# Patient Record
Sex: FEMALE | Race: BLACK OR AFRICAN AMERICAN | NOT HISPANIC OR LATINO | URBAN - METROPOLITAN AREA
[De-identification: names, ages, dates, MRNs, and addresses within clinical notes are randomized per-mention and may not be internally consistent; named-entity substitution may affect disease eponyms.]

---

## 2019-06-18 ENCOUNTER — EMERGENCY (EMERGENCY)
Facility: HOSPITAL | Age: 34
LOS: 1 days | Discharge: HOME | End: 2019-06-18
Attending: EMERGENCY MEDICINE | Admitting: EMERGENCY MEDICINE
Payer: MEDICARE

## 2019-06-18 VITALS
HEIGHT: 68 IN | HEART RATE: 84 BPM | WEIGHT: 171.74 LBS | DIASTOLIC BLOOD PRESSURE: 79 MMHG | SYSTOLIC BLOOD PRESSURE: 118 MMHG | OXYGEN SATURATION: 97 % | TEMPERATURE: 98 F | RESPIRATION RATE: 18 BRPM

## 2019-06-18 VITALS
HEART RATE: 72 BPM | DIASTOLIC BLOOD PRESSURE: 80 MMHG | SYSTOLIC BLOOD PRESSURE: 112 MMHG | RESPIRATION RATE: 16 BRPM | OXYGEN SATURATION: 97 % | TEMPERATURE: 98 F

## 2019-06-18 PROCEDURE — 99284 EMERGENCY DEPT VISIT MOD MDM: CPT

## 2019-06-18 PROCEDURE — 99284 EMERGENCY DEPT VISIT MOD MDM: CPT | Mod: 25

## 2019-06-18 PROCEDURE — 96375 TX/PRO/DX INJ NEW DRUG ADDON: CPT

## 2019-06-18 PROCEDURE — 96374 THER/PROPH/DIAG INJ IV PUSH: CPT

## 2019-06-18 RX ORDER — SODIUM CHLORIDE 9 MG/ML
1000 INJECTION INTRAMUSCULAR; INTRAVENOUS; SUBCUTANEOUS ONCE
Refills: 0 | Status: COMPLETED | OUTPATIENT
Start: 2019-06-18 | End: 2019-06-18

## 2019-06-18 RX ORDER — ACETAMINOPHEN 500 MG
1000 TABLET ORAL ONCE
Refills: 0 | Status: COMPLETED | OUTPATIENT
Start: 2019-06-18 | End: 2019-06-18

## 2019-06-18 RX ORDER — METOCLOPRAMIDE HCL 10 MG
10 TABLET ORAL ONCE
Refills: 0 | Status: COMPLETED | OUTPATIENT
Start: 2019-06-18 | End: 2019-06-18

## 2019-06-18 RX ADMIN — Medication 10 MILLIGRAM(S): at 04:40

## 2019-06-18 RX ADMIN — SODIUM CHLORIDE 1000 MILLILITER(S): 9 INJECTION INTRAMUSCULAR; INTRAVENOUS; SUBCUTANEOUS at 04:40

## 2019-06-18 RX ADMIN — Medication 400 MILLIGRAM(S): at 05:23

## 2019-06-18 NOTE — ED PROVIDER NOTE - NSFOLLOWUPINSTRUCTIONS_ED_ALL_ED_FT
Migraine Headache  Image   A migraine headache is an intense, throbbing pain on one side or both sides of the head. Migraines may also cause other symptoms, such as nausea, vomiting, and sensitivity to light and noise.    What are the causes?  Doing or taking certain things may also trigger migraines, such as:  Alcohol.  Smoking.  Medicines, such as:  Medicine used to treat chest pain (nitroglycerine).  Birth control pills.  Estrogen pills.  Certain blood pressure medicines.  Aged cheeses, chocolate, or caffeine.  Foods or drinks that contain nitrates, glutamate, aspartame, or tyramine.  Physical activity.  Other things that may trigger a migraine include:  Menstruation.  Pregnancy.  Hunger.  Stress, lack of sleep, too much sleep, or fatigue.  Weather changes.  What increases the risk?  The following factors may make you more likely to experience migraine headaches:  Age. Risk increases with age.  Family history of migraine headaches.  Being .  Depression and anxiety.  Obesity.  Being a woman.  Having a hole in the heart (patent foramen ovale) or other heart problems.  What are the signs or symptoms?  The main symptom of this condition is pulsating or throbbing pain. Pain may:  Happen in any area of the head, such as on one side or both sides.  Interfere with daily activities.  Get worse with physical activity.  Get worse with exposure to bright lights or loud noises.  Other symptoms may include:  Nausea.  Vomiting.  Dizziness.  General sensitivity to bright lights, loud noises, or smells.  Before you get a migraine, you may get warning signs that a migraine is developing (aura). An aura may include:  Seeing flashing lights or having blind spots.  Seeing bright spots, halos, or zigzag lines.  Having tunnel vision or blurred vision.  Having numbness or a tingling feeling.  Having trouble talking.  Having muscle weakness.  How is this diagnosed?  A migraine headache can be diagnosed based on:  Your symptoms.  A physical exam.  Tests, such as CT scan or MRI of the head. These imaging tests can help rule out other causes of headaches.  Taking fluid from the spine (lumbar puncture) and analyzing it (cerebrospinal fluid analysis, or CSF analysis).  How is this treated?  A migraine headache is usually treated with medicines that:  Relieve pain.  Relieve nausea.  Prevent migraines from coming back.  Treatment may also include:  Acupuncture.  Lifestyle changes like avoiding foods that trigger migraines.  Follow these instructions at home:  Medicines     Take over-the-counter and prescription medicines only as told by your health care provider.  Do not drive or use heavy machinery while taking prescription pain medicine.  To prevent or treat constipation while you are taking prescription pain medicine, your health care provider may recommend that you:  Drink enough fluid to keep your urine clear or pale yellow.  Take over-the-counter or prescription medicines.  Eat foods that are high in fiber, such as fresh fruits and vegetables, whole grains, and beans.  Limit foods that are high in fat and processed sugars, such as fried and sweet foods.  Lifestyle     Avoid alcohol use.  Do not use any products that contain nicotine or tobacco, such as cigarettes and e-cigarettes. If you need help quitting, ask your health care provider.  Get at least 8 hours of sleep every night.  Limit your stress.  General instructions     Image   Keep a journal to find out what may trigger your migraine headaches. For example, write down:  What you eat and drink.  How much sleep you get.  Any change to your diet or medicines.  If you have a migraine:  Avoid things that make your symptoms worse, such as bright lights.  It may help to lie down in a dark, quiet room.  Do not drive or use heavy machinery.  Ask your health care provider what activities are safe for you while you are experiencing symptoms.  Keep all follow-up visits as told by your health care provider. This is important.  Contact a health care provider if:  You develop symptoms that are different or more severe than your usual migraine symptoms.  Get help right away if:  Your migraine becomes severe.  You have a fever.  You have a stiff neck.  You have vision loss.  Your muscles feel weak or like you cannot control them.  You start to lose your balance often.  You develop trouble walking.  You faint.  This information is not intended to replace advice given to you by your health care provider. Make sure you discuss any questions you have with your health care provider.

## 2019-06-18 NOTE — ED ADULT NURSE NOTE - OBJECTIVE STATEMENT
34 y/o pt AOx3 arrived to the ED c/o having a headache, pt states she was [facility] where she was treated and discharged and came here because she still had a headache.

## 2019-06-18 NOTE — ED PROVIDER NOTE - ATTENDING CONTRIBUTION TO CARE
Attending Statement: I have personally performed a face to face diagnostic evaluation on this patient. I have reviewed the ACP note and agree with the history, exam and plan of care, except as noted.     Attending Contribution to Care:  33F with hx of chronic HA who p/w headache, typical of her previous HAs. do not suspect SAH, or mass, no imaging indicated at this time. Pain treated and improved. Labs including cbc/cmp, ua upreg negative with transvaginal us with all negative acute findings. Do not suspect UTI. Continue gyn recs for dysfunctional uterine bleeding. Stable for DC with outpt follow up. Advised follow-up at [facility]'s primary clinic.

## 2019-06-18 NOTE — ED PROVIDER NOTE - CLINICAL SUMMARY MEDICAL DECISION MAKING FREE TEXT BOX
32 y/o female well-appearing in NAD. Neuro intact. chronic HA without new symptoms, do not suspect SAH, therefore CT not required. Pain treated and improved. No fever, do not suspect meningitis. Pt with cbc/cmp, ua upreg negative with transvaginal us with all negative acute findings. Do not suspect UTI. Continue gyn recs for dysfunctional uterine bleeding. Pt HDS with VS nml. Advised follow-up at [facility] - pt's primary clinic.

## 2019-06-18 NOTE — ED ADULT NURSE NOTE - NSIMPLEMENTINTERV_GEN_ALL_ED
Implemented All Universal Safety Interventions:  [place] to call system. Call bell, personal items and telephone within reach. Instruct patient to call for assistance. Room bathroom lighting operational. Non-slip footwear when patient is off stretcher. Physically safe environment: no spills, clutter or unnecessary equipment. Stretcher in lowest position, wheels locked, appropriate side rails in place.

## 2019-06-22 DIAGNOSIS — R51 HEADACHE: ICD-10-CM

## 2019-06-22 DIAGNOSIS — N93.9 ABNORMAL UTERINE AND VAGINAL BLEEDING, UNSPECIFIED: ICD-10-CM

## 2019-06-22 DIAGNOSIS — R11.0 NAUSEA: ICD-10-CM

## 2019-10-24 ENCOUNTER — OUTPATIENT (OUTPATIENT)
Dept: OUTPATIENT SERVICES | Facility: HOSPITAL | Age: 34
LOS: 1 days | Discharge: HOME | End: 2019-10-24

## 2019-10-25 ENCOUNTER — OUTPATIENT (OUTPATIENT)
Dept: OUTPATIENT SERVICES | Facility: HOSPITAL | Age: 34
LOS: 1 days | Discharge: HOME | End: 2019-10-25

## 2019-10-25 DIAGNOSIS — E28.39 OTHER PRIMARY OVARIAN FAILURE: ICD-10-CM

## 2019-11-04 DIAGNOSIS — E27.0 OTHER ADRENOCORTICAL OVERACTIVITY: ICD-10-CM

## 2020-10-24 ENCOUNTER — EMERGENCY (EMERGENCY)
Facility: HOSPITAL | Age: 35
LOS: 1 days | Discharge: HOME | End: 2020-10-24
Attending: EMERGENCY MEDICINE | Admitting: EMERGENCY MEDICINE
Payer: MEDICARE

## 2020-10-24 VITALS
RESPIRATION RATE: 18 BRPM | HEART RATE: 96 BPM | SYSTOLIC BLOOD PRESSURE: 122 MMHG | TEMPERATURE: 98 F | OXYGEN SATURATION: 100 % | DIASTOLIC BLOOD PRESSURE: 84 MMHG

## 2020-10-24 VITALS
HEIGHT: 68 IN | DIASTOLIC BLOOD PRESSURE: 83 MMHG | SYSTOLIC BLOOD PRESSURE: 119 MMHG | HEART RATE: 111 BPM | WEIGHT: 210.1 LBS | OXYGEN SATURATION: 99 % | TEMPERATURE: 98 F | RESPIRATION RATE: 24 BRPM

## 2020-10-24 DIAGNOSIS — Z88.0 ALLERGY STATUS TO PENICILLIN: ICD-10-CM

## 2020-10-24 DIAGNOSIS — J45.901 UNSPECIFIED ASTHMA WITH (ACUTE) EXACERBATION: ICD-10-CM

## 2020-10-24 DIAGNOSIS — Z88.6 ALLERGY STATUS TO ANALGESIC AGENT: ICD-10-CM

## 2020-10-24 LAB
ALBUMIN SERPL ELPH-MCNC: 3.6 G/DL — SIGNIFICANT CHANGE UP (ref 3.4–5)
ALP SERPL-CCNC: 51 U/L — SIGNIFICANT CHANGE UP (ref 40–120)
ALT FLD-CCNC: 24 U/L — SIGNIFICANT CHANGE UP (ref 12–42)
ANION GAP SERPL CALC-SCNC: 11 MMOL/L — SIGNIFICANT CHANGE UP (ref 9–16)
AST SERPL-CCNC: 24 U/L — SIGNIFICANT CHANGE UP (ref 15–37)
BASOPHILS # BLD AUTO: 0.03 K/UL — SIGNIFICANT CHANGE UP (ref 0–0.2)
BASOPHILS NFR BLD AUTO: 0.5 % — SIGNIFICANT CHANGE UP (ref 0–2)
BILIRUB SERPL-MCNC: 0.2 MG/DL — SIGNIFICANT CHANGE UP (ref 0.2–1.2)
BUN SERPL-MCNC: 13 MG/DL — SIGNIFICANT CHANGE UP (ref 7–23)
CALCIUM SERPL-MCNC: 9.3 MG/DL — SIGNIFICANT CHANGE UP (ref 8.5–10.5)
CHLORIDE SERPL-SCNC: 106 MMOL/L — SIGNIFICANT CHANGE UP (ref 96–108)
CO2 SERPL-SCNC: 23 MMOL/L — SIGNIFICANT CHANGE UP (ref 22–31)
CREAT SERPL-MCNC: 0.83 MG/DL — SIGNIFICANT CHANGE UP (ref 0.5–1.3)
EOSINOPHIL # BLD AUTO: 0.01 K/UL — SIGNIFICANT CHANGE UP (ref 0–0.5)
EOSINOPHIL NFR BLD AUTO: 0.2 % — SIGNIFICANT CHANGE UP (ref 0–6)
GLUCOSE SERPL-MCNC: 106 MG/DL — HIGH (ref 70–99)
HCG SERPL-ACNC: <1 MIU/ML — SIGNIFICANT CHANGE UP
HCT VFR BLD CALC: 29.1 % — LOW (ref 34.5–45)
HGB BLD-MCNC: 9.3 G/DL — LOW (ref 11.5–15.5)
IMM GRANULOCYTES NFR BLD AUTO: 0.7 % — SIGNIFICANT CHANGE UP (ref 0–1.5)
LYMPHOCYTES # BLD AUTO: 1.66 K/UL — SIGNIFICANT CHANGE UP (ref 1–3.3)
LYMPHOCYTES # BLD AUTO: 29.5 % — SIGNIFICANT CHANGE UP (ref 13–44)
MCHC RBC-ENTMCNC: 30.2 PG — SIGNIFICANT CHANGE UP (ref 27–34)
MCHC RBC-ENTMCNC: 32 GM/DL — SIGNIFICANT CHANGE UP (ref 32–36)
MCV RBC AUTO: 94.5 FL — SIGNIFICANT CHANGE UP (ref 80–100)
MONOCYTES # BLD AUTO: 0.77 K/UL — SIGNIFICANT CHANGE UP (ref 0–0.9)
MONOCYTES NFR BLD AUTO: 13.7 % — SIGNIFICANT CHANGE UP (ref 2–14)
NEUTROPHILS # BLD AUTO: 3.11 K/UL — SIGNIFICANT CHANGE UP (ref 1.8–7.4)
NEUTROPHILS NFR BLD AUTO: 55.4 % — SIGNIFICANT CHANGE UP (ref 43–77)
NRBC # BLD: 0 /100 WBCS — SIGNIFICANT CHANGE UP (ref 0–0)
PCO2 BLDV: 40 MMHG — LOW (ref 41–51)
PH BLDV: 7.4 — SIGNIFICANT CHANGE UP (ref 7.32–7.43)
PLATELET # BLD AUTO: 121 K/UL — LOW (ref 150–400)
PO2 BLDV: 35 MMHG — SIGNIFICANT CHANGE UP (ref 35–40)
POTASSIUM SERPL-MCNC: 3.9 MMOL/L — SIGNIFICANT CHANGE UP (ref 3.5–5.3)
POTASSIUM SERPL-SCNC: 3.9 MMOL/L — SIGNIFICANT CHANGE UP (ref 3.5–5.3)
PROT SERPL-MCNC: 8.2 G/DL — SIGNIFICANT CHANGE UP (ref 6.4–8.2)
RBC # BLD: 3.08 M/UL — LOW (ref 3.8–5.2)
RBC # FLD: 16.4 % — HIGH (ref 10.3–14.5)
SAO2 % BLDV: 63 % — SIGNIFICANT CHANGE UP
SODIUM SERPL-SCNC: 140 MMOL/L — SIGNIFICANT CHANGE UP (ref 132–145)
WBC # BLD: 5.62 K/UL — SIGNIFICANT CHANGE UP (ref 3.8–10.5)
WBC # FLD AUTO: 5.62 K/UL — SIGNIFICANT CHANGE UP (ref 3.8–10.5)

## 2020-10-24 PROCEDURE — 99291 CRITICAL CARE FIRST HOUR: CPT

## 2020-10-24 PROCEDURE — 93010 ELECTROCARDIOGRAM REPORT: CPT

## 2020-10-24 RX ORDER — ALBUTEROL 90 UG/1
1 AEROSOL, METERED ORAL ONCE
Refills: 0 | Status: COMPLETED | OUTPATIENT
Start: 2020-10-24 | End: 2020-10-24

## 2020-10-24 RX ORDER — FAMOTIDINE 10 MG/ML
20 INJECTION INTRAVENOUS ONCE
Refills: 0 | Status: COMPLETED | OUTPATIENT
Start: 2020-10-24 | End: 2020-10-24

## 2020-10-24 RX ORDER — ALBUTEROL 90 UG/1
2.5 AEROSOL, METERED ORAL
Refills: 0 | Status: COMPLETED | OUTPATIENT
Start: 2020-10-24 | End: 2020-10-24

## 2020-10-24 RX ORDER — DIPHENHYDRAMINE HCL 50 MG
50 CAPSULE ORAL ONCE
Refills: 0 | Status: COMPLETED | OUTPATIENT
Start: 2020-10-24 | End: 2020-10-24

## 2020-10-24 RX ORDER — SODIUM CHLORIDE 9 MG/ML
1000 INJECTION INTRAMUSCULAR; INTRAVENOUS; SUBCUTANEOUS ONCE
Refills: 0 | Status: COMPLETED | OUTPATIENT
Start: 2020-10-24 | End: 2020-10-24

## 2020-10-24 RX ORDER — EPINEPHRINE 11.25MG/ML
0.5 SOLUTION, NON-ORAL INHALATION ONCE
Refills: 0 | Status: COMPLETED | OUTPATIENT
Start: 2020-10-24 | End: 2020-10-24

## 2020-10-24 RX ORDER — LEVETIRACETAM 250 MG/1
1 TABLET, FILM COATED ORAL
Qty: 7 | Refills: 0
Start: 2020-10-24 | End: 2020-10-30

## 2020-10-24 RX ORDER — EPINEPHRINE 0.3 MG/.3ML
0.3 INJECTION INTRAMUSCULAR; SUBCUTANEOUS ONCE
Refills: 0 | Status: COMPLETED | OUTPATIENT
Start: 2020-10-24 | End: 2020-10-24

## 2020-10-24 RX ORDER — LEVETIRACETAM 250 MG/1
500 TABLET, FILM COATED ORAL ONCE
Refills: 0 | Status: COMPLETED | OUTPATIENT
Start: 2020-10-24 | End: 2020-10-24

## 2020-10-24 RX ORDER — ACETAMINOPHEN 500 MG
1 TABLET ORAL
Qty: 28 | Refills: 0
Start: 2020-10-24 | End: 2020-10-30

## 2020-10-24 RX ADMIN — FAMOTIDINE 20 MILLIGRAM(S): 10 INJECTION INTRAVENOUS at 13:24

## 2020-10-24 RX ADMIN — Medication 125 MILLIGRAM(S): at 13:24

## 2020-10-24 RX ADMIN — Medication 50 MILLIGRAM(S): at 13:23

## 2020-10-24 RX ADMIN — ALBUTEROL 2.5 MILLIGRAM(S): 90 AEROSOL, METERED ORAL at 14:21

## 2020-10-24 RX ADMIN — ALBUTEROL 1 PUFF(S): 90 AEROSOL, METERED ORAL at 15:29

## 2020-10-24 RX ADMIN — SODIUM CHLORIDE 1000 MILLILITER(S): 9 INJECTION INTRAMUSCULAR; INTRAVENOUS; SUBCUTANEOUS at 15:29

## 2020-10-24 RX ADMIN — Medication 0.5 MILLILITER(S): at 13:23

## 2020-10-24 RX ADMIN — LEVETIRACETAM 500 MILLIGRAM(S): 250 TABLET, FILM COATED ORAL at 15:28

## 2020-10-24 RX ADMIN — SODIUM CHLORIDE 1000 MILLILITER(S): 9 INJECTION INTRAMUSCULAR; INTRAVENOUS; SUBCUTANEOUS at 13:24

## 2020-10-24 RX ADMIN — EPINEPHRINE 0.3 MILLIGRAM(S): 0.3 INJECTION INTRAMUSCULAR; SUBCUTANEOUS at 13:05

## 2020-10-24 RX ADMIN — ALBUTEROL 2.5 MILLIGRAM(S): 90 AEROSOL, METERED ORAL at 13:23

## 2020-10-24 RX ADMIN — ALBUTEROL 2.5 MILLIGRAM(S): 90 AEROSOL, METERED ORAL at 14:05

## 2020-10-24 NOTE — ED PROVIDER NOTE - PATIENT PORTAL LINK FT
You can access the FollowMyHealth Patient Portal offered by [facility] by registering at the following website: [email or web address]. By joining [facility]’s FollowMyHealth portal, you will also be able to view your health information using other applications (apps) compatible with our system.

## 2020-10-24 NOTE — ED PROVIDER NOTE - OBJECTIVE STATEMENT
36 y/o F with PMHx of seizures (on Depakote) and asthma presents to the ED for SOB and wheezing in the setting of an asthma exacerbation. Pt [place] the train when she began to have some SOB. Pt tried using her rescue inhaler which had some dust on it. She subsequently felt worse. Pt walked into the ED for evaluation. Hx is limited as Pt is visibly short of breath on arrival. Pt denies fevers, chills, CP, N/V and Abd pain.

## 2020-10-24 NOTE — ED ADULT NURSE NOTE - OBJECTIVE STATEMENT
Pt w/ PMH of asthma, seizure disorder and schizoaffective bipolar disorder w/ recent admission and DC from inpatient psych presents to ED today c/o dyspnea.  Pt is able to speak in complete sentences, but states "it feels like my throat is closing up."  Pt elicits she uses a rescue inhaler and states it might have had dust on it when she used it.  Pt on CCM, w/ duoneb in progress.  Will continue to monitor.

## 2020-10-24 NOTE — ED PROVIDER NOTE - CLINICAL SUMMARY MEDICAL DECISION MAKING FREE TEXT BOX
34 y/o F presenting with SOB and wheezing in the setting of an asthma exacerbation. Exam is remarkable for mild wheezing with some inspiratory stridor. Pt is speaking in short phrases but is able to provide some history. She endorses her current symptoms are consistent with prior episodes of asthma exacerbation. Pt was initially treated with Duoneb without significant improvement. Provided dose of IM Epi. Suspect symptoms are possibly from allergy / inflammatory reaction from the dust on the inhaler. Will order basic labs and EKG. Provided Steroids and Antihistamines. Will continue to closely monitor.

## 2020-10-24 NOTE — ED PROVIDER NOTE - CONSTITUTIONAL, MLM
normal... Speaking in short phrases but able to provide some Hx. Awake, alert, oriented to person, place, time/situation.

## 2020-10-24 NOTE — ED PROVIDER NOTE - CRITICAL CARE INDICATION, MLM
patient was critically ill... Patient was critically ill with a high probability of imminent or life threatening deterioration; Pt was initially treated with Duoneb without significant improvement. Provided dose of IM Epi. Suspect symptoms are possibly from allergy / inflammatory reaction from the dust on the inhaler

## 2020-10-24 NOTE — ED ADULT TRIAGE NOTE - CHIEF COMPLAINT QUOTE
c/o asthma exacerbation while on the train. as per pt, reports her rescue inhaler had dust and made it worse.

## 2020-10-25 ENCOUNTER — EMERGENCY (EMERGENCY)
Facility: HOSPITAL | Age: 35
LOS: 1 days | Discharge: HOME | End: 2020-10-25
Attending: EMERGENCY MEDICINE | Admitting: EMERGENCY MEDICINE
Payer: MEDICARE

## 2020-10-25 VITALS
HEART RATE: 91 BPM | SYSTOLIC BLOOD PRESSURE: 118 MMHG | OXYGEN SATURATION: 98 % | DIASTOLIC BLOOD PRESSURE: 84 MMHG | RESPIRATION RATE: 16 BRPM | WEIGHT: 210.1 LBS | TEMPERATURE: 99 F | HEIGHT: 68 IN

## 2020-10-25 DIAGNOSIS — J45.909 UNSPECIFIED ASTHMA, UNCOMPLICATED: ICD-10-CM

## 2020-10-25 DIAGNOSIS — Z79.899 OTHER LONG TERM (CURRENT) DRUG THERAPY: ICD-10-CM

## 2020-10-25 DIAGNOSIS — R43.8 OTHER DISTURBANCES OF SMELL AND TASTE: ICD-10-CM

## 2020-10-25 DIAGNOSIS — Z79.1 LONG TERM (CURRENT) USE OF NON-STEROIDAL ANTI-INFLAMMATORIES (NSAID): ICD-10-CM

## 2020-10-25 DIAGNOSIS — Z20.828 CONTACT WITH AND (SUSPECTED) EXPOSURE TO OTHER VIRAL COMMUNICABLE DISEASES: ICD-10-CM

## 2020-10-25 PROCEDURE — 99283 EMERGENCY DEPT VISIT LOW MDM: CPT

## 2020-10-25 RX ORDER — FAMOTIDINE 10 MG/ML
20 INJECTION INTRAVENOUS ONCE
Refills: 0 | Status: DISCONTINUED | OUTPATIENT
Start: 2020-10-25 | End: 2020-10-25

## 2020-10-25 NOTE — ED PROVIDER NOTE - RESPIRATORY, MLM
Breath sounds clear and equal bilaterally. No conversational dyspnea. Speaking in full sentences. Breath sounds deep, clear and equal bilaterally. No conversational dyspnea. Speaking in full sentences on the phone while standing and without interruptions, no wheezing on exam.

## 2020-10-25 NOTE — ED PROVIDER NOTE - NSFOLLOWUPINSTRUCTIONS_ED_ALL_ED_FT
COVID-19 (Coronavirus Disease 2019)    WHAT YOU NEED TO KNOW:    COVID-19 is the disease caused by the novel (new) coronavirus first discovered in December 2019. Coronaviruses generally cause upper respiratory (nose, throat, and lung) infections, such as a cold. The new virus can also cause serious lower respiratory conditions, such as pneumonia or acute respiratory distress syndrome (ARDS). Anyone can develop serious problems from the new virus, but your risk is higher if you are 65 or older. A weak immune system, diabetes, or a heart or lung condition can also increase your risk.    DISCHARGE INSTRUCTIONS:    If you think you or someone you know may be infected: Do the following to protect others:   •If emergency care is needed, tell the  about the possible infection, or call ahead and tell the emergency department.      •Call a healthcare provider for instructions if symptoms are mild. Anyone who may be infected should not arrive without calling first. The provider will need to protect staff members and other patients.      •The person who may be infected needs to wear a face covering while getting medical care. This will help lower the risk of infecting others. Coverings are not used for anyone who is younger than 2 years, has breathing problems, or cannot remove it. The provider can give you instructions for anyone who cannot wear a covering.      Call your local emergency number (911 in the [place]) or go to the emergency department if:   •You have trouble breathing or shortness of breath at rest.      •You have chest pain or pressure that lasts longer than 5 minutes.      •You become confused or hard to wake.      •Your lips or face are blue.      •You have a fever of 104°F (40°C) or higher.      Call your doctor if:   •You do not have symptoms of COVID-19 but had close physical contact within 14 days with someone who tested positive.      •You have questions or concerns about your condition or care.      Medicines: You may need any of the following:   •Decongestants help reduce nasal congestion and help you breathe more easily. If you take decongestant pills, they may make you feel restless or cause problems with your sleep. Do not use decongestant sprays for more than a few days.      •Cough suppressants help reduce coughing. Ask your healthcare provider which type of cough medicine is best for you.      •Acetaminophen decreases pain and fever. It is available without a doctor's order. Ask how much to take and how often to take it. Follow directions. Read the labels of all other medicines you are using to see if they also contain acetaminophen, or ask your doctor or pharmacist. Acetaminophen can cause liver damage if not taken correctly. Do not use more than 4 grams (4,000 milligrams) total of acetaminophen in one day.       •NSAIDs, such as ibuprofen, help decrease swelling, pain, and fever. NSAIDs can cause stomach bleeding or kidney problems in certain people. If you take blood thinner medicine, always ask your healthcare provider if NSAIDs are safe for you. Always read the medicine label and follow directions.      •Take your medicine as directed. Contact your healthcare provider if you think your medicine is not helping or if you have side effects. Tell him or her if you are allergic to any medicine. Keep a list of the medicines, vitamins, and herbs you take. Include the amounts, and when and why you take them. Bring the list or the pill bottles to follow-up visits. Carry your medicine list with you in case of an emergency.      How the 2019 coronavirus spreads: The virus spreads quickly and easily. You can become infected if you are in contact with a large amount of the virus, even for a short time. You can also become infected by being around a small amount of virus for a long time. The following are ways the virus is thought to spread, but more information may be coming:   •Droplets are the most common way all coronaviruses spread. The virus can travel in droplets that form when a person talks, coughs, or sneezes. Anyone who breathes in the droplets or gets them in his or her eyes can become infected with the virus. Close personal contact with an infected person is thought to be the main way the virus spreads. Close personal contact means you are within 6 feet (2 meters) of the person.      •Person-to-person contact can spread the virus. For example, a person with the virus on his or her hands can spread it by shaking hands with someone. At this time, it does not appear that the virus can be passed to a baby during pregnancy or delivery. The baby can be infected after he or she is born through person-to-person contact. The virus also does not appear to spread in breast milk. If you are pregnant or breastfeeding, talk to your healthcare provider or obstetrician about any concerns you have.      •The virus can stay on objects and surfaces. A person can get the virus on his or her hands by touching the object or surface. Infection happens if the person then touches his or her eyes or mouth with unwashed hands. It is not yet known how long the virus can stay on an object or surface. That is why it is important to clean all surfaces that are used regularly.      •An infected animal may be able to infect a person who touches it. This may happen at live markets or on a farm.      How everyone can lower the risk for COVID-19: The best way to prevent infection is to avoid anyone who is infected, but this can be hard to do. An infected person can spread the virus before signs or symptoms begin, or even if signs or symptoms never develop. The following can help lower the risk for infection:     Limit the Spread of Infectious Disease     •Wash your hands often throughout the day. Use soap and water. Rub your soapy hands together, lacing your fingers. Wash the front and back of each hand, and in between your fingers. Use the fingers of one hand to scrub under the fingernails of the other hand. Wash for at least 20 seconds. Rinse with warm, running water for several seconds. Then dry your hands with a clean towel or paper towel. Use hand  that contains alcohol if soap and water are not available. Do not touch your eyes, nose, or mouth without washing your hands first. Teach children how to wash their hands and use hand .  Handwashing           •Cover a sneeze or cough. This prevents droplets from traveling from you to others. Turn your face away and cover your mouth and nose with a tissue. Throw the tissue away. Use the bend of your arm if a tissue is not available. Then wash your hands well with soap and water or use hand . Turn and cover your face if you are around someone who is sneezing or coughing. Teach children how to cover a cough or sneeze.      •Follow worldwide, national, and local social distancing guidelines. Social distancing means people avoid close physical contact so the virus cannot spread from one person to another. Keep at least 6 feet (2 meters) between you and others. Also keep this distance from anyone who comes to your home, such as someone making a delivery.      •Make a habit of not touching your face. It is not known how long the virus can stay on objects and surfaces. If you get the virus on your hands, you can transfer it to your eyes, nose, or mouth and become infected. You can also transfer it to objects, surfaces, or people. Be aware of what you touch when you go out. Examples include handrails and elevator buttons. Try not to touch anything with bare hands unless it is necessary. Wash your hands before you leave your home and when you return.      •Clean and disinfect high-touch surfaces and objects often. Use a disinfecting solution or wipes. You can make a solution by diluting 4 teaspoons of bleach in 1 quart (4 cups) of water. Clean and disinfect even if you think no one living in or coming to your home is infected with the virus. You can wipe items with a disinfecting cloth before you bring them into your home. Wash your hands after you handle anything you bring into your home.      •Make your immune system as healthy as possible. A weakened immune system makes you more vulnerable to the new coronavirus. No COVID-19 vaccine is available yet. Vaccines such as the flu and pneumonia vaccines can help your immune system. Your healthcare provider can tell you which vaccines to get, and when to get them. Keep your immune system as strong as possible. Do not smoke. Eat healthy foods, exercise regularly, and try to manage stress. Go to bed and wake up at the same times each day.   Healthy Foods           Social distancing guidelines: National and local social distancing rules vary. Rules may change over time as restrictions are lifted. Restrictions may return if an outbreak happens where you live. It is important to know and follow all current social distancing rules in your area. The following are general guidelines:  •Limit trips out of your home. You may be able to have food, medicines, and other supplies delivered. If possible, have delivered items left at your door or other area. Try not to have someone hand you an item. You will be so close to the person that the virus can spread between you.      •Do not have close physical contact with anyone who does not live in your home. Do not shake hands with, hug, or kiss a person as a greeting. Stand or walk as far from others as possible. If you must use public transportation (such as a bus or subway), try to sit or stand away from others. You can stay safely connected with others through phone calls, e-mail messages, social media websites, and video chats. Check in on anyone who may be having a hard time socially distancing, or who lives alone. Ask administrators at nursing homes or long-term care facilities how you can safely communicate with someone living there.      •Wear a cloth face covering around others who do not live in your home. Face coverings help prevent the virus from spreading to others in droplets. You can use a clear face covering if someone needs to read your lips. This is a cloth covering that has plastic over the mouth area so your lips can be seen. Do not use coverings that have breathing valves or vents. The virus can travel out of the valve or vent and be spread to others. Do not take your covering off to talk, cough, or sneeze. Do not use coverings on children younger than 2 years or on anyone who has breathing problems or cannot remove it.      •Only allow medical or other necessary professionals into your home. Wear your face covering, and remind professionals to wear a face covering. Remind them to wash their hands when they arrive and before they leave. Do not let anyone who does not live in your home in, even if the person is not sick. A person can pass the virus to others before symptoms of COVID-19 begin. Some people never even develop symptoms. Children commonly have mild symptoms or no symptoms. It may be hard to tell a child not to hug or kiss you. Explain that this is how he or she can help you stay healthy.      •Do not go to someone else's home unless it is necessary. Do not go over to visit, even if the person is lonely. Only go if you need to help him or her. Make sure you both wear face coverings while you are there.      •Avoid large gatherings and crowds. Gatherings or crowds of 10 or more individuals can cause the virus to spread. Examples of gatherings include parties, sporting events, [employer] services, and conferences. Crowds may form at beaches, pantoja, and tourist attractions. Protect yourself by staying away from large gatherings and crowds.      •Ask your healthcare provider for other ways to have appointments. You may be able to have appointments without having to go into the provider's office. Some providers offer phone, video, or other types of appointments. You may also be able to get prescriptions for a few months of your medicines at a time.      •Stay safe if you must go out to work. You may have a job that can only be done outside your home. Keep physical distance between you and other workers as much as possible. Follow your employer's rules so everyone stays safe.      If you have COVID-19 and are recovering at home: Healthcare providers will give you specific instructions to follow. The following are general guidelines to remind you how to keep others safe until you are well:   •Wash your hands often. Use soap and water as much as possible. You can use hand  that contains alcohol if soap and water are not available. Do not share towels with anyone. If you use paper towels, throw them away in a lined trash can kept in your room or area. Use a covered trash can, if possible.      •Do not go out of your home unless it is necessary. You may have to go to your healthcare provider's office for check-ups or to get prescription refills. Do not arrive at the provider's office without an appointment. Providers have to make their offices safe for staff and other patients.      •Do not have close physical contact with anyone unless it is necessary. Only have close physical contact with a person giving direct care, or a baby or child you must care for. Family members and friends should not visit you. If possible, stay in a separate area or room of your home if you live with others. No one should go into the area or room except to give you care. You can visit with others by phone, video chat, e-mail, or similar systems. It is important to stay connected with others in your life while you recover.      •Wear a face covering while others are near you. This can help prevent droplets from spreading the virus when you talk, sneeze, or cough. Put the covering on before anyone comes into your room or area. Remind the person to cover his or her nose and mouth before going in to provide care for you.      •Do not share items. Do not share dishes, towels, or other items with anyone. Items need to be washed after you use them.      •Protect your baby. Wash your hands with soap and water often throughout the day. Wear a clean face covering while you breastfeed, or while you express or pump breast milk. If possible, ask someone who is well to care for your baby. You can put breast milk in bottles for the person to use, if needed. Talk to your healthcare provider if you have any questions or concerns about caring for or bonding with your baby. He or she will tell you when to bring your baby in for check-ups and vaccines. He or she will also tell you what to do if you think your baby was infected with the new virus.      •Do not handle live animals. Until more is known, it is best not to touch, play with, or handle live animals. Some animals, including pets, have been infected with the new coronavirus. Do not handle or care for animals until you are well. Care includes feeding, petting, and cuddling your pet. Do not let your pet lick you or share your food. Ask someone who is not infected to take care of your pet, if possible. If you must care for a pet, wear a face covering. Wash your hands before and after you give care.      •Follow directions from your healthcare provider for being around others after you recover. You will need to wait at least 10 days after symptoms first appeared. Then you will need to have no fever for 24 hours without fever medicine, and no other symptoms. A loss of taste or smell may continue for several months. It is considered okay to be around others if this is your only symptom. It is not known for sure if or for how long a recovered person can pass the virus to others. Your provider may give you instructions, such as continuing social distancing or wearing a face covering around others.      How to take care of someone who has COVID-19: If the person lives in another home, arrange for a time to give care. Remember to bring a few pairs of disposable gloves and a cloth face covering. The following are general guidelines to help you safely care for anyone who has COVID-19:  •Wash your hands often. Wash before and after you go into the person's home, area, or room. Throw paper towels away in a lined trash can that has a lid, if possible.      •Do not allow others to go near the person. No one should come into the person's home unless it is necessary. If possible, the person should be in a separate area or room if he or she lives with others. Keep the room's door shut unless you need to go in or out. Have others call, video chat, or e-mail the person if he or she is feeling well enough. The person may feel lonely if he or she is kept separate for a long period of time. Safe communication can help him or her stay connected to family and friends.      •Make sure the person's room has good air flow. You may be able to open the window if the weather allows. An air conditioner can also be turned on to help air move.      •Contact the person before you go in to give care. Make sure the person is wearing a face covering. Remind him or her to wash his or her hands with soap and water. He or she can use hand  that contains alcohol if soap and water are not available. Put on a face covering before you go in to give care.      •Wear gloves while you give care and clean. Clean items the person uses often. Clean countertops, cooking surfaces, and the fronts and insides of the microwave and refrigerator. Clean the shower, toilet, the area around the toilet, the sink, the area around the sink, and faucets. Gather used laundry or bedding. Wash and dry items on the warmest settings the fabric allows. Wash dishes and silverware in hot, soapy water or in a .      •Anything you throw away needs to go into a lined trash can. When you need to empty the trash, close the open end of the lining and tie it closed. This helps prevent items the virus is on from spilling out of the trash. Remove your gloves and throw them away. Wash your hands.      Follow up with your doctor as directed: Write down your questions so you remember to ask them during your visits.    For more information:   •[employer] for Disease Control and Prevention  [address] [place] [address]  Phone: 1-797.847.3486  Web Address: http://www.cdc.gov           © Copyright [employer] 2020           back to top                          © Copyright [employer] 2020

## 2020-10-25 NOTE — ED PROVIDER NOTE - PROGRESS NOTE DETAILS
No conversational dyspnea speaking on the phone with 311 for 10-15 minutes while standing the entire time; Objectively asymptomatic. After speaking on the phone, requesting D/C papers. Observed to be speaking on the phone with 311 for 10-15 minutes while standing the entire time, no conversational dyspnea, no SOB, full and clear sentences, objectively asymptomatic. After speaking on the phone with 311, patient ambulated out of her room to provider requesting her D/C papers.

## 2020-10-25 NOTE — ED PROVIDER NOTE - CLINICAL SUMMARY MEDICAL DECISION MAKING FREE TEXT BOX
34 y/o F presenting with c/o loss of taste x1 day. On exam, Pt appears well and in no apparent distress. No vital sign derangements. No conversational dyspnea. Explained to Pt it may be COVID-19 and advised for self-quarantine given that she is not hypoxic. Will swab for COVID-19 and D/C afterwards. Pt expressed concern over her living situation at the shelter given her symptoms. Pt was told to follow up tomorrow with social work. 34 y/o F with asthma and seizures presenting with c/o loss of smell and taste x1 day in the context of flu-like symptoms and medical clearance from the ER yesterday for asthma. On exam, Pt appears well and in no apparent distress. Full and clear breath sounds on auscultation and no vital sign derangements. No conversational dyspnea while speaking on the phone with 311 and with provider. Explained to Pt her symptoms may be secondary to COVID-19 and advised for self-quarantine given that she is not hypoxic. Educated on silent hypoxia and how to read a pulse oximeter; patient able to verbalize understanding of return precautions such as SOB, dyspnea on exertion, fever, difficulty speaking, pulse oximeter reading below 92%, and that she can follow up with her PCP for further care. Pulse oximeter also demonstrated with the patient on her finger: 98% on RA. Patient also swabbed for COVID-19.     Upon further interviewing, patient then expressed concern over her living situation with her roommate given her symptoms but had those concerns allayed after a 10-15 minute conversation with 311 on the phone in her room.  I feel that the patient has decision making capacity as the patient demonstrates ability to understand the current clinical situation and is able to communicate a choice for what they want to do. There is no evidence of intoxication of altered mentation which would preclude normal cognitive function.     The patient continually is able to express their understanding of the benefits, risks, and alternatives and continues to be able to make logical and rational choices.    Shared decision making made at bedside and after being informed of their clinical exam and results, patient elects to be discharged home with outpatient follow up, declining any further ED intervention or workup, and is able to verbalize understanding of strict return precautions to the ED.     Patient also advised that she can follow with [facility] social work on Tuesday, or at [facility] right now for 24/7 social work. Patient able to verbalize the address of [facility] of [address] and ambulated out of the ED in NAD with no SOB. 36 y/o F with asthma and seizures presenting with c/o loss of smell and taste x1 day in the context of flu-like symptoms and medical clearance from the ER yesterday for asthma. On exam, Pt appears well and in no apparent distress. Full and clear breath sounds on auscultation and no vital sign derangements. No conversational dyspnea while speaking on the phone with 311 and with provider. Explained to Pt her symptoms may be secondary to COVID-19 and advised for self-quarantine given that she is not hypoxic. Educated on silent hypoxia and how to read a pulse oximeter; patient able to verbalize understanding of return precautions such as SOB, dyspnea on exertion, fever, difficulty speaking, pulse oximeter reading below 92%, and that she can follow up with her PCP for further care. Pulse oximeter also demonstrated with the patient on her finger: 98% on RA. Patient also swabbed for COVID-19.     Upon further interviewing, patient then expressed that her primary concern for her ED visit was her living situation with her roommate given her symptoms but had those concerns allayed after she was recommended to speak with 311 (given no social work here over the weekend). After a 10-15 minute conversation with 311 on the phone in her room, patient reports feeling better and asked to be discharged.  I feel that the patient has decision making capacity as the patient demonstrates ability to understand the current clinical situation and is able to communicate a choice for what they want to do. There is no evidence of intoxication of altered mentation which would preclude normal cognitive function.     The patient continually is able to express their understanding of the benefits, risks, and alternatives and continues to be able to make logical and rational choices.    Shared decision making made at bedside and after being informed of their clinical exam and results, patient elects to be discharged home with outpatient follow up, declining any further ED intervention or workup, and is able to verbalize understanding of strict return precautions to the ED.     Patient also advised that she can follow with [facility] social work on Tuesday, or at [facility] right now for 24/7 social work. Patient able to verbalize the address of [facility] of [address] and ambulated out of the ED in NAD with no SOB.

## 2020-10-25 NOTE — ED ADULT TRIAGE NOTE - ISOLATION PROVIDED EDUCATION
Problem: Falls - Risk of  Goal: *Absence of Falls  Document Rina Fall Risk and appropriate interventions in the flowsheet.    Outcome: Progressing Towards Goal  Fall Risk Interventions:  Mobility Interventions: Bed/chair exit alarm, Patient to call before getting OOB         Medication Interventions: Bed/chair exit alarm, Patient to call before getting OOB    Elimination Interventions: Bed/chair exit alarm, Call light in reach Patient

## 2020-10-25 NOTE — ED PROVIDER NOTE - OBJECTIVE STATEMENT
36 y/o F with PMHx of asthma and seizures presents to the ED for flu-like symptoms. Pt was seen at the ED yesterday in the setting of an asthma exacerbation. She returned to the ED today for re-evaluation after she began to experience a loss of smell. Pt denies fevers, chills, CP, SOB, and any known contacts with any individuals tested positive for COVID-19. Of note, she is staying in a shelter at this time. 34 y/o F with PMHx of asthma and seizures, ED visit yesterday for flu-like symptoms and treated for mild asthma exacerbation and cleared to go home with recommendations for social work follow up on Monday, returning to the ED today for continued flu-like symptoms, mild loss of smell and taste, and questions about COVID-19. Pt denies fevers, chills, CP, SOB, dyspnea on exertion and any known contacts with any individuals tested positive for COVID-19. Upon further interviewing, patient admits she denies any new medical complaints since ED discharge yesterday currently and is primarily concerned how to tell the person she's living with that she has a real illness and that she should self-quarantine for COVID-19 precautions.

## 2020-10-25 NOTE — ED PROVIDER NOTE - PATIENT PORTAL LINK FT
You can access the FollowMyHealth Patient Portal offered by [facility] by registering at the following website: [email or web address]. By joining [employer]’s FollowMyHealth portal, you will also be able to view your health information using other applications (apps) compatible with our system.

## 2020-10-25 NOTE — ED ADULT NURSE NOTE - OBJECTIVE STATEMENT
Patient to ED with complaint of body aches and chills.  Seen yesterday and dicharged.  Requesting social work

## 2020-10-26 LAB — SARS-COV-2 RNA SPEC QL NAA+PROBE: SIGNIFICANT CHANGE UP

## 2020-10-27 ENCOUNTER — TRANSCRIPTION ENCOUNTER (OUTPATIENT)
Age: 35
End: 2020-10-27

## 2020-10-29 ENCOUNTER — TRANSCRIPTION ENCOUNTER (OUTPATIENT)
Age: 35
End: 2020-10-29

## 2021-10-28 ENCOUNTER — EMERGENCY (EMERGENCY)
Facility: HOSPITAL | Age: 36
LOS: 1 days | Discharge: HOME | End: 2021-10-28
Admitting: EMERGENCY MEDICINE
Payer: MEDICARE

## 2021-10-28 VITALS
DIASTOLIC BLOOD PRESSURE: 60 MMHG | HEART RATE: 110 BPM | OXYGEN SATURATION: 100 % | TEMPERATURE: 98 F | SYSTOLIC BLOOD PRESSURE: 126 MMHG | HEIGHT: 68 IN | RESPIRATION RATE: 16 BRPM

## 2021-10-28 LAB
ALBUMIN SERPL ELPH-MCNC: 4.7 G/DL — SIGNIFICANT CHANGE UP (ref 3.3–5)
ALP SERPL-CCNC: 54 U/L — SIGNIFICANT CHANGE UP (ref 40–120)
ALT FLD-CCNC: 14 U/L — SIGNIFICANT CHANGE UP (ref 4–33)
ANION GAP SERPL CALC-SCNC: 12 MMOL/L — SIGNIFICANT CHANGE UP (ref 7–14)
AST SERPL-CCNC: 11 U/L — SIGNIFICANT CHANGE UP (ref 4–32)
BASOPHILS # BLD AUTO: 0.02 K/UL — SIGNIFICANT CHANGE UP (ref 0–0.2)
BASOPHILS NFR BLD AUTO: 0.4 % — SIGNIFICANT CHANGE UP (ref 0–2)
BILIRUB SERPL-MCNC: <0.2 MG/DL — SIGNIFICANT CHANGE UP (ref 0.2–1.2)
BUN SERPL-MCNC: 12 MG/DL — SIGNIFICANT CHANGE UP (ref 7–23)
CALCIUM SERPL-MCNC: 9.7 MG/DL — SIGNIFICANT CHANGE UP (ref 8.4–10.5)
CHLORIDE SERPL-SCNC: 107 MMOL/L — SIGNIFICANT CHANGE UP (ref 98–107)
CO2 SERPL-SCNC: 22 MMOL/L — SIGNIFICANT CHANGE UP (ref 22–31)
CREAT SERPL-MCNC: 0.96 MG/DL — SIGNIFICANT CHANGE UP (ref 0.5–1.3)
EOSINOPHIL # BLD AUTO: 0.03 K/UL — SIGNIFICANT CHANGE UP (ref 0–0.5)
EOSINOPHIL NFR BLD AUTO: 0.5 % — SIGNIFICANT CHANGE UP (ref 0–6)
GLUCOSE SERPL-MCNC: 101 MG/DL — HIGH (ref 70–99)
HCG SERPL-ACNC: <5 MIU/ML — SIGNIFICANT CHANGE UP
HCT VFR BLD CALC: 34.3 % — LOW (ref 34.5–45)
HGB BLD-MCNC: 11.5 G/DL — SIGNIFICANT CHANGE UP (ref 11.5–15.5)
IANC: 2.82 K/UL — SIGNIFICANT CHANGE UP (ref 1.5–8.5)
IMM GRANULOCYTES NFR BLD AUTO: 0.4 % — SIGNIFICANT CHANGE UP (ref 0–1.5)
LIDOCAIN IGE QN: 33 U/L — SIGNIFICANT CHANGE UP (ref 7–60)
LYMPHOCYTES # BLD AUTO: 2.03 K/UL — SIGNIFICANT CHANGE UP (ref 1–3.3)
LYMPHOCYTES # BLD AUTO: 36.9 % — SIGNIFICANT CHANGE UP (ref 13–44)
MCHC RBC-ENTMCNC: 30.5 PG — SIGNIFICANT CHANGE UP (ref 27–34)
MCHC RBC-ENTMCNC: 33.5 GM/DL — SIGNIFICANT CHANGE UP (ref 32–36)
MCV RBC AUTO: 91 FL — SIGNIFICANT CHANGE UP (ref 80–100)
MONOCYTES # BLD AUTO: 0.58 K/UL — SIGNIFICANT CHANGE UP (ref 0–0.9)
MONOCYTES NFR BLD AUTO: 10.5 % — SIGNIFICANT CHANGE UP (ref 2–14)
NEUTROPHILS # BLD AUTO: 2.82 K/UL — SIGNIFICANT CHANGE UP (ref 1.8–7.4)
NEUTROPHILS NFR BLD AUTO: 51.3 % — SIGNIFICANT CHANGE UP (ref 43–77)
NRBC # BLD: 0 /100 WBCS — SIGNIFICANT CHANGE UP
NRBC # FLD: 0 K/UL — SIGNIFICANT CHANGE UP
PLATELET # BLD AUTO: 132 K/UL — LOW (ref 150–400)
POTASSIUM SERPL-MCNC: 4.2 MMOL/L — SIGNIFICANT CHANGE UP (ref 3.5–5.3)
POTASSIUM SERPL-SCNC: 4.2 MMOL/L — SIGNIFICANT CHANGE UP (ref 3.5–5.3)
PROT SERPL-MCNC: 7.4 G/DL — SIGNIFICANT CHANGE UP (ref 6–8.3)
RBC # BLD: 3.77 M/UL — LOW (ref 3.8–5.2)
RBC # FLD: 14.5 % — SIGNIFICANT CHANGE UP (ref 10.3–14.5)
SODIUM SERPL-SCNC: 141 MMOL/L — SIGNIFICANT CHANGE UP (ref 135–145)
WBC # BLD: 5.5 K/UL — SIGNIFICANT CHANGE UP (ref 3.8–10.5)
WBC # FLD AUTO: 5.5 K/UL — SIGNIFICANT CHANGE UP (ref 3.8–10.5)

## 2021-10-28 PROCEDURE — 99284 EMERGENCY DEPT VISIT MOD MDM: CPT

## 2021-10-28 RX ORDER — LANOLIN ALCOHOL/MO/W.PET/CERES
6 CREAM (GRAM) TOPICAL ONCE
Refills: 0 | Status: COMPLETED | OUTPATIENT
Start: 2021-10-28 | End: 2021-10-28

## 2021-10-28 RX ORDER — FAMOTIDINE 10 MG/ML
20 INJECTION INTRAVENOUS DAILY
Refills: 0 | Status: DISCONTINUED | OUTPATIENT
Start: 2021-10-28 | End: 2021-11-01

## 2021-10-28 RX ORDER — SODIUM CHLORIDE 9 MG/ML
1000 INJECTION, SOLUTION INTRAVENOUS
Refills: 0 | Status: DISCONTINUED | OUTPATIENT
Start: 2021-10-28 | End: 2021-10-28

## 2021-10-28 RX ORDER — FAMOTIDINE 10 MG/ML
20 INJECTION INTRAVENOUS ONCE
Refills: 0 | Status: DISCONTINUED | OUTPATIENT
Start: 2021-10-28 | End: 2021-10-28

## 2021-10-28 RX ORDER — SUCRALFATE 1 G
1 TABLET ORAL ONCE
Refills: 0 | Status: COMPLETED | OUTPATIENT
Start: 2021-10-28 | End: 2021-10-28

## 2021-10-28 RX ORDER — DEXAMETHASONE 0.5 MG/5ML
6 ELIXIR ORAL ONCE
Refills: 0 | Status: DISCONTINUED | OUTPATIENT
Start: 2021-10-28 | End: 2021-10-28

## 2021-10-28 RX ADMIN — FAMOTIDINE 20 MILLIGRAM(S): 10 INJECTION INTRAVENOUS at 22:23

## 2021-10-28 RX ADMIN — Medication 50 MILLIGRAM(S): at 22:22

## 2021-10-28 RX ADMIN — Medication 1 GRAM(S): at 22:22

## 2021-10-28 NOTE — ED ADULT NURSE NOTE - CHIEF COMPLAINT QUOTE
Pt coming from [facility]. pt c/o allergic reaction x 2 hrs. Pt has itching all over, burning to stomach and chest. pt states she had spaghetti and meatballs for dinner and the meatballs had pineapple in it. Pt took 50mg of benadryl prior to ems arrival. pt able to speak in full and complete sentences with no drooling noted. Respirations even and unlabored.  No complaints of chest pain, headache, nausea, vomiting  SOB, fever, chills verbalized..

## 2021-10-28 NOTE — ED ADULT TRIAGE NOTE - CHIEF COMPLAINT QUOTE
Pt coming from [facility]. pt c/o allergic reaction x 2 hrs. Pt has itching all over, burning to stomach and chest. pt states she had spaghetti and meatballs for dinner and the meatballs had pineapple in it. Pt took 50mg of benadryl prior to ems arrival. pt able to speak in full and complete sentences with no drooling noted. Respirations even and unlabored.  No complaints of chest pain, headache, nausea, vomiting  SOB, fever, chills verbalized..

## 2021-10-29 PROBLEM — J45.909 UNSPECIFIED ASTHMA, UNCOMPLICATED: Chronic | Status: ACTIVE | Noted: 2020-10-24

## 2021-10-29 PROBLEM — R56.9 UNSPECIFIED CONVULSIONS: Chronic | Status: ACTIVE | Noted: 2020-10-24

## 2021-10-29 RX ORDER — SUCRALFATE 1 G
1 TABLET ORAL
Qty: 30 | Refills: 0
Start: 2021-10-29 | End: 2021-11-07

## 2021-10-29 RX ADMIN — Medication 6 MILLIGRAM(S): at 00:16

## 2021-10-29 NOTE — ED PROVIDER NOTE - PROGRESS NOTE DETAILS
JORGE L Silverio; Pt states she feels better, EMS is bedside, discharge huddle completed, will transport pt to her residence.

## 2021-10-29 NOTE — ED PROVIDER NOTE - NSFOLLOWUPINSTRUCTIONS_ED_ALL_ED_FT
Prednisone and Carafate was sent to your pharmacy. Take this medication as prescribed. Return to the ER for fever, chills, nightsweats, difficulty speaking or breathing or for any other symptoms that concern you. Continue all previously prescribed medications as directed.  Follow up with your primary care physician in 48-72 hours- bring copies of your results.  Return to the ER for worsening or persistent symptoms, and/or ANY NEW OR CONCERNING SYMPTOMS. If you have issues obtaining follow up, please call: [phone] to obtain a doctor or specialist who takes your insurance in your area.  You may call [phone] to make an appointment with the internal medicine clinic.

## 2021-10-29 NOTE — ED PROVIDER NOTE - PATIENT PORTAL LINK FT
You can access the FollowMyHealth Patient Portal offered by [facility] by registering at the following website: [email or web address]. By joining [employer]’s FollowMyHealth portal, you will also be able to view your health information using other applications (apps) compatible with our system.

## 2021-10-29 NOTE — ED BEHAVIORAL HEALTH NOTE - BEHAVIORAL HEALTH NOTE
As per JORGE L Virk, pt is medically cleared for discharge. Pt from group home with  assistance requested for arrangement of d/c transportation. Writer provided with group home phone numbers. Writer contacted , Stephanie, at [phone]. Stephanie at this time reported that pt is not well known to the residence only there for a little over 30 days. Stephanie reported pt's history is not well known to her although pt said to have gone on own to ED 5x since living at residence. Stephanie reported pt has hx of drug seeking behaviors and will present to EDs requesting controlled substances, declining recommendations of providers. Stephanie also reports pt to have hx of MDD, possible intellectual disability and self injurious behaviors although not said to be recent. Writer requested preferred mode of transport for pt's discharge. Stephanie reports that currently pt only travels accompanied by group home staff and is not known to be independent in traveling in the community. Stephanie states that there is not current staff available to pick pt up till morning. Writer informed Stephanie that other transportation arrangements could be made via pt's insurance. [place] [employer] found to not be appropriate. Discussion of [employer] VS. AMBULANCE had to which Stephanie unable to identify which was appropriate. It was0 then agreed upon that higher level of AMBULANCE transportation would be best if unable to confirm if [employer] transport was safe for pt. Stephanie aware that pt will return this PM. Group home is [facility] at [address] ([address]) [facility] [address]. Group home with elevator access. Stephanie instructed writer to follow up with housing staff directly to provide ETA.     Writer arranged AMBULANCE transportation via [place] #[phone]. Writer spoke with Delonte who arranged transportation with [facility] ([phone]) with invoice #[id number] and ETA of 1:30am. Writer contacted pt's residence directly at [phone]. Writer spoke with Vidhi who was informed of ETA. No concerns reported. Non-emergent transportation form placed in chart. Verbal huddle occurred with interdisciplinary team.

## 2021-10-29 NOTE — ED PROVIDER NOTE - CLINICAL SUMMARY MEDICAL DECISION MAKING FREE TEXT BOX
35 Y/O F PMH Factor V Leiden, PE and DVT states that she accidentally ate food that contained Pineapple prior to arrival. States that she is having itching in her throat and skin since. Plan is labs to eval for anemia or electrolyte disturbance and will give allergic and GI medication.

## 2021-10-29 NOTE — ED PROVIDER NOTE - OBJECTIVE STATEMENT
37 Y/O F PMH Factor V Leiden, PE and DVT states that she accidentally ate food that contained Pineapple prior to arrival. States that she is having itching in her throat and skin since. States this occurred earlier this evening. Pt states that she also is having burning in her upper stomach. Pt denies fever, chills, nightsweats CP, SOB or any other sx or acute complaints.

## 2021-11-02 PROBLEM — D68.51 ACTIVATED PROTEIN C RESISTANCE: Chronic | Status: ACTIVE | Noted: 2021-10-29

## 2021-11-04 RX ORDER — HALOPERIDOL DECANOATE 100 MG/ML
1 INJECTION INTRAMUSCULAR
Qty: 0 | Refills: 0 | DISCHARGE
Start: 2021-11-04 | End: 2022-11-04

## 2021-11-05 ENCOUNTER — EMERGENCY (EMERGENCY)
Facility: HOSPITAL | Age: 36
LOS: 0 days | Discharge: HOME | End: 2021-11-05
Payer: MEDICARE

## 2021-11-05 VITALS
OXYGEN SATURATION: 99 % | HEART RATE: 70 BPM | TEMPERATURE: 98 F | HEIGHT: 68 IN | RESPIRATION RATE: 16 BRPM | DIASTOLIC BLOOD PRESSURE: 75 MMHG | WEIGHT: 278 LBS | SYSTOLIC BLOOD PRESSURE: 99 MMHG

## 2021-11-05 VITALS
OXYGEN SATURATION: 98 % | SYSTOLIC BLOOD PRESSURE: 131 MMHG | RESPIRATION RATE: 18 BRPM | DIASTOLIC BLOOD PRESSURE: 78 MMHG | TEMPERATURE: 98 F | HEART RATE: 67 BPM

## 2021-11-05 DIAGNOSIS — Z20.822 CONTACT WITH AND (SUSPECTED) EXPOSURE TO COVID-19: ICD-10-CM

## 2021-11-05 DIAGNOSIS — M79.662 PAIN IN LEFT LOWER LEG: ICD-10-CM

## 2021-11-05 DIAGNOSIS — Z88.8 ALLERGY STATUS TO OTHER DRUGS, MEDICAMENTS AND BIOLOGICAL SUBSTANCES STATUS: ICD-10-CM

## 2021-11-05 DIAGNOSIS — Z79.01 LONG TERM (CURRENT) USE OF ANTICOAGULANTS: ICD-10-CM

## 2021-11-05 DIAGNOSIS — Z91.040 LATEX ALLERGY STATUS: ICD-10-CM

## 2021-11-05 DIAGNOSIS — Z88.0 ALLERGY STATUS TO PENICILLIN: ICD-10-CM

## 2021-11-05 DIAGNOSIS — Z86.718 PERSONAL HISTORY OF OTHER VENOUS THROMBOSIS AND EMBOLISM: ICD-10-CM

## 2021-11-05 LAB
ALBUMIN SERPL ELPH-MCNC: 3.5 G/DL — SIGNIFICANT CHANGE UP (ref 3.3–5)
ALP SERPL-CCNC: 62 U/L — SIGNIFICANT CHANGE UP (ref 40–120)
ALT FLD-CCNC: 23 U/L — SIGNIFICANT CHANGE UP (ref 12–78)
ANION GAP SERPL CALC-SCNC: 7 MMOL/L — SIGNIFICANT CHANGE UP (ref 5–17)
AST SERPL-CCNC: 7 U/L — LOW (ref 15–37)
BILIRUB SERPL-MCNC: 0.2 MG/DL — SIGNIFICANT CHANGE UP (ref 0.2–1.2)
BUN SERPL-MCNC: 16 MG/DL — SIGNIFICANT CHANGE UP (ref 7–23)
CALCIUM SERPL-MCNC: 9 MG/DL — SIGNIFICANT CHANGE UP (ref 8.5–10.1)
CHLORIDE SERPL-SCNC: 109 MMOL/L — HIGH (ref 96–108)
CO2 SERPL-SCNC: 29 MMOL/L — SIGNIFICANT CHANGE UP (ref 22–31)
CREAT SERPL-MCNC: 0.91 MG/DL — SIGNIFICANT CHANGE UP (ref 0.5–1.3)
FLUAV AG NPH QL: SIGNIFICANT CHANGE UP
FLUBV AG NPH QL: SIGNIFICANT CHANGE UP
GLUCOSE SERPL-MCNC: 86 MG/DL — SIGNIFICANT CHANGE UP (ref 70–99)
HCT VFR BLD CALC: 37.9 % — SIGNIFICANT CHANGE UP (ref 34.5–45)
HGB BLD-MCNC: 12.1 G/DL — SIGNIFICANT CHANGE UP (ref 11.5–15.5)
MCHC RBC-ENTMCNC: 29.3 PG — SIGNIFICANT CHANGE UP (ref 27–34)
MCHC RBC-ENTMCNC: 31.9 GM/DL — LOW (ref 32–36)
MCV RBC AUTO: 91.8 FL — SIGNIFICANT CHANGE UP (ref 80–100)
NRBC # BLD: 0 /100 WBCS — SIGNIFICANT CHANGE UP (ref 0–0)
PLATELET # BLD AUTO: 150 K/UL — SIGNIFICANT CHANGE UP (ref 150–400)
POTASSIUM SERPL-MCNC: 4.1 MMOL/L — SIGNIFICANT CHANGE UP (ref 3.5–5.3)
POTASSIUM SERPL-SCNC: 4.1 MMOL/L — SIGNIFICANT CHANGE UP (ref 3.5–5.3)
PROT SERPL-MCNC: 7.4 GM/DL — SIGNIFICANT CHANGE UP (ref 6–8.3)
RBC # BLD: 4.13 M/UL — SIGNIFICANT CHANGE UP (ref 3.8–5.2)
RBC # FLD: 14.7 % — HIGH (ref 10.3–14.5)
SARS-COV-2 RNA SPEC QL NAA+PROBE: SIGNIFICANT CHANGE UP
SODIUM SERPL-SCNC: 145 MMOL/L — SIGNIFICANT CHANGE UP (ref 135–145)
VALPROATE SERPL-MCNC: 86 UG/ML — SIGNIFICANT CHANGE UP (ref 50–100)
WBC # BLD: 9.16 K/UL — SIGNIFICANT CHANGE UP (ref 3.8–10.5)
WBC # FLD AUTO: 9.16 K/UL — SIGNIFICANT CHANGE UP (ref 3.8–10.5)

## 2021-11-05 PROCEDURE — 99284 EMERGENCY DEPT VISIT MOD MDM: CPT

## 2021-11-05 PROCEDURE — 93971 EXTREMITY STUDY: CPT | Mod: 26

## 2021-11-05 RX ORDER — ACETAMINOPHEN 500 MG
1 TABLET ORAL
Qty: 28 | Refills: 0
Start: 2021-11-05 | End: 2021-11-11

## 2021-11-05 NOTE — ED PROVIDER NOTE - MUSCULOSKELETAL, MLM
Spine appears normal, range of motion is not limited, left lower leg TTP, no swelling or erythema normal gait

## 2021-11-05 NOTE — ED ADULT NURSE NOTE - NSFALLRSKOUTCOME_ED_ALL_ED
Drawn this morning at 8:30. I am assuming with overnight  that we still may not get results until Thursday. If you do see them tomorrow, can you send message to Dr Mcbride with EBV? Thanks. Janelle [employer]

## 2021-11-05 NOTE — ED ADULT NURSE NOTE - OBJECTIVE STATEMENT
c/wookpgcgzcyanpgcxl0zkc.deniesSB/ha/CP/DIZZINESS/N/V/DATTHISTIME.ambulateswithteadygait.swellingnoted.noopenskinnoted. c/o left lower front leg x1day. denies SB/ha/CP/DIZZINESS/N/V/DATTHISTIME. ambulates with steady gait. swelling noted. no open skin noted.

## 2021-11-05 NOTE — ED PROVIDER NOTE - PATIENT PORTAL LINK FT
You can access the FollowMyHealth Patient Portal offered by [facility] by registering at the following website: [email or web address]. By joining [employer]’s FollowMyHealth portal, you will also be able to view your health information using other applications (apps) compatible with our system.

## 2021-11-05 NOTE — ED PROVIDER NOTE - RESPIRATORY NEGATIVE STATEMENT, MLM
Patient/Caregiver provided printed discharge information. no chest pain, no cough, and no shortness of breath.

## 2021-11-05 NOTE — ED PROVIDER NOTE - CLINICAL SUMMARY MEDICAL DECISION MAKING FREE TEXT BOX
37 y/o F with left 37 y/o F with left lower leg pain, hx of DVT on xarelto, NAD, pain improved with meds, doppler negative dc home to f/u with PMD return to ED if worsening symptoms.

## 2021-11-05 NOTE — ED PROVIDER NOTE - OBJECTIVE STATEMENT
35 y/o F with PMHX of epilepsy, HTN, DVT on xarelto, thyroid disease presents to ED c/o left lower leg pain since yesterday, same leg she had the DVT last April 2021 no associated symptoms denies trauma, numbness, fever, N/V dizziness chest pain and SOB.

## 2021-12-17 ENCOUNTER — LABORATORY RESULT (OUTPATIENT)
Age: 36
End: 2021-12-17

## 2021-12-17 ENCOUNTER — OUTPATIENT (OUTPATIENT)
Dept: OUTPATIENT SERVICES | Facility: HOSPITAL | Age: 36
LOS: 1 days | End: 2021-12-17
Payer: COMMERCIAL

## 2021-12-17 ENCOUNTER — APPOINTMENT (OUTPATIENT)
Dept: PEDIATRIC ALLERGY IMMUNOLOGY | Facility: CLINIC | Age: 36
End: 2021-12-17
Payer: MEDICARE

## 2021-12-17 VITALS
SYSTOLIC BLOOD PRESSURE: 109 MMHG | BODY MASS INDEX: 40.62 KG/M2 | TEMPERATURE: 97.16 F | HEART RATE: 105 BPM | OXYGEN SATURATION: 95 % | HEIGHT: 68 IN | WEIGHT: 268 LBS | DIASTOLIC BLOOD PRESSURE: 74 MMHG

## 2021-12-17 DIAGNOSIS — R49.0 DYSPHONIA: ICD-10-CM

## 2021-12-17 DIAGNOSIS — R06.7 SNEEZING: ICD-10-CM

## 2021-12-17 DIAGNOSIS — T78.1XXA OTHER ADVERSE FOOD REACTIONS, NOT ELSEWHERE CLASSIFIED, INITIAL ENCOUNTER: ICD-10-CM

## 2021-12-17 DIAGNOSIS — J30.9 ALLERGIC RHINITIS, UNSPECIFIED: ICD-10-CM

## 2021-12-17 DIAGNOSIS — J30.89 OTHER ALLERGIC RHINITIS: ICD-10-CM

## 2021-12-17 DIAGNOSIS — L25.3 UNSPECIFIED CONTACT DERMATITIS DUE TO OTHER CHEMICAL PRODUCTS: ICD-10-CM

## 2021-12-17 DIAGNOSIS — Z88.9 ALLERGY STATUS TO UNSPECIFIED DRUGS, MEDICAMENTS AND BIOLOGICAL SUBSTANCES: ICD-10-CM

## 2021-12-17 PROCEDURE — 99204 OFFICE O/P NEW MOD 45 MIN: CPT

## 2021-12-17 PROCEDURE — G0463: CPT | Mod: 25

## 2021-12-17 PROCEDURE — 36415 COLL VENOUS BLD VENIPUNCTURE: CPT

## 2021-12-17 NOTE — REASON FOR VISIT
[Initial Consultation] : an initial consultation for [FreeTextEntry2] : food and drug allergy evaluation [Formal Caregiver] : formal caregiver

## 2021-12-17 NOTE — REVIEW OF SYSTEMS
[Rhinorrhea] : rhinorrhea [Nasal Congestion] : nasal congestion [Nasal Itching] : nasal itching [Hoarseness] : hoarseness [Sneezing] : sneezing [Nl] : Genitourinary [Sore Throat] : no sore throat [Post Nasal Drip] : no post nasal drip

## 2021-12-17 NOTE — SOCIAL HISTORY
[Apartment] : [unfilled] lives in an apartment  [Radiator/Baseboard] : heating provided by radiator(s)/baseboard(s) [Window Units] : air conditioning provided by window units [None] : none [Single] : single

## 2021-12-17 NOTE — PHYSICAL EXAM
[Alert] : alert [Well Nourished] : well nourished [Healthy Appearance] : healthy appearance [No Acute Distress] : no acute distress [Well Developed] : well developed [Normal Pupil & Iris Size/Symmetry] : normal pupil and iris size and symmetry [No Discharge] : no discharge [No Photophobia] : no photophobia [Sclera Not Icteric] : sclera not icteric [Normal TMs] : both tympanic membranes were normal [Normal Lips/Tongue] : the lips and tongue were normal [Normal Outer Ear/Nose] : the ears and nose were normal in appearance [Normal Tonsils] : normal tonsils [No Thrush] : no thrush [Clear Rhinorrhea] : clear rhinorrhea was seen [Supple] : the neck was supple [Normal Rate and Effort] : normal respiratory rhythm and effort [No Crackles] : no crackles [No Retractions] : no retractions [Bilateral Audible Breath Sounds] : bilateral audible breath sounds [Normal Rate] : heart rate was normal  [Normal S1, S2] : normal S1 and S2 [No murmur] : no murmur [Regular Rhythm] : with a regular rhythm [Soft] : abdomen soft [Not Tender] : non-tender [Not Distended] : not distended [No HSM] : no hepato-splenomegaly [Normal Cervical Lymph Nodes] : cervical [Skin Intact] : skin intact  [No Rash] : no rash [No Skin Lesions] : no skin lesions [No clubbing] : no clubbing [No Edema] : no edema [No Cyanosis] : no cyanosis [Normal Mood] : mood was normal [Normal Affect] : affect was normal [Alert, Awake, Oriented as Age-Appropriate] : alert, awake, oriented as age appropriate [Conjunctival Erythema] : no conjunctival erythema [Suborbital Bogginess] : no suborbital bogginess (allergic shiners) [Pale mucosa] : no pale mucosa [Boggy Nasal Turbinates] : no boggy and/or pale nasal turbinates [Pharyngeal erythema] : no pharyngeal erythema [Posterior Pharyngeal Cobblestoning] : no posterior pharyngeal cobblestoning [Exudate] : no exudate [Wheezing] : no wheezing was heard [Patches] : no patches [No Motor Deficits] : the motor exam was normal

## 2021-12-17 NOTE — CONSULT LETTER
[Dear  ___] : Dear  [unfilled], [Consult Letter:] : I had the pleasure of evaluating your patient, [unfilled]. [Please see my note below.] : Please see my note below. [Consult Closing:] : Thank you very much for allowing me to participate in the care of this patient.  If you have any questions, please do not hesitate to contact me. [Sincerely,] : Sincerely, [FreeTextEntry2] : MEAGAN MADRID [FreeTextEntry3] : Ryan Boucher MD\par Fellow, Division of Allergy & Immunology\par \par Joo Cuba III  MPH, MD, PhD, FACP, FACAAI, FAAAAI \par , Departments of Medicine and Pediatrics \par [employer] [facility] \par , [facility] [employer] [facility] [employer] [facility] \par Attending Physician, Division of Allergy & Immunology [facility]\par \par \par
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
## 2021-12-29 ENCOUNTER — EMERGENCY (EMERGENCY)
Facility: HOSPITAL | Age: 36
LOS: 1 days | Discharge: HOME | End: 2021-12-29
Attending: EMERGENCY MEDICINE | Admitting: EMERGENCY MEDICINE
Payer: MEDICARE

## 2021-12-29 VITALS
OXYGEN SATURATION: 100 % | DIASTOLIC BLOOD PRESSURE: 97 MMHG | RESPIRATION RATE: 18 BRPM | HEART RATE: 105 BPM | HEIGHT: 68 IN | TEMPERATURE: 97 F | SYSTOLIC BLOOD PRESSURE: 135 MMHG

## 2021-12-29 VITALS
RESPIRATION RATE: 13 BRPM | HEART RATE: 94 BPM | TEMPERATURE: 98 F | DIASTOLIC BLOOD PRESSURE: 95 MMHG | OXYGEN SATURATION: 100 % | SYSTOLIC BLOOD PRESSURE: 121 MMHG

## 2021-12-29 LAB
ALBUMIN SERPL ELPH-MCNC: 4.7 G/DL — SIGNIFICANT CHANGE UP (ref 3.3–5)
ALP SERPL-CCNC: 59 U/L — SIGNIFICANT CHANGE UP (ref 40–120)
ALT FLD-CCNC: 13 U/L — SIGNIFICANT CHANGE UP (ref 4–33)
ANION GAP SERPL CALC-SCNC: 11 MMOL/L — SIGNIFICANT CHANGE UP (ref 7–14)
APTT BLD: 34.2 SEC — SIGNIFICANT CHANGE UP (ref 27–36.3)
AST SERPL-CCNC: 10 U/L — SIGNIFICANT CHANGE UP (ref 4–32)
BASOPHILS # BLD AUTO: 0.02 K/UL — SIGNIFICANT CHANGE UP (ref 0–0.2)
BASOPHILS NFR BLD AUTO: 0.3 % — SIGNIFICANT CHANGE UP (ref 0–2)
BILIRUB SERPL-MCNC: 0.2 MG/DL — SIGNIFICANT CHANGE UP (ref 0.2–1.2)
BLD GP AB SCN SERPL QL: NEGATIVE — SIGNIFICANT CHANGE UP
BUN SERPL-MCNC: 12 MG/DL — SIGNIFICANT CHANGE UP (ref 7–23)
CALCIUM SERPL-MCNC: 9.6 MG/DL — SIGNIFICANT CHANGE UP (ref 8.4–10.5)
CHLORIDE SERPL-SCNC: 110 MMOL/L — HIGH (ref 98–107)
CO2 SERPL-SCNC: 21 MMOL/L — LOW (ref 22–31)
CREAT SERPL-MCNC: 0.86 MG/DL — SIGNIFICANT CHANGE UP (ref 0.5–1.3)
EOSINOPHIL # BLD AUTO: 0.03 K/UL — SIGNIFICANT CHANGE UP (ref 0–0.5)
EOSINOPHIL NFR BLD AUTO: 0.5 % — SIGNIFICANT CHANGE UP (ref 0–6)
GLUCOSE SERPL-MCNC: 93 MG/DL — SIGNIFICANT CHANGE UP (ref 70–99)
HCT VFR BLD CALC: 34.3 % — LOW (ref 34.5–45)
HGB BLD-MCNC: 11 G/DL — LOW (ref 11.5–15.5)
IANC: 3.67 K/UL — SIGNIFICANT CHANGE UP (ref 1.5–8.5)
IMM GRANULOCYTES NFR BLD AUTO: 0.3 % — SIGNIFICANT CHANGE UP (ref 0–1.5)
INR BLD: 1.62 RATIO — HIGH (ref 0.88–1.16)
LYMPHOCYTES # BLD AUTO: 1.95 K/UL — SIGNIFICANT CHANGE UP (ref 1–3.3)
LYMPHOCYTES # BLD AUTO: 31.5 % — SIGNIFICANT CHANGE UP (ref 13–44)
MCHC RBC-ENTMCNC: 30.1 PG — SIGNIFICANT CHANGE UP (ref 27–34)
MCHC RBC-ENTMCNC: 32.1 GM/DL — SIGNIFICANT CHANGE UP (ref 32–36)
MCV RBC AUTO: 94 FL — SIGNIFICANT CHANGE UP (ref 80–100)
MONOCYTES # BLD AUTO: 0.5 K/UL — SIGNIFICANT CHANGE UP (ref 0–0.9)
MONOCYTES NFR BLD AUTO: 8.1 % — SIGNIFICANT CHANGE UP (ref 2–14)
NEUTROPHILS # BLD AUTO: 3.67 K/UL — SIGNIFICANT CHANGE UP (ref 1.8–7.4)
NEUTROPHILS NFR BLD AUTO: 59.3 % — SIGNIFICANT CHANGE UP (ref 43–77)
NRBC # BLD: 0 /100 WBCS — SIGNIFICANT CHANGE UP
NRBC # FLD: 0 K/UL — SIGNIFICANT CHANGE UP
PLATELET # BLD AUTO: 191 K/UL — SIGNIFICANT CHANGE UP (ref 150–400)
POTASSIUM SERPL-MCNC: 4.1 MMOL/L — SIGNIFICANT CHANGE UP (ref 3.5–5.3)
POTASSIUM SERPL-SCNC: 4.1 MMOL/L — SIGNIFICANT CHANGE UP (ref 3.5–5.3)
PROT SERPL-MCNC: 7.7 G/DL — SIGNIFICANT CHANGE UP (ref 6–8.3)
PROTHROM AB SERPL-ACNC: 18.1 SEC — HIGH (ref 10.6–13.6)
RBC # BLD: 3.65 M/UL — LOW (ref 3.8–5.2)
RBC # FLD: 15.9 % — HIGH (ref 10.3–14.5)
RH IG SCN BLD-IMP: POSITIVE — SIGNIFICANT CHANGE UP
SARS-COV-2 RNA SPEC QL NAA+PROBE: SIGNIFICANT CHANGE UP
SODIUM SERPL-SCNC: 142 MMOL/L — SIGNIFICANT CHANGE UP (ref 135–145)
WBC # BLD: 6.19 K/UL — SIGNIFICANT CHANGE UP (ref 3.8–10.5)
WBC # FLD AUTO: 6.19 K/UL — SIGNIFICANT CHANGE UP (ref 3.8–10.5)

## 2021-12-29 PROCEDURE — 99285 EMERGENCY DEPT VISIT HI MDM: CPT | Mod: 25

## 2021-12-29 PROCEDURE — 93010 ELECTROCARDIOGRAM REPORT: CPT

## 2021-12-29 PROCEDURE — 71275 CT ANGIOGRAPHY CHEST: CPT | Mod: 26,MA

## 2021-12-29 RX ORDER — TOPIRAMATE 25 MG
75 TABLET ORAL ONCE
Refills: 0 | Status: COMPLETED | OUTPATIENT
Start: 2021-12-29 | End: 2021-12-29

## 2021-12-29 RX ORDER — IBUPROFEN 200 MG
600 TABLET ORAL ONCE
Refills: 0 | Status: COMPLETED | OUTPATIENT
Start: 2021-12-29 | End: 2021-12-29

## 2021-12-29 RX ORDER — HALOPERIDOL DECANOATE 100 MG/ML
5 INJECTION INTRAMUSCULAR ONCE
Refills: 0 | Status: COMPLETED | OUTPATIENT
Start: 2021-12-29 | End: 2021-12-29

## 2021-12-29 RX ORDER — ACETAMINOPHEN 500 MG
975 TABLET ORAL ONCE
Refills: 0 | Status: COMPLETED | OUTPATIENT
Start: 2021-12-29 | End: 2021-12-29

## 2021-12-29 RX ORDER — SODIUM CHLORIDE 9 MG/ML
1000 INJECTION INTRAMUSCULAR; INTRAVENOUS; SUBCUTANEOUS ONCE
Refills: 0 | Status: COMPLETED | OUTPATIENT
Start: 2021-12-29 | End: 2021-12-29

## 2021-12-29 RX ORDER — QUETIAPINE FUMARATE 200 MG/1
50 TABLET, FILM COATED ORAL ONCE
Refills: 0 | Status: COMPLETED | OUTPATIENT
Start: 2021-12-29 | End: 2021-12-29

## 2021-12-29 RX ADMIN — QUETIAPINE FUMARATE 50 MILLIGRAM(S): 200 TABLET, FILM COATED ORAL at 20:53

## 2021-12-29 RX ADMIN — Medication 75 MILLIGRAM(S): at 20:54

## 2021-12-29 RX ADMIN — Medication 600 MILLIGRAM(S): at 21:18

## 2021-12-29 RX ADMIN — SODIUM CHLORIDE 1000 MILLILITER(S): 9 INJECTION INTRAMUSCULAR; INTRAVENOUS; SUBCUTANEOUS at 17:47

## 2021-12-29 RX ADMIN — SODIUM CHLORIDE 1000 MILLILITER(S): 9 INJECTION INTRAMUSCULAR; INTRAVENOUS; SUBCUTANEOUS at 20:37

## 2021-12-29 RX ADMIN — HALOPERIDOL DECANOATE 5 MILLIGRAM(S): 100 INJECTION INTRAMUSCULAR at 20:53

## 2021-12-29 RX ADMIN — Medication 975 MILLIGRAM(S): at 22:05

## 2021-12-29 NOTE — ED PROVIDER NOTE - CLINICAL SUMMARY MEDICAL DECISION MAKING FREE TEXT BOX
37 y/o female pmh factor V leiden, DVT/PE on xarelto, endometrioses c/o vaginal bleeding, chest pain and sob- will check labs, CTA chest, ekg, reassess.

## 2021-12-29 NOTE — ED PROVIDER NOTE - PATIENT PORTAL LINK FT
You can access the FollowMyHealth Patient Portal offered by [facility] by registering at the following website: [email or web address]. By joining [employer]’s FollowMyHealth portal, you will also be able to view your health information using other applications (apps) compatible with our system.

## 2021-12-29 NOTE — ED ADULT NURSE NOTE - OBJECTIVE STATEMENT
Pt arrives to [facility] presents with chest pain described as constant. Pt a&ox3, ambulatory at baseline, skin intact, respirations even and unlabored, pt NSR on CM, 20G Iv placed in rt arm, labs drawn and sent, pt medicated as per ordered.

## 2021-12-29 NOTE — ED ADULT NURSE REASSESSMENT NOTE - NS ED NURSE REASSESS COMMENT FT1
pt A&ox4 complaining of 10 out of 10 , midsternal chest pain. JORGE L Rosario at bedside upon his examination pt states the pain moved to her right side of chest. pt HR 94 and NSR on telemetry. no acute distress noted. pt does not appear in distress and is relaxing comfortable in chair, watching movie on her phone. awaiting orders from MD.

## 2021-12-29 NOTE — ED PROVIDER NOTE - OBJECTIVE STATEMENT
35 y/o female pmh factor V leiden, DVT/PE on xarelto, endometriosis on OCP c/o vaginal bleeding x5 days. Pt admits to heavy vaginal bleeding, requiring the use of multiple pads per day(pt did not count). Pt also admits to pleuritic chest pain and sob x 4 days. Pt admits to generalized weakness. Pt denies diaphoresis, n/v/d, abd pain, dysuria, numbness, tingling, fever or chills.

## 2021-12-29 NOTE — ED ADULT NURSE NOTE - CHIEF COMPLAINT QUOTE
Pt c/o midsternal chest pain started on Sunday, radiating to her neck and back. c/o dizziness and weakness. c/o chest pain with deep inspiration. Pt states she has a history of blood clot, takes Xarelto. Also c/o vaginal bleeding since Saturday with clots.

## 2021-12-29 NOTE — ED PROVIDER NOTE - ATTENDING CONTRIBUTION TO CARE
Agree with above- pt well appearing, EKG non ischemic with sinus tach- plan for labs, CT to eval for PE. DDx including anemia from vaginal bleeding vs PE.

## 2021-12-29 NOTE — ED PROVIDER NOTE - NSFOLLOWUPINSTRUCTIONS_ED_ALL_ED_FT
1) Follow up with your provider or contact [phone] to assist with scheduling appointment with PCP/specialist for further care  2) Return to the ED immediately for new or worsening symptoms including fever, nausea/vomiting or dizziness  3) Please continue to take any home medications as prescribed. Take tylenol 325 mg every 4 hours for pain relief/fever control or ibuprofen 600 mg every 6 hours for pain relief/fever control  4) Your test results from your ED visit were discussed with you prior to discharge  5) You were provided with a copy of your test results

## 2021-12-29 NOTE — ED PROVIDER NOTE - NSFOLLOWUPCLINICS_GEN_ALL_ED_FT
[facility] Gynecology and Obstetrics  Gynceology/OB  [address] [place] [address]  Phone: [phone]  Fax:   Follow Up Time: Urgent

## 2021-12-29 NOTE — ED ADULT TRIAGE NOTE - CHIEF COMPLAINT QUOTE
Pt c/o midsternal chest pain started on Sunday, radiating to her neck and back. c/o dizziness and weakness. c/o chest pain with deep inspiration. Pt states she has a history of blood clot, takes Xarelto. Pt c/o midsternal chest pain started on Sunday, radiating to her neck and back. c/o dizziness and weakness. c/o chest pain with deep inspiration. Pt states she has a history of blood clot, takes Xarelto. Also c/o vaginal bleeding since Saturday with clots.

## 2021-12-30 PROCEDURE — 93971 EXTREMITY STUDY: CPT | Mod: 26,LT

## 2021-12-30 NOTE — PROVIDER CONTACT NOTE (OTHER) - ASSESSMENT
Per provider, patient is cleared and is able to return to their previous residence, [facility] at [address] [place] [address] ,  #[phone].  has spoken to GORDON Jolly, confirmed that patients mode of transportation is [place] and that patient travels independently. Clinical provider is in agreement with ambulette back to group home.  senior ride  confirmation # [id number] Verbal huddle regarding coordination of care completed with interdisciplinary team.

## 2022-01-02 NOTE — ED ADULT TRIAGE NOTE - BRAND OF COVID-19 VACCINATION
"Subjective:       Patient ID: Shanice Arechiga is a 42 y.o. female.    Vitals:  height is 5' 2" (1.575 m) and weight is 104.3 kg (230 lb). Her temperature is 97.3 °F (36.3 °C). Her blood pressure is 117/76 and her pulse is 85. Her respiration is 16 and oxygen saturation is 98%.     Chief Complaint: Headache and Sore Throat    Patient presents to  for evaluation of headache, itchy & sore throat along with minor cough x 2 days. Patient has taken as needed unknown amount of Advil x 2 days with no relief. Patient is COVID vaccinated. Patient reports exposure to COVID.  Afebrile. Denies chest pain, SOB, GI upset, or abdominal pain.       Headache   This is a new problem. The current episode started in the past 7 days. The problem occurs intermittently. The problem has been unchanged. The pain is located in the frontal region. The pain radiates to the right neck and left neck. The pain is at a severity of 7/10. The pain is severe. Associated symptoms include coughing and a sore throat. Pertinent negatives include no abdominal pain, back pain, fever, nausea or vomiting. She has tried acetaminophen for the symptoms. The treatment provided no relief.   Sore Throat   Associated symptoms include coughing and headaches. Pertinent negatives include no abdominal pain, diarrhea, shortness of breath or vomiting.       Constitution: Negative for chills and fever.   HENT: Positive for sore throat.    Cardiovascular: Negative for chest pain.   Respiratory: Positive for cough. Negative for shortness of breath.    Gastrointestinal: Negative for abdominal pain, nausea, vomiting, constipation and diarrhea.   Musculoskeletal: Negative for back pain and muscle ache.   Skin: Negative for rash.   Neurological: Positive for headaches.       Objective:      Physical Exam   Constitutional: She is oriented to person, place, and time. She appears well-developed and well-nourished. She is cooperative.  Non-toxic appearance. She does not have a " sickly appearance. She does not appear ill. No distress.   HENT:   Head: Normocephalic and atraumatic.   Ears:   Right Ear: Hearing, tympanic membrane, external ear and ear canal normal. Tympanic membrane is not injected, not erythematous and not bulging. No middle ear effusion. impacted cerumen  Left Ear: Hearing, tympanic membrane, external ear and ear canal normal. Tympanic membrane is not injected, not erythematous and not bulging.  No middle ear effusion. impacted cerumen  Nose: Nose normal. No mucosal edema, rhinorrhea, nasal deformity or congestion. No epistaxis. Right sinus exhibits no maxillary sinus tenderness and no frontal sinus tenderness. Left sinus exhibits no maxillary sinus tenderness and no frontal sinus tenderness.   Mouth/Throat: Uvula is midline and mucous membranes are normal. No trismus in the jaw. Normal dentition. No uvula swelling. Posterior oropharyngeal erythema (mild; PND noted) present. No oropharyngeal exudate, posterior oropharyngeal edema, tonsillar abscesses or cobblestoning.   Eyes: Conjunctivae and lids are normal. Right eye exhibits no discharge. Left eye exhibits no discharge. No scleral icterus.   Neck: Trachea normal and phonation normal. Neck supple. No edema present. No erythema present. No neck rigidity present.   Cardiovascular: Normal rate, regular rhythm, normal heart sounds, intact distal pulses and normal pulses.   No murmur heard.Exam reveals no gallop and no friction rub.   Pulmonary/Chest: Effort normal and breath sounds normal. No stridor. No respiratory distress. She has no decreased breath sounds. She has no wheezes. She has no rhonchi. She has no rales.   Abdominal: Normal appearance.   Musculoskeletal: Normal range of motion.         General: No deformity or edema. Normal range of motion.   Lymphadenopathy:        Head (right side): No submandibular and no tonsillar adenopathy present.        Head (left side): No submandibular and no tonsillar adenopathy  present.     She has no cervical adenopathy.        Right cervical: No superficial cervical and no posterior cervical adenopathy present.       Left cervical: No superficial cervical and no posterior cervical adenopathy present.   Neurological: She is alert and oriented to person, place, and time. She exhibits normal muscle tone. Coordination normal.   Skin: Skin is warm, dry, intact, not diaphoretic and not pale.   Psychiatric: She has a normal mood and affect. Her speech is normal and behavior is normal. Judgment and thought content normal. Cognition and memory  Nursing note and vitals reviewed.        Results for orders placed or performed in visit on 01/02/22   POCT COVID-19 Rapid Screening   Result Value Ref Range    POC Rapid COVID Negative Negative     Acceptable Yes        Assessment:       1. Upper respiratory tract infection, unspecified type    2. Acute nonintractable headache, unspecified headache type        Plan:       Patient was Covid-19 Rapid tested at time of visit and is negative at this time. Reviewed results with patient. Discussed with patient that their symptoms are consistent with a URI at this time. Flonase should be used twice daily and take a daily Zyrtec to resolve sinus inflammation and post-nasal drip. Discussed OTC options for symptomatic relief. Should symptoms persist or worsen you may return to clinic for evaluation or follow-up with your PCP. Patient verbalized understanding and all of their questions were answered.     Upper respiratory tract infection, unspecified type    Acute nonintractable headache, unspecified headache type  -     POCT COVID-19 Rapid Screening      Patient Instructions     Patient Education       Viral Upper Respiratory Infection Discharge Instructions, Adult   About this topic   You have an upper respiratory infection or URI. A URI can affect your nose, throat, ears, and sinuses. A virus is the cause of almost all URIs and antibiotics will not  help you feel better more quickly. The common cold is an example of a viral URI.  URIs are easy to spread from person to person, most often through coughing or sneezing. A URI will almost always get better in a week or two without any treatment.         What care is needed at home?   · Ask your doctor what you need to do when you go home. Make sure you ask questions if you do not understand what the doctor says.  · If you smoke, try to quit. Your doctor or nurse can help.  · Drink lots of fluids like water, juice, or broth. This will help replace any fluids lost if you have a runny nose or fever. Warm tea or soup can help soothe a sore throat.  · If the air in your home feels dry, use a cool mist humidifier. This can help a stuffy nose and make it easier to breathe.  · You can also use saline nose drops to relieve stuffiness.  · If you decide to take over-the-counter cough or cold medicines, follow the directions on the label carefully. Be sure you do not take more than 1 medicine that contains acetaminophen. Also, if you have a heart problem or high blood pressure, check with your doctor before you take any of these medicines.  · Wash your hands often. Cough or sneeze into a tissue or your elbow instead of your hands. This will help keep others healthy.  What follow-up care is needed?   Your doctor may ask you to make visits to the office to check on your progress. Be sure to keep these visits.  What drugs may be needed?   The doctor may order drugs to:  · Open up the tubes of your lungs  · Treat viral infection  · Relieve or stop coughing  · Help with pain from a sore throat  · Relieve runny and stuffy nose  · Provide oxygen  Will physical activity be limited?   You need to rest for a few days to let your body recover from the infection.  What changes to diet are needed?   Eat soft foods like soup if swallowing is too painful.  What problems could happen?   · Asthma attack  · Sinus infections  · Lung problems like  pneumonia and bronchitis  · Severe fluid loss. This is dehydration.  What can be done to prevent this health problem?   · Wash your hands often with soap and water for at least 20 seconds, especially after coughing or sneezing. Alcohol-based hand sanitizers also work to kill the virus.  · If you are sick, cover your mouth and nose with tissue when you cough or sneeze. You can also cough into your elbow. Throw away tissues in the trash and wash your hands after touching used tissues.  · Do not get too close (kissing, hugging) to people who are sick.  · Do not share towels or hankies with anyone who is sick. Clean commonly handled things like door handles, remotes, toys, and phones. Wipe them with a disinfectant.  · Stay away from crowded places.  · Cover your nose and mouth when you sneeze or cough.  · Take vitamin C to help build up your body's ability to fight disease.  · Get a flu shot each year.  When do I need to call the doctor?   · You have trouble breathing when talking or sitting still.  · You have a fever of 100.4°F (38°C) or higher for several days, chills, a very bad sore throat, or ear or sinus pain.  · You develop a new fever after several days of feeling the same or improving.  · You develop chest pain when you cough.  · You have a cough that lasts more than 10 days.  · You cough up blood, or the color of the mucus you cough up changes.  Teach Back: Helping You Understand   The Teach Back Method helps you understand the information we are giving you. After you talk with the staff, tell them in your own words what you learned. This helps to make sure the staff has described each thing clearly. It also helps to explain things that may have been confusing. Before going home, make sure you can do these:  · I can tell you about my condition.  · I can tell you what may help ease my signs.  · I can tell you what I will do if I have a fever, chills, breathing very fast, or trouble breathing.  Where can I learn  more?   American Lung Association  https://www.lung.org/blog/can-you-exercise-with-a-cold   American Lung Association  https://www.lung.org/lung-health-diseases/lung-disease-lookup/influenza/facts-about-the-common-cold   NHS Choices  https://www.nhs.uk/conditions/respiratory-tract-infection/   UpToDate  https://www.[employer]/contents/the-common-cold-in-adults-beyond-the-basics   Last Reviewed Date   2021-06-08  Consumer Information Use and Disclaimer   This information is not specific medical advice and does not replace information you receive from your health care provider. This is only a brief summary of general information. It does NOT include all information about conditions, illnesses, injuries, tests, procedures, treatments, therapies, discharge instructions or life-style choices that may apply to you. You must talk with your health care provider for complete information about your health and treatment options. This information should not be used to decide whether or not to accept your health care providers advice, instructions or recommendations. Only your health care provider has the knowledge and training to provide advice that is right for you.  Copyright   Copyright © 2021 UpToDate, Inc. and its affiliates and/or licensors. All rights reserved.    Patient Instructions   -Below are suggestions for symptomatic relief:              -Tylenol every 4 hours OR ibuprofen every 6 hours as needed for pain/fever.              -Salt water gargles to soothe throat pain.              -Chloroseptic spray also helps to numb throat pain.              -Nasal saline spray reduces inflammation and dryness.              -Warm face compresses to help with facial sinus pain/pressure.              -Vicks vapor rub at night.              -Flonase OTC or Nasacort OTC for nasal congestion.              -Simple foods like chicken noodle soup.              -Delsym helps with coughing at night              -Zyrtec/Claritin during  the day & Benadryl at night may help with allergies.                If you DO NOT have Hypertension or any history of palpitations, it is ok to take over the counter Sudafed or Mucinex D or Allegra-D or Claritin-D or Zyrtec-D.  If you do take one of the above, it is ok to combine that with plain over the counter Mucinex or Allegra or Claritin or Zyrtec. If, for example, you are taking Zyrtec -D, you can combine that with Mucinex, but not Mucinex-D.  If you are taking Mucinex-D, you can combine that with plain Allegra or Claritin or Zyrtec.   If you DO have Hypertension or palpitations, it is safe to take Coricidin HBP for relief of sinus symptoms.      Please follow up with your Primary care provider within 2-5 days if your signs and symptoms have not resolved or worsen.     If your condition worsens or fails to improve we recommend that you receive another evaluation at the emergency room immediately or contact your primary medical clinic to discuss your concerns.   You must understand that you have received an Urgent Care treatment only and that you may be released before all of your medical problems are known or treated. You, the patient, will arrange for follow up care as instructed.     RED FLAGS/WARNING SYMPTOMS DISCUSSED WITH PATIENT THAT WOULD WARRANT EMERGENT MEDICAL ATTENTION. PATIENT VERBALIZED UNDERSTANDING.                           Sanjiv

## 2022-01-03 LAB
A ALTERNATA IGE QN: <0.1 KUA/L
A FUMIGATUS IGE QN: <0.1 KUA/L
AMER BEECH IGE QN: 0
BOXELDER IGE QN: <0.1 KUA/L
C HERBARUM IGE QN: <0.1 KUA/L
C LUNATA IGE QN: <0.1 KUA/L
CAT DANDER IGE QN: <0.1 KUA/L
CMN PIGWEED IGE QN: <0.1 KUA/L
COCKLEBUR IGE QN: 0.58 KUA/L
COCKSFOOT IGE QN: <0.1 KUA/L
COMMON RAGWEED IGE QN: 2.92 KUA/L
COTTONWOOD IGE QN: <0.1 KUA/L
D FARINAE IGE QN: <0.1 KUA/L
D PTERONYSS IGE QN: <0.1 KUA/L
DEPRECATED A ALTERNATA IGE RAST QL: 0
DEPRECATED A FUMIGATUS IGE RAST QL: 0
DEPRECATED A PULLULANS IGE RAST QL: 0
DEPRECATED AMER BEECH IGE RAST QL: <0.1 KUA/L
DEPRECATED BOXELDER IGE RAST QL: 0
DEPRECATED C HERBARUM IGE RAST QL: 0
DEPRECATED C LUNATA IGE RAST QL: 0
DEPRECATED CAT DANDER IGE RAST QL: 0
DEPRECATED COCKLEBUR IGE RAST QL: 1
DEPRECATED COCKSFOOT IGE RAST QL: 0
DEPRECATED COMMON PIGWEED IGE RAST QL: 0
DEPRECATED COMMON RAGWEED IGE RAST QL: 2
DEPRECATED COTTONWOOD IGE RAST QL: 0
DEPRECATED D FARINAE IGE RAST QL: 0
DEPRECATED D PTERONYSS IGE RAST QL: 0
DEPRECATED DOG DANDER IGE RAST QL: 0
DEPRECATED ENGL PLANTAIN IGE RAST QL: 0
DEPRECATED F MONILIFORME IGE RAST QL: 0
DEPRECATED GIANT RAGWEED IGE RAST QL: 2
DEPRECATED GOOSE FEATHER IGE RAST QL: 0
DEPRECATED GOOSEFOOT IGE RAST QL: 0
DEPRECATED KENT BLUE GRASS IGE RAST QL: 0
DEPRECATED LONDON PLANE IGE RAST QL: 0
DEPRECATED LTX IGE RAST QL: 0
DEPRECATED M RACEMOSUS IGE RAST QL: 0
DEPRECATED MUGWORT IGE RAST QL: 1
DEPRECATED P NOTATUM IGE RAST QL: 0
DEPRECATED PINEAPPLE IGE RAST QL: 0
DEPRECATED R NIGRICANS IGE RAST QL: 0
DEPRECATED RED CEDAR IGE RAST QL: 0
DEPRECATED RED TOP GRASS IGE RAST QL: 0
DEPRECATED ROACH IGE RAST QL: 0
DEPRECATED RYE IGE RAST QL: 0
DEPRECATED SALTWORT IGE RAST QL: 0
DEPRECATED SILVER BIRCH IGE RAST QL: 0
DEPRECATED TIMOTHY IGE RAST QL: 0
DEPRECATED WHITE ASH IGE RAST QL: 0
DEPRECATED WHITE HICKORY IGE RAST QL: 0
DEPRECATED WHITE OAK IGE RAST QL: 0
DOG DANDER IGE QN: <0.1 KUA/L
ENGL PLANTAIN IGE QN: <0.1 KUA/L
F MONILIFORME IGE QN: <0.1 KUA/L
GIANT RAGWEED IGE QN: 0.9 KUA/L
GOOSE FEATHER IGE QN: <0.1 KUA/L
GOOSEFOOT IGE QN: <0.1 KUA/L
GRAY ALDER (T2) CLASS: 0
GRAY ALDER (T2) CONC: <0.1 KUA/L
KENT BLUE GRASS IGE QN: <0.1 KUA/L
LONDON PLANE IGE QN: <0.1 KUA/L
LTX IGE QN: <0.1 KUA/L
M RACEMOSUS IGE QN: <0.1 KUA/L
MOLD (AUREOBASIDIUM M12) CONC: <0.1 KUA/L
MUGWORT IGE QN: 0.58 KUA/L
MULBERRY (T70) CLASS: 0
MULBERRY (T70) CONC: <0.1 KUA/L
P NOTATUM IGE QN: <0.1 KUA/L
PINEAPPLE IGE QN: <0.1 KUA/L
R NIGRICANS IGE QN: <0.1 KUA/L
RED CEDAR IGE QN: <0.1 KUA/L
RED TOP GRASS IGE QN: <0.1 KUA/L
ROACH IGE QN: <0.1 KUA/L
RYE IGE QN: <0.1 KUA/L
SALTWORT IGE QN: <0.1 KUA/L
SILVER BIRCH IGE QN: <0.1 KUA/L
TIMOTHY IGE QN: <0.1 KUA/L
WHITE ASH IGE QN: <0.1 KUA/L
WHITE ELM IGE QN: 0
WHITE ELM IGE QN: <0.1 KUA/L
WHITE HICKORY IGE QN: <0.1 KUA/L
WHITE OAK IGE QN: <0.1 KUA/L

## 2022-01-18 ENCOUNTER — APPOINTMENT (OUTPATIENT)
Dept: NEUROLOGY | Facility: CLINIC | Age: 37
End: 2022-01-18

## 2022-02-08 ENCOUNTER — APPOINTMENT (OUTPATIENT)
Dept: NEUROLOGY | Facility: CLINIC | Age: 37
End: 2022-02-08
Payer: MEDICARE

## 2022-02-08 VITALS
OXYGEN SATURATION: 98 % | DIASTOLIC BLOOD PRESSURE: 73 MMHG | SYSTOLIC BLOOD PRESSURE: 103 MMHG | TEMPERATURE: 209.48 F | RESPIRATION RATE: 97 BRPM | HEIGHT: 68 IN | WEIGHT: 265 LBS | BODY MASS INDEX: 40.16 KG/M2

## 2022-02-08 PROCEDURE — 99204 OFFICE O/P NEW MOD 45 MIN: CPT

## 2022-02-11 ENCOUNTER — RX RENEWAL (OUTPATIENT)
Age: 37
End: 2022-02-11

## 2022-02-16 LAB
ALBUMIN SERPL ELPH-MCNC: 4.5 G/DL
ALP BLD-CCNC: 56 U/L
ALT SERPL-CCNC: 9 U/L
ANION GAP SERPL CALC-SCNC: 15 MMOL/L
AST SERPL-CCNC: 9 U/L
BASOPHILS # BLD AUTO: 0.01 K/UL
BASOPHILS NFR BLD AUTO: 0.2 %
BILIRUB SERPL-MCNC: 0.2 MG/DL
BUN SERPL-MCNC: 11 MG/DL
CALCIUM SERPL-MCNC: 9.3 MG/DL
CHLORIDE SERPL-SCNC: 108 MMOL/L
CO2 SERPL-SCNC: 20 MMOL/L
CREAT SERPL-MCNC: 0.75 MG/DL
EOSINOPHIL # BLD AUTO: 0.01 K/UL
EOSINOPHIL NFR BLD AUTO: 0.2 %
GLUCOSE SERPL-MCNC: 72 MG/DL
HCT VFR BLD CALC: 35.5 %
HGB BLD-MCNC: 11.8 G/DL
IMM GRANULOCYTES NFR BLD AUTO: 0.4 %
LYMPHOCYTES # BLD AUTO: 1.59 K/UL
LYMPHOCYTES NFR BLD AUTO: 31.9 %
MAN DIFF?: NORMAL
MCHC RBC-ENTMCNC: 31.8 PG
MCHC RBC-ENTMCNC: 33.2 GM/DL
MCV RBC AUTO: 95.7 FL
MONOCYTES # BLD AUTO: 0.46 K/UL
MONOCYTES NFR BLD AUTO: 9.2 %
NEUTROPHILS # BLD AUTO: 2.9 K/UL
NEUTROPHILS NFR BLD AUTO: 58.1 %
PLATELET # BLD AUTO: 163 K/UL
POTASSIUM SERPL-SCNC: 4.6 MMOL/L
PROT SERPL-MCNC: 6.7 G/DL
RBC # BLD: 3.71 M/UL
RBC # FLD: 15.9 %
SODIUM SERPL-SCNC: 142 MMOL/L
TOPIRAMATE SERPL-MCNC: 6 MCG/ML
VALPROATE SERPL-MCNC: 86 UG/ML
WBC # FLD AUTO: 4.99 K/UL
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
## 2022-02-16 NOTE — ASSESSMENT
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  More recently well controlled on Tpx and VPA.  Having ?auras vs anxiety characterized by abdominal fluttering.\par - cont present meds until I speak with psychiatrist- not clear whether VPA being used for mood disorder- may be contributing to weight issues\par - needs new EEG and MRI brain\par - RTC 6-8 weeks

## 2022-02-16 NOTE — PHYSICAL EXAM
[FreeTextEntry1] : Neuro alert and oriented x3\par attn conc lang nl\par NO AH\par STM 3/3\par Cn intact in detail \par Motor 5/5, mild postural tremor\par sens gr nl\par CSG F to n ok\par gait NB steady

## 2022-03-02 ENCOUNTER — APPOINTMENT (OUTPATIENT)
Dept: MRI IMAGING | Facility: CLINIC | Age: 37
End: 2022-03-02
Payer: MEDICARE

## 2022-03-02 ENCOUNTER — OUTPATIENT (OUTPATIENT)
Dept: OUTPATIENT SERVICES | Facility: HOSPITAL | Age: 37
LOS: 1 days | End: 2022-03-02
Payer: COMMERCIAL

## 2022-03-02 ENCOUNTER — RESULT REVIEW (OUTPATIENT)
Age: 37
End: 2022-03-02

## 2022-03-02 DIAGNOSIS — G40.109 LOCALIZATION-RELATED (FOCAL) (PARTIAL) SYMPTOMATIC EPILEPSY AND EPILEPTIC SYNDROMES WITH SIMPLE PARTIAL SEIZURES, NOT INTRACTABLE, WITHOUT STATUS EPILEPTICUS: ICD-10-CM

## 2022-03-02 PROCEDURE — 70551 MRI BRAIN STEM W/O DYE: CPT | Mod: 26

## 2022-03-02 PROCEDURE — 70551 MRI BRAIN STEM W/O DYE: CPT

## 2022-03-03 ENCOUNTER — EMERGENCY (EMERGENCY)
Facility: HOSPITAL | Age: 37
LOS: 0 days | Discharge: HOME | End: 2022-03-04
Attending: EMERGENCY MEDICINE
Payer: MEDICARE

## 2022-03-03 ENCOUNTER — APPOINTMENT (OUTPATIENT)
Dept: NEUROLOGY | Facility: CLINIC | Age: 37
End: 2022-03-03
Payer: MEDICARE

## 2022-03-03 VITALS
OXYGEN SATURATION: 100 % | TEMPERATURE: 98 F | HEART RATE: 94 BPM | WEIGHT: 261.03 LBS | RESPIRATION RATE: 18 BRPM | SYSTOLIC BLOOD PRESSURE: 106 MMHG | DIASTOLIC BLOOD PRESSURE: 74 MMHG | HEIGHT: 68 IN

## 2022-03-03 DIAGNOSIS — R11.2 NAUSEA WITH VOMITING, UNSPECIFIED: ICD-10-CM

## 2022-03-03 DIAGNOSIS — Z88.0 ALLERGY STATUS TO PENICILLIN: ICD-10-CM

## 2022-03-03 DIAGNOSIS — D68.51 ACTIVATED PROTEIN C RESISTANCE: ICD-10-CM

## 2022-03-03 DIAGNOSIS — Z88.5 ALLERGY STATUS TO NARCOTIC AGENT: ICD-10-CM

## 2022-03-03 DIAGNOSIS — J45.909 UNSPECIFIED ASTHMA, UNCOMPLICATED: ICD-10-CM

## 2022-03-03 DIAGNOSIS — R51.9 HEADACHE, UNSPECIFIED: ICD-10-CM

## 2022-03-03 DIAGNOSIS — Z91.040 LATEX ALLERGY STATUS: ICD-10-CM

## 2022-03-03 DIAGNOSIS — Z86.69 PERSONAL HISTORY OF OTHER DISEASES OF THE NERVOUS SYSTEM AND SENSE ORGANS: ICD-10-CM

## 2022-03-03 DIAGNOSIS — R42 DIZZINESS AND GIDDINESS: ICD-10-CM

## 2022-03-03 DIAGNOSIS — Z88.8 ALLERGY STATUS TO OTHER DRUGS, MEDICAMENTS AND BIOLOGICAL SUBSTANCES STATUS: ICD-10-CM

## 2022-03-03 DIAGNOSIS — R53.1 WEAKNESS: ICD-10-CM

## 2022-03-03 DIAGNOSIS — Z20.822 CONTACT WITH AND (SUSPECTED) EXPOSURE TO COVID-19: ICD-10-CM

## 2022-03-03 DIAGNOSIS — Z88.6 ALLERGY STATUS TO ANALGESIC AGENT: ICD-10-CM

## 2022-03-03 DIAGNOSIS — Z91.018 ALLERGY TO OTHER FOODS: ICD-10-CM

## 2022-03-03 DIAGNOSIS — R10.9 UNSPECIFIED ABDOMINAL PAIN: ICD-10-CM

## 2022-03-03 LAB
ALBUMIN SERPL ELPH-MCNC: 3.3 G/DL — SIGNIFICANT CHANGE UP (ref 3.3–5)
ALP SERPL-CCNC: 54 U/L — SIGNIFICANT CHANGE UP (ref 40–120)
ALT FLD-CCNC: 27 U/L — SIGNIFICANT CHANGE UP (ref 12–78)
ANION GAP SERPL CALC-SCNC: 4 MMOL/L — LOW (ref 5–17)
APPEARANCE UR: ABNORMAL
AST SERPL-CCNC: 16 U/L — SIGNIFICANT CHANGE UP (ref 15–37)
BACTERIA # UR AUTO: ABNORMAL
BASOPHILS # BLD AUTO: 0.01 K/UL — SIGNIFICANT CHANGE UP (ref 0–0.2)
BASOPHILS NFR BLD AUTO: 0.2 % — SIGNIFICANT CHANGE UP (ref 0–2)
BILIRUB SERPL-MCNC: 0.2 MG/DL — SIGNIFICANT CHANGE UP (ref 0.2–1.2)
BILIRUB UR-MCNC: NEGATIVE — SIGNIFICANT CHANGE UP
BUN SERPL-MCNC: 15 MG/DL — SIGNIFICANT CHANGE UP (ref 7–23)
CALCIUM SERPL-MCNC: 8.8 MG/DL — SIGNIFICANT CHANGE UP (ref 8.5–10.1)
CHLORIDE SERPL-SCNC: 113 MMOL/L — HIGH (ref 96–108)
CO2 SERPL-SCNC: 25 MMOL/L — SIGNIFICANT CHANGE UP (ref 22–31)
COLOR SPEC: ABNORMAL
CREAT SERPL-MCNC: 0.71 MG/DL — SIGNIFICANT CHANGE UP (ref 0.5–1.3)
DIFF PNL FLD: ABNORMAL
EGFR: 114 ML/MIN/1.73M2 — SIGNIFICANT CHANGE UP
EOSINOPHIL # BLD AUTO: 0.03 K/UL — SIGNIFICANT CHANGE UP (ref 0–0.5)
EOSINOPHIL NFR BLD AUTO: 0.5 % — SIGNIFICANT CHANGE UP (ref 0–6)
FLUAV AG NPH QL: SIGNIFICANT CHANGE UP
FLUBV AG NPH QL: SIGNIFICANT CHANGE UP
GLUCOSE SERPL-MCNC: 84 MG/DL — SIGNIFICANT CHANGE UP (ref 70–99)
GLUCOSE UR QL: NEGATIVE MG/DL — SIGNIFICANT CHANGE UP
HCG SERPL-ACNC: <1 MIU/ML — SIGNIFICANT CHANGE UP
HCT VFR BLD CALC: 35.6 % — SIGNIFICANT CHANGE UP (ref 34.5–45)
HGB BLD-MCNC: 11.4 G/DL — LOW (ref 11.5–15.5)
IMM GRANULOCYTES NFR BLD AUTO: 0.2 % — SIGNIFICANT CHANGE UP (ref 0–1.5)
KETONES UR-MCNC: ABNORMAL
LEUKOCYTE ESTERASE UR-ACNC: ABNORMAL
LIDOCAIN IGE QN: 97 U/L — SIGNIFICANT CHANGE UP (ref 73–393)
LYMPHOCYTES # BLD AUTO: 2.31 K/UL — SIGNIFICANT CHANGE UP (ref 1–3.3)
LYMPHOCYTES # BLD AUTO: 37.3 % — SIGNIFICANT CHANGE UP (ref 13–44)
MCHC RBC-ENTMCNC: 30.2 PG — SIGNIFICANT CHANGE UP (ref 27–34)
MCHC RBC-ENTMCNC: 32 G/DL — SIGNIFICANT CHANGE UP (ref 32–36)
MCV RBC AUTO: 94.4 FL — SIGNIFICANT CHANGE UP (ref 80–100)
MONOCYTES # BLD AUTO: 0.53 K/UL — SIGNIFICANT CHANGE UP (ref 0–0.9)
MONOCYTES NFR BLD AUTO: 8.5 % — SIGNIFICANT CHANGE UP (ref 2–14)
NEUTROPHILS # BLD AUTO: 3.31 K/UL — SIGNIFICANT CHANGE UP (ref 1.8–7.4)
NEUTROPHILS NFR BLD AUTO: 53.3 % — SIGNIFICANT CHANGE UP (ref 43–77)
NITRITE UR-MCNC: NEGATIVE — SIGNIFICANT CHANGE UP
NRBC # BLD: 0 /100 WBCS — SIGNIFICANT CHANGE UP (ref 0–0)
PH UR: 6 — SIGNIFICANT CHANGE UP (ref 5–8)
PLATELET # BLD AUTO: 124 K/UL — LOW (ref 150–400)
POTASSIUM SERPL-MCNC: 3.9 MMOL/L — SIGNIFICANT CHANGE UP (ref 3.5–5.3)
POTASSIUM SERPL-SCNC: 3.9 MMOL/L — SIGNIFICANT CHANGE UP (ref 3.5–5.3)
PROT SERPL-MCNC: 7.1 GM/DL — SIGNIFICANT CHANGE UP (ref 6–8.3)
PROT UR-MCNC: 100 MG/DL
RBC # BLD: 3.77 M/UL — LOW (ref 3.8–5.2)
RBC # FLD: 15.8 % — HIGH (ref 10.3–14.5)
RBC CASTS # UR COMP ASSIST: >50 /HPF (ref 0–4)
SARS-COV-2 RNA SPEC QL NAA+PROBE: SIGNIFICANT CHANGE UP
SODIUM SERPL-SCNC: 142 MMOL/L — SIGNIFICANT CHANGE UP (ref 135–145)
SP GR SPEC: 1.02 — SIGNIFICANT CHANGE UP (ref 1.01–1.02)
UROBILINOGEN FLD QL: 1 MG/DL
VALPROATE SERPL-MCNC: 68 UG/ML — SIGNIFICANT CHANGE UP (ref 50–100)
WBC # BLD: 6.2 K/UL — SIGNIFICANT CHANGE UP (ref 3.8–10.5)
WBC # FLD AUTO: 6.2 K/UL — SIGNIFICANT CHANGE UP (ref 3.8–10.5)

## 2022-03-03 PROCEDURE — 95816 EEG AWAKE AND DROWSY: CPT

## 2022-03-03 PROCEDURE — 71045 X-RAY EXAM CHEST 1 VIEW: CPT | Mod: 26

## 2022-03-03 PROCEDURE — 74177 CT ABD & PELVIS W/CONTRAST: CPT | Mod: 26,MA

## 2022-03-03 PROCEDURE — 70450 CT HEAD/BRAIN W/O DYE: CPT | Mod: 26,MA

## 2022-03-03 PROCEDURE — 99285 EMERGENCY DEPT VISIT HI MDM: CPT

## 2022-03-03 RX ORDER — METOCLOPRAMIDE HCL 10 MG
1 TABLET ORAL
Qty: 6 | Refills: 0
Start: 2022-03-03 | End: 2022-03-05

## 2022-03-03 RX ORDER — DIPHENHYDRAMINE HCL 50 MG
25 CAPSULE ORAL ONCE
Refills: 0 | Status: COMPLETED | OUTPATIENT
Start: 2022-03-03 | End: 2022-03-03

## 2022-03-03 RX ORDER — METOCLOPRAMIDE HCL 10 MG
10 TABLET ORAL ONCE
Refills: 0 | Status: COMPLETED | OUTPATIENT
Start: 2022-03-03 | End: 2022-03-03

## 2022-03-03 RX ORDER — FLUTICASONE PROPIONATE 220 MCG
1 AEROSOL WITH ADAPTER (GRAM) INHALATION ONCE
Refills: 0 | Status: DISCONTINUED | OUTPATIENT
Start: 2022-03-03 | End: 2022-03-03

## 2022-03-03 RX ORDER — TOPIRAMATE 25 MG
25 TABLET ORAL ONCE
Refills: 0 | Status: COMPLETED | OUTPATIENT
Start: 2022-03-03 | End: 2022-03-03

## 2022-03-03 RX ORDER — NITROFURANTOIN MACROCRYSTAL 50 MG
100 CAPSULE ORAL ONCE
Refills: 0 | Status: COMPLETED | OUTPATIENT
Start: 2022-03-03 | End: 2022-03-03

## 2022-03-03 RX ORDER — DIVALPROEX SODIUM 500 MG/1
500 TABLET, DELAYED RELEASE ORAL ONCE
Refills: 0 | Status: COMPLETED | OUTPATIENT
Start: 2022-03-03 | End: 2022-03-03

## 2022-03-03 RX ORDER — ACETAMINOPHEN 500 MG
975 TABLET ORAL ONCE
Refills: 0 | Status: COMPLETED | OUTPATIENT
Start: 2022-03-03 | End: 2022-03-03

## 2022-03-03 RX ORDER — HALOPERIDOL DECANOATE 100 MG/ML
5 INJECTION INTRAMUSCULAR ONCE
Refills: 0 | Status: COMPLETED | OUTPATIENT
Start: 2022-03-03 | End: 2022-03-03

## 2022-03-03 RX ORDER — QUETIAPINE FUMARATE 200 MG/1
50 TABLET, FILM COATED ORAL ONCE
Refills: 0 | Status: COMPLETED | OUTPATIENT
Start: 2022-03-03 | End: 2022-03-03

## 2022-03-03 RX ADMIN — Medication 25 MILLIGRAM(S): at 22:30

## 2022-03-03 RX ADMIN — Medication 10 MILLIGRAM(S): at 19:15

## 2022-03-03 RX ADMIN — QUETIAPINE FUMARATE 50 MILLIGRAM(S): 200 TABLET, FILM COATED ORAL at 20:58

## 2022-03-03 RX ADMIN — Medication 975 MILLIGRAM(S): at 19:13

## 2022-03-03 RX ADMIN — Medication 100 MILLIGRAM(S): at 23:43

## 2022-03-03 RX ADMIN — Medication 975 MILLIGRAM(S): at 22:30

## 2022-03-03 RX ADMIN — DIVALPROEX SODIUM 500 MILLIGRAM(S): 500 TABLET, DELAYED RELEASE ORAL at 20:58

## 2022-03-03 RX ADMIN — Medication 10 MILLIGRAM(S): at 23:43

## 2022-03-03 RX ADMIN — HALOPERIDOL DECANOATE 5 MILLIGRAM(S): 100 INJECTION INTRAMUSCULAR at 22:43

## 2022-03-03 RX ADMIN — Medication 25 MILLIGRAM(S): at 20:57

## 2022-03-03 NOTE — ED PROVIDER NOTE - NONTENDER LOCATION
left upper quadrant/right upper quadrant/left lower quadrant/right lower quadrant/periumbilical/umbilical/left costovertebral angle/right costovertebral angle

## 2022-03-03 NOTE — ED PROVIDER NOTE - OBJECTIVE STATEMENT
36 years old female by ems c/o generalized weakness abd pain hematuria for 4 days. Pt also c/o left side headache dizziness vomited with small right red blood clot x 1 for 3 days. Pt was seen, treated and discharged from [facility] yesterday. Pt has a hx of seizure currently takes Depakote 500 mg. Pt last seizure was last year. Pt denies recent hx of trauma, blurred visions, light sensitivities, focal/distal weakness or numbness, neck/back/calfs pain, difficulty of walking or keeping balance, cough, sob, chest pain, fever, chills, dysuria, vaginal spotting or discharge or abnormal stool color or irregular bowel movements. Pt also denies harmful thoughts to herself or others, or auditory/visual hallucinations.

## 2022-03-03 NOTE — ED PROVIDER NOTE - MUSCULOSKELETAL, MLM
Spine appears normal, range of motion is not limited, no muscle or joint tenderness Non tender to palp bilateral legs

## 2022-03-03 NOTE — ED ADULT NURSE NOTE - CHIEF COMPLAINT QUOTE
pt c/o weakness, dizziness, hematuria x 4 days. pt also c/o abdominal pain and headache. denies burning urination. pt seen at [facility] yesterday for same symptoms and discharged. hx: asthma, bipolar/ explosive disorder, seizure, substance abuse, endometriosis

## 2022-03-03 NOTE — ED PROVIDER NOTE - PATIENT PORTAL LINK FT
You can access the FollowMyHealth Patient Portal offered by [facility] by registering at the following website: [email or web address]. By joining [employer]’s FollowMyHealth portal, you will also be able to view your health information using other applications (apps) compatible with our system.

## 2022-03-03 NOTE — ED PROVIDER NOTE - CONSTITUTIONAL, MLM
normal... Well appearing, awake, alert, oriented to person, place, time/situation and in no apparent distress. Speaking in clear full sentences no nasal flaring no shoulders retractions no diaphoresis, not holding her head/chest/abdomen, appears very comfortable sitting up in the stretcher in a bright light hallway

## 2022-03-03 NOTE — ED ADULT NURSE NOTE - OBJECTIVE STATEMENT
Pt received in bed alert and oriented with the c/o weakness, dizziness, hematuria x 4 days Pt states that she was RX antibiotics but its not working. Pt stable and resting in bed. Nursing care ongoing and safety maintained.

## 2022-03-03 NOTE — ED ADULT TRIAGE NOTE - CHIEF COMPLAINT QUOTE
pt c/o weakness, dizziness, hematuria x 4 days. pt also c/o abdominal pain and headache. denies burning urination. pt seen at [facility] yesterday for same symptoms and discharged. hx: asthma, bipolar/ explosive disorder, seizure, substance abuse, endometriosis

## 2022-03-03 NOTE — ED PROVIDER NOTE - PROGRESS NOTE DETAILS
pt signed out to me from dr suh, pt presented c/o generalized weakness, headache, vomiting, dizziness and abdominal pain pt able to tolerate food, no vomiting noted pt c/o dry and itching skin, pt states she does take benadryl for her itching, moisturizing body cream also given to her pt given her home meds while in the ER

## 2022-03-05 LAB
CULTURE RESULTS: SIGNIFICANT CHANGE UP
SPECIMEN SOURCE: SIGNIFICANT CHANGE UP

## 2022-03-09 ENCOUNTER — INPATIENT (INPATIENT)
Facility: HOSPITAL | Age: 37
LOS: 1 days | Discharge: HOME | End: 2022-03-11
Attending: INTERNAL MEDICINE | Admitting: INTERNAL MEDICINE
Payer: MEDICARE

## 2022-03-09 ENCOUNTER — APPOINTMENT (OUTPATIENT)
Dept: NEUROLOGY | Facility: CLINIC | Age: 37
End: 2022-03-09

## 2022-03-09 VITALS
HEART RATE: 97 BPM | TEMPERATURE: 98 F | DIASTOLIC BLOOD PRESSURE: 76 MMHG | SYSTOLIC BLOOD PRESSURE: 109 MMHG | OXYGEN SATURATION: 100 % | WEIGHT: 259.04 LBS | RESPIRATION RATE: 16 BRPM | HEIGHT: 68 IN

## 2022-03-09 DIAGNOSIS — N83.202 UNSPECIFIED OVARIAN CYST, LEFT SIDE: ICD-10-CM

## 2022-03-09 DIAGNOSIS — Z86.718 PERSONAL HISTORY OF OTHER VENOUS THROMBOSIS AND EMBOLISM: ICD-10-CM

## 2022-03-09 DIAGNOSIS — K58.9 IRRITABLE BOWEL SYNDROME WITHOUT DIARRHEA: ICD-10-CM

## 2022-03-09 DIAGNOSIS — N80.9 ENDOMETRIOSIS, UNSPECIFIED: ICD-10-CM

## 2022-03-09 DIAGNOSIS — Z79.52 LONG TERM (CURRENT) USE OF SYSTEMIC STEROIDS: ICD-10-CM

## 2022-03-09 DIAGNOSIS — Z88.5 ALLERGY STATUS TO NARCOTIC AGENT: ICD-10-CM

## 2022-03-09 DIAGNOSIS — G40.101 LOCALIZATION-RELATED (FOCAL) (PARTIAL) SYMPTOMATIC EPILEPSY AND EPILEPTIC SYNDROMES WITH SIMPLE PARTIAL SEIZURES, NOT INTRACTABLE, WITH STATUS EPILEPTICUS: ICD-10-CM

## 2022-03-09 DIAGNOSIS — R10.9 UNSPECIFIED ABDOMINAL PAIN: ICD-10-CM

## 2022-03-09 DIAGNOSIS — D68.51 ACTIVATED PROTEIN C RESISTANCE: ICD-10-CM

## 2022-03-09 DIAGNOSIS — Z91.018 ALLERGY TO OTHER FOODS: ICD-10-CM

## 2022-03-09 DIAGNOSIS — Z86.711 PERSONAL HISTORY OF PULMONARY EMBOLISM: ICD-10-CM

## 2022-03-09 DIAGNOSIS — Z91.040 LATEX ALLERGY STATUS: ICD-10-CM

## 2022-03-09 DIAGNOSIS — F41.9 ANXIETY DISORDER, UNSPECIFIED: ICD-10-CM

## 2022-03-09 DIAGNOSIS — F79 UNSPECIFIED INTELLECTUAL DISABILITIES: ICD-10-CM

## 2022-03-09 DIAGNOSIS — F25.0 SCHIZOAFFECTIVE DISORDER, BIPOLAR TYPE: ICD-10-CM

## 2022-03-09 DIAGNOSIS — J45.909 UNSPECIFIED ASTHMA, UNCOMPLICATED: ICD-10-CM

## 2022-03-09 DIAGNOSIS — D25.1 INTRAMURAL LEIOMYOMA OF UTERUS: ICD-10-CM

## 2022-03-09 DIAGNOSIS — Z91.14 PATIENT'S OTHER NONCOMPLIANCE WITH MEDICATION REGIMEN: ICD-10-CM

## 2022-03-09 DIAGNOSIS — E27.8 OTHER SPECIFIED DISORDERS OF ADRENAL GLAND: ICD-10-CM

## 2022-03-09 DIAGNOSIS — F31.9 BIPOLAR DISORDER, UNSPECIFIED: ICD-10-CM

## 2022-03-09 DIAGNOSIS — E03.9 HYPOTHYROIDISM, UNSPECIFIED: ICD-10-CM

## 2022-03-09 DIAGNOSIS — Z88.0 ALLERGY STATUS TO PENICILLIN: ICD-10-CM

## 2022-03-09 LAB
APPEARANCE UR: ABNORMAL
BACTERIA # UR AUTO: ABNORMAL
BILIRUB UR-MCNC: NEGATIVE — SIGNIFICANT CHANGE UP
COLOR SPEC: YELLOW — SIGNIFICANT CHANGE UP
DIFF PNL FLD: ABNORMAL
EPI CELLS # UR: SIGNIFICANT CHANGE UP
GLUCOSE UR QL: NEGATIVE MG/DL — SIGNIFICANT CHANGE UP
HCG UR QL: NEGATIVE — SIGNIFICANT CHANGE UP
KETONES UR-MCNC: ABNORMAL
LEUKOCYTE ESTERASE UR-ACNC: ABNORMAL
NITRITE UR-MCNC: NEGATIVE — SIGNIFICANT CHANGE UP
PH UR: 6 — SIGNIFICANT CHANGE UP (ref 5–8)
PROT UR-MCNC: 100 MG/DL
RBC CASTS # UR COMP ASSIST: >50 /HPF (ref 0–4)
SP GR SPEC: 1.02 — SIGNIFICANT CHANGE UP (ref 1.01–1.02)
UROBILINOGEN FLD QL: 1 MG/DL
WBC UR QL: SIGNIFICANT CHANGE UP

## 2022-03-09 PROCEDURE — 99285 EMERGENCY DEPT VISIT HI MDM: CPT

## 2022-03-09 RX ORDER — MIDAZOLAM HYDROCHLORIDE 1 MG/ML
2 INJECTION, SOLUTION INTRAMUSCULAR; INTRAVENOUS ONCE
Refills: 0 | Status: DISCONTINUED | OUTPATIENT
Start: 2022-03-09 | End: 2022-03-09

## 2022-03-09 RX ORDER — MIDAZOLAM HYDROCHLORIDE 1 MG/ML
4 INJECTION, SOLUTION INTRAMUSCULAR; INTRAVENOUS ONCE
Refills: 0 | Status: DISCONTINUED | OUTPATIENT
Start: 2022-03-09 | End: 2022-03-09

## 2022-03-09 RX ORDER — QUETIAPINE FUMARATE 200 MG/1
25 TABLET, FILM COATED ORAL ONCE
Refills: 0 | Status: COMPLETED | OUTPATIENT
Start: 2022-03-09 | End: 2022-03-09

## 2022-03-09 RX ORDER — HALOPERIDOL DECANOATE 100 MG/ML
5 INJECTION INTRAMUSCULAR ONCE
Refills: 0 | Status: COMPLETED | OUTPATIENT
Start: 2022-03-09 | End: 2022-03-09

## 2022-03-09 RX ORDER — ACETAMINOPHEN 500 MG
650 TABLET ORAL ONCE
Refills: 0 | Status: COMPLETED | OUTPATIENT
Start: 2022-03-09 | End: 2022-03-09

## 2022-03-09 RX ORDER — ALPRAZOLAM 0.25 MG
0.5 TABLET ORAL ONCE
Refills: 0 | Status: DISCONTINUED | OUTPATIENT
Start: 2022-03-09 | End: 2022-03-09

## 2022-03-09 RX ORDER — OLANZAPINE 15 MG/1
10 TABLET, FILM COATED ORAL ONCE
Refills: 0 | Status: COMPLETED | OUTPATIENT
Start: 2022-03-09 | End: 2022-03-09

## 2022-03-09 RX ADMIN — HALOPERIDOL DECANOATE 5 MILLIGRAM(S): 100 INJECTION INTRAMUSCULAR at 22:24

## 2022-03-09 RX ADMIN — Medication 2 MILLIGRAM(S): at 19:05

## 2022-03-09 RX ADMIN — HALOPERIDOL DECANOATE 5 MILLIGRAM(S): 100 INJECTION INTRAMUSCULAR at 20:33

## 2022-03-09 RX ADMIN — MIDAZOLAM HYDROCHLORIDE 4 MILLIGRAM(S): 1 INJECTION, SOLUTION INTRAMUSCULAR; INTRAVENOUS at 22:24

## 2022-03-09 RX ADMIN — MIDAZOLAM HYDROCHLORIDE 2 MILLIGRAM(S): 1 INJECTION, SOLUTION INTRAMUSCULAR; INTRAVENOUS at 21:23

## 2022-03-09 NOTE — ED ADULT NURSE REASSESSMENT NOTE - NS ED NURSE REASSESS COMMENT FT1
Pt continues to be agitated. Demanding Advil pm, when medication was not given pt states " I am going to drug up on all these medications when I get home". Pt asked by writer if she has any thoughts of hurting herself and pt respond stating " I refuse to answer any of your questions". Writer, security team and 1:1 at bedside. MD Loza and nursing management notified.

## 2022-03-09 NOTE — ED ADULT NURSE REASSESSMENT NOTE - NS ED NURSE REASSESS COMMENT FT1
Pt denies any c/o at this time. SW at bedside with pt. RAVINDRA Lagos convey to pt that her issues were discussed with senior staff Yamileth at [facility] who is aware of pt's complaints and are currently working on interventions to improve and rectify safety concerns at [facility]. Pt will be  by Angelina george staff member at [facility]. Will continue to monitor. CO at bedside.

## 2022-03-09 NOTE — ED PROVIDER NOTE - PROGRESS NOTE DETAILS
Patient not in designated area of the ER.  Unable to find patient  aware  had arranged for Narda from the facility to come and  the patient  The crisis respit program staff are now stating that the current facility that she is in cannot manage her needs and believes she needs a psychiatric facility. Attempt made to call telepsych.  Psych team and attending Dr. Baldo Patino declining to take report and history on patient until a blood alcohol level is resulted.  I informed the team that my shift will soon be ending and it is best to get the details and information from the physician that has been taking care of the patient vs someone taking signout.  Team still refusing to hear the case at this time. Woo Redmond, DO: pt continuing to act up. Evaluated by telepsych (at least partially?) but is attempting to leave again. Code grey called. Will require repeat IM sedation doses to prevent elopement. patient with 2 aeizures in ED without return to baseline, 2nd seizure aborted with IM versed and iv ativan, keppra load given. CT head obtained, no acutr pathology. US discussed with gynecology, no acute intervention, can follow as outpatinet
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
## 2022-03-09 NOTE — ED PROVIDER NOTE - OBJECTIVE STATEMENT
This patient is a 36 year old woman hx of This patient is a 36 year old woman c/o continued left flank pain and hematuria.  Although triage note reports vaginal bleeding the patient denies vaginal bleeding.  She was seen in the ER 6 days ago for hematuria and had CT scan at that time with no acute findings.  Patient states that she has been to 3 other emergency rooms in the past few days and no one has been able to tell her why she has pain.  Patient states that she feels anxious and is demanding haldol and ativan.  She denies suicidal and homicidal ideations as well as hallucinations.  Patient reports that she does not want to go back to her group home because she does not like living there.

## 2022-03-09 NOTE — ED ADULT NURSE REASSESSMENT NOTE - NS ED NURSE REASSESS COMMENT FT1
Pt anxious and agitated, noted to be shouting and confrontational with ED,  . Attempting to leave ED multiple times. Pt refusing Tylenol for pain. Pt increasingly anxious and agitated, noted to be shouting and confrontational with ED staff. Pt continues to attempting to elope. Security team aware and is with pt, attempting to redirect pt back to bed. Pt increasingly anxious and agitated, noted to be shouting and confrontational with ED staff. Pt attempting to elope. Security team aware and is with pt, attempting to redirect pt back to bed.  Pt to continues to push back at staff, refusing to return to room. Pt also stating that she is on the cellphone with members from her crisis team. Pt in dialogue with members from her crisis team, pt requesting that she be picked up by one of the member of the team who verbalized that they were unable to pick he up. Will medicate pt as per order. Pt increasingly anxious and agitated, noted to be shouting and confrontational with ED staff. Pt attempting to elope. Security team aware and is with pt, attempting to redirect pt back to bed.  Pt to continues to push back at staff, refusing to return to room. Pt also stating that she is on the cellphone with members from her crisis team. Pt in dialogue with members from her crisis team continuously. Pt requesting that she be picked up by one of the members of the team who verbalized that they were unable to pick her up. Will medicate pt as per order.

## 2022-03-09 NOTE — ED PROVIDER NOTE - PATIENT PORTAL LINK FT
You can access the FollowMyHealth Patient Portal offered by [facility] by registering at the following website: [email or web address]. By joining [employer]’s FollowMyHealth portal, you will also be able to view your health information using other applications (apps) compatible with our system.

## 2022-03-09 NOTE — ED ADULT NURSE REASSESSMENT NOTE - NS ED NURSE REASSESS COMMENT FT1
Writer contacted by Sheba Muñoz (nurse) and Leslie Hernández (therapist) who identified themselves as the individuals on the phone with the pt for 2hr of interactions. Pt per Sheba they bother ar member of the Crisis and Respite team who have been involved with pt's care for 18months., Asper Sheba Muñoz, this is not the pt's demeanor this agitation is a change in baseline. Writer contacted by Sheba Muñoz (nurse) and Leslie Hernández (therapist) who identified themselves as the individuals on the phone with the pt over the phone that pt has been talking to overt the past 2hrs.  as per Sheba Muñoz, they both are members of the Crisis and Respite team who has been involved with pt's care for 18months., Asper Sheba,  this is not the pt's demeanor this level of constant agitation and the refusal to co-operate or deescalate is a change in baseline. Sheba also verbalized that pt has a significant hx of attempted SI. Sheba Muñoz (nurse) and Leslie Hernández (therapist) states that they are on call for 24hr and can be reached:  Sheba Muñoz (nurse):  [phone]   Leslie Hernández (therapist): [phone]  All information discussed with MD Loza and nursing management. 1:1 supervision continued. Writer contacted by Sheba Muñoz (nurse) and Leslie Hernández (therapist) who identified themselves as the individuals on the phone that pt has been talking to over the past 2hrs.  As per Sheba Muñoz, they both are members of the Crisis and Respite team who has been involved with pt's care for 18months., Asper Sheba,  this is not the pt's demeanor this level of constant agitation and the refusal to co-operate or deescalate is a change in baseline. Sheba also verbalized that pt has a significant hx of attempted SI. Sheba Muñoz (nurse) and Leslie Hernández (therapist) states that they are on call for 24hr and can be reached:  Sheba Muñoz (nurse):  [phone]   Leslie Hernández (therapist): [phone]  All information discussed with MD Loza and nursing management. 1:1 supervision continued. Writer contacted by Sheba Muñoz (nurse) and Leslie Hernández (therapist) who identified themselves as the individuals on the phone that pt has been talking to over the past 2hrs.  As per Sheba Muñoz, they both are members of the Crisis and Respite team who has been involved with pt's care for 18months., Asper Sheba,  this is not the pt's demeanor and  this level of constant agitation and the refusal to co-operate or deescalate is a change in baseline. Sheba also verbalized that pt has a significant hx of attempted suicide. Sheba Muñoz (nurse) and Leslie Hernández (therapist) states that they are on call for 24hr and can be reached:  Sheba Muñoz (nurse):  [phone]   Leslie Hernández (therapist): [phone]  All information discussed with MD Loza and nursing management. 1:1 supervision continued.

## 2022-03-09 NOTE — ED ADULT TRIAGE NOTE - CHIEF COMPLAINT QUOTE
as per patient c/o L sided flank pain and vaginal bleeding x 3 weeks. states LMP in 2020.  Hx: Factor 5 Leiden, Seizures, Asthma.

## 2022-03-09 NOTE — ED PROVIDER NOTE - CARE PLAN
1 Principal Discharge DX:	Agitation  Secondary Diagnosis:	Hematuria   Principal Discharge DX:	Status epilepticus  Secondary Diagnosis:	Hematuria
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
## 2022-03-09 NOTE — ED ADULT NURSE REASSESSMENT NOTE - NS ED NURSE REASSESS COMMENT FT1
Pt A&Ox4, attempting to walk out the ED, Pt refusing to be picked up by Jose Guadalupe staff member from [facility]. As per Jose Guadalupe Staff member drives with cellphone in hand while talking on the cellphone. Pt A&Ox4, attempting to walk out the ED, Pt refusing to be picked up by Jose Guadalupe staff member from [facility]. As per pt , Jose Guadalupe (group [facility] staff) video chats while driving. Writer contacted [facility] and discussed pt's concerns with senior staff member Lamar at [facility]. Writer request that another team member escort pt home.

## 2022-03-09 NOTE — ED PROVIDER NOTE - NSFOLLOWUPINSTRUCTIONS_ED_ALL_ED_FT
1) Follow-up with urology  2) Follow up with your primary care doctor  3) Return to the ER for worsening or concerning symptoms

## 2022-03-09 NOTE — ED PROVIDER NOTE - CLINICAL SUMMARY MEDICAL DECISION MAKING FREE TEXT BOX
Patient with reports of hematuria confusion about if patient also experiencing vaginal bleeding.  Patient refusing pelvic exam.  Patient recently seen and had CT with no acute pathology.  Will check UA r/o UTI.

## 2022-03-09 NOTE — ED PROVIDER NOTE - CARE PROVIDER_API CALL
Charly Tello)  Urology  [address] [place] [address]  Phone: [phone]  Fax: [phone]  Follow Up Time:

## 2022-03-09 NOTE — ED ADULT NURSE NOTE - OBJECTIVE STATEMENT
pt 37 y/o female c/c of vaginal bleed and L lower flank pain 2-3weeks. pt states went through 4 pads today due to bright red bleeding. pt from [facility]. AAOX3, pt appears drowsy and anxious, states "she is confused and is having a panic attack".  pt denies taking any pain medications. LMP 2020. pt has implant birth-control. . pt allergic to toradol, zofran, trazadone. PMH of epilepsy, asthma, Factor V Leiden.

## 2022-03-09 NOTE — ED ADULT NURSE REASSESSMENT NOTE - NS ED NURSE REASSESS COMMENT FT1
Pt A&Ox4, sitting in bed with ED tech David at bedside with pt for observation. Pt was observed crying during reports while discussing issues at group home with RAVINDRA Lagos.  Pt now, calm, changing into hospital gown. Meal given. Pt denies SI/HI to writer. CO continued. NAD noted at this time.

## 2022-03-10 DIAGNOSIS — R56.9 UNSPECIFIED CONVULSIONS: ICD-10-CM

## 2022-03-10 DIAGNOSIS — F60.9 PERSONALITY DISORDER, UNSPECIFIED: ICD-10-CM

## 2022-03-10 DIAGNOSIS — R10.31 RIGHT LOWER QUADRANT PAIN: ICD-10-CM

## 2022-03-10 DIAGNOSIS — Z86.711 PERSONAL HISTORY OF PULMONARY EMBOLISM: ICD-10-CM

## 2022-03-10 DIAGNOSIS — F31.9 BIPOLAR DISORDER, UNSPECIFIED: ICD-10-CM

## 2022-03-10 DIAGNOSIS — F39 UNSPECIFIED MOOD [AFFECTIVE] DISORDER: ICD-10-CM

## 2022-03-10 DIAGNOSIS — E03.9 HYPOTHYROIDISM, UNSPECIFIED: ICD-10-CM

## 2022-03-10 DIAGNOSIS — F79 UNSPECIFIED INTELLECTUAL DISABILITIES: ICD-10-CM

## 2022-03-10 DIAGNOSIS — D68.51 ACTIVATED PROTEIN C RESISTANCE: ICD-10-CM

## 2022-03-10 LAB
AMPHET UR-MCNC: NEGATIVE — SIGNIFICANT CHANGE UP
ANION GAP SERPL CALC-SCNC: 3 MMOL/L — LOW (ref 5–17)
APAP SERPL-MCNC: < 2 UG/ML (ref 10–30)
APPEARANCE UR: CLEAR — SIGNIFICANT CHANGE UP
BACTERIA # UR AUTO: ABNORMAL
BARBITURATES UR SCN-MCNC: NEGATIVE — SIGNIFICANT CHANGE UP
BASOPHILS # BLD AUTO: 0.01 K/UL — SIGNIFICANT CHANGE UP (ref 0–0.2)
BASOPHILS NFR BLD AUTO: 0.2 % — SIGNIFICANT CHANGE UP (ref 0–2)
BENZODIAZ UR-MCNC: POSITIVE — SIGNIFICANT CHANGE UP
BILIRUB UR-MCNC: ABNORMAL
BUN SERPL-MCNC: 14 MG/DL — SIGNIFICANT CHANGE UP (ref 7–23)
CALCIUM SERPL-MCNC: 9.1 MG/DL — SIGNIFICANT CHANGE UP (ref 8.5–10.1)
CHLORIDE SERPL-SCNC: 112 MMOL/L — HIGH (ref 96–108)
CO2 SERPL-SCNC: 29 MMOL/L — SIGNIFICANT CHANGE UP (ref 22–31)
COCAINE METAB.OTHER UR-MCNC: NEGATIVE — SIGNIFICANT CHANGE UP
COLOR SPEC: YELLOW — SIGNIFICANT CHANGE UP
COVID-19 SPIKE DOMAIN AB INTERP: POSITIVE
COVID-19 SPIKE DOMAIN ANTIBODY RESULT: >250 U/ML — HIGH
CREAT SERPL-MCNC: 1.02 MG/DL — SIGNIFICANT CHANGE UP (ref 0.5–1.3)
DIFF PNL FLD: ABNORMAL
EGFR: 74 ML/MIN/1.73M2 — SIGNIFICANT CHANGE UP
EOSINOPHIL # BLD AUTO: 0.04 K/UL — SIGNIFICANT CHANGE UP (ref 0–0.5)
EOSINOPHIL NFR BLD AUTO: 0.7 % — SIGNIFICANT CHANGE UP (ref 0–6)
EPI CELLS # UR: ABNORMAL
ETHANOL SERPL-MCNC: <10 MG/DL — SIGNIFICANT CHANGE UP (ref 0–10)
FLUAV AG NPH QL: SIGNIFICANT CHANGE UP
FLUBV AG NPH QL: SIGNIFICANT CHANGE UP
GLUCOSE BLDC GLUCOMTR-MCNC: 101 MG/DL — HIGH (ref 70–99)
GLUCOSE SERPL-MCNC: 108 MG/DL — HIGH (ref 70–99)
GLUCOSE UR QL: NEGATIVE MG/DL — SIGNIFICANT CHANGE UP
HCG SERPL-ACNC: <1 MIU/ML — SIGNIFICANT CHANGE UP
HCT VFR BLD CALC: 37.1 % — SIGNIFICANT CHANGE UP (ref 34.5–45)
HGB BLD-MCNC: 11.9 G/DL — SIGNIFICANT CHANGE UP (ref 11.5–15.5)
IMM GRANULOCYTES NFR BLD AUTO: 0.2 % — SIGNIFICANT CHANGE UP (ref 0–1.5)
KETONES UR-MCNC: ABNORMAL
LEUKOCYTE ESTERASE UR-ACNC: ABNORMAL
LYMPHOCYTES # BLD AUTO: 1.75 K/UL — SIGNIFICANT CHANGE UP (ref 1–3.3)
LYMPHOCYTES # BLD AUTO: 28.5 % — SIGNIFICANT CHANGE UP (ref 13–44)
MCHC RBC-ENTMCNC: 30.1 PG — SIGNIFICANT CHANGE UP (ref 27–34)
MCHC RBC-ENTMCNC: 32.1 G/DL — SIGNIFICANT CHANGE UP (ref 32–36)
MCV RBC AUTO: 93.9 FL — SIGNIFICANT CHANGE UP (ref 80–100)
METHADONE UR-MCNC: NEGATIVE — SIGNIFICANT CHANGE UP
MONOCYTES # BLD AUTO: 0.34 K/UL — SIGNIFICANT CHANGE UP (ref 0–0.9)
MONOCYTES NFR BLD AUTO: 5.5 % — SIGNIFICANT CHANGE UP (ref 2–14)
NEUTROPHILS # BLD AUTO: 4 K/UL — SIGNIFICANT CHANGE UP (ref 1.8–7.4)
NEUTROPHILS NFR BLD AUTO: 64.9 % — SIGNIFICANT CHANGE UP (ref 43–77)
NITRITE UR-MCNC: NEGATIVE — SIGNIFICANT CHANGE UP
NRBC # BLD: 0 /100 WBCS — SIGNIFICANT CHANGE UP (ref 0–0)
OPIATES UR-MCNC: NEGATIVE — SIGNIFICANT CHANGE UP
PCP SPEC-MCNC: SIGNIFICANT CHANGE UP
PCP UR-MCNC: NEGATIVE — SIGNIFICANT CHANGE UP
PH UR: 6 — SIGNIFICANT CHANGE UP (ref 5–8)
PLATELET # BLD AUTO: 145 K/UL — LOW (ref 150–400)
POTASSIUM SERPL-MCNC: 3.9 MMOL/L — SIGNIFICANT CHANGE UP (ref 3.5–5.3)
POTASSIUM SERPL-SCNC: 3.9 MMOL/L — SIGNIFICANT CHANGE UP (ref 3.5–5.3)
PROT UR-MCNC: 30 MG/DL
RBC # BLD: 3.95 M/UL — SIGNIFICANT CHANGE UP (ref 3.8–5.2)
RBC # FLD: 15.8 % — HIGH (ref 10.3–14.5)
RBC CASTS # UR COMP ASSIST: >50 /HPF (ref 0–4)
SALICYLATES SERPL-MCNC: <1.7 MG/DL — LOW (ref 2.8–20)
SARS-COV-2 IGG+IGM SERPL QL IA: >250 U/ML — HIGH
SARS-COV-2 IGG+IGM SERPL QL IA: POSITIVE
SARS-COV-2 RNA SPEC QL NAA+PROBE: SIGNIFICANT CHANGE UP
SODIUM SERPL-SCNC: 144 MMOL/L — SIGNIFICANT CHANGE UP (ref 135–145)
SP GR SPEC: 1.02 — SIGNIFICANT CHANGE UP (ref 1.01–1.02)
THC UR QL: NEGATIVE — SIGNIFICANT CHANGE UP
UROBILINOGEN FLD QL: 8 MG/DL
VALPROATE SERPL-MCNC: 25 UG/ML — LOW (ref 50–100)
WBC # BLD: 6.15 K/UL — SIGNIFICANT CHANGE UP (ref 3.8–10.5)
WBC # FLD AUTO: 6.15 K/UL — SIGNIFICANT CHANGE UP (ref 3.8–10.5)
WBC UR QL: SIGNIFICANT CHANGE UP

## 2022-03-10 PROCEDURE — 93010 ELECTROCARDIOGRAM REPORT: CPT | Mod: 76

## 2022-03-10 PROCEDURE — 76856 US EXAM PELVIC COMPLETE: CPT | Mod: 26

## 2022-03-10 PROCEDURE — 90792 PSYCH DIAG EVAL W/MED SRVCS: CPT | Mod: 95

## 2022-03-10 PROCEDURE — 74176 CT ABD & PELVIS W/O CONTRAST: CPT | Mod: 26,MA

## 2022-03-10 PROCEDURE — 70450 CT HEAD/BRAIN W/O DYE: CPT | Mod: 26,MG

## 2022-03-10 PROCEDURE — 99223 1ST HOSP IP/OBS HIGH 75: CPT

## 2022-03-10 PROCEDURE — G1004: CPT

## 2022-03-10 PROCEDURE — 99232 SBSQ HOSP IP/OBS MODERATE 35: CPT

## 2022-03-10 RX ORDER — PANTOPRAZOLE SODIUM 20 MG/1
40 TABLET, DELAYED RELEASE ORAL
Refills: 0 | Status: DISCONTINUED | OUTPATIENT
Start: 2022-03-10 | End: 2022-03-11

## 2022-03-10 RX ORDER — HALOPERIDOL DECANOATE 100 MG/ML
2.5 INJECTION INTRAMUSCULAR ONCE
Refills: 0 | Status: COMPLETED | OUTPATIENT
Start: 2022-03-10 | End: 2022-03-10

## 2022-03-10 RX ORDER — ACETAMINOPHEN 500 MG
650 TABLET ORAL EVERY 6 HOURS
Refills: 0 | Status: DISCONTINUED | OUTPATIENT
Start: 2022-03-10 | End: 2022-03-11

## 2022-03-10 RX ORDER — ARIPIPRAZOLE 15 MG/1
30 TABLET ORAL DAILY
Refills: 0 | Status: DISCONTINUED | OUTPATIENT
Start: 2022-03-10 | End: 2022-03-11

## 2022-03-10 RX ORDER — HALOPERIDOL DECANOATE 100 MG/ML
5 INJECTION INTRAMUSCULAR ONCE
Refills: 0 | Status: DISCONTINUED | OUTPATIENT
Start: 2022-03-10 | End: 2022-03-10

## 2022-03-10 RX ORDER — MIDAZOLAM HYDROCHLORIDE 1 MG/ML
4 INJECTION, SOLUTION INTRAMUSCULAR; INTRAVENOUS ONCE
Refills: 0 | Status: DISCONTINUED | OUTPATIENT
Start: 2022-03-10 | End: 2022-03-10

## 2022-03-10 RX ORDER — DIVALPROEX SODIUM 500 MG/1
500 TABLET, DELAYED RELEASE ORAL
Refills: 0 | Status: DISCONTINUED | OUTPATIENT
Start: 2022-03-11 | End: 2022-03-11

## 2022-03-10 RX ORDER — VALPROIC ACID (AS SODIUM SALT) 250 MG/5ML
1000 SOLUTION, ORAL ORAL ONCE
Refills: 0 | Status: COMPLETED | OUTPATIENT
Start: 2022-03-10 | End: 2022-03-10

## 2022-03-10 RX ORDER — PROCHLORPERAZINE MALEATE 5 MG
10 TABLET ORAL ONCE
Refills: 0 | Status: DISCONTINUED | OUTPATIENT
Start: 2022-03-10 | End: 2022-03-10

## 2022-03-10 RX ORDER — NALTREXONE HYDROCHLORIDE 50 MG/1
50 TABLET, FILM COATED ORAL DAILY
Refills: 0 | Status: DISCONTINUED | OUTPATIENT
Start: 2022-03-10 | End: 2022-03-11

## 2022-03-10 RX ORDER — LEVOTHYROXINE SODIUM 125 MCG
75 TABLET ORAL DAILY
Refills: 0 | Status: DISCONTINUED | OUTPATIENT
Start: 2022-03-10 | End: 2022-03-11

## 2022-03-10 RX ORDER — LEVETIRACETAM 250 MG/1
1000 TABLET, FILM COATED ORAL ONCE
Refills: 0 | Status: COMPLETED | OUTPATIENT
Start: 2022-03-10 | End: 2022-03-10

## 2022-03-10 RX ORDER — HALOPERIDOL DECANOATE 100 MG/ML
5 INJECTION INTRAMUSCULAR ONCE
Refills: 0 | Status: COMPLETED | OUTPATIENT
Start: 2022-03-10 | End: 2022-03-10

## 2022-03-10 RX ORDER — LURASIDONE HYDROCHLORIDE 40 MG/1
40 TABLET ORAL DAILY
Refills: 0 | Status: DISCONTINUED | OUTPATIENT
Start: 2022-03-10 | End: 2022-03-10

## 2022-03-10 RX ORDER — LURASIDONE HYDROCHLORIDE 40 MG/1
40 TABLET ORAL DAILY
Refills: 0 | Status: DISCONTINUED | OUTPATIENT
Start: 2022-03-10 | End: 2022-03-11

## 2022-03-10 RX ORDER — ALBUTEROL 90 UG/1
2 AEROSOL, METERED ORAL EVERY 6 HOURS
Refills: 0 | Status: DISCONTINUED | OUTPATIENT
Start: 2022-03-10 | End: 2022-03-11

## 2022-03-10 RX ORDER — ARIPIPRAZOLE 15 MG/1
30 TABLET ORAL DAILY
Refills: 0 | Status: DISCONTINUED | OUTPATIENT
Start: 2022-03-10 | End: 2022-03-10

## 2022-03-10 RX ORDER — TOPIRAMATE 25 MG
25 TABLET ORAL DAILY
Refills: 0 | Status: DISCONTINUED | OUTPATIENT
Start: 2022-03-10 | End: 2022-03-11

## 2022-03-10 RX ORDER — TRAZODONE HCL 50 MG
100 TABLET ORAL AT BEDTIME
Refills: 0 | Status: DISCONTINUED | OUTPATIENT
Start: 2022-03-10 | End: 2022-03-10

## 2022-03-10 RX ORDER — MIDAZOLAM HYDROCHLORIDE 1 MG/ML
5 INJECTION, SOLUTION INTRAMUSCULAR; INTRAVENOUS ONCE
Refills: 0 | Status: DISCONTINUED | OUTPATIENT
Start: 2022-03-10 | End: 2022-03-10

## 2022-03-10 RX ORDER — MOMETASONE FUROATE 220 UG/1
1 INHALANT RESPIRATORY (INHALATION) DAILY
Refills: 0 | Status: DISCONTINUED | OUTPATIENT
Start: 2022-03-10 | End: 2022-03-11

## 2022-03-10 RX ORDER — RIVAROXABAN 15 MG-20MG
20 KIT ORAL
Refills: 0 | Status: DISCONTINUED | OUTPATIENT
Start: 2022-03-10 | End: 2022-03-11

## 2022-03-10 RX ORDER — DIPHENHYDRAMINE HCL 50 MG
50 CAPSULE ORAL EVERY 4 HOURS
Refills: 0 | Status: DISCONTINUED | OUTPATIENT
Start: 2022-03-10 | End: 2022-03-11

## 2022-03-10 RX ORDER — QUETIAPINE FUMARATE 200 MG/1
50 TABLET, FILM COATED ORAL
Refills: 0 | Status: DISCONTINUED | OUTPATIENT
Start: 2022-03-10 | End: 2022-03-11

## 2022-03-10 RX ORDER — PHENOBARBITAL 60 MG
260 TABLET ORAL ONCE
Refills: 0 | Status: DISCONTINUED | OUTPATIENT
Start: 2022-03-10 | End: 2022-03-10

## 2022-03-10 RX ORDER — NALTREXONE HYDROCHLORIDE 50 MG/1
20 TABLET, FILM COATED ORAL DAILY
Refills: 0 | Status: DISCONTINUED | OUTPATIENT
Start: 2022-03-10 | End: 2022-03-10

## 2022-03-10 RX ORDER — DIPHENHYDRAMINE HCL 50 MG
25 CAPSULE ORAL ONCE
Refills: 0 | Status: COMPLETED | OUTPATIENT
Start: 2022-03-10 | End: 2022-03-10

## 2022-03-10 RX ORDER — LANOLIN ALCOHOL/MO/W.PET/CERES
3 CREAM (GRAM) TOPICAL AT BEDTIME
Refills: 0 | Status: DISCONTINUED | OUTPATIENT
Start: 2022-03-10 | End: 2022-03-11

## 2022-03-10 RX ORDER — HALOPERIDOL DECANOATE 100 MG/ML
5 INJECTION INTRAMUSCULAR
Refills: 0 | Status: DISCONTINUED | OUTPATIENT
Start: 2022-03-10 | End: 2022-03-10

## 2022-03-10 RX ORDER — PHENOBARBITAL 60 MG
130 TABLET ORAL ONCE
Refills: 0 | Status: DISCONTINUED | OUTPATIENT
Start: 2022-03-10 | End: 2022-03-10

## 2022-03-10 RX ADMIN — MIDAZOLAM HYDROCHLORIDE 4 MILLIGRAM(S): 1 INJECTION, SOLUTION INTRAMUSCULAR; INTRAVENOUS at 03:36

## 2022-03-10 RX ADMIN — HALOPERIDOL DECANOATE 2.5 MILLIGRAM(S): 100 INJECTION INTRAMUSCULAR at 19:45

## 2022-03-10 RX ADMIN — LEVETIRACETAM 400 MILLIGRAM(S): 250 TABLET, FILM COATED ORAL at 11:22

## 2022-03-10 RX ADMIN — RIVAROXABAN 20 MILLIGRAM(S): KIT at 18:23

## 2022-03-10 RX ADMIN — MOMETASONE FUROATE 1 PUFF(S): 220 INHALANT RESPIRATORY (INHALATION) at 18:24

## 2022-03-10 RX ADMIN — QUETIAPINE FUMARATE 50 MILLIGRAM(S): 200 TABLET, FILM COATED ORAL at 18:23

## 2022-03-10 RX ADMIN — Medication 1 MILLIGRAM(S): at 03:57

## 2022-03-10 RX ADMIN — Medication 260 MILLIGRAM(S): at 21:13

## 2022-03-10 RX ADMIN — ARIPIPRAZOLE 30 MILLIGRAM(S): 15 TABLET ORAL at 18:23

## 2022-03-10 RX ADMIN — HALOPERIDOL DECANOATE 5 MILLIGRAM(S): 100 INJECTION INTRAMUSCULAR at 03:57

## 2022-03-10 RX ADMIN — Medication 25 MILLIGRAM(S): at 04:53

## 2022-03-10 RX ADMIN — Medication 2 MILLIGRAM(S): at 19:44

## 2022-03-10 RX ADMIN — Medication 130 MILLIGRAM(S): at 20:55

## 2022-03-10 RX ADMIN — Medication 2 MILLIGRAM(S): at 11:12

## 2022-03-10 RX ADMIN — MIDAZOLAM HYDROCHLORIDE 5 MILLIGRAM(S): 1 INJECTION, SOLUTION INTRAMUSCULAR; INTRAVENOUS at 11:30

## 2022-03-10 RX ADMIN — Medication 30 MILLIGRAM(S): at 18:16

## 2022-03-10 RX ADMIN — Medication 2 MILLIGRAM(S): at 23:06

## 2022-03-10 RX ADMIN — Medication 50 MILLIGRAM(S): at 19:05

## 2022-03-10 NOTE — PATIENT PROFILE ADULT - FUNCTIONAL ASSESSMENT - DAILY ACTIVITY SCORE.
Gabapentin Counseling: I discussed with the patient the risks of gabapentin including but not limited to dizziness, somnolence, fatigue and ataxia. 19

## 2022-03-10 NOTE — ED BEHAVIORAL HEALTH ASSESSMENT NOTE - NSCURPASTPSYDX_PSY_ALL_CORE
Mood disorder/Psychotic disorder/PTSD/Alcohol/Substance Use disorders/Cluster B Personality disorder/traits/Conduct problems

## 2022-03-10 NOTE — PATIENT PROFILE ADULT - HOME ACCESSIBILITY CONCERNS
NOTIFICATION RETURN TO WORK / SCHOOL 
 
4/30/2018 3:16 PM 
 
Ms. Jose Allen [address] 
[address] To Whom It May Concern: 
 
Jose Ward is currently under the care of [address] [place] [address] Guy Gonzalez. Please allow her to return to work if there is sedentary work. If not, please allow her to remain out of work until further notice. If there are questions or concerns please have the patient contact our office.  
 
 
 
Sincerely, 
 
 
Rosa Ly MD 
 
                                
 
 none

## 2022-03-10 NOTE — ED BEHAVIORAL HEALTH ASSESSMENT NOTE - REASON
further safety evaluation when pt is better engaged is indicated further safety evaluation when pt is better engaged is indicated & detailed collateral is lacking at this time
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
## 2022-03-10 NOTE — PATIENT PROFILE ADULT - FALL HARM RISK - HARM RISK INTERVENTIONS

## 2022-03-10 NOTE — ED BEHAVIORAL HEALTH NOTE - BEHAVIORAL HEALTH NOTE
============    ED COURSE    ============    SOURCE: ALEYDA Prasad    ARRIVAL: Patient presented to the emergency room complaing of vaginal bleeding and pain on her side. While in the emergency room patient presented became increasingly agitated and uncooperative.     BEHAVIOR: Patient presents as uncooperative, disruptive, easily agitated, increasingly anxious. Patient require several IM medications including Haldol, Versed, Midazolam, and PO Xanax. Patient presented as an elopement risk as she attempted to leave multiple times. Patient had requested Advil PM, when that was rejected, patient stated "that's ok, I'll just down a bottle when I get home". After patient made that statement, patient was screened for suicidality, however refused to answer.     VISITORS: Leslie Hernández ([phone]) Therapist, and Sheba Muñoz, Nurse ([phone]) from a Crisis and Respite program called the nurses station regarding patient and provided their contact information. Also, Narda ([phone]) from patient's [facility] also came to the emergency room briefly to help deescalate patient.     ========================    FOR EACH COLLATERAL    ========================    NAME: Sigrid    NUMBER: [phone]    RELATIONSHIP: Group home staff    RELIABILITY: Fair    COMMENTS: Sigrid was unable to provide information on patient's emergency room visit, however provide contact information for , Melida ([phone]), Narda, Senior staff member ([phone])
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
## 2022-03-10 NOTE — H&P ADULT - ASSESSMENT
36 years old female with h/o epilepsy, Factor V Leiden mutation with h/o DVT/PE, IBS, hypothyroidism, endometriosis, bipolar disorder present to ED with complain of left flank pain and hematuria. Per note, patient was seen in multiple ED with no cause found. Patient was anxious, agitated and demanding haldol and ativan while  in ED. While in ED, patient had 2 episode of GTC seizure, received IM ativan and Versed in ED. Received multiple dose of haldol and benzo in ED for agitation  Hemodynamically stable, afebrile, sat well at RA. EKG with NSR, VR 86, QTc 459. No leukocytosis, plt 145, K 3.9, Cr 1.02, hCG negative. Valproic acid level 25. UA  with large blood, RBC > 50. CT abd/pelvis with no hydronephrosis or obstructing stone. 4cm left adnexal cyst. Pelvis ultrasound with simple left ovarian cyst measuring 3.8cm, Trace pelvis free fluid. Small anterior uterine fibroid. Refused pelvis exam in ED

## 2022-03-10 NOTE — ED ADULT NURSE REASSESSMENT NOTE - NS ED NURSE REASSESS COMMENT FT1
pt noted to be sleeping in bed, all pt belongings labeled, itemized and given to security. 1:1 SUPERVISION PRESENT. Pt on cardiac monitoring.

## 2022-03-10 NOTE — H&P ADULT - NSHPPHYSICALEXAM_GEN_ALL_CORE
CONSTITUTIONAL: Well developed, well nourished, sedated  EYES: PERRL, no scleral icterus  ENT: Mucosa moist, tongue normal.  NECK: Neck supple, trachea midline, non-tender  CARDIAC: Normal S1 and S2. Regular rate and rhythms. No murmurs. No Pedal edema. Peripheral pulses intact  LUNGS: Clear to auscultation, equal air entry both lungs. No rales, rhonchi, wheezing. Normal respiratory effort.   ABDOMEN: Mild lower abdominal tenderness noted. No guarding or rebound tenderness. No hepatomegaly or splenomegaly.   MUSCULOSKELETAL: Normocephalic, atraumatic.  No significant deformity or joint abnormality. No varicosities.  NEUROLOGICAL: Move all extremities  SKIN: no lesions or eruptions. Normal turgor  PSYCHIATRIC:  Sedated.

## 2022-03-10 NOTE — ED BEHAVIORAL HEALTH ASSESSMENT NOTE - OTHER
Adult / Group home for people with disabilities roommate Records Checked: [facility], [facility], [facility], [facility], [facility], [facility], [facility], [facility], [facility], [facility], [facility], [facility], [facility], [facility], [facility], [facility], [facility], [facility], [facility], [facility], [facility], Social Media (For example - Facebook, [employer], [facility]), Web search, [employer] sleepy sedate Unable to assess "tired"

## 2022-03-10 NOTE — ED BEHAVIORAL HEALTH ASSESSMENT NOTE - DIFFERENTIAL
Intellectual disability with poor impulse control causing agitation  vs Exacerbated Schizoaffective disorder  vs Affective dysregulation 2/2 disorder characterology  vs other

## 2022-03-10 NOTE — ED ADULT NURSE REASSESSMENT NOTE - NS ED NURSE REASSESS COMMENT FT1
Called to bedside for patient seizing: seizure lasted total of 10minutes. Patient placed on 100% non rebreather . IV placed to Right Foot.

## 2022-03-10 NOTE — H&P ADULT - PROBLEM SELECTOR PLAN 2
Reported bilateral lower abd pain  CT abd/pelvis with no hydronephrosis or obstructing stone. 4cm left adnexal cyst. Pelvis ultrasound with simple left ovarian cyst measuring 3.8cm, Trace pelvis free fluid. Small anterior uterine fibroid  Refused pelvis exam in ED  Patient reported vaginal bleed- but no bleeding since in ED  Reported hematuria- UA does not appear to be grossly bloody. UA appearance clear  ED discussed with GYN, can be follow up as outpatient
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
## 2022-03-10 NOTE — ED BEHAVIORAL HEALTH ASSESSMENT NOTE - RISK ASSESSMENT
Unable to determine Suicide Risk Pertinent known risk and protective factors are noted in documentation above; otherwise unable to assess
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
## 2022-03-10 NOTE — ED BEHAVIORAL HEALTH ASSESSMENT NOTE - INVOLUNTARY INTRAMUSCULAR MEDICATION DETAILS
Ativan 2 mg IM given at 19:05; Haldol 5 mg IM at 20:33; Versal 2 mg IM at 21:23; Haldol 5 mg and Versed 4 mg IM at 22:24

## 2022-03-10 NOTE — H&P ADULT - PROBLEM SELECTOR PLAN 1
had 2 episode of GTC seizure in ED  CT head with no acute pathology   Valproic acid level 25  Received 1 G Keppra in ED  Load with valproic acid 1G  Continue Depakote DR 500mg bid   Seizure/aspiration precaution  EEG  Check valproic acid in AM  Neurology consulted- Dr Clay

## 2022-03-10 NOTE — H&P ADULT - PROBLEM SELECTOR PLAN 5
Had episodes of agitation in ED required multiple dose of haldol/benzo.  Resume home medications ( called patient's pharmacy [phone], extension [phone])  Abilify 30 daily, Latuda 40mg daily, Seroquel 50bid, Haldol 5mg bid, Naltrexone 20 daily, Trazone 100 hs  Psych consulted, appreciate recommendation

## 2022-03-10 NOTE — ED BEHAVIORAL HEALTH ASSESSMENT NOTE - DETAILS
Reports 2 past SAs, last in 2017 stating "I took a lot of pills to try to harm myself" affirming intent to die. discussed with ED provider self presented initially as per HPI; otherwise Unable to assess
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
## 2022-03-11 ENCOUNTER — TRANSCRIPTION ENCOUNTER (OUTPATIENT)
Age: 37
End: 2022-03-11

## 2022-03-11 VITALS
OXYGEN SATURATION: 98 % | TEMPERATURE: 98 F | SYSTOLIC BLOOD PRESSURE: 98 MMHG | DIASTOLIC BLOOD PRESSURE: 62 MMHG | HEART RATE: 104 BPM | RESPIRATION RATE: 18 BRPM

## 2022-03-11 LAB
ALBUMIN SERPL ELPH-MCNC: 2.9 G/DL — LOW (ref 3.3–5)
ALP SERPL-CCNC: 44 U/L — SIGNIFICANT CHANGE UP (ref 40–120)
ALT FLD-CCNC: 25 U/L — SIGNIFICANT CHANGE UP (ref 12–78)
ANION GAP SERPL CALC-SCNC: 5 MMOL/L — SIGNIFICANT CHANGE UP (ref 5–17)
AST SERPL-CCNC: 19 U/L — SIGNIFICANT CHANGE UP (ref 15–37)
BILIRUB SERPL-MCNC: 0.3 MG/DL — SIGNIFICANT CHANGE UP (ref 0.2–1.2)
BUN SERPL-MCNC: 14 MG/DL — SIGNIFICANT CHANGE UP (ref 7–23)
CALCIUM SERPL-MCNC: 8.4 MG/DL — LOW (ref 8.5–10.1)
CHLORIDE SERPL-SCNC: 112 MMOL/L — HIGH (ref 96–108)
CO2 SERPL-SCNC: 24 MMOL/L — SIGNIFICANT CHANGE UP (ref 22–31)
CREAT SERPL-MCNC: 0.7 MG/DL — SIGNIFICANT CHANGE UP (ref 0.5–1.3)
CULTURE RESULTS: SIGNIFICANT CHANGE UP
EGFR: 116 ML/MIN/1.73M2 — SIGNIFICANT CHANGE UP
GLUCOSE SERPL-MCNC: 81 MG/DL — SIGNIFICANT CHANGE UP (ref 70–99)
HCT VFR BLD CALC: 33.3 % — LOW (ref 34.5–45)
HGB BLD-MCNC: 10.9 G/DL — LOW (ref 11.5–15.5)
MAGNESIUM SERPL-MCNC: 2.3 MG/DL — SIGNIFICANT CHANGE UP (ref 1.6–2.6)
MCHC RBC-ENTMCNC: 30.6 PG — SIGNIFICANT CHANGE UP (ref 27–34)
MCHC RBC-ENTMCNC: 32.7 G/DL — SIGNIFICANT CHANGE UP (ref 32–36)
MCV RBC AUTO: 93.5 FL — SIGNIFICANT CHANGE UP (ref 80–100)
NRBC # BLD: 0 /100 WBCS — SIGNIFICANT CHANGE UP (ref 0–0)
PHOSPHATE SERPL-MCNC: 3.2 MG/DL — SIGNIFICANT CHANGE UP (ref 2.5–4.5)
PLATELET # BLD AUTO: 128 K/UL — LOW (ref 150–400)
POTASSIUM SERPL-MCNC: 3.8 MMOL/L — SIGNIFICANT CHANGE UP (ref 3.5–5.3)
POTASSIUM SERPL-SCNC: 3.8 MMOL/L — SIGNIFICANT CHANGE UP (ref 3.5–5.3)
PROT SERPL-MCNC: 6.3 GM/DL — SIGNIFICANT CHANGE UP (ref 6–8.3)
RBC # BLD: 3.56 M/UL — LOW (ref 3.8–5.2)
RBC # FLD: 15.5 % — HIGH (ref 10.3–14.5)
SODIUM SERPL-SCNC: 141 MMOL/L — SIGNIFICANT CHANGE UP (ref 135–145)
SPECIMEN SOURCE: SIGNIFICANT CHANGE UP
VALPROATE SERPL-MCNC: 44 UG/ML — LOW (ref 50–100)
WBC # BLD: 5.95 K/UL — SIGNIFICANT CHANGE UP (ref 3.8–10.5)
WBC # FLD AUTO: 5.95 K/UL — SIGNIFICANT CHANGE UP (ref 3.8–10.5)

## 2022-03-11 PROCEDURE — 99222 1ST HOSP IP/OBS MODERATE 55: CPT

## 2022-03-11 PROCEDURE — 99239 HOSP IP/OBS DSCHRG MGMT >30: CPT

## 2022-03-11 PROCEDURE — 99231 SBSQ HOSP IP/OBS SF/LOW 25: CPT

## 2022-03-11 RX ORDER — DIPHENHYDRAMINE HCL 50 MG
50 CAPSULE ORAL ONCE
Refills: 0 | Status: COMPLETED | OUTPATIENT
Start: 2022-03-11 | End: 2022-03-11

## 2022-03-11 RX ORDER — MOMETASONE FUROATE 220 UG/1
0 INHALANT RESPIRATORY (INHALATION)
Qty: 0 | Refills: 0 | DISCHARGE
Start: 2022-03-11

## 2022-03-11 RX ORDER — FERROUS SULFATE 325(65) MG
1 TABLET ORAL
Qty: 30 | Refills: 0
Start: 2022-03-11

## 2022-03-11 RX ORDER — QUETIAPINE FUMARATE 200 MG/1
1 TABLET, FILM COATED ORAL
Qty: 0 | Refills: 0 | DISCHARGE
Start: 2022-03-11

## 2022-03-11 RX ORDER — LEVOTHYROXINE SODIUM 125 MCG
1 TABLET ORAL
Qty: 0 | Refills: 0 | DISCHARGE
Start: 2022-03-11

## 2022-03-11 RX ORDER — ARIPIPRAZOLE 15 MG/1
1 TABLET ORAL
Qty: 0 | Refills: 0 | DISCHARGE
Start: 2022-03-11

## 2022-03-11 RX ORDER — DIVALPROEX SODIUM 500 MG/1
1 TABLET, DELAYED RELEASE ORAL
Qty: 0 | Refills: 0 | DISCHARGE
Start: 2022-03-11

## 2022-03-11 RX ORDER — PANTOPRAZOLE SODIUM 20 MG/1
1 TABLET, DELAYED RELEASE ORAL
Qty: 0 | Refills: 0 | DISCHARGE
Start: 2022-03-11

## 2022-03-11 RX ORDER — NALTREXONE HYDROCHLORIDE 50 MG/1
1 TABLET, FILM COATED ORAL
Qty: 0 | Refills: 0 | DISCHARGE
Start: 2022-03-11

## 2022-03-11 RX ORDER — TOPIRAMATE 25 MG
1 TABLET ORAL
Qty: 0 | Refills: 0 | DISCHARGE
Start: 2022-03-11

## 2022-03-11 RX ORDER — MOMETASONE FUROATE 220 UG/1
1 INHALANT RESPIRATORY (INHALATION)
Qty: 0 | Refills: 0 | DISCHARGE
Start: 2022-03-11

## 2022-03-11 RX ORDER — RIVAROXABAN 15 MG-20MG
1 KIT ORAL
Qty: 0 | Refills: 0 | DISCHARGE
Start: 2022-03-11

## 2022-03-11 RX ORDER — LURASIDONE HYDROCHLORIDE 40 MG/1
1 TABLET ORAL
Qty: 0 | Refills: 0 | DISCHARGE
Start: 2022-03-11

## 2022-03-11 RX ORDER — ALBUTEROL 90 UG/1
2 AEROSOL, METERED ORAL
Qty: 0 | Refills: 0 | DISCHARGE
Start: 2022-03-11

## 2022-03-11 RX ORDER — HALOPERIDOL DECANOATE 100 MG/ML
5 INJECTION INTRAMUSCULAR ONCE
Refills: 0 | Status: COMPLETED | OUTPATIENT
Start: 2022-03-11 | End: 2022-03-11

## 2022-03-11 RX ADMIN — DIVALPROEX SODIUM 500 MILLIGRAM(S): 500 TABLET, DELAYED RELEASE ORAL at 06:21

## 2022-03-11 RX ADMIN — ARIPIPRAZOLE 30 MILLIGRAM(S): 15 TABLET ORAL at 11:43

## 2022-03-11 RX ADMIN — QUETIAPINE FUMARATE 50 MILLIGRAM(S): 200 TABLET, FILM COATED ORAL at 06:22

## 2022-03-11 RX ADMIN — HALOPERIDOL DECANOATE 5 MILLIGRAM(S): 100 INJECTION INTRAMUSCULAR at 15:17

## 2022-03-11 RX ADMIN — MOMETASONE FUROATE 1 PUFF(S): 220 INHALANT RESPIRATORY (INHALATION) at 11:43

## 2022-03-11 RX ADMIN — Medication 25 MILLIGRAM(S): at 11:43

## 2022-03-11 RX ADMIN — Medication 1 MILLIGRAM(S): at 13:37

## 2022-03-11 RX ADMIN — Medication 2 MILLIGRAM(S): at 12:08

## 2022-03-11 RX ADMIN — Medication 75 MICROGRAM(S): at 06:22

## 2022-03-11 RX ADMIN — NALTREXONE HYDROCHLORIDE 50 MILLIGRAM(S): 50 TABLET, FILM COATED ORAL at 11:43

## 2022-03-11 RX ADMIN — Medication 50 MILLIGRAM(S): at 15:16

## 2022-03-11 RX ADMIN — PANTOPRAZOLE SODIUM 40 MILLIGRAM(S): 20 TABLET, DELAYED RELEASE ORAL at 06:22

## 2022-03-11 RX ADMIN — Medication 50 MILLIGRAM(S): at 12:11

## 2022-03-11 NOTE — BH CONSULTATION LIAISON PROGRESS NOTE - NSBHCONSULTFOLLOWAFTERCARE_PSY_A_CORE FT
cleared top return to residence once medically cleared 
cleared top return to residence once medically cleared; does not need referrals or prescriptions as she filled all her medications for this month already and has established outpatient follow up for both psychiatric and medical services.

## 2022-03-11 NOTE — PROGRESS NOTE ADULT - SUBJECTIVE AND OBJECTIVE BOX
called by primary team to evaluate patient for gross hematuria.  pt is 36 years old female with h/o epilepsy, Factor V Leiden mutation with h/o DVT/PE, IBS, hypothyroidism, endometriosis, bipolar disorder that presented to ED with complaints of left flank pain and hematuria. Pt admitted 3/11 for seizures.  case discussed with psychiatry, Dr Gonzales.  Pt is from group home with has strict documentation regarding her menses.  pt currently on menses.  Would recommend a straight catheterization or markham placement to confirm gross hematuria.  Pt currently refusing catheter placement.  No acute  intervention at this time.  Pt can follow up as an outpatient called by primary team to evaluate patient for gross hematuria.  pt is 36 years old female with h/o epilepsy, Factor V Leiden mutation with h/o DVT/PE, IBS, hypothyroidism, endometriosis, bipolar disorder that presented to ED with complaints of left flank pain and hematuria. Pt admitted 3/11 after having two seizures.  case discussed with psychiatry, Dr Gonzales.  Pt is from group home with has strict documentation regarding her menses.  pt currently on menses.  Would recommend a straight catheterization or markham placement to confirm gross hematuria.  Pt currently refusing catheter placement.  No acute  intervention at this time.  Pt can follow up as an outpatient

## 2022-03-11 NOTE — DISCHARGE NOTE NURSING/CASE MANAGEMENT/SOCIAL WORK - PATIENT PORTAL LINK FT
You can access the FollowMyHealth Patient Portal offered by [facility] by registering at the following website: [email or web address]. By joining [facility] FollowMyHealth portal, you will also be able to view your health information using other applications (apps) compatible with our system.
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
## 2022-03-11 NOTE — BH CONSULTATION LIAISON PROGRESS NOTE - NSBHCHARTREVIEWVS_PSY_A_CORE FT
Vital Signs Last 24 Hrs  T(C): 37.1 (11 Mar 2022 11:13), Max: 37.1 (11 Mar 2022 11:13)  T(F): 98.8 (11 Mar 2022 11:13), Max: 98.8 (11 Mar 2022 11:13)  HR: 108 (11 Mar 2022 11:13) (68 - 108)  BP: 94/59 (11 Mar 2022 11:13) (92/63 - 104/77)  BP(mean): --  RR: 16 (11 Mar 2022 11:13) (16 - 18)  SpO2: 99% (11 Mar 2022 11:13) (97% - 100%)
Vital Signs Last 24 Hrs  T(C): 36.6 (10 Mar 2022 14:57), Max: 97.8 (10 Mar 2022 11:18)  T(F): 97.8 (10 Mar 2022 14:57), Max: 208 (10 Mar 2022 11:18)  HR: 75 (10 Mar 2022 14:57) (75 - 98)  BP: 104/77 (10 Mar 2022 14:57) (100/66 - 138/87)  BP(mean): --  RR: 18 (10 Mar 2022 14:57) (16 - 19)  SpO2: 100% (10 Mar 2022 14:57) (97% - 100%)

## 2022-03-11 NOTE — DISCHARGE NOTE PROVIDER - ATTENDING DISCHARGE PHYSICAL EXAMINATION:
vitals:  BP 92/63  pulse: 68  Temp: 97.8F  Pulse ox 98% on RA, RR 16/min    Gen: NAD, Alert/ awake  Heent: perrla, eomi  lungs: CTA b/l  CVS: s1, S2, regular  Abd: soft, non tender, BS +  exter: no LE edema  neuro: alert,awake, oriented, normal speech

## 2022-03-11 NOTE — DISCHARGE NOTE PROVIDER - CARE PROVIDER_API CALL
Charly Tello)  Urology  [address] [place] [address]  Phone: [phone]  Fax: [phone]  Follow Up Time: 1 week    PCP,   Fu with PCP within 1-3 days  Phone: (   )    -  Fax: (   )    -  Follow Up Time:     GYN,   Fu with GYN for fibroid uterus / simple ovarian cyst in 1-2 weeks  Phone: (   )    -  Fax: (   )    -  Follow Up Time:
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
## 2022-03-11 NOTE — DISCHARGE NOTE PROVIDER - NSDCMRMEDTOKEN_GEN_ALL_CORE_FT
acetaminophen 650 mg oral tablet, extended release: 1 tab(s) orally 4 times a day   acetaminophen 650 mg oral tablet, extended release: 1 tab(s) orally every 6 hours   Carafate 1 g oral tablet: 1 tab(s) orally every 8 hours   Keppra 500 mg oral tablet: 1 tab(s) orally once a day   predniSONE 50 mg oral tablet: 1 tab(s) orally once a day   Reglan 10 mg oral tablet: 1 tab(s) orally 2 times a day    albuterol 90 mcg/inh inhalation aerosol: 2 puff(s) inhaled every 6 hours, As needed, Shortness of Breath and/or Wheezing  ARIPiprazole 30 mg oral tablet: 1 tab(s) orally once a day  divalproex sodium 500 mg oral delayed release tablet: 1 tab(s) orally 2 times a day  FeroSul 325 mg (65 mg elemental iron) oral tablet: 1 tab(s) orally once a day   levothyroxine 75 mcg (0.075 mg) oral tablet: 1 tab(s) orally once a day  lurasidone 40 mg oral tablet: 1 tab(s) orally once a day  mometasone 220 mcg/inh inhalation aerosol powder: 1 puff(s) inhaled once a day  naltrexone 50 mg oral tablet: 1 tab(s) orally once a day  pantoprazole 40 mg oral delayed release tablet: 1 tab(s) orally once a day (before a meal)  QUEtiapine 50 mg oral tablet: 1 tab(s) orally 2 times a day  rivaroxaban 20 mg oral tablet: 1 tab(s) orally once a day (before a meal)  topiramate 25 mg oral tablet: 1 tab(s) orally once a day

## 2022-03-11 NOTE — DISCHARGE NOTE NURSING/CASE MANAGEMENT/SOCIAL WORK - NSDCPEFALRISK_GEN_ALL_CORE
For information on Fall & Injury Prevention, visit: [email or web address] OR  [email or web address] OR  https://www.cdc.gov/steadi/patient.html

## 2022-03-11 NOTE — BH CONSULTATION LIAISON PROGRESS NOTE - NSBHFUPINTERVALHXFT_PSY_A_CORE
Noted interval events since last seen involving aggressive, intentional behavior which is Pt's well known baseline and is NOT secondary to a primary psyhcotic or mood process but a manifestation of Patient's Axis II pathologies. No subsequent seizures; Pt is on her menses hence hematuria. COLLATERAL FROM PHARMACY: Writer called Pt's pharmacy yesterday, RX prescriber a Dr Weiner and RX last filled on 3/1/22; on multiple antipsychotics.   ISTOP Reference #: [id number] no record of patient 
Patient seen by Telepsychiatry in the ED; s/p several IM/IVPs for agitation secondary to long ED stay and baseline limited frustration tolerance. Pt was initially cleared to go home until she had two witnessed seizures and was subsequently admitted to medicine. EXAM: sleeping comfortably on the stretcher; no subsequent seizure or agitation as per CO 1;1 staff. COLLATERAL FROM PHARMACY: Writer called Pt's pharmacy, RX prescriber a Dr Weiner and RX last feilled on 3/1/22; on multiple antipsychotics.   ISTOP Reference #: [id number] no record of patient

## 2022-03-11 NOTE — BH CONSULTATION LIAISON PROGRESS NOTE - CURRENT MEDICATION
MEDICATIONS  (STANDING):  ARIPiprazole 30 milliGRAM(s) Oral daily  levothyroxine 75 MICROGram(s) Oral daily  lurasidone 40 milliGRAM(s) Oral daily  mometasone 220 MICROgram(s) Inhaler 1 Puff(s) Inhalation daily  naltrexone 50 milliGRAM(s) Oral daily  pantoprazole    Tablet 40 milliGRAM(s) Oral before breakfast  QUEtiapine 50 milliGRAM(s) Oral two times a day  rivaroxaban 20 milliGRAM(s) Oral with dinner  topiramate 25 milliGRAM(s) Oral daily  valproate sodium IVPB 1000 milliGRAM(s) IV Intermittent once    MEDICATIONS  (PRN):  acetaminophen     Tablet .. 650 milliGRAM(s) Oral every 6 hours PRN Mild Pain (1 - 3), Moderate Pain (4 - 6)  ALBUTerol    90 MICROgram(s) HFA Inhaler 2 Puff(s) Inhalation every 6 hours PRN Shortness of Breath and/or Wheezing  diphenhydrAMINE Injectable 50 milliGRAM(s) IntraMuscular every 4 hours PRN agitation  melatonin 3 milliGRAM(s) Oral at bedtime PRN Insomnia  
MEDICATIONS  (STANDING):  ARIPiprazole 30 milliGRAM(s) Oral daily  diVALproex  milliGRAM(s) Oral two times a day  levothyroxine 75 MICROGram(s) Oral daily  lurasidone 40 milliGRAM(s) Oral daily  mometasone 220 MICROgram(s) Inhaler 1 Puff(s) Inhalation daily  naltrexone 50 milliGRAM(s) Oral daily  pantoprazole    Tablet 40 milliGRAM(s) Oral before breakfast  QUEtiapine 50 milliGRAM(s) Oral two times a day  rivaroxaban 20 milliGRAM(s) Oral with dinner  topiramate 25 milliGRAM(s) Oral daily    MEDICATIONS  (PRN):  acetaminophen     Tablet .. 650 milliGRAM(s) Oral every 6 hours PRN Mild Pain (1 - 3), Moderate Pain (4 - 6)  ALBUTerol    90 MICROgram(s) HFA Inhaler 2 Puff(s) Inhalation every 6 hours PRN Shortness of Breath and/or Wheezing  diphenhydrAMINE Injectable 50 milliGRAM(s) IntraMuscular every 4 hours PRN agitation  LORazepam   Injectable 2 milliGRAM(s) IntraMuscular every 2 hours PRN severe agitation  melatonin 3 milliGRAM(s) Oral at bedtime PRN Insomnia

## 2022-03-11 NOTE — DISCHARGE NOTE PROVIDER - PROVIDER TOKENS
PROVIDER:[TOKEN:[[id number]],FOLLOWUP:[1 week]],FREE:[LAST:[PCP],PHONE:[(   )    -],FAX:[(   )    -],ADDRESS:[Fu with PCP within 1-3 days]],FREE:[LAST:[GYN],PHONE:[(   )    -],FAX:[(   )    -],ADDRESS:[Fu with GYN for fibroid uterus / simple ovarian cyst in 1-2 weeks]]

## 2022-03-11 NOTE — BH CONSULTATION LIAISON PROGRESS NOTE - NSICDXBHSECONDARYDX_PSY_ALL_CORE
Intellectual disability   F79  Personality disorder   F60.9  Mood disorder   F39  
Intellectual disability   F79  Personality disorder   F60.9  Mood disorder   F39
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
## 2022-03-11 NOTE — DISCHARGE NOTE PROVIDER - NSDCCPCAREPLAN_GEN_ALL_CORE_FT
PRINCIPAL DISCHARGE DIAGNOSIS  Diagnosis: Status epilepticus  Assessment and Plan of Treatment:       SECONDARY DISCHARGE DIAGNOSES  Diagnosis: Hematuria  Assessment and Plan of Treatment:

## 2022-03-12 ENCOUNTER — EMERGENCY (EMERGENCY)
Facility: HOSPITAL | Age: 37
LOS: 0 days | Discharge: HOME | End: 2022-03-12
Attending: STUDENT IN AN ORGANIZED HEALTH CARE EDUCATION/TRAINING PROGRAM
Payer: MEDICARE

## 2022-03-12 ENCOUNTER — EMERGENCY (EMERGENCY)
Facility: HOSPITAL | Age: 37
LOS: 0 days | Discharge: HOME | End: 2022-03-12
Attending: EMERGENCY MEDICINE
Payer: MEDICARE

## 2022-03-12 VITALS
RESPIRATION RATE: 16 BRPM | OXYGEN SATURATION: 98 % | TEMPERATURE: 98 F | SYSTOLIC BLOOD PRESSURE: 141 MMHG | HEART RATE: 111 BPM | WEIGHT: 176.37 LBS | DIASTOLIC BLOOD PRESSURE: 98 MMHG | HEIGHT: 68 IN

## 2022-03-12 VITALS
DIASTOLIC BLOOD PRESSURE: 90 MMHG | RESPIRATION RATE: 18 BRPM | HEIGHT: 68 IN | OXYGEN SATURATION: 99 % | HEART RATE: 88 BPM | TEMPERATURE: 97 F | SYSTOLIC BLOOD PRESSURE: 134 MMHG | WEIGHT: 220.02 LBS

## 2022-03-12 VITALS
TEMPERATURE: 99 F | RESPIRATION RATE: 16 BRPM | SYSTOLIC BLOOD PRESSURE: 119 MMHG | OXYGEN SATURATION: 100 % | HEART RATE: 97 BPM | DIASTOLIC BLOOD PRESSURE: 84 MMHG

## 2022-03-12 DIAGNOSIS — R55 SYNCOPE AND COLLAPSE: ICD-10-CM

## 2022-03-12 DIAGNOSIS — Z88.6 ALLERGY STATUS TO ANALGESIC AGENT: ICD-10-CM

## 2022-03-12 DIAGNOSIS — Z88.8 ALLERGY STATUS TO OTHER DRUGS, MEDICAMENTS AND BIOLOGICAL SUBSTANCES STATUS: ICD-10-CM

## 2022-03-12 DIAGNOSIS — Z91.040 LATEX ALLERGY STATUS: ICD-10-CM

## 2022-03-12 DIAGNOSIS — R42 DIZZINESS AND GIDDINESS: ICD-10-CM

## 2022-03-12 DIAGNOSIS — F31.9 BIPOLAR DISORDER, UNSPECIFIED: ICD-10-CM

## 2022-03-12 DIAGNOSIS — Z91.018 ALLERGY TO OTHER FOODS: ICD-10-CM

## 2022-03-12 DIAGNOSIS — R11.0 NAUSEA: ICD-10-CM

## 2022-03-12 DIAGNOSIS — R63.1 POLYDIPSIA: ICD-10-CM

## 2022-03-12 DIAGNOSIS — E86.0 DEHYDRATION: ICD-10-CM

## 2022-03-12 DIAGNOSIS — R31.9 HEMATURIA, UNSPECIFIED: ICD-10-CM

## 2022-03-12 DIAGNOSIS — Z88.0 ALLERGY STATUS TO PENICILLIN: ICD-10-CM

## 2022-03-12 DIAGNOSIS — N30.91 CYSTITIS, UNSPECIFIED WITH HEMATURIA: ICD-10-CM

## 2022-03-12 DIAGNOSIS — R68.2 DRY MOUTH, UNSPECIFIED: ICD-10-CM

## 2022-03-12 DIAGNOSIS — I82.409 ACUTE EMBOLISM AND THROMBOSIS OF UNSPECIFIED DEEP VEINS OF UNSPECIFIED LOWER EXTREMITY: ICD-10-CM

## 2022-03-12 DIAGNOSIS — Z88.5 ALLERGY STATUS TO NARCOTIC AGENT: ICD-10-CM

## 2022-03-12 PROCEDURE — 93010 ELECTROCARDIOGRAM REPORT: CPT

## 2022-03-12 PROCEDURE — 99284 EMERGENCY DEPT VISIT MOD MDM: CPT

## 2022-03-12 PROCEDURE — 99283 EMERGENCY DEPT VISIT LOW MDM: CPT

## 2022-03-12 RX ORDER — HALOPERIDOL DECANOATE 100 MG/ML
5 INJECTION INTRAMUSCULAR ONCE
Refills: 0 | Status: COMPLETED | OUTPATIENT
Start: 2022-03-12 | End: 2022-03-12

## 2022-03-12 RX ORDER — DIPHENHYDRAMINE HCL 50 MG
25 CAPSULE ORAL ONCE
Refills: 0 | Status: COMPLETED | OUTPATIENT
Start: 2022-03-12 | End: 2022-03-12

## 2022-03-12 RX ORDER — SODIUM CHLORIDE 9 MG/ML
2000 INJECTION, SOLUTION INTRAVENOUS ONCE
Refills: 0 | Status: DISCONTINUED | OUTPATIENT
Start: 2022-03-12 | End: 2022-03-12

## 2022-03-12 RX ORDER — AZTREONAM 2 G
1 VIAL (EA) INJECTION
Qty: 6 | Refills: 0
Start: 2022-03-12 | End: 2022-03-14

## 2022-03-12 RX ORDER — DIAZEPAM 5 MG
2 TABLET ORAL ONCE
Refills: 0 | Status: DISCONTINUED | OUTPATIENT
Start: 2022-03-12 | End: 2022-03-12

## 2022-03-12 RX ORDER — DIVALPROEX SODIUM 500 MG/1
500 TABLET, DELAYED RELEASE ORAL ONCE
Refills: 0 | Status: COMPLETED | OUTPATIENT
Start: 2022-03-12 | End: 2022-03-12

## 2022-03-12 RX ADMIN — DIVALPROEX SODIUM 500 MILLIGRAM(S): 500 TABLET, DELAYED RELEASE ORAL at 08:01

## 2022-03-12 RX ADMIN — Medication 25 MILLIGRAM(S): at 17:03

## 2022-03-12 RX ADMIN — Medication 2 MILLIGRAM(S): at 14:15

## 2022-03-12 RX ADMIN — HALOPERIDOL DECANOATE 5 MILLIGRAM(S): 100 INJECTION INTRAMUSCULAR at 17:03

## 2022-03-12 RX ADMIN — Medication 2 MILLIGRAM(S): at 08:50

## 2022-03-12 NOTE — ED ADULT TRIAGE NOTE - CHIEF COMPLAINT QUOTE
pt recently discharged from this ED, no one home when transport arrived. Pts aide was called and reporting out shopping and wouldn't be coming back. PT returned to ED.

## 2022-03-12 NOTE — ED PROVIDER NOTE - CLINICAL SUMMARY MEDICAL DECISION MAKING FREE TEXT BOX
37 y/o F with PMHx of Factor V Leiden, Seizure, DVT/PE (Xarelto), Endometriosis and Asthma to the ED who presents to the ED from group home after visit earlier today where patient was discharged EMS took patient to group home but attendants not there to receive her so patient returned here. Complains of thirst and mild orthostatic presyncope. Vitals unremarkable. Exam with dry mucous membranes. Basic labs yesterday were unremarkable. Patient with water at bedside and amenable to eating to help with mild dehydration on exam. Will try to arrange return home,  to reach out to social work and group home to coordinate. 37 y/o F with PMHx of Factor V Leiden, Seizure, DVT/PE (Xarelto), Endometriosis and Asthma to the ED who presents to the ED from group home after visit earlier today where patient was discharged EMS took patient to group home but attendants not there to receive her so patient returned here. Complains of thirst and mild orthostatic presyncope. Vitals unremarkable. Exam with dry mucous membranes. Basic labs yesterday were unremarkable. Patient with water at bedside and amenable to eating to help with mild dehydration on exam. Will try to arrange return home,  to reach out to social work and group home to coordinate.    Patient expresses 3 days dysuria and hematuria. Will treat empirically for UTI with bactrim at home. Patient should followup with PCP. 35 y/o F with PMHx of Factor V Leiden, Seizure, DVT/PE (Xarelto), Endometriosis and Asthma to the ED who presents to the ED from group home after visit earlier today where patient was discharged EMS took patient to group home but attendants not there to receive her so patient returned here. Complains of thirst and mild orthostatic presyncope. Vitals unremarkable. Exam with dry mucous membranes. Basic labs yesterday were unremarkable. Patient with water at bedside and amenable to eating to help with mild dehydration on exam. Will try to arrange return home,  to reach out to social work and group home to coordinate.    Patient expresses 3 days dysuria and hematuria. Will treat empirically for UTI with bactrim at home. Patient should followup with PCP.    Patient requesting 5mg IM haldol, 3mg ativan, 25mg benadryl for anxiety. Will give haldol and benadryl and have her go home with group home representative who is here.

## 2022-03-12 NOTE — ED PROVIDER NOTE - PATIENT PORTAL LINK FT
You can access the FollowMyHealth Patient Portal offered by [facility] by registering at the following website: [email or web address]. By joining [facility]’s FollowMyHealth portal, you will also be able to view your health information using other applications (apps) compatible with our system.

## 2022-03-12 NOTE — ED ADULT NURSE NOTE - NSIMPLEMENTINTERV_GEN_ALL_ED
Implemented All Universal Safety Interventions:  [place] to call system. Call bell, personal items and telephone within reach. Instruct patient to call for assistance. Room bathroom lighting operational. Non-slip footwear when patient is off stretcher. Physically safe environment: no spills, clutter or unnecessary equipment. Stretcher in lowest position, wheels locked, appropriate side rails in place.

## 2022-03-12 NOTE — ED PROVIDER NOTE - NSFOLLOWUPINSTRUCTIONS_ED_ALL_ED_FT
Followup with your primary doctor if symptoms not improved. Followup with your primary doctor if symptoms not improved. Take antibiotics we sent to your pharmacy for UTI.

## 2022-03-12 NOTE — ED PROVIDER NOTE - NSFOLLOWUPINSTRUCTIONS_ED_ALL_ED_FT
1) Follow up with your primary care doctor  2) Follow-up with GYN  3) Follow up with urology  4) Return to the ER for worsening or concerning symptoms

## 2022-03-12 NOTE — ED PROVIDER NOTE - OBJECTIVE STATEMENT
35 y/o F with PMHx of Factor V Leiden, Seizure, DVT/PE (Xarelto), Endometriosis and Asthma to the ED who presents to the ED after returned from group home (group home attendants not there to receive her) after discharge earlier today. Patient currently complains of dry mouth, intermittent dizziness worse with standing. Per chart review from visit earlier today:    Pt complaining of "hematuria. Pt is known to the ED with frequent visits to the ED in the past x1 week and was discharged yesterday s/p seizure. Pt comes to the ED multiple times reporting hematuria, however, yesterday, it was discovered that pt is on menses, with multiple CT scans showing no acute pathology except for left adnexal cyst. Pt states she came back today endorsing nausea, dizziness and feels that symptoms of hematuria came back"

## 2022-03-12 NOTE — ED PROVIDER NOTE - CARE PLAN
Principal Discharge DX:	Mild dehydration   1 Principal Discharge DX:	Mild dehydration  Secondary Diagnosis:	Acute cystitis

## 2022-03-12 NOTE — ED ADULT NURSE NOTE - OBJECTIVE STATEMENT
Patient received ambulatory c/o nausea today. Patient is currently aox3 v/s wnl, lung sounds clear jessika abdomen soft no tenderness on palpation, skin warm, dry and intact. Patient EKG NSR  ,[id number]. Patient had PO challenge  ,tolerated well. Patient is in stable condition at present time.

## 2022-03-12 NOTE — ED ADULT NURSE REASSESSMENT NOTE - NS ED NURSE REASSESS COMMENT FT1
Patient in waiting room demanding discharge paperwor, refusing to return to assigned stretcher.. Paperwork reviewed with patient and patient signed receipt of discharge instructions which were reviewed verbally with patient. Patient agreed to return to ED care area after some encouragement. Placed in Chair in E.

## 2022-03-12 NOTE — ED PROVIDER NOTE - CONSTITUTIONAL, MLM
*Medical Student Practice Note*     ID: Mr. Shaka Riley is a 57 y/o male with a past medical history of Crohn's, non-ischemic cardiomyopathy with EF of 25-30% that resolved, atrial fibrillation due to pulmonary embolism, NSTEMI, HTN, obesity, hyperlipidemia, sleep apnea, chronic alcohol use, and cholecystitis requiring a lap cholecystectomy 07/2020 who was admitted 20 days ago for acute pancreatitis.      Interval Events:  -Had altered mental status per night nurse   -Patient was found to be chewing on hemodialysis catheter last night but it is not damaged  -He pulled his jorgensen catheter out  -Oxygen increased to 4L from 3L yesterday     Subjective  Mr. Riley says he is feeling better this morning. He does not recall pulling out his jorgensen or chewing on his dialysis catheter. His version of events is that he got up to the bathroom and fell which damaged his dialysis catheter. He is awake and oriented to where he is, but patient does not seem as focused in today's interview. He forgets some of the questions I ask him during his answer, and I have to repeat multiple questions due to a vacant stare.     Review of Systems  *See HPI*  General: denies fevers, rigors, fatigue  HEENT: denies blurry vision or rhinorrhea   Cardiac: denies angina or palpitations, no dyspnea while laying bed  Respiratory: +shortness of breath when trying to move, no cough  : denies any blood at urethral meatus; denies dysuria   GI: denies nausea or vomiting, denies diarrhea and constipation, abdominal fullness has improved from yesterday   Extremities: legs feel less heavy compared to yesterday     Objective  Vitals  T: 97.4-98.8 BP: 103-125/52-77  P: 84-94  RR: 16-20  O2: 91-97% on 4L via nasal cannula     Physical Exam   General: altered mental status; obese, white male  Skin: Multiple bruises on abdomen with indurations at injection sites from heparin  HEENT: normocephalic, atraumatic, no conjunctival injection or scleral icterus, right  internal jugal vein catheter does not appear to be damaged despite overnight event  Cardiovascular: normal rate and rhythm, no murmurs appreciated  Pulmonary: chest symmetry with respirations, clear to auscultation bilaterally, on 4L via nasal cannula    Abdomen: scars from lap monico, multiple bruises and indurations at heparin injection sites, normoactive bowel sounds, decreased distension from yesterday, soft and non-tender to palpation in all 4 quadrants, no guarding, no shifting dullness  Extremities: bilateral lower extremity edema 1+ appear increased from yesterday; IV in posterior right forearm  Neurological: Sensation intact in all extremities, no cranial nerve deficits appreciated     Labs  CBC (pancyotpenic)   -lymphopenia WBC 4.2  (previously 3.2, 4.5, 5.6)  -continues to be anemically stable with Hgb 8.8 (previously 8.5, 8.5)  -continues to be thrombocytopenic for 3rd day in a row Plt 74 (previously 71, 80, 172)     CMP:   -continues to be hyperglycemic Glu 138 (previously 140)   -Kidney function labs improved slightly BUN 64was 65, 88, creatinine 4.95   -Continues to be hypocalcemic Ca 8.1 (previously 8.2)      Imaging  ECHO: 60% LVEF; mild concentric LVH, mildly dilated R. Ventricle, enlarged r. Atrium, mitral annular calcification, trace tricuspid regurgitation      Assessment/Plan   Mr. Riley is a 59 y/o male admitted 01/14/2021 for acute pancreatitis. His hospital stay has been complicated by an ileus/SBO, acute renal failure, acute hypercapnic respiratory failure requiring intubation for 2 days, and sepsis. He is no longer hypotensive, his electrolytes are improving, and kidney function appears to be improving. Mr. Riley had altered mental status overnight which was also evident this morning.       #Bacteremia likely secondary to contaminated PICC line  -Blood cultures grew staph aureus (methicillin sensitive) which was likely contracted from an infected PICC line that was removed  -There have  been 3 negative blood cultures in a row as of 2/2/2021  -He was receiving cefazolin 2g q24hrs scheduled through 2/13, however, labs suggest this is likely causing his thrombocytopenia  -ID has been consulted and switched patient to IV daptomycin 910 mg q 48hrs through 02/15/21      #Acute Renal Failure secondary to Acute kidney injury secondary to hypotension secondary to acute respiratory failure compounded with sepsis  -Nephrology consulted  -Patient has had 2 back-to-back hemodialysis sessions  -Too early to ascertain whether kidney function improvement on labs is from spontaneous recovery or a consequence of the therapeutic benefit of dialysis   -Discontinue midodrine 10mg as patient has been normotensive for 24hrs   -Refrain from using nephrotoxic medications     #Delirium  -Likely multifactorial due to extended hospital stay, illness, pain medications, and ropinirol  -Discontinue pain meds and ropinirole   -Open blinds in patient room, encourage sitting in chair for meals and avoiding daytime napping/sleeping, have patient ambulate as tolerated    #Thrombocytopenia   -Blood smear, Coagulation and HIT panels do not suggest a common hemostasis disorder which supports our highest differential of an adverse reaction to cefazolin as the culprit  -Cefazolin switched to daptomycin to treat ongoing bacteremia  -Continue heparin DVT prophylaxis       #Hypernatremia secondary to NG tube  -Resolved most likely due to NG tube switched off suction and patient returning to clear liquid diet     #Acute Pancreatitis secondary to chronic alcohol abuse  -Pt denies any abdominal pain or back pain today  -Have patient ambulating as tolerated and working with PT/OT  -Pain being managed with acetaminophen 650mg (use conservatively with ongoing ONOFRE)   -monitor for any changes in symptoms     #Ileus vs SBO likely secondary to a combination of medications, acute pancreatitis, ethanol abuse, and PMH of Crohns   -Order NG tube to be  removed today as patient tolerated it being off while also on a clear liquid diet  -Abdominal X-ray suggests similar amount of abdominal distension as previous reading, however pt had one soft bowel movement   -Advance diet as tolerated   -Encourage pt to ambulate and work with PT/OT  -Monitor for changes in symptoms      #Fluid Retention secondary to acute renal failure  -Patient feels symptomatic and physical exam suggest worsening abdominal distension and lower extremity edema despite 2 dialysis sessions  -Add diuretic (Furosemide)   -Encourage patient to ambulate as tolerated     #Hyperglycemia secondary to diabetes mellitus   -Pt placed on sliding insulin scale   -Continue to monitor and adjust insulin dosing upon returning to normal diet     #Back pain  -Continue to treat with lidocaine patch  -Have patient sit in chair for meals  -Encourage ambulation as tolerated      #Crohn disease  -Self reports it being in remission for years     #Obesity  -Alcohol cessation and weight loss counseling at discharge     Note written by: Denise Francisco MS3  Attending Physician: Dr. Girish Ching    normal... Well appearing, awake, alert, oriented to person, place, time/situation and in no apparent distress.

## 2022-03-12 NOTE — ED ADULT NURSE NOTE - OBJECTIVE STATEMENT
Patient received via stretcher after being discharged and retuned due to know one being home at the group home as per EMS. Patient is currently aox3 v/s wnl, lung sounds clear jessika ,abdomen soft no tender on palpation, skin warm, dry and intact. Patient is in stable condition at present time and is being monitored closely.

## 2022-03-12 NOTE — ED ADULT NURSE REASSESSMENT NOTE - NS ED NURSE REASSESS COMMENT FT1
Patient was picked up by staff from [facility] via car and was discharged in there care in stable condition.

## 2022-03-12 NOTE — ED PROVIDER NOTE - QRS
"It is NOT necessary to avoid eating ""greens\"" while taking warfarin. You just need to be consistent in approximately how much you eat each week. There are only 5 FOODS TO AVOID with warfarin:      Do NOT eat: Cooked spinach  (raw is okay though). Cranberries in any form (juice, muffins, craisens). Grapefruit. Mangoes      Pomegranate      Please call the [address] [place] [address] if any of your medications change. This means: if a med is discontinued, a new med is started, or a dose is changed. These meds may interact with your warfarin and we may need to adjust your dose. This is especially important if you start any type of ANTIBIOTIC.      "
84

## 2022-03-12 NOTE — ED PROVIDER NOTE - NS ED MD DISPO DISCHARGE
Patient: Debi Arellano Date: 2017   : 1945 Attending: Luc Valdez MD   71 year old female      [facility]    PROCEDURE ASSESSMENT   (LOCAL ANESTHESIA - Not for use with Conscious Sedation)    Preop Diagnosis / Indications for Procedure:  M50.320 Degeneration of intervertebral disc of mid-cervical region, unspecified spinal level  (primary encounter diagnosis)  M54.12 Cervical radiculopathy    Planned Procedure: Epicural Steroid Injection Cervical/Thoracic      Planned Anesthetic:  local      MEDICAL HISTORY / COMORBID CONDITIONS:  Medical / surgical history    Past Medical History:   Diagnosis Date   • Allergic rhinitis    • Blood clot associated with vein wall inflammation     left leg   • Bronchitis    • Cerebral palsy (CMS/HCC)    • Chronic daily headache    • Chronic pain    • DDD (degenerative disc disease), cervical    • Deafness congenital    • Depression    • Diabetes mellitus (CMS/HCC)     Onset 2012, type    • DVT (deep venous thrombosis) (CMS/[facility])    • Essential (primary) hypertension    • Fall 2015   • Gastroesophageal reflux disease    • High cholesterol    • Inflammatory bowel disease    • Kidney stones    • Osteoarthritis    • Osteopenia 2014    Bone Density 2014   • Pneumonia     2015   • RAD (reactive airway disease)    • Sleep apnea    • Spondylolisthesis     chronic LBP   • Urinary incontinence    • Urinary tract infection        Normal mental status:   yes    EXAMINATION PERTINENT TO PROCEDURE BEING PERFORMED  Evaluation of operative site     BACK:  Gait is normal.  The patient can walk on heels and toes.    The thoracolumbar spine has a normal alignment.  The pelvis is level.    The patient is moderately tender to palpation.    The SI joints are nontender.   The sciatica notches are nontender.   The patient has 90 degrees of forward flexion. Side bending and trunk rotation are normal.      Neurologic exam:  Deep tendon reflexes are two plus and symmetrical at the  patella tendon and the Achilles tendon.  Sensation to light touch is normal in a dermatomal distribution.  Motor strength is 5/5 in a dermatomal distribution.      Straight leg raising is negative bilateral , in both a sitting position and supine.    NECK: The cervical spine has a full range of motion. There are no radicular signs with extension and compression of the cervical spine.     OTHER FINDINGS  Reviewed current medications and allergies yes      PERTINENT LAB / DIAGNOSTIC TESTS  PROTIME (sec)   Date Value   04/24/2017 66.7 (H)      INR (no units)   Date Value   09/13/2017 1.0 (A)       INFORMED CONSENT  Consent obtained: yes    Risks / benefits, complications, and alternatives explained, questions answered and patient / personal representative agrees to procedure listed above and anesthetic listed above.   Home

## 2022-03-12 NOTE — ED ADULT TRIAGE NOTE - CHIEF COMPLAINT QUOTE
complain of blood in urine started sunday, complain of burning pain when urinating and nausea. recently discharged with the same symptoms.

## 2022-03-12 NOTE — ED PROVIDER NOTE - NSICDXPASTMEDICALHX_GEN_ALL_CORE_FT
PAST MEDICAL HISTORY:  Asthma     Factor V Leiden     Seizure       Bipolar disorder     Endometriosis     History of DVT in adulthood

## 2022-03-12 NOTE — ED PROVIDER NOTE - CARE PROVIDER_API CALL
Adolfo Ignacio)  OBSGYN  Human Reproduction  [phone] [place] [address]  Phone: [phone]  Fax: [phone]  Follow Up Time:     Charly Tello)  Urology  [address] [place] [address]  Phone: [phone]  Fax: [phone]  Follow Up Time:

## 2022-03-12 NOTE — ED PROVIDER NOTE - PATIENT PORTAL LINK FT
You can access the FollowMyHealth Patient Portal offered by [facility] by registering at the following website: [email or web address]. By joining [facility]’s FollowMyHealth portal, you will also be able to view your health information using other applications (apps) compatible with our system.

## 2022-03-14 ENCOUNTER — APPOINTMENT (OUTPATIENT)
Dept: PULMONOLOGY | Facility: CLINIC | Age: 37
End: 2022-03-14

## 2022-03-15 LAB — LEVETIRACETAM SERPL-MCNC: 17.5 UG/ML — SIGNIFICANT CHANGE UP (ref 10–40)

## 2022-03-16 ENCOUNTER — EMERGENCY (EMERGENCY)
Facility: HOSPITAL | Age: 37
LOS: 1 days | Discharge: HOME | End: 2022-03-16
Attending: STUDENT IN AN ORGANIZED HEALTH CARE EDUCATION/TRAINING PROGRAM | Admitting: STUDENT IN AN ORGANIZED HEALTH CARE EDUCATION/TRAINING PROGRAM
Payer: MEDICARE

## 2022-03-16 VITALS
OXYGEN SATURATION: 100 % | SYSTOLIC BLOOD PRESSURE: 109 MMHG | RESPIRATION RATE: 18 BRPM | TEMPERATURE: 98 F | DIASTOLIC BLOOD PRESSURE: 78 MMHG | HEIGHT: 68 IN | HEART RATE: 88 BPM

## 2022-03-16 VITALS
HEART RATE: 83 BPM | RESPIRATION RATE: 18 BRPM | DIASTOLIC BLOOD PRESSURE: 78 MMHG | OXYGEN SATURATION: 100 % | SYSTOLIC BLOOD PRESSURE: 111 MMHG

## 2022-03-16 LAB
ALBUMIN SERPL ELPH-MCNC: 4 G/DL — SIGNIFICANT CHANGE UP (ref 3.3–5)
ALP SERPL-CCNC: 48 U/L — SIGNIFICANT CHANGE UP (ref 40–120)
ALT FLD-CCNC: 18 U/L — SIGNIFICANT CHANGE UP (ref 4–33)
ANION GAP SERPL CALC-SCNC: 10 MMOL/L — SIGNIFICANT CHANGE UP (ref 7–14)
AST SERPL-CCNC: 22 U/L — SIGNIFICANT CHANGE UP (ref 4–32)
BASOPHILS # BLD AUTO: 0.02 K/UL — SIGNIFICANT CHANGE UP (ref 0–0.2)
BASOPHILS NFR BLD AUTO: 0.3 % — SIGNIFICANT CHANGE UP (ref 0–2)
BILIRUB SERPL-MCNC: 0.2 MG/DL — SIGNIFICANT CHANGE UP (ref 0.2–1.2)
BUN SERPL-MCNC: 8 MG/DL — SIGNIFICANT CHANGE UP (ref 7–23)
CALCIUM SERPL-MCNC: 9.2 MG/DL — SIGNIFICANT CHANGE UP (ref 8.4–10.5)
CHLORIDE SERPL-SCNC: 109 MMOL/L — HIGH (ref 98–107)
CO2 SERPL-SCNC: 24 MMOL/L — SIGNIFICANT CHANGE UP (ref 22–31)
CREAT SERPL-MCNC: 0.9 MG/DL — SIGNIFICANT CHANGE UP (ref 0.5–1.3)
EGFR: 86 ML/MIN/1.73M2 — SIGNIFICANT CHANGE UP
EOSINOPHIL # BLD AUTO: 0.04 K/UL — SIGNIFICANT CHANGE UP (ref 0–0.5)
EOSINOPHIL NFR BLD AUTO: 0.7 % — SIGNIFICANT CHANGE UP (ref 0–6)
GLUCOSE SERPL-MCNC: 83 MG/DL — SIGNIFICANT CHANGE UP (ref 70–99)
HCG SERPL-ACNC: <5 MIU/ML — SIGNIFICANT CHANGE UP
HCT VFR BLD CALC: 32.5 % — LOW (ref 34.5–45)
HGB BLD-MCNC: 10.2 G/DL — LOW (ref 11.5–15.5)
IANC: 2.82 K/UL — SIGNIFICANT CHANGE UP (ref 1.5–8.5)
IMM GRANULOCYTES NFR BLD AUTO: 0.3 % — SIGNIFICANT CHANGE UP (ref 0–1.5)
LYMPHOCYTES # BLD AUTO: 2.29 K/UL — SIGNIFICANT CHANGE UP (ref 1–3.3)
LYMPHOCYTES # BLD AUTO: 39.5 % — SIGNIFICANT CHANGE UP (ref 13–44)
MCHC RBC-ENTMCNC: 29.9 PG — SIGNIFICANT CHANGE UP (ref 27–34)
MCHC RBC-ENTMCNC: 31.4 GM/DL — LOW (ref 32–36)
MCV RBC AUTO: 95.3 FL — SIGNIFICANT CHANGE UP (ref 80–100)
MONOCYTES # BLD AUTO: 0.61 K/UL — SIGNIFICANT CHANGE UP (ref 0–0.9)
MONOCYTES NFR BLD AUTO: 10.5 % — SIGNIFICANT CHANGE UP (ref 2–14)
NEUTROPHILS # BLD AUTO: 2.82 K/UL — SIGNIFICANT CHANGE UP (ref 1.8–7.4)
NEUTROPHILS NFR BLD AUTO: 48.7 % — SIGNIFICANT CHANGE UP (ref 43–77)
NRBC # BLD: 0 /100 WBCS — SIGNIFICANT CHANGE UP
NRBC # FLD: 0.02 K/UL — HIGH
PLATELET # BLD AUTO: 129 K/UL — LOW (ref 150–400)
POTASSIUM SERPL-MCNC: 4.1 MMOL/L — SIGNIFICANT CHANGE UP (ref 3.5–5.3)
POTASSIUM SERPL-SCNC: 4.1 MMOL/L — SIGNIFICANT CHANGE UP (ref 3.5–5.3)
PROT SERPL-MCNC: 6.6 G/DL — SIGNIFICANT CHANGE UP (ref 6–8.3)
RBC # BLD: 3.41 M/UL — LOW (ref 3.8–5.2)
RBC # FLD: 15.2 % — HIGH (ref 10.3–14.5)
SODIUM SERPL-SCNC: 143 MMOL/L — SIGNIFICANT CHANGE UP (ref 135–145)
WBC # BLD: 5.8 K/UL — SIGNIFICANT CHANGE UP (ref 3.8–10.5)
WBC # FLD AUTO: 5.8 K/UL — SIGNIFICANT CHANGE UP (ref 3.8–10.5)

## 2022-03-16 PROCEDURE — 99285 EMERGENCY DEPT VISIT HI MDM: CPT

## 2022-03-16 PROCEDURE — 76937 US GUIDE VASCULAR ACCESS: CPT | Mod: 26

## 2022-03-16 PROCEDURE — 73590 X-RAY EXAM OF LOWER LEG: CPT | Mod: 26,RT

## 2022-03-16 PROCEDURE — 93971 EXTREMITY STUDY: CPT | Mod: 26,LT

## 2022-03-16 RX ORDER — HALOPERIDOL DECANOATE 100 MG/ML
5 INJECTION INTRAMUSCULAR ONCE
Refills: 0 | Status: COMPLETED | OUTPATIENT
Start: 2022-03-16 | End: 2022-03-16

## 2022-03-16 RX ORDER — CEPHALEXIN 500 MG
1 CAPSULE ORAL
Qty: 28 | Refills: 0
Start: 2022-03-16 | End: 2022-03-22

## 2022-03-16 RX ORDER — DIPHENHYDRAMINE HCL 50 MG
25 CAPSULE ORAL ONCE
Refills: 0 | Status: COMPLETED | OUTPATIENT
Start: 2022-03-16 | End: 2022-03-16

## 2022-03-16 RX ORDER — TOPIRAMATE 25 MG
25 TABLET ORAL ONCE
Refills: 0 | Status: COMPLETED | OUTPATIENT
Start: 2022-03-16 | End: 2022-03-16

## 2022-03-16 RX ORDER — CEFAZOLIN SODIUM 1 G
1000 VIAL (EA) INJECTION ONCE
Refills: 0 | Status: COMPLETED | OUTPATIENT
Start: 2022-03-16 | End: 2022-03-16

## 2022-03-16 RX ORDER — QUETIAPINE FUMARATE 200 MG/1
50 TABLET, FILM COATED ORAL ONCE
Refills: 0 | Status: COMPLETED | OUTPATIENT
Start: 2022-03-16 | End: 2022-03-16

## 2022-03-16 RX ORDER — METOCLOPRAMIDE HCL 10 MG
10 TABLET ORAL ONCE
Refills: 0 | Status: COMPLETED | OUTPATIENT
Start: 2022-03-16 | End: 2022-03-16

## 2022-03-16 RX ORDER — DIVALPROEX SODIUM 500 MG/1
500 TABLET, DELAYED RELEASE ORAL ONCE
Refills: 0 | Status: COMPLETED | OUTPATIENT
Start: 2022-03-16 | End: 2022-03-16

## 2022-03-16 RX ADMIN — Medication 25 MILLIGRAM(S): at 20:30

## 2022-03-16 RX ADMIN — Medication 25 MILLIGRAM(S): at 18:36

## 2022-03-16 RX ADMIN — DIVALPROEX SODIUM 500 MILLIGRAM(S): 500 TABLET, DELAYED RELEASE ORAL at 20:30

## 2022-03-16 RX ADMIN — Medication 100 MILLIGRAM(S): at 15:09

## 2022-03-16 RX ADMIN — Medication 1 MILLIGRAM(S): at 17:29

## 2022-03-16 RX ADMIN — QUETIAPINE FUMARATE 50 MILLIGRAM(S): 200 TABLET, FILM COATED ORAL at 20:30

## 2022-03-16 RX ADMIN — Medication 10 MILLIGRAM(S): at 17:29

## 2022-03-16 NOTE — ED PROVIDER NOTE - NSFOLLOWUPINSTRUCTIONS_ED_ALL_ED_FT
Rest, drink plenty of fluids.  Advance activity as tolerated.  Continue all previously prescribed medications as directed.  Follow up with your primary care physician in 48-72 hours- bring copies of your results.  Return to the ER for worsening or persistent symptoms, and/or ANY NEW OR CONCERNING SYMPTOMS. If you have issues obtaining follow up, please call: [phone] to obtain a doctor or specialist who takes your insurance in your area.  You may call [phone] to make an appointment with the internal medicine clinic.    Please follow up with the general surgery doctor, please return if symptoms worsen.

## 2022-03-16 NOTE — ED PROVIDER NOTE - NS ED ATTENDING STATEMENT MOD
This was a shared visit with the MISAEL. I reviewed and verified the documentation and independently performed the documented:

## 2022-03-16 NOTE — ED PROVIDER NOTE - SKIN COLOR
3x3 area of redness noted, no active purulent discharge noted, warmth and redness noted. no active bleeding or discharge. no obvious signs of infection./normal for race

## 2022-03-16 NOTE — ED ADULT TRIAGE NOTE - CHIEF COMPLAINT QUOTE
Patient brought to ER by EMS from [facility] for c/o right ankle swelling, pain and redness. Pt states she was at "[facility] and they attempted ot had placed an IV to ankle area and it became infected." Patient brought to ER by EMS from [facility] for c/o right ankle swelling, pain and redness. Skin is stretched and hard to the touch. Pt states she was at "[facility] and they attempted ot had placed an IV to ankle area and it became infected." Patient brought to ER by EMS from [facility] for c/o right ankle swelling, pain and redness. Skin is stretched and hard to the touch. Pt states she was at "[facility] and they attempted ot had placed an IV to ankle area and it became infected." Pt has factor 5 leiden.

## 2022-03-16 NOTE — PROVIDER CONTACT NOTE (OTHER) - ASSESSMENT
Per provider, JORGE L Bland, pt is is medically cleared and is able to return to their previous residence, [facility].   has spoke to Moy at [facility] (P#[phone]) and Director Nguyễn P#[phone].  SW confirmed that at this time time, pt's mode of transportation is Ambulance. Clinical provider in agreement with Ambulance back to [facility].  Verbal huddle regarding coordination of care completed with interdisciplinary team. Transportation arranged via [facility] for  at 7:30pm; Trip # [id number]. SW to remain available should needs rise.

## 2022-03-16 NOTE — ED PROVIDER NOTE - OBJECTIVE STATEMENT
this is a 36 y.o female with a PMhx of Factor 5 leiden, seizures, DVT/PE on xarelto, endometrisosis, asthma, from a group home, presented to the ED complaining of having RLE swelling and redness over the past several days, patient states that she had a IV placed in the leg and since then she started to have redness and swelling around ankle, states that she has had pain especially when ambulating, denies having any numbness or tingling in the leg. Patient denies having any fever or chills, denies having any a nausea, vomiting, diarrhea, constipation, CP, SOb, or JOSE. Patient states that she has had some oozing from the wound. Patient did have a seizure a few days prior where she at [facility] and received medications.

## 2022-03-16 NOTE — ED PROVIDER NOTE - ATTENDING CONTRIBUTION TO CARE
37 y/o F with PMHx of Factor V Leiden, Seizure, DVT/PE (Xarelto), Endometriosis and Asthma, bipolar do pw 3 days of right ankle pain, swelling and redness, pt had IV placed in her foot when she was at [facility] and then deveoped redness surrouidning area, no fevrs, chills,a ble to bear weight  able to flex ankle, noted that redness was spreading today so came to ed  on exam cellulitis of right tib/fib above ankle, able to range ankle, tracking noted up leg  likely cellulitsi, will ro dvt  abx, labs, reassess

## 2022-03-16 NOTE — ED PROVIDER NOTE - CLINICAL SUMMARY MEDICAL DECISION MAKING FREE TEXT BOX
this is a 36 y.o female with a PMhx of Factor 5 leiden, seizures, DVT/PE on xarelto, endometrisosis, asthma, from a group home, presented to the ED complaining of having RLE swelling and redness over the past several days. RLE swelling likely cellulitis vs thrombopheblitis-labs, meds, us, xray reassess

## 2022-03-16 NOTE — ED PROVIDER NOTE - PATIENT PORTAL LINK FT
You can access the FollowMyHealth Patient Portal offered by  by registering at the following website: [email or web address]. By joining [facility]’s FollowMyHealth portal, you will also be able to view your health information using other applications (apps) compatible with our system.

## 2022-03-16 NOTE — ED ADULT NURSE NOTE - CHIEF COMPLAINT QUOTE
Patient brought to ER by EMS from [facility] for c/o right ankle swelling, pain and redness. Skin is stretched and hard to the touch. Pt states she was at "[facility] and they attempted ot had placed an IV to ankle area and it became infected." Pt has factor 5 leiden.

## 2022-03-17 RX ORDER — CHLORPROMAZINE HCL 10 MG
50 TABLET ORAL ONCE
Refills: 0 | Status: COMPLETED | OUTPATIENT
Start: 2022-03-17 | End: 2022-03-17

## 2022-03-17 RX ADMIN — Medication 50 MILLIGRAM(S): at 00:18

## 2022-03-17 RX ADMIN — Medication 1 MILLIGRAM(S): at 00:19

## 2022-03-17 RX ADMIN — HALOPERIDOL DECANOATE 5 MILLIGRAM(S): 100 INJECTION INTRAMUSCULAR at 00:07

## 2022-03-24 ENCOUNTER — INPATIENT (INPATIENT)
Facility: HOSPITAL | Age: 37
LOS: 6 days | Discharge: HOME | End: 2022-03-31
Attending: INTERNAL MEDICINE | Admitting: INTERNAL MEDICINE
Payer: MEDICARE

## 2022-03-24 VITALS
SYSTOLIC BLOOD PRESSURE: 118 MMHG | OXYGEN SATURATION: 99 % | WEIGHT: 265 LBS | RESPIRATION RATE: 18 BRPM | HEART RATE: 100 BPM | HEIGHT: 68 IN | TEMPERATURE: 98 F | DIASTOLIC BLOOD PRESSURE: 82 MMHG

## 2022-03-24 PROCEDURE — 93010 ELECTROCARDIOGRAM REPORT: CPT

## 2022-03-24 PROCEDURE — 99285 EMERGENCY DEPT VISIT HI MDM: CPT

## 2022-03-24 RX ORDER — ALBUTEROL 90 UG/1
2 AEROSOL, METERED ORAL
Refills: 0 | Status: COMPLETED | OUTPATIENT
Start: 2022-03-24 | End: 2022-03-25

## 2022-03-24 RX ADMIN — Medication 125 MILLIGRAM(S): at 23:16

## 2022-03-24 NOTE — ED ADULT NURSE NOTE - DOES PATIENT HAVE ADVANCE DIRECTIVE
Report received from Britt LEMUS. No new changes. Patient is slightly anxious. Is currently on RA. No s/s of resp distress. Bed in low locked position, call bell within reach.   No

## 2022-03-24 NOTE — ED PROVIDER NOTE - OBJECTIVE STATEMENT
36 y.o female with a PMhx of Factor 5 leiden, seizures, DVT/PE on xarelto, endometrisosis, asthma presenting for bilateral lower extremity swelling. has been happening for 2 weeks, seen in this ED 2 weeks ago and Rx keflex for cellulitis at that time with minimal improvement. also has been having mild sob, which she has been using her albuterol pump for. compliant with her xarelto. no chest pain. feels mildly weak and like she is having chills. also with urinary frequency without dysuria or hematuria.

## 2022-03-24 NOTE — ED PROVIDER NOTE - CLINICAL SUMMARY MEDICAL DECISION MAKING FREE TEXT BOX
legs with mild swelling, do not clinically appear infected, will send cultures. pulses intact. will obtain DVT studies considering history of factor V leiden and prior dvt/pe. PE low risk despite risk factors, wheezing is more consistent with asthma will administer bronchodilators and steroids, evlauate for underlying infectious etiology. risk stratify for PE with d-dimer; however if DVT studies positive will obtain PE study. ekg nonconerning.

## 2022-03-24 NOTE — ED ADULT TRIAGE NOTE - WEIGHT METHOD
----- Message from Ahn Acosta MD sent at 11/7/2019  5:11 PM CST -----  Cas Aly,   Your Ultrasound showed small indeterminate mass.  I think we should obtain an MRI for better evaluation.  I placed an order for MRI please call this number to schedule [phone].      Anh Acosta MD.   stated

## 2022-03-24 NOTE — ED ADULT TRIAGE NOTE - CHIEF COMPLAINT QUOTE
Patient c/o cellulitis on right LE x 2 weeks and completed antibiotics and c/o bilateral LE edema x 1 week. PMH htn, epilepsy, hypothyroidism, asthma.

## 2022-03-24 NOTE — ED PROVIDER NOTE - PHYSICAL EXAMINATION
General: Awake, alert and oriented. No acute distress. Well developed, hydrated and nourished. Appears stated age.   Skin: Skin in warm, dry and intact without rashes or lesions. Appropriate color for ethnicity  HENMT: head normocephalic and atraumatic; bilateral external ears without swelling. no nasal discharge. moist oral mucosa. supple neck, trachea midline  EYES: Conjunctiva clear. nonicteric sclera. EOM intact, Eyelids are normal in appearance without swelling or lesions.  Cardiac: well perfused, s1, s2, rrr  Respiratory: breathing comfortably on room air. mild diffuse end expiratory wheezing  Abdominal: nondistended  MSK: Neck and back are without deformity, visible external skin changes, or signs of trauma. Curvature of the cervical, thoracic, and lumbar spine are within normal limits. no external signs of trauma. no apparent deficits in ROM of any extremity. mild swelling of bilateral lwoer extremities with mild diffuse ttp. marked off area from ED visit 2 weeks ago without signifcant erythema within or spreading beyond that area.   Neurological: The patient is awake, alert and oriented to person, place, and time with normal speech. CN 2-12 grossly intact. no apparent deficits. Memory is normal and thought process is intact. No gait abnormalities are appreciated.   Psychiatric: Appropriate mood and affect. Good judgement and insight. No visual or auditory hallucinations.

## 2022-03-25 DIAGNOSIS — I82.409 ACUTE EMBOLISM AND THROMBOSIS OF UNSPECIFIED DEEP VEINS OF UNSPECIFIED LOWER EXTREMITY: ICD-10-CM

## 2022-03-25 DIAGNOSIS — R09.89 OTHER SPECIFIED SYMPTOMS AND SIGNS INVOLVING THE CIRCULATORY AND RESPIRATORY SYSTEMS: ICD-10-CM

## 2022-03-25 PROBLEM — N80.9 ENDOMETRIOSIS, UNSPECIFIED: Chronic | Status: ACTIVE | Noted: 2022-03-16

## 2022-03-25 PROBLEM — F31.9 BIPOLAR DISORDER, UNSPECIFIED: Chronic | Status: ACTIVE | Noted: 2022-03-16

## 2022-03-25 LAB
ALBUMIN SERPL ELPH-MCNC: 3.6 G/DL — SIGNIFICANT CHANGE UP (ref 3.3–5)
ALP SERPL-CCNC: 49 U/L — SIGNIFICANT CHANGE UP (ref 40–120)
ALT FLD-CCNC: 21 U/L — SIGNIFICANT CHANGE UP (ref 12–78)
ANION GAP SERPL CALC-SCNC: 5 MMOL/L — SIGNIFICANT CHANGE UP (ref 5–17)
APPEARANCE UR: CLEAR — SIGNIFICANT CHANGE UP
AST SERPL-CCNC: 17 U/L — SIGNIFICANT CHANGE UP (ref 15–37)
BACTERIA # UR AUTO: ABNORMAL
BILIRUB SERPL-MCNC: 0.1 MG/DL — LOW (ref 0.2–1.2)
BILIRUB UR-MCNC: NEGATIVE — SIGNIFICANT CHANGE UP
BUN SERPL-MCNC: 14 MG/DL — SIGNIFICANT CHANGE UP (ref 7–23)
CALCIUM SERPL-MCNC: 9.1 MG/DL — SIGNIFICANT CHANGE UP (ref 8.5–10.1)
CHLORIDE SERPL-SCNC: 112 MMOL/L — HIGH (ref 96–108)
CK SERPL-CCNC: 80 U/L — SIGNIFICANT CHANGE UP (ref 26–192)
CO2 SERPL-SCNC: 25 MMOL/L — SIGNIFICANT CHANGE UP (ref 22–31)
COLOR SPEC: YELLOW — SIGNIFICANT CHANGE UP
CREAT SERPL-MCNC: 0.81 MG/DL — SIGNIFICANT CHANGE UP (ref 0.5–1.3)
D DIMER BLD IA.RAPID-MCNC: 435 NG/ML DDU — HIGH
DIFF PNL FLD: ABNORMAL
EGFR: 98 ML/MIN/1.73M2 — SIGNIFICANT CHANGE UP
EPI CELLS # UR: ABNORMAL
FLUAV AG NPH QL: SIGNIFICANT CHANGE UP
FLUBV AG NPH QL: SIGNIFICANT CHANGE UP
GLUCOSE SERPL-MCNC: 85 MG/DL — SIGNIFICANT CHANGE UP (ref 70–99)
GLUCOSE UR QL: NEGATIVE MG/DL — SIGNIFICANT CHANGE UP
HCG SERPL-ACNC: <1 MIU/ML — SIGNIFICANT CHANGE UP
HCT VFR BLD CALC: 36.1 % — SIGNIFICANT CHANGE UP (ref 34.5–45)
HGB BLD-MCNC: 11.2 G/DL — LOW (ref 11.5–15.5)
KETONES UR-MCNC: ABNORMAL
LEUKOCYTE ESTERASE UR-ACNC: ABNORMAL
MCHC RBC-ENTMCNC: 29.2 PG — SIGNIFICANT CHANGE UP (ref 27–34)
MCHC RBC-ENTMCNC: 31 G/DL — LOW (ref 32–36)
MCV RBC AUTO: 94 FL — SIGNIFICANT CHANGE UP (ref 80–100)
NITRITE UR-MCNC: NEGATIVE — SIGNIFICANT CHANGE UP
NRBC # BLD: 0 /100 WBCS — SIGNIFICANT CHANGE UP (ref 0–0)
NT-PROBNP SERPL-SCNC: 35 PG/ML — SIGNIFICANT CHANGE UP (ref 0–125)
PH UR: 7 — SIGNIFICANT CHANGE UP (ref 5–8)
PLATELET # BLD AUTO: 164 K/UL — SIGNIFICANT CHANGE UP (ref 150–400)
POTASSIUM SERPL-MCNC: 4.5 MMOL/L — SIGNIFICANT CHANGE UP (ref 3.5–5.3)
POTASSIUM SERPL-SCNC: 4.5 MMOL/L — SIGNIFICANT CHANGE UP (ref 3.5–5.3)
PROT SERPL-MCNC: 7.7 GM/DL — SIGNIFICANT CHANGE UP (ref 6–8.3)
PROT UR-MCNC: 15 MG/DL
RBC # BLD: 3.84 M/UL — SIGNIFICANT CHANGE UP (ref 3.8–5.2)
RBC # FLD: 14.9 % — HIGH (ref 10.3–14.5)
RBC CASTS # UR COMP ASSIST: ABNORMAL /HPF (ref 0–4)
SARS-COV-2 RNA SPEC QL NAA+PROBE: SIGNIFICANT CHANGE UP
SODIUM SERPL-SCNC: 142 MMOL/L — SIGNIFICANT CHANGE UP (ref 135–145)
SP GR SPEC: 1.01 — SIGNIFICANT CHANGE UP (ref 1.01–1.02)
TROPONIN I, HIGH SENSITIVITY RESULT: <3 NG/L — SIGNIFICANT CHANGE UP
UROBILINOGEN FLD QL: NEGATIVE MG/DL — SIGNIFICANT CHANGE UP
WBC # BLD: 4.83 K/UL — SIGNIFICANT CHANGE UP (ref 3.8–10.5)
WBC # FLD AUTO: 4.83 K/UL — SIGNIFICANT CHANGE UP (ref 3.8–10.5)
WBC UR QL: SIGNIFICANT CHANGE UP

## 2022-03-25 PROCEDURE — 99222 1ST HOSP IP/OBS MODERATE 55: CPT

## 2022-03-25 PROCEDURE — 93970 EXTREMITY STUDY: CPT | Mod: 26

## 2022-03-25 PROCEDURE — 71045 X-RAY EXAM CHEST 1 VIEW: CPT | Mod: 26

## 2022-03-25 PROCEDURE — 93010 ELECTROCARDIOGRAM REPORT: CPT | Mod: 76

## 2022-03-25 RX ORDER — CYCLOBENZAPRINE HYDROCHLORIDE 10 MG/1
5 TABLET, FILM COATED ORAL THREE TIMES A DAY
Refills: 0 | Status: DISCONTINUED | OUTPATIENT
Start: 2022-03-25 | End: 2022-03-31

## 2022-03-25 RX ORDER — LURASIDONE HYDROCHLORIDE 40 MG/1
40 TABLET ORAL DAILY
Refills: 0 | Status: DISCONTINUED | OUTPATIENT
Start: 2022-03-25 | End: 2022-03-31

## 2022-03-25 RX ORDER — MOMETASONE FUROATE 220 UG/1
1 INHALANT RESPIRATORY (INHALATION) DAILY
Refills: 0 | Status: DISCONTINUED | OUTPATIENT
Start: 2022-03-25 | End: 2022-03-31

## 2022-03-25 RX ORDER — TOPIRAMATE 25 MG
25 TABLET ORAL DAILY
Refills: 0 | Status: DISCONTINUED | OUTPATIENT
Start: 2022-03-25 | End: 2022-03-29

## 2022-03-25 RX ORDER — PANTOPRAZOLE SODIUM 20 MG/1
40 TABLET, DELAYED RELEASE ORAL
Refills: 0 | Status: DISCONTINUED | OUTPATIENT
Start: 2022-03-25 | End: 2022-03-29

## 2022-03-25 RX ORDER — ENOXAPARIN SODIUM 100 MG/ML
120 INJECTION SUBCUTANEOUS EVERY 12 HOURS
Refills: 0 | Status: DISCONTINUED | OUTPATIENT
Start: 2022-03-25 | End: 2022-03-26

## 2022-03-25 RX ORDER — ALBUTEROL 90 UG/1
2 AEROSOL, METERED ORAL EVERY 6 HOURS
Refills: 0 | Status: DISCONTINUED | OUTPATIENT
Start: 2022-03-25 | End: 2022-03-31

## 2022-03-25 RX ORDER — FERROUS SULFATE 325(65) MG
325 TABLET ORAL DAILY
Refills: 0 | Status: DISCONTINUED | OUTPATIENT
Start: 2022-03-25 | End: 2022-03-31

## 2022-03-25 RX ORDER — DIPHENHYDRAMINE HCL 50 MG
25 CAPSULE ORAL ONCE
Refills: 0 | Status: COMPLETED | OUTPATIENT
Start: 2022-03-25 | End: 2022-03-25

## 2022-03-25 RX ORDER — ENOXAPARIN SODIUM 100 MG/ML
120 INJECTION SUBCUTANEOUS ONCE
Refills: 0 | Status: COMPLETED | OUTPATIENT
Start: 2022-03-25 | End: 2022-03-25

## 2022-03-25 RX ORDER — QUETIAPINE FUMARATE 200 MG/1
50 TABLET, FILM COATED ORAL
Refills: 0 | Status: DISCONTINUED | OUTPATIENT
Start: 2022-03-25 | End: 2022-03-29

## 2022-03-25 RX ORDER — LEVOTHYROXINE SODIUM 125 MCG
75 TABLET ORAL DAILY
Refills: 0 | Status: DISCONTINUED | OUTPATIENT
Start: 2022-03-25 | End: 2022-03-29

## 2022-03-25 RX ORDER — MIDODRINE HYDROCHLORIDE 2.5 MG/1
10 TABLET ORAL ONCE
Refills: 0 | Status: COMPLETED | OUTPATIENT
Start: 2022-03-25 | End: 2022-03-25

## 2022-03-25 RX ORDER — ARIPIPRAZOLE 15 MG/1
30 TABLET ORAL DAILY
Refills: 0 | Status: DISCONTINUED | OUTPATIENT
Start: 2022-03-25 | End: 2022-03-27

## 2022-03-25 RX ORDER — DIVALPROEX SODIUM 500 MG/1
500 TABLET, DELAYED RELEASE ORAL
Refills: 0 | Status: DISCONTINUED | OUTPATIENT
Start: 2022-03-25 | End: 2022-03-27

## 2022-03-25 RX ORDER — INFLUENZA VIRUS VACCINE 15; 15; 15; 15 UG/.5ML; UG/.5ML; UG/.5ML; UG/.5ML
0.5 SUSPENSION INTRAMUSCULAR ONCE
Refills: 0 | Status: DISCONTINUED | OUTPATIENT
Start: 2022-03-25 | End: 2022-03-31

## 2022-03-25 RX ORDER — SODIUM CHLORIDE 9 MG/ML
500 INJECTION INTRAMUSCULAR; INTRAVENOUS; SUBCUTANEOUS ONCE
Refills: 0 | Status: COMPLETED | OUTPATIENT
Start: 2022-03-25 | End: 2022-03-25

## 2022-03-25 RX ORDER — METOCLOPRAMIDE HCL 10 MG
10 TABLET ORAL ONCE
Refills: 0 | Status: DISCONTINUED | OUTPATIENT
Start: 2022-03-25 | End: 2022-03-27

## 2022-03-25 RX ORDER — ACETAMINOPHEN 500 MG
650 TABLET ORAL EVERY 6 HOURS
Refills: 0 | Status: DISCONTINUED | OUTPATIENT
Start: 2022-03-25 | End: 2022-03-31

## 2022-03-25 RX ADMIN — CYCLOBENZAPRINE HYDROCHLORIDE 5 MILLIGRAM(S): 10 TABLET, FILM COATED ORAL at 11:15

## 2022-03-25 RX ADMIN — Medication 25 MILLIGRAM(S): at 21:12

## 2022-03-25 RX ADMIN — Medication 650 MILLIGRAM(S): at 09:41

## 2022-03-25 RX ADMIN — ALBUTEROL 2 PUFF(S): 90 AEROSOL, METERED ORAL at 02:21

## 2022-03-25 RX ADMIN — Medication 325 MILLIGRAM(S): at 11:16

## 2022-03-25 RX ADMIN — Medication 650 MILLIGRAM(S): at 08:48

## 2022-03-25 RX ADMIN — Medication 75 MICROGRAM(S): at 06:21

## 2022-03-25 RX ADMIN — ALBUTEROL 2 PUFF(S): 90 AEROSOL, METERED ORAL at 02:00

## 2022-03-25 RX ADMIN — MIDODRINE HYDROCHLORIDE 10 MILLIGRAM(S): 2.5 TABLET ORAL at 07:02

## 2022-03-25 RX ADMIN — DIVALPROEX SODIUM 500 MILLIGRAM(S): 500 TABLET, DELAYED RELEASE ORAL at 06:21

## 2022-03-25 RX ADMIN — MOMETASONE FUROATE 1 PUFF(S): 220 INHALANT RESPIRATORY (INHALATION) at 11:16

## 2022-03-25 RX ADMIN — QUETIAPINE FUMARATE 50 MILLIGRAM(S): 200 TABLET, FILM COATED ORAL at 06:21

## 2022-03-25 RX ADMIN — ENOXAPARIN SODIUM 120 MILLIGRAM(S): 100 INJECTION SUBCUTANEOUS at 03:59

## 2022-03-25 RX ADMIN — Medication 25 MILLIGRAM(S): at 11:15

## 2022-03-25 RX ADMIN — CYCLOBENZAPRINE HYDROCHLORIDE 5 MILLIGRAM(S): 10 TABLET, FILM COATED ORAL at 20:45

## 2022-03-25 RX ADMIN — ARIPIPRAZOLE 30 MILLIGRAM(S): 15 TABLET ORAL at 11:15

## 2022-03-25 RX ADMIN — ALBUTEROL 2 PUFF(S): 90 AEROSOL, METERED ORAL at 02:54

## 2022-03-25 RX ADMIN — ENOXAPARIN SODIUM 120 MILLIGRAM(S): 100 INJECTION SUBCUTANEOUS at 17:40

## 2022-03-25 RX ADMIN — DIVALPROEX SODIUM 500 MILLIGRAM(S): 500 TABLET, DELAYED RELEASE ORAL at 17:39

## 2022-03-25 RX ADMIN — QUETIAPINE FUMARATE 50 MILLIGRAM(S): 200 TABLET, FILM COATED ORAL at 18:05

## 2022-03-25 RX ADMIN — ALBUTEROL 2 PUFF(S): 90 AEROSOL, METERED ORAL at 01:29

## 2022-03-25 RX ADMIN — PANTOPRAZOLE SODIUM 40 MILLIGRAM(S): 20 TABLET, DELAYED RELEASE ORAL at 06:21

## 2022-03-25 RX ADMIN — SODIUM CHLORIDE 500 MILLILITER(S): 9 INJECTION INTRAMUSCULAR; INTRAVENOUS; SUBCUTANEOUS at 01:36

## 2022-03-25 NOTE — PATIENT PROFILE ADULT - FALL HARM RISK - UNIVERSAL INTERVENTIONS
Bed in lowest position, wheels locked, appropriate side rails in place/Call bell, personal items and telephone in reach/Instruct patient to call for assistance before getting out of bed or chair/Non-slip footwear when patient is out of bed/[place] to call system/Physically safe environment - no spills, clutter or unnecessary equipment/Purposeful Proactive Rounding/Room/bathroom lighting operational, light cord in reach

## 2022-03-25 NOTE — CONSULT NOTE ADULT - SUBJECTIVE AND OBJECTIVE BOX
Reason for Consultation: DVT    HPI: Patient is a 36y Female seen on consultatioin for the evaluation and management of DVT     37 y/o obese female with a PMHx of Factor 5 Leiden Deficiency, Seizures, DVT/PE on Xarelto Endometriosis, Asthma presents to the ED c/o B/L LE edema and SOB x 2 weeks. Pt reports she noted LE edema and redness around R ankle; as well as JOSE,; she was seen in this ED 2 weeks ago and Rx keflex for cellulitis at that time with minimal improvement. Pt also reports mild dry cough and chest pain w/ SOB for which she used her albuterol pump w/o improvement. Pt also reports feeling "hot and cold" at times.     Patient states does not take Xarelto with food as recommended, patient states complaint with Medications but not with food    PAST MEDICAL & SURGICAL HISTORY:  Asthma    Seizure    Factor V Leiden    Bipolar disorder    Endometriosis    History of DVT in adulthood    No significant past surgical history      MEDICATIONS  (STANDING):  ARIPiprazole 30 milliGRAM(s) Oral daily  diVALproex  milliGRAM(s) Oral two times a day  enoxaparin Injectable 120 milliGRAM(s) SubCutaneous every 12 hours  ferrous    sulfate 325 milliGRAM(s) Oral daily  influenza   Vaccine 0.5 milliLiter(s) IntraMuscular once  levothyroxine 75 MICROGram(s) Oral daily  lurasidone 40 milliGRAM(s) Oral daily  metoclopramide Injectable 10 milliGRAM(s) IV Push once  mometasone 220 MICROgram(s) Inhaler 1 Puff(s) Inhalation daily  pantoprazole    Tablet 40 milliGRAM(s) Oral before breakfast  QUEtiapine 50 milliGRAM(s) Oral two times a day  topiramate 25 milliGRAM(s) Oral daily    MEDICATIONS  (PRN):  acetaminophen     Tablet .. 650 milliGRAM(s) Oral every 6 hours PRN Temp greater or equal to 38C (100.4F), Mild Pain (1 - 3)  ALBUTerol    90 MICROgram(s) HFA Inhaler 2 Puff(s) Inhalation every 6 hours PRN Shortness of Breath and/or Wheezing  cyclobenzaprine 5 milliGRAM(s) Oral three times a day PRN Muscle Spasm      Allergies    latex (Blisters)  morphine (Unknown)  penicillin (Hives)  penicillin (Other)  pineapple (Unknown)  Toradol (Rash)  Toradol (Unknown)  Zofran (Hives)  Zofran (Unknown)    Intolerances        SOCIAL HISTORY:    Smoking Status: no  Alcohol: no  Occupation: no    FAMILY HISTORY:  Family history of DVT (Mother)      Vital Signs Last 24 Hrs  T(C): 36.8 (25 Mar 2022 07:53), Max: 36.9 (25 Mar 2022 04:14)  T(F): 98.2 (25 Mar 2022 07:53), Max: 98.4 (25 Mar 2022 04:14)  HR: 97 (25 Mar 2022 07:53) (82 - 100)  BP: 111/72 (25 Mar 2022 07:53) (90/54 - 118/82)  BP(mean): --  RR: 18 (25 Mar 2022 07:53) (18 - 22)  SpO2: 97% (25 Mar 2022 07:53) (96% - 99%)    PHYSICAL EXAM:    general - AAO x 3  HEENT - No Icterus  CVS - RRR  RS - AE B/L  Abd - soft, NT  Ext - Pulses +        LABS:                        11.2   4.83  )-----------( 164      ( 25 Mar 2022 00:31 )             36.1     03-25    142  |  112<H>  |  14  ----------------------------<  85  4.5   |  25  |  0.81    Ca    9.1      25 Mar 2022 00:31    TPro  7.7  /  Alb  3.6  /  TBili  0.1<L>  /  DBili  x   /  AST  17  /  ALT  21  /  AlkPhos  49  03-25      Urinalysis Basic - ( 25 Mar 2022 06:57 )    Color: Yellow / Appearance: Clear / S.010 / pH: x  Gluc: x / Ketone: Trace  / Bili: Negative / Urobili: Negative mg/dL   Blood: x / Protein: 15 mg/dL / Nitrite: Negative   Leuk Esterase: Trace / RBC: 3-5 /HPF / WBC 3-5   Sq Epi: x / Non Sq Epi: Many / Bacteria: Moderate

## 2022-03-25 NOTE — CHART NOTE - NSCHARTNOTEFT_GEN_A_CORE
seen and examined'  c/w lovenox  Heme consult placed  no clinical signs of PE, can hold off on CTA as management would be the same, pt needs lifelong AC     Vital Signs Last 24 Hrs  T(C): 36.8 (25 Mar 2022 07:53), Max: 36.9 (25 Mar 2022 04:14)  T(F): 98.2 (25 Mar 2022 07:53), Max: 98.4 (25 Mar 2022 04:14)  HR: 97 (25 Mar 2022 07:53) (82 - 100)  BP: 111/72 (25 Mar 2022 07:53) (90/54 - 118/82)  BP(mean): --  RR: 18 (25 Mar 2022 07:53) (18 - 22)  SpO2: 97% (25 Mar 2022 07:53) (96% - 99%)                            11.2   4.83  )-----------( 164      ( 25 Mar 2022 00:31 )             36.1     03-25    142  |  112<H>  |  14  ----------------------------<  85  4.5   |  25  |  0.81    Ca    9.1      25 Mar 2022 00:31    TPro  7.7  /  Alb  3.6  /  TBili  0.1<L>  /  DBili  x   /  AST  17  /  ALT  21  /  AlkPhos  49  03-25      Urinalysis Basic - ( 25 Mar 2022 06:57 )    Color: Yellow / Appearance: Clear / S.010 / pH: x  Gluc: x / Ketone: Trace  / Bili: Negative / Urobili: Negative mg/dL   Blood: x / Protein: 15 mg/dL / Nitrite: Negative   Leuk Esterase: Trace / RBC: 3-5 /HPF / WBC 3-5   Sq Epi: x / Non Sq Epi: Many / Bacteria: Moderate

## 2022-03-25 NOTE — H&P ADULT - NSHPPHYSICALEXAM_GEN_ALL_CORE
PHYSICAL EXAM:    Vital Signs Last 24 Hrs  T(C): 36.9 (25 Mar 2022 04:14), Max: 36.9 (25 Mar 2022 04:14)  T(F): 98.4 (25 Mar 2022 04:14), Max: 98.4 (25 Mar 2022 04:14)  HR: 83 (25 Mar 2022 04:14) (82 - 100)  BP: 98/50 (25 Mar 2022 04:14) (90/54 - 118/82)  BP(mean): --  RR: 22 (25 Mar 2022 04:14) (18 - 22)  SpO2: 96% (25 Mar 2022 04:14) (96% - 99%)    GENERAL: Pt lying in bed comfortably in NAD  HEENT:  Atraumatic, EOMI, PERRL, conjunctiva and sclera clear, MMM  NECK: Supple,  CHEST/LUNG: Clear to auscultation bilaterally  HEART: Regular rate and rhythm;  ABDOMEN: Bowel sounds present; Soft, Nontender, Nondistended.    EXTREMITIES:  2+ Peripheral Pulses, brisk capillary refill. Mild B/L edema, no erythema, small blister in RLE  NEUROLOGICAL:  Alert & Oriented X3, No deficits   MSK: FROM x 4 extremities   SKIN: No rashes or lesions

## 2022-03-25 NOTE — H&P ADULT - HISTORY OF PRESENT ILLNESS
37 y/o obese female with a PMHx of Factor 5 Leiden Deficiency, Seizures, DVT/PE on Xarelto Endometriosis, Asthma presents to the ED c/o B/L LE edema and SOB x 2 weeks. Pt reports she noted LE edema and redness around R ankle; as well as JOSE,; she was seen in this ED 2 weeks ago and Rx keflex for cellulitis at that time with minimal improvement. Pt also reports mild dry cough and chest pain w/ SOB for which she used her albuterol pump w/o improvement. Pt also reports feeling "hot and cold" at times. Of note pt reports she had been compliant with her Xarelto

## 2022-03-25 NOTE — H&P ADULT - NSHPLABSRESULTS_GEN_ALL_CORE
T(C): 36.9 (03-25-22 @ 04:14), Max: 36.9 (03-25-22 @ 04:14)  HR: 83 (03-25-22 @ 04:14) (82 - 100)  BP: 98/50 (03-25-22 @ 04:14) (90/54 - 118/82)  RR: 22 (03-25-22 @ 04:14) (18 - 22)  SpO2: 96% (03-25-22 @ 04:14) (96% - 99%)                        11.2   4.83  )-----------( 164      ( 25 Mar 2022 00:31 )             36.1     03-25    142  |  112<H>  |  14  ----------------------------<  85  4.5   |  25  |  0.81    Ca    9.1      25 Mar 2022 00:31    TPro  7.7  /  Alb  3.6  /  TBili  0.1<L>  /  DBili  x   /  AST  17  /  ALT  21  /  AlkPhos  49  03-25    LIVER FUNCTIONS - ( 25 Mar 2022 00:31 )  Alb: 3.6 g/dL / Pro: 7.7 gm/dL / ALK PHOS: 49 U/L / ALT: 21 U/L / AST: 17 U/L / GGT: x             < from: US Duplex Venous Lower Ext Complete, Bilateral (03.25.22 @ 03:00) >    IMPRESSION:    Deep venous thrombosis in the left popliteal vein, at the knee.  Findings discussed with Dr. Gold at 3:15 am on 3/25/22 with [facility].      < end of copied text >      ALBUTerol    90 MICROgram(s) HFA Inhaler 2 Puff(s) Inhalation every 6 hours PRN  ARIPiprazole 30 milliGRAM(s) Oral daily  diVALproex  milliGRAM(s) Oral two times a day  enoxaparin Injectable 120 milliGRAM(s) SubCutaneous every 12 hours  ferrous    sulfate 325 milliGRAM(s) Oral daily  levothyroxine 75 MICROGram(s) Oral daily  lurasidone 40 milliGRAM(s) Oral daily  mometasone 220 MICROgram(s) Inhaler 1 Puff(s) Inhalation daily  pantoprazole    Tablet 40 milliGRAM(s) Oral before breakfast  QUEtiapine 50 milliGRAM(s) Oral two times a day  topiramate 25 milliGRAM(s) Oral daily

## 2022-03-25 NOTE — H&P ADULT - NSICDXFAMILYHX_GEN_ALL_CORE_FT
FAMILY HISTORY:  Mother  Still living? Unknown  Family history of DVT, Age at diagnosis: Age Unknown

## 2022-03-25 NOTE — H&P ADULT - NSICDXPASTMEDICALHX_GEN_ALL_CORE_FT
PAST MEDICAL HISTORY:  Asthma     Bipolar disorder     Endometriosis     Factor V Leiden     History of DVT in adulthood     Seizure

## 2022-03-25 NOTE — CONSULT NOTE ADULT - PROBLEM SELECTOR RECOMMENDATION 9
- patient with History of Factor V leiden Mutation as per patients history, first she stated she follows with a hematologist in [place], and has been complaint with her mediciations but admitted to not taking with food  - patient started on Lovenox 120mg BID  - patient lives in [facility] and needs to be given her medication  - Psych F/u  - Patient does not think can be compliant with coumadin and considering Psych history may not be reliable to be on coumadin and complaint with INR checks  - can continue Lovenox 1mg/kg BID or consider changing to eliquis

## 2022-03-25 NOTE — H&P ADULT - ASSESSMENT
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    1) Acute DVT, likely PE  - pt reports compliance w/ Xarelto, no recent activity concerning for provoke clot and likely  - unable to obtain CT Angio due to lack of IV access even w/ US, midline ordered for the am, f/u CTA  - place on full dose Lovenox subq for now  - Hematology consult in am as pt likely failed Xarelto    2) Asthma  - Pt given IV Solumedrol in ED, clinically I do not appreciate any sns of an acute exacerbation  - SOB and mild cough possible URI or likely PE; CXR [facility]APD, CT Angio pending  - c/w home meds for now  - cont to monitor    3) Seizure Disorder  - c/w Depakote    4) Hypothyroidism  - c/w Synthroid    5) DVT ppx - FD Lovenox subq     37 y/o obese female with a PMHx of Factor 5 Leiden Deficiency, Seizures, DVT/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    1) Acute DVT, likely PE  - pt reports compliance w/ Xarelto, no recent activity concerning for provoke clot and likely  - unable to obtain CT Angio due to lack of IV access even w/ US, midline ordered for the am, f/u CTA  - place on full dose Lovenox subq for now  - Hematology consult in am as pt likely failed Xarelto; please call    2) Asthma  - Pt given IV Solumedrol in ED, clinically I do not appreciate any sns of an acute exacerbation  - SOB and mild cough possible URI or likely PE; CXR [facility], CT Angio pending  - c/w home meds for now  - cont to monitor    3) Seizure Disorder  - c/w Depakote    4) Hypothyroidism  - c/w Synthroid    5) DVT ppx - FD Lovenox subq

## 2022-03-26 ENCOUNTER — TRANSCRIPTION ENCOUNTER (OUTPATIENT)
Age: 37
End: 2022-03-26

## 2022-03-26 PROCEDURE — 99232 SBSQ HOSP IP/OBS MODERATE 35: CPT

## 2022-03-26 RX ORDER — HYDROCORTISONE 1 %
1 OINTMENT (GRAM) TOPICAL EVERY 6 HOURS
Refills: 0 | Status: DISCONTINUED | OUTPATIENT
Start: 2022-03-26 | End: 2022-03-26

## 2022-03-26 RX ORDER — FLUCONAZOLE 150 MG/1
150 TABLET ORAL ONCE
Refills: 0 | Status: COMPLETED | OUTPATIENT
Start: 2022-03-26 | End: 2022-03-26

## 2022-03-26 RX ORDER — APIXABAN 2.5 MG/1
2 TABLET, FILM COATED ORAL
Qty: 24 | Refills: 0
Start: 2022-03-26 | End: 2022-03-31

## 2022-03-26 RX ORDER — HYDROCORTISONE 1 %
1 OINTMENT (GRAM) TOPICAL EVERY 6 HOURS
Refills: 0 | Status: DISCONTINUED | OUTPATIENT
Start: 2022-03-26 | End: 2022-03-31

## 2022-03-26 RX ORDER — APIXABAN 2.5 MG/1
10 TABLET, FILM COATED ORAL EVERY 12 HOURS
Refills: 0 | Status: DISCONTINUED | OUTPATIENT
Start: 2022-03-26 | End: 2022-03-31

## 2022-03-26 RX ORDER — DIPHENHYDRAMINE HCL 50 MG
25 CAPSULE ORAL ONCE
Refills: 0 | Status: COMPLETED | OUTPATIENT
Start: 2022-03-26 | End: 2022-03-26

## 2022-03-26 RX ORDER — APIXABAN 2.5 MG/1
1 TABLET, FILM COATED ORAL
Qty: 60 | Refills: 0
Start: 2022-03-26 | End: 2022-04-24

## 2022-03-26 RX ADMIN — Medication 75 MICROGRAM(S): at 06:25

## 2022-03-26 RX ADMIN — Medication 1 MILLIGRAM(S): at 23:05

## 2022-03-26 RX ADMIN — QUETIAPINE FUMARATE 50 MILLIGRAM(S): 200 TABLET, FILM COATED ORAL at 18:01

## 2022-03-26 RX ADMIN — QUETIAPINE FUMARATE 50 MILLIGRAM(S): 200 TABLET, FILM COATED ORAL at 06:25

## 2022-03-26 RX ADMIN — Medication 25 MILLIGRAM(S): at 21:10

## 2022-03-26 RX ADMIN — MOMETASONE FUROATE 1 PUFF(S): 220 INHALANT RESPIRATORY (INHALATION) at 12:42

## 2022-03-26 RX ADMIN — ARIPIPRAZOLE 30 MILLIGRAM(S): 15 TABLET ORAL at 12:35

## 2022-03-26 RX ADMIN — ENOXAPARIN SODIUM 120 MILLIGRAM(S): 100 INJECTION SUBCUTANEOUS at 06:26

## 2022-03-26 RX ADMIN — Medication 325 MILLIGRAM(S): at 12:34

## 2022-03-26 RX ADMIN — Medication 25 MILLIGRAM(S): at 12:34

## 2022-03-26 RX ADMIN — CYCLOBENZAPRINE HYDROCHLORIDE 5 MILLIGRAM(S): 10 TABLET, FILM COATED ORAL at 12:34

## 2022-03-26 RX ADMIN — DIVALPROEX SODIUM 500 MILLIGRAM(S): 500 TABLET, DELAYED RELEASE ORAL at 18:02

## 2022-03-26 RX ADMIN — FLUCONAZOLE 150 MILLIGRAM(S): 150 TABLET ORAL at 08:57

## 2022-03-26 RX ADMIN — DIVALPROEX SODIUM 500 MILLIGRAM(S): 500 TABLET, DELAYED RELEASE ORAL at 06:25

## 2022-03-26 RX ADMIN — Medication 25 MILLIGRAM(S): at 08:37

## 2022-03-26 RX ADMIN — PANTOPRAZOLE SODIUM 40 MILLIGRAM(S): 20 TABLET, DELAYED RELEASE ORAL at 08:38

## 2022-03-26 RX ADMIN — APIXABAN 10 MILLIGRAM(S): 2.5 TABLET, FILM COATED ORAL at 18:01

## 2022-03-26 RX ADMIN — CYCLOBENZAPRINE HYDROCHLORIDE 5 MILLIGRAM(S): 10 TABLET, FILM COATED ORAL at 19:50

## 2022-03-26 NOTE — CHART NOTE - NSCHARTNOTEFT_GEN_A_CORE
Rapid response called by RN for unresposiveness.  37 y/o obese female with a PMHx of Factor 5 Leiden Deficiency, Seizures, DVT/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  Per RN pt stated that she was having an "aura" then became unresponsive.  When RRT arrived, pt was having seizure.    Vitals: /80, P 101, RR 22,   lethargic, eyes closed  heart; S1S2  lungs : clear  ext: twitching noted predominately upper extremities mostly RUE. No tonic clonic movement noted.    A/P 37 y/o obese female with a PMHx of Factor 5 Leiden Deficiency, Seizure hx on depakote, DVT/PE on Xarelto Endometriosis, Asthma presents to the ED c/o B/L LE edema and SOB x 2 weeks, pt being admitted for acute DVT and likely PE, now with breakthrough seizure.  -Ativan 1 mg IM given  -twitching resolved with above dose-d/w attending at bedside

## 2022-03-26 NOTE — DISCHARGE NOTE PROVIDER - NSDCCPCAREPLAN_GEN_ALL_CORE_FT
PRINCIPAL DISCHARGE DIAGNOSIS  Diagnosis: Acute deep vein thrombosis (DVT)  Assessment and Plan of Treatment: switch to eliquis  take as prescribed   follow up with your doctor       PRINCIPAL DISCHARGE DIAGNOSIS  Diagnosis: Acute deep vein thrombosis (DVT)  Assessment and Plan of Treatment: switch to eliquis  take as prescribed   follow up with your doctor      SECONDARY DISCHARGE DIAGNOSES  Diagnosis: Seizure  Assessment and Plan of Treatment:      PRINCIPAL DISCHARGE DIAGNOSIS  Diagnosis: Acute deep vein thrombosis (DVT)  Assessment and Plan of Treatment: switch to eliquis  take as prescribed   follow up with your doctor      SECONDARY DISCHARGE DIAGNOSES  Diagnosis: Seizure  Assessment and Plan of Treatment: Changed Depakote to 500 in AM, 1000 in PM  Continue with doses as prescribed with strict adherence to regimen  Follow up with your Neurologist     PRINCIPAL DISCHARGE DIAGNOSIS  Diagnosis: Acute deep vein thrombosis (DVT)  Assessment and Plan of Treatment: Take Eliquis orally daily  Continue with 5mg two tabs twice daily until 4/2/22  After 4/2/22, take 5mg PO  twice daily thereafter  Follow up with you doctor      SECONDARY DISCHARGE DIAGNOSES  Diagnosis: Seizure  Assessment and Plan of Treatment: Changed Depakote to 500 in AM, 1000 in PM  Continue with doses as prescribed with strict adherence to regimen  Follow up with your Neurologist     PRINCIPAL DISCHARGE DIAGNOSIS  Diagnosis: Acute deep vein thrombosis (DVT)  Assessment and Plan of Treatment: Take Eliquis orally daily  Continue with 5mg two tabs twice daily until 4/2/22  After 4/2/22, take 5mg PO  twice daily thereafter  Follow up with you doctor      SECONDARY DISCHARGE DIAGNOSES  Diagnosis: Seizure  Assessment and Plan of Treatment: Discontinue 500 and 1000mg doses of Depakote  Start Depakote 1500mg PO QHS  Continue with doses as prescribed with strict adherence to regimen  Follow up with your Neurologist

## 2022-03-26 NOTE — DISCHARGE NOTE PROVIDER - NSDCMRMEDTOKEN_GEN_ALL_CORE_FT
albuterol 90 mcg/inh inhalation aerosol: 2 puff(s) inhaled every 6 hours, As needed, Shortness of Breath and/or Wheezing  ARIPiprazole 30 mg oral tablet: 1 tab(s) orally once a day  divalproex sodium 500 mg oral delayed release tablet: 1 tab(s) orally 2 times a day  Eliquis 5 mg oral tablet: 2 tab(s) orally 2 times a day   Eliquis 5 mg oral tablet: 1 tab(s) orally 2 times a day   FeroSul 325 mg (65 mg elemental iron) oral tablet: 1 tab(s) orally once a day   levothyroxine 75 mcg (0.075 mg) oral tablet: 1 tab(s) orally once a day  lurasidone 40 mg oral tablet: 1 tab(s) orally once a day  mometasone 220 mcg/inh inhalation aerosol powder: 1 puff(s) inhaled once a day  pantoprazole 40 mg oral delayed release tablet: 1 tab(s) orally once a day (before a meal)  QUEtiapine 50 mg oral tablet: 1 tab(s) orally 2 times a day  topiramate 25 mg oral tablet: 1 tab(s) orally once a day   albuterol 90 mcg/inh inhalation aerosol: 2 puff(s) inhaled every 6 hours, As needed, Shortness of Breath and/or Wheezing  ARIPiprazole 30 mg oral tablet: 1 tab(s) orally once a day  brivaracetam 10 mg/mL intravenous solution: 10 milliliter(s) intravenous once, As needed, If another seizure occurs / No more Benzos  diphenhydrAMINE 25 mg oral capsule: 1 cap(s) orally every 6 hours, As needed, Rash and/or Itching  divalproex sodium 500 mg oral delayed release tablet: 1 tab(s) orally 2 times a day  Eliquis 5 mg oral tablet: 2 tab(s) orally 2 times a day   Eliquis 5 mg oral tablet: 1 tab(s) orally 2 times a day   FeroSul 325 mg (65 mg elemental iron) oral tablet: 1 tab(s) orally once a day   lacosamide 200 mg/20 mL intravenous solution: 10 milliliter(s) intravenous every 12 hours  levothyroxine 75 mcg (0.075 mg) oral tablet: 1 tab(s) orally once a day  lurasidone 40 mg oral tablet: 1 tab(s) orally once a day  mometasone 220 mcg/inh inhalation aerosol powder: 1 puff(s) inhaled once a day  pantoprazole 40 mg oral delayed release tablet: 1 tab(s) orally once a day (before a meal)  QUEtiapine 50 mg oral tablet: 1 tab(s) orally 2 times a day  topiramate 25 mg oral tablet: 1 tab(s) orally once a day  valproic acid (as valproate sodium) 100 mg/mL intravenous solution: 5 milliliter(s) intravenous 3 times a day   albuterol 90 mcg/inh inhalation aerosol: 2 puff(s) inhaled every 6 hours, As needed, Shortness of Breath and/or Wheezing  ARIPiprazole 30 mg oral tablet: 1 tab(s) orally once a day  diphenhydrAMINE 25 mg oral capsule: 1 cap(s) orally every 6 hours, As needed, Rash and/or Itching  divalproex sodium 500 mg oral tablet, extended release: 2 tab(s) orally once a day (at bedtime)  divalproex sodium 500 mg oral tablet, extended release: 1 tab(s) orally once a day  Eliquis 5 mg oral tablet: 2 tab(s) orally 2 times a day   Eliquis 5 mg oral tablet: 1 tab(s) orally 2 times a day   FeroSul 325 mg (65 mg elemental iron) oral tablet: 1 tab(s) orally once a day   lacosamide 200 mg/20 mL intravenous solution: 10 milliliter(s) intravenous every 12 hours  levothyroxine 75 mcg (0.075 mg) oral tablet: 1 tab(s) orally once a day  lurasidone 40 mg oral tablet: 1 tab(s) orally once a day  mometasone 220 mcg/inh inhalation aerosol powder: 1 puff(s) inhaled once a day  pantoprazole 40 mg oral delayed release tablet: 1 tab(s) orally once a day (before a meal)  QUEtiapine 50 mg oral tablet: 1 tab(s) orally 2 times a day  topiramate 25 mg oral tablet: 1 tab(s) orally once a day   albuterol 90 mcg/inh inhalation aerosol: 2 puff(s) inhaled every 6 hours, As needed, Shortness of Breath and/or Wheezing  ARIPiprazole 30 mg oral tablet: 1 tab(s) orally once a day  diphenhydrAMINE 25 mg oral capsule: 1 cap(s) orally every 6 hours, As needed, Rash and/or Itching  lacosamide 200 mg/20 mL intravenous solution: 10 milliliter(s) intravenous every 12 hours  levothyroxine 75 mcg (0.075 mg) oral tablet: 1 tab(s) orally once a day  lurasidone 40 mg oral tablet: 1 tab(s) orally once a day  mometasone 220 mcg/inh inhalation aerosol powder: 1 puff(s) inhaled once a day  pantoprazole 40 mg oral delayed release tablet: 1 tab(s) orally once a day (before a meal)  QUEtiapine 50 mg oral tablet: 1 tab(s) orally 2 times a day  topiramate 25 mg oral tablet: 1 tab(s) orally once a day   albuterol 90 mcg/inh inhalation aerosol: 2 puff(s) inhaled every 6 hours, As needed, Shortness of Breath and/or Wheezing  ARIPiprazole 30 mg oral tablet: 1 tab(s) orally once a day  Depakote  mg oral tablet, extended release: 3 tab(s) orally once a day (at bedtime)   diphenhydrAMINE 25 mg oral capsule: 1 cap(s) orally every 6 hours, As needed, Rash and/or Itching  lacosamide 200 mg/20 mL intravenous solution: 10 milliliter(s) intravenous every 12 hours  levothyroxine 75 mcg (0.075 mg) oral tablet: 1 tab(s) orally once a day  lurasidone 40 mg oral tablet: 1 tab(s) orally once a day  mometasone 220 mcg/inh inhalation aerosol powder: 1 puff(s) inhaled once a day  pantoprazole 40 mg oral delayed release tablet: 1 tab(s) orally once a day (before a meal)  QUEtiapine 50 mg oral tablet: 1 tab(s) orally 2 times a day  topiramate 25 mg oral tablet: 1 tab(s) orally once a day   albuterol 90 mcg/inh inhalation aerosol: 2 puff(s) inhaled every 6 hours, As needed, Shortness of Breath and/or Wheezing  apixaban 5 mg oral tablet: 2 tab(s) orally every 12 hours  ARIPiprazole 30 mg oral tablet: 1 tab(s) orally once a day  Depakote  mg oral tablet, extended release: 3 tab(s) orally once a day (at bedtime)   diphenhydrAMINE 25 mg oral capsule: 1 cap(s) orally every 6 hours, As needed, Rash and/or Itching  Eliquis 5 mg oral tablet: 1 tab(s) orally 2 times a day   ferrous sulfate 325 mg (65 mg elemental iron) oral tablet: 1 tab(s) orally once a day  lacosamide 200 mg/20 mL intravenous solution: 10 milliliter(s) intravenous every 12 hours  levothyroxine 75 mcg (0.075 mg) oral tablet: 1 tab(s) orally once a day  lurasidone 40 mg oral tablet: 1 tab(s) orally once a day  mometasone 220 mcg/inh inhalation aerosol powder: 1 puff(s) inhaled once a day  pantoprazole 40 mg intravenous injection: 40 milligram(s) intravenous once a day  QUEtiapine 50 mg oral tablet: 1 tab(s) orally 2 times a day

## 2022-03-26 NOTE — DISCHARGE NOTE PROVIDER - HOSPITAL COURSE
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    1) Acute DVT, no clinical signs of PE  - pt reports compliance w/ Xarelto, no recent activity concerning for provoke clot and likely  - change to eliquis   - Hematology consult appreciated    2) Asthma w acute exacerbation POA  - Pt given IV Solumedrol in ED, no wheezing at this time     Given diflucan for yeast infection    3) Seizure Disorder/hx of bipolar/schizoaffective  - c/w Depakote, home meds  -pt psychiatrically stable at this time, no si/hi, no hallucinations     4) Hypothyroidism  - c/w Synthroid        pt seen and examined 45 min spent on dc planning     Lab test review, Radiology Review, Vitals review, Consultant review and discussion, Physical examination, IDR, Assessment and plan; Plan discussion with patient and family   35 y/o obese female with a PMHx of Factor 5 Leiden Deficiency, Seizures, DVT/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  Patient with multiple breakthrough seizures during hospital course and being transferred to [facility] EMU for further monitoring/ treatment.  Patient was given multiple doses of ativan and loaded with vimpat and valproic acid.    1) Acute DVT, no clinical signs of PE  - pt reports compliance w/ Xarelto, no recent activity concerning for provoke clot and likely  - change to eliquis   - Hematology consult appreciated    2) Asthma w acute exacerbation POA  - Pt given IV Solumedrol in ED, no wheezing at this time     Given diflucan for yeast infection    3) Hypothyroidism  - c/w Synthroid        pt seen and examined 45 min spent on dc planning     Lab test review, Radiology Review, Vitals review, Consultant review and discussion, Physical examination, IDR, Assessment and plan; Plan discussion with patient and family   35 y/o obese female with a PMHx of Factor 5 Leiden Deficiency, Seizures, DVT/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  Patient with multiple breakthrough seizures during hospital course and being transferred to ICU for further monitoring/ treatment. ICU course has been uncomlicated. Patient was given multiple doses of ativan and loaded with vimpat and valproic acid and has not had any more seizure activity since initial episode. Patient seen and examined and is cleared for discharge back to [facility].     1) Acute DVT, no clinical signs of PE  - pt reports compliance w/ Xarelto, no recent activity concerning for provoke clot and likely  - change to eliquis. Tolerating stable dose of Eliquis at this time  - Hematology consult appreciated    2) Seizure Disorder  - Resolved now  - Continue with Valproic Acid as prescribed  - Neuro reccs appreciated    3) Asthma w acute exacerbation POA  - Pt given IV Solumedrol in ED, no wheezing at this time     Given diflucan for yeast infection  4) Hypothyroidism  - c/w Synthroid    pt seen and examined 45 min spent on dc planning   Lab test review, Radiology Review, Vitals review, Consultant review and discussion, Physical examination, IDR, Assessment and plan; Plan discussion with patient and family   37 y/o obese female with a PMHx of Factor 5 Leiden Deficiency, Seizures, DVT/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  Patient with multiple breakthrough seizures during hospital course and being transferred to ICU for further monitoring/ treatment. ICU course has been uncomlicated. Patient was given multiple doses of ativan and loaded with vimpat and valproic acid and has not had any more seizure activity since initial episode. Patient seen and examined and is cleared for discharge back to [facility].     1) Acute DVT, no clinical signs of PE  - pt reports compliance w/ Xarelto, no recent activity concerning for provoke clot and likely  - change to eliquis. Tolerating stable dose of Eliquis at this time  - Hematology consult appreciated    2) Seizure Disorder  - Resolved now  - Continue with Valproic Acid as prescribed  - Patient has been seen up and ambulating from bed to commode. No concerns for inability to ambulate at this time  - Neuro reccs appreciated    3) Asthma w acute exacerbation POA  - Pt given IV Solumedrol in ED, no wheezing at this time     Given diflucan for yeast infection  4) Hypothyroidism  - c/w Synthroid    pt seen and examined 45 min spent on dc planning   Lab test review, Radiology Review, Vitals review, Consultant review and discussion, Physical examination, IDR, Assessment and plan; Plan discussion with patient and family

## 2022-03-26 NOTE — PROGRESS NOTE ADULT - ASSESSMENT
37 y/o obese female with a PMHx of Factor 5 Leiden Deficiency, Seizures, DVT/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    1) Acute DVT, no clinical signs of PE  - pt reports compliance w/ Xarelto, no recent activity concerning for provoke clot and likely  - change to eliquis   - Hematology consult appreciated    2) Asthma w acute exacerbation POA  - Pt given IV Solumedrol in ED, no wheezing at this time     Given diflucan for yeast infection    3) Seizure Disorder/hx of bipolar/schizoaffective  - c/w Depakote, home meds  -pt psychiatrically stable at this time, no si/hi, no hallucinations     4) Hypothyroidism  - c/w Synthroid

## 2022-03-26 NOTE — PROGRESS NOTE ADULT - PROBLEM SELECTOR PLAN 1
- patient with History of Factor V leiden Mutation as per patients history, first she stated she follows with a hematologist in [place], and has been complaint with her mediciations but admitted to not taking with food  - patient started on Lovenox 120mg BID  - patient lives in [facility] and needs to be given her medication  - Psych F/u  - Patient does not think can be compliant with coumadin and considering Psych history may not be reliable to be on coumadin and complaint with INR checks  - can continue Lovenox 1mg/kg BID or consider changing to eliquis

## 2022-03-27 LAB
-  AMPICILLIN: SIGNIFICANT CHANGE UP
-  CIPROFLOXACIN: SIGNIFICANT CHANGE UP
-  LEVOFLOXACIN: SIGNIFICANT CHANGE UP
-  NITROFURANTOIN: SIGNIFICANT CHANGE UP
-  TETRACYCLINE: SIGNIFICANT CHANGE UP
-  VANCOMYCIN: SIGNIFICANT CHANGE UP
ALBUMIN SERPL ELPH-MCNC: 3.9 G/DL — SIGNIFICANT CHANGE UP (ref 3.3–5)
ALP SERPL-CCNC: 56 U/L — SIGNIFICANT CHANGE UP (ref 40–120)
ALT FLD-CCNC: 33 U/L — SIGNIFICANT CHANGE UP (ref 12–78)
ANION GAP SERPL CALC-SCNC: 6 MMOL/L — SIGNIFICANT CHANGE UP (ref 5–17)
AST SERPL-CCNC: 17 U/L — SIGNIFICANT CHANGE UP (ref 15–37)
BILIRUB SERPL-MCNC: 0.2 MG/DL — SIGNIFICANT CHANGE UP (ref 0.2–1.2)
BUN SERPL-MCNC: 19 MG/DL — SIGNIFICANT CHANGE UP (ref 7–23)
CALCIUM SERPL-MCNC: 9.3 MG/DL — SIGNIFICANT CHANGE UP (ref 8.5–10.1)
CHLORIDE SERPL-SCNC: 109 MMOL/L — HIGH (ref 96–108)
CO2 SERPL-SCNC: 26 MMOL/L — SIGNIFICANT CHANGE UP (ref 22–31)
CREAT SERPL-MCNC: 0.98 MG/DL — SIGNIFICANT CHANGE UP (ref 0.5–1.3)
CULTURE RESULTS: SIGNIFICANT CHANGE UP
EGFR: 78 ML/MIN/1.73M2 — SIGNIFICANT CHANGE UP
GLUCOSE SERPL-MCNC: 84 MG/DL — SIGNIFICANT CHANGE UP (ref 70–99)
HCT VFR BLD CALC: 39 % — SIGNIFICANT CHANGE UP (ref 34.5–45)
HGB BLD-MCNC: 12.5 G/DL — SIGNIFICANT CHANGE UP (ref 11.5–15.5)
MCHC RBC-ENTMCNC: 29.8 PG — SIGNIFICANT CHANGE UP (ref 27–34)
MCHC RBC-ENTMCNC: 32.1 G/DL — SIGNIFICANT CHANGE UP (ref 32–36)
MCV RBC AUTO: 92.9 FL — SIGNIFICANT CHANGE UP (ref 80–100)
METHOD TYPE: SIGNIFICANT CHANGE UP
NRBC # BLD: 0 /100 WBCS — SIGNIFICANT CHANGE UP (ref 0–0)
ORGANISM # SPEC MICROSCOPIC CNT: SIGNIFICANT CHANGE UP
ORGANISM # SPEC MICROSCOPIC CNT: SIGNIFICANT CHANGE UP
PLATELET # BLD AUTO: 191 K/UL — SIGNIFICANT CHANGE UP (ref 150–400)
POTASSIUM SERPL-MCNC: 3.7 MMOL/L — SIGNIFICANT CHANGE UP (ref 3.5–5.3)
POTASSIUM SERPL-SCNC: 3.7 MMOL/L — SIGNIFICANT CHANGE UP (ref 3.5–5.3)
PROT SERPL-MCNC: 7.9 GM/DL — SIGNIFICANT CHANGE UP (ref 6–8.3)
RBC # BLD: 4.2 M/UL — SIGNIFICANT CHANGE UP (ref 3.8–5.2)
RBC # FLD: 14.7 % — HIGH (ref 10.3–14.5)
SODIUM SERPL-SCNC: 141 MMOL/L — SIGNIFICANT CHANGE UP (ref 135–145)
SPECIMEN SOURCE: SIGNIFICANT CHANGE UP
VALPROATE SERPL-MCNC: 95 UG/ML — SIGNIFICANT CHANGE UP (ref 50–100)
WBC # BLD: 6.21 K/UL — SIGNIFICANT CHANGE UP (ref 3.8–10.5)
WBC # FLD AUTO: 6.21 K/UL — SIGNIFICANT CHANGE UP (ref 3.8–10.5)

## 2022-03-27 PROCEDURE — 99291 CRITICAL CARE FIRST HOUR: CPT

## 2022-03-27 PROCEDURE — 99233 SBSQ HOSP IP/OBS HIGH 50: CPT

## 2022-03-27 PROCEDURE — ZZZZZ: CPT

## 2022-03-27 RX ORDER — CHLORHEXIDINE GLUCONATE 213 G/1000ML
1 SOLUTION TOPICAL
Refills: 0 | Status: DISCONTINUED | OUTPATIENT
Start: 2022-03-27 | End: 2022-03-31

## 2022-03-27 RX ORDER — BRIVARACETAM 25 MG/1
10 TABLET, FILM COATED ORAL
Qty: 0 | Refills: 0 | DISCHARGE
Start: 2022-03-27

## 2022-03-27 RX ORDER — BRIVARACETAM 25 MG/1
100 TABLET, FILM COATED ORAL ONCE
Refills: 0 | Status: DISCONTINUED | OUTPATIENT
Start: 2022-03-27 | End: 2022-03-27

## 2022-03-27 RX ORDER — LACOSAMIDE 50 MG/1
100 TABLET ORAL
Refills: 0 | Status: DISCONTINUED | OUTPATIENT
Start: 2022-03-27 | End: 2022-03-31

## 2022-03-27 RX ORDER — LEVETIRACETAM 250 MG/1
1000 TABLET, FILM COATED ORAL ONCE
Refills: 0 | Status: DISCONTINUED | OUTPATIENT
Start: 2022-03-27 | End: 2022-03-27

## 2022-03-27 RX ORDER — DIPHENHYDRAMINE HCL 50 MG
25 CAPSULE ORAL EVERY 6 HOURS
Refills: 0 | Status: DISCONTINUED | OUTPATIENT
Start: 2022-03-27 | End: 2022-03-27

## 2022-03-27 RX ORDER — LACOSAMIDE 50 MG/1
10 TABLET ORAL
Qty: 0 | Refills: 0 | DISCHARGE
Start: 2022-03-27

## 2022-03-27 RX ORDER — DIPHENHYDRAMINE HCL 50 MG
1 CAPSULE ORAL
Qty: 0 | Refills: 0 | DISCHARGE
Start: 2022-03-27

## 2022-03-27 RX ORDER — VALPROIC ACID (AS SODIUM SALT) 250 MG/5ML
500 SOLUTION, ORAL ORAL ONCE
Refills: 0 | Status: COMPLETED | OUTPATIENT
Start: 2022-03-27 | End: 2022-03-27

## 2022-03-27 RX ORDER — VALPROIC ACID (AS SODIUM SALT) 250 MG/5ML
5 SOLUTION, ORAL ORAL
Qty: 0 | Refills: 0 | DISCHARGE
Start: 2022-03-27

## 2022-03-27 RX ORDER — LACOSAMIDE 50 MG/1
200 TABLET ORAL ONCE
Refills: 0 | Status: DISCONTINUED | OUTPATIENT
Start: 2022-03-27 | End: 2022-03-27

## 2022-03-27 RX ORDER — VALPROIC ACID (AS SODIUM SALT) 250 MG/5ML
500 SOLUTION, ORAL ORAL THREE TIMES A DAY
Refills: 0 | Status: DISCONTINUED | OUTPATIENT
Start: 2022-03-27 | End: 2022-03-30

## 2022-03-27 RX ORDER — ARIPIPRAZOLE 15 MG/1
30 TABLET ORAL DAILY
Refills: 0 | Status: DISCONTINUED | OUTPATIENT
Start: 2022-03-27 | End: 2022-03-31

## 2022-03-27 RX ORDER — DEXMEDETOMIDINE HYDROCHLORIDE IN 0.9% SODIUM CHLORIDE 4 UG/ML
0.2 INJECTION INTRAVENOUS
Qty: 200 | Refills: 0 | Status: DISCONTINUED | OUTPATIENT
Start: 2022-03-27 | End: 2022-03-28

## 2022-03-27 RX ADMIN — QUETIAPINE FUMARATE 50 MILLIGRAM(S): 200 TABLET, FILM COATED ORAL at 05:39

## 2022-03-27 RX ADMIN — CYCLOBENZAPRINE HYDROCHLORIDE 5 MILLIGRAM(S): 10 TABLET, FILM COATED ORAL at 08:34

## 2022-03-27 RX ADMIN — Medication 25 MILLIGRAM(S): at 13:35

## 2022-03-27 RX ADMIN — Medication 2 MILLIGRAM(S): at 15:05

## 2022-03-27 RX ADMIN — DIVALPROEX SODIUM 500 MILLIGRAM(S): 500 TABLET, DELAYED RELEASE ORAL at 05:40

## 2022-03-27 RX ADMIN — Medication 2 MILLIGRAM(S): at 13:00

## 2022-03-27 RX ADMIN — ARIPIPRAZOLE 30 MILLIGRAM(S): 15 TABLET ORAL at 13:34

## 2022-03-27 RX ADMIN — DEXMEDETOMIDINE HYDROCHLORIDE IN 0.9% SODIUM CHLORIDE 5.85 MICROGRAM(S)/KG/HR: 4 INJECTION INTRAVENOUS at 21:38

## 2022-03-27 RX ADMIN — Medication 2 MILLIGRAM(S): at 13:10

## 2022-03-27 RX ADMIN — Medication 75 MICROGRAM(S): at 05:39

## 2022-03-27 RX ADMIN — Medication 325 MILLIGRAM(S): at 13:56

## 2022-03-27 RX ADMIN — QUETIAPINE FUMARATE 50 MILLIGRAM(S): 200 TABLET, FILM COATED ORAL at 18:48

## 2022-03-27 RX ADMIN — APIXABAN 10 MILLIGRAM(S): 2.5 TABLET, FILM COATED ORAL at 05:40

## 2022-03-27 RX ADMIN — LURASIDONE HYDROCHLORIDE 40 MILLIGRAM(S): 40 TABLET ORAL at 13:34

## 2022-03-27 RX ADMIN — APIXABAN 10 MILLIGRAM(S): 2.5 TABLET, FILM COATED ORAL at 18:44

## 2022-03-27 RX ADMIN — LACOSAMIDE 120 MILLIGRAM(S): 50 TABLET ORAL at 23:56

## 2022-03-27 RX ADMIN — Medication 25 MILLIGRAM(S): at 11:17

## 2022-03-27 RX ADMIN — PANTOPRAZOLE SODIUM 40 MILLIGRAM(S): 20 TABLET, DELAYED RELEASE ORAL at 08:12

## 2022-03-27 RX ADMIN — LACOSAMIDE 200 MILLIGRAM(S): 50 TABLET ORAL at 18:50

## 2022-03-27 RX ADMIN — Medication 2 MILLIGRAM(S): at 15:00

## 2022-03-27 RX ADMIN — Medication 55 MILLIGRAM(S): at 17:41

## 2022-03-27 RX ADMIN — Medication 650 MILLIGRAM(S): at 13:33

## 2022-03-27 RX ADMIN — CHLORHEXIDINE GLUCONATE 1 APPLICATION(S): 213 SOLUTION TOPICAL at 21:34

## 2022-03-27 RX ADMIN — Medication 55 MILLIGRAM(S): at 14:30

## 2022-03-27 RX ADMIN — LEVETIRACETAM 1000 MILLIGRAM(S): 250 TABLET, FILM COATED ORAL at 20:03

## 2022-03-27 RX ADMIN — MOMETASONE FUROATE 1 PUFF(S): 220 INHALANT RESPIRATORY (INHALATION) at 13:33

## 2022-03-27 RX ADMIN — Medication 55 MILLIGRAM(S): at 22:01

## 2022-03-27 RX ADMIN — Medication 650 MILLIGRAM(S): at 14:33

## 2022-03-27 NOTE — PROVIDER CONTACT NOTE (EICU) - RECOMMENDATIONS
transfer to ICU for closer monitoring  plan to transfer to EMU when bed available for MRI and 24 hr eeg  PA spoke to Neuro to manage AEP  a/c as per heme onc

## 2022-03-27 NOTE — RAPID RESPONSE TEAM SUMMARY - NSADDTLFINDINGSRRT_GEN_ALL_CORE
BP 97/10    RR 18  T 37  O2 sat 98%   mg/dL  General:  unresponsive, eyes closed, noted to have jerking of b/l upper and lower extremities, R > L  Lungs: CTAB  Cardiac: Clear S1, S2  Abdomen: soft, NT  
/70    RR 18  O2 sat 100%  FS 90 mg/dL  General: unresponsive,  noted to have jerking of b/l upper and lower extremities, R > L  Lungs: CTAB  Cardiac: + S1, S2
/72    RR 22  O2 sat 100%    General: unresponsive,  noted to have jerking of b/l upper and lower extremities, R > L  Lungs: CTAB  Cardiac: + S1, S2

## 2022-03-27 NOTE — PROVIDER CONTACT NOTE (EICU) - BACKGROUND
36 year old female with factor V Leiden with dvt/pe  , h/o seizure d/o , Schizo-affective d/o vs bipolar, who p/w Seizure  Has had multiple RRT for seizure, and transferred to ICU for closer monitoring  Pending transfer to EMU in [facility] when bed is available.

## 2022-03-27 NOTE — PROVIDER CONTACT NOTE (EICU) - SITUATION
eICU consult  Provider Location: [facility] center @ [facility].   Patient Location: [facility] ICU

## 2022-03-27 NOTE — CONSULT NOTE ADULT - ASSESSMENT
Status epilepticus vs. non-epileptic spells  DVT/PE  Factor V Leiden deficiency  Depression    - agree with transfer to EMU  - will need head imaging (CT head or MRI brain wwo diane)  - continue current seizure medications  - will defer to EMU team for further management.     Thank you for the consult.

## 2022-03-27 NOTE — RAPID RESPONSE TEAM SUMMARY - NSMEDICATIONSRRT_GEN_ALL_CORE
Ativan 2 mg IVP x 1 given with resolution of symptoms  Will call for possible transfer to [facility] EMU  Seizure /fall / aspiration precaution  Dr Patel & ICU team present throughout course of RRT
Ativan 2 mg x 1 IM given with resolution of seizure like activity and patient appeared to be in post-ictal state  At ~13:20 patient again noted to become unresponsive with jerking movement of b/l upper & lower extremities; patient given an additional 2 mg IV ativan with resolution of activity; ~5 min later patient slowly able to open eyes and follow simple commands but remained lethargic
Ativan 2 mg IVP x 1 given with resolution of symptoms  Valproic acid 500 mg IVPB x 1  Seizure /fall / aspiration precaution  Neurology consult pending  Dr Patel present throughout course of RRT

## 2022-03-27 NOTE — PROGRESS NOTE ADULT - ASSESSMENT
35yo obese F with PMH of asthma, factor V Leiden Deficiency, DVT/PE (on Xarelto), seizure d/o, endometriosis, hypothyroid and extensive psych hx: (Schizoaffective disorder vs Bipolar disorder (vs other mood/psychotic disorders), Personality disorders, Intellectual disability, Cocaine use disorder) presented to the ER c/o b/l LE edema and SOB x 2 weeks. Patient was admitted to medicine on 3/25 for L popliteal DVT.   Hospital course c/b multiple RRTs for seizures. ICU consulted earlier today for breakthough seizures. Case discussed with epilepsy at [facility], pt accepted for transfer to EMU at [facility] for closer monitoring. RRT this evening for seizure while pt agitated and attempting to walk out of room. Given no bed availability at [facility], pt transferred to ICU for closer monitoring; plan for likely transfer to EMU in am.     -Neuro: Multiple breakthrough seizures (five RRTs today) despite multiple AEDs; psychogenic vs epileptic seizures? Neurology and epilepsy consulted; antiepileptic regimen modified per reccs. Will hold off on further benzodiazepines as pt appears to be benzo-resistant. Per epilepsy attending Dr. Miller, if actively seizing may give depakote 500mg IVP over 5 minutes. Last valproate level likely falsely elevated and not reflective of true level; will repeat with next blood draw. Per floor staff, pt agitated and is a flight risk. Precedex gtt for agitation if needed.   -Cardio: Hemodynamically stable. Maintain MAP>65.   -Resp: Protecting airway, saturating well on RA. Continue albuterol and mometasone inhalers.  -GI: No acute issues.   -Renal: No acute issues. Monitor UO, trend BUN/Cr.   -ID: No active s/s of infection; afebrile, normal WBC. Treated with fluconazole for vaginal candidiasis.  -Heme: L popliteal DVT, known factor V Leiden deficiency. Likely failed rivaroxaban (pt reports compliance), has now been transitioned from full-dose lovenox to apixaban. Trend H/H, monitor for signs of bleeding. Heme/onc following.   -Endocrine: Monitor blood glucose. Continue levothyroxine.  -Psych: Continue aripiprazole, lurasidone, quetiapine.   -Dispo: Transfer to ICU for closer monitoring    Discussed with eICU attending Dr. Dela Cruz, as well as [facility] epilepsy attending Dr. Miller and neurology Dr. Dominique.

## 2022-03-27 NOTE — CONSULT NOTE ADULT - ASSESSMENT
This is a 35 yo obese F with h/o asthma, actor 5 Leiden Deficiency, DVT/PE on Xarelto, Sz d/o, endometriosis and extensive psych hx: (Schizoaffective disorder vs Bipolar disorder (vs other mood/psychotic disorders), Personality disorders, Intellectual disability, Cocaine use disorder) presented to the ER c/o b/l LE edema and SOB x 2 weeks. Patient was admitted for DVT. Patient was consulted for multiple seizures with breakthrough.    Neuro: EMU transfer for [facility]. If patient has one more seizure, patient can be transferred to ICU. continue patient's antiepileptic medication. Neurology is onboard  Resp: patient is breathing comfortably. Sat 100% on 2L NC  [facility]: patient on apixiban for her DVT. 101/72 hr 94  GI: continue patient's diet  Renal: trend electrolytes. replete electrolytes f needed    This is a 35 yo obese F with h/o asthma, actor 5 Leiden Deficiency, DVT/PE on Xarelto, Sz d/o, endometriosis and extensive psych hx: (Schizoaffective disorder vs Bipolar disorder (vs other mood/psychotic disorders), Personality disorders, Intellectual disability, Cocaine use disorder) presented to the ER c/o b/l LE edema and SOB x 2 weeks. Patient was admitted for DVT. Patient was consulted for multiple seizures with breakthrough.    Neuro: EMU transfer for [facility]. Continue patient's antiepileptic medication. adjust antiepileptic per neuro recommendation if necessary. Neurology is onboard  Resp: patient is breathing comfortably. Sat 100% on 2L NC  [facility]: patient on apixiban for her DVT. 101/72 hr 94  GI: continue patient's diet. continue pantoprazole.   Renal: trend electrolytes. replete electrolytes if needed   DVT: on apixiban for DVT    Currently pt is hemodynamically stable and protecting her airway. s/p IV Ativan + cont pt's AEDs. F/u recommendations as per Neuro and consult for transfer to EMU at [facility]. If no bed available pt may be transferred here to the ICU. Please reconsult if patient's  status changed or worsen.

## 2022-03-27 NOTE — RAPID RESPONSE TEAM SUMMARY - NSOTHERINTERVENTIONSRRT_GEN_ALL_CORE
IV placed via ultrasound L AC  Neuro consult called already and will see patient today  Cont current AED pending neuro recommendations  Seizure / fall / aspiration precautions  Labs: CBC, CMP, valproic acid level  Dr Patel present throughout duration of RRT

## 2022-03-27 NOTE — CONSULT NOTE ADULT - SUBJECTIVE AND OBJECTIVE BOX
This is a 37 yo obese F with h/o asthma, actor 5 Leiden Deficiency, DVT/PE on Xarelto, Sz d/o, endometriosis and extensive psych hx: (Schizoaffective disorder vs Bipolar disorder (vs other mood/psychotic disorders), Personality disorders, Intellectual disability, Cocaine use disorder) presented to the ER c/o b/l LE edema and SOB x 2 weeks; CT of chest was negative for PE. Patient's ultrasound was positive for deep venous thrombosis in the left popliteal vein, at the knee. Pt admitted for acute DVT and likely PE. Patient last night had one seizure, which resolved with 1 mg ativan. Today, patient was noted to have three more additional seizures, which resolved with 2 mg ativan. Patient has a total of 7 mg ativan and 500 mg IVPB Valproate. ICU was consulted for patient with multiple break through seizure. ACP spoke with patient. Patient admits to having 7 seizure per day when stressed. Seeing patientCCM consulted today 2 to multiple breakthrough Sz; currently pt is hemodynamically stable and protecting her airway. s/p IV Ativan + cont pt's AEDs.  This is a 37 yo obese F with h/o asthma, actor 5 Leiden Deficiency, DVT/PE on Xarelto, Sz d/o, endometriosis and extensive psych hx: (Schizoaffective disorder vs Bipolar disorder (vs other mood/psychotic disorders), Personality disorders, Intellectual disability, Cocaine use disorder) presented to the ER c/o b/l LE edema and SOB x 2 weeks; CT of chest was negative for PE. Patient's ultrasound was positive for deep venous thrombosis in the left popliteal vein, at the knee. Pt admitted for acute DVT and likely PE. Patient last night had one seizure, which resolved with 1 mg ativan. Today, patient was noted to have three more additional seizures, which resolved with 2 mg ativan. Patient has a total of 7 mg ativan and 500 mg IVPB Valproate. ICU was consulted for patient with multiple break through seizure. ACP spoke with patient. Patient admits to having 7 seizure per day when stressed. Seeing patient's previous chart, patient was previously seen with a neurologist. Per neurologist, patient was previously noncompliant with her seizure med. Patient has also been known to withhold information with her past medical history. Given her valproic level are within therapeutic level. Patient may need increased in her seizure medicine.     T(C): 36.9 (03-27-22 @ 11:34), Max: 37.2 (03-27-22 @ 05:14)  HR: 94 (03-27-22 @ 11:34) (67 - 94)  BP: 101/72 (03-27-22 @ 11:34) (101/72 - 118/81)  RR: 16 (03-27-22 @ 11:34) (16 - 17)  SpO2: 97% (03-27-22 @ 11:34) (97% - 100%)    CONSTITUTIONAL: Well groomed, no apparent distress  EYES: PERRLA and symmetric, EOMI,   RESPIRATORY: No respiratory distress, no use of accessory muscles; CTA b/l, no wheezes, rales or rhonchi,   CARDIOVASCULAR: RRRR, +S1S2, no murmurs  GASTROINTESTINAL: Soft, non tender, non distended, no rebound, no guarding;  SKIN: No rashes or ulcers noted; no subcutaneous nodules or induration palpable

## 2022-03-27 NOTE — CONSULT NOTE ADULT - SUBJECTIVE AND OBJECTIVE BOX
HPI: 36 year old woman with hx of Factor V Leiden deficiency DVT/PE, and Depression admitted for DVT treatment. Patient post-ictal/post-ativan and able to give me brief history. She states she has had seizures since she was born. Cannot give specific antiepileptic medication history. As per chart, she was noted to be on Keppra. It also appears she is not compliant with other medications. She states she does not have a neurologist and gets all her medications from the hospital. As per chart, she had multiple witnessed convulsions and was given Ativan multiple times. Loaded with Depakote, Briviact, and Vimpat.     PMHx: ?seizure disorder since birth, DVT/PE, Factor V Leiden deficiency, Depression  PSHx: "throat surgery"  FHx: unable to obtain due to AMS  Social Hx: non-smoker, no etoh  Meds: See EMR  Allergies: latex, morphine, PCN, toradol, zofran  ROS: seizures    Vitals: Temp 98.5F    RR 16    HR 94    /72  General: NAD  Neuro Exam: Drowsy/lethargic. Opens eyes to voice. States her name, month, and year. Mild psychomotor slowing. No facial droop. BRIA. VFF. Moving all four extremities with generalized weakness. Reflexes symmetric and toes down. Gait exam deferred.     NIHSS- 5    CT head, MRI brain (3/2/22), and labs reviewed

## 2022-03-27 NOTE — CHART NOTE - NSCHARTNOTEFT_GEN_A_CORE
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 Pt reports she noted LE edema and redness around R ankle; as well as JOSE,; she was seen in this ED 2 weeks ago and Rx keflex for cellulitis at that time with minimal improvement. Pt also reports mild dry cough and chest pain w/ SOB for which she used her albuterol pump w/o improvement. Pt also reports feeling "hot and cold" at times. Of note pt reports she had been compliant with her Xarelto.            T(F): 98.2 (03-27-22 @ 17:01), Max: 98.9 (03-27-22 @ 05:14)     HR: 97 (03-27-22 @ 17:01) (67 - 97)     BP: 113/72 (03-27-22 @ 17:01) (101/72 - 118/81)     RR: 17 (03-27-22 @ 17:01) (16 - 17)     SpO2: 96% (03-27-22 @ 17:01) (96% - 100%)           PHYSICAL EXAM:     Patient sitting in chair in hallway     Neuro:  unresponsive, eyes closed, No myoclonic or jerking movements noted     CV: +S1+S2 regular rate and rhythm     Lung: clear to ausculation bilaterally, respirations nonlabored, good inspiratory effort     Abdomen: soft, NTND.          LABS:                       12.5    6.21  )-----------( 191      ( 27 Mar 2022 13:21 )              39.0          03-27       141  |  109<H>  |  19   ----------------------------<  84   3.7   |  26  |  0.98         Ca    9.3      27 Mar 2022 13:21         TPro  7.9  /  Alb  3.9  /  TBili  0.2  /  DBili  x   /  AST  17  /  ALT  33  /  AlkPhos  56  03-27          Assessment/ Plan:       35yo obese F with PMH of asthma, factor V Leiden Deficiency, DVT/PE (on Xarelto), seizure d/o, endometriosis, hypothyroid and extensive psych hx: (Schizoaffective disorder vs Bipolar disorder (vs other mood/psychotic disorders), Personality disorders, Intellectual disability, Cocaine use disorder) presented to [facility] ED on 3/25 with B/L LE edema and SOB x 2 weeks, edema and redness around R ankle, and JOSE. Patient was seen at [facility] ED 2 weeks prior to admission and was given Keflex for cellulitis, patient reports minimal improvement. Patient was found to have an acute DVT and was admitted to medicine. Hospital course complicated by multiple seizures requiring ativan. RRT called for another seizure. Arrive to RRT at 19:51 patient unresponsive w/out any jerking movements. Staff states that the patient did not fall but was placed in chair when her knees began to buckle BP: 119/86, HR:116, SpO2: 97% on 3L nc, Temp: 98.4, blood glucose: 81. After approximately 10 minutes the patient began to arouse, was able to speak but was lethargic and repeatedly asked the same question. 1,000mg Keppra IVP given. NO Ativan given as per recommendation from EMU at [facility]. EMU had suggested Brivaracetam but it is not available at this facility. Dr. Pearson and ICU PA present during RRT. Patient to be transferred to ICU for closer monitoring.

## 2022-03-27 NOTE — PROGRESS NOTE ADULT - ASSESSMENT
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        Patient with Multiple  Tonic Clonic Seizure episodes/RRT requiring IVP of Ativan  Discussed case w Dr Dominique  Discussed Case w [facility] who accepted patient for further monitoring    Give Stat dose of IV depakote 500mg followed by 200mg Vimpat  Start 500mg IV depakote tid  100mg Vimpat BID    DO NOT give anymore benzodiazepine as per epilepsy Attending Dr Miller  If patient seizes again give 100mg IVP of Brivaracetam , can repeat one more time      1) Acute DVT, no clinical signs of PE  - pt reports compliance w/ Xarelto, no recent activity concerning for provoke clot and likely  - change to eliquis   - Hematology consult appreciated    2) Asthma w acute exacerbation POA  - Pt given IV Solumedrol in ED, no wheezing at this time     Given diflucan for yeast infection    3) Seizure Disorder/hx of bipolar/schizoaffective  - c/w Depakote, home meds  -pt psychiatrically stable at this time, no si/hi, no hallucinations     4) Hypothyroidism  - c/w Synthroid

## 2022-03-27 NOTE — PROGRESS NOTE ADULT - CRITICAL CARE ATTENDING COMMENT
Lab test review, Radiology Review, Vitals review, Consultant review and discussion, Physical examination, IDR, Assessment and plan; Plan discussion with patient and family     Critical care provided during RRT

## 2022-03-27 NOTE — CONSULT NOTE ADULT - CRITICAL CARE ATTENDING COMMENT
Pt is a 35 yo obese F with h/o asthma, actor 5 Leiden Deficiency, DVT/PE on Xarelto, Sz d/o, endometriosis and extensive psych hx: (Schizoaffective disorder vs Bipolar disorder (vs other mood/psychotic disorders), Personality disorders, Intellectual disability, Cocaine use disorder) presented to the ER c/o b/l LE edema and SOB x 2 weeks; pt admitted for acute DVT and likely PE. [facility] consulted today 2 to multiple breakthrough Sz; currently pt is hemodynamically stable and protecting her airway. s/p IV Ativan + cont pt's AEDs. F/u recommendations as per Neuro and consult for transfer to EMU at [facility]. If no bed available pt may be transferred here to the ICU. Plan discussed with the bedside team

## 2022-03-27 NOTE — RAPID RESPONSE TEAM SUMMARY - NSSITUATIONBACKGROUNDRRT_GEN_ALL_CORE
RRT called for another episode of unresponsiveness with jerking movement of b/l upper and lower extremities
RRT called for another episode of unresponsiveness with jerking movement of b/l upper and lower extremities
Rapid response called by RN for unresponsiveness.  35 y/o obese female with a PMHx of Factor 5 Leiden Deficiency, Seizures, DVT/PE on Xarelto Endometriosis, Asthma presents to the ED c/o B/L LE edema and SOB x 2 weeks and admitted for acute DVT and likely PE.  RN states she came in room to medicate patient and noted patient to be unresponsive with jerking movement of b/l upper / lower extremities.

## 2022-03-28 LAB
ALBUMIN SERPL ELPH-MCNC: 3.1 G/DL — LOW (ref 3.3–5)
ALP SERPL-CCNC: 44 U/L — SIGNIFICANT CHANGE UP (ref 40–120)
ALT FLD-CCNC: 37 U/L — SIGNIFICANT CHANGE UP (ref 12–78)
ANION GAP SERPL CALC-SCNC: 7 MMOL/L — SIGNIFICANT CHANGE UP (ref 5–17)
AST SERPL-CCNC: 20 U/L — SIGNIFICANT CHANGE UP (ref 15–37)
BASOPHILS # BLD AUTO: 0.02 K/UL — SIGNIFICANT CHANGE UP (ref 0–0.2)
BASOPHILS NFR BLD AUTO: 0.3 % — SIGNIFICANT CHANGE UP (ref 0–2)
BILIRUB SERPL-MCNC: 0.2 MG/DL — SIGNIFICANT CHANGE UP (ref 0.2–1.2)
BUN SERPL-MCNC: 13 MG/DL — SIGNIFICANT CHANGE UP (ref 7–23)
CALCIUM SERPL-MCNC: 9.1 MG/DL — SIGNIFICANT CHANGE UP (ref 8.5–10.1)
CHLORIDE SERPL-SCNC: 110 MMOL/L — HIGH (ref 96–108)
CO2 SERPL-SCNC: 22 MMOL/L — SIGNIFICANT CHANGE UP (ref 22–31)
CREAT SERPL-MCNC: 0.88 MG/DL — SIGNIFICANT CHANGE UP (ref 0.5–1.3)
EGFR: 88 ML/MIN/1.73M2 — SIGNIFICANT CHANGE UP
EOSINOPHIL # BLD AUTO: 0.01 K/UL — SIGNIFICANT CHANGE UP (ref 0–0.5)
EOSINOPHIL NFR BLD AUTO: 0.2 % — SIGNIFICANT CHANGE UP (ref 0–6)
GLUCOSE BLDC GLUCOMTR-MCNC: 98 MG/DL — SIGNIFICANT CHANGE UP (ref 70–99)
GLUCOSE SERPL-MCNC: 90 MG/DL — SIGNIFICANT CHANGE UP (ref 70–99)
HCT VFR BLD CALC: 36.3 % — SIGNIFICANT CHANGE UP (ref 34.5–45)
HGB BLD-MCNC: 11.5 G/DL — SIGNIFICANT CHANGE UP (ref 11.5–15.5)
IMM GRANULOCYTES NFR BLD AUTO: 0.2 % — SIGNIFICANT CHANGE UP (ref 0–1.5)
LYMPHOCYTES # BLD AUTO: 2.59 K/UL — SIGNIFICANT CHANGE UP (ref 1–3.3)
LYMPHOCYTES # BLD AUTO: 43.2 % — SIGNIFICANT CHANGE UP (ref 13–44)
MAGNESIUM SERPL-MCNC: 2.3 MG/DL — SIGNIFICANT CHANGE UP (ref 1.6–2.6)
MCHC RBC-ENTMCNC: 29.5 PG — SIGNIFICANT CHANGE UP (ref 27–34)
MCHC RBC-ENTMCNC: 31.7 G/DL — LOW (ref 32–36)
MCV RBC AUTO: 93.1 FL — SIGNIFICANT CHANGE UP (ref 80–100)
MONOCYTES # BLD AUTO: 0.49 K/UL — SIGNIFICANT CHANGE UP (ref 0–0.9)
MONOCYTES NFR BLD AUTO: 8.2 % — SIGNIFICANT CHANGE UP (ref 2–14)
NEUTROPHILS # BLD AUTO: 2.87 K/UL — SIGNIFICANT CHANGE UP (ref 1.8–7.4)
NEUTROPHILS NFR BLD AUTO: 47.9 % — SIGNIFICANT CHANGE UP (ref 43–77)
NRBC # BLD: 0 /100 WBCS — SIGNIFICANT CHANGE UP (ref 0–0)
PHOSPHATE SERPL-MCNC: 3.3 MG/DL — SIGNIFICANT CHANGE UP (ref 2.5–4.5)
PLATELET # BLD AUTO: 167 K/UL — SIGNIFICANT CHANGE UP (ref 150–400)
POTASSIUM SERPL-MCNC: 4 MMOL/L — SIGNIFICANT CHANGE UP (ref 3.5–5.3)
POTASSIUM SERPL-SCNC: 4 MMOL/L — SIGNIFICANT CHANGE UP (ref 3.5–5.3)
PROT SERPL-MCNC: 6.7 GM/DL — SIGNIFICANT CHANGE UP (ref 6–8.3)
RBC # BLD: 3.9 M/UL — SIGNIFICANT CHANGE UP (ref 3.8–5.2)
RBC # FLD: 14.8 % — HIGH (ref 10.3–14.5)
SODIUM SERPL-SCNC: 139 MMOL/L — SIGNIFICANT CHANGE UP (ref 135–145)
VALPROATE SERPL-MCNC: 107 UG/ML — HIGH (ref 50–100)
WBC # BLD: 5.99 K/UL — SIGNIFICANT CHANGE UP (ref 3.8–10.5)
WBC # FLD AUTO: 5.99 K/UL — SIGNIFICANT CHANGE UP (ref 3.8–10.5)

## 2022-03-28 PROCEDURE — 99291 CRITICAL CARE FIRST HOUR: CPT

## 2022-03-28 RX ORDER — HALOPERIDOL DECANOATE 100 MG/ML
5 INJECTION INTRAMUSCULAR ONCE
Refills: 0 | Status: COMPLETED | OUTPATIENT
Start: 2022-03-28 | End: 2022-03-28

## 2022-03-28 RX ORDER — DEXMEDETOMIDINE HYDROCHLORIDE IN 0.9% SODIUM CHLORIDE 4 UG/ML
0.2 INJECTION INTRAVENOUS
Qty: 200 | Refills: 0 | Status: DISCONTINUED | OUTPATIENT
Start: 2022-03-28 | End: 2022-03-30

## 2022-03-28 RX ORDER — SODIUM CHLORIDE 9 MG/ML
1000 INJECTION, SOLUTION INTRAVENOUS
Refills: 0 | Status: DISCONTINUED | OUTPATIENT
Start: 2022-03-28 | End: 2022-03-30

## 2022-03-28 RX ORDER — DIPHENHYDRAMINE HCL 50 MG
25 CAPSULE ORAL EVERY 6 HOURS
Refills: 0 | Status: DISCONTINUED | OUTPATIENT
Start: 2022-03-28 | End: 2022-03-30

## 2022-03-28 RX ORDER — HALOPERIDOL DECANOATE 100 MG/ML
5 INJECTION INTRAMUSCULAR EVERY 6 HOURS
Refills: 0 | Status: DISCONTINUED | OUTPATIENT
Start: 2022-03-28 | End: 2022-03-29

## 2022-03-28 RX ORDER — MIDAZOLAM HYDROCHLORIDE 1 MG/ML
2 INJECTION, SOLUTION INTRAMUSCULAR; INTRAVENOUS ONCE
Refills: 0 | Status: DISCONTINUED | OUTPATIENT
Start: 2022-03-28 | End: 2022-03-28

## 2022-03-28 RX ORDER — DIPHENHYDRAMINE HCL 50 MG
50 CAPSULE ORAL ONCE
Refills: 0 | Status: COMPLETED | OUTPATIENT
Start: 2022-03-28 | End: 2022-03-28

## 2022-03-28 RX ADMIN — QUETIAPINE FUMARATE 50 MILLIGRAM(S): 200 TABLET, FILM COATED ORAL at 05:59

## 2022-03-28 RX ADMIN — Medication 55 MILLIGRAM(S): at 05:58

## 2022-03-28 RX ADMIN — ARIPIPRAZOLE 30 MILLIGRAM(S): 15 TABLET ORAL at 16:04

## 2022-03-28 RX ADMIN — MIDAZOLAM HYDROCHLORIDE 2 MILLIGRAM(S): 1 INJECTION, SOLUTION INTRAMUSCULAR; INTRAVENOUS at 10:04

## 2022-03-28 RX ADMIN — Medication 55 MILLIGRAM(S): at 21:07

## 2022-03-28 RX ADMIN — Medication 325 MILLIGRAM(S): at 16:05

## 2022-03-28 RX ADMIN — MIDAZOLAM HYDROCHLORIDE 2 MILLIGRAM(S): 1 INJECTION, SOLUTION INTRAMUSCULAR; INTRAVENOUS at 18:32

## 2022-03-28 RX ADMIN — QUETIAPINE FUMARATE 50 MILLIGRAM(S): 200 TABLET, FILM COATED ORAL at 20:06

## 2022-03-28 RX ADMIN — LACOSAMIDE 120 MILLIGRAM(S): 50 TABLET ORAL at 12:05

## 2022-03-28 RX ADMIN — DEXMEDETOMIDINE HYDROCHLORIDE IN 0.9% SODIUM CHLORIDE 5.85 MICROGRAM(S)/KG/HR: 4 INJECTION INTRAVENOUS at 11:30

## 2022-03-28 RX ADMIN — Medication 50 MILLIGRAM(S): at 23:15

## 2022-03-28 RX ADMIN — Medication 55 MILLIGRAM(S): at 14:42

## 2022-03-28 RX ADMIN — DEXMEDETOMIDINE HYDROCHLORIDE IN 0.9% SODIUM CHLORIDE 5.85 MICROGRAM(S)/KG/HR: 4 INJECTION INTRAVENOUS at 10:13

## 2022-03-28 RX ADMIN — Medication 25 MILLIGRAM(S): at 16:06

## 2022-03-28 RX ADMIN — PANTOPRAZOLE SODIUM 40 MILLIGRAM(S): 20 TABLET, DELAYED RELEASE ORAL at 06:51

## 2022-03-28 RX ADMIN — Medication 1 APPLICATION(S): at 06:10

## 2022-03-28 RX ADMIN — LACOSAMIDE 120 MILLIGRAM(S): 50 TABLET ORAL at 23:52

## 2022-03-28 RX ADMIN — Medication 25 MILLIGRAM(S): at 06:49

## 2022-03-28 RX ADMIN — DEXMEDETOMIDINE HYDROCHLORIDE IN 0.9% SODIUM CHLORIDE 5.85 MICROGRAM(S)/KG/HR: 4 INJECTION INTRAVENOUS at 23:13

## 2022-03-28 RX ADMIN — APIXABAN 10 MILLIGRAM(S): 2.5 TABLET, FILM COATED ORAL at 05:59

## 2022-03-28 RX ADMIN — HALOPERIDOL DECANOATE 5 MILLIGRAM(S): 100 INJECTION INTRAMUSCULAR at 17:40

## 2022-03-28 RX ADMIN — HALOPERIDOL DECANOATE 5 MILLIGRAM(S): 100 INJECTION INTRAMUSCULAR at 09:54

## 2022-03-28 RX ADMIN — MOMETASONE FUROATE 1 PUFF(S): 220 INHALANT RESPIRATORY (INHALATION) at 16:05

## 2022-03-28 RX ADMIN — LURASIDONE HYDROCHLORIDE 40 MILLIGRAM(S): 40 TABLET ORAL at 16:05

## 2022-03-28 RX ADMIN — Medication 2 MILLIGRAM(S): at 23:14

## 2022-03-28 RX ADMIN — CHLORHEXIDINE GLUCONATE 1 APPLICATION(S): 213 SOLUTION TOPICAL at 06:00

## 2022-03-28 RX ADMIN — Medication 75 MICROGRAM(S): at 06:00

## 2022-03-28 RX ADMIN — DEXMEDETOMIDINE HYDROCHLORIDE IN 0.9% SODIUM CHLORIDE 5.85 MICROGRAM(S)/KG/HR: 4 INJECTION INTRAVENOUS at 13:21

## 2022-03-28 RX ADMIN — MIDAZOLAM HYDROCHLORIDE 2 MILLIGRAM(S): 1 INJECTION, SOLUTION INTRAMUSCULAR; INTRAVENOUS at 20:39

## 2022-03-28 NOTE — PROGRESS NOTE ADULT - ASSESSMENT
36F with BMI 39 PMH Factor 5 Leiden Deficiency, DVT/PE on Xarelto, seizure disorder vs conversion disorder, asthma, endometriosis, extensive psychiatric history (schizoaffective disorder vs bipolar disorder vs other mood/psychotic disorders), personality disorder, intellectual disability, Cocaine use disorder with hx of aggression and psych admissions presents to ER with b/l LE edema and SOB x 2 weeks. Found to have deep venous thrombosis in the left popliteal vein. Admitted to medical service for acute DVT and likely PE. Course complicated by seizures x4 on 3/27. Was awaiting transfer to neuro unit at [facility] but no bed availability and was transferred to ICU for monitoring.  In ICU pt with agitation, combative behavior placed on precedex for sedation.     DX: recurrent seizures, L leg DVT, factor 5 Leiden Deficiency     NEURO  will place on 24 hr EEG  neuro follow up  cont with antiepileptics: vimpat, depacon  cont with abilify, latuda, seroquel for underlying psych history  on precedex for agitation, wean off as tolerates  psych eval    HEME  factor 5 leiden deficiency  hx of DVT/PE  had prior US without DVT but most recent US on admission with L knee DVT  cont with eliquis    PULM  respiratory status stable at present time    GEN  no beds available at tertiary center currently  monitor neuro status               36F with BMI 39 PMH Factor 5 Leiden Deficiency, DVT/PE on Xarelto, seizure disorder vs conversion disorder, asthma, endometriosis, extensive psychiatric history (schizoaffective disorder vs bipolar disorder vs other mood/psychotic disorders), personality disorder, intellectual disability, Cocaine use disorder with hx of aggression and psych admissions presents to ER with b/l LE edema and SOB x 2 weeks. Found to have deep venous thrombosis in the left popliteal vein. Admitted to medical service for acute DVT and likely PE. Course complicated by seizures x4 on 3/27. Was awaiting transfer to neuro unit at [facility] but no bed availability and was transferred to ICU for monitoring.  In ICU pt with agitation, combative behavior placed on precedex for sedation.     DX: recurrent seizures, L leg DVT, factor 5 Leiden Deficiency     NEURO  will place on 24 hr EEG  neuro follow up  cont with antiepileptics: vimpat, depacon  cont with abilify, latuda, seroquel for underlying psych history  on precedex for agitation, wean off as tolerates  psych eval    HEME  factor 5 leiden deficiency  hx of DVT/PE  had prior US without DVT but most recent US on admission with L knee DVT  cont with eliquis    PULM  respiratory status stable at present time    ID  asymptomatic bacteruria  no fevers  no leukocytosis  monitor off antibiotics for now     GEN  no beds available at tertiary center currently  monitor neuro status

## 2022-03-28 NOTE — CHART NOTE - NSCHARTNOTEFT_GEN_A_CORE
Carmine Sarabia called at 2300 for agitation, aggressive behavior.     At 23:00 patient assisted to commode then threw  urinal on floor and became agitated,   Patient received prn Haldol, and attempts at redirection were made ,   Patient remains agitated, attempting to hit staff,  code grey initiated   Again patient attempting to pull out IV, pulling away and attempting to hit  and kick staff/ security   Ativan and benadryl administered,   B/L wrist restraints placed on patient for her safety  Precedex ggt initiated     RN sitting at bedside ,     Vitals Signs: 122/78 P/ 72 99% on room air   -will continue close monitoring

## 2022-03-28 NOTE — PROGRESS NOTE ADULT - PROBLEM SELECTOR PLAN 1
- Events noted, patient with multiple seizures in ICU currently  - Neurology f/u  - for transfer to [facility] for multiple seizures  - patient with History of Factor V leiden Mutation as per patients history, first she stated she follows with a hematologist in [place], and has been complaint with her mediciations but admitted to not taking Xarelto with food  - patient lives in Group Home and needs to be given her medication  - Psych F/u  - Patient does not think can be compliant with coumadin and considering Psych history may not be reliable to be on coumadin and complaint with INR checks  - started on eliquis - Events noted, patient with multiple seizures in ICU currently  - Neurology f/u  - for transfer to [facility] for multiple seizures  - patient with History of Factor V leiden Mutation as per patients history, first she stated she follows with a hematologist in [place], and has been complaint with her mediciations but admitted to not taking Xarelto with food  - patient lives in Group Home and needs to be given her medication  - Psych F/u  - Patient does not think can be compliant with coumadin and considering Psych history may not be reliable to be on coumadin and complaint with INR checks  - started on eliquis  - will sign off, please re-consult with any questions
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
## 2022-03-29 ENCOUNTER — APPOINTMENT (OUTPATIENT)
Dept: NEUROLOGY | Facility: CLINIC | Age: 37
End: 2022-03-29
Payer: MEDICARE

## 2022-03-29 DIAGNOSIS — F60.3 BORDERLINE PERSONALITY DISORDER: ICD-10-CM

## 2022-03-29 DIAGNOSIS — F79 UNSPECIFIED INTELLECTUAL DISABILITIES: ICD-10-CM

## 2022-03-29 LAB
ALBUMIN SERPL ELPH-MCNC: 3.1 G/DL — LOW (ref 3.3–5)
ALP SERPL-CCNC: 49 U/L — SIGNIFICANT CHANGE UP (ref 40–120)
ALT FLD-CCNC: 49 U/L — SIGNIFICANT CHANGE UP (ref 12–78)
ANION GAP SERPL CALC-SCNC: 7 MMOL/L — SIGNIFICANT CHANGE UP (ref 5–17)
AST SERPL-CCNC: 21 U/L — SIGNIFICANT CHANGE UP (ref 15–37)
BILIRUB SERPL-MCNC: 0.2 MG/DL — SIGNIFICANT CHANGE UP (ref 0.2–1.2)
BUN SERPL-MCNC: 16 MG/DL — SIGNIFICANT CHANGE UP (ref 7–23)
CALCIUM SERPL-MCNC: 9.1 MG/DL — SIGNIFICANT CHANGE UP (ref 8.5–10.1)
CHLORIDE SERPL-SCNC: 113 MMOL/L — HIGH (ref 96–108)
CO2 SERPL-SCNC: 22 MMOL/L — SIGNIFICANT CHANGE UP (ref 22–31)
CREAT SERPL-MCNC: 0.69 MG/DL — SIGNIFICANT CHANGE UP (ref 0.5–1.3)
EGFR: 117 ML/MIN/1.73M2 — SIGNIFICANT CHANGE UP
GLUCOSE SERPL-MCNC: 132 MG/DL — HIGH (ref 70–99)
HCT VFR BLD CALC: 36.8 % — SIGNIFICANT CHANGE UP (ref 34.5–45)
HGB BLD-MCNC: 12.2 G/DL — SIGNIFICANT CHANGE UP (ref 11.5–15.5)
MAGNESIUM SERPL-MCNC: 2.4 MG/DL — SIGNIFICANT CHANGE UP (ref 1.6–2.6)
MCHC RBC-ENTMCNC: 30.3 PG — SIGNIFICANT CHANGE UP (ref 27–34)
MCHC RBC-ENTMCNC: 33.2 G/DL — SIGNIFICANT CHANGE UP (ref 32–36)
MCV RBC AUTO: 91.3 FL — SIGNIFICANT CHANGE UP (ref 80–100)
NRBC # BLD: 0 /100 WBCS — SIGNIFICANT CHANGE UP (ref 0–0)
PHOSPHATE SERPL-MCNC: 4 MG/DL — SIGNIFICANT CHANGE UP (ref 2.5–4.5)
PLATELET # BLD AUTO: 170 K/UL — SIGNIFICANT CHANGE UP (ref 150–400)
POTASSIUM SERPL-MCNC: 4.4 MMOL/L — SIGNIFICANT CHANGE UP (ref 3.5–5.3)
POTASSIUM SERPL-SCNC: 4.4 MMOL/L — SIGNIFICANT CHANGE UP (ref 3.5–5.3)
PROT SERPL-MCNC: 7 GM/DL — SIGNIFICANT CHANGE UP (ref 6–8.3)
RBC # BLD: 4.03 M/UL — SIGNIFICANT CHANGE UP (ref 3.8–5.2)
RBC # FLD: 14.3 % — SIGNIFICANT CHANGE UP (ref 10.3–14.5)
SODIUM SERPL-SCNC: 142 MMOL/L — SIGNIFICANT CHANGE UP (ref 135–145)
WBC # BLD: 5.88 K/UL — SIGNIFICANT CHANGE UP (ref 3.8–10.5)
WBC # FLD AUTO: 5.88 K/UL — SIGNIFICANT CHANGE UP (ref 3.8–10.5)

## 2022-03-29 PROCEDURE — 90792 PSYCH DIAG EVAL W/MED SRVCS: CPT

## 2022-03-29 PROCEDURE — 99291 CRITICAL CARE FIRST HOUR: CPT

## 2022-03-29 RX ORDER — LEVOTHYROXINE SODIUM 125 MCG
37.5 TABLET ORAL
Refills: 0 | Status: DISCONTINUED | OUTPATIENT
Start: 2022-03-29 | End: 2022-03-31

## 2022-03-29 RX ORDER — CHLORPROMAZINE HCL 10 MG
50 TABLET ORAL EVERY 6 HOURS
Refills: 0 | Status: DISCONTINUED | OUTPATIENT
Start: 2022-03-29 | End: 2022-03-31

## 2022-03-29 RX ORDER — SODIUM CHLORIDE 9 MG/ML
500 INJECTION, SOLUTION INTRAVENOUS ONCE
Refills: 0 | Status: COMPLETED | OUTPATIENT
Start: 2022-03-29 | End: 2022-03-29

## 2022-03-29 RX ORDER — HALOPERIDOL DECANOATE 100 MG/ML
5 INJECTION INTRAMUSCULAR ONCE
Refills: 0 | Status: COMPLETED | OUTPATIENT
Start: 2022-03-29 | End: 2022-03-29

## 2022-03-29 RX ORDER — MIDAZOLAM HYDROCHLORIDE 1 MG/ML
2 INJECTION, SOLUTION INTRAMUSCULAR; INTRAVENOUS ONCE
Refills: 0 | Status: DISCONTINUED | OUTPATIENT
Start: 2022-03-29 | End: 2022-03-29

## 2022-03-29 RX ORDER — DIPHENHYDRAMINE HCL 50 MG
25 CAPSULE ORAL ONCE
Refills: 0 | Status: COMPLETED | OUTPATIENT
Start: 2022-03-29 | End: 2022-03-29

## 2022-03-29 RX ORDER — PANTOPRAZOLE SODIUM 20 MG/1
40 TABLET, DELAYED RELEASE ORAL DAILY
Refills: 0 | Status: DISCONTINUED | OUTPATIENT
Start: 2022-03-29 | End: 2022-03-31

## 2022-03-29 RX ORDER — QUETIAPINE FUMARATE 200 MG/1
100 TABLET, FILM COATED ORAL
Refills: 0 | Status: DISCONTINUED | OUTPATIENT
Start: 2022-03-29 | End: 2022-03-31

## 2022-03-29 RX ORDER — DIPHENHYDRAMINE HCL 50 MG
50 CAPSULE ORAL ONCE
Refills: 0 | Status: COMPLETED | OUTPATIENT
Start: 2022-03-29 | End: 2022-03-29

## 2022-03-29 RX ADMIN — Medication 25 MILLIGRAM(S): at 19:36

## 2022-03-29 RX ADMIN — Medication 55 MILLIGRAM(S): at 05:52

## 2022-03-29 RX ADMIN — PANTOPRAZOLE SODIUM 40 MILLIGRAM(S): 20 TABLET, DELAYED RELEASE ORAL at 13:10

## 2022-03-29 RX ADMIN — Medication 55 MILLIGRAM(S): at 21:38

## 2022-03-29 RX ADMIN — DEXMEDETOMIDINE HYDROCHLORIDE IN 0.9% SODIUM CHLORIDE 5.85 MICROGRAM(S)/KG/HR: 4 INJECTION INTRAVENOUS at 03:50

## 2022-03-29 RX ADMIN — Medication 2 MILLIGRAM(S): at 17:38

## 2022-03-29 RX ADMIN — Medication 37.5 MICROGRAM(S): at 06:32

## 2022-03-29 RX ADMIN — Medication 55 MILLIGRAM(S): at 16:42

## 2022-03-29 RX ADMIN — DEXMEDETOMIDINE HYDROCHLORIDE IN 0.9% SODIUM CHLORIDE 5.85 MICROGRAM(S)/KG/HR: 4 INJECTION INTRAVENOUS at 05:52

## 2022-03-29 RX ADMIN — DEXMEDETOMIDINE HYDROCHLORIDE IN 0.9% SODIUM CHLORIDE 5.85 MICROGRAM(S)/KG/HR: 4 INJECTION INTRAVENOUS at 22:34

## 2022-03-29 RX ADMIN — DEXMEDETOMIDINE HYDROCHLORIDE IN 0.9% SODIUM CHLORIDE 5.85 MICROGRAM(S)/KG/HR: 4 INJECTION INTRAVENOUS at 00:41

## 2022-03-29 RX ADMIN — SODIUM CHLORIDE 100 MILLILITER(S): 9 INJECTION, SOLUTION INTRAVENOUS at 20:51

## 2022-03-29 RX ADMIN — DEXMEDETOMIDINE HYDROCHLORIDE IN 0.9% SODIUM CHLORIDE 5.85 MICROGRAM(S)/KG/HR: 4 INJECTION INTRAVENOUS at 20:19

## 2022-03-29 RX ADMIN — LACOSAMIDE 120 MILLIGRAM(S): 50 TABLET ORAL at 16:42

## 2022-03-29 RX ADMIN — CYCLOBENZAPRINE HYDROCHLORIDE 5 MILLIGRAM(S): 10 TABLET, FILM COATED ORAL at 18:13

## 2022-03-29 RX ADMIN — DEXMEDETOMIDINE HYDROCHLORIDE IN 0.9% SODIUM CHLORIDE 5.85 MICROGRAM(S)/KG/HR: 4 INJECTION INTRAVENOUS at 02:15

## 2022-03-29 RX ADMIN — ARIPIPRAZOLE 30 MILLIGRAM(S): 15 TABLET ORAL at 16:44

## 2022-03-29 RX ADMIN — DEXMEDETOMIDINE HYDROCHLORIDE IN 0.9% SODIUM CHLORIDE 5.85 MICROGRAM(S)/KG/HR: 4 INJECTION INTRAVENOUS at 07:26

## 2022-03-29 RX ADMIN — Medication 4 MILLIGRAM(S): at 23:22

## 2022-03-29 RX ADMIN — Medication 50 MILLIGRAM(S): at 18:33

## 2022-03-29 RX ADMIN — Medication 325 MILLIGRAM(S): at 16:47

## 2022-03-29 RX ADMIN — SODIUM CHLORIDE 500 MILLILITER(S): 9 INJECTION, SOLUTION INTRAVENOUS at 14:20

## 2022-03-29 RX ADMIN — Medication 2 MILLIGRAM(S): at 12:19

## 2022-03-29 RX ADMIN — CHLORHEXIDINE GLUCONATE 1 APPLICATION(S): 213 SOLUTION TOPICAL at 05:53

## 2022-03-29 RX ADMIN — APIXABAN 10 MILLIGRAM(S): 2.5 TABLET, FILM COATED ORAL at 18:05

## 2022-03-29 RX ADMIN — SODIUM CHLORIDE 100 MILLILITER(S): 9 INJECTION, SOLUTION INTRAVENOUS at 02:15

## 2022-03-29 RX ADMIN — HALOPERIDOL DECANOATE 5 MILLIGRAM(S): 100 INJECTION INTRAMUSCULAR at 11:51

## 2022-03-29 RX ADMIN — QUETIAPINE FUMARATE 100 MILLIGRAM(S): 200 TABLET, FILM COATED ORAL at 18:04

## 2022-03-29 RX ADMIN — Medication 50 MILLIGRAM(S): at 23:20

## 2022-03-29 RX ADMIN — Medication 2 MILLIGRAM(S): at 20:08

## 2022-03-29 RX ADMIN — LACOSAMIDE 120 MILLIGRAM(S): 50 TABLET ORAL at 23:33

## 2022-03-29 RX ADMIN — DEXMEDETOMIDINE HYDROCHLORIDE IN 0.9% SODIUM CHLORIDE 5.85 MICROGRAM(S)/KG/HR: 4 INJECTION INTRAVENOUS at 12:49

## 2022-03-29 RX ADMIN — MIDAZOLAM HYDROCHLORIDE 2 MILLIGRAM(S): 1 INJECTION, SOLUTION INTRAMUSCULAR; INTRAVENOUS at 12:07

## 2022-03-29 RX ADMIN — Medication 50 MILLIGRAM(S): at 23:23

## 2022-03-29 RX ADMIN — DEXMEDETOMIDINE HYDROCHLORIDE IN 0.9% SODIUM CHLORIDE 5.85 MICROGRAM(S)/KG/HR: 4 INJECTION INTRAVENOUS at 18:14

## 2022-03-29 NOTE — BH CONSULTATION LIAISON ASSESSMENT NOTE - DETAILS
Reports 2 past SAs, last in 2017 stating "I took a lot of pills to try to harm myself" affirming intent to die. as per HPI; otherwise Unable to assess

## 2022-03-29 NOTE — CHART NOTE - NSCHARTNOTEFT_GEN_A_CORE
pt continues to bang on bed rails and started to kick foot of bed. Asked pt to stop with concerns she will hurt her own extremities. Pt responded "I don't care if I get hurt".    Pt then swung and kicked her legs off the bed in attempt to kick nurse at bedside. Pt continued to attempt kicking staff. Carmine pierre called.     Asked pt to place her own legs back into the bed. Pt replied "no, I don't care if I fall". Pt continued to yell profanities and states she is an "independent person" and started calling bedside nurse the "N " word and states "I trust white people more than that black "n", I've got white blood in me".     Attempted to reorient patient and asked her to stop the name calling. Pt states she is "going to court on the 30th and my  will take care of you all"    Pt asked that she be unrestrained. Explained to pt if she attempts to injure herself or others the restraints cannot come off. Unrestrained one arm of pt and pt immediate took a swing at nurse. Pt pulled out all IV access and continues to scream and bang her arms and legs against bed.     Haldol 5mg IM x1 given.

## 2022-03-29 NOTE — BH CONSULTATION LIAISON ASSESSMENT NOTE - SUMMARY
Noted interval events since last seen involving aggressive, intentional behavior which is Pt's well known baseline and is NOT secondary to a primary psyhcotic or mood process but a manifestation of Patient's Axis II pathologies. No subsequent seizures;
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
## 2022-03-29 NOTE — BH CONSULTATION LIAISON ASSESSMENT NOTE - NSBHCHARTREVIEWLAB_PSY_A_CORE FT
03-29    142  |  113<H>  |  16  ----------------------------<  132<H>  4.4   |  22  |  0.69    Ca    9.1      29 Mar 2022 03:14  Phos  4.0     03-29  Mg     2.4     03-29    TPro  7.0  /  Alb  3.1<L>  /  TBili  0.2  /  DBili  x   /  AST  21  /  ALT  49  /  AlkPhos  49  03-29

## 2022-03-29 NOTE — BH CONSULTATION LIAISON ASSESSMENT NOTE - NS ED BHA HOMICIDALITY PRESENT CURRENT PLAN
МАРИЯ Santos reviewed discharge instructions with the patient. The patient verbalized understanding. Patient armband removed and given to patient to take home.   Patient was informed of the privacy risks if armband lost or stolen Yes

## 2022-03-29 NOTE — BH CONSULTATION LIAISON ASSESSMENT NOTE - CURRENT MEDICATION
MEDICATIONS  (STANDING):  apixaban 10 milliGRAM(s) Oral every 12 hours  ARIPiprazole 30 milliGRAM(s) Oral daily  chlorhexidine 2% Cloths 1 Application(s) Topical <User Schedule>  dexMEDEtomidine Infusion 0.2 MICROgram(s)/kG/Hr (5.85 mL/Hr) IV Continuous <Continuous>  ferrous    sulfate 325 milliGRAM(s) Oral daily  influenza   Vaccine 0.5 milliLiter(s) IntraMuscular once  lacosamide IVPB 100 milliGRAM(s) IV Intermittent <User Schedule>  lactated ringers. 1000 milliLiter(s) (100 mL/Hr) IV Continuous <Continuous>  levothyroxine Injectable 37.5 MICROGram(s) IV Push <User Schedule>  lurasidone 40 milliGRAM(s) Oral daily  mometasone 220 MICROgram(s) Inhaler 1 Puff(s) Inhalation daily  pantoprazole  Injectable 40 milliGRAM(s) IV Push daily  QUEtiapine 50 milliGRAM(s) Oral two times a day  topiramate 25 milliGRAM(s) Oral daily  valproate sodium IVPB 500 milliGRAM(s) IV Intermittent three times a day    MEDICATIONS  (PRN):  acetaminophen     Tablet .. 650 milliGRAM(s) Oral every 6 hours PRN Temp greater or equal to 38C (100.4F), Mild Pain (1 - 3)  ALBUTerol    90 MICROgram(s) HFA Inhaler 2 Puff(s) Inhalation every 6 hours PRN Shortness of Breath and/or Wheezing  cyclobenzaprine 5 milliGRAM(s) Oral three times a day PRN Muscle Spasm  diphenhydrAMINE 25 milliGRAM(s) Oral every 6 hours PRN Rash and/or Itching  haloperidol    Injectable 5 milliGRAM(s) IV Push every 6 hours PRN agitation  hydrocortisone 2.5% Cream 1 Application(s) Topical every 6 hours PRN Rash and/or Itching

## 2022-03-29 NOTE — BH CONSULTATION LIAISON ASSESSMENT NOTE - RISK ASSESSMENT
Patient is at chronic elevated risk for self-injury and aggression towards others / property given institutionalization & intellectual / cognitive limitations which lay the foundation of lifelong dysfunction across the areas of mood, behaviors and adaptive behaviors; hx of suicidal gestures; hx of aggressiom, hx of substance use. Protective factors include young age, female gender, lives in supervised residence where she has no access to substances or guns; access to care, stable domicile, no recent suicidality.

## 2022-03-29 NOTE — DIETITIAN INITIAL EVALUATION ADULT. - OTHER INFO
Pt from group home; independent for ADLs PTA; receives social support from group home staff & aunt.  Pt admitted for acute DVT and likely PE; transferred to ICU for monitoring recurrent seizures. PMH Factor 5 Leiden Deficiency, DVT/PE, seizure disorder vs conversion disorder, asthma, endometriosis, extensive psychiatric hx (schizoaffective disorder vs bipolar disorder vs other mood/psychotic disorders), personality disorder, intellectual disability, cocaine use disorder with h/o aggression & psych admissions. Pt has been aggressive, combative & agitated during hospitalization (placed on Precedex in ICU). Pt was supposed to be transferred to neuro unit at [facility] yesterday 3/28, however pt refused transfer.  Pt currently sleeping. Per nursing staff, pt with variable po intake during hospitalization, %; no chew/swallowing difficulty.

## 2022-03-29 NOTE — BH CONSULTATION LIAISON ASSESSMENT NOTE - NSBHCONSULTRECOMMENDOTHER_PSY_A_CORE FT
patient is on an ODD medication regimen with 3 sub-optimal doses of antipsychotics: Seroquel 50mg po bid, haldol 5mg po bid, depakote 500mg po bid, Latuda , Topamax 25mg po qd, Latuda 40mg po qd, Abilify 30mg po qd, no longer on trazodone so it was discontinued.   RECOMMENDING: currently on Depacon 500mg IVP TID which can be converted to depakote 500mg PO in am & 1000mg PO qhs, continue Latuda 40mg, Abilify 30mg, discontinue haldol 5mg PO bid ; increase Seroquel to 100mg Po bid; Topamax at 25mg PO qd is a minimal dose and of little clinical utility in this case (would discontinue and simplify Pt's regimen)  - thorazine 50mg IM q6hrs PRN for agitation instead of haldol  patient is on an ODD medication regimen with 3 sub-optimal doses of antipsychotics: Seroquel 50mg po bid, haldol 5mg po bid, depakote 500mg po bid, Latuda , Topamax 25mg po qd, Latuda 40mg po qd, Abilify 30mg po qd, no longer on trazodone so it was discontinued.   RECOMMENDING: currently on Depacon 500mg IVP TID which can be converted to depakote 500mg PO in am & 1000mg PO qhs, continue Latuda 40mg, Abilify 30mg, discontinue haldol 5mg PO bid ; increase Seroquel to 100mg Po bid; Topamax at 25mg PO qd is a minimal dose and of little clinical utility in this case (would discontinue and simplify Pt's regimen)  - thorazine 50mg IM q6hrs PRN for agitation instead of haldol with Ativan 2mg IM q6hrs PRN

## 2022-03-29 NOTE — BH CONSULTATION LIAISON ASSESSMENT NOTE - NSBHCHARTREVIEWVS_PSY_A_CORE FT
Vital Signs Last 24 Hrs  T(C): 34.3 (29 Mar 2022 08:00), Max: 34.8 (29 Mar 2022 05:00)  T(F): 93.7 (29 Mar 2022 08:00), Max: 94.7 (29 Mar 2022 05:00)  HR: 92 (29 Mar 2022 12:00) (41 - 92)  BP: 99/70 (29 Mar 2022 12:00) (78/49 - 129/90)  BP(mean): 78 (29 Mar 2022 12:00) (56 - 98)  RR: 22 (29 Mar 2022 12:00) (13 - 23)  SpO2: 97% (29 Mar 2022 12:00) (91% - 100%)

## 2022-03-29 NOTE — DIETITIAN INITIAL EVALUATION ADULT. - PERTINENT LABORATORY DATA
03-29 Na142 mmol/L Glu 132 mg/dL<H> K+ 4.4 mmol/L Cr  0.69 mg/dL BUN 16 mg/dL WBC 5.88 K/uL 03-29 Phos 4.0 mg/dL 03-29 Alb 3.1 g/dL<L>

## 2022-03-29 NOTE — DIETITIAN INITIAL EVALUATION ADULT. - BODY MASS INDEX
39.2
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
## 2022-03-29 NOTE — BH CONSULTATION LIAISON ASSESSMENT NOTE - NSBHCONSULTFOLLOWAFTERCARE_PSY_A_CORE FT
group home staff should administer Patient her medications; do a mouth check as well since seizures are due to her not taking her anti-convulsants. Given Patient's functioning level, she should not be managing her own medications. Has outpatient services set up already and does not need any referrals.

## 2022-03-30 ENCOUNTER — TRANSCRIPTION ENCOUNTER (OUTPATIENT)
Age: 37
End: 2022-03-30

## 2022-03-30 LAB
CULTURE RESULTS: SIGNIFICANT CHANGE UP
CULTURE RESULTS: SIGNIFICANT CHANGE UP
FLUAV AG NPH QL: SIGNIFICANT CHANGE UP
FLUBV AG NPH QL: SIGNIFICANT CHANGE UP
HCT VFR BLD CALC: 38.9 % — SIGNIFICANT CHANGE UP (ref 34.5–45)
HGB BLD-MCNC: 12.6 G/DL — SIGNIFICANT CHANGE UP (ref 11.5–15.5)
MCHC RBC-ENTMCNC: 29.6 PG — SIGNIFICANT CHANGE UP (ref 27–34)
MCHC RBC-ENTMCNC: 32.4 G/DL — SIGNIFICANT CHANGE UP (ref 32–36)
MCV RBC AUTO: 91.5 FL — SIGNIFICANT CHANGE UP (ref 80–100)
NRBC # BLD: 0 /100 WBCS — SIGNIFICANT CHANGE UP (ref 0–0)
PLATELET # BLD AUTO: 179 K/UL — SIGNIFICANT CHANGE UP (ref 150–400)
RBC # BLD: 4.25 M/UL — SIGNIFICANT CHANGE UP (ref 3.8–5.2)
RBC # FLD: 14.3 % — SIGNIFICANT CHANGE UP (ref 10.3–14.5)
SARS-COV-2 RNA SPEC QL NAA+PROBE: SIGNIFICANT CHANGE UP
SPECIMEN SOURCE: SIGNIFICANT CHANGE UP
SPECIMEN SOURCE: SIGNIFICANT CHANGE UP
WBC # BLD: 6.47 K/UL — SIGNIFICANT CHANGE UP (ref 3.8–10.5)
WBC # FLD AUTO: 6.47 K/UL — SIGNIFICANT CHANGE UP (ref 3.8–10.5)

## 2022-03-30 PROCEDURE — 99232 SBSQ HOSP IP/OBS MODERATE 35: CPT

## 2022-03-30 PROCEDURE — 99291 CRITICAL CARE FIRST HOUR: CPT

## 2022-03-30 PROCEDURE — 99231 SBSQ HOSP IP/OBS SF/LOW 25: CPT

## 2022-03-30 RX ORDER — MIDAZOLAM HYDROCHLORIDE 1 MG/ML
4 INJECTION, SOLUTION INTRAMUSCULAR; INTRAVENOUS ONCE
Refills: 0 | Status: DISCONTINUED | OUTPATIENT
Start: 2022-03-30 | End: 2022-03-30

## 2022-03-30 RX ORDER — QUETIAPINE FUMARATE 200 MG/1
100 TABLET, FILM COATED ORAL ONCE
Refills: 0 | Status: COMPLETED | OUTPATIENT
Start: 2022-03-30 | End: 2022-03-30

## 2022-03-30 RX ORDER — DIPHENHYDRAMINE HCL 50 MG
50 CAPSULE ORAL ONCE
Refills: 0 | Status: COMPLETED | OUTPATIENT
Start: 2022-03-30 | End: 2022-03-30

## 2022-03-30 RX ORDER — CYCLOBENZAPRINE HYDROCHLORIDE 10 MG/1
10 TABLET, FILM COATED ORAL ONCE
Refills: 0 | Status: COMPLETED | OUTPATIENT
Start: 2022-03-30 | End: 2022-03-30

## 2022-03-30 RX ORDER — CHLORPROMAZINE HCL 10 MG
50 TABLET ORAL ONCE
Refills: 0 | Status: COMPLETED | OUTPATIENT
Start: 2022-03-30 | End: 2022-03-30

## 2022-03-30 RX ORDER — DIVALPROEX SODIUM 500 MG/1
500 TABLET, DELAYED RELEASE ORAL DAILY
Refills: 0 | Status: DISCONTINUED | OUTPATIENT
Start: 2022-03-30 | End: 2022-03-30

## 2022-03-30 RX ORDER — DIVALPROEX SODIUM 500 MG/1
2 TABLET, DELAYED RELEASE ORAL
Qty: 60 | Refills: 0
Start: 2022-03-30 | End: 2022-04-28

## 2022-03-30 RX ORDER — MIDAZOLAM HYDROCHLORIDE 1 MG/ML
2 INJECTION, SOLUTION INTRAMUSCULAR; INTRAVENOUS ONCE
Refills: 0 | Status: DISCONTINUED | OUTPATIENT
Start: 2022-03-30 | End: 2022-03-30

## 2022-03-30 RX ORDER — MIDAZOLAM HYDROCHLORIDE 1 MG/ML
3 INJECTION, SOLUTION INTRAMUSCULAR; INTRAVENOUS ONCE
Refills: 0 | Status: DISCONTINUED | OUTPATIENT
Start: 2022-03-30 | End: 2022-03-30

## 2022-03-30 RX ORDER — VALPROIC ACID (AS SODIUM SALT) 250 MG/5ML
750 SOLUTION, ORAL ORAL ONCE
Refills: 0 | Status: COMPLETED | OUTPATIENT
Start: 2022-03-30 | End: 2022-03-30

## 2022-03-30 RX ORDER — DIVALPROEX SODIUM 500 MG/1
500 TABLET, DELAYED RELEASE ORAL
Refills: 0 | Status: DISCONTINUED | OUTPATIENT
Start: 2022-03-30 | End: 2022-03-31

## 2022-03-30 RX ORDER — APIXABAN 2.5 MG/1
2 TABLET, FILM COATED ORAL
Qty: 16 | Refills: 0
Start: 2022-03-30 | End: 2022-04-02

## 2022-03-30 RX ORDER — DIVALPROEX SODIUM 500 MG/1
3 TABLET, DELAYED RELEASE ORAL
Qty: 180 | Refills: 0
Start: 2022-03-30 | End: 2022-05-28

## 2022-03-30 RX ORDER — DIPHENHYDRAMINE HCL 50 MG
25 CAPSULE ORAL ONCE
Refills: 0 | Status: COMPLETED | OUTPATIENT
Start: 2022-03-30 | End: 2022-03-30

## 2022-03-30 RX ORDER — FERROUS SULFATE 325(65) MG
1 TABLET ORAL
Qty: 30 | Refills: 0
Start: 2022-03-30

## 2022-03-30 RX ORDER — DIVALPROEX SODIUM 500 MG/1
1 TABLET, DELAYED RELEASE ORAL
Qty: 30 | Refills: 0
Start: 2022-03-30 | End: 2022-04-28

## 2022-03-30 RX ORDER — DIVALPROEX SODIUM 500 MG/1
1000 TABLET, DELAYED RELEASE ORAL AT BEDTIME
Refills: 0 | Status: DISCONTINUED | OUTPATIENT
Start: 2022-03-30 | End: 2022-03-31

## 2022-03-30 RX ORDER — DIVALPROEX SODIUM 500 MG/1
1 TABLET, DELAYED RELEASE ORAL
Qty: 0 | Refills: 0 | DISCHARGE
Start: 2022-03-30

## 2022-03-30 RX ORDER — DIPHENHYDRAMINE HCL 50 MG
50 CAPSULE ORAL ONCE
Refills: 0 | Status: DISCONTINUED | OUTPATIENT
Start: 2022-03-30 | End: 2022-03-30

## 2022-03-30 RX ORDER — HALOPERIDOL DECANOATE 100 MG/ML
3 INJECTION INTRAMUSCULAR ONCE
Refills: 0 | Status: COMPLETED | OUTPATIENT
Start: 2022-03-30 | End: 2022-03-30

## 2022-03-30 RX ORDER — DIVALPROEX SODIUM 500 MG/1
2 TABLET, DELAYED RELEASE ORAL
Qty: 0 | Refills: 0 | DISCHARGE
Start: 2022-03-30

## 2022-03-30 RX ADMIN — MIDAZOLAM HYDROCHLORIDE 4 MILLIGRAM(S): 1 INJECTION, SOLUTION INTRAMUSCULAR; INTRAVENOUS at 21:52

## 2022-03-30 RX ADMIN — Medication 57.5 MILLIGRAM(S): at 14:24

## 2022-03-30 RX ADMIN — Medication 25 MILLIGRAM(S): at 04:05

## 2022-03-30 RX ADMIN — Medication 2 MILLIGRAM(S): at 12:59

## 2022-03-30 RX ADMIN — Medication 50 MILLIGRAM(S): at 22:30

## 2022-03-30 RX ADMIN — Medication 25 MILLIGRAM(S): at 19:39

## 2022-03-30 RX ADMIN — MIDAZOLAM HYDROCHLORIDE 3 MILLIGRAM(S): 1 INJECTION, SOLUTION INTRAMUSCULAR; INTRAVENOUS at 20:00

## 2022-03-30 RX ADMIN — MIDAZOLAM HYDROCHLORIDE 4 MILLIGRAM(S): 1 INJECTION, SOLUTION INTRAMUSCULAR; INTRAVENOUS at 16:40

## 2022-03-30 RX ADMIN — CYCLOBENZAPRINE HYDROCHLORIDE 10 MILLIGRAM(S): 10 TABLET, FILM COATED ORAL at 21:52

## 2022-03-30 RX ADMIN — Medication 2 MILLIGRAM(S): at 09:17

## 2022-03-30 RX ADMIN — DIVALPROEX SODIUM 500 MILLIGRAM(S): 500 TABLET, DELAYED RELEASE ORAL at 22:30

## 2022-03-30 RX ADMIN — Medication 50 MILLIGRAM(S): at 06:43

## 2022-03-30 RX ADMIN — ARIPIPRAZOLE 20 MILLIGRAM(S): 15 TABLET ORAL at 12:08

## 2022-03-30 RX ADMIN — Medication 325 MILLIGRAM(S): at 12:02

## 2022-03-30 RX ADMIN — PANTOPRAZOLE SODIUM 40 MILLIGRAM(S): 20 TABLET, DELAYED RELEASE ORAL at 11:41

## 2022-03-30 RX ADMIN — Medication 2 MILLIGRAM(S): at 06:55

## 2022-03-30 RX ADMIN — MIDAZOLAM HYDROCHLORIDE 2 MILLIGRAM(S): 1 INJECTION, SOLUTION INTRAMUSCULAR; INTRAVENOUS at 18:59

## 2022-03-30 RX ADMIN — LACOSAMIDE 120 MILLIGRAM(S): 50 TABLET ORAL at 13:40

## 2022-03-30 RX ADMIN — Medication 2 MILLIGRAM(S): at 04:02

## 2022-03-30 RX ADMIN — CYCLOBENZAPRINE HYDROCHLORIDE 5 MILLIGRAM(S): 10 TABLET, FILM COATED ORAL at 18:21

## 2022-03-30 RX ADMIN — Medication 2 MILLIGRAM(S): at 18:20

## 2022-03-30 RX ADMIN — Medication 50 MILLIGRAM(S): at 12:52

## 2022-03-30 RX ADMIN — QUETIAPINE FUMARATE 100 MILLIGRAM(S): 200 TABLET, FILM COATED ORAL at 05:05

## 2022-03-30 RX ADMIN — Medication 55 MILLIGRAM(S): at 05:06

## 2022-03-30 RX ADMIN — HALOPERIDOL DECANOATE 3 MILLIGRAM(S): 100 INJECTION INTRAMUSCULAR at 09:18

## 2022-03-30 RX ADMIN — CHLORHEXIDINE GLUCONATE 1 APPLICATION(S): 213 SOLUTION TOPICAL at 05:06

## 2022-03-30 RX ADMIN — LACOSAMIDE 120 MILLIGRAM(S): 50 TABLET ORAL at 23:32

## 2022-03-30 RX ADMIN — Medication 25 MILLIGRAM(S): at 11:39

## 2022-03-30 RX ADMIN — MIDAZOLAM HYDROCHLORIDE 4 MILLIGRAM(S): 1 INJECTION, SOLUTION INTRAMUSCULAR; INTRAVENOUS at 13:13

## 2022-03-30 RX ADMIN — Medication 2 MILLIGRAM(S): at 10:13

## 2022-03-30 RX ADMIN — Medication 50 MILLIGRAM(S): at 18:21

## 2022-03-30 RX ADMIN — Medication 37.5 MICROGRAM(S): at 05:06

## 2022-03-30 RX ADMIN — Medication 50 MILLIGRAM(S): at 17:00

## 2022-03-30 RX ADMIN — Medication 50 MILLIGRAM(S): at 19:01

## 2022-03-30 RX ADMIN — APIXABAN 10 MILLIGRAM(S): 2.5 TABLET, FILM COATED ORAL at 05:05

## 2022-03-30 RX ADMIN — LURASIDONE HYDROCHLORIDE 40 MILLIGRAM(S): 40 TABLET ORAL at 12:02

## 2022-03-30 RX ADMIN — Medication 50 MILLIGRAM(S): at 21:51

## 2022-03-30 RX ADMIN — SODIUM CHLORIDE 100 MILLILITER(S): 9 INJECTION, SOLUTION INTRAVENOUS at 06:44

## 2022-03-30 RX ADMIN — Medication 50 MILLIGRAM(S): at 13:25

## 2022-03-30 RX ADMIN — QUETIAPINE FUMARATE 100 MILLIGRAM(S): 200 TABLET, FILM COATED ORAL at 21:52

## 2022-03-30 RX ADMIN — Medication 50 MILLIGRAM(S): at 09:17

## 2022-03-30 RX ADMIN — APIXABAN 10 MILLIGRAM(S): 2.5 TABLET, FILM COATED ORAL at 17:51

## 2022-03-30 RX ADMIN — Medication 25 MILLIGRAM(S): at 11:18

## 2022-03-30 RX ADMIN — QUETIAPINE FUMARATE 100 MILLIGRAM(S): 200 TABLET, FILM COATED ORAL at 17:46

## 2022-03-30 NOTE — PROCEDURE NOTE - NSPROCDETAILS_GEN_ALL_CORE
under ultrasound guidance/location identified, draped/prepped, sterile technique used/blood seen on insertion/dressing applied/flushes easily/secured in place/sterile technique, catheter placed
under ultrasound guidance/location identified, draped/prepped, sterile technique used/blood seen on insertion/dressing applied/flushes easily/secured in place/sterile technique, catheter placed
location identified, draped/prepped, sterile technique used/sterile dressing applied/sterile technique, catheter placed/supine position/ultrasound guidance

## 2022-03-30 NOTE — DISCHARGE NOTE NURSING/CASE MANAGEMENT/SOCIAL WORK - PATIENT PORTAL LINK FT
You can access the FollowMyHealth Patient Portal offered by [facility] by registering at the following website: [email or web address]. By joining [employer]’s FollowMyHealth portal, you will also be able to view your health information using other applications (apps) compatible with our system.
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
## 2022-03-30 NOTE — CHART NOTE - NSCHARTNOTEFT_GEN_A_CORE
ICU Transfer Note    Transfer from:     Transfer to: (  ) Medicine     (X) Telemetry    (  ) RCU                               (  ) Palliative    (  ) Stroke Unit    (  ) MICU    (  ) CCU    Signout given to:     ----------------------------------------------------------------------------------------------------------  HPI / ICU COURSE: 37 y/o obese F w/Factor V Leiden deficiency, seizure disorder, and extensive psychiatric history admitted for seizures in setting of medication noncompliance w/hospital course c/b severe agitation and violence with patient being a danger to self and staff. Patient was transferred to the ICU for seizures on the floors and agitation. ICU course has been uncomplicated. Patient briefly on precedex gtt for agitation which has been weaned off, with patient now receiving PRN psych medications per Psychiatry Attending. No seizure activity noted during ICU admission and patient on AEDs per Neuro. Midline placed 3/30/22 for venous access. Patient seen and examined by ICU attending who agrees patient is stable for downgrade to Telemetry at this time for continued management.     --------------------------------------------------------------------------------------------------------  PMH/PSH:  PAST MEDICAL & SURGICAL HISTORY:  Asthma    Seizure    Factor V Leiden    Bipolar disorder    Endometriosis    History of DVT in adulthood    No significant past surgical history        -------------------------------------------------------------------------------------------------------  PHYSICAL EXAM:  General: NAD  HEENT: Atraumatic  Respiratory: CTAB  Cardiac: RRR  Abdomen: soft, non-tender, non-distended  Extremities: warm and well-perfused  Neuro: A+Ox4. No FND    Vital Signs Last 24 Hrs  T(C): 36.4 (30 Mar 2022 03:30), Max: 36.6 (29 Mar 2022 23:00)  T(F): 97.6 (30 Mar 2022 03:30), Max: 97.8 (29 Mar 2022 23:00)  HR: 123 (30 Mar 2022 10:00) (55 - 144)  BP: 101/67 (30 Mar 2022 10:00) (65/51 - 120/73)  BP(mean): 73 (30 Mar 2022 10:00) (49 - 84)  RR: 26 (30 Mar 2022 10:00) (14 - 26)  SpO2: 100% (30 Mar 2022 10:00) (80% - 100%)    I&O's Summary    29 Mar 2022 07:01  -  30 Mar 2022 07:00  --------------------------------------------------------  IN: 2784.6 mL / OUT: 1600 mL / NET: 1184.6 mL        --------------------------------------------------------------------------------------------------------  LABS:                               12.6   6.47  )-----------( 179      ( 30 Mar 2022 03:11 )             38.9       03-29    142  |  113<H>  |  16  ----------------------------<  132<H>  4.4   |  22  |  0.69    Ca    9.1      29 Mar 2022 03:14  Phos  4.0     03-29  Mg     2.4     03-29    TPro  7.0  /  Alb  3.1<L>  /  TBili  0.2  /  DBili  x   /  AST  21  /  ALT  49  /  AlkPhos  49  03-29              CULTURE RESULTS:                03-25-22 @ 11:18  Specimen Source: --  Method Type: --  Gram Stain - RRL: --  Gram Stain - Wound: --  Bacteria: --  Culture Results:   No growth to date.      Specimen Source:   Method Type: Method Type: PETE (03-25-22 @ 10:22)    Gram Stain:   Culture Results: Culture Results:   No growth to date. (03-25-22 @ 11:18)  Culture Results:   No growth to date. (03-25-22 @ 11:18)  Culture Results:   50,000 - 99,000 CFU/mL Enterococcus faecium (03-25-22 @ 10:22)    Bacteria: Bacteria: Moderate (03-25-22 @ 06:57)        -------------------------------------------------------------------------------------------------  RADIOLOGY:      ---------------------------------------------------------------------------------------------------  ASSESSMENT & PLAN: 37 y/o obese F w/Factor V Leiden deficiency, seizure disorder, and extensive psychiatric history admitted for seizures in setting of medication noncompliance w/hospital course c/b severe agitation and violence with patient being a danger to self and staff. Patient was transferred to the ICU for seizures on the floors and agitation. ICU course has been uncomplicated. Patient briefly on precedex gtt for agitation which has been weaned off, with patient now receiving PRN psych medications per Psychiatry Attending. No seizure activity noted during ICU admission and patient on AEDs per Neuro. Midline placed 3/30/22 for venous access. Patient seen and examined by ICU attending who agrees patient is stable for downgrade to Telemetry at this time for continued management.     FOR FOLLOW UP:  [ ] Continue with management as per Primary Team and Psych  [ ] Continue with AEDs, Neuro follow up. Monitor for signs of seizures  [ ] Social Work & Case Management follow up ICU Transfer Note    Transfer from: ICU    Transfer to: (  ) Medicine     (X) Telemetry    (  ) RCU                               (  ) Palliative    (  ) Stroke Unit    (  ) MICU    (  ) CCU    Signout given to: Dr. Welch    ----------------------------------------------------------------------------------------------------------  HPI / ICU COURSE: 37 y/o obese F w/Factor V Leiden deficiency, seizure disorder, and extensive psychiatric history admitted for seizures in setting of medication noncompliance w/hospital course c/b severe agitation and violence with patient being a danger to self and staff. Patient was transferred to the ICU for seizures on the floors and agitation. ICU course has been uncomplicated. Patient briefly on precedex gtt for agitation which has been weaned off, with patient now receiving PRN psych medications per Psychiatry Attending. No seizure activity noted during ICU admission and patient on AEDs per Neuro. Midline placed 3/30/22 for venous access. Patient seen and examined by ICU attending who agrees patient is stable for downgrade to Telemetry at this time for continued management.     --------------------------------------------------------------------------------------------------------  PMH/PSH:  PAST MEDICAL & SURGICAL HISTORY:  Asthma    Seizure    Factor V Leiden    Bipolar disorder    Endometriosis    History of DVT in adulthood    No significant past surgical history        -------------------------------------------------------------------------------------------------------  PHYSICAL EXAM:  General: NAD  HEENT: Atraumatic  Respiratory: CTAB  Cardiac: RRR  Abdomen: soft, non-tender, non-distended  Extremities: warm and well-perfused  Neuro: A+Ox4. No FND    Vital Signs Last 24 Hrs  T(C): 36.4 (30 Mar 2022 03:30), Max: 36.6 (29 Mar 2022 23:00)  T(F): 97.6 (30 Mar 2022 03:30), Max: 97.8 (29 Mar 2022 23:00)  HR: 123 (30 Mar 2022 10:00) (55 - 144)  BP: 101/67 (30 Mar 2022 10:00) (65/51 - 120/73)  BP(mean): 73 (30 Mar 2022 10:00) (49 - 84)  RR: 26 (30 Mar 2022 10:00) (14 - 26)  SpO2: 100% (30 Mar 2022 10:00) (80% - 100%)    I&O's Summary    29 Mar 2022 07:01  -  30 Mar 2022 07:00  --------------------------------------------------------  IN: 2784.6 mL / OUT: 1600 mL / NET: 1184.6 mL        --------------------------------------------------------------------------------------------------------  LABS:                               12.6   6.47  )-----------( 179      ( 30 Mar 2022 03:11 )             38.9       03-29    142  |  113<H>  |  16  ----------------------------<  132<H>  4.4   |  22  |  0.69    Ca    9.1      29 Mar 2022 03:14  Phos  4.0     03-29  Mg     2.4     03-29    TPro  7.0  /  Alb  3.1<L>  /  TBili  0.2  /  DBili  x   /  AST  21  /  ALT  49  /  AlkPhos  49  03-29              CULTURE RESULTS:                03-25-22 @ 11:18  Specimen Source: --  Method Type: --  Gram Stain - RRL: --  Gram Stain - Wound: --  Bacteria: --  Culture Results:   No growth to date.      Specimen Source:   Method Type: Method Type: PETE (03-25-22 @ 10:22)    Gram Stain:   Culture Results: Culture Results:   No growth to date. (03-25-22 @ 11:18)  Culture Results:   No growth to date. (03-25-22 @ 11:18)  Culture Results:   50,000 - 99,000 CFU/mL Enterococcus faecium (03-25-22 @ 10:22)    Bacteria: Bacteria: Moderate (03-25-22 @ 06:57)        -------------------------------------------------------------------------------------------------  RADIOLOGY:      ---------------------------------------------------------------------------------------------------  ASSESSMENT & PLAN: 37 y/o obese F w/Factor V Leiden deficiency, seizure disorder, and extensive psychiatric history admitted for seizures in setting of medication noncompliance w/hospital course c/b severe agitation and violence with patient being a danger to self and staff. Patient was transferred to the ICU for seizures on the floors and agitation. ICU course has been uncomplicated. Patient briefly on precedex gtt for agitation which has been weaned off, with patient now receiving PRN psych medications per Psychiatry Attending. No seizure activity noted during ICU admission and patient on AEDs per Neuro. Midline placed 3/30/22 for venous access. Patient seen and examined by ICU attending who agrees patient is stable for downgrade to Telemetry at this time for continued management.     FOR FOLLOW UP:  [ ] Continue with management as per Primary Team and Psych  [ ] Continue with AEDs, Neuro follow up. Monitor for signs of seizures  [ ] Social Work & Case Management follow up

## 2022-03-30 NOTE — PROGRESS NOTE ADULT - ASSESSMENT
35 y/o obese F w/Factor V Leiden deficiency, seizure disorder, and extensive psychiatric history admitted for seizures in setting of medication noncompliance w/hospital course c/b severe agitation and violence with patient being a danger to self and staff.    - No further precedex, behavior is no different on/off precedex  - Appreciate psych assistance, continue to adjust psych meds as needed  - Continue AEDs  - Continue therapeutic AC for LLE DVT  - Downgrade to medicine

## 2022-03-30 NOTE — DISCHARGE NOTE NURSING/CASE MANAGEMENT/SOCIAL WORK - NSDCPEFALRISK_GEN_ALL_CORE
For information on Fall & Injury Prevention, visit: [email or web address] OR  [email or web address] OR  https://www.cdc.gov/steadi/patient.html

## 2022-03-31 VITALS
TEMPERATURE: 98 F | RESPIRATION RATE: 21 BRPM | HEART RATE: 118 BPM | SYSTOLIC BLOOD PRESSURE: 112 MMHG | OXYGEN SATURATION: 98 % | DIASTOLIC BLOOD PRESSURE: 71 MMHG

## 2022-03-31 PROCEDURE — 99231 SBSQ HOSP IP/OBS SF/LOW 25: CPT

## 2022-03-31 PROCEDURE — 99233 SBSQ HOSP IP/OBS HIGH 50: CPT

## 2022-03-31 RX ORDER — PANTOPRAZOLE SODIUM 20 MG/1
40 TABLET, DELAYED RELEASE ORAL
Qty: 0 | Refills: 0 | DISCHARGE
Start: 2022-03-31

## 2022-03-31 RX ORDER — APIXABAN 2.5 MG/1
2 TABLET, FILM COATED ORAL
Qty: 0 | Refills: 0 | DISCHARGE
Start: 2022-03-31

## 2022-03-31 RX ORDER — DIPHENHYDRAMINE HCL 50 MG
25 CAPSULE ORAL ONCE
Refills: 0 | Status: COMPLETED | OUTPATIENT
Start: 2022-03-31 | End: 2022-03-31

## 2022-03-31 RX ORDER — FERROUS SULFATE 325(65) MG
1 TABLET ORAL
Qty: 0 | Refills: 0 | DISCHARGE
Start: 2022-03-31

## 2022-03-31 RX ORDER — MIDAZOLAM HYDROCHLORIDE 1 MG/ML
4 INJECTION, SOLUTION INTRAMUSCULAR; INTRAVENOUS ONCE
Refills: 0 | Status: DISCONTINUED | OUTPATIENT
Start: 2022-03-31 | End: 2022-03-31

## 2022-03-31 RX ORDER — MIDAZOLAM HYDROCHLORIDE 1 MG/ML
2 INJECTION, SOLUTION INTRAMUSCULAR; INTRAVENOUS ONCE
Refills: 0 | Status: DISCONTINUED | OUTPATIENT
Start: 2022-03-31 | End: 2022-03-31

## 2022-03-31 RX ADMIN — Medication 50 MILLIGRAM(S): at 05:35

## 2022-03-31 RX ADMIN — Medication 2 MILLIGRAM(S): at 00:12

## 2022-03-31 RX ADMIN — LACOSAMIDE 120 MILLIGRAM(S): 50 TABLET ORAL at 11:23

## 2022-03-31 RX ADMIN — QUETIAPINE FUMARATE 100 MILLIGRAM(S): 200 TABLET, FILM COATED ORAL at 05:16

## 2022-03-31 RX ADMIN — Medication 650 MILLIGRAM(S): at 05:39

## 2022-03-31 RX ADMIN — Medication 2 MILLIGRAM(S): at 05:35

## 2022-03-31 RX ADMIN — MOMETASONE FUROATE 1 PUFF(S): 220 INHALANT RESPIRATORY (INHALATION) at 11:24

## 2022-03-31 RX ADMIN — DIVALPROEX SODIUM 500 MILLIGRAM(S): 500 TABLET, DELAYED RELEASE ORAL at 08:00

## 2022-03-31 RX ADMIN — MIDAZOLAM HYDROCHLORIDE 2 MILLIGRAM(S): 1 INJECTION, SOLUTION INTRAMUSCULAR; INTRAVENOUS at 13:34

## 2022-03-31 RX ADMIN — Medication 37.5 MICROGRAM(S): at 05:19

## 2022-03-31 RX ADMIN — Medication 325 MILLIGRAM(S): at 11:24

## 2022-03-31 RX ADMIN — Medication 25 MILLIGRAM(S): at 15:32

## 2022-03-31 RX ADMIN — Medication 650 MILLIGRAM(S): at 05:18

## 2022-03-31 RX ADMIN — ARIPIPRAZOLE 30 MILLIGRAM(S): 15 TABLET ORAL at 11:25

## 2022-03-31 RX ADMIN — CYCLOBENZAPRINE HYDROCHLORIDE 5 MILLIGRAM(S): 10 TABLET, FILM COATED ORAL at 08:10

## 2022-03-31 RX ADMIN — Medication 50 MILLIGRAM(S): at 11:48

## 2022-03-31 RX ADMIN — Medication 2 MILLIGRAM(S): at 08:35

## 2022-03-31 RX ADMIN — APIXABAN 10 MILLIGRAM(S): 2.5 TABLET, FILM COATED ORAL at 05:17

## 2022-03-31 RX ADMIN — CYCLOBENZAPRINE HYDROCHLORIDE 5 MILLIGRAM(S): 10 TABLET, FILM COATED ORAL at 15:32

## 2022-03-31 RX ADMIN — Medication 2 MILLIGRAM(S): at 15:42

## 2022-03-31 RX ADMIN — MIDAZOLAM HYDROCHLORIDE 4 MILLIGRAM(S): 1 INJECTION, SOLUTION INTRAMUSCULAR; INTRAVENOUS at 11:25

## 2022-03-31 RX ADMIN — PANTOPRAZOLE SODIUM 40 MILLIGRAM(S): 20 TABLET, DELAYED RELEASE ORAL at 11:24

## 2022-03-31 RX ADMIN — CHLORHEXIDINE GLUCONATE 1 APPLICATION(S): 213 SOLUTION TOPICAL at 05:39

## 2022-03-31 NOTE — BH CONSULTATION LIAISON PROGRESS NOTE - NSBHCONSULTDISCUSS_PSY_A_CORE
GENERAL SURGERY PROGRESS NOTE     MARCELLUS WILSON  53y  Female  Hospital day :9d  POD:  Procedure: Lysis, adhesions, pelvis, laparoscopic  Cystoscopy, diagnostic  Laparoscopic total hysterectomy with salpingo-oophorectomy for uterus greater than 250 grams    OVERNIGHT EVENTS:  NGT removed. +flatus, BM. Ambulating and voiding. Tolerating CLD. Continues to have low grade temperature.     T(F): 99.7 (09-26-19 @ 08:03), Max: 99.7 (09-26-19 @ 08:03)  HR: 94 (09-26-19 @ 08:03) (92 - 96)  BP: 133/79 (09-26-19 @ 08:03) (133/79 - 149/65)  ABP: --  ABP(mean): --  RR: 18 (09-26-19 @ 08:03) (18 - 18)  SpO2: --    DIET/FLUIDS: dextrose 5% + sodium chloride 0.9% with potassium chloride 20 mEq/L 1000 milliLiter(s) IV Continuous <Continuous>  GI proph:  pantoprazole  Injectable 40 milliGRAM(s) IV Push daily    ABx: levoFLOXacin IVPB 750 milliGRAM(s) IV Intermittent every 24 hours  levoFLOXacin IVPB      metroNIDAZOLE  IVPB 500 milliGRAM(s) IV Intermittent every 8 hours  metroNIDAZOLE  IVPB          PHYSICAL EXAM:  GENERAL: NAD, well-appearing  CHEST/LUNG: Clear to auscultation bilaterally  HEART: Regular rate and rhythm  ABDOMEN: Soft, TTP in lower abdomen, Nondistended;   EXTREMITIES:  No clubbing, cyanosis, or edema    LABS  Labs:  CAPILLARY BLOOD GLUCOSE                     8.8    15.87 )-----------( 457      ( 26 Sep 2019 09:46 )             29.2       Auto Neutrophil %: 85.2 % (09-26-19 @ 09:46)    09-26    146  |  110  |  13  ----------------------------<  136<H>  3.5   |  24  |  0.7    Calcium, Total Serum: 7.6 mg/dL (09-26-19 @ 09:46)  Lactate, Blood: 1.2 mmol/L (09-23-19 @ 14:30)    ABG - ( 23 Sep 2019 02:00 )  pH: 7.55  /  pCO2: 44    /  pO2: 89    / HCO3: 38    / Base Excess: 14.0  /  SaO2: 97        ABG - ( 22 Sep 2019 13:31 )  pH: 7.55  /  pCO2: 40    /  pO2: 70    / HCO3: 35    / Base Excess: 11.5  /  SaO2: 95
yes
yes

## 2022-03-31 NOTE — PROVIDER CONTACT NOTE (EICU) - ASSESSMENT
Pt crying out in bed, asking for her medication. Upon discussion with patient, she states she needs her haldol and she takes 5mg at night and in the morning, also requesting her phone. RN readjusted pt in bed, gave her the phone with improvement to agitation.
1. Recurrent Seizures.  2. Factor V Leiden

## 2022-03-31 NOTE — BH CONSULTATION LIAISON PROGRESS NOTE - NSICDXBHSECONDARYDX_PSY_ALL_CORE
Intellectual disability   F79  Explosive personality disorder   F60.3  
Intellectual disability   F79  Explosive personality disorder   F60.3

## 2022-03-31 NOTE — CHART NOTE - NSCHARTNOTESELECT_GEN_ALL_CORE
CODE GREY/Event Note
Event Note
Rapid Response
Transfer Note
2nd touch
Event Note
Seizure/Rapid Response

## 2022-03-31 NOTE — PROGRESS NOTE ADULT - ASSESSMENT
36F with BMI 39 PMH Factor 5 Leiden Deficiency, DVT/PE on Xarelto, seizure disorder vs conversion disorder, asthma, endometriosis, extensive psychiatric history (schizoaffective disorder vs bipolar disorder vs other mood/psychotic disorders), personality disorder, intellectual disability, Cocaine use disorder with hx of aggression and psych admissions presents to ER with b/l LE edema and SOB x 2 weeks. Found to have deep venous thrombosis in the left popliteal vein. Admitted to medical service for acute DVT and likely PE. Course complicated by seizures x4 on 3/27. Was awaiting transfer to neuro unit at [facility] but no bed availability and was transferred to ICU for monitoring. In ICU pt with agitation, combative behavior placed on precedex for sedation.     DX: L leg DVT, factor 5 Leiden Deficiency, seizure disorder, impulse control disorder, behavioral agitation, explosive personality disorder, severe agitation and combativeness requiring sedation         NEURO  cont with antiepileptics: depacon  vimpat d/tone per neuro  pt has not had any clinical seizures since admission to ICU  there is known non compliance with meds (confirmed with her group home nurse)  no need for EEG at present time  pt awake and responsive  appreciate psych eval  cont psych meds: thorazine as needed, ativan as needed, Seroquel, latuda, abilify   pt with baseline aggressive behavior  she has been known to make verbal threats of harm to staff at hospital and staff at group home in attempt to obtain / coerce staff to getting things she desires.  she appears to have insight that it would be wrong to physically assault people and is aware that law enforcement would be called if she did such a thing  pt has been off restraints and off continuous IV drip sedation         HEME  factor 5 leiden deficiency  L knee DVT  cont with eliquis      PULM  respiratory status stable at present time      ID  asymptomatic bacteruria  no fevers  no leukocytosis  no urinary complaints  monitor off antibiotics    GEN  pt for discharge back to group home today  group home aware of discharge  pt is OPWDD and needs to return to her residence  pt now willing to return to her group home

## 2022-03-31 NOTE — PROGRESS NOTE ADULT - PROVIDER SPECIALTY LIST ADULT
Hospitalist
Neurology
Neurology
Critical Care
Neurology
Neurology
Hospitalist
Critical Care
Critical Care
Heme/Onc
Heme/Onc

## 2022-03-31 NOTE — CHART NOTE - NSCHARTNOTEFT_GEN_A_CORE
Pt continued to be agitated overnight, yelling, cursing, and screaming out loud, disrupting the unit and other patients.  Pt is asking for IV benadryl and stating that she has never received it and we are not helping her when she asks.  She states that the benadryl needs to be mixed with her other medications. She asks for haldol and ativan together.  Kicking and trying to grab at IV pole - restraints remain in place.   Pt stated that she is going to call the police since we are harassing her since we are not giving her the "right" medications.   Pt received IM thorazine and IM ativan.  Will continue to monitor, hold benadryl given drug seeking behavior.  Psych follow up.

## 2022-03-31 NOTE — BH CONSULTATION LIAISON PROGRESS NOTE - NSBHCONSULTFOLLOWAFTERCARE_PSY_A_CORE FT
group home staff should administer Patient her medications; do a mouth check as well since seizures are due to her not taking her anti-convulsants. Given Patient's functioning level, she should not be managing her own medications. Has outpatient services set up already and does not need any referrals. 
group home staff should administer Patient her medications; do a mouth check as well since seizures are due to her not taking her anti-convulsants. Given Patient's functioning level, she should not be managing her own medications. Has outpatient services set up already and does not need any referrals.

## 2022-03-31 NOTE — BH CONSULTATION LIAISON PROGRESS NOTE - NSBHCHARTREVIEWLAB_PSY_A_CORE FT
03-29    142  |  113<H>  |  16  ----------------------------<  132<H>  4.4   |  22  |  0.69    Ca    9.1      29 Mar 2022 03:14  Phos  4.0     03-29  Mg     2.4     03-29    TPro  7.0  /  Alb  3.1<L>  /  TBili  0.2  /  DBili  x   /  AST  21  /  ALT  49  /  AlkPhos  49  03-29  
no new labs today

## 2022-03-31 NOTE — BH CONSULTATION LIAISON PROGRESS NOTE - NSBHFUPINTERVALHXFT_PSY_A_CORE
The patient's Mother is calling because she is requesting  A prescription for 90 days for the amphetamine-dextroamphetamine (ADDERALL XR) 20 MG 24 hr capsule so that they may mail it into the [facility].   The patient will be out of his medication in about 2 weeks. Please call and advise.  
Patient downgraded to floors today. No subsequent seizures; long hx of medication noncompliance with her anti-convulsants leading to breakthrough seizures. Multiple Code Greys in ICU, combative behavior consistent with prior behavioral patterns in order to have her needs met immediately etc. Patient for example demanded a quesadilla with resulting less acting out behaviors once food of choice was delivered. 
Patient's transfer home last night did not happen due to her behavior and EMS refusing to take her. No subsequent seizures; long hx of medication noncompliance with her anti-convulsants leading to breakthrough seizures. Multiple Code Greys in ICU, combative behavior consistent with prior behavioral patterns in order to have her needs met immediately etc. Patient for example demanded a quesadilla, IV Benadryl, with resulting less acting out behaviors once food of choice was delivered. LONG history of making verbal threats towards self and others in order to get what she needs/have her needs met including hospital staff which includes trying to avoid returning to group home as she dislikes structure, having to follow rules, share with peers.

## 2022-03-31 NOTE — PROGRESS NOTE ADULT - SUBJECTIVE AND OBJECTIVE BOX
24 hr events:  no seizure activities     pt overnight with multiple code kevin due to agitation and outbursts and combative behavior    she refused to leave hospital yesterday night and stated she would return to her housing "in the morning"     she has been drug seeking asking for pushes of benadryl with pushes of benzo (ativan) or haldol to give in conjunction to the IV benadryl  pt still with periods of combativeness however this is her baseline  she initially refused to be discharged this morning stating she "can't walk and her legs are too weak" however throughout her hospital course she has been able to kick or attempt to kick her legs without difficulty and swing them off the bed and she was witnessed to stand and take steps to go to the commode by nursing staff and able to stand up from bed during her periods of agitation  however this afternoon after speaking to patient and reminding her that she refused to leave yesterday night but states she will go back to her home the following morning (which is today) - pt now amendable to going back to her group home only after she gets her benadryl prior to transport and that they take her to her room          ## Labs:  CBC:                        12.6   6.47  )-----------( 179      ( 30 Mar 2022 03:11 )             38.9     Chem:  Comprehensive Metabolic Panel in AM (03.29.22 @ 03:14)    Sodium, Serum: 142 mmol/L    Potassium, Serum: 4.4 mmol/L    Chloride, Serum: 113 mmol/L    Carbon Dioxide, Serum: 22 mmol/L    Anion Gap, Serum: 7 mmol/L    Blood Urea Nitrogen, Serum: 16 mg/dL    Creatinine, Serum: 0.69 mg/dL    Glucose, Serum: 132 mg/dL    Calcium, Total Serum: 9.1 mg/dL    Protein Total, Serum: 7.0 gm/dL    Albumin, Serum: 3.1 g/dL    Bilirubin Total, Serum: 0.2 mg/dL    Alkaline Phosphatase, Serum: 49 U/L    Aspartate Aminotransferase (AST/SGOT): 21 U/L    Alanine Aminotransferase (ALT/SGPT): 49 U/L        ## Medications:    ALBUTerol    90 MICROgram(s) HFA Inhaler 2 Puff(s) Inhalation every 6 hours PRN  mometasone 220 MICROgram(s) Inhaler 1 Puff(s) Inhalation daily    levothyroxine Injectable 37.5 MICROGram(s) IV Push <User Schedule>    apixaban 10 milliGRAM(s) Oral every 12 hours    pantoprazole  Injectable 40 milliGRAM(s) IV Push daily    acetaminophen     Tablet .. 650 milliGRAM(s) Oral every 6 hours PRN  ARIPiprazole 30 milliGRAM(s) Oral daily  chlorproMAZINE    Injectable 50 milliGRAM(s) IntraMuscular every 6 hours PRN  cyclobenzaprine 5 milliGRAM(s) Oral three times a day PRN  diVALproex ER 1000 milliGRAM(s) Oral at bedtime  diVALproex  milliGRAM(s) Oral <User Schedule>  lacosamide IVPB 100 milliGRAM(s) IV Intermittent <User Schedule>  LORazepam   Injectable 2 milliGRAM(s) IntraMuscular every 6 hours PRN  lurasidone 40 milliGRAM(s) Oral daily  QUEtiapine 100 milliGRAM(s) Oral two times a day      ## Vitals:  T(C): 36.9 (03-31-22 @ 16:10), Max: 36.9 (03-31-22 @ 16:10)  HR: 118 (03-31-22 @ 16:10) (97 - 145)  BP: 112/71 (03-31-22 @ 16:10) (90/48 - 124/93)  BP(mean): 91 (03-31-22 @ 12:00) (46 - 103)  RR: 21 (03-31-22 @ 16:10) (14 - 30)  SpO2: 98% (03-31-22 @ 16:10) (92% - 100%)        03-30 @ 07:01  -  03-31 @ 07:00  --------------------------------------------------------  IN: 50 mL / OUT: 600 mL / NET: -550 mL          ## P/E:  Gen: young female, irritable, bursts of crying and shouting with bursts of aggression in bed  HEENT: EOMI, sclera white  Resp: unable to (pt not cooperative with exam)  CVS: unable to (pt not cooperative with exam)  Abd: unable to (pt not cooperative with exam)  Ext: no c/c/e  Neuro: A&Ox3, irritable, intermittent aggressiveness, moves all extremities equally     CENTRAL LINE: [ ] YES [x ] NO  LOCATION:   DATE INSERTED:  REMOVE: [ ] YES [ ] NO      LOPEZ: [ ] YES [x ] NO    DATE INSERTED:  REMOVE:  [ ] YES [ ] NO      A-LINE:  [ ] YES [x ] NO  LOCATION:   DATE INSERTED:  REMOVE:  [ ] YES [ ] NO  EXPLAIN:      CODE STATUS: [x ] full code  [ ] DNR  [ ] DNI  [ ] MOLST  Goals of care discussion: [ ] yes 
HPI:        37 y/o obese female with a PMHx of Factor 5 Leiden Deficiency, Seizures, DVT/PE on Xarelto Endometriosis, Asthma presents to the ED c/o B/L LE edema and SOB x 2 weeks. Pt reports she noted LE edema and redness around R ankle; as well as JOSE,; she was seen in this ED 2 weeks ago and Rx keflex for cellulitis at that time with minimal improvement. Pt also reports mild dry cough and chest pain w/ SOB for which she used her albuterol pump w/o improvement. Pt also reports feeling "hot and cold" at times. Of note pt reports she had been compliant with her Xarelto     (25 Mar 2022 04:48)      24 hr events: Continues to be a threat to self and staff. Continues to have multiple code kevin with and without precedex gtt.     ## ROS:  Unable to obtain    ## Vitals  ICU Vital Signs Last 24 Hrs  T(C): 36.4 (30 Mar 2022 03:30), Max: 36.6 (29 Mar 2022 23:00)  T(F): 97.6 (30 Mar 2022 03:30), Max: 97.8 (29 Mar 2022 23:00)  HR: 87 (30 Mar 2022 07:00) (43 - 118)  BP: 85/55 (30 Mar 2022 07:00) (65/51 - 120/80)  BP(mean): 63 (30 Mar 2022 07:00) (49 - 92)  ABP: --  ABP(mean): --  RR: 22 (30 Mar 2022 07:00) (15 - 25)  SpO2: 99% (30 Mar 2022 07:00) (83% - 100%)      ## Physical Exam:  Gen: Obese Female lying in bed, agitated  HEENT: NC/AT sclerae anicteric  Neuro: Awake, alert, moves all extremities  Psych: Agitated, uncooperative  Patient uncooperative with remainder of exam    ## Vent Data      ## Labs:  Chem:  03-29    142  |  113<H>  |  16  ----------------------------<  132<H>  4.4   |  22  |  0.69    Ca    9.1      29 Mar 2022 03:14  Phos  4.0     03-29  Mg     2.4     03-29    TPro  7.0  /  Alb  3.1<L>  /  TBili  0.2  /  DBili  x   /  AST  21  /  ALT  49  /  AlkPhos  49  03-29    LIVER FUNCTIONS - ( 29 Mar 2022 03:14 )  Alb: 3.1 g/dL / Pro: 7.0 gm/dL / ALK PHOS: 49 U/L / ALT: 49 U/L / AST: 21 U/L / GGT: x           CBC:                        12.6   6.47  )-----------( 179      ( 30 Mar 2022 03:11 )             38.9     Coags:          ## Cardiac        ## Blood Gas      #I/Os  I&O's Detail    29 Mar 2022 07:01  -  30 Mar 2022 07:00  --------------------------------------------------------  IN:    Dexmedetomidine: 284.6 mL    IV PiggyBack: 250 mL    Lactated Ringers: 2250 mL  Total IN: 2784.6 mL    OUT:    Voided (mL): 1600 mL  Total OUT: 1600 mL    Total NET: 1184.6 mL          ## Imaging:    ## Medications:  MEDICATIONS  (STANDING):  apixaban 10 milliGRAM(s) Oral every 12 hours  ARIPiprazole 30 milliGRAM(s) Oral daily  chlorhexidine 2% Cloths 1 Application(s) Topical <User Schedule>  ferrous    sulfate 325 milliGRAM(s) Oral daily  influenza   Vaccine 0.5 milliLiter(s) IntraMuscular once  lacosamide IVPB 100 milliGRAM(s) IV Intermittent <User Schedule>  lactated ringers. 1000 milliLiter(s) (100 mL/Hr) IV Continuous <Continuous>  levothyroxine Injectable 37.5 MICROGram(s) IV Push <User Schedule>  lurasidone 40 milliGRAM(s) Oral daily  mometasone 220 MICROgram(s) Inhaler 1 Puff(s) Inhalation daily  pantoprazole  Injectable 40 milliGRAM(s) IV Push daily  QUEtiapine 100 milliGRAM(s) Oral two times a day  valproate sodium IVPB 500 milliGRAM(s) IV Intermittent three times a day    MEDICATIONS  (PRN):  acetaminophen     Tablet .. 650 milliGRAM(s) Oral every 6 hours PRN Temp greater or equal to 38C (100.4F), Mild Pain (1 - 3)  ALBUTerol    90 MICROgram(s) HFA Inhaler 2 Puff(s) Inhalation every 6 hours PRN Shortness of Breath and/or Wheezing  chlorproMAZINE    Injectable 50 milliGRAM(s) IntraMuscular every 6 hours PRN agitation / aggression  cyclobenzaprine 5 milliGRAM(s) Oral three times a day PRN Muscle Spasm  diphenhydrAMINE 25 milliGRAM(s) Oral every 6 hours PRN Rash and/or Itching  hydrocortisone 2.5% Cream 1 Application(s) Topical every 6 hours PRN Rash and/or Itching  LORazepam   Injectable 2 milliGRAM(s) IntraMuscular every 6 hours PRN severe anxiety/agitation      
HPI:        37 y/o obese female with a PMHx of Factor 5 Leiden Deficiency, Seizures, DVT/PE on Xarelto Endometriosis, Asthma presents to the ED c/o B/L LE edema and SOB x 2 weeks. Pt reports she noted LE edema and redness around R ankle; as well as JOSE,; she was seen in this ED 2 weeks ago and Rx keflex for cellulitis at that time with minimal improvement. Pt also reports mild dry cough and chest pain w/ SOB for which she used her albuterol pump w/o improvement. Pt also reports feeling "hot and cold" at times. Of note pt reports she had been compliant with her Xarelto   (25 Mar 2022 04:48)    Allergies  latex (Blisters)  morphine (Unknown)  penicillin (Hives)  pineapple (Unknown)  Toradol (Rash)  Zofran (Hives)    MEDICATIONS  NEURO  Meds: acetaminophen     Tablet .. 650 milliGRAM(s) Oral every 6 hours PRN Temp greater or equal to 38C (100.4F), Mild Pain (1 - 3)  ARIPiprazole 30 milliGRAM(s) Oral daily  cyclobenzaprine 5 milliGRAM(s) Oral three times a day PRN Muscle Spasm  dexMEDEtomidine Infusion 0.2 MICROgram(s)/kG/Hr (5.85 mL/Hr) IV Continuous <Continuous>  lacosamide IVPB 100 milliGRAM(s) IV Intermittent every 12 hours  LORazepam   Injectable 2 milliGRAM(s) IV Push daily  lurasidone 40 milliGRAM(s) Oral daily  QUEtiapine 50 milliGRAM(s) Oral two times a day  topiramate 25 milliGRAM(s) Oral daily  valproate sodium IVPB 500 milliGRAM(s) IV Intermittent three times a day    RESPIRATORY  Meds: ALBUTerol    90 MICROgram(s) HFA Inhaler 2 Puff(s) Inhalation every 6 hours PRN Shortness of Breath and/or Wheezing  mometasone 220 MICROgram(s) Inhaler 1 Puff(s) Inhalation daily    GI/NUTRITION  Meds: pantoprazole    Tablet 40 milliGRAM(s) Oral before breakfast    GENITOURINARY  Meds: ferrous    sulfate 325 milliGRAM(s) Oral daily    HEMATOLOGIC  Meds: apixaban 10 milliGRAM(s) Oral every 12 hours    INFECTIOUS DISEASES  Meds: influenza   Vaccine 0.5 milliLiter(s) IntraMuscular once    ENDOCRINE  CAPILLARY BLOOD GLUCOSE  POCT Blood Glucose.: 81 mg/dL (27 Mar 2022 19:36)  POCT Blood Glucose.: 104 mg/dL (27 Mar 2022 15:37)  POCT Blood Glucose.: 90 mg/dL (27 Mar 2022 14:24)  POCT Blood Glucose.: 109 mg/dL (27 Mar 2022 12:57)  POCT Blood Glucose.: 103 mg/dL (26 Mar 2022 22:59)    Meds:   OTHER MEDICATIONS:  chlorhexidine 2% Cloths 1 Application(s) Topical <User Schedule>  hydrocortisone 2.5% Cream 1 Application(s) Topical every 6 hours PRN    Drug Dosing Weight  Height (cm): 172.7 (24 Mar 2022 21:24)  Weight (kg): 117 (25 Mar 2022 16:00)  BMI (kg/m2): 39.2 (25 Mar 2022 16:00)  BSA (m2): 2.28 (25 Mar 2022 16:00)    PAST MEDICAL & SURGICAL HISTORY:  Asthma  Seizure  Factor V Leiden  Bipolar disorder  Endometriosis  History of DVT in adulthood  No significant past surgical history    FAMILY HISTORY:  Family history of DVT (Mother)    ADVANCE DIRECTIVES: Full Code    REVIEW OF SYSTEMS: Unable to obtain due to pt's cognitive status.     Vital Signs Last 24 Hrs  T(C): 36.8 (27 Mar 2022 17:01), Max: 37.2 (27 Mar 2022 05:14)  T(F): 98.2 (27 Mar 2022 17:01), Max: 98.9 (27 Mar 2022 05:14)  HR: 97 (27 Mar 2022 17:01) (67 - 97)  BP: 113/72 (27 Mar 2022 17:01) (101/72 - 118/81)  RR: 17 (27 Mar 2022 17:01) (16 - 17)  SpO2: 96% (27 Mar 2022 17:01) (96% - 100%)    I&O's Detail    26 Mar 2022 07:01  -  27 Mar 2022 07:00  --------------------------------------------------------  IN:    Oral Fluid: 480 mL  Total IN: 480 mL    OUT:  Total OUT: 0 mL    Total NET: 480 mL    PHYSICAL EXAM  GENERAL: Pt lying in stretcher in NAD, well-groomed, well-developed. Drowsy.   HEENT: NCAT, PERRL, EOMI, moist mucous membranes. Neck supple, FROM, no JVD.   NERVOUS SYSTEM: Follows commands. Moving all four extremities, no focal neuro deficits; strength & sensation intact in b/l upper & lower extremities.  CHEST/LUNG: CTABL, no w/r/r  HEART: +S1S2, RRR, no m/r/g  ABDOMEN: Soft, nontender, nondistended; +BS  EXTREMITIES:  2+ peripheral pulses; no c/c/e  SKIN: No rashes or lesions    LABS:                        12.5   6.21  )-----------( 191      ( 27 Mar 2022 13:21 )             39.0   03-27    141  |  109<H>  |  19  ----------------------------<  84  3.7   |  26  |  0.98    Ca    9.3      27 Mar 2022 13:21  TPro  7.9  /  Alb  3.9  /  TBili  0.2  /  DBili  x   /  AST  17  /  ALT  33  /  AlkPhos  56  03-27    CAPILLARY BLOOD GLUCOSE  POCT Blood Glucose.: 81 mg/dL (27 Mar 2022 19:36)    RADIOLOGY:  < from: US Duplex Venous Lower Ext Complete, Bilateral (03.25.22 @ 03:00) >    IMPRESSION:  Deep venous thrombosis in the left popliteal vein, at the knee.  < end of copied text >  
24 hr events:  transfer to neuro unit at [facility] cancelled due to pt refusing transfer and pt's combative behavior upon arrival of EMS and neurology at tertiary center felt EMU not appropriate setting for patient in her current agitated combative state.  pt with 2 code greys overnight due to agitation, combativeness and pt throwing urinal at staff- given haldol, benadryl, ativan, and resumed back on precedex, placed in restraints as pt attempting to hit and strike staff    pt this morning with multiple code kevin. Found pt screaming profanity. pt repetitively hitting her arms against bed rails and stomping and hitting her legs against foot of bed kicking. redirected pt that she will hard herself if she continues to do so with which pt replied "I don't care" Pt pulled out her IV access.     IM haldol and ativan given as pt pulled out her IV access      ## ROS:  [ x] unable to obtain due to extreme agitation      ## Labs:  CBC:                        12.2   5.88  )-----------( 170      ( 29 Mar 2022 03:14 )             36.8     Chem:  03-29    142  |  113<H>  |  16  ----------------------------<  132<H>  4.4   |  22  |  0.69    Ca    9.1      29 Mar 2022 03:14  Phos  4.0     03-29  Mg     2.4     03-29    TPro  7.0  /  Alb  3.1<L>  /  TBili  0.2  /  DBili  x   /  AST  21  /  ALT  49  /  AlkPhos  49  03-29        ## Medications:  ALBUTerol    90 MICROgram(s) HFA Inhaler 2 Puff(s) Inhalation every 6 hours PRN  mometasone 220 MICROgram(s) Inhaler 1 Puff(s) Inhalation daily    levothyroxine Injectable 37.5 MICROGram(s) IV Push <User Schedule>    apixaban 10 milliGRAM(s) Oral every 12 hours    pantoprazole  Injectable 40 milliGRAM(s) IV Push daily    acetaminophen     Tablet .. 650 milliGRAM(s) Oral every 6 hours PRN  ARIPiprazole 30 milliGRAM(s) Oral daily  chlorproMAZINE    Injectable 50 milliGRAM(s) IntraMuscular every 6 hours PRN  cyclobenzaprine 5 milliGRAM(s) Oral three times a day PRN  dexMEDEtomidine Infusion 0.2 MICROgram(s)/kG/Hr IV Continuous <Continuous>  diphenhydrAMINE 25 milliGRAM(s) Oral every 6 hours PRN  lacosamide IVPB 100 milliGRAM(s) IV Intermittent <User Schedule>  LORazepam   Injectable 2 milliGRAM(s) IntraMuscular every 6 hours PRN  lurasidone 40 milliGRAM(s) Oral daily  QUEtiapine 100 milliGRAM(s) Oral two times a day  valproate sodium IVPB 500 milliGRAM(s) IV Intermittent three times a day      ## Vitals:  T(C): 36.4 (03-29-22 @ 16:00), Max: 36.4 (03-29-22 @ 16:00)  HR: 71 (03-29-22 @ 16:00) (41 - 92)  BP: 92/66 (03-29-22 @ 16:00) (84/55 - 129/83)  BP(mean): 72 (03-29-22 @ 16:00) (60 - 93)  RR: 16 (03-29-22 @ 16:00) (14 - 22)  SpO2: 97% (03-29-22 @ 12:00) (91% - 100%)        03-28 @ 07:01  -  03-29 @ 07:00  --------------------------------------------------------  IN: 1706.9 mL / OUT: 200 mL / NET: 1506.9 mL    03-29 @ 07:01  -  03-29 @ 17:27  --------------------------------------------------------  IN: 329 mL / OUT: 0 mL / NET: 329 mL          ## P/E: (limited due to pt's combativeness and willingness to participate in exam)  Gen: obese young female, agitated and easily irritable  HEENT: sclera white  Resp: unable to do  CVS: unable to do  Abd: unable to do  Ext: no edema, moving all extremities, no cyanosis  Neuro: awake, agitated, combative    CENTRAL LINE: [ ] YES [x ] NO  LOCATION:   DATE INSERTED:  REMOVE: [ ] YES [ ] NO      LOPEZ: [ ] YES [x ] NO    DATE INSERTED:  REMOVE:  [ ] YES [ ] NO      A-LINE:  [ ] YES [x ] NO  LOCATION:   DATE INSERTED:  REMOVE:  [ ] YES [ ] NO  EXPLAIN:    GLOBAL ISSUE/BEST PRACTICE:  Analgesia: n/a  Sedation:   HOB elevation: yes  Stress ulcer prophylaxis: n/a  VTE prophylaxis: on Eliquis for DVT   Oral Care: n/a  Glycemic control: n/a  Nutrition: po diet    CODE STATUS: [x ] full code  [ ] DNR  [ ] DNI  [ ] MOLST  Goals of care discussion: [ ] yes 
Neurology Progress note    Events noted. Code Gray x 3.     Neuro Exam: Alert. Combative. Moving all four extremities without weakness.     A/P:  Seizure vs. non-epileptic spells  DVT/PE  Factor V Leiden deficiency    - transfer to [facility] EMU for further characterization of convulsions was cancelled as patient refused  - recommend head imaging (CT head or MRI brain wwo diane)  - probable drug seeking. No benzos  - Recommend continuous video EEG if able  - continue Depakote 500mg TID  - discontinue Vimpat and Topamax. Avoid Keppra.   - Psychiatry follow up
Neurology Progress note    Events noted. Multiple Code Gray called.    Neuro Exam: Alert. Combative. Moving all four extremities without weakness.     A/P:  Seizure vs. non-epileptic spells  DVT/PE  Factor V Leiden deficiency    - no seizures noted. Unclear if her episodes on 3/27/22 were seizure or pseudoseizure.  - continue Depakote 1500mg per day  - discontinue Vimpat and Topamax. Avoid Keppra.   - Psychiatry follow up  - neuro stable. 
Neurology Progress note    Events noted. Multiple Code Gray called.    Neuro Exam: Alert. Combative. Moving all four extremities without weakness.     A/P:  Seizure vs. non-epileptic spells  DVT/PE  Factor V Leiden deficiency    - no seizures noted. Unclear if her episodes on 3/27/22 were seizure or pseudoseizure  - continue Depakote 1500mg per day  - discontinue Vimpat and Topamax. Avoid Keppra.   - Psychiatry follow up
Neurology Progress note    Events noted. Multiple episodes of convulsions noted. Patient on Precedex gtt overnight for behavioral changes. No seizure noted this AM.     Neuro Exam: Lethargic/sleeping. Does not open eyes to voice. Does not withdraw to pain. Pupils 2mm and reactive to light. VOR intact. No withdrawal to pain.     A/P:  Status epilepticus vs. non-epileptic spells  DVT/PE  Factor V Leiden deficiency    - transfer to [facility] EMU for further characterization of convulsions  - recommend head imaging (CT head or MRI brain wwo diane)  - continue current seizure medications  - will defer to EMU team for further management. 
Patient is a 36y old  Female who presents with a chief complaint of Acute DVT (26 Mar 2022 09:39)    INTERVAL HPI/OVERNIGHT EVENTS: no events    MEDICATIONS  (STANDING):  apixaban 10 milliGRAM(s) Oral every 12 hours  ARIPiprazole 30 milliGRAM(s) Oral daily  diVALproex  milliGRAM(s) Oral two times a day  ferrous    sulfate 325 milliGRAM(s) Oral daily  influenza   Vaccine 0.5 milliLiter(s) IntraMuscular once  levothyroxine 75 MICROGram(s) Oral daily  lurasidone 40 milliGRAM(s) Oral daily  metoclopramide Injectable 10 milliGRAM(s) IV Push once  mometasone 220 MICROgram(s) Inhaler 1 Puff(s) Inhalation daily  pantoprazole    Tablet 40 milliGRAM(s) Oral before breakfast  QUEtiapine 50 milliGRAM(s) Oral two times a day  topiramate 25 milliGRAM(s) Oral daily    MEDICATIONS  (PRN):  acetaminophen     Tablet .. 650 milliGRAM(s) Oral every 6 hours PRN Temp greater or equal to 38C (100.4F), Mild Pain (1 - 3)  ALBUTerol    90 MICROgram(s) HFA Inhaler 2 Puff(s) Inhalation every 6 hours PRN Shortness of Breath and/or Wheezing  cyclobenzaprine 5 milliGRAM(s) Oral three times a day PRN Muscle Spasm  hydrocortisone 2.5% Cream 1 Application(s) Topical every 6 hours PRN Rash and/or Itching    Allergies    latex (Blisters)  morphine (Unknown)  penicillin (Hives)  penicillin (Other)  pineapple (Unknown)  Toradol (Rash)  Toradol (Unknown)  Zofran (Hives)  Zofran (Unknown)    Intolerances      REVIEW OF SYSTEMS:  All other systems reviewed and are negative    Vital Signs Last 24 Hrs  T(C): 36.9 (26 Mar 2022 05:06), Max: 36.9 (25 Mar 2022 16:00)  T(F): 98.4 (26 Mar 2022 05:06), Max: 98.4 (25 Mar 2022 16:00)  HR: 70 (26 Mar 2022 05:06) (65 - 82)  BP: 106/72 (26 Mar 2022 05:06) (102/68 - 114/74)  BP(mean): --  RR: 16 (26 Mar 2022 05:06) (16 - 18)  SpO2: 99% (26 Mar 2022 05:06) (96% - 100%)  Daily     Daily Weight in k.5 (26 Mar 2022 05:06)  I&O's Summary    25 Mar 2022 07:01  -  26 Mar 2022 07:00  --------------------------------------------------------  IN: 0 mL / OUT: 150 mL / NET: -150 mL      CAPILLARY BLOOD GLUCOSE        PHYSICAL EXAM:  GENERAL: NAD,    HEAD:  Atraumatic, Normocephalic  EYES: EOMI, PERRLA, conjunctiva and sclera clear  ENMT: No tonsillar erythema, exudates, or enlargement; Moist mucous membranes, Good dentition, No lesions  NECK: Supple, No JVD, Normal thyroid  NERVOUS SYSTEM:  Alert & Oriented X3, Good concentration; Motor Strength 5/5 B/L upper and lower extremities; DTRs 2+ intact and symmetric  CHEST/LUNG: Clear to percussion bilaterally; No rales, rhonchi, wheezing, or rubs  HEART: Regular rate and rhythm; No murmurs, rubs, or gallops  ABDOMEN: Soft, Nontender, Nondistended; Bowel sounds present  EXTREMITIES:  2+ Peripheral Pulses, No clubbing, cyanosis, or edema  LYMPH: No lymphadenopathy noted  SKIN: No rashes or lesions    Labs                          11.2   4.83  )-----------( 164      ( 25 Mar 2022 00:31 )             36.1     03-25    142  |  112<H>  |  14  ----------------------------<  85  4.5   |  25  |  0.81    Ca    9.1      25 Mar 2022 00:31    TPro  7.7  /  Alb  3.6  /  TBili  0.1<L>  /  DBili  x   /  AST  17  /  ALT  21  /  AlkPhos  49  03-25      CARDIAC MARKERS ( 25 Mar 2022 00:31 )  x     / x     / 80 U/L / x     / x          Urinalysis Basic - ( 25 Mar 2022 06:57 )    Color: Yellow / Appearance: Clear / S.010 / pH: x  Gluc: x / Ketone: Trace  / Bili: Negative / Urobili: Negative mg/dL   Blood: x / Protein: 15 mg/dL / Nitrite: Negative   Leuk Esterase: Trace / RBC: 3-5 /HPF / WBC 3-5   Sq Epi: x / Non Sq Epi: Many / Bacteria: Moderate        Culture - Urine (collected 25 Mar 2022 10:22)  Source: Clean Catch Clean Catch (Midstream)  Preliminary Report (26 Mar 2022 08:58):    50,000 - 99,000 CFU/mL Gram positive organisms                DVT prophylaxis: > Lovenox 40mg SQ daily  > Heparin   > SCD's
events noted, patient weak looking +    Vital Signs Last 24 Hrs  T(C): 36.2 (28 Mar 2022 07:30), Max: 36.9 (27 Mar 2022 11:34)  T(F): 97.2 (28 Mar 2022 07:30), Max: 98.5 (27 Mar 2022 11:34)  HR: 73 (28 Mar 2022 08:00) (63 - 97)  BP: 96/68 (28 Mar 2022 08:00) (91/62 - 113/72)  BP(mean): 73 (28 Mar 2022 08:00) (68 - 82)  RR: 20 (28 Mar 2022 08:00) (14 - 27)  SpO2: 96% (28 Mar 2022 08:00) (94% - 100%)    PHYSICAL EXAM:    general - weak looking +  HEENT - No Icterus  CVS - RRR  RS - AE B/L  Abd - soft, NT  Ext - Pulses +        LABS:                        11.5   5.99  )-----------( 167      ( 28 Mar 2022 08:04 )             36.3     03-28    139  |  110<H>  |  13  ----------------------------<  90  4.0   |  22  |  0.88    Ca    9.1      28 Mar 2022 08:04  Phos  3.3     03-28  Mg     2.3     03-28    TPro  6.7  /  Alb  3.1<L>  /  TBili  0.2  /  DBili  x   /  AST  20  /  ALT  37  /  AlkPhos  44  03-28          Culture - Blood (collected 25 Mar 2022 11:18)  Source: .Blood Blood-Peripheral  Preliminary Report (26 Mar 2022 12:01):    No growth to date.    Culture - Blood (collected 25 Mar 2022 11:18)  Source: .Blood Blood-Peripheral  Preliminary Report (26 Mar 2022 12:01):    No growth to date.    Culture - Urine (collected 25 Mar 2022 10:22)  Source: Clean Catch Clean Catch (Midstream)  Final Report (27 Mar 2022 13:54):    50,000 - 99,000 CFU/mL Enterococcus faecium  Organism: Enterococcus faecium (27 Mar 2022 13:54)  Organism: Enterococcus faecium (27 Mar 2022 13:54)        
24 hr events:  transferred to ICU overnight for recurrent seizures   was placed on precedex for agitation  this morning noted to be somnolent on precedex drip  precedex held this morning and pt subsequently became aggressive, combative, swinging at staff and attempting to bite staff, then attempted self harm by trying to bite own fingers and repetitively thrashing and throwing her head backwards onto the bed  sedated with haldol 5mg IVP and versed 2mg IVP      ## ROS:  [x ] unable to obtain due to agitation      ## Labs:  CBC:                        11.5   5.99  )-----------( 167      ( 28 Mar 2022 08:04 )             36.3     Chem:  03-28    139  |  110<H>  |  13  ----------------------------<  90  4.0   |  22  |  0.88    Ca    9.1      28 Mar 2022 08:04  Phos  3.3     03-28  Mg     2.3     03-28    TPro  6.7  /  Alb  3.1<L>  /  TBili  0.2  /  AST  20  /  ALT  37  /  AlkPhos  44  03-28    Culture - Blood (03.25.22 @ 11:18)    Specimen Source: .Blood Blood-Peripheral    Culture Results: No growth to date.      Culture - Urine (03.25.22 @ 10:22)    Specimen Source: Clean Catch Clean Catch (Midstream)    Culture Results: 50,000 - 99,000 CFU/mL Enterococcus faecium        ## Imaging:  LE duplex < from: US Duplex Venous Lower Ext Complete, Bilateral (03.25.22 @ 03:00) >  Deep venous thrombosis in the left popliteal vein, at the knee.      ## Medications:  ALBUTerol    90 MICROgram(s) HFA Inhaler 2 Puff(s) Inhalation every 6 hours PRN  mometasone 220 MICROgram(s) Inhaler 1 Puff(s) Inhalation daily    levothyroxine 75 MICROGram(s) Oral daily    apixaban 10 milliGRAM(s) Oral every 12 hours    pantoprazole    Tablet 40 milliGRAM(s) Oral before breakfast    acetaminophen     Tablet .. 650 milliGRAM(s) Oral every 6 hours PRN  ARIPiprazole 30 milliGRAM(s) Oral daily  cyclobenzaprine 5 milliGRAM(s) Oral three times a day PRN  dexMEDEtomidine Infusion 0.2 MICROgram(s)/kG/Hr IV Continuous <Continuous>  diphenhydrAMINE 25 milliGRAM(s) Oral every 6 hours PRN  lacosamide IVPB 100 milliGRAM(s) IV Intermittent <User Schedule>  lurasidone 40 milliGRAM(s) Oral daily  QUEtiapine 50 milliGRAM(s) Oral two times a day  topiramate 25 milliGRAM(s) Oral daily  valproate sodium IVPB 500 milliGRAM(s) IV Intermittent three times a day      ## Vitals:  T(C): 36.2 (03-28-22 @ 12:00), Max: 36.8 (03-27-22 @ 17:01)  HR: 54 (03-28-22 @ 13:00) (52 - 141)  BP: 117/89 (03-28-22 @ 13:00) (91/62 - 145/85)  BP(mean): 95 (03-28-22 @ 13:00) (68 - 99)  RR: 20 (03-28-22 @ 13:00) (14 - 27)  SpO2: 97% (03-28-22 @ 13:00) (94% - 100%)        03-27 @ 07:01  -  03-28 @ 07:00  --------------------------------------------------------  IN: 116.1 mL / OUT: 1000 mL / NET: -883.9 mL    03-28 @ 07:01  -  03-28 @ 13:44  --------------------------------------------------------  IN: 76 mL / OUT: 0 mL / NET: 76 mL          ## P/E:  Gen: young female, in bed currently sedated, agitated/combative this morning  HEENT: PERRL, sclera white  Resp: CTA B/L   CVS: RRR  Abd: soft NT/ND +BS  Ext: no c/c/e  Neuro: moves all extremities    CENTRAL LINE: [ ] YES [x ] NO  LOCATION:   DATE INSERTED:  REMOVE: [ ] YES [ ] NO      LOPEZ: [ ] YES [x ] NO    DATE INSERTED:  REMOVE:  [ ] YES [ ] NO      A-LINE:  [ ] YES [x ] NO  LOCATION:   DATE INSERTED:  REMOVE:  [ ] YES [ ] NO  EXPLAIN:    GLOBAL ISSUE/BEST PRACTICE:  Analgesia: n/a  Sedation: precedex  HOB elevation: yes  Stress ulcer prophylaxis: n/a  VTE prophylaxis: on apixaban   Oral Care: n/a  Glycemic control: n/a  Nutrition: po diet    CODE STATUS: [x ] full code  [ ] DNR  [ ] DNI  [ ] MOLST  Goals of care discussion: [ ] yes 
Patient is a 36y old  Female who presents with a chief complaint of Acute DVT (27 Mar 2022 15:54)    INTERVAL HPI/OVERNIGHT EVENTS:    MEDICATIONS  (STANDING):  apixaban 10 milliGRAM(s) Oral every 12 hours  ARIPiprazole 30 milliGRAM(s) Oral daily  ferrous    sulfate 325 milliGRAM(s) Oral daily  influenza   Vaccine 0.5 milliLiter(s) IntraMuscular once  lacosamide Injectable 200 milliGRAM(s) IV Push once  lacosamide IVPB 100 milliGRAM(s) IV Intermittent every 12 hours  levothyroxine 75 MICROGram(s) Oral daily  LORazepam   Injectable 2 milliGRAM(s) IV Push daily  LORazepam   Injectable 2 milliGRAM(s) IV Push once  lurasidone 40 milliGRAM(s) Oral daily  metoclopramide Injectable 10 milliGRAM(s) IV Push once  mometasone 220 MICROgram(s) Inhaler 1 Puff(s) Inhalation daily  pantoprazole    Tablet 40 milliGRAM(s) Oral before breakfast  QUEtiapine 50 milliGRAM(s) Oral two times a day  topiramate 25 milliGRAM(s) Oral daily  valproate sodium IVPB 500 milliGRAM(s) IV Intermittent three times a day  valproate sodium IVPB 500 milliGRAM(s) IV Intermittent once    MEDICATIONS  (PRN):  acetaminophen     Tablet .. 650 milliGRAM(s) Oral every 6 hours PRN Temp greater or equal to 38C (100.4F), Mild Pain (1 - 3)  ALBUTerol    90 MICROgram(s) HFA Inhaler 2 Puff(s) Inhalation every 6 hours PRN Shortness of Breath and/or Wheezing  brivaracetam  Injectable 100 milliGRAM(s) IV Push once PRN If another seizure occurs / No more Benzos  cyclobenzaprine 5 milliGRAM(s) Oral three times a day PRN Muscle Spasm  diphenhydrAMINE 25 milliGRAM(s) Oral every 6 hours PRN Rash and/or Itching  hydrocortisone 2.5% Cream 1 Application(s) Topical every 6 hours PRN Rash and/or Itching    Allergies    latex (Blisters)  morphine (Unknown)  penicillin (Hives)  penicillin (Other)  pineapple (Unknown)  Toradol (Rash)  Toradol (Unknown)  Zofran (Hives)  Zofran (Unknown)    Intolerances      REVIEW OF SYSTEMS:  All other systems reviewed and are negative    Vital Signs Last 24 Hrs  T(C): 36.9 (27 Mar 2022 11:34), Max: 37.2 (26 Mar 2022 17:03)  T(F): 98.5 (27 Mar 2022 11:34), Max: 98.9 (26 Mar 2022 17:03)  HR: 94 (27 Mar 2022 11:34) (67 - 94)  BP: 101/72 (27 Mar 2022 11:34) (101/70 - 118/81)  BP(mean): --  RR: 16 (27 Mar 2022 11:34) (16 - 17)  SpO2: 97% (27 Mar 2022 11:34) (97% - 100%)  Daily     Daily   I&O's Summary    26 Mar 2022 07:01  -  27 Mar 2022 07:00  --------------------------------------------------------  IN: 480 mL / OUT: 0 mL / NET: 480 mL      CAPILLARY BLOOD GLUCOSE      POCT Blood Glucose.: 104 mg/dL (27 Mar 2022 15:37)  POCT Blood Glucose.: 90 mg/dL (27 Mar 2022 14:24)  POCT Blood Glucose.: 109 mg/dL (27 Mar 2022 12:57)  POCT Blood Glucose.: 103 mg/dL (26 Mar 2022 22:59)    PHYSICAL EXAM:  GENERAL: NAD,    HEAD:  Atraumatic, Normocephalic  EYES: EOMI, PERRLA, conjunctiva and sclera clear  ENMT: No tonsillar erythema, exudates, or enlargement; Moist mucous membranes, Good dentition, No lesions  NECK: Supple, No JVD, Normal thyroid  NERVOUS SYSTEM:  Alert & Oriented X3, Good concentration; Motor Strength 5/5 B/L upper and lower extremities; DTRs 2+ intact and symmetric  CHEST/LUNG: Clear to percussion bilaterally; No rales, rhonchi, wheezing, or rubs  HEART: Regular rate and rhythm; No murmurs, rubs, or gallops  ABDOMEN: Soft, Nontender, Nondistended; Bowel sounds present  EXTREMITIES:  2+ Peripheral Pulses, No clubbing, cyanosis, or edema  LYMPH: No lymphadenopathy noted  SKIN: No rashes or lesions    Labs                          12.5   6.21  )-----------( 191      ( 27 Mar 2022 13:21 )             39.0     03-27    141  |  109<H>  |  19  ----------------------------<  84  3.7   |  26  |  0.98    Ca    9.3      27 Mar 2022 13:21    TPro  7.9  /  Alb  3.9  /  TBili  0.2  /  DBili  x   /  AST  17  /  ALT  33  /  AlkPhos  56  03-27              Culture - Blood (collected 25 Mar 2022 11:18)  Source: .Blood Blood-Peripheral  Preliminary Report (26 Mar 2022 12:01):    No growth to date.    Culture - Blood (collected 25 Mar 2022 11:18)  Source: .Blood Blood-Peripheral  Preliminary Report (26 Mar 2022 12:01):    No growth to date.    Culture - Urine (collected 25 Mar 2022 10:22)  Source: Clean Catch Clean Catch (Midstream)  Final Report (27 Mar 2022 13:54):    50,000 - 99,000 CFU/mL Enterococcus faecium  Organism: Enterococcus faecium (27 Mar 2022 13:54)  Organism: Enterococcus faecium (27 Mar 2022 13:54)                DVT prophylaxis: > Lovenox 40mg SQ daily  > Heparin   > SCD's
lying in bed	    Vital Signs Last 24 Hrs  T(C): 36.9 (26 Mar 2022 05:06), Max: 36.9 (25 Mar 2022 16:00)  T(F): 98.4 (26 Mar 2022 05:06), Max: 98.4 (25 Mar 2022 16:00)  HR: 70 (26 Mar 2022 05:06) (65 - 82)  BP: 106/72 (26 Mar 2022 05:06) (102/68 - 114/74)  BP(mean): --  RR: 16 (26 Mar 2022 05:06) (16 - 18)  SpO2: 99% (26 Mar 2022 05:06) (96% - 100%)    PHYSICAL EXAM:    general - AAO x 3  HEENT - No Icterus  CVS - RRR  RS - AE B/L  Abd - soft, NT  Ext - Pulses +        LABS:                        11.2   4.83  )-----------( 164      ( 25 Mar 2022 00:31 )             36.1     03-25    142  |  112<H>  |  14  ----------------------------<  85  4.5   |  25  |  0.81    Ca    9.1      25 Mar 2022 00:31    TPro  7.7  /  Alb  3.6  /  TBili  0.1<L>  /  DBili  x   /  AST  17  /  ALT  21  /  AlkPhos  49  03-25      Urinalysis Basic - ( 25 Mar 2022 06:57 )    Color: Yellow / Appearance: Clear / S.010 / pH: x  Gluc: x / Ketone: Trace  / Bili: Negative / Urobili: Negative mg/dL   Blood: x / Protein: 15 mg/dL / Nitrite: Negative   Leuk Esterase: Trace / RBC: 3-5 /HPF / WBC 3-5   Sq Epi: x / Non Sq Epi: Many / Bacteria: Moderate        Culture - Urine (collected 25 Mar 2022 10:22)  Source: Clean Catch Clean Catch (Midstream)  Preliminary Report (26 Mar 2022 08:58):    50,000 - 99,000 CFU/mL Gram positive organisms

## 2022-03-31 NOTE — BH CONSULTATION LIAISON PROGRESS NOTE - NSBHCONSULTRECOMMENDOTHER_PSY_A_CORE FT
patient is on an ODD medication regimen with 3 sub-optimal doses of antipsychotics: Seroquel 50mg po bid, haldol 5mg po bid, depakote 500mg po bid, Latuda , Topamax 25mg po qd, Latuda 40mg po qd, Abilify 30mg po qd, no longer on trazodone so it was discontinued.   3/29/22: RECOMMENDING: currently on Depacon 500mg IVP TID which can be converted to depakote 500mg PO in am & 1000mg PO qhs, continue Latuda 40mg, Abilify 30mg, discontinue haldol 5mg PO bid ; increase Seroquel to 100mg Po bid; Topamax at 25mg PO qd is a minimal dose and of little clinical utility in this case (would discontinue and simplify Pt's regimen)  - thorazine 50mg IM q6hrs PRN for agitation instead of haldol with Ativan 2mg IM q6hrs PRN  3/30/22: depakote 500mg PO in am & 1000mg PO qhs, continue Latuda 40mg, Abilify 30mg, Seroquel to 100mg Po bid + thorazine IM/Ativan IM combos  - low threshold for PRN use given hx of aggressive behavior  - FAST discharge back to residence once medically cleared 
patient is on an ODD medication regimen with 3 sub-optimal doses of antipsychotics: Seroquel 50mg po bid, haldol 5mg po bid, depakote 500mg po bid, Latuda , Topamax 25mg po qd, Latuda 40mg po qd, Abilify 30mg po qd, no longer on trazodone so it was discontinued.   3/29/22: RECOMMENDING: currently on Depacon 500mg IVP TID which can be converted to depakote 500mg PO in am & 1000mg PO qhs, continue Latuda 40mg, Abilify 30mg, discontinue haldol 5mg PO bid ; increase Seroquel to 100mg Po bid; Topamax at 25mg PO qd is a minimal dose and of little clinical utility in this case (would discontinue and simplify Pt's regimen)  - thorazine 50mg IM q6hrs PRN for agitation instead of haldol with Ativan 2mg IM q6hrs PRN  3/30/22 & 3/31/22: depakote 500mg PO in am & 1000mg PO qhs, continue Latuda 40mg, Abilify 30mg, Seroquel to 100mg Po bid + thorazine IM/Ativan IM combos  - low threshold for PRN use given hx of aggressive behavior  - FAST discharge back to residence once medically cleared   - Patient has a legal guardian; Patient CANNOT refuse transfer back to group home

## 2022-03-31 NOTE — BH CONSULTATION LIAISON PROGRESS NOTE - NSBHCHARTREVIEWVS_PSY_A_CORE FT
Vital Signs Last 24 Hrs  T(C): 36.4 (30 Mar 2022 03:30), Max: 36.6 (29 Mar 2022 23:00)  T(F): 97.6 (30 Mar 2022 03:30), Max: 97.8 (29 Mar 2022 23:00)  HR: 122 (30 Mar 2022 11:20) (57 - 144)  BP: 101/67 (30 Mar 2022 10:00) (65/51 - 120/73)  BP(mean): 73 (30 Mar 2022 10:00) (49 - 84)  RR: 23 (30 Mar 2022 11:20) (14 - 26)  SpO2: 100% (30 Mar 2022 10:00) (80% - 100%)
Vital Signs Last 24 Hrs  T(C): 36.4 (31 Mar 2022 11:42), Max: 36.8 (30 Mar 2022 20:00)  T(F): 97.6 (31 Mar 2022 11:42), Max: 98.2 (30 Mar 2022 20:00)  HR: 130 (31 Mar 2022 11:00) (97 - 147)  BP: 98/55 (31 Mar 2022 11:00) (90/48 - 124/93)  BP(mean): 63 (31 Mar 2022 11:00) (46 - 103)  RR: 14 (31 Mar 2022 11:00) (14 - 31)  SpO2: 96% (31 Mar 2022 11:00) (92% - 100%)

## 2022-03-31 NOTE — PROGRESS NOTE ADULT - REASON FOR ADMISSION
Acute DVT

## 2022-03-31 NOTE — BH CONSULTATION LIAISON PROGRESS NOTE - OTHER
deferred at this time  lower end of fair  concrete  annoyed, irritable superficially cooperative  baseline tenuous  baseline low end of fair  baseline low end of fair to limited  "I wanna get out of here!"

## 2022-03-31 NOTE — BH CONSULTATION LIAISON PROGRESS NOTE - NSBHASSESSMENTFT_PSY_ALL_CORE
Noted interval events since last seen involving aggressive, intentional behavior which is Pt's well known baseline and is NOT secondary to a primary psyhcotic or mood process but a manifestation of Patient's Axis II pathologies. No subsequent seizures. 
Noted interval events since last seen involving aggressive, intentional behavior which is Pt's well known baseline and is NOT secondary to a primary psyhcotic or mood process but a manifestation of Patient's Axis II pathologies. No subsequent seizures. Patient has a very well known and documented behavioral pattern which involved using her large frame to intimidate others, making threats of violence towards others irrespective of the setting she is in whenever she wants her needs met, if she dislikes something, wanting a certain food item or medication, avoid something she does not want to do and so on. Patient engages in learned behavior and has institutional socialization which propagates this. There is no medications that will change this or significantly alter Patient's behavioral pattern. Medications in this case are used to reduce impulsivity and stabilize affective dysregulation. If Patient makes threats towards her group home staff and they fear for their safety, calling 911 and pressing charges in this case would be suggested as Patient understands right from wrong, understands the concept of illegal behavior and resulting consequence in a Court of law. She would be found competent to stand trial. Sending Patient from ED to ED / hospital to hospital is not a remedy to this situation nor a reasonable solution. She lives in an [facility] group home which is sponsored by [facility]. Staff and case management for Patient should contact [facility] and seek assistance with this Patient.

## 2022-03-31 NOTE — BH CONSULTATION LIAISON PROGRESS NOTE - CURRENT MEDICATION
MEDICATIONS  (STANDING):  apixaban 10 milliGRAM(s) Oral every 12 hours  ARIPiprazole 30 milliGRAM(s) Oral daily  chlorhexidine 2% Cloths 1 Application(s) Topical <User Schedule>  diVALproex ER 1000 milliGRAM(s) Oral at bedtime  diVALproex  milliGRAM(s) Oral <User Schedule>  ferrous    sulfate 325 milliGRAM(s) Oral daily  influenza   Vaccine 0.5 milliLiter(s) IntraMuscular once  lacosamide IVPB 100 milliGRAM(s) IV Intermittent <User Schedule>  levothyroxine Injectable 37.5 MICROGram(s) IV Push <User Schedule>  lurasidone 40 milliGRAM(s) Oral daily  mometasone 220 MICROgram(s) Inhaler 1 Puff(s) Inhalation daily  pantoprazole  Injectable 40 milliGRAM(s) IV Push daily  QUEtiapine 100 milliGRAM(s) Oral two times a day    MEDICATIONS  (PRN):  acetaminophen     Tablet .. 650 milliGRAM(s) Oral every 6 hours PRN Temp greater or equal to 38C (100.4F), Mild Pain (1 - 3)  ALBUTerol    90 MICROgram(s) HFA Inhaler 2 Puff(s) Inhalation every 6 hours PRN Shortness of Breath and/or Wheezing  chlorproMAZINE    Injectable 50 milliGRAM(s) IntraMuscular every 6 hours PRN agitation / aggression  cyclobenzaprine 5 milliGRAM(s) Oral three times a day PRN Muscle Spasm  hydrocortisone 2.5% Cream 1 Application(s) Topical every 6 hours PRN Rash and/or Itching  LORazepam   Injectable 2 milliGRAM(s) IntraMuscular every 6 hours PRN severe anxiety/agitation  
MEDICATIONS  (STANDING):  apixaban 10 milliGRAM(s) Oral every 12 hours  ARIPiprazole 30 milliGRAM(s) Oral daily  chlorhexidine 2% Cloths 1 Application(s) Topical <User Schedule>  ferrous    sulfate 325 milliGRAM(s) Oral daily  influenza   Vaccine 0.5 milliLiter(s) IntraMuscular once  lacosamide IVPB 100 milliGRAM(s) IV Intermittent <User Schedule>  lactated ringers. 1000 milliLiter(s) (100 mL/Hr) IV Continuous <Continuous>  levothyroxine Injectable 37.5 MICROGram(s) IV Push <User Schedule>  lurasidone 40 milliGRAM(s) Oral daily  mometasone 220 MICROgram(s) Inhaler 1 Puff(s) Inhalation daily  pantoprazole  Injectable 40 milliGRAM(s) IV Push daily  QUEtiapine 100 milliGRAM(s) Oral two times a day  valproate sodium IVPB 500 milliGRAM(s) IV Intermittent three times a day    MEDICATIONS  (PRN):  acetaminophen     Tablet .. 650 milliGRAM(s) Oral every 6 hours PRN Temp greater or equal to 38C (100.4F), Mild Pain (1 - 3)  ALBUTerol    90 MICROgram(s) HFA Inhaler 2 Puff(s) Inhalation every 6 hours PRN Shortness of Breath and/or Wheezing  chlorproMAZINE    Injectable 50 milliGRAM(s) IntraMuscular every 6 hours PRN agitation / aggression  cyclobenzaprine 5 milliGRAM(s) Oral three times a day PRN Muscle Spasm  diphenhydrAMINE 25 milliGRAM(s) Oral every 6 hours PRN Rash and/or Itching  hydrocortisone 2.5% Cream 1 Application(s) Topical every 6 hours PRN Rash and/or Itching  LORazepam   Injectable 2 milliGRAM(s) IntraMuscular every 6 hours PRN severe anxiety/agitation

## 2022-04-01 ENCOUNTER — INPATIENT (INPATIENT)
Facility: HOSPITAL | Age: 37
LOS: 5 days | Discharge: HOME | End: 2022-04-07
Attending: INTERNAL MEDICINE | Admitting: INTERNAL MEDICINE
Payer: MEDICARE

## 2022-04-01 VITALS
RESPIRATION RATE: 16 BRPM | HEIGHT: 68 IN | HEART RATE: 105 BPM | DIASTOLIC BLOOD PRESSURE: 71 MMHG | OXYGEN SATURATION: 100 % | SYSTOLIC BLOOD PRESSURE: 121 MMHG | TEMPERATURE: 98 F

## 2022-04-01 DIAGNOSIS — R29.898 OTHER SYMPTOMS AND SIGNS INVOLVING THE MUSCULOSKELETAL SYSTEM: ICD-10-CM

## 2022-04-01 LAB
ALBUMIN SERPL ELPH-MCNC: 4.1 G/DL — SIGNIFICANT CHANGE UP (ref 3.3–5)
ALP SERPL-CCNC: 51 U/L — SIGNIFICANT CHANGE UP (ref 40–120)
ALT FLD-CCNC: 27 U/L — SIGNIFICANT CHANGE UP (ref 4–33)
ANION GAP SERPL CALC-SCNC: 14 MMOL/L — SIGNIFICANT CHANGE UP (ref 7–14)
APPEARANCE UR: ABNORMAL
AST SERPL-CCNC: 35 U/L — HIGH (ref 4–32)
BASOPHILS # BLD AUTO: 0.03 K/UL — SIGNIFICANT CHANGE UP (ref 0–0.2)
BASOPHILS NFR BLD AUTO: 0.4 % — SIGNIFICANT CHANGE UP (ref 0–2)
BILIRUB SERPL-MCNC: 0.3 MG/DL — SIGNIFICANT CHANGE UP (ref 0.2–1.2)
BILIRUB UR-MCNC: NEGATIVE — SIGNIFICANT CHANGE UP
BUN SERPL-MCNC: 8 MG/DL — SIGNIFICANT CHANGE UP (ref 7–23)
CALCIUM SERPL-MCNC: 9.4 MG/DL — SIGNIFICANT CHANGE UP (ref 8.4–10.5)
CHLORIDE SERPL-SCNC: 107 MMOL/L — SIGNIFICANT CHANGE UP (ref 98–107)
CO2 SERPL-SCNC: 19 MMOL/L — LOW (ref 22–31)
COLOR SPEC: YELLOW — SIGNIFICANT CHANGE UP
CREAT SERPL-MCNC: 0.84 MG/DL — SIGNIFICANT CHANGE UP (ref 0.5–1.3)
DIFF PNL FLD: NEGATIVE — SIGNIFICANT CHANGE UP
EGFR: 93 ML/MIN/1.73M2 — SIGNIFICANT CHANGE UP
EOSINOPHIL # BLD AUTO: 0.05 K/UL — SIGNIFICANT CHANGE UP (ref 0–0.5)
EOSINOPHIL NFR BLD AUTO: 0.6 % — SIGNIFICANT CHANGE UP (ref 0–6)
GLUCOSE SERPL-MCNC: 158 MG/DL — HIGH (ref 70–99)
GLUCOSE UR QL: NEGATIVE — SIGNIFICANT CHANGE UP
HCT VFR BLD CALC: 34.2 % — LOW (ref 34.5–45)
HGB BLD-MCNC: 11.4 G/DL — LOW (ref 11.5–15.5)
IANC: 5.13 K/UL — SIGNIFICANT CHANGE UP (ref 1.8–7.4)
IMM GRANULOCYTES NFR BLD AUTO: 0.4 % — SIGNIFICANT CHANGE UP (ref 0–1.5)
KETONES UR-MCNC: ABNORMAL
LEUKOCYTE ESTERASE UR-ACNC: NEGATIVE — SIGNIFICANT CHANGE UP
LYMPHOCYTES # BLD AUTO: 1.94 K/UL — SIGNIFICANT CHANGE UP (ref 1–3.3)
LYMPHOCYTES # BLD AUTO: 24 % — SIGNIFICANT CHANGE UP (ref 13–44)
MAGNESIUM SERPL-MCNC: 1.8 MG/DL — SIGNIFICANT CHANGE UP (ref 1.6–2.6)
MCHC RBC-ENTMCNC: 30.7 PG — SIGNIFICANT CHANGE UP (ref 27–34)
MCHC RBC-ENTMCNC: 33.3 GM/DL — SIGNIFICANT CHANGE UP (ref 32–36)
MCV RBC AUTO: 92.2 FL — SIGNIFICANT CHANGE UP (ref 80–100)
MONOCYTES # BLD AUTO: 0.89 K/UL — SIGNIFICANT CHANGE UP (ref 0–0.9)
MONOCYTES NFR BLD AUTO: 11 % — SIGNIFICANT CHANGE UP (ref 2–14)
NEUTROPHILS # BLD AUTO: 5.13 K/UL — SIGNIFICANT CHANGE UP (ref 1.8–7.4)
NEUTROPHILS NFR BLD AUTO: 63.6 % — SIGNIFICANT CHANGE UP (ref 43–77)
NITRITE UR-MCNC: NEGATIVE — SIGNIFICANT CHANGE UP
NRBC # BLD: 0 /100 WBCS — SIGNIFICANT CHANGE UP
NRBC # FLD: 0 K/UL — SIGNIFICANT CHANGE UP
PH UR: 6 — SIGNIFICANT CHANGE UP (ref 5–8)
PLATELET # BLD AUTO: 120 K/UL — LOW (ref 150–400)
POTASSIUM SERPL-MCNC: 5 MMOL/L — SIGNIFICANT CHANGE UP (ref 3.5–5.3)
POTASSIUM SERPL-SCNC: 5 MMOL/L — SIGNIFICANT CHANGE UP (ref 3.5–5.3)
PROT SERPL-MCNC: 7.5 G/DL — SIGNIFICANT CHANGE UP (ref 6–8.3)
PROT UR-MCNC: ABNORMAL
RBC # BLD: 3.71 M/UL — LOW (ref 3.8–5.2)
RBC # FLD: 14.9 % — HIGH (ref 10.3–14.5)
SARS-COV-2 RNA SPEC QL NAA+PROBE: SIGNIFICANT CHANGE UP
SODIUM SERPL-SCNC: 140 MMOL/L — SIGNIFICANT CHANGE UP (ref 135–145)
SP GR SPEC: 1.03 — SIGNIFICANT CHANGE UP (ref 1–1.05)
UROBILINOGEN FLD QL: ABNORMAL
WBC # BLD: 8.07 K/UL — SIGNIFICANT CHANGE UP (ref 3.8–10.5)
WBC # FLD AUTO: 8.07 K/UL — SIGNIFICANT CHANGE UP (ref 3.8–10.5)

## 2022-04-01 PROCEDURE — 70450 CT HEAD/BRAIN W/O DYE: CPT | Mod: 26,MB

## 2022-04-01 PROCEDURE — 99285 EMERGENCY DEPT VISIT HI MDM: CPT

## 2022-04-01 RX ORDER — SODIUM CHLORIDE 9 MG/ML
1000 INJECTION INTRAMUSCULAR; INTRAVENOUS; SUBCUTANEOUS ONCE
Refills: 0 | Status: COMPLETED | OUTPATIENT
Start: 2022-04-01 | End: 2022-04-01

## 2022-04-01 RX ORDER — DIPHENHYDRAMINE HCL 50 MG
50 CAPSULE ORAL ONCE
Refills: 0 | Status: COMPLETED | OUTPATIENT
Start: 2022-04-01 | End: 2022-04-01

## 2022-04-01 RX ADMIN — Medication 2 MILLIGRAM(S): at 16:29

## 2022-04-01 RX ADMIN — SODIUM CHLORIDE 1000 MILLILITER(S): 9 INJECTION INTRAMUSCULAR; INTRAVENOUS; SUBCUTANEOUS at 18:07

## 2022-04-01 RX ADMIN — Medication 50 MILLIGRAM(S): at 18:08

## 2022-04-01 NOTE — ED ADULT NURSE NOTE - OBJECTIVE STATEMENT
RN facilitator: 36 year old female with PHX: Factor 5 leiden, seizures, DVT/PE on xarelto, endometrisosis, asthma, from a group home, presented to the ED C/O B/L LE weakness/ pain. Pt denies trauma injury or falls. PT currently ambulating by self without difficulty: states she requested to be brought to hospital because its the weekend and she doesn't get along with the staff at group home over the weekend. Denies other complaints. MD evaluated, VS as noted. Medicated as per orders. Comfort measures provided. Report given to primary area RN.

## 2022-04-01 NOTE — ED PROVIDER NOTE - NS ED ROS FT
Constitutional:  See HPI  Eyes:  No visual changes  ENMT: No neck pain or stiffness  Cardiac:  says yes sometimes has chest pain; no active pain  Respiratory:  No cough or respiratory distress.   GI:  No nausea, vomiting, diarrhea or abdominal pain.   MS:  LE weakness  Neuro:  No headache ; claimed numbness in R leg and L arm to providers initially then R leg and R arm later to neurology resident  Skin:  bruising in multiple areas  Except as documented in the HPI,  all other systems are negative

## 2022-04-01 NOTE — CONSULT NOTE ADULT - SUBJECTIVE AND OBJECTIVE BOX
HPI:  Patient is a  35 y/o obese female with a PMHx of Factor 5 Leiden Deficiency, Seizures, DVT/PE on Xarelto Endometriosis, Asthma who presented to the ED and neurology consulted for complaint of LE weakness. Patient states that she felt "wobbly" since she woke up this AM. Patient was recently discharged from [facility]  for acute DVT. She was admitted to the ICU and treated for concern of seizure disorder. No seizures seen on EEG during admission and neurology evaluation unconvinced whether seizures or pseudoseizures. Patient was titrated off multiple medications and discharged home on VPA 1500 qd only. Currently patient endorses weakness of b/l LEs however on prompting unable to give details. She reports pain in her RLE. ROS otherwise negative     ROS: A 10-system ROS was performed and is negative except for those items noted above and/or in the HPI.    PAST MEDICAL & SURGICAL HISTORY:  Asthma    Seizure    Factor V Leiden    Bipolar disorder    Endometriosis    History of DVT in adulthood    No significant past surgical history      FAMILY HISTORY:  Family history of DVT (Mother)        SOCIAL HISTORY: SOCIAL HISTORY:     Marital Status: (  )   (  ) Single  (  )   (  )      Occupation:      Lives: (  ) alone  (  ) with children   (  ) with spouse  (  ) with parents  (  ) other     Illicit Drug Use: (  ) never used  (  ) other _____     Tobacco Use:  (  ) never smoked  (  ) former smoker  (  ) current smoker  (  ) pack year  (  ) last cigarette date     Alcohol Use:      Sexual History:        MEDICATIONS  Home Medications:  albuterol 90 mcg/inh inhalation aerosol: 2 puff(s) inhaled every 6 hours, As needed, Shortness of Breath and/or Wheezing (11 Mar 2022 12:19)  apixaban 5 mg oral tablet: 2 tab(s) orally every 12 hours (31 Mar 2022 10:15)  ARIPiprazole 30 mg oral tablet: 1 tab(s) orally once a day (11 Mar 2022 12:19)  diphenhydrAMINE 25 mg oral capsule: 1 cap(s) orally every 6 hours, As needed, Rash and/or Itching (27 Mar 2022 17:52)  ferrous sulfate 325 mg (65 mg elemental iron) oral tablet: 1 tab(s) orally once a day (31 Mar 2022 10:15)  lacosamide 200 mg/20 mL intravenous solution: 10 milliliter(s) intravenous every 12 hours (27 Mar 2022 17:52)  levothyroxine 75 mcg (0.075 mg) oral tablet: 1 tab(s) orally once a day (11 Mar 2022 12:19)  lurasidone 40 mg oral tablet: 1 tab(s) orally once a day (11 Mar 2022 12:19)  mometasone 220 mcg/inh inhalation aerosol powder: 1 puff(s) inhaled once a day (11 Mar 2022 13:43)  pantoprazole 40 mg intravenous injection: 40 milligram(s) intravenous once a day (31 Mar 2022 10:15)  QUEtiapine 50 mg oral tablet: 1 tab(s) orally 2 times a day (11 Mar 2022 12:19)      MEDICATIONS  (STANDING):    MEDICATIONS  (PRN):      ALLERGIES/INTOLERANCES:  Allergies  latex (Blisters)  morphine (Unknown)  penicillin (Hives)  penicillin (Other)  pineapple (Unknown)  Toradol (Rash)  Toradol (Unknown)  Zofran (Hives)  Zofran (Unknown)    Intolerances      OBJECTIVE:  VITALS   Vital Signs Last 24 Hrs  T(C): 36.7 (01 Apr 2022 16:33), Max: 36.8 (01 Apr 2022 14:23)  T(F): 98.1 (01 Apr 2022 16:33), Max: 98.2 (01 Apr 2022 14:23)  HR: 94 (01 Apr 2022 16:33) (94 - 105)  BP: 131/77 (01 Apr 2022 16:33) (121/71 - 131/77)  BP(mean): --  RR: 18 (01 Apr 2022 16:33) (16 - 18)  SpO2: 99% (01 Apr 2022 16:33) (99% - 100%)    PHYSICAL EXAM:  General: Well developed, intermittently crying, in pain   Head: atraumatic   Respiratory: non-laboured breathing, regular rate  GI: Nondistended   Integumentary: Warm and Dry   Psychiatric: Intermittently crying  Extremities: edema of b/l LEs, pain/tenderness of palpation occasionally     Neurological Exam:  Mental Status: Orientated to self, date and place.  Attention intact.  No dysarthria, or neglect. Poorly cooperative for full exam with naming  Cranial Nerves: PERRL, EOMI, +BTT b/l, no nystagmus.  CN V1-3 intact to light touch.  No facial asymmetry.  Hearing intact.  Tongue midline.  Sternocleidomastoid and Trapezius intact bilaterally.  Motor:   Tone: normal            Strength:     Upper extremity                      Delt       Bicep    Tricep                                               R         5/5 5/5        5/5       5/5                                            L          5/5 5/5        5/5       5/5  Lower extremity                       HF          KE          KF        DF         PF                                            R        5/5 5/5 5/5 5/5       5/5                                            L         5/5 5/5 5/5       5/5        5/5  Pronator drift: none                 Dysmetria: None to finger-nose-finger  Tremor: No resting, postural or action tremor.  No myoclonus.  Sensation: RUE decreased sensation to LT  Deep Tendon Reflexes: 2+ bilateral biceps, triceps, brachioradialis, unable to perform knee and ankle as patient complaing of pain.   Toes flexor bilaterally  Gait: Deferred as patient in distress        LABORATORY:  CBC   Chem       LFTs   Coagulopathy   Lipid Panel   A1c   Cardiac enzymes     U/A   CSF  Immunological  Other    STUDIES & IMAGING:  Studies (EKG, EEG, EMG, etc):       Radiology (XR, CT, MR, U/S, TTE/DONOVAN):

## 2022-04-01 NOTE — ED PROVIDER NOTE - CLINICAL SUMMARY MEDICAL DECISION MAKING FREE TEXT BOX
binh pgy1: 35yo F with extensive psych and medical hx presents with LE weakness, pt states she usually uses wheelchair but baseline unclear. Prior records reviewed, no recent CTA head. Labs, CTA head/neck, IVF. Will ask neuro to eval given complicated hx.

## 2022-04-01 NOTE — ED PROVIDER NOTE - PROGRESS NOTE DETAILS
Robert pgy1: per last VS discharge note pt was sent to [facility] located  at [address] [place] [address]. Tried to call nursing Supervisor Sahil ([phone]) - went to voice mail. JORGE L Capellan: Pt signed out to me pending CT scan, spoke with neuro CT head recommended. Called group home, RN states after discharge yesterday from [place] she went to 2 other ERs. Reports pt is frequently going to ERs. Had previously been walking independently but since DVT diagnosis has not been walking? On Eliquis for DVT. JORGE L Capellan: Spoke with Narda  states pt is ambulatory at baseline, no hx of using wheelchair or walker. Pt unable/refusing to ambulate. CT head without acute findings. Spoke with neuro, as pt unable to ambulate recommend PT evaluation. Spoke with hospitalist who accepts pt, text paged MAR. Hospitalist requesting EKG

## 2022-04-01 NOTE — ED PROVIDER NOTE - OBJECTIVE STATEMENT
37yo F with PMH of Factor V Leiden hx of DVT on AC, seizure, hx of non-compliance, bipolar, extensive psych hx coming from a  presents to ED for eval of LE weakness. Patient is a poor historian, discharged yesterday from [place] after having possible seizure with non-compliance and belligerance requiring ICU admit. Pt states that she uses wheelchair which her 'mother threw out', also says 'it's not fair that everyone else can walk'. When asked if she could walk when leaving VS, she said she could not. Non-specific changing complaints of weakness and pain on both sides. Inconsistent ROS - initially 'yes' to everything to now pain in ankles.

## 2022-04-01 NOTE — CONSULT NOTE ADULT - ASSESSMENT
Patient is a  35 y/o obese female with a PMHx of Factor 5 Leiden Deficiency, Seizures, DVT/PE on Xarelto Endometriosis, Asthma who presented to the ED and neurology consulted for complaint of LE weakness. Recently admitted to S for LE DVT. Treated for seizures however unclear if truly seizures or pseudoseizures per neuro evaluation. Discharged only on VPA. Evaluated by psych as well. Currently reports pain and weakness of LEs. RUE numbness. Neuro exam with no motor deficits, tenderness to palpation of LEs (both with edema) and RUE numbness of exam (no particular distribution). NIHSS 1    Impression   LE weakness possibly pain limited. No clear organic neurological etiology at this time. Unable to explain isolated RUE numbness at this time. Low suspicion for stroke at this time     Recommendations   - CTH   - LE Ultrasounds   - Continue home xarelto, no indication for ASA while on anticoagulation   - PT/OT evaluation  - Consider psych evaluation   - Continue home VPA 1500 qd  - Rest of care per primary team    Case to be discussed with neurology attending Dr. Corona

## 2022-04-01 NOTE — ED ADULT NURSE NOTE - INTERVENTIONS DEFINITIONS
[employer] to call system/Call bell, personal items and telephone within reach/Instruct patient to call for assistance/Non-slip footwear when patient is off stretcher
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
## 2022-04-01 NOTE — ED PROVIDER NOTE - ATTENDING CONTRIBUTION TO CARE
I (Mitchell) agree with above, I performed a history and physical. Counseled jean marie medical staff, physician assistant, and/or medical student on medical decision making as documented. Medical decisions and treatment interventions were made in real time during the patient encounter. Additionally and/or with the following exceptions: The patient presented to the ED with generalized weakness and inability to ambulate. 4/5 strength bilateral extremities, does not want to walk. Neuro consulted, ct imaging obtained and head ct was normal. complete blood count within normal limits, complete metabolic panel within normal limits, required admission for possible rehab placement. Currently no si/hi, but does have intermittent agitaiton. Anticipate admission. The patient's condition was not amenable to outpatient treatment due either the lack of feasibility of outpatient care coordination, possibility for further decompensation with adverse outcome if discharge, or treatments and diagnostic  modalities only available during an inpatient hospitalization.

## 2022-04-01 NOTE — ED ADULT TRIAGE NOTE - IDEAL BODY WEIGHT(KG)
64 Secondary Intention Text (Leave Blank If You Do Not Want): The defect will heal with secondary intention.

## 2022-04-01 NOTE — CONSULT NOTE ADULT - ATTENDING COMMENTS
The patient is c/o generalized weakness, no focal weakness.    She has been walking normally per nurse tech in the room with her.    Exam:  Able to stand from sitting and walk without any weakness.    She refused examination.    < from: CT Head No Cont (04.01.22 @ 19:21) >    IMPRESSION:  No evidence of acute transcortical infarct, acute intracranial   hemorrhage, or mass effect.    < end of copied text >      A/P  No focal neurological signs or symptoms.   She is already on Xarelto  I do not recommend any further neurological work up or treatment at this time.     Thank you  Please call us for any further questions.

## 2022-04-02 DIAGNOSIS — F79 UNSPECIFIED INTELLECTUAL DISABILITIES: ICD-10-CM

## 2022-04-02 DIAGNOSIS — F25.9 SCHIZOAFFECTIVE DISORDER, UNSPECIFIED: ICD-10-CM

## 2022-04-02 DIAGNOSIS — I82.409 ACUTE EMBOLISM AND THROMBOSIS OF UNSPECIFIED DEEP VEINS OF UNSPECIFIED LOWER EXTREMITY: ICD-10-CM

## 2022-04-02 DIAGNOSIS — N80.9 ENDOMETRIOSIS, UNSPECIFIED: ICD-10-CM

## 2022-04-02 DIAGNOSIS — J45.909 UNSPECIFIED ASTHMA, UNCOMPLICATED: ICD-10-CM

## 2022-04-02 DIAGNOSIS — R29.898 OTHER SYMPTOMS AND SIGNS INVOLVING THE MUSCULOSKELETAL SYSTEM: ICD-10-CM

## 2022-04-02 DIAGNOSIS — R56.9 UNSPECIFIED CONVULSIONS: ICD-10-CM

## 2022-04-02 DIAGNOSIS — I26.99 OTHER PULMONARY EMBOLISM WITHOUT ACUTE COR PULMONALE: ICD-10-CM

## 2022-04-02 DIAGNOSIS — Z86.2 PERSONAL HISTORY OF DISEASES OF THE BLOOD AND BLOOD-FORMING ORGANS AND CERTAIN DISORDERS INVOLVING THE IMMUNE MECHANISM: ICD-10-CM

## 2022-04-02 DIAGNOSIS — F60.3 BORDERLINE PERSONALITY DISORDER: ICD-10-CM

## 2022-04-02 DIAGNOSIS — Z29.9 ENCOUNTER FOR PROPHYLACTIC MEASURES, UNSPECIFIED: ICD-10-CM

## 2022-04-02 LAB
CULTURE RESULTS: SIGNIFICANT CHANGE UP
SPECIMEN SOURCE: SIGNIFICANT CHANGE UP

## 2022-04-02 PROCEDURE — 99221 1ST HOSP IP/OBS SF/LOW 40: CPT

## 2022-04-02 PROCEDURE — 12345: CPT | Mod: NC,GC

## 2022-04-02 PROCEDURE — 99223 1ST HOSP IP/OBS HIGH 75: CPT

## 2022-04-02 RX ORDER — ARIPIPRAZOLE 15 MG/1
30 TABLET ORAL DAILY
Refills: 0 | Status: DISCONTINUED | OUTPATIENT
Start: 2022-04-02 | End: 2022-04-07

## 2022-04-02 RX ORDER — DIVALPROEX SODIUM 500 MG/1
500 TABLET, DELAYED RELEASE ORAL DAILY
Refills: 0 | Status: DISCONTINUED | OUTPATIENT
Start: 2022-04-03 | End: 2022-04-07

## 2022-04-02 RX ORDER — FERROUS SULFATE 325(65) MG
325 TABLET ORAL DAILY
Refills: 0 | Status: DISCONTINUED | OUTPATIENT
Start: 2022-04-02 | End: 2022-04-07

## 2022-04-02 RX ORDER — ACETAMINOPHEN 500 MG
650 TABLET ORAL ONCE
Refills: 0 | Status: COMPLETED | OUTPATIENT
Start: 2022-04-02 | End: 2022-04-02

## 2022-04-02 RX ORDER — ACETAMINOPHEN 500 MG
650 TABLET ORAL EVERY 6 HOURS
Refills: 0 | Status: DISCONTINUED | OUTPATIENT
Start: 2022-04-02 | End: 2022-04-02

## 2022-04-02 RX ORDER — LEVOTHYROXINE SODIUM 125 MCG
75 TABLET ORAL DAILY
Refills: 0 | Status: DISCONTINUED | OUTPATIENT
Start: 2022-04-02 | End: 2022-04-07

## 2022-04-02 RX ORDER — ALBUTEROL 90 UG/1
2 AEROSOL, METERED ORAL EVERY 6 HOURS
Refills: 0 | Status: DISCONTINUED | OUTPATIENT
Start: 2022-04-02 | End: 2022-04-07

## 2022-04-02 RX ORDER — DIVALPROEX SODIUM 500 MG/1
1500 TABLET, DELAYED RELEASE ORAL AT BEDTIME
Refills: 0 | Status: DISCONTINUED | OUTPATIENT
Start: 2022-04-02 | End: 2022-04-02

## 2022-04-02 RX ORDER — APIXABAN 2.5 MG/1
5 TABLET, FILM COATED ORAL EVERY 12 HOURS
Refills: 0 | Status: DISCONTINUED | OUTPATIENT
Start: 2022-04-02 | End: 2022-04-07

## 2022-04-02 RX ORDER — DIPHENHYDRAMINE HCL 50 MG
25 CAPSULE ORAL EVERY 6 HOURS
Refills: 0 | Status: DISCONTINUED | OUTPATIENT
Start: 2022-04-02 | End: 2022-04-03

## 2022-04-02 RX ORDER — CHLORPROMAZINE HCL 10 MG
50 TABLET ORAL EVERY 6 HOURS
Refills: 0 | Status: DISCONTINUED | OUTPATIENT
Start: 2022-04-02 | End: 2022-04-06

## 2022-04-02 RX ORDER — DIPHENHYDRAMINE HCL 50 MG
25 CAPSULE ORAL EVERY 6 HOURS
Refills: 0 | Status: DISCONTINUED | OUTPATIENT
Start: 2022-04-02 | End: 2022-04-02

## 2022-04-02 RX ORDER — HALOPERIDOL DECANOATE 100 MG/ML
5 INJECTION INTRAMUSCULAR ONCE
Refills: 0 | Status: DISCONTINUED | OUTPATIENT
Start: 2022-04-02 | End: 2022-04-02

## 2022-04-02 RX ORDER — PANTOPRAZOLE SODIUM 20 MG/1
40 TABLET, DELAYED RELEASE ORAL
Refills: 0 | Status: DISCONTINUED | OUTPATIENT
Start: 2022-04-02 | End: 2022-04-07

## 2022-04-02 RX ORDER — LANOLIN ALCOHOL/MO/W.PET/CERES
3 CREAM (GRAM) TOPICAL AT BEDTIME
Refills: 0 | Status: DISCONTINUED | OUTPATIENT
Start: 2022-04-02 | End: 2022-04-02

## 2022-04-02 RX ORDER — CHLORPROMAZINE HCL 10 MG
50 TABLET ORAL EVERY 6 HOURS
Refills: 0 | Status: DISCONTINUED | OUTPATIENT
Start: 2022-04-02 | End: 2022-04-03

## 2022-04-02 RX ORDER — DIVALPROEX SODIUM 500 MG/1
500 TABLET, DELAYED RELEASE ORAL ONCE
Refills: 0 | Status: COMPLETED | OUTPATIENT
Start: 2022-04-02 | End: 2022-04-02

## 2022-04-02 RX ORDER — CHLORPROMAZINE HCL 10 MG
50 TABLET ORAL EVERY 6 HOURS
Refills: 0 | Status: DISCONTINUED | OUTPATIENT
Start: 2022-04-02 | End: 2022-04-02

## 2022-04-02 RX ORDER — QUETIAPINE FUMARATE 200 MG/1
100 TABLET, FILM COATED ORAL
Refills: 0 | Status: DISCONTINUED | OUTPATIENT
Start: 2022-04-02 | End: 2022-04-03

## 2022-04-02 RX ORDER — HALOPERIDOL DECANOATE 100 MG/ML
5 INJECTION INTRAMUSCULAR EVERY 6 HOURS
Refills: 0 | Status: DISCONTINUED | OUTPATIENT
Start: 2022-04-02 | End: 2022-04-02

## 2022-04-02 RX ORDER — DIPHENHYDRAMINE HCL 50 MG
25 CAPSULE ORAL ONCE
Refills: 0 | Status: COMPLETED | OUTPATIENT
Start: 2022-04-02 | End: 2022-04-02

## 2022-04-02 RX ORDER — QUETIAPINE FUMARATE 200 MG/1
50 TABLET, FILM COATED ORAL
Refills: 0 | Status: DISCONTINUED | OUTPATIENT
Start: 2022-04-02 | End: 2022-04-02

## 2022-04-02 RX ORDER — HALOPERIDOL DECANOATE 100 MG/ML
5 INJECTION INTRAMUSCULAR ONCE
Refills: 0 | Status: COMPLETED | OUTPATIENT
Start: 2022-04-02 | End: 2022-04-02

## 2022-04-02 RX ORDER — MOMETASONE FUROATE 220 UG/1
1 INHALANT RESPIRATORY (INHALATION) DAILY
Refills: 0 | Status: DISCONTINUED | OUTPATIENT
Start: 2022-04-02 | End: 2022-04-07

## 2022-04-02 RX ORDER — LURASIDONE HYDROCHLORIDE 40 MG/1
40 TABLET ORAL DAILY
Refills: 0 | Status: DISCONTINUED | OUTPATIENT
Start: 2022-04-02 | End: 2022-04-07

## 2022-04-02 RX ORDER — CYCLOBENZAPRINE HYDROCHLORIDE 10 MG/1
5 TABLET, FILM COATED ORAL THREE TIMES A DAY
Refills: 0 | Status: DISCONTINUED | OUTPATIENT
Start: 2022-04-02 | End: 2022-04-06

## 2022-04-02 RX ORDER — DIVALPROEX SODIUM 500 MG/1
1000 TABLET, DELAYED RELEASE ORAL AT BEDTIME
Refills: 0 | Status: DISCONTINUED | OUTPATIENT
Start: 2022-04-02 | End: 2022-04-07

## 2022-04-02 RX ADMIN — CYCLOBENZAPRINE HYDROCHLORIDE 5 MILLIGRAM(S): 10 TABLET, FILM COATED ORAL at 13:02

## 2022-04-02 RX ADMIN — HALOPERIDOL DECANOATE 5 MILLIGRAM(S): 100 INJECTION INTRAMUSCULAR at 12:25

## 2022-04-02 RX ADMIN — DIVALPROEX SODIUM 500 MILLIGRAM(S): 500 TABLET, DELAYED RELEASE ORAL at 13:02

## 2022-04-02 RX ADMIN — Medication 325 MILLIGRAM(S): at 12:58

## 2022-04-02 RX ADMIN — Medication 25 MILLIGRAM(S): at 22:51

## 2022-04-02 RX ADMIN — Medication 1 MILLIGRAM(S): at 09:36

## 2022-04-02 RX ADMIN — Medication 2 MILLIGRAM(S): at 12:24

## 2022-04-02 RX ADMIN — DIVALPROEX SODIUM 1000 MILLIGRAM(S): 500 TABLET, DELAYED RELEASE ORAL at 22:50

## 2022-04-02 RX ADMIN — Medication 25 MILLIGRAM(S): at 17:50

## 2022-04-02 RX ADMIN — Medication 50 MILLIGRAM(S): at 20:57

## 2022-04-02 RX ADMIN — Medication 2 MILLIGRAM(S): at 19:25

## 2022-04-02 RX ADMIN — QUETIAPINE FUMARATE 100 MILLIGRAM(S): 200 TABLET, FILM COATED ORAL at 19:25

## 2022-04-02 RX ADMIN — Medication 75 MICROGRAM(S): at 06:48

## 2022-04-02 RX ADMIN — APIXABAN 5 MILLIGRAM(S): 2.5 TABLET, FILM COATED ORAL at 17:51

## 2022-04-02 RX ADMIN — APIXABAN 5 MILLIGRAM(S): 2.5 TABLET, FILM COATED ORAL at 06:49

## 2022-04-02 RX ADMIN — QUETIAPINE FUMARATE 50 MILLIGRAM(S): 200 TABLET, FILM COATED ORAL at 06:48

## 2022-04-02 RX ADMIN — PANTOPRAZOLE SODIUM 40 MILLIGRAM(S): 20 TABLET, DELAYED RELEASE ORAL at 06:51

## 2022-04-02 RX ADMIN — Medication 2 MILLIGRAM(S): at 20:32

## 2022-04-02 NOTE — BH CONSULTATION LIAISON ASSESSMENT NOTE - RISK ASSESSMENT
Risk Factors: acute medical condition, chronic medical comorbidities, hx aggression, impulsivity, multiple inpatient psy hospitalizations  Protective Factors: residential stability, supportive family/friends, positive therapeutic relationships, denies suicidal ideation  Risk Factors: acute medical condition, chronic medical comorbidities, hx aggression, impulsivity, multiple inpatient psy hospitalizations, cluster B traits  Protective Factors: residential stability, supportive family/friends, positive therapeutic relationships, denies suicidal ideation

## 2022-04-02 NOTE — BH CONSULTATION LIAISON ASSESSMENT NOTE - NSBHCHARTREVIEWLAB_PSY_A_CORE FT
CBC Full  -  ( 01 Apr 2022 18:12 )  WBC Count : 8.07 K/uL  RBC Count : 3.71 M/uL  Hemoglobin : 11.4 g/dL  Hematocrit : 34.2 %  Platelet Count - Automated : 120 K/uL  Mean Cell Volume : 92.2 fL  Mean Cell Hemoglobin : 30.7 pg  Mean Cell Hemoglobin Concentration : 33.3 gm/dL  Auto Neutrophil # : 5.13 K/uL  Auto Lymphocyte # : 1.94 K/uL  Auto Monocyte # : 0.89 K/uL  Auto Eosinophil # : 0.05 K/uL  Auto Basophil # : 0.03 K/uL  Auto Neutrophil % : 63.6 %  Auto Lymphocyte % : 24.0 %  Auto Monocyte % : 11.0 %  Auto Eosinophil % : 0.6 %  Auto Basophil % : 0.4 %  04-01    140  |  107  |  8   ----------------------------<  158<H>  5.0   |  19<L>  |  0.84    Ca    9.4      01 Apr 2022 18:12  Mg     1.80     04-01    TPro  7.5  /  Alb  4.1  /  TBili  0.3  /  DBili  x   /  AST  35<H>  /  ALT  27  /  AlkPhos  51  04-01

## 2022-04-02 NOTE — H&P ADULT - NS ATTEND AMEND GEN_ALL_CORE FT
35 yo woman with history of Factor V Leiden deficiency, DVT/PE (previously on Xarelto, now on Eliquis), seizure d/o, asthma, endometriosis, intellectual disability, extensive psychiatric d/o (schizoaffective vs. bipolar, personality d/o), and recent admission at [facility] ICU for possible status epilepticus vs. pseudoseizures and severe agitation requiring Precedex gtt presents with subjective b/l LE weakness day after discharge from [facility]. Pt, however, with no noted motor weakness in any of her extremities on exam. CTH negative. Pt observed by staff at [facility] to be kicking and standing on her legs without any issues, though she reported b/l LE weakness at that time as well. Will obtain b/l LE venous dopplers to r/o DVT. Psychiatry consult pending.

## 2022-04-02 NOTE — BH CONSULTATION LIAISON ASSESSMENT NOTE - NSBHMSETHTPROC_PSY_A_CORE
Tangential/Perseverative/Flight of ideas/Illogical/Other Disorganized/Tangential/Perseverative/Flight of ideas/Illogical/Other

## 2022-04-02 NOTE — BH CONSULTATION LIAISON ASSESSMENT NOTE - NSBHCHARTREVIEWVS_PSY_A_CORE FT
Vital Signs Last 24 Hrs  T(C): 36.7 (02 Apr 2022 13:57), Max: 37.1 (01 Apr 2022 23:57)  T(F): 98 (02 Apr 2022 13:57), Max: 98.7 (01 Apr 2022 23:57)  HR: 95 (02 Apr 2022 13:57) (80 - 95)  BP: 107/76 (02 Apr 2022 13:57) (101/65 - 131/77)  BP(mean): --  RR: 18 (02 Apr 2022 13:57) (18 - 18)  SpO2: 100% (02 Apr 2022 13:57) (99% - 100%)

## 2022-04-02 NOTE — H&P ADULT - NSHPREVIEWOFSYSTEMS_GEN_ALL_CORE
Unable to obtain Review of Systems, as pt is currently sedated and would awake for a few seconds, mutter a few words, then fall back asleep without comprehending any questions directed to her.

## 2022-04-02 NOTE — H&P ADULT - RS GEN PE MLT RESP DETAILS PC
good air movement/respirations non-labored/no intercostal retractions good air movement/respirations non-labored/clear to auscultation bilaterally/no intercostal retractions/no rales/no rhonchi/no wheezes

## 2022-04-02 NOTE — H&P ADULT - NSHPSOCIALHISTORY_GEN_ALL_CORE
Unable to obtain Unable to obtain Social History, as pt is currently sedated and would awake for a few seconds, mutter a few words, then fall back asleep without comprehending any questions directed to her.  Per chart review, pt with history of cocaine/crack use.  Lives in a group home.

## 2022-04-02 NOTE — H&P ADULT - PROBLEM SELECTOR PLAN 8
VTE covered with Eliquis.  Fall, Aspiration, safety and seizure precautions.   PT, OT, neuro eval VTE ppx covered with Eliquis.  Fall, Aspiration, safety and seizure precautions.   PT, OT, neuro eval

## 2022-04-02 NOTE — H&P ADULT - PROBLEM SELECTOR PLAN 3
Seizure precautions.  Continue VPA 1500 mg po QHS.  F/U VPA level.   F/U TSH, A1C Pt with h/o seizure d/o, was in ICU at [facility] for possible status epilepticus vs. pseudoseizures.   - C/w Depakote 1500 mg qHS

## 2022-04-02 NOTE — PATIENT PROFILE ADULT - NSPRESCRALCFREQ_GEN_A_NUR
ED / Discharge Outreach Protocol    Admitted from 11/27 - 11/29 COVID, gastroenteritis, mild pneumonia   Discharged with tylenol, eliquis, zofran, CHANGE in Coreg dose   Advised to follow-up with pcp within one week with BMP (not yet scheduled)   Advised to f/u with nephrology in 2 weeks with BMP     Patient Contact    Attempt # 1    Was call answered?  No.  Left message on voicemail with information to call GALLITO TOUSSAINT back     Sultana Urbina Registered Nurse, PAL (Patient Advocate Liason)   [facility]   [phone]      Never

## 2022-04-02 NOTE — BH CONSULTATION LIAISON ASSESSMENT NOTE - CASE SUMMARY
Chart reviewed, patient seen/evaluated virtually with Sharyn Perrin NP via tele-video platform,  I agree with above assessment/plan. Patient alert,  angry, impulsive, irritable, making provocative threatening statements, overall uncooperative with interview. Interview rather limited at this time.  Plan as above. Psych C/L will follow up tomorrow.

## 2022-04-02 NOTE — CHART NOTE - NSCHARTNOTEFT_GEN_A_CORE
Called to bedside to assess agitation after patient attempted to flee.     A+P:    Patient was very upset, agitated, screaming uncontrollably, not re-directablem making threats against staff members and expresses suicidal ideation.    Patient was placed on restraints due to danger towards self and staff. 2mg ativan was ordered and scanned, however because patient recently received 2mg at 17:40, only 1mg ativan was pushed at this time. Additionally, per psych recommendations 50mg Thorazine was placed as a prn for agitation.     Mami Cartagena, PGY-1  Internal Medicine

## 2022-04-02 NOTE — H&P ADULT - HISTORY OF PRESENT ILLNESS
Pt currently sedated, arousable to tactile stim.  But falls back asleep after a second. History obtained from the chart. Current medication obtained from [facility] discharge note.     36F, obese, history of  Factor 5 Leiden Deficiency, Seizures, DVT/PE on Xarelto,  ndometriosis, Asthma complaint of B/L LE weakness. Patient stated she felt "wobbly" since she woke up 4/1 AM. Patient was recently discharged from [facility] for acute DVT. There the pt was admitted to the ICU and treated for concern of seizure disorder, given multiple doses of ativan and loaded with Vimpat and Valproic acid, pt did not have any further seizure activity.  No seizures seen on EEG during admission and neurology evaluation unconvinced whether seizures or pseudoseizures. Patient was titrated off multiple medications and discharged home on VPA 1500 qd only. Current the pt offers no complaints when asked.     In the Ed. The pt was seen by neurology for the b/l LE weakness. Their consult and recommendations are in the chart.     Vitals: Temp = 97.8*, HR = 88b/ min, BP = 104/ 75, RR= 18b/ min, SPO2= 100% ra  37 yo woman with history of Factor V Leiden deficiency, DVT/PE (previously on Xarelto, now on Eliquis), seizure d/o, asthma, endometriosis, intellectual disability, and extensive psychiatric d/o (schizoaffective vs. bipolar, personality d/o) presents with subjective b/l LE weakness.   Pt currently sedated, arousable to tactile stim.  But falls back asleep after a second. History obtained from the chart. Current medication obtained from [facility] discharge note.     36F, obese, history of  Factor 5 Leiden Deficiency, Seizures, DVT/PE on Xarelto,  ndometriosis, Asthma complaint of B/L LE weakness. Patient stated she felt "wobbly" since she woke up 4/1 AM. Patient was recently discharged from [facility] for acute DVT. There the pt was admitted to the ICU and treated for concern of seizure disorder, given multiple doses of ativan and loaded with Vimpat and Valproic acid, pt did not have any further seizure activity.  No seizures seen on EEG during admission and neurology evaluation unconvinced whether seizures or pseudoseizures. Patient was titrated off multiple medications and discharged home on VPA 1500 qd only. Current the pt offers no complaints when asked.     In the Ed. The pt was seen by neurology for the b/l LE weakness. Their consult and recommendations are in the chart.     Vitals: Temp = 97.8*, HR = 88b/ min, BP = 104/ 75, RR= 18b/ min, SPO2= 100% ra  35 yo woman with history of Factor V Leiden deficiency, DVT/PE (previously on Xarelto, now on Eliquis), seizure d/o, asthma, endometriosis, intellectual disability, and extensive psychiatric d/o (schizoaffective vs. bipolar, personality d/o) presents with subjective b/l LE weakness. Pt had been admitted at [facility] from 3/25-3/31/22 and required a stay in the ICU for possible status epilepticus and severe agitation requiring Precedex gtt. There was plan to transfer pt to EMU at [facility], but transfer was canceled after pt had multiple [employer] called on her   Pt currently sedated, arousable to tactile stim.  But falls back asleep after a second. History obtained from the chart. Current medication obtained from [facility] discharge note.     36F, obese, history of  Factor 5 Leiden Deficiency, Seizures, DVT/PE on Xarelto,  ndometriosis, Asthma complaint of B/L LE weakness. Patient stated she felt "wobbly" since she woke up 4/1 AM. Patient was recently discharged from [facility] for acute DVT. There the pt was admitted to the ICU and treated for concern of seizure disorder, given multiple doses of ativan and loaded with Vimpat and Valproic acid, pt did not have any further seizure activity.  No seizures seen on EEG during admission and neurology evaluation unconvinced whether seizures or pseudoseizures. Patient was titrated off multiple medications and discharged home on VPA 1500 qd only. Current the pt offers no complaints when asked.     In the Ed. The pt was seen by neurology for the b/l LE weakness. Their consult and recommendations are in the chart.     Vitals: Temp = 97.8*, HR = 88b/ min, BP = 104/ 75, RR= 18b/ min, SPO2= 100% ra  35 yo woman with history of Factor V Leiden deficiency, DVT/PE (previously on Xarelto, now on Eliquis), seizure d/o, asthma, endometriosis, intellectual disability, and extensive psychiatric d/o (schizoaffective vs. bipolar, personality d/o) presents with subjective b/l LE weakness. Pt had been admitted at [facility] from 3/25-3/31/22 and required a stay in the ICU for possible status epilepticus and severe agitation requiring Precedex gtt. There was plan to transfer pt to EMU at [facility], but transfer was canceled after pt had multiple [employer] called on her due to severe agitation, aggression, and outbursts. Pt was instead discharged back to her group home on Thursday after being deemed clinically stable. Per documentation from Critical Care attending at [facility] on 3/31/22:  "She initially refused to be discharged this morning stating she 'can't walk and her legs are too weak' however throughout her hospital course she has been able to kick or attempt to kick her legs without difficulty and swing them off the bed and she was witnessed to stand and take steps to go to the commode by nursing staff and able to stand up from bed during her periods of agitation."    During her hospitalization, pt was diagnosed with a L popliteal vein DVT while on Xarelto and was, therefore, switched to Eliquis. Pt states that she has been feeling "wobbly" since she woke up on Friday morning.              36F, obese, history of  Factor 5 Leiden Deficiency, Seizures, DVT/PE on Xarelto,  ndometriosis, Asthma complaint of B/L LE weakness. Patient stated she felt "wobbly" since she woke up 4/1 AM. Patient was recently discharged from [facility] for acute DVT. There the pt was admitted to the ICU and treated for concern of seizure disorder, given multiple doses of ativan and loaded with Vimpat and Valproic acid, pt did not have any further seizure activity.  No seizures seen on EEG during admission and neurology evaluation unconvinced whether seizures or pseudoseizures. Patient was titrated off multiple medications and discharged home on VPA 1500 qd only. Current the pt offers no complaints when asked.     In the Ed. The pt was seen by neurology for the b/l LE weakness. Their consult and recommendations are in the chart.     Vitals: Temp = 97.8*, HR = 88b/ min, BP = 104/ 75, RR= 18b/ min, SPO2= 100% ra  37 yo woman with history of Factor V Leiden deficiency, DVT/PE (previously on Xarelto, now on Eliquis), seizure d/o, asthma, endometriosis, intellectual disability, and extensive psychiatric d/o (schizoaffective vs. bipolar, personality d/o) presents with subjective b/l LE weakness. Pt had been admitted at [facility] from 3/25-3/31/22 and required a stay in the ICU for possible status epilepticus and severe agitation requiring Precedex gtt. There was plan to transfer pt to EMU at [facility], but transfer was canceled after pt had multiple [employer] called on her due to severe agitation, aggression, and outbursts. Pt was instead discharged back to her group home on Thursday after being deemed clinically stable. Per documentation from Critical Care attending at [facility] on 3/31/22:  "She initially refused to be discharged this morning stating she 'can't walk and her legs are too weak' however throughout her hospital course she has been able to kick or attempt to kick her legs without difficulty and swing them off the bed and she was witnessed to stand and take steps to go to the commode by nursing staff and able to stand up from bed during her periods of agitation."    During her hospitalization, pt was diagnosed with a L popliteal vein DVT while on Xarelto and was, therefore, switched to Eliquis. Pt states that she has been feeling "wobbly" since she woke up on Friday morning. Otherwise, unable to elicit more history from pt, as pt is a poor historian and currently sedated.     In the ED, pt was seen by neurology for the b/l LE weakness. Their consult and recommendations are in the chart.     Vitals: Temp = 97.8*, HR = 88b/ min, BP = 104/ 75, RR= 18b/ min, SPO2= 100% ra  35 yo woman with history of Factor V Leiden deficiency, DVT/PE (previously on Xarelto, now on Eliquis), seizure d/o, asthma, endometriosis, intellectual disability, and extensive psychiatric d/o (schizoaffective vs. bipolar, personality d/o) presents with subjective b/l LE weakness. Pt had been admitted at [facility] from 3/25-3/31/22 and required a stay in the ICU for possible status epilepticus vs. pseudoseizures and severe agitation requiring Precedex gtt. There was plan to transfer pt to EMU at [facility], but transfer was canceled after pt had multiple [employer] called on her due to severe agitation, aggression, and outbursts. Pt was instead discharged back to her group home on Thursday after being deemed clinically stable. Per documentation from Critical Care attending at [facility] on 3/31/22:  "She initially refused to be discharged this morning stating she 'can't walk and her legs are too weak' however throughout her hospital course she has been able to kick or attempt to kick her legs without difficulty and swing them off the bed and she was witnessed to stand and take steps to go to the commode by nursing staff and able to stand up from bed during her periods of agitation."    During her hospitalization, pt was diagnosed with a L popliteal vein DVT while on Xarelto and was, therefore, switched to Eliquis. Pt states that she has been feeling "wobbly" since she woke up on Friday morning. Otherwise, unable to elicit more history from pt, as pt is a poor historian and currently sedated.     In the ED, pt was seen by neurology for the b/l LE weakness. Their consult and recommendations are in the chart.     Vitals: Temp = 97.8*, HR = 88b/ min, BP = 104/ 75, RR= 18b/ min, SPO2= 100% ra

## 2022-04-02 NOTE — H&P ADULT - NSHPLABSRESULTS_GEN_ALL_CORE
3/25/22;  B/L LE US:  Deep venous thrombosis in the left popliteal vein, at the knee.    4/1/22  UCG Negative  CT HEAD  No evidence of acute transcortical infarct, acute intracranial hemorrhage, or mass effect.    EKG NSR @ 88b/ min, QT QTC= 398/ 481    UA clean   COVID Negative                          11.4   8.07  )-----------( 120      ( 01 Apr 2022 18:12 )             34.2   04-01    140  |  107  |  8   ----------------------------<  158<H>  5.0   |  19<L>  |  0.84    Ca    9.4      01 Apr 2022 18:12  Mg     1.80     04-01    TPro  7.5  /  Alb  4.1  /  TBili  0.3  /  DBili  x   /  AST  35<H>  /  ALT  27  /  AlkPhos  51  04-01 Imaging personally reviewed.  3/25/22  B/L LE US:  Deep venous thrombosis in the left popliteal vein, at the knee.    4/1/22  CT HEAD: No evidence of acute transcortical infarct, acute intracranial hemorrhage, or mass effect.    EKG personally reviewed.   NSR @ 88b/ min, QT QTC= 398/ 481 ms    Labs personally reviewed.   UA clean   COVID Negative                        11.4   8.07  )-----------( 120      ( 01 Apr 2022 18:12 )             34.2   04-01    140  |  107  |  8   ----------------------------<  158<H>  5.0   |  19<L>  |  0.84    Ca    9.4      01 Apr 2022 18:12  Mg     1.80     04-01    TPro  7.5  /  Alb  4.1  /  TBili  0.3  /  DBili  x   /  AST  35<H>  /  ALT  27  /  AlkPhos  51  04-01

## 2022-04-02 NOTE — BH CONSULTATION LIAISON ASSESSMENT NOTE - HPI (INCLUDE ILLNESS QUALITY, SEVERITY, DURATION, TIMING, CONTEXT, MODIFYING FACTORS, ASSOCIATED SIGNS AND SYMPTOMS)
37yo single, disabled, female domiciled in [facility], PMHx Factor V Leiden deficiency, DVT/PE, seizure d/o, asthma, endometriosis, intellectual disability, vitamine D deficiency, chronic ischemic heart disease, IBS, GERD, PID, headache syndrome, hypothyroidism, PPHx extensive psychiatric (schizoaffective vs. bipolar, personality d/o, mood d/o), outpatient care at the [facility], multiple inpatient psychiatric admissions most recent [place] 7/20-8/21, hx crack/cocaine use dos, prior legal issues (weapons possession, harassment charges), two prior SA (overdose 2017), hx SIB, presents to [facility] ED with subjective b/l LE weakness.    Chart reviewed. Patient seen accompanied by PCA, patient angry, irritable and making threats to "blow up the building," patient with poor attention span requiring frequent repeating and redirection, she has very concrete thinking with poor insight. Patient denies suicidal ideation, denies auditory hallucinations, endorses visual hallucination-seeing "grey shadows."  37yo single, disabled, female domiciled in [facility], PMHx Factor V Leiden deficiency, DVT/PE, seizure d/o, asthma, endometriosis, intellectual disability, vitamine D deficiency, chronic ischemic heart disease, IBS, GERD, PID, headache syndrome, hypothyroidism, as per chart: PPHx extensive psychiatric (schizoaffective vs. bipolar, personality d/o, mood d/o), outpatient care at the [facility], multiple inpatient psychiatric admissions most recent [place] 7/20-8/21, hx crack/cocaine use dos, prior legal issues (weapons possession, harassment charges), two prior SA (overdose 2017), hx SIB, presents to [facility] ED with subjective b/l LE weakness.    Chart reviewed. Patient seen accompanied by PCA, patient angry, irritable and making provocative statements/threats ,  patient with poor attention span requiring frequent repeating and redirection, she has very concrete thinking with poor insight. Patient denies suicidal ideation, denies auditory hallucinations, endorses visual hallucination-seeing "grey shadows."

## 2022-04-02 NOTE — BH CONSULTATION LIAISON ASSESSMENT NOTE - CURRENT MEDICATION
MEDICATIONS  (STANDING):  apixaban 5 milliGRAM(s) Oral every 12 hours  ARIPiprazole 30 milliGRAM(s) Oral daily  diVALproex DR 1000 milliGRAM(s) Oral at bedtime  ferrous    sulfate 325 milliGRAM(s) Oral daily  levothyroxine 75 MICROGram(s) Oral daily  lurasidone 40 milliGRAM(s) Oral daily  mometasone 220 MICROgram(s) Inhaler 1 Puff(s) Inhalation daily  pantoprazole    Tablet 40 milliGRAM(s) Oral before breakfast  QUEtiapine 100 milliGRAM(s) Oral two times a day    MEDICATIONS  (PRN):  acetaminophen     Tablet .. 650 milliGRAM(s) Oral every 6 hours PRN Temp greater or equal to 38C (100.4F), Mild Pain (1 - 3)  ALBUTerol    90 MICROgram(s) HFA Inhaler 2 Puff(s) Inhalation every 6 hours PRN Shortness of Breath and/or Wheezing  aluminum hydroxide/magnesium hydroxide/simethicone Suspension 30 milliLiter(s) Oral every 4 hours PRN Dyspepsia  cyclobenzaprine 5 milliGRAM(s) Oral three times a day PRN Muscle Spasm  diphenhydrAMINE 25 milliGRAM(s) Oral every 6 hours PRN Rash and/or Itching  LORazepam   Injectable 1 milliGRAM(s) IntraMuscular every 6 hours PRN Sever agitation  melatonin 3 milliGRAM(s) Oral at bedtime PRN Insomnia

## 2022-04-02 NOTE — PROGRESS NOTE ADULT - SUBJECTIVE AND OBJECTIVE BOX
Patient is a 36y old  Female who presents with a chief complaint of B/l LE weakness (02 Apr 2022 07:09)     INTERVAL HPI/OVERNIGHT EVENTS:  - No acute events overnight    SUBJECTIVE  - Patient seen and evaluated at bedside  - Patient reports presence of   - Patient reports absence of fevers, chills, HA, lightheadedness, dizziness, nausea, emesis, chest pain, dyspnea, palpitations, abd pain, diarrhea, urinary symptoms, skin color changes or rashes, or LE edema     MEDICATIONS  (STANDING):  apixaban 5 milliGRAM(s) Oral every 12 hours  ARIPiprazole 30 milliGRAM(s) Oral daily  diVALproex  milliGRAM(s) Oral once  diVALproex DR 1000 milliGRAM(s) Oral at bedtime  ferrous    sulfate 325 milliGRAM(s) Oral daily  levothyroxine 75 MICROGram(s) Oral daily  lurasidone 40 milliGRAM(s) Oral daily  mometasone 220 MICROgram(s) Inhaler 1 Puff(s) Inhalation daily  pantoprazole    Tablet 40 milliGRAM(s) Oral before breakfast  QUEtiapine 100 milliGRAM(s) Oral two times a day    MEDICATIONS  (PRN):  acetaminophen     Tablet .. 650 milliGRAM(s) Oral every 6 hours PRN Temp greater or equal to 38C (100.4F), Mild Pain (1 - 3)  ALBUTerol    90 MICROgram(s) HFA Inhaler 2 Puff(s) Inhalation every 6 hours PRN Shortness of Breath and/or Wheezing  aluminum hydroxide/magnesium hydroxide/simethicone Suspension 30 milliLiter(s) Oral every 4 hours PRN Dyspepsia  cyclobenzaprine 5 milliGRAM(s) Oral three times a day PRN Muscle Spasm  diphenhydrAMINE 25 milliGRAM(s) Oral every 6 hours PRN Rash and/or Itching  LORazepam   Injectable 1 milliGRAM(s) IntraMuscular every 6 hours PRN Sever agitation  melatonin 3 milliGRAM(s) Oral at bedtime PRN Insomnia    Allergies:  latex (Blisters)  morphine (Unknown)  penicillin (Hives)  penicillin (Other)  pineapple (Unknown)  Toradol (Rash)  Toradol (Unknown)  Zofran (Hives)  Zofran (Unknown)    Intolerances:      REVIEW OF SYSTEMS: As indicated above; otherwise, negative    VITAL SIGNS:  T(F): 98.7 (04-02-22 @ 06:45), Max: 98.7 (04-01-22 @ 23:57)  HR: 80 (04-02-22 @ 06:45) (80 - 105)  BP: 112/78 (04-02-22 @ 06:45) (101/65 - 131/77)  RR: 18 (04-02-22 @ 06:45) (16 - 18)  SpO2: 100% (04-02-22 @ 06:45) (99% - 100%)    PHYSICAL EXAM:  General: NAD, well-groomed, well-developed  Eyes: Conjunctiva and sclera clear  ENMT: Moist mucous membranes  Neck: No palpable pre-auricular, post-auricular, occipital, mandibular, submental, supra-clavicular, or infra-clavicular lymph nodes   Chest: Clear to auscultation bilaterally; no rales, rhonchi, or wheezing  Heart: Regular rate and rhythm; normal S1 and S2; no murmurs, rubs, or gallops  Abd: Soft, nontender, nondistended  Nervous System: AAOX3  Psych: Appropriate affect  Ext: no peripheral LE edema bilaterally    LABS:                        11.4   8.07  )-----------( 120      ( 01 Apr 2022 18:12 )             34.2     01 Apr 2022 18:12    140    |  107    |  8      ----------------------------<  158    5.0     |  19     |  0.84     Ca    9.4        01 Apr 2022 18:12  Mg     1.80      01 Apr 2022 18:12    TPro  7.5    /  Alb  4.1    /  TBili  0.3    /  DBili  x      /  AST  35     /  ALT  27     /  AlkPhos  51     01 Apr 2022 18:12    CAPILLARY BLOOD GLUCOSE        BLOOD CULTURE    RADIOLOGY & ADDITIONAL TESTS:    Imaging Personally Reviewed:  [X ] YES     Consultant(s) Notes Reviewed:  Yes    Care Discussed with Consultants/Other Providers: Yes Patient is a 36y old  Female who presents with a chief complaint of B/l LE weakness (02 Apr 2022 07:09)     INTERVAL HPI/OVERNIGHT EVENTS:  - No acute events overnight    SUBJECTIVE  - Patient seen and evaluated at bedside  - Patient reports presence of lower extremity weakness, LE pain bilaterally RUE pain, and headache; she is teary because she says nurse was mean to her; patient says she wants to leave to go home and does not want to be in this hospital; she also says she wants to go to [facility]  - Patient reports absence of fevers, chest pain, abd pain, diarrhea, urinary symptoms, skin color changes or rashes, or LE edema  - She reports having an instance of "pitch black stools" and cannot remember how long ago  - Of note, she reports having on and off pressure-like chest pain but is not currently having chest pain  - Has muscle spasms between shoulder blades    MEDICATIONS  (STANDING):  apixaban 5 milliGRAM(s) Oral every 12 hours  ARIPiprazole 30 milliGRAM(s) Oral daily  diVALproex  milliGRAM(s) Oral once  diVALproex DR 1000 milliGRAM(s) Oral at bedtime  ferrous    sulfate 325 milliGRAM(s) Oral daily  levothyroxine 75 MICROGram(s) Oral daily  lurasidone 40 milliGRAM(s) Oral daily  mometasone 220 MICROgram(s) Inhaler 1 Puff(s) Inhalation daily  pantoprazole    Tablet 40 milliGRAM(s) Oral before breakfast  QUEtiapine 100 milliGRAM(s) Oral two times a day    MEDICATIONS  (PRN):  acetaminophen     Tablet .. 650 milliGRAM(s) Oral every 6 hours PRN Temp greater or equal to 38C (100.4F), Mild Pain (1 - 3)  ALBUTerol    90 MICROgram(s) HFA Inhaler 2 Puff(s) Inhalation every 6 hours PRN Shortness of Breath and/or Wheezing  aluminum hydroxide/magnesium hydroxide/simethicone Suspension 30 milliLiter(s) Oral every 4 hours PRN Dyspepsia  cyclobenzaprine 5 milliGRAM(s) Oral three times a day PRN Muscle Spasm  diphenhydrAMINE 25 milliGRAM(s) Oral every 6 hours PRN Rash and/or Itching  LORazepam   Injectable 1 milliGRAM(s) IntraMuscular every 6 hours PRN Sever agitation  melatonin 3 milliGRAM(s) Oral at bedtime PRN Insomnia    Allergies:  latex (Blisters)  morphine (Unknown)  penicillin (Hives)  penicillin (Other)  pineapple (Unknown)  Toradol (Rash)  Toradol (Unknown)  Zofran (Hives)  Zofran (Unknown)    Intolerances:      REVIEW OF SYSTEMS: As indicated above; otherwise, negative    VITAL SIGNS:  T(F): 98.7 (04-02-22 @ 06:45), Max: 98.7 (04-01-22 @ 23:57)  HR: 80 (04-02-22 @ 06:45) (80 - 105)  BP: 112/78 (04-02-22 @ 06:45) (101/65 - 131/77)  RR: 18 (04-02-22 @ 06:45) (16 - 18)  SpO2: 100% (04-02-22 @ 06:45) (99% - 100%)    PHYSICAL EXAM:  General: NAD  Eyes: Sclera clear  ENMT: Moist mucous membranes  Chest: Clear to auscultation bilaterally; no rales, rhonchi, or wheezing  Heart: Regular rate and rhythm; normal S1 and S2; no murmurs, rubs, or gallops  Abd: Soft, nontender to light and deep palpation, nondistended  Nervous System: AAOX3  Psych: Patient displays child-like behavior that is at odd with her age group, teary with sad affect  Ext: no peripheral LE edema bilaterally; tenderness to palpation of LE extremity; RLE 4/5 strength and LLE 3/5 but assessment limited by pain; patient was able to walk     LABS:                        11.4   8.07  )-----------( 120      ( 01 Apr 2022 18:12 )             34.2     01 Apr 2022 18:12    140    |  107    |  8      ----------------------------<  158    5.0     |  19     |  0.84     Ca    9.4        01 Apr 2022 18:12  Mg     1.80      01 Apr 2022 18:12    TPro  7.5    /  Alb  4.1    /  TBili  0.3    /  DBili  x      /  AST  35     /  ALT  27     /  AlkPhos  51     01 Apr 2022 18:12    CAPILLARY BLOOD GLUCOSE        BLOOD CULTURE    RADIOLOGY & ADDITIONAL TESTS:    Imaging Personally Reviewed:  [X ] YES     Consultant(s) Notes Reviewed:  Yes    Care Discussed with Consultants/Other Providers: Yes

## 2022-04-02 NOTE — H&P ADULT - NSHPPHYSICALEXAM_GEN_ALL_CORE
Vital Signs Last 24 Hrs  T(C): 37.1 (02 Apr 2022 06:45), Max: 37.1 (01 Apr 2022 23:57)  T(F): 98.7 (02 Apr 2022 06:45), Max: 98.7 (01 Apr 2022 23:57)  HR: 80 (02 Apr 2022 06:45) (80 - 105)  BP: 112/78 (02 Apr 2022 06:45) (101/65 - 131/77)  BP(mean): --  RR: 18 (02 Apr 2022 06:45) (16 - 18)  SpO2: 100% (02 Apr 2022 06:45) (99% - 100%)    **** PHYSICAL EXAM LIMITED, AS PATIENT CURRENTLY SEDATED AND ONLY BRIEFLY AWAKES, THEN FALLS ASLEEP WITHOUT FOLLOWING INSTRUCTIONS TO PERFORM EXAM MANEUVERS. ****

## 2022-04-02 NOTE — PROGRESS NOTE ADULT - PROBLEM SELECTOR PLAN 2
3/25/22; B/L LE US:  Deep venous thrombosis in the left popliteal vein, at the knee.  Continue Eliquis 5 mg po BID.  Monitor CBC. 3/25/22; bilateral LE US: Deep venous thrombosis in the left popliteal vein, at the knee.  Continue Eliquis 5 mg po BID.  Monitor CBC

## 2022-04-02 NOTE — PATIENT PROFILE ADULT - FALL HARM RISK - HARM RISK INTERVENTIONS
Assistance with ambulation/Assistance OOB with selected safe patient handling equipment/Communicate Risk of Fall with Harm to all staff/Monitor for mental status changes/Monitor gait and stability/Reinforce activity limits and safety measures with patient and family/Reorient to person, place and time as needed/Review medications for side effects contributing to fall risk/Sit up slowly, dangle for a short time, stand at bedside before walking/Tailored Fall Risk Interventions/Use of alarms - bed, chair and/or voice tab/Visual Cue: Yellow wristband and red socks/Bed in lowest position, wheels locked, appropriate side rails in place/Call bell, personal items and telephone in reach/Instruct patient to call for assistance before getting out of bed or chair/Non-slip footwear when patient is out of bed/[place] to call system/Physically safe environment - no spills, clutter or unnecessary equipment/Purposeful Proactive Rounding/Room/bathroom lighting operational, light cord in reach

## 2022-04-02 NOTE — PROGRESS NOTE ADULT - ASSESSMENT
36F history of Factor 5 Leiden Deficiency, Seizures, DVT/ PE on Xarelto admitted for b/l LE weakness.  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

## 2022-04-02 NOTE — H&P ADULT - PROBLEM SELECTOR PLAN 1
B/L LE weakness   CT head: No evidence of acute transcortical infarct, acute intracranial hemorrhage, or mass effect.  Currently unable to assess strength due to pt not following request.   Seen by neurology.  F/U PT, OT.  CRISTINA pitts called.   Fall precautions. Pt with subjective b/l LE weakness, but noted to have full motor strength in b/l LE at [facility] and by Neurology here. CTH negative.   - Neurology consult obtained on admission, recs appreciated  - F/u b/l LE venous dopplers   - Neuro checks per routine  - PT/OT eval  - Psychiatry consult called, as pt can be feigning symptoms for secondary gain  - Fall risk protocol

## 2022-04-02 NOTE — H&P ADULT - GASTROINTESTINAL DETAILS
soft/nontender/bowel sounds normal/no guarding soft/nontender/no distention/bowel sounds normal/no rebound tenderness/no guarding

## 2022-04-02 NOTE — H&P ADULT - PROBLEM SELECTOR PLAN 6
VTE covered with Eliquis.  Fall, Aspiration, safety and seizure precautions.   PT, OT, neuro eval Continue Eliquis 5 mg po BID. - C/w Eliquis 5 mg bid

## 2022-04-02 NOTE — H&P ADULT - PROBLEM SELECTOR PLAN 2
3/25/22; B/L LE US:  Deep venous thrombosis in the left popliteal vein, at the knee.  Continue Eliquis 5 mg po BID.  Monitor CBC. Pt newly diagnosed with DVT in the left popliteal vein, now on Eliquis.  - C/w Eliquis 5 mg bid  - F/u b/l LE venous dopplers

## 2022-04-02 NOTE — BH CONSULTATION LIAISON ASSESSMENT NOTE - SUMMARY
35yo single, disabled, female domiciled in [facility], PMHx Factor V Leiden deficiency, DVT/PE, seizure d/o, asthma, endometriosis, intellectual disability, vitamine D deficiency, chronic ischemic heart disease, IBS, GERD, PID, headache syndrome, hypothyroidism, PPHx extensive psychiatric (schizoaffective vs. bipolar, personality d/o, mood d/o), outpatient care at the [facility], multiple inpatient psychiatric admissions most recent [place] 7/20-8/21, hx crack/cocaine use dos, prior legal issues (weapons possession, harassment charges), two prior SA (overdose 2017), hx SIB, presents to [facility] ED with subjective b/l LE weakness.    Patient seen, a&o, patient angry, irritable and making threats to "blow up the building," patient with poor attention span requiring frequent repeating and redirection, she has very concrete thinking with poor insight. Patient denies suicidal ideation, denies auditory hallucinations, endorses visual hallucination-seeing "grey shadows."    Discussed with primary team recommendations below:    PLAN:  -c/w 1:1 observation given aggression, impulsivity, risk for elopement  -c/w home medications: Latuda 40mg, Abilify 30mg, Seroquel 100mg bid  -AGITATION: Thorazine 50mg + Ativan 2mg po/iv/im q6h prn  -Monitor EKG qtc interval-hold psychotropics if qtc >500  - low threshold for PRN use given hx of aggressive behavior  - FAST discharge back to residence once medically cleared   - Patient has a legal guardian Maria Elena Brown [phone]; Patient CANNOT refuse transfer back to group home when medically stable     37yo single, disabled, female domiciled in [facility], PMHx Factor V Leiden deficiency, DVT/PE, seizure d/o, asthma, endometriosis, intellectual disability, vitamine D deficiency, chronic ischemic heart disease, IBS, GERD, PID, headache syndrome, hypothyroidism, PPHx extensive psychiatric (schizoaffective vs. bipolar, personality d/o, mood d/o), outpatient care at the [facility], multiple inpatient psychiatric admissions most recent [place] 7/20-8/21, hx crack/cocaine use dos, prior legal issues (weapons possession, harassment charges), two prior SA (overdose 2017), hx SIB, presents to [facility] ED with subjective b/l LE weakness.    Patient seen, a&o, patient angry, irritable and making threats to "blow up the building," patient with poor attention span requiring frequent repeating and redirection, she has very concrete thinking with poor insight. Patient denies suicidal ideation, denies auditory hallucinations, endorses visual hallucination-seeing "grey shadows."    Received call from primary team, Dr. Fabian patient endorsing suicidal ideation with no intent or plan, recommend continuation of 1:1 constant observation, CL psy will follow.    Discussed with primary team recommendations below:    PLAN:  -c/w 1:1 observation given aggression, impulsivity, risk for elopement-expressing suicidal ideation without plan or intent per primary team  -c/w home medications: Latuda 40mg, Abilify 30mg, Seroquel 100mg bid  -AGITATION: Thorazine 50mg + Ativan 2mg po/iv/im q6h prn  -Monitor EKG qtc interval-hold psychotropics if qtc >500  - low threshold for PRN use given hx of aggressive behavior  - FAST discharge back to residence once medically cleared   - Patient has a legal guardian Maria Elenadanilo Brown [phone]; Patient CANNOT refuse transfer back to group home when medically stable  -Patient CANNOT leave AMA-requires psychiatric clearance for discharge     35yo single, disabled, female domiciled in [facility], PMHx Factor V Leiden deficiency, DVT/PE, seizure d/o, asthma, endometriosis, intellectual disability, vitamine D deficiency, chronic ischemic heart disease, IBS, GERD, PID, headache syndrome, hypothyroidism, PPHx extensive psychiatric (schizoaffective vs. bipolar, personality d/o, mood d/o), outpatient care at the [facility], multiple inpatient psychiatric admissions most recent [place] 7/20-8/21, hx crack/cocaine use dos, prior legal issues (weapons possession, harassment charges), two prior SA (overdose 2017), hx SIB, presents to [facility] ED with subjective b/l LE weakness.    Patient seen, a&o, patient angry, irritable and making threats to "blow up the building," patient with poor attention span requiring frequent repeating and redirection, she has very concrete thinking with poor insight. Patient denies suicidal ideation, denies auditory hallucinations, endorses visual hallucination-seeing "grey shadows."    Received call from primary team, Dr. Fabian patient endorsing contextual suicidal ideation with no intent or plan, recommend continuation of 1:1 constant observation, CL psy will follow.    Discussed with primary team recommendations below:    PLAN:  -c/w 1:1 observation given aggression, impulsivity, risk for elopement-expressing suicidal ideation without plan or intent per primary team  -c/w home medications: Latuda 40mg, Abilify 30mg, Seroquel 100mg bid  -AGITATION: Thorazine 50mg + Ativan 2mg po/iv/im q6h prn  -Monitor EKG qtc interval-hold psychotropics if qtc >500  - low threshold for PRN use given hx of aggressive behavior  - FAST discharge back to residence once medically cleared   - Patient has a legal guardian Maria Elenadanilo Brown [phone]; Patient CANNOT refuse transfer back to group home when medically stable  -Patient CANNOT leave AMA-requires psychiatric clearance for discharge     37yo single, disabled, female domiciled in [facility], PMHx Factor V Leiden deficiency, DVT/PE, seizure d/o, asthma, endometriosis, intellectual disability, vitamine D deficiency, chronic ischemic heart disease, IBS, GERD, PID, headache syndrome, hypothyroidism, as per chart: PPHx extensive psychiatric (schizoaffective vs. bipolar, personality d/o, mood d/o), outpatient care at the [facility], multiple inpatient psychiatric admissions most recent [place] 7/20-8/21, hx crack/cocaine use dos, prior legal issues (weapons possession, harassment charges), two prior SA (overdose 2017), hx SIB, presents to [facility] ED with subjective b/l LE weakness.    Patient seen, a&o, patient angry, irritable and making provocative statements/ threats ,  patient with poor attention span requiring frequent repeating and redirection, she has very concrete thinking with poor insight. Patient denies suicidal ideation, denies auditory hallucinations, endorses visual hallucination-seeing "grey shadows."    Received call from primary team, Dr. Fabian patient endorsing contextual suicidal ideation with no intent or plan, recommend continuation of 1:1 constant observation, CL psy will follow.    Discussed with primary team recommendations below:    PLAN:  -c/w 1:1 observation given aggression, impulsivity, risk for elopement-expressing suicidal ideation without plan or intent per primary team  -c/w home medications: Latuda 40mg, Abilify 30mg, Seroquel 100mg bid  -AGITATION: PRN Thorazine 50mg PO/IM q6h + Ativan 2mg po/iv/im q4h PRN  - Monitor EKG qtc interval-hold psychotropics if qtc >500  - low threshold for PRN use/ restraints/calling security given hx of aggressive behavior  - NO LOOSE or SHARP  objects within patient's reach.   - NO METALS utensils.   -FAST discharge back to residence once medically/psychiatrically  cleared  - Patient has a legal guardian Maria Elena Brown [phone]; Patient CANNOT refuse transfer back to group home when medically stable  - Patient CANNOT leave AMA-requires psychiatric clearance for discharge

## 2022-04-02 NOTE — H&P ADULT - PROBLEM SELECTOR PLAN 4
Currently stable at this time.  Not on any hormones. Pt in ICU at [facility] for severe agitation requiring Pre  - Psychiatry consult called, as pt can be feigning symptoms for secondary gain  depakote 500mg PO in am & 1000mg PO qhs, continue Latuda 40mg, Abilify 30mg, Seroquel to 100mg Po bid Pt in ICU at [facility] for severe agitation requiring Precedex gtt. Currently sedated.   - Psychiatry consult called overnight, f/u recs  - Per Behavioral Health note from [facility], recommended Depakote 500 mg PO in AM & 1000 mg PO qHS, Latuda 40 mg daily, Abilify 30 mg daily, and Seroquel 100 mg bid  - Will start IV Ativan 1 mg q6hrs PRN for agitation  - Monitor QTc with serial EKGs  - Currently, no indication for constant observation

## 2022-04-02 NOTE — H&P ADULT - ASSESSMENT
36F history of Factor 5 Leiden Deficiency, Seizures, DVT/ PE on Xarelto Endometriosis, Asthma admitted for b/l LE weakness.     Neurology consult appreciated.   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 bipolar, personality d/o) presents with subjective b/l LE weakness.

## 2022-04-02 NOTE — PROGRESS NOTE ADULT - PROBLEM SELECTOR PLAN 1
B/L LE weakness   CT head: No evidence of acute transcortical infarct, acute intracranial hemorrhage, or mass effect.  Currently unable to assess strength due to pt not following request.   Seen by neurology.  F/U PT, OT.  CRISTINA pitts called.   Fall precautions. B/L LE weakness  CT head: No evidence of acute transcortical infarct, acute intracranial hemorrhage, or mass effect.  Currently unable to assess strength due to pt not following request.   Seen by neurology.  F/U PT, OT.  CRISTINA pitts called.   Fall precautions.

## 2022-04-02 NOTE — H&P ADULT - PROBLEM SELECTOR PLAN 5
Continue Eliquis 5 mg po BID. No S/S of exacerbation.  - C/w albuterol PRN and Asmanex  - Supplemental O2 PRN

## 2022-04-02 NOTE — H&P ADULT - PROBLEM SELECTOR PLAN 7
Currently stable at this time.  Not on any hormones. Currently stable at this time.  Not on any medications.

## 2022-04-03 PROCEDURE — 93010 ELECTROCARDIOGRAM REPORT: CPT

## 2022-04-03 PROCEDURE — 99232 SBSQ HOSP IP/OBS MODERATE 35: CPT | Mod: GC

## 2022-04-03 RX ORDER — APIXABAN 2.5 MG/1
1 TABLET, FILM COATED ORAL
Qty: 60 | Refills: 0
Start: 2022-04-03 | End: 2022-05-02

## 2022-04-03 RX ORDER — QUETIAPINE FUMARATE 200 MG/1
200 TABLET, FILM COATED ORAL
Refills: 0 | Status: DISCONTINUED | OUTPATIENT
Start: 2022-04-03 | End: 2022-04-05

## 2022-04-03 RX ORDER — DIPHENHYDRAMINE HCL 50 MG
25 CAPSULE ORAL EVERY 6 HOURS
Refills: 0 | Status: DISCONTINUED | OUTPATIENT
Start: 2022-04-03 | End: 2022-04-06

## 2022-04-03 RX ORDER — HYDROCORTISONE 1 %
1 OINTMENT (GRAM) TOPICAL ONCE
Refills: 0 | Status: DISCONTINUED | OUTPATIENT
Start: 2022-04-03 | End: 2022-04-07

## 2022-04-03 RX ADMIN — QUETIAPINE FUMARATE 100 MILLIGRAM(S): 200 TABLET, FILM COATED ORAL at 06:15

## 2022-04-03 RX ADMIN — Medication 75 MICROGRAM(S): at 06:15

## 2022-04-03 RX ADMIN — DIVALPROEX SODIUM 1000 MILLIGRAM(S): 500 TABLET, DELAYED RELEASE ORAL at 21:21

## 2022-04-03 RX ADMIN — QUETIAPINE FUMARATE 200 MILLIGRAM(S): 200 TABLET, FILM COATED ORAL at 16:20

## 2022-04-03 RX ADMIN — Medication 325 MILLIGRAM(S): at 15:56

## 2022-04-03 RX ADMIN — APIXABAN 5 MILLIGRAM(S): 2.5 TABLET, FILM COATED ORAL at 06:15

## 2022-04-03 RX ADMIN — Medication 2 MILLIGRAM(S): at 23:25

## 2022-04-03 RX ADMIN — DIVALPROEX SODIUM 500 MILLIGRAM(S): 500 TABLET, DELAYED RELEASE ORAL at 15:56

## 2022-04-03 RX ADMIN — Medication 25 MILLIGRAM(S): at 22:06

## 2022-04-03 RX ADMIN — PANTOPRAZOLE SODIUM 40 MILLIGRAM(S): 20 TABLET, DELAYED RELEASE ORAL at 06:15

## 2022-04-03 RX ADMIN — MOMETASONE FUROATE 1 PUFF(S): 220 INHALANT RESPIRATORY (INHALATION) at 15:56

## 2022-04-03 RX ADMIN — CYCLOBENZAPRINE HYDROCHLORIDE 5 MILLIGRAM(S): 10 TABLET, FILM COATED ORAL at 21:21

## 2022-04-03 RX ADMIN — ARIPIPRAZOLE 30 MILLIGRAM(S): 15 TABLET ORAL at 15:56

## 2022-04-03 RX ADMIN — APIXABAN 5 MILLIGRAM(S): 2.5 TABLET, FILM COATED ORAL at 19:28

## 2022-04-03 RX ADMIN — CYCLOBENZAPRINE HYDROCHLORIDE 5 MILLIGRAM(S): 10 TABLET, FILM COATED ORAL at 15:56

## 2022-04-03 NOTE — PROGRESS NOTE ADULT - ASSESSMENT
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

## 2022-04-03 NOTE — PROGRESS NOTE ADULT - PROBLEM SELECTOR PLAN 1
B/L LE weakness  CT head: No evidence of acute transcortical infarct, acute intracranial hemorrhage, or mass effect.  Currently unable to assess strength due to pt not following request.   Seen by neurology.  F/U PT, OT.  CRISTINA pitts called.   Fall precautions.

## 2022-04-03 NOTE — PROGRESS NOTE ADULT - SUBJECTIVE AND OBJECTIVE BOX
Patient is a 36y old  Female who presents with a chief complaint of B/l LE weakness (02 Apr 2022 07:09)     INTERVAL HPI/OVERNIGHT EVENTS:  - No acute events overnight    SUBJECTIVE  - Patient seen and evaluated at bedside  - Patient reports presence of   - Patient reports absence of fevers, chills, HA, lightheadedness, dizziness, nausea, emesis, chest pain, dyspnea, palpitations, abd pain, diarrhea, urinary symptoms, skin color changes or rashes, or LE edema     MEDICATIONS  (STANDING):  apixaban 5 milliGRAM(s) Oral every 12 hours  ARIPiprazole 30 milliGRAM(s) Oral daily  diVALproex  milliGRAM(s) Oral daily  diVALproex DR 1000 milliGRAM(s) Oral at bedtime  ferrous    sulfate 325 milliGRAM(s) Oral daily  levothyroxine 75 MICROGram(s) Oral daily  lurasidone 40 milliGRAM(s) Oral daily  mometasone 220 MICROgram(s) Inhaler 1 Puff(s) Inhalation daily  pantoprazole    Tablet 40 milliGRAM(s) Oral before breakfast  QUEtiapine 100 milliGRAM(s) Oral two times a day    MEDICATIONS  (PRN):  ALBUTerol    90 MICROgram(s) HFA Inhaler 2 Puff(s) Inhalation every 6 hours PRN Shortness of Breath and/or Wheezing  chlorproMAZINE    Injectable 50 milliGRAM(s) IntraMuscular every 6 hours PRN agitation  cyclobenzaprine 5 milliGRAM(s) Oral three times a day PRN Muscle Spasm  diphenhydrAMINE Injectable 25 milliGRAM(s) IV Push every 6 hours PRN Combative behavior  LORazepam   Injectable 2 milliGRAM(s) IV Push every 4 hours PRN Agitation    Allergies:  latex (Blisters)  morphine (Unknown)  penicillin (Hives)  penicillin (Other)  pineapple (Unknown)  Toradol (Rash)  Toradol (Unknown)  Zofran (Hives)  Zofran (Unknown)    Intolerances:      REVIEW OF SYSTEMS: As indicated above; otherwise, negative    VITAL SIGNS:  T(F): 99.1 (04-03-22 @ 07:15), Max: 99.1 (04-03-22 @ 07:15)  HR: 97 (04-03-22 @ 07:15) (85 - 109)  BP: 113/66 (04-03-22 @ 07:15) (101/63 - 124/73)  RR: 18 (04-03-22 @ 07:15) (17 - 19)  SpO2: 97% (04-03-22 @ 07:15) (95% - 100%)    PHYSICAL EXAM:  General: NAD, well-groomed, well-developed  Eyes: Conjunctiva and sclera clear  ENMT: Moist mucous membranes  Neck: No palpable pre-auricular, post-auricular, occipital, mandibular, submental, supra-clavicular, or infra-clavicular lymph nodes   Chest: Clear to auscultation bilaterally; no rales, rhonchi, or wheezing  Heart: Regular rate and rhythm; normal S1 and S2; no murmurs, rubs, or gallops  Abd: Soft, nontender, nondistended  Nervous System: AAOX3  Psych: Appropriate affect  Ext: no peripheral LE edema bilaterally    LABS:      Ca    9.4        01 Apr 2022 18:12      CAPILLARY BLOOD GLUCOSE        BLOOD CULTURE  04-01 @ 17:05   <10,000 CFU/mL Normal Urogenital Samira  --  --    RADIOLOGY & ADDITIONAL TESTS:    Imaging Personally Reviewed:  [X ] YES     Consultant(s) Notes Reviewed:  Yes    Care Discussed with Consultants/Other Providers: Yes Patient is a 36y old  Female who presents with a chief complaint of B/l LE weakness (02 Apr 2022 07:09)     INTERVAL HPI/OVERNIGHT EVENTS:  - No acute events overnight    SUBJECTIVE  - Patient seen and evaluated at bedside  - Patient was very drowsy from psychotropic medications  - Patient reports absence of fevers, chest pain, abd pain    MEDICATIONS  (STANDING):  apixaban 5 milliGRAM(s) Oral every 12 hours  ARIPiprazole 30 milliGRAM(s) Oral daily  diVALproex  milliGRAM(s) Oral daily  diVALproex DR 1000 milliGRAM(s) Oral at bedtime  ferrous    sulfate 325 milliGRAM(s) Oral daily  levothyroxine 75 MICROGram(s) Oral daily  lurasidone 40 milliGRAM(s) Oral daily  mometasone 220 MICROgram(s) Inhaler 1 Puff(s) Inhalation daily  pantoprazole    Tablet 40 milliGRAM(s) Oral before breakfast  QUEtiapine 100 milliGRAM(s) Oral two times a day    MEDICATIONS  (PRN):  ALBUTerol    90 MICROgram(s) HFA Inhaler 2 Puff(s) Inhalation every 6 hours PRN Shortness of Breath and/or Wheezing  chlorproMAZINE    Injectable 50 milliGRAM(s) IntraMuscular every 6 hours PRN agitation  cyclobenzaprine 5 milliGRAM(s) Oral three times a day PRN Muscle Spasm  diphenhydrAMINE Injectable 25 milliGRAM(s) IV Push every 6 hours PRN Combative behavior  LORazepam   Injectable 2 milliGRAM(s) IV Push every 4 hours PRN Agitation    Allergies:  latex (Blisters)  morphine (Unknown)  penicillin (Hives)  penicillin (Other)  pineapple (Unknown)  Toradol (Rash)  Toradol (Unknown)  Zofran (Hives)  Zofran (Unknown)    Intolerances:      REVIEW OF SYSTEMS: As indicated above    VITAL SIGNS:  T(F): 99.1 (04-03-22 @ 07:15), Max: 99.1 (04-03-22 @ 07:15)  HR: 97 (04-03-22 @ 07:15) (85 - 109)  BP: 113/66 (04-03-22 @ 07:15) (101/63 - 124/73)  RR: 18 (04-03-22 @ 07:15) (17 - 19)  SpO2: 97% (04-03-22 @ 07:15) (95% - 100%)    PHYSICAL EXAM:  General: NAD, well-groomed, well-developed  Chest: Clear to auscultation bilaterally; no rales, rhonchi, or wheezing  Heart: Regular rate and rhythm; normal S1 and S2; no murmurs, rubs, or gallops  Abd: Soft, nontender, nondistended  Nervous System: AAOX3  Psych: Appropriate affect  Ext: no peripheral LE edema bilaterally    LABS:      Ca    9.4        01 Apr 2022 18:12      CAPILLARY BLOOD GLUCOSE        BLOOD CULTURE  04-01 @ 17:05   <10,000 CFU/mL Normal Urogenital Samira  --  --    RADIOLOGY & ADDITIONAL TESTS:    Imaging Personally Reviewed:  [X ] YES     Consultant(s) Notes Reviewed:  Yes    Care Discussed with Consultants/Other Providers: Yes

## 2022-04-03 NOTE — BH CONSULTATION LIAISON PROGRESS NOTE - NSBHASSESSMENTFT_PSY_ALL_CORE
35yo single, disabled, female domiciled in [facility] group home, PMHx Factor V Leiden deficiency, DVT/PE, seizure d/o, asthma, endometriosis, intellectual disability, vitamine D deficiency, chronic ischemic heart disease, IBS, GERD, PID, headache syndrome, hypothyroidism, as per chart: PPHx extensive psychiatric (schizoaffective vs. bipolar, personality d/o, mood d/o), outpatient care at the [facility], multiple inpatient psychiatric admissions most recent [place] 7/20-8/21, hx crack/cocaine use dos, prior legal issues (weapons possession, harassment charges), two prior SA (overdose 2017), hx SIB, presents to [facility] ED with subjective b/l LE weakness.    Patient seen, a&o, patient angry, irritable and making provocative statements/ threats ,  patient with poor attention span requiring frequent repeating and redirection, she has very concrete thinking with poor insight. Patient denies suicidal ideation, denies auditory hallucinations, endorses visual hallucination-seeing "grey shadows."    4/3: pt asleep during exam this AM. Per RN staff, pt continues to have episodes of aggression and agitation, requiring PRNs. Pt has not been compliant with Abilify, but is taking Seroquel 100mg BID and Depakote 500mg QD and 1000mg QHS.     Discussed with primary team recommendations below:    PLAN:  -c/w 1:1 observation given aggression, impulsivity, risk for elopement-expressing suicidal ideation without plan or intent per primary team  -c/w home medications: Latuda 40mg, Abilify 30mg; Increase Seroquel to 200mg BID**  -AGITATION: PRN Thorazine 50mg PO/IM q6h + Ativan 2mg po/iv/im q4h PRN  - Monitor EKG qtc interval-hold psychotropics if qtc >500  - low threshold for PRN use/ restraints/calling security given hx of aggressive behavior  - NO LOOSE or SHARP  objects within patient's reach.   - NO METALS utensils.   -FAST discharge back to residence once medically/psychiatrically  cleared  - Patient has a legal guardian Maria Elena Brown [phone]; Patient CANNOT refuse transfer back to group home when medically stable  - Patient CANNOT leave AMA-requires psychiatric clearance for discharge 35yo single, disabled, female domiciled in [facility], PMHx Factor V Leiden deficiency, DVT/PE, seizure d/o, asthma, endometriosis, intellectual disability, vitamine D deficiency, chronic ischemic heart disease, IBS, GERD, PID, headache syndrome, hypothyroidism, as per chart: PPHx extensive psychiatric (schizoaffective vs. bipolar, personality d/o, mood d/o), outpatient care at the [facility], multiple inpatient psychiatric admissions most recent [place] 7/20-8/21, hx crack/cocaine use dos, prior legal issues (weapons possession, harassment charges), two prior SA (overdose 2017), hx SIB, presents to [facility] ED with subjective b/l LE weakness.    Patient seen, a&o, patient angry, irritable and making provocative statements/ threats ,  patient with poor attention span requiring frequent repeating and redirection, she has very concrete thinking with poor insight. Patient denies suicidal ideation, denies auditory hallucinations, endorses visual hallucination-seeing "grey shadows."    4/3: pt asleep during exam this AM. Per RN staff, pt continues to have episodes of aggression and agitation, requiring PRNs. Pt has not been compliant with Abilify, but is taking Seroquel 100mg BID and Depakote 500mg QD and 1000mg QHS.     Discussed with primary team recommendations below:    PLAN:  -c/w 1:1 observation given aggression, impulsivity, risk for elopement-expressing suicidal ideation without plan or intent per primary team  -c/w home medications: Latuda 40mg, Abilify 30mg; Increase Seroquel to 200mg BID** (HOLD for sedation and monitor pulse ox closely)  -AGITATION: PRN Thorazine 50mg PO/IM q6h + Ativan 2mg po/iv/im q4h PRN  - Monitor EKG qtc interval-hold psychotropics if qtc >500  - low threshold for PRN use/ restraints/calling security given hx of aggressive behavior  - NO LOOSE or SHARP  objects within patient's reach.   - NO METALS utensils.   -FAST discharge back to residence once medically/psychiatrically  cleared  - Patient has a legal guardian Maria Elena Brown [phone]; Patient CANNOT refuse transfer back to group home when medically stable  - Patient CANNOT leave AMA-requires psychiatric clearance for discharge

## 2022-04-03 NOTE — PROGRESS NOTE ADULT - PROBLEM SELECTOR PLAN 2
3/25/22; bilateral LE US: Deep venous thrombosis in the left popliteal vein, at the knee.  Continue Eliquis 5 mg po BID.  Monitor CBC

## 2022-04-03 NOTE — CHART NOTE - NSCHARTNOTEFT_GEN_A_CORE
Patient was on restraints for violence that had lapsed and needed to be renewed as planned by night float. During the day, I was unaware of the need for renewal of order at the specified time needed. However, I was aware that patient needed to be on restraints. Restraints were renewed for protection of patient and harm of potential patient to herself. However, after assessment of patient, she is non-violent as she has been taking his psych medications. Restraints subsequently removed.

## 2022-04-04 ENCOUNTER — TRANSCRIPTION ENCOUNTER (OUTPATIENT)
Age: 37
End: 2022-04-04

## 2022-04-04 DIAGNOSIS — R46.89 OTHER SYMPTOMS AND SIGNS INVOLVING APPEARANCE AND BEHAVIOR: ICD-10-CM

## 2022-04-04 DIAGNOSIS — F14.90 COCAINE USE, UNSPECIFIED, UNCOMPLICATED: ICD-10-CM

## 2022-04-04 DIAGNOSIS — E66.9 OBESITY, UNSPECIFIED: ICD-10-CM

## 2022-04-04 DIAGNOSIS — J45.901 UNSPECIFIED ASTHMA WITH (ACUTE) EXACERBATION: ICD-10-CM

## 2022-04-04 DIAGNOSIS — Z78.1 PHYSICAL RESTRAINT STATUS: ICD-10-CM

## 2022-04-04 DIAGNOSIS — I82.432 ACUTE EMBOLISM AND THROMBOSIS OF LEFT POPLITEAL VEIN: ICD-10-CM

## 2022-04-04 DIAGNOSIS — Z86.718 PERSONAL HISTORY OF OTHER VENOUS THROMBOSIS AND EMBOLISM: ICD-10-CM

## 2022-04-04 DIAGNOSIS — E03.9 HYPOTHYROIDISM, UNSPECIFIED: ICD-10-CM

## 2022-04-04 DIAGNOSIS — Z91.14 PATIENT'S OTHER NONCOMPLIANCE WITH MEDICATION REGIMEN: ICD-10-CM

## 2022-04-04 DIAGNOSIS — G40.909 EPILEPSY, UNSPECIFIED, NOT INTRACTABLE, WITHOUT STATUS EPILEPTICUS: ICD-10-CM

## 2022-04-04 DIAGNOSIS — D68.51 ACTIVATED PROTEIN C RESISTANCE: ICD-10-CM

## 2022-04-04 DIAGNOSIS — Z79.01 LONG TERM (CURRENT) USE OF ANTICOAGULANTS: ICD-10-CM

## 2022-04-04 DIAGNOSIS — F31.9 BIPOLAR DISORDER, UNSPECIFIED: ICD-10-CM

## 2022-04-04 LAB — GLUCOSE BLDC GLUCOMTR-MCNC: 81 MG/DL — SIGNIFICANT CHANGE UP (ref 70–99)

## 2022-04-04 PROCEDURE — 99232 SBSQ HOSP IP/OBS MODERATE 35: CPT

## 2022-04-04 PROCEDURE — 99233 SBSQ HOSP IP/OBS HIGH 50: CPT | Mod: GC

## 2022-04-04 RX ORDER — QUETIAPINE FUMARATE 200 MG/1
100 TABLET, FILM COATED ORAL ONCE
Refills: 0 | Status: COMPLETED | OUTPATIENT
Start: 2022-04-04 | End: 2022-04-04

## 2022-04-04 RX ORDER — QUETIAPINE FUMARATE 200 MG/1
50 TABLET, FILM COATED ORAL EVERY 8 HOURS
Refills: 0 | Status: DISCONTINUED | OUTPATIENT
Start: 2022-04-04 | End: 2022-04-05

## 2022-04-04 RX ORDER — HALOPERIDOL DECANOATE 100 MG/ML
1 INJECTION INTRAMUSCULAR ONCE
Refills: 0 | Status: COMPLETED | OUTPATIENT
Start: 2022-04-04 | End: 2022-04-04

## 2022-04-04 RX ORDER — DIPHENHYDRAMINE HCL 50 MG
25 CAPSULE ORAL ONCE
Refills: 0 | Status: COMPLETED | OUTPATIENT
Start: 2022-04-04 | End: 2022-04-04

## 2022-04-04 RX ADMIN — Medication 2 MILLIGRAM(S): at 10:30

## 2022-04-04 RX ADMIN — Medication 2 MILLIGRAM(S): at 14:51

## 2022-04-04 RX ADMIN — Medication 25 MILLIGRAM(S): at 09:21

## 2022-04-04 RX ADMIN — DIVALPROEX SODIUM 500 MILLIGRAM(S): 500 TABLET, DELAYED RELEASE ORAL at 13:01

## 2022-04-04 RX ADMIN — APIXABAN 5 MILLIGRAM(S): 2.5 TABLET, FILM COATED ORAL at 06:46

## 2022-04-04 RX ADMIN — Medication 25 MILLIGRAM(S): at 00:50

## 2022-04-04 RX ADMIN — QUETIAPINE FUMARATE 200 MILLIGRAM(S): 200 TABLET, FILM COATED ORAL at 06:47

## 2022-04-04 RX ADMIN — Medication 25 MILLIGRAM(S): at 15:44

## 2022-04-04 RX ADMIN — Medication 2 MILLIGRAM(S): at 03:37

## 2022-04-04 RX ADMIN — Medication 2 MILLIGRAM(S): at 01:13

## 2022-04-04 RX ADMIN — DIVALPROEX SODIUM 1000 MILLIGRAM(S): 500 TABLET, DELAYED RELEASE ORAL at 22:23

## 2022-04-04 RX ADMIN — HALOPERIDOL DECANOATE 1 MILLIGRAM(S): 100 INJECTION INTRAMUSCULAR at 03:44

## 2022-04-04 RX ADMIN — Medication 25 MILLIGRAM(S): at 22:24

## 2022-04-04 RX ADMIN — QUETIAPINE FUMARATE 100 MILLIGRAM(S): 200 TABLET, FILM COATED ORAL at 02:41

## 2022-04-04 RX ADMIN — ARIPIPRAZOLE 30 MILLIGRAM(S): 15 TABLET ORAL at 13:02

## 2022-04-04 RX ADMIN — QUETIAPINE FUMARATE 200 MILLIGRAM(S): 200 TABLET, FILM COATED ORAL at 17:12

## 2022-04-04 RX ADMIN — APIXABAN 5 MILLIGRAM(S): 2.5 TABLET, FILM COATED ORAL at 17:12

## 2022-04-04 RX ADMIN — QUETIAPINE FUMARATE 100 MILLIGRAM(S): 200 TABLET, FILM COATED ORAL at 13:02

## 2022-04-04 RX ADMIN — Medication 50 MILLIGRAM(S): at 03:13

## 2022-04-04 RX ADMIN — Medication 75 MICROGRAM(S): at 06:47

## 2022-04-04 RX ADMIN — Medication 2 MILLIGRAM(S): at 23:39

## 2022-04-04 RX ADMIN — Medication 325 MILLIGRAM(S): at 13:02

## 2022-04-04 RX ADMIN — QUETIAPINE FUMARATE 50 MILLIGRAM(S): 200 TABLET, FILM COATED ORAL at 22:23

## 2022-04-04 RX ADMIN — Medication 50 MILLIGRAM(S): at 11:53

## 2022-04-04 RX ADMIN — PANTOPRAZOLE SODIUM 40 MILLIGRAM(S): 20 TABLET, DELAYED RELEASE ORAL at 06:46

## 2022-04-04 NOTE — PROGRESS NOTE ADULT - PROBLEM SELECTOR PLAN 1
B/L LE weakness  CT head: No evidence of acute transcortical infarct, acute intracranial hemorrhage, or mass effect.  Currently unable to assess strength due to pt not following request.   Seen by neurology.  F/U PT, OT.  CRISTINA pitts called.   Fall precautions. Psychiatric hx: bipolar/schizoaffective disorder/explosive personality disorder. Multiple code kevin during hospital course requiring use of multiple security guards. Placed on violent restraints multiple times for aggression and violence against staff.  -c/w 1:1 observation given aggression, impulsivity, risk for elopement-expressing suicidal ideation without plan  - will clarify and c/w home medications: Latuda 40mg, Abilify 30mg  -Psych recs appreciated; they have cleared patient for discharge to patient's group home as per phone convo on 4/4/22  -Medication regimen:   -->Seroquel PO 200mg qd 8am  -->Seroquel PO 50mg qd 12pm  -->Seroquel PO 200mg qd 8pm  -->thorazine 50mg and ativan 2mg q4hrs PRN  - ECG on 4/3/22 without prolonged QTC

## 2022-04-04 NOTE — PROGRESS NOTE ADULT - PROBLEM SELECTOR PLAN 7
VTE covered with Eliquis.  Fall, Aspiration, safety and seizure precautions.   PT, OT, neuro eval Continue Eliquis 5 mg po BID.

## 2022-04-04 NOTE — PROGRESS NOTE ADULT - PROBLEM SELECTOR PLAN 2
3/25/22; bilateral LE US: Deep venous thrombosis in the left popliteal vein, at the knee.  Continue Eliquis 5 mg po BID.  Monitor CBC B/L LE weakness  CT head: No evidence of acute transcortical infarct, acute intracranial hemorrhage, or mass effect.  -patient ambulates without assistance  Seen by neurology.  F/U PT, OT.  CRISTINA horton appreciated   Fall precautions. Patient reported dysuria this morning without presence of hematuria  -Ordered UA and UCx  -Continue to monitor and will reassess

## 2022-04-04 NOTE — PROGRESS NOTE ADULT - SUBJECTIVE AND OBJECTIVE BOX
Patient is a 36y old  Female who presents with a chief complaint of B/l LE weakness (04 Apr 2022 11:57)     INTERVAL HPI/OVERNIGHT EVENTS:  - No acute events overnight    SUBJECTIVE  - Patient seen and evaluated at bedside  - Patient reports presence of   - Patient reports absence of fevers, chills, HA, lightheadedness, dizziness, nausea, emesis, chest pain, dyspnea, palpitations, abd pain, diarrhea, urinary symptoms, skin color changes or rashes, or LE edema     MEDICATIONS  (STANDING):  apixaban 5 milliGRAM(s) Oral every 12 hours  ARIPiprazole 30 milliGRAM(s) Oral daily  diVALproex  milliGRAM(s) Oral daily  diVALproex DR 1000 milliGRAM(s) Oral at bedtime  ferrous    sulfate 325 milliGRAM(s) Oral daily  hydrocortisone 1% Cream 1 Application(s) Topical once  levothyroxine 75 MICROGram(s) Oral daily  lurasidone 40 milliGRAM(s) Oral daily  mometasone 220 MICROgram(s) Inhaler 1 Puff(s) Inhalation daily  pantoprazole    Tablet 40 milliGRAM(s) Oral before breakfast  QUEtiapine 200 milliGRAM(s) Oral two times a day    MEDICATIONS  (PRN):  ALBUTerol    90 MICROgram(s) HFA Inhaler 2 Puff(s) Inhalation every 6 hours PRN Shortness of Breath and/or Wheezing  chlorproMAZINE    Injectable 50 milliGRAM(s) IntraMuscular every 6 hours PRN agitation  cyclobenzaprine 5 milliGRAM(s) Oral three times a day PRN Muscle Spasm  diphenhydrAMINE Injectable 25 milliGRAM(s) IV Push every 6 hours PRN Sleep or agitation  LORazepam   Injectable 2 milliGRAM(s) IV Push every 4 hours PRN Agitation    Allergies:  latex (Blisters)  morphine (Unknown)  penicillin (Hives)  penicillin (Other)  pineapple (Unknown)  Toradol (Rash)  Toradol (Unknown)  Zofran (Hives)  Zofran (Unknown)    Intolerances:      REVIEW OF SYSTEMS: As indicated above; otherwise, negative    VITAL SIGNS:  T(F): 98.1 (04-04-22 @ 07:28), Max: 98.6 (04-03-22 @ 14:40)  HR: 87 (04-04-22 @ 07:28) (87 - 97)  BP: 115/73 (04-04-22 @ 07:28) (101/63 - 115/73)  RR: 17 (04-04-22 @ 07:28) (17 - 18)  SpO2: 100% (04-04-22 @ 07:28) (98% - 100%)    PHYSICAL EXAM:  General: NAD, well-groomed, well-developed  Eyes: Conjunctiva and sclera clear  ENMT: Moist mucous membranes  Neck: No palpable pre-auricular, post-auricular, occipital, mandibular, submental, supra-clavicular, or infra-clavicular lymph nodes   Chest: Clear to auscultation bilaterally; no rales, rhonchi, or wheezing  Heart: Regular rate and rhythm; normal S1 and S2; no murmurs, rubs, or gallops  Abd: Soft, nontender, nondistended  Nervous System: AAOX3  Psych: Appropriate affect  Ext: no peripheral LE edema bilaterally    LABS:          CAPILLARY BLOOD GLUCOSE      POCT Blood Glucose.: 81 mg/dL (04 Apr 2022 01:21)    BLOOD CULTURE  04-01 @ 17:05   <10,000 CFU/mL Normal Urogenital Samira  --  --    RADIOLOGY & ADDITIONAL TESTS:    Imaging Personally Reviewed:  [X ] YES     Consultant(s) Notes Reviewed:  Yes    Care Discussed with Consultants/Other Providers: Yes Patient is a 36y old  Female who presents with a chief complaint of B/l LE weakness (04 Apr 2022 11:57)     INTERVAL HPI/OVERNIGHT EVENTS:  - Placed on restraints for violence requiring seroquel, haldol  - Had seizure like activity and given ativan 2mg  - Had suicidal ideation     SUBJECTIVE  - Patient seen and evaluated at bedside  - Patient reports presence of left femoral pain and intermittent palpitations; reported dysuria  - Patient reports absence of fevers, nausea, chest pain, abd pain, diarrhea, or LE edema     MEDICATIONS  (STANDING):  apixaban 5 milliGRAM(s) Oral every 12 hours  ARIPiprazole 30 milliGRAM(s) Oral daily  diVALproex  milliGRAM(s) Oral daily  diVALproex DR 1000 milliGRAM(s) Oral at bedtime  ferrous    sulfate 325 milliGRAM(s) Oral daily  hydrocortisone 1% Cream 1 Application(s) Topical once  levothyroxine 75 MICROGram(s) Oral daily  lurasidone 40 milliGRAM(s) Oral daily  mometasone 220 MICROgram(s) Inhaler 1 Puff(s) Inhalation daily  pantoprazole    Tablet 40 milliGRAM(s) Oral before breakfast  QUEtiapine 200 milliGRAM(s) Oral two times a day    MEDICATIONS  (PRN):  ALBUTerol    90 MICROgram(s) HFA Inhaler 2 Puff(s) Inhalation every 6 hours PRN Shortness of Breath and/or Wheezing  chlorproMAZINE    Injectable 50 milliGRAM(s) IntraMuscular every 6 hours PRN agitation  cyclobenzaprine 5 milliGRAM(s) Oral three times a day PRN Muscle Spasm  diphenhydrAMINE Injectable 25 milliGRAM(s) IV Push every 6 hours PRN Sleep or agitation  LORazepam   Injectable 2 milliGRAM(s) IV Push every 4 hours PRN Agitation    Allergies:  latex (Blisters)  morphine (Unknown)  penicillin (Hives)  penicillin (Other)  pineapple (Unknown)  Toradol (Rash)  Toradol (Unknown)  Zofran (Hives)  Zofran (Unknown)    Intolerances:      REVIEW OF SYSTEMS: As indicated above; otherwise, negative    VITAL SIGNS:  T(F): 98.1 (04-04-22 @ 07:28), Max: 98.6 (04-03-22 @ 14:40)  HR: 87 (04-04-22 @ 07:28) (87 - 97)  BP: 115/73 (04-04-22 @ 07:28) (101/63 - 115/73)  RR: 17 (04-04-22 @ 07:28) (17 - 18)  SpO2: 100% (04-04-22 @ 07:28) (98% - 100%)    PHYSICAL EXAM:  General: NAD, well-groomed, well-developed  Eyes: Sclera clear  Chest: Clear to auscultation bilaterally; no rales, rhonchi, or wheezing  Heart: Regular rate and rhythm; normal S1 and S2; no murmurs, rubs, or gallops  Abd: Soft, nontender, nondistended  Nervous System: AAOX3  Psych: Appropriate affect  Ext: no peripheral LE edema bilaterally    LABS:          CAPILLARY BLOOD GLUCOSE      POCT Blood Glucose.: 81 mg/dL (04 Apr 2022 01:21)    BLOOD CULTURE  04-01 @ 17:05   <10,000 CFU/mL Normal Urogenital Samira  --  --    RADIOLOGY & ADDITIONAL TESTS:    Imaging Personally Reviewed:  [X ] YES     Consultant(s) Notes Reviewed:  Yes    Care Discussed with Consultants/Other Providers: Yes yes

## 2022-04-04 NOTE — BH CONSULTATION LIAISON PROGRESS NOTE - NSBHASSESSMENTFT_PSY_ALL_CORE
35yo single, disabled, female domiciled in [facility] home, PMHx Factor V Leiden deficiency, DVT/PE, seizure d/o, asthma, endometriosis, intellectual disability, vitamine D deficiency, chronic ischemic heart disease, IBS, GERD, PID, headache syndrome, hypothyroidism, as per chart: PPHx extensive psychiatric (schizoaffective vs. bipolar, personality d/o, mood d/o), outpatient care at the [facility], multiple inpatient psychiatric admissions most recent [place] [employer] 7/20-8/21, hx crack/cocaine use dos, prior legal issues (weapons possession, harassment charges), two prior SA (overdose 2017), hx SIB, presents to [facility] ED with subjective b/l LE weakness.    Patient likely at baseline. She was calm and cooperative during this AMs interview. She denies SI/HI. She has an interpersonal conflict with a group home employee but does not seem to want to hurt her/him. Defer to group home with how to address this. Assuming group home is willing to take her back, there is no psychiatric contraindication to discharge. While she has a poor frustration tolerance, being back in her group home is likely best situation for her.      PLAN:  -c/w 1:1 observation given aggression, impulsivity, risk for elopement-expressing suicidal ideation without plan or intent per primary team  -clarify and c/w home medications: Latuda 40mg, Abilify 30mg; Increase Seroquel to 200mg BID** (HOLD for sedation and monitor pulse ox closely)  -AGITATION: PRN Thorazine 50mg PO/IM q6h + Ativan 2mg po/iv/im q4h PRN  - Monitor EKG qtc interval-hold psychotropics if qtc >500  - low threshold for PRN use/ restraints/calling security given hx of aggressive behavior  - NO LOOSE or SHARP  objects within patient's reach.   - NO METALS utensils.   -FAST discharge back to residence once medically/psychiatrically  cleared  - Patient has a legal guardian Maria Elena Brown [phone]; Patient CANNOT refuse transfer back to group home when medically stable  - Patient CANNOT leave AMA-requires psychiatric clearance for discharge 37yo single, disabled, female domiciled in [facility], PMHx Factor V Leiden deficiency, DVT/PE, seizure d/o, asthma, endometriosis, intellectual disability, vitamine D deficiency, chronic ischemic heart disease, IBS, GERD, PID, headache syndrome, hypothyroidism, as per chart: PPHx extensive psychiatric (schizoaffective vs. bipolar, personality d/o, mood d/o), outpatient care at the [facility], multiple inpatient psychiatric admissions most recent [place] 7/20-8/21, hx crack/cocaine use dos, prior legal issues (weapons possession, harassment charges), two prior SA (overdose 2017), hx SIB, presents to [facility] ED with subjective b/l LE weakness.    Patient likely at baseline. She was calm and cooperative during this AMs interview. She denies SI/HI. She has an interpersonal conflict with a group home employee but does not seem to want to hurt her/him. Defer to group home with how to address this. Assuming group home is willing to take her back, there is no psychiatric contraindication to discharge. While she has a poor frustration tolerance, being back in her group home is likely best situation for her.      PLAN:  -c/w 1:1 observation given aggression, impulsivity, risk for elopement-expressing suicidal ideation without plan or intent per primary team  -clarify and c/w home medications: Latuda 40mg, Abilify 30mg; Increase Seroquel to 200mg BID** (HOLD for sedation and monitor pulse ox closely)  -AGITATION:   	PRN- can first try PO seroquel 50mg q8h prn before using IMs  	PRN Thorazine 50mg PO/IM q6h + Ativan 2mg po/iv/im q4h PRN  - Monitor EKG qtc interval-hold psychotropics if qtc >500  - low threshold for PRN use/ restraints/calling security given hx of aggressive behavior  - NO LOOSE or SHARP  objects within patient's reach.   - NO METALS utensils.   -FAST discharge back to residence once medically/psychiatrically  cleared  - Patient has a legal guardian Maria Elena Brown [phone]; Patient CANNOT refuse transfer back to group home when medically stable  - Patient CANNOT leave AMA-requires psychiatric clearance for discharge 35yo single, disabled, female domiciled in [facility], PMHx Factor V Leiden deficiency, DVT/PE, seizure d/o, asthma, endometriosis, intellectual disability, vitamine D deficiency, chronic ischemic heart disease, IBS, GERD, PID, headache syndrome, hypothyroidism, as per chart: PPHx extensive psychiatric (schizoaffective vs. bipolar, personality d/o, mood d/o), outpatient care at the [facility], multiple inpatient psychiatric admissions most recent [place] 7/20-8/21, hx crack/cocaine use dos, prior legal issues (weapons possession, harassment charges), two prior SA (overdose 2017), hx SIB, presents to [facility] ED with subjective b/l LE weakness.    Patient likely at baseline. She was calm and cooperative during this AMs interview. She denies SI/HI. She has an interpersonal conflict with a group home employee but does not seem to want to hurt her/him. Defer to group home with how to address this. Assuming group home is willing to take her back, there is no psychiatric contraindication to discharge. While she has a poor frustration tolerance, being back in her group home is likely best situation for her.      PLAN:  -c/w 1:1 observation given aggression, impulsivity, risk for elopement-expressing suicidal ideation without plan or intent per primary team  -clarify and c/w home medications: Latuda 40mg, Abilify 30mg; VPA  - Addendum: after speaking with primary hospitalist. regarding seroquel- can increase to 200mg @ 8am, 50mg @ 12pm, 200mg @ 8pm (monitor and hold for hypotension, excessive sedation)  -AGITATION:   	PRN- can first try PO seroquel 50mg q8h prn before using IMs  	PRN Thorazine 50mg PO/IM q6h + Ativan 2mg po/iv/im q4h PRN  - Monitor EKG qtc interval-hold psychotropics if qtc >500  - low threshold for PRN use/ restraints/calling security given hx of aggressive behavior  - NO LOOSE or SHARP  objects within patient's reach.   - NO METALS utensils.   -FAST discharge back to residence once medically/psychiatrically  cleared  - Patient has a legal guardian Maria Elena Brown [phone]; Patient CANNOT refuse transfer back to group home when medically stable  - Patient CANNOT leave AMA-requires psychiatric clearance for discharge

## 2022-04-04 NOTE — PROGRESS NOTE ADULT - PROBLEM SELECTOR PLAN 3
Seizure precautions.  Continue VPA 1500 mg po QHS.  F/U VPA level.   F/U TSH, A1C 3/25/22; bilateral LE US: Deep venous thrombosis in the left popliteal vein, at the knee.  Continue Eliquis 5 mg po BID.  Monitor CBC  -patient declined US imaging of her extremities B/L LE weakness  CT head: No evidence of acute transcortical infarct, acute intracranial hemorrhage, or mass effect.  -patient ambulates without assistance  Seen by neurology.  F/U PT, OT.  CRISTINA horton appreciated   Fall precautions.

## 2022-04-04 NOTE — DISCHARGE NOTE PROVIDER - NSDCFUADDAPPT_GEN_ALL_CORE_FT
1930 Patient yelling out, upon arrival to patient room he is resting in bed. Patient yelling out talking to his mother. Patient reoriented, he denies hearing voices at first but then states his mother is in the room with him trying to inject into his arm. Patient points to left arm to area of previous lab draw. Patient follows commands and easily calmed. Patient was given Zyprexa and Atarax prior to shift start. Will continue to monitor with nursing rounds and q 15 minute checks. 2300 Patient yelling out \"help, help\". Upon entering room patient standing in doorway stating \"that PO was going to jump me and shoot me\". Patient reoriented to location but continues to be having hallucinations at this time of being in intermediate and \"being punished\" per patient statement. Nurse walked with patient to dayroom to provide open area to decrease anxiety. Patient medicated with Zyprex and Atarax. Patient easily calmed and reassured by nurse and BHT. Will continue to monitor. 0211 Patient screaming and yelling in room. Patient in and out of room yelling. Upon nurse arrival to room patient states \"they just tried to shoot me, my mama saw it too\" patient was pointing at wall. Reoriented patient to location, time and situation. 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 Patient medicated with Geodon per physician order of prn medication. 0636 Patient resting quietly, no disruptive outbursts since Geodon administered. Nursing judgement not to wake patient for HgbA1c lab draw after patient is finally resting, but will attempt if he wakes before end of shift.    0801 Patient has slept approximately 4-5 hours. SE[facility]UN[facility]  [address] [place] [id number] [phone]

## 2022-04-04 NOTE — DISCHARGE NOTE PROVIDER - NSFOLLOWUPCLINICS_GEN_ALL_ED_FT
[facility]  Psychiatry  [address] [place] [address]  Phone: [phone]  Fax:

## 2022-04-04 NOTE — PROGRESS NOTE ADULT - PROBLEM SELECTOR PLAN 5
Continue Eliquis 5 mg po BID. Currently stable at this time.  Not on any hormones. Seizure precautions.  Continue Depakote.  F/U TSH, A1C

## 2022-04-04 NOTE — PROGRESS NOTE ADULT - PROBLEM SELECTOR PLAN 6
VTE covered with Eliquis.  Fall, Aspiration, safety and seizure precautions.   PT, OT, neuro eval Continue Eliquis 5 mg po BID. Currently stable at this time.  Not on any hormones.

## 2022-04-04 NOTE — DISCHARGE NOTE PROVIDER - HOSPITAL COURSE
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 bipolar, personality d/o) presents with subjective b/l LE weakness. Pt had been admitted at [facility] from 3/25-3/31/22 and required a stay in the ICU for possible status epilepticus vs. pseudoseizures and severe agitation requiring Precedex gtt. There was plan to transfer pt to EMU at [facility], but transfer was canceled after pt had multiple Code Gray's called on her due to severe agitation, aggression, and outbursts. Pt was instead discharged back to her group home on Thursday after being deemed clinically stable. Per documentation from Critical Care attending at [facility] on 3/31/22:  "She initially refused to be discharged this morning stating she 'can't walk and her legs are too weak' however throughout her hospital course she has been able to kick or attempt to kick her legs without difficulty and swing them off the bed and she was witnessed to stand and take steps to go to the commode by nursing staff and able to stand up from bed during her periods of agitation."    During her hospitalization, pt was diagnosed with a L popliteal vein DVT while on Xarelto and was, therefore, switched to Eliquis. Pt states that she has been feeling "wobbly" since she woke up on Friday morning. Otherwise, unable to elicit more history from pt, as pt is a poor historian and currently sedated.     ED Course: Vitals: Temp = 97.8*, HR = 88b/ min, BP = 104/ 75, RR= 18b/ min, SPO2= 100% room air    Hospital Course: Patient was afebrile and hemodynamically stable. Neurology for the bilateral LE weakness. 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 bipolar, personality d/o) presents with subjective b/l LE weakness. Pt had been admitted at [facility] from 3/25-3/31/22 and required a stay in the ICU for possible status epilepticus vs. pseudoseizures and severe agitation requiring Precedex gtt. There was plan to transfer pt to EMU at [facility], but transfer was canceled after pt had multiple Code Gray's called on her due to severe agitation, aggression, and outbursts. Pt was instead discharged back to her group home on Thursday after being deemed clinically stable. Per documentation from Critical Care attending at [facility] on 3/31/22:  "She initially refused to be discharged this morning stating she 'can't walk and her legs are too weak' however throughout her hospital course she has been able to kick or attempt to kick her legs without difficulty and swing them off the bed and she was witnessed to stand and take steps to go to the commode by nursing staff and able to stand up from bed during her periods of agitation."    During her hospitalization, pt was diagnosed with a L popliteal vein DVT while on Xarelto and was, therefore, switched to Eliquis. Pt states that she has been feeling "wobbly" since she woke up on Friday morning. Otherwise, unable to elicit more history from pt, as pt is a poor historian and currently sedated.     ED Course: Vitals: Temp = 97.8*, HR = 88b/ min, BP = 104/ 75, RR= 18b/ min, SPO2= 100% room air    Hospital Course: Patient was afebrile and hemodynamically stable. Neurology for the bilateral LE weakness.    extensive psychiatric (schizoaffective vs. bipolar, personality d/o, mood d/o), outpatient care at the [facility], multiple inpatient psychiatric admissions most recent [place] 7/20-8/21, hx crack/cocaine use dos, prior legal issues (weapons possession, harassment charges), two prior SA (overdose 2017), hx SIB, presents to [facility] ED with subjective b/l LE weakness.   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 bipolar, personality d/o) presents with subjective b/l LE weakness. Pt had been admitted at [facility] from 3/25-3/31/22 and required a stay in the ICU for possible status epilepticus vs. pseudoseizures and severe agitation requiring Precedex gtt. There was plan to transfer pt to EMU at [facility], but transfer was canceled after pt had multiple Code Gray's called on her due to severe agitation, aggression, and outbursts. Pt was instead discharged back to her group home on Thursday after being deemed clinically stable. Per documentation from Critical Care attending at [facility] on 3/31/22:  "She initially refused to be discharged this morning stating she 'can't walk and her legs are too weak' however throughout her hospital course she has been able to kick or attempt to kick her legs without difficulty and swing them off the bed and she was witnessed to stand and take steps to go to the commode by nursing staff and able to stand up from bed during her periods of agitation."    During her hospitalization, pt was diagnosed with a L popliteal vein DVT while on Xarelto and was, therefore, switched to Eliquis. Pt states that she has been feeling "wobbly" since she woke up on Friday morning. Otherwise, unable to elicit more history from pt, as pt is a poor historian and currently sedated.     ED Course: Vitals: Temp = 97.8*, HR = 88b/ min, BP = 104/ 75, RR= 18b/ min, SPO2= 100% room air    Hospital Course: Patient was afebrile and hemodynamically stable. Neurology was consulted for the bilateral LE weakness. Based on neurology team's assessment, patient had no focal deficits     extensive psychiatric (schizoaffective vs. bipolar, personality d/o, mood d/o), outpatient care at the [facility], multiple inpatient psychiatric admissions most recent [place] 7/20-8/21, hx crack/cocaine use dos, prior legal issues (weapons possession, harassment charges), two prior SA (overdose 2017), hx SIB, presents to [facility] ED with subjective b/l LE weakness.   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 bipolar, personality d/o) presents with subjective b/l LE weakness. Pt had been admitted at [facility] from 3/25-3/31/22 and required a stay in the ICU for possible status epilepticus vs. pseudoseizures and severe agitation requiring Precedex gtt. There was plan to transfer pt to EMU at [facility], but transfer was canceled after pt had multiple Code Gray's called on her due to severe agitation, aggression, and outbursts. Pt was instead discharged back to her group home on Thursday after being deemed clinically stable. Per documentation from Critical Care attending at [facility] on 3/31/22:  "She initially refused to be discharged this morning stating she 'can't walk and her legs are too weak' however throughout her hospital course she has been able to kick or attempt to kick her legs without difficulty and swing them off the bed and she was witnessed to stand and take steps to go to the commode by nursing staff and able to stand up from bed during her periods of agitation."    During her hospitalization, pt was diagnosed with a L popliteal vein DVT while on Xarelto and was, therefore, switched to Eliquis. Pt states that she has been feeling "wobbly" since she woke up on Friday morning. Otherwise, unable to elicit more history from pt, as pt is a poor historian and currently sedated.     ED Course: Vitals: Temp = 97.8F, HR = 88b/min, BP = 104/ 75, RR= 18b/ min, SPO2= 100% room air    Hospital Course: Patient was afebrile and hemodynamically stable. Neurology was consulted for the bilateral LE weakness. Based on neurology team's assessment, LE weakness possibly pain limited. No clear organic neurological etiology at this time. Unable to explain isolated RUE numbness at this time. Low suspicion for stroke at this time. Course complicated by multiple code kevin for aggression and violence towards staff requiring 4-point restraints multiple times and sedation. Psychiatrists were consulted and recommendations appreciated for psychotropic medication optimization (Ativan, Thorazine, Seroquel, Abilify, Latuda). 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 bipolar, personality d/o) presents with subjective b/l LE weakness. Pt had been admitted at [facility] from 3/25-3/31/22 and required a stay in the ICU for possible status epilepticus vs. pseudoseizures and severe agitation requiring Precedex gtt. There was plan to transfer pt to EMU at [facility], but transfer was canceled after pt had multiple Code Gray's called on her due to severe agitation, aggression, and outbursts. Pt was instead discharged back to her group home on Thursday after being deemed clinically stable. Per documentation from Critical Care attending at [facility] on 3/31/22:  "She initially refused to be discharged this morning stating she 'can't walk and her legs are too weak' however throughout her hospital course she has been able to kick or attempt to kick her legs without difficulty and swing them off the bed and she was witnessed to stand and take steps to go to the commode by nursing staff and able to stand up from bed during her periods of agitation."    During her hospitalization, pt was diagnosed with a L popliteal vein DVT while on Xarelto and was, therefore, switched to Eliquis. Pt states that she has been feeling "wobbly" since she woke up on Friday morning. Otherwise, unable to elicit more history from pt, as pt is a poor historian and currently sedated.     ED Course: Vitals: Temp = 97.8F, HR = 88b/min, BP = 104/ 75, RR= 18b/ min, SPO2= 100% room air    Hospital Course: Patient was afebrile and hemodynamically stable. Neurology was consulted for the bilateral LE weakness. Based on neurology team's assessment, LE weakness possibly pain limited. No clear organic neurological etiology at this time. Unable to explain isolated RUE numbness at this time. Low suspicion for stroke at this time.   Patient already      Course complicated by multiple code kevin for aggression and violence towards staff requiring 4-point restraints multiple times and sedation. Psychiatrists were consulted and recommendations appreciated for psychotropic medication optimization (Ativan, Thorazine, Seroquel, Abilify, Latuda). 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 bipolar, personality d/o) presents with subjective b/l LE weakness. Pt had been admitted at [facility] from 3/25-3/31/22 and required a stay in the ICU for possible status epilepticus vs. pseudoseizures and severe agitation requiring Precedex gtt. There was plan to transfer pt to EMU at [facility], but transfer was canceled after pt had multiple Code Gray's called on her due to severe agitation, aggression, and outbursts. Pt was instead discharged back to her group home on Thursday after being deemed clinically stable. Per documentation from Critical Care attending at [facility] on 3/31/22:  "She initially refused to be discharged this morning stating she 'can't walk and her legs are too weak' however throughout her hospital course she has been able to kick or attempt to kick her legs without difficulty and swing them off the bed and she was witnessed to stand and take steps to go to the commode by nursing staff and able to stand up from bed during her periods of agitation."    During her hospitalization, pt was diagnosed with a L popliteal vein DVT while on Xarelto and was, therefore, switched to Eliquis. Pt states that she has been feeling "wobbly" since she woke up on Friday morning. Otherwise, unable to elicit more history from pt, as pt is a poor historian and currently sedated.     ED Course: Vitals: Temp = 97.8F, HR = 88b/min, BP = 104/ 75, RR= 18b/ min, SPO2= 100% room air    Hospital Course: Patient was afebrile and hemodynamically stable. Neurology was consulted for the bilateral LE weakness. Based on neurology team's assessment, LE weakness possibly pain limited. No clear organic neurological etiology at this time. Unable to explain isolated RUE numbness at this time. Low suspicion for stroke at this time.   Patient already on anticoagulation and refused DVT studies multiple times.     Course complicated by multiple code kevin for aggression and violence towards staff requiring 4-point restraints multiple times and sedation. Psychiatrists were consulted and recommendations appreciated for psychotropic medication optimization (Ativan, Thorazine, Seroquel, Abilify, Latuda).    Patient has been off restraints for over 24 hours. Has not required any IV psychotropic medications or PRNs.   Patient's discharged reconciliation: Lorazepam PO 0.5mg bid, thorazine 50mg q6h PRN, benadryl PO 25mg PO q6h, Tizanidine 2mg bid PRN, Quetiapine 200mg AM, 50mg afternoon, and 200 mg PM, Depakote 500mg AM, 1000mg bedtime, abilify 30mg daily, lurasidone 40 daily, Levothyroxine 75mg daily, Pantoprazole 40 mg AM     Psychiatry has cleared patient for discharge. 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 bipolar, personality d/o) presents with subjective b/l LE weakness. Pt had been admitted at [facility] from 3/25-3/31/22 and required a stay in the ICU for possible status epilepticus vs. pseudoseizures and severe agitation requiring Precedex gtt. There was plan to transfer pt to EMU at [facility], but transfer was canceled after pt had multiple Code Gray's called on her due to severe agitation, aggression, and outbursts. Pt was instead discharged back to her group home on Thursday after being deemed clinically stable. Per documentation from Critical Care attending at [facility] on 3/31/22:  "She initially refused to be discharged this morning stating she 'can't walk and her legs are too weak' however throughout her hospital course she has been able to kick or attempt to kick her legs without difficulty and swing them off the bed and she was witnessed to stand and take steps to go to the commode by nursing staff and able to stand up from bed during her periods of agitation."    During her hospitalization, pt was diagnosed with a L popliteal vein DVT while on Xarelto and was, therefore, switched to Eliquis. Pt states that she has been feeling "wobbly" since she woke up on Friday morning. Otherwise, unable to elicit more history from pt, as pt is a poor historian and currently sedated.     ED Course: Vitals: Temp = 97.8F, HR = 88b/min, BP = 104/ 75, RR= 18b/ min, SPO2= 100% room air    Hospital Course: Patient was afebrile and hemodynamically stable. Neurology was consulted for the bilateral LE weakness. Based on neurology team's assessment, LE weakness possibly pain limited. No clear organic neurological etiology at this time. Unable to explain isolated RUE numbness at this time. Low suspicion for stroke at this time.   Patient already on anticoagulation and refused DVT studies multiple times.     Course complicated by multiple code kevin for aggression and violence towards staff requiring 4-point restraints multiple times and sedation. Psychiatrists were consulted and recommendations appreciated for psychotropic medication optimization (Ativan, Thorazine, Seroquel, Abilify, Latuda).    Patient has been off restraints for over 24 hours. Has not required any IV psychotropic medications or PRNs.   Patient's discharged reconciliation: Lorazepam PO 0.5mg bid, thorazine 50mg q6h PRN, benadryl PO 25mg PO q6h, Tizanidine 2mg bid PRN, Quetiapine 200mg AM, 50mg afternoon, and 200 mg PM, Depakote 500mg AM, 1000mg bedtime, abilify 30mg daily, lurasidone 40 daily, Levothyroxine 75mg daily, Pantoprazole 40 mg AM     Psychiatry has cleared patient for discharge.       Attending addendum: Spoke with staff - Mr Landrum from [facility]. Reviewed plan and meds. Advised him that meds were e-sent to pharmacy. Confirmed that pt should continue on current discharge regimen as listed below. This regimen was reviewed in conjunction w/ Dr Loving psych during evaluation. There is no need to continue haldol. Refer to med list for detail.

## 2022-04-04 NOTE — CHART NOTE - NSCHARTNOTEFT_GEN_A_CORE
Patient was complaining of itchiness of the R leg. Stating that benadryl 25mg was not enough. Ordered hydrocortisone cream for pt but pt states that she did not want it. Continued to complain that she could not sleep and requested an additional dose of benadryl 25mg for sleep. Placed an extra 25 as pt states 50mg works for her. Patient also requested extra ativan, but did not offer because she was not due for another PRN dose.    Carmine walker was called as patient was agitated and throwing things, cursing at staff. Saw patient at bedside with security present. Patient was sitting at the bedside and states that she was having auras due to being agitated. She then began to slouch forward and was not responsive to verbal stimuli. Laid patient down on bed and she began to have seizure-like activity. Patient had small amplitude central body shaking, extremities were flaccid. Initially trialed pushing normal saline but seizure-like activity continued. Ordered 2mg ativan IVP which helped stop seizure-like activity. Patient woke up after a few minutes. Patient was complaining of itchiness of the R leg. Stating that benadryl 25mg was not enough. Ordered hydrocortisone cream for pt but pt states that she did not want it. Continued to complain that she could not sleep and requested an additional dose of benadryl 25mg for sleep. Placed an extra 25 as pt states 50mg works for her. Patient also requested extra ativan, but did not offer because she was not due for another PRN dose.    Carmine walker was called as patient was agitated and throwing things, cursing at staff. Saw patient at bedside with security present. Patient was sitting at the bedside and states that she was having auras due to being agitated. She then began to slouch forward and was not responsive to verbal stimuli. Laid patient down on bed and she began to have seizure-like activity. Patient had small amplitude central body shaking, extremities were flaccid. Initially trialed pushing normal saline but seizure-like activity continued. Ordered 2mg ativan IVP which helped stop seizure-like activity. Patient woke up after a few minutes.    Patient is also now endorsing SI. Continued on 1 to 1. Patient was complaining of itchiness of the R leg. Stating that benadryl 25mg was not enough. Ordered hydrocortisone cream for pt but pt states that she did not want it. Continued to complain that she could not sleep and requested an additional dose of benadryl 25mg for sleep. Placed an extra 25 as pt states 50mg works for her. Patient also requested extra ativan, but did not offer because she was not due for another PRN dose.    Carmine walker was called as patient was agitated and throwing things, cursing at staff. Saw patient at bedside with security present. Patient was sitting at the bedside and states that she was having auras due to being agitated. She then began to slouch forward and was not responsive to verbal stimuli. Laid patient down on bed and she began to have seizure-like activity. Patient had small amplitude central body shaking, extremities were flaccid. Initially trialed pushing normal saline but seizure-like activity continued. Ordered 2mg ativan IVP which helped stop seizure-like activity. Patient woke up after a few minutes. Fingerstick 81. Rectal temp 100.2    Patient is also now endorsing SI. Continued on 1 to 1. Patient was complaining of itchiness of the R leg. Stating that benadryl 25mg was not enough. Ordered hydrocortisone cream for pt but pt states that she did not want it. Continued to complain that she could not sleep and requested an additional dose of benadryl 25mg for sleep. Placed an extra 25 as pt states 50mg works for her. Patient also requested extra ativan, but did not offer because she was not due for another PRN dose.    Carmine walker was called as patient was agitated and throwing things, cursing at staff. Saw patient at bedside with security present. Patient was sitting at the bedside and states that she was having auras due to being agitated. She then began to slouch forward and was not responsive to verbal stimuli. Laid patient down on bed and she began to have seizure-like activity. Patient had small amplitude central body shaking, extremities were flaccid. Initially trialed pushing normal saline but seizure-like activity continued. Ordered 2mg ativan IVP which helped stop seizure-like activity. Patient woke up after a few minutes. Fingerstick 81. Rectal temp 100.2    Patient is also now endorsing SI. Continued on 1 to 1.    3AM contacted by nurse saying patient becoming aggressive, requiring 4 point restraints. Patient was complaining of itchiness of the R leg. Stating that benadryl 25mg was not enough. Ordered hydrocortisone cream for pt but pt states that she did not want it. Continued to complain that she could not sleep and requested an additional dose of benadryl 25mg for sleep. Placed an extra 25 as pt states 50mg works for her. Patient also requested extra ativan, but did not offer because she was not due for another PRN dose.    Carmine walker was called as patient was agitated and throwing things, cursing at staff. Saw patient at bedside with security present. Patient was sitting at the bedside and states that she was having auras due to being agitated. She then began to slouch forward and was not responsive to verbal stimuli. Laid patient down on bed and she began to have seizure-like activity. Patient had small amplitude central body shaking, extremities were flaccid. Initially trialed pushing normal saline but seizure-like activity continued. Ordered 2mg ativan IVP which helped stop seizure-like activity. Patient woke up after a few minutes. Fingerstick 81. Rectal temp 100.2    Patient is also now endorsing SI. Continued on 1 to 1.    3AM contacted by nurse saying patient becoming aggressive, requiring 4 point restraints.    3:30AM: Contacted by nurse manager stating that patient is threatening to kill staff, becoming violent. Ordered haldol 1mg IVP. Patient to receive ativan 2mg IVP PRN.

## 2022-04-04 NOTE — DISCHARGE NOTE PROVIDER - CARE PROVIDER_API CALL
DEVAN DOLAN  Internal Medicine  N  MAGDALENO HARTMAN, Phys,    Phone: [phone]  Fax: [phone]  Follow Up Time:

## 2022-04-04 NOTE — DISCHARGE NOTE PROVIDER - NSDCMRMEDTOKEN_GEN_ALL_CORE_FT
albuterol 90 mcg/inh inhalation aerosol: 2 puff(s) inhaled every 6 hours, As needed, Shortness of Breath and/or Wheezing  ARIPiprazole 30 mg oral tablet: 1 tab(s) orally once a day  Depakote  mg oral tablet, extended release: 3 tab(s) orally once a day (at bedtime)   diphenhydrAMINE 25 mg oral capsule: 1 cap(s) orally every 6 hours, As needed, Rash and/or Itching  Eliquis 5 mg oral tablet: 1 tab(s) orally 2 times a day   ferrous sulfate 325 mg (65 mg elemental iron) oral tablet: 1 tab(s) orally once a day  lacosamide 200 mg/20 mL intravenous solution: 10 milliliter(s) intravenous every 12 hours  levothyroxine 75 mcg (0.075 mg) oral tablet: 1 tab(s) orally once a day  lurasidone 40 mg oral tablet: 1 tab(s) orally once a day  mometasone 220 mcg/inh inhalation aerosol powder: 1 puff(s) inhaled once a day  pantoprazole 40 mg intravenous injection: 40 milligram(s) intravenous once a day  QUEtiapine 50 mg oral tablet: 1 tab(s) orally 2 times a day   albuterol 90 mcg/inh inhalation aerosol: 2 puff(s) inhaled every 6 hours, As needed, Shortness of Breath and/or Wheezing  ARIPiprazole 30 mg oral tablet: 1 tab(s) orally once a day  Claritin 10 mg oral tablet: 1 tab(s) orally once a day  Depakote  mg oral tablet, extended release: 3 tab(s) orally once a day (at bedtime)   diphenhydrAMINE 25 mg oral capsule: 1 cap(s) orally every 6 hours, As needed, Rash and/or Itching  Eliquis 5 mg oral tablet: 1 tab(s) orally 2 times a day   Flovent 110 mcg/inh inhalation aerosol with adapter:   fluticasone 50 mcg/inh nasal spray: 1 spray(s) nasal 2 times a day  Haldol 5 mg oral tablet: 1 tab(s) orally 2 times a day  levothyroxine 75 mcg (0.075 mg) oral tablet: 1 tab(s) orally once a day  mometasone 220 mcg/inh inhalation aerosol powder: 1 puff(s) inhaled once a day  Multiple Vitamins with Iron oral tablet: 1 tab(s) orally once a day  olopatadine 0.1% ophthalmic solution: 1 drop(s) to each affected eye every 6 hours, As Needed  QUEtiapine 50 mg oral tablet: 1 tab(s) orally 2 times a day  ramelteon 8 mg oral tablet: 1 tab(s) orally once a day (at bedtime)  Tylenol 325 mg oral tablet: 2 tab(s) orally every 6 hours, As Needed  Vicks 44 Cold, Flu and Cough: 30 milliliter(s) orally every 6 hours, As Needed  Vitamin D2 1.25 mg (50,000 intl units) oral capsule: 1 cap(s) orally once a week  ZyrTEC 10 mg oral tablet: 1 tab(s) orally once a day   Ala-Maximo 1% topical cream: 1 application topically once  albuterol 90 mcg/inh inhalation aerosol: 2 puff(s) inhaled every 6 hours, As needed, Shortness of Breath and/or Wheezing  ARIPiprazole 30 mg oral tablet: 1 tab(s) orally once a day  chlorproMAZINE 50 mg oral tablet: 1 tab(s) orally every 6 hours, As needed, agitation/aggression  Claritin 10 mg oral tablet: 1 tab(s) orally once a day  Depakote  mg oral tablet, extended release: 3 tab(s) orally once a day 500 mg in AM and 1000mg bedtime  diphenhydrAMINE 25 mg oral capsule: 1 cap(s) orally every 6 hours, As needed, Rash and/or Itching  Eliquis 5 mg oral tablet: 1 tab(s) orally 2 times a day   Flovent 110 mcg/inh inhalation aerosol with adapter:   fluticasone 50 mcg/inh nasal spray: 1 spray(s) nasal 2 times a day  levothyroxine 75 mcg (0.075 mg) oral tablet: 1 tab(s) orally once a day  lurasidone 40 mg oral tablet: 1 tab(s) orally once a day  melatonin 3 mg oral tablet: 2 tab(s) orally once a day (at bedtime)  mometasone 220 mcg/inh inhalation aerosol powder: 1 puff(s) inhaled once a day  Multiple Vitamins with Iron oral tablet: 1 tab(s) orally once a day  olopatadine 0.1% ophthalmic solution: 1 drop(s) to each affected eye every 6 hours, As Needed  pantoprazole 40 mg oral delayed release tablet: 1 tab(s) orally once a day (before a meal)  QUEtiapine 50 mg oral tablet: 1 tab(s) orally once a day at noon  SEROquel 200 mg oral tablet: 1 tab(s) orally 2 times a day   tiZANidine 2 mg oral tablet: 1 tab(s) orally 2 times a day, As Needed -Muscle Spasm    Tylenol 325 mg oral tablet: 2 tab(s) orally every 6 hours, As Needed  Vitamin D2 1.25 mg (50,000 intl units) oral capsule: 1 cap(s) orally once a week   Ala-Maximo 1% topical cream: 1 application topically once  albuterol 90 mcg/inh inhalation aerosol: 2 puff(s) inhaled every 6 hours, As needed, Shortness of Breath and/or Wheezing  ARIPiprazole 30 mg oral tablet: 1 tab(s) orally once a day  Ativan 0.5 mg oral tablet: 1 tab(s) orally 2 times a day  [10AM and 4PM] MDD:1mg  chlorproMAZINE 50 mg oral tablet: 1 tab(s) orally every 6 hours, As needed, agitation/aggression  Claritin 10 mg oral tablet: 1 tab(s) orally once a day  Depakote  mg oral tablet, extended release: 3 tab(s) orally once a day 500 mg in AM and 1000mg bedtime  diphenhydrAMINE 25 mg oral capsule: 1 cap(s) orally every 6 hours, As needed, Rash and/or Itching  Eliquis 5 mg oral tablet: 1 tab(s) orally 2 times a day   Flovent 110 mcg/inh inhalation aerosol with adapter:   fluticasone 50 mcg/inh nasal spray: 1 spray(s) nasal 2 times a day  levothyroxine 75 mcg (0.075 mg) oral tablet: 1 tab(s) orally once a day  lurasidone 40 mg oral tablet: 1 tab(s) orally once a day  melatonin 3 mg oral tablet: 2 tab(s) orally once a day (at bedtime)  mometasone 220 mcg/inh inhalation aerosol powder: 1 puff(s) inhaled once a day  Multiple Vitamins with Iron oral tablet: 1 tab(s) orally once a day  olopatadine 0.1% ophthalmic solution: 1 drop(s) to each affected eye every 6 hours, As Needed  pantoprazole 40 mg oral delayed release tablet: 1 tab(s) orally once a day (before a meal)  QUEtiapine 50 mg oral tablet: 1 tab(s) orally once a day at noon  SEROquel 200 mg oral tablet: 1 tab(s) orally 2 times a day [8AM and 8PM]  tiZANidine 2 mg oral tablet: 1 tab(s) orally 2 times a day, As Needed -Muscle Spasm    Tylenol 325 mg oral tablet: 2 tab(s) orally every 6 hours, As Needed  Vitamin D2 1.25 mg (50,000 intl units) oral capsule: 1 cap(s) orally once a week

## 2022-04-04 NOTE — PROGRESS NOTE ADULT - PROBLEM SELECTOR PLAN 4
Currently stable at this time.  Not on any hormones. Seizure precautions.  Continue Depakote.  F/U TSH, A1C 3/25/22; bilateral LE US: Deep venous thrombosis in the left popliteal vein, at the knee.  Continue Eliquis 5 mg po BID.  Monitor CBC  -patient declined US imaging of her extremities

## 2022-04-04 NOTE — DISCHARGE NOTE PROVIDER - NSDCCPCAREPLAN_GEN_ALL_CORE_FT
PRINCIPAL DISCHARGE DIAGNOSIS  Diagnosis: Agitation  Assessment and Plan of Treatment: During your hospital stay you experienced multiple bouts of agitation. psychiatry evaluated you for this and recommended: Lorazepam PO 0.5mg bid, thorazine 50mg q6h PRN, benadryl PO 25mg PO q6h, Tizanidine 2mg bid PRN, Quetiapine 200mg AM, 50mg afternoon, and 200 mg PM, Depakote 500mg AM, 1000mg bedtime, abilify 30mg daily, lurasidone 40 daily  Please continue to take these medication. If you experience bouts of agitation, PLEASE go to psychiatry emergency room NOT medicine ED        SECONDARY DISCHARGE DIAGNOSES  Diagnosis: Lower extremity weakness  Assessment and Plan of Treatment: You presented to ED with leg weakness which has since resolved. Please follw up with your primary care doctor regarding leg weakness if you experience it again    Diagnosis: Agitation  Assessment and Plan of Treatment:      PRINCIPAL DISCHARGE DIAGNOSIS  Diagnosis: Agitation  Assessment and Plan of Treatment: During your hospital stay you experienced multiple bouts of agitation. psychiatry evaluated you for this and recommended: Lorazepam PO 0.5mg bid, thorazine 50mg q6h PRN, benadryl PO 25mg PO q6h, Tizanidine 2mg bid PRN, Quetiapine 200mg AM, 50mg afternoon, and 200 mg PM, Depakote 500mg AM, 1000mg bedtime, abilify 30mg daily, lurasidone 40 daily  Please continue to take these medication. If you experience bouts of agitation, PLEASE go to psychiatry emergency room NOT medicine ED.        SECONDARY DISCHARGE DIAGNOSES  Diagnosis: Lower extremity weakness  Assessment and Plan of Treatment: You presented to ED with leg weakness which has since resolved. Please follw up with your primary care doctor regarding leg weakness if you experience it again    Diagnosis: Agitation  Assessment and Plan of Treatment:

## 2022-04-05 DIAGNOSIS — R30.0 DYSURIA: ICD-10-CM

## 2022-04-05 PROCEDURE — 99233 SBSQ HOSP IP/OBS HIGH 50: CPT

## 2022-04-05 PROCEDURE — 99232 SBSQ HOSP IP/OBS MODERATE 35: CPT | Mod: GC

## 2022-04-05 RX ORDER — QUETIAPINE FUMARATE 200 MG/1
50 TABLET, FILM COATED ORAL
Refills: 0 | Status: DISCONTINUED | OUTPATIENT
Start: 2022-04-05 | End: 2022-04-07

## 2022-04-05 RX ORDER — QUETIAPINE FUMARATE 200 MG/1
200 TABLET, FILM COATED ORAL
Refills: 0 | Status: DISCONTINUED | OUTPATIENT
Start: 2022-04-05 | End: 2022-04-07

## 2022-04-05 RX ORDER — HALOPERIDOL DECANOATE 100 MG/ML
1 INJECTION INTRAMUSCULAR ONCE
Refills: 0 | Status: COMPLETED | OUTPATIENT
Start: 2022-04-05 | End: 2022-04-05

## 2022-04-05 RX ORDER — QUETIAPINE FUMARATE 200 MG/1
200 TABLET, FILM COATED ORAL DAILY
Refills: 0 | Status: DISCONTINUED | OUTPATIENT
Start: 2022-04-05 | End: 2022-04-05

## 2022-04-05 RX ADMIN — QUETIAPINE FUMARATE 200 MILLIGRAM(S): 200 TABLET, FILM COATED ORAL at 19:39

## 2022-04-05 RX ADMIN — DIVALPROEX SODIUM 1000 MILLIGRAM(S): 500 TABLET, DELAYED RELEASE ORAL at 21:33

## 2022-04-05 RX ADMIN — Medication 50 MILLIGRAM(S): at 00:26

## 2022-04-05 RX ADMIN — QUETIAPINE FUMARATE 200 MILLIGRAM(S): 200 TABLET, FILM COATED ORAL at 11:04

## 2022-04-05 RX ADMIN — HALOPERIDOL DECANOATE 1 MILLIGRAM(S): 100 INJECTION INTRAMUSCULAR at 00:46

## 2022-04-05 RX ADMIN — Medication 25 MILLIGRAM(S): at 11:00

## 2022-04-05 RX ADMIN — APIXABAN 5 MILLIGRAM(S): 2.5 TABLET, FILM COATED ORAL at 21:33

## 2022-04-05 RX ADMIN — DIVALPROEX SODIUM 500 MILLIGRAM(S): 500 TABLET, DELAYED RELEASE ORAL at 11:04

## 2022-04-05 RX ADMIN — APIXABAN 5 MILLIGRAM(S): 2.5 TABLET, FILM COATED ORAL at 10:55

## 2022-04-05 RX ADMIN — ARIPIPRAZOLE 30 MILLIGRAM(S): 15 TABLET ORAL at 10:55

## 2022-04-05 RX ADMIN — HALOPERIDOL DECANOATE 1 MILLIGRAM(S): 100 INJECTION INTRAMUSCULAR at 01:45

## 2022-04-05 RX ADMIN — Medication 325 MILLIGRAM(S): at 11:04

## 2022-04-05 RX ADMIN — Medication 25 MILLIGRAM(S): at 18:00

## 2022-04-05 NOTE — PROVIDER CONTACT NOTE (OTHER) - RECOMMENDATIONS
MD speak to pt
Have doctor come to see pt
Obtain labs in the morning
Renew restraints
Endorse to day nurse
Renew restraints before change of shift, so there is no lapse in documentation

## 2022-04-05 NOTE — PROVIDER CONTACT NOTE (OTHER) - ASSESSMENT
Pt becoming agitate and aggressive when offered medication
Pt is sleeping
Pt is sleeping
Unable to obtain urine sample. Pt is incontinent and is very combative and aggressive
Pt am medications were rescheduled for 0900 per pt request
Pt doesn't want to be stuck anymore
Pt extremely aggressive throughout shift. Pt stil refusing labs at this time

## 2022-04-05 NOTE — PROVIDER CONTACT NOTE (OTHER) - BACKGROUND
Pt admitted for B/L weakness
Pt admitted for unspecified B/L weakness
Pt admitted for BLLE weakness
Pt admitted for unspecified b/l weakness
Pt was admitted for  RLE weakness
Pt was admitted for B/L LE weakness

## 2022-04-05 NOTE — PROGRESS NOTE ADULT - SUBJECTIVE AND OBJECTIVE BOX
Patient is a 36y old  Female who presents with a chief complaint of B/l LE weakness (05 Apr 2022 07:13)     INTERVAL HPI/OVERNIGHT EVENTS:  - No acute events overnight    SUBJECTIVE  - Patient seen and evaluated at bedside  - Patient reports presence of   - Patient reports absence of fevers, chills, HA, lightheadedness, dizziness, nausea, emesis, chest pain, dyspnea, palpitations, abd pain, diarrhea, urinary symptoms, skin color changes or rashes, or LE edema     MEDICATIONS  (STANDING):  apixaban 5 milliGRAM(s) Oral every 12 hours  ARIPiprazole 30 milliGRAM(s) Oral daily  diVALproex  milliGRAM(s) Oral daily  diVALproex DR 1000 milliGRAM(s) Oral at bedtime  ferrous    sulfate 325 milliGRAM(s) Oral daily  hydrocortisone 1% Cream 1 Application(s) Topical once  levothyroxine 75 MICROGram(s) Oral daily  lurasidone 40 milliGRAM(s) Oral daily  mometasone 220 MICROgram(s) Inhaler 1 Puff(s) Inhalation daily  pantoprazole    Tablet 40 milliGRAM(s) Oral before breakfast  QUEtiapine 200 milliGRAM(s) Oral <User Schedule>  QUEtiapine 200 milliGRAM(s) Oral <User Schedule>  QUEtiapine 50 milliGRAM(s) Oral <User Schedule>    MEDICATIONS  (PRN):  ALBUTerol    90 MICROgram(s) HFA Inhaler 2 Puff(s) Inhalation every 6 hours PRN Shortness of Breath and/or Wheezing  chlorproMAZINE    Injectable 50 milliGRAM(s) IntraMuscular every 6 hours PRN agitation  cyclobenzaprine 5 milliGRAM(s) Oral three times a day PRN Muscle Spasm  diphenhydrAMINE Injectable 25 milliGRAM(s) IV Push every 6 hours PRN Sleep or agitation  LORazepam   Injectable 2 milliGRAM(s) IV Push every 4 hours PRN Agitation    Allergies:  latex (Blisters)  morphine (Unknown)  penicillin (Hives)  penicillin (Other)  pineapple (Unknown)  Toradol (Rash)  Toradol (Unknown)  Zofran (Hives)  Zofran (Unknown)    Intolerances:      REVIEW OF SYSTEMS: As indicated above; otherwise, negative    VITAL SIGNS:  T(F): 98.7 (04-05-22 @ 11:16), Max: 99.4 (04-05-22 @ 10:44)  HR: 101 (04-05-22 @ 10:44) (92 - 105)  BP: 107/74 (04-05-22 @ 10:44) (103/74 - 116/72)  RR: 18 (04-05-22 @ 10:44) (17 - 18)  SpO2: 100% (04-05-22 @ 10:44) (98% - 100%)    PHYSICAL EXAM:  General: NAD, well-groomed, well-developed  Eyes: Sclera clear  Chest: Clear to auscultation bilaterally; no rales, rhonchi, or wheezing  Heart: Regular rate and rhythm; normal S1 and S2; no murmurs, rubs, or gallops  Abd: Soft, nontender, nondistended  Nervous System: AAOX3  Psych: Appropriate affect  Ext: no peripheral LE edema bilaterally    LABS:          CAPILLARY BLOOD GLUCOSE        BLOOD CULTURE  04-01 @ 17:05   <10,000 CFU/mL Normal Urogenital Samira  --  --    RADIOLOGY & ADDITIONAL TESTS:    Imaging Personally Reviewed:  [X ] YES     Consultant(s) Notes Reviewed:  Yes    Care Discussed with Consultants/Other Providers: Yes

## 2022-04-05 NOTE — PROVIDER CONTACT NOTE (OTHER) - SITUATION
Have contacted MD multiple times to renew restraints, restraints have still not been renewed, pt maintained on restraints due to safety pending order from MD  told was going to be put in
Pt refusing AM labs
Pt am medications were rescheduled for 0900 per pt request
Pt is hard stick, labs are not being drawn
Pt refusing latuda and abilify
Pt in 948A restraints need to be renewed
Unable to obtain urine sample. Pt is incontinent and is very combative and aggressive

## 2022-04-05 NOTE — BH CONSULTATION LIAISON PROGRESS NOTE - NSBHASSESSMENTFT_PSY_ALL_CORE
37yo single, disabled, female domiciled in [facility] group home, PMHx Factor V Leiden deficiency, DVT/PE, seizure d/o, asthma, endometriosis, intellectual disability, vitamine D deficiency, chronic ischemic heart disease, IBS, GERD, PID, headache syndrome, hypothyroidism, as per chart: PPHx extensive psychiatric (schizoaffective vs. bipolar, personality d/o, mood d/o), outpatient care at the [facility], multiple inpatient psychiatric admissions most recent [place] 7/20-8/21, hx crack/cocaine use dos, prior legal issues (weapons possession, harassment charges), two prior SA (overdose 2017), hx SIB, presents to [facility] ED with subjective b/l LE weakness. Patient likely at baseline. She lacks psychiatric contraindication to discharge when medically cleared      PLAN:  -c/w 1:1 observation given aggression, impulsivity, risk for elopement-expressing suicidal ideation without plan or intent per primary team  -c/w  medications: Latuda 40mg, Abilify 30mg; VPA; Seroquel as ordered  -AGITATION:   	PRN- can first try PO seroquel 50mg q8h prn before using IMs  	PRN Thorazine 50mg PO/IM q6h + Ativan 2mg po/iv/im q4h PRN  - Monitor EKG qtc interval-hold psychotropics if qtc >500  - low threshold for PRN use/ restraints/calling security given hx of aggressive behavior  - NO LOOSE or SHARP  objects within patient's reach.   - NO METALS utensils.   -FAST discharge back to residence once medically/psychiatrically  cleared  - Patient has a legal guardian Maria Elena Brown [phone]; Patient CANNOT refuse transfer back to group home when medically stable  - Patient CANNOT leave AMA-again, consult with guardian.   - Psych to sign off; please call us as needed

## 2022-04-05 NOTE — PROGRESS NOTE ADULT - PROBLEM SELECTOR PLAN 2
B/L LE weakness  CT head: No evidence of acute transcortical infarct, acute intracranial hemorrhage, or mass effect.  -patient ambulates without assistance  Seen by neurology.  F/U PT, OT.  CRISTINA horton appreciated   Fall precautions. B/L LE weakness  CT head: No evidence of acute transcortical infarct, acute intracranial hemorrhage, or mass effect.  -patient ambulates without assistance  -Seen by neurology but without further recs as eval of patient was unremarkable  F/U PT, OT.   eval recs appreciated   Fall precautions.

## 2022-04-05 NOTE — PROGRESS NOTE ADULT - PROBLEM SELECTOR PLAN 7
VTE covered with Eliquis.  Fall, Aspiration, safety and seizure precautions.   PT, OT, neuro eval VTE covered with Eliquis.  Fall, Aspiration, safety and seizure precautions.   PT, OT, neuro eval  Dispo: group home once cleared by psych

## 2022-04-05 NOTE — PROGRESS NOTE ADULT - PROBLEM SELECTOR PLAN 1
Psychiatric hx: bipolar/schizoaffective disorder/explosive personality disorder. Multiple code kevin during hospital course requiring use of multiple security guards. Placed on violent restraints multiple times for aggression and violence against staff.  -c/w 1:1 observation given aggression, impulsivity, risk for elopement-expressing suicidal ideation without plan  - will clarify and c/w home medications: Latuda 40mg, Abilify 30mg  -Psych recs appreciated; they have cleared patient for discharge to patient's group home as per phone convo on 4/4/22  -Medication regimen:   -->Seroquel PO 200mg qd 8am  -->Seroquel PO 50mg qd 12pm  -->Seroquel PO 200mg qd 8pm  -->thorazine 50mg and ativan 2mg q4hrs PRN  - ECG on 4/3/22 without prolonged QTC Psychiatric hx: bipolar/schizoaffective disorder/explosive personality disorder. Multiple code kevin during hospital course requiring use of multiple security guards. Placed on violent restraints multiple times for aggression and violence against staff.  -c/w 1:1 observation given aggression, impulsivity, risk for elopement-expressing suicidal ideation without plan  - will clarify and c/w home medications: Latuda 40mg, Abilify 30mg  -Psych recs appreciated; they have cleared patient for discharge to patient's group home as per phone convo on 4/4/22  -Medication regimen:   -->Seroquel PO 200mg qd 8am  -->Seroquel PO 50mg qd 12pm  -->Seroquel PO 200mg qd 8pm  -->Thorazine 50mg and ativan 2mg q4hrs PRN  - ECG on 4/3/22 without prolonged QTC

## 2022-04-05 NOTE — PROVIDER CONTACT NOTE (OTHER) - DATE AND TIME:
02-Apr-2022 16:48
02-Apr-2022 13:03
05-Apr-2022 00:30
03-Apr-2022 06:50
03-Apr-2022 07:34
03-Apr-2022 07:50
05-Apr-2022 08:20

## 2022-04-05 NOTE — PROVIDER CONTACT NOTE (OTHER) - REASON
Refused AM labs
Pt am medications were rescheduled for 0900 per pt request
Pt is hard stick, labs are not being drawn
Pt refusing abilify and latuda
Pt in 948A restraints need to be renewed
Pt restraints needs to be renewed
Unable to obtain urine sample. Pt is incontintent and is very combative and aggressive

## 2022-04-05 NOTE — CHART NOTE - NSCHARTNOTEFT_GEN_A_CORE
Contacted by nursing staff as patient is screaming, thrashing, banging head on railing of bed despite getting seroquel, thorazine, ativan, benadryl. Ordered for 1 mg haldol IVP Contacted by nursing staff as patient is screaming, thrashing, banging head on railing of bed despite getting seroquel, thorazine, ativan, benadryl. Ordered for 1 mg haldol IVP x2

## 2022-04-05 NOTE — PROVIDER CONTACT NOTE (OTHER) - ACTION/TREATMENT ORDERED:
Endorse to day nurse
Pt previous labs stable, continue to monitor for now
MD said to hold off with urine samples for now
Will renew restraints
NP Sharyn Perrin aware pt is refusing and will look into medications further.
MD Rui Sultana stated labs can be drawn tomorrow morning instead.

## 2022-04-05 NOTE — PROGRESS NOTE ADULT - PROBLEM SELECTOR PLAN 3
3/25/22; bilateral LE US: Deep venous thrombosis in the left popliteal vein, at the knee.  Continue Eliquis 5 mg po BID.  Monitor CBC  -patient declined US imaging of her extremities

## 2022-04-06 DIAGNOSIS — R07.9 CHEST PAIN, UNSPECIFIED: ICD-10-CM

## 2022-04-06 LAB
ALBUMIN SERPL ELPH-MCNC: 4.1 G/DL — SIGNIFICANT CHANGE UP (ref 3.3–5)
ALP SERPL-CCNC: 50 U/L — SIGNIFICANT CHANGE UP (ref 40–120)
ALT FLD-CCNC: 25 U/L — SIGNIFICANT CHANGE UP (ref 4–33)
ANION GAP SERPL CALC-SCNC: 14 MMOL/L — SIGNIFICANT CHANGE UP (ref 7–14)
APPEARANCE UR: CLEAR — SIGNIFICANT CHANGE UP
AST SERPL-CCNC: 25 U/L — SIGNIFICANT CHANGE UP (ref 4–32)
BACTERIA # UR AUTO: NEGATIVE — SIGNIFICANT CHANGE UP
BILIRUB SERPL-MCNC: <0.2 MG/DL — SIGNIFICANT CHANGE UP (ref 0.2–1.2)
BILIRUB UR-MCNC: NEGATIVE — SIGNIFICANT CHANGE UP
BUN SERPL-MCNC: 8 MG/DL — SIGNIFICANT CHANGE UP (ref 7–23)
CALCIUM SERPL-MCNC: 9.4 MG/DL — SIGNIFICANT CHANGE UP (ref 8.4–10.5)
CHLORIDE SERPL-SCNC: 103 MMOL/L — SIGNIFICANT CHANGE UP (ref 98–107)
CHOLEST SERPL-MCNC: 180 MG/DL — SIGNIFICANT CHANGE UP
CO2 SERPL-SCNC: 20 MMOL/L — LOW (ref 22–31)
COLOR SPEC: YELLOW — SIGNIFICANT CHANGE UP
CREAT SERPL-MCNC: 0.75 MG/DL — SIGNIFICANT CHANGE UP (ref 0.5–1.3)
DIFF PNL FLD: ABNORMAL
EGFR: 107 ML/MIN/1.73M2 — SIGNIFICANT CHANGE UP
EPI CELLS # UR: 4 /HPF — SIGNIFICANT CHANGE UP (ref 0–5)
GLUCOSE SERPL-MCNC: 120 MG/DL — HIGH (ref 70–99)
GLUCOSE UR QL: NEGATIVE — SIGNIFICANT CHANGE UP
HCT VFR BLD CALC: 36.5 % — SIGNIFICANT CHANGE UP (ref 34.5–45)
HDLC SERPL-MCNC: 54 MG/DL — SIGNIFICANT CHANGE UP
HGB BLD-MCNC: 11.5 G/DL — SIGNIFICANT CHANGE UP (ref 11.5–15.5)
HYALINE CASTS # UR AUTO: 3 /LPF — SIGNIFICANT CHANGE UP (ref 0–7)
KETONES UR-MCNC: NEGATIVE — SIGNIFICANT CHANGE UP
LEUKOCYTE ESTERASE UR-ACNC: NEGATIVE — SIGNIFICANT CHANGE UP
LIPID PNL WITH DIRECT LDL SERPL: 110 MG/DL — HIGH
MAGNESIUM SERPL-MCNC: 2 MG/DL — SIGNIFICANT CHANGE UP (ref 1.6–2.6)
MCHC RBC-ENTMCNC: 29.3 PG — SIGNIFICANT CHANGE UP (ref 27–34)
MCHC RBC-ENTMCNC: 31.5 GM/DL — LOW (ref 32–36)
MCV RBC AUTO: 93.1 FL — SIGNIFICANT CHANGE UP (ref 80–100)
NITRITE UR-MCNC: NEGATIVE — SIGNIFICANT CHANGE UP
NON HDL CHOLESTEROL: 126 MG/DL — SIGNIFICANT CHANGE UP
NRBC # BLD: 0 /100 WBCS — SIGNIFICANT CHANGE UP
NRBC # FLD: 0 K/UL — SIGNIFICANT CHANGE UP
PH UR: 6.5 — SIGNIFICANT CHANGE UP (ref 5–8)
PHOSPHATE SERPL-MCNC: 3.3 MG/DL — SIGNIFICANT CHANGE UP (ref 2.5–4.5)
PLATELET # BLD AUTO: 133 K/UL — LOW (ref 150–400)
POTASSIUM SERPL-MCNC: 5.2 MMOL/L — SIGNIFICANT CHANGE UP (ref 3.5–5.3)
POTASSIUM SERPL-SCNC: 5.2 MMOL/L — SIGNIFICANT CHANGE UP (ref 3.5–5.3)
PROT SERPL-MCNC: 7.5 G/DL — SIGNIFICANT CHANGE UP (ref 6–8.3)
PROT UR-MCNC: ABNORMAL
RBC # BLD: 3.92 M/UL — SIGNIFICANT CHANGE UP (ref 3.8–5.2)
RBC # FLD: 15 % — HIGH (ref 10.3–14.5)
RBC CASTS # UR COMP ASSIST: 0 /HPF — SIGNIFICANT CHANGE UP (ref 0–4)
SODIUM SERPL-SCNC: 137 MMOL/L — SIGNIFICANT CHANGE UP (ref 135–145)
SP GR SPEC: 1.02 — SIGNIFICANT CHANGE UP (ref 1–1.05)
TRIGL SERPL-MCNC: 81 MG/DL — SIGNIFICANT CHANGE UP
TSH SERPL-MCNC: 1.16 UIU/ML — SIGNIFICANT CHANGE UP (ref 0.27–4.2)
UROBILINOGEN FLD QL: SIGNIFICANT CHANGE UP
WBC # BLD: 6.36 K/UL — SIGNIFICANT CHANGE UP (ref 3.8–10.5)
WBC # FLD AUTO: 6.36 K/UL — SIGNIFICANT CHANGE UP (ref 3.8–10.5)
WBC UR QL: 2 /HPF — SIGNIFICANT CHANGE UP (ref 0–5)

## 2022-04-06 PROCEDURE — 99232 SBSQ HOSP IP/OBS MODERATE 35: CPT | Mod: GC

## 2022-04-06 PROCEDURE — 99232 SBSQ HOSP IP/OBS MODERATE 35: CPT

## 2022-04-06 PROCEDURE — 93010 ELECTROCARDIOGRAM REPORT: CPT

## 2022-04-06 RX ORDER — TIZANIDINE 4 MG/1
2 TABLET ORAL
Refills: 0 | Status: DISCONTINUED | OUTPATIENT
Start: 2022-04-06 | End: 2022-04-07

## 2022-04-06 RX ORDER — CHLORPROMAZINE HCL 10 MG
50 TABLET ORAL EVERY 6 HOURS
Refills: 0 | Status: DISCONTINUED | OUTPATIENT
Start: 2022-04-06 | End: 2022-04-07

## 2022-04-06 RX ORDER — DIPHENHYDRAMINE HCL 50 MG
50 CAPSULE ORAL ONCE
Refills: 0 | Status: COMPLETED | OUTPATIENT
Start: 2022-04-06 | End: 2022-04-06

## 2022-04-06 RX ORDER — DIPHENHYDRAMINE HCL 50 MG
25 CAPSULE ORAL ONCE
Refills: 0 | Status: COMPLETED | OUTPATIENT
Start: 2022-04-06 | End: 2022-04-06

## 2022-04-06 RX ORDER — LANOLIN ALCOHOL/MO/W.PET/CERES
6 CREAM (GRAM) TOPICAL AT BEDTIME
Refills: 0 | Status: DISCONTINUED | OUTPATIENT
Start: 2022-04-06 | End: 2022-04-07

## 2022-04-06 RX ORDER — DIPHENHYDRAMINE HCL 50 MG
25 CAPSULE ORAL EVERY 6 HOURS
Refills: 0 | Status: DISCONTINUED | OUTPATIENT
Start: 2022-04-06 | End: 2022-04-07

## 2022-04-06 RX ADMIN — APIXABAN 5 MILLIGRAM(S): 2.5 TABLET, FILM COATED ORAL at 19:41

## 2022-04-06 RX ADMIN — Medication 25 MILLIGRAM(S): at 03:09

## 2022-04-06 RX ADMIN — Medication 50 MILLIGRAM(S): at 20:03

## 2022-04-06 RX ADMIN — QUETIAPINE FUMARATE 200 MILLIGRAM(S): 200 TABLET, FILM COATED ORAL at 20:02

## 2022-04-06 RX ADMIN — Medication 25 MILLIGRAM(S): at 19:41

## 2022-04-06 RX ADMIN — APIXABAN 5 MILLIGRAM(S): 2.5 TABLET, FILM COATED ORAL at 05:42

## 2022-04-06 RX ADMIN — QUETIAPINE FUMARATE 200 MILLIGRAM(S): 200 TABLET, FILM COATED ORAL at 07:30

## 2022-04-06 RX ADMIN — ARIPIPRAZOLE 30 MILLIGRAM(S): 15 TABLET ORAL at 12:14

## 2022-04-06 RX ADMIN — Medication 50 MILLIGRAM(S): at 12:34

## 2022-04-06 RX ADMIN — CYCLOBENZAPRINE HYDROCHLORIDE 5 MILLIGRAM(S): 10 TABLET, FILM COATED ORAL at 09:24

## 2022-04-06 RX ADMIN — Medication 75 MICROGRAM(S): at 05:42

## 2022-04-06 RX ADMIN — Medication 50 MILLIGRAM(S): at 05:37

## 2022-04-06 RX ADMIN — Medication 325 MILLIGRAM(S): at 12:15

## 2022-04-06 RX ADMIN — PANTOPRAZOLE SODIUM 40 MILLIGRAM(S): 20 TABLET, DELAYED RELEASE ORAL at 05:41

## 2022-04-06 RX ADMIN — DIVALPROEX SODIUM 500 MILLIGRAM(S): 500 TABLET, DELAYED RELEASE ORAL at 12:15

## 2022-04-06 RX ADMIN — QUETIAPINE FUMARATE 50 MILLIGRAM(S): 200 TABLET, FILM COATED ORAL at 12:14

## 2022-04-06 RX ADMIN — LURASIDONE HYDROCHLORIDE 40 MILLIGRAM(S): 40 TABLET ORAL at 12:15

## 2022-04-06 RX ADMIN — Medication 25 MILLIGRAM(S): at 04:49

## 2022-04-06 RX ADMIN — Medication 2 MILLIGRAM(S): at 14:17

## 2022-04-06 RX ADMIN — Medication 2 MILLIGRAM(S): at 10:03

## 2022-04-06 RX ADMIN — CYCLOBENZAPRINE HYDROCHLORIDE 5 MILLIGRAM(S): 10 TABLET, FILM COATED ORAL at 05:37

## 2022-04-06 RX ADMIN — Medication 2 MILLIGRAM(S): at 04:48

## 2022-04-06 RX ADMIN — Medication 50 MILLIGRAM(S): at 10:02

## 2022-04-06 RX ADMIN — MOMETASONE FUROATE 1 PUFF(S): 220 INHALANT RESPIRATORY (INHALATION) at 09:24

## 2022-04-06 RX ADMIN — DIVALPROEX SODIUM 1000 MILLIGRAM(S): 500 TABLET, DELAYED RELEASE ORAL at 21:45

## 2022-04-06 NOTE — PROGRESS NOTE ADULT - PROBLEM SELECTOR PLAN 1
Psychiatric hx: bipolar/schizoaffective disorder/explosive personality disorder. Multiple code kevin during hospital course requiring use of multiple security guards. Placed on violent restraints multiple times for aggression and violence against staff.  -c/w 1:1 observation given aggression, impulsivity, risk for elopement-expressing suicidal ideation without plan  - will clarify and c/w home medications: Latuda 40mg, Abilify 30mg  -Psych recs appreciated; they have cleared patient for discharge to patient's group home as per phone convo on 4/4/22  -Medication regimen:   -->Seroquel PO 200mg qd 8am  -->Seroquel PO 50mg qd 12pm  -->Seroquel PO 200mg qd 8pm  -->Thorazine 50mg and ativan 2mg q4hrs PRN  - ECG on 4/3/22 without prolonged QTC

## 2022-04-06 NOTE — PROGRESS NOTE ADULT - PROBLEM SELECTOR PLAN 8
VTE covered with Eliquis.  Fall, Aspiration, safety and seizure precautions.   PT, OT, neuro eval  Dispo: group home once medically cleared

## 2022-04-06 NOTE — BH CONSULTATION LIAISON PROGRESS NOTE - NSBHASSESSMENTFT_PSY_ALL_CORE
35yo single, disabled, female domiciled in [facility] group [facility], PMHx Factor V Leiden deficiency, DVT/PE, seizure d/o, asthma, endometriosis, intellectual disability, vitamine D deficiency, chronic ischemic heart disease, IBS, GERD, PID, headache syndrome, hypothyroidism, as per chart: PPHx extensive psychiatric (schizoaffective vs. bipolar, personality d/o, mood d/o), outpatient care at the [facility], multiple inpatient psychiatric admissions most recent [place] 7/20-8/21, hx crack/cocaine use dos, prior legal issues (weapons possession, harassment charges), two prior SA (overdose 2017), hx SIB, presents to [facility] ED with subjective b/l LE weakness.    Patient likely at baseline. She was calm and cooperative during this AMs interview. She denies SI/HI. She has an interpersonal conflict with a group home employee but does not seem to want to hurt her/him. Defer to group home with how to address this. Assuming group home is willing to take her back, there is no psychiatric contraindication to discharge. While she has a poor frustration tolerance, being back in her group home is likely best situation for her. I do not believe that sending to inpatient psychiatry is justified and may only reward "acting out behavior."      PLAN:  -c/w 1:1 observation given aggression, impulsivity, risk for elopement-expressing suicidal ideation without plan or intent per primary team  -clarify and c/w home medications: Latuda 40mg, Abilify 30mg; VPA  - Addendum: after speaking with primary hospitalist. regarding seroquel- can increase to 200mg @ 8am, 50mg @ 12pm, 200mg @ 8pm (monitor and hold for hypotension, excessive sedation)  -AGITATION:   	PRN- can first try PO seroquel 50mg q8h prn before using IMs  	PRN Thorazine 50mg PO/IM q6h + Ativan 2mg po/iv/im q4h PRN  - Monitor EKG qtc interval-hold psychotropics if qtc >500  - low threshold for PRN use/ restraints/calling security given hx of aggressive behavior  - NO LOOSE or SHARP  objects within patient's reach.   - NO METALS utensils.   -FAST discharge back to residence once medically/psychiatrically  cleared  - Patient has a legal guardian Maria Elena Brown [phone]; Patient CANNOT refuse transfer back to group home when medically stable  - Patient CANNOT leave AMA-requires psychiatric clearance for discharge

## 2022-04-06 NOTE — PROGRESS NOTE ADULT - PROBLEM SELECTOR PLAN 5
Currently stable at this time.  Not on any hormones. B/L LE weakness  CT head: No evidence of acute transcortical infarct, acute intracranial hemorrhage, or mass effect.  -patient ambulates without assistance  -Seen by neurology but without further recs as eval of patient was unremarkable  F/U PT, OT.   eval recs appreciated   Fall precautions.

## 2022-04-06 NOTE — PROGRESS NOTE ADULT - SUBJECTIVE AND OBJECTIVE BOX
Patient is a 36y old  Female who presents with a chief complaint of B/l LE weakness (06 Apr 2022 07:17)     INTERVAL HPI/OVERNIGHT EVENTS:  - No acute events overnight    SUBJECTIVE  - Patient seen and evaluated at bedside  - Patient reports presence of left groin crease pain and swelling of legs; she says she had chest pain yesterday that was pressure -like for 15 mins; reports presence of skin lesion near R elbow and L elbow; she reports presence of burning sensation with urination; reports presence of black stool and had streka of blood in her stool once about a month ago  - Patient currently reports absence of fevers, chest pain, abd pain, or diarrhea  - She is requesting her phone back and no longer wants intramuscular medications and prefers oral medications    MEDICATIONS  (STANDING):  apixaban 5 milliGRAM(s) Oral every 12 hours  ARIPiprazole 30 milliGRAM(s) Oral daily  diVALproex  milliGRAM(s) Oral daily  diVALproex DR 1000 milliGRAM(s) Oral at bedtime  ferrous    sulfate 325 milliGRAM(s) Oral daily  hydrocortisone 1% Cream 1 Application(s) Topical once  levothyroxine 75 MICROGram(s) Oral daily  lurasidone 40 milliGRAM(s) Oral daily  mometasone 220 MICROgram(s) Inhaler 1 Puff(s) Inhalation daily  pantoprazole    Tablet 40 milliGRAM(s) Oral before breakfast  QUEtiapine 200 milliGRAM(s) Oral <User Schedule>  QUEtiapine 200 milliGRAM(s) Oral <User Schedule>  QUEtiapine 50 milliGRAM(s) Oral <User Schedule>    MEDICATIONS  (PRN):  ALBUTerol    90 MICROgram(s) HFA Inhaler 2 Puff(s) Inhalation every 6 hours PRN Shortness of Breath and/or Wheezing  chlorproMAZINE    Tablet 50 milliGRAM(s) Oral every 6 hours PRN agitation/aggression  cyclobenzaprine 5 milliGRAM(s) Oral three times a day PRN Muscle Spasm  diphenhydrAMINE 25 milliGRAM(s) Oral every 6 hours PRN Combative behavior  LORazepam     Tablet 2 milliGRAM(s) Oral every 4 hours PRN Agitation/aggression    Allergies:  latex (Blisters)  morphine (Unknown)  penicillin (Hives)  penicillin (Other)  pineapple (Unknown)  Toradol (Rash)  Toradol (Unknown)  Zofran (Hives)  Zofran (Unknown)    Intolerances:      REVIEW OF SYSTEMS: As indicated above; otherwise, negative    VITAL SIGNS:  T(F): 98.5 (04-06-22 @ 06:54), Max: 98.7 (04-05-22 @ 14:55)  HR: 106 (04-06-22 @ 06:54) (97 - 106)  BP: 116/76 (04-06-22 @ 06:54) (104/75 - 116/76)  RR: 18 (04-06-22 @ 06:54) (18 - 18)  SpO2: 100% (04-06-22 @ 06:54) (100% - 100%)    PHYSICAL EXAM:  General: NAD  Eyes: Sclera clear  Chest: Clear to auscultation bilaterally; no rales, rhonchi, or wheezing  Heart: Regular rate and rhythm; normal S1 and S2; no murmurs, rubs, or gallops  Abd: Soft, nontender, nondistended  Nervous System: AAOX3  Psych: Appropriate affect  Ext: No appreciable peripheral LE edema bilaterally noted  Skin: Had a cheloid appearing lesion near L elbow     LABS:                        11.5   6.36  )-----------( 133      ( 06 Apr 2022 11:09 )             36.5     06 Apr 2022 11:09    137    |  103    |  8      ----------------------------<  120    5.2     |  20     |  0.75     Ca    9.4        06 Apr 2022 11:09  Phos  3.3       06 Apr 2022 11:09  Mg     2.00      06 Apr 2022 11:09    TPro  7.5    /  Alb  4.1    /  TBili  <0.2   /  DBili  x      /  AST  25     /  ALT  25     /  AlkPhos  50     06 Apr 2022 11:09    CAPILLARY BLOOD GLUCOSE        BLOOD CULTURE    RADIOLOGY & ADDITIONAL TESTS:    Imaging Personally Reviewed:  [X ] YES     Consultant(s) Notes Reviewed:  Yes    Care Discussed with Consultants/Other Providers: Yes

## 2022-04-06 NOTE — PROGRESS NOTE ADULT - PROBLEM SELECTOR PLAN 4
Seizure precautions.  Continue Depakote.  F/U TSH, A1C 3/25/22; bilateral LE US: Deep venous thrombosis in the left popliteal vein, at the knee.  -Continue Eliquis 5 mg po BID  -Monitor CBC 3/25/22; bilateral LE US: Deep venous thrombosis in the left popliteal vein, at the knee.  -Hypercoagulable state due to Factor 5 Leiden Deficiency, now c/b b/l DVT requiring anticoagulation.  -Continue Eliquis 5 mg po BID  -Monitor CBC

## 2022-04-06 NOTE — PROGRESS NOTE ADULT - PROBLEM SELECTOR PLAN 7
VTE covered with Eliquis.  Fall, Aspiration, safety and seizure precautions.   PT, OT, neuro eval  Dispo: group home once cleared by psych Continue Eliquis 5 mg po BID.
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
## 2022-04-06 NOTE — PROGRESS NOTE ADULT - PROBLEM SELECTOR PLAN 3
3/25/22; bilateral LE US: Deep venous thrombosis in the left popliteal vein, at the knee.  Continue Eliquis 5 mg po BID.  Monitor CBC  -patient declined US imaging of her extremities Reports having chest pain that was pressure-like without nausea lasting 15 minutes. Low concern for MI or ACS given very low pretest probability for ACS given her age. Although patient has hx of Factor V Leiden and DVT but is currently treated with Eliquis.  -repeat ECG ordered  -AST/ALT wnl   -will continue to monitor and reassess

## 2022-04-06 NOTE — PROGRESS NOTE ADULT - PROBLEM SELECTOR PLAN 2
B/L LE weakness  CT head: No evidence of acute transcortical infarct, acute intracranial hemorrhage, or mass effect.  -patient ambulates without assistance  -Seen by neurology but without further recs as eval of patient was unremarkable  F/U PT, OT.   eval recs appreciated   Fall precautions. Reports dysuria but without hematuria  -Urinalysis re-ordered  -Urine culture re-ordered  -Holding off abx until urinalysis results; afebrile and hemodynamically stable

## 2022-04-07 ENCOUNTER — EMERGENCY (EMERGENCY)
Facility: HOSPITAL | Age: 37
LOS: 1 days | Discharge: HOME | End: 2022-04-07
Attending: EMERGENCY MEDICINE | Admitting: EMERGENCY MEDICINE
Payer: MEDICARE

## 2022-04-07 ENCOUNTER — TRANSCRIPTION ENCOUNTER (OUTPATIENT)
Age: 37
End: 2022-04-07

## 2022-04-07 VITALS
SYSTOLIC BLOOD PRESSURE: 109 MMHG | DIASTOLIC BLOOD PRESSURE: 72 MMHG | TEMPERATURE: 98 F | OXYGEN SATURATION: 100 % | RESPIRATION RATE: 18 BRPM | HEART RATE: 120 BPM | HEIGHT: 68 IN

## 2022-04-07 VITALS — WEIGHT: 252.21 LBS

## 2022-04-07 LAB
ALBUMIN SERPL ELPH-MCNC: 3.9 G/DL — SIGNIFICANT CHANGE UP (ref 3.3–5)
ALP SERPL-CCNC: 53 U/L — SIGNIFICANT CHANGE UP (ref 40–120)
ALT FLD-CCNC: 23 U/L — SIGNIFICANT CHANGE UP (ref 4–33)
ANION GAP SERPL CALC-SCNC: 17 MMOL/L — HIGH (ref 7–14)
AST SERPL-CCNC: 22 U/L — SIGNIFICANT CHANGE UP (ref 4–32)
BILIRUB SERPL-MCNC: <0.2 MG/DL — SIGNIFICANT CHANGE UP (ref 0.2–1.2)
BUN SERPL-MCNC: 10 MG/DL — SIGNIFICANT CHANGE UP (ref 7–23)
CALCIUM SERPL-MCNC: 9.2 MG/DL — SIGNIFICANT CHANGE UP (ref 8.4–10.5)
CHLORIDE SERPL-SCNC: 103 MMOL/L — SIGNIFICANT CHANGE UP (ref 98–107)
CO2 SERPL-SCNC: 16 MMOL/L — LOW (ref 22–31)
CREAT SERPL-MCNC: 0.82 MG/DL — SIGNIFICANT CHANGE UP (ref 0.5–1.3)
CULTURE RESULTS: SIGNIFICANT CHANGE UP
EGFR: 96 ML/MIN/1.73M2 — SIGNIFICANT CHANGE UP
GLUCOSE BLDC GLUCOMTR-MCNC: 94 MG/DL — SIGNIFICANT CHANGE UP (ref 70–99)
GLUCOSE SERPL-MCNC: 96 MG/DL — SIGNIFICANT CHANGE UP (ref 70–99)
HCT VFR BLD CALC: 41.6 % — SIGNIFICANT CHANGE UP (ref 34.5–45)
HGB BLD-MCNC: 13 G/DL — SIGNIFICANT CHANGE UP (ref 11.5–15.5)
MAGNESIUM SERPL-MCNC: 2 MG/DL — SIGNIFICANT CHANGE UP (ref 1.6–2.6)
MCHC RBC-ENTMCNC: 30.7 PG — SIGNIFICANT CHANGE UP (ref 27–34)
MCHC RBC-ENTMCNC: 31.3 GM/DL — LOW (ref 32–36)
MCV RBC AUTO: 98.1 FL — SIGNIFICANT CHANGE UP (ref 80–100)
NRBC # BLD: 0 /100 WBCS — SIGNIFICANT CHANGE UP
NRBC # FLD: 0 K/UL — SIGNIFICANT CHANGE UP
PHOSPHATE SERPL-MCNC: 3.9 MG/DL — SIGNIFICANT CHANGE UP (ref 2.5–4.5)
PLATELET # BLD AUTO: 141 K/UL — LOW (ref 150–400)
POTASSIUM SERPL-MCNC: 4.9 MMOL/L — SIGNIFICANT CHANGE UP (ref 3.5–5.3)
POTASSIUM SERPL-SCNC: 4.9 MMOL/L — SIGNIFICANT CHANGE UP (ref 3.5–5.3)
PROT SERPL-MCNC: 7.5 G/DL — SIGNIFICANT CHANGE UP (ref 6–8.3)
RBC # BLD: 4.24 M/UL — SIGNIFICANT CHANGE UP (ref 3.8–5.2)
RBC # FLD: 15.4 % — HIGH (ref 10.3–14.5)
SARS-COV-2 RNA SPEC QL NAA+PROBE: SIGNIFICANT CHANGE UP
SODIUM SERPL-SCNC: 136 MMOL/L — SIGNIFICANT CHANGE UP (ref 135–145)
SPECIMEN SOURCE: SIGNIFICANT CHANGE UP
WBC # BLD: 4.65 K/UL — SIGNIFICANT CHANGE UP (ref 3.8–10.5)
WBC # FLD AUTO: 4.65 K/UL — SIGNIFICANT CHANGE UP (ref 3.8–10.5)

## 2022-04-07 PROCEDURE — 93010 ELECTROCARDIOGRAM REPORT: CPT

## 2022-04-07 PROCEDURE — 99239 HOSP IP/OBS DSCHRG MGMT >30: CPT | Mod: GC

## 2022-04-07 PROCEDURE — 99285 EMERGENCY DEPT VISIT HI MDM: CPT | Mod: 25

## 2022-04-07 PROCEDURE — 93010 ELECTROCARDIOGRAM REPORT: CPT | Mod: 59

## 2022-04-07 PROCEDURE — 99232 SBSQ HOSP IP/OBS MODERATE 35: CPT

## 2022-04-07 RX ORDER — DIPHENHYDRAMINE HCL 50 MG
1 CAPSULE ORAL
Qty: 120 | Refills: 0
Start: 2022-04-07 | End: 2022-05-06

## 2022-04-07 RX ORDER — QUETIAPINE FUMARATE 200 MG/1
1 TABLET, FILM COATED ORAL
Qty: 30 | Refills: 0
Start: 2022-04-07 | End: 2022-05-06

## 2022-04-07 RX ORDER — LANOLIN ALCOHOL/MO/W.PET/CERES
2 CREAM (GRAM) TOPICAL
Qty: 60 | Refills: 0
Start: 2022-04-07 | End: 2022-05-06

## 2022-04-07 RX ORDER — APIXABAN 2.5 MG/1
1 TABLET, FILM COATED ORAL
Qty: 60 | Refills: 0
Start: 2022-04-07 | End: 2022-05-06

## 2022-04-07 RX ORDER — HALOPERIDOL DECANOATE 100 MG/ML
1 INJECTION INTRAMUSCULAR
Qty: 0 | Refills: 0 | DISCHARGE

## 2022-04-07 RX ORDER — PANTOPRAZOLE SODIUM 20 MG/1
1 TABLET, DELAYED RELEASE ORAL
Qty: 30 | Refills: 0
Start: 2022-04-07 | End: 2022-05-06

## 2022-04-07 RX ORDER — QUETIAPINE FUMARATE 200 MG/1
1 TABLET, FILM COATED ORAL
Qty: 60 | Refills: 0
Start: 2022-04-07 | End: 2022-05-06

## 2022-04-07 RX ORDER — DIVALPROEX SODIUM 500 MG/1
3 TABLET, DELAYED RELEASE ORAL
Qty: 180 | Refills: 0
Start: 2022-04-07 | End: 2022-06-05

## 2022-04-07 RX ORDER — ARIPIPRAZOLE 15 MG/1
1 TABLET ORAL
Qty: 30 | Refills: 0
Start: 2022-04-07 | End: 2022-05-06

## 2022-04-07 RX ORDER — HYDROCORTISONE 1 %
1 OINTMENT (GRAM) TOPICAL
Qty: 30 | Refills: 0
Start: 2022-04-07 | End: 2022-05-06

## 2022-04-07 RX ORDER — DIPHENHYDRAMINE HCL 50 MG
25 CAPSULE ORAL ONCE
Refills: 0 | Status: COMPLETED | OUTPATIENT
Start: 2022-04-07 | End: 2022-04-07

## 2022-04-07 RX ORDER — TIZANIDINE 4 MG/1
1 TABLET ORAL
Qty: 60 | Refills: 0
Start: 2022-04-07 | End: 2022-05-06

## 2022-04-07 RX ORDER — RAMELTEON 8 MG
1 TABLET ORAL
Qty: 0 | Refills: 0 | DISCHARGE

## 2022-04-07 RX ORDER — CHLORPROMAZINE HCL 10 MG
1 TABLET ORAL
Qty: 120 | Refills: 0
Start: 2022-04-07 | End: 2022-05-06

## 2022-04-07 RX ORDER — OXYMETAZOLINE HYDROCHLORIDE 0.5 MG/ML
SPRAY NASAL
Refills: 0 | Status: DISCONTINUED | OUTPATIENT
Start: 2022-04-07 | End: 2022-04-07

## 2022-04-07 RX ORDER — LURASIDONE HYDROCHLORIDE 40 MG/1
1 TABLET ORAL
Qty: 30 | Refills: 0
Start: 2022-04-07 | End: 2022-05-06

## 2022-04-07 RX ORDER — CETIRIZINE HYDROCHLORIDE 10 MG/1
1 TABLET ORAL
Qty: 0 | Refills: 0 | DISCHARGE

## 2022-04-07 RX ORDER — LEVOTHYROXINE SODIUM 125 MCG
1 TABLET ORAL
Qty: 30 | Refills: 0
Start: 2022-04-07 | End: 2022-05-06

## 2022-04-07 RX ORDER — LIDOCAINE HCL 20 MG/ML
5 VIAL (ML) INJECTION ONCE
Refills: 0 | Status: COMPLETED | OUTPATIENT
Start: 2022-04-07 | End: 2022-04-07

## 2022-04-07 RX ADMIN — Medication 0.5 MILLIGRAM(S): at 17:49

## 2022-04-07 RX ADMIN — QUETIAPINE FUMARATE 200 MILLIGRAM(S): 200 TABLET, FILM COATED ORAL at 07:49

## 2022-04-07 RX ADMIN — Medication 25 MILLIGRAM(S): at 17:00

## 2022-04-07 RX ADMIN — Medication 25 MILLIGRAM(S): at 10:55

## 2022-04-07 RX ADMIN — QUETIAPINE FUMARATE 50 MILLIGRAM(S): 200 TABLET, FILM COATED ORAL at 12:42

## 2022-04-07 RX ADMIN — Medication 50 MILLIGRAM(S): at 15:02

## 2022-04-07 RX ADMIN — APIXABAN 5 MILLIGRAM(S): 2.5 TABLET, FILM COATED ORAL at 06:08

## 2022-04-07 RX ADMIN — Medication 50 MILLIGRAM(S): at 07:49

## 2022-04-07 RX ADMIN — DIVALPROEX SODIUM 500 MILLIGRAM(S): 500 TABLET, DELAYED RELEASE ORAL at 12:40

## 2022-04-07 RX ADMIN — Medication 25 MILLIGRAM(S): at 06:42

## 2022-04-07 RX ADMIN — PANTOPRAZOLE SODIUM 40 MILLIGRAM(S): 20 TABLET, DELAYED RELEASE ORAL at 06:08

## 2022-04-07 RX ADMIN — Medication 2 MILLIGRAM(S): at 10:55

## 2022-04-07 RX ADMIN — ARIPIPRAZOLE 30 MILLIGRAM(S): 15 TABLET ORAL at 12:42

## 2022-04-07 RX ADMIN — MOMETASONE FUROATE 1 PUFF(S): 220 INHALANT RESPIRATORY (INHALATION) at 10:55

## 2022-04-07 RX ADMIN — Medication 2 MILLIGRAM(S): at 14:43

## 2022-04-07 RX ADMIN — Medication 75 MICROGRAM(S): at 06:08

## 2022-04-07 RX ADMIN — Medication 2 MILLIGRAM(S): at 06:42
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
## 2022-04-07 NOTE — BH CONSULTATION LIAISON PROGRESS NOTE - NSBHCHARTREVIEWVS_PSY_A_CORE FT
Vital Signs Last 24 Hrs  T(C): 37.1 (05 Apr 2022 11:16), Max: 37.4 (05 Apr 2022 10:44)  T(F): 98.7 (05 Apr 2022 11:16), Max: 99.4 (05 Apr 2022 10:44)  HR: 101 (05 Apr 2022 10:44) (92 - 105)  BP: 107/74 (05 Apr 2022 10:44) (103/74 - 116/72)  BP(mean): --  RR: 18 (05 Apr 2022 10:44) (17 - 18)  SpO2: 100% (05 Apr 2022 10:44) (98% - 100%)
Vital Signs Last 24 Hrs  T(C): 36.9 (06 Apr 2022 06:54), Max: 37.4 (05 Apr 2022 10:44)  T(F): 98.5 (06 Apr 2022 06:54), Max: 99.4 (05 Apr 2022 10:44)  HR: 106 (06 Apr 2022 06:54) (97 - 106)  BP: 116/76 (06 Apr 2022 06:54) (104/75 - 116/76)  BP(mean): --  RR: 18 (06 Apr 2022 06:54) (18 - 18)  SpO2: 100% (06 Apr 2022 06:54) (100% - 100%)
Vital Signs Last 24 Hrs  T(C): 36.4 (07 Apr 2022 06:11), Max: 37.2 (06 Apr 2022 15:53)  T(F): 97.6 (07 Apr 2022 06:11), Max: 99 (06 Apr 2022 15:53)  HR: 98 (07 Apr 2022 06:11) (95 - 103)  BP: 117/79 (07 Apr 2022 06:11) (98/75 - 117/79)  BP(mean): --  RR: 17 (07 Apr 2022 06:11) (17 - 17)  SpO2: 100% (07 Apr 2022 06:11) (99% - 100%)
Vital Signs Last 24 Hrs  T(C): 36.7 (04 Apr 2022 07:28), Max: 37 (03 Apr 2022 14:40)  T(F): 98.1 (04 Apr 2022 07:28), Max: 98.6 (03 Apr 2022 14:40)  HR: 87 (04 Apr 2022 07:28) (87 - 97)  BP: 115/73 (04 Apr 2022 07:28) (101/63 - 115/73)  BP(mean): --  RR: 17 (04 Apr 2022 07:28) (17 - 18)  SpO2: 100% (04 Apr 2022 07:28) (98% - 100%)
Vital Signs Last 24 Hrs  T(C): 37.3 (03 Apr 2022 07:15), Max: 37.3 (03 Apr 2022 07:15)  T(F): 99.1 (03 Apr 2022 07:15), Max: 99.1 (03 Apr 2022 07:15)  HR: 97 (03 Apr 2022 07:15) (85 - 109)  BP: 113/66 (03 Apr 2022 07:15) (101/63 - 124/73)  BP(mean): --  RR: 18 (03 Apr 2022 07:15) (17 - 19)  SpO2: 97% (03 Apr 2022 07:15) (95% - 100%)

## 2022-04-07 NOTE — BH CONSULTATION LIAISON PROGRESS NOTE - CURRENT MEDICATION
MEDICATIONS  (STANDING):  apixaban 5 milliGRAM(s) Oral every 12 hours  ARIPiprazole 30 milliGRAM(s) Oral daily  diVALproex  milliGRAM(s) Oral daily  diVALproex DR 1000 milliGRAM(s) Oral at bedtime  ferrous    sulfate 325 milliGRAM(s) Oral daily  hydrocortisone 1% Cream 1 Application(s) Topical once  levothyroxine 75 MICROGram(s) Oral daily  lurasidone 40 milliGRAM(s) Oral daily  mometasone 220 MICROgram(s) Inhaler 1 Puff(s) Inhalation daily  pantoprazole    Tablet 40 milliGRAM(s) Oral before breakfast  QUEtiapine 200 milliGRAM(s) Oral <User Schedule>  QUEtiapine 200 milliGRAM(s) Oral <User Schedule>  QUEtiapine 50 milliGRAM(s) Oral <User Schedule>    MEDICATIONS  (PRN):  ALBUTerol    90 MICROgram(s) HFA Inhaler 2 Puff(s) Inhalation every 6 hours PRN Shortness of Breath and/or Wheezing  chlorproMAZINE    Tablet 50 milliGRAM(s) Oral every 6 hours PRN agitation/aggression  cyclobenzaprine 5 milliGRAM(s) Oral three times a day PRN Muscle Spasm  diphenhydrAMINE 25 milliGRAM(s) Oral every 6 hours PRN Combative behavior  LORazepam     Tablet 2 milliGRAM(s) Oral every 4 hours PRN Agitation/aggression  
MEDICATIONS  (STANDING):  apixaban 5 milliGRAM(s) Oral every 12 hours  ARIPiprazole 30 milliGRAM(s) Oral daily  diVALproex  milliGRAM(s) Oral daily  diVALproex DR 1000 milliGRAM(s) Oral at bedtime  ferrous    sulfate 325 milliGRAM(s) Oral daily  hydrocortisone 1% Cream 1 Application(s) Topical once  levothyroxine 75 MICROGram(s) Oral daily  lurasidone 40 milliGRAM(s) Oral daily  mometasone 220 MICROgram(s) Inhaler 1 Puff(s) Inhalation daily  pantoprazole    Tablet 40 milliGRAM(s) Oral before breakfast  QUEtiapine 200 milliGRAM(s) Oral <User Schedule>  QUEtiapine 200 milliGRAM(s) Oral <User Schedule>  QUEtiapine 50 milliGRAM(s) Oral <User Schedule>    MEDICATIONS  (PRN):  ALBUTerol    90 MICROgram(s) HFA Inhaler 2 Puff(s) Inhalation every 6 hours PRN Shortness of Breath and/or Wheezing  chlorproMAZINE    Injectable 50 milliGRAM(s) IntraMuscular every 6 hours PRN agitation  cyclobenzaprine 5 milliGRAM(s) Oral three times a day PRN Muscle Spasm  diphenhydrAMINE Injectable 25 milliGRAM(s) IV Push every 6 hours PRN Sleep or agitation  LORazepam   Injectable 2 milliGRAM(s) IV Push every 4 hours PRN Agitation  
MEDICATIONS  (STANDING):  apixaban 5 milliGRAM(s) Oral every 12 hours  ARIPiprazole 30 milliGRAM(s) Oral daily  diVALproex  milliGRAM(s) Oral daily  diVALproex DR 1000 milliGRAM(s) Oral at bedtime  ferrous    sulfate 325 milliGRAM(s) Oral daily  hydrocortisone 1% Cream 1 Application(s) Topical once  levothyroxine 75 MICROGram(s) Oral daily  lurasidone 40 milliGRAM(s) Oral daily  mometasone 220 MICROgram(s) Inhaler 1 Puff(s) Inhalation daily  pantoprazole    Tablet 40 milliGRAM(s) Oral before breakfast  QUEtiapine 200 milliGRAM(s) Oral two times a day    MEDICATIONS  (PRN):  ALBUTerol    90 MICROgram(s) HFA Inhaler 2 Puff(s) Inhalation every 6 hours PRN Shortness of Breath and/or Wheezing  chlorproMAZINE    Injectable 50 milliGRAM(s) IntraMuscular every 6 hours PRN agitation  cyclobenzaprine 5 milliGRAM(s) Oral three times a day PRN Muscle Spasm  diphenhydrAMINE Injectable 25 milliGRAM(s) IV Push every 6 hours PRN Sleep or agitation  LORazepam   Injectable 2 milliGRAM(s) IV Push every 4 hours PRN Agitation  
MEDICATIONS  (STANDING):  apixaban 5 milliGRAM(s) Oral every 12 hours  ARIPiprazole 30 milliGRAM(s) Oral daily  diVALproex  milliGRAM(s) Oral daily  diVALproex DR 1000 milliGRAM(s) Oral at bedtime  ferrous    sulfate 325 milliGRAM(s) Oral daily  hydrocortisone 1% Cream 1 Application(s) Topical once  levothyroxine 75 MICROGram(s) Oral daily  lurasidone 40 milliGRAM(s) Oral daily  melatonin 6 milliGRAM(s) Oral at bedtime  mometasone 220 MICROgram(s) Inhaler 1 Puff(s) Inhalation daily  pantoprazole    Tablet 40 milliGRAM(s) Oral before breakfast  QUEtiapine 200 milliGRAM(s) Oral <User Schedule>  QUEtiapine 200 milliGRAM(s) Oral <User Schedule>  QUEtiapine 50 milliGRAM(s) Oral <User Schedule>    MEDICATIONS  (PRN):  ALBUTerol    90 MICROgram(s) HFA Inhaler 2 Puff(s) Inhalation every 6 hours PRN Shortness of Breath and/or Wheezing  chlorproMAZINE    Tablet 50 milliGRAM(s) Oral every 6 hours PRN agitation/aggression  diphenhydrAMINE 25 milliGRAM(s) Oral every 6 hours PRN Combative behavior  LORazepam     Tablet 2 milliGRAM(s) Oral every 4 hours PRN Agitation/aggression  tiZANidine 2 milliGRAM(s) Oral <User Schedule> PRN Muscle Spasm  
MEDICATIONS  (STANDING):  apixaban 5 milliGRAM(s) Oral every 12 hours  ARIPiprazole 30 milliGRAM(s) Oral daily  diVALproex  milliGRAM(s) Oral daily  diVALproex DR 1000 milliGRAM(s) Oral at bedtime  ferrous    sulfate 325 milliGRAM(s) Oral daily  levothyroxine 75 MICROGram(s) Oral daily  lurasidone 40 milliGRAM(s) Oral daily  mometasone 220 MICROgram(s) Inhaler 1 Puff(s) Inhalation daily  pantoprazole    Tablet 40 milliGRAM(s) Oral before breakfast  QUEtiapine 100 milliGRAM(s) Oral two times a day    MEDICATIONS  (PRN):  ALBUTerol    90 MICROgram(s) HFA Inhaler 2 Puff(s) Inhalation every 6 hours PRN Shortness of Breath and/or Wheezing  chlorproMAZINE    Injectable 50 milliGRAM(s) IntraMuscular every 6 hours PRN agitation  cyclobenzaprine 5 milliGRAM(s) Oral three times a day PRN Muscle Spasm  diphenhydrAMINE Injectable 25 milliGRAM(s) IV Push every 6 hours PRN Combative behavior  LORazepam   Injectable 2 milliGRAM(s) IV Push every 4 hours PRN Agitation

## 2022-04-07 NOTE — DISCHARGE NOTE NURSING/CASE MANAGEMENT/SOCIAL WORK - NSDCPEFALRISK_GEN_ALL_CORE
For information on Fall & Injury Prevention, visit: [email or web address] OR  [email or web address] OR  https://www.cdc.gov/steadi/patient.html

## 2022-04-07 NOTE — BH CONSULTATION LIAISON PROGRESS NOTE - NSBHCONSULTFOLLOW_PSY_ALL_CORE
No, psychiatric follow up needed - call with questions...
Yes...
Yes...

## 2022-04-07 NOTE — PROGRESS NOTE ADULT - PROBLEM SELECTOR PLAN 2
Reports dysuria but without hematuria  -Urinalysis re-ordered  -Urine culture re-ordered  -Holding off abx until urinalysis results; afebrile and hemodynamically stable Reports dysuria but without hematuria  -Urinalysis unremarkable  -Urine culture unremarkable  -afebrile and hemodynamically stable Reports dysuria but without hematuria. Isolated event, no subsequent complaints of this.   -Urinalysis unremarkable  -Urine culture unremarkable  -afebrile and hemodynamically stable

## 2022-04-07 NOTE — BH CONSULTATION LIAISON PROGRESS NOTE - NSBHCONSULTFOLLOWAFTERCARE_PSY_A_CORE FT
[facility]  [address] [facility] [address] [phone]    Her group home and outpatient psychiatric team
[facility]  [address] [facility] [address] [phone]    Her group home and outpatient psychiatric team
see above
[facility]  [address] [facility] [address] [phone]    Her group home and outpatient psychiatric team

## 2022-04-07 NOTE — PROGRESS NOTE ADULT - PROBLEM SELECTOR PLAN 8
VTE covered with Eliquis.  Fall, Aspiration, safety and seizure precautions.   PT, OT, neuro eval  Dispo: group home once medically cleared VTE covered with Eliquis.  Fall, Aspiration, safety and seizure precautions.   PT, OT, neuro eval  Dispo: group home

## 2022-04-07 NOTE — BH CONSULTATION LIAISON PROGRESS NOTE - NSBHCHARTREVIEWLAB_PSY_A_CORE FT
no new cbc or bmp
                      13.0   4.65  )-----------( 141      ( 07 Apr 2022 07:36 )             41.6   04-07    136  |  103  |  10  ----------------------------<  96  4.9   |  16<L>  |  0.82    Ca    9.2      07 Apr 2022 07:36  Phos  3.9     04-07  Mg     2.00     04-07    TPro  7.5  /  Alb  3.9  /  TBili  <0.2  /  DBili  x   /  AST  22  /  ALT  23  /  AlkPhos  53  04-07  
CBC Full  -  ( 01 Apr 2022 18:12 )  WBC Count : 8.07 K/uL  RBC Count : 3.71 M/uL  Hemoglobin : 11.4 g/dL  Hematocrit : 34.2 %  Platelet Count - Automated : 120 K/uL  Mean Cell Volume : 92.2 fL  Mean Cell Hemoglobin : 30.7 pg  Mean Cell Hemoglobin Concentration : 33.3 gm/dL  Auto Neutrophil # : 5.13 K/uL  Auto Lymphocyte # : 1.94 K/uL  Auto Monocyte # : 0.89 K/uL  Auto Eosinophil # : 0.05 K/uL  Auto Basophil # : 0.03 K/uL  Auto Neutrophil % : 63.6 %  Auto Lymphocyte % : 24.0 %  Auto Monocyte % : 11.0 %  Auto Eosinophil % : 0.6 %  Auto Basophil % : 0.4 %  04-01    140  |  107  |  8   ----------------------------<  158<H>  5.0   |  19<L>  |  0.84    Ca    9.4      01 Apr 2022 18:12  Mg     1.80     04-01    TPro  7.5  /  Alb  4.1  /  TBili  0.3  /  DBili  x   /  AST  35<H>  /  ALT  27  /  AlkPhos  51  04-01

## 2022-04-07 NOTE — ED ADULT TRIAGE NOTE - CHIEF COMPLAINT QUOTE
states "she has not been feeling well for a couple of days" pt c/o nausea and weakness. dc from [facility] today at noon. states "she has not been feeling well for a couple of days" pt c/o nausea and weakness. dc from [facility] today at noon. hx: seizures, intellectual disability, schizoaffective vs. bipolar, personality d/o.

## 2022-04-07 NOTE — BH CONSULTATION LIAISON PROGRESS NOTE - NSICDXBHSECONDARYDX_PSY_ALL_CORE
Intellectual disability   F79  Explosive personality disorder   F60.3  

## 2022-04-07 NOTE — BH CONSULTATION LIAISON PROGRESS NOTE - NSBHCONSFOLLOWNEEDS_PSY_ALL_CORE
No psychiatric contraindications to discharge
Needs further psych safety assessment prior to discharge

## 2022-04-07 NOTE — BH CONSULTATION LIAISON PROGRESS NOTE - NSBHFUPINTERVALHXFT_PSY_A_CORE
Chart reviewed. Patient seen this AM. She is calm and pleasant. She states that she has an issue with one of the group home employees- she feels like they do not like each other and that they start to curse at each other. With that said, she denies SI/HI intent or plan. She wants to get PT because she is worried that she is unsteady on her feet. She also agrees to ask for medication if she is feeling stressed. She is not sure if she took seroquel this AM- she does not think that she did, though RN believes she did. Of note, she says that her latuda had been discontinued at home.   Otherwise, she has no complaints.
Chart reviewed. Received PRNs overnight. Sleeping on exam, did not awaken as she gets agitated with engagement. Per 1:1 she did well today, ate, showered, etc. Does get suspicious when others are talking about her. 
Chart reviewed. Received PRN. Seen this AM, she is out of restraints sitting pleasantly in her bed. She states that she feels good. Denies feeling depressed. Said that breakfast was delicious. Denies SI/HI. Denies AH/VH. States that she does not physically feel ok. Asks if she can have her phone so she can listen to music.  Otherwise, no complaints.
Chart reviewed. Patient had RRT called. Per PCA, patient was complaining that she thought she was going to have a seizure. She then became unresponsive and RRT called. Decision by primary team was this was not a seizure.  I saw the patient afterwards. She is crying and states that she physically does not feel well. She cannot explain her symptoms. She then states that she is scared to go back to the group home and that she does not want to go back to the group home. She explains that she feels the group home does not like her. She denies SI/HI. Denies AH/VH. I attempted to offer support.  I also suggested we give her ativan for anxiety and she agreed.
VSS. CT head WNL. O/N PRNs: Thorazine 50mg IM x1 and benadryl 25mg IM x1.     Pt asleep during exam this AM. Per staff at bedside, pt continues to have episodes of agitation and emotional dysregulation. She refused to take Abilify but has been taking Depakote 1000mg QHS, 500mg QD and Seroquel 100mg BID.

## 2022-04-07 NOTE — ED ADULT TRIAGE NOTE - IDEAL BODY WEIGHT(KG)
Verbal order given by NEYMAR Hayes. verbal consent given by patient for medication administration. Patient tolerated without incident. See MAR for full documentation     66

## 2022-04-07 NOTE — PROGRESS NOTE ADULT - PROBLEM SELECTOR PLAN 1
Psychiatric hx: bipolar/schizoaffective disorder/explosive personality disorder. Multiple code kevin during hospital course requiring use of multiple security guards. Placed on violent restraints multiple times for aggression and violence against staff.  -c/w 1:1 observation given aggression, impulsivity, risk for elopement-expressing suicidal ideation without plan  - will clarify and c/w home medications: Latuda 40mg, Abilify 30mg  -Psych recs appreciated; they have cleared patient for discharge to patient's group home as per phone convo on 4/4/22  -Medication regimen:   -->Seroquel PO 200mg qd 8am  -->Seroquel PO 50mg qd 12pm  -->Seroquel PO 200mg qd 8pm  -->Thorazine 50mg and ativan 2mg q4hrs PRN  - ECG on 4/3/22 without prolonged QTC Psychiatric hx: bipolar/schizoaffective disorder/explosive personality disorder. Multiple code kevin during hospital course requiring use of multiple security guards. Placed on violent restraints multiple times for aggression and violence against staff.  -c/w 1:1 observation given aggression, impulsivity, risk for elopement-expressing suicidal ideation without plan  - will clarify and c/w home medications: Latuda 40mg, Abilify 30mg  -Psych recs appreciated; they have cleared patient for discharge to patient's group home as per phone convo on 4/4/22  -Medication regimen:   -->Seroquel PO 200mg qd 8am  -->Seroquel PO 50mg qd 12pm  -->Seroquel PO 200mg qd 8pm  -->Thorazine 50mg and ativan 2mg q4hrs PRN  - ECG on 4/3/22 without prolonged QTC  -Added ativan 05mg oral at 10am qd and ativan 0.5mg oral at 4pm qd Psychiatric hx: bipolar/schizoaffective disorder/explosive personality disorder. Multiple code kevin during hospital course requiring use of multiple security guards. Placed on violent restraints multiple times for aggression and violence against staff.  -c/w 1:1 observation given aggression, impulsivity, risk for elopement-expressing suicidal ideation without plan  - will clarify and c/w home medications: Latuda 40mg, Abilify 30mg  -Psych recs appreciated; they have cleared patient for discharge to patient's group home as per phone convo on 4/4/22  -Medication regimen:   -->Seroquel PO 200mg qd 8am  -->Seroquel PO 50mg qd 12pm  -->Seroquel PO 200mg qd 8pm  -->Thorazine 50mg and ativan 2mg q4hrs PRN  - ECG on 4/3/22 without prolonged QTC  -Added ativan 05mg oral at 10am qd and ativan 0.5mg oral at 4pm qd  -New regimen was reviewed w/ group home staff. New Rx sent to pharmacy via [employer].

## 2022-04-07 NOTE — BH CONSULTATION LIAISON PROGRESS NOTE - NSICDXBHPRIMARYDX_PSY_ALL_CORE
Impulse control disorder   F63.9  

## 2022-04-07 NOTE — DISCHARGE NOTE NURSING/CASE MANAGEMENT/SOCIAL WORK - NSDCFUADDAPPT_GEN_ALL_CORE_FT
[facility]  [address] [place] [id number] [phone]

## 2022-04-07 NOTE — BH CONSULTATION LIAISON PROGRESS NOTE - NSBHFUPREASONCONS_PSY_A_CORE
agitation
agitation/med management
anxiety/med management/other...
agitation
agitation/med management

## 2022-04-07 NOTE — RAPID RESPONSE TEAM SUMMARY - NSSITUATIONBACKGROUNDRRT_GEN_ALL_CORE
RRT called for AMS. Pt pending discharge but stated that she was going to have a seizure, walked to her bed and then became unresponsive. When assessed at bedside VSS FS wnl PEERLA and patient not responding to verbal stimuli. Demonstrated some shaking which self resolved. Also found to be protecting head when arm dropped on it. During RRT, alertness gradually improved. Primary team at bedside and would assess further.

## 2022-04-07 NOTE — BH CONSULTATION LIAISON PROGRESS NOTE - NSBHCONSULTWORKUPEKGFT_PSY_ALL_CORE
see above, Monitor platelets , check BHCG

## 2022-04-07 NOTE — PROGRESS NOTE ADULT - ATTENDING COMMENTS
Patient is a 36 year old woman w/ intellectual disability, psych disorder and seizure disorder coming in due to lower extremity weakness, lower extremity pain. All of which have resolved. Dopplers discontinued given clinical improvement. Neuro recommendations appreciated. Psych consulted for assistance. W/ intermittent episodes of agitation during this admission. Currently calm on my exam. No clear organic neurological etiology at this time for initial presenting symptoms. D'c pending psychiatric optimization, for return to group home.     - increased Seroquel to 200mg BID 4/3
Patient is a 36 year old woman w/ intellectual disability, psych disorder and seizure disorder coming in due to inability to ambulate, lower extremity pain. Patient pending dopplers. Neuro recommendations appreciated. Psychiatry consulted for assistance. No clear organic neurological etiology at this time. D'c pending workup.
36F history of Factor 5 Leiden Deficiency, Seizures, DVT/ PE on Eliquis sent by group home for behavioral disturbance. Psych on board. Remains hospitalized pending further optimization as pt remains on restraints, requiring IMs, and 1:1.    Pt seen at bedside this AM. Sleeping well. Ate breakfast. No behavioral outburst as per staff. No labs today.    -Continue psych regimen.  -Attempt to take off restraints as she can tolerate.  -Remains acute due to behavioral issues.  Closely follow up psych.  -Anticipate return to group home, no plan for inpatient psych at this time.
36F history of Factor 5 Leiden Deficiency, Seizures, DVT/ PE on Eliquis sent by group home for behavioral disturbance. Psych on board. Hospital course c/b code kevin and requiring restraints and IM medication. Medication titrated. Pt slowly improving. Pt has been off restraints. Dispo planning has started.    Pt seen at bedside this AM. Per sitter, pt was up and about. She mentioned feeling an aura of seizure coming on. She then returned to bed. When medical team arrived, she was found unresponsive/eyes closed at bedside. She had mild generalized shaking activites.  RRT activated. VS obtained, appeared stable. Blink reflex to threat intact. Pt able to avoided hand landing on her face when dropped from above. Pt also able to keep arm up against gravity when side rails down. No other red flag findings on exam. Pt eventually woke up. She was tearful. Stated that she did not want to go home. Asked specifically about her concerns -- she stated that she does not get along w/ staff at facility. Labs from this AM reviewed - no significant changes.     -Not a true seizure event based on hx and assessment. D/w RN and staff-- Pt later acknowledged that it was not a seizure event.  -D/w psych - continue current regimen - will add ativan 0.5 BID for anxiety. Still no contraindications for DC back to group home. Medically, pt remains stable - also med clear for DC back to group home.  -Pt is at high risk for rehospitalization or ED visits in the future. Team will reach out to group home to discuss hospital course and discharge recommendations. Patient would benefit from close follow up and supportive care upon DC.  -Further follow up to be pursued as OP.     Rest of plan as above. 40 minutes spent preparing DC, counseling pt, and coordination of care.
36F history of Factor 5 Leiden Deficiency, Seizures, DVT/ PE on Eliquis sent by group [facility] for behavioral disturbance. Psych on board. Hospital course c/b code kevin and requiring restraints and IM medication. Medication titrated. Pt slowly improving. Pt is now off restraints. Goal directed, cooperative.     Pt seen at bedside this AM. Reported pain/swelling at her bilateral deltoids, likely 2' IM injections. She also reports two firm rash at her bilateral elbows. Labs w/ improved plt and Hb.    -Continue psych regimen - this will be continued as OP. Stop all IM PRNs will transition to PO. Stable off restraints. Cautiously monitor at bedside for behavioral disturbances. Case also d/w psych attending. Appreciate f/u.  -Will f/u with group [facility] for dispo planning and review med list. Will also discuss contingency place in case of behavioral issue upon return. Pt will benefit close follow up.  -Warm compresses for deltoid pain. Area inflammed likely from IM injections. No active infection suspected.   -Trial muscle relaxants. Avoid narcotic use. This was discussed w/ pt.  -Derm lesion may be followed up as OP.    Anticipate DC in next 24h.  Rest of plan as above.
36F history of Factor 5 Leiden Deficiency, Seizures, DVT/ PE on Eliquis sent by group home for behavioral disturbance. Psych on board. Remains hospitalized pending further optimization as pt remains on restraints, requiring IMs, and 1:1.    Pt seen at bedside this AM. Code gray activated due to banging her head and safety concerns. Pt medicated and now calm and resting. She was later placed back on restraints. Psych evaluated pt, case d/w attending.    -Seroquel PRN added. Re-time standing order -> seroquel at 8AM/12PM/8PM as above.  -Remains acute due to behavioral issues.  Closely follow up psych.  -Anticipate return to group home, no plan for inpatient psych at this time.

## 2022-04-07 NOTE — PROGRESS NOTE ADULT - PROBLEM SELECTOR PLAN 5
B/L LE weakness  CT head: No evidence of acute transcortical infarct, acute intracranial hemorrhage, or mass effect.  -patient ambulates without assistance  -Seen by neurology but without further recs as eval of patient was unremarkable  F/U PT, OT.   eval recs appreciated   Fall precautions.

## 2022-04-07 NOTE — PROGRESS NOTE ADULT - ASSESSMENT
36F history of Factor 5 Leiden Deficiency, Seizures, DVT/ PE on Eliquis admitted for b/l LE weakness and pain c/b aggression and violence against staff requiring medications and violent restraints     36F history of Factor 5 Leiden Deficiency, Seizures, DVT/ PE on Eliquis admitted for b/l LE weakness and pain c/b aggression and violence against staff requiring medications and violent restraints.

## 2022-04-07 NOTE — PROGRESS NOTE ADULT - PROBLEM SELECTOR PLAN 3
Reports having chest pain that was pressure-like without nausea lasting 15 minutes. Low concern for MI or ACS given very low pretest probability for ACS given her age. Although patient has hx of Factor V Leiden and DVT but is currently treated with Eliquis.  -repeat ECG ordered  -AST/ALT wnl   -will continue to monitor and reassess

## 2022-04-07 NOTE — CHART NOTE - NSCHARTNOTEFT_GEN_A_CORE
Patient med stable for DC. DC summary/med rec is done.   DC sum faxed to [phone]  Attempted to call RN supervisor Sahil unable to reach around 405PM.  Message was left re: dispo plan along with callback number.  RAVINDRA already provided my callback number in case of any issues.

## 2022-04-07 NOTE — DISCHARGE NOTE NURSING/CASE MANAGEMENT/SOCIAL WORK - PATIENT PORTAL LINK FT
You can access the FollowMyHealth Patient Portal offered by [facility] by registering at the following website: [email or web address]. By joining [employer]’s FollowMyHealth portal, you will also be able to view your health information using other applications (apps) compatible with our system.

## 2022-04-07 NOTE — PROGRESS NOTE ADULT - PROBLEM SELECTOR PLAN 4
3/25/22; bilateral LE US: Deep venous thrombosis in the left popliteal vein, at the knee.  -Hypercoagulable state due to Factor 5 Leiden Deficiency, now c/b b/l DVT requiring anticoagulation.  -Continue Eliquis 5 mg po BID  -Monitor CBC

## 2022-04-07 NOTE — BH CONSULTATION LIAISON PROGRESS NOTE - MSE UNSTRUCTURED FT
Mental Status Exam:  Coleen: well groomed, fair hygiene     Behavior: calm, cooperative, no psychomotor retardation/agitation  Motor: no tremors, EPS, or rigidity  Gait: did not assess, pt in bed  Speech: normal rate, rhythm, prosody and volume   Mood: "okay"  Affect: euthymic, congruent  Thought process: clear, goal directed   Thought Content: denies paranoia, delusions, talks about employee at group home  Perception: denies AH/VH  SI: denies  HI: denies  Insight: fair  Judgment: fair    Cognitive Exam:  Orientation: AOx3  Attention: intact  
Sleeping on exam, did not awaken as she gets agitated with engagement. Per 1:1 she did well today, ate, showered, etc. Does get suspicious when others are talking about her. 
Mental Status Exam:  Coleen: well groomed, fair hygiene     Behavior: calm, cooperative, no psychomotor retardation/agitation  Motor: no tremors, EPS, or rigidity  Gait: did not assess, pt in bed  Speech: normal rate, rhythm, prosody and volume   Mood: "okay"  Affect: euthymic, congruent  Thought process: clear, goal directed   Thought Content: denies paranoia, delusions, talks about breakfast and asks for phone  Perception: denies AH/VH  SI: denies  HI: denies  Insight: fair  Judgment: fair    Cognitive Exam:  Orientation: AOx3  Attention: intact  
Mental Status Exam:  Coleen: well groomed, fair hygiene     Behavior: calm, cooperative, no psychomotor retardation/agitation  Motor: no tremors, EPS, or rigidity  Gait: did not assess, pt in bed  Speech: normal rate, rhythm, prosody and volume   Mood: "scared"  Affect: dysthymic, anxious  Thought process: clear, goal directed   Thought Content: denies paranoia, delusions, talks about being scared to go to group home  Perception: denies AH/VH  SI: denies  HI: denies  Insight: fair  Judgment: fair    Cognitive Exam:  Orientation: AOx3  Attention: intact

## 2022-04-07 NOTE — PROGRESS NOTE ADULT - PROBLEM SELECTOR PLAN 6
Seizure precautions.  -Continue Depakote.  -TSH wnl Seizure precautions.  -Continue Depakote.  -TSH wnl  -recent seizure-like activity unremarkable and reported feigning

## 2022-04-07 NOTE — PROGRESS NOTE ADULT - SUBJECTIVE AND OBJECTIVE BOX
Patient is a 36y old  Female who presents with a chief complaint of B/l LE weakness (07 Apr 2022 07:44)     INTERVAL HPI/OVERNIGHT EVENTS:  - No acute events overnight  - Had seizure-like activity for which a RTT was called; however per nurse patient admitted she caused a scene because she was told she was going to      SUBJECTIVE  - Patient seen and evaluated at bedside  - Patient reports presence of mild nausea, burning sensation with urination  - Patient reports absence of fevers, chills, HA, lightheadedness, dizziness, nausea, emesis, chest pain, dyspnea, palpitations, abd pain, diarrhea, urinary symptoms, skin color changes or rashes, or LE edema     MEDICATIONS  (STANDING):  apixaban 5 milliGRAM(s) Oral every 12 hours  ARIPiprazole 30 milliGRAM(s) Oral daily  diVALproex  milliGRAM(s) Oral daily  diVALproex DR 1000 milliGRAM(s) Oral at bedtime  hydrocortisone 1% Cream 1 Application(s) Topical once  levothyroxine 75 MICROGram(s) Oral daily  lurasidone 40 milliGRAM(s) Oral daily  melatonin 6 milliGRAM(s) Oral at bedtime  mometasone 220 MICROgram(s) Inhaler 1 Puff(s) Inhalation daily  pantoprazole    Tablet 40 milliGRAM(s) Oral before breakfast  QUEtiapine 200 milliGRAM(s) Oral <User Schedule>  QUEtiapine 200 milliGRAM(s) Oral <User Schedule>  QUEtiapine 50 milliGRAM(s) Oral <User Schedule>    MEDICATIONS  (PRN):  ALBUTerol    90 MICROgram(s) HFA Inhaler 2 Puff(s) Inhalation every 6 hours PRN Shortness of Breath and/or Wheezing  chlorproMAZINE    Tablet 50 milliGRAM(s) Oral every 6 hours PRN agitation/aggression  diphenhydrAMINE 25 milliGRAM(s) Oral every 6 hours PRN Combative behavior  LORazepam     Tablet 2 milliGRAM(s) Oral every 4 hours PRN Agitation/aggression  tiZANidine 2 milliGRAM(s) Oral <User Schedule> PRN Muscle Spasm    Allergies:  latex (Blisters)  morphine (Unknown)  penicillin (Hives)  penicillin (Other)  pineapple (Unknown)  Toradol (Rash)  Toradol (Unknown)  Zofran (Hives)  Zofran (Unknown)    Intolerances:      REVIEW OF SYSTEMS: As indicated above; otherwise, negative    VITAL SIGNS:  T(F): 99.1 (04-07-22 @ 14:51), Max: 99.1 (04-07-22 @ 14:51)  HR: 100 (04-07-22 @ 14:51) (98 - 103)  BP: 104/82 (04-07-22 @ 14:51) (98/75 - 117/79)  RR: 18 (04-07-22 @ 14:51) (17 - 18)  SpO2: 100% (04-07-22 @ 14:51) (100% - 100%)    PHYSICAL EXAM:  General: NAD, well-groomed, well-developed  Eyes: Conjunctiva and sclera clear  ENMT: Moist mucous membranes  Neck: No palpable pre-auricular, post-auricular, occipital, mandibular, submental, supra-clavicular, or infra-clavicular lymph nodes   Chest: Clear to auscultation bilaterally; no rales, rhonchi, or wheezing  Heart: Regular rate and rhythm; normal S1 and S2; no murmurs, rubs, or gallops  Abd: Soft, nontender, nondistended  Nervous System: AAOX3  Psych: Appropriate affect  Ext: no peripheral LE edema bilaterally    LABS:                        13.0   4.65  )-----------( 141      ( 07 Apr 2022 07:36 )             41.6     07 Apr 2022 07:36    136    |  103    |  10     ----------------------------<  96     4.9     |  16     |  0.82     Ca    9.2        07 Apr 2022 07:36  Phos  3.9       07 Apr 2022 07:36  Mg     2.00      07 Apr 2022 07:36    TPro  7.5    /  Alb  3.9    /  TBili  <0.2   /  DBili  x      /  AST  22     /  ALT  23     /  AlkPhos  53     07 Apr 2022 07:36    CAPILLARY BLOOD GLUCOSE      POCT Blood Glucose.: 94 mg/dL (07 Apr 2022 12:02)    BLOOD CULTURE  04-06 @ 12:41   <10,000 CFU/mL Normal Urogenital Samira  --  --    RADIOLOGY & ADDITIONAL TESTS:    Imaging Personally Reviewed:  [X ] YES     Consultant(s) Notes Reviewed:  Yes    Care Discussed with Consultants/Other Providers: Yes Patient is a 36y old  Female who presents with a chief complaint of B/l LE weakness (07 Apr 2022 07:44)     INTERVAL HPI/OVERNIGHT EVENTS:  - No acute events overnight  - Had seizure-like activity for which a RTT was called; however per nurse patient admitted she caused a scene because she was told she was going to      SUBJECTIVE  - Patient seen and evaluated at bedside  - Patient reports presence of mild nausea, burning sensation with urination  - Patient reports absence of fevers, chills, HA, lightheadedness, dizziness, nausea, emesis, chest pain, dyspnea, palpitations, abd pain, diarrhea, urinary symptoms, skin color changes or rashes, or LE edema     MEDICATIONS  (STANDING):  apixaban 5 milliGRAM(s) Oral every 12 hours  ARIPiprazole 30 milliGRAM(s) Oral daily  diVALproex  milliGRAM(s) Oral daily  diVALproex DR 1000 milliGRAM(s) Oral at bedtime  hydrocortisone 1% Cream 1 Application(s) Topical once  levothyroxine 75 MICROGram(s) Oral daily  lurasidone 40 milliGRAM(s) Oral daily  melatonin 6 milliGRAM(s) Oral at bedtime  mometasone 220 MICROgram(s) Inhaler 1 Puff(s) Inhalation daily  pantoprazole    Tablet 40 milliGRAM(s) Oral before breakfast  QUEtiapine 200 milliGRAM(s) Oral <User Schedule>  QUEtiapine 200 milliGRAM(s) Oral <User Schedule>  QUEtiapine 50 milliGRAM(s) Oral <User Schedule>    MEDICATIONS  (PRN):  ALBUTerol    90 MICROgram(s) HFA Inhaler 2 Puff(s) Inhalation every 6 hours PRN Shortness of Breath and/or Wheezing  chlorproMAZINE    Tablet 50 milliGRAM(s) Oral every 6 hours PRN agitation/aggression  diphenhydrAMINE 25 milliGRAM(s) Oral every 6 hours PRN Combative behavior  LORazepam     Tablet 2 milliGRAM(s) Oral every 4 hours PRN Agitation/aggression  tiZANidine 2 milliGRAM(s) Oral <User Schedule> PRN Muscle Spasm    Allergies:  latex (Blisters)  morphine (Unknown)  penicillin (Hives)  penicillin (Other)  pineapple (Unknown)  Toradol (Rash)  Toradol (Unknown)  Zofran (Hives)  Zofran (Unknown)    Intolerances:      REVIEW OF SYSTEMS: As indicated above; otherwise, negative    VITAL SIGNS:  T(F): 99.1 (04-07-22 @ 14:51), Max: 99.1 (04-07-22 @ 14:51)  HR: 100 (04-07-22 @ 14:51) (98 - 103)  BP: 104/82 (04-07-22 @ 14:51) (98/75 - 117/79)  RR: 18 (04-07-22 @ 14:51) (17 - 18)  SpO2: 100% (04-07-22 @ 14:51) (100% - 100%)    PHYSICAL EXAM:  General: NAD  Eyes: Sclera clear  Chest: Clear to auscultation bilaterally; no rales, rhonchi, or wheezing  Heart: Regular rate and rhythm; normal S1 and S2; no murmurs, rubs, or gallops  Abd: Soft, nontender, nondistended  Nervous System: AAOX3  Psych: Appropriate affect  Ext: No appreciable peripheral LE edema bilaterally noted  Skin: Had a cheloid appearing lesion near L elbow     LABS:                        13.0   4.65  )-----------( 141      ( 07 Apr 2022 07:36 )             41.6     07 Apr 2022 07:36    136    |  103    |  10     ----------------------------<  96     4.9     |  16     |  0.82     Ca    9.2        07 Apr 2022 07:36  Phos  3.9       07 Apr 2022 07:36  Mg     2.00      07 Apr 2022 07:36    TPro  7.5    /  Alb  3.9    /  TBili  <0.2   /  DBili  x      /  AST  22     /  ALT  23     /  AlkPhos  53     07 Apr 2022 07:36    CAPILLARY BLOOD GLUCOSE      POCT Blood Glucose.: 94 mg/dL (07 Apr 2022 12:02)    BLOOD CULTURE  04-06 @ 12:41   <10,000 CFU/mL Normal Urogenital Samira  --  --    RADIOLOGY & ADDITIONAL TESTS:    Imaging Personally Reviewed:  [X ] YES     Consultant(s) Notes Reviewed:  Yes    Care Discussed with Consultants/Other Providers: Yes

## 2022-04-07 NOTE — BH CONSULTATION LIAISON PROGRESS NOTE - NSBHINDICATION_PSY_ALL_CORE
aggression, impulsivity, elopement risk
impulsivity
aggression, impulsivity, elopement risk

## 2022-04-08 VITALS
SYSTOLIC BLOOD PRESSURE: 121 MMHG | DIASTOLIC BLOOD PRESSURE: 88 MMHG | OXYGEN SATURATION: 100 % | HEART RATE: 95 BPM | TEMPERATURE: 98 F | RESPIRATION RATE: 16 BRPM

## 2022-04-08 LAB
ALBUMIN SERPL ELPH-MCNC: 4 G/DL — SIGNIFICANT CHANGE UP (ref 3.3–5)
ALP SERPL-CCNC: 53 U/L — SIGNIFICANT CHANGE UP (ref 40–120)
ALT FLD-CCNC: 20 U/L — SIGNIFICANT CHANGE UP (ref 4–33)
ANION GAP SERPL CALC-SCNC: 15 MMOL/L — HIGH (ref 7–14)
APTT BLD: 21.4 SEC — LOW (ref 27–36.3)
AST SERPL-CCNC: 18 U/L — SIGNIFICANT CHANGE UP (ref 4–32)
B PERT DNA SPEC QL NAA+PROBE: SIGNIFICANT CHANGE UP
B PERT+PARAPERT DNA PNL SPEC NAA+PROBE: SIGNIFICANT CHANGE UP
BASOPHILS # BLD AUTO: 0.02 K/UL — SIGNIFICANT CHANGE UP (ref 0–0.2)
BASOPHILS NFR BLD AUTO: 0.3 % — SIGNIFICANT CHANGE UP (ref 0–2)
BILIRUB SERPL-MCNC: <0.2 MG/DL — SIGNIFICANT CHANGE UP (ref 0.2–1.2)
BORDETELLA PARAPERTUSSIS (RAPRVP): SIGNIFICANT CHANGE UP
BUN SERPL-MCNC: 10 MG/DL — SIGNIFICANT CHANGE UP (ref 7–23)
C PNEUM DNA SPEC QL NAA+PROBE: SIGNIFICANT CHANGE UP
CALCIUM SERPL-MCNC: 9.1 MG/DL — SIGNIFICANT CHANGE UP (ref 8.4–10.5)
CHLORIDE SERPL-SCNC: 104 MMOL/L — SIGNIFICANT CHANGE UP (ref 98–107)
CO2 SERPL-SCNC: 18 MMOL/L — LOW (ref 22–31)
CREAT SERPL-MCNC: 0.8 MG/DL — SIGNIFICANT CHANGE UP (ref 0.5–1.3)
EGFR: 99 ML/MIN/1.73M2 — SIGNIFICANT CHANGE UP
EOSINOPHIL # BLD AUTO: 0.04 K/UL — SIGNIFICANT CHANGE UP (ref 0–0.5)
EOSINOPHIL NFR BLD AUTO: 0.6 % — SIGNIFICANT CHANGE UP (ref 0–6)
FLUAV SUBTYP SPEC NAA+PROBE: SIGNIFICANT CHANGE UP
FLUBV RNA SPEC QL NAA+PROBE: SIGNIFICANT CHANGE UP
GLUCOSE BLDC GLUCOMTR-MCNC: 129 MG/DL — HIGH (ref 70–99)
GLUCOSE BLDC GLUCOMTR-MCNC: 85 MG/DL — SIGNIFICANT CHANGE UP (ref 70–99)
GLUCOSE BLDC GLUCOMTR-MCNC: 89 MG/DL — SIGNIFICANT CHANGE UP (ref 70–99)
GLUCOSE BLDC GLUCOMTR-MCNC: 93 MG/DL — SIGNIFICANT CHANGE UP (ref 70–99)
GLUCOSE BLDC GLUCOMTR-MCNC: 99 MG/DL — SIGNIFICANT CHANGE UP (ref 70–99)
GLUCOSE SERPL-MCNC: 106 MG/DL — HIGH (ref 70–99)
HADV DNA SPEC QL NAA+PROBE: SIGNIFICANT CHANGE UP
HCOV 229E RNA SPEC QL NAA+PROBE: SIGNIFICANT CHANGE UP
HCOV HKU1 RNA SPEC QL NAA+PROBE: SIGNIFICANT CHANGE UP
HCOV NL63 RNA SPEC QL NAA+PROBE: SIGNIFICANT CHANGE UP
HCOV OC43 RNA SPEC QL NAA+PROBE: SIGNIFICANT CHANGE UP
HCT VFR BLD CALC: 34.4 % — LOW (ref 34.5–45)
HGB BLD-MCNC: 11 G/DL — LOW (ref 11.5–15.5)
HMPV RNA SPEC QL NAA+PROBE: SIGNIFICANT CHANGE UP
HPIV1 RNA SPEC QL NAA+PROBE: SIGNIFICANT CHANGE UP
HPIV2 RNA SPEC QL NAA+PROBE: SIGNIFICANT CHANGE UP
HPIV3 RNA SPEC QL NAA+PROBE: SIGNIFICANT CHANGE UP
HPIV4 RNA SPEC QL NAA+PROBE: SIGNIFICANT CHANGE UP
IANC: 4.21 K/UL — SIGNIFICANT CHANGE UP (ref 1.8–7.4)
IMM GRANULOCYTES NFR BLD AUTO: 0.3 % — SIGNIFICANT CHANGE UP (ref 0–1.5)
INR BLD: 1.14 RATIO — SIGNIFICANT CHANGE UP (ref 0.88–1.16)
LIDOCAIN IGE QN: 26 U/L — SIGNIFICANT CHANGE UP (ref 7–60)
LYMPHOCYTES # BLD AUTO: 1.21 K/UL — SIGNIFICANT CHANGE UP (ref 1–3.3)
LYMPHOCYTES # BLD AUTO: 19.4 % — SIGNIFICANT CHANGE UP (ref 13–44)
M PNEUMO DNA SPEC QL NAA+PROBE: SIGNIFICANT CHANGE UP
MAGNESIUM SERPL-MCNC: 1.9 MG/DL — SIGNIFICANT CHANGE UP (ref 1.6–2.6)
MCHC RBC-ENTMCNC: 29.8 PG — SIGNIFICANT CHANGE UP (ref 27–34)
MCHC RBC-ENTMCNC: 32 GM/DL — SIGNIFICANT CHANGE UP (ref 32–36)
MCV RBC AUTO: 93.2 FL — SIGNIFICANT CHANGE UP (ref 80–100)
MONOCYTES # BLD AUTO: 0.75 K/UL — SIGNIFICANT CHANGE UP (ref 0–0.9)
MONOCYTES NFR BLD AUTO: 12 % — SIGNIFICANT CHANGE UP (ref 2–14)
NEUTROPHILS # BLD AUTO: 4.21 K/UL — SIGNIFICANT CHANGE UP (ref 1.8–7.4)
NEUTROPHILS NFR BLD AUTO: 67.4 % — SIGNIFICANT CHANGE UP (ref 43–77)
NRBC # BLD: 0 /100 WBCS — SIGNIFICANT CHANGE UP
NRBC # FLD: 0 K/UL — SIGNIFICANT CHANGE UP
PHOSPHATE SERPL-MCNC: 3.2 MG/DL — SIGNIFICANT CHANGE UP (ref 2.5–4.5)
PLATELET # BLD AUTO: 127 K/UL — LOW (ref 150–400)
POTASSIUM SERPL-MCNC: 4.5 MMOL/L — SIGNIFICANT CHANGE UP (ref 3.5–5.3)
POTASSIUM SERPL-SCNC: 4.5 MMOL/L — SIGNIFICANT CHANGE UP (ref 3.5–5.3)
PROT SERPL-MCNC: 7.1 G/DL — SIGNIFICANT CHANGE UP (ref 6–8.3)
PROTHROM AB SERPL-ACNC: 13.3 SEC — SIGNIFICANT CHANGE UP (ref 10.5–13.4)
RAPID RVP RESULT: SIGNIFICANT CHANGE UP
RBC # BLD: 3.69 M/UL — LOW (ref 3.8–5.2)
RBC # FLD: 15 % — HIGH (ref 10.3–14.5)
RSV RNA SPEC QL NAA+PROBE: SIGNIFICANT CHANGE UP
RV+EV RNA SPEC QL NAA+PROBE: SIGNIFICANT CHANGE UP
SARS-COV-2 RNA SPEC QL NAA+PROBE: SIGNIFICANT CHANGE UP
SODIUM SERPL-SCNC: 137 MMOL/L — SIGNIFICANT CHANGE UP (ref 135–145)
VALPROATE SERPL-MCNC: 77.5 UG/ML — SIGNIFICANT CHANGE UP (ref 50–100)
WBC # BLD: 6.25 K/UL — SIGNIFICANT CHANGE UP (ref 3.8–10.5)
WBC # FLD AUTO: 6.25 K/UL — SIGNIFICANT CHANGE UP (ref 3.8–10.5)

## 2022-04-08 RX ORDER — SODIUM CHLORIDE 9 MG/ML
1000 INJECTION, SOLUTION INTRAVENOUS ONCE
Refills: 0 | Status: DISCONTINUED | OUTPATIENT
Start: 2022-04-08 | End: 2022-04-08

## 2022-04-08 RX ADMIN — Medication 0.5 MILLIGRAM(S): at 00:43

## 2022-04-08 NOTE — ED ADULT NURSE NOTE - CHIEF COMPLAINT QUOTE
states "she has not been feeling well for a couple of days" pt c/o nausea and weakness. dc from [facility] today at noon. hx: seizures, intellectual disability, schizoaffective vs. bipolar, personality d/o.

## 2022-04-08 NOTE — ED POST DISCHARGE NOTE - RESULT SUMMARY
JORGE L Parekh: Received a call from Courtney from [employer] stating pt did not receive her dc papers. Pt from [facility]. DC papers faxed to facility at [phone]

## 2022-04-08 NOTE — ED PROVIDER NOTE - PATIENT PORTAL LINK FT
You can access the FollowMyHealth Patient Portal offered by [facility] by registering at the following website: [email or web address]. By joining [employer]’s FollowMyHealth portal, you will also be able to view your health information using other applications (apps) compatible with our system.

## 2022-04-08 NOTE — PROVIDER CONTACT NOTE (OTHER) - ASSESSMENT
Pt returned home from recent admission at around 7pm. Pt had d/c paperwork with her that was also faxed/reviewed prior to pt returning by nursing director. Pt however upon returning was requesting 2 Benadryl tabs and 1 Ativan which was not part of d.c medication regimen. Pt insisted she received this and became upset and became upset when told no by staff. Pt was then said to have went upstairs and called the police. Pt otherwise was said to be “okay”. No SI/HI intent or plan. No violence or aggression.     Pt is cleared for discharge by MD. Group home staff aware. Mode of transport for discharge reported to be INDEPENDENTLY via AMBULANCE for safety. Transportation arranged with [employer] due to pt having [employer] insurance at [phone]. Writer spoke with Nilda of [employer] who arranged trip #[id number]. Trip later confirmed to be set up with [facility] ([phone]) with trip #[id number] and p/up at 2am.

## 2022-04-08 NOTE — ED PROVIDER NOTE - OBJECTIVE STATEMENT
36 year old female with PMH Factor V Leiden deficiency, DVT/PE on Eliquis, seizure disorder, asthma, intellectual disability, extensive psychiatric history (schizoaffective vs. bipolar vs. personality disorder) presents with nausea and generalized weakness x "few days." Pt recently admitted to [facility], seen by neurology for lower extremity weakness without neurologic etiology. Pt discharged 8 hours ago to group [place], states she ate dinner and had "argument with a staff member." Pt reports persistent nausea and generalized weakness. Denies any chest pain, shortness of breath, fevers, vomiting, diarrhea, bloody stools, black tarry stools, or rash. Denies any recent injury or trauma. Denies any additional complaints.

## 2022-04-08 NOTE — PROVIDER CONTACT NOTE (OTHER) - BACKGROUND
Writer spoke with Sigrid who provided number for Supervisor, Devi (SENIOR STAFF), at [phone]. Writer reached Devi who reported the following:

## 2022-04-08 NOTE — ED PROVIDER NOTE - PROGRESS NOTE DETAILS
Tanner PINA: Per SW collateral from [facility], pt arrived around 7pm.  Tonight she was asking for benadryl and ativan, which were not listed on her med rec for PM/bedtime meds.  When told she would not get these medications, pt became upset and called 911 herself.  They have no acute medical or psychiatric concerns and will take the patient back.  Will have SW arrange for transportation.

## 2022-04-08 NOTE — PROVIDER CONTACT NOTE (OTHER) - SITUATION
Collateral and d/c planning requested by Dr. Hart. Pt arrives from group home. Writer contacted [facility] for collateral at [phone].

## 2022-04-08 NOTE — ED PROVIDER NOTE - CLINICAL SUMMARY MEDICAL DECISION MAKING FREE TEXT BOX
Brandi-PGY3: 36 year old female with PMH Factor V Leiden deficiency, DVT/PE on Eliquis, seizure disorder, asthma, intellectual disability, extensive psychiatric history (schizoaffective vs. bipolar vs. personality disorder) presents with nausea and generalized weakness x "few days." Pt recently admitted to [facility], seen by neurology for lower extremity weakness without neurologic etiology. Pt discharged 8 hours ago to group home, states she ate dinner and had "argument with a staff member." Pt reports persistent nausea and generalized weakness. Denies any chest pain, shortness of breath, fevers, vomiting, diarrhea, bloody stools, black tarry stools, or rash. Denies any recent injury or trauma. Pt well appearing, tachycardic on exam, no focal neuro deficits, abd nontender. Pt tolerating PO intake without emesis. Will obtain labs r/o anemia r/o electrolyte abnormality, symptomatic treatment with dispo pending workup.

## 2022-04-08 NOTE — ED PROVIDER NOTE - SECONDARY DIAGNOSIS.
[de-identified] : We will try a course of physical therapy. Should he fail to improve an MRI would be warranted. Malingering

## 2022-04-08 NOTE — ED ADULT NURSE NOTE - OBJECTIVE STATEMENT
pt presents to ED, A&04 discharged from [facility] earlier pt presents to ED, A&04 complaining of nausea and genralized weakness x1 week. Patient was recently admitted to [facility] for lower extremity weakness. Respirations even and unlabored. Lung sounds clear with equal chest rise bilaterally. ABD is soft, non tender, non distended with normal active bowel sound. No complaints of chest pain, headache, dizziness, vomiting  SOB, fever, chills verbalized at this time. Labs sent, medicated as ordered. awaiting further results g

## 2022-04-08 NOTE — ED PROVIDER NOTE - PHYSICAL EXAMINATION
CONSTITUTIONAL: non-toxic, well appearing  SKIN: no rash, no petechiae.  EYES: pink conjunctiva, anicteric  ENT: tongue and uvular midline, no exudates, moist mucous membranes  NECK: Supple; no meningismus, no JVD  CARD: Tachycardic, regular rhythm, no murmurs, equal radial pulses bilaterally 2+  RESP: CTAB, no respiratory distress  ABD: Soft, non-tender, non-distended, no peritoneal signs  EXT: Normal ROM x4. No edema. No calf tenderness.  NEURO: Alert, oriented. Neuro exam nonfocal, equal strength bilaterally.  PSYCH: Cooperative, appropriate.

## 2022-04-08 NOTE — PROVIDER CONTACT NOTE (OTHER) - RECOMMENDATIONS
Group home staff aware of pt’s return. Verbal huddle occurred with team. Non emergent transportation form placed in chart.

## 2022-04-08 NOTE — ED PROVIDER NOTE - ATTENDING CONTRIBUTION TO CARE
37 y/o F with h/o Factor V Leiden deficiency, DVT/PE (on Eliquis), seizures vs pseudoseizures on depakote, asthma, endometriosis, intellectual disability, and extensive psychiatric illness (schizoaffective vs. bipolar personality d/o) here with generalized weakness.  Pt with recent hospitalization for similar sxs and discharged earlier this evening.  Pt states she went back to her group home, but she still feels weak.  She is unable to describe sxs.  No specific pain, fever, cough, sob, n/v.  She states that she feels "dehydrated" although she reports eating a normal dinner prior to arrival.  Well appearing, obese, lying in stretcher, awake and alert, nontoxic.  AF/VSS.  Lungs cta bl.  Cards nl S1/S2, RRR, no MRG.  Abd soft ntnd.  No pedal edema or calf tenderness.  No focal neuro deficits.  Will contact group home for collateral, ekg, basic labs - results from recent hospitalization reviewed, including normal blood work the morning of discharge on 4/7.  While pt was noted to be tachycardic in triage, HR self normalized by the time she arrived in her room.  There is no focal findings on exam, sxs are similar to previous visit and pt with extensive negative medical work-up.  Will plan to dc if all is stable. 35 y/o F with h/o Factor V Leiden deficiency, DVT/PE (on Eliquis), seizures vs pseudoseizures on depakote, asthma, endometriosis, intellectual disability, and extensive psychiatric illness (schizoaffective vs. bipolar personality d/o) here with generalized weakness.  Pt with recent hospitalization for similar sxs and discharged earlier this evening.  Pt states she went back to her group home, but she still feels weak.  She is unable to describe sxs.  No specific pain, fever, cough, sob, n/v.  She states that she feels "dehydrated" although she reports eating a normal dinner prior to arrival.  Well appearing, obese, lying in stretcher, awake and alert, nontoxic.  AF/VSS.  Lungs cta bl.  Cards nl S1/S2, RRR, no MRG.  Abd soft ntnd.  No pedal edema or calf tenderness.  No focal neuro deficits.  Will contact group home for collateral, ekg, basic labs - results from recent hospitalization reviewed, including normal blood work the morning of discharge on 4/7.  While pt was noted to be tachycardic in triage, HR self normalized by the time she arrived in her room.  There is no focal findings on exam, sxs are similar to previous visit and pt with extensive negative medical work-up and also had multiple code greys for agitation and behavioral disturbances and several instances of attempting to refuse discharge 2/2 feeling weak despite being seen moving well during admission.  Do not suspect any new issues since discharge, will plan to dc if all is normal. 35 y/o F with h/o Factor V Leiden deficiency, DVT/PE (on Eliquis), seizures vs pseudoseizures on depakote, asthma, endometriosis, intellectual disability, and extensive psychiatric illness (schizoaffective vs. bipolar personality d/o) here with generalized weakness.  Pt with recent hospitalization for similar sxs and discharged earlier this evening.  Pt states she went back to her group home, but she still feels weak.  She is unable to describe sxs.  No specific pain, fever, cough, sob, n/v.  She states that she feels "dehydrated" although she reports eating a normal dinner prior to arrival.  Well appearing, obese, lying in stretcher, awake and alert, nontoxic.  AF/VSS.  Lungs cta bl.  Cards nl S1/S2, RRR, no MRG.  Abd soft ntnd.  No pedal edema or calf tenderness.  No focal neuro deficits.  Will contact group home for collateral, ekg, basic labs - results from recent hospitalization reviewed, including normal blood work the morning of discharge on 4/7.  While pt was noted to be tachycardic in triage, HR self normalized by the time she arrived in her room.  There is no focal findings on exam, sxs are similar to previous visit and pt with extensive negative medical work-up and also had multiple code greys for agitation and behavioral disturbances and several instances of attempting to refuse discharge 2/2 feeling weak despite being seen moving well during admission.  Labs from day of discharge (<24hrs prior to this visit) reviewed and there are no concerning findings and I do not suspect there to be any acute changes in the interim to require repeat.  Do not suspect any new issues since discharge, will plan to dc back to group home.

## 2022-04-09 ENCOUNTER — EMERGENCY (EMERGENCY)
Facility: HOSPITAL | Age: 37
LOS: 1 days | Discharge: HOME | End: 2022-04-09
Attending: EMERGENCY MEDICINE | Admitting: EMERGENCY MEDICINE
Payer: MEDICARE

## 2022-04-09 VITALS
RESPIRATION RATE: 16 BRPM | SYSTOLIC BLOOD PRESSURE: 128 MMHG | HEART RATE: 94 BPM | DIASTOLIC BLOOD PRESSURE: 64 MMHG | OXYGEN SATURATION: 100 % | TEMPERATURE: 98 F

## 2022-04-09 VITALS
OXYGEN SATURATION: 100 % | HEART RATE: 108 BPM | HEIGHT: 68 IN | SYSTOLIC BLOOD PRESSURE: 123 MMHG | TEMPERATURE: 98 F | RESPIRATION RATE: 18 BRPM | DIASTOLIC BLOOD PRESSURE: 59 MMHG

## 2022-04-09 PROCEDURE — 99284 EMERGENCY DEPT VISIT MOD MDM: CPT

## 2022-04-09 RX ORDER — CYCLOBENZAPRINE HYDROCHLORIDE 10 MG/1
10 TABLET, FILM COATED ORAL ONCE
Refills: 0 | Status: COMPLETED | OUTPATIENT
Start: 2022-04-09 | End: 2022-04-09

## 2022-04-09 RX ADMIN — Medication 2 MILLIGRAM(S): at 07:50

## 2022-04-09 RX ADMIN — CYCLOBENZAPRINE HYDROCHLORIDE 10 MILLIGRAM(S): 10 TABLET, FILM COATED ORAL at 10:01

## 2022-04-09 NOTE — PROVIDER CONTACT NOTE (OTHER) - RECOMMENDATIONS
[facility] contacted back reported [facility] accepted trip for next available time of 11am.

## 2022-04-09 NOTE — ED PROVIDER NOTE - NSFOLLOWUPINSTRUCTIONS_ED_ALL_ED_FT
You were seen in the Emergency Room for rectal bleeding after taking your temperature rectally.     Please apply petroleum jelly to your rectal area for several days and avoid straining with pooping.    Please return to the Emergency Room if your symptoms change or worsen, such as persistent bleeding that is not resolving at home, fainting episodes, fevers, severe weakness or abdominal pains.    Please follow up with a general medicine doctor (PMD) routinely    If you need help making an appointment with a doctor or do not have your own PMD, you can call our referral line at [phone] to make an appointment with a general medicine doctor (PMD).

## 2022-04-09 NOTE — PROVIDER CONTACT NOTE (OTHER) - ASSESSMENT
Writer contacted [facility] at [phone]. Writer spoke with staff member, Maranda Matos, who was informed of pt's discharge. Pt to travel INDEPENDENTLY via AMBULANCE for safety. Writer contacted [facility] #[phone] for arrangement of AMBULANCE. Writer spoke with Nataly who provided reservation #[id number]. Trip pending with [facility]. Verbal huddle completed with interdisciplinary team.

## 2022-04-09 NOTE — ED ADULT NURSE REASSESSMENT NOTE - NS ED NURSE REASSESS COMMENT FT1
received report from obi doran/[facility]  awaits transport to group [facility]/ approx 11 am via [facility]   pt calm and cooperative

## 2022-04-09 NOTE — ED PROVIDER NOTE - NS ED ROS FT
Constitutional: + weakness +fatigue, no fevers or chills  Skin: No rash or pruritis  HEENT: No sore throat  Cardiovascular: No chest pain, no shortness of breath  Respiratory: No shortness of breath, no cough  Gastrointestinal: No abdominal pain, no nausea, no vomiting, no diarrhea, no constipation  Musculoskeletal: No joint pain  Genitourinary: No dysuria or hematuria  Neurological: No focal weakness or tingling

## 2022-04-09 NOTE — ED PROVIDER NOTE - PHYSICAL EXAMINATION
General: Patient alert in no apparent distress  Skin: Dry and intact  HEENT: Head atraumatic. Oral mucosa moist.   Eyes: Conjunctiva normal  Cardiac: Regular rhythm and rate. No pretibial edema b/l  Respiratory: Lungs clear b/l and symmetric. No respiratory distress. Able to speak in complete sentences.  Gastrointestinal: Abdomen soft, nondistended, nontender  Rectal exam (ALEYDA Chiu): SMALL anal fissure nonbleeding at 12 o'clock position. no hemmorhoids noted externally  Musculoskeletal: Moves all extremities spontaneously  Neurological: alert and oriented to person, place, and time  Psychiatric: Calm and cooperative

## 2022-04-09 NOTE — ED PROVIDER NOTE - CLINICAL SUMMARY MEDICAL DECISION MAKING FREE TEXT BOX
35yo F from group home with PMHX PE on eliquis, endometriosis, intellectual disability, seizures, factor 5 leiden, asthma, bipolar, frequent ED visits, recently in our ED yesterday for generalized weakness, had comprehensive labs with negative RVP and COVID swab performed and discharged, presenting to our ED today for continued symptoms with new BRBPR ~20cc total after taking her rectal temp 2x with temp of 99.6F.  EXAM with small nonbleeding anal fissure. a/p- BRBPR likely from taking rectal temp which caused fissure. provided patient with petroleum jelly and instructed to apply to rectal area and avoid rectal temps or straining with pooping. po ativan 2mg per patient request. will dc and call  for transportation

## 2022-04-09 NOTE — ED ADULT NURSE NOTE - CHIEF COMPLAINT QUOTE
Pt arrives from Group Home. Pt st" I have abd pains and blood coming from vagina comes and goes since Jan....now I notice blood from rectum tonight  ..I have no appetite. Fevers, chills also....I feel lighthead...." Hx of blood clot on Eloquis, siezures on depakote, asthma, htn, Schizo/bipolar, Mentally delayed.

## 2022-04-09 NOTE — ED ADULT TRIAGE NOTE - BRAND OF FIRST COVID-19 BOOSTER
Discussed with the patient the importance of good control of their blood sugar, blood pressure, cholesterol, diet, exercise, weight, and medication usage under the guidance of their diabetic doctor to prevent/halt diabetic retinopathy. Pf[employer]

## 2022-04-09 NOTE — PROVIDER CONTACT NOTE (OTHER) - BACKGROUND
Writer informed by MD that pt is medically cleared for discharge at this time. SW assistance in arrangement for pt transport back to senior living was requested.

## 2022-04-09 NOTE — ED ADULT TRIAGE NOTE - CHIEF COMPLAINT QUOTE
Pt arrives from Group Home. Pt st" I have abd pains and blood coming from vagina comes and goes since Jan....now I notice blood from rectum tonight  ..I have no appetite. Fevers, chills also....I feel lighthead...." Hx of blood clot on Eloquis, siezures on depakote, asthma, htn Pt arrives from Group Home. Pt st" I have abd pains and blood coming from vagina comes and goes since Jan....now I notice blood from rectum tonight  ..I have no appetite. Fevers, chills also....I feel lighthead...." Hx of blood clot on Eloquis, siezures on depakote, asthma, htn, Schizo/bipolar, Mentally delayed.

## 2022-04-09 NOTE — ED PROVIDER NOTE - PATIENT PORTAL LINK FT
You can access the FollowMyHealth Patient Portal offered by [facility] by registering at the following website: [email or web address]. By joining [facility]’s FollowMyHealth portal, you will also be able to view your health information using other applications (apps) compatible with our system.

## 2022-04-09 NOTE — ED ADULT NURSE NOTE - OBJECTIVE STATEMENT
Pt received to room 5. A&Ox4. Ambulatory at baseline. Pmh of bipolar disorder, explosive behavior, asthma, schizophrenia, and factor 5 leiden. Pt coming from nursing home ([facility] Pt discharged yesterday from ED c/o generalized weakness and was told labwork was not abnormal. Pt coming in today for continuing of symptoms and endorsing about 20cc's of bright red blood. Pt denies any history of hemorrhoids. Pt endorses taking rectal temp 2x with result of 99.6. Pt also endorsing straining with defecation and thinks the bleeding may be related to that. Pt denies chest pain, SOB, nausea, vomiting, diarrhea, headache, and dizziness. Pt vitally stable. Pt resp even unlabored, breath sounds clear anterior and posterior, abd soft nontender, no urinary symptoms noted by pt, pedal pulses 2+ bilaterally. Awaiting further orders from MD.

## 2022-04-09 NOTE — ED PROVIDER NOTE - OBJECTIVE STATEMENT
37yo F from group home with PMHX PE on eliquis, endometriosis, intellectual disability, seizures, factor 5 leiden, asthma, bipolar, frequent ED visits, recently in our ED yesterday for generalized weakness, had comprehensive labs with RVP performed with no acute abnormalities and discharged, presenting to our ED today for continued symptoms with new BRBPR ~20cc total after taking her rectal temp 2x with temp of 99.6F.  Admits to straining with defecation. Denies sob, cough, abd or chest pains.  LMP in 2020.

## 2022-04-10 ENCOUNTER — INPATIENT (INPATIENT)
Facility: HOSPITAL | Age: 37
LOS: 3 days | Discharge: HOME | End: 2022-04-14
Attending: INTERNAL MEDICINE | Admitting: INTERNAL MEDICINE
Payer: MEDICARE

## 2022-04-10 VITALS
WEIGHT: 253.09 LBS | SYSTOLIC BLOOD PRESSURE: 108 MMHG | HEART RATE: 106 BPM | HEIGHT: 68 IN | TEMPERATURE: 99 F | DIASTOLIC BLOOD PRESSURE: 76 MMHG | OXYGEN SATURATION: 99 % | RESPIRATION RATE: 18 BRPM

## 2022-04-10 DIAGNOSIS — K62.5 HEMORRHAGE OF ANUS AND RECTUM: ICD-10-CM

## 2022-04-10 DIAGNOSIS — R56.9 UNSPECIFIED CONVULSIONS: ICD-10-CM

## 2022-04-10 DIAGNOSIS — Z86.2 PERSONAL HISTORY OF DISEASES OF THE BLOOD AND BLOOD-FORMING ORGANS AND CERTAIN DISORDERS INVOLVING THE IMMUNE MECHANISM: ICD-10-CM

## 2022-04-10 DIAGNOSIS — F31.9 BIPOLAR DISORDER, UNSPECIFIED: ICD-10-CM

## 2022-04-10 DIAGNOSIS — I80.229 PHLEBITIS AND THROMBOPHLEBITIS OF UNSPECIFIED POPLITEAL VEIN: ICD-10-CM

## 2022-04-10 LAB
ALBUMIN SERPL ELPH-MCNC: 3.7 G/DL — SIGNIFICANT CHANGE UP (ref 3.3–5)
ALP SERPL-CCNC: 59 U/L — SIGNIFICANT CHANGE UP (ref 40–120)
ALT FLD-CCNC: 28 U/L — SIGNIFICANT CHANGE UP (ref 12–78)
ANION GAP SERPL CALC-SCNC: 4 MMOL/L — LOW (ref 5–17)
APTT BLD: 19.7 SEC — LOW (ref 27.5–35.5)
AST SERPL-CCNC: 31 U/L — SIGNIFICANT CHANGE UP (ref 15–37)
BASOPHILS # BLD AUTO: 0.02 K/UL — SIGNIFICANT CHANGE UP (ref 0–0.2)
BASOPHILS NFR BLD AUTO: 0.4 % — SIGNIFICANT CHANGE UP (ref 0–2)
BILIRUB SERPL-MCNC: 0.3 MG/DL — SIGNIFICANT CHANGE UP (ref 0.2–1.2)
BLD GP AB SCN SERPL QL: SIGNIFICANT CHANGE UP
BUN SERPL-MCNC: 15 MG/DL — SIGNIFICANT CHANGE UP (ref 7–23)
CALCIUM SERPL-MCNC: 9.5 MG/DL — SIGNIFICANT CHANGE UP (ref 8.5–10.1)
CHLORIDE SERPL-SCNC: 112 MMOL/L — HIGH (ref 96–108)
CO2 SERPL-SCNC: 25 MMOL/L — SIGNIFICANT CHANGE UP (ref 22–31)
CREAT SERPL-MCNC: 0.89 MG/DL — SIGNIFICANT CHANGE UP (ref 0.5–1.3)
EGFR: 87 ML/MIN/1.73M2 — SIGNIFICANT CHANGE UP
EOSINOPHIL # BLD AUTO: 0.03 K/UL — SIGNIFICANT CHANGE UP (ref 0–0.5)
EOSINOPHIL NFR BLD AUTO: 0.6 % — SIGNIFICANT CHANGE UP (ref 0–6)
FLUAV AG NPH QL: SIGNIFICANT CHANGE UP
FLUBV AG NPH QL: SIGNIFICANT CHANGE UP
GLUCOSE SERPL-MCNC: 81 MG/DL — SIGNIFICANT CHANGE UP (ref 70–99)
HCG SERPL-ACNC: <1 MIU/ML — SIGNIFICANT CHANGE UP
HCT VFR BLD CALC: 40 % — SIGNIFICANT CHANGE UP (ref 34.5–45)
HGB BLD-MCNC: 12.6 G/DL — SIGNIFICANT CHANGE UP (ref 11.5–15.5)
IMM GRANULOCYTES NFR BLD AUTO: 0.6 % — SIGNIFICANT CHANGE UP (ref 0–1.5)
INR BLD: 1.22 RATIO — HIGH (ref 0.88–1.16)
LYMPHOCYTES # BLD AUTO: 1.46 K/UL — SIGNIFICANT CHANGE UP (ref 1–3.3)
LYMPHOCYTES # BLD AUTO: 27.1 % — SIGNIFICANT CHANGE UP (ref 13–44)
MCHC RBC-ENTMCNC: 29.4 PG — SIGNIFICANT CHANGE UP (ref 27–34)
MCHC RBC-ENTMCNC: 31.5 G/DL — LOW (ref 32–36)
MCV RBC AUTO: 93.2 FL — SIGNIFICANT CHANGE UP (ref 80–100)
MONOCYTES # BLD AUTO: 0.51 K/UL — SIGNIFICANT CHANGE UP (ref 0–0.9)
MONOCYTES NFR BLD AUTO: 9.5 % — SIGNIFICANT CHANGE UP (ref 2–14)
NEUTROPHILS # BLD AUTO: 3.33 K/UL — SIGNIFICANT CHANGE UP (ref 1.8–7.4)
NEUTROPHILS NFR BLD AUTO: 61.8 % — SIGNIFICANT CHANGE UP (ref 43–77)
NRBC # BLD: 0 /100 WBCS — SIGNIFICANT CHANGE UP (ref 0–0)
OB PNL STL: POSITIVE
PLATELET # BLD AUTO: 106 K/UL — LOW (ref 150–400)
POTASSIUM SERPL-MCNC: 5.6 MMOL/L — HIGH (ref 3.5–5.3)
POTASSIUM SERPL-SCNC: 5.6 MMOL/L — HIGH (ref 3.5–5.3)
PROT SERPL-MCNC: 8.1 GM/DL — SIGNIFICANT CHANGE UP (ref 6–8.3)
PROTHROM AB SERPL-ACNC: 14.6 SEC — HIGH (ref 10.5–13.4)
RBC # BLD: 4.29 M/UL — SIGNIFICANT CHANGE UP (ref 3.8–5.2)
RBC # FLD: 14.9 % — HIGH (ref 10.3–14.5)
SARS-COV-2 RNA SPEC QL NAA+PROBE: SIGNIFICANT CHANGE UP
SODIUM SERPL-SCNC: 141 MMOL/L — SIGNIFICANT CHANGE UP (ref 135–145)
WBC # BLD: 5.38 K/UL — SIGNIFICANT CHANGE UP (ref 3.8–10.5)
WBC # FLD AUTO: 5.38 K/UL — SIGNIFICANT CHANGE UP (ref 3.8–10.5)

## 2022-04-10 PROCEDURE — 93010 ELECTROCARDIOGRAM REPORT: CPT

## 2022-04-10 PROCEDURE — 99285 EMERGENCY DEPT VISIT HI MDM: CPT

## 2022-04-10 PROCEDURE — 71045 X-RAY EXAM CHEST 1 VIEW: CPT | Mod: 26

## 2022-04-10 RX ORDER — ALBUTEROL 90 UG/1
2 AEROSOL, METERED ORAL EVERY 6 HOURS
Refills: 0 | Status: DISCONTINUED | OUTPATIENT
Start: 2022-04-10 | End: 2022-04-14

## 2022-04-10 RX ORDER — ARIPIPRAZOLE 15 MG/1
30 TABLET ORAL DAILY
Refills: 0 | Status: DISCONTINUED | OUTPATIENT
Start: 2022-04-10 | End: 2022-04-14

## 2022-04-10 RX ORDER — LEVOTHYROXINE SODIUM 125 MCG
75 TABLET ORAL DAILY
Refills: 0 | Status: DISCONTINUED | OUTPATIENT
Start: 2022-04-10 | End: 2022-04-14

## 2022-04-10 RX ORDER — LANOLIN ALCOHOL/MO/W.PET/CERES
6 CREAM (GRAM) TOPICAL AT BEDTIME
Refills: 0 | Status: DISCONTINUED | OUTPATIENT
Start: 2022-04-10 | End: 2022-04-14

## 2022-04-10 RX ORDER — QUETIAPINE FUMARATE 200 MG/1
200 TABLET, FILM COATED ORAL
Refills: 0 | Status: DISCONTINUED | OUTPATIENT
Start: 2022-04-10 | End: 2022-04-14

## 2022-04-10 RX ORDER — LURASIDONE HYDROCHLORIDE 40 MG/1
40 TABLET ORAL DAILY
Refills: 0 | Status: DISCONTINUED | OUTPATIENT
Start: 2022-04-10 | End: 2022-04-14

## 2022-04-10 RX ORDER — DIPHENHYDRAMINE HCL 50 MG
25 CAPSULE ORAL ONCE
Refills: 0 | Status: COMPLETED | OUTPATIENT
Start: 2022-04-10 | End: 2022-04-10

## 2022-04-10 RX ORDER — PANTOPRAZOLE SODIUM 20 MG/1
40 TABLET, DELAYED RELEASE ORAL ONCE
Refills: 0 | Status: COMPLETED | OUTPATIENT
Start: 2022-04-10 | End: 2022-04-10

## 2022-04-10 RX ORDER — APIXABAN 2.5 MG/1
5 TABLET, FILM COATED ORAL EVERY 12 HOURS
Refills: 0 | Status: DISCONTINUED | OUTPATIENT
Start: 2022-04-10 | End: 2022-04-14

## 2022-04-10 RX ORDER — CHLORPROMAZINE HCL 10 MG
50 TABLET ORAL EVERY 6 HOURS
Refills: 0 | Status: DISCONTINUED | OUTPATIENT
Start: 2022-04-10 | End: 2022-04-14

## 2022-04-10 RX ORDER — SODIUM CHLORIDE 9 MG/ML
1000 INJECTION INTRAMUSCULAR; INTRAVENOUS; SUBCUTANEOUS ONCE
Refills: 0 | Status: COMPLETED | OUTPATIENT
Start: 2022-04-10 | End: 2022-04-10

## 2022-04-10 RX ORDER — QUETIAPINE FUMARATE 200 MG/1
50 TABLET, FILM COATED ORAL
Refills: 0 | Status: DISCONTINUED | OUTPATIENT
Start: 2022-04-10 | End: 2022-04-14

## 2022-04-10 RX ORDER — DIVALPROEX SODIUM 500 MG/1
500 TABLET, DELAYED RELEASE ORAL
Refills: 0 | Status: DISCONTINUED | OUTPATIENT
Start: 2022-04-10 | End: 2022-04-14

## 2022-04-10 RX ADMIN — Medication 2 MILLIGRAM(S): at 14:15

## 2022-04-10 RX ADMIN — ARIPIPRAZOLE 30 MILLIGRAM(S): 15 TABLET ORAL at 17:00

## 2022-04-10 RX ADMIN — QUETIAPINE FUMARATE 200 MILLIGRAM(S): 200 TABLET, FILM COATED ORAL at 21:21

## 2022-04-10 RX ADMIN — Medication 25 MILLIGRAM(S): at 22:09

## 2022-04-10 RX ADMIN — DIVALPROEX SODIUM 500 MILLIGRAM(S): 500 TABLET, DELAYED RELEASE ORAL at 21:21

## 2022-04-10 RX ADMIN — PANTOPRAZOLE SODIUM 40 MILLIGRAM(S): 20 TABLET, DELAYED RELEASE ORAL at 15:17

## 2022-04-10 RX ADMIN — Medication 0.5 MILLIGRAM(S): at 16:59

## 2022-04-10 RX ADMIN — SODIUM CHLORIDE 1000 MILLILITER(S): 9 INJECTION INTRAMUSCULAR; INTRAVENOUS; SUBCUTANEOUS at 13:00

## 2022-04-10 RX ADMIN — Medication 6 MILLIGRAM(S): at 21:21

## 2022-04-10 RX ADMIN — Medication 50 MILLIGRAM(S): at 17:00

## 2022-04-10 NOTE — ED PROVIDER NOTE - CLINICAL SUMMARY MEDICAL DECISION MAKING FREE TEXT BOX
hx, exam, labs, pt has normal h/h today and no other associated symptoms cta abd/pelvis is cancelled. Pt has a recent hx of dvt pt needs to be on Eliquis and pt will be admitted for obvs h/h and vascular sx for possible ivc filter and gastroenterologist consult.

## 2022-04-10 NOTE — ED ADULT TRIAGE NOTE - CHIEF COMPLAINT QUOTE
Pt presents to the ED with c/o rectal bleeding +Abdominal , HX of asthma, HTn and bipolar, +BRBPR hx of factor 5

## 2022-04-10 NOTE — ED ADULT NURSE NOTE - OBJECTIVE STATEMENT
Pt BIBEMS from group home c/o PRBPR, mild SOB and lightheadedness X3 days. Rectal exam completed by Dr. Mazariegos, awaiting stool OB results. Pt take eliquis daily for chronic RLE DVT. Pt denies CP, N/V/D, dizziness, hematemesis, hematuria, painful urination, palpitations, weakness, numbness/tingling, fever/chills, cough. Cardiac and spo2 monitoring in place. NSR rhythm noted, spo2 99% on room air. 12 lead EKG completed. Respirations even and unlabored. NAD noted at this time.

## 2022-04-10 NOTE — ED ADULT NURSE NOTE - ED STAT RN HANDOFF DETAILS
Patient endorsed to RN Preet for admission to floor, nurse made aware pt is actively bleeding from rectal with no symptoms.

## 2022-04-10 NOTE — ED ADULT NURSE REASSESSMENT NOTE - NS ED NURSE REASSESS COMMENT FT1
Unable to place #20G peripheral IV X6 attempts total made by 3 different RNs. Pt needs large bore IV for CT abd/pelvis with IV contrast. Dr. Mazariegos notified. Dr. Worthington to come to bedside place an ultrasound guided IV. #22G IV placed to left hand by Lalo RN for meds, has hx of seizure. IVF infusing as per emar.

## 2022-04-10 NOTE — ED PROVIDER NOTE - OBJECTIVE STATEMENT
36 years old female here by ems c/o three days of rectal bleed dark red and black., Pt sts she was seen and treated at [facility] two days ago and was discharged. Pt denies recent hx of trauma, headache, dizziness, blurred visions, light sensitivities, focal/distal weakness or numbness, neck/back/calfs pain, cough, sob, chest pain, nausea, vomiting, fever, chills, abd pain, dysuria hematuria or vaginal spotting. Pt also denies harmful thoughts to her self or others. 36 years old female here by ems from a group home c/o three days of rectal bleed dark red and black., Pt sts she was seen and treated at [facility] two days ago and was discharged. Pt denies recent hx of trauma, headache, dizziness, blurred visions, light sensitivities, focal/distal weakness or numbness, neck/back/calfs pain, cough, sob, chest pain, nausea, vomiting, fever, chills, abd pain, dysuria hematuria or vaginal spotting. Pt also denies harmful thoughts to her self or others. 36 years old female here by ems from a group home c/o three days of rectal bleed dark red and black., Pt sts she was seen and treated at [facility] two days ago and was discharged. Pt denies recent hx of trauma, headache, dizziness, blurred visions, light sensitivities, focal/distal weakness or numbness, neck/back/calfs pain, cough, sob, chest pain, nausea, vomiting, fever, chills, abd pain, dysuria hematuria or vaginal spotting. Pt also denies harmful thoughts to her self or others. Pt also has a hx of PE taking Eliquis daily.

## 2022-04-10 NOTE — ED ADULT NURSE NOTE - NS ED NURSE LEVEL OF CONSCIOUSNESS ORIENTATION
See SIRS (systemic inflammatory response syndrome)     Oriented - self; Oriented - place; Oriented - time

## 2022-04-10 NOTE — ED PROVIDER NOTE - EYES, MLM
Clear bilaterally, pupils equal, round and reactive to light. no sclera icterus no photophobia bilaterally

## 2022-04-10 NOTE — H&P ADULT - NSHPPHYSICALEXAM_GEN_ALL_CORE
General: A/ox 3, No acute Distress  skin : Normal  Head. Sheila. No lacerations  Neck: Supple, NO JVD  Cardiac: S1 S2, No M/R/G  Pulmonary: CTAP, Breathing unlabored, No Rhonchi/Rales/Wheezing  Abdomen: Soft, Non -tender, +BS x 4 quads  Neuro: A/o x 3, No focal deficits. Normal Motor and sensory exam  Vascular: Normal distal pulses.  Extremities: . No rashes. No edema  Decubiti ; None

## 2022-04-10 NOTE — ED PROVIDER NOTE - CONSULTING PHYSICIAN
Dr. Grider gastroenterologist is notified DR. Mar vascular sx is notified for possible ivc filter placement

## 2022-04-10 NOTE — ED ADULT NURSE NOTE - NSICDXPASTMEDICALHX_GEN_ALL_CORE_FT
PAST MEDICAL HISTORY:  Asthma     Bipolar disorder     Endometriosis     Factor V Leiden     GERD (gastroesophageal reflux disease)     History of DVT in adulthood RLE on eliquis    Hypothyroid     Insomnia     Seasonal allergies     Seizure

## 2022-04-10 NOTE — H&P ADULT - NSHPREVIEWOFSYSTEMS_GEN_ALL_CORE
REVIEW OF SYSTEMS:    CONSTITUTIONAL: No weakness, fevers or chills  EYES/ENT: No visual changes;  No vertigo or throat pain   NECK: No pain or stiffness  RESPIRATORY: No cough, wheezing, hemoptysis; No shortness of breath  CARDIOVASCULAR: No chest pain or palpitations [ ] CHF [ ] MI [ ] CABG [ ]  GASTROINTESTINAL: No abdominal or epigastric pain. No nausea, vomiting, or hematemesis; No diarrhea or constipation. No melena or hematochezia. [ ]abdominal surgery [ ] prostrate problems   GENITOURINARY: No dysuria, frequency or hematuria   NEUROLOGICAL: No numbness or weakness. HAS hx of seizures  SKIN: No itching, burning, rashes, or lesions   All other review of systems is negative unless indicated above.

## 2022-04-10 NOTE — PATIENT PROFILE ADULT - DO YOU FEEL UNSAFE AT HOME, WORK, OR SCHOOL?
NOTIFICATION RETURN TO WORK / SCHOOL 
 
2/18/2019 3:30 PM 
 
Ms. Collette King [facility] [id number] To Whom It May Concern: 
 
Collette King is currently under the care of [facility]. She will return to work/school on: 02/22/2019 If there are questions or concerns please have the patient contact our office. Sincerely, Darrin Torres MD 
 
                                
 
 no

## 2022-04-10 NOTE — H&P ADULT - NSHPLABSRESULTS_GEN_ALL_CORE
12.6                 141  | 25   | 15           5.38  >-----------< 106     ------------------------< 81                    40.0                 5.6  | 112  | 0.89                                         Ca 9.5   Mg x     Ph x          depakote level-4/8- 77    < from: CT Head No Cont (04.01.22 @ 19:21) >    CC: [id number] EXAM:  CT BRAIN                          PROCEDURE DATE:  04/01/2022          INTERPRETATION:  CLINICAL INDICATION: Unsteady gait    TECHNIQUE: Axial CT scanning of the brain was obtained from the skull   base to the vertex without the administration of intravenous contrast.   Coronal and sagittal reformatted images were subsequently obtained.    COMPARISON: Brain CT 3/10/2022    FINDINGS:  There is no CT evidence of acute transcortical infarct.    There is no hydrocephalus, mass effect, or acute intracranial hemorrhage.   No extra-axial collection. Basal cisterns are patent.    The visualized paranasal sinuses and mastoid air cells are clear.    The calvarium is intact.    IMPRESSION:  No evidence of acute transcortical infarct, acute intracranial   hemorrhage, or mass effect.    < end of copied text >

## 2022-04-10 NOTE — H&P ADULT - HISTORY OF PRESENT ILLNESS
36 years old female here by ems from a group home c/o three days of rectal bleed dark red and black., Pt sts she was seen and treated at [facility] two days ago and was discharged. Pt denies recent hx of trauma, headache, dizziness, blurred visions, light sensitivities, focal/distal weakness or numbness, neck/back/calfs pain, cough, sob, chest pain, nausea, vomiting, fever, chills, abd pain, dysuria hematuria or vaginal spotting. Pt also denies harmful thoughts to her self or others. Pt also has a hx of PE taking Eliquis daily.   In the Ed patient has no  blood per rectum, but stool  occult blood is positive. HB/HCT is stable. GI  and vascualr recommended admission   Depakot level 77  afew days ago.    recent admission to [facility]  for dvt rt leg

## 2022-04-10 NOTE — PATIENT PROFILE ADULT - FALL HARM RISK - HARM RISK INTERVENTIONS

## 2022-04-10 NOTE — ED PROVIDER NOTE - CONSTITUTIONAL, MLM
normal... Well appearing, awake, alert, oriented to person, place, time/situation and in no apparent distress. Speaking in clear full sentences no nasal flaring no shoulders retractions no diaphoresis smiling appears very comfortable

## 2022-04-10 NOTE — H&P ADULT - ASSESSMENT
rectal bleeding   Intellectual disability/ Bipolar/ Personality disorder   factor V leiden deficiency   DVT

## 2022-04-11 LAB
ANION GAP SERPL CALC-SCNC: 5 MMOL/L — SIGNIFICANT CHANGE UP (ref 5–17)
BUN SERPL-MCNC: 13 MG/DL — SIGNIFICANT CHANGE UP (ref 7–23)
CALCIUM SERPL-MCNC: 8.6 MG/DL — SIGNIFICANT CHANGE UP (ref 8.5–10.1)
CHLORIDE SERPL-SCNC: 111 MMOL/L — HIGH (ref 96–108)
CO2 SERPL-SCNC: 24 MMOL/L — SIGNIFICANT CHANGE UP (ref 22–31)
CREAT SERPL-MCNC: 0.7 MG/DL — SIGNIFICANT CHANGE UP (ref 0.5–1.3)
EGFR: 116 ML/MIN/1.73M2 — SIGNIFICANT CHANGE UP
GLUCOSE SERPL-MCNC: 89 MG/DL — SIGNIFICANT CHANGE UP (ref 70–99)
HCT VFR BLD CALC: 37 % — SIGNIFICANT CHANGE UP (ref 34.5–45)
HGB BLD-MCNC: 11.6 G/DL — SIGNIFICANT CHANGE UP (ref 11.5–15.5)
MCHC RBC-ENTMCNC: 29.8 PG — SIGNIFICANT CHANGE UP (ref 27–34)
MCHC RBC-ENTMCNC: 31.4 G/DL — LOW (ref 32–36)
MCV RBC AUTO: 95.1 FL — SIGNIFICANT CHANGE UP (ref 80–100)
NRBC # BLD: 0 /100 WBCS — SIGNIFICANT CHANGE UP (ref 0–0)
PLATELET # BLD AUTO: 110 K/UL — LOW (ref 150–400)
POTASSIUM SERPL-MCNC: 4.5 MMOL/L — SIGNIFICANT CHANGE UP (ref 3.5–5.3)
POTASSIUM SERPL-SCNC: 4.5 MMOL/L — SIGNIFICANT CHANGE UP (ref 3.5–5.3)
RBC # BLD: 3.89 M/UL — SIGNIFICANT CHANGE UP (ref 3.8–5.2)
RBC # FLD: 15 % — HIGH (ref 10.3–14.5)
SODIUM SERPL-SCNC: 140 MMOL/L — SIGNIFICANT CHANGE UP (ref 135–145)
WBC # BLD: 4.51 K/UL — SIGNIFICANT CHANGE UP (ref 3.8–10.5)
WBC # FLD AUTO: 4.51 K/UL — SIGNIFICANT CHANGE UP (ref 3.8–10.5)

## 2022-04-11 RX ORDER — HYDROCORTISONE 1 %
1 OINTMENT (GRAM) TOPICAL AT BEDTIME
Refills: 0 | Status: DISCONTINUED | OUTPATIENT
Start: 2022-04-11 | End: 2022-04-14

## 2022-04-11 RX ORDER — DIPHENHYDRAMINE HCL 50 MG
25 CAPSULE ORAL ONCE
Refills: 0 | Status: COMPLETED | OUTPATIENT
Start: 2022-04-11 | End: 2022-04-11

## 2022-04-11 RX ADMIN — Medication 0.5 MILLIGRAM(S): at 11:47

## 2022-04-11 RX ADMIN — ARIPIPRAZOLE 30 MILLIGRAM(S): 15 TABLET ORAL at 11:49

## 2022-04-11 RX ADMIN — Medication 75 MICROGRAM(S): at 06:12

## 2022-04-11 RX ADMIN — Medication 50 MILLIGRAM(S): at 06:14

## 2022-04-11 RX ADMIN — QUETIAPINE FUMARATE 200 MILLIGRAM(S): 200 TABLET, FILM COATED ORAL at 22:07

## 2022-04-11 RX ADMIN — Medication 0.5 MILLIGRAM(S): at 19:02

## 2022-04-11 RX ADMIN — Medication 6 MILLIGRAM(S): at 22:07

## 2022-04-11 RX ADMIN — APIXABAN 5 MILLIGRAM(S): 2.5 TABLET, FILM COATED ORAL at 19:03

## 2022-04-11 RX ADMIN — Medication 50 MILLIGRAM(S): at 15:44

## 2022-04-11 RX ADMIN — QUETIAPINE FUMARATE 50 MILLIGRAM(S): 200 TABLET, FILM COATED ORAL at 11:50

## 2022-04-11 RX ADMIN — DIVALPROEX SODIUM 500 MILLIGRAM(S): 500 TABLET, DELAYED RELEASE ORAL at 22:07

## 2022-04-11 RX ADMIN — DIVALPROEX SODIUM 500 MILLIGRAM(S): 500 TABLET, DELAYED RELEASE ORAL at 11:45

## 2022-04-11 RX ADMIN — Medication 1 APPLICATION(S): at 22:07

## 2022-04-11 RX ADMIN — Medication 25 MILLIGRAM(S): at 20:57

## 2022-04-11 RX ADMIN — LURASIDONE HYDROCHLORIDE 40 MILLIGRAM(S): 40 TABLET ORAL at 11:49

## 2022-04-11 RX ADMIN — Medication 50 MILLIGRAM(S): at 23:55

## 2022-04-11 RX ADMIN — APIXABAN 5 MILLIGRAM(S): 2.5 TABLET, FILM COATED ORAL at 06:12

## 2022-04-11 NOTE — PHYSICAL THERAPY INITIAL EVALUATION ADULT - GAIT DEVIATIONS NOTED, PT EVAL
unsteady gait, assist needed with RW/decreased brandon/decreased step length/decreased stride length/decreased weight-shifting ability

## 2022-04-11 NOTE — PHYSICAL THERAPY INITIAL EVALUATION ADULT - HEALTH SCREEN CRITERIA
Return to the ER for worsening redness, worsening swelling, fever, severe abdominal pain, vomiting blood, new or worsening symptoms.  Follow-up with your doctor in 2-3 days for wound check.   yes

## 2022-04-11 NOTE — CONSULT NOTE ADULT - SUBJECTIVE AND OBJECTIVE BOX
Reason for Consultation: h/o Factor V leiden Mutation    HPI: Patient is a 36y Female seen on consultatioin for the evaluation and management of  h/o Factor V leiden Mutation    36 years old female here by ems from a group home c/o three days of rectal bleed dark red and black., Pt sts she was seen and treated at [facility] two days ago and was discharged. Pt denies recent hx of trauma, headache, dizziness, blurred visions, light sensitivities, focal/distal weakness or numbness, neck/back/calfs pain, cough, sob, chest pain, nausea, vomiting, fever, chills, abd pain, dysuria hematuria or vaginal spotting. Pt also denies harmful thoughts to her self or others. Pt also has a hx of PE taking Eliquis daily.   In the Ed patient has no  blood per rectum, but stool  occult blood is positive.    As per patient ho of at least 10 VTE's was diagnosed with Factor V leidne mutation at [facility], but now follows with a hematologist a in [place], but patient states she cannot remeber his name    PAST MEDICAL & SURGICAL HISTORY:  Asthma    Seizure    Factor V Leiden    Bipolar disorder    Endometriosis    History of DVT in adulthood  RLE on eliquis    Hypothyroid    Seasonal allergies    Insomnia    GERD (gastroesophageal reflux disease)    No significant past surgical history        MEDICATIONS  (STANDING):  apixaban 5 milliGRAM(s) Oral every 12 hours  ARIPiprazole 30 milliGRAM(s) Oral daily  diVALproex  milliGRAM(s) Oral <User Schedule>  hydrocortisone 2.5% Rectal Cream 1 Application(s) Rectal at bedtime  levothyroxine 75 MICROGram(s) Oral daily  LORazepam     Tablet 0.5 milliGRAM(s) Oral <User Schedule>  lurasidone 40 milliGRAM(s) Oral daily  melatonin 6 milliGRAM(s) Oral at bedtime  QUEtiapine 50 milliGRAM(s) Oral <User Schedule>  QUEtiapine 200 milliGRAM(s) Oral <User Schedule>    MEDICATIONS  (PRN):  ALBUTerol    90 MICROgram(s) HFA Inhaler 2 Puff(s) Inhalation every 6 hours PRN Shortness of Breath and/or Wheezing  chlorproMAZINE    Tablet 50 milliGRAM(s) Oral every 6 hours PRN agitation/aggression      Allergies    latex (Blisters)  morphine (Unknown)  penicillin (Hives)  penicillin (Other)  pineapple (Unknown)  Toradol (Rash)  Toradol (Unknown)  Zofran (Hives)  Zofran (Unknown)    Intolerances        SOCIAL HISTORY:    Smoking Status: no  Alcohol: no  Marital Status: no  Occupation: no    FAMILY HISTORY:  Family history of DVT (Mother)      Vital Signs Last 24 Hrs  T(C): 36.6 (11 Apr 2022 05:10), Max: 37.1 (10 Apr 2022 11:25)  T(F): 97.9 (11 Apr 2022 05:10), Max: 98.7 (10 Apr 2022 11:25)  HR: 81 (11 Apr 2022 05:10) (74 - 106)  BP: 104/71 (11 Apr 2022 05:10) (96/60 - 113/74)  BP(mean): --  RR: 17 (11 Apr 2022 05:10) (17 - 20)  SpO2: 98% (11 Apr 2022 05:10) (96% - 99%)    PHYSICAL EXAM:    general - AAO x 3  HEENT - No Icterus  CVS - RRR  RS - AE B/L  Abd - soft, NT  Ext - Pulses +        LABS:                        11.6   4.51  )-----------( 110      ( 11 Apr 2022 06:45 )             37.0     04-11    140  |  111<H>  |  13  ----------------------------<  89  4.5   |  24  |  0.70    Ca    8.6      11 Apr 2022 06:45    TPro  8.1  /  Alb  3.7  /  TBili  0.3  /  DBili  x   /  AST  31  /  ALT  28  /  AlkPhos  59  04-10    PT/INR - ( 10 Apr 2022 17:48 )   PT: 14.6 sec;   INR: 1.22 ratio         PTT - ( 10 Apr 2022 17:48 )  PTT:19.7 sec      Culture - Urine (collected 06 Apr 2022 12:41)  Source: Clean Catch Clean Catch (Midstream)  Final Report (07 Apr 2022 11:55):    <10,000 CFU/mL Normal Urogenital Samira    Culture - Urine (collected 01 Apr 2022 17:05)  Source: Clean Catch Clean Catch (Midstream)  Final Report (02 Apr 2022 19:11):    <10,000 CFU/mL Normal Urogenital Samira

## 2022-04-11 NOTE — PHYSICAL THERAPY INITIAL EVALUATION ADULT - ADDITIONAL COMMENTS
Per pt: Pt lives in a group home with an elevator and ramp/stairs to enter bldg. Unable to quantify number of steps. Pt was independent with RW and Independent  with ADLS. Pt reports generalized weakness.

## 2022-04-11 NOTE — CONSULT NOTE ADULT - PROBLEM SELECTOR RECOMMENDATION 9
- as per patient states has h/o at least 10 VTE inclding DVT's and PE  - was diagnosed with factor V Leiden Muation in 2016, was told by er hematologist needs to be on it life long  - Continue Eliquis if possible - watch for bleeding  - GI f/u for any work up if necessary  - Watch blood counts

## 2022-04-12 LAB
BLD GP AB SCN SERPL QL: SIGNIFICANT CHANGE UP
GLUCOSE BLDC GLUCOMTR-MCNC: 99 MG/DL — SIGNIFICANT CHANGE UP (ref 70–99)
HCT VFR BLD CALC: 36.4 % — SIGNIFICANT CHANGE UP (ref 34.5–45)
HGB BLD-MCNC: 11.8 G/DL — SIGNIFICANT CHANGE UP (ref 11.5–15.5)
LIDOCAIN IGE QN: 161 U/L — SIGNIFICANT CHANGE UP (ref 73–393)
MCHC RBC-ENTMCNC: 29.9 PG — SIGNIFICANT CHANGE UP (ref 27–34)
MCHC RBC-ENTMCNC: 32.4 G/DL — SIGNIFICANT CHANGE UP (ref 32–36)
MCV RBC AUTO: 92.4 FL — SIGNIFICANT CHANGE UP (ref 80–100)
NRBC # BLD: 0 /100 WBCS — SIGNIFICANT CHANGE UP (ref 0–0)
PLATELET # BLD AUTO: 128 K/UL — LOW (ref 150–400)
RBC # BLD: 3.94 M/UL — SIGNIFICANT CHANGE UP (ref 3.8–5.2)
RBC # FLD: 14.9 % — HIGH (ref 10.3–14.5)
WBC # BLD: 4.3 K/UL — SIGNIFICANT CHANGE UP (ref 3.8–10.5)
WBC # FLD AUTO: 4.3 K/UL — SIGNIFICANT CHANGE UP (ref 3.8–10.5)

## 2022-04-12 PROCEDURE — 93010 ELECTROCARDIOGRAM REPORT: CPT

## 2022-04-12 PROCEDURE — 76937 US GUIDE VASCULAR ACCESS: CPT | Mod: 26

## 2022-04-12 PROCEDURE — 36000 PLACE NEEDLE IN VEIN: CPT

## 2022-04-12 PROCEDURE — 75635 CT ANGIO ABDOMINAL ARTERIES: CPT | Mod: 26

## 2022-04-12 RX ORDER — IOHEXOL 300 MG/ML
30 INJECTION, SOLUTION INTRAVENOUS ONCE
Refills: 0 | Status: DISCONTINUED | OUTPATIENT
Start: 2022-04-12 | End: 2022-04-14

## 2022-04-12 RX ORDER — PANTOPRAZOLE SODIUM 20 MG/1
40 TABLET, DELAYED RELEASE ORAL
Refills: 0 | Status: DISCONTINUED | OUTPATIENT
Start: 2022-04-12 | End: 2022-04-14

## 2022-04-12 RX ORDER — SODIUM CHLORIDE 9 MG/ML
1000 INJECTION INTRAMUSCULAR; INTRAVENOUS; SUBCUTANEOUS ONCE
Refills: 0 | Status: COMPLETED | OUTPATIENT
Start: 2022-04-12 | End: 2022-04-12

## 2022-04-12 RX ORDER — DIPHENHYDRAMINE HCL 50 MG
25 CAPSULE ORAL ONCE
Refills: 0 | Status: COMPLETED | OUTPATIENT
Start: 2022-04-12 | End: 2022-04-12

## 2022-04-12 RX ADMIN — APIXABAN 5 MILLIGRAM(S): 2.5 TABLET, FILM COATED ORAL at 18:14

## 2022-04-12 RX ADMIN — Medication 6 MILLIGRAM(S): at 21:50

## 2022-04-12 RX ADMIN — DIVALPROEX SODIUM 500 MILLIGRAM(S): 500 TABLET, DELAYED RELEASE ORAL at 11:07

## 2022-04-12 RX ADMIN — Medication 1 APPLICATION(S): at 21:53

## 2022-04-12 RX ADMIN — SODIUM CHLORIDE 1000 MILLILITER(S): 9 INJECTION INTRAMUSCULAR; INTRAVENOUS; SUBCUTANEOUS at 09:50

## 2022-04-12 RX ADMIN — Medication 2 MILLIGRAM(S): at 14:40

## 2022-04-12 RX ADMIN — Medication 0.5 MILLIGRAM(S): at 11:07

## 2022-04-12 RX ADMIN — Medication 25 MILLIGRAM(S): at 13:33

## 2022-04-12 RX ADMIN — DIVALPROEX SODIUM 500 MILLIGRAM(S): 500 TABLET, DELAYED RELEASE ORAL at 21:50

## 2022-04-12 RX ADMIN — QUETIAPINE FUMARATE 50 MILLIGRAM(S): 200 TABLET, FILM COATED ORAL at 13:31

## 2022-04-12 RX ADMIN — QUETIAPINE FUMARATE 200 MILLIGRAM(S): 200 TABLET, FILM COATED ORAL at 08:16

## 2022-04-12 RX ADMIN — Medication 75 MICROGRAM(S): at 06:45

## 2022-04-12 RX ADMIN — Medication 50 MILLIGRAM(S): at 08:16

## 2022-04-12 RX ADMIN — APIXABAN 5 MILLIGRAM(S): 2.5 TABLET, FILM COATED ORAL at 06:45

## 2022-04-12 RX ADMIN — PANTOPRAZOLE SODIUM 40 MILLIGRAM(S): 20 TABLET, DELAYED RELEASE ORAL at 18:14

## 2022-04-12 RX ADMIN — ARIPIPRAZOLE 30 MILLIGRAM(S): 15 TABLET ORAL at 13:00

## 2022-04-12 RX ADMIN — QUETIAPINE FUMARATE 200 MILLIGRAM(S): 200 TABLET, FILM COATED ORAL at 21:50

## 2022-04-12 NOTE — PROGRESS NOTE ADULT - PROBLEM SELECTOR PLAN 1
- as per patient states has h/o at least 10 VTE inclding DVT's and PE  - was diagnosed with factor V Leiden Muation in 2016, was told by er hematologist needs to be on it life long  - Continue Eliquis if possible - watch for bleeding  - GI f/u for any work up if necessary  - Watch blood counts - as per patient states has h/o at least 10 VTE inclding DVT's and PE  - was diagnosed with factor V Leiden Muation in 2016, was told by her hematologist needs to be on it life long  - Continue Eliquis if possible - watch for bleeding  - GI f/u for any work up if necessary  - Watch blood counts  - patient denies bleeding today

## 2022-04-12 NOTE — RAPID RESPONSE TEAM SUMMARY - NSSITUATIONBACKGROUNDRRT_GEN_ALL_CORE
36 YOF with PMHx of DVT/Factor 5 deficiency on Eliquis, bipolar p/w rectal bleeding. Seen by GI likely hemorrhoidal. Today was to be discharged however was c/o abd pain and was tachy. CT abd/pelv with contrast was ordered to work up gi bleeding. H/H was stable. Pt was now returning from CT scan into room and became unresponsive. Clenched fists and closed eyes, was not responding to physical or verbal cues. Appeared pseudoseizure since she has a history of pseudoseizure in last admission. 2mg Ativan IVP was given. Pt now sitting in bed with eyes open.

## 2022-04-13 ENCOUNTER — TRANSCRIPTION ENCOUNTER (OUTPATIENT)
Age: 37
End: 2022-04-13

## 2022-04-13 LAB
ANION GAP SERPL CALC-SCNC: 5 MMOL/L — SIGNIFICANT CHANGE UP (ref 5–17)
BUN SERPL-MCNC: 10 MG/DL — SIGNIFICANT CHANGE UP (ref 7–23)
CALCIUM SERPL-MCNC: 9.9 MG/DL — SIGNIFICANT CHANGE UP (ref 8.5–10.1)
CHLORIDE SERPL-SCNC: 108 MMOL/L — SIGNIFICANT CHANGE UP (ref 96–108)
CO2 SERPL-SCNC: 28 MMOL/L — SIGNIFICANT CHANGE UP (ref 22–31)
CREAT SERPL-MCNC: 0.88 MG/DL — SIGNIFICANT CHANGE UP (ref 0.5–1.3)
EGFR: 88 ML/MIN/1.73M2 — SIGNIFICANT CHANGE UP
GLUCOSE SERPL-MCNC: 117 MG/DL — HIGH (ref 70–99)
HCT VFR BLD CALC: 43 % — SIGNIFICANT CHANGE UP (ref 34.5–45)
HGB BLD-MCNC: 13.8 G/DL — SIGNIFICANT CHANGE UP (ref 11.5–15.5)
MCHC RBC-ENTMCNC: 29.8 PG — SIGNIFICANT CHANGE UP (ref 27–34)
MCHC RBC-ENTMCNC: 32.1 G/DL — SIGNIFICANT CHANGE UP (ref 32–36)
MCV RBC AUTO: 92.9 FL — SIGNIFICANT CHANGE UP (ref 80–100)
NRBC # BLD: 0 /100 WBCS — SIGNIFICANT CHANGE UP (ref 0–0)
PLATELET # BLD AUTO: 154 K/UL — SIGNIFICANT CHANGE UP (ref 150–400)
POTASSIUM SERPL-MCNC: 3.7 MMOL/L — SIGNIFICANT CHANGE UP (ref 3.5–5.3)
POTASSIUM SERPL-SCNC: 3.7 MMOL/L — SIGNIFICANT CHANGE UP (ref 3.5–5.3)
RBC # BLD: 4.63 M/UL — SIGNIFICANT CHANGE UP (ref 3.8–5.2)
RBC # FLD: 14.9 % — HIGH (ref 10.3–14.5)
SODIUM SERPL-SCNC: 141 MMOL/L — SIGNIFICANT CHANGE UP (ref 135–145)
WBC # BLD: 4.68 K/UL — SIGNIFICANT CHANGE UP (ref 3.8–10.5)
WBC # FLD AUTO: 4.68 K/UL — SIGNIFICANT CHANGE UP (ref 3.8–10.5)

## 2022-04-13 PROCEDURE — 90792 PSYCH DIAG EVAL W/MED SRVCS: CPT

## 2022-04-13 RX ORDER — DIPHENHYDRAMINE HCL 50 MG
25 CAPSULE ORAL ONCE
Refills: 0 | Status: DISCONTINUED | OUTPATIENT
Start: 2022-04-13 | End: 2022-04-13

## 2022-04-13 RX ORDER — DIPHENHYDRAMINE HCL 50 MG
50 CAPSULE ORAL ONCE
Refills: 0 | Status: COMPLETED | OUTPATIENT
Start: 2022-04-13 | End: 2022-04-13

## 2022-04-13 RX ADMIN — Medication 50 MILLIGRAM(S): at 02:13

## 2022-04-13 RX ADMIN — Medication 50 MILLIGRAM(S): at 15:01

## 2022-04-13 RX ADMIN — Medication 6 MILLIGRAM(S): at 21:07

## 2022-04-13 RX ADMIN — Medication 0.5 MILLIGRAM(S): at 10:05

## 2022-04-13 RX ADMIN — APIXABAN 5 MILLIGRAM(S): 2.5 TABLET, FILM COATED ORAL at 05:38

## 2022-04-13 RX ADMIN — DIVALPROEX SODIUM 500 MILLIGRAM(S): 500 TABLET, DELAYED RELEASE ORAL at 21:07

## 2022-04-13 RX ADMIN — DIVALPROEX SODIUM 500 MILLIGRAM(S): 500 TABLET, DELAYED RELEASE ORAL at 10:05

## 2022-04-13 RX ADMIN — QUETIAPINE FUMARATE 200 MILLIGRAM(S): 200 TABLET, FILM COATED ORAL at 21:07

## 2022-04-13 RX ADMIN — APIXABAN 5 MILLIGRAM(S): 2.5 TABLET, FILM COATED ORAL at 17:05

## 2022-04-13 RX ADMIN — PANTOPRAZOLE SODIUM 40 MILLIGRAM(S): 20 TABLET, DELAYED RELEASE ORAL at 17:06

## 2022-04-13 RX ADMIN — ARIPIPRAZOLE 30 MILLIGRAM(S): 15 TABLET ORAL at 11:10

## 2022-04-13 RX ADMIN — Medication 1 APPLICATION(S): at 21:08

## 2022-04-13 RX ADMIN — Medication 50 MILLIGRAM(S): at 20:08

## 2022-04-13 RX ADMIN — PANTOPRAZOLE SODIUM 40 MILLIGRAM(S): 20 TABLET, DELAYED RELEASE ORAL at 05:38

## 2022-04-13 RX ADMIN — Medication 75 MICROGRAM(S): at 05:37

## 2022-04-13 RX ADMIN — QUETIAPINE FUMARATE 50 MILLIGRAM(S): 200 TABLET, FILM COATED ORAL at 11:09

## 2022-04-13 RX ADMIN — Medication 0.5 MILLIGRAM(S): at 16:25

## 2022-04-13 RX ADMIN — LURASIDONE HYDROCHLORIDE 40 MILLIGRAM(S): 40 TABLET ORAL at 11:12

## 2022-04-13 RX ADMIN — QUETIAPINE FUMARATE 200 MILLIGRAM(S): 200 TABLET, FILM COATED ORAL at 08:10

## 2022-04-13 RX ADMIN — Medication 50 MILLIGRAM(S): at 11:58

## 2022-04-13 NOTE — BH CONSULTATION LIAISON ASSESSMENT NOTE - CURRENT MEDICATION
MEDICATIONS  (STANDING):  apixaban 5 milliGRAM(s) Oral every 12 hours  ARIPiprazole 30 milliGRAM(s) Oral daily  diVALproex  milliGRAM(s) Oral <User Schedule>  hydrocortisone 2.5% Rectal Cream 1 Application(s) Rectal at bedtime  iohexol 300 mG (iodine)/mL Oral Solution 30 milliLiter(s) Oral once  levothyroxine 75 MICROGram(s) Oral daily  LORazepam     Tablet 0.5 milliGRAM(s) Oral <User Schedule>  lurasidone 40 milliGRAM(s) Oral daily  melatonin 6 milliGRAM(s) Oral at bedtime  pantoprazole  Injectable 40 milliGRAM(s) IV Push two times a day  QUEtiapine 50 milliGRAM(s) Oral <User Schedule>  QUEtiapine 200 milliGRAM(s) Oral <User Schedule>    MEDICATIONS  (PRN):  ALBUTerol    90 MICROgram(s) HFA Inhaler 2 Puff(s) Inhalation every 6 hours PRN Shortness of Breath and/or Wheezing  chlorproMAZINE    Tablet 50 milliGRAM(s) Oral every 6 hours PRN agitation/aggression

## 2022-04-13 NOTE — DISCHARGE NOTE PROVIDER - PROVIDER TOKENS
FREE:[LAST:[Primary Care Doctor],PHONE:[(   )    -],FAX:[(   )    -]],FREE:[LAST:[Gastroenterologist],PHONE:[(   )    -],FAX:[(   )    -]],FREE:[LAST:[Hematologist],PHONE:[(   )    -],FAX:[(   )    -]],FREE:[LAST:[Psychiatrist],PHONE:[(   )    -],FAX:[(   )    -]]

## 2022-04-13 NOTE — DISCHARGE NOTE PROVIDER - NSDCMRMEDTOKEN_GEN_ALL_CORE_FT
Ala-Maximo 1% topical cream: 1 application topically once  albuterol 90 mcg/inh inhalation aerosol: 2 puff(s) inhaled every 6 hours, As needed, Shortness of Breath and/or Wheezing  ARIPiprazole 30 mg oral tablet: 1 tab(s) orally once a day  Ativan 0.5 mg oral tablet: 1 tab(s) orally 2 times a day  [10AM and 4PM] MDD:1mg  chlorproMAZINE 50 mg oral tablet: 1 tab(s) orally every 6 hours, As needed, agitation/aggression  Claritin 10 mg oral tablet: 1 tab(s) orally once a day  Depakote  mg oral tablet, extended release: 3 tab(s) orally once a day 500 mg in AM and 1000mg bedtime  diphenhydrAMINE 25 mg oral capsule: 1 cap(s) orally every 6 hours, As needed, Rash and/or Itching  Eliquis 5 mg oral tablet: 1 tab(s) orally 2 times a day   Flovent 110 mcg/inh inhalation aerosol with adapter:   fluticasone 50 mcg/inh nasal spray: 1 spray(s) nasal 2 times a day  levothyroxine 75 mcg (0.075 mg) oral tablet: 1 tab(s) orally once a day  lurasidone 40 mg oral tablet: 1 tab(s) orally once a day  melatonin 3 mg oral tablet: 2 tab(s) orally once a day (at bedtime)  mometasone 220 mcg/inh inhalation aerosol powder: 1 puff(s) inhaled once a day  Multiple Vitamins with Iron oral tablet: 1 tab(s) orally once a day  olopatadine 0.1% ophthalmic solution: 1 drop(s) to each affected eye every 6 hours, As Needed  pantoprazole 40 mg oral delayed release tablet: 1 tab(s) orally once a day (before a meal)  QUEtiapine 50 mg oral tablet: 1 tab(s) orally once a day at noon  SEROquel 200 mg oral tablet: 1 tab(s) orally 2 times a day [8AM and 8PM]  tiZANidine 2 mg oral tablet: 1 tab(s) orally 2 times a day, As Needed -Muscle Spasm    Tylenol 325 mg oral tablet: 2 tab(s) orally every 6 hours, As Needed  Vitamin D2 1.25 mg (50,000 intl units) oral capsule: 1 cap(s) orally once a week

## 2022-04-13 NOTE — BH CONSULTATION LIAISON ASSESSMENT NOTE - NSBHREFERDETAILS_PSY_A_CORE_FT
agitation Headache Monitoring: I recommended monitoring the headaches for now. There is no evidence of increased intracranial pressure. They were instructed to call if the headaches are worsening.

## 2022-04-13 NOTE — BH CONSULTATION LIAISON ASSESSMENT NOTE - SUMMARY
Noted interval events since last seen involving aggressive, intentional behavior which is Pt's well known baseline and is NOT secondary to a primary psyhcotic or mood process but a manifestation of Patient's Axis II pathologies. No subsequent seizures; Patient's current presentation is the best Writer has seen her in this year. History involving aggressive, intentional behavior which is Pt's well known baseline and is NOT secondary to a primary psychotic or mood process but a manifestation of Patient's Axis II pathologies is likely to nonetheless occur. No subsequent seizures or BRBPR.

## 2022-04-13 NOTE — BH CONSULTATION LIAISON ASSESSMENT NOTE - VIOLENCE RISK FACTORS:
Can you please let patient know that her blood work including zinc, vitamin B12, iron was within normal range  Her magnesium was also within normal range however it was low normal   The range of magnesium is 1 6-2 6 and she was 1 7  I feel she would benefit from increasing magnesium rich foods in her diet and taking an extra magnesium supplement if she would like as well  In addition, her vitamin a level was higher than normal   Can you ask her if she is taking a supplement that has vitamin a  If she does, I would like her to discontinue this  I would like to recheck her vitamin a level in 1 month and will print this out    Thank you Affective dysregulation/Impulsivity/Lack of insight into violence risk/need for treatment/Irritability

## 2022-04-13 NOTE — PROGRESS NOTE ADULT - PROBLEM SELECTOR PLAN 1
- as per patient states has h/o at least 10 VTE inclding DVT's and PE  - was diagnosed with factor V Leiden Muation in 2016, was told by her hematologist needs to be on it life long  - Continue Eliquis if possible - watch for bleeding  - GI f/u for any work up if necessary  - Watch blood counts  - patient denies bleeding today

## 2022-04-13 NOTE — BH CONSULTATION LIAISON ASSESSMENT NOTE - NSBHCONSULTRECOMMENDOTHER_PSY_A_CORE FT
continue Seroquel 200mg @ 8am, 50mg @ 12pm, 200mg @ 8pm; Abilify 30mg daily, lurasidone 40mg po daily  depakote 500mg po in am and 1000 PO qhs, Lorazepam PO 0.5mg bid, thorazine 50mg q6h PRN,  Tizanidine 2mg bid PRN, Levothyroxine 75mg daily, Pantoprazole 40 mg AM  - thorazine 50mg IM q6hrs PRN for agitation instead of haldol with Ativan 2mg IM q6hrs PRN  - Patient has a legal guardian Maria Elena Kevin [phone]; Patient CANNOT refuse transfer back to [facility] home when medically stable   continue Seroquel 200mg @ 8am, 50mg @ 12pm, 200mg @ 8pm; Abilify 30mg daily, lurasidone 40mg po daily  depakote 500mg po in am and 1000 PO qhs, Lorazepam PO 0.5mg bid, thorazine 50mg q6h PRN,  Tizanidine 2mg bid PRN, Levothyroxine 75mg daily, Pantoprazole 40 mg AM  - thorazine 50mg IM q4hrs PRN for agitation with Ativan 2mg IM q4hrs PRN  - Patient has a legal guardian Maria Elena Brown [phone]; Patient CANNOT refuse transfer back to [facility] home when medically stable

## 2022-04-13 NOTE — DISCHARGE NOTE PROVIDER - CARE PROVIDER_API CALL
Primary Care Doctor,   Phone: (   )    -  Fax: (   )    -  Follow Up Time:     Gastroenterologist,   Phone: (   )    -  Fax: (   )    -  Follow Up Time:     Hematologist,   Phone: (   )    -  Fax: (   )    -  Follow Up Time:     Psychiatrist,   Phone: (   )    -  Fax: (   )    -  Follow Up Time:

## 2022-04-13 NOTE — BH CONSULTATION LIAISON ASSESSMENT NOTE - NSBHCHARTREVIEWVS_PSY_A_CORE FT
Vital Signs Last 24 Hrs  T(C): 37 (13 Apr 2022 12:02), Max: 37 (13 Apr 2022 12:02)  T(F): 98.6 (13 Apr 2022 12:02), Max: 98.6 (13 Apr 2022 12:02)  HR: 139 (13 Apr 2022 12:02) (90 - 139)  BP: 119/81 (13 Apr 2022 12:02) (89/60 - 128/80)  BP(mean): --  RR: 18 (13 Apr 2022 12:02) (17 - 18)  SpO2: 99% (13 Apr 2022 12:02) (96% - 99%)
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
## 2022-04-13 NOTE — BH CONSULTATION LIAISON ASSESSMENT NOTE - NSBHCHARTREVIEWLAB_PSY_A_CORE FT
03-29    142  |  113<H>  |  16  ----------------------------<  132<H>  4.4   |  22  |  0.69    Ca    9.1      29 Mar 2022 03:14  Phos  4.0     03-29  Mg     2.4     03-29    TPro  7.0  /  Alb  3.1<L>  /  TBili  0.2  /  DBili  x   /  AST  21  /  ALT  49  /  AlkPhos  49  03-29   04-13    141  |  108  |  10  ----------------------------<  117<H>  3.7   |  28  |  0.88    Ca    9.9      13 Apr 2022 11:28

## 2022-04-13 NOTE — DISCHARGE NOTE PROVIDER - HOSPITAL COURSE
37 yo female with pmhx DVT on Eliquis ( Factor V Leiden deficiency), Seizures and bipolar disorder admitted to medicine on 4/10/2022 for rectal bleeding. Patient was seen by GI and they stated it is likely hemorrhoids. H/H stable upon admission. Patient had a CT done dt onset of abdominal pain which was negative. H/H remained stable. Patient was seen by heme/onc and it was recommended to stay on Eliquis dt hx of VTE/ DVT/ PE and to monitor for bleeding. vitals stable, Hb/HCt stable. RRT was called on 4/12/22  for concern for seizure.  Pt was returning from CT scan into room and became unresponsive. Clenched fists and closed eyes, was not responding to physical or verbal cues. Appeared pseudoseizure since she has a history of pseudoseizure in last admission. 2mg Ativan IVP was given. Pt now sitting in bed with eyes open. Patient was medically cleared for D/C. Patient 35 yo female with pmhx DVT on Eliquis ( Factor V Leiden deficiency), Seizures and bipolar disorder admitted to medicine on 4/10/2022 for rectal bleeding. Patient was seen by GI and they stated it is likely hemorrhoids. H/H stable upon admission. Patient had a CT done dt onset of abdominal pain which was negative. H/H remained stable. Patient was seen by heme/onc and it was recommended to stay on Eliquis dt hx of VTE/ DVT/ PE and to monitor for bleeding. vitals stable, Hb/HCt stable. RRT was called on 4/12/22  for concern for seizure, as Pt was returning from CT scan into room and became unresponsive. Clenched fists and closed eyes, was not responding to physical or verbal cues. Appeared pseudoseizure since she has a history of pseudoseizure in last admission. Prior to discharge Patient was afebrile, hemodynamically stable, saturating well on RA and without further episodes of rectal bleeding, and patient is medically cleared for D/C. Patient seen by Psychiatry, currently denies any SI/HI, denies and does not manifest any symptoms of hypomania/brit/psychosis/depression/anxiety. Patient with outpatient Psych follow up, with recommendations to maintain current medication regimen.

## 2022-04-13 NOTE — DISCHARGE NOTE PROVIDER - NSDCFUADDAPPT_GEN_ALL_CORE_FT
It is important to see your primary physician as well as the physicians noted below within the next week to perform a comprehensive medical review.  Call their offices for an appointment as soon as you leave the hospital.  You will also need to see them for renewal of your medications.  If you do not have a primary physician, contact the [facility] Physician Referral Service at [phone].  To obtain your results, you can access the [email or web address] Patient Portal at [email or web address].  Your medical issues appear to be stable at this time, but if your symptoms recur or worsen, contact your physicians and/or return to the hospital if necessary.  If you encounter any issues or questions with your medication, call your physicians before stopping the medication.

## 2022-04-14 ENCOUNTER — TRANSCRIPTION ENCOUNTER (OUTPATIENT)
Age: 37
End: 2022-04-14

## 2022-04-14 VITALS
OXYGEN SATURATION: 100 % | DIASTOLIC BLOOD PRESSURE: 80 MMHG | HEART RATE: 110 BPM | TEMPERATURE: 98 F | SYSTOLIC BLOOD PRESSURE: 130 MMHG | RESPIRATION RATE: 18 BRPM

## 2022-04-14 RX ORDER — QUETIAPINE FUMARATE 200 MG/1
50 TABLET, FILM COATED ORAL ONCE
Refills: 0 | Status: COMPLETED | OUTPATIENT
Start: 2022-04-14 | End: 2022-04-14

## 2022-04-14 RX ORDER — CHLORPROMAZINE HCL 10 MG
50 TABLET ORAL ONCE
Refills: 0 | Status: COMPLETED | OUTPATIENT
Start: 2022-04-14 | End: 2022-04-14

## 2022-04-14 RX ADMIN — Medication 50 MILLIGRAM(S): at 10:58

## 2022-04-14 RX ADMIN — LURASIDONE HYDROCHLORIDE 40 MILLIGRAM(S): 40 TABLET ORAL at 11:14

## 2022-04-14 RX ADMIN — APIXABAN 5 MILLIGRAM(S): 2.5 TABLET, FILM COATED ORAL at 05:35

## 2022-04-14 RX ADMIN — QUETIAPINE FUMARATE 50 MILLIGRAM(S): 200 TABLET, FILM COATED ORAL at 11:14

## 2022-04-14 RX ADMIN — Medication 0.5 MILLIGRAM(S): at 09:58

## 2022-04-14 RX ADMIN — QUETIAPINE FUMARATE 200 MILLIGRAM(S): 200 TABLET, FILM COATED ORAL at 08:05

## 2022-04-14 RX ADMIN — Medication 75 MICROGRAM(S): at 05:35

## 2022-04-14 RX ADMIN — Medication 50 MILLIGRAM(S): at 14:00

## 2022-04-14 RX ADMIN — ARIPIPRAZOLE 30 MILLIGRAM(S): 15 TABLET ORAL at 11:13

## 2022-04-14 RX ADMIN — DIVALPROEX SODIUM 500 MILLIGRAM(S): 500 TABLET, DELAYED RELEASE ORAL at 09:55

## 2022-04-14 RX ADMIN — PANTOPRAZOLE SODIUM 40 MILLIGRAM(S): 20 TABLET, DELAYED RELEASE ORAL at 05:35

## 2022-04-14 RX ADMIN — QUETIAPINE FUMARATE 50 MILLIGRAM(S): 200 TABLET, FILM COATED ORAL at 14:00

## 2022-04-14 NOTE — DISCHARGE NOTE NURSING/CASE MANAGEMENT/SOCIAL WORK - BRAND OF COVID-19 VACCINATION
[facility] Radiation Treatment Management Note 26-30    Patient:  Rei Conroy  Age:  81year old  Visit Diagnosis:    1. Prostate cancer [facility])      Primary Rad/Onc:  Dr. Shweta Valladares    Site Delivered Dose (cGy) Prescribed Dose (cGy) Fraction #   PROSTATE SIB [id number] 7000 26/28     First treatment date:   1/12/22  Concurrent chemotherapy:  N/A    Oncology Vitals 2/3/2022 2/10/2022 2/17/2022   Weight 201 lb 6.4 oz 200 lb 3.2 oz 203 lb   Weight 91.354 kg 90.81 kg 92.08 kg   BSA (m2) 1.99 m2 1.98 m2 1.99 m2   /71 116/60 127/71   Pulse 94 91 83   Resp 16 20 20   Temp 97.8 97.3 97.8   SpO2 96 96 96   Pain Score 0 0 0   Some recent data might be hidden        Toxicities:  Fatigue Grade 1= Fatigue relieved by rest  Constipation Grade 0= None  Diarrhea  Grade 0= None  Dysuria on urination Grade 0= None  Urgency on urination Grade 0= None  Frequency on urinationGrade 0= None  Urine stream strength Moderate  Urine Incontinence Grade 0= None  Nocturia Grade 1= Present    Nursing Note:  Pt to finish RT on 2/21, AVS to give and review. Feels well, appetite doing well. No c/o bowel or bladder issues. Cont to take Flomax daily. To f-up with Dr. Pato Noe early March. Carola Toledo, RN    Physician Note:  Subjective:  Doing well  1 flomax daily      Objective:  No changes      Treatment setup imaging have been reviewed:   Yes    Assessment/Plan:  After RT, f/u [facility]  F/u here in June      Dr. Shweta Valladares Sanjiv

## 2022-04-14 NOTE — DISCHARGE NOTE NURSING/CASE MANAGEMENT/SOCIAL WORK - NSDCFUADDAPPT_GEN_ALL_CORE_FT
It is important to see your primary physician as well as the physicians noted below within the next week to perform a comprehensive medical review.  Call their offices for an appointment as soon as you leave the hospital.  You will also need to see them for renewal of your medications.  If you do not have a primary physician, contact the [facility] Physician Referral Service at [phone].  To obtain your results, you can access the [facility] Patient Portal at [email or web address].  Your medical issues appear to be stable at this time, but if your symptoms recur or worsen, contact your physicians and/or return to the hospital if necessary.  If you encounter any issues or questions with your medication, call your physicians before stopping the medication.

## 2022-04-14 NOTE — DISCHARGE NOTE NURSING/CASE MANAGEMENT/SOCIAL WORK - NSDCPEFALRISK_GEN_ALL_CORE
For information on Fall & Injury Prevention, visit: [email or web address] OR  [email or web address] OR  https://www.cdc.gov/steadi/patient.html

## 2022-04-14 NOTE — PROGRESS NOTE ADULT - PROVIDER SPECIALTY LIST ADULT
Gastroenterology
Internal Medicine
Internal Medicine
Heme/Onc
Internal Medicine
Heme/Onc
Heme/Onc
Internal Medicine

## 2022-04-14 NOTE — PROGRESS NOTE ADULT - ASSESSMENT
Factor V leiden mutation
itching ? allergic reaction to what?   rectal bleeding on admission- resolved' abd pain- will get abd ct  bipolar disorder   thrombophilia- factor V Leiden deficiency   conservative management    
rectal bleed   bipolar disorder   thrombophilia- factor V Leiden deficiency   conservative management
Factor V Leiden Deficecny
Factor V leiden mutation
medically stable patient with behavioral health  issues.  bipolar disorder   thrombophilia- factor V Leiden deficiency   conservative management  
medically stable patient with behavioral health  issues.  bipolar disorder   thrombophilia- factor V Leiden deficiency   Left Ovarian cyst. patient made aware , needs Op GYN follow up. with gynecologist.   conservative management

## 2022-04-14 NOTE — PROGRESS NOTE ADULT - PROBLEM SELECTOR PLAN 1
- on Eliquis for h/o of multiple VTE  - patient can f/u primary hematologist as outpatient  - will sign off, thank you

## 2022-04-14 NOTE — DISCHARGE NOTE NURSING/CASE MANAGEMENT/SOCIAL WORK - PATIENT PORTAL LINK FT
You can access the FollowMy[facility] Patient Portal offered by Health system by registering at the following website: [email or web address]. By joining [facility]’s FollowMyHealth portal, you will also be able to view your health information using other applications (apps) compatible with our system.

## 2022-04-14 NOTE — PROGRESS NOTE ADULT - REASON FOR ADMISSION
rectal bleeding.   On eliquis for  DVT( Factor V leiden deficiency)/ seizures/ Bipolar.

## 2022-04-14 NOTE — PROGRESS NOTE ADULT - SUBJECTIVE AND OBJECTIVE BOX
Full consult dictated    Plan: Pt admitted with intermittent rectal bleeding.  Pt has multiple medical problems and has been on Eliquis for DVYT.  H/H are presently stable and pt had CT Abd which did not show any abnormalities in early 3/2022.  Bleeding might be from hemorrhoids and would consider proctocream HC 2.5%. As long as H/H are stable would recommend against intervention at this time.
Patient is a 36y old  Female who presents with a chief complaint of rectal bleeding.   On eliquis for  DVT( Factor V leiden deficiency)/ seizures/ Bipolar. (14 Apr 2022 10:14)  aleret. awake. no distress   Vitals stable   BH notes appreciated.   stable for discharge   no more rectal bleeding   Hb/HCT stable    INTERVAL HPI/OVERNIGHT EVENTS:  PAST MEDICAL & SURGICAL HISTORY:  Asthma    Seizure    Factor V Leiden    Bipolar disorder    Endometriosis    History of DVT in adulthood  RLE on eliquis    Hypothyroid    Seasonal allergies    Insomnia    GERD (gastroesophageal reflux disease)    No significant past surgical history        MEDICATIONS  (STANDING):  apixaban 5 milliGRAM(s) Oral every 12 hours  ARIPiprazole 30 milliGRAM(s) Oral daily  diVALproex  milliGRAM(s) Oral <User Schedule>  hydrocortisone 2.5% Rectal Cream 1 Application(s) Rectal at bedtime  iohexol 300 mG (iodine)/mL Oral Solution 30 milliLiter(s) Oral once  levothyroxine 75 MICROGram(s) Oral daily  LORazepam     Tablet 0.5 milliGRAM(s) Oral <User Schedule>  lurasidone 40 milliGRAM(s) Oral daily  melatonin 6 milliGRAM(s) Oral at bedtime  pantoprazole  Injectable 40 milliGRAM(s) IV Push two times a day  QUEtiapine 50 milliGRAM(s) Oral <User Schedule>  QUEtiapine 200 milliGRAM(s) Oral <User Schedule>    MEDICATIONS  (PRN):  ALBUTerol    90 MICROgram(s) HFA Inhaler 2 Puff(s) Inhalation every 6 hours PRN Shortness of Breath and/or Wheezing  chlorproMAZINE    Tablet 50 milliGRAM(s) Oral every 6 hours PRN agitation/aggression      Allergies    latex (Blisters)  morphine (Unknown)  penicillin (Hives)  penicillin (Other)  pineapple (Unknown)  Toradol (Rash)  Toradol (Unknown)  Zofran (Hives)  Zofran (Unknown)    Intolerances        REVIEW OF SYSTEMS:  CONSTITUTIONAL: No fever, weight loss, or fatigue  EYES: No eye pain, visual disturbances, or discharge  ENMT:  No difficulty hearing, tinnitus, vertigo; No sinus or throat pain  NECK: No pain or stiffness  BREASTS: No pain, masses, or nipple discharge  RESPIRATORY: No cough, wheezing, chills or hemoptysis; No shortness of breath  CARDIOVASCULAR: No chest pain, palpitations, dizziness, or leg swelling  GASTROINTESTINAL: No abdominal or epigastric pain. No nausea, vomiting, or hematemesis; No diarrhea or constipation. No melena or hematochezia.  GENITOURINARY: No dysuria, frequency, hematuria, or incontinence  NEUROLOGICAL: No headaches, memory loss, loss of strength, numbness, or tremors  SKIN: No itching, burning, rashes, or lesions   LYMPH NODES: No enlarged glands  ENDOCRINE: No heat or cold intolerance; No hair loss  MUSCULOSKELETAL: No joint pain or swelling; No muscle, back, or extremity pain  PSYCHIATRIC: No depression, anxiety, mood swings, or difficulty sleeping  HEME/LYMPH: No easy bruising, or bleeding gums  ALLERY AND IMMUNOLOGIC: No hives or eczema    Vital Signs Last 24 Hrs  T(C): 36.4 (14 Apr 2022 05:08), Max: 37 (13 Apr 2022 12:02)  T(F): 97.6 (14 Apr 2022 05:08), Max: 98.6 (13 Apr 2022 12:02)  HR: 77 (14 Apr 2022 05:08) (77 - 139)  BP: 101/69 (14 Apr 2022 05:08) (101/69 - 119/81)  BP(mean): --  RR: 17 (14 Apr 2022 05:08) (17 - 18)  SpO2: 99% (14 Apr 2022 05:08) (96% - 100%)    PHYSICAL EXAM:  GENERAL: NAD, well-groomed, well-developed  HEAD:  Atraumatic, Normocephalic  EYES: EOMI, PERRLA, conjunctiva and sclera clear  ENMT: No tonsillar erythema, exudates, or enlargement; Moist mucous membranes, Good dentition, No lesions  NECK: Supple, No JVD, Normal thyroid  NERVOUS SYSTEM:  Alert & Oriented X3, Good concentration; Motor Strength 5/5 B/L upper and lower extremities; DTRs 2+ intact and symmetric  CHEST/LUNG: Clear to percussion bilaterally; No rales, rhonchi, wheezing, or rubs  HEART: Regular rate and rhythm; No murmurs, rubs, or gallops  ABDOMEN: Soft, Nontender, Nondistended; Bowel sounds present  EXTREMITIES:  2+ Peripheral Pulses, No clubbing, cyanosis, or edema  LYMPH: No lymphadenopathy noted  SKIN: No rashes or lesions    LABS:                        13.8   4.68  )-----------( 154      ( 13 Apr 2022 11:28 )             43.0     04-13    141  |  108  |  10  ----------------------------<  117<H>  3.7   |  28  |  0.88    Ca    9.9      13 Apr 2022 11:28            CAPILLARY BLOOD GLUCOSE                    RADIOLOGY & ADDITIONAL TESTS:    Imaging Personally Reviewed:  [ ] YES  [ ] NO    Consultant(s) Notes Reviewed:  [ ] YES  [ ] NO    Care Discussed with Consultants/Other Providers [ ] YES  [ ] NO    Care discussed with family,         [  ]   yes  [  ]  No    imp:    stable[ x]    unstable[  ]     improving [   ]       unchanged  [  ]                Plans:  Continue present plans  [x  ]               New consult [  ]   specialty  .......               order test[  ]    test name.                  Discharge Planning  [  ]           
Pt c/o abd discomfort but presently eating diet  c/o lightheaded but recently took Lorazepam  CT Abd ordered  labs stable      MEDICATIONS  (STANDING):  apixaban 5 milliGRAM(s) Oral every 12 hours  ARIPiprazole 30 milliGRAM(s) Oral daily  diphenhydrAMINE 25 milliGRAM(s) Oral once  diVALproex  milliGRAM(s) Oral <User Schedule>  hydrocortisone 2.5% Rectal Cream 1 Application(s) Rectal at bedtime  iohexol 300 mG (iodine)/mL Oral Solution 30 milliLiter(s) Oral once  levothyroxine 75 MICROGram(s) Oral daily  LORazepam     Tablet 0.5 milliGRAM(s) Oral <User Schedule>  lurasidone 40 milliGRAM(s) Oral daily  melatonin 6 milliGRAM(s) Oral at bedtime  QUEtiapine 50 milliGRAM(s) Oral <User Schedule>  QUEtiapine 200 milliGRAM(s) Oral <User Schedule>    MEDICATIONS  (PRN):  ALBUTerol    90 MICROgram(s) HFA Inhaler 2 Puff(s) Inhalation every 6 hours PRN Shortness of Breath and/or Wheezing  chlorproMAZINE    Tablet 50 milliGRAM(s) Oral every 6 hours PRN agitation/aggression      Allergies    latex (Blisters)  morphine (Unknown)  penicillin (Hives)  penicillin (Other)  pineapple (Unknown)  Toradol (Rash)  Toradol (Unknown)  Zofran (Hives)  Zofran (Unknown)    Intolerances        Vital Signs Last 24 Hrs  T(C): 36.8 (12 Apr 2022 11:48), Max: 36.9 (12 Apr 2022 06:29)  T(F): 98.2 (12 Apr 2022 11:48), Max: 98.5 (12 Apr 2022 06:29)  HR: 111 (12 Apr 2022 11:48) (79 - 134)  BP: 105/74 (12 Apr 2022 11:48) (90/65 - 128/88)  BP(mean): --  RR: 17 (12 Apr 2022 11:48) (17 - 19)  SpO2: 97% (12 Apr 2022 11:48) (96% - 100%)    PHYSICAL EXAM:  General: NAD.  CVS: S1, S2  Chest: air entry bilaterally present  Abd: BS present, soft, non-tender      LABS:                        11.8   4.30  )-----------( 128      ( 12 Apr 2022 10:57 )             36.4     04-11    140  |  111<H>  |  13  ----------------------------<  89  4.5   |  24  |  0.70    Ca    8.6      11 Apr 2022 06:45    TPro  8.1  /  Alb  3.7  /  TBili  0.3  /  DBili  x   /  AST  31  /  ALT  28  /  AlkPhos  59  04-10    PT/INR - ( 10 Apr 2022 17:48 )   PT: 14.6 sec;   INR: 1.22 ratio         PTT - ( 10 Apr 2022 17:48 )  PTT:19.7 sec    await CT abd    
Pt stable   No Bleeding  Pt very sensitive to certain foods - ?IBS      MEDICATIONS  (STANDING):  apixaban 5 milliGRAM(s) Oral every 12 hours  ARIPiprazole 30 milliGRAM(s) Oral daily  diVALproex  milliGRAM(s) Oral <User Schedule>  hydrocortisone 2.5% Rectal Cream 1 Application(s) Rectal at bedtime  iohexol 300 mG (iodine)/mL Oral Solution 30 milliLiter(s) Oral once  levothyroxine 75 MICROGram(s) Oral daily  LORazepam     Tablet 0.5 milliGRAM(s) Oral <User Schedule>  lurasidone 40 milliGRAM(s) Oral daily  melatonin 6 milliGRAM(s) Oral at bedtime  pantoprazole  Injectable 40 milliGRAM(s) IV Push two times a day  QUEtiapine 50 milliGRAM(s) Oral <User Schedule>  QUEtiapine 200 milliGRAM(s) Oral <User Schedule>    MEDICATIONS  (PRN):  ALBUTerol    90 MICROgram(s) HFA Inhaler 2 Puff(s) Inhalation every 6 hours PRN Shortness of Breath and/or Wheezing  chlorproMAZINE    Tablet 50 milliGRAM(s) Oral every 6 hours PRN agitation/aggression      Allergies    latex (Blisters)  morphine (Unknown)  penicillin (Hives)  penicillin (Other)  pineapple (Unknown)  Toradol (Rash)  Toradol (Unknown)  Zofran (Hives)  Zofran (Unknown)    Intolerances        Vital Signs Last 24 Hrs  T(C): 36.9 (14 Apr 2022 11:46), Max: 36.9 (14 Apr 2022 11:46)  T(F): 98.4 (14 Apr 2022 11:46), Max: 98.4 (14 Apr 2022 11:46)  HR: 110 (14 Apr 2022 11:46) (77 - 123)  BP: 130/80 (14 Apr 2022 11:46) (101/69 - 130/80)  BP(mean): --  RR: 18 (14 Apr 2022 11:46) (17 - 18)  SpO2: 100% (14 Apr 2022 11:46) (96% - 100%)    PHYSICAL EXAM:  General: NAD.  CVS: S1, S2  Chest: air entry bilaterally present  Abd: BS present, soft, non-tender      LABS:                        13.8   4.68  )-----------( 154      ( 13 Apr 2022 11:28 )             43.0     04-13    141  |  108  |  10  ----------------------------<  117<H>  3.7   |  28  |  0.88    Ca    9.9      13 Apr 2022 11:28      d/c planning  suggest outpatient GI eval      
lying in bed    Vital Signs Last 24 Hrs  T(C): 36.4 (14 Apr 2022 05:08), Max: 37 (13 Apr 2022 12:02)  T(F): 97.6 (14 Apr 2022 05:08), Max: 98.6 (13 Apr 2022 12:02)  HR: 77 (14 Apr 2022 05:08) (77 - 139)  BP: 101/69 (14 Apr 2022 05:08) (101/69 - 119/81)  BP(mean): --  RR: 17 (14 Apr 2022 05:08) (17 - 18)  SpO2: 99% (14 Apr 2022 05:08) (96% - 100%)    PHYSICAL EXAM:    general - AAO x 3  HEENT - No Icterus  CVS - RRR  RS - AE B/L  Abd - soft, NT  Ext - Pulses +        LABS:                        13.8   4.68  )-----------( 154      ( 13 Apr 2022 11:28 )             43.0     04-13    141  |  108  |  10  ----------------------------<  117<H>  3.7   |  28  |  0.88    Ca    9.9      13 Apr 2022 11:28            Culture - Urine (collected 06 Apr 2022 12:41)  Source: Clean Catch Clean Catch (Midstream)  Final Report (07 Apr 2022 11:55):    <10,000 CFU/mL Normal Urogenital Samira        
No bleeding  H/H stable      MEDICATIONS  (STANDING):  apixaban 5 milliGRAM(s) Oral every 12 hours  ARIPiprazole 30 milliGRAM(s) Oral daily  diVALproex  milliGRAM(s) Oral <User Schedule>  hydrocortisone 2.5% Rectal Cream 1 Application(s) Rectal at bedtime  iohexol 300 mG (iodine)/mL Oral Solution 30 milliLiter(s) Oral once  levothyroxine 75 MICROGram(s) Oral daily  LORazepam     Tablet 0.5 milliGRAM(s) Oral <User Schedule>  lurasidone 40 milliGRAM(s) Oral daily  melatonin 6 milliGRAM(s) Oral at bedtime  pantoprazole  Injectable 40 milliGRAM(s) IV Push two times a day  QUEtiapine 50 milliGRAM(s) Oral <User Schedule>  QUEtiapine 200 milliGRAM(s) Oral <User Schedule>    MEDICATIONS  (PRN):  ALBUTerol    90 MICROgram(s) HFA Inhaler 2 Puff(s) Inhalation every 6 hours PRN Shortness of Breath and/or Wheezing  chlorproMAZINE    Tablet 50 milliGRAM(s) Oral every 6 hours PRN agitation/aggression      Allergies    latex (Blisters)  morphine (Unknown)  penicillin (Hives)  penicillin (Other)  pineapple (Unknown)  Toradol (Rash)  Toradol (Unknown)  Zofran (Hives)  Zofran (Unknown)    Intolerances        Vital Signs Last 24 Hrs  T(C): 36.8 (13 Apr 2022 15:51), Max: 37 (13 Apr 2022 12:02)  T(F): 98.2 (13 Apr 2022 15:51), Max: 98.6 (13 Apr 2022 12:02)  HR: 123 (13 Apr 2022 15:51) (90 - 139)  BP: 106/76 (13 Apr 2022 15:51) (89/60 - 128/80)  BP(mean): --  RR: 17 (13 Apr 2022 15:51) (17 - 18)  SpO2: 98% (13 Apr 2022 15:51) (96% - 99%)    PHYSICAL EXAM:  General: NAD.  CVS: S1, S2  Chest: air entry bilaterally present  Abd: BS present, soft, non-tender      LABS:                        13.8   4.68  )-----------( 154      ( 13 Apr 2022 11:28 )             43.0     04-13    141  |  108  |  10  ----------------------------<  117<H>  3.7   |  28  |  0.88    Ca    9.9      13 Apr 2022 11:28      CT Angio Abd Aorta w/run-off w/ IV Cont (04.12.22 @ 14:38) >  Impression:    No evidence of active GI bleeding    Moderate amount of stool seen throughout the colon    Stable left ovarian cyst.    Stable from GI standpoint
Pt stable - no active bleeding  on eliquis      MEDICATIONS  (STANDING):  apixaban 5 milliGRAM(s) Oral every 12 hours  ARIPiprazole 30 milliGRAM(s) Oral daily  diVALproex  milliGRAM(s) Oral <User Schedule>  levothyroxine 75 MICROGram(s) Oral daily  LORazepam     Tablet 0.5 milliGRAM(s) Oral <User Schedule>  lurasidone 40 milliGRAM(s) Oral daily  melatonin 6 milliGRAM(s) Oral at bedtime  QUEtiapine 50 milliGRAM(s) Oral <User Schedule>  QUEtiapine 200 milliGRAM(s) Oral <User Schedule>    MEDICATIONS  (PRN):  ALBUTerol    90 MICROgram(s) HFA Inhaler 2 Puff(s) Inhalation every 6 hours PRN Shortness of Breath and/or Wheezing  chlorproMAZINE    Tablet 50 milliGRAM(s) Oral every 6 hours PRN agitation/aggression      Allergies    latex (Blisters)  morphine (Unknown)  penicillin (Hives)  penicillin (Other)  pineapple (Unknown)  Toradol (Rash)  Toradol (Unknown)  Zofran (Hives)  Zofran (Unknown)    Intolerances        Vital Signs Last 24 Hrs  T(C): 36.6 (11 Apr 2022 05:10), Max: 37.1 (10 Apr 2022 11:25)  T(F): 97.9 (11 Apr 2022 05:10), Max: 98.7 (10 Apr 2022 11:25)  HR: 81 (11 Apr 2022 05:10) (74 - 106)  BP: 104/71 (11 Apr 2022 05:10) (96/60 - 113/74)  BP(mean): --  RR: 17 (11 Apr 2022 05:10) (17 - 20)  SpO2: 98% (11 Apr 2022 05:10) (96% - 99%)    PHYSICAL EXAM:  General: NAD.  CVS: S1, S2  Chest: air entry bilaterally present  Abd: BS present, soft, non-tender      LABS:                        11.6   4.51  )-----------( 110      ( 11 Apr 2022 06:45 )             37.0     04-11    140  |  111<H>  |  13  ----------------------------<  89  4.5   |  24  |  0.70    Ca    8.6      11 Apr 2022 06:45    TPro  8.1  /  Alb  3.7  /  TBili  0.3  /  DBili  x   /  AST  31  /  ALT  28  /  AlkPhos  59  04-10    PT/INR - ( 10 Apr 2022 17:48 )   PT: 14.6 sec;   INR: 1.22 ratio         PTT - ( 10 Apr 2022 17:48 )  PTT:19.7 sec    follow CBC  Hematology to see patient  Proctocream or anusol HC      
Patient is a 36y old  Female who presents with a chief complaint of rectal bleeding.   On eliquis for  DVT( Factor V leiden deficiency)/ seizures/ Bipolar. (11 Apr 2022 10:25)  vitals stable   Hb/HCt stable   labs reviewed   discussed case with vascular and Gi  will monitor CBC    INTERVAL HPI/OVERNIGHT EVENTS:  PAST MEDICAL & SURGICAL HISTORY:  Asthma    Seizure    Factor V Leiden    Bipolar disorder    Endometriosis    History of DVT in adulthood  RLE on eliquis    Hypothyroid    Seasonal allergies    Insomnia    GERD (gastroesophageal reflux disease)    No significant past surgical history        MEDICATIONS  (STANDING):  apixaban 5 milliGRAM(s) Oral every 12 hours  ARIPiprazole 30 milliGRAM(s) Oral daily  diVALproex  milliGRAM(s) Oral <User Schedule>  hydrocortisone 2.5% Rectal Cream 1 Application(s) Rectal at bedtime  levothyroxine 75 MICROGram(s) Oral daily  LORazepam     Tablet 0.5 milliGRAM(s) Oral <User Schedule>  lurasidone 40 milliGRAM(s) Oral daily  melatonin 6 milliGRAM(s) Oral at bedtime  QUEtiapine 50 milliGRAM(s) Oral <User Schedule>  QUEtiapine 200 milliGRAM(s) Oral <User Schedule>    MEDICATIONS  (PRN):  ALBUTerol    90 MICROgram(s) HFA Inhaler 2 Puff(s) Inhalation every 6 hours PRN Shortness of Breath and/or Wheezing  chlorproMAZINE    Tablet 50 milliGRAM(s) Oral every 6 hours PRN agitation/aggression      Allergies    latex (Blisters)  morphine (Unknown)  penicillin (Hives)  penicillin (Other)  pineapple (Unknown)  Toradol (Rash)  Toradol (Unknown)  Zofran (Hives)  Zofran (Unknown)    Intolerances        REVIEW OF SYSTEMS:  CONSTITUTIONAL: No fever, weight loss, or fatigue  EYES: No eye pain, visual disturbances, or discharge  ENMT:  No difficulty hearing, tinnitus, vertigo; No sinus or throat pain  NECK: No pain or stiffness  BREASTS: No pain, masses, or nipple discharge  RESPIRATORY: No cough, wheezing, chills or hemoptysis; No shortness of breath  CARDIOVASCULAR: No chest pain, palpitations, dizziness, or leg swelling  GASTROINTESTINAL: No abdominal or epigastric pain. No nausea, vomiting, or hematemesis; No diarrhea or constipation. No melena or hematochezia.  GENITOURINARY: No dysuria, frequency, hematuria, or incontinence  NEUROLOGICAL: No headaches, memory loss, loss of strength, numbness, or tremors  SKIN: No itching, burning, rashes, or lesions   LYMPH NODES: No enlarged glands  ENDOCRINE: No heat or cold intolerance; No hair loss  MUSCULOSKELETAL: No joint pain or swelling; No muscle, back, or extremity pain  PSYCHIATRIC: No depression, anxiety, mood swings, or difficulty sleeping  HEME/LYMPH: No easy bruising, or bleeding gums  ALLERY AND IMMUNOLOGIC: No hives or eczema    Vital Signs Last 24 Hrs  T(C): 36.6 (11 Apr 2022 05:10), Max: 37 (10 Apr 2022 13:24)  T(F): 97.9 (11 Apr 2022 05:10), Max: 98.6 (10 Apr 2022 13:24)  HR: 81 (11 Apr 2022 05:10) (74 - 101)  BP: 104/71 (11 Apr 2022 05:10) (96/60 - 113/74)  BP(mean): --  RR: 17 (11 Apr 2022 05:10) (17 - 20)  SpO2: 98% (11 Apr 2022 05:10) (96% - 99%)    PHYSICAL EXAM:  GENERAL: NAD, well-groomed, well-developed  HEAD:  Atraumatic, Normocephalic  EYES: EOMI, PERRLA, conjunctiva and sclera clear  ENMT: No tonsillar erythema, exudates, or enlargement; Moist mucous membranes, Good dentition, No lesions  NECK: Supple, No JVD, Normal thyroid  NERVOUS SYSTEM:  Alert & Oriented X3, Good concentration; Motor Strength 5/5 B/L upper and lower extremities; DTRs 2+ intact and symmetric  CHEST/LUNG: Clear to percussion bilaterally; No rales, rhonchi, wheezing, or rubs  HEART: Regular rate and rhythm; No murmurs, rubs, or gallops  ABDOMEN: Soft, Nontender, Nondistended; Bowel sounds present  EXTREMITIES:  2+ Peripheral Pulses, No clubbing, cyanosis, or edema  LYMPH: No lymphadenopathy noted  SKIN: No rashes or lesions    LABS:                        11.6   4.51  )-----------( 110      ( 11 Apr 2022 06:45 )             37.0     04-11    140  |  111<H>  |  13  ----------------------------<  89  4.5   |  24  |  0.70    Ca    8.6      11 Apr 2022 06:45    TPro  8.1  /  Alb  3.7  /  TBili  0.3  /  DBili  x   /  AST  31  /  ALT  28  /  AlkPhos  59  04-10      PT/INR - ( 10 Apr 2022 17:48 )   PT: 14.6 sec;   INR: 1.22 ratio         PTT - ( 10 Apr 2022 17:48 )  PTT:19.7 sec    CAPILLARY BLOOD GLUCOSE                Cholesterol, Serum: 180 mg/dL (04-06 @ 11:09)  HDL Cholesterol, Serum: 54 mg/dL (04-06 @ 11:09)  Triglycerides, Serum: 81 mg/dL (04-06 @ 11:09)      RADIOLOGY & ADDITIONAL TESTS:    Imaging Personally Reviewed:  [ ] YES  [ ] NO    Consultant(s) Notes Reviewed:  [ ] YES  [ ] NO    Care Discussed with Consultants/Other Providers [ ] YES  [ ] NO    Care discussed with family,         [  ]   yes  [  ]  No    imp:    stable[x ]    unstable[  ]     improving [   ]       unchanged  [  ]                Plans:  Continue present plans  [ x ]               New consult [  ]   specialty  Hematology consult.               order test[  ]    test name.  cbc                Discharge Planning  [  ]           
lying in bed    Vital Signs Last 24 Hrs  T(C): 36.8 (13 Apr 2022 02:13), Max: 36.8 (12 Apr 2022 11:48)  T(F): 98.2 (13 Apr 2022 02:13), Max: 98.3 (12 Apr 2022 20:50)  HR: 95 (13 Apr 2022 02:13) (90 - 120)  BP: 128/80 (13 Apr 2022 02:13) (89/60 - 128/80)  BP(mean): --  RR: 18 (13 Apr 2022 02:13) (17 - 18)  SpO2: 97% (13 Apr 2022 02:13) (96% - 97%)    PHYSICAL EXAM:    general - AAO x 3  HEENT - No Icterus  CVS - RRR  RS - AE B/L  Abd - soft, NT  Ext - Pulses +        LABS:                        11.8   4.30  )-----------( 128      ( 12 Apr 2022 10:57 )             36.4                 Culture - Urine (collected 06 Apr 2022 12:41)  Source: Clean Catch Clean Catch (Midstream)  Final Report (07 Apr 2022 11:55):    <10,000 CFU/mL Normal Urogenital Samira          
lying in bed    Vital Signs Last 24 Hrs  T(C): 36.9 (12 Apr 2022 06:29), Max: 36.9 (12 Apr 2022 06:29)  T(F): 98.5 (12 Apr 2022 06:29), Max: 98.5 (12 Apr 2022 06:29)  HR: 134 (12 Apr 2022 09:35) (79 - 134)  BP: 90/65 (12 Apr 2022 09:35) (90/65 - 128/88)  BP(mean): --  RR: 19 (12 Apr 2022 09:35) (17 - 19)  SpO2: 97% (12 Apr 2022 09:35) (96% - 100%)    PHYSICAL EXAM:    general - AAO x 3  HEENT - No Icterus  CVS - RRR  RS - AE B/L  Abd - soft, NT  Ext - Pulses +        LABS:                        11.6   4.51  )-----------( 110      ( 11 Apr 2022 06:45 )             37.0     04-11    140  |  111<H>  |  13  ----------------------------<  89  4.5   |  24  |  0.70    Ca    8.6      11 Apr 2022 06:45    TPro  8.1  /  Alb  3.7  /  TBili  0.3  /  DBili  x   /  AST  31  /  ALT  28  /  AlkPhos  59  04-10    PT/INR - ( 10 Apr 2022 17:48 )   PT: 14.6 sec;   INR: 1.22 ratio         PTT - ( 10 Apr 2022 17:48 )  PTT:19.7 sec      Culture - Urine (collected 06 Apr 2022 12:41)  Source: Clean Catch Clean Catch (Midstream)  Final Report (07 Apr 2022 11:55):    <10,000 CFU/mL Normal Urogenital Samira          
Patient is a 36y old  Female who presents with a chief complaint of rectal bleeding.   On eliquis for  DVT( Factor V leiden deficiency)/ seizures/ Bipolar. (12 Apr 2022 10:53)  patient complaining Bp shows tachycardia 9120) and orthostatic of itching and is orthostatic.   However she is in no distress. no active rectal bleeding. complained of  Luq pain, , no nausea or vomiting  repeat cbc - Hb stable at 10 am  INTERVAL HPI/OVERNIGHT EVENTS:  PAST MEDICAL & SURGICAL HISTORY:  Asthma    Seizure    Factor V Leiden    Bipolar disorder    Endometriosis    History of DVT in adulthood  RLE on eliquis    Hypothyroid    Seasonal allergies    Insomnia    GERD (gastroesophageal reflux disease)    No significant past surgical history        MEDICATIONS  (STANDING):  apixaban 5 milliGRAM(s) Oral every 12 hours  ARIPiprazole 30 milliGRAM(s) Oral daily  diVALproex  milliGRAM(s) Oral <User Schedule>  hydrocortisone 2.5% Rectal Cream 1 Application(s) Rectal at bedtime  iohexol 300 mG (iodine)/mL Oral Solution 30 milliLiter(s) Oral once  levothyroxine 75 MICROGram(s) Oral daily  LORazepam     Tablet 0.5 milliGRAM(s) Oral <User Schedule>  lurasidone 40 milliGRAM(s) Oral daily  melatonin 6 milliGRAM(s) Oral at bedtime  QUEtiapine 50 milliGRAM(s) Oral <User Schedule>  QUEtiapine 200 milliGRAM(s) Oral <User Schedule>    MEDICATIONS  (PRN):  ALBUTerol    90 MICROgram(s) HFA Inhaler 2 Puff(s) Inhalation every 6 hours PRN Shortness of Breath and/or Wheezing  chlorproMAZINE    Tablet 50 milliGRAM(s) Oral every 6 hours PRN agitation/aggression      Allergies    latex (Blisters)  morphine (Unknown)  penicillin (Hives)  penicillin (Other)  pineapple (Unknown)  Toradol (Rash)  Toradol (Unknown)  Zofran (Hives)  Zofran (Unknown)    Intolerances        REVIEW OF SYSTEMS:  CONSTITUTIONAL: No fever, weight loss, or fatigue  EYES: No eye pain, visual disturbances, or discharge  ENMT:  No difficulty hearing, tinnitus, vertigo; No sinus or throat pain  NECK: No pain or stiffness  BREASTS: No pain, masses, or nipple discharge  RESPIRATORY: No cough, wheezing, chills or hemoptysis; No shortness of breath  CARDIOVASCULAR: No chest pain, palpitations, dizziness, or leg swelling  GASTROINTESTINAL: No abdominal or epigastric pain. No nausea, vomiting, or hematemesis; No diarrhea or constipation. No melena or hematochezia.  GENITOURINARY: No dysuria, frequency, hematuria, or incontinence  NEUROLOGICAL: No headaches, memory loss, loss of strength, numbness, or tremors  SKIN: No itching, burning, rashes, or lesions   LYMPH NODES: No enlarged glands  ENDOCRINE: No heat or cold intolerance; No hair loss  MUSCULOSKELETAL: No joint pain or swelling; No muscle, back, or extremity pain  PSYCHIATRIC: No depression, anxiety, mood swings, or difficulty sleeping  HEME/LYMPH: No easy bruising, or bleeding gums  ALLERY AND IMMUNOLOGIC: No hives or eczema    Vital Signs Last 24 Hrs  T(C): 36.9 (12 Apr 2022 06:29), Max: 36.9 (12 Apr 2022 06:29)  T(F): 98.5 (12 Apr 2022 06:29), Max: 98.5 (12 Apr 2022 06:29)  HR: 134 (12 Apr 2022 09:35) (79 - 134)  BP: 90/65 (12 Apr 2022 09:35) (90/65 - 128/88)  BP(mean): --  RR: 19 (12 Apr 2022 09:35) (17 - 19)  SpO2: 97% (12 Apr 2022 09:35) (96% - 100%)    PHYSICAL EXAM:  GENERAL: NAD, well-groomed, well-developed  HEAD:  Atraumatic, Normocephalic  EYES: EOMI, PERRLA, conjunctiva and sclera clear  ENMT: No tonsillar erythema, exudates, or enlargement; Moist mucous membranes, Good dentition, No lesions  NECK: Supple, No JVD, Normal thyroid  NERVOUS SYSTEM:  Alert & Oriented X3, Good concentration; Motor Strength 5/5 B/L upper and lower extremities; DTRs 2+ intact and symmetric  CHEST/LUNG: Clear to percussion bilaterally; No rales, rhonchi, wheezing, or rubs  HEART: Regular rate and rhythm; No murmurs, rubs, or gallops  ABDOMEN: Soft, Nontender, Nondistended; Bowel sounds present  EXTREMITIES:  2+ Peripheral Pulses, No clubbing, cyanosis, or edema  LYMPH: No lymphadenopathy noted  SKIN: No rashes or lesions    LABS:                        11.8   4.30  )-----------( 128      ( 12 Apr 2022 10:57 )             36.4     04-11    140  |  111<H>  |  13  ----------------------------<  89  4.5   |  24  |  0.70    Ca    8.6      11 Apr 2022 06:45    TPro  8.1  /  Alb  3.7  /  TBili  0.3  /  DBili  x   /  AST  31  /  ALT  28  /  AlkPhos  59  04-10      PT/INR - ( 10 Apr 2022 17:48 )   PT: 14.6 sec;   INR: 1.22 ratio         PTT - ( 10 Apr 2022 17:48 )  PTT:19.7 sec    CAPILLARY BLOOD GLUCOSE                Cholesterol, Serum: 180 mg/dL (04-06 @ 11:09)  HDL Cholesterol, Serum: 54 mg/dL (04-06 @ 11:09)  Triglycerides, Serum: 81 mg/dL (04-06 @ 11:09)      RADIOLOGY & ADDITIONAL TESTS:    Imaging Personally Reviewed:  [ ] YES  [ ] NO    Consultant(s) Notes Reviewed:  [ ] YES  [ ] NO    Care Discussed with Consultants/Other Providers [ ] YES  [ ] NO    Care discussed with family,         [  ]   yes  [  ]  No    imp:    stable[ ]    unstable[  ]     improving [   ]       unchanged  [ x ]                Plans:  Continue present plans  [ x ] tachyacrdia - will give iv fluids, monitor cbc,  benadryl Po for itching, abd ct                 New consult [  ]   specialty  .......               order test[  ]    test name. labs in am , abd ct now, ekg now                Discharge Planning  [  ]           
Patient is a 36y old  Female who presents with a chief complaint of rectal bleeding.   On eliquis for  DVT( Factor V leiden deficiency)/ seizures/ Bipolar. (13 Apr 2022 10:41)  patient is alert. awake. no distress.    Has RRt yesterday for ? - patient had previous admission with similar  activity   no active Gi bleed. Hb/ HCt stable   cleared by GI/ hematology  Ct abd (CTA)  shoed no acute bleed or pathology, only old left ovarian    has behavioral issues now in spite of her reg  meds    INTERVAL HPI/OVERNIGHT EVENTS: events noted. -  PAST MEDICAL & SURGICAL HISTORY:  Asthma    Seizure    Factor V Leiden    Bipolar disorder    Endometriosis    History of DVT in adulthood  RLE on eliquis    Hypothyroid    Seasonal allergies    Insomnia    GERD (gastroesophageal reflux disease)    No significant past surgical history        MEDICATIONS  (STANDING):  apixaban 5 milliGRAM(s) Oral every 12 hours  ARIPiprazole 30 milliGRAM(s) Oral daily  diVALproex  milliGRAM(s) Oral <User Schedule>  hydrocortisone 2.5% Rectal Cream 1 Application(s) Rectal at bedtime  iohexol 300 mG (iodine)/mL Oral Solution 30 milliLiter(s) Oral once  levothyroxine 75 MICROGram(s) Oral daily  LORazepam     Tablet 0.5 milliGRAM(s) Oral <User Schedule>  lurasidone 40 milliGRAM(s) Oral daily  melatonin 6 milliGRAM(s) Oral at bedtime  pantoprazole  Injectable 40 milliGRAM(s) IV Push two times a day  QUEtiapine 50 milliGRAM(s) Oral <User Schedule>  QUEtiapine 200 milliGRAM(s) Oral <User Schedule>    MEDICATIONS  (PRN):  ALBUTerol    90 MICROgram(s) HFA Inhaler 2 Puff(s) Inhalation every 6 hours PRN Shortness of Breath and/or Wheezing  chlorproMAZINE    Tablet 50 milliGRAM(s) Oral every 6 hours PRN agitation/aggression      Allergies    latex (Blisters)  morphine (Unknown)  penicillin (Hives)  penicillin (Other)  pineapple (Unknown)  Toradol (Rash)  Toradol (Unknown)  Zofran (Hives)  Zofran (Unknown)    Intolerances        REVIEW OF SYSTEMS:  CONSTITUTIONAL: No fever, weight loss, or fatigue  EYES: No eye pain, visual disturbances, or discharge  ENMT:  No difficulty hearing, tinnitus, vertigo; No sinus or throat pain  NECK: No pain or stiffness  BREASTS: No pain, masses, or nipple discharge  RESPIRATORY: No cough, wheezing, chills or hemoptysis; No shortness of breath  CARDIOVASCULAR: No chest pain, palpitations, dizziness, or leg swelling  GASTROINTESTINAL: No abdominal or epigastric pain. No nausea, vomiting, or hematemesis; No diarrhea or constipation. No melena or hematochezia.  GENITOURINARY: No dysuria, frequency, hematuria, or incontinence  NEUROLOGICAL: No headaches, memory loss, loss of strength, numbness, or tremors  SKIN: No itching, burning, rashes, or lesions   LYMPH NODES: No enlarged glands  ENDOCRINE: No heat or cold intolerance; No hair loss  MUSCULOSKELETAL: No joint pain or swelling; No muscle, back, or extremity pain  PSYCHIATRIC: No depression, anxiety, mood swings, or difficulty sleeping  HEME/LYMPH: No easy bruising, or bleeding gums  ALLERY AND IMMUNOLOGIC: No hives or eczema    Vital Signs Last 24 Hrs  T(C): 36.8 (13 Apr 2022 02:13), Max: 36.8 (12 Apr 2022 11:48)  T(F): 98.2 (13 Apr 2022 02:13), Max: 98.3 (12 Apr 2022 20:50)  HR: 95 (13 Apr 2022 02:13) (90 - 120)  BP: 128/80 (13 Apr 2022 02:13) (89/60 - 128/80)  BP(mean): --  RR: 18 (13 Apr 2022 02:13) (17 - 18)  SpO2: 97% (13 Apr 2022 02:13) (96% - 97%)    PHYSICAL EXAM:  GENERAL: NAD, well-groomed, well-developed  HEAD:  Atraumatic, Normocephalic  EYES: EOMI, PERRLA, conjunctiva and sclera clear  ENMT: No tonsillar erythema, exudates, or enlargement; Moist mucous membranes, Good dentition, No lesions  NECK: Supple, No JVD, Normal thyroid  NERVOUS SYSTEM:  Alert & Oriented X3, Good concentration; Motor Strength 5/5 B/L upper and lower extremities; DTRs 2+ intact and symmetric  CHEST/LUNG: Clear to percussion bilaterally; No rales, rhonchi, wheezing, or rubs  HEART: Regular rate and rhythm; No murmurs, rubs, or gallops  ABDOMEN: Soft, Nontender, Nondistended; Bowel sounds present  EXTREMITIES:  2+ Peripheral Pulses, No clubbing, cyanosis, or edema  LYMPH: No lymphadenopathy noted  SKIN: No rashes or lesions    LABS:                        11.8   4.30  )-----------( 128      ( 12 Apr 2022 10:57 )             36.4                 CAPILLARY BLOOD GLUCOSE      POCT Blood Glucose.: 99 mg/dL (12 Apr 2022 14:42)                RADIOLOGY & ADDITIONAL TESTS:    Imaging Personally Reviewed:  [ ] YES  [ ] NO    Consultant(s) Notes Reviewed:  [ ] YES  [ ] NO    Care Discussed with Consultants/Other Providers [x ] YES  [ ] NO    Care discussed with family,         [  ]   yes  [  ]  No    imp:    x]    unstable[  ]     improving [   ]       unchanged  [  ]                Plans:  Continue present plans  x]               New consult [  ]   specialty  ...BH....               order test[  ]    test name.                  Discharge Planning  [  ]

## 2022-04-14 NOTE — CHART NOTE - NSCHARTNOTEFT_GEN_A_CORE
Medicine PA     This is a 35 yo female with pmhx DVT on Eliquis ( Factor V Leiden deficiency), Seizures and bipolar disorder admitted to medicine on 4/10/2022 for rectal bleeding. Patient states she has been having pain in her upper left arm since this morning at the site of her progesterone implant. Patient states contraceptive implant was placed at [facility] when she was admitted there. She reports it started hurting this morning which movement of her arm. Patient denies fever, chills, itching, warmth or redness to the area. Upon physical exam, implant was felt, in place about 4 cm in length, in tach within normal measures. Skin was dry, no over laying erythema, warmth or fluctuance to the area, no signs of infection or abscess. patient was advised to follow up with OBGYN clinic upon discharge.
Medicine Hospitalist PA    Requested by RN to place an IV heplock in patient. As per RN pt is a difficult stick. Placed IV heplock in left basilic and right antecub. #20g using ultrasound guidance. IV flushed and secured. RN aware.

## 2022-04-14 NOTE — PROGRESS NOTE ADULT - TIME BILLING
clinical eval, review labs, discuss with patient and team
, discuss with patient/ team
clinical eval, review labs, discuss with team./ GI/ patient
clinical eval, review labs, discuss with patient/ gi  and vascular consultants

## 2022-04-15 ENCOUNTER — EMERGENCY (EMERGENCY)
Facility: HOSPITAL | Age: 37
LOS: 1 days | Discharge: HOME | End: 2022-04-15
Attending: EMERGENCY MEDICINE | Admitting: EMERGENCY MEDICINE
Payer: MEDICARE

## 2022-04-15 VITALS
HEIGHT: 68 IN | RESPIRATION RATE: 18 BRPM | TEMPERATURE: 98 F | DIASTOLIC BLOOD PRESSURE: 76 MMHG | SYSTOLIC BLOOD PRESSURE: 122 MMHG | OXYGEN SATURATION: 100 % | HEART RATE: 90 BPM

## 2022-04-15 LAB
ANION GAP SERPL CALC-SCNC: 12 MMOL/L — SIGNIFICANT CHANGE UP (ref 7–14)
BASOPHILS # BLD AUTO: 0.03 K/UL — SIGNIFICANT CHANGE UP (ref 0–0.2)
BASOPHILS NFR BLD AUTO: 0.4 % — SIGNIFICANT CHANGE UP (ref 0–2)
BUN SERPL-MCNC: 12 MG/DL — SIGNIFICANT CHANGE UP (ref 7–23)
CALCIUM SERPL-MCNC: 9.3 MG/DL — SIGNIFICANT CHANGE UP (ref 8.4–10.5)
CHLORIDE SERPL-SCNC: 104 MMOL/L — SIGNIFICANT CHANGE UP (ref 98–107)
CO2 SERPL-SCNC: 21 MMOL/L — LOW (ref 22–31)
CREAT SERPL-MCNC: 0.7 MG/DL — SIGNIFICANT CHANGE UP (ref 0.5–1.3)
EGFR: 116 ML/MIN/1.73M2 — SIGNIFICANT CHANGE UP
EOSINOPHIL # BLD AUTO: 0.03 K/UL — SIGNIFICANT CHANGE UP (ref 0–0.5)
EOSINOPHIL NFR BLD AUTO: 0.4 % — SIGNIFICANT CHANGE UP (ref 0–6)
GLUCOSE SERPL-MCNC: 83 MG/DL — SIGNIFICANT CHANGE UP (ref 70–99)
HCT VFR BLD CALC: 36.7 % — SIGNIFICANT CHANGE UP (ref 34.5–45)
HGB BLD-MCNC: 11.6 G/DL — SIGNIFICANT CHANGE UP (ref 11.5–15.5)
IANC: 3.47 K/UL — SIGNIFICANT CHANGE UP (ref 1.8–7.4)
IMM GRANULOCYTES NFR BLD AUTO: 0.3 % — SIGNIFICANT CHANGE UP (ref 0–1.5)
LYMPHOCYTES # BLD AUTO: 2.55 K/UL — SIGNIFICANT CHANGE UP (ref 1–3.3)
LYMPHOCYTES # BLD AUTO: 38.2 % — SIGNIFICANT CHANGE UP (ref 13–44)
MCHC RBC-ENTMCNC: 30 PG — SIGNIFICANT CHANGE UP (ref 27–34)
MCHC RBC-ENTMCNC: 31.6 GM/DL — LOW (ref 32–36)
MCV RBC AUTO: 94.8 FL — SIGNIFICANT CHANGE UP (ref 80–100)
MONOCYTES # BLD AUTO: 0.58 K/UL — SIGNIFICANT CHANGE UP (ref 0–0.9)
MONOCYTES NFR BLD AUTO: 8.7 % — SIGNIFICANT CHANGE UP (ref 2–14)
NEUTROPHILS # BLD AUTO: 3.47 K/UL — SIGNIFICANT CHANGE UP (ref 1.8–7.4)
NEUTROPHILS NFR BLD AUTO: 52 % — SIGNIFICANT CHANGE UP (ref 43–77)
NRBC # BLD: 0 /100 WBCS — SIGNIFICANT CHANGE UP
NRBC # FLD: 0 K/UL — SIGNIFICANT CHANGE UP
PLATELET # BLD AUTO: 153 K/UL — SIGNIFICANT CHANGE UP (ref 150–400)
POTASSIUM SERPL-MCNC: SIGNIFICANT CHANGE UP MMOL/L (ref 3.5–5.3)
POTASSIUM SERPL-SCNC: SIGNIFICANT CHANGE UP MMOL/L (ref 3.5–5.3)
RBC # BLD: 3.87 M/UL — SIGNIFICANT CHANGE UP (ref 3.8–5.2)
RBC # FLD: 15.4 % — HIGH (ref 10.3–14.5)
SODIUM SERPL-SCNC: 137 MMOL/L — SIGNIFICANT CHANGE UP (ref 135–145)
WBC # BLD: 6.68 K/UL — SIGNIFICANT CHANGE UP (ref 3.8–10.5)
WBC # FLD AUTO: 6.68 K/UL — SIGNIFICANT CHANGE UP (ref 3.8–10.5)

## 2022-04-15 PROCEDURE — 99284 EMERGENCY DEPT VISIT MOD MDM: CPT

## 2022-04-15 RX ORDER — APIXABAN 2.5 MG/1
5 TABLET, FILM COATED ORAL ONCE
Refills: 0 | Status: COMPLETED | OUTPATIENT
Start: 2022-04-15 | End: 2022-04-15

## 2022-04-15 RX ORDER — SODIUM CHLORIDE 9 MG/ML
1000 INJECTION INTRAMUSCULAR; INTRAVENOUS; SUBCUTANEOUS ONCE
Refills: 0 | Status: COMPLETED | OUTPATIENT
Start: 2022-04-15 | End: 2022-04-15

## 2022-04-15 RX ORDER — CHLORPROMAZINE HCL 10 MG
50 TABLET ORAL ONCE
Refills: 0 | Status: COMPLETED | OUTPATIENT
Start: 2022-04-15 | End: 2022-04-15

## 2022-04-15 RX ORDER — DIVALPROEX SODIUM 500 MG/1
1000 TABLET, DELAYED RELEASE ORAL ONCE
Refills: 0 | Status: COMPLETED | OUTPATIENT
Start: 2022-04-15 | End: 2022-04-15

## 2022-04-15 RX ORDER — QUETIAPINE FUMARATE 200 MG/1
200 TABLET, FILM COATED ORAL ONCE
Refills: 0 | Status: COMPLETED | OUTPATIENT
Start: 2022-04-15 | End: 2022-04-15

## 2022-04-15 RX ADMIN — SODIUM CHLORIDE 1000 MILLILITER(S): 9 INJECTION INTRAMUSCULAR; INTRAVENOUS; SUBCUTANEOUS at 23:32

## 2022-04-15 RX ADMIN — Medication 50 MILLIGRAM(S): at 23:51

## 2022-04-15 RX ADMIN — QUETIAPINE FUMARATE 200 MILLIGRAM(S): 200 TABLET, FILM COATED ORAL at 23:51

## 2022-04-15 RX ADMIN — APIXABAN 5 MILLIGRAM(S): 2.5 TABLET, FILM COATED ORAL at 23:56

## 2022-04-15 RX ADMIN — DIVALPROEX SODIUM 1000 MILLIGRAM(S): 500 TABLET, DELAYED RELEASE ORAL at 23:51

## 2022-04-15 NOTE — ED PROVIDER NOTE - ATTENDING CONTRIBUTION TO CARE
sara: pt recently admitted for rectal bleeding, followed over 3 days with admission following hgb, consistently stable and was dc for outpt gi after seen by gi in the hospital.  pt with psych illness: apparently is hx of schizoaffective/ bipolar/ personality disorder/ intellectual disability lives in a group home with legal guardian gisell [phone].  pt reportedly came to ed for rectal bleeding.  pt states she has a sz disorder on depakote, when asked to move in bed for rectal exam said her legs were too weak to move.  pt states that her leg weakness followed a seizure and she is supposed to have a walker that was not delivered to her group home.  pt with decreased reflexes (difficult exam) quads both sides, but is able to move legs.  rectal exam, no blood, (witnessed by nurse laurence) pt with flat affect.  attempted to call guardian, left a message. need collateral    I performed a history and physical exam of the patient and discussed their management with the resident and /or advanced care provider. I reviewed the resident and /or ACP's note and agree with the documented findings and plan of care. My medical decison making and observations are found above.

## 2022-04-15 NOTE — ED PROVIDER NOTE - CLINICAL SUMMARY MEDICAL DECISION MAKING FREE TEXT BOX
sara: pt recently admitted for rectal bleeding, followed over 3 days with admission following hgb, consistently stable and was dc for outpt gi after seen by gi in the hospital.  pt with psych illness: apparently is hx of schizoaffective/ bipolar/ personality disorder/ intellectual disability lives in a group home with legal guardian gisell [phone].  pt reportedly came to ed for rectal bleeding.  pt states she has a sz disorder on depakote, when asked to move in bed for rectal exam said her legs were too weak to move.  pt states that her leg weakness followed a seizure and she is supposed to have a walker that was not delivered to her group home.  pt with decreased reflexes (difficult exam) quads both sides, but is able to move legs.  rectal exam, no blood, (witnessed by nurse laurence) pt with flat affect.  attempted to call guardian, left a message. sara: pt recently admitted for rectal bleeding, followed over 3 days with admission following hgb, consistently stable and was dc for outpt gi after seen by gi in the hospital.  pt with psych illness: apparently is hx of schizoaffective/ bipolar/ personality disorder/ intellectual disability lives in a group home with legal guardian gisell [phone].  pt reportedly came to ed for rectal bleeding.  pt states she has a sz disorder on depakote, when asked to move in bed for rectal exam said her legs were too weak to move.  pt states that her leg weakness followed a seizure and she is supposed to have a walker that was not delivered to her group home.  pt with decreased reflexes (difficult exam) quads both sides, but is able to move legs.  rectal exam, no blood, (witnessed by nurse laurence) pt with flat affect.  attempted to call guardian, left a message.    called  is her group home- nathaniel sara: pt recently admitted for rectal bleeding, followed over 3 days with admission following hgb, consistently stable and was dc for outpt gi after seen by gi in the hospital.  pt with psych illness: apparently is hx of schizoaffective/ bipolar/ personality disorder/ intellectual disability lives in a group home with legal guardian gisell [phone].  pt reportedly came to ed for rectal bleeding.  pt states she has a sz disorder on depakote, when asked to move in bed for rectal exam said her legs were too weak to move.  pt states that her leg weakness followed a seizure and she is supposed to have a walker that was not delivered to her group home.  pt with decreased reflexes (difficult exam) quads both sides, but is able to move legs.  rectal exam, no blood, good tone. (witnessed by nurse laurence) pt with flat affect.  attempted to call guardian, left a message.    called  is her group home- nathaniel: a week ago pt was able to walk normally.  seen by pt on 4/11, (75% patient effort as per PT) was using rolling walker.  as per nathaniel, pt with difficulty getting from living room to bed and needed staff support to get her there.  a marked change from before as per nathaniel

## 2022-04-15 NOTE — ED PROVIDER NOTE - NS ED ROS FT
Gen: Denies fever.   HEENT: Denies headache. Denies congestion.  CV: Denies chest pain. Denies lightheadedness.  Skin: Denies rash.   Resp: Denies SOB. Denies cough.  GI: +abd pain. Denies nausea. Denies vomiting. Denies diarrhea. Denies melena. +hematochezia.  Msk: Denies extremity swelling. Denies extremity pain.  : Denies dysuria. Denies hematuria.  Neuro: Denies LOC. Denies dizziness. Denies new numbness/tingling. Denies new focal weakness.  Psych: Denies SI

## 2022-04-15 NOTE — ED ADULT NURSE NOTE - OBJECTIVE STATEMENT
pt A&ox4, came to ED for rectal bleeding. pt states since last week shes been having blood in stool, pt states she is on eliqius for factor 5 clotting disorder. abdomen is soft, non tender, and round. pt denies N/V/D. pt denies Chest pain and SOB. pt states she feels tired and weak. NSR on telemetry. breathing is spontaneous and unlabored. sating 99% on RA. bilateral pedal and radial pulses palpable and strong.  Bed in lowest position, call bell within reach, all other safety and comfort measures provided. awaiting labs results and further orders.

## 2022-04-15 NOTE — ED ADULT NURSE NOTE - CHIEF COMPLAINT QUOTE
Patient brought to ER by EMS from home for c/o rectal bleeding since Sunday. Was seen at [facility] and given medication to slow bleeding. But now it is back after slowing down with abdominal pain and a headache. Pt takes Eliquis (Factor 5 Leiden deficiency)

## 2022-04-15 NOTE — ED PROVIDER NOTE - PHYSICAL EXAMINATION
Gen: A&Ox4   HEENT: Atraumatic. Mucous membranes moist, no scleral icterus.  CV: RRR. No significant lower extremity edema.   Resp: Respirations unlabored. CTAB, no rales, no wheezes.  GI: Abdomen non tender to palpation, soft and non-distended.   Skin/MSK: No open wounds. No ecchymosis appreciated.  Neuro: Following commands. EOMI. Pupils ERRL.  Psych: Appropriate mood, cooperative

## 2022-04-15 NOTE — ED PROVIDER NOTE - PATIENT PORTAL LINK FT
You can access the FollowMyHealth Patient Portal offered by [facility] by registering at the following website: [email or web address]. By joining [email or web address]’s FollowMyHealth portal, you will also be able to view your health information using other applications (apps) compatible with our system.

## 2022-04-15 NOTE — PROVIDER CONTACT NOTE (OTHER) - ASSESSMENT
Writer informed by MD Bass that pt is medically cleared for discharge at this time. SW assistance in arrangement for pt transport back to [facility] was requested.  Writer contacted [facility] at [phone]. Writer spoke with staff member, Teresa, who was informed of pt's discharge. Pt to travel INDEPENDENTLY via AMBULANCE for safety. Writer contacted [facility] #[phone] for arrangement of AMBULANCE. Writer spoke with Nataly who provided reservation #[id number]. Trip pending with [facility]. Verbal huddle completed with interdisciplinary team. Writer informed by MD Bass that pt is medically cleared for discharge at this time. SW assistance in arrangement for pt transport back to [facility] was requested.  Writer contacted [facility] at [phone]. Writer spoke with staff member, Teresa, who was informed of pt's discharge. Pt to travel INDEPENDENTLY via AMBULANCE for safety. Writer contacted [facility] #[phone] for arrangement of AMBULANCE. Writer spoke with Andrew who provided reservation #[id number]. Trip pending with [facility]. Verbal huddle completed with interdisciplinary team.

## 2022-04-15 NOTE — ED ADULT TRIAGE NOTE - CHIEF COMPLAINT QUOTE
Patient brought to ER by EMS from home for c/o rectal bleeding since Sunday. Was seen at [facility] and given medication to slow bleeding. But now it is back after slowing down with abdominal pain and a headache. Pt takes Eliquis (Factor 5 Leiden deficiency)

## 2022-04-15 NOTE — ED PROVIDER NOTE - NSFOLLOWUPINSTRUCTIONS_ED_ALL_ED_FT
discussed with nathaniel at group home.  will give depakote 1000, seraquel 200, thorazine 50, eliquis 5.    you came for rectal bleeding, rectal exam showed no acute cbleeding and your blood count is good as are your vital signs.  you need to follow up with the outpatient gastroenterologist as stated from your last discharge.    you need to follow up with physical therapy as directed.    You are being discharged from the Emergency Department after evaluation of your presenting problem.  You were found not to have an emergency that requires hospitalization or surgery, but this does not mean you do not have a health concern.  You should follow up with your primary care physician and any other physicians suggested at time of discharge.  Also, if your condition worsens or changes know that the emergency department is open and available 24 hours a day/ 7 days a week and you should return to us if you have concerns. Thank you for allowing us to participate in your care.

## 2022-04-15 NOTE — ED PROVIDER NOTE - OBJECTIVE STATEMENT
37 yo female with PMHx DVT on Eliquis (Factor V Leiden deficiency), seizures, and bipolar disorder, living in group home, recently admitted for rectal bleeding - suspected 2/2 to hemorrhoids per chart review - pt discharged home 2 days ago, presenting to ED for rectal bleeding. Also reports abd pain - similar to presentation w/ previous visit per pt. H&H stable throughout their stay - discharged to group home yesterday. reports red blood on stool and in bowel after BMs. No N/V. Denies fevers or abd pain.

## 2022-04-16 ENCOUNTER — EMERGENCY (EMERGENCY)
Facility: HOSPITAL | Age: 37
LOS: 0 days | Discharge: HOME | End: 2022-04-16
Attending: STUDENT IN AN ORGANIZED HEALTH CARE EDUCATION/TRAINING PROGRAM
Payer: MEDICARE

## 2022-04-16 VITALS
WEIGHT: 240.08 LBS | RESPIRATION RATE: 16 BRPM | SYSTOLIC BLOOD PRESSURE: 112 MMHG | OXYGEN SATURATION: 98 % | DIASTOLIC BLOOD PRESSURE: 73 MMHG | TEMPERATURE: 98 F | HEART RATE: 103 BPM | HEIGHT: 68 IN

## 2022-04-16 VITALS
TEMPERATURE: 98 F | SYSTOLIC BLOOD PRESSURE: 116 MMHG | HEART RATE: 93 BPM | DIASTOLIC BLOOD PRESSURE: 77 MMHG | RESPIRATION RATE: 16 BRPM | OXYGEN SATURATION: 100 %

## 2022-04-16 VITALS
DIASTOLIC BLOOD PRESSURE: 76 MMHG | OXYGEN SATURATION: 98 % | RESPIRATION RATE: 18 BRPM | SYSTOLIC BLOOD PRESSURE: 112 MMHG | HEART RATE: 98 BPM | TEMPERATURE: 99 F

## 2022-04-16 DIAGNOSIS — M54.9 DORSALGIA, UNSPECIFIED: ICD-10-CM

## 2022-04-16 DIAGNOSIS — R05.1 ACUTE COUGH: ICD-10-CM

## 2022-04-16 DIAGNOSIS — R51.9 HEADACHE, UNSPECIFIED: ICD-10-CM

## 2022-04-16 DIAGNOSIS — Z88.8 ALLERGY STATUS TO OTHER DRUGS, MEDICAMENTS AND BIOLOGICAL SUBSTANCES STATUS: ICD-10-CM

## 2022-04-16 DIAGNOSIS — F31.9 BIPOLAR DISORDER, UNSPECIFIED: ICD-10-CM

## 2022-04-16 DIAGNOSIS — K92.1 MELENA: ICD-10-CM

## 2022-04-16 DIAGNOSIS — Z88.5 ALLERGY STATUS TO NARCOTIC AGENT: ICD-10-CM

## 2022-04-16 DIAGNOSIS — J30.2 OTHER SEASONAL ALLERGIC RHINITIS: ICD-10-CM

## 2022-04-16 DIAGNOSIS — Z79.01 LONG TERM (CURRENT) USE OF ANTICOAGULANTS: ICD-10-CM

## 2022-04-16 DIAGNOSIS — Z91.018 ALLERGY TO OTHER FOODS: ICD-10-CM

## 2022-04-16 DIAGNOSIS — G47.00 INSOMNIA, UNSPECIFIED: ICD-10-CM

## 2022-04-16 DIAGNOSIS — Z86.718 PERSONAL HISTORY OF OTHER VENOUS THROMBOSIS AND EMBOLISM: ICD-10-CM

## 2022-04-16 DIAGNOSIS — Z88.0 ALLERGY STATUS TO PENICILLIN: ICD-10-CM

## 2022-04-16 DIAGNOSIS — D68.2 HEREDITARY DEFICIENCY OF OTHER CLOTTING FACTORS: ICD-10-CM

## 2022-04-16 DIAGNOSIS — N80.9 ENDOMETRIOSIS, UNSPECIFIED: ICD-10-CM

## 2022-04-16 DIAGNOSIS — E03.9 HYPOTHYROIDISM, UNSPECIFIED: ICD-10-CM

## 2022-04-16 DIAGNOSIS — Z20.822 CONTACT WITH AND (SUSPECTED) EXPOSURE TO COVID-19: ICD-10-CM

## 2022-04-16 DIAGNOSIS — K21.9 GASTRO-ESOPHAGEAL REFLUX DISEASE WITHOUT ESOPHAGITIS: ICD-10-CM

## 2022-04-16 DIAGNOSIS — R09.81 NASAL CONGESTION: ICD-10-CM

## 2022-04-16 LAB
RAPID RVP RESULT: SIGNIFICANT CHANGE UP
SARS-COV-2 RNA SPEC QL NAA+PROBE: SIGNIFICANT CHANGE UP

## 2022-04-16 PROCEDURE — 99284 EMERGENCY DEPT VISIT MOD MDM: CPT

## 2022-04-16 RX ORDER — ACETAMINOPHEN 500 MG
650 TABLET ORAL ONCE
Refills: 0 | Status: COMPLETED | OUTPATIENT
Start: 2022-04-16 | End: 2022-04-16

## 2022-04-16 RX ORDER — APIXABAN 2.5 MG/1
5 TABLET, FILM COATED ORAL ONCE
Refills: 0 | Status: COMPLETED | OUTPATIENT
Start: 2022-04-16 | End: 2022-04-16

## 2022-04-16 RX ORDER — IBUPROFEN 200 MG
400 TABLET ORAL ONCE
Refills: 0 | Status: COMPLETED | OUTPATIENT
Start: 2022-04-16 | End: 2022-04-16

## 2022-04-16 RX ORDER — CHLORPROMAZINE HCL 10 MG
50 TABLET ORAL ONCE
Refills: 0 | Status: COMPLETED | OUTPATIENT
Start: 2022-04-16 | End: 2022-04-16

## 2022-04-16 RX ORDER — DIVALPROEX SODIUM 500 MG/1
500 TABLET, DELAYED RELEASE ORAL ONCE
Refills: 0 | Status: COMPLETED | OUTPATIENT
Start: 2022-04-16 | End: 2022-04-16

## 2022-04-16 RX ORDER — QUETIAPINE FUMARATE 200 MG/1
50 TABLET, FILM COATED ORAL ONCE
Refills: 0 | Status: COMPLETED | OUTPATIENT
Start: 2022-04-16 | End: 2022-04-16

## 2022-04-16 RX ORDER — LORATADINE 10 MG/1
10 TABLET ORAL DAILY
Refills: 0 | Status: DISCONTINUED | OUTPATIENT
Start: 2022-04-16 | End: 2022-04-16

## 2022-04-16 RX ADMIN — Medication 650 MILLIGRAM(S): at 18:14

## 2022-04-16 RX ADMIN — LORATADINE 10 MILLIGRAM(S): 10 TABLET ORAL at 20:35

## 2022-04-16 RX ADMIN — APIXABAN 5 MILLIGRAM(S): 2.5 TABLET, FILM COATED ORAL at 21:10

## 2022-04-16 RX ADMIN — Medication 400 MILLIGRAM(S): at 20:35

## 2022-04-16 RX ADMIN — QUETIAPINE FUMARATE 50 MILLIGRAM(S): 200 TABLET, FILM COATED ORAL at 21:09

## 2022-04-16 RX ADMIN — Medication 50 MILLIGRAM(S): at 21:09

## 2022-04-16 RX ADMIN — DIVALPROEX SODIUM 500 MILLIGRAM(S): 500 TABLET, DELAYED RELEASE ORAL at 21:10

## 2022-04-16 NOTE — ED PROVIDER NOTE - PATIENT PORTAL LINK FT
You can access the FollowMyHealth Patient Portal offered by [facility] by registering at the following website: [email or web address]. By joining [employer]’s FollowMyHealth portal, you will also be able to view your health information using other applications (apps) compatible with our system.

## 2022-04-16 NOTE — ED PROVIDER NOTE - NS ED SCRIBE STATEMENT
Patient : Herminia Noel Age: 78 Nose: Nose normal.   Eyes: Conjunctivae, EOM and lids are normal. Pupils are equal, round, and reactive to light.   Neck: Normal range of motion. Neck supple. No tracheal deviation present.   Cardiovascular:   Pulses:       Radial pulses are 2+ on the right side, and 2+ on the left side.   Neurovascularly intact   Pulmonary/Chest: No respiratory distress.   Speaking in full sentences    Musculoskeletal:        Right wrist: She exhibits normal range of motion.        Right hand: She exhibits normal range of motion (wiggles all fingers, able to make a fist around my finger, distal sensation intact) and normal capillary refill.   Neurological: She is alert and oriented to person, place, and time. GCS eye subscore is 4. GCS verbal subscore is 5. GCS motor subscore is 6.   Skin: Skin is warm and dry. Bruising and laceration (superfical) noted.   See attached picture    Psychiatric: She has a normal mood and affect. Her speech is normal and behavior is normal. Judgment and thought content normal. Cognition and memory are normal.   Nursing note and vitals reviewed.          ED Course     Procedures    Lab Results     No results found for this visit on 09/23/18.      Radiology Results     Imaging Results          XR Hand 3+ View Right (Final result)  Result time 09/23/18 15:05:04    Final result                 Impression:    IMPRESSION:    Marked soft tissue swelling over the dorsum of the hand. No fracture is  identified.                    Narrative:    XR HAND 3+ VIEW RIGHT    CLINICAL INFORMATION:  crush injury.        COMPARISON:  None.    TECHNIQUE: 3 views.    FINDINGS:  Marked soft tissue swelling over the dorsum of the hand. No radiopaque  foreign body. No acute fracture. No significant joint space abnormality.  Chronic appearing sclerosis distal phalanx of the fourth digit, benign  appearance.                                      ED Medication Orders (From admission, onward)    None           ED Course as of Sep 23 1516   Sun Sep 23, 2018   1448 XR imaging ordered. Ice applied. Patient declined pain medication    1511 Patient updated on XR findings. Home supportive care discussed. Would like to contact Eliot, her neighbor, called to get a ride home. #[phone]        [facility]     Herminia Noel presented for evaluation of the right hand after sustaining a crush injury. X-ray imaging obtained and negative for any acute osseous findings. Discussed home supportive care including PRICE. Patient knowledge understanding is comfortable with this plan. At time of discharge patient is hemodynamically stable and in no acute distress.Patient was clinically stable and comfortable with being discharged home.  The ED clinical findings, diagnosis, treatment, continued plan of care-discharge instructions and follow up recommendations were discussed with the patient as well as provided in AVS.  Patient understands the diagnosis she was discharged with today is a preliminary diagnosis which can and often do change. Reasons to return to the ED were discussed. Patient was instructed to follow up with PCP in 1 day. The patient was also given pre-cautions related to their diagnosis. The patient verbalizes understanding of all instructions.  Questions regarding these above documented discussions were answered.    Clinical Impression     ED Diagnosis   1. Crushing injury of right hand, initial encounter         Disposition        Discharge 9/23/2018  3:13 PM  Herminia Noel discharge to home/self care.      Patient seen under the supervision of UZMA Zambrano PA-C  09/23/18 [id number]     Attending

## 2022-04-16 NOTE — ED ADULT NURSE NOTE - OBJECTIVE STATEMENT
36 year old female hx asthma and epilepsy c/o rectal bleeding since Saturday, weakness, headaches and feeling cold. Denies any sick contacts, no nausea or vomiting.

## 2022-04-16 NOTE — ED PROVIDER NOTE - OBJECTIVE STATEMENT
Pt is a 36 year old female w/PMH of asthma, seizure, on Depakote, factor 5 Leiden, lives in group home, who presents to the ED today for cold symptoms since Saturday. Pt c/o CP, coughs, nasal congestion, throat pain, back pain, constipation, blood in stool, headaches that started yesterday radiating to her neck. LMP last year because of endometriosis. Denies abdominal pain. Pt received first dose of COVID vaccination. States someone at group home is positive for COVID. Allergy to penicillin, Zofran, tramadol, Toradol. 36F w/ PMH asthma, seizure, Factor V Leiden deficiency, bipolar, endometriosis pw cold-symptoms x 1wk. Pt reports h/a, nasal congestion, throat discomfort, coughing, CP, body aches, constipation, + blood in stool. Denies fever, SOB, abd pain, N/V/D, UTI sx.   Chart reviews shows: Pt seen in  ED yesterday d/t rectal bleeding, s/p recent admission for hemorrhoids, Hgb WNL / stable on labs from 4/15.     PMH as above, PSH non contributory, allergies / meds as per EMR, LMP 2020. Pt received 1st dose COVID vaccine.

## 2022-04-16 NOTE — ED PROVIDER NOTE - CLINICAL SUMMARY MEDICAL DECISION MAKING FREE TEXT BOX
"Informant-    Grazyna is accompanied by mother    Reason for Visit-  Weight Managment     Vitals signs-  /75  Pulse 99  Ht 1.191 m (3' 10.89\")  Wt 34.9 kg (76 lb 15.1 oz)  BMI 24.6 kg/m2    There are concerns about the child's exposure to violence in the home: No    Face to Face time: 5 minutes  Jennifer Kumar MA      " 36 year old female w/PMH of asthma, seizure, factor 5 Leiden, endometriosis, presents to the ED today for cold symptoms x 1 week. Afebrile, vitals stable, well appearing NAD.  Plan: RVP, fluids. 36F w/ PMH of asthma, seizure, factor 5 leiden deficiency, endometriosis pw cold symptoms x 1 week. AFVSS, well appearing, in NAD. Plan: RVP, Tylenol. Re-eval.

## 2022-04-16 NOTE — ED PROVIDER NOTE - CROS ED ENMT ALL NEG
"  [facility]  Adult Nutrition  Consult Note    SUMMARY     Recommendations    Recommendation:   1. Increase TF as tolerated to goal rate of 35ml/hr to provide 1260 kcal, 57g protein, & 654ml free water.    Goals:   Pt will tolerate TF  Nutrition Goal Status: new  Communication of RD Recs: reviewed with RN(Jess)    Reason for Assessment  Reason For Assessment: consult(TF)  Diagnosis: (acute resp failure)  Relevant Medical History: HTN, CAD, CHF, CRF, bladder mass, breast lumpectomy  General Information Comments: Pt receiving Tf of Isosource 1.5 at 20ml/hr. Tolerating per RN. PEG tube. NFPE completed 11/20-mild wasting of temples, arms and legs and moderate wasting of hands and clavicles. No weight loss noted  Nutrition Discharge Planning: pt to d/c on TF    Nutrition Risk Screen  Nutrition Risk Screen: tube feeding or parenteral nutrition    Nutrition/Diet History  Food Preferences: no Zoroastrianism or cultural food prefs identifed  Spiritual, Cultural Beliefs, Mu-ism Practices, Values that Affect Care: no  Factors Affecting Nutritional Intake: NPO    Anthropometrics  Temp: 98.9 °F (37.2 °C)  Height Method: Estimated  Height: 5' 4" (162.6 cm)  Height (inches): 64 in  Weight Method: Bed Scale  Weight: 61.3 kg (135 lb 2.3 oz)  Weight (lb): 135.14 lb  Ideal Body Weight (IBW), Female: 120 lb  % Ideal Body Weight, Female (lb): 114.64 lb  BMI (Calculated): 23.2     Lab/Procedures/Meds  Pertinent Labs Reviewed: reviewed  Pertinent Labs Comments: BUN 54H, Glu 154H, Ca 8.5L, Alb 2.2L, A1C 6.2H  Pertinent Medications Reviewed: reviewed  Pertinent Medications Comments: Vit C, Lasix    Estimated/Assessed Needs  Weight Used For Calorie Calculations: 61.3 kg (135 lb 2.3 oz)  Energy Calorie Requirements (kcal): 1317  Energy Need Method: [place]-St Jeor(x1.3)  Protein Requirements: 61g (1.0g/kg)  Weight Used For Protein Calculations: 61.3 kg (135 lb 2.3 oz)  Estimated Fluid Requirement Method: RDA Method  RDA " - - - Method (mL): 1317     Nutrition Prescription Ordered  Current Diet Order: NPO  Current Nutrition Support Formula Ordered: Isosource 1.5  Current Nutrition Support Rate Ordered: 20 (ml)  Current Nutrition Support Frequency Ordered: ml/hr    Evaluation of Received Nutrient/Fluid Intake  Enteral Calories (kcal): 720  Enteral Protein (gm): 32  Enteral (Free Water) Fluid (mL): 367  % Kcal Needs: 55  % Protein Needs: 52  I/O: 530/1300  Energy Calories Required: not meeting needs  Protein Required: not meeting needs  Fluid Required: not meeting needs  Comments: LBM 11/19  % Intake of Estimated Energy Needs: 50 - 75 %  % Meal Intake: NPO    Nutrition Risk  Level of Risk/Frequency of Follow-up: (2xweekly)     Assessment and Plan  Oropharyngeal dysphagia  Contributing Nutrition Diagnosis  Dysphagia    Related to (etiology):   stroke    Signs and Symptoms (as evidenced by):   NPO, PEG tube    Interventions:  Enteral nutrition    Nutrition Diagnosis Status:   New         Monitor and Evaluation  Food and Nutrient Intake: enteral nutrition intake  Food and Nutrient Adminstration: enteral and parenteral nutrition administration  Physical Activity and Function: nutrition-related ADLs and IADLs  Anthropometric Measurements: weight  Biochemical Data, Medical Tests and Procedures: electrolyte and renal panel  Nutrition-Focused Physical Findings: overall appearance     Malnutrition Assessment  Upper Arm Region (Subcutaneous Fat Loss): mild depletion   [place] Region (Muscle Loss): mild depletion  Clavicle Bone Region (Muscle Loss): moderate depletion  Dorsal Hand (Muscle Loss): moderate depletion  Anterior Thigh Region (Muscle Loss): mild depletion  Posterior Calf Region (Muscle Loss): mild depletion   Subcutaneous Fat Loss (Final Summary): mild protein-calorie malnutrition  Muscle Loss Evaluation (Final Summary): mild protein-calorie malnutrition       Nutrition Follow-Up  RD Follow-up?: Yes

## 2022-04-16 NOTE — ED ADULT NURSE NOTE - NS ED NURSE DISCH DISPOSITION
Pharyngitis (Sore Throat), Report Pending       Pharyngitis (sore throat) is often due to a virus. It can also be caused by the “strep” streptococcus, or strep, bacteria, often called “strep throat” strep throat. Both viral and strep infections can cause throat pain that is worse when swallowing, aching all over with headache, and fever. Both types of infections are contagious. They may be spread by coughing, kissing, or touching others after touching your mouth or nose.  A test has been done to determine whether or not you (or your child, if your child is the patient) have strep throat. Call this facility as directed for the result. If the test is positive for strep infection, treament with antibiotic medications is needed. A prescription can be called in to your pharmacy at that time. If the test is negative, you probably have a viral pharyngitis. This does not require antibiotic treatment. Until you receive the results of the strep test, you should stay home from work. If your child is being tested, he or she should stay home from school.  Home care  · Rest at home. Drink plenty of fluids to avoid dehydration.  · If the test is positive for strep, no work or school for the first 2 days of taking the antibiotics. After this time, you will not be contagious. You can then return to work or school if you are feeling better.   · The antibiotic medication must be taken for the full 10 days, even if you feel better. This is very important to ensure the infection is treated. It is also important to prevent drug-resistent organisms from developing. If you were given an antibiotic shot, no more antibiotics are needed.  · For children: Use acetaminophen for fever, fussiness or discomfort. In infants over six months of age, you may use ibuprofen instead of acetaminophen. (NOTE: If your child has chronic liver or kidney disease or ever had a stomach ulcer or GI bleeding, talk with your doctor before using these medicines.)  (NOTE: Aspirin should never be used in anyone under 18 years of age who is ill with a fever. It may cause severe liver damage.)   · For adults: You may use acetaminophen or ibuprofen to control pain or fever, unless another medicine was prescribed for this. (NOTE: If you have chronic liver or kidney disease or ever had a stomach ulcer or GI bleeding, talk with your doctor before using these medicines.)  · Throat lozenges or numbing throat sprays can help reduce pain. Gargling with warm salt water will also help reduce throat pain. For this, dissolve 1/2 teaspoon of salt in 1 glass of warm water. To help soothe a sore throat, children can sip on juice or a popsicle. Children 5 years and older can also suck on a lollipop or hard candy.  · Avoid salty or spicy foods, which can irritate the throat.  Follow-up care  Follow up with your healthcare provider or our staff if you are not improving over the next week.  When to seek medical advice  Call your healthcare provider right away if any of these occur:  · Fever as directed by your doctor.  For children, seek care if:  ¨ Your child is of any age and has repeated fevers above 104°F (40°C).  ¨ Your child is younger than 2 years of age and has a fever of 100.4°F (38°C) that continues for more than 1 day.  ¨ Your child is 2 years old or older and has a fever of 100.4°F (38°C) that continues for more than 3 days.  · New or worsening ear pain, sinus pain, or headache  · Painful lumps in the back of neck  · Stiff neck  · Lymph nodes are getting larger  · Inability to swallow liquids, excessive drooling, or inability to open mouth wide due to throat pain  · Signs of dehydration (very dark urine or no urine, sunken eyes, dizziness)  · Trouble breathing or noisy breathing  · Muffled voice  · New rash  · Child appears to be getting sicker  © 6527-7047 [employer]. [address] [place] [address], [place] [address]. All rights reserved. This information is not intended as a  substitute for professional medical care. Always follow your healthcare professional's instructions.    Thank you for visiting the [facility] in [place].     It is difficult to recognize all elements of any illness or injury in a single visit. The examination, treatment, and x-rays received are on a preliminary basis only. A radiologist will also review your x-rays for final reading.     Call your primary care provider if you have questions or problems before your next appointment. If you are unable to reach your Primary Care Provider (PCP), please call or return to the Walk-In Clinic. If symptoms worsen or do not resolve please follow-up with your Primary Care Provider (PCP), Walk-In or the nearest Emergency Room for emergency symptoms. If you are unsure of whom to follow up with, call [phone] and ask to speak with either your PCP, the Urgent Care nurse or the on-call Provider if you are calling after hours.     If you are referred to a specialist or scheduled for a test, our Referrals department will call you with your appointment date and time within 3 business days. If you have not heard from them in this time frame, please call [phone] and ask for the Referrals department.     Test results: Unless otherwise instructed, you should be notified of test results within one week. Please call our office if you do not hear from us within this time frame at [phone].     Hours:   Monday through Friday: 7:00 am to 7:00 pm   Saturday: 8:00 am to 2:00 pm   Holiday hours may vary, please call [phone] on Holidays.   Walk-In is closed on Thanksgiving Day, Syd Day and New Year's Day.     Thank you again for visiting [facility] [place], [place].     Shonna Manuel PA-C    Help us to grow our quality of service! We want to improve - and you can help!You may receive a survey in the mail. This is your opportunity to tell us what excellent service you received,  and where we could use improvement. We value your input!     SCHEDULE ONLINE:  Interested in decreasing your wait time in the Walk-in/Urgent Care Clinic?  Same day appointments can now be made.  Go to [email or web address] and scroll down to the green area.  Click on Urgent Care Reservations and make an appointment at the location of your choice.  There you will find the current wait times at each [facility] site.  We will do our best to honor your scheduled appointment time but please understand that wait times are subject to change once you arrive at the clinic.     Discharged

## 2022-04-16 NOTE — ED PROVIDER NOTE - NSFOLLOWUPINSTRUCTIONS_ED_ALL_ED_FT
Allergic Rhinitis, Adult      Allergic rhinitis is a reaction to allergens. Allergens are things that can cause an allergic reaction. This condition affects the lining inside the nose (mucous membrane).    There are two types of allergic rhinitis:  •Seasonal. This type is also called hay fever. It happens only during some times of the year.      •Perennial. This type can happen at any time of the year.      This condition cannot be spread from person to person (is not contagious). It can be mild, worse, or very bad. It can develop at any age and may be outgrown.      What are the causes?     This condition may be caused by:  •Pollen from grasses, trees, and weeds.       •Dust mites.      •Smoke.       •Mold.       •Car fumes.       •The pee (urine), spit, or dander of pets. Dander is dead skin cells from a pet.        What increases the risk?    You are more likely to develop this condition if:  •You have allergies in your family.     •You have problems like allergies in your family. You may have:  •Swelling of parts of your eyes and eyelids.       •Asthma. This affects how you breathe.       •Long-term redness and swelling on your skin.      •Food allergies.          What are the signs or symptoms?    The main symptom of this condition is a runny or stuffy nose (nasal congestion). Other symptoms may include:  •Sneezing or coughing.      •Itching and tearing of your eyes.      •Mucus that drips down the back of your throat (postnasal drip).      •Trouble sleeping.      •Feeling tired.      •Headache.      •Sore throat.        How is this treated?    There is no cure for this condition. You should avoid things that you are allergic to. Treatment can help to relieve symptoms. This may include:  •Medicines that block allergy symptoms, such as corticosteroids or antihistamines. These may be given as a shot, nasal spray, or pill.      •Avoiding things you are allergic to.    •Medicines that give you bits of what you are allergic to over time. This is called immunotherapy. It is done if other treatments do not help. You may get:  •Shots.       •Medicine under your tongue.         •Stronger medicines, if other treatments do not help.        Follow these instructions at home:    Avoiding allergens     Find out what things you are allergic to and avoid them. To do this, try these things:•If you get allergies any time of year:   •Replace carpet with wood, tile, or vinyl andrzej. Carpet can trap pet dander and dust.      •Do not smoke. Do not allow smoking in your home.      •Change your heating and air conditioning filters at least once a month.      •If you get allergies only some times of the year:  •Keep windows closed when you can.       •Plan things to do outside when pollen counts are lowest. Check pollen counts before you plan things to do outside.      •When you come indoors, change your clothes and shower before you sit on furniture or bedding.         •If you are allergic to a pet:  •Keep the pet out of your bedroom.      •Vacuum, sweep, and dust often.        General instructions    •Take over-the-counter and prescription medicines only as told by your doctor.       •Drink enough fluid to keep your pee (urine) pale yellow.      •Keep all follow-up visits as told by your doctor. This is important.        Where to find more information    •American Academy of Allergy, Asthma & Immunology: www.aaaai.org        Contact a doctor if:    •You have a fever.      •You get a cough that does not go away.       •You make whistling sounds when you breathe (wheeze).       •Your symptoms slow you down.       •Your symptoms stop you from doing your normal things each day.        Get help right away if:    •You are short of breath.      This symptom may be an emergency. Do not wait to see if the symptom will go away. Get medical help right away. Call your local emergency services (911 in the [place] Do not drive yourself to the hospital.       Summary    •Allergic rhinitis may be treated by taking medicines and avoiding things you are allergic to.      •If you have allergies only some of the year, keep windows closed when you can at those times.      •Contact your doctor if you get a fever or a cough that does not go away.      This information is not intended to replace advice given to you by your health care provider. Make sure you discuss any questions you have with your health care provider.

## 2022-04-16 NOTE — ED ADULT TRIAGE NOTE - CHIEF COMPLAINT QUOTE
from group home and c/o feeling cold. States was seen at another hospital for rectal bleeding and disch because no active bleeding per pt. also some body aches

## 2022-04-16 NOTE — ED ADULT NURSE NOTE - NSIMPLEMENTINTERV_GEN_ALL_ED
Implemented All Universal Safety Interventions:  [place] to call system. Call bell, personal items and telephone within reach. Instruct patient to call for assistance. Room bathroom lighting operational. Non-slip footwear when patient is off stretcher. Physically safe environment: no spills, clutter or unnecessary equipment. Stretcher in lowest position, wheels locked, appropriate side rails in place.

## 2022-04-18 ENCOUNTER — INPATIENT (INPATIENT)
Facility: HOSPITAL | Age: 37
LOS: 3 days | Discharge: NURSING, REHAB OR LONG-TERM CARE | DRG: 313 | End: 2022-04-22
Attending: INTERNAL MEDICINE | Admitting: INTERNAL MEDICINE
Payer: COMMERCIAL

## 2022-04-18 PROCEDURE — 99285 EMERGENCY DEPT VISIT HI MDM: CPT

## 2022-04-19 VITALS
DIASTOLIC BLOOD PRESSURE: 90 MMHG | OXYGEN SATURATION: 100 % | SYSTOLIC BLOOD PRESSURE: 124 MMHG | HEIGHT: 69 IN | RESPIRATION RATE: 16 BRPM | HEART RATE: 100 BPM | TEMPERATURE: 99 F | WEIGHT: 240.08 LBS

## 2022-04-19 DIAGNOSIS — F25.9 SCHIZOAFFECTIVE DISORDER, UNSPECIFIED: ICD-10-CM

## 2022-04-19 DIAGNOSIS — R07.89 OTHER CHEST PAIN: ICD-10-CM

## 2022-04-19 DIAGNOSIS — E03.9 HYPOTHYROIDISM, UNSPECIFIED: ICD-10-CM

## 2022-04-19 DIAGNOSIS — Z29.9 ENCOUNTER FOR PROPHYLACTIC MEASURES, UNSPECIFIED: ICD-10-CM

## 2022-04-19 DIAGNOSIS — I82.409 ACUTE EMBOLISM AND THROMBOSIS OF UNSPECIFIED DEEP VEINS OF UNSPECIFIED LOWER EXTREMITY: ICD-10-CM

## 2022-04-19 DIAGNOSIS — J45.909 UNSPECIFIED ASTHMA, UNCOMPLICATED: ICD-10-CM

## 2022-04-19 DIAGNOSIS — R07.9 CHEST PAIN, UNSPECIFIED: ICD-10-CM

## 2022-04-19 DIAGNOSIS — R56.9 UNSPECIFIED CONVULSIONS: ICD-10-CM

## 2022-04-19 DIAGNOSIS — F31.9 BIPOLAR DISORDER, UNSPECIFIED: ICD-10-CM

## 2022-04-19 LAB
ALBUMIN SERPL ELPH-MCNC: 3.6 G/DL — SIGNIFICANT CHANGE UP (ref 3.5–5)
ALP SERPL-CCNC: 53 U/L — SIGNIFICANT CHANGE UP (ref 40–120)
ALT FLD-CCNC: 26 U/L DA — SIGNIFICANT CHANGE UP (ref 10–60)
ANION GAP SERPL CALC-SCNC: 6 MMOL/L — SIGNIFICANT CHANGE UP (ref 5–17)
AST SERPL-CCNC: 24 U/L — SIGNIFICANT CHANGE UP (ref 10–40)
BASOPHILS # BLD AUTO: 0.02 K/UL — SIGNIFICANT CHANGE UP (ref 0–0.2)
BASOPHILS NFR BLD AUTO: 0.3 % — SIGNIFICANT CHANGE UP (ref 0–2)
BILIRUB SERPL-MCNC: 0.3 MG/DL — SIGNIFICANT CHANGE UP (ref 0.2–1.2)
BUN SERPL-MCNC: 14 MG/DL — SIGNIFICANT CHANGE UP (ref 7–18)
CALCIUM SERPL-MCNC: 9.4 MG/DL — SIGNIFICANT CHANGE UP (ref 8.4–10.5)
CHLORIDE SERPL-SCNC: 108 MMOL/L — SIGNIFICANT CHANGE UP (ref 96–108)
CO2 SERPL-SCNC: 25 MMOL/L — SIGNIFICANT CHANGE UP (ref 22–31)
CREAT SERPL-MCNC: 0.78 MG/DL — SIGNIFICANT CHANGE UP (ref 0.5–1.3)
D DIMER BLD IA.RAPID-MCNC: 224 NG/ML DDU — SIGNIFICANT CHANGE UP
EGFR: 102 ML/MIN/1.73M2 — SIGNIFICANT CHANGE UP
EOSINOPHIL # BLD AUTO: 0.02 K/UL — SIGNIFICANT CHANGE UP (ref 0–0.5)
EOSINOPHIL NFR BLD AUTO: 0.3 % — SIGNIFICANT CHANGE UP (ref 0–6)
GLUCOSE SERPL-MCNC: 81 MG/DL — SIGNIFICANT CHANGE UP (ref 70–99)
HCG SERPL-ACNC: <1 MIU/ML — SIGNIFICANT CHANGE UP
HCT VFR BLD CALC: 36.3 % — SIGNIFICANT CHANGE UP (ref 34.5–45)
HGB BLD-MCNC: 11.7 G/DL — SIGNIFICANT CHANGE UP (ref 11.5–15.5)
IMM GRANULOCYTES NFR BLD AUTO: 0.4 % — SIGNIFICANT CHANGE UP (ref 0–1.5)
LYMPHOCYTES # BLD AUTO: 2.81 K/UL — SIGNIFICANT CHANGE UP (ref 1–3.3)
LYMPHOCYTES # BLD AUTO: 37.3 % — SIGNIFICANT CHANGE UP (ref 13–44)
MCHC RBC-ENTMCNC: 29.8 PG — SIGNIFICANT CHANGE UP (ref 27–34)
MCHC RBC-ENTMCNC: 32.2 GM/DL — SIGNIFICANT CHANGE UP (ref 32–36)
MCV RBC AUTO: 92.6 FL — SIGNIFICANT CHANGE UP (ref 80–100)
MONOCYTES # BLD AUTO: 0.53 K/UL — SIGNIFICANT CHANGE UP (ref 0–0.9)
MONOCYTES NFR BLD AUTO: 7 % — SIGNIFICANT CHANGE UP (ref 2–14)
NEUTROPHILS # BLD AUTO: 4.13 K/UL — SIGNIFICANT CHANGE UP (ref 1.8–7.4)
NEUTROPHILS NFR BLD AUTO: 54.7 % — SIGNIFICANT CHANGE UP (ref 43–77)
NRBC # BLD: 0 /100 WBCS — SIGNIFICANT CHANGE UP (ref 0–0)
PLATELET # BLD AUTO: 142 K/UL — LOW (ref 150–400)
POTASSIUM SERPL-MCNC: 5 MMOL/L — SIGNIFICANT CHANGE UP (ref 3.5–5.3)
POTASSIUM SERPL-SCNC: 5 MMOL/L — SIGNIFICANT CHANGE UP (ref 3.5–5.3)
PROT SERPL-MCNC: 8.2 G/DL — SIGNIFICANT CHANGE UP (ref 6–8.3)
RBC # BLD: 3.92 M/UL — SIGNIFICANT CHANGE UP (ref 3.8–5.2)
RBC # FLD: 14.6 % — HIGH (ref 10.3–14.5)
SARS-COV-2 RNA SPEC QL NAA+PROBE: DETECTED
SODIUM SERPL-SCNC: 139 MMOL/L — SIGNIFICANT CHANGE UP (ref 135–145)
TROPONIN I, HIGH SENSITIVITY RESULT: 3.9 NG/L — SIGNIFICANT CHANGE UP
WBC # BLD: 7.54 K/UL — SIGNIFICANT CHANGE UP (ref 3.8–10.5)
WBC # FLD AUTO: 7.54 K/UL — SIGNIFICANT CHANGE UP (ref 3.8–10.5)

## 2022-04-19 PROCEDURE — 71275 CT ANGIOGRAPHY CHEST: CPT | Mod: 26,MA

## 2022-04-19 PROCEDURE — 71046 X-RAY EXAM CHEST 2 VIEWS: CPT | Mod: 26

## 2022-04-19 RX ORDER — DIVALPROEX SODIUM 500 MG/1
1000 TABLET, DELAYED RELEASE ORAL ONCE
Refills: 0 | Status: COMPLETED | OUTPATIENT
Start: 2022-04-19 | End: 2022-04-19

## 2022-04-19 RX ORDER — PANTOPRAZOLE SODIUM 20 MG/1
40 TABLET, DELAYED RELEASE ORAL
Refills: 0 | Status: DISCONTINUED | OUTPATIENT
Start: 2022-04-19 | End: 2022-04-22

## 2022-04-19 RX ORDER — MORPHINE SULFATE 50 MG/1
4 CAPSULE, EXTENDED RELEASE ORAL ONCE
Refills: 0 | Status: DISCONTINUED | OUTPATIENT
Start: 2022-04-19 | End: 2022-04-19

## 2022-04-19 RX ORDER — ACETAMINOPHEN 500 MG
1000 TABLET ORAL EVERY 8 HOURS
Refills: 0 | Status: COMPLETED | OUTPATIENT
Start: 2022-04-19 | End: 2022-04-22

## 2022-04-19 RX ORDER — QUETIAPINE FUMARATE 200 MG/1
50 TABLET, FILM COATED ORAL ONCE
Refills: 0 | Status: COMPLETED | OUTPATIENT
Start: 2022-04-19 | End: 2022-04-19

## 2022-04-19 RX ORDER — QUETIAPINE FUMARATE 200 MG/1
50 TABLET, FILM COATED ORAL EVERY 12 HOURS
Refills: 0 | Status: DISCONTINUED | OUTPATIENT
Start: 2022-04-19 | End: 2022-04-22

## 2022-04-19 RX ORDER — DIVALPROEX SODIUM 500 MG/1
1500 TABLET, DELAYED RELEASE ORAL AT BEDTIME
Refills: 0 | Status: DISCONTINUED | OUTPATIENT
Start: 2022-04-19 | End: 2022-04-22

## 2022-04-19 RX ORDER — LANOLIN ALCOHOL/MO/W.PET/CERES
3 CREAM (GRAM) TOPICAL AT BEDTIME
Refills: 0 | Status: DISCONTINUED | OUTPATIENT
Start: 2022-04-19 | End: 2022-04-22

## 2022-04-19 RX ORDER — DIPHENHYDRAMINE HCL 50 MG
50 CAPSULE ORAL ONCE
Refills: 0 | Status: COMPLETED | OUTPATIENT
Start: 2022-04-19 | End: 2022-04-19

## 2022-04-19 RX ORDER — DIPHENHYDRAMINE HCL 50 MG
25 CAPSULE ORAL ONCE
Refills: 0 | Status: COMPLETED | OUTPATIENT
Start: 2022-04-19 | End: 2022-04-19

## 2022-04-19 RX ORDER — LEVOTHYROXINE SODIUM 125 MCG
75 TABLET ORAL DAILY
Refills: 0 | Status: DISCONTINUED | OUTPATIENT
Start: 2022-04-19 | End: 2022-04-22

## 2022-04-19 RX ORDER — APIXABAN 2.5 MG/1
5 TABLET, FILM COATED ORAL ONCE
Refills: 0 | Status: COMPLETED | OUTPATIENT
Start: 2022-04-19 | End: 2022-04-19

## 2022-04-19 RX ORDER — ALBUTEROL 90 UG/1
1 AEROSOL, METERED ORAL EVERY 6 HOURS
Refills: 0 | Status: DISCONTINUED | OUTPATIENT
Start: 2022-04-19 | End: 2022-04-22

## 2022-04-19 RX ORDER — APIXABAN 2.5 MG/1
5 TABLET, FILM COATED ORAL EVERY 12 HOURS
Refills: 0 | Status: DISCONTINUED | OUTPATIENT
Start: 2022-04-19 | End: 2022-04-19

## 2022-04-19 RX ORDER — DIVALPROEX SODIUM 500 MG/1
500 TABLET, DELAYED RELEASE ORAL
Refills: 0 | Status: DISCONTINUED | OUTPATIENT
Start: 2022-04-19 | End: 2022-04-22

## 2022-04-19 RX ORDER — HALOPERIDOL DECANOATE 100 MG/ML
5 INJECTION INTRAMUSCULAR EVERY 12 HOURS
Refills: 0 | Status: DISCONTINUED | OUTPATIENT
Start: 2022-04-19 | End: 2022-04-22

## 2022-04-19 RX ORDER — ENOXAPARIN SODIUM 100 MG/ML
100 INJECTION SUBCUTANEOUS EVERY 12 HOURS
Refills: 0 | Status: DISCONTINUED | OUTPATIENT
Start: 2022-04-19 | End: 2022-04-20

## 2022-04-19 RX ORDER — ARIPIPRAZOLE 15 MG/1
30 TABLET ORAL DAILY
Refills: 0 | Status: DISCONTINUED | OUTPATIENT
Start: 2022-04-19 | End: 2022-04-22

## 2022-04-19 RX ADMIN — MORPHINE SULFATE 4 MILLIGRAM(S): 50 CAPSULE, EXTENDED RELEASE ORAL at 14:20

## 2022-04-19 RX ADMIN — Medication 3 MILLIGRAM(S): at 21:14

## 2022-04-19 RX ADMIN — DIVALPROEX SODIUM 1000 MILLIGRAM(S): 500 TABLET, DELAYED RELEASE ORAL at 07:35

## 2022-04-19 RX ADMIN — APIXABAN 5 MILLIGRAM(S): 2.5 TABLET, FILM COATED ORAL at 07:37

## 2022-04-19 RX ADMIN — MORPHINE SULFATE 4 MILLIGRAM(S): 50 CAPSULE, EXTENDED RELEASE ORAL at 05:24

## 2022-04-19 RX ADMIN — Medication 50 MILLIGRAM(S): at 06:04

## 2022-04-19 RX ADMIN — MORPHINE SULFATE 4 MILLIGRAM(S): 50 CAPSULE, EXTENDED RELEASE ORAL at 03:01

## 2022-04-19 RX ADMIN — Medication 25 MILLIGRAM(S): at 14:20

## 2022-04-19 RX ADMIN — MORPHINE SULFATE 4 MILLIGRAM(S): 50 CAPSULE, EXTENDED RELEASE ORAL at 14:50

## 2022-04-19 RX ADMIN — MORPHINE SULFATE 4 MILLIGRAM(S): 50 CAPSULE, EXTENDED RELEASE ORAL at 02:36

## 2022-04-19 RX ADMIN — HALOPERIDOL DECANOATE 5 MILLIGRAM(S): 100 INJECTION INTRAMUSCULAR at 21:15

## 2022-04-19 RX ADMIN — Medication 1 MILLIGRAM(S): at 12:37

## 2022-04-19 RX ADMIN — QUETIAPINE FUMARATE 50 MILLIGRAM(S): 200 TABLET, FILM COATED ORAL at 21:12

## 2022-04-19 RX ADMIN — QUETIAPINE FUMARATE 50 MILLIGRAM(S): 200 TABLET, FILM COATED ORAL at 07:36

## 2022-04-19 RX ADMIN — Medication 25 MILLIGRAM(S): at 10:24

## 2022-04-19 NOTE — ED ADULT NURSE NOTE - ED STAT RN HANDOFF DETAILS
report given to ALEYDA Neri for cont. care , pt not in distress. IV access to left upper arm inserted by Dr Marianne angeles guided.

## 2022-04-19 NOTE — ED ADULT NURSE NOTE - ED STAT RN HANDOFF DETAILS 2
2/2 fluid overload  using cane prior to admission, now with use of walker  followed by PT, activity as sharon. Patient transferred to [facility] for further care by EMS stable in no acute distress , safety maintained.

## 2022-04-19 NOTE — ED PROVIDER NOTE - MUSCULOSKELETAL, MLM
1+ pitting edema in the bilateral lower extremities. Midline and paraspinal thoracic tenderness to palpation. No step offs.

## 2022-04-19 NOTE — H&P ADULT - PROBLEM SELECTOR PLAN 1
-c/w chest pain non radiating non exertional  -DDx: PE (due to failure ox Xarelto in the past) vs cardiac etiology (less likely) vs functional   -Trop neg , ekg NSR twice without any ischemic changes   -f/u trop x2   -D Dimer neg   -Timing of contrast was not appropriate on CTA chest to r/o PE  -Will do another CTA chest tomorrow   -Pt is  on Eliquis for her chronic DVT/ PE   -Reproducible pain on exam   -will obtain ECHO   -Admit to tele   -Will start patient on full dose Lovenox for now unless PE ruled out  -will start on full dose Lovenox unless r/o PE although suspicion is not high give normal D Dimer   -Can consider calling pain management once r/o PE -c/w chest pain non radiating non exertional  -DDx: PE (due to failure ox Xarelto in the past) vs cardiac etiology (less likely) vs functional   -Trop neg , ekg NSR twice without any ischemic changes   -f/u trop x2   -D Dimer neg   -Timing of contrast was not appropriate on CTA chest to r/o PE  -Will do another CTA chest tomorrow   -Pt is  on Eliquis for her chronic DVT/ PE   -Reproducible pain on exam   -will obtain ECHO   -Admit to tele   -Will start patient on full dose Lovenox for now unless PE ruled out  -will start on full dose Lovenox unless r/o PE although suspicion is not high give normal D Dimer   -Can consider calling pain management once r/o PE  -Pulm. Dr Cooney consulted -c/w chest pain non radiating non exertional  -DDx: PE (due to failure ox Xarelto in the past) vs cardiac etiology (less likely) vs functional   -Trop neg , ekg NSR twice without any ischemic changes   -f/u trop x2   -D Dimer neg   -IV is getting infiltrative with IV contrast (three times)   -Ordered urgent VQ scan    -Pt is on Eliquis for her chronic DVT/ PE   -Reproducible pain on exam   -will obtain ECHO   -Admit to tele   -Will start patient on full dose Lovenox for now unless PE ruled out  -will start on full dose Lovenox unless r/o PE although suspicion is not high give normal D Dimer   -Can consider calling pain management once r/o PE  -Pulm. Dr Cooney consulted

## 2022-04-19 NOTE — H&P ADULT - ASSESSMENT
35 yo F, from group home, PMHx of hypothyroidism, schizoaffective disorder, bipolar disorder, mild intellectual disabilities, asthma, endometriosis, DVT/Pe (factor V Leiden def), post pituitary gland mass, seizure( hx of pseudoseizure per records from [facility]) came with chief complain of chest pain, back pain and mild sob since yesterday. Admitted for further care and rule out PE

## 2022-04-19 NOTE — H&P ADULT - ATTENDING COMMENTS
History of Present Illness:   35 yo F, from group home, PMHx of hypothyroidism schizoaffective disorder, bipolar disorder, mild intellectual disabilities asthma, endometriosis, DVT/Pe (factor V Leiden def), post pituitary gland mass, seizure( hx of pseudoseizure per records from [facility]) came with chief complain of chest pain, back pain and mild sob since yesterday. Pt stated that yesterday all of sudden she started having pain in her chest, non radiating, 10/10 in intensity constant , associated with nausea and mils shortness of breadth. Pt said that this is not the first  time she is having this pain . She further stated that it hurts when she takes deep breadth. Pt denied vomiting , diarrhea, abdominal pain , cardiac stents in the past. Per patient she developed DVT/PE on Xarelto and was changed to Eliquis.   seen and examined in er   dw er md, resident    h/o DVt ( factor V Leiden factor)  wa son Xarelto  then changed to eliquis  as per her very much compliant  came with lt sided cp  non radiating  no cough.  no sob, no sweating  pt is in mod pain   awake not in any disstress  except mod lt sided cp  when breaths has lt sided CP     vs stble  pulse ox ok   lungs heart abd ok   labs noted   ct chest with contrast but extravasation happened  only menimal amount went inside blood  so inconclusive   waiting for VQ scan   sq lovenox    venous doppler     has tenderness in lt chest  pain managment

## 2022-04-19 NOTE — H&P ADULT - PROBLEM SELECTOR PLAN 6
On Eliquis  she developed DVT/PE on Xarelto and was changed to Eliquis  will start on full dose Lovenox unless r/o PE although suspicion is not high give normal D Dimer

## 2022-04-19 NOTE — ED ADULT NURSE NOTE - OBJECTIVE STATEMENT
presents to the ED with complaints of chest pain and back pain. Patient reports that she has been experiencing mid upper back pain over the past year, and states that in the last week she has been having intermittent back pain going up to her chest.

## 2022-04-19 NOTE — ED PROVIDER NOTE - CLINICAL SUMMARY MEDICAL DECISION MAKING FREE TEXT BOX
36 year old female with PMHx of hypertension, diabetes mellitus, Factor V Leiden, and a DVT (on Eliquis) presents to the ED with complaints of chest pain and back pain. Will obtain EKG, labs, and CTA chest, and give Morphine for pain. 36 year old female with PMHx of hypertension, diabetes mellitus, Factor V Leiden, and a DVT (on Eliquis) presents to the ED with complaints of chest pain and back pain. Will obtain EKG, labs, and CTA chest, and give Morphine for pain.  ekg nonischemic, trop wnl, ddimer wnl, CXR unremarkable  awaiting CTA chest.  patient signedout to Dr. Be at 730am.

## 2022-04-19 NOTE — H&P ADULT - NSHPPHYSICALEXAM_GEN_ALL_CORE
Vital Signs Last 24 Hrs  T(C): 36.9 (19 Apr 2022 07:00), Max: 37 (19 Apr 2022 00:08)  T(F): 98.4 (19 Apr 2022 07:00), Max: 98.6 (19 Apr 2022 00:08)  HR: 86 (19 Apr 2022 07:00) (86 - 100)  BP: 100/71 (19 Apr 2022 07:00) (100/71 - 124/90)  BP(mean): --  RR: 20 (19 Apr 2022 07:00) (16 - 20)  SpO2: 99% (19 Apr 2022 07:00) (99% - 100%)    GENERAL: NAD  EYES: EOMI, PERRLA,   NECK: Supple, No JVD  CHEST/LUNG: Clear to auscultation b/l  No rales, rhonchi, wheezing   HEART: Regular rate and rhythm; No murmurs, +ve S1 S2 , reproducible chest pain on palpation   ABDOMEN: Soft, Nontender, Nondistended; Bowel sounds present  NERVOUS SYSTEM:  Alert & Oriented X3, no focal neurologic deficit   EXTREMITIES:   No clubbing, cyanosis, or edema

## 2022-04-19 NOTE — ED PROVIDER NOTE - OBJECTIVE STATEMENT
36 year old female with PMHx of hypertension, diabetes mellitus, Factor V Leiden, and a DVT (on Eliquis) presents to the ED with complaints of chest pain and back pain. Patient reports that she has been experiencing mid upper back pain over the past year, and states that in the last week she has been having intermittent back pain going up to her chest. Patient endorses that she developed this pain at around 19:00 tonight which has persisted along with some shortness of breath, and thus she decided to visit the ED to seek evaluation. Patient denies any fevers, cough, or new leg swelling although she has an old DVT on her left leg for which she was started on Eliquis two weeks ago. Patient additionally reports being on antibiotics a few weeks ago for right lower leg cellulitis and developed a wound. Patient denies noting any discharge from the wound, although endorses that she still has some persistent pain to the area. NKDA.

## 2022-04-19 NOTE — ED PROVIDER NOTE - NSICDXPASTMEDICALHX_GEN_ALL_CORE_FT
PAST MEDICAL HISTORY:  Diabetes     DVT (deep venous thrombosis)     Factor V Leiden     HTN (hypertension)

## 2022-04-19 NOTE — ED ADULT NURSE REASSESSMENT NOTE - NS ED NURSE REASSESS COMMENT FT1
Received patient from night RN , went to CT scan, US IV access not working MD Lopes made aware awaiting further orders, all meds administered as ordered.

## 2022-04-19 NOTE — H&P ADULT - NSHPSOCIALHISTORY_GEN_ALL_CORE
denies drinking , smoking and drug abuse  quit drinking many years ago used to drink bottle of wine daily

## 2022-04-19 NOTE — H&P ADULT - PROBLEM SELECTOR PLAN 2
-On quetiapine, aripiprazole and haldol   -Resume home meds -On quetiapine, aripiprazole and haldol   -Pt is also on tizanidine and ramelteon PRN for insomnia > held for now   -Resume other home meds

## 2022-04-19 NOTE — H&P ADULT - HISTORY OF PRESENT ILLNESS
37 yo F, from group home, PMHx of hypothyroidism schizoaffective disorder, bipolar disorder, mild intellectual disabilities asthma, endometriosis, DVT/Pe (factor V Leiden def), post pituitary gland mass, seizure( hx of pseudoseizure per records from [facility]) came with chief complain of chest pain, back pain and mild sob since yesterday. Pt stated that yesterday all of sudden she started having pain in her chest, non radiating, 10/10 in intensity constant , associated with nausea and mils shortness of breadth. Pt said that this is not the first  time she is having this pain . She further stated that it hurts when she takes deep breadth. Pt denied vomiting , diarrhea, abdominal pain , cardiac stents in the past. Per patient she developed DVT/PE on Xarelto and was changed to Eliquis.        ED course: CTA chest was performed but due to infiltration of IV line during the test and contrast was not injected at the appropriate time. D Dimer 224. trop neg. EKG NSR twice. In ED pt kept asking for pain medications and denied taking Tylenol and NSAIDs    37 yo F, from group home, PMHx of hypothyroidism schizoaffective disorder, bipolar disorder, mild intellectual disabilities asthma, endometriosis, DVT/Pe (factor V Leiden def), post pituitary gland mass, seizure( hx of pseudoseizure per records from [facility]) came with chief complain of chest pain, back pain and mild sob since yesterday. Pt stated that yesterday all of sudden she started having pain in her chest, non radiating, 10/10 in intensity constant , associated with nausea and mils shortness of breadth. Pt said that this is not the first  time she is having this pain . She further stated that it hurts when she takes deep breadth. Pt denied vomiting , diarrhea, abdominal pain , cardiac stents in the past. Per patient she developed DVT/PE on Xarelto and was changed to Eliquis.        ED course: CTA chest was performed but every time contrast injected her IV line got infiltrative (happened thrice) . CT dpt recommended to get VQ scan . D Dimer 224. trop neg. EKG NSR twice. In ED pt kept asking for pain medications and denied taking Tylenol and NSAIDs.

## 2022-04-19 NOTE — ED ADULT NURSE REASSESSMENT NOTE - NS ED NURSE REASSESS COMMENT FT1
Pt is alert and oriented x3. Pt is noncooperative and verbally aggressive. Pt c/o pain, requests morphine and benadryl. Pt reports, she takes Depakote 500 mg at night and 1000 mg in the morning and Eliquis 5 mg. Pt refused tylenols, Lovenox, and Depakote. NP made aware.

## 2022-04-20 DIAGNOSIS — K21.9 GASTRO-ESOPHAGEAL REFLUX DISEASE WITHOUT ESOPHAGITIS: ICD-10-CM

## 2022-04-20 DIAGNOSIS — Z29.9 ENCOUNTER FOR PROPHYLACTIC MEASURES, UNSPECIFIED: ICD-10-CM

## 2022-04-20 DIAGNOSIS — K64.9 UNSPECIFIED HEMORRHOIDS: ICD-10-CM

## 2022-04-20 DIAGNOSIS — Z86.718 PERSONAL HISTORY OF OTHER VENOUS THROMBOSIS AND EMBOLISM: ICD-10-CM

## 2022-04-20 DIAGNOSIS — F60.9 PERSONALITY DISORDER, UNSPECIFIED: ICD-10-CM

## 2022-04-20 DIAGNOSIS — N83.202 UNSPECIFIED OVARIAN CYST, LEFT SIDE: ICD-10-CM

## 2022-04-20 DIAGNOSIS — G40.909 EPILEPSY, UNSPECIFIED, NOT INTRACTABLE, WITHOUT STATUS EPILEPTICUS: ICD-10-CM

## 2022-04-20 DIAGNOSIS — Z88.0 ALLERGY STATUS TO PENICILLIN: ICD-10-CM

## 2022-04-20 DIAGNOSIS — F63.9 IMPULSE DISORDER, UNSPECIFIED: ICD-10-CM

## 2022-04-20 DIAGNOSIS — F79 UNSPECIFIED INTELLECTUAL DISABILITIES: ICD-10-CM

## 2022-04-20 DIAGNOSIS — G47.00 INSOMNIA, UNSPECIFIED: ICD-10-CM

## 2022-04-20 DIAGNOSIS — Z79.01 LONG TERM (CURRENT) USE OF ANTICOAGULANTS: ICD-10-CM

## 2022-04-20 DIAGNOSIS — J45.909 UNSPECIFIED ASTHMA, UNCOMPLICATED: ICD-10-CM

## 2022-04-20 DIAGNOSIS — Z02.9 ENCOUNTER FOR ADMINISTRATIVE EXAMINATIONS, UNSPECIFIED: ICD-10-CM

## 2022-04-20 DIAGNOSIS — F31.9 BIPOLAR DISORDER, UNSPECIFIED: ICD-10-CM

## 2022-04-20 DIAGNOSIS — D68.59 OTHER PRIMARY THROMBOPHILIA: ICD-10-CM

## 2022-04-20 DIAGNOSIS — N80.9 ENDOMETRIOSIS, UNSPECIFIED: ICD-10-CM

## 2022-04-20 PROCEDURE — 78580 LUNG PERFUSION IMAGING: CPT | Mod: 26

## 2022-04-20 PROCEDURE — 99223 1ST HOSP IP/OBS HIGH 75: CPT

## 2022-04-20 PROCEDURE — 99232 SBSQ HOSP IP/OBS MODERATE 35: CPT

## 2022-04-20 RX ORDER — APIXABAN 2.5 MG/1
5 TABLET, FILM COATED ORAL EVERY 12 HOURS
Refills: 0 | Status: DISCONTINUED | OUTPATIENT
Start: 2022-04-20 | End: 2022-04-22

## 2022-04-20 RX ORDER — SODIUM CHLORIDE 9 MG/ML
500 INJECTION INTRAMUSCULAR; INTRAVENOUS; SUBCUTANEOUS ONCE
Refills: 0 | Status: COMPLETED | OUTPATIENT
Start: 2022-04-20 | End: 2022-04-20

## 2022-04-20 RX ORDER — KETOROLAC TROMETHAMINE 30 MG/ML
30 SYRINGE (ML) INJECTION ONCE
Refills: 0 | Status: DISCONTINUED | OUTPATIENT
Start: 2022-04-20 | End: 2022-04-20

## 2022-04-20 RX ORDER — CHLORPROMAZINE HCL 10 MG
50 TABLET ORAL ONCE
Refills: 0 | Status: DISCONTINUED | OUTPATIENT
Start: 2022-04-20 | End: 2022-04-20

## 2022-04-20 RX ORDER — OLANZAPINE 15 MG/1
5 TABLET, FILM COATED ORAL ONCE
Refills: 0 | Status: COMPLETED | OUTPATIENT
Start: 2022-04-20 | End: 2022-04-20

## 2022-04-20 RX ORDER — HALOPERIDOL DECANOATE 100 MG/ML
2 INJECTION INTRAMUSCULAR ONCE
Refills: 0 | Status: COMPLETED | OUTPATIENT
Start: 2022-04-20 | End: 2022-04-20

## 2022-04-20 RX ORDER — DIPHENHYDRAMINE HCL 50 MG
50 CAPSULE ORAL ONCE
Refills: 0 | Status: COMPLETED | OUTPATIENT
Start: 2022-04-20 | End: 2022-04-20

## 2022-04-20 RX ORDER — HALOPERIDOL DECANOATE 100 MG/ML
2 INJECTION INTRAMUSCULAR ONCE
Refills: 0 | Status: DISCONTINUED | OUTPATIENT
Start: 2022-04-20 | End: 2022-04-20

## 2022-04-20 RX ORDER — CHLORPROMAZINE HCL 10 MG
50 TABLET ORAL ONCE
Refills: 0 | Status: COMPLETED | OUTPATIENT
Start: 2022-04-20 | End: 2022-04-20

## 2022-04-20 RX ORDER — QUETIAPINE FUMARATE 200 MG/1
50 TABLET, FILM COATED ORAL ONCE
Refills: 0 | Status: COMPLETED | OUTPATIENT
Start: 2022-04-20 | End: 2022-04-20

## 2022-04-20 RX ADMIN — APIXABAN 5 MILLIGRAM(S): 2.5 TABLET, FILM COATED ORAL at 10:51

## 2022-04-20 RX ADMIN — Medication 50 MILLIGRAM(S): at 09:24

## 2022-04-20 RX ADMIN — ARIPIPRAZOLE 30 MILLIGRAM(S): 15 TABLET ORAL at 21:33

## 2022-04-20 RX ADMIN — HALOPERIDOL DECANOATE 2 MILLIGRAM(S): 100 INJECTION INTRAMUSCULAR at 07:48

## 2022-04-20 RX ADMIN — APIXABAN 5 MILLIGRAM(S): 2.5 TABLET, FILM COATED ORAL at 17:05

## 2022-04-20 RX ADMIN — DIVALPROEX SODIUM 500 MILLIGRAM(S): 500 TABLET, DELAYED RELEASE ORAL at 09:25

## 2022-04-20 RX ADMIN — Medication 50 MILLIGRAM(S): at 17:04

## 2022-04-20 RX ADMIN — Medication 2 MILLIGRAM(S): at 10:51

## 2022-04-20 RX ADMIN — HALOPERIDOL DECANOATE 2 MILLIGRAM(S): 100 INJECTION INTRAMUSCULAR at 19:10

## 2022-04-20 RX ADMIN — DIVALPROEX SODIUM 1500 MILLIGRAM(S): 500 TABLET, DELAYED RELEASE ORAL at 21:33

## 2022-04-20 RX ADMIN — HALOPERIDOL DECANOATE 5 MILLIGRAM(S): 100 INJECTION INTRAMUSCULAR at 17:05

## 2022-04-20 RX ADMIN — Medication 50 MILLIGRAM(S): at 20:39

## 2022-04-20 RX ADMIN — SODIUM CHLORIDE 500 MILLILITER(S): 9 INJECTION INTRAMUSCULAR; INTRAVENOUS; SUBCUTANEOUS at 15:46

## 2022-04-20 RX ADMIN — Medication 4 MILLIGRAM(S): at 19:26

## 2022-04-20 RX ADMIN — Medication 50 MILLIGRAM(S): at 19:00

## 2022-04-20 RX ADMIN — Medication 3 MILLIGRAM(S): at 21:33

## 2022-04-20 RX ADMIN — QUETIAPINE FUMARATE 50 MILLIGRAM(S): 200 TABLET, FILM COATED ORAL at 17:05

## 2022-04-20 RX ADMIN — QUETIAPINE FUMARATE 50 MILLIGRAM(S): 200 TABLET, FILM COATED ORAL at 09:25

## 2022-04-20 RX ADMIN — Medication 30 MILLIGRAM(S): at 21:09

## 2022-04-20 RX ADMIN — OLANZAPINE 5 MILLIGRAM(S): 15 TABLET, FILM COATED ORAL at 22:58

## 2022-04-20 RX ADMIN — Medication 1000 MILLIGRAM(S): at 17:03

## 2022-04-20 RX ADMIN — Medication 30 MILLIGRAM(S): at 20:39

## 2022-04-20 RX ADMIN — Medication 1000 MILLIGRAM(S): at 16:09

## 2022-04-20 NOTE — BH CONSULTATION LIAISON ASSESSMENT NOTE - HPI (INCLUDE ILLNESS QUALITY, SEVERITY, DURATION, TIMING, CONTEXT, MODIFYING FACTORS, ASSOCIATED SIGNS AND SYMPTOMS)
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 Psych consult was called for SI after pt was found this morning with telemetry wires wrapped around her neck, stating when questioned that she was looking for attention. Prior to MD arrival, pt received Haldol and Benadryl and was placed on CO 1:1 for safety. Pt seen in ED, found napping but awakening to voice and cooperating with interview. As she had told primary team, pt stated she had wound the cords around her neck "to get attention," because she had asked the hospital to give her medications which were not listed in her chart (Thorazine PRN), "but they wouldn't listen to me." Pt denies wanting to die at the time of her act, and denies having any such thoughts now. Pt says she has made similar gestures before, such as in 2017, when she took an OD of pills to get attention from her biological mother and was psych hospitalized. Pt reports she was dx with psychiatric issues "as a baby" and has spent most of her life in foster care, group homes or living with her adoptive mother, except for a period several years ago when she moved out to try to live independently. However, pt did not do well on her own, and is now re-enrolled in [employer] and has court-appointed legal guardian. Pt says her most important priority in the hospital is to get treated for COVID, after which she wants to return to her residence. She says that at home, her most important coping skills are shopping and listening to music, and the most important person in her life is her biological mother, with whom she has an on-again/off-again relationship. Pt agrees that while in the hospital, if she feels upset or needs attention, she will speak to a member of staff rather than engaging in self-injurious behavior.  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 Psych consult was called for SI after pt was found this morning with telemetry wires wrapped around her neck, stating when questioned that she was looking for attention. Prior to MD arrival, pt received Haldol and Benadryl and was placed on CO 1:1 for safety. Pt seen in ED, found napping but awakening to voice and cooperating with interview. As she had told primary team, pt stated she had wound the cords around her neck "to get attention," because she had asked the hospital to give her medications which were not listed in her chart (Thorazine PRN), "but they wouldn't listen to me." Pt denies wanting to die at the time of her act, and denies having any such thoughts now. Pt says she has made similar gestures before, such as in 2017, when she took an OD of pills to get attention from her biological mother and was psych hospitalized. Pt reports she was dx with psychiatric issues "as a baby" and has spent most of her life in foster care, group homes or living with her adoptive mother, except for a period several years ago when she moved out to try to live independently. However, pt did not do well on her own, and is now re-enrolled in [employer] and has court-appointed legal guardian. Pt says her most important priority in the hospital is to get treated for COVID, after which she wants to return to her residence. She says that at home, her most important coping skills are shopping and listening to music, and the most important person in her life is her biological mother, with whom she has an on-again/off-again relationship. Pt agrees that while in the hospital, if she feels upset or needs attention, she will speak to a member of staff rather than engaging in self-injurious behavior.

## 2022-04-20 NOTE — PROGRESS NOTE ADULT - PROBLEM SELECTOR PLAN 1
possible muscular chest pain  V/Q scan negative  Troponin negative  EKG NSR  D Dimer 224  f/u ECHO  transfer level of care to medicine  Pulmonolgy Dr Cooney consulted possible muscular chest pain  V/Q scan negative  Troponin negative  EKG NSR  D Dimer 224  f/u ECHO  continue telemetry  tachycardiac  NS 500ml IVx1  Pulmonolgy Dr Huertas

## 2022-04-20 NOTE — ED ADULT NURSE REASSESSMENT NOTE - NS ED NURSE REASSESS COMMENT FT1
1039am- Pt states she is having a "panic attack".  Upon assessment, pt resting in bed, making moaning noises, PCA at bedside, 1:1 maintained as per order. No acute distress noted. Lisa SAEZ notified. NP to place ativan order.

## 2022-04-20 NOTE — BH CONSULTATION LIAISON ASSESSMENT NOTE - GENERAL APPEARANCE
[facility]  [address] [facility] [address], [place] [address]  Appointment Phone [phone] Nurse Advisors [phone]    Priyanka Martinez      1970  Wound Dressing Change:Left Ischial Tuberosity  After cleansing with saline or wound cleanser, apply small amount of VASHE on gauze, lay into wound bed, let sit for 10 minutes, remove gauze (do not rinse) then apply dressing.  Lightly pack wound with dry AMD gauze in PM   Lightly pack with silvercel rope in AM  Cover wound with ABD pad and or Tegaderm Adhesive Foam Dressing  Change dressing daily.  Repositioning:   Bed: Reposition pt MINIMALLY every 1-2 hours in bed to relieve pressure and promote perfusion to tissue.   Chair: When up to chair pt should not sit for longer than one hour total before either standing or returning to bed for at least 10 minutes, again to relieve pressure and promote perfusion to tissue.  ?Pt should also sit on a chair cushion when up to the chair.       Za Carter PA-C. February 26, 2019    Call us at [phone] if you have any questions about your wounds, have redness or swelling around your wound, have a fever of 101 or greater or if you have any other problems or concerns. We answer the phone Monday through Friday 8 am to 4 pm, please leave a message as we check the voicemail frequently throughout the day.     Follow up with Provider - 4 weeks with sandra           
No deformities present

## 2022-04-20 NOTE — BH CONSULTATION LIAISON ASSESSMENT NOTE - PROFESSIONAL COLLATERAL NAME
Physical Exam  Mallampati: II  TM Distance: >3 FB  Neck ROM: Full  Cardio Rhythm: Regular  Cardio Rate: Normal  Breath sounds clear to auscultation:  Yes      Anesthesia Plan  ASA Status: 1  emergent  Anesthesia Type: General  Induction: Intravenous  Preferred Airway Type: ETT  Reviewed: NPO Status, Beta Blocker Status, Lab Results, Past Med History, Problem List and Allergies  Plan Comments: Last ate a granola bar at 0830  Premed with scopolamine patch and reglan      ROS/Med Hx  
Care manager Ronny Philippe

## 2022-04-20 NOTE — BH CONSULTATION LIAISON ASSESSMENT NOTE - SUMMARY
36F, single and domiciled in [facility] group home, with MHx of obesity, HTN, asthma, endometriosis, Factor V Leiden, hypothyroidism, pituitary mass, seizure disorder (suspected PNES) and insomnia, and PHx of intellectual disability and SAD vs bipolar DO with h/o multiple IP psych hospitalizations (most recently [facility] in 10/2020) and countless ED presentations at multiple hospitals for SAs, SIBs, attention-seeking behavior and medical complaints, BIBEMS from group home on 4/19 c/o CP back pain and mild SOB x1d, found to be COVID+ (asymptomatic). Psych consult was called for SI after pt was found this morning with telemetry wires wrapped around her neck, stating when questioned that she was looking for attention. On exam, pt presents calm, cooperative and in NAD, denying SI/HI/AVH and again attributing her act this morning to seeking attention. While clearly intellectually limited and concrete in her thought process, pt has sufficient insight into her own behavior to acknowledge that she frequently engages in similar attention-seeking behavior. Given her hx of SIB and limited coping skills, pt appears to be in need of continued CO 1:1 observation for safety during her hospital stay. She also appears likely to benefit from creating a behavior plan together with hospital staff, which would establish a system of incentives for calm, organized behavior and appropriate help seeking (eg snacks or privileges). Pt should also be restarted on all her home medications. Goal is to facilitate safe DC back to pt's residence as soon as she is medically optimized. Pt does not appear to present an acute risk of harm to self or others at the time of assessment, and does not appear to be in need of admission to IP psych at the time of assessment.

## 2022-04-20 NOTE — RAPID RESPONSE TEAM SUMMARY - NSSITUATIONBACKGROUNDRRT_GEN_ALL_CORE
37 yo F, from group home, PMHx of hypothyroidism schizoaffective disorder, bipolar disorder, mild intellectual disabilities asthma, endometriosis, DVT/Pe (factor V Leiden def), post pituitary gland mass, seizure( hx of pseudoseizure per records from [facility]) came with chief complain of chest pain, back pain and mild sob since yesterday. Pt stated that yesterday all of sudden she started having pain in her chest, non radiating, 10/10 in intensity constant , associated with nausea and mils shortness of breadth. Pt said that this is not the first  time she is having this pain . She further stated that it hurts when she takes deep breadth. Pt denied vomiting , diarrhea, abdominal pain , cardiac stents in the past. Per patient she developed DVT/PE on Xarelto and was changed to Eliquis.        ED course: CTA chest was performed but every time contrast injected her IV line got infiltrative (happened thrice) . CT dpt recommended to get VQ scan . D Dimer 224. trop neg. EKG NSR twice. In ED pt kept asking for pain medications and denied taking Tylenol and NSAIDs.     Code gray was called when pt was found with cord wrapped around her neck. ED staff tried to reach primary team but no one responded and hence rapid response was initiated. Pt was seen and examined. states that she has chest pain. Pt has flat effect and is minimally verbal. appears in no distress. Pt will get repeat cardiac enzymes, ekg, haldol 2 mg and will be placed on constant observation  35 yo F, from group home, PMHx of hypothyroidism schizoaffective disorder, bipolar disorder, mild intellectual disabilities asthma, endometriosis, DVT/Pe (factor V Leiden def), post pituitary gland mass, seizure( hx of pseudoseizure per records from [facility]) came with chief complain of chest pain, back pain and mild sob since yesterday. Pt stated that yesterday all of sudden she started having pain in her chest, non radiating, 10/10 in intensity constant , associated with nausea and mils shortness of breadth. Pt said that this is not the first  time she is having this pain . She further stated that it hurts when she takes deep breadth. Pt denied vomiting , diarrhea, abdominal pain , cardiac stents in the past. Per patient she developed DVT/PE on Xarelto and was changed to Eliquis.        ED course: CTA chest was performed but every time contrast injected her IV line got infiltrative (happened thrice) . CT dpt recommended to get VQ scan . D Dimer 224. trop neg. EKG NSR twice. In ED pt kept asking for pain medications and denied taking Tylenol and NSAIDs.     Code gray was called when pt was found with cord wrapped around her neck. shortly after code gray the rapid response was initiated so patient could be examined by primary team. Pt was seen and examined by RRT team. Pt states that she has chest pain. Pt has flat affect and is minimally verbal. appears in no distress. Pt will get repeat cardiac enzymes, ekg, haldol 2 mg and will be placed on constant observation

## 2022-04-20 NOTE — BH CONSULTATION LIAISON ASSESSMENT NOTE - NSBHCHARTREVIEWVS_PSY_A_CORE FT
Vital Signs Last 24 Hrs  T(C): 36.9 (20 Apr 2022 16:00), Max: 37.2 (20 Apr 2022 14:08)  T(F): 98.4 (20 Apr 2022 16:00), Max: 98.9 (20 Apr 2022 14:08)  HR: 87 (20 Apr 2022 16:00) (81 - 123)  BP: 119/79 (20 Apr 2022 16:00) (105/73 - 123/80)  BP(mean): --  RR: 17 (20 Apr 2022 16:00) (17 - 18)  SpO2: 100% (20 Apr 2022 16:00) (97% - 100%)

## 2022-04-20 NOTE — PATIENT PROFILE ADULT - FALL HARM RISK - HARM RISK INTERVENTIONS

## 2022-04-20 NOTE — BH CONSULTATION LIAISON ASSESSMENT NOTE - OTHER PAST PSYCHIATRIC HISTORY (INCLUDE DETAILS REGARDING ONSET, COURSE OF ILLNESS, INPATIENT/OUTPATIENT TREATMENT)
PHx: Intellectual disability, SAD vs bipolar disorder  IP: Multiple, most recent [facility] 2020  OP: Receives MH care through [facility] ([facility] agency), psychiatrist Carmen Weiner -[phone]  Rx (per pt): Haldol 5 mg BID, Seroquel 50 mg q8h, Abilify 30 mg qd, Depakote ER 1000 mg qam/500 mg qhs, Benadryl PO 25 mg q6h PRN, Ativan 0.5 mg PO q8h PRN anxiety/panic, Thorazine 50 mg PO PRN agitation

## 2022-04-20 NOTE — ED ADULT NURSE REASSESSMENT NOTE - NS ED NURSE REASSESS COMMENT FT1
Pt is alert and oriented x3. Pt is verbally aggressive, and agitated. Pt refused lab, meds, vital signs. Pt is requesting ativan. Code grey initiated. NP made aware.

## 2022-04-20 NOTE — PROGRESS NOTE ADULT - ASSESSMENT
seen and examined vsstable afebrile physical done ok  no complaints  no cp or sob event notd  pt had panic attack  later on pt calm down    chest pain much better    awake alert  not in nay distress     lt chest much better  labs noted  vq scan negative  covid pos  but no symptoms  Oxygen sat is ok   h/o PE  on eliquis  asthma stable   psych follow up

## 2022-04-20 NOTE — PATIENT PROFILE ADULT - OVER THE PAST TWO WEEKS HAVE YOU FELT DOWN, DEPRESSED OR HOPELESS?
Patient deines feeling depressed and any thoughts of harming herself or others. She is presently on constant observation as she was reportedlt found with cor/no

## 2022-04-20 NOTE — BH CONSULTATION LIAISON ASSESSMENT NOTE - RISK ASSESSMENT
Risk factors: Intellectual disability, impulse control disorder, SAD, multiple medical problems, lives in group home, h/o multiple episodes of SIB. Protective factors: Absent h/o active SI/SA/SIB, stable domicile in group home, supportive family, help seeking and cooperative with care. Acute risk level appears low at the time of assessment, but chronic risk may be mildly elevated due to above risk factors.

## 2022-04-20 NOTE — BH CONSULTATION LIAISON ASSESSMENT NOTE - PRIOR MEDICATION SIDE EFFECTS OR ADVERSE REACTIONS
You can access the FollowMyHealth Patient Portal offered by [facility] by registering at the following website: [email or web address]. By joining [facility]’s FollowMyHealth portal, you will also be able to view your health information using other applications (apps) compatible with our system.
Unable to assess

## 2022-04-20 NOTE — PROGRESS NOTE ADULT - ASSESSMENT
Patient is a 36 year old Female, from group home, PMHx of hypothyroidism, schizoaffective disorder, bipolar disorder, mild intellectual disabilities, asthma, endometriosis, DVT/Pe (factor V Leiden def), post pituitary gland mass, seizure( hx of pseudoseizure per records from [facility] Patient came with chief complain of chest pain, back pain and mild sob since yesterday. Patient admitted to telemetry for chest pain for possible PE. Patient A+OX3, chest pain palpable to right upper chest, denies trauma, shortness of breath, difficulty breathing, stridor. CTA chest inconclusive for PE due to timing of contrast. V/Q scan negative for PE. Patient on constant observation for risk for self harm. Patient attempted to wrap wires around neck. Patient is depressed because her mother is in the hospital in [place] for cancer and pneumonia. Patient affect flat, anxious and difficult to get history. Patient denies Suicidal ideation and attempt, and states she was seeking attention. Psychiatry consulted for self harm.   Patient is a 36 year old Female, from group home, PMHx of hypothyroidism, schizoaffective disorder, bipolar disorder, mild intellectual disabilities, asthma, endometriosis, DVT/Pe (factor V Leiden def), post pituitary gland mass, seizure( hx of pseudoseizure per records from [facility]). Patient came with chief complain of chest pain, back pain and mild sob since yesterday. Patient admitted to telemetry for chest pain for possible PE. COVID positive, Patient A+OX3, chest pain palpable to right upper chest, denies trauma, shortness of breath, difficulty breathing, stridor. CTA chest inconclusive for PE due to timing of contrast. V/Q scan negative for PE. Patient on constant observation for risk for self harm. Patient attempted to wrap wires around neck. Patient is depressed because her mother is in the hospital in [place] for cancer and pneumonia. Patient affect flat, anxious and difficult to get history. Patient denies Suicidal ideation and attempt, and states she was seeking attention. Psychiatry consulted for self harm.

## 2022-04-20 NOTE — BH CONSULTATION LIAISON ASSESSMENT NOTE - NSBHCONSULTRECOMMENDOTHER_PSY_A_CORE FT
1. Continue CO 1:1 for safety, given h/o multiple incidents of SIB  2. C/w home psych meds as reported by pt: Haldol 5 mg BID, Seroquel 50 mg q8h, Abilify 30 mg qd, Depakote ER 1000 mg qam/500 mg qhs, Benadryl PO 25 mg q6h PRN, Ativan 0.5 mg PO q8h PRN anxiety/panic, Thorazine 50 mg PO PRN agitation  --Please note, pt is an [facility] client with legal guardian, so her meds CANNOT be changed without the guardian's approval  --Guardian is Maria Elena Armijo, [phone]  3. Medical management as directed by primary team  4. Psychiatry will continue to follow  5. Call placed to pt's psychiatrist Dr. Weiner ([phone]), awaiting response  6. Case d/w NP Huma of primary team    Fela Martinez MD  Director, Consultation-Liaison Psychiatry Service  [id number]       1. Continue CO 1:1 for safety, given h/o multiple incidents of SIB  2. C/w home psych meds as reported by pt: Haldol 5 mg BID, Seroquel 50 mg q8h, Abilify 30 mg qd, Depakote ER 1000 mg qam/500 mg qhs, Benadryl PO 25 mg q6h PRN, Ativan 0.5 mg PO q8h PRN anxiety/panic, Thorazine 50 mg PO PRN agitation  --Please note, pt is an [facility] client with legal guardian, so her meds CANNOT be changed without the guardian's approval  --Guardian is Maria Elena Armijo, [phone]  3. Recommend hospital staff (eg 5N nurse manager) work with pt to create written behavioral plan which will establish incentives for calm behavior/help-seeking such as snacks and privileges  4. Medical management as directed by primary team  5. Psychiatry will continue to follow  6. Call placed to pt's psychiatrist Dr. Weiner ([phone]), awaiting response  7. Case d/w NP Huma of primary team    Fela Martinez MD  Director, Consultation-Liaison Psychiatry Service  [phone]

## 2022-04-20 NOTE — BH CONSULTATION LIAISON ASSESSMENT NOTE - ADDITIONAL DETAILS ALL
Wrapped telemetry leads around her neck in hospital on morning of 4/20/22, but states that this was not an SA but a bid for attention. Per pt and collateral, has h/o many SIBs without suicidal intent (taking pills, banging her head into the wall) which were also attention-seeking in nature. Pt denies any true SAs.

## 2022-04-20 NOTE — BH CONSULTATION LIAISON ASSESSMENT NOTE - CURRENT MEDICATION
MEDICATIONS  (STANDING):  acetaminophen     Tablet .. 1000 milliGRAM(s) Oral every 8 hours  ALBUTerol    90 MICROgram(s) HFA Inhaler 1 Puff(s) Inhalation every 6 hours  apixaban 5 milliGRAM(s) Oral every 12 hours  ARIPiprazole 30 milliGRAM(s) Oral daily  diphenhydrAMINE Injectable 50 milliGRAM(s) IV Push once  diVALproex DR 1500 milliGRAM(s) Oral at bedtime  diVALproex  milliGRAM(s) Oral <User Schedule>  haloperidol     Tablet 5 milliGRAM(s) Oral every 12 hours  levothyroxine 75 MICROGram(s) Oral daily  melatonin 3 milliGRAM(s) Oral at bedtime  pantoprazole    Tablet 40 milliGRAM(s) Oral before breakfast  QUEtiapine 50 milliGRAM(s) Oral every 12 hours    MEDICATIONS  (PRN):

## 2022-04-21 LAB
ANION GAP SERPL CALC-SCNC: 7 MMOL/L — SIGNIFICANT CHANGE UP (ref 5–17)
BUN SERPL-MCNC: 13 MG/DL — SIGNIFICANT CHANGE UP (ref 7–18)
CALCIUM SERPL-MCNC: 9.2 MG/DL — SIGNIFICANT CHANGE UP (ref 8.4–10.5)
CHLORIDE SERPL-SCNC: 110 MMOL/L — HIGH (ref 96–108)
CO2 SERPL-SCNC: 25 MMOL/L — SIGNIFICANT CHANGE UP (ref 22–31)
CREAT SERPL-MCNC: 0.76 MG/DL — SIGNIFICANT CHANGE UP (ref 0.5–1.3)
EGFR: 105 ML/MIN/1.73M2 — SIGNIFICANT CHANGE UP
GLUCOSE SERPL-MCNC: 95 MG/DL — SIGNIFICANT CHANGE UP (ref 70–99)
HCT VFR BLD CALC: 31.9 % — LOW (ref 34.5–45)
HGB BLD-MCNC: 10.3 G/DL — LOW (ref 11.5–15.5)
MCHC RBC-ENTMCNC: 30.2 PG — SIGNIFICANT CHANGE UP (ref 27–34)
MCHC RBC-ENTMCNC: 32.3 GM/DL — SIGNIFICANT CHANGE UP (ref 32–36)
MCV RBC AUTO: 93.5 FL — SIGNIFICANT CHANGE UP (ref 80–100)
NRBC # BLD: 0 /100 WBCS — SIGNIFICANT CHANGE UP (ref 0–0)
PLATELET # BLD AUTO: 135 K/UL — LOW (ref 150–400)
POTASSIUM SERPL-MCNC: 3.9 MMOL/L — SIGNIFICANT CHANGE UP (ref 3.5–5.3)
POTASSIUM SERPL-SCNC: 3.9 MMOL/L — SIGNIFICANT CHANGE UP (ref 3.5–5.3)
RBC # BLD: 3.41 M/UL — LOW (ref 3.8–5.2)
RBC # FLD: 14.6 % — HIGH (ref 10.3–14.5)
SODIUM SERPL-SCNC: 142 MMOL/L — SIGNIFICANT CHANGE UP (ref 135–145)
WBC # BLD: 4.84 K/UL — SIGNIFICANT CHANGE UP (ref 3.8–10.5)
WBC # FLD AUTO: 4.84 K/UL — SIGNIFICANT CHANGE UP (ref 3.8–10.5)

## 2022-04-21 PROCEDURE — 99233 SBSQ HOSP IP/OBS HIGH 50: CPT

## 2022-04-21 RX ORDER — CHLORPROMAZINE HCL 10 MG
50 TABLET ORAL EVERY 6 HOURS
Refills: 0 | Status: DISCONTINUED | OUTPATIENT
Start: 2022-04-21 | End: 2022-04-21

## 2022-04-21 RX ORDER — HALOPERIDOL DECANOATE 100 MG/ML
2 INJECTION INTRAMUSCULAR ONCE
Refills: 0 | Status: COMPLETED | OUTPATIENT
Start: 2022-04-21 | End: 2022-04-21

## 2022-04-21 RX ORDER — CHLORPROMAZINE HCL 10 MG
50 TABLET ORAL EVERY 8 HOURS
Refills: 0 | Status: DISCONTINUED | OUTPATIENT
Start: 2022-04-21 | End: 2022-04-21

## 2022-04-21 RX ORDER — DIPHENHYDRAMINE HCL 50 MG
50 CAPSULE ORAL EVERY 6 HOURS
Refills: 0 | Status: DISCONTINUED | OUTPATIENT
Start: 2022-04-21 | End: 2022-04-22

## 2022-04-21 RX ORDER — DIPHENHYDRAMINE HCL 50 MG
25 CAPSULE ORAL EVERY 6 HOURS
Refills: 0 | Status: DISCONTINUED | OUTPATIENT
Start: 2022-04-21 | End: 2022-04-21

## 2022-04-21 RX ORDER — DIPHENHYDRAMINE HCL 50 MG
25 CAPSULE ORAL ONCE
Refills: 0 | Status: COMPLETED | OUTPATIENT
Start: 2022-04-21 | End: 2022-04-21

## 2022-04-21 RX ORDER — CHLORPROMAZINE HCL 10 MG
50 TABLET ORAL EVERY 6 HOURS
Refills: 0 | Status: DISCONTINUED | OUTPATIENT
Start: 2022-04-21 | End: 2022-04-22

## 2022-04-21 RX ORDER — HALOPERIDOL DECANOATE 100 MG/ML
4 INJECTION INTRAMUSCULAR ONCE
Refills: 0 | Status: COMPLETED | OUTPATIENT
Start: 2022-04-21 | End: 2022-04-21

## 2022-04-21 RX ORDER — CHLORPROMAZINE HCL 10 MG
50 TABLET ORAL ONCE
Refills: 0 | Status: COMPLETED | OUTPATIENT
Start: 2022-04-21 | End: 2022-04-21

## 2022-04-21 RX ADMIN — Medication 50 MILLIGRAM(S): at 13:31

## 2022-04-21 RX ADMIN — Medication 1 MILLIGRAM(S): at 15:34

## 2022-04-21 RX ADMIN — APIXABAN 5 MILLIGRAM(S): 2.5 TABLET, FILM COATED ORAL at 06:45

## 2022-04-21 RX ADMIN — HALOPERIDOL DECANOATE 4 MILLIGRAM(S): 100 INJECTION INTRAMUSCULAR at 16:03

## 2022-04-21 RX ADMIN — HALOPERIDOL DECANOATE 5 MILLIGRAM(S): 100 INJECTION INTRAMUSCULAR at 06:46

## 2022-04-21 RX ADMIN — Medication 1 MILLIGRAM(S): at 16:02

## 2022-04-21 RX ADMIN — APIXABAN 5 MILLIGRAM(S): 2.5 TABLET, FILM COATED ORAL at 17:06

## 2022-04-21 RX ADMIN — Medication 50 MILLIGRAM(S): at 16:57

## 2022-04-21 RX ADMIN — Medication 1 MILLIGRAM(S): at 19:18

## 2022-04-21 RX ADMIN — HALOPERIDOL DECANOATE 2 MILLIGRAM(S): 100 INJECTION INTRAMUSCULAR at 15:34

## 2022-04-21 RX ADMIN — Medication 75 MICROGRAM(S): at 06:46

## 2022-04-21 RX ADMIN — ARIPIPRAZOLE 30 MILLIGRAM(S): 15 TABLET ORAL at 15:05

## 2022-04-21 RX ADMIN — PANTOPRAZOLE SODIUM 40 MILLIGRAM(S): 20 TABLET, DELAYED RELEASE ORAL at 06:45

## 2022-04-21 RX ADMIN — DIVALPROEX SODIUM 500 MILLIGRAM(S): 500 TABLET, DELAYED RELEASE ORAL at 08:48

## 2022-04-21 RX ADMIN — Medication 25 MILLIGRAM(S): at 13:31

## 2022-04-21 RX ADMIN — QUETIAPINE FUMARATE 50 MILLIGRAM(S): 200 TABLET, FILM COATED ORAL at 06:45

## 2022-04-21 RX ADMIN — QUETIAPINE FUMARATE 50 MILLIGRAM(S): 200 TABLET, FILM COATED ORAL at 17:06

## 2022-04-21 RX ADMIN — HALOPERIDOL DECANOATE 5 MILLIGRAM(S): 100 INJECTION INTRAMUSCULAR at 17:06

## 2022-04-21 NOTE — PROGRESS NOTE ADULT - PROBLEM SELECTOR PLAN 6
continue Eliquis- refusing Lovenox

## 2022-04-21 NOTE — ACUTE INTERFACILITY TRANSFER NOTE - HOSPITAL COURSE
HPI:  35 yo F, from group home, PMHx of hypothyroidism schizoaffective disorder, bipolar disorder, mild intellectual disabilities asthma, endometriosis, DVT/Pe (factor V Leiden def), post pituitary gland mass, seizure( hx of pseudoseizure per records from [facility]) came with chief complain of chest pain, back pain and mild sob since yesterday. Pt stated that yesterday all of sudden she started having pain in her chest, non radiating, 10/10 in intensity constant , associated with nausea and mils shortness of breadth. Pt said that this is not the first  time she is having this pain . She further stated that it hurts when she takes deep breadth. Pt denied vomiting , diarrhea, abdominal pain , cardiac stents in the past. Per patient she developed DVT/PE on Xarelto and was changed to Eliquis.        ED course: CTA chest was performed but every time contrast injected her IV line got infiltrative (happened thrice) . CT dpt recommended to get VQ scan . D Dimer 224. trop neg. EKG NSR twice. In ED pt kept asking for pain medications and denied taking Tylenol and NSAIDs.    Pt was montiored on the tele floor and did not have any events. Pt continued to be agitated and aggressive towards the staff. Pt needs to be admitted to in-unit psyh for further management.

## 2022-04-21 NOTE — PROGRESS NOTE ADULT - PROBLEM SELECTOR PLAN 1
possible muscular chest pain  V/Q scan negative  Troponin negative  EKG NSR  D Dimer 224  f/u ECHO  continue telemetry  tachycardiac  NS 500ml IVx1  Pulmonolgy Dr Huertas

## 2022-04-21 NOTE — ACUTE INTERFACILITY TRANSFER NOTE - NS MD INTERFACILITY TRANSFER INST FT
HPI:  35 yo F, from group home, PMHx of hypothyroidism schizoaffective disorder, bipolar disorder, mild intellectual disabilities asthma, endometriosis, DVT/Pe (factor V Leiden def), post pituitary gland mass, seizure( hx of pseudoseizure per records from [facility]) came with chief complain of chest pain, back pain and mild sob since yesterday. Pt stated that yesterday all of sudden she started having pain in her chest, non radiating, 10/10 in intensity constant , associated with nausea and mils shortness of breadth. Pt said that this is not the first  time she is having this pain . She further stated that it hurts when she takes deep breadth. Pt denied vomiting , diarrhea, abdominal pain , cardiac stents in the past. Per patient she developed DVT/PE on Xarelto and was changed to Eliquis.        ED course: CTA chest was performed but every time contrast injected her IV line got infiltrative (happened thrice) . CT dpt recommended to get VQ scan . D Dimer 224. trop neg. EKG NSR twice. In ED pt kept asking for pain medications and denied taking Tylenol and NSAIDs.    Pt was montiored on the tele floor and did not have any events. Pt continued to be agitated and aggressive towards the staff. Pt needs to be admitted to in-unit psyh for further management.

## 2022-04-21 NOTE — PROGRESS NOTE ADULT - ASSESSMENT
Patient is a 36 year old Female, from group home, PMHx of hypothyroidism, schizoaffective disorder, bipolar disorder, mild intellectual disabilities, asthma, endometriosis, DVT/Pe (factor V Leiden def), post pituitary gland mass, seizure( hx of pseudoseizure per records from [facility]). Patient came with chief complain of chest pain, back pain and mild sob since yesterday. Patient admitted to telemetry for chest pain for possible PE. COVID positive, Patient A+OX3, chest pain palpable to right upper chest, denies trauma, shortness of breath, difficulty breathing, stridor. CTA chest inconclusive for PE due to timing of contrast. V/Q scan negative for PE. Patient on constant observation for risk for self harm. Patient attempted to wrap wires around neck. Patient is depressed because her mother is in the hospital in [place] for cancer and pneumonia. Patient affect flat, anxious and difficult to get history. Patient denies Suicidal ideation and attempt, and states she was seeking attention. Psychiatry consulted for self harm.

## 2022-04-21 NOTE — PROGRESS NOTE ADULT - PROBLEM SELECTOR PROBLEM 6
Recurrent deep vein thrombosis (DVT)

## 2022-04-21 NOTE — PROGRESS NOTE ADULT - ASSESSMENT
Patient is a 36 year old Female, from group home, PMHx of hypothyroidism, schizoaffective disorder, bipolar disorder, mild intellectual disabilities, asthma, endometriosis, DVT/Pe (factor V Leiden def), post pituitary gland mass, seizure( hx of pseudoseizure per records from [facility]). Patient came with chief complain of chest pain, back pain and mild sob since yesterday. Patient admitted to telemetry for chest pain for possible PE. COVID positive, Patient A+OX3, chest pain palpable to right upper chest, denies trauma, shortness of breath, difficulty breathing, stridor. CTA chest inconclusive for PE due to timing of contrast. V/Q scan negative for PE. Patient on constant observation for risk for self harm. Patient attempted to wrap wires around neck. Patient is depressed because her mother is in the hospital in [place] for cancer and pneumonia. Patient affect flat, anxious and difficult to get history. Patient denies Suicidal ideation and attempt, and states she was seeking attention. Psychiatry consulted for self harm.

## 2022-04-21 NOTE — PROGRESS NOTE ADULT - ASSESSMENT
seen and examined  comeing out of room  very uncoopertaive   denies CP    vs stable  PO ok  labs noted   mild covid pos  doing ok   psych to f/u    d/c planning   eliquis

## 2022-04-21 NOTE — CHART NOTE - NSCHARTNOTEFT_GEN_A_CORE
Carmine gabby was called at around 7:20 pm. At the time of the assessment pt was standing on the window frame, trying to open the blinds. I talked to the pt and inquired about what was making her upset. She stated that she is upset because she was told she is being sent back to the group home.  I provided reassurance. Pt calmed down a bit.  Ordered 0.5 mg of Ativan IV to be given STAT.  Then ~ 10 min later after being given Ativan, pt tried to lock herself up in the restroom, ordered 25 mg IV of Benadryl to be given STAT.   Pt to continue on 1:1 observation.
Writer requested nurse ask patient for name and number of group home program patient is connected with. Patient stated it is called [facility] ([phone]).   Writer called the telephone number. There was no answer and no ability to leave a voice mail message.  Writer also attempted to reach emergency contact listed in [place] but telephone number is a non-working number.
Went to see pat for pulmonary consult .  Pat is agitated and security is walking with pat in hallway.   Defer exam for now

## 2022-04-21 NOTE — BH CONSULTATION LIAISON PROGRESS NOTE - NSBHCHARTREVIEWLAB_PSY_A_CORE FT
10.3   4.84  )-----------( 135      ( 21 Apr 2022 08:11 )             31.9   04-21    142  |  110<H>  |  13  ----------------------------<  95  3.9   |  25  |  0.76    Ca    9.2      21 Apr 2022 08:11

## 2022-04-21 NOTE — PROGRESS NOTE ADULT - PROBLEM SELECTOR PLAN 2
home tizanidine and ramelteon PRN for insomnia > held for now   continue Seroquel, Abilify and haldol  continue constant observation for self harm  Lorazepam 2mg PO x1 given for anxiety  Psychiatry Dr. Martinez
home tizanidine and ramelteon PRN for insomnia > held for now   continue Seroquel, Abilify and haldol  continue constant observation for self harm  Lorazepam 2mg PO x1 given for anxiety  Psychiatry Dr. Martinez
home tizanidine and ramelteon PRN for insomnia > held for now   continue Seroquel, Abilify and haldol  continue constant observation for self harm  Lorazepam 2mg PO x1 given for anxiety  Psychiatry Dr. Martinez    - guille added for agitation

## 2022-04-21 NOTE — PROGRESS NOTE ADULT - PROBLEM SELECTOR PLAN 3
continue Depakote   no active seizures

## 2022-04-21 NOTE — CONSULT NOTE ADULT - ASSESSMENT
37 yo F, from group home, PMHx of hypothyroidism schizoaffective disorder, bipolar disorder, mild intellectual disabilities asthma, endometriosis, DVT/Pe (factor V Leiden def), post pituitary gland mass, seizure( hx of pseudoseizure per records from [facility]) came with chief complain of chest pain, back pain and mild sob since yesterday,COVID +,sinus tach.  1.Atypical chest pain, no further cardiac work-up needed.  2.DVT-eliquis.  3.Schizoaffective-Psych f/u,cont current meds.  4.Hypothyroidism-synthroid.  5.PPI. 35 yo F, from group home, PMHx of hypothyroidism schizoaffective disorder, bipolar disorder, mild intellectual disabilities asthma, endometriosis, DVT/Pe (factor V Leiden def), post pituitary gland mass, seizure( hx of pseudoseizure per records from [facility]) came with chief complain of chest pain, back pain and mild sob since yesterday,COVID +,sinus tach.  1.Atypical chest pain, no further cardiac work-up needed. Reports has chronic tachycardia.  2.DVT-eliquis.  3.Schizoaffective-Psych f/u,cont current meds.  4.Hypothyroidism-synthroid.  5.PPI.  6.Echocardiogram can be done as outpatient.  7.COVID-symptomatic support.

## 2022-04-21 NOTE — PROGRESS NOTE ADULT - PROBLEM SELECTOR PLAN 8
pending ECHO  needs to be cleared by Psychiatry

## 2022-04-21 NOTE — PROVIDER CONTACT NOTE (OTHER) - ASSESSMENT
Patient has been combative and aggressive with ALEYDA Montes and multiple PCA's. Patient stood on window ledge attempting to open the window. Patient also causing self harm by hitting herself, banging her head against the wall, attempting to lock herself in the bathroom. Security and multiple staff member have been involved to help keep her calm

## 2022-04-21 NOTE — PROGRESS NOTE ADULT - PROBLEM SELECTOR PLAN 4
continue albuterol   not in exacerbation

## 2022-04-21 NOTE — ACUTE INTERFACILITY TRANSFER NOTE - PLAN OF CARE
home tizanidine and ramelteon PRN for insomnia > held for now   continue Seroquel, Abilify and haldol  continue constant observation for self harm  Lorazepam 2mg PO x1 given for anxiety  Psychiatry Dr. Martinez    - guille added for agitation. possible muscular chest pain  V/Q scan negative  Troponin negative  EKG NSR  D Dimer 224  f/u ECHO  continue telemetry  tachycardiac  NS 500ml IVx1  Pulmonolgy Dr Huertas. continue Depakote   no active seizures.

## 2022-04-21 NOTE — CONSULT NOTE ADULT - SUBJECTIVE AND OBJECTIVE BOX
Time of visit:    CHIEF COMPLAINT: Patient is a 36y old  Female who presents with a chief complaint of chest pain (21 Apr 2022 12:39)      HPI:  37 yo F, from group home, PMHx of hypothyroidism schizoaffective disorder, bipolar disorder, mild intellectual disabilities asthma, endometriosis, DVT/Pe (factor V Leiden def), post pituitary gland mass, seizure( hx of pseudoseizure per records from [facility]) came with chief complain of chest pain, back pain and mild sob since yesterday. Pt stated that yesterday all of sudden she started having pain in her chest, non radiating, 10/10 in intensity constant , associated with nausea and mils shortness of breadth. Pt said that this is not the first  time she is having this pain . She further stated that it hurts when she takes deep breadth. Pt denied vomiting , diarrhea, abdominal pain , cardiac stents in the past. Per patient she developed DVT/PE on Xarelto and was changed to Eliquis.        ED course: CTA chest was performed but every time contrast injected her IV line got infiltrative (happened thrice) . CT dpt recommended to get VQ scan . D Dimer 224. trop neg. EKG NSR twice. In ED pt kept asking for pain medications and denied taking Tylenol and NSAIDs.  (19 Apr 2022 12:52)   Patient seen and examined.     PAST MEDICAL & SURGICAL HISTORY:  HTN (hypertension)    Diabetes    DVT (deep venous thrombosis)    Factor V Leiden    No significant past surgical history        Allergies    penicillin (Other)  Toradol (Hives)  trazodone (Other)  Zofran (Rash)    Intolerances        MEDICATIONS  (STANDING):  acetaminophen     Tablet .. 1000 milliGRAM(s) Oral every 8 hours  ALBUTerol    90 MICROgram(s) HFA Inhaler 1 Puff(s) Inhalation every 6 hours  apixaban 5 milliGRAM(s) Oral every 12 hours  ARIPiprazole 30 milliGRAM(s) Oral daily  diVALproex DR 1500 milliGRAM(s) Oral at bedtime  diVALproex  milliGRAM(s) Oral <User Schedule>  haloperidol     Tablet 5 milliGRAM(s) Oral every 12 hours  levothyroxine 75 MICROGram(s) Oral daily  melatonin 3 milliGRAM(s) Oral at bedtime  pantoprazole    Tablet 40 milliGRAM(s) Oral before breakfast  QUEtiapine 50 milliGRAM(s) Oral every 12 hours      MEDICATIONS  (PRN):  chlorproMAZINE    Tablet 50 milliGRAM(s) Oral every 8 hours PRN agitation  diphenhydrAMINE Injectable 25 milliGRAM(s) IV Push every 6 hours PRN Rash and/or Itching  LORazepam     Tablet 0.5 milliGRAM(s) Oral every 8 hours PRN Anxiety/panic   Medications up to date at time of exam.    Medications up to date at time of exam.    FAMILY HISTORY:      SOCIAL HISTORY; From  Group Home. Limited due to uncooperative and poor historian .       REVIEW OF SYSTEMS:    CONSTITUTIONAL:  No fevers, chills.     HEENT:  No runny nose.    CARDIOVASCULAR:  No facial grimace of chest discomfort.     RESPIRATORY:  No cough, nasal congestion.     GASTROINTESTINAL:  No facial grimace of abdominal pain. No vomiting on exam .    GENITOURINARY: No hematuria.     NEUROLOGIC:  No seizures nor weakness.      PHYSICAL EXAMINATION:      Vital Signs Last 24 Hrs  T(C): 36.8 (21 Apr 2022 08:32), Max: 36.9 (20 Apr 2022 16:00)  T(F): 98.3 (21 Apr 2022 08:32), Max: 98.4 (20 Apr 2022 16:00)  HR: 96 (21 Apr 2022 08:32) (85 - 117)  BP: 102/66 (21 Apr 2022 08:32) (101/56 - 119/79)  BP(mean): --  RR: 16 (21 Apr 2022 08:32) (16 - 18)  SpO2: 98% (21 Apr 2022 08:32) (98% - 100%)   (if applicable)    General : Behavioral challenge due to Schizophrenia. Poor historian. No acute distress.     HEENT: Head is normocephalic and atraumatic. No nasal tenderness. Mucous membranes are moist.     NECK: Supple, no palpable adenopathy.    LUNGS: Clear to auscultation bilaterally with no wheezing, rales, or rhonchi. No use of accessory muscle.     HEART: S1 S2 Regular rate and no click/ rub.     ABDOMEN: Soft, nontender, and nondistended. Active bowel sounds.     EXTREMITIES: Can ambulate with no device.     SKIN: Warm and moist. Non diaphoretic .       LABS:                        10.3   4.84  )-----------( 135      ( 21 Apr 2022 08:11 )             31.9     04-21    142  |  110<H>  |  13  ----------------------------<  95  3.9   |  25  |  0.76    Ca    9.2      21 Apr 2022 08:11    MICROBIOLOGY: (if applicable)    RADIOLOGY & ADDITIONAL STUDIES:  EKG:   CXR:  ECHO:    IMPRESSION: 36y Female PAST MEDICAL & SURGICAL HISTORY:  HTN (hypertension)    Diabetes    DVT (deep venous thrombosis)    Factor V Leiden    No significant past surgical history     Impression; This is a 37 Y/O Female from group Home with Asthma but no exacerbation on exam. Patient came with complain of Chest Pain , Back  Pain and Mild SOB. CTA Chest Inconclusive for PE. VQ Scan Negative for PE . 04-19-22 Positive PCR for Covid 19.     Suggestion:   O2 saturation 98% room air. So far saturating good room air.  Isolation; Contact / Droplet.  Continue Albuterol 90 mcg 1 Puff Q 6 Hours.   Enhanced staff supervision .   On Apixaban 5 mg Q 12 Hours.  
CHIEF COMPLAINT:Patient is a 36y old  Female who presents with a chief complaint of chest pain.      HPI:  35 yo F, from group home, PMHx of hypothyroidism schizoaffective disorder, bipolar disorder, mild intellectual disabilities asthma, endometriosis, DVT/Pe (factor V Leiden def), post pituitary gland mass, seizure( hx of pseudoseizure per records from [facility]) came with chief complain of chest pain, back pain and mild sob since yesterday. Pt stated that yesterday all of sudden she started having pain in her chest, non radiating, 10/10 in intensity constant , associated with nausea and mils shortness of breadth. Pt said that this is not the first  time she is having this pain . She further stated that it hurts when she takes deep breadth. Pt denied vomiting , diarrhea, abdominal pain , cardiac stents in the past. Per patient she developed DVT/PE on Xarelto and was changed to Eliquis.        ED course: CTA chest was performed but every time contrast injected her IV line got infiltrative (happened thrice) . CT dpt recommended to get VQ scan . D Dimer 224. trop neg. EKG NSR twice. In ED pt kept asking for pain medications and denied taking Tylenol and NSAIDs.  (19 Apr 2022 12:52)      PAST MEDICAL & SURGICAL HISTORY:  HTN (hypertension)    Diabetes    DVT (deep venous thrombosis)    Factor V Leiden            MEDICATIONS  (STANDING):  acetaminophen     Tablet .. 1000 milliGRAM(s) Oral every 8 hours  ALBUTerol    90 MICROgram(s) HFA Inhaler 1 Puff(s) Inhalation every 6 hours  apixaban 5 milliGRAM(s) Oral every 12 hours  ARIPiprazole 30 milliGRAM(s) Oral daily  diVALproex DR 1500 milliGRAM(s) Oral at bedtime  diVALproex  milliGRAM(s) Oral <User Schedule>  haloperidol     Tablet 5 milliGRAM(s) Oral every 12 hours  levothyroxine 75 MICROGram(s) Oral daily  melatonin 3 milliGRAM(s) Oral at bedtime  pantoprazole    Tablet 40 milliGRAM(s) Oral before breakfast  QUEtiapine 50 milliGRAM(s) Oral every 12 hours    MEDICATIONS  (PRN):  chlorproMAZINE    Tablet 50 milliGRAM(s) Oral every 6 hours PRN agitation  diphenhydrAMINE 25 milliGRAM(s) Oral every 6 hours PRN Rash and/or Itching  LORazepam     Tablet 0.5 milliGRAM(s) Oral every 8 hours PRN Anxiety/panic      FAMILY HISTORY:  No hx of CAD    SOCIAL HISTORY:    [x ] Non-smoker    [x ] Alcohol-denies    Allergies    penicillin (Other)  Toradol (Hives)  trazodone (Other)  Zofran (Rash)    Intolerances    	    REVIEW OF SYSTEMS:  CONSTITUTIONAL: No fever, weight loss, or fatigue  EYES: No eye pain, visual disturbances, or discharge  ENT:  No difficulty hearing, tinnitus, vertigo; No sinus or throat pain  NECK: No pain or stiffness  RESPIRATORY: No cough, wheezing, chills or hemoptysis; No Shortness of Breath  CARDIOVASCULAR: +chest pain,NO palpitations, passing out, dizziness, or leg swelling  GASTROINTESTINAL: No abdominal or epigastric pain. No nausea, vomiting, or hematemesis; No diarrhea or constipation. No melena or hematochezia.  GENITOURINARY: No dysuria, frequency, hematuria, or incontinence  NEUROLOGICAL: No headaches, memory loss, loss of strength, numbness, or tremors  SKIN: No itching, burning, rashes, or lesions   LYMPH Nodes: No enlarged glands  ENDOCRINE: No heat or cold intolerance; No hair loss  MUSCULOSKELETAL: No joint pain or swelling; No muscle, back, or extremity pain  PSYCHIATRIC: No depression, anxiety, mood swings, or difficulty sleeping  HEME/LYMPH: No easy bruising, or bleeding gums  ALLERGY AND IMMUNOLOGIC: No hives or eczema	      PHYSICAL EXAM:  T(C): 36.8 (04-21-22 @ 08:32), Max: 37.2 (04-20-22 @ 14:08)  HR: 96 (04-21-22 @ 08:32) (85 - 123)  BP: 102/66 (04-21-22 @ 08:32) (101/56 - 123/80)  RR: 16 (04-21-22 @ 08:32) (16 - 18)  SpO2: 98% (04-21-22 @ 08:32) (97% - 100%)  Wt(kg): --  I&O's Summary    20 Apr 2022 07:01  -  21 Apr 2022 07:00  --------------------------------------------------------  IN: 0 mL / OUT: 600 mL / NET: -600 mL        Appearance: Normal	  HEENT:   Normal oral mucosa, PERRL, EOMI	  Lymphatic: No lymphadenopathy  Cardiovascular: Normal S1 S2, No JVD, No murmurs, No edema  Respiratory: Lungs clear to auscultation	  Psychiatry: A & O x 3, Mood & affect appropriate  Gastrointestinal:  Soft, Non-tender, + BS	  Skin: No rashes, No ecchymoses, No cyanosis	  Neurologic: Non-focal  Extremities: Normal range of motion, No clubbing, cyanosis or edema  Vascular: Peripheral pulses palpable 2+ bilaterally        ECG:  sinus tach with nl axis  	  	  LABS:	 	      Troponin I, High Sensitivity Result: 3.9 ng/L (04-19-22 @ 05:10)                          10.3   4.84  )-----------( 135      ( 21 Apr 2022 08:11 )             31.9     04-21    142  |  110<H>  |  13  ----------------------------<  95  3.9   |  25  |  0.76    Ca    9.2      21 Apr 2022 08:11      < from: NM Pulmonary Perfusion Scan (04.20.22 @ 10:26) >  ACC: [id number] EXAM:  NM PULM PERFUSION IMG                          PROCEDURE DATE:  04/20/2022          INTERPRETATION:  RADIOPHARMACEUTICAL: 99mTc-MAA       DOSE: 3.1 mCi IV    CLINICAL INFORMATION: 36 year old female with chest pain, Covid positive,   referred to evaluate for pulmonary embolus.    TECHNIQUE: Perfusion images of the lungs were obtained following   administration of Tc-99m-MAA. Images were obtained in the anterior,   posterior, both lateral, and all 4 oblique projections. Thisstudy was   interpreted in conjunction with a chest radiograph of 4/19/2022.    COMPARISON: No previous lung scan for comparison.    FINDINGS: The study demonstrates homogenous tracer distribution in both   lungs. There are no segmental perfusion defects.    IMPRESSION: Normal perfusion scan. No scan evidence of pulmonary embolus.      TRISTAN WHITEHEAD MD; Attending Nuclear Medicine  This document has been electronically signed. Apr 20 2022 10:26AM      ACC: [id number] EXAM:  CT ANGIO CHEST PULM ART [facility]                          PROCEDURE DATE:  04/19/2022          INTERPRETATION:  CTA CHEST    INDICATION: History of hypertension, diabetes, factor V Leiden deficiency   and venous thrombosis, currently on anticoagulation. Chest pain and back   pain with shortness of breath. Evaluate for pulmonary embolus.    TECHNIQUE: Enhanced helical images were obtained of the chest. Coronal   and sagittal images were reconstructed.  Images were obtained after the   uneventful administration of 60 cc of nonionic intravenous contrast   (Omnipaque 350). 40 cc of nonionic intravenous contrast (Omnipaque 350)   was discarded. Maximum intensity projection images were generated.    COMPARISON: Radiograph chest 4/19/2022.    FINDINGS:    Pulmonary Artery:  The exam is nondiagnostic for pulmonary embolus.    Tubes/Lines: None.    Lungs, airways and pleura: The lungs are clear. The airways and pleura   are normal.    Mediastinum: The visualized thyroid gland is normal. There are no   enlarged chest lymph nodes. The esophagus is normal.    The heart is normal in size. No pericardial effusion. The aorta is normal.    Upper Abdomen: The upper abdomen is unremarkable.    Bones And Soft Tissues: The bones are unremarkable.  The soft tissues are   unremarkable.      IMPRESSION:    1.  The exam is nondiagnostic for pulmonary embolus secondary to timing   of the bolus.  2.  The lungs are clear.    --- End of Report ---            DONNY BROCK MD; Attending Radiologist  This document has been electronically signed. Apr 19 2022  9:13AM

## 2022-04-22 ENCOUNTER — TRANSCRIPTION ENCOUNTER (OUTPATIENT)
Age: 37
End: 2022-04-22

## 2022-04-22 VITALS
TEMPERATURE: 98 F | OXYGEN SATURATION: 98 % | DIASTOLIC BLOOD PRESSURE: 73 MMHG | SYSTOLIC BLOOD PRESSURE: 105 MMHG | RESPIRATION RATE: 17 BRPM | HEART RATE: 110 BPM

## 2022-04-22 LAB — SARS-COV-2 RNA SPEC QL NAA+PROBE: SIGNIFICANT CHANGE UP

## 2022-04-22 PROCEDURE — 96374 THER/PROPH/DIAG INJ IV PUSH: CPT

## 2022-04-22 PROCEDURE — 96372 THER/PROPH/DIAG INJ SC/IM: CPT

## 2022-04-22 PROCEDURE — 84484 ASSAY OF TROPONIN QUANT: CPT

## 2022-04-22 PROCEDURE — 84702 CHORIONIC GONADOTROPIN TEST: CPT

## 2022-04-22 PROCEDURE — 85379 FIBRIN DEGRADATION QUANT: CPT

## 2022-04-22 PROCEDURE — A9540: CPT

## 2022-04-22 PROCEDURE — 80048 BASIC METABOLIC PNL TOTAL CA: CPT

## 2022-04-22 PROCEDURE — 96375 TX/PRO/DX INJ NEW DRUG ADDON: CPT

## 2022-04-22 PROCEDURE — 85025 COMPLETE CBC W/AUTO DIFF WBC: CPT

## 2022-04-22 PROCEDURE — 87635 SARS-COV-2 COVID-19 AMP PRB: CPT

## 2022-04-22 PROCEDURE — 71046 X-RAY EXAM CHEST 2 VIEWS: CPT

## 2022-04-22 PROCEDURE — 85027 COMPLETE CBC AUTOMATED: CPT

## 2022-04-22 PROCEDURE — 78580 LUNG PERFUSION IMAGING: CPT

## 2022-04-22 PROCEDURE — 93005 ELECTROCARDIOGRAM TRACING: CPT

## 2022-04-22 PROCEDURE — 80053 COMPREHEN METABOLIC PANEL: CPT

## 2022-04-22 PROCEDURE — 99232 SBSQ HOSP IP/OBS MODERATE 35: CPT

## 2022-04-22 PROCEDURE — 71275 CT ANGIOGRAPHY CHEST: CPT | Mod: MA

## 2022-04-22 PROCEDURE — 36415 COLL VENOUS BLD VENIPUNCTURE: CPT

## 2022-04-22 PROCEDURE — 99285 EMERGENCY DEPT VISIT HI MDM: CPT | Mod: 25

## 2022-04-22 RX ORDER — DIVALPROEX SODIUM 500 MG/1
2 TABLET, DELAYED RELEASE ORAL
Qty: 60 | Refills: 0
Start: 2022-04-22 | End: 2022-05-21

## 2022-04-22 RX ORDER — ALBUTEROL 90 UG/1
1 AEROSOL, METERED ORAL
Qty: 0 | Refills: 0 | DISCHARGE
Start: 2022-04-22

## 2022-04-22 RX ORDER — CHLORPROMAZINE HCL 10 MG
1 TABLET ORAL
Qty: 120 | Refills: 0
Start: 2022-04-22 | End: 2022-05-21

## 2022-04-22 RX ORDER — DIPHENHYDRAMINE HCL 50 MG
25 CAPSULE ORAL ONCE
Refills: 0 | Status: COMPLETED | OUTPATIENT
Start: 2022-04-22 | End: 2022-04-22

## 2022-04-22 RX ORDER — QUETIAPINE FUMARATE 200 MG/1
1 TABLET, FILM COATED ORAL
Qty: 30 | Refills: 0
Start: 2022-04-22 | End: 2022-05-21

## 2022-04-22 RX ORDER — APIXABAN 2.5 MG/1
5 TABLET, FILM COATED ORAL EVERY 12 HOURS
Refills: 0 | Status: DISCONTINUED | OUTPATIENT
Start: 2022-04-22 | End: 2022-04-22

## 2022-04-22 RX ORDER — ACETAMINOPHEN 500 MG
1000 TABLET ORAL ONCE
Refills: 0 | Status: COMPLETED | OUTPATIENT
Start: 2022-04-22 | End: 2022-04-22

## 2022-04-22 RX ORDER — ACETAMINOPHEN 500 MG
1000 TABLET ORAL ONCE
Refills: 0 | Status: DISCONTINUED | OUTPATIENT
Start: 2022-04-22 | End: 2022-04-22

## 2022-04-22 RX ORDER — ACETAMINOPHEN 500 MG
2 TABLET ORAL
Qty: 240 | Refills: 0
Start: 2022-04-22 | End: 2022-05-21

## 2022-04-22 RX ORDER — LIDOCAINE 4 G/100G
1 CREAM TOPICAL EVERY 24 HOURS
Refills: 0 | Status: DISCONTINUED | OUTPATIENT
Start: 2022-04-22 | End: 2022-04-22

## 2022-04-22 RX ORDER — MORPHINE SULFATE 50 MG/1
2.5 CAPSULE, EXTENDED RELEASE ORAL ONCE
Refills: 0 | Status: DISCONTINUED | OUTPATIENT
Start: 2022-04-22 | End: 2022-04-22

## 2022-04-22 RX ORDER — QUETIAPINE FUMARATE 200 MG/1
1 TABLET, FILM COATED ORAL
Qty: 60 | Refills: 0
Start: 2022-04-22 | End: 2022-05-21

## 2022-04-22 RX ORDER — CHLORPROMAZINE HCL 10 MG
1 TABLET ORAL
Qty: 90 | Refills: 0
Start: 2022-04-22 | End: 2022-05-21

## 2022-04-22 RX ORDER — MORPHINE SULFATE 50 MG/1
2 CAPSULE, EXTENDED RELEASE ORAL ONCE
Refills: 0 | Status: DISCONTINUED | OUTPATIENT
Start: 2022-04-22 | End: 2022-04-22

## 2022-04-22 RX ADMIN — Medication 1000 MILLIGRAM(S): at 18:28

## 2022-04-22 RX ADMIN — APIXABAN 5 MILLIGRAM(S): 2.5 TABLET, FILM COATED ORAL at 18:36

## 2022-04-22 RX ADMIN — ARIPIPRAZOLE 30 MILLIGRAM(S): 15 TABLET ORAL at 12:01

## 2022-04-22 RX ADMIN — Medication 1000 MILLIGRAM(S): at 14:02

## 2022-04-22 RX ADMIN — QUETIAPINE FUMARATE 50 MILLIGRAM(S): 200 TABLET, FILM COATED ORAL at 06:23

## 2022-04-22 RX ADMIN — Medication 75 MICROGRAM(S): at 06:23

## 2022-04-22 RX ADMIN — Medication 25 MILLIGRAM(S): at 18:29

## 2022-04-22 RX ADMIN — HALOPERIDOL DECANOATE 5 MILLIGRAM(S): 100 INJECTION INTRAMUSCULAR at 06:22

## 2022-04-22 RX ADMIN — APIXABAN 5 MILLIGRAM(S): 2.5 TABLET, FILM COATED ORAL at 06:21

## 2022-04-22 RX ADMIN — Medication 1000 MILLIGRAM(S): at 13:32

## 2022-04-22 RX ADMIN — MORPHINE SULFATE 4 MILLIGRAM(S): 50 CAPSULE, EXTENDED RELEASE ORAL at 06:17

## 2022-04-22 RX ADMIN — Medication 1 MILLIGRAM(S): at 08:36

## 2022-04-22 RX ADMIN — QUETIAPINE FUMARATE 50 MILLIGRAM(S): 200 TABLET, FILM COATED ORAL at 18:36

## 2022-04-22 RX ADMIN — Medication 50 MILLIGRAM(S): at 07:34

## 2022-04-22 RX ADMIN — HALOPERIDOL DECANOATE 5 MILLIGRAM(S): 100 INJECTION INTRAMUSCULAR at 18:36

## 2022-04-22 RX ADMIN — MORPHINE SULFATE 2.5 MILLIGRAM(S): 50 CAPSULE, EXTENDED RELEASE ORAL at 12:00

## 2022-04-22 RX ADMIN — Medication 50 MILLIGRAM(S): at 12:03

## 2022-04-22 RX ADMIN — Medication 50 MILLIGRAM(S): at 18:36

## 2022-04-22 RX ADMIN — MORPHINE SULFATE 2.5 MILLIGRAM(S): 50 CAPSULE, EXTENDED RELEASE ORAL at 12:30

## 2022-04-22 RX ADMIN — PANTOPRAZOLE SODIUM 40 MILLIGRAM(S): 20 TABLET, DELAYED RELEASE ORAL at 06:26

## 2022-04-22 RX ADMIN — Medication 50 MILLIGRAM(S): at 06:22

## 2022-04-22 RX ADMIN — Medication 25 MILLIGRAM(S): at 14:01

## 2022-04-22 RX ADMIN — DIVALPROEX SODIUM 500 MILLIGRAM(S): 500 TABLET, DELAYED RELEASE ORAL at 09:25

## 2022-04-22 RX ADMIN — Medication 1000 MILLIGRAM(S): at 06:24

## 2022-04-22 NOTE — DISCHARGE NOTE PROVIDER - CARE PROVIDER_API CALL
Ernesto Mukherjee  INTERNAL MEDICINE  [address], [address]  [place] [address]  Phone: [phone]  Fax: [phone]  Follow Up Time:

## 2022-04-22 NOTE — BH CONSULTATION LIAISON PROGRESS NOTE - NSBHCONSULTRECOMMENDOTHER_PSY_A_CORE FT
1. Continue CO 1:1 for safety until DC, given h/o multiple incidents of attention-seeking behavior with potential for self-injury  2. C/w home psych meds as reported by pt: Haldol 5 mg BID, Seroquel 50 mg q8h, Abilify 30 mg qd, Depakote ER 1000 mg qam/500 mg qhs, Thorazine 50 mg IM q6h standing, Benadryl IM 50 mg q6h PRN, Ativan 0.5 mg PO q8h PRN anxiety/panic  --Note that Thorazine IM (normally PRN) has been made standing for now at pt request   --Please note, pt is an [facility] client with legal guardian, so her meds CANNOT be changed without the guardian's approval  --Guardian is Maria Elena Armijo, [phone]  3. Recommend hospital staff (eg 5N nurse manager) work with pt to create written behavioral plan which will establish incentives for calm behavior/help-seeking such as snacks and privileges  4. Medical management as directed by primary team  5. Psychiatry is signing off in anticipation of DC. Reconsult if additional issues arise as inpatient  6. Call placed to pt's psychiatrist Dr. Weiner ([phone]), awaiting response  7. Case d/w MD Mcnulty of primary team    Fela Martinez MD  Director, Consultation-Liaison Psychiatry Service  [id number]      
1. Continue CO 1:1 for safety, given h/o multiple incidents of attention-seeking behavior with potential for self-injury  2. C/w home psych meds as reported by pt: Haldol 5 mg BID, Seroquel 50 mg q8h, Abilify 30 mg qd, Depakote ER 1000 mg qam/500 mg qhs, Thorazine 50 mg IM q6h standing, Benadryl IM 50 mg q6h PRN, Ativan 0.5 mg PO q8h PRN anxiety/panic  --Note that Thorazine IM (normally PRN) has been made standing for now at pt request   --Please note, pt is an [facility] client with legal guardian, so her meds CANNOT be changed without the guardian's approval  --Guardian is Maria Elena Armijo, [phone]  3. Recommend hospital staff (eg 5N nurse manager) work with pt to create written behavioral plan which will establish incentives for calm behavior/help-seeking such as snacks and privileges  4. Medical management as directed by primary team  5. Psychiatry will continue to follow  6. Call placed to pt's psychiatrist Dr. Weiner ([phone]), awaiting response  7. Case d/w MD Mcnulty of primary team    Fela Martinez MD  Director, Consultation-Liaison Psychiatry Service  [id number]

## 2022-04-22 NOTE — DISCHARGE NOTE PROVIDER - NSDCCPCAREPLAN_GEN_ALL_CORE_FT
PRINCIPAL DISCHARGE DIAGNOSIS  Diagnosis: Atypical chest pain  Assessment and Plan of Treatment: You came into the hospital with compalints of chest pain. You had troponin drawn which are cardiac enzymes that tell us if your heart has damage. Those levels were low. You were seen by cardiologist who doesnt think anything else is needed at this time. You were monitored on Telemetry and no events were seen on the monitor. Please follow up with your primary care doctor within 2 weeks of discharge. You are being sent back to the group home to better help you manage your disease.         SECONDARY DISCHARGE DIAGNOSES  Diagnosis: Schizoaffective disorder  Assessment and Plan of Treatment: You have a history of Scizoaffective. We continued your home medication. Please continue to take your medications as prescribed and follow up with your primary care doctor within 2 weeks of discharge. You are being sent back to the group home to better help you manage your disease.       Diagnosis: Asthma  Assessment and Plan of Treatment: You have a history of asthma. We continued your home medication. Please continue to take your medications as prescribed and follow up with your primary care doctor within 2 weeks of discharge. You are being sent back to the group home to better help you manage your disease.       Diagnosis: Seizures  Assessment and Plan of Treatment: You have a history of seizures. We continued your home medication. Please continue to take your medications as prescribed and follow up with your primary care doctor within 2 weeks of discharge. You are being sent back to the group home to better help you manage your disease.       Diagnosis: Hypothyroidism  Assessment and Plan of Treatment: You have a history of hypothyroid. We continued your home medication. Please continue to take your medications as prescribed and follow up with your primary care doctor within 2 weeks of discharge.       PRINCIPAL DISCHARGE DIAGNOSIS  Diagnosis: Atypical chest pain  Assessment and Plan of Treatment: You came into the hospital with compalints of chest pain. You had troponin drawn which are cardiac enzymes that tell us if your heart has damage. Those levels were low. You were seen by cardiologist who doesnt think anything else is needed at this time. You were monitored on Telemetry and no events were seen on the monitor. Please follow up with your primary care doctor within 2 weeks of discharge. You are being sent back to the group home to better help you manage your disease.         SECONDARY DISCHARGE DIAGNOSES  Diagnosis: Schizoaffective disorder  Assessment and Plan of Treatment: You have a history of Scizoaffective. We continued your home medication. Please continue to take your medications as prescribed and follow up with your primary care doctor/psychiatrist within 2 weeks of discharge. You are being sent back to the group home to better help you manage your disease.       Diagnosis: Seizures  Assessment and Plan of Treatment: You have a history of seizures. We continued your home medication. Please continue to take your medications as prescribed and follow up with your primary care doctor within 2 weeks of discharge. You are being sent back to the group home to better help you manage your disease.       Diagnosis: Asthma  Assessment and Plan of Treatment: You have a history of asthma. We continued your home medication. Please continue to take your medications as prescribed and follow up with your primary care doctor within 2 weeks of discharge. You are being sent back to the group home to better help you manage your disease.       Diagnosis: Hypothyroidism  Assessment and Plan of Treatment: You have a history of hypothyroid. We continued your home medication. Please continue to take your medications as prescribed and follow up with your primary care doctor within 2 weeks of discharge.      Diagnosis: 2019 novel coronavirus disease (COVID-19)  Assessment and Plan of Treatment: You tested positive for COVID incidentaly, and were asymptomatic.   Based on your current clinical status and stability, it has been determined that you no longer need hospitalization and can recover while remaining in self-quarantine at home. You should follow the prevention steps below until a healthcare provider or local or state health department says you can return to your normal activities.   1. You should restrict activities outside your home, except for getting medical care.   2. Do not go to work, school, or public areas.   3. Avoid using public transportation, ride-sharing, or taxis.   4. Separate yourself from other people and animals in your home as much as possible.  When you are around other people (e.g., sharing a room or vehicle) you should wear a facemask.  5. Wash your hands often with soap and water for at least 20 seconds, especially after blowing your nose, coughing, or sneezing; going to the bathroom; and before eating or preparing food.  6. Cover your mouth and nose with a tissue when you cough or sneeze. Throw used tissues in a lined trash can. Immediately wash your hands with soap and water for at least 20 seconds  7. High touch surfaces include counters, tabletops, doorknobs, bathroom fixtures, toilets, phones, keyboards, tablets, and bedside tables.  8. Avoid sharing dishes, drinking glasses, cups, eating utensils, towels, or bedding with other people or pets in your home. After using these items, they should be washed thoroughly with soap and water.  You are strongly advised to seek prompt medical attention if your illness worsens or you develop new symptoms like fever or difficulty breathing.     PRINCIPAL DISCHARGE DIAGNOSIS  Diagnosis: Atypical chest pain  Assessment and Plan of Treatment: You came into the hospital with compalints of chest pain. You had troponin drawn which are cardiac enzymes that tell us if your heart has damage. Those levels were low. You were seen by cardiologist who doesnt think anything else is needed at this time. You were monitored on Telemetry and no events were seen on the monitor. Please follow up with your primary care doctor within 2 weeks of discharge. You are being sent back to the group home to better help you manage your disease.         SECONDARY DISCHARGE DIAGNOSES  Diagnosis: Schizoaffective disorder  Assessment and Plan of Treatment: You have a history of Scizoaffective. We continued your home medication. Please continue to take your medications as prescribed and follow up with your primary care doctor/psychiatrist within 2 weeks of discharge. You are being sent back to the group home to better help you manage your disease.       Diagnosis: Asthma  Assessment and Plan of Treatment: You have a history of asthma. We continued your home medication. Please continue to take your medications as prescribed and follow up with your primary care doctor within 2 weeks of discharge. You are being sent back to the group home to better help you manage your disease.       Diagnosis: Seizures  Assessment and Plan of Treatment: You have a history of seizures. We continued your home medication. Please continue to take your medications as prescribed and follow up with your primary care doctor within 2 weeks of discharge. You are being sent back to the group home to better help you manage your disease.       Diagnosis: Hypothyroidism  Assessment and Plan of Treatment: You have a history of hypothyroid. We continued your home medication. Please continue to take your medications as prescribed and follow up with your primary care doctor within 2 weeks of discharge.      Diagnosis: 2019 novel coronavirus disease (COVID-19)  Assessment and Plan of Treatment: You tested positive for COVID incidentaly, and were asymptomatic.   Based on your current clinical status and stability, it has been determined that you no longer need hospitalization and can recover while remaining in self-quarantine at home. You should follow the prevention steps below until a healthcare provider or local or state health department says you can return to your normal activities.   1. You should restrict activities outside your home, except for getting medical care.   2. Do not go to work, school, or public areas.   3. Avoid using public transportation, ride-sharing, or taxis.   4. Separate yourself from other people and animals in your home as much as possible.  When you are around other people (e.g., sharing a room or vehicle) you should wear a facemask.  5. Wash your hands often with soap and water for at least 20 seconds, especially after blowing your nose, coughing, or sneezing; going to the bathroom; and before eating or preparing food.  6. Cover your mouth and nose with a tissue when you cough or sneeze. Throw used tissues in a lined trash can. Immediately wash your hands with soap and water for at least 20 seconds  7. High touch surfaces include counters, tabletops, doorknobs, bathroom fixtures, toilets, phones, keyboards, tablets, and bedside tables.  8. Avoid sharing dishes, drinking glasses, cups, eating utensils, towels, or bedding with other people or pets in your home. After using these items, they should be washed thoroughly with soap and water.  You are strongly advised to seek prompt medical attention if your illness worsens or you develop new symptoms like fever or difficulty breathing.     PRINCIPAL DISCHARGE DIAGNOSIS  Diagnosis: Atypical chest pain  Assessment and Plan of Treatment: You came into the hospital with compalints of chest pain. You had troponin drawn which are cardiac enzymes that tell us if your heart has damage. Those levels were low. You were seen by cardiologist who doesnt think anything else is needed at this time. You were monitored on Telemetry and no events were seen on the monitor. Please follow up with your primary care doctor within 2 weeks of discharge. You are being sent back to the group home to better help you manage your disease.         SECONDARY DISCHARGE DIAGNOSES  Diagnosis: Schizoaffective disorder  Assessment and Plan of Treatment: You have a history of Scizoaffective. We continued your home medication. Please continue to take your medications as prescribed and follow up with your primary care doctor/psychiatrist within 2 weeks of discharge. You are being sent back to the group home to better help you manage your disease.       Diagnosis: Seizures  Assessment and Plan of Treatment: You have a history of seizures. We continued your home medication. Please continue to take your medications as prescribed and follow up with your primary care doctor within 2 weeks of discharge. You are being sent back to the group home to better help you manage your disease.       Diagnosis: Asthma  Assessment and Plan of Treatment: You have a history of asthma. We continued your home medication. Please continue to take your medications as prescribed and follow up with your primary care doctor within 2 weeks of discharge. You are being sent back to the group home to better help you manage your disease.       Diagnosis: Hypothyroidism  Assessment and Plan of Treatment: You have a history of hypothyroid. We continued your home medication. Please continue to take your medications as prescribed and follow up with your primary care doctor within 2 weeks of discharge.      Diagnosis: 2019 novel coronavirus disease (COVID-19)  Assessment and Plan of Treatment: You tested positive for COVID incidentaly, and were asymptomatic.   Based on your current clinical status and stability, it has been determined that you no longer need hospitalization and can recover while remaining in self-quarantine at home. You should follow the prevention steps below until a healthcare provider or local or state health department says you can return to your normal activities.   1. You should restrict activities outside your home, except for getting medical care.   2. Do not go to work, school, or public areas.   3. Avoid using public transportation, ride-sharing, or taxis.   4. Separate yourself from other people and animals in your home as much as possible.  When you are around other people (e.g., sharing a room or vehicle) you should wear a facemask.  5. Wash your hands often with soap and water for at least 20 seconds, especially after blowing your nose, coughing, or sneezing; going to the bathroom; and before eating or preparing food.  6. Cover your mouth and nose with a tissue when you cough or sneeze. Throw used tissues in a lined trash can. Immediately wash your hands with soap and water for at least 20 seconds  7. High touch surfaces include counters, tabletops, doorknobs, bathroom fixtures, toilets, phones, keyboards, tablets, and bedside tables.  8. Avoid sharing dishes, drinking glasses, cups, eating utensils, towels, or bedding with other people or pets in your home. After using these items, they should be washed thoroughly with soap and water.  You are strongly advised to seek prompt medical attention if your illness worsens or you develop new symptoms like fever or difficulty breathing.    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 Continue to take your anticoagulation Eliquis as prescribed. You should follow up with your PCP within 1 week of discharge for monitoring and medication adjustment.

## 2022-04-22 NOTE — DISCHARGE NOTE NURSING/CASE MANAGEMENT/SOCIAL WORK - NSDCPEFALRISK_GEN_ALL_CORE
For information on Fall & Injury Prevention, visit: [email or web address] OR  [email or web address] OR  https://www.cdc.gov/steadi/patient.html

## 2022-04-22 NOTE — BH CONSULTATION LIAISON PROGRESS NOTE - NSBHCHARTREVIEWVS_PSY_A_CORE FT
Vital Signs Last 24 Hrs  T(C): 36.7 (22 Apr 2022 13:30), Max: 37 (22 Apr 2022 04:20)  T(F): 98 (22 Apr 2022 13:30), Max: 98.6 (22 Apr 2022 04:20)  HR: 113 (22 Apr 2022 13:30) (90 - 136)  BP: 121/84 (22 Apr 2022 13:30) (109/75 - 125/85)  BP(mean): --  RR: 18 (22 Apr 2022 13:30) (17 - 18)  SpO2: 100% (22 Apr 2022 13:30) (97% - 100%)
Vital Signs Last 24 Hrs  T(C): 36.8 (21 Apr 2022 08:32), Max: 36.8 (21 Apr 2022 08:32)  T(F): 98.3 (21 Apr 2022 08:32), Max: 98.3 (21 Apr 2022 08:32)  HR: 96 (21 Apr 2022 08:32) (85 - 117)  BP: 102/66 (21 Apr 2022 08:32) (101/56 - 108/72)  BP(mean): --  RR: 16 (21 Apr 2022 08:32) (16 - 18)  SpO2: 98% (21 Apr 2022 08:32) (98% - 100%)

## 2022-04-22 NOTE — PROGRESS NOTE ADULT - PROVIDER SPECIALTY LIST ADULT
Internal Medicine
Internal Medicine
Cardiology
Internal Medicine

## 2022-04-22 NOTE — DISCHARGE NOTE PROVIDER - HOSPITAL COURSE
35 yo F, from group home, PMHx of hypothyroidism schizoaffective disorder, bipolar disorder, mild intellectual disabilities asthma, endometriosis, DVT/Pe (factor V Leiden def), post pituitary gland mass, seizure( hx of pseudoseizure per records from [facility]) came with chief complain of chest pain, back pain and mild sob since yesterday. Pt stated that yesterday all of sudden she started having pain in her chest, non radiating, 10/10 in intensity constant , associated with nausea and mils shortness of breadth. Pt said that this is not the first  time she is having this pain . She further stated that it hurts when she takes deep breadth. Pt denied vomiting , diarrhea, abdominal pain , cardiac stents in the past. Per patient she developed DVT/PE on Xarelto and was changed to Eliquis.      ED course: CTA chest was performed but every time contrast injected her IV line got infiltrative (happened thrice) . CT dpt recommended to get VQ scan . D Dimer 224. trop neg. EKG NSR twice. In ED pt kept asking for pain medications and denied taking Tylenol and NSAIDs.     Pt was evaluated by Psych who recommended continuing her home regiment which was restarted. Pt still continued to be agitated and aggressive towards the staff. C/w home psych meds as reported by pt: Haldol 5 mg BID, Seroquel 50 mg q8h, Abilify 30 mg qd, Depakote ER 1000 mg qam/500 mg qhs, Benadryl PO 25 mg q6h PRN, Ativan 0.5 mg PO q8h PRN anxiety/panic, Thorazine 50 mg PO PRN agitation  --Please note, pt is an [facility] client with legal guardian, so her meds CANNOT be changed without the guardian's approval  --Guardian is Maria Elena Armijo, [phone]     Pt was monitored on tele and did not have any events but had to be discontinued as pt was using the box and wires for self harm. Pt was seen by cardiology for the chest pain, troponin were negative and no indication for echo at this point.   Pt was also found to be covid positive incidentaly and has been asymptomatic. Has not required any medication or oxygen while in the hospital and her Oxygen level has remained over 95.   Patient is stable for discharge. Patient has been advised to follow up as outpatient. Case has been discussed with the attending. This is just a summary of the case. For further information, please refer to patient chart document.   35 yo F, from group home, PMHx of hypothyroidism schizoaffective disorder, bipolar disorder, mild intellectual disabilities asthma, endometriosis, DVT/Pe (factor V Leiden def), post pituitary gland mass, seizure( hx of pseudoseizure per records from [facility]) came with chief complain of chest pain, back pain and mild sob since yesterday. Pt stated that yesterday all of sudden she started having pain in her chest, non radiating, 10/10 in intensity constant , associated with nausea and mils shortness of breadth. Pt said that this is not the first  time she is having this pain . She further stated that it hurts when she takes deep breadth. Pt denied vomiting , diarrhea, abdominal pain , cardiac stents in the past. Per patient she developed DVT/PE on Xarelto and was changed to Eliquis.      ED course: CTA chest was performed but every time contrast injected her IV line got infiltrative (happened thrice) . CT dpt recommended to get VQ scan . D Dimer 224. trop neg. EKG NSR twice. In ED pt kept asking for pain medications and denied taking Tylenol and NSAIDs.     Pt was evaluated by Psych who recommended continuing her home regiment which was restarted. Pt still continued to be agitated and aggressive towards the staff. C/w home psych meds as reported by pt: Haldol 5 mg BID, Seroquel 50 mg q8h, Abilify 30 mg qd, Depakote ER 1000 mg qam/500 mg qhs, Benadryl PO 25 mg q6h PRN, Ativan 0.5 mg PO q8h PRN anxiety/panic, Thorazine 50 mg PO PRN agitation  --Please note, pt is an [employer] client with legal guardian, so her meds CANNOT be changed without the guardian's approval  --Guardian is Maria Elena Armijo, [phone]     Pt was monitored on tele and did not have any events but had to be discontinued as pt was using the box and wires for self harm. Pt was seen by cardiology for the chest pain, troponin were negative and no indication for echo at this point.   Pt was also found to be covid positive incidentaly and has been asymptomatic. Has not required any medication or oxygen while in the hospital and her Oxygen level has remained over 95.   Patient is stable for discharge. Cleared by the medicine and psych team for discharge. Patient has been advised to follow up as outpatient. Case has been discussed with the attending. This is just a summary of the case. For further information, please refer to patient chart document.

## 2022-04-22 NOTE — BH CONSULTATION LIAISON PROGRESS NOTE - CURRENT MEDICATION
MEDICATIONS  (STANDING):  acetaminophen     Tablet .. 1000 milliGRAM(s) Oral every 8 hours  ALBUTerol    90 MICROgram(s) HFA Inhaler 1 Puff(s) Inhalation every 6 hours  apixaban 5 milliGRAM(s) Oral every 12 hours  ARIPiprazole 30 milliGRAM(s) Oral daily  chlorproMAZINE    Injectable 50 milliGRAM(s) IntraMuscular every 6 hours  diVALproex DR 1500 milliGRAM(s) Oral at bedtime  diVALproex  milliGRAM(s) Oral <User Schedule>  haloperidol     Tablet 5 milliGRAM(s) Oral every 12 hours  levothyroxine 75 MICROGram(s) Oral daily  melatonin 3 milliGRAM(s) Oral at bedtime  pantoprazole    Tablet 40 milliGRAM(s) Oral before breakfast  QUEtiapine 50 milliGRAM(s) Oral every 12 hours    MEDICATIONS  (PRN):  diphenhydrAMINE Injectable 50 milliGRAM(s) IntraMuscular every 6 hours PRN Rash and/or Itching  LORazepam     Tablet 1 milliGRAM(s) Oral every 6 hours PRN Agitation  
MEDICATIONS  (STANDING):  ALBUTerol    90 MICROgram(s) HFA Inhaler 1 Puff(s) Inhalation every 6 hours  apixaban 5 milliGRAM(s) Oral every 12 hours  ARIPiprazole 30 milliGRAM(s) Oral daily  chlorproMAZINE    Injectable 50 milliGRAM(s) IntraMuscular every 6 hours  diVALproex DR 1500 milliGRAM(s) Oral at bedtime  diVALproex  milliGRAM(s) Oral <User Schedule>  haloperidol     Tablet 5 milliGRAM(s) Oral every 12 hours  levothyroxine 75 MICROGram(s) Oral daily  lidocaine   4% Patch 1 Patch Transdermal every 24 hours  melatonin 3 milliGRAM(s) Oral at bedtime  pantoprazole    Tablet 40 milliGRAM(s) Oral before breakfast  QUEtiapine 50 milliGRAM(s) Oral every 12 hours    MEDICATIONS  (PRN):  diphenhydrAMINE Injectable 50 milliGRAM(s) IntraMuscular every 6 hours PRN Rash and/or Itching  LORazepam     Tablet 1 milliGRAM(s) Oral every 6 hours PRN Agitation

## 2022-04-22 NOTE — BH CONSULTATION LIAISON PROGRESS NOTE - NSBHFUPINTERVALHXFT_PSY_A_CORE
MD was notified several times today of problematic behavior by pt on 5N. The first time, in mid-morning, MD was informed by phone that pt was screaming and banging her head against the wall. MD recommended pt receive PRN of Thorazine 50 mg PO, which she had reported yesterday that she found helpful, but per staff pt refused to take the medication. MD then recommended the order be changed to IM, but pt refused that also. MD arrived at pt's room around 12 noon to find her sitting on the edge of her bed with PCA nearby. When asked why she had acted out earlier, pt said, "I had all my stuff with me downstairs [in med ED], but when I came up here, they took some of it away." Pt said the items which were removed were her earphones and her 2 chargers, and she very much wanted to have them back so she could listen to music and charge her devices as needed. After a period of discussion between pt, MD, the nurse manager and the assistant nurse manager, the following was agreed to: 1. Pt agreed to take Thorazine 50 mg PO, as long as she received Benadryl 25 mg IVP first. 2. Pt's CO 1:1 order would be modified to specify that it was for safety (ie behavioral management), not SI, so that she could regain access to devices such as headphones which are prohibited for pts at risk of SI. 3. Pt would then receive her earphones, and if she maintained calm behavior with those, consideration would be given to giving pt her chargers as well. While awaiting the medication, pt at one point used her armchair to stand on the window ledge, stating that she was "tired" of waiting for the medication, but MD and asst nurse manager successfully redirected her. MD stayed with pt until her medication was administered and she had received her earphones, then left. MD was notified shortly after 4 pm that pt was acting out again, this time by grabbing a thermometer probe which was on a cart outside her room and forcing it down her throat until she began to gag. When MD arrived at room, pt was sitting quietly on the bed, looking withdrawn and sad (thermometer and other clinical equipment had been removed from the area). MD asked pt what was wrong, and she said, "I want to see my [bio] mom." MD explained that the hospital cannot locate pt's mother or compel her to come to the hospital. Pt then demanded to be DC'd to a shelter. MD explained that this would not be safe or appropriate. Pt then said, "I want you to sedate me. I want all my medications in the IV." Another period of negotiation followed, and pt ultimately agreed to accept Thorazine as IM (IV administration would be impractical, as the medication must be infused at no faster than 1 mg/min). Pt accepted IM Thorazine. Informal meeting was then convened including MD, 5N nursing leadership and [facility] leader Dr. Montalvo (by phone) regarding management options for pt. After discussion, it was acknowledged by all parties that, due to pt's COVID+ status, transfer to [facility] will not be an option until 4/29, as all COVID+ psych units have been closed. Pt will have to be managed in-house until she can return to her group home. Per SW and RN staff who spoke with the group home today, efforts are underway to return pt to the group home tomorrow (pending COVID testing of group home staff and availability of sufficient staff to supervise pt). In the meantime, pt will be maintained on Thorazine 50 mg IM q6h standing to help prevent further behavioral outbursts.
En route to see pt in her room today, MD encountered pt in 5N elevator lobby accompanied by PCA, leaning against the closed elevator door. Per PCA, pt had demanded an IV, but became impatient while waiting for RN to arrive with ultrasound machine to assist in IV placement and pushed past PCA into the hallway. MD, PCA and 5N RN who was passing by all worked together to encourage pt to return to her room. On her return, pt began to cry in frustration, repeatedly crying out that she needed morphine IV for her back pain. MD suggested substituting morphine PO, which pt agreed to, but she persisted in demands for an IV "in case I get a seizure." MD told pt her risk of seizure is very low, at which pt reacted angrily: "You're just like my [adoptive] mother! I don't like you." Pt tried to push MD out of the way, screamed directly into MD's face and demanded MD leave the area. MD stayed nearby but out of sight until pt got her medication, then left. Per staff, pt is due to be transported back to her residence at 1 pm today.

## 2022-04-22 NOTE — BH CONSULTATION LIAISON PROGRESS NOTE - NSBHASSESSMENTFT_PSY_ALL_CORE
36F, single and domiciled in [facility] scattered-site group home, with MHx of obesity, HTN, asthma, endometriosis, Factor V Leiden, hypothyroidism, pituitary mass, seizure disorder (suspected PNES) and insomnia, and PHx of intellectual disability and SAD vs bipolar DO with h/o multiple IP psych hospitalizations (most recently [place] in 10/2020) and countless ED presentations at multiple hospitals for SAs, SIBs, attention-seeking behavior and medical complaints, BIBEMS from group home on 4/19 c/o CP back pain and mild SOB x1d, found to be COVID+ (asymptomatic). Psych consult was called for SI after pt was found on the morning of 4/20 with telemetry wires wrapped around her neck, stating when questioned that she was looking for attention. On initial exam, pt presents calm, cooperative and in NAD, denying SI/HI/AVH and again attributing her act this morning to seeking attention. While clearly intellectually limited and concrete in her thought process, pt has sufficient insight into her own behavior to acknowledge that she frequently engages in similar attention-seeking behavior. Given her hx of attention-seeking behavior and limited coping skills, pt appears to be in need of continued CO 1:1 observation (for general safety, NOT suicidality) during her hospital stay. She also appears likely to benefit from creating a behavior plan together with hospital staff, which would establish a system of incentives for calm, organized behavior and appropriate help seeking (eg snacks or privileges). Pt has also been restarted on all her home medications. On f/u today, pt has again engaged in combative and attention-seeking behavior (pushing past PCA to elevator lobby, attempting to push MD out of the entryway of her room) which again appear to have been triggered by impatience and poor distress tolerance. As was discussed at length in interdisciplinary meeting on 4/21, due to pt's COVID+ status, the only realistic dispo option is to facilitate safe DC back to pt's residence as soon as feasible, planned for later today (repeat COVID test obtained this morning resulted negative). Pt is psych cleared for D/C back to her residence. Pt does not appear to present an acute risk of harm to self or others at the time of assessment, and does not appear to be in need of admission to IP psych at the time of assessment.
36F, single and domiciled in [facility] group home, with MHx of obesity, HTN, asthma, endometriosis, Factor V Leiden, hypothyroidism, pituitary mass, seizure disorder (suspected PNES) and insomnia, and PHx of intellectual disability and SAD vs bipolar DO with h/o multiple IP psych hospitalizations (most recently [place] in 10/2020) and countless ED presentations at multiple hospitals for SAs, SIBs, attention-seeking behavior and medical complaints, BIBEMS from group home on 4/19 c/o CP back pain and mild SOB x1d, found to be COVID+ (asymptomatic). Psych consult was called for SI after pt was found on the morning of 4/20 with telemetry wires wrapped around her neck, stating when questioned that she was looking for attention. On initial exam, pt presents calm, cooperative and in NAD, denying SI/HI/AVH and again attributing her act this morning to seeking attention. While clearly intellectually limited and concrete in her thought process, pt has sufficient insight into her own behavior to acknowledge that she frequently engages in similar attention-seeking behavior. Given her hx of attention-seeking behavior and limited coping skills, pt appears to be in need of continued CO 1:1 observation (for general safety, NOT suicidality) during her hospital stay. She also appears likely to benefit from creating a behavior plan together with hospital staff, which would establish a system of incentives for calm, organized behavior and appropriate help seeking (eg snacks or privileges). Pt has also been restarted on all her home medications. On f/u today, pt has engaged in multiple episodes of attention-seeking behavior (screaming, head-banging, climbing onto window ledge, shoving thermometer down her throat) which appear to have been triggered by pt's difficulty coping with a variety of stressors, ranging from having her earphones withheld to wanting to see her bio mother. As was discussed at length in interdisciplinary meeting today, due to pt's COVID+ status, the only realistic dispo option is to facilitate safe DC back to pt's residence as soon as feasible (hopefully by tomorrow, 4/22/22). Pt does not appear to present an acute risk of harm to self or others at the time of assessment, and does not appear to be in need of admission to IP psych at the time of assessment.

## 2022-04-22 NOTE — PROGRESS NOTE ADULT - SUBJECTIVE AND OBJECTIVE BOX
HPI:  37 yo F, from group home, PMHx of hypothyroidism schizoaffective disorder, bipolar disorder, mild intellectual disabilities asthma, endometriosis, DVT/Pe (factor V Leiden def), post pituitary gland mass, seizure( hx of pseudoseizure per records from [facility]) came with chief complain of chest pain, back pain and mild sob since yesterday. Pt stated that yesterday all of sudden she started having pain in her chest, non radiating, 10/10 in intensity constant , associated with nausea and mils shortness of breadth. Pt said that this is not the first  time she is having this pain . She further stated that it hurts when she takes deep breadth. Pt denied vomiting , diarrhea, abdominal pain , cardiac stents in the past. Per patient she developed DVT/PE on Xarelto and was changed to Eliquis.        ED course: CTA chest was performed but every time contrast injected her IV line got infiltrative (happened thrice) . CT dpt recommended to get VQ scan . D Dimer 224. trop neg. EKG NSR twice. In ED pt kept asking for pain medications and denied taking Tylenol and NSAIDs.  (19 Apr 2022 12:52)      Patient is a 36y old  Female who presents with a chief complaint of chest pain (20 Apr 2022 11:05)      INTERVAL HPI/OVERNIGHT EVENTS:  T(C): 36.9 (04-20-22 @ 16:00), Max: 37.2 (04-20-22 @ 14:08)  HR: 87 (04-20-22 @ 16:00) (81 - 123)  BP: 119/79 (04-20-22 @ 16:00) (105/73 - 123/80)  RR: 17 (04-20-22 @ 16:00) (17 - 18)  SpO2: 100% (04-20-22 @ 16:00) (97% - 100%)  Wt(kg): --  I&O's Summary    20 Apr 2022 07:01  -  20 Apr 2022 17:40  --------------------------------------------------------  IN: 0 mL / OUT: 600 mL / NET: -600 mL        REVIEW OF SYSTEMS: denies fever, chills, SOB, palpitations, chest pain, abdominal pain, nausea, vomitting, diarrhea, constipation, dizziness    MEDICATIONS  (STANDING):  acetaminophen     Tablet .. 1000 milliGRAM(s) Oral every 8 hours  ALBUTerol    90 MICROgram(s) HFA Inhaler 1 Puff(s) Inhalation every 6 hours  apixaban 5 milliGRAM(s) Oral every 12 hours  ARIPiprazole 30 milliGRAM(s) Oral daily  diVALproex DR 1500 milliGRAM(s) Oral at bedtime  diVALproex  milliGRAM(s) Oral <User Schedule>  haloperidol     Tablet 5 milliGRAM(s) Oral every 12 hours  levothyroxine 75 MICROGram(s) Oral daily  melatonin 3 milliGRAM(s) Oral at bedtime  pantoprazole    Tablet 40 milliGRAM(s) Oral before breakfast  QUEtiapine 50 milliGRAM(s) Oral every 12 hours    MEDICATIONS  (PRN):      PHYSICAL EXAM:  GENERAL: NAD, well-groomed, well-developed  HEAD:  Atraumatic, Normocephalic  EYES: EOMI, PERRLA, conjunctiva and sclera clear  ENMT: No tonsillar erythema, exudates, or enlargement; Moist mucous membranes, Good dentition, No lesions  NECK: Supple, No JVD, Normal thyroid  NERVOUS SYSTEM:  Alert & Oriented X3, Good concentration; Motor Strength 5/5 B/L upper and lower extremities; DTRs 2+ intact and symmetric  CHEST/LUNG: Clear to percussion bilaterally; No rales, rhonchi, wheezing, or rubs  HEART: Regular rate and rhythm; No murmurs, rubs, or gallops  ABDOMEN: Soft, Nontender, Nondistended; Bowel sounds present  EXTREMITIES:  2+ Peripheral Pulses, No clubbing, cyanosis, or edema  LYMPH: No lymphadenopathy noted  SKIN: No rashes or lesions  LABS:                        11.7   7.54  )-----------( 142      ( 19 Apr 2022 05:10 )             36.3     04-19    139  |  108  |  14  ----------------------------<  81  5.0   |  25  |  0.78    Ca    9.4      19 Apr 2022 05:10    TPro  8.2  /  Alb  3.6  /  TBili  0.3  /  DBili  x   /  AST  24  /  ALT  26  /  AlkPhos  53  04-19        CAPILLARY BLOOD GLUCOSE            
PGY-1 Progress Note discussed with attending    PAGER #: [[phone]] TILL 5:00 PM  PLEASE CONTACT ON CALL TEAM:  - On Call Team (Please refer to Patti) FROM 5:00 PM - 8:30PM  - Nightfloat Team FROM 8:30 -7:30 AM    INTERVAL HPI/OVERNIGHT EVENTS:   - Patient is non-compliant with ROS. Patient banging head on wall later in the day. Was compliant when explained to stop.     REVIEW OF SYSTEMS:  as stated above    MEDICATIONS  (STANDING):  acetaminophen     Tablet .. 1000 milliGRAM(s) Oral every 8 hours  ALBUTerol    90 MICROgram(s) HFA Inhaler 1 Puff(s) Inhalation every 6 hours  apixaban 5 milliGRAM(s) Oral every 12 hours  ARIPiprazole 30 milliGRAM(s) Oral daily  diVALproex DR 1500 milliGRAM(s) Oral at bedtime  diVALproex  milliGRAM(s) Oral <User Schedule>  haloperidol     Tablet 5 milliGRAM(s) Oral every 12 hours  haloperidol    Injectable 2 milliGRAM(s) IV Push once  levothyroxine 75 MICROGram(s) Oral daily  LORazepam   Injectable 1 milliGRAM(s) IV Push once  melatonin 3 milliGRAM(s) Oral at bedtime  pantoprazole    Tablet 40 milliGRAM(s) Oral before breakfast  QUEtiapine 50 milliGRAM(s) Oral every 12 hours    MEDICATIONS  (PRN):  chlorproMAZINE    Injectable 50 milliGRAM(s) IntraMuscular every 6 hours PRN agitation  diphenhydrAMINE 25 milliGRAM(s) Oral every 6 hours PRN Rash and/or Itching  LORazepam     Tablet 0.5 milliGRAM(s) Oral every 8 hours PRN Anxiety/panic      Vital Signs Last 24 Hrs  T(C): 36.8 (21 Apr 2022 08:32), Max: 37.2 (20 Apr 2022 14:08)  T(F): 98.3 (21 Apr 2022 08:32), Max: 98.9 (20 Apr 2022 14:08)  HR: 96 (21 Apr 2022 08:32) (85 - 123)  BP: 102/66 (21 Apr 2022 08:32) (101/56 - 123/80)  BP(mean): --  RR: 16 (21 Apr 2022 08:32) (16 - 18)  SpO2: 98% (21 Apr 2022 08:32) (98% - 100%)    PHYSICAL EXAMINATION:  GENERAL: NAD, AAOx3  HEAD: AT/NC  EYES: conjunctiva and sclera clear  NECK: supple, No JVD noted, Normal thyroid  CHEST/LUNG: CTABL; no rales, rhonchi, wheezing, or rubs  HEART: regular rate and rhythm; no murmurs, rubs, or gallops  ABDOMEN: soft, nontender, nondistended; Bowel sounds present  EXTREMITIES:  2+ Peripheral Pulses, No clubbing, cyanosis, or edema  SKIN: warm dry                          10.3   4.84  )-----------( 135      ( 21 Apr 2022 08:11 )             31.9     04-21    142  |  110<H>  |  13  ----------------------------<  95  3.9   |  25  |  0.76    Ca    9.2      21 Apr 2022 08:11              COVID-19 PCR: Detected (19 Apr 2022 12:50)      CAPILLARY BLOOD GLUCOSE          RADIOLOGY & ADDITIONAL TESTS:                  
  CHIEF COMPLAINT:Patient is a 36y old  Female who presents with a chief complaint of chest pain .Pt reporting back pain.    	  REVIEW OF SYSTEMS:  CONSTITUTIONAL: No fever, weight loss, or fatigue  EYES: No eye pain, visual disturbances, or discharge  ENT:  No difficulty hearing, tinnitus, vertigo; No sinus or throat pain  NECK: No pain or stiffness  RESPIRATORY: No cough, wheezing, chills or hemoptysis; No Shortness of Breath  CARDIOVASCULAR: No chest pain, palpitations, passing out, dizziness, or leg swelling  GASTROINTESTINAL: No abdominal or epigastric pain. No nausea, vomiting, or hematemesis; No diarrhea or constipation. No melena or hematochezia.  GENITOURINARY: No dysuria, frequency, hematuria, or incontinence  NEUROLOGICAL: No headaches, memory loss, loss of strength, numbness, or tremors  SKIN: No itching, burning, rashes, or lesions   LYMPH Nodes: No enlarged glands  ENDOCRINE: No heat or cold intolerance; No hair loss  MUSCULOSKELETAL: No joint pain or swelling; No muscle, back, or extremity pain  PSYCHIATRIC: No depression, anxiety, mood swings, or difficulty sleeping  HEME/LYMPH: No easy bruising, or bleeding gums  ALLERGY AND IMMUNOLOGIC: No hives or eczema	      PHYSICAL EXAM:  T(C): 36.7 (04-22-22 @ 08:21), Max: 37 (04-22-22 @ 04:20)  HR: 136 (04-22-22 @ 08:21) (90 - 136)  BP: 125/85 (04-22-22 @ 08:21) (109/75 - 125/85)  RR: 18 (04-22-22 @ 08:21) (17 - 18)  SpO2: 97% (04-22-22 @ 08:21) (97% - 98%)  Wt(kg): --  I&O's Summary      Appearance: Normal	  HEENT:   Normal oral mucosa, PERRL, EOMI	  Lymphatic: No lymphadenopathy  Cardiovascular: Normal S1 S2, No JVD, No murmurs, No edema  Respiratory: Lungs clear to auscultation	  Psychiatry: A & O x 3, Mood & affect appropriate  Gastrointestinal:  Soft, Non-tender, + BS	  Skin: No rashes, No ecchymoses, No cyanosis	  Neurologic: Non-focal  Extremities: Normal range of motion, No clubbing, cyanosis or edema  Vascular: Peripheral pulses palpable 2+ bilaterally    MEDICATIONS  (STANDING):  acetaminophen     Tablet .. 1000 milliGRAM(s) Oral every 8 hours  ALBUTerol    90 MICROgram(s) HFA Inhaler 1 Puff(s) Inhalation every 6 hours  ARIPiprazole 30 milliGRAM(s) Oral daily  chlorproMAZINE    Injectable 50 milliGRAM(s) IntraMuscular every 6 hours  diVALproex DR 1500 milliGRAM(s) Oral at bedtime  diVALproex  milliGRAM(s) Oral <User Schedule>  haloperidol     Tablet 5 milliGRAM(s) Oral every 12 hours  levothyroxine 75 MICROGram(s) Oral daily  melatonin 3 milliGRAM(s) Oral at bedtime  pantoprazole    Tablet 40 milliGRAM(s) Oral before breakfast  QUEtiapine 50 milliGRAM(s) Oral every 12 hours    	  	  LABS:	 	                       10.3   4.84  )-----------( 135      ( 21 Apr 2022 08:11 )             31.9     04-21    142  |  110<H>  |  13  ----------------------------<  95  3.9   |  25  |  0.76    Ca    9.2      21 Apr 2022 08:11        	        
HPI:  35 yo F, from group home, PMHx of hypothyroidism schizoaffective disorder, bipolar disorder, mild intellectual disabilities asthma, endometriosis, DVT/Pe (factor V Leiden def), post pituitary gland mass, seizure( hx of pseudoseizure per records from [facility]) came with chief complain of chest pain, back pain and mild sob since yesterday. Pt stated that yesterday all of sudden she started having pain in her chest, non radiating, 10/10 in intensity constant , associated with nausea and mils shortness of breadth. Pt said that this is not the first  time she is having this pain . She further stated that it hurts when she takes deep breadth. Pt denied vomiting , diarrhea, abdominal pain , cardiac stents in the past. Per patient she developed DVT/PE on Xarelto and was changed to Eliquis.        ED course: CTA chest was performed but every time contrast injected her IV line got infiltrative (happened thrice) . CT dpt recommended to get VQ scan . D Dimer 224. trop neg. EKG NSR twice. In ED pt kept asking for pain medications and denied taking Tylenol and NSAIDs.  (19 Apr 2022 12:52)      Patient is a 36y old  Female who presents with a chief complaint of chest pain (21 Apr 2022 12:39)      INTERVAL HPI/OVERNIGHT EVENTS:  T(C): 36.8 (04-21-22 @ 08:32), Max: 36.9 (04-20-22 @ 16:00)  HR: 96 (04-21-22 @ 08:32) (85 - 117)  BP: 102/66 (04-21-22 @ 08:32) (101/56 - 119/79)  RR: 16 (04-21-22 @ 08:32) (16 - 18)  SpO2: 98% (04-21-22 @ 08:32) (98% - 100%)  Wt(kg): --  I&O's Summary    20 Apr 2022 07:01  -  21 Apr 2022 07:00  --------------------------------------------------------  IN: 0 mL / OUT: 600 mL / NET: -600 mL        REVIEW OF SYSTEMS: denies fever, chills, SOB, palpitations, chest pain, abdominal pain, nausea, vomitting, diarrhea, constipation, dizziness    MEDICATIONS  (STANDING):  acetaminophen     Tablet .. 1000 milliGRAM(s) Oral every 8 hours  ALBUTerol    90 MICROgram(s) HFA Inhaler 1 Puff(s) Inhalation every 6 hours  apixaban 5 milliGRAM(s) Oral every 12 hours  ARIPiprazole 30 milliGRAM(s) Oral daily  diVALproex DR 1500 milliGRAM(s) Oral at bedtime  diVALproex  milliGRAM(s) Oral <User Schedule>  haloperidol     Tablet 5 milliGRAM(s) Oral every 12 hours  levothyroxine 75 MICROGram(s) Oral daily  melatonin 3 milliGRAM(s) Oral at bedtime  pantoprazole    Tablet 40 milliGRAM(s) Oral before breakfast  QUEtiapine 50 milliGRAM(s) Oral every 12 hours    MEDICATIONS  (PRN):  chlorproMAZINE    Tablet 50 milliGRAM(s) Oral every 8 hours PRN agitation  diphenhydrAMINE Injectable 25 milliGRAM(s) IV Push every 6 hours PRN Rash and/or Itching  LORazepam     Tablet 0.5 milliGRAM(s) Oral every 8 hours PRN Anxiety/panic      PHYSICAL EXAM:  GENERAL: NAD, well-groomed, well-developed  HEAD:  Atraumatic, Normocephalic  EYES: EOMI, PERRLA, conjunctiva and sclera clear  ENMT: No tonsillar erythema, exudates, or enlargement; Moist mucous membranes, Good dentition, No lesions  NECK: Supple, No JVD, Normal thyroid  NERVOUS SYSTEM:  Alert & Oriented X3, Good concentration; Motor Strength 5/5 B/L upper and lower extremities; DTRs 2+ intact and symmetric  CHEST/LUNG: Clear to percussion bilaterally; No rales, rhonchi, wheezing, or rubs  HEART: Regular rate and rhythm; No murmurs, rubs, or gallops  ABDOMEN: Soft, Nontender, Nondistended; Bowel sounds present  EXTREMITIES:  2+ Peripheral Pulses, No clubbing, cyanosis, or edema  LYMPH: No lymphadenopathy noted  SKIN: No rashes or lesions  LABS:                        10.3   4.84  )-----------( 135      ( 21 Apr 2022 08:11 )             31.9     04-21    142  |  110<H>  |  13  ----------------------------<  95  3.9   |  25  |  0.76    Ca    9.2      21 Apr 2022 08:11          CAPILLARY BLOOD GLUCOSE            
HPI:  35 yo F, from group home, PMHx of hypothyroidism schizoaffective disorder, bipolar disorder, mild intellectual disabilities asthma, endometriosis, DVT/Pe (factor V Leiden def), post pituitary gland mass, seizure( hx of pseudoseizure per records from [facility]) came with chief complain of chest pain, back pain and mild sob since yesterday. Pt stated that yesterday all of sudden she started having pain in her chest, non radiating, 10/10 in intensity constant , associated with nausea and mils shortness of breadth. Pt said that this is not the first  time she is having this pain . She further stated that it hurts when she takes deep breadth. Pt denied vomiting , diarrhea, abdominal pain , cardiac stents in the past. Per patient she developed DVT/PE on Xarelto and was changed to Eliquis.        ED course: CTA chest was performed but every time contrast injected her IV line got infiltrative (happened thrice) . CT dpt recommended to get VQ scan . D Dimer 224. trop neg. EKG NSR twice. In ED pt kept asking for pain medications and denied taking Tylenol and NSAIDs.  (19 Apr 2022 12:52)      Patient is a 36y old  Female who presents with a chief complaint of chest pain (22 Apr 2022 10:53)      INTERVAL HPI/OVERNIGHT EVENTS:  T(C): 36.7 (04-22-22 @ 13:30), Max: 37 (04-22-22 @ 04:20)  HR: 113 (04-22-22 @ 13:30) (90 - 136)  BP: 121/84 (04-22-22 @ 13:30) (109/75 - 125/85)  RR: 18 (04-22-22 @ 13:30) (17 - 18)  SpO2: 100% (04-22-22 @ 13:30) (97% - 100%)  Wt(kg): --  I&O's Summary      REVIEW OF SYSTEMS: denies fever, chills, SOB, palpitations, chest pain, abdominal pain, nausea, vomitting, diarrhea, constipation, dizziness    MEDICATIONS  (STANDING):  ALBUTerol    90 MICROgram(s) HFA Inhaler 1 Puff(s) Inhalation every 6 hours  apixaban 5 milliGRAM(s) Oral every 12 hours  ARIPiprazole 30 milliGRAM(s) Oral daily  chlorproMAZINE    Injectable 50 milliGRAM(s) IntraMuscular every 6 hours  diVALproex DR 1500 milliGRAM(s) Oral at bedtime  diVALproex  milliGRAM(s) Oral <User Schedule>  haloperidol     Tablet 5 milliGRAM(s) Oral every 12 hours  levothyroxine 75 MICROGram(s) Oral daily  lidocaine   4% Patch 1 Patch Transdermal every 24 hours  melatonin 3 milliGRAM(s) Oral at bedtime  pantoprazole    Tablet 40 milliGRAM(s) Oral before breakfast  QUEtiapine 50 milliGRAM(s) Oral every 12 hours    MEDICATIONS  (PRN):  diphenhydrAMINE Injectable 50 milliGRAM(s) IntraMuscular every 6 hours PRN Rash and/or Itching  LORazepam     Tablet 1 milliGRAM(s) Oral every 6 hours PRN Agitation      PHYSICAL EXAM:  GENERAL: NAD, well-groomed, well-developed  HEAD:  Atraumatic, Normocephalic  EYES: EOMI, PERRLA, conjunctiva and sclera clear  ENMT: No tonsillar erythema, exudates, or enlargement; Moist mucous membranes, Good dentition, No lesions  NECK: Supple, No JVD, Normal thyroid  NERVOUS SYSTEM:  Alert & Oriented X3, Good concentration; Motor Strength 5/5 B/L upper and lower extremities; DTRs 2+ intact and symmetric  CHEST/LUNG: Clear to percussion bilaterally; No rales, rhonchi, wheezing, or rubs  HEART: Regular rate and rhythm; No murmurs, rubs, or gallops  ABDOMEN: Soft, Nontender, Nondistended; Bowel sounds present  EXTREMITIES:  2+ Peripheral Pulses, No clubbing, cyanosis, or edema  LYMPH: No lymphadenopathy noted  SKIN: No rashes or lesions  LABS:                        10.3   4.84  )-----------( 135      ( 21 Apr 2022 08:11 )             31.9     04-21    142  |  110<H>  |  13  ----------------------------<  95  3.9   |  25  |  0.76    Ca    9.2      21 Apr 2022 08:11          CAPILLARY BLOOD GLUCOSE            
NP Note discussed with  Primary Attending    Patient is a 36y old  Female who presents with a chief complaint of chest pain (19 Apr 2022 12:52)      INTERVAL HPI/OVERNIGHT EVENTS: Patient seen and examined at bedside.     MEDICATIONS  (STANDING):  acetaminophen     Tablet .. 1000 milliGRAM(s) Oral every 8 hours  ALBUTerol    90 MICROgram(s) HFA Inhaler 1 Puff(s) Inhalation every 6 hours  apixaban 5 milliGRAM(s) Oral every 12 hours  ARIPiprazole 30 milliGRAM(s) Oral daily  diVALproex DR 1500 milliGRAM(s) Oral at bedtime  diVALproex  milliGRAM(s) Oral <User Schedule>  haloperidol     Tablet 5 milliGRAM(s) Oral every 12 hours  levothyroxine 75 MICROGram(s) Oral daily  melatonin 3 milliGRAM(s) Oral at bedtime  pantoprazole    Tablet 40 milliGRAM(s) Oral before breakfast  QUEtiapine 50 milliGRAM(s) Oral every 12 hours    MEDICATIONS  (PRN):      __________________________________________________  REVIEW OF SYSTEMS:    CONSTITUTIONAL: No fever,   EYES: no acute visual disturbances  NECK: No pain or stiffness  RESPIRATORY: No cough; No shortness of breath  CARDIOVASCULAR: right upper chest pain, no palpitations  GASTROINTESTINAL: No pain. No nausea or vomiting; No diarrhea   NEUROLOGICAL: No headache or numbness, no tremors  MUSCULOSKELETAL: No joint pain, no muscle pain  GENITOURINARY: no dysuria, no frequency, no hesitancy  PSYCHIATRY: depression , no anxiety  ALL OTHER  ROS negative        Vital Signs Last 24 Hrs  T(C): 36.7 (20 Apr 2022 07:54), Max: 36.7 (19 Apr 2022 21:21)  T(F): 98 (20 Apr 2022 07:54), Max: 98.1 (19 Apr 2022 21:21)  HR: 111 (20 Apr 2022 07:54) (81 - 111)  BP: 115/80 (20 Apr 2022 07:54) (105/73 - 120/84)  BP(mean): --  RR: 17 (20 Apr 2022 07:54) (17 - 18)  SpO2: 98% (20 Apr 2022 07:54) (98% - 100%)    ________________________________________________  PHYSICAL EXAM:  GENERAL: flat affect, expressive aphasia, depressed, hopeless.  HEENT: Normocephalic;  conjunctivae and sclerae clear; moist mucous membranes;   NECK : supple  CHEST/LUNG: Clear to auscultation bilaterally with good air entry, produceable right chest pain on palpation  HEART: S1 S2  regular; no murmurs, gallops or rubs  ABDOMEN: Soft, Nontender, Nondistended; Bowel sounds present  EXTREMITIES: no cyanosis; no edema; no calf tenderness  SKIN: warm and dry; no rash  NERVOUS SYSTEM:  Awake and alert; Oriented  to place, person and time ; no new deficits    _________________________________________________  LABS:                        11.7   7.54  )-----------( 142      ( 19 Apr 2022 05:10 )             36.3     04-19    139  |  108  |  14  ----------------------------<  81  5.0   |  25  |  0.78    Ca    9.4      19 Apr 2022 05:10    TPro  8.2  /  Alb  3.6  /  TBili  0.3  /  DBili  x   /  AST  24  /  ALT  26  /  AlkPhos  53  04-19        CAPILLARY BLOOD GLUCOSE      RADIOLOGY & ADDITIONAL TESTS:  < from: NM Pulmonary Perfusion Scan (04.20.22 @ 10:26) >  FINDINGS: The study demonstrates homogenous tracer distribution in both   lungs. There are no segmental perfusion defects.    IMPRESSION: Normal perfusion scan. No scan evidence of pulmonary embolus.    --- End of Report ---    < end of copied text >  < from: CT Angio Chest PE Protocol w/ IV Cont (04.19.22 @ 09:01) >    FINDINGS:    Pulmonary Artery:  The exam is nondiagnostic for pulmonary embolus.    Tubes/Lines: None.    Lungs, airways and pleura: The lungs are clear. The airways and pleura   are normal.    Mediastinum: The visualized thyroid gland is normal. There are no   enlarged chest lymph nodes. The esophagus is normal.    The heart is normal in size. No pericardial effusion. The aorta is normal.    Upper Abdomen: The upper abdomen is unremarkable.    Bones And Soft Tissues: The bones are unremarkable.  The soft tissues are   unremarkable.      IMPRESSION:    1.  The exam is nondiagnostic for pulmonary embolus secondary to timing   of the bolus.  2.  The lungs are clear.    --- End of Report ---    < end of copied text >  < from: Xray Chest 2 Views PA/Lat (04.19.22 @ 04:48) >  Impression: No evidence for acute pulmonary infiltrate, pleural effusion,   or pneumothorax.    The cardiac silhouette is within normal limits.    The trachea is midline.    There is no pulmonary vascular congestion .    --- End of Report ---    < end of copied text >    Imaging  Reviewed:  YES    Consultant(s) Notes Reviewed:   YES      Plan of care was discussed with patient and /or primary care giver; all questions and concerns were addressed 
PGY-1 Progress Note discussed with attending    PAGER #: [[phone]] TILL 5:00 PM  PLEASE CONTACT ON CALL TEAM:  - On Call Team (Please refer to Patti) FROM 5:00 PM - 8:30PM  - Nightfloat Team FROM 8:30 -7:30 AM    INTERVAL HPI/OVERNIGHT EVENTS:   Patient frustrated, asking for IV medicaiton, no PO. Hitting head against the wall. Talked about why she wants IV and not po, says that it works better, she allowed me to examine her. there is area of dry wound on left lower leg from previous "cellulitis". she says that that is where the itchiness is. Recommended to try lotions/creams which she refuses saying "i don't want lotions, just cut my foot off, i dont' care". Expressed we're trying to help the patient, and dont want to hurt her, or for her to hurt herself. patient Kept refusing creams, and requesting IV medicaitons. She calmed down during questioning and was sitting in the bed, but after talking was completed proceded to stand up and bang her head against the wall. Later she grabbed a PCA arms for which a code gray was called. She was ordered Thorazine 50mg prn as per Psych. Dr. Martinez was called about the situation, pending recs.     REVIEW OF SYSTEMS:  CONSTITUTIONAL: No fever, weight loss, or fatigue  RESPIRATORY: No cough, wheezing, chills or hemoptysis; No shortness of breath  CARDIOVASCULAR: No chest pain, palpitations, dizziness, or leg swelling  GASTROINTESTINAL: No abdominal pain. No nausea, vomiting, or hematemesis; No diarrhea or constipation. No melena or hematochezia.  GENITOURINARY: No dysuria or hematuria, urinary frequency  MUSCULOSKELETAL: No pain, no Limited ROM  NEUROLOGICAL: No headaches, memory loss, loss of strength, numbness, or tremors  SKIN: + itchines    MEDICATIONS  (STANDING):  acetaminophen     Tablet .. 1000 milliGRAM(s) Oral every 8 hours  ALBUTerol    90 MICROgram(s) HFA Inhaler 1 Puff(s) Inhalation every 6 hours  apixaban 5 milliGRAM(s) Oral every 12 hours  ARIPiprazole 30 milliGRAM(s) Oral daily  diVALproex DR 1500 milliGRAM(s) Oral at bedtime  diVALproex  milliGRAM(s) Oral <User Schedule>  haloperidol     Tablet 5 milliGRAM(s) Oral every 12 hours  levothyroxine 75 MICROGram(s) Oral daily  melatonin 3 milliGRAM(s) Oral at bedtime  pantoprazole    Tablet 40 milliGRAM(s) Oral before breakfast  QUEtiapine 50 milliGRAM(s) Oral every 12 hours    MEDICATIONS  (PRN):  chlorproMAZINE    Injectable 50 milliGRAM(s) IntraMuscular every 6 hours PRN agitation  diphenhydrAMINE 25 milliGRAM(s) Oral every 6 hours PRN Rash and/or Itching  LORazepam     Tablet 0.5 milliGRAM(s) Oral every 8 hours PRN Anxiety/panic      Vital Signs Last 24 Hrs  T(C): 36.8 (21 Apr 2022 08:32), Max: 37.2 (20 Apr 2022 14:08)  T(F): 98.3 (21 Apr 2022 08:32), Max: 98.9 (20 Apr 2022 14:08)  HR: 96 (21 Apr 2022 08:32) (85 - 123)  BP: 102/66 (21 Apr 2022 08:32) (101/56 - 123/80)  BP(mean): --  RR: 16 (21 Apr 2022 08:32) (16 - 18)  SpO2: 98% (21 Apr 2022 08:32) (98% - 100%)    PHYSICAL EXAMINATION:  GENERAL: NAD, well built  HEAD:  Atraumatic, Normocephalic  EYES:  conjunctiva and sclera clear, no scleral icterus  NECK: Supple, No JVD, Normal thyroid  CHEST/LUNG: Clear to auscultation.  No rales, rhonchi, wheezing, or rubs  HEART: Regular rate and rhythm; No murmurs, rubs, or gallops  ABDOMEN: Soft, Nontender, Nondistended; Bowel sounds present  NERVOUS SYSTEM:  Alert & Oriented X3,  Strength 5/5 in upper and lower extremities, sensation intact  EXTREMITIES:  2+ Peripheral Pulses, No clubbing, cyanosis, or edema  SKIN: dry wound with black scab in the middle in anterior left lower leg                           10.3   4.84  )-----------( 135      ( 21 Apr 2022 08:11 )             31.9     04-21    142  |  110<H>  |  13  ----------------------------<  95  3.9   |  25  |  0.76    Ca    9.2      21 Apr 2022 08:11              I&O's Summary    20 Apr 2022 07:01  -  21 Apr 2022 07:00  --------------------------------------------------------  IN: 0 mL / OUT: 600 mL / NET: -600 mL          RADIOLOGY & ADDITIONAL TESTS:

## 2022-04-22 NOTE — DISCHARGE NOTE PROVIDER - NSDCMRMEDTOKEN_GEN_ALL_CORE_FT
albuterol 90 mcg/inh inhalation aerosol: 1 puff(s) inhaled every 6 hours  ARIPiprazole 30 mg oral tablet: 1 tab(s) orally once a day  Benadryl 25 mg oral capsule: 1 cap(s) orally every 6 hours, As Needed  chlorproMAZINE 50 mg oral tablet: 1 tab(s) orally 3 times a day  Depakote 500 mg oral delayed release tablet: 3 tab(s) orally once a day (at bedtime)  Depakote  mg oral tablet, extended release: 1 tab(s) orally once a day in morning   Eliquis 5 mg oral tablet: 1 tab(s) orally 2 times a day  EPINEPHrine:   Eucerin topical lotion: Apply topically to affected area 2 times a day  Flovent 110 mcg/inh inhalation aerosol with adapter: 1 puff(s) inhaled 2 times a day  fluticasone 50 mcg/inh inhalation powder: 1 puff(s) inhaled 2 times a day  haloperidol 5 mg oral tablet: 1 tab(s) orally 2 times a day  Hydrocort 1% topical cream: Apply topically to affected area once a day  levothyroxine 75 mcg (0.075 mg) oral tablet: 1 tab(s) orally once a day  loratadine 10 mg oral tablet: 1 tab(s) orally once a day  LORazepam 1 mg oral tablet: 1 tab(s) orally every 6 hours, As needed, Agitation  Melatonin 3 mg oral tablet: 1 tab(s) orally once a day (at bedtime)  Multiple Vitamins with Iron oral tablet: 1 tab(s) orally once a day  olopatadine 0.1% ophthalmic solution: 1 drop(s) to each affected eye once a day, As Needed for allergy   pantoprazole 40 mg oral delayed release tablet: 1 tab(s) orally once a day  QUEtiapine 50 mg oral tablet: 1 tab(s) orally 2 times a day  ramelteon 8 mg oral tablet: 1 tab(s) orally once a day (at bedtime), As Needed insomnia  tiZANidine 2 mg oral tablet: 2 tab(s) orally 2 times a day, As Needed insomnia   Vicks 44 Cold and Cough LiquiCaps oral capsule: orally every 6 hours, As Needed  Vitamin D2 1.25 mg (50,000 intl units) oral capsule: 1 cap(s) orally once a week sunday  ZyrTEC 10 mg oral tablet: 1 tab(s) orally once a day   ARIPiprazole 30 mg oral tablet: 1 tab(s) orally once a day  Benadryl 25 mg oral capsule: 1 cap(s) orally every 6 hours, As Needed  chlorproMAZINE 50 mg oral tablet: 1 tab(s) orally 3 times a day  Depakote 500 mg oral delayed release tablet: 3 tab(s) orally once a day (at bedtime)  Depakote  mg oral tablet, extended release: 1 tab(s) orally once a day in morning   Eliquis 5 mg oral tablet: 1 tab(s) orally 2 times a day  EPINEPHrine:   Eucerin topical lotion: Apply topically to affected area 2 times a day  Flovent 110 mcg/inh inhalation aerosol with adapter: 1 puff(s) inhaled 2 times a day  fluticasone 50 mcg/inh inhalation powder: 1 puff(s) inhaled 2 times a day  haloperidol 5 mg oral tablet: 1 tab(s) orally 2 times a day  Hydrocort 1% topical cream: Apply topically to affected area once a day  levothyroxine 75 mcg (0.075 mg) oral tablet: 1 tab(s) orally once a day  loratadine 10 mg oral tablet: 1 tab(s) orally once a day  LORazepam 1 mg oral tablet: 1 tab(s) orally every 6 hours, As needed, Agitation  Melatonin 3 mg oral tablet: 1 tab(s) orally once a day (at bedtime)  Multiple Vitamins with Iron oral tablet: 1 tab(s) orally once a day  olopatadine 0.1% ophthalmic solution: 1 drop(s) to each affected eye once a day, As Needed for allergy   pantoprazole 40 mg oral delayed release tablet: 1 tab(s) orally once a day  QUEtiapine 50 mg oral tablet: 1 tab(s) orally 2 times a day  ramelteon 8 mg oral tablet: 1 tab(s) orally once a day (at bedtime), As Needed insomnia  tiZANidine 2 mg oral tablet: 2 tab(s) orally 2 times a day, As Needed insomnia   Vicks 44 Cold and Cough LiquiCaps oral capsule: orally every 6 hours, As Needed  Vitamin D2 1.25 mg (50,000 intl units) oral capsule: 1 cap(s) orally once a week sunday  ZyrTEC 10 mg oral tablet: 1 tab(s) orally once a day   albuterol 90 mcg/inh inhalation aerosol: 1 puff(s) inhaled every 6 hours  ARIPiprazole 30 mg oral tablet: 1 tab(s) orally once a day  Ativan 0.5 mg oral tablet: 1 tab(s) orally every 8 hours MDD:3 tab  Benadryl 25 mg oral capsule: 1 cap(s) orally every 6 hours, As Needed  chlorproMAZINE 50 mg oral tablet: 1 tab(s) orally 3 times a day  Depakote  mg oral tablet, extended release: 1 tab(s) orally once a day in morning   Depakote  mg oral tablet, extended release: 2 tab(s) orally once a day (at bedtime)   Eliquis 5 mg oral tablet: 1 tab(s) orally 2 times a day  EPINEPHrine:   Eucerin topical lotion: Apply topically to affected area 2 times a day  Flovent 110 mcg/inh inhalation aerosol with adapter: 1 puff(s) inhaled 2 times a day  fluticasone 50 mcg/inh inhalation powder: 1 puff(s) inhaled 2 times a day  haloperidol 5 mg oral tablet: 1 tab(s) orally 2 times a day  Hydrocort 1% topical cream: Apply topically to affected area once a day  levothyroxine 75 mcg (0.075 mg) oral tablet: 1 tab(s) orally once a day  loratadine 10 mg oral tablet: 1 tab(s) orally once a day  LORazepam 1 mg oral tablet: 1 tab(s) orally every 6 hours, As needed, Agitation  lurasidone 40 mg oral tablet: 1 tab(s) orally once a day  Melatonin 3 mg oral tablet: 1 tab(s) orally once a day (at bedtime)  Multiple Vitamins with Iron oral tablet: 1 tab(s) orally once a day  olopatadine 0.1% ophthalmic solution: 1 drop(s) to each affected eye once a day, As Needed for allergy   pantoprazole 40 mg oral delayed release tablet: 1 tab(s) orally once a day  ramelteon 8 mg oral tablet: 1 tab(s) orally once a day (at bedtime), As Needed insomnia  SEROquel 200 mg oral tablet: 1 tab(s) orally 2 times a day MDD:500mg  SEROquel 50 mg oral tablet: 1 tab(s) orally once a day MDD:1 pill  tiZANidine 2 mg oral tablet: 2 tab(s) orally 2 times a day, As Needed insomnia   Tylenol 325 mg oral tablet: 2 tab(s) orally every 6 hours, As Needed -for mild pain   Vicks 44 Cold and Cough LiquiCaps oral capsule: orally every 6 hours, As Needed  Vitamin D2 1.25 mg (50,000 intl units) oral capsule: 1 cap(s) orally once a week sunday  ZyrTEC 10 mg oral tablet: 1 tab(s) orally once a day   albuterol 90 mcg/inh inhalation aerosol: 1 puff(s) inhaled every 6 hours, As Needed  ARIPiprazole 30 mg oral tablet: 1 tab(s) orally once a day  Ativan 0.5 mg oral tablet: 1 tab(s) orally 2 times a day 10 am and 4 pm MDD:3 tab  Benadryl 25 mg oral capsule: 1 cap(s) orally every 6 hours, As Needed  chlorproMAZINE 50 mg oral tablet: 1 tab(s) orally 3 times a day  Depakote  mg oral tablet, extended release: 1 tab(s) orally once a day in morning   Depakote  mg oral tablet, extended release: 2 tab(s) orally once a day (at bedtime)   Eliquis 5 mg oral tablet: 1 tab(s) orally 2 times a day  EPINEPHrine:   Eucerin topical lotion: Apply topically to affected area 2 times a day  Flovent 110 mcg/inh inhalation aerosol with adapter: 1 puff(s) inhaled 2 times a day  fluticasone 50 mcg/inh inhalation powder: 1 puff(s) inhaled 2 times a day  Hydrocort 1% topical cream: Apply topically to affected area once a day  levothyroxine 75 mcg (0.075 mg) oral tablet: 1 tab(s) orally once a day  loratadine 10 mg oral tablet: 1 tab(s) orally once a day  lurasidone 40 mg oral tablet: 1 tab(s) orally once a day  Melatonin 3 mg oral tablet: 2 tab(s) orally once a day (at bedtime)  Multiple Vitamins with Iron oral tablet: 1 tab(s) orally once a day  olopatadine 0.1% ophthalmic solution: 1 drop(s) to each affected eye once a day, As Needed for allergy   pantoprazole 40 mg oral delayed release tablet: 1 tab(s) orally once a day  SEROquel 200 mg oral tablet: 1 tab(s) orally 2 times a day MDD:500mg  SEROquel 50 mg oral tablet: 1 tab(s) orally once a day MDD:1 pill  Tylenol 325 mg oral tablet: 2 tab(s) orally every 6 hours, As Needed -for mild pain   Vitamin D2 1.25 mg (50,000 intl units) oral capsule: 1 cap(s) orally once a week sunday   albuterol 90 mcg/inh inhalation aerosol: 1 puff(s) inhaled every 6 hours, As Needed  ARIPiprazole 30 mg oral tablet: 1 tab(s) orally once a day  Ativan 0.5 mg oral tablet: 1 tab(s) orally 2 times a day 10 am and 4 pm MDD:3 tab  Benadryl 25 mg oral capsule: 1 cap(s) orally every 6 hours, As Needed  chlorproMAZINE 50 mg oral tablet: 1 tab(s) orally every 6 hours, As Needed -for agitation   Depakote  mg oral tablet, extended release: 1 tab(s) orally once a day in morning   Depakote  mg oral tablet, extended release: 2 tab(s) orally once a day (at bedtime)   Eliquis 5 mg oral tablet: 1 tab(s) orally 2 times a day  EPINEPHrine:   Eucerin topical lotion: Apply topically to affected area 2 times a day  Flovent 110 mcg/inh inhalation aerosol with adapter: 1 puff(s) inhaled 2 times a day  fluticasone 50 mcg/inh inhalation powder: 1 puff(s) inhaled 2 times a day  Hydrocort 1% topical cream: Apply topically to affected area once a day  levothyroxine 75 mcg (0.075 mg) oral tablet: 1 tab(s) orally once a day  loratadine 10 mg oral tablet: 1 tab(s) orally once a day  lurasidone 40 mg oral tablet: 1 tab(s) orally once a day  Melatonin 3 mg oral tablet: 2 tab(s) orally once a day (at bedtime)  Multiple Vitamins with Iron oral tablet: 1 tab(s) orally once a day  olopatadine 0.1% ophthalmic solution: 1 drop(s) to each affected eye once a day, As Needed for allergy   pantoprazole 40 mg oral delayed release tablet: 1 tab(s) orally once a day  SEROquel 200 mg oral tablet: 1 tab(s) orally 2 times a day MDD:500mg  SEROquel 50 mg oral tablet: 1 tab(s) orally once a day MDD:1 pill  Tylenol 325 mg oral tablet: 2 tab(s) orally every 6 hours, As Needed -for mild pain   Vitamin D2 1.25 mg (50,000 intl units) oral capsule: 1 cap(s) orally once a week sunday

## 2022-04-22 NOTE — BH CONSULTATION LIAISON PROGRESS NOTE - NSBHCHARTREVIEWLAB_PSY_A_CORE FT
10.3   4.84  )-----------( 135      ( 21 Apr 2022 08:11 )             31.9   04-21    142  |  110<H>  |  13  ----------------------------<  95  3.9   |  25  |  0.76    Ca    9.2      21 Apr 2022 08:11    COVID-19 PCR: NotDetec (22 Apr 2022 11:11)  COVID-19 PCR: Detected (19 Apr 2022 12:50)

## 2022-04-22 NOTE — PROGRESS NOTE ADULT - ASSESSMENT
seen and examiend  vs stable afebrile physical done ok  no complaints no cp or sob or pal  uncopertaive      labs noted    covid neg    d/c to special home  out pt pcp , psych    cont eliquis  watch for bleeding

## 2022-04-22 NOTE — BH CONSULTATION LIAISON PROGRESS NOTE - NSICDXBHSECONDARYDX_PSY_ALL_CORE
Impulse control disorder   F63.9  Schizoaffective disorder   F25.9  
Impulse control disorder   F63.9  Schizoaffective disorder   F25.9

## 2022-04-22 NOTE — BH CONSULTATION LIAISON PROGRESS NOTE - NSBHINDICATION_PSY_ALL_CORE
Safety (frequent episodes of attention-seeking/highly disruptive behavior)
Safety (frequent episodes of attention-seeking/highly disruptive behavior)

## 2022-04-22 NOTE — DISCHARGE NOTE PROVIDER - NSDCCAREPROVSEEN_GEN_ALL_CORE_FT
Hamlet Barbosa, Avila Martinez, Fela Beyer, Naun Belle, Bhavya Mukherjee, Ernesto
partial weight-bearing/LLE

## 2022-04-22 NOTE — PROGRESS NOTE ADULT - ASSESSMENT
37 yo F, from group home, PMHx of hypothyroidism schizoaffective disorder, bipolar disorder, mild intellectual disabilities asthma, endometriosis, DVT/Pe (factor V Leiden def), post pituitary gland mass, seizure( hx of pseudoseizure per records from [facility]) came with chief complain of chest pain, back pain and mild sob since yesterday,COVID +,sinus tach.  1.Atypical chest pain, no further cardiac work-up needed. Reports has chronic tachycardia.  2.DVT-eliquis.  3.Schizoaffective-Psych f/u,cont current meds.  4.Hypothyroidism-synthroid.  5.PPI.  6.Echocardiogram can be done as outpatient.  7.COVID-symptomatic support.  8.Back pain-lidocaine patch.

## 2022-05-22 ENCOUNTER — INPATIENT (INPATIENT)
Facility: HOSPITAL | Age: 37
LOS: 3 days | Discharge: HOME | End: 2022-05-26
Attending: INTERNAL MEDICINE | Admitting: INTERNAL MEDICINE
Payer: MEDICARE

## 2022-05-22 VITALS
HEART RATE: 111 BPM | DIASTOLIC BLOOD PRESSURE: 85 MMHG | WEIGHT: 248.02 LBS | SYSTOLIC BLOOD PRESSURE: 125 MMHG | OXYGEN SATURATION: 97 % | HEIGHT: 68 IN | RESPIRATION RATE: 16 BRPM | TEMPERATURE: 98 F

## 2022-05-22 DIAGNOSIS — Z86.718 PERSONAL HISTORY OF OTHER VENOUS THROMBOSIS AND EMBOLISM: ICD-10-CM

## 2022-05-22 DIAGNOSIS — F60.3 BORDERLINE PERSONALITY DISORDER: ICD-10-CM

## 2022-05-22 DIAGNOSIS — I10 ESSENTIAL (PRIMARY) HYPERTENSION: ICD-10-CM

## 2022-05-22 DIAGNOSIS — D68.51 ACTIVATED PROTEIN C RESISTANCE: ICD-10-CM

## 2022-05-22 DIAGNOSIS — Z79.01 LONG TERM (CURRENT) USE OF ANTICOAGULANTS: ICD-10-CM

## 2022-05-22 DIAGNOSIS — Z91.018 ALLERGY TO OTHER FOODS: ICD-10-CM

## 2022-05-22 DIAGNOSIS — Z79.899 OTHER LONG TERM (CURRENT) DRUG THERAPY: ICD-10-CM

## 2022-05-22 DIAGNOSIS — F31.9 BIPOLAR DISORDER, UNSPECIFIED: ICD-10-CM

## 2022-05-22 DIAGNOSIS — G40.901 EPILEPSY, UNSPECIFIED, NOT INTRACTABLE, WITH STATUS EPILEPTICUS: ICD-10-CM

## 2022-05-22 DIAGNOSIS — E66.01 MORBID (SEVERE) OBESITY DUE TO EXCESS CALORIES: ICD-10-CM

## 2022-05-22 DIAGNOSIS — Z91.51 PERSONAL HISTORY OF SUICIDAL BEHAVIOR: ICD-10-CM

## 2022-05-22 DIAGNOSIS — K21.9 GASTRO-ESOPHAGEAL REFLUX DISEASE WITHOUT ESOPHAGITIS: ICD-10-CM

## 2022-05-22 DIAGNOSIS — F63.9 IMPULSE DISORDER, UNSPECIFIED: ICD-10-CM

## 2022-05-22 DIAGNOSIS — Z88.5 ALLERGY STATUS TO NARCOTIC AGENT: ICD-10-CM

## 2022-05-22 DIAGNOSIS — G47.00 INSOMNIA, UNSPECIFIED: ICD-10-CM

## 2022-05-22 DIAGNOSIS — J45.909 UNSPECIFIED ASTHMA, UNCOMPLICATED: ICD-10-CM

## 2022-05-22 DIAGNOSIS — Z88.0 ALLERGY STATUS TO PENICILLIN: ICD-10-CM

## 2022-05-22 DIAGNOSIS — Z79.890 HORMONE REPLACEMENT THERAPY: ICD-10-CM

## 2022-05-22 DIAGNOSIS — N80.9 ENDOMETRIOSIS, UNSPECIFIED: ICD-10-CM

## 2022-05-22 DIAGNOSIS — F79 UNSPECIFIED INTELLECTUAL DISABILITIES: ICD-10-CM

## 2022-05-22 DIAGNOSIS — E03.9 HYPOTHYROIDISM, UNSPECIFIED: ICD-10-CM

## 2022-05-22 DIAGNOSIS — Z91.040 LATEX ALLERGY STATUS: ICD-10-CM

## 2022-05-22 LAB
ALBUMIN SERPL ELPH-MCNC: 3.4 G/DL — SIGNIFICANT CHANGE UP (ref 3.3–5)
ALP SERPL-CCNC: 56 U/L — SIGNIFICANT CHANGE UP (ref 40–120)
ALT FLD-CCNC: 52 U/L — SIGNIFICANT CHANGE UP (ref 12–78)
ANION GAP SERPL CALC-SCNC: 7 MMOL/L — SIGNIFICANT CHANGE UP (ref 5–17)
APPEARANCE UR: CLEAR — SIGNIFICANT CHANGE UP
AST SERPL-CCNC: 25 U/L — SIGNIFICANT CHANGE UP (ref 15–37)
BASOPHILS # BLD AUTO: 0.02 K/UL — SIGNIFICANT CHANGE UP (ref 0–0.2)
BASOPHILS NFR BLD AUTO: 0.3 % — SIGNIFICANT CHANGE UP (ref 0–2)
BILIRUB SERPL-MCNC: 0.2 MG/DL — SIGNIFICANT CHANGE UP (ref 0.2–1.2)
BILIRUB UR-MCNC: NEGATIVE — SIGNIFICANT CHANGE UP
BUN SERPL-MCNC: 10 MG/DL — SIGNIFICANT CHANGE UP (ref 7–23)
CALCIUM SERPL-MCNC: 8.9 MG/DL — SIGNIFICANT CHANGE UP (ref 8.5–10.1)
CHLORIDE SERPL-SCNC: 108 MMOL/L — SIGNIFICANT CHANGE UP (ref 96–108)
CO2 SERPL-SCNC: 26 MMOL/L — SIGNIFICANT CHANGE UP (ref 22–31)
COLOR SPEC: YELLOW — SIGNIFICANT CHANGE UP
CREAT SERPL-MCNC: 0.92 MG/DL — SIGNIFICANT CHANGE UP (ref 0.5–1.3)
DIFF PNL FLD: NEGATIVE — SIGNIFICANT CHANGE UP
EGFR: 84 ML/MIN/1.73M2 — SIGNIFICANT CHANGE UP
EOSINOPHIL # BLD AUTO: 0.04 K/UL — SIGNIFICANT CHANGE UP (ref 0–0.5)
EOSINOPHIL NFR BLD AUTO: 0.6 % — SIGNIFICANT CHANGE UP (ref 0–6)
EPI CELLS # UR: ABNORMAL
GLUCOSE BLDC GLUCOMTR-MCNC: 70 MG/DL — SIGNIFICANT CHANGE UP (ref 70–99)
GLUCOSE BLDC GLUCOMTR-MCNC: 90 MG/DL — SIGNIFICANT CHANGE UP (ref 70–99)
GLUCOSE SERPL-MCNC: 82 MG/DL — SIGNIFICANT CHANGE UP (ref 70–99)
GLUCOSE UR QL: NEGATIVE MG/DL — SIGNIFICANT CHANGE UP
HCG SERPL-ACNC: <1 MIU/ML — SIGNIFICANT CHANGE UP
HCT VFR BLD CALC: 37.1 % — SIGNIFICANT CHANGE UP (ref 34.5–45)
HGB BLD-MCNC: 11.7 G/DL — SIGNIFICANT CHANGE UP (ref 11.5–15.5)
IMM GRANULOCYTES NFR BLD AUTO: 0.6 % — SIGNIFICANT CHANGE UP (ref 0–1.5)
KETONES UR-MCNC: NEGATIVE — SIGNIFICANT CHANGE UP
LEUKOCYTE ESTERASE UR-ACNC: NEGATIVE — SIGNIFICANT CHANGE UP
LIDOCAIN IGE QN: 100 U/L — SIGNIFICANT CHANGE UP (ref 73–393)
LYMPHOCYTES # BLD AUTO: 2.16 K/UL — SIGNIFICANT CHANGE UP (ref 1–3.3)
LYMPHOCYTES # BLD AUTO: 30.7 % — SIGNIFICANT CHANGE UP (ref 13–44)
MCHC RBC-ENTMCNC: 29.8 PG — SIGNIFICANT CHANGE UP (ref 27–34)
MCHC RBC-ENTMCNC: 31.5 G/DL — LOW (ref 32–36)
MCV RBC AUTO: 94.6 FL — SIGNIFICANT CHANGE UP (ref 80–100)
MONOCYTES # BLD AUTO: 0.62 K/UL — SIGNIFICANT CHANGE UP (ref 0–0.9)
MONOCYTES NFR BLD AUTO: 8.8 % — SIGNIFICANT CHANGE UP (ref 2–14)
NEUTROPHILS # BLD AUTO: 4.15 K/UL — SIGNIFICANT CHANGE UP (ref 1.8–7.4)
NEUTROPHILS NFR BLD AUTO: 59 % — SIGNIFICANT CHANGE UP (ref 43–77)
NITRITE UR-MCNC: NEGATIVE — SIGNIFICANT CHANGE UP
NRBC # BLD: 0 /100 WBCS — SIGNIFICANT CHANGE UP (ref 0–0)
NT-PROBNP SERPL-SCNC: 130 PG/ML — HIGH (ref 0–125)
PH UR: 6.5 — SIGNIFICANT CHANGE UP (ref 5–8)
PLATELET # BLD AUTO: 107 K/UL — LOW (ref 150–400)
POTASSIUM SERPL-MCNC: 4.7 MMOL/L — SIGNIFICANT CHANGE UP (ref 3.5–5.3)
POTASSIUM SERPL-SCNC: 4.7 MMOL/L — SIGNIFICANT CHANGE UP (ref 3.5–5.3)
PROT SERPL-MCNC: 7.3 GM/DL — SIGNIFICANT CHANGE UP (ref 6–8.3)
PROT UR-MCNC: 15 MG/DL
RBC # BLD: 3.92 M/UL — SIGNIFICANT CHANGE UP (ref 3.8–5.2)
RBC # FLD: 15.9 % — HIGH (ref 10.3–14.5)
SODIUM SERPL-SCNC: 141 MMOL/L — SIGNIFICANT CHANGE UP (ref 135–145)
SP GR SPEC: 1.01 — SIGNIFICANT CHANGE UP (ref 1.01–1.02)
UROBILINOGEN FLD QL: NEGATIVE MG/DL — SIGNIFICANT CHANGE UP
WBC # BLD: 7.03 K/UL — SIGNIFICANT CHANGE UP (ref 3.8–10.5)
WBC # FLD AUTO: 7.03 K/UL — SIGNIFICANT CHANGE UP (ref 3.8–10.5)
WBC UR QL: SIGNIFICANT CHANGE UP

## 2022-05-22 PROCEDURE — 31500 INSERT EMERGENCY AIRWAY: CPT

## 2022-05-22 PROCEDURE — 74176 CT ABD & PELVIS W/O CONTRAST: CPT | Mod: 26,MC

## 2022-05-22 PROCEDURE — 99291 CRITICAL CARE FIRST HOUR: CPT | Mod: 25

## 2022-05-22 RX ORDER — POLYETHYLENE GLYCOL 3350 17 G/17G
17 POWDER, FOR SOLUTION ORAL
Qty: 1 | Refills: 0
Start: 2022-05-22 | End: 2022-05-28

## 2022-05-22 RX ORDER — ACETAMINOPHEN 500 MG
1000 TABLET ORAL ONCE
Refills: 0 | Status: DISCONTINUED | OUTPATIENT
Start: 2022-05-22 | End: 2022-05-22

## 2022-05-22 RX ORDER — CHLORPROMAZINE HCL 10 MG
50 TABLET ORAL ONCE
Refills: 0 | Status: COMPLETED | OUTPATIENT
Start: 2022-05-22 | End: 2022-05-22

## 2022-05-22 RX ORDER — DEXMEDETOMIDINE HYDROCHLORIDE IN 0.9% SODIUM CHLORIDE 4 UG/ML
0.2 INJECTION INTRAVENOUS
Qty: 400 | Refills: 0 | Status: DISCONTINUED | OUTPATIENT
Start: 2022-05-22 | End: 2022-05-22

## 2022-05-22 RX ORDER — METOCLOPRAMIDE HCL 10 MG
10 TABLET ORAL ONCE
Refills: 0 | Status: DISCONTINUED | OUTPATIENT
Start: 2022-05-22 | End: 2022-05-22

## 2022-05-22 RX ORDER — SODIUM CHLORIDE 9 MG/ML
1000 INJECTION INTRAMUSCULAR; INTRAVENOUS; SUBCUTANEOUS ONCE
Refills: 0 | Status: DISCONTINUED | OUTPATIENT
Start: 2022-05-22 | End: 2022-05-22

## 2022-05-22 RX ORDER — DIPHENHYDRAMINE HCL 50 MG
25 CAPSULE ORAL ONCE
Refills: 0 | Status: COMPLETED | OUTPATIENT
Start: 2022-05-22 | End: 2022-05-22

## 2022-05-22 RX ORDER — DEXMEDETOMIDINE HYDROCHLORIDE IN 0.9% SODIUM CHLORIDE 4 UG/ML
0.02 INJECTION INTRAVENOUS
Qty: 200 | Refills: 0 | Status: DISCONTINUED | OUTPATIENT
Start: 2022-05-22 | End: 2022-05-23

## 2022-05-22 RX ORDER — QUETIAPINE FUMARATE 200 MG/1
200 TABLET, FILM COATED ORAL ONCE
Refills: 0 | Status: COMPLETED | OUTPATIENT
Start: 2022-05-22 | End: 2022-05-22

## 2022-05-22 RX ORDER — DIVALPROEX SODIUM 500 MG/1
1000 TABLET, DELAYED RELEASE ORAL ONCE
Refills: 0 | Status: COMPLETED | OUTPATIENT
Start: 2022-05-22 | End: 2022-05-22

## 2022-05-22 RX ORDER — FAMOTIDINE 10 MG/ML
20 INJECTION INTRAVENOUS ONCE
Refills: 0 | Status: DISCONTINUED | OUTPATIENT
Start: 2022-05-22 | End: 2022-05-22

## 2022-05-22 RX ADMIN — Medication 25 MILLIGRAM(S): at 22:05

## 2022-05-22 NOTE — ED PROVIDER NOTE - OBJECTIVE STATEMENT
36F w/ PMH asthma, HTN, epilepsy, factor 5 Leiden deficiency, hypothyroid brought to ED from her group home for mid-abd pain radiating into back onset this AM a/w nausea. Pt endorses rapid breathing, and pain w/ BMs, constipation. Pt reports hx constipation, states she sometimes is prescribed medication, but does not have any currently. Denies fever, chills, h/a, CP, cough, vomiting, diarrhea, UTI sx. Pt reports painful BLE swelling since yesterday. Denies recent travel, sick contacts, new / spoiled foods. Pt reports hx similar pain in the past.     PMH as above, PSH spinal, meds / allergies as per EMR. LMP unknown / denies pregnancy.

## 2022-05-22 NOTE — ED ADULT NURSE REASSESSMENT NOTE - NS ED NURSE REASSESS COMMENT FT1
Pt dc'd, preparing to giv ept dc meds when pt on bed seizing. hx seizures Pt dc'd, preparing to giv epi dc meds when pt on bed seizing. hx seizures. Pt currently intubated but fighting vent. MD Bolaños notified. Stated to increase propofol from 40mcg to 80mcg. MD Bolaños aware. Report given to Odalys BURKETT RN 23:42

## 2022-05-22 NOTE — ED PROVIDER NOTE - CLINICAL SUMMARY MEDICAL DECISION MAKING FREE TEXT BOX
36F w/ PMH HTN, asthma, hypothyroid, factor 5 Leiden deficiency, epilepsy pw mid abd pain rad to back a/w nausea, rapid breathing, constipation, pain w/ BMs onset this AM. AF, VSS. Pt crying / inconsolable, but otherwise well appearing. Plan: CBC, CMP, lipase, HCG, UA/C, CT AP, BNP. Give Tylenol, Pepcid, Reglan, IVF. Re-eval. 36F w/ PMH HTN, asthma, hypothyroid, factor 5 Leiden deficiency, epilepsy pw mid abd pain rad to back a/w nausea, rapid breathing, constipation, pain w/ BMs onset this AM. AF, VSS. Pt crying / inconsolable, but otherwise well appearing. Plan: CBC, CMP, lipase, HCG, UA/C, CT AP, BNP. Give Tylenol, Pepcid, Reglan, Benadryl, IVF. Re-eval.

## 2022-05-22 NOTE — ED ADULT NURSE NOTE - NSIMPLEMENTINTERV_GEN_ALL_ED
Implemented All Fall Risk Interventions:  [place] to call system. Call bell, personal items and telephone within reach. Instruct patient to call for assistance. Room bathroom lighting operational. Non-slip footwear when patient is off stretcher. Physically safe environment: no spills, clutter or unnecessary equipment. Stretcher in lowest position, wheels locked, appropriate side rails in place. Provide visual cue, wrist band, yellow gown, etc. Monitor gait and stability. Monitor for mental status changes and reorient to person, place, and time. Review medications for side effects contributing to fall risk. Reinforce activity limits and safety measures with patient and family.

## 2022-05-22 NOTE — ED ADULT NURSE NOTE - OBJECTIVE STATEMENT
pt from [facility], staff left. pt c/o bilateral feet swelling since yesterday. pt also c/o nausea, lower abdominal pain, weakness, dizziness since this morning. pt denies vomiting.

## 2022-05-22 NOTE — ED PROVIDER NOTE - PROGRESS NOTE DETAILS
On d/c, pt slumped in hallway, seizing, given ativan 2mg IM, IV established, ativan 2mg IV, Keppra 1g IV, mag 2g, pt still w/ seizure, intubate, propofol, ICU consulted icu / rt states pt known to them, hx pseudo seizure, frequent intubation ICU and RT team members state pt is known to them, frequent history of pseudo-seizures / frequently intubated. Precedex ordered for additional sedation. W/u w/o significant abnormalities. On re-eval, resting comfortably, in NAD. Pt requesting night time home medications, ordered. Stable for d/c to [facility]. Given script for Miralax. Instructed for PCP f/u. On discharge, pt noted to be slumped in stretcher, + seizure-like activity, given ativan 2mg IM, IV established, given ativan 2mg IV, Keppra 1g IV, mag 2g. Pt still w/ seizure-like activity x 15min. Plan: intubate, propofol, ECG, CXR, ICU consult.

## 2022-05-22 NOTE — ED ADULT NURSE NOTE - ED STAT RN HANDOFF DETAILS
received report from Keshia MAYNARD. as per RN, pt intubated due to status epilepticus. received pt on ventilator with propofol infusing at 60Ml/hr. pt noted to be restless, precedex infusion to be initiated. pt to be admitted to ICU. VSS.

## 2022-05-22 NOTE — ED PROVIDER NOTE - PHYSICAL EXAMINATION
GEN: Awake, alert, interactive, crying / inconsolable.   HEAD AND NECK: NC/AT. Airway patent. Neck supple.   EYES:  Clear b/l. EOMI. PERRL.   ENT: Moist mucus membranes.   CARDIAC: Regular rate, regular rhythm. No evident pedal edema.    RESP/CHEST: Normal respiratory effort with no use of accessory muscles or retractions. Clear throughout on auscultation.  ABD: Soft, non-distended, non-tender. No rebound, no guarding.   BACK: No midline spinal TTP. No CVAT.   EXTREMITIES: Moving all extremities with no apparent deformities.   SKIN: Warm, dry, intact normal color. No rash.   NEURO: AOx3, CN II-XII grossly intact, no focal deficits.   PSYCH: Appropriate mood and affect.

## 2022-05-23 DIAGNOSIS — I26.99 OTHER PULMONARY EMBOLISM WITHOUT ACUTE COR PULMONALE: ICD-10-CM

## 2022-05-23 LAB
ALBUMIN SERPL ELPH-MCNC: 3 G/DL — LOW (ref 3.3–5)
ALP SERPL-CCNC: 45 U/L — SIGNIFICANT CHANGE UP (ref 40–120)
ALT FLD-CCNC: 51 U/L — SIGNIFICANT CHANGE UP (ref 12–78)
ANION GAP SERPL CALC-SCNC: 7 MMOL/L — SIGNIFICANT CHANGE UP (ref 5–17)
AST SERPL-CCNC: 27 U/L — SIGNIFICANT CHANGE UP (ref 15–37)
BASE EXCESS BLDA CALC-SCNC: 1.2 MMOL/L — SIGNIFICANT CHANGE UP (ref -2–3)
BILIRUB SERPL-MCNC: 0.3 MG/DL — SIGNIFICANT CHANGE UP (ref 0.2–1.2)
BLOOD GAS COMMENTS ARTERIAL: SIGNIFICANT CHANGE UP
BLOOD GAS COMMENTS ARTERIAL: SIGNIFICANT CHANGE UP
BUN SERPL-MCNC: 9 MG/DL — SIGNIFICANT CHANGE UP (ref 7–23)
CALCIUM SERPL-MCNC: 8.6 MG/DL — SIGNIFICANT CHANGE UP (ref 8.5–10.1)
CHLORIDE SERPL-SCNC: 110 MMOL/L — HIGH (ref 96–108)
CO2 BLDA-SCNC: 27 MMOL/L — HIGH (ref 19–24)
CO2 SERPL-SCNC: 25 MMOL/L — SIGNIFICANT CHANGE UP (ref 22–31)
CREAT SERPL-MCNC: 0.68 MG/DL — SIGNIFICANT CHANGE UP (ref 0.5–1.3)
EGFR: 117 ML/MIN/1.73M2 — SIGNIFICANT CHANGE UP
GAS PNL BLDA: SIGNIFICANT CHANGE UP
GLUCOSE SERPL-MCNC: 89 MG/DL — SIGNIFICANT CHANGE UP (ref 70–99)
HCO3 BLDA-SCNC: 26 MMOL/L — SIGNIFICANT CHANGE UP (ref 21–28)
HCT VFR BLD CALC: 33 % — LOW (ref 34.5–45)
HGB BLD-MCNC: 10.7 G/DL — LOW (ref 11.5–15.5)
HOROWITZ INDEX BLDA+IHG-RTO: 50 — SIGNIFICANT CHANGE UP
MAGNESIUM SERPL-MCNC: 2.2 MG/DL — SIGNIFICANT CHANGE UP (ref 1.6–2.6)
MCHC RBC-ENTMCNC: 30.1 PG — SIGNIFICANT CHANGE UP (ref 27–34)
MCHC RBC-ENTMCNC: 32.4 G/DL — SIGNIFICANT CHANGE UP (ref 32–36)
MCV RBC AUTO: 93 FL — SIGNIFICANT CHANGE UP (ref 80–100)
NRBC # BLD: 0 /100 WBCS — SIGNIFICANT CHANGE UP (ref 0–0)
PCO2 BLDA: 41 MMHG — SIGNIFICANT CHANGE UP (ref 32–46)
PH BLDA: 7.41 — SIGNIFICANT CHANGE UP (ref 7.35–7.45)
PHOSPHATE SERPL-MCNC: 3.7 MG/DL — SIGNIFICANT CHANGE UP (ref 2.5–4.5)
PLATELET # BLD AUTO: 137 K/UL — LOW (ref 150–400)
PO2 BLDA: 153 MMHG — HIGH (ref 83–108)
POTASSIUM SERPL-MCNC: 3.8 MMOL/L — SIGNIFICANT CHANGE UP (ref 3.5–5.3)
POTASSIUM SERPL-SCNC: 3.8 MMOL/L — SIGNIFICANT CHANGE UP (ref 3.5–5.3)
PROT SERPL-MCNC: 6.4 GM/DL — SIGNIFICANT CHANGE UP (ref 6–8.3)
RBC # BLD: 3.55 M/UL — LOW (ref 3.8–5.2)
RBC # FLD: 15.8 % — HIGH (ref 10.3–14.5)
SAO2 % BLDA: 99.9 % — HIGH (ref 94–98)
SODIUM SERPL-SCNC: 142 MMOL/L — SIGNIFICANT CHANGE UP (ref 135–145)
WBC # BLD: 5.98 K/UL — SIGNIFICANT CHANGE UP (ref 3.8–10.5)
WBC # FLD AUTO: 5.98 K/UL — SIGNIFICANT CHANGE UP (ref 3.8–10.5)

## 2022-05-23 PROCEDURE — 70450 CT HEAD/BRAIN W/O DYE: CPT | Mod: 26

## 2022-05-23 PROCEDURE — 99291 CRITICAL CARE FIRST HOUR: CPT

## 2022-05-23 PROCEDURE — 71045 X-RAY EXAM CHEST 1 VIEW: CPT | Mod: 26,77

## 2022-05-23 PROCEDURE — 93010 ELECTROCARDIOGRAM REPORT: CPT

## 2022-05-23 PROCEDURE — 95816 EEG AWAKE AND DROWSY: CPT | Mod: 26

## 2022-05-23 PROCEDURE — 71045 X-RAY EXAM CHEST 1 VIEW: CPT | Mod: 26

## 2022-05-23 RX ORDER — ENOXAPARIN SODIUM 100 MG/ML
40 INJECTION SUBCUTANEOUS EVERY 12 HOURS
Refills: 0 | Status: DISCONTINUED | OUTPATIENT
Start: 2022-05-23 | End: 2022-05-23

## 2022-05-23 RX ORDER — QUETIAPINE FUMARATE 200 MG/1
50 TABLET, FILM COATED ORAL ONCE
Refills: 0 | Status: COMPLETED | OUTPATIENT
Start: 2022-05-23 | End: 2022-05-23

## 2022-05-23 RX ORDER — LEVOTHYROXINE SODIUM 125 MCG
37.5 TABLET ORAL AT BEDTIME
Refills: 0 | Status: DISCONTINUED | OUTPATIENT
Start: 2022-05-23 | End: 2022-05-23

## 2022-05-23 RX ORDER — LORATADINE 10 MG/1
10 TABLET ORAL DAILY
Refills: 0 | Status: DISCONTINUED | OUTPATIENT
Start: 2022-05-23 | End: 2022-05-26

## 2022-05-23 RX ORDER — PROPOFOL 10 MG/ML
20 INJECTION, EMULSION INTRAVENOUS
Qty: 1000 | Refills: 0 | Status: DISCONTINUED | OUTPATIENT
Start: 2022-05-23 | End: 2022-05-23

## 2022-05-23 RX ORDER — DEXMEDETOMIDINE HYDROCHLORIDE IN 0.9% SODIUM CHLORIDE 4 UG/ML
0.02 INJECTION INTRAVENOUS
Qty: 200 | Refills: 0 | Status: DISCONTINUED | OUTPATIENT
Start: 2022-05-23 | End: 2022-05-24

## 2022-05-23 RX ORDER — LEVOTHYROXINE SODIUM 125 MCG
75 TABLET ORAL DAILY
Refills: 0 | Status: DISCONTINUED | OUTPATIENT
Start: 2022-05-23 | End: 2022-05-26

## 2022-05-23 RX ORDER — CHLORHEXIDINE GLUCONATE 213 G/1000ML
15 SOLUTION TOPICAL EVERY 12 HOURS
Refills: 0 | Status: DISCONTINUED | OUTPATIENT
Start: 2022-05-23 | End: 2022-05-23

## 2022-05-23 RX ORDER — PROPOFOL 10 MG/ML
18.38 INJECTION, EMULSION INTRAVENOUS
Qty: 1000 | Refills: 0 | Status: DISCONTINUED | OUTPATIENT
Start: 2022-05-23 | End: 2022-05-24

## 2022-05-23 RX ORDER — QUETIAPINE FUMARATE 200 MG/1
200 TABLET, FILM COATED ORAL
Refills: 0 | Status: DISCONTINUED | OUTPATIENT
Start: 2022-05-23 | End: 2022-05-26

## 2022-05-23 RX ORDER — APIXABAN 2.5 MG/1
5 TABLET, FILM COATED ORAL EVERY 12 HOURS
Refills: 0 | Status: DISCONTINUED | OUTPATIENT
Start: 2022-05-23 | End: 2022-05-26

## 2022-05-23 RX ORDER — ARIPIPRAZOLE 15 MG/1
30 TABLET ORAL DAILY
Refills: 0 | Status: DISCONTINUED | OUTPATIENT
Start: 2022-05-23 | End: 2022-05-26

## 2022-05-23 RX ORDER — CHLORHEXIDINE GLUCONATE 213 G/1000ML
1 SOLUTION TOPICAL
Refills: 0 | Status: DISCONTINUED | OUTPATIENT
Start: 2022-05-23 | End: 2022-05-26

## 2022-05-23 RX ORDER — VALPROIC ACID (AS SODIUM SALT) 250 MG/5ML
1000 SOLUTION, ORAL ORAL AT BEDTIME
Refills: 0 | Status: DISCONTINUED | OUTPATIENT
Start: 2022-05-23 | End: 2022-05-24

## 2022-05-23 RX ORDER — VALPROIC ACID (AS SODIUM SALT) 250 MG/5ML
500 SOLUTION, ORAL ORAL DAILY
Refills: 0 | Status: DISCONTINUED | OUTPATIENT
Start: 2022-05-23 | End: 2022-05-24

## 2022-05-23 RX ORDER — QUETIAPINE FUMARATE 200 MG/1
50 TABLET, FILM COATED ORAL
Refills: 0 | Status: DISCONTINUED | OUTPATIENT
Start: 2022-05-24 | End: 2022-05-26

## 2022-05-23 RX ORDER — PANTOPRAZOLE SODIUM 20 MG/1
40 TABLET, DELAYED RELEASE ORAL DAILY
Refills: 0 | Status: DISCONTINUED | OUTPATIENT
Start: 2022-05-23 | End: 2022-05-24

## 2022-05-23 RX ORDER — LURASIDONE HYDROCHLORIDE 40 MG/1
40 TABLET ORAL DAILY
Refills: 0 | Status: DISCONTINUED | OUTPATIENT
Start: 2022-05-23 | End: 2022-05-26

## 2022-05-23 RX ADMIN — PANTOPRAZOLE SODIUM 40 MILLIGRAM(S): 20 TABLET, DELAYED RELEASE ORAL at 13:05

## 2022-05-23 RX ADMIN — PROPOFOL 13.5 MICROGRAM(S)/KG/MIN: 10 INJECTION, EMULSION INTRAVENOUS at 13:06

## 2022-05-23 RX ADMIN — PROPOFOL 13.5 MICROGRAM(S)/KG/MIN: 10 INJECTION, EMULSION INTRAVENOUS at 01:36

## 2022-05-23 RX ADMIN — DEXMEDETOMIDINE HYDROCHLORIDE IN 0.9% SODIUM CHLORIDE 0.56 MICROGRAM(S)/KG/HR: 4 INJECTION INTRAVENOUS at 13:06

## 2022-05-23 RX ADMIN — LORATADINE 10 MILLIGRAM(S): 10 TABLET ORAL at 13:06

## 2022-05-23 RX ADMIN — PROPOFOL 13.5 MICROGRAM(S)/KG/MIN: 10 INJECTION, EMULSION INTRAVENOUS at 04:09

## 2022-05-23 RX ADMIN — LURASIDONE HYDROCHLORIDE 40 MILLIGRAM(S): 40 TABLET ORAL at 17:55

## 2022-05-23 RX ADMIN — CHLORHEXIDINE GLUCONATE 1 APPLICATION(S): 213 SOLUTION TOPICAL at 03:30

## 2022-05-23 RX ADMIN — Medication 75 MICROGRAM(S): at 17:43

## 2022-05-23 RX ADMIN — DEXMEDETOMIDINE HYDROCHLORIDE IN 0.9% SODIUM CHLORIDE 0.56 MICROGRAM(S)/KG/HR: 4 INJECTION INTRAVENOUS at 04:09

## 2022-05-23 RX ADMIN — DEXMEDETOMIDINE HYDROCHLORIDE IN 0.9% SODIUM CHLORIDE 0.56 MICROGRAM(S)/KG/HR: 4 INJECTION INTRAVENOUS at 15:23

## 2022-05-23 RX ADMIN — DEXMEDETOMIDINE HYDROCHLORIDE IN 0.9% SODIUM CHLORIDE 0.56 MICROGRAM(S)/KG/HR: 4 INJECTION INTRAVENOUS at 21:42

## 2022-05-23 RX ADMIN — APIXABAN 5 MILLIGRAM(S): 2.5 TABLET, FILM COATED ORAL at 17:43

## 2022-05-23 RX ADMIN — PROPOFOL 13.5 MICROGRAM(S)/KG/MIN: 10 INJECTION, EMULSION INTRAVENOUS at 11:44

## 2022-05-23 RX ADMIN — ARIPIPRAZOLE 30 MILLIGRAM(S): 15 TABLET ORAL at 17:43

## 2022-05-23 RX ADMIN — PROPOFOL 13.5 MICROGRAM(S)/KG/MIN: 10 INJECTION, EMULSION INTRAVENOUS at 06:53

## 2022-05-23 RX ADMIN — PROPOFOL 13.5 MICROGRAM(S)/KG/MIN: 10 INJECTION, EMULSION INTRAVENOUS at 17:58

## 2022-05-23 RX ADMIN — QUETIAPINE FUMARATE 200 MILLIGRAM(S): 200 TABLET, FILM COATED ORAL at 19:40

## 2022-05-23 RX ADMIN — PROPOFOL 13.5 MICROGRAM(S)/KG/MIN: 10 INJECTION, EMULSION INTRAVENOUS at 21:42

## 2022-05-23 RX ADMIN — Medication 0.5 MILLIGRAM(S): at 15:52

## 2022-05-23 RX ADMIN — CHLORHEXIDINE GLUCONATE 15 MILLILITER(S): 213 SOLUTION TOPICAL at 17:42

## 2022-05-23 RX ADMIN — Medication 55 MILLIGRAM(S): at 13:06

## 2022-05-23 RX ADMIN — DEXMEDETOMIDINE HYDROCHLORIDE IN 0.9% SODIUM CHLORIDE 0.56 MICROGRAM(S)/KG/HR: 4 INJECTION INTRAVENOUS at 00:09

## 2022-05-23 RX ADMIN — QUETIAPINE FUMARATE 50 MILLIGRAM(S): 200 TABLET, FILM COATED ORAL at 15:51

## 2022-05-23 RX ADMIN — Medication 60 MILLIGRAM(S): at 21:03

## 2022-05-23 RX ADMIN — PROPOFOL 13.5 MICROGRAM(S)/KG/MIN: 10 INJECTION, EMULSION INTRAVENOUS at 08:37

## 2022-05-23 RX ADMIN — CHLORHEXIDINE GLUCONATE 15 MILLILITER(S): 213 SOLUTION TOPICAL at 06:53

## 2022-05-23 NOTE — H&P ADULT - CRITICAL CARE ATTENDING COMMENT
Agree with above  36F with Factor V Leiden, asthma, schizoaffective disorder, seizure D/O, psych hospitalizations from group home p/w abdominal pain c/b possible seizure-like activity, given Ativan and intubated  Now on propofol / Precedex  c/w depakote, resume all psych meds  Minimal oxygen requirements, 16/450/35%/+5, decreased RR to 12  Keep intubated for now; titrate propofol off. If no seizure activity, consider extubation  Patient will remain in ICU while intubated

## 2022-05-23 NOTE — AIRWAY REMOVAL NOTE  ADULT & PEDS - NS REMOVAL  OF ARTIFICAL AIRWAY STRIDOR
Arrived ambulatory with walker for prolia injection. Indication and side effects reviewed. Denies recent dental work. Labs and medications verified.pt encouraged to take calcium and vit D supp per tx plan and to verify with prescribing MD.  Prolia administered in left  arm without incidence. Instructed to call prescribing MD for any concerns or questions and instructed on how to schedule future appts.  Pt vu and discharged ambulatory.     no

## 2022-05-23 NOTE — PROGRESS NOTE ADULT - SUBJECTIVE AND OBJECTIVE BOX
seen on night rounds with ICU team      24 hr events:  intubated in ED for seizure like activity  EEG negative for seizures  pt arouses, tries to pull at tubes  weaned and extubated tonight to nasal cannula    ## ROS:  [x ] unable to obtain due to sedation      ## Labs:  CBC:                        10.7   5.98  )-----------( 137      ( 23 May 2022 05:20 )             33.0     Chem:  05-23    142  |  110<H>  |  9   ----------------------------<  89  3.8   |  25  |  0.68    Ca    8.6      23 May 2022 05:20  Phos  3.7     05-23  Mg     2.2     05-23    TPro  6.4  /  Alb  3.0<L>  /  TBili  0.3  /  DBili  x   /  AST  27  /  ALT  51  /  AlkPhos  45  05-23      ## Imaging:  EEG Abnormal EEG study.    - Mild to Moderate nonspecific diffuse or multifocal cerebral dysfunction -- which may be due to sedation.  - Excess diffuse beta activity may be seen with benzodiazepines or sedative medications use.  - No epileptiform patterns or seizures recorded.      ## Medications:  loratadine 10 milliGRAM(s) Oral daily    levothyroxine 75 MICROGram(s) Oral daily    apixaban 5 milliGRAM(s) Oral every 12 hours    pantoprazole  Injectable 40 milliGRAM(s) IV Push daily    ARIPiprazole 30 milliGRAM(s) Oral daily  dexMEDEtomidine Infusion 0.018 MICROgram(s)/kG/Hr IV Continuous <Continuous>  LORazepam     Tablet 0.5 milliGRAM(s) Oral <User Schedule>  lurasidone 40 milliGRAM(s) Oral daily    QUEtiapine 200 milliGRAM(s) Oral <User Schedule>  valproate sodium  IVPB 500 milliGRAM(s) IV Intermittent daily  valproate sodium  IVPB 1000 milliGRAM(s) IV Intermittent at bedtime      ## Vitals:  T(C): 36.6 (05-23-22 @ 23:51), Max: 36.6 (05-23-22 @ 23:51)  HR: 80 (05-23-22 @ 23:00) (56 - 109)  BP: 120/82 (05-23-22 @ 23:00) (81/56 - 123/70)  BP(mean): 92 (05-23-22 @ 23:00) (58 - 92)  RR: 26 (05-23-22 @ 23:00) (11 - 27)  SpO2: 100% (05-23-22 @ 23:00) (99% - 100%)    Vent: Mode: CPAP with PS, TV (machine): 837, FiO2: 25, PEEP: 5, PS: 5    ABG: ABG - ( 23 May 2022 00:20 )  pH, Arterial: 7.41  pH, Blood: x     /  pCO2: 41    /  pO2: 153   / HCO3: 26    / Base Excess: 1.2   /  SaO2: 99.9            05-22 @ 07:01  -  05-23 @ 07:00  --------------------------------------------------------  IN: 212 mL / OUT: 0 mL / NET: 212 mL    05-23 @ 07:01  -  05-23 @ 23:59  --------------------------------------------------------  IN: 779 mL / OUT: 600 mL / NET: 179 mL          ## P/E:  Gen: young female, lying comfortably in bed in no apparent distress, arouses to name call  HEENT: PERRL, NGT in place  Resp: CTA B/L , no wheeze, no rales, no rhonchi  CVS: RRR  Abd: soft NT/ND +BS  Ext: no c/c/e  Neuro: arousable, moving all extremities, follows commands, nods to yes/no questions     CENTRAL LINE: [ ] YES [x ] NO  LOCATION:   DATE INSERTED:  REMOVE: [ ] YES [ ] NO      LOPEZ: [ ] YES [ ] NO    DATE INSERTED:  REMOVE:  [ ] YES [ ] NO      A-LINE:  [ ] YES [x ] NO  LOCATION:   DATE INSERTED:  REMOVE:  [ ] YES [ ] NO  EXPLAIN:    GLOBAL ISSUE/BEST PRACTICE:  Analgesia: n/a  Sedation: propofol d/tone, precedex  HOB elevation: yes  Stress ulcer prophylaxis: protonix  VTE prophylaxis: on apixaban for DVT  Oral Care: n/a  Glycemic control: n/a  Nutrition: tube feeds, PO diet when tolerates    CODE STATUS: [x ] full code  [ ] DNR  [ ] DNI  [ ] MOLST  Goals of care discussion: [ ] yes

## 2022-05-23 NOTE — H&P ADULT - ENMT
Problem: Pressure Injury, Risk for  Goal: # Skin remains intact  Outcome: Outcome Met, Continue evaluating goal progress toward completion     Problem: At Risk for Falls  Goal: # Patient does not fall  Outcome: Outcome Met, Continue evaluating goal progress toward completion     Problem: At Risk for Falls  Goal: # Takes action to control fall-related risks  Outcome: Outcome Met, Continue evaluating goal progress toward completion     Problem: Activity Intolerance  Goal: # Functional status is maintained or returned to baseline  Outcome: Outcome Not Met, Continue to Monitor     Problem: Activity Intolerance  Goal: # Tolerates activity for d/c setting with no clinical problems  Outcome: Outcome Not Met, Continue to Monitor     Problem: Impaired Physical Mobility  Goal: # Bed mobility, ambulation, and ADLs are maintained or returned to baseline during hospitalization  Outcome: Outcome Not Met, Continue to Monitor    Patient can be impulsive at times, bed alarm is on and call light within reach. Patient is independent in turning and positioning.    No oral lesions; no gross abnormalities

## 2022-05-23 NOTE — H&P ADULT - ASSESSMENT
35 YO female with PMH asthma, HTN, factor V Leiden deficiency, DVT/PE on xarelto, endometriosis, hypothyroid, seizure disorder, schizoaffective vs. bipolar disorder, personality disorder, intellectual disability, hx of cocaine use, prior suicide attempts, multiple past psych hospitalizations, aggressive behavior, presents from group home initially for abdominal pain, workup negative in ED, but then noted to have seizure not responsive to ativan, so was intubated.    NEURO: propofol and precedex for RASS 0 to -1 - pt with likely pseudoseizure at this time given the above history - will continue propofol for now, continue depakote - monitor for any further seizure activity   PSYCH: hold antipsychotics for now while intubated - restart as necessary - will need psych consult when extubated  CV: hemodynamically stable  RESP: intubated in ED, ABG acceptable - continue current vent settings - wean as tolerated  GI: NPO for now  RENAL: no acute issues  PPX: on eliquis  GOC: full  DISPO: ICU 35 YO female with PMH asthma, HTN, factor V Leiden deficiency, DVT/PE on xarelto, endometriosis, hypothyroid, seizure disorder, schizoaffective vs. bipolar disorder, personality disorder, intellectual disability, hx of cocaine use, prior suicide attempts, multiple past psych hospitalizations, aggressive behavior, presents from group home initially for abdominal pain, workup negative in ED, but then noted to have seizure not responsive to ativan, so was intubated.    NEURO: propofol and precedex for RASS 0 to -1 - pt with likely pseudoseizure at this time given the above history - will continue propofol for now, continue depakote - monitor for any further seizure activity   PSYCH: hold antipsychotics for now while intubated and sedated - restart as necessary - will need psych consult when extubated  CV: hemodynamically stable  RESP: intubated in ED, ABG acceptable - continue current vent settings - wean as tolerated  GI: NPO for now  RENAL: no acute issues  PPX: on eliquis  GOC: full  DISPO: ICU    DW EICU Sc[facility].

## 2022-05-23 NOTE — H&P ADULT - HISTORY OF PRESENT ILLNESS
37 YO female with PMH asthma, HTN, factor V Leiden deficiency, hypothyroid, seizure disorder, schizoaffective vs. bipolar disorder, personality disorder, intellectual disability, hx of cocaine use, prior suicide attempts, multiple past psych hospitalizations, aggressive behavior, presents from group home for abdominal pain and nausea.  In the ED, pt had blood work and CT A/P, all negative - she was to be discharged back to group home when she was found "slumped" in the hallway and shaking, presumably a seizure. She was given 2mg ativan and keppra, still seizing - given 2mg more of ativan and magnesium. Pt was noted to still be shaking so was intubated in ED.  Of note, it is documented that pt frequently  35 YO female with PMH asthma, HTN, factor V Leiden deficiency, DVT/PE on xarelto, endometriosis, hypothyroid, seizure disorder, schizoaffective vs. bipolar disorder, personality disorder, intellectual disability, hx of cocaine use, prior suicide attempts, multiple past psych hospitalizations, aggressive behavior, presents from group home for abdominal pain and nausea.  In the ED, pt had blood work and CT A/P, all negative - she was to be discharged back to group home when she was found "slumped" in the hallway and shaking, presumably a seizure. She was given 2mg ativan and keppra, still seizing - given 2mg more of ativan and magnesium. Pt was noted to still be shaking so was intubated in ED.  Of note, it is documented that pt frequently has intentional acting out and sometimes with pseudoseizures when she does not want to return to her group home.    Admit to ICU for respiratory failure requiring intubation, r/o seizure.

## 2022-05-23 NOTE — EEG REPORT - NS EEG TEXT BOX
*** [facility] ***   COMPREHENSIVE EPILEPSY CENTER   REPORT OF ROUTINE VIDEO EEG     NS: [address], [place] [address], Ph#: [phone]  [facility]: [address], [place] [address], Ph#: [phone]  [facility]: [address] [place] [address], Ph#: [phone]    Patient Name: YODIT MORROW  Age and : 36y ([birth year])  MRN #: [id number]  Location: [facility] ICU 7  Referring Physician: Mio Lowe    Study Date: 22    _____________________________________________________________  TECHNICAL INFORMATION    Placement and Labeling of Electrodes:  The EEG was performed utilizing 20 channels referential EEG connections (coronal over temporal over parasagittal montage) using all standard 10-20 electrode placements with EKG.  Recording was at a sampling rate of 256 samples per second per channel.  Time synchronized digital video recording was done simultaneously with EEG recording.  A low light infrared camera was used for low light recording.  Peter and seizure detection algorithms were utilized.    _____________________________________________________________  HISTORY    Patient is a 36y old  Female who presents with a chief complaint of seizure activity requiring intubation (23 May 2022 02:38)      PERTINENT MEDICATION:  propofol Infusion 20 MICROgram(s)/kG/Min (13.5 mL/Hr) IV Continuous <Continuous>  valproate sodium  IVPB 500 milliGRAM(s) IV Intermittent daily  valproate sodium  IVPB 1000 milliGRAM(s) IV Intermittent at bedtime    _____________________________________________________________  STUDY INTERPRETATION    Findings: The background was continuous and reactive. No PDR was seen.    Background Slowing:  Continuous diffuse irregular theta/delta activity.    Focal Slowing:   None were present.    Sleep Background:  Drowsiness and stage II sleep transients were not recorded.    Other Non-Epileptiform Findings:  Diffuse excess beta activity.    Interictal Epileptiform Activity:   None were present.    Events:  Clinical events: None recorded.  Seizures: None recorded.    Activation Procedures:   Hyperventilation was not performed.    Photic stimulation was performed and did not elicit any abnormality.     Artifacts:  Intermittent myogenic and movement artifacts were noted.    ECG:  The heart rate on single channel ECG was predominantly between 60-80 BPM.    _____________________________________________________________  **EEG SUMMARY/CLASSIFICATION**    Abnormal EEG in a sedated patient.     - Background slowing, generalized.  - Diffuse excess beta activity.  __________________________________________________________  **EEG IMPRESSION/CLINICAL CORRELATE**    Abnormal EEG study.    - Moderate nonspecific diffuse or multifocal cerebral dysfunction -- which may be due to sedation.  - Excess diffuse beta activity may be seen with benzodiazepines or sedative medications use.  - No epileptiform patterns or seizures recorded.  _____________________________________________________________    Harrison Yap MD, VJ  Fellow | [facility] [place] *** [facility] ***   COMPREHENSIVE EPILEPSY CENTER   REPORT OF ROUTINE VIDEO EEG     [facility]: [address], [place] [address], Ph#: [phone]  [facility]: [address], [place] [address], Ph#: [phone]  [facility]: [address] [place] [address], Ph#: [phone]    Patient Name: YODIT MORROW  Age and : 36y (89)  MRN #: [id number]  Location: [facility] ICU 7  Referring Physician: Mio Lowe    Study Date: 22    _____________________________________________________________  TECHNICAL INFORMATION    Placement and Labeling of Electrodes:  The EEG was performed utilizing 20 channels referential EEG connections (coronal over temporal over parasagittal montage) using all standard 10-20 electrode placements with EKG.  Recording was at a sampling rate of 256 samples per second per channel.  Time synchronized digital video recording was done simultaneously with EEG recording.  A low light infrared camera was used for low light recording.  Peter and seizure detection algorithms were utilized.    _____________________________________________________________  HISTORY    Patient is a 36y old  Female who presents with a chief complaint of seizure activity requiring intubation (23 May 2022 02:38)      PERTINENT MEDICATION:  propofol Infusion 20 MICROgram(s)/kG/Min (13.5 mL/Hr) IV Continuous <Continuous>  valproate sodium  IVPB 500 milliGRAM(s) IV Intermittent daily  valproate sodium  IVPB 1000 milliGRAM(s) IV Intermittent at bedtime    _____________________________________________________________  STUDY INTERPRETATION    Findings: The background was continuous and reactive. No PDR was seen.    Background Slowing:  Continuous diffuse irregular theta/delta activity.    Focal Slowing:   None were present.    Sleep Background:  Drowsiness and stage II sleep transients were not recorded.    Other Non-Epileptiform Findings:  Diffuse excess beta activity.    Interictal Epileptiform Activity:   None were present.    Events:  Clinical events: None recorded.  Seizures: None recorded.    Activation Procedures:   Hyperventilation was not performed.    Photic stimulation was performed and did not elicit any abnormality.     Artifacts:  Intermittent myogenic and movement artifacts were noted.    ECG:  The heart rate on single channel ECG was predominantly between 60-80 BPM.    _____________________________________________________________  **EEG SUMMARY/CLASSIFICATION**    Abnormal EEG in a sedated patient.  - Background slowing, generalized.  - Diffuse excess beta activity.  __________________________________________________________  **EEG IMPRESSION/CLINICAL CORRELATE**    Abnormal EEG study.    - Mild to Moderate nonspecific diffuse or multifocal cerebral dysfunction -- which may be due to sedation.  - Excess diffuse beta activity may be seen with benzodiazepines or sedative medications use.  - No epileptiform patterns or seizures recorded.  _____________________________________________________________    Harrison Yap MD, VJ  Fellow | [facility]    Dale Pabon MD PhD  Director, Epilepsy Division, [facility]

## 2022-05-23 NOTE — AIRWAY REMOVAL NOTE  ADULT & PEDS - ARTIFICAL AIRWAY REMOVAL COMMENTS
extubated as per order without any complications, syeda well, able to lift head up, speak,alert & awake,placed on oxygen as per order

## 2022-05-23 NOTE — PROVIDER CONTACT NOTE (EICU) - SITUATION
eLerted by bedside team. Request by bedside team for CXR for confirmation of NGT placement.  Orders placed and imaging/results to be followed up by primary team.

## 2022-05-24 LAB
ALBUMIN SERPL ELPH-MCNC: 2.8 G/DL — LOW (ref 3.3–5)
ALP SERPL-CCNC: 45 U/L — SIGNIFICANT CHANGE UP (ref 40–120)
ALT FLD-CCNC: 66 U/L — SIGNIFICANT CHANGE UP (ref 12–78)
ANION GAP SERPL CALC-SCNC: 8 MMOL/L — SIGNIFICANT CHANGE UP (ref 5–17)
AST SERPL-CCNC: 29 U/L — SIGNIFICANT CHANGE UP (ref 15–37)
BILIRUB SERPL-MCNC: 0.2 MG/DL — SIGNIFICANT CHANGE UP (ref 0.2–1.2)
BUN SERPL-MCNC: 13 MG/DL — SIGNIFICANT CHANGE UP (ref 7–23)
CALCIUM SERPL-MCNC: 8.5 MG/DL — SIGNIFICANT CHANGE UP (ref 8.5–10.1)
CHLORIDE SERPL-SCNC: 111 MMOL/L — HIGH (ref 96–108)
CO2 SERPL-SCNC: 23 MMOL/L — SIGNIFICANT CHANGE UP (ref 22–31)
CREAT SERPL-MCNC: 0.79 MG/DL — SIGNIFICANT CHANGE UP (ref 0.5–1.3)
CULTURE RESULTS: SIGNIFICANT CHANGE UP
EGFR: 101 ML/MIN/1.73M2 — SIGNIFICANT CHANGE UP
GLUCOSE SERPL-MCNC: 102 MG/DL — HIGH (ref 70–99)
HCT VFR BLD CALC: 34.2 % — LOW (ref 34.5–45)
HGB BLD-MCNC: 11.1 G/DL — LOW (ref 11.5–15.5)
MAGNESIUM SERPL-MCNC: 2.1 MG/DL — SIGNIFICANT CHANGE UP (ref 1.6–2.6)
MCHC RBC-ENTMCNC: 30.2 PG — SIGNIFICANT CHANGE UP (ref 27–34)
MCHC RBC-ENTMCNC: 32.5 G/DL — SIGNIFICANT CHANGE UP (ref 32–36)
MCV RBC AUTO: 92.9 FL — SIGNIFICANT CHANGE UP (ref 80–100)
NRBC # BLD: 0 /100 WBCS — SIGNIFICANT CHANGE UP (ref 0–0)
PHOSPHATE SERPL-MCNC: 2.9 MG/DL — SIGNIFICANT CHANGE UP (ref 2.5–4.5)
PLATELET # BLD AUTO: 136 K/UL — LOW (ref 150–400)
POTASSIUM SERPL-MCNC: 4.2 MMOL/L — SIGNIFICANT CHANGE UP (ref 3.5–5.3)
POTASSIUM SERPL-SCNC: 4.2 MMOL/L — SIGNIFICANT CHANGE UP (ref 3.5–5.3)
PROT SERPL-MCNC: 6.5 GM/DL — SIGNIFICANT CHANGE UP (ref 6–8.3)
RAPID RVP RESULT: SIGNIFICANT CHANGE UP
RBC # BLD: 3.68 M/UL — LOW (ref 3.8–5.2)
RBC # FLD: 15.7 % — HIGH (ref 10.3–14.5)
SARS-COV-2 RNA SPEC QL NAA+PROBE: SIGNIFICANT CHANGE UP
SODIUM SERPL-SCNC: 142 MMOL/L — SIGNIFICANT CHANGE UP (ref 135–145)
SPECIMEN SOURCE: SIGNIFICANT CHANGE UP
WBC # BLD: 8.72 K/UL — SIGNIFICANT CHANGE UP (ref 3.8–10.5)
WBC # FLD AUTO: 8.72 K/UL — SIGNIFICANT CHANGE UP (ref 3.8–10.5)

## 2022-05-24 PROCEDURE — 99232 SBSQ HOSP IP/OBS MODERATE 35: CPT

## 2022-05-24 RX ORDER — DIPHENHYDRAMINE HCL 50 MG
25 CAPSULE ORAL ONCE
Refills: 0 | Status: DISCONTINUED | OUTPATIENT
Start: 2022-05-24 | End: 2022-05-24

## 2022-05-24 RX ORDER — DIVALPROEX SODIUM 500 MG/1
1000 TABLET, DELAYED RELEASE ORAL AT BEDTIME
Refills: 0 | Status: DISCONTINUED | OUTPATIENT
Start: 2022-05-24 | End: 2022-05-26

## 2022-05-24 RX ORDER — DIPHENHYDRAMINE HCL 50 MG
25 CAPSULE ORAL EVERY 4 HOURS
Refills: 0 | Status: DISCONTINUED | OUTPATIENT
Start: 2022-05-24 | End: 2022-05-25

## 2022-05-24 RX ORDER — BENZOCAINE AND MENTHOL 5; 1 G/100ML; G/100ML
1 LIQUID ORAL
Refills: 0 | Status: ACTIVE | OUTPATIENT
Start: 2022-05-24 | End: 2023-04-22

## 2022-05-24 RX ORDER — ACETAMINOPHEN 500 MG
650 TABLET ORAL ONCE
Refills: 0 | Status: COMPLETED | OUTPATIENT
Start: 2022-05-24 | End: 2022-05-24

## 2022-05-24 RX ORDER — DIPHENHYDRAMINE HCL 50 MG
25 CAPSULE ORAL ONCE
Refills: 0 | Status: COMPLETED | OUTPATIENT
Start: 2022-05-24 | End: 2022-05-24

## 2022-05-24 RX ORDER — PANTOPRAZOLE SODIUM 20 MG/1
40 TABLET, DELAYED RELEASE ORAL
Refills: 0 | Status: DISCONTINUED | OUTPATIENT
Start: 2022-05-24 | End: 2022-05-26

## 2022-05-24 RX ORDER — OXYCODONE AND ACETAMINOPHEN 5; 325 MG/1; MG/1
1 TABLET ORAL ONCE
Refills: 0 | Status: DISCONTINUED | OUTPATIENT
Start: 2022-05-24 | End: 2022-05-24

## 2022-05-24 RX ORDER — DIVALPROEX SODIUM 500 MG/1
500 TABLET, DELAYED RELEASE ORAL DAILY
Refills: 0 | Status: DISCONTINUED | OUTPATIENT
Start: 2022-05-24 | End: 2022-05-26

## 2022-05-24 RX ADMIN — QUETIAPINE FUMARATE 200 MILLIGRAM(S): 200 TABLET, FILM COATED ORAL at 20:46

## 2022-05-24 RX ADMIN — DEXMEDETOMIDINE HYDROCHLORIDE IN 0.9% SODIUM CHLORIDE 0.56 MICROGRAM(S)/KG/HR: 4 INJECTION INTRAVENOUS at 01:36

## 2022-05-24 RX ADMIN — Medication 25 MILLIGRAM(S): at 23:37

## 2022-05-24 RX ADMIN — LURASIDONE HYDROCHLORIDE 40 MILLIGRAM(S): 40 TABLET ORAL at 13:05

## 2022-05-24 RX ADMIN — DIVALPROEX SODIUM 1000 MILLIGRAM(S): 500 TABLET, DELAYED RELEASE ORAL at 21:08

## 2022-05-24 RX ADMIN — Medication 0.5 MILLIGRAM(S): at 17:23

## 2022-05-24 RX ADMIN — Medication 0.5 MILLIGRAM(S): at 11:09

## 2022-05-24 RX ADMIN — Medication 650 MILLIGRAM(S): at 13:04

## 2022-05-24 RX ADMIN — DIVALPROEX SODIUM 500 MILLIGRAM(S): 500 TABLET, DELAYED RELEASE ORAL at 15:38

## 2022-05-24 RX ADMIN — Medication 25 MILLIGRAM(S): at 18:55

## 2022-05-24 RX ADMIN — QUETIAPINE FUMARATE 200 MILLIGRAM(S): 200 TABLET, FILM COATED ORAL at 09:41

## 2022-05-24 RX ADMIN — Medication 650 MILLIGRAM(S): at 14:01

## 2022-05-24 RX ADMIN — CHLORHEXIDINE GLUCONATE 1 APPLICATION(S): 213 SOLUTION TOPICAL at 13:13

## 2022-05-24 RX ADMIN — LORATADINE 10 MILLIGRAM(S): 10 TABLET ORAL at 13:05

## 2022-05-24 RX ADMIN — QUETIAPINE FUMARATE 50 MILLIGRAM(S): 200 TABLET, FILM COATED ORAL at 13:04

## 2022-05-24 RX ADMIN — APIXABAN 5 MILLIGRAM(S): 2.5 TABLET, FILM COATED ORAL at 17:53

## 2022-05-24 RX ADMIN — ARIPIPRAZOLE 30 MILLIGRAM(S): 15 TABLET ORAL at 13:05

## 2022-05-24 RX ADMIN — Medication 75 MICROGRAM(S): at 05:24

## 2022-05-24 RX ADMIN — OXYCODONE AND ACETAMINOPHEN 1 TABLET(S): 5; 325 TABLET ORAL at 21:42

## 2022-05-24 RX ADMIN — OXYCODONE AND ACETAMINOPHEN 1 TABLET(S): 5; 325 TABLET ORAL at 20:42

## 2022-05-24 RX ADMIN — APIXABAN 5 MILLIGRAM(S): 2.5 TABLET, FILM COATED ORAL at 05:24

## 2022-05-24 NOTE — PATIENT PROFILE ADULT - FALL HARM RISK - HARM RISK INTERVENTIONS

## 2022-05-24 NOTE — CHART NOTE - NSCHARTNOTEFT_GEN_A_CORE
ICU/CCU Transfer Note    Transfer from: ICU/ CCU  Transfer to:   Accepting physician:  Case discussed with: Dr Chamorro      MICU COURSE:  37 YO female with PMH asthma, HTN, factor V Leiden deficiency, DVT/PE on xarelto, endometriosis, hypothyroid, seizure disorder, schizoaffective vs. bipolar disorder, personality disorder, intellectual disability, hx of cocaine use, prior suicide attempts, multiple past psych hospitalizations, aggressive behavior, presents from group home for abdominal pain and nausea.  In the ED, pt had blood work and CT A/P, all negative - she was to be discharged back to group home when she was found "slumped" in the hallway and shaking, presumably a seizure. She was given 2mg ativan and keppra, still seizing - given 2mg more of ativan and magnesium. Pt was noted to still be shaking so was intubated in ED.  Of note, it is documented that pt frequently has intentional acting out and sometimes with pseudoseizures when she does not want to return to her group home.    Admit to ICU for respiratory failure requiring intubation, r/o seizure.    - EEG reviewed: no seizure activity noted  - pt arouses and attempts to pull at tubes and lines  - follows simple commands while on sedation  - weaned well  - extubated  - cont apixaban for underlying DVTs  - cont with home psych meds  - pt with multiple admissions with seizure like activity and hx of pseudoseizures, she has had hx of having seizure like activity upon discharge back to group home as she frequently does not want to return to her group home      Stable for transfer to medical floor                                   142    |  111    |  13                  Calcium: 8.5   / iCa: x      (05-24 @ 05:03)    ----------------------------<  102       Magnesium: 2.1                              4.2     |  23     |  0.79             Phosphorous: 2.9      TPro  6.5    /  Alb  2.8    /  TBili  0.2    /  DBili  x      /  AST  29     /  ALT  66     /  AlkPhos  45     24 May 2022 05:03 ICU/CCU Transfer Note    Transfer from: ICU/ CCU  Transfer to: Medicine   Accepting physician: Dr Lynch  Case discussed with: Dr Chamorro      MICU COURSE:  37 YO female with PMH asthma, HTN, factor V Leiden deficiency, DVT/PE on xarelto, endometriosis, hypothyroid, seizure disorder, schizoaffective vs. bipolar disorder, personality disorder, intellectual disability, hx of cocaine use, prior suicide attempts, multiple past psych hospitalizations, aggressive behavior, presents from group home for abdominal pain and nausea.  In the ED, pt had blood work and CT A/P, all negative - she was to be discharged back to group home when she was found "slumped" in the hallway and shaking, presumably a seizure. She was given 2mg ativan and keppra, still seizing - given 2mg more of ativan and magnesium. Pt was noted to still be shaking so was intubated in ED.  Of note, it is documented that pt frequently has intentional acting out and sometimes with pseudoseizures when she does not want to return to her group home.    Admit to ICU for respiratory failure requiring intubation, r/o seizure.    - EEG reviewed: no seizure activity noted  - pt arouses and attempts to pull at tubes and lines  - follows simple commands while on sedation  - weaned well  - extubated  - cont apixaban for underlying DVTs  - cont with home psych meds  - pt with multiple admissions with seizure like activity and hx of pseudoseizures, she has had hx of having seizure like activity upon discharge back to group home as she frequently does not want to return to her group home      Stable for transfer to medical floor                                   142    |  111    |  13                  Calcium: 8.5   / iCa: x      (05-24 @ 05:03)    ----------------------------<  102       Magnesium: 2.1                              4.2     |  23     |  0.79             Phosphorous: 2.9      TPro  6.5    /  Alb  2.8    /  TBili  0.2    /  DBili  x      /  AST  29     /  ALT  66     /  AlkPhos  45     24 May 2022 05:03

## 2022-05-24 NOTE — PROGRESS NOTE ADULT - SUBJECTIVE AND OBJECTIVE BOX
# CC: Patient is a 36y old  Female who presents with a chief complaint of seizure activity requiring intubation (23 May 2022 23:08)      ## HPI:  37 YO female with PMH asthma, HTN, factor V Leiden deficiency, DVT/PE on xarelto, endometriosis, hypothyroid, seizure disorder, schizoaffective vs. bipolar disorder, personality disorder, intellectual disability, hx of cocaine use, prior suicide attempts, multiple past psych hospitalizations, aggressive behavior, presents from group home for abdominal pain and nausea.  In the ED, pt had blood work and CT A/P, all negative - she was to be discharged back to group home when she was found "slumped" in the hallway and shaking, presumably a seizure. She was given 2mg ativan and keppra, still seizing - given 2mg more of ativan and magnesium. Pt was noted to still be shaking so was intubated in ED.  Of note, it is documented that pt frequently has intentional acting out and sometimes with pseudoseizures when she does not want to return to her group home.    Admit to ICU for respiratory failure requiring intubation, r/o seizure. (23 May 2022 02:38)      **O/N:**    ## ROS:    ## Labs:  ** CBC: **                        11.1   8.72  )-----------( 136      ( 24 May 2022 05:03 )             34.2     ** Chem:  **  05-24    142  |  111<H>  |  13  ----------------------------<  102<H>  4.2   |  23  |  0.79    Ca    8.5      24 May 2022 05:03  Phos  2.9     05-24  Mg     2.1     05-24    TPro  6.5  /  Alb  2.8<L>  /  TBili  0.2  /  DBili  x   /  AST  29  /  ALT  66  /  AlkPhos  45  05-24    ** Coags: **      CAPILLARY BLOOD GLUCOSE            Culture - Urine (collected 23 May 2022 09:11)  Source: Clean Catch Clean Catch (Midstream)  Final Report (24 May 2022 09:52):    >=3 organisms. Probable collection contamination.        ## Imaging:    ## Medications:      loratadine 10 milliGRAM(s) Oral daily    ARIPiprazole 30 milliGRAM(s) Oral daily  LORazepam     Tablet 0.5 milliGRAM(s) Oral <User Schedule>  lurasidone 40 milliGRAM(s) Oral daily  QUEtiapine 50 milliGRAM(s) Oral <User Schedule>  QUEtiapine 200 milliGRAM(s) Oral <User Schedule>  valproate sodium  IVPB 500 milliGRAM(s) IV Intermittent daily  valproate sodium  IVPB 1000 milliGRAM(s) IV Intermittent at bedtime      apixaban 5 milliGRAM(s) Oral every 12 hours    pantoprazole  Injectable 40 milliGRAM(s) IV Push daily      levothyroxine 75 MICROGram(s) Oral daily          ## O/E:  ICU Vital Signs Last 24 Hrs  T(C): 37.5 (24 May 2022 08:00), Max: 37.5 (24 May 2022 08:00)  T(F): 99.5 (24 May 2022 08:00), Max: 99.5 (24 May 2022 08:00)  HR: 107 (24 May 2022 11:00) (56 - 109)  BP: 103/59 (24 May 2022 11:00) (81/56 - 120/82)  BP(mean): 67 (24 May 2022 11:00) (62 - 92)  ABP: --  ABP(mean): --  RR: 23 (24 May 2022 11:00) (11 - 36)  SpO2: 99% (24 May 2022 11:00) (98% - 100%)    I&O's Summary    23 May 2022 07:01  -  24 May 2022 07:00  --------------------------------------------------------  IN: 971 mL / OUT: 1600 mL / NET: -629 mL    24 May 2022 07:01  -  24 May 2022 12:37  --------------------------------------------------------  IN: 20 mL / OUT: 0 mL / NET: 20 mL      Mode: CPAP with PS, TV (machine): 837, FiO2: 25, PEEP: 5, PS: 5, MAP: 8  Gen: lying comfortably in bed in no apparent distress  HEENT: PERRL, EOMI  Resp: CTA B/L no c/r/w  CVS: S1S2 no m/r/g  Abd: soft NT/ND +BS  Ext: no c/c/e  Neuro: A&Ox3    ## Code status:  Goals of care discussion: [x] yes [ ] no  [x] full code  [ ] DNR  [ ] DNI  [ ] MOLST # CC: Patient is a 36y old  Female who presents with a chief complaint of seizure activity requiring intubation (23 May 2022 23:08)      ## HPI:  35 YO female with PMH asthma, HTN, factor V Leiden deficiency, DVT/PE on xarelto, endometriosis, hypothyroid, seizure disorder, schizoaffective vs. bipolar disorder, personality disorder, intellectual disability, hx of cocaine use, prior suicide attempts, multiple past psych hospitalizations, aggressive behavior, presents from group home for abdominal pain and nausea.  In the ED, pt had blood work and CT A/P, all negative - she was to be discharged back to group home when she was found "slumped" in the hallway and shaking, presumably a seizure. She was given 2mg ativan and keppra, still seizing - given 2mg more of ativan and magnesium. Pt was noted to still be shaking so was intubated in ED.  Of note, it is documented that pt frequently has intentional acting out and sometimes with pseudoseizures when she does not want to return to her group home.    Admit to ICU for respiratory failure requiring intubation, r/o seizure. (23 May 2022 02:38)      **O/N:**  Extubated overnight    ## ROS:  Offers no complaints    ## Labs:  ** CBC: **                        11.1   8.72  )-----------( 136      ( 24 May 2022 05:03 )             34.2     ** Chem:  **  05-24    142  |  111<H>  |  13  ----------------------------<  102<H>  4.2   |  23  |  0.79    Ca    8.5      24 May 2022 05:03  Phos  2.9     05-24  Mg     2.1     05-24    TPro  6.5  /  Alb  2.8<L>  /  TBili  0.2  /  DBili  x   /  AST  29  /  ALT  66  /  AlkPhos  45  05-24    Culture - Urine (collected 23 May 2022 09:11)  Source: Clean Catch Clean Catch (Midstream)  Final Report (24 May 2022 09:52):    >=3 organisms. Probable collection contamination.    ## Medications:  loratadine 10 milliGRAM(s) Oral daily    ARIPiprazole 30 milliGRAM(s) Oral daily  LORazepam     Tablet 0.5 milliGRAM(s) Oral <User Schedule>  lurasidone 40 milliGRAM(s) Oral daily  QUEtiapine 50 milliGRAM(s) Oral <User Schedule>  QUEtiapine 200 milliGRAM(s) Oral <User Schedule>  valproate sodium  IVPB 500 milliGRAM(s) IV Intermittent daily  valproate sodium  IVPB 1000 milliGRAM(s) IV Intermittent at bedtime    apixaban 5 milliGRAM(s) Oral every 12 hours  pantoprazole  Injectable 40 milliGRAM(s) IV Push daily  levothyroxine 75 MICROGram(s) Oral daily    ## O/E:  T(C): 37.5 (24 May 2022 08:00), Max: 37.5 (24 May 2022 08:00)  T(F): 99.5 (24 May 2022 08:00), Max: 99.5 (24 May 2022 08:00)  HR: 107 (24 May 2022 11:00) (56 - 109)  BP: 103/59 (24 May 2022 11:00) (81/56 - 120/82)  BP(mean): 67 (24 May 2022 11:00) (62 - 92)  RR: 23 (24 May 2022 11:00) (11 - 36)  SpO2: 99% (24 May 2022 11:00) (98% - 100%)  IN: 971 mL / OUT: 1600 mL / NET: -629 mL    Gen: lying comfortably in bed in no apparent distress  HEENT: PERRL  Resp: CTA B/L no c/r/w  CVS: S1S2 no m/r/g  Abd: soft NT/ND +BS  Ext: no c/c/e  Neuro: A&Ox3    ## Code status:  Goals of care discussion: [x] yes [ ] no  [x] full code  [ ] DNR  [ ] DNI  [ ] MOLST

## 2022-05-24 NOTE — PROGRESS NOTE ADULT - ASSESSMENT
36F with elevated BMI and PMH Factor 5 Leiden Deficiency, DVT/PE on AC, seizure disorder vs conversion disorder, asthma, endometriosis, extensive psychiatric history (schizoaffective disorder vs bipolar disorder vs other mood/psychotic disorders), personality disorder, intellectual disability, cocaine use disorder with hx of aggression and psych admissions presents to ER from group home with complaints of abdominal pain, nausea, constipation. Denies fever, chills, emesis. Work up in ED negative and pt given notice for discharge plans back to group home. Pt was subsequently noted to be slumped over and started to have seizure like activity for approximately 15 min which did not resolved with ativan 2mg IVP x2 and keppra 1G. Intubated in ED for status epilepticus. Admitted to ICU intubated.    DX: seizure, respiratory failure due to sedation requiring intubation, DVTs, factor V leiden deficiency    - EEG reviewed: no seizure activity noted  - pt arouses and attempts to pull at tubes and lines  - follows simple commands while on sedation  - weaned well  - extubated  - d/c propofol  - wean down precedex as tolerates as pt with hx of aggression and is attempting to pull at lines  - cont apixaban for underlying DVTs  - cont with home psych meds  - pt with multiple admissions with seizure like activity and hx of pseudoseizures, she has had hx of having seizure like activity upon discharge back to group home as she frequently does not want to return to her group home  - will monitor airway in ICU post extubation   - if pt remains calm and off sedation and hemodynamically stable can consider downgrade to medical floor in next 24 hours        
36F asthma Factor V Leiden, DVT/PE on Xarelto schizoaffective vs bipolar D/O extensive psych history with abdominal pain / nausea  - Now extuabted, no evidence of seizure-like activity  - c/w psych medicines  - hemodynamically stable  - apixaban for DVT/PE    Transfer to general medicine

## 2022-05-25 DIAGNOSIS — E03.9 HYPOTHYROIDISM, UNSPECIFIED: ICD-10-CM

## 2022-05-25 DIAGNOSIS — F31.9 BIPOLAR DISORDER, UNSPECIFIED: ICD-10-CM

## 2022-05-25 DIAGNOSIS — D68.51 ACTIVATED PROTEIN C RESISTANCE: ICD-10-CM

## 2022-05-25 DIAGNOSIS — R56.9 UNSPECIFIED CONVULSIONS: ICD-10-CM

## 2022-05-25 DIAGNOSIS — F60.9 PERSONALITY DISORDER, UNSPECIFIED: ICD-10-CM

## 2022-05-25 DIAGNOSIS — J45.909 UNSPECIFIED ASTHMA, UNCOMPLICATED: ICD-10-CM

## 2022-05-25 LAB
ALBUMIN SERPL ELPH-MCNC: 3.3 G/DL — SIGNIFICANT CHANGE UP (ref 3.3–5)
ALP SERPL-CCNC: 60 U/L — SIGNIFICANT CHANGE UP (ref 40–120)
ALT FLD-CCNC: 51 U/L — SIGNIFICANT CHANGE UP (ref 12–78)
ANION GAP SERPL CALC-SCNC: 7 MMOL/L — SIGNIFICANT CHANGE UP (ref 5–17)
AST SERPL-CCNC: 21 U/L — SIGNIFICANT CHANGE UP (ref 15–37)
BILIRUB SERPL-MCNC: 0.3 MG/DL — SIGNIFICANT CHANGE UP (ref 0.2–1.2)
BUN SERPL-MCNC: 9 MG/DL — SIGNIFICANT CHANGE UP (ref 7–23)
CALCIUM SERPL-MCNC: 9.1 MG/DL — SIGNIFICANT CHANGE UP (ref 8.5–10.1)
CHLORIDE SERPL-SCNC: 109 MMOL/L — HIGH (ref 96–108)
CO2 SERPL-SCNC: 24 MMOL/L — SIGNIFICANT CHANGE UP (ref 22–31)
CREAT SERPL-MCNC: 0.83 MG/DL — SIGNIFICANT CHANGE UP (ref 0.5–1.3)
EGFR: 95 ML/MIN/1.73M2 — SIGNIFICANT CHANGE UP
GLUCOSE SERPL-MCNC: 90 MG/DL — SIGNIFICANT CHANGE UP (ref 70–99)
HCT VFR BLD CALC: 37.2 % — SIGNIFICANT CHANGE UP (ref 34.5–45)
HGB BLD-MCNC: 12.1 G/DL — SIGNIFICANT CHANGE UP (ref 11.5–15.5)
MAGNESIUM SERPL-MCNC: 2.2 MG/DL — SIGNIFICANT CHANGE UP (ref 1.6–2.6)
MCHC RBC-ENTMCNC: 30.1 PG — SIGNIFICANT CHANGE UP (ref 27–34)
MCHC RBC-ENTMCNC: 32.5 G/DL — SIGNIFICANT CHANGE UP (ref 32–36)
MCV RBC AUTO: 92.5 FL — SIGNIFICANT CHANGE UP (ref 80–100)
NRBC # BLD: 0 /100 WBCS — SIGNIFICANT CHANGE UP (ref 0–0)
PHOSPHATE SERPL-MCNC: 3.4 MG/DL — SIGNIFICANT CHANGE UP (ref 2.5–4.5)
PLATELET # BLD AUTO: 107 K/UL — LOW (ref 150–400)
POTASSIUM SERPL-MCNC: 3.6 MMOL/L — SIGNIFICANT CHANGE UP (ref 3.5–5.3)
POTASSIUM SERPL-SCNC: 3.6 MMOL/L — SIGNIFICANT CHANGE UP (ref 3.5–5.3)
PROT SERPL-MCNC: 7.8 GM/DL — SIGNIFICANT CHANGE UP (ref 6–8.3)
RBC # BLD: 4.02 M/UL — SIGNIFICANT CHANGE UP (ref 3.8–5.2)
RBC # FLD: 15.8 % — HIGH (ref 10.3–14.5)
SODIUM SERPL-SCNC: 140 MMOL/L — SIGNIFICANT CHANGE UP (ref 135–145)
WBC # BLD: 8.63 K/UL — SIGNIFICANT CHANGE UP (ref 3.8–10.5)
WBC # FLD AUTO: 8.63 K/UL — SIGNIFICANT CHANGE UP (ref 3.8–10.5)

## 2022-05-25 PROCEDURE — 93010 ELECTROCARDIOGRAM REPORT: CPT

## 2022-05-25 PROCEDURE — 99233 SBSQ HOSP IP/OBS HIGH 50: CPT

## 2022-05-25 PROCEDURE — 90792 PSYCH DIAG EVAL W/MED SRVCS: CPT

## 2022-05-25 RX ORDER — DIPHENHYDRAMINE HCL 50 MG
50 CAPSULE ORAL EVERY 6 HOURS
Refills: 0 | Status: DISCONTINUED | OUTPATIENT
Start: 2022-05-25 | End: 2022-05-26

## 2022-05-25 RX ORDER — OXYCODONE AND ACETAMINOPHEN 5; 325 MG/1; MG/1
1 TABLET ORAL EVERY 6 HOURS
Refills: 0 | Status: DISCONTINUED | OUTPATIENT
Start: 2022-05-25 | End: 2022-05-26

## 2022-05-25 RX ORDER — DIPHENHYDRAMINE HCL 50 MG
25 CAPSULE ORAL ONCE
Refills: 0 | Status: COMPLETED | OUTPATIENT
Start: 2022-05-25 | End: 2022-05-25

## 2022-05-25 RX ORDER — ALBUTEROL 90 UG/1
2 AEROSOL, METERED ORAL EVERY 6 HOURS
Refills: 0 | Status: DISCONTINUED | OUTPATIENT
Start: 2022-05-25 | End: 2022-05-26

## 2022-05-25 RX ORDER — HALOPERIDOL DECANOATE 100 MG/ML
5 INJECTION INTRAMUSCULAR ONCE
Refills: 0 | Status: COMPLETED | OUTPATIENT
Start: 2022-05-25 | End: 2022-05-25

## 2022-05-25 RX ORDER — OXYCODONE AND ACETAMINOPHEN 5; 325 MG/1; MG/1
2 TABLET ORAL ONCE
Refills: 0 | Status: DISCONTINUED | OUTPATIENT
Start: 2022-05-25 | End: 2022-05-25

## 2022-05-25 RX ORDER — CHLORPROMAZINE HCL 10 MG
25 TABLET ORAL ONCE
Refills: 0 | Status: COMPLETED | OUTPATIENT
Start: 2022-05-25 | End: 2022-05-25

## 2022-05-25 RX ORDER — DIPHENHYDRAMINE HCL 50 MG
50 CAPSULE ORAL ONCE
Refills: 0 | Status: COMPLETED | OUTPATIENT
Start: 2022-05-25 | End: 2022-05-25

## 2022-05-25 RX ORDER — CHLORPROMAZINE HCL 10 MG
50 TABLET ORAL EVERY 6 HOURS
Refills: 0 | Status: DISCONTINUED | OUTPATIENT
Start: 2022-05-25 | End: 2022-05-26

## 2022-05-25 RX ORDER — OXYCODONE AND ACETAMINOPHEN 5; 325 MG/1; MG/1
1 TABLET ORAL ONCE
Refills: 0 | Status: DISCONTINUED | OUTPATIENT
Start: 2022-05-25 | End: 2022-05-25

## 2022-05-25 RX ORDER — LANOLIN ALCOHOL/MO/W.PET/CERES
3 CREAM (GRAM) TOPICAL AT BEDTIME
Refills: 0 | Status: DISCONTINUED | OUTPATIENT
Start: 2022-05-25 | End: 2022-05-26

## 2022-05-25 RX ADMIN — Medication 25 MILLIGRAM(S): at 01:52

## 2022-05-25 RX ADMIN — Medication 2 MILLIGRAM(S): at 05:25

## 2022-05-25 RX ADMIN — OXYCODONE AND ACETAMINOPHEN 1 TABLET(S): 5; 325 TABLET ORAL at 19:48

## 2022-05-25 RX ADMIN — Medication 3 MILLIGRAM(S): at 01:02

## 2022-05-25 RX ADMIN — Medication 3 MILLIGRAM(S): at 21:17

## 2022-05-25 RX ADMIN — OXYCODONE AND ACETAMINOPHEN 1 TABLET(S): 5; 325 TABLET ORAL at 07:50

## 2022-05-25 RX ADMIN — PANTOPRAZOLE SODIUM 40 MILLIGRAM(S): 20 TABLET, DELAYED RELEASE ORAL at 07:50

## 2022-05-25 RX ADMIN — QUETIAPINE FUMARATE 200 MILLIGRAM(S): 200 TABLET, FILM COATED ORAL at 07:50

## 2022-05-25 RX ADMIN — QUETIAPINE FUMARATE 50 MILLIGRAM(S): 200 TABLET, FILM COATED ORAL at 12:14

## 2022-05-25 RX ADMIN — APIXABAN 5 MILLIGRAM(S): 2.5 TABLET, FILM COATED ORAL at 05:22

## 2022-05-25 RX ADMIN — Medication 75 MICROGRAM(S): at 05:22

## 2022-05-25 RX ADMIN — DIVALPROEX SODIUM 500 MILLIGRAM(S): 500 TABLET, DELAYED RELEASE ORAL at 12:15

## 2022-05-25 RX ADMIN — ALBUTEROL 2 PUFF(S): 90 AEROSOL, METERED ORAL at 15:08

## 2022-05-25 RX ADMIN — HALOPERIDOL DECANOATE 5 MILLIGRAM(S): 100 INJECTION INTRAMUSCULAR at 05:25

## 2022-05-25 RX ADMIN — Medication 0.5 MILLIGRAM(S): at 10:40

## 2022-05-25 RX ADMIN — Medication 1 MILLIGRAM(S): at 01:00

## 2022-05-25 RX ADMIN — LURASIDONE HYDROCHLORIDE 40 MILLIGRAM(S): 40 TABLET ORAL at 12:14

## 2022-05-25 RX ADMIN — Medication 50 MILLIGRAM(S): at 21:17

## 2022-05-25 RX ADMIN — ARIPIPRAZOLE 30 MILLIGRAM(S): 15 TABLET ORAL at 12:15

## 2022-05-25 RX ADMIN — BENZOCAINE AND MENTHOL 1 LOZENGE: 5; 1 LIQUID ORAL at 15:12

## 2022-05-25 RX ADMIN — OXYCODONE AND ACETAMINOPHEN 1 TABLET(S): 5; 325 TABLET ORAL at 08:34

## 2022-05-25 RX ADMIN — BENZOCAINE AND MENTHOL 1 LOZENGE: 5; 1 LIQUID ORAL at 08:40

## 2022-05-25 RX ADMIN — APIXABAN 5 MILLIGRAM(S): 2.5 TABLET, FILM COATED ORAL at 17:38

## 2022-05-25 RX ADMIN — Medication 2 MILLIGRAM(S): at 14:14

## 2022-05-25 RX ADMIN — Medication 50 MILLIGRAM(S): at 16:23

## 2022-05-25 RX ADMIN — OXYCODONE AND ACETAMINOPHEN 1 TABLET(S): 5; 325 TABLET ORAL at 17:38

## 2022-05-25 RX ADMIN — LORATADINE 10 MILLIGRAM(S): 10 TABLET ORAL at 15:00

## 2022-05-25 RX ADMIN — DIVALPROEX SODIUM 1000 MILLIGRAM(S): 500 TABLET, DELAYED RELEASE ORAL at 21:15

## 2022-05-25 RX ADMIN — Medication 25 MILLIGRAM(S): at 15:08

## 2022-05-25 RX ADMIN — HALOPERIDOL DECANOATE 5 MILLIGRAM(S): 100 INJECTION INTRAMUSCULAR at 04:06

## 2022-05-25 RX ADMIN — Medication 0.5 MILLIGRAM(S): at 16:20

## 2022-05-25 RX ADMIN — QUETIAPINE FUMARATE 200 MILLIGRAM(S): 200 TABLET, FILM COATED ORAL at 19:04

## 2022-05-25 RX ADMIN — Medication 1 APPLICATION(S): at 17:39

## 2022-05-25 RX ADMIN — OXYCODONE AND ACETAMINOPHEN 2 TABLET(S): 5; 325 TABLET ORAL at 14:48

## 2022-05-25 RX ADMIN — Medication 25 MILLIGRAM(S): at 16:20

## 2022-05-25 RX ADMIN — Medication 25 MILLIGRAM(S): at 09:09

## 2022-05-25 RX ADMIN — OXYCODONE AND ACETAMINOPHEN 1 TABLET(S): 5; 325 TABLET ORAL at 23:29

## 2022-05-25 RX ADMIN — OXYCODONE AND ACETAMINOPHEN 2 TABLET(S): 5; 325 TABLET ORAL at 14:13

## 2022-05-25 NOTE — PROGRESS NOTE ADULT - SUBJECTIVE AND OBJECTIVE BOX
# CC: Patient is a 36y old  Female who presents with a chief complaint of seizure activity requiring intubation (23 May 2022 23:08)      ## HPI:  37 YO female with PMH asthma, HTN, factor V Leiden deficiency, DVT/PE on xarelto, endometriosis, hypothyroid, seizure disorder, schizoaffective vs. bipolar disorder, personality disorder, intellectual disability, hx of cocaine use, prior suicide attempts, multiple past psych hospitalizations, aggressive behavior, presents from group home for abdominal pain and nausea.  In the ED, pt had blood work and CT A/P, all negative - she was to be discharged back to group home when she was found "slumped" in the hallway and shaking, presumably a seizure. She was given 2mg ativan and keppra, still seizing - given 2mg more of ativan and magnesium. Pt was noted to still be shaking so was intubated in ED.  Of note, it is documented that pt frequently has intentional acting out and sometimes with pseudoseizures when she does not want to return to her group home.    Admit to ICU for respiratory failure requiring intubation, r/o seizure. (23 May 2022 02:38)        OVERNIHGHT   code gray    caressive foul language physically tried to fight staff and verbally threatened others  MEDICATIONS  (STANDING):  apixaban 5 milliGRAM(s) Oral every 12 hours  ARIPiprazole 30 milliGRAM(s) Oral daily  chlorhexidine 2% Cloths 1 Application(s) Topical <User Schedule>  diVALproex  milliGRAM(s) Oral daily  diVALproex ER 1000 milliGRAM(s) Oral at bedtime  levothyroxine 75 MICROGram(s) Oral daily  loratadine 10 milliGRAM(s) Oral daily  LORazepam     Tablet 0.5 milliGRAM(s) Oral <User Schedule>  lurasidone 40 milliGRAM(s) Oral daily  melatonin 3 milliGRAM(s) Oral at bedtime  pantoprazole    Tablet 40 milliGRAM(s) Oral before breakfast  QUEtiapine 50 milliGRAM(s) Oral <User Schedule>  QUEtiapine 200 milliGRAM(s) Oral <User Schedule>    MEDICATIONS  (PRN):  benzocaine 15 mG/menthol 3.6 mG Lozenge 1 Lozenge Oral every 2 hours PRN Sore Throat  chlorproMAZINE    Tablet 50 milliGRAM(s) Oral every 6 hours PRN agitation  diphenhydrAMINE 25 milliGRAM(s) Oral every 4 hours PRN Rash and/or Itching    Allergies    latex (Blisters)  morphine (Unknown)  penicillin (Hives)  penicillin (Other)  pineapple (Unknown)  Toradol (Rash)  Toradol (Unknown)  Zofran (Hives)  Zofran (Unknown)    Intolerances        Vital Signs Last 24 Hrs  T(C): 36.4 (25 May 2022 11:02), Max: 36.9 (24 May 2022 15:19)  T(F): 97.6 (25 May 2022 11:02), Max: 98.4 (24 May 2022 15:19)  HR: 111 (25 May 2022 11:02) (99 - 115)  BP: 115/81 (25 May 2022 11:02) (86/65 - 140/82)  BP(mean): 69 (24 May 2022 17:00) (66 - 73)  RR: 18 (25 May 2022 11:02) (15 - 24)  SpO2: 100% (25 May 2022 11:02) (97% - 100%)    Gen: lying comfortably in bed in no apparent distress  HEENT: PERRL, EOMI  Resp: CTA B/L no c/r/w  CVS: S1S2 no m/r/g  Abd: soft NT/ND +BS  Ext: no c/c/e  Neuro: A&Ox3    ## Code status: (per ICU attending documentations)  Goals of care discussion: [x] yes [ ] no  [x] full code  [ ] DNR  [ ] DNI  [ ] MOLST # CC: Patient is a 36y old  Female who presents with a chief complaint of seizure activity requiring intubation (23 May 2022 23:08)      ## HPI:  37 YO female with PMH asthma, HTN, factor V Leiden deficiency, DVT/PE on xarelto, endometriosis, hypothyroid, seizure disorder, schizoaffective vs. bipolar disorder, personality disorder, intellectual disability, hx of cocaine use, prior suicide attempts, multiple past psych hospitalizations, aggressive behavior, presents from group home for abdominal pain and nausea.  In the ED, pt had blood work and CT A/P, all negative - she was to be discharged back to group home when she was found "slumped" in the hallway and shaking, presumably a seizure. She was given 2mg ativan and keppra, still seizing - given 2mg more of ativan and magnesium. Pt was noted to still be shaking so was intubated in ED.  Of note, it is documented that pt frequently has intentional acting out and sometimes with pseudoseizures when she does not want to return to her group home.    Admit to ICU for respiratory failure requiring intubation, r/o seizure. (23 May 2022 02:38)        [facility]   [facility]   agressive foul language physically tried to fight staff and verbally threatened others    Today  had code gray and code flight but now pleasant     MEDICATIONS  (STANDING):  apixaban 5 milliGRAM(s) Oral every 12 hours  ARIPiprazole 30 milliGRAM(s) Oral daily  chlorhexidine 2% Cloths 1 Application(s) Topical <User Schedule>  diVALproex  milliGRAM(s) Oral daily  diVALproex ER 1000 milliGRAM(s) Oral at bedtime  levothyroxine 75 MICROGram(s) Oral daily  loratadine 10 milliGRAM(s) Oral daily  LORazepam     Tablet 0.5 milliGRAM(s) Oral <User Schedule>  lurasidone 40 milliGRAM(s) Oral daily  melatonin 3 milliGRAM(s) Oral at bedtime  pantoprazole    Tablet 40 milliGRAM(s) Oral before breakfast  QUEtiapine 50 milliGRAM(s) Oral <User Schedule>  QUEtiapine 200 milliGRAM(s) Oral <User Schedule>    MEDICATIONS  (PRN):  benzocaine 15 mG/menthol 3.6 mG Lozenge 1 Lozenge Oral every 2 hours PRN Sore Throat  chlorproMAZINE    Tablet 50 milliGRAM(s) Oral every 6 hours PRN agitation  diphenhydrAMINE 25 milliGRAM(s) Oral every 4 hours PRN Rash and/or Itching    Allergies    latex (Blisters)  morphine (Unknown)  penicillin (Hives)  penicillin (Other)  pineapple (Unknown)  Toradol (Rash)  Toradol (Unknown)  Zofran (Hives)  Zofran (Unknown)    Intolerances      c/o pruritis on back and arms    Vital Signs Last 24 Hrs  T(C): 36.4 (25 May 2022 11:02), Max: 36.9 (24 May 2022 15:19)  T(F): 97.6 (25 May 2022 11:02), Max: 98.4 (24 May 2022 15:19)  HR: 111 (25 May 2022 11:02) (99 - 115)  BP: 115/81 (25 May 2022 11:02) (86/65 - 140/82)  BP(mean): 69 (24 May 2022 17:00) (66 - 73)  RR: 18 (25 May 2022 11:02) (15 - 24)  SpO2: 100% (25 May 2022 11:02) (97% - 100%)    Gen: lying comfortably in bed ic/o itching in nad   HEENT: PERRL, EOMI  Resp: CTA B/L no c/r/w  CVS: S1S2 no m/r/g  Abd: soft NT/ND +BS  Ext: no c/c/e  Neuro: A&Ox3   MOOD: pleasant at the moment    ## Code status: (per ICU attending documentations)  Goals of care discussion: [x] yes [ ] no  [x] full code  [ ] DNR  [ ] DNI  [ ] MOLST

## 2022-05-25 NOTE — CHART NOTE - NSCHARTNOTEFT_GEN_A_CORE
11-7 nite ( 5/24 - 5/25 )   Multiple code greys and code flights on this pt overnight . Pt with many requests for different medications overnight . Pt is on constant observation , however , gets up and leaves the room when agitated . Dr Lynch at bedside for events , pt recieved benadryl PO, Benadryl IV, ativan , Haldol and thorazine overnight . this am pt awake , alert in bed still agitated . constant observation in progress. VSS . No distress noted

## 2022-05-25 NOTE — BH CONSULTATION LIAISON ASSESSMENT NOTE - OTHER
chronically low end of fair to limited  peers  has been better than usual  concrete  "it hurts!" tearful with dramatic flair  + reactive  adequate during exam

## 2022-05-25 NOTE — BH CONSULTATION LIAISON ASSESSMENT NOTE - NSBHCONSULTRECOMMENDOTHER_PSY_A_CORE FT
continue Seroquel 200mg @ 8am, 50mg @ 12pm, 200mg @ 8pm; Abilify 30mg daily, lurasidone 40mg po daily  depakote 500mg po in am and 1000 PO qhs, Lorazepam PO 0.5mg bid, thorazine 50mg q6h PRN,  Tizanidine 2mg bid PRN, Levothyroxine 75mg daily, Pantoprazole 40 mg AM  - thorazine 50mg IM q4hrs PRN for agitation with Ativan 2mg IM q4hrs PRN  - Patient has a legal guardian Maria Elena Brown [phone]; Patient CANNOT refuse transfer back to [facility] home when medically stable   continue Seroquel 200mg @ 8am, 50mg @ 12pm, 200mg @ 8pm; Abilify 30mg daily, lurasidone 40mg po daily  depakote 500mg po in am and 1000 PO qhs, Lorazepam PO 0.5mg bid, thorazine 50mg q6h PRN,  Tizanidine 2mg bid PRN, Levothyroxine 75mg daily, Pantoprazole 40 mg AM  - thorazine 50mg IM q4hrs PRN for agitation with Ativan 2mg IM q4hrs PRN  - ordered Percocet for Patient; may very well have throat discomfort s/p intubation   - Patient has a legal guardian Maria Elena Kevin [phone]; Patient CANNOT refuse transfer back to group home when medically stable

## 2022-05-25 NOTE — PROGRESS NOTE ADULT - PROBLEM SELECTOR PLAN 5
continue with meds   will get psych evaluation especially in light of overnight events   physically trying to fight others, being aggressive and  using foul  language   leaving the unit   code gray

## 2022-05-25 NOTE — BH CONSULTATION LIAISON ASSESSMENT NOTE - CURRENT MEDICATION
MEDICATIONS  (STANDING):  apixaban 5 milliGRAM(s) Oral every 12 hours  ARIPiprazole 30 milliGRAM(s) Oral daily  chlorhexidine 2% Cloths 1 Application(s) Topical <User Schedule>  diVALproex  milliGRAM(s) Oral daily  diVALproex ER 1000 milliGRAM(s) Oral at bedtime  levothyroxine 75 MICROGram(s) Oral daily  loratadine 10 milliGRAM(s) Oral daily  LORazepam     Tablet 0.5 milliGRAM(s) Oral <User Schedule>  lurasidone 40 milliGRAM(s) Oral daily  melatonin 3 milliGRAM(s) Oral at bedtime  pantoprazole    Tablet 40 milliGRAM(s) Oral before breakfast  QUEtiapine 50 milliGRAM(s) Oral <User Schedule>  QUEtiapine 200 milliGRAM(s) Oral <User Schedule>    MEDICATIONS  (PRN):  ALBUTerol    90 MICROgram(s) HFA Inhaler 2 Puff(s) Inhalation every 6 hours PRN Shortness of Breath and/or Wheezing  benzocaine 15 mG/menthol 3.6 mG Lozenge 1 Lozenge Oral every 2 hours PRN Sore Throat  chlorproMAZINE    Tablet 50 milliGRAM(s) Oral every 6 hours PRN agitation  diphenhydrAMINE 25 milliGRAM(s) Oral every 4 hours PRN Rash and/or Itching

## 2022-05-25 NOTE — BH CONSULTATION LIAISON ASSESSMENT NOTE - SUMMARY
History involving aggressive, intentional behavior which is Pt's well known baseline and is NOT secondary to a primary psychotic or mood process but a manifestation of Patient's Axis II pathologies is likely to nonetheless occur. Patient has learned behavior of using her size to physically intimidate others and get what she wants, be it certain medications, food items or sabotage return to group home.
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
## 2022-05-25 NOTE — PROGRESS NOTE ADULT - PROBLEM SELECTOR PLAN 1
was admitted to icu and intubated extubated in less than 24 hours for seizures ? if pseudo seizures based off of prior  history as documented by other physicians    will check levels . and get neurology consult was admitted to icu and intubated extubated in less than 24 hours for seizures ? if pseudo seizures based off of prior  history as documented by other physicians    will check levels .
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
## 2022-05-25 NOTE — BH CONSULTATION LIAISON ASSESSMENT NOTE - DETAILS
as per HPI; otherwise Unable to assess Reports 2 past SAs, last in 2017 stating "I took a lot of pills to try to harm myself" affirming intent to die.

## 2022-05-25 NOTE — PROGRESS NOTE ADULT - REASON FOR ADMISSION
seizure activity requiring intubation

## 2022-05-25 NOTE — BH CONSULTATION LIAISON ASSESSMENT NOTE - NSBHCHARTREVIEWVS_PSY_A_CORE FT
Vital Signs Last 24 Hrs  T(C): 36.4 (25 May 2022 11:02), Max: 36.9 (24 May 2022 15:19)  T(F): 97.6 (25 May 2022 11:02), Max: 98.4 (24 May 2022 15:19)  HR: 111 (25 May 2022 11:02) (99 - 115)  BP: 115/81 (25 May 2022 11:02) (86/65 - 140/82)  BP(mean): 69 (24 May 2022 17:00) (66 - 73)  RR: 18 (25 May 2022 11:02) (15 - 22)  SpO2: 100% (25 May 2022 11:02) (97% - 100%)

## 2022-05-25 NOTE — BH CONSULTATION LIAISON ASSESSMENT NOTE - NSBHCHARTREVIEWLAB_PSY_A_CORE FT
04-13    141  |  108  |  10  ----------------------------<  117<H>  3.7   |  28  |  0.88    Ca    9.9      13 Apr 2022 11:28

## 2022-05-26 ENCOUNTER — TRANSCRIPTION ENCOUNTER (OUTPATIENT)
Age: 37
End: 2022-05-26

## 2022-05-26 VITALS
OXYGEN SATURATION: 100 % | SYSTOLIC BLOOD PRESSURE: 120 MMHG | TEMPERATURE: 98 F | RESPIRATION RATE: 18 BRPM | DIASTOLIC BLOOD PRESSURE: 81 MMHG | HEART RATE: 96 BPM

## 2022-05-26 LAB — VALPROATE SERPL-MCNC: 64 UG/ML — SIGNIFICANT CHANGE UP (ref 50–100)

## 2022-05-26 PROCEDURE — 99239 HOSP IP/OBS DSCHRG MGMT >30: CPT

## 2022-05-26 PROCEDURE — 99231 SBSQ HOSP IP/OBS SF/LOW 25: CPT

## 2022-05-26 RX ORDER — PANTOPRAZOLE SODIUM 20 MG/1
1 TABLET, DELAYED RELEASE ORAL
Qty: 0 | Refills: 0 | DISCHARGE
Start: 2022-05-26

## 2022-05-26 RX ORDER — FLUTICASONE PROPIONATE 220 MCG
0 AEROSOL WITH ADAPTER (GRAM) INHALATION
Qty: 0 | Refills: 0 | DISCHARGE

## 2022-05-26 RX ORDER — BENZOCAINE AND MENTHOL 5; 1 G/100ML; G/100ML
1 LIQUID ORAL ONCE
Refills: 0 | Status: COMPLETED | OUTPATIENT
Start: 2022-05-26 | End: 2022-05-26

## 2022-05-26 RX ORDER — LANOLIN ALCOHOL/MO/W.PET/CERES
1 CREAM (GRAM) TOPICAL
Qty: 0 | Refills: 0 | DISCHARGE
Start: 2022-05-26

## 2022-05-26 RX ORDER — OXYCODONE AND ACETAMINOPHEN 5; 325 MG/1; MG/1
1 TABLET ORAL ONCE
Refills: 0 | Status: DISCONTINUED | OUTPATIENT
Start: 2022-05-26 | End: 2022-05-26

## 2022-05-26 RX ORDER — BENZOCAINE AND MENTHOL 5; 1 G/100ML; G/100ML
1 LIQUID ORAL
Qty: 0 | Refills: 0 | DISCHARGE
Start: 2022-05-26

## 2022-05-26 RX ORDER — OXYCODONE AND ACETAMINOPHEN 5; 325 MG/1; MG/1
1 TABLET ORAL
Qty: 20 | Refills: 0
Start: 2022-05-26 | End: 2022-05-30

## 2022-05-26 RX ORDER — ALBUTEROL 90 UG/1
2 AEROSOL, METERED ORAL
Qty: 0 | Refills: 0 | DISCHARGE
Start: 2022-05-26

## 2022-05-26 RX ORDER — LEVOTHYROXINE SODIUM 125 MCG
1 TABLET ORAL
Qty: 0 | Refills: 0 | DISCHARGE
Start: 2022-05-26

## 2022-05-26 RX ADMIN — BENZOCAINE AND MENTHOL 1 LOZENGE: 5; 1 LIQUID ORAL at 07:43

## 2022-05-26 RX ADMIN — OXYCODONE AND ACETAMINOPHEN 1 TABLET(S): 5; 325 TABLET ORAL at 00:29

## 2022-05-26 RX ADMIN — QUETIAPINE FUMARATE 200 MILLIGRAM(S): 200 TABLET, FILM COATED ORAL at 21:14

## 2022-05-26 RX ADMIN — DIVALPROEX SODIUM 1000 MILLIGRAM(S): 500 TABLET, DELAYED RELEASE ORAL at 21:13

## 2022-05-26 RX ADMIN — Medication 50 MILLIGRAM(S): at 21:13

## 2022-05-26 RX ADMIN — OXYCODONE AND ACETAMINOPHEN 1 TABLET(S): 5; 325 TABLET ORAL at 13:28

## 2022-05-26 RX ADMIN — APIXABAN 5 MILLIGRAM(S): 2.5 TABLET, FILM COATED ORAL at 06:24

## 2022-05-26 RX ADMIN — Medication 0.5 MILLIGRAM(S): at 15:39

## 2022-05-26 RX ADMIN — Medication 3 MILLIGRAM(S): at 21:19

## 2022-05-26 RX ADMIN — OXYCODONE AND ACETAMINOPHEN 1 TABLET(S): 5; 325 TABLET ORAL at 18:27

## 2022-05-26 RX ADMIN — CHLORHEXIDINE GLUCONATE 1 APPLICATION(S): 213 SOLUTION TOPICAL at 06:34

## 2022-05-26 RX ADMIN — OXYCODONE AND ACETAMINOPHEN 1 TABLET(S): 5; 325 TABLET ORAL at 21:14

## 2022-05-26 RX ADMIN — Medication 1 APPLICATION(S): at 06:24

## 2022-05-26 RX ADMIN — Medication 0.5 MILLIGRAM(S): at 09:22

## 2022-05-26 RX ADMIN — OXYCODONE AND ACETAMINOPHEN 1 TABLET(S): 5; 325 TABLET ORAL at 12:07

## 2022-05-26 RX ADMIN — Medication 75 MICROGRAM(S): at 06:24

## 2022-05-26 RX ADMIN — ARIPIPRAZOLE 30 MILLIGRAM(S): 15 TABLET ORAL at 12:58

## 2022-05-26 RX ADMIN — BENZOCAINE AND MENTHOL 1 LOZENGE: 5; 1 LIQUID ORAL at 14:44

## 2022-05-26 RX ADMIN — OXYCODONE AND ACETAMINOPHEN 1 TABLET(S): 5; 325 TABLET ORAL at 19:58

## 2022-05-26 RX ADMIN — APIXABAN 5 MILLIGRAM(S): 2.5 TABLET, FILM COATED ORAL at 18:28

## 2022-05-26 RX ADMIN — PANTOPRAZOLE SODIUM 40 MILLIGRAM(S): 20 TABLET, DELAYED RELEASE ORAL at 07:43

## 2022-05-26 RX ADMIN — OXYCODONE AND ACETAMINOPHEN 1 TABLET(S): 5; 325 TABLET ORAL at 06:23

## 2022-05-26 RX ADMIN — Medication 50 MILLIGRAM(S): at 12:59

## 2022-05-26 RX ADMIN — QUETIAPINE FUMARATE 200 MILLIGRAM(S): 200 TABLET, FILM COATED ORAL at 07:43

## 2022-05-26 NOTE — DISCHARGE NOTE PROVIDER - NSDCFUSCHEDAPPT_GEN_ALL_CORE_FT
Jose G Byers Physician [facility]  NEUROLOGY [address] [facility]  Scheduled Appointment: 06/08/2022

## 2022-05-26 NOTE — DISCHARGE NOTE NURSING/CASE MANAGEMENT/SOCIAL WORK - PATIENT PORTAL LINK FT
You can access the FollowMyHealth Patient Portal offered by [facility] by registering at the following website: [email or web address]. By joining [email or web address]’s FollowMyHealth portal, you will also be able to view your health information using other applications (apps) compatible with our system.

## 2022-05-26 NOTE — BH CONSULTATION LIAISON PROGRESS NOTE - OTHER
adequate during exam  chronically low end of fair to limited  tearful with dramatic flair  + reactive  concrete  "it hurts!" limited  "my thrroat hurts"

## 2022-05-26 NOTE — DIETITIAN INITIAL EVALUATION ADULT - PERTINENT MEDS FT
MEDICATIONS  (STANDING):  apixaban 5 milliGRAM(s) Oral every 12 hours  ARIPiprazole 30 milliGRAM(s) Oral daily  chlorhexidine 2% Cloths 1 Application(s) Topical <User Schedule>  diVALproex  milliGRAM(s) Oral daily  diVALproex ER 1000 milliGRAM(s) Oral at bedtime  levothyroxine 75 MICROGram(s) Oral daily  loratadine 10 milliGRAM(s) Oral daily  LORazepam     Tablet 0.5 milliGRAM(s) Oral <User Schedule>  lurasidone 40 milliGRAM(s) Oral daily  melatonin 3 milliGRAM(s) Oral at bedtime  pantoprazole    Tablet 40 milliGRAM(s) Oral before breakfast  QUEtiapine 50 milliGRAM(s) Oral <User Schedule>  QUEtiapine 200 milliGRAM(s) Oral <User Schedule>  triamcinolone 0.1% Cream 1 Application(s) Topical two times a day    MEDICATIONS  (PRN):  ALBUTerol    90 MICROgram(s) HFA Inhaler 2 Puff(s) Inhalation every 6 hours PRN Shortness of Breath and/or Wheezing  benzocaine 15 mG/menthol 3.6 mG Lozenge 1 Lozenge Oral every 2 hours PRN Sore Throat  chlorproMAZINE    Tablet 50 milliGRAM(s) Oral every 6 hours PRN agitation  diphenhydrAMINE 50 milliGRAM(s) Oral every 6 hours PRN Rash and/or Itching  oxycodone    5 mG/acetaminophen 325 mG 1 Tablet(s) Oral every 6 hours PRN Severe Pain (7 - 10)

## 2022-05-26 NOTE — DIETITIAN INITIAL EVALUATION ADULT - ENTER FROM (CAL/KG)
Detail Level: Detailed Quality 131: Pain Assessment And Follow-Up: Pain assessment using a standardized tool is documented as negative, no follow-up plan required Quality 130: Documentation Of Current Medications In The Medical Record: Current Medications Documented Quality 402: Tobacco Use And Help With Quitting Among Adolescents: Patient screened for tobacco and never smoked Quality 431: Preventive Care And Screening: Unhealthy Alcohol Use - Screening: Patient screened for unhealthy alcohol use using a single question and scores less than 2 times per year Quality 110: Preventive Care And Screening: Influenza Immunization: Influenza immunization was not ordered or administered, reason not given 25

## 2022-05-26 NOTE — DISCHARGE NOTE NURSING/CASE MANAGEMENT/SOCIAL WORK - NSDCPEFALRISK_GEN_ALL_CORE
For information on Fall & Injury Prevention, visit: [email or web address] OR  [email or web address] OR  https://www.cdc.gov/steadi/patient.html

## 2022-05-26 NOTE — BH CONSULTATION LIAISON PROGRESS NOTE - NSBHCONSULTRECOMMENDOTHER_PSY_A_CORE FT
continue Seroquel 200mg @ 8am, 50mg @ 12pm, 200mg @ 8pm; Abilify 30mg daily, lurasidone 40mg po daily  depakote 500mg po in am and 1000 PO qhs, Lorazepam PO 0.5mg bid, thorazine 50mg q6h PRN,  Tizanidine 2mg bid PRN, Levothyroxine 75mg daily, Pantoprazole 40 mg AM  - thorazine 50mg IM q4hrs PRN for agitation with Ativan 2mg IM q4hrs PRN  - ordered Percocet for Patient; may very well have throat discomfort s/p intubation   - Patient has a legal guardian Maria Elena Kevin [phone]; Patient CANNOT refuse transfer back to group home when medically stable   5/25/22continue Seroquel 200mg @ 8am, 50mg @ 12pm, 200mg @ 8pm; Abilify 30mg daily, lurasidone 40mg po daily  depakote 500mg po in am and 1000 PO qhs, Lorazepam PO 0.5mg bid, thorazine 50mg q6h PRN,  Tizanidine 2mg bid PRN, Levothyroxine 75mg daily, Pantoprazole 40 mg AM  - thorazine 50mg IM q4hrs PRN for agitation with Ativan 2mg IM q4hrs PRN  - ordered Percocet for Patient; may very well have throat discomfort s/p intubation   - Patient has a legal guardian Maria Elena Brown [phone]; Patient CANNOT refuse transfer back to group home when medically stable  5/26/22: continue current medications as ordered  - Patient has a well known history of coming up with a variety of somatic complaints on the day of discharge in order to delay discharge/sabotage discharge back to group home

## 2022-05-26 NOTE — DISCHARGE NOTE PROVIDER - DETAILS OF MALNUTRITION DIAGNOSIS/DIAGNOSES
This patient has been assessed with a concern for Malnutrition and was treated during this hospitalization for the following Nutrition diagnosis/diagnoses:     -  05/26/2022: Morbid obesity (BMI > 40)

## 2022-05-26 NOTE — DIETITIAN INITIAL EVALUATION ADULT - OTHER INFO
Pt adm w/ seizure ; w/ hx asthma ; Factor V Leiden ; Hypothyroid ; Personality disorder. {+} Food allergy - pineapples  - rash on lips. 1:1 at bedside. Provided pt w/ written literature on tips for weight loss.

## 2022-05-26 NOTE — DIETITIAN INITIAL EVALUATION ADULT - PERTINENT LABORATORY DATA
05-25    140  |  109<H>  |  9   ----------------------------<  90  3.6   |  24  |  0.83    Ca    9.1      25 May 2022 09:56  Phos  3.4     05-25  Mg     2.2     05-25    TPro  7.8  /  Alb  3.3  /  TBili  0.3  /  DBili  x   /  AST  21  /  ALT  51  /  AlkPhos  60  05-25

## 2022-05-26 NOTE — BH CONSULTATION LIAISON PROGRESS NOTE - NSBHFUPINTERVALHXFT_PSY_A_CORE
Patient with less behavioral issues / less acting out behaviors since she has been receiving pain medications. Patient reports that the pain medication helps her thorat and back pain. Otherwise, no complaints. Patient denies constipation and reports regular BMs; not expecting her period any time soon. Denies cramps. Asking for pain medication 'the one I got before" as it helped. Otherwise, does not describe or manifest any symptoms of hypomania/brit/psychosis/major depression/ anxiety/panic. Denies any active or passive suicidal or homicidal ideation. Names protective factors (bentley; has interests, hope for future). Endorses medication compliance. Denies adverse medication side effects   Patient with less behavioral issues / less acting out behaviors since she has been receiving pain medications. However, volitional acting out behavioral episodes still continue (expected and baseline), such as throwing the TV remote around last evening. EXAM: Patient standing by the door of her room, looking out. Looks comfortable yet states "I still have pain" and asks for cough drops and syrup. (NOTE: Patient has a well known history of coming up with a variety of somatic complaints on the day of discharge in order to delay discharge/sabotage discharge back to group home). Patient reports that the pain medication helps her throat and back pain. Otherwise, no complaints. Patient denies constipation and reports regular BMs; not expecting her period any time soon. Denies cramps. Asking for pain medication 'the one I got before" as it helped. Otherwise, does not describe or manifest any symptoms of hypomania/brit/psychosis/major depression/ anxiety/panic. Denies any active or passive suicidal or homicidal ideation. Names protective factors (bentley; has interests, hope for future). Endorses medication compliance. Denies adverse medication side effects

## 2022-05-26 NOTE — DISCHARGE NOTE PROVIDER - CARE PROVIDER_API CALL
Jose G Byers)  Neurology  [address], [address]  [place] [address]  Phone: [phone]  Fax: [phone]  Follow Up Time:

## 2022-05-26 NOTE — DISCHARGE NOTE PROVIDER - NSDCCPCAREPLAN_GEN_ALL_CORE_FT
PRINCIPAL DISCHARGE DIAGNOSIS  Diagnosis: Status epilepticus  Assessment and Plan of Treatment:

## 2022-05-26 NOTE — DISCHARGE NOTE PROVIDER - NSDCMRMEDTOKEN_GEN_ALL_CORE_FT
Ala-Maximo 1% topical cream: 1 application topically once  albuterol 90 mcg/inh inhalation aerosol: 2 puff(s) inhaled every 6 hours, As needed, Shortness of Breath and/or Wheezing  ARIPiprazole 30 mg oral tablet: 1 tab(s) orally once a day  Ativan 0.5 mg oral tablet: 1 tab(s) orally 2 times a day  [10AM and 4PM] MDD:1mg  chlorproMAZINE 50 mg oral tablet: 1 tab(s) orally every 6 hours, As needed, agitation/aggression  Claritin 10 mg oral tablet: 1 tab(s) orally once a day  Depakote  mg oral tablet, extended release: 3 tab(s) orally once a day 500 mg in AM and 1000mg bedtime  diphenhydrAMINE 25 mg oral capsule: 1 cap(s) orally every 6 hours, As needed, Rash and/or Itching  Eliquis 5 mg oral tablet: 1 tab(s) orally 2 times a day   Flovent 110 mcg/inh inhalation aerosol with adapter: 1 cap(s) inhaled once a day  fluticasone 50 mcg/inh nasal spray: 1 spray(s) nasal 2 times a day  levothyroxine 75 mcg (0.075 mg) oral tablet: 1 tab(s) orally once a day  lurasidone 40 mg oral tablet: 1 tab(s) orally once a day  melatonin 3 mg oral tablet: 1 tab(s) orally once a day (at bedtime)  menthol-benzocaine 3.6 mg-15 mg mucous membrane lozenge: 1 lozenge mucous membrane every 4 hours as needed  mometasone 220 mcg/inh inhalation aerosol powder: 1 puff(s) inhaled once a day  Multiple Vitamins with Iron oral tablet: 1 tab(s) orally once a day  olopatadine 0.1% ophthalmic solution: 1 drop(s) to each affected eye every 6 hours, As Needed  pantoprazole 40 mg oral delayed release tablet: 1 tab(s) orally once a day (before a meal)  QUEtiapine 50 mg oral tablet: 1 tab(s) orally once a day at noon  SEROquel 200 mg oral tablet: 1 tab(s) orally 2 times a day [8AM and 8PM]  tiZANidine 2 mg oral tablet: 1 tab(s) orally 2 times a day, As Needed -Muscle Spasm    Tylenol 325 mg oral tablet: 2 tab(s) orally every 6 hours, As Needed  Vitamin D2 1.25 mg (50,000 intl units) oral capsule: 1 cap(s) orally once a week   Ala-Maximo 1% topical cream: 1 application topically once  albuterol 90 mcg/inh inhalation aerosol: 2 puff(s) inhaled every 6 hours, As needed, Shortness of Breath and/or Wheezing  ARIPiprazole 30 mg oral tablet: 1 tab(s) orally once a day  Ativan 0.5 mg oral tablet: 1 tab(s) orally 2 times a day  [10AM and 4PM] MDD:1mg  chlorproMAZINE 50 mg oral tablet: 1 tab(s) orally every 6 hours, As needed, agitation/aggression  Claritin 10 mg oral tablet: 1 tab(s) orally once a day  Depakote  mg oral tablet, extended release: 3 tab(s) orally once a day 500 mg in AM and 1000mg bedtime  diphenhydrAMINE 25 mg oral capsule: 1 cap(s) orally every 6 hours, As needed, Rash and/or Itching  Eliquis 5 mg oral tablet: 1 tab(s) orally 2 times a day   Flovent 110 mcg/inh inhalation aerosol with adapter: 1 cap(s) inhaled once a day  fluticasone 50 mcg/inh nasal spray: 1 spray(s) nasal 2 times a day  levothyroxine 75 mcg (0.075 mg) oral tablet: 1 tab(s) orally once a day  lurasidone 40 mg oral tablet: 1 tab(s) orally once a day  melatonin 3 mg oral tablet: 1 tab(s) orally once a day (at bedtime)  menthol-benzocaine 3.6 mg-15 mg mucous membrane lozenge: 1 lozenge mucous membrane every 4 hours as needed  mometasone 220 mcg/inh inhalation aerosol powder: 1 puff(s) inhaled once a day  Multiple Vitamins with Iron oral tablet: 1 tab(s) orally once a day  olopatadine 0.1% ophthalmic solution: 1 drop(s) to each affected eye every 6 hours, As Needed  pantoprazole 40 mg oral delayed release tablet: 1 tab(s) orally once a day (before a meal)  Percocet 5 mg-325 mg oral tablet: 1 tab(s) orally every 6 hours, As needed, Severe Pain (7 - 10) MDD:4  QUEtiapine 50 mg oral tablet: 1 tab(s) orally once a day at noon  SEROquel 200 mg oral tablet: 1 tab(s) orally 2 times a day [8AM and 8PM]  tiZANidine 2 mg oral tablet: 1 tab(s) orally 2 times a day, As Needed -Muscle Spasm    Tylenol 325 mg oral tablet: 2 tab(s) orally every 6 hours, As Needed  Vitamin D2 1.25 mg (50,000 intl units) oral capsule: 1 cap(s) orally once a week

## 2022-05-26 NOTE — DISCHARGE NOTE PROVIDER - HOSPITAL COURSE
37 YO female with PMH asthma, HTN, factor V Leiden deficiency, DVT/PE on xarelto, endometriosis, hypothyroid, seizure disorder, schizoaffective vs. bipolar disorder, personality disorder, intellectual disability, hx of cocaine use, prior suicide attempts, multiple past psych hospitalizations, aggressive behavior, presented from group home for abdominal pain and nausea.  In the ED, pt had blood work and CT A/P, all negative - she was to be discharged back to group home when she was found "slumped" in the hallway and shaking, presumably a seizure. She was given 2mg ativan and keppra, still seizing - given 2mg more of ativan and magnesium. Pt was noted to still be shaking so was intubated in ED.    Seizure:   Was admitted to icu and intubated extubated in less than 24 hours for seizures ? if pseudo seizures based off of prior history as documented by other physicians   Patient with multiple prior admissions, negative EEGs   will check levels .    Asthma:   Stable    Albuterol prn   Room air spO2 >95%    Factor V Leiden.   Continue with Eliquis.    Hypothyroid:  Continue with synthroid.    Personality disorder:   Continue with meds  Patient was seen by psychiatry on this admission  No psychiatric contraindications to discharge. Group home staff should administer patient her medications; do a mouth check, as well, since seizures are due to her not taking her anti-convulsants. Given Patient's functioning level, she should not be managing her own medications. Has outpatient services set up already and does not need any referrals.      On 5/26/22 this case was reviewed with Dr. Snow, the patient is medically stable and optimized for discharge. 35 YO female with PMH asthma, HTN, factor V Leiden deficiency, DVT/PE on xarelto, endometriosis, hypothyroid, seizure disorder, schizoaffective vs. bipolar disorder, personality disorder, intellectual disability, hx of cocaine use, prior suicide attempts, multiple past psych hospitalizations, aggressive behavior, presented from group home for abdominal pain and nausea.  In the ED, pt had blood work and CT A/P, all negative - she was to be discharged back to group home when she was found "slumped" in the hallway and shaking, presumably a seizure. She was given 2mg ativan and keppra, still seizing - given 2mg more of ativan and magnesium. Pt was noted to still be shaking so was intubated in ED.    Seizure:   Was admitted to icu and intubated extubated in less than 24 hours for seizures ? if pseudo seizures based off of prior history as documented by other physicians   Patient with multiple prior admissions, negative EEGs   will check levels .    Asthma:   Stable    Albuterol prn   Room air spO2 >95%    Factor V Leiden.   Continue with Eliquis.    Hypothyroid:  Continue with synthroid.    Personality disorder:   Continue with meds  Patient was seen by psychiatry on this admission  No psychiatric contraindications to discharge. Group home staff should administer patient her medications; do a mouth check, as well, since seizures are due to her not taking her anti-convulsants. Given Patient's functioning level, she should not be managing her own medications. Has outpatient services set up already and does not need any referrals.      On 5/26/22 this case was reviewed with Dr. Snow, the patient is medically stable and optimized for discharge.   iStop was performed on 5/26/22, reference #: [id number]

## 2022-05-26 NOTE — BH CONSULTATION LIAISON PROGRESS NOTE - NSBHCHARTREVIEWVS_PSY_A_CORE FT
Vital Signs Last 24 Hrs  T(C): 36.4 (26 May 2022 11:56), Max: 36.8 (25 May 2022 17:00)  T(F): 97.5 (26 May 2022 11:56), Max: 98.2 (25 May 2022 17:00)  HR: 114 (26 May 2022 11:56) (86 - 114)  BP: 115/81 (26 May 2022 11:56) (103/79 - 134/86)  BP(mean): 100 (26 May 2022 06:09) (100 - 100)  RR: 16 (26 May 2022 11:56) (16 - 18)  SpO2: 97% (26 May 2022 11:56) (97% - 99%)

## 2022-05-29 ENCOUNTER — EMERGENCY (EMERGENCY)
Facility: HOSPITAL | Age: 37
LOS: 0 days | Discharge: HOME | End: 2022-05-29
Attending: EMERGENCY MEDICINE
Payer: MEDICARE

## 2022-05-29 VITALS
OXYGEN SATURATION: 96 % | TEMPERATURE: 98 F | RESPIRATION RATE: 19 BRPM | HEIGHT: 68 IN | DIASTOLIC BLOOD PRESSURE: 88 MMHG | HEART RATE: 107 BPM | SYSTOLIC BLOOD PRESSURE: 123 MMHG | WEIGHT: 250 LBS

## 2022-05-29 VITALS
TEMPERATURE: 99 F | HEART RATE: 108 BPM | DIASTOLIC BLOOD PRESSURE: 91 MMHG | RESPIRATION RATE: 19 BRPM | SYSTOLIC BLOOD PRESSURE: 138 MMHG | OXYGEN SATURATION: 100 %

## 2022-05-29 DIAGNOSIS — Z88.8 ALLERGY STATUS TO OTHER DRUGS, MEDICAMENTS AND BIOLOGICAL SUBSTANCES: ICD-10-CM

## 2022-05-29 DIAGNOSIS — Z88.6 ALLERGY STATUS TO ANALGESIC AGENT: ICD-10-CM

## 2022-05-29 DIAGNOSIS — E03.9 HYPOTHYROIDISM, UNSPECIFIED: ICD-10-CM

## 2022-05-29 DIAGNOSIS — Z91.018 ALLERGY TO OTHER FOODS: ICD-10-CM

## 2022-05-29 DIAGNOSIS — Z86.718 PERSONAL HISTORY OF OTHER VENOUS THROMBOSIS AND EMBOLISM: ICD-10-CM

## 2022-05-29 DIAGNOSIS — Z79.01 LONG TERM (CURRENT) USE OF ANTICOAGULANTS: ICD-10-CM

## 2022-05-29 DIAGNOSIS — Z20.822 CONTACT WITH AND (SUSPECTED) EXPOSURE TO COVID-19: ICD-10-CM

## 2022-05-29 DIAGNOSIS — K21.9 GASTRO-ESOPHAGEAL REFLUX DISEASE WITHOUT ESOPHAGITIS: ICD-10-CM

## 2022-05-29 DIAGNOSIS — J02.9 ACUTE PHARYNGITIS, UNSPECIFIED: ICD-10-CM

## 2022-05-29 DIAGNOSIS — Z88.5 ALLERGY STATUS TO NARCOTIC AGENT: ICD-10-CM

## 2022-05-29 DIAGNOSIS — Z88.0 ALLERGY STATUS TO PENICILLIN: ICD-10-CM

## 2022-05-29 DIAGNOSIS — Z91.040 LATEX ALLERGY STATUS: ICD-10-CM

## 2022-05-29 DIAGNOSIS — F31.9 BIPOLAR DISORDER, UNSPECIFIED: ICD-10-CM

## 2022-05-29 DIAGNOSIS — R05.1 ACUTE COUGH: ICD-10-CM

## 2022-05-29 LAB
CULTURE RESULTS: SIGNIFICANT CHANGE UP
CULTURE RESULTS: SIGNIFICANT CHANGE UP
FLUAV AG NPH QL: SIGNIFICANT CHANGE UP
FLUBV AG NPH QL: SIGNIFICANT CHANGE UP
SARS-COV-2 RNA SPEC QL NAA+PROBE: SIGNIFICANT CHANGE UP
SPECIMEN SOURCE: SIGNIFICANT CHANGE UP
SPECIMEN SOURCE: SIGNIFICANT CHANGE UP

## 2022-05-29 PROCEDURE — 99284 EMERGENCY DEPT VISIT MOD MDM: CPT

## 2022-05-29 PROCEDURE — 71045 X-RAY EXAM CHEST 1 VIEW: CPT | Mod: 26

## 2022-05-29 RX ORDER — ACETAMINOPHEN 500 MG
2 TABLET ORAL
Qty: 30 | Refills: 0
Start: 2022-05-29 | End: 2022-06-02

## 2022-05-29 RX ORDER — BENZOCAINE AND MENTHOL 5; 1 G/100ML; G/100ML
1 LIQUID ORAL
Qty: 36 | Refills: 0
Start: 2022-05-29 | End: 2022-05-31

## 2022-05-29 RX ORDER — IBUPROFEN 200 MG
600 TABLET ORAL ONCE
Refills: 0 | Status: DISCONTINUED | OUTPATIENT
Start: 2022-05-29 | End: 2022-05-29

## 2022-05-29 RX ORDER — BENZOCAINE AND MENTHOL 5; 1 G/100ML; G/100ML
1 LIQUID ORAL ONCE
Refills: 0 | Status: COMPLETED | OUTPATIENT
Start: 2022-05-29 | End: 2022-05-29

## 2022-05-29 RX ORDER — OXYCODONE AND ACETAMINOPHEN 5; 325 MG/1; MG/1
1 TABLET ORAL ONCE
Refills: 0 | Status: DISCONTINUED | OUTPATIENT
Start: 2022-05-29 | End: 2022-05-29

## 2022-05-29 RX ADMIN — BENZOCAINE AND MENTHOL 1 LOZENGE: 5; 1 LIQUID ORAL at 10:43

## 2022-05-29 RX ADMIN — OXYCODONE AND ACETAMINOPHEN 1 TABLET(S): 5; 325 TABLET ORAL at 13:44

## 2022-05-29 RX ADMIN — Medication 100 MILLIGRAM(S): at 10:43

## 2022-05-29 RX ADMIN — OXYCODONE AND ACETAMINOPHEN 1 TABLET(S): 5; 325 TABLET ORAL at 16:27

## 2022-05-29 NOTE — ED PROVIDER NOTE - PATIENT PORTAL LINK FT
You can access the FollowMyHealth Patient Portal offered by [facility] by registering at the following website: [email or web address] By joining [facility]’s FollowMyHealth portal, you will also be able to view your health information using other applications (apps) compatible with our system.

## 2022-05-29 NOTE — ED PROVIDER NOTE - PROGRESS NOTE DETAILS
Pt is feeling better, viral swab negative, and cxr clear on my exam. Pt is comfortable and ready for d/c.

## 2022-05-29 NOTE — ED PROVIDER NOTE - OBJECTIVE STATEMENT
35 yo F wP of asthma, GERD, bipolar disorder, MR and insomnia presents to the ED for sore throat and productive cough. Pt describes clear to brown color sputum Pt was recently intubated in the ED. Pt is fully vaccinated against COVID. Pt has no other complaints in the ED.

## 2022-05-29 NOTE — ED ADULT TRIAGE NOTE - HEIGHT IN INCHES
Patient's mother called and left a voicemail regarding her child needing an appointment for a sore on her foot. RN scheduled appointment with MD this afternoon. No other signs or symptoms of upper respiratory infection at this time.    8

## 2022-05-29 NOTE — ED ADULT TRIAGE NOTE - CHIEF COMPLAINT QUOTE
Cough, congestion, weakness, lethargy, sore throat, body aches since 5/19 after DC from hospital  HX epilepsy, Asthma, HTN, IBS, gastritis  Last Tylenol 4am Cough, congestion, weakness, lethargy, sore throat, body aches since 5/26 after DC from hospital  HX epilepsy, Asthma, HTN, IBS, gastritis  Last Tylenol 4am

## 2022-05-29 NOTE — ED ADULT NURSE NOTE - NS ED NURSE LEVEL OF CONSCIOUSNESS ORIENTATION
Subjective:       Patient ID: Leslye Witt is a 42 y.o. female with multiple medical diagnoses as listed in the medical history and problem list that presents for itching on neck (Began Saturday, benadryl, zyrtec, hydrocortisone cream) and hives on face (yesterday, began medrol dose pack)  .    Chief Complaint: itching on neck (Began Saturday, benadryl, zyrtec, hydrocortisone cream) and hives on face (yesterday, began medrol dose pack)      Rash   This is a new (saturday night ) problem. The current episode started in the past 7 days. The problem has been gradually worsening since onset. The affected locations include thehead, face, neck, chest, torso, back, abdomen, groin, left ear, left shoulder, left axilla, left arm, left elbow, left hand, left wrist, right ear, right shoulder, right axilla, right arm, right elbow, right hand and right wrist. Location: neck first then chest arm and legs and face. The rash is characterized by burning, redness, itchiness and swelling. It is unknown if there was an exposure to a precipitant. Associated symptoms include facial edema and fatigue. Pertinent negatives include no anorexia, congestion, cough, diarrhea, eye pain, fever, joint pain, nail changes, rhinorrhea, shortness of breath, sore throat or vomiting. Past treatments include anti-itch cream, antihistamine, cold compress, topical steroids and oral steroids. The treatment provided mild relief. Her past medical history is significant for allergies and varicella. There is no history of asthma or eczema.      She took a trip this weekend: Saturday she had eggs, turkey/ham, sandwiches, ananya crab, salad, guacamole, chips. Sunday: eggs, turkey/peter,  Hash brown, fish tacos. Sunday: steak, tuna, brussels. Monday: chicken bites, lemonade, red beans, rice.   She did have almonds all weekend  She had watermelon and apple.     Review of Systems   Constitutional: Positive for fatigue. Negative for fever.   HENT: Negative for  congestion, rhinorrhea and sore throat.    Eyes: Negative for pain.   Respiratory: Negative for cough and shortness of breath.    Gastrointestinal: Negative for anorexia, diarrhea and vomiting.   Musculoskeletal: Negative for joint pain.   Skin: Positive for rash. Negative for nail changes.         PAST MEDICAL HISTORY:  Past Medical History:   Diagnosis Date    Abnormal Pap smear of vagina 1998    Allergy     GERD (gastroesophageal reflux disease)        SOCIAL HISTORY:  Social History     Socioeconomic History    Marital status:      Spouse name: Not on file    Number of children: Not on file    Years of education: Not on file    Highest education level: Not on file   Social Needs    Financial resource strain: Not on file    Food insecurity - worry: Not on file    Food insecurity - inability: Not on file    Transportation needs - medical: Not on file    Transportation needs - non-medical: Not on file   Occupational History    Not on file   Tobacco Use    Smoking status: Never Smoker    Smokeless tobacco: Never Used   Substance and Sexual Activity    Alcohol use: Yes     Alcohol/week: 1.5 oz     Types: 3 Standard drinks or equivalent per week    Drug use: No    Sexual activity: Yes     Partners: Male     Birth control/protection: Other-see comments     Comment:  had a vasectomy   Other Topics Concern    Not on file   Social History Narrative    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    Travel: [place] [employer]       ALLERGIES AND MEDICATIONS: updated and reviewed.  Review of patient's allergies indicates:  No Known Allergies  Current Outpatient Medications   Medication Sig Dispense Refill    CALCIUM CARBONATE (CALCIUM 500 ORAL) Take 500 mg by mouth once daily.       cetirizine (ZYRTEC) 10 MG tablet Take 10 mg by mouth once daily.        multivitamin capsule Take 1 capsule by mouth once daily.        vitamin D 1000 units Tab Take 1,000 Units by mouth once daily.       "EPINEPHrine (EPIPEN) 0.3 mg/0.3 mL AtIn Inject 0.3 mLs (0.3 mg total) into the muscle once. for 1 dose 2 each 0    hydrOXYzine HCl (ATARAX) 25 MG tablet Take 1 tablet (25 mg total) by mouth every evening. 30 tablet 0    predniSONE (DELTASONE) 20 MG tablet Take 1 tablet (20 mg total) by mouth once daily. Take 3 pills daily for 3 days, then 2 pills daily for 3 days, then 1 pill daily for 3 days, then 1/2 pill daily for 4 days. for 10 days 20 tablet 0    ranitidine (ZANTAC) 150 MG tablet Take 1 tablet (150 mg total) by mouth 2 (two) times daily. 60 tablet 11     No current facility-administered medications for this visit.          Objective:   /86 (BP Location: Right arm, Patient Position: Sitting, BP Method: Medium (Manual))   Pulse 86   Temp 98.8 °F (37.1 °C) (Oral)   Resp 16   Ht 5' 5" (1.651 m)   Wt 62.7 kg (138 lb 3.7 oz)   LMP 09/03/2015   SpO2 98%   BMI 23.00 kg/m²      Physical Exam   Constitutional: She is oriented to person, place, and time. No distress.   HENT:   Head: Normocephalic and atraumatic.   Eyes: Conjunctivae and EOM are normal.   Cardiovascular: Normal rate and regular rhythm.   Pulmonary/Chest: Effort normal and breath sounds normal. She has no wheezes.   Abdominal: Soft. Bowel sounds are normal.   Neurological: She is alert and oriented to person, place, and time.   Skin: Rash noted. Rash is urticarial (all over).           Assessment:       1. Allergic reaction, initial encounter        Plan:       Allergic reaction, initial encounter  -     predniSONE (DELTASONE) 20 MG tablet; Take 1 tablet (20 mg total) by mouth once daily. Take 3 pills daily for 3 days, then 2 pills daily for 3 days, then 1 pill daily for 3 days, then 1/2 pill daily for 4 days. for 10 days  Dispense: 20 tablet; Refill: 0  -     Ambulatory referral to Allergy  -     methylPREDNISolone acetate injection 80 mg; Inject 1 mL (80 mg total) into the muscle once.  -     hydrOXYzine HCl (ATARAX) 25 MG tablet; Take 1 " tablet (25 mg total) by mouth every evening.  Dispense: 30 tablet; Refill: 0  -     EPINEPHrine (EPIPEN) 0.3 mg/0.3 mL AtIn; Inject 0.3 mLs (0.3 mg total) into the muscle once. for 1 dose  Dispense: 2 each; Refill: 0  -     ranitidine (ZANTAC) 150 MG tablet; Take 1 tablet (150 mg total) by mouth 2 (two) times daily.  Dispense: 60 tablet; Refill: 11  -     ALLERGEN ALMONDS; Future; Expected date: 08/28/2018  -     ALLERGEN CRAB IGE; Future; Expected date: 08/28/2018  -     ALLERGEN EGG WHITE; Future; Expected date: 08/28/2018  -     ALLERGEN EGG YOLK; Future; Expected date: 08/28/2018    Food diary:  Eliminate almonds, eggs, and shellfish  Once resolved may add 1 item and give 3 days to react   [phone] Referrals         No Follow-up on file.   Oriented - self; Oriented - place; Oriented - time

## 2022-05-29 NOTE — ED ADULT NURSE NOTE - OBJECTIVE STATEMENT
A&OX4. patient states A&OX4. patient states throat pain, chills , flu-like symptoms at this time. patient states fevers afebrile  at this time. PMH Bipolar.

## 2022-05-31 ENCOUNTER — EMERGENCY (EMERGENCY)
Facility: HOSPITAL | Age: 37
LOS: 1 days | Discharge: HOME | End: 2022-05-31
Attending: EMERGENCY MEDICINE | Admitting: EMERGENCY MEDICINE
Payer: MEDICARE

## 2022-05-31 VITALS
SYSTOLIC BLOOD PRESSURE: 141 MMHG | TEMPERATURE: 99 F | HEART RATE: 112 BPM | OXYGEN SATURATION: 97 % | DIASTOLIC BLOOD PRESSURE: 89 MMHG | HEIGHT: 68 IN | RESPIRATION RATE: 18 BRPM

## 2022-05-31 PROCEDURE — 99285 EMERGENCY DEPT VISIT HI MDM: CPT

## 2022-05-31 NOTE — ED ADULT TRIAGE NOTE - CHIEF COMPLAINT QUOTE
Brought in EMS, c/o epigastric pain radiating to RUQ 1x day, w/ nausea and one episode of bright red stool. PMH asthma, HTN, seizure disorder, PE on Eliquis

## 2022-06-01 VITALS
TEMPERATURE: 98 F | OXYGEN SATURATION: 100 % | DIASTOLIC BLOOD PRESSURE: 73 MMHG | RESPIRATION RATE: 16 BRPM | HEART RATE: 98 BPM | SYSTOLIC BLOOD PRESSURE: 114 MMHG

## 2022-06-01 LAB
ALBUMIN SERPL ELPH-MCNC: 3.8 G/DL — SIGNIFICANT CHANGE UP (ref 3.3–5)
ALP SERPL-CCNC: 61 U/L — SIGNIFICANT CHANGE UP (ref 40–120)
ALT FLD-CCNC: 28 U/L — SIGNIFICANT CHANGE UP (ref 4–33)
ANION GAP SERPL CALC-SCNC: 13 MMOL/L — SIGNIFICANT CHANGE UP (ref 7–14)
APPEARANCE UR: ABNORMAL
APTT BLD: 31.3 SEC — SIGNIFICANT CHANGE UP (ref 27–36.3)
AST SERPL-CCNC: 21 U/L — SIGNIFICANT CHANGE UP (ref 4–32)
BACTERIA # UR AUTO: ABNORMAL
BASOPHILS # BLD AUTO: 0.02 K/UL — SIGNIFICANT CHANGE UP (ref 0–0.2)
BASOPHILS NFR BLD AUTO: 0.3 % — SIGNIFICANT CHANGE UP (ref 0–2)
BILIRUB SERPL-MCNC: <0.2 MG/DL — SIGNIFICANT CHANGE UP (ref 0.2–1.2)
BILIRUB UR-MCNC: NEGATIVE — SIGNIFICANT CHANGE UP
BLD GP AB SCN SERPL QL: NEGATIVE — SIGNIFICANT CHANGE UP
BUN SERPL-MCNC: 14 MG/DL — SIGNIFICANT CHANGE UP (ref 7–23)
CALCIUM SERPL-MCNC: 9 MG/DL — SIGNIFICANT CHANGE UP (ref 8.4–10.5)
CHLORIDE SERPL-SCNC: 104 MMOL/L — SIGNIFICANT CHANGE UP (ref 98–107)
CO2 SERPL-SCNC: 22 MMOL/L — SIGNIFICANT CHANGE UP (ref 22–31)
COLOR SPEC: YELLOW — SIGNIFICANT CHANGE UP
CREAT SERPL-MCNC: 0.78 MG/DL — SIGNIFICANT CHANGE UP (ref 0.5–1.3)
DIFF PNL FLD: ABNORMAL
EGFR: 102 ML/MIN/1.73M2 — SIGNIFICANT CHANGE UP
EOSINOPHIL # BLD AUTO: 0.04 K/UL — SIGNIFICANT CHANGE UP (ref 0–0.5)
EOSINOPHIL NFR BLD AUTO: 0.7 % — SIGNIFICANT CHANGE UP (ref 0–6)
EPI CELLS # UR: 12 /HPF — HIGH (ref 0–5)
GLUCOSE SERPL-MCNC: 95 MG/DL — SIGNIFICANT CHANGE UP (ref 70–99)
GLUCOSE UR QL: NEGATIVE — SIGNIFICANT CHANGE UP
HCG SERPL-ACNC: <5 MIU/ML — SIGNIFICANT CHANGE UP
HCT VFR BLD CALC: 33.8 % — LOW (ref 34.5–45)
HGB BLD-MCNC: 11 G/DL — LOW (ref 11.5–15.5)
HYALINE CASTS # UR AUTO: 3 /LPF — SIGNIFICANT CHANGE UP (ref 0–7)
IANC: 2.96 K/UL — SIGNIFICANT CHANGE UP (ref 1.8–7.4)
IMM GRANULOCYTES NFR BLD AUTO: 0.5 % — SIGNIFICANT CHANGE UP (ref 0–1.5)
INR BLD: 1.11 RATIO — SIGNIFICANT CHANGE UP (ref 0.88–1.16)
KETONES UR-MCNC: NEGATIVE — SIGNIFICANT CHANGE UP
LEUKOCYTE ESTERASE UR-ACNC: NEGATIVE — SIGNIFICANT CHANGE UP
LYMPHOCYTES # BLD AUTO: 2.31 K/UL — SIGNIFICANT CHANGE UP (ref 1–3.3)
LYMPHOCYTES # BLD AUTO: 38.9 % — SIGNIFICANT CHANGE UP (ref 13–44)
MCHC RBC-ENTMCNC: 30.3 PG — SIGNIFICANT CHANGE UP (ref 27–34)
MCHC RBC-ENTMCNC: 32.5 GM/DL — SIGNIFICANT CHANGE UP (ref 32–36)
MCV RBC AUTO: 93.1 FL — SIGNIFICANT CHANGE UP (ref 80–100)
MONOCYTES # BLD AUTO: 0.58 K/UL — SIGNIFICANT CHANGE UP (ref 0–0.9)
MONOCYTES NFR BLD AUTO: 9.8 % — SIGNIFICANT CHANGE UP (ref 2–14)
NEUTROPHILS # BLD AUTO: 2.96 K/UL — SIGNIFICANT CHANGE UP (ref 1.8–7.4)
NEUTROPHILS NFR BLD AUTO: 49.8 % — SIGNIFICANT CHANGE UP (ref 43–77)
NITRITE UR-MCNC: NEGATIVE — SIGNIFICANT CHANGE UP
NRBC # BLD: 0 /100 WBCS — SIGNIFICANT CHANGE UP
NRBC # FLD: 0 K/UL — SIGNIFICANT CHANGE UP
OB PNL STL: POSITIVE
PH UR: 7 — SIGNIFICANT CHANGE UP (ref 5–8)
PLATELET # BLD AUTO: 145 K/UL — LOW (ref 150–400)
POTASSIUM SERPL-MCNC: 4.3 MMOL/L — SIGNIFICANT CHANGE UP (ref 3.5–5.3)
POTASSIUM SERPL-SCNC: 4.3 MMOL/L — SIGNIFICANT CHANGE UP (ref 3.5–5.3)
PROT SERPL-MCNC: 6.9 G/DL — SIGNIFICANT CHANGE UP (ref 6–8.3)
PROT UR-MCNC: ABNORMAL
PROTHROM AB SERPL-ACNC: 12.9 SEC — SIGNIFICANT CHANGE UP (ref 10.5–13.4)
RBC # BLD: 3.63 M/UL — LOW (ref 3.8–5.2)
RBC # FLD: 15 % — HIGH (ref 10.3–14.5)
RBC CASTS # UR COMP ASSIST: 48 /HPF — HIGH (ref 0–4)
RH IG SCN BLD-IMP: POSITIVE — SIGNIFICANT CHANGE UP
SODIUM SERPL-SCNC: 139 MMOL/L — SIGNIFICANT CHANGE UP (ref 135–145)
SP GR SPEC: 1.03 — SIGNIFICANT CHANGE UP (ref 1–1.05)
UROBILINOGEN FLD QL: SIGNIFICANT CHANGE UP
VALPROATE SERPL-MCNC: 84.6 UG/ML — SIGNIFICANT CHANGE UP (ref 50–100)
WBC # BLD: 5.94 K/UL — SIGNIFICANT CHANGE UP (ref 3.8–10.5)
WBC # FLD AUTO: 5.94 K/UL — SIGNIFICANT CHANGE UP (ref 3.8–10.5)
WBC UR QL: 11 /HPF — HIGH (ref 0–5)

## 2022-06-01 PROCEDURE — 99283 EMERGENCY DEPT VISIT LOW MDM: CPT

## 2022-06-01 RX ORDER — ACETAMINOPHEN 500 MG
650 TABLET ORAL ONCE
Refills: 0 | Status: COMPLETED | OUTPATIENT
Start: 2022-06-01 | End: 2022-06-01

## 2022-06-01 RX ORDER — DIPHENHYDRAMINE HCL 50 MG
50 CAPSULE ORAL ONCE
Refills: 0 | Status: COMPLETED | OUTPATIENT
Start: 2022-06-01 | End: 2022-06-01

## 2022-06-01 RX ORDER — LEVETIRACETAM 250 MG/1
1000 TABLET, FILM COATED ORAL ONCE
Refills: 0 | Status: DISCONTINUED | OUTPATIENT
Start: 2022-06-01 | End: 2022-06-01

## 2022-06-01 RX ORDER — SODIUM CHLORIDE 9 MG/ML
1000 INJECTION INTRAMUSCULAR; INTRAVENOUS; SUBCUTANEOUS ONCE
Refills: 0 | Status: COMPLETED | OUTPATIENT
Start: 2022-06-01 | End: 2022-06-01

## 2022-06-01 RX ORDER — DIPHENHYDRAMINE HCL 50 MG
25 CAPSULE ORAL ONCE
Refills: 0 | Status: COMPLETED | OUTPATIENT
Start: 2022-06-01 | End: 2022-06-01

## 2022-06-01 RX ORDER — VALPROIC ACID (AS SODIUM SALT) 250 MG/5ML
1000 SOLUTION, ORAL ORAL ONCE
Refills: 0 | Status: COMPLETED | OUTPATIENT
Start: 2022-06-01 | End: 2022-06-01

## 2022-06-01 RX ORDER — CHLORPROMAZINE HCL 10 MG
100 TABLET ORAL ONCE
Refills: 0 | Status: COMPLETED | OUTPATIENT
Start: 2022-06-01 | End: 2022-06-01

## 2022-06-01 RX ORDER — ACETAMINOPHEN 500 MG
1000 TABLET ORAL ONCE
Refills: 0 | Status: COMPLETED | OUTPATIENT
Start: 2022-06-01 | End: 2022-06-01

## 2022-06-01 RX ORDER — HALOPERIDOL DECANOATE 100 MG/ML
2.5 INJECTION INTRAMUSCULAR ONCE
Refills: 0 | Status: COMPLETED | OUTPATIENT
Start: 2022-06-01 | End: 2022-06-01

## 2022-06-01 RX ORDER — METOCLOPRAMIDE HCL 10 MG
10 TABLET ORAL ONCE
Refills: 0 | Status: COMPLETED | OUTPATIENT
Start: 2022-06-01 | End: 2022-06-01

## 2022-06-01 RX ADMIN — Medication 100 MILLIGRAM(S): at 15:31

## 2022-06-01 RX ADMIN — SODIUM CHLORIDE 1000 MILLILITER(S): 9 INJECTION INTRAMUSCULAR; INTRAVENOUS; SUBCUTANEOUS at 01:47

## 2022-06-01 RX ADMIN — Medication 2 MILLIGRAM(S): at 04:40

## 2022-06-01 RX ADMIN — LEVETIRACETAM 1000 MILLIGRAM(S): 250 TABLET, FILM COATED ORAL at 04:56

## 2022-06-01 RX ADMIN — Medication 400 MILLIGRAM(S): at 02:58

## 2022-06-01 RX ADMIN — Medication 2 MILLIGRAM(S): at 04:55

## 2022-06-01 RX ADMIN — HALOPERIDOL DECANOATE 2.5 MILLIGRAM(S): 100 INJECTION INTRAMUSCULAR at 04:18

## 2022-06-01 RX ADMIN — Medication 20 MILLIGRAM(S): at 04:18

## 2022-06-01 RX ADMIN — SODIUM CHLORIDE 1000 MILLILITER(S): 9 INJECTION INTRAMUSCULAR; INTRAVENOUS; SUBCUTANEOUS at 08:00

## 2022-06-01 RX ADMIN — SODIUM CHLORIDE 1000 MILLILITER(S): 9 INJECTION INTRAMUSCULAR; INTRAVENOUS; SUBCUTANEOUS at 06:39

## 2022-06-01 RX ADMIN — Medication 25 MILLIGRAM(S): at 14:15

## 2022-06-01 RX ADMIN — Medication 50 MILLIGRAM(S): at 15:32

## 2022-06-01 RX ADMIN — Medication 60 MILLIGRAM(S): at 05:25

## 2022-06-01 RX ADMIN — Medication 2 MILLIGRAM(S): at 15:32

## 2022-06-01 RX ADMIN — Medication 1000 MILLIGRAM(S): at 06:25

## 2022-06-01 RX ADMIN — Medication 10 MILLIGRAM(S): at 01:48

## 2022-06-01 NOTE — CONSULT NOTE ADULT - SUBJECTIVE AND OBJECTIVE BOX
HPI:  36 y.o. F with PMH of obesity, Factor V Leiden Deficiency, seizure, pseudoseizure, DVT/PE on xarelto, endometriosis, asthma presented to the ED for abdominal pain and nausea. Neurology consulted after 4 min episode of seizure like activity observed to be involving b/l extremities. Pt does not remember what happened. No reported derangement of vital sign changes. Was given her 1 dose of home depakote 1500 mg qd. Loaded with Keppra and given ativan 2 mg x2. Of note, has recent visit from - for seizure like activity, intubated and sedated in ED. At that time, EEG obtained it showed diffuse excess beta activity with intermittent myogenic artifacts. Currently on VPA 1500 mg qd. Seen Dr. Byers in 2022 for seizure and recommended EEG and MRI brain. MR brain w/o 3/2/22 showed few scattered nonspecific foci of abnormal signal in the periventricular and subcortical white matter. Prior notes indicate pt gets abdominal pain as an aura and often followed by GTC. Often times, pt gets episodes when stressed, like when about to return to group home. Currently patient is somnolent, awakens upon noxious stimuli. Has lower abdominal pain but no burning with urination. Stated has fever and runny nose for unclear duration of time.         REVIEW OF SYSTEMS  A 10-system ROS was performed and is negative except for those items noted above and/or in the HPI.    PAST MEDICAL & SURGICAL HISTORY:  Asthma      Seizure      Factor V Leiden      Bipolar disorder      Endometriosis      History of DVT in adulthood  RLE on eliquis      Hypothyroid      Seasonal allergies      Insomnia      GERD (gastroesophageal reflux disease)      No significant past surgical history        FAMILY HISTORY:  Family history of DVT (Mother)      SOCIAL HISTORY: as noted in HPI    MEDICATIONS (HOME):  Home Medications:  albuterol 90 mcg/inh inhalation aerosol: 2 puff(s) inhaled every 6 hours, As needed, Shortness of Breath and/or Wheezing (29 May 2022 12:36)  Claritin 10 mg oral tablet: 1 tab(s) orally once a day (22 May 2022 17:56)  Flovent 110 mcg/inh inhalation aerosol with adapter: 1 cap(s) inhaled once a day (29 May 2022 12:36)  fluticasone 50 mcg/inh nasal spray: 1 spray(s) nasal 2 times a day (29 May 2022 12:36)  levothyroxine 75 mcg (0.075 mg) oral tablet: 1 tab(s) orally once a day (29 May 2022 12:36)  melatonin 3 mg oral tablet: 1 tab(s) orally once a day (at bedtime) (29 May 2022 12:36)  menthol-benzocaine 3.6 mg-15 mg mucous membrane lozenge: 1 lozenge mucous membrane every 4 hours as needed (29 May 2022 12:36)  mometasone 220 mcg/inh inhalation aerosol powder: 1 puff(s) inhaled once a day (29 May 2022 12:36)  Multiple Vitamins with Iron oral tablet: 1 tab(s) orally once a day (2022 17:46)  olopatadine 0.1% ophthalmic solution: 1 drop(s) to each affected eye every 6 hours, As Needed (2022 17:46)  pantoprazole 40 mg oral delayed release tablet: 1 tab(s) orally once a day (before a meal) (26 May 2022 11:19)  Tylenol 325 mg oral tablet: 2 tab(s) orally every 6 hours, As Needed (29 May 2022 12:36)  Vitamin D2 1.25 mg (50,000 intl units) oral capsule: 1 cap(s) orally once a week (29 May 2022 12:36)    MEDICATIONS  (STANDING):    MEDICATIONS  (PRN):    ALLERGIES/INTOLERANCES:  Allergies  latex (Blisters)  morphine (Unknown)  penicillin (Hives)  penicillin (Other)  pineapple (Unknown)  Toradol (Rash)  Toradol (Unknown)  Zofran (Hives)  Zofran (Unknown)    Intolerances    VITALS & EXAMINATION:  Vital Signs Last 24 Hrs  T(C): 36.9 (2022 02:31), Max: 37.1 (31 May 2022 21:23)  T(F): 98.4 (2022 02:31), Max: 98.7 (31 May 2022 21:23)  HR: 72 (2022 05:30) (72 - 112)  BP: 104/61 (2022 05:30) (104/61 - 141/89)  BP(mean): 74 (2022 05:30) (74 - 74)  RR: 20 (2022 05:30) (17 - 20)  SpO2: 100% (2022 05:30) (97% - 100%)    General:  Constitutional: Obese Female, appears stated age, in no apparent distress including pain  Head: Normocephalic & atraumatic.    Neurological (>12):  MS: Eyes closed. Opens upon repeated prompting but soon closes. Somnolent. Follows simple one step commands. Trouble continuing conversations or answering orientation questions.    Language: Speech is clear, fluent with good comprehension    CNs: PERRL (R = 3mm, L = 3mm). VFF. EOMI no nystagmus, no diplopia. V1-3 intact to LT, well developed masseter muscles b/l. No facial asymmetry b/l, full eye closure strength b/l. Hearing grossly normal (rubbing fingers) b/l. Head turning & shoulder shrug intact b/l. Tongue midline, normal movements, no atrophy.    Motor: Normal muscle bulk & tone. No noticeable tremor or seizure. No pronator drift. Limited neuro exam given sedated effects. However at least 4/5 strength on b/l hand .     Sensation: Intact to LT b/l throughout.     Cortical: Extinction on DSS (neglect): none    Reflexes:              Biceps(C5)       BR(C6)     Triceps(C7)               Patellar(L4)    Achilles(S1)    Plantar Resp  R	2	          2	             2		        2		    2		Down   L	2	          2	             2		        2		    2		Down     Coordination: Cannot participate    Gait: Deferred    LABORATORY:  CBC                       11.0   5.94  )-----------( 145      ( 2022 01:00 )             33.8     Chem 06-    139  |  104  |  14  ----------------------------<  95  4.3   |  22  |  0.78    Ca    9.0      2022 01:00    TPro  6.9  /  Alb  3.8  /  TBili  <0.2  /  DBili  x   /  AST  21  /  ALT  28  /  AlkPhos  61  06-01    LFTs LIVER FUNCTIONS - ( 2022 01:00 )  Alb: 3.8 g/dL / Pro: 6.9 g/dL / ALK PHOS: 61 U/L / ALT: 28 U/L / AST: 21 U/L / GGT: x           Coagulopathy PT/INR - ( 2022 01:00 )   PT: 12.9 sec;   INR: 1.11 ratio         PTT - ( 2022 01:00 )  PTT:31.3 sec  Lipid Panel   A1c   Cardiac enzymes     U/A Urinalysis Basic - ( 2022 00:50 )    Color: Yellow / Appearance: Slightly Turbid / S.028 / pH: x  Gluc: x / Ketone: Negative  / Bili: Negative / Urobili: <2 mg/dL   Blood: x / Protein: 30 mg/dL / Nitrite: Negative   Leuk Esterase: Negative / RBC: 48 /HPF / WBC 11 /HPF   Sq Epi: x / Non Sq Epi: 12 /HPF / Bacteria: Few      CSF  Immunological  Other    STUDIES & IMAGING:  Studies (EKG, EEG, EMG, etc):     Radiology (XR, CT, MR, U/S, TTE/DONOVAN):  **EEG SUMMARY/CLASSIFICATION**    Abnormal EEG in a sedated patient.  - Background slowing, generalized.  - Diffuse excess beta activity.  __________________________________________________________  **EEG IMPRESSION/CLINICAL CORRELATE**    Abnormal EEG study.    - Mild to Moderate nonspecific diffuse or multifocal cerebral dysfunction -- which may be due to sedation.  - Excess diffuse beta activity may be seen with benzodiazepines or sedative medications use.  - No epileptiform patterns or seizures recorded.

## 2022-06-01 NOTE — ED PROVIDER NOTE - PROGRESS NOTE DETAILS
Sign out follow-up: Patient developed tonic-clonic seizure with extensor posturing of hands. Episode lasted about 5 minutes and resolved after 4 mg IV ativan. Pt moved to Trauma A. 1g Keppra given. FALKOWSKA. Suresh Moss MD MBA (EM Fellow): Jt Marte is a 35yo F w/ PMHc of GERD, DVT, Factor V Leiden, Bipolar Disporder, SI w/ multiplt attempts arriving today with abdominal pain and nausea. This patient was signed out during routine sign out by my colleague, Dr. Lim; please refer to his note for further information regarding this patient's initial presentation and workup. In brief, patient arrived with diffuse abdominal pain. CBC/CMP reassuring, UA unremarkable. She was initially going to be discharged but had a documented 5 minute tonic clonic seizure. Ativan and Depakote was given; she has an extensive history of documented seizures, negative EEG most recently in 5/23. She was intubated at that time 2/2 multiple seizures. She was diagnosed with pharyngitis on 5/29. Tanner PINA: Pt signed out to me by previous team - initially evaluated in intake for abdominal pain with negative work-up.  She had witnessed GTC activity (bilaterally upper ext and body shaking) in intake - rec'd ativan and keppra and moved to trauma room.  Pt with stable VS during episodes, noted to have normal tone, resisting passive movement of arms and positive drop arm test.  Review of chart reveals pt taking depakote at home with h/o medication nonadherence.  Home evening dose of depakote ordered.  Pt also with extensive h/o sz vs pseudoseizure activity with mult neg EEGs and known h/o having seizure activity or agitation upon discharge, multiple code kevin and RRT during hospitalizations.  Episode of seizure activity here is consistent with previous reports and I suspect pseudoseizure activity.  Neuro consulted for AED/med recommendations.  If cleared by neuro, pt is stable for dc back to group home. Suresh Moss MD MBA (EM Fellow): Jt Marte is a 37yo F w/ PMHc of GERD, DVT, Factor V Leiden, Bipolar Disporder, SI w/ multiplt attempts arriving today with abdominal pain and nausea. This patient was signed out during routine sign out by my colleague, Dr. Lim; please refer to his note for further information regarding this patient's initial presentation and workup. In brief, patient arrived with diffuse abdominal pain. CBC/CMP reassuring, UA unremarkable. She was initially going to be discharged but had a documented 5 minute tonic clonic seizure. Ativan and Depakote was given; she has an extensive history of documented seizures, negative EEG most recently in 5/23. She was intubated at that time 2/2 multiple seizures. She was diagnosed with pharyngitis on 5/29. Neurology consulted, recommended continued Depakote and plan for follow up outpatient. Recent HCT on 5/23, 8 days prior, reassuring. Agree to defer HCT given reassuring nature of previous as well as grossly non-focal neurological examination. Tanner PINA: Pt signed out to me by previous team - initially evaluated in intake for abdominal pain with negative work-up.  She had witnessed GTC activity (bilaterally upper ext and body shaking) in intake - rec'd ativan and keppra and moved to trauma room.  Pt with stable VS during episodes, noted to have normal tone, resisting passive movement of arms and positive drop arm test.  Review of chart reveals pt taking depakote at home with h/o medication nonadherence.  Home evening dose of depakote ordered.  Pt also with extensive h/o sz vs pseudoseizure activity with mult neg EEGs and known h/o having seizure activity or agitation upon discharge, multiple code kevin and RRT during hospitalizations.  Episode of seizure activity here is consistent with previous reports and I suspect pseudoseizure activity.  Neuro consulted for AED/med recommendations.  If cleared by neuro, pt is stable for dc back to group home. Spoke at bedside with Neurology attending physician, seizures are of low suspicison. Suresh Moss MD MBA (EM Fellow): Jt Marte is a 37yo F w/ PMHc of GERD, DVT, Factor V Leiden, Bipolar Disporder, SI w/ multiplt attempts arriving today with abdominal pain and nausea. This patient was signed out during routine sign out by my colleague, Dr. Lim; please refer to his note for further information regarding this patient's initial presentation and workup. In brief, patient arrived with diffuse abdominal pain. CBC/CMP reassuring, UA unremarkable. She was initially going to be discharged but had a documented 5 minute tonic clonic seizure. Ativan and Depakote was given; she has an extensive history of documented seizures, negative EEG most recently in 5/23. She was intubated at that time 2/2 multiple seizures. She was diagnosed with pharyngitis on 5/29. Neurology consulted, recommended continued Depakote and plan for follow up outpatient. Recent HCT on 5/23, 8 days prior, reassuring. Agree to defer HCT given reassuring nature of previous as well as grossly non-focal neurological examination. Patient rested and then arose and was speaking and able to ambulate. She reported bleeding and was unsure of whether vaginal or rectal etiology. It appears to have slight clots present, no evidence of melena or hematochezia. Rectal exam performed in presence of bedside female chaperone, non-blood crossly. Appears to be anterior in etiology. HcG reassuring. Hgb at baseline. Discussed with patient the importance of prompt follow up and return to ED with any worsening bleeding, chest pain, or other worrisome symptoms. All questions answered and the patient was discharged in stable condition. Suresh Moss MD MBA (EM Fellow): Jt Marte is a 35yo F w/ PMHc of GERD, DVT, Factor V Leiden, Bipolar Disporder, SI w/ multiplt attempts arriving today with abdominal pain and nausea. This patient was signed out during routine sign out by my colleague, Dr. Lim; please refer to his note for further information regarding this patient's initial presentation and workup. In brief, patient arrived with diffuse abdominal pain. CBC/CMP reassuring, UA unremarkable. She was initially going to be discharged but had a documented 5 minute tonic clonic seizure. Ativan and Depakote was given; she has an extensive history of documented seizures, negative EEG most recently in 5/23. She was intubated at that time 2/2 multiple seizures. She was diagnosed with pharyngitis on 5/29. Neurology consulted, recommended continued Depakote and plan for follow up outpatient. Recent HCT on 5/23, 8 days prior, reassuring. Agree to defer HCT given reassuring nature of previous as well as grossly non-focal neurological examination. Patient rested and then arose and was speaking and able to ambulate. She reported bleeding and was unsure of whether vaginal or rectal etiology. It appears to have slight clots present, no evidence of melena or hematochezia. Rectal exam performed in presence of bedside female chaperone, non-blood crossly. Appears to be anterior in etiology. HcG reassuring. Hgb at baseline. Discussed with patient the importance of prompt follow up and return to ED with any worsening bleeding, chest pain, or other worrisome symptoms. Patient was able to eat and drink and requested benadryl for pruritis. There was no evidence of rash, facial swelling, wheezing. I discussed with patient and her mother (via Facetime) my concern of IV benadryl given patient was sleepy 2/2 medicine we previously gave her. All questions answered and the patient was discharged in stable condition. Tanner PINA: Pt signed out to me by previous team - initially evaluated in intake for abdominal pain with negative work-up.  She had witnessed GTC activity (bilaterally upper ext and body shaking) in intake - rec'd ativan and keppra and moved to trauma room.  Pt with stable VS during episodes, noted to have normal tone, resisting passive movement of arms and positive drop arm test.  Review of chart reveals pt taking depakote at home with h/o medication nonadherence.  Home evening dose of depakote ordered.  Pt also with extensive h/o sz vs pseudoseizure activity with mult neg EEGs and known h/o having seizure activity or agitation upon discharge, multiple code kevin and RRT during hospitalizations.  Episode of seizure activity here is consistent with previous reports and I suspect pseudoseizure activity.  Neuro consulted for AED/med recommendations.  If cleared by neuro, pt is stable for dc back to group home. Spoke at bedside with Neurology attending physician, seizures are of low suspicion. Suresh Moss MD MBA (EM Fellow): Jt Marte is a 35yo F w/ PMHc of GERD, DVT, Factor V Leiden, Bipolar Disporder, SI w/ multiplt attempts arriving today with abdominal pain and nausea. This patient was signed out during routine sign out by my colleague, Dr. Lim; please refer to his note for further information regarding this patient's initial presentation and workup. In brief, patient arrived with diffuse abdominal pain. CBC/CMP reassuring, UA unremarkable. She was initially going to be discharged but had a documented 5 minute tonic clonic seizure. Ativan and Depakote was given; she has an extensive history of documented seizures, negative EEG most recently in 5/23. She was intubated at that time 2/2 multiple seizures. She was diagnosed with pharyngitis on 5/29. Neurology consulted, recommended continued Depakote and plan for follow up outpatient. Recent HCT on 5/23, 8 days prior, reassuring. Agree to defer HCT given reassuring nature of previous as well as grossly non-focal neurological examination. Patient rested and then arose and was speaking and able to ambulate. She reported bleeding and was unsure of whether vaginal or rectal etiology. It appears to have slight clots present, no evidence of melena or hematochezia. Rectal exam performed in presence of bedside female chaperone, non-blood crossly. Appears to be anterior in etiology. HcG reassuring. Hgb at baseline. Discussed with patient the importance of prompt follow up and return to ED with any worsening bleeding, chest pain, or other worrisome symptoms. Patient was able to eat and drink and requested benadryl for pruritis. There was no evidence of rash, facial swelling, wheezing. I discussed with patient and her mother (via Facetime) my concern of IV benadryl given patient was sleepy 2/2 medicine we previously gave her. However, we did provide 1x dose of benadryl, IV, to facilitate treatment of her pruritis. Patient was reporting inability to All questions answered and the patient was discharged in stable condition. SE Carpenter- pt moved here from main area after her discharge. She became restless, acting out, unable to follow directions, exhibits signs of hurting others, medication ordered will reassess. NP Bereczky- restraint applied at 1500 and discontinued at 1530, pt calm cooperative, talking, feels better, no signs of aggression. Transport arranged by [place].

## 2022-06-01 NOTE — ED ADULT NURSE REASSESSMENT NOTE - NS ED NURSE REASSESS COMMENT FT1
Patient on capnography as per order, BP low but MD made aware and IV fluid infusing well. Patient is lethargic, but arousable, follows command, knows her name. Patient on cardiac monitor NSR O2 sat 100% on a nasal cannula non labored breathing. No active seizure noted. Side rails up and seizure precaution in place. Safety maintained.
Received patient from intake brought into [facility] for seizure. Patient was put on cardiac monitor w/non rebreather mask O2 [id number] NSR. Patient was medicated as per MD's order. Labs obtained. Side rails up and seizure precaution in place.
patient alert and started to become naled and crying out loud and hitting her head to the wall, hitting her head with her arms and states she does not want to leave. patient  got out of bed  and trying to run away. patient moved to  for safety while awaiting on transport. SW is aware.
pt in intake hallway began having seizure. Placed on side, put on monitor, NRB 15L placed on pt, Ativan pushed. MD March at bedside. Charge RN called. Transferred to [facility].
patient returned from [facility]. blood noted on underwear. denies any new complaints. MD aware. awaiting on dispo.

## 2022-06-01 NOTE — CONSULT NOTE ADULT - ASSESSMENT
36 y.o. F with PMH of obesity, Factor V Leiden Deficiency, seizure, pseudoseizure, DVT/PE on xarelto, endometriosis, asthma presented to the ED for abdominal pain and nausea. Neurology consulted after 4 min episode of seizure like activity observed to be involving b/l extremities. Was coming in with abdominal pain and was about to get discharged. Neuro exam showing somnolence but nonfocal. Given 20 mg bentyl x1, haldol 2.5 mg IV x1, keppra 1g x1, ativan 2 mg x2, VPA 1g x1. Lab significant with few bacteria, neg LE, neg nitrite, large blood.    Impression: Focal to generalized vs generalized seizure with impaired awareness of unclear etiology. Could be due to breakthrough seizure (prior report of noncompliance) vs pseudoseizures (gets before getting discharged, preceded by abdominal pain) vs provoked factors but less likely to be infectious vs toxic metabolic    Recommendations:  [] F/u VPA level  [] C/w home VPA 1500 mg qd  [] Neurocheck and vital per unit protocol  [] if patient has a convulsion, please document accurately the length of the episode, and specifically what the patient was doing paying attention to eye opening vs closure, gaze deviation, shaking of extremities, tongue bite, urinary incontinence, any derangement of vital signs  [] C/w follow up with Dr. Byers as outpatient    Case to be discussed with epilepsy fellow for further recs  Case to be seen and discussed with general neurology attending    36 y.o. F with PMH of obesity, Factor V Leiden Deficiency, seizure, pseudoseizure, DVT/PE on xarelto, endometriosis, asthma presented to the ED for abdominal pain and nausea. Neurology consulted after 4 min episode of seizure like activity observed to be involving b/l extremities. Was coming in with abdominal pain and was about to get discharged. Neuro exam showing somnolence but nonfocal. Given 20 mg bentyl x1, haldol 2.5 mg IV x1, keppra 1g x1, ativan 2 mg x2, VPA 1g x1. Lab significant with few bacteria, neg LE, neg nitrite, large blood. Recent routine eeg but done when intubated showing diffuse beta activity    Impression: Focal to generalized vs generalized seizure with impaired awareness of unclear etiology. Could be due to breakthrough seizure (prior report of noncompliance) vs pseudoseizures (gets before getting discharged, preceded by abdominal pain) vs provoked factors but less likely to be infectious vs toxic metabolic    Recommendations:  [] F/u VPA level  [] C/w home VPA 1500 mg qd  [] Neurocheck and vital per unit protocol  [] if patient has a convulsion, please document accurately the length of the episode, and specifically what the patient was doing paying attention to eye opening vs closure, gaze deviation, shaking of extremities, tongue bite, urinary incontinence, any derangement of vital signs  [] C/w follow up with Dr. Byers as outpatient    Case to be discussed with epilepsy fellow for further recs  Case to be seen and discussed with general neurology attending    36 y.o. F with PMH of obesity, Factor V Leiden Deficiency, seizure, pseudoseizure, DVT/PE on xarelto, endometriosis, asthma presented to the ED for abdominal pain and nausea. Neurology consulted after 4 min episode of seizure like activity observed to be involving b/l extremities. Was coming in with abdominal pain and was about to get discharged. Neuro exam showing somnolence but nonfocal. Given 20 mg bentyl x1, haldol 2.5 mg IV x1, keppra 1g x1, ativan 2 mg x2, VPA 1g x1. Lab significant with few bacteria, neg LE, neg nitrite, large blood. Recent routine eeg 5/23 but done when intubated showing diffuse beta activity. Recent outpatient routine eeg in 3/2022 showed normal study.    Impression: Focal to generalized vs generalized seizure with impaired awareness of unclear etiology. Could be due to breakthrough seizure (prior report of noncompliance) vs pseudoseizures (gets before getting discharged, preceded by abdominal pain) vs provoked factors but less likely to be infectious vs toxic metabolic    Recommendations:  [] F/u VPA level  [] C/w home VPA 1500 mg qd  [] Neurocheck and vital per unit protocol  [] if patient has a convulsion, please document accurately the length of the episode, and specifically what the patient was doing paying attention to eye opening vs closure, gaze deviation, shaking of extremities, tongue bite, urinary incontinence, any derangement of vital signs  [] C/w follow up with Dr. Byers as outpatient    Case to be discussed with epilepsy fellow for further recs  Case to be seen and discussed with general neurology attending    36 y.o. F with PMH of obesity, Factor V Leiden Deficiency, seizure, pseudoseizure, DVT/PE on xarelto, endometriosis, asthma presented to the ED for abdominal pain and nausea. Neurology consulted after 4 min episode of seizure like activity observed to be involving b/l extremities. Was coming in with abdominal pain and was about to get discharged. Neuro exam showing somnolence but nonfocal. Given 20 mg bentyl x1, haldol 2.5 mg IV x1, keppra 1g x1, ativan 2 mg x2, VPA 1g x1. Lab significant with few bacteria, neg LE, neg nitrite, large blood. Recent routine eeg 5/23 but done when intubated showing diffuse beta activity. Recent outpatient routine eeg in 3/2022 showed normal study.    Impression: Focal to generalized vs generalized seizure with impaired awareness of unclear etiology. Could be due to breakthrough seizure (prior report of noncompliance) vs pseudoseizures (gets before getting discharged, preceded by abdominal pain) vs provoked factors but less likely to be infectious vs toxic metabolic    Recommendations:  [] Await until return to baseline mental status  [] No further neurologic work up indicated at this time  [] Should continue home VPA 1500 mg qd   [] F/u VPA level  [] Neurocheck and vital per unit protocol  [] If patient has a convulsion, please document accurately the length of the episode, and specifically what the patient was doing paying attention to eye opening vs closure, gaze deviation, shaking of extremities, tongue bite, urinary incontinence, any derangement of vital signs  [] C/w follow up with Dr. Byers as outpatient    Case discussed with epilepsy fellow Dr. Yap  Case to be seen and discussed with general neurology attending    36 y.o. F with PMH of obesity, Factor V Leiden Deficiency, seizure, pseudoseizure, DVT/PE on xarelto, endometriosis, asthma presented to the ED for abdominal pain and nausea. Neurology consulted after 4 min episode of seizure like activity observed to be involving b/l extremities. Was coming in with abdominal pain and was about to get discharged. Neuro exam showing somnolence but nonfocal. Given 20 mg bentyl x1, haldol 2.5 mg IV x1, keppra 1g x1, ativan 2 mg x2, VPA 1g x1. Lab significant with few bacteria, neg LE, neg nitrite, large blood. Recent routine eeg 5/23 but done when intubated showing diffuse beta activity. Recent outpatient routine eeg in 3/2022 showed normal study.    Impression: Focal to generalized vs generalized seizure with impaired awareness of unclear etiology. Could be due to breakthrough seizure (prior report of noncompliance) vs pseudoseizures (gets before getting discharged, preceded by abdominal pain) vs provoked factors but less likely to be infectious vs toxic metabolic    Recommendations:  [] Await until return to baseline mental status  [] No further neurologic work up indicated at this time  [] Should continue home VPA 1500 mg qd   [] F/u VPA level  [] Neurocheck and vital per unit protocol  [] If patient has a convulsion, please document accurately the length of the episode, and specifically what the patient was doing paying attention to eye opening vs closure, gaze deviation, shaking of extremities, tongue bite, urinary incontinence, any derangement of vital signs  [] C/w follow up with Dr. Byers as outpatient    Case discussed with epilepsy fellow Dr. Yap  Case to be seen and discussed with general neurology attending       NEUROLOGY ATTENDING ADDENDUM    I personally interviewed, examined, and participated in the care of this patient, on rounds 6/1/22.  Reviewed findings and management plan with ED physicians and the neurology consult service team.  History and findings as reported above.  Still somewhat somnolent when seen in early afternoon.  I agree with the above findings, assessment, and recommendations.

## 2022-06-01 NOTE — PROVIDER CONTACT NOTE (OTHER) - ASSESSMENT
Per Carol, pt is able to return to her residence [facility] at time of DC (SW confirmed address: [address], [place] [address]). Pt travels via ambulance with EMS supervision. SW remains available.

## 2022-06-01 NOTE — ED PROVIDER NOTE - ATTENDING CONTRIBUTION TO CARE
History and physical above obtained and documented (or dictated) by me (attending physician).  Resident or ACP and/or medical student involved in case for assistance with disposition and may document involvement as necessary via progress note(s).   -Dr. Krishnamurthy

## 2022-06-01 NOTE — ED PROVIDER NOTE - CLINICAL SUMMARY MEDICAL DECISION MAKING FREE TEXT BOX
35yo F w/ pmh/PPH as above, p/w hematochezia and abd discomfort. No concern for surgical pathology at this time. Labs, meds, reassess for +/- imaging.

## 2022-06-01 NOTE — ED PROVIDER NOTE - NSFOLLOWUPINSTRUCTIONS_ED_ALL_ED_FT
As we discussed, please report back to the ED with any worsening bleeding, shortness of breath, or other worrisome symptoms.

## 2022-06-01 NOTE — ED PROVIDER NOTE - OBJECTIVE STATEMENT
Pertinent PMH/PSH/FHx/SHx and Review of Systems contained within:  35yo F w/ PPH of schizoaffective d/o, bipolar d/o, prior suicide attempts; pmh inclusive of htn, asthma, endometriosis, drug abuse, Factor V Leiden deficiency complicated by multiple dvts/PEs (states was on xarelto for years but changed to eliquis last month), presents c/o diffuse abd discomfort a/w nausea x 1 day (began after [place] all day in beach water) and 1 episode of blood streak on toilet paper when wiping earlier today while having bowel movement which pt describes as soft and brown.  Denies dark or watery stools.  Also reporting that she has been feeling burning sensation in anus when having BMs for months. No known hx of fissures or hemorrhoids.     No fever/chills, No photophobia/eye pain/changes in vision, No ear pain/sore throat/dysphagia, No chest pain/palpitations, no SOB/cough/wheeze/stridor, No V/D, no dysuria/frequency/discharge, No neck/back pain, no rash, no new focal neuro symptoms.

## 2022-06-01 NOTE — PROVIDER CONTACT NOTE (OTHER) - BACKGROUND
Pt in ED and from [facility]. SW contacted [facility] and spoke with coordinator Carol (p:[phone]).

## 2022-06-01 NOTE — ED PROVIDER NOTE - PHYSICAL EXAMINATION
Gen: Alert, NAD  Head: NC, AT,  EOMI, normal lids/conjunctiva  ENT:  normal hearing, patent oropharynx without erythema/exudate  Neck: +supple, no tenderness/meningismus/JVD, +Trachea midline  Chest: no chest wall tenderness, equal chest rise  Pulm: Bilateral BS, normal resp effort, no wheeze/stridor/retractions  CV: RRR, no M/R/G, +dist pulses  Abd: +BS, soft, ND, mild diffuse ttp, no rebound/guarding  Rectal: see resident progress note  Mskel: no edema/erythema/cyanosis  Skin: no rash  Neuro: AAOx3

## 2022-06-03 ENCOUNTER — EMERGENCY (EMERGENCY)
Facility: HOSPITAL | Age: 37
LOS: 0 days | Discharge: HOME | End: 2022-06-04
Attending: STUDENT IN AN ORGANIZED HEALTH CARE EDUCATION/TRAINING PROGRAM
Payer: MEDICARE

## 2022-06-03 VITALS
RESPIRATION RATE: 17 BRPM | HEART RATE: 107 BPM | DIASTOLIC BLOOD PRESSURE: 78 MMHG | OXYGEN SATURATION: 97 % | HEIGHT: 68 IN | TEMPERATURE: 98 F | WEIGHT: 259.93 LBS | SYSTOLIC BLOOD PRESSURE: 97 MMHG

## 2022-06-03 DIAGNOSIS — R10.9 UNSPECIFIED ABDOMINAL PAIN: ICD-10-CM

## 2022-06-03 DIAGNOSIS — G40.909 EPILEPSY, UNSPECIFIED, NOT INTRACTABLE, WITHOUT STATUS EPILEPTICUS: ICD-10-CM

## 2022-06-03 DIAGNOSIS — J45.909 UNSPECIFIED ASTHMA, UNCOMPLICATED: ICD-10-CM

## 2022-06-03 DIAGNOSIS — F25.9 SCHIZOAFFECTIVE DISORDER, UNSPECIFIED: ICD-10-CM

## 2022-06-03 DIAGNOSIS — Z88.6 ALLERGY STATUS TO ANALGESIC AGENT: ICD-10-CM

## 2022-06-03 DIAGNOSIS — Z86.718 PERSONAL HISTORY OF OTHER VENOUS THROMBOSIS AND EMBOLISM: ICD-10-CM

## 2022-06-03 DIAGNOSIS — M79.10 MYALGIA, UNSPECIFIED SITE: ICD-10-CM

## 2022-06-03 DIAGNOSIS — Z91.040 LATEX ALLERGY STATUS: ICD-10-CM

## 2022-06-03 DIAGNOSIS — F63.9 IMPULSE DISORDER, UNSPECIFIED: ICD-10-CM

## 2022-06-03 DIAGNOSIS — Z88.0 ALLERGY STATUS TO PENICILLIN: ICD-10-CM

## 2022-06-03 DIAGNOSIS — Z91.018 ALLERGY TO OTHER FOODS: ICD-10-CM

## 2022-06-03 DIAGNOSIS — F31.9 BIPOLAR DISORDER, UNSPECIFIED: ICD-10-CM

## 2022-06-03 DIAGNOSIS — E03.9 HYPOTHYROIDISM, UNSPECIFIED: ICD-10-CM

## 2022-06-03 DIAGNOSIS — R45.6 VIOLENT BEHAVIOR: ICD-10-CM

## 2022-06-03 DIAGNOSIS — R45.851 SUICIDAL IDEATIONS: ICD-10-CM

## 2022-06-03 DIAGNOSIS — Z88.8 ALLERGY STATUS TO OTHER DRUGS, MEDICAMENTS AND BIOLOGICAL SUBSTANCES STATUS: ICD-10-CM

## 2022-06-03 DIAGNOSIS — Z79.01 LONG TERM (CURRENT) USE OF ANTICOAGULANTS: ICD-10-CM

## 2022-06-03 DIAGNOSIS — F79 UNSPECIFIED INTELLECTUAL DISABILITIES: ICD-10-CM

## 2022-06-03 DIAGNOSIS — Z20.822 CONTACT WITH AND (SUSPECTED) EXPOSURE TO COVID-19: ICD-10-CM

## 2022-06-03 LAB
ALBUMIN SERPL ELPH-MCNC: 3.3 G/DL — SIGNIFICANT CHANGE UP (ref 3.3–5)
ALP SERPL-CCNC: 48 U/L — SIGNIFICANT CHANGE UP (ref 40–120)
ALT FLD-CCNC: 32 U/L — SIGNIFICANT CHANGE UP (ref 12–78)
ANION GAP SERPL CALC-SCNC: 7 MMOL/L — SIGNIFICANT CHANGE UP (ref 5–17)
APPEARANCE UR: ABNORMAL
AST SERPL-CCNC: 23 U/L — SIGNIFICANT CHANGE UP (ref 15–37)
BACTERIA # UR AUTO: ABNORMAL
BASOPHILS # BLD AUTO: 0.01 K/UL — SIGNIFICANT CHANGE UP (ref 0–0.2)
BASOPHILS NFR BLD AUTO: 0.1 % — SIGNIFICANT CHANGE UP (ref 0–2)
BILIRUB SERPL-MCNC: 0.3 MG/DL — SIGNIFICANT CHANGE UP (ref 0.2–1.2)
BILIRUB UR-MCNC: NEGATIVE — SIGNIFICANT CHANGE UP
BUN SERPL-MCNC: 9 MG/DL — SIGNIFICANT CHANGE UP (ref 7–23)
CALCIUM SERPL-MCNC: 9 MG/DL — SIGNIFICANT CHANGE UP (ref 8.5–10.1)
CHLORIDE SERPL-SCNC: 108 MMOL/L — SIGNIFICANT CHANGE UP (ref 96–108)
CO2 SERPL-SCNC: 26 MMOL/L — SIGNIFICANT CHANGE UP (ref 22–31)
COLOR SPEC: YELLOW — SIGNIFICANT CHANGE UP
CREAT SERPL-MCNC: 0.76 MG/DL — SIGNIFICANT CHANGE UP (ref 0.5–1.3)
DIFF PNL FLD: ABNORMAL
EGFR: 105 ML/MIN/1.73M2 — SIGNIFICANT CHANGE UP
EOSINOPHIL # BLD AUTO: 0.01 K/UL — SIGNIFICANT CHANGE UP (ref 0–0.5)
EOSINOPHIL NFR BLD AUTO: 0.1 % — SIGNIFICANT CHANGE UP (ref 0–6)
EPI CELLS # UR: SIGNIFICANT CHANGE UP
ETHANOL SERPL-MCNC: <10 MG/DL — SIGNIFICANT CHANGE UP (ref 0–10)
GLUCOSE SERPL-MCNC: 78 MG/DL — SIGNIFICANT CHANGE UP (ref 70–99)
GLUCOSE UR QL: NEGATIVE MG/DL — SIGNIFICANT CHANGE UP
HCG SERPL-ACNC: <1 MIU/ML — SIGNIFICANT CHANGE UP
HCT VFR BLD CALC: 34 % — LOW (ref 34.5–45)
HGB BLD-MCNC: 11.1 G/DL — LOW (ref 11.5–15.5)
IMM GRANULOCYTES NFR BLD AUTO: 0.2 % — SIGNIFICANT CHANGE UP (ref 0–1.5)
KETONES UR-MCNC: NEGATIVE — SIGNIFICANT CHANGE UP
LACTATE SERPL-SCNC: 0.9 MMOL/L — SIGNIFICANT CHANGE UP (ref 0.7–2)
LEUKOCYTE ESTERASE UR-ACNC: NEGATIVE — SIGNIFICANT CHANGE UP
LIDOCAIN IGE QN: 85 U/L — SIGNIFICANT CHANGE UP (ref 73–393)
LYMPHOCYTES # BLD AUTO: 1.58 K/UL — SIGNIFICANT CHANGE UP (ref 1–3.3)
LYMPHOCYTES # BLD AUTO: 18.6 % — SIGNIFICANT CHANGE UP (ref 13–44)
MCHC RBC-ENTMCNC: 30.1 PG — SIGNIFICANT CHANGE UP (ref 27–34)
MCHC RBC-ENTMCNC: 32.6 G/DL — SIGNIFICANT CHANGE UP (ref 32–36)
MCV RBC AUTO: 92.1 FL — SIGNIFICANT CHANGE UP (ref 80–100)
MONOCYTES # BLD AUTO: 0.82 K/UL — SIGNIFICANT CHANGE UP (ref 0–0.9)
MONOCYTES NFR BLD AUTO: 9.6 % — SIGNIFICANT CHANGE UP (ref 2–14)
NEUTROPHILS # BLD AUTO: 6.07 K/UL — SIGNIFICANT CHANGE UP (ref 1.8–7.4)
NEUTROPHILS NFR BLD AUTO: 71.4 % — SIGNIFICANT CHANGE UP (ref 43–77)
NITRITE UR-MCNC: NEGATIVE — SIGNIFICANT CHANGE UP
NRBC # BLD: 0 /100 WBCS — SIGNIFICANT CHANGE UP (ref 0–0)
PH UR: 6.5 — SIGNIFICANT CHANGE UP (ref 5–8)
PLATELET # BLD AUTO: 131 K/UL — LOW (ref 150–400)
POTASSIUM SERPL-MCNC: 4.5 MMOL/L — SIGNIFICANT CHANGE UP (ref 3.5–5.3)
POTASSIUM SERPL-SCNC: 4.5 MMOL/L — SIGNIFICANT CHANGE UP (ref 3.5–5.3)
PROT SERPL-MCNC: 7.2 GM/DL — SIGNIFICANT CHANGE UP (ref 6–8.3)
PROT UR-MCNC: 15 MG/DL
RAPID RVP RESULT: SIGNIFICANT CHANGE UP
RBC # BLD: 3.69 M/UL — LOW (ref 3.8–5.2)
RBC # FLD: 15.2 % — HIGH (ref 10.3–14.5)
RBC CASTS # UR COMP ASSIST: >50 /HPF (ref 0–4)
SARS-COV-2 RNA SPEC QL NAA+PROBE: SIGNIFICANT CHANGE UP
SODIUM SERPL-SCNC: 141 MMOL/L — SIGNIFICANT CHANGE UP (ref 135–145)
SP GR SPEC: 1 — LOW (ref 1.01–1.02)
UROBILINOGEN FLD QL: NEGATIVE MG/DL — SIGNIFICANT CHANGE UP
VALPROATE SERPL-MCNC: 99 UG/ML — SIGNIFICANT CHANGE UP (ref 50–100)
WBC # BLD: 8.51 K/UL — SIGNIFICANT CHANGE UP (ref 3.8–10.5)
WBC # FLD AUTO: 8.51 K/UL — SIGNIFICANT CHANGE UP (ref 3.8–10.5)
WBC UR QL: SIGNIFICANT CHANGE UP

## 2022-06-03 PROCEDURE — 99285 EMERGENCY DEPT VISIT HI MDM: CPT

## 2022-06-03 PROCEDURE — 74176 CT ABD & PELVIS W/O CONTRAST: CPT | Mod: 26,MA

## 2022-06-03 PROCEDURE — 90792 PSYCH DIAG EVAL W/MED SRVCS: CPT | Mod: 95

## 2022-06-03 RX ORDER — SODIUM CHLORIDE 9 MG/ML
1000 INJECTION INTRAMUSCULAR; INTRAVENOUS; SUBCUTANEOUS ONCE
Refills: 0 | Status: COMPLETED | OUTPATIENT
Start: 2022-06-03 | End: 2022-06-03

## 2022-06-03 RX ORDER — LIDOCAINE 4 G/100G
10 CREAM TOPICAL ONCE
Refills: 0 | Status: COMPLETED | OUTPATIENT
Start: 2022-06-03 | End: 2022-06-03

## 2022-06-03 RX ORDER — DIPHENHYDRAMINE HCL 50 MG
25 CAPSULE ORAL ONCE
Refills: 0 | Status: COMPLETED | OUTPATIENT
Start: 2022-06-03 | End: 2022-06-03

## 2022-06-03 RX ORDER — FAMOTIDINE 10 MG/ML
20 INJECTION INTRAVENOUS ONCE
Refills: 0 | Status: COMPLETED | OUTPATIENT
Start: 2022-06-03 | End: 2022-06-03

## 2022-06-03 RX ORDER — HALOPERIDOL DECANOATE 100 MG/ML
5 INJECTION INTRAMUSCULAR ONCE
Refills: 0 | Status: COMPLETED | OUTPATIENT
Start: 2022-06-03 | End: 2022-06-03

## 2022-06-03 RX ORDER — CYCLOBENZAPRINE HYDROCHLORIDE 10 MG/1
5 TABLET, FILM COATED ORAL ONCE
Refills: 0 | Status: COMPLETED | OUTPATIENT
Start: 2022-06-03 | End: 2022-06-03

## 2022-06-03 RX ADMIN — Medication 2 MILLIGRAM(S): at 21:52

## 2022-06-03 RX ADMIN — FAMOTIDINE 20 MILLIGRAM(S): 10 INJECTION INTRAVENOUS at 14:08

## 2022-06-03 RX ADMIN — HALOPERIDOL DECANOATE 5 MILLIGRAM(S): 100 INJECTION INTRAMUSCULAR at 21:52

## 2022-06-03 RX ADMIN — CYCLOBENZAPRINE HYDROCHLORIDE 5 MILLIGRAM(S): 10 TABLET, FILM COATED ORAL at 14:08

## 2022-06-03 RX ADMIN — SODIUM CHLORIDE 1000 MILLILITER(S): 9 INJECTION INTRAMUSCULAR; INTRAVENOUS; SUBCUTANEOUS at 14:08

## 2022-06-03 RX ADMIN — Medication 25 MILLIGRAM(S): at 18:45

## 2022-06-03 RX ADMIN — Medication 25 MILLIGRAM(S): at 16:18

## 2022-06-03 NOTE — ED BEHAVIORAL HEALTH ASSESSMENT NOTE - RISK ASSESSMENT
Risk Factors: hx of suicide attempt, chronically limited impulse control   Protective Factors; denies suicidal ideation, able to safety plan Low Acute Suicide Risk

## 2022-06-03 NOTE — ED ADULT NURSE NOTE - NSIMPLEMENTINTERV_GEN_ALL_ED
Implemented All Universal Safety Interventions:  [place] to call system. Call bell, personal items and telephone within reach. Instruct patient to call for assistance. Room bathroom lighting operational. Non-slip footwear when patient is off stretcher. Physically safe environment: no spills, clutter or unnecessary equipment. Stretcher in lowest position, wheels locked, appropriate side rails in place.

## 2022-06-03 NOTE — ED PROVIDER NOTE - PATIENT PORTAL LINK FT
You can access the FollowMyHealth Patient Portal offered by [facility] by registering at the following website: [email or web address]. By joining [employer]’s FollowMyHealth portal, you will also be able to view your health information using other applications (apps) compatible with our system.

## 2022-06-03 NOTE — ED ADULT NURSE NOTE - OBJECTIVE STATEMENT
pt biba received from [facility] home, with c/o flu-like symptoms x 1 week. h/o seizures

## 2022-06-03 NOTE — ED PROVIDER NOTE - PHYSICAL EXAMINATION
VITALS: reviewed  GEN: NAD, A & O x 4  HEAD/EYES: NCAT, EOMI, anicteric sclerae  ENT: mucus membranes moist, oropharynx WNL, trachea midline,  RESP: lungs CTA with equal breath sounds bilaterally, chest wall nontender and atraumatic  CV: heart with reg rhythm S1, S2, no murmur; distal pulses intact and symmetric bilaterally  ABDOMEN: normoactive bowel sounds, soft, nondistended, nontender, no palpable masses  : no CVAT  MSK: extremities atraumatic and nontender, no edema, no asymmetry.  SKIN: warm, dry, no rash, no bruising, no cyanosis. color appropriate for ethnicity  NEURO: alert, mentating appropriately, no facial asymmetry.   PSYCH: Affect appropriate
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
## 2022-06-03 NOTE — ED BEHAVIORAL HEALTH ASSESSMENT NOTE - CURRENT MEDICATION
Percocet 5 mg-325 mg oral tablet: 1 tab(s) orally every 6 hours, As needed, Severe Pain (7 - 10) MDD:4  · 	levothyroxine 75 mcg (0.075 mg) oral tablet: 1 tab(s) orally once a day  · 	pantoprazole 40 mg oral delayed release tablet: 1 tab(s) orally once a day (before a meal)  · 	menthol-benzocaine 3.6 mg-15 mg mucous membrane lozenge: 1 lozenge mucous membrane every 4 hours as needed  · 	albuterol 90 mcg/inh inhalation aerosol: 2 puff(s) inhaled every 6 hours, As needed, Shortness of Breath and/or Wheezing  · 	melatonin 3 mg oral tablet: 1 tab(s) orally once a day (at bedtime)  · 	Ativan 0.5 mg oral tablet: 1 tab(s) orally 2 times a day  [10AM and 4PM] MDD:1mg  · 	SEROquel 200 mg oral tablet: 1 tab(s) orally 2 times a day [8AM and 8PM]  · 	tiZANidine 2 mg oral tablet: 1 tab(s) orally 2 times a day, As Needed -Muscle Spasm    · 	Ala-Maximo 1% topical cream: 1 application topically once  · 	diphenhydrAMINE 25 mg oral capsule: 1 cap(s) orally every 6 hours, As needed, Rash and/or Itching  · 	QUEtiapine 50 mg oral tablet: 1 tab(s) orally once a day at noon  · 	chlorproMAZINE 50 mg oral tablet: 1 tab(s) orally every 6 hours, As needed, agitation/aggression  · 	lurasidone 40 mg oral tablet: 1 tab(s) orally once a day  · 	ARIPiprazole 30 mg oral tablet: 1 tab(s) orally once a day  · 	Depakote  mg oral tablet, extended release: 3 tab(s) orally once a day 500 mg in AM and 1000mg bedtime  · 	Eliquis 5 mg oral tablet: 1 tab(s) orally 2 times a day   · 	mometasone 220 mcg/inh inhalation aerosol powder: 1 puff(s) inhaled once a day  · 	Claritin 10 mg oral tablet: 1 tab(s) orally once a day  · 	Multiple Vitamins with Iron oral tablet: 1 tab(s) orally once a day  · 	fluticasone 50 mcg/inh nasal spray: 1 spray(s) nasal 2 times a day  · 	Tylenol 325 mg oral tablet: 2 tab(s) orally every 6 hours, As Needed  · 	olopatadine 0.1% ophthalmic solution: 1 drop(s) to each affected eye every 6 hours, As Needed  · 	Vitamin D2 1.25 mg (50,000 intl units) oral capsule: 1 cap(s) orally once a week  · 	Flovent 110 mcg/inh inhalation aerosol with adapter: 1 cap(s) inhaled once a day
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
## 2022-06-03 NOTE — ED PROVIDER NOTE - PROGRESS NOTE DETAILS
Geraldo: pt cleared by psych, but started banging her head and her fist against the walls and stating that she will hurt herself if she is discharged. Pt also noted to be aggressive towards ED staff-- does not appear safe for discharge from ED at this time, 5&2 and reassess Patient care endorsed by Dr. Shah, patient given medication for erratic behavior, now asleep, will reassess when awake if psych needs to see her, seen earlier and determined not to need admission for psych.  Labs, CXR, UA findings reviewed. Patient awake, alert, and eating this morning, speaking appropriately and calmly.  UA with bacteria, denies dysuria, she is having her menses.  Will dc back to group home, does not appear to need psych reeval, patient advised to follow up with her physicians. Discussed results and outcome of today's visit with the patient/family, copy of results given with discharge.  Patient advised to arrange vallecillo follow up with their PMD and/or any provided referral(s) within the next few days and are cautioned to return to the Emergency Department for any worsening symptoms.  Patient advised that their doctor may call  to follow up on the specific results of the tests performed today in the emergency department.   Patient appears well on discharge. Patient care endorsed by Dr. Shah, patient given medication for erratic behavior, now asleep, will reassess when awake if psych needs to see her, seen earlier and determined not to need admission for psych.  Labs, CXR, UA findings reviewed.  Recent admission course for seizure reviewed. Patient awake, alert, and eating this morning, speaking appropriately and calmly.  UA with bacteria, denies dysuria, she is having her menses.  Will dc back to group home, does not appear to need psych reeval, patient advised to follow up with her physicians. Looks comfortable, eating sandwich, no indication for US.  Discussed results and outcome of today's visit with the patient/family, copy of results given with discharge.  Patient advised to arrange vallecillo follow up with their PMD and/or any provided referral(s) within the next few days and are cautioned to return to the Emergency Department for any worsening symptoms.  Patient advised that their doctor may call  to follow up on the specific results of the tests performed today in the emergency department.   Patient appears well on discharge. Patient c/o itching to arms, no visible hives or rash, will give PO benadryl.
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
## 2022-06-03 NOTE — ED PROVIDER NOTE - OBJECTIVE STATEMENT
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 Pt reports having seizures earlier this week for which she was intubated.

## 2022-06-03 NOTE — ED ADULT NURSE REASSESSMENT NOTE - NS ED NURSE REASSESS COMMENT FT1
patient very agitated try to leave the ER, talking on the phone " with my friend " and  taking pictures around ED code pierre was call , patient back in the room dinner was served patient calm keep under 1:1 observation

## 2022-06-03 NOTE — ED PROVIDER NOTE - CLINICAL SUMMARY MEDICAL DECISION MAKING FREE TEXT BOX
37 yo F presenting with body aches, abdominal pain-- labs, meds, ct, reassess    Pt admits to SI at 5:30 placed on 1:1, bal sent and psych called
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
## 2022-06-04 VITALS
HEART RATE: 93 BPM | TEMPERATURE: 99 F | DIASTOLIC BLOOD PRESSURE: 80 MMHG | SYSTOLIC BLOOD PRESSURE: 113 MMHG | OXYGEN SATURATION: 99 % | RESPIRATION RATE: 16 BRPM

## 2022-06-04 RX ORDER — DIPHENHYDRAMINE HCL 50 MG
25 CAPSULE ORAL ONCE
Refills: 0 | Status: COMPLETED | OUTPATIENT
Start: 2022-06-04 | End: 2022-06-04

## 2022-06-04 RX ADMIN — Medication 25 MILLIGRAM(S): at 06:47

## 2022-06-05 LAB
CULTURE RESULTS: SIGNIFICANT CHANGE UP
SPECIMEN SOURCE: SIGNIFICANT CHANGE UP

## 2022-06-08 ENCOUNTER — APPOINTMENT (OUTPATIENT)
Dept: NEUROLOGY | Facility: CLINIC | Age: 37
End: 2022-06-08
Payer: MEDICARE

## 2022-06-08 VITALS
WEIGHT: 268 LBS | DIASTOLIC BLOOD PRESSURE: 76 MMHG | SYSTOLIC BLOOD PRESSURE: 108 MMHG | HEART RATE: 109 BPM | BODY MASS INDEX: 40.62 KG/M2 | HEIGHT: 68 IN

## 2022-06-08 PROCEDURE — 99214 OFFICE O/P EST MOD 30 MIN: CPT

## 2022-06-08 NOTE — HISTORY OF PRESENT ILLNESS
[FreeTextEntry1] : cc- seizures\par \par HPI- since last visit I could not reach her psychiatrist.\par Was recently admitted to [facility] twice with seizures, once intubated and a week later for abdominal pain and a ?4 minute seizures. The possibility of LUDY was raised. EEG with beta but no epileptiform per discharge. \par Quetiapine was increased for agitation.\par \par meds- \par albuterol prn\par arapiprazole 30mg/d\par valproate  -1000 bid\par Latuda 40mg/d\par levothyoxine 75mcg/d\par quetiapine -\par topiramate 75 bid\par Eliquis 50mg/d\par synthroid \par lorazeapam 0.5 mg bid\par \par psychiatrist and patient wants to to change her to Thorazine standing and lorazepam prn

## 2022-06-08 NOTE — ASSESSMENT
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 Has been on Tpx and VPA. Having ?auras vs anxiety characterized by abdominal fluttering. Not clear what the cause of he abdominal discomfort. To have endo and colonoscopy. MRI brain c/w likely migraine.\par  Has had two recent admissions to [facility] with seizures but ?of PNES raised. \par - cont present meds until admitted to [facility] for characterization and to taper off VPA\par - RTC after admission\par \par

## 2022-06-08 NOTE — PHYSICAL EXAM
[FreeTextEntry1] : \par Neuro alert and oriented x3\par attn conc lang nl\par NO AH\par STM 3/3\par Cn intact in detail \par Motor 5/5, mild postural tremor\par sens gr nl\par CSG F to n ok\par gait NB steady. \par

## 2022-06-09 ENCOUNTER — INPATIENT (INPATIENT)
Facility: HOSPITAL | Age: 37
LOS: 6 days | Discharge: HOME | End: 2022-06-16
Attending: INTERNAL MEDICINE | Admitting: INTERNAL MEDICINE
Payer: MEDICARE

## 2022-06-09 VITALS
RESPIRATION RATE: 18 BRPM | HEART RATE: 112 BPM | WEIGHT: 259.93 LBS | HEIGHT: 68 IN | DIASTOLIC BLOOD PRESSURE: 86 MMHG | TEMPERATURE: 98 F | SYSTOLIC BLOOD PRESSURE: 127 MMHG | OXYGEN SATURATION: 97 %

## 2022-06-09 PROCEDURE — 10160 PNXR ASPIR ABSC HMTMA BULLA: CPT

## 2022-06-09 PROCEDURE — 99285 EMERGENCY DEPT VISIT HI MDM: CPT | Mod: 25

## 2022-06-09 PROCEDURE — 93010 ELECTROCARDIOGRAM REPORT: CPT

## 2022-06-09 RX ORDER — ACETAMINOPHEN 500 MG
650 TABLET ORAL ONCE
Refills: 0 | Status: COMPLETED | OUTPATIENT
Start: 2022-06-09 | End: 2022-06-10

## 2022-06-09 RX ORDER — METOCLOPRAMIDE HCL 10 MG
10 TABLET ORAL ONCE
Refills: 0 | Status: COMPLETED | OUTPATIENT
Start: 2022-06-09 | End: 2022-06-10

## 2022-06-09 RX ORDER — DIPHENHYDRAMINE HCL 50 MG
50 CAPSULE ORAL ONCE
Refills: 0 | Status: COMPLETED | OUTPATIENT
Start: 2022-06-09 | End: 2022-06-10

## 2022-06-09 NOTE — ED ADULT TRIAGE NOTE - CHIEF COMPLAINT QUOTE
hx of  hypothyroid, seizure, DVT on Eliquis, Factor 5, migraines, GERD, bipolar . PW swelling to RUE 06/03 given abx by MD unknown name states swelling has worsen. PW migraine x 9 days a.w nausea and dizziness. RUE warm to touch , ecchymosis noted in triage. pt denies chest pain and SOB.

## 2022-06-10 DIAGNOSIS — Z86.718 PERSONAL HISTORY OF OTHER VENOUS THROMBOSIS AND EMBOLISM: ICD-10-CM

## 2022-06-10 DIAGNOSIS — L02.413 CUTANEOUS ABSCESS OF RIGHT UPPER LIMB: ICD-10-CM

## 2022-06-10 DIAGNOSIS — F31.9 BIPOLAR DISORDER, UNSPECIFIED: ICD-10-CM

## 2022-06-10 DIAGNOSIS — G40.909 EPILEPSY, UNSPECIFIED, NOT INTRACTABLE, WITHOUT STATUS EPILEPTICUS: ICD-10-CM

## 2022-06-10 DIAGNOSIS — E03.9 HYPOTHYROIDISM, UNSPECIFIED: ICD-10-CM

## 2022-06-10 DIAGNOSIS — D68.51 ACTIVATED PROTEIN C RESISTANCE: ICD-10-CM

## 2022-06-10 LAB
ALBUMIN SERPL ELPH-MCNC: 3.5 G/DL — SIGNIFICANT CHANGE UP (ref 3.3–5)
ALP SERPL-CCNC: 52 U/L — SIGNIFICANT CHANGE UP (ref 40–120)
ALT FLD-CCNC: 42 U/L — SIGNIFICANT CHANGE UP (ref 12–78)
ANION GAP SERPL CALC-SCNC: 6 MMOL/L — SIGNIFICANT CHANGE UP (ref 5–17)
APTT BLD: 30.2 SEC — SIGNIFICANT CHANGE UP (ref 27.5–35.5)
AST SERPL-CCNC: 17 U/L — SIGNIFICANT CHANGE UP (ref 15–37)
BILIRUB SERPL-MCNC: 0.2 MG/DL — SIGNIFICANT CHANGE UP (ref 0.2–1.2)
BUN SERPL-MCNC: 13 MG/DL — SIGNIFICANT CHANGE UP (ref 7–23)
CALCIUM SERPL-MCNC: 9.3 MG/DL — SIGNIFICANT CHANGE UP (ref 8.5–10.1)
CHLORIDE SERPL-SCNC: 107 MMOL/L — SIGNIFICANT CHANGE UP (ref 96–108)
CO2 SERPL-SCNC: 29 MMOL/L — SIGNIFICANT CHANGE UP (ref 22–31)
CREAT SERPL-MCNC: 0.94 MG/DL — SIGNIFICANT CHANGE UP (ref 0.5–1.3)
EGFR: 82 ML/MIN/1.73M2 — SIGNIFICANT CHANGE UP
FLUAV AG NPH QL: SIGNIFICANT CHANGE UP
FLUBV AG NPH QL: SIGNIFICANT CHANGE UP
GLUCOSE SERPL-MCNC: 87 MG/DL — SIGNIFICANT CHANGE UP (ref 70–99)
HCG SERPL-ACNC: <1 MIU/ML — SIGNIFICANT CHANGE UP
HCT VFR BLD CALC: 32.7 % — LOW (ref 34.5–45)
HGB BLD-MCNC: 10.4 G/DL — LOW (ref 11.5–15.5)
INR BLD: 1.12 RATIO — SIGNIFICANT CHANGE UP (ref 0.88–1.16)
LACTATE SERPL-SCNC: 1.1 MMOL/L — SIGNIFICANT CHANGE UP (ref 0.7–2)
MCHC RBC-ENTMCNC: 29.9 PG — SIGNIFICANT CHANGE UP (ref 27–34)
MCHC RBC-ENTMCNC: 31.8 G/DL — LOW (ref 32–36)
MCV RBC AUTO: 94 FL — SIGNIFICANT CHANGE UP (ref 80–100)
NRBC # BLD: 0 /100 WBCS — SIGNIFICANT CHANGE UP (ref 0–0)
PLATELET # BLD AUTO: 189 K/UL — SIGNIFICANT CHANGE UP (ref 150–400)
POTASSIUM SERPL-MCNC: 4.6 MMOL/L — SIGNIFICANT CHANGE UP (ref 3.5–5.3)
POTASSIUM SERPL-SCNC: 4.6 MMOL/L — SIGNIFICANT CHANGE UP (ref 3.5–5.3)
PROT SERPL-MCNC: 7.6 GM/DL — SIGNIFICANT CHANGE UP (ref 6–8.3)
PROTHROM AB SERPL-ACNC: 13.5 SEC — HIGH (ref 10.5–13.4)
RBC # BLD: 3.48 M/UL — LOW (ref 3.8–5.2)
RBC # FLD: 15.7 % — HIGH (ref 10.3–14.5)
SARS-COV-2 RNA SPEC QL NAA+PROBE: SIGNIFICANT CHANGE UP
SODIUM SERPL-SCNC: 142 MMOL/L — SIGNIFICANT CHANGE UP (ref 135–145)
WBC # BLD: 5.38 K/UL — SIGNIFICANT CHANGE UP (ref 3.8–10.5)
WBC # FLD AUTO: 5.38 K/UL — SIGNIFICANT CHANGE UP (ref 3.8–10.5)

## 2022-06-10 PROCEDURE — 76937 US GUIDE VASCULAR ACCESS: CPT | Mod: 26

## 2022-06-10 PROCEDURE — 73201 CT UPPER EXTREMITY W/DYE: CPT | Mod: 26,RT,MA

## 2022-06-10 PROCEDURE — 99222 1ST HOSP IP/OBS MODERATE 55: CPT

## 2022-06-10 PROCEDURE — 36000 PLACE NEEDLE IN VEIN: CPT

## 2022-06-10 PROCEDURE — 73030 X-RAY EXAM OF SHOULDER: CPT | Mod: 26,RT

## 2022-06-10 PROCEDURE — 70450 CT HEAD/BRAIN W/O DYE: CPT | Mod: 26,MA

## 2022-06-10 RX ORDER — APIXABAN 2.5 MG/1
5 TABLET, FILM COATED ORAL EVERY 12 HOURS
Refills: 0 | Status: DISCONTINUED | OUTPATIENT
Start: 2022-06-10 | End: 2022-06-12

## 2022-06-10 RX ORDER — INFLUENZA VIRUS VACCINE 15; 15; 15; 15 UG/.5ML; UG/.5ML; UG/.5ML; UG/.5ML
0.5 SUSPENSION INTRAMUSCULAR ONCE
Refills: 0 | Status: DISCONTINUED | OUTPATIENT
Start: 2022-06-10 | End: 2022-06-16

## 2022-06-10 RX ORDER — LEVOTHYROXINE SODIUM 125 MCG
75 TABLET ORAL DAILY
Refills: 0 | Status: DISCONTINUED | OUTPATIENT
Start: 2022-06-10 | End: 2022-06-16

## 2022-06-10 RX ORDER — CHLORPROMAZINE HCL 10 MG
50 TABLET ORAL ONCE
Refills: 0 | Status: COMPLETED | OUTPATIENT
Start: 2022-06-10 | End: 2022-06-10

## 2022-06-10 RX ORDER — PANTOPRAZOLE SODIUM 20 MG/1
40 TABLET, DELAYED RELEASE ORAL
Refills: 0 | Status: DISCONTINUED | OUTPATIENT
Start: 2022-06-10 | End: 2022-06-16

## 2022-06-10 RX ORDER — DIVALPROEX SODIUM 500 MG/1
1000 TABLET, DELAYED RELEASE ORAL
Refills: 0 | Status: DISCONTINUED | OUTPATIENT
Start: 2022-06-10 | End: 2022-06-16

## 2022-06-10 RX ORDER — ALBUTEROL 90 UG/1
2 AEROSOL, METERED ORAL EVERY 6 HOURS
Refills: 0 | Status: DISCONTINUED | OUTPATIENT
Start: 2022-06-10 | End: 2022-06-16

## 2022-06-10 RX ORDER — OXYCODONE HYDROCHLORIDE 5 MG/1
5 TABLET ORAL EVERY 8 HOURS
Refills: 0 | Status: DISCONTINUED | OUTPATIENT
Start: 2022-06-10 | End: 2022-06-11

## 2022-06-10 RX ORDER — MOMETASONE FUROATE 220 UG/1
1 INHALANT RESPIRATORY (INHALATION) DAILY
Refills: 0 | Status: DISCONTINUED | OUTPATIENT
Start: 2022-06-10 | End: 2022-06-16

## 2022-06-10 RX ORDER — MORPHINE SULFATE 50 MG/1
4 CAPSULE, EXTENDED RELEASE ORAL ONCE
Refills: 0 | Status: DISCONTINUED | OUTPATIENT
Start: 2022-06-10 | End: 2022-06-10

## 2022-06-10 RX ORDER — DIVALPROEX SODIUM 500 MG/1
500 TABLET, DELAYED RELEASE ORAL
Refills: 0 | Status: DISCONTINUED | OUTPATIENT
Start: 2022-06-10 | End: 2022-06-16

## 2022-06-10 RX ORDER — DIVALPROEX SODIUM 500 MG/1
1000 TABLET, DELAYED RELEASE ORAL AT BEDTIME
Refills: 0 | Status: DISCONTINUED | OUTPATIENT
Start: 2022-06-10 | End: 2022-06-10

## 2022-06-10 RX ORDER — DIPHENHYDRAMINE HCL 50 MG
25 CAPSULE ORAL ONCE
Refills: 0 | Status: COMPLETED | OUTPATIENT
Start: 2022-06-10 | End: 2022-06-10

## 2022-06-10 RX ORDER — MORPHINE SULFATE 50 MG/1
2 CAPSULE, EXTENDED RELEASE ORAL EVERY 6 HOURS
Refills: 0 | Status: DISCONTINUED | OUTPATIENT
Start: 2022-06-10 | End: 2022-06-12

## 2022-06-10 RX ORDER — ACETAMINOPHEN 500 MG
650 TABLET ORAL EVERY 6 HOURS
Refills: 0 | Status: DISCONTINUED | OUTPATIENT
Start: 2022-06-10 | End: 2022-06-16

## 2022-06-10 RX ORDER — DIPHENHYDRAMINE HCL 50 MG
25 CAPSULE ORAL EVERY 6 HOURS
Refills: 0 | Status: DISCONTINUED | OUTPATIENT
Start: 2022-06-10 | End: 2022-06-16

## 2022-06-10 RX ORDER — QUETIAPINE FUMARATE 200 MG/1
200 TABLET, FILM COATED ORAL
Refills: 0 | Status: DISCONTINUED | OUTPATIENT
Start: 2022-06-10 | End: 2022-06-16

## 2022-06-10 RX ORDER — DIPHENHYDRAMINE HCL 50 MG
25 CAPSULE ORAL ONCE
Refills: 0 | Status: DISCONTINUED | OUTPATIENT
Start: 2022-06-10 | End: 2022-06-10

## 2022-06-10 RX ORDER — VANCOMYCIN HCL 1 G
1000 VIAL (EA) INTRAVENOUS ONCE
Refills: 0 | Status: COMPLETED | OUTPATIENT
Start: 2022-06-10 | End: 2022-06-10

## 2022-06-10 RX ORDER — LANOLIN ALCOHOL/MO/W.PET/CERES
3 CREAM (GRAM) TOPICAL AT BEDTIME
Refills: 0 | Status: DISCONTINUED | OUTPATIENT
Start: 2022-06-10 | End: 2022-06-16

## 2022-06-10 RX ORDER — MIDAZOLAM HYDROCHLORIDE 1 MG/ML
2 INJECTION, SOLUTION INTRAMUSCULAR; INTRAVENOUS ONCE
Refills: 0 | Status: DISCONTINUED | OUTPATIENT
Start: 2022-06-10 | End: 2022-06-10

## 2022-06-10 RX ORDER — LURASIDONE HYDROCHLORIDE 40 MG/1
40 TABLET ORAL DAILY
Refills: 0 | Status: DISCONTINUED | OUTPATIENT
Start: 2022-06-10 | End: 2022-06-16

## 2022-06-10 RX ORDER — VANCOMYCIN HCL 1 G
1250 VIAL (EA) INTRAVENOUS EVERY 12 HOURS
Refills: 0 | Status: DISCONTINUED | OUTPATIENT
Start: 2022-06-10 | End: 2022-06-16

## 2022-06-10 RX ORDER — ARIPIPRAZOLE 15 MG/1
30 TABLET ORAL DAILY
Refills: 0 | Status: DISCONTINUED | OUTPATIENT
Start: 2022-06-10 | End: 2022-06-16

## 2022-06-10 RX ORDER — MORPHINE SULFATE 50 MG/1
2 CAPSULE, EXTENDED RELEASE ORAL ONCE
Refills: 0 | Status: DISCONTINUED | OUTPATIENT
Start: 2022-06-10 | End: 2022-06-10

## 2022-06-10 RX ORDER — QUETIAPINE FUMARATE 200 MG/1
50 TABLET, FILM COATED ORAL
Refills: 0 | Status: DISCONTINUED | OUTPATIENT
Start: 2022-06-10 | End: 2022-06-16

## 2022-06-10 RX ADMIN — OXYCODONE HYDROCHLORIDE 5 MILLIGRAM(S): 5 TABLET ORAL at 14:15

## 2022-06-10 RX ADMIN — QUETIAPINE FUMARATE 200 MILLIGRAM(S): 200 TABLET, FILM COATED ORAL at 21:20

## 2022-06-10 RX ADMIN — Medication 10 MILLIGRAM(S): at 01:50

## 2022-06-10 RX ADMIN — OXYCODONE HYDROCHLORIDE 5 MILLIGRAM(S): 5 TABLET ORAL at 13:28

## 2022-06-10 RX ADMIN — MIDAZOLAM HYDROCHLORIDE 2 MILLIGRAM(S): 1 INJECTION, SOLUTION INTRAMUSCULAR; INTRAVENOUS at 03:40

## 2022-06-10 RX ADMIN — Medication 25 MILLIGRAM(S): at 15:11

## 2022-06-10 RX ADMIN — MORPHINE SULFATE 2 MILLIGRAM(S): 50 CAPSULE, EXTENDED RELEASE ORAL at 21:36

## 2022-06-10 RX ADMIN — Medication 1 MILLIGRAM(S): at 01:50

## 2022-06-10 RX ADMIN — MORPHINE SULFATE 4 MILLIGRAM(S): 50 CAPSULE, EXTENDED RELEASE ORAL at 02:37

## 2022-06-10 RX ADMIN — MORPHINE SULFATE 2 MILLIGRAM(S): 50 CAPSULE, EXTENDED RELEASE ORAL at 15:09

## 2022-06-10 RX ADMIN — Medication 650 MILLIGRAM(S): at 01:50

## 2022-06-10 RX ADMIN — Medication 25 MILLIGRAM(S): at 06:01

## 2022-06-10 RX ADMIN — MOMETASONE FUROATE 1 PUFF(S): 220 INHALANT RESPIRATORY (INHALATION) at 13:23

## 2022-06-10 RX ADMIN — DIVALPROEX SODIUM 1000 MILLIGRAM(S): 500 TABLET, DELAYED RELEASE ORAL at 21:20

## 2022-06-10 RX ADMIN — Medication 250 MILLIGRAM(S): at 05:00

## 2022-06-10 RX ADMIN — MORPHINE SULFATE 4 MILLIGRAM(S): 50 CAPSULE, EXTENDED RELEASE ORAL at 03:07

## 2022-06-10 RX ADMIN — LURASIDONE HYDROCHLORIDE 40 MILLIGRAM(S): 40 TABLET ORAL at 13:25

## 2022-06-10 RX ADMIN — Medication 166.67 MILLIGRAM(S): at 17:58

## 2022-06-10 RX ADMIN — Medication 0.5 MILLIGRAM(S): at 18:59

## 2022-06-10 RX ADMIN — DIVALPROEX SODIUM 500 MILLIGRAM(S): 500 TABLET, DELAYED RELEASE ORAL at 13:24

## 2022-06-10 RX ADMIN — Medication 25 MILLIGRAM(S): at 21:20

## 2022-06-10 RX ADMIN — MORPHINE SULFATE 2 MILLIGRAM(S): 50 CAPSULE, EXTENDED RELEASE ORAL at 06:01

## 2022-06-10 RX ADMIN — Medication 3 MILLIGRAM(S): at 22:23

## 2022-06-10 RX ADMIN — QUETIAPINE FUMARATE 50 MILLIGRAM(S): 200 TABLET, FILM COATED ORAL at 13:28

## 2022-06-10 RX ADMIN — Medication 50 MILLIGRAM(S): at 23:17

## 2022-06-10 RX ADMIN — MORPHINE SULFATE 2 MILLIGRAM(S): 50 CAPSULE, EXTENDED RELEASE ORAL at 15:30

## 2022-06-10 RX ADMIN — ARIPIPRAZOLE 30 MILLIGRAM(S): 15 TABLET ORAL at 13:24

## 2022-06-10 RX ADMIN — Medication 50 MILLIGRAM(S): at 01:50

## 2022-06-10 RX ADMIN — MORPHINE SULFATE 2 MILLIGRAM(S): 50 CAPSULE, EXTENDED RELEASE ORAL at 21:20

## 2022-06-10 RX ADMIN — APIXABAN 5 MILLIGRAM(S): 2.5 TABLET, FILM COATED ORAL at 17:58

## 2022-06-10 NOTE — ED ADULT NURSE NOTE - OBJECTIVE STATEMENT
A&Ox4, c/o right upper extremity swelling. Pt states she was given abx by another provider, and states swelling has worsened. Pt states migraine, and nausea, and dizziness. RUE warm to touch. Pt denies: chest pain, blurry vision, weakness, sob.

## 2022-06-10 NOTE — PATIENT PROFILE ADULT - FALL HARM RISK - HARM RISK INTERVENTIONS

## 2022-06-10 NOTE — ED PROVIDER NOTE - CARE PLAN
70 y. o. female long-term care resident of [address] [place] [address] [facility] [facility] Edelmira Chavez MD at [address] N/A 10/4/2018    SIGMOIDOSCOPY FLEXIBLE with Polypectomy and Bx performed by Hiram Georges MD at [facility] Dami Claros MD at [facility] CATH LAB    ND INSJ TUNNELED CVC W/O SUBQ PORT/ AGE 5 YR/> N/A 1/9/2020    INSERTION TUNNELED CENTRAL VENOUS CATHETER performed by Adriana Quintero MD at [facility] CATH LAB    Chip Moreno, Infectious Disease Specialist  [phone]  Olga 10, 2022  4:21 PM Principal Discharge DX:	Headache  Secondary Diagnosis:	Abdominal pain   1

## 2022-06-10 NOTE — ED PROVIDER NOTE - PHYSICAL EXAMINATION
General: Awake, alert and oriented. No acute distress. Well developed, hydrated and nourished. Appears stated age.   Skin: Skin in warm, dry and intact without rashes or lesions. Appropriate color for ethnicity  HENMT: head normocephalic and atraumatic; bilateral external ears without swelling. no nasal discharge. moist oral mucosa. supple neck, trachea midline  EYES: Conjunctiva clear. nonicteric sclera. EOM intact, Eyelids are normal in appearance without swelling or lesions.  Cardiac: well perfused, milidly tachcyardic  Respiratory: breathing comfortably on room air. no audible wheezing or stridor  Abdominal: nondistended, soft, mild epigastric ttp without guarding  MSK: Neck and back are without deformity, visible external skin changes, or signs of trauma. Curvature of the cervical, thoracic, and lumbar spine are within normal limits. right deltoid area mildly swollen with visible ecchymosis, ttp over that area. mildly warm. rom of right shoudler intact though with discomfort. also with mild ttp in area medial to shoulder without skin changes or deformity. no other external signs of trauma. no apparent deficits in ROM of any extremity  Neurological: The patient is awake, alert and oriented to person, place, and time with normal speech. CN 2-12 intact. no apparent deficits. Memory is normal and thought process is intact. moving all extremities equally, following commands  Psychiatric: Appropriate mood and affect. Good judgement and insight. No visual or auditory hallucinations.

## 2022-06-10 NOTE — ED ADULT NURSE NOTE - NSIMPLEMENTINTERV_GEN_ALL_ED
Implemented All Universal Safety Interventions:  [place] to call system. Call bell, personal items and telephone within reach. Instruct patient to call for assistance. Room bathroom lighting operational. Non-slip footwear when patient is off stretcher. Physically safe environment: no spills, clutter or unnecessary equipment. Stretcher in lowest position, wheels locked, appropriate side rails in place.

## 2022-06-10 NOTE — PATIENT PROFILE ADULT - NSPROPASSIVESMOKEEXPOSURE_GEN_A_NUR
Call received from Monalisa, RN coordinator with Dr. Hargrove McBride Orthopedic No

## 2022-06-10 NOTE — ED ADULT NURSE NOTE - ED STAT RN HANDOFF DETAILS
Report endorsed to ED hold ALEYDA Anne. Safety checks completed this shift. Safety rounds completed hourly.  IV sites checked Q2+remains WDL. Medications administered as ordered with no signs/symptoms of adverse reactions. Fall & skin precautions in place. Any issues endorsed to ED hold ALEYDA Anne for follow up.

## 2022-06-10 NOTE — H&P ADULT - ASSESSMENT
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 Reported intermittent shooting 10/10 pain associated with chills. ? Had IM medication on right shoulder 1-2 week ago. Patient also complain of more frequent migraine recently.   Hemodynamically stable, afebrile, sat well at RA. EKG with sinus tachy, , QTc 455. No leukocytosis. Plt 189, K 4.6, Cr 0.94, lactate 1.1. CT shoulder with fluid collection 6.5x 4.2x4.4cm at lateral deltoid region. CT head with no acute pathology. Mildly enlarged heterogenous pituitary gland with internal 6mm nodular hyperattetnuating, unchange since 3/3/2022. 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 S/P I&D in ED with 40ml of purulent drainage.     Admitted with right shoulder abscess

## 2022-06-10 NOTE — H&P ADULT - PROBLEM SELECTOR PLAN 1
present with right shoulder pain. H/o ? IM med 1-2 week ago  CT shoulder with fluid collection 6.5x 4.2x4.4cm at lateral deltoid region  Afebrile, no leukocytosis  S/P I&D in ED with 40ml of purulent drainage  Continue IV vancomycin  Follow up wound gram stain and culture  Pain control

## 2022-06-10 NOTE — ED PROVIDER NOTE - PROGRESS NOTE DETAILS
ct of fluid collection appreciated; needle aspiration performed with 40cc  of purulent fluid drained. will start on vancomycin (as patient was put on unknown antibiotic outpatient, consider mrsa) and admit for further treatment of large abscess possible requiring IR drainage

## 2022-06-10 NOTE — CHART NOTE - NSCHARTNOTEFT_GEN_A_CORE
Medicine Hospitalist PA    Requested by RN to place an IV heplock in patient. As per RN pt is a difficult stick. Placed IV heplock in left antecub. #20g using ultrasound guidance.  IV flushed and secured. RN aware.

## 2022-06-10 NOTE — H&P ADULT - HISTORY OF PRESENT ILLNESS
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 Reported intermittent shooting 10/10 pain associated with chills. ? Had IM medication on right shoulder 1-2 week ago. Patient also complain of more frequent migraine recently.   Hemodynamically stable, afebrile, sat well at RA. EKG with sinus tachy, , QTc 455. No leukocytosis. Plt 189, K 4.6, Cr 0.94, lactate 1.1. CT shoulder with fluid collection 6.5x 4.2x4.4cm at lateral deltoid region. CT head with no acute pathology. Mildly enlarged heterogenous pituitary gland with internal 6mm nodular hyperattetnuating, unchanged since 3/3/2022. 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 S/P I&D in ED with 40ml of purulent drainage.     SH: no toxic habits

## 2022-06-10 NOTE — ED PROVIDER NOTE - OBJECTIVE STATEMENT
35 yo F hx schizoaffective disorder, bipolar disorder, prior suicide attempts, seizures disorder vs pseudoseizures, factor v leiden with prior dvt/pe's on eliquis presenting with right shoulder pain for 5-6 days. also feels like shes having chills. denies trauma to the shoulder. also states she has been having a migraine hedaache for ~9 days that causes her to vomit sometimes and feels dizzy. patient states she does have mild abdominal pain, however this is chronic pain and is unchanged. ct abdomen/pelvis performed 1 week ago was without acute pathology. 35 yo F hx schizoaffective disorder, bipolar disorder, prior suicide attempts, seizures disorder vs pseudoseizures, factor v leiden with prior dvt/pe's on eliquis presenting with right shoulder pain for 5-6 days. also feels like shes having chills. denies trauma to the shoulder. also states she has been having a migraine hedaache for ~9 days that causes her to vomit sometimes and feels dizzy. patient states she does have mild abdominal pain, however this is chronic pain and is unchanged. ct abdomen/pelvis performed 1 week ago was without acute pathology. patient was put on unknown antibiotics but they havent helped

## 2022-06-10 NOTE — H&P ADULT - NSHPPHYSICALEXAM_GEN_ALL_CORE
CONSTITUTIONAL: Well developed, well nourished, alert and cooperative, no acute distress  EYES: PERRL, no scleral icterus  ENT: Mucosa moist, tongue normal.  NECK: Neck supple, trachea midline, non-tender  CARDIAC: Normal S1 and S2. Regular rate and rhythms. No murmurs. No Pedal edema. Peripheral pulses intact  LUNGS: Clear to auscultation, equal air entry both lungs. No rales, rhonchi, wheezing. Normal respiratory effort.   ABDOMEN: Soft, nondistended, nontender. No guarding or rebound tenderness. No hepatomegaly or splenomegaly. Bowel sound normal.   MUSCULOSKELETAL: Normocephalic, atraumatic. No significant deformity or joint abnormality. S/P right shoulder I&D with dressing in place  NEUROLOGICAL: No gross motor or sensory deficits.   SKIN: no lesions or eruptions. Normal turgor  PSYCHIATRIC: A&O x 3, appropriate mood and affect

## 2022-06-10 NOTE — ED PROVIDER NOTE - CLINICAL SUMMARY MEDICAL DECISION MAKING FREE TEXT BOX
patinet neurologically intact. following commands and moving all extremities. though with prior dvt/pe, the area of concern is not consistent with upper extremity dvt as the swelling is confined to the deltoid area. bedside us with possible complex fluid collection of deltoid, no flow sen in the area, no cobblestoning of skin. per chart review patient received IM medications duing an ED visit 6 days ago - consider hematoma and/or abscess as the area of swelling is in a location commonly used for IM injections. patinet with migraine and nausea/vomiting, neurologically intact; will obtain CT head to screen for intracranial pathology, no clinicla signs of cva or central venous thrombus. mild abdominal ttp, patient states this is chronic, ct a/p 6 days ago without adnexa visualized however no acute pathology identified. tenderness on exam is not in adnexal area. tachycrdia more likely 2/2 pain, no chest pain or sob indicative of pe. consider infection as cause

## 2022-06-11 LAB
ALBUMIN SERPL ELPH-MCNC: 3.3 G/DL — SIGNIFICANT CHANGE UP (ref 3.3–5)
ALP SERPL-CCNC: 48 U/L — SIGNIFICANT CHANGE UP (ref 40–120)
ALT FLD-CCNC: 45 U/L — SIGNIFICANT CHANGE UP (ref 12–78)
ANION GAP SERPL CALC-SCNC: 8 MMOL/L — SIGNIFICANT CHANGE UP (ref 5–17)
AST SERPL-CCNC: 35 U/L — SIGNIFICANT CHANGE UP (ref 15–37)
BILIRUB SERPL-MCNC: 0.3 MG/DL — SIGNIFICANT CHANGE UP (ref 0.2–1.2)
BUN SERPL-MCNC: 8 MG/DL — SIGNIFICANT CHANGE UP (ref 7–23)
CALCIUM SERPL-MCNC: 9 MG/DL — SIGNIFICANT CHANGE UP (ref 8.5–10.1)
CHLORIDE SERPL-SCNC: 106 MMOL/L — SIGNIFICANT CHANGE UP (ref 96–108)
CO2 SERPL-SCNC: 26 MMOL/L — SIGNIFICANT CHANGE UP (ref 22–31)
CREAT SERPL-MCNC: 0.78 MG/DL — SIGNIFICANT CHANGE UP (ref 0.5–1.3)
EGFR: 102 ML/MIN/1.73M2 — SIGNIFICANT CHANGE UP
GLUCOSE SERPL-MCNC: 84 MG/DL — SIGNIFICANT CHANGE UP (ref 70–99)
HCT VFR BLD CALC: 35.6 % — SIGNIFICANT CHANGE UP (ref 34.5–45)
HGB BLD-MCNC: 11.4 G/DL — LOW (ref 11.5–15.5)
MAGNESIUM SERPL-MCNC: 2.1 MG/DL — SIGNIFICANT CHANGE UP (ref 1.6–2.6)
MCHC RBC-ENTMCNC: 29.9 PG — SIGNIFICANT CHANGE UP (ref 27–34)
MCHC RBC-ENTMCNC: 32 G/DL — SIGNIFICANT CHANGE UP (ref 32–36)
MCV RBC AUTO: 93.4 FL — SIGNIFICANT CHANGE UP (ref 80–100)
NRBC # BLD: 0 /100 WBCS — SIGNIFICANT CHANGE UP (ref 0–0)
PHOSPHATE SERPL-MCNC: 3.4 MG/DL — SIGNIFICANT CHANGE UP (ref 2.5–4.5)
PLATELET # BLD AUTO: 189 K/UL — SIGNIFICANT CHANGE UP (ref 150–400)
POTASSIUM SERPL-MCNC: 3.9 MMOL/L — SIGNIFICANT CHANGE UP (ref 3.5–5.3)
POTASSIUM SERPL-SCNC: 3.9 MMOL/L — SIGNIFICANT CHANGE UP (ref 3.5–5.3)
PROT SERPL-MCNC: 7.3 GM/DL — SIGNIFICANT CHANGE UP (ref 6–8.3)
RBC # BLD: 3.81 M/UL — SIGNIFICANT CHANGE UP (ref 3.8–5.2)
RBC # FLD: 15.5 % — HIGH (ref 10.3–14.5)
SODIUM SERPL-SCNC: 140 MMOL/L — SIGNIFICANT CHANGE UP (ref 135–145)
VANCOMYCIN TROUGH SERPL-MCNC: 8.7 UG/ML — LOW (ref 10–20)
WBC # BLD: 3.76 K/UL — LOW (ref 3.8–10.5)
WBC # FLD AUTO: 3.76 K/UL — LOW (ref 3.8–10.5)

## 2022-06-11 PROCEDURE — 99232 SBSQ HOSP IP/OBS MODERATE 35: CPT

## 2022-06-11 RX ORDER — OXYCODONE AND ACETAMINOPHEN 5; 325 MG/1; MG/1
2 TABLET ORAL EVERY 6 HOURS
Refills: 0 | Status: DISCONTINUED | OUTPATIENT
Start: 2022-06-11 | End: 2022-06-16

## 2022-06-11 RX ORDER — DIPHENHYDRAMINE HCL 50 MG
25 CAPSULE ORAL ONCE
Refills: 0 | Status: COMPLETED | OUTPATIENT
Start: 2022-06-11 | End: 2022-06-11

## 2022-06-11 RX ADMIN — Medication 0.5 MILLIGRAM(S): at 06:38

## 2022-06-11 RX ADMIN — MORPHINE SULFATE 2 MILLIGRAM(S): 50 CAPSULE, EXTENDED RELEASE ORAL at 05:23

## 2022-06-11 RX ADMIN — Medication 3 MILLIGRAM(S): at 22:08

## 2022-06-11 RX ADMIN — MORPHINE SULFATE 2 MILLIGRAM(S): 50 CAPSULE, EXTENDED RELEASE ORAL at 22:07

## 2022-06-11 RX ADMIN — QUETIAPINE FUMARATE 50 MILLIGRAM(S): 200 TABLET, FILM COATED ORAL at 12:22

## 2022-06-11 RX ADMIN — QUETIAPINE FUMARATE 200 MILLIGRAM(S): 200 TABLET, FILM COATED ORAL at 10:42

## 2022-06-11 RX ADMIN — OXYCODONE HYDROCHLORIDE 5 MILLIGRAM(S): 5 TABLET ORAL at 09:01

## 2022-06-11 RX ADMIN — Medication 25 MILLIGRAM(S): at 14:44

## 2022-06-11 RX ADMIN — APIXABAN 5 MILLIGRAM(S): 2.5 TABLET, FILM COATED ORAL at 06:38

## 2022-06-11 RX ADMIN — MOMETASONE FUROATE 1 PUFF(S): 220 INHALANT RESPIRATORY (INHALATION) at 12:27

## 2022-06-11 RX ADMIN — OXYCODONE HYDROCHLORIDE 5 MILLIGRAM(S): 5 TABLET ORAL at 08:39

## 2022-06-11 RX ADMIN — OXYCODONE AND ACETAMINOPHEN 2 TABLET(S): 5; 325 TABLET ORAL at 18:51

## 2022-06-11 RX ADMIN — APIXABAN 5 MILLIGRAM(S): 2.5 TABLET, FILM COATED ORAL at 17:14

## 2022-06-11 RX ADMIN — LURASIDONE HYDROCHLORIDE 40 MILLIGRAM(S): 40 TABLET ORAL at 14:43

## 2022-06-11 RX ADMIN — DIVALPROEX SODIUM 1000 MILLIGRAM(S): 500 TABLET, DELAYED RELEASE ORAL at 22:08

## 2022-06-11 RX ADMIN — MORPHINE SULFATE 2 MILLIGRAM(S): 50 CAPSULE, EXTENDED RELEASE ORAL at 04:41

## 2022-06-11 RX ADMIN — Medication 25 MILLIGRAM(S): at 16:17

## 2022-06-11 RX ADMIN — MORPHINE SULFATE 2 MILLIGRAM(S): 50 CAPSULE, EXTENDED RELEASE ORAL at 16:18

## 2022-06-11 RX ADMIN — PANTOPRAZOLE SODIUM 40 MILLIGRAM(S): 20 TABLET, DELAYED RELEASE ORAL at 08:19

## 2022-06-11 RX ADMIN — ARIPIPRAZOLE 30 MILLIGRAM(S): 15 TABLET ORAL at 14:44

## 2022-06-11 RX ADMIN — MORPHINE SULFATE 2 MILLIGRAM(S): 50 CAPSULE, EXTENDED RELEASE ORAL at 10:49

## 2022-06-11 RX ADMIN — OXYCODONE AND ACETAMINOPHEN 2 TABLET(S): 5; 325 TABLET ORAL at 18:20

## 2022-06-11 RX ADMIN — MORPHINE SULFATE 2 MILLIGRAM(S): 50 CAPSULE, EXTENDED RELEASE ORAL at 16:41

## 2022-06-11 RX ADMIN — Medication 166.67 MILLIGRAM(S): at 06:38

## 2022-06-11 RX ADMIN — Medication 75 MICROGRAM(S): at 06:38

## 2022-06-11 RX ADMIN — MORPHINE SULFATE 2 MILLIGRAM(S): 50 CAPSULE, EXTENDED RELEASE ORAL at 23:57

## 2022-06-11 RX ADMIN — Medication 0.5 MILLIGRAM(S): at 17:12

## 2022-06-11 RX ADMIN — OXYCODONE AND ACETAMINOPHEN 2 TABLET(S): 5; 325 TABLET ORAL at 13:00

## 2022-06-11 RX ADMIN — DIVALPROEX SODIUM 500 MILLIGRAM(S): 500 TABLET, DELAYED RELEASE ORAL at 12:22

## 2022-06-11 RX ADMIN — MORPHINE SULFATE 2 MILLIGRAM(S): 50 CAPSULE, EXTENDED RELEASE ORAL at 10:35

## 2022-06-11 RX ADMIN — Medication 25 MILLIGRAM(S): at 10:38

## 2022-06-11 RX ADMIN — QUETIAPINE FUMARATE 200 MILLIGRAM(S): 200 TABLET, FILM COATED ORAL at 22:08

## 2022-06-11 RX ADMIN — Medication 166.67 MILLIGRAM(S): at 18:41

## 2022-06-11 RX ADMIN — OXYCODONE AND ACETAMINOPHEN 2 TABLET(S): 5; 325 TABLET ORAL at 12:23

## 2022-06-11 RX ADMIN — Medication 25 MILLIGRAM(S): at 04:41

## 2022-06-11 NOTE — PROGRESS NOTE ADULT - ASSESSMENT
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 Reported intermittent shooting 10/10 pain associated with chills. ? Had IM medication on right shoulder 1-2 week ago. Patient also complain of more frequent migraine recently.   Hemodynamically stable, afebrile, sat well at [facility]. EKG with sinus tachy, , QTc 455. No leukocytosis. Plt 189, K 4.6, Cr 0.94, lactate 1.1. CT shoulder with fluid collection 6.5x 4.2x4.4cm at lateral deltoid region. CT head with no acute pathology. Mildly enlarged heterogenous pituitary gland with internal 6mm nodular hyperattetnuating, unchange since 3/3/2022. 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 S/P I&D in ED with 40ml of purulent drainage.     Admitted with right shoulder abscess    Problem/Plan - 1:  ·  Problem: Abscess of right shoulder.   ·  Plan: present with right shoulder pain. H/o ? IM med 1-2 week ago  CT shoulder with fluid collection 6.5x 4.2x4.4cm at lateral deltoid region  Afebrile, no leukocytosis  S/P I&D in ED with 40ml of purulent drainage  Continue IV vancomycin  Follow up wound gram stain and culture  Pain control.  - consider Ortho consult if worsening leucocytosis or pain  - switch pain meds to oral percocet      Problem/Plan - 2:  ·  Problem: Bipolar disorder.   ·  Plan: Continue aripiprazole, Depakote,  quetiapine.    Problem/Plan - 3:  ·  Problem: Seizure disorder.   ·  Plan: On Depakote 500mg AM, 1000mg PM.  - chart reviewed.  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  Would be cautious of "breakthrough" seizures    Problem/Plan - 4:  ·  Problem: Hypothyroidism, unspecified.   ·  Plan: On synthroid 75mcg.    Problem/Plan - 5:  ·  Problem: Factor V Leiden.   ·  Plan: h/o DVT  Continue apixaban 5mg bid.    Problem/Plan - 6:  ·  Problem: History of DVT (deep vein thrombosis).   ·  Plan: continue apixaban 5mg bid.

## 2022-06-11 NOTE — PROGRESS NOTE ADULT - SUBJECTIVE AND OBJECTIVE BOX
Patient is a 36y old  Female who presents with a chief complaint of right shoulder abscess (10 Júnior 2022 11:54)    HPI:  36 years old female with h/o bipolar disorder, hypothyroidism, factor V liden with h/o VTE on apixaban, seizure disorder present to ED with complain of right shoulder pain for 1 week, which progressively worsen. Reported intermittent shooting 10/10 pain associated with chills. ? Had IM medication on right shoulder 1-2 week ago. Patient also complain of more frequent migraine recently.   Hemodynamically stable, afebrile, sat well at RA. EKG with sinus tachy, , QTc 455. No leukocytosis. Plt 189, K 4.6, Cr 0.94, lactate 1.1. CT shoulder with fluid collection 6.5x 4.2x4.4cm at lateral deltoid region. CT head with no acute pathology. Mildly enlarged heterogenous pituitary gland with internal 6mm nodular hyperattetnuating, unchanged since 3/3/2022. 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 S/P I&D in ED with 40ml of purulent drainage.     SH: no toxic habits (10 Júnior 2022 11:54)    SUBJECTIVE & OBJECTIVE: Pt seen and examined at bedside.   PHYSICAL EXAM:  T(C): 36.4 (06-11-22 @ 16:52), Max: 36.7 (06-11-22 @ 00:45)  HR: 101 (06-11-22 @ 16:52) (98 - 105)  BP: 101/70 (06-11-22 @ 16:52) (101/70 - 119/79)  RR: 18 (06-11-22 @ 16:52) (18 - 18)  SpO2: 95% (06-11-22 @ 16:52) (95% - 97%) Daily     Daily I&O's Detail    10 Júnior 2022 07:01  -  11 Jun 2022 07:00  --------------------------------------------------------  IN:    Oral Fluid: 480 mL  Total IN: 480 mL    OUT:  Total OUT: 0 mL    Total NET: 480 mL        GENERAL: NAD, well-groomed, well-developed  HEAD:  Atraumatic, Normocephalic  EYES: EOMI, PERRLA, conjunctiva and sclera clear  ENMT: Moist mucous membranes  NECK: Supple, No JVD  NERVOUS SYSTEM:  Alert & Oriented X3, Motor Strength 5/5 B/L upper and lower extremities; DTRs 2+ intact and symmetric  CHEST/LUNG: Clear to auscultation bilaterally; No rales, rhonchi, wheezing, or rubs  HEART: Regular rate and rhythm; No murmurs, rubs, or gallops  ABDOMEN: Soft, Nontender, Nondistended; Bowel sounds present  EXTREMITIES:  2+ Peripheral Pulses, No clubbing, cyanosis, or edema  LABS:                        11.4   3.76  )-----------( 189      ( 11 Jun 2022 10:32 )             35.6   PT/INR - ( 10 Júnior 2022 00:52 )   PT: 13.5 sec;   INR: 1.12 ratio         PTT - ( 10 Júnior 2022 00:52 )  PTT:30.2 secCAPILLARY BLOOD GLUCOSE        RECENT CULTURES:   RADIOLOGY & ADDITIONAL TESTS:

## 2022-06-12 LAB
ANION GAP SERPL CALC-SCNC: 6 MMOL/L — SIGNIFICANT CHANGE UP (ref 5–17)
BUN SERPL-MCNC: 11 MG/DL — SIGNIFICANT CHANGE UP (ref 7–23)
CALCIUM SERPL-MCNC: 9.1 MG/DL — SIGNIFICANT CHANGE UP (ref 8.5–10.1)
CHLORIDE SERPL-SCNC: 106 MMOL/L — SIGNIFICANT CHANGE UP (ref 96–108)
CO2 SERPL-SCNC: 27 MMOL/L — SIGNIFICANT CHANGE UP (ref 22–31)
CREAT SERPL-MCNC: 0.8 MG/DL — SIGNIFICANT CHANGE UP (ref 0.5–1.3)
EGFR: 99 ML/MIN/1.73M2 — SIGNIFICANT CHANGE UP
GLUCOSE BLDC GLUCOMTR-MCNC: 129 MG/DL — HIGH (ref 70–99)
GLUCOSE SERPL-MCNC: 80 MG/DL — SIGNIFICANT CHANGE UP (ref 70–99)
HCT VFR BLD CALC: 34.5 % — SIGNIFICANT CHANGE UP (ref 34.5–45)
HGB BLD-MCNC: 11 G/DL — LOW (ref 11.5–15.5)
MCHC RBC-ENTMCNC: 30.1 PG — SIGNIFICANT CHANGE UP (ref 27–34)
MCHC RBC-ENTMCNC: 31.9 G/DL — LOW (ref 32–36)
MCV RBC AUTO: 94.5 FL — SIGNIFICANT CHANGE UP (ref 80–100)
NRBC # BLD: 0 /100 WBCS — SIGNIFICANT CHANGE UP (ref 0–0)
PLATELET # BLD AUTO: 198 K/UL — SIGNIFICANT CHANGE UP (ref 150–400)
POTASSIUM SERPL-MCNC: 4.7 MMOL/L — SIGNIFICANT CHANGE UP (ref 3.5–5.3)
POTASSIUM SERPL-SCNC: 4.7 MMOL/L — SIGNIFICANT CHANGE UP (ref 3.5–5.3)
RBC # BLD: 3.65 M/UL — LOW (ref 3.8–5.2)
RBC # FLD: 15.5 % — HIGH (ref 10.3–14.5)
SODIUM SERPL-SCNC: 139 MMOL/L — SIGNIFICANT CHANGE UP (ref 135–145)
VANCOMYCIN TROUGH SERPL-MCNC: 12.4 UG/ML — SIGNIFICANT CHANGE UP (ref 10–20)
WBC # BLD: 3.7 K/UL — LOW (ref 3.8–10.5)
WBC # FLD AUTO: 3.7 K/UL — LOW (ref 3.8–10.5)

## 2022-06-12 PROCEDURE — 99221 1ST HOSP IP/OBS SF/LOW 40: CPT

## 2022-06-12 PROCEDURE — 71045 X-RAY EXAM CHEST 1 VIEW: CPT | Mod: 26

## 2022-06-12 PROCEDURE — 99232 SBSQ HOSP IP/OBS MODERATE 35: CPT

## 2022-06-12 RX ADMIN — QUETIAPINE FUMARATE 200 MILLIGRAM(S): 200 TABLET, FILM COATED ORAL at 20:25

## 2022-06-12 RX ADMIN — MORPHINE SULFATE 2 MILLIGRAM(S): 50 CAPSULE, EXTENDED RELEASE ORAL at 14:49

## 2022-06-12 RX ADMIN — APIXABAN 5 MILLIGRAM(S): 2.5 TABLET, FILM COATED ORAL at 05:36

## 2022-06-12 RX ADMIN — OXYCODONE AND ACETAMINOPHEN 2 TABLET(S): 5; 325 TABLET ORAL at 05:36

## 2022-06-12 RX ADMIN — OXYCODONE AND ACETAMINOPHEN 2 TABLET(S): 5; 325 TABLET ORAL at 00:00

## 2022-06-12 RX ADMIN — DIVALPROEX SODIUM 500 MILLIGRAM(S): 500 TABLET, DELAYED RELEASE ORAL at 10:18

## 2022-06-12 RX ADMIN — MORPHINE SULFATE 2 MILLIGRAM(S): 50 CAPSULE, EXTENDED RELEASE ORAL at 07:45

## 2022-06-12 RX ADMIN — ALBUTEROL 2 PUFF(S): 90 AEROSOL, METERED ORAL at 06:39

## 2022-06-12 RX ADMIN — Medication 25 MILLIGRAM(S): at 05:38

## 2022-06-12 RX ADMIN — PANTOPRAZOLE SODIUM 40 MILLIGRAM(S): 20 TABLET, DELAYED RELEASE ORAL at 07:57

## 2022-06-12 RX ADMIN — APIXABAN 5 MILLIGRAM(S): 2.5 TABLET, FILM COATED ORAL at 17:49

## 2022-06-12 RX ADMIN — OXYCODONE AND ACETAMINOPHEN 2 TABLET(S): 5; 325 TABLET ORAL at 12:19

## 2022-06-12 RX ADMIN — MORPHINE SULFATE 2 MILLIGRAM(S): 50 CAPSULE, EXTENDED RELEASE ORAL at 15:57

## 2022-06-12 RX ADMIN — DIVALPROEX SODIUM 1000 MILLIGRAM(S): 500 TABLET, DELAYED RELEASE ORAL at 22:04

## 2022-06-12 RX ADMIN — Medication 0.5 MILLIGRAM(S): at 17:49

## 2022-06-12 RX ADMIN — OXYCODONE AND ACETAMINOPHEN 2 TABLET(S): 5; 325 TABLET ORAL at 17:49

## 2022-06-12 RX ADMIN — MOMETASONE FUROATE 1 PUFF(S): 220 INHALANT RESPIRATORY (INHALATION) at 12:03

## 2022-06-12 RX ADMIN — ARIPIPRAZOLE 30 MILLIGRAM(S): 15 TABLET ORAL at 11:51

## 2022-06-12 RX ADMIN — MORPHINE SULFATE 2 MILLIGRAM(S): 50 CAPSULE, EXTENDED RELEASE ORAL at 08:06

## 2022-06-12 RX ADMIN — QUETIAPINE FUMARATE 50 MILLIGRAM(S): 200 TABLET, FILM COATED ORAL at 11:55

## 2022-06-12 RX ADMIN — MORPHINE SULFATE 2 MILLIGRAM(S): 50 CAPSULE, EXTENDED RELEASE ORAL at 20:28

## 2022-06-12 RX ADMIN — OXYCODONE AND ACETAMINOPHEN 2 TABLET(S): 5; 325 TABLET ORAL at 11:51

## 2022-06-12 RX ADMIN — Medication 75 MICROGRAM(S): at 05:37

## 2022-06-12 RX ADMIN — OXYCODONE AND ACETAMINOPHEN 2 TABLET(S): 5; 325 TABLET ORAL at 18:33

## 2022-06-12 RX ADMIN — Medication 166.67 MILLIGRAM(S): at 05:33

## 2022-06-12 RX ADMIN — Medication 25 MILLIGRAM(S): at 17:49

## 2022-06-12 RX ADMIN — QUETIAPINE FUMARATE 200 MILLIGRAM(S): 200 TABLET, FILM COATED ORAL at 09:53

## 2022-06-12 RX ADMIN — Medication 0.5 MILLIGRAM(S): at 19:50

## 2022-06-12 RX ADMIN — LURASIDONE HYDROCHLORIDE 40 MILLIGRAM(S): 40 TABLET ORAL at 11:51

## 2022-06-12 RX ADMIN — ALBUTEROL 2 PUFF(S): 90 AEROSOL, METERED ORAL at 14:53

## 2022-06-12 RX ADMIN — Medication 166.67 MILLIGRAM(S): at 17:46

## 2022-06-12 NOTE — PROGRESS NOTE ADULT - ASSESSMENT
36 years old female with history of bipolar disorder, hypothyroidism, factor V Leiden with history of VTE on apixaban, seizure disorder presented w/ right shoulder pain for 1 week after being given IM medication on right shoulder 1-2 week ago & was admitted with right shoulder fluid collection.    Collection in right shoulder  - CT shoulder with fluid collection 6.5x 4.2x4.4cm at lateral deltoid region    - status post I&D in ED with 40ml of purulent drainage  - c/w IV vancomycin  - NGTD on aspirate culture - suspect hematoma  - as per surgery, will get IR left deltoid fluid aspiration pending repeat ultrasound   -c/w oral percocet    Seizure disorder  - c/w Depakote    - patient had a seizure episode today - but looked more like pseudoseizure (she has a history of pseudoseizures and aggressive behavior) - will consult neurology and psych   - check EEG     Bipolar disorder  - c/w Aripiprazole, Latuda, Depakote, quetiapine & ativan    Hypothyroidism  - c/w Synthroid      Factor V Leiden w/ DVT  - Eliquis on hold for aspiration    Asthma  - c/w Asthmanex and albuterol PRN    Prophylaxis:  DVT: Eliquis on hold for aspiration  GI: Protonix

## 2022-06-12 NOTE — PROGRESS NOTE ADULT - SUBJECTIVE AND OBJECTIVE BOX
36 years old female with history of bipolar disorder, hypothyroidism, factor V Leiden with history of VTE on apixaban, seizure disorder presented w/ right shoulder pain for 1 week after being given IM medication on right shoulder 1-2 week ago & was admitted with right shoulder fluid collection. She is lying in bed in NAD.    MEDICATIONS  (STANDING):  apixaban 5 milliGRAM(s) Oral every 12 hours  ARIPiprazole 30 milliGRAM(s) Oral daily  diVALproex  milliGRAM(s) Oral <User Schedule>  diVALproex ER 1000 milliGRAM(s) Oral <User Schedule>  influenza   Vaccine 0.5 milliLiter(s) IntraMuscular once  levothyroxine 75 MICROGram(s) Oral daily  LORazepam     Tablet 0.5 milliGRAM(s) Oral two times a day  lurasidone 40 milliGRAM(s) Oral daily  mometasone 220 MICROgram(s) Inhaler 1 Puff(s) Inhalation daily  pantoprazole    Tablet 40 milliGRAM(s) Oral before breakfast  QUEtiapine 50 milliGRAM(s) Oral <User Schedule>  QUEtiapine 200 milliGRAM(s) Oral <User Schedule>  vancomycin  IVPB 1250 milliGRAM(s) IV Intermittent every 12 hours    MEDICATIONS  (PRN):  acetaminophen     Tablet .. 650 milliGRAM(s) Oral every 6 hours PRN Temp greater or equal to 38C (100.4F), Mild Pain (1 - 3)  ALBUTerol    90 MICROgram(s) HFA Inhaler 2 Puff(s) Inhalation every 6 hours PRN Shortness of Breath and/or Wheezing  diphenhydrAMINE 25 milliGRAM(s) Oral every 6 hours PRN Rash and/or Itching  melatonin 3 milliGRAM(s) Oral at bedtime PRN Insomnia  morphine  - Injectable 2 milliGRAM(s) IV Push every 6 hours PRN Severe Pain (7 - 10)  oxycodone    5 mG/acetaminophen 325 mG 2 Tablet(s) Oral every 6 hours PRN Moderate Pain (4 - 6)      Allergies    latex (Blisters)  penicillin (Hives)  penicillin (Other)  pineapple (Unknown)  Toradol (Rash)  Toradol (Unknown)  Zofran (Hives)  Zofran (Unknown)    Intolerances        Vital Signs Last 24 Hrs  T(C): 36.7 (12 Jun 2022 19:52), Max: 36.9 (12 Jun 2022 05:12)  T(F): 98.1 (12 Jun 2022 19:52), Max: 98.4 (12 Jun 2022 05:12)  HR: 121 (12 Jun 2022 19:52) (82 - 121)  BP: 109/59 (12 Jun 2022 19:52) (99/70 - 111/77)   RR: 18 (12 Jun 2022 19:52) (18 - 19)  SpO2: 95% (12 Jun 2022 19:52) (95% - 100%)    PHYSICAL EXAM:  GENERAL: NAD, well-groomed, well-developed  HEAD:  Atraumatic, Normocephalic  EYES: EOMI, PERRLA, conjunctiva and sclera clear  ENMT: No tonsillar erythema, exudates, or enlargement; Moist mucous membranes, Good dentition, No lesions  NECK: Supple, No JVD, Normal thyroid  NERVOUS SYSTEM:  Alert & Oriented X3, Good concentration; Motor Strength 5/5 B/L upper and lower extremities; DTRs 2+ intact and symmetric  CHEST/LUNG: Clear to auscultation bilaterally; No rales, rhonchi, wheezing, or rubs  HEART: Regular rate and rhythm; No murmurs, rubs, or gallops  ABDOMEN: Soft, Nontender, Nondistended; Bowel sounds present  EXTREMITIES: right shoulder is tender w/ fluctuance       LABS:                        11.0   3.70  )-----------( 198      ( 12 Jun 2022 08:01 )             34.5     06-12    139  |  106  |  11  ----------------------------<  80  4.7   |  27  |  0.80    Ca    9.1      12 Jun 2022 08:01  Phos  3.4     06-11  Mg     2.1     06-11    TPro  7.3  /  Alb  3.3  /  TBili  0.3  /  DBili  x   /  AST  35  /  ALT  45  /  AlkPhos  48  06-11        CAPILLARY BLOOD GLUCOSE      POCT Blood Glucose.: 129 mg/dL (12 Jun 2022 19:40)      Culture - Abscess with Gram Stain (collected 10 Júnior 2022 10:39)  Source: .Abscess Arm - Right  Preliminary Report (11 Jun 2022 14:23):    No growth    Culture - Blood (collected 10 Júnior 2022 10:35)  Source: .Blood Blood-Peripheral  Preliminary Report (11 Jun 2022 11:01):    No growth to date.    Culture - Blood (collected 10 Júnior 2022 10:35)  Source: .Blood Blood-Peripheral  Preliminary Report (11 Jun 2022 11:01):    No growth to date.      RADIOLOGY & ADDITIONAL TESTS:    06-11-22 @ 07:01  -  06-12-22 @ 07:00  --------------------------------------------------------  IN:  Total IN: 0 mL    OUT:    Voided (mL): 750 mL  Total OUT: 750 mL    Total NET: -750 mL      06-12-22 @ 07:01  -  06-12-22 @ 20:53  --------------------------------------------------------  IN:    IV PiggyBack: 250 mL  Total IN: 250 mL    OUT:  Total OUT: 0 mL    Total NET: 250 mL

## 2022-06-12 NOTE — CONSULT NOTE ADULT - ASSESSMENT
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 Reported intermittent shooting 10/10 pain associated with chills. ? Had IM medication on right shoulder 1-2 week ago.     Concern for right deltoid abscess, with CT showing 6.5 x 4.2 x 4.4 cm collection. S/p I&D in the ED, with 40ml purulent drainage obtained. She has no fevers but has some leukopenia, WBC stable at 3.7. Wound and Blood cultures currently no growth.    -continue vancomycin  -follow cultures to completion  -suggest CXR    Thank you for courtesy of this consult.     Will follow.  Discussed with the primary team.     Mahsa Marquez MD  Division of Infectious Diseases   Cell [phone] between 8am and 6pm   After 6pm and weekends please call ID service at [phone].  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 Reported intermittent shooting 10/10 pain associated with chills. ? Had IM medication on right shoulder 1-2 week ago.     Found to have right deltoid fluid collection, with CT showing 6.5 x 4.2 x 4.4 cm collection. Unclear if infectious vs hematoma, plan for IR aspiration. S/p I&D in the ED, with 40ml purulent drainage obtained per procedure note. However fluid cultures currently no growth. Blood cultures also currently no growth. She has no fevers but has some leukopenia, WBC stable at 3.7.     -continue vancomycin pending finalization of wound cultures  -follow cultures to completion  -follow up RUE US  -suggest CXR     Thank you for courtesy of this consult.     Will follow.  Discussed with the primary team.     Mahsa Marquez MD  Division of Infectious Diseases   Cell [phone] between 8am and 6pm   After 6pm and weekends please call ID service at [phone].

## 2022-06-13 LAB
ANION GAP SERPL CALC-SCNC: 7 MMOL/L — SIGNIFICANT CHANGE UP (ref 5–17)
APPEARANCE UR: CLEAR — SIGNIFICANT CHANGE UP
APTT BLD: 30.5 SEC — SIGNIFICANT CHANGE UP (ref 27.5–35.5)
BACTERIA # UR AUTO: ABNORMAL
BILIRUB UR-MCNC: NEGATIVE — SIGNIFICANT CHANGE UP
BLD GP AB SCN SERPL QL: SIGNIFICANT CHANGE UP
BUN SERPL-MCNC: 12 MG/DL — SIGNIFICANT CHANGE UP (ref 7–23)
CALCIUM SERPL-MCNC: 8.9 MG/DL — SIGNIFICANT CHANGE UP (ref 8.5–10.1)
CHLORIDE SERPL-SCNC: 108 MMOL/L — SIGNIFICANT CHANGE UP (ref 96–108)
CO2 SERPL-SCNC: 25 MMOL/L — SIGNIFICANT CHANGE UP (ref 22–31)
COLOR SPEC: YELLOW — SIGNIFICANT CHANGE UP
CREAT SERPL-MCNC: 0.7 MG/DL — SIGNIFICANT CHANGE UP (ref 0.5–1.3)
DIFF PNL FLD: ABNORMAL
EGFR: 116 ML/MIN/1.73M2 — SIGNIFICANT CHANGE UP
EPI CELLS # UR: SIGNIFICANT CHANGE UP
GLUCOSE SERPL-MCNC: 89 MG/DL — SIGNIFICANT CHANGE UP (ref 70–99)
GLUCOSE UR QL: NEGATIVE MG/DL — SIGNIFICANT CHANGE UP
GRAM STN FLD: SIGNIFICANT CHANGE UP
HCT VFR BLD CALC: 32.6 % — LOW (ref 34.5–45)
HGB BLD-MCNC: 10.3 G/DL — LOW (ref 11.5–15.5)
INR BLD: 1.11 RATIO — SIGNIFICANT CHANGE UP (ref 0.88–1.16)
KETONES UR-MCNC: ABNORMAL
LEUKOCYTE ESTERASE UR-ACNC: NEGATIVE — SIGNIFICANT CHANGE UP
MAGNESIUM SERPL-MCNC: 2.2 MG/DL — SIGNIFICANT CHANGE UP (ref 1.6–2.6)
MCHC RBC-ENTMCNC: 29.7 PG — SIGNIFICANT CHANGE UP (ref 27–34)
MCHC RBC-ENTMCNC: 31.6 G/DL — LOW (ref 32–36)
MCV RBC AUTO: 93.9 FL — SIGNIFICANT CHANGE UP (ref 80–100)
NITRITE UR-MCNC: NEGATIVE — SIGNIFICANT CHANGE UP
NRBC # BLD: 0 /100 WBCS — SIGNIFICANT CHANGE UP (ref 0–0)
PH UR: 6.5 — SIGNIFICANT CHANGE UP (ref 5–8)
PHOSPHATE SERPL-MCNC: 4 MG/DL — SIGNIFICANT CHANGE UP (ref 2.5–4.5)
PLATELET # BLD AUTO: 184 K/UL — SIGNIFICANT CHANGE UP (ref 150–400)
POTASSIUM SERPL-MCNC: 4.4 MMOL/L — SIGNIFICANT CHANGE UP (ref 3.5–5.3)
POTASSIUM SERPL-SCNC: 4.4 MMOL/L — SIGNIFICANT CHANGE UP (ref 3.5–5.3)
PROT UR-MCNC: 15 MG/DL
PROTHROM AB SERPL-ACNC: 13.2 SEC — SIGNIFICANT CHANGE UP (ref 10.5–13.4)
RBC # BLD: 3.47 M/UL — LOW (ref 3.8–5.2)
RBC # FLD: 15.5 % — HIGH (ref 10.3–14.5)
RBC CASTS # UR COMP ASSIST: ABNORMAL /HPF (ref 0–4)
SODIUM SERPL-SCNC: 140 MMOL/L — SIGNIFICANT CHANGE UP (ref 135–145)
SP GR SPEC: 1.01 — SIGNIFICANT CHANGE UP (ref 1.01–1.02)
SPECIMEN SOURCE: SIGNIFICANT CHANGE UP
UROBILINOGEN FLD QL: 1 MG/DL
WBC # BLD: 4.28 K/UL — SIGNIFICANT CHANGE UP (ref 3.8–10.5)
WBC # FLD AUTO: 4.28 K/UL — SIGNIFICANT CHANGE UP (ref 3.8–10.5)
WBC UR QL: SIGNIFICANT CHANGE UP

## 2022-06-13 PROCEDURE — 71045 X-RAY EXAM CHEST 1 VIEW: CPT | Mod: 26

## 2022-06-13 PROCEDURE — 99232 SBSQ HOSP IP/OBS MODERATE 35: CPT

## 2022-06-13 PROCEDURE — 20611 DRAIN/INJ JOINT/BURSA W/US: CPT | Mod: RT

## 2022-06-13 PROCEDURE — 90792 PSYCH DIAG EVAL W/MED SRVCS: CPT

## 2022-06-13 PROCEDURE — 76882 US LMTD JT/FCL EVL NVASC XTR: CPT | Mod: 26,RT

## 2022-06-13 RX ORDER — MECLIZINE HCL 12.5 MG
25 TABLET ORAL ONCE
Refills: 0 | Status: COMPLETED | OUTPATIENT
Start: 2022-06-13 | End: 2022-06-13

## 2022-06-13 RX ORDER — DIAZEPAM 5 MG
5 TABLET ORAL ONCE
Refills: 0 | Status: DISCONTINUED | OUTPATIENT
Start: 2022-06-13 | End: 2022-06-13

## 2022-06-13 RX ORDER — APIXABAN 2.5 MG/1
5 TABLET, FILM COATED ORAL EVERY 12 HOURS
Refills: 0 | Status: DISCONTINUED | OUTPATIENT
Start: 2022-06-13 | End: 2022-06-16

## 2022-06-13 RX ORDER — OXYCODONE HYDROCHLORIDE 5 MG/1
5 TABLET ORAL ONCE
Refills: 0 | Status: DISCONTINUED | OUTPATIENT
Start: 2022-06-13 | End: 2022-06-13

## 2022-06-13 RX ADMIN — QUETIAPINE FUMARATE 200 MILLIGRAM(S): 200 TABLET, FILM COATED ORAL at 21:11

## 2022-06-13 RX ADMIN — DIVALPROEX SODIUM 500 MILLIGRAM(S): 500 TABLET, DELAYED RELEASE ORAL at 11:35

## 2022-06-13 RX ADMIN — ARIPIPRAZOLE 30 MILLIGRAM(S): 15 TABLET ORAL at 11:45

## 2022-06-13 RX ADMIN — LURASIDONE HYDROCHLORIDE 40 MILLIGRAM(S): 40 TABLET ORAL at 11:37

## 2022-06-13 RX ADMIN — APIXABAN 5 MILLIGRAM(S): 2.5 TABLET, FILM COATED ORAL at 19:34

## 2022-06-13 RX ADMIN — OXYCODONE AND ACETAMINOPHEN 2 TABLET(S): 5; 325 TABLET ORAL at 19:15

## 2022-06-13 RX ADMIN — Medication 25 MILLIGRAM(S): at 17:35

## 2022-06-13 RX ADMIN — Medication 25 MILLIGRAM(S): at 06:22

## 2022-06-13 RX ADMIN — Medication 25 MILLIGRAM(S): at 12:49

## 2022-06-13 RX ADMIN — OXYCODONE AND ACETAMINOPHEN 2 TABLET(S): 5; 325 TABLET ORAL at 12:49

## 2022-06-13 RX ADMIN — DIVALPROEX SODIUM 1000 MILLIGRAM(S): 500 TABLET, DELAYED RELEASE ORAL at 21:13

## 2022-06-13 RX ADMIN — Medication 25 MILLIGRAM(S): at 00:17

## 2022-06-13 RX ADMIN — OXYCODONE AND ACETAMINOPHEN 2 TABLET(S): 5; 325 TABLET ORAL at 01:06

## 2022-06-13 RX ADMIN — OXYCODONE AND ACETAMINOPHEN 2 TABLET(S): 5; 325 TABLET ORAL at 06:23

## 2022-06-13 RX ADMIN — Medication 166.67 MILLIGRAM(S): at 17:36

## 2022-06-13 RX ADMIN — Medication 5 MILLIGRAM(S): at 09:55

## 2022-06-13 RX ADMIN — OXYCODONE AND ACETAMINOPHEN 2 TABLET(S): 5; 325 TABLET ORAL at 20:10

## 2022-06-13 RX ADMIN — Medication 0.5 MILLIGRAM(S): at 06:22

## 2022-06-13 RX ADMIN — Medication 75 MICROGRAM(S): at 06:41

## 2022-06-13 RX ADMIN — MOMETASONE FUROATE 1 PUFF(S): 220 INHALANT RESPIRATORY (INHALATION) at 11:38

## 2022-06-13 RX ADMIN — OXYCODONE AND ACETAMINOPHEN 2 TABLET(S): 5; 325 TABLET ORAL at 13:40

## 2022-06-13 RX ADMIN — QUETIAPINE FUMARATE 200 MILLIGRAM(S): 200 TABLET, FILM COATED ORAL at 11:36

## 2022-06-13 RX ADMIN — OXYCODONE AND ACETAMINOPHEN 2 TABLET(S): 5; 325 TABLET ORAL at 00:17

## 2022-06-13 RX ADMIN — OXYCODONE HYDROCHLORIDE 5 MILLIGRAM(S): 5 TABLET ORAL at 16:47

## 2022-06-13 RX ADMIN — Medication 0.5 MILLIGRAM(S): at 17:32

## 2022-06-13 RX ADMIN — ALBUTEROL 2 PUFF(S): 90 AEROSOL, METERED ORAL at 06:53

## 2022-06-13 RX ADMIN — OXYCODONE HYDROCHLORIDE 5 MILLIGRAM(S): 5 TABLET ORAL at 15:50

## 2022-06-13 RX ADMIN — Medication 166.67 MILLIGRAM(S): at 06:22

## 2022-06-13 RX ADMIN — Medication 25 MILLIGRAM(S): at 19:34

## 2022-06-13 NOTE — BH CONSULTATION LIAISON ASSESSMENT NOTE - NSBHCHARTREVIEWVS_PSY_A_CORE FT
Vital Signs Last 24 Hrs  T(C): 36.6 (13 Jun 2022 11:27), Max: 36.8 (13 Jun 2022 05:18)  T(F): 97.9 (13 Jun 2022 11:27), Max: 98.3 (13 Jun 2022 05:18)  HR: 99 (13 Jun 2022 11:27) (90 - 121)  BP: 121/89 (13 Jun 2022 11:27) (100/60 - 121/89)  BP(mean): --  RR: 18 (13 Jun 2022 11:27) (18 - 18)  SpO2: 100% (13 Jun 2022 11:27) (95% - 100%)

## 2022-06-13 NOTE — PROCEDURE NOTE - PROCEDURE FINDINGS AND DETAILS
area prepped with chlorhexidine, draped in sterile fashion and administered lidocaine. Using ultrasound guidance aspiration of 15 cc of mildly cloudy serosanguinous drainage. Gauze and sterile dressing placed. area prepped with chlorhexidine, draped in sterile fashion and administered lidocaine. Using ultrasound guidance aspiration of 15 cc of mildly cloudy serosanguinous drainage from a right shoulder subq collection. Gauze and sterile dressing placed.

## 2022-06-13 NOTE — BH CONSULTATION LIAISON ASSESSMENT NOTE - CURRENT MEDICATION
MEDICATIONS  (STANDING):  ARIPiprazole 30 milliGRAM(s) Oral daily  diVALproex  milliGRAM(s) Oral <User Schedule>  diVALproex ER 1000 milliGRAM(s) Oral <User Schedule>  influenza   Vaccine 0.5 milliLiter(s) IntraMuscular once  levothyroxine 75 MICROGram(s) Oral daily  LORazepam     Tablet 0.5 milliGRAM(s) Oral two times a day  lurasidone 40 milliGRAM(s) Oral daily  mometasone 220 MICROgram(s) Inhaler 1 Puff(s) Inhalation daily  pantoprazole    Tablet 40 milliGRAM(s) Oral before breakfast  QUEtiapine 50 milliGRAM(s) Oral <User Schedule>  QUEtiapine 200 milliGRAM(s) Oral <User Schedule>  vancomycin  IVPB 1250 milliGRAM(s) IV Intermittent every 12 hours    MEDICATIONS  (PRN):  acetaminophen     Tablet .. 650 milliGRAM(s) Oral every 6 hours PRN Temp greater or equal to 38C (100.4F), Mild Pain (1 - 3)  ALBUTerol    90 MICROgram(s) HFA Inhaler 2 Puff(s) Inhalation every 6 hours PRN Shortness of Breath and/or Wheezing  diphenhydrAMINE 25 milliGRAM(s) Oral every 6 hours PRN Rash and/or Itching  melatonin 3 milliGRAM(s) Oral at bedtime PRN Insomnia  oxycodone    5 mG/acetaminophen 325 mG 2 Tablet(s) Oral every 6 hours PRN Moderate Pain (4 - 6)

## 2022-06-13 NOTE — BH CONSULTATION LIAISON ASSESSMENT NOTE - OTHER
chronically low end of fair to limited  concrete  + reactive  adequate during exam  tearful with dramatic flair  peers  "it hurts!"

## 2022-06-13 NOTE — PROGRESS NOTE ADULT - SUBJECTIVE AND OBJECTIVE BOX
36 years old female with history of bipolar disorder, hypothyroidism, factor V Leiden with history of VTE on apixaban, seizure disorder presented w/ right shoulder pain for 1 week after being given IM medication on right shoulder 1-2 week ago & was admitted with right shoulder fluid collection. She is lying in bed in NAD.      MEDICATIONS  (STANDING):  ARIPiprazole 30 milliGRAM(s) Oral daily  diVALproex  milliGRAM(s) Oral <User Schedule>  diVALproex ER 1000 milliGRAM(s) Oral <User Schedule>  influenza   Vaccine 0.5 milliLiter(s) IntraMuscular once  levothyroxine 75 MICROGram(s) Oral daily  LORazepam     Tablet 0.5 milliGRAM(s) Oral two times a day  lurasidone 40 milliGRAM(s) Oral daily  meclizine 25 milliGRAM(s) Oral once  mometasone 220 MICROgram(s) Inhaler 1 Puff(s) Inhalation daily  pantoprazole    Tablet 40 milliGRAM(s) Oral before breakfast  QUEtiapine 50 milliGRAM(s) Oral <User Schedule>  QUEtiapine 200 milliGRAM(s) Oral <User Schedule>  vancomycin  IVPB 1250 milliGRAM(s) IV Intermittent every 12 hours    MEDICATIONS  (PRN):  acetaminophen     Tablet .. 650 milliGRAM(s) Oral every 6 hours PRN Temp greater or equal to 38C (100.4F), Mild Pain (1 - 3)  ALBUTerol    90 MICROgram(s) HFA Inhaler 2 Puff(s) Inhalation every 6 hours PRN Shortness of Breath and/or Wheezing  diphenhydrAMINE 25 milliGRAM(s) Oral every 6 hours PRN Rash and/or Itching  melatonin 3 milliGRAM(s) Oral at bedtime PRN Insomnia  oxycodone    5 mG/acetaminophen 325 mG 2 Tablet(s) Oral every 6 hours PRN Moderate Pain (4 - 6)      Allergies    latex (Blisters)  penicillin (Hives)  penicillin (Other)  pineapple (Unknown)  Toradol (Rash)  Toradol (Unknown)  Zofran (Hives)  Zofran (Unknown)    Intolerances        Vital Signs Last 24 Hrs  T(C): 36.7 (13 Jun 2022 17:15), Max: 36.8 (13 Jun 2022 05:18)  T(F): 98 (13 Jun 2022 17:15), Max: 98.3 (13 Jun 2022 05:18)  HR: 108 (13 Jun 2022 17:15) (90 - 121)  BP: 95/61 (13 Jun 2022 17:15) (95/61 - 121/89)  BP(mean): --  RR: 18 (13 Jun 2022 17:15) (18 - 18)  SpO2: 93% (13 Jun 2022 17:15) (93% - 100%)    PHYSICAL EXAM:  GENERAL: NAD, well-groomed, well-developed  HEAD:  Atraumatic, Normocephalic  EYES: EOMI, PERRLA, conjunctiva and sclera clear  ENMT: No tonsillar erythema, exudates, or enlargement; Moist mucous membranes, Good dentition, No lesions  NECK: Supple, No JVD, Normal thyroid  NERVOUS SYSTEM:  Alert & Oriented X3, Good concentration; Motor Strength 5/5 B/L upper and lower extremities; DTRs 2+ intact and symmetric  CHEST/LUNG: Clear to auscultation bilaterally; No rales, rhonchi, wheezing, or rubs  HEART: Regular rate and rhythm; No murmurs, rubs, or gallops  ABDOMEN: Soft, Nontender, Nondistended; Bowel sounds present  EXTREMITIES: right shoulder is tender w/ fluctuance      LABS:                        10.3   4.28  )-----------( 184      ( 13 Jun 2022 05:34 )             32.6     06-13    140  |  108  |  12  ----------------------------<  89  4.4   |  25  |  0.70    Ca    8.9      13 Jun 2022 05:34  Phos  4.0     06-13  Mg     2.2     06-13      PT/INR - ( 13 Jun 2022 05:34 )   PT: 13.2 sec;   INR: 1.11 ratio         PTT - ( 13 Jun 2022 05:34 )  PTT:30.5 sec    CAPILLARY BLOOD GLUCOSE      POCT Blood Glucose.: 129 mg/dL (12 Jun 2022 19:40)      Culture - Abscess with Gram Stain (collected 10 Júnior 2022 10:39)  Source: .Abscess Arm - Right  Preliminary Report (11 Jun 2022 14:23):    No growth    Culture - Blood (collected 10 Júnior 2022 10:35)  Source: .Blood Blood-Peripheral  Preliminary Report (11 Jun 2022 11:01):    No growth to date.    Culture - Blood (collected 10 Júnior 2022 10:35)  Source: .Blood Blood-Peripheral  Preliminary Report (11 Jun 2022 11:01):    No growth to date.      RADIOLOGY & ADDITIONAL TESTS:    06-12-22 @ 07:01  -  06-13-22 @ 07:00  --------------------------------------------------------  IN:    IV PiggyBack: 250 mL  Total IN: 250 mL    OUT:  Total OUT: 0 mL    Total NET: 250 mL       36 years old female with history of bipolar disorder, hypothyroidism, factor V Leiden with history of VTE on apixaban, seizure disorder presented w/ right shoulder pain for 1 week after being given IM medication on right shoulder 1-2 week ago & was admitted with right shoulder fluid collection. She is lying in bed in NAD.      MEDICATIONS  (STANDING):  ARIPiprazole 30 milliGRAM(s) Oral daily  diVALproex  milliGRAM(s) Oral <User Schedule>  diVALproex ER 1000 milliGRAM(s) Oral <User Schedule>  influenza   Vaccine 0.5 milliLiter(s) IntraMuscular once  levothyroxine 75 MICROGram(s) Oral daily  LORazepam     Tablet 0.5 milliGRAM(s) Oral two times a day  lurasidone 40 milliGRAM(s) Oral daily  meclizine 25 milliGRAM(s) Oral once  mometasone 220 MICROgram(s) Inhaler 1 Puff(s) Inhalation daily  pantoprazole    Tablet 40 milliGRAM(s) Oral before breakfast  QUEtiapine 50 milliGRAM(s) Oral <User Schedule>  QUEtiapine 200 milliGRAM(s) Oral <User Schedule>  vancomycin  IVPB 1250 milliGRAM(s) IV Intermittent every 12 hours    MEDICATIONS  (PRN):  acetaminophen     Tablet .. 650 milliGRAM(s) Oral every 6 hours PRN Temp greater or equal to 38C (100.4F), Mild Pain (1 - 3)  ALBUTerol    90 MICROgram(s) HFA Inhaler 2 Puff(s) Inhalation every 6 hours PRN Shortness of Breath and/or Wheezing  diphenhydrAMINE 25 milliGRAM(s) Oral every 6 hours PRN Rash and/or Itching  melatonin 3 milliGRAM(s) Oral at bedtime PRN Insomnia  oxycodone    5 mG/acetaminophen 325 mG 2 Tablet(s) Oral every 6 hours PRN Moderate Pain (4 - 6)      Allergies    latex (Blisters)  penicillin (Hives)  penicillin (Other)  pineapple (Unknown)  Toradol (Rash)  Toradol (Unknown)  Zofran (Hives)  Zofran (Unknown)    Intolerances        Vital Signs Last 24 Hrs  T(C): 36.7 (13 Jun 2022 17:15), Max: 36.8 (13 Jun 2022 05:18)  T(F): 98 (13 Jun 2022 17:15), Max: 98.3 (13 Jun 2022 05:18)  HR: 108 (13 Jun 2022 17:15) (90 - 121)  BP: 95/61 (13 Jun 2022 17:15) (95/61 - 121/89)   RR: 18 (13 Jun 2022 17:15) (18 - 18)  SpO2: 93% (13 Jun 2022 17:15) (93% - 100%)    PHYSICAL EXAM:  GENERAL: NAD, well-groomed, well-developed  HEAD:  Atraumatic, Normocephalic  EYES: EOMI, PERRLA, conjunctiva and sclera clear  ENMT: No tonsillar erythema, exudates, or enlargement; Moist mucous membranes, Good dentition, No lesions  NECK: Supple, No JVD, Normal thyroid  NERVOUS SYSTEM:  Alert & Oriented X3, Good concentration; Motor Strength 5/5 B/L upper and lower extremities; DTRs 2+ intact and symmetric  CHEST/LUNG: Clear to auscultation bilaterally; No rales, rhonchi, wheezing, or rubs  HEART: Regular rate and rhythm; No murmurs, rubs, or gallops  ABDOMEN: Soft, Nontender, Nondistended; Bowel sounds present  EXTREMITIES: right shoulder is tender       LABS:                        10.3   4.28  )-----------( 184      ( 13 Jun 2022 05:34 )             32.6     06-13    140  |  108  |  12  ----------------------------<  89  4.4   |  25  |  0.70    Ca    8.9      13 Jun 2022 05:34  Phos  4.0     06-13  Mg     2.2     06-13      PT/INR - ( 13 Jun 2022 05:34 )   PT: 13.2 sec;   INR: 1.11 ratio         PTT - ( 13 Jun 2022 05:34 )  PTT:30.5 sec    CAPILLARY BLOOD GLUCOSE      POCT Blood Glucose.: 129 mg/dL (12 Jun 2022 19:40)      Culture - Abscess with Gram Stain (collected 10 Júnior 2022 10:39)  Source: .Abscess Arm - Right  Preliminary Report (11 Jun 2022 14:23):    No growth    Culture - Blood (collected 10 Júnior 2022 10:35)  Source: .Blood Blood-Peripheral  Preliminary Report (11 Jun 2022 11:01):    No growth to date.    Culture - Blood (collected 10 Júnior 2022 10:35)  Source: .Blood Blood-Peripheral  Preliminary Report (11 Jun 2022 11:01):    No growth to date.      RADIOLOGY & ADDITIONAL TESTS:    06-12-22 @ 07:01  -  06-13-22 @ 07:00  --------------------------------------------------------  IN:    IV PiggyBack: 250 mL  Total IN: 250 mL    OUT:  Total OUT: 0 mL    Total NET: 250 mL

## 2022-06-13 NOTE — PROGRESS NOTE ADULT - ASSESSMENT
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 Reported intermittent shooting 10/10 pain associated with chills. ? Had IM medication on right shoulder 1-2 week ago.     Found to have right deltoid fluid collection, with CT showing 6.5 x 4.2 x 4.4 cm collection. Unclear if infectious vs hematoma, plan for IR aspiration. S/p I&D in the ED, with 40ml purulent drainage obtained per procedure note. However fluid cultures currently no growth. Blood cultures also currently no growth. She has no fevers but has some leukopenia, WBC stable at 3.7.     6/13: s/p RRT yesterday evening - pseudoseizure, no fevers, tachycardic, on RA, no WBC, BCS with no growth to date, abscess cx - no growth thus far, Cr ok, s/p IR drainage today, cultures were sent, Vancomycin IV continued. Left LE is tender on exam - US venous doppler study ordered; complained of mild dysuria, UA ordered.     -continue vancomycin for now  - awaiting for cultures s/p IR procedure performed today 6/13  - US venous doppler of b/l LEs to r/o DVT  - obtain UA  - follow cultures to completion  - CXR - no acute findings      Msg sent to Dr. Baker

## 2022-06-13 NOTE — PROCEDURE NOTE - NSINFORMCONSENT_GEN_A_CORE
108
Benefits, risks, and possible complications of procedure explained to patient/caregiver who verbalized understanding and gave written consent.
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
## 2022-06-13 NOTE — BH CONSULTATION LIAISON ASSESSMENT NOTE - NSBHPSYCHMEDSHX_PSY_A_CORE
Detail Level: Simple Has The Patient Been Vaccinated For Covid-19?: Yes Which Covid 19 Vaccine Did Patient Receive (Optional)?: Moderna Has The Patient Been Infected With Covid-19 In The Past?: No Previous Infection Text: The patient has recovered from a previous COVID-19 infection.\\n2/21 yes...

## 2022-06-13 NOTE — BH CONSULTATION LIAISON ASSESSMENT NOTE - NSBHCHARTREVIEWINVESTIGATE_PSY_A_CORE FT
CT Shoulder w/ IV Cont, Right (06.10.22 @ 01:32) Fluid collection of the lateral deltoid with lobulated components   extending towards the cutaneous surface and measures approximately 6.5 x 4.2 x 4.4 cm.

## 2022-06-13 NOTE — CHART NOTE - NSCHARTNOTEFT_GEN_A_CORE
requested to see patient for abnormal movements. Patient currently at IR for a procedures. on VPA /1000  Patient with history of seizures and pseudoseizures. PER rapid team movements most consistent with pseudoseizure.    Will reattempt to examine tomorrow  Consider VPA level in am  Consider REEG

## 2022-06-13 NOTE — PROGRESS NOTE ADULT - ASSESSMENT
36 years old female with history of bipolar disorder, hypothyroidism, factor V Leiden with history of VTE on apixaban, seizure disorder presented w/ right shoulder pain for 1 week after being given IM medication on right shoulder 1-2 week ago & was admitted with right shoulder fluid collection.    Collection in right shoulder  - CT shoulder with fluid collection 6.5x 4.2x4.4cm at lateral deltoid region    - status post I&D in ED with 40ml of purulent drainage  - c/w IV vancomycin  - NGTD on aspirate culture - suspect hematoma  - as per surgery, will get IR left deltoid fluid aspiration pending repeat ultrasound   -c/w oral percocet    Seizure disorder  - c/w Depakote    - patient had a seizure episode today - but looked more like pseudoseizure (she has a history of pseudoseizures and aggressive behavior) - will consult neurology and psych   - check EEG     Bipolar disorder  - c/w Aripiprazole, Latuda, Depakote, quetiapine & ativan    Hypothyroidism  - c/w Synthroid      Factor V Leiden w/ DVT  - Eliquis on hold for aspiration    Asthma  - c/w Asthmanex and albuterol PRN    Prophylaxis:  DVT: Eliquis on hold for aspiration  GI: Protonix   36 years old female with history of bipolar disorder, hypothyroidism, factor V Leiden with history of VTE on apixaban, seizure disorder presented w/ right shoulder pain for 1 week after being given IM medication on right shoulder 1-2 week ago & was admitted with right shoulder fluid collection.    Collection in right shoulder  - CT shoulder with fluid collection 6.5x 4.2x4.4cm at lateral deltoid region    - status post I&D in ED with 40ml of purulent drainage  - IR drained 15 cc of mildly cloudy serosanguinous drainage on 6/13  - c/w IV vancomycin  - NGTD on aspirate culture - suspect hematoma  - as per surgery, will get IR left deltoid fluid aspiration pending repeat ultrasound   - c/w oral percocet    Seizure disorder  - c/w Depakote    - patient had a seizure episode today - but looked more like pseudoseizure (she has a history of pseudoseizures and aggressive behavior)    - check EEG   - neurology consulted  - psych note read and appreciated     Bipolar disorder  - c/w Aripiprazole, Latuda, Depakote, quetiapine & ativan  - psych note read and appreciated     Hypothyroidism  - c/w Synthroid      Factor V Leiden w/ DVT  - c/w Eliquis     Asthma  - c/w Asthmanex and albuterol PRN    Prophylaxis:  DVT: Eliquis   GI: Protonix    Called and updated Carol the  from [facility] ([phone]).

## 2022-06-13 NOTE — BH CONSULTATION LIAISON ASSESSMENT NOTE - NSBHCONSULTRECOMMENDOTHER_PSY_A_CORE FT
continue Seroquel 200mg @ 8am, 50mg @ 12pm, 200mg @ 8pm; Abilify 30mg daily, lurasidone 40mg po daily  depakote 500mg po in am and 1000 PO qhs, Lorazepam PO 0.5mg bid, thorazine 50mg q6h PRN,  Tizanidine 2mg bid PRN, Levothyroxine 75mg daily, Pantoprazole 40 mg AM  - thorazine 50mg IM q4hrs PRN for agitation with Ativan 2mg IM q4hrs PRN  - ordered Percocet for Patient; may very well have throat discomfort s/p intubation   - Patient has a legal guardian Maria Elena Kevin [phone]; Patient CANNOT refuse transfer back to group home when medically stable

## 2022-06-13 NOTE — BH CONSULTATION LIAISON ASSESSMENT NOTE - NSBHCHARTREVIEWLAB_PSY_A_CORE FT
06-13    140  |  108  |  12  ----------------------------<  89  4.4   |  25  |  0.70    Ca    8.9      13 Jun 2022 05:34  Phos  4.0     06-13  Mg     2.2     06-13

## 2022-06-13 NOTE — BH CONSULTATION LIAISON ASSESSMENT NOTE - SUMMARY
History involving aggressive, intentional behavior which is Pt's well known baseline and is NOT secondary to a primary psychotic or mood process but a manifestation of Patient's Axis II pathologies is likely to nonetheless occur. Patient has learned behavior of using her size to physically intimidate others and get what she wants, be it certain medications, food items or sabotage return to group home.  This admission - clinical presentation much better than usual. + History involving aggressive, intentional behavior which is Pt's well known baseline and is NOT secondary to a primary psychotic or mood process but a manifestation of Patient's Axis II pathologies is likely to nonetheless occur. Patient has learned behavior of using her size to physically intimidate others and get what she wants, be it certain medications, food items or sabotage return to group home.

## 2022-06-13 NOTE — PROGRESS NOTE ADULT - SUBJECTIVE AND OBJECTIVE BOX
YODIT MORROW  MRN-[id number]    Follow Up:  right deltoid abscess vs hematoma    Interval History: The pt was seen and examined earlier, no distress, the pt is s/p IR procedure - aspirated 15 cc of mildly cloudy serosanguinous drainage. The pt complains of pain in her right upper arm, RN will give pain medication. The pt is also complaining of pain in her left LE and mild discomfort with urination. The pt is afebrile, tachycardic, on RA, no WBC.      PAST MEDICAL & SURGICAL HISTORY:  Asthma      Seizure      Factor V Leiden      Bipolar disorder      Endometriosis      History of DVT in adulthood  RLE on eliquis      Hypothyroid      Seasonal allergies      Insomnia      GERD (gastroesophageal reflux disease)      No significant past surgical history          ROS:    [ ] Unobtainable because:  [x ] All other systems negative    Constitutional: no fever, no chills  Head: no trauma  Eyes: no vision changes, no eye pain  ENT:  no sore throat, no rhinorrhea  Cardiovascular:  no chest pain, no palpitation  Respiratory:  no SOB, no cough  GI:  no abd pain, no vomiting, no diarrhea  urinary: mild dysuria, no hematuria, no flank pain  musculoskeletal:  right upper arm pain, Left LE pain   skin:  no rash  neurology:  no headache, no seizure, no change in mental status  psych: no anxiety, no depression         Allergies  latex (Blisters)  penicillin (Hives)  penicillin (Other)  pineapple (Unknown)  Toradol (Rash)  Toradol (Unknown)  Zofran (Hives)  Zofran (Unknown)        ANTIMICROBIALS:  vancomycin  IVPB 1250 every 12 hours      OTHER MEDS:  acetaminophen     Tablet .. 650 milliGRAM(s) Oral every 6 hours PRN  ALBUTerol    90 MICROgram(s) HFA Inhaler 2 Puff(s) Inhalation every 6 hours PRN  ARIPiprazole 30 milliGRAM(s) Oral daily  diphenhydrAMINE 25 milliGRAM(s) Oral every 6 hours PRN  diVALproex  milliGRAM(s) Oral <User Schedule>  diVALproex ER 1000 milliGRAM(s) Oral <User Schedule>  influenza   Vaccine 0.5 milliLiter(s) IntraMuscular once  levothyroxine 75 MICROGram(s) Oral daily  LORazepam     Tablet 0.5 milliGRAM(s) Oral two times a day  lurasidone 40 milliGRAM(s) Oral daily  melatonin 3 milliGRAM(s) Oral at bedtime PRN  mometasone 220 MICROgram(s) Inhaler 1 Puff(s) Inhalation daily  oxycodone    5 mG/acetaminophen 325 mG 2 Tablet(s) Oral every 6 hours PRN  pantoprazole    Tablet 40 milliGRAM(s) Oral before breakfast  QUEtiapine 50 milliGRAM(s) Oral <User Schedule>  QUEtiapine 200 milliGRAM(s) Oral <User Schedule>      Vital Signs Last 24 Hrs  T(C): 36.6 (13 Jun 2022 11:27), Max: 36.8 (13 Jun 2022 05:18)  T(F): 97.9 (13 Jun 2022 11:27), Max: 98.3 (13 Jun 2022 05:18)  HR: 99 (13 Jun 2022 11:27) (90 - 121)  BP: 121/89 (13 Jun 2022 11:27) (100/60 - 121/89)  BP(mean): --  RR: 18 (13 Jun 2022 11:27) (18 - 18)  SpO2: 100% (13 Jun 2022 11:27) (95% - 100%)    Physical Exam:  General:    NAD,  non toxic, on RA  Head: atraumatic, normocephalic  Eye: normal sclera and conjunctiva  ENT:    no oral lesions, neck supple  Cardio:     regular S1, S2,  no murmur  Respiratory:    clear b/l,    no wheezing, no rhonchi, no rales  abd:     soft,   BS +,   no tenderness, not distended, no guarding, no rebound   :   no CVAT,  no suprapubic tenderness,   no  markham  Musculoskeletal:   no joint swelling,   trace edema of b/l LEs  vascular: no central lines, +PIV   Skin:    no rash, + tender lump on right deltoid, s/p IR drainage, dressing c/d/i  Neurologic:     no focal deficit, answers questions and follows commands  psych: flat affect during my exam, was anxious later when talking to her nurse        WBC Count: 4.28 K/uL (06-13 @ 05:34)  WBC Count: 3.70 K/uL (06-12 @ 08:01)  WBC Count: 3.76 K/uL (06-11 @ 10:32)  WBC Count: 5.38 K/uL (06-10 @ 00:52)                            10.3   4.28  )-----------( 184      ( 13 Jun 2022 05:34 )             32.6       06-13    140  |  108  |  12  ----------------------------<  89  4.4   |  25  |  0.70    Ca    8.9      13 Jun 2022 05:34  Phos  4.0     06-13  Mg     2.2     06-13            Creatinine Trend: 0.70<--, 0.80<--, 0.78<--, 0.94<--, 0.76<--, 0.78<--      MICROBIOLOGY:  v  .Abscess Arm - Right  06-10-22   No growth  --  --      .Blood Blood-Peripheral  06-10-22   No growth to date.  --  --      Clean Catch Clean Catch (Midstream)  06-04-22   >=3 organisms. Probable collection contamination.  --  --      .Blood Blood  05-24-22   No Growth Final  --  --      Clean Catch Clean Catch (Midstream)  05-23-22   >=3 organisms. Probable collection contamination.  --  --      SARS-CoV-2 Result: NotDetec (06-10-22 @ 08:29)    SARS-CoV-2: NotDetec (03 Jun 2022 14:31)  SARS-CoV-2: NotDetec (24 May 2022 16:40)  SARS-CoV-2: NotDetec (16 Apr 2022 18:56)  SARS-CoV-2: NotDetec (08 Apr 2022 00:53)  COVID-19 PCR: NotDetec (07 Apr 2022 11:25)  COVID-19 PCR: NotDetec (01 Apr 2022 19:37)  COVID-19 PCR: NotDetec (29 Dec 2021 18:23)    RADIOLOGY:

## 2022-06-14 LAB
ANION GAP SERPL CALC-SCNC: 6 MMOL/L — SIGNIFICANT CHANGE UP (ref 5–17)
BUN SERPL-MCNC: 9 MG/DL — SIGNIFICANT CHANGE UP (ref 7–23)
CALCIUM SERPL-MCNC: 9 MG/DL — SIGNIFICANT CHANGE UP (ref 8.5–10.1)
CHLORIDE SERPL-SCNC: 107 MMOL/L — SIGNIFICANT CHANGE UP (ref 96–108)
CO2 SERPL-SCNC: 26 MMOL/L — SIGNIFICANT CHANGE UP (ref 22–31)
CREAT SERPL-MCNC: 0.72 MG/DL — SIGNIFICANT CHANGE UP (ref 0.5–1.3)
EGFR: 112 ML/MIN/1.73M2 — SIGNIFICANT CHANGE UP
GLUCOSE SERPL-MCNC: 83 MG/DL — SIGNIFICANT CHANGE UP (ref 70–99)
HCT VFR BLD CALC: 33.3 % — LOW (ref 34.5–45)
HGB BLD-MCNC: 10.6 G/DL — LOW (ref 11.5–15.5)
MAGNESIUM SERPL-MCNC: 2 MG/DL — SIGNIFICANT CHANGE UP (ref 1.6–2.6)
MCHC RBC-ENTMCNC: 30 PG — SIGNIFICANT CHANGE UP (ref 27–34)
MCHC RBC-ENTMCNC: 31.8 G/DL — LOW (ref 32–36)
MCV RBC AUTO: 94.3 FL — SIGNIFICANT CHANGE UP (ref 80–100)
NRBC # BLD: 0 /100 WBCS — SIGNIFICANT CHANGE UP (ref 0–0)
PHOSPHATE SERPL-MCNC: 3.8 MG/DL — SIGNIFICANT CHANGE UP (ref 2.5–4.5)
PLATELET # BLD AUTO: 169 K/UL — SIGNIFICANT CHANGE UP (ref 150–400)
POTASSIUM SERPL-MCNC: 4.7 MMOL/L — SIGNIFICANT CHANGE UP (ref 3.5–5.3)
POTASSIUM SERPL-SCNC: 4.7 MMOL/L — SIGNIFICANT CHANGE UP (ref 3.5–5.3)
RAPID RVP RESULT: SIGNIFICANT CHANGE UP
RBC # BLD: 3.53 M/UL — LOW (ref 3.8–5.2)
RBC # FLD: 15.6 % — HIGH (ref 10.3–14.5)
SARS-COV-2 RNA SPEC QL NAA+PROBE: SIGNIFICANT CHANGE UP
SODIUM SERPL-SCNC: 139 MMOL/L — SIGNIFICANT CHANGE UP (ref 135–145)
VALPROATE SERPL-MCNC: 70 UG/ML — SIGNIFICANT CHANGE UP (ref 50–100)
VANCOMYCIN TROUGH SERPL-MCNC: 9.1 UG/ML — LOW (ref 10–20)
WBC # BLD: 3.9 K/UL — SIGNIFICANT CHANGE UP (ref 3.8–10.5)
WBC # FLD AUTO: 3.9 K/UL — SIGNIFICANT CHANGE UP (ref 3.8–10.5)

## 2022-06-14 PROCEDURE — 99232 SBSQ HOSP IP/OBS MODERATE 35: CPT

## 2022-06-14 PROCEDURE — 93970 EXTREMITY STUDY: CPT | Mod: 26

## 2022-06-14 PROCEDURE — 99231 SBSQ HOSP IP/OBS SF/LOW 25: CPT

## 2022-06-14 RX ORDER — METOCLOPRAMIDE HCL 10 MG
10 TABLET ORAL ONCE
Refills: 0 | Status: COMPLETED | OUTPATIENT
Start: 2022-06-14 | End: 2022-06-14

## 2022-06-14 RX ORDER — MECLIZINE HCL 12.5 MG
25 TABLET ORAL EVERY 6 HOURS
Refills: 0 | Status: DISCONTINUED | OUTPATIENT
Start: 2022-06-14 | End: 2022-06-16

## 2022-06-14 RX ORDER — DIPHENHYDRAMINE HCL 50 MG
50 CAPSULE ORAL ONCE
Refills: 0 | Status: COMPLETED | OUTPATIENT
Start: 2022-06-14 | End: 2022-06-14

## 2022-06-14 RX ADMIN — Medication 25 MILLIGRAM(S): at 07:59

## 2022-06-14 RX ADMIN — ALBUTEROL 2 PUFF(S): 90 AEROSOL, METERED ORAL at 02:52

## 2022-06-14 RX ADMIN — OXYCODONE AND ACETAMINOPHEN 2 TABLET(S): 5; 325 TABLET ORAL at 14:35

## 2022-06-14 RX ADMIN — Medication 0.5 MILLIGRAM(S): at 17:25

## 2022-06-14 RX ADMIN — Medication 0.5 MILLIGRAM(S): at 06:29

## 2022-06-14 RX ADMIN — OXYCODONE AND ACETAMINOPHEN 2 TABLET(S): 5; 325 TABLET ORAL at 07:17

## 2022-06-14 RX ADMIN — QUETIAPINE FUMARATE 50 MILLIGRAM(S): 200 TABLET, FILM COATED ORAL at 12:59

## 2022-06-14 RX ADMIN — Medication 25 MILLIGRAM(S): at 01:34

## 2022-06-14 RX ADMIN — Medication 166.67 MILLIGRAM(S): at 19:11

## 2022-06-14 RX ADMIN — APIXABAN 5 MILLIGRAM(S): 2.5 TABLET, FILM COATED ORAL at 06:29

## 2022-06-14 RX ADMIN — DIVALPROEX SODIUM 500 MILLIGRAM(S): 500 TABLET, DELAYED RELEASE ORAL at 10:54

## 2022-06-14 RX ADMIN — Medication 50 MILLIGRAM(S): at 13:34

## 2022-06-14 RX ADMIN — OXYCODONE AND ACETAMINOPHEN 2 TABLET(S): 5; 325 TABLET ORAL at 01:16

## 2022-06-14 RX ADMIN — Medication 25 MILLIGRAM(S): at 18:00

## 2022-06-14 RX ADMIN — QUETIAPINE FUMARATE 200 MILLIGRAM(S): 200 TABLET, FILM COATED ORAL at 20:07

## 2022-06-14 RX ADMIN — LURASIDONE HYDROCHLORIDE 40 MILLIGRAM(S): 40 TABLET ORAL at 13:01

## 2022-06-14 RX ADMIN — APIXABAN 5 MILLIGRAM(S): 2.5 TABLET, FILM COATED ORAL at 17:25

## 2022-06-14 RX ADMIN — OXYCODONE AND ACETAMINOPHEN 2 TABLET(S): 5; 325 TABLET ORAL at 13:35

## 2022-06-14 RX ADMIN — ARIPIPRAZOLE 30 MILLIGRAM(S): 15 TABLET ORAL at 13:01

## 2022-06-14 RX ADMIN — PANTOPRAZOLE SODIUM 40 MILLIGRAM(S): 20 TABLET, DELAYED RELEASE ORAL at 06:29

## 2022-06-14 RX ADMIN — DIVALPROEX SODIUM 1000 MILLIGRAM(S): 500 TABLET, DELAYED RELEASE ORAL at 21:16

## 2022-06-14 RX ADMIN — OXYCODONE AND ACETAMINOPHEN 2 TABLET(S): 5; 325 TABLET ORAL at 02:16

## 2022-06-14 RX ADMIN — Medication 75 MICROGRAM(S): at 06:29

## 2022-06-14 RX ADMIN — QUETIAPINE FUMARATE 200 MILLIGRAM(S): 200 TABLET, FILM COATED ORAL at 08:56

## 2022-06-14 RX ADMIN — OXYCODONE AND ACETAMINOPHEN 2 TABLET(S): 5; 325 TABLET ORAL at 19:37

## 2022-06-14 RX ADMIN — OXYCODONE AND ACETAMINOPHEN 2 TABLET(S): 5; 325 TABLET ORAL at 20:37

## 2022-06-14 RX ADMIN — OXYCODONE AND ACETAMINOPHEN 2 TABLET(S): 5; 325 TABLET ORAL at 08:17

## 2022-06-14 RX ADMIN — Medication 10 MILLIGRAM(S): at 09:39

## 2022-06-14 RX ADMIN — MOMETASONE FUROATE 1 PUFF(S): 220 INHALANT RESPIRATORY (INHALATION) at 13:00

## 2022-06-14 RX ADMIN — Medication 166.67 MILLIGRAM(S): at 06:29

## 2022-06-14 NOTE — BH CONSULTATION LIAISON PROGRESS NOTE - OTHER
adequate during exam  chronically low end of fair to limited  + reactive  tearful with dramatic flair  concrete  "it hurts!"

## 2022-06-14 NOTE — PROGRESS NOTE ADULT - SUBJECTIVE AND OBJECTIVE BOX
36 years old female with history of bipolar disorder, hypothyroidism, factor V Leiden with history of VTE on apixaban, seizure disorder presented w/ right shoulder pain for 1 week after being given IM medication on right shoulder 1-2 week ago & was admitted with right shoulder fluid collection. She is lying in bed in NAD.      MEDICATIONS  (STANDING):  apixaban 5 milliGRAM(s) Oral every 12 hours  ARIPiprazole 30 milliGRAM(s) Oral daily  diVALproex  milliGRAM(s) Oral <User Schedule>  diVALproex ER 1000 milliGRAM(s) Oral <User Schedule>  influenza   Vaccine 0.5 milliLiter(s) IntraMuscular once  levothyroxine 75 MICROGram(s) Oral daily  LORazepam     Tablet 0.5 milliGRAM(s) Oral two times a day  lurasidone 40 milliGRAM(s) Oral daily  mometasone 220 MICROgram(s) Inhaler 1 Puff(s) Inhalation daily  pantoprazole    Tablet 40 milliGRAM(s) Oral before breakfast  QUEtiapine 50 milliGRAM(s) Oral <User Schedule>  QUEtiapine 200 milliGRAM(s) Oral <User Schedule>  vancomycin  IVPB 1250 milliGRAM(s) IV Intermittent every 12 hours    MEDICATIONS  (PRN):  acetaminophen     Tablet .. 650 milliGRAM(s) Oral every 6 hours PRN Temp greater or equal to 38C (100.4F), Mild Pain (1 - 3)  ALBUTerol    90 MICROgram(s) HFA Inhaler 2 Puff(s) Inhalation every 6 hours PRN Shortness of Breath and/or Wheezing  diphenhydrAMINE 25 milliGRAM(s) Oral every 6 hours PRN Rash and/or Itching  melatonin 3 milliGRAM(s) Oral at bedtime PRN Insomnia  oxycodone    5 mG/acetaminophen 325 mG 2 Tablet(s) Oral every 6 hours PRN Moderate Pain (4 - 6)      Allergies    latex (Blisters)  penicillin (Hives)  penicillin (Other)  pineapple (Unknown)  Toradol (Rash)  Toradol (Unknown)  Zofran (Hives)  Zofran (Unknown)    Intolerances        Vital Signs Last 24 Hrs  T(C): 36.8 (2022 11:54), Max: 37.1 (2022 23:55)  T(F): 98.2 (2022 11:54), Max: 98.7 (2022 23:55)  HR: 91 (2022 11:54) (87 - 96)  BP: 126/78 (2022 11:54) (96/66 - 126/78)   RR: 18 (2022 11:54) (18 - 18)  SpO2: 97% (2022 11:54) (95% - 97%)    PHYSICAL EXAM:  GENERAL: NAD, well-groomed, well-developed  HEAD:  Atraumatic, Normocephalic  EYES: EOMI, PERRLA, conjunctiva and sclera clear  ENMT: No tonsillar erythema, exudates, or enlargement; Moist mucous membranes, Good dentition, No lesions  NECK: Supple, No JVD, Normal thyroid  NERVOUS SYSTEM:  Alert & Oriented X3, Good concentration; Motor Strength 5/5 B/L upper and lower extremities; DTRs 2+ intact and symmetric  CHEST/LUNG: Clear to auscultation bilaterally; No rales, rhonchi, wheezing, or rubs  HEART: Regular rate and rhythm; No murmurs, rubs, or gallops  ABDOMEN: Soft, Nontender, Nondistended; Bowel sounds present  EXTREMITIES: right shoulder is tender     LABS:                        10.6   3.90  )-----------( 169      ( 2022 07:02 )             33.3     06-14    139  |  107  |  9   ----------------------------<  83  4.7   |  26  |  0.72    Ca    9.0      2022 07:02  Phos  3.8     06-14  Mg     2.0     06-14      PT/INR - ( 2022 05:34 )   PT: 13.2 sec;   INR: 1.11 ratio         PTT - ( 2022 05:34 )  PTT:30.5 sec  Urinalysis Basic - ( 2022 20:28 )    Color: Yellow / Appearance: Clear / S.015 / pH: x  Gluc: x / Ketone: Trace  / Bili: Negative / Urobili: 1 mg/dL   Blood: x / Protein: 15 mg/dL / Nitrite: Negative   Leuk Esterase: Negative / RBC: 11-25 /HPF / WBC 0-2   Sq Epi: x / Non Sq Epi: Occasional / Bacteria: Moderate      CAPILLARY BLOOD GLUCOSE          Culture - Fungal, Body Fluid (collected 2022 19:00)  Source: .Body Fluid righ arm fluid collection  Preliminary Report (2022 07:35):    Testing in progress    Culture - Body Fluid with Gram Stain (collected 2022 19:00)  Source: .Body Fluid righ arm fluid collection  Gram Stain (prelim) (2022 17:14):    No polymorphonuclear cells seen per low power field    No organisms seen per oil power field  Preliminary Report (2022 17:14):    No growth    Culture - Abscess with Gram Stain (collected 10 Júnior 2022 10:39)  Source: .Abscess Arm - Right  Preliminary Report (2022 14:23):    No growth    Culture - Blood (collected 10 Júnior 2022 10:35)  Source: .Blood Blood-Peripheral  Preliminary Report (2022 11:01):    No growth to date.    Culture - Blood (collected 10 Júnior 2022 10:35)  Source: .Blood Blood-Peripheral  Preliminary Report (2022 11:01):    No growth to date.      RADIOLOGY & ADDITIONAL TESTS:    22 @ 07:01  -  22 @ 07:00  --------------------------------------------------------  IN:    IV PiggyBack: 250 mL    Oral Fluid: 200 mL  Total IN: 450 mL    OUT:    Voided (mL): 800 mL  Total OUT: 800 mL    Total NET: -350 mL

## 2022-06-14 NOTE — CONSULT NOTE ADULT - SUBJECTIVE AND OBJECTIVE BOX
Patient is a 36y old  Female who presents with a chief complaint of right shoulder abscess on 6/10/22. S/P aspiration in ER 40cc of reportedly drained from aspirate with NGTD. Patient complains of recurrent pain and swelling to region.       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 Reported intermittent shooting 10/10 pain associated with chills. ? Had IM medication on right shoulder 1-2 week ago. Patient also complain of more frequent migraine recently.   Hemodynamically stable, afebrile, sat well at RA. EKG with sinus tachy, , QTc 455. No leukocytosis. Plt 189, K 4.6, Cr 0.94, lactate 1.1. CT shoulder with fluid collection 6.5x 4.2x4.4cm at lateral deltoid region. CT head with no acute pathology. Mildly enlarged heterogenous pituitary gland with internal 6mm nodular hyperattetnuating, unchanged since 3/3/2022. 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 S/P I&D in ED with 40ml of purulent drainage.     SH: no toxic habits, lives in a group home.       PAST MEDICAL & SURGICAL HISTORY:  Asthma    Seizure    Factor V Leiden    Bipolar disorder    Endometriosis    History of PE/DVT in adulthood RLE on eliquis    Hypothyroid    Seasonal allergies    Insomnia    GERD (gastroesophageal reflux disease)    No significant past surgical history      Review of Systems:  I have reviewed 9 systems with the patient and the only positive findings were right shoulder pain x1 week with decreased ROM.     MEDICATIONS  (STANDING):  apixaban 5 milliGRAM(s) Oral every 12 hours  ARIPiprazole 30 milliGRAM(s) Oral daily  diVALproex  milliGRAM(s) Oral <User Schedule>  diVALproex ER 1000 milliGRAM(s) Oral <User Schedule>  influenza   Vaccine 0.5 milliLiter(s) IntraMuscular once  levothyroxine 75 MICROGram(s) Oral daily  LORazepam     Tablet 0.5 milliGRAM(s) Oral two times a day  lurasidone 40 milliGRAM(s) Oral daily  mometasone 220 MICROgram(s) Inhaler 1 Puff(s) Inhalation daily  pantoprazole    Tablet 40 milliGRAM(s) Oral before breakfast  QUEtiapine 50 milliGRAM(s) Oral <User Schedule>  QUEtiapine 200 milliGRAM(s) Oral <User Schedule>  vancomycin  IVPB 1250 milliGRAM(s) IV Intermittent every 12 hours    MEDICATIONS  (PRN):  acetaminophen     Tablet .. 650 milliGRAM(s) Oral every 6 hours PRN Temp greater or equal to 38C (100.4F), Mild Pain (1 - 3)  ALBUTerol    90 MICROgram(s) HFA Inhaler 2 Puff(s) Inhalation every 6 hours PRN Shortness of Breath and/or Wheezing  diphenhydrAMINE 25 milliGRAM(s) Oral every 6 hours PRN Rash and/or Itching  melatonin 3 milliGRAM(s) Oral at bedtime PRN Insomnia  morphine  - Injectable 2 milliGRAM(s) IV Push every 6 hours PRN Severe Pain (7 - 10)  oxycodone    5 mG/acetaminophen 325 mG 2 Tablet(s) Oral every 6 hours PRN Moderate Pain (4 - 6)      Allergies    latex (Blisters)  penicillin (Hives)  penicillin (Other)  pineapple (Unknown)  Toradol (Rash)  Toradol (Unknown)  Zofran (Hives)  Zofran (Unknown)          FAMILY HISTORY:  Family history of DVT (Mother)        Vital Signs Last 24 Hrs  T(C): 36.7 (12 Jun 2022 17:07), Max: 36.9 (12 Jun 2022 05:12)  T(F): 98.1 (12 Jun 2022 17:07), Max: 98.4 (12 Jun 2022 05:12)  HR: 102 (12 Jun 2022 17:07) (82 - 108)  BP: 111/77 (12 Jun 2022 17:07) (99/70 - 111/77)  BP(mean): --  RR: 18 (12 Jun 2022 17:07) (18 - 19)  SpO2: 95% (12 Jun 2022 17:07) (95% - 100%)    Physical Exam:  General:  Appears stated age, well-groomed, well-nourished, no distress  Eyes: EOMI, conjunctiva clear  HENT:  WNL, no JVD  Chest:  clear breath sounds  Cardiovascular:  Regular rate & rhythm  Abdomen:  soft nt nd +bsx4  Extremities:  + Right shoulder with palpable tenderness to lateral deltoid. +area of fluctuance noted. no pedal edema or calf tenderness noted  Skin:  hyperpigmentation to right deltoid.   Musculoskeletal:  normal strength  Neuro/Psych:  Alert, oriented to time, place and person     LABS:                        11.0   3.70  )-----------( 198      ( 12 Jun 2022 08:01 )             34.5     06-12    139  |  106  |  11  ----------------------------<  80  4.7   |  27  |  0.80    Ca    9.1      12 Jun 2022 08:01  Phos  3.4     06-11  Mg     2.1     06-11    TPro  7.3  /  Alb  3.3  /  TBili  0.3  /  DBili  x   /  AST  35  /  ALT  45  /  AlkPhos  48  06-11        Culture Results:   No growth (06-10 @ 10:39)  Culture Results:   No growth to date. (06-10 @ 10:35)  Culture Results:   No growth to date. (06-10 @ 10:35)      RADIOLOGY & ADDITIONAL STUDIES:  < from: CT Shoulder w/ IV Cont, Right (06.10.22 @ 01:32) >  IMPRESSION:  1. Fluid collection of the lateral deltoid with lobulated components extending towards the cutaneous surface and measures approximately 6.5 x   4.2 x 4.4 cm.  2. Hematoma and abscess may be considered in the appropriate setting.   Consider aspiration as warranted.    --- End of Report ---      LUCI HACKETT MD; Attending Radiologist    < end of copied text >      Impression/Plan: 36 year old female with fluid collection in right lateral deltoid region.     patient is afebrile and without leukocytosis.- collection likely hematoma given eliquis  recommend IR evaluation for possible aspiration.   no acute surgical intervention at this time as it is likely sterile- cx NGTD, risk of infection to perform open I&D recommend IR aspiration.   discussed with Dr. Rodríguez    
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 Reported intermittent shooting 10/10 pain associated with chills. ? Had IM medication on right shoulder 1-2 week ago. Patient also complain of more frequent migraine recently.   Hemodynamically stable, afebrile, sat well at RA. EKG with sinus tachy, , QTc 455. No leukocytosis. Plt 189, K 4.6, Cr 0.94, lactate 1.1. CT shoulder with fluid collection 6.5x 4.2x4.4cm at lateral deltoid region. CT head with no acute pathology. Mildly enlarged heterogenous pituitary gland with internal 6mm nodular hyperattetnuating, unchanged since 3/3/2022. 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 S/P I&D in ED with 40ml of purulent drainage.     SH: no toxic habits (10 Júnior 2022 11:54)    seizure like event 6/ evening. Patient c/o vertigo with moving head right ear fullness  MEDICATIONS    acetaminophen     Tablet .. 650 milliGRAM(s) Oral every 6 hours PRN  ALBUTerol    90 MICROgram(s) HFA Inhaler 2 Puff(s) Inhalation every 6 hours PRN  apixaban 5 milliGRAM(s) Oral every 12 hours  ARIPiprazole 30 milliGRAM(s) Oral daily  diphenhydrAMINE 25 milliGRAM(s) Oral every 6 hours PRN  diVALproex  milliGRAM(s) Oral <User Schedule>  diVALproex ER 1000 milliGRAM(s) Oral <User Schedule>  influenza   Vaccine 0.5 milliLiter(s) IntraMuscular once  levothyroxine 75 MICROGram(s) Oral daily  LORazepam     Tablet 0.5 milliGRAM(s) Oral two times a day  lurasidone 40 milliGRAM(s) Oral daily  melatonin 3 milliGRAM(s) Oral at bedtime PRN  mometasone 220 MICROgram(s) Inhaler 1 Puff(s) Inhalation daily  oxycodone    5 mG/acetaminophen 325 mG 2 Tablet(s) Oral every 6 hours PRN  pantoprazole    Tablet 40 milliGRAM(s) Oral before breakfast  QUEtiapine 50 milliGRAM(s) Oral <User Schedule>  QUEtiapine 200 milliGRAM(s) Oral <User Schedule>  vancomycin  IVPB 1250 milliGRAM(s) IV Intermittent every 12 hours      PMH: Asthma    Seizure    Factor V Leiden    Bipolar disorder    Endometriosis    History of DVT in adulthood    Seizures    Hypothyroid    Seasonal allergies    Insomnia    GERD (gastroesophageal reflux disease)         PSH: No significant past surgical history        Family history: No history of dementia, strokes, or seizures   FAMILY HISTORY:  Family history of DVT (Mother)        SOCIAL HISTORY:  No history of tobacco or alcohol use     Allergies    latex (Blisters)  penicillin (Hives)  penicillin (Other)  pineapple (Unknown)  Toradol (Rash)  Toradol (Unknown)  Zofran (Hives)  Zofran (Unknown)    Intolerances            Vital Signs Last 24 Hrs  T(C): 36.2 (2022 05:13), Max: 37.1 (2022 23:55)  T(F): 97.1 (2022 05:13), Max: 98.7 (2022 23:55)  HR: 96 (2022 05:13) (87 - 109)  BP: 103/73 (2022 05:13) (95/61 - 121/89)  BP(mean): --  RR: 18 (2022 05:13) (18 - 18)  SpO2: 95% (2022 05:13) (93% - 100%)      On Neurological Examination:    Mental Status - Patient is alert, awake, oriented X3. fluent, names, no dysarthria no aphasia Follows commands well and able to answer questions appropriately. Mood and affect  normal    Cranial Nerves - PERRL, EOMI, VFF, V1-V3 intact, no gross facial asymmetry, tongue/uvula midline    Motor Exam -   no drift     nml bulk/tone    Sensory    Intact to light touch and pinprick bilaterally    Coord: FTN intact bilaterally     Gait - deferred         LABS:  CBC Full  -  ( 2022 07:02 )  WBC Count : 3.90 K/uL  RBC Count : 3.53 M/uL  Hemoglobin : 10.6 g/dL  Hematocrit : 33.3 %  Platelet Count - Automated : 169 K/uL  Mean Cell Volume : 94.3 fl  Mean Cell Hemoglobin : 30.0 pg  Mean Cell Hemoglobin Concentration : 31.8 g/dL  Auto Neutrophil # : x  Auto Lymphocyte # : x  Auto Monocyte # : x  Auto Eosinophil # : x  Auto Basophil # : x  Auto Neutrophil % : x  Auto Lymphocyte % : x  Auto Monocyte % : x  Auto Eosinophil % : x  Auto Basophil % : x    Urinalysis Basic - ( 2022 20:28 )    Color: Yellow / Appearance: Clear / S.015 / pH: x  Gluc: x / Ketone: Trace  / Bili: Negative / Urobili: 1 mg/dL   Blood: x / Protein: 15 mg/dL / Nitrite: Negative   Leuk Esterase: Negative / RBC: 11-25 /HPF / WBC 0-2   Sq Epi: x / Non Sq Epi: Occasional / Bacteria: Moderate      -    139  |  107  |  9   ----------------------------<  83  4.7   |  26  |  0.72    Ca    9.0      2022 07:02  Phos  3.8     06-14  Mg     2.0     06-14        Hemoglobin A1C:       PT/INR - ( 2022 05:34 )   PT: 13.2 sec;   INR: 1.11 ratio         PTT - ( 2022 05:34 )  PTT:30.5 sec      RADIOLOGY  < from: CT Head No Cont (06.10.22 @ 01:05) >  IMPRESSION:  No CT evidence of acute intracranial pathology.    A mildly enlarged, heterogeneous pituitary gland with internal 6 mm   nodular hyperattenuating focus appears unchanged since CT of 2022;   based on MRI of 2022 this probably represents a   complex/proteinaceous Rathke cleft cyst.  A dedicated contrast-enhanced   pituitary MRImay be obtained as an outpatient to confirm the diagnosis   and to exclude any suspicious enhancement.    --- End of Report ---    < end of copied text >              
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 Reported intermittent shooting 10/10 pain associated with chills. ? Had IM medication on right shoulder 1-2 week ago. Patient with multiple other complaints, including SOB, right sided abdominal pain, chest pain. Also complain of more frequent migraine recently, although has had these headaches for months. Thinks she felt something "drain" from her right buttock recently.    Patient reports hives and "redness" with penicillin. She was recently admitted last month, with seizure vs pseudoseizure and respiratory failure requiring intubation      PAST MEDICAL & SURGICAL HISTORY:  Asthma      Seizure      Factor V Leiden      Bipolar disorder      Endometriosis      History of DVT in adulthood  RLE on eliquis      Hypothyroid      Seasonal allergies      Insomnia      GERD (gastroesophageal reflux disease)      No significant past surgical history          Social Hx:     FAMILY HISTORY:  Family history of DVT (Mother)        Allergies    latex (Blisters)  penicillin (Hives)  penicillin (Other)  pineapple (Unknown)  Toradol (Rash)  Toradol (Unknown)  Zofran (Hives)  Zofran (Unknown)    Intolerances        Antibiotics:  MEDICATIONS  (STANDING):  apixaban 5 milliGRAM(s) Oral every 12 hours  ARIPiprazole 30 milliGRAM(s) Oral daily  diVALproex  milliGRAM(s) Oral <User Schedule>  diVALproex ER 1000 milliGRAM(s) Oral <User Schedule>  influenza   Vaccine 0.5 milliLiter(s) IntraMuscular once  levothyroxine 75 MICROGram(s) Oral daily  LORazepam     Tablet 0.5 milliGRAM(s) Oral two times a day  lurasidone 40 milliGRAM(s) Oral daily  mometasone 220 MICROgram(s) Inhaler 1 Puff(s) Inhalation daily  pantoprazole    Tablet 40 milliGRAM(s) Oral before breakfast  QUEtiapine 50 milliGRAM(s) Oral <User Schedule>  QUEtiapine 200 milliGRAM(s) Oral <User Schedule>  vancomycin  IVPB 1250 milliGRAM(s) IV Intermittent every 12 hours    MEDICATIONS  (PRN):  acetaminophen     Tablet .. 650 milliGRAM(s) Oral every 6 hours PRN Temp greater or equal to 38C (100.4F), Mild Pain (1 - 3)  ALBUTerol    90 MICROgram(s) HFA Inhaler 2 Puff(s) Inhalation every 6 hours PRN Shortness of Breath and/or Wheezing  diphenhydrAMINE 25 milliGRAM(s) Oral every 6 hours PRN Rash and/or Itching  melatonin 3 milliGRAM(s) Oral at bedtime PRN Insomnia  morphine  - Injectable 2 milliGRAM(s) IV Push every 6 hours PRN Severe Pain (7 - 10)  oxycodone    5 mG/acetaminophen 325 mG 2 Tablet(s) Oral every 6 hours PRN Moderate Pain (4 - 6)       REVIEW OF SYSTEMS:  CONSTITUTIONAL:  No Fevers  HEENT:  No sore throat or runny nose.  CARDIOVASCULAR:  (+) chest pain  RESPIRATORY:  see history  GASTROINTESTINAL:  No nausea, vomiting or diarrhea.  GENITOURINARY:  No dysuria  SKIN:  see history  NEUROLOGIC:  (+) headaches      Physical Exam:  Vital Signs Last 24 Hrs  T(C): 36.6 (12 Jun 2022 11:30), Max: 36.9 (12 Jun 2022 05:12)  T(F): 97.8 (12 Jun 2022 11:30), Max: 98.4 (12 Jun 2022 05:12)  HR: 96 (12 Jun 2022 11:30) (82 - 108)  BP: 110/80 (12 Jun 2022 11:30) (99/70 - 110/80)  BP(mean): --  RR: 18 (12 Jun 2022 11:30) (18 - 19)  SpO2: 97% (12 Jun 2022 11:30) (95% - 100%)    GEN: NAD  HEENT: normocephalic and atraumatic.  NECK: Supple.    LUNGS: Clear to auscultation.  HEART: Regular rate and rhythm   ABDOMEN: Soft, nontender, and nondistended.   EXTREMITIES: No leg edema.  NEUROLOGIC: grossly intact.  PSYCHIATRIC: flat affect .  SKIN: (+) tender lump on right deltoid, no active drainage noted  no wounds or drainage noted on right gluteal area    Labs:  06-12    139  |  106  |  11  ----------------------------<  80  4.7   |  27  |  0.80    Ca    9.1      12 Jun 2022 08:01  Phos  3.4     06-11  Mg     2.1     06-11    TPro  7.3  /  Alb  3.3  /  TBili  0.3  /  DBili  x   /  AST  35  /  ALT  45  /  AlkPhos  48  06-11                          11.0   3.70  )-----------( 198      ( 12 Jun 2022 08:01 )             34.5         LIVER FUNCTIONS - ( 11 Jun 2022 10:32 )  Alb: 3.3 g/dL / Pro: 7.3 gm/dL / ALK PHOS: 48 U/L / ALT: 45 U/L / AST: 35 U/L / GGT: x                       SARS-CoV-2 Result: NotDetec (06-10-22 @ 08:29)  SARS-CoV-2: NotDetec (06-03-22 @ 14:31)  SARS-CoV-2 Result: NotDetec (05-29-22 @ 11:05)  SARS-CoV-2: NotDetec (05-24-22 @ 16:40)      RECENT CULTURES:  06-10 @ 10:39 .Abscess Arm - Right     No growth        06-10 @ 10:35 .Blood Blood-Peripheral     No growth to date.        06-04 @ 01:05 Clean Catch Clean Catch (Midstream)     >=3 organisms. Probable collection contamination.        06-03 @ 14:31          NotDetec        All imaging and other data have been reviewed.    CT R shoulder:  FINDINGS:    OSSEOUS STRUCTURES    Fractures:  None.    ROTATOR CUFF TENDONS    Rotator Cuff Tendons:  Limited, no definite tearing.    Muscle Atrophy:  None.    CORACOACROMIAL ARCH & AC JOINT    Coracohumeral Space:  Widely patent.    Acromiohumeral Space:  Widely patent.    Acromioclavicular Joint:  Preserved.    GLENOHUMERAL JOINT    Joint Space:  Preserved.    Intra-articular Bodies:  None.    Effusion:  None.    SOFT TISSUES  Fluid collection with lobulated components extending towards the   cutaneous surface is present within the lateral deltoid at the level of   the proximal humeral diaphysis measuring approximately 6.5 cm   craniocaudally by 4.2 x 4.4 cm axially. Thin peripheral rim of   enhancement is present.    IMAGED LUNGS  Clear.    IMPRESSION:  1. Fluid collection of the lateral deltoid with lobulated components   extending towards the cutaneous surface and measures approximately 6.5 x   4.2 x 4.4 cm.  2. Hematoma and abscess may be considered in the appropriate setting.   Consider aspiration as warranted.    
Interventional Radiology    Evaluate for Procedure:     HPI: 36y Female with left deltoid fluid collection s/p aspiration in ER with recurrent fluid collection.     Allergies: latex (Blisters)  penicillin (Hives)  penicillin (Other)  pineapple (Unknown)  Toradol (Rash)  Toradol (Unknown)  Zofran (Hives)  Zofran (Unknown)    Medications (Abx/Cardiac/Anticoagulation/Blood Products)    apixaban: 5 milliGRAM(s) Oral (06-12 @ 17:49)  vancomycin  IVPB: 166.67 mL/Hr IV Intermittent (06-12 @ 17:46)    Data:    T(C): 36.7  HR: 102  BP: 111/77  RR: 18  SpO2: 95%    -WBC 3.70 / HgB 11.0 / Hct 34.5 / Plt 198  -Na 139 / Cl 106 / BUN 11 / Glucose 80  -K 4.7 / CO2 27 / Cr 0.80  -ALT -- / Alk Phos -- / T.Bili --  -INR 1.12 / PTT 30.2    Radiology:     Assessment/Plan:   -36y Female with left deltoid fluid collection s/p aspiration      - case reviewed and approved for left deltoid fluid aspiration pending repeat ultrasound of left shoulder/ue/  - STAT labs in AM (cbc,coags, bmp, T&S)  - hold AC in am  - NPO on at 11pm  - COVID PCR results within 5 days of planned procedure  - d/w primary team

## 2022-06-14 NOTE — PROGRESS NOTE ADULT - SUBJECTIVE AND OBJECTIVE BOX
YODIT MORROW  MRN-[id number]    Follow Up:  right deltoid abscess vs hematoma    Interval History: The pt was seen and examined earlier, no distress, states that she is feeling better today, still complains of right arm being tender, no longer complains of lower extremities pain, states that the pain has improved. The pt is afebrile, on RA, no WBC, Cr ok.     PAST MEDICAL & SURGICAL HISTORY:  Asthma      Seizure      Factor V Leiden      Bipolar disorder      Endometriosis      History of DVT in adulthood  RLE on eliquis      Hypothyroid      Seasonal allergies      Insomnia      GERD (gastroesophageal reflux disease)      No significant past surgical history          ROS:    [ ] Unobtainable because:  [x ] All other systems negative    Constitutional: no fever, no chills  Head: no trauma  Eyes: no vision changes, no eye pain  ENT:  no sore throat, no rhinorrhea  Cardiovascular:  no chest pain, no palpitation  Respiratory:  no SOB, no cough  GI:  no abd pain, no vomiting, no diarrhea  urinary: no dysuria, no hematuria, no flank pain  musculoskeletal:  right upper arm swelling and pain are improving  skin:  no rash  neurology:  no headache, no seizure, no change in mental status  psych: no anxiety, no depression         Allergies  latex (Blisters)  penicillin (Hives)  penicillin (Other)  pineapple (Unknown)  Toradol (Rash)  Toradol (Unknown)  Zofran (Hives)  Zofran (Unknown)        ANTIMICROBIALS:  vancomycin  IVPB 1250 every 12 hours      OTHER MEDS:  acetaminophen     Tablet .. 650 milliGRAM(s) Oral every 6 hours PRN  ALBUTerol    90 MICROgram(s) HFA Inhaler 2 Puff(s) Inhalation every 6 hours PRN  apixaban 5 milliGRAM(s) Oral every 12 hours  ARIPiprazole 30 milliGRAM(s) Oral daily  diphenhydrAMINE 25 milliGRAM(s) Oral every 6 hours PRN  diVALproex  milliGRAM(s) Oral <User Schedule>  diVALproex ER 1000 milliGRAM(s) Oral <User Schedule>  influenza   Vaccine 0.5 milliLiter(s) IntraMuscular once  levothyroxine 75 MICROGram(s) Oral daily  LORazepam     Tablet 0.5 milliGRAM(s) Oral two times a day  lurasidone 40 milliGRAM(s) Oral daily  melatonin 3 milliGRAM(s) Oral at bedtime PRN  mometasone 220 MICROgram(s) Inhaler 1 Puff(s) Inhalation daily  oxycodone    5 mG/acetaminophen 325 mG 2 Tablet(s) Oral every 6 hours PRN  pantoprazole    Tablet 40 milliGRAM(s) Oral before breakfast  QUEtiapine 50 milliGRAM(s) Oral <User Schedule>  QUEtiapine 200 milliGRAM(s) Oral <User Schedule>      Vital Signs Last 24 Hrs  T(C): 36.8 (2022 11:54), Max: 37.1 (2022 23:55)  T(F): 98.2 (2022 11:54), Max: 98.7 (2022 23:55)  HR: 91 (2022 11:54) (87 - 109)  BP: 126/78 (2022 11:54) (95/61 - 126/78)  BP(mean): --  RR: 18 (2022 11:54) (18 - 18)  SpO2: 97% (2022 11:54) (93% - 97%)    Physical Exam:  General:    NAD,  non toxic, on RA  Head: atraumatic, normocephalic  Eye: normal sclera and conjunctiva  ENT:    no oral lesions, neck supple  Cardio:     regular S1, S2,  no murmur  Respiratory:    clear b/l,    no wheezing, no rhonchi, no rales  abd:     soft,   BS +,   no tenderness, not distended, no guarding, no rebound   :   no CVAT,  no suprapubic tenderness,   no  markham  Musculoskeletal:   no joint swelling,   trace edema of b/l LEs  vascular: no central lines, +PIV   Skin:    no rash, right deltoid pain is improving, less of swelling  Neurologic:     no focal deficit, answers questions and follows commands  psych: appropriate, in a good mood    WBC Count: 3.90 K/uL ( @ 07:02)  WBC Count: 4.28 K/uL ( @ 05:34)  WBC Count: 3.70 K/uL ( @ 08:01)  WBC Count: 3.76 K/uL ( @ 10:32)  WBC Count: 5.38 K/uL (06-10 @ 00:52)                            10.6   3.90  )-----------( 169      ( 2022 07:02 )             33.3           139  |  107  |  9   ----------------------------<  83  4.7   |  26  |  0.72    Ca    9.0      2022 07:02  Phos  3.8       Mg     2.0             Urinalysis Basic - ( 2022 20:28 )    Color: Yellow / Appearance: Clear / S.015 / pH: x  Gluc: x / Ketone: Trace  / Bili: Negative / Urobili: 1 mg/dL   Blood: x / Protein: 15 mg/dL / Nitrite: Negative   Leuk Esterase: Negative / RBC: 11-25 /HPF / WBC 0-2   Sq Epi: x / Non Sq Epi: Occasional / Bacteria: Moderate        Creatinine Trend: 0.72<--, 0.70<--, 0.80<--, 0.78<--, 0.94<--, 0.76<--      MICROBIOLOGY:  v  .Body Fluid [facility] arm fluid collection  22   Testing in progress  --    No polymorphonuclear cells seen per low power field  No organisms seen per oil power field      .Abscess Arm - Right  06-10-22   No growth  --  --      .Blood Blood-Peripheral  06-10-22   No growth to date.  --  --      Clean Catch Clean Catch (Midstream)  22   >=3 organisms. Probable collection contamination.  --  --      .Blood Blood  22   No Growth Final  --  --      Clean Catch Clean Catch (Midstream)  22   >=3 organisms. Probable collection contamination.  --  --          Rapid RVP Result: NotDetec ( @ 07:28)          SARS-CoV-2: NotDetec (22 @ 07:28)  Rapid RVP Result: NotDetec (22 @ 07:28)  SARS-CoV-2 Result: NotDetec (06-10-22 @ 08:29)    SARS-CoV-2: NotDetec (2022 07:28)  SARS-CoV-2: NotDetec (2022 14:31)  SARS-CoV-2: NotDetec (24 May 2022 16:40)  SARS-CoV-2: NotDetec (2022 18:56)  SARS-CoV-2: NotDetec (2022 00:53)  COVID-19 PCR: NotDetec (2022 11:25)  COVID-19 PCR: NotDetec (2022 19:37)  COVID-19 PCR: NotDetec (29 Dec 2021 18:23)    RADIOLOGY:

## 2022-06-14 NOTE — PROGRESS NOTE ADULT - ASSESSMENT
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 Reported intermittent shooting 10/10 pain associated with chills. ? Had IM medication on right shoulder 1-2 week ago.     Found to have right deltoid fluid collection, with CT showing 6.5 x 4.2 x 4.4 cm collection. Unclear if infectious vs hematoma, plan for IR aspiration. S/p I&D in the ED, with 40ml purulent drainage obtained per procedure note. However fluid cultures currently no growth. Blood cultures also currently no growth. She has no fevers but has some leukopenia, WBC stable at 3.7.     6/13: s/p RRT yesterday evening - pseudoseizure, no fevers, tachycardic, on RA, no WBC, BCS with no growth to date, abscess cx - no growth thus far, Cr ok, s/p IR drainage today, cultures were sent, Vancomycin IV continued. Left LE is tender on exam - US venous doppler study ordered; complained of mild dysuria, UA ordered.   6/14: right arm with less swelling and less tender on today's exam, yesterday's fluid culture is testing, smear - no organisms seen, the pt is s/p US venous doppler study of b/l LEs - pending reading, no longer has pain in her legs, no fevers, no WBC, UA negative, BCs with no growth to date, Vanco continued for now, Vanco trough today at 16:30, discussed with RN.     -continue vancomycin for now  - awaiting for cultures s/p IR procedure performed on 6/13, smear - no organisms seen  - US venous doppler of b/l LEs to r/o DVT - performed and pending reading   - UA bland  - follow cultures to completion  - CXR - no acute findings      Msg sent to Dr. Baker

## 2022-06-14 NOTE — CONSULT NOTE ADULT - ASSESSMENT
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 Neurology consulted for abnormal movements. Per RRT team seemed conssitent with pseudoseizures. Patient confirms it was similar to her pseudoseizures. NOw C/O of vertigo and right ear fullness. Pe non-focal other than HINTs and [place]-Hallpike suggesting a right ear peripherl vertigo    Seizure  -Most likely pseudoseizure  -REEG pending, does noit need to delay DC as low suspicion   -consider VPA level    Peripheral vertigo  -likely from right ear, can evaluate right ear  -Meclizine 25 mg PRN

## 2022-06-14 NOTE — PROGRESS NOTE ADULT - ASSESSMENT
36 years old female with history of bipolar disorder, hypothyroidism, factor V Leiden with history of VTE on apixaban, seizure disorder presented w/ right shoulder pain for 1 week after being given IM medication on right shoulder 1-2 week ago & was admitted with right shoulder fluid collection.    Collection in right shoulder  - CT shoulder with fluid collection 6.5x 4.2x4.4cm at lateral deltoid region    - status post I&D in ED with 40ml of purulent drainage  - IR drained 15 cc of mildly cloudy serosanguinous drainage on 6/13  - c/w IV vancomycin  - NGTD on aspirate culture - suspect hematoma  - as per surgery, will get IR left deltoid fluid aspiration pending repeat ultrasound   - c/w oral percocet    Seizure disorder  - c/w Depakote    - patient had a seizure episode today - but looked more like pseudoseizure (she has a history of pseudoseizures and aggressive behavior)    - EEG pending   - neurology note read and appreciated - will check Depakote level  - psych note read and appreciated     Peripheral vertigo  - likely from right ear as per neuro, can follow up w/ ENT as outpt  - c/w Meclizine 25 mg PRN     Bipolar disorder  - c/w Aripiprazole, Latuda, Depakote, quetiapine & ativan  - psych note read and appreciated     Hypothyroidism  - c/w Synthroid      Factor V Leiden w/ DVT  - c/w Eliquis     Asthma  - c/w Asthmanex and albuterol PRN    Prophylaxis:  DVT: Eliquis   GI: Protonix    Called and updated Carol, the  from [facility] ([phone]) yesterday.    Will discharge back to group home when cleared by ID.

## 2022-06-15 LAB
ANION GAP SERPL CALC-SCNC: 6 MMOL/L — SIGNIFICANT CHANGE UP (ref 5–17)
BUN SERPL-MCNC: 11 MG/DL — SIGNIFICANT CHANGE UP (ref 7–23)
CALCIUM SERPL-MCNC: 8.9 MG/DL — SIGNIFICANT CHANGE UP (ref 8.5–10.1)
CHLORIDE SERPL-SCNC: 105 MMOL/L — SIGNIFICANT CHANGE UP (ref 96–108)
CO2 SERPL-SCNC: 28 MMOL/L — SIGNIFICANT CHANGE UP (ref 22–31)
CREAT SERPL-MCNC: 0.7 MG/DL — SIGNIFICANT CHANGE UP (ref 0.5–1.3)
CULTURE RESULTS: SIGNIFICANT CHANGE UP
EGFR: 116 ML/MIN/1.73M2 — SIGNIFICANT CHANGE UP
GLUCOSE BLDC GLUCOMTR-MCNC: 108 MG/DL — HIGH (ref 70–99)
GLUCOSE SERPL-MCNC: 92 MG/DL — SIGNIFICANT CHANGE UP (ref 70–99)
MAGNESIUM SERPL-MCNC: 2 MG/DL — SIGNIFICANT CHANGE UP (ref 1.6–2.6)
PHOSPHATE SERPL-MCNC: 3.4 MG/DL — SIGNIFICANT CHANGE UP (ref 2.5–4.5)
POTASSIUM SERPL-MCNC: 3.8 MMOL/L — SIGNIFICANT CHANGE UP (ref 3.5–5.3)
POTASSIUM SERPL-SCNC: 3.8 MMOL/L — SIGNIFICANT CHANGE UP (ref 3.5–5.3)
SODIUM SERPL-SCNC: 139 MMOL/L — SIGNIFICANT CHANGE UP (ref 135–145)
SPECIMEN SOURCE: SIGNIFICANT CHANGE UP

## 2022-06-15 PROCEDURE — 95816 EEG AWAKE AND DROWSY: CPT | Mod: 26

## 2022-06-15 PROCEDURE — 99291 CRITICAL CARE FIRST HOUR: CPT

## 2022-06-15 PROCEDURE — 99231 SBSQ HOSP IP/OBS SF/LOW 25: CPT

## 2022-06-15 PROCEDURE — 99233 SBSQ HOSP IP/OBS HIGH 50: CPT

## 2022-06-15 RX ORDER — DIPHENHYDRAMINE HCL 50 MG
50 CAPSULE ORAL THREE TIMES A DAY
Refills: 0 | Status: DISCONTINUED | OUTPATIENT
Start: 2022-06-15 | End: 2022-06-15

## 2022-06-15 RX ORDER — DIPHENHYDRAMINE HCL 50 MG
25 CAPSULE ORAL ONCE
Refills: 0 | Status: COMPLETED | OUTPATIENT
Start: 2022-06-15 | End: 2022-06-15

## 2022-06-15 RX ORDER — DIPHENHYDRAMINE HCL 50 MG
50 CAPSULE ORAL ONCE
Refills: 0 | Status: COMPLETED | OUTPATIENT
Start: 2022-06-15 | End: 2022-06-15

## 2022-06-15 RX ORDER — CHLORPROMAZINE HCL 10 MG
50 TABLET ORAL EVERY 6 HOURS
Refills: 0 | Status: DISCONTINUED | OUTPATIENT
Start: 2022-06-15 | End: 2022-06-15

## 2022-06-15 RX ADMIN — PANTOPRAZOLE SODIUM 40 MILLIGRAM(S): 20 TABLET, DELAYED RELEASE ORAL at 07:49

## 2022-06-15 RX ADMIN — DIVALPROEX SODIUM 500 MILLIGRAM(S): 500 TABLET, DELAYED RELEASE ORAL at 13:29

## 2022-06-15 RX ADMIN — Medication 25 MILLIGRAM(S): at 08:12

## 2022-06-15 RX ADMIN — OXYCODONE AND ACETAMINOPHEN 2 TABLET(S): 5; 325 TABLET ORAL at 07:54

## 2022-06-15 RX ADMIN — OXYCODONE AND ACETAMINOPHEN 2 TABLET(S): 5; 325 TABLET ORAL at 01:37

## 2022-06-15 RX ADMIN — OXYCODONE AND ACETAMINOPHEN 2 TABLET(S): 5; 325 TABLET ORAL at 08:54

## 2022-06-15 RX ADMIN — Medication 2 MILLIGRAM(S): at 21:12

## 2022-06-15 RX ADMIN — OXYCODONE AND ACETAMINOPHEN 2 TABLET(S): 5; 325 TABLET ORAL at 15:09

## 2022-06-15 RX ADMIN — MOMETASONE FUROATE 1 PUFF(S): 220 INHALANT RESPIRATORY (INHALATION) at 13:34

## 2022-06-15 RX ADMIN — Medication 50 MILLIGRAM(S): at 06:55

## 2022-06-15 RX ADMIN — OXYCODONE AND ACETAMINOPHEN 2 TABLET(S): 5; 325 TABLET ORAL at 22:13

## 2022-06-15 RX ADMIN — Medication 2 MILLIGRAM(S): at 02:30

## 2022-06-15 RX ADMIN — Medication 25 MILLIGRAM(S): at 00:00

## 2022-06-15 RX ADMIN — Medication 75 MICROGRAM(S): at 05:32

## 2022-06-15 RX ADMIN — OXYCODONE AND ACETAMINOPHEN 2 TABLET(S): 5; 325 TABLET ORAL at 14:09

## 2022-06-15 RX ADMIN — APIXABAN 5 MILLIGRAM(S): 2.5 TABLET, FILM COATED ORAL at 05:32

## 2022-06-15 RX ADMIN — Medication 2 MILLIGRAM(S): at 15:40

## 2022-06-15 RX ADMIN — DIVALPROEX SODIUM 1000 MILLIGRAM(S): 500 TABLET, DELAYED RELEASE ORAL at 22:18

## 2022-06-15 RX ADMIN — QUETIAPINE FUMARATE 50 MILLIGRAM(S): 200 TABLET, FILM COATED ORAL at 13:32

## 2022-06-15 RX ADMIN — ARIPIPRAZOLE 30 MILLIGRAM(S): 15 TABLET ORAL at 13:29

## 2022-06-15 RX ADMIN — OXYCODONE AND ACETAMINOPHEN 2 TABLET(S): 5; 325 TABLET ORAL at 23:02

## 2022-06-15 RX ADMIN — Medication 25 MILLIGRAM(S): at 02:30

## 2022-06-15 RX ADMIN — OXYCODONE AND ACETAMINOPHEN 2 TABLET(S): 5; 325 TABLET ORAL at 02:37

## 2022-06-15 RX ADMIN — LURASIDONE HYDROCHLORIDE 40 MILLIGRAM(S): 40 TABLET ORAL at 13:30

## 2022-06-15 RX ADMIN — QUETIAPINE FUMARATE 200 MILLIGRAM(S): 200 TABLET, FILM COATED ORAL at 07:48

## 2022-06-15 RX ADMIN — Medication 50 MILLIGRAM(S): at 14:09

## 2022-06-15 RX ADMIN — Medication 166.67 MILLIGRAM(S): at 06:54

## 2022-06-15 RX ADMIN — QUETIAPINE FUMARATE 200 MILLIGRAM(S): 200 TABLET, FILM COATED ORAL at 19:55

## 2022-06-15 RX ADMIN — Medication 2 MILLIGRAM(S): at 06:55

## 2022-06-15 NOTE — BH CONSULTATION LIAISON PROGRESS NOTE - NSBHFUPINTERVALHXFT_PSY_A_CORE
No
Patient has remained thus far calm, cooperative and medication compliant though was upset since last night as she did not get her IV benadryl which she likes. Patient is still amicable to going home tomorrow. No aggressive acting out behavior. Spends the day eating snacks and watching cartoons.  Otherwise, does not describe or manifest any symptoms of hypomania/brit/psychosis/major depression/ anxiety/panic. Denies any active or passive suicidal or homicidal ideation. Names protective factors (bentley; has interests, hope for future). Endorses medication compliance. Denies adverse medication side effects.
Patient has remained thus far calm, cooperative and medication compliant. No aggressive acting out behavior. Spends the day eating snacks and watching cartoons.  Otherwise, does not describe or manifest any symptoms of hypomania/brit/psychosis/major depression/ anxiety/panic. Denies any active or passive suicidal or homicidal ideation. Names protective factors (bentley; has interests, hope for future). Endorses medication compliance. Denies adverse medication side effects.

## 2022-06-15 NOTE — BH CONSULTATION LIAISON PROGRESS NOTE - CURRENT MEDICATION
MEDICATIONS  (STANDING):  apixaban 5 milliGRAM(s) Oral every 12 hours  ARIPiprazole 30 milliGRAM(s) Oral daily  diVALproex  milliGRAM(s) Oral <User Schedule>  diVALproex ER 1000 milliGRAM(s) Oral <User Schedule>  influenza   Vaccine 0.5 milliLiter(s) IntraMuscular once  levothyroxine 75 MICROGram(s) Oral daily  LORazepam     Tablet 0.5 milliGRAM(s) Oral two times a day  lurasidone 40 milliGRAM(s) Oral daily  mometasone 220 MICROgram(s) Inhaler 1 Puff(s) Inhalation daily  pantoprazole    Tablet 40 milliGRAM(s) Oral before breakfast  QUEtiapine 50 milliGRAM(s) Oral <User Schedule>  QUEtiapine 200 milliGRAM(s) Oral <User Schedule>  vancomycin  IVPB 1250 milliGRAM(s) IV Intermittent every 12 hours    MEDICATIONS  (PRN):  acetaminophen     Tablet .. 650 milliGRAM(s) Oral every 6 hours PRN Temp greater or equal to 38C (100.4F), Mild Pain (1 - 3)  ALBUTerol    90 MICROgram(s) HFA Inhaler 2 Puff(s) Inhalation every 6 hours PRN Shortness of Breath and/or Wheezing  diphenhydrAMINE 25 milliGRAM(s) Oral every 6 hours PRN Rash and/or Itching  melatonin 3 milliGRAM(s) Oral at bedtime PRN Insomnia  oxycodone    5 mG/acetaminophen 325 mG 2 Tablet(s) Oral every 6 hours PRN Moderate Pain (4 - 6)  
MEDICATIONS  (STANDING):  apixaban 5 milliGRAM(s) Oral every 12 hours  ARIPiprazole 30 milliGRAM(s) Oral daily  diVALproex  milliGRAM(s) Oral <User Schedule>  diVALproex ER 1000 milliGRAM(s) Oral <User Schedule>  influenza   Vaccine 0.5 milliLiter(s) IntraMuscular once  levothyroxine 75 MICROGram(s) Oral daily  LORazepam     Tablet 0.5 milliGRAM(s) Oral two times a day  lurasidone 40 milliGRAM(s) Oral daily  mometasone 220 MICROgram(s) Inhaler 1 Puff(s) Inhalation daily  pantoprazole    Tablet 40 milliGRAM(s) Oral before breakfast  QUEtiapine 50 milliGRAM(s) Oral <User Schedule>  QUEtiapine 200 milliGRAM(s) Oral <User Schedule>  vancomycin  IVPB 1250 milliGRAM(s) IV Intermittent every 12 hours    MEDICATIONS  (PRN):  acetaminophen     Tablet .. 650 milliGRAM(s) Oral every 6 hours PRN Temp greater or equal to 38C (100.4F), Mild Pain (1 - 3)  ALBUTerol    90 MICROgram(s) HFA Inhaler 2 Puff(s) Inhalation every 6 hours PRN Shortness of Breath and/or Wheezing  diphenhydrAMINE 25 milliGRAM(s) Oral every 6 hours PRN Rash and/or Itching  LORazepam   Injectable 2 milliGRAM(s) IntraMuscular every 6 hours PRN Agitation  meclizine 25 milliGRAM(s) Oral every 6 hours PRN vertigo  melatonin 3 milliGRAM(s) Oral at bedtime PRN Insomnia  oxycodone    5 mG/acetaminophen 325 mG 2 Tablet(s) Oral every 6 hours PRN Moderate Pain (4 - 6)

## 2022-06-15 NOTE — BH CONSULTATION LIAISON PROGRESS NOTE - NSBHCONSULTFOLLOW_PSY_ALL_CORE
No, psychiatric follow up needed - call with questions...
No, psychiatric follow up needed - call with questions...

## 2022-06-15 NOTE — BH CONSULTATION LIAISON PROGRESS NOTE - NSBHCONSULTRECOMMENDOTHER_PSY_A_CORE FT
continue Seroquel 200mg @ 8am, 50mg @ 12pm, 200mg @ 8pm; Abilify 30mg daily, lurasidone 40mg po daily  depakote 500mg po in am and 1000 PO qhs, Lorazepam PO 0.5mg bid, thorazine 50mg q6h PRN,  Tizanidine 2mg bid PRN, Levothyroxine 75mg daily, Pantoprazole 40 mg AM  - thorazine 50mg IM q4hrs PRN for agitation with Ativan 2mg IM q4hrs PRN  - Patient has a legal guardian Maria Elena Brown [phone]; Patient CANNOT refuse transfer back to [facility] home when medically stable  
continue Seroquel 200mg @ 8am, 50mg @ 12pm, 200mg @ 8pm; Abilify 30mg daily, lurasidone 40mg po daily  depakote 500mg po in am and 1000 PO qhs, Lorazepam PO 0.5mg bid, thorazine 50mg q6h PRN,  Tizanidine 2mg bid PRN, Levothyroxine 75mg daily, Pantoprazole 40 mg AM  - thorazine 50mg IM q4hrs PRN for agitation with Ativan 2mg IM q4hrs PRN; Benadryl 50mg IVP TID PRN   - Patient has a legal guardian Maria Elena Kevin [phone]; Patient CANNOT refuse transfer back to [facility] home when medically stable

## 2022-06-15 NOTE — BH CONSULTATION LIAISON PROGRESS NOTE - GENERAL APPEARANCE
Pharyngitis   AMBULATORY CARE:   Pharyngitis , or sore throat, is inflammation of the tissues and structures in your pharynx (throat)  Pharyngitis is most often caused by bacteria  It may also be caused by a cold or flu virus  Other causes include smoking, allergies, or acid reflux  Signs and symptoms that may occur with pharyngitis:   · Sore throat or pain when you swallow    · Fever, chills, and body aches    · Hoarse or raspy voice    · Cough, runny or stuffy nose, itchy or watery eyes    · Headache    · Upset stomach and loss of appetite    · Mild neck stiffness    · Swollen glands that feel like hard lumps when you touch your neck    · White and yellow pus-filled blisters in the back of your throat  Call 911 for any of the following:   · You have trouble breathing or swallowing because your throat is swollen or sore  Seek care immediately if:   · You are drooling because it hurts too much to swallow  · Your fever is higher than 102? F (39?C) or lasts longer than 3 days  · You are confused  · You taste blood in your throat  Contact your healthcare provider if:   · Your throat pain gets worse  · You have a painful lump in your throat that does not go away after 5 days  · Your symptoms do not improve after 5 days  · You have questions or concerns about your condition or care  Treatment for pharyngitis:  Viral pharyngitis will go away on its own without treatment  Your sore throat should start to feel better in 3 to 5 days for both viral and bacterial infections  You may need any of the following:  · Antibiotics  treat a bacterial infection  · NSAIDs , such as ibuprofen, help decrease swelling, pain, and fever  NSAIDs can cause stomach bleeding or kidney problems in certain people  If you take blood thinner medicine, always ask your healthcare provider if NSAIDs are safe for you  Always read the medicine label and follow directions  · Acetaminophen  decreases pain and fever   It is
available without a doctor's order  Ask how much to take and how often to take it  Follow directions  Acetaminophen can cause liver damage if not taken correctly  Manage your symptoms:   · Gargle salt water  Mix ¼ teaspoon salt in an 8 ounce glass of warm water and gargle  This may help decrease swelling in your throat  · Drink liquids as directed  You may need to drink more liquids than usual  Liquids may help soothe your throat and prevent dehydration  Ask how much liquid to drink each day and which liquids are best for you  · Use a cool-steam humidifier  to help moisten the air in your room and calm your cough  · Soothe your throat  with cough drops, ice, soft foods, or popsicles  Prevent the spread of pharyngitis:  Cover your mouth and nose when you cough or sneeze  Do not share food or drinks  Wash your hands often  Use soap and water  If soap and water are unavailable, use an alcohol based hand   Follow up with your healthcare provider as directed:  Write down your questions so you remember to ask them during your visits  © 2017 [facility] Information is for End User's use only and may not be sold, redistributed or otherwise used for commercial purposes  All illustrations and images included in CareNotes® are the copyrighted property of A D A M , Inc  or Flo Clayton  The above information is an  only  It is not intended as medical advice for individual conditions or treatments  Talk to your doctor, nurse or pharmacist before following any medical regimen to see if it is safe and effective for you  Wellness Visit for Adults   WHAT YOU NEED TO KNOW:   What is a wellness visit? A wellness visit is when you see your healthcare provider to get screened for health problems  You can also get advice on how to stay healthy  Write down your questions so you remember to ask them   Ask your healthcare provider how often you should have a wellness
visit    What happens at a wellness visit? Your healthcare provider will ask about your health, and your family history of health problems  This includes high blood pressure, heart disease, and cancer  He or she will ask if you have symptoms that concern you, if you smoke, and about your mood  You may also be asked about your intake of medicines, supplements, food, and alcohol  Any of the following may be done:  · Your weight  will be checked  Your height may also be checked so your body mass index (BMI) can be calculated  Your BMI shows if you are at a healthy weight  · Your blood pressure  and heart rate will be checked  Your temperature may also be checked  · Blood and urine tests  may be done  Blood tests may be done to check your cholesterol levels  Abnormal cholesterol levels increase your risk for heart disease and stroke  You may also need a blood or urine test to check for diabetes if you are at increased risk  Urine tests may be done to look for signs of an infection or kidney disease  · A physical exam  includes checking your heartbeat and lungs with a stethoscope  Your healthcare provider may also check your skin to look for sun damage  · Screening tests  may be recommended  A screening test is done to check for diseases that may not cause symptoms  The screening tests you may need depend on your age, gender, family history, and lifestyle habits  For example, colorectal screening may be recommended if you are 48years old or older  What screening tests do I need if I am a woman? · A Pap smear  is used to screen for cervical cancer  Pap smears are usually done every 3 to 5 years depending on your age  You may need them more often if you have had abnormal Pap smear test results in the past  Ask your healthcare provider how often you should have a Pap smear  · A mammogram  is an x-ray of your breasts to screen for breast cancer   Experts recommend mammograms every 2 years starting at
age 48 years  You may need a mammogram at age 52 years or younger if you have an increased risk for breast cancer  Talk to your healthcare provider about when you should start having mammograms and how often you need them  What vaccines might I need? · Get an influenza vaccine  every year  The influenza vaccine protects you from the flu  Several types of viruses cause the flu  The viruses change over time, so new vaccines are made each year  · Get a tetanus-diphtheria (Td) booster vaccine  every 10 years  This vaccine protects you against tetanus and diphtheria  Tetanus is a severe infection that may cause painful muscle spasms and lockjaw  Diphtheria is a severe bacterial infection that causes a thick covering in the back of your mouth and throat  · Get a human papillomavirus (HPV) vaccine  if you are female and aged 23 to 32 or male 23 to 24 and never received it  This vaccine protects you from HPV infection  HPV is the most common infection spread by sexual contact  HPV may also cause vaginal, penile, and anal cancers  · Get a pneumococcal vaccine  if you are aged 72 years or older  The pneumococcal vaccine is an injection given to protect you from pneumococcal disease  Pneumococcal disease is an infection caused by pneumococcal bacteria  The infection may cause pneumonia, meningitis, or an ear infection  · Get a shingles vaccine  if you are aged 61 or older, even if you have had shingles before  The shingles vaccine is an injection to protect you from the varicella-zoster virus  This is the same virus that causes chickenpox  Shingles is a painful rash that develops in people who had chickenpox or have been exposed to the virus  How can I eat healthy? My Plate is a model for planning healthy meals  It shows the types and amounts of foods that should go on your plate  Fruits and vegetables make up about half of your plate, and grains and protein make up the other half   A serving of dairy is
included on the side of your plate  The amount of calories and serving sizes you need depends on your age, gender, weight, and height  Examples of healthy foods are listed below:  · Eat a variety of vegetables  such as dark green, red, and orange vegetables  You can also include canned vegetables low in sodium (salt) and frozen vegetables without added butter or sauces  · Eat a variety of fresh fruits , canned fruit in 100% juice, frozen fruit, and dried fruit  · Include whole grains  At least half of the grains you eat should be whole grains  Examples include whole-wheat bread, wheat pasta, brown rice, and whole-grain cereals such as oatmeal     · Eat a variety of protein foods such as seafood (fish and shellfish), lean meat, and poultry without skin (turkey and chicken)  Examples of lean meats include pork leg, shoulder, or tenderloin, and beef round, sirloin, tenderloin, and extra lean ground beef  Other protein foods include eggs and egg substitutes, beans, peas, soy products, nuts, and seeds  · Choose low-fat dairy products such as skim or 1% milk or low-fat yogurt, cheese, and cottage cheese  · Limit unhealthy fats  such as butter, hard margarine, and shortening  How much exercise do I need? Exercise at least 30 minutes per day on most days of the week  Some examples of exercise include walking, biking, dancing, and swimming  You can also fit in more physical activity by taking the stairs instead of the elevator or parking farther away from stores  Include muscle strengthening activities 2 days each week  Regular exercise provides many health benefits  It helps you manage your weight, and decreases your risk for type 2 diabetes, heart disease, stroke, and high blood pressure  Exercise can also help improve your mood  Ask your healthcare provider about the best exercise plan for you  What are some general health and safety guidelines I should follow? · Do not smoke    Nicotine and
other chemicals in cigarettes and cigars can cause lung damage  Ask your healthcare provider for information if you currently smoke and need help to quit  E-cigarettes or smokeless tobacco still contain nicotine  Talk to your healthcare provider before you use these products  · Limit alcohol  A drink of alcohol is 12 ounces of beer, 5 ounces of wine, or 1½ ounces of liquor  · Lose weight, if needed  Being overweight increases your risk of certain health conditions  These include heart disease, high blood pressure, type 2 diabetes, and certain types of cancer  · Protect your skin  Do not sunbathe or use tanning beds  Use sunscreen with a SPF 15 or higher  Apply sunscreen at least 15 minutes before you go outside  Reapply sunscreen every 2 hours  Wear protective clothing, hats, and sunglasses when you are outside  · Drive safely  Always wear your seatbelt  Make sure everyone in your car wears a seatbelt  A seatbelt can save your life if you are in an accident  Do not use your cell phone when you are driving  This could distract you and cause an accident  Pull over if you need to make a call or send a text message  · Practice safe sex  Use latex condoms if are sexually active and have more than one partner  Your healthcare provider may recommend screening tests for sexually transmitted infections (STIs)  · Wear helmets, lifejackets, and protective gear  Always wear a helmet when you ride a bike or motorcycle, go skiing, or play sports that could cause a head injury  Wear protective equipment when you play sports  Wear a lifejacket when you are on a boat or doing water sports  CARE AGREEMENT:   You have the right to help plan your care  Learn about your health condition and how it may be treated  Discuss treatment options with your caregivers to decide what care you want to receive  You always have the right to refuse treatment  The above information is an  only   It is not
intended as medical advice for individual conditions or treatments  Talk to your doctor, nurse or pharmacist before following any medical regimen to see if it is safe and effective for you  © 2017 [address] [employer] Information is for End User's use only and may not be sold, redistributed or otherwise used for commercial purposes  All illustrations and images included in CareNotes® are the copyrighted property of A D A M , Inc  or Flo Clayton 
No deformities present
No deformities present

## 2022-06-15 NOTE — PROGRESS NOTE ADULT - ASSESSMENT
36 years old female with history of bipolar disorder, hypothyroidism, factor V Leiden with history of VTE on apixaban, seizure disorder presented w/ right shoulder pain for 1 week after being given IM medication on right shoulder 1-2 week ago & was admitted with right shoulder fluid collection.    Collection in right shoulder abscess?  - CT shoulder with fluid collection 6.5x 4.2x4.4cm at lateral deltoid region    - status post I&D in ED with 40ml of purulent drainage  - IR drained 15 cc of mildly cloudy serosanguinous drainage on 6/13  - Vancomycin trough 9.1, Discussed with ID>>cont IV vancomycin till all fluid cultures back.  - c/w oral percocet    Seizure disorder  - c/w Depakote    - she has a history of pseudoseizures and aggressive behavior)    - EEG pending   - neurology note read and appreciated  - psych note read and appreciated     Peripheral vertigo  - likely from right ear as per neuro, can follow up w/ ENT as outpt  - c/w Meclizine 25 mg PRN     Bipolar disorder  - c/w Aripiprazole, Latuda, Depakote, quetiapine & ativan  - psych note read and appreciated     Hypothyroidism  - c/w Synthroid      Factor V Leiden w/ DVT  - c/w Eliquis     Asthma  - c/w Asthmanex and albuterol PRN    Prophylaxis:  DVT: Eliquis   GI: Protonix    Carol, the  from [facility] ([phone]) was updated yesterday.    Will discharge back to group home when cleared by ID.

## 2022-06-15 NOTE — PROGRESS NOTE ADULT - SUBJECTIVE AND OBJECTIVE BOX
CHIEF COMPLAINT: Agitated last night and received ativan.   now calm and sleepy  no fever  no vomiting  on 1:1  no sob reported       PHYSICAL EXAM:    GENERAL: Obese, no acute distress   CHEST/LUNG: Clear to ausculation bilaterally, no wheezing, no crackles   HEART: Regular rate and rhythm; No murmurs, rubs  ABDOMEN: Soft, Nontender, Nondistended; Bowel sounds present  EXTREMITIES:  Moving all four extremities spontaneously, No clubbing, cyanosis, or edema. Dressing right shoulder with some local tenderness.   NERVOUS SYSTEM:  Grossly non focal but limited exam due to poor participation. .  Psychiatry: Drowsy but arousable.       OBJECTIVE DATA:   Vital Signs Last 24 Hrs  T(C): 36.6 (15 Júnior 2022 10:58), Max: 36.7 (15 Júnior 2022 00:11)  T(F): 97.8 (15 Júnior 2022 10:58), Max: 98.1 (15 Júnior 2022 05:32)  HR: 104 (15 Júnior 2022 10:58) (93 - 106)  BP: 115/84 (15 Júnior 2022 10:58) (105/71 - 131/90)  BP(mean): --  RR: 17 (15 Júnior 2022 10:58) (17 - 18)  SpO2: 97% (15 Júnior 2022 10:58) (97% - 100%)           Daily                           10.6   3.90  )-----------( 169      ( 2022 07:02 )             33.3             06-15    139  |  105  |  11  ----------------------------<  92  3.8   |  28  |  0.70    Ca    8.9      15 Júnior 2022 09:26  Phos  3.4     06-15  Mg     2.0     06-15                  Urinalysis Basic - ( 2022 20:28 )    Color: Yellow / Appearance: Clear / S.015 / pH: x  Gluc: x / Ketone: Trace  / Bili: Negative / Urobili: 1 mg/dL   Blood: x / Protein: 15 mg/dL / Nitrite: Negative   Leuk Esterase: Negative / RBC: 11-25 /HPF / WBC 0-2   Sq Epi: x / Non Sq Epi: Occasional / Bacteria: Moderate           Culture - Body Fluid with Gram Stain  Source: .Body Fluid righ arm fluid collection  Gram Stain (prelim) ():    No polymorphonuclear cells seen per low power field    No organisms seen per oil power field  Preliminary Report ():    No growth    MEDICATIONS  (STANDING):  apixaban 5 milliGRAM(s) Oral every 12 hours  ARIPiprazole 30 milliGRAM(s) Oral daily  diVALproex  milliGRAM(s) Oral <User Schedule>  diVALproex ER 1000 milliGRAM(s) Oral <User Schedule>  influenza   Vaccine 0.5 milliLiter(s) IntraMuscular once  levothyroxine 75 MICROGram(s) Oral daily  LORazepam     Tablet 0.5 milliGRAM(s) Oral two times a day  lurasidone 40 milliGRAM(s) Oral daily  mometasone 220 MICROgram(s) Inhaler 1 Puff(s) Inhalation daily  pantoprazole    Tablet 40 milliGRAM(s) Oral before breakfast  QUEtiapine 50 milliGRAM(s) Oral <User Schedule>  QUEtiapine 200 milliGRAM(s) Oral <User Schedule>  vancomycin  IVPB 1250 milliGRAM(s) IV Intermittent every 12 hours    MEDICATIONS  (PRN):  acetaminophen     Tablet .. 650 milliGRAM(s) Oral every 6 hours PRN Temp greater or equal to 38C (100.4F), Mild Pain (1 - 3)  ALBUTerol    90 MICROgram(s) HFA Inhaler 2 Puff(s) Inhalation every 6 hours PRN Shortness of Breath and/or Wheezing  diphenhydrAMINE 25 milliGRAM(s) Oral every 6 hours PRN Rash and/or Itching  LORazepam   Injectable 2 milliGRAM(s) IntraMuscular every 6 hours PRN Agitation  meclizine 25 milliGRAM(s) Oral every 6 hours PRN vertigo  melatonin 3 milliGRAM(s) Oral at bedtime PRN Insomnia  oxycodone    5 mG/acetaminophen 325 mG 2 Tablet(s) Oral every 6 hours PRN Moderate Pain (4 - 6)

## 2022-06-15 NOTE — PROGRESS NOTE ADULT - ASSESSMENT
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 Neurology consulted for abnormal movements. Per RRT team seemed conssitent with pseudoseizures. Patient confirms it was similar to her pseudoseizures. Pe today non-focal    Seizure  -Most likely pseudoseizure  -REEG normal  -No further inpatient Neurologic workup at this time  Can follow up with Neurology, Dr. Byers

## 2022-06-15 NOTE — BH CONSULTATION LIAISON PROGRESS NOTE - NSBHCHARTREVIEWVS_PSY_A_CORE FT
Vital Signs Last 24 Hrs  T(C): 36.2 (14 Jun 2022 05:13), Max: 37.1 (13 Jun 2022 23:55)  T(F): 97.1 (14 Jun 2022 05:13), Max: 98.7 (13 Jun 2022 23:55)  HR: 96 (14 Jun 2022 05:13) (87 - 109)  BP: 103/73 (14 Jun 2022 05:13) (95/61 - 121/89)  BP(mean): --  RR: 18 (14 Jun 2022 05:13) (18 - 18)  SpO2: 95% (14 Jun 2022 05:13) (93% - 100%)
Vital Signs Last 24 Hrs  T(C): 36.6 (15 Júnior 2022 10:58), Max: 36.7 (15 Júnior 2022 00:11)  T(F): 97.8 (15 Júnior 2022 10:58), Max: 98.1 (15 Júnior 2022 05:32)  HR: 104 (15 Júnior 2022 10:58) (93 - 106)  BP: 115/84 (15 Júnior 2022 10:58) (105/71 - 131/90)  BP(mean): --  RR: 17 (15 Júnior 2022 10:58) (17 - 18)  SpO2: 97% (15 Júnior 2022 10:58) (97% - 100%)

## 2022-06-15 NOTE — RAPID RESPONSE TEAM SUMMARY - NSADDTLFINDINGSRRT_GEN_ALL_CORE
patient noted to have stable vitals: bp at 124 systolic Heart rate at 117 02 sat at 99 percent  patient slowly opened eyes , starting crying tearful reporting she sees her family in her head  asked patient if she wanted a psychiatric followup patient refused    subsequenty code grey called 30 min later, patient agigated walking up and down halls asking for pain medication and IV benedryl
Patient would not respond to her name being called, responded to sternal rubs, grimaced with pain, Pupils reactive to light, blinks to finger, after some jerking movements patient woke up screaming for pain medication. She then jumped OOB and started to run off unit. When intercepted she became aggressive and attempted to hit staff with a clinched fists.
RRT called for possible seizure episode but more so pseudoseizures as pt has a hx of pseudoseizures and psychiatric behaviors. VS as follow: /59, , SpO2 98% on RA and temp 98.1 (axillary). On exam, pt lying in bed with mild twitching of upper extremities, no twitching of face noted. STAT 0.5mg IV ativan given. Couple of minutes later, patient awake, alert, crying and requesting pain meds.  Dr. Baker was present during RRT

## 2022-06-15 NOTE — BH CONSULTATION LIAISON PROGRESS NOTE - NSBHCHARTREVIEWLAB_PSY_A_CORE FT
06-14    139  |  107  |  9   ----------------------------<  83  4.7   |  26  |  0.72    Ca    9.0      14 Jun 2022 07:02  Phos  3.8     06-14  Mg     2.0     06-14    
06-14    139  |  107  |  9   ----------------------------<  83  4.7   |  26  |  0.72    Ca    9.0      14 Jun 2022 07:02  Phos  3.8     06-14  Mg     2.0     06-14

## 2022-06-15 NOTE — BH CONSULTATION LIAISON PROGRESS NOTE - NSBHCHARTREVIEWINVESTIGATE_PSY_A_CORE FT
CT Shoulder w/ IV Cont, Right (06.10.22 @ 01:32) Fluid collection of the lateral deltoid with lobulated components   extending towards the cutaneous surface and measures approximately 6.5 x 4.2 x 4.4 cm.        
CT Shoulder w/ IV Cont, Right (06.10.22 @ 01:32) Fluid collection of the lateral deltoid with lobulated components   extending towards the cutaneous surface and measures approximately 6.5 x 4.2 x 4.4 cm.

## 2022-06-15 NOTE — RAPID RESPONSE TEAM SUMMARY - NSSITUATIONBACKGROUNDRRT_GEN_ALL_CORE
Have You Had Laser Hair Removal Before?: has had previous treatment
When Was Your Last Laser Treatment?: 08/19/21
Number Of Treatments: 2
RRT  36 years old female with history of bipolar disorder, hypothyroidism, factor V Leiden with history of VTE on apixaban, seizure disorder presented admitted to med /surg with right shoulder fluid collection.   patient has been on ativan prn for acute agitation  RRT called due to unresponsiveness of patient   patient was given ativan prior  Neuro and psych on board  eeg performed today negative  on exam: patient arousable, movingextremities on sternal rub
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 Reported intermittent shooting 10/10 pain associated with chills. ? Had IM medication on right shoulder 1-2 week ago. Patient also complaining of more frequent migraine recently.   Hemodynamically stable, afebrile, sat well at RA. EKG with sinus tachy, , QTc 455. No leukocytosis. Plt 189, K 4.6, Cr 0.94, lactate 1.1. CT shoulder with fluid collection 6.5x 4.2x4.4cm at lateral deltoid region. CT head with no acute pathology. Mildly enlarged heterogenous pituitary gland with internal 6mm nodular hyperattetnuating, unchanged since 3/3/2022. 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 S/P I&D in ED with 40ml of purulent drainage. 
36 years old female with history of bipolar disorder, hypothyroidism, factor V Leiden with history of VTE on apixaban, seizure disorder presented w/ right shoulder pain for 1 week after being given IM medication on right shoulder 1-2 week ago & was admitted with right shoulder fluid collection.    Called by RN that patient was not answering to her name being called.

## 2022-06-15 NOTE — BH CONSULTATION LIAISON PROGRESS NOTE - OTHER
tearful with dramatic flair  "it hurts!" + reactive  adequate during exam  chronically low end of fair to limited  concrete

## 2022-06-15 NOTE — RAPID RESPONSE TEAM SUMMARY - NSOTHERINTERVENTIONSRRT_GEN_ALL_CORE
Pt remains on unit
no other interventions performed
Security and PCA at bedside. Frequent safety checks.

## 2022-06-15 NOTE — EEG REPORT - NS EEG TEXT BOX
[facility]   COMPREHENSIVE EPILEPSY CENTER   REPORT OF ROUTINE VIDEO EEG     [facility]: [address], [place] [address], Ph#: [phone]  [facility]: [address], [place] [address], Ph#: [phone]  [facility]: [address] [place] [address], Ph#: [phone]    Patient Name: YODIT MORROW  Age and : 36y ([birth year])  MRN #: [id number]  Location: [facility] [address]  Referring Physician: Jefry Hatch    Study Date: 06-15-22    _____________________________________________________________  TECHNICAL INFORMATION    Placement and Labeling of Electrodes:  The EEG was performed utilizing 20 channels referential EEG connections (coronal over temporal over parasagittal montage) using all standard 10-20 electrode placements with EKG.  Recording was at a sampling rate of 256 samples per second per channel.  Time synchronized digital video recording was done simultaneously with EEG recording.  A low light infrared camera was used for low light recording.  Peter and seizure detection algorithms were utilized.    _____________________________________________________________  HISTORY    Patient is a 36y old  Female who presents with a chief complaint of right shoulder abscess (2022 22:22)      PERTINENT MEDICATION:  diVALproex  milliGRAM(s) Oral <User Schedule>  diVALproex ER 1000 milliGRAM(s) Oral <User Schedule>  _____________________________________________________________  STUDY INTERPRETATION    Findings: The background was continuous, spontaneously variable and reactive. Entire recording captured drowsy and asleep states only; no posterior dominant rhythm seen.    Background Slowing:  No generalized background slowing was present.    Focal Slowing:   None were present.    Sleep Background:  Drowsiness was characterized by fragmentation, attenuation, and slowing of the background activity.    Sleep was characterized by the presence of vertex waves, symmetric sleep spindles and K-complexes.    Other Non-Epileptiform Findings:  None were present.    Interictal Epileptiform Activity:   None were present.    Events:  Clinical events: None recorded.  Seizures: None recorded.    Activation Procedures:   Hyperventilation was not performed.    Photic stimulation was performed and did not elicit any abnormality.     Artifacts:  Intermittent myogenic and movement artifacts were noted.    ECG:  The heart rate on single channel ECG was predominantly between  BPM.    _____________________________________________________________  EEG SUMMARY/CLASSIFICATION    Normal EEG in the drowsy and asleep states.  _____________________________________________________________  EEG IMPRESSION/CLINICAL CORRELATE    Normal EEG study.  No epileptiform pattern or seizure seen.  Study technically limited by lack of observation of awake state    Aubrey Pickering MD PGY-5  Epilepsy Fellow    This Preliminary report is based on fellow review. Final report pending attending review.    Reading Room: [phone]  On Call Service After Hours: [phone] [facility]   COMPREHENSIVE EPILEPSY CENTER   REPORT OF ROUTINE VIDEO EEG     [facility]: [address], [place] [address], Ph#: [phone]  [facility]: [address], [place] [address], Ph#: [phone]  [facility]: [address] [place] [address], Ph#: [phone]    Patient Name: YODIT MORROW  Age and : 36y ([birth year])  MRN #: [id number]  Location: [facility] [address]  Referring Physician: Jefry Hatch    Study Date: 06-15-22    _____________________________________________________________  TECHNICAL INFORMATION    Placement and Labeling of Electrodes:  The EEG was performed utilizing 20 channels referential EEG connections (coronal over temporal over parasagittal montage) using all standard 10-20 electrode placements with EKG.  Recording was at a sampling rate of 256 samples per second per channel.  Time synchronized digital video recording was done simultaneously with EEG recording.  A low light infrared camera was used for low light recording.  Peter and seizure detection algorithms were utilized.    _____________________________________________________________  HISTORY    Patient is a 36y old  Female who presents with a chief complaint of right shoulder abscess (2022 22:22)      PERTINENT MEDICATION:  diVALproex  milliGRAM(s) Oral <User Schedule>  diVALproex ER 1000 milliGRAM(s) Oral <User Schedule>  _____________________________________________________________  STUDY INTERPRETATION    Findings: The background was continuous, spontaneously variable and reactive. Entire recording captured drowsy and asleep states only; no posterior dominant rhythm seen.    Background Slowing:  No generalized background slowing was present.    Focal Slowing:   None were present.    Sleep Background:  Drowsiness was characterized by fragmentation, attenuation, and slowing of the background activity.    Sleep was characterized by the presence of vertex waves, symmetric sleep spindles and K-complexes.    Other Non-Epileptiform Findings:  None were present.    Interictal Epileptiform Activity:   None were present.    Events:  Clinical events: None recorded.  Seizures: None recorded.    Activation Procedures:   Hyperventilation was not performed.    Photic stimulation was performed and did not elicit any abnormality.     Artifacts:  Intermittent myogenic and movement artifacts were noted.    ECG:  The heart rate on single channel ECG was predominantly between  BPM.    _____________________________________________________________  EEG SUMMARY/CLASSIFICATION    Normal EEG in the drowsy and asleep states.  _____________________________________________________________  EEG IMPRESSION/CLINICAL CORRELATE    Normal EEG study in the drowsy/sleep states.  No epileptiform pattern or seizure seen.  Study technically limited by lack of observation of awake state    Aubrey Pickering MD PGY-5  Epilepsy Fellow    Reading Room: [phone]  On Call Service After Hours: [phone]    Ulises Clay MD  Neurology Attending Physician

## 2022-06-15 NOTE — BH CONSULTATION LIAISON PROGRESS NOTE - NSBHASSESSMENTFT_PSY_ALL_CORE
This admission - clinical presentation much better than usual. + History involving aggressive, intentional behavior which is Pt's well known baseline and is NOT secondary to a primary psychotic or mood process but a manifestation of Patient's Axis II pathologies is likely to nonetheless occur. Patient has learned behavior of using her size to physically intimidate others and get what she wants, be it certain medications, food items or sabotage return to group home.  
This admission - clinical presentation much better than usual. + History involving aggressive, intentional behavior which is Pt's well known baseline and is NOT secondary to a primary psychotic or mood process but a manifestation of Patient's Axis II pathologies is likely to nonetheless occur. Patient has learned behavior of using her size to physically intimidate others and get what she wants, be it certain medications, food items or sabotage return to group home.

## 2022-06-15 NOTE — CHART NOTE - NSCHARTNOTEFT_GEN_A_CORE
Called by RN for pt Code Flight from 1b.  Pt was found in lobby and returned to room.  security standing by outside room.  Pt sitting up on side of bed.  She voices she is very frustrated that she took a nap during the day and is now unable to sleep and no one has offered her anything more than melatonin for sleep aide.  Pt states she is feeling "very agitated" and "I keep asking for something to calm me down and no one is giving me anything."    Pt known from prior admissions, has h/o acting out to get what she wants.  Review of chart shows recs for ativan prn so will give ativan 2mg ivp x 1 and diphen 25iv x 1 for agitation and restlessness.  avoiding im route given current infection.   pt agrees to stay in room and call staff for concerns  will continue to monitor.

## 2022-06-15 NOTE — PROGRESS NOTE ADULT - SUBJECTIVE AND OBJECTIVE BOX
Patient seen and examined this am. No new events    MEDICATIONS:    acetaminophen     Tablet .. 650 milliGRAM(s) Oral every 6 hours PRN  ALBUTerol    90 MICROgram(s) HFA Inhaler 2 Puff(s) Inhalation every 6 hours PRN  apixaban 5 milliGRAM(s) Oral every 12 hours  ARIPiprazole 30 milliGRAM(s) Oral daily  diphenhydrAMINE 25 milliGRAM(s) Oral every 6 hours PRN  diVALproex  milliGRAM(s) Oral <User Schedule>  diVALproex ER 1000 milliGRAM(s) Oral <User Schedule>  influenza   Vaccine 0.5 milliLiter(s) IntraMuscular once  levothyroxine 75 MICROGram(s) Oral daily  LORazepam     Tablet 0.5 milliGRAM(s) Oral two times a day  LORazepam   Injectable 2 milliGRAM(s) IntraMuscular every 6 hours PRN  lurasidone 40 milliGRAM(s) Oral daily  meclizine 25 milliGRAM(s) Oral every 6 hours PRN  melatonin 3 milliGRAM(s) Oral at bedtime PRN  mometasone 220 MICROgram(s) Inhaler 1 Puff(s) Inhalation daily  oxycodone    5 mG/acetaminophen 325 mG 2 Tablet(s) Oral every 6 hours PRN  pantoprazole    Tablet 40 milliGRAM(s) Oral before breakfast  QUEtiapine 50 milliGRAM(s) Oral <User Schedule>  QUEtiapine 200 milliGRAM(s) Oral <User Schedule>  vancomycin  IVPB 1250 milliGRAM(s) IV Intermittent every 12 hours      LABS:                          10.6   3.90  )-----------( 169      ( 2022 07:02 )             33.3     06-15    139  |  105  |  11  ----------------------------<  92  3.8   |  28  |  0.70    Ca    8.9      15 Júnior 2022 09:26  Phos  3.4     06-15  Mg     2.0     06-15      CAPILLARY BLOOD GLUCOSE          Urinalysis Basic - ( 2022 20:28 )    Color: Yellow / Appearance: Clear / S.015 / pH: x  Gluc: x / Ketone: Trace  / Bili: Negative / Urobili: 1 mg/dL   Blood: x / Protein: 15 mg/dL / Nitrite: Negative   Leuk Esterase: Negative / RBC: 11-25 /HPF / WBC 0-2   Sq Epi: x / Non Sq Epi: Occasional / Bacteria: Moderate      I&O's Summary    2022 07:01  -  15 Júnior 2022 07:00  --------------------------------------------------------  IN: 200 mL / OUT: 0 mL / NET: 200 mL    15 Júnior 2022 07:01  -  15 Júnior 2022 14:18  --------------------------------------------------------  IN: 240 mL / OUT: 600 mL / NET: -360 mL      Vital Signs Last 24 Hrs  T(C): 36.6 (15 Júnior 2022 10:58), Max: 36.7 (15 Júnior 2022 00:11)  T(F): 97.8 (15 Júnior 2022 10:58), Max: 98.1 (15 Júnior 2022 05:32)  HR: 104 (15 Júnior 2022 10:58) (93 - 106)  BP: 115/84 (15 Júnior 2022 10:58) (105/71 - 131/90)  BP(mean): --  RR: 17 (15 Júnior 2022 10:58) (17 - 18)  SpO2: 97% (15 Júnior 2022 10:58) (97% - 100%)      On Neurological Examination:    Mental Status - Patient is alert, awake, oriented X3. fluent, names, no dysarthria no aphasia Follows commands well and able to answer questions appropriately. Mood and affect  normal    Cranial Nerves - PERRL, EOMI, VFF, V1-V3 intact, no gross facial asymmetry, tongue/uvula midline    Motor Exam -   no drift     nml bulk/tone    Sensory    Intact to light touch and pinprick bilaterally    Coord: FTN intact bilaterally     Gait - deferred         LABS:  CBC Full  -  ( 2022 07:02 )  WBC Count : 3.90 K/uL  RBC Count : 3.53 M/uL  Hemoglobin : 10.6 g/dL  Hematocrit : 33.3 %  Platelet Count - Automated : 169 K/uL  Mean Cell Volume : 94.3 fl  Mean Cell Hemoglobin : 30.0 pg  Mean Cell Hemoglobin Concentration : 31.8 g/dL  Auto Neutrophil # : x  Auto Lymphocyte # : x  Auto Monocyte # : x  Auto Eosinophil # : x  Auto Basophil # : x  Auto Neutrophil % : x  Auto Lymphocyte % : x  Auto Monocyte % : x  Auto Eosinophil % : x  Auto Basophil % : x    Urinalysis Basic - ( 2022 20:28 )    Color: Yellow / Appearance: Clear / S.015 / pH: x  Gluc: x / Ketone: Trace  / Bili: Negative / Urobili: 1 mg/dL   Blood: x / Protein: 15 mg/dL / Nitrite: Negative   Leuk Esterase: Negative / RBC: 11-25 /HPF / WBC 0-2   Sq Epi: x / Non Sq Epi: Occasional / Bacteria: Moderate      06-14    139  |  107  |  9   ----------------------------<  83  4.7   |  26  |  0.72    Ca    9.0      2022 07:02  Phos  3.8     06-14  Mg     2.0     06-14        Hemoglobin A1C:       PT/INR - ( 2022 05:34 )   PT: 13.2 sec;   INR: 1.11 ratio         PTT - ( 2022 05:34 )  PTT:30.5 sec      RADIOLOGY  < from: CT Head No Cont (06.10.22 @ 01:05) >  IMPRESSION:  No CT evidence of acute intracranial pathology.    A mildly enlarged, heterogeneous pituitary gland with internal 6 mm   nodular hyperattenuating focus appears unchanged since CT of 2022;   based on MRI of 2022 this probably represents a   complex/proteinaceous Rathke cleft cyst.  A dedicated contrast-enhanced   pituitary MRImay be obtained as an outpatient to confirm the diagnosis   and to exclude any suspicious enhancement.    --- End of Report ---    < end of copied text >

## 2022-06-16 ENCOUNTER — TRANSCRIPTION ENCOUNTER (OUTPATIENT)
Age: 37
End: 2022-06-16

## 2022-06-16 VITALS
OXYGEN SATURATION: 100 % | DIASTOLIC BLOOD PRESSURE: 84 MMHG | RESPIRATION RATE: 18 BRPM | HEART RATE: 97 BPM | TEMPERATURE: 97 F | SYSTOLIC BLOOD PRESSURE: 122 MMHG

## 2022-06-16 LAB
NIGHT BLUE STAIN TISS: SIGNIFICANT CHANGE UP
SPECIMEN SOURCE: SIGNIFICANT CHANGE UP

## 2022-06-16 PROCEDURE — 99239 HOSP IP/OBS DSCHRG MGMT >30: CPT

## 2022-06-16 RX ORDER — OXYCODONE AND ACETAMINOPHEN 5; 325 MG/1; MG/1
1 TABLET ORAL ONCE
Refills: 0 | Status: DISCONTINUED | OUTPATIENT
Start: 2022-06-16 | End: 2022-06-16

## 2022-06-16 RX ORDER — FLUTICASONE PROPIONATE 220 MCG
1 AEROSOL WITH ADAPTER (GRAM) INHALATION
Qty: 0 | Refills: 0 | DISCHARGE

## 2022-06-16 RX ORDER — CHLORPROMAZINE HCL 10 MG
50 TABLET ORAL ONCE
Refills: 0 | Status: COMPLETED | OUTPATIENT
Start: 2022-06-16 | End: 2022-06-16

## 2022-06-16 RX ORDER — CHLORPROMAZINE HCL 10 MG
25 TABLET ORAL ONCE
Refills: 0 | Status: COMPLETED | OUTPATIENT
Start: 2022-06-16 | End: 2022-06-16

## 2022-06-16 RX ADMIN — MOMETASONE FUROATE 1 PUFF(S): 220 INHALANT RESPIRATORY (INHALATION) at 12:38

## 2022-06-16 RX ADMIN — Medication 75 MICROGRAM(S): at 06:25

## 2022-06-16 RX ADMIN — OXYCODONE AND ACETAMINOPHEN 2 TABLET(S): 5; 325 TABLET ORAL at 12:34

## 2022-06-16 RX ADMIN — LURASIDONE HYDROCHLORIDE 40 MILLIGRAM(S): 40 TABLET ORAL at 12:37

## 2022-06-16 RX ADMIN — Medication 25 MILLIGRAM(S): at 11:48

## 2022-06-16 RX ADMIN — OXYCODONE AND ACETAMINOPHEN 2 TABLET(S): 5; 325 TABLET ORAL at 06:24

## 2022-06-16 RX ADMIN — Medication 2 MILLIGRAM(S): at 09:18

## 2022-06-16 RX ADMIN — Medication 50 MILLIGRAM(S): at 11:31

## 2022-06-16 RX ADMIN — QUETIAPINE FUMARATE 50 MILLIGRAM(S): 200 TABLET, FILM COATED ORAL at 12:37

## 2022-06-16 RX ADMIN — OXYCODONE AND ACETAMINOPHEN 2 TABLET(S): 5; 325 TABLET ORAL at 16:07

## 2022-06-16 RX ADMIN — Medication 166.67 MILLIGRAM(S): at 06:29

## 2022-06-16 RX ADMIN — QUETIAPINE FUMARATE 200 MILLIGRAM(S): 200 TABLET, FILM COATED ORAL at 07:57

## 2022-06-16 RX ADMIN — OXYCODONE AND ACETAMINOPHEN 2 TABLET(S): 5; 325 TABLET ORAL at 07:24

## 2022-06-16 RX ADMIN — ARIPIPRAZOLE 30 MILLIGRAM(S): 15 TABLET ORAL at 12:37

## 2022-06-16 RX ADMIN — DIVALPROEX SODIUM 500 MILLIGRAM(S): 500 TABLET, DELAYED RELEASE ORAL at 11:51

## 2022-06-16 RX ADMIN — OXYCODONE AND ACETAMINOPHEN 2 TABLET(S): 5; 325 TABLET ORAL at 11:52

## 2022-06-16 RX ADMIN — Medication 25 MILLIGRAM(S): at 08:34

## 2022-06-16 RX ADMIN — PANTOPRAZOLE SODIUM 40 MILLIGRAM(S): 20 TABLET, DELAYED RELEASE ORAL at 08:18

## 2022-06-16 RX ADMIN — APIXABAN 5 MILLIGRAM(S): 2.5 TABLET, FILM COATED ORAL at 06:25

## 2022-06-16 NOTE — DISCHARGE NOTE NURSING/CASE MANAGEMENT/SOCIAL WORK - PATIENT PORTAL LINK FT
You can access the FollowMyHealth Patient Portal offered by [facility] by registering at the following website: [email or web address]. By joining [employer]’s FollowMyHealth portal, you will also be able to view your health information using other applications (apps) compatible with our system.

## 2022-06-16 NOTE — PROGRESS NOTE ADULT - PROVIDER SPECIALTY LIST ADULT
Hospitalist
Infectious Disease
Hospitalist
Neurology
Infectious Disease
Infectious Disease

## 2022-06-16 NOTE — DISCHARGE NOTE PROVIDER - HOSPITAL COURSE
36 years old female with history of bipolar disorder, hypothyroidism, factor V Leiden with history of VTE on apixaban, seizure disorder presented w/ right shoulder pain for 1 week after being given IM medication on right shoulder 1-2 week ago & was admitted with right shoulder fluid collection.     right shoulder abscess   - CT shoulder with fluid collection 6.5x 4.2x4.4cm at lateral deltoid region    - status post I&D in ED with 40ml of purulent drainage  - IR drained 15 cc of mildly cloudy serosanguinous drainage on 6/13  - Discussed with ID>>> plan is doxycycline till 6/23/22. ID will follow AFB fluid.       Seizure disorder  - c/w Depakote    - she has a history of pseudoseizures and aggressive behavior)    - EEG no epileptiform activity.   - neurology note read and appreciated  - psych note read and appreciated     Peripheral vertigo  - likely from right ear as per neuro, can follow up w/ ENT as outpt  - c/w Meclizine 25 mg PRN     Bipolar disorder  - c/w Aripiprazole, Latuda, Depakote, quetiapine & ativan  - psych note read and appreciated     Hypothyroidism  - c/w Synthroid      Factor V Leiden w/ DVT  - c/w Eliquis     Asthma  - c/w Asthmanex and albuterol PRN      Seen and examined by me today. Vitals stable.   I have discussed all the inpatient indings with the patient and stressed that patient follows with the PCP for further outpatient care. I have educated patient to use [facility] patient portal (as instructed on the discharge paperwork) to access medical records outside the hospital.   All questions welcomed and answered appropriately. Patient verbalized understanding of post discharge physician's follows up and discharge instructions.   DC time spent by me excluding billable procedures 38 mins

## 2022-06-16 NOTE — PROGRESS NOTE ADULT - ASSESSMENT
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 Reported intermittent shooting 10/10 pain associated with chills. ? Had IM medication on right shoulder 1-2 week ago.     Found to have right deltoid fluid collection, with CT showing 6.5 x 4.2 x 4.4 cm collection. Unclear if infectious vs hematoma, plan for IR aspiration. S/p I&D in the ED, with 40ml purulent drainage obtained per procedure note. However fluid cultures currently no growth. Blood cultures also currently no growth. She has no fevers but has some leukopenia, WBC stable at 3.7.     6/13: s/p RRT yesterday evening - pseudoseizure, no fevers, tachycardic, on RA, no WBC, BCS with no growth to date, abscess cx - no growth thus far, Cr ok, s/p IR drainage today, cultures were sent, Vancomycin IV continued. Left LE is tender on exam - US venous doppler study ordered; complained of mild dysuria, UA ordered.   6/14: right arm with less swelling and less tender on today's exam, yesterday's fluid culture is testing, smear - no organisms seen, the pt is s/p US venous doppler study of b/l LEs - pending reading, no longer has pain in her legs, no fevers, no WBC, UA negative, BCs with no growth to date, Vanco continued for now, Vanco trough today at 16:30, discussed with RN.   6/16: afebrile, no new lab work, ED aspirated fluid culture and IR aspirated fluid cultures with no growth to date, fluid collected in ED looked like pus, pt's arm swelling has improved significantly, there is no erythema or warmth on today's exam, still mildly tender with palpation. Called the lab and asked to add on fluid AFB testing, in progress now. Vancomycin has been continued while in the hospital, the pt has reported allergy to PCN (hives), will discharge on po doxycycline to complete 10 days course from last aspiration.     - continue vancomycin while in the hospital  - Ok to discharge on po doxycycline to complete 10 days course from the last aspiration  - follow up with PCP as op  - follow up on AFB fluid culture (ordered and spoke with lab today)    discussed with the pt  discussed with Dr. Hatch  discussed with Dr. Houston

## 2022-06-16 NOTE — DISCHARGE NOTE PROVIDER - NSDCFUADDINST_GEN_ALL_CORE_FT
1) It is important to see your primary physician as well as other necessary consultants within next 7-10 days to perform a comprehensive medical review.  Call their offices for an appointment as soon as you leave the hospital.  If you do not have a primary physician or unable to reach your PCP, contact the [facility] Physician Referral Service at [phone].  Your medical issues appear to be stable at this time, but if your symptoms recur or worsen, contact your physicians and/or return to the hospital if necessary.  If you encounter any issues or questions with your medication, call your physicians before stopping the medication.    2) Please access [facility] Patient portal (as instructed on the discharge paperwork) to access your medical records at any time after discharge.

## 2022-06-16 NOTE — PROGRESS NOTE ADULT - REASON FOR ADMISSION
right shoulder abscess

## 2022-06-16 NOTE — DISCHARGE NOTE PROVIDER - NSDCMRMEDTOKEN_GEN_ALL_CORE_FT
Ala-Maximo 1% topical cream: 1 application topically once  albuterol 90 mcg/inh inhalation aerosol: 2 puff(s) inhaled every 6 hours, As needed, Shortness of Breath and/or Wheezing  ARIPiprazole 30 mg oral tablet: 1 tab(s) orally once a day  Ativan 0.5 mg oral tablet: 1 tab(s) orally 2 times a day  [10AM and 4PM] MDD:1mg  Cepacol Sore Throat Cherry Sugar Free 15 mg-3.6 mg mucous membrane lozenge: 1 lozenge orally every 2 hours   chlorproMAZINE 50 mg oral tablet: 1 tab(s) orally every 6 hours, As needed, agitation/aggression  Claritin 10 mg oral tablet: 1 tab(s) orally once a day  Depakote  mg oral tablet, extended release: 3 tab(s) orally once a day 500 mg in AM and 1000mg bedtime  diphenhydrAMINE 25 mg oral capsule: 1 cap(s) orally every 6 hours, As needed, Rash and/or Itching  doxycycline hyclate 100 mg oral capsule: 1 cap(s) orally 2 times a day   Eliquis 5 mg oral tablet: 1 tab(s) orally 2 times a day   fluticasone 50 mcg/inh nasal spray: 1 spray(s) nasal 2 times a day  levothyroxine 75 mcg (0.075 mg) oral tablet: 1 tab(s) orally once a day  lurasidone 40 mg oral tablet: 1 tab(s) orally once a day  melatonin 3 mg oral tablet: 1 tab(s) orally once a day (at bedtime)  menthol-benzocaine 3.6 mg-15 mg mucous membrane lozenge: 1 lozenge mucous membrane every 4 hours as needed  mometasone 220 mcg/inh inhalation aerosol powder: 1 puff(s) inhaled once a day  Multiple Vitamins with Iron oral tablet: 1 tab(s) orally once a day  olopatadine 0.1% ophthalmic solution: 1 drop(s) to each affected eye every 6 hours, As Needed  oxyCODONE-acetaminophen 5 mg-325 mg oral tablet: 1 tab(s) orally 3 times a day MDD:3  pantoprazole 40 mg oral delayed release tablet: 1 tab(s) orally once a day (before a meal)  Percocet 5 mg-325 mg oral tablet: 1 tab(s) orally every 6 hours, As needed, Severe Pain (7 - 10) MDD:4  QUEtiapine 50 mg oral tablet: 1 tab(s) orally once a day at noon  SEROquel 200 mg oral tablet: 1 tab(s) orally 2 times a day [8AM and 8PM]  tiZANidine 2 mg oral tablet: 1 tab(s) orally 2 times a day, As Needed -Muscle Spasm    Tylenol 325 mg oral tablet: 2 tab(s) orally every 6 hours, As Needed  Tylenol Extra Strength 500 mg oral tablet: 2 tab(s) orally every 8 hours   Vitamin D2 1.25 mg (50,000 intl units) oral capsule: 1 cap(s) orally once a week

## 2022-06-16 NOTE — DISCHARGE NOTE NURSING/CASE MANAGEMENT/SOCIAL WORK - NSDCPEFALRISK_GEN_ALL_CORE
For information on Fall & Injury Prevention, visit: [email or web address] OR  [email or web address] OR  https://www.cdc.gov/steadi/patient.html

## 2022-06-16 NOTE — PROGRESS NOTE ADULT - SUBJECTIVE AND OBJECTIVE BOX
YODIT MORROW  MRN-[id number]    Follow Up:  right arm abscess    Interval History: Chart reviewed, the pt was seen and examined earlier, sitting on her bed, no distress, afebrile, on RA, no new lab work.     PAST MEDICAL & SURGICAL HISTORY:  Asthma      Seizure      Factor V Leiden      Bipolar disorder      Endometriosis      History of DVT in adulthood  RLE on eliquis      Hypothyroid      Seasonal allergies      Insomnia      GERD (gastroesophageal reflux disease)      No significant past surgical history          ROS:    [ ] Unobtainable because:  [x ] All other systems negative    Constitutional: no fever, no chills  Head: no trauma  Eyes: no vision changes, no eye pain  ENT:  no sore throat, no rhinorrhea  Cardiovascular:  no chest pain, no palpitation  Respiratory:  no SOB, no cough  GI:  no abd pain, no vomiting, no diarrhea  urinary: no dysuria, no hematuria, no flank pain  musculoskeletal:  right arm pain is better  skin:  no rash  neurology:  no headache, no seizure, no change in mental status  psych: no anxiety, no depression         Allergies  latex (Blisters)  penicillin (Hives)  penicillin (Other)  pineapple (Unknown)  Toradol (Rash)  Toradol (Unknown)  Zofran (Hives)  Zofran (Unknown)        ANTIMICROBIALS:  vancomycin  IVPB 1250 every 12 hours      OTHER MEDS:  acetaminophen     Tablet .. 650 milliGRAM(s) Oral every 6 hours PRN  ALBUTerol    90 MICROgram(s) HFA Inhaler 2 Puff(s) Inhalation every 6 hours PRN  apixaban 5 milliGRAM(s) Oral every 12 hours  ARIPiprazole 30 milliGRAM(s) Oral daily  diphenhydrAMINE 25 milliGRAM(s) Oral every 6 hours PRN  diVALproex  milliGRAM(s) Oral <User Schedule>  diVALproex ER 1000 milliGRAM(s) Oral <User Schedule>  influenza   Vaccine 0.5 milliLiter(s) IntraMuscular once  levothyroxine 75 MICROGram(s) Oral daily  LORazepam     Tablet 0.5 milliGRAM(s) Oral two times a day  LORazepam   Injectable 2 milliGRAM(s) IntraMuscular every 6 hours PRN  lurasidone 40 milliGRAM(s) Oral daily  meclizine 25 milliGRAM(s) Oral every 6 hours PRN  melatonin 3 milliGRAM(s) Oral at bedtime PRN  mometasone 220 MICROgram(s) Inhaler 1 Puff(s) Inhalation daily  oxycodone    5 mG/acetaminophen 325 mG 2 Tablet(s) Oral every 6 hours PRN  pantoprazole    Tablet 40 milliGRAM(s) Oral before breakfast  QUEtiapine 50 milliGRAM(s) Oral <User Schedule>  QUEtiapine 200 milliGRAM(s) Oral <User Schedule>      Vital Signs Last 24 Hrs  T(C): 36.6 (16 Jun 2022 06:00), Max: 36.8 (16 Jun 2022 00:35)  T(F): 97.9 (16 Jun 2022 06:00), Max: 98.2 (16 Jun 2022 00:35)  HR: 95 (16 Jun 2022 06:00) (90 - 120)  BP: 111/80 (16 Jun 2022 06:00) (103/79 - 111/80)  BP(mean): --  RR: 17 (16 Jun 2022 06:00) (17 - 17)  SpO2: 100% (16 Jun 2022 06:00) (96% - 100%)    Physical Exam:  General:    NAD,  non toxic, on RA, obese  Head: atraumatic, normocephalic  Eye: normal sclera and conjunctiva  ENT:    no oral lesions, neck supple  Cardio:     regular S1, S2,  no murmur  Respiratory:    clear b/l,    no wheezing, no rhonchi, no rales  abd:     soft,   BS +,   no tenderness, not distended, no guarding, no rebound   :   no CVAT,  no suprapubic tenderness,   no  markham  Musculoskeletal:   no joint swelling,   no edema  vascular: no central lines, no PIV  Skin:    no rash, right deltoid with less swelling, no erythema, significantly less tender, no warmth, no active drainage, small amount of drainage on the dressing   Neurologic:     no focal deficit, answers questions and follows commands  psych: appropriate    WBC Count: 3.90 K/uL (06-14 @ 07:02)  WBC Count: 4.28 K/uL (06-13 @ 05:34)  WBC Count: 3.70 K/uL (06-12 @ 08:01)  WBC Count: 3.76 K/uL (06-11 @ 10:32)  WBC Count: 5.38 K/uL (06-10 @ 00:52)          06-15    139  |  105  |  11  ----------------------------<  92  3.8   |  28  |  0.70    Ca    8.9      15 Júnior 2022 09:26  Phos  3.4     06-15  Mg     2.0     06-15            Creatinine Trend: 0.70<--, 0.72<--, 0.70<--, 0.80<--, 0.78<--, 0.94<--      MICROBIOLOGY:  v  .Body Fluid righ arm fluid collection  06-13-22   No growth  --    No polymorphonuclear cells seen per low power field  No organisms seen per oil power field      .Abscess Arm - Right  06-10-22   No growth at 5 days  --  --      .Blood Blood-Peripheral  06-10-22   No Growth Final  --  --      Clean Catch Clean Catch (Midstream)  06-04-22   >=3 organisms. Probable collection contamination.  --  --      .Blood Blood  05-24-22   No Growth Final  --  --      Clean Catch Clean Catch (Midstream)  05-23-22   >=3 organisms. Probable collection contamination.  --  --      Rapid RVP Result: NotDetec (06-14 @ 07:28)        SARS-CoV-2: NotDetec (06-14-22 @ 07:28)  Rapid RVP Result: NotDetec (06-14-22 @ 07:28)    SARS-CoV-2: NotDetec (14 Jun 2022 07:28)  SARS-CoV-2: NotDetec (03 Jun 2022 14:31)  SARS-CoV-2: NotDetec (24 May 2022 16:40)  SARS-CoV-2: NotDetec (16 Apr 2022 18:56)  SARS-CoV-2: NotDetec (08 Apr 2022 00:53)  COVID-19 PCR: NotDetec (07 Apr 2022 11:25)  COVID-19 PCR: NotDetec (01 Apr 2022 19:37)  COVID-19 PCR: NotDetec (29 Dec 2021 18:23)    RADIOLOGY:

## 2022-06-16 NOTE — DISCHARGE NOTE PROVIDER - NSDCCPCAREPLAN_GEN_ALL_CORE_FT
PRINCIPAL DISCHARGE DIAGNOSIS  Diagnosis: Abscess of right shoulder  Assessment and Plan of Treatment:

## 2022-06-16 NOTE — DISCHARGE NOTE PROVIDER - NSDCFUSCHEDAPPT_GEN_ALL_CORE_FT
Raoul Weller  [facility]  CARDIOLOGY [address]  Scheduled Appointment: 07/11/2022    [address]  Scheduled Appointment: 07/22/2022    Omar Hernandez  [address]  Scheduled Appointment: 07/22/2022

## 2022-06-17 ENCOUNTER — INPATIENT (INPATIENT)
Facility: HOSPITAL | Age: 37
LOS: 14 days | Discharge: HOME | End: 2022-07-02
Attending: STUDENT IN AN ORGANIZED HEALTH CARE EDUCATION/TRAINING PROGRAM | Admitting: STUDENT IN AN ORGANIZED HEALTH CARE EDUCATION/TRAINING PROGRAM
Payer: MEDICARE

## 2022-06-17 VITALS
HEART RATE: 118 BPM | OXYGEN SATURATION: 100 % | TEMPERATURE: 98 F | DIASTOLIC BLOOD PRESSURE: 86 MMHG | HEIGHT: 68 IN | SYSTOLIC BLOOD PRESSURE: 132 MMHG | RESPIRATION RATE: 16 BRPM

## 2022-06-17 LAB
ALBUMIN SERPL ELPH-MCNC: 3.8 G/DL — SIGNIFICANT CHANGE UP (ref 3.3–5)
ALP SERPL-CCNC: 44 U/L — SIGNIFICANT CHANGE UP (ref 40–120)
ALT FLD-CCNC: 19 U/L — SIGNIFICANT CHANGE UP (ref 4–33)
ANION GAP SERPL CALC-SCNC: 12 MMOL/L — SIGNIFICANT CHANGE UP (ref 7–14)
AST SERPL-CCNC: 15 U/L — SIGNIFICANT CHANGE UP (ref 4–32)
BASE EXCESS BLDV CALC-SCNC: 2 MMOL/L — SIGNIFICANT CHANGE UP (ref -2–3)
BASOPHILS # BLD AUTO: 0.02 K/UL — SIGNIFICANT CHANGE UP (ref 0–0.2)
BASOPHILS NFR BLD AUTO: 0.3 % — SIGNIFICANT CHANGE UP (ref 0–2)
BILIRUB SERPL-MCNC: 0.2 MG/DL — SIGNIFICANT CHANGE UP (ref 0.2–1.2)
BLOOD GAS VENOUS COMPREHENSIVE RESULT: SIGNIFICANT CHANGE UP
BUN SERPL-MCNC: 10 MG/DL — SIGNIFICANT CHANGE UP (ref 7–23)
CALCIUM SERPL-MCNC: 9.3 MG/DL — SIGNIFICANT CHANGE UP (ref 8.4–10.5)
CHLORIDE BLDV-SCNC: 107 MMOL/L — SIGNIFICANT CHANGE UP (ref 96–108)
CHLORIDE SERPL-SCNC: 106 MMOL/L — SIGNIFICANT CHANGE UP (ref 98–107)
CO2 BLDV-SCNC: 30.6 MMOL/L — HIGH (ref 22–26)
CO2 SERPL-SCNC: 23 MMOL/L — SIGNIFICANT CHANGE UP (ref 22–31)
CREAT SERPL-MCNC: 0.89 MG/DL — SIGNIFICANT CHANGE UP (ref 0.5–1.3)
EGFR: 87 ML/MIN/1.73M2 — SIGNIFICANT CHANGE UP
EOSINOPHIL # BLD AUTO: 0.03 K/UL — SIGNIFICANT CHANGE UP (ref 0–0.5)
EOSINOPHIL NFR BLD AUTO: 0.4 % — SIGNIFICANT CHANGE UP (ref 0–6)
GAS PNL BLDV: 137 MMOL/L — SIGNIFICANT CHANGE UP (ref 136–145)
GLUCOSE BLDV-MCNC: 80 MG/DL — SIGNIFICANT CHANGE UP (ref 70–99)
GLUCOSE SERPL-MCNC: 82 MG/DL — SIGNIFICANT CHANGE UP (ref 70–99)
HCO3 BLDV-SCNC: 29 MMOL/L — SIGNIFICANT CHANGE UP (ref 22–29)
HCT VFR BLD CALC: 33.8 % — LOW (ref 34.5–45)
HCT VFR BLDA CALC: 31 % — LOW (ref 34.5–46.5)
HGB BLD CALC-MCNC: 10.4 G/DL — LOW (ref 11.5–15.5)
HGB BLD-MCNC: 10.6 G/DL — LOW (ref 11.5–15.5)
IANC: 4.6 K/UL — SIGNIFICANT CHANGE UP (ref 1.8–7.4)
IMM GRANULOCYTES NFR BLD AUTO: 0.3 % — SIGNIFICANT CHANGE UP (ref 0–1.5)
LACTATE BLDV-MCNC: 1.6 MMOL/L — SIGNIFICANT CHANGE UP (ref 0.5–2)
LYMPHOCYTES # BLD AUTO: 2.1 K/UL — SIGNIFICANT CHANGE UP (ref 1–3.3)
LYMPHOCYTES # BLD AUTO: 27.9 % — SIGNIFICANT CHANGE UP (ref 13–44)
MCHC RBC-ENTMCNC: 30 PG — SIGNIFICANT CHANGE UP (ref 27–34)
MCHC RBC-ENTMCNC: 31.4 GM/DL — LOW (ref 32–36)
MCV RBC AUTO: 95.8 FL — SIGNIFICANT CHANGE UP (ref 80–100)
MONOCYTES # BLD AUTO: 0.76 K/UL — SIGNIFICANT CHANGE UP (ref 0–0.9)
MONOCYTES NFR BLD AUTO: 10.1 % — SIGNIFICANT CHANGE UP (ref 2–14)
NEUTROPHILS # BLD AUTO: 4.6 K/UL — SIGNIFICANT CHANGE UP (ref 1.8–7.4)
NEUTROPHILS NFR BLD AUTO: 61 % — SIGNIFICANT CHANGE UP (ref 43–77)
NRBC # BLD: 0 /100 WBCS — SIGNIFICANT CHANGE UP
NRBC # FLD: 0 K/UL — SIGNIFICANT CHANGE UP
PCO2 BLDV: 56 MMHG — HIGH (ref 39–42)
PH BLDV: 7.32 — SIGNIFICANT CHANGE UP (ref 7.32–7.43)
PLATELET # BLD AUTO: 136 K/UL — LOW (ref 150–400)
PO2 BLDV: 29 MMHG — SIGNIFICANT CHANGE UP
POTASSIUM BLDV-SCNC: 4.5 MMOL/L — SIGNIFICANT CHANGE UP (ref 3.5–5.1)
POTASSIUM SERPL-MCNC: 4.7 MMOL/L — SIGNIFICANT CHANGE UP (ref 3.5–5.3)
POTASSIUM SERPL-SCNC: 4.7 MMOL/L — SIGNIFICANT CHANGE UP (ref 3.5–5.3)
PROT SERPL-MCNC: 6.8 G/DL — SIGNIFICANT CHANGE UP (ref 6–8.3)
RBC # BLD: 3.53 M/UL — LOW (ref 3.8–5.2)
RBC # FLD: 15.3 % — HIGH (ref 10.3–14.5)
SAO2 % BLDV: 39.8 % — SIGNIFICANT CHANGE UP
SODIUM SERPL-SCNC: 141 MMOL/L — SIGNIFICANT CHANGE UP (ref 135–145)
WBC # BLD: 7.53 K/UL — SIGNIFICANT CHANGE UP (ref 3.8–10.5)
WBC # FLD AUTO: 7.53 K/UL — SIGNIFICANT CHANGE UP (ref 3.8–10.5)

## 2022-06-17 PROCEDURE — 99285 EMERGENCY DEPT VISIT HI MDM: CPT

## 2022-06-17 PROCEDURE — 73201 CT UPPER EXTREMITY W/DYE: CPT | Mod: 26,LT,MA

## 2022-06-17 RX ORDER — MORPHINE SULFATE 50 MG/1
4 CAPSULE, EXTENDED RELEASE ORAL ONCE
Refills: 0 | Status: DISCONTINUED | OUTPATIENT
Start: 2022-06-17 | End: 2022-06-17

## 2022-06-17 RX ORDER — OXYCODONE HYDROCHLORIDE 5 MG/1
5 TABLET ORAL ONCE
Refills: 0 | Status: DISCONTINUED | OUTPATIENT
Start: 2022-06-17 | End: 2022-06-17

## 2022-06-17 RX ORDER — PIPERACILLIN AND TAZOBACTAM 4; .5 G/20ML; G/20ML
3.38 INJECTION, POWDER, LYOPHILIZED, FOR SOLUTION INTRAVENOUS ONCE
Refills: 0 | Status: DISCONTINUED | OUTPATIENT
Start: 2022-06-17 | End: 2022-06-17

## 2022-06-17 RX ORDER — CEFEPIME 1 G/1
2000 INJECTION, POWDER, FOR SOLUTION INTRAMUSCULAR; INTRAVENOUS ONCE
Refills: 0 | Status: COMPLETED | OUTPATIENT
Start: 2022-06-17 | End: 2022-06-17

## 2022-06-17 RX ORDER — VANCOMYCIN HCL 1 G
1000 VIAL (EA) INTRAVENOUS ONCE
Refills: 0 | Status: COMPLETED | OUTPATIENT
Start: 2022-06-17 | End: 2022-06-17

## 2022-06-17 RX ORDER — DIPHENHYDRAMINE HCL 50 MG
50 CAPSULE ORAL ONCE
Refills: 0 | Status: COMPLETED | OUTPATIENT
Start: 2022-06-17 | End: 2022-06-17

## 2022-06-17 RX ORDER — SODIUM CHLORIDE 9 MG/ML
1000 INJECTION INTRAMUSCULAR; INTRAVENOUS; SUBCUTANEOUS ONCE
Refills: 0 | Status: COMPLETED | OUTPATIENT
Start: 2022-06-17 | End: 2022-06-17

## 2022-06-17 RX ADMIN — MORPHINE SULFATE 4 MILLIGRAM(S): 50 CAPSULE, EXTENDED RELEASE ORAL at 23:44

## 2022-06-17 RX ADMIN — Medication 50 MILLIGRAM(S): at 23:44

## 2022-06-17 RX ADMIN — CEFEPIME 100 MILLIGRAM(S): 1 INJECTION, POWDER, FOR SOLUTION INTRAMUSCULAR; INTRAVENOUS at 23:39

## 2022-06-17 RX ADMIN — OXYCODONE HYDROCHLORIDE 5 MILLIGRAM(S): 5 TABLET ORAL at 22:00

## 2022-06-17 NOTE — ED ADULT NURSE NOTE - CHIEF COMPLAINT QUOTE
Pt BIBEMS from home, c/o pain to Lt arm. Pt went to [facility] 2 days ago and received treatment for abscess to Rt arm. Pt endorses that she received 2 IM injections to Lt arm and endorses pain to Lt arm ever since hospital visit. Pt has limited ROM to Lt arm, denies heavy lifting or further injury, afebrile.

## 2022-06-17 NOTE — ED PROVIDER NOTE - CLINICAL SUMMARY MEDICAL DECISION MAKING FREE TEXT BOX
35yo F w/ hx bipolar disorder, hypothyroidism, factor V Leiden with history of VTE on apixaban, seizure disorder presenting with L arm erythema, ttp, firmness with increasing pain since 2d ago after getting IM injections. likely abscess. no fever or other complaints. will get CT, labs.

## 2022-06-17 NOTE — ED ADULT NURSE NOTE - OBJECTIVE STATEMENT
Pt A&Ox4  ambulatory, PMH Endometriosis, DVT, GERD, Bipolar Disorder, seizure, presenting to the ED (Intake 5) c/o left arm pain and swelling x 2 days. Pt states has a history of DVT, states is on apixaban. Pt states left upper arm pain x 2 days, endorses increased pain upon movement and taking deep breaths. Pt states cp and sob associated to left upper pain. Endorses occasional nausea and chills. Denies palpitations, dizziness, lightheadedness, v/d, fever, cough, HA, blurry vision. MD at bedside [facility] for USIV, ucg tested, respirations are even and unlabored, partial ROM noted on left arm, erythema, swelling and firmness noted on left upper arm. Safety precautions implemented as per protocol, awaiting further MD orders, will continue to monitor.

## 2022-06-17 NOTE — ED PROVIDER NOTE - ATTENDING CONTRIBUTION TO CARE
I (Mitchell) agree with above, I performed a history and physical. Counseled jean marie medical staff, physician assistant, and/or medical student on medical decision making as documented. Medical decisions and treatment interventions were made in real time during the patient encounter. Additionally and/or with the following exceptions: The patient presented to the ED with pain in left upper extremity. As per chart review, was recently admitted for abscess of rue likely secondary to contaminated IM injection of psychotropic medications. Suspected same due to induration of LUE. Covered empirically with abx, The patient was signed out to the oncoming attending pending the following: ct imaging of lue and consultation as appropriate.

## 2022-06-17 NOTE — ED ADULT TRIAGE NOTE - CHIEF COMPLAINT QUOTE
Pt BIBEMS from home, c/o pain to Lt arm. Pt went to [facility] 2 days ago and received treatment for abscess to Rt arm. Pt endorses that she received 2 IM injections to Lt arm and endorses pain to Lt arm ever since hospital visit. Pt has limited ROM to Lt arm, denies heavy lifting or further injury, afebrile.

## 2022-06-17 NOTE — ED PROVIDER NOTE - PHYSICAL EXAMINATION
General appearance: NAD, conversant, afebrile    Eyes: anicteric sclerae, LIAM, EOMI   HENT: Atraumatic; oropharynx clear, MMM and no ulcerations, no pharyngeal erythema or exudate   Neck: Trachea midline; Full range of motion, supple   Pulm: CTA bl, normal respiratory effort and no intercostal retractions, normal work of breathing   CV: RRR, No murmurs, rubs, or gallops.    Abdomen: Soft, non-tender, non-distended; no guarding or rebound   Extremities: L upper arm erythema, warmth, severe ttp with firmness. 5/5 strength in all four extremities.   Skin: Dry, normal temperature, turgor and texture; no rash, ulcers or subcutaneous nodules   Psych: Appropriate affect, cooperative; alert and oriented to person, place and time

## 2022-06-18 DIAGNOSIS — A41.9 SEPSIS, UNSPECIFIED ORGANISM: ICD-10-CM

## 2022-06-18 DIAGNOSIS — D68.51 ACTIVATED PROTEIN C RESISTANCE: ICD-10-CM

## 2022-06-18 DIAGNOSIS — F79 UNSPECIFIED INTELLECTUAL DISABILITIES: ICD-10-CM

## 2022-06-18 DIAGNOSIS — F63.9 IMPULSE DISORDER, UNSPECIFIED: ICD-10-CM

## 2022-06-18 DIAGNOSIS — E03.9 HYPOTHYROIDISM, UNSPECIFIED: ICD-10-CM

## 2022-06-18 DIAGNOSIS — L02.414 CUTANEOUS ABSCESS OF LEFT UPPER LIMB: ICD-10-CM

## 2022-06-18 DIAGNOSIS — R56.9 UNSPECIFIED CONVULSIONS: ICD-10-CM

## 2022-06-18 DIAGNOSIS — M79.89 OTHER SPECIFIED SOFT TISSUE DISORDERS: ICD-10-CM

## 2022-06-18 DIAGNOSIS — Z29.9 ENCOUNTER FOR PROPHYLACTIC MEASURES, UNSPECIFIED: ICD-10-CM

## 2022-06-18 DIAGNOSIS — L02.419 CUTANEOUS ABSCESS OF LIMB, UNSPECIFIED: ICD-10-CM

## 2022-06-18 DIAGNOSIS — F31.9 BIPOLAR DISORDER, UNSPECIFIED: ICD-10-CM

## 2022-06-18 LAB
A1C WITH ESTIMATED AVERAGE GLUCOSE RESULT: 4.9 % — SIGNIFICANT CHANGE UP (ref 4–5.6)
CK SERPL-CCNC: 55 U/L — SIGNIFICANT CHANGE UP (ref 25–170)
CULTURE RESULTS: NO GROWTH — SIGNIFICANT CHANGE UP
ESTIMATED AVERAGE GLUCOSE: 94 — SIGNIFICANT CHANGE UP
FLUAV AG NPH QL: SIGNIFICANT CHANGE UP
FLUBV AG NPH QL: SIGNIFICANT CHANGE UP
GRAM STN FLD: SIGNIFICANT CHANGE UP
MAGNESIUM SERPL-MCNC: 1.7 MG/DL — SIGNIFICANT CHANGE UP (ref 1.6–2.6)
PHOSPHATE SERPL-MCNC: 4.6 MG/DL — HIGH (ref 2.5–4.5)
PROCALCITONIN SERPL-MCNC: 0.04 NG/ML — SIGNIFICANT CHANGE UP (ref 0.02–0.1)
RSV RNA NPH QL NAA+NON-PROBE: SIGNIFICANT CHANGE UP
SARS-COV-2 RNA SPEC QL NAA+PROBE: SIGNIFICANT CHANGE UP
SPECIMEN SOURCE: SIGNIFICANT CHANGE UP
TSH SERPL-MCNC: 0.37 UIU/ML — SIGNIFICANT CHANGE UP (ref 0.27–4.2)

## 2022-06-18 PROCEDURE — 99223 1ST HOSP IP/OBS HIGH 75: CPT

## 2022-06-18 PROCEDURE — 93970 EXTREMITY STUDY: CPT | Mod: 26

## 2022-06-18 PROCEDURE — 12345: CPT | Mod: NC,GC

## 2022-06-18 RX ORDER — OXYCODONE HYDROCHLORIDE 5 MG/1
5 TABLET ORAL EVERY 4 HOURS
Refills: 0 | Status: DISCONTINUED | OUTPATIENT
Start: 2022-06-18 | End: 2022-06-25

## 2022-06-18 RX ORDER — CHLORPROMAZINE HCL 10 MG
50 TABLET ORAL EVERY 4 HOURS
Refills: 0 | Status: DISCONTINUED | OUTPATIENT
Start: 2022-06-18 | End: 2022-06-28

## 2022-06-18 RX ORDER — LURASIDONE HYDROCHLORIDE 40 MG/1
40 TABLET ORAL DAILY
Refills: 0 | Status: DISCONTINUED | OUTPATIENT
Start: 2022-06-18 | End: 2022-07-02

## 2022-06-18 RX ORDER — DIPHENHYDRAMINE HCL 50 MG
50 CAPSULE ORAL EVERY 8 HOURS
Refills: 0 | Status: DISCONTINUED | OUTPATIENT
Start: 2022-06-18 | End: 2022-06-22

## 2022-06-18 RX ORDER — LANOLIN ALCOHOL/MO/W.PET/CERES
3 CREAM (GRAM) TOPICAL AT BEDTIME
Refills: 0 | Status: DISCONTINUED | OUTPATIENT
Start: 2022-06-18 | End: 2022-07-02

## 2022-06-18 RX ORDER — QUETIAPINE FUMARATE 200 MG/1
50 TABLET, FILM COATED ORAL
Refills: 0 | Status: DISCONTINUED | OUTPATIENT
Start: 2022-06-18 | End: 2022-07-02

## 2022-06-18 RX ORDER — POLYETHYLENE GLYCOL 3350 17 G/17G
17 POWDER, FOR SOLUTION ORAL DAILY
Refills: 0 | Status: DISCONTINUED | OUTPATIENT
Start: 2022-06-18 | End: 2022-06-27

## 2022-06-18 RX ORDER — LANOLIN ALCOHOL/MO/W.PET/CERES
6 CREAM (GRAM) TOPICAL AT BEDTIME
Refills: 0 | Status: DISCONTINUED | OUTPATIENT
Start: 2022-06-18 | End: 2022-06-18

## 2022-06-18 RX ORDER — LANOLIN ALCOHOL/MO/W.PET/CERES
3 CREAM (GRAM) TOPICAL AT BEDTIME
Refills: 0 | Status: DISCONTINUED | OUTPATIENT
Start: 2022-06-18 | End: 2022-06-18

## 2022-06-18 RX ORDER — LORATADINE 10 MG/1
1 TABLET ORAL
Qty: 0 | Refills: 0 | DISCHARGE

## 2022-06-18 RX ORDER — DIVALPROEX SODIUM 500 MG/1
500 TABLET, DELAYED RELEASE ORAL DAILY
Refills: 0 | Status: DISCONTINUED | OUTPATIENT
Start: 2022-06-18 | End: 2022-07-02

## 2022-06-18 RX ORDER — DIVALPROEX SODIUM 500 MG/1
1000 TABLET, DELAYED RELEASE ORAL AT BEDTIME
Refills: 0 | Status: DISCONTINUED | OUTPATIENT
Start: 2022-06-18 | End: 2022-07-02

## 2022-06-18 RX ORDER — PANTOPRAZOLE SODIUM 20 MG/1
40 TABLET, DELAYED RELEASE ORAL
Refills: 0 | Status: DISCONTINUED | OUTPATIENT
Start: 2022-06-18 | End: 2022-06-22

## 2022-06-18 RX ORDER — OXYCODONE AND ACETAMINOPHEN 5; 325 MG/1; MG/1
1 TABLET ORAL ONCE
Refills: 0 | Status: DISCONTINUED | OUTPATIENT
Start: 2022-06-18 | End: 2022-06-18

## 2022-06-18 RX ORDER — QUETIAPINE FUMARATE 200 MG/1
200 TABLET, FILM COATED ORAL
Refills: 0 | Status: DISCONTINUED | OUTPATIENT
Start: 2022-06-18 | End: 2022-07-02

## 2022-06-18 RX ORDER — ALBUTEROL 90 UG/1
2 AEROSOL, METERED ORAL EVERY 6 HOURS
Refills: 0 | Status: DISCONTINUED | OUTPATIENT
Start: 2022-06-18 | End: 2022-07-02

## 2022-06-18 RX ORDER — MORPHINE SULFATE 50 MG/1
2 CAPSULE, EXTENDED RELEASE ORAL EVERY 6 HOURS
Refills: 0 | Status: DISCONTINUED | OUTPATIENT
Start: 2022-06-18 | End: 2022-06-18

## 2022-06-18 RX ORDER — CHLORPROMAZINE HCL 10 MG
50 TABLET ORAL ONCE
Refills: 0 | Status: COMPLETED | OUTPATIENT
Start: 2022-06-18 | End: 2022-06-18

## 2022-06-18 RX ORDER — OXYCODONE HYDROCHLORIDE 5 MG/1
10 TABLET ORAL EVERY 4 HOURS
Refills: 0 | Status: DISCONTINUED | OUTPATIENT
Start: 2022-06-18 | End: 2022-06-25

## 2022-06-18 RX ORDER — DIPHENHYDRAMINE HCL 50 MG
25 CAPSULE ORAL ONCE
Refills: 0 | Status: COMPLETED | OUTPATIENT
Start: 2022-06-18 | End: 2022-06-18

## 2022-06-18 RX ORDER — ACETAMINOPHEN 500 MG
650 TABLET ORAL EVERY 6 HOURS
Refills: 0 | Status: DISCONTINUED | OUTPATIENT
Start: 2022-06-18 | End: 2022-07-02

## 2022-06-18 RX ORDER — CHLORPROMAZINE HCL 10 MG
50 TABLET ORAL
Refills: 0 | Status: DISCONTINUED | OUTPATIENT
Start: 2022-06-18 | End: 2022-06-18

## 2022-06-18 RX ORDER — ARIPIPRAZOLE 15 MG/1
30 TABLET ORAL DAILY
Refills: 0 | Status: DISCONTINUED | OUTPATIENT
Start: 2022-06-18 | End: 2022-07-02

## 2022-06-18 RX ORDER — HEPARIN SODIUM 5000 [USP'U]/ML
5000 INJECTION INTRAVENOUS; SUBCUTANEOUS EVERY 12 HOURS
Refills: 0 | Status: DISCONTINUED | OUTPATIENT
Start: 2022-06-18 | End: 2022-06-18

## 2022-06-18 RX ORDER — SODIUM CHLORIDE 9 MG/ML
1000 INJECTION, SOLUTION INTRAVENOUS
Refills: 0 | Status: DISCONTINUED | OUTPATIENT
Start: 2022-06-18 | End: 2022-06-18

## 2022-06-18 RX ORDER — VANCOMYCIN HCL 1 G
1250 VIAL (EA) INTRAVENOUS EVERY 12 HOURS
Refills: 0 | Status: DISCONTINUED | OUTPATIENT
Start: 2022-06-18 | End: 2022-06-18

## 2022-06-18 RX ORDER — ENOXAPARIN SODIUM 100 MG/ML
120 INJECTION SUBCUTANEOUS EVERY 12 HOURS
Refills: 0 | Status: COMPLETED | OUTPATIENT
Start: 2022-06-18 | End: 2022-06-19

## 2022-06-18 RX ORDER — LEVOTHYROXINE SODIUM 125 MCG
75 TABLET ORAL DAILY
Refills: 0 | Status: DISCONTINUED | OUTPATIENT
Start: 2022-06-18 | End: 2022-07-02

## 2022-06-18 RX ORDER — CHLORPROMAZINE HCL 10 MG
50 TABLET ORAL
Refills: 0 | Status: DISCONTINUED | OUTPATIENT
Start: 2022-06-18 | End: 2022-06-20

## 2022-06-18 RX ADMIN — SODIUM CHLORIDE 1000 MILLILITER(S): 9 INJECTION INTRAMUSCULAR; INTRAVENOUS; SUBCUTANEOUS at 00:00

## 2022-06-18 RX ADMIN — ARIPIPRAZOLE 30 MILLIGRAM(S): 15 TABLET ORAL at 14:16

## 2022-06-18 RX ADMIN — Medication 50 MILLIGRAM(S): at 23:01

## 2022-06-18 RX ADMIN — MORPHINE SULFATE 2 MILLIGRAM(S): 50 CAPSULE, EXTENDED RELEASE ORAL at 03:34

## 2022-06-18 RX ADMIN — Medication 50 MILLIGRAM(S): at 20:51

## 2022-06-18 RX ADMIN — SODIUM CHLORIDE 150 MILLILITER(S): 9 INJECTION, SOLUTION INTRAVENOUS at 03:35

## 2022-06-18 RX ADMIN — QUETIAPINE FUMARATE 200 MILLIGRAM(S): 200 TABLET, FILM COATED ORAL at 17:36

## 2022-06-18 RX ADMIN — POLYETHYLENE GLYCOL 3350 17 GRAM(S): 17 POWDER, FOR SOLUTION ORAL at 14:17

## 2022-06-18 RX ADMIN — DIVALPROEX SODIUM 500 MILLIGRAM(S): 500 TABLET, DELAYED RELEASE ORAL at 14:17

## 2022-06-18 RX ADMIN — OXYCODONE HYDROCHLORIDE 10 MILLIGRAM(S): 5 TABLET ORAL at 23:01

## 2022-06-18 RX ADMIN — MORPHINE SULFATE 2 MILLIGRAM(S): 50 CAPSULE, EXTENDED RELEASE ORAL at 14:16

## 2022-06-18 RX ADMIN — MORPHINE SULFATE 2 MILLIGRAM(S): 50 CAPSULE, EXTENDED RELEASE ORAL at 09:38

## 2022-06-18 RX ADMIN — Medication 100 MILLIGRAM(S): at 09:37

## 2022-06-18 RX ADMIN — Medication 100 MILLIGRAM(S): at 02:15

## 2022-06-18 RX ADMIN — ENOXAPARIN SODIUM 120 MILLIGRAM(S): 100 INJECTION SUBCUTANEOUS at 17:37

## 2022-06-18 RX ADMIN — MORPHINE SULFATE 2 MILLIGRAM(S): 50 CAPSULE, EXTENDED RELEASE ORAL at 14:31

## 2022-06-18 RX ADMIN — OXYCODONE HYDROCHLORIDE 10 MILLIGRAM(S): 5 TABLET ORAL at 18:04

## 2022-06-18 RX ADMIN — Medication 75 MICROGRAM(S): at 05:38

## 2022-06-18 RX ADMIN — OXYCODONE AND ACETAMINOPHEN 1 TABLET(S): 5; 325 TABLET ORAL at 09:38

## 2022-06-18 RX ADMIN — Medication 0.5 MILLIGRAM(S): at 05:38

## 2022-06-18 RX ADMIN — Medication 100 MILLIGRAM(S): at 14:25

## 2022-06-18 RX ADMIN — Medication 100 MILLIGRAM(S): at 22:29

## 2022-06-18 RX ADMIN — Medication 25 MILLIGRAM(S): at 04:37

## 2022-06-18 RX ADMIN — OXYCODONE HYDROCHLORIDE 10 MILLIGRAM(S): 5 TABLET ORAL at 17:04

## 2022-06-18 RX ADMIN — QUETIAPINE FUMARATE 200 MILLIGRAM(S): 200 TABLET, FILM COATED ORAL at 05:38

## 2022-06-18 RX ADMIN — DIVALPROEX SODIUM 1000 MILLIGRAM(S): 500 TABLET, DELAYED RELEASE ORAL at 23:00

## 2022-06-18 RX ADMIN — PANTOPRAZOLE SODIUM 40 MILLIGRAM(S): 20 TABLET, DELAYED RELEASE ORAL at 09:38

## 2022-06-18 RX ADMIN — Medication 50 MILLIGRAM(S): at 16:50

## 2022-06-18 RX ADMIN — Medication 0.5 MILLIGRAM(S): at 16:19

## 2022-06-18 RX ADMIN — Medication 1 TABLET(S): at 14:16

## 2022-06-18 RX ADMIN — Medication 3 MILLIGRAM(S): at 22:29

## 2022-06-18 RX ADMIN — Medication 250 MILLIGRAM(S): at 00:30

## 2022-06-18 RX ADMIN — Medication 50 MILLIGRAM(S): at 14:50

## 2022-06-18 RX ADMIN — Medication 25 MILLIGRAM(S): at 03:55

## 2022-06-18 NOTE — H&P ADULT - NSHPREVIEWOFSYSTEMS_GEN_ALL_CORE
REVIEW OF SYSTEMS:    CONSTITUTIONAL: No weakness, fevers, + chills  EYES/ENT: No visual changes;  No dysphagia; No sore throat; No rhinorrhea; No sinus pain/pressure  NECK: No pain or stiffness  RESPIRATORY: No cough, wheezing, hemoptysis; No shortness of breath  CARDIOVASCULAR: No chest pain or palpitations; No lower extremity edema  GASTROINTESTINAL: No abdominal or epigastric pain. No nausea, vomiting, or hematemesis; No diarrhea or constipation. No melena or hematochezia.  GENITOURINARY: No dysuria, frequency or hematuria  NEUROLOGICAL: No numbness or weakness  MSK: ambulates without assistance, + L shoulder pain  SKIN: + erythema on L shoulder  All other review of systems is negative unless indicated above.

## 2022-06-18 NOTE — H&P ADULT - NSHPPHYSICALEXAM_GEN_ALL_CORE
PHYSICAL EXAM:  VITALS: Vital Signs Last 24 Hrs  T(C): 37.1 (17 Jun 2022 23:30), Max: 37.1 (17 Jun 2022 23:30)  T(F): 98.8 (17 Jun 2022 23:30), Max: 98.8 (17 Jun 2022 23:30)  HR: 108 (17 Jun 2022 23:30) (108 - 118)  BP: 110/70 (17 Jun 2022 23:30) (110/70 - 132/86)  BP(mean): --  RR: 18 (17 Jun 2022 23:30) (16 - 18)  SpO2: 100% (17 Jun 2022 23:30) (100% - 100%)  GENERAL: NAD, well-developed, well-nourished, + lethargic appearing  HEAD:  Atraumatic, Normocephalic  EYES: EOMI, PERRL, conjunctiva and sclera clear  ENT: Moist Mucus Membranes present, no ulcers appreciated  NECK: Supple, No JVD  CHEST/LUNG: Clear to auscultation bilaterally; No wheezes, rales or rhonchi, no accessory muscle use  HEART: Regular rate and rhythm; No murmurs, rubs, or gallops, (+)S1, S2  ABDOMEN: Soft, Nontender, Nondistended; Normal Bowel sounds   EXTREMITIES:  2+ Peripheral Pulses, No clubbing, cyanosis, or edema, + L shoulder swelling, erythema, pain on palpation, pain with ROM, fluctuance on deltoid area, pain extending down to elbow  PSYCH: normal mood and affect  NEUROLOGY: AAOx3, non-focal  SKIN: No rashes or lesions
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
## 2022-06-18 NOTE — CONSULT NOTE ADULT - SUBJECTIVE AND OBJECTIVE BOX
General Surgery Consult  Consulting surgical team: B TEAM SURGERY  Consulting attending: Dr. Hong    HPI:  36F hx of bipolar disorder and factor V leiden on eliquis (last dose 6/17 AM) recently admitted with RUE intramuscular abscess s/p IR drainage after an IM injection, received L deltoid IM injection at that time now here with 2 days of worsening L upper arm pain found to have 5cm intramuscular abscess with tiny droplets of air  surgery consulted to r/o NSTI    no crepitus on exam, L lateral upper arm induration/erythema/tenderness and L medial distal upper arm tender soft  L radial 2+, decreased sensation LUE distal to area of induration, intact motor grossly aside from L upper arm abduction limited 2/2 pain  LUE edema    PAST MEDICAL HISTORY:  Asthma    Seizure    Factor V Leiden    Bipolar disorder    Endometriosis    History of DVT in adulthood    Seizures    Hypothyroid    Seasonal allergies    Insomnia    GERD (gastroesophageal reflux disease)        PAST SURGICAL HISTORY:  No significant past surgical history        MEDICATIONS:  acetaminophen     Tablet .. 650 milliGRAM(s) Oral every 6 hours PRN  ALBUTerol    90 MICROgram(s) HFA Inhaler 2 Puff(s) Inhalation every 6 hours PRN  ARIPiprazole 30 milliGRAM(s) Oral daily  clindamycin IVPB 600 milliGRAM(s) IV Intermittent every 8 hours  diVALproex  milliGRAM(s) Oral daily  diVALproex ER 1000 milliGRAM(s) Oral at bedtime  heparin   Injectable 5000 Unit(s) SubCutaneous every 12 hours  lactated ringers. 1000 milliLiter(s) IV Continuous <Continuous>  levothyroxine 75 MICROGram(s) Oral daily  LORazepam     Tablet 0.5 milliGRAM(s) Oral two times a day  melatonin 3 milliGRAM(s) Oral at bedtime PRN  morphine  - Injectable 2 milliGRAM(s) IV Push every 6 hours PRN  multivitamin 1 Tablet(s) Oral daily  pantoprazole    Tablet 40 milliGRAM(s) Oral before breakfast  polyethylene glycol 3350 Oral Powder - Peds 17 Gram(s) Oral daily  QUEtiapine 200 milliGRAM(s) Oral two times a day  vancomycin  IVPB 1250 milliGRAM(s) IV Intermittent every 12 hours      ALLERGIES:  latex (Blisters)  penicillin (Hives)  penicillin (Other)  pineapple (Unknown)  Toradol (Rash)  Toradol (Unknown)  Zofran (Hives)  Zofran (Unknown)      VITALS & I/Os:  Vital Signs Last 24 Hrs  T(C): 37.1 (17 Jun 2022 23:30), Max: 37.1 (17 Jun 2022 23:30)  T(F): 98.8 (17 Jun 2022 23:30), Max: 98.8 (17 Jun 2022 23:30)  HR: 108 (17 Jun 2022 23:30) (108 - 118)  BP: 110/70 (17 Jun 2022 23:30) (110/70 - 132/86)  BP(mean): --  RR: 18 (17 Jun 2022 23:30) (16 - 18)  SpO2: 100% (17 Jun 2022 23:30) (100% - 100%)    I&O's Summary      PHYSICAL EXAM:  General: No acute distress  Respiratory: Nonlabored  Cardiovascular: RRR  Abdominal: Soft, nondistended, nontender  Extremities: Warm, LUE edema  decreased sensation L forearm and L hand compared to RUE  motor grossly intact in BUE except unable to abduct L upper arm 2/2 pain  L lateral upper arm 6cm area of induration warmth tenderness and erythema at area of prior IM injections, also tender L medial distal upper arm  RUE upper lateral arm prior IM injection site c/d/i with no signs of active infection  L radial 2+    LABS:                        10.6   7.53  )-----------( 136      ( 17 Jun 2022 21:52 )             33.8     06-17    141  |  106  |  10  ----------------------------<  82  4.7   |  23  |  0.89    Ca    9.3      17 Jun 2022 21:52    TPro  6.8  /  Alb  3.8  /  TBili  0.2  /  DBili  x   /  AST  15  /  ALT  19  /  AlkPhos  44  06-17    Lactate:  06-17 @ 21:52  1.6        CARDIAC MARKERS ( 17 Jun 2022 21:52 )  x     / x     / 55 U/L / x     / x                IMAGING:    ACC: [id number] EXAM:  CT UPR EXT IC LT                          PROCEDURE DATE:  06/17/2022          INTERPRETATION:  INDICATION: Increasing left upper extremity pain status   post intramuscular injection, concern for abscess.    TECHNIQUE: Contrast-enhanced CT of the left shoulder and humerus was   performed. Axial images were acquired with coronal and sagittal   reformats. 100 mL of Omnipaque 350 were administered. 5 mL discarded.    COMPARISON: None.    FINDINGS:  Closely associated with the lateral aspect of the deltoid musculature,   there is a 2.3 x 1.6 x 5.1 cm peripherally enhancing, walled off   collection consistent with an abscess. Extensive phlegmon and   inflammatory changes surround the collection with overlying subcutaneous   edema and mild skin thickening.    A couple punctate foci of gas are seen in the subcutaneous tissues along   the inferior margin of the collection.    A few prominent left axillary nodes are noted, for instance a 1.5 x 1.0   cm left axillary lymph node (3:56). These are likely reactive to the   above infectious process.    There is no cortical erosion or periosteal reaction to suggest   osteomyelitis.    The humerus and other visualized unremarkable. Acromioclavicular and   glenohumeral joint spaces are maintained. Joint space of the elbow are   maintained.    IMPRESSION:  Left upper extremity abscess closely associated with the deltoid   musculature.    A couple foci of subcutaneous gas along the inferior margin of the   collection are most likely related to recent upper extremity injection.   Close clinical follow-up is recommended to exclude a necrotizing soft   tissue infection.    --- End of Report ---    BRYAN NERI MD; Resident Radiologist  This document has been electronically signed.  ISAÍAS GREGORY MD; Attending Radiologist  This document has been electronically signed. Jun 18 2022 12:01AM

## 2022-06-18 NOTE — PROGRESS NOTE ADULT - PROBLEM SELECTOR PLAN 1
Assessment:  - patient presented with abscess of the L shoulder secondary to IM injection  - no leukocytosis however is tachycardic and mildly low BP  - previously had R shoulder abscess in the past  - CT L shoulder show Left upper extremity abscess closely associated with the deltoid musculature. A couple foci of subcutaneous gas along the inferior margin of the collection are most likely related to recent upper extremity injection.     Plan:  - surgery consult for evaluation of nec fascitis vs. compartment syndrome and evaluation if surgical debridement is indicated  - IR consult if surgery recommends it  - start vancomycin and clinda for abscess  - f/u blood cultures  - will obtain CK level Assessment:  - patient presented with abscess of the L shoulder secondary to IM injection  - no leukocytosis however is tachycardic and mildly low BP  - previously had R shoulder abscess in the past  - CT L shoulder show Left upper extremity abscess closely associated with the deltoid musculature. A couple foci of subcutaneous gas along the inferior margin of the collection are most likely related to recent upper extremity injection.   - surgery consulted, no indication for intervention, negative for nec fasc   - IR consulted for I/D, will obtain cultures   - D/c vancomycin, continue on clindamycin 600 TID IV

## 2022-06-18 NOTE — H&P ADULT - ASSESSMENT
35 y/o F with pmhx of bipolar disorder, hypothyroidism, factor V Leiden on eliquis, seizures, presented to the ED for new onset L shoulder pain. The patient found to have a L deltoid abscess likely secondary to IM injections. Admit for abscess.

## 2022-06-18 NOTE — PROGRESS NOTE ADULT - PROBLEM SELECTOR PLAN 4
Assessment:  - patient has hx of bipolar disorder and is currently on multiple antipsychotics: High dose seroquel, abilify, chlorpromazine ,lurasidone, tizanidine  - QTc 464 ms, already prolonged  - previous psych consult recommended continuation of all antipsychotics    Plan:  - will hold lurasidone and chlorpromazine, tizanidine for now, concern of multiple antipsychotics causing prolonged QTc  - tele monitoring  - recommend psych consult in the AM to review medications, and restart the other medications as needed  - will get a repeat EKG  - patient does not appear agitated at this time  - monitor for NMS in the setting of high dose antipsychotic use  - c/w abilify, seroquel (BID dosing only) Assessment:  - patient has hx of bipolar disorder and is currently on multiple antipsychotics: High dose seroquel, abilify, chlorpromazine ,lurasidone, tizanidine  - QTc 464 ms, already prolonged  - previous psych consult recommended continuation of all antipsychotics    Plan:  - will hold lurasidone and chlorpromazine, tizanidine for now, concern of multiple antipsychotics causing prolonged QTc  - c/w abilify, seroquel (BID dosing only)  - Psych consulted for medication management/ polypharmacy

## 2022-06-18 NOTE — ED ADULT NURSE REASSESSMENT NOTE - NS ED NURSE REASSESS COMMENT FT1
Patient c/o having panic attacks. Also states she normally take 50mg pf benadryl with morphine. Also requesting her home meds.  ACP team made aware  . Given 25mg of Benadryl  and reassured the patient.  Await MD to review the patient.

## 2022-06-18 NOTE — BH CONSULTATION LIAISON ASSESSMENT NOTE - DETAILS
patient reports one month ago she was at [facility] s/p , took Tylenol and Ativan-no inpatient admission (not verified)

## 2022-06-18 NOTE — PROGRESS NOTE ADULT - PROBLEM SELECTOR PLAN 7
- hold eliquis for now pending IR vs. surgical intervention, would resume once abscess is treated - Hx of RLE DVT, on Eliquis 5mg BID outpatient   - Will start lovenox pending IR I/D

## 2022-06-18 NOTE — BH CONSULTATION LIAISON ASSESSMENT NOTE - RISK ASSESSMENT
Risk Factors: acute medical condition, intellectual disability, hx bipolar d/o, schizoaffective d/o, hx impulsivity, hx multiple psy inpatient admissions, hx sa, aggression towards others  Protective Factors: residential stability, supportive family, positive therapeutic relationship, denies si/sa/sib/hi, denies ah/vh, medication compliant, denies access to firearms

## 2022-06-18 NOTE — BH CONSULTATION LIAISON ASSESSMENT NOTE - CASE SUMMARY
Pt seen virtually via NP. Appears to be somnolent, though not agitated/combative. Understands she has infections from various IM medications. See above recs/assessment, with which I agree. Dispo planning pending, though its unlikely she will need inpatient psych. Will continue to follow.

## 2022-06-18 NOTE — H&P ADULT - PROBLEM SELECTOR PLAN 1
Assessment:  - patient presented with abscess of the L shoulder secondary to IM injection  - no leukocytosis however is tachycardic and mildly low BP  - previously had R shoulder abscess in the past  - CT L shoulder show Left upper extremity abscess closely associated with the deltoid musculature. A couple foci of subcutaneous gas along the inferior margin of the collection are most likely related to recent upper extremity injection.     Plan:  - surgery consult for evaluation of nec fascitis vs. compartment syndrome and evaluation if surgical debridement is indicated  - IR consult if surgery recommends it  - start vancomycin and clinda for abscess  - f/u blood cultures  - will obtain CK level

## 2022-06-18 NOTE — PROGRESS NOTE ADULT - SUBJECTIVE AND OBJECTIVE BOX
Authored By:  Rony Walton MD  PGY-1, Internal Medicine    INTERVAL HPI/OVERNIGHT EVENTS:     SUBJECTIVE: Patient seen and examined at bedside this morning.     OBJECTIVE:      CAPILLARY BLOOD GLUCOSE          ICU Vital Signs Last 24 Hrs  T(C): 36.7 (18 Jun 2022 06:21), Max: 37.1 (17 Jun 2022 23:30)  T(F): 98.1 (18 Jun 2022 06:21), Max: 98.8 (17 Jun 2022 23:30)  HR: 70 (18 Jun 2022 06:21) (70 - 118)  BP: 130/91 (18 Jun 2022 06:21) (110/70 - 132/86)  BP(mean): --  ABP: --  ABP(mean): --  RR: 16 (18 Jun 2022 06:21) (16 - 18)  SpO2: 100% (18 Jun 2022 06:21) (100% - 100%)    PHYSICAL EXAM:      MEDICATIONS:  MEDICATIONS  (STANDING):  ARIPiprazole 30 milliGRAM(s) Oral daily  clindamycin IVPB 600 milliGRAM(s) IV Intermittent every 8 hours  diVALproex  milliGRAM(s) Oral daily  diVALproex ER 1000 milliGRAM(s) Oral at bedtime  heparin   Injectable 5000 Unit(s) SubCutaneous every 12 hours  lactated ringers. 1000 milliLiter(s) (150 mL/Hr) IV Continuous <Continuous>  levothyroxine 75 MICROGram(s) Oral daily  LORazepam     Tablet 0.5 milliGRAM(s) Oral two times a day  multivitamin 1 Tablet(s) Oral daily  pantoprazole    Tablet 40 milliGRAM(s) Oral before breakfast  polyethylene glycol 3350 Oral Powder - Peds 17 Gram(s) Oral daily  QUEtiapine 200 milliGRAM(s) Oral two times a day  vancomycin  IVPB 1250 milliGRAM(s) IV Intermittent every 12 hours    MEDICATIONS  (PRN):  acetaminophen     Tablet .. 650 milliGRAM(s) Oral every 6 hours PRN Temp greater or equal to 38C (100.4F), Mild Pain (1 - 3)  ALBUTerol    90 MICROgram(s) HFA Inhaler 2 Puff(s) Inhalation every 6 hours PRN Shortness of Breath and/or Wheezing  melatonin 3 milliGRAM(s) Oral at bedtime PRN Insomnia  morphine  - Injectable 2 milliGRAM(s) IV Push every 6 hours PRN Severe Pain (7 - 10)      ALLERGIES:  Allergies    latex (Blisters)  penicillin (Hives)  penicillin (Other)  pineapple (Unknown)  Toradol (Rash)  Toradol (Unknown)  Zofran (Hives)  Zofran (Unknown)    Intolerances        LABS:                        10.6   7.53  )-----------( 136      ( 17 Jun 2022 21:52 )             33.8     Hemoglobin: 10.6 g/dL (06-17 @ 21:52)  Hemoglobin: 10.6 g/dL (06-14 @ 07:02)    CBC Full  -  ( 17 Jun 2022 21:52 )  WBC Count : 7.53 K/uL  RBC Count : 3.53 M/uL  Hemoglobin : 10.6 g/dL  Hematocrit : 33.8 %  Platelet Count - Automated : 136 K/uL  Mean Cell Volume : 95.8 fL  Mean Cell Hemoglobin : 30.0 pg  Mean Cell Hemoglobin Concentration : 31.4 gm/dL  Auto Neutrophil # : 4.60 K/uL  Auto Lymphocyte # : 2.10 K/uL  Auto Monocyte # : 0.76 K/uL  Auto Eosinophil # : 0.03 K/uL  Auto Basophil # : 0.02 K/uL  Auto Neutrophil % : 61.0 %  Auto Lymphocyte % : 27.9 %  Auto Monocyte % : 10.1 %  Auto Eosinophil % : 0.4 %  Auto Basophil % : 0.3 %    06-17    141  |  106  |  10  ----------------------------<  82  4.7   |  23  |  0.89    Ca    9.3      17 Jun 2022 21:52    TPro  6.8  /  Alb  3.8  /  TBili  0.2  /  DBili  x   /  AST  15  /  ALT  19  /  AlkPhos  44  06-17    Creatinine Trend: 0.89<--, 0.70<--, 0.72<--, 0.70<--, 0.80<--, 0.78<--  LIVER FUNCTIONS - ( 17 Jun 2022 21:52 )  Alb: 3.8 g/dL / Pro: 6.8 g/dL / ALK PHOS: 44 U/L / ALT: 19 U/L / AST: 15 U/L / GGT: x               hs Troponin:        21:52 - VBG - pH: 7.32  | pCO2: 56    | pO2: 29    | Lactate: 1.6          CSF:                      EKG:   MICROBIOLOGY:    Culture - Acid Fast - Body Fluid w/Smear (collected 16 Jun 2022 12:53)  Source: .Body Fluid Interstitial Fluid      IMAGING:      Labs, imaging, EKG personally reviewed    RADIOLOGY & ADDITIONAL TESTS: Reviewed. Authored By:  Rony Walton MD  PGY-1, Internal Medicine    INTERVAL HPI/OVERNIGHT EVENTS:     SUBJECTIVE: Patient seen and examined at bedside this morning. Patient endorsing L arm pain. ROS negative otherwise.     OBJECTIVE:        Vital Signs Last 24 Hrs  T(C): 36.7 (18 Jun 2022 06:21), Max: 37.1 (17 Jun 2022 23:30)  T(F): 98.1 (18 Jun 2022 06:21), Max: 98.8 (17 Jun 2022 23:30)  HR: 70 (18 Jun 2022 06:21) (70 - 118)  BP: 130/91 (18 Jun 2022 06:21) (110/70 - 132/86)  BP(mean): --  ABP: --  ABP(mean): --  RR: 16 (18 Jun 2022 06:21) (16 - 18)  SpO2: 100% (18 Jun 2022 06:21) (100% - 100%)    PHYSICAL EXAM:  Gen: AAOx3, non-toxic  Head: NCAT  HEENT: EOMI, oral mucosa moist, normal conjunctiva  Lung: CTAB, no respiratory distress, no wheezes/rhonchi/rales B/L, speaking in full sentences  CV: RRR, no murmurs, rubs or gallops  Abd: soft, NTND, no guarding, no CVA tenderness  MSK: decreased ROM on L shoulder, fluctuance/erythema on L deltoid   Neuro: No focal sensory or motor deficits, normal CN exam   Skin: Warm, well perfused, no rash, erythema on L deltoid   Psych: normal affect.      MEDICATIONS:  MEDICATIONS  (STANDING):  ARIPiprazole 30 milliGRAM(s) Oral daily  clindamycin IVPB 600 milliGRAM(s) IV Intermittent every 8 hours  diVALproex  milliGRAM(s) Oral daily  diVALproex ER 1000 milliGRAM(s) Oral at bedtime  heparin   Injectable 5000 Unit(s) SubCutaneous every 12 hours  lactated ringers. 1000 milliLiter(s) (150 mL/Hr) IV Continuous <Continuous>  levothyroxine 75 MICROGram(s) Oral daily  LORazepam     Tablet 0.5 milliGRAM(s) Oral two times a day  multivitamin 1 Tablet(s) Oral daily  pantoprazole    Tablet 40 milliGRAM(s) Oral before breakfast  polyethylene glycol 3350 Oral Powder - Peds 17 Gram(s) Oral daily  QUEtiapine 200 milliGRAM(s) Oral two times a day  vancomycin  IVPB 1250 milliGRAM(s) IV Intermittent every 12 hours    MEDICATIONS  (PRN):  acetaminophen     Tablet .. 650 milliGRAM(s) Oral every 6 hours PRN Temp greater or equal to 38C (100.4F), Mild Pain (1 - 3)  ALBUTerol    90 MICROgram(s) HFA Inhaler 2 Puff(s) Inhalation every 6 hours PRN Shortness of Breath and/or Wheezing  melatonin 3 milliGRAM(s) Oral at bedtime PRN Insomnia  morphine  - Injectable 2 milliGRAM(s) IV Push every 6 hours PRN Severe Pain (7 - 10)      ALLERGIES:  Allergies    latex (Blisters)  penicillin (Hives)  penicillin (Other)  pineapple (Unknown)  Toradol (Rash)  Toradol (Unknown)  Zofran (Hives)  Zofran (Unknown)    Intolerances        LABS:                        10.6   7.53  )-----------( 136      ( 17 Jun 2022 21:52 )             33.8     Hemoglobin: 10.6 g/dL (06-17 @ 21:52)  Hemoglobin: 10.6 g/dL (06-14 @ 07:02)    CBC Full  -  ( 17 Jun 2022 21:52 )  WBC Count : 7.53 K/uL  RBC Count : 3.53 M/uL  Hemoglobin : 10.6 g/dL  Hematocrit : 33.8 %  Platelet Count - Automated : 136 K/uL  Mean Cell Volume : 95.8 fL  Mean Cell Hemoglobin : 30.0 pg  Mean Cell Hemoglobin Concentration : 31.4 gm/dL  Auto Neutrophil # : 4.60 K/uL  Auto Lymphocyte # : 2.10 K/uL  Auto Monocyte # : 0.76 K/uL  Auto Eosinophil # : 0.03 K/uL  Auto Basophil # : 0.02 K/uL  Auto Neutrophil % : 61.0 %  Auto Lymphocyte % : 27.9 %  Auto Monocyte % : 10.1 %  Auto Eosinophil % : 0.4 %  Auto Basophil % : 0.3 %    06-17    141  |  106  |  10  ----------------------------<  82  4.7   |  23  |  0.89    Ca    9.3      17 Jun 2022 21:52    TPro  6.8  /  Alb  3.8  /  TBili  0.2  /  DBili  x   /  AST  15  /  ALT  19  /  AlkPhos  44  06-17    Creatinine Trend: 0.89<--, 0.70<--, 0.72<--, 0.70<--, 0.80<--, 0.78<--  LIVER FUNCTIONS - ( 17 Jun 2022 21:52 )  Alb: 3.8 g/dL / Pro: 6.8 g/dL / ALK PHOS: 44 U/L / ALT: 19 U/L / AST: 15 U/L / GGT: x             21:52 - VBG - pH: 7.32  | pCO2: 56    | pO2: 29    | Lactate: 1.6          EKG:   MICROBIOLOGY:    Culture - Acid Fast - Body Fluid w/Smear (collected 16 Jun 2022 12:53)  Source: .Body Fluid Interstitial Fluid      IMAGING:      Labs, imaging, EKG personally reviewed    RADIOLOGY & ADDITIONAL TESTS: Reviewed.

## 2022-06-18 NOTE — H&P ADULT - NSHPLABSRESULTS_GEN_ALL_CORE
10.6   7.53  )-----------( 136      ( 17 Jun 2022 21:52 )             33.8       06-17    141  |  106  |  10  ----------------------------<  82  4.7   |  23  |  0.89    Ca    9.3      17 Jun 2022 21:52    TPro  6.8  /  Alb  3.8  /  TBili  0.2  /  DBili  x   /  AST  15  /  ALT  19  /  AlkPhos  44  06-17      CARDIAC MARKERS ( 17 Jun 2022 21:52 )  x     / x     / 55 U/L / x     / x                21:52 - VBG - pH: 7.32  | pCO2: 56    | pO2: 29    | Lactate: 1.6              Culture - Acid Fast - Body Fluid w/Smear (collected 16 Jun 2022 12:53)  Source: .Body Fluid Interstitial Fluid
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
## 2022-06-18 NOTE — BH CONSULTATION LIAISON ASSESSMENT NOTE - NSBHCHARTREVIEWVS_PSY_A_CORE FT
Vital Signs Last 24 Hrs  T(C): 36.6 (18 Jun 2022 14:15), Max: 37.1 (17 Jun 2022 23:30)  T(F): 97.9 (18 Jun 2022 14:15), Max: 98.8 (17 Jun 2022 23:30)  HR: 84 (18 Jun 2022 14:15) (70 - 118)  BP: 111/77 (18 Jun 2022 14:15) (108/76 - 132/86)  BP(mean): --  RR: 16 (18 Jun 2022 14:15) (16 - 18)  SpO2: 100% (18 Jun 2022 14:15) (100% - 100%)

## 2022-06-18 NOTE — BH CONSULTATION LIAISON ASSESSMENT NOTE - CURRENT MEDICATION
MEDICATIONS  (STANDING):  ARIPiprazole 30 milliGRAM(s) Oral daily  clindamycin IVPB 600 milliGRAM(s) IV Intermittent every 8 hours  diVALproex  milliGRAM(s) Oral daily  diVALproex ER 1000 milliGRAM(s) Oral at bedtime  heparin   Injectable 5000 Unit(s) SubCutaneous every 12 hours  levothyroxine 75 MICROGram(s) Oral daily  LORazepam     Tablet 0.5 milliGRAM(s) Oral two times a day  multivitamin 1 Tablet(s) Oral daily  pantoprazole    Tablet 40 milliGRAM(s) Oral before breakfast  polyethylene glycol 3350 Oral Powder - Peds 17 Gram(s) Oral daily  QUEtiapine 200 milliGRAM(s) Oral two times a day    MEDICATIONS  (PRN):  acetaminophen     Tablet .. 650 milliGRAM(s) Oral every 6 hours PRN Temp greater or equal to 38C (100.4F), Mild Pain (1 - 3)  ALBUTerol    90 MICROgram(s) HFA Inhaler 2 Puff(s) Inhalation every 6 hours PRN Shortness of Breath and/or Wheezing  diphenhydrAMINE 50 milliGRAM(s) Oral every 8 hours PRN Rash and/or Itching  melatonin 3 milliGRAM(s) Oral at bedtime PRN Insomnia  morphine  - Injectable 2 milliGRAM(s) IV Push every 6 hours PRN Severe Pain (7 - 10)  oxyCODONE    IR 10 milliGRAM(s) Oral every 4 hours PRN Severe Pain (7 - 10)  oxyCODONE    IR 5 milliGRAM(s) Oral every 4 hours PRN Moderate Pain (4 - 6)

## 2022-06-18 NOTE — H&P ADULT - PROBLEM SELECTOR PLAN 3
Assessment:  - patient unknown timeframe of LE swelling b/l    Plan:  - will get dopplers of LE to evaluate for DVT in the setting of factor V leiden

## 2022-06-18 NOTE — BH CONSULTATION LIAISON ASSESSMENT NOTE - NSBHCHARTREVIEWLAB_PSY_A_CORE FT
CBC Full  -  ( 17 Jun 2022 21:52 )  WBC Count : 7.53 K/uL  RBC Count : 3.53 M/uL  Hemoglobin : 10.6 g/dL  Hematocrit : 33.8 %  Platelet Count - Automated : 136 K/uL  Mean Cell Volume : 95.8 fL  Mean Cell Hemoglobin : 30.0 pg  Mean Cell Hemoglobin Concentration : 31.4 gm/dL  Auto Neutrophil # : 4.60 K/uL  Auto Lymphocyte # : 2.10 K/uL  Auto Monocyte # : 0.76 K/uL  Auto Eosinophil # : 0.03 K/uL  Auto Basophil # : 0.02 K/uL  Auto Neutrophil % : 61.0 %  Auto Lymphocyte % : 27.9 %  Auto Monocyte % : 10.1 %  Auto Eosinophil % : 0.4 %  Auto Basophil % : 0.3 %  06-17    141  |  106  |  10  ----------------------------<  82  4.7   |  23  |  0.89    Ca    9.3      17 Jun 2022 21:52  Phos  4.6     06-17  Mg     1.70     06-17    TPro  6.8  /  Alb  3.8  /  TBili  0.2  /  DBili  x   /  AST  15  /  ALT  19  /  AlkPhos  44  06-17

## 2022-06-18 NOTE — ED ADULT NURSE REASSESSMENT NOTE - NS ED NURSE REASSESS COMMENT FT1
Patient had morphine for the pain and soon after the injection patient c/o itching and redness at the cannula site. Spoke to MD Hendrix. Gave 25mg Benadryl.

## 2022-06-18 NOTE — CONSULT NOTE ADULT - SUBJECTIVE AND OBJECTIVE BOX
Vascular & Interventional Radiology    HPI: 36y Female with pmhx of bipolar disorder, hypothyroidism, factor V Leiden on eliquis, seizures, presented to the ED for new onset L shoulder pain. The patient was previously admitted at [facility] for R shoulder pain after an IM injection. Work up showed a R sided abscess. She had I and D and IR drainage of the abscess collection. 2 days ago while she was still admitted, she also had received an IM injection on the L arm during that admission for anxiety. She was then discharged with doxycycline. The patient now returns with new onset L sided shoulder pain and swelling started today. The patient also appears lethargic appearing and having chills. The patient endorsed pain on the L deltoid area where she got her IM injection with mild erythema. Pain also tracks down medially down to the elbow in the medial epicondyle with mild swelling. Denies chest pain, SOB, abdominal pain, dysuria    CT w/ Left upper extremity abscess closely associated with the deltoid musculature.    Allergies: latex (Blisters)  penicillin (Hives)  penicillin (Other)  pineapple (Unknown)  Toradol (Rash)  Toradol (Unknown)  Zofran (Hives)  Zofran (Unknown)    Data:  T(C): 36.8  HR: 87  BP: 108/76  RR: 16  SpO2: 100%    -WBC 7.53 / HgB 10.6 / Hct 33.8 / Plt 136  -Na 141 / Cl 106 / BUN 10 / Glucose 82  -K 4.7 / CO2 23 / Cr 0.89  -ALT 19 / Alk Phos 44 / T.Bili 0.2    Imaging: reviewed and as in HPI.    Assessment:   36y Female w/ L deltoid collection.    Plan:   -  Vascular & Interventional Radiology    HPI: 36y Female with pmhx of bipolar disorder, hypothyroidism, factor V Leiden on eliquis, seizures, presented to the ED for new onset L shoulder pain. The patient was previously admitted at [facility] for R shoulder pain after an IM injection. Work up showed a R sided abscess. She had I and D and IR drainage of the abscess collection. 2 days ago while she was still admitted, she also had received an IM injection on the L arm during that admission for anxiety. She was then discharged with doxycycline. The patient now returns with new onset L sided shoulder pain and swelling started today. The patient also appears lethargic appearing and having chills. The patient endorsed pain on the L deltoid area where she got her IM injection with mild erythema. Pain also tracks down medially down to the elbow in the medial epicondyle with mild swelling. Denies chest pain, SOB, abdominal pain, dysuria    CT w/ Left upper extremity abscess closely associated with the deltoid musculature.    Allergies: latex (Blisters)  penicillin (Hives)  penicillin (Other)  pineapple (Unknown)  Toradol (Rash)  Toradol (Unknown)  Zofran (Hives)  Zofran (Unknown)    Data:  T(C): 36.8  HR: 87  BP: 108/76  RR: 16  SpO2: 100%    -WBC 7.53 / HgB 10.6 / Hct 33.8 / Plt 136  -Na 141 / Cl 106 / BUN 10 / Glucose 82  -K 4.7 / CO2 23 / Cr 0.89  -ALT 19 / Alk Phos 44 / T.Bili 0.2    Imaging: reviewed and as in HPI.    Assessment:   36y Female w/ L deltoid collection.    Plan:   - F/u final culture from right deltoid aspiration at OSH.  - Blood cultures pending.  - If these are negative, can perform aspiration Monday or Tuesday depending on schedule availability.  - Local only, does not have to be NPO.

## 2022-06-18 NOTE — PATIENT PROFILE ADULT - FALL HARM RISK - HARM RISK INTERVENTIONS
Assistance OOB with selected safe patient handling equipment/Communicate Risk of Fall with Harm to all staff/Reinforce activity limits and safety measures with patient and family/Review medications for side effects contributing to fall risk/Sit up slowly, dangle for a short time, stand at bedside before walking/Tailored Fall Risk Interventions/Toileting schedule using arm’s reach rule for commode and bathroom/Visual Cue: Yellow wristband and red socks/Bed in lowest position, wheels locked, appropriate side rails in place/Call bell, personal items and telephone in reach/Instruct patient to call for assistance before getting out of bed or chair/Non-slip footwear when patient is out of bed/[place] to call system/Physically safe environment - no spills, clutter or unnecessary equipment/Purposeful Proactive Rounding/Room/bathroom lighting operational, light cord in reach

## 2022-06-18 NOTE — H&P ADULT - PROBLEM SELECTOR PLAN 4
Assessment:  - patient has hx of bipolar disorder and is currently on multiple antipsychotics: High dose seroquel, abilify, chlorpromazine ,lurasidone, tizanidine  - QTc 464 ms, already prolonged  - previous psych consult recommended continuation of all antipsychotics    Plan:  - will hold lurasidone and chlorpromazine, tizanidine for now, concern of multiple antipsychotics causing prolonged QTc  - tele monitoring  - recommend psych consult in the AM to review medications, and restart the other medications as needed  - will get a repeat EKG  - patient does not appear agitated at this time  - monitor for NMS in the setting of high dose antipsychotic use  - c/w abilify, seroquel (BID dosing only)
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
## 2022-06-19 LAB
ANION GAP SERPL CALC-SCNC: 10 MMOL/L — SIGNIFICANT CHANGE UP (ref 7–14)
ANISOCYTOSIS BLD QL: SLIGHT — SIGNIFICANT CHANGE UP
APPEARANCE UR: ABNORMAL
BACTERIA # UR AUTO: ABNORMAL
BASE EXCESS BLDV CALC-SCNC: -6.7 MMOL/L — LOW (ref -2–3)
BASOPHILS # BLD AUTO: 0.03 K/UL — SIGNIFICANT CHANGE UP (ref 0–0.2)
BASOPHILS NFR BLD AUTO: 0.9 % — SIGNIFICANT CHANGE UP (ref 0–2)
BILIRUB UR-MCNC: NEGATIVE — SIGNIFICANT CHANGE UP
BLOOD GAS VENOUS COMPREHENSIVE RESULT: SIGNIFICANT CHANGE UP
BUN SERPL-MCNC: 8 MG/DL — SIGNIFICANT CHANGE UP (ref 7–23)
CALCIUM SERPL-MCNC: 9.1 MG/DL — SIGNIFICANT CHANGE UP (ref 8.4–10.5)
CHLORIDE BLDV-SCNC: 91 MMOL/L — LOW (ref 96–108)
CHLORIDE SERPL-SCNC: 105 MMOL/L — SIGNIFICANT CHANGE UP (ref 98–107)
CO2 BLDV-SCNC: 21.6 MMOL/L — LOW (ref 22–26)
CO2 SERPL-SCNC: 24 MMOL/L — SIGNIFICANT CHANGE UP (ref 22–31)
COLOR SPEC: SIGNIFICANT CHANGE UP
CREAT SERPL-MCNC: 0.76 MG/DL — SIGNIFICANT CHANGE UP (ref 0.5–1.3)
DIFF PNL FLD: ABNORMAL
EGFR: 105 ML/MIN/1.73M2 — SIGNIFICANT CHANGE UP
EOSINOPHIL # BLD AUTO: 0 K/UL — SIGNIFICANT CHANGE UP (ref 0–0.5)
EOSINOPHIL NFR BLD AUTO: 0 % — SIGNIFICANT CHANGE UP (ref 0–6)
EPI CELLS # UR: 1 /HPF — SIGNIFICANT CHANGE UP (ref 0–5)
GAS PNL BLDV: 120 MMOL/L — CRITICAL LOW (ref 136–145)
GAS PNL BLDV: SIGNIFICANT CHANGE UP
GLUCOSE BLDV-MCNC: > 685 MG/DL — SIGNIFICANT CHANGE UP (ref 70–99)
GLUCOSE SERPL-MCNC: 105 MG/DL — HIGH (ref 70–99)
GLUCOSE UR QL: NEGATIVE — SIGNIFICANT CHANGE UP
HCO3 BLDV-SCNC: 20 MMOL/L — LOW (ref 22–29)
HCT VFR BLD CALC: 26.2 % — LOW (ref 34.5–45)
HCT VFR BLD CALC: 34.1 % — LOW (ref 34.5–45)
HCT VFR BLDA CALC: 27 % — LOW (ref 34.5–46.5)
HGB BLD CALC-MCNC: 9 G/DL — LOW (ref 11.5–15.5)
HGB BLD-MCNC: 10.8 G/DL — LOW (ref 11.5–15.5)
HGB BLD-MCNC: 8.1 G/DL — LOW (ref 11.5–15.5)
HYALINE CASTS # UR AUTO: 0 /LPF — SIGNIFICANT CHANGE UP (ref 0–7)
IANC: 1.77 K/UL — LOW (ref 1.8–7.4)
KETONES UR-MCNC: NEGATIVE — SIGNIFICANT CHANGE UP
LACTATE BLDV-MCNC: 0.7 MMOL/L — SIGNIFICANT CHANGE UP (ref 0.5–2)
LEUKOCYTE ESTERASE UR-ACNC: NEGATIVE — SIGNIFICANT CHANGE UP
LYMPHOCYTES # BLD AUTO: 1.4 K/UL — SIGNIFICANT CHANGE UP (ref 1–3.3)
LYMPHOCYTES # BLD AUTO: 38 % — SIGNIFICANT CHANGE UP (ref 13–44)
MAGNESIUM SERPL-MCNC: 1.4 MG/DL — LOW (ref 1.6–2.6)
MAGNESIUM SERPL-MCNC: 1.8 MG/DL — SIGNIFICANT CHANGE UP (ref 1.6–2.6)
MANUAL SMEAR VERIFICATION: SIGNIFICANT CHANGE UP
MCHC RBC-ENTMCNC: 29.8 PG — SIGNIFICANT CHANGE UP (ref 27–34)
MCHC RBC-ENTMCNC: 30.1 PG — SIGNIFICANT CHANGE UP (ref 27–34)
MCHC RBC-ENTMCNC: 30.9 GM/DL — LOW (ref 32–36)
MCHC RBC-ENTMCNC: 31.7 GM/DL — LOW (ref 32–36)
MCV RBC AUTO: 95 FL — SIGNIFICANT CHANGE UP (ref 80–100)
MCV RBC AUTO: 96.3 FL — SIGNIFICANT CHANGE UP (ref 80–100)
MONOCYTES # BLD AUTO: 0.3 K/UL — SIGNIFICANT CHANGE UP (ref 0–0.9)
MONOCYTES NFR BLD AUTO: 8 % — SIGNIFICANT CHANGE UP (ref 2–14)
MYELOCYTES NFR BLD: 0.9 % — HIGH (ref 0–0)
NEUTROPHILS # BLD AUTO: 1.89 K/UL — SIGNIFICANT CHANGE UP (ref 1.8–7.4)
NEUTROPHILS NFR BLD AUTO: 51.3 % — SIGNIFICANT CHANGE UP (ref 43–77)
NITRITE UR-MCNC: NEGATIVE — SIGNIFICANT CHANGE UP
NRBC # BLD: 0 /100 WBCS — SIGNIFICANT CHANGE UP
NRBC # BLD: 5 /100 — HIGH (ref 0–0)
NRBC # FLD: 0 K/UL — SIGNIFICANT CHANGE UP
NT-PROBNP SERPL-SCNC: 41 PG/ML — SIGNIFICANT CHANGE UP
PCO2 BLDV: 46 MMHG — HIGH (ref 39–42)
PH BLDV: 7.25 — LOW (ref 7.32–7.43)
PH UR: 7 — SIGNIFICANT CHANGE UP (ref 5–8)
PHOSPHATE SERPL-MCNC: 3.6 MG/DL — SIGNIFICANT CHANGE UP (ref 2.5–4.5)
PLAT MORPH BLD: NORMAL — SIGNIFICANT CHANGE UP
PLATELET # BLD AUTO: 129 K/UL — LOW (ref 150–400)
PLATELET # BLD AUTO: 92 K/UL — LOW (ref 150–400)
PLATELET COUNT - ESTIMATE: ABNORMAL
PO2 BLDV: 158 MMHG — SIGNIFICANT CHANGE UP
POIKILOCYTOSIS BLD QL AUTO: SLIGHT — SIGNIFICANT CHANGE UP
POTASSIUM BLDV-SCNC: 3.5 MMOL/L — SIGNIFICANT CHANGE UP (ref 3.5–5.1)
POTASSIUM SERPL-MCNC: 4.4 MMOL/L — SIGNIFICANT CHANGE UP (ref 3.5–5.3)
POTASSIUM SERPL-SCNC: 4.4 MMOL/L — SIGNIFICANT CHANGE UP (ref 3.5–5.3)
PROT UR-MCNC: ABNORMAL
RBC # BLD: 2.72 M/UL — LOW (ref 3.8–5.2)
RBC # BLD: 3.59 M/UL — LOW (ref 3.8–5.2)
RBC # FLD: 14.7 % — HIGH (ref 10.3–14.5)
RBC # FLD: 15.4 % — HIGH (ref 10.3–14.5)
RBC BLD AUTO: ABNORMAL
RBC CASTS # UR COMP ASSIST: >720 /HPF — HIGH (ref 0–4)
SAO2 % BLDV: 100 % — SIGNIFICANT CHANGE UP
SODIUM SERPL-SCNC: 139 MMOL/L — SIGNIFICANT CHANGE UP (ref 135–145)
SP GR SPEC: 1.01 — SIGNIFICANT CHANGE UP (ref 1–1.05)
TSH SERPL-MCNC: 0.62 UIU/ML — SIGNIFICANT CHANGE UP (ref 0.27–4.2)
UROBILINOGEN FLD QL: SIGNIFICANT CHANGE UP
VARIANT LYMPHS # BLD: 0.9 % — SIGNIFICANT CHANGE UP (ref 0–6)
WBC # BLD: 3.69 K/UL — LOW (ref 3.8–10.5)
WBC # BLD: 4.7 K/UL — SIGNIFICANT CHANGE UP (ref 3.8–10.5)
WBC # FLD AUTO: 3.69 K/UL — LOW (ref 3.8–10.5)
WBC # FLD AUTO: 4.7 K/UL — SIGNIFICANT CHANGE UP (ref 3.8–10.5)
WBC UR QL: 1 /HPF — SIGNIFICANT CHANGE UP (ref 0–5)

## 2022-06-19 PROCEDURE — 99233 SBSQ HOSP IP/OBS HIGH 50: CPT

## 2022-06-19 RX ADMIN — OXYCODONE HYDROCHLORIDE 10 MILLIGRAM(S): 5 TABLET ORAL at 10:12

## 2022-06-19 RX ADMIN — ENOXAPARIN SODIUM 120 MILLIGRAM(S): 100 INJECTION SUBCUTANEOUS at 06:27

## 2022-06-19 RX ADMIN — Medication 50 MILLIGRAM(S): at 23:26

## 2022-06-19 RX ADMIN — OXYCODONE HYDROCHLORIDE 10 MILLIGRAM(S): 5 TABLET ORAL at 00:01

## 2022-06-19 RX ADMIN — DIVALPROEX SODIUM 500 MILLIGRAM(S): 500 TABLET, DELAYED RELEASE ORAL at 13:27

## 2022-06-19 RX ADMIN — QUETIAPINE FUMARATE 200 MILLIGRAM(S): 200 TABLET, FILM COATED ORAL at 17:24

## 2022-06-19 RX ADMIN — Medication 50 MILLIGRAM(S): at 17:24

## 2022-06-19 RX ADMIN — Medication 100 MILLIGRAM(S): at 13:29

## 2022-06-19 RX ADMIN — Medication 50 MILLIGRAM(S): at 20:48

## 2022-06-19 RX ADMIN — Medication 0.5 MILLIGRAM(S): at 16:47

## 2022-06-19 RX ADMIN — OXYCODONE HYDROCHLORIDE 10 MILLIGRAM(S): 5 TABLET ORAL at 23:37

## 2022-06-19 RX ADMIN — Medication 50 MILLIGRAM(S): at 06:46

## 2022-06-19 RX ADMIN — Medication 50 MILLIGRAM(S): at 00:03

## 2022-06-19 RX ADMIN — Medication 100 MILLIGRAM(S): at 21:44

## 2022-06-19 RX ADMIN — QUETIAPINE FUMARATE 50 MILLIGRAM(S): 200 TABLET, FILM COATED ORAL at 13:27

## 2022-06-19 RX ADMIN — ARIPIPRAZOLE 30 MILLIGRAM(S): 15 TABLET ORAL at 13:28

## 2022-06-19 RX ADMIN — DIVALPROEX SODIUM 1000 MILLIGRAM(S): 500 TABLET, DELAYED RELEASE ORAL at 21:44

## 2022-06-19 RX ADMIN — OXYCODONE HYDROCHLORIDE 10 MILLIGRAM(S): 5 TABLET ORAL at 14:29

## 2022-06-19 RX ADMIN — Medication 100 MILLIGRAM(S): at 06:27

## 2022-06-19 RX ADMIN — Medication 50 MILLIGRAM(S): at 16:48

## 2022-06-19 RX ADMIN — Medication 50 MILLIGRAM(S): at 09:13

## 2022-06-19 RX ADMIN — ENOXAPARIN SODIUM 120 MILLIGRAM(S): 100 INJECTION SUBCUTANEOUS at 17:24

## 2022-06-19 RX ADMIN — Medication 3 MILLIGRAM(S): at 21:44

## 2022-06-19 RX ADMIN — OXYCODONE HYDROCHLORIDE 10 MILLIGRAM(S): 5 TABLET ORAL at 09:12

## 2022-06-19 RX ADMIN — Medication 75 MICROGRAM(S): at 06:27

## 2022-06-19 RX ADMIN — OXYCODONE HYDROCHLORIDE 10 MILLIGRAM(S): 5 TABLET ORAL at 18:26

## 2022-06-19 RX ADMIN — LURASIDONE HYDROCHLORIDE 40 MILLIGRAM(S): 40 TABLET ORAL at 13:29

## 2022-06-19 RX ADMIN — PANTOPRAZOLE SODIUM 40 MILLIGRAM(S): 20 TABLET, DELAYED RELEASE ORAL at 06:27

## 2022-06-19 RX ADMIN — Medication 0.5 MILLIGRAM(S): at 06:45

## 2022-06-19 RX ADMIN — QUETIAPINE FUMARATE 200 MILLIGRAM(S): 200 TABLET, FILM COATED ORAL at 06:27

## 2022-06-19 RX ADMIN — Medication 1 TABLET(S): at 13:27

## 2022-06-19 RX ADMIN — OXYCODONE HYDROCHLORIDE 10 MILLIGRAM(S): 5 TABLET ORAL at 13:29

## 2022-06-19 NOTE — PROGRESS NOTE ADULT - PROBLEM SELECTOR PLAN 7
- Hx of RLE DVT, on Eliquis 5mg BID outpatient   - Will start lovenox pending IR I/D - Hx of RLE DVT, on Eliquis 5mg BID outpatient   - Will start lovenox, d/c day of I/D

## 2022-06-19 NOTE — PROGRESS NOTE ADULT - PROBLEM SELECTOR PLAN 3
Assessment:  - patient unknown timeframe of LE swelling b/l    Plan:  - will get dopplers of LE to evaluate for DVT in the setting of factor V leiden Assessment:  - patient unknown timeframe of LE swelling b/l    Plan:  - will get dopplers of LE to evaluate for DVT in the setting of factor V leiden  - Pro BNP w/i nl

## 2022-06-19 NOTE — PROGRESS NOTE ADULT - PROBLEM SELECTOR PLAN 1
Assessment:  - patient presented with abscess of the L shoulder secondary to IM injection  - no leukocytosis however is tachycardic and mildly low BP  - previously had R shoulder abscess in the past  - CT L shoulder show Left upper extremity abscess closely associated with the deltoid musculature. A couple foci of subcutaneous gas along the inferior margin of the collection are most likely related to recent upper extremity injection.   - surgery consulted, no indication for intervention, negative for nec fasc   - IR consulted for I/D, will obtain cultures   - D/c vancomycin, continue on clindamycin 600 TID IV Assessment:  - patient presented with abscess of the L shoulder secondary to IM injection  - no leukocytosis however is tachycardic and mildly low BP  - previously had R shoulder abscess in the past  - CT L shoulder show Left upper extremity abscess closely associated with the deltoid musculature. A couple foci of subcutaneous gas along the inferior margin of the collection are most likely related to recent upper extremity injection.   - surgery consulted, no indication for intervention, negative for nec fasc   - IR consulted for I/D, pending drainage on Mon/Tues per IR  - D/c vancomycin, continue on clindamycin 600 TID IV

## 2022-06-19 NOTE — PROGRESS NOTE ADULT - SUBJECTIVE AND OBJECTIVE BOX
Authored By:  Rony Walton MD  PGY-1, Internal Medicine    INTERVAL HPI/OVERNIGHT EVENTS:     SUBJECTIVE: Patient seen and examined at bedside this morning.     OBJECTIVE:      CAPILLARY BLOOD GLUCOSE          ICU Vital Signs Last 24 Hrs  T(C): 37.2 (19 Jun 2022 05:42), Max: 37.2 (19 Jun 2022 05:42)  T(F): 98.9 (19 Jun 2022 05:42), Max: 98.9 (19 Jun 2022 05:42)  HR: 88 (19 Jun 2022 05:42) (84 - 94)  BP: 108/66 (19 Jun 2022 05:42) (105/66 - 111/77)  BP(mean): --  ABP: --  ABP(mean): --  RR: 18 (19 Jun 2022 05:42) (16 - 18)  SpO2: 98% (19 Jun 2022 05:42) (98% - 100%)    PHYSICAL EXAM:      MEDICATIONS:  MEDICATIONS  (STANDING):  ARIPiprazole 30 milliGRAM(s) Oral daily  chlorproMAZINE    Injectable 50 milliGRAM(s) IntraMuscular <User Schedule>  clindamycin IVPB 600 milliGRAM(s) IV Intermittent every 8 hours  diVALproex  milliGRAM(s) Oral daily  diVALproex ER 1000 milliGRAM(s) Oral at bedtime  enoxaparin Injectable 120 milliGRAM(s) SubCutaneous every 12 hours  levothyroxine 75 MICROGram(s) Oral daily  LORazepam     Tablet 0.5 milliGRAM(s) Oral <User Schedule>  lurasidone 40 milliGRAM(s) Oral daily  melatonin 3 milliGRAM(s) Oral at bedtime  multivitamin 1 Tablet(s) Oral daily  pantoprazole    Tablet 40 milliGRAM(s) Oral before breakfast  polyethylene glycol 3350 Oral Powder - Peds 17 Gram(s) Oral daily  QUEtiapine 200 milliGRAM(s) Oral two times a day  QUEtiapine 50 milliGRAM(s) Oral <User Schedule>    MEDICATIONS  (PRN):  acetaminophen     Tablet .. 650 milliGRAM(s) Oral every 6 hours PRN Temp greater or equal to 38C (100.4F), Mild Pain (1 - 3)  ALBUTerol    90 MICROgram(s) HFA Inhaler 2 Puff(s) Inhalation every 6 hours PRN Shortness of Breath and/or Wheezing  chlorproMAZINE    Injectable 50 milliGRAM(s) IntraMuscular every 4 hours PRN Agitation  diphenhydrAMINE 50 milliGRAM(s) Oral every 8 hours PRN Rash and/or Itching  LORazepam   Injectable 2 milliGRAM(s) IntraMuscular every 4 hours PRN Severe Agitation  oxyCODONE    IR 10 milliGRAM(s) Oral every 4 hours PRN Severe Pain (7 - 10)  oxyCODONE    IR 5 milliGRAM(s) Oral every 4 hours PRN Moderate Pain (4 - 6)      ALLERGIES:  Allergies    latex (Blisters)  penicillin (Hives)  penicillin (Other)  pineapple (Unknown)  Toradol (Rash)  Toradol (Unknown)  Zofran (Hives)  Zofran (Unknown)    Intolerances        LABS:                        8.1    x     )-----------( x        ( 19 Jun 2022 06:42 )             26.2     Hemoglobin: 8.1 g/dL (06-19 @ 06:42)  Hemoglobin: 10.6 g/dL (06-17 @ 21:52)    CBC Full  -  ( 19 Jun 2022 06:42 )  WBC Count : x  RBC Count : 2.72 M/uL  Hemoglobin : 8.1 g/dL  Hematocrit : 26.2 %  Platelet Count - Automated : x  Mean Cell Volume : 96.3 fL  Mean Cell Hemoglobin : 29.8 pg  Mean Cell Hemoglobin Concentration : 30.9 gm/dL  Auto Neutrophil # : x  Auto Lymphocyte # : x  Auto Monocyte # : x  Auto Eosinophil # : x  Auto Basophil # : x  Auto Neutrophil % : x  Auto Lymphocyte % : x  Auto Monocyte % : x  Auto Eosinophil % : x  Auto Basophil % : x    06-17    141  |  106  |  10  ----------------------------<  82  4.7   |  23  |  0.89    Ca    9.3      17 Jun 2022 21:52  Phos  4.6     06-17  Mg     1.70     06-17    TPro  6.8  /  Alb  3.8  /  TBili  0.2  /  DBili  x   /  AST  15  /  ALT  19  /  AlkPhos  44  06-17    Creatinine Trend: 0.89<--, 0.70<--, 0.72<--, 0.70<--, 0.80<--, 0.78<--  LIVER FUNCTIONS - ( 17 Jun 2022 21:52 )  Alb: 3.8 g/dL / Pro: 6.8 g/dL / ALK PHOS: 44 U/L / ALT: 19 U/L / AST: 15 U/L / GGT: x               hs Troponin:        06:54 - VBG - pH: 7.25  | pCO2: 46    | pO2: 158   | Lactate: 0.7          CSF:                      EKG:   MICROBIOLOGY:    Culture - Blood (collected 17 Jun 2022 23:40)  Source: .Blood Blood-Venous  Preliminary Report (19 Jun 2022 03:01):    No growth to date.    Culture - Blood (collected 17 Jun 2022 23:30)  Source: .Blood Blood-Peripheral  Preliminary Report (19 Jun 2022 03:01):    No growth to date.    Culture - Acid Fast - Body Fluid w/Smear (collected 16 Jun 2022 12:53)  Source: .Body Fluid Interstitial Fluid  Preliminary Report (18 Jun 2022 15:05):    Culture is being performed.      IMAGING:      Labs, imaging, EKG personally reviewed    RADIOLOGY & ADDITIONAL TESTS: Reviewed. Authored By:  Rony Walton MD  PGY-1, Internal Medicine    INTERVAL HPI/OVERNIGHT EVENTS:     SUBJECTIVE: Patient seen and examined at bedside this morning. No acute events overnight. ROS negative.     OBJECTIVE:    Vital Signs Last 24 Hrs  T(C): 37.2 (19 Jun 2022 05:42), Max: 37.2 (19 Jun 2022 05:42)  T(F): 98.9 (19 Jun 2022 05:42), Max: 98.9 (19 Jun 2022 05:42)  HR: 88 (19 Jun 2022 05:42) (84 - 94)  BP: 108/66 (19 Jun 2022 05:42) (105/66 - 111/77)  BP(mean): --  ABP: --  ABP(mean): --  RR: 18 (19 Jun 2022 05:42) (16 - 18)  SpO2: 98% (19 Jun 2022 05:42) (98% - 100%)    PHYSICAL EXAM:  Gen: AAOx3, non-toxic  Head: NCAT  HEENT: EOMI, oral mucosa moist, normal conjunctiva  Lung: CTAB, no respiratory distress, no wheezes/rhonchi/rales B/L, speaking in full sentences  CV: RRR, no murmurs, rubs or gallops, trace LE edema bilaterally  Abd: soft, NTND, no guarding, no CVA tenderness, L CVA tenderness   MSK: no visible deformities  Neuro: No focal sensory or motor deficits, normal CN exam   Skin: Warm, well perfused, no rash  Psych: normal affect.      MEDICATIONS:  MEDICATIONS  (STANDING):  ARIPiprazole 30 milliGRAM(s) Oral daily  chlorproMAZINE    Injectable 50 milliGRAM(s) IntraMuscular <User Schedule>  clindamycin IVPB 600 milliGRAM(s) IV Intermittent every 8 hours  diVALproex  milliGRAM(s) Oral daily  diVALproex ER 1000 milliGRAM(s) Oral at bedtime  enoxaparin Injectable 120 milliGRAM(s) SubCutaneous every 12 hours  levothyroxine 75 MICROGram(s) Oral daily  LORazepam     Tablet 0.5 milliGRAM(s) Oral <User Schedule>  lurasidone 40 milliGRAM(s) Oral daily  melatonin 3 milliGRAM(s) Oral at bedtime  multivitamin 1 Tablet(s) Oral daily  pantoprazole    Tablet 40 milliGRAM(s) Oral before breakfast  polyethylene glycol 3350 Oral Powder - Peds 17 Gram(s) Oral daily  QUEtiapine 200 milliGRAM(s) Oral two times a day  QUEtiapine 50 milliGRAM(s) Oral <User Schedule>    MEDICATIONS  (PRN):  acetaminophen     Tablet .. 650 milliGRAM(s) Oral every 6 hours PRN Temp greater or equal to 38C (100.4F), Mild Pain (1 - 3)  ALBUTerol    90 MICROgram(s) HFA Inhaler 2 Puff(s) Inhalation every 6 hours PRN Shortness of Breath and/or Wheezing  chlorproMAZINE    Injectable 50 milliGRAM(s) IntraMuscular every 4 hours PRN Agitation  diphenhydrAMINE 50 milliGRAM(s) Oral every 8 hours PRN Rash and/or Itching  LORazepam   Injectable 2 milliGRAM(s) IntraMuscular every 4 hours PRN Severe Agitation  oxyCODONE    IR 10 milliGRAM(s) Oral every 4 hours PRN Severe Pain (7 - 10)  oxyCODONE    IR 5 milliGRAM(s) Oral every 4 hours PRN Moderate Pain (4 - 6)      ALLERGIES:  Allergies    latex (Blisters)  penicillin (Hives)  penicillin (Other)  pineapple (Unknown)  Toradol (Rash)  Toradol (Unknown)  Zofran (Hives)  Zofran (Unknown)    Intolerances        LABS:                        8.1    x     )-----------( x        ( 19 Jun 2022 06:42 )             26.2     Hemoglobin: 8.1 g/dL (06-19 @ 06:42)  Hemoglobin: 10.6 g/dL (06-17 @ 21:52)    CBC Full  -  ( 19 Jun 2022 06:42 )  WBC Count : x  RBC Count : 2.72 M/uL  Hemoglobin : 8.1 g/dL  Hematocrit : 26.2 %  Platelet Count - Automated : x  Mean Cell Volume : 96.3 fL  Mean Cell Hemoglobin : 29.8 pg  Mean Cell Hemoglobin Concentration : 30.9 gm/dL  Auto Neutrophil # : x  Auto Lymphocyte # : x  Auto Monocyte # : x  Auto Eosinophil # : x  Auto Basophil # : x  Auto Neutrophil % : x  Auto Lymphocyte % : x  Auto Monocyte % : x  Auto Eosinophil % : x  Auto Basophil % : x    06-17    141  |  106  |  10  ----------------------------<  82  4.7   |  23  |  0.89    Ca    9.3      17 Jun 2022 21:52  Phos  4.6     06-17  Mg     1.70     06-17    TPro  6.8  /  Alb  3.8  /  TBili  0.2  /  DBili  x   /  AST  15  /  ALT  19  /  AlkPhos  44  06-17    Creatinine Trend: 0.89<--, 0.70<--, 0.72<--, 0.70<--, 0.80<--, 0.78<--  LIVER FUNCTIONS - ( 17 Jun 2022 21:52 )  Alb: 3.8 g/dL / Pro: 6.8 g/dL / ALK PHOS: 44 U/L / ALT: 19 U/L / AST: 15 U/L / GGT: x               06:54 - VBG - pH: 7.25  | pCO2: 46    | pO2: 158   | Lactate: 0.7        EKG:   MICROBIOLOGY:    Culture - Blood (collected 17 Jun 2022 23:40)  Source: .Blood Blood-Venous  Preliminary Report (19 Jun 2022 03:01):    No growth to date.    Culture - Blood (collected 17 Jun 2022 23:30)  Source: .Blood Blood-Peripheral  Preliminary Report (19 Jun 2022 03:01):    No growth to date.    Culture - Acid Fast - Body Fluid w/Smear (collected 16 Jun 2022 12:53)  Source: .Body Fluid Interstitial Fluid  Preliminary Report (18 Jun 2022 15:05):    Culture is being performed.      IMAGING:      Labs, imaging, EKG personally reviewed    RADIOLOGY & ADDITIONAL TESTS: Reviewed.

## 2022-06-19 NOTE — PROGRESS NOTE ADULT - PROBLEM SELECTOR PLAN 4
Assessment:  - patient has hx of bipolar disorder and is currently on multiple antipsychotics: High dose seroquel, abilify, chlorpromazine ,lurasidone, tizanidine  - QTc 464 ms, already prolonged  - previous psych consult recommended continuation of all antipsychotics    Plan:  - will hold lurasidone and chlorpromazine, tizanidine for now, concern of multiple antipsychotics causing prolonged QTc  - c/w abilify, seroquel (BID dosing only)  - Psych consulted for medication management/ polypharmacy Assessment:  - patient has hx of bipolar disorder and is currently on multiple antipsychotics: High dose seroquel, abilify, chlorpromazine ,lurasidone, tizanidine  - Qtc 460   - previous psych consult recommended continuation of all antipsychotics    Plan:  - Abilfy 30mg daily, Latuda 40mg at noon  - Depakote  daily and 1000 night   - Seroquel 200mg at 8am and 8pm and at 12PM   - Chlorpromazine 50mg @ 7a, 4p, 8p, 11p  - If Agitated: Chlorpromazine 50mg IM q4h prn with Ativan 2mg IM q4h prn but avoid arms   - Psych following, recs appreciated

## 2022-06-20 DIAGNOSIS — D69.6 THROMBOCYTOPENIA, UNSPECIFIED: ICD-10-CM

## 2022-06-20 DIAGNOSIS — F79 UNSPECIFIED INTELLECTUAL DISABILITIES: ICD-10-CM

## 2022-06-20 DIAGNOSIS — F63.81 INTERMITTENT EXPLOSIVE DISORDER: ICD-10-CM

## 2022-06-20 LAB
ALBUMIN SERPL ELPH-MCNC: 4.2 G/DL — SIGNIFICANT CHANGE UP (ref 3.3–5)
ALP SERPL-CCNC: 50 U/L — SIGNIFICANT CHANGE UP (ref 40–120)
ALT FLD-CCNC: 24 U/L — SIGNIFICANT CHANGE UP (ref 4–33)
ANION GAP SERPL CALC-SCNC: 17 MMOL/L — HIGH (ref 7–14)
APPEARANCE UR: CLEAR — SIGNIFICANT CHANGE UP
AST SERPL-CCNC: 22 U/L — SIGNIFICANT CHANGE UP (ref 4–32)
BACTERIA # UR AUTO: NEGATIVE — SIGNIFICANT CHANGE UP
BASOPHILS # BLD AUTO: 0.02 K/UL — SIGNIFICANT CHANGE UP (ref 0–0.2)
BASOPHILS NFR BLD AUTO: 0.5 % — SIGNIFICANT CHANGE UP (ref 0–2)
BILIRUB SERPL-MCNC: 0.2 MG/DL — SIGNIFICANT CHANGE UP (ref 0.2–1.2)
BILIRUB UR-MCNC: NEGATIVE — SIGNIFICANT CHANGE UP
BUN SERPL-MCNC: 8 MG/DL — SIGNIFICANT CHANGE UP (ref 7–23)
CALCIUM SERPL-MCNC: 9.3 MG/DL — SIGNIFICANT CHANGE UP (ref 8.4–10.5)
CHLORIDE SERPL-SCNC: 101 MMOL/L — SIGNIFICANT CHANGE UP (ref 98–107)
CO2 SERPL-SCNC: 17 MMOL/L — LOW (ref 22–31)
COLOR SPEC: SIGNIFICANT CHANGE UP
CREAT SERPL-MCNC: 0.71 MG/DL — SIGNIFICANT CHANGE UP (ref 0.5–1.3)
DIFF PNL FLD: ABNORMAL
EGFR: 114 ML/MIN/1.73M2 — SIGNIFICANT CHANGE UP
EOSINOPHIL # BLD AUTO: 0.05 K/UL — SIGNIFICANT CHANGE UP (ref 0–0.5)
EOSINOPHIL NFR BLD AUTO: 1.2 % — SIGNIFICANT CHANGE UP (ref 0–6)
EPI CELLS # UR: 2 /HPF — SIGNIFICANT CHANGE UP (ref 0–5)
GLUCOSE BLDC GLUCOMTR-MCNC: 90 MG/DL — SIGNIFICANT CHANGE UP (ref 70–99)
GLUCOSE SERPL-MCNC: 93 MG/DL — SIGNIFICANT CHANGE UP (ref 70–99)
GLUCOSE UR QL: NEGATIVE — SIGNIFICANT CHANGE UP
HCT VFR BLD CALC: 32.5 % — LOW (ref 34.5–45)
HGB BLD-MCNC: 10.2 G/DL — LOW (ref 11.5–15.5)
HYALINE CASTS # UR AUTO: 0 /LPF — SIGNIFICANT CHANGE UP (ref 0–7)
IANC: 1.7 K/UL — LOW (ref 1.8–7.4)
IMM GRANULOCYTES NFR BLD AUTO: 0.2 % — SIGNIFICANT CHANGE UP (ref 0–1.5)
KETONES UR-MCNC: NEGATIVE — SIGNIFICANT CHANGE UP
LEUKOCYTE ESTERASE UR-ACNC: NEGATIVE — SIGNIFICANT CHANGE UP
LYMPHOCYTES # BLD AUTO: 2.11 K/UL — SIGNIFICANT CHANGE UP (ref 1–3.3)
LYMPHOCYTES # BLD AUTO: 48.8 % — HIGH (ref 13–44)
MCHC RBC-ENTMCNC: 29.9 PG — SIGNIFICANT CHANGE UP (ref 27–34)
MCHC RBC-ENTMCNC: 31.4 GM/DL — LOW (ref 32–36)
MCV RBC AUTO: 95.3 FL — SIGNIFICANT CHANGE UP (ref 80–100)
MONOCYTES # BLD AUTO: 0.43 K/UL — SIGNIFICANT CHANGE UP (ref 0–0.9)
MONOCYTES NFR BLD AUTO: 10 % — SIGNIFICANT CHANGE UP (ref 2–14)
NEUTROPHILS # BLD AUTO: 1.7 K/UL — LOW (ref 1.8–7.4)
NEUTROPHILS NFR BLD AUTO: 39.3 % — LOW (ref 43–77)
NITRITE UR-MCNC: NEGATIVE — SIGNIFICANT CHANGE UP
NRBC # BLD: 0 /100 WBCS — SIGNIFICANT CHANGE UP
NRBC # FLD: 0 K/UL — SIGNIFICANT CHANGE UP
PH UR: 7.5 — SIGNIFICANT CHANGE UP (ref 5–8)
PLATELET # BLD AUTO: 50 K/UL — LOW (ref 150–400)
POTASSIUM SERPL-MCNC: 4.8 MMOL/L — SIGNIFICANT CHANGE UP (ref 3.5–5.3)
POTASSIUM SERPL-SCNC: 4.8 MMOL/L — SIGNIFICANT CHANGE UP (ref 3.5–5.3)
PROT SERPL-MCNC: 7.6 G/DL — SIGNIFICANT CHANGE UP (ref 6–8.3)
PROT UR-MCNC: ABNORMAL
RBC # BLD: 3.41 M/UL — LOW (ref 3.8–5.2)
RBC # FLD: 15.2 % — HIGH (ref 10.3–14.5)
RBC CASTS # UR COMP ASSIST: >50 /HPF — SIGNIFICANT CHANGE UP (ref 0–4)
SODIUM SERPL-SCNC: 135 MMOL/L — SIGNIFICANT CHANGE UP (ref 135–145)
SP GR SPEC: 1.01 — SIGNIFICANT CHANGE UP (ref 1–1.05)
UROBILINOGEN FLD QL: SIGNIFICANT CHANGE UP
WBC # BLD: 4.32 K/UL — SIGNIFICANT CHANGE UP (ref 3.8–10.5)
WBC # FLD AUTO: 4.32 K/UL — SIGNIFICANT CHANGE UP (ref 3.8–10.5)
WBC UR QL: 0 /HPF — SIGNIFICANT CHANGE UP (ref 0–5)

## 2022-06-20 PROCEDURE — 99232 SBSQ HOSP IP/OBS MODERATE 35: CPT

## 2022-06-20 PROCEDURE — 99233 SBSQ HOSP IP/OBS HIGH 50: CPT

## 2022-06-20 PROCEDURE — 93010 ELECTROCARDIOGRAM REPORT: CPT

## 2022-06-20 RX ORDER — CHLORPROMAZINE HCL 10 MG
50 TABLET ORAL
Refills: 0 | Status: DISCONTINUED | OUTPATIENT
Start: 2022-06-20 | End: 2022-06-22

## 2022-06-20 RX ORDER — CHLORPROMAZINE HCL 10 MG
50 TABLET ORAL
Refills: 0 | Status: DISCONTINUED | OUTPATIENT
Start: 2022-06-20 | End: 2022-06-20

## 2022-06-20 RX ORDER — CHLORHEXIDINE GLUCONATE 213 G/1000ML
1 SOLUTION TOPICAL DAILY
Refills: 0 | Status: DISCONTINUED | OUTPATIENT
Start: 2022-06-20 | End: 2022-07-02

## 2022-06-20 RX ADMIN — Medication 100 MILLIGRAM(S): at 13:55

## 2022-06-20 RX ADMIN — LURASIDONE HYDROCHLORIDE 40 MILLIGRAM(S): 40 TABLET ORAL at 11:14

## 2022-06-20 RX ADMIN — Medication 0.5 MILLIGRAM(S): at 17:14

## 2022-06-20 RX ADMIN — Medication 100 MILLIGRAM(S): at 05:08

## 2022-06-20 RX ADMIN — Medication 75 MICROGRAM(S): at 05:05

## 2022-06-20 RX ADMIN — OXYCODONE HYDROCHLORIDE 10 MILLIGRAM(S): 5 TABLET ORAL at 08:37

## 2022-06-20 RX ADMIN — OXYCODONE HYDROCHLORIDE 10 MILLIGRAM(S): 5 TABLET ORAL at 21:12

## 2022-06-20 RX ADMIN — OXYCODONE HYDROCHLORIDE 10 MILLIGRAM(S): 5 TABLET ORAL at 00:37

## 2022-06-20 RX ADMIN — OXYCODONE HYDROCHLORIDE 10 MILLIGRAM(S): 5 TABLET ORAL at 22:00

## 2022-06-20 RX ADMIN — Medication 3 MILLIGRAM(S): at 21:12

## 2022-06-20 RX ADMIN — Medication 50 MILLIGRAM(S): at 07:01

## 2022-06-20 RX ADMIN — Medication 104 MILLIGRAM(S): at 20:27

## 2022-06-20 RX ADMIN — OXYCODONE HYDROCHLORIDE 10 MILLIGRAM(S): 5 TABLET ORAL at 13:38

## 2022-06-20 RX ADMIN — Medication 50 MILLIGRAM(S): at 18:21

## 2022-06-20 RX ADMIN — OXYCODONE HYDROCHLORIDE 10 MILLIGRAM(S): 5 TABLET ORAL at 09:37

## 2022-06-20 RX ADMIN — Medication 50 MILLIGRAM(S): at 09:31

## 2022-06-20 RX ADMIN — ARIPIPRAZOLE 30 MILLIGRAM(S): 15 TABLET ORAL at 11:13

## 2022-06-20 RX ADMIN — Medication 100 MILLIGRAM(S): at 21:12

## 2022-06-20 RX ADMIN — OXYCODONE HYDROCHLORIDE 10 MILLIGRAM(S): 5 TABLET ORAL at 14:38

## 2022-06-20 RX ADMIN — QUETIAPINE FUMARATE 50 MILLIGRAM(S): 200 TABLET, FILM COATED ORAL at 11:18

## 2022-06-20 RX ADMIN — DIVALPROEX SODIUM 500 MILLIGRAM(S): 500 TABLET, DELAYED RELEASE ORAL at 11:13

## 2022-06-20 RX ADMIN — Medication 104 MILLIGRAM(S): at 23:10

## 2022-06-20 RX ADMIN — Medication 50 MILLIGRAM(S): at 01:49

## 2022-06-20 RX ADMIN — Medication 0.5 MILLIGRAM(S): at 07:03

## 2022-06-20 RX ADMIN — Medication 104 MILLIGRAM(S): at 12:57

## 2022-06-20 RX ADMIN — PANTOPRAZOLE SODIUM 40 MILLIGRAM(S): 20 TABLET, DELAYED RELEASE ORAL at 05:05

## 2022-06-20 RX ADMIN — DIVALPROEX SODIUM 1000 MILLIGRAM(S): 500 TABLET, DELAYED RELEASE ORAL at 21:13

## 2022-06-20 RX ADMIN — QUETIAPINE FUMARATE 200 MILLIGRAM(S): 200 TABLET, FILM COATED ORAL at 05:07

## 2022-06-20 RX ADMIN — Medication 1 TABLET(S): at 11:16

## 2022-06-20 RX ADMIN — QUETIAPINE FUMARATE 200 MILLIGRAM(S): 200 TABLET, FILM COATED ORAL at 17:15

## 2022-06-20 NOTE — PROGRESS NOTE ADULT - PROBLEM SELECTOR PLAN 8
Lovenox Full A/C dosing; d/c day of I/D - Hx of RLE DVT, on Eliquis 5mg BID outpatient   - Started on lovenox, d/c today pending procedure - Hx of RLE DVT, on Eliquis 5mg BID outpatient   - s/p lovenox pending I/D procedure, now d/c today pending procedure

## 2022-06-20 NOTE — PROGRESS NOTE ADULT - SUBJECTIVE AND OBJECTIVE BOX
Authored By:  Rony Walton MD  PGY-1, Internal Medicine    INTERVAL HPI/OVERNIGHT EVENTS:     SUBJECTIVE: Patient seen and examined at bedside this morning.     OBJECTIVE:      CAPILLARY BLOOD GLUCOSE          ICU Vital Signs Last 24 Hrs  T(C): 36.9 (2022 05:06), Max: 37.2 (2022 21:08)  T(F): 98.4 (2022 05:06), Max: 98.9 (2022 21:08)  HR: 88 (2022 05:06) (81 - 88)  BP: 115/84 (2022 05:06) (104/68 - 115/84)  BP(mean): --  ABP: --  ABP(mean): --  RR: 18 (2022 05:06) (17 - 18)  SpO2: 96% (2022 05:06) (95% - 99%)    PHYSICAL EXAM:      MEDICATIONS:  MEDICATIONS  (STANDING):  ARIPiprazole 30 milliGRAM(s) Oral daily  chlorproMAZINE    Injectable 50 milliGRAM(s) IntraMuscular <User Schedule>  clindamycin IVPB 600 milliGRAM(s) IV Intermittent every 8 hours  diVALproex  milliGRAM(s) Oral daily  diVALproex ER 1000 milliGRAM(s) Oral at bedtime  levothyroxine 75 MICROGram(s) Oral daily  LORazepam     Tablet 0.5 milliGRAM(s) Oral <User Schedule>  lurasidone 40 milliGRAM(s) Oral daily  melatonin 3 milliGRAM(s) Oral at bedtime  multivitamin 1 Tablet(s) Oral daily  pantoprazole    Tablet 40 milliGRAM(s) Oral before breakfast  polyethylene glycol 3350 Oral Powder - Peds 17 Gram(s) Oral daily  QUEtiapine 200 milliGRAM(s) Oral two times a day  QUEtiapine 50 milliGRAM(s) Oral <User Schedule>    MEDICATIONS  (PRN):  acetaminophen     Tablet .. 650 milliGRAM(s) Oral every 6 hours PRN Temp greater or equal to 38C (100.4F), Mild Pain (1 - 3)  ALBUTerol    90 MICROgram(s) HFA Inhaler 2 Puff(s) Inhalation every 6 hours PRN Shortness of Breath and/or Wheezing  chlorproMAZINE    Injectable 50 milliGRAM(s) IntraMuscular every 4 hours PRN Agitation  diphenhydrAMINE 50 milliGRAM(s) Oral every 8 hours PRN Rash and/or Itching  LORazepam   Injectable 2 milliGRAM(s) IntraMuscular every 4 hours PRN Severe Agitation  oxyCODONE    IR 10 milliGRAM(s) Oral every 4 hours PRN Severe Pain (7 - 10)  oxyCODONE    IR 5 milliGRAM(s) Oral every 4 hours PRN Moderate Pain (4 - 6)      ALLERGIES:  Allergies    latex (Blisters)  penicillin (Hives)  penicillin (Other)  pineapple (Unknown)  Toradol (Rash)  Toradol (Unknown)  Zofran (Hives)  Zofran (Unknown)    Intolerances        LABS:                        10.8   4.70  )-----------( 129      ( 2022 12:20 )             34.1     Hemoglobin: 10.8 g/dL ( @ 12:20)  Hemoglobin: 8.1 g/dL ( @ 06:42)  Hemoglobin: 10.6 g/dL ( @ 21:52)    CBC Full  -  ( 2022 12:20 )  WBC Count : 4.70 K/uL  RBC Count : 3.59 M/uL  Hemoglobin : 10.8 g/dL  Hematocrit : 34.1 %  Platelet Count - Automated : 129 K/uL  Mean Cell Volume : 95.0 fL  Mean Cell Hemoglobin : 30.1 pg  Mean Cell Hemoglobin Concentration : 31.7 gm/dL  Auto Neutrophil # : x  Auto Lymphocyte # : x  Auto Monocyte # : x  Auto Eosinophil # : x  Auto Basophil # : x  Auto Neutrophil % : x  Auto Lymphocyte % : x  Auto Monocyte % : x  Auto Eosinophil % : x  Auto Basophil % : x        139  |  105  |  8   ----------------------------<  105<H>  4.4   |  24  |  0.76    Ca    9.1      2022 12:20  Phos  3.6       Mg     1.80           Creatinine Trend: 0.76<--, 0.89<--, 0.70<--, 0.72<--, 0.70<--, 0.80<--        hs Troponin:            Urinalysis Basic - ( 2022 12:38 )    Color: Pink / Appearance: Slightly Turbid / S.009 / pH: x  Gluc: x / Ketone: Negative  / Bili: Negative / Urobili: <2 mg/dL   Blood: x / Protein: Trace / Nitrite: Negative   Leuk Esterase: Negative / RBC: >720 /HPF / WBC 1 /HPF   Sq Epi: x / Non Sq Epi: 1 /HPF / Bacteria: Few      CSF:                      EKG:   MICROBIOLOGY:    Culture - Blood (collected 2022 23:40)  Source: .Blood Blood-Venous  Preliminary Report (2022 03:01):    No growth to date.    Culture - Blood (collected 2022 23:30)  Source: .Blood Blood-Peripheral  Preliminary Report (2022 03:01):    No growth to date.      IMAGING:      Labs, imaging, EKG personally reviewed    RADIOLOGY & ADDITIONAL TESTS: Reviewed. Authored By:  Rony Walton MD  PGY-1, Internal Medicine    INTERVAL HPI/OVERNIGHT EVENTS:     SUBJECTIVE: Patient seen and examined at bedside this morning. No acute events overnight. ROS negative aside from shoulder pain which PRN pan medications helps with.     OBJECTIVE:      Vital Signs Last 24 Hrs  T(C): 36.9 (2022 05:06), Max: 37.2 (2022 21:08)  T(F): 98.4 (2022 05:06), Max: 98.9 (2022 21:08)  HR: 88 (2022 05:06) (81 - 88)  BP: 115/84 (2022 05:06) (104/68 - 115/84)  BP(mean): --  ABP: --  ABP(mean): --  RR: 18 (2022 05:06) (17 - 18)  SpO2: 96% (2022 05:06) (95% - 99%)    PHYSICAL EXAM:  Gen: AAOx3, non-toxic  Head: NCAT  HEENT: EOMI, oral mucosa moist, normal conjunctiva  Lung: CTAB, no respiratory distress, no wheezes/rhonchi/rales B/L, speaking in full sentences  CV: RRR, no murmurs, rubs or gallops, trace lower extremity edema   Abd: soft, NTND, no guarding, no CVA tenderness      MEDICATIONS:  MEDICATIONS  (STANDING):  ARIPiprazole 30 milliGRAM(s) Oral daily  chlorproMAZINE    Injectable 50 milliGRAM(s) IntraMuscular <User Schedule>  clindamycin IVPB 600 milliGRAM(s) IV Intermittent every 8 hours  diVALproex  milliGRAM(s) Oral daily  diVALproex ER 1000 milliGRAM(s) Oral at bedtime  levothyroxine 75 MICROGram(s) Oral daily  LORazepam     Tablet 0.5 milliGRAM(s) Oral <User Schedule>  lurasidone 40 milliGRAM(s) Oral daily  melatonin 3 milliGRAM(s) Oral at bedtime  multivitamin 1 Tablet(s) Oral daily  pantoprazole    Tablet 40 milliGRAM(s) Oral before breakfast  polyethylene glycol 3350 Oral Powder - Peds 17 Gram(s) Oral daily  QUEtiapine 200 milliGRAM(s) Oral two times a day  QUEtiapine 50 milliGRAM(s) Oral <User Schedule>    MEDICATIONS  (PRN):  acetaminophen     Tablet .. 650 milliGRAM(s) Oral every 6 hours PRN Temp greater or equal to 38C (100.4F), Mild Pain (1 - 3)  ALBUTerol    90 MICROgram(s) HFA Inhaler 2 Puff(s) Inhalation every 6 hours PRN Shortness of Breath and/or Wheezing  chlorproMAZINE    Injectable 50 milliGRAM(s) IntraMuscular every 4 hours PRN Agitation  diphenhydrAMINE 50 milliGRAM(s) Oral every 8 hours PRN Rash and/or Itching  LORazepam   Injectable 2 milliGRAM(s) IntraMuscular every 4 hours PRN Severe Agitation  oxyCODONE    IR 10 milliGRAM(s) Oral every 4 hours PRN Severe Pain (7 - 10)  oxyCODONE    IR 5 milliGRAM(s) Oral every 4 hours PRN Moderate Pain (4 - 6)      ALLERGIES:  Allergies    latex (Blisters)  penicillin (Hives)  penicillin (Other)  pineapple (Unknown)  Toradol (Rash)  Toradol (Unknown)  Zofran (Hives)  Zofran (Unknown)    Intolerances        LABS:                        10.8   4.70  )-----------( 129      ( 2022 12:20 )             34.1     Hemoglobin: 10.8 g/dL ( @ 12:20)  Hemoglobin: 8.1 g/dL ( @ 06:42)  Hemoglobin: 10.6 g/dL ( @ 21:52)    CBC Full  -  ( 2022 12:20 )  WBC Count : 4.70 K/uL  RBC Count : 3.59 M/uL  Hemoglobin : 10.8 g/dL  Hematocrit : 34.1 %  Platelet Count - Automated : 129 K/uL  Mean Cell Volume : 95.0 fL  Mean Cell Hemoglobin : 30.1 pg  Mean Cell Hemoglobin Concentration : 31.7 gm/dL  Auto Neutrophil # : x  Auto Lymphocyte # : x  Auto Monocyte # : x  Auto Eosinophil # : x  Auto Basophil # : x  Auto Neutrophil % : x  Auto Lymphocyte % : x  Auto Monocyte % : x  Auto Eosinophil % : x  Auto Basophil % : x        139  |  105  |  8   ----------------------------<  105<H>  4.4   |  24  |  0.76    Ca    9.1      2022 12:20  Phos  3.6     06-19  Mg     1.80     06-19      Creatinine Trend: 0.76<--, 0.89<--, 0.70<--, 0.72<--, 0.70<--, 0.80<--      Urinalysis Basic - ( 2022 12:38 )    Color: Pink / Appearance: Slightly Turbid / S.009 / pH: x  Gluc: x / Ketone: Negative  / Bili: Negative / Urobili: <2 mg/dL   Blood: x / Protein: Trace / Nitrite: Negative   Leuk Esterase: Negative / RBC: >720 /HPF / WBC 1 /HPF   Sq Epi: x / Non Sq Epi: 1 /HPF / Bacteria: Few        EKG:   MICROBIOLOGY:    Culture - Blood (collected 2022 23:40)  Source: .Blood Blood-Venous  Preliminary Report (2022 03:01):    No growth to date.    Culture - Blood (collected 2022 23:30)  Source: .Blood Blood-Peripheral  Preliminary Report (2022 03:01):    No growth to date.      IMAGING:      Labs, imaging, EKG personally reviewed    RADIOLOGY & ADDITIONAL TESTS: Reviewed.

## 2022-06-20 NOTE — PROGRESS NOTE ADULT - ASSESSMENT
37 y/o F with pmhx of bipolar disorder, hypothyroidism, factor V Leiden on eliquis, seizures, presented to the ED for new onset L shoulder pain. The patient found to have a L deltoid abscess likely secondary to IM injections. Admit for abscess.

## 2022-06-20 NOTE — PROGRESS NOTE ADULT - PROBLEM SELECTOR PLAN 7
- Hx of RLE DVT, on Eliquis 5mg BID outpatient   - Will start lovenox, d/c day of I/D Plt downtrending since admission; Plt 50 today   - likely 2/2 to Plt downtrending since admission; Plt 50 today   - differential includes sepsis vs hep products (low clinical suspicion) vs Depakote induced (low suspicion)   - d/c Lovenox and continue to monitor at this time   - F/U PTT/PT/INR

## 2022-06-20 NOTE — PROGRESS NOTE ADULT - PROBLEM SELECTOR PLAN 4
Assessment:  - patient has hx of bipolar disorder and is currently on multiple antipsychotics: High dose seroquel, abilify, chlorpromazine ,lurasidone, tizanidine  - Qtc 460   - previous psych consult recommended continuation of all antipsychotics    Plan:  - Abilfy 30mg daily, Latuda 40mg at noon  - Depakote  daily and 1000 night   - Seroquel 200mg at 8am and 8pm and at 12PM   - Chlorpromazine 50mg @ 7a, 4p, 8p, 11p  - If Agitated: Chlorpromazine 50mg IM q4h prn with Ativan 2mg IM q4h prn but avoid arms   - Psych following, recs appreciated Assessment:  - patient has hx of bipolar disorder and is currently on multiple antipsychotics: High dose seroquel, abilify, chlorpromazine ,lurasidone, tizanidine  - Qtc 460   - previous psych consult recommended continuation of all antipsychotics    Plan:  - Abilfy 30mg daily, Latuda 40mg at noon  - Depakote  daily and 1000 night   - Seroquel 200mg at 8am and 8pm and at 12PM   - Chlorpromazine 50mg @ 7a, 11AM, 8p, 11p (6/20)   - If Agitated: Chlorpromazine 50mg IM q4h prn with Ativan 2mg IM q4h prn but avoid arms   - Psych following, recs appreciated

## 2022-06-20 NOTE — PROGRESS NOTE ADULT - PROBLEM SELECTOR PLAN 3
Assessment:  - patient unknown timeframe of LE swelling b/l    Plan:  - will get dopplers of LE to evaluate for DVT in the setting of factor V leiden  - Pro BNP w/i nl Assessment:  - patient unknown timeframe of LE swelling b/l; believes that it started in March     Plan:  - will get dopplers of LE to evaluate for DVT in the setting of factor V leiden  - Pro BNP w/i nl

## 2022-06-20 NOTE — PROGRESS NOTE ADULT - PROBLEM SELECTOR PLAN 1
Assessment:  - patient presented with abscess of the L shoulder secondary to IM injection  - no leukocytosis however is tachycardic and mildly low BP  - previously had R shoulder abscess in the past  - CT L shoulder show Left upper extremity abscess closely associated with the deltoid musculature. A couple foci of subcutaneous gas along the inferior margin of the collection are most likely related to recent upper extremity injection.   - surgery consulted, no indication for intervention, negative for nec fasc   - IR consulted for I/D, pending drainage on Mon/Tues per IR  - D/c vancomycin, continue on clindamycin 600 TID IV Assessment:  - patient presented with abscess of the L shoulder secondary to IM injection  - no leukocytosis however is tachycardic and mildly low BP  - previously had R shoulder abscess in the past  - CT L shoulder show Left upper extremity abscess closely associated with the deltoid musculature. A couple foci of subcutaneous gas along the inferior margin of the collection are most likely related to recent upper extremity injection.   - surgery consulted, no indication for intervention, negative for nec fasc   - D/c vancomycin, continue on clindamycin 600 TID IV  - On IR schedule for 6/21, no need to be NPO Assessment:  - patient presented with abscess of the L shoulder secondary to IM injection  - no leukocytosis however is tachycardic and mildly low BP  - previously had R shoulder abscess in the past  - CT L shoulder show Left upper extremity abscess closely associated with the deltoid musculature. A couple foci of subcutaneous gas along the inferior margin of the collection are most likely related to recent upper extremity injection.   - surgery consulted, no indication for intervention, negative for nec fasc   - D/c vancomycin, continue on clindamycin 600 TID IV  - On IR schedule for Tues 6/21, no need to be NPO

## 2022-06-21 DIAGNOSIS — Z86.69 PERSONAL HISTORY OF OTHER DISEASES OF THE NERVOUS SYSTEM AND SENSE ORGANS: ICD-10-CM

## 2022-06-21 LAB
ANION GAP SERPL CALC-SCNC: 11 MMOL/L — SIGNIFICANT CHANGE UP (ref 7–14)
APTT BLD: 28.9 SEC — SIGNIFICANT CHANGE UP (ref 27–36.3)
BASE EXCESS BLDV CALC-SCNC: 1.8 MMOL/L — SIGNIFICANT CHANGE UP (ref -2–3)
BUN SERPL-MCNC: 8 MG/DL — SIGNIFICANT CHANGE UP (ref 7–23)
CA-I SERPL-SCNC: 1.25 MMOL/L — SIGNIFICANT CHANGE UP (ref 1.15–1.33)
CALCIUM SERPL-MCNC: 9.4 MG/DL — SIGNIFICANT CHANGE UP (ref 8.4–10.5)
CHLORIDE BLDV-SCNC: 105 MMOL/L — SIGNIFICANT CHANGE UP (ref 96–108)
CHLORIDE SERPL-SCNC: 104 MMOL/L — SIGNIFICANT CHANGE UP (ref 98–107)
CO2 BLDV-SCNC: 28.6 MMOL/L — HIGH (ref 22–26)
CO2 SERPL-SCNC: 22 MMOL/L — SIGNIFICANT CHANGE UP (ref 22–31)
CREAT SERPL-MCNC: 0.79 MG/DL — SIGNIFICANT CHANGE UP (ref 0.5–1.3)
EGFR: 100 ML/MIN/1.73M2 — SIGNIFICANT CHANGE UP
GAS PNL BLDV: 136 MMOL/L — SIGNIFICANT CHANGE UP (ref 136–145)
GAS PNL BLDV: SIGNIFICANT CHANGE UP
GLUCOSE BLDV-MCNC: 92 MG/DL — SIGNIFICANT CHANGE UP (ref 70–99)
GLUCOSE SERPL-MCNC: 94 MG/DL — SIGNIFICANT CHANGE UP (ref 70–99)
HCO3 BLDV-SCNC: 27 MMOL/L — SIGNIFICANT CHANGE UP (ref 22–29)
HCT VFR BLD CALC: 34.3 % — LOW (ref 34.5–45)
HCT VFR BLDA CALC: 34 % — LOW (ref 34.5–46.5)
HGB BLD CALC-MCNC: 11.4 G/DL — LOW (ref 11.5–15.5)
HGB BLD-MCNC: 10.9 G/DL — LOW (ref 11.5–15.5)
INR BLD: 1.07 RATIO — SIGNIFICANT CHANGE UP (ref 0.88–1.16)
LACTATE BLDV-MCNC: 1.5 MMOL/L — SIGNIFICANT CHANGE UP (ref 0.5–2)
MAGNESIUM SERPL-MCNC: 1.8 MG/DL — SIGNIFICANT CHANGE UP (ref 1.6–2.6)
MCHC RBC-ENTMCNC: 29.4 PG — SIGNIFICANT CHANGE UP (ref 27–34)
MCHC RBC-ENTMCNC: 31.8 GM/DL — LOW (ref 32–36)
MCV RBC AUTO: 92.5 FL — SIGNIFICANT CHANGE UP (ref 80–100)
NRBC # BLD: 0 /100 WBCS — SIGNIFICANT CHANGE UP
NRBC # FLD: 0 K/UL — SIGNIFICANT CHANGE UP
PCO2 BLDV: 45 MMHG — HIGH (ref 39–42)
PH BLDV: 7.39 — SIGNIFICANT CHANGE UP (ref 7.32–7.43)
PHOSPHATE SERPL-MCNC: 4.2 MG/DL — SIGNIFICANT CHANGE UP (ref 2.5–4.5)
PLATELET # BLD AUTO: 141 K/UL — LOW (ref 150–400)
PO2 BLDV: 51 MMHG — SIGNIFICANT CHANGE UP
POTASSIUM BLDV-SCNC: 4.6 MMOL/L — SIGNIFICANT CHANGE UP (ref 3.5–5.1)
POTASSIUM SERPL-MCNC: 4.5 MMOL/L — SIGNIFICANT CHANGE UP (ref 3.5–5.3)
POTASSIUM SERPL-SCNC: 4.5 MMOL/L — SIGNIFICANT CHANGE UP (ref 3.5–5.3)
PROLACTIN SERPL-MCNC: 3 NG/ML — LOW (ref 3.4–24.1)
PROTHROM AB SERPL-ACNC: 12.4 SEC — SIGNIFICANT CHANGE UP (ref 10.5–13.4)
RBC # BLD: 3.71 M/UL — LOW (ref 3.8–5.2)
RBC # FLD: 15.2 % — HIGH (ref 10.3–14.5)
SAO2 % BLDV: 81.3 % — SIGNIFICANT CHANGE UP
SARS-COV-2 RNA SPEC QL NAA+PROBE: SIGNIFICANT CHANGE UP
SODIUM SERPL-SCNC: 137 MMOL/L — SIGNIFICANT CHANGE UP (ref 135–145)
WBC # BLD: 4.38 K/UL — SIGNIFICANT CHANGE UP (ref 3.8–10.5)
WBC # FLD AUTO: 4.38 K/UL — SIGNIFICANT CHANGE UP (ref 3.8–10.5)

## 2022-06-21 PROCEDURE — 99232 SBSQ HOSP IP/OBS MODERATE 35: CPT

## 2022-06-21 PROCEDURE — 99233 SBSQ HOSP IP/OBS HIGH 50: CPT

## 2022-06-21 RX ORDER — OXYCODONE HYDROCHLORIDE 5 MG/1
5 TABLET ORAL ONCE
Refills: 0 | Status: DISCONTINUED | OUTPATIENT
Start: 2022-06-21 | End: 2022-06-21

## 2022-06-21 RX ADMIN — Medication 75 MICROGRAM(S): at 06:35

## 2022-06-21 RX ADMIN — QUETIAPINE FUMARATE 200 MILLIGRAM(S): 200 TABLET, FILM COATED ORAL at 06:35

## 2022-06-21 RX ADMIN — DIVALPROEX SODIUM 1000 MILLIGRAM(S): 500 TABLET, DELAYED RELEASE ORAL at 22:24

## 2022-06-21 RX ADMIN — Medication 3 MILLIGRAM(S): at 22:23

## 2022-06-21 RX ADMIN — OXYCODONE HYDROCHLORIDE 5 MILLIGRAM(S): 5 TABLET ORAL at 16:02

## 2022-06-21 RX ADMIN — Medication 100 MILLIGRAM(S): at 06:36

## 2022-06-21 RX ADMIN — OXYCODONE HYDROCHLORIDE 10 MILLIGRAM(S): 5 TABLET ORAL at 02:00

## 2022-06-21 RX ADMIN — PANTOPRAZOLE SODIUM 40 MILLIGRAM(S): 20 TABLET, DELAYED RELEASE ORAL at 06:35

## 2022-06-21 RX ADMIN — QUETIAPINE FUMARATE 50 MILLIGRAM(S): 200 TABLET, FILM COATED ORAL at 11:30

## 2022-06-21 RX ADMIN — Medication 50 MILLIGRAM(S): at 11:25

## 2022-06-21 RX ADMIN — OXYCODONE HYDROCHLORIDE 5 MILLIGRAM(S): 5 TABLET ORAL at 15:35

## 2022-06-21 RX ADMIN — OXYCODONE HYDROCHLORIDE 10 MILLIGRAM(S): 5 TABLET ORAL at 06:33

## 2022-06-21 RX ADMIN — QUETIAPINE FUMARATE 200 MILLIGRAM(S): 200 TABLET, FILM COATED ORAL at 18:11

## 2022-06-21 RX ADMIN — OXYCODONE HYDROCHLORIDE 10 MILLIGRAM(S): 5 TABLET ORAL at 01:10

## 2022-06-21 RX ADMIN — Medication 1 TABLET(S): at 11:26

## 2022-06-21 RX ADMIN — OXYCODONE HYDROCHLORIDE 10 MILLIGRAM(S): 5 TABLET ORAL at 11:25

## 2022-06-21 RX ADMIN — Medication 0.5 MILLIGRAM(S): at 06:33

## 2022-06-21 RX ADMIN — LURASIDONE HYDROCHLORIDE 40 MILLIGRAM(S): 40 TABLET ORAL at 11:26

## 2022-06-21 RX ADMIN — CHLORHEXIDINE GLUCONATE 1 APPLICATION(S): 213 SOLUTION TOPICAL at 14:00

## 2022-06-21 RX ADMIN — DIVALPROEX SODIUM 500 MILLIGRAM(S): 500 TABLET, DELAYED RELEASE ORAL at 11:27

## 2022-06-21 RX ADMIN — OXYCODONE HYDROCHLORIDE 10 MILLIGRAM(S): 5 TABLET ORAL at 22:15

## 2022-06-21 RX ADMIN — Medication 104 MILLIGRAM(S): at 11:25

## 2022-06-21 RX ADMIN — OXYCODONE HYDROCHLORIDE 5 MILLIGRAM(S): 5 TABLET ORAL at 14:35

## 2022-06-21 RX ADMIN — OXYCODONE HYDROCHLORIDE 5 MILLIGRAM(S): 5 TABLET ORAL at 15:02

## 2022-06-21 RX ADMIN — ARIPIPRAZOLE 30 MILLIGRAM(S): 15 TABLET ORAL at 13:58

## 2022-06-21 RX ADMIN — OXYCODONE HYDROCHLORIDE 10 MILLIGRAM(S): 5 TABLET ORAL at 12:25

## 2022-06-21 RX ADMIN — Medication 1 MILLIGRAM(S): at 14:53

## 2022-06-21 RX ADMIN — Medication 50 MILLIGRAM(S): at 22:10

## 2022-06-21 RX ADMIN — Medication 104 MILLIGRAM(S): at 06:43

## 2022-06-21 RX ADMIN — OXYCODONE HYDROCHLORIDE 10 MILLIGRAM(S): 5 TABLET ORAL at 23:10

## 2022-06-21 RX ADMIN — Medication 100 MILLIGRAM(S): at 13:58

## 2022-06-21 RX ADMIN — OXYCODONE HYDROCHLORIDE 10 MILLIGRAM(S): 5 TABLET ORAL at 07:33

## 2022-06-21 RX ADMIN — Medication 50 MILLIGRAM(S): at 02:10

## 2022-06-21 NOTE — PROGRESS NOTE ADULT - PROBLEM SELECTOR PLAN 4
Assessment:  - patient has hx of bipolar disorder and is currently on multiple antipsychotics: High dose seroquel, abilify, chlorpromazine ,lurasidone, tizanidine  - Qtc 460   - previous psych consult recommended continuation of all antipsychotics    Plan:  - Abilfy 30mg daily, Latuda 40mg at noon  - Depakote  daily and 1000 night   - Seroquel 200mg at 8am and 8pm and at 12PM   - Chlorpromazine 50mg @ 7a, 11AM, 8p, 11p (6/20)   - If Agitated: Chlorpromazine 50mg IM q4h prn with Ativan 2mg IM q4h prn but avoid arms   - Psych following, recs appreciated

## 2022-06-21 NOTE — PROGRESS NOTE ADULT - PROBLEM SELECTOR PLAN 8
- Hx of RLE DVT, on Eliquis 5mg BID outpatient   - s/p lovenox pending I/D procedure, now d/c today pending procedure Plt downtrending since admission; Plt 50 today   - differential includes sepsis vs hep products (low clinical suspicion) vs Depakote induced (low suspicion)   - Restart Eliquis 5mg BID after I/D

## 2022-06-21 NOTE — PROGRESS NOTE ADULT - PROBLEM SELECTOR PLAN 9
SCD now - Hx of RLE DVT, on Eliquis 5mg BID outpatient   - s/p lovenox pending I/D procedure, restart Eliquis 5mg BID

## 2022-06-21 NOTE — DISCHARGE NOTE PROVIDER - NPI NUMBER (FOR SYSADMIN USE ONLY) :
assisted to put baby to left breast in cross cradle hold position and left breast in football hold position with deep latch and baby noted to suck with stimulation/initiate/review safe skin-to-skin/initiate/review hand expression/initiate/review techniques for position and latch/initiate/review breast massage/compression/reviewed components of an effective feeding and at least 8 effective feedings per day required/reviewed importance of monitoring infant diapers, the breastfeeding log, and minimum output each day/reviewed risks of unnecessary formula supplementation/reviewed risks of artificial nipples/reviewed benefits and recommendations for rooming in/reviewed feeding on demand/by cue at least 8 times a day/reviewed indications of inadequate milk transfer that would require supplementation
[UNKNOWN]

## 2022-06-21 NOTE — PROGRESS NOTE ADULT - SUBJECTIVE AND OBJECTIVE BOX
Authored By:  Rony Walton MD  PGY-1, Internal Medicine    INTERVAL HPI/OVERNIGHT EVENTS:     SUBJECTIVE: Patient seen and examined at bedside this morning.     OBJECTIVE:      CAPILLARY BLOOD GLUCOSE      POCT Blood Glucose.: 90 mg/dL (2022 23:20)      ICU Vital Signs Last 24 Hrs  T(C): 36.5 (2022 06:00), Max: 36.9 (2022 08:34)  T(F): 97.7 (2022 06:00), Max: 98.4 (2022 08:34)  HR: 84 (2022 06:00) (77 - 134)  BP: 100/69 (2022 06:00) (100/60 - 119/94)  BP(mean): --  ABP: --  ABP(mean): --  RR: 16 (2022 06:00) (16 - 18)  SpO2: 100% (2022 06:00) (97% - 100%)    PHYSICAL EXAM:      MEDICATIONS:  MEDICATIONS  (STANDING):  ARIPiprazole 30 milliGRAM(s) Oral daily  chlorhexidine 2% Cloths 1 Application(s) Topical daily  chlorproMAZINE    IVPB 50 milliGRAM(s) IV Intermittent <User Schedule>  clindamycin IVPB 600 milliGRAM(s) IV Intermittent every 8 hours  diVALproex  milliGRAM(s) Oral daily  diVALproex ER 1000 milliGRAM(s) Oral at bedtime  levothyroxine 75 MICROGram(s) Oral daily  LORazepam     Tablet 0.5 milliGRAM(s) Oral <User Schedule>  lurasidone 40 milliGRAM(s) Oral daily  melatonin 3 milliGRAM(s) Oral at bedtime  multivitamin 1 Tablet(s) Oral daily  pantoprazole    Tablet 40 milliGRAM(s) Oral before breakfast  polyethylene glycol 3350 Oral Powder - Peds 17 Gram(s) Oral daily  QUEtiapine 200 milliGRAM(s) Oral two times a day  QUEtiapine 50 milliGRAM(s) Oral <User Schedule>    MEDICATIONS  (PRN):  acetaminophen     Tablet .. 650 milliGRAM(s) Oral every 6 hours PRN Temp greater or equal to 38C (100.4F), Mild Pain (1 - 3)  ALBUTerol    90 MICROgram(s) HFA Inhaler 2 Puff(s) Inhalation every 6 hours PRN Shortness of Breath and/or Wheezing  chlorproMAZINE    Injectable 50 milliGRAM(s) IntraMuscular every 4 hours PRN Agitation  diphenhydrAMINE 50 milliGRAM(s) Oral every 8 hours PRN Rash and/or Itching  LORazepam   Injectable 2 milliGRAM(s) IntraMuscular every 4 hours PRN Severe Agitation  oxyCODONE    IR 10 milliGRAM(s) Oral every 4 hours PRN Severe Pain (7 - 10)  oxyCODONE    IR 5 milliGRAM(s) Oral every 4 hours PRN Moderate Pain (4 - 6)      ALLERGIES:  Allergies    latex (Blisters)  penicillin (Hives)  penicillin (Other)  pineapple (Unknown)  Toradol (Rash)  Toradol (Unknown)  Zofran (Hives)  Zofran (Unknown)    Intolerances        LABS:                        10.9   4.38  )-----------( 141      ( 2022 23:42 )             34.3     Hemoglobin: 10.9 g/dL ( @ 23:42)  Hemoglobin: 10.2 g/dL ( @ 06:57)  Hemoglobin: 10.8 g/dL ( @ 12:20)  Hemoglobin: 8.1 g/dL ( @ 06:42)  Hemoglobin: 10.6 g/dL ( @ 21:52)    CBC Full  -  ( 2022 23:42 )  WBC Count : 4.38 K/uL  RBC Count : 3.71 M/uL  Hemoglobin : 10.9 g/dL  Hematocrit : 34.3 %  Platelet Count - Automated : 141 K/uL  Mean Cell Volume : 92.5 fL  Mean Cell Hemoglobin : 29.4 pg  Mean Cell Hemoglobin Concentration : 31.8 gm/dL  Auto Neutrophil # : x  Auto Lymphocyte # : x  Auto Monocyte # : x  Auto Eosinophil # : x  Auto Basophil # : x  Auto Neutrophil % : x  Auto Lymphocyte % : x  Auto Monocyte % : x  Auto Eosinophil % : x  Auto Basophil % : x        137  |  104  |  8   ----------------------------<  94  4.5   |  22  |  0.79    Ca    9.4      2022 23:42  Phos  4.2     -20  Mg     1.80     06-20    TPro  7.6  /  Alb  4.2  /  TBili  0.2  /  DBili  x   /  AST  22  /  ALT  24  /  AlkPhos  50      Creatinine Trend: 0.79<--, 0.71<--, 0.76<--, 0.89<--, 0.70<--, 0.72<--  LIVER FUNCTIONS - ( 2022 10:15 )  Alb: 4.2 g/dL / Pro: 7.6 g/dL / ALK PHOS: 50 U/L / ALT: 24 U/L / AST: 22 U/L / GGT: x           PT/INR - ( 2022 23:42 )   PT: 12.4 sec;   INR: 1.07 ratio         PTT - ( 2022 23:42 )  PTT:28.9 sec    hs Troponin:        23:42 - VBG - pH: 7.39  | pCO2: 45    | pO2: 51    | Lactate: 1.5        Urinalysis Basic - ( 2022 14:50 )    Color: Light Yellow / Appearance: Clear / S.014 / pH: x  Gluc: x / Ketone: Negative  / Bili: Negative / Urobili: <2 mg/dL   Blood: x / Protein: Trace / Nitrite: Negative   Leuk Esterase: Negative / RBC: >50 /HPF / WBC 0 /HPF   Sq Epi: x / Non Sq Epi: 2 /HPF / Bacteria: Negative      CSF:                      EKG:   MICROBIOLOGY:    IMAGING:      Labs, imaging, EKG personally reviewed    RADIOLOGY & ADDITIONAL TESTS: Reviewed. Authored By:  Rony Walton MD  PGY-1, Internal Medicine    INTERVAL HPI/OVERNIGHT EVENTS:     SUBJECTIVE: Patient seen and examined at bedside this morning. RRT called overnight for seizure like activity; see RRT note for details Patient is AOx3, does not recall event. States tat she feels anxious and would like ativan.     OBJECTIVE:      POCT Blood Glucose.: 90 mg/dL (2022 23:20)    Vital Signs Last 24 Hrs  T(C): 36.5 (2022 06:00), Max: 36.9 (2022 08:34)  T(F): 97.7 (2022 06:00), Max: 98.4 (2022 08:34)  HR: 84 (2022 06:00) (77 - 134)  BP: 100/69 (2022 06:00) (100/60 - 119/94)  BP(mean): --  ABP: --  ABP(mean): --  RR: 16 (2022 06:00) (16 - 18)  SpO2: 100% (2022 06:00) (97% - 100%)    PHYSICAL EXAM:  Gen: AAOx3, non-toxic  Head: NCAT  HEENT: EOMI, oral mucosa moist, normal conjunctiva  Lung: CTAB, no respiratory distress, no wheezes/rhonchi/rales B/L, speaking in full sentences  CV: RRR, no murmurs, rubs or gallops, trace le edema   Abd: soft, NTND, no guarding, no CVA tenderness      MEDICATIONS:  MEDICATIONS  (STANDING):  ARIPiprazole 30 milliGRAM(s) Oral daily  chlorhexidine 2% Cloths 1 Application(s) Topical daily  chlorproMAZINE    IVPB 50 milliGRAM(s) IV Intermittent <User Schedule>  clindamycin IVPB 600 milliGRAM(s) IV Intermittent every 8 hours  diVALproex  milliGRAM(s) Oral daily  diVALproex ER 1000 milliGRAM(s) Oral at bedtime  levothyroxine 75 MICROGram(s) Oral daily  LORazepam     Tablet 0.5 milliGRAM(s) Oral <User Schedule>  lurasidone 40 milliGRAM(s) Oral daily  melatonin 3 milliGRAM(s) Oral at bedtime  multivitamin 1 Tablet(s) Oral daily  pantoprazole    Tablet 40 milliGRAM(s) Oral before breakfast  polyethylene glycol 3350 Oral Powder - Peds 17 Gram(s) Oral daily  QUEtiapine 200 milliGRAM(s) Oral two times a day  QUEtiapine 50 milliGRAM(s) Oral <User Schedule>    MEDICATIONS  (PRN):  acetaminophen     Tablet .. 650 milliGRAM(s) Oral every 6 hours PRN Temp greater or equal to 38C (100.4F), Mild Pain (1 - 3)  ALBUTerol    90 MICROgram(s) HFA Inhaler 2 Puff(s) Inhalation every 6 hours PRN Shortness of Breath and/or Wheezing  chlorproMAZINE    Injectable 50 milliGRAM(s) IntraMuscular every 4 hours PRN Agitation  diphenhydrAMINE 50 milliGRAM(s) Oral every 8 hours PRN Rash and/or Itching  LORazepam   Injectable 2 milliGRAM(s) IntraMuscular every 4 hours PRN Severe Agitation  oxyCODONE    IR 10 milliGRAM(s) Oral every 4 hours PRN Severe Pain (7 - 10)  oxyCODONE    IR 5 milliGRAM(s) Oral every 4 hours PRN Moderate Pain (4 - 6)      ALLERGIES:  Allergies    latex (Blisters)  penicillin (Hives)  penicillin (Other)  pineapple (Unknown)  Toradol (Rash)  Toradol (Unknown)  Zofran (Hives)  Zofran (Unknown)    Intolerances        LABS:                        10.9   4.38  )-----------( 141      ( 2022 23:42 )             34.3     Hemoglobin: 10.9 g/dL ( @ 23:42)  Hemoglobin: 10.2 g/dL ( @ 06:57)  Hemoglobin: 10.8 g/dL ( @ 12:20)  Hemoglobin: 8.1 g/dL ( @ 06:42)  Hemoglobin: 10.6 g/dL ( @ 21:52)    CBC Full  -  ( 2022 23:42 )  WBC Count : 4.38 K/uL  RBC Count : 3.71 M/uL  Hemoglobin : 10.9 g/dL  Hematocrit : 34.3 %  Platelet Count - Automated : 141 K/uL  Mean Cell Volume : 92.5 fL  Mean Cell Hemoglobin : 29.4 pg  Mean Cell Hemoglobin Concentration : 31.8 gm/dL  Auto Neutrophil # : x  Auto Lymphocyte # : x  Auto Monocyte # : x  Auto Eosinophil # : x  Auto Basophil # : x  Auto Neutrophil % : x  Auto Lymphocyte % : x  Auto Monocyte % : x  Auto Eosinophil % : x  Auto Basophil % : x        137  |  104  |  8   ----------------------------<  94  4.5   |  22  |  0.79    Ca    9.4      2022 23:42  Phos  4.2       Mg     1.80         TPro  7.6  /  Alb  4.2  /  TBili  0.2  /  DBili  x   /  AST  22  /  ALT  24  /  AlkPhos  50      Creatinine Trend: 0.79<--, 0.71<--, 0.76<--, 0.89<--, 0.70<--, 0.72<--  LIVER FUNCTIONS - ( 2022 10:15 )  Alb: 4.2 g/dL / Pro: 7.6 g/dL / ALK PHOS: 50 U/L / ALT: 24 U/L / AST: 22 U/L / GGT: x           PT/INR - ( 2022 23:42 )   PT: 12.4 sec;   INR: 1.07 ratio         PTT - ( 2022 23:42 )  PTT:28.9 sec    hs Troponin:        23:42 - VBG - pH: 7.39  | pCO2: 45    | pO2: 51    | Lactate: 1.5        Urinalysis Basic - ( 2022 14:50 )    Color: Light Yellow / Appearance: Clear / S.014 / pH: x  Gluc: x / Ketone: Negative  / Bili: Negative / Urobili: <2 mg/dL   Blood: x / Protein: Trace / Nitrite: Negative   Leuk Esterase: Negative / RBC: >50 /HPF / WBC 0 /HPF   Sq Epi: x / Non Sq Epi: 2 /HPF / Bacteria: Negative        EKG:   MICROBIOLOGY:    IMAGING:      Labs, imaging, EKG personally reviewed    RADIOLOGY & ADDITIONAL TESTS: Reviewed.

## 2022-06-21 NOTE — PROGRESS NOTE ADULT - PROBLEM SELECTOR PLAN 7
Plt downtrending since admission; Plt 50 today   - differential includes sepsis vs hep products (low clinical suspicion) vs Depakote induced (low suspicion)   - d/c Lovenox and continue to monitor at this time   - F/U PTT/PT/INR Patient has Hx of recurrent seizure like activity; Has been evaluated by neurology in the past with negative EEG: Activity consistent with pseudoseizure   - S/p RRT for jerking movement in upper extremities (6/21); resolve without medications being given   - Low concern for seizure given presentation and prior history, continue to monitor at this time

## 2022-06-21 NOTE — RAPID RESPONSE TEAM SUMMARY - NSSITUATIONBACKGROUNDRRT_GEN_ALL_CORE
35 y/o F with pmhx of bipolar disorder, hypothyroidism, factor V Leiden on eliquis, seizures, presented to the ED for new onset L shoulder pain. The patient found to have a L deltoid abscess likely secondary to IM injections. Admit for abscess. RRT called due to nursing concern for seizure like activity. On exam, findings not consistent with true seizures. Ativan not given. Pt was maintaining airway and saturating 100% on RA. Chart reviewed and noted that patient was evaluated in past by neurology for similar pseudoseizure activity. EEG previously negative. Consider consult in am.

## 2022-06-21 NOTE — PROGRESS NOTE ADULT - PROBLEM SELECTOR PLAN 3
Assessment:  - patient unknown timeframe of LE swelling b/l; believes that it started in March     Plan:  - will get dopplers of LE to evaluate for DVT in the setting of factor V leiden  - Pro BNP w/i nl

## 2022-06-21 NOTE — PROGRESS NOTE ADULT - ASSESSMENT
37 y/o F with pmhx of bipolar disorder, hypothyroidism, factor V Leiden on eliquis, seizures, presented to the ED for new onset L shoulder pain. The patient found to have a L deltoid abscess likely secondary to IM injections. Admit for abscess. 37 y/o F with pmhx of bipolar disorder, hypothyroidism, factor V Leiden on eliquis, seizures, presented to the ED for new onset L shoulder pain. The patient found to have a L deltoid abscess likely secondary to IM injections. Admission for I/D and observation.

## 2022-06-21 NOTE — PROGRESS NOTE ADULT - PROBLEM SELECTOR PLAN 1
Assessment:  - patient presented with abscess of the L shoulder secondary to IM injection  - no leukocytosis however is tachycardic and mildly low BP  - previously had R shoulder abscess in the past  - CT L shoulder show Left upper extremity abscess closely associated with the deltoid musculature. A couple foci of subcutaneous gas along the inferior margin of the collection are most likely related to recent upper extremity injection.   - surgery consulted, no indication for intervention, negative for nec fasc   - D/c vancomycin, continue on clindamycin 600 TID IV  - On IR schedule for Tues 6/21, no need to be NPO Assessment:  - patient presented with abscess of the L shoulder secondary to IM injection  - no leukocytosis however is tachycardic and mildly low BP  - previously had R shoulder abscess in the past  - CT L shoulder show Left upper extremity abscess closely associated with the deltoid musculature. A couple foci of subcutaneous gas along the inferior margin of the collection are most likely related to recent upper extremity injection.   - surgery consulted, no indication for intervention, negative for nec fasc   - D/c vancomycin, continue on clindamycin 600 TID IV  - Pending IR procedure today

## 2022-06-22 DIAGNOSIS — E03.9 HYPOTHYROIDISM, UNSPECIFIED: ICD-10-CM

## 2022-06-22 DIAGNOSIS — H81.391 OTHER PERIPHERAL VERTIGO, RIGHT EAR: ICD-10-CM

## 2022-06-22 DIAGNOSIS — G40.909 EPILEPSY, UNSPECIFIED, NOT INTRACTABLE, WITHOUT STATUS EPILEPTICUS: ICD-10-CM

## 2022-06-22 DIAGNOSIS — F25.0 SCHIZOAFFECTIVE DISORDER, BIPOLAR TYPE: ICD-10-CM

## 2022-06-22 DIAGNOSIS — L02.413 CUTANEOUS ABSCESS OF RIGHT UPPER LIMB: ICD-10-CM

## 2022-06-22 DIAGNOSIS — D68.51 ACTIVATED PROTEIN C RESISTANCE: ICD-10-CM

## 2022-06-22 DIAGNOSIS — Z79.01 LONG TERM (CURRENT) USE OF ANTICOAGULANTS: ICD-10-CM

## 2022-06-22 DIAGNOSIS — M25.511 PAIN IN RIGHT SHOULDER: ICD-10-CM

## 2022-06-22 DIAGNOSIS — R45.1 RESTLESSNESS AND AGITATION: ICD-10-CM

## 2022-06-22 DIAGNOSIS — J45.909 UNSPECIFIED ASTHMA, UNCOMPLICATED: ICD-10-CM

## 2022-06-22 DIAGNOSIS — Z88.6 ALLERGY STATUS TO ANALGESIC AGENT: ICD-10-CM

## 2022-06-22 DIAGNOSIS — Z86.718 PERSONAL HISTORY OF OTHER VENOUS THROMBOSIS AND EMBOLISM: ICD-10-CM

## 2022-06-22 DIAGNOSIS — Z91.51 PERSONAL HISTORY OF SUICIDAL BEHAVIOR: ICD-10-CM

## 2022-06-22 DIAGNOSIS — Z88.0 ALLERGY STATUS TO PENICILLIN: ICD-10-CM

## 2022-06-22 DIAGNOSIS — Z91.040 LATEX ALLERGY STATUS: ICD-10-CM

## 2022-06-22 LAB
ALBUMIN SERPL ELPH-MCNC: 4 G/DL — SIGNIFICANT CHANGE UP (ref 3.3–5)
ALP SERPL-CCNC: 49 U/L — SIGNIFICANT CHANGE UP (ref 40–120)
ALT FLD-CCNC: 24 U/L — SIGNIFICANT CHANGE UP (ref 4–33)
ANION GAP SERPL CALC-SCNC: 12 MMOL/L — SIGNIFICANT CHANGE UP (ref 7–14)
AST SERPL-CCNC: 18 U/L — SIGNIFICANT CHANGE UP (ref 4–32)
BASOPHILS # BLD AUTO: 0.02 K/UL — SIGNIFICANT CHANGE UP (ref 0–0.2)
BASOPHILS NFR BLD AUTO: 0.4 % — SIGNIFICANT CHANGE UP (ref 0–2)
BILIRUB SERPL-MCNC: <0.2 MG/DL — SIGNIFICANT CHANGE UP (ref 0.2–1.2)
BUN SERPL-MCNC: 10 MG/DL — SIGNIFICANT CHANGE UP (ref 7–23)
CALCIUM SERPL-MCNC: 9.3 MG/DL — SIGNIFICANT CHANGE UP (ref 8.4–10.5)
CHLORIDE SERPL-SCNC: 104 MMOL/L — SIGNIFICANT CHANGE UP (ref 98–107)
CO2 SERPL-SCNC: 25 MMOL/L — SIGNIFICANT CHANGE UP (ref 22–31)
CREAT SERPL-MCNC: 0.8 MG/DL — SIGNIFICANT CHANGE UP (ref 0.5–1.3)
EGFR: 98 ML/MIN/1.73M2 — SIGNIFICANT CHANGE UP
EOSINOPHIL # BLD AUTO: 0.07 K/UL — SIGNIFICANT CHANGE UP (ref 0–0.5)
EOSINOPHIL NFR BLD AUTO: 1.5 % — SIGNIFICANT CHANGE UP (ref 0–6)
GLUCOSE SERPL-MCNC: 100 MG/DL — HIGH (ref 70–99)
HCT VFR BLD CALC: 32.6 % — LOW (ref 34.5–45)
HGB BLD-MCNC: 10.5 G/DL — LOW (ref 11.5–15.5)
IANC: 2.36 K/UL — SIGNIFICANT CHANGE UP (ref 1.8–7.4)
IMM GRANULOCYTES NFR BLD AUTO: 0.2 % — SIGNIFICANT CHANGE UP (ref 0–1.5)
LYMPHOCYTES # BLD AUTO: 1.86 K/UL — SIGNIFICANT CHANGE UP (ref 1–3.3)
LYMPHOCYTES # BLD AUTO: 39 % — SIGNIFICANT CHANGE UP (ref 13–44)
MAGNESIUM SERPL-MCNC: 1.9 MG/DL — SIGNIFICANT CHANGE UP (ref 1.6–2.6)
MCHC RBC-ENTMCNC: 30.1 PG — SIGNIFICANT CHANGE UP (ref 27–34)
MCHC RBC-ENTMCNC: 32.2 GM/DL — SIGNIFICANT CHANGE UP (ref 32–36)
MCV RBC AUTO: 93.4 FL — SIGNIFICANT CHANGE UP (ref 80–100)
MONOCYTES # BLD AUTO: 0.45 K/UL — SIGNIFICANT CHANGE UP (ref 0–0.9)
MONOCYTES NFR BLD AUTO: 9.4 % — SIGNIFICANT CHANGE UP (ref 2–14)
NEUTROPHILS # BLD AUTO: 2.36 K/UL — SIGNIFICANT CHANGE UP (ref 1.8–7.4)
NEUTROPHILS NFR BLD AUTO: 49.5 % — SIGNIFICANT CHANGE UP (ref 43–77)
NRBC # BLD: 0 /100 WBCS — SIGNIFICANT CHANGE UP
NRBC # FLD: 0 K/UL — SIGNIFICANT CHANGE UP
PHOSPHATE SERPL-MCNC: 4.1 MG/DL — SIGNIFICANT CHANGE UP (ref 2.5–4.5)
PLATELET # BLD AUTO: 146 K/UL — LOW (ref 150–400)
POTASSIUM SERPL-MCNC: 4.4 MMOL/L — SIGNIFICANT CHANGE UP (ref 3.5–5.3)
POTASSIUM SERPL-SCNC: 4.4 MMOL/L — SIGNIFICANT CHANGE UP (ref 3.5–5.3)
PROT SERPL-MCNC: 7.5 G/DL — SIGNIFICANT CHANGE UP (ref 6–8.3)
RBC # BLD: 3.49 M/UL — LOW (ref 3.8–5.2)
RBC # FLD: 15.3 % — HIGH (ref 10.3–14.5)
SODIUM SERPL-SCNC: 141 MMOL/L — SIGNIFICANT CHANGE UP (ref 135–145)
WBC # BLD: 4.77 K/UL — SIGNIFICANT CHANGE UP (ref 3.8–10.5)
WBC # FLD AUTO: 4.77 K/UL — SIGNIFICANT CHANGE UP (ref 3.8–10.5)

## 2022-06-22 PROCEDURE — 99233 SBSQ HOSP IP/OBS HIGH 50: CPT

## 2022-06-22 RX ORDER — OXYCODONE HYDROCHLORIDE 5 MG/1
5 TABLET ORAL ONCE
Refills: 0 | Status: DISCONTINUED | OUTPATIENT
Start: 2022-06-22 | End: 2022-06-22

## 2022-06-22 RX ORDER — CHLORPROMAZINE HCL 10 MG
50 TABLET ORAL
Refills: 0 | Status: DISCONTINUED | OUTPATIENT
Start: 2022-06-22 | End: 2022-07-02

## 2022-06-22 RX ORDER — MULTIVIT-MIN/FERROUS GLUCONATE 9 MG/15 ML
15 LIQUID (ML) ORAL DAILY
Refills: 0 | Status: DISCONTINUED | OUTPATIENT
Start: 2022-06-23 | End: 2022-06-30

## 2022-06-22 RX ORDER — DIPHENHYDRAMINE HCL 50 MG
25 CAPSULE ORAL EVERY 8 HOURS
Refills: 0 | Status: DISCONTINUED | OUTPATIENT
Start: 2022-06-22 | End: 2022-07-02

## 2022-06-22 RX ORDER — PANTOPRAZOLE SODIUM 20 MG/1
40 TABLET, DELAYED RELEASE ORAL DAILY
Refills: 0 | Status: DISCONTINUED | OUTPATIENT
Start: 2022-06-23 | End: 2022-06-30

## 2022-06-22 RX ADMIN — Medication 104 MILLIGRAM(S): at 06:34

## 2022-06-22 RX ADMIN — Medication 0.5 MILLIGRAM(S): at 06:35

## 2022-06-22 RX ADMIN — QUETIAPINE FUMARATE 200 MILLIGRAM(S): 200 TABLET, FILM COATED ORAL at 18:24

## 2022-06-22 RX ADMIN — OXYCODONE HYDROCHLORIDE 10 MILLIGRAM(S): 5 TABLET ORAL at 19:10

## 2022-06-22 RX ADMIN — POLYETHYLENE GLYCOL 3350 17 GRAM(S): 17 POWDER, FOR SOLUTION ORAL at 13:48

## 2022-06-22 RX ADMIN — Medication 50 MILLIGRAM(S): at 21:17

## 2022-06-22 RX ADMIN — PANTOPRAZOLE SODIUM 40 MILLIGRAM(S): 20 TABLET, DELAYED RELEASE ORAL at 06:34

## 2022-06-22 RX ADMIN — Medication 3 MILLIGRAM(S): at 21:23

## 2022-06-22 RX ADMIN — ARIPIPRAZOLE 30 MILLIGRAM(S): 15 TABLET ORAL at 13:46

## 2022-06-22 RX ADMIN — Medication 0.5 MILLIGRAM(S): at 18:23

## 2022-06-22 RX ADMIN — CHLORHEXIDINE GLUCONATE 1 APPLICATION(S): 213 SOLUTION TOPICAL at 13:49

## 2022-06-22 RX ADMIN — OXYCODONE HYDROCHLORIDE 10 MILLIGRAM(S): 5 TABLET ORAL at 07:23

## 2022-06-22 RX ADMIN — DIVALPROEX SODIUM 1000 MILLIGRAM(S): 500 TABLET, DELAYED RELEASE ORAL at 21:23

## 2022-06-22 RX ADMIN — Medication 75 MICROGRAM(S): at 06:34

## 2022-06-22 RX ADMIN — LURASIDONE HYDROCHLORIDE 40 MILLIGRAM(S): 40 TABLET ORAL at 13:46

## 2022-06-22 RX ADMIN — OXYCODONE HYDROCHLORIDE 5 MILLIGRAM(S): 5 TABLET ORAL at 00:20

## 2022-06-22 RX ADMIN — OXYCODONE HYDROCHLORIDE 10 MILLIGRAM(S): 5 TABLET ORAL at 14:21

## 2022-06-22 RX ADMIN — QUETIAPINE FUMARATE 50 MILLIGRAM(S): 200 TABLET, FILM COATED ORAL at 13:47

## 2022-06-22 RX ADMIN — OXYCODONE HYDROCHLORIDE 10 MILLIGRAM(S): 5 TABLET ORAL at 03:10

## 2022-06-22 RX ADMIN — OXYCODONE HYDROCHLORIDE 5 MILLIGRAM(S): 5 TABLET ORAL at 01:15

## 2022-06-22 RX ADMIN — OXYCODONE HYDROCHLORIDE 10 MILLIGRAM(S): 5 TABLET ORAL at 02:15

## 2022-06-22 RX ADMIN — Medication 100 MILLIGRAM(S): at 06:03

## 2022-06-22 RX ADMIN — Medication 1 TABLET(S): at 13:46

## 2022-06-22 RX ADMIN — OXYCODONE HYDROCHLORIDE 10 MILLIGRAM(S): 5 TABLET ORAL at 11:20

## 2022-06-22 RX ADMIN — DIVALPROEX SODIUM 500 MILLIGRAM(S): 500 TABLET, DELAYED RELEASE ORAL at 13:47

## 2022-06-22 RX ADMIN — QUETIAPINE FUMARATE 200 MILLIGRAM(S): 200 TABLET, FILM COATED ORAL at 08:01

## 2022-06-22 RX ADMIN — OXYCODONE HYDROCHLORIDE 10 MILLIGRAM(S): 5 TABLET ORAL at 18:23

## 2022-06-22 RX ADMIN — OXYCODONE HYDROCHLORIDE 10 MILLIGRAM(S): 5 TABLET ORAL at 10:23

## 2022-06-22 RX ADMIN — OXYCODONE HYDROCHLORIDE 10 MILLIGRAM(S): 5 TABLET ORAL at 06:34

## 2022-06-22 RX ADMIN — Medication 50 MILLIGRAM(S): at 08:00

## 2022-06-22 RX ADMIN — OXYCODONE HYDROCHLORIDE 10 MILLIGRAM(S): 5 TABLET ORAL at 15:19

## 2022-06-22 RX ADMIN — Medication 25 MILLIGRAM(S): at 15:43

## 2022-06-22 NOTE — PROGRESS NOTE ADULT - PROBLEM SELECTOR PLAN 4
Assessment:  - patient has hx of bipolar disorder and is currently on multiple antipsychotics: High dose seroquel, abilify, chlorpromazine ,lurasidone, tizanidine  - Qtc 460   - previous psych consult recommended continuation of all antipsychotics    Plan:  - Abilfy 30mg daily, Latuda 40mg at noon  - Depakote  daily and 1000 night   - Seroquel 200mg at 8am and 8pm and at 12PM   - Chlorpromazine 50mg @ 7a, 11AM, 8p, 11p (6/20)   - If Agitated: Chlorpromazine 50mg IM q4h prn with Ativan 2mg IM q4h prn but avoid arms   - Psych following, recs appreciated Assessment:  - patient has hx of bipolar disorder and is currently on multiple antipsychotics: High dose seroquel, abilify, chlorpromazine ,lurasidone, tizanidine  - Qtc 460   - previous psych consult recommended continuation of all antipsychotics    Plan:  - Abilfy 30mg daily, Latuda 40mg at noon  - Depakote  daily and 1000 night   - Seroquel 200mg at 8am and 8pm and at 12PM   - Chlorpromazine 50mg 4x daily   - If Agitated: Chlorpromazine 50mg IM q4h prn with Ativan 2mg IM q4h prn but avoid arms   - Psych following, recs appreciated

## 2022-06-22 NOTE — PROGRESS NOTE ADULT - SUBJECTIVE AND OBJECTIVE BOX
Authored By:  Rony Walton MD  PGY-1, Internal Medicine    INTERVAL HPI/OVERNIGHT EVENTS:     SUBJECTIVE: Patient seen and examined at bedside this morning.     OBJECTIVE:      CAPILLARY BLOOD GLUCOSE      POCT Blood Glucose.: 90 mg/dL (2022 23:20)      ICU Vital Signs Last 24 Hrs  T(C): 36.3 (2022 06:22), Max: 36.9 (2022 12:24)  T(F): 97.4 (2022 06:22), Max: 98.4 (2022 12:24)  HR: 73 (2022 06:22) (73 - 104)  BP: 119/80 (2022 06:22) (100/67 - 119/80)  BP(mean): --  ABP: --  ABP(mean): --  RR: 17 (2022 06:22) (17 - 18)  SpO2: 99% (2022 06:22) (98% - 100%)    PHYSICAL EXAM:      MEDICATIONS:  MEDICATIONS  (STANDING):  ARIPiprazole 30 milliGRAM(s) Oral daily  chlorhexidine 2% Cloths 1 Application(s) Topical daily  chlorproMAZINE    IVPB 50 milliGRAM(s) IV Intermittent <User Schedule>  clindamycin IVPB 600 milliGRAM(s) IV Intermittent every 8 hours  diVALproex  milliGRAM(s) Oral daily  diVALproex ER 1000 milliGRAM(s) Oral at bedtime  levothyroxine 75 MICROGram(s) Oral daily  LORazepam     Tablet 0.5 milliGRAM(s) Oral <User Schedule>  lurasidone 40 milliGRAM(s) Oral daily  melatonin 3 milliGRAM(s) Oral at bedtime  multivitamin 1 Tablet(s) Oral daily  pantoprazole    Tablet 40 milliGRAM(s) Oral before breakfast  polyethylene glycol 3350 Oral Powder - Peds 17 Gram(s) Oral daily  QUEtiapine 200 milliGRAM(s) Oral two times a day  QUEtiapine 50 milliGRAM(s) Oral <User Schedule>    MEDICATIONS  (PRN):  acetaminophen     Tablet .. 650 milliGRAM(s) Oral every 6 hours PRN Temp greater or equal to 38C (100.4F), Mild Pain (1 - 3)  ALBUTerol    90 MICROgram(s) HFA Inhaler 2 Puff(s) Inhalation every 6 hours PRN Shortness of Breath and/or Wheezing  chlorproMAZINE    Injectable 50 milliGRAM(s) IntraMuscular every 4 hours PRN Agitation  diphenhydrAMINE 50 milliGRAM(s) Oral every 8 hours PRN Rash and/or Itching  LORazepam   Injectable 2 milliGRAM(s) IntraMuscular every 4 hours PRN Severe Agitation  oxyCODONE    IR 10 milliGRAM(s) Oral every 4 hours PRN Severe Pain (7 - 10)  oxyCODONE    IR 5 milliGRAM(s) Oral every 4 hours PRN Moderate Pain (4 - 6)      ALLERGIES:  Allergies    latex (Blisters)  penicillin (Hives)  penicillin (Other)  pineapple (Unknown)  Toradol (Rash)  Toradol (Unknown)  Zofran (Hives)  Zofran (Unknown)    Intolerances        LABS:                        10.5   4.77  )-----------( 146      ( 2022 02:00 )             32.6     Hemoglobin: 10.5 g/dL ( @ 02:00)  Hemoglobin: 10.9 g/dL ( @ 23:42)  Hemoglobin: 10.2 g/dL ( @ 06:57)  Hemoglobin: 10.8 g/dL ( @ 12:20)  Hemoglobin: 8.1 g/dL ( @ 06:42)    CBC Full  -  ( 2022 02:00 )  WBC Count : 4.77 K/uL  RBC Count : 3.49 M/uL  Hemoglobin : 10.5 g/dL  Hematocrit : 32.6 %  Platelet Count - Automated : 146 K/uL  Mean Cell Volume : 93.4 fL  Mean Cell Hemoglobin : 30.1 pg  Mean Cell Hemoglobin Concentration : 32.2 gm/dL  Auto Neutrophil # : 2.36 K/uL  Auto Lymphocyte # : 1.86 K/uL  Auto Monocyte # : 0.45 K/uL  Auto Eosinophil # : 0.07 K/uL  Auto Basophil # : 0.02 K/uL  Auto Neutrophil % : 49.5 %  Auto Lymphocyte % : 39.0 %  Auto Monocyte % : 9.4 %  Auto Eosinophil % : 1.5 %  Auto Basophil % : 0.4 %        141  |  104  |  10  ----------------------------<  100<H>  4.4   |  25  |  0.80    Ca    9.3      2022 02:00  Phos  4.1     06-  Mg     1.90     06-    TPro  7.5  /  Alb  4.0  /  TBili  <0.2  /  DBili  x   /  AST  18  /  ALT  24  /  AlkPhos  49  06-    Creatinine Trend: 0.80<--, 0.79<--, 0.71<--, 0.76<--, 0.89<--, 0.70<--  LIVER FUNCTIONS - ( 2022 02:00 )  Alb: 4.0 g/dL / Pro: 7.5 g/dL / ALK PHOS: 49 U/L / ALT: 24 U/L / AST: 18 U/L / GGT: x           PT/INR - ( 2022 23:42 )   PT: 12.4 sec;   INR: 1.07 ratio         PTT - ( 2022 23:42 )  PTT:28.9 sec    hs Troponin:            Urinalysis Basic - ( 2022 14:50 )    Color: Light Yellow / Appearance: Clear / S.014 / pH: x  Gluc: x / Ketone: Negative  / Bili: Negative / Urobili: <2 mg/dL   Blood: x / Protein: Trace / Nitrite: Negative   Leuk Esterase: Negative / RBC: >50 /HPF / WBC 0 /HPF   Sq Epi: x / Non Sq Epi: 2 /HPF / Bacteria: Negative      CSF:                      EKG:   MICROBIOLOGY:    IMAGING:      Labs, imaging, EKG personally reviewed    RADIOLOGY & ADDITIONAL TESTS: Reviewed. Authored By:  Rony Walton MD  PGY-1, Internal Medicine    INTERVAL HPI/OVERNIGHT EVENTS:     SUBJECTIVE: Patient seen and examined at bedside this morning. No acute events overnight. Endorsing R shoulder pain.. ROS negative otherwise.     OBJECTIVE:    POCT Blood Glucose.: 90 mg/dL (2022 23:20)      Vital Signs Last 24 Hrs  T(C): 36.3 (2022 06:22), Max: 36.9 (2022 12:24)  T(F): 97.4 (2022 06:22), Max: 98.4 (2022 12:24)  HR: 73 (2022 06:22) (73 - 104)  BP: 119/80 (2022 06:22) (100/67 - 119/80)  BP(mean): --  ABP: --  ABP(mean): --  RR: 17 (2022 06:22) (17 - 18)  SpO2: 99% (2022 06:22) (98% - 100%)    PHYSICAL EXAM:  Gen: AAOx3, non-toxic  Head: NCAT  HEENT: EOMI, oral mucosa moist, normal conjunctiva  Lung: CTAB, no respiratory distress, no wheezes/rhonchi/rales B/L, speaking in full sentences  CV: RRR, no murmurs, rubs or gallops, trace edema bilaterally   Abd: soft, NTND, no guarding, no CVA tenderness  MSK: R shoulder erythema/fluctuance unchanged       MEDICATIONS:  MEDICATIONS  (STANDING):  ARIPiprazole 30 milliGRAM(s) Oral daily  chlorhexidine 2% Cloths 1 Application(s) Topical daily  chlorproMAZINE    IVPB 50 milliGRAM(s) IV Intermittent <User Schedule>  clindamycin IVPB 600 milliGRAM(s) IV Intermittent every 8 hours  diVALproex  milliGRAM(s) Oral daily  diVALproex ER 1000 milliGRAM(s) Oral at bedtime  levothyroxine 75 MICROGram(s) Oral daily  LORazepam     Tablet 0.5 milliGRAM(s) Oral <User Schedule>  lurasidone 40 milliGRAM(s) Oral daily  melatonin 3 milliGRAM(s) Oral at bedtime  multivitamin 1 Tablet(s) Oral daily  pantoprazole    Tablet 40 milliGRAM(s) Oral before breakfast  polyethylene glycol 3350 Oral Powder - Peds 17 Gram(s) Oral daily  QUEtiapine 200 milliGRAM(s) Oral two times a day  QUEtiapine 50 milliGRAM(s) Oral <User Schedule>    MEDICATIONS  (PRN):  acetaminophen     Tablet .. 650 milliGRAM(s) Oral every 6 hours PRN Temp greater or equal to 38C (100.4F), Mild Pain (1 - 3)  ALBUTerol    90 MICROgram(s) HFA Inhaler 2 Puff(s) Inhalation every 6 hours PRN Shortness of Breath and/or Wheezing  chlorproMAZINE    Injectable 50 milliGRAM(s) IntraMuscular every 4 hours PRN Agitation  diphenhydrAMINE 50 milliGRAM(s) Oral every 8 hours PRN Rash and/or Itching  LORazepam   Injectable 2 milliGRAM(s) IntraMuscular every 4 hours PRN Severe Agitation  oxyCODONE    IR 10 milliGRAM(s) Oral every 4 hours PRN Severe Pain (7 - 10)  oxyCODONE    IR 5 milliGRAM(s) Oral every 4 hours PRN Moderate Pain (4 - 6)      ALLERGIES:  Allergies    latex (Blisters)  penicillin (Hives)  penicillin (Other)  pineapple (Unknown)  Toradol (Rash)  Toradol (Unknown)  Zofran (Hives)  Zofran (Unknown)    Intolerances        LABS:                        10.5   4.77  )-----------( 146      ( 2022 02:00 )             32.6     Hemoglobin: 10.5 g/dL ( @ 02:00)  Hemoglobin: 10.9 g/dL ( @ 23:42)  Hemoglobin: 10.2 g/dL ( @ 06:57)  Hemoglobin: 10.8 g/dL ( @ 12:20)  Hemoglobin: 8.1 g/dL ( @ 06:42)    CBC Full  -  ( 2022 02:00 )  WBC Count : 4.77 K/uL  RBC Count : 3.49 M/uL  Hemoglobin : 10.5 g/dL  Hematocrit : 32.6 %  Platelet Count - Automated : 146 K/uL  Mean Cell Volume : 93.4 fL  Mean Cell Hemoglobin : 30.1 pg  Mean Cell Hemoglobin Concentration : 32.2 gm/dL  Auto Neutrophil # : 2.36 K/uL  Auto Lymphocyte # : 1.86 K/uL  Auto Monocyte # : 0.45 K/uL  Auto Eosinophil # : 0.07 K/uL  Auto Basophil # : 0.02 K/uL  Auto Neutrophil % : 49.5 %  Auto Lymphocyte % : 39.0 %  Auto Monocyte % : 9.4 %  Auto Eosinophil % : 1.5 %  Auto Basophil % : 0.4 %        141  |  104  |  10  ----------------------------<  100<H>  4.4   |  25  |  0.80    Ca    9.3      2022 02:00  Phos  4.1       Mg     1.90         TPro  7.5  /  Alb  4.0  /  TBili  <0.2  /  DBili  x   /  AST  18  /  ALT  24  /  AlkPhos  49      Creatinine Trend: 0.80<--, 0.79<--, 0.71<--, 0.76<--, 0.89<--, 0.70<--  LIVER FUNCTIONS - ( 2022 02:00 )  Alb: 4.0 g/dL / Pro: 7.5 g/dL / ALK PHOS: 49 U/L / ALT: 24 U/L / AST: 18 U/L / GGT: x           PT/INR - ( 2022 23:42 )   PT: 12.4 sec;   INR: 1.07 ratio         PTT - ( 2022 23:42 )  PTT:28.9 sec      Urinalysis Basic - ( 2022 14:50 )    Color: Light Yellow / Appearance: Clear / S.014 / pH: x  Gluc: x / Ketone: Negative  / Bili: Negative / Urobili: <2 mg/dL   Blood: x / Protein: Trace / Nitrite: Negative   Leuk Esterase: Negative / RBC: >50 /HPF / WBC 0 /HPF   Sq Epi: x / Non Sq Epi: 2 /HPF / Bacteria: Negative        EKG:   MICROBIOLOGY:    IMAGING:      Labs, imaging, EKG personally reviewed    RADIOLOGY & ADDITIONAL TESTS: Reviewed.

## 2022-06-22 NOTE — PROGRESS NOTE ADULT - PROBLEM SELECTOR PLAN 8
Plt downtrending since admission; Plt 50 today   - differential includes sepsis vs hep products (low clinical suspicion) vs Depakote induced (low suspicion)   - Restart Eliquis 5mg BID after I/D RESOLVED  Plt downtrending since admission; plt now stable 2/2 lab error   - differential includes sepsis vs hep products (low clinical suspicion) vs Depakote induced (low suspicion)   - Restart Eliquis 5mg BID after I/D

## 2022-06-22 NOTE — PROGRESS NOTE ADULT - ASSESSMENT
37 y/o F with pmhx of bipolar disorder, hypothyroidism, factor V Leiden on eliquis, seizures, presented to the ED for new onset L shoulder pain. The patient found to have a L deltoid abscess likely secondary to IM injections. Admission for I/D and observation.

## 2022-06-22 NOTE — PROGRESS NOTE ADULT - PROBLEM SELECTOR PLAN 9
- Hx of RLE DVT, on Eliquis 5mg BID outpatient   - s/p lovenox pending I/D procedure, restart Eliquis 5mg BID

## 2022-06-22 NOTE — PROGRESS NOTE ADULT - PROBLEM SELECTOR PLAN 1
Good Assessment:  - patient presented with abscess of the L shoulder secondary to IM injection  - no leukocytosis however is tachycardic and mildly low BP  - previously had R shoulder abscess in the past  - CT L shoulder show Left upper extremity abscess closely associated with the deltoid musculature. A couple foci of subcutaneous gas along the inferior margin of the collection are most likely related to recent upper extremity injection.   - surgery consulted, no indication for intervention, negative for nec fasc   - D/c vancomycin, continue on clindamycin 600 TID IV  - Pending IR procedure today Assessment:  - patient presented with abscess of the L shoulder secondary to IM injection  - no leukocytosis however is tachycardic and mildly low BP  - previously had R shoulder abscess in the past  - CT L shoulder show Left upper extremity abscess closely associated with the deltoid musculature. A couple foci of subcutaneous gas along the inferior margin of the collection are most likely related to recent upper extremity injection.   - surgery consulted, no indication for intervention, negative for nec fasc   - D/c vancomycin, continue on clindamycin 600 TID IV  - Pending IR procedure today; was canceled on 6/21 due to inability to obtain consent from legal guardian (called without answer per IR); If unable to contact legal guardian , sister is next of kin t consent for patient **

## 2022-06-22 NOTE — PROGRESS NOTE ADULT - PROBLEM SELECTOR PLAN 7
Patient has Hx of recurrent seizure like activity; Has been evaluated by neurology in the past with negative EEG: Activity consistent with pseudoseizure   - S/p RRT for jerking movement in upper extremities (6/21); resolve without medications being given   - Low concern for seizure given presentation and prior history, continue to monitor at this time

## 2022-06-23 LAB
CULTURE RESULTS: SIGNIFICANT CHANGE UP
CULTURE RESULTS: SIGNIFICANT CHANGE UP
HCG SERPL-ACNC: <5 MIU/ML — SIGNIFICANT CHANGE UP
HCG UR QL: NEGATIVE — SIGNIFICANT CHANGE UP
SPECIMEN SOURCE: SIGNIFICANT CHANGE UP
SPECIMEN SOURCE: SIGNIFICANT CHANGE UP

## 2022-06-23 PROCEDURE — 76882 US LMTD JT/FCL EVL NVASC XTR: CPT | Mod: 26,LT

## 2022-06-23 PROCEDURE — 99233 SBSQ HOSP IP/OBS HIGH 50: CPT

## 2022-06-23 RX ORDER — ACETAMINOPHEN 500 MG
1000 TABLET ORAL ONCE
Refills: 0 | Status: COMPLETED | OUTPATIENT
Start: 2022-06-23 | End: 2022-06-23

## 2022-06-23 RX ORDER — DIPHENHYDRAMINE HCL 50 MG
25 CAPSULE ORAL ONCE
Refills: 0 | Status: COMPLETED | OUTPATIENT
Start: 2022-06-23 | End: 2022-06-23

## 2022-06-23 RX ORDER — SODIUM CHLORIDE 9 MG/ML
1000 INJECTION, SOLUTION INTRAVENOUS ONCE
Refills: 0 | Status: DISCONTINUED | OUTPATIENT
Start: 2022-06-23 | End: 2022-06-23

## 2022-06-23 RX ADMIN — Medication 3 MILLIGRAM(S): at 23:36

## 2022-06-23 RX ADMIN — QUETIAPINE FUMARATE 200 MILLIGRAM(S): 200 TABLET, FILM COATED ORAL at 18:50

## 2022-06-23 RX ADMIN — Medication 25 MILLIGRAM(S): at 12:03

## 2022-06-23 RX ADMIN — OXYCODONE HYDROCHLORIDE 10 MILLIGRAM(S): 5 TABLET ORAL at 07:40

## 2022-06-23 RX ADMIN — CHLORHEXIDINE GLUCONATE 1 APPLICATION(S): 213 SOLUTION TOPICAL at 12:07

## 2022-06-23 RX ADMIN — OXYCODONE HYDROCHLORIDE 10 MILLIGRAM(S): 5 TABLET ORAL at 10:55

## 2022-06-23 RX ADMIN — DIVALPROEX SODIUM 500 MILLIGRAM(S): 500 TABLET, DELAYED RELEASE ORAL at 12:04

## 2022-06-23 RX ADMIN — ARIPIPRAZOLE 30 MILLIGRAM(S): 15 TABLET ORAL at 12:04

## 2022-06-23 RX ADMIN — Medication 0.5 MILLIGRAM(S): at 06:05

## 2022-06-23 RX ADMIN — Medication 50 MILLIGRAM(S): at 01:56

## 2022-06-23 RX ADMIN — Medication 50 MILLIGRAM(S): at 23:36

## 2022-06-23 RX ADMIN — OXYCODONE HYDROCHLORIDE 10 MILLIGRAM(S): 5 TABLET ORAL at 22:01

## 2022-06-23 RX ADMIN — OXYCODONE HYDROCHLORIDE 10 MILLIGRAM(S): 5 TABLET ORAL at 06:40

## 2022-06-23 RX ADMIN — DIVALPROEX SODIUM 1000 MILLIGRAM(S): 500 TABLET, DELAYED RELEASE ORAL at 21:54

## 2022-06-23 RX ADMIN — Medication 50 MILLIGRAM(S): at 12:03

## 2022-06-23 RX ADMIN — Medication 100 MILLIGRAM(S): at 13:19

## 2022-06-23 RX ADMIN — OXYCODONE HYDROCHLORIDE 10 MILLIGRAM(S): 5 TABLET ORAL at 15:43

## 2022-06-23 RX ADMIN — Medication 25 MILLIGRAM(S): at 01:56

## 2022-06-23 RX ADMIN — OXYCODONE HYDROCHLORIDE 10 MILLIGRAM(S): 5 TABLET ORAL at 21:01

## 2022-06-23 RX ADMIN — POLYETHYLENE GLYCOL 3350 17 GRAM(S): 17 POWDER, FOR SOLUTION ORAL at 12:05

## 2022-06-23 RX ADMIN — QUETIAPINE FUMARATE 50 MILLIGRAM(S): 200 TABLET, FILM COATED ORAL at 12:06

## 2022-06-23 RX ADMIN — Medication 100 MILLIGRAM(S): at 01:56

## 2022-06-23 RX ADMIN — OXYCODONE HYDROCHLORIDE 10 MILLIGRAM(S): 5 TABLET ORAL at 11:30

## 2022-06-23 RX ADMIN — OXYCODONE HYDROCHLORIDE 10 MILLIGRAM(S): 5 TABLET ORAL at 01:21

## 2022-06-23 RX ADMIN — OXYCODONE HYDROCHLORIDE 10 MILLIGRAM(S): 5 TABLET ORAL at 00:21

## 2022-06-23 RX ADMIN — Medication 25 MILLIGRAM(S): at 21:54

## 2022-06-23 RX ADMIN — Medication 50 MILLIGRAM(S): at 21:53

## 2022-06-23 RX ADMIN — Medication 15 MILLILITER(S): at 12:06

## 2022-06-23 RX ADMIN — Medication 0.5 MILLIGRAM(S): at 15:43

## 2022-06-23 RX ADMIN — Medication 75 MICROGRAM(S): at 06:05

## 2022-06-23 RX ADMIN — Medication 100 MILLIGRAM(S): at 08:54

## 2022-06-23 RX ADMIN — LURASIDONE HYDROCHLORIDE 40 MILLIGRAM(S): 40 TABLET ORAL at 12:05

## 2022-06-23 RX ADMIN — QUETIAPINE FUMARATE 200 MILLIGRAM(S): 200 TABLET, FILM COATED ORAL at 06:40

## 2022-06-23 RX ADMIN — Medication 400 MILLIGRAM(S): at 17:43

## 2022-06-23 RX ADMIN — Medication 50 MILLIGRAM(S): at 06:05

## 2022-06-23 RX ADMIN — Medication 100 MILLIGRAM(S): at 21:54

## 2022-06-23 NOTE — PROGRESS NOTE ADULT - SUBJECTIVE AND OBJECTIVE BOX
Authored By:  Rony Walton MD  PGY-1, Internal Medicine    INTERVAL HPI/OVERNIGHT EVENTS:     SUBJECTIVE: Patient seen and examined at bedside this morning.     OBJECTIVE:      CAPILLARY BLOOD GLUCOSE          ICU Vital Signs Last 24 Hrs  T(C): 36.8 (23 Jun 2022 05:55), Max: 36.8 (23 Jun 2022 00:03)  T(F): 98.3 (23 Jun 2022 05:55), Max: 98.3 (23 Jun 2022 00:03)  HR: 91 (23 Jun 2022 06:04) (61 - 100)  BP: 101/69 (23 Jun 2022 06:04) (96/67 - 118/75)  BP(mean): --  ABP: --  ABP(mean): --  RR: 16 (23 Jun 2022 05:55) (16 - 18)  SpO2: 97% (23 Jun 2022 05:55) (97% - 100%)    PHYSICAL EXAM:      MEDICATIONS:  MEDICATIONS  (STANDING):  ARIPiprazole 30 milliGRAM(s) Oral daily  chlorhexidine 2% Cloths 1 Application(s) Topical daily  chlorproMAZINE    Tablet 50 milliGRAM(s) Oral <User Schedule>  clindamycin IVPB 600 milliGRAM(s) IV Intermittent every 8 hours  diVALproex  milliGRAM(s) Oral daily  diVALproex ER 1000 milliGRAM(s) Oral at bedtime  levothyroxine 75 MICROGram(s) Oral daily  LORazepam     Tablet 0.5 milliGRAM(s) Oral <User Schedule>  lurasidone 40 milliGRAM(s) Oral daily  melatonin 3 milliGRAM(s) Oral at bedtime  multivitamin/minerals/iron Oral Solution (CENTRUM) 15 milliLiter(s) Oral daily  pantoprazole   Suspension 40 milliGRAM(s) Oral daily  polyethylene glycol 3350 Oral Powder - Peds 17 Gram(s) Oral daily  QUEtiapine 200 milliGRAM(s) Oral two times a day  QUEtiapine 50 milliGRAM(s) Oral <User Schedule>    MEDICATIONS  (PRN):  acetaminophen     Tablet .. 650 milliGRAM(s) Oral every 6 hours PRN Temp greater or equal to 38C (100.4F), Mild Pain (1 - 3)  ALBUTerol    90 MICROgram(s) HFA Inhaler 2 Puff(s) Inhalation every 6 hours PRN Shortness of Breath and/or Wheezing  chlorproMAZINE    Injectable 50 milliGRAM(s) IntraMuscular every 4 hours PRN Agitation  diphenhydrAMINE Elixir 25 milliGRAM(s) Oral every 8 hours PRN Itching  LORazepam   Injectable 2 milliGRAM(s) IntraMuscular every 4 hours PRN Severe Agitation  oxyCODONE    IR 10 milliGRAM(s) Oral every 4 hours PRN Severe Pain (7 - 10)  oxyCODONE    IR 5 milliGRAM(s) Oral every 4 hours PRN Moderate Pain (4 - 6)      ALLERGIES:  Allergies    latex (Blisters)  penicillin (Hives)  penicillin (Other)  pineapple (Unknown)  Toradol (Rash)  Toradol (Unknown)  Zofran (Hives)  Zofran (Unknown)    Intolerances        LABS:                        10.5   4.77  )-----------( 146      ( 22 Jun 2022 02:00 )             32.6     Hemoglobin: 10.5 g/dL (06-22 @ 02:00)  Hemoglobin: 10.9 g/dL (06-20 @ 23:42)  Hemoglobin: 10.2 g/dL (06-20 @ 06:57)  Hemoglobin: 10.8 g/dL (06-19 @ 12:20)  Hemoglobin: 8.1 g/dL (06-19 @ 06:42)    CBC Full  -  ( 22 Jun 2022 02:00 )  WBC Count : 4.77 K/uL  RBC Count : 3.49 M/uL  Hemoglobin : 10.5 g/dL  Hematocrit : 32.6 %  Platelet Count - Automated : 146 K/uL  Mean Cell Volume : 93.4 fL  Mean Cell Hemoglobin : 30.1 pg  Mean Cell Hemoglobin Concentration : 32.2 gm/dL  Auto Neutrophil # : 2.36 K/uL  Auto Lymphocyte # : 1.86 K/uL  Auto Monocyte # : 0.45 K/uL  Auto Eosinophil # : 0.07 K/uL  Auto Basophil # : 0.02 K/uL  Auto Neutrophil % : 49.5 %  Auto Lymphocyte % : 39.0 %  Auto Monocyte % : 9.4 %  Auto Eosinophil % : 1.5 %  Auto Basophil % : 0.4 %    06-22    141  |  104  |  10  ----------------------------<  100<H>  4.4   |  25  |  0.80    Ca    9.3      22 Jun 2022 02:00  Phos  4.1     06-22  Mg     1.90     06-22    TPro  7.5  /  Alb  4.0  /  TBili  <0.2  /  DBili  x   /  AST  18  /  ALT  24  /  AlkPhos  49  06-22    Creatinine Trend: 0.80<--, 0.79<--, 0.71<--, 0.76<--, 0.89<--, 0.70<--  LIVER FUNCTIONS - ( 22 Jun 2022 02:00 )  Alb: 4.0 g/dL / Pro: 7.5 g/dL / ALK PHOS: 49 U/L / ALT: 24 U/L / AST: 18 U/L / GGT: x               hs Troponin:              CSF:                      EKG:   MICROBIOLOGY:    IMAGING:      Labs, imaging, EKG personally reviewed    RADIOLOGY & ADDITIONAL TESTS: Reviewed. Authored By:  Rony Walton MD  PGY-1, Internal Medicine    INTERVAL HPI/OVERNIGHT EVENTS:     SUBJECTIVE: Patient seen and examined at bedside this morning. No acute events overnight. ROS negative.     OBJECTIVE:      Vital Signs Last 24 Hrs  T(C): 36.8 (23 Jun 2022 05:55), Max: 36.8 (23 Jun 2022 00:03)  T(F): 98.3 (23 Jun 2022 05:55), Max: 98.3 (23 Jun 2022 00:03)  HR: 91 (23 Jun 2022 06:04) (61 - 100)  BP: 101/69 (23 Jun 2022 06:04) (96/67 - 118/75)  BP(mean): --  ABP: --  ABP(mean): --  RR: 16 (23 Jun 2022 05:55) (16 - 18)  SpO2: 97% (23 Jun 2022 05:55) (97% - 100%)    PHYSICAL EXAM:  Gen: AAOx3, non-toxic  Head: NCAT  HEENT: EOMI, oral mucosa moist, normal conjunctiva  Lung: CTAB, no respiratory distress, no wheezes/rhonchi/rales B/L, speaking in full sentences  CV: RRR, no murmurs, rubs or gallops  Abd: soft, NTND, no guarding, no CVA tenderness  MSK: R arm induration, size unchanged       MEDICATIONS:  MEDICATIONS  (STANDING):  ARIPiprazole 30 milliGRAM(s) Oral daily  chlorhexidine 2% Cloths 1 Application(s) Topical daily  chlorproMAZINE    Tablet 50 milliGRAM(s) Oral <User Schedule>  clindamycin IVPB 600 milliGRAM(s) IV Intermittent every 8 hours  diVALproex  milliGRAM(s) Oral daily  diVALproex ER 1000 milliGRAM(s) Oral at bedtime  levothyroxine 75 MICROGram(s) Oral daily  LORazepam     Tablet 0.5 milliGRAM(s) Oral <User Schedule>  lurasidone 40 milliGRAM(s) Oral daily  melatonin 3 milliGRAM(s) Oral at bedtime  multivitamin/minerals/iron Oral Solution (CENTRUM) 15 milliLiter(s) Oral daily  pantoprazole   Suspension 40 milliGRAM(s) Oral daily  polyethylene glycol 3350 Oral Powder - Peds 17 Gram(s) Oral daily  QUEtiapine 200 milliGRAM(s) Oral two times a day  QUEtiapine 50 milliGRAM(s) Oral <User Schedule>    MEDICATIONS  (PRN):  acetaminophen     Tablet .. 650 milliGRAM(s) Oral every 6 hours PRN Temp greater or equal to 38C (100.4F), Mild Pain (1 - 3)  ALBUTerol    90 MICROgram(s) HFA Inhaler 2 Puff(s) Inhalation every 6 hours PRN Shortness of Breath and/or Wheezing  chlorproMAZINE    Injectable 50 milliGRAM(s) IntraMuscular every 4 hours PRN Agitation  diphenhydrAMINE Elixir 25 milliGRAM(s) Oral every 8 hours PRN Itching  LORazepam   Injectable 2 milliGRAM(s) IntraMuscular every 4 hours PRN Severe Agitation  oxyCODONE    IR 10 milliGRAM(s) Oral every 4 hours PRN Severe Pain (7 - 10)  oxyCODONE    IR 5 milliGRAM(s) Oral every 4 hours PRN Moderate Pain (4 - 6)      ALLERGIES:  Allergies    latex (Blisters)  penicillin (Hives)  penicillin (Other)  pineapple (Unknown)  Toradol (Rash)  Toradol (Unknown)  Zofran (Hives)  Zofran (Unknown)    Intolerances        LABS:                        10.5   4.77  )-----------( 146      ( 22 Jun 2022 02:00 )             32.6     Hemoglobin: 10.5 g/dL (06-22 @ 02:00)  Hemoglobin: 10.9 g/dL (06-20 @ 23:42)  Hemoglobin: 10.2 g/dL (06-20 @ 06:57)  Hemoglobin: 10.8 g/dL (06-19 @ 12:20)  Hemoglobin: 8.1 g/dL (06-19 @ 06:42)    CBC Full  -  ( 22 Jun 2022 02:00 )  WBC Count : 4.77 K/uL  RBC Count : 3.49 M/uL  Hemoglobin : 10.5 g/dL  Hematocrit : 32.6 %  Platelet Count - Automated : 146 K/uL  Mean Cell Volume : 93.4 fL  Mean Cell Hemoglobin : 30.1 pg  Mean Cell Hemoglobin Concentration : 32.2 gm/dL  Auto Neutrophil # : 2.36 K/uL  Auto Lymphocyte # : 1.86 K/uL  Auto Monocyte # : 0.45 K/uL  Auto Eosinophil # : 0.07 K/uL  Auto Basophil # : 0.02 K/uL  Auto Neutrophil % : 49.5 %  Auto Lymphocyte % : 39.0 %  Auto Monocyte % : 9.4 %  Auto Eosinophil % : 1.5 %  Auto Basophil % : 0.4 %    06-22    141  |  104  |  10  ----------------------------<  100<H>  4.4   |  25  |  0.80    Ca    9.3      22 Jun 2022 02:00  Phos  4.1     06-22  Mg     1.90     06-22    TPro  7.5  /  Alb  4.0  /  TBili  <0.2  /  DBili  x   /  AST  18  /  ALT  24  /  AlkPhos  49  06-22    Creatinine Trend: 0.80<--, 0.79<--, 0.71<--, 0.76<--, 0.89<--, 0.70<--  LIVER FUNCTIONS - ( 22 Jun 2022 02:00 )  Alb: 4.0 g/dL / Pro: 7.5 g/dL / ALK PHOS: 49 U/L / ALT: 24 U/L / AST: 18 U/L / GGT: x               EKG:   MICROBIOLOGY:    IMAGING:      Labs, imaging, EKG personally reviewed    RADIOLOGY & ADDITIONAL TESTS: Reviewed.

## 2022-06-23 NOTE — PROGRESS NOTE ADULT - PROBLEM SELECTOR PLAN 4
Assessment:  - patient has hx of bipolar disorder and is currently on multiple antipsychotics: High dose seroquel, abilify, chlorpromazine ,lurasidone, tizanidine  - Qtc 460   - previous psych consult recommended continuation of all antipsychotics    Plan:  - Abilfy 30mg daily, Latuda 40mg at noon  - Depakote  daily and 1000 night   - Seroquel 200mg at 8am and 8pm and at 12PM   - Chlorpromazine 50mg 4x daily   - If Agitated: Chlorpromazine 50mg IM q4h prn with Ativan 2mg IM q4h prn but avoid arms   - Psych following, recs appreciated

## 2022-06-23 NOTE — PROGRESS NOTE ADULT - PROBLEM SELECTOR PLAN 1
Assessment:  - patient presented with abscess of the L shoulder secondary to IM injection  - no leukocytosis however is tachycardic and mildly low BP  - previously had R shoulder abscess in the past  - CT L shoulder show Left upper extremity abscess closely associated with the deltoid musculature. A couple foci of subcutaneous gas along the inferior margin of the collection are most likely related to recent upper extremity injection.   - surgery consulted, no indication for intervention, negative for nec fasc   - D/c vancomycin, continue on clindamycin 600 TID IV  - Pending IR procedure today; was canceled on 6/21 due to inability to obtain consent from legal guardian (called without answer per IR); If unable to contact legal guardian , sister is next of kin t consent for patient ** Assessment:  - patient presented with abscess of the L shoulder secondary to IM injection  - no leukocytosis however is tachycardic and mildly low BP  - previously had R shoulder abscess in the past  - CT L shoulder show Left upper extremity abscess closely associated with the deltoid musculature. A couple foci of subcutaneous gas along the inferior margin of the collection are most likely related to recent upper extremity injection.   - surgery consulted, no indication for intervention, negative for nec fasc   - D/c vancomycin, continue on clindamycin 600 TID IV  - Ultrasound demonstrating R arm abscess   - Legal consent to be obtained from brother (Bill); number in provider Handoff  - To have I/D under sedation in the AM per IR, NPO at midnight

## 2022-06-23 NOTE — PROGRESS NOTE ADULT - PROBLEM SELECTOR PLAN 8
RESOLVED  Plt downtrending since admission; plt now stable 2/2 lab error   - differential includes sepsis vs hep products (low clinical suspicion) vs Depakote induced (low suspicion)   - Restart Eliquis 5mg BID after I/D

## 2022-06-24 PROCEDURE — 99233 SBSQ HOSP IP/OBS HIGH 50: CPT

## 2022-06-24 PROCEDURE — 99222 1ST HOSP IP/OBS MODERATE 55: CPT

## 2022-06-24 RX ORDER — ACETAMINOPHEN 500 MG
750 TABLET ORAL ONCE
Refills: 0 | Status: COMPLETED | OUTPATIENT
Start: 2022-06-24 | End: 2022-06-24

## 2022-06-24 RX ORDER — ENOXAPARIN SODIUM 100 MG/ML
120 INJECTION SUBCUTANEOUS EVERY 12 HOURS
Refills: 0 | Status: COMPLETED | OUTPATIENT
Start: 2022-06-24 | End: 2022-06-28

## 2022-06-24 RX ORDER — VANCOMYCIN HCL 1 G
VIAL (EA) INTRAVENOUS
Refills: 0 | Status: DISCONTINUED | OUTPATIENT
Start: 2022-06-24 | End: 2022-06-25

## 2022-06-24 RX ORDER — VANCOMYCIN HCL 1 G
1250 VIAL (EA) INTRAVENOUS EVERY 12 HOURS
Refills: 0 | Status: DISCONTINUED | OUTPATIENT
Start: 2022-06-25 | End: 2022-06-25

## 2022-06-24 RX ORDER — VANCOMYCIN HCL 1 G
1250 VIAL (EA) INTRAVENOUS ONCE
Refills: 0 | Status: COMPLETED | OUTPATIENT
Start: 2022-06-24 | End: 2022-06-24

## 2022-06-24 RX ORDER — APIXABAN 2.5 MG/1
5 TABLET, FILM COATED ORAL EVERY 12 HOURS
Refills: 0 | Status: DISCONTINUED | OUTPATIENT
Start: 2022-06-24 | End: 2022-06-24

## 2022-06-24 RX ADMIN — OXYCODONE HYDROCHLORIDE 10 MILLIGRAM(S): 5 TABLET ORAL at 17:30

## 2022-06-24 RX ADMIN — OXYCODONE HYDROCHLORIDE 10 MILLIGRAM(S): 5 TABLET ORAL at 09:00

## 2022-06-24 RX ADMIN — Medication 50 MILLIGRAM(S): at 07:00

## 2022-06-24 RX ADMIN — DIVALPROEX SODIUM 500 MILLIGRAM(S): 500 TABLET, DELAYED RELEASE ORAL at 12:52

## 2022-06-24 RX ADMIN — Medication 50 MILLIGRAM(S): at 21:17

## 2022-06-24 RX ADMIN — LURASIDONE HYDROCHLORIDE 40 MILLIGRAM(S): 40 TABLET ORAL at 12:52

## 2022-06-24 RX ADMIN — Medication 750 MILLIGRAM(S): at 19:45

## 2022-06-24 RX ADMIN — OXYCODONE HYDROCHLORIDE 10 MILLIGRAM(S): 5 TABLET ORAL at 08:31

## 2022-06-24 RX ADMIN — Medication 166.67 MILLIGRAM(S): at 17:01

## 2022-06-24 RX ADMIN — Medication 3 MILLIGRAM(S): at 21:21

## 2022-06-24 RX ADMIN — Medication 300 MILLIGRAM(S): at 19:15

## 2022-06-24 RX ADMIN — PANTOPRAZOLE SODIUM 40 MILLIGRAM(S): 20 TABLET, DELAYED RELEASE ORAL at 12:52

## 2022-06-24 RX ADMIN — OXYCODONE HYDROCHLORIDE 10 MILLIGRAM(S): 5 TABLET ORAL at 13:13

## 2022-06-24 RX ADMIN — QUETIAPINE FUMARATE 50 MILLIGRAM(S): 200 TABLET, FILM COATED ORAL at 12:52

## 2022-06-24 RX ADMIN — Medication 25 MILLIGRAM(S): at 19:20

## 2022-06-24 RX ADMIN — Medication 0.5 MILLIGRAM(S): at 07:10

## 2022-06-24 RX ADMIN — OXYCODONE HYDROCHLORIDE 10 MILLIGRAM(S): 5 TABLET ORAL at 12:43

## 2022-06-24 RX ADMIN — QUETIAPINE FUMARATE 200 MILLIGRAM(S): 200 TABLET, FILM COATED ORAL at 07:01

## 2022-06-24 RX ADMIN — ENOXAPARIN SODIUM 120 MILLIGRAM(S): 100 INJECTION SUBCUTANEOUS at 17:37

## 2022-06-24 RX ADMIN — Medication 0.5 MILLIGRAM(S): at 17:01

## 2022-06-24 RX ADMIN — OXYCODONE HYDROCHLORIDE 10 MILLIGRAM(S): 5 TABLET ORAL at 17:01

## 2022-06-24 RX ADMIN — POLYETHYLENE GLYCOL 3350 17 GRAM(S): 17 POWDER, FOR SOLUTION ORAL at 12:53

## 2022-06-24 RX ADMIN — ARIPIPRAZOLE 30 MILLIGRAM(S): 15 TABLET ORAL at 12:52

## 2022-06-24 RX ADMIN — Medication 50 MILLIGRAM(S): at 23:11

## 2022-06-24 RX ADMIN — DIVALPROEX SODIUM 1000 MILLIGRAM(S): 500 TABLET, DELAYED RELEASE ORAL at 21:19

## 2022-06-24 RX ADMIN — Medication 100 MILLIGRAM(S): at 07:00

## 2022-06-24 RX ADMIN — Medication 15 MILLILITER(S): at 12:53

## 2022-06-24 RX ADMIN — QUETIAPINE FUMARATE 200 MILLIGRAM(S): 200 TABLET, FILM COATED ORAL at 17:02

## 2022-06-24 RX ADMIN — Medication 75 MICROGRAM(S): at 07:00

## 2022-06-24 RX ADMIN — Medication 50 MILLIGRAM(S): at 12:01

## 2022-06-24 NOTE — PROGRESS NOTE ADULT - SUBJECTIVE AND OBJECTIVE BOX
Authored By:  Rony Walton MD  PGY-1, Internal Medicine    INTERVAL HPI/OVERNIGHT EVENTS:     SUBJECTIVE: Patient seen and examined at bedside this morning.     OBJECTIVE:      CAPILLARY BLOOD GLUCOSE          ICU Vital Signs Last 24 Hrs  T(C): 36.4 (24 Jun 2022 06:03), Max: 36.4 (24 Jun 2022 06:03)  T(F): 97.6 (24 Jun 2022 06:03), Max: 97.6 (24 Jun 2022 06:03)  HR: 93 (24 Jun 2022 06:03) (93 - 123)  BP: 107/63 (24 Jun 2022 06:03) (99/59 - 107/63)  BP(mean): --  ABP: --  ABP(mean): --  RR: 17 (24 Jun 2022 06:03) (17 - 17)  SpO2: 96% (24 Jun 2022 06:03) (96% - 98%)    PHYSICAL EXAM:      MEDICATIONS:  MEDICATIONS  (STANDING):  ARIPiprazole 30 milliGRAM(s) Oral daily  chlorhexidine 2% Cloths 1 Application(s) Topical daily  chlorproMAZINE    Tablet 50 milliGRAM(s) Oral <User Schedule>  clindamycin IVPB 600 milliGRAM(s) IV Intermittent every 8 hours  diVALproex  milliGRAM(s) Oral daily  diVALproex ER 1000 milliGRAM(s) Oral at bedtime  levothyroxine 75 MICROGram(s) Oral daily  LORazepam     Tablet 0.5 milliGRAM(s) Oral <User Schedule>  lurasidone 40 milliGRAM(s) Oral daily  melatonin 3 milliGRAM(s) Oral at bedtime  multivitamin/minerals/iron Oral Solution (CENTRUM) 15 milliLiter(s) Oral daily  pantoprazole   Suspension 40 milliGRAM(s) Oral daily  polyethylene glycol 3350 Oral Powder - Peds 17 Gram(s) Oral daily  QUEtiapine 200 milliGRAM(s) Oral two times a day  QUEtiapine 50 milliGRAM(s) Oral <User Schedule>    MEDICATIONS  (PRN):  acetaminophen     Tablet .. 650 milliGRAM(s) Oral every 6 hours PRN Temp greater or equal to 38C (100.4F), Mild Pain (1 - 3)  ALBUTerol    90 MICROgram(s) HFA Inhaler 2 Puff(s) Inhalation every 6 hours PRN Shortness of Breath and/or Wheezing  chlorproMAZINE    Injectable 50 milliGRAM(s) IntraMuscular every 4 hours PRN Agitation  diphenhydrAMINE Elixir 25 milliGRAM(s) Oral every 8 hours PRN Itching  LORazepam   Injectable 2 milliGRAM(s) IntraMuscular every 4 hours PRN Severe Agitation  oxyCODONE    IR 10 milliGRAM(s) Oral every 4 hours PRN Severe Pain (7 - 10)  oxyCODONE    IR 5 milliGRAM(s) Oral every 4 hours PRN Moderate Pain (4 - 6)      ALLERGIES:  Allergies    latex (Blisters)  penicillin (Hives)  penicillin (Other)  pineapple (Unknown)  Toradol (Rash)  Toradol (Unknown)  Zofran (Hives)  Zofran (Unknown)    Intolerances        LABS:    Hemoglobin: 10.5 g/dL (06-22 @ 02:00)  Hemoglobin: 10.9 g/dL (06-20 @ 23:42)  Hemoglobin: 10.2 g/dL (06-20 @ 06:57)  Hemoglobin: 10.8 g/dL (06-19 @ 12:20)            Creatinine Trend: 0.80<--, 0.79<--, 0.71<--, 0.76<--, 0.89<--, 0.70<--        hs Troponin:              CSF:                      EKG:   MICROBIOLOGY:    IMAGING:      Labs, imaging, EKG personally reviewed    RADIOLOGY & ADDITIONAL TESTS: Reviewed. Authored By:  Rony Walton MD  PGY-1, Internal Medicine    INTERVAL HPI/OVERNIGHT EVENTS:     SUBJECTIVE: Patient seen and examined at bedside this morning. No acute events overnight. Still endorsing L shoulder pain. ROS negative otherwise.     OBJECTIVE:    Vital Signs Last 24 Hrs  T(C): 36.4 (24 Jun 2022 06:03), Max: 36.4 (24 Jun 2022 06:03)  T(F): 97.6 (24 Jun 2022 06:03), Max: 97.6 (24 Jun 2022 06:03)  HR: 93 (24 Jun 2022 06:03) (93 - 123)  BP: 107/63 (24 Jun 2022 06:03) (99/59 - 107/63)  BP(mean): --  ABP: --  ABP(mean): --  RR: 17 (24 Jun 2022 06:03) (17 - 17)  SpO2: 96% (24 Jun 2022 06:03) (96% - 98%)    PHYSICAL EXAM:  Gen: AAOx3, non-toxic  Head: NCAT  HEENT: EOMI, oral mucosa moist, normal conjunctiva  Lung: CTAB, no respiratory distress, no wheezes/rhonchi/rales B/L, speaking in full sentences  CV: RRR, no murmurs, rubs or gallops  Abd: soft, NTND, no guarding, no CVA tenderness  MSK: L shoulder fluctuance      MEDICATIONS:  MEDICATIONS  (STANDING):  ARIPiprazole 30 milliGRAM(s) Oral daily  chlorhexidine 2% Cloths 1 Application(s) Topical daily  chlorproMAZINE    Tablet 50 milliGRAM(s) Oral <User Schedule>  clindamycin IVPB 600 milliGRAM(s) IV Intermittent every 8 hours  diVALproex  milliGRAM(s) Oral daily  diVALproex ER 1000 milliGRAM(s) Oral at bedtime  levothyroxine 75 MICROGram(s) Oral daily  LORazepam     Tablet 0.5 milliGRAM(s) Oral <User Schedule>  lurasidone 40 milliGRAM(s) Oral daily  melatonin 3 milliGRAM(s) Oral at bedtime  multivitamin/minerals/iron Oral Solution (CENTRUM) 15 milliLiter(s) Oral daily  pantoprazole   Suspension 40 milliGRAM(s) Oral daily  polyethylene glycol 3350 Oral Powder - Peds 17 Gram(s) Oral daily  QUEtiapine 200 milliGRAM(s) Oral two times a day  QUEtiapine 50 milliGRAM(s) Oral <User Schedule>    MEDICATIONS  (PRN):  acetaminophen     Tablet .. 650 milliGRAM(s) Oral every 6 hours PRN Temp greater or equal to 38C (100.4F), Mild Pain (1 - 3)  ALBUTerol    90 MICROgram(s) HFA Inhaler 2 Puff(s) Inhalation every 6 hours PRN Shortness of Breath and/or Wheezing  chlorproMAZINE    Injectable 50 milliGRAM(s) IntraMuscular every 4 hours PRN Agitation  diphenhydrAMINE Elixir 25 milliGRAM(s) Oral every 8 hours PRN Itching  LORazepam   Injectable 2 milliGRAM(s) IntraMuscular every 4 hours PRN Severe Agitation  oxyCODONE    IR 10 milliGRAM(s) Oral every 4 hours PRN Severe Pain (7 - 10)  oxyCODONE    IR 5 milliGRAM(s) Oral every 4 hours PRN Moderate Pain (4 - 6)      ALLERGIES:  Allergies    latex (Blisters)  penicillin (Hives)  penicillin (Other)  pineapple (Unknown)  Toradol (Rash)  Toradol (Unknown)  Zofran (Hives)  Zofran (Unknown)    Intolerances        LABS:    Hemoglobin: 10.5 g/dL (06-22 @ 02:00)  Hemoglobin: 10.9 g/dL (06-20 @ 23:42)  Hemoglobin: 10.2 g/dL (06-20 @ 06:57)  Hemoglobin: 10.8 g/dL (06-19 @ 12:20)        Creatinine Trend: 0.80<--, 0.79<--, 0.71<--, 0.76<--, 0.89<--, 0.70<--    EKG:   MICROBIOLOGY:    IMAGING:      Labs, imaging, EKG personally reviewed    RADIOLOGY & ADDITIONAL TESTS: Reviewed.

## 2022-06-24 NOTE — PROGRESS NOTE ADULT - PROBLEM SELECTOR PLAN 1
Assessment:  - patient presented with abscess of the L shoulder secondary to IM injection  - no leukocytosis however is tachycardic and mildly low BP  - previously had R shoulder abscess in the past  - CT L shoulder show Left upper extremity abscess closely associated with the deltoid musculature. A couple foci of subcutaneous gas along the inferior margin of the collection are most likely related to recent upper extremity injection.   - surgery consulted, no indication for intervention, negative for nec fasc   - D/c vancomycin, continue on clindamycin 600 TID IV  - Ultrasound demonstrating R arm abscess   - Legal consent to be obtained from brother (Bill); number in provider Handoff  - To have I/D under sedation in the AM per IR, NPO at midnight Assessment:  - patient presented with abscess of the L shoulder secondary to IM injection  - no leukocytosis however is tachycardic and mildly low BP  - previously had R shoulder abscess in the past  - CT L shoulder show Left upper extremity abscess closely associated with the deltoid musculature. A couple foci of subcutaneous gas along the inferior margin of the collection are most likely related to recent upper extremity injection.   - surgery consulted, no indication for intervention, negative for nec fasc   - D/c vancomycin, s/p clindamycin 600 TID IV  - Ultrasound demonstrating R arm abscess   - (6/24) - Decision was made today by IR team to hold off on I/D procedure; Ethics consulted to help assist with procedural clearance; will need to have both legal guardian and state board clearance per ethics curbside consultation. Pending official consult  - ID consulted, rec switching clindamycin to vanc

## 2022-06-24 NOTE — PROGRESS NOTE ADULT - PROBLEM SELECTOR PLAN 3
Use drops as directed. Assessment:  - patient unknown timeframe of LE swelling b/l; believes that it started in March     Plan:  - will get dopplers of LE to evaluate for DVT in the setting of factor V leiden  - Pro BNP w/i nl

## 2022-06-24 NOTE — CONSULT NOTE ADULT - PROBLEM SELECTOR RECOMMENDATION 9
Pt p/w left upper shoulder pain, induration with recent IM injection. Recently admitted for RUE abscess s/p IM injection was was drained in ED, aspirated by IR, negative cultures, AFB culture being performed, d/tone on PO doxy  -CT left extremity showing abscess secondary to IM injection, patient remains afebrile, no white count. Blood cs negative. USG left extremity showing complex fluid collection  -Recommend to switch from IV Clinda to IV Vancomycin. Surgery and IR following, patient planned for further intervention given significant restriction in movement.  -Await cultures s/p intervention

## 2022-06-24 NOTE — CONSULT NOTE ADULT - SUBJECTIVE AND OBJECTIVE BOX
Patient is a 37y old  Female who presents with a chief complaint of L shoulder pain (24 Jun 2022 14:10)    HPI:  This is a 35 y/o F with pmhx of bipolar disorder, hypothyroidism, factor V Leiden on eliquis, seizures, presented to the ED for new onset L shoulder pain. The patient was previously admitted at [place] for R shoulder pain after an IM injection. Work up showed a R sided abscess. She had I and D and IR drainage of the abscess collection. 2 days ago while she was still admitted, she also had received an IM injection on the L arm during that admission for anxiety. She was then discharged with doxycycline. The patient now returns with new onset L sided shoulder pain and swelling started today. The patient also appears lethargic appearing and having chills. The patient endorsed pain on the L deltoid area where she got her IM injection with mild erythema. Pain also tracks down medially down to the elbow in the medial epicondyle with mild swelling. Denies chest pain, SOB, abdominal pain, dysuria. (18 Jun 2022 02:41)    ID history: Patient is a 35 yo F, from [place], hx of Factor V Leiden on Eliquis, Bipolar disease, recent Right shoulder abscess after an IM injection s/p I and D and aspiration procedure at [facility] with negative cultures from 6/10 to 6/16, discharged with PO doxy, returning back for left shoulder pain associated with chills s/p IM injection. Pt was on IV Vancomycin during last hospitalization, d/c'ed on PO Doxy. Ultrasound left extremity this admission showed complex fluid collection measuring 3.1 x 1.6 x 4.0 cm associated with significant decreased ROM on left shoulder abduction. Pt is Penicillin allergy> says she's turned red prior whenever she received it as child. Surgery and IR following the patient, surgery recommended IR drainage. Blood cultures no growth this admission. Admits to chills prior to admission, no fevers. Normal WBC and remains afebrile during present admission. Never had an event like this prior throughout her life.      REVIEW OF SYSTEMS  [  ] ROS unobtainable because:    [ x ] All other systems negative except as noted below    Constitutional:  [ ] fever [ ] chills  [ ] weight loss  [ ]night sweat  [ ]poor appetite/PO intake [ ]fatigue   Skin:  [ ] rash [ ] phlebitis	  Eyes: [ ] icterus [ ] pain  [ ] discharge	  ENMT: [ ] sore throat  [ ] thrush [ ] ulcers [ ] exudates [ ]anosmia  Respiratory: [ ] dyspnea [ ] hemoptysis [ ] cough [ ] sputum	  Cardiovascular:  [ ] chest pain [ ] palpitations [ ] edema	  Gastrointestinal:  [ ] nausea [ ] vomiting [ ] diarrhea [ ] constipation [ ] pain	  Genitourinary:  [ ] dysuria [ ] frequency [ ] hematuria [ ] discharge [ ] flank pain  [ ] incontinence  Musculoskeletal:  [ ] myalgias [ ] arthralgias [ ] arthritis  [ ] back pain  Neurological:  [ ] headache [ ] weakness [ ] seizures  [ ] confusion/altered mental status    prior hospital charts reviewed [V]  primary team notes reviewed [V]  other consultant notes reviewed [V]    PAST MEDICAL & SURGICAL HISTORY:  Asthma      Seizure      Factor V Leiden      Bipolar disorder      Endometriosis      History of DVT in adulthood  RLE on eliquis      Hypothyroid      Seasonal allergies      Insomnia      GERD (gastroesophageal reflux disease)      No significant past surgical history          SOCIAL HISTORY:  - Denied smoking/vaping/alcohol/recreational drug use    FAMILY HISTORY:  Family history of DVT (Mother)        Allergies  latex (Blisters)  penicillin (Hives)  penicillin (Other)  pineapple (Unknown)  Toradol (Rash)  Toradol (Unknown)  Zofran (Hives)  Zofran (Unknown)        ANTIMICROBIALS:  clindamycin IVPB 600 every 8 hours      ANTIMICROBIALS (past 90 days):  MEDICATIONS  (STANDING):  cefepime   IVPB   100 mL/Hr IV Intermittent (06-17-22 @ 23:39)    clindamycin IVPB   100 mL/Hr IV Intermittent (06-24-22 @ 07:00)   100 mL/Hr IV Intermittent (06-23-22 @ 21:54)   100 mL/Hr IV Intermittent (06-23-22 @ 13:19)   100 mL/Hr IV Intermittent (06-23-22 @ 08:54)   100 mL/Hr IV Intermittent (06-23-22 @ 01:56)   100 mL/Hr IV Intermittent (06-22-22 @ 06:03)   100 mL/Hr IV Intermittent (06-21-22 @ 13:58)   100 mL/Hr IV Intermittent (06-21-22 @ 06:36)   100 mL/Hr IV Intermittent (06-20-22 @ 21:12)   100 mL/Hr IV Intermittent (06-20-22 @ 13:55)   100 mL/Hr IV Intermittent (06-20-22 @ 05:08)   100 mL/Hr IV Intermittent (06-19-22 @ 21:44)   100 mL/Hr IV Intermittent (06-19-22 @ 13:29)   100 mL/Hr IV Intermittent (06-19-22 @ 06:27)   100 mL/Hr IV Intermittent (06-18-22 @ 22:29)   100 mL/Hr IV Intermittent (06-18-22 @ 14:25)   100 mL/Hr IV Intermittent (06-18-22 @ 09:37)    clindamycin IVPB   100 mL/Hr IV Intermittent (06-18-22 @ 02:15)    vancomycin  IVPB.   250 mL/Hr IV Intermittent (06-18-22 @ 00:30)        OTHER MEDS:   MEDICATIONS  (STANDING):  acetaminophen     Tablet .. 650 every 6 hours PRN  ALBUTerol    90 MICROgram(s) HFA Inhaler 2 every 6 hours PRN  ARIPiprazole 30 daily  chlorproMAZINE    Injectable 50 every 4 hours PRN  chlorproMAZINE    Tablet 50 <User Schedule>  diphenhydrAMINE Elixir 25 every 8 hours PRN  diVALproex  daily  diVALproex ER 1000 at bedtime  enoxaparin Injectable 120 every 12 hours  levothyroxine 75 daily  LORazepam     Tablet 0.5 <User Schedule>  LORazepam   Injectable 2 every 4 hours PRN  lurasidone 40 daily  melatonin 3 at bedtime  oxyCODONE    IR 10 every 4 hours PRN  oxyCODONE    IR 5 every 4 hours PRN  pantoprazole   Suspension 40 daily  polyethylene glycol 3350 Oral Powder - Peds 17 daily  QUEtiapine 200 two times a day  QUEtiapine 50 <User Schedule>      VITALS:  Vital Signs Last 24 Hrs  T(F): 97.7 (06-24-22 @ 11:29), Max: 98.9 (06-19-22 @ 05:42)    Vital Signs Last 24 Hrs  HR: 99 (06-24-22 @ 11:29) (93 - 123)  BP: 106/64 (06-24-22 @ 11:29) (99/59 - 107/63)  RR: 17 (06-24-22 @ 11:29)  SpO2: 99% (06-24-22 @ 11:29) (96% - 99%)  Wt(kg): --    EXAM:    GA: NAD, AOx3  HEENT: oral cavity no lesion  CV: nl S1/S2, no RMG  Lungs: CTAB, No distress  Abd: BS+, soft, nontender, no rebounding pain  Ext: no edema  Neuro: No focal deficits  Skin: 10 x 4 cm left shoulder induration, associated   IV: no phlebitis    Labs:            WBC Trend:  WBC Count: 4.77 (06-22-22 @ 02:00)  WBC Count: 4.38 (06-20-22 @ 23:42)  WBC Count: 4.32 (06-20-22 @ 06:57)      Auto Neutrophil #: 2.36 K/uL (06-22-22 @ 02:00)  Auto Neutrophil #: 1.70 K/uL (06-20-22 @ 06:57)  Auto Neutrophil #: 1.89 K/uL (06-19-22 @ 06:42)  Auto Neutrophil #: 4.60 K/uL (06-17-22 @ 21:52)  Auto Neutrophil #: 6.07 K/uL (06-03-22 @ 14:31)      Creatine Trend:  Creatinine, Serum: 0.80 (06-22)  Creatinine, Serum: 0.79 (06-20)  Creatinine, Serum: 0.71 (06-20)  Creatinine, Serum: 0.76 (06-19)      Liver Biochemical Testing Trend:  Alanine Aminotransferase (ALT/SGPT): 24 (06-22)  Alanine Aminotransferase (ALT/SGPT): 24 (06-20)  Alanine Aminotransferase (ALT/SGPT): 19 (06-17)  Alanine Aminotransferase (ALT/SGPT): 45 (06-11)  Alanine Aminotransferase (ALT/SGPT): 42 (06-10)  Aspartate Aminotransferase (AST/SGOT): 18 (06-22-22 @ 02:00)  Aspartate Aminotransferase (AST/SGOT): 22 (06-20-22 @ 10:15)  Aspartate Aminotransferase (AST/SGOT): 15 (06-17-22 @ 21:52)  Aspartate Aminotransferase (AST/SGOT): 35 (06-11-22 @ 10:32)  Aspartate Aminotransferase (AST/SGOT): 17 (06-10-22 @ 00:52)  Bilirubin Total, Serum: <0.2 (06-22)  Bilirubin Total, Serum: 0.2 (06-20)  Bilirubin Total, Serum: 0.2 (06-17)  Bilirubin Total, Serum: 0.3 (06-11)  Bilirubin Total, Serum: 0.2 (06-10)      Trend LDH      Auto Eosinophil %: 1.5 % (06-22-22 @ 02:00)          MICROBIOLOGY:        Culture - Blood (collected 17 Jun 2022 23:40)  Source: .Blood Blood-Venous  Final Report:    No Growth Final    Culture - Blood (collected 17 Jun 2022 23:30)  Source: .Blood Blood-Peripheral  Final Report:    No Growth Final    Culture - Acid Fast - Body Fluid w/Smear (collected 16 Jun 2022 12:53)  Source: .Body Fluid Interstitial Fluid  Preliminary Report:    Culture is being performed.    Culture - Fungal, Body Fluid (collected 13 Jun 2022 19:00)  Source: .Body Fluid righ arm fluid collection  Preliminary Report:    No growth    Culture - Body Fluid with Gram Stain (collected 13 Jun 2022 19:00)  Source: .Body Fluid righ arm fluid collection  Final Report:    No growth    Culture - Abscess with Gram Stain (collected 10 Júnior 2022 10:39)  Source: .Abscess Arm - Right  Final Report:    No growth at 5 days    Culture - Blood (collected 10 Júnior 2022 10:35)  Source: .Blood Blood-Peripheral  Final Report:    No Growth Final    Culture - Blood (collected 10 Júnior 2022 10:35)  Source: .Blood Blood-Peripheral  Final Report:    No Growth Final    Culture - Urine (collected 04 Jun 2022 01:05)  Source: Clean Catch Clean Catch (Midstream)  Final Report:    >=3 organisms. Probable collection contamination.    Culture - Blood (collected 24 May 2022 01:10)  Source: .Blood Blood  Final Report:    No Growth Final                          COVID-19 PCR: NotDetec (06-21-22 @ 18:55)    COVID-19 Peter Domain AB Interp: Positive (03-10-22 @ 09:09)      Procalcitonin, Serum: 0.04 (06-17)    C-Reactive Protein, Serum: 13.4 (06-17)          Serum Pro-Brain Natriuretic Peptide: 41 (06-19)        A1C with Estimated Average Glucose Result: 4.9 % (06-17-22 @ 21:52)    Sedimentation Rate, Erythrocyte: 23 mm/hr (06-17-22 @ 21:52)    CSF:                  RADIOLOGY:  imaging below personally reviewed     Patient is a 37y old  Female who presents with a chief complaint of L shoulder pain (24 Jun 2022 14:10)    HPI:  This is a 37 y/o F with pmhx of bipolar disorder, hypothyroidism, factor V Leiden on eliquis, seizures, presented to the ED for new onset L shoulder pain. The patient was previously admitted at [place] for R shoulder pain after an IM injection. Work up showed a R sided abscess. She had I and D and IR drainage of the abscess collection. 2 days ago while she was still admitted, she also had received an IM injection on the L arm during that admission for anxiety. She was then discharged with doxycycline. The patient now returns with new onset L sided shoulder pain and swelling started today. The patient also appears lethargic appearing and having chills. The patient endorsed pain on the L deltoid area where she got her IM injection with mild erythema. Pain also tracks down medially down to the elbow in the medial epicondyle with mild swelling. Denies chest pain, SOB, abdominal pain, dysuria. (18 Jun 2022 02:41)    ID history: Patient is a 37 yo F, from [place], hx of Factor V Leiden on Eliquis, Bipolar disease, recent Right shoulder abscess after an IM injection s/p I and D and aspiration procedure at [facility] with negative cultures from 6/10 to 6/16, discharged with PO doxy, returning back for left shoulder pain associated with chills s/p IM injection. Pt was on IV Vancomycin during last hospitalization, d/c'ed on PO Doxy. CT left upper extremity showed left upper extremity abscess in the deltoid musculature, related to the recent injection. Ultrasound left extremity this admission showed complex fluid collection measuring 3.1 x 1.6 x 4.0 cm associated with significant decreased ROM on left shoulder abduction. Pt is Penicillin allergic> says she's turned red prior whenever she received it as child. Surgery and IR following the patient, surgery recommended IR drainage. Blood cultures no growth this admission. Admits to chills prior to admission, no fevers. Normal WBC and remains afebrile during present admission. Never had an event like this prior throughout her life.      REVIEW OF SYSTEMS  [  ] ROS unobtainable because:    [ x ] All other systems negative except as noted below    Constitutional:  [ ] fever [ ] chills  [ ] weight loss  [ ]night sweat  [ ]poor appetite/PO intake [ ]fatigue   Skin:  [ ] rash [ ] phlebitis	  Eyes: [ ] icterus [ ] pain  [ ] discharge	  ENMT: [ ] sore throat  [ ] thrush [ ] ulcers [ ] exudates [ ]anosmia  Respiratory: [ ] dyspnea [ ] hemoptysis [ ] cough [ ] sputum	  Cardiovascular:  [ ] chest pain [ ] palpitations [ ] edema	  Gastrointestinal:  [ ] nausea [ ] vomiting [ ] diarrhea [ ] constipation [ ] pain	  Genitourinary:  [ ] dysuria [ ] frequency [ ] hematuria [ ] discharge [ ] flank pain  [ ] incontinence  Musculoskeletal:  [ ] myalgias [ ] arthralgias [ ] arthritis  [ ] back pain  Neurological:  [ ] headache [ ] weakness [ ] seizures  [ ] confusion/altered mental status    prior hospital charts reviewed [V]  primary team notes reviewed [V]  other consultant notes reviewed [V]    PAST MEDICAL & SURGICAL HISTORY:  Asthma      Seizure      Factor V Leiden      Bipolar disorder      Endometriosis      History of DVT in adulthood  RLE on eliquis      Hypothyroid      Seasonal allergies      Insomnia      GERD (gastroesophageal reflux disease)      No significant past surgical history          SOCIAL HISTORY:  - Denied smoking/vaping/alcohol/recreational drug use    FAMILY HISTORY:  Family history of DVT (Mother)        Allergies  latex (Blisters)  penicillin (Hives)  penicillin (Other)  pineapple (Unknown)  Toradol (Rash)  Toradol (Unknown)  Zofran (Hives)  Zofran (Unknown)        ANTIMICROBIALS:  clindamycin IVPB 600 every 8 hours      ANTIMICROBIALS (past 90 days):  MEDICATIONS  (STANDING):  cefepime   IVPB   100 mL/Hr IV Intermittent (06-17-22 @ 23:39)    clindamycin IVPB   100 mL/Hr IV Intermittent (06-24-22 @ 07:00)   100 mL/Hr IV Intermittent (06-23-22 @ 21:54)   100 mL/Hr IV Intermittent (06-23-22 @ 13:19)   100 mL/Hr IV Intermittent (06-23-22 @ 08:54)   100 mL/Hr IV Intermittent (06-23-22 @ 01:56)   100 mL/Hr IV Intermittent (06-22-22 @ 06:03)   100 mL/Hr IV Intermittent (06-21-22 @ 13:58)   100 mL/Hr IV Intermittent (06-21-22 @ 06:36)   100 mL/Hr IV Intermittent (06-20-22 @ 21:12)   100 mL/Hr IV Intermittent (06-20-22 @ 13:55)   100 mL/Hr IV Intermittent (06-20-22 @ 05:08)   100 mL/Hr IV Intermittent (06-19-22 @ 21:44)   100 mL/Hr IV Intermittent (06-19-22 @ 13:29)   100 mL/Hr IV Intermittent (06-19-22 @ 06:27)   100 mL/Hr IV Intermittent (06-18-22 @ 22:29)   100 mL/Hr IV Intermittent (06-18-22 @ 14:25)   100 mL/Hr IV Intermittent (06-18-22 @ 09:37)    clindamycin IVPB   100 mL/Hr IV Intermittent (06-18-22 @ 02:15)    vancomycin  IVPB.   250 mL/Hr IV Intermittent (06-18-22 @ 00:30)        OTHER MEDS:   MEDICATIONS  (STANDING):  acetaminophen     Tablet .. 650 every 6 hours PRN  ALBUTerol    90 MICROgram(s) HFA Inhaler 2 every 6 hours PRN  ARIPiprazole 30 daily  chlorproMAZINE    Injectable 50 every 4 hours PRN  chlorproMAZINE    Tablet 50 <User Schedule>  diphenhydrAMINE Elixir 25 every 8 hours PRN  diVALproex  daily  diVALproex ER 1000 at bedtime  enoxaparin Injectable 120 every 12 hours  levothyroxine 75 daily  LORazepam     Tablet 0.5 <User Schedule>  LORazepam   Injectable 2 every 4 hours PRN  lurasidone 40 daily  melatonin 3 at bedtime  oxyCODONE    IR 10 every 4 hours PRN  oxyCODONE    IR 5 every 4 hours PRN  pantoprazole   Suspension 40 daily  polyethylene glycol 3350 Oral Powder - Peds 17 daily  QUEtiapine 200 two times a day  QUEtiapine 50 <User Schedule>      VITALS:  Vital Signs Last 24 Hrs  T(F): 97.7 (06-24-22 @ 11:29), Max: 98.9 (06-19-22 @ 05:42)    Vital Signs Last 24 Hrs  HR: 99 (06-24-22 @ 11:29) (93 - 123)  BP: 106/64 (06-24-22 @ 11:29) (99/59 - 107/63)  RR: 17 (06-24-22 @ 11:29)  SpO2: 99% (06-24-22 @ 11:29) (96% - 99%)  Wt(kg): --    EXAM:    GA: NAD, AOx3  HEENT: oral cavity no lesion  CV: nl S1/S2, no RMG  Lungs: CTAB, No distress  Abd: BS+, soft, nontender, no rebounding pain  Ext: no edema  Musculoskeletal: Decreased ROM left arm  Neuro: No focal deficits  Skin: 10 x 4 cm left shoulder induration, associated tenderness. Right upper arm induration resolving  IV: no phlebitis    Labs:            WBC Trend:  WBC Count: 4.77 (06-22-22 @ 02:00)  WBC Count: 4.38 (06-20-22 @ 23:42)  WBC Count: 4.32 (06-20-22 @ 06:57)      Auto Neutrophil #: 2.36 K/uL (06-22-22 @ 02:00)  Auto Neutrophil #: 1.70 K/uL (06-20-22 @ 06:57)  Auto Neutrophil #: 1.89 K/uL (06-19-22 @ 06:42)  Auto Neutrophil #: 4.60 K/uL (06-17-22 @ 21:52)  Auto Neutrophil #: 6.07 K/uL (06-03-22 @ 14:31)      Creatine Trend:  Creatinine, Serum: 0.80 (06-22)  Creatinine, Serum: 0.79 (06-20)  Creatinine, Serum: 0.71 (06-20)  Creatinine, Serum: 0.76 (06-19)      Liver Biochemical Testing Trend:  Alanine Aminotransferase (ALT/SGPT): 24 (06-22)  Alanine Aminotransferase (ALT/SGPT): 24 (06-20)  Alanine Aminotransferase (ALT/SGPT): 19 (06-17)  Alanine Aminotransferase (ALT/SGPT): 45 (06-11)  Alanine Aminotransferase (ALT/SGPT): 42 (06-10)  Aspartate Aminotransferase (AST/SGOT): 18 (06-22-22 @ 02:00)  Aspartate Aminotransferase (AST/SGOT): 22 (06-20-22 @ 10:15)  Aspartate Aminotransferase (AST/SGOT): 15 (06-17-22 @ 21:52)  Aspartate Aminotransferase (AST/SGOT): 35 (06-11-22 @ 10:32)  Aspartate Aminotransferase (AST/SGOT): 17 (06-10-22 @ 00:52)  Bilirubin Total, Serum: <0.2 (06-22)  Bilirubin Total, Serum: 0.2 (06-20)  Bilirubin Total, Serum: 0.2 (06-17)  Bilirubin Total, Serum: 0.3 (06-11)  Bilirubin Total, Serum: 0.2 (06-10)      Trend LDH      Auto Eosinophil %: 1.5 % (06-22-22 @ 02:00)          MICROBIOLOGY:        Culture - Blood (collected 17 Jun 2022 23:40)  Source: .Blood Blood-Venous  Final Report:    No Growth Final    Culture - Blood (collected 17 Jun 2022 23:30)  Source: .Blood Blood-Peripheral  Final Report:    No Growth Final    Culture - Acid Fast - Body Fluid w/Smear (collected 16 Jun 2022 12:53)  Source: .Body Fluid Interstitial Fluid  Preliminary Report:    Culture is being performed.    Culture - Fungal, Body Fluid (collected 13 Jun 2022 19:00)  Source: .Body Fluid righ arm fluid collection  Preliminary Report:    No growth    Culture - Body Fluid with Gram Stain (collected 13 Jun 2022 19:00)  Source: .Body Fluid righ arm fluid collection  Final Report:    No growth    Culture - Abscess with Gram Stain (collected 10 Júnior 2022 10:39)  Source: .Abscess Arm - Right  Final Report:    No growth at 5 days    Culture - Blood (collected 10 Júnior 2022 10:35)  Source: .Blood Blood-Peripheral  Final Report:    No Growth Final    Culture - Blood (collected 10 Júnior 2022 10:35)  Source: .Blood Blood-Peripheral  Final Report:    No Growth Final    Culture - Urine (collected 04 Jun 2022 01:05)  Source: Clean Catch Clean Catch (Midstream)  Final Report:    >=3 organisms. Probable collection contamination.    Culture - Blood (collected 24 May 2022 01:10)  Source: .Blood Blood  Final Report:    No Growth Final                          COVID-19 PCR: NotDetec (06-21-22 @ 18:55)    COVID-19 Peter Domain AB Interp: Positive (03-10-22 @ 09:09)      Procalcitonin, Serum: 0.04 (06-17)    C-Reactive Protein, Serum: 13.4 (06-17)          Serum Pro-Brain Natriuretic Peptide: 41 (06-19)        A1C with Estimated Average Glucose Result: 4.9 % (06-17-22 @ 21:52)    Sedimentation Rate, Erythrocyte: 23 mm/hr (06-17-22 @ 21:52)    CSF:                  RADIOLOGY:  imaging below personally reviewed     Patient is a 37y old  Female who presents with a chief complaint of L shoulder pain (24 Jun 2022 14:10)    HPI:  This is a 35 y/o F with pmhx of bipolar disorder, hypothyroidism, factor V Leiden on eliquis, seizures, presented to the ED for new onset L shoulder pain. The patient was previously admitted at [place] for R shoulder pain after an IM injection. Work up showed a R sided abscess. She had I and D and IR drainage of the abscess collection. 2 days ago while she was still admitted, she also had received an IM injection on the L arm during that admission for anxiety. She was then discharged with doxycycline. The patient now returns with new onset L sided shoulder pain and swelling started today. The patient also appears lethargic appearing and having chills. The patient endorsed pain on the L deltoid area where she got her IM injection with mild erythema. Pain also tracks down medially down to the elbow in the medial epicondyle with mild swelling. Denies chest pain, SOB, abdominal pain, dysuria. (18 Jun 2022 02:41)    ID history: Patient is a 35 yo F, from shelter, hx of Factor V Leiden on Eliquis, Bipolar disease, recent Right shoulder abscess after an IM injection s/p I and D and aspiration procedure at [facility] with negative cultures from 6/10 to 6/16, discharged with PO doxy, returning back for left shoulder pain associated with chills s/p IM injection. Pt was on IV Vancomycin during last hospitalization, d/c'ed on PO Doxy. CT left upper extremity showed left upper extremity abscess in the deltoid musculature, related to the recent injection. Ultrasound left extremity this admission showed complex fluid collection measuring 3.1 x 1.6 x 4.0 cm associated with significant decreased ROM on left shoulder abduction. Pt is Penicillin allergic> says she's turned red prior whenever she received it as child. Surgery and IR following the patient, surgery recommended IR drainage. Blood cultures no growth this admission. Admits to chills prior to admission, no fevers. Normal WBC and remains afebrile during present admission. Never had an event like this prior throughout her life.      REVIEW OF SYSTEMS  [  ] ROS unobtainable because:    [ x ] All other systems negative except as noted below    Constitutional:  [ ] fever [ ] chills  [ ] weight loss  [ ]night sweat  [ ]poor appetite/PO intake [ ]fatigue   Skin:  [ ] rash [ ] phlebitis	  Eyes: [ ] icterus [ ] pain  [ ] discharge	  ENMT: [ ] sore throat  [ ] thrush [ ] ulcers [ ] exudates [ ]anosmia  Respiratory: [ ] dyspnea [ ] hemoptysis [ ] cough [ ] sputum	  Cardiovascular:  [ ] chest pain [ ] palpitations [ ] edema	  Gastrointestinal:  [ ] nausea [ ] vomiting [ ] diarrhea [ ] constipation [ ] pain	  Genitourinary:  [ ] dysuria [ ] frequency [ ] hematuria [ ] discharge [ ] flank pain  [ ] incontinence  Musculoskeletal:  [ ] myalgias [ ] arthralgias [ ] arthritis  [ ] back pain  Neurological:  [ ] headache [ ] weakness [ ] seizures  [ ] confusion/altered mental status    prior hospital charts reviewed [V]  primary team notes reviewed [V]  other consultant notes reviewed [V]    PAST MEDICAL & SURGICAL HISTORY:  Asthma      Seizure      Factor V Leiden      Bipolar disorder      Endometriosis      History of DVT in adulthood  RLE on eliquis      Hypothyroid      Seasonal allergies      Insomnia      GERD (gastroesophageal reflux disease)      No significant past surgical history          SOCIAL HISTORY:  - Denied smoking/vaping/alcohol/recreational drug use    FAMILY HISTORY:  Family history of DVT (Mother)        Allergies  latex (Blisters)  penicillin (Hives)  penicillin (Other)  pineapple (Unknown)  Toradol (Rash)  Toradol (Unknown)  Zofran (Hives)  Zofran (Unknown)        ANTIMICROBIALS:  clindamycin IVPB 600 every 8 hours      ANTIMICROBIALS (past 90 days):  MEDICATIONS  (STANDING):  cefepime   IVPB   100 mL/Hr IV Intermittent (06-17-22 @ 23:39)    clindamycin IVPB   100 mL/Hr IV Intermittent (06-24-22 @ 07:00)   100 mL/Hr IV Intermittent (06-23-22 @ 21:54)   100 mL/Hr IV Intermittent (06-23-22 @ 13:19)   100 mL/Hr IV Intermittent (06-23-22 @ 08:54)   100 mL/Hr IV Intermittent (06-23-22 @ 01:56)   100 mL/Hr IV Intermittent (06-22-22 @ 06:03)   100 mL/Hr IV Intermittent (06-21-22 @ 13:58)   100 mL/Hr IV Intermittent (06-21-22 @ 06:36)   100 mL/Hr IV Intermittent (06-20-22 @ 21:12)   100 mL/Hr IV Intermittent (06-20-22 @ 13:55)   100 mL/Hr IV Intermittent (06-20-22 @ 05:08)   100 mL/Hr IV Intermittent (06-19-22 @ 21:44)   100 mL/Hr IV Intermittent (06-19-22 @ 13:29)   100 mL/Hr IV Intermittent (06-19-22 @ 06:27)   100 mL/Hr IV Intermittent (06-18-22 @ 22:29)   100 mL/Hr IV Intermittent (06-18-22 @ 14:25)   100 mL/Hr IV Intermittent (06-18-22 @ 09:37)    clindamycin IVPB   100 mL/Hr IV Intermittent (06-18-22 @ 02:15)    vancomycin  IVPB.   250 mL/Hr IV Intermittent (06-18-22 @ 00:30)        OTHER MEDS:   MEDICATIONS  (STANDING):  acetaminophen     Tablet .. 650 every 6 hours PRN  ALBUTerol    90 MICROgram(s) HFA Inhaler 2 every 6 hours PRN  ARIPiprazole 30 daily  chlorproMAZINE    Injectable 50 every 4 hours PRN  chlorproMAZINE    Tablet 50 <User Schedule>  diphenhydrAMINE Elixir 25 every 8 hours PRN  diVALproex  daily  diVALproex ER 1000 at bedtime  enoxaparin Injectable 120 every 12 hours  levothyroxine 75 daily  LORazepam     Tablet 0.5 <User Schedule>  LORazepam   Injectable 2 every 4 hours PRN  lurasidone 40 daily  melatonin 3 at bedtime  oxyCODONE    IR 10 every 4 hours PRN  oxyCODONE    IR 5 every 4 hours PRN  pantoprazole   Suspension 40 daily  polyethylene glycol 3350 Oral Powder - Peds 17 daily  QUEtiapine 200 two times a day  QUEtiapine 50 <User Schedule>      VITALS:  Vital Signs Last 24 Hrs  T(F): 97.7 (06-24-22 @ 11:29), Max: 98.9 (06-19-22 @ 05:42)    Vital Signs Last 24 Hrs  HR: 99 (06-24-22 @ 11:29) (93 - 123)  BP: 106/64 (06-24-22 @ 11:29) (99/59 - 107/63)  RR: 17 (06-24-22 @ 11:29)  SpO2: 99% (06-24-22 @ 11:29) (96% - 99%)  Wt(kg): --    EXAM:    GA: NAD, AOx3  HEENT: oral cavity no lesion  CV: nl S1/S2, no RMG  Lungs: CTAB, No distress  Abd: BS+, soft, nontender, no rebounding pain  Ext: no edema  Musculoskeletal: Decreased ROM left arm  Neuro: No focal deficits  Skin: 10 x 4 cm left shoulder induration, associated tenderness. Right upper arm induration resolving  IV: no phlebitis    Labs:            WBC Trend:  WBC Count: 4.77 (06-22-22 @ 02:00)  WBC Count: 4.38 (06-20-22 @ 23:42)  WBC Count: 4.32 (06-20-22 @ 06:57)      Auto Neutrophil #: 2.36 K/uL (06-22-22 @ 02:00)  Auto Neutrophil #: 1.70 K/uL (06-20-22 @ 06:57)  Auto Neutrophil #: 1.89 K/uL (06-19-22 @ 06:42)  Auto Neutrophil #: 4.60 K/uL (06-17-22 @ 21:52)  Auto Neutrophil #: 6.07 K/uL (06-03-22 @ 14:31)      Creatine Trend:  Creatinine, Serum: 0.80 (06-22)  Creatinine, Serum: 0.79 (06-20)  Creatinine, Serum: 0.71 (06-20)  Creatinine, Serum: 0.76 (06-19)      Liver Biochemical Testing Trend:  Alanine Aminotransferase (ALT/SGPT): 24 (06-22)  Alanine Aminotransferase (ALT/SGPT): 24 (06-20)  Alanine Aminotransferase (ALT/SGPT): 19 (06-17)  Alanine Aminotransferase (ALT/SGPT): 45 (06-11)  Alanine Aminotransferase (ALT/SGPT): 42 (06-10)  Aspartate Aminotransferase (AST/SGOT): 18 (06-22-22 @ 02:00)  Aspartate Aminotransferase (AST/SGOT): 22 (06-20-22 @ 10:15)  Aspartate Aminotransferase (AST/SGOT): 15 (06-17-22 @ 21:52)  Aspartate Aminotransferase (AST/SGOT): 35 (06-11-22 @ 10:32)  Aspartate Aminotransferase (AST/SGOT): 17 (06-10-22 @ 00:52)  Bilirubin Total, Serum: <0.2 (06-22)  Bilirubin Total, Serum: 0.2 (06-20)  Bilirubin Total, Serum: 0.2 (06-17)  Bilirubin Total, Serum: 0.3 (06-11)  Bilirubin Total, Serum: 0.2 (06-10)      Trend LDH      Auto Eosinophil %: 1.5 % (06-22-22 @ 02:00)          MICROBIOLOGY:        Culture - Blood (collected 17 Jun 2022 23:40)  Source: .Blood Blood-Venous  Final Report:    No Growth Final    Culture - Blood (collected 17 Jun 2022 23:30)  Source: .Blood Blood-Peripheral  Final Report:    No Growth Final    Culture - Acid Fast - Body Fluid w/Smear (collected 16 Jun 2022 12:53)  Source: .Body Fluid Interstitial Fluid  Preliminary Report:    Culture is being performed.    Culture - Fungal, Body Fluid (collected 13 Jun 2022 19:00)  Source: .Body Fluid righ arm fluid collection  Preliminary Report:    No growth    Culture - Body Fluid with Gram Stain (collected 13 Jun 2022 19:00)  Source: .Body Fluid righ arm fluid collection  Final Report:    No growth    Culture - Abscess with Gram Stain (collected 10 Júnior 2022 10:39)  Source: .Abscess Arm - Right  Final Report:    No growth at 5 days    Culture - Blood (collected 10 Júnior 2022 10:35)  Source: .Blood Blood-Peripheral  Final Report:    No Growth Final    Culture - Blood (collected 10 Júnior 2022 10:35)  Source: .Blood Blood-Peripheral  Final Report:    No Growth Final    Culture - Urine (collected 04 Jun 2022 01:05)  Source: Clean Catch Clean Catch (Midstream)  Final Report:    >=3 organisms. Probable collection contamination.    Culture - Blood (collected 24 May 2022 01:10)  Source: .Blood Blood  Final Report:    No Growth Final                          COVID-19 PCR: NotDetec (06-21-22 @ 18:55)    COVID-19 Peter Domain AB Interp: Positive (03-10-22 @ 09:09)      Procalcitonin, Serum: 0.04 (06-17)    C-Reactive Protein, Serum: 13.4 (06-17)          Serum Pro-Brain Natriuretic Peptide: 41 (06-19)        A1C with Estimated Average Glucose Result: 4.9 % (06-17-22 @ 21:52)    Sedimentation Rate, Erythrocyte: 23 mm/hr (06-17-22 @ 21:52)    CSF:        RADIOLOGY:    rd< from: CT Upper Extremity w/ IV Cont, Left (06.17.22 @ 22:52) >  ACC: [id number] EXAM:  CT UPR EXT IC LT                          PROCEDURE DATE:  06/17/2022          INTERPRETATION:  INDICATION: Increasing left upper extremity pain status   post intramuscular injection, concern for abscess.    TECHNIQUE: Contrast-enhanced CT of the left shoulder and humerus was   performed. Axial images were acquired with coronal and sagittal   reformats. 100 mL of Omnipaque 350 were administered. 5 mL discarded.    COMPARISON: None.    FINDINGS:  Closely associated with the lateral aspect of the deltoid musculature,   there is a 2.3 x 1.6 x 5.1 cm peripherally enhancing, walled off   collection consistent with an abscess. Extensive phlegmon and   inflammatory changes surround the collection with overlying subcutaneous   edema and mild skin thickening.    A couple punctate foci of gas are seen in the subcutaneous tissues along   the inferior margin of the collection.    A few prominent left axillary nodes are noted, for instance a 1.5 x 1.0   cm left axillary lymph node (3:56). These are likely reactive to the   above infectious process.    There is no cortical erosion or periosteal reaction to suggest   osteomyelitis.    The humerus and other visualized unremarkable. Acromioclavicular and   glenohumeral joint spaces are maintained. Joint space of the elbow are   maintained.    IMPRESSION:  Left upper extremity abscess closely associated with the deltoid   musculature.    A couple foci of subcutaneous gas along the inferior margin of the   collection are most likely related to recent upper extremity injection.   Close clinical follow-up is recommended to exclude a necrotizing soft   tissue infection.    --- End of Report ---          BRYAN NERI MD; Resident Radiologist  This document has been electronically signed.  ISAÍAS GREGORY MD; Attending Radiologist  This document has been electronically signed. Jun 18 2022 12:01AM    < end of copied text >

## 2022-06-24 NOTE — CONSULT NOTE ADULT - ATTENDING COMMENTS
35 yo woman with bipolar ds, Factor V Leiden presenting with chills and decreased movement left arm found to have abscess left deltoid muscle suspected due to IM injection.  Agree with plan for aspiration and culture.  Would change clinda to vancomycin 1 gm iv q 12 h  check trough prior to 3rd dose.  follow IR culture results.    ID service available over weekend.

## 2022-06-24 NOTE — PROGRESS NOTE ADULT - PROBLEM SELECTOR PLAN 10
SCD now, resume eliquis 5mg BID after procedure SCD now, started on lovenox pending possible intervention started on lovenox pending possible intervention

## 2022-06-24 NOTE — PROGRESS NOTE ADULT - ASSESSMENT
36F with a pmhx of intellectual disability, bipolar disorder, hypothyroidism and factor V Leiden (on Eliquis), recent I&D of Right shoulder abscess at [place] presenting with LEFT intramuscular abscess after IM injection    Recommendations:  -Repeat US from 6/23 Complex fluid collection in the left upper arm measuring 3.1 x 1.6 x 4.0 cm which is unchanged from admission  -Would reconsult IR for intramuscular abscess drainage given patient has continued pain and would be the least invasive option   - If unable IR unable to drain would consider OR for surgical I&D      PATIENT CONTACT NUMBERS:  Pt has appointed legal guardian for consent- Shante Raza [phone]  : Carol [phone]  : Belkis [phone]      Discussed with Dr. Orlin PETERSON TEAM SURGERY  [id number] 36F with a pmhx of intellectual disability, bipolar disorder, hypothyroidism and factor V Leiden (on Eliquis), recent I&D of Right shoulder abscess at [place] presenting with LEFT intramuscular abscess after IM injection    Recommendations:  - Repeat US from 6/23 Complex fluid collection in the left upper arm measuring 3.1 x 1.6 x 4.0 cm which is unchanged from admission  - Would reconsult IR for intramuscular abscess drainage given patient has continued pain and would be the least invasive option   - If unable IR unable to drain would consider OR for surgical I&D  - Call back if IR unable to drain     PATIENT CONTACT NUMBERS:  Pt has appointed legal guardian for consent- Shante Raza [phone]  : Carol [phone]  : Belkis [phone]    Discussed with Dr. Orlin PETERSON TEAM SURGERY  [id number]

## 2022-06-24 NOTE — PROGRESS NOTE ADULT - SUBJECTIVE AND OBJECTIVE BOX
B TEAM SURGERY     Patient is a 37y old  Female who presents with a chief complaint of L shoulder pain (24 Jun 2022 07:16)    SUBJECTIVE:   Pt seen and examined at bedside. C/o left shoulder pain       Vital Signs Last 24 Hrs  T(C): 36.5 (24 Jun 2022 11:29), Max: 36.5 (24 Jun 2022 11:29)  T(F): 97.7 (24 Jun 2022 11:29), Max: 97.7 (24 Jun 2022 11:29)  HR: 99 (24 Jun 2022 11:29) (93 - 123)  BP: 106/64 (24 Jun 2022 11:29) (99/59 - 107/63)  BP(mean): --  RR: 17 (24 Jun 2022 11:29) (17 - 17)  SpO2: 99% (24 Jun 2022 11:29) (96% - 99%)  I&O's Detail    MEDICATIONS  (STANDING):  ARIPiprazole 30 milliGRAM(s) Oral daily  chlorhexidine 2% Cloths 1 Application(s) Topical daily  chlorproMAZINE    Tablet 50 milliGRAM(s) Oral <User Schedule>  clindamycin IVPB 600 milliGRAM(s) IV Intermittent every 8 hours  diVALproex  milliGRAM(s) Oral daily  diVALproex ER 1000 milliGRAM(s) Oral at bedtime  levothyroxine 75 MICROGram(s) Oral daily  LORazepam     Tablet 0.5 milliGRAM(s) Oral <User Schedule>  lurasidone 40 milliGRAM(s) Oral daily  melatonin 3 milliGRAM(s) Oral at bedtime  multivitamin/minerals/iron Oral Solution (CENTRUM) 15 milliLiter(s) Oral daily  pantoprazole   Suspension 40 milliGRAM(s) Oral daily  polyethylene glycol 3350 Oral Powder - Peds 17 Gram(s) Oral daily  QUEtiapine 200 milliGRAM(s) Oral two times a day  QUEtiapine 50 milliGRAM(s) Oral <User Schedule>    MEDICATIONS  (PRN):  acetaminophen     Tablet .. 650 milliGRAM(s) Oral every 6 hours PRN Temp greater or equal to 38C (100.4F), Mild Pain (1 - 3)  ALBUTerol    90 MICROgram(s) HFA Inhaler 2 Puff(s) Inhalation every 6 hours PRN Shortness of Breath and/or Wheezing  chlorproMAZINE    Injectable 50 milliGRAM(s) IntraMuscular every 4 hours PRN Agitation  diphenhydrAMINE Elixir 25 milliGRAM(s) Oral every 8 hours PRN Itching  LORazepam   Injectable 2 milliGRAM(s) IntraMuscular every 4 hours PRN Severe Agitation  oxyCODONE    IR 10 milliGRAM(s) Oral every 4 hours PRN Severe Pain (7 - 10)  oxyCODONE    IR 5 milliGRAM(s) Oral every 4 hours PRN Moderate Pain (4 - 6)      Physical Exam  General: Alert, NAD  LUE: Left upper arm indurated with some tenderness on palpation. No erythema or crepitus noted.       LABS:

## 2022-06-24 NOTE — PROGRESS NOTE ADULT - PROBLEM SELECTOR PLAN 9
- Hx of RLE DVT, on Eliquis 5mg BID outpatient   - s/p lovenox pending I/D procedure, restart Eliquis 5mg BID - Hx of RLE DVT, on Eliquis 5mg BID outpatient   - started on lovenox

## 2022-06-25 PROCEDURE — 99232 SBSQ HOSP IP/OBS MODERATE 35: CPT

## 2022-06-25 RX ADMIN — ENOXAPARIN SODIUM 120 MILLIGRAM(S): 100 INJECTION SUBCUTANEOUS at 06:46

## 2022-06-25 RX ADMIN — ARIPIPRAZOLE 30 MILLIGRAM(S): 15 TABLET ORAL at 12:43

## 2022-06-25 RX ADMIN — Medication 15 MILLILITER(S): at 12:44

## 2022-06-25 RX ADMIN — Medication 166.67 MILLIGRAM(S): at 06:47

## 2022-06-25 RX ADMIN — Medication 50 MILLIGRAM(S): at 06:47

## 2022-06-25 RX ADMIN — Medication 50 MILLIGRAM(S): at 23:59

## 2022-06-25 RX ADMIN — OXYCODONE HYDROCHLORIDE 10 MILLIGRAM(S): 5 TABLET ORAL at 17:10

## 2022-06-25 RX ADMIN — OXYCODONE HYDROCHLORIDE 10 MILLIGRAM(S): 5 TABLET ORAL at 17:40

## 2022-06-25 RX ADMIN — LURASIDONE HYDROCHLORIDE 40 MILLIGRAM(S): 40 TABLET ORAL at 12:43

## 2022-06-25 RX ADMIN — OXYCODONE HYDROCHLORIDE 10 MILLIGRAM(S): 5 TABLET ORAL at 22:47

## 2022-06-25 RX ADMIN — QUETIAPINE FUMARATE 200 MILLIGRAM(S): 200 TABLET, FILM COATED ORAL at 06:47

## 2022-06-25 RX ADMIN — OXYCODONE HYDROCHLORIDE 10 MILLIGRAM(S): 5 TABLET ORAL at 12:41

## 2022-06-25 RX ADMIN — QUETIAPINE FUMARATE 50 MILLIGRAM(S): 200 TABLET, FILM COATED ORAL at 12:44

## 2022-06-25 RX ADMIN — Medication 75 MICROGRAM(S): at 06:47

## 2022-06-25 RX ADMIN — Medication 0.5 MILLIGRAM(S): at 17:10

## 2022-06-25 RX ADMIN — DIVALPROEX SODIUM 1000 MILLIGRAM(S): 500 TABLET, DELAYED RELEASE ORAL at 21:47

## 2022-06-25 RX ADMIN — OXYCODONE HYDROCHLORIDE 10 MILLIGRAM(S): 5 TABLET ORAL at 13:10

## 2022-06-25 RX ADMIN — Medication 50 MILLIGRAM(S): at 20:47

## 2022-06-25 RX ADMIN — ENOXAPARIN SODIUM 120 MILLIGRAM(S): 100 INJECTION SUBCUTANEOUS at 17:51

## 2022-06-25 RX ADMIN — OXYCODONE HYDROCHLORIDE 5 MILLIGRAM(S): 5 TABLET ORAL at 06:54

## 2022-06-25 RX ADMIN — QUETIAPINE FUMARATE 200 MILLIGRAM(S): 200 TABLET, FILM COATED ORAL at 17:12

## 2022-06-25 RX ADMIN — Medication 0.5 MILLIGRAM(S): at 07:11

## 2022-06-25 RX ADMIN — OXYCODONE HYDROCHLORIDE 10 MILLIGRAM(S): 5 TABLET ORAL at 21:47

## 2022-06-25 RX ADMIN — Medication 3 MILLIGRAM(S): at 21:47

## 2022-06-25 RX ADMIN — Medication 50 MILLIGRAM(S): at 12:47

## 2022-06-25 RX ADMIN — DIVALPROEX SODIUM 500 MILLIGRAM(S): 500 TABLET, DELAYED RELEASE ORAL at 12:43

## 2022-06-25 RX ADMIN — POLYETHYLENE GLYCOL 3350 17 GRAM(S): 17 POWDER, FOR SOLUTION ORAL at 12:44

## 2022-06-25 RX ADMIN — Medication 300 MILLIGRAM(S): at 17:14

## 2022-06-25 RX ADMIN — PANTOPRAZOLE SODIUM 40 MILLIGRAM(S): 20 TABLET, DELAYED RELEASE ORAL at 12:50

## 2022-06-25 RX ADMIN — Medication 25 MILLIGRAM(S): at 15:19

## 2022-06-25 NOTE — PROGRESS NOTE ADULT - PROBLEM SELECTOR PLAN 8
RESOLVED  Plt downtrending since admission; plt now stable 2/2 lab error   - differential includes sepsis vs hep products (low clinical suspicion) vs Depakote induced (low suspicion)   - started on lovenox

## 2022-06-25 NOTE — PROGRESS NOTE ADULT - PROBLEM SELECTOR PLAN 1
Assessment:  - patient presented with abscess of the L shoulder secondary to IM injection  - no leukocytosis however is tachycardic and mildly low BP  - previously had R shoulder abscess in the past  - CT L shoulder show Left upper extremity abscess closely associated with the deltoid musculature. A couple foci of subcutaneous gas along the inferior margin of the collection are most likely related to recent upper extremity injection.   - surgery consulted, no indication for intervention, negative for nec fasc   - D/c vancomycin, s/p clindamycin 600 TID IV  - Ultrasound demonstrating R arm abscess   - (6/24) - Decision was made today by IR team to hold off on I/D procedure; Ethics consulted to help assist with procedural clearance; will need to have both legal guardian and state board clearance per ethics curbside consultation. Pending official consult  - ID consulted, rec switching clindamycin to vanc

## 2022-06-25 NOTE — PROGRESS NOTE ADULT - SUBJECTIVE AND OBJECTIVE BOX
Patient is a 37y old  Female who presents with a chief complaint of L shoulder pain (24 Jun 2022 15:30)      SUBJECTIVE / OVERNIGHT EVENTS:    12 point ROS negative except as noted above.     MEDICATIONS  (STANDING):  ARIPiprazole 30 milliGRAM(s) Oral daily  chlorhexidine 2% Cloths 1 Application(s) Topical daily  chlorproMAZINE    Tablet 50 milliGRAM(s) Oral <User Schedule>  diVALproex  milliGRAM(s) Oral daily  diVALproex ER 1000 milliGRAM(s) Oral at bedtime  enoxaparin Injectable 120 milliGRAM(s) SubCutaneous every 12 hours  levothyroxine 75 MICROGram(s) Oral daily  LORazepam     Tablet 0.5 milliGRAM(s) Oral <User Schedule>  lurasidone 40 milliGRAM(s) Oral daily  melatonin 3 milliGRAM(s) Oral at bedtime  multivitamin/minerals/iron Oral Solution (CENTRUM) 15 milliLiter(s) Oral daily  pantoprazole   Suspension 40 milliGRAM(s) Oral daily  polyethylene glycol 3350 Oral Powder - Peds 17 Gram(s) Oral daily  QUEtiapine 200 milliGRAM(s) Oral two times a day  QUEtiapine 50 milliGRAM(s) Oral <User Schedule>  vancomycin  IVPB      vancomycin  IVPB 1250 milliGRAM(s) IV Intermittent every 12 hours    MEDICATIONS  (PRN):  acetaminophen     Tablet .. 650 milliGRAM(s) Oral every 6 hours PRN Temp greater or equal to 38C (100.4F), Mild Pain (1 - 3)  ALBUTerol    90 MICROgram(s) HFA Inhaler 2 Puff(s) Inhalation every 6 hours PRN Shortness of Breath and/or Wheezing  chlorproMAZINE    Injectable 50 milliGRAM(s) IntraMuscular every 4 hours PRN Agitation  diphenhydrAMINE Elixir 25 milliGRAM(s) Oral every 8 hours PRN Itching  LORazepam   Injectable 2 milliGRAM(s) IntraMuscular every 4 hours PRN Severe Agitation  oxyCODONE    IR 10 milliGRAM(s) Oral every 4 hours PRN Severe Pain (7 - 10)  oxyCODONE    IR 5 milliGRAM(s) Oral every 4 hours PRN Moderate Pain (4 - 6)      CAPILLARY BLOOD GLUCOSE        I&O's Summary      Vital Signs Last 24 Hrs  T(C): 36.8 (25 Jun 2022 06:19), Max: 36.8 (25 Jun 2022 06:19)  T(F): 98.2 (25 Jun 2022 06:19), Max: 98.2 (25 Jun 2022 06:19)  HR: 100 (25 Jun 2022 06:19) (98 - 100)  BP: 110/68 (25 Jun 2022 06:19) (101/61 - 110/68)  BP(mean): --  RR: 19 (25 Jun 2022 06:19) (17 - 19)  SpO2: 97% (25 Jun 2022 06:19) (96% - 99%)    PHYSICAL EXAM:  GENERAL: NAD, well-developed, well-nourished  HEAD: Atraumatic, Normocephalic  EYES: EOMI, PERRLA, conjunctiva and sclera clear  NECK: Supple, No JVD  CHEST/LUNG: Clear to auscultation bilaterally; No wheezes or crackles  HEART: Normal S1/S2; Regular rate and rhythm; No murmurs, rubs, or gallops  ABDOMEN: Soft, Nontender, Nondistended; Bowel sounds present  EXTREMITIES: 2+ Peripheral Pulses; No clubbing, cyanosis, or edema  PSYCH: A&Ox3  NEUROLOGY: no focal neurologic deficit  SKIN: No rashes or lesions    LABS:                     RADIOLOGY & ADDITIONAL TESTS:    Imaging Personally Reviewed:    Consultant(s) Notes Reviewed:      Care Discussed with Consultants/Other Providers:   Patient is a 37y old  Female who presents with a chief complaint of L shoulder pain (24 Jun 2022 15:30)      SUBJECTIVE / OVERNIGHT EVENTS: overnight nursing noted pain and swelling in RUE, concern that IV may be infiltrating. This AM, patient endorses the same, also complains of some abdominal pain in central quadrants. Denies nausea/vomiting, chest pain, dyspnea, diarrhea/constipation, dysuria.     12 point ROS negative except as noted above.     MEDICATIONS  (STANDING):  ARIPiprazole 30 milliGRAM(s) Oral daily  chlorhexidine 2% Cloths 1 Application(s) Topical daily  chlorproMAZINE    Tablet 50 milliGRAM(s) Oral <User Schedule>  diVALproex  milliGRAM(s) Oral daily  diVALproex ER 1000 milliGRAM(s) Oral at bedtime  enoxaparin Injectable 120 milliGRAM(s) SubCutaneous every 12 hours  levothyroxine 75 MICROGram(s) Oral daily  LORazepam     Tablet 0.5 milliGRAM(s) Oral <User Schedule>  lurasidone 40 milliGRAM(s) Oral daily  melatonin 3 milliGRAM(s) Oral at bedtime  multivitamin/minerals/iron Oral Solution (CENTRUM) 15 milliLiter(s) Oral daily  pantoprazole   Suspension 40 milliGRAM(s) Oral daily  polyethylene glycol 3350 Oral Powder - Peds 17 Gram(s) Oral daily  QUEtiapine 200 milliGRAM(s) Oral two times a day  QUEtiapine 50 milliGRAM(s) Oral <User Schedule>  vancomycin  IVPB      vancomycin  IVPB 1250 milliGRAM(s) IV Intermittent every 12 hours    MEDICATIONS  (PRN):  acetaminophen     Tablet .. 650 milliGRAM(s) Oral every 6 hours PRN Temp greater or equal to 38C (100.4F), Mild Pain (1 - 3)  ALBUTerol    90 MICROgram(s) HFA Inhaler 2 Puff(s) Inhalation every 6 hours PRN Shortness of Breath and/or Wheezing  chlorproMAZINE    Injectable 50 milliGRAM(s) IntraMuscular every 4 hours PRN Agitation  diphenhydrAMINE Elixir 25 milliGRAM(s) Oral every 8 hours PRN Itching  LORazepam   Injectable 2 milliGRAM(s) IntraMuscular every 4 hours PRN Severe Agitation  oxyCODONE    IR 10 milliGRAM(s) Oral every 4 hours PRN Severe Pain (7 - 10)  oxyCODONE    IR 5 milliGRAM(s) Oral every 4 hours PRN Moderate Pain (4 - 6)      CAPILLARY BLOOD GLUCOSE        I&O's Summary      Vital Signs Last 24 Hrs  T(C): 36.8 (25 Jun 2022 06:19), Max: 36.8 (25 Jun 2022 06:19)  T(F): 98.2 (25 Jun 2022 06:19), Max: 98.2 (25 Jun 2022 06:19)  HR: 100 (25 Jun 2022 06:19) (98 - 100)  BP: 110/68 (25 Jun 2022 06:19) (101/61 - 110/68)  BP(mean): --  RR: 19 (25 Jun 2022 06:19) (17 - 19)  SpO2: 97% (25 Jun 2022 06:19) (96% - 99%)    PHYSICAL EXAM:  GENERAL: NAD, well-developed, well-nourished  HEAD: Atraumatic, Normocephalic  EYES: EOMI, PERRLA, conjunctiva and sclera clear  NECK: Supple, No JVD  CHEST/LUNG: Clear to auscultation bilaterally; No wheezes or crackles  HEART: Normal S1/S2; Regular rate and rhythm; No murmurs, rubs, or gallops  ABDOMEN: Soft, Nontender, Nondistended; Bowel sounds present  EXTREMITIES: 2+ Peripheral Pulses; No clubbing, cyanosis, or edema. RUE: swollen from forearm to hand vs LUE, with pain to palpation. Warm, pulses intact, movement and sensation intact, strength for flexion/extension, finger squeeze diminished vs L  PSYCH: A&Ox3  NEUROLOGY: no focal neurologic deficit  SKIN: No rashes or lesions    LABS:                     RADIOLOGY & ADDITIONAL TESTS:    Imaging Personally Reviewed:    Consultant(s) Notes Reviewed:      Care Discussed with Consultants/Other Providers:

## 2022-06-26 DIAGNOSIS — R30.0 DYSURIA: ICD-10-CM

## 2022-06-26 LAB
APPEARANCE UR: CLEAR — SIGNIFICANT CHANGE UP
BILIRUB UR-MCNC: NEGATIVE — SIGNIFICANT CHANGE UP
COLOR SPEC: YELLOW — SIGNIFICANT CHANGE UP
DIFF PNL FLD: NEGATIVE — SIGNIFICANT CHANGE UP
GLUCOSE UR QL: ABNORMAL
KETONES UR-MCNC: NEGATIVE — SIGNIFICANT CHANGE UP
LEUKOCYTE ESTERASE UR-ACNC: NEGATIVE — SIGNIFICANT CHANGE UP
NITRITE UR-MCNC: NEGATIVE — SIGNIFICANT CHANGE UP
PH UR: 6.5 — SIGNIFICANT CHANGE UP (ref 5–8)
PROT UR-MCNC: ABNORMAL
SP GR SPEC: 1.03 — SIGNIFICANT CHANGE UP (ref 1–1.05)
UROBILINOGEN FLD QL: ABNORMAL

## 2022-06-26 PROCEDURE — 99232 SBSQ HOSP IP/OBS MODERATE 35: CPT

## 2022-06-26 RX ORDER — DIPHENHYDRAMINE HCL 50 MG
50 CAPSULE ORAL EVERY 8 HOURS
Refills: 0 | Status: DISCONTINUED | OUTPATIENT
Start: 2022-06-26 | End: 2022-07-02

## 2022-06-26 RX ORDER — ACETAMINOPHEN 500 MG
650 TABLET ORAL ONCE
Refills: 0 | Status: COMPLETED | OUTPATIENT
Start: 2022-06-26 | End: 2022-06-26

## 2022-06-26 RX ORDER — OXYCODONE HYDROCHLORIDE 5 MG/1
10 TABLET ORAL EVERY 4 HOURS
Refills: 0 | Status: DISCONTINUED | OUTPATIENT
Start: 2022-06-26 | End: 2022-07-01

## 2022-06-26 RX ORDER — OXYCODONE HYDROCHLORIDE 5 MG/1
5 TABLET ORAL EVERY 4 HOURS
Refills: 0 | Status: DISCONTINUED | OUTPATIENT
Start: 2022-06-26 | End: 2022-07-01

## 2022-06-26 RX ADMIN — Medication 3 MILLIGRAM(S): at 22:31

## 2022-06-26 RX ADMIN — ENOXAPARIN SODIUM 120 MILLIGRAM(S): 100 INJECTION SUBCUTANEOUS at 06:33

## 2022-06-26 RX ADMIN — OXYCODONE HYDROCHLORIDE 10 MILLIGRAM(S): 5 TABLET ORAL at 18:22

## 2022-06-26 RX ADMIN — Medication 15 MILLILITER(S): at 11:18

## 2022-06-26 RX ADMIN — OXYCODONE HYDROCHLORIDE 10 MILLIGRAM(S): 5 TABLET ORAL at 09:40

## 2022-06-26 RX ADMIN — POLYETHYLENE GLYCOL 3350 17 GRAM(S): 17 POWDER, FOR SOLUTION ORAL at 11:17

## 2022-06-26 RX ADMIN — Medication 650 MILLIGRAM(S): at 00:19

## 2022-06-26 RX ADMIN — OXYCODONE HYDROCHLORIDE 10 MILLIGRAM(S): 5 TABLET ORAL at 08:40

## 2022-06-26 RX ADMIN — Medication 75 MICROGRAM(S): at 06:34

## 2022-06-26 RX ADMIN — Medication 50 MILLIGRAM(S): at 23:17

## 2022-06-26 RX ADMIN — ARIPIPRAZOLE 30 MILLIGRAM(S): 15 TABLET ORAL at 11:16

## 2022-06-26 RX ADMIN — Medication 50 MILLIGRAM(S): at 22:31

## 2022-06-26 RX ADMIN — DIVALPROEX SODIUM 500 MILLIGRAM(S): 500 TABLET, DELAYED RELEASE ORAL at 11:16

## 2022-06-26 RX ADMIN — Medication 50 MILLIGRAM(S): at 20:08

## 2022-06-26 RX ADMIN — OXYCODONE HYDROCHLORIDE 10 MILLIGRAM(S): 5 TABLET ORAL at 23:31

## 2022-06-26 RX ADMIN — OXYCODONE HYDROCHLORIDE 5 MILLIGRAM(S): 5 TABLET ORAL at 04:25

## 2022-06-26 RX ADMIN — QUETIAPINE FUMARATE 200 MILLIGRAM(S): 200 TABLET, FILM COATED ORAL at 06:33

## 2022-06-26 RX ADMIN — Medication 650 MILLIGRAM(S): at 01:19

## 2022-06-26 RX ADMIN — ENOXAPARIN SODIUM 120 MILLIGRAM(S): 100 INJECTION SUBCUTANEOUS at 17:22

## 2022-06-26 RX ADMIN — OXYCODONE HYDROCHLORIDE 10 MILLIGRAM(S): 5 TABLET ORAL at 13:43

## 2022-06-26 RX ADMIN — OXYCODONE HYDROCHLORIDE 10 MILLIGRAM(S): 5 TABLET ORAL at 12:43

## 2022-06-26 RX ADMIN — Medication 300 MILLIGRAM(S): at 17:22

## 2022-06-26 RX ADMIN — QUETIAPINE FUMARATE 200 MILLIGRAM(S): 200 TABLET, FILM COATED ORAL at 18:52

## 2022-06-26 RX ADMIN — OXYCODONE HYDROCHLORIDE 10 MILLIGRAM(S): 5 TABLET ORAL at 17:22

## 2022-06-26 RX ADMIN — DIVALPROEX SODIUM 1000 MILLIGRAM(S): 500 TABLET, DELAYED RELEASE ORAL at 22:30

## 2022-06-26 RX ADMIN — Medication 300 MILLIGRAM(S): at 23:16

## 2022-06-26 RX ADMIN — Medication 300 MILLIGRAM(S): at 11:14

## 2022-06-26 RX ADMIN — Medication 300 MILLIGRAM(S): at 00:19

## 2022-06-26 RX ADMIN — OXYCODONE HYDROCHLORIDE 10 MILLIGRAM(S): 5 TABLET ORAL at 22:31

## 2022-06-26 RX ADMIN — PANTOPRAZOLE SODIUM 40 MILLIGRAM(S): 20 TABLET, DELAYED RELEASE ORAL at 11:17

## 2022-06-26 RX ADMIN — Medication 50 MILLIGRAM(S): at 11:16

## 2022-06-26 RX ADMIN — QUETIAPINE FUMARATE 50 MILLIGRAM(S): 200 TABLET, FILM COATED ORAL at 11:17

## 2022-06-26 RX ADMIN — LURASIDONE HYDROCHLORIDE 40 MILLIGRAM(S): 40 TABLET ORAL at 11:15

## 2022-06-26 RX ADMIN — Medication 300 MILLIGRAM(S): at 06:33

## 2022-06-26 RX ADMIN — OXYCODONE HYDROCHLORIDE 5 MILLIGRAM(S): 5 TABLET ORAL at 03:25

## 2022-06-26 RX ADMIN — Medication 50 MILLIGRAM(S): at 06:34

## 2022-06-26 NOTE — DIETITIAN INITIAL EVALUATION ADULT - ADD RECOMMEND
1. Encourage weight reduction as outpatient. 2. Monitor weights, labs, BM's, skin integrity, p.o. intake and edema. 3. Follow pt as per protocol.

## 2022-06-26 NOTE — PROGRESS NOTE ADULT - PROBLEM SELECTOR PLAN 1
Assessment:  - patient presented with abscess of the L shoulder secondary to IM injection  - no leukocytosis however is tachycardic and mildly low BP  - previously had R shoulder abscess in the past  - CT L shoulder show Left upper extremity abscess closely associated with the deltoid musculature. A couple foci of subcutaneous gas along the inferior margin of the collection are most likely related to recent upper extremity injection.   - surgery consulted, no indication for intervention, negative for nec fasc   - D/c vancomycin, s/p clindamycin 600 TID IV  - Ultrasound demonstrating R arm abscess   - (6/24) - Decision was made today by IR team to hold off on I/D procedure; Ethics consulted to help assist with procedural clearance; will need to have both legal guardian and state board clearance per ethics curbside consultation. Pending official consult  - ID consulted, rec switching clindamycin to vanc Patient presented with abscess of the L shoulder secondary to IM injection. No leukocytosis however was tachycardic and mildly hypotensive.  - previously had R shoulder abscess in the past  - CT L shoulder shows LUE abscess closely associated with the deltoid musculature. A couple foci of subcutaneous gas along the inferior margin of the collection are most likely related to recent upper extremity injection.   - surgery consulted, no indication for intervention, negative for nec fasc   - Ultrasound demonstrating R arm abscess   - (6/24) - Decision was made by IR team to hold off on I/D procedure  - ID consulted, rec switching clindamycin to vanc  - 6/25 - Pt now w infiltrated IV, swelling of RUE and hand w/ suspected thrombophlebitis.  - Switched from IV vanco to oral clindamycin 2/2 infiltrated IV  - c/w oral clindamycin, awaiting midline for IV Abx  - c/w oxycodone PRN for pain Patient presented with abscess of the L shoulder secondary to IM injection. No leukocytosis however was tachycardic and mildly hypotensive.  - previously had R shoulder abscess in the past  - CT L shoulder shows LUE abscess closely associated with the deltoid musculature. A couple foci of subcutaneous gas along the inferior margin of the collection are most likely related to recent upper extremity injection.   - surgery consulted, no indication for intervention, negative for nec fasc   - Ultrasound demonstrating R arm abscess   - (6/24) - Decision was made by IR team to hold off on I/D procedure  - ID consulted, rec switching clindamycin to vanc  - 6/25 - Pt now w infiltrated IV, swelling of RUE and hand w/ suspected thrombophlebitis.  - Switched from IV vanco to oral clindamycin 2/2 infiltrated IV  - c/w oral clindamycin, plan for midline for IV vanco  - c/w oxycodone PRN for pain

## 2022-06-26 NOTE — PROGRESS NOTE ADULT - SUBJECTIVE AND OBJECTIVE BOX
***************************************************************  Fredrick Doyle, PGY1  Internal Medicine   [facility] Pager [phone]  [facility] Pager [phone]  ***************************************************************    YODIT MORROW  37y  MRN: [id number]  06-18-22 (8d)    Patient is a 37y old  Female who presents with a chief complaint of L shoulder pain (25 Jun 2022 07:28)      SUBJECTIVE / OVERNIGHT EVENTS: No acute overnight events. Pt seen and examined at bedside. Denies fevers, chills, CP, SOB, Abdominal pain, N/V, Constipation, Diarrhea. Last BM     12 Point ROS negative with the exception of the above    MEDICATIONS  (STANDING):  ARIPiprazole 30 milliGRAM(s) Oral daily  chlorhexidine 2% Cloths 1 Application(s) Topical daily  chlorproMAZINE    Tablet 50 milliGRAM(s) Oral <User Schedule>  clindamycin   Capsule 300 milliGRAM(s) Oral every 6 hours  diVALproex  milliGRAM(s) Oral daily  diVALproex ER 1000 milliGRAM(s) Oral at bedtime  enoxaparin Injectable 120 milliGRAM(s) SubCutaneous every 12 hours  levothyroxine 75 MICROGram(s) Oral daily  lurasidone 40 milliGRAM(s) Oral daily  melatonin 3 milliGRAM(s) Oral at bedtime  multivitamin/minerals/iron Oral Solution (CENTRUM) 15 milliLiter(s) Oral daily  pantoprazole   Suspension 40 milliGRAM(s) Oral daily  polyethylene glycol 3350 Oral Powder - Peds 17 Gram(s) Oral daily  QUEtiapine 200 milliGRAM(s) Oral two times a day  QUEtiapine 50 milliGRAM(s) Oral <User Schedule>    MEDICATIONS  (PRN):  acetaminophen     Tablet .. 650 milliGRAM(s) Oral every 6 hours PRN Temp greater or equal to 38C (100.4F), Mild Pain (1 - 3)  ALBUTerol    90 MICROgram(s) HFA Inhaler 2 Puff(s) Inhalation every 6 hours PRN Shortness of Breath and/or Wheezing  chlorproMAZINE    Injectable 50 milliGRAM(s) IntraMuscular every 4 hours PRN Agitation  diphenhydrAMINE Elixir 25 milliGRAM(s) Oral every 8 hours PRN Itching  oxyCODONE    IR 5 milliGRAM(s) Oral every 4 hours PRN Moderate Pain (4 - 6)      OBJECTIVE:  Vital Signs Last 24 Hrs  T(C): 36.8 (26 Jun 2022 06:31), Max: 37.2 (25 Jun 2022 21:32)  T(F): 98.2 (26 Jun 2022 06:31), Max: 98.9 (25 Jun 2022 21:32)  HR: 83 (26 Jun 2022 06:31) (77 - 97)  BP: 113/70 (26 Jun 2022 06:31) (98/54 - 113/70)  BP(mean): --  RR: 17 (26 Jun 2022 06:31) (17 - 19)  SpO2: 100% (26 Jun 2022 06:31) (95% - 100%)    I&O's Summary      PHYSICAL EXAM:  GENERAL: Laying comfortably, NAD  HEENT: NCAT, PERRLA, EOMI, no scleral icterus, no LAD  NECK: No JVD  LUNG: CTABL; No wheezes, crackles, or rhonchi  HEART: RRR; normal S1/S2; No murmurs, rubs, or gallops  ABDOMEN: +BS, soft, nontender, nondistended, no HSM; No rebound, guarding, or rigidity  EXTREMITIES:  No LE edema b/l, 2+ Peripheral Pulses, No clubbing, cyanosis, or edema  NEUROLOGY: AOx3, non-focal, strength 5/5 in all extremities, sensation intact  PSYCH: calm and cooperative  SKIN: No rashes or lesions    LABS:                      CAPILLARY BLOOD GLUCOSE            RADIOLOGY & ADDITIONAL TESTS:      Imaging Personally Reviewed: [ ] YES  [ ] NO      Consultants involved in case:   Consultant(s) Notes Reviewed:  [ ] YES  [ ] NO:   Care Discussed with Consultants/Other Providers [ ] YES  [ ] NO ***************************************************************  Fredrick Doyle, PGY1  Internal Medicine   [facility] Pager [phone]  [facility] Pager [phone]  ***************************************************************    YODIT MORROW  37y  MRN: [id number]  06-18-22 (8d)    Patient is a 37y old  Female who presents with a chief complaint of L shoulder pain (25 Jun 2022 07:28)      SUBJECTIVE / OVERNIGHT EVENTS:   Overnight, patient complaining of LLE pain and swelling. Patient examined by night team and given Tylenol for pain.   Pt seen and examined at bedside this morning. Denies fevers, chills, CP, SOB, N/V. Last Bm today. Episode of nausea yesterday that has since resolved, Reports L shoulder pain, R shoulder to hand pain, lower abdominal pain, burring in both feet this morning, LLE pain on lateral side of leg close to her knee, and dysuria yesterday that has since resolved.     12 Point ROS negative with the exception of the above    MEDICATIONS  (STANDING):  ARIPiprazole 30 milliGRAM(s) Oral daily  chlorhexidine 2% Cloths 1 Application(s) Topical daily  chlorproMAZINE    Tablet 50 milliGRAM(s) Oral <User Schedule>  clindamycin   Capsule 300 milliGRAM(s) Oral every 6 hours  diVALproex  milliGRAM(s) Oral daily  diVALproex ER 1000 milliGRAM(s) Oral at bedtime  enoxaparin Injectable 120 milliGRAM(s) SubCutaneous every 12 hours  levothyroxine 75 MICROGram(s) Oral daily  lurasidone 40 milliGRAM(s) Oral daily  melatonin 3 milliGRAM(s) Oral at bedtime  multivitamin/minerals/iron Oral Solution (CENTRUM) 15 milliLiter(s) Oral daily  pantoprazole   Suspension 40 milliGRAM(s) Oral daily  polyethylene glycol 3350 Oral Powder - Peds 17 Gram(s) Oral daily  QUEtiapine 200 milliGRAM(s) Oral two times a day  QUEtiapine 50 milliGRAM(s) Oral <User Schedule>    MEDICATIONS  (PRN):  acetaminophen     Tablet .. 650 milliGRAM(s) Oral every 6 hours PRN Temp greater or equal to 38C (100.4F), Mild Pain (1 - 3)  ALBUTerol    90 MICROgram(s) HFA Inhaler 2 Puff(s) Inhalation every 6 hours PRN Shortness of Breath and/or Wheezing  chlorproMAZINE    Injectable 50 milliGRAM(s) IntraMuscular every 4 hours PRN Agitation  diphenhydrAMINE Elixir 25 milliGRAM(s) Oral every 8 hours PRN Itching  oxyCODONE    IR 5 milliGRAM(s) Oral every 4 hours PRN Moderate Pain (4 - 6)      OBJECTIVE:  Vital Signs Last 24 Hrs  T(C): 36.8 (26 Jun 2022 06:31), Max: 37.2 (25 Jun 2022 21:32)  T(F): 98.2 (26 Jun 2022 06:31), Max: 98.9 (25 Jun 2022 21:32)  HR: 83 (26 Jun 2022 06:31) (77 - 97)  BP: 113/70 (26 Jun 2022 06:31) (98/54 - 113/70)  RR: 17 (26 Jun 2022 06:31) (17 - 19)  SpO2: 100% (26 Jun 2022 06:31) (95% - 100%)    PHYSICAL EXAM:  GENERAL: Laying comfortably, NAD  HEENT: NCAT, PERRLA, EOMI, no scleral icterus, no LAD  LUNG: CTABL; No wheezes, crackles, or rhonchi  HEART: RRR; normal S1/S2; No murmurs, rubs, or gallops  ABDOMEN: +BS, soft, nondistended, mild lower mid-abdominal tenderness  EXTREMITIES:  2+ peripheral pulses throughout; RUE: edema from forearm to hand compared to LUE, with pain to palpation, warm, pulses intact, movement and sensation intact, strength for flexion/extension, R hand finger squeeze diminished vs L. LLE edema > RLE.   NEUROLOGY: AOx3, non-focal, strength 5/5 in all extremities, sensation intact  PSYCH: calm and cooperative  SKIN: No rashes or lesions    LABS:      Consultants involved in case:   Consultant(s) Notes Reviewed:  [ ] YES  [ ] NO:   Care Discussed with Consultants/Other Providers [ ] YES  [ ] NO ***************************************************************  Fredrick Doyle, PGY1  Internal Medicine   [facility] Pager [phone]  [facility] Pager [phone]  ***************************************************************    YODIT MORROW  37y  MRN: [id number]  06-18-22 (8d)    Patient is a 37y old  Female who presents with a chief complaint of L shoulder pain (25 Jun 2022 07:28)      SUBJECTIVE / OVERNIGHT EVENTS:   Overnight, patient complaining of LLE pain and swelling. Patient examined by night team and given Tylenol for pain.   Pt seen and examined at bedside this morning. Denies fevers, chills, CP, SOB, N/V. Last Bm today. Episode of nausea yesterday that has since resolved, Reports L shoulder pain, R shoulder to hand pain, lower abdominal pain, burring in both feet this morning, LLE pain on lateral side of leg close to her knee, and dysuria yesterday that has since resolved.     12 Point ROS negative with the exception of the above    MEDICATIONS  (STANDING):  ARIPiprazole 30 milliGRAM(s) Oral daily  chlorhexidine 2% Cloths 1 Application(s) Topical daily  chlorproMAZINE    Tablet 50 milliGRAM(s) Oral <User Schedule>  clindamycin   Capsule 300 milliGRAM(s) Oral every 6 hours  diVALproex  milliGRAM(s) Oral daily  diVALproex ER 1000 milliGRAM(s) Oral at bedtime  enoxaparin Injectable 120 milliGRAM(s) SubCutaneous every 12 hours  levothyroxine 75 MICROGram(s) Oral daily  lurasidone 40 milliGRAM(s) Oral daily  melatonin 3 milliGRAM(s) Oral at bedtime  multivitamin/minerals/iron Oral Solution (CENTRUM) 15 milliLiter(s) Oral daily  pantoprazole   Suspension 40 milliGRAM(s) Oral daily  polyethylene glycol 3350 Oral Powder - Peds 17 Gram(s) Oral daily  QUEtiapine 200 milliGRAM(s) Oral two times a day  QUEtiapine 50 milliGRAM(s) Oral <User Schedule>    MEDICATIONS  (PRN):  acetaminophen     Tablet .. 650 milliGRAM(s) Oral every 6 hours PRN Temp greater or equal to 38C (100.4F), Mild Pain (1 - 3)  ALBUTerol    90 MICROgram(s) HFA Inhaler 2 Puff(s) Inhalation every 6 hours PRN Shortness of Breath and/or Wheezing  chlorproMAZINE    Injectable 50 milliGRAM(s) IntraMuscular every 4 hours PRN Agitation  diphenhydrAMINE Elixir 25 milliGRAM(s) Oral every 8 hours PRN Itching  oxyCODONE    IR 5 milliGRAM(s) Oral every 4 hours PRN Moderate Pain (4 - 6)      OBJECTIVE:  Vital Signs Last 24 Hrs  T(C): 36.8 (26 Jun 2022 06:31), Max: 37.2 (25 Jun 2022 21:32)  T(F): 98.2 (26 Jun 2022 06:31), Max: 98.9 (25 Jun 2022 21:32)  HR: 83 (26 Jun 2022 06:31) (77 - 97)  BP: 113/70 (26 Jun 2022 06:31) (98/54 - 113/70)  RR: 17 (26 Jun 2022 06:31) (17 - 19)  SpO2: 100% (26 Jun 2022 06:31) (95% - 100%)    PHYSICAL EXAM:  GENERAL: Laying comfortably, NAD  HEENT: NCAT, PERRLA, EOMI, no scleral icterus, no LAD  LUNG: CTABL; No wheezes, crackles, or rhonchi  HEART: RRR; normal S1/S2; No murmurs, rubs, or gallops  ABDOMEN: +BS, soft, nondistended, mild lower mid-abdominal tenderness  EXTREMITIES:  2+ peripheral pulses throughout; RUE: edema from forearm to hand compared to LUE, with pain to palpation, warm, pulses intact, movement and sensation intact, strength for flexion/extension, R hand finger squeeze diminished vs L. LLE edema > RLE.   NEUROLOGY: AOx3, non-focal, strength 5/5 in all extremities, sensation intact  PSYCH: calm and cooperative  SKIN: No rashes or lesions    LABS:      Consultants involved in case:   Consultant(s) Notes Reviewed:  [ ] YES  [ ] NO:   Care Discussed with Consultants/Other Providers [ ] YES  [ ] NO ***************************************************************  Fredrick Doyle, PGY1  Internal Medicine   [facility] Pager [phone]  [facility] Pager [phone]  ***************************************************************    YODIT MORROW  37y  MRN: [id number]  22 (8d)    Patient is a 37y old  Female who presents with a chief complaint of L shoulder pain (2022 07:28)      SUBJECTIVE / OVERNIGHT EVENTS:   Overnight, patient complaining of LLE pain and swelling. Patient examined by night team and given Tylenol for pain.   Pt seen and examined at bedside this morning. Denies fevers, chills, CP, SOB, N/V. Last Bm today. Episode of nausea yesterday that has since resolved, Reports L shoulder pain, R shoulder to hand pain, lower abdominal pain, burring in both feet this morning, LLE pain on lateral side of leg close to her knee, and dysuria yesterday that has since resolved.     12 Point ROS negative with the exception of the above    MEDICATIONS  (STANDING):  ARIPiprazole 30 milliGRAM(s) Oral daily  chlorhexidine 2% Cloths 1 Application(s) Topical daily  chlorproMAZINE    Tablet 50 milliGRAM(s) Oral <User Schedule>  clindamycin   Capsule 300 milliGRAM(s) Oral every 6 hours  diVALproex  milliGRAM(s) Oral daily  diVALproex ER 1000 milliGRAM(s) Oral at bedtime  enoxaparin Injectable 120 milliGRAM(s) SubCutaneous every 12 hours  levothyroxine 75 MICROGram(s) Oral daily  lurasidone 40 milliGRAM(s) Oral daily  melatonin 3 milliGRAM(s) Oral at bedtime  multivitamin/minerals/iron Oral Solution (CENTRUM) 15 milliLiter(s) Oral daily  pantoprazole   Suspension 40 milliGRAM(s) Oral daily  polyethylene glycol 3350 Oral Powder - Peds 17 Gram(s) Oral daily  QUEtiapine 200 milliGRAM(s) Oral two times a day  QUEtiapine 50 milliGRAM(s) Oral <User Schedule>    MEDICATIONS  (PRN):  acetaminophen     Tablet .. 650 milliGRAM(s) Oral every 6 hours PRN Temp greater or equal to 38C (100.4F), Mild Pain (1 - 3)  ALBUTerol    90 MICROgram(s) HFA Inhaler 2 Puff(s) Inhalation every 6 hours PRN Shortness of Breath and/or Wheezing  chlorproMAZINE    Injectable 50 milliGRAM(s) IntraMuscular every 4 hours PRN Agitation  diphenhydrAMINE Elixir 25 milliGRAM(s) Oral every 8 hours PRN Itching  oxyCODONE    IR 5 milliGRAM(s) Oral every 4 hours PRN Moderate Pain (4 - 6)      OBJECTIVE:  Vital Signs Last 24 Hrs  T(C): 36.8 (2022 06:31), Max: 37.2 (2022 21:32)  T(F): 98.2 (2022 06:31), Max: 98.9 (2022 21:32)  HR: 83 (2022 06:31) (77 - 97)  BP: 113/70 (2022 06:31) (98/54 - 113/70)  RR: 17 (2022 06:31) (17 - 19)  SpO2: 100% (2022 06:31) (95% - 100%)    PHYSICAL EXAM:  GENERAL: Laying comfortably, NAD  HEENT: NCAT, PERRLA, EOMI, no scleral icterus, no LAD  LUNG: CTABL; No wheezes, crackles, or rhonchi  HEART: RRR; normal S1/S2; No murmurs, rubs, or gallops  ABDOMEN: +BS, soft, nondistended, mild lower mid-abdominal tenderness  EXTREMITIES:  2+ peripheral pulses throughout; RUE: edema from forearm to hand compared to LUE, with pain to palpation, warm, pulses intact, movement and sensation intact, strength for flexion/extension, R hand finger squeeze diminished vs L. LLE edema > RLE.   NEUROLOGY: AOx3, non-focal, strength 5/5 in all extremities, sensation intact  PSYCH: calm and cooperative  SKIN: No rashes or lesions    LABS:  Urinalysis Basic - ( 2022 14:06 )  Color: Yellow / Appearance: Clear / S.027 / pH: x  Gluc: x / Ketone: Negative  / Bili: Negative / Urobili: 3 mg/dL   Blood: x / Protein: Trace / Nitrite: Negative   Leuk Esterase: Negative / RBC: 1 /HPF / WBC 1 /HPF   Sq Epi: x / Non Sq Epi: 1 /HPF / Bacteria: x

## 2022-06-26 NOTE — PROGRESS NOTE ADULT - PROBLEM SELECTOR PLAN 7
Patient has Hx of recurrent seizure like activity; Has been evaluated by neurology in the past with negative EEG: Activity consistent with pseudoseizure   - S/p RRT for jerking movement in upper extremities (6/21); resolve without medications being given   - Low concern for seizure given presentation and prior history, continue to monitor at this time Patient has Hx of recurrent seizure like activity; Has been evaluated by neurology in the past with negative EEG: Activity consistent with pseudoseizure   - S/p RRT for jerking movement in upper extremities (6/21); resolved without medications being given   - Low concern for seizure given presentation and prior history, continue to monitor at this time

## 2022-06-26 NOTE — DIETITIAN INITIAL EVALUATION ADULT - OTHER INFO
36 y/o female with hx bipolar disorder, and Factor V Leiden admitted with dx of L upper extremity abscess. Visited with pt to obtain nutrition hx. Pt eating well and has no GI distress at present. She endorsed a food allergy to pineapple - Food and Nutrition Dept aware. No weight changes noted PTA. PT is Morbidly Obese with BMI 41 kg/m2. Encouraged weight reduction management upon d/c to assist with medical issues and optimize general health. Pt appeared disinterested in working towards this recommendation. Request daily weights taken as pt with 1+ generalized and 2+ R arm/hand/leg/foot and L leg/foot edema. RDN services to remain available as needed.

## 2022-06-26 NOTE — PROGRESS NOTE ADULT - PROBLEM SELECTOR PLAN 8
RESOLVED  Plt downtrending since admission; plt now stable 2/2 lab error   - differential includes sepsis vs hep products (low clinical suspicion) vs Depakote induced (low suspicion)   - started on lovenox RESOLVED  Plt downtrending since admission; plt now stable 2/2 lab error   - differential includes sepsis vs hep products (low clinical suspicion) vs Depakote induced (low suspicion)   - c/w lovenox

## 2022-06-26 NOTE — CHART NOTE - NSCHARTNOTEFT_GEN_A_CORE
Saw patient when she complained of new LLE pain and swelling.  On examination, LLE appeared mildly edematous; patient states the swelling was improved from earlier in the day   Dorsalis Pedis and Posterior Tibial Pulses were present; felt warm and well perfused.    Ordered Tylenol for the pain.

## 2022-06-26 NOTE — PROGRESS NOTE ADULT - PROBLEM SELECTOR PLAN 4
Assessment:  - patient has hx of bipolar disorder and is currently on multiple antipsychotics: High dose seroquel, abilify, chlorpromazine ,lurasidone, tizanidine  - Qtc 460   - previous psych consult recommended continuation of all antipsychotics    Plan:  - Abilfy 30mg daily, Latuda 40mg at noon  - Depakote  daily and 1000 night   - Seroquel 200mg at 8am and 8pm and at 12PM   - Chlorpromazine 50mg 4x daily   - If Agitated: Chlorpromazine 50mg IM q4h prn with Ativan 2mg IM q4h prn but avoid arms   - Psych following, recs appreciated Pt had episodic dysuria yesterday  - f/u UCx and UA Pt had episodic dysuria yesterday  - UA 6/26 wnl  - f/u UCx

## 2022-06-26 NOTE — CHART NOTE - NSCHARTNOTEFT_GEN_A_CORE
Confidential Drug Utilization Report  Search Terms: Jt Marte, 1985Search Date: 06/26/2022 07:10:36 AM  Searching on behalf of: [id number] - [facility]  The Drug Utilization Report below displays all of the controlled substance prescriptions, if any, that your patient has filled in the last twelve months. The information displayed on this report is compiled from pharmacy submissions to the Department, and accurately reflects the information as submitted by the pharmacies.    This report was requested by: David Mirza | Reference #: [id number]    Others' Prescriptions  Patient Name: Jt MarteBirth Date: 1985  Address: [address] [place] [id number]: Female  Rx Written	Rx Dispensed	Drug	Quantity	Days Supply	Prescriber Name	Prescriber Abeba #	Payment Method	Dispenser  06/07/2022	06/07/2022	lorazepam 0.5 mg tablet	60	30	Carmen Weiner MD	[id number]	[facility]  04/29/2022	04/29/2022	lorazepam 0.5 mg tablet	60	30	Carmen Weiner MD	[id number]	[facility]  04/07/2022	04/07/2022	lorazepam 0.5 mg tablet	14	7	[facility]	[id number]	[facility]  * - Drugs marked with an asterisk are compound drugs. If the compound drug is made up of more than one controlled substance, then each controlled substance will be a separate row in the table.

## 2022-06-26 NOTE — PROGRESS NOTE ADULT - PROBLEM SELECTOR PLAN 3
Assessment:  - patient unknown timeframe of LE swelling b/l; believes that it started in March     Plan:  - will get dopplers of LE to evaluate for DVT in the setting of factor V leiden  - Pro BNP w/i nl Ppatient unknown timeframe of LE swelling b/l; believes that it started in March   - Hx of factor V leiden  - Pro BNP w/i nl  - reports LLE pain w/ edema > RLE  - 6/14 US doppler of b/l LE: no evidence of DVTs  - f/u LLE ultrasound Assessment:  - patient has hx of bipolar disorder and is currently on multiple antipsychotics: High dose seroquel, abilify, chlorpromazine ,lurasidone, tizanidine  - Qtc 460   - previous psych consult recommended continuation of all antipsychotics    Plan:  - Abilfy 30mg daily, Latuda 40mg at noon  - Depakote  daily and 1000 night   - Seroquel 200mg at 8am and 8pm and at 12PM   - Chlorpromazine 50mg 4x daily   - If Agitated: Chlorpromazine 50mg IM q4h prn with Ativan 2mg IM q4h prn but avoid arms   - Psych following, recs appreciated

## 2022-06-26 NOTE — PROGRESS NOTE ADULT - PROBLEM SELECTOR PLAN 2
- As above Patient unknown timeframe of LE swelling b/l; believes that it started in March   - Hx of factor V Leiden  - Pro BNP w/i nl  - reports LLE pain w/ edema > RLE  - 6/14 US doppler of b/l LE: no evidence of DVTs  - f/u LLE ultrasound

## 2022-06-26 NOTE — PROGRESS NOTE ADULT - PROBLEM SELECTOR PLAN 9
- Hx of RLE DVT, on Eliquis 5mg BID outpatient   - started on lovenox - Hx of RLE DVT, on Eliquis 5mg BID outpatient   - c/w lovenox

## 2022-06-26 NOTE — DIETITIAN INITIAL EVALUATION ADULT - PERTINENT MEDS FT
MEDICATIONS  (STANDING):  ARIPiprazole 30 milliGRAM(s) Oral daily  chlorhexidine 2% Cloths 1 Application(s) Topical daily  chlorproMAZINE    Tablet 50 milliGRAM(s) Oral <User Schedule>  clindamycin   Capsule 300 milliGRAM(s) Oral every 6 hours  diVALproex  milliGRAM(s) Oral daily  diVALproex ER 1000 milliGRAM(s) Oral at bedtime  enoxaparin Injectable 120 milliGRAM(s) SubCutaneous every 12 hours  levothyroxine 75 MICROGram(s) Oral daily  lurasidone 40 milliGRAM(s) Oral daily  melatonin 3 milliGRAM(s) Oral at bedtime  multivitamin/minerals/iron Oral Solution (CENTRUM) 15 milliLiter(s) Oral daily  pantoprazole   Suspension 40 milliGRAM(s) Oral daily  polyethylene glycol 3350 Oral Powder - Peds 17 Gram(s) Oral daily  QUEtiapine 200 milliGRAM(s) Oral two times a day  QUEtiapine 50 milliGRAM(s) Oral <User Schedule>    MEDICATIONS  (PRN):  acetaminophen     Tablet .. 650 milliGRAM(s) Oral every 6 hours PRN Temp greater or equal to 38C (100.4F), Mild Pain (1 - 3)  ALBUTerol    90 MICROgram(s) HFA Inhaler 2 Puff(s) Inhalation every 6 hours PRN Shortness of Breath and/or Wheezing  chlorproMAZINE    Injectable 50 milliGRAM(s) IntraMuscular every 4 hours PRN Agitation  diphenhydrAMINE Elixir 25 milliGRAM(s) Oral every 8 hours PRN Itching  oxyCODONE    IR 5 milliGRAM(s) Oral every 4 hours PRN Moderate Pain (4 - 6)  oxyCODONE    IR 10 milliGRAM(s) Oral every 4 hours PRN Severe Pain (7 - 10)

## 2022-06-27 LAB
ANION GAP SERPL CALC-SCNC: 10 MMOL/L — SIGNIFICANT CHANGE UP (ref 7–14)
BUN SERPL-MCNC: 8 MG/DL — SIGNIFICANT CHANGE UP (ref 7–23)
CALCIUM SERPL-MCNC: 9.1 MG/DL — SIGNIFICANT CHANGE UP (ref 8.4–10.5)
CHLORIDE SERPL-SCNC: 106 MMOL/L — SIGNIFICANT CHANGE UP (ref 98–107)
CO2 SERPL-SCNC: 25 MMOL/L — SIGNIFICANT CHANGE UP (ref 22–31)
CREAT SERPL-MCNC: 0.77 MG/DL — SIGNIFICANT CHANGE UP (ref 0.5–1.3)
CULTURE RESULTS: SIGNIFICANT CHANGE UP
EGFR: 103 ML/MIN/1.73M2 — SIGNIFICANT CHANGE UP
GLUCOSE SERPL-MCNC: 117 MG/DL — HIGH (ref 70–99)
HCT VFR BLD CALC: 32.5 % — LOW (ref 34.5–45)
HGB BLD-MCNC: 10.1 G/DL — LOW (ref 11.5–15.5)
MAGNESIUM SERPL-MCNC: 1.9 MG/DL — SIGNIFICANT CHANGE UP (ref 1.6–2.6)
MCHC RBC-ENTMCNC: 29.8 PG — SIGNIFICANT CHANGE UP (ref 27–34)
MCHC RBC-ENTMCNC: 31.1 GM/DL — LOW (ref 32–36)
MCV RBC AUTO: 95.9 FL — SIGNIFICANT CHANGE UP (ref 80–100)
NRBC # BLD: 0 /100 WBCS — SIGNIFICANT CHANGE UP
NRBC # FLD: 0 K/UL — SIGNIFICANT CHANGE UP
PHOSPHATE SERPL-MCNC: 3.5 MG/DL — SIGNIFICANT CHANGE UP (ref 2.5–4.5)
PHOSPHATE SERPL-MCNC: 4.1 MG/DL — SIGNIFICANT CHANGE UP (ref 2.5–4.5)
PLATELET # BLD AUTO: 151 K/UL — SIGNIFICANT CHANGE UP (ref 150–400)
POTASSIUM SERPL-MCNC: 4.5 MMOL/L — SIGNIFICANT CHANGE UP (ref 3.5–5.3)
POTASSIUM SERPL-SCNC: 4.5 MMOL/L — SIGNIFICANT CHANGE UP (ref 3.5–5.3)
RBC # BLD: 3.39 M/UL — LOW (ref 3.8–5.2)
RBC # FLD: 15.2 % — HIGH (ref 10.3–14.5)
SODIUM SERPL-SCNC: 141 MMOL/L — SIGNIFICANT CHANGE UP (ref 135–145)
SPECIMEN SOURCE: SIGNIFICANT CHANGE UP
WBC # BLD: 3.99 K/UL — SIGNIFICANT CHANGE UP (ref 3.8–10.5)
WBC # FLD AUTO: 3.99 K/UL — SIGNIFICANT CHANGE UP (ref 3.8–10.5)

## 2022-06-27 PROCEDURE — 99232 SBSQ HOSP IP/OBS MODERATE 35: CPT

## 2022-06-27 PROCEDURE — 93971 EXTREMITY STUDY: CPT | Mod: 26,LT

## 2022-06-27 RX ORDER — SENNA PLUS 8.6 MG/1
2 TABLET ORAL AT BEDTIME
Refills: 0 | Status: DISCONTINUED | OUTPATIENT
Start: 2022-06-27 | End: 2022-07-02

## 2022-06-27 RX ORDER — VANCOMYCIN HCL 1 G
1250 VIAL (EA) INTRAVENOUS EVERY 12 HOURS
Refills: 0 | Status: DISCONTINUED | OUTPATIENT
Start: 2022-06-27 | End: 2022-06-30

## 2022-06-27 RX ORDER — POLYETHYLENE GLYCOL 3350 17 G/17G
17 POWDER, FOR SOLUTION ORAL EVERY 12 HOURS
Refills: 0 | Status: DISCONTINUED | OUTPATIENT
Start: 2022-06-27 | End: 2022-07-02

## 2022-06-27 RX ADMIN — Medication 75 MICROGRAM(S): at 05:48

## 2022-06-27 RX ADMIN — OXYCODONE HYDROCHLORIDE 10 MILLIGRAM(S): 5 TABLET ORAL at 11:23

## 2022-06-27 RX ADMIN — Medication 300 MILLIGRAM(S): at 05:50

## 2022-06-27 RX ADMIN — Medication 300 MILLIGRAM(S): at 11:16

## 2022-06-27 RX ADMIN — Medication 50 MILLIGRAM(S): at 17:46

## 2022-06-27 RX ADMIN — Medication 3 MILLIGRAM(S): at 21:03

## 2022-06-27 RX ADMIN — QUETIAPINE FUMARATE 200 MILLIGRAM(S): 200 TABLET, FILM COATED ORAL at 18:38

## 2022-06-27 RX ADMIN — OXYCODONE HYDROCHLORIDE 10 MILLIGRAM(S): 5 TABLET ORAL at 23:25

## 2022-06-27 RX ADMIN — CHLORHEXIDINE GLUCONATE 1 APPLICATION(S): 213 SOLUTION TOPICAL at 11:22

## 2022-06-27 RX ADMIN — OXYCODONE HYDROCHLORIDE 10 MILLIGRAM(S): 5 TABLET ORAL at 10:23

## 2022-06-27 RX ADMIN — DIVALPROEX SODIUM 1000 MILLIGRAM(S): 500 TABLET, DELAYED RELEASE ORAL at 21:03

## 2022-06-27 RX ADMIN — LURASIDONE HYDROCHLORIDE 40 MILLIGRAM(S): 40 TABLET ORAL at 12:11

## 2022-06-27 RX ADMIN — Medication 50 MILLIGRAM(S): at 11:17

## 2022-06-27 RX ADMIN — OXYCODONE HYDROCHLORIDE 10 MILLIGRAM(S): 5 TABLET ORAL at 23:55

## 2022-06-27 RX ADMIN — ENOXAPARIN SODIUM 120 MILLIGRAM(S): 100 INJECTION SUBCUTANEOUS at 18:36

## 2022-06-27 RX ADMIN — ARIPIPRAZOLE 30 MILLIGRAM(S): 15 TABLET ORAL at 11:16

## 2022-06-27 RX ADMIN — QUETIAPINE FUMARATE 200 MILLIGRAM(S): 200 TABLET, FILM COATED ORAL at 05:50

## 2022-06-27 RX ADMIN — ENOXAPARIN SODIUM 120 MILLIGRAM(S): 100 INJECTION SUBCUTANEOUS at 05:51

## 2022-06-27 RX ADMIN — Medication 50 MILLIGRAM(S): at 06:38

## 2022-06-27 RX ADMIN — PANTOPRAZOLE SODIUM 40 MILLIGRAM(S): 20 TABLET, DELAYED RELEASE ORAL at 11:18

## 2022-06-27 RX ADMIN — OXYCODONE HYDROCHLORIDE 10 MILLIGRAM(S): 5 TABLET ORAL at 06:21

## 2022-06-27 RX ADMIN — OXYCODONE HYDROCHLORIDE 10 MILLIGRAM(S): 5 TABLET ORAL at 16:42

## 2022-06-27 RX ADMIN — DIVALPROEX SODIUM 500 MILLIGRAM(S): 500 TABLET, DELAYED RELEASE ORAL at 11:17

## 2022-06-27 RX ADMIN — Medication 50 MILLIGRAM(S): at 23:25

## 2022-06-27 RX ADMIN — SENNA PLUS 2 TABLET(S): 8.6 TABLET ORAL at 21:03

## 2022-06-27 RX ADMIN — Medication 50 MILLIGRAM(S): at 06:22

## 2022-06-27 RX ADMIN — OXYCODONE HYDROCHLORIDE 10 MILLIGRAM(S): 5 TABLET ORAL at 17:42

## 2022-06-27 RX ADMIN — Medication 50 MILLIGRAM(S): at 21:03

## 2022-06-27 RX ADMIN — QUETIAPINE FUMARATE 50 MILLIGRAM(S): 200 TABLET, FILM COATED ORAL at 11:20

## 2022-06-27 RX ADMIN — Medication 15 MILLILITER(S): at 12:11

## 2022-06-27 RX ADMIN — Medication 250 MILLIGRAM(S): at 17:05

## 2022-06-27 NOTE — PROGRESS NOTE ADULT - PROBLEM SELECTOR PLAN 8
RESOLVED  Plt downtrending since admission; plt now stable 2/2 lab error   - differential includes sepsis vs hep products (low clinical suspicion) vs Depakote induced (low suspicion)   - c/w lovenox - Hx of RLE DVT, on Eliquis 5mg BID outpatient   - c/w lovenox

## 2022-06-27 NOTE — CHART NOTE - NSCHARTNOTEFT_GEN_A_CORE
Obtained permission from patient Ms. Jt Mrate to talk to  and all other members involved in legal guardianship to discuss how to proceed with getting consent for possible Interventional Radiology drainage of left deltoid abscess. Called case antony Medina at [phone] and was referred to  of Case Management Maria Elena Armijo. Called Maria Elena Armijo at [phone]. Discussed case with Maria Elena and jose l Vincent on a joint call. Informed that along with Maria Elena and Cliff, another member May Raza ([phone]), make up the legal guardianship  Obtained permission from patient Ms. Jt Marte to talk to  and all other members involved in legal guardianship to discuss how to proceed with getting consent for possible Interventional Radiology drainage of left deltoid abscess. Called case antony Maddoxline at [phone] and was referred to  of Case Management Maria Elena Armijo. Called Maria Elena Armijo at [phone]. Discussed case with Maria Elena and  Cliff Vincent on a joint call. Informed that along with Maria Elena and Cliff, another member May Raza ([phone]), make up the legal guardianship . Answered questions regarding necessity for abscess drainage and current medical management. All questions answered. Legal guardians will call patient to discuss with patient. From my discussions with patient, she would prefer to have abscess drained due to pain that it is causing.     We will inform Interventional Radiology of this discussion so they can call legal guardians at [phone] to get official consent for procedure.    David Mirza  PGY2

## 2022-06-27 NOTE — PROGRESS NOTE ADULT - ASSESSMENT
35 y/o F with pmhx of bipolar disorder, hypothyroidism, factor V Leiden on eliquis, seizures, presented to the ED for new onset L shoulder pain. The patient found to have a L deltoid abscess likely secondary to IM injections. Admission for I/D and observation.

## 2022-06-27 NOTE — PROGRESS NOTE ADULT - PROBLEM SELECTOR PLAN 3
Assessment:  - patient has hx of bipolar disorder and is currently on multiple antipsychotics: High dose seroquel, abilify, chlorpromazine ,lurasidone, tizanidine  - Qtc 460   - previous psych consult recommended continuation of all antipsychotics    Plan:  - Abilfy 30mg daily, Latuda 40mg at noon  - Depakote  daily and 1000 night   - Seroquel 200mg at 8am and 8pm and at 12PM   - Chlorpromazine 50mg 4x daily   - If Agitated: Chlorpromazine 50mg IM q4h prn with Ativan 2mg IM q4h prn but avoid arms   - Psych following, recs appreciated Patient has hx of bipolar disorder and is currently on multiple antipsychotics: High dose seroquel, abilify, chlorpromazine ,lurasidone, tizanidine  - Qtc 460   - Abilfy 30mg daily, Latuda 40mg at noon  - Depakote  daily and 1000 night   - Seroquel 200mg at 8am and 8pm and at 12PM   - Chlorpromazine 50mg 4x daily   - If Agitated: Chlorpromazine 50mg IM q4h prn with Ativan 2mg IM q4h prn but avoid arms   - Psych following, recs appreciated

## 2022-06-27 NOTE — CONSULT NOTE ADULT - ASSESSMENT
Vascular & Interventional Radiology Brief Consult Note     Evaluate for Procedure:            HPI: 38 y/o Female with pmhx of bipolar disorder, hypothyroidism, factor V Leiden on eliquis, seizures, presented to the ED for new onset L shoulder pain. The patient was previously admitted at [facility] for R shoulder pain after an IM injection. Work up showed R sided abscess. She had I & D and IR drainage of the abscess collection at OSH. At OSH approximately 2 weeks ago, she also had received an IM injection on the L arm during that admission for anxiety. She was then discharged with doxycycline. The patient was admitted with new onset L sided shoulder pain and swelling and was found to have deltoid abscess on 6/17 imaging. Patient was to have IR drainage, however unable to obtain consent. Team reports patient now has guardian for consent and IR was reconsulted for drainage.      Allergies: latex (Blisters)     penicillin (Hives)   penicillin (Other)   pineapple (Unknown)   Toradol (Rash)   Toradol (Unknown)   Zofran (Hives)   Zofran (Unknown)     Medications (Abx/Cardiac/Anticoagulation/Blood Products     clindamycin   Capsule: 300 milliGRAM(s) Oral (06-27 @ 11:16)     enoxaparin Injectable: 120 milliGRAM(s) SubCutaneous (06-27 @ 05:51)     vancomycin  IVPB: 250 mL/Hr IV Intermittent (06-27 @ 17:05)           Data:           T(C): 37.1   HR: 95   BP: 104/66   RR: 18   SpO2: 100%       -WBC 3.99 / HgB 10.1 / Hct 32.5 / Plt 151   -Na 141 / Cl 106 / BUN 8 / Glucose 117   -K 4.5 / CO2 25 / Cr 0.77   -ALT -- / Alk Phos -- / T.Bili --   -INR 1.07 / PTT 28.9     Imaging: CT L upper extremity from 06/17 w/ deltoid collection      Assessment/Plan:      -37y Female with concern for left deltoid abscess as seen on 06/17/2022 imaging.  Given time out from prior imaging, recommend repeating CT upper extremity with contrast. IR will continue to follow  pending results of CT.  
36F hx of BPD and factor V leiden on eliquis here with intramuscular abscess after IM injection    surgery consulted to r/o NSTI  no clinical signs of necrotizing infection  LRINEC 2    Recommendations:  -agree with abx and IR consultation for intramuscular abscess drainage  -monitor for worsening sx    Discussed with Dr. Hal PETERSON TEAM SURGERY  [id number]
Patient is a 35 yo F, from [facility], hx of Factor V Leiden on Eliquis, Bipolar disease, recent Right shoulder abscess after an IM injection s/p I and D and aspiration procedure at [facility] with negative cultures from 6/10 to 6/16, discharged with PO doxy, returning back for left shoulder pain associated with chills s/p IM injection, CT showing Left upper extremity abscess associated with deltoid musculature on IV Clinda.

## 2022-06-27 NOTE — CONSULT NOTE ADULT - CONSULT REASON
Patient found to have L deltoid abscess on CT scan s/p IM injection. Consultation for abscess drainage.
r/o NSTI
Left upper shoulder collection
L deltoid collection drain

## 2022-06-27 NOTE — PROGRESS NOTE ADULT - PROBLEM SELECTOR PLAN 1
Patient presented with abscess of the L shoulder secondary to IM injection. No leukocytosis however was tachycardic and mildly hypotensive.  - previously had R shoulder abscess in the past  - CT L shoulder shows LUE abscess closely associated with the deltoid musculature. A couple foci of subcutaneous gas along the inferior margin of the collection are most likely related to recent upper extremity injection.   - surgery consulted, no indication for intervention, negative for nec fasc   - Ultrasound demonstrating R arm abscess   - (6/24) - Decision was made by IR team to hold off on I/D procedure  - ID consulted, rec switching clindamycin to vanc  - 6/25 - Pt now w infiltrated IV, swelling of RUE and hand w/ suspected thrombophlebitis.  - Switched from IV vanco to oral clindamycin 2/2 infiltrated IV  - c/w oral clindamycin, plan for midline for IV vanco  - c/w oxycodone PRN for pain Patient presented with abscess of the L shoulder secondary to IM injection. No leukocytosis however was tachycardic and mildly hypotensive.  - previously had R shoulder abscess in the past  - CT L shoulder shows LUE abscess closely associated with the deltoid musculature. A couple foci of subcutaneous gas along the inferior margin of the collection are most likely related to recent upper extremity injection.   - surgery consulted, no indication for intervention, negative for nec fasc   - Ultrasound demonstrating R arm abscess   - (6/24) - Decision was made by IR team to hold off on I/D procedure  - ID consulted, rec switching clindamycin to vanc  - 6/25 - Pt now w infiltrated IV, swelling of RUE and hand w/ suspected thrombophlebitis.  - 6/25 Switched from IV vanco to oral clindamycin 2/2 infiltrated IV  - 6/27 IV access obtained, switched back to IV vanco 1.25g BID  - c/w oxycodone PRN for pain

## 2022-06-27 NOTE — PROGRESS NOTE ADULT - SUBJECTIVE AND OBJECTIVE BOX
Follow Up:  left deltoid collection    Interval History/ROS: feels left arm swelling has increased     Allergies  latex (Blisters)  penicillin (Hives)  penicillin (Other)  pineapple (Unknown)  Toradol (Rash)  Toradol (Unknown)  Zofran (Hives)  Zofran (Unknown)        ANTIMICROBIALS:  vancomycin  IVPB 1250 every 12 hours      OTHER MEDS:  acetaminophen     Tablet .. 650 milliGRAM(s) Oral every 6 hours PRN  ALBUTerol    90 MICROgram(s) HFA Inhaler 2 Puff(s) Inhalation every 6 hours PRN  ARIPiprazole 30 milliGRAM(s) Oral daily  chlorhexidine 2% Cloths 1 Application(s) Topical daily  chlorproMAZINE    Injectable 50 milliGRAM(s) IntraMuscular every 4 hours PRN  chlorproMAZINE    Tablet 50 milliGRAM(s) Oral <User Schedule>  diphenhydrAMINE 50 milliGRAM(s) Oral every 8 hours PRN  diphenhydrAMINE Elixir 25 milliGRAM(s) Oral every 8 hours PRN  diVALproex  milliGRAM(s) Oral daily  diVALproex ER 1000 milliGRAM(s) Oral at bedtime  enoxaparin Injectable 120 milliGRAM(s) SubCutaneous every 12 hours  levothyroxine 75 MICROGram(s) Oral daily  lurasidone 40 milliGRAM(s) Oral daily  melatonin 3 milliGRAM(s) Oral at bedtime  multivitamin/minerals/iron Oral Solution (CENTRUM) 15 milliLiter(s) Oral daily  oxyCODONE    IR 5 milliGRAM(s) Oral every 4 hours PRN  oxyCODONE    IR 10 milliGRAM(s) Oral every 4 hours PRN  pantoprazole   Suspension 40 milliGRAM(s) Oral daily  polyethylene glycol 3350 17 Gram(s) Oral every 12 hours  QUEtiapine 200 milliGRAM(s) Oral two times a day  QUEtiapine 50 milliGRAM(s) Oral <User Schedule>  senna 2 Tablet(s) Oral at bedtime      Vital Signs Last 24 Hrs  T(C): 37.1 (2022 12:28), Max: 37.1 (2022 12:28)  T(F): 98.8 (2022 12:28), Max: 98.8 (2022 12:28)  HR: 95 (2022 12:28) (88 - 95)  BP: 104/66 (2022 12:28) (104/66 - 116/70)  BP(mean): --  RR: 18 (2022 12:28) (17 - 18)  SpO2: 100% (2022 12:28) (100% - 100%)    PHYSICAL EXAM:  Constitutional: Not in acute distress  Eyes: No icterus.  Oral cavity: Clear, no lesions  Neck: Supple  RS: Chest clear to auscultation bilaterally. No wheeze/rhonchi/crepitations.  CVS: S1, S2 heard. Regular rate and rhythm. No murmurs/rubs/gallops.  Abdomen: Soft. No guarding/rigidity/tenderness.  : no markham  Extremities: left lateral upper arm with swelling no erythema  Neuro: Alert, oriented to time/place/person  Cranial nerves 2-12 grossly normal. No focal abnormalities                          10.1   3.99  )-----------( 151      ( 2022 10:43 )             32.5           141  |  106  |  8   ----------------------------<  117<H>  4.5   |  25  |  0.77    Ca    9.1      2022 10:43  Phos  3.5       Mg     1.90             Urinalysis Basic - ( 2022 14:06 )    Color: Yellow / Appearance: Clear / S.027 / pH: x  Gluc: x / Ketone: Negative  / Bili: Negative / Urobili: 3 mg/dL   Blood: x / Protein: Trace / Nitrite: Negative   Leuk Esterase: Negative / RBC: 1 /HPF / WBC 1 /HPF   Sq Epi: x / Non Sq Epi: 1 /HPF / Bacteria: x        MICROBIOLOGY:  v  .Blood Blood-Venous  22   No Growth Final  --  --      .Blood Blood-Peripheral  22   No Growth Final  --  --      .Body Fluid Interstitial Fluid  22   No growth at 1 week.  --  --      .Body Fluid righ arm fluid collection  22   No growth  --    No polymorphonuclear cells seen per low power field  No organisms seen per oil power field      .Abscess Arm - Right  06-10-22   No growth at 5 days  --  --      .Blood Blood-Peripheral  06-10-22   No Growth Final  --  --      Clean Catch Clean Catch (Midstream)  22   >=3 organisms. Probable collection contamination.  --  --        RADIOLOGY:    r< from: US Duplex Venous Lower Ext Ltd, Left (22 @ 09:43) >  CC: [id number] EXAM:  US DPLX LWR EXT VEINS LTD LT                          PROCEDURE DATE:  2022          INTERPRETATION:  CLINICAL INFORMATION: Left lower extremity pain and   edema. Factor V Leiden.    COMPARISON: None available.    TECHNIQUE: Duplex sonography of the LEFT LOWER extremity veins with color   and spectral Doppler, with and without compression.    FINDINGS:    There is normal compressibility of the left common femoral, femoral and   popliteal veins.  The contralateral common femoral vein is patent.  Doppler examination shows normal spontaneous and phasic flow.    No calf vein thrombosis is detected.    IMPRESSION:  No evidence of left lower extremity deep venous thrombosis.          --- End of Report ---      < end of copied text >  < from: US Extremity Nonvasc Limited, Left (22 @ 10:03) >  ACC: [id number] EXAM:  US NONVASC EXT LTD LT                          PROCEDURE DATE:  2022          INTERPRETATION:  CLINICAL INFORMATION:  Left upper arm redness and   swelling. Concern for abscess.    TECHNIQUE: US NONVASC EXTREMITY LIMITED LEFT.    COMPARISON: None available.    FINDINGS:  Targeted ultrasound in the left upper arm underlying the indicated region   of concern demonstrates a complex fluid collection without evidence of   surrounding increased vascularity measuring 3.1 x 1.6 x 4.0 cm.    IMPRESSION:    Complex fluid collection in the left upper arm measuring 3.1 x 1.6 x 4.0   cm.    --- End of Report ---      < end of copied text >

## 2022-06-27 NOTE — PROGRESS NOTE ADULT - PROBLEM SELECTOR PLAN 9
- Hx of RLE DVT, on Eliquis 5mg BID outpatient   - c/w lovenox Diet: regular  DVT: lovenox subQ BID    - episode of blood coated stool this am, c/f anal fissure

## 2022-06-27 NOTE — PROGRESS NOTE ADULT - PROBLEM SELECTOR PLAN 6
Oma c/w depakote Patient has Hx of recurrent seizure like activity; Has been evaluated by neurology in the past with negative EEG: Activity consistent with pseudoseizure   - S/p RRT for jerking movement in upper extremities (6/21); resolved without medications being given   - Low concern for seizure given presentation and prior history, continue to monitor at this time  - c/w depakote

## 2022-06-27 NOTE — PROGRESS NOTE ADULT - PROBLEM SELECTOR PLAN 7
Patient has Hx of recurrent seizure like activity; Has been evaluated by neurology in the past with negative EEG: Activity consistent with pseudoseizure   - S/p RRT for jerking movement in upper extremities (6/21); resolved without medications being given   - Low concern for seizure given presentation and prior history, continue to monitor at this time RESOLVED  Plt downtrending since admission; plt now stable 2/2 lab error   - differential includes sepsis vs hep products (low clinical suspicion) vs Depakote induced (low suspicion)   - c/w lovenox

## 2022-06-27 NOTE — PROGRESS NOTE ADULT - ASSESSMENT
35 yo woman with bipolar ds, Factor V Leiden presenting with chills and decreased movement left arm found to have abscess left deltoid muscle suspected due to IM injection.  Unknown organism.    would consult IR for diagnostic aspirate with culture routine, fungal    Would change clinda to vancomycin 1.25 gm iv q 12 h  check trough prior to 3rd dose.

## 2022-06-27 NOTE — PROGRESS NOTE ADULT - PROBLEM SELECTOR PLAN 2
Patient unknown timeframe of LE swelling b/l; believes that it started in March   - Hx of factor V Leiden  - Pro BNP w/i nl  - reports LLE pain w/ edema > RLE  - 6/14 US doppler of b/l LE: no evidence of DVTs  - f/u LLE ultrasound Patient unknown timeframe of LE swelling b/l; believes that it started in March   - Hx of factor V Leiden  - Pro BNP w/i nl  - reports LLE pain w/ edema > RLE  - 6/14 US doppler of b/l LE: no evidence of DVTs  - 6/27 LLE duplex ultrasound - no evidence of DVT  - continue to monitor    #New rip hip induration 6/27  - reports at site of prior injection  - receiving IV vanco at this time  - continue to monitor

## 2022-06-27 NOTE — PROGRESS NOTE ADULT - SUBJECTIVE AND OBJECTIVE BOX
***************************************************************  Fredrick Doyle, PGY1  Internal Medicine   [facility] Pager [phone]  [facility] Pager [phone]  ***************************************************************    YODIT MORROW  37y  MRN: [id number]  22 (9d)    Patient is a 37y old  Female who presents with a chief complaint of Cutaneous abscess of left upper extremity     (2022 14:56)      SUBJECTIVE / OVERNIGHT EVENTS: No acute overnight events. Pt seen and examined at bedside. Denies fevers, chills, CP, SOB, Abdominal pain, N/V, Constipation, Diarrhea. Last BM     12 Point ROS negative with the exception of the above    MEDICATIONS  (STANDING):  ARIPiprazole 30 milliGRAM(s) Oral daily  chlorhexidine 2% Cloths 1 Application(s) Topical daily  chlorproMAZINE    Tablet 50 milliGRAM(s) Oral <User Schedule>  clindamycin   Capsule 300 milliGRAM(s) Oral every 6 hours  diVALproex  milliGRAM(s) Oral daily  diVALproex ER 1000 milliGRAM(s) Oral at bedtime  enoxaparin Injectable 120 milliGRAM(s) SubCutaneous every 12 hours  levothyroxine 75 MICROGram(s) Oral daily  lurasidone 40 milliGRAM(s) Oral daily  melatonin 3 milliGRAM(s) Oral at bedtime  multivitamin/minerals/iron Oral Solution (CENTRUM) 15 milliLiter(s) Oral daily  pantoprazole   Suspension 40 milliGRAM(s) Oral daily  polyethylene glycol 3350 Oral Powder - Peds 17 Gram(s) Oral daily  QUEtiapine 200 milliGRAM(s) Oral two times a day  QUEtiapine 50 milliGRAM(s) Oral <User Schedule>    MEDICATIONS  (PRN):  acetaminophen     Tablet .. 650 milliGRAM(s) Oral every 6 hours PRN Temp greater or equal to 38C (100.4F), Mild Pain (1 - 3)  ALBUTerol    90 MICROgram(s) HFA Inhaler 2 Puff(s) Inhalation every 6 hours PRN Shortness of Breath and/or Wheezing  chlorproMAZINE    Injectable 50 milliGRAM(s) IntraMuscular every 4 hours PRN Agitation  diphenhydrAMINE 50 milliGRAM(s) Oral every 8 hours PRN Rash and/or Itching  diphenhydrAMINE Elixir 25 milliGRAM(s) Oral every 8 hours PRN Itching  oxyCODONE    IR 5 milliGRAM(s) Oral every 4 hours PRN Moderate Pain (4 - 6)  oxyCODONE    IR 10 milliGRAM(s) Oral every 4 hours PRN Severe Pain (7 - 10)      OBJECTIVE:  Vital Signs Last 24 Hrs  T(C): 36.6 (2022 22:10), Max: 36.9 (2022 14:13)  T(F): 97.9 (2022 22:10), Max: 98.5 (2022 14:13)  HR: 88 (2022 22:10) (88 - 92)  BP: 116/70 (2022 22:10) (102/65 - 116/70)  BP(mean): --  RR: 17 (2022 22:10) (17 - 17)  SpO2: 100% (2022 22:10) (99% - 100%)    I&O's Summary      PHYSICAL EXAM:  GENERAL: Laying comfortably, NAD  HEENT: NCAT, PERRLA, EOMI, no scleral icterus, no LAD  NECK: No JVD  LUNG: CTABL; No wheezes, crackles, or rhonchi  HEART: RRR; normal S1/S2; No murmurs, rubs, or gallops  ABDOMEN: +BS, soft, nontender, nondistended, no HSM; No rebound, guarding, or rigidity  EXTREMITIES:  No LE edema b/l, 2+ Peripheral Pulses, No clubbing, cyanosis, or edema  NEUROLOGY: AOx3, non-focal, strength 5/5 in all extremities, sensation intact  PSYCH: calm and cooperative  SKIN: No rashes or lesions    LABS:                Urinalysis Basic - ( 2022 14:06 )    Color: Yellow / Appearance: Clear / S.027 / pH: x  Gluc: x / Ketone: Negative  / Bili: Negative / Urobili: 3 mg/dL   Blood: x / Protein: Trace / Nitrite: Negative   Leuk Esterase: Negative / RBC: 1 /HPF / WBC 1 /HPF   Sq Epi: x / Non Sq Epi: 1 /HPF / Bacteria: x          CAPILLARY BLOOD GLUCOSE            RADIOLOGY & ADDITIONAL TESTS:      Imaging Personally Reviewed: [ ] YES  [ ] NO    Consultants involved in case:   Consultant(s) Notes Reviewed:  [ ] YES  [ ] NO:   Care Discussed with Consultants/Other Providers [ ] YES  [ ] NO ***************************************************************  Fredrick Doyle, PGY1  Internal Medicine   [facility] Pager [phone]  [facility] Pager [phone]  ***************************************************************    YODIT MORROW  37y  MRN: [id number]  22 (9d)    Patient is a 37y old  Female who presents with a chief complaint of Cutaneous abscess of left upper extremity     (2022 14:56)      SUBJECTIVE / OVERNIGHT EVENTS: No acute overnight events. Pt seen and examined at bedside. Denies fevers, chills, CP, SOB, HA, abdominal pain, vomiting, and constipation. Reports blood coated stool this am without shireen blood in toilet and nausea this mid-afternoon. Additionally reports LUE pain > RUE pain today with pain radiating from the L shoulder through her forearm. Also complains of R hip pain with an area of induration noted, states, this is due to a prior injection.     12 Point ROS negative with the exception of the above    MEDICATIONS  (STANDING):  ARIPiprazole 30 milliGRAM(s) Oral daily  chlorhexidine 2% Cloths 1 Application(s) Topical daily  chlorproMAZINE    Tablet 50 milliGRAM(s) Oral <User Schedule>  clindamycin   Capsule 300 milliGRAM(s) Oral every 6 hours  diVALproex  milliGRAM(s) Oral daily  diVALproex ER 1000 milliGRAM(s) Oral at bedtime  enoxaparin Injectable 120 milliGRAM(s) SubCutaneous every 12 hours  levothyroxine 75 MICROGram(s) Oral daily  lurasidone 40 milliGRAM(s) Oral daily  melatonin 3 milliGRAM(s) Oral at bedtime  multivitamin/minerals/iron Oral Solution (CENTRUM) 15 milliLiter(s) Oral daily  pantoprazole   Suspension 40 milliGRAM(s) Oral daily  polyethylene glycol 3350 Oral Powder - Peds 17 Gram(s) Oral daily  QUEtiapine 200 milliGRAM(s) Oral two times a day  QUEtiapine 50 milliGRAM(s) Oral <User Schedule>    MEDICATIONS  (PRN):  acetaminophen     Tablet .. 650 milliGRAM(s) Oral every 6 hours PRN Temp greater or equal to 38C (100.4F), Mild Pain (1 - 3)  ALBUTerol    90 MICROgram(s) HFA Inhaler 2 Puff(s) Inhalation every 6 hours PRN Shortness of Breath and/or Wheezing  chlorproMAZINE    Injectable 50 milliGRAM(s) IntraMuscular every 4 hours PRN Agitation  diphenhydrAMINE 50 milliGRAM(s) Oral every 8 hours PRN Rash and/or Itching  diphenhydrAMINE Elixir 25 milliGRAM(s) Oral every 8 hours PRN Itching  oxyCODONE    IR 5 milliGRAM(s) Oral every 4 hours PRN Moderate Pain (4 - 6)  oxyCODONE    IR 10 milliGRAM(s) Oral every 4 hours PRN Severe Pain (7 - 10)      OBJECTIVE:  Vital Signs Last 24 Hrs  T(C): 36.6 (2022 22:10), Max: 36.9 (2022 14:13)  T(F): 97.9 (2022 22:10), Max: 98.5 (2022 14:13)  HR: 88 (2022 22:10) (88 - 92)  BP: 116/70 (2022 22:10) (102/65 - 116/70)  BP(mean): --  RR: 17 (2022 22:10) (17 - 17)  SpO2: 100% (2022 22:10) (99% - 100%)    PHYSICAL EXAM:  GENERAL: Laying comfortably, NAD  HEENT: NCAT, PERRLA, EOMI, no scleral icterus, no LAD  LUNG: CTABL; No wheezes, crackles, or rhonchi  HEART: RRR; normal S1/S2; No murmurs, rubs, or gallops  ABDOMEN: +BS, soft, nondistended, mild lower mid-abdominal tenderness  EXTREMITIES:  2+ peripheral pulses throughout; RUE: edema from forearm to hand, with pain to palpation, warm, pulses intact, movement and sensation intact, strength for flexion/extension, R hand finger squeeze diminished vs L. LUE: warmth and edema from shoulder through arm. LLE edema > RLE. R hip area of induration.   NEUROLOGY: AOx3, non-focal, strength 5/5 in all extremities, sensation intact  PSYCH: calm and cooperative  SKIN: No rashes or lesions      LABS:    Urinalysis Basic - ( 2022 14:06 )    Color: Yellow / Appearance: Clear / S.027 / pH: x  Gluc: x / Ketone: Negative  / Bili: Negative / Urobili: 3 mg/dL   Blood: x / Protein: Trace / Nitrite: Negative   Leuk Esterase: Negative / RBC: 1 /HPF / WBC 1 /HPF   Sq Epi: x / Non Sq Epi: 1 /HPF / Bacteria: x      RADIOLOGY & ADDITIONAL TESTS:  US Duplex Venous Lower Ext Ltd, Left (22 @ 09:43)  FINDINGS:  There is normal compressibility of the left common femoral, femoral and   popliteal veins.  The contralateral common femoral vein is patent.  Doppler examination shows normal spontaneous and phasic flow.  No calf vein thrombosis is detected.  IMPRESSION:  No evidence of left lower extremity deep venous thrombosis.      Imaging Personally Reviewed: [ ] YES  [ ] NO    Consultants involved in case:   Consultant(s) Notes Reviewed:  [ ] YES  [ ] NO:   Care Discussed with Consultants/Other Providers [ ] YES  [ ] NO

## 2022-06-28 LAB
ALBUMIN SERPL ELPH-MCNC: 4.1 G/DL — SIGNIFICANT CHANGE UP (ref 3.3–5)
ALP SERPL-CCNC: 50 U/L — SIGNIFICANT CHANGE UP (ref 40–120)
ALT FLD-CCNC: 20 U/L — SIGNIFICANT CHANGE UP (ref 4–33)
ANION GAP SERPL CALC-SCNC: 13 MMOL/L — SIGNIFICANT CHANGE UP (ref 7–14)
AST SERPL-CCNC: 20 U/L — SIGNIFICANT CHANGE UP (ref 4–32)
BILIRUB SERPL-MCNC: 0.2 MG/DL — SIGNIFICANT CHANGE UP (ref 0.2–1.2)
BUN SERPL-MCNC: 8 MG/DL — SIGNIFICANT CHANGE UP (ref 7–23)
CALCIUM SERPL-MCNC: 9.3 MG/DL — SIGNIFICANT CHANGE UP (ref 8.4–10.5)
CHLORIDE SERPL-SCNC: 104 MMOL/L — SIGNIFICANT CHANGE UP (ref 98–107)
CO2 SERPL-SCNC: 23 MMOL/L — SIGNIFICANT CHANGE UP (ref 22–31)
CREAT SERPL-MCNC: 0.82 MG/DL — SIGNIFICANT CHANGE UP (ref 0.5–1.3)
EGFR: 96 ML/MIN/1.73M2 — SIGNIFICANT CHANGE UP
GLUCOSE SERPL-MCNC: 96 MG/DL — SIGNIFICANT CHANGE UP (ref 70–99)
HCT VFR BLD CALC: 39.2 % — SIGNIFICANT CHANGE UP (ref 34.5–45)
HGB BLD-MCNC: 12.2 G/DL — SIGNIFICANT CHANGE UP (ref 11.5–15.5)
MAGNESIUM SERPL-MCNC: 2 MG/DL — SIGNIFICANT CHANGE UP (ref 1.6–2.6)
MCHC RBC-ENTMCNC: 29.5 PG — SIGNIFICANT CHANGE UP (ref 27–34)
MCHC RBC-ENTMCNC: 31.1 GM/DL — LOW (ref 32–36)
MCV RBC AUTO: 94.7 FL — SIGNIFICANT CHANGE UP (ref 80–100)
NRBC # BLD: 0 /100 WBCS — SIGNIFICANT CHANGE UP
NRBC # FLD: 0 K/UL — SIGNIFICANT CHANGE UP
PHOSPHATE SERPL-MCNC: 4.4 MG/DL — SIGNIFICANT CHANGE UP (ref 2.5–4.5)
PLATELET # BLD AUTO: 156 K/UL — SIGNIFICANT CHANGE UP (ref 150–400)
POTASSIUM SERPL-MCNC: 4.5 MMOL/L — SIGNIFICANT CHANGE UP (ref 3.5–5.3)
POTASSIUM SERPL-SCNC: 4.5 MMOL/L — SIGNIFICANT CHANGE UP (ref 3.5–5.3)
PROT SERPL-MCNC: 7.3 G/DL — SIGNIFICANT CHANGE UP (ref 6–8.3)
RBC # BLD: 4.14 M/UL — SIGNIFICANT CHANGE UP (ref 3.8–5.2)
RBC # FLD: 15.5 % — HIGH (ref 10.3–14.5)
SODIUM SERPL-SCNC: 140 MMOL/L — SIGNIFICANT CHANGE UP (ref 135–145)
VANCOMYCIN TROUGH SERPL-MCNC: 8.4 UG/ML — LOW (ref 10–20)
WBC # BLD: 3.94 K/UL — SIGNIFICANT CHANGE UP (ref 3.8–10.5)
WBC # FLD AUTO: 3.94 K/UL — SIGNIFICANT CHANGE UP (ref 3.8–10.5)

## 2022-06-28 PROCEDURE — 99232 SBSQ HOSP IP/OBS MODERATE 35: CPT

## 2022-06-28 RX ORDER — CHLORPROMAZINE HCL 10 MG
50 TABLET ORAL EVERY 4 HOURS
Refills: 0 | Status: DISCONTINUED | OUTPATIENT
Start: 2022-06-28 | End: 2022-07-02

## 2022-06-28 RX ADMIN — ENOXAPARIN SODIUM 120 MILLIGRAM(S): 100 INJECTION SUBCUTANEOUS at 17:29

## 2022-06-28 RX ADMIN — Medication 50 MILLIGRAM(S): at 10:32

## 2022-06-28 RX ADMIN — Medication 50 MILLIGRAM(S): at 06:10

## 2022-06-28 RX ADMIN — DIVALPROEX SODIUM 500 MILLIGRAM(S): 500 TABLET, DELAYED RELEASE ORAL at 11:59

## 2022-06-28 RX ADMIN — Medication 15 MILLILITER(S): at 11:58

## 2022-06-28 RX ADMIN — Medication 50 MILLIGRAM(S): at 10:33

## 2022-06-28 RX ADMIN — Medication 250 MILLIGRAM(S): at 18:25

## 2022-06-28 RX ADMIN — OXYCODONE HYDROCHLORIDE 10 MILLIGRAM(S): 5 TABLET ORAL at 09:46

## 2022-06-28 RX ADMIN — OXYCODONE HYDROCHLORIDE 10 MILLIGRAM(S): 5 TABLET ORAL at 17:28

## 2022-06-28 RX ADMIN — QUETIAPINE FUMARATE 200 MILLIGRAM(S): 200 TABLET, FILM COATED ORAL at 06:01

## 2022-06-28 RX ADMIN — PANTOPRAZOLE SODIUM 40 MILLIGRAM(S): 20 TABLET, DELAYED RELEASE ORAL at 11:59

## 2022-06-28 RX ADMIN — OXYCODONE HYDROCHLORIDE 10 MILLIGRAM(S): 5 TABLET ORAL at 08:46

## 2022-06-28 RX ADMIN — ARIPIPRAZOLE 30 MILLIGRAM(S): 15 TABLET ORAL at 11:59

## 2022-06-28 RX ADMIN — POLYETHYLENE GLYCOL 3350 17 GRAM(S): 17 POWDER, FOR SOLUTION ORAL at 17:29

## 2022-06-28 RX ADMIN — LURASIDONE HYDROCHLORIDE 40 MILLIGRAM(S): 40 TABLET ORAL at 11:59

## 2022-06-28 RX ADMIN — CHLORHEXIDINE GLUCONATE 1 APPLICATION(S): 213 SOLUTION TOPICAL at 12:00

## 2022-06-28 RX ADMIN — OXYCODONE HYDROCHLORIDE 10 MILLIGRAM(S): 5 TABLET ORAL at 14:18

## 2022-06-28 RX ADMIN — Medication 250 MILLIGRAM(S): at 05:59

## 2022-06-28 RX ADMIN — ENOXAPARIN SODIUM 120 MILLIGRAM(S): 100 INJECTION SUBCUTANEOUS at 06:00

## 2022-06-28 RX ADMIN — Medication 3 MILLIGRAM(S): at 21:50

## 2022-06-28 RX ADMIN — DIVALPROEX SODIUM 1000 MILLIGRAM(S): 500 TABLET, DELAYED RELEASE ORAL at 21:49

## 2022-06-28 RX ADMIN — Medication 50 MILLIGRAM(S): at 21:49

## 2022-06-28 RX ADMIN — OXYCODONE HYDROCHLORIDE 10 MILLIGRAM(S): 5 TABLET ORAL at 13:18

## 2022-06-28 RX ADMIN — Medication 75 MICROGRAM(S): at 06:01

## 2022-06-28 RX ADMIN — Medication 50 MILLIGRAM(S): at 23:15

## 2022-06-28 RX ADMIN — QUETIAPINE FUMARATE 50 MILLIGRAM(S): 200 TABLET, FILM COATED ORAL at 11:58

## 2022-06-28 RX ADMIN — QUETIAPINE FUMARATE 200 MILLIGRAM(S): 200 TABLET, FILM COATED ORAL at 17:29

## 2022-06-28 RX ADMIN — OXYCODONE HYDROCHLORIDE 10 MILLIGRAM(S): 5 TABLET ORAL at 18:28

## 2022-06-28 NOTE — PROGRESS NOTE ADULT - PROBLEM SELECTOR PLAN 7
RESOLVED  Plt downtrending since admission; plt now stable 2/2 lab error   - differential includes sepsis vs hep products (low clinical suspicion) vs Depakote induced (low suspicion)   - c/w lovenox

## 2022-06-28 NOTE — PROGRESS NOTE ADULT - PROBLEM SELECTOR PLAN 1
Patient presented with abscess of the L shoulder secondary to IM injection. No leukocytosis however was tachycardic and mildly hypotensive.  - previously had R shoulder abscess in the past  - CT L shoulder shows LUE abscess closely associated with the deltoid musculature. A couple foci of subcutaneous gas along the inferior margin of the collection are most likely related to recent upper extremity injection.   - surgery consulted, no indication for intervention, negative for nec fasc   - Ultrasound demonstrating R arm abscess   - (6/24) - Decision was made by IR team to hold off on I/D procedure  - ID consulted, rec switching clindamycin to vanc  - 6/25 - Pt now w infiltrated IV, swelling of RUE and hand w/ suspected thrombophlebitis.  - 6/25 Switched from IV vanco to oral clindamycin 2/2 infiltrated IV  - 6/27 IV access obtained, switched back to IV vanco 1.25g BID  - c/w oxycodone PRN for pain Patient presented with abscess of the L shoulder secondary to IM injection. No leukocytosis however was tachycardic and mildly hypotensive.  - previously had R shoulder abscess in the past  - CT L shoulder shows LUE abscess closely associated with the deltoid musculature. A couple foci of subcutaneous gas along the inferior margin of the collection are most likely related to recent upper extremity injection.   - surgery consulted, no indication for intervention, negative for nec fasc   - Ultrasound demonstrating R arm abscess   - (6/24) - Decision was made by IR team to hold off on I/D procedure  - ID consulted, rec switching clindamycin to vanc  - 6/25 - Pt now w infiltrated IV, swelling of RUE and hand w/ suspected thrombophlebitis.  - 6/25 Switched from IV vanco to oral clindamycin 2/2 infiltrated IV  - 6/27 IV access obtained, switched back to IV vanco 1.25g BID  - c/w IV vanco  - plan for I&D by IR tomorrow  - c/w oxycodone PRN for pain

## 2022-06-28 NOTE — PROGRESS NOTE ADULT - PROBLEM SELECTOR PLAN 9
Diet: regular  DVT: lovenox subQ BID    - episode of blood coated stool this am, c/f anal fissure Diet: regular  DVT: lovenox subQ BID    Case manger Carol: [phone]    Legal guardianship :   of Case Management Maria Elena Armijo: [phone]   Cliff Carlton Raza ([phone]),

## 2022-06-28 NOTE — PROGRESS NOTE ADULT - PROBLEM SELECTOR PLAN 4
Pt had episodic dysuria yesterday  - UA 6/26 wnl  - f/u UCx Pt had episodic dysuria 6/26  - UA 6/26 wnl  - Ucx 6/26 neg  - no longer reports dysuria, continue to monitor

## 2022-06-28 NOTE — PROGRESS NOTE ADULT - SUBJECTIVE AND OBJECTIVE BOX
***************************************************************  Fredrick Doyle, PGY1  Internal Medicine   [facility] Pager [phone]  [facility] Pager [phone]  ***************************************************************    YODIT MORROW  37y  MRN: [id number]  22 (10d)    Patient is a 37y old  Female who presents with a chief complaint of L shoulder pain (2022 18:28)      SUBJECTIVE / OVERNIGHT EVENTS: No acute overnight events. Pt seen and examined at bedside. Denies fevers, chills, CP, SOB, Abdominal pain, N/V, Constipation, Diarrhea. Last BM     12 Point ROS negative with the exception of the above    MEDICATIONS  (STANDING):  ARIPiprazole 30 milliGRAM(s) Oral daily  chlorhexidine 2% Cloths 1 Application(s) Topical daily  chlorproMAZINE    Tablet 50 milliGRAM(s) Oral <User Schedule>  diVALproex  milliGRAM(s) Oral daily  diVALproex ER 1000 milliGRAM(s) Oral at bedtime  enoxaparin Injectable 120 milliGRAM(s) SubCutaneous every 12 hours  levothyroxine 75 MICROGram(s) Oral daily  lurasidone 40 milliGRAM(s) Oral daily  melatonin 3 milliGRAM(s) Oral at bedtime  multivitamin/minerals/iron Oral Solution (CENTRUM) 15 milliLiter(s) Oral daily  pantoprazole   Suspension 40 milliGRAM(s) Oral daily  polyethylene glycol 3350 17 Gram(s) Oral every 12 hours  QUEtiapine 200 milliGRAM(s) Oral two times a day  QUEtiapine 50 milliGRAM(s) Oral <User Schedule>  senna 2 Tablet(s) Oral at bedtime  vancomycin  IVPB 1250 milliGRAM(s) IV Intermittent every 12 hours    MEDICATIONS  (PRN):  acetaminophen     Tablet .. 650 milliGRAM(s) Oral every 6 hours PRN Temp greater or equal to 38C (100.4F), Mild Pain (1 - 3)  ALBUTerol    90 MICROgram(s) HFA Inhaler 2 Puff(s) Inhalation every 6 hours PRN Shortness of Breath and/or Wheezing  chlorproMAZINE    Injectable 50 milliGRAM(s) IntraMuscular every 4 hours PRN Agitation  diphenhydrAMINE 50 milliGRAM(s) Oral every 8 hours PRN Rash and/or Itching  diphenhydrAMINE Elixir 25 milliGRAM(s) Oral every 8 hours PRN Itching  oxyCODONE    IR 5 milliGRAM(s) Oral every 4 hours PRN Moderate Pain (4 - 6)  oxyCODONE    IR 10 milliGRAM(s) Oral every 4 hours PRN Severe Pain (7 - 10)      OBJECTIVE:  Vital Signs Last 24 Hrs  T(C): 36.5 (2022 22:08), Max: 37.1 (2022 12:28)  T(F): 97.7 (2022 22:08), Max: 98.8 (2022 12:28)  HR: 90 (2022 22:08) (90 - 95)  BP: 101/65 (2022 22:08) (101/65 - 104/66)  BP(mean): --  RR: 17 (2022 22:08) (17 - 18)  SpO2: 100% (2022 22:08) (100% - 100%)    I&O's Summary      PHYSICAL EXAM:  GENERAL: Laying comfortably, NAD  HEENT: NCAT, PERRLA, EOMI, no scleral icterus, no LAD  NECK: No JVD  LUNG: CTABL; No wheezes, crackles, or rhonchi  HEART: RRR; normal S1/S2; No murmurs, rubs, or gallops  ABDOMEN: +BS, soft, nontender, nondistended, no HSM; No rebound, guarding, or rigidity  EXTREMITIES:  No LE edema b/l, 2+ Peripheral Pulses, No clubbing, cyanosis, or edema  NEUROLOGY: AOx3, non-focal, strength 5/5 in all extremities, sensation intact  PSYCH: calm and cooperative  SKIN: No rashes or lesions    LABS:                        10.1   3.99  )-----------( 151      ( 2022 10:43 )             32.5     Auto Eosinophil # x     / Auto Eosinophil % x     / Auto Neutrophil # x     / Auto Neutrophil % x     / BANDS % x            141  |  106  |  8   ----------------------------<  117<H>  4.5   |  25  |  0.77    Ca    9.1      2022 10:43  Phos  3.5       Mg     1.90                 Urinalysis Basic - ( 2022 14:06 )    Color: Yellow / Appearance: Clear / S.027 / pH: x  Gluc: x / Ketone: Negative  / Bili: Negative / Urobili: 3 mg/dL   Blood: x / Protein: Trace / Nitrite: Negative   Leuk Esterase: Negative / RBC: 1 /HPF / WBC 1 /HPF   Sq Epi: x / Non Sq Epi: 1 /HPF / Bacteria: x          CAPILLARY BLOOD GLUCOSE            RADIOLOGY & ADDITIONAL TESTS:      Imaging Personally Reviewed: [ ] YES  [ ] NO    Consultants involved in case:   Consultant(s) Notes Reviewed:  [ ] YES  [ ] NO:   Care Discussed with Consultants/Other Providers [ ] YES  [ ] NO ***************************************************************  Fredrick Doyle, PGY1  Internal Medicine   [facility] Pager [phone]  [facility] Pager [phone]  ***************************************************************    YODIT MORROW  37y  MRN: [id number]  22 (10d)    Patient is a 37y old  Female who presents with a chief complaint of L shoulder pain (2022 18:28)      SUBJECTIVE / OVERNIGHT EVENTS: Received thorazine 50mg once @6am for self reported agitation. Pt seen and examined at bedside. Denies fevers, chills, CP, SOB, Abdominal pain, N/V, Constipation, Diarrhea. Reports L should pain, R hand weakness, decreased R arm pain, resolution of R hip pain, and no L leg pain. Reports increased left leg edema.     12 Point ROS negative with the exception of the above    MEDICATIONS  (STANDING):  ARIPiprazole 30 milliGRAM(s) Oral daily  chlorhexidine 2% Cloths 1 Application(s) Topical daily  chlorproMAZINE    Tablet 50 milliGRAM(s) Oral <User Schedule>  diVALproex  milliGRAM(s) Oral daily  diVALproex ER 1000 milliGRAM(s) Oral at bedtime  enoxaparin Injectable 120 milliGRAM(s) SubCutaneous every 12 hours  levothyroxine 75 MICROGram(s) Oral daily  lurasidone 40 milliGRAM(s) Oral daily  melatonin 3 milliGRAM(s) Oral at bedtime  multivitamin/minerals/iron Oral Solution (CENTRUM) 15 milliLiter(s) Oral daily  pantoprazole   Suspension 40 milliGRAM(s) Oral daily  polyethylene glycol 3350 17 Gram(s) Oral every 12 hours  QUEtiapine 200 milliGRAM(s) Oral two times a day  QUEtiapine 50 milliGRAM(s) Oral <User Schedule>  senna 2 Tablet(s) Oral at bedtime  vancomycin  IVPB 1250 milliGRAM(s) IV Intermittent every 12 hours    MEDICATIONS  (PRN):  acetaminophen     Tablet .. 650 milliGRAM(s) Oral every 6 hours PRN Temp greater or equal to 38C (100.4F), Mild Pain (1 - 3)  ALBUTerol    90 MICROgram(s) HFA Inhaler 2 Puff(s) Inhalation every 6 hours PRN Shortness of Breath and/or Wheezing  chlorproMAZINE    Injectable 50 milliGRAM(s) IntraMuscular every 4 hours PRN Agitation  diphenhydrAMINE 50 milliGRAM(s) Oral every 8 hours PRN Rash and/or Itching  diphenhydrAMINE Elixir 25 milliGRAM(s) Oral every 8 hours PRN Itching  oxyCODONE    IR 5 milliGRAM(s) Oral every 4 hours PRN Moderate Pain (4 - 6)  oxyCODONE    IR 10 milliGRAM(s) Oral every 4 hours PRN Severe Pain (7 - 10)      OBJECTIVE:  Vital Signs Last 24 Hrs  T(C): 36.5 (2022 22:08), Max: 37.1 (2022 12:28)  T(F): 97.7 (2022 22:08), Max: 98.8 (2022 12:28)  HR: 90 (2022 22:08) (90 - 95)  BP: 101/65 (2022 22:08) (101/65 - 104/66)  BP(mean): --  RR: 17 (2022 22:08) (17 - 18)  SpO2: 100% (2022 22:08) (100% - 100%)    I&O's Summary      PHYSICAL EXAM:  GENERAL: Laying comfortably, NAD  HEENT: NCAT, EOMI, no scleral icterus, no LAD  LUNG: CTABL; No wheezes, crackles, or rhonchi  HEART: RRR; normal S1/S2; No murmurs, rubs, or gallops  ABDOMEN: +BS, soft, nondistended, nontender  EXTREMITIES:  2+ peripheral pulses throughout; RUE: edema improved, warm, pulses intact, movement and sensation intact, strength for flexion/extension, R hand finger squeeze diminished vs L. LUE: warmth and edema from shoulder through arm, L shoulder area of indurated. LLE edema > RLE edema, 2+ B/l.   NEUROLOGY: AOx3, non-focal, strength 5/5 in all extremities, sensation intact  PSYCH: calm and cooperative  SKIN: No rashes or lesions    LABS:                        12.2   3.94  )-----------( 156      ( 2022 07:01 )             39.2                         10.1   3.99  )-----------( 151      ( 2022 10:43 )             32.5     Auto Eosinophil # x     / Auto Eosinophil % x     / Auto Neutrophil # x     / Auto Neutrophil % x     / BANDS % x      -    140  |  104  |  8   ----------------------------<  96  4.5   |  23  |  0.82    Ca    9.3      2022 07:01  Phos  4.4     06-28  Mg     2.00     -    TPro  7.3  /  Alb  4.1  /  TBili  0.2  /  DBili  x   /  AST  20  /  ALT  20  /  AlkPhos  50  -28        141  |  106  |  8   ----------------------------<  117<H>  4.5   |  25  |  0.77    Ca    9.1      2022 10:43  Phos  3.5     06-27  Mg     1.90           Urinalysis Basic - ( 2022 14:06 )    Color: Yellow / Appearance: Clear / S.027 / pH: x  Gluc: x / Ketone: Negative  / Bili: Negative / Urobili: 3 mg/dL   Blood: x / Protein: Trace / Nitrite: Negative   Leuk Esterase: Negative / RBC: 1 /HPF / WBC 1 /HPF   Sq Epi: x / Non Sq Epi: 1 /HPF / Bacteria: x    Culture - Urine (22 @ 16:37)   Specimen Source: Clean Catch Clean Catch (Midstream)   Culture Results:   <10,000 CFU/mL Normal Urogenital Samira     Consultants involved in case: IR, ID  Consultant(s) Notes Reviewed:  [X] YES  [ ] NO:   Care Discussed with Consultants/Other Providers [X] YES  [ ] NO

## 2022-06-28 NOTE — PROGRESS NOTE ADULT - PROBLEM SELECTOR PLAN 3
Patient has hx of bipolar disorder and is currently on multiple antipsychotics: High dose seroquel, abilify, chlorpromazine ,lurasidone, tizanidine  - Qtc 460   - Abilfy 30mg daily, Latuda 40mg at noon  - Depakote  daily and 1000 night   - Seroquel 200mg at 8am and 8pm and at 12PM   - Chlorpromazine 50mg 4x daily   - If Agitated: Chlorpromazine 50mg IM q4h prn with Ativan 2mg IM q4h prn but avoid arms   - Psych following, recs appreciated

## 2022-06-28 NOTE — PROGRESS NOTE ADULT - PROBLEM SELECTOR PLAN 6
Patient has Hx of recurrent seizure like activity; Has been evaluated by neurology in the past with negative EEG: Activity consistent with pseudoseizure   - S/p RRT for jerking movement in upper extremities (6/21); resolved without medications being given   - Low concern for seizure given presentation and prior history, continue to monitor at this time  - c/w depakote

## 2022-06-28 NOTE — PROGRESS NOTE ADULT - PROBLEM SELECTOR PLAN 2
Patient unknown timeframe of LE swelling b/l; believes that it started in March   - Hx of factor V Leiden  - Pro BNP w/i nl  - reports LLE pain w/ edema > RLE  - 6/14 US doppler of b/l LE: no evidence of DVTs  - 6/27 LLE duplex ultrasound - no evidence of DVT  - continue to monitor    #New rip hip induration 6/27  - reports at site of prior injection  - receiving IV vanco at this time  - continue to monitor Patient unknown timeframe of LE swelling b/l; believes that it started in March   - Hx of factor V Leiden  - Pro BNP w/i nl  - reports LLE pain w/ edema > RLE  - 6/14 US doppler of b/l LE: no evidence of DVTs  - 6/27 LLE duplex ultrasound - no evidence of DVT  - 6/28 b/l LE swelling, recommend ace bandages/compression stockings  - continue to monitor    #New rip hip induration 6/27   - reports at site of prior injection  - receiving IV vanco at this time  - continue to monitor

## 2022-06-28 NOTE — PROGRESS NOTE ADULT - SUBJECTIVE AND OBJECTIVE BOX
Vascular & Interventional Radiology    Interval history: Reviewed most recent CT and US. Patient now has legal guardians as described in chart note on 06/27/2022.     Allergies: latex (Blisters)  penicillin (Hives)  penicillin (Other)  pineapple (Unknown)  Toradol (Rash)  Toradol (Unknown)  Zofran (Hives)  Zofran (Unknown)    Medications (Abx/Cardiac/Anticoagulation/Blood Products)    clindamycin   Capsule: 300 milliGRAM(s) Oral (06-27 @ 11:16)  enoxaparin Injectable: 120 milliGRAM(s) SubCutaneous (06-28 @ 06:00)  vancomycin  IVPB: 250 mL/Hr IV Intermittent (06-28 @ 05:59)    Data:    T(C): 36.4  HR: 95  BP: 134/82  RR: 18  SpO2: 100%    -WBC 3.94 / HgB 12.2 / Hct 39.2 / Plt 156  -Na 140 / Cl 104 / BUN 8 / Glucose 96  -K 4.5 / CO2 23 / Cr 0.82  -ALT 20 / Alk Phos 50 / T.Bili 0.2      Assessment/Plan:  36 y/o female with left deltoid abscess seen on most recent imaging for which IR was consulted for fine needle aspiration.     -Plan for procedure on 06/29/2022; please place IR procedure order under Dr. Juan Payne  -Patient NPO at midnight   -Hold anticoagulation remainder of today/tomorrow   -Please order 4 AM CBC and BMP

## 2022-06-29 LAB
ALBUMIN SERPL ELPH-MCNC: 3.7 G/DL — SIGNIFICANT CHANGE UP (ref 3.3–5)
ALP SERPL-CCNC: 47 U/L — SIGNIFICANT CHANGE UP (ref 40–120)
ALT FLD-CCNC: 26 U/L — SIGNIFICANT CHANGE UP (ref 4–33)
ANION GAP SERPL CALC-SCNC: 10 MMOL/L — SIGNIFICANT CHANGE UP (ref 7–14)
APTT BLD: 21.3 SEC — LOW (ref 27–36.3)
AST SERPL-CCNC: 26 U/L — SIGNIFICANT CHANGE UP (ref 4–32)
B PERT DNA SPEC QL NAA+PROBE: SIGNIFICANT CHANGE UP
B PERT+PARAPERT DNA PNL SPEC NAA+PROBE: SIGNIFICANT CHANGE UP
BILIRUB SERPL-MCNC: <0.2 MG/DL — SIGNIFICANT CHANGE UP (ref 0.2–1.2)
BORDETELLA PARAPERTUSSIS (RAPRVP): SIGNIFICANT CHANGE UP
BUN SERPL-MCNC: 9 MG/DL — SIGNIFICANT CHANGE UP (ref 7–23)
C PNEUM DNA SPEC QL NAA+PROBE: SIGNIFICANT CHANGE UP
CALCIUM SERPL-MCNC: 8.9 MG/DL — SIGNIFICANT CHANGE UP (ref 8.4–10.5)
CHLORIDE SERPL-SCNC: 104 MMOL/L — SIGNIFICANT CHANGE UP (ref 98–107)
CO2 SERPL-SCNC: 25 MMOL/L — SIGNIFICANT CHANGE UP (ref 22–31)
CREAT SERPL-MCNC: 0.87 MG/DL — SIGNIFICANT CHANGE UP (ref 0.5–1.3)
EGFR: 89 ML/MIN/1.73M2 — SIGNIFICANT CHANGE UP
FLUAV SUBTYP SPEC NAA+PROBE: SIGNIFICANT CHANGE UP
FLUBV RNA SPEC QL NAA+PROBE: SIGNIFICANT CHANGE UP
GLUCOSE SERPL-MCNC: 110 MG/DL — HIGH (ref 70–99)
HADV DNA SPEC QL NAA+PROBE: SIGNIFICANT CHANGE UP
HCG SERPL-ACNC: <5 MIU/ML — SIGNIFICANT CHANGE UP
HCOV 229E RNA SPEC QL NAA+PROBE: SIGNIFICANT CHANGE UP
HCOV HKU1 RNA SPEC QL NAA+PROBE: SIGNIFICANT CHANGE UP
HCOV NL63 RNA SPEC QL NAA+PROBE: SIGNIFICANT CHANGE UP
HCOV OC43 RNA SPEC QL NAA+PROBE: SIGNIFICANT CHANGE UP
HCT VFR BLD CALC: 31.8 % — LOW (ref 34.5–45)
HGB BLD-MCNC: 10.2 G/DL — LOW (ref 11.5–15.5)
HMPV RNA SPEC QL NAA+PROBE: SIGNIFICANT CHANGE UP
HPIV1 RNA SPEC QL NAA+PROBE: SIGNIFICANT CHANGE UP
HPIV2 RNA SPEC QL NAA+PROBE: SIGNIFICANT CHANGE UP
HPIV3 RNA SPEC QL NAA+PROBE: SIGNIFICANT CHANGE UP
HPIV4 RNA SPEC QL NAA+PROBE: SIGNIFICANT CHANGE UP
INR BLD: 1.09 RATIO — SIGNIFICANT CHANGE UP (ref 0.88–1.16)
M PNEUMO DNA SPEC QL NAA+PROBE: SIGNIFICANT CHANGE UP
MAGNESIUM SERPL-MCNC: 1.9 MG/DL — SIGNIFICANT CHANGE UP (ref 1.6–2.6)
MCHC RBC-ENTMCNC: 29.9 PG — SIGNIFICANT CHANGE UP (ref 27–34)
MCHC RBC-ENTMCNC: 32.1 GM/DL — SIGNIFICANT CHANGE UP (ref 32–36)
MCV RBC AUTO: 93.3 FL — SIGNIFICANT CHANGE UP (ref 80–100)
NRBC # BLD: 0 /100 WBCS — SIGNIFICANT CHANGE UP
NRBC # FLD: 0 K/UL — SIGNIFICANT CHANGE UP
PHOSPHATE SERPL-MCNC: 4.5 MG/DL — SIGNIFICANT CHANGE UP (ref 2.5–4.5)
PLATELET # BLD AUTO: 150 K/UL — SIGNIFICANT CHANGE UP (ref 150–400)
POTASSIUM SERPL-MCNC: 4.4 MMOL/L — SIGNIFICANT CHANGE UP (ref 3.5–5.3)
POTASSIUM SERPL-SCNC: 4.4 MMOL/L — SIGNIFICANT CHANGE UP (ref 3.5–5.3)
PROT SERPL-MCNC: 6.8 G/DL — SIGNIFICANT CHANGE UP (ref 6–8.3)
PROTHROM AB SERPL-ACNC: 12.7 SEC — SIGNIFICANT CHANGE UP (ref 10.5–13.4)
RAPID RVP RESULT: SIGNIFICANT CHANGE UP
RBC # BLD: 3.41 M/UL — LOW (ref 3.8–5.2)
RBC # FLD: 15.5 % — HIGH (ref 10.3–14.5)
RSV RNA SPEC QL NAA+PROBE: SIGNIFICANT CHANGE UP
RV+EV RNA SPEC QL NAA+PROBE: SIGNIFICANT CHANGE UP
SARS-COV-2 RNA SPEC QL NAA+PROBE: SIGNIFICANT CHANGE UP
SODIUM SERPL-SCNC: 139 MMOL/L — SIGNIFICANT CHANGE UP (ref 135–145)
VANCOMYCIN TROUGH SERPL-MCNC: 11.1 UG/ML — SIGNIFICANT CHANGE UP (ref 10–20)
WBC # BLD: 4.04 K/UL — SIGNIFICANT CHANGE UP (ref 3.8–10.5)
WBC # FLD AUTO: 4.04 K/UL — SIGNIFICANT CHANGE UP (ref 3.8–10.5)

## 2022-06-29 PROCEDURE — 99232 SBSQ HOSP IP/OBS MODERATE 35: CPT

## 2022-06-29 PROCEDURE — 20611 DRAIN/INJ JOINT/BURSA W/US: CPT | Mod: LT

## 2022-06-29 RX ORDER — MAGNESIUM SULFATE 500 MG/ML
1 VIAL (ML) INJECTION ONCE
Refills: 0 | Status: DISCONTINUED | OUTPATIENT
Start: 2022-06-29 | End: 2022-06-29

## 2022-06-29 RX ADMIN — DIVALPROEX SODIUM 1000 MILLIGRAM(S): 500 TABLET, DELAYED RELEASE ORAL at 21:49

## 2022-06-29 RX ADMIN — OXYCODONE HYDROCHLORIDE 10 MILLIGRAM(S): 5 TABLET ORAL at 13:01

## 2022-06-29 RX ADMIN — OXYCODONE HYDROCHLORIDE 10 MILLIGRAM(S): 5 TABLET ORAL at 16:40

## 2022-06-29 RX ADMIN — QUETIAPINE FUMARATE 200 MILLIGRAM(S): 200 TABLET, FILM COATED ORAL at 07:17

## 2022-06-29 RX ADMIN — Medication 50 MILLIGRAM(S): at 07:17

## 2022-06-29 RX ADMIN — Medication 3 MILLIGRAM(S): at 21:49

## 2022-06-29 RX ADMIN — OXYCODONE HYDROCHLORIDE 10 MILLIGRAM(S): 5 TABLET ORAL at 12:01

## 2022-06-29 RX ADMIN — LURASIDONE HYDROCHLORIDE 40 MILLIGRAM(S): 40 TABLET ORAL at 16:40

## 2022-06-29 RX ADMIN — Medication 50 MILLIGRAM(S): at 11:52

## 2022-06-29 RX ADMIN — Medication 15 MILLILITER(S): at 11:55

## 2022-06-29 RX ADMIN — Medication 75 MICROGRAM(S): at 07:17

## 2022-06-29 RX ADMIN — SENNA PLUS 2 TABLET(S): 8.6 TABLET ORAL at 21:49

## 2022-06-29 RX ADMIN — OXYCODONE HYDROCHLORIDE 10 MILLIGRAM(S): 5 TABLET ORAL at 22:45

## 2022-06-29 RX ADMIN — Medication 250 MILLIGRAM(S): at 07:17

## 2022-06-29 RX ADMIN — DIVALPROEX SODIUM 500 MILLIGRAM(S): 500 TABLET, DELAYED RELEASE ORAL at 11:53

## 2022-06-29 RX ADMIN — ARIPIPRAZOLE 30 MILLIGRAM(S): 15 TABLET ORAL at 11:52

## 2022-06-29 RX ADMIN — CHLORHEXIDINE GLUCONATE 1 APPLICATION(S): 213 SOLUTION TOPICAL at 11:54

## 2022-06-29 RX ADMIN — QUETIAPINE FUMARATE 200 MILLIGRAM(S): 200 TABLET, FILM COATED ORAL at 17:50

## 2022-06-29 RX ADMIN — QUETIAPINE FUMARATE 50 MILLIGRAM(S): 200 TABLET, FILM COATED ORAL at 11:54

## 2022-06-29 RX ADMIN — Medication 250 MILLIGRAM(S): at 17:50

## 2022-06-29 RX ADMIN — OXYCODONE HYDROCHLORIDE 10 MILLIGRAM(S): 5 TABLET ORAL at 21:59

## 2022-06-29 RX ADMIN — OXYCODONE HYDROCHLORIDE 10 MILLIGRAM(S): 5 TABLET ORAL at 17:40

## 2022-06-29 RX ADMIN — Medication 50 MILLIGRAM(S): at 20:16

## 2022-06-29 RX ADMIN — Medication 50 MILLIGRAM(S): at 23:13

## 2022-06-29 NOTE — BH CONSULTATION LIAISON PROGRESS NOTE - NSBHCONSULTFOLLOWAFTERCARE_PSY_A_CORE FT
f/u with community psychiatrist upon d/c. 
f/u with outpatient psychiatrist upon d/c
f/u with outpatient psychiatrist when she returns back to group home. Will continue to f/u with patient during lij course

## 2022-06-29 NOTE — PROGRESS NOTE ADULT - PROBLEM SELECTOR PLAN 9
Diet: regular  DVT: lovenox subQ BID    Case manger Carol: [phone]    Legal guardianship :   of Case Management Maria Elena Armijo: [phone]   Cliff Carlton Raza ([phone]), Diet: regular  DVT: lovenox subQ BID, holding for IR procedure    Case antony Carol: [phone]    Legal guardianship :   of Case Management Maria Elena Armijo: [phone]   Cliff Carlton Raza ([phone]),

## 2022-06-29 NOTE — PROVIDER CONTACT NOTE (OTHER) - ACTION/TREATMENT ORDERED:
pt found with seizure like activity. called RRT and stat labs done. monitored vitals and evaluated by MD at bedside. pt returned to prior mental status later without any intervention.
Will continue to monitor patient
no intervention at this time
MD notified. no new interventions at this time. will continue nursing care.
no recommendation at this time. will continue monitoring.

## 2022-06-29 NOTE — BH CONSULTATION LIAISON PROGRESS NOTE - NSBHCHARTREVIEWLAB_PSY_A_CORE FT
CBC Full  -  ( 20 Jun 2022 06:57 )  WBC Count : 4.32 K/uL  RBC Count : 3.41 M/uL  Hemoglobin : 10.2 g/dL  Hematocrit : 32.5 %  Platelet Count - Automated : 50 K/uL  Mean Cell Volume : 95.3 fL  Mean Cell Hemoglobin : 29.9 pg  Mean Cell Hemoglobin Concentration : 31.4 gm/dL  Auto Neutrophil # : 1.70 K/uL  Auto Lymphocyte # : 2.11 K/uL  Auto Monocyte # : 0.43 K/uL  Auto Eosinophil # : 0.05 K/uL  Auto Basophil # : 0.02 K/uL  Auto Neutrophil % : 39.3 %  Auto Lymphocyte % : 48.8 %  Auto Monocyte % : 10.0 %  Auto Eosinophil % : 1.2 %  Auto Basophil % : 0.5 %  06-20    135  |  101  |  8   ----------------------------<  93  4.8   |  17<L>  |  0.71    Ca    9.3      20 Jun 2022 10:15  Phos  3.6     06-19  Mg     1.80     06-19    TPro  7.6  /  Alb  4.2  /  TBili  0.2  /  DBili  x   /  AST  22  /  ALT  24  /  AlkPhos  50  06-20  
CBC Full  -  ( 29 Jun 2022 05:00 )  WBC Count : 4.04 K/uL  RBC Count : 3.41 M/uL  Hemoglobin : 10.2 g/dL  Hematocrit : 31.8 %  Platelet Count - Automated : 150 K/uL  Mean Cell Volume : 93.3 fL  Mean Cell Hemoglobin : 29.9 pg  Mean Cell Hemoglobin Concentration : 32.1 gm/dL  Auto Neutrophil # : x  Auto Lymphocyte # : x  Auto Monocyte # : x  Auto Eosinophil # : x  Auto Basophil # : x  Auto Neutrophil % : x  Auto Lymphocyte % : x  Auto Monocyte % : x  Auto Eosinophil % : x  Auto Basophil % : x  06-29    139  |  104  |  9   ----------------------------<  110<H>  4.4   |  25  |  0.87    Ca    8.9      29 Jun 2022 05:00  Phos  4.5     06-29  Mg     1.90     06-29    TPro  6.8  /  Alb  3.7  /  TBili  <0.2  /  DBili  x   /  AST  26  /  ALT  26  /  AlkPhos  47  06-29  
CBC Full  -  ( 20 Jun 2022 23:42 )  WBC Count : 4.38 K/uL  RBC Count : 3.71 M/uL  Hemoglobin : 10.9 g/dL  Hematocrit : 34.3 %  Platelet Count - Automated : 141 K/uL  Mean Cell Volume : 92.5 fL  Mean Cell Hemoglobin : 29.4 pg  Mean Cell Hemoglobin Concentration : 31.8 gm/dL  Auto Neutrophil # : x  Auto Lymphocyte # : x  Auto Monocyte # : x  Auto Eosinophil # : x  Auto Basophil # : x  Auto Neutrophil % : x  Auto Lymphocyte % : x  Auto Monocyte % : x  Auto Eosinophil % : x  Auto Basophil % : x  06-20    137  |  104  |  8   ----------------------------<  94  4.5   |  22  |  0.79    Ca    9.4      20 Jun 2022 23:42  Phos  4.2     06-20  Mg     1.80     06-20    TPro  7.6  /  Alb  4.2  /  TBili  0.2  /  DBili  x   /  AST  22  /  ALT  24  /  AlkPhos  50  06-20

## 2022-06-29 NOTE — BH CONSULTATION LIAISON PROGRESS NOTE - NSBHFUPINTERVALCCFT_PSY_A_CORE
Reviewed initial beh health notes.   Safety maintained on 1:1  Reviewed medicine notes--coordinated. Coordinated with pca at bedside  Decision made to review old records. Reviewed prior psychiatry records to understand psychiatric history  Compliant with medications- insists on Thorazine IM  as per records===CT L shoulder show Left upper extremity abscess closely associated with the deltoid musculature. A couple foci of subcutaneous gas along the inferior margin of the collection are most likely related to recent upper extremity injection.   on clindamycin 600 TID IV  - On IR schedule for Tues 6/21, no need to be NPO.
behaviors under control, no prn needs, staff able to manage patient on current observation status- routine.   chart reviewed, labs reviewed  Coordinated with medicine team-- aware of plan  reviewed medicine , IR notes  
reviewed chart, labs  rrt last night-- likely non epileptic seizures  IR drainage of abscess today  No prn needs  Can be opioid and bzd seeking  Medicine team aware of plan below. Reviewed old records.   Can be attention seeking

## 2022-06-29 NOTE — PROCEDURE NOTE - PROCEDURE FINDINGS AND DETAILS
Left shoulder / deltoid aspiration performed using US guidance. Appx 4cc dark red fluid aspirated. Sample collected for micro.  Pt tolerated procedure without difficulty. Full report to follow.

## 2022-06-29 NOTE — PROVIDER CONTACT NOTE (OTHER) - BACKGROUND
patient admitted for left arm abscess with a past medical history of BPD, GERD.
pt is admitted with LT arm cellulitis
pt admitted for abscess of left deltoid
Patient admitted with cutaneous abscess of left upper extremity
pt is admitted with LT arm cellulitis

## 2022-06-29 NOTE — BH CONSULTATION LIAISON PROGRESS NOTE - NSICDXBHSECONDARYDX_PSY_ALL_CORE
Intellectual disability   F79  Intermittent explosive disorder   F63.81  

## 2022-06-29 NOTE — PROGRESS NOTE ADULT - PROBLEM SELECTOR PLAN 1
Patient presented with abscess of the L shoulder secondary to IM injection. No leukocytosis however was tachycardic and mildly hypotensive.  - previously had R shoulder abscess in the past  - CT L shoulder shows LUE abscess closely associated with the deltoid musculature. A couple foci of subcutaneous gas along the inferior margin of the collection are most likely related to recent upper extremity injection.   - surgery consulted, no indication for intervention, negative for nec fasc   - Ultrasound demonstrating R arm abscess   - (6/24) - Decision was made by IR team to hold off on I/D procedure  - ID consulted, rec switching clindamycin to vanc  - 6/25 - Pt now w infiltrated IV, swelling of RUE and hand w/ suspected thrombophlebitis.  - 6/25 Switched from IV vanco to oral clindamycin 2/2 infiltrated IV  - 6/27 IV access obtained, switched back to IV vanco 1.25g BID  - c/w IV vanco  - plan for I&D by IR tomorrow  - c/w oxycodone PRN for pain Patient presented with abscess of the L shoulder secondary to IM injection. No leukocytosis however was tachycardic and mildly hypotensive.  - previously had R shoulder abscess in the past  - CT L shoulder shows LUE abscess closely associated with the deltoid musculature. A couple foci of subcutaneous gas along the inferior margin of the collection are most likely related to recent upper extremity injection.   - surgery consulted, no indication for intervention, negative for nec fasc   - Ultrasound demonstrating R arm abscess   - (6/24) - Decision was made by IR team to hold off on I/D procedure  - ID consulted, rec switching clindamycin to vanc  - 6/25 - Pt now w infiltrated IV, swelling of RUE and hand w/ suspected thrombophlebitis.  - 6/25 Switched from IV vanco to oral clindamycin 2/2 infiltrated IV  - 6/27 IV access obtained, switched back to IV vanco 1.25g BID  - c/w IV vanco  -  I&D 6/29 by IR - f/u cultures  - c/w oxycodone PRN for pain

## 2022-06-29 NOTE — CHART NOTE - NSCHARTNOTEFT_GEN_A_CORE
IR Pre-Procedure Note    Patient Age:   37y    Patient Gender:   Female    Procedure (including site / side if known): Left deltoid abscess drainage    Diagnosis / Indication: Patient is a 37y old  Female who presents with a chief complaint of L shoulder pain (29 Jun 2022 06:17)      Interventional Radiology Attending Physician: Dr. Juan Payne    Ordering Attending Physician: Dr. Dunia Cortez    PAST MEDICAL & SURGICAL HISTORY:  Asthma      Seizure      Factor V Leiden      Bipolar disorder      Endometriosis      History of DVT in adulthood  RLE on eliquis      Hypothyroid      Seasonal allergies      Insomnia      GERD (gastroesophageal reflux disease)      No significant past surgical history           Pertinent Labs:   CBC Full  -  ( 29 Jun 2022 05:00 )  WBC Count : 4.04 K/uL  RBC Count : 3.41 M/uL  Hemoglobin : 10.2 g/dL  Hematocrit : 31.8 %  Platelet Count - Automated : 150 K/uL  Mean Cell Volume : 93.3 fL  Mean Cell Hemoglobin : 29.9 pg  Mean Cell Hemoglobin Concentration : 32.1 gm/dL  Auto Neutrophil # : x  Auto Lymphocyte # : x  Auto Monocyte # : x  Auto Eosinophil # : x  Auto Basophil # : x  Auto Neutrophil % : x  Auto Lymphocyte % : x  Auto Monocyte % : x  Auto Eosinophil % : x  Auto Basophil % : x    06-29    139  |  104  |  9   ----------------------------<  110<H>  4.4   |  25  |  0.87    Ca    8.9      29 Jun 2022 05:00  Phos  4.5     06-29  Mg     1.90     06-29    TPro  6.8  /  Alb  3.7  /  TBili  <0.2  /  DBili  x   /  AST  26  /  ALT  26  /  AlkPhos  47  06-29    PT/INR - ( 29 Jun 2022 05:00 )   PT: 12.7 sec;   INR: 1.09 ratio         PTT - ( 29 Jun 2022 05:00 )  PTT:21.3 sec    Patient / Family aware of procedure:   [x] Y   [  ] N    David Mirza MD  [id number]

## 2022-06-29 NOTE — PROGRESS NOTE ADULT - PROBLEM SELECTOR PLAN 8
- Hx of RLE DVT, on Eliquis 5mg BID outpatient   - c/w lovenox - Hx of RLE DVT, on Eliquis 5mg BID outpatient   - holding lovenox for IR procedure

## 2022-06-29 NOTE — BH CONSULTATION LIAISON PROGRESS NOTE - NSBHATTESTBILLINGAW_PSY_A_CORE
[id number] Inpatient care - moderate complexity - 25 minutes
[id number] Inpatient care - moderate complexity - 25 minutes

## 2022-06-29 NOTE — PROGRESS NOTE ADULT - PROBLEM SELECTOR PLAN 2
Patient unknown timeframe of LE swelling b/l; believes that it started in March   - Hx of factor V Leiden  - Pro BNP w/i nl  - reports LLE pain w/ edema > RLE  - 6/14 US doppler of b/l LE: no evidence of DVTs  - 6/27 LLE duplex ultrasound - no evidence of DVT  - 6/28 b/l LE swelling, recommend ace bandages/compression stockings  - continue to monitor    #New rip hip induration 6/27   - reports at site of prior injection  - receiving IV vanco at this time  - continue to monitor Patient unknown timeframe of LE swelling b/l; believes that it started in March   - Hx of factor V Leiden  - Pro BNP w/i nl  - reports LLE pain w/ edema > RLE  - 6/14 US doppler of b/l LE: no evidence of DVTs  - 6/27 LLE duplex ultrasound - no evidence of DVT  - 6/28 b/l LE swelling, recommend ace bandages/compression stockings  - 6/29 improving w/ ace bandages  - continue to monitor

## 2022-06-29 NOTE — PROCEDURE NOTE - NSINFORMCONSENT_GEN_A_CORE
from HCP/Benefits, risks, and possible complications of procedure explained to patient/caregiver who verbalized understanding and gave written consent.

## 2022-06-29 NOTE — BH CONSULTATION LIAISON PROGRESS NOTE - NSBHFUPINTERVALHXFT_PSY_A_CORE
cc: I am ok  Met with patient. Calm, denies any mood symptoms, denies any si or hi, no psychosis
Met with patient this morning. Staff was trying to coax patient to wear hospital gowns rather than street clothes. She kept negotiating IM Thorazine. When approached, patient did not wish to speak with MD, but only wanted - ' I need you to fix the orders so that I get IM Thorazine'. Does deny any si or hi, or ah or vh
Met with the patient. Calmer than yesterday. Comfortably sitting in bed, eating a bagel and cream cheese. States that staff is treating her and that 'I like it here'. She denies any mood symptoms, denies any si or hi, and denies any ah or vh or paranoia when asked.

## 2022-06-29 NOTE — PRE PROCEDURE NOTE - HISTORY OF PRESENT ILLNESS
Interventional Radiology  Pre-Procedure Note    This is a 37y  Female  with a left shoulder fluid collection    HPI:  This is a 37 y/o F with pmhx of bipolar disorder, hypothyroidism, factor V Leiden on eliquis, seizures, presented to the ED for new onset L shoulder pain. The patient was previously admitted at [place] for R shoulder pain after an IM injection. Work up showed a R sided abscess. She had I and D and IR drainage of the abscess collection. 2 days ago while she was still admitted, she also had received an IM injection on the L arm during that admission for anxiety. She was then discharged with doxycycline. The patient now returns with new onset L sided shoulder pain and swelling started today. The patient also appears lethargic appearing and having chills. The patient endorsed pain on the L deltoid area where she got her IM injection with mild erythema. Pain also tracks down medially down to the elbow in the medial epicondyle with mild swelling. Denies chest pain, SOB, abdominal pain, dysuria. (18 Jun 2022 02:41)      PAST MEDICAL & SURGICAL HISTORY:  Asthma      Seizure      Factor V Leiden      Bipolar disorder      Endometriosis      History of DVT in adulthood  RLE on eliquis      Hypothyroid      Seasonal allergies      Insomnia      GERD (gastroesophageal reflux disease)      No significant past surgical history          Social History:     FAMILY HISTORY:  Family history of DVT (Mother)        Allergies: latex (Blisters)  penicillin (Hives)  penicillin (Other)  pineapple (Unknown)  Toradol (Rash)  Toradol (Unknown)  Zofran (Hives)  Zofran (Unknown)      Current Medications: acetaminophen     Tablet .. 650 milliGRAM(s) Oral every 6 hours PRN  ALBUTerol    90 MICROgram(s) HFA Inhaler 2 Puff(s) Inhalation every 6 hours PRN  ARIPiprazole 30 milliGRAM(s) Oral daily  chlorhexidine 2% Cloths 1 Application(s) Topical daily  chlorproMAZINE    IVPB 50 milliGRAM(s) IV Intermittent every 4 hours PRN  chlorproMAZINE    Tablet 50 milliGRAM(s) Oral <User Schedule>  diphenhydrAMINE 50 milliGRAM(s) Oral every 8 hours PRN  diphenhydrAMINE Elixir 25 milliGRAM(s) Oral every 8 hours PRN  diVALproex  milliGRAM(s) Oral daily  diVALproex ER 1000 milliGRAM(s) Oral at bedtime  levothyroxine 75 MICROGram(s) Oral daily  lurasidone 40 milliGRAM(s) Oral daily  melatonin 3 milliGRAM(s) Oral at bedtime  multivitamin/minerals/iron Oral Solution (CENTRUM) 15 milliLiter(s) Oral daily  oxyCODONE    IR 5 milliGRAM(s) Oral every 4 hours PRN  oxyCODONE    IR 10 milliGRAM(s) Oral every 4 hours PRN  pantoprazole   Suspension 40 milliGRAM(s) Oral daily  polyethylene glycol 3350 17 Gram(s) Oral every 12 hours  QUEtiapine 200 milliGRAM(s) Oral two times a day  QUEtiapine 50 milliGRAM(s) Oral <User Schedule>  senna 2 Tablet(s) Oral at bedtime  vancomycin  IVPB 1250 milliGRAM(s) IV Intermittent every 12 hours      Labs:                         10.2   4.04  )-----------( 150      ( 29 Jun 2022 05:00 )             31.8       06-29    139  |  104  |  9   ----------------------------<  110<H>  4.4   |  25  |  0.87    Ca    8.9      29 Jun 2022 05:00  Phos  4.5     06-29  Mg     1.90     06-29    TPro  6.8  /  Alb  3.7  /  TBili  <0.2  /  DBili  x   /  AST  26  /  ALT  26  /  AlkPhos  47  06-29      HCG: HCG Quantitative, Serum: <5.0 mIU/mL (06-29 @ 05:00)    Radiology  < from: US Extremity Nonvasc Limited, Left (06.23.22 @ 10:03) >    ACC: [id number] EXAM:  US NONVASC EXT LTD LT                          PROCEDURE DATE:  06/23/2022          INTERPRETATION:  CLINICAL INFORMATION:  Left upper arm redness and   swelling. Concern for abscess.    TECHNIQUE: US NONVASC EXTREMITY LIMITED LEFT.    COMPARISON: None available.    FINDINGS:  Targeted ultrasound in the left upper arm underlying the indicated region   of concern demonstrates a complex fluid collection without evidence of   surrounding increased vascularity measuring 3.1 x 1.6 x 4.0 cm.    IMPRESSION:    Complex fluid collection in the left upper arm measuring 3.1 x 1.6 x 4.0   cm.    --- End of Report ---            CECILIO BROWN MD; Attending Radiologist  This document has been electronically signed. Jun 23 2022 10:12AM    < end of copied text >    Assessment/Plan:   This is a 37y Female  presents with left shoulder collection  Patient presents to IR for aspiration.  Procedure/ risks/ benefits/ goals/ alternatives were explained. All questions answered. Informed content obtained from patient. Consent placed in chart.

## 2022-06-29 NOTE — BH CONSULTATION LIAISON PROGRESS NOTE - NSBHCHARTREVIEWVS_PSY_A_CORE FT
Vital Signs Last 24 Hrs  T(C): 36.4 (29 Jun 2022 21:57), Max: 37.1 (29 Jun 2022 05:38)  T(F): 97.6 (29 Jun 2022 21:57), Max: 98.8 (29 Jun 2022 05:38)  HR: 83 (29 Jun 2022 21:57) (83 - 100)  BP: 109/73 (29 Jun 2022 21:57) (104/69 - 117/74)  BP(mean): --  RR: 16 (29 Jun 2022 21:57) (16 - 17)  SpO2: 97% (29 Jun 2022 21:57) (97% - 100%)
Vital Signs Last 24 Hrs  T(C): 36.3 (20 Jun 2022 12:00), Max: 37.2 (19 Jun 2022 21:08)  T(F): 97.3 (20 Jun 2022 12:00), Max: 98.9 (19 Jun 2022 21:08)  HR: 98 (20 Jun 2022 12:00) (77 - 98)  BP: 114/74 (20 Jun 2022 12:00) (104/68 - 115/84)  BP(mean): --  RR: 18 (20 Jun 2022 12:00) (16 - 18)  SpO2: 100% (20 Jun 2022 12:00) (95% - 100%)
Vital Signs Last 24 Hrs  T(C): 36.5 (21 Jun 2022 21:43), Max: 36.9 (21 Jun 2022 12:24)  T(F): 97.7 (21 Jun 2022 21:43), Max: 98.4 (21 Jun 2022 12:24)  HR: 89 (21 Jun 2022 21:43) (84 - 134)  BP: 101/67 (21 Jun 2022 21:43) (100/60 - 119/94)  BP(mean): --  RR: 18 (21 Jun 2022 21:43) (16 - 18)  SpO2: 99% (21 Jun 2022 21:43) (97% - 100%)

## 2022-06-29 NOTE — PROGRESS NOTE ADULT - SUBJECTIVE AND OBJECTIVE BOX
***************************************************************  Fredrick Doyle, PGY1  Internal Medicine   [facility] Pager [phone]  [facility] Pager [phone]  ***************************************************************    YODIT MORROW  37y  MRN: [id number]  06-18-22 (11d)    Patient is a 37y old  Female who presents with a chief complaint of L shoulder pain (28 Jun 2022 14:06)      SUBJECTIVE / OVERNIGHT EVENTS: No acute overnight events. Pt seen and examined at bedside. Denies fevers, chills, CP, SOB, Abdominal pain, N/V, Constipation, Diarrhea. Last BM     12 Point ROS negative with the exception of the above    MEDICATIONS  (STANDING):  ARIPiprazole 30 milliGRAM(s) Oral daily  chlorhexidine 2% Cloths 1 Application(s) Topical daily  chlorproMAZINE    Tablet 50 milliGRAM(s) Oral <User Schedule>  diVALproex  milliGRAM(s) Oral daily  diVALproex ER 1000 milliGRAM(s) Oral at bedtime  levothyroxine 75 MICROGram(s) Oral daily  lurasidone 40 milliGRAM(s) Oral daily  melatonin 3 milliGRAM(s) Oral at bedtime  multivitamin/minerals/iron Oral Solution (CENTRUM) 15 milliLiter(s) Oral daily  pantoprazole   Suspension 40 milliGRAM(s) Oral daily  polyethylene glycol 3350 17 Gram(s) Oral every 12 hours  QUEtiapine 200 milliGRAM(s) Oral two times a day  QUEtiapine 50 milliGRAM(s) Oral <User Schedule>  senna 2 Tablet(s) Oral at bedtime  vancomycin  IVPB 1250 milliGRAM(s) IV Intermittent every 12 hours    MEDICATIONS  (PRN):  acetaminophen     Tablet .. 650 milliGRAM(s) Oral every 6 hours PRN Temp greater or equal to 38C (100.4F), Mild Pain (1 - 3)  ALBUTerol    90 MICROgram(s) HFA Inhaler 2 Puff(s) Inhalation every 6 hours PRN Shortness of Breath and/or Wheezing  chlorproMAZINE    IVPB 50 milliGRAM(s) IV Intermittent every 4 hours PRN agitation  diphenhydrAMINE 50 milliGRAM(s) Oral every 8 hours PRN Rash and/or Itching  diphenhydrAMINE Elixir 25 milliGRAM(s) Oral every 8 hours PRN Itching  oxyCODONE    IR 5 milliGRAM(s) Oral every 4 hours PRN Moderate Pain (4 - 6)  oxyCODONE    IR 10 milliGRAM(s) Oral every 4 hours PRN Severe Pain (7 - 10)      OBJECTIVE:  Vital Signs Last 24 Hrs  T(C): 37.1 (29 Jun 2022 05:38), Max: 37.1 (28 Jun 2022 21:18)  T(F): 98.8 (29 Jun 2022 05:38), Max: 98.8 (29 Jun 2022 05:38)  HR: 99 (29 Jun 2022 05:38) (90 - 99)  BP: 104/69 (29 Jun 2022 05:38) (98/64 - 134/82)  BP(mean): --  RR: 17 (29 Jun 2022 05:38) (17 - 18)  SpO2: 97% (29 Jun 2022 05:38) (96% - 100%)    I&O's Summary      PHYSICAL EXAM:  GENERAL: Laying comfortably, NAD  HEENT: NCAT, PERRLA, EOMI, no scleral icterus, no LAD  NECK: No JVD  LUNG: CTABL; No wheezes, crackles, or rhonchi  HEART: RRR; normal S1/S2; No murmurs, rubs, or gallops  ABDOMEN: +BS, soft, nontender, nondistended, no HSM; No rebound, guarding, or rigidity  EXTREMITIES:  No LE edema b/l, 2+ Peripheral Pulses, No clubbing, cyanosis, or edema  NEUROLOGY: AOx3, non-focal, strength 5/5 in all extremities, sensation intact  PSYCH: calm and cooperative  SKIN: No rashes or lesions    LABS:                        10.2   4.04  )-----------( 150      ( 29 Jun 2022 05:00 )             31.8     Auto Eosinophil # x     / Auto Eosinophil % x     / Auto Neutrophil # x     / Auto Neutrophil % x     / BANDS % x                            12.2   3.94  )-----------( 156      ( 28 Jun 2022 07:01 )             39.2     Auto Eosinophil # x     / Auto Eosinophil % x     / Auto Neutrophil # x     / Auto Neutrophil % x     / BANDS % x                            10.1   3.99  )-----------( 151      ( 27 Jun 2022 10:43 )             32.5     Auto Eosinophil # x     / Auto Eosinophil % x     / Auto Neutrophil # x     / Auto Neutrophil % x     / BANDS % x        06-29    139  |  104  |  9   ----------------------------<  110<H>  4.4   |  25  |  0.87  06-28    140  |  104  |  8   ----------------------------<  96  4.5   |  23  |  0.82  06-27    141  |  106  |  8   ----------------------------<  117<H>  4.5   |  25  |  0.77    Ca    8.9      29 Jun 2022 05:00  Ca    9.3      28 Jun 2022 07:01  Ca    9.1      27 Jun 2022 10:43  Phos  4.5     06-29  Mg     1.90     06-29    TPro  6.8  /  Alb  3.7  /  TBili  <0.2  /  DBili  x   /  AST  26  /  ALT  26  /  AlkPhos  47  06-29  TPro  7.3  /  Alb  4.1  /  TBili  0.2  /  DBili  x   /  AST  20  /  ALT  20  /  AlkPhos  50  06-28    PT/INR - ( 29 Jun 2022 05:00 )   PT: 12.7 sec;   INR: 1.09 ratio         PTT - ( 29 Jun 2022 05:00 )  PTT:21.3 sec            CAPILLARY BLOOD GLUCOSE            RADIOLOGY & ADDITIONAL TESTS:      Imaging Personally Reviewed: [ ] YES  [ ] NO    Consultants involved in case:   Consultant(s) Notes Reviewed:  [ ] YES  [ ] NO:   Care Discussed with Consultants/Other Providers [ ] YES  [ ] NO ***************************************************************  Fredrick Doyle, PGY1  Internal Medicine   [facility] Pager [phone]  [facility] Pager [phone]  ***************************************************************    YODIT MORROW  37y  MRN: [id number]  06-18-22 (11d)    Patient is a 37y old  Female who presents with a chief complaint of L shoulder pain (28 Jun 2022 14:06)      SUBJECTIVE / OVERNIGHT EVENTS: No acute overnight events. Pt seen and examined at bedside. Denies fevers, chills, CP, SOB, Abdominal pain, N/V, Constipation, Diarrhea. Last BM yesterday. Reports L shoulder pain 10/10 without tingling or numbness. Reports no leg pain b/l and no RUE pain.     12 Point ROS negative with the exception of the above    MEDICATIONS  (STANDING):  ARIPiprazole 30 milliGRAM(s) Oral daily  chlorhexidine 2% Cloths 1 Application(s) Topical daily  chlorproMAZINE    Tablet 50 milliGRAM(s) Oral <User Schedule>  diVALproex  milliGRAM(s) Oral daily  diVALproex ER 1000 milliGRAM(s) Oral at bedtime  levothyroxine 75 MICROGram(s) Oral daily  lurasidone 40 milliGRAM(s) Oral daily  melatonin 3 milliGRAM(s) Oral at bedtime  multivitamin/minerals/iron Oral Solution (CENTRUM) 15 milliLiter(s) Oral daily  pantoprazole   Suspension 40 milliGRAM(s) Oral daily  polyethylene glycol 3350 17 Gram(s) Oral every 12 hours  QUEtiapine 200 milliGRAM(s) Oral two times a day  QUEtiapine 50 milliGRAM(s) Oral <User Schedule>  senna 2 Tablet(s) Oral at bedtime  vancomycin  IVPB 1250 milliGRAM(s) IV Intermittent every 12 hours    MEDICATIONS  (PRN):  acetaminophen     Tablet .. 650 milliGRAM(s) Oral every 6 hours PRN Temp greater or equal to 38C (100.4F), Mild Pain (1 - 3)  ALBUTerol    90 MICROgram(s) HFA Inhaler 2 Puff(s) Inhalation every 6 hours PRN Shortness of Breath and/or Wheezing  chlorproMAZINE    IVPB 50 milliGRAM(s) IV Intermittent every 4 hours PRN agitation  diphenhydrAMINE 50 milliGRAM(s) Oral every 8 hours PRN Rash and/or Itching  diphenhydrAMINE Elixir 25 milliGRAM(s) Oral every 8 hours PRN Itching  oxyCODONE    IR 5 milliGRAM(s) Oral every 4 hours PRN Moderate Pain (4 - 6)  oxyCODONE    IR 10 milliGRAM(s) Oral every 4 hours PRN Severe Pain (7 - 10)      OBJECTIVE:  Vital Signs Last 24 Hrs  T(C): 37.1 (29 Jun 2022 05:38), Max: 37.1 (28 Jun 2022 21:18)  T(F): 98.8 (29 Jun 2022 05:38), Max: 98.8 (29 Jun 2022 05:38)  HR: 99 (29 Jun 2022 05:38) (90 - 99)  BP: 104/69 (29 Jun 2022 05:38) (98/64 - 134/82)  BP(mean): --  RR: 17 (29 Jun 2022 05:38) (17 - 18)  SpO2: 97% (29 Jun 2022 05:38) (96% - 100%)    PHYSICAL EXAM:  GENERAL: Laying comfortably, NAD  HEENT: NCAT, EOMI, no scleral icterus, no LAD  LUNG: CTABL; No wheezes, crackles, or rhonchi  HEART: RRR; normal S1/S2; No murmurs, rubs, or gallops  ABDOMEN: +BS, soft, nondistended, nontender  EXTREMITIES:  2+ peripheral pulses throughout; RUE: warm, pulses intact, movement and sensation intact, strength for flexion/extension, R hand finger squeeze persistently diminished vs L. LUE: warmth and edema from shoulder through arm, L shoulder area of induration. b/l LE's wrapped in ace bandages, decreased edema from yesterday  NEUROLOGY: AOx3, non-focal, strength 5/5 in all extremities, sensation intact  PSYCH: calm and cooperative  SKIN: No rashes or lesions    LABS:                        10.2   4.04  )-----------( 150      ( 29 Jun 2022 05:00 )             31.8     Auto Eosinophil # x     / Auto Eosinophil % x     / Auto Neutrophil # x     / Auto Neutrophil % x     / BANDS % x                            12.2   3.94  )-----------( 156      ( 28 Jun 2022 07:01 )             39.2     Auto Eosinophil # x     / Auto Eosinophil % x     / Auto Neutrophil # x     / Auto Neutrophil % x     / BANDS % x                            10.1   3.99  )-----------( 151      ( 27 Jun 2022 10:43 )             32.5     Auto Eosinophil # x     / Auto Eosinophil % x     / Auto Neutrophil # x     / Auto Neutrophil % x     / BANDS % x        06-29    139  |  104  |  9   ----------------------------<  110<H>  4.4   |  25  |  0.87  06-28    140  |  104  |  8   ----------------------------<  96  4.5   |  23  |  0.82  06-27    141  |  106  |  8   ----------------------------<  117<H>  4.5   |  25  |  0.77    Ca    8.9      29 Jun 2022 05:00  Ca    9.3      28 Jun 2022 07:01  Ca    9.1      27 Jun 2022 10:43  Phos  4.5     06-29  Mg     1.90     06-29    TPro  6.8  /  Alb  3.7  /  TBili  <0.2  /  DBili  x   /  AST  26  /  ALT  26  /  AlkPhos  47  06-29  TPro  7.3  /  Alb  4.1  /  TBili  0.2  /  DBili  x   /  AST  20  /  ALT  20  /  AlkPhos  50  06-28    PT/INR - ( 29 Jun 2022 05:00 )   PT: 12.7 sec;   INR: 1.09 ratio         PTT - ( 29 Jun 2022 05:00 )  PTT:21.3 sec    Vancomycin Level, Trough (06.29.22 @ 05:00): 11.1    RADIOLOGY & ADDITIONAL TESTS:  IR procedure - Left shoulder / deltoid aspiration performed using US guidance. Appx 4cc dark red fluid aspirated. Sample collected for micro.  Pt tolerated procedure without difficulty.    Consultants involved in case: IR, ID  Consultant(s) Notes Reviewed:  [X] YES  [ ] NO:   Care Discussed with Consultants/Other Providers [X] YES  [ ] NO
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
## 2022-06-29 NOTE — PROVIDER CONTACT NOTE (OTHER) - ASSESSMENT
pt is alert and not in any distress.
pt is alert and not in any distress.
Patient hr 123, 99/59 bp, patient complains of dizziness, denies SOB and chest pain
pt asymptomatic with no signs of distress
patient is exhibiting no signs of distress.

## 2022-06-29 NOTE — BH CONSULTATION LIAISON PROGRESS NOTE - CURRENT MEDICATION
MEDICATIONS  (STANDING):  ARIPiprazole 30 milliGRAM(s) Oral daily  chlorhexidine 2% Cloths 1 Application(s) Topical daily  chlorproMAZINE    Tablet 50 milliGRAM(s) Oral <User Schedule>  diVALproex  milliGRAM(s) Oral daily  diVALproex ER 1000 milliGRAM(s) Oral at bedtime  levothyroxine 75 MICROGram(s) Oral daily  lurasidone 40 milliGRAM(s) Oral daily  melatonin 3 milliGRAM(s) Oral at bedtime  multivitamin/minerals/iron Oral Solution (CENTRUM) 15 milliLiter(s) Oral daily  pantoprazole   Suspension 40 milliGRAM(s) Oral daily  polyethylene glycol 3350 17 Gram(s) Oral every 12 hours  QUEtiapine 200 milliGRAM(s) Oral two times a day  QUEtiapine 50 milliGRAM(s) Oral <User Schedule>  senna 2 Tablet(s) Oral at bedtime  vancomycin  IVPB 1250 milliGRAM(s) IV Intermittent every 12 hours    MEDICATIONS  (PRN):  acetaminophen     Tablet .. 650 milliGRAM(s) Oral every 6 hours PRN Temp greater or equal to 38C (100.4F), Mild Pain (1 - 3)  ALBUTerol    90 MICROgram(s) HFA Inhaler 2 Puff(s) Inhalation every 6 hours PRN Shortness of Breath and/or Wheezing  chlorproMAZINE    IVPB 50 milliGRAM(s) IV Intermittent every 4 hours PRN agitation  diphenhydrAMINE 50 milliGRAM(s) Oral every 8 hours PRN Rash and/or Itching  diphenhydrAMINE Elixir 25 milliGRAM(s) Oral every 8 hours PRN Itching  oxyCODONE    IR 5 milliGRAM(s) Oral every 4 hours PRN Moderate Pain (4 - 6)  oxyCODONE    IR 10 milliGRAM(s) Oral every 4 hours PRN Severe Pain (7 - 10)  
MEDICATIONS  (STANDING):  ARIPiprazole 30 milliGRAM(s) Oral daily  chlorhexidine 2% Cloths 1 Application(s) Topical daily  chlorproMAZINE    IVPB 50 milliGRAM(s) IV Intermittent <User Schedule>  clindamycin IVPB 600 milliGRAM(s) IV Intermittent every 8 hours  diVALproex  milliGRAM(s) Oral daily  diVALproex ER 1000 milliGRAM(s) Oral at bedtime  levothyroxine 75 MICROGram(s) Oral daily  LORazepam     Tablet 0.5 milliGRAM(s) Oral <User Schedule>  lurasidone 40 milliGRAM(s) Oral daily  melatonin 3 milliGRAM(s) Oral at bedtime  multivitamin 1 Tablet(s) Oral daily  pantoprazole    Tablet 40 milliGRAM(s) Oral before breakfast  polyethylene glycol 3350 Oral Powder - Peds 17 Gram(s) Oral daily  QUEtiapine 200 milliGRAM(s) Oral two times a day  QUEtiapine 50 milliGRAM(s) Oral <User Schedule>    MEDICATIONS  (PRN):  acetaminophen     Tablet .. 650 milliGRAM(s) Oral every 6 hours PRN Temp greater or equal to 38C (100.4F), Mild Pain (1 - 3)  ALBUTerol    90 MICROgram(s) HFA Inhaler 2 Puff(s) Inhalation every 6 hours PRN Shortness of Breath and/or Wheezing  chlorproMAZINE    Injectable 50 milliGRAM(s) IntraMuscular every 4 hours PRN Agitation  diphenhydrAMINE 50 milliGRAM(s) Oral every 8 hours PRN Rash and/or Itching  LORazepam   Injectable 2 milliGRAM(s) IntraMuscular every 4 hours PRN Severe Agitation  oxyCODONE    IR 10 milliGRAM(s) Oral every 4 hours PRN Severe Pain (7 - 10)  oxyCODONE    IR 5 milliGRAM(s) Oral every 4 hours PRN Moderate Pain (4 - 6)  
MEDICATIONS  (STANDING):  ARIPiprazole 30 milliGRAM(s) Oral daily  chlorproMAZINE    IVPB 50 milliGRAM(s) IV Intermittent <User Schedule>  clindamycin IVPB 600 milliGRAM(s) IV Intermittent every 8 hours  diVALproex  milliGRAM(s) Oral daily  diVALproex ER 1000 milliGRAM(s) Oral at bedtime  levothyroxine 75 MICROGram(s) Oral daily  LORazepam     Tablet 0.5 milliGRAM(s) Oral <User Schedule>  lurasidone 40 milliGRAM(s) Oral daily  melatonin 3 milliGRAM(s) Oral at bedtime  multivitamin 1 Tablet(s) Oral daily  pantoprazole    Tablet 40 milliGRAM(s) Oral before breakfast  polyethylene glycol 3350 Oral Powder - Peds 17 Gram(s) Oral daily  QUEtiapine 200 milliGRAM(s) Oral two times a day  QUEtiapine 50 milliGRAM(s) Oral <User Schedule>    MEDICATIONS  (PRN):  acetaminophen     Tablet .. 650 milliGRAM(s) Oral every 6 hours PRN Temp greater or equal to 38C (100.4F), Mild Pain (1 - 3)  ALBUTerol    90 MICROgram(s) HFA Inhaler 2 Puff(s) Inhalation every 6 hours PRN Shortness of Breath and/or Wheezing  chlorproMAZINE    Injectable 50 milliGRAM(s) IntraMuscular every 4 hours PRN Agitation  diphenhydrAMINE 50 milliGRAM(s) Oral every 8 hours PRN Rash and/or Itching  LORazepam   Injectable 2 milliGRAM(s) IntraMuscular every 4 hours PRN Severe Agitation  oxyCODONE    IR 10 milliGRAM(s) Oral every 4 hours PRN Severe Pain (7 - 10)  oxyCODONE    IR 5 milliGRAM(s) Oral every 4 hours PRN Moderate Pain (4 - 6)

## 2022-06-29 NOTE — PROGRESS NOTE ADULT - PROBLEM SELECTOR PLAN 4
Pt had episodic dysuria 6/26  - UA 6/26 wnl  - Ucx 6/26 neg  - no longer reports dysuria, continue to monitor

## 2022-06-30 LAB
APPEARANCE UR: ABNORMAL
BACTERIA # UR AUTO: ABNORMAL
BILIRUB UR-MCNC: NEGATIVE — SIGNIFICANT CHANGE UP
COLOR SPEC: YELLOW — SIGNIFICANT CHANGE UP
COMMENT - URINE: SIGNIFICANT CHANGE UP
DIFF PNL FLD: NEGATIVE — SIGNIFICANT CHANGE UP
EPI CELLS # UR: 9 /HPF — HIGH (ref 0–5)
GLUCOSE UR QL: NEGATIVE — SIGNIFICANT CHANGE UP
GRAM STN FLD: SIGNIFICANT CHANGE UP
HYALINE CASTS # UR AUTO: 4 /LPF — SIGNIFICANT CHANGE UP (ref 0–7)
KETONES UR-MCNC: ABNORMAL
LEUKOCYTE ESTERASE UR-ACNC: NEGATIVE — SIGNIFICANT CHANGE UP
NIGHT BLUE STAIN TISS: SIGNIFICANT CHANGE UP
NITRITE UR-MCNC: NEGATIVE — SIGNIFICANT CHANGE UP
PH UR: 6.5 — SIGNIFICANT CHANGE UP (ref 5–8)
PROT UR-MCNC: ABNORMAL
RBC CASTS # UR COMP ASSIST: 3 /HPF — SIGNIFICANT CHANGE UP (ref 0–4)
SP GR SPEC: 1.03 — SIGNIFICANT CHANGE UP (ref 1–1.05)
SPECIMEN SOURCE: SIGNIFICANT CHANGE UP
SPECIMEN SOURCE: SIGNIFICANT CHANGE UP
UROBILINOGEN FLD QL: ABNORMAL
WBC UR QL: 3 /HPF — SIGNIFICANT CHANGE UP (ref 0–5)

## 2022-06-30 PROCEDURE — 99232 SBSQ HOSP IP/OBS MODERATE 35: CPT

## 2022-06-30 RX ORDER — PANTOPRAZOLE SODIUM 20 MG/1
40 TABLET, DELAYED RELEASE ORAL
Refills: 0 | Status: DISCONTINUED | OUTPATIENT
Start: 2022-06-30 | End: 2022-07-02

## 2022-06-30 RX ORDER — APIXABAN 2.5 MG/1
5 TABLET, FILM COATED ORAL EVERY 12 HOURS
Refills: 0 | Status: DISCONTINUED | OUTPATIENT
Start: 2022-06-30 | End: 2022-07-02

## 2022-06-30 RX ADMIN — APIXABAN 5 MILLIGRAM(S): 2.5 TABLET, FILM COATED ORAL at 23:03

## 2022-06-30 RX ADMIN — SENNA PLUS 2 TABLET(S): 8.6 TABLET ORAL at 21:43

## 2022-06-30 RX ADMIN — Medication 50 MILLIGRAM(S): at 20:20

## 2022-06-30 RX ADMIN — ARIPIPRAZOLE 30 MILLIGRAM(S): 15 TABLET ORAL at 12:03

## 2022-06-30 RX ADMIN — OXYCODONE HYDROCHLORIDE 10 MILLIGRAM(S): 5 TABLET ORAL at 06:10

## 2022-06-30 RX ADMIN — Medication 1 TABLET(S): at 17:29

## 2022-06-30 RX ADMIN — Medication 50 MILLIGRAM(S): at 21:42

## 2022-06-30 RX ADMIN — OXYCODONE HYDROCHLORIDE 10 MILLIGRAM(S): 5 TABLET ORAL at 21:40

## 2022-06-30 RX ADMIN — Medication 50 MILLIGRAM(S): at 23:35

## 2022-06-30 RX ADMIN — Medication 3 MILLIGRAM(S): at 21:43

## 2022-06-30 RX ADMIN — DIVALPROEX SODIUM 1000 MILLIGRAM(S): 500 TABLET, DELAYED RELEASE ORAL at 21:43

## 2022-06-30 RX ADMIN — OXYCODONE HYDROCHLORIDE 10 MILLIGRAM(S): 5 TABLET ORAL at 22:30

## 2022-06-30 RX ADMIN — DIVALPROEX SODIUM 500 MILLIGRAM(S): 500 TABLET, DELAYED RELEASE ORAL at 12:03

## 2022-06-30 RX ADMIN — QUETIAPINE FUMARATE 200 MILLIGRAM(S): 200 TABLET, FILM COATED ORAL at 05:29

## 2022-06-30 RX ADMIN — OXYCODONE HYDROCHLORIDE 10 MILLIGRAM(S): 5 TABLET ORAL at 11:09

## 2022-06-30 RX ADMIN — Medication 100 MILLIGRAM(S): at 18:27

## 2022-06-30 RX ADMIN — OXYCODONE HYDROCHLORIDE 10 MILLIGRAM(S): 5 TABLET ORAL at 05:33

## 2022-06-30 RX ADMIN — CHLORHEXIDINE GLUCONATE 1 APPLICATION(S): 213 SOLUTION TOPICAL at 12:04

## 2022-06-30 RX ADMIN — OXYCODONE HYDROCHLORIDE 10 MILLIGRAM(S): 5 TABLET ORAL at 18:27

## 2022-06-30 RX ADMIN — OXYCODONE HYDROCHLORIDE 10 MILLIGRAM(S): 5 TABLET ORAL at 10:09

## 2022-06-30 RX ADMIN — Medication 75 MICROGRAM(S): at 05:29

## 2022-06-30 RX ADMIN — Medication 50 MILLIGRAM(S): at 08:38

## 2022-06-30 RX ADMIN — Medication 250 MILLIGRAM(S): at 06:08

## 2022-06-30 RX ADMIN — LURASIDONE HYDROCHLORIDE 40 MILLIGRAM(S): 40 TABLET ORAL at 12:04

## 2022-06-30 RX ADMIN — OXYCODONE HYDROCHLORIDE 10 MILLIGRAM(S): 5 TABLET ORAL at 17:27

## 2022-06-30 RX ADMIN — PANTOPRAZOLE SODIUM 40 MILLIGRAM(S): 20 TABLET, DELAYED RELEASE ORAL at 12:04

## 2022-06-30 RX ADMIN — Medication 50 MILLIGRAM(S): at 12:03

## 2022-06-30 RX ADMIN — APIXABAN 5 MILLIGRAM(S): 2.5 TABLET, FILM COATED ORAL at 12:04

## 2022-06-30 RX ADMIN — QUETIAPINE FUMARATE 50 MILLIGRAM(S): 200 TABLET, FILM COATED ORAL at 12:03

## 2022-06-30 RX ADMIN — QUETIAPINE FUMARATE 200 MILLIGRAM(S): 200 TABLET, FILM COATED ORAL at 17:27

## 2022-06-30 NOTE — PROGRESS NOTE ADULT - PROBLEM SELECTOR PLAN 1
Patient presented with abscess of the L shoulder secondary to IM injection. No leukocytosis however was tachycardic and mildly hypotensive.  - previously had R shoulder abscess in the past  - CT L shoulder shows LUE abscess closely associated with the deltoid musculature. A couple foci of subcutaneous gas along the inferior margin of the collection are most likely related to recent upper extremity injection.   - surgery consulted, no indication for intervention, negative for nec fasc   - Ultrasound demonstrating R arm abscess   - (6/24) - Decision was made by IR team to hold off on I/D procedure  - ID consulted, rec switching clindamycin to vanc  - 6/25 - Pt now w infiltrated IV, swelling of RUE and hand w/ suspected thrombophlebitis.  - 6/25 Switched from IV vanco to oral clindamycin 2/2 infiltrated IV  - 6/27 IV access obtained, switched back to IV vanco 1.25g BID  - c/w IV vanco  -  I&D 6/29 by IR - f/u cultures  - c/w oxycodone PRN for pain Patient presented with abscess of the L shoulder secondary to IM injection. No leukocytosis however was tachycardic and mildly hypotensive.  - previously had R shoulder abscess in the past  - CT L shoulder shows LUE abscess closely associated with the deltoid musculature  - surgery consulted, no indication for intervention, negative for nec fasc   - Ultrasound demonstrating R arm abscess   - 6/25 - Pt now w infiltrated IV, swelling of RUE and hand w/ suspected thrombophlebitis.  - 6/25 Switched from IV vanco to oral clindamycin 2/2 infiltrated IV  - 6/27 IV access obtained, switched back to IV vanco 1.25g BID  - 6/30 IV infiltrated, switched from IV vanco to oral doxycycline 100 BID  - c/w oral doxycycline  - I&D 6/29 by IR - gram stain w/out organisms  - c/w oxycodone PRN for pain  - ID following, IR following, recs appreciated

## 2022-06-30 NOTE — PROGRESS NOTE ADULT - PROBLEM SELECTOR PLAN 8
- Hx of RLE DVT, on Eliquis 5mg BID outpatient   - holding lovenox for IR procedure - Hx of RLE DVT, on Eliquis 5mg BID outpatient   - restarted home eliquis

## 2022-06-30 NOTE — CHART NOTE - NSCHARTNOTEFT_GEN_A_CORE
Saw patient for complaint of chest pain. States the pain was localized to the center of her chest and radiated superiorly to her neck. The pain occurred during dinner and she has had a similar feeling for several months now during meals. No acute shortness of breath. Vital signs stable. Most consistent with GERD - will continue to monitor and consider PPI trial.

## 2022-06-30 NOTE — PROGRESS NOTE ADULT - SUBJECTIVE AND OBJECTIVE BOX
***************************************************************  Fredrick Doyle, PGY1  Internal Medicine   [facility] Pager [phone]  [facility] Pager [phone]  ***************************************************************    YODIT MORROW  37y  MRN: [id number]  06-18-22 (12d)    Patient is a 37y old  Female who presents with a chief complaint of L shoulder pain (29 Jun 2022 06:17)      SUBJECTIVE / OVERNIGHT EVENTS: No acute overnight events. Pt seen and examined at bedside. Denies fevers, chills, CP, SOB, Abdominal pain, N/V, Constipation, Diarrhea. Last BM     12 Point ROS negative with the exception of the above    MEDICATIONS  (STANDING):  ARIPiprazole 30 milliGRAM(s) Oral daily  chlorhexidine 2% Cloths 1 Application(s) Topical daily  chlorproMAZINE    Tablet 50 milliGRAM(s) Oral <User Schedule>  diVALproex  milliGRAM(s) Oral daily  diVALproex ER 1000 milliGRAM(s) Oral at bedtime  levothyroxine 75 MICROGram(s) Oral daily  lurasidone 40 milliGRAM(s) Oral daily  melatonin 3 milliGRAM(s) Oral at bedtime  multivitamin/minerals/iron Oral Solution (CENTRUM) 15 milliLiter(s) Oral daily  pantoprazole   Suspension 40 milliGRAM(s) Oral daily  polyethylene glycol 3350 17 Gram(s) Oral every 12 hours  QUEtiapine 200 milliGRAM(s) Oral two times a day  QUEtiapine 50 milliGRAM(s) Oral <User Schedule>  senna 2 Tablet(s) Oral at bedtime  vancomycin  IVPB 1250 milliGRAM(s) IV Intermittent every 12 hours    MEDICATIONS  (PRN):  acetaminophen     Tablet .. 650 milliGRAM(s) Oral every 6 hours PRN Temp greater or equal to 38C (100.4F), Mild Pain (1 - 3)  ALBUTerol    90 MICROgram(s) HFA Inhaler 2 Puff(s) Inhalation every 6 hours PRN Shortness of Breath and/or Wheezing  chlorproMAZINE    IVPB 50 milliGRAM(s) IV Intermittent every 4 hours PRN agitation  diphenhydrAMINE 50 milliGRAM(s) Oral every 8 hours PRN Rash and/or Itching  diphenhydrAMINE Elixir 25 milliGRAM(s) Oral every 8 hours PRN Itching  oxyCODONE    IR 5 milliGRAM(s) Oral every 4 hours PRN Moderate Pain (4 - 6)  oxyCODONE    IR 10 milliGRAM(s) Oral every 4 hours PRN Severe Pain (7 - 10)      OBJECTIVE:  Vital Signs Last 24 Hrs  T(C): 36.7 (30 Jun 2022 05:15), Max: 36.7 (30 Jun 2022 05:15)  T(F): 98 (30 Jun 2022 05:15), Max: 98 (30 Jun 2022 05:15)  HR: 90 (30 Jun 2022 05:15) (83 - 100)  BP: 110/62 (30 Jun 2022 05:15) (109/73 - 117/74)  BP(mean): --  RR: 17 (30 Jun 2022 05:15) (16 - 17)  SpO2: 97% (30 Jun 2022 05:15) (97% - 100%)    I&O's Summary      PHYSICAL EXAM:  GENERAL: Laying comfortably, NAD  HEENT: NCAT, PERRLA, EOMI, no scleral icterus, no LAD  NECK: No JVD  LUNG: CTABL; No wheezes, crackles, or rhonchi  HEART: RRR; normal S1/S2; No murmurs, rubs, or gallops  ABDOMEN: +BS, soft, nontender, nondistended, no HSM; No rebound, guarding, or rigidity  EXTREMITIES:  No LE edema b/l, 2+ Peripheral Pulses, No clubbing, cyanosis, or edema  NEUROLOGY: AOx3, non-focal, strength 5/5 in all extremities, sensation intact  PSYCH: calm and cooperative  SKIN: No rashes or lesions    LABS:                        10.2   4.04  )-----------( 150      ( 29 Jun 2022 05:00 )             31.8     Auto Eosinophil # x     / Auto Eosinophil % x     / Auto Neutrophil # x     / Auto Neutrophil % x     / BANDS % x                            12.2   3.94  )-----------( 156      ( 28 Jun 2022 07:01 )             39.2     Auto Eosinophil # x     / Auto Eosinophil % x     / Auto Neutrophil # x     / Auto Neutrophil % x     / BANDS % x        06-29    139  |  104  |  9   ----------------------------<  110<H>  4.4   |  25  |  0.87  06-28    140  |  104  |  8   ----------------------------<  96  4.5   |  23  |  0.82  06-27    141  |  106  |  8   ----------------------------<  117<H>  4.5   |  25  |  0.77    Ca    8.9      29 Jun 2022 05:00  Ca    9.3      28 Jun 2022 07:01  Ca    9.1      27 Jun 2022 10:43  Phos  4.5     06-29  Mg     1.90     06-29    TPro  6.8  /  Alb  3.7  /  TBili  <0.2  /  DBili  x   /  AST  26  /  ALT  26  /  AlkPhos  47  06-29  TPro  7.3  /  Alb  4.1  /  TBili  0.2  /  DBili  x   /  AST  20  /  ALT  20  /  AlkPhos  50  06-28    PT/INR - ( 29 Jun 2022 05:00 )   PT: 12.7 sec;   INR: 1.09 ratio         PTT - ( 29 Jun 2022 05:00 )  PTT:21.3 sec            CAPILLARY BLOOD GLUCOSE          Culture - Joint Fluid (collected 06-29-22 @ 14:00)  Source: Joint Fl L SHOULDER  Gram Stain (06-30-22 @ 00:23):    Moderate polymorphonuclear leukocytes seen per low power field    No organisms seen per oil power field        RADIOLOGY & ADDITIONAL TESTS:      Imaging Personally Reviewed: [ ] YES  [ ] NO    Consultants involved in case:   Consultant(s) Notes Reviewed:  [ ] YES  [ ] NO:   Care Discussed with Consultants/Other Providers [ ] YES  [ ] NO ***************************************************************  Fredrick Doyle, PGY1  Internal Medicine   [facility] Pager [phone]  [facility] Pager [phone]  ***************************************************************    YODIT MORROW  37y  MRN: [id number]  22 (12d)    Patient is a 37y old  Female who presents with a chief complaint of L shoulder pain (2022 06:17)      SUBJECTIVE / OVERNIGHT EVENTS: No acute overnight events. Pt seen and examined at bedside. Denies fevers, chills, CP, SOB, Abdominal pain, N/V, Constipation, Diarrhea. Reports that her L shoulder continues to hurt and that her R arm is swollen following IV's. Additionally, reports feeling jittery/shaky today. Reports darkened urine this am.     12 Point ROS negative with the exception of the above    MEDICATIONS  (STANDING):  ARIPiprazole 30 milliGRAM(s) Oral daily  chlorhexidine 2% Cloths 1 Application(s) Topical daily  chlorproMAZINE    Tablet 50 milliGRAM(s) Oral <User Schedule>  diVALproex  milliGRAM(s) Oral daily  diVALproex ER 1000 milliGRAM(s) Oral at bedtime  levothyroxine 75 MICROGram(s) Oral daily  lurasidone 40 milliGRAM(s) Oral daily  melatonin 3 milliGRAM(s) Oral at bedtime  multivitamin/minerals/iron Oral Solution (CENTRUM) 15 milliLiter(s) Oral daily  pantoprazole   Suspension 40 milliGRAM(s) Oral daily  polyethylene glycol 3350 17 Gram(s) Oral every 12 hours  QUEtiapine 200 milliGRAM(s) Oral two times a day  QUEtiapine 50 milliGRAM(s) Oral <User Schedule>  senna 2 Tablet(s) Oral at bedtime  vancomycin  IVPB 1250 milliGRAM(s) IV Intermittent every 12 hours    MEDICATIONS  (PRN):  acetaminophen     Tablet .. 650 milliGRAM(s) Oral every 6 hours PRN Temp greater or equal to 38C (100.4F), Mild Pain (1 - 3)  ALBUTerol    90 MICROgram(s) HFA Inhaler 2 Puff(s) Inhalation every 6 hours PRN Shortness of Breath and/or Wheezing  chlorproMAZINE    IVPB 50 milliGRAM(s) IV Intermittent every 4 hours PRN agitation  diphenhydrAMINE 50 milliGRAM(s) Oral every 8 hours PRN Rash and/or Itching  diphenhydrAMINE Elixir 25 milliGRAM(s) Oral every 8 hours PRN Itching  oxyCODONE    IR 5 milliGRAM(s) Oral every 4 hours PRN Moderate Pain (4 - 6)  oxyCODONE    IR 10 milliGRAM(s) Oral every 4 hours PRN Severe Pain (7 - 10)      OBJECTIVE:  Vital Signs Last 24 Hrs  T(C): 36.7 (2022 05:15), Max: 36.7 (2022 05:15)  T(F): 98 (2022 05:15), Max: 98 (2022 05:15)  HR: 90 (2022 05:15) (83 - 100)  BP: 110/62 (2022 05:15) (109/73 - 117/74)  BP(mean): --  RR: 17 (2022 05:15) (16 - 17)  SpO2: 97% (2022 05:15) (97% - 100%)      PHYSICAL EXAM:  GENERAL: Laying comfortably, NAD  HEENT: NCAT, EOMI, no scleral icterus, no LAD  LUNG: CTABL; No wheezes, crackles, or rhonchi  HEART: RRR; normal S1/S2; No murmurs, rubs, or gallops  ABDOMEN: +BS, soft, nondistended; +L sided and lower-mid mild abdominal tenderness, no rebound or guarding  EXTREMITIES: LE edema b/l improving, 2+ Peripheral Pulses; L shoulder procedure site covered, warmth surrounding it  NEUROLOGY: AOx3, non-focal, strength 5/5 in all extremities, sensation intact; improved R hand finger squeeze  PSYCH: calm and cooperative  SKIN: No rashes or lesions    LABS:                 10.2   4.04  )-----------( 150      ( 2022 05:00 )             31.8     Auto Eosinophil # x     / Auto Eosinophil % x     / Auto Neutrophil # x     / Auto Neutrophil % x     / BANDS % x                            12.2   3.94  )-----------( 156      ( 2022 07:01 )             39.2     Auto Eosinophil # x     / Auto Eosinophil % x     / Auto Neutrophil # x     / Auto Neutrophil % x     / BANDS % x            139  |  104  |  9   ----------------------------<  110<H>  4.4   |  25  |  0.87      140  |  104  |  8   ----------------------------<  96  4.5   |  23  |  0.82      141  |  106  |  8   ----------------------------<  117<H>  4.5   |  25  |  0.77    Ca    8.9      2022 05:00  Ca    9.3      2022 07:01  Ca    9.1      2022 10:43  Phos  4.5       Mg     1.90         TPro  6.8  /  Alb  3.7  /  TBili  <0.2  /  DBili  x   /  AST  26  /  ALT  26  /  AlkPhos  47    TPro  7.3  /  Alb  4.1  /  TBili  0.2  /  DBili  x   /  AST  20  /  ALT  20  /  AlkPhos  50  28    PT/INR - ( 2022 05:00 )   PT: 12.7 sec;   INR: 1.09 ratio         PTT - ( 2022 05:00 )  PTT:21.3 sec      Culture - Joint Fluid (collected 22 @ 14:00)  Source: Joint Fl L SHOULDER  Gram Stain (22 @ 00:23):    Moderate polymorphonuclear leukocytes seen per low power field    No organisms seen per oil power field    Urinalysis Basic - ( 2022 13:20 )  Color: Yellow / Appearance: Slightly Turbid / S.033 / pH: x  Gluc: x / Ketone: Trace  / Bili: Negative / Urobili: 3 mg/dL   Blood: x / Protein: 30 mg/dL / Nitrite: Negative   Leuk Esterase: Negative / RBC: 3 /HPF / WBC 3 /HPF   Sq Epi: x / Non Sq Epi: 9 /HPF / Bacteria: Occasional      Consultants involved in case: IR, ID  Consultant(s) Notes Reviewed:  [X] YES  [ ] NO:   Care Discussed with Consultants/Other Providers [X] YES  [ ] NO

## 2022-06-30 NOTE — PROGRESS NOTE ADULT - PROBLEM SELECTOR PLAN 4
Pt had episodic dysuria 6/26  - UA 6/26 wnl  - Ucx 6/26 neg  - no longer reports dysuria, continue to monitor Pt had episodic dysuria 6/26  - UA 6/26 wnl  - Ucx 6/26 neg  - Reports darkened urine this am  - UA 6/30 - proteinuria, slight ketonuria, turbid, occasional bacteria

## 2022-06-30 NOTE — PROGRESS NOTE ADULT - PROBLEM SELECTOR PLAN 9
Diet: regular  DVT: lovenox subQ BID, holding for IR procedure    Case antony Carol: [phone]    Legal guardianship :   of Case Management Maria Elena Armijo: [phone]   Cliff Carlton Raza ([phone]), Diet: regular  DVT: restarted home reji hardy Carol: [phone]    Legal guardianship :   of Case Management Maria Elena Armijo: [phone]   Cliff Carlton Raza ([phone]),

## 2022-07-01 ENCOUNTER — TRANSCRIPTION ENCOUNTER (OUTPATIENT)
Age: 37
End: 2022-07-01

## 2022-07-01 PROCEDURE — 93010 ELECTROCARDIOGRAM REPORT: CPT

## 2022-07-01 PROCEDURE — 99232 SBSQ HOSP IP/OBS MODERATE 35: CPT

## 2022-07-01 RX ORDER — DIVALPROEX SODIUM 500 MG/1
1 TABLET, DELAYED RELEASE ORAL
Qty: 0 | Refills: 0 | DISCHARGE

## 2022-07-01 RX ORDER — AZTREONAM 2 G
1 VIAL (EA) INJECTION
Qty: 0 | Refills: 0 | DISCHARGE

## 2022-07-01 RX ORDER — DIVALPROEX SODIUM 500 MG/1
2 TABLET, DELAYED RELEASE ORAL
Qty: 0 | Refills: 0 | DISCHARGE
Start: 2022-07-01

## 2022-07-01 RX ORDER — OXYCODONE HYDROCHLORIDE 5 MG/1
5 TABLET ORAL ONCE
Refills: 0 | Status: DISCONTINUED | OUTPATIENT
Start: 2022-07-01 | End: 2022-07-01

## 2022-07-01 RX ORDER — QUETIAPINE FUMARATE 200 MG/1
1 TABLET, FILM COATED ORAL
Qty: 0 | Refills: 0 | DISCHARGE
Start: 2022-07-01

## 2022-07-01 RX ORDER — DIVALPROEX SODIUM 500 MG/1
1 TABLET, DELAYED RELEASE ORAL
Qty: 0 | Refills: 0 | DISCHARGE
Start: 2022-07-01

## 2022-07-01 RX ORDER — DIVALPROEX SODIUM 500 MG/1
2 TABLET, DELAYED RELEASE ORAL
Qty: 0 | Refills: 0 | DISCHARGE

## 2022-07-01 RX ORDER — ARIPIPRAZOLE 15 MG/1
1 TABLET ORAL
Qty: 0 | Refills: 0 | DISCHARGE
Start: 2022-07-01

## 2022-07-01 RX ORDER — HALOPERIDOL DECANOATE 100 MG/ML
5 INJECTION INTRAMUSCULAR ONCE
Refills: 0 | Status: COMPLETED | OUTPATIENT
Start: 2022-07-01 | End: 2022-07-01

## 2022-07-01 RX ORDER — HALOPERIDOL DECANOATE 100 MG/ML
1 INJECTION INTRAMUSCULAR ONCE
Refills: 0 | Status: DISCONTINUED | OUTPATIENT
Start: 2022-07-01 | End: 2022-07-01

## 2022-07-01 RX ADMIN — SENNA PLUS 2 TABLET(S): 8.6 TABLET ORAL at 22:54

## 2022-07-01 RX ADMIN — Medication 50 MILLIGRAM(S): at 12:03

## 2022-07-01 RX ADMIN — LURASIDONE HYDROCHLORIDE 40 MILLIGRAM(S): 40 TABLET ORAL at 17:51

## 2022-07-01 RX ADMIN — Medication 3 MILLIGRAM(S): at 22:52

## 2022-07-01 RX ADMIN — APIXABAN 5 MILLIGRAM(S): 2.5 TABLET, FILM COATED ORAL at 08:46

## 2022-07-01 RX ADMIN — DIVALPROEX SODIUM 500 MILLIGRAM(S): 500 TABLET, DELAYED RELEASE ORAL at 12:03

## 2022-07-01 RX ADMIN — Medication 50 MILLIGRAM(S): at 23:50

## 2022-07-01 RX ADMIN — ARIPIPRAZOLE 30 MILLIGRAM(S): 15 TABLET ORAL at 12:04

## 2022-07-01 RX ADMIN — Medication 1 TABLET(S): at 12:03

## 2022-07-01 RX ADMIN — APIXABAN 5 MILLIGRAM(S): 2.5 TABLET, FILM COATED ORAL at 21:26

## 2022-07-01 RX ADMIN — Medication 100 MILLIGRAM(S): at 17:51

## 2022-07-01 RX ADMIN — OXYCODONE HYDROCHLORIDE 10 MILLIGRAM(S): 5 TABLET ORAL at 08:46

## 2022-07-01 RX ADMIN — OXYCODONE HYDROCHLORIDE 10 MILLIGRAM(S): 5 TABLET ORAL at 09:46

## 2022-07-01 RX ADMIN — CHLORHEXIDINE GLUCONATE 1 APPLICATION(S): 213 SOLUTION TOPICAL at 12:05

## 2022-07-01 RX ADMIN — Medication 100 MILLIGRAM(S): at 05:53

## 2022-07-01 RX ADMIN — Medication 75 MICROGRAM(S): at 05:53

## 2022-07-01 RX ADMIN — Medication 50 MILLIGRAM(S): at 20:26

## 2022-07-01 RX ADMIN — Medication 50 MILLIGRAM(S): at 07:05

## 2022-07-01 RX ADMIN — Medication 2 MILLIGRAM(S): at 20:37

## 2022-07-01 RX ADMIN — OXYCODONE HYDROCHLORIDE 5 MILLIGRAM(S): 5 TABLET ORAL at 15:15

## 2022-07-01 RX ADMIN — PANTOPRAZOLE SODIUM 40 MILLIGRAM(S): 20 TABLET, DELAYED RELEASE ORAL at 07:05

## 2022-07-01 RX ADMIN — POLYETHYLENE GLYCOL 3350 17 GRAM(S): 17 POWDER, FOR SOLUTION ORAL at 05:52

## 2022-07-01 RX ADMIN — HALOPERIDOL DECANOATE 5 MILLIGRAM(S): 100 INJECTION INTRAMUSCULAR at 20:37

## 2022-07-01 RX ADMIN — OXYCODONE HYDROCHLORIDE 5 MILLIGRAM(S): 5 TABLET ORAL at 14:15

## 2022-07-01 RX ADMIN — QUETIAPINE FUMARATE 200 MILLIGRAM(S): 200 TABLET, FILM COATED ORAL at 17:51

## 2022-07-01 RX ADMIN — Medication 50 MILLIGRAM(S): at 23:05

## 2022-07-01 RX ADMIN — QUETIAPINE FUMARATE 50 MILLIGRAM(S): 200 TABLET, FILM COATED ORAL at 12:04

## 2022-07-01 RX ADMIN — QUETIAPINE FUMARATE 200 MILLIGRAM(S): 200 TABLET, FILM COATED ORAL at 05:53

## 2022-07-01 RX ADMIN — DIVALPROEX SODIUM 1000 MILLIGRAM(S): 500 TABLET, DELAYED RELEASE ORAL at 22:53

## 2022-07-01 NOTE — DISCHARGE NOTE PROVIDER - CARE PROVIDER_API CALL
Fabi Maynard  Phone: [phone]  Fax: (   )    -  Follow Up Time: 2 weeks   Fabi Maynard  Phone: [phone]  Fax: (   )    -  Follow Up Time: 2 weeks    Dawna Snow)  Infectious Disease; Internal Medicine  [address] [place] [address]  Phone: [phone]  Fax: [phone]  Follow Up Time: 2 weeks

## 2022-07-01 NOTE — DISCHARGE NOTE NURSING/CASE MANAGEMENT/SOCIAL WORK - PATIENT PORTAL LINK FT
You can access the FollowMyHealth Patient Portal offered by [facility] by registering at the following website: [email or web address]. By joining [facility]’s FollowMyHealth portal, you will also be able to view your health information using other applications (apps) compatible with our system.

## 2022-07-01 NOTE — PROGRESS NOTE ADULT - SUBJECTIVE AND OBJECTIVE BOX
***************************************************************  Fredrick Doyle, PGY1  Internal Medicine   [facility] Pager [phone]  [facility] Pager [phone]  ***************************************************************    YODIT MORROW  37y  MRN: [id number]  22 (13d)    Patient is a 37y old  Female who presents with a chief complaint of L shoulder pain (2022 06:18)      SUBJECTIVE / OVERNIGHT EVENTS: No acute overnight events. Pt seen and examined at bedside. Denies fevers, chills, CP, SOB, Abdominal pain, N/V, Constipation, Diarrhea. Last BM     12 Point ROS negative with the exception of the above    MEDICATIONS  (STANDING):  apixaban 5 milliGRAM(s) Oral every 12 hours  ARIPiprazole 30 milliGRAM(s) Oral daily  chlorhexidine 2% Cloths 1 Application(s) Topical daily  chlorproMAZINE    Tablet 50 milliGRAM(s) Oral <User Schedule>  diVALproex ER 1000 milliGRAM(s) Oral at bedtime  diVALproex  milliGRAM(s) Oral daily  doxycycline monohydrate Capsule 100 milliGRAM(s) Oral every 12 hours  levothyroxine 75 MICROGram(s) Oral daily  lurasidone 40 milliGRAM(s) Oral daily  melatonin 3 milliGRAM(s) Oral at bedtime  multivitamin 1 Tablet(s) Oral daily  pantoprazole    Tablet 40 milliGRAM(s) Oral before breakfast  polyethylene glycol 3350 17 Gram(s) Oral every 12 hours  QUEtiapine 200 milliGRAM(s) Oral two times a day  QUEtiapine 50 milliGRAM(s) Oral <User Schedule>  senna 2 Tablet(s) Oral at bedtime    MEDICATIONS  (PRN):  acetaminophen     Tablet .. 650 milliGRAM(s) Oral every 6 hours PRN Temp greater or equal to 38C (100.4F), Mild Pain (1 - 3)  ALBUTerol    90 MICROgram(s) HFA Inhaler 2 Puff(s) Inhalation every 6 hours PRN Shortness of Breath and/or Wheezing  chlorproMAZINE    IVPB 50 milliGRAM(s) IV Intermittent every 4 hours PRN agitation  diphenhydrAMINE 50 milliGRAM(s) Oral every 8 hours PRN Rash and/or Itching  diphenhydrAMINE Elixir 25 milliGRAM(s) Oral every 8 hours PRN Itching  oxyCODONE    IR 10 milliGRAM(s) Oral every 4 hours PRN Severe Pain (7 - 10)  oxyCODONE    IR 5 milliGRAM(s) Oral every 4 hours PRN Moderate Pain (4 - 6)      OBJECTIVE:  Vital Signs Last 24 Hrs  T(C): 36.8 (2022 05:37), Max: 36.8 (2022 22:08)  T(F): 98.3 (2022 05:37), Max: 98.3 (2022 05:37)  HR: 75 (2022 05:37) (70 - 98)  BP: 102/64 (2022 05:37) (101/60 - 108/72)  BP(mean): --  RR: 17 (2022 05:37) (16 - 17)  SpO2: 98% (2022 05:37) (98% - 99%)    I&O's Summary      PHYSICAL EXAM:  GENERAL: Laying comfortably, NAD  HEENT: NCAT, PERRLA, EOMI, no scleral icterus, no LAD  NECK: No JVD  LUNG: CTABL; No wheezes, crackles, or rhonchi  HEART: RRR; normal S1/S2; No murmurs, rubs, or gallops  ABDOMEN: +BS, soft, nontender, nondistended, no HSM; No rebound, guarding, or rigidity  EXTREMITIES:  No LE edema b/l, 2+ Peripheral Pulses, No clubbing, cyanosis, or edema  NEUROLOGY: AOx3, non-focal, strength 5/5 in all extremities, sensation intact  PSYCH: calm and cooperative  SKIN: No rashes or lesions    LABS:        139  |  104  |  9   ----------------------------<  110<H>  4.4   |  25  |  0.87      140  |  104  |  8   ----------------------------<  96  4.5   |  23  |  0.82    Ca    8.9      2022 05:00  Ca    9.3      2022 07:01    TPro  6.8  /  Alb  3.7  /  TBili  <0.2  /  DBili  x   /  AST  26  /  ALT  26  /  AlkPhos  47    TPro  7.3  /  Alb  4.1  /  TBili  0.2  /  DBili  x   /  AST  20  /  ALT  20  /  AlkPhos  50            Urinalysis Basic - ( 2022 13:20 )    Color: Yellow / Appearance: Slightly Turbid / S.033 / pH: x  Gluc: x / Ketone: Trace  / Bili: Negative / Urobili: 3 mg/dL   Blood: x / Protein: 30 mg/dL / Nitrite: Negative   Leuk Esterase: Negative / RBC: 3 /HPF / WBC 3 /HPF   Sq Epi: x / Non Sq Epi: 9 /HPF / Bacteria: Occasional          CAPILLARY BLOOD GLUCOSE            RADIOLOGY & ADDITIONAL TESTS:      Imaging Personally Reviewed: [ ] YES  [ ] NO    Consultants involved in case:   Consultant(s) Notes Reviewed:  [ ] YES  [ ] NO:   Care Discussed with Consultants/Other Providers [ ] YES  [ ] NO ***************************************************************  Fredrick Doyle, PGY1  Internal Medicine   [facility] Pager [phone]  [facility] Pager [phone]  ***************************************************************    YODIT MORROW  37y  MRN: [id number]  22 (13d)    Patient is a 37y old  Female who presents with a chief complaint of L shoulder pain (2022 06:18)      SUBJECTIVE / OVERNIGHT EVENTS: No acute overnight events. Pt seen and examined at bedside.   Reports an episode of CP @3-4am, similar in nature to feeling she has been experiencing for months. Per night coverage, patient was seen at that time, VSS, no acute distress. EKG with no changes. EKG with no new changes. Reports continued L arm pain, but improvement in L shoulder pain. No N/V, fevers, chills. Last BM yesterday.     12 Point ROS negative with the exception of the above    MEDICATIONS  (STANDING):  apixaban 5 milliGRAM(s) Oral every 12 hours  ARIPiprazole 30 milliGRAM(s) Oral daily  chlorhexidine 2% Cloths 1 Application(s) Topical daily  chlorproMAZINE    Tablet 50 milliGRAM(s) Oral <User Schedule>  diVALproex ER 1000 milliGRAM(s) Oral at bedtime  diVALproex  milliGRAM(s) Oral daily  doxycycline monohydrate Capsule 100 milliGRAM(s) Oral every 12 hours  levothyroxine 75 MICROGram(s) Oral daily  lurasidone 40 milliGRAM(s) Oral daily  melatonin 3 milliGRAM(s) Oral at bedtime  multivitamin 1 Tablet(s) Oral daily  pantoprazole    Tablet 40 milliGRAM(s) Oral before breakfast  polyethylene glycol 3350 17 Gram(s) Oral every 12 hours  QUEtiapine 200 milliGRAM(s) Oral two times a day  QUEtiapine 50 milliGRAM(s) Oral <User Schedule>  senna 2 Tablet(s) Oral at bedtime    MEDICATIONS  (PRN):  acetaminophen     Tablet .. 650 milliGRAM(s) Oral every 6 hours PRN Temp greater or equal to 38C (100.4F), Mild Pain (1 - 3)  ALBUTerol    90 MICROgram(s) HFA Inhaler 2 Puff(s) Inhalation every 6 hours PRN Shortness of Breath and/or Wheezing  chlorproMAZINE    IVPB 50 milliGRAM(s) IV Intermittent every 4 hours PRN agitation  diphenhydrAMINE 50 milliGRAM(s) Oral every 8 hours PRN Rash and/or Itching  diphenhydrAMINE Elixir 25 milliGRAM(s) Oral every 8 hours PRN Itching  oxyCODONE    IR 10 milliGRAM(s) Oral every 4 hours PRN Severe Pain (7 - 10)  oxyCODONE    IR 5 milliGRAM(s) Oral every 4 hours PRN Moderate Pain (4 - 6)      OBJECTIVE:  Vital Signs Last 24 Hrs  T(C): 36.8 (2022 05:37), Max: 36.8 (2022 22:08)  T(F): 98.3 (2022 05:37), Max: 98.3 (2022 05:37)  HR: 75 (2022 05:37) (70 - 98)  BP: 102/64 (2022 05:37) (101/60 - 108/72)  BP(mean): --  RR: 17 (2022 05:37) (16 - 17)  SpO2: 98% (2022 05:37) (98% - 99%)    I&O's Summary      PHYSICAL EXAM:  GENERAL: Laying comfortably, NAD  HEENT: NCAT, EOMI, no scleral icterus, no LAD  LUNG: CTABL; No wheezes, crackles, or rhonchi  HEART: RRR; normal S1/S2; No murmurs, rubs, or gallops  ABDOMEN: +BS, soft, nontender, nondistended  EXTREMITIES:  2+  LE edema b/l, 2+ Peripheral Pulses throughout, Increased R hand finger squeeze strength, L finger squeeze and extension slightly limited by pain  NEUROLOGY: AOx3, non-focal, strength 5/5 in all extremities, sensation intact  PSYCH: calm and cooperative  SKIN: No rashes or lesions    LABS:  **Refused labs this am**      139  |  104  |  9   ----------------------------<  110<H>  4.4   |  25  |  0.87      140  |  104  |  8   ----------------------------<  96  4.5   |  23  |  0.82    Ca    8.9      2022 05:00  Ca    9.3      2022 07:01    TPro  6.8  /  Alb  3.7  /  TBili  <0.2  /  DBili  x   /  AST  26  /  ALT  26  /  AlkPhos  47    TPro  7.3  /  Alb  4.1  /  TBili  0.2  /  DBili  x   /  AST  20  /  ALT  20  /  AlkPhos  50            Urinalysis Basic - ( 2022 13:20 )    Color: Yellow / Appearance: Slightly Turbid / S.033 / pH: x  Gluc: x / Ketone: Trace  / Bili: Negative / Urobili: 3 mg/dL   Blood: x / Protein: 30 mg/dL / Nitrite: Negative   Leuk Esterase: Negative / RBC: 3 /HPF / WBC 3 /HPF   Sq Epi: x / Non Sq Epi: 9 /HPF / Bacteria: Occasional    Consultants involved in case: ID, IR  Consultant(s) Notes Reviewed:  [X] YES  [ ] NO:   Care Discussed with Consultants/Other Providers [X] YES  [ ] NO

## 2022-07-01 NOTE — PROGRESS NOTE ADULT - PROBLEM SELECTOR PLAN 8
- Hx of RLE DVT, on Eliquis 5mg BID outpatient   - restarted home eliquis - Hx of RLE DVT, on Eliquis 5mg BID outpatient   - c/w home eliquis

## 2022-07-01 NOTE — DISCHARGE NOTE PROVIDER - NSDCCPTREATMENT_GEN_ALL_CORE_FT
PRINCIPAL PROCEDURE  Procedure: CT shoulder LT  Findings and Treatment: IMPRESSION:  Left upper extremity abscess closely associated with the deltoid   musculature.  A couple foci of subcutaneous gas along the inferior margin of the   collection are most likely related to recent upper extremity injection.   Close clinical follow-up is recommended to exclude a necrotizing soft   tissue infection.

## 2022-07-01 NOTE — DISCHARGE NOTE PROVIDER - NSFOLLOWUPCLINICS_GEN_ALL_ED_FT
[facility] - Infectious Disease  Infectious Disease  [address], [place] [address]  Phone: [phone]  Fax:      [facility] - Infectious Disease  Infectious Disease  [address], [place] [address]  Phone: [phone]  Fax:   Follow Up Time: 2 weeks    [facility] - Urology  Urology  [address], 3rd & 4th [place] [address]  Phone: [phone]  Fax:   Follow Up Time: 2 weeks

## 2022-07-01 NOTE — PROGRESS NOTE ADULT - ASSESSMENT
37 yo woman with bipolar ds, Factor V Leiden presented 6/18 with chills and decreased movement left arm found to have complex fluid collection left deltoid muscle suspected abscess vs hematoma due to IM injection.  Unknown organism.    Had been hospitalized [facility] 6/11 for right arm swelling s/p IR aspiration - culture negative    s/p IR aspiration 6/29- NOS on gram stain culture no growth to date    Left deltoid hematoma vs abscess    Has received 3 week of antibiotics  6/11-    Would continue vanco while remains hospitalized     Follow cultures until final  If culture negative d/c vanco

## 2022-07-01 NOTE — CHART NOTE - NSCHARTNOTEFT_GEN_A_CORE
After third code flight of the evening, patient was placed in 4-point restraints and given haldol IM 5mg and ativan IM 2mg for agitation, risk of harm to self/others, and risk of elopement. Psych was consulted and will be following up tomorrow.    Juan Floyd, PGY1

## 2022-07-01 NOTE — PROGRESS NOTE ADULT - PROBLEM SELECTOR PLAN 1
Patient presented with abscess of the L shoulder secondary to IM injection. No leukocytosis however was tachycardic and mildly hypotensive.  - previously had R shoulder abscess in the past  - CT L shoulder shows LUE abscess closely associated with the deltoid musculature  - surgery consulted, no indication for intervention, negative for nec fasc   - Ultrasound demonstrating R arm abscess   - 6/25 - Pt now w infiltrated IV, swelling of RUE and hand w/ suspected thrombophlebitis.  - 6/25 Switched from IV vanco to oral clindamycin 2/2 infiltrated IV  - 6/27 IV access obtained, switched back to IV vanco 1.25g BID  - 6/30 IV infiltrated, switched from IV vanco to oral doxycycline 100 BID  - c/w oral doxycycline  - I&D 6/29 by IR - gram stain w/out organisms  - c/w oxycodone PRN for pain  - ID following, IR following, recs appreciated Patient presented with abscess of the L shoulder secondary to IM injection. No leukocytosis however was tachycardic and mildly hypotensive.  - previously had R shoulder abscess in the past  - CT L shoulder shows LUE abscess closely associated with the deltoid musculature  - surgery consulted, no indication for intervention, negative for nec fasc   - Ultrasound demonstrating R arm abscess   - 6/25 - Pt now w infiltrated IV, swelling of RUE and hand w/ suspected thrombophlebitis.  - 6/25 Switched from IV vanco to oral clindamycin 2/2 infiltrated IV  - 6/27 IV access obtained, switched back to IV vanco 1.25g BID  - 6/30 IV infiltrated, switched from IV vanco to oral doxycycline 100 BID  - c/w oral doxycycline  - I&D 6/29 by IR - gram stain w/out organisms, f/u final read  - c/w oxycodone for pain  - Plan: d/c today with 5 days of doxycycline 100mg BID  - ID following, IR following, recs appreciated

## 2022-07-01 NOTE — DISCHARGE NOTE PROVIDER - NSDCFUADDAPPT_GEN_ALL_CORE_FT
Please follow up with your primary care provider, infectious disease, urology and psych.    YOU SHOULD NOT HAVE ANY FURTHER INTRAMUSCULAR MEDICATIONS DUE TO BLEEDING RISK ON ELIQUIS AND ABSCESSES.

## 2022-07-01 NOTE — DISCHARGE NOTE PROVIDER - NSDCMRMEDTOKEN_GEN_ALL_CORE_FT
Ala-Maximo 1% topical cream: 1 application topically once  albuterol 90 mcg/inh inhalation aerosol: 2 puff(s) inhaled every 6 hours, As needed, Shortness of Breath and/or Wheezing  ARIPiprazole 30 mg oral tablet: 1 tab(s) orally once a day  Ativan 0.5 mg oral tablet: 1 tab(s) orally 2 times a day  [10AM and 4PM] MDD:1mg  chlorproMAZINE 50 mg oral tablet: 1 tab(s) orally every 6 hours, As Needed  Depakote  mg oral tablet, extended release: 1 tab(s) orally once a day  Depakote  mg oral tablet, extended release: 2 tab(s) orally once a day (at bedtime)  diphenhydrAMINE 25 mg oral capsule: 1 cap(s) orally every 6 hours, As needed, Rash and/or Itching  docusate sodium 100 mg oral capsule: 1 cap(s) orally once a day (at bedtime), As Needed  doxycycline hyclate 100 mg oral capsule: 1 cap(s) orally 2 times a day   Eliquis 5 mg oral tablet: 1 tab(s) orally 2 times a day   fluticasone 50 mcg/inh nasal spray: 1 spray(s) nasal 2 times a day  levothyroxine 75 mcg (0.075 mg) oral tablet: 1 tab(s) orally once a day  lurasidone 40 mg oral tablet: 1 tab(s) orally once a day  melatonin 3 mg oral tablet: 1 tab(s) orally once a day (at bedtime)  Multiple Vitamins with Iron oral tablet: 1 tab(s) orally once a day  olopatadine 0.1% ophthalmic solution: 1 drop(s) to each affected eye every 6 hours, As Needed  pantoprazole 40 mg oral delayed release tablet: 1 tab(s) orally once a day (before a meal)  polyethylene glycol 3350 oral powder for reconstitution: 17 gram(s) orally once a day  QUEtiapine 50 mg oral tablet: 1 tab(s) orally once a day at noon  SEROquel 200 mg oral tablet: 1 tab(s) orally 2 times a day [8AM and 8PM]  SMZ-TMP  mg-160 mg oral tablet: 1 tab(s) orally every 12 hours  tiZANidine 2 mg oral tablet: 2 tab(s) orally 2 times a day, As Needed  Tylenol 325 mg oral tablet: 2 tab(s) orally every 6 hours, As Needed  Vitamin D2 1.25 mg (50,000 intl units) oral capsule: 1 cap(s) orally once a week   Ala-Maximo 1% topical cream: 1 application topically once  albuterol 90 mcg/inh inhalation aerosol: 2 puff(s) inhaled every 6 hours, As needed, Shortness of Breath and/or Wheezing  ARIPiprazole 30 mg oral tablet: 1 tab(s) orally once a day  chlorproMAZINE 50 mg oral tablet: 1 tab(s) orally every 6 hours, As Needed  diphenhydrAMINE 25 mg oral capsule: 1 cap(s) orally every 6 hours, As needed, Rash and/or Itching  divalproex sodium 500 mg oral tablet, extended release: 1 tab(s) orally once a day  divalproex sodium 500 mg oral tablet, extended release: 2 tab(s) orally once a day (at bedtime)  docusate sodium 100 mg oral capsule: 1 cap(s) orally once a day (at bedtime), As Needed  doxycycline hyclate 100 mg oral capsule: 1 cap(s) orally 2 times a day   Eliquis 5 mg oral tablet: 1 tab(s) orally 2 times a day   fluticasone 50 mcg/inh nasal spray: 1 spray(s) nasal 2 times a day  levothyroxine 75 mcg (0.075 mg) oral tablet: 1 tab(s) orally once a day  lurasidone 40 mg oral tablet: 1 tab(s) orally once a day  melatonin 3 mg oral tablet: 1 tab(s) orally once a day (at bedtime)  Multiple Vitamins with Iron oral tablet: 1 tab(s) orally once a day  olopatadine 0.1% ophthalmic solution: 1 drop(s) to each affected eye every 6 hours, As Needed  pantoprazole 40 mg oral delayed release tablet: 1 tab(s) orally once a day (before a meal)  polyethylene glycol 3350 oral powder for reconstitution: 17 gram(s) orally once a day  QUEtiapine 200 mg oral tablet: 1 tab(s) orally 2 times a day  QUEtiapine 50 mg oral tablet: 1 tab(s) orally once a day  tiZANidine 2 mg oral tablet: 2 tab(s) orally 2 times a day, As Needed  Tylenol 325 mg oral tablet: 2 tab(s) orally every 6 hours, As Needed  Vitamin D2 1.25 mg (50,000 intl units) oral capsule: 1 cap(s) orally once a week

## 2022-07-01 NOTE — PROGRESS NOTE ADULT - PROBLEM SELECTOR PLAN 2
Patient unknown timeframe of LE swelling b/l; believes that it started in March   - Hx of factor V Leiden  - Pro BNP w/i nl  - reports LLE pain w/ edema > RLE  - 6/14 US doppler of b/l LE: no evidence of DVTs  - 6/27 LLE duplex ultrasound - no evidence of DVT  - 6/28 b/l LE swelling, recommend ace bandages/compression stockings  - 6/29 improving w/ ace bandages  - continue to monitor

## 2022-07-01 NOTE — DISCHARGE NOTE PROVIDER - CARE PROVIDERS DIRECT ADDRESSES
,DirectAddress_Unknown ,DirectAddress_Unknown,[email or web address]

## 2022-07-01 NOTE — PROGRESS NOTE ADULT - PROBLEM SELECTOR PLAN 4
Pt had episodic dysuria 6/26  - UA 6/26 wnl  - Ucx 6/26 neg  - Reports darkened urine this am  - UA 6/30 - proteinuria, slight ketonuria, turbid, occasional bacteria Pt had episodic dysuria 6/26  - UA 6/26 wnl  - Ucx 6/26 neg  - UA 6/30 - proteinuria, slight ketonuria, turbid, occasional bacteria; mucous present

## 2022-07-01 NOTE — DISCHARGE NOTE PROVIDER - NSDCFUSCHEDAPPT_GEN_ALL_CORE_FT
Raoul Weller  [facility]  CARDIOLOGY [id number]  Scheduled Appointment: 07/11/2022    [address]  Scheduled Appointment: 07/22/2022    Omar Hernandez  [address]  Scheduled Appointment: 07/22/2022

## 2022-07-01 NOTE — DISCHARGE NOTE PROVIDER - NSDCCPCAREPLAN_GEN_ALL_CORE_FT
PRINCIPAL DISCHARGE DIAGNOSIS  Diagnosis: Abscess of left upper extremity  Assessment and Plan of Treatment: You were admitted to hospital for abscess of left upper extremity. This was likely in setting of intramuscular psych medications and blood thinner. In hospital, you were given IV antibiotics and interventional radiology drained your abscess. Please continue antibiotics after discharge.   If pain worsens, fevers develop, or have chills, please call your doctor or return to ED. Please follow with ID after discharge.       PRINCIPAL DISCHARGE DIAGNOSIS  Diagnosis: Abscess of left upper extremity  Assessment and Plan of Treatment: You were admitted to hospital for abscess of left upper extremity. This was likely in setting of intramuscular psych medications and blood thinner. In hospital, you were given IV antibiotics and interventional radiology drained your abscess. Please continue antibiotics after discharge.   If pain worsens, fevers develop, or have chills, please call your doctor or return to ED. Please follow with ID after discharge.  PATIENT SHOULD NOT HAVE ANY FURTHER IM MEDICATIONS DUE TO BLEEDING RISK ON ELIQUIS AND ABSCESSES.         PRINCIPAL DISCHARGE DIAGNOSIS  Diagnosis: Abscess of left upper extremity  Assessment and Plan of Treatment: You were admitted to hospital for abscess of left upper extremity. This was likely in setting of intramuscular psych medications and blood thinner. In hospital, you were given IV antibiotics and interventional radiology drained your abscess. Please continue antibiotics after discharge.   If pain worsens, fevers develop, or have chills, please call your doctor or return to ED. Please follow with ID after discharge.  YOU SHOULD NOT HAVE ANY FURTHER INTRAMUSCULAR MEDICATIONS DUE TO BLEEDING RISK ON ELIQUIS AND ABSCESSES.

## 2022-07-01 NOTE — DISCHARGE NOTE PROVIDER - PROVIDER TOKENS
FREE:[LAST:[Aleyda],FIRST:[Fabi],PHONE:[phone]],FAX:[(   )    -],FOLLOWUP:[2 weeks]] FREE:[LAST:[Aleyda],FIRST:[Fabi],PHONE:[phone]],FAX:[(   )    -],FOLLOWUP:[2 weeks]],PROVIDER:[TOKEN:[[id number]],FOLLOWUP:[2 weeks]]

## 2022-07-01 NOTE — DISCHARGE NOTE PROVIDER - HOSPITAL COURSE
Discharge Summary     Admission diagnoses:   ***    Discharge diagnoses:   ***    Hospital Course:   For full details, please see H&P, progress notes, consult notes and ancillary notes. Briefly, *** name is a 37yFemale with a history of ***. The patient's hospital course will be summarized in a problem based format.    # ***    # ***    On day of discharge, patient is clinically stable with no new exam findings or acute symptoms compared to prior. The patient was seen by the attending physician on the date of discharge and deemed satable and acceptable for discharge. The patient's chronic medical conditions were treated accordingly per the patient's home medication regimen. The patient's medication reconciliation (with changes made to chronic medications), follow up appointments, discharge orders, instructions, and signifcant lab and diagnostic studies are as noted.     Discharge follow up action items:     1. Follow up with PCP in 1-2 weeks.   2. Follow up labs ***  3. Medication changes ***  4. On hold medications ***  5. Incidentalomas    Patient's ordered code status: ***    Patient disposition: ***        Discharge Summary     Admission diagnoses:   Left deltoid abscess    Discharge diagnoses:   Left deltoid abscess- s/p  drainage with IR    Hospital Course:   For full details, please see H&P, progress notes, consult notes and ancillary notes. Briefly, Jt Marte name is a 37yFemale with a history of  bipolar disorder, hypothyroidism, factor V Leiden on eliquis, seizures. The patient's hospital course will be summarized in a problem based format.    # Left deltoid abscess  Patient was admitted to hospital due to left deltoid abscess. ID, IR, surgery were consulted. ID recommended drainage of abscess. Patient was on oral and IV antibiotics while pending abscess drainage due to problems with IV access. After several days, legal guardianship was determined and consent was obtained. Patient had abscess drainage with IR and was continued on oral antibiotics for after discharge.     On day of discharge, patient is clinically stable with no new exam findings or acute symptoms compared to prior. The patient was seen by the attending physician on the date of discharge and deemed satable and acceptable for discharge. The patient's chronic medical conditions were treated accordingly per the patient's home medication regimen. The patient's medication reconciliation (with changes made to chronic medications), follow up appointments, discharge orders, instructions, and signifcant lab and diagnostic studies are as noted.     Discharge follow up action items:     1. Follow up with PCP in 1-2 weeks.   2. Follow up labs   3. Medication changes add doxycycline 100 BID  4. On hold medications   5. Incidentalomas      Patient disposition: Group home        Discharge Summary     Admission diagnoses:   Left deltoid abscess    Discharge diagnoses:   Left deltoid abscess- s/p  drainage with IR    Hospital Course:   For full details, please see H&P, progress notes, consult notes and ancillary notes. Briefly, Jt Marte name is a 37yFemale with a history of  bipolar disorder, hypothyroidism, factor V Leiden on eliquis, seizures. The patient's hospital course will be summarized in a problem based format.    # Left deltoid abscess  Patient was admitted to hospital due to left deltoid abscess. ID, IR, surgery were consulted. ID recommended drainage of abscess. Patient was on oral and IV antibiotics while pending abscess drainage due to problems with IV access. After several days, legal guardianship was determined and consent was obtained. Patient had abscess drainage with IR and was continued on oral antibiotics for after discharge.     On day of discharge, patient is clinically stable with no new exam findings or acute symptoms compared to prior. The patient was seen by the attending physician on the date of discharge and deemed satable and acceptable for discharge. The patient's chronic medical conditions were treated accordingly per the patient's home medication regimen. The patient's medication reconciliation (with changes made to chronic medications), follow up appointments, discharge orders, instructions, and signifcant lab and diagnostic studies are as noted.     Discharge follow up action items:     1. Follow up with PCP in 1-2 weeks. Follow up with ID, urology, psych as well.   2. Follow up labs   3. Medication changes add doxycycline 100 BID  4. On hold medications   5. Incidentalomas      Patient disposition: Group home        Discharge Summary     Admission diagnoses:   Left deltoid abscess    Discharge diagnoses:   Left deltoid abscess- s/p  drainage with IR    Hospital Course:   For full details, please see H&P, progress notes, consult notes and ancillary notes. Briefly, Jt Marte name is a 37yFemale with a history of  bipolar disorder, hypothyroidism, factor V Leiden on eliquis, seizures. The patient's hospital course will be summarized in a problem based format.    # Left deltoid abscess  Patient was admitted to hospital due to left deltoid abscess. ID, IR, surgery were consulted. ID recommended drainage of abscess. Patient was on oral and IV antibiotics while pending abscess drainage due to problems with IV access. After several days, legal guardianship was determined and consent was obtained. Patient had abscess drainage with IR and was continued on oral antibiotics for after discharge. Pain controlled with opioid medication in hospital; patient to be discharged on tylenol PRN for pain. Patient to continue doxycycline 100 BID for 5 days.     #Dysuria  Patient noted to have dysuria at times throughout admission. Urine analysis negative for nitrites or leukocyte esterase throughout admission. Patient was on antibiotics throughout for left deltoid abscess. Dysuria improved, patient can follow with urology.       On day of discharge, patient is clinically stable with no new exam findings or acute symptoms compared to prior. The patient was seen by the attending physician on the date of discharge and deemed satable and acceptable for discharge. The patient's chronic medical conditions were treated accordingly per the patient's home medication regimen. The patient's medication reconciliation (with changes made to chronic medications), follow up appointments, discharge orders, instructions, and signifcant lab and diagnostic studies are as noted.     Discharge follow up action items:     1. Follow up with PCP in 1-2 weeks. Follow up with ID, urology, psych as well.   2. Follow up labs   3. Medication changes add doxycycline 100 BID for 5 days.   4. On hold medications   5. Incidentalomas    PATIENT SHOULD NOT HAVE ANY FURTHER IM MEDICATIONS DUE TO BLEEDING RISK ON ELIQUIS AND ABSCESSES.    Patient disposition: Group home        Discharge Summary     Admission diagnoses:   Left deltoid abscess    Discharge diagnoses:   Left deltoid abscess- s/p  drainage with IR    Hospital Course:   For full details, please see H&P, progress notes, consult notes and ancillary notes. Briefly, Jt Marte name is a 37yFemale with a history of  bipolar disorder, hypothyroidism, factor V Leiden on eliquis, seizures. The patient's hospital course will be summarized in a problem based format.    # Left deltoid abscess  Patient was admitted to hospital due to left deltoid abscess. ID, IR, surgery were consulted. ID recommended drainage of abscess. Patient was on oral and IV antibiotics while pending abscess drainage due to problems with IV access. After several days, legal guardianship was determined and consent was obtained. Patient had abscess drainage with IR and was continued on oral antibiotics for after discharge. Pain controlled with opioid medication in hospital; patient to be discharged on tylenol PRN for pain. Patient to continue doxycycline 100 BID for 5 days.     #Dysuria  Patient noted to have dysuria at times throughout admission. Urine analysis negative for nitrites or leukocyte esterase throughout admission. Patient was on antibiotics throughout for left deltoid abscess. Dysuria improved, patient can follow with urology.       On day of discharge, patient is clinically stable with no new exam findings or acute symptoms compared to prior. The patient was seen by the attending physician on the date of discharge and deemed satable and acceptable for discharge. The patient's chronic medical conditions were treated accordingly per the patient's home medication regimen. The patient's medication reconciliation (with changes made to chronic medications), follow up appointments, discharge orders, instructions, and signifcant lab and diagnostic studies are as noted.     Discharge follow up action items:     1. Follow up with PCP in 1-2 weeks. Follow up with ID, urology, psych as well.   2. Follow up labs   3. Medication changes add doxycycline 100 BID for 5 days.   4. On hold medications       PATIENT SHOULD NOT HAVE ANY FURTHER IM MEDICATIONS DUE TO BLEEDING RISK ON ELIQUIS AND ABSCESSES.    Patient disposition: Group home        Discharge Summary     Admission diagnoses:   Left deltoid abscess    Discharge diagnoses:   Left deltoid abscess- s/p  drainage with IR    Hospital Course:   For full details, please see H&P, progress notes, consult notes and ancillary notes. Briefly, Jt Marte name is a 37yFemale with a history of  bipolar disorder, hypothyroidism, factor V Leiden on eliquis, seizures. The patient's hospital course will be summarized in a problem based format.    # Left deltoid abscess  Patient was admitted to hospital due to left deltoid abscess. ID, IR, surgery were consulted. ID recommended drainage of abscess. Patient was on oral and IV antibiotics while pending abscess drainage due to problems with IV access. After several days, legal guardianship was determined and consent was obtained. Patient had abscess drainage with IR and was continued on oral antibiotics for after discharge. Pain controlled with opioid medication in hospital; patient to be discharged on tylenol PRN for pain. Patient to continue doxycycline 100 BID for 5 days.     #Dysuria  Patient noted to have dysuria at times throughout admission. Urine analysis negative for nitrites or leukocyte esterase throughout admission. Patient was on antibiotics throughout for left deltoid abscess. Dysuria improved, patient can follow with urology.     #Bipolar Disorder  Patient was evaluated by psychiatry during her hospitalization. Patient was initially on a 1:1 but that was discontinued due to no instances of agitation/aggression or other concerning behaviors. On 7/1 - patient attempted elopement due to transport to group home taking longer than expected and being agitated at discontinuation of narcotic pain management. Patient required 4 point restraints, Haldol, and Ativan. On 7/2 (day of discharge) patient was calm and cooperative without suicidal ideation or homicidal ideation. No concerns for discharge from a psych standpoint at this time.     On day of discharge, patient is clinically stable with no new exam findings or acute symptoms compared to prior. The patient was seen by the attending physician on the date of discharge and deemed satable and acceptable for discharge. The patient's chronic medical conditions were treated accordingly per the patient's home medication regimen. The patient's medication reconciliation (with changes made to chronic medications), follow up appointments, discharge orders, instructions, and signifcant lab and diagnostic studies are as noted.     Discharge follow up action items:     1. Follow up with PCP in 1-2 weeks. Follow up with ID, urology, psych as well.   2. Follow up labs   3. Medication changes add doxycycline 100 BID for 5 days.   4. On hold medications       PATIENT SHOULD NOT HAVE ANY FURTHER IM MEDICATIONS DUE TO BLEEDING RISK ON ELIQUIS AND ABSCESSES.    Patient disposition: Group home

## 2022-07-01 NOTE — PROGRESS NOTE ADULT - SUBJECTIVE AND OBJECTIVE BOX
Follow Up:  left deltoid collection    Interval History/ROS:   s/p IR drain left deltoid collection 4 cc dark red fluid aspirated.    upset about pain medication regimen     afebrile    Allergies  latex (Blisters)  penicillin (Hives)  penicillin (Other)  pineapple (Unknown)  Toradol (Rash)  Toradol (Unknown)  Zofran (Hives)  Zofran (Unknown)        ANTIMICROBIALS:  vancomycin        MEDICATIONS  (STANDING):  acetaminophen     Tablet .. 650 every 6 hours PRN  ALBUTerol    90 MICROgram(s) HFA Inhaler 2 every 6 hours PRN  apixaban 5 every 12 hours  ARIPiprazole 30 daily  chlorproMAZINE    IVPB 50 every 4 hours PRN  chlorproMAZINE    Tablet 50 <User Schedule>  diphenhydrAMINE 50 every 8 hours PRN  diphenhydrAMINE Elixir 25 every 8 hours PRN  diVALproex ER 1000 at bedtime  diVALproex  daily  levothyroxine 75 daily  lurasidone 40 daily  melatonin 3 at bedtime  )pantoprazole    Tablet 40 before breakfast  polyethylene glycol 3350 17 every 12 hours  QUEtiapine 200 two times a day  QUEtiapine 50 <User Schedule>  senna 2 at bedtime    Vital Signs Last 24 Hrs  T(F): 98.3 (07-01-22 @ 05:37), Max: 98.3 (07-01-22 @ 05:37)  HR: 75 (07-01-22 @ 05:37)  BP: 102/64 (07-01-22 @ 05:37)  RR: 17 (07-01-22 @ 05:37)  SpO2: 98% (07-01-22 @ 05      PHYSICAL EXAM:  Constitutional: tearful non toxic  Eyes: No icterus.  RS: Chest clear   CVS: S1, S2   Abdomen: Soft.  : no markham  Extremities: left lateral upper arm with decreased swelling no erythema  Neuro: Alert, oriented to time/place/person  Cranial nerves 2-12 grossly normal. No focal abnormalities        MICROBIOLOGY:    clCulture - Joint Fluid (06.29.22 @ 14:00)   Gram Stain:   Moderate polymorphonuclear leukocytes seen per low power field   No organisms seen per oil power field   Specimen Source: Joint Fl L SHOULDER   Culture Results:   No growth to date.     .Blood Blood-Venous  06-17-22   No Growth Final  --  --      .Blood Blood-Peripheral  06-17-22   No Growth Final  --  --      .Body Fluid Interstitial Fluid  06-16-22   No growth at 1 week.  --  --      .Body Fluid righ arm fluid collection  06-13-22   No growth  --    No polymorphonuclear cells seen per low power field  No organisms seen per oil power field      .Abscess Arm - Right  06-10-22   No growth at 5 days  --  --      .Blood Blood-Peripheral  06-10-22   No Growth Final  --  --      Clean Catch Clean Catch (Midstream)  06-04-22   >=3 organisms. Probable collection contamination.  --  --        RADIOLOGY:    r< from: US Duplex Venous Lower Ext Ltd, Left (06.27.22 @ 09:43) >  CC: [id number] EXAM:  US DPLX LWR EXT VEINS LTD LT                          PROCEDURE DATE:  06/27/2022          INTERPRETATION:  CLINICAL INFORMATION: Left lower extremity pain and   edema. Factor V Leiden.    COMPARISON: None available.    TECHNIQUE: Duplex sonography of the LEFT LOWER extremity veins with color   and spectral Doppler, with and without compression.    FINDINGS:    There is normal compressibility of the left common femoral, femoral and   popliteal veins.  The contralateral common femoral vein is patent.  Doppler examination shows normal spontaneous and phasic flow.    No calf vein thrombosis is detected.    IMPRESSION:  No evidence of left lower extremity deep venous thrombosis.          --- End of Report ---      < end of copied text >  < from: US Extremity Nonvasc Limited, Left (06.23.22 @ 10:03) >  ACC: [id number] EXAM:  US NONVASC EXT LTD LT                          PROCEDURE DATE:  06/23/2022          INTERPRETATION:  CLINICAL INFORMATION:  Left upper arm redness and   swelling. Concern for abscess.    TECHNIQUE: US NONVASC EXTREMITY LIMITED LEFT.    COMPARISON: None available.    FINDINGS:  Targeted ultrasound in the left upper arm underlying the indicated region   of concern demonstrates a complex fluid collection without evidence of   surrounding increased vascularity measuring 3.1 x 1.6 x 4.0 cm.    IMPRESSION:    Complex fluid collection in the left upper arm measuring 3.1 x 1.6 x 4.0   cm.    --- End of Report ---      < end of copied text >

## 2022-07-01 NOTE — PROGRESS NOTE ADULT - PROBLEM SELECTOR PLAN 9
Diet: regular  DVT: restarted home reji hardy Carol: [phone]    Legal guardianship :   of [employer] Maria Elena Armijo: [phone]   Cliff Carlton Raza ([phone]), Diet: regular  DVT: c/w home eliqukandice    Case mangbi Carol: [phone]    Legal guardianship :   of [employer] Maria Elena Armijo: [phone]   Cliff Carlton Raza ([phone]),

## 2022-07-02 VITALS
RESPIRATION RATE: 16 BRPM | HEART RATE: 84 BPM | OXYGEN SATURATION: 98 % | DIASTOLIC BLOOD PRESSURE: 61 MMHG | TEMPERATURE: 98 F | SYSTOLIC BLOOD PRESSURE: 103 MMHG

## 2022-07-02 PROCEDURE — 99239 HOSP IP/OBS DSCHRG MGMT >30: CPT | Mod: GC

## 2022-07-02 RX ORDER — CHLORPROMAZINE HCL 10 MG
1 TABLET ORAL
Qty: 120 | Refills: 0
Start: 2022-07-02 | End: 2022-07-31

## 2022-07-02 RX ADMIN — Medication 75 MICROGRAM(S): at 05:26

## 2022-07-02 RX ADMIN — PANTOPRAZOLE SODIUM 40 MILLIGRAM(S): 20 TABLET, DELAYED RELEASE ORAL at 06:54

## 2022-07-02 RX ADMIN — Medication 50 MILLIGRAM(S): at 06:54

## 2022-07-02 RX ADMIN — DIVALPROEX SODIUM 500 MILLIGRAM(S): 500 TABLET, DELAYED RELEASE ORAL at 11:52

## 2022-07-02 RX ADMIN — LURASIDONE HYDROCHLORIDE 40 MILLIGRAM(S): 40 TABLET ORAL at 11:53

## 2022-07-02 RX ADMIN — Medication 1 TABLET(S): at 11:53

## 2022-07-02 RX ADMIN — APIXABAN 5 MILLIGRAM(S): 2.5 TABLET, FILM COATED ORAL at 08:51

## 2022-07-02 RX ADMIN — CHLORHEXIDINE GLUCONATE 1 APPLICATION(S): 213 SOLUTION TOPICAL at 11:58

## 2022-07-02 RX ADMIN — QUETIAPINE FUMARATE 200 MILLIGRAM(S): 200 TABLET, FILM COATED ORAL at 05:26

## 2022-07-02 RX ADMIN — QUETIAPINE FUMARATE 50 MILLIGRAM(S): 200 TABLET, FILM COATED ORAL at 11:52

## 2022-07-02 RX ADMIN — ARIPIPRAZOLE 30 MILLIGRAM(S): 15 TABLET ORAL at 11:54

## 2022-07-02 RX ADMIN — POLYETHYLENE GLYCOL 3350 17 GRAM(S): 17 POWDER, FOR SOLUTION ORAL at 05:27

## 2022-07-02 RX ADMIN — Medication 50 MILLIGRAM(S): at 11:52

## 2022-07-02 RX ADMIN — Medication 100 MILLIGRAM(S): at 06:36

## 2022-07-02 NOTE — PROGRESS NOTE ADULT - PROBLEM SELECTOR PLAN 1
Patient presented with abscess of the L shoulder secondary to IM injection. No leukocytosis however was tachycardic and mildly hypotensive.  - previously had R shoulder abscess in the past  - CT L shoulder shows LUE abscess closely associated with the deltoid musculature  - surgery consulted, no indication for intervention, negative for nec fasc   - Ultrasound demonstrating R arm abscess   - 6/25 - Pt now w infiltrated IV, swelling of RUE and hand w/ suspected thrombophlebitis.  - 6/25 Switched from IV vanco to oral clindamycin 2/2 infiltrated IV  - 6/27 IV access obtained, switched back to IV vanco 1.25g BID  - 6/30 IV infiltrated, switched from IV vanco to oral doxycycline 100 BID  - c/w oral doxycycline  - I&D 6/29 by IR - gram stain w/out organisms, f/u final read  - c/w oxycodone for pain  - Plan: d/c today with 5 days of doxycycline 100mg BID  - ID following, IR following, recs appreciated

## 2022-07-02 NOTE — PROGRESS NOTE ADULT - PROBLEM SELECTOR PROBLEM 1
Abscess of shoulder

## 2022-07-02 NOTE — PROGRESS NOTE ADULT - PROBLEM SELECTOR PROBLEM 5
Adult hypothyroidism

## 2022-07-02 NOTE — PROGRESS NOTE ADULT - PROVIDER SPECIALTY LIST ADULT
Infectious Disease
Infectious Disease
Intervent Radiology
Surgery
Internal Medicine

## 2022-07-02 NOTE — CHART NOTE - NSCHARTNOTEFT_GEN_A_CORE
Brief BH CL note    Pt cleared psychiatrically for discharge to group home. Patient has legal guardian Maria Elena Brown [phone], patient CANNOT refuse transfer back to group home when medically stable. Discussed w/ supervising attending Dr. Louis Jeong.    Magen Ackerman PGY3  psychiatry

## 2022-07-02 NOTE — PROGRESS NOTE ADULT - REASON FOR ADMISSION
L shoulder pain

## 2022-07-02 NOTE — PROGRESS NOTE ADULT - PROBLEM SELECTOR PLAN 3
Patient has hx of bipolar disorder and is currently on multiple antipsychotics: High dose seroquel, abilify, chlorpromazine ,lurasidone, tizanidine  - Qtc 460   - Abilfy 30mg daily, Latuda 40mg at noon  - Depakote  daily and 1000 night   - Seroquel 200mg at 8am and 8pm and at 12PM   - Chlorpromazine 50mg 4x daily   - If Agitated: Chlorpromazine 50mg IM q4h prn with Ativan 2mg IM q4h prn but avoid arms   - Psych following, recs appreciated Patient has hx of bipolar disorder and is currently on multiple antipsychotics: High dose seroquel, abilify, chlorpromazine ,lurasidone, tizanidine  - Qtc 460   - Abilfy 30mg daily, Latuda 40mg at noon  - Depakote  daily and 1000 night   - Seroquel 200mg at 8am and 8pm and at 12PM   - Chlorpromazine 50mg 4x daily   - If Agitated: Chlorpromazine 50mg IM q4h prn with Ativan 2mg IM q4h prn but avoid arms   - 7/1 evening - required IM haldol and ativan w/ 4 point restraints due to agitation  - 7/2 - patient calm, from psych standpoint stable for discharge now  - Psych following, recs appreciated

## 2022-07-02 NOTE — PROGRESS NOTE ADULT - SUBJECTIVE AND OBJECTIVE BOX
***************************************************************  Fredrick Doyle, PGY1  Internal Medicine   [facility] Pager [phone]  [facility] Pager [phone]  ***************************************************************    YODIT MORROW  37y  MRN: [id number]  22 (14d)    Patient is a 37y old  Female who presents with a chief complaint of L shoulder pain (2022 14:52)      SUBJECTIVE / OVERNIGHT EVENTS:   Overnight: Patient was attempting to elope last night and was refusing transportation to group home for discharge. After third code flight of the evening, patient was placed in 4-point restraints and given haldol IM 5mg and ativan IM 2mg for agitation, risk of harm to self/others, and risk of elopement. Psych was consulted and will be following up tomorrow.     Pt seen and examined at bedside. Denies fevers, chills, CP, SOB, Abdominal pain, N/V, Constipation, Diarrhea. Last BM     12 Point ROS negative with the exception of the above    MEDICATIONS  (STANDING):  apixaban 5 milliGRAM(s) Oral every 12 hours  ARIPiprazole 30 milliGRAM(s) Oral daily  chlorhexidine 2% Cloths 1 Application(s) Topical daily  chlorproMAZINE    Tablet 50 milliGRAM(s) Oral <User Schedule>  diVALproex ER 1000 milliGRAM(s) Oral at bedtime  diVALproex  milliGRAM(s) Oral daily  doxycycline monohydrate Capsule 100 milliGRAM(s) Oral every 12 hours  levothyroxine 75 MICROGram(s) Oral daily  lurasidone 40 milliGRAM(s) Oral daily  melatonin 3 milliGRAM(s) Oral at bedtime  multivitamin 1 Tablet(s) Oral daily  pantoprazole    Tablet 40 milliGRAM(s) Oral before breakfast  polyethylene glycol 3350 17 Gram(s) Oral every 12 hours  QUEtiapine 200 milliGRAM(s) Oral two times a day  QUEtiapine 50 milliGRAM(s) Oral <User Schedule>  senna 2 Tablet(s) Oral at bedtime    MEDICATIONS  (PRN):  acetaminophen     Tablet .. 650 milliGRAM(s) Oral every 6 hours PRN Temp greater or equal to 38C (100.4F), Mild Pain (1 - 3)  ALBUTerol    90 MICROgram(s) HFA Inhaler 2 Puff(s) Inhalation every 6 hours PRN Shortness of Breath and/or Wheezing  chlorproMAZINE    IVPB 50 milliGRAM(s) IV Intermittent every 4 hours PRN agitation  diphenhydrAMINE 50 milliGRAM(s) Oral every 8 hours PRN Rash and/or Itching  diphenhydrAMINE Elixir 25 milliGRAM(s) Oral every 8 hours PRN Itching      OBJECTIVE:  Vital Signs Last 24 Hrs  T(C): 36.9 (2022 05:28), Max: 36.9 (2022 05:28)  T(F): 98.5 (2022 05:28), Max: 98.5 (2022 05:28)  HR: 84 (2022 05:28) (84 - 100)  BP: 103/61 (2022 05:28) (103/61 - 138/82)  BP(mean): --  RR: 16 (2022 05:28) (16 - 17)  SpO2: 98% (2022 05:28) (98% - 99%)    I&O's Summary      PHYSICAL EXAM:  GENERAL: Laying comfortably, NAD  HEENT: NCAT, PERRLA, EOMI, no scleral icterus, no LAD  NECK: No JVD  LUNG: CTABL; No wheezes, crackles, or rhonchi  HEART: RRR; normal S1/S2; No murmurs, rubs, or gallops  ABDOMEN: +BS, soft, nontender, nondistended, no HSM; No rebound, guarding, or rigidity  EXTREMITIES:  No LE edema b/l, 2+ Peripheral Pulses, No clubbing, cyanosis, or edema  NEUROLOGY: AOx3, non-focal, strength 5/5 in all extremities, sensation intact  PSYCH: calm and cooperative  SKIN: No rashes or lesions    LABS:                Urinalysis Basic - ( 2022 13:20 )    Color: Yellow / Appearance: Slightly Turbid / S.033 / pH: x  Gluc: x / Ketone: Trace  / Bili: Negative / Urobili: 3 mg/dL   Blood: x / Protein: 30 mg/dL / Nitrite: Negative   Leuk Esterase: Negative / RBC: 3 /HPF / WBC 3 /HPF   Sq Epi: x / Non Sq Epi: 9 /HPF / Bacteria: Occasional          CAPILLARY BLOOD GLUCOSE            RADIOLOGY & ADDITIONAL TESTS:      Imaging Personally Reviewed: [ ] YES  [ ] NO    Consultants involved in case:   Consultant(s) Notes Reviewed:  [ ] YES  [ ] NO:   Care Discussed with Consultants/Other Providers [ ] YES  [ ] NO ***************************************************************  Fredrick Doyle, PGY1  Internal Medicine   [facility] Pager [phone]  [facility] Pager [phone]  ***************************************************************    YODIT MORROW  37y  MRN: [id number]  22 (14d)    Patient is a 37y old  Female who presents with a chief complaint of L shoulder pain (2022 14:52)      SUBJECTIVE / OVERNIGHT EVENTS:   Overnight: Patient was attempting to elope last night and was refusing transportation to group home for discharge. After third code flight of the evening, patient was placed in 4-point restraints and given haldol IM 5mg and ativan IM 2mg for agitation, risk of harm to self/others, and risk of elopement. Psych was consulted and will be following up tomorrow.     Pt seen and examined at bedside. Pt reports feeling tired. Denies any suicidal ideation or homicidal ideation. Eager to return to group home. Refuses to answer other ROS questions.     12 Point ROS negative with the exception of the above    MEDICATIONS  (STANDING):  apixaban 5 milliGRAM(s) Oral every 12 hours  ARIPiprazole 30 milliGRAM(s) Oral daily  chlorhexidine 2% Cloths 1 Application(s) Topical daily  chlorproMAZINE    Tablet 50 milliGRAM(s) Oral <User Schedule>  diVALproex ER 1000 milliGRAM(s) Oral at bedtime  diVALproex  milliGRAM(s) Oral daily  doxycycline monohydrate Capsule 100 milliGRAM(s) Oral every 12 hours  levothyroxine 75 MICROGram(s) Oral daily  lurasidone 40 milliGRAM(s) Oral daily  melatonin 3 milliGRAM(s) Oral at bedtime  multivitamin 1 Tablet(s) Oral daily  pantoprazole    Tablet 40 milliGRAM(s) Oral before breakfast  polyethylene glycol 3350 17 Gram(s) Oral every 12 hours  QUEtiapine 200 milliGRAM(s) Oral two times a day  QUEtiapine 50 milliGRAM(s) Oral <User Schedule>  senna 2 Tablet(s) Oral at bedtime    MEDICATIONS  (PRN):  acetaminophen     Tablet .. 650 milliGRAM(s) Oral every 6 hours PRN Temp greater or equal to 38C (100.4F), Mild Pain (1 - 3)  ALBUTerol    90 MICROgram(s) HFA Inhaler 2 Puff(s) Inhalation every 6 hours PRN Shortness of Breath and/or Wheezing  chlorproMAZINE    IVPB 50 milliGRAM(s) IV Intermittent every 4 hours PRN agitation  diphenhydrAMINE 50 milliGRAM(s) Oral every 8 hours PRN Rash and/or Itching  diphenhydrAMINE Elixir 25 milliGRAM(s) Oral every 8 hours PRN Itching      OBJECTIVE:  Vital Signs Last 24 Hrs  T(C): 36.9 (2022 05:28), Max: 36.9 (2022 05:28)  T(F): 98.5 (2022 05:28), Max: 98.5 (2022 05:28)  HR: 84 (2022 05:28) (84 - 100)  BP: 103/61 (2022 05:28) (103/61 - 138/82)  BP(mean): --  RR: 16 (2022 05:28) (16 - 17)  SpO2: 98% (2022 05:28) (98% - 99%)    I&O's Summary      PHYSICAL EXAM:  **refuses physical exam**  GENERAL: Laying comfortably, NAD  HEENT: NCAT, EOMI, no scleral icterus  LUNG: breathing comfortably on room air, unable to assess further  HEART: no acute distress, RRR, unable to assess further due to refusal  ABDOMEN: unable to assess further due to refusal  EXTREMITIES:  moves limbs spontaneously, unable to assess further due to refusal  NEUROLOGY: AOx3, moves limbs spontaneously; unable to assess further due to refusal  PSYCH: calm, denies suicidality denies homicidality, tired  SKIN: No rashes or lesions    LABS:  **Refusing am labs**    Urinalysis Basic - ( 2022 13:20 )    Color: Yellow / Appearance: Slightly Turbid / S.033 / pH: x  Gluc: x / Ketone: Trace  / Bili: Negative / Urobili: 3 mg/dL   Blood: x / Protein: 30 mg/dL / Nitrite: Negative   Leuk Esterase: Negative / RBC: 3 /HPF / WBC 3 /HPF   Sq Epi: x / Non Sq Epi: 9 /HPF / Bacteria: Occasional      Consultants involved in case: psych, ID  Consultant(s) Notes Reviewed:  [X] YES  [ ] NO:   Care Discussed with Consultants/Other Providers [X] YES  [ ] NO

## 2022-07-02 NOTE — PROGRESS NOTE ADULT - PROBLEM SELECTOR PLAN 9
Diet: regular  DVT: c/w home eliqukandice    Case mangbi Carol: [phone]    Legal guardianship :   of Case Management Maria Elena Armijo: [phone]   Cliff Carlton Raza ([phone]), Diet: regular  DVT: c/w home eliquis    Case mangbi Carol: [phone]    Legal guardianship :   of Case Management Maria Elena Armijo: [phone]   Cliff Raza ([phone]),    Tried to reach out to  Joseluis without success today.

## 2022-07-02 NOTE — PROGRESS NOTE ADULT - ATTENDING COMMENTS
Patient is a 36 year old woman w/ past medical history of bipolar disorder, hypothyroidism, factor V leiden, LLE DVT (diagnosed March 2022, now on eliquis) presenting for new onset L shoulder pain found to have L deltoid abscess 2/2 to IM injections. Patient placed on IV clindamycin (6/18- 6/24), the IV vanc (6/25- 6/25) then PO clindamycin (6/25- ) given loss of IV access. IR consulted for abscess drainage planning drainage early this week. Switched Eliquis -> Lovenox for anticipated procedure, currently on hold for IR procedure 6/22. Procedure was postponed by IR as they could not consent her-- pt's guardian was unable to be reached 6/21, brother is now point of contact. IR again postponed, plans for procedure 6/24, IR again postponed-- was supposed to be Monday of this week. Repeat US shows persistence of abscess on 6/23. Discussing case w surgery 6/24, deferred to IR. Prior culture data reviewed, NGTD.     6/28 - Ongoing severe pain in the LUE. Now having regular BMs. Plan for I&D 6/29, NPO at midnight preprocedure. Hopeful d/c post operatively, will discuss antibiotic course w ID post op.    Nonactive issues:    Psych consulted. Rec's cont 1:1 given elopement risk. Meds as above.    Dysuria noted 6/20, now improved, UA significant for hematuria, though patient had told staff she was on her period. Now states she does not think this is the case. LMP 4 weeks ago. Repeat UA, w hematuria, no bacteria, no LE, no nitrites. CTM. Prior hx of hematuria as well, Urology consulted in the past, outpt f/u with them.    Dispo back to group home. Transitioning her medications to liquids as able (pt doesn't like swallowing pills/struggles with it.) **Need to avoid IM injections in the future given these back to back abscess in the deltoid w hemorrhagic component due to ongoing use of Eliquis. ** Will communicate this w outpt psych team as pt approaches dc.
Patient is a 36 year old woman w/ past medical history of bipolar disorder, hypothyroidism, factor V leiden, LLE DVT (diagnosed March 2022, now on eliquis) presenting for new onset L shoulder pain found to have L deltoid abscess 2/2 to IM injections. Patient placed on IV clindamycin (6/18- 6/24), the IV vanc (6/25- 6/25) then PO clindamycin (6/25- ) given loss of IV access. IR consulted for abscess drainage planning drainage early this week. Switched Eliquis -> Lovenox for anticipated procedure, currently on hold for IR procedure 6/22. Procedure was postponed by IR as they could not consent her-- pt's guardian was unable to be reached 6/21, brother is now point of contact. IR again postponed, plans for procedure 6/24, IR again postponed-- was supposed to be Monday of this week. Repeat US shows persistence of abscess on 6/23. Discussing case w surgery 6/24, deferred to IR. Prior culture data reviewed, NGTD.     6/30 - Lower abd pain w radiation to the L flank, new from yesterday. Lost IV access, transitioning her to oral antibiotics (doxycycline today). Will discuss plan moving forward w ID if not organisms identified from I&D yest. F/u repeat UA and culture. Urine appears dark w hematuria on exam today. Still using purewic.    Nonactive issues:    Psych consulted. Rec's cont 1:1 given elopement risk, d/c'd 6/28. Meds as above.    Dysuria noted 6/20, now improved, UA significant for hematuria, though patient had told staff she was on her period. Now states she does not think this is the case. LMP 4 weeks ago. Repeat UA, w hematuria, no bacteria, no LE, no nitrites. CTM. Prior hx of hematuria as well, Urology consulted in the past, outpt f/u with them.    Dispo back to group home. Transitioning her medications to liquids as able (pt doesn't like swallowing pills/struggles with it.) **Need to avoid IM injections in the future given these back to back abscess in the deltoid w hemorrhagic component due to ongoing use of Eliquis. ** Will communicate this w outpt psych team as pt approaches dc.
Patient seen and examined. Case discussed with the medical team on rounds. I agree with the findings and the plan above.    Patient is a 36 year old woman w/ past medical history of bipolar disorder, hypothyroidism, factor V leiden, LLE DVT (diagnosed March 2022, now on eliquis) presenting for new onset L shoulder pain found to have L deltoid abscess 2/2 to IM injections. Patient placed on IV clindamycin (6/18- 6/24), the IV vanc (6/25- 6/25) then PO clindamycin (6/25- ) given loss of IV access. IR consulted for abscess drainage planning drainage early this week. Switched Eliquis -> Lovenox for anticipated procedure, currently on hold for IR procedure 6/22. Procedure was postponed by IR as they could not consent her-- pt's guardian was unable to be reached 6/21, brother is now point of contact. IR again postponed, plans for procedure 6/24, IR again postponed-- was supposed to be Monday of this week. Repeat US shows persistence of abscess on 6/23. Discussing case w surgery 6/24, deferred to IR. Prior culture data reviewed, NGTD.     7/1 -  Discussed need to discontinue the narcotics. Pt states she was willing to try to go without them. Plan for doxycycline 5 days post dc to complete her abx course. Culture data NGTD.  Patient attempted to elope yesterday complicating disposition, psych to follow up today.
Patient is a 36 year old woman w/ past medical history of bipolar disorder, hypothyroidism, factor V leiden on eliquis presenting for new onset L shoulder pain found to have L deltoid abscess 2/2 to IM injections. Patient placed on IV clindamycin. IR consulted for abscess drainage awaiting recs. Switched Eliquis -> Lovenox for anticipated procedure. Patient notably asking for IV morphine and IV benadryl  (For itching on opiates) however very comfortable appearing. Will manage w/ po pain meds oxycodone 10mg prn and benadryl 50mg prn for itching. Dispo pending abscess management. Doppler ordered for lower extremity edema w/ hx of hypercoagulability. Psych consulted, meds per psych for her bipolar disorder.
Patient is a 36 year old woman w/ past medical history of bipolar disorder, hypothyroidism, factor V leiden on eliquis presenting for new onset L shoulder pain found to have L deltoid abscess 2/2 to IM injections. Patient placed on IV clindamycin. IR consulted for abscess drainage planning drainage early this week. Switched Eliquis -> Lovenox for anticipated procedure. Patient notably asking for IV morphine and IV benadryl  (For itching on opiates) however very comfortable appearing. Will manage w/ po pain meds oxycodone 10mg prn and benadryl 50mg prn for itching. Dispo pending abscess management. Doppler ordered for lower extremity edema w/ hx of hypercoagulability --> negative. Psych consulted, meds per psych for her bipolar disorder. C/o of dysuria today, UA ordered. Will treat if positive.
Patient is a 36 year old woman w/ past medical history of bipolar disorder, hypothyroidism, factor V leiden, LLE DVT (diagnosed March 2022, now on eliquis) presenting for new onset L shoulder pain found to have L deltoid abscess 2/2 to IM injections. Patient placed on IV clindamycin (6/18- 6/24), the IV vanc (6/25- 6/25) then PO clindamycin (6/25- ) given loss of IV access. IR consulted for abscess drainage planning drainage early this week. Switched Eliquis -> Lovenox for anticipated procedure, currently on hold for IR procedure 6/22. Procedure was postponed by IR as they could not consent her-- pt's guardian was unable to be reached 6/21, brother is now point of contact. IR again postponed, plans for procedure 6/24, IR again postponed-- was supposed to be Monday of this week. Repeat US shows persistence of abscess on 6/23. Discussing case w surgery 6/24, Ethics consulted given ongoing issues w consenting her for procedures. Prior culture data reviewed, NGTD.     6/25- Pt now w infiltrated IV, swelling of the RUE and hand w suspected thrombophlebitis. Unable to get labs today or give her her IV vancomycin. ID now following given inability to drain this abscess for over a week now despite eval by IR and EGS. Switched to oral clindamycin while awaiting midline. Cont therapuetic lovenox.    Psych consulted. Rec's cont 1:1 given elopement risk. Meds as above.    Dysuria noted 6/20, now improved, UA significant for hematuria, though patient had told staff she was on her period. Now states she does not think this is the case. LMP 4 weeks ago. Repeat UA, w hematuria, no bacteria, no LE, no nitrites. CTM. Prior hx of hematuria as well, Urology consulted in the past, outpt f/u with them.    Dispo back to group home, ideally 6/22, pending culture data. Transitioning her medications to liquids as able (pt doesn't like swallowing pills/struggles with it.) **Need to avoid IM injections in the future given these back to back abscess in the deltoid w hemorrhagic component due to ongoing use of Eliquis. ** Will communicate this w outpt psych team as pt approaches louise.
Patient is a 36 year old woman w/ past medical history of bipolar disorder, hypothyroidism, factor V leiden, LLE DVT (diagnosed March 2022, now on eliquis) presenting for new onset L shoulder pain found to have L deltoid abscess 2/2 to IM injections. Patient placed on IV clindamycin (6/18- 6/24), the IV vanc (6/25- 6/25) then PO clindamycin (6/25- ) given loss of IV access. IR consulted for abscess drainage planning drainage early this week. Switched Eliquis -> Lovenox for anticipated procedure, currently on hold for IR procedure 6/22. Procedure was postponed by IR as they could not consent her-- pt's guardian was unable to be reached 6/21, brother is now point of contact. IR again postponed, plans for procedure 6/24, IR again postponed-- was supposed to be Monday of this week. Repeat US shows persistence of abscess on 6/23. Discussing case w surgery 6/24, deferred to IR. Prior culture data reviewed, NGTD.     6/29 - Heading down to IR for I&D. Now having regular BMs, refusing miralax. Will need to monitor post op for sedation. DC in am. Discussing ora antibiotic regimen post op w ID.    Nonactive issues:    Psych consulted. Rec's cont 1:1 given elopement risk. Meds as above.    Dysuria noted 6/20, now improved, UA significant for hematuria, though patient had told staff she was on her period. Now states she does not think this is the case. LMP 4 weeks ago. Repeat UA, w hematuria, no bacteria, no LE, no nitrites. CTM. Prior hx of hematuria as well, Urology consulted in the past, outpt f/u with them.    Dispo back to group home. Transitioning her medications to liquids as able (pt doesn't like swallowing pills/struggles with it.) **Need to avoid IM injections in the future given these back to back abscess in the deltoid w hemorrhagic component due to ongoing use of Eliquis. ** Will communicate this w outpt psych team as pt approaches dc.
Patient is a 36 year old woman w/ past medical history of bipolar disorder, hypothyroidism, factor V leiden, LLE DVT (diagnosed March 2022, now on eliquis) presenting for new onset L shoulder pain found to have L deltoid abscess 2/2 to IM injections. Patient placed on IV clindamycin (6/18- 6/24), the IV vanc (6/25- 6/25) then PO clindamycin (6/25- ) given loss of IV access. IR consulted for abscess drainage planning drainage early this week. Switched Eliquis -> Lovenox for anticipated procedure, currently on hold for IR procedure 6/22. Procedure was postponed by IR as they could not consent her-- pt's guardian was unable to be reached 6/21, brother is now point of contact. IR again postponed, plans for procedure 6/24, IR again postponed-- was supposed to be Monday of this week. Repeat US shows persistence of abscess on 6/23. Discussing case w surgery 6/24, deferred to IR. Prior culture data reviewed, NGTD.     7/1 -  Discussed need to discontinue the narcotics. Pt states she was willing to try to go without them. Plan for doxycycline 5 days post dc to complete her abx course. Culture data NGTD.    Nonactive issues:    Psych consulted. Rec's cont 1:1 given elopement risk, d/c'd 6/28. Meds as above.    Dysuria noted 6/20, now improved, UA significant for hematuria, though patient had told staff she was on her period. Now states she does not think this is the case. LMP 4 weeks ago. Repeat UA, w hematuria, no bacteria, no LE, no nitrites. CTM. Prior hx of hematuria as well, Urology consulted in the past, outpt f/u with them.    Dispo back to group home. Transitioning her medications to liquids as able (pt doesn't like swallowing pills/struggles with it.) **Need to avoid IM injections in the future given these back to back abscess in the deltoid w hemorrhagic component due to ongoing use of Eliquis. ** Will communicate this w outpt psych team as pt approaches dc.
Patient is a 36 year old woman w/ past medical history of bipolar disorder, hypothyroidism, factor V leiden, LLE DVT (diagnosed March 2022, now on eliquis) presenting for new onset L shoulder pain found to have L deltoid abscess 2/2 to IM injections. Patient placed on IV clindamycin. IR consulted for abscess drainage planning drainage early this week. Switched Eliquis -> Lovenox for anticipated procedure, currently on hold for IR procedure 6/22. Procedure was postponed by IR as they could not consent her-- pt's guardian was unable to be reached 6/21, brother is now point of contact. IR again postponed, plans for procedure 6/24. Repeat US shows persistence of abscess on 6/23. Please send cultures. Prior culture data reviewed, NGTD. Resume Eliquis post procedure.     Patient historically asking for IV morphine and IV benadryl in the past noted. Currently requesting PO percocet.    Psych consulted, meds per psych for her bipolar disorder. Repeat EKG. Qtc 455 on 6/9 EKG.    Dysuria noted 6/20, now improved, UA significant for hematuria, though patient had told staff she was on her period. Now states she does not think this is the case. LMP 4 weeks ago. Repeat UA, w hematuria, no bacteria, no LE, no nitrites. CTM. Prior hx of hematuria as well, Urology consulted in the past, outpt f/u with them.    Dispo back to group home, ideally 6/22, pending culture data. Transitioning her medications to liquids as able (pt doesn't like swallowing pills/struggles with it.) **Need to avoid IM injections in the future given these back to back abscess in the deltoid w hemorrhagic component due to ongoing use of Eliquis. ** Will communicate this w outpt psych team as pt approaches dc.
Patient is a 36 year old woman w/ past medical history of bipolar disorder, hypothyroidism, factor V leiden (on eliquis) presenting for new onset L shoulder pain found to have L deltoid abscess 2/2 to IM injections. Patient placed on IV clindamycin. IR consulted for abscess drainage planning drainage early this week. Switched Eliquis -> Lovenox for anticipated procedure, currently on hold for IR procedure 6/22. Procedure was postponed by IR as they could not consent her-- pt's guardian was unable to be reached 6/21. Please send cultures. Prior culture data reviewed, NGTD. Resume Eliquis post procedure.     Patient historically asking for IV morphine and IV benadryl in the past noted. Currently requesting PO percocet.    Psych consulted, meds per psych for her bipolar disorder. Repeat EKG. Qtc 455 on 6/9 EKG.    Dysuria noted 6/20, now improved, UA significant for hematuria, though patient had told staff she was on her period. Now states she does not think this is the case. LMP 4 weeks ago. Repeat UA, w hematuria, no bacteria, no LE, no nitrites. CTM.     Dispo back to group home, ideally 6/22, pending culture data. Transitioning her medications to liquids as able (pt doesn't like swallowing pills/struggles with it.)
Patient is a 36 year old woman w/ past medical history of bipolar disorder, hypothyroidism, factor V leiden, LLE DVT (diagnosed March 2022, now on eliquis) presenting for new onset L shoulder pain found to have L deltoid abscess 2/2 to IM injections. Patient placed on IV clindamycin. IR consulted for abscess drainage planning drainage early this week. Switched Eliquis -> Lovenox for anticipated procedure, currently on hold for IR procedure 6/22. Procedure was postponed by IR as they could not consent her-- pt's guardian was unable to be reached 6/21, brother is now point of contact. IR again postponed, plans for procedure 6/24, IR again postponed-- was supposed to be Monday of this week. Repeat US shows persistence of abscess on 6/23. Discussing case w surgery 6/24. Prior culture data reviewed, NGTD. Pt seen and examined on 6/24    Psych consulted, meds per psych for her bipolar disorder. Repeat EKG. Qtc 455 on 6/9 EKG.    Dysuria noted 6/20, now improved, UA significant for hematuria, though patient had told staff she was on her period. Now states she does not think this is the case. LMP 4 weeks ago. Repeat UA, w hematuria, no bacteria, no LE, no nitrites. CTM. Prior hx of hematuria as well, Urology consulted in the past, outpt f/u with them.    Dispo back to group home, ideally 6/22, pending culture data. Transitioning her medications to liquids as able (pt doesn't like swallowing pills/struggles with it.) **Need to avoid IM injections in the future given these back to back abscess in the deltoid w hemorrhagic component due to ongoing use of Eliquis. ** Will communicate this w outpt psych team as pt approaches dc.
Patient is a 36 year old woman w/ past medical history of bipolar disorder, hypothyroidism, factor V leiden (on eliquis) presenting for new onset L shoulder pain found to have L deltoid abscess 2/2 to IM injections. Patient placed on IV clindamycin. IR consulted for abscess drainage planning drainage early this week. Switched Eliquis -> Lovenox for anticipated procedure, currently on hold for IR procedure 6/21. Please send cultures. Prior culture data reviewed, NGTD.    Patient notably asking for IV morphine and IV benadryl in the past noted.    Psych consulted, meds per psych for her bipolar disorder. Repeat EKG.    Dysuria noted, UA significant for hematuria, though patient had told staff she was on her period. Now states she does not think this is the case. LMP 4 weeks ago. Repeat UA, urine culture today.    Acute thrombocytopenia this am believed to be a laboma. F/u repeat labs this evening.
Patient is a 36 year old woman w/ past medical history of bipolar disorder, hypothyroidism, factor V leiden, LLE DVT (diagnosed March 2022, now on eliquis) presenting for new onset L shoulder pain found to have L deltoid abscess 2/2 to IM injections. Patient placed on IV clindamycin (6/18- 6/24), the IV vanc (6/25- 6/25) then PO clindamycin (6/25- ) given loss of IV access. IR consulted for abscess drainage planning drainage early this week. Switched Eliquis -> Lovenox for anticipated procedure, currently on hold for IR procedure 6/22. Procedure was postponed by IR as they could not consent her-- pt's guardian was unable to be reached 6/21, brother is now point of contact. IR again postponed, plans for procedure 6/24, IR again postponed-- was supposed to be Monday of this week. Repeat US shows persistence of abscess on 6/23. Discussing case w surgery 6/24, Ethics consulted given ongoing issues w consenting her for procedures. Prior culture data reviewed, NGTD.     6/27 -  Now able to resume IV vancomyin (on oral clinda over the weekend due to loss of IV access). Pt's arm notably more swollen, erythematous, TTP of the L deltoid. IR re-consulted, wants to get another CT to assess. Seems unnecessary since we saw persistence of the abscess on ultrasound on Friday, it still has not been I&D'd for over a week since initial diagnosis, and her exam clinically has worsened. Reticent about exposing a 37 yoF to unnecessary radiation given her history of multiple CT scans in the past year alone.     Nonactive issues:    Psych consulted. Rec's cont 1:1 given elopement risk. Meds as above.    Dysuria noted 6/20, now improved, UA significant for hematuria, though patient had told staff she was on her period. Now states she does not think this is the case. LMP 4 weeks ago. Repeat UA, w hematuria, no bacteria, no LE, no nitrites. CTM. Prior hx of hematuria as well, Urology consulted in the past, outpt f/u with them.    Dispo back to group home. Transitioning her medications to liquids as able (pt doesn't like swallowing pills/struggles with it.) **Need to avoid IM injections in the future given these back to back abscess in the deltoid w hemorrhagic component due to ongoing use of Eliquis. ** Will communicate this w outpt psych team as pt approaches dc.
Patient is a 36 year old woman w/ past medical history of bipolar disorder, hypothyroidism, factor V leiden (on eliquis) presenting for new onset L shoulder pain found to have L deltoid abscess 2/2 to IM injections. Patient placed on IV clindamycin. IR consulted for abscess drainage planning drainage early this week. Switched Eliquis -> Lovenox for anticipated procedure, currently on hold for IR procedure 6/21. Please send cultures. Prior culture data reviewed, NGTD. Resume Eliquis post procedure.     Patient notably asking for IV morphine and IV benadryl in the past noted.    Psych consulted, meds per psych for her bipolar disorder. Repeat EKG.    Dysuria noted 6/20, now improved, UA significant for hematuria, though patient had told staff she was on her period. Now states she does not think this is the case. LMP 4 weeks ago. Repeat UA, w hematuria, no bacteria, no LE, no nitrites. CTM.     Dispo back to group home, ideally 6/22, pending culture data.
Patient is a 36 year old woman w/ past medical history of bipolar disorder, hypothyroidism, factor V leiden, LLE DVT (diagnosed March 2022, now on eliquis) presenting for new onset L shoulder pain found to have L deltoid abscess 2/2 to IM injections. Patient placed on IV clindamycin (6/18- 6/24), the IV vanc (6/25- 6/25) then PO clindamycin (6/25- ) given loss of IV access. IR consulted for abscess drainage planning drainage early this week. Switched Eliquis -> Lovenox for anticipated procedure, currently on hold for IR procedure 6/22. Procedure was postponed by IR as they could not consent her-- pt's guardian was unable to be reached 6/21, brother is now point of contact. IR again postponed, plans for procedure 6/24, IR again postponed-- was supposed to be Monday of this week. Repeat US shows persistence of abscess on 6/23. Discussing case w surgery 6/24, Ethics consulted given ongoing issues w consenting her for procedures. Prior culture data reviewed, NGTD.     6/26 - Still no IV access, no labs. consult for midline in am. Pt HDS. ID now following given inability to drain this abscess for over a week now despite eval by IR and EGS. Switched to oral clindamycin while awaiting midline. Cont therapuetic lovenox.    Psych consulted. Rec's cont 1:1 given elopement risk. Meds as above.    Dysuria noted 6/20, now improved, UA significant for hematuria, though patient had told staff she was on her period. Now states she does not think this is the case. LMP 4 weeks ago. Repeat UA, w hematuria, no bacteria, no LE, no nitrites. CTM. Prior hx of hematuria as well, Urology consulted in the past, outpt f/u with them.    Dispo back to group home, ideally 6/22, pending culture data. Transitioning her medications to liquids as able (pt doesn't like swallowing pills/struggles with it.) **Need to avoid IM injections in the future given these back to back abscess in the deltoid w hemorrhagic component due to ongoing use of Eliquis. ** Will communicate this w outpt psych team as pt approaches dc.

## 2022-07-02 NOTE — PROGRESS NOTE ADULT - PROBLEM SELECTOR PLAN 4
Pt had episodic dysuria 6/26  - UA 6/26 wnl  - Ucx 6/26 neg  - UA 6/30 - proteinuria, slight ketonuria, turbid, occasional bacteria; mucous present

## 2022-07-02 NOTE — PROGRESS NOTE ADULT - PROBLEM SELECTOR PLAN 5
- c/w synthroid

## 2022-07-03 NOTE — ED ADULT NURSE NOTE - NSHOSCREENINGQ1_ED_ALL_ED
[facility] [place] [facility]  Cardiology  Progress Note    Patient Name: Reed Torres  MRN: [id number]  Admission Date: 7/2/2022  Hospital Length of Stay: 1 days  Code Status: Full Code   Attending Physician: Enzo Trejo MD   Primary Care Physician: Pj Gillette MD  Expected Discharge Date:   Principal Problem:PAF (paroxysmal atrial fibrillation)    Subjective:     Hospital Course:   7/3/22 Converted yesterday to sinus bradycardia 50s - amiodarone changed to po. Denies CP or SOB. Wants to go home    Echo 7/2/22  · The left ventricle is normal in size with normal systolic function.  · The estimated ejection fraction is 60%.  · Normal left ventricular diastolic function.  · Normal right ventricular size with normal right ventricular systolic function.        Interval History:     Review of Systems   Constitutional: Negative for decreased appetite.   HENT:  Negative for ear discharge.    Eyes:  Negative for blurred vision.   Endocrine: Negative for polyphagia.   Skin:  Negative for nail changes.   Genitourinary:  Negative for bladder incontinence.   Neurological:  Negative for aphonia.   Psychiatric/Behavioral:  Negative for hallucinations.    Allergic/Immunologic: Negative for hives.   Objective:     Vital Signs (Most Recent):  Temp: 97.9 °F (36.6 °C) (07/03/22 0701)  Pulse: (!) 56 (07/03/22 1045)  Resp: (!) 28 (07/03/22 1045)  BP: 119/77 (07/03/22 1030)  SpO2: 97 % (07/03/22 1045)   Vital Signs (24h Range):  Temp:  [97.7 °F (36.5 °C)-98.1 °F (36.7 °C)] 97.9 °F (36.6 °C)  Pulse:  [46-77] 56  Resp:  [11-35] 28  SpO2:  [92 %-100 %] 97 %  BP: ()/(57-93) 119/77     Weight: 58.9 kg (129 lb 13.6 oz)  Body mass index is 21.61 kg/m².     SpO2: 97 %  O2 Device (Oxygen Therapy): room air      Intake/Output Summary (Last 24 hours) at 7/3/2022 1100  Last data filed at 7/2/2022 1546  Gross per 24 hour   Intake 659.11 ml   Output 575 ml   Net 84.11 ml       Lines/Drains/Airways       Peripheral Intravenous Line  Duration                   Peripheral IV - Single Lumen 07/02/22 0607 20 G Right Antecubital 1 day         Peripheral IV - Single Lumen 07/02/22 0625 20 G Left Antecubital 1 day                    Physical Exam  Constitutional:       Appearance: He is well-developed.   HENT:      Head: Normocephalic and atraumatic.   Eyes:      Conjunctiva/sclera: Conjunctivae normal.      Pupils: Pupils are equal, round, and reactive to light.   Cardiovascular:      Rate and Rhythm: Bradycardia present.      Pulses: Intact distal pulses.      Heart sounds: Normal heart sounds.   Pulmonary:      Effort: Pulmonary effort is normal.      Breath sounds: Normal breath sounds.   Abdominal:      General: Bowel sounds are normal.      Palpations: Abdomen is soft.   Musculoskeletal:         General: Normal range of motion.      Cervical back: Normal range of motion and neck supple.   Skin:     General: Skin is warm and dry.   Neurological:      Mental Status: He is alert and oriented to person, place, and time.       Significant Labs: All pertinent lab results from the last 24 hours have been reviewed.    Significant Imaging: Echocardiogram: 2D echo with color flow doppler: No results found for this or any previous visit.    Assessment and Plan:     Brief HPI:     * PAF (paroxysmal atrial fibrillation)  Hx PAF on ASA 81 qd. Rates improved with IV amiodarone. Full dose lovenox with transition to DOAC if tolerated. May not tolerated BB with low BP. Check echo    7/3/22 Back in [facility]. On po amiodarone 200 qd. Change lovenox to eliquis. Echo with good EF. Ok for d/c. OV with Dr Avila 1 week    Coronary artery disease due to lipid rich plaque  Non-obstructive CAD by [facility] 7/4/20. CP atypical. No MI    Hyperlipidemia  On statin        VTE Risk Mitigation (From admission, onward)         Ordered     apixaban tablet 5 mg  2 times daily         07/03/22 5250                Guillaume Rojas MD  Cardiology  [facility] [place] - Intensive Care   No

## 2022-07-04 ENCOUNTER — EMERGENCY (EMERGENCY)
Facility: HOSPITAL | Age: 37
LOS: 1 days | Discharge: HOME | End: 2022-07-04
Attending: EMERGENCY MEDICINE | Admitting: EMERGENCY MEDICINE

## 2022-07-04 VITALS
RESPIRATION RATE: 16 BRPM | HEART RATE: 85 BPM | HEIGHT: 68 IN | OXYGEN SATURATION: 100 % | DIASTOLIC BLOOD PRESSURE: 74 MMHG | SYSTOLIC BLOOD PRESSURE: 124 MMHG | TEMPERATURE: 99 F

## 2022-07-04 PROCEDURE — 99285 EMERGENCY DEPT VISIT HI MDM: CPT | Mod: 25

## 2022-07-04 PROCEDURE — 36000 PLACE NEEDLE IN VEIN: CPT

## 2022-07-04 RX ORDER — OXYCODONE HYDROCHLORIDE 5 MG/1
5 TABLET ORAL ONCE
Refills: 0 | Status: DISCONTINUED | OUTPATIENT
Start: 2022-07-04 | End: 2022-07-04

## 2022-07-04 RX ORDER — ACETAMINOPHEN 500 MG
650 TABLET ORAL ONCE
Refills: 0 | Status: COMPLETED | OUTPATIENT
Start: 2022-07-04 | End: 2022-07-04

## 2022-07-04 RX ORDER — DEXAMETHASONE 0.5 MG/5ML
6 ELIXIR ORAL ONCE
Refills: 0 | Status: COMPLETED | OUTPATIENT
Start: 2022-07-04 | End: 2022-07-04

## 2022-07-04 RX ADMIN — OXYCODONE HYDROCHLORIDE 5 MILLIGRAM(S): 5 TABLET ORAL at 23:43

## 2022-07-04 RX ADMIN — Medication 650 MILLIGRAM(S): at 23:43

## 2022-07-04 NOTE — ED PROVIDER NOTE - PHYSICAL EXAMINATION
CONSTITUTIONAL: Well-appearing; well-nourished; in no apparent distress. Non-toxic appearing.   NEURO: Alert & oriented. Gait steady without assistance. Sensory and motor functions are grossly intact.  PSYCH: Mood appropriate. Thought processes intact.   NECK: Supple  CARD: Regular rate and rhythm, no murmurs  RESP: No accessory muscle use; breath sounds clear and equal bilaterally; no wheezes, rhonchi, or rales     ABD: Soft; non-distended; non-tender.   MUSCULOSKELETAL/EXTREMITIES: FROM in all four extremities; no extremity edema.  BACK: No overlying rash or ecchymosis noted. There is tenderness to right SI joint to palpation with (+) straight leg raise on the right.   SKIN: Warm; dry; no apparent lesions or exudate
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
## 2022-07-04 NOTE — ED PROVIDER NOTE - PROGRESS NOTE DETAILS
patient reassessed, pain improved.  will await nonemergent transport Dr. Dela Cruz: pt was awaiting transport after ED visit that began last night, noted to staff that she felt like she was going to have seizure, then had altered consciousness, tachypnea and truncal twitching movement, lasted over 10 minutes with incomplete return to baseline mental status; pt was moved from Intake where this began to red [facility], where I discussed case with team that note pt begin to seize; pt arrived with airway intact but unresponsive, s/p lorazepam x 3 with continued seizure activity; initial plan was to intubate for status epilepticus, but during preparation pt began to cry and became awake, answering my questions; decision made not to intubate at this time, will give Keppra 3 gm for status epilepticus (?now resolved); neurology consulted and at bedside examining pt, agree with plan; will send VPA level to check because pt takes at home, but states that she did not take last night before coming to ED

## 2022-07-04 NOTE — ED PROVIDER NOTE - OBJECTIVE STATEMENT
Pt is a 38 y/o female w/ PMH of asthma, bipolar d/o Endometriosis, Factor V Leiden, GERD, hypothyroid, Insomnia, Seizure p/w worsening of known lower back pain worsening since yesterday. Pt states that she has back pain since falling of the bed 2 years from having seizure. Yesterday, Pt went outside to get something to eat, started have acute worsening of back pain. Denies leg numbness, leg tingling, abdominal pain. Admits to having urinary incontinence since last month while denying hematuria, dysuria, and polyuria.    Of note, Pt is allergic tramadol, Zofran, penicillin, morphine. Admits to being on blood thinner. Pt is a 38 y/o female w/ PMH of asthma, bipolar d/o Endometriosis, Factor V Leiden, GERD, hypothyroid, Insomnia, Seizure, discharged 7/1/22 for shoulder abscess with IR drainage p/w worsening of known lower back pain worsening since yesterday. Pt states that she has back pain since falling of the bed 2 years from having seizure. Yesterday, Pt went outside to get something to eat, started have acute worsening of back pain. Denies leg numbness, leg tingling, abdominal pain. Admits to having trouble urinating x1 month while denying hematuria, dysuria, and polyuria.    Of note, Pt is allergic tramadol, Zofran, penicillin, morphine. Admits to being on blood thinner.

## 2022-07-04 NOTE — ED PROVIDER NOTE - CLINICAL SUMMARY MEDICAL DECISION MAKING FREE TEXT BOX
Pt is a 38 y/o female w/ PMH of asthma, bipolar d/o Endometriosis, Factor V Leiden, GERD, hypothyroid, Insomnia, Seizure p/w worsening of known lower back pain worsening since yesterday.   Sciatica vs. UTI.  UA, UCx to r/o UTI  Will refer to Outpatient Orthopedic follow-up if tests unremarkable for MRI.
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
## 2022-07-04 NOTE — ED PROVIDER NOTE - NSFOLLOWUPINSTRUCTIONS_ED_ALL_ED_FT
You were evaluated in the Emergency Department today for your back pain. Your evaluation did not show signs of medical conditions requiring emergent intervention at this time.    We recommend you take 400mg ibuprofen every 8 hours or tylenol 650mg every 6 hours as needed for pain. If needed, you can alternate these medications so that you take one medication every 3 hours. For instance, at noon take ibuprofen, then at 3pm take tylenol, then at 6pm take ibuprofen.  Due to your eliquis you should only take ibuprofen for a maximum of 2 days.    You may also use a lidocaine patch (available over the counter as brand Salonpas, and others) for pain control.  You should use this as directed on the box.     Please schedule an appointment for follow-up with your primary care physicianin 1-3 days for further evaluation of your symptoms.    Return to the Emergency Department if you experience worsening back pain, difficulty walking, fevers, numbness, tingling, incontinence, or any other concerning symptoms.    Thank you for choosing us for your care.

## 2022-07-04 NOTE — ED ADULT NURSE NOTE - OBJECTIVE STATEMENT
alert oriented c/o severe low back pain with numbness radiating down right leg  no c/o CP dizziness or SOB

## 2022-07-04 NOTE — ED PROVIDER NOTE - PATIENT PORTAL LINK FT
You can access the FollowMyHealth Patient Portal offered by [facility] by registering at the following website: [email or web address]. By joining [employer]’s FollowMyHealth portal, you will also be able to view your health information using other applications (apps) compatible with our system. You can access the FollowMyHealth Patient Portal offered by [facility] by registering at the following website: [email or web address]. By joining [employer]’s FollowMyHealth portal, you will also be able to view your health information using other applications (apps) compatible with our system.

## 2022-07-05 ENCOUNTER — INPATIENT (INPATIENT)
Facility: HOSPITAL | Age: 37
LOS: 2 days | Discharge: ANOTHER HOSPITAL | End: 2022-07-08
Attending: HOSPITALIST | Admitting: HOSPITALIST

## 2022-07-05 VITALS
RESPIRATION RATE: 20 BRPM | OXYGEN SATURATION: 100 % | DIASTOLIC BLOOD PRESSURE: 83 MMHG | SYSTOLIC BLOOD PRESSURE: 124 MMHG | HEART RATE: 63 BPM

## 2022-07-05 VITALS
DIASTOLIC BLOOD PRESSURE: 87 MMHG | OXYGEN SATURATION: 100 % | HEART RATE: 96 BPM | RESPIRATION RATE: 19 BRPM | TEMPERATURE: 99 F | SYSTOLIC BLOOD PRESSURE: 126 MMHG

## 2022-07-05 DIAGNOSIS — F31.9 BIPOLAR DISORDER, UNSPECIFIED: ICD-10-CM

## 2022-07-05 DIAGNOSIS — E03.9 HYPOTHYROIDISM, UNSPECIFIED: ICD-10-CM

## 2022-07-05 DIAGNOSIS — G40.901 EPILEPSY, UNSPECIFIED, NOT INTRACTABLE, WITH STATUS EPILEPTICUS: ICD-10-CM

## 2022-07-05 DIAGNOSIS — D68.51 ACTIVATED PROTEIN C RESISTANCE: ICD-10-CM

## 2022-07-05 DIAGNOSIS — Z29.9 ENCOUNTER FOR PROPHYLACTIC MEASURES, UNSPECIFIED: ICD-10-CM

## 2022-07-05 LAB
ALBUMIN SERPL ELPH-MCNC: 4.4 G/DL — SIGNIFICANT CHANGE UP (ref 3.3–5)
ALBUMIN SERPL ELPH-MCNC: 4.7 G/DL — SIGNIFICANT CHANGE UP (ref 3.3–5)
ALP SERPL-CCNC: 48 U/L — SIGNIFICANT CHANGE UP (ref 40–120)
ALP SERPL-CCNC: 52 U/L — SIGNIFICANT CHANGE UP (ref 40–120)
ALT FLD-CCNC: 25 U/L — SIGNIFICANT CHANGE UP (ref 4–33)
ALT FLD-CCNC: 32 U/L — SIGNIFICANT CHANGE UP (ref 4–33)
ANION GAP SERPL CALC-SCNC: 12 MMOL/L — SIGNIFICANT CHANGE UP (ref 7–14)
ANION GAP SERPL CALC-SCNC: 14 MMOL/L — SIGNIFICANT CHANGE UP (ref 7–14)
APPEARANCE UR: CLEAR — SIGNIFICANT CHANGE UP
AST SERPL-CCNC: 22 U/L — SIGNIFICANT CHANGE UP (ref 4–32)
AST SERPL-CCNC: 28 U/L — SIGNIFICANT CHANGE UP (ref 4–32)
B PERT DNA SPEC QL NAA+PROBE: SIGNIFICANT CHANGE UP
B PERT+PARAPERT DNA PNL SPEC NAA+PROBE: SIGNIFICANT CHANGE UP
BACTERIA # UR AUTO: ABNORMAL
BASOPHILS # BLD AUTO: 0 K/UL — SIGNIFICANT CHANGE UP (ref 0–0.2)
BASOPHILS # BLD AUTO: 0.02 K/UL — SIGNIFICANT CHANGE UP (ref 0–0.2)
BASOPHILS NFR BLD AUTO: 0 % — SIGNIFICANT CHANGE UP (ref 0–2)
BASOPHILS NFR BLD AUTO: 0.3 % — SIGNIFICANT CHANGE UP (ref 0–2)
BILIRUB SERPL-MCNC: 0.2 MG/DL — SIGNIFICANT CHANGE UP (ref 0.2–1.2)
BILIRUB SERPL-MCNC: 0.2 MG/DL — SIGNIFICANT CHANGE UP (ref 0.2–1.2)
BILIRUB UR-MCNC: NEGATIVE — SIGNIFICANT CHANGE UP
BLOOD GAS VENOUS COMPREHENSIVE RESULT: SIGNIFICANT CHANGE UP
BORDETELLA PARAPERTUSSIS (RAPRVP): SIGNIFICANT CHANGE UP
BUN SERPL-MCNC: 12 MG/DL — SIGNIFICANT CHANGE UP (ref 7–23)
BUN SERPL-MCNC: 13 MG/DL — SIGNIFICANT CHANGE UP (ref 7–23)
C PNEUM DNA SPEC QL NAA+PROBE: SIGNIFICANT CHANGE UP
CALCIUM SERPL-MCNC: 9.3 MG/DL — SIGNIFICANT CHANGE UP (ref 8.4–10.5)
CALCIUM SERPL-MCNC: 9.9 MG/DL — SIGNIFICANT CHANGE UP (ref 8.4–10.5)
CHLORIDE SERPL-SCNC: 105 MMOL/L — SIGNIFICANT CHANGE UP (ref 98–107)
CHLORIDE SERPL-SCNC: 105 MMOL/L — SIGNIFICANT CHANGE UP (ref 98–107)
CO2 SERPL-SCNC: 23 MMOL/L — SIGNIFICANT CHANGE UP (ref 22–31)
CO2 SERPL-SCNC: 25 MMOL/L — SIGNIFICANT CHANGE UP (ref 22–31)
COLOR SPEC: YELLOW — SIGNIFICANT CHANGE UP
COMMENT - URINE: SIGNIFICANT CHANGE UP
CREAT SERPL-MCNC: 0.83 MG/DL — SIGNIFICANT CHANGE UP (ref 0.5–1.3)
CREAT SERPL-MCNC: 0.89 MG/DL — SIGNIFICANT CHANGE UP (ref 0.5–1.3)
CRP SERPL-MCNC: <3 MG/L — SIGNIFICANT CHANGE UP
DIFF PNL FLD: NEGATIVE — SIGNIFICANT CHANGE UP
EGFR: 87 ML/MIN/1.73M2 — SIGNIFICANT CHANGE UP
EGFR: 94 ML/MIN/1.73M2 — SIGNIFICANT CHANGE UP
EOSINOPHIL # BLD AUTO: 0 K/UL — SIGNIFICANT CHANGE UP (ref 0–0.5)
EOSINOPHIL # BLD AUTO: 0.01 K/UL — SIGNIFICANT CHANGE UP (ref 0–0.5)
EOSINOPHIL NFR BLD AUTO: 0 % — SIGNIFICANT CHANGE UP (ref 0–6)
EOSINOPHIL NFR BLD AUTO: 0.1 % — SIGNIFICANT CHANGE UP (ref 0–6)
EPI CELLS # UR: ABNORMAL
ERYTHROCYTE [SEDIMENTATION RATE] IN BLOOD: 19 MM/HR — SIGNIFICANT CHANGE UP (ref 4–25)
FLUAV SUBTYP SPEC NAA+PROBE: SIGNIFICANT CHANGE UP
FLUBV RNA SPEC QL NAA+PROBE: SIGNIFICANT CHANGE UP
GLUCOSE SERPL-MCNC: 121 MG/DL — HIGH (ref 70–99)
GLUCOSE SERPL-MCNC: 83 MG/DL — SIGNIFICANT CHANGE UP (ref 70–99)
GLUCOSE UR QL: NEGATIVE — SIGNIFICANT CHANGE UP
HADV DNA SPEC QL NAA+PROBE: SIGNIFICANT CHANGE UP
HCOV 229E RNA SPEC QL NAA+PROBE: SIGNIFICANT CHANGE UP
HCOV HKU1 RNA SPEC QL NAA+PROBE: SIGNIFICANT CHANGE UP
HCOV NL63 RNA SPEC QL NAA+PROBE: SIGNIFICANT CHANGE UP
HCOV OC43 RNA SPEC QL NAA+PROBE: SIGNIFICANT CHANGE UP
HCT VFR BLD CALC: 32.5 % — LOW (ref 34.5–45)
HCT VFR BLD CALC: 37.8 % — SIGNIFICANT CHANGE UP (ref 34.5–45)
HGB BLD-MCNC: 10.8 G/DL — LOW (ref 11.5–15.5)
HGB BLD-MCNC: 11.7 G/DL — SIGNIFICANT CHANGE UP (ref 11.5–15.5)
HMPV RNA SPEC QL NAA+PROBE: SIGNIFICANT CHANGE UP
HPIV1 RNA SPEC QL NAA+PROBE: SIGNIFICANT CHANGE UP
HPIV2 RNA SPEC QL NAA+PROBE: SIGNIFICANT CHANGE UP
HPIV3 RNA SPEC QL NAA+PROBE: SIGNIFICANT CHANGE UP
HPIV4 RNA SPEC QL NAA+PROBE: SIGNIFICANT CHANGE UP
HYALINE CASTS # UR AUTO: 0 /LPF — SIGNIFICANT CHANGE UP (ref 0–7)
IANC: 4.08 K/UL — SIGNIFICANT CHANGE UP (ref 1.8–7.4)
IANC: 4.97 K/UL — SIGNIFICANT CHANGE UP (ref 1.8–7.4)
IMM GRANULOCYTES NFR BLD AUTO: 0.5 % — SIGNIFICANT CHANGE UP (ref 0–1.5)
IMM GRANULOCYTES NFR BLD AUTO: 0.6 % — SIGNIFICANT CHANGE UP (ref 0–1.5)
KETONES UR-MCNC: ABNORMAL
LEUKOCYTE ESTERASE UR-ACNC: NEGATIVE — SIGNIFICANT CHANGE UP
LYMPHOCYTES # BLD AUTO: 0.76 K/UL — LOW (ref 1–3.3)
LYMPHOCYTES # BLD AUTO: 13.1 % — SIGNIFICANT CHANGE UP (ref 13–44)
LYMPHOCYTES # BLD AUTO: 2.3 K/UL — SIGNIFICANT CHANGE UP (ref 1–3.3)
LYMPHOCYTES # BLD AUTO: 32.6 % — SIGNIFICANT CHANGE UP (ref 13–44)
M PNEUMO DNA SPEC QL NAA+PROBE: SIGNIFICANT CHANGE UP
MCHC RBC-ENTMCNC: 29.6 PG — SIGNIFICANT CHANGE UP (ref 27–34)
MCHC RBC-ENTMCNC: 29.8 PG — SIGNIFICANT CHANGE UP (ref 27–34)
MCHC RBC-ENTMCNC: 31 GM/DL — LOW (ref 32–36)
MCHC RBC-ENTMCNC: 33.2 GM/DL — SIGNIFICANT CHANGE UP (ref 32–36)
MCV RBC AUTO: 89.8 FL — SIGNIFICANT CHANGE UP (ref 80–100)
MCV RBC AUTO: 93.8 FL — SIGNIFICANT CHANGE UP (ref 80–100)
MONOCYTES # BLD AUTO: 0.06 K/UL — SIGNIFICANT CHANGE UP (ref 0–0.9)
MONOCYTES # BLD AUTO: 0.61 K/UL — SIGNIFICANT CHANGE UP (ref 0–0.9)
MONOCYTES NFR BLD AUTO: 1 % — LOW (ref 2–14)
MONOCYTES NFR BLD AUTO: 8.6 % — SIGNIFICANT CHANGE UP (ref 2–14)
NEUTROPHILS # BLD AUTO: 4.08 K/UL — SIGNIFICANT CHANGE UP (ref 1.8–7.4)
NEUTROPHILS # BLD AUTO: 4.97 K/UL — SIGNIFICANT CHANGE UP (ref 1.8–7.4)
NEUTROPHILS NFR BLD AUTO: 57.8 % — SIGNIFICANT CHANGE UP (ref 43–77)
NEUTROPHILS NFR BLD AUTO: 85.4 % — HIGH (ref 43–77)
NITRITE UR-MCNC: NEGATIVE — SIGNIFICANT CHANGE UP
NRBC # BLD: 0 /100 WBCS — SIGNIFICANT CHANGE UP
NRBC # BLD: 0 /100 WBCS — SIGNIFICANT CHANGE UP
NRBC # FLD: 0 K/UL — SIGNIFICANT CHANGE UP
NRBC # FLD: 0.04 K/UL — HIGH
PCP SPEC-MCNC: SIGNIFICANT CHANGE UP
PH UR: 7 — SIGNIFICANT CHANGE UP (ref 5–8)
PLATELET # BLD AUTO: 165 K/UL — SIGNIFICANT CHANGE UP (ref 150–400)
PLATELET # BLD AUTO: 179 K/UL — SIGNIFICANT CHANGE UP (ref 150–400)
POTASSIUM SERPL-MCNC: 4.2 MMOL/L — SIGNIFICANT CHANGE UP (ref 3.5–5.3)
POTASSIUM SERPL-MCNC: 4.4 MMOL/L — SIGNIFICANT CHANGE UP (ref 3.5–5.3)
POTASSIUM SERPL-SCNC: 4.2 MMOL/L — SIGNIFICANT CHANGE UP (ref 3.5–5.3)
POTASSIUM SERPL-SCNC: 4.4 MMOL/L — SIGNIFICANT CHANGE UP (ref 3.5–5.3)
PROT SERPL-MCNC: 7.4 G/DL — SIGNIFICANT CHANGE UP (ref 6–8.3)
PROT SERPL-MCNC: 8.9 G/DL — HIGH (ref 6–8.3)
PROT UR-MCNC: ABNORMAL
RAPID RVP RESULT: SIGNIFICANT CHANGE UP
RBC # BLD: 3.62 M/UL — LOW (ref 3.8–5.2)
RBC # BLD: 3.95 M/UL — SIGNIFICANT CHANGE UP (ref 3.8–5.2)
RBC # FLD: 15.2 % — HIGH (ref 10.3–14.5)
RBC # FLD: 15.3 % — HIGH (ref 10.3–14.5)
RBC CASTS # UR COMP ASSIST: 1 /HPF — SIGNIFICANT CHANGE UP (ref 0–4)
RSV RNA SPEC QL NAA+PROBE: SIGNIFICANT CHANGE UP
RV+EV RNA SPEC QL NAA+PROBE: SIGNIFICANT CHANGE UP
SARS-COV-2 RNA SPEC QL NAA+PROBE: SIGNIFICANT CHANGE UP
SODIUM SERPL-SCNC: 142 MMOL/L — SIGNIFICANT CHANGE UP (ref 135–145)
SODIUM SERPL-SCNC: 142 MMOL/L — SIGNIFICANT CHANGE UP (ref 135–145)
SP GR SPEC: 1.04 — SIGNIFICANT CHANGE UP (ref 1–1.05)
TOXICOLOGY SCREEN, DRUGS OF ABUSE, SERUM RESULT: SIGNIFICANT CHANGE UP
UROBILINOGEN FLD QL: ABNORMAL
VALPROATE SERPL-MCNC: 50.5 UG/ML — SIGNIFICANT CHANGE UP (ref 50–100)
VALPROATE SERPL-MCNC: 57.9 UG/ML — SIGNIFICANT CHANGE UP (ref 50–100)
WBC # BLD: 5.82 K/UL — SIGNIFICANT CHANGE UP (ref 3.8–10.5)
WBC # BLD: 7.06 K/UL — SIGNIFICANT CHANGE UP (ref 3.8–10.5)
WBC # FLD AUTO: 5.82 K/UL — SIGNIFICANT CHANGE UP (ref 3.8–10.5)
WBC # FLD AUTO: 7.06 K/UL — SIGNIFICANT CHANGE UP (ref 3.8–10.5)
WBC UR QL: 2 /HPF — SIGNIFICANT CHANGE UP (ref 0–5)

## 2022-07-05 PROCEDURE — 72141 MRI NECK SPINE W/O DYE: CPT | Mod: 26

## 2022-07-05 PROCEDURE — 99223 1ST HOSP IP/OBS HIGH 75: CPT | Mod: GC

## 2022-07-05 PROCEDURE — 72146 MRI CHEST SPINE W/O DYE: CPT | Mod: 26

## 2022-07-05 PROCEDURE — 70450 CT HEAD/BRAIN W/O DYE: CPT | Mod: 26,MA

## 2022-07-05 PROCEDURE — 99291 CRITICAL CARE FIRST HOUR: CPT

## 2022-07-05 PROCEDURE — 72148 MRI LUMBAR SPINE W/O DYE: CPT | Mod: 26

## 2022-07-05 RX ORDER — LEVETIRACETAM 250 MG/1
3000 TABLET, FILM COATED ORAL ONCE
Refills: 0 | Status: DISCONTINUED | OUTPATIENT
Start: 2022-07-05 | End: 2022-07-08

## 2022-07-05 RX ORDER — LANOLIN ALCOHOL/MO/W.PET/CERES
3 CREAM (GRAM) TOPICAL AT BEDTIME
Refills: 0 | Status: DISCONTINUED | OUTPATIENT
Start: 2022-07-05 | End: 2022-07-05

## 2022-07-05 RX ORDER — PHENOBARBITAL 60 MG
1500 TABLET ORAL ONCE
Refills: 0 | Status: DISCONTINUED | OUTPATIENT
Start: 2022-07-05 | End: 2022-07-05

## 2022-07-05 RX ORDER — ARIPIPRAZOLE 15 MG/1
30 TABLET ORAL DAILY
Refills: 0 | Status: DISCONTINUED | OUTPATIENT
Start: 2022-07-05 | End: 2022-07-08

## 2022-07-05 RX ORDER — IBUPROFEN 200 MG
1 TABLET ORAL
Qty: 6 | Refills: 0
Start: 2022-07-05 | End: 2022-07-06

## 2022-07-05 RX ORDER — LEVOTHYROXINE SODIUM 125 MCG
75 TABLET ORAL DAILY
Refills: 0 | Status: DISCONTINUED | OUTPATIENT
Start: 2022-07-05 | End: 2022-07-08

## 2022-07-05 RX ORDER — VALPROIC ACID (AS SODIUM SALT) 250 MG/5ML
1000 SOLUTION, ORAL ORAL
Refills: 0 | Status: DISCONTINUED | OUTPATIENT
Start: 2022-07-05 | End: 2022-07-05

## 2022-07-05 RX ORDER — LANOLIN ALCOHOL/MO/W.PET/CERES
3 CREAM (GRAM) TOPICAL AT BEDTIME
Refills: 0 | Status: DISCONTINUED | OUTPATIENT
Start: 2022-07-05 | End: 2022-07-08

## 2022-07-05 RX ORDER — APIXABAN 2.5 MG/1
5 TABLET, FILM COATED ORAL
Refills: 0 | Status: DISCONTINUED | OUTPATIENT
Start: 2022-07-05 | End: 2022-07-08

## 2022-07-05 RX ORDER — QUETIAPINE FUMARATE 200 MG/1
200 TABLET, FILM COATED ORAL
Refills: 0 | Status: DISCONTINUED | OUTPATIENT
Start: 2022-07-05 | End: 2022-07-08

## 2022-07-05 RX ORDER — VALPROIC ACID (AS SODIUM SALT) 250 MG/5ML
500 SOLUTION, ORAL ORAL
Refills: 0 | Status: DISCONTINUED | OUTPATIENT
Start: 2022-07-05 | End: 2022-07-05

## 2022-07-05 RX ORDER — ACETAMINOPHEN 500 MG
650 TABLET ORAL EVERY 6 HOURS
Refills: 0 | Status: DISCONTINUED | OUTPATIENT
Start: 2022-07-05 | End: 2022-07-05

## 2022-07-05 RX ORDER — CHLORPROMAZINE HCL 10 MG
50 TABLET ORAL EVERY 6 HOURS
Refills: 0 | Status: DISCONTINUED | OUTPATIENT
Start: 2022-07-05 | End: 2022-07-05

## 2022-07-05 RX ORDER — LURASIDONE HYDROCHLORIDE 40 MG/1
40 TABLET ORAL DAILY
Refills: 0 | Status: DISCONTINUED | OUTPATIENT
Start: 2022-07-05 | End: 2022-07-08

## 2022-07-05 RX ORDER — QUETIAPINE FUMARATE 200 MG/1
200 TABLET, FILM COATED ORAL
Refills: 0 | Status: DISCONTINUED | OUTPATIENT
Start: 2022-07-05 | End: 2022-07-05

## 2022-07-05 RX ORDER — OXYCODONE HYDROCHLORIDE 5 MG/1
5 TABLET ORAL ONCE
Refills: 0 | Status: DISCONTINUED | OUTPATIENT
Start: 2022-07-05 | End: 2022-07-05

## 2022-07-05 RX ORDER — MORPHINE SULFATE 50 MG/1
4 CAPSULE, EXTENDED RELEASE ORAL ONCE
Refills: 0 | Status: DISCONTINUED | OUTPATIENT
Start: 2022-07-05 | End: 2022-07-05

## 2022-07-05 RX ORDER — DIPHENHYDRAMINE HCL 50 MG
50 CAPSULE ORAL ONCE
Refills: 0 | Status: COMPLETED | OUTPATIENT
Start: 2022-07-05 | End: 2022-07-05

## 2022-07-05 RX ORDER — QUETIAPINE FUMARATE 200 MG/1
50 TABLET, FILM COATED ORAL DAILY
Refills: 0 | Status: DISCONTINUED | OUTPATIENT
Start: 2022-07-05 | End: 2022-07-05

## 2022-07-05 RX ORDER — PHENOBARBITAL 60 MG
400 TABLET ORAL ONCE
Refills: 0 | Status: DISCONTINUED | OUTPATIENT
Start: 2022-07-05 | End: 2022-07-05

## 2022-07-05 RX ORDER — ENOXAPARIN SODIUM 100 MG/ML
100 INJECTION SUBCUTANEOUS EVERY 12 HOURS
Refills: 0 | Status: DISCONTINUED | OUTPATIENT
Start: 2022-07-05 | End: 2022-07-05

## 2022-07-05 RX ORDER — LEVETIRACETAM 250 MG/1
3000 TABLET, FILM COATED ORAL ONCE
Refills: 0 | Status: DISCONTINUED | OUTPATIENT
Start: 2022-07-05 | End: 2022-07-05

## 2022-07-05 RX ORDER — VALPROIC ACID (AS SODIUM SALT) 250 MG/5ML
1000 SOLUTION, ORAL ORAL
Refills: 0 | Status: DISCONTINUED | OUTPATIENT
Start: 2022-07-05 | End: 2022-07-06

## 2022-07-05 RX ORDER — ACETAMINOPHEN 500 MG
1000 TABLET ORAL ONCE
Refills: 0 | Status: COMPLETED | OUTPATIENT
Start: 2022-07-05 | End: 2022-07-05

## 2022-07-05 RX ORDER — CHLORPROMAZINE HCL 10 MG
50 TABLET ORAL
Refills: 0 | Status: DISCONTINUED | OUTPATIENT
Start: 2022-07-05 | End: 2022-07-06

## 2022-07-05 RX ORDER — PANTOPRAZOLE SODIUM 20 MG/1
40 TABLET, DELAYED RELEASE ORAL
Refills: 0 | Status: DISCONTINUED | OUTPATIENT
Start: 2022-07-05 | End: 2022-07-07

## 2022-07-05 RX ORDER — QUETIAPINE FUMARATE 200 MG/1
50 TABLET, FILM COATED ORAL
Refills: 0 | Status: DISCONTINUED | OUTPATIENT
Start: 2022-07-05 | End: 2022-07-08

## 2022-07-05 RX ORDER — LIDOCAINE 4 G/100G
1 CREAM TOPICAL ONCE
Refills: 0 | Status: COMPLETED | OUTPATIENT
Start: 2022-07-05 | End: 2022-07-05

## 2022-07-05 RX ORDER — VALPROIC ACID (AS SODIUM SALT) 250 MG/5ML
500 SOLUTION, ORAL ORAL
Refills: 0 | Status: DISCONTINUED | OUTPATIENT
Start: 2022-07-05 | End: 2022-07-06

## 2022-07-05 RX ORDER — ACETAMINOPHEN 500 MG
650 TABLET ORAL EVERY 6 HOURS
Refills: 0 | Status: DISCONTINUED | OUTPATIENT
Start: 2022-07-05 | End: 2022-07-08

## 2022-07-05 RX ADMIN — Medication 400 MILLIGRAM(S): at 23:45

## 2022-07-05 RX ADMIN — Medication 2 MILLIGRAM(S): at 10:00

## 2022-07-05 RX ADMIN — Medication 2 MILLIGRAM(S): at 10:15

## 2022-07-05 RX ADMIN — OXYCODONE HYDROCHLORIDE 5 MILLIGRAM(S): 5 TABLET ORAL at 07:04

## 2022-07-05 RX ADMIN — LIDOCAINE 1 PATCH: 4 CREAM TOPICAL at 02:28

## 2022-07-05 RX ADMIN — Medication 2 MILLIGRAM(S): at 10:45

## 2022-07-05 RX ADMIN — LIDOCAINE 1 PATCH: 4 CREAM TOPICAL at 06:56

## 2022-07-05 RX ADMIN — Medication 6 MILLIGRAM(S): at 03:18

## 2022-07-05 RX ADMIN — MORPHINE SULFATE 4 MILLIGRAM(S): 50 CAPSULE, EXTENDED RELEASE ORAL at 05:10

## 2022-07-05 RX ADMIN — Medication 2 MILLIGRAM(S): at 11:15

## 2022-07-05 RX ADMIN — MORPHINE SULFATE 4 MILLIGRAM(S): 50 CAPSULE, EXTENDED RELEASE ORAL at 06:28

## 2022-07-05 RX ADMIN — Medication 50 MILLIGRAM(S): at 05:08

## 2022-07-05 RX ADMIN — Medication 60 MILLIGRAM(S): at 21:40

## 2022-07-05 NOTE — ED POST DISCHARGE NOTE - ADDITIONAL DOCUMENTATION
JORGE L Capellan: After pt was discharged requesting Ibuprofen prescription be sent to her pharmacy as she lives in a group home and is unable to take over the counter medications. Confirmed with , can send rx for IBU 400mg x 2 days, pt is on blood thinner. JORGE L Capellan: After pt was discharged requesting Ibuprofen prescription be sent to her pharmacy as she lives in a group home and is unable to take over the counter medications. Confirmed with , can send rx for IBU 400mg x 2 days, pt is on blood thinner. Pt states she is not allergic to Ibuprofen, has taken it in the past without reaction

## 2022-07-05 NOTE — H&P ADULT - PROBLEM SELECTOR PLAN 5
Diet: NPO for now  DVT: c/w home eliquis    Case antony Carol: [phone]    Legal guardianship :   of Case Management Maria Elena Armijo: [phone]   Cliff Carlton Raza ([phone]),

## 2022-07-05 NOTE — ED PROCEDURE NOTE - NS ED PROCEDURE ASSISTED BY
Supervision was available
The procedure was performed independently
The procedure was performed independently

## 2022-07-05 NOTE — ED PROVIDER NOTE - CRITICAL CARE ATTENDING CONTRIBUTION TO CARE
Attending MD Vo.  Agree with above.  Initial documentation of repeat visit completed by me.  Pt cared for by myself and JORGE L Capellan in real time.  Appreciated assistance by Dr. Mann with IV line access (LUE peripheral IV).        Critical care billing:  Upon my evaluation, this patient had a high probability of imminent or life-threatening deterioration due to status epilepticus, which required my direct attention, intervention, and personal management.  The patient has a  medical condition that impairs one or more vital organ systems.  Frequent personal assessment and adjustment of medical interventions was performed.      I have personally provided 60 minutes of critical care time exclusive of time spent on separately billable procedures. Time includes review of laboratory data, radiology results, discussion with consultants, patient and family; monitoring for potential decompensation, as well as time spent retrieving data and reviewing the chart and documenting the visit. Interventions were performed as documented above.

## 2022-07-05 NOTE — ED PROCEDURE NOTE - PROCEDURE ADDITIONAL DETAILS
Peripheral IV access in the Emergency Department obtained under dynamic ultrasound guidance with dark nonpulsatile blood return.  Catheter was flushed afterwards without any resistance or resulting extravasation.  IV catheter confirmed in compressible vein after insertion.
20g 48mm catheter inserted into left basilic vein under dynamic u/s guidance 1 attempt.
Attempted US-guided IV line x 2 to proximal to left AC then distal to left AC without success

## 2022-07-05 NOTE — ED PROCEDURE NOTE - PROCEDURE NAME, MLM
Point of Care Ultrasound Vascular Access
Point of Care Ultrasound Vascular Access
Peripheral Line Insertion

## 2022-07-05 NOTE — H&P ADULT - PROBLEM SELECTOR PLAN 2
History of extensive bipolar disorder on multiple anti-psychotic medications at home  - currently on multiple antipsychotics: High dose seroquel, abilify, chlorpromazine ,lurasidone, tizanidine  - based on previous BH assessment:   - Abilfy 30mg daily, Latuda 40mg at noon  - Depakote  daily and 1000 night   - Seroquel 200mg at 8am and 8pm and at 12PM   - Chlorpromazine 50mg 4x daily  - given patient appears too sedated after ativan will hold [facility] medications for now  -  consult in AM

## 2022-07-05 NOTE — ED PROVIDER NOTE - RESPIRATORY, MLM
Breath sounds clear and equal bilaterally. Initial tachypnea with shallow respirations followed by normal spn

## 2022-07-05 NOTE — ED ADULT NURSE REASSESSMENT NOTE - NS ED NURSE REASSESS COMMENT FT1
PCA came up to writer around 9:50 am and stated "the patient in the hallway that was discharged is feeling dizzy. Can you come see her?" Writer came over to patient and noticed pt was unresponsive, with dilated pupils, and nystagmus. Pts respirations were even and unlabored. Immediately writer called over ED team and moved to intake RM 12. Pt was placed on a NRB and cardiac monitor with HR in the low 100s, sating at 100% on RA. 2 mg of ativan IM was given in R arm. Pt was still seizing after a few minutes so a second dose of IM ativan was given. pt became minimally responsive but seized again and another 2 mg of ativan was given due to not having access. 2 RNs attempted to place IV but were unsuccessful. US guided IV performed by Dr. Mann with 20 G to L AC. Pt was moved to trauma B because pt became to tonic clonic seize and another 2 mg of IV ativan was given. Report given to trauma room nurse and mobile ICU nurse.

## 2022-07-05 NOTE — H&P ADULT - NSHPLABSRESULTS_GEN_ALL_CORE
LABS:                           11.7   5.82  )-----------( 179      ( 2022 11:20 )             37.8     07-05    142  |  105  |  13  ----------------------------<  121<H>  4.4   |  23  |  0.83    Ca    9.9      2022 11:20    TPro  8.9<H>  /  Alb  4.7  /  TBili  0.2  /  DBili  x   /  AST  28  /  ALT  32  /  AlkPhos  52  07-05              Urinalysis Basic - ( 2022 23:35 )    Color: Yellow / Appearance: Clear / S.037 / pH: x  Gluc: x / Ketone: Trace  / Bili: Negative / Urobili: 3 mg/dL   Blood: x / Protein: 30 mg/dL / Nitrite: Negative   Leuk Esterase: Negative / RBC: 1 /HPF / WBC 2 /HPF   Sq Epi: x / Non Sq Epi: Moderate / Bacteria: Occasional        LIVER FUNCTIONS - ( 2022 11:20 )  Alb: 4.7 g/dL / Pro: 8.9 g/dL / ALK PHOS: 52 U/L / ALT: 32 U/L / AST: 28 U/L / GGT: x                 I&O's Summary         /     CAPILLARY BLOOD GLUCOSE                MICRO:

## 2022-07-05 NOTE — ED ADULT NURSE NOTE - OBJECTIVE STATEMENT
Report received from day shift RN Do, pt resp even unalbored, in NAD. Patient had a seizure prior to discharge yesterday after being seen for lower back pain. as per dayshift RN pt had a tonic clonic seizure, compliant with medication. vitals as per flowsheet. admitted awaiting bed assignment

## 2022-07-05 NOTE — ED PROVIDER NOTE - NSICDXFAMILYHX_GEN_ALL_CORE_FT
89 FAMILY HISTORY:  Mother  Still living? Unknown  Family history of DVT, Age at diagnosis: Age Unknown

## 2022-07-05 NOTE — ED ADULT NURSE NOTE - ED STAT RN HANDOFF DETAILS
Report given to [facility] 1 ALEYDA Garcia. Pt resp even unalbored, in NAD. vitals as per flowsheet. Pt stable upon exit.

## 2022-07-05 NOTE — CONSULT NOTE ADULT - SUBJECTIVE AND OBJECTIVE BOX
Neurology - Consult Note - 07-05-22  -  Avelino Serrano MD  PGY-3 Neurology  Spectra: [phone] ([facility]), [phone] ([facility])  -    Name: YODIT MORROW; 37y (1985)    Reason for Admission:     Chief Complaint:     HPI: History obtained via chart review and ED provider.    37 year old *** handed female w/ PMHx obesity, factor V Leiden deficiency a/w DVT/PE (apixaban 5mg BID), seizures a/w pseudoseizures (VPA ER 500mg QD, 1000mg qHS), bipolar disorder, endometriosis, hypothyroid, recently discharged from ED on 7/1/22 after a visit for shoulder abscess and had drainage by IR, who then presented again to ED 7/4/22 w/ complaint of lower back pain worsening over the prior day w/ MRI consistent w/ chronic degenerative disease during without acutely actionable findings. Patient was discharged and seated on a stretcher awaiting a ride when she endorsed dizziness to staff, states that she felt like she was going to have a seizure, and was noted to start having "tonic-clonic seizure-like activity with truncal spasms, pupils 4-5 mm non-reactive, involuntary nystagmus and lack of pupillary reflex / dolls eyes," per ED examination. Patient reportedly without responsiveness during this event, and noted to be tachypneic to the 40s w/ shallow respirations. Per RN reassessment note, patient was placed on NRB and cardiac monitor w/ HR in low 100s, sating at 100% on RA. Patient moved into a room and given lorazepam 2mg IM. Patient reportedly still seizing after a few minutes, so was given second dose of lorazepam 2mg IM. She was minimally responsive, but seized again, and another 2mg lorazepam was given. Patient started have tonic clonic movements again, and another lorazepam 2mg IV was given.       Review of Systems:  unable to obtain ROS d/t lethargy s/p lorazepam 2mg IVP x 4 (8mg total)    Allergies:  latex (Blisters)  penicillin (Hives)  penicillin (Other)  pineapple (Unknown)  Toradol (Rash)  Toradol (Unknown)  Zofran (Hives)  Zofran (Unknown)      PMHx:   Asthma  Seizure  Factor V Leiden  Bipolar disorder  Endometriosis  History of DVT in adulthood  Seizures  Hypothyroid  Seasonal allergies  Insomnia  GERD (gastroesophageal reflux disease)      PFHx:   No pertinent family history in first degree relatives    Family history of DVT (Mother)      PSuHx:   No significant past surgical history        Medications:  MEDICATIONS  (STANDING):    MEDICATIONS  (PRN):      Vitals:  T(C): 37 (07-05-22 @ 11:15), Max: 37.1 (07-04-22 @ 21:31)  HR: 76 (07-05-22 @ 11:43) (63 - 112)  BP: 110/73 (07-05-22 @ 11:43) (110/73 - 140/89)  RR: 25 (07-05-22 @ 11:43) (16 - 25)  SpO2: 100% (07-05-22 @ 11:43) (100% - 100%)    Physical Examination: INCOMPLETE  ***    Labs:                        11.7   5.82  )-----------( 179      ( 05 Jul 2022 11:20 )             37.8     07-05    142  |  105  |  13  ----------------------------<  121<H>  4.4   |  23  |  0.83    Ca    9.9      05 Jul 2022 11:20    TPro  8.9<H>  /  Alb  4.7  /  TBili  0.2  /  DBili  x   /  AST  28  /  ALT  32  /  AlkPhos  52  07-05    CAPILLARY BLOOD GLUCOSE        LIVER FUNCTIONS - ( 05 Jul 2022 11:20 )  Alb: 4.7 g/dL / Pro: 8.9 g/dL / ALK PHOS: 52 U/L / ALT: 32 U/L / AST: 28 U/L / GGT: x               CSF:                  Radiology:  CT Head No Cont:  (05 Jul 2022 14:47)     Neurology - Consult Note - 07-05-22  -  Avelino Serrano MD  PGY-3 Neurology  Spectra: [phone] ([facility]), [phone] ([facility])  -    Name: YODIT MORROW; 37y (1985)    Reason for Admission:     Chief Complaint:     HPI: History obtained via chart review and ED provider.    37 year old female w/ PMHx obesity, factor V Leiden deficiency a/w DVT/PE (apixaban 5mg BID), seizures a/w pseudoseizures (VPA ER 500mg QD, 1000mg qHS), bipolar disorder, endometriosis, hypothyroid, recently discharged from ED on 7/1/22 after a visit for shoulder abscess and had drainage by IR, who then presented again to ED 7/4/22 w/ complaint of lower back pain worsening over the prior day w/ MRI consistent w/ chronic degenerative disease during without acutely actionable findings. Patient was discharged and seated on a stretcher awaiting a ride when she endorsed dizziness to staff, states that she felt like she was going to have a seizure, and was noted to start having "tonic-clonic seizure-like activity with truncal spasms, pupils 4-5 mm non-reactive, involuntary nystagmus and lack of pupillary reflex / dolls eyes," per ED examination. Patient reportedly without responsiveness during this event, and noted to be tachypneic to the 40s w/ shallow respirations. Per RN reassessment note, patient was placed on NRB and cardiac monitor w/ HR in low 100s, sating at 100% on RA. Patient moved into a room and given lorazepam 2mg IM. Patient reportedly still seizing after a few minutes, so was given second dose of lorazepam 2mg IM. She was minimally responsive, but seized again, and another 2mg lorazepam was given. Patient started have tonic clonic movements again, and another lorazepam 2mg IV was given. Of note, patient was given levetiracetam 3,000mg load in the ED as well.    Per chart review, patient seen by Neurologist Dr. Sanches in 6/15/22 for abnormal movements. At that time, that particular episode was consistent with her pseudoseizures. Patient follows with Dr. Byers in outpatient setting.      Review of Systems:  unable to obtain ROS d/t lethargy s/p lorazepam 2mg IVP x 4 (8mg total)    Allergies:  latex (Blisters)  penicillin (Hives)  penicillin (Other)  pineapple (Unknown)  Toradol (Rash)  Toradol (Unknown)  Zofran (Hives)  Zofran (Unknown)      PMHx:   Asthma  Seizure  Factor V Leiden  Bipolar disorder  Endometriosis  History of DVT in adulthood  Seizures  Hypothyroid  Seasonal allergies  Insomnia  GERD (gastroesophageal reflux disease)      PFHx:   Family history of DVT (Mother)      PSuHx:   No significant past surgical history        Medications:  MEDICATIONS  (STANDING):    MEDICATIONS  (PRN):      Vitals:  T(C): 37 (07-05-22 @ 11:15), Max: 37.1 (07-04-22 @ 21:31)  HR: 76 (07-05-22 @ 11:43) (63 - 112)  BP: 110/73 (07-05-22 @ 11:43) (110/73 - 140/89)  RR: 25 (07-05-22 @ 11:43) (16 - 25)  SpO2: 100% (07-05-22 @ 11:43) (100% - 100%)    Physical Examination: limited examination s/p lorazepam 2mg IVP x 4 (8mg total)    Constitutional: obese, well-groomed  Eyes: ophthalmoscopic exam deferred secondary to COVID-19 pandemic  Cardiovascular: warm-to-touch    Neurological Examination:    - Mental Status: eyes closed, opens to physical stimulation, eyes do not remain open; minimal verbal output; does not verbalize her name on multiple commands; follows few simple commands at this time.    - Cranial Nerves:  II: Eyes closed, limited visual field examination; pupils are equal, round, and reactive to light  III, IV, VI: Limited EOM examination d/t not following commands; crosses midline; no overt nystagmus  V: Decreased sensation light touch on the RIGHT  VII: Grossly symmetric face at rest  VIII: Hearing is intact to conversation    - Motor/Strength Testing:  - Mild drift b/l UE and b/l LE  - Generalized weakness w/ b/l , biceps, triceps (non-lateralizing)  - Generalized weakness b/l LE (checked distally)    - Reflexes: Patient not cooperative with b/l UE reflex examination (i.e., moving limbs). Ankles b/l 0+ vs 1+    - Plant responses mute bilaterally.    - Sensory: Decreased to light touch on RIGHT face, RIGHT UE, RIGHT chest and LEFT LE.  - Coordination: Does not follow command  - Gait: Unable to assess d/t decreased level of consciousness        Labs:                        11.7   5.82  )-----------( 179      ( 05 Jul 2022 11:20 )             37.8     07-05    142  |  105  |  13  ----------------------------<  121<H>  4.4   |  23  |  0.83    Ca    9.9      05 Jul 2022 11:20    TPro  8.9<H>  /  Alb  4.7  /  TBili  0.2  /  DBili  x   /  AST  28  /  ALT  32  /  AlkPhos  52  07-05    CAPILLARY BLOOD GLUCOSE        LIVER FUNCTIONS - ( 05 Jul 2022 11:20 )  Alb: 4.7 g/dL / Pro: 8.9 g/dL / ALK PHOS: 52 U/L / ALT: 32 U/L / AST: 28 U/L / GGT: x               CSF:                  Radiology:  CT Head No Cont:  (05 Jul 2022 14:47)     Neurology - Consult Note - 07-05-22  -  Avelino Serrano MD  PGY-3 Neurology  Spectra: [phone] ([facility]), [phone] ([facility])  -    Name: YODIT MORROW; 37y (1985)    Reason for Admission:     Chief Complaint:     HPI: History obtained via chart review and ED provider.    37 year old female w/ PMHx obesity, factor V Leiden deficiency a/w DVT/PE (apixaban 5mg BID), seizures a/w pseudoseizures (VPA ER 500mg QD, 1000mg qHS), bipolar disorder, endometriosis, hypothyroid, recently discharged from ED on 7/1/22 after a visit for shoulder abscess and had drainage by IR, who then presented again to ED 7/4/22 w/ complaint of lower back pain worsening over the prior day w/ MRI consistent w/ chronic degenerative disease during without acutely actionable findings. Patient was discharged and seated on a stretcher awaiting a ride when she endorsed dizziness to staff, states that she felt like she was going to have a seizure, and was noted to start having "tonic-clonic seizure-like activity with truncal spasms, pupils 4-5 mm non-reactive, involuntary nystagmus and lack of pupillary reflex / dolls eyes," per ED examination. Patient reportedly without responsiveness during this event, and noted to be tachypneic to the 40s w/ shallow respirations. Per RN reassessment note, patient was placed on NRB and cardiac monitor w/ HR in low 100s, sating at 100% on RA. Patient moved into a room and given lorazepam 2mg IM. Patient reportedly still seizing after a few minutes, so was given second dose of lorazepam 2mg IM. She was minimally responsive, but seized again, and another 2mg lorazepam was given. Patient started have tonic clonic movements again, and another lorazepam 2mg IV was given. Of note, patient was given levetiracetam 3,000mg load in the ED as well.    Per chart review, patient seen by Neurologist Dr. Sanches in 6/15/22 for abnormal movements. At that time, that particular episode was consistent with her pseudoseizures. Patient follows with Dr. Byers in outpatient setting.      Review of Systems:  unable to obtain ROS d/t lethargy s/p lorazepam 2mg x 4 (8mg total)    Allergies:  latex (Blisters)  penicillin (Hives)  penicillin (Other)  pineapple (Unknown)  Toradol (Rash)  Toradol (Unknown)  Zofran (Hives)  Zofran (Unknown)      PMHx:   Asthma  Seizure  Factor V Leiden  Bipolar disorder  Endometriosis  History of DVT in adulthood  Seizures  Hypothyroid  Seasonal allergies  Insomnia  GERD (gastroesophageal reflux disease)      PFHx:   Family history of DVT (Mother)      PSuHx:   No significant past surgical history        Medications:  MEDICATIONS  (STANDING):    MEDICATIONS  (PRN):      Vitals:  T(C): 37 (07-05-22 @ 11:15), Max: 37.1 (07-04-22 @ 21:31)  HR: 76 (07-05-22 @ 11:43) (63 - 112)  BP: 110/73 (07-05-22 @ 11:43) (110/73 - 140/89)  RR: 25 (07-05-22 @ 11:43) (16 - 25)  SpO2: 100% (07-05-22 @ 11:43) (100% - 100%)    Physical Examination: limited examination s/p lorazepam 2mg x 4 (8mg total)    Constitutional: obese, well-groomed  Eyes: ophthalmoscopic exam deferred secondary to COVID-19 pandemic  Cardiovascular: warm-to-touch    Neurological Examination:    - Mental Status: eyes closed, opens to physical stimulation, eyes do not remain open; minimal verbal output; does not verbalize her name on multiple commands; follows few simple commands at this time.    - Cranial Nerves:  II: Eyes closed, limited visual field examination; pupils are equal, round, and reactive to light  III, IV, VI: Limited EOM examination d/t not following commands; crosses midline; no overt nystagmus  V: Decreased sensation light touch on the RIGHT  VII: Grossly symmetric face at rest  VIII: Hearing is intact to conversation    - Motor/Strength Testing:  - Mild drift b/l UE and b/l LE  - Generalized weakness w/ b/l , biceps, triceps (non-lateralizing)  - Generalized weakness b/l LE (checked distally)    - Reflexes: Patient not cooperative with b/l UE reflex examination (i.e., moving limbs). Ankles b/l 0+ vs 1+    - Plant responses mute bilaterally.    - Sensory: Decreased to light touch on RIGHT face, RIGHT UE, RIGHT chest and LEFT LE.  - Coordination: Does not follow command  - Gait: Unable to assess d/t decreased level of consciousness        Labs:                        11.7   5.82  )-----------( 179      ( 05 Jul 2022 11:20 )             37.8     07-05    142  |  105  |  13  ----------------------------<  121<H>  4.4   |  23  |  0.83    Ca    9.9      05 Jul 2022 11:20    TPro  8.9<H>  /  Alb  4.7  /  TBili  0.2  /  DBili  x   /  AST  28  /  ALT  32  /  AlkPhos  52  07-05    CAPILLARY BLOOD GLUCOSE        LIVER FUNCTIONS - ( 05 Jul 2022 11:20 )  Alb: 4.7 g/dL / Pro: 8.9 g/dL / ALK PHOS: 52 U/L / ALT: 32 U/L / AST: 28 U/L / GGT: x              Radiology:  CT Head No Cont:  (05 Jul 2022 14:47)  FINDINGS:    No hydrocephalus, mass effect, midline shift, acute intracranial   hemorrhage, or brain edema.    No displaced calvarial fracture.    Visualized paranasal sinuses and mastoid air cells are clear.    IMPRESSION:    No acute intracranial hemorrhage, brain edema, or mass effect.  No displaced calvarial fracture.      MR Acute Spinal Cord Compression (07.05.22 @ 05:57)   Loss of the normal cervical and lumbar lordosis is seen.    The vertebral body height alignment and marrow signal appear normal    Disc desiccation is seen involving C2-C3 4 C4-5 C5-6 and T1-2 levels.   Similar findings are seen involving the L2-3 level. These findings are   secondary to chronic degenerative changes.    Disc bulging is seen involving the C3-4 C4-5 and C5-6 level. This is most   prominent at the C5-6 level. This finding can be better evaluated with   complete MRI of the cervical spine.    The spinal cord demonstrates normal signal and caliber with no spinal   cord compression identified.    IMPRESSION: Degenerative changes.    No spinal cord compression is seen.    Neurology - Consult Note - 07-05-22  -  Avelino Serrano MD  PGY-3 Neurology  Spectra: [phone] ([facility]), [phone] ([facility])  -    Name: YODIT MORROW; 37y (1985)    Reason for Admission:     Chief Complaint:     HPI: History obtained via chart review and ED provider.    37 year old female w/ PMHx obesity, factor V Leiden deficiency a/w DVT/PE (apixaban 5mg BID), seizures a/w pseudoseizures (VPA ER 500mg QD, 1000mg qHS), bipolar disorder, endometriosis, hypothyroid, recently discharged on 7/2/22 after a visit for shoulder abscess and had drainage by IR, who then presented again to ED 7/4/22 w/ complaint of lower back pain worsening over the prior day w/ MRI consistent w/ chronic degenerative disease during without acutely actionable findings. Patient was discharged and seated on a stretcher awaiting a ride when she endorsed dizziness to staff, states that she felt like she was going to have a seizure, and was noted to start having "tonic-clonic seizure-like activity with truncal spasms, pupils 4-5 mm non-reactive, involuntary nystagmus and lack of pupillary reflex / dolls eyes," per ED examination. Patient reportedly without responsiveness during this event, and noted to be tachypneic to the 40s w/ shallow respirations. Per RN reassessment note, patient was placed on NRB and cardiac monitor w/ HR in low 100s, sating at 100% on RA. Patient moved into a room and given lorazepam 2mg IM. Patient reportedly still seizing after a few minutes, so was given second dose of lorazepam 2mg IM. She was minimally responsive, but seized again, and another 2mg lorazepam was given. Patient started have tonic clonic movements again, and another lorazepam 2mg IV was given. Of note, patient was given levetiracetam 3,000mg load in the ED as well.    Per chart review, patient seen by Neurologist Dr. Sanches in 6/15/22 for abnormal movements. At that time, that particular episode was consistent with her pseudoseizures. Patient follows with Dr. Byers in outpatient setting.      Review of Systems:  unable to obtain ROS d/t lethargy s/p lorazepam 2mg x 4 (8mg total)    Allergies:  latex (Blisters)  penicillin (Hives)  penicillin (Other)  pineapple (Unknown)  Toradol (Rash)  Toradol (Unknown)  Zofran (Hives)  Zofran (Unknown)      PMHx:   Asthma  Seizure  Factor V Leiden  Bipolar disorder  Endometriosis  History of DVT in adulthood  Seizures  Hypothyroid  Seasonal allergies  Insomnia  GERD (gastroesophageal reflux disease)      PFHx:   Family history of DVT (Mother)      PSuHx:   No significant past surgical history        Medications:  MEDICATIONS  (STANDING):    MEDICATIONS  (PRN):      Vitals:  T(C): 37 (07-05-22 @ 11:15), Max: 37.1 (07-04-22 @ 21:31)  HR: 76 (07-05-22 @ 11:43) (63 - 112)  BP: 110/73 (07-05-22 @ 11:43) (110/73 - 140/89)  RR: 25 (07-05-22 @ 11:43) (16 - 25)  SpO2: 100% (07-05-22 @ 11:43) (100% - 100%)    Physical Examination: limited examination s/p lorazepam 2mg x 4 (8mg total)    Constitutional: obese, well-groomed  Eyes: ophthalmoscopic exam deferred secondary to COVID-19 pandemic  Cardiovascular: warm-to-touch    Neurological Examination:    - Mental Status: eyes closed, opens to physical stimulation, eyes do not remain open; minimal verbal output; does not verbalize her name on multiple commands; follows few simple commands at this time.    - Cranial Nerves:  II: Eyes closed, limited visual field examination; pupils are equal, round, and reactive to light  III, IV, VI: Limited EOM examination d/t not following commands; crosses midline; no overt nystagmus  V: Decreased sensation light touch on the RIGHT  VII: Grossly symmetric face at rest  VIII: Hearing is intact to conversation    - Motor/Strength Testing:  - Mild drift b/l UE and b/l LE  - Generalized weakness w/ b/l , biceps, triceps (non-lateralizing)  - Generalized weakness b/l LE (checked distally)    - Reflexes: Patient not cooperative with b/l UE reflex examination (i.e., moving limbs). Ankles b/l 0+ vs 1+    - Plant responses mute bilaterally.    - Sensory: Decreased to light touch on RIGHT face, RIGHT UE, RIGHT chest and LEFT LE.  - Coordination: Does not follow command  - Gait: Unable to assess d/t decreased level of consciousness        Labs:                        11.7   5.82  )-----------( 179      ( 05 Jul 2022 11:20 )             37.8     07-05    142  |  105  |  13  ----------------------------<  121<H>  4.4   |  23  |  0.83    Ca    9.9      05 Jul 2022 11:20    TPro  8.9<H>  /  Alb  4.7  /  TBili  0.2  /  DBili  x   /  AST  28  /  ALT  32  /  AlkPhos  52  07-05    CAPILLARY BLOOD GLUCOSE        LIVER FUNCTIONS - ( 05 Jul 2022 11:20 )  Alb: 4.7 g/dL / Pro: 8.9 g/dL / ALK PHOS: 52 U/L / ALT: 32 U/L / AST: 28 U/L / GGT: x              Radiology:  CT Head No Cont:  (05 Jul 2022 14:47)  FINDINGS:    No hydrocephalus, mass effect, midline shift, acute intracranial   hemorrhage, or brain edema.    No displaced calvarial fracture.    Visualized paranasal sinuses and mastoid air cells are clear.    IMPRESSION:    No acute intracranial hemorrhage, brain edema, or mass effect.  No displaced calvarial fracture.      MR Acute Spinal Cord Compression (07.05.22 @ 05:57)   Loss of the normal cervical and lumbar lordosis is seen.    The vertebral body height alignment and marrow signal appear normal    Disc desiccation is seen involving C2-C3 4 C4-5 C5-6 and T1-2 levels.   Similar findings are seen involving the L2-3 level. These findings are   secondary to chronic degenerative changes.    Disc bulging is seen involving the C3-4 C4-5 and C5-6 level. This is most   prominent at the C5-6 level. This finding can be better evaluated with   complete MRI of the cervical spine.    The spinal cord demonstrates normal signal and caliber with no spinal   cord compression identified.    IMPRESSION: Degenerative changes.    No spinal cord compression is seen.    Neurology - Consult Note - 07-05-22  -  Avelino Serrano MD  PGY-3 Neurology  Spectra: [phone] ([facility]), [phone] ([facility])  -    Name: YODIT MORROW; 37y (1985)    Reason for Admission:     Chief Complaint:     HPI: History obtained via chart review and ED provider.    37 year old female w/ PMHx obesity, factor V Leiden deficiency a/w DVT/PE (apixaban 5mg BID), seizures a/w pseudoseizures (VPA ER 500mg QD, 1000mg qHS), bipolar disorder, endometriosis, hypothyroid, recently discharged on 7/2/22 after a visit for shoulder abscess and had drainage by IR, who then presented again to ED 7/4/22 w/ complaint of lower back pain worsening over the prior day w/ MRI consistent w/ chronic degenerative disease during without acutely actionable findings. Patient was discharged and seated on a stretcher awaiting a ride when she endorsed dizziness to staff, states that she felt like she was going to have a seizure, and was noted to start having "tonic-clonic seizure-like activity with truncal spasms, pupils 4-5 mm non-reactive, involuntary nystagmus and lack of pupillary reflex / dolls eyes," per ED examination. Patient reportedly without responsiveness during this event, and noted to be tachypneic to the 40s w/ shallow respirations. Per RN reassessment note, patient was placed on NRB and cardiac monitor w/ HR in low 100s, sating at 100% on RA. Patient moved into a room and given lorazepam 2mg IM. Patient reportedly still seizing after a few minutes, so was given second dose of lorazepam 2mg IM. She was minimally responsive, but seized again, and another 2mg lorazepam was given. Patient started have tonic clonic movements again, and another lorazepam 2mg IV was given. Of note, patient was given levetiracetam 3,000mg load in the ED as well.    Per chart review, patient seen by Neurologist Dr. Sanches in 6/15/22 for abnormal movements. At that time, that particular episode was consistent with her pseudoseizures. Patient follows with Dr. Byers in outpatient setting.      Review of Systems:  unable to obtain ROS d/t lethargy s/p lorazepam 2mg x 4 (8mg total)    Allergies:  latex (Blisters)  penicillin (Hives)  penicillin (Other)  pineapple (Unknown)  Toradol (Rash)  Toradol (Unknown)  Zofran (Hives)  Zofran (Unknown)      PMHx:   Asthma  Seizure  Factor V Leiden  Bipolar disorder  Endometriosis  History of DVT in adulthood  Seizures  Hypothyroid  Seasonal allergies  Insomnia  GERD (gastroesophageal reflux disease)      PFHx:   Family history of DVT (Mother)      PSuHx:   No significant past surgical history        Medications:  MEDICATIONS  (STANDING):    MEDICATIONS  (PRN):      Vitals:  T(C): 37 (07-05-22 @ 11:15), Max: 37.1 (07-04-22 @ 21:31)  HR: 76 (07-05-22 @ 11:43) (63 - 112)  BP: 110/73 (07-05-22 @ 11:43) (110/73 - 140/89)  RR: 25 (07-05-22 @ 11:43) (16 - 25)  SpO2: 100% (07-05-22 @ 11:43) (100% - 100%)    Physical Examination: limited examination s/p lorazepam 2mg x 4 (8mg total)    Constitutional: obese, well-groomed  Eyes: ophthalmoscopic exam deferred secondary to COVID-19 pandemic  Cardiovascular: warm-to-touch    Neurological Examination:    - Mental Status: eyes closed, opens to physical stimulation, eyes do not remain open; minimal verbal output; does not verbalize her name on multiple commands; follows few simple commands at this time.    - Cranial Nerves:  II: Eyes closed, limited visual field examination; pupils are equal, round, and reactive to light  III, IV, VI: Limited EOM examination d/t not following commands; crosses midline; no overt nystagmus  V: Decreased sensation light touch on the RIGHT  VII: Grossly symmetric face at rest  VIII: Hearing is intact to conversation    - Motor/Strength Testing:  - Mild drift b/l UE and b/l LE  - Generalized weakness w/ b/l , biceps, triceps (non-lateralizing)  - Generalized weakness b/l LE (checked distally)    - Reflexes: Patient not cooperative with b/l UE reflex examination (i.e., moving limbs). Ankles b/l 0+ vs 1+    - Plant responses mute bilaterally.    - Sensory: Decreased to light touch on RIGHT face, RIGHT UE, RIGHT chest and LEFT LE.  - Coordination: Does not follow command  - Gait: Unable to assess d/t decreased level of consciousness        Labs:                        11.7   5.82  )-----------( 179      ( 05 Jul 2022 11:20 )             37.8     07-05    142  |  105  |  13  ----------------------------<  121<H>  4.4   |  23  |  0.83    Ca    9.9      05 Jul 2022 11:20    TPro  8.9<H>  /  Alb  4.7  /  TBili  0.2  /  DBili  x   /  AST  28  /  ALT  32  /  AlkPhos  52  07-05    CAPILLARY BLOOD GLUCOSE        LIVER FUNCTIONS - ( 05 Jul 2022 11:20 )  Alb: 4.7 g/dL / Pro: 8.9 g/dL / ALK PHOS: 52 U/L / ALT: 32 U/L / AST: 28 U/L / GGT: x             EEG REPORT 6/15/22  EEG SUMMARY/CLASSIFICATION    Normal EEG in the drowsy and asleep states.  _____________________________________________________________  EEG IMPRESSION/CLINICAL CORRELATE    Normal EEG study in the drowsy/sleep states.  No epileptiform pattern or seizure seen.  Study technically limited by lack of observation of awake state       EEG REPORT 5/23/22  **EEG SUMMARY/CLASSIFICATION**    Abnormal EEG in a sedated patient.  - Background slowing, generalized.  - Diffuse excess beta activity.  __________________________________________________________  **EEG IMPRESSION/CLINICAL CORRELATE**    Abnormal EEG study.    - Mild to Moderate nonspecific diffuse or multifocal cerebral dysfunction -- which may be due to sedation.  - Excess diffuse beta activity may be seen with benzodiazepines or sedative medications use.  - No epileptiform patterns or seizures recorded.      Radiology:  CT Head No Cont:  (05 Jul 2022 14:47)  FINDINGS:    No hydrocephalus, mass effect, midline shift, acute intracranial   hemorrhage, or brain edema.    No displaced calvarial fracture.    Visualized paranasal sinuses and mastoid air cells are clear.    IMPRESSION:    No acute intracranial hemorrhage, brain edema, or mass effect.  No displaced calvarial fracture.      MR Acute Spinal Cord Compression (07.05.22 @ 05:57)   Loss of the normal cervical and lumbar lordosis is seen.    The vertebral body height alignment and marrow signal appear normal    Disc desiccation is seen involving C2-C3 4 C4-5 C5-6 and T1-2 levels.   Similar findings are seen involving the L2-3 level. These findings are   secondary to chronic degenerative changes.    Disc bulging is seen involving the C3-4 C4-5 and C5-6 level. This is most   prominent at the C5-6 level. This finding can be better evaluated with   complete MRI of the cervical spine.    The spinal cord demonstrates normal signal and caliber with no spinal   cord compression identified.    IMPRESSION: Degenerative changes.    No spinal cord compression is seen.    Neurology - Consult Note - 07-05-22  -  Avelino Serrano MD  PGY-3 Neurology  Spectra: [phone] ([facility]), [phone] ([facility])  -    Name: YODIT MORROW; 37y (1985)    Reason for Admission:     Chief Complaint:     HPI: History obtained via chart review and ED provider.    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 Patient was discharged and seated on a stretcher awaiting a ride when she endorsed dizziness to staff, states that she felt like she was going to have a seizure, and was noted to start having "tonic-clonic seizure-like activity with truncal spasms, pupils 4-5 mm non-reactive, involuntary nystagmus and lack of pupillary reflex / dolls eyes," per ED examination. Patient reportedly without responsiveness during this event, and noted to be tachypneic to the 40s w/ shallow respirations. Per RN reassessment note, patient was placed on NRB and cardiac monitor w/ HR in low 100s, sating at 100% on RA. Patient moved into a room and given lorazepam 2mg IM. Patient reportedly still seizing after a few minutes, so was given second dose of lorazepam 2mg IM. She was minimally responsive, but seized again, and another 2mg lorazepam was given. Patient started have tonic clonic movements again, and another lorazepam 2mg IV was given. Of note, patient was given levetiracetam 3,000mg load in the ED as well.    Per chart review, patient seen by Neurologist Dr. Sanches in 6/15/22 for abnormal movements. At that time, that particular episode was consistent with her pseudoseizures. Patient follows with Dr. Byers in outpatient setting. Per outpatient note, patient has "?aura vs anxiety characterized as abdominal fluttering." Patient was on levetiracetam and switched to VPA at [place] d/t concern for abdominal discomfort. Unclear whether there was any complaint of mood change (i.e., irritability) on levetiracetam.      Review of Systems:  unable to obtain ROS d/t lethargy s/p lorazepam 2mg x 4 (8mg total)    Allergies:  latex (Blisters)  penicillin (Hives)  penicillin (Other)  pineapple (Unknown)  Toradol (Rash)  Toradol (Unknown)  Zofran (Hives)  Zofran (Unknown)      PMHx:   Asthma  Seizure  Factor V Leiden  Bipolar disorder  Endometriosis  History of DVT in adulthood  Seizures  Hypothyroid  Seasonal allergies  Insomnia  GERD (gastroesophageal reflux disease)      PFHx:   Family history of DVT (Mother)      PSuHx:   No significant past surgical history        Medications:  MEDICATIONS  (STANDING):    MEDICATIONS  (PRN):      Vitals:  T(C): 37 (07-05-22 @ 11:15), Max: 37.1 (07-04-22 @ 21:31)  HR: 76 (07-05-22 @ 11:43) (63 - 112)  BP: 110/73 (07-05-22 @ 11:43) (110/73 - 140/89)  RR: 25 (07-05-22 @ 11:43) (16 - 25)  SpO2: 100% (07-05-22 @ 11:43) (100% - 100%)    Physical Examination: limited examination s/p lorazepam 2mg x 4 (8mg total)    Constitutional: obese, well-groomed  Eyes: ophthalmoscopic exam deferred secondary to COVID-19 pandemic  Cardiovascular: warm-to-touch    Neurological Examination:    - Mental Status: eyes closed, opens to physical stimulation, eyes do not remain open; minimal verbal output; does not verbalize her name on multiple commands; follows few simple commands at this time.    - Cranial Nerves:  II: Eyes closed, limited visual field examination; pupils are equal, round, and reactive to light  III, IV, VI: Limited EOM examination d/t not following commands; crosses midline; no overt nystagmus  V: Decreased sensation light touch on the RIGHT  VII: Grossly symmetric face at rest  VIII: Hearing is intact to conversation    - Motor/Strength Testing:  - Mild drift b/l UE and b/l LE  - Generalized weakness w/ b/l , biceps, triceps (non-lateralizing)  - Generalized weakness b/l LE (checked distally)    - Reflexes: Patient not cooperative with b/l UE reflex examination (i.e., moving limbs). Ankles b/l 0+ vs 1+    - Plant responses mute bilaterally.    - Sensory: Decreased to light touch on RIGHT face, RIGHT UE, RIGHT chest and LEFT LE.  - Coordination: Does not follow command  - Gait: Unable to assess d/t decreased level of consciousness        Labs:                        11.7   5.82  )-----------( 179      ( 05 Jul 2022 11:20 )             37.8     07-05    142  |  105  |  13  ----------------------------<  121<H>  4.4   |  23  |  0.83    Ca    9.9      05 Jul 2022 11:20    TPro  8.9<H>  /  Alb  4.7  /  TBili  0.2  /  DBili  x   /  AST  28  /  ALT  32  /  AlkPhos  52  07-05    CAPILLARY BLOOD GLUCOSE        LIVER FUNCTIONS - ( 05 Jul 2022 11:20 )  Alb: 4.7 g/dL / Pro: 8.9 g/dL / ALK PHOS: 52 U/L / ALT: 32 U/L / AST: 28 U/L / GGT: x             EEG REPORT 6/15/22  EEG SUMMARY/CLASSIFICATION    Normal EEG in the drowsy and asleep states.  _____________________________________________________________  EEG IMPRESSION/CLINICAL CORRELATE    Normal EEG study in the drowsy/sleep states.  No epileptiform pattern or seizure seen.  Study technically limited by lack of observation of awake state       EEG REPORT 5/23/22  **EEG SUMMARY/CLASSIFICATION**    Abnormal EEG in a sedated patient.  - Background slowing, generalized.  - Diffuse excess beta activity.  __________________________________________________________  **EEG IMPRESSION/CLINICAL CORRELATE**    Abnormal EEG study.    - Mild to Moderate nonspecific diffuse or multifocal cerebral dysfunction -- which may be due to sedation.  - Excess diffuse beta activity may be seen with benzodiazepines or sedative medications use.  - No epileptiform patterns or seizures recorded.      Radiology:  CT Head No Cont:  (05 Jul 2022 14:47)  FINDINGS:    No hydrocephalus, mass effect, midline shift, acute intracranial   hemorrhage, or brain edema.    No displaced calvarial fracture.    Visualized paranasal sinuses and mastoid air cells are clear.    IMPRESSION:    No acute intracranial hemorrhage, brain edema, or mass effect.  No displaced calvarial fracture.      MR Acute Spinal Cord Compression (07.05.22 @ 05:57)   Loss of the normal cervical and lumbar lordosis is seen.    The vertebral body height alignment and marrow signal appear normal    Disc desiccation is seen involving C2-C3 4 C4-5 C5-6 and T1-2 levels.   Similar findings are seen involving the L2-3 level. These findings are   secondary to chronic degenerative changes.    Disc bulging is seen involving the C3-4 C4-5 and C5-6 level. This is most   prominent at the C5-6 level. This finding can be better evaluated with   complete MRI of the cervical spine.    The spinal cord demonstrates normal signal and caliber with no spinal   cord compression identified.    IMPRESSION: Degenerative changes.    No spinal cord compression is seen.

## 2022-07-05 NOTE — ED PROCEDURE NOTE - NS_EDPROVIDERDISPOUSERTYPE_ED_A_ED
I have personally evaluated and examined the patient. The Attending was available to me as a supervising provider if needed.
Attending Attestation (For Attendings USE Only)...
Attending Attestation (For Attendings USE Only)...

## 2022-07-05 NOTE — H&P ADULT - NSICDXPASTMEDICALHX_GEN_ALL_CORE_FT
PAST MEDICAL HISTORY:  Asthma     Bipolar disorder     Endometriosis     Factor V Leiden     GERD (gastroesophageal reflux disease)     History of DVT in adulthood RLE on eliquis    Hypothyroid     Insomnia     Seasonal allergies     Seizure      yes

## 2022-07-05 NOTE — H&P ADULT - NSHPADDITIONALINFOADULT_GEN_ALL_CORE
37 F h/o obesity, factor V Leiden deficiency a/w DVT/PE on AC, seizures, bipolar disorder, hypothyroid, recently discharged from ED for shoulder abscess s/p I&D, however, while waiting for her ride home she was noted to have tonic-clonic seizure-like activity. CTH neg, tox screen positive for opioids. As per neurology recs, continue home valproate. Pt also with ? h/o pseudoseizures. Obtain 24 vEEG. Maintain seizure precautions. Pt still lethargic in ED, possible post-ictal state. Keep NPO for now pending SLP eval or until MS back to baseline. Rest of care as per resident note

## 2022-07-05 NOTE — H&P ADULT - ASSESSMENT
37 year old female w/ PMHx obesity, factor V Leiden deficiency a/w DVT/PE (apixaban 5mg BID), seizures a/w pseudoseizures (VPA ER 500mg QD, 1000mg qHS), bipolar disorder, endometriosis, hypothyroid, recently discharged from ED for shoulder abscess s/p I&D now presents with seizure like activity meeting criteria for status epilepticus.

## 2022-07-05 NOTE — ED PROVIDER NOTE - OBJECTIVE STATEMENT
Attending MD Vo.  Pt is a 38 yo female with pmhx of asthma, bipolar d/o, endometriosis, factor V leiden, GERD, hypothyroid, insomnia, seizure d/o who was discharged from ED on 7/1/22 after a visit for a shoulder abscess and had drainage by IR.  Pt then represented to ED yesterday with complaint of lower back pain worsening x 1 day.  Pt had MRI c/w chronic degenerative disease during that event without acutely actionable findings.  Pt was discharged and seated on a stretcher awaiting a ride when she endorsed dizziness to staff and was noted to start tonic-clonic seizure activity with truncal spasms, pupils 4-5mm non-reactive, involuntary nystagmus and lack of pupillary reflex/dolls eyes.  Pt without responsiveness during this event and noted to become tachypneic to 40's with shallow respirations.  Pt unknown to staff at time of event as she'd been discharged prior to shift change.  Pt moved into a room and 2 mg ativan administered to RUE IM with discontinuation of seizure activity with reduction in resp rate, return of pupilary reflexes, relaxation of trunk.  Please see MDM for rest of details of stay.

## 2022-07-05 NOTE — H&P ADULT - PROBLEM SELECTOR PLAN 1
History of seizures / pseudoseizures in the past now presenting with generalized tonic-clonic seizures meeting criteria for status epilepticus   - currently on valproic acid 500 mg qAM / 1000 mg qHS ; VPA level 7/5 50.50  - received total of 8 mg Ativan (6 mg IM and 2mg IV) in ED for convulsions   - toxicology screen positive for opioids in [facility]   - CT head negative for acute findings, hemorrhage, stroke   - neurology consulted for evaluation of seizures  - will continue home dose of VPA for now, hold off on further Keppra  - 24-hour video EEG ordered  - lorazepam 2mg IV PRN for seizure activity lasting >3-5mins, >2x/hr, >3x/day, or if GTC, repeat after 1 minute (max dose 0.1 mg/kg)   - seizure/aspiration precautions, NPO for now pending S/S evaluation History of seizures / pseudoseizures in the past now presenting with generalized tonic-clonic seizures meeting criteria for status epilepticus. Seizure-like activity in 6/2022 consistent with pseudoseizure on neuro eval. Unclear if current presentation is true seizures or pseudoseizures. CT head negative for acute hemorrhage/stroke/mass. UTox positive for opioids.  - s/p 8 mg Ativan (6 mg IM and 2mg IV) in ED for convulsions  - will continue home dose of VPA for now, hold off on further Keppra  - currently on valproic acid 500 mg qAM / 1000 mg qHS ; VPA level 7/5 50.50  - lorazepam 2mg IV PRN for seizure activity lasting >3-5mins, >2x/hr, >3x/day, or if GTC, repeat after 1 minute (max dose 0.1 mg/kg)   - neurology consulted for evaluation of seizures  - 24-hour video EEG ordered  - seizure/aspiration precautions, NPO for now pending S/S evaluation

## 2022-07-05 NOTE — H&P ADULT - PROBLEM SELECTOR PLAN 3
Hx of RLE DVT, on Eliquis 5mg BID outpatient   - c/w home eliquis when awake Hx of RLE DVT, on Eliquis 5mg BID outpatient   - continue home eliquis when awake

## 2022-07-05 NOTE — CONSULT NOTE ADULT - ASSESSMENT
37 year old female w/ PMHx obesity, factor V Leiden deficiency a/w DVT/PE (apixaban 5mg BID), seizures a/w pseudoseizures (VPA ER 500mg QD, 1000mg qHS), bipolar recently discharged from ED on 7/1/22 after a visit for shoulder abscess and had drainage by IR, who then presented again to ED 7/4/22 w/ complaint of lower back pain worsening over the prior day w/ MRI consistent w/ chronic degenerative disease during without acutely actionable findings. Patient was discharged and seated on a stretcher awaiting a ride when she endorsed dizziness to staff, states that she felt like she was going to have a seizure, and was noted to start having "tonic-clonic seizure-like activity with truncal spasms, pupils 4-5 mm non-reactive, involuntary nystagmus and lack of pupillary reflex / dolls eyes," per ED examination. Patient reportedly without responsiveness during this event, and noted to be tachypneic to the 40s w/ shallow respirations. Per RN reassessment note, patient was placed on NRB and cardiac monitor w/ HR in low 100s, sating at 100% on RA. Patient moved into a room and given lorazepam 2mg IM. Patient reportedly still seizing after a few minutes, so was given second dose of lorazepam 2mg IM. She was minimally responsive, but seized again, and another 2mg lorazepam was given. Patient started have tonic clonic movements again, and another lorazepam 2mg IV was given. Of note, patient was given levetiracetam 3,000mg load in the ED as well. VS in ED: /73, HR 76, afebrile. Limited examination s/p lorazepam 8mg; decreased level of consciousness; non-focal motor examination; reports decreased LT sensation R face, RUE, R chest, LLE. CTH 7/5/22: no acute findings. MRI spinal cord compression series: no spinal cord compression seen.    Impression: ***    Plan:    37 year old female w/ PMHx obesity, factor V Leiden deficiency a/w DVT/PE (apixaban 5mg BID), seizures a/w pseudoseizures (VPA ER 500mg QD, 1000mg qHS), bipolar recently discharged from ED on 7/1/22 after a visit for shoulder abscess and had drainage by IR, who then presented again to ED 7/4/22 w/ complaint of lower back pain worsening over the prior day w/ MRI consistent w/ chronic degenerative disease during without acutely actionable findings. Patient was discharged and seated on a stretcher awaiting a ride when she endorsed dizziness to staff, states that she felt like she was going to have a seizure, and was noted to start having "tonic-clonic seizure-like activity with truncal spasms, pupils 4-5 mm non-reactive, involuntary nystagmus and lack of pupillary reflex / dolls eyes," per ED examination. Patient reportedly without responsiveness during this event, and noted to be tachypneic to the 40s w/ shallow respirations. Per RN reassessment note, patient was placed on NRB and cardiac monitor w/ HR in low 100s, sating at 100% on RA. Patient moved into a room and given lorazepam 2mg IM. Patient reportedly still seizing after a few minutes, so was given second dose of lorazepam 2mg IM. She was minimally responsive, but seized again, and another 2mg lorazepam was given. Patient started have tonic clonic movements again, and another lorazepam 2mg IV was given. Of note, patient was given levetiracetam 3,000mg load in the ED as well. VS in ED: /73, HR 76, afebrile. Limited examination s/p lorazepam 8mg; decreased level of consciousness; non-focal motor examination; reports decreased LT sensation R face, RUE, R chest, LLE. CTH 7/5/22: no acute findings. MRI spinal cord compression series: no spinal cord compression seen.    Impression: ***      Plan  [] Video EEG.  [] Check VPA level, CBC, CMP, Mg, Phos.   [] Check blood cultures, urinalysis, drug and toxicology screen.  [] Okay w/ levetiracetam load in the ED. Would not continue on levetiracetam.  [] Continue with home valproic acid (can be IV or PO).  [] Seizure, fall and aspiration precautions. Avoid sleep deprivation.   [] Lorazepam 2mg IV PRN for seizure activity lasting >3-5mins, >2x/hr, >3x/day, or if GTC, repeat after 1 minute (max dose 0.1 mg/kg).      [] Please note: if patient has a convulsion, please document length of episode, specifically what patient was doing paying attention to eye opening vs closure, gaze deviation, shaking of extremities, tongue bite, urinary incontinence, any derangement of vital signs. Generalized motor seizures can have dilated and unreactive pupils, absent oculocephalic reflexes (no dolls eyes), and open eyelids (99% of cases). Head turning from sided to side, pelvic thrusting, bicycling movements, hand waving are NOT manifestations of generalized motor seizures.   37 year old female w/ PMHx obesity, factor V Leiden deficiency a/w DVT/PE (apixaban 5mg BID), seizures a/w pseudoseizures (VPA ER 500mg QD, 1000mg qHS), bipolar recently discharged on 7/2/22 after a visit for shoulder abscess and had drainage by IR, who then presented again to ED 7/4/22 w/ complaint of lower back pain worsening over the prior day w/ MRI consistent w/ chronic degenerative disease during without acutely actionable findings. Patient was discharged and seated on a stretcher awaiting a ride when she endorsed dizziness to staff, states that she felt like she was going to have a seizure, and was noted to start having "tonic-clonic seizure-like activity with truncal spasms, pupils 4-5 mm non-reactive, involuntary nystagmus and lack of pupillary reflex / dolls eyes," per ED examination. Patient reportedly without responsiveness during this event, and noted to be tachypneic to the 40s w/ shallow respirations. Per RN reassessment note, patient was placed on NRB and cardiac monitor w/ HR in low 100s, sating at 100% on RA. Patient moved into a room and given lorazepam 2mg IM. Patient reportedly still seizing after a few minutes, so was given second dose of lorazepam 2mg IM. She was minimally responsive, but seized again, and another 2mg lorazepam was given. Patient started have tonic clonic movements again, and another lorazepam 2mg IV was given. Of note, patient missed dose of VPA 7/4/22 PM. Patient given levetiracetam 3,000mg load in the ED as well. VS in ED: /73, HR 76, afebrile. Limited examination s/p lorazepam 8mg; decreased level of consciousness; non-focal motor examination; reports decreased LT sensation R face, RUE, R chest, LLE. CTH 7/5/22: no acute findings. MRI spinal cord compression series: no spinal cord compression seen. VPA level 57->50 on 7/5/22, albumin wnl.     Impression: Seizure-like activity concerning for breakthrough seizure / status epilepticus d/t unclear provoking factors. Missed dose of VPA x1, VPA level therapeutic, although on lower end of therapeutic range. No infectious signs at this time.       Plan  [] Video EEG.  [] Check VPA level, CBC, CMP, Mg, Phos.   [] Check blood cultures, urinalysis, drug and toxicology screen.  [] Okay w/ levetiracetam load in the ED. Would not continue on levetiracetam.  [] Continue with home valproic acid (can be IV or PO).  [] Seizure, fall and aspiration precautions. Avoid sleep deprivation.   [] Lorazepam 2mg IV PRN for seizure activity lasting >3-5mins, >2x/hr, >3x/day, or if GTC, repeat after 1 minute (max dose 0.1 mg/kg).      [] Please note: if patient has a convulsion, please document length of episode, specifically what patient was doing paying attention to eye opening vs closure, gaze deviation, shaking of extremities, tongue bite, urinary incontinence, any derangement of vital signs. Generalized motor seizures can have dilated and unreactive pupils, absent oculocephalic reflexes (no dolls eyes), and open eyelids (99% of cases). Head turning from sided to side, pelvic thrusting, bicycling movements, hand waving are NOT manifestations of generalized motor seizures.   37 year old female w/ PMHx obesity, factor V Leiden deficiency a/w DVT/PE (apixaban 5mg BID), seizures a/w pseudoseizures (VPA ER 500mg QD, 1000mg qHS), bipolar recently discharged on 7/2/22 after a visit for shoulder abscess and had drainage by IR, who then presented again to ED 7/4/22 w/ complaint of lower back pain worsening over the prior day w/ MRI consistent w/ chronic degenerative disease during without acutely actionable findings. Patient was discharged and seated on a stretcher awaiting a ride when she endorsed dizziness to staff, states that she felt like she was going to have a seizure, and was noted to start having "tonic-clonic seizure-like activity with truncal spasms, pupils 4-5 mm non-reactive, involuntary nystagmus and lack of pupillary reflex / dolls eyes," per ED examination. Patient reportedly without responsiveness during this event, and noted to be tachypneic to the 40s w/ shallow respirations. Per RN reassessment note, patient was placed on NRB and cardiac monitor w/ HR in low 100s, sating at 100% on RA. Patient moved into a room and given lorazepam 2mg IM. Patient reportedly still seizing after a few minutes, so was given second dose of lorazepam 2mg IM. She was minimally responsive, but seized again, and another 2mg lorazepam was given. Patient started have tonic clonic movements again, and another lorazepam 2mg IV was given. Of note, patient missed dose of VPA 7/4/22 PM. Patient given levetiracetam 3,000mg load in the ED as well. VS in ED: /73, HR 76, afebrile. Limited examination s/p lorazepam 8mg; decreased level of consciousness; non-focal motor examination; reports decreased LT sensation R face, RUE, R chest, LLE. CTH 7/5/22: no acute findings. MRI spinal cord compression series: no spinal cord compression seen. No epileptiform patterns or seizures on 6/15/22 and 5/23/22 EEGs. VPA level 57->50 on 7/5/22, albumin wnl.     Impression: Seizure-like activity concerning for breakthrough seizure / status epilepticus d/t unclear provoking factors. Missed dose of VPA x1, VPA level therapeutic, although on lower end of therapeutic range. No infectious signs at this time.       Plan  [] Video EEG.  [] Check VPA level, CBC, CMP, Mg, Phos.   [] Check blood cultures, urinalysis, drug and toxicology screen.  [] Okay w/ levetiracetam load in the ED. Would not continue on levetiracetam.  [] Continue with home valproic acid (can be IV or PO).  [] Seizure, fall and aspiration precautions. Avoid sleep deprivation.   [] Lorazepam 2mg IV PRN for seizure activity lasting >3-5mins, >2x/hr, >3x/day, or if GTC, repeat after 1 minute (max dose 0.1 mg/kg).      [] Please note: if patient has a convulsion, please document length of episode, specifically what patient was doing paying attention to eye opening vs closure, gaze deviation, shaking of extremities, tongue bite, urinary incontinence, any derangement of vital signs. Generalized motor seizures can have dilated and unreactive pupils, absent oculocephalic reflexes (no dolls eyes), and open eyelids (99% of cases). Head turning from sided to side, pelvic thrusting, bicycling movements, hand waving are NOT manifestations of generalized motor seizures.   37 year old female w/ PMHx obesity, factor V Leiden deficiency a/w DVT/PE (apixaban 5mg BID), seizures a/w ?pseudoseizures (VPA ER 500mg QD, 1000mg qHS), bipolar recently discharged on 7/2/22 after a visit for shoulder abscess and had drainage by IR, who then presented again to ED 7/4/22 w/ complaint of lower back pain worsening over the prior day w/ MRI consistent w/ chronic degenerative disease during without acutely actionable findings. Patient was discharged and seated on a stretcher awaiting a ride when she endorsed dizziness to staff, states that she felt like she was going to have a seizure, and was noted to start having "tonic-clonic seizure-like activity with truncal spasms, pupils 4-5 mm non-reactive, involuntary nystagmus and lack of pupillary reflex / dolls eyes," per ED examination. Patient reportedly without responsiveness during this event, and noted to be tachypneic to the 40s w/ shallow respirations. Per RN reassessment note, patient was placed on NRB and cardiac monitor w/ HR in low 100s, sating at 100% on RA. Patient moved into a room and given lorazepam 2mg IM. Patient reportedly still seizing after a few minutes, so was given second dose of lorazepam 2mg IM. She was minimally responsive, but seized again, and another 2mg lorazepam was given. Patient started have tonic clonic movements again, and another lorazepam 2mg IV was given. Of note, patient missed dose of VPA 7/4/22 PM. Patient given levetiracetam 3,000mg load in the ED as well. VS in ED: /73, HR 76, afebrile. Limited examination s/p lorazepam 8mg; decreased level of consciousness; non-focal motor examination; reports decreased LT sensation R face, RUE, R chest, LLE. CTH 7/5/22: no acute findings. MRI spinal cord compression series: no spinal cord compression seen. No epileptiform patterns or seizures on 6/15/22 and 5/23/22 EEGs. VPA level 57->50 on 7/5/22, albumin wnl.     Impression: Seizure-like activity concerning for breakthrough seizure / status epilepticus d/t unclear provoking factors. Missed dose of VPA x1, VPA level therapeutic, although on lower end of therapeutic range. No infectious signs at this time.       Plan  [] Video EEG.  [] Check VPA level, CBC, CMP, Mg, Phos.   [] Check blood cultures, urinalysis, drug and toxicology screen.  [] Okay w/ levetiracetam load in the ED. Would not continue on levetiracetam.  [] Continue with home valproic acid (can be IV or PO).  [] Seizure, fall and aspiration precautions. Avoid sleep deprivation.   [] Lorazepam 2mg IV PRN for seizure activity lasting >3-5mins, >2x/hr, >3x/day, or if GTC, repeat after 1 minute (max dose 0.1 mg/kg).      [] Please note: if patient has a convulsion, please document length of episode, specifically what patient was doing paying attention to eye opening vs closure, gaze deviation, shaking of extremities, tongue bite, urinary incontinence, any derangement of vital signs. Generalized motor seizures can have dilated and unreactive pupils, absent oculocephalic reflexes (no dolls eyes), and open eyelids (99% of cases). Head turning from sided to side, pelvic thrusting, bicycling movements, hand waving are NOT manifestations of generalized motor seizures.

## 2022-07-05 NOTE — H&P ADULT - PROBLEM SELECTOR PLAN 1
History of seizures / pseudoseizures in the past now presenting with generalized tonic-clonic seizures meeting criteria for status epilepticus   - currently on valproic acid 500 mg qAM / 1000 mg qHS ; VPA level 7/5 50.50  - received total of 8 mg Ativan (6 mg IM and 2mg IV) in ED for convulsions   - toxicology screen positive for opioids in UA   - CT head negative for acute findings, hemorrhage, stroke   - neurology consulted for evaluation of seizures  - will continue home dose of VPA for now, hold off on further Keppra  - 24-hour video EEG ordered  - lorazepam 2mg IV PRN for seizure activity lasting >3-5mins, >2x/hr, >3x/day, or if GTC, repeat after 1 minute (max dose 0.1 mg/kg)   - seizure/aspiration precautions, NPO for now pending S/S evaluation

## 2022-07-05 NOTE — PROVIDER CONTACT NOTE (OTHER) - ASSESSMENT
Per Dr. Pineda, patient is cleared and is able to return to their previous residence, [facility] [place].  called and spoke with  [employer] (P#[phone] ).   confirmed that patients mode of transportation is [employer].  Clinical provider is in agreement with [employer] back to group [place]. Verbal huddle regarding coordination of care completed with interdisciplinary team.    Transportation contacted [facility] P#[phone] and confirmed soonest ambulette  time is 12:00pm (Trip #148).  Dr. Pineda and patient informed. Per Dr. Pineda, patient is cleared and is able to return to their previous residence, [facility] [place].  called and spoke with  [employer] (P#[phone] ).   confirmed that patients mode of transportation is [employer] with supervision.  Clinical provider is in agreement with [employer] back to group [place]. Verbal huddle regarding coordination of care completed with interdisciplinary team.    Transportation contacted [facility] P#[phone] and confirmed soonest ambulette  time is 12:00pm (Trip #148).  Dr. Pineda and patient informed.

## 2022-07-05 NOTE — ED ADULT NURSE REASSESSMENT NOTE - NS ED NURSE REASSESS COMMENT FT1
Mobile Critical Care RN: patient arrives to TR-B from intake area, recently discharged. patient has a seizure with AMS, tachypnea and truncal twitching movement. patient arrives on NRB mask, responsive to pain, but unable to follow commands or answer questions. initial plan to intubate but airway remains patent and patient becoming increasingly responsive. L AC #20 placed, labs drawn and sent, patient given 3G of keppra IV.     patient is now in TR-B, increasingly alert, able to follow commands, moves all extremities. afebrile and c/o pain in the head and back. patient repositioned for comfort. O2Sat 100% on RA, weaned from NRB mask. NSR on tele monitor, hemodynamically stable. no c/o abdominal discomfort or pain. denies nausea/vomiting. voids to bedpan. skin intact.
pt resting at this time. WANG Smith notified about failed dysphagia screeening. pt unable to tolerate PO medication. Awaiting further orders at this time. Respirations even and unlabored. Lung sounds clear with equal chest rise bilaterally. Pt in NAD. will continue to monitor. admitted awaiting bed assignment

## 2022-07-05 NOTE — H&P ADULT - NSHPPHYSICALEXAM_GEN_ALL_CORE
VITALS:   T(C): 36.7 (07-05-22 @ 15:38), Max: 37.1 (07-04-22 @ 21:31)  HR: 53 (07-05-22 @ 15:38) (53 - 112)  BP: 103/69 (07-05-22 @ 15:38) (103/69 - 140/89)  RR: 16 (07-05-22 @ 15:38) (16 - 25)  SpO2: 100% (07-05-22 @ 15:38) (100% - 100%)    PHYSICAL EXAM:     GENERAL: NAD, lying in bed comfortably  HEAD:  Atraumatic, Normocephalic  EYES: EOMI, PERRLA, conjunctiva and sclera clear  ENT: Moist mucous membranes  NECK: Supple, No JVD  CHEST/LUNG: Clear to auscultation bilaterally; No rales, rhonchi, wheezing, or rubs. Unlabored respirations  HEART: Regular rate and rhythm; No murmurs, rubs, or gallops  ABDOMEN: normal bowel sounds; Soft, nontender, nondistended  EXTREMITIES:  2+ Peripheral Pulses, brisk capillary refill. No clubbing, cyanosis, or edema  Neurological:  A&Ox0, no focal deficits   SKIN: No rashes or lesions  PSYCH: normal affect and mood VITALS:   T(C): 36.7 (07-05-22 @ 15:38), Max: 37.1 (07-04-22 @ 21:31)  HR: 53 (07-05-22 @ 15:38) (53 - 112)  BP: 103/69 (07-05-22 @ 15:38) (103/69 - 140/89)  RR: 16 (07-05-22 @ 15:38) (16 - 25)  SpO2: 100% (07-05-22 @ 15:38) (100% - 100%)    PHYSICAL EXAM:     GENERAL: NAD, resting in bed, appears somnolent, responds to simple questions and instructions  HEAD: Atraumatic, Normocephalic  EYES: EOMI, PERRLA, conjunctiva and sclera clear  ENT: Moist mucous membranes  CHEST/LUNG: Clear to auscultation bilaterally; No rales, rhonchi, wheezing, or rubs. Unlabored respirations  HEART: Regular rate and rhythm; No murmurs, rubs, or gallops  ABDOMEN: Soft, nontender, nondistended  EXTREMITIES: No clubbing, cyanosis, or edema  Neurological: A&Ox2, no focal deficits   SKIN: No rashes or lesions

## 2022-07-05 NOTE — H&P ADULT - NSHPPHYSICALEXAM_GEN_ALL_CORE
VITALS:   T(C): 36.7 (07-05-22 @ 15:38), Max: 37.1 (07-04-22 @ 21:31)  HR: 53 (07-05-22 @ 15:38) (53 - 112)  BP: 103/69 (07-05-22 @ 15:38) (103/69 - 140/89)  RR: 16 (07-05-22 @ 15:38) (16 - 25)  SpO2: 100% (07-05-22 @ 15:38) (100% - 100%)    PHYSICAL EXAM:     GENERAL: NAD, lying in bed comfortably  HEAD:  Atraumatic, Normocephalic  EYES: EOMI, PERRLA, conjunctiva and sclera clear  ENT: Moist mucous membranes  NECK: Supple, No JVD  CHEST/LUNG: Clear to auscultation bilaterally; No rales, rhonchi, wheezing, or rubs. Unlabored respirations  HEART: Regular rate and rhythm; No murmurs, rubs, or gallops  ABDOMEN: normal bowel sounds; Soft, nontender, nondistended  EXTREMITIES:  2+ Peripheral Pulses, brisk capillary refill. No clubbing, cyanosis, or edema  Neurological:  A&Ox0, no focal deficits   SKIN: No rashes or lesions  PSYCH: normal affect and mood

## 2022-07-05 NOTE — ED PROVIDER NOTE - CLINICAL SUMMARY MEDICAL DECISION MAKING FREE TEXT BOX
Attending MD Vo.  Pt discharged from the department and noted to be seizing while on stretcher (no fall to floor).  Pt medical records reviewed and indicate hx of eliquis 2/2 factor V leiden.  Seizure has recurred several times without return to baseline.  PT received 6 mg total ativan IM and 2 mg ativan IV.  Pt takes depakote and did not receive this overnight which may be etiology of pt's status.  Planned CT head to assess for spontaneous intracranial hemorrhage vs. poss infectious etiologies.  Pt is afebrile and had no known new active infection beyond that mentioned above.  Pt returning to baseline at time of transfer of care to critical team under Juliane Arreola.  Initial plan to intubate and place L IO however pt has regained mental status sufficient to protect airway and has LUE peripheral line.  Will hold on intubation/IO and defer to new primary team and neuro recs.  Neuro consulted and to make recs.  Pt improved and now verbal and alert at time of signout to incoming team.

## 2022-07-05 NOTE — ED PROVIDER NOTE - PROGRESS NOTE DETAILS
Dr. Dela Cruz: pt was awaiting transport after ED visit that began last night, noted to staff that she felt like she was going to have seizure, then had altered consciousness, tachypnea and truncal twitching movement, lasted over 10 minutes with incomplete return to baseline mental status; pt was moved from Intake where this began to red [facility], where I discussed case with team that note pt begin to seize; pt arrived with airway intact but unresponsive, s/p lorazepam x 3 with continued seizure activity; initial plan was to intubate for status epilepticus, but during preparation pt began to cry and became awake, answering my questions; decision made not to intubate at this time, will give Keppra 3 gm for status epilepticus (?now resolved); neurology consulted and at bedside examining pt, agree with plan; will send VPA level to check because pt takes at home, but states that she did not take last night before coming to ED Dr. Dela Cruz: no further seizures in ED s/p receiving meds as above, including Keppra 3 gm IV; plan is for admission and likely EEG/further neuro evaluation

## 2022-07-05 NOTE — H&P ADULT - NSHPLABSRESULTS_GEN_ALL_CORE
LABS:                           11.7   5.82  )-----------( 179      ( 2022 11:20 )             37.8     07-05    142  |  105  |  13  ----------------------------<  121<H>  4.4   |  23  |  0.83    Ca    9.9      2022 11:20    TPro  8.9<H>  /  Alb  4.7  /  TBili  0.2  /  DBili  x   /  AST  28  /  ALT  32  /  AlkPhos  52  07-05              Urinalysis Basic - ( 2022 23:35 )    Color: Yellow / Appearance: Clear / S.037 / pH: x  Gluc: x / Ketone: Trace  / Bili: Negative / Urobili: 3 mg/dL   Blood: x / Protein: 30 mg/dL / Nitrite: Negative   Leuk Esterase: Negative / RBC: 1 /HPF / WBC 2 /HPF   Sq Epi: x / Non Sq Epi: Moderate / Bacteria: Occasional        LIVER FUNCTIONS - ( 2022 11:20 )  Alb: 4.7 g/dL / Pro: 8.9 g/dL / ALK PHOS: 52 U/L / ALT: 32 U/L / AST: 28 U/L / GGT: x                 I&O's Summary         /     CAPILLARY BLOOD GLUCOSE                MICRO: LABS:              11.7   5.82  )-----------( 179      ( 2022 11:20 )             37.8     07-05  142  |  105  |  13  ----------------------------<  121<H>  4.4   |  23  |  0.83    Ca    9.9      2022 11:20    TPro  8.9<H>  /  Alb  4.7  /  TBili  0.2  /  DBili  x   /  AST  28  /  ALT  32  /  AlkPhos  52  07-05    Urinalysis Basic - ( 2022 23:35 )    Color: Yellow / Appearance: Clear / S.037 / pH: x  Gluc: x / Ketone: Trace  / Bili: Negative / Urobili: 3 mg/dL   Blood: x / Protein: 30 mg/dL / Nitrite: Negative   Leuk Esterase: Negative / RBC: 1 /HPF / WBC 2 /HPF   Sq Epi: x / Non Sq Epi: Moderate / Bacteria: Occasional    LIVER FUNCTIONS - ( 2022 11:20 )  Alb: 4.7 g/dL / Pro: 8.9 g/dL / ALK PHOS: 52 U/L / ALT: 32 U/L / AST: 28 U/L / GGT: x

## 2022-07-05 NOTE — H&P ADULT - PROBLEM SELECTOR PLAN 2
History of extensive bipolar disorder on multiple anti-psychotic medications at home  - currently on multiple antipsychotics: High dose seroquel, abilify, chlorpromazine ,lurasidone, tizanidine  - based on previous BH assessment:   - Abilfy 30mg daily, Latuda 40mg at noon  - Depakote  daily and 1000 night   - Seroquel 200mg at 8am and 8pm and at 12PM   - Chlorpromazine 50mg 4x daily  - given patient appears too sedated after ativan will hold [facility] medications for now  -  consult in AM History of extensive bipolar disorder on multiple anti-psychotic medications at home. Hx notable for agitation and attempted elopement while admitted. Will plan to continue home medications pending inpatient behavioral health eval.  - Abilify 30mg QD, Latuda 40mg at noon  - Depakote ER 500mg QD and 1000mg nightly  - Seroquel 200mg at 8am and 8pm, 50mg at 12pm  - Chlorpromazine 50mg Q6H  - Behavioral health consult in AM

## 2022-07-05 NOTE — H&P ADULT - HISTORY OF PRESENT ILLNESS
37 year old female w/ PMHx obesity, factor V Leiden deficiency a/w DVT/PE (apixaban 5mg BID), seizures a/w pseudoseizures (VPA ER 500mg QD, 1000mg qHS), bipolar disorder, endometriosis, hypothyroid, recently discharged from ED on 7/1/22 after a visit for shoulder abscess and had drainage by IR, who then presented again to ED 7/4/22 w/ complaint of lower back pain worsening over the prior day w/ MRI consistent w/ chronic degenerative disease during without evidence of cord compression.     Patient was discharged and seated on a stretcher awaiting a ride when she endorsed dizziness to staff, states that she felt like she was going to have a seizure, and was noted to start having "tonic-clonic seizure-like activity with truncal spasms, pupils 4-5 mm non-reactive, involuntary nystagmus and lack of pupillary reflex / dolls eyes," per ED examination. Patient reportedly without responsiveness during this event, and noted to be tachypneic to the 40s w/ shallow respirations. Per RN reassessment note, patient was placed on NRB and cardiac monitor w/ HR in low 100s, sating at 100% on RA. Patient moved into a room and given lorazepam 2mg IM. Patient reportedly still seizing after a few minutes, so was given second dose of lorazepam 2mg IM. She was minimally responsive, but seized again, and another 2mg lorazepam was given. Patient started have tonic clonic movements again, and another lorazepam 2mg IV was given. Of note, patient was given levetiracetam 3,000mg load in the ED as well. MRI findings negative for cord compression. CT head No acute intracranial hemorrhage, brain edema, or mass effect.No displaced calvarial fracture.      Per chart review, patient seen by Neurologist Dr. Sanches in 6/15/22 for abnormal movements. At that time, that particular episode was consistent with her pseudoseizures. Patient follows with Dr. Byers in outpatient setting.

## 2022-07-05 NOTE — ED ADULT NURSE REASSESSMENT NOTE - NS ED NURSE REASSESS COMMENT FT1
safe discharge huddle : Group home contacted, as per Staff member Sigrid patient travels back to group [place] via [facility]. MD Nielson made aware. [facility] transport arranged, patient awaiting transport via [facility]. safe discharge huddle : Group home contacted, as per Staff member Sigrid patient travels back to group [place] via [facility]. MD Nielson made aware. [facility] transport arranged, patient awaiting transport via [facility]. trip# [id number], estimated pick-up time @ 12 noon.

## 2022-07-05 NOTE — CONSULT NOTE ADULT - ATTENDING COMMENTS
No further episodes of seizure.   Exam:  Lethargic. Oriented to self.  EOMI.  Moving all ext sym.     A/P  Ms. Marte is a 36 yo woman with h/o epilepsy - nonepileptic spells vs. focal epilepsy or both.   Continuous EEG  Continue home AED for now.   Consider Tx to EMU if bed available.    Thank you

## 2022-07-05 NOTE — ED PROVIDER NOTE - NEUROLOGICAL LEVEL OF CONSCIOUSNESS
All extremities with spontaneous subtle movement, no intentional movement during initial exam as pt is actively seizing, no response to pain when seizing, responds to pain in all 4 extremities when not actively seizing, moving all 4 extremities spontaneously when not actively seizing

## 2022-07-05 NOTE — H&P ADULT - HISTORY OF PRESENT ILLNESS
37 year old female w/ PMHx obesity, factor V Leiden deficiency a/w DVT/PE (apixaban 5mg BID), seizures a/w pseudoseizures (VPA ER 500mg QD, 1000mg qHS), bipolar disorder, endometriosis, hypothyroid, recently discharged from ED on 7/1/22 after a visit for shoulder abscess and had drainage by IR, who then presented again to ED 7/4/22 w/ complaint of lower back pain worsening over the prior day w/ MRI consistent w/ chronic degenerative disease during without evidence of cord compression.     Patient was discharged and seated on a stretcher awaiting a ride when she endorsed dizziness to staff, states that she felt like she was going to have a seizure, and was noted to start having "tonic-clonic seizure-like activity with truncal spasms, pupils 4-5 mm non-reactive, involuntary nystagmus and lack of pupillary reflex / dolls eyes," per ED examination. Patient reportedly without responsiveness during this event, and noted to be tachypneic to the 40s w/ shallow respirations. Per RN reassessment note, patient was placed on NRB and cardiac monitor w/ HR in low 100s, sating at 100% on RA. Patient moved into a room and given lorazepam 2mg IM. Patient reportedly still seizing after a few minutes, so was given second dose of lorazepam 2mg IM. She was minimally responsive, but seized again, and another 2mg lorazepam was given. Patient started have tonic clonic movements again, and another lorazepam 2mg IV was given. Of note, patient was given levetiracetam 3,000mg load in the ED as well. MRI findings negative for cord compression. CT head No acute intracranial hemorrhage, brain edema, or mass effect.No displaced calvarial fracture.      Per chart review, patient seen by Neurologist Dr. Sanches in 6/15/22 for abnormal movements. At that time, that particular episode was consistent with her pseudoseizures. Patient follows with Dr. Byers in outpatient setting.  37 year old female w/ PMHx obesity, factor V Leiden deficiency a/w DVT/PE (apixaban 5mg BID), seizures a/w pseudoseizures (VPA ER 500mg QD, 1000mg qHS), bipolar disorder, endometriosis, hypothyroid, recently discharged from ED on 7/1/22 after a visit for shoulder abscess and had drainage by IR, who then presented again to ED 7/4/22 w/ complaint of lower back pain worsening over the prior day w/ MRI consistent w/ chronic degenerative disease during without evidence of cord compression.     Patient was discharged and seated on a stretcher awaiting a ride when she endorsed dizziness to staff, states that she felt like she was going to have a seizure, and was noted to start having "tonic-clonic seizure-like activity with truncal spasms, pupils 4-5 mm non-reactive, involuntary nystagmus and lack of pupillary reflex / dolls eyes," per ED examination. Patient reportedly without responsiveness during this event, and noted to be tachypneic to the 40s w/ shallow respirations. Per RN reassessment note, patient was placed on NRB and cardiac monitor w/ HR in low 100s, sating at 100% on RA. Patient moved into a room and given lorazepam 2mg IM. Patient reportedly still seizing after a few minutes, so was given second dose of lorazepam 2mg IM. She was minimally responsive, but seized again, and another 2mg lorazepam was given. Patient started have tonic clonic movements again, and another lorazepam 2mg IV was given. Of note, patient was given levetiracetam 3,000mg load in the ED as well. MRI findings negative for cord compression. CT head No acute intracranial hemorrhage, brain edema, or mass effect.No displaced calvarial fracture.    Per chart review, patient seen by Neurologist Dr. Sanches in 6/15/22 for abnormal movements. At that time, that particular episode was consistent with her pseudoseizures. Patient follows with Dr. Byers in outpatient setting.

## 2022-07-06 DIAGNOSIS — F63.81 INTERMITTENT EXPLOSIVE DISORDER: ICD-10-CM

## 2022-07-06 DIAGNOSIS — F79 UNSPECIFIED INTELLECTUAL DISABILITIES: ICD-10-CM

## 2022-07-06 LAB
CULTURE RESULTS: SIGNIFICANT CHANGE UP
SPECIMEN SOURCE: SIGNIFICANT CHANGE UP

## 2022-07-06 PROCEDURE — 90792 PSYCH DIAG EVAL W/MED SRVCS: CPT

## 2022-07-06 PROCEDURE — 99232 SBSQ HOSP IP/OBS MODERATE 35: CPT | Mod: GC

## 2022-07-06 PROCEDURE — 99223 1ST HOSP IP/OBS HIGH 75: CPT

## 2022-07-06 RX ORDER — VALPROIC ACID (AS SODIUM SALT) 250 MG/5ML
500 SOLUTION, ORAL ORAL EVERY 24 HOURS
Refills: 0 | Status: DISCONTINUED | OUTPATIENT
Start: 2022-07-07 | End: 2022-07-08

## 2022-07-06 RX ORDER — SENNA PLUS 8.6 MG/1
2 TABLET ORAL AT BEDTIME
Refills: 0 | Status: DISCONTINUED | OUTPATIENT
Start: 2022-07-06 | End: 2022-07-08

## 2022-07-06 RX ORDER — POLYETHYLENE GLYCOL 3350 17 G/17G
17 POWDER, FOR SOLUTION ORAL ONCE
Refills: 0 | Status: COMPLETED | OUTPATIENT
Start: 2022-07-06 | End: 2022-07-06

## 2022-07-06 RX ORDER — OXYCODONE HYDROCHLORIDE 5 MG/1
5 TABLET ORAL ONCE
Refills: 0 | Status: DISCONTINUED | OUTPATIENT
Start: 2022-07-06 | End: 2022-07-06

## 2022-07-06 RX ORDER — DIPHENHYDRAMINE HCL 50 MG
50 CAPSULE ORAL ONCE
Refills: 0 | Status: COMPLETED | OUTPATIENT
Start: 2022-07-06 | End: 2022-07-06

## 2022-07-06 RX ORDER — VALPROIC ACID (AS SODIUM SALT) 250 MG/5ML
1000 SOLUTION, ORAL ORAL EVERY 24 HOURS
Refills: 0 | Status: DISCONTINUED | OUTPATIENT
Start: 2022-07-06 | End: 2022-07-08

## 2022-07-06 RX ADMIN — APIXABAN 5 MILLIGRAM(S): 2.5 TABLET, FILM COATED ORAL at 18:49

## 2022-07-06 RX ADMIN — PANTOPRAZOLE SODIUM 40 MILLIGRAM(S): 20 TABLET, DELAYED RELEASE ORAL at 07:33

## 2022-07-06 RX ADMIN — Medication 50 MILLIGRAM(S): at 22:27

## 2022-07-06 RX ADMIN — Medication 75 MICROGRAM(S): at 07:33

## 2022-07-06 RX ADMIN — QUETIAPINE FUMARATE 200 MILLIGRAM(S): 200 TABLET, FILM COATED ORAL at 19:04

## 2022-07-06 RX ADMIN — LURASIDONE HYDROCHLORIDE 40 MILLIGRAM(S): 40 TABLET ORAL at 13:58

## 2022-07-06 RX ADMIN — ARIPIPRAZOLE 30 MILLIGRAM(S): 15 TABLET ORAL at 13:31

## 2022-07-06 RX ADMIN — Medication 55 MILLIGRAM(S): at 07:33

## 2022-07-06 RX ADMIN — OXYCODONE HYDROCHLORIDE 5 MILLIGRAM(S): 5 TABLET ORAL at 19:45

## 2022-07-06 RX ADMIN — Medication 50 MILLIGRAM(S): at 07:35

## 2022-07-06 RX ADMIN — SENNA PLUS 2 TABLET(S): 8.6 TABLET ORAL at 12:08

## 2022-07-06 RX ADMIN — OXYCODONE HYDROCHLORIDE 5 MILLIGRAM(S): 5 TABLET ORAL at 19:03

## 2022-07-06 RX ADMIN — Medication 650 MILLIGRAM(S): at 19:00

## 2022-07-06 RX ADMIN — APIXABAN 5 MILLIGRAM(S): 2.5 TABLET, FILM COATED ORAL at 07:33

## 2022-07-06 RX ADMIN — QUETIAPINE FUMARATE 50 MILLIGRAM(S): 200 TABLET, FILM COATED ORAL at 13:39

## 2022-07-06 RX ADMIN — Medication 50 MILLIGRAM(S): at 12:08

## 2022-07-06 RX ADMIN — QUETIAPINE FUMARATE 200 MILLIGRAM(S): 200 TABLET, FILM COATED ORAL at 07:34

## 2022-07-06 RX ADMIN — Medication 1000 MILLIGRAM(S): at 19:50

## 2022-07-06 RX ADMIN — Medication 650 MILLIGRAM(S): at 18:49

## 2022-07-06 NOTE — BH CONSULTATION LIAISON ASSESSMENT NOTE - NSBHCHARTREVIEWVS_PSY_A_CORE FT
Vital Signs Last 24 Hrs  T(C): 36.6 (06 Jul 2022 02:33), Max: 36.7 (05 Jul 2022 15:38)  T(F): 97.8 (06 Jul 2022 02:33), Max: 98 (05 Jul 2022 15:38)  HR: 75 (06 Jul 2022 02:33) (53 - 115)  BP: 109/69 (06 Jul 2022 02:33) (92/65 - 111/72)  BP(mean): --  RR: 16 (06 Jul 2022 02:33) (15 - 17)  SpO2: 100% (06 Jul 2022 02:33) (100% - 100%)

## 2022-07-06 NOTE — BH CONSULTATION LIAISON ASSESSMENT NOTE - NSBHCHARTREVIEWLAB_PSY_A_CORE FT
11.7   5.82  )-----------( 179      ( 05 Jul 2022 11:20 )             37.8   07-05    142  |  105  |  13  ----------------------------<  121<H>  4.4   |  23  |  0.83    Ca    9.9      05 Jul 2022 11:20    TPro  8.9<H>  /  Alb  4.7  /  TBili  0.2  /  DBili  x   /  AST  28  /  ALT  32  /  AlkPhos  52  07-05

## 2022-07-06 NOTE — PROGRESS NOTE ADULT - PROBLEM SELECTOR PLAN 2
History of extensive bipolar disorder on multiple anti-psychotic medications at home. Hx notable for agitation and attempted elopement while admitted. Will plan to continue home medications pending inpatient behavioral health eval.  - Abilify 30mg QD, Latuda 40mg at noon  - Depakote ER 500mg QD and 1000mg nightly  - Seroquel 200mg at 8am and 8pm, 50mg at 12pm  - Chlorpromazine 50mg Q6H  - Behavioral health consult today

## 2022-07-06 NOTE — BH CONSULTATION LIAISON ASSESSMENT NOTE - NSBHMSESPABN_PSY_A_CORE
Decreased productivity/Increased latency Soft volume/Slowed rate/Decreased productivity/Increased latency

## 2022-07-06 NOTE — SWALLOW BEDSIDE ASSESSMENT ADULT - COMMENTS
H&P "37 year old female w/ PMHx obesity, factor V Leiden deficiency a/w DVT/PE (apixaban 5mg BID), seizures a/w pseudoseizures (VPA ER 500mg QD, 1000mg qHS), bipolar disorder, endometriosis, hypothyroid, recently discharged from ED on 7/1/22 after a visit for shoulder abscess and had drainage by IR, who then presented again to ED 7/4/22 w/ complaint of lower back pain worsening over the prior day w/ MRI consistent w/ chronic degenerative disease during without evidence of cord compression."    CT Head 7/5 "No acute intracranial hemorrhage, brain edema, or mass effect.No displaced calvarial fracture."    Patient seen at bedside this AM for an initial assessment of the swallow function, at which time patient was alert and cooperative. Patient able to follow directives and verbalize wants/needs. Patient denies pain or difficulty swallowing pre/post today's assessment.
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
## 2022-07-06 NOTE — PATIENT PROFILE ADULT - FALL HARM RISK - HARM RISK INTERVENTIONS
Assistance with ambulation/Assistance OOB with selected safe patient handling equipment/Communicate Risk of Fall with Harm to all staff/Monitor for mental status changes/Monitor gait and stability/Reinforce activity limits and safety measures with patient and family/Review medications for side effects contributing to fall risk/Sit up slowly, dangle for a short time, stand at bedside before walking/Tailored Fall Risk Interventions/Toileting schedule using arm’s reach rule for commode and bathroom/Visual Cue: Yellow wristband and red socks/Bed in lowest position, wheels locked, appropriate side rails in place/Call bell, personal items and telephone in reach/Instruct patient to call for assistance before getting out of bed or chair/Non-slip footwear when patient is out of bed/[place] to call system/Physically safe environment - no spills, clutter or unnecessary equipment/Purposeful Proactive Rounding/Room/bathroom lighting operational, light cord in reach

## 2022-07-06 NOTE — BH CONSULTATION LIAISON ASSESSMENT NOTE - CURRENT MEDICATION
MEDICATIONS  (STANDING):  apixaban 5 milliGRAM(s) Oral two times a day  ARIPiprazole 30 milliGRAM(s) Oral daily  chlorproMAZINE    Tablet 50 milliGRAM(s) Oral <User Schedule>  levothyroxine 75 MICROGram(s) Oral daily  lurasidone 40 milliGRAM(s) Oral daily  pantoprazole    Tablet 40 milliGRAM(s) Oral before breakfast  QUEtiapine 200 milliGRAM(s) Oral <User Schedule>  QUEtiapine 50 milliGRAM(s) Oral <User Schedule>  senna 2 Tablet(s) Oral at bedtime  valproic acid 1000 milliGRAM(s) Oral daily    MEDICATIONS  (PRN):  acetaminophen     Tablet .. 650 milliGRAM(s) Oral every 6 hours PRN Temp greater or equal to 38C (100.4F), Mild Pain (1 - 3)  aluminum hydroxide/magnesium hydroxide/simethicone Suspension 30 milliLiter(s) Oral every 4 hours PRN Dyspepsia  melatonin 3 milliGRAM(s) Oral at bedtime PRN Insomnia

## 2022-07-06 NOTE — PROGRESS NOTE ADULT - PROBLEM SELECTOR PLAN 5
Diet: regular diet  DVT: home eliquis    Case mangbi Carol: [phone]    Legal guardianship :   of Case Management Maria Elena Armijo: [phone]   Cliff Carlton Raza ([phone]),

## 2022-07-06 NOTE — BH CONSULTATION LIAISON ASSESSMENT NOTE - NSBHATTESTCOMMENTATTENDFT_PSY_A_CORE
Chart reviewed, including previous C/L psychiatry notes, pt. seen/evaluated with Lori Paul NP, I agree with above assessment/plan. Patient calm, seems somewhat drowsy/lethargic, denies SI and HI, denies AVH. Interview rather limited at this time. Plan as above, will follow
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
## 2022-07-06 NOTE — BH CONSULTATION LIAISON ASSESSMENT NOTE - NSBHCHARTREVIEWINVESTIGATE_PSY_A_CORE FT
< from: 12 Lead ECG (06.20.22 @ 13:43) >      Ventricular Rate 85 BPM    Atrial Rate 85 BPM    P-R Interval 166 ms    QRS Duration 82 ms    Q-T Interval 376 ms    QTC Calculation(Bazett) 447 ms    P Axis 44 degrees    R Axis 52 degrees    T Axis 67 degrees    Diagnosis Line Normal sinus rhythm  Normal ECG    < end of copied text >

## 2022-07-06 NOTE — SWALLOW BEDSIDE ASSESSMENT ADULT - ADDITIONAL RECOMMENDATIONS
1. Monitor for any changes in neurological status that may impact oral intake. 2. This service remains available as needed.

## 2022-07-06 NOTE — BH CONSULTATION LIAISON ASSESSMENT NOTE - NSBHATTESTAPPAMEND_PSY_A_CORE
I have personally seen and examined this patient. I fully participated in the care of this patient. I have made amendments to the documentation where appropriate and otherwise agree with the history, physical exam, and plan as documented by the MISAEL

## 2022-07-06 NOTE — CHART NOTE - NSCHARTNOTEFT_GEN_A_CORE
EEG preliminary read (not final) on the initial recording hour(s) = x 4    No spike or seizures recorded.  Final report to follow tomorrow morning after completion of study.    [facility] EEG Reading Room Ph#: [phone]  Epilepsy Answering Service after 5PM and before 8:30AM: Ph#: [phone]

## 2022-07-06 NOTE — PROGRESS NOTE ADULT - PROBLEM SELECTOR PLAN 1
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 Seizure-like activity in 6/2022 consistent with pseudoseizure on neuro eval. Unclear if current presentation is true seizures or pseudoseizures. CT head negative for acute hemorrhage/stroke/mass. UTox positive for opioids. Neurology consulted, appreciate recs.  - s/p 8 mg Ativan (6 mg IM and 2mg IV) in ED for convulsions  - will continue home dose of VPA for now, hold off on further Keppra  - currently on valproic acid 500 mg qAM / 1000 mg qHS ; VPA level 7/5 50.50  - lorazepam 2mg IV PRN for seizure activity lasting >3-5mins, >2x/hr, >3x/day, or if GTC, repeat after 1 minute (max dose 0.1 mg/kg)  - 24-hour video EEG ordered  - seizure/fall/aspiration precautions, NPO for now pending S/S evaluation

## 2022-07-06 NOTE — PROGRESS NOTE ADULT - SUBJECTIVE AND OBJECTIVE BOX
Internal Medicine   Javad Yoder | PGY-1    OVERNIGHT EVENTS: No acute overnight events.    SUBJECTIVE:   Feels tired and weak. Endorses constant headache localized to forehead and eye pain. Notes her "foot is scrunched up" and asks if the bed can be longer. Is hungry and would like to eat breakfast. Denies fever, chest pain, shortness of breath.    MEDICATIONS  (STANDING):  apixaban 5 milliGRAM(s) Oral two times a day  ARIPiprazole 30 milliGRAM(s) Oral daily  chlorproMAZINE    Tablet 50 milliGRAM(s) Oral <User Schedule>  levothyroxine 75 MICROGram(s) Oral daily  lurasidone 40 milliGRAM(s) Oral daily  pantoprazole    Tablet 40 milliGRAM(s) Oral before breakfast  QUEtiapine 200 milliGRAM(s) Oral <User Schedule>  QUEtiapine 50 milliGRAM(s) Oral <User Schedule>  senna 2 Tablet(s) Oral at bedtime  valproic acid 1000 milliGRAM(s) Oral daily    MEDICATIONS  (PRN):  acetaminophen     Tablet .. 650 milliGRAM(s) Oral every 6 hours PRN Temp greater or equal to 38C (100.4F), Mild Pain (1 - 3)  aluminum hydroxide/magnesium hydroxide/simethicone Suspension 30 milliLiter(s) Oral every 4 hours PRN Dyspepsia  melatonin 3 milliGRAM(s) Oral at bedtime PRN Insomnia    VITALS:  T(F): 97.8 (07-06-22 @ 02:33), Max: 98 (07-05-22 @ 15:38)  HR: 75 (07-06-22 @ 02:33) (53 - 115)  BP: 109/69 (07-06-22 @ 02:33) (92/65 - 111/72)  BP(mean): --  RR: 16 (07-06-22 @ 02:33) (15 - 17)  SpO2: 100% (07-06-22 @ 02:33) (100% - 100%)    PHYSICAL EXAM:   GENERAL: NAD, resting in bed, moves slowly  HEAD: Atraumatic, normocephalic  EYES: prominent ptosis, EOMI, conjunctiva and sclera clear, no nystagmus noted  ENT: Moist mucous membranes  CHEST/LUNG: Clear to auscultation bilaterally; no rales, rhonchi, wheezing, or rubs; unlabored respirations  HEART: Regular rate and rhythm; no murmurs, rubs, or gallops, normal S1/S2  ABDOMEN: Normal bowel sounds; soft, nontender, nondistended, no organomegaly   EXTREMITIES:  No clubbing, cyanosis, or edema  MSK: No gross deformities noted   Neurological: No focal deficits   SKIN: No rashes or lesions  PSYCH: restricted affect, mood "good"    LABS:                      11.7   5.82  )-----------( 179      ( 05 Jul 2022 11:20 )             37.8     07-05  142  |  105  |  13  ----------------------------<  121<H>  4.4   |  23  |  0.83    Ca    9.9      05 Jul 2022 11:20    TPro  8.9<H>  /  Alb  4.7  /  TBili  0.2  /  DBili  x   /  AST  28  /  ALT  32  /  AlkPhos  52  07-05    Creatinine Trend: 0.83<--, 0.89<--, 0.87<--, 0.82<--, 0.77<--, 0.80<--

## 2022-07-06 NOTE — BH CONSULTATION LIAISON ASSESSMENT NOTE - OTHER
lethargic  limited domiciled in [facility]  concrete/goal directed

## 2022-07-07 ENCOUNTER — TRANSCRIPTION ENCOUNTER (OUTPATIENT)
Age: 37
End: 2022-07-07

## 2022-07-07 PROCEDURE — 99232 SBSQ HOSP IP/OBS MODERATE 35: CPT | Mod: GC

## 2022-07-07 PROCEDURE — 99231 SBSQ HOSP IP/OBS SF/LOW 25: CPT

## 2022-07-07 RX ORDER — HYDROCORTISONE 1 %
1 OINTMENT (GRAM) TOPICAL ONCE
Refills: 0 | Status: DISCONTINUED | OUTPATIENT
Start: 2022-07-07 | End: 2022-07-08

## 2022-07-07 RX ORDER — LANOLIN ALCOHOL/MO/W.PET/CERES
3 CREAM (GRAM) TOPICAL ONCE
Refills: 0 | Status: COMPLETED | OUTPATIENT
Start: 2022-07-07 | End: 2022-07-07

## 2022-07-07 RX ORDER — LURASIDONE HYDROCHLORIDE 40 MG/1
1 TABLET ORAL
Qty: 0 | Refills: 0 | DISCHARGE
Start: 2022-07-07

## 2022-07-07 RX ORDER — MECLIZINE HCL 12.5 MG
25 TABLET ORAL ONCE
Refills: 0 | Status: COMPLETED | OUTPATIENT
Start: 2022-07-07 | End: 2022-07-07

## 2022-07-07 RX ORDER — OXYCODONE HYDROCHLORIDE 5 MG/1
5 TABLET ORAL ONCE
Refills: 0 | Status: DISCONTINUED | OUTPATIENT
Start: 2022-07-07 | End: 2022-07-07

## 2022-07-07 RX ORDER — QUETIAPINE FUMARATE 200 MG/1
1 TABLET, FILM COATED ORAL
Qty: 0 | Refills: 0 | DISCHARGE
Start: 2022-07-07

## 2022-07-07 RX ORDER — VALPROIC ACID (AS SODIUM SALT) 250 MG/5ML
4 SOLUTION, ORAL ORAL
Qty: 0 | Refills: 0 | DISCHARGE
Start: 2022-07-07

## 2022-07-07 RX ORDER — DIPHENHYDRAMINE HCL 50 MG
25 CAPSULE ORAL ONCE
Refills: 0 | Status: COMPLETED | OUTPATIENT
Start: 2022-07-07 | End: 2022-07-07

## 2022-07-07 RX ORDER — ACETAMINOPHEN 500 MG
1000 TABLET ORAL ONCE
Refills: 0 | Status: COMPLETED | OUTPATIENT
Start: 2022-07-07 | End: 2022-07-07

## 2022-07-07 RX ORDER — ACETAMINOPHEN 500 MG
325 TABLET ORAL ONCE
Refills: 0 | Status: COMPLETED | OUTPATIENT
Start: 2022-07-07 | End: 2022-07-07

## 2022-07-07 RX ORDER — ACETAMINOPHEN 500 MG
650 TABLET ORAL ONCE
Refills: 0 | Status: COMPLETED | OUTPATIENT
Start: 2022-07-07 | End: 2022-07-07

## 2022-07-07 RX ORDER — LIDOCAINE 4 G/100G
1 CREAM TOPICAL DAILY
Refills: 0 | Status: DISCONTINUED | OUTPATIENT
Start: 2022-07-07 | End: 2022-07-08

## 2022-07-07 RX ORDER — VALPROIC ACID (AS SODIUM SALT) 250 MG/5ML
2 SOLUTION, ORAL ORAL
Qty: 0 | Refills: 0 | DISCHARGE
Start: 2022-07-07

## 2022-07-07 RX ADMIN — QUETIAPINE FUMARATE 200 MILLIGRAM(S): 200 TABLET, FILM COATED ORAL at 07:13

## 2022-07-07 RX ADMIN — Medication 325 MILLIGRAM(S): at 22:15

## 2022-07-07 RX ADMIN — Medication 2 MILLIGRAM(S): at 02:54

## 2022-07-07 RX ADMIN — ARIPIPRAZOLE 30 MILLIGRAM(S): 15 TABLET ORAL at 12:22

## 2022-07-07 RX ADMIN — QUETIAPINE FUMARATE 50 MILLIGRAM(S): 200 TABLET, FILM COATED ORAL at 12:21

## 2022-07-07 RX ADMIN — Medication 400 MILLIGRAM(S): at 02:12

## 2022-07-07 RX ADMIN — Medication 650 MILLIGRAM(S): at 22:15

## 2022-07-07 RX ADMIN — LIDOCAINE 1 PATCH: 4 CREAM TOPICAL at 12:27

## 2022-07-07 RX ADMIN — Medication 3 MILLIGRAM(S): at 21:24

## 2022-07-07 RX ADMIN — APIXABAN 5 MILLIGRAM(S): 2.5 TABLET, FILM COATED ORAL at 07:13

## 2022-07-07 RX ADMIN — Medication 25 MILLIGRAM(S): at 17:50

## 2022-07-07 RX ADMIN — OXYCODONE HYDROCHLORIDE 5 MILLIGRAM(S): 5 TABLET ORAL at 10:30

## 2022-07-07 RX ADMIN — SENNA PLUS 2 TABLET(S): 8.6 TABLET ORAL at 21:27

## 2022-07-07 RX ADMIN — Medication 650 MILLIGRAM(S): at 21:24

## 2022-07-07 RX ADMIN — Medication 500 MILLIGRAM(S): at 07:13

## 2022-07-07 RX ADMIN — PANTOPRAZOLE SODIUM 40 MILLIGRAM(S): 20 TABLET, DELAYED RELEASE ORAL at 07:13

## 2022-07-07 RX ADMIN — Medication 325 MILLIGRAM(S): at 21:24

## 2022-07-07 RX ADMIN — OXYCODONE HYDROCHLORIDE 5 MILLIGRAM(S): 5 TABLET ORAL at 03:42

## 2022-07-07 RX ADMIN — OXYCODONE HYDROCHLORIDE 5 MILLIGRAM(S): 5 TABLET ORAL at 09:59

## 2022-07-07 RX ADMIN — OXYCODONE HYDROCHLORIDE 5 MILLIGRAM(S): 5 TABLET ORAL at 04:12

## 2022-07-07 RX ADMIN — OXYCODONE HYDROCHLORIDE 5 MILLIGRAM(S): 5 TABLET ORAL at 16:40

## 2022-07-07 RX ADMIN — LIDOCAINE 1 PATCH: 4 CREAM TOPICAL at 19:10

## 2022-07-07 RX ADMIN — APIXABAN 5 MILLIGRAM(S): 2.5 TABLET, FILM COATED ORAL at 17:50

## 2022-07-07 RX ADMIN — Medication 2 MILLIGRAM(S): at 19:15

## 2022-07-07 RX ADMIN — Medication 75 MICROGRAM(S): at 07:13

## 2022-07-07 RX ADMIN — QUETIAPINE FUMARATE 200 MILLIGRAM(S): 200 TABLET, FILM COATED ORAL at 21:24

## 2022-07-07 RX ADMIN — Medication 25 MILLIGRAM(S): at 12:20

## 2022-07-07 RX ADMIN — Medication 1000 MILLIGRAM(S): at 02:27

## 2022-07-07 RX ADMIN — LURASIDONE HYDROCHLORIDE 40 MILLIGRAM(S): 40 TABLET ORAL at 13:04

## 2022-07-07 RX ADMIN — Medication 1000 MILLIGRAM(S): at 21:36

## 2022-07-07 RX ADMIN — OXYCODONE HYDROCHLORIDE 5 MILLIGRAM(S): 5 TABLET ORAL at 15:44

## 2022-07-07 NOTE — PROVIDER CONTACT NOTE (OTHER) - ASSESSMENT
Pt expresses pain rating 8 out fo 10 for lower back. No signs of acute distress. No SOB or chest pain.
Pt is not being discharged as pt hd seizure while in the ED.  SW called [facility]  Supervisor Belkis PORTILLO#[phone] and left a msg stating pt is not for DC at this time.
Pt agitated, code flight called. IM ativan given. 8pm Depaoke and Seroquel not given as pt is refusing

## 2022-07-07 NOTE — PROGRESS NOTE ADULT - PROBLEM SELECTOR PLAN 5
Diet: regular diet  DVT: home eliquis    Case mangbi Carol: [phone]    Legal guardianship :   of Case Management Maria Elena Armijo: [phone]   Cliff Carlton Raza ([phone]),

## 2022-07-07 NOTE — DISCHARGE NOTE PROVIDER - NSDCCPCAREPLAN_GEN_ALL_CORE_FT
PRINCIPAL DISCHARGE DIAGNOSIS  Diagnosis: Status epilepticus  Assessment and Plan of Treatment:        PRINCIPAL DISCHARGE DIAGNOSIS  Diagnosis: Status epilepticus  Assessment and Plan of Treatment: You were admitted to the hospital because you had seizure that lasted more than one seizure within a short time period, this is called status epilepticus, you were given medication to help stop the seizures in the ED. Status epilepticus is a medical emergency. Please make sure your medication every day at the same time. This will help prevent seizures in the future. Do not skip any doses or take less than directed, and avoid your seizure triggers. You will be transferred to an epilepsy monitering unit for further management on the seizures.       PRINCIPAL DISCHARGE DIAGNOSIS  Diagnosis: Status epilepticus  Assessment and Plan of Treatment: You were admitted to the hospital because you had seizure that lasted more than one seizure within a short time period, this is called status epilepticus, you were given medication to help stop the seizures in the ED. Status epilepticus is a medical emergency. Please make sure your medication every day at the same time. This will help prevent seizures in the future. Do not skip any doses or take less than directed, and avoid your seizure triggers. You will be transferred to an epilepsy monitering unit for further management on the seizures.      SECONDARY DISCHARGE DIAGNOSES  Diagnosis: Bipolar disorder  Assessment and Plan of Treatment: Continue psych medications as ordered. Patient is high risk of elopement. Contact CL psychiatry at [facility] on admission and place on 1:1

## 2022-07-07 NOTE — EEG REPORT - NS EEG TEXT BOX
Patient Name: YODIT MORROW  Age and : 37y ([birth year])  MRN #: [id number]    Study Date: 22 11:12-08:00 7-7-22  x20:40hrs    _____________________________________________________________  HISTORY  Patient is a 36y old  Female who presents with a chief complaint of right shoulder abscess (2022 22:22)    PERTINENT MEDICATION:  diVALproex  milliGRAM(s) Oral <User Schedule>  diVALproex ER 1000 milliGRAM(s) Oral <User Schedule>  _____________________________________________________________  STUDY INTERPRETATION    Findings: The background was continuous, spontaneously variable and reactive. Posterior dominant rhythm to 8.5-9Hz 30uV.    Background Slowing:  No generalized background slowing was present.    Focal Slowing:   None were present.    Sleep Background:  Drowsiness was characterized by fragmentation, attenuation, and slowing of the background activity.    Sleep was characterized by the presence of vertex waves, symmetric sleep spindles and K-complexes.    Other Non-Epileptiform Findings:  None were present.    Interictal Epileptiform Activity:   None were present.    Events:  Clinical events: None recorded.  Seizures: None recorded.    Activation Procedures:   Hyperventilation was not performed.    Photic stimulation was performed and did not elicit any abnormality.     Artifacts:  Intermittent myogenic and movement artifacts were noted.    ECG:  The heart rate on single channel ECG was predominantly between  BPM.    _____________________________________________________________  EEG SUMMARY/CLASSIFICATION  Abnormal EEG in the awake, drowsy, and asleep states.  Mild slowing  _____________________________________________________________  EEG IMPRESSION/CLINICAL CORRELATE  Mild diffuse cerebral dysfunction.  No epileptiform pattern or seizure seen.    Denis English MD FAES  Director, Continuous EEG Monitoring Program, [facility]   and Epilepsy Fellowship ,   Department of Neurology, [employer]    [facility] EEG Reading Room Ph#: [phone]  Epilepsy Answering Service after 5PM and before 8:30AM: Ph#: [phone]

## 2022-07-07 NOTE — PHYSICAL THERAPY INITIAL EVALUATION ADULT - PATIENT PROFILE REVIEW, REHAB EVAL
No Formal Activity Order in the Computer; Spoke with ALEYDA Hoskins prior to PT evaluation--> Pt OK for PT consult/OOB activity./yes

## 2022-07-07 NOTE — BH CONSULTATION LIAISON PROGRESS NOTE - NSBHCHARTREVIEWVS_PSY_A_CORE FT
Vital Signs Last 24 Hrs  T(C): 36.9 (07 Jul 2022 06:47), Max: 37.1 (06 Jul 2022 14:40)  T(F): 98.4 (07 Jul 2022 06:47), Max: 98.7 (06 Jul 2022 14:40)  HR: 77 (07 Jul 2022 06:47) (66 - 93)  BP: 100/60 (07 Jul 2022 06:47) (100/60 - 102/67)  BP(mean): --  RR: 16 (07 Jul 2022 06:47) (16 - 17)  SpO2: 99% (07 Jul 2022 06:47) (98% - 100%)

## 2022-07-07 NOTE — BH CONSULTATION LIAISON PROGRESS NOTE - NSBHCONSULTFOLLOWAFTERCARE_PSY_A_CORE FT
Patient can be followed by outpatient psychiatrist   CL hand off given to [facility] due to transfer to EMU

## 2022-07-07 NOTE — DISCHARGE NOTE PROVIDER - NSDCMRMEDTOKEN_GEN_ALL_CORE_FT
Ala-Maximo 1% topical cream: 1 application topically once  albuterol 90 mcg/inh inhalation aerosol: 2 puff(s) inhaled every 6 hours, As needed, Shortness of Breath and/or Wheezing  ARIPiprazole 30 mg oral tablet: 1 tab(s) orally once a day  chlorproMAZINE 50 mg oral tablet: 1 tab(s) orally every 6 hours   diphenhydrAMINE 25 mg oral capsule: 1 cap(s) orally every 6 hours, As needed, Rash and/or Itching  divalproex sodium 500 mg oral tablet, extended release: 1 tab(s) orally once a day  divalproex sodium 500 mg oral tablet, extended release: 2 tab(s) orally once a day (at bedtime)  docusate sodium 100 mg oral capsule: 1 cap(s) orally once a day (at bedtime), As Needed  doxycycline hyclate 100 mg oral capsule: 1 cap(s) orally 2 times a day   Eliquis 5 mg oral tablet: 1 tab(s) orally 2 times a day   fluticasone 50 mcg/inh nasal spray: 1 spray(s) nasal 2 times a day   mg oral tablet: 1 tab(s) orally every 8 hours as needed for pain, take with food  levothyroxine 75 mcg (0.075 mg) oral tablet: 1 tab(s) orally once a day  lurasidone 40 mg oral tablet: 1 tab(s) orally once a day  melatonin 3 mg oral tablet: 1 tab(s) orally once a day (at bedtime)  Multiple Vitamins with Iron oral tablet: 1 tab(s) orally once a day  olopatadine 0.1% ophthalmic solution: 1 drop(s) to each affected eye every 6 hours, As Needed  pantoprazole 40 mg oral delayed release tablet: 1 tab(s) orally once a day (before a meal)  polyethylene glycol 3350 oral powder for reconstitution: 17 gram(s) orally once a day  QUEtiapine 200 mg oral tablet: 1 tab(s) orally 2 times a day  QUEtiapine 50 mg oral tablet: 1 tab(s) orally once a day  tiZANidine 2 mg oral tablet: 2 tab(s) orally 2 times a day, As Needed  Tylenol 325 mg oral tablet: 2 tab(s) orally every 6 hours, As Needed  Vitamin D2 1.25 mg (50,000 intl units) oral capsule: 1 cap(s) orally once a week   albuterol 90 mcg/inh inhalation aerosol: 2 puff(s) inhaled every 6 hours, As needed, Shortness of Breath and/or Wheezing  ARIPiprazole 30 mg oral tablet: 1 tab(s) orally once a day  docusate sodium 100 mg oral capsule: 1 cap(s) orally once a day (at bedtime), As Needed  Eliquis 5 mg oral tablet: 1 tab(s) orally 2 times a day   fluticasone 50 mcg/inh nasal spray: 1 spray(s) nasal 2 times a day  levothyroxine 75 mcg (0.075 mg) oral tablet: 1 tab(s) orally once a day  lurasidone 40 mg oral tablet: 1 tab(s) orally once a day  melatonin 3 mg oral tablet: 1 tab(s) orally once a day (at bedtime)  Multiple Vitamins with Iron oral tablet: 1 tab(s) orally once a day  olopatadine 0.1% ophthalmic solution: 1 drop(s) to each affected eye every 6 hours, As Needed  pantoprazole 40 mg oral delayed release tablet: 1 tab(s) orally once a day (before a meal)  polyethylene glycol 3350 oral powder for reconstitution: 17 gram(s) orally once a day  QUEtiapine 200 mg oral tablet: 1 tab(s) orally once a day (at bedtime)  QUEtiapine 50 mg oral tablet: 1 tab(s) orally once a day  tiZANidine 2 mg oral tablet: 2 tab(s) orally 2 times a day, As Needed  Tylenol 325 mg oral tablet: 2 tab(s) orally every 6 hours, As Needed  valproic acid 250 mg oral capsule: 2 cap(s) orally every 24 hours  valproic acid 250 mg oral capsule: 4 cap(s) orally every 24 hours  Vitamin D2 1.25 mg (50,000 intl units) oral capsule: 1 cap(s) orally once a week   albuterol 90 mcg/inh inhalation aerosol: 2 puff(s) inhaled every 6 hours, As needed, Shortness of Breath and/or Wheezing  ARIPiprazole 30 mg oral tablet: 1 tab(s) orally once a day  docusate sodium 100 mg oral capsule: 1 cap(s) orally once a day (at bedtime), As Needed  Eliquis 5 mg oral tablet: 1 tab(s) orally 2 times a day   fluticasone 50 mcg/inh nasal spray: 1 spray(s) nasal 2 times a day  hydrocortisone 1% topical cream: 1 application topically once  levothyroxine 75 mcg (0.075 mg) oral tablet: 1 tab(s) orally once a day  lurasidone 40 mg oral tablet: 1 tab(s) orally once a day  melatonin 3 mg oral tablet: 1 tab(s) orally once a day (at bedtime)  Multiple Vitamins with Iron oral tablet: 1 tab(s) orally once a day  olopatadine 0.1% ophthalmic solution: 1 drop(s) to each affected eye every 6 hours, As Needed  pantoprazole 40 mg oral delayed release tablet: 1 tab(s) orally once a day (before a meal)  polyethylene glycol 3350 oral powder for reconstitution: 17 gram(s) orally once a day  QUEtiapine 200 mg oral tablet: 1 tab(s) orally once a day (at bedtime)  QUEtiapine 50 mg oral tablet: 1 tab(s) orally once a day  tiZANidine 2 mg oral tablet: 2 tab(s) orally 2 times a day, As Needed  Tylenol 325 mg oral tablet: 2 tab(s) orally every 6 hours, As Needed  valproic acid 250 mg oral capsule: 2 cap(s) orally every 24 hours  valproic acid 250 mg oral capsule: 4 cap(s) orally every 24 hours  Vitamin D2 1.25 mg (50,000 intl units) oral capsule: 1 cap(s) orally once a week   albuterol 90 mcg/inh inhalation aerosol: 2 puff(s) inhaled every 6 hours, As needed, Shortness of Breath and/or Wheezing  ARIPiprazole 30 mg oral tablet: 1 tab(s) orally once a day  docusate sodium 100 mg oral capsule: 1 cap(s) orally once a day (at bedtime), As Needed  Eliquis 5 mg oral tablet: 1 tab(s) orally 2 times a day   fluticasone 50 mcg/inh nasal spray: 1 spray(s) nasal 2 times a day  levothyroxine 75 mcg (0.075 mg) oral tablet: 1 tab(s) orally once a day  lidocaine 4% topical film: Apply topically to affected area once a day  lurasidone 40 mg oral tablet: 1 tab(s) orally once a day  melatonin 3 mg oral tablet: 1 tab(s) orally once a day (at bedtime)  Multiple Vitamins with Iron oral tablet: 1 tab(s) orally once a day  olopatadine 0.1% ophthalmic solution: 1 drop(s) to each affected eye every 6 hours, As Needed  pantoprazole 40 mg oral delayed release tablet: 1 tab(s) orally once a day (before a meal)  polyethylene glycol 3350 oral powder for reconstitution: 17 gram(s) orally once a day  QUEtiapine 200 mg oral tablet: 1 tab(s) orally once a day (at bedtime)  QUEtiapine 50 mg oral tablet: 1 tab(s) orally once a day  tiZANidine 2 mg oral tablet: 2 tab(s) orally 2 times a day, As Needed  Tylenol 325 mg oral tablet: 2 tab(s) orally every 6 hours, As Needed  valproic acid 250 mg oral capsule: 2 cap(s) orally every 24 hours  valproic acid 250 mg oral capsule: 4 cap(s) orally every 24 hours  Vitamin D2 1.25 mg (50,000 intl units) oral capsule: 1 cap(s) orally once a week   albuterol 90 mcg/inh inhalation aerosol: 2 puff(s) inhaled every 6 hours, As needed, Shortness of Breath and/or Wheezing  ARIPiprazole 30 mg oral tablet: 1 tab(s) orally once a day  docusate sodium 100 mg oral capsule: 1 cap(s) orally once a day (at bedtime), As Needed  Eliquis 5 mg oral tablet: 1 tab(s) orally 2 times a day   fluticasone 50 mcg/inh nasal spray: 1 spray(s) nasal 2 times a day  levothyroxine 75 mcg (0.075 mg) oral tablet: 1 tab(s) orally once a day  lidocaine 4% topical film: Apply topically to affected area once a day  lurasidone 40 mg oral tablet: 1 tab(s) orally once a day at noon  melatonin 3 mg oral tablet: 1 tab(s) orally once a day (at bedtime)  Multiple Vitamins with Iron oral tablet: 1 tab(s) orally once a day  olopatadine 0.1% ophthalmic solution: 1 drop(s) to each affected eye every 6 hours, As Needed  pantoprazole 40 mg oral delayed release tablet: 1 tab(s) orally once a day (before a meal)  polyethylene glycol 3350 oral powder for reconstitution: 17 gram(s) orally once a day  QUEtiapine 200 mg oral tablet: 1 tab(s) orally 2 times a day at 8 am and 8pm  QUEtiapine 50 mg oral tablet: 1 tab(s) orally once a day at noon  tiZANidine 2 mg oral tablet: 2 tab(s) orally 2 times a day, As Needed  Tylenol 325 mg oral tablet: 2 tab(s) orally every 6 hours, As Needed  valproic acid 250 mg oral capsule: 2 cap(s) orally every 24 hours  valproic acid 250 mg oral capsule: 4 cap(s) orally every 24 hours  Vitamin D2 1.25 mg (50,000 intl units) oral capsule: 1 cap(s) orally once a week

## 2022-07-07 NOTE — PROGRESS NOTE ADULT - SUBJECTIVE AND OBJECTIVE BOX
Internal Medicine   Zacarias Gonzalez | PGY-3    OVERNIGHT EVENTS: Does endorse worsening back pain, has these chronic issues now, recently admitted to ED for similar issues. Relief with oxycodone. Initial EEG report negative.     SUBJECTIVE: Patient was seen and examined at bedside this morning. Appears less drowsy. Denies any nausea/vomiting/diarrhea, headache, shortness of breath, abdominal pain or chest pain/palpitations. Patient responding appropriately to questions and able to make needs known. Vital signs/imaging/telemetry events reviewed.       MEDICATIONS  (STANDING):  apixaban 5 milliGRAM(s) Oral two times a day  ARIPiprazole 30 milliGRAM(s) Oral daily  levothyroxine 75 MICROGram(s) Oral daily  lurasidone 40 milliGRAM(s) Oral daily  pantoprazole    Tablet 40 milliGRAM(s) Oral before breakfast  QUEtiapine 200 milliGRAM(s) Oral <User Schedule>  QUEtiapine 50 milliGRAM(s) Oral <User Schedule>  senna 2 Tablet(s) Oral at bedtime  valproic acid 500 milliGRAM(s) Oral every 24 hours  valproic acid 1000 milliGRAM(s) Oral every 24 hours    MEDICATIONS  (PRN):  acetaminophen     Tablet .. 650 milliGRAM(s) Oral every 6 hours PRN Temp greater or equal to 38C (100.4F), Mild Pain (1 - 3)  aluminum hydroxide/magnesium hydroxide/simethicone Suspension 30 milliLiter(s) Oral every 4 hours PRN Dyspepsia  LORazepam   Injectable 2 milliGRAM(s) IV Push every 4 hours PRN Agitation  melatonin 3 milliGRAM(s) Oral at bedtime PRN Insomnia        T(F): 98.4 (07-07-22 @ 06:47), Max: 98.7 (07-06-22 @ 14:40)  HR: 77 (07-07-22 @ 06:47) (66 - 93)  BP: 100/60 (07-07-22 @ 06:47) (100/60 - 102/67)  BP(mean): --  RR: 16 (07-07-22 @ 06:47) (16 - 17)  SpO2: 99% (07-07-22 @ 06:47) (98% - 100%)    PHYSICAL EXAM:     GENERAL: NAD, lying in bed comfortably  HEAD:  Atraumatic, Normocephalic  EYES: EOMI, PERRLA, conjunctiva and sclera clear, no nystagmus noted  ENT: Moist mucous membranes,   NECK: Supple, No JVD, trachea midline  CHEST/LUNG: Clear to auscultation bilaterally; No rales, rhonchi, wheezing, or rubs. Unlabored respirations  HEART: Regular rate and rhythm; No murmurs, rubs, or gallops, normal S1/S2  ABDOMEN: normal bowel sounds; Soft, nontender, nondistended, no organomegaly   EXTREMITIES:  2+ Peripheral Pulses, brisk capillary refill. No clubbing, cyanosis, or edema  MSK: No gross deformities noted   Neurological:  A&Ox3, no focal deficits   SKIN: No rashes or lesions  PSYCH: Normal mood, affect     TELEMETRY:    LABS:                        11.7   5.82  )-----------( 179      ( 05 Jul 2022 11:20 )             37.8     07-05    142  |  105  |  13  ----------------------------<  121<H>  4.4   |  23  |  0.83    Ca    9.9      05 Jul 2022 11:20    TPro  8.9<H>  /  Alb  4.7  /  TBili  0.2  /  DBili  x   /  AST  28  /  ALT  32  /  AlkPhos  52  07-05            Creatinine Trend: 0.83<--, 0.89<--, 0.87<--, 0.82<--, 0.77<--, 0.80<--  I&O's Summary    06 Jul 2022 07:01  -  07 Jul 2022 07:00  --------------------------------------------------------  IN: 960 mL / OUT: 800 mL / NET: 160 mL      BNP    RADIOLOGY & ADDITIONAL STUDIES:

## 2022-07-07 NOTE — BH CONSULTATION LIAISON PROGRESS NOTE - NSBHFUPINTERVALHXFT_PSY_A_CORE
Patient was seen and assessed at bedside. patient is alert, oriented and calm on todays exam. Patient being transferred to EMU unit and in agreement. Patient received 1 PRN ativan overnight. Behavior in control this am. Patient denies SI and HI. Patient denies AH and VH as well. Has been compliant with medications. Report provided to CL team at [place].

## 2022-07-07 NOTE — BH CONSULTATION LIAISON PROGRESS NOTE - CURRENT MEDICATION
MEDICATIONS  (STANDING):  apixaban 5 milliGRAM(s) Oral two times a day  ARIPiprazole 30 milliGRAM(s) Oral daily  levothyroxine 75 MICROGram(s) Oral daily  lidocaine   4% Patch 1 Patch Transdermal daily  lurasidone 40 milliGRAM(s) Oral daily  QUEtiapine 200 milliGRAM(s) Oral <User Schedule>  QUEtiapine 50 milliGRAM(s) Oral <User Schedule>  senna 2 Tablet(s) Oral at bedtime  valproic acid 500 milliGRAM(s) Oral every 24 hours  valproic acid 1000 milliGRAM(s) Oral every 24 hours    MEDICATIONS  (PRN):  acetaminophen     Tablet .. 650 milliGRAM(s) Oral every 6 hours PRN Temp greater or equal to 38C (100.4F), Mild Pain (1 - 3)  aluminum hydroxide/magnesium hydroxide/simethicone Suspension 30 milliLiter(s) Oral every 4 hours PRN Dyspepsia  LORazepam   Injectable 2 milliGRAM(s) IV Push every 4 hours PRN Agitation  melatonin 3 milliGRAM(s) Oral at bedtime PRN Insomnia

## 2022-07-07 NOTE — PROVIDER CONTACT NOTE (OTHER) - ACTION/TREATMENT ORDERED:
MD Juan Floyd stated ok to reschedule 8pm meds, attempt to give medications when pt is more calm
Will administer IV medications as per provider.

## 2022-07-07 NOTE — DISCHARGE NOTE PROVIDER - CARE PROVIDER_API CALL
Jose G Byers)  Neurology  [address], [address]  [place] [address]  Phone: [phone]  Fax: [phone]  Established Patient  Follow Up Time: 2 weeks

## 2022-07-07 NOTE — PHYSICAL THERAPY INITIAL EVALUATION ADULT - GENERAL OBSERVATIONS, REHAB EVAL
Pt encountered in semisupine position, no distress, AxOx3, with +IV. Pt agreeable to participate in PT evaluation.

## 2022-07-07 NOTE — PHYSICAL THERAPY INITIAL EVALUATION ADULT - ADDITIONAL COMMENTS
Pt reports that she lives in a group home; no steps to negotiate elevator inside. Prior to hospital admission pt was completely independent and used no assistive device with ambulation. Pt denies any recent falls and states that she does get double vision/blurry vision @ times. (currently denies vision issues @ this time).    Pt left comfortable in bed, HOB elevated, NAD, all lines intact, all precautions maintained, with call bell in reach, bed alarm on, and RN aware of PT evaluation.
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
## 2022-07-07 NOTE — DISCHARGE NOTE PROVIDER - HOSPITAL COURSE
38 y/o F PMHx obesity, factor V Leiden deficiency a/w DVT/PE (apixaban 5mg BID), seizures a/w pseudoseizures (VPA ER 500mg QD, 1000mg qHS), bipolar disorder, endometriosis, hypothyroid seen at ED for tonic clonic seizure like activity meeting criteria for status epilepticus, given 8MG lorazepam and levetiracetam . MRI was performed and neg for cord compression. CT head revealed no acute intracranial hemorrhage, brain edema or mass effect. Neuro was consulted. 24-hour video EEG ordered and seizure, fall precautions in place. Behavioral psych was consulted for medication management, placed on 1:1  Pt will be transferred to EMU. HPI:  37 year old female w/ PMHx obesity, factor V Leiden deficiency a/w DVT/PE (apixaban 5mg BID), seizures a/w pseudoseizures (VPA ER 500mg QD, 1000mg qHS), bipolar disorder, endometriosis, hypothyroid, recently discharged from ED on 7/1/22 after a visit for shoulder abscess and had drainage by IR, who then presented again to ED 7/4/22 w/ complaint of lower back pain worsening over the prior day w/ MRI consistent w/ chronic degenerative disease during without evidence of cord compression.     Patient was discharged and seated on a stretcher awaiting a ride when she endorsed dizziness to staff, states that she felt like she was going to have a seizure, and was noted to start having "tonic-clonic seizure-like activity with truncal spasms, pupils 4-5 mm non-reactive, involuntary nystagmus and lack of pupillary reflex / dolls eyes," per ED examination. Patient reportedly without responsiveness during this event, and noted to be tachypneic to the 40s w/ shallow respirations. Per RN reassessment note, patient was placed on NRB and cardiac monitor w/ HR in low 100s, sating at 100% on RA. Patient moved into a room and given lorazepam 2mg IM. Patient reportedly still seizing after a few minutes, so was given second dose of lorazepam 2mg IM. She was minimally responsive, but seized again, and another 2mg lorazepam was given. Patient started have tonic clonic movements again, and another lorazepam 2mg IV was given. Of note, patient was given levetiracetam 3,000mg load in the ED as well. MRI findings negative for cord compression. CT head No acute intracranial hemorrhage, brain edema, or mass effect.No displaced calvarial fracture.    MRI was performed and neg for cord compression. CT head revealed no acute intracranial hemorrhage, brain edema or mass effect. Neuro was consulted. 24-hour video EEG ordered and seizure, fall precautions in place. Behavioral psych was consulted for medication management, placed on 1:1  Pt will be transferred to EMU. HPI:  37 year old female w/ PMHx obesity, factor V Leiden deficiency a/w DVT/PE (apixaban 5mg BID), seizures a/w pseudoseizures (VPA ER 500mg QD, 1000mg qHS), bipolar disorder, endometriosis, hypothyroid, recently discharged from ED on 7/1/22 after a visit for shoulder abscess and had drainage by IR, who then presented again to ED 7/4/22 w/ complaint of lower back pain worsening over the prior day w/ MRI consistent w/ chronic degenerative disease during without evidence of cord compression.     Patient was discharged and seated on a stretcher awaiting a ride when she endorsed dizziness to staff, states that she felt like she was going to have a seizure, and was noted to start having "tonic-clonic seizure-like activity with truncal spasms, pupils 4-5 mm non-reactive, involuntary nystagmus and lack of pupillary reflex / dolls eyes," per ED examination. Patient reportedly without responsiveness during this event, and noted to be tachypneic to the 40s w/ shallow respirations. Per RN reassessment note, patient was placed on NRB and cardiac monitor w/ HR in low 100s, sating at 100% on RA. Patient moved into a room and given lorazepam 2mg IM. Patient reportedly still seizing after a few minutes, so was given second dose of lorazepam 2mg IM. She was minimally responsive, but seized again, and another 2mg lorazepam was given. Patient started have tonic clonic movements again, and another lorazepam 2mg IV was given. Of note, patient was given levetiracetam 3,000mg load in the ED as well. MRI findings negative for cord compression. CT head No acute intracranial hemorrhage, brain edema, or mass effect.No displaced calvarial fracture.    MRI spine was performed and neg for cord compression. CT head revealed no acute intracranial hemorrhage, brain edema or mass effect. Neuro was consulted. 24-hour video EEG ordered and seizure, fall precautions in place. EEG summary: Abnormal EEG in the awake, drowsy, and asleep states. Mild slowing. EEG IMPRESSION/CLINICAL CORRELATE: Mild diffuse cerebral dysfunction. No epileptiform pattern or seizure seen.    Behavioral psych was consulted for medication management, was continued on home psych medications placed on 1:1  Pt will be transferred to EMU. HPI:  37 year old female w/ PMHx obesity, factor V Leiden deficiency a/w DVT/PE (apixaban 5mg BID), seizures a/w pseudoseizures (VPA ER 500mg QD, 1000mg qHS), bipolar disorder, endometriosis, hypothyroid, recently discharged from ED on 7/1/22 after a visit for shoulder abscess and had drainage by IR, who then presented again to ED 7/4/22 w/ complaint of lower back pain worsening over the prior day w/ MRI consistent w/ chronic degenerative disease during without evidence of cord compression.     Patient was discharged and seated on a stretcher awaiting a ride when she endorsed dizziness to staff, states that she felt like she was going to have a seizure, and was noted to start having "tonic-clonic seizure-like activity with truncal spasms, pupils 4-5 mm non-reactive, involuntary nystagmus and lack of pupillary reflex / dolls eyes," per ED examination. Patient reportedly without responsiveness during this event, and noted to be tachypneic to the 40s w/ shallow respirations. Per RN reassessment note, patient was placed on NRB and cardiac monitor w/ HR in low 100s, sating at 100% on RA. Patient moved into a room and given lorazepam 2mg IM. Patient reportedly still seizing after a few minutes, so was given second dose of lorazepam 2mg IM. She was minimally responsive, but seized again, and another 2mg lorazepam was given. Patient started have tonic clonic movements again, and another lorazepam 2mg IV was given. Of note, patient was given levetiracetam 3,000mg load in the ED as well. MRI findings negative for cord compression. CT head No acute intracranial hemorrhage, brain edema, or mass effect.No displaced calvarial fracture.    Pt was admitted for status epilepticus. MRI spine was performed and neg for cord compression. CT head revealed no acute intracranial hemorrhage, brain edema or mass effect. Neuro was consulted, 24-hour video EEG ordered and seizure, fall precautions in place. EEG summary for 6/15/22 and 5/23/22 EEGs: Abnormal EEG in the awake, drowsy, and asleep states. Mild slowing. EEG IMPRESSION/CLINICAL CORRELATE: Mild diffuse cerebral dysfunction. No epileptiform pattern or seizure seen.    Behavioral psych was consulted for medication management, was continued on home psych medications placed on 1:1  Pt will be transferred to EMU. HPI:  37 year old female w/ PMHx obesity, factor V Leiden deficiency a/w DVT/PE (apixaban 5mg BID), seizures a/w pseudoseizures (VPA ER 500mg QD, 1000mg qHS), bipolar disorder, endometriosis, hypothyroid, recently discharged from ED on 7/1/22 after a visit for shoulder abscess and had drainage by IR, who then presented again to ED 7/4/22 w/ complaint of lower back pain worsening over the prior day w/ MRI consistent w/ chronic degenerative disease during without evidence of cord compression.     Patient was discharged and seated on a stretcher awaiting a ride when she endorsed dizziness to staff, states that she felt like she was going to have a seizure, and was noted to start having "tonic-clonic seizure-like activity with truncal spasms, pupils 4-5 mm non-reactive, involuntary nystagmus and lack of pupillary reflex / dolls eyes," per ED examination. Patient reportedly without responsiveness during this event, and noted to be tachypneic to the 40s w/ shallow respirations. Per RN reassessment note, patient was placed on NRB and cardiac monitor w/ HR in low 100s, sating at 100% on RA. Patient moved into a room and given lorazepam 2mg IM. Patient reportedly still seizing after a few minutes, so was given second dose of lorazepam 2mg IM. She was minimally responsive, but seized again, and another 2mg lorazepam was given. Patient started have tonic clonic movements again, and another lorazepam 2mg IV was given. Of note, patient was given levetiracetam 3,000mg load in the ED as well. MRI findings negative for cord compression. CT head No acute intracranial hemorrhage, brain edema, or mass effect.No displaced calvarial fracture.    Pt was admitted for status epilepticus. MRI spine was performed and neg for cord compression. CT head revealed no acute intracranial hemorrhage, brain edema or mass effect. Neuro was consulted, 24-hour video EEG ordered and seizure, fall precautions in place. EEG summary for 6/15/22 and 5/23/22 EEGs: Abnormal EEG in the awake, drowsy, and asleep states. Mild slowing. EEG IMPRESSION/CLINICAL CORRELATE: Mild diffuse cerebral dysfunction. No epileptiform pattern or seizure seen.    Behavioral psych was consulted for medication management, was continued on home psych medications placed on 1:1. Pt will be transferred to EMU.

## 2022-07-07 NOTE — PHYSICAL THERAPY INITIAL EVALUATION ADULT - WEIGHT-BEARING RESTRICTIONS: GAIT, REHAB EVAL
Health Maintenance Due   Topic Date Due   • Pneumococcal Vaccine 0-64 (1 of 1 - PPSV23) 07/17/1961   • Shingles Vaccine (1 of 2) 07/17/2007   • Hepatitis C Screening  07/17/2008       Patient is due for topics as listed above but is not proceeding with Immunization(s) Pneumococcal and Shingles at this time. Pt declined shingles and pneumonia vaccine. She will discuss hep c screening with  Sulma Almaraz.          weight-bearing as tolerated

## 2022-07-07 NOTE — SBIRT NOTE ADULT - NSSBIRTUNABLESCROTHER_GEN_A_CORE
Pt lives in a group home setting where alcohol is not provided or permitted.   Pt reports she does not drink alcohol.

## 2022-07-07 NOTE — DISCHARGE NOTE PROVIDER - NSDCFUSCHEDAPPT_GEN_ALL_CORE_FT
Raoul Weller  [facility]  CARDIOLOGY [address]  Scheduled Appointment: 07/11/2022    [address]  Scheduled Appointment: 07/22/2022    Omar Hernandez  [address]  Scheduled Appointment: 07/22/2022
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
## 2022-07-08 ENCOUNTER — INPATIENT (INPATIENT)
Facility: HOSPITAL | Age: 37
LOS: 2 days | Discharge: NURSING, REHAB OR LONG-TERM CARE | DRG: 101 | End: 2022-07-11
Attending: PSYCHIATRY & NEUROLOGY | Admitting: PSYCHIATRY & NEUROLOGY
Payer: COMMERCIAL

## 2022-07-08 ENCOUNTER — TRANSCRIPTION ENCOUNTER (OUTPATIENT)
Age: 37
End: 2022-07-08

## 2022-07-08 VITALS
TEMPERATURE: 98 F | DIASTOLIC BLOOD PRESSURE: 74 MMHG | HEART RATE: 94 BPM | SYSTOLIC BLOOD PRESSURE: 106 MMHG | OXYGEN SATURATION: 97 % | RESPIRATION RATE: 18 BRPM

## 2022-07-08 VITALS
SYSTOLIC BLOOD PRESSURE: 111 MMHG | TEMPERATURE: 99 F | DIASTOLIC BLOOD PRESSURE: 78 MMHG | RESPIRATION RATE: 17 BRPM | OXYGEN SATURATION: 98 % | HEART RATE: 94 BPM

## 2022-07-08 DIAGNOSIS — R56.9 UNSPECIFIED CONVULSIONS: ICD-10-CM

## 2022-07-08 DIAGNOSIS — D68.51 ACTIVATED PROTEIN C RESISTANCE: ICD-10-CM

## 2022-07-08 PROCEDURE — 95720 EEG PHY/QHP EA INCR W/VEEG: CPT

## 2022-07-08 PROCEDURE — 99239 HOSP IP/OBS DSCHRG MGMT >30: CPT | Mod: GC

## 2022-07-08 RX ORDER — QUETIAPINE FUMARATE 200 MG/1
50 TABLET, FILM COATED ORAL
Refills: 0 | Status: DISCONTINUED | OUTPATIENT
Start: 2022-07-08 | End: 2022-07-11

## 2022-07-08 RX ORDER — HALOPERIDOL DECANOATE 100 MG/ML
2 INJECTION INTRAMUSCULAR EVERY 6 HOURS
Refills: 0 | Status: DISCONTINUED | OUTPATIENT
Start: 2022-07-08 | End: 2022-07-11

## 2022-07-08 RX ORDER — LEVOTHYROXINE SODIUM 125 MCG
75 TABLET ORAL DAILY
Refills: 0 | Status: DISCONTINUED | OUTPATIENT
Start: 2022-07-08 | End: 2022-07-08

## 2022-07-08 RX ORDER — LIDOCAINE 4 G/100G
0 CREAM TOPICAL
Qty: 0 | Refills: 0 | DISCHARGE
Start: 2022-07-08

## 2022-07-08 RX ORDER — ARIPIPRAZOLE 15 MG/1
30 TABLET ORAL DAILY
Refills: 0 | Status: DISCONTINUED | OUTPATIENT
Start: 2022-07-08 | End: 2022-07-11

## 2022-07-08 RX ORDER — PANTOPRAZOLE SODIUM 20 MG/1
40 TABLET, DELAYED RELEASE ORAL
Refills: 0 | Status: DISCONTINUED | OUTPATIENT
Start: 2022-07-08 | End: 2022-07-11

## 2022-07-08 RX ORDER — OLANZAPINE 15 MG/1
2.5 TABLET, FILM COATED ORAL ONCE
Refills: 0 | Status: COMPLETED | OUTPATIENT
Start: 2022-07-08 | End: 2022-07-10

## 2022-07-08 RX ORDER — LEVOTHYROXINE SODIUM 125 MCG
75 TABLET ORAL DAILY
Refills: 0 | Status: DISCONTINUED | OUTPATIENT
Start: 2022-07-08 | End: 2022-07-11

## 2022-07-08 RX ORDER — SENNA PLUS 8.6 MG/1
2 TABLET ORAL AT BEDTIME
Refills: 0 | Status: DISCONTINUED | OUTPATIENT
Start: 2022-07-08 | End: 2022-07-11

## 2022-07-08 RX ORDER — OXYCODONE HYDROCHLORIDE 5 MG/1
5 TABLET ORAL ONCE
Refills: 0 | Status: DISCONTINUED | OUTPATIENT
Start: 2022-07-08 | End: 2022-07-08

## 2022-07-08 RX ORDER — HYDROCORTISONE 1 %
1 OINTMENT (GRAM) TOPICAL
Qty: 0 | Refills: 0 | DISCHARGE
Start: 2022-07-08

## 2022-07-08 RX ORDER — ENOXAPARIN SODIUM 100 MG/ML
40 INJECTION SUBCUTANEOUS EVERY 24 HOURS
Refills: 0 | Status: DISCONTINUED | OUTPATIENT
Start: 2022-07-08 | End: 2022-07-08

## 2022-07-08 RX ORDER — LURASIDONE HYDROCHLORIDE 40 MG/1
40 TABLET ORAL DAILY
Refills: 0 | Status: DISCONTINUED | OUTPATIENT
Start: 2022-07-08 | End: 2022-07-11

## 2022-07-08 RX ORDER — APIXABAN 2.5 MG/1
5 TABLET, FILM COATED ORAL
Refills: 0 | Status: DISCONTINUED | OUTPATIENT
Start: 2022-07-08 | End: 2022-07-11

## 2022-07-08 RX ORDER — VALPROIC ACID (AS SODIUM SALT) 250 MG/5ML
1000 SOLUTION, ORAL ORAL ONCE
Refills: 0 | Status: DISCONTINUED | OUTPATIENT
Start: 2022-07-08 | End: 2022-07-10

## 2022-07-08 RX ORDER — FLUTICASONE PROPIONATE 50 MCG
1 SPRAY, SUSPENSION NASAL
Refills: 0 | Status: DISCONTINUED | OUTPATIENT
Start: 2022-07-08 | End: 2022-07-11

## 2022-07-08 RX ORDER — PANTOPRAZOLE SODIUM 20 MG/1
40 TABLET, DELAYED RELEASE ORAL
Refills: 0 | Status: DISCONTINUED | OUTPATIENT
Start: 2022-07-08 | End: 2022-07-08

## 2022-07-08 RX ORDER — ALBUTEROL 90 UG/1
2 AEROSOL, METERED ORAL EVERY 6 HOURS
Refills: 0 | Status: DISCONTINUED | OUTPATIENT
Start: 2022-07-08 | End: 2022-07-11

## 2022-07-08 RX ORDER — POLYETHYLENE GLYCOL 3350 17 G/17G
17 POWDER, FOR SOLUTION ORAL DAILY
Refills: 0 | Status: DISCONTINUED | OUTPATIENT
Start: 2022-07-08 | End: 2022-07-11

## 2022-07-08 RX ORDER — VALPROIC ACID (AS SODIUM SALT) 250 MG/5ML
1000 SOLUTION, ORAL ORAL DAILY
Refills: 0 | Status: DISCONTINUED | OUTPATIENT
Start: 2022-07-08 | End: 2022-07-09

## 2022-07-08 RX ORDER — QUETIAPINE FUMARATE 200 MG/1
200 TABLET, FILM COATED ORAL
Refills: 0 | Status: DISCONTINUED | OUTPATIENT
Start: 2022-07-08 | End: 2022-07-11

## 2022-07-08 RX ORDER — ACETAMINOPHEN 500 MG
650 TABLET ORAL EVERY 6 HOURS
Refills: 0 | Status: DISCONTINUED | OUTPATIENT
Start: 2022-07-08 | End: 2022-07-11

## 2022-07-08 RX ADMIN — Medication 650 MILLIGRAM(S): at 16:58

## 2022-07-08 RX ADMIN — QUETIAPINE FUMARATE 200 MILLIGRAM(S): 200 TABLET, FILM COATED ORAL at 21:30

## 2022-07-08 RX ADMIN — APIXABAN 5 MILLIGRAM(S): 2.5 TABLET, FILM COATED ORAL at 17:01

## 2022-07-08 RX ADMIN — Medication 650 MILLIGRAM(S): at 17:30

## 2022-07-08 RX ADMIN — Medication 500 MILLIGRAM(S): at 09:14

## 2022-07-08 RX ADMIN — OXYCODONE HYDROCHLORIDE 5 MILLIGRAM(S): 5 TABLET ORAL at 02:40

## 2022-07-08 RX ADMIN — Medication 50 MILLIGRAM(S): at 17:10

## 2022-07-08 RX ADMIN — LURASIDONE HYDROCHLORIDE 40 MILLIGRAM(S): 40 TABLET ORAL at 13:17

## 2022-07-08 RX ADMIN — Medication 1 SPRAY(S): at 17:01

## 2022-07-08 RX ADMIN — Medication 75 MICROGRAM(S): at 05:57

## 2022-07-08 RX ADMIN — LIDOCAINE 1 PATCH: 4 CREAM TOPICAL at 00:00

## 2022-07-08 RX ADMIN — OXYCODONE HYDROCHLORIDE 5 MILLIGRAM(S): 5 TABLET ORAL at 02:02

## 2022-07-08 RX ADMIN — APIXABAN 5 MILLIGRAM(S): 2.5 TABLET, FILM COATED ORAL at 05:56

## 2022-07-08 RX ADMIN — ARIPIPRAZOLE 30 MILLIGRAM(S): 15 TABLET ORAL at 13:17

## 2022-07-08 RX ADMIN — QUETIAPINE FUMARATE 50 MILLIGRAM(S): 200 TABLET, FILM COATED ORAL at 13:16

## 2022-07-08 RX ADMIN — QUETIAPINE FUMARATE 200 MILLIGRAM(S): 200 TABLET, FILM COATED ORAL at 09:14

## 2022-07-08 NOTE — PROGRESS NOTE ADULT - PROBLEM SELECTOR PLAN 5
DVT: home eliquis  Diet: regular diet  Dispo: transfer to [facility]    Case antony Carol: [phone]    Legal guardianship :   of Case Management Maria Elena Armijo: [phone]   Cliff Raza ([phone]), DVT: home eliquis  Diet: regular diet  Pain: lidocaine patches + warm compresses for neck pain  Dispo: transfer to [facility] today    Case manger Carol: [phone]    Legal guardianship :   of Case Management Maria Elena Armijo: [phone]   Cliff Carlton Raza ([phone]),

## 2022-07-08 NOTE — DISCHARGE NOTE NURSING/CASE MANAGEMENT/SOCIAL WORK - NSFLUVACAGEDISCH_IMM_ALL_CORE
[FreeTextEntry3] : I agree with the advanced clinical provider's history, physical examination and plan of care. I personally elicited a history and examined the patient. See above attestation. 35 minutes time spent for patient education related to comorbidities and medications, medical records/labs/radiology reviews, preventative care, documentation.\par \par \par  [Time Spent: ___ minutes] : I have spent [unfilled] minutes of time on the encounter. Adult
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
## 2022-07-08 NOTE — PROGRESS NOTE ADULT - PROBLEM SELECTOR PLAN 3
Hx of RLE DVT, on Eliquis 5mg BID outpatient   - continue home eliquis

## 2022-07-08 NOTE — H&P ADULT - NSHPPHYSICALEXAM_GEN_ALL_CORE
Vital Signs Last 24 Hrs  T(C): 36.5 (08 Jul 2022 13:58), Max: 37 (08 Jul 2022 05:45)  T(F): 97.7 (08 Jul 2022 13:58), Max: 98.6 (08 Jul 2022 05:45)  HR: 94 (08 Jul 2022 13:58) (66 - 99)  BP: 106/74 (08 Jul 2022 13:58) (102/73 - 119/83)  BP(mean): --  RR: 18 (08 Jul 2022 13:58) (17 - 18)  SpO2: 97% (08 Jul 2022 13:58) (97% - 100%)    Parameters below as of 08 Jul 2022 13:58  Patient On (Oxygen Delivery Method): room air    Constitutional: awake and alert. NAD  HEENT: PERRLA, EOMI  Neck: Supple  Gastrointestinal: soft, nontender  Extremities: no edema, no cyanosis  Musculoskeletal: no joint swelling/tenderness, no abnormal movements  Skin: no rashes    NEUROLOGICAL EXAM:  MS: AAOX3, fluent, attends b/l; normal attention, language   CN: VFF, EOMI, PERRL, V1-3 intact, no facial asymmetry, SCM/trap intact.  Motor: Strength: 5/5 4x. Tone: normal. Bulk: normal. DTR 2+ symm.  Plantar flex b/l. Sensation: intact to LT

## 2022-07-08 NOTE — H&P ADULT - ASSESSMENT
Patient is a right handed 37 year old female w/ PMHx obesity, factor V Leiden deficiency a/w DVT/PE (apixaban 5mg BID), seizures a/w ?pseudoseizures, bipolar disorder, endometriosis, hypothyroid, recently discharged on 7/2/22 after a visit for shoulder abscess and had drainage by IR, who then presented again to [facility] ED7/4/22 w/ complaint of lower back pain worsening over the prior day w/ MRI consistent w/ chronic degenerative disease during without acutely actionable findings. Patient was discharged and seated on a stretcher awaiting a ride when she endorsed dizziness to staff, started to have a witnessed seizure-like activity. Per chart review patient received lorazepam 2mg IM a total of 4 times & 3,000mg of levetiracetam in the ED for refractory seizures Patient transferred to [facility] EMU for event capture.       Impression  refractory seizure vs PNES in the setting of extensive history in EMU for event capture and possible AED management    Recommendations  VEEG  Depakote 1g IV daily  rescue medications: zyprexa 2.5mg PRN, depakote 1g (second line)  Neuro checks Q4h  continue home medications  seizure precautions  psychiatry following, recs appreciated    Pulm  on RA    Cardio  normotensive  c/w telemetry    GI  regular diet   bowel regiment    Heme  monitor daily cbc  DVT ppx lovenox 40mg daily    Renal  monitor CMP  replete electrolytes as needed  monitor I&Os    Endocrine  continue to monitor     ID  no acute issues, continue to monitor vitals      Case discussed with Dr. Miller   Patient is a right handed 37 year old female w/ PMHx obesity, factor V Leiden deficiency a/w DVT/PE (apixaban 5mg BID), seizures a/w ?pseudoseizures, bipolar disorder, endometriosis, hypothyroid, recently discharged on 7/2/22 after a visit for shoulder abscess and had drainage by IR, who then presented again to [facility] ED7/4/22 w/ complaint of lower back pain worsening over the prior day w/ MRI consistent w/ chronic degenerative disease during without acutely actionable findings. Patient was discharged and seated on a stretcher awaiting a ride when she endorsed dizziness to staff, started to have a witnessed seizure-like activity. Per chart review patient received lorazepam 2mg IM a total of 4 times & 3,000mg of levetiracetam in the ED for refractory seizures Patient transferred to [facility] EMU for event capture.       Impression  refractory seizure vs PNES in the setting of extensive history in EMU for event capture and possible AED management    Recommendations  VEEG  Depakote 1g IV daily  rescue medications: zyprexa 2.5mg PRN, depakote 1g (second line)  Neuro checks Q4h  continue home medications  seizure precautions  psychiatry following, recs appreciated    Pulm  on RA    Cardio  normotensive  c/w telemetry    GI  regular diet   bowel regiment    Heme  monitor daily cbc  DVT ppx continue with home eliquis    Renal  monitor CMP  replete electrolytes as needed  monitor I&Os    Endocrine  c/w home synthroid 75 mcg    ID  no acute issues, continue to monitor vitals    Psych  continue with home psychiatric meds, psych at [facility] following.      Case discussed with Dr. Miller   Patient is a right handed 37 year old female w/ PMHx obesity, factor V Leiden deficiency a/w DVT/PE (apixaban 5mg BID), seizures a/w ?pseudoseizures, bipolar disorder, endometriosis, hypothyroid, recently discharged on 7/2/22 after a visit for shoulder abscess and had drainage by IR, who then presented again to [facility] ED7/4/22 w/ complaint of lower back pain worsening over the prior day w/ MRI consistent w/ chronic degenerative disease during without acutely actionable findings. Patient was discharged and seated on a stretcher awaiting a ride when she endorsed dizziness to staff, started to have a witnessed seizure-like activity. Per chart review patient received lorazepam 2mg IM a total of 4 times & 3,000mg of levetiracetam in the ED for refractory seizures Patient transferred to [facility] EMU for event capture.       Impression  refractory seizure vs PNES in the setting of extensive history in EMU for event capture and possible AED management    Recommendations  VEEG  Depakote 1g IV daily  rescue medications: zyprexa 2.5mg PRN, depakote 1g (second line)  Neuro checks Q4h  continue home medications  seizure precautions  psychiatry following, recs appreciated    Pulm  on RA    Cardio  normotensive  c/w telemetry    GI  regular diet   bowel regiment    Heme  monitor daily cbc  DVT ppx continue with home eliquis    Renal  monitor CMP  replete electrolytes as needed  monitor I&Os    Endocrine  c/w home synthroid 75 mcg    ID  no acute issues, continue to monitor vitals    Psych  continue with home psychiatric meds, psych at [facility] following.  1:1 observation      Case discussed with Dr. Miller   Patient is a right handed 37 year old female w/ PMHx obesity, factor V Leiden deficiency a/w DVT/PE (apixaban 5mg BID), seizures a/w ?pseudoseizures, bipolar disorder, endometriosis, hypothyroid, recently discharged on 7/2/22 after a visit for shoulder abscess and had drainage by IR, who then presented again to [facility] ED7/4/22 w/ complaint of lower back pain worsening over the prior day w/ MRI consistent w/ chronic degenerative disease during without acutely actionable findings. Patient was discharged and seated on a stretcher awaiting a ride when she endorsed dizziness to staff, started to have a witnessed seizure-like activity. Per chart review patient received lorazepam 2mg IM a total of 4 times & 3,000mg of levetiracetam in the ED for refractory seizures Patient transferred to [facility] EMU for event capture.       Impression  refractory seizure vs PNES in the setting of extensive history in EMU for event capture and possible AED management    Recommendations  VEEG  Depakote 1g IV daily  rescue medications: zyprexa 2.5mg PRN, depakote 1g (second line), haldol 2mg IM PRN for agitation  Neuro checks Q4h  continue home medications  seizure precautions  psychiatry following, recs appreciated    Pulm  on RA    Cardio  normotensive  c/w telemetry    GI  regular diet   bowel regiment    Heme  monitor daily cbc  DVT ppx continue with home eliquis    Renal  monitor CMP  replete electrolytes as needed  monitor I&Os    Endocrine  c/w home synthroid 75 mcg    ID  no acute issues, continue to monitor vitals    Psych  continue with home psychiatric meds, psych at [facility] following.  1:1 observation      Case discussed with Dr. Miller

## 2022-07-08 NOTE — PATIENT PROFILE ADULT - NSTRANSFERBELONGINGSRESP_GEN_A_NUR

## 2022-07-08 NOTE — DISCHARGE NOTE NURSING/CASE MANAGEMENT/SOCIAL WORK - PATIENT PORTAL LINK FT
You can access the FollowMyHealth Patient Portal offered by [facility] by registering at the following website: [email or web address]. By joining [facility]’s FollowMyHealth portal, you will also be able to view your health information using other applications (apps) compatible with our system.

## 2022-07-08 NOTE — H&P ADULT - HISTORY OF PRESENT ILLNESS
MRN-[id number]  Patient is a 37y old  Female who presents with a chief complaint of   HPI:  Patient is a right handed 37 year old female w/ PMHx obesity, factor V Leiden deficiency a/w DVT/PE (apixaban 5mg BID), seizures a/w ?pseudoseizures, bipolar disorder, endometriosis, hypothyroid, recently discharged on 7/2/22 after a visit for shoulder abscess and had drainage by IR, who then presented again to [facility] ED7/4/22 w/ complaint of lower back pain worsening over the prior day w/ MRI consistent w/ chronic degenerative disease during without acutely actionable findings. Patient was discharged and seated on a stretcher awaiting a ride when she endorsed dizziness to staff, states that she felt like she was going to have a seizure, and was noted to start having "tonic-clonic seizure-like activity with truncal spasms, pupils 4-5 mm non-reactive, involuntary nystagmus and lack of pupillary reflex / dolls eyes," per ED examination. Patient reportedly without responsiveness during this event, and noted to be tachypneic to the 40s w/ shallow respirations. Per chart review patient was given lorazepam 2mg IM a total of 4 times in the ED or refractory seizures. Patient was also loaded with 3,000mg of levetiracetam.    Per chart review, patient seen by Neurologist Dr. Sanches in 6/15/22 for abnormal movements. At that time, that particular episode was consistent with her pseudoseizures. Patient follows with Dr. Byers in outpatient setting. Per outpatient note, patient has "?aura vs anxiety characterized as abdominal fluttering." Patient was on levetiracetam and switched to VPA at [place] due to concern for abdominal discomfort. Unclear whether there was any complaint of mood change (i.e., irritability) on levetiracetam.     Patient was followed by [facility] neurology and during hospital stay cases was discussed with Dr. Miller. Patient transferred to [facility] EMU for further management.

## 2022-07-08 NOTE — PROGRESS NOTE ADULT - PROBLEM SELECTOR PLAN 4
Continue home synthroid 75mcg QD.

## 2022-07-08 NOTE — H&P ADULT - NSHPREVIEWOFSYSTEMS_GEN_ALL_CORE
General:	awake and alert, patient reports she's tired  Respiratory and Thorax: denies SOB, cough  Cardiovascular: denies palpitations, chest pain  Gastrointestinal: denies nausea, vomiting  Musculoskeletal: denies myalgias, arthralgias  Neurological: denies vision changes, endorses minor HA

## 2022-07-08 NOTE — PROGRESS NOTE ADULT - PROBLEM SELECTOR PLAN 1
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 Seizure-like activity in 6/2022 consistent with pseudoseizure on neuro eval. Unclear if current presentation is true seizures or pseudoseizures. CT head negative for acute hemorrhage/stroke/mass. UTox positive for opioids. Neurology consulted, appreciate recs.  - s/p 8 mg Ativan (6 mg IM and 2mg IV) in ED for convulsions  - s/p 2 mg Ativan 7/7 for agitation in setting of attempted elopement  - will continue home dose of  mg qAM / 1000 mg qHS, VPA level 7/5 50.5  - hold off on further Keppra  - lorazepam 2mg IV PRN for seizure activity lasting >3-5mins, >2x/hr, >3x/day, or if GTC, repeat after 1 minute (max dose 0.1 mg/kg)  - 24-hour video EEG ordered  - seizure/fall/aspiration precautions History of seizures / pseudoseizures in the past now presenting with generalized tonic-clonic seizures meeting criteria for status epilepticus. Seizure-like activity in 6/2022 consistent with pseudoseizure on neuro eval. Unclear if current presentation is true seizures or pseudoseizures. CT head negative for acute hemorrhage/stroke/mass. UTox positive for opioids. Neurology consulted, appreciate recs.  - s/p 8 mg Ativan (6 mg IM and 2mg IV) in ED for convulsions  - s/p 2 mg Ativan 7/7 for agitation in setting of attempted elopement  - will continue home dose of  mg qAM / 1000 mg qHS, VPA level 7/5 50.5  - hold off on further Keppra  - lorazepam 2mg IV PRN for seizure activity lasting >3-5mins, >2x/hr, >3x/day, or if GTC, repeat after 1 minute (max dose 0.1 mg/kg)  - 24-hour video EEG ordered  - seizure/fall/aspiration precautions  - transfer to EMU today for epilepsy monitoring  - pupil asymmetry noted 7/8 immediately prior to EMU transfer, discussed findings with Neuro resident Avelino Serrano, Neuro will evaluate

## 2022-07-08 NOTE — DISCHARGE NOTE NURSING/CASE MANAGEMENT/SOCIAL WORK - NSDCPEFALRISK_GEN_ALL_CORE
For information on Fall & Injury Prevention, visit: [email or web address] OR  [email or web address] OR  https://www.cdc.gov/steadi/patient.html

## 2022-07-08 NOTE — PROGRESS NOTE ADULT - SUBJECTIVE AND OBJECTIVE BOX
Internal Medicine   Javad Yoder | PGY-1    OVERNIGHT EVENTS: Attempted elopement at 8pm, ativan given for agitation.    SUBJECTIVE:       MEDICATIONS  (STANDING):  apixaban 5 milliGRAM(s) Oral two times a day  ARIPiprazole 30 milliGRAM(s) Oral daily  hydrocortisone 1% Cream 1 Application(s) Topical once  levothyroxine 75 MICROGram(s) Oral daily  lidocaine   4% Patch 1 Patch Transdermal daily  lurasidone 40 milliGRAM(s) Oral daily  QUEtiapine 200 milliGRAM(s) Oral <User Schedule>  QUEtiapine 50 milliGRAM(s) Oral <User Schedule>  senna 2 Tablet(s) Oral at bedtime  valproic acid 500 milliGRAM(s) Oral every 24 hours  valproic acid 1000 milliGRAM(s) Oral every 24 hours    MEDICATIONS  (PRN):  acetaminophen     Tablet .. 650 milliGRAM(s) Oral every 6 hours PRN Temp greater or equal to 38C (100.4F), Mild Pain (1 - 3)  aluminum hydroxide/magnesium hydroxide/simethicone Suspension 30 milliLiter(s) Oral every 4 hours PRN Dyspepsia  LORazepam   Injectable 2 milliGRAM(s) IV Push every 4 hours PRN Agitation  melatonin 3 milliGRAM(s) Oral at bedtime PRN Insomnia    VITALS:  T(F): 98.6 (07-08-22 @ 05:45), Max: 98.6 (07-08-22 @ 05:45)  HR: 98 (07-08-22 @ 05:45) (66 - 99)  BP: 102/73 (07-08-22 @ 05:45) (102/73 - 119/83)  BP(mean): --  RR: 18 (07-08-22 @ 05:45) (17 - 18)  SpO2: 100% (07-08-22 @ 05:45) (100% - 100%)    PHYSICAL EXAM:   GENERAL: NAD, lying in bed comfortably  HEAD:  Atraumatic, normocephalic  EYES: EOMI, conjunctiva and sclera clear, no nystagmus noted  ENT: Moist mucous membranes  CHEST/LUNG: Clear to auscultation bilaterally; no rales, rhonchi, wheezing, or rubs; unlabored respirations  HEART: Regular rate and rhythm; no murmurs, rubs, or gallops, normal S1/S2  ABDOMEN: Normal bowel sounds; soft, nontender, nondistended, no organomegaly   EXTREMITIES:  No. clubbing, cyanosis, or edema  MSK: No gross deformities noted   Neurological: No focal deficits   SKIN: No rashes or lesions  PSYCH: Normal mood, affect     LABS:  Creatinine Trend: 0.83<--, 0.89<--, 0.87<--, 0.82<--, 0.77<--, 0.80<--       Internal Medicine   Javad Yoder | PGY-1    OVERNIGHT EVENTS: Attempted elopement at 8pm, ativan given for agitation.    SUBJECTIVE:   Today patient states that she feels better but endorses chronic neck pain that shoots down her spine. Also feels weak and with decreased appetite. Requests oxycodone for the pain, as tylenol doesn't help and makes her woozy. Denies fever, chills, lightheadedness, chest pain, shortness of breath.    MEDICATIONS  (STANDING):  apixaban 5 milliGRAM(s) Oral two times a day  ARIPiprazole 30 milliGRAM(s) Oral daily  hydrocortisone 1% Cream 1 Application(s) Topical once  levothyroxine 75 MICROGram(s) Oral daily  lidocaine   4% Patch 1 Patch Transdermal daily  lurasidone 40 milliGRAM(s) Oral daily  QUEtiapine 200 milliGRAM(s) Oral <User Schedule>  QUEtiapine 50 milliGRAM(s) Oral <User Schedule>  senna 2 Tablet(s) Oral at bedtime  valproic acid 500 milliGRAM(s) Oral every 24 hours  valproic acid 1000 milliGRAM(s) Oral every 24 hours    MEDICATIONS  (PRN):  acetaminophen     Tablet .. 650 milliGRAM(s) Oral every 6 hours PRN Temp greater or equal to 38C (100.4F), Mild Pain (1 - 3)  aluminum hydroxide/magnesium hydroxide/simethicone Suspension 30 milliLiter(s) Oral every 4 hours PRN Dyspepsia  LORazepam   Injectable 2 milliGRAM(s) IV Push every 4 hours PRN Agitation  melatonin 3 milliGRAM(s) Oral at bedtime PRN Insomnia    VITALS:  T(F): 98.6 (07-08-22 @ 05:45), Max: 98.6 (07-08-22 @ 05:45)  HR: 98 (07-08-22 @ 05:45) (66 - 99)  BP: 102/73 (07-08-22 @ 05:45) (102/73 - 119/83)  BP(mean): --  RR: 18 (07-08-22 @ 05:45) (17 - 18)  SpO2: 100% (07-08-22 @ 05:45) (100% - 100%)    PHYSICAL EXAM:   GENERAL: NAD, sitting upright in bed about to eat, movements slow  HEAD: Atraumatic, normocephalic  EYES: Right pupillary constriction delayed/diminished compared to left, EOMI, conjunctiva and sclera clear, slight nystagmus noted on right gaze  ENT: Moist mucous membranes  CHEST/LUNG: Clear to auscultation bilaterally; no rales, rhonchi, wheezing, or rubs; unlabored respirations  HEART: Regular rate and rhythm; no murmurs, rubs, or gallops, normal S1/S2  ABDOMEN: Normal bowel sounds; soft, tender to palpation in RUQ, nondistended, no organomegaly   EXTREMITIES:  No clubbing, cyanosis, or edema  MSK: No gross deformities noted   Neurological: Diminished sensation on R face. Other cranial nerves grossly intact.   SKIN: No rashes or lesions  PSYCH: Normal mood, affect     LABS:  Creatinine Trend: 0.83<--, 0.89<--, 0.87<--, 0.82<--, 0.77<--, 0.80<--

## 2022-07-08 NOTE — PROGRESS NOTE ADULT - PROBLEM SELECTOR PLAN 2
History of extensive bipolar disorder on multiple anti-psychotic medications at home. Hx notable for agitation and attempted elopement while admitted. Will plan to continue home medications pending inpatient behavioral health eval.  - Appreciate behavioral health consult recs:    - 1:1 observation    - Check valproate level, bHCG; monitor plts, LFTs, Na    - Abilify 30mg QD, Latuda 40mg at noon    - Depakote ER 500mg QD and 1000mg nightly    - Seroquel 200mg at 8am and 8pm, 50mg at 12pm    - HOLD Chlorpromazine 50mg Q6H History of extensive bipolar disorder on multiple anti-psychotic medications at home. Hx notable for agitation and attempted elopement while admitted. Will plan to continue home medications pending inpatient behavioral health eval.  - Appreciate behavioral health consult recs:    - 1:1 observation    - Monitor plts, LFTs, Na    - Abilify 30mg QD, Latuda 40mg at noon    - Depakote ER 500mg QD and 1000mg nightly    - Seroquel 200mg at 8am and 8pm, 50mg at 12pm    - HOLD Chlorpromazine 50mg Q6H

## 2022-07-08 NOTE — H&P ADULT - NSHPLABSRESULTS_GEN_ALL_CORE
Radiology:  -CT Head (7/5) No acute intracranial hemorrhage, brain edema, or mass effect.  -EEG (7/7): Mild diffuse cerebral dysfunction. No epileptiform pattern or seizure seen.

## 2022-07-09 PROCEDURE — 99232 SBSQ HOSP IP/OBS MODERATE 35: CPT

## 2022-07-09 PROCEDURE — 95720 EEG PHY/QHP EA INCR W/VEEG: CPT

## 2022-07-09 RX ORDER — DIPHENHYDRAMINE HCL 50 MG
25 CAPSULE ORAL AT BEDTIME
Refills: 0 | Status: DISCONTINUED | OUTPATIENT
Start: 2022-07-09 | End: 2022-07-11

## 2022-07-09 RX ORDER — VALPROIC ACID (AS SODIUM SALT) 250 MG/5ML
250 SOLUTION, ORAL ORAL
Refills: 0 | Status: DISCONTINUED | OUTPATIENT
Start: 2022-07-09 | End: 2022-07-10

## 2022-07-09 RX ADMIN — Medication 250 MILLIGRAM(S): at 17:05

## 2022-07-09 RX ADMIN — LURASIDONE HYDROCHLORIDE 40 MILLIGRAM(S): 40 TABLET ORAL at 12:45

## 2022-07-09 RX ADMIN — Medication 650 MILLIGRAM(S): at 09:36

## 2022-07-09 RX ADMIN — Medication 1 SPRAY(S): at 17:05

## 2022-07-09 RX ADMIN — Medication 1 SPRAY(S): at 05:25

## 2022-07-09 RX ADMIN — APIXABAN 5 MILLIGRAM(S): 2.5 TABLET, FILM COATED ORAL at 05:26

## 2022-07-09 RX ADMIN — Medication 25 MILLIGRAM(S): at 21:12

## 2022-07-09 RX ADMIN — Medication 50 MILLIGRAM(S): at 12:45

## 2022-07-09 RX ADMIN — Medication 650 MILLIGRAM(S): at 22:30

## 2022-07-09 RX ADMIN — Medication 650 MILLIGRAM(S): at 21:50

## 2022-07-09 RX ADMIN — ARIPIPRAZOLE 30 MILLIGRAM(S): 15 TABLET ORAL at 12:45

## 2022-07-09 RX ADMIN — Medication 75 MICROGRAM(S): at 05:25

## 2022-07-09 RX ADMIN — APIXABAN 5 MILLIGRAM(S): 2.5 TABLET, FILM COATED ORAL at 17:05

## 2022-07-09 RX ADMIN — QUETIAPINE FUMARATE 200 MILLIGRAM(S): 200 TABLET, FILM COATED ORAL at 19:30

## 2022-07-09 RX ADMIN — QUETIAPINE FUMARATE 200 MILLIGRAM(S): 200 TABLET, FILM COATED ORAL at 08:08

## 2022-07-09 RX ADMIN — PANTOPRAZOLE SODIUM 40 MILLIGRAM(S): 20 TABLET, DELAYED RELEASE ORAL at 05:26

## 2022-07-09 RX ADMIN — Medication 650 MILLIGRAM(S): at 10:12

## 2022-07-09 RX ADMIN — QUETIAPINE FUMARATE 50 MILLIGRAM(S): 200 TABLET, FILM COATED ORAL at 12:45

## 2022-07-09 NOTE — PROGRESS NOTE ADULT - ASSESSMENT
Patient is a right handed 37 year old female w/ PMHx obesity, factor V Leiden deficiency a/w DVT/PE (apixaban 5mg BID), seizures a/w ?pseudoseizures, bipolar disorder, endometriosis, hypothyroid, recently discharged on 7/2/22 after a visit for shoulder abscess and had drainage by IR, who then presented again to [facility] ED7/4/22 w/ complaint of lower back pain worsening over the prior day w/ MRI consistent w/ chronic degenerative disease during without acutely actionable findings. Patient was discharged and seated on a stretcher awaiting a ride when she endorsed dizziness to staff, started to have a witnessed seizure-like activity. Per chart review patient received lorazepam 2mg IM a total of 4 times & 3,000mg of levetiracetam in the ED for refractory seizures Patient transferred to [facility] EMU for event capture.       Impression  refractory seizure vs PNES in the setting of extensive history in EMU for event capture and possible AED management    Recommendations  VEEG  Depakote 1g IV daily  Seroquel 200/50/200 qday  rescue medications: zyprexa 2.5mg PRN, depakote 1g (second line), haldol 2mg IM PRN for agitation  Neuro checks Q4h  continue home medications  seizure precautions  psychiatry following, recs appreciated    Pulm  on RA    Cardio  normotensive  c/w telemetry    GI  regular diet   bowel regiment    Heme  monitor daily cbc  DVT ppx continue with home eliquis    Renal  monitor CMP  replete electrolytes as needed  monitor I&Os    Endocrine  c/w home synthroid 75 mcg    ID  no acute issues, continue to monitor vitals    Psych  continue with home psychiatric meds, psych at [facility] following.  1:1 observation      Case to be discussed with Dr. English   Patient is a right handed 37 year old female w/ PMHx obesity, factor V Leiden deficiency a/w DVT/PE (apixaban 5mg BID), seizures a/w ?pseudoseizures, bipolar disorder, endometriosis, hypothyroid, recently discharged on 7/2/22 after a visit for shoulder abscess and had drainage by IR, who then presented again to [facility] ED7/4/22 w/ complaint of lower back pain worsening over the prior day w/ MRI consistent w/ chronic degenerative disease during without acutely actionable findings. Patient was discharged and seated on a stretcher awaiting a ride when she endorsed dizziness to staff, started to have a witnessed seizure-like activity. Per chart review patient received lorazepam 2mg IM a total of 4 times & 3,000mg of levetiracetam in the ED for refractory seizures Patient transferred to [facility] EMU for event capture.       Impression  refractory seizure vs PNES in the setting of extensive history in EMU for event capture and possible AED management    Recommendations  VEEG  Decrease VPA to 250mg BID PO  Seroquel 200/50/200 qday  rescue medications: zyprexa 2.5mg PRN, depakote 1g (second line), haldol 2mg IM PRN for agitation  Neuro checks Q4h  continue home medications  seizure precautions  psychiatry following, recs appreciated    Pulm  on RA    Cardio  normotensive  c/w telemetry    GI  regular diet   bowel regiment    Heme  monitor daily cbc  DVT ppx continue with home eliquis    Renal  monitor CMP  replete electrolytes as needed  monitor I&Os    Endocrine  c/w home synthroid 75 mcg    ID  no acute issues, continue to monitor vitals    Psych  continue with home psychiatric meds, psych at [facility] following.  1:1 observation      Case discussed with Dr. English

## 2022-07-09 NOTE — PROGRESS NOTE ADULT - SUBJECTIVE AND OBJECTIVE BOX
YODIT MORROW  Female  MRN-[id number]    Subjective: No acute events overnight.       VITAL SIGNS:  T(F): 98.4  HR: 110  BP: 108/73  RR: 18  SpO2: 96%    MS: Oriented x3. Recent and remote recall intact. Fluent. Follows crossed commands. Patient able to verbalize past history.   CN: VFF. EOMI. V1-V3 intact. Face symmetric. T/u midline. Should shrug intact bilaterally.   Motor: Normal tone. Full strength throughout.   Sensory: Intact to LT and PP throughout   Reflexes: 2+ throughout. Babinski absent bilaterally.   Coordination: No dysmetria on FNF or ataxia on HTS bilaterally   Gait: Normal     LABS:                RADIOLOGY & ADDITIONAL STUDIES:      -CT Head (7/5) No acute intracranial hemorrhage, brain edema, or mass effect.  -EEG (7/7): Mild diffuse cerebral dysfunction. No epileptiform pattern or seizure seen.       YODIT MORROW  Female  MRN-[id number]    Subjective: No acute events overnight.       VITAL SIGNS:  T(F): 98.4  HR: 110  BP: 108/73  RR: 18  SpO2: 96%    MS: Oriented x3. Fluent. Follows crossed commands. Patient able to verbalize past history.   CN: EOMI. Face symmetric. T/u midline.   Motor: Normal tone. Full strength throughout.   Sensory: Intact to LT throughout   Coordination: No dysmetria on FNF   Gait: Deferred     LABS:                RADIOLOGY & ADDITIONAL STUDIES:      -CT Head (7/5) No acute intracranial hemorrhage, brain edema, or mass effect.  -EEG (7/7): Mild diffuse cerebral dysfunction. No epileptiform pattern or seizure seen.  -EEG 7/8:  EEG Summary:  Abnormal EEG in the awake, drowsy and asleep states.  Generalized, mild, slowing   Impression/Clinical Correlate:  This is an abnormal EEG in awake, drowsy and sleep states.   1. Mild diffuse cerebral dysfunction   2. No epileptiform pattern or seizure observed        YODIT MORROW  Female  MRN-[id number]    Subjective: No acute events overnight.       VITAL SIGNS:  T(F): 98.4  HR: 110  BP: 108/73  RR: 18  SpO2: 96%    MS: Oriented x3. Fluent.   CN: EOMI. Face symmetric. T/u midline.   Motor: Normal tone. Full strength throughout.   Sensory: Intact to LT throughout   Coordination: No dysmetria on FNF   Gait: Deferred     LABS:                RADIOLOGY & ADDITIONAL STUDIES:      -CT Head (7/5) No acute intracranial hemorrhage, brain edema, or mass effect.  -EEG (7/7): Mild diffuse cerebral dysfunction. No epileptiform pattern or seizure seen.  -EEG 7/8:  EEG Summary:  Abnormal EEG in the awake, drowsy and asleep states.  Generalized, mild, slowing   Impression/Clinical Correlate:  This is an abnormal EEG in awake, drowsy and sleep states.   1. Mild diffuse cerebral dysfunction   2. No epileptiform pattern or seizure observed        YODIT MORROW  Female  MRN-[id number]    Subjective: No acute events overnight.  ate, went to [facility]  psychogenic unresponsiveness migratory twitching behavior following rounds      VITAL SIGNS:  T(F): 98.4  HR: 110  BP: 108/73  RR: 18  SpO2: 96%    MS: Oriented x3. Fluent.   CN: EOMI. Face symmetric. T/u midline.   Motor: Normal tone. Full strength throughout.   Sensory: Intact to LT throughout   Coordination: No dysmetria on FNF   Gait: Deferred     LABS:    nl WBC, nl prolactin        RADIOLOGY & ADDITIONAL STUDIES:      -CT Head (7/5) No acute intracranial hemorrhage, brain edema, or mass effect.  -EEG (7/7): Mild diffuse cerebral dysfunction. No epileptiform pattern or seizure seen.  -EEG 7/8:  EEG Summary:  Abnormal EEG in the awake, drowsy and asleep states.  Generalized, mild, slowing   Impression/Clinical Correlate:  This is an abnormal EEG in awake, drowsy and sleep states.   1. Mild diffuse cerebral dysfunction   2. No epileptiform pattern or seizure observed

## 2022-07-09 NOTE — EEG REPORT - NS EEG TEXT BOX
EPILEPSY MONITORING UNIT REPORT   [facility]: [address], [place] [address], Ph#: [phone] [facility]: [address], [place] [address], Ph#: [phone] Office: 1 [facility] [address], [place] [address] Ph#: [phone]  [facility]: [address] [place] [address], Phone [phone] [place]  Office: [address] [place] [address], Phone [phone]  Patient Name: Jt Marte   Age: 37 year, : 1985 MRN #: -, Guerra:  D  D Referring Physician:  transfer  Study Started: 3:49:51 PM on 2022 Study Ended: hh:mm on xx/xx/xxxx                History:  38 yo Women with hx of FVL, insomnia, bipolar disorder, epilepsy vs PNES here for cluster of generalized seizures.    Interpretation:  Day 1 – 	Start: 2022 3:49:51 PM    	End: 2022 08:00 AM 	Duration: 16 hr 10min  Daily EEG Visual Analysis  FINDINGS:  The background was continuous, spontaneously variable and reactive. During wakefulness, the posterior dominant rhythm consisted of symmetric, well-modulated 8.5-9 Hz activity, with amplitude to 25 uV, that attenuated to eye opening.  Low amplitude frontal beta was noted in wakefulness.  Background Slowing: Occasional diffuse theta slowing   Focal Slowing:  None were present.  Sleep Background: Drowsiness was characterized by fragmentation, attenuation, and slowing of the background activity.   Sleep was characterized by the presence of vertex waves, symmetric sleep spindles and K-complexes.  Other Non-Epileptiform Findings: None were present. .  Activation Procedures:  Hyperventilation was not performed.   Photic stimulation was not performed.  Interictal Epileptiform Activity:  None were present.  Events: No events or seizures recorded.  Artifacts: Intermittent myogenic and movement artifacts were noted.  ECG: The heart rate on single channel ECG was predominantly between  BPM.  AEDs Depakote 1000 mg Daily   EEG Summary:  Abnormal EEG in the awake, drowsy and asleep states. Generalized, mild, slowing   Impression/Clinical Correlate:  This is an abnormal EEG in awake, drowsy and sleep states.  1. Mild diffuse cerebral dysfunction  2. No epileptiform pattern or seizure observed     Fletcher Lindsey MD  Fellow, [facility] [place]     Denis English M.D. Attending Physician, [facility] [place] 		    EPILEPSY MONITORING UNIT REPORT   [facility]: [address], [place] [address], Ph#: [phone] [facility]: [address], [place] [address], Ph#: [phone] Office: 1 [facility] [address], [place] [address] Ph#: [phone]  [facility]: [address] [place] [address], Phone [phone] [place]  Office: [address] [place] [address], Phone [phone]  Patient Name: Jt Marte   Age: 37 year, : 1985 MRN #: -, Guerra:  D  D Referring Physician:  transfer      History:  36 yo Women with hx of FVL, insomnia, bipolar disorder, epilepsy vs PNES here for cluster of generalized seizures.    Interpretation:  Day 1 – 	Start: 2022 3:49:51 PM    	End: 2022 08:00 AM 	Duration: 16 hr 10min  Daily EEG Visual Analysis  FINDINGS:  The background was continuous, spontaneously variable and reactive. During wakefulness, the posterior dominant rhythm consisted of symmetric, well-modulated 8.5-9 Hz activity, with amplitude to 25 uV, that attenuated to eye opening.  Low amplitude frontal beta was noted in wakefulness.  Background Slowing: Occasional diffuse theta slowing   Focal Slowing:  None were present.  Sleep Background: Drowsiness was characterized by fragmentation, attenuation, and slowing of the background activity.   Sleep was characterized by the presence of vertex waves, symmetric sleep spindles and K-complexes.  Other Non-Epileptiform Findings: None were present. .  Activation Procedures:  Hyperventilation was not performed.   Photic stimulation was not performed.  Interictal Epileptiform Activity:  None were present.  Events: No events or seizures recorded.  Artifacts: Intermittent myogenic and movement artifacts were noted.  ECG: The heart rate on single channel ECG was predominantly between  BPM.  AEDs Depakote 1000 mg Daily   EEG Summary:  Abnormal EEG in the awake, drowsy and asleep states. Generalized, mild, slowing   Impression/Clinical Correlate:  This is an abnormal EEG in awake, drowsy and sleep states.  1. Mild diffuse cerebral dysfunction  2. No epileptiform pattern or seizure observed     Fletcher Lindsey MD  Fellow, [facility] [place]     Denis English M.D. Attending Physician, [facility] [place]

## 2022-07-10 ENCOUNTER — TRANSCRIPTION ENCOUNTER (OUTPATIENT)
Age: 37
End: 2022-07-10

## 2022-07-10 LAB
CULTURE RESULTS: SIGNIFICANT CHANGE UP
CULTURE RESULTS: SIGNIFICANT CHANGE UP
HCT VFR BLD CALC: 34.6 % — SIGNIFICANT CHANGE UP (ref 34.5–45)
HGB BLD-MCNC: 11.1 G/DL — LOW (ref 11.5–15.5)
MCHC RBC-ENTMCNC: 30.1 PG — SIGNIFICANT CHANGE UP (ref 27–34)
MCHC RBC-ENTMCNC: 32.1 GM/DL — SIGNIFICANT CHANGE UP (ref 32–36)
MCV RBC AUTO: 93.8 FL — SIGNIFICANT CHANGE UP (ref 80–100)
NRBC # BLD: 0 /100 WBCS — SIGNIFICANT CHANGE UP (ref 0–0)
PLATELET # BLD AUTO: 158 K/UL — SIGNIFICANT CHANGE UP (ref 150–400)
RBC # BLD: 3.69 M/UL — LOW (ref 3.8–5.2)
RBC # FLD: 14.8 % — HIGH (ref 10.3–14.5)
SPECIMEN SOURCE: SIGNIFICANT CHANGE UP
SPECIMEN SOURCE: SIGNIFICANT CHANGE UP
WBC # BLD: 6.73 K/UL — SIGNIFICANT CHANGE UP (ref 3.8–10.5)
WBC # FLD AUTO: 6.73 K/UL — SIGNIFICANT CHANGE UP (ref 3.8–10.5)

## 2022-07-10 PROCEDURE — 95720 EEG PHY/QHP EA INCR W/VEEG: CPT

## 2022-07-10 PROCEDURE — 99232 SBSQ HOSP IP/OBS MODERATE 35: CPT

## 2022-07-10 RX ORDER — ACETAMINOPHEN 500 MG
975 TABLET ORAL ONCE
Refills: 0 | Status: COMPLETED | OUTPATIENT
Start: 2022-07-10 | End: 2022-07-10

## 2022-07-10 RX ADMIN — PANTOPRAZOLE SODIUM 40 MILLIGRAM(S): 20 TABLET, DELAYED RELEASE ORAL at 05:35

## 2022-07-10 RX ADMIN — POLYETHYLENE GLYCOL 3350 17 GRAM(S): 17 POWDER, FOR SOLUTION ORAL at 10:30

## 2022-07-10 RX ADMIN — Medication 1 SPRAY(S): at 17:11

## 2022-07-10 RX ADMIN — QUETIAPINE FUMARATE 50 MILLIGRAM(S): 200 TABLET, FILM COATED ORAL at 13:00

## 2022-07-10 RX ADMIN — Medication 75 MICROGRAM(S): at 05:35

## 2022-07-10 RX ADMIN — Medication 650 MILLIGRAM(S): at 12:30

## 2022-07-10 RX ADMIN — QUETIAPINE FUMARATE 200 MILLIGRAM(S): 200 TABLET, FILM COATED ORAL at 10:29

## 2022-07-10 RX ADMIN — APIXABAN 5 MILLIGRAM(S): 2.5 TABLET, FILM COATED ORAL at 17:06

## 2022-07-10 RX ADMIN — OLANZAPINE 2.5 MILLIGRAM(S): 15 TABLET, FILM COATED ORAL at 22:58

## 2022-07-10 RX ADMIN — ARIPIPRAZOLE 30 MILLIGRAM(S): 15 TABLET ORAL at 10:29

## 2022-07-10 RX ADMIN — Medication 650 MILLIGRAM(S): at 11:51

## 2022-07-10 RX ADMIN — Medication 25 MILLIGRAM(S): at 20:47

## 2022-07-10 RX ADMIN — Medication 1 SPRAY(S): at 05:35

## 2022-07-10 RX ADMIN — Medication 975 MILLIGRAM(S): at 19:00

## 2022-07-10 RX ADMIN — APIXABAN 5 MILLIGRAM(S): 2.5 TABLET, FILM COATED ORAL at 05:34

## 2022-07-10 RX ADMIN — Medication 1 MILLIGRAM(S): at 11:07

## 2022-07-10 RX ADMIN — LURASIDONE HYDROCHLORIDE 40 MILLIGRAM(S): 40 TABLET ORAL at 13:00

## 2022-07-10 RX ADMIN — Medication 1 MILLIGRAM(S): at 22:49

## 2022-07-10 RX ADMIN — Medication 250 MILLIGRAM(S): at 05:35

## 2022-07-10 RX ADMIN — QUETIAPINE FUMARATE 200 MILLIGRAM(S): 200 TABLET, FILM COATED ORAL at 20:46

## 2022-07-10 RX ADMIN — Medication 975 MILLIGRAM(S): at 16:15

## 2022-07-10 NOTE — DISCHARGE NOTE PROVIDER - NSDCCPCAREPLAN_GEN_ALL_CORE_FT
PRINCIPAL DISCHARGE DIAGNOSIS  Diagnosis: Psychogenic nonepileptic seizure  Assessment and Plan of Treatment: Continue psych medications as indicated   regular diet   correction of electrolytes as needed   pt/ot   Will need to follow up with adriana as an outpaitient          PRINCIPAL DISCHARGE DIAGNOSIS  Diagnosis: Psychogenic nonepileptic seizure  Assessment and Plan of Treatment: Follow up with your Primary Care Physician within the next 2-3 days  Follow up with your Neurologist for routine visit  Follow up with Psychiatry within the next 2 weeks  Continue medications as reconciled  Contact your Neurologist, Primary Care Provider or report to the Emergency Room if you experience new or worsening symptoms   Will need to follow up with adriaan as an outpaitient

## 2022-07-10 NOTE — PROVIDER CONTACT NOTE (MEDICATION) - SITUATION
Pt reporting significant headache pain. Had headache post event at approximately 11:25; upon reassessment at 12:20 she was sleeping. Now reporting increased headache pain, worse than before 7/10.

## 2022-07-10 NOTE — DISCHARGE NOTE PROVIDER - PROVIDER TOKENS
PROVIDER:[TOKEN:[[id number]],FOLLOWUP:[1 month]] PROVIDER:[TOKEN:[[id number]],FOLLOWUP:[Routine],ESTABLISHEDPATIENT:[T]],PROVIDER:[TOKEN:[[id number]],FOLLOWUP:[Routine],ESTABLISHEDPATIENT:[T]]

## 2022-07-10 NOTE — DISCHARGE NOTE PROVIDER - NSDCFUSCHEDAPPT_GEN_ALL_CORE_FT
Raoul Weller  [facility]  CARDIOLOGY [address]  Scheduled Appointment: 07/11/2022    [address]  Scheduled Appointment: 07/22/2022    Omar Hernandez  [address]  Scheduled Appointment: 07/22/2022     [address]  Scheduled Appointment: 07/22/2022    Omar Hernandez  [address]  Scheduled Appointment: 07/22/2022

## 2022-07-10 NOTE — EEG REPORT - NS EEG TEXT BOX
Day 2  – 	Start: 7/9/2022 08:00 AM     	End: 7/10/2022 08:00 AM  	Duration: 24hr 00 min     Daily EEG Visual Analysis    FINDINGS:  The background was continuous, spontaneously variable and reactive. During wakefulness, the posterior dominant rhythm consisted of symmetric, well-modulated 9-10 Hz activity, with amplitude to 25 uV, that attenuated to eye opening.  Low amplitude frontal beta was noted in wakefulness.    Background Slowing:  None were present    Focal Slowing:   None were present.    Sleep Background:  Drowsiness was characterized by fragmentation, attenuation, and slowing of the background activity.    Sleep was characterized by the presence of vertex waves, symmetric sleep spindles and K-complexes.    Other Non-Epileptiform Findings:  None were present.      Activation Procedures:   Hyperventilation was not performed.    Photic stimulation was not performed.    Interictal Epileptiform Activity:   None were present.    Events:    Event 1:   d1 13:14:17		ECG 90BPM baseline  d1 13:14:50		after rounds, turns head to right, not responding to aide  d1 13:17:19		eyes open, mostly sitting still  d1 13:17:52		ECG 120bpm  d1 13:20:20		ongoing ictal atonia  d1 13:20:38		tactile stimulation no response  d1 13:21:26		some intermittent erratic migratory  twitching behaviors  d1 13:35:44		HR 140s rapid shallow breathing  d1 13:36:34		trembling artifacts Right side with pillow  d1 13:36:53		rocking slightly  d1 13:42:02		not responding to question  d1 13:44:11		R arm oscillating movements  d1 13:44:28		axial stiffening, flexing both arms from elbow  d1 13:51:15		R arm trembling  d1 13:52:38		rocking forward motion  d1 13:54:31		more rocking behaviors  d1 14:04:28		prolonged psychogenic event, seems resolving now    Normal background, no electrographic correlate throughout these events     Event2:   d1 15:37:04		Patient triggers event button  d1 15:37:41		intermittent L finger twitching, low amplitude variable frequency    No electrographic correlate     Artifacts:  Intermittent myogenic and movement artifacts were noted.    ECG:  The heart rate on single channel ECG was predominantly between  BPM.    AEDs Depakote 1000 mg Daily       EEG Summary:  Abnormal EEG in the awake, drowsy and asleep states.  Generalized, mild, slowing  Two typical clinical events on day 2 without electrographic abnormalities associated       Impression/Clinical Correlate:  Two typical prolonged clinical events on day 2 without electrographic abnormalities associated, clinically most concerning for psychogenic etiology aka psychogenic nonepileptic attacks or functional seizures.  Mild diffuse cerebral dysfunction   No epileptiform pattern or seizure observed      Day 2  – 	Start: 7/9/2022 08:00 AM     	End: 7/10/2022 08:00 AM  	Duration: 24hr 00 min     Daily EEG Visual Analysis    FINDINGS:  The background was continuous, spontaneously variable and reactive. During wakefulness, the posterior dominant rhythm consisted of symmetric, well-modulated 9-10 Hz activity, with amplitude to 25 uV, that attenuated to eye opening.  Low amplitude frontal beta was noted in wakefulness.    Background Slowing:  None were present    Focal Slowing:   None were present.    Sleep Background:  Drowsiness was characterized by fragmentation, attenuation, and slowing of the background activity.    Sleep was characterized by the presence of vertex waves, symmetric sleep spindles and K-complexes.    Other Non-Epileptiform Findings:  None were present.      Activation Procedures:   Hyperventilation was not performed.    Photic stimulation was not performed.    Interictal Epileptiform Activity:   None were present.    Events:    Event 1:   d1 13:14:17		ECG 90BPM baseline  d1 13:14:50		after rounds, turns head to right, not responding to aide  d1 13:17:19		eyes open, mostly sitting still  d1 13:17:52		ECG 120bpm  d1 13:20:20		ongoing ictal atonia  d1 13:20:38		tactile stimulation no response  d1 13:21:26		some intermittent erratic migratory  twitching behaviors  d1 13:35:44		HR 140s rapid shallow breathing  d1 13:36:34		trembling artifacts Right side with pillow  d1 13:36:53		rocking slightly  d1 13:42:02		not responding to question  d1 13:44:11		R arm oscillating movements  d1 13:44:28		axial stiffening, flexing both arms from elbow  d1 13:51:15		R arm trembling  d1 13:52:38		rocking forward motion  d1 13:54:31		more rocking behaviors  d1 14:04:28		prolonged psychogenic event, seems resolving now    Normal background, no electrographic correlate throughout these events     Event2:   d1 15:37:04		Patient triggers event button  d1 15:37:41		intermittent L finger twitching, low amplitude variable frequency    No electrographic correlate     Artifacts:  Intermittent myogenic and movement artifacts were noted.    ECG:  The heart rate on single channel ECG was predominantly between  BPM.    AEDs Depakote 1000 mg Daily       EEG Summary:  Abnormal EEG in the awake, drowsy and asleep states.  Generalized, mild, slowing  Two typical clinical events on day 2 without electrographic abnormalities associated       Impression/Clinical Correlate:  Two typical prolonged clinical events on day 2 without electrographic abnormalities associated, clinically most concerning for psychogenic etiology aka psychogenic nonepileptic attacks or functional seizures.  Mild diffuse cerebral dysfunction   No epileptiform pattern or seizure observed     MD ANJALI Rob  Director, Continuous EEG Monitoring Program, [facility]   and Epilepsy Fellowship ,   Department of Neurology, [facility] EEG Reading Room Ph#: [phone]  Epilepsy Answering Service after 5PM and before 8:30AM: Ph#: [phone]

## 2022-07-10 NOTE — CHART NOTE - NSCHARTNOTEFT_GEN_A_CORE
Notified by RN ~ 22:05 for patient becoming unresponsive. Upon my exam, patient is lying in bed, no signs of acute distress, with eyes closed and not following commands. She was satting at 100% on room air, tachycardiac as high as 150, and /82 mmhg. Similar episode during the day with no eeg correlate. Patient did receive 1 mg of ativan at that time with improvement. After about 10 minutes, patient began leaning to R side with R head turn and whole body twitching/ trembling. O2 saturation temporarily dropped to ~ 70% and patient was placed on non rebreather. Called epilepsy attending on call, Dr. English, who reviewed current EEG and noted no seizure activity. 1 mg of ativan administered for psychogenic event for behavior. Similar episode occurred after ativan was administered. 2.5 mg zyprexa IM was then administered for behavior. Of note, patient was taken off home dose of depakote today. Patient will be taken off non rebreather and monitored on room air. No signs of seizure activity at this time on EEG, episodes continue to appear to appear psychogenic and will be treated for patient's comfort primarily. Reassurance provided to patient.     Patient will continue to be monitored under constant observation with a 1:1, on EMU monitoring video, and with q 4 hour vital sign and neuro checks.    Plan of care discussed with RN at bedside and epilepsy attending over the phone.

## 2022-07-10 NOTE — PROGRESS NOTE ADULT - ASSESSMENT
Patient is a right handed 37 year old female w/ PMHx obesity, factor V Leiden deficiency a/w DVT/PE (apixaban 5mg BID), seizures a/w ?pseudoseizures, bipolar disorder, endometriosis, hypothyroid, recently discharged on 7/2/22 after a visit for shoulder abscess and had drainage by IR, who then presented again to [facility] ED7/4/22 w/ complaint of lower back pain worsening over the prior day w/ MRI consistent w/ chronic degenerative disease during without acutely actionable findings. Patient was discharged and seated on a stretcher awaiting a ride when she endorsed dizziness to staff, started to have a witnessed seizure-like activity. Per chart review patient received lorazepam 2mg IM a total of 4 times & 3,000mg of levetiracetam in the ED for refractory seizures Patient transferred to [facility] EMU for event capture.       Impression  refractory seizure vs PNES in the setting of extensive history in EMU for event capture and possible AED management    Recommendations  VEEG  Continue on VPA to 250mg BID PO  Seroquel 200/50/200 qday  rescue medications: zyprexa 2.5mg PRN, depakote 1g (second line), haldol 2mg IM PRN for agitation  Neuro checks Q4h  continue home medications  seizure precautions  psychiatry following, recs appreciated    Pulm  on RA    Cardio  normotensive  c/w telemetry    GI  regular diet   bowel regiment    Heme  monitor daily cbc  DVT ppx continue with home eliquis    Renal  monitor CMP  replete electrolytes as needed  monitor I&Os    Endocrine  c/w home synthroid 75 mcg    ID  no acute issues, continue to monitor vitals    Psych  continue with home psychiatric meds, psych at [facility] following will see tomorrow   1:1 observation      Case discussed with Dr. English Patient is a right handed 37 year old female w/ PMHx obesity, factor V Leiden deficiency a/w DVT/PE (apixaban 5mg BID), seizures a/w ?pseudoseizures, bipolar disorder, endometriosis, hypothyroid, recently discharged on 7/2/22 after a visit for shoulder abscess and had drainage by IR, who then presented again to [facility] ED7/4/22 w/ complaint of lower back pain worsening over the prior day w/ MRI consistent w/ chronic degenerative disease during without acutely actionable findings. Patient was discharged and seated on a stretcher awaiting a ride when she endorsed dizziness to staff, started to have a witnessed seizure-like activity. Per chart review patient received lorazepam 2mg IM a total of 4 times & 3,000mg of levetiracetam in the ED for refractory seizures Patient transferred to [facility] EMU for event capture.       Impression  Electrographic confirmed Nonepileptic functional disorder    Recommendations  VEEG  discontinued VPA 250mg BID PO  Seroquel 200/50/200 qday  rescue medications: zyprexa 2.5mg PRN, depakote 1g (second line), haldol 2mg IM PRN for agitation  Neuro checks Q4h  continue home medications  seizure precautions  psychiatry following, recs appreciated\  Will be discharged home on 7/11 or 7/12 pending pysch recommendations.     Pulm  on RA    Cardio  normotensive  c/w telemetry    GI  regular diet   bowel regiment    Heme  monitor daily cbc  DVT ppx continue with home eliquis    Renal  monitor CMP  replete electrolytes as needed  monitor I&Os    Endocrine  c/w home synthroid 75 mcg    ID  no acute issues, continue to monitor vitals    Psych  continue with home psychiatric meds, psych at [facility] following will see tomorrow   1:1 observation      Case discussed with Dr. English Patient is a right handed 37 year old female w/ PMHx obesity, factor V Leiden deficiency a/w DVT/PE (apixaban 5mg BID), bipolar disorder, endometriosis, hypothyroid, recently discharged on 7/2/22 after a visit for shoulder abscess and had drainage by IR, who then presented again to [facility] ED7/4/22 w/ complaint of lower back pain worsening over the prior day w/ MRI consistent w/ chronic degenerative disease during without acutely actionable findings. Patient was discharged and seated on a stretcher awaiting a ride when she endorsed dizziness to staff, started to have a witnessed seizure-like activity. Per chart review patient received lorazepam 2mg IM a total of 4 times & 3,000mg of levetiracetam in the ED for refractory seizures Patient transferred to [facility] EMU for event capture.       Impression  Electrographic confirmed Nonepileptic seizures/functional neurological disorder    Recommendations  VEEG  discontinued VPA 250mg BID PO  Seroquel 200/50/200 qday  rescue medications: zyprexa 2.5mg PRN, depakote 1g (second line), haldol 2mg IM PRN for agitation  Neuro checks Q4h  continue home medications  seizure precautions  psychiatry following, recs appreciated\  Will be discharged home on 7/11 or 7/12 pending pysch recommendations.     Pulm  on RA    Cardio  normotensive  c/w telemetry    GI  regular diet   bowel regiment    Heme  monitor daily cbc  DVT ppx continue with home eliquis    Renal  monitor CMP  replete electrolytes as needed  monitor I&Os    Endocrine  c/w home synthroid 75 mcg    ID  no acute issues, continue to monitor vitals    Psych  continue with home psychiatric meds, psych at [facility] following will see tomorrow   1:1 observation      Case discussed with Dr. English

## 2022-07-10 NOTE — DISCHARGE NOTE PROVIDER - HOSPITAL COURSE
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 Patient was discharged and seated on a stretcher awaiting a ride when she endorsed dizziness to staff, states that she felt like she was going to have a seizure, and was noted to start having "tonic-clonic seizure-like activity with truncal spasms, pupils 4-5 mm non-reactive, involuntary nystagmus and lack of pupillary reflex / dolls eyes," per ED examination. Patient reportedly without responsiveness during this event, and noted to be tachypneic to the 40s w/ shallow respirations. Per chart review patient was given lorazepam 2mg IM a total of 4 times in the ED or refractory seizures. Patient was also loaded with 3,000mg of levetiracetam.  Per chart review, patient seen by Neurologist Dr. Sanches in 6/15/22 for abnormal movements. At that time, that particular episode was consistent with her pseudoseizures. Patient follows with Dr. Byers in outpatient setting. Per outpatient note, patient has "?aura vs anxiety characterized as abdominal fluttering." Patient was on levetiracetam and switched to VPA at [place] due to concern for abdominal discomfort. Unclear whether there was any complaint of mood change (i.e., irritability) on levetiracetam.   Patient was followed by [facility] neurology and during hospital stay cases was discussed with Dr. Miller. Patient transferred to [facility] EMU for further management.   Hospital Course:   : Transferred to EMU service and continued AEDs. Psych informed and will follow up on .   : Patient had depakote titrated to 250mg PO Depakote. Continued psych medications. Patient had episodes which had no correlate.   7/10: Patient was discontinued on Depakote. Patient had one episode, however was eeg negative. Thus was considered to be LUDY activity.   : psych follow up. Possible DC back to group home .          Radiology;   CT head w/o :   MPRESSION:  No acute intracranial hemorrhage, brain edema, or mass effect.  No displaced calvarial fracture.    EE/7  EEG SUMMARY/CLASSIFICATION  Abnormal EEG in the awake, drowsy, and asleep states.  Mild slowing  _____________________________________________________________  EEG IMPRESSION/CLINICAL CORRELATE  Mild diffuse cerebral dysfunction.  No epileptiform pattern or seizure seen.      EEG    Impression/Clinical Correlate:    This is an abnormal EEG in awake, drowsy and sleep states.   1. Mild diffuse cerebral dysfunction   2. No epileptiform pattern or seizure observed    7/10:   Impression/Clinical Correlate:  Two typical prolonged clinical events on day 2 without electrographic abnormalities associated, clinically most concerning for psychogenic etiology aka psychogenic nonepileptic attacks or functional seizures.  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 Patient was discharged and seated on a stretcher awaiting a ride when she endorsed dizziness to staff, states that she felt like she was going to have a seizure, and was noted to start having "tonic-clonic seizure-like activity with truncal spasms, pupils 4-5 mm non-reactive, involuntary nystagmus and lack of pupillary reflex / dolls eyes," per ED examination. Patient reportedly without responsiveness during this event, and noted to be tachypneic to the 40s w/ shallow respirations. Per chart review patient was given lorazepam 2mg IM a total of 4 times in the ED or refractory seizures. Patient was also loaded with 3,000mg of levetiracetam.  Per chart review, patient seen by Neurologist Dr. Sanches in 6/15/22 for abnormal movements. At that time, that particular episode was consistent with her pseudoseizures. Patient follows with Dr. Byers in outpatient setting. Per outpatient note, patient has "?aura vs anxiety characterized as abdominal fluttering." Patient was on levetiracetam and switched to VPA at [place] due to concern for abdominal discomfort. Unclear whether there was any complaint of mood change (i.e., irritability) on levetiracetam.   Patient was followed by [facility] neurology and during hospital stay cases was discussed with Dr. Miller. Patient transferred to [facility] EMU for further management.   Hospital Course:   : Transferred to EMU service and continued AEDs. Psych informed and will follow up on .   : Patient had depakote titrated to 250mg PO Depakote. Continued psych medications. Patient had episodes which had no correlate.   7/10: Patient was discontinued on Depakote. Patient had one episode, however was eeg negative. Thus was considered to be LUDY activity.   : psych follow up. Possible DC back to group home .          Radiology;   CT head w/o :   MPRESSION:  No acute intracranial hemorrhage, brain edema, or mass effect.  No displaced calvarial fracture.    EE/7  EEG SUMMARY/CLASSIFICATION  Abnormal EEG in the awake, drowsy, and asleep states.  Mild slowing  _____________________________________________________________  EEG IMPRESSION/CLINICAL CORRELATE  Mild diffuse cerebral dysfunction.  No epileptiform pattern or seizure seen.      EEG    Impression/Clinical Correlate:    This is an abnormal EEG in awake, drowsy and sleep states.   1. Mild diffuse cerebral dysfunction   2. No epileptiform pattern or seizure observed    7/10:   Impression/Clinical Correlate:  Two typical prolonged clinical events on day 2 without electrographic abnormalities associated, clinically most concerning for psychogenic etiology aka psychogenic nonepileptic attacks or functional seizures.  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 Patient was discharged and seated on a stretcher awaiting a ride when she endorsed dizziness to staff, states that she felt like she was going to have a seizure, and was noted to start having "tonic-clonic seizure-like activity with truncal spasms, pupils 4-5 mm non-reactive, involuntary nystagmus and lack of pupillary reflex / dolls eyes," per ED examination. Patient reportedly without responsiveness during this event, and noted to be tachypneic to the 40s w/ shallow respirations. Per chart review patient was given lorazepam 2mg IM a total of 4 times in the ED or refractory seizures. Patient was also loaded with 3,000mg of levetiracetam.  Per chart review, patient seen by Neurologist Dr. Sanches in 6/15/22 for abnormal movements. At that time, that particular episode was consistent with her pseudoseizures. Patient follows with Dr. Byers in outpatient setting. Per outpatient note, patient has "?aura vs anxiety characterized as abdominal fluttering." Patient was on levetiracetam and switched to VPA at [place] due to concern for abdominal discomfort. Unclear whether there was any complaint of mood change (i.e., irritability) on levetiracetam.   Patient was followed by [facility] neurology and during hospital stay cases was discussed with Dr. Miller. Patient transferred to [facility] EMU for further management.   Hospital Course:   : Transferred to EMU service and continued AEDs. Psych informed and will follow up on .   : Patient had depakote titrated to 250mg PO Depakote. Continued psych medications. Patient had episodes which had no correlate.   7/10: Patient was discontinued on Depakote. Patient had one episode, however was eeg negative. Thus was considered to be LUDY activity.   : psych follow up. Possible DC back to group home .          Radiology;   CT head w/o :   MPRESSION:  No acute intracranial hemorrhage, brain edema, or mass effect.  No displaced calvarial fracture.    EE/7  EEG SUMMARY/CLASSIFICATION  Abnormal EEG in the awake, drowsy, and asleep states.  Mild slowing  _____________________________________________________________  EEG IMPRESSION/CLINICAL CORRELATE  Mild diffuse cerebral dysfunction.  No epileptiform pattern or seizure seen.      EEG    Impression/Clinical Correlate:    This is an abnormal EEG in awake, drowsy and sleep states.   1. Mild diffuse cerebral dysfunction   2. No epileptiform pattern or seizure observed    7/10:   Impression/Clinical Correlate:  Two typical prolonged clinical events on day 2 without electrographic abnormalities associated, clinically most concerning for psychogenic etiology aka psychogenic nonepileptic attacks or functional seizures.  Mild diffuse cerebral dysfunction   No epileptiform pattern or seizure observed     Impression:   EEG confirmed functional LUDY   Recommendations:   Continue psych medications as indicated   Continue on Trileptal as indicated   regular diet   correction of electrolytes as needed   pt/ot   Patient can resume all previous activities, diet and treatment.   Clear to resume all activities.   Follow up on psych recommendations              Patient is a right handed 37 year old female w/ PMHx obesity, factor V Leiden deficiency a/w DVT/PE (apixaban 5mg BID), seizures a/w ?pseudoseizures, bipolar disorder, endometriosis, hypothyroid, recently discharged on 22 after a visit for shoulder abscess and had drainage by IR, who then presented again to [facility] ED22 w/ complaint of lower back pain worsening over the prior day w/ MRI consistent w/ chronic degenerative disease during without acutely actionable findings. Patient was discharged and seated on a stretcher awaiting a ride when she endorsed dizziness to staff, states that she felt like she was going to have a seizure, and was noted to start having "tonic-clonic seizure-like activity with truncal spasms, pupils 4-5 mm non-reactive, involuntary nystagmus and lack of pupillary reflex / dolls eyes," per ED examination. Patient reportedly without responsiveness during this event, and noted to be tachypneic to the 40s w/ shallow respirations. Per chart review patient was given lorazepam 2mg IM a total of 4 times in the ED or refractory seizures. Patient was also loaded with 3,000mg of levetiracetam.  Per chart review, patient seen by Neurologist Dr. Sanches in 6/15/22 for abnormal movements. At that time, that particular episode was consistent with her pseudoseizures. Patient follows with Dr. Byers in outpatient setting. Per outpatient note, patient has "?aura vs anxiety characterized as abdominal fluttering." Patient was on levetiracetam and switched to VPA at [place] due to concern for abdominal discomfort. Unclear whether there was any complaint of mood change (i.e., irritability) on levetiracetam.   Patient was followed by [facility] neurology and during hospital stay cases was discussed with Dr. Miller. Patient transferred to [facility] EMU for further management.   Hospital Course:   : Transferred to EMU service and continued AEDs. Psych informed and will follow up on .   : Patient had depakote titrated to 250mg PO Depakote. Continued psych medications. Patient had episodes which had no correlate.   7/10: Patient was discontinued on Depakote. Patient had one episode, however was eeg negative. Thus was considered to be LUDY activity.   : psych follow up. Possible DC back to group home .          Radiology;   CT head w/o :   MPRESSION:  No acute intracranial hemorrhage, brain edema, or mass effect.  No displaced calvarial fracture.    EE/7  EEG SUMMARY/CLASSIFICATION  Abnormal EEG in the awake, drowsy, and asleep states.  Mild slowing  _____________________________________________________________  EEG IMPRESSION/CLINICAL CORRELATE  Mild diffuse cerebral dysfunction.  No epileptiform pattern or seizure seen.      EEG    Impression/Clinical Correlate:    This is an abnormal EEG in awake, drowsy and sleep states.   1. Mild diffuse cerebral dysfunction   2. No epileptiform pattern or seizure observed    7/10:   Impression/Clinical Correlate:  Two typical prolonged clinical events on day 2 without electrographic abnormalities associated, clinically most concerning for psychogenic etiology aka psychogenic nonepileptic attacks or functional seizures.  Mild diffuse cerebral dysfunction   No epileptiform pattern or seizure observed     Impression:   EEG confirmed functional LUDY   Recommendations:   Continue psych medications as indicated   Continue on Trileptal as indicated   regular diet   correction of electrolytes as needed   pt/ot   Patient can resume all previous activities, diet, treatment, and previous medications except for the ones discontinued by provider.   Clear to resume all activities.   Follow up on psych recommendations              Patient is a right handed 37 year old female w/ PMHx obesity, factor V Leiden deficiency a/w DVT/PE (apixaban 5mg BID), seizures a/w ?pseudoseizures, bipolar disorder, endometriosis, hypothyroid, recently discharged on 22 after a visit for shoulder abscess and had drainage by IR, who then presented again to [facility] ED22 w/ complaint of lower back pain worsening over the prior day w/ MRI consistent w/ chronic degenerative disease during without acutely actionable findings. Patient was discharged and seated on a stretcher awaiting a ride when she endorsed dizziness to staff, states that she felt like she was going to have a seizure, and was noted to start having "tonic-clonic seizure-like activity with truncal spasms, pupils 4-5 mm non-reactive, involuntary nystagmus and lack of pupillary reflex / dolls eyes," per ED examination. Patient reportedly without responsiveness during this event, and noted to be tachypneic to the 40s w/ shallow respirations. Per chart review patient was given lorazepam 2mg IM a total of 4 times in the ED or refractory seizures. Patient was also loaded with 3,000mg of levetiracetam.  Per chart review, patient seen by Neurologist Dr. Sanches in 6/15/22 for abnormal movements. At that time, that particular episode was consistent with her pseudoseizures. Patient follows with Dr. Byers in outpatient setting. Per outpatient note, patient has "?aura vs anxiety characterized as abdominal fluttering." Patient was on levetiracetam and switched to VPA at [place] due to concern for abdominal discomfort. Unclear whether there was any complaint of mood change (i.e., irritability) on levetiracetam.   Patient was followed by [facility] neurology and during hospital stay cases was discussed with Dr. Miller. Patient transferred to [facility] EMU for further management.   Hospital Course:   : Transferred to EMU service and continued AEDs. Psych informed and will follow up on .   : Patient had depakote titrated to 250mg PO Depakote. Continued psych medications. Patient had episodes which had no correlate.   7/10: Patient was discontinued on Depakote. Patient had one episode, however was eeg negative. Thus was considered to be LUDY activity.   : psych follow up. Possible DC back to group home .           Radiology;   CT head w/o :   MPRESSION:  No acute intracranial hemorrhage, brain edema, or mass effect.  No displaced calvarial fracture.    EE/7  EEG SUMMARY/CLASSIFICATION  Abnormal EEG in the awake, drowsy, and asleep states.  Mild slowing  _____________________________________________________________  EEG IMPRESSION/CLINICAL CORRELATE  Mild diffuse cerebral dysfunction.  No epileptiform pattern or seizure seen.      EEG    Impression/Clinical Correlate:    This is an abnormal EEG in awake, drowsy and sleep states.   1. Mild diffuse cerebral dysfunction   2. No epileptiform pattern or seizure observed    7/10:   Impression/Clinical Correlate:  Two typical prolonged clinical events on day 2 without electrographic abnormalities associated, clinically most concerning for psychogenic etiology aka psychogenic nonepileptic attacks or functional seizures.  Mild diffuse cerebral dysfunction   No epileptiform pattern or seizure observed     Impression:   EEG confirmed functional LUDY   Recommendations:   Continue psych medications as indicated   Continue on Trileptal as indicated   regular diet   correction of electrolytes as needed   pt/ot   Patient can resume all previous activities, diet, treatment, and previous medications except for the ones discontinued by provider.   Clear to resume all activities.   Follow up on psych recommendations

## 2022-07-10 NOTE — PROGRESS NOTE ADULT - SUBJECTIVE AND OBJECTIVE BOX
[id number]  Patient is a 37y old  Female who presents with a chief complaint of seizure (09 Jul 2022 11:35    Subjective: There were no overnight events noted.     Objective:   Home Medications:  albuterol 90 mcg/inh inhalation aerosol: 2 puff(s) inhaled every 6 hours, As needed, Shortness of Breath and/or Wheezing (05 Jul 2022 17:40)  ARIPiprazole 30 mg oral tablet: 1 tab(s) orally once a day (05 Jul 2022 17:40)  docusate sodium 100 mg oral capsule: 1 cap(s) orally once a day (at bedtime), As Needed (05 Jul 2022 17:40)  fluticasone 50 mcg/inh nasal spray: 1 spray(s) nasal 2 times a day (05 Jul 2022 17:40)  levothyroxine 75 mcg (0.075 mg) oral tablet: 1 tab(s) orally once a day (05 Jul 2022 17:40)  lidocaine 4% topical film: Apply topically to affected area once a day (08 Jul 2022 12:11)  lurasidone 40 mg oral tablet: 1 tab(s) orally once a day at noon (08 Jul 2022 13:46)  melatonin 3 mg oral tablet: 1 tab(s) orally once a day (at bedtime) (05 Jul 2022 17:40)  Multiple Vitamins with Iron oral tablet: 1 tab(s) orally once a day (05 Jul 2022 17:40)  olopatadine 0.1% ophthalmic solution: 1 drop(s) to each affected eye every 6 hours, As Needed (05 Jul 2022 17:40)  pantoprazole 40 mg oral delayed release tablet: 1 tab(s) orally once a day (before a meal) (05 Jul 2022 17:40)  polyethylene glycol 3350 oral powder for reconstitution: 17 gram(s) orally once a day (05 Jul 2022 17:40)  QUEtiapine 200 mg oral tablet: 1 tab(s) orally 2 times a day at 8 am and 8pm (08 Jul 2022 13:46)  QUEtiapine 50 mg oral tablet: 1 tab(s) orally once a day at noon (08 Jul 2022 13:46)  tiZANidine 2 mg oral tablet: 2 tab(s) orally 2 times a day, As Needed (05 Jul 2022 17:40)  Tylenol 325 mg oral tablet: 2 tab(s) orally every 6 hours, As Needed (05 Jul 2022 17:40)  valproic acid 250 mg oral capsule: 2 cap(s) orally every 24 hours (07 Jul 2022 11:36)  valproic acid 250 mg oral capsule: 4 cap(s) orally every 24 hours (07 Jul 2022 11:36)  Vitamin D2 1.25 mg (50,000 intl units) oral capsule: 1 cap(s) orally once a week (05 Jul 2022 17:40)    MEDICATIONS  (STANDING):  apixaban 5 milliGRAM(s) Oral two times a day  ARIPiprazole 30 milliGRAM(s) Oral daily  fluticasone propionate 50 MICROgram(s)/spray Nasal Spray 1 Spray(s) Both Nostrils two times a day  levothyroxine 75 MICROGram(s) Oral daily  lurasidone 40 milliGRAM(s) Oral daily  pantoprazole    Tablet 40 milliGRAM(s) Oral before breakfast  polyethylene glycol 3350 17 Gram(s) Oral daily  QUEtiapine 200 milliGRAM(s) Oral two times a day  QUEtiapine 50 milliGRAM(s) Oral <User Schedule>  valproic acid 250 milliGRAM(s) Oral two times a day    MEDICATIONS  (PRN):  acetaminophen     Tablet .. 650 milliGRAM(s) Oral every 6 hours PRN Moderate Pain (4 - 6)  ALBUTerol    90 MICROgram(s) HFA Inhaler 2 Puff(s) Inhalation every 6 hours PRN Shortness of Breath and/or Wheezing  diphenhydrAMINE 25 milliGRAM(s) Oral at bedtime PRN Insomnia  haloperidol    Injectable 2 milliGRAM(s) IntraMuscular every 6 hours PRN Agitation  OLANZapine Injectable 2.5 milliGRAM(s) IntraMuscular once PRN agitation/seizure  senna 2 Tablet(s) Oral at bedtime PRN Constipation  valproate sodium  IVPB 1000 milliGRAM(s) IV Intermittent once PRN seizure    Vital Signs Last 24 Hrs  T(C): 36.6 (10 Jul 2022 05:00), Max: 37.1 (09 Jul 2022 20:15)  T(F): 97.9 (10 Jul 2022 05:00), Max: 98.8 (09 Jul 2022 20:15)  HR: 97 (10 Jul 2022 05:00) (90 - 125)  BP: 103/70 (10 Jul 2022 05:00) (102/70 - 132/69)  BP(mean): --  RR: 18 (10 Jul 2022 05:00) (18 - 18)  SpO2: 99% (10 Jul 2022 05:00) (97% - 100%)    Parameters below as of 10 Jul 2022 05:00  Patient On (Oxygen Delivery Method): room air        GENERAL EXAM:  Constitutional: awake and alert. NAD  HEENT: PERRLA, EOMI  Musculoskeletal: no joint swelling/tenderness, no abnormal movements  Skin: no rashes    NEUROLOGICAL EXAM:  MS: AAOX3, fluent, attends b/l;  CN: VFF, EOMI, PERRL  V1-3 intact, no facial asymmetry, t/p midline, SCM/trap intact.  Motor: Strength: 5/5 4x.   Tone: normal. Bulk: normal.  Plantar flex b/l.  Sensation: intact  4x.   Coordination: intact   Gait:  deferred      Labs:   cbc                      11.1   6.73  )-----------( 158      ( 10 Jul 2022 04:37 )             34.6     Chem        Radiology:  EEG Summary:    Abnormal EEG in the awake, drowsy and asleep states.  Generalized, mild, slowing     Impression/Clinical Correlate:    This is an abnormal EEG in awake, drowsy and sleep states.   1. Mild diffuse cerebral dysfunction   2. No epileptiform pattern or seizure observed       IMPRESSION:    No acute intracranial hemorrhage, brain edema, or mass effect.  No displaced calvarial fracture.   [id number]  Patient is a 37y old  Female who presents with a chief complaint of seizure (09 Jul 2022 11:35    Subjective: There were multiple atonia/trembling/twitching events noted yesterday.   Poor acknowledgment, poor insight, historian.  Denies seeing psychiatrist in past.  "I don't know" when asked how she feels    Objective:   MEDICATIONS  (STANDING):  apixaban 5 milliGRAM(s) Oral two times a day  ARIPiprazole 30 milliGRAM(s) Oral daily  fluticasone propionate 50 MICROgram(s)/spray Nasal Spray 1 Spray(s) Both Nostrils two times a day  levothyroxine 75 MICROGram(s) Oral daily  lurasidone 40 milliGRAM(s) Oral daily  pantoprazole    Tablet 40 milliGRAM(s) Oral before breakfast  polyethylene glycol 3350 17 Gram(s) Oral daily  QUEtiapine 200 milliGRAM(s) Oral two times a day  QUEtiapine 50 milliGRAM(s) Oral <User Schedule>  valproic acid 250 milliGRAM(s) Oral two times a day    MEDICATIONS  (PRN):  acetaminophen     Tablet .. 650 milliGRAM(s) Oral every 6 hours PRN Moderate Pain (4 - 6)  ALBUTerol    90 MICROgram(s) HFA Inhaler 2 Puff(s) Inhalation every 6 hours PRN Shortness of Breath and/or Wheezing  diphenhydrAMINE 25 milliGRAM(s) Oral at bedtime PRN Insomnia  haloperidol    Injectable 2 milliGRAM(s) IntraMuscular every 6 hours PRN Agitation  OLANZapine Injectable 2.5 milliGRAM(s) IntraMuscular once PRN agitation/seizure  senna 2 Tablet(s) Oral at bedtime PRN Constipation  valproate sodium  IVPB 1000 milliGRAM(s) IV Intermittent once PRN seizure    Vital Signs Last 24 Hrs  T(C): 36.6 (10 Jul 2022 05:00), Max: 37.1 (09 Jul 2022 20:15)  T(F): 97.9 (10 Jul 2022 05:00), Max: 98.8 (09 Jul 2022 20:15)  HR: 97 (10 Jul 2022 05:00) (90 - 125)  BP: 103/70 (10 Jul 2022 05:00) (102/70 - 132/69)  BP(mean): --  RR: 18 (10 Jul 2022 05:00) (18 - 18)  SpO2: 99% (10 Jul 2022 05:00) (97% - 100%)    Parameters below as of 10 Jul 2022 05:00  Patient On (Oxygen Delivery Method): room air        GENERAL EXAM:  Constitutional: awake and alert. NAD  HEENT: PERRLA, EOMI  Musculoskeletal: no joint swelling/tenderness, no abnormal movements  Skin: no rashes    NEUROLOGICAL EXAM:  MS: AAOX3, fluent, attends b/l;  CN: VFF, EOMI, PERRL  V1-3 intact, no facial asymmetry, t/p midline, SCM/trap intact.  Motor: Strength: 5/5 4x.   Tone: normal. Bulk: normal.  Plantar flex b/l.  Sensation: intact  4x.   Coordination: intact   Gait:  deferred      Labs:   cbc                      11.1   6.73  )-----------( 158      ( 10 Jul 2022 04:37 )             34.6     Chem        Radiology:  EEG Summary:    Abnormal EEG in the awake, drowsy and asleep states.  Generalized, mild, slowing     Impression/Clinical Correlate:    This is an abnormal EEG in awake, drowsy and sleep states.   1. Mild diffuse cerebral dysfunction   2. No epileptiform pattern or seizure observed       IMPRESSION:    No acute intracranial hemorrhage, brain edema, or mass effect.  No displaced calvarial fracture.

## 2022-07-10 NOTE — DISCHARGE NOTE PROVIDER - CARE PROVIDERS DIRECT ADDRESSES
[email or web address]

## 2022-07-10 NOTE — DISCHARGE NOTE PROVIDER - CARE PROVIDER_API CALL
Salena Miller)  EEGEpilepsy; Neurology  [address], [address]  [place] [address]  Phone: [phone]  Fax: [phone]  Follow Up Time: 1 month   Jose G Byers)  Neurology  [address], [address]  [place] [address]  Phone: [phone]  Fax: [phone]  Established Patient  Follow Up Time: Routine    Pj Sanches)  Neurology; Neurophysiology  [address], [address]  [place] [address]  Phone: [phone]  Fax: [phone]  Established Patient  Follow Up Time: Routine

## 2022-07-10 NOTE — DISCHARGE NOTE PROVIDER - NSDCMRMEDTOKEN_GEN_ALL_CORE_FT
albuterol 90 mcg/inh inhalation aerosol: 2 puff(s) inhaled every 6 hours, As needed, Shortness of Breath and/or Wheezing  ARIPiprazole 30 mg oral tablet: 1 tab(s) orally once a day  docusate sodium 100 mg oral capsule: 1 cap(s) orally once a day (at bedtime), As Needed  Eliquis 5 mg oral tablet: 1 tab(s) orally 2 times a day   fluticasone 50 mcg/inh nasal spray: 1 spray(s) nasal 2 times a day  levothyroxine 75 mcg (0.075 mg) oral tablet: 1 tab(s) orally once a day  lidocaine 4% topical film: Apply topically to affected area once a day  lurasidone 40 mg oral tablet: 1 tab(s) orally once a day at noon  melatonin 3 mg oral tablet: 1 tab(s) orally once a day (at bedtime)  Multiple Vitamins with Iron oral tablet: 1 tab(s) orally once a day  olopatadine 0.1% ophthalmic solution: 1 drop(s) to each affected eye every 6 hours, As Needed  pantoprazole 40 mg oral delayed release tablet: 1 tab(s) orally once a day (before a meal)  polyethylene glycol 3350 oral powder for reconstitution: 17 gram(s) orally once a day  QUEtiapine 200 mg oral tablet: 1 tab(s) orally 2 times a day at 8 am and 8pm  QUEtiapine 50 mg oral tablet: 1 tab(s) orally once a day at noon  tiZANidine 2 mg oral tablet: 2 tab(s) orally 2 times a day, As Needed  Tylenol 325 mg oral tablet: 2 tab(s) orally every 6 hours, As Needed  Vitamin D2 1.25 mg (50,000 intl units) oral capsule: 1 cap(s) orally once a week   albuterol 90 mcg/inh inhalation aerosol: 2 puff(s) inhaled every 6 hours, As needed, Shortness of Breath and/or Wheezing  ARIPiprazole 30 mg oral tablet: 1 tab(s) orally once a day  docusate sodium 100 mg oral capsule: 1 cap(s) orally once a day (at bedtime), As Needed  Eliquis 5 mg oral tablet: 1 tab(s) orally 2 times a day   fluticasone 50 mcg/inh nasal spray: 1 spray(s) nasal 2 times a day  levothyroxine 75 mcg (0.075 mg) oral tablet: 1 tab(s) orally once a day  lidocaine 4% topical film: Apply topically to affected area once a day  lurasidone 40 mg oral tablet: 1 tab(s) orally once a day at noon  melatonin 3 mg oral tablet: 1 tab(s) orally once a day (at bedtime)  Multiple Vitamins with Iron oral tablet: 1 tab(s) orally once a day  olopatadine 0.1% ophthalmic solution: 1 drop(s) to each affected eye every 6 hours, As Needed  pantoprazole 40 mg oral delayed release tablet: 1 tab(s) orally once a day (before a meal)  polyethylene glycol 3350 oral powder for reconstitution: 17 gram(s) orally once a day  QUEtiapine 200 mg oral tablet: 1 tab(s) orally 2 times a day at 8 am and 8pm  QUEtiapine 50 mg oral tablet: 1 tab(s) orally once a day at noon  tiZANidine 2 mg oral tablet: 2 tab(s) orally 2 times a day, As Needed  Trileptal 150 mg oral tablet: 1 tab(s) orally 2 times a day MDD:300mg  Tylenol 325 mg oral tablet: 2 tab(s) orally every 6 hours, As Needed  Vitamin D2 1.25 mg (50,000 intl units) oral capsule: 1 cap(s) orally once a week   albuterol 90 mcg/inh inhalation aerosol: 2 puff(s) inhaled every 6 hours, As needed, Shortness of Breath and/or Wheezing  Dispense one inhaler  ARIPiprazole 30 mg oral tablet: 1 tab(s) orally once a day  docusate sodium 100 mg oral capsule: 1 cap(s) orally once a day (at bedtime), As Needed for constipation  Eliquis 5 mg oral tablet: 1 tab(s) orally 2 times a day   fluticasone 50 mcg/inh nasal spray: 1 spray(s) nasal 2 times a day  levothyroxine 75 mcg (0.075 mg) oral tablet: 1 tab(s) orally once a day  lidocaine 4% topical film: Apply topically to affected area once a day. 12 hours on and 12 hours off  lurasidone 40 mg oral tablet: 1 tab(s) orally once a day at noon  melatonin 3 mg oral tablet: 1 tab(s) orally once a day (at bedtime)  Multiple Vitamins with Iron oral tablet: 1 tab(s) orally once a day  olopatadine 0.1% ophthalmic solution: 1 drop(s) to each affected eye every 6 hours, As Needed  pantoprazole 40 mg oral delayed release tablet: 1 tab(s) orally once a day (before a meal)  polyethylene glycol 3350 oral powder for reconstitution: 17 gram(s) orally once a day  QUEtiapine 200 mg oral tablet: 1 tab(s) orally 2 times a day at 8 am and 8pm  QUEtiapine 50 mg oral tablet: 1 tab(s) orally once a day at noon  Trileptal 150 mg oral tablet: 1 tab(s) orally 2 times a day MDD:300mg  Tylenol 325 mg oral tablet: 2 tab(s) orally every 6 hours, As Needed

## 2022-07-11 ENCOUNTER — APPOINTMENT (OUTPATIENT)
Dept: CARDIOLOGY | Facility: CLINIC | Age: 37
End: 2022-07-11

## 2022-07-11 ENCOUNTER — TRANSCRIPTION ENCOUNTER (OUTPATIENT)
Age: 37
End: 2022-07-11

## 2022-07-11 VITALS
RESPIRATION RATE: 19 BRPM | TEMPERATURE: 98 F | HEART RATE: 75 BPM | SYSTOLIC BLOOD PRESSURE: 116 MMHG | DIASTOLIC BLOOD PRESSURE: 81 MMHG | OXYGEN SATURATION: 99 %

## 2022-07-11 PROCEDURE — 93010 ELECTROCARDIOGRAM REPORT: CPT

## 2022-07-11 PROCEDURE — 94640 AIRWAY INHALATION TREATMENT: CPT

## 2022-07-11 PROCEDURE — 85027 COMPLETE CBC AUTOMATED: CPT

## 2022-07-11 PROCEDURE — 95715 VEEG EA 12-26HR INTMT MNTR: CPT

## 2022-07-11 PROCEDURE — 99238 HOSP IP/OBS DSCHRG MGMT 30/<: CPT | Mod: GC

## 2022-07-11 PROCEDURE — 36415 COLL VENOUS BLD VENIPUNCTURE: CPT

## 2022-07-11 PROCEDURE — 99222 1ST HOSP IP/OBS MODERATE 55: CPT

## 2022-07-11 PROCEDURE — 93005 ELECTROCARDIOGRAM TRACING: CPT

## 2022-07-11 PROCEDURE — 95700 EEG CONT REC W/VID EEG TECH: CPT

## 2022-07-11 RX ORDER — POLYETHYLENE GLYCOL 3350 17 G/17G
17 POWDER, FOR SOLUTION ORAL
Qty: 0 | Refills: 0 | DISCHARGE

## 2022-07-11 RX ORDER — QUETIAPINE FUMARATE 200 MG/1
1 TABLET, FILM COATED ORAL
Qty: 30 | Refills: 0
Start: 2022-07-11 | End: 2022-08-09

## 2022-07-11 RX ORDER — OLOPATADINE HYDROCHLORIDE 1 MG/ML
1 SOLUTION/ DROPS OPHTHALMIC
Qty: 1 | Refills: 0
Start: 2022-07-11 | End: 2022-08-09

## 2022-07-11 RX ORDER — QUETIAPINE FUMARATE 200 MG/1
1 TABLET, FILM COATED ORAL
Qty: 60 | Refills: 0
Start: 2022-07-11 | End: 2022-08-09

## 2022-07-11 RX ORDER — ACETAMINOPHEN 500 MG
2 TABLET ORAL
Qty: 30 | Refills: 0
Start: 2022-07-11 | End: 2022-08-09

## 2022-07-11 RX ORDER — FLUTICASONE PROPIONATE 50 MCG
1 SPRAY, SUSPENSION NASAL
Qty: 0 | Refills: 0 | DISCHARGE

## 2022-07-11 RX ORDER — OXCARBAZEPINE 300 MG/1
1 TABLET, FILM COATED ORAL
Qty: 60 | Refills: 0
Start: 2022-07-11 | End: 2022-08-09

## 2022-07-11 RX ORDER — FLUTICASONE PROPIONATE 50 MCG
1 SPRAY, SUSPENSION NASAL
Qty: 1 | Refills: 0
Start: 2022-07-11 | End: 2022-08-09

## 2022-07-11 RX ORDER — POLYETHYLENE GLYCOL 3350 17 G/17G
17 POWDER, FOR SOLUTION ORAL
Qty: 510 | Refills: 0
Start: 2022-07-11 | End: 2022-08-09

## 2022-07-11 RX ORDER — DIPHENHYDRAMINE HCL 50 MG
25 CAPSULE ORAL ONCE
Refills: 0 | Status: COMPLETED | OUTPATIENT
Start: 2022-07-11 | End: 2022-07-11

## 2022-07-11 RX ORDER — POLYETHYLENE GLYCOL 3350 17 G/17G
17 POWDER, FOR SOLUTION ORAL
Qty: 510 | Refills: 0 | DISCHARGE
Start: 2022-07-11 | End: 2022-08-09

## 2022-07-11 RX ORDER — ERGOCALCIFEROL 1.25 MG/1
1 CAPSULE ORAL
Qty: 0 | Refills: 0 | DISCHARGE

## 2022-07-11 RX ORDER — OLOPATADINE HYDROCHLORIDE 1 MG/ML
1 SOLUTION/ DROPS OPHTHALMIC
Qty: 0 | Refills: 0 | DISCHARGE

## 2022-07-11 RX ORDER — APIXABAN 2.5 MG/1
1 TABLET, FILM COATED ORAL
Qty: 60 | Refills: 0
Start: 2022-07-11 | End: 2022-08-09

## 2022-07-11 RX ORDER — ERGOCALCIFEROL 1.25 MG/1
1 CAPSULE ORAL
Qty: 4 | Refills: 0
Start: 2022-07-11 | End: 2022-08-09

## 2022-07-11 RX ORDER — LANOLIN ALCOHOL/MO/W.PET/CERES
1 CREAM (GRAM) TOPICAL
Qty: 30 | Refills: 0
Start: 2022-07-11 | End: 2022-08-09

## 2022-07-11 RX ORDER — LURASIDONE HYDROCHLORIDE 40 MG/1
1 TABLET ORAL
Qty: 30 | Refills: 0
Start: 2022-07-11 | End: 2022-08-09

## 2022-07-11 RX ORDER — ALBUTEROL 90 UG/1
2 AEROSOL, METERED ORAL
Qty: 1 | Refills: 0
Start: 2022-07-11 | End: 2022-08-09

## 2022-07-11 RX ORDER — BENZOYL PEROXIDE MICRONIZED 5.8 %
1 TOWELETTE (EA) TOPICAL
Qty: 0 | Refills: 0 | DISCHARGE

## 2022-07-11 RX ORDER — TIZANIDINE 4 MG/1
2 TABLET ORAL
Qty: 0 | Refills: 0 | DISCHARGE

## 2022-07-11 RX ORDER — DOCUSATE SODIUM 100 MG
1 CAPSULE ORAL
Qty: 0 | Refills: 0 | DISCHARGE

## 2022-07-11 RX ORDER — PANTOPRAZOLE SODIUM 20 MG/1
1 TABLET, DELAYED RELEASE ORAL
Qty: 30 | Refills: 0
Start: 2022-07-11 | End: 2022-08-09

## 2022-07-11 RX ORDER — DOCUSATE SODIUM 100 MG
1 CAPSULE ORAL
Qty: 30 | Refills: 0
Start: 2022-07-11 | End: 2022-08-09

## 2022-07-11 RX ORDER — ARIPIPRAZOLE 15 MG/1
1 TABLET ORAL
Qty: 30 | Refills: 0
Start: 2022-07-11 | End: 2022-08-09

## 2022-07-11 RX ORDER — ACETAMINOPHEN 500 MG
2 TABLET ORAL
Qty: 0 | Refills: 0 | DISCHARGE

## 2022-07-11 RX ORDER — LEVOTHYROXINE SODIUM 125 MCG
1 TABLET ORAL
Qty: 30 | Refills: 0
Start: 2022-07-11 | End: 2022-08-09

## 2022-07-11 RX ORDER — BENZOYL PEROXIDE MICRONIZED 5.8 %
1 TOWELETTE (EA) TOPICAL
Qty: 30 | Refills: 0
Start: 2022-07-11 | End: 2022-08-09

## 2022-07-11 RX ORDER — LIDOCAINE 4 G/100G
1 CREAM TOPICAL
Qty: 30 | Refills: 0
Start: 2022-07-11 | End: 2022-08-09

## 2022-07-11 RX ADMIN — QUETIAPINE FUMARATE 50 MILLIGRAM(S): 200 TABLET, FILM COATED ORAL at 12:05

## 2022-07-11 RX ADMIN — ARIPIPRAZOLE 30 MILLIGRAM(S): 15 TABLET ORAL at 12:05

## 2022-07-11 RX ADMIN — Medication 1 SPRAY(S): at 17:39

## 2022-07-11 RX ADMIN — APIXABAN 5 MILLIGRAM(S): 2.5 TABLET, FILM COATED ORAL at 08:18

## 2022-07-11 RX ADMIN — APIXABAN 5 MILLIGRAM(S): 2.5 TABLET, FILM COATED ORAL at 17:39

## 2022-07-11 RX ADMIN — Medication 75 MICROGRAM(S): at 08:18

## 2022-07-11 RX ADMIN — QUETIAPINE FUMARATE 200 MILLIGRAM(S): 200 TABLET, FILM COATED ORAL at 08:18

## 2022-07-11 RX ADMIN — PANTOPRAZOLE SODIUM 40 MILLIGRAM(S): 20 TABLET, DELAYED RELEASE ORAL at 08:18

## 2022-07-11 RX ADMIN — LURASIDONE HYDROCHLORIDE 40 MILLIGRAM(S): 40 TABLET ORAL at 12:05

## 2022-07-11 RX ADMIN — Medication 25 MILLIGRAM(S): at 19:23

## 2022-07-11 RX ADMIN — Medication 0.5 MILLIGRAM(S): at 17:38

## 2022-07-11 RX ADMIN — Medication 1 SPRAY(S): at 08:18

## 2022-07-11 NOTE — PROGRESS NOTE ADULT - SUBJECTIVE AND OBJECTIVE BOX
MRN-[id number]  Patient is a 37y old  Female who presents with a chief complaint of seizure (10 Jul 2022 15:31)    Multiple episodes overnight. See chart note. Per Dr. English, eeg negative for seizure acitvity. Received Ativan 1 and zyprexa 2.5 im for behavior. Sleepy during 1 am and 4 am assessment. At 6 am refusing AM labs and moving extremities antigravity. Counseled on her episodes being unlikely of seizure etiology.     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 Patient was discharged and seated on a stretcher awaiting a ride when she endorsed dizziness to staff, states that she felt like she was going to have a seizure, and was noted to start having "tonic-clonic seizure-like activity with truncal spasms, pupils 4-5 mm non-reactive, involuntary nystagmus and lack of pupillary reflex / dolls eyes," per ED examination. Patient reportedly without responsiveness during this event, and noted to be tachypneic to the 40s w/ shallow respirations. Per chart review patient was given lorazepam 2mg IM a total of 4 times in the ED or refractory seizures. Patient was also loaded with 3,000mg of levetiracetam.    Per chart review, patient seen by Neurologist Dr. Sanches in 6/15/22 for abnormal movements. At that time, that particular episode was consistent with her pseudoseizures. Patient follows with Dr. Byers in outpatient setting. Per outpatient note, patient has "?aura vs anxiety characterized as abdominal fluttering." Patient was on levetiracetam and switched to VPA at [facility] due to concern for abdominal discomfort. Unclear whether there was any complaint of mood change (i.e., irritability) on levetiracetam.     Patient was followed by [facility] neurology and during hospital stay cases was discussed with Dr. Miller. Patient transferred to [facility] EMU for further management.            (08 Jul 2022 14:38)      PAST MEDICAL & SURGICAL HISTORY:  Asthma      Seizure      Factor V Leiden      Bipolar disorder      Endometriosis      History of DVT in adulthood  RLE on eliquis      Hypothyroid      Seasonal allergies      Insomnia      GERD (gastroesophageal reflux disease)      No significant past surgical history        FAMILY HISTORY:  Family history of DVT (Mother)      Social Hx:  Nonsmoker, no drug or alcohol use    Home Medications:  albuterol 90 mcg/inh inhalation aerosol: 2 puff(s) inhaled every 6 hours, As needed, Shortness of Breath and/or Wheezing (05 Jul 2022 17:40)  ARIPiprazole 30 mg oral tablet: 1 tab(s) orally once a day (05 Jul 2022 17:40)  docusate sodium 100 mg oral capsule: 1 cap(s) orally once a day (at bedtime), As Needed (05 Jul 2022 17:40)  fluticasone 50 mcg/inh nasal spray: 1 spray(s) nasal 2 times a day (05 Jul 2022 17:40)  levothyroxine 75 mcg (0.075 mg) oral tablet: 1 tab(s) orally once a day (05 Jul 2022 17:40)  lidocaine 4% topical film: Apply topically to affected area once a day (08 Jul 2022 12:11)  lurasidone 40 mg oral tablet: 1 tab(s) orally once a day at noon (08 Jul 2022 13:46)  melatonin 3 mg oral tablet: 1 tab(s) orally once a day (at bedtime) (05 Jul 2022 17:40)  Multiple Vitamins with Iron oral tablet: 1 tab(s) orally once a day (05 Jul 2022 17:40)  olopatadine 0.1% ophthalmic solution: 1 drop(s) to each affected eye every 6 hours, As Needed (05 Jul 2022 17:40)  pantoprazole 40 mg oral delayed release tablet: 1 tab(s) orally once a day (before a meal) (05 Jul 2022 17:40)  polyethylene glycol 3350 oral powder for reconstitution: 17 gram(s) orally once a day (05 Jul 2022 17:40)  QUEtiapine 200 mg oral tablet: 1 tab(s) orally 2 times a day at 8 am and 8pm (08 Jul 2022 13:46)  QUEtiapine 50 mg oral tablet: 1 tab(s) orally once a day at noon (08 Jul 2022 13:46)  tiZANidine 2 mg oral tablet: 2 tab(s) orally 2 times a day, As Needed (05 Jul 2022 17:40)  Tylenol 325 mg oral tablet: 2 tab(s) orally every 6 hours, As Needed (05 Jul 2022 17:40)  Vitamin D2 1.25 mg (50,000 intl units) oral capsule: 1 cap(s) orally once a week (05 Jul 2022 17:40)    MEDICATIONS  (STANDING):  apixaban 5 milliGRAM(s) Oral two times a day  ARIPiprazole 30 milliGRAM(s) Oral daily  fluticasone propionate 50 MICROgram(s)/spray Nasal Spray 1 Spray(s) Both Nostrils two times a day  levothyroxine 75 MICROGram(s) Oral daily  lurasidone 40 milliGRAM(s) Oral daily  pantoprazole    Tablet 40 milliGRAM(s) Oral before breakfast  polyethylene glycol 3350 17 Gram(s) Oral daily  QUEtiapine 200 milliGRAM(s) Oral two times a day  QUEtiapine 50 milliGRAM(s) Oral <User Schedule>    MEDICATIONS  (PRN):  acetaminophen     Tablet .. 650 milliGRAM(s) Oral every 6 hours PRN Moderate Pain (4 - 6)  ALBUTerol    90 MICROgram(s) HFA Inhaler 2 Puff(s) Inhalation every 6 hours PRN Shortness of Breath and/or Wheezing  diphenhydrAMINE 25 milliGRAM(s) Oral at bedtime PRN Insomnia  haloperidol    Injectable 2 milliGRAM(s) IntraMuscular every 6 hours PRN Agitation  senna 2 Tablet(s) Oral at bedtime PRN Constipation    Allergies  latex (Blisters)  penicillin (Hives)  penicillin (Other)  pineapple (Unknown)  Toradol (Rash)  Toradol (Unknown)  Zofran (Hives)  Zofran (Unknown)    ROS: Pertinent positives in HPI, all other ROS were reviewed and are negative.      Vital Signs Last 24 Hrs  T(C): 36.7 (11 Jul 2022 10:31), Max: 37 (10 Jul 2022 18:45)  T(F): 98.1 (11 Jul 2022 10:31), Max: 98.6 (10 Jul 2022 18:45)  HR: 102 (11 Jul 2022 10:31) (97 - 119)  BP: 108/78 (11 Jul 2022 10:31) (100/68 - 136/93)  BP(mean): --  RR: 18 (11 Jul 2022 10:31) (18 - 24)  SpO2: 100% (11 Jul 2022 10:31) (96% - 100%)    Parameters below as of 11 Jul 2022 10:31  Patient On (Oxygen Delivery Method): room air        GENERAL EXAM:  Constitutional: awake and alert. NAD  HEENT: PERRL, EOMI  Neck: Supple  Extremities: no edema, no cyanosis  Skin: no rashes    NEUROLOGICAL EXAM:  MS: AAOX3, fluent, attends b/l  CN: VFF, EOMI, PERRL, V1-3 intact, no facial asymmetry, t/p midline, SCM/trap intact.  Motor: Strength: 5/5 4x. Tone: normal. Bulk: normal. DTR 2+ symm.  Plantar flex b/l. Sensation: intact to LT 4x.      Labs:   cbc                      11.1   6.73  )-----------( 158      ( 10 Jul 2022 04:37 )             34.6     Radiology:  -CT Head: No acute intracranial hemorrhage, brain edema, or mass effect.  No displaced calvarial fracture.  -EEG: No epileptiform events    MRN-[id number]  Patient is a 37y old  Female who presents with a chief complaint of seizure (10 Jul 2022 15:31)    Subjective: Multiple episodes overnight. See chart note. Per Dr. English, eeg negative for seizure acitvity.     objective:   Home Medications:  albuterol 90 mcg/inh inhalation aerosol: 2 puff(s) inhaled every 6 hours, As needed, Shortness of Breath and/or Wheezing (05 Jul 2022 17:40)  ARIPiprazole 30 mg oral tablet: 1 tab(s) orally once a day (05 Jul 2022 17:40)  docusate sodium 100 mg oral capsule: 1 cap(s) orally once a day (at bedtime), As Needed (05 Jul 2022 17:40)  fluticasone 50 mcg/inh nasal spray: 1 spray(s) nasal 2 times a day (05 Jul 2022 17:40)  levothyroxine 75 mcg (0.075 mg) oral tablet: 1 tab(s) orally once a day (05 Jul 2022 17:40)  lidocaine 4% topical film: Apply topically to affected area once a day (08 Jul 2022 12:11)  lurasidone 40 mg oral tablet: 1 tab(s) orally once a day at noon (08 Jul 2022 13:46)  melatonin 3 mg oral tablet: 1 tab(s) orally once a day (at bedtime) (05 Jul 2022 17:40)  Multiple Vitamins with Iron oral tablet: 1 tab(s) orally once a day (05 Jul 2022 17:40)  olopatadine 0.1% ophthalmic solution: 1 drop(s) to each affected eye every 6 hours, As Needed (05 Jul 2022 17:40)  pantoprazole 40 mg oral delayed release tablet: 1 tab(s) orally once a day (before a meal) (05 Jul 2022 17:40)  polyethylene glycol 3350 oral powder for reconstitution: 17 gram(s) orally once a day (05 Jul 2022 17:40)  QUEtiapine 200 mg oral tablet: 1 tab(s) orally 2 times a day at 8 am and 8pm (08 Jul 2022 13:46)  QUEtiapine 50 mg oral tablet: 1 tab(s) orally once a day at noon (08 Jul 2022 13:46)  tiZANidine 2 mg oral tablet: 2 tab(s) orally 2 times a day, As Needed (05 Jul 2022 17:40)  Tylenol 325 mg oral tablet: 2 tab(s) orally every 6 hours, As Needed (05 Jul 2022 17:40)  Vitamin D2 1.25 mg (50,000 intl units) oral capsule: 1 cap(s) orally once a week (05 Jul 2022 17:40)    MEDICATIONS  (STANDING):  apixaban 5 milliGRAM(s) Oral two times a day  ARIPiprazole 30 milliGRAM(s) Oral daily  fluticasone propionate 50 MICROgram(s)/spray Nasal Spray 1 Spray(s) Both Nostrils two times a day  levothyroxine 75 MICROGram(s) Oral daily  lurasidone 40 milliGRAM(s) Oral daily  pantoprazole    Tablet 40 milliGRAM(s) Oral before breakfast  polyethylene glycol 3350 17 Gram(s) Oral daily  QUEtiapine 200 milliGRAM(s) Oral two times a day  QUEtiapine 50 milliGRAM(s) Oral <User Schedule>    MEDICATIONS  (PRN):  acetaminophen     Tablet .. 650 milliGRAM(s) Oral every 6 hours PRN Moderate Pain (4 - 6)  ALBUTerol    90 MICROgram(s) HFA Inhaler 2 Puff(s) Inhalation every 6 hours PRN Shortness of Breath and/or Wheezing  diphenhydrAMINE 25 milliGRAM(s) Oral at bedtime PRN Insomnia  haloperidol    Injectable 2 milliGRAM(s) IntraMuscular every 6 hours PRN Agitation  senna 2 Tablet(s) Oral at bedtime PRN Constipation    Vital Signs Last 24 Hrs  T(C): 36.7 (11 Jul 2022 10:31), Max: 37 (10 Jul 2022 18:45)  T(F): 98.1 (11 Jul 2022 10:31), Max: 98.6 (10 Jul 2022 18:45)  HR: 102 (11 Jul 2022 10:31) (97 - 119)  BP: 108/78 (11 Jul 2022 10:31) (100/68 - 136/93)  BP(mean): --  RR: 18 (11 Jul 2022 10:31) (18 - 24)  SpO2: 100% (11 Jul 2022 10:31) (96% - 100%)    Parameters below as of 11 Jul 2022 10:31  Patient On (Oxygen Delivery Method): room air        GENERAL EXAM:  Constitutional: awake and alert. NAD  HEENT: PERRL, EOMI  Neck: Supple  Extremities: no edema, no cyanosis  Skin: no rashes    NEUROLOGICAL EXAM:  MS: AAOX3, fluent, attends b/l  CN: VFF, EOMI, PERRL, V1-3 intact, no facial asymmetry, t/p midline, SCM/trap intact.  Motor: Strength: 5/5 4x. Tone: normal. Bulk: normal. DTR 2+ symm.  Plantar flex b/l. Sensation: intact to LT 4x.      Labs:   cbc                      11.1   6.73  )-----------( 158      ( 10 Jul 2022 04:37 )             34.6     Radiology:  -CT Head: No acute intracranial hemorrhage, brain edema, or mass effect.  No displaced calvarial fracture.  -EEG: No epileptiform events

## 2022-07-11 NOTE — BH CONSULTATION LIAISON ASSESSMENT NOTE - NSBHATTESTCOMMENTATTENDFT_PSY_A_CORE
Chart reviewed, including previous C/L psychiatry notes, pt. seen/evaluated with Lori Paul NP, I agree with above assessment/plan. Patient calm, seems somewhat drowsy/lethargic, denies SI and HI, denies AVH. Interview rather limited at this time. Plan as above, will follow Patient is a 37 year old female domiciled at [facility] w/ PMHx obesity, factor V Leiden deficiency a/w DVT/PE, seizures a/w pseudoseizures, hypothyroidism, intellectual disability and PPHx of bipolar disorder vs schizoaffective disorder who was transferred from [facility]  from [facility] adult home for seizure- like activity. C/L Psychiatry was consulted for medication management due to depakote D/C.    Patient was seen and examined at bedside. Interview was limited as patient was lethargic. Patient denies current active SI, HI, or AVH. Patient denies depressive symptoms, manic symptoms or symptoms of anxiety. Patient describes her mood as "tired" and reports that she feels "auras" intermittently. Patient describes auras as "butterflies throughout my whole body." Patient denies changes in mood prior to onset of aura. can resume depakote, cont current meds, f/u with outpt psychiatrist

## 2022-07-11 NOTE — BH CONSULTATION LIAISON PROGRESS NOTE - NSBHCHARTREVIEWVS_PSY_A_CORE FT
Vital Signs Last 24 Hrs  T(C): 36.4 (11 Jul 2022 12:25), Max: 37 (10 Jul 2022 18:45)  T(F): 97.6 (11 Jul 2022 12:25), Max: 98.6 (10 Jul 2022 18:45)  HR: 99 (11 Jul 2022 12:25) (97 - 119)  BP: 104/68 (11 Jul 2022 12:25) (100/68 - 136/93)  BP(mean): --  RR: 20 (11 Jul 2022 12:25) (18 - 24)  SpO2: 96% (11 Jul 2022 12:25) (96% - 100%)    Parameters below as of 11 Jul 2022 12:25  Patient On (Oxygen Delivery Method): room air

## 2022-07-11 NOTE — BH CONSULTATION LIAISON ASSESSMENT NOTE - NSBHCHARTREVIEWVS_PSY_A_CORE FT
Vital Signs Last 24 Hrs  T(C): 36.7 (11 Jul 2022 05:45), Max: 37 (10 Jul 2022 18:45)  T(F): 98.1 (11 Jul 2022 05:45), Max: 98.6 (10 Jul 2022 18:45)  HR: 97 (11 Jul 2022 05:45) (97 - 139)  BP: 110/75 (11 Jul 2022 05:45) (100/68 - 136/93)  BP(mean): --  RR: 18 (11 Jul 2022 05:45) (18 - 28)  SpO2: 100% (11 Jul 2022 05:45) (96% - 100%)    Parameters below as of 11 Jul 2022 05:45  Patient On (Oxygen Delivery Method): room air

## 2022-07-11 NOTE — PROGRESS NOTE ADULT - ATTENDING COMMENTS
Agree with housestaff exam, assessment, and plan unless otherwise stated. Patient evaluated and examined in my presence, case discussed with treatment team. As per protocol, I discussed available results of testing and plan of care with the patient, who agree to the plan, including anticipated benefits of the admission, studies, procedure. Note above reviewed, further edited for accuracy, and cosigned.  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
Agree with housestaff exam, assessment, and plan unless otherwise stated. Patient evaluated and examined in my presence, case discussed with treatment team. As per protocol, I discussed available results of testing and plan of care with the patient, who agree to the plan, including anticipated benefits of the admission, studies, procedure. Note above reviewed, further edited for accuracy, and cosigned.  Face time for evaluation, education, and counseling was >50% of time spent on unit x35min    Psychogenic appearing unresponsiveness, irregular migratory twitching behavior after rounds  EEG with no assoc changes  supportive care  taper off depakote to 250mg bid now oral  dispo planning after weekend if stable and no true epileptic events
non epileptic events  will start trileptal 150 bid for mood

## 2022-07-11 NOTE — PROVIDER CONTACT NOTE (OTHER) - ACTION/TREATMENT ORDERED:
Zyprexa 2.5mg IM PRN orderx1 given Zyprexa 2.5mg IM PRN orderx1 given. O2 2li t via NC on flow. SPo2 99%

## 2022-07-11 NOTE — PROVIDER CONTACT NOTE (OTHER) - BACKGROUND
transfer from [facility], history of BP1, transferred for event capture
(cont'd) wishes she could be with her grandma who has already passed away. Psych evaluated this morning but pt was not feeling this way at the time. States it started after talking about meds.
Seizure
destiney from [facility], lives a group home with history of seizures and BP1
/ PMHx obesity, factor V Leiden deficiency a/w DVT/PE (apixaban 5mg BID), seizures a/w ?PNES, bipolar disorder, endometriosis, hypothyroid
Seizure
Seizure

## 2022-07-11 NOTE — BH CONSULTATION LIAISON PROGRESS NOTE - RISK ASSESSMENT
At this time patient is not at imminent risk of harm to self or others therefore not appropriate for inpatient psychiatric treatment. At this time, patient is not at high imminent risk of harm to self or others therefore not appropriate for inpatient psychiatric treatment. Risk factors include current psychiatric diagnoses and history of aggression towards others. Protective factors include residential stability and positive therapeutic relationships.

## 2022-07-11 NOTE — PROVIDER CONTACT NOTE (OTHER) - NAME OF MD/NP/PA/DO NOTIFIED:
PA Corey
ANGIE Paez
Vignesh PINA
Vignesh PINA
ANGIE Sarmiento
ANGIE Sarmiento made aware.
Dr. Cardona
Vignesh PINA
ANGIE Sarmiento
Lyly Sarmiento

## 2022-07-11 NOTE — PROVIDER CONTACT NOTE (OTHER) - RECOMMENDATIONS
PA. Notified
Notified PA
EKG
IM ativan.
come evaluate patient at bedside for if ativan is needed as none is ordered for the patient.
Notified PA
Psych reevaluation, Neuro team reconsulting psych prior to discharge
midline for pt in duration of stay.

## 2022-07-11 NOTE — BH CONSULTATION LIAISON PROGRESS NOTE - NSBHATTESTCOMMENTATTENDFT_PSY_A_CORE
Patient is a 37 year old female domiciled to [facility] w/ PMHx obesity, factor V Leiden deficiency a/w DVT/PE, seizures a/w pseudoseizures, hypothyroidism, intellectual disability and PPHx of bipolar disorder vs schizoaffective disorder who was transferred from [facility] for seizure- like activity. C/L Psychiatry was re-consulted for suicidal ideations.    Patient denies current actives SI/HI and AVH. Denies depressive or manic symptoms and symptoms of anxiety. Based on risk, patient is appropriate for community care. Patient is advised to follow-up with outpatient services and supportive therapy. cont current meds

## 2022-07-11 NOTE — BH CONSULTATION LIAISON ASSESSMENT NOTE - NSBHCONSULTFOLLOWAFTERCARE_PSY_A_CORE FT
Follow-up with outpatient psychiatrist or [facility] clinic at [phone]

## 2022-07-11 NOTE — EEG REPORT - HISTORY
paroxysmal events of unclear etiology

## 2022-07-11 NOTE — BH CONSULTATION LIAISON ASSESSMENT NOTE - NSBHATTESTBILLINGAW_PSY_A_CORE
68490-Ldlofarsyib diagnostic evaluation with medical services 99222-Initial hospital care - moderate complexity

## 2022-07-11 NOTE — BH CONSULTATION LIAISON ASSESSMENT NOTE - HPI (INCLUDE ILLNESS QUALITY, SEVERITY, DURATION, TIMING, CONTEXT, MODIFYING FACTORS, ASSOCIATED SIGNS AND SYMPTOMS)
Patient is a 37 year old female domiciled at [facility] w/ PMHx obesity, factor V Leiden deficiency a/w DVT/PE, seizures a/w pseudoseizures, b hypothyroidism and PPHx of bipolar disorder who was transferred from [facility]  from [facility] adult home for seizure- like activity. C/L Psychiatry was consulted for medication management due to D/C of depakote.    Patient was seen and examined at bedside. Patient is connected to EEG. Interview was limited as patient was lethargic. Patient denies SI, HI, or AVH. Patient denies depressive symptoms, manic symptoms or symptoms of anxiety. Patient describes her mood as "tired". When asked how psychiatry can be of assistance to her, patient states, "what's psychiatry". Patient did not meaningfully engage in interview further.    Spoke to Neurology. As per neuro, patient's EEG report does not show evidence of active seizure activity, despite multiple episodes of witnessed seizure -like activity, thus home dose of depakote was D/C'd.    Contacted legal guardian, Maria Elena Brown [phone] and left VM to discuss patient's current medications, awaiting response.      Patient is a 37 year old female domiciled at [facility] w/ PMHx obesity, factor V Leiden deficiency a/w DVT/PE, seizures a/w pseudoseizures, hypothyroidism and PPHx of intellectual disability, bipolar disorder, schizoaffective disorder who was transferred from [facility]  from [facility] adult home for seizure- like activity. C/L Psychiatry was consulted for medication management.    Patient was seen and examined at bedside. Interview was limited as patient was lethargic. Patient denies current active SI, HI, or AVH. Patient denies depressive symptoms, manic symptoms or symptoms of anxiety. Patient describes her mood as "tired" and reports that she feels "auras" intermittently. Patient describes auras as "butterflies throughout my whole body." Patient denies changes in mood prior to onset of auras. When asked how psychiatry can be of assistance to her, patient states, "what's psychiatry".    Patient endorses was hospitalized for previous SI attempt via overdose in 2017. Denies current or previous substance abuse. Denies additional psychiatric hx during interview.     Spoke to Neurology. As per neuro, patient's EEG report does not show evidence of active seizure activity despite multiple episodes of witnessed seizure -like activity, thus depakote was D/C'd.    Contacted legal guardian, Maria Elena Brown [phone] and left VM to discuss patient's current medications, awaiting response.    Patient is a 37 year old female domiciled at [facility] w/ PMHx obesity, factor V Leiden deficiency a/w DVT/PE, seizures a/w pseudoseizures, hypothyroidism, intellectual disability and PPHx of bipolar disorder vs schizoaffective disorder who was transferred from [facility]  from [facility] adult home for seizure- like activity. C/L Psychiatry was consulted for medication management due to depakote D/C.    Patient was seen and examined at bedside. Interview was limited as patient was lethargic. Patient denies current active SI, HI, or AVH. Patient denies depressive symptoms, manic symptoms or symptoms of anxiety. Patient describes her mood as "tired" and reports that she feels "auras" intermittently. Patient describes auras as "butterflies throughout my whole body." Patient denies changes in mood prior to onset of auras. When asked how psychiatry can be of assistance to her, patient states, "what's psychiatry".    During interview, patient endorses was hospitalized for previous SI attempt via overdose in 2017. Denies current or previous substance abuse. Denies additional psychiatric hx.    Spoke to Neurology. As per neuro, patient's EEG report does not show evidence of active seizure activity despite multiple episodes of witnessed seizure -like activity, thus depakote was D/C'd.    Contacted legal guardian, Maria Elena Brown [phone] and left VM to discuss patient's current medications, awaiting response.

## 2022-07-11 NOTE — PROVIDER CONTACT NOTE (OTHER) - DATE AND TIME:
10-Jul-2022 22:25
10-Jul-2022 10:40
10-Jul-2022 11:00
10-Jul-2022 10:05
10-Jul-2022 22:55
11-Jul-2022 04:00
11-Jul-2022 05:20
11-Jul-2022 06:15
11-Jul-2022 13:00
10-Jul-2022 22:05
11-Jul-2022 17:35
11-Jul-2022 19:20

## 2022-07-11 NOTE — BH CONSULTATION LIAISON ASSESSMENT NOTE - NSBHCHARTREVIEWLAB_PSY_A_CORE FT
11.7   5.82  )-----------( 179      ( 05 Jul 2022 11:20 )             37.8   07-05    142  |  105  |  13  ----------------------------<  121<H>  4.4   |  23  |  0.83    Ca    9.9      05 Jul 2022 11:20    TPro  8.9<H>  /  Alb  4.7  /  TBili  0.2  /  DBili  x   /  AST  28  /  ALT  32  /  AlkPhos  52  07-05     .  LABS:                         11.1   6.73  )-----------( 158      ( 10 Jul 2022 04:37 )             34.6                     RADIOLOGY, EKG & ADDITIONAL TESTS: Reviewed.

## 2022-07-11 NOTE — BH CONSULTATION LIAISON ASSESSMENT NOTE - NSBHCHARTREVIEWINVESTIGATE_PSY_A_CORE FT
< from: 12 Lead ECG (06.20.22 @ 13:43) >      Ventricular Rate 85 BPM    Atrial Rate 85 BPM    P-R Interval 166 ms    QRS Duration 82 ms    Q-T Interval 376 ms    QTC Calculation(Bazett) 447 ms    P Axis 44 degrees    R Axis 52 degrees    T Axis 67 degrees    Diagnosis Line Normal sinus rhythm  Normal ECG    < end of copied text >     Ventricular Rate 85 BPM    Atrial Rate 85 BPM    P-R Interval 166 ms    QRS Duration 82 ms    Q-T Interval 376 ms    QTC Calculation(Bazett) 447 ms    P Axis 44 degrees    R Axis 52 degrees    T Axis 67 degrees    Diagnosis Line Normal sinus rhythm  Normal ECG

## 2022-07-11 NOTE — DISCHARGE NOTE NURSING/CASE MANAGEMENT/SOCIAL WORK - PATIENT PORTAL LINK FT
You can access the FollowMyHealth Patient Portal offered by [facility] by registering at the following website: [email or web address]. By joining [employer]’s FollowMyHealth portal, you will also be able to view your health information using other applications (apps) compatible with our system.

## 2022-07-11 NOTE — PROVIDER CONTACT NOTE (OTHER) - ACTION/TREATMENT ORDERED:
Staff remained with patient until psych reevaluated. Pending updates on plan. When psych reevaluated pt started she was no longer feeling this way and has no plans to hurt herself or others.

## 2022-07-11 NOTE — PROVIDER CONTACT NOTE (OTHER) - ACTION/TREATMENT ORDERED:
EKG ordered and reviewed by JORGE L Nicole, pt cleared for discharge. Give one time dose of PO lorazepam.

## 2022-07-11 NOTE — BH CONSULTATION LIAISON PROGRESS NOTE - NSBHASSESSMENTFT_PSY_ALL_CORE
Patient is a 37 year old female [facility] w/ PMHx obesity, factor V Leiden deficiency a/w DVT/PE, seizures a/w pseudoseizures, hypothyroidism, intellectual disability and PPHx of bipolar disorder vs schizoaffective disorder who was transferred from [facility] for seizure- like activity. C/L Psychiatry was re-consulted for suicidal ideations.    Patient denies current actives SI/HI and AVH. Denies depressive or manic symptoms and symptoms of anxiety. Based on risk, patient is appropriate for community care. Patient is advised to follow-up with outpatient services and supportive therapy.

## 2022-07-11 NOTE — BH CONSULTATION LIAISON ASSESSMENT NOTE - DIFFERENTIAL
bipolar disorder  intellectual disability  impulse control disorder Bipolar disorder  Intellectual disability  Impulse control disorder

## 2022-07-11 NOTE — BH CONSULTATION LIAISON ASSESSMENT NOTE - OTHER
domiciled in [facility]  Lives at [facility]  lethargic  Lethargic

## 2022-07-11 NOTE — BH CONSULTATION LIAISON PROGRESS NOTE - NSBHFUPINTERVALHXFT_PSY_A_CORE
Patient is a 37 year old female domiciled at [facility] w/ PMHx obesity, factor V Leiden deficiency a/w DVT/PE, seizures a/w pseudoseizures, hypothyroidism, intellectual disability and PPHx of bipolar disorder vs schizoaffective disorder who was transferred from [facility]  from [facility] adult home for seizure- like activity. C/L Psychiatry was re-consulted for suicidal ideations.     Recommendations from Neurology appreciated.     Patient was seen and examined at [facility]. Patient is calm and cooperative at time of interview. Patient is AAOx4. As per patient, Neuro team informed patient depokate would be discontinued because patient's EEG did not show seizure activity. Patient became concerned because she was would not have medication for seizure management, stating "they made me upset because they said I was not having epileptic seizures and they were taking me off the depakote". Patient then reports that she was told by SW, seizure medication was not being discontinued and she would be discharged on an alternative neuroepileptic medication. Patient believed she had been "lied to" about her seizure medication which prompted her to verbalize SI to staff. At time of interview, patient denies current, active or passive SI, HI, or AVH.     Patient states that she no longer wishes to be on depakote and states that she is amenable to Trileptal, as recommended by Neurology, stating that she noticed her mood had improved while previously on this medication.     Collected additional collateral from outpatient psychiatrist, Dr. Weiner, [phone] (via Barbara Flores- medication coordinator). Patient prescribed depakote 250mg BID for mood stabilization and for impulsivity not specifically for seizure prophylaxis.

## 2022-07-11 NOTE — BH CONSULTATION LIAISON ASSESSMENT NOTE - RISK ASSESSMENT
Risk Factors: acute medical condition, intellectual disability, hx bipolar d/o, schizoaffective d/o, hx impulsivity, hx multiple psy inpatient admissions, hx sa, aggression towards others  Protective Factors: residential stability, supportive family, positive therapeutic relationship, denies si/sa/sib/hi, denies ah/vh, medication compliant, denies access to firearms  At this time, patient is not at high imminent risk of harm to self or others therefore not appropriate for inpatient psychiatric treatment. Risk factors include current psychiatric diagnoses and history of aggression towards others. Protective factors include residential stability and positive therapeutic relationship. At this time, patient is not at high imminent risk of harm to self or others therefore not appropriate for inpatient psychiatric treatment. Risk factors include current psychiatric diagnoses and history of aggression towards others. Protective factors include residential stability and positive therapeutic relationships.

## 2022-07-11 NOTE — PROVIDER CONTACT NOTE (OTHER) - REASON
Pt continue to have active event; not responding to RN or PCA
Pt having active event while IV team tried place IV
Again pt had twitching whole body and head turned to right side
C/o rash and itching on her face after head bath
Pt is little more responsive than before. Pt refused to do the Blood work by pulling her hand saying no
Pt is very sleepy Not responding to commands
Pt no longer has IV ACCESS
Pt reports feeling suicidal at times with no plan
Still pt is very sleepy. Not following the commands
Pt turned her head to right side and whole body twitching and trembling, O2 sat came down to 70%
pt c/o chest pain
Pt became unresponsive

## 2022-07-11 NOTE — PROGRESS NOTE ADULT - ASSESSMENT
Patient is a right handed 37 year old female w/ PMHx obesity, factor V Leiden deficiency a/w DVT/PE (apixaban 5mg BID), bipolar disorder, endometriosis, hypothyroid, recently discharged on 7/2/22 after a visit for shoulder abscess and had drainage by IR, who then presented again to [facility] ED7/4/22 w/ complaint of lower back pain worsening over the prior day w/ MRI consistent w/ chronic degenerative disease during without acutely actionable findings. Patient was discharged and seated on a stretcher awaiting a ride when she endorsed dizziness to staff, started to have a witnessed seizure-like activity. Per chart review patient received lorazepam 2mg IM a total of 4 times & 3,000mg of levetiracetam in the ED for refractory seizures Patient transferred to [facility] EMU for event capture. Patient is tachycardic when having episodes, but pulse is wnl at all other times.       Impression  Electrographic confirmed Nonepileptic seizures/functional neurological disorder    Recommendations  VEEG  discontinued VPA 250mg BID PO, start trileptal 100 mg bid  Seroquel 200/50/200 qday  rescue medications: zyprexa 2.5mg PRN, depakote 1g (second line), haldol 2mg IM PRN for agitation  Neuro checks Q4h  continue home medications  seizure precautions  psychiatry following, recs appreciated\  Will be discharged home on 7/11 or 7/12 pending pysch recommendations.     Pulm  on RA    Cardio  normotensive  c/w telemetry    GI  regular diet   bowel regimen    Heme  monitor daily cbc  DVT ppx continue with home eliquis    Renal  monitor CMP  replete electrolytes as needed  monitor I&Os    Endocrine  c/w home synthroid 75 mcg    ID  no acute issues, continue to monitor vitals    Psych  continue with home psychiatric meds, psych at [facility] cleared patient for DC with no further medication recommendations.   1:1 observation discontinued.      Patient is a right handed 37 year old female w/ PMHx obesity, factor V Leiden deficiency a/w DVT/PE (apixaban 5mg BID), bipolar disorder, endometriosis, hypothyroid, recently discharged on 7/2/22 after a visit for shoulder abscess and had drainage by IR, who then presented again to [facility] ED7/4/22 w/ complaint of lower back pain worsening over the prior day w/ MRI consistent w/ chronic degenerative disease during without acutely actionable findings. Patient was discharged and seated on a stretcher awaiting a ride when she endorsed dizziness to staff, started to have a witnessed seizure-like activity. Per chart review patient received lorazepam 2mg IM a total of 4 times & 3,000mg of levetiracetam in the ED for refractory seizures Patient transferred to [facility] EMU for event capture. Patient is tachycardic when having episodes, but pulse is wnl at all other times.       Impression  Electrographic confirmed Nonepileptic seizures/functional neurological disorder    Recommendations  VEEG  discontinued VPA 250mg BID PO, start trileptal 100 mg bid  Seroquel 200/50/200 qday  rescue medications: zyprexa 2.5mg PRN, depakote 1g (second line)  Neuro checks Q4h  continue home medications  seizure precautions  psychiatry following, recs appreciated\  Will be discharged home on 7/11 or 7/12 pending pysch recommendations.     Pulm  on RA    Cardio  normotensive  c/w telemetry    GI  regular diet   bowel regimen    Heme  monitor daily cbc  DVT ppx continue with home eliquis    Renal  monitor CMP  replete electrolytes as needed  monitor I&Os    Endocrine  c/w home synthroid 75 mcg    ID  no acute issues, continue to monitor vitals    Psych  continue with home psychiatric meds, psych at [facility] cleared patient for DC with no further medication recommendations.   1:1 observation discontinued.      Patient is a right handed 37 year old female w/ PMHx obesity, factor V Leiden deficiency a/w DVT/PE (apixaban 5mg BID), bipolar disorder, endometriosis, hypothyroid, recently discharged on 7/2/22 after a visit for shoulder abscess and had drainage by IR, who then presented again to [facility] ED7/4/22 w/ complaint of lower back pain worsening over the prior day w/ MRI consistent w/ chronic degenerative disease during without acutely actionable findings. Patient was discharged and seated on a stretcher awaiting a ride when she endorsed dizziness to staff, started to have a witnessed seizure-like activity. Per chart review patient received lorazepam 2mg IM a total of 4 times & 3,000mg of levetiracetam in the ED for refractory seizures Patient transferred to [facility] EMU for event capture. Patient is tachycardic when having episodes, but pulse is wnl at all other times.       Impression  Electrographic confirmed Nonepileptic seizures/functional neurological disorder    Recommendations  start trileptal 100 mg bid  Seroquel 200/50/200 qday  rescue medications: zyprexa 2.5mg PRN, depakote 1g (second line)  Neuro checks Q4h  continue home medications  psychiatry following, recs appreciated  Will be discharged 7/11 pending pysch recommendations.     Pulm  on RA    Cardio  normotensive  c/w telemetry    GI  regular diet   bowel regimen    Heme  monitor daily cbc  DVT ppx continue with home eliquis    Renal  monitor CMP  replete electrolytes as needed  monitor I&Os    Endocrine  c/w home synthroid 75 mcg    ID  no acute issues, continue to monitor vitals    Psych  continue with home psychiatric meds, psych at [facility] cleared patient for DC with no further medication recommendations.     case discussed with Epilepsy Attending, Dr. Miller

## 2022-07-11 NOTE — PROVIDER CONTACT NOTE (OTHER) - ASSESSMENT
Again pt had twitching whole body and head turned to right side
C/o rash and itching on her face after head bath
Pt crying/tearful
Pt is little more responsive than before. Pt refused to do the Blood work by pulling her hand saying no
Pt is very sleepy. Not responding to commands
Pt still in active seizure event. see KBC from 10:40.
Still pt is very sleepy. Not following the commands
pt calm and patient. able to make needs known. concerned she is having auras because she does not feel like herself.
reports chest tightness, denies radiation, denies nausea/vomiting   vitals:  /83 , SaO2 98% on room air
Pt turned her head to right side and whole body twitching and trembling, O2 sat came down to 70%
Pt became unresponsive. No distress noted.O2 [id number]%, /82 , Hr-150/mt
see documentation; vital signs stable, placed on oxygen as level at start of event below 90%

## 2022-07-11 NOTE — DISCHARGE NOTE NURSING/CASE MANAGEMENT/SOCIAL WORK - NSDCPEFALRISK_GEN_ALL_CORE
For information on Fall & Injury Prevention, visit: [email or web address] OR  [email or web address] OR  https://www.cdc.gov/steadi/patient.html

## 2022-07-11 NOTE — EEG REPORT - NS EEG TEXT BOX
Day 3 – 	Start: 7/10/2022 08:00 AM     	End: 7/11/2022 08:00 AM  	Duration: 24hr 00 min     Daily EEG Visual Analysis    FINDINGS:  The background was continuous, spontaneously variable and reactive. During wakefulness, the posterior dominant rhythm consisted of symmetric, well-modulated 9-10 Hz activity, with amplitude to 25 uV, that attenuated to eye opening.  Low amplitude frontal beta was noted in wakefulness.    Background Slowing:  None were present    Focal Slowing:   None were present.    Sleep Background:  Drowsiness was characterized by fragmentation, attenuation, and slowing of the background activity.    Sleep was characterized by the presence of vertex waves, symmetric sleep spindles and K-complexes.    Other Non-Epileptiform Findings:  None were present.      Activation Procedures:   Hyperventilation was not performed.    Photic stimulation was not performed.    Interictal Epileptiform Activity:   None were present.    Events:    Event 3:   d1 22:02:56		Awake chatting with PCT  d1 22:03:43		Patient Event  d1 22:05:36		ECG 120bpm, rapid shallow breathing  d1 22:07:55		Generalized theta slowing  d1 22:15:25		Not responding to verbal / tactile stimulation   d1 22:16:46		Trembling to right, LUE dystonic pose  d1 22:16:57		Tachy, rapid shallow breathing  d1 22:19:23		Rhythmic artifact  d1 22:19:40		ECG 150bpm  d1 22:19:42		head again pillow on laying on right  d1 22:22:08		Semi-rhythmic truncal shaking  d1 22:22:22		less artifact here  d1 22:22:35		better artifact after repositioned  d1 22:23:56		leans head into pillow again on right  d1 22:30:14		head to right again artifact  d1 22:36:21		spoke again to avoid heavy sedation  d1 22:36:33		head bobbing than trembling  d1 22:49:49		Relaxed  d1 22:56:43		Shaking  d1 22:59:50		Jactitation    Normal background, no electrographic correlate throughout these events     Artifacts:  Intermittent myogenic and movement artifacts were noted.    ECG:  The heart rate on single channel ECG was predominantly between  BPM.    AEDs Depakote 250mg bid      EEG Summary:  Abnormal EEG in the awake, drowsy and asleep states.  Generalized, mild, slowing  Three typical clinical events on day 2 and 3 without electrographic abnormalities associated       Impression/Clinical Correlate:  Three typical prolonged clinical events on day 2 and 3 without electrographic abnormalities associated, clinically most concerning for psychogenic etiology aka psychogenic nonepileptic attacks or functional seizures.  Mild diffuse cerebral dysfunction   No epileptiform pattern or seizure observed    Day 3 – 	Start: 7/10/2022 08:00 AM     	End: 7/11/2022 08:00 AM  	Duration: 24hr 00 min     Daily EEG Visual Analysis    FINDINGS:  The background was continuous, spontaneously variable and reactive. During wakefulness, the posterior dominant rhythm consisted of symmetric, well-modulated 9-10 Hz activity, with amplitude to 25 uV, that attenuated to eye opening.  Low amplitude frontal beta was noted in wakefulness.    Background Slowing:  None were present    Focal Slowing:   None were present.    Sleep Background:  Drowsiness was characterized by fragmentation, attenuation, and slowing of the background activity.    Sleep was characterized by the presence of vertex waves, symmetric sleep spindles and K-complexes.    Other Non-Epileptiform Findings:  None were present.      Activation Procedures:   Hyperventilation was not performed.    Photic stimulation was not performed.    Interictal Epileptiform Activity:   None were present.    Events:    Event 3:   d1 22:02:56		Awake chatting with PCT  d1 22:03:43		Patient Event  d1 22:05:36		ECG 120bpm, rapid shallow breathing  d1 22:07:55		Generalized theta slowing  d1 22:15:25		Not responding to verbal / tactile stimulation   d1 22:16:46		Trembling to right, LUE dystonic pose  d1 22:16:57		Tachy, rapid shallow breathing  d1 22:19:23		Rhythmic artifact  d1 22:19:40		ECG 150bpm  d1 22:19:42		head again pillow on laying on right  d1 22:22:08		Semi-rhythmic truncal shaking  d1 22:22:22		less artifact here  d1 22:22:35		better artifact after repositioned  d1 22:23:56		leans head into pillow again on right  d1 22:30:14		head to right again artifact  d1 22:36:21		spoke again to avoid heavy sedation  d1 22:36:33		head bobbing than trembling  d1 22:49:49		Relaxed  d1 22:56:43		Shaking  d1 22:59:50		Jactitation    Normal background, no electrographic correlate throughout these events     Artifacts:  Intermittent myogenic and movement artifacts were noted.    ECG:  The heart rate on single channel ECG was predominantly between  BPM.    AEDs Depakote 250mg bid      EEG Summary:  Abnormal EEG in the awake, drowsy and asleep states.  Generalized, mild, slowing  Three typical clinical events on day 2 and 3 without any associated electrographic abnormalities.      Impression/Clinical Correlate:  Three typical prolonged clinical events on day 2 and 3 without any associated electrographic abnormalities, clinically most concerning for psychogenic etiology aka psychogenic nonepileptic attacks or functional seizures.  Mild diffuse cerebral dysfunction   No epileptiform pattern or seizure observed     NOTE: The heart rate was tachycardic and irregular at times.    Ulises Clay MD  Neurology Attending Physician
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
## 2022-07-11 NOTE — BH CONSULTATION LIAISON ASSESSMENT NOTE - OTHER PAST PSYCHIATRIC HISTORY (INCLUDE DETAILS REGARDING ONSET, COURSE OF ILLNESS, INPATIENT/OUTPATIENT TREATMENT)
As per chart:       As per chart:  Patient has long chart history pseudoseizures, drug seeking behavior (such as asking for medication through IV), cocaine use disorder, intellectual disability, PPHx bipolar disorder vs schizoaffective disorder, personality disorders, multiple prior inpatient psychiatric admissions, connected in outpatient care at [facility] (sees psychiatrist [email or web address] via telepsychiatry), hx 2 prior suicide attempts ( recent OD 1 month ago was reported during last medical admission but not confirmed) , hx SIB, hx aggression and intermittent explosive episodes     As per chart:  Patient has long chart history pseudoseizures, drug seeking behavior (such as asking for medication through IV), cocaine use disorder, intellectual disability, PPHx bipolar disorder vs schizoaffective disorder, personality disorders, multiple prior inpatient psychiatric admissions, connected in outpatient care at [facility] (sees psychiatrist [id number] via telepsychiatry), hx 2 prior suicide attempts ( recent OD 1 month ago was reported during last medical admission but not confirmed) , hx SIB, hx aggression and intermittent explosive episodes.

## 2022-07-11 NOTE — BH CONSULTATION LIAISON ASSESSMENT NOTE - CURRENT MEDICATION
MEDICATIONS  (STANDING):  apixaban 5 milliGRAM(s) Oral two times a day  ARIPiprazole 30 milliGRAM(s) Oral daily  fluticasone propionate 50 MICROgram(s)/spray Nasal Spray 1 Spray(s) Both Nostrils two times a day  levothyroxine 75 MICROGram(s) Oral daily  lurasidone 40 milliGRAM(s) Oral daily  pantoprazole    Tablet 40 milliGRAM(s) Oral before breakfast  polyethylene glycol 3350 17 Gram(s) Oral daily  QUEtiapine 200 milliGRAM(s) Oral two times a day  QUEtiapine 50 milliGRAM(s) Oral <User Schedule>    MEDICATIONS  (PRN):  acetaminophen     Tablet .. 650 milliGRAM(s) Oral every 6 hours PRN Moderate Pain (4 - 6)  ALBUTerol    90 MICROgram(s) HFA Inhaler 2 Puff(s) Inhalation every 6 hours PRN Shortness of Breath and/or Wheezing  diphenhydrAMINE 25 milliGRAM(s) Oral at bedtime PRN Insomnia  haloperidol    Injectable 2 milliGRAM(s) IntraMuscular every 6 hours PRN Agitation  senna 2 Tablet(s) Oral at bedtime PRN Constipation

## 2022-07-11 NOTE — PROVIDER CONTACT NOTE (OTHER) - SITUATION
Pt became unresponsive
Pt appears to be having seizure event. not responding to team
Pt continues to have rhythmic jerking and not responding to RN. airway maintained with minimal suctioning and oxygen still on patient.
Again pt had twitching whole body and head turned to right side
C/o rash and itching on her face after head bath
Checked IV this am and found it infiltrated. two people have tried to get access multiple times on patient, unable to regain access. needs a midline for future management.
Pt feeling down and depressed. Feeling scared that she is off seizure medication and feels like she is going to have seizures. States that sometimes she (continued in background)
Pt is little more responsive than before. Pt refused to do the Blood work by pulling her hand saying no
Pt is very sleepy. Not responding to commands
Pt turned her head to right side and whole body twitching and trembling, O2 sat came down to 70%
Still pt is very sleepy. Not following the commands
pt c/o chest tightness

## 2022-07-13 LAB
CULTURE RESULTS: SIGNIFICANT CHANGE UP
CULTURE RESULTS: SIGNIFICANT CHANGE UP
SPECIMEN SOURCE: SIGNIFICANT CHANGE UP
SPECIMEN SOURCE: SIGNIFICANT CHANGE UP

## 2022-07-21 NOTE — ED ADULT TRIAGE NOTE - HEIGHT IN INCHES
8 Attending MD Milligan:   I personally have seen and examined this patient. I have performed a substantive portion of the visit including all aspects of the medical decision making.   Physician assistant note reviewed and agree on plan of care and except where noted.        70M with recent h/o biliary stricture (currently undergoing investigation for rule out cholangiocarcinoma) sp PD and CBD stent presenting with subjective fevers and upper abdominal pain. Patient on exam well appearing, oral temp 99.2, mild ttp epigastrium, normotensive. Ddx includes pancreatitis, stent obstruction, cholangitis. Plan for labs, CT a/p, pan culture, empiric IV zosyn         *The above represents an initial assessment/impression. Please refer to progress notes for potential changes in patient clinical course*

## 2022-07-22 ENCOUNTER — APPOINTMENT (OUTPATIENT)
Dept: PULMONOLOGY | Facility: CLINIC | Age: 37
End: 2022-07-22

## 2022-07-30 LAB
CULTURE RESULTS: SIGNIFICANT CHANGE UP
SPECIMEN SOURCE: SIGNIFICANT CHANGE UP

## 2022-08-06 LAB
CULTURE RESULTS: SIGNIFICANT CHANGE UP
NIGHT BLUE STAIN TISS: SIGNIFICANT CHANGE UP
SPECIMEN SOURCE: SIGNIFICANT CHANGE UP

## 2022-08-09 DIAGNOSIS — L02.419 CUTANEOUS ABSCESS OF LIMB, UNSPECIFIED: ICD-10-CM

## 2022-08-09 RX ORDER — QUETIAPINE FUMARATE 200 MG/1
200 TABLET ORAL TWICE DAILY
Refills: 0 | Status: ACTIVE | COMMUNITY

## 2022-08-09 RX ORDER — DIVALPROEX SODIUM 500 MG/1
TABLET, DELAYED RELEASE ORAL
Refills: 0 | Status: DISCONTINUED | COMMUNITY
End: 2022-08-09

## 2022-08-09 RX ORDER — LEVOTHYROXINE SODIUM 137 UG/1
TABLET ORAL
Refills: 0 | Status: DISCONTINUED | COMMUNITY
End: 2022-08-09

## 2022-08-09 RX ORDER — OXCARBAZEPINE 150 MG/1
150 TABLET, FILM COATED ORAL TWICE DAILY
Refills: 0 | Status: ACTIVE | COMMUNITY

## 2022-08-09 RX ORDER — QUETIAPINE FUMARATE 50 MG/1
50 TABLET ORAL DAILY
Refills: 0 | Status: ACTIVE | COMMUNITY

## 2022-08-09 RX ORDER — OMEPRAZOLE 20 MG/1
TABLET, DELAYED RELEASE ORAL
Refills: 0 | Status: DISCONTINUED | COMMUNITY
End: 2022-08-09

## 2022-08-09 RX ORDER — MELATONIN 3 MG
3 CAPSULE ORAL
Refills: 0 | Status: ACTIVE | COMMUNITY

## 2022-08-09 RX ORDER — ACETAMINOPHEN 500 MG/1
TABLET ORAL
Refills: 0 | Status: DISCONTINUED | COMMUNITY
End: 2022-08-09

## 2022-08-09 RX ORDER — RIVAROXABAN 2.5 MG/1
TABLET, FILM COATED ORAL
Refills: 0 | Status: DISCONTINUED | COMMUNITY
End: 2022-08-09

## 2022-08-12 NOTE — PROCEDURE NOTE - NSSIZEINFR_GEN_A_CORE
Pt arrived to PACU from OR, VSS, pt arouses to voice. Jennifer pad absent of drainage. Will continue to monitor. 4

## 2022-08-13 ENCOUNTER — EMERGENCY (EMERGENCY)
Facility: HOSPITAL | Age: 37
LOS: 0 days | Discharge: HOME | End: 2022-08-13
Attending: EMERGENCY MEDICINE

## 2022-08-13 VITALS
SYSTOLIC BLOOD PRESSURE: 114 MMHG | TEMPERATURE: 98 F | DIASTOLIC BLOOD PRESSURE: 80 MMHG | OXYGEN SATURATION: 99 % | WEIGHT: 270.07 LBS | HEIGHT: 68 IN | RESPIRATION RATE: 17 BRPM | HEART RATE: 90 BPM

## 2022-08-13 VITALS
OXYGEN SATURATION: 99 % | HEART RATE: 96 BPM | RESPIRATION RATE: 19 BRPM | SYSTOLIC BLOOD PRESSURE: 127 MMHG | DIASTOLIC BLOOD PRESSURE: 89 MMHG

## 2022-08-13 DIAGNOSIS — Z79.01 LONG TERM (CURRENT) USE OF ANTICOAGULANTS: ICD-10-CM

## 2022-08-13 DIAGNOSIS — E03.9 HYPOTHYROIDISM, UNSPECIFIED: ICD-10-CM

## 2022-08-13 DIAGNOSIS — F31.9 BIPOLAR DISORDER, UNSPECIFIED: ICD-10-CM

## 2022-08-13 DIAGNOSIS — K21.9 GASTRO-ESOPHAGEAL REFLUX DISEASE WITHOUT ESOPHAGITIS: ICD-10-CM

## 2022-08-13 DIAGNOSIS — Z91.018 ALLERGY TO OTHER FOODS: ICD-10-CM

## 2022-08-13 DIAGNOSIS — Z88.0 ALLERGY STATUS TO PENICILLIN: ICD-10-CM

## 2022-08-13 DIAGNOSIS — Z91.040 LATEX ALLERGY STATUS: ICD-10-CM

## 2022-08-13 DIAGNOSIS — Z20.822 CONTACT WITH AND (SUSPECTED) EXPOSURE TO COVID-19: ICD-10-CM

## 2022-08-13 DIAGNOSIS — N83.201 UNSPECIFIED OVARIAN CYST, RIGHT SIDE: ICD-10-CM

## 2022-08-13 DIAGNOSIS — J45.909 UNSPECIFIED ASTHMA, UNCOMPLICATED: ICD-10-CM

## 2022-08-13 DIAGNOSIS — N93.9 ABNORMAL UTERINE AND VAGINAL BLEEDING, UNSPECIFIED: ICD-10-CM

## 2022-08-13 DIAGNOSIS — Z86.711 PERSONAL HISTORY OF PULMONARY EMBOLISM: ICD-10-CM

## 2022-08-13 DIAGNOSIS — Z86.718 PERSONAL HISTORY OF OTHER VENOUS THROMBOSIS AND EMBOLISM: ICD-10-CM

## 2022-08-13 DIAGNOSIS — Z88.6 ALLERGY STATUS TO ANALGESIC AGENT: ICD-10-CM

## 2022-08-13 DIAGNOSIS — Z88.8 ALLERGY STATUS TO OTHER DRUGS, MEDICAMENTS AND BIOLOGICAL SUBSTANCES STATUS: ICD-10-CM

## 2022-08-13 DIAGNOSIS — N80.9 ENDOMETRIOSIS, UNSPECIFIED: ICD-10-CM

## 2022-08-13 LAB
ALBUMIN SERPL ELPH-MCNC: 3.5 G/DL — SIGNIFICANT CHANGE UP (ref 3.3–5)
ALP SERPL-CCNC: 52 U/L — SIGNIFICANT CHANGE UP (ref 40–120)
ALT FLD-CCNC: 24 U/L — SIGNIFICANT CHANGE UP (ref 12–78)
ANION GAP SERPL CALC-SCNC: 7 MMOL/L — SIGNIFICANT CHANGE UP (ref 5–17)
APTT BLD: 24.7 SEC — LOW (ref 27.5–35.5)
AST SERPL-CCNC: 13 U/L — LOW (ref 15–37)
BASOPHILS # BLD AUTO: 0.01 K/UL — SIGNIFICANT CHANGE UP (ref 0–0.2)
BASOPHILS NFR BLD AUTO: 0.2 % — SIGNIFICANT CHANGE UP (ref 0–2)
BILIRUB SERPL-MCNC: 0.3 MG/DL — SIGNIFICANT CHANGE UP (ref 0.2–1.2)
BLD GP AB SCN SERPL QL: SIGNIFICANT CHANGE UP
BUN SERPL-MCNC: 13 MG/DL — SIGNIFICANT CHANGE UP (ref 7–23)
CALCIUM SERPL-MCNC: 9 MG/DL — SIGNIFICANT CHANGE UP (ref 8.5–10.1)
CHLORIDE SERPL-SCNC: 111 MMOL/L — HIGH (ref 96–108)
CO2 SERPL-SCNC: 24 MMOL/L — SIGNIFICANT CHANGE UP (ref 22–31)
CREAT SERPL-MCNC: 0.67 MG/DL — SIGNIFICANT CHANGE UP (ref 0.5–1.3)
EGFR: 117 ML/MIN/1.73M2 — SIGNIFICANT CHANGE UP
EOSINOPHIL # BLD AUTO: 0.03 K/UL — SIGNIFICANT CHANGE UP (ref 0–0.5)
EOSINOPHIL NFR BLD AUTO: 0.6 % — SIGNIFICANT CHANGE UP (ref 0–6)
FLUAV AG NPH QL: SIGNIFICANT CHANGE UP
FLUBV AG NPH QL: SIGNIFICANT CHANGE UP
GLUCOSE SERPL-MCNC: 88 MG/DL — SIGNIFICANT CHANGE UP (ref 70–99)
HCG SERPL-ACNC: <1 MIU/ML — SIGNIFICANT CHANGE UP
HCT VFR BLD CALC: 31.4 % — LOW (ref 34.5–45)
HCT VFR BLD CALC: 32.3 % — LOW (ref 34.5–45)
HGB BLD-MCNC: 10.3 G/DL — LOW (ref 11.5–15.5)
HGB BLD-MCNC: 10.8 G/DL — LOW (ref 11.5–15.5)
IMM GRANULOCYTES NFR BLD AUTO: 0.2 % — SIGNIFICANT CHANGE UP (ref 0–1.5)
INR BLD: 1.17 RATIO — HIGH (ref 0.88–1.16)
LIDOCAIN IGE QN: 103 U/L — SIGNIFICANT CHANGE UP (ref 73–393)
LYMPHOCYTES # BLD AUTO: 2.2 K/UL — SIGNIFICANT CHANGE UP (ref 1–3.3)
LYMPHOCYTES # BLD AUTO: 45.9 % — HIGH (ref 13–44)
MCHC RBC-ENTMCNC: 30 PG — SIGNIFICANT CHANGE UP (ref 27–34)
MCHC RBC-ENTMCNC: 30.2 PG — SIGNIFICANT CHANGE UP (ref 27–34)
MCHC RBC-ENTMCNC: 32.8 G/DL — SIGNIFICANT CHANGE UP (ref 32–36)
MCHC RBC-ENTMCNC: 33.4 G/DL — SIGNIFICANT CHANGE UP (ref 32–36)
MCV RBC AUTO: 90.2 FL — SIGNIFICANT CHANGE UP (ref 80–100)
MCV RBC AUTO: 91.5 FL — SIGNIFICANT CHANGE UP (ref 80–100)
MONOCYTES # BLD AUTO: 0.45 K/UL — SIGNIFICANT CHANGE UP (ref 0–0.9)
MONOCYTES NFR BLD AUTO: 9.4 % — SIGNIFICANT CHANGE UP (ref 2–14)
NEUTROPHILS # BLD AUTO: 2.09 K/UL — SIGNIFICANT CHANGE UP (ref 1.8–7.4)
NEUTROPHILS NFR BLD AUTO: 43.7 % — SIGNIFICANT CHANGE UP (ref 43–77)
NRBC # BLD: 0 /100 WBCS — SIGNIFICANT CHANGE UP (ref 0–0)
NRBC # BLD: 0 /100 WBCS — SIGNIFICANT CHANGE UP (ref 0–0)
PLATELET # BLD AUTO: 195 K/UL — SIGNIFICANT CHANGE UP (ref 150–400)
PLATELET # BLD AUTO: 198 K/UL — SIGNIFICANT CHANGE UP (ref 150–400)
POTASSIUM SERPL-MCNC: 3.9 MMOL/L — SIGNIFICANT CHANGE UP (ref 3.5–5.3)
POTASSIUM SERPL-SCNC: 3.9 MMOL/L — SIGNIFICANT CHANGE UP (ref 3.5–5.3)
PROT SERPL-MCNC: 7.3 GM/DL — SIGNIFICANT CHANGE UP (ref 6–8.3)
PROTHROM AB SERPL-ACNC: 14 SEC — HIGH (ref 10.5–13.4)
RBC # BLD: 3.43 M/UL — LOW (ref 3.8–5.2)
RBC # BLD: 3.58 M/UL — LOW (ref 3.8–5.2)
RBC # FLD: 14.4 % — SIGNIFICANT CHANGE UP (ref 10.3–14.5)
RBC # FLD: 14.5 % — SIGNIFICANT CHANGE UP (ref 10.3–14.5)
SARS-COV-2 RNA SPEC QL NAA+PROBE: SIGNIFICANT CHANGE UP
SODIUM SERPL-SCNC: 142 MMOL/L — SIGNIFICANT CHANGE UP (ref 135–145)
WBC # BLD: 4.18 K/UL — SIGNIFICANT CHANGE UP (ref 3.8–10.5)
WBC # BLD: 4.79 K/UL — SIGNIFICANT CHANGE UP (ref 3.8–10.5)
WBC # FLD AUTO: 4.18 K/UL — SIGNIFICANT CHANGE UP (ref 3.8–10.5)
WBC # FLD AUTO: 4.79 K/UL — SIGNIFICANT CHANGE UP (ref 3.8–10.5)

## 2022-08-13 PROCEDURE — 76830 TRANSVAGINAL US NON-OB: CPT | Mod: 26

## 2022-08-13 PROCEDURE — 74176 CT ABD & PELVIS W/O CONTRAST: CPT | Mod: 26,MA

## 2022-08-13 PROCEDURE — 99285 EMERGENCY DEPT VISIT HI MDM: CPT

## 2022-08-13 PROCEDURE — 76856 US EXAM PELVIC COMPLETE: CPT | Mod: 26,59

## 2022-08-13 RX ORDER — OXYCODONE HYDROCHLORIDE 5 MG/1
1 TABLET ORAL
Qty: 4 | Refills: 0
Start: 2022-08-13

## 2022-08-13 RX ORDER — METOCLOPRAMIDE HCL 10 MG
10 TABLET ORAL ONCE
Refills: 0 | Status: COMPLETED | OUTPATIENT
Start: 2022-08-13 | End: 2022-08-13

## 2022-08-13 RX ORDER — SODIUM CHLORIDE 9 MG/ML
1000 INJECTION INTRAMUSCULAR; INTRAVENOUS; SUBCUTANEOUS ONCE
Refills: 0 | Status: COMPLETED | OUTPATIENT
Start: 2022-08-13 | End: 2022-08-13

## 2022-08-13 RX ORDER — OXYCODONE HYDROCHLORIDE 5 MG/1
5 TABLET ORAL ONCE
Refills: 0 | Status: DISCONTINUED | OUTPATIENT
Start: 2022-08-13 | End: 2022-08-13

## 2022-08-13 RX ORDER — OXYCODONE HYDROCHLORIDE 5 MG/1
1 TABLET ORAL
Qty: 12 | Refills: 0
Start: 2022-08-13 | End: 2022-08-15

## 2022-08-13 RX ORDER — DIPHENHYDRAMINE HCL 50 MG
50 CAPSULE ORAL ONCE
Refills: 0 | Status: COMPLETED | OUTPATIENT
Start: 2022-08-13 | End: 2022-08-13

## 2022-08-13 RX ORDER — ACETAMINOPHEN 500 MG
1000 TABLET ORAL ONCE
Refills: 0 | Status: COMPLETED | OUTPATIENT
Start: 2022-08-13 | End: 2022-08-13

## 2022-08-13 RX ORDER — IBUPROFEN 200 MG
1 TABLET ORAL
Qty: 12 | Refills: 0
Start: 2022-08-13 | End: 2022-08-15

## 2022-08-13 RX ORDER — MORPHINE SULFATE 50 MG/1
4 CAPSULE, EXTENDED RELEASE ORAL ONCE
Refills: 0 | Status: DISCONTINUED | OUTPATIENT
Start: 2022-08-13 | End: 2022-08-13

## 2022-08-13 RX ADMIN — OXYCODONE HYDROCHLORIDE 5 MILLIGRAM(S): 5 TABLET ORAL at 14:09

## 2022-08-13 RX ADMIN — Medication 1000 MILLIGRAM(S): at 12:28

## 2022-08-13 RX ADMIN — SODIUM CHLORIDE 1000 MILLILITER(S): 9 INJECTION INTRAMUSCULAR; INTRAVENOUS; SUBCUTANEOUS at 05:47

## 2022-08-13 RX ADMIN — Medication 10 MILLIGRAM(S): at 11:33

## 2022-08-13 RX ADMIN — MORPHINE SULFATE 4 MILLIGRAM(S): 50 CAPSULE, EXTENDED RELEASE ORAL at 11:34

## 2022-08-13 RX ADMIN — Medication 50 MILLIGRAM(S): at 11:33

## 2022-08-13 RX ADMIN — OXYCODONE HYDROCHLORIDE 5 MILLIGRAM(S): 5 TABLET ORAL at 15:24

## 2022-08-13 RX ADMIN — Medication 400 MILLIGRAM(S): at 10:09

## 2022-08-13 RX ADMIN — MORPHINE SULFATE 4 MILLIGRAM(S): 50 CAPSULE, EXTENDED RELEASE ORAL at 13:16

## 2022-08-13 NOTE — ED ADULT NURSE NOTE - CHIEF COMPLAINT QUOTE
hx of hypothyroid , factor V ( on Eliquis), GERD bipolar PW vaginal bleeding a/w lower ABD x 3 weeks. pt reports changing 6 pads in 24hrs, PW slight SOB. denies dizziness, chest pain. LMP last week of July .

## 2022-08-13 NOTE — ED PROVIDER NOTE - PATIENT PORTAL LINK FT
You can access the FollowMyHealth Patient Portal offered by [facility] by registering at the following website: [email or web address]. By joining [facility]’s FollowMyHealth portal, you will also be able to view your health information using other applications (apps) compatible with our system.

## 2022-08-13 NOTE — ED PROVIDER NOTE - OBJECTIVE STATEMENT
Chief complaint of persistent vaginal bleeding x 1 month. No fever, no chest pain, no shortness of breath, denies abdominal pain of evaluation.   Pt states she take Eliquis for hypercoagulable state,  hx of DVTs and PE.  No calf tenderness.

## 2022-08-13 NOTE — ED PROVIDER NOTE - NSFOLLOWUPINSTRUCTIONS_ED_ALL_ED_FT
You were seen in the ED for vaginal bleeding and abdominal pain.    Your ultrasound and CT scan did not show any acute findings.    You can use tylenol as needed for pain.    SEEK IMMEDIATE MEDICAL CARE IF YOU HAVE ANY OF THE FOLLOWING SYMPTOMS: worsening abdominal pain, uncontrollable vomiting, profuse diarrhea, inability to have bowel movements or pass gas, black or bloody stools, fever accompanying chest pain or back pain, or fainting. These symptoms may represent a serious problem that is an emergency. Do not wait to see if the symptoms will go away. Get medical help right away. Call 911 and do not drive yourself to the hospital. You were seen in the ED for vaginal bleeding and abdominal pain.    Your ultrasound and CT scan showed ovarian cysts.    Follow up with your GYN.    You can use oxycodone every 6 hours only for severe pain.    You can use motrin every 6 hours for mild-moderate pain.    SEEK IMMEDIATE MEDICAL CARE IF YOU HAVE ANY OF THE FOLLOWING SYMPTOMS: worsening abdominal pain, uncontrollable vomiting, profuse diarrhea, inability to have bowel movements or pass gas, black or bloody stools, fever accompanying chest pain or back pain, or fainting. These symptoms may represent a serious problem that is an emergency. Do not wait to see if the symptoms will go away. Get medical help right away. Call 911 and do not drive yourself to the hospital.

## 2022-08-13 NOTE — ED PROVIDER NOTE - NSDCPRINTRESULTS_ED_ALL_ED
occupational therapy Patient requests all Lab, Cardiology, and Radiology Results on their Discharge Instructions

## 2022-08-13 NOTE — ED ADULT NURSE NOTE - ED STAT RN HANDOFF DETAILS
Report endorsed to oncoming ALEYDA Yun. Pt is stable and resting comfortably in bed. No signs and symptoms of acute distress noted at this time. Safety measures maintained. IV sites checked and patent. Fluids infusing. Any issues endorsed to oncoming RN for follow up.

## 2022-08-13 NOTE — ED ADULT NURSE NOTE - NSIMPLEMENTINTERV_GEN_ALL_ED
Implemented All Universal Safety Interventions:  Railroad to call system. Call bell, personal items and telephone within reach. Instruct patient to call for assistance. Room bathroom lighting operational. Non-slip footwear when patient is off stretcher. Physically safe environment: no spills, clutter or unnecessary equipment. Stretcher in lowest position, wheels locked, appropriate side rails in place.

## 2022-08-13 NOTE — ED ADULT NURSE NOTE - OBJECTIVE STATEMENT
covering for primary RN Yadi, 36yo F, Aox4, hx of hypothyroid , factor V ( on Eliquis), GERD bipolar, PW vaginal bleeding a/w lower ABD x 3 weeks. pt reports changing 6 pads in 24hrs. denies dizziness, chest pain, n/v/d, urinary symptoms. LMP last week of July, denies pregnancy.

## 2022-08-13 NOTE — ED PROVIDER NOTE - PROGRESS NOTE DETAILS
Patient signed out to me. Negative TVUS and CT AP for acute pathology. Patient thinks sx secondary to her endometriosis. Requiring morphine in the ED and oxycodone. Discussed with patient's nurse, requests patient not be sent home with opiates as this has been an issue for patient. Patient signed out to me. Negative TVUS and CT AP for acute pathology. Patient thinks sx secondary to her endometriosis. Requiring morphine in the ED and oxycodone. Will send 4 oxycodone to pharmacy prn severe pain.

## 2022-08-13 NOTE — ED ADULT TRIAGE NOTE - CHIEF COMPLAINT QUOTE
Progress Note      Patient Name: Zafar Hodge   Patient ID: [id number]   Sex: Male   YOB: 1977    Primary Care Provider: Ky Molina MD   Referring Provider: Ky Molina MD    Visit Date: April 30, 2021    Provider: Chester Cloud MD   Location: [facility]   Location Address: [address] [place] [address]  [id number]   Location Phone: [phone]          Chief Complaint     f/u discuss MRI results       History Of Present Illness  Zafar Hodge is a 44 year old /Black male who presents today to [facility] today referred from Ky Molina MD.      44-year-old man here for follow-up of his headache.  He states that he stopped topiramate last week.  He states that the topiramate gave him haziness and he could not function in his  class.  He took Aimovig injection in January 27 and he thinks that he had another Aimovig injection a month later however we do not have any records of this.  He states that for 2 months he did not have much of any headaches whatsoever.  He has never tried amitriptyline in the past.  Has no heart disease.  He does not have skipping of his heartbeat or palpitations.    He states that his headaches are mild.  He is getting 2 headaches a week.  It is mild compared to what he had in the past prior to taking the Aimovig injection and topiramate which he was getting it 15-20 times a month.       Past Medical History  Headache; High blood pressure; Stroke; History of         Past Surgical History  *Denies any surgical procedures         Medication List  Aimovig Autoinjector 70 mg/mL subcutaneous auto-injector; aspirin 81 mg oral tablet,delayed release (DR/EC); topiramate 25 mg oral tablet         Allergy List  NO KNOWN DRUG ALLERGIES       Allergies Reconciled  Family Medical History  Family history of stroke         Social History  Tobacco (Former)         Review of  Systems  · Constitutional  o Denies  o : chills, excessive sweating, fatigue, fever, sycope/passing out, weight gain, weight loss  · Eyes  o Denies  o : changes in vision, blurry vision, double vision  · HENT  o Denies  o : loss of hearing, ringing in the ears, ear aches, sore throat, nasal congestion, sinus pain, nose bleeds, seasonal allergies  · Cardiovascular  o Denies  o : blood clots, swollen legs, anemia, easy burising or bleeding, transfusions  · Respiratory  o Denies  o : shortness of breath, dry cough, productive cough, pneumonia, COPD  · Gastrointestinal  o Denies  o : difficulty swallowing, reflux  · Genitourinary  o Denies  o : incontinence  · Neurologic  o Admits  o : headache  o Denies  o : seizure, stroke, tremor, loss of balance, falls, dizziness/vertigo, difficulty with sleep, numbness/tingling/paresthesia , difficulty with coordination, difficulty with dexterity, weakness  · Musculoskeletal  o Denies  o : neck stiffness/pain, swollen lymph nodes, muscle aches, joint pain, weakness, spasms, sciatica, pain radiating in arm, pain radiating in leg, low back pain  · Endocrine  o Denies  o : diabetes, thyroid disorder  · Psychiatric  o Denies  o : anxiety, depression      Vitals  Date Time BP Position Site L\R Cuff Size HR RR TEMP (F) WT  HT  BMI kg/m2 BSA m2 O2 Sat FR L/min FiO2 HC       04/30/2021 12:53 /96 Sitting    95 - R   251lbs 16oz 6'   34.18 2.41             Physical Examination     Alert, fluent, phasic, follows commands well.  Optic external bilaterally.  There is no focal weakness.  Heart is regular rhythm normal in rate.           Assessment  · Chronic migraine without aura     346.70/G43.709  I will start him on amitriptyline at 10 mg initially and increase the dose by 10 mg weekly at bedtime until he is taking up to 5 nightly. Discussed with him that if he has control with 2 or 3 tablets nightly he is to stay at that dose. If there is no improvement of his symptoms in spite of  taking 5 nightly when he has adverse effects of the medication he is to call our office. Otherwise we will see him again in 3 months time for follow-up. I explained to him that respects from amitriptyline including dry mouth, constipation or urinary tension, palpitations. Should have any problems with amitriptyline I will give him Aimovig injection in the future.    Total time spent with patient coordinating patient care was 25 minutes.      Plan  · Medications  o amitriptyline 10 mg oral tablet   SIG: Take 10 mg nightly and increase the dose weekly by 1 tablet as directed up to 50 mg nightly.   DISP: (150) Tablet with 3 refills  Prescribed on 04/30/2021     o Medications have been Reconciled  o Transition of Care or Provider Policy  · Instructions  o Encouraged to follow-up with Primary Care Provider for preventative care.            Electronically Signed by: Chester Cloud MD -Author on April 30, 2021 05:37:12 PM   hx of hypothyroid , factor V ( on Eliquis), GERD bipolar PW vaginal bleeding a/w lower ABD x 3 weeks. pt reports changing 6 pads in 24hrs, PW slight SOB. denies dizziness, chest pain. LMP last week of July .

## 2022-08-13 NOTE — ED ADULT NURSE REASSESSMENT NOTE - NS ED NURSE REASSESS COMMENT FT1
pt received from previous shift resting on stretcher breathing spont/unlabored to ra awaiting transport to [place]. Will continue to monitor

## 2022-08-15 ENCOUNTER — APPOINTMENT (OUTPATIENT)
Dept: CARDIOLOGY | Facility: CLINIC | Age: 37
End: 2022-08-15

## 2022-08-15 ENCOUNTER — NON-APPOINTMENT (OUTPATIENT)
Age: 37
End: 2022-08-15

## 2022-08-15 VITALS
OXYGEN SATURATION: 98 % | TEMPERATURE: 98.2 F | WEIGHT: 267 LBS | SYSTOLIC BLOOD PRESSURE: 113 MMHG | HEART RATE: 106 BPM | RESPIRATION RATE: 16 BRPM | DIASTOLIC BLOOD PRESSURE: 78 MMHG | BODY MASS INDEX: 40.6 KG/M2

## 2022-08-15 DIAGNOSIS — M79.89 OTHER SPECIFIED SOFT TISSUE DISORDERS: ICD-10-CM

## 2022-08-15 DIAGNOSIS — Z86.59 PERSONAL HISTORY OF OTHER MENTAL AND BEHAVIORAL DISORDERS: ICD-10-CM

## 2022-08-15 DIAGNOSIS — F32.A DEPRESSION, UNSPECIFIED: ICD-10-CM

## 2022-08-15 PROCEDURE — 99204 OFFICE O/P NEW MOD 45 MIN: CPT | Mod: 25

## 2022-08-15 PROCEDURE — 93000 ELECTROCARDIOGRAM COMPLETE: CPT

## 2022-08-15 RX ORDER — TOPIRAMATE 50 MG/1
TABLET, FILM COATED ORAL
Refills: 0 | Status: DISCONTINUED | COMMUNITY
End: 2022-08-15

## 2022-08-15 NOTE — ASSESSMENT
[FreeTextEntry1] : 1. Check 2D echo. Defer any medical therapy for now.\par 2. We reviewed AHA exercise guidelines. 
72 yo male, PMHx history of CAD s/p 4VCABG (LIMA to LAD, SVG to OM, other 2 grafts closed), S/P multiple PCIs, HFrEF last 35-40% 4/2018, mod-severe MR, DM2, HTN, HLD, who recently returned from [place], with complaints of chest pain, found to have NSTEMI and EKG with  new LBBB. Patient was taken for urgent cardiac catheterization, received 1 CODI to SVG-OM1 graft. IABP was placed preemptively for support in the setting of known severe systolic heart failure and severe MR.

## 2022-08-15 NOTE — REASON FOR VISIT
[Symptom and Test Evaluation] : symptom and test evaluation [Hypertension] : hypertension [FreeTextEntry1] : Very pleasant 37 year old woman here with her health coordinator Barbara. \par \par The patient has had a 1 month history of palpitations, which are associated with objective tachycardia (I have reviewed the patient's record from the group home, HR ).  She also notes that she occasionally has diastolic HTN.\par \par 1. Check 2D echo. Defer any medical therapy for now.\par 2. We reviewed AHA exercise guidelines.

## 2022-08-16 RX ORDER — LANOLIN ALCOHOL/MO/W.PET/CERES
CREAM (GRAM) TOPICAL
Refills: 0 | Status: ACTIVE | COMMUNITY

## 2022-08-16 RX ORDER — ARIPIPRAZOLE 30 MG/1
30 TABLET ORAL DAILY
Refills: 0 | Status: ACTIVE | COMMUNITY

## 2022-08-16 RX ORDER — CAMPHOR 0.45 %
25 GEL (GRAM) TOPICAL EVERY 4 HOURS
Qty: 30 | Refills: 3 | Status: DISCONTINUED | COMMUNITY
Start: 2021-12-17 | End: 2022-08-16

## 2022-08-16 RX ORDER — ALBUTEROL 90 MCG
90 AEROSOL (GRAM) INHALATION
Refills: 0 | Status: DISCONTINUED | COMMUNITY
End: 2022-08-16

## 2022-08-16 RX ORDER — CETIRIZINE HYDROCHLORIDE 10 MG/1
10 TABLET, COATED ORAL
Qty: 30 | Refills: 11 | Status: DISCONTINUED | COMMUNITY
Start: 2022-02-11 | End: 2022-08-16

## 2022-08-16 RX ORDER — HALOPERIDOL LACTATE 2 MG/ML
CONCENTRATE, ORAL ORAL
Refills: 0 | Status: DISCONTINUED | COMMUNITY
End: 2022-08-16

## 2022-08-16 RX ORDER — CHLORPROMAZINE HYDROCHLORIDE 50 MG/1
50 TABLET, SUGAR COATED ORAL 4 TIMES DAILY
Refills: 0 | Status: ACTIVE | COMMUNITY
Start: 2022-07-02

## 2022-08-16 RX ORDER — CAMPHOR 0.45 %
25 GEL (GRAM) TOPICAL
Refills: 0 | Status: DISCONTINUED | COMMUNITY
End: 2022-08-16

## 2022-08-16 RX ORDER — FLUTICASONE PROPIONATE 110 UG/1
110 AEROSOL, METERED RESPIRATORY (INHALATION) DAILY
Refills: 0 | Status: ACTIVE | COMMUNITY
Start: 2022-07-29

## 2022-08-16 RX ORDER — ACETAMINOPHEN 325 MG/1
325 TABLET, FILM COATED ORAL EVERY 6 HOURS
Refills: 0 | Status: ACTIVE | COMMUNITY

## 2022-08-16 RX ORDER — POLYETHYLENE GLYCOL 3350 17 G/17G
17 POWDER, FOR SOLUTION ORAL DAILY
Refills: 0 | Status: ACTIVE | COMMUNITY
Start: 2022-05-22

## 2022-08-16 RX ORDER — CETIRIZINE HYDROCHLORIDE 10 MG/1
10 TABLET, FILM COATED ORAL
Qty: 30 | Refills: 1 | Status: DISCONTINUED | COMMUNITY
Start: 2021-12-17 | End: 2022-08-16

## 2022-08-16 RX ORDER — ALBUTEROL SULFATE 90 UG/1
108 (90 BASE) INHALANT RESPIRATORY (INHALATION) EVERY 4 HOURS
Refills: 0 | Status: ACTIVE | COMMUNITY
Start: 2022-07-11

## 2022-08-16 RX ORDER — NALTREXONE HYDROCHLORIDE 50 MG/1
50 TABLET, FILM COATED ORAL
Refills: 0 | Status: DISCONTINUED | COMMUNITY
End: 2022-08-16

## 2022-08-16 RX ORDER — PSYLLIUM HUSK (WITH SUGAR) 3 G/12 G
28.3 POWDER (GRAM) ORAL
Qty: 369 | Refills: 0 | Status: ACTIVE | COMMUNITY
Start: 2022-05-20

## 2022-08-16 RX ORDER — FLUTICASONE PROPIONATE 50 UG/1
50 SPRAY, METERED NASAL TWICE DAILY
Qty: 1 | Refills: 5 | Status: DISCONTINUED | COMMUNITY
Start: 2022-01-03 | End: 2022-08-16

## 2022-08-18 ENCOUNTER — EMERGENCY (EMERGENCY)
Facility: HOSPITAL | Age: 37
LOS: 0 days | Discharge: HOME | End: 2022-08-19
Attending: STUDENT IN AN ORGANIZED HEALTH CARE EDUCATION/TRAINING PROGRAM

## 2022-08-18 VITALS
HEIGHT: 68 IN | SYSTOLIC BLOOD PRESSURE: 125 MMHG | OXYGEN SATURATION: 100 % | RESPIRATION RATE: 18 BRPM | DIASTOLIC BLOOD PRESSURE: 88 MMHG | HEART RATE: 100 BPM | TEMPERATURE: 98 F | WEIGHT: 274.92 LBS

## 2022-08-18 DIAGNOSIS — R22.41 LOCALIZED SWELLING, MASS AND LUMP, RIGHT LOWER LIMB: ICD-10-CM

## 2022-08-18 DIAGNOSIS — Z86.718 PERSONAL HISTORY OF OTHER VENOUS THROMBOSIS AND EMBOLISM: ICD-10-CM

## 2022-08-18 DIAGNOSIS — G47.00 INSOMNIA, UNSPECIFIED: ICD-10-CM

## 2022-08-18 DIAGNOSIS — Z88.8 ALLERGY STATUS TO OTHER DRUGS, MEDICAMENTS AND BIOLOGICAL SUBSTANCES STATUS: ICD-10-CM

## 2022-08-18 DIAGNOSIS — Z79.01 LONG TERM (CURRENT) USE OF ANTICOAGULANTS: ICD-10-CM

## 2022-08-18 DIAGNOSIS — Z91.040 LATEX ALLERGY STATUS: ICD-10-CM

## 2022-08-18 DIAGNOSIS — E03.9 HYPOTHYROIDISM, UNSPECIFIED: ICD-10-CM

## 2022-08-18 DIAGNOSIS — Z91.018 ALLERGY TO OTHER FOODS: ICD-10-CM

## 2022-08-18 DIAGNOSIS — Z88.0 ALLERGY STATUS TO PENICILLIN: ICD-10-CM

## 2022-08-18 DIAGNOSIS — Z88.6 ALLERGY STATUS TO ANALGESIC AGENT: ICD-10-CM

## 2022-08-18 DIAGNOSIS — J45.909 UNSPECIFIED ASTHMA, UNCOMPLICATED: ICD-10-CM

## 2022-08-18 DIAGNOSIS — M79.89 OTHER SPECIFIED SOFT TISSUE DISORDERS: ICD-10-CM

## 2022-08-18 DIAGNOSIS — K21.9 GASTRO-ESOPHAGEAL REFLUX DISEASE WITHOUT ESOPHAGITIS: ICD-10-CM

## 2022-08-18 DIAGNOSIS — F31.9 BIPOLAR DISORDER, UNSPECIFIED: ICD-10-CM

## 2022-08-18 LAB
ALBUMIN SERPL ELPH-MCNC: 3.8 G/DL — SIGNIFICANT CHANGE UP (ref 3.3–5)
ALP SERPL-CCNC: 53 U/L — SIGNIFICANT CHANGE UP (ref 40–120)
ALT FLD-CCNC: 26 U/L — SIGNIFICANT CHANGE UP (ref 12–78)
ANION GAP SERPL CALC-SCNC: 6 MMOL/L — SIGNIFICANT CHANGE UP (ref 5–17)
APPEARANCE UR: CLEAR — SIGNIFICANT CHANGE UP
AST SERPL-CCNC: 24 U/L — SIGNIFICANT CHANGE UP (ref 15–37)
BACTERIA # UR AUTO: NEGATIVE — SIGNIFICANT CHANGE UP
BASOPHILS # BLD AUTO: 0.01 K/UL — SIGNIFICANT CHANGE UP (ref 0–0.2)
BASOPHILS NFR BLD AUTO: 0.2 % — SIGNIFICANT CHANGE UP (ref 0–2)
BILIRUB SERPL-MCNC: 0.3 MG/DL — SIGNIFICANT CHANGE UP (ref 0.2–1.2)
BILIRUB UR-MCNC: NEGATIVE — SIGNIFICANT CHANGE UP
BUN SERPL-MCNC: 10 MG/DL — SIGNIFICANT CHANGE UP (ref 7–23)
CALCIUM SERPL-MCNC: 9.1 MG/DL — SIGNIFICANT CHANGE UP (ref 8.5–10.1)
CHLORIDE SERPL-SCNC: 107 MMOL/L — SIGNIFICANT CHANGE UP (ref 96–108)
CO2 SERPL-SCNC: 26 MMOL/L — SIGNIFICANT CHANGE UP (ref 22–31)
COLOR SPEC: YELLOW — SIGNIFICANT CHANGE UP
CREAT SERPL-MCNC: 0.83 MG/DL — SIGNIFICANT CHANGE UP (ref 0.5–1.3)
DIFF PNL FLD: NEGATIVE — SIGNIFICANT CHANGE UP
EGFR: 94 ML/MIN/1.73M2 — SIGNIFICANT CHANGE UP
EOSINOPHIL # BLD AUTO: 0.01 K/UL — SIGNIFICANT CHANGE UP (ref 0–0.5)
EOSINOPHIL NFR BLD AUTO: 0.2 % — SIGNIFICANT CHANGE UP (ref 0–6)
EPI CELLS # UR: SIGNIFICANT CHANGE UP
GLUCOSE SERPL-MCNC: 85 MG/DL — SIGNIFICANT CHANGE UP (ref 70–99)
GLUCOSE UR QL: NEGATIVE MG/DL — SIGNIFICANT CHANGE UP
HCG SERPL-ACNC: <1 MIU/ML — SIGNIFICANT CHANGE UP
HCT VFR BLD CALC: 34.7 % — SIGNIFICANT CHANGE UP (ref 34.5–45)
HGB BLD-MCNC: 11.1 G/DL — LOW (ref 11.5–15.5)
IMM GRANULOCYTES NFR BLD AUTO: 0.2 % — SIGNIFICANT CHANGE UP (ref 0–1.5)
KETONES UR-MCNC: NEGATIVE — SIGNIFICANT CHANGE UP
LACTATE SERPL-SCNC: 1 MMOL/L — SIGNIFICANT CHANGE UP (ref 0.7–2)
LEUKOCYTE ESTERASE UR-ACNC: NEGATIVE — SIGNIFICANT CHANGE UP
LIDOCAIN IGE QN: 72 U/L — LOW (ref 73–393)
LYMPHOCYTES # BLD AUTO: 2.18 K/UL — SIGNIFICANT CHANGE UP (ref 1–3.3)
LYMPHOCYTES # BLD AUTO: 45.9 % — HIGH (ref 13–44)
MCHC RBC-ENTMCNC: 28.6 PG — SIGNIFICANT CHANGE UP (ref 27–34)
MCHC RBC-ENTMCNC: 32 G/DL — SIGNIFICANT CHANGE UP (ref 32–36)
MCV RBC AUTO: 89.4 FL — SIGNIFICANT CHANGE UP (ref 80–100)
MONOCYTES # BLD AUTO: 0.35 K/UL — SIGNIFICANT CHANGE UP (ref 0–0.9)
MONOCYTES NFR BLD AUTO: 7.4 % — SIGNIFICANT CHANGE UP (ref 2–14)
NEUTROPHILS # BLD AUTO: 2.19 K/UL — SIGNIFICANT CHANGE UP (ref 1.8–7.4)
NEUTROPHILS NFR BLD AUTO: 46.1 % — SIGNIFICANT CHANGE UP (ref 43–77)
NITRITE UR-MCNC: NEGATIVE — SIGNIFICANT CHANGE UP
NRBC # BLD: 0 /100 WBCS — SIGNIFICANT CHANGE UP (ref 0–0)
PH UR: 6 — SIGNIFICANT CHANGE UP (ref 5–8)
PLATELET # BLD AUTO: 172 K/UL — SIGNIFICANT CHANGE UP (ref 150–400)
POTASSIUM SERPL-MCNC: 4.6 MMOL/L — SIGNIFICANT CHANGE UP (ref 3.5–5.3)
POTASSIUM SERPL-SCNC: 4.6 MMOL/L — SIGNIFICANT CHANGE UP (ref 3.5–5.3)
PROT SERPL-MCNC: 7.9 GM/DL — SIGNIFICANT CHANGE UP (ref 6–8.3)
PROT UR-MCNC: 30 MG/DL
RBC # BLD: 3.88 M/UL — SIGNIFICANT CHANGE UP (ref 3.8–5.2)
RBC # FLD: 14.2 % — SIGNIFICANT CHANGE UP (ref 10.3–14.5)
RBC CASTS # UR COMP ASSIST: NEGATIVE /HPF — SIGNIFICANT CHANGE UP (ref 0–4)
SODIUM SERPL-SCNC: 139 MMOL/L — SIGNIFICANT CHANGE UP (ref 135–145)
SP GR SPEC: 1.01 — SIGNIFICANT CHANGE UP (ref 1.01–1.02)
UROBILINOGEN FLD QL: NEGATIVE MG/DL — SIGNIFICANT CHANGE UP
WBC # BLD: 4.75 K/UL — SIGNIFICANT CHANGE UP (ref 3.8–10.5)
WBC # FLD AUTO: 4.75 K/UL — SIGNIFICANT CHANGE UP (ref 3.8–10.5)
WBC UR QL: SIGNIFICANT CHANGE UP

## 2022-08-18 PROCEDURE — 99285 EMERGENCY DEPT VISIT HI MDM: CPT

## 2022-08-18 RX ORDER — TRAMADOL HYDROCHLORIDE 50 MG/1
50 TABLET ORAL ONCE
Refills: 0 | Status: DISCONTINUED | OUTPATIENT
Start: 2022-08-18 | End: 2022-08-18

## 2022-08-18 RX ORDER — SODIUM CHLORIDE 9 MG/ML
1000 INJECTION INTRAMUSCULAR; INTRAVENOUS; SUBCUTANEOUS ONCE
Refills: 0 | Status: COMPLETED | OUTPATIENT
Start: 2022-08-18 | End: 2022-08-18

## 2022-08-18 RX ORDER — ACETAMINOPHEN 500 MG
650 TABLET ORAL ONCE
Refills: 0 | Status: COMPLETED | OUTPATIENT
Start: 2022-08-18 | End: 2022-08-18

## 2022-08-18 RX ADMIN — SODIUM CHLORIDE 1000 MILLILITER(S): 9 INJECTION INTRAMUSCULAR; INTRAVENOUS; SUBCUTANEOUS at 20:16

## 2022-08-18 NOTE — ED ADULT NURSE NOTE - OBJECTIVE STATEMENT
Pt denies abd pain states has a lump in her left upper arm and has a dermatologist wo says it needs to be drained pt states needs sono 1st. No lump noted Dr Farmer aware to eval pt

## 2022-08-18 NOTE — ED ADULT NURSE REASSESSMENT NOTE - NS ED NURSE REASSESS COMMENT FT1
Unable to get IV or labs Dr Farmer aware pt states Dr usually gets in neck, Per Dr have another R.N try. Urine obtained

## 2022-08-19 VITALS
OXYGEN SATURATION: 99 % | HEART RATE: 91 BPM | RESPIRATION RATE: 18 BRPM | DIASTOLIC BLOOD PRESSURE: 89 MMHG | SYSTOLIC BLOOD PRESSURE: 128 MMHG | TEMPERATURE: 98 F

## 2022-08-19 PROCEDURE — 76882 US LMTD JT/FCL EVL NVASC XTR: CPT | Mod: 26,LT

## 2022-08-19 RX ORDER — OXYCODONE AND ACETAMINOPHEN 5; 325 MG/1; MG/1
1 TABLET ORAL ONCE
Refills: 0 | Status: DISCONTINUED | OUTPATIENT
Start: 2022-08-19 | End: 2022-08-19

## 2022-08-19 RX ADMIN — OXYCODONE AND ACETAMINOPHEN 1 TABLET(S): 5; 325 TABLET ORAL at 03:49

## 2022-08-19 NOTE — ED PROVIDER NOTE - SKIN, MLM
Skin normal color for race, warm, dry and intact. No evidence of rash +areas of firmness palpated in the rue and lle +superficial thrombosis?

## 2022-08-19 NOTE — ED PROVIDER NOTE - CCCP TRG CHIEF CMPLNT
Discussed MOA of symptoms and reducing pressure to the groin  Pt endorsed understanding       abdominal pain

## 2022-08-19 NOTE — ED PROVIDER NOTE - PATIENT PORTAL LINK FT
You can access the FollowMyHealth Patient Portal offered by [facility] by registering at the following website: [email or web address]. By joining [facility]’s FollowMyHealth portal, you will also be able to view your health information using other applications (apps) compatible with our system.

## 2022-08-19 NOTE — ED PROVIDER NOTE - PROGRESS NOTE DETAILS
Prateek: s/o by dr casas to f/u sono of extremity. pt has a palpable lump and was sent from group home for eval. pt had us dvt study which was neg prior. pending sono and dc back to group home. pt also showing sign of drug seeking behavior. s/o dr yeh Tanner DO: Received sign out from Dr. Chandra.  US reported No fluid collection or mass in the left upper arm.    Indeterminant 1.1 cm hypoechoic nodule in the upper outer right thigh in   the area of palpable abnormality as indicated by the patient. Although   this could represent a lipoma, consider further evaluation with outpatient basis MRI.  I explained finding to pt and informed that I will provide contact for medical f/u.  Pt left ED  prior to ED discharge papers.  pt was A&Ox 3. Tanner DO: Received sign out from Dr. Chandra.  US reported No fluid collection or mass in the left upper arm.    Indeterminant 1.1 cm hypoechoic nodule in the upper outer right thigh in   the area of palpable abnormality as indicated by the patient. Although   this could represent a lipoma, consider further evaluation with outpatient basis MRI.  I explained finding to pt and informed that I will provide contact for medical f/u.  Pt eloped ED prior to my assessment of her leg, discharge instructions and arranged transport.  pt was A&Ox 3. Pt returned for discharge papers. Pt is A&O x 3 and still refusing transportation.  Nursing supervisor Yayo armenta. In order to assure transportation back to group home,, will provide pt with 1 dose percocet.  Nursing supervisor arranging transport.

## 2022-08-19 NOTE — ED PROVIDER NOTE - OBJECTIVE STATEMENT
37 year old female presents c/o rue and lle pain, pt was seen yesterday, presented for abdominal pain and vomiting, today pt reports areas of swelling in her RUE and LLE, pt had similar symptoms in the past, was admitted for that time for infection (-) fevers or chills (-) trauma

## 2022-08-19 NOTE — ED PROVIDER NOTE - NSFOLLOWUPINSTRUCTIONS_ED_ALL_ED_FT
Pt eloped ED prior to my assessment of her leg, discharge instructions and arranged transport. Leg pain may be caused by a variety of health conditions. Your tests did not show any broken bones or blood clots.    DISCHARGE INSTRUCTIONS:    Return to the emergency department if:   •You have a fever.      •Your leg starts to swell.      •Your leg pain gets worse.      •You have numbness or tingling in your leg or toes.      •You cannot put any weight on or move your leg.      Contact your healthcare provider if:   •Your pain does not decrease, even after treatment.      •You have questions or concerns about your condition or care.      Medicines:   •NSAIDs, such as ibuprofen, help decrease swelling, pain, and fever. This medicine is available with or without a doctor's order. NSAIDs can cause stomach bleeding or kidney problems in certain people. If you take blood thinner medicine, always ask your healthcare provider if NSAIDs are safe for you. Always read the medicine label and follow directions.      •Take your medicine as directed. Contact your healthcare provider if you think your medicine is not helping or if you have side effects. Tell him or her if you are allergic to any medicine. Keep a list of the medicines, vitamins, and herbs you take. Include the amounts, and when and why you take them. Bring the list or the pill bottles to follow-up visits. Carry your medicine list with you in case of an emergency.      Follow up with your healthcare provider as directed: You may need more tests to find the cause of your leg pain. You may need to see an orthopedic specialist or a physical therapist. Write down your questions so you remember to ask them during your visits.    Manage your leg pain:   •Rest your injured leg so that it can heal. You may need an immobilizer, brace, or splint to limit the movement of your leg. You may need to avoid putting any weight on your leg for at least 48 hours. Return to normal activities as directed.      •Ice the injury for 20 minutes every 4 hours for up to 24 hours, or as directed. Use an ice pack, or put crushed ice in a plastic bag. Cover it with a towel to protect your skin. Ice helps prevent tissue damage and decreases swelling and pain.      •Elevate your injured leg above the level of your heart as often as you can. This will help decrease swelling and pain. If possible, prop your leg on pillows or blankets to keep the area elevated comfortably.       •Use assistive devices as directed. You may need to use a cane or crutches. Assistive devices help decrease pain and pressure on your leg when you walk. Ask your healthcare provider for more information about assistive devices and how to use them correctly.      •Maintain a healthy weight. Extra body weight can cause pressure and pain in your hip, knee, and ankle joints. Ask your healthcare provider how much you should weigh. Ask him to help you create a weight loss plan if you are overweight.

## 2022-08-19 NOTE — ED PROVIDER NOTE - CARE PROVIDER_API CALL
Rohan Braxton E  INTERNAL MEDICINE  [address] [place] [address]  Phone: [phone]  Fax: [phone]  Follow Up Time:

## 2022-08-19 NOTE — ED PROVIDER NOTE - PSYCHIATRIC, MLM
Pt leaves message wondering if his strep culture was back yet.  Pt informed it is still in process.      Alert and oriented to person, place, time/situation. normal mood and affect. no apparent risk to self or others.

## 2022-08-22 ENCOUNTER — APPOINTMENT (OUTPATIENT)
Dept: CV DIAGNOSITCS | Facility: HOSPITAL | Age: 37
End: 2022-08-22

## 2022-08-22 ENCOUNTER — OUTPATIENT (OUTPATIENT)
Dept: OUTPATIENT SERVICES | Facility: HOSPITAL | Age: 37
LOS: 1 days | End: 2022-08-22

## 2022-08-22 DIAGNOSIS — R00.2 PALPITATIONS: ICD-10-CM

## 2022-08-22 PROCEDURE — 93306 TTE W/DOPPLER COMPLETE: CPT | Mod: 26

## 2022-08-28 ENCOUNTER — EMERGENCY (EMERGENCY)
Facility: HOSPITAL | Age: 37
LOS: 1 days | Discharge: HOME | End: 2022-08-28
Attending: EMERGENCY MEDICINE | Admitting: EMERGENCY MEDICINE

## 2022-08-28 VITALS
TEMPERATURE: 98 F | DIASTOLIC BLOOD PRESSURE: 81 MMHG | RESPIRATION RATE: 15 BRPM | HEART RATE: 109 BPM | HEIGHT: 68 IN | SYSTOLIC BLOOD PRESSURE: 133 MMHG

## 2022-08-28 VITALS
OXYGEN SATURATION: 100 % | SYSTOLIC BLOOD PRESSURE: 119 MMHG | TEMPERATURE: 98 F | DIASTOLIC BLOOD PRESSURE: 78 MMHG | HEART RATE: 98 BPM | RESPIRATION RATE: 16 BRPM

## 2022-08-28 LAB
ALBUMIN SERPL ELPH-MCNC: 4.4 G/DL — SIGNIFICANT CHANGE UP (ref 3.3–5)
ALP SERPL-CCNC: 59 U/L — SIGNIFICANT CHANGE UP (ref 40–120)
ALT FLD-CCNC: 17 U/L — SIGNIFICANT CHANGE UP (ref 4–33)
ANION GAP SERPL CALC-SCNC: 12 MMOL/L — SIGNIFICANT CHANGE UP (ref 7–14)
APPEARANCE UR: ABNORMAL
AST SERPL-CCNC: 20 U/L — SIGNIFICANT CHANGE UP (ref 4–32)
BACTERIA # UR AUTO: ABNORMAL
BASOPHILS # BLD AUTO: 0.02 K/UL — SIGNIFICANT CHANGE UP (ref 0–0.2)
BASOPHILS NFR BLD AUTO: 0.3 % — SIGNIFICANT CHANGE UP (ref 0–2)
BILIRUB SERPL-MCNC: <0.2 MG/DL — SIGNIFICANT CHANGE UP (ref 0.2–1.2)
BILIRUB UR-MCNC: NEGATIVE — SIGNIFICANT CHANGE UP
BUN SERPL-MCNC: 10 MG/DL — SIGNIFICANT CHANGE UP (ref 7–23)
CALCIUM SERPL-MCNC: 9.7 MG/DL — SIGNIFICANT CHANGE UP (ref 8.4–10.5)
CHLORIDE SERPL-SCNC: 107 MMOL/L — SIGNIFICANT CHANGE UP (ref 98–107)
CO2 SERPL-SCNC: 23 MMOL/L — SIGNIFICANT CHANGE UP (ref 22–31)
COLOR SPEC: SIGNIFICANT CHANGE UP
CREAT SERPL-MCNC: 0.82 MG/DL — SIGNIFICANT CHANGE UP (ref 0.5–1.3)
DIFF PNL FLD: NEGATIVE — SIGNIFICANT CHANGE UP
EGFR: 96 ML/MIN/1.73M2 — SIGNIFICANT CHANGE UP
EOSINOPHIL # BLD AUTO: 0.04 K/UL — SIGNIFICANT CHANGE UP (ref 0–0.5)
EOSINOPHIL NFR BLD AUTO: 0.7 % — SIGNIFICANT CHANGE UP (ref 0–6)
EPI CELLS # UR: 19 /HPF — HIGH (ref 0–5)
GLUCOSE SERPL-MCNC: 90 MG/DL — SIGNIFICANT CHANGE UP (ref 70–99)
GLUCOSE UR QL: NEGATIVE — SIGNIFICANT CHANGE UP
HCT VFR BLD CALC: 33.2 % — LOW (ref 34.5–45)
HGB BLD-MCNC: 10.8 G/DL — LOW (ref 11.5–15.5)
HYALINE CASTS # UR AUTO: 2 /LPF — SIGNIFICANT CHANGE UP (ref 0–7)
IANC: 3.11 K/UL — SIGNIFICANT CHANGE UP (ref 1.8–7.4)
IMM GRANULOCYTES NFR BLD AUTO: 0.2 % — SIGNIFICANT CHANGE UP (ref 0–1.5)
KETONES UR-MCNC: NEGATIVE — SIGNIFICANT CHANGE UP
LEUKOCYTE ESTERASE UR-ACNC: NEGATIVE — SIGNIFICANT CHANGE UP
LYMPHOCYTES # BLD AUTO: 2.24 K/UL — SIGNIFICANT CHANGE UP (ref 1–3.3)
LYMPHOCYTES # BLD AUTO: 37.7 % — SIGNIFICANT CHANGE UP (ref 13–44)
MCHC RBC-ENTMCNC: 29.7 PG — SIGNIFICANT CHANGE UP (ref 27–34)
MCHC RBC-ENTMCNC: 32.5 GM/DL — SIGNIFICANT CHANGE UP (ref 32–36)
MCV RBC AUTO: 91.2 FL — SIGNIFICANT CHANGE UP (ref 80–100)
MONOCYTES # BLD AUTO: 0.52 K/UL — SIGNIFICANT CHANGE UP (ref 0–0.9)
MONOCYTES NFR BLD AUTO: 8.8 % — SIGNIFICANT CHANGE UP (ref 2–14)
NEUTROPHILS # BLD AUTO: 3.11 K/UL — SIGNIFICANT CHANGE UP (ref 1.8–7.4)
NEUTROPHILS NFR BLD AUTO: 52.3 % — SIGNIFICANT CHANGE UP (ref 43–77)
NITRITE UR-MCNC: NEGATIVE — SIGNIFICANT CHANGE UP
NRBC # BLD: 0 /100 WBCS — SIGNIFICANT CHANGE UP (ref 0–0)
NRBC # FLD: 0 K/UL — SIGNIFICANT CHANGE UP (ref 0–0)
PH UR: 6.5 — SIGNIFICANT CHANGE UP (ref 5–8)
PLATELET # BLD AUTO: 174 K/UL — SIGNIFICANT CHANGE UP (ref 150–400)
POTASSIUM SERPL-MCNC: 4.8 MMOL/L — SIGNIFICANT CHANGE UP (ref 3.5–5.3)
POTASSIUM SERPL-SCNC: 4.8 MMOL/L — SIGNIFICANT CHANGE UP (ref 3.5–5.3)
PROT SERPL-MCNC: 7.6 G/DL — SIGNIFICANT CHANGE UP (ref 6–8.3)
PROT UR-MCNC: NEGATIVE — SIGNIFICANT CHANGE UP
RBC # BLD: 3.64 M/UL — LOW (ref 3.8–5.2)
RBC # FLD: 14.3 % — SIGNIFICANT CHANGE UP (ref 10.3–14.5)
RBC CASTS # UR COMP ASSIST: 2 /HPF — SIGNIFICANT CHANGE UP (ref 0–4)
SODIUM SERPL-SCNC: 142 MMOL/L — SIGNIFICANT CHANGE UP (ref 135–145)
SP GR SPEC: 1.01 — LOW (ref 1.01–1.05)
UROBILINOGEN FLD QL: SIGNIFICANT CHANGE UP
WBC # BLD: 5.94 K/UL — SIGNIFICANT CHANGE UP (ref 3.8–10.5)
WBC # FLD AUTO: 5.94 K/UL — SIGNIFICANT CHANGE UP (ref 3.8–10.5)
WBC UR QL: 9 /HPF — HIGH (ref 0–5)

## 2022-08-28 PROCEDURE — 99285 EMERGENCY DEPT VISIT HI MDM: CPT

## 2022-08-28 PROCEDURE — 76830 TRANSVAGINAL US NON-OB: CPT | Mod: 26

## 2022-08-28 RX ORDER — DIPHENHYDRAMINE HCL 50 MG
50 CAPSULE ORAL ONCE
Refills: 0 | Status: COMPLETED | OUTPATIENT
Start: 2022-08-28 | End: 2022-08-28

## 2022-08-28 RX ORDER — METOCLOPRAMIDE HCL 10 MG
10 TABLET ORAL ONCE
Refills: 0 | Status: COMPLETED | OUTPATIENT
Start: 2022-08-28 | End: 2022-08-28

## 2022-08-28 RX ADMIN — Medication 50 MILLIGRAM(S): at 07:08

## 2022-08-28 RX ADMIN — Medication 20 MILLIGRAM(S): at 05:07

## 2022-08-28 RX ADMIN — Medication 10 MILLIGRAM(S): at 06:02

## 2022-08-28 NOTE — ED PROVIDER NOTE - CLINICAL SUMMARY MEDICAL DECISION MAKING FREE TEXT BOX
36yo F w/ pmh/PPH as above, p/w 1wk of lower abd/suprapubic/left adnexal pain, a/w chills, nausea and urinary frequency. Pt had CT scan 2wks ago after similar complaints, significant for ovarian cyst, but no diverticulosis/litis/colitis or other abd pathology. no indication for rpt CT AP at this time. Ddx includes uti vs ovarian cyst vs endometriosis flare. labs, imaging, meds, reassess.

## 2022-08-28 NOTE — ED ADULT TRIAGE NOTE - BRAND OF COVID-19 VACCINATION
Can you please call daughter and ask her to come in with her mother for her August appointment. We would like to review neuropsych testing and make sure that everybody is on the same page for the patient's safety.
Sanjiv

## 2022-08-28 NOTE — PROVIDER CONTACT NOTE (OTHER) - ACTION/TREATMENT ORDERED:
Contacted [facility] ([phone]), no available cars at this time.  Contacted [facility], spoke with Cordelia and set up transport with [facility] [employer] ([phone]).

## 2022-08-28 NOTE — PROVIDER CONTACT NOTE (OTHER) - RECOMMENDATIONS
Contacted [facility] ([phone]). Taxi transport set up with [facility], invoice# [id number].

## 2022-08-28 NOTE — ED PROVIDER NOTE - PHYSICAL EXAMINATION
Gen: Alert, NAD  Head: NC, AT,  EOMI, normal lids/conjunctiva  ENT:  normal hearing, patent oropharynx without erythema/exudate  Neck: +supple, no tenderness/meningismus/JVD, +Trachea midline  Chest: no chest wall tenderness, equal chest rise  Pulm: Bilateral BS, normal resp effort, no wheeze/stridor/retractions  CV: tachy, no M/R/G, +dist pulses  Abd: +BS, soft, ND, +ttp suprapubic and LLQ abd/Left adnexa  Rectal: deferred  Mskel: no edema/erythema/cyanosis  Skin: no rash  Neuro: AAOx3

## 2022-08-28 NOTE — ED PROVIDER NOTE - PROGRESS NOTE DETAILS
Luke PINA (PGY-3)  pt's pain improved, states she still has some but feels better. resting comfortably in bed. labs nonactionable, TVUS unchanged with known cyst, ua contaminated but no clear signs of infxn. [facility] Luke PINA (PGY-3)  pt's pain improved, states she still has some but feels better. resting comfortably in bed. labs nonactionable, TVUS unchanged with known cyst, ua contaminated but no clear signs of infxn. [facility]

## 2022-08-28 NOTE — ED PROVIDER NOTE - OBJECTIVE STATEMENT
Pertinent PMH/PSH/FHx/SHx and Review of Systems contained within:  36yo F w/ pmh inclusive of obesity, Factor V leiden deficiency a/w dvt/pe, on eliquis, ?seizures vs pseudoseizures on vimpat, and endometriosis; PPH of depression and bipolar d/o, p/w b/l lower abd pain x 1wk, constant, a/w nausea but not emesis, urinary frequency but no dysuria, chills but no fever. On ROS, reports 1 BM a wk ago that was blood tinged, otherwise normal BMs since. Denies etoh, smoking or illicit drug use. No recent falls or trauma.    No photophobia/eye pain/changes in vision, No ear pain/sore throat/dysphagia, No chest pain/palpitations, no SOB/cough/wheeze/stridor, No neck/back pain, no rash, no new focal neuro symptoms.

## 2022-08-28 NOTE — ED ADULT TRIAGE NOTE - CHIEF COMPLAINT QUOTE
alert from group home [facility] c/o low abd pain x 1 week with nausea no vomiting or diarrhea PMHx endometriosis seizures hypothyroid HTN MDD bipolar schizophrenia

## 2022-08-28 NOTE — PROVIDER CONTACT NOTE (OTHER) - ASSESSMENT
Contacted patient residence ([facility] [place] - [phone]), spoke with Robert Brito, agreed with plan for discharge and states patient is ok to travel INDEPENDENTLY via taxi.

## 2022-08-28 NOTE — ED PROVIDER NOTE - PATIENT PORTAL LINK FT
You can access the FollowMyHealth Patient Portal offered by [facility] by registering at the following website: [email or web address]. By joining [facility]’s FollowMyHealth portal, you will also be able to view your health information using other applications (apps) compatible with our system.

## 2022-08-28 NOTE — ED PROVIDER NOTE - NSFOLLOWUPINSTRUCTIONS_ED_ALL_ED_FT
19:15 You were seen today in the emergency room for abdominal pain. Although the testing done today indicates that your pain is not from an acute emergency, your pain could still represent a more serious problem. Most commonly, the pain you are having is likely not something serious and will resolve in a few days, however testing was done today based on the symptoms that you currently have; so if you develop new or worsening symptoms this could be a sign of a problem that was not tested for and means you should come back to the emergency room or see your doctor urgently.     You need to follow up with your doctor in the next 48-72 hours.     If you develop any new or worsening symptoms you need to return immediately to the emergency department. If you experience any of the following please come right back to the emergency room: severe nausea and vomiting with inability to tolerate eating, severe worsening of your pain, a new fever, new bleeding in stool or vomit, confusion, new numbness or weakness, passing out.

## 2022-08-29 LAB
CULTURE RESULTS: SIGNIFICANT CHANGE UP
SPECIMEN SOURCE: SIGNIFICANT CHANGE UP

## 2022-08-31 ENCOUNTER — NON-APPOINTMENT (OUTPATIENT)
Age: 37
End: 2022-08-31

## 2022-08-31 RX ORDER — QUETIAPINE FUMARATE 200 MG/1
1 TABLET, FILM COATED ORAL
Qty: 0 | Refills: 0 | DISCHARGE

## 2022-08-31 RX ORDER — LANOLIN/MINERAL OIL
0 LOTION (ML) TOPICAL
Qty: 0 | Refills: 0 | DISCHARGE

## 2022-08-31 RX ORDER — CETIRIZINE HYDROCHLORIDE 10 MG/1
1 TABLET ORAL
Qty: 0 | Refills: 0 | DISCHARGE

## 2022-08-31 RX ORDER — RAMELTEON 8 MG
1 TABLET ORAL
Qty: 0 | Refills: 0 | DISCHARGE

## 2022-08-31 RX ORDER — DEXTROMETHORPHAN/PSEUDOEPHED 7.5-15MG/5
0 SYRUP ORAL
Qty: 0 | Refills: 0 | DISCHARGE

## 2022-08-31 RX ORDER — DIVALPROEX SODIUM 500 MG/1
3 TABLET, DELAYED RELEASE ORAL
Qty: 0 | Refills: 0 | DISCHARGE

## 2022-08-31 RX ORDER — TIZANIDINE 4 MG/1
2 TABLET ORAL
Qty: 0 | Refills: 0 | DISCHARGE

## 2022-08-31 RX ORDER — LANOLIN ALCOHOL/MO/W.PET/CERES
1 CREAM (GRAM) TOPICAL
Qty: 0 | Refills: 0 | DISCHARGE

## 2022-08-31 NOTE — ED POST DISCHARGE NOTE - RESULT SUMMARY
culture grew 3 or more types of organisms  which indicate collection contamination, consider recollection only if clinically indicated. Patient contacted discussed with patient UCX result. . Patient  denies any urinary symptoms. Discussed with patient to follow up with MD. Discussed with patient need to return to ED if symptoms don't continue to improve or recur or develops any new or worsening symptoms that are of concern.

## 2022-09-11 ENCOUNTER — INPATIENT (INPATIENT)
Facility: HOSPITAL | Age: 37
LOS: 0 days | Discharge: HOME | End: 2022-09-12
Attending: HOSPITALIST | Admitting: HOSPITALIST

## 2022-09-11 VITALS
OXYGEN SATURATION: 96 % | RESPIRATION RATE: 16 BRPM | WEIGHT: 270.07 LBS | TEMPERATURE: 98 F | DIASTOLIC BLOOD PRESSURE: 61 MMHG | SYSTOLIC BLOOD PRESSURE: 78 MMHG | HEART RATE: 94 BPM | HEIGHT: 68 IN

## 2022-09-11 PROBLEM — E11.9 TYPE 2 DIABETES MELLITUS WITHOUT COMPLICATIONS: Chronic | Status: ACTIVE | Noted: 2022-04-19

## 2022-09-11 PROBLEM — I10 ESSENTIAL (PRIMARY) HYPERTENSION: Chronic | Status: ACTIVE | Noted: 2022-04-19

## 2022-09-11 LAB
ALBUMIN SERPL ELPH-MCNC: 3.7 G/DL — SIGNIFICANT CHANGE UP (ref 3.3–5)
ALP SERPL-CCNC: 60 U/L — SIGNIFICANT CHANGE UP (ref 40–120)
ALT FLD-CCNC: 26 U/L — SIGNIFICANT CHANGE UP (ref 12–78)
ANION GAP SERPL CALC-SCNC: 7 MMOL/L — SIGNIFICANT CHANGE UP (ref 5–17)
APTT BLD: 29.1 SEC — SIGNIFICANT CHANGE UP (ref 27.5–35.5)
AST SERPL-CCNC: 15 U/L — SIGNIFICANT CHANGE UP (ref 15–37)
BASOPHILS # BLD AUTO: 0.02 K/UL — SIGNIFICANT CHANGE UP (ref 0–0.2)
BASOPHILS NFR BLD AUTO: 0.4 % — SIGNIFICANT CHANGE UP (ref 0–2)
BILIRUB SERPL-MCNC: 0.2 MG/DL — SIGNIFICANT CHANGE UP (ref 0.2–1.2)
BUN SERPL-MCNC: 9 MG/DL — SIGNIFICANT CHANGE UP (ref 7–23)
CALCIUM SERPL-MCNC: 8.9 MG/DL — SIGNIFICANT CHANGE UP (ref 8.5–10.1)
CHLORIDE SERPL-SCNC: 112 MMOL/L — HIGH (ref 96–108)
CO2 SERPL-SCNC: 22 MMOL/L — SIGNIFICANT CHANGE UP (ref 22–31)
CREAT SERPL-MCNC: 0.81 MG/DL — SIGNIFICANT CHANGE UP (ref 0.5–1.3)
D DIMER BLD IA.RAPID-MCNC: 252 NG/ML DDU — HIGH
EGFR: 97 ML/MIN/1.73M2 — SIGNIFICANT CHANGE UP
EOSINOPHIL # BLD AUTO: 0.02 K/UL — SIGNIFICANT CHANGE UP (ref 0–0.5)
EOSINOPHIL NFR BLD AUTO: 0.4 % — SIGNIFICANT CHANGE UP (ref 0–6)
FLUAV AG NPH QL: SIGNIFICANT CHANGE UP
FLUBV AG NPH QL: SIGNIFICANT CHANGE UP
GLUCOSE SERPL-MCNC: 111 MG/DL — HIGH (ref 70–99)
HCG SERPL-ACNC: <1 MIU/ML — SIGNIFICANT CHANGE UP
HCT VFR BLD CALC: 34.2 % — LOW (ref 34.5–45)
HGB BLD-MCNC: 11 G/DL — LOW (ref 11.5–15.5)
IMM GRANULOCYTES NFR BLD AUTO: 0.2 % — SIGNIFICANT CHANGE UP (ref 0–1.5)
INR BLD: 1.16 RATIO — SIGNIFICANT CHANGE UP (ref 0.88–1.16)
LACTATE SERPL-SCNC: 1.2 MMOL/L — SIGNIFICANT CHANGE UP (ref 0.7–2)
LYMPHOCYTES # BLD AUTO: 1.71 K/UL — SIGNIFICANT CHANGE UP (ref 1–3.3)
LYMPHOCYTES # BLD AUTO: 35.6 % — SIGNIFICANT CHANGE UP (ref 13–44)
MAGNESIUM SERPL-MCNC: 2.1 MG/DL — SIGNIFICANT CHANGE UP (ref 1.6–2.6)
MCHC RBC-ENTMCNC: 29.1 PG — SIGNIFICANT CHANGE UP (ref 27–34)
MCHC RBC-ENTMCNC: 32.2 G/DL — SIGNIFICANT CHANGE UP (ref 32–36)
MCV RBC AUTO: 90.5 FL — SIGNIFICANT CHANGE UP (ref 80–100)
MONOCYTES # BLD AUTO: 0.37 K/UL — SIGNIFICANT CHANGE UP (ref 0–0.9)
MONOCYTES NFR BLD AUTO: 7.7 % — SIGNIFICANT CHANGE UP (ref 2–14)
NEUTROPHILS # BLD AUTO: 2.68 K/UL — SIGNIFICANT CHANGE UP (ref 1.8–7.4)
NEUTROPHILS NFR BLD AUTO: 55.7 % — SIGNIFICANT CHANGE UP (ref 43–77)
NRBC # BLD: 0 /100 WBCS — SIGNIFICANT CHANGE UP (ref 0–0)
PLATELET # BLD AUTO: 170 K/UL — SIGNIFICANT CHANGE UP (ref 150–400)
POTASSIUM SERPL-MCNC: 4.1 MMOL/L — SIGNIFICANT CHANGE UP (ref 3.5–5.3)
POTASSIUM SERPL-SCNC: 4.1 MMOL/L — SIGNIFICANT CHANGE UP (ref 3.5–5.3)
PROCALCITONIN SERPL-MCNC: 0.03 NG/ML — SIGNIFICANT CHANGE UP (ref 0.02–0.1)
PROT SERPL-MCNC: 7.5 GM/DL — SIGNIFICANT CHANGE UP (ref 6–8.3)
PROTHROM AB SERPL-ACNC: 13.9 SEC — HIGH (ref 10.5–13.4)
RBC # BLD: 3.78 M/UL — LOW (ref 3.8–5.2)
RBC # FLD: 13.9 % — SIGNIFICANT CHANGE UP (ref 10.3–14.5)
SARS-COV-2 RNA SPEC QL NAA+PROBE: SIGNIFICANT CHANGE UP
SODIUM SERPL-SCNC: 141 MMOL/L — SIGNIFICANT CHANGE UP (ref 135–145)
TROPONIN I, HIGH SENSITIVITY RESULT: 4 NG/L — SIGNIFICANT CHANGE UP
TROPONIN I, HIGH SENSITIVITY RESULT: <3 NG/L — SIGNIFICANT CHANGE UP
WBC # BLD: 4.81 K/UL — SIGNIFICANT CHANGE UP (ref 3.8–10.5)
WBC # FLD AUTO: 4.81 K/UL — SIGNIFICANT CHANGE UP (ref 3.8–10.5)

## 2022-09-11 PROCEDURE — 99291 CRITICAL CARE FIRST HOUR: CPT

## 2022-09-11 PROCEDURE — 76700 US EXAM ABDOM COMPLETE: CPT | Mod: 26

## 2022-09-11 PROCEDURE — 76604 US EXAM CHEST: CPT | Mod: 26

## 2022-09-11 PROCEDURE — 71045 X-RAY EXAM CHEST 1 VIEW: CPT | Mod: 26

## 2022-09-11 PROCEDURE — 71275 CT ANGIOGRAPHY CHEST: CPT | Mod: 26,MA

## 2022-09-11 PROCEDURE — 93010 ELECTROCARDIOGRAM REPORT: CPT

## 2022-09-11 PROCEDURE — 99223 1ST HOSP IP/OBS HIGH 75: CPT

## 2022-09-11 RX ORDER — MORPHINE SULFATE 50 MG/1
4 CAPSULE, EXTENDED RELEASE ORAL ONCE
Refills: 0 | Status: DISCONTINUED | OUTPATIENT
Start: 2022-09-11 | End: 2022-09-11

## 2022-09-11 RX ORDER — LANOLIN ALCOHOL/MO/W.PET/CERES
3 CREAM (GRAM) TOPICAL AT BEDTIME
Refills: 0 | Status: DISCONTINUED | OUTPATIENT
Start: 2022-09-11 | End: 2022-09-12

## 2022-09-11 RX ORDER — DIPHENHYDRAMINE HCL 50 MG
25 CAPSULE ORAL ONCE
Refills: 0 | Status: COMPLETED | OUTPATIENT
Start: 2022-09-11 | End: 2022-09-11

## 2022-09-11 RX ORDER — LANOLIN ALCOHOL/MO/W.PET/CERES
2 CREAM (GRAM) TOPICAL
Qty: 0 | Refills: 0 | DISCHARGE

## 2022-09-11 RX ORDER — APIXABAN 2.5 MG/1
5 TABLET, FILM COATED ORAL EVERY 12 HOURS
Refills: 0 | Status: DISCONTINUED | OUTPATIENT
Start: 2022-09-11 | End: 2022-09-12

## 2022-09-11 RX ORDER — LEVOTHYROXINE SODIUM 125 MCG
75 TABLET ORAL DAILY
Refills: 0 | Status: DISCONTINUED | OUTPATIENT
Start: 2022-09-11 | End: 2022-09-12

## 2022-09-11 RX ORDER — OXCARBAZEPINE 300 MG/1
150 TABLET, FILM COATED ORAL
Refills: 0 | Status: DISCONTINUED | OUTPATIENT
Start: 2022-09-11 | End: 2022-09-12

## 2022-09-11 RX ORDER — QUETIAPINE FUMARATE 200 MG/1
50 TABLET, FILM COATED ORAL
Refills: 0 | Status: DISCONTINUED | OUTPATIENT
Start: 2022-09-11 | End: 2022-09-12

## 2022-09-11 RX ORDER — ACETAMINOPHEN 500 MG
975 TABLET ORAL ONCE
Refills: 0 | Status: COMPLETED | OUTPATIENT
Start: 2022-09-11 | End: 2022-09-11

## 2022-09-11 RX ORDER — DIVALPROEX SODIUM 500 MG/1
500 TABLET, DELAYED RELEASE ORAL AT BEDTIME
Refills: 0 | Status: DISCONTINUED | OUTPATIENT
Start: 2022-09-11 | End: 2022-09-12

## 2022-09-11 RX ORDER — SODIUM CHLORIDE 9 MG/ML
1000 INJECTION INTRAMUSCULAR; INTRAVENOUS; SUBCUTANEOUS ONCE
Refills: 0 | Status: COMPLETED | OUTPATIENT
Start: 2022-09-11 | End: 2022-09-11

## 2022-09-11 RX ORDER — ARIPIPRAZOLE 15 MG/1
30 TABLET ORAL DAILY
Refills: 0 | Status: DISCONTINUED | OUTPATIENT
Start: 2022-09-11 | End: 2022-09-12

## 2022-09-11 RX ORDER — APIXABAN 2.5 MG/1
1 TABLET, FILM COATED ORAL
Qty: 0 | Refills: 0 | DISCHARGE

## 2022-09-11 RX ORDER — LORATADINE 10 MG/1
1 TABLET ORAL
Qty: 0 | Refills: 0 | DISCHARGE

## 2022-09-11 RX ORDER — FLUTICASONE PROPIONATE 50 MCG
1 SPRAY, SUSPENSION NASAL
Refills: 0 | Status: DISCONTINUED | OUTPATIENT
Start: 2022-09-11 | End: 2022-09-12

## 2022-09-11 RX ORDER — DIVALPROEX SODIUM 500 MG/1
500 TABLET, DELAYED RELEASE ORAL
Refills: 0 | Status: DISCONTINUED | OUTPATIENT
Start: 2022-09-11 | End: 2022-09-12

## 2022-09-11 RX ORDER — LURASIDONE HYDROCHLORIDE 40 MG/1
40 TABLET ORAL DAILY
Refills: 0 | Status: DISCONTINUED | OUTPATIENT
Start: 2022-09-11 | End: 2022-09-12

## 2022-09-11 RX ORDER — FLUTICASONE PROPIONATE 220 MCG
1 AEROSOL WITH ADAPTER (GRAM) INHALATION
Qty: 0 | Refills: 0 | DISCHARGE

## 2022-09-11 RX ORDER — ACETAMINOPHEN 500 MG
650 TABLET ORAL EVERY 6 HOURS
Refills: 0 | Status: DISCONTINUED | OUTPATIENT
Start: 2022-09-11 | End: 2022-09-12

## 2022-09-11 RX ORDER — HYDROCORTISONE 1 %
1 OINTMENT (GRAM) TOPICAL
Qty: 0 | Refills: 0 | DISCHARGE

## 2022-09-11 RX ORDER — INFLUENZA VIRUS VACCINE 15; 15; 15; 15 UG/.5ML; UG/.5ML; UG/.5ML; UG/.5ML
0.5 SUSPENSION INTRAMUSCULAR ONCE
Refills: 0 | Status: DISCONTINUED | OUTPATIENT
Start: 2022-09-11 | End: 2022-09-12

## 2022-09-11 RX ORDER — DIPHENHYDRAMINE HCL 50 MG
25 CAPSULE ORAL ONCE
Refills: 0 | Status: DISCONTINUED | OUTPATIENT
Start: 2022-09-11 | End: 2022-09-11

## 2022-09-11 RX ORDER — LEVOTHYROXINE SODIUM 125 MCG
1 TABLET ORAL
Qty: 0 | Refills: 0 | DISCHARGE

## 2022-09-11 RX ORDER — DIPHENHYDRAMINE HCL 50 MG
50 CAPSULE ORAL ONCE
Refills: 0 | Status: COMPLETED | OUTPATIENT
Start: 2022-09-11 | End: 2022-09-11

## 2022-09-11 RX ORDER — QUETIAPINE FUMARATE 200 MG/1
200 TABLET, FILM COATED ORAL
Refills: 0 | Status: DISCONTINUED | OUTPATIENT
Start: 2022-09-11 | End: 2022-09-12

## 2022-09-11 RX ORDER — CHLORPROMAZINE HCL 10 MG
50 TABLET ORAL EVERY 6 HOURS
Refills: 0 | Status: DISCONTINUED | OUTPATIENT
Start: 2022-09-11 | End: 2022-09-12

## 2022-09-11 RX ADMIN — MORPHINE SULFATE 4 MILLIGRAM(S): 50 CAPSULE, EXTENDED RELEASE ORAL at 17:15

## 2022-09-11 RX ADMIN — SODIUM CHLORIDE 1000 MILLILITER(S): 9 INJECTION INTRAMUSCULAR; INTRAVENOUS; SUBCUTANEOUS at 14:33

## 2022-09-11 RX ADMIN — MORPHINE SULFATE 4 MILLIGRAM(S): 50 CAPSULE, EXTENDED RELEASE ORAL at 16:56

## 2022-09-11 RX ADMIN — SODIUM CHLORIDE 1000 MILLILITER(S): 9 INJECTION INTRAMUSCULAR; INTRAVENOUS; SUBCUTANEOUS at 13:33

## 2022-09-11 RX ADMIN — QUETIAPINE FUMARATE 200 MILLIGRAM(S): 200 TABLET, FILM COATED ORAL at 19:59

## 2022-09-11 RX ADMIN — MORPHINE SULFATE 4 MILLIGRAM(S): 50 CAPSULE, EXTENDED RELEASE ORAL at 15:33

## 2022-09-11 RX ADMIN — Medication 25 MILLIGRAM(S): at 22:23

## 2022-09-11 RX ADMIN — Medication 650 MILLIGRAM(S): at 23:22

## 2022-09-11 RX ADMIN — Medication 50 MILLIGRAM(S): at 18:47

## 2022-09-11 RX ADMIN — Medication 975 MILLIGRAM(S): at 15:30

## 2022-09-11 RX ADMIN — Medication 25 MILLIGRAM(S): at 14:56

## 2022-09-11 RX ADMIN — Medication 975 MILLIGRAM(S): at 14:57

## 2022-09-11 RX ADMIN — MORPHINE SULFATE 4 MILLIGRAM(S): 50 CAPSULE, EXTENDED RELEASE ORAL at 15:50

## 2022-09-11 NOTE — H&P ADULT - NSHPLABSRESULTS_GEN_ALL_CORE
Labs:                          11.0   4.81  )-----------( 170      ( 11 Sep 2022 14:03 )             34.2     09-11    141  |  112<H>  |  9   ----------------------------<  111<H>  4.1   |  22  |  0.81    Ca    8.9      11 Sep 2022 14:03  Mg     2.1     09-11    TPro  7.5  /  Alb  3.7  /  TBili  0.2  /  DBili  x   /  AST  15  /  ALT  26  /  AlkPhos  60  09-11    LIVER FUNCTIONS - ( 11 Sep 2022 14:03 )  Alb: 3.7 g/dL / Pro: 7.5 gm/dL / ALK PHOS: 60 U/L / ALT: 26 U/L / AST: 15 U/L / GGT: x           PT/INR - ( 11 Sep 2022 14:03 )   PT: 13.9 sec;   INR: 1.16 ratio         PTT - ( 11 Sep 2022 14:03 )  PTT:29.1 sec      Active Medications  MEDICATIONS  (STANDING):    MEDICATIONS  (PRN):  acetaminophen     Tablet .. 650 milliGRAM(s) Oral every 6 hours PRN Temp greater or equal to 38C (100.4F), Mild Pain (1 - 3)  aluminum hydroxide/magnesium hydroxide/simethicone Suspension 30 milliLiter(s) Oral every 4 hours PRN Dyspepsia  melatonin 3 milliGRAM(s) Oral at bedtime PRN Insomnia

## 2022-09-11 NOTE — ED ADULT NURSE NOTE - NSICDXPASTMEDICALHX_GEN_ALL_CORE_FT
PAST MEDICAL HISTORY:  Asthma     Bipolar disorder     Diabetes     DVT (deep venous thrombosis)     Endometriosis     Factor V Leiden     Factor V Leiden     GERD (gastroesophageal reflux disease)     History of DVT in adulthood RLE on eliquis    HTN (hypertension)     Hypothyroid     Insomnia     Seasonal allergies     Seizure

## 2022-09-11 NOTE — ED ADULT TRIAGE NOTE - CHIEF COMPLAINT QUOTE
BIBA as per patient c/o chest pain since this morning. Refused aspirin by EMS in route, states already on eliquis.   Hx: asthma, seizures, cysts, hypothyroidism, depression.

## 2022-09-11 NOTE — H&P ADULT - ASSESSMENT
37F hx factor v leiden on elqiuis, seizures on trileptal pw right side cp and weakness x1 day. no sob. no nausea, vomiting, or fever.        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 History taken by patient. patient reports 10/10 REPRODUCIBLE sharp right sided chest pain , above the breast . Started this afternoon, denies trauma, fever chills or other symptoms. patient denies nausea, vomiting, or fever.    ER course  Vitals stable afebrile Lactic acid negative trops x 2 negative. CTangio chest negative  Patient underwent US of chest to r/o any breast abnormalities: negative. OB was consulted in the ED.        RIGHT SIDED Chest pain: likely MSK due to tendonitis affecting the upper extremity  Ct Chest Noted  US reviewed with ER staff  plan:  OT consult and Pain management    factor V leiden: resume eliquis    Seizures: resume home meds    Patient can likely be managed on observation for now

## 2022-09-11 NOTE — ED ADULT NURSE NOTE - OBJECTIVE STATEMENT
Pt bibems from group home c/o generalized cp since waking up this morning. Pt hypotensive in triage 78/63. Pt denies sob, dizziness, n/v/d, fever/chills, cough, palpitations, headache, back pain, neck pain, abdominal pain, bloody stools, hematuria, numbness/tingling, weakness. Respirations even and unlabored. 12 lead ekg completed. Cardiac and spo2 monitoring in place.

## 2022-09-11 NOTE — ED ADULT NURSE NOTE - NSIMPLEMENTINTERV_GEN_ALL_ED
Implemented All Fall with Harm Risk Interventions:  [place] to call system. Call bell, personal items and telephone within reach. Instruct patient to call for assistance. Room bathroom lighting operational. Non-slip footwear when patient is off stretcher. Physically safe environment: no spills, clutter or unnecessary equipment. Stretcher in lowest position, wheels locked, appropriate side rails in place. Provide visual cue, wrist band, yellow gown, etc. Monitor gait and stability. Monitor for mental status changes and reorient to person, place, and time. Review medications for side effects contributing to fall risk. Reinforce activity limits and safety measures with patient and family. Provide visual clues: red socks.

## 2022-09-11 NOTE — H&P ADULT - NSHPPHYSICALEXAM_GEN_ALL_CORE
Objective:    Vitals:  T(C): 36.7 (09-11-22 @ 14:34), Max: 36.7 (09-11-22 @ 13:06)  HR: 97 (09-11-22 @ 14:34) (94 - 97)  BP: 126/71 (09-11-22 @ 14:34) (78/61 - 126/71)  RR: 18 (09-11-22 @ 14:34) (16 - 18)  SpO2: 100% (09-11-22 @ 14:34) (96% - 100%)    Physical Exam:  General: comfortable, no acute distress, well nourished  HEENT: Atraumatic, no LAD, trachea midline, PERRLA  Cardiovascular: normal s1s2, no murmurs, gallops or fricition rubs  Pulmonary: clear to ausculation Bilaterally, no wheezing , rhonchi  Gastrointestinal: soft non tender non distended, no masses felt, no organomegally  Muscloskeletal: no lower extremity edema, intact bilateral lower extremity pulses  Neurological: CN II-12 intact. No focal weakness  Psychiatrical: normal mood, cooperative  SKIN: no rash, lesions or ulcers Objective:    Vitals:  T(C): 36.7 (09-11-22 @ 14:34), Max: 36.7 (09-11-22 @ 13:06)  HR: 97 (09-11-22 @ 14:34) (94 - 97)  BP: 126/71 (09-11-22 @ 14:34) (78/61 - 126/71)  RR: 18 (09-11-22 @ 14:34) (16 - 18)  SpO2: 100% (09-11-22 @ 14:34) (96% - 100%)    Physical Exam:  General: comfortable, no acute distress, well nourished  HEENT: Atraumatic, no LAD, trachea midline, PERRLA  Cardiovascular: normal s1s2, no murmurs, gallops or fricition rubs  Pulmonary: clear to ausculation Bilaterally, no wheezing , rhonchi  Gastrointestinal: soft non tender non distended, no masses felt, no organomegally  Muscloskeletal: no lower extremity edema, intact bilateral lower extremity pulses ::: CHEST EXAM (( RIGHT SIDE OF CHEST, REPRODUCIBLE TENDERNESS ABOVE BREAST> SHOULDER and SURROUNDING AREAS BENIGN. patient able to rotate arms and elevate arms.     Neurological: CN II-12 intact.  POWER OF RIGHT ARM 3/5 LIMITED BY PAIN.  Psychiatrical: normal mood, cooperative  SKIN: no rash, lesions or ulcers

## 2022-09-11 NOTE — PATIENT PROFILE ADULT - FALL HARM RISK - UNIVERSAL INTERVENTIONS
Bed in lowest position, wheels locked, appropriate side rails in place/Call bell, personal items and telephone in reach/Instruct patient to call for assistance before getting out of bed or chair/Non-slip footwear when patient is out of bed/[place] to call system/Physically safe environment - no spills, clutter or unnecessary equipment/Purposeful Proactive Rounding/Room/bathroom lighting operational, light cord in reach

## 2022-09-11 NOTE — PATIENT PROFILE ADULT - PUBLIC BENEFITS
Recommend progressive bifocals for the best vision at distance and near. Reassured patient  eyes look very healthy; there is no evidence of glaucoma, cataracts or macular degeneration in either eye.    If she is going with over the counter glasses for r no

## 2022-09-11 NOTE — ED PROVIDER NOTE - OBJECTIVE STATEMENT
patient placed on mechanical ventilation
37F hx factor v leiden on elqiuis, seizures on trileptal pw right side cp and weakness x1 day. no sob. no nausea, vomiting, or fever.

## 2022-09-11 NOTE — H&P ADULT - NSHPREVIEWOFSYSTEMS_GEN_ALL_CORE
Review of Systems:  General:denies fever chills, headache, weakness  HEENT: denies blurry vision,diffculty swallowing, difficulty hearing, tinnitus  Cardiovascular: denies chest pain  ,palpitations  Pulmonary:denies shortness of breath, cough, wheezing, hemoptysis  Gastrointestinal: denies abdominal pain, constipation, diarrhea,nausea , vomiting, hematochezia  : denies hematuria, dysuria, or incontinence  Neurological: denies weakness, numbness , tingling, dizziness, tremors  MSK: denies muscle pain, difficulty ambulating, swelling, back pain  skin: denies skin rash, itching, burning, or  skin lesions  Psychiatrical: denies mood disturbances, anxierty, feeling depressed, depression , or difficulty sleeping Review of Systems:  General:denies fever chills, headache, weakness  HEENT: denies blurry vision,diffculty swallowing, difficulty hearing, tinnitus  Cardiovascular: +++RIGHT sided chest pain  ,palpitations  Pulmonary:denies shortness of breath, cough, wheezing, hemoptysis  Gastrointestinal: denies abdominal pain, constipation, diarrhea,nausea , vomiting, hematochezia  : denies hematuria, dysuria, or incontinence  Neurological: denies weakness, numbness , tingling, dizziness, tremors  MSK: denies muscle pain, difficulty ambulating, swelling, back pain  skin: denies skin rash, itching, burning, or  skin lesions  Psychiatrical: denies mood disturbances, anxierty, feeling depressed, depression , or difficulty sleeping

## 2022-09-11 NOTE — H&P ADULT - HISTORY OF PRESENT ILLNESS
37F hx factor v leiden on elqiuis, seizures on trileptal pw right side cp and weakness x1 day. no sob. no nausea, vomiting, or fever.    ER course  Vitals stable afebrile Lactic acid negative trops x 2 negative. CTangio chest negtive     36 yo F with hx of Factor v leiden on eliquis, seizures on trileptal, Intectual Disability sent from Group Home for chest pain and right arm weakness. History taken by patient. patient reports 10/10 REPRODUCIBLE sharp right sided chest pain , above the breast . Started this afternoon, denies trauma, fever chills or other symptoms. patient denies nausea, vomiting, or fever.    ER course  Vitals stable afebrile Lactic acid negative trops x 2 negative. CTangio chest negattive.  Patient underwent US of chest to r/o anybreast abnormalities: negative. OB was consulted in theED.

## 2022-09-11 NOTE — ED PROVIDER NOTE - CLINICAL SUMMARY MEDICAL DECISION MAKING FREE TEXT BOX
hypotension, concern for pe despite elquis. As interpreted by ED physician, ECG is NSR with normal intervals/axis, no changes in QRS, no ST/T changes. hypotension, concern for pe despite elquis. As interpreted by ED physician, ECG is NSR with normal intervals/axis, no changes in QRS, no ST/T changes.   cp. neg trops. neg pe on cta. admit for pain control as it is intractable and requiring multiple iv doses of morphine.

## 2022-09-12 ENCOUNTER — TRANSCRIPTION ENCOUNTER (OUTPATIENT)
Age: 37
End: 2022-09-12

## 2022-09-12 VITALS
RESPIRATION RATE: 18 BRPM | DIASTOLIC BLOOD PRESSURE: 70 MMHG | SYSTOLIC BLOOD PRESSURE: 120 MMHG | OXYGEN SATURATION: 99 %

## 2022-09-12 PROCEDURE — 99239 HOSP IP/OBS DSCHRG MGMT >30: CPT

## 2022-09-12 RX ORDER — ARIPIPRAZOLE 15 MG/1
1 TABLET ORAL
Qty: 0 | Refills: 0 | DISCHARGE

## 2022-09-12 RX ORDER — ACETAMINOPHEN 500 MG
2 TABLET ORAL
Qty: 0 | Refills: 0 | DISCHARGE
Start: 2022-09-12

## 2022-09-12 RX ORDER — OXYCODONE AND ACETAMINOPHEN 5; 325 MG/1; MG/1
1 TABLET ORAL ONCE
Refills: 0 | Status: DISCONTINUED | OUTPATIENT
Start: 2022-09-12 | End: 2022-09-12

## 2022-09-12 RX ORDER — DIPHENHYDRAMINE HCL 50 MG
25 CAPSULE ORAL ONCE
Refills: 0 | Status: COMPLETED | OUTPATIENT
Start: 2022-09-12 | End: 2022-09-12

## 2022-09-12 RX ADMIN — Medication 1 SPRAY(S): at 06:34

## 2022-09-12 RX ADMIN — OXCARBAZEPINE 150 MILLIGRAM(S): 300 TABLET, FILM COATED ORAL at 06:33

## 2022-09-12 RX ADMIN — APIXABAN 5 MILLIGRAM(S): 2.5 TABLET, FILM COATED ORAL at 06:33

## 2022-09-12 RX ADMIN — Medication 650 MILLIGRAM(S): at 00:23

## 2022-09-12 RX ADMIN — Medication 75 MICROGRAM(S): at 06:33

## 2022-09-12 RX ADMIN — Medication 3 MILLIGRAM(S): at 00:39

## 2022-09-12 RX ADMIN — Medication 25 MILLIGRAM(S): at 12:25

## 2022-09-12 RX ADMIN — OXYCODONE AND ACETAMINOPHEN 1 TABLET(S): 5; 325 TABLET ORAL at 01:00

## 2022-09-12 RX ADMIN — OXYCODONE AND ACETAMINOPHEN 1 TABLET(S): 5; 325 TABLET ORAL at 00:39

## 2022-09-12 RX ADMIN — QUETIAPINE FUMARATE 50 MILLIGRAM(S): 200 TABLET, FILM COATED ORAL at 11:47

## 2022-09-12 RX ADMIN — QUETIAPINE FUMARATE 200 MILLIGRAM(S): 200 TABLET, FILM COATED ORAL at 11:18

## 2022-09-12 RX ADMIN — Medication 650 MILLIGRAM(S): at 08:03

## 2022-09-12 RX ADMIN — ARIPIPRAZOLE 30 MILLIGRAM(S): 15 TABLET ORAL at 11:47

## 2022-09-12 RX ADMIN — LURASIDONE HYDROCHLORIDE 40 MILLIGRAM(S): 40 TABLET ORAL at 11:47

## 2022-09-12 RX ADMIN — Medication 650 MILLIGRAM(S): at 08:30

## 2022-09-12 NOTE — DISCHARGE NOTE PROVIDER - NSDCCPCAREPLAN_GEN_ALL_CORE_FT
PRINCIPAL DISCHARGE DIAGNOSIS  Diagnosis: Chest pain  Assessment and Plan of Treatment: You were admitted with chest pain. This pain is likely musculoskeletal in nature. Please apply warm pack to the affected area as needed and take percocent as needed for pain. Follow up with your primary care provider within 2 weeks. If you experience new or worsening symptoms including chest pain, shortness of breath, palpitations, dizziness please seek medical attention immediately.

## 2022-09-12 NOTE — DISCHARGE NOTE PROVIDER - NSDCFUSCHEDAPPT_GEN_ALL_CORE_FT
Jose G Byers  [facility]  NEUROLOGY [address]  Scheduled Appointment: 09/14/2022    Dawna Snow  [facility]  INFDISEASE [address]  Scheduled Appointment: 09/15/2022    [facility] [address]  Scheduled Appointment: 09/26/2022    Omar Hernandez  [facility]ician [facility] [address]  Scheduled Appointment: 09/26/2022    Rohan Braxton  [facility]  INTMED [address]  Scheduled Appointment: 09/29/2022

## 2022-09-12 NOTE — DISCHARGE NOTE PROVIDER - HOSPITAL COURSE
38 yo F with hx of Factor v leiden on eliquis, seizures on trileptal, Intectual Disability sent from Group Home for chest pain and right arm weakness. In ED patient reported 10/10 REPRODUCIBLE sharp right sided chest pain , above the breast that started the afternoon prior to arrival. Patient denies trauma, fever, chills, nausea, vomiting, or fever. In ED vitals stable, afebrile, lactic acid negative, trops x 2 negative. CTangio chest negative. US of chest negative. Chest pain likely MSK due to tendonitis affecting the upper extremity. Patient medically stable for discharge with pain management including percocet and warm compressed.     Plan D/W

## 2022-09-12 NOTE — OCCUPATIONAL THERAPY INITIAL EVALUATION ADULT - ADDITIONAL COMMENTS
Pt reported she lives in a group home c 2 stairs c handrails to enter. Once inside, pt takes elevator for 4th floor. Pt's bathroom is equipped c a tub-shower combination +grab bars and standard height toilet seat. Pt reports she was independent c all ADLs and functional mobility c no device/assistance. Pt is R handed and wears glasses. Pt reported occasional buckling of RLE knee when walking outside on un-even terrain and is fearful of falling.

## 2022-09-12 NOTE — BH CONSULTATION LIAISON PROGRESS NOTE - NSBHCONSULTRECOMMENDOTHER_PSY_A_CORE FT
continue Seroquel 50mg po bid, haldol 5mg po bid, depakote 500mg po bid, Latuda , Topamax 25mg po qd, Latuda 40mg po qd, Abilify 30mg po qd, no longer on trazodone so it was discontinued. This is an ODD medication regimen with 3 sub-optimal doses of antipsychotics  - Benadryl PRN for agitation (not adding to the antipsychotic burden with an antipsychotic PRN)
English
continue Seroquel 50mg po bid, haldol 5mg po bid, depakote 500mg po bid, Latuda , Topamax 25mg po qd, Latuda 40mg po qd, Abilify 30mg po qd, no longer on trazodone so it was discontinued. This is an ODD medication regimen with 3 sub-optimal doses of antipsychotics  - Benadryl PRN for agitation (not adding to the antipsychotic burden with an antipsychotic PRN)

## 2022-09-12 NOTE — OCCUPATIONAL THERAPY INITIAL EVALUATION ADULT - PERTINENT HX OF CURRENT PROBLEM, REHAB EVAL
Pt is 36 yo F c hx of Factor v leiden on eliquis, seizures on trileptal, Intellectual Disability from Group Home admitted to [facility] ED on 9/11/22 c/o chest pain and right arm weakness. CT chest negative for PE, X-ray chest negative.

## 2022-09-12 NOTE — OCCUPATIONAL THERAPY INITIAL EVALUATION ADULT - VISUAL ACUITY
pt reported she wears glasses and forgot them at home resulting in blurry vision during her hospital stay

## 2022-09-12 NOTE — DISCHARGE NOTE PROVIDER - NSDCMRMEDTOKEN_GEN_ALL_CORE_FT
acetaminophen 325 mg oral tablet: 2 tab(s) orally every 6 hours, As needed, Temp greater or equal to 38C (100.4F), Mild Pain (1 - 3)  ARIPiprazole 30 mg oral tablet: 1 tab(s) orally once a day  Benadryl 25 mg oral capsule: 1 cap(s) orally every 6 hours, As Needed  chlorproMAZINE 50 mg oral tablet: 1 tab(s) orally every 6 hours, As Needed -for agitation   Depakote  mg oral tablet, extended release: 1 tab(s) orally once a day in morning   Depakote  mg oral tablet, extended release: 2 tab(s) orally once a day (at bedtime)   docusate sodium 100 mg oral capsule: 1 cap(s) orally once a day (at bedtime), As Needed for constipation  Eliquis 5 mg oral tablet: 1 tab(s) orally 2 times a day   EPINEPHrine:   Eucerin topical lotion: Apply topically to affected area 2 times a day  Flovent 110 mcg/inh inhalation aerosol with adapter: 1 puff(s) inhaled 2 times a day  fluticasone 50 mcg/inh nasal spray: 1 spray(s) nasal 2 times a day  levothyroxine 75 mcg (0.075 mg) oral tablet: 1 tab(s) orally once a day  lurasidone 40 mg oral tablet: 1 tab(s) orally once a day  Multiple Vitamins with Iron oral tablet: 1 tab(s) orally once a day  olopatadine 0.1% ophthalmic solution: 1 drop(s) to each affected eye once a day, As Needed for allergy   olopatadine 0.1% ophthalmic solution: 1 drop(s) to each affected eye every 6 hours, As Needed  oxycodone-acetaminophen 5 mg-300 mg oral tablet: 1 tab(s) orally every 8 hours, As Needed -for moderate pain MDD:3  pantoprazole 40 mg oral delayed release tablet: 1 tab(s) orally once a day  pantoprazole 40 mg oral delayed release tablet: 1 tab(s) orally once a day (before a meal)  polyethylene glycol 3350 oral powder for reconstitution: 17 gram(s) orally once a day  QUEtiapine 200 mg oral tablet: 1 tab(s) orally 2 times a day at 8 am and 8pm  QUEtiapine 50 mg oral tablet: 1 tab(s) orally once a day at noon  SEROquel 200 mg oral tablet: 1 tab(s) orally 2 times a day MDD:500mg  SEROquel 50 mg oral tablet: 1 tab(s) orally once a day MDD:1 pill  sulfamethoxazole-trimethoprim DS: once a day  Trileptal 150 mg oral tablet: 1 tab(s) orally 2 times a day MDD:300mg  Vitamin D2 1.25 mg (50,000 intl units) oral capsule: 1 cap(s) orally once a week sunday

## 2022-09-12 NOTE — DISCHARGE NOTE NURSING/CASE MANAGEMENT/SOCIAL WORK - NSDCPEFALRISK_GEN_ALL_CORE
For information on Fall & Injury Prevention, visit: [email or web address] OR  [email or web address] OR  https://www.cdc.gov/steadi/patient.html

## 2022-09-12 NOTE — DISCHARGE NOTE PROVIDER - NSDCFUADDAPPT_GEN_ALL_CORE_FT
It is important to see your primary physician as well as the physicians noted below within the next week to perform a comprehensive medical review.  Call their offices for an appointment as soon as you leave the hospital.  You will also need to see them for renewal of your medications.  If you do not have a primary physician, contact the [facility] Physician Referral Service at [phone].  To obtain your results, you can access the [facility] Patient Portal at [email or web address].  Your medical issues appear to be stable at this time, but if your symptoms recur or worsen, contact your physicians and/or return to the hospital if necessary.  If you encounter any issues or questions with your medication, call your physicians before stopping the medication.

## 2022-09-12 NOTE — DISCHARGE NOTE NURSING/CASE MANAGEMENT/SOCIAL WORK - PATIENT PORTAL LINK FT
You can access the FollowMyHealth Patient Portal offered by [facility] by registering at the following website: [email or web address]. By joining [facility]’s FollowMyHealth portal, you will also be able to view your health information using other applications (apps) compatible with our system.

## 2022-09-14 ENCOUNTER — APPOINTMENT (OUTPATIENT)
Dept: NEUROLOGY | Facility: CLINIC | Age: 37
End: 2022-09-14

## 2022-09-15 ENCOUNTER — APPOINTMENT (OUTPATIENT)
Dept: INFECTIOUS DISEASE | Facility: CLINIC | Age: 37
End: 2022-09-15

## 2022-09-15 DIAGNOSIS — Z79.51 LONG TERM (CURRENT) USE OF INHALED STEROIDS: ICD-10-CM

## 2022-09-15 DIAGNOSIS — F31.9 BIPOLAR DISORDER, UNSPECIFIED: ICD-10-CM

## 2022-09-15 DIAGNOSIS — Z88.0 ALLERGY STATUS TO PENICILLIN: ICD-10-CM

## 2022-09-15 DIAGNOSIS — N80.9 ENDOMETRIOSIS, UNSPECIFIED: ICD-10-CM

## 2022-09-15 DIAGNOSIS — Z79.01 LONG TERM (CURRENT) USE OF ANTICOAGULANTS: ICD-10-CM

## 2022-09-15 DIAGNOSIS — Z86.718 PERSONAL HISTORY OF OTHER VENOUS THROMBOSIS AND EMBOLISM: ICD-10-CM

## 2022-09-15 DIAGNOSIS — K21.9 GASTRO-ESOPHAGEAL REFLUX DISEASE WITHOUT ESOPHAGITIS: ICD-10-CM

## 2022-09-15 DIAGNOSIS — Z91.040 LATEX ALLERGY STATUS: ICD-10-CM

## 2022-09-15 DIAGNOSIS — J30.2 OTHER SEASONAL ALLERGIC RHINITIS: ICD-10-CM

## 2022-09-15 DIAGNOSIS — R07.89 OTHER CHEST PAIN: ICD-10-CM

## 2022-09-15 DIAGNOSIS — E03.9 HYPOTHYROIDISM, UNSPECIFIED: ICD-10-CM

## 2022-09-15 DIAGNOSIS — R07.9 CHEST PAIN, UNSPECIFIED: ICD-10-CM

## 2022-09-15 DIAGNOSIS — G47.00 INSOMNIA, UNSPECIFIED: ICD-10-CM

## 2022-09-15 DIAGNOSIS — F79 UNSPECIFIED INTELLECTUAL DISABILITIES: ICD-10-CM

## 2022-09-15 DIAGNOSIS — M77.8 OTHER ENTHESOPATHIES, NOT ELSEWHERE CLASSIFIED: ICD-10-CM

## 2022-09-15 DIAGNOSIS — Z91.018 ALLERGY TO OTHER FOODS: ICD-10-CM

## 2022-09-15 DIAGNOSIS — D68.2 HEREDITARY DEFICIENCY OF OTHER CLOTTING FACTORS: ICD-10-CM

## 2022-09-15 DIAGNOSIS — Z79.82 LONG TERM (CURRENT) USE OF ASPIRIN: ICD-10-CM

## 2022-09-15 DIAGNOSIS — J45.909 UNSPECIFIED ASTHMA, UNCOMPLICATED: ICD-10-CM

## 2022-09-15 DIAGNOSIS — Z79.891 LONG TERM (CURRENT) USE OF OPIATE ANALGESIC: ICD-10-CM

## 2022-09-15 DIAGNOSIS — G40.909 EPILEPSY, UNSPECIFIED, NOT INTRACTABLE, WITHOUT STATUS EPILEPTICUS: ICD-10-CM

## 2022-09-15 LAB — OXCARBAZEPINE SERPL-MCNC: 4 UG/ML — LOW (ref 10–35)

## 2022-09-15 PROCEDURE — 99442: CPT

## 2022-09-15 NOTE — REASON FOR VISIT
[Home] : at home, [unfilled] , at the time of the visit. [Medical Office: ([facility])___] : at the medical office located in  [Formal Caregiver] : formal caregiver [Verbal consent obtained from patient] : the patient, [unfilled] [Post Hospitalization] : a post hospitalization visit [FreeTextEntry1] : arm swelling

## 2022-09-15 NOTE — ASSESSMENT
[FreeTextEntry1] : 38 yo woman with bipolar disorder, Factor V Leiden, seizure disorder who was transferred to [facility] late June 2022 for evaluation of left deltoid pain and swelling. CT showed fluid collection. IR aspirated the fluid- 4 cc dark red fluid aspirated. Cultures reviewed today with patient and medical coordinator. 6/29 routine wound culture no growth x 14 days, fungal no growth at 4 weeks, AFB no growth at 6 weeks. Received approx 3 weeks of IV and po antibiotics  Reassured patient that final cultures were negative.  Now following with dermatology for cystic focus thought to represent fat necrosis 0.7 cm.\par Planning to follow with dermatologist again in October. \par May f/u in office with me if concern for infection in future.

## 2022-09-16 ENCOUNTER — NON-APPOINTMENT (OUTPATIENT)
Age: 37
End: 2022-09-16

## 2022-09-19 ENCOUNTER — INPATIENT (INPATIENT)
Facility: HOSPITAL | Age: 37
LOS: 3 days | Discharge: HOME | End: 2022-09-23
Attending: HOSPITALIST | Admitting: HOSPITALIST

## 2022-09-19 VITALS
HEIGHT: 68 IN | OXYGEN SATURATION: 98 % | TEMPERATURE: 98 F | RESPIRATION RATE: 16 BRPM | HEART RATE: 97 BPM | DIASTOLIC BLOOD PRESSURE: 84 MMHG | SYSTOLIC BLOOD PRESSURE: 122 MMHG

## 2022-09-19 PROCEDURE — 93010 ELECTROCARDIOGRAM REPORT: CPT

## 2022-09-19 PROCEDURE — 36000 PLACE NEEDLE IN VEIN: CPT

## 2022-09-19 PROCEDURE — 99285 EMERGENCY DEPT VISIT HI MDM: CPT | Mod: 25

## 2022-09-19 RX ORDER — ACETAMINOPHEN 500 MG
650 TABLET ORAL ONCE
Refills: 0 | Status: COMPLETED | OUTPATIENT
Start: 2022-09-19 | End: 2022-09-19

## 2022-09-19 RX ADMIN — Medication 650 MILLIGRAM(S): at 23:50

## 2022-09-19 NOTE — ED ADULT TRIAGE NOTE - DOMESTIC TRAVEL HIGH RISK QUESTION
Labs personally reviewed : cbc nondx, coags wnl, mild hypernatremia, cmp c/w stable ckd3.     Imaging personally reviewed  CXR prelim lungs grossly clear, limited study.  LLE Doppler showed thrombus in L common fem, fem, and pop veins including tib peroneal trunk.     EKG personally reviewed Labs personally reviewed : cbc nondx, coags wnl, mild hypernatremia, cmp c/w stable ckd3.     Imaging personally reviewed  CXR prelim lungs grossly clear, limited study.  LLE Doppler showed thrombus in L common fem, fem, and pop veins including tib peroneal trunk.     EKG personally reviewed NSR 60s No

## 2022-09-19 NOTE — ED ADULT NURSE NOTE - OBJECTIVE STATEMENT
Pt received to RM 2 AxOx4 from psychiatric home C/O Ear, body aches and BL hand tingling since yesterday. Pt is speaking in clear fluent sentences with flat affect, difficult to obtain information. NSR on cardiac monitor, respirations are equal and unlabored. No acute distress noted. Pt is generally well appearing. Denies chest pain, SOB, headache, N/V/D. Pt calm and cooperative during interview. [facility] spoke with Luna Pineda RN who is covering pt overnight. Community RN not at bedside, Management made aware. Denies SI/HI, ETOH and druge use. PMH seizures, hypothyroidism, Bipolar disorder, Intellectual disability.

## 2022-09-19 NOTE — ED ADULT TRIAGE NOTE - CHIEF COMPLAINT QUOTE
Pt arrives via EMS from psychiatric group home c/o body aches and numbness in the hands bilaterally, and right ear pain since yesterday. Calm and cooperative in triage. PMH: DM, DVT (Eliquis), factor V Leiden, HTN, insomnia, hypothyroid, bipolar disorder, seizures, asthma. Group home # [phone]. none

## 2022-09-19 NOTE — ED ADULT NURSE NOTE - CHIEF COMPLAINT QUOTE
Pt arrives via EMS from psychiatric group home c/o body aches and numbness in the hands bilaterally, and right ear pain since yesterday. Calm and cooperative in triage. PMH: DM, DVT (Eliquis), factor V Leiden, HTN, insomnia, hypothyroid, bipolar disorder, seizures, asthma. Group home # [phone].

## 2022-09-20 DIAGNOSIS — I10 ESSENTIAL (PRIMARY) HYPERTENSION: ICD-10-CM

## 2022-09-20 DIAGNOSIS — D68.51 ACTIVATED PROTEIN C RESISTANCE: ICD-10-CM

## 2022-09-20 DIAGNOSIS — Z29.9 ENCOUNTER FOR PROPHYLACTIC MEASURES, UNSPECIFIED: ICD-10-CM

## 2022-09-20 DIAGNOSIS — F31.9 BIPOLAR DISORDER, UNSPECIFIED: ICD-10-CM

## 2022-09-20 DIAGNOSIS — K21.9 GASTRO-ESOPHAGEAL REFLUX DISEASE WITHOUT ESOPHAGITIS: ICD-10-CM

## 2022-09-20 DIAGNOSIS — R10.2 PELVIC AND PERINEAL PAIN: ICD-10-CM

## 2022-09-20 DIAGNOSIS — R56.9 UNSPECIFIED CONVULSIONS: ICD-10-CM

## 2022-09-20 DIAGNOSIS — F79 UNSPECIFIED INTELLECTUAL DISABILITIES: ICD-10-CM

## 2022-09-20 DIAGNOSIS — Z79.899 OTHER LONG TERM (CURRENT) DRUG THERAPY: ICD-10-CM

## 2022-09-20 DIAGNOSIS — E11.9 TYPE 2 DIABETES MELLITUS WITHOUT COMPLICATIONS: ICD-10-CM

## 2022-09-20 DIAGNOSIS — G47.00 INSOMNIA, UNSPECIFIED: ICD-10-CM

## 2022-09-20 LAB
ALBUMIN SERPL ELPH-MCNC: 4.3 G/DL — SIGNIFICANT CHANGE UP (ref 3.3–5)
ALP SERPL-CCNC: 56 U/L — SIGNIFICANT CHANGE UP (ref 40–120)
ALT FLD-CCNC: 20 U/L — SIGNIFICANT CHANGE UP (ref 4–33)
ANION GAP SERPL CALC-SCNC: 11 MMOL/L — SIGNIFICANT CHANGE UP (ref 7–14)
APPEARANCE UR: ABNORMAL
AST SERPL-CCNC: 17 U/L — SIGNIFICANT CHANGE UP (ref 4–32)
B PERT DNA SPEC QL NAA+PROBE: SIGNIFICANT CHANGE UP
B PERT+PARAPERT DNA PNL SPEC NAA+PROBE: SIGNIFICANT CHANGE UP
BACTERIA # UR AUTO: ABNORMAL
BASE EXCESS BLDV CALC-SCNC: 0.4 MMOL/L — SIGNIFICANT CHANGE UP (ref -2–3)
BASOPHILS # BLD AUTO: 0.02 K/UL — SIGNIFICANT CHANGE UP (ref 0–0.2)
BASOPHILS NFR BLD AUTO: 0.4 % — SIGNIFICANT CHANGE UP (ref 0–2)
BILIRUB SERPL-MCNC: 0.2 MG/DL — SIGNIFICANT CHANGE UP (ref 0.2–1.2)
BILIRUB UR-MCNC: NEGATIVE — SIGNIFICANT CHANGE UP
BLOOD GAS VENOUS COMPREHENSIVE RESULT: SIGNIFICANT CHANGE UP
BORDETELLA PARAPERTUSSIS (RAPRVP): SIGNIFICANT CHANGE UP
BUN SERPL-MCNC: 9 MG/DL — SIGNIFICANT CHANGE UP (ref 7–23)
C PNEUM DNA SPEC QL NAA+PROBE: SIGNIFICANT CHANGE UP
CALCIUM SERPL-MCNC: 9.5 MG/DL — SIGNIFICANT CHANGE UP (ref 8.4–10.5)
CHLORIDE BLDV-SCNC: 105 MMOL/L — SIGNIFICANT CHANGE UP (ref 96–108)
CHLORIDE SERPL-SCNC: 108 MMOL/L — HIGH (ref 98–107)
CO2 BLDV-SCNC: 28.5 MMOL/L — HIGH (ref 22–26)
CO2 SERPL-SCNC: 23 MMOL/L — SIGNIFICANT CHANGE UP (ref 22–31)
COLOR SPEC: YELLOW — SIGNIFICANT CHANGE UP
CREAT SERPL-MCNC: 0.85 MG/DL — SIGNIFICANT CHANGE UP (ref 0.5–1.3)
DIFF PNL FLD: NEGATIVE — SIGNIFICANT CHANGE UP
EGFR: 92 ML/MIN/1.73M2 — SIGNIFICANT CHANGE UP
EOSINOPHIL # BLD AUTO: 0.04 K/UL — SIGNIFICANT CHANGE UP (ref 0–0.5)
EOSINOPHIL NFR BLD AUTO: 0.9 % — SIGNIFICANT CHANGE UP (ref 0–6)
EPI CELLS # UR: 16 /HPF — HIGH (ref 0–5)
FLUAV SUBTYP SPEC NAA+PROBE: SIGNIFICANT CHANGE UP
FLUBV RNA SPEC QL NAA+PROBE: SIGNIFICANT CHANGE UP
GAS PNL BLDV: 137 MMOL/L — SIGNIFICANT CHANGE UP (ref 136–145)
GLUCOSE BLDC GLUCOMTR-MCNC: 106 MG/DL — HIGH (ref 70–99)
GLUCOSE BLDC GLUCOMTR-MCNC: 110 MG/DL — HIGH (ref 70–99)
GLUCOSE BLDC GLUCOMTR-MCNC: 56 MG/DL — LOW (ref 70–99)
GLUCOSE BLDC GLUCOMTR-MCNC: 59 MG/DL — LOW (ref 70–99)
GLUCOSE BLDC GLUCOMTR-MCNC: 64 MG/DL — LOW (ref 70–99)
GLUCOSE BLDC GLUCOMTR-MCNC: 64 MG/DL — LOW (ref 70–99)
GLUCOSE BLDC GLUCOMTR-MCNC: 79 MG/DL — SIGNIFICANT CHANGE UP (ref 70–99)
GLUCOSE BLDC GLUCOMTR-MCNC: 82 MG/DL — SIGNIFICANT CHANGE UP (ref 70–99)
GLUCOSE BLDC GLUCOMTR-MCNC: 96 MG/DL — SIGNIFICANT CHANGE UP (ref 70–99)
GLUCOSE BLDV-MCNC: 81 MG/DL — SIGNIFICANT CHANGE UP (ref 70–99)
GLUCOSE SERPL-MCNC: 90 MG/DL — SIGNIFICANT CHANGE UP (ref 70–99)
GLUCOSE UR QL: NEGATIVE — SIGNIFICANT CHANGE UP
HADV DNA SPEC QL NAA+PROBE: SIGNIFICANT CHANGE UP
HCO3 BLDV-SCNC: 27 MMOL/L — SIGNIFICANT CHANGE UP (ref 22–29)
HCOV 229E RNA SPEC QL NAA+PROBE: SIGNIFICANT CHANGE UP
HCOV HKU1 RNA SPEC QL NAA+PROBE: SIGNIFICANT CHANGE UP
HCOV NL63 RNA SPEC QL NAA+PROBE: SIGNIFICANT CHANGE UP
HCOV OC43 RNA SPEC QL NAA+PROBE: SIGNIFICANT CHANGE UP
HCT VFR BLD CALC: 34.3 % — LOW (ref 34.5–45)
HCT VFR BLDA CALC: 34 % — LOW (ref 34.5–46.5)
HGB BLD CALC-MCNC: 11.4 G/DL — LOW (ref 11.5–15.5)
HGB BLD-MCNC: 11.1 G/DL — LOW (ref 11.5–15.5)
HMPV RNA SPEC QL NAA+PROBE: SIGNIFICANT CHANGE UP
HPIV1 RNA SPEC QL NAA+PROBE: SIGNIFICANT CHANGE UP
HPIV2 RNA SPEC QL NAA+PROBE: SIGNIFICANT CHANGE UP
HPIV3 RNA SPEC QL NAA+PROBE: SIGNIFICANT CHANGE UP
HPIV4 RNA SPEC QL NAA+PROBE: SIGNIFICANT CHANGE UP
HYALINE CASTS # UR AUTO: 0 /LPF — SIGNIFICANT CHANGE UP (ref 0–7)
IANC: 1.78 K/UL — LOW (ref 1.8–7.4)
IMM GRANULOCYTES NFR BLD AUTO: 0.2 % — SIGNIFICANT CHANGE UP (ref 0–0.9)
KETONES UR-MCNC: NEGATIVE — SIGNIFICANT CHANGE UP
LACTATE BLDV-MCNC: 1.3 MMOL/L — SIGNIFICANT CHANGE UP (ref 0.5–2)
LEUKOCYTE ESTERASE UR-ACNC: NEGATIVE — SIGNIFICANT CHANGE UP
LYMPHOCYTES # BLD AUTO: 2.41 K/UL — SIGNIFICANT CHANGE UP (ref 1–3.3)
LYMPHOCYTES # BLD AUTO: 51.9 % — HIGH (ref 13–44)
M PNEUMO DNA SPEC QL NAA+PROBE: SIGNIFICANT CHANGE UP
MAGNESIUM SERPL-MCNC: 1.9 MG/DL — SIGNIFICANT CHANGE UP (ref 1.6–2.6)
MCHC RBC-ENTMCNC: 29.3 PG — SIGNIFICANT CHANGE UP (ref 27–34)
MCHC RBC-ENTMCNC: 32.4 GM/DL — SIGNIFICANT CHANGE UP (ref 32–36)
MCV RBC AUTO: 90.5 FL — SIGNIFICANT CHANGE UP (ref 80–100)
MONOCYTES # BLD AUTO: 0.38 K/UL — SIGNIFICANT CHANGE UP (ref 0–0.9)
MONOCYTES NFR BLD AUTO: 8.2 % — SIGNIFICANT CHANGE UP (ref 2–14)
NEUTROPHILS # BLD AUTO: 1.78 K/UL — LOW (ref 1.8–7.4)
NEUTROPHILS NFR BLD AUTO: 38.4 % — LOW (ref 43–77)
NITRITE UR-MCNC: NEGATIVE — SIGNIFICANT CHANGE UP
NRBC # BLD: 0 /100 WBCS — SIGNIFICANT CHANGE UP (ref 0–0)
NRBC # FLD: 0 K/UL — SIGNIFICANT CHANGE UP (ref 0–0)
PCO2 BLDV: 51 MMHG — HIGH (ref 39–42)
PH BLDV: 7.33 — SIGNIFICANT CHANGE UP (ref 7.32–7.43)
PH UR: 6 — SIGNIFICANT CHANGE UP (ref 5–8)
PLATELET # BLD AUTO: 169 K/UL — SIGNIFICANT CHANGE UP (ref 150–400)
PO2 BLDV: 41 MMHG — SIGNIFICANT CHANGE UP
POTASSIUM BLDV-SCNC: 4.3 MMOL/L — SIGNIFICANT CHANGE UP (ref 3.5–5.1)
POTASSIUM SERPL-MCNC: 4.4 MMOL/L — SIGNIFICANT CHANGE UP (ref 3.5–5.3)
POTASSIUM SERPL-SCNC: 4.4 MMOL/L — SIGNIFICANT CHANGE UP (ref 3.5–5.3)
PROT SERPL-MCNC: 7.7 G/DL — SIGNIFICANT CHANGE UP (ref 6–8.3)
PROT UR-MCNC: ABNORMAL
RAPID RVP RESULT: SIGNIFICANT CHANGE UP
RBC # BLD: 3.79 M/UL — LOW (ref 3.8–5.2)
RBC # FLD: 14 % — SIGNIFICANT CHANGE UP (ref 10.3–14.5)
RBC CASTS # UR COMP ASSIST: 1 /HPF — SIGNIFICANT CHANGE UP (ref 0–4)
RSV RNA SPEC QL NAA+PROBE: SIGNIFICANT CHANGE UP
RV+EV RNA SPEC QL NAA+PROBE: SIGNIFICANT CHANGE UP
SAO2 % BLDV: 61.5 % — SIGNIFICANT CHANGE UP
SARS-COV-2 RNA SPEC QL NAA+PROBE: SIGNIFICANT CHANGE UP
SODIUM SERPL-SCNC: 142 MMOL/L — SIGNIFICANT CHANGE UP (ref 135–145)
SP GR SPEC: 1.02 — SIGNIFICANT CHANGE UP (ref 1.01–1.05)
TROPONIN T, HIGH SENSITIVITY RESULT: <6 NG/L — SIGNIFICANT CHANGE UP
UROBILINOGEN FLD QL: SIGNIFICANT CHANGE UP
WBC # BLD: 4.64 K/UL — SIGNIFICANT CHANGE UP (ref 3.8–10.5)
WBC # FLD AUTO: 4.64 K/UL — SIGNIFICANT CHANGE UP (ref 3.8–10.5)
WBC UR QL: 5 /HPF — SIGNIFICANT CHANGE UP (ref 0–5)

## 2022-09-20 PROCEDURE — 99223 1ST HOSP IP/OBS HIGH 75: CPT

## 2022-09-20 PROCEDURE — 70450 CT HEAD/BRAIN W/O DYE: CPT | Mod: 26

## 2022-09-20 RX ORDER — DIPHENHYDRAMINE HCL 50 MG
25 CAPSULE ORAL ONCE
Refills: 0 | Status: COMPLETED | OUTPATIENT
Start: 2022-09-20 | End: 2022-09-20

## 2022-09-20 RX ORDER — LANOLIN ALCOHOL/MO/W.PET/CERES
3 CREAM (GRAM) TOPICAL AT BEDTIME
Refills: 0 | Status: DISCONTINUED | OUTPATIENT
Start: 2022-09-20 | End: 2022-09-20

## 2022-09-20 RX ORDER — HALOPERIDOL DECANOATE 100 MG/ML
2.5 INJECTION INTRAMUSCULAR ONCE
Refills: 0 | Status: COMPLETED | OUTPATIENT
Start: 2022-09-20 | End: 2022-09-20

## 2022-09-20 RX ORDER — OXCARBAZEPINE 300 MG/1
150 TABLET, FILM COATED ORAL ONCE
Refills: 0 | Status: COMPLETED | OUTPATIENT
Start: 2022-09-20 | End: 2022-09-20

## 2022-09-20 RX ORDER — LEVOTHYROXINE SODIUM 125 MCG
75 TABLET ORAL DAILY
Refills: 0 | Status: DISCONTINUED | OUTPATIENT
Start: 2022-09-20 | End: 2022-09-23

## 2022-09-20 RX ORDER — ACETAMINOPHEN 500 MG
325 TABLET ORAL ONCE
Refills: 0 | Status: DISCONTINUED | OUTPATIENT
Start: 2022-09-20 | End: 2022-09-20

## 2022-09-20 RX ORDER — METOCLOPRAMIDE HCL 10 MG
5 TABLET ORAL DAILY
Refills: 0 | Status: DISCONTINUED | OUTPATIENT
Start: 2022-09-20 | End: 2022-09-20

## 2022-09-20 RX ORDER — OXYCODONE AND ACETAMINOPHEN 5; 325 MG/1; MG/1
1 TABLET ORAL ONCE
Refills: 0 | Status: DISCONTINUED | OUTPATIENT
Start: 2022-09-20 | End: 2022-09-20

## 2022-09-20 RX ORDER — POLYETHYLENE GLYCOL 3350 17 G/17G
17 POWDER, FOR SOLUTION ORAL DAILY
Refills: 0 | Status: DISCONTINUED | OUTPATIENT
Start: 2022-09-20 | End: 2022-09-23

## 2022-09-20 RX ORDER — DEXTROSE 50 % IN WATER 50 %
25 SYRINGE (ML) INTRAVENOUS ONCE
Refills: 0 | Status: DISCONTINUED | OUTPATIENT
Start: 2022-09-20 | End: 2022-09-23

## 2022-09-20 RX ORDER — CHLORPROMAZINE HCL 10 MG
25 TABLET ORAL THREE TIMES A DAY
Refills: 0 | Status: DISCONTINUED | OUTPATIENT
Start: 2022-09-20 | End: 2022-09-20

## 2022-09-20 RX ORDER — ACETAMINOPHEN 500 MG
650 TABLET ORAL EVERY 6 HOURS
Refills: 0 | Status: DISCONTINUED | OUTPATIENT
Start: 2022-09-20 | End: 2022-09-23

## 2022-09-20 RX ORDER — QUETIAPINE FUMARATE 200 MG/1
50 TABLET, FILM COATED ORAL
Refills: 0 | Status: DISCONTINUED | OUTPATIENT
Start: 2022-09-20 | End: 2022-09-23

## 2022-09-20 RX ORDER — LURASIDONE HYDROCHLORIDE 40 MG/1
1 TABLET ORAL
Qty: 0 | Refills: 0 | DISCHARGE

## 2022-09-20 RX ORDER — CHLORPROMAZINE HCL 10 MG
50 TABLET ORAL
Refills: 0 | Status: DISCONTINUED | OUTPATIENT
Start: 2022-09-20 | End: 2022-09-20

## 2022-09-20 RX ORDER — DEXTROSE 50 % IN WATER 50 %
15 SYRINGE (ML) INTRAVENOUS ONCE
Refills: 0 | Status: COMPLETED | OUTPATIENT
Start: 2022-09-20 | End: 2022-09-20

## 2022-09-20 RX ORDER — DEXTROSE 50 % IN WATER 50 %
12.5 SYRINGE (ML) INTRAVENOUS ONCE
Refills: 0 | Status: DISCONTINUED | OUTPATIENT
Start: 2022-09-20 | End: 2022-09-23

## 2022-09-20 RX ORDER — LANOLIN ALCOHOL/MO/W.PET/CERES
3 CREAM (GRAM) TOPICAL AT BEDTIME
Refills: 0 | Status: DISCONTINUED | OUTPATIENT
Start: 2022-09-20 | End: 2022-09-23

## 2022-09-20 RX ORDER — CHLORPROMAZINE HCL 10 MG
50 TABLET ORAL
Refills: 0 | Status: DISCONTINUED | OUTPATIENT
Start: 2022-09-20 | End: 2022-09-23

## 2022-09-20 RX ORDER — LANOLIN/MINERAL OIL
0 LOTION (ML) TOPICAL
Qty: 0 | Refills: 0 | DISCHARGE

## 2022-09-20 RX ORDER — FLUTICASONE PROPIONATE 220 MCG
1 AEROSOL WITH ADAPTER (GRAM) INHALATION
Qty: 0 | Refills: 0 | DISCHARGE

## 2022-09-20 RX ORDER — OLOPATADINE HYDROCHLORIDE 1 MG/ML
1 SOLUTION/ DROPS OPHTHALMIC
Qty: 0 | Refills: 0 | DISCHARGE

## 2022-09-20 RX ORDER — APIXABAN 2.5 MG/1
2.5 TABLET, FILM COATED ORAL EVERY 12 HOURS
Refills: 0 | Status: DISCONTINUED | OUTPATIENT
Start: 2022-09-20 | End: 2022-09-23

## 2022-09-20 RX ORDER — MORPHINE SULFATE 50 MG/1
0.5 CAPSULE, EXTENDED RELEASE ORAL ONCE
Refills: 0 | Status: DISCONTINUED | OUTPATIENT
Start: 2022-09-20 | End: 2022-09-20

## 2022-09-20 RX ORDER — QUETIAPINE FUMARATE 200 MG/1
200 TABLET, FILM COATED ORAL
Refills: 0 | Status: DISCONTINUED | OUTPATIENT
Start: 2022-09-20 | End: 2022-09-23

## 2022-09-20 RX ORDER — ACETAMINOPHEN 500 MG
650 TABLET ORAL ONCE
Refills: 0 | Status: COMPLETED | OUTPATIENT
Start: 2022-09-20 | End: 2022-09-20

## 2022-09-20 RX ORDER — OXCARBAZEPINE 300 MG/1
150 TABLET, FILM COATED ORAL
Refills: 0 | Status: DISCONTINUED | OUTPATIENT
Start: 2022-09-20 | End: 2022-09-23

## 2022-09-20 RX ORDER — CHLORPROMAZINE HCL 10 MG
50 TABLET ORAL ONCE
Refills: 0 | Status: COMPLETED | OUTPATIENT
Start: 2022-09-20 | End: 2022-09-20

## 2022-09-20 RX ORDER — HALOPERIDOL DECANOATE 100 MG/ML
2.5 INJECTION INTRAMUSCULAR ONCE
Refills: 0 | Status: DISCONTINUED | OUTPATIENT
Start: 2022-09-20 | End: 2022-09-23

## 2022-09-20 RX ORDER — CHLORPROMAZINE HCL 10 MG
1 TABLET ORAL
Qty: 0 | Refills: 0 | DISCHARGE

## 2022-09-20 RX ORDER — SODIUM CHLORIDE 9 MG/ML
1000 INJECTION, SOLUTION INTRAVENOUS
Refills: 0 | Status: DISCONTINUED | OUTPATIENT
Start: 2022-09-20 | End: 2022-09-23

## 2022-09-20 RX ORDER — DEXTROSE 50 % IN WATER 50 %
15 SYRINGE (ML) INTRAVENOUS ONCE
Refills: 0 | Status: DISCONTINUED | OUTPATIENT
Start: 2022-09-20 | End: 2022-09-23

## 2022-09-20 RX ORDER — LURASIDONE HYDROCHLORIDE 40 MG/1
40 TABLET ORAL
Refills: 0 | Status: DISCONTINUED | OUTPATIENT
Start: 2022-09-20 | End: 2022-09-20

## 2022-09-20 RX ORDER — EPINEPHRINE 0.3 MG/.3ML
0 INJECTION INTRAMUSCULAR; SUBCUTANEOUS
Qty: 0 | Refills: 0 | DISCHARGE

## 2022-09-20 RX ORDER — INSULIN LISPRO 100/ML
VIAL (ML) SUBCUTANEOUS
Refills: 0 | Status: DISCONTINUED | OUTPATIENT
Start: 2022-09-20 | End: 2022-09-20

## 2022-09-20 RX ORDER — FLUTICASONE PROPIONATE 50 MCG
1 SPRAY, SUSPENSION NASAL
Refills: 0 | Status: DISCONTINUED | OUTPATIENT
Start: 2022-09-20 | End: 2022-09-23

## 2022-09-20 RX ORDER — LACTOBACILLUS ACIDOPHILUS 100MM CELL
1 CAPSULE ORAL DAILY
Refills: 0 | Status: DISCONTINUED | OUTPATIENT
Start: 2022-09-20 | End: 2022-09-23

## 2022-09-20 RX ORDER — QUETIAPINE FUMARATE 200 MG/1
200 TABLET, FILM COATED ORAL EVERY 12 HOURS
Refills: 0 | Status: DISCONTINUED | OUTPATIENT
Start: 2022-09-20 | End: 2022-09-20

## 2022-09-20 RX ORDER — PANTOPRAZOLE SODIUM 20 MG/1
40 TABLET, DELAYED RELEASE ORAL
Refills: 0 | Status: DISCONTINUED | OUTPATIENT
Start: 2022-09-20 | End: 2022-09-23

## 2022-09-20 RX ORDER — INSULIN LISPRO 100/ML
VIAL (ML) SUBCUTANEOUS AT BEDTIME
Refills: 0 | Status: DISCONTINUED | OUTPATIENT
Start: 2022-09-20 | End: 2022-09-20

## 2022-09-20 RX ORDER — DEXTROSE 50 % IN WATER 50 %
12.5 SYRINGE (ML) INTRAVENOUS ONCE
Refills: 0 | Status: COMPLETED | OUTPATIENT
Start: 2022-09-20 | End: 2022-09-20

## 2022-09-20 RX ORDER — DIVALPROEX SODIUM 500 MG/1
1 TABLET, DELAYED RELEASE ORAL
Qty: 0 | Refills: 0 | DISCHARGE

## 2022-09-20 RX ORDER — INFLUENZA VIRUS VACCINE 15; 15; 15; 15 UG/.5ML; UG/.5ML; UG/.5ML; UG/.5ML
0.5 SUSPENSION INTRAMUSCULAR ONCE
Refills: 0 | Status: DISCONTINUED | OUTPATIENT
Start: 2022-09-20 | End: 2022-09-23

## 2022-09-20 RX ORDER — ACETAMINOPHEN 500 MG
650 TABLET ORAL ONCE
Refills: 0 | Status: DISCONTINUED | OUTPATIENT
Start: 2022-09-20 | End: 2022-09-20

## 2022-09-20 RX ORDER — HALOPERIDOL DECANOATE 100 MG/ML
5 INJECTION INTRAMUSCULAR ONCE
Refills: 0 | Status: COMPLETED | OUTPATIENT
Start: 2022-09-20 | End: 2022-09-20

## 2022-09-20 RX ADMIN — Medication 15 GRAM(S): at 11:36

## 2022-09-20 RX ADMIN — Medication 25 MILLIGRAM(S): at 20:13

## 2022-09-20 RX ADMIN — OXCARBAZEPINE 150 MILLIGRAM(S): 300 TABLET, FILM COATED ORAL at 19:28

## 2022-09-20 RX ADMIN — Medication 3 MILLIGRAM(S): at 23:56

## 2022-09-20 RX ADMIN — OXCARBAZEPINE 150 MILLIGRAM(S): 300 TABLET, FILM COATED ORAL at 10:11

## 2022-09-20 RX ADMIN — Medication 1 TABLET(S): at 14:03

## 2022-09-20 RX ADMIN — Medication 25 MILLIGRAM(S): at 21:21

## 2022-09-20 RX ADMIN — Medication 1 TABLET(S): at 12:09

## 2022-09-20 RX ADMIN — QUETIAPINE FUMARATE 200 MILLIGRAM(S): 200 TABLET, FILM COATED ORAL at 23:55

## 2022-09-20 RX ADMIN — OXYCODONE AND ACETAMINOPHEN 1 TABLET(S): 5; 325 TABLET ORAL at 19:28

## 2022-09-20 RX ADMIN — Medication 25 MILLIGRAM(S): at 14:03

## 2022-09-20 RX ADMIN — Medication 2 MILLIGRAM(S): at 02:23

## 2022-09-20 RX ADMIN — Medication 2 MILLIGRAM(S): at 02:47

## 2022-09-20 RX ADMIN — Medication 1 SPRAY(S): at 18:13

## 2022-09-20 RX ADMIN — HALOPERIDOL DECANOATE 102 MILLIGRAM(S): 100 INJECTION INTRAMUSCULAR at 07:15

## 2022-09-20 RX ADMIN — APIXABAN 2.5 MILLIGRAM(S): 2.5 TABLET, FILM COATED ORAL at 18:12

## 2022-09-20 RX ADMIN — Medication 12.5 GRAM(S): at 19:25

## 2022-09-20 RX ADMIN — HALOPERIDOL DECANOATE 2.5 MILLIGRAM(S): 100 INJECTION INTRAMUSCULAR at 20:53

## 2022-09-20 RX ADMIN — Medication 15 GRAM(S): at 10:52

## 2022-09-20 RX ADMIN — Medication 50 MILLIGRAM(S): at 20:54

## 2022-09-20 RX ADMIN — MORPHINE SULFATE 0.5 MILLIGRAM(S): 50 CAPSULE, EXTENDED RELEASE ORAL at 21:21

## 2022-09-20 RX ADMIN — QUETIAPINE FUMARATE 50 MILLIGRAM(S): 200 TABLET, FILM COATED ORAL at 12:09

## 2022-09-20 RX ADMIN — Medication 25 MILLIGRAM(S): at 04:38

## 2022-09-20 RX ADMIN — OXYCODONE AND ACETAMINOPHEN 1 TABLET(S): 5; 325 TABLET ORAL at 17:35

## 2022-09-20 RX ADMIN — Medication 25 MILLIGRAM(S): at 05:40

## 2022-09-20 RX ADMIN — Medication 0.5 MILLIGRAM(S): at 18:12

## 2022-09-20 NOTE — ED ADULT NURSE REASSESSMENT NOTE - NS ED NURSE REASSESS COMMENT FT1
Checked on patient prior to discharge, pt found to be unresponsive to sternal rub. MD perry and MD Mandible at bedside assessing patient. Placed patient on 2LNC and end tidal. US guided 18G placed to L arm. Initial dose of 2mg IM ativan given to L Arm. No change in LOC. 2nd dose Ativan 2mg IM given as per MD Perry. Bedrails up, padding placed on bedrails. Stretcher in lowest position, safety measures in place. Neuro at bedside for further patient evaluation.
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
## 2022-09-20 NOTE — ED PROVIDER NOTE - NS ED ROS FT
Constitutional:  (-) fever, (-) chills, (-) fatigue  Eyes:  (+) R ear pain (-) eye pain (-) visual changes  ENMT: (-) nasal discharge, (-) sore throat. (-) neck pain or stiffness  Cardiac: (-) chest pain (-) palpitations  Respiratory:  (-) cough (-) shortness of breath  GI:  (-) nausea (-) vomiting (-) diarrhea (-) abdominal pain  :  (-) dysuria (-) frequency (-) burning  MS:  (-) back pain (-) joint pain  Neuro:  (-) headache (-) numbness (-) tingling (-) focal weakness  Skin:  (-) rash  Except as documented in the HPI,  all other systems are negative

## 2022-09-20 NOTE — H&P ADULT - PROBLEM SELECTOR PLAN 3
- Patient with hx DVT/PE  - previous dose of eliquis was 5mg PO BID, including on recent discharge from [facility] on 9/12/22  - for unclear reasons, patient placed on eliquis 2.5mg PO BID at group home  - per her group home RN, no known bleeding events recently  - will increase dose back to 5mg PO BID given hx DVT/PE in young patient with hx Factor V leiden - Patient with hx DVT/PE  - previous dose of eliquis was 5mg PO BID, including on recent discharge from [facility] on 9/12/22  - for unclear reasons, patient placed on eliquis 2.5mg PO BID at group home  - per her group home RN, no known bleeding events recently  - continue at home dose of 2.5mg PO BID - will need further collateral and discussion on whether to increase her dose back to 5mg PO BID

## 2022-09-20 NOTE — H&P ADULT - NEUROLOGICAL
limited neuro exam d/t lack of response from pt/normal/cranial nerves II-XII intact/sensation intact details… t/normal/cranial nerves II-XII intact/sensation intact

## 2022-09-20 NOTE — BH CONSULTATION LIAISON ASSESSMENT NOTE - OTHER
Group Home Resident Group Home Detached  Poverty of thought [place], impoverished .. seems confused " I am confused" normal at the time of interview withdrawn

## 2022-09-20 NOTE — ED ADULT NURSE REASSESSMENT NOTE - NS ED NURSE REASSESS COMMENT FT1
Break RN. Pt received in tr A, 37Y F. Pt received in entidal and cardiac monitor. Pending CT will continue to monitor.

## 2022-09-20 NOTE — H&P ADULT - NEGATIVE GENERAL GENITOURINARY SYMPTOMS
no hematuria/no renal colic/no flank pain L/no flank pain R/no urine discoloration/no incontinence/no dysuria/no urinary hesitancy/normal urinary frequency no hematuria/no flank pain L/no flank pain R/no dysuria/no urinary hesitancy/normal urinary frequency

## 2022-09-20 NOTE — H&P ADULT - NSHPLABSRESULTS_GEN_ALL_CORE
11.1   4.64  )-----------( 169      ( 20 Sep 2022 02:28 )             34.3   09-20    142  |  108<H>  |  9   ----------------------------<  90  4.4   |  23  |  0.85    Ca    9.5      20 Sep 2022 02:28  Phos  4.3     09-20  Mg     1.90     09-20    TPro  7.7  /  Alb  4.3  /  TBili  0.2  /  DBili  x   /  AST  17  /  ALT  20  /  AlkPhos  56  09-20    COVID PCR - negative 11.1   4.64  )-----------( 169      ( 20 Sep 2022 02:28 )             34.3   09-20    142  |  108<H>  |  9   ----------------------------<  90  4.4   |  23  |  0.85    Ca    9.5      20 Sep 2022 02:28  Phos  4.3     09-20  Mg     1.90     09-20    TPro  7.7  /  Alb  4.3  /  TBili  0.2  /  DBili  x   /  AST  17  /  ALT  20  /  AlkPhos  56  09-20    COVID PCR - negative    HCG < 5 negative    TropT < 6 negative     UA negative  UCx pending in lab    RVP negative     EKG: NSR @ 91bpm, QTc 479, non specific TWI avL, no acute ischemic changes, grossly unchanged compared to prior     Refused head CT in ED.   No other imaging.

## 2022-09-20 NOTE — CONSULT NOTE ADULT - ATTENDING COMMENTS
37 year-old right-handed woman with a PMH of obesity, factor V Leiden deficiency a/w DVT/PE (on Eliquis 5mg BID), bipolar disorder (on Abilify, Latuda, Seroquel and Trileptal), hypothyroidism, ?seizure disorder in childhood, electrographically confirmed psychogenic nonepileptic seizures (during [facility] EMU admission 7/8-7/11/22), brought from group home to the ED on 9/29/22 with c/o ear pain and body aches, found to be acutely unresponsive just prior to discharge, s/p ativan 2mg IM x2 due to concern for seizure, without improvement in mental status. Neurology consulted due to concern for possible status epilepticus. On evaluation patient noted to have her eyes closed, with slow asynchronous movements of the left shoulder, trunk and infrequently, the right foot. Gentle lowering of the LUE noted when dropped. Slight grimace and movement of the left hand into a fist noted with application of noxious stimuli to the right foot. Cessation of asynchronous movements noted upon completion of assessment, without further administration of medication. Per ED chart review, patient later awake, alert, verbal and refusing imaging and PO medications.     Impression: Likely psychogenic nonepileptic events.     No new AEDs  -No further inpatient Neurologic workup at this time

## 2022-09-20 NOTE — H&P ADULT - PROBLEM SELECTOR PLAN 2
- Patient reports mild RLQ abdominal/R pelvic pain since being in ED  - denies problems urinating or moving bowels  - UA appears to be negative, f/u UCx   - abdominal exam is benign and patient denies tenderness upon palpation   - will give Tylenol PRN and re-assess   - if persistent, consider CT A/P vs pelvic sonogram - Patient reports mild RLQ abdominal/R pelvic pain since being in ED  - denies problems urinating or moving bowels  - UA appears to be negative, f/u UCx   - abdominal exam is benign and patient denies tenderness upon palpation   - will give Tylenol PRN and re-assess   - if persistent/worsening symptoms, consider CT A/P vs pelvic sonogram

## 2022-09-20 NOTE — CONSULT NOTE ADULT - SUBJECTIVE AND OBJECTIVE BOX
Neurology Consult    YODIT MORROW  37y Female  MRN-[id number]      HPI:  37 year-old right-handed woman with a PMH of obesity, factor V Leiden deficiency a/w DVT/PE (on Eliquis 5mg BID), bipolar disorder (on Abilify, Latuda, Seroquel and Trileptal), hypothyroidism, possible seizure disorder in childhood, PNES, who presented to [facility] ED from [facility] on 22 with c/o ear pain and body aches. Patient was worked up in the ED and about to be cleared from discharge, when she suddenly became unresponsive, without shaking or jerking of the extremities. Due to concern for seizure, ativan 2mg IM x2 was administered over approximately 15 minutes, without improvement in mental status. Neurology was subsequently consulted due to concern for possible status epilepticus. On evaluation the patient was noted to have her eyes closed, with slow asynchronous movements of the left shoulder, trunk and infrequently, the right foot. Gentle lowering of the arm was noted the first time the LUE was dropped, but not on a second attempt. Slight grimace and movement of the left hand into a fist was noted with application of noxious stimuli to the right foot. Movements were noted to have stopped upon completion of assessment, without further administration of medication.       Of note, per chart review, the patient was admitted to [facility] EMU from -22 for characterization of events, noted to range from unresponsiveness without movement, to whole body shaking. At the time, she was on Depakote which was held, and noted to have several prolonged clinical events without associated electrographic abnormalities, most consistent with psychogenic nonepileptic seizures. She was initiated on Trileptal 150mg bid for mood. During that admission the patient was seen by Behavioral Health and per collateral from the patient's outpatient psychiatrist, it was determined that she had previously been on Depakote for her mood disorder, and not seizure prophylaxis.      REVIEW OF SYSTEMS:  Unable to obtain at time of evaluation due to mental status/unresponsiveness.        PAST MEDICAL & SURGICAL HISTORY:  Asthma      Seizure      Factor V Leiden      Bipolar disorder      Endometriosis      History of DVT in adulthood  RLE on eliquis      Hypothyroid      Seasonal allergies      Insomnia      GERD (gastroesophageal reflux disease)      HTN (hypertension)      Diabetes      DVT (deep venous thrombosis)      Factor V Leiden      No significant past surgical history      FAMILY HISTORY:  Family history of DVT (Mother)      SOCIAL HISTORY:   Lives in group home.       MEDICATIONS    Home Medications:  Med Rec Pending.    MEDICATIONS  (STANDING):  OXcarbazepine 150 milliGRAM(s) Oral once    MEDICATIONS  (PRN):      Allergies    latex (Blisters)  penicillin (Hives)  penicillin (Other)  pineapple (Unknown)  Toradol (Hives)  Toradol (Rash)  Toradol (Unknown)  trazodone (Other)  Zofran (Hives)  Zofran (Rash)  Zofran (Unknown)        VITALS & EXAMINATION    Height (cm): 172.7 (09- @ 18:51)    Vital Signs Last 24 Hrs  T(C): 37.1 (19 Sep 2022 23:22), Max: 37.1 (19 Sep 2022 23:22)  T(F): 98.7 (19 Sep 2022 23:22), Max: 98.7 (19 Sep 2022 23:22)  HR: 93 (20 Sep 2022 03:03) (92 - 110)  BP: 114/92 (20 Sep 2022 03:03) (112/77 - 135/96)  RR: 18 (20 Sep 2022 03:03) (16 - 23)  SpO2: 100% (20 Sep 2022 03:03) (98% - 100%)      General  - Constitutional: Obese W, laying in bed, eyes closed.   - HEENT: MMM.  - Respiratory: Saturating well on room air.      Neurological  - Mental Status: Eyes closed, noted to intermittently provide slight resistance to eye opening. No response to verbal stimuli.  Slight grimace and movement of the left hand into a fist noted with application of noxious stimuli to the right foot.  - Language: No verbal output at time of assessment.   - Cranial Nerves: PERRL (4mm OU). No facial asymmetry. Cough and gag deferred. Rest of CN exam is limited by mental status.   - Motor: Slow asynchronous movements of the left shoulder, trunk and infrequently, the right foot at time of evaluation, with cessation upon completion of evaluation. No withdrawal to noxious stimulation, however movement as above (left hand fist).   - Sensory: Minimal grimace to noxious stimulation.    - Reflexes: 1+ biceps, brachioradialis, triceps, patellar and achilles b/l. Plantar response flexor b/l.   - Coordination: Unable to assess.   - Gait: Unable to assess.       LABS:    CBC                       11.1   4.64  )-----------( 169      ( 20 Sep 2022 02:28 )             34.3     Chem     142  |  108<H>  |  9   ----------------------------<  90  4.4   |  23  |  0.85    Ca    9.5      20 Sep 2022 02:28  Mg     1.90         TPro  7.7  /  Alb  4.3  /  TBili  0.2  /  DBili  x   /  AST  17  /  ALT  20  /  AlkPhos  56      Coags   LFTs LIVER FUNCTIONS - ( 20 Sep 2022 02:28 )  Alb: 4.3 g/dL / Pro: 7.7 g/dL / ALK PHOS: 56 U/L / ALT: 20 U/L / AST: 17 U/L / GGT: x           Lipid Panel   A1c   Cardiac enzymes     U/A Urinalysis Basic - ( 20 Sep 2022 00:02 )    Color: Yellow / Appearance: Slightly Turbid / S.019 / pH: x  Gluc: x / Ketone: Negative  / Bili: Negative / Urobili: <2 mg/dL   Blood: x / Protein: Trace / Nitrite: Negative   Leuk Esterase: Negative / RBC: 1 /HPF / WBC 5 /HPF   Sq Epi: x / Non Sq Epi: 16 /HPF / Bacteria: Few        STUDIES & IMAGING   Neurology Consult    YODIT MORROW  37y Female  [id number]      HPI:  37 year-old right-handed woman with a PMH of obesity, factor V Leiden deficiency a/w DVT/PE (on Eliquis 5mg BID), bipolar disorder (on Abilify, Latuda, Seroquel and Trileptal), hypothyroidism, possible seizure disorder in childhood, PNES, who presented to [facility] ED from longterm on 22 with c/o ear pain and body aches. Patient was worked up in the ED and about to be cleared from discharge, when she suddenly became unresponsive, without shaking or jerking of the extremities. Due to concern for seizure, ativan 2mg IM x2 was administered over approximately 15 minutes, without improvement in mental status. Neurology was subsequently consulted due to concern for possible status epilepticus.     Of note, per chart review, the patient was admitted to [facility] EMU from -22 for characterization of events, noted to range from unresponsiveness without movement, to whole body shaking. At the time, she was on Depakote which was held, and noted to have several prolonged clinical events without associated electrographic abnormalities, most consistent with psychogenic nonepileptic seizures. She was initiated on Trileptal 150mg bid for mood. During that admission the patient was seen by Behavioral Health and per collateral from the patient's outpatient psychiatrist, it was determined that she had previously been on Depakote for her mood disorder, and not seizure prophylaxis.      REVIEW OF SYSTEMS:  Unable to obtain at time of evaluation due to mental status/unresponsiveness.        PAST MEDICAL & SURGICAL HISTORY:  Asthma      Seizure      Factor V Leiden      Bipolar disorder      Endometriosis      History of DVT in adulthood  RLE on eliquis      Hypothyroid      Seasonal allergies      Insomnia      GERD (gastroesophageal reflux disease)      HTN (hypertension)      Diabetes      DVT (deep venous thrombosis)      Factor V Leiden      No significant past surgical history      FAMILY HISTORY:  Family history of DVT (Mother)      SOCIAL HISTORY:   Lives in group home.       MEDICATIONS    Home Medications:  Med Rec Pending.    MEDICATIONS  (STANDING):  OXcarbazepine 150 milliGRAM(s) Oral once    MEDICATIONS  (PRN):      Allergies    latex (Blisters)  penicillin (Hives)  penicillin (Other)  pineapple (Unknown)  Toradol (Hives)  Toradol (Rash)  Toradol (Unknown)  trazodone (Other)  Zofran (Hives)  Zofran (Rash)  Zofran (Unknown)        VITALS & EXAMINATION    Height (cm): 172.7 ( @ 18:51)    Vital Signs Last 24 Hrs  T(C): 37.1 (19 Sep 2022 23:22), Max: 37.1 (19 Sep 2022 23:22)  T(F): 98.7 (19 Sep 2022 23:22), Max: 98.7 (19 Sep 2022 23:22)  HR: 93 (20 Sep 2022 03:03) (92 - 110)  BP: 114/92 (20 Sep 2022 03:03) (112/77 - 135/96)  RR: 18 (20 Sep 2022 03:03) (16 - 23)  SpO2: 100% (20 Sep 2022 03:03) (98% - 100%)      General  - Constitutional: Obese W, laying in bed, eyes closed.   - HEENT: MMM.  - Respiratory: Saturating well on room air.      Neurological  - Mental Status: Eyes closed, noted to intermittently provide slight resistance to eye opening. No response to verbal stimuli.  Slight grimace and movement of the left hand into a fist noted with application of noxious stimuli to the right foot.  - Language: No verbal output at time of assessment.   - Cranial Nerves: PERRL (4mm OU). No facial asymmetry. Cough and gag deferred. Rest of CN exam is limited by mental status.   - Motor: Slow asynchronous movements of the left shoulder, trunk and infrequently, the right foot at time of evaluation, with cessation upon completion of evaluation. No withdrawal to noxious stimulation, however movement as above (left hand fist).   - Sensory: Minimal grimace to noxious stimulation.    - Reflexes: 1+ biceps, brachioradialis, triceps, patellar and achilles b/l. Plantar response flexor b/l.   - Coordination: Unable to assess.   - Gait: Unable to assess.       LABS:    CBC                       11.1   4.64  )-----------( 169      ( 20 Sep 2022 02:28 )             34.3     Chem 09-20    142  |  108<H>  |  9   ----------------------------<  90  4.4   |  23  |  0.85    Ca    9.5      20 Sep 2022 02:28  Mg     1.90     09-    TPro  7.7  /  Alb  4.3  /  TBili  0.2  /  DBili  x   /  AST  17  /  ALT  20  /  AlkPhos  56  -    Coags   LFTs LIVER FUNCTIONS - ( 20 Sep 2022 02:28 )  Alb: 4.3 g/dL / Pro: 7.7 g/dL / ALK PHOS: 56 U/L / ALT: 20 U/L / AST: 17 U/L / GGT: x           Lipid Panel   A1c   Cardiac enzymes     U/A Urinalysis Basic - ( 20 Sep 2022 00:02 )    Color: Yellow / Appearance: Slightly Turbid / S.019 / pH: x  Gluc: x / Ketone: Negative  / Bili: Negative / Urobili: <2 mg/dL   Blood: x / Protein: Trace / Nitrite: Negative   Leuk Esterase: Negative / RBC: 1 /HPF / WBC 5 /HPF   Sq Epi: x / Non Sq Epi: 16 /HPF / Bacteria: Few        STUDIES & IMAGING   Neurology Consult    YODIT MORROW  37y Female  MRN-[id number]      HPI:  37 year-old right-handed woman with a PMH of obesity, factor V Leiden deficiency a/w DVT/PE (on Eliquis 5mg BID), bipolar disorder (on Abilify, Latuda, Seroquel and Trileptal), hypothyroidism, possible seizure disorder in childhood, PNES, who presented to [facility] ED from [facility] on 22 with c/o ear pain and body aches. Patient was worked up in the ED and about to be cleared from discharge, when she suddenly became unresponsive, without shaking or jerking of the extremities. Due to concern for seizure, ativan 2mg IM x2 was administered over approximately 15 minutes, without improvement in mental status. Neurology was subsequently consulted due to concern for possible status epilepticus. On evaluation the patient was noted to have her eyes closed, with slow asynchronous movements of the left shoulder, trunk and infrequently, the right foot. Gentle lowering of the arm was noted the first time the LUE was dropped, but not on a second attempt. Slight grimace and movement of the left hand into a fist was noted with application of noxious stimuli to the right foot. Movements were noted to have stopped upon completion of assessment, without further administration of medication.       Of note, per chart review, the patient was admitted to [facility] EMU from -22 for characterization of events, noted to range from unresponsiveness without movement, to whole body shaking. At the time, she was on Depakote which was held, and noted to have several prolonged clinical events without associated electrographic abnormalities, most consistent with psychogenic nonepileptic seizures. She was initiated on Trileptal 150mg bid for mood. During that admission the patient was seen by Behavioral Health and per collateral from the patient's outpatient psychiatrist, it was determined that she had previously been on Depakote for her mood disorder, and not seizure prophylaxis.      REVIEW OF SYSTEMS:  Unable to obtain at time of evaluation due to mental status/unresponsiveness.        PAST MEDICAL & SURGICAL HISTORY:  Asthma      Seizure      Factor V Leiden      Bipolar disorder      Endometriosis      History of DVT in adulthood  RLE on eliquis      Hypothyroid      Seasonal allergies      Insomnia      GERD (gastroesophageal reflux disease)      HTN (hypertension)      Diabetes      DVT (deep venous thrombosis)      Factor V Leiden      No significant past surgical history      FAMILY HISTORY:  Family history of DVT (Mother)      SOCIAL HISTORY:   Lives in group home.       MEDICATIONS    Home Medications:  Med Rec Pending.    MEDICATIONS  (STANDING):  OXcarbazepine 150 milliGRAM(s) Oral once    MEDICATIONS  (PRN):      Allergies    latex (Blisters)  penicillin (Hives)  penicillin (Other)  pineapple (Unknown)  Toradol (Hives)  Toradol (Rash)  Toradol (Unknown)  trazodone (Other)  Zofran (Hives)  Zofran (Rash)  Zofran (Unknown)        VITALS & EXAMINATION    Height (cm): 172.7 (09- @ 18:51)    Vital Signs Last 24 Hrs  T(C): 37.1 (19 Sep 2022 23:22), Max: 37.1 (19 Sep 2022 23:22)  T(F): 98.7 (19 Sep 2022 23:22), Max: 98.7 (19 Sep 2022 23:22)  HR: 93 (20 Sep 2022 03:03) (92 - 110)  BP: 114/92 (20 Sep 2022 03:03) (112/77 - 135/96)  RR: 18 (20 Sep 2022 03:03) (16 - 23)  SpO2: 100% (20 Sep 2022 03:03) (98% - 100%)      General  - Constitutional: Obese W, laying in bed, eyes closed.   - HEENT: MMM.  - Respiratory: Saturating well on room air.      Neurological  - Mental Status: Eyes closed, noted to intermittently provide slight resistance to eye opening. No response to verbal stimuli.  Slight grimace and movement of the left hand into a fist noted with application of noxious stimuli to the right foot.  - Language: No verbal output at time of assessment.   - Cranial Nerves: PERRL (4mm OU). No facial asymmetry. Cough and gag deferred. Rest of CN exam is limited by mental status.   - Motor: Slow asynchronous movements of the left shoulder, trunk and infrequently, the right foot at time of evaluation, with cessation upon completion of evaluation. No withdrawal to noxious stimulation, however movement as above (left hand fist).   - Sensory: Minimal grimace to noxious stimulation.    - Reflexes: 1+ biceps, brachioradialis, triceps, patellar and achilles b/l. Plantar response flexor b/l.   - Coordination: Unable to assess.   - Gait: Unable to assess.       LABS:    CBC                       11.1   4.64  )-----------( 169      ( 20 Sep 2022 02:28 )             34.3     Chem     142  |  108<H>  |  9   ----------------------------<  90  4.4   |  23  |  0.85    Ca    9.5      20 Sep 2022 02:28  Mg     1.90         TPro  7.7  /  Alb  4.3  /  TBili  0.2  /  DBili  x   /  AST  17  /  ALT  20  /  AlkPhos  56      Coags   LFTs LIVER FUNCTIONS - ( 20 Sep 2022 02:28 )  Alb: 4.3 g/dL / Pro: 7.7 g/dL / ALK PHOS: 56 U/L / ALT: 20 U/L / AST: 17 U/L / GGT: x           Lipid Panel   A1c   Cardiac enzymes     U/A Urinalysis Basic - ( 20 Sep 2022 00:02 )    Color: Yellow / Appearance: Slightly Turbid / S.019 / pH: x  Gluc: x / Ketone: Negative  / Bili: Negative / Urobili: <2 mg/dL   Blood: x / Protein: Trace / Nitrite: Negative   Leuk Esterase: Negative / RBC: 1 /HPF / WBC 5 /HPF   Sq Epi: x / Non Sq Epi: 16 /HPF / Bacteria: Few        STUDIES & IMAGING

## 2022-09-20 NOTE — H&P ADULT - PROBLEM SELECTOR PLAN 4
- Patient's Latuda and Abilify recently stopped per my discussion with patients RN Fidel, and 25mg dose of thorazine also DC'd  - she also has schizoaffective disorder   - continue home dose 50mg thorazine QID (7a, 11a, 4pm, 8pm)  - continue home dose seroquel 200mg 8am, 8pm, and 50mg 12 noon   - takes ativan 0.5mg PO BID as needed at group home, hold for the moment as patient is sleepy after haldol   - consider psychiatry evaluation given ongoing likely PNES and recent medication changes - Patient's Latuda and Abilify recently stopped per my discussion with patients RN Fidel, and 25mg dose of Thorazine also DC'd  - she also has schizoaffective disorder   - continue home dose 50mg Thorazine QID (7a, 11a, 4pm, 8pm)  - continue home dose Seroquel 200mg 8am, 8pm, and 50mg 12 noon   - takes ativan 0.5mg PO BID as needed at group home, hold for the moment as patient is sleepy after haldol   - consider psychiatry evaluation given ongoing likely PNES and recent medication changes - Patient's Latuda and Abilify recently stopped per my discussion with patients RN Fidel, and 25mg dose of Thorazine also DC'd  - she also has schizoaffective disorder   - continue home dose 50mg Thorazine QID (7a, 11a, 4pm, 8pm) with hold parameters   - continue home dose Seroquel 200mg 8am, 8pm, and 50mg 12 noon   - takes ativan 0.5mg PO BID as needed at group home, hold for the moment as patient is sleepy after haldol   - psychiatry evaluation called given ongoing likely PNES and recent medication changes

## 2022-09-20 NOTE — ED PROCEDURE NOTE - ATTENDING CONTRIBUTION TO CARE
I was physically present for the E/M service provided. I was physically present for the key portions of the service provided. REGGIE.

## 2022-09-20 NOTE — H&P ADULT - MENTAL STATUS
Pt is sleepy s/p IV Haldol 5mg, but was able to be aroused with verbal commands. Pt is A&Ox2, oriented to self and place, but does not know the date, month, year we are in and could not recall who the current president is. Pt is sleepy s/p IV Haldol 5mg, but was able to be aroused easily with verbal stimuli. Pt is A&Ox2, oriented to self and place, but does not know the date, month, year we are in and could not recall who the current president is. She follows commands appropriately.

## 2022-09-20 NOTE — RAPID RESPONSE TEAM SUMMARY - NSSITUATIONBACKGROUNDRRT_GEN_ALL_CORE
36 y/o F with a PMHx of Factor V Leiden deficiency a/w DVT/PE on Eliquis, history of seizure disorder in childhood, electrographically confirmed psychogenic nonepileptic seizures (during [facility] EMU admission 7/8-7/11/22), bipolar disorder, insomnia, asthma, diabetes, HTN, GERD, endometriosis, and hypothyroidism. Admitted with concerns for pseudoseizures. RRT called for agitation. On arrival, patient was emotional, but calm and NAD. Patient sitting on stretched in ED and non violent at time of arrival. Patient already ordered for haldol and thorazine and received medications shortly before. Patient was upset because she received IM medications. Patient refused vitals. Situation was able to be de-escalated through verbal means without use of chemical sedation or antipsychotics. Patient pending bed on medical floors.

## 2022-09-20 NOTE — H&P ADULT - PROBLEM SELECTOR PLAN 1
- Pt has been previously diagnosed with electrographically confirmed psychogenic nonepileptic seizures during admission at [facility] EMU (7/8-7/11/22)    - Order ECG  - - Pt has been previously diagnosed with electrographically confirmed psychogenic nonepileptic seizures during admission at [facility] EMU (7/8-7/11/22)    - Neurology evaluation appreciated and case discussed  - as per neurology, these are likely pseudoseizures and no further work up is required from neurological stand point at this time  - Neuro exam grossly non focal   - no further hypoxia noted after seizure episode - unclear if true hypoxia at that time vs poor signal on pulse ox?  - neuro checks; fall, seizure, and aspiration precautions   - monitor on telemetry   - Per neuro; hold further administration of benzodiazepines at this time  - If patient noted to have a generalized tonic clonic seizure lasting > 3 minutes or with severe derangement of [place], [place] [phone] per neuro  - continue home trileptal 150mg PO BID  - patient refused head CT in ED, neuro exam grossly non focal so will hold off for now and patient with known hx of PNES; if there is an acute change will obtain CTH - Pt has been previously diagnosed with electrographically confirmed psychogenic nonepileptic seizures during admission at [facility] EMU (7/8-7/11/22)    - Neurology evaluation appreciated and case discussed  - as per neurology, these are likely pseudoseizures and no further work up is required from neurological stand point at this time  - Neuro exam grossly non focal   - no further hypoxia noted after seizure episode - unclear if true hypoxia at that time vs poor signal on pulse ox?  - neuro checks; fall, seizure, and aspiration precautions   - monitor on telemetry   - Per neuro; hold further administration of benzodiazepines at this time  - If patient noted to have a generalized tonic clonic seizure lasting > 3 minutes or with severe derangement of VS, [place] [phone] per neuro  - continue home trileptal 150mg PO BID  - patient refused head CT in ED, she now appears more cooperative, so will re-order head CT for further evaluation however low suspicion of acute pathology as this is a known problem in past and neuro exam non focal

## 2022-09-20 NOTE — H&P ADULT - ASSESSMENT
38 y/o F with a PMHx of Factor V Leiden deficiency a/w DVT on Eliquis, history of seizure disorder in childhood, electrographically confirmed psychogenic nonepileptic seizures (during [facility] EMU admission 7/8-7/11/22), bipolar disorder, insomnia, asthma, diabetes, HTN, endometriosis, and hypothyroidism presents to the ED due to seizure-like activity, Pt will be admitted to medicine for further neurological evaluation.  38 y/o F with a PMHx of Factor V Leiden deficiency a/w DVT on Eliquis, history of seizure disorder in childhood, electrographically confirmed psychogenic nonepileptic seizures (during [facility] EMU admission 7/8-7/11/22), bipolar disorder, insomnia, asthma, diabetes, HTN, endometriosis, and hypothyroidism presents to the ED initially for body aches, right ear discomfort - now resolved with plan for discharge from ED, then with unresponsiveness followed by seizure activity while in the ED.

## 2022-09-20 NOTE — H&P ADULT - NSHPREVIEWOFSYSTEMS_GEN_ALL_CORE
ROS is limited as unable to obtain at time of evaluation due to mental status/sleepiness. ROS above, limited as patient is a poor historian and was sleepy after getting haldol IV.

## 2022-09-20 NOTE — H&P ADULT - MUSCULOSKELETAL
normal/ROM intact/no joint swelling/no joint erythema/no joint warmth/no calf tenderness/strength 5/5 bilateral upper extremities/strength 5/5 bilateral lower extremities/no chest wall tenderness details… normal/ROM intact/no joint swelling/no joint erythema/no joint warmth/no calf tenderness/strength 5/5 bilateral upper extremities/strength 5/5 bilateral lower extremities

## 2022-09-20 NOTE — ED ADULT NURSE REASSESSMENT NOTE - NS ED NURSE REASSESS COMMENT FT1
Pt agitated, attempting to get out of bed after redirection attempts made. Pulling off Cardiac monitor wires and . MAR made aware, awaiting further orders.

## 2022-09-20 NOTE — BH CONSULTATION LIAISON ASSESSMENT NOTE - VIOLENCE RISK FACTORS:
Substance abuse/Affective dysregulation/Irritability Substance abuse/Affective dysregulation/Impulsivity/Irritability

## 2022-09-20 NOTE — ED ADULT NURSE REASSESSMENT NOTE - NS ED NURSE REASSESS COMMENT FT1
Pt appears restless, attempting to get out of stretcher, agitated but redirectable at this time with PCA at bedside for elopement risk. Pt requesting IPAD from [facility] at this time. ANM aware pt to have IPAD at this time for therapy. Pt appears restless, attempting to get out of stretcher, agitated but verbally redirectable at this time with PCA at bedside for elopement risk. Pt requesting IPAD from [facility] at this time. ANM aware pt to have IPAD at this time for therapy. Pt appears restless, attempting to get out of stretcher, agitated but verbally redirectable at this time with PCA at bedside for elopement risk. Pt requesting IPAD from [facility] at this time. ANM aware pt to have IPAD labeled with chart at this time for therapy. Pt appears restless, attempting to get out of stretcher, agitated but verbally redirectable at this time with PCA at bedside for elopement risk. Pt requesting IPAD from [facility] at this time. ANM aware pt to have IPAD at bedside at this time for therapy. IPAD labeled with chart label.

## 2022-09-20 NOTE — H&P ADULT - GASTROINTESTINAL
normal/soft/nontender/nondistended/normal active bowel sounds/no guarding/no rigidity/no organomegaly/no palpable stacey details…

## 2022-09-20 NOTE — ED PROVIDER NOTE - PHYSICAL EXAMINATION
Gen: NAD, AAOx3, non-toxic appearing  HEENT: NCAT, TM clear bilaterally, normal conjunctiva, oral mucosa moist  Lung: speaking in full sentences, good aeration bilaterally, lungs CTA b/l  CV: regular rate and rhythm. cap refill <2x. peripheral pulses 2+bilaterally   Abd: soft, ND, NT  MSK: no visible deformities  Neuro: No focal deficits  Skin: Intact  Psych: normal affect

## 2022-09-20 NOTE — CHART NOTE - NSCHARTNOTEFT_GEN_A_CORE
[employer] ACP COVERAGE    On chart review, noted that FS 56 at 18:19. Primary RN made aware, Dextrose 1/2 amp given at 19:25. Repeat FS 82->106.   Pt not on any insulin regimen however low PO intake 2/2 nausea.    Will continue monitoring closely.  ABILIO Cole PA-C  [id number]

## 2022-09-20 NOTE — CONSULT NOTE ADULT - ASSESSMENT
37 year-old right-handed woman with a PMH of obesity, factor V Leiden deficiency a/w DVT/PE (on Eliquis 5mg BID), bipolar disorder (on Abilify, Latuda, Seroquel and Trileptal), hypothyroidism, ?seizure disorder in childhood, electrographically confirmed psychogenic nonepileptic seizures (during [facility] EMU admission 7/8-7/11/22), brought from group home to the ED on 9/29/22 with c/o ear pain and body aches, found to be acutely unresponsive just prior to discharge, s/p ativan 2mg IM x2 due to concern for seizure, without improvement in mental status. Neurology consulted due to concern for possible status epilepticus. On evaluation patient noted to have her eyes closed, with slow asynchronous movements of the left shoulder, trunk and infrequently, the right foot. Gentle lowering of the LUE noted when dropped. Slight grimace and movement of the left hand into a fist noted with application of noxious stimuli to the right foot. Cessation of asynchronous movements noted upon completion of assessment, without further administration of medication. Per chart review, patient later awake, alert, verbal and refusing imaging and PO medications.     Impression: Likely psychogenic nonepileptic events.     Recommendations:   [] Continue with patient's home medications   [] Please hold further administration of benzodiazepines at this time  [] If patient noted to have a generalized tonic clonic seizure lasting > 3 minutes or with severe derangement of VS, please [place] at [phone].     Other:   [] Telemetry monitoring; Neurochecks/VS per unit protocol  [] Seizure, fall and aspiration precautions  [] Please note: if patient has a convulsion, please document accurately the time of onset, any derangement of vital signs, length of episode, and duration of postictal period.   --> Please describe what occurred and  specifically what the patient was doing, paying attention to progression of limb involvement (upper/lower; R/L) if any, eye opening vs closure, head turn, gaze deviation, shaking of extremities, tongue bite, urinary/bowel incontinence.  [] Patient should follow-up with her Neurologist and Psychiatrist upon discharge.       To be formally staffed in AM, and seen by Neurology attending, Dr. Sanches.    37 year-old right-handed woman with a PMH of obesity, factor V Leiden deficiency a/w DVT/PE (on Eliquis 5mg BID), bipolar disorder (on Abilify, Latuda, Seroquel and Trileptal), hypothyroidism, ?seizure disorder in childhood, electrographically confirmed psychogenic nonepileptic seizures (during [facility] EMU admission 7/8-7/11/22), brought from group home to the ED on 9/29/22 with c/o ear pain and body aches, found to be acutely unresponsive just prior to discharge, s/p ativan 2mg IM x2 due to concern for seizure, without improvement in mental status. Neurology consulted due to concern for possible status epilepticus. On evaluation patient noted to have her eyes closed, with slow asynchronous movements of the left shoulder, trunk and infrequently, the right foot. Gentle lowering of the LUE noted when dropped. Slight grimace and movement of the left hand into a fist noted with application of noxious stimuli to the right foot. Cessation of asynchronous movements noted upon completion of assessment, without further administration of medication. Per ED chart review, patient later awake, alert, verbal and refusing imaging and PO medications.     Impression: Likely psychogenic nonepileptic events.     Recommendations:   [] Continue with patient's home medications   [] Please hold further administration of benzodiazepines at this time  [] If patient noted to have a generalized tonic clonic seizure lasting > 3 minutes or with severe derangement of VS, please call spectra at [phone].     Other:   [] Telemetry monitoring; Neurochecks/VS per unit protocol  [] Seizure, fall and aspiration precautions  [] Please note: if patient has a convulsion, please document accurately the time of onset, any derangement of vital signs, length of episode, and duration of postictal period.   --> Please describe what occurred and  specifically what the patient was doing, paying attention to progression of limb involvement (upper/lower; R/L) if any, eye opening vs closure, head turn, gaze deviation, shaking of extremities, tongue bite, urinary/bowel incontinence.  [] Patient should follow-up with her Neurologist and Psychiatrist upon discharge.       To be formally staffed in AM, and seen by Neurology attending, Dr. Sanches.

## 2022-09-20 NOTE — H&P ADULT - NEGATIVE ENMT SYMPTOMS
no hearing difficulty/no ear pain/no tinnitus/no nasal congestion/no nasal discharge/no dry mouth/no throat pain no hearing difficulty/no ear pain/no tinnitus/no nasal congestion/no nasal discharge/no throat pain

## 2022-09-20 NOTE — H&P ADULT - RESPIRATORY
normal/clear to auscultation bilaterally/no wheezes/no rales/no rhonchi/no use of accessory muscles/breath sounds equal/respirations non-labored/no intercostal retractions normal/clear to auscultation bilaterally/no wheezes/no rales/no rhonchi/no use of accessory muscles/breath sounds equal/good air movement/respirations non-labored/no intercostal retractions

## 2022-09-20 NOTE — H&P ADULT - NS ATTEND AMEND GEN_ALL_CORE FT
HPI-  38 y/o F with a PMHx of Factor V Leiden deficiency, Hx DVT /PE (on Eliquis), hx seizure disorder in childhood, electrographically confirmed psychogenic nonepileptic seizures (during [facility] EMU admission 7/8-7/11/22), bipolar disorder, insomnia, asthma, diabetes, HTN, GERD, endometriosis, and hypothyroidism presented to the ED for ear pain. Upon discharge, pt noted to have episode of unresponsiveness and given 2 doses of IM Ativan.     Evaluated by Neuro and no further neurological work up needed. Symptoms likely 2/2 psychogenic nonepileptic seizures.  No new AEDs as per psych.   Pt refused CT head    Patient seen and examined. She is sleepy but able to wake up and follow simple commands and answer most questions. She is AOx2, oriented to self and place. She does not recall the events that took place in ED. She reports feeling sleepy.     continue home medications  Vital signs and neuro checks. Seizure and aspiration precautions.     Labs remarkable for hgb11 and hypoglycemia    PHYSICAL EXAM:  GENERAL: NAD, well-developed, somnolent   HEAD:  Atraumatic, Normocephalic  EYES: EOMI, PERRLA, conjunctiva and sclera clear  NECK: Supple, No elevated JVD  CHEST/LUNG: Clear to auscultation bilaterally; No wheeze  HEART: Regular rate and rhythm; No murmurs, rubs, or gallops  ABDOMEN: Soft, Nontender, Nondistended; Bowel sounds present  EXTREMITIES:  2+ Peripheral Pulses, No clubbing, cyanosis, or edema  PSYCH: AAOx2, can follow simple commands and answer questions.   NEUROLOGY: CN II-XII grossly intact, moving all extremities  SKIN: No rashes or lesions    Plan:   # Psychogenic seizures, hx of seizures  no new AEDs, continue current medications. No neurological work up inpatient. ordered CT head    #Bipolar disorder  currently on Thorazine. will continue but will have psych eval current medications.     #Factor V Leiden, hx of DVT/PE  Currently on Eliquis 2.5mg BID, previously on 5mg BID    #Hypothyroidism  continue Levothyroxine     #hx of chronic UTI  continue Bactrim for now. will need to have pt follow up with urologist outpt to reevaluate need for it.     I have personally seen and examined patient. I discussed the case with ACP and agree with findings and plan as per note above. HPI-  36 y/o F with a PMHx of Factor V Leiden deficiency, Hx DVT /PE (on Eliquis), hx seizure disorder in childhood, electrographically confirmed psychogenic nonepileptic seizures (during [facility] EMU admission 7/8-7/11/22), bipolar disorder, insomnia, asthma, diabetes, HTN, GERD, endometriosis, and hypothyroidism presented to the ED for ear pain. Upon discharge, pt noted to have episode of unresponsiveness and given 2 doses of IM Ativan.     Evaluated by Neuro and no further neurological work up needed. Symptoms likely 2/2 psychogenic nonepileptic seizures.  No new AEDs as per psych.   Pt refused CT head    Patient seen and examined. She is sleepy but able to wake up and follow simple commands and answer most questions. She is AOx2, oriented to self and place. She does not recall the events that took place in ED. She reports feeling sleepy.     continue home medications  Vital signs and neuro checks. Seizure and aspiration precautions.     Labs remarkable for hgb11 and hypoglycemia    PHYSICAL EXAM:  GENERAL: NAD, well-developed, somnolent   HEAD:  Atraumatic, Normocephalic  EYES: EOMI, PERRLA, conjunctiva and sclera clear  NECK: Supple, No elevated JVD  CHEST/LUNG: Clear to auscultation bilaterally; No wheeze  HEART: Regular rate and rhythm; No murmurs, rubs, or gallops  ABDOMEN: Soft, Nontender, Nondistended; Bowel sounds present  EXTREMITIES:  2+ Peripheral Pulses, No clubbing, cyanosis, or edema  PSYCH: AAOx2, can follow simple commands and answer questions.   NEUROLOGY: CN II-XII grossly intact, moving all extremities  SKIN: No rashes or lesions    Plan:   # Psychogenic seizures, hx of seizures  no new AEDs, continue current medications. No neurological work up inpatient. ordered CT head    #Bipolar disorder  currently on Thorazine. will continue but will have psych eval current medications.     #Factor V Leiden, hx of DVT/PE  Currently on Eliquis 2.5mg BID, previously on 5mg BID. confirmed with Group home, [facility]- manager, states pt's med was changed on 8/18 by Dr.Anshu Ly, hematologist at [facility] hematology clinic, phone number [phone]. will continue current regimen for now. Can follow up outpt with hematology after discharge    #Hypothyroidism  continue Levothyroxine     #hx of chronic UTI  continue Bactrim for now. will need to have pt follow up with urologist outpt to reevaluate need for it.     I have personally seen and examined patient. I discussed the case with ACP and agree with findings and plan as per note above. HPI-  36 y/o F with a PMHx of Factor V Leiden deficiency, Hx DVT /PE (on Eliquis), hx seizure disorder in childhood, electrographically confirmed psychogenic nonepileptic seizures (during [facility] EMU admission 7/8-7/11/22), bipolar disorder, insomnia, asthma, diabetes, HTN, GERD, endometriosis, and hypothyroidism presented to the ED for ear pain. Upon discharge, pt noted to have episode of unresponsiveness and given 2 doses of IM Ativan.     Evaluated by Neuro and no further neurological work up needed. Symptoms likely 2/2 psychogenic nonepileptic seizures.  No new AEDs as per psych.   Pt refused CT head    Patient seen and examined. She is sleepy but able to wake up and follow simple commands and answer most questions. She is AOx2, oriented to self and place. She does not recall the events that took place in ED. She reports feeling sleepy.     continue home medications  Vital signs and neuro checks. Seizure and aspiration precautions.     Labs remarkable for hgb11 and hypoglycemia    PHYSICAL EXAM:  GENERAL: NAD, well-developed, somnolent   HEAD:  Atraumatic, Normocephalic  EYES: EOMI, PERRLA, conjunctiva and sclera clear  NECK: Supple, No elevated JVD  CHEST/LUNG: Clear to auscultation bilaterally; No wheeze  HEART: Regular rate and rhythm; No murmurs, rubs, or gallops  ABDOMEN: Soft, Nontender, Nondistended; Bowel sounds present  EXTREMITIES:  2+ Peripheral Pulses, No clubbing, cyanosis, or edema  PSYCH: AAOx2, can follow simple commands and answer questions.   NEUROLOGY: CN II-XII grossly intact, moving all extremities  SKIN: No rashes or lesions    Plan:   # Psychogenic seizures, hx of seizures  no new AEDs, continue current medications. No neurological work up inpatient. ordered CT head    #Bipolar disorder  currently on Thorazine. will continue but will have psych eval current medications.     #Factor V Leiden, hx of DVT/PE  Currently on Eliquis 2.5mg BID, previously on 5mg BID. confirmed with Group home, [facility]- manager, states pt's med was changed on 8/18/22 by Dr.Anshu Ly, hematologist at [facility] hematology clinic, phone number [phone]. will continue current regimen for now. Can follow up outpt with hematology after discharge    #Hypothyroidism  continue Levothyroxine     #hx of chronic UTI  continue Bactrim for now. will need to have pt follow up with urologist outpt to reevaluate need for it.     I have personally seen and examined patient. I discussed the case with ACP and agree with findings and plan as per note above.

## 2022-09-20 NOTE — H&P ADULT - NECK
normal/supple/symmetric/no tracheal deviation/no thyromegaly/no palpable masses normal/supple/symmetric/no thyromegaly/no palpable masses

## 2022-09-20 NOTE — H&P ADULT - NEGATIVE MUSCULOSKELETAL SYMPTOMS
no joint swelling/no myalgia/no muscle cramps/no muscle weakness/no stiffness no joint swelling/no myalgia/no muscle weakness/no stiffness/no back pain

## 2022-09-20 NOTE — H&P ADULT - NSICDXPASTMEDICALHX_GEN_ALL_CORE_FT
PAST MEDICAL HISTORY:  Asthma     Bipolar disorder     Diabetes     Endometriosis     Factor V Leiden     GERD (gastroesophageal reflux disease)     History of DVT in adulthood RLE on eliquis    HTN (hypertension)     Hypothyroid     Insomnia     Seasonal allergies     Seizure

## 2022-09-20 NOTE — ED ADULT NURSE REASSESSMENT NOTE - NS ED NURSE REASSESS COMMENT FT1
Patient blood glucose 59 with repeat blood sugar 64. Provider notified and oral glucose gel given. Recheck blood glucose is still 64. Another glucose gel given and provider notified. Repeat glucose 79. As per provider repeat blood sugar in 30 minutes. Patient remains asymptomatic

## 2022-09-20 NOTE — H&P ADULT - NEGATIVE CARDIOVASCULAR SYMPTOMS
no chest pain/no palpitations/no peripheral edema/no claudication no chest pain/no palpitations/no dyspnea on exertion/no peripheral edema

## 2022-09-20 NOTE — H&P ADULT - PROBLEM SELECTOR PLAN 8
- patient appears to be chronically on bactrim DS 1 tab daily, with reglan prior and probiotic   - per her RN at facility, possibly in setting of recurrent U.T.I and was recommended by a urologist  - will need to try to get more collateral from PMD; continue bactrim DS 1 tab daily (hold reglan for now)  - follow up urine culture

## 2022-09-20 NOTE — BH CONSULTATION LIAISON ASSESSMENT NOTE - CURRENT MEDICATION
MEDICATIONS  (STANDING):  apixaban 2.5 milliGRAM(s) Oral every 12 hours  chlorproMAZINE    Tablet 50 milliGRAM(s) Oral <User Schedule>  dextrose 5%. 1000 milliLiter(s) (100 mL/Hr) IV Continuous <Continuous>  dextrose 5%. 1000 milliLiter(s) (50 mL/Hr) IV Continuous <Continuous>  dextrose 50% Injectable 25 Gram(s) IV Push once  dextrose 50% Injectable 12.5 Gram(s) IV Push once  dextrose 50% Injectable 25 Gram(s) IV Push once  fluticasone propionate 50 MICROgram(s)/spray Nasal Spray 1 Spray(s) Both Nostrils two times a day  lactobacillus acidophilus 1 Tablet(s) Oral daily  levothyroxine 75 MICROGram(s) Oral daily  melatonin 3 milliGRAM(s) Oral at bedtime  multivitamin 1 Tablet(s) Oral daily  OXcarbazepine 150 milliGRAM(s) Oral two times a day  pantoprazole    Tablet 40 milliGRAM(s) Oral before breakfast  QUEtiapine 50 milliGRAM(s) Oral <User Schedule>  QUEtiapine 200 milliGRAM(s) Oral <User Schedule>    MEDICATIONS  (PRN):  acetaminophen     Tablet .. 650 milliGRAM(s) Oral every 6 hours PRN Severe Pain (7 - 10)  dextrose Oral Gel 15 Gram(s) Oral once PRN Blood Glucose LESS THAN 70 milliGRAM(s)/deciliter  dextrose Oral Gel 15 Gram(s) Oral once PRN Blood Glucose LESS THAN 70 milliGRAM(s)/deciliter  polyethylene glycol 3350 17 Gram(s) Oral daily PRN Constipation

## 2022-09-20 NOTE — H&P ADULT - PROBLEM SELECTOR PLAN 5
- Patient on no meds  - patient actually hypoglycemic in ED to 59, was not symptomatic per RN   - given oral glucose with improvement 64; will give amp of D50x 1; will check repeatedly until above 100  - hold sliding scale for now   - continue to monitor FS closely   - diet started   - check HgA1c

## 2022-09-20 NOTE — PATIENT PROFILE ADULT - FALL HARM RISK - HARM RISK INTERVENTIONS
Assistance with ambulation/Assistance OOB with selected safe patient handling equipment/Communicate Risk of Fall with Harm to all staff/Reinforce activity limits and safety measures with patient and family/Tailored Fall Risk Interventions/Toileting schedule using arm’s reach rule for commode and bathroom/Use of alarms - bed, chair and/or voice tab/Visual Cue: Yellow wristband and red socks/Bed in lowest position, wheels locked, appropriate side rails in place/Call bell, personal items and telephone in reach/Instruct patient to call for assistance before getting out of bed or chair/Non-slip footwear when patient is out of bed/[place] to call system/Physically safe environment - no spills, clutter or unnecessary equipment/Purposeful Proactive Rounding/Room/bathroom lighting operational, light cord in reach

## 2022-09-20 NOTE — RAPID RESPONSE TEAM SUMMARY - NSREASONFORCALLINGRRT_GEN_ALL_CORE
Staff concern Complex Repair And Dermal Autograft Text: The defect edges were debeveled with a #15 scalpel blade.  The primary defect was closed partially with a complex linear closure.  Given the location of the defect, shape of the defect and the proximity to free margins an dermal autograft was deemed most appropriate to repair the remaining defect.  The graft was trimmed to fit the size of the remaining defect.  The graft was then placed in the primary defect, oriented appropriately, and sutured into place.

## 2022-09-20 NOTE — ED ADULT NURSE REASSESSMENT NOTE - NS ED NURSE REASSESS COMMENT FT1
Received pt @20:00. Pt alert, responsive to all stimuli, but agitated. 1:1 @ bedside. Pt became violent, hitting equipment, and ambulating against caregivers' advice. Security & CN were notified. Pt was medicated with Haldol + Thorazine IM as ordered, but subsequently became further agitated. RRT was called. Pt then medicated with morphine & benadryl as ordered. Report provided to inpatient ALEYDA Chacko, and pt was transported to unit with team. Safety maintained.

## 2022-09-20 NOTE — ED PROVIDER NOTE - OBJECTIVE STATEMENT
38 yo F with hx of Factor V leiden deficiency a/w dvt/pe, on Eliquis, ?seizures vs pseudoseizures on Trileptal presenting with body aches and ear pain. 38 yo F with hx of Factor V leiden deficiency a/w dvt/pe, on Eliquis, ?seizures vs pseudoseizures on Trileptal presenting with body aches and ear pain. Reports ongoing for the last week. Denies fevers/chills, chest pain, shortness of breath, cough. No known sick contacts. Has had mild pain with urination, denies hematuria, abdominal pain, back pain.

## 2022-09-20 NOTE — H&P ADULT - CARDIOVASCULAR
normal/regular rate and rhythm/S1 S2 present/no gallops/no rub/no murmur/no pedal edema details… normal/regular rate and rhythm/S1 S2 present/no gallops/no rub/no murmur/no pedal edema/vascular

## 2022-09-20 NOTE — ED PROVIDER NOTE - PROGRESS NOTE DETAILS
Patient unresponsive to all stimuli. No tonic-clonic movements noted. Per record review pt has PMH of seizure d/o. Pt had admission to EMU at [facility] in July 2022 during which she required several rounds of ativan for seizure like activity but never displayed any seizures on EEG monitoring. Will establish IV access. IM ativan 2 mg given x2. REGGIE. Patient was transiently awake then had a 1 minute episode of tonic-clonic activity a/w hypoxia down to 70% which resolved without attention. REGGIE. Pt awake on stretcher outside CT stating she does not want a CT scan. Pt had a normal scam July 2022. Will defer at this time. REGGIE. Herbie, PGY3: Spoke with hospitalist regarding admission. Given this is likely patient baseline discussed reassessment prior to admission. Will reassess at @0500. Patient reports itchiness of face for which she takes benadryl at the group home. Patient offered Benadryl PO states "I can't take pills right now". Unable to explain why. Patient attempted and spit out water and held oral medication in her mouth. Patient now crying and smacking her face. Will give IV benadryl and reassess. Patient was transiently awake then had a 1 minute episode of tonic-clonic activity a/w hypoxia down to 70% which resolved without intervention. CINTIA Pt had another episode of tonic-clonic movement and LOC (core moving) lasting 2 minutes. End-tidal monitoring. 1:1. REGGIE. Pt had another episode of tonic-clonic movement and LOC (core moving) lasting 2 minutes. End-tidal monitoring. 1:1. Patient declined to take Trileptal earlier. REGGIE. Pt had another episode of tonic-clonic movement and LOC (core moving) lasting 2 minutes. End-tidal monitoring. 1:1 for elopement precaution. Patient declined to take Trileptal earlier. REGGIE.

## 2022-09-20 NOTE — ED ADULT NURSE REASSESSMENT NOTE - NS ED NURSE REASSESS COMMENT FT1
pt Aox4, refused CT and US IV no longer working, removed MD Galindo aware. Pt attempting to leave room and walk around multiple times. Pt placed on 1:1 for elopement. Belongings checked and taken to security, sheet placed in Pt chart. x2 labeled bags placed outside rm #[id number]. PCA remains at bedside.

## 2022-09-20 NOTE — H&P ADULT - NEGATIVE OPHTHALMOLOGIC SYMPTOMS
no diplopia/no photophobia/no blurred vision L/no blurred vision R/no pain L/no pain R/no irritation L/no irritation R/no loss of vision L/no loss of vision R no diplopia/no photophobia/no blurred vision L/no blurred vision R/no pain L/no pain R/no loss of vision L/no loss of vision R

## 2022-09-20 NOTE — H&P ADULT - HISTORY OF PRESENT ILLNESS
36 y/o F with a PMHx of Factor V Leiden deficiency a/w DVT on Eliquis, history of seizure disorder in childhood, electrographically confirmed psychogenic nonepileptic seizures (during [facility] EMU admission 7/8-7/11/22), bipolar disorder, insomnia, asthma, diabetes, HTN, GERD, endometriosis, and hypothyroidism presents to the ED due to seizure-like activity. Pt was originally brought to the ED from psychiatric group home on 9/19/22 presenting with ear pain and body aches. Pt was about to be discharged and was agitated and worked up in the ED, when she suddenly became unresponsive without shaking or jerking of the extremities. Due to concern for seizure, ativan 2mg IM x2 was administered over approximately 15 minutes, without improvement in mental status. Neurology was subsequently consulted due to concern for possible status epilepticus. As per Neurology consult note, on evaluation the patient was noted to have her eyes closed, with slow asynchronous movements of the left shoulder, trunk and infrequently, the right foot. Gentle lowering of the arm was noted the first time the LUE was dropped, but not on a second attempt. Slight grimace and movement of the left hand into a fist was noted with application of noxious stimuli to the right foot. Movements were noted to have stopped upon completion of assessment, without further administration of medication. Pt states that she has mild pain located in the RLQ/pelvic area and denies any other associated sx. Pt states that the pain started when she was admitted into the ED on 9/20. Currently, pt states that she has no other acute complaints or injuries. Denies fever, chills, night sweats, headache, vision changes, hearing changes, rhinorrhea, sore throat, cough, hemoptysis, wheezing, pleuritic pain, chest pain, palpitations, edema, claudications, N/V, constipation, diarrhea, hematochezia, melena, urinary frequency/urgency, urinary pain/burning, muscle or joint pain/stiffness, back pain, rashes, dizziness, numbness/tingling. 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 Pt was originally brought to the ED from psychiatric group home on 9/19/22 presenting with right ear pain and body aches. Pt was about to be discharged however upon re-assessment became unresponsive without shaking or jerking of the extremities. Due to concern for seizure, ativan 2mg IM x2 was administered over approximately 15 minutes, without improvement in mental status. Later, patient then had 1 minute episode of tonic clonic movements with hypoxia in the 70s. Neurology was then consulted. Patient later seen ambulating and was agitated with concern for elopement so placed on 1:1 observation and given haldol 5mg IV in ED. On my arrival, patient sleeping but easily arousable to verbal stimuli. Woke up and was able to answer simple yes or no questions. She was AAO x 2 (self and place, but could not remember year/month). Patient only complaints of mild pain located in the RLQ/pelvic area and denies any other associated sx that she says started when she got to ED. She couldn't elaborate further on quality of pain. Currently, pt states that she has no other acute complaints or injuries. Denies fever, chills, night sweats, headache, vision changes, hearing changes, rhinorrhea, sore throat, cough, hemoptysis, wheezing, pleuritic pain, chest pain, palpitations, edema, claudications, N/V, constipation, diarrhea, hematochezia, melena, urinary frequency/urgency, urinary pain/burning, muscle or joint pain/stiffness, back pain, rashes, dizziness, numbness/tingling. While in the ED, she refused to get CT head. She was also complaining of itching, which is chronic, and was given benadryl IV and PO. She also refused her Tegretol. Her vital signs were otherwise stable.     Some collateral information regarding medications obtained from patients residential home [facility] at [phone]. Patient's 25mg thorazine TID was recently DC'd. She remains on Thorazine 50mg QID around the clock. Her latuda 40mg daily and abilify were also stopped on 9/13 as per Fidel. I asked him why her eliquis was changed to 2.5mg BID from 5mg BID and he was not sure, states there was no recent bleeding events. When asked about why she takes daily bactrim DS, he states he was told the urologist recommended it for hx of frequent urinary tract infections and high WBC in urine. She has been on it for a while now (it was not meant to be just a short course).  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 EMU admission 7/8-7/11/22), bipolar disorder, insomnia, asthma, diabetes, HTN, GERD, endometriosis, and hypothyroidism presents to the ED for ear pain.      Pt was originally brought to the ED from psychiatric group home on 9/19/22 presenting with right ear pain and body aches. Pt was about to be discharged however upon re-assessment became unresponsive without shaking or jerking of the extremities. Due to concern for seizure, ativan 2mg IM x2 was administered over approximately 15 minutes, without improvement in mental status.   Later, patient then had 1 minute episode of tonic clonic movements with hypoxia in the 70s. Neurology was then consulted. Patient later seen ambulating and was agitated with concern for elopement so placed on 1:1 observation and given haldol 5mg IV in ED. On my arrival, patient sleeping but easily arousable to verbal stimuli. Woke up and was able to answer simple yes or no questions. She was AAO x 2 (self and place, but could not remember year/month). Patient only complaints of mild pain located in the RLQ/pelvic area and denies any other associated sx that she says started when she got to ED. She couldn't elaborate further on quality of pain. Currently, pt states that she has no other acute complaints or injuries.      While in the ED, she refused to get CT head. She was also complaining of itching, which is chronic, and was given benadryl IV and PO. She also refused her Trileptal.   Her vital signs were otherwise stable.     Some collateral information regarding medications obtained from patients residential home [facility] at [phone]. Patient's 25mg Thorazine TID was recently DC'd. She remains on Thorazine 50mg QID around the clock. Her latuda 40mg daily and abilify were also stopped on 9/13 as per Fidel. I asked him why her Eliquis was changed to 2.5mg BID from 5mg BID and he was not sure, states there was no recent bleeding events. When asked about why she takes daily bactrim DS, he states he was told the urologist recommended it for hx of frequent urinary tract infections and high WBC in urine. She has been on it for a while now (it was not meant to be just a short course).

## 2022-09-20 NOTE — BH CONSULTATION LIAISON ASSESSMENT NOTE - NSBHCHARTREVIEWVS_PSY_A_CORE FT
Vital Signs Last 24 Hrs  T(C): 36.7 (20 Sep 2022 12:40), Max: 37.1 (19 Sep 2022 23:22)  T(F): 98.1 (20 Sep 2022 12:40), Max: 98.7 (19 Sep 2022 23:22)  HR: 85 (20 Sep 2022 12:40) (85 - 110)  BP: 126/74 (20 Sep 2022 12:40) (112/77 - 135/96)  BP(mean): --  RR: 18 (20 Sep 2022 12:40) (16 - 23)  SpO2: 100% (20 Sep 2022 12:40) (98% - 100%)    Parameters below as of 20 Sep 2022 12:40  Patient On (Oxygen Delivery Method): nasal cannula  O2 Flow (L/min): 2

## 2022-09-20 NOTE — BH CONSULTATION LIAISON ASSESSMENT NOTE - SUMMARY
36 y/o F with a PMHx of Factor V Leiden deficiency a/w DVT/PE in 2016 on Eliquis, history of seizure disorder in childhood, electrographically confirmed psychogenic nonepileptic seizures (during [facility] EMU admission 7/8-7/11/22), bipolar disorder, insomnia, asthma, diabetes, HTN, GERD, endometriosis, and hypothyroidism presents to the ED for ear pain. On MSE, Pt was confused, lethargic but arousable to verbal stimuli, and AAOx1 and did not know where she was or what month or year this was. She was unable to describe why and how she came to the hospital. Collateral information was obtained from Pt's psychiatrist and nurse and manager at the group home. They stated that her non-responsiveness was uncharacteristic and at baseline, Pt is interactive, animated, and pleasant. Pt has a history of medication-seeking behaviors for benzodiazepines, benadryl, and tylenol. In the past, she has stated that she was in pain just to obtain medications, but at other times, has been in true pain from endometriosis. Pt has a past history of stressor-induced psychogenic nonepileptic seizures and bipolar disorder.      PLAN  --Continue Patient's current psychiatric regimen: Chlorpromazine 50mg 4x/day, seroquel 50mg 1x/day, seroquel 200mg 2x/day, trileptal 150mg 2x a day as needed  --Primary team to do medication reconciliation for Ativan 0.5mg PRN   --Add Halidol 2.5mg q4 PO/IM/IV as needed. Please note, Halidol can lower threshold for seizures, but low concerns for this as per Neuro note and verification with Primary Team, Pt does not have epileptic seizure diagnosis     36 y/o F with a PMHx of Factor V Leiden deficiency a/w DVT/PE in 2016 on Eliquis, history of seizure disorder in childhood, electrographically confirmed psychogenic nonepileptic seizures (during [facility] EMU admission 7/8-7/11/22), bipolar disorder, insomnia, asthma, diabetes, HTN, GERD, endometriosis, and hypothyroidism presents to the ED for ear pain. On MSE, Pt was confused, lethargic but arousable to verbal stimuli, and AAOx1 and did not know where she was or what month or year this was. She was unable to describe why and how she came to the hospital. Collateral information was obtained from Pt's psychiatrist and nurse and manager at the group home. They stated that her non-responsiveness was uncharacteristic and at baseline, Pt is interactive, animated, and pleasant. Pt has a history of medication-seeking behaviors for benzodiazepines, benadryl, and tylenol. In the past, she has stated that she was in pain just to obtain medications, but at other times, has been in true pain from endometriosis. Pt has a past history of stressor-induced psychogenic nonepileptic seizures and bipolar disorder.      PLAN  --Continue Patient's current psychiatric regimen: Chlorpromazine 50mg 4x/day, seroquel 50mg 1x/day, seroquel 200mg 2x/day, trileptal 150mg 2x a day as needed, Ativan 0.5mg BID PRN  --Add Halidol 2.5mg q4 PO/IM/IV as needed. Please note, Halidol can lower threshold for seizures, but low concerns for this as per Neuro note and verification with Primary Team, Pt does not have epileptic seizure diagnosis     36 y/o F with a PMHx of Factor V Leiden deficiency a/w DVT/PE in 2016 on Eliquis, history of seizure disorder in childhood, h/o psychogenic nonepileptic seizures  as per chart h/o bipolar disorder, intellectual disability,  personality d/o, mood d/o, multiple inpatient psychiatric admissions most recent [place] 7/20-8/21, hx crack/cocaine use dos, prior legal issues (weapons possession, harassment charges), two prior SA (overdose 2017),  asthma, diabetes, HTN, GERD, endometriosis, and hypothyroidism presents to the ED for ear pain. Consulted fro medication management    On MSE, Pt was confused, lethargic but arousable to verbal stimuli, and AAOx1 and did not know where she was or what month or year this was. She was unable to describe why and how she came to the hospital. Collateral information was obtained from Pt's psychiatrist and nurse and manager at the group home. They stated that her confusion was uncharacteristic and at baseline, pt is interactive, animated, and pleasant. Pt has a history of medication-seeking behaviors for benzodiazepines, benadryl, and tylenol. In the past, she has stated that she was in pain just to obtain medications, but at other times, has been in true pain from endometriosis. As per psychiatrist: Pt has a past history of stressor-induced psychogenic nonepileptic seizures and bipolar disorder and  intellectual disability.   Please note that Thorazine can lower seizure threshold, will continue the medications  to prevent psychiatric decompensation, as patient has been stable on the medication    PLAN  --Continue Patient's current psychiatric regimen as written: Chlorpromazine 50mg QID , Seroquel 50mg once a day and Seroquel 200mg bid  (8AM and 8PM), trileptal 150mg bid and  Ativan 0.5mg BID PRN for anxiety  -PRN Haldol 2.5mg q4h PO/IM/IV for agitation with an additional 2.5mg if first dose is not effective.    -HOLD antipsychotic if qtc >500  -Continue CO 1:1 for h/o aggression  -No Loose or sharp objects and no metal utensils.   -Will follow up tomorrow  -Dispo: does not need an inpatient psychiatric admission at this time, should be discharged back to  once medically optimized 36 y/o F with a PMHx of Factor V Leiden deficiency a/w DVT/PE in 2016 on Eliquis, history of seizure disorder in childhood, h/o psychogenic nonepileptic seizures  as per chart h/o bipolar disorder, intellectual disability,  personality d/o, mood d/o, multiple inpatient psychiatric admissions most recent [place] 7/20-8/21, hx crack/cocaine use dos, prior legal issues (weapons possession, harassment charges), two prior SA (overdose 2017),  asthma, diabetes, HTN, GERD, endometriosis, and hypothyroidism presents to the ED for ear pain. Consulted fro medication management    On MSE, Pt was confused, lethargic but arousable to verbal stimuli, and AAOx1 and did not know where she was or what month or year this was. She was unable to describe why and how she came to the hospital. Collateral information was obtained from Pt's psychiatrist and nurse and manager at the group home. They stated that her confusion was uncharacteristic and at baseline, pt is interactive, animated, and pleasant. Pt has a history of medication-seeking behaviors for benzodiazepines, benadryl, and tylenol. In the past, she has stated that she was in pain just to obtain medications, but at other times, has been in true pain from endometriosis. As per psychiatrist: Pt has a past history of stressor-induced psychogenic nonepileptic seizures and bipolar disorder and  intellectual disability.   Please note that Thorazine can lower seizure threshold, will continue the medications  to prevent psychiatric decompensation, as patient has been stable on the medication    PLAN  --Continue Patient's current psychiatric regimen as written: Chlorpromazine 50mg QID , Seroquel 50mg once a day and Seroquel 200mg bid  (8AM and 8PM), trileptal 150mg bid and  Ativan 0.5mg BID PRN for anxiety  -PRN Haldol 2.5mg q4h PO/IM/IV for agitation with an additional 2.5mg if first dose is not effective.    -HOLD antipsychotic if qtc >500  -Continue CO 1:1 for h/o aggression  -No Loose or sharp objects and no metal utensils.   -Will follow up tomorrow  -Dispo: does not need an inpatient psychiatric admission at this time, should be discharged back to  once medically optimized and if remains psychiatrically stable    If any change in patient's behavior please call [facility] Psychiatry at #[phone]

## 2022-09-20 NOTE — BH CONSULTATION LIAISON ASSESSMENT NOTE - RISK ASSESSMENT
Risk factors: previous SA, psychiatric diagnosis   Protective factors: friends, support system  Risk factors: previous SA, psychiatric diagnosis , h/o aggression, impulsivity  Protective factors: friends, support system , stable housing, has outpatient providers. Risk factors: previous SA, psychiatric diagnosis , h/o aggression, impulsivity  Protective factors: friends, support system , stable housing, has outpatient providers.    Not at acute risk of harm to self or others at this time.

## 2022-09-20 NOTE — H&P ADULT - PROBLEM/PLAN-1
DISPLAY PLAN FREE TEXT Muscle Hinge Flap Text: The defect edges were debeveled with a #15 scalpel blade.  Given the size, depth and location of the defect and the proximity to free margins a muscle hinge flap was deemed most appropriate.  Using a sterile surgical marker, an appropriate hinge flap was drawn incorporating the defect. The area thus outlined was incised with a #15 scalpel blade.  The skin margins were undermined to an appropriate distance in all directions utilizing iris scissors.

## 2022-09-20 NOTE — BH CONSULTATION LIAISON ASSESSMENT NOTE - NSBHATTESTCOMMENTATTENDFT_PSY_A_CORE
Chart reviewed, pt. seen/evaluated with student, I agree with above assessment/plan. Patient calm/cooperative, AAOX1, somewhat drowsy and slow,  unable to tell where she is, unable to tell why she is in the hospital, stating " I am very confused". Patient denies AVH, denies feeling unsafe, firmly denies SI and HI.  Care coordinated with patient's outpatient psychiatrist, nurse and Manager at the . See above for detail. Plan as above, will follow

## 2022-09-20 NOTE — H&P ADULT - PROBLEM SELECTOR PLAN 9
- DVT ppx: patient already anticoagulated with eliquis   - Case and plan to be discussed with Dr. Ruelas - DVT ppx: patient already anticoagulated with eliquis   - Case and plan discussed with Dr. Ruelas

## 2022-09-20 NOTE — ED PROVIDER NOTE - CLINICAL SUMMARY MEDICAL DECISION MAKING FREE TEXT BOX
38 yo F with hx of Factor V leiden deficiency a/w dvt/pe, on Eliquis, ?seizures vs pseudoseizures on Trileptal presenting with body aches and ear pain. +urinary symptoms. Suspect viral syndrome +/- UTI. Plan for RVP, urine, Tylenol and reassess.

## 2022-09-21 ENCOUNTER — TRANSCRIPTION ENCOUNTER (OUTPATIENT)
Age: 37
End: 2022-09-21

## 2022-09-21 DIAGNOSIS — Z02.9 ENCOUNTER FOR ADMINISTRATIVE EXAMINATIONS, UNSPECIFIED: ICD-10-CM

## 2022-09-21 LAB
A1C WITH ESTIMATED AVERAGE GLUCOSE RESULT: 5.2 % — SIGNIFICANT CHANGE UP (ref 4–5.6)
ALBUMIN SERPL ELPH-MCNC: 4.6 G/DL — SIGNIFICANT CHANGE UP (ref 3.3–5)
ALP SERPL-CCNC: 40 U/L — SIGNIFICANT CHANGE UP (ref 40–120)
ALT FLD-CCNC: 8 U/L — SIGNIFICANT CHANGE UP (ref 4–33)
ANION GAP SERPL CALC-SCNC: 14 MMOL/L — SIGNIFICANT CHANGE UP (ref 7–14)
AST SERPL-CCNC: 87 U/L — HIGH (ref 4–32)
BILIRUB SERPL-MCNC: 0.3 MG/DL — SIGNIFICANT CHANGE UP (ref 0.2–1.2)
BUN SERPL-MCNC: 10 MG/DL — SIGNIFICANT CHANGE UP (ref 7–23)
CALCIUM SERPL-MCNC: 9.3 MG/DL — SIGNIFICANT CHANGE UP (ref 8.4–10.5)
CHLORIDE SERPL-SCNC: 99 MMOL/L — SIGNIFICANT CHANGE UP (ref 98–107)
CO2 SERPL-SCNC: 20 MMOL/L — LOW (ref 22–31)
CREAT SERPL-MCNC: 0.74 MG/DL — SIGNIFICANT CHANGE UP (ref 0.5–1.3)
CULTURE RESULTS: SIGNIFICANT CHANGE UP
EGFR: 108 ML/MIN/1.73M2 — SIGNIFICANT CHANGE UP
ESTIMATED AVERAGE GLUCOSE: 103 — SIGNIFICANT CHANGE UP
GLUCOSE BLDC GLUCOMTR-MCNC: 105 MG/DL — HIGH (ref 70–99)
GLUCOSE BLDC GLUCOMTR-MCNC: 83 MG/DL — SIGNIFICANT CHANGE UP (ref 70–99)
GLUCOSE BLDC GLUCOMTR-MCNC: 90 MG/DL — SIGNIFICANT CHANGE UP (ref 70–99)
GLUCOSE BLDC GLUCOMTR-MCNC: 97 MG/DL — SIGNIFICANT CHANGE UP (ref 70–99)
GLUCOSE SERPL-MCNC: 89 MG/DL — SIGNIFICANT CHANGE UP (ref 70–99)
HCT VFR BLD CALC: 35.4 % — SIGNIFICANT CHANGE UP (ref 34.5–45)
HGB BLD-MCNC: 11.4 G/DL — LOW (ref 11.5–15.5)
MCHC RBC-ENTMCNC: 29.6 PG — SIGNIFICANT CHANGE UP (ref 27–34)
MCHC RBC-ENTMCNC: 32.2 GM/DL — SIGNIFICANT CHANGE UP (ref 32–36)
MCV RBC AUTO: 91.9 FL — SIGNIFICANT CHANGE UP (ref 80–100)
NRBC # BLD: 0 /100 WBCS — SIGNIFICANT CHANGE UP (ref 0–0)
NRBC # FLD: 0 K/UL — SIGNIFICANT CHANGE UP (ref 0–0)
PLATELET # BLD AUTO: 185 K/UL — SIGNIFICANT CHANGE UP (ref 150–400)
POTASSIUM SERPL-MCNC: SIGNIFICANT CHANGE UP MMOL/L (ref 3.5–5.3)
POTASSIUM SERPL-SCNC: SIGNIFICANT CHANGE UP MMOL/L (ref 3.5–5.3)
PROT SERPL-MCNC: SIGNIFICANT CHANGE UP G/DL (ref 6–8.3)
RBC # BLD: 3.85 M/UL — SIGNIFICANT CHANGE UP (ref 3.8–5.2)
RBC # FLD: 14 % — SIGNIFICANT CHANGE UP (ref 10.3–14.5)
SODIUM SERPL-SCNC: 133 MMOL/L — LOW (ref 135–145)
SPECIMEN SOURCE: SIGNIFICANT CHANGE UP
WBC # BLD: 4.36 K/UL — SIGNIFICANT CHANGE UP (ref 3.8–10.5)
WBC # FLD AUTO: 4.36 K/UL — SIGNIFICANT CHANGE UP (ref 3.8–10.5)

## 2022-09-21 PROCEDURE — 73560 X-RAY EXAM OF KNEE 1 OR 2: CPT | Mod: 26,RT

## 2022-09-21 PROCEDURE — 99233 SBSQ HOSP IP/OBS HIGH 50: CPT

## 2022-09-21 PROCEDURE — 99232 SBSQ HOSP IP/OBS MODERATE 35: CPT

## 2022-09-21 PROCEDURE — 12345: CPT | Mod: NC

## 2022-09-21 RX ORDER — HALOPERIDOL DECANOATE 100 MG/ML
5 INJECTION INTRAMUSCULAR EVERY 6 HOURS
Refills: 0 | Status: DISCONTINUED | OUTPATIENT
Start: 2022-09-21 | End: 2022-09-23

## 2022-09-21 RX ORDER — ACETAMINOPHEN 500 MG
1000 TABLET ORAL ONCE
Refills: 0 | Status: DISCONTINUED | OUTPATIENT
Start: 2022-09-21 | End: 2022-09-22

## 2022-09-21 RX ORDER — OXYCODONE AND ACETAMINOPHEN 5; 325 MG/1; MG/1
1 TABLET ORAL ONCE
Refills: 0 | Status: DISCONTINUED | OUTPATIENT
Start: 2022-09-21 | End: 2022-09-21

## 2022-09-21 RX ORDER — DIPHENHYDRAMINE HCL 50 MG
25 CAPSULE ORAL ONCE
Refills: 0 | Status: COMPLETED | OUTPATIENT
Start: 2022-09-21 | End: 2022-09-21

## 2022-09-21 RX ORDER — HALOPERIDOL DECANOATE 100 MG/ML
2.5 INJECTION INTRAMUSCULAR EVERY 4 HOURS
Refills: 0 | Status: DISCONTINUED | OUTPATIENT
Start: 2022-09-21 | End: 2022-09-21

## 2022-09-21 RX ORDER — HALOPERIDOL DECANOATE 100 MG/ML
2.5 INJECTION INTRAMUSCULAR ONCE
Refills: 0 | Status: COMPLETED | OUTPATIENT
Start: 2022-09-21 | End: 2022-09-21

## 2022-09-21 RX ORDER — CYCLOBENZAPRINE HYDROCHLORIDE 10 MG/1
10 TABLET, FILM COATED ORAL THREE TIMES A DAY
Refills: 0 | Status: DISCONTINUED | OUTPATIENT
Start: 2022-09-21 | End: 2022-09-22

## 2022-09-21 RX ADMIN — APIXABAN 2.5 MILLIGRAM(S): 2.5 TABLET, FILM COATED ORAL at 05:27

## 2022-09-21 RX ADMIN — Medication 1 TABLET(S): at 12:24

## 2022-09-21 RX ADMIN — Medication 1 SPRAY(S): at 05:27

## 2022-09-21 RX ADMIN — Medication 50 MILLIGRAM(S): at 06:35

## 2022-09-21 RX ADMIN — Medication 50 MILLIGRAM(S): at 10:29

## 2022-09-21 RX ADMIN — Medication 0.5 MILLIGRAM(S): at 16:19

## 2022-09-21 RX ADMIN — Medication 650 MILLIGRAM(S): at 13:33

## 2022-09-21 RX ADMIN — Medication 650 MILLIGRAM(S): at 14:30

## 2022-09-21 RX ADMIN — QUETIAPINE FUMARATE 50 MILLIGRAM(S): 200 TABLET, FILM COATED ORAL at 12:24

## 2022-09-21 RX ADMIN — OXYCODONE AND ACETAMINOPHEN 1 TABLET(S): 5; 325 TABLET ORAL at 01:40

## 2022-09-21 RX ADMIN — Medication 25 MILLIGRAM(S): at 23:10

## 2022-09-21 RX ADMIN — OXCARBAZEPINE 150 MILLIGRAM(S): 300 TABLET, FILM COATED ORAL at 23:00

## 2022-09-21 RX ADMIN — OXYCODONE AND ACETAMINOPHEN 1 TABLET(S): 5; 325 TABLET ORAL at 06:35

## 2022-09-21 RX ADMIN — Medication 50 MILLIGRAM(S): at 17:17

## 2022-09-21 RX ADMIN — HALOPERIDOL DECANOATE 2.5 MILLIGRAM(S): 100 INJECTION INTRAMUSCULAR at 14:48

## 2022-09-21 RX ADMIN — Medication 3 MILLIGRAM(S): at 23:00

## 2022-09-21 RX ADMIN — Medication 50 MILLIGRAM(S): at 23:00

## 2022-09-21 RX ADMIN — HALOPERIDOL DECANOATE 2.5 MILLIGRAM(S): 100 INJECTION INTRAMUSCULAR at 15:00

## 2022-09-21 RX ADMIN — QUETIAPINE FUMARATE 200 MILLIGRAM(S): 200 TABLET, FILM COATED ORAL at 09:30

## 2022-09-21 RX ADMIN — OXYCODONE AND ACETAMINOPHEN 1 TABLET(S): 5; 325 TABLET ORAL at 00:41

## 2022-09-21 RX ADMIN — Medication 25 MILLIGRAM(S): at 10:12

## 2022-09-21 RX ADMIN — QUETIAPINE FUMARATE 200 MILLIGRAM(S): 200 TABLET, FILM COATED ORAL at 19:43

## 2022-09-21 RX ADMIN — PANTOPRAZOLE SODIUM 40 MILLIGRAM(S): 20 TABLET, DELAYED RELEASE ORAL at 05:27

## 2022-09-21 RX ADMIN — OXCARBAZEPINE 150 MILLIGRAM(S): 300 TABLET, FILM COATED ORAL at 05:28

## 2022-09-21 RX ADMIN — Medication 75 MICROGRAM(S): at 05:28

## 2022-09-21 NOTE — PROGRESS NOTE ADULT - SUBJECTIVE AND OBJECTIVE BOX
Patient is a 37y old  Female who presents with a chief complaint of Seizure like activity in the ED (21 Sep 2022 12:16)      SUBJECTIVE / OVERNIGHT EVENTS:  resting in bed, 1:1 at bedside  eating regular food. Notes abd discomfort  will give tylenol and return  her to facility  Neuro saw patit - s/o - no siezure  psych s/o can return to   No fever/ no n/v/ no leucocytosis. NL abd exam    MEDICATIONS  (STANDING):  apixaban 2.5 milliGRAM(s) Oral every 12 hours  chlorproMAZINE    Tablet 50 milliGRAM(s) Oral <User Schedule>  dextrose 5%. 1000 milliLiter(s) (50 mL/Hr) IV Continuous <Continuous>  dextrose 5%. 1000 milliLiter(s) (100 mL/Hr) IV Continuous <Continuous>  dextrose 50% Injectable 25 Gram(s) IV Push once  dextrose 50% Injectable 12.5 Gram(s) IV Push once  dextrose 50% Injectable 25 Gram(s) IV Push once  fluticasone propionate 50 MICROgram(s)/spray Nasal Spray 1 Spray(s) Both Nostrils two times a day  influenza   Vaccine 0.5 milliLiter(s) IntraMuscular once  lactobacillus acidophilus 1 Tablet(s) Oral daily  levothyroxine 75 MICROGram(s) Oral daily  melatonin 3 milliGRAM(s) Oral at bedtime  multivitamin 1 Tablet(s) Oral daily  OXcarbazepine 150 milliGRAM(s) Oral two times a day  pantoprazole    Tablet 40 milliGRAM(s) Oral before breakfast  QUEtiapine 200 milliGRAM(s) Oral <User Schedule>  QUEtiapine 50 milliGRAM(s) Oral <User Schedule>  trimethoprim  160 mG/sulfamethoxazole 800 mG 1 Tablet(s) Oral daily    MEDICATIONS  (PRN):  acetaminophen     Tablet .. 650 milliGRAM(s) Oral every 6 hours PRN Severe Pain (7 - 10)  dextrose Oral Gel 15 Gram(s) Oral once PRN Blood Glucose LESS THAN 70 milliGRAM(s)/deciliter  dextrose Oral Gel 15 Gram(s) Oral once PRN Blood Glucose LESS THAN 70 milliGRAM(s)/deciliter  haloperidol    Injectable 2.5 milliGRAM(s) IV Push once PRN aggression  LORazepam     Tablet 0.5 milliGRAM(s) Oral two times a day PRN Anxiety  polyethylene glycol 3350 17 Gram(s) Oral daily PRN Constipation        CAPILLARY BLOOD GLUCOSE  POCT Blood Glucose.: 97 mg/dL (21 Sep 2022 12:18)  POCT Blood Glucose.: 83 mg/dL (21 Sep 2022 06:35)  POCT Blood Glucose.: 110 mg/dL (20 Sep 2022 21:37)  POCT Blood Glucose.: 106 mg/dL (20 Sep 2022 19:51)  POCT Blood Glucose.: 82 mg/dL (20 Sep 2022 19:22)  POCT Blood Glucose.: 56 mg/dL (20 Sep 2022 18:19)  POCT Blood Glucose.: 96 mg/dL (20 Sep 2022 12:51)    I&O's Summary    20 Sep 2022 07:01  -  21 Sep 2022 07:00  --------------------------------------------------------  IN: 300 mL / OUT: 200 mL / NET: 100 mL        PHYSICAL EXAM:  GENERAL: NAD, well-developed  HEAD:  Atraumatic, Normocephalic  EYES: EOMI, PERRLA, conjunctiva and sclera clear  NECK: Supple, No JVD  CHEST/LUNG: Clear to auscultation bilaterally; No wheeze  HEART: Regular rate and rhythm; No murmurs, rubs, or gallops  ABDOMEN: Soft, Nontender, Nondistended; Bowel sounds present  EXTREMITIES:  2+ Peripheral Pulses, No clubbing, cyanosis, or edema  PSYCH: lert  NEUROLOGY: non-focal  SKIN: No rashes or lesions    LABS:                        11.4   4.36  )-----------( 185      ( 21 Sep 2022 10:07 )             35.4     -    133<L>  |  99  |  10  ----------------------------<  89  TNP   |  20<L>  |  0.74    Ca    9.3      21 Sep 2022 10:07  Phos  4.3     09-  Mg     1.90     -    TPro  TNP  /  Alb  4.6  /  TBili  0.3  /  DBili  x   /  AST  87<H>  /  ALT  8   /  AlkPhos  40            Urinalysis Basic - ( 20 Sep 2022 00:02 )    Color: Yellow / Appearance: Slightly Turbid / S.019 / pH: x  Gluc: x / Ketone: Negative  / Bili: Negative / Urobili: <2 mg/dL   Blood: x / Protein: Trace / Nitrite: Negative   Leuk Esterase: Negative / RBC: 1 /HPF / WBC 5 /HPF   Sq Epi: x / Non Sq Epi: 16 /HPF / Bacteria: Few        RADIOLOGY & ADDITIONAL TESTS:    Imaging Personally Reviewed:    Consultant(s) Notes Reviewed:      Care Discussed with Consultants/Other Providers:

## 2022-09-21 NOTE — CHART NOTE - NSCHARTNOTEFT_GEN_A_CORE
3:20 pm  Code flight called  Patient stated to RN she is going to leave and kill herself---> her pain is not being controlled    Patient seen surrounded by security and transporter ready to take her to group home  patient noted she has pain to L knee and RLQ pain not helped with Tylenol  patient notes she usually gets muscle relaxant for her pain.  Notes RLQ pain 6/10. no n/v- had good appetite with lunch       Pe nad  Vital Signs Last 24 Hrs  T(C): 36.6 (21 Sep 2022 11:22), Max: 36.6 (21 Sep 2022 05:13)  T(F): 97.9 (21 Sep 2022 11:22), Max: 97.9 (21 Sep 2022 11:22)  HR: 87 (21 Sep 2022 11:22) (87 - 110)  BP: 106/71 (21 Sep 2022 11:22) (104/71 - 119/86)  BP(mean): --  RR: 19 (21 Sep 2022 11:22) (19 - 20)  SpO2: 99% RA  Lungs CTA, cor rrr, abd soft  OBESE N/T to palpation.  Ext neg e/c/c  mild induration to R knee compared to L knee  no pain with elevation of legs    A/P  # Suicidal ideation  psych called to f/u- seen by psych earlier  today.    # R knee pain for over 1 year  c/w Tylenol prn  Start Flexeril 10 mg q8 prn- hold for sedation    # RLQ pain  Ct of A/P to asses   IV tylenol x1    Plan d/w SW/ RN/ PA/ patient    Internal Medicine  Samantha Christina   #[id number] 3:20 pm  Code flight called  Patient stated to RN she is going to leave and kill herself---> her pain is not being controlled    Patient seen surrounded by security and transporter ready to take her to group home  patient noted she has pain to L knee and RLQ pain not helped with Tylenol  patient notes she usually gets muscle relaxant for her pain.  Notes RLQ pain 6/10. no n/v- had good appetite with lunch       Pe nad  Vital Signs Last 24 Hrs  T(C): 36.6 (21 Sep 2022 11:22), Max: 36.6 (21 Sep 2022 05:13)  T(F): 97.9 (21 Sep 2022 11:22), Max: 97.9 (21 Sep 2022 11:22)  HR: 87 (21 Sep 2022 11:22) (87 - 110)  BP: 106/71 (21 Sep 2022 11:22) (104/71 - 119/86)  BP(mean): --  RR: 19 (21 Sep 2022 11:22) (19 - 20)  SpO2: 99% RA  Lungs CTA, cor rrr, abd soft  OBESE N/T to palpation.  Ext neg e/c/c  mild induration to R knee compared to L knee  no pain with elevation of legs    A/P  # Suicidal ideation, c/w 1:1  psych called to f/u- seen by psych earlier  today.  c/w psychmedication and prn haldol/ativan    # R knee pain for over 1 year  c/w Tylenol prn  Start Flexeril 10 mg q8 prn- hold for sedation    # RLQ pain  Ct of A/P to asses   IV tylenol x1    Plan d/w SW/ RN/ PA/ patient    Internal Medicine  Samantha Christina   #[id number]

## 2022-09-21 NOTE — DISCHARGE NOTE PROVIDER - HOSPITAL COURSE
36 y/o F with a PMHx of Factor V Leiden deficiency a/w DVT/PE on Eliquis, history of seizure disorder in childhood, electrographically confirmed psychogenic nonepileptic seizures (during [facility] EMU admission 7/8-7/11/22), bipolar disorder, insomnia, asthma, diabetes, HTN, GERD, endometriosis, and hypothyroidism presents to the ED for ear pain.      Pt was originally brought to the ED from psychiatric group home on 9/19/22 presenting with right ear pain and body aches. Pt was about to be discharged however upon re-assessment became unresponsive without shaking or jerking of the extremities. Due to concern for seizure, ativan 2mg IM x2 was administered over approximately 15 minutes, without improvement in mental status.   Later, patient then had 1 minute episode of tonic clonic movements with hypoxia in the 70s. Neurology was then consulted. Patient later seen ambulating and was agitated with concern for elopement so placed on 1:1 observation and given haldol 5mg IV in ED. On my arrival, patient sleeping but easily arousable to verbal stimuli. Woke up and was able to answer simple yes or no questions. She was AAO x 2 (self and place, but could not remember year/month). Patient only complaints of mild pain located in the RLQ/pelvic area and denies any other associated sx that she says started when she got to ED. She couldn't elaborate further on quality of pain. Currently, pt states that she has no other acute complaints or injuries.      While in the ED, she refused to get CT head. She was also complaining of itching, which is chronic, and was given benadryl IV and PO. She also refused her Trileptal.   Her vital signs were otherwise stable.     Some collateral information regarding medications obtained from patients residential home RN 38 y/o F with a PMHx of Factor V Leiden deficiency a/w DVT/PE on Eliquis, history of seizure disorder in childhood, electrographically confirmed psychogenic nonepileptic seizures (during [facility] EMU admission 7/8-7/11/22), bipolar disorder, insomnia, asthma, diabetes, HTN, GERD, endometriosis, and hypothyroidism presents to the ED for ear pain.      Pt was originally brought to the ED from psychiatric group home on 9/19/22 presenting with right ear pain and body aches. Pt was about to be discharged however upon re-assessment became unresponsive without shaking or jerking of the extremities. Due to concern for seizure, ativan 2mg IM x2 was administered over approximately 15 minutes, without improvement in mental status.   Later, patient then had 1 minute episode of tonic clonic movements with hypoxia in the 70s. Neurology was then consulted. Patient later seen ambulating and was agitated with concern for elopement so placed on 1:1 observation and given haldol 5mg IV in ED. On my arrival, patient sleeping but easily arousable to verbal stimuli. Woke up and was able to answer simple yes or no questions. She was AAO x 2 (self and place, but could not remember year/month). Patient only complaints of mild pain located in the RLQ/pelvic area and denies any other associated sx that she says started when she got to ED. She couldn't elaborate further on quality of pain. Currently, pt states that she has no other acute complaints or injuries.    While in the ED, she refused to get CT head. She was also complaining of itching, which is chronic, and was given benadryl IV and PO. She also refused her Trileptal.   Her vital signs were otherwise stable.     HOSPITAL COURSE:  38 y/o F with a PMHx of Factor V Leiden deficiency a/w DVT on Eliquis, history of seizure disorder in childhood, electrographically confirmed psychogenic nonepileptic seizures (during [facility] EMU admission 7/8-7/11/22), bipolar disorder, insomnia, asthma, diabetes, HTN, endometriosis, and hypothyroidism presents to the ED initially for body aches, right ear discomfort - now resolved with plan for discharge from ED, then with unresponsiveness followed by seizure activity while in the ED.      Problem/Plan - 1:  ·  Problem: Seizure-like activity.   ·  Plan: - Pt has been previously diagnosed with electrographically confirmed psychogenic nonepileptic seizures during admission at [facility] EMU (7/8-7/11/22)    - Neurology evaluation appreciated and case discussed  - as per neurology, these are likely pseudoseizures and no further work up is required from neurological stand point at this time  - Neuro exam grossly non focal   - no further hypoxia noted after seizure episode - unclear if true hypoxia at that time vs poor signal on pulse ox?  - neuro checks; fall, seizure, and aspiration precautions   - monitor on telemetry   - Per neuro; hold further administration of benzodiazepines at this time  - If patient noted to have a generalized tonic clonic seizure lasting > 3 minutes or with severe derangement of VS, [place] [phone] per neuro  - continue home trileptal 150mg PO BID  - patient refused head CT in ED, she now appears more cooperative, so will re-order head CT for further evaluation however low suspicion of acute pathology as this is a known problem in past and neuro exam non focal.     Problem/Plan - 2:  ·  Problem: Pelvic pain.   ·  Plan: - Patient reports mild RLQ abdominal/R pelvic pain since being in ED  - denies problems urinating or moving bowels  - UA appears to be negative.  - abdominal exam is benign and patient denies tenderness upon palpation   - will give Tylenol PRN and re-assess   - 9/21 no tenderness on exam  Can c/w tylenol and return to facility.       Problem/Plan - 3:  ·  Problem: Factor V Leiden.   ·  Plan: - Patient with hx DVT/PE  - previous dose of eliquis was 5mg PO BID, including on recent discharge from [facility] on 9/12/22  - for unclear reasons, patient placed on eliquis 2.5mg PO BID at group home  - per her group home RN, no known bleeding events recently  - continue at home dose of 2.5mg PO BID - will need further collateral and discussion on whether to increase her dose back to 5mg PO BID.     Problem/Plan - 4:  ·  Problem: Bipolar disorder.   ·  Plan: - Patient's Latuda and Abilify recently stopped per my discussion with patients RN Fidel, and 25mg dose of Thorazine also DC'd  - she also has schizoaffective disorder   - continue home dose 50mg Thorazine QID (7a, 11a, 4pm, 8pm) with hold parameters   - continue home dose Seroquel 200mg 8am, 8pm, and 50mg 12 noon   - takes ativan 0.5mg PO BID as needed at group home, hold for the moment as patient is sleepy after haldol   - psychiatry evaluation called given ongoing likely PNES and recent medication changes.     Problem/Plan - 5:  ·  Problem: DM (diabetes mellitus).   ·  Plan: - Patient on no meds  - patient actually hypoglycemic in ED to 59, was not symptomatic per RN   - given oral glucose with improvement 64; will give amp of D50x 1; will check repeatedly until above 100  - hold sliding scale for now   - continue to monitor FS closely   - diet started   - check HgA1c.     Problem/Plan - 6:  ·  Problem: HTN (hypertension).   ·  Plan: - BP stable - on no BP meds  - continue to monitor.     Problem/Plan - 7:  ·  Problem: GERD (gastroesophageal reflux disease).   ·  Plan: - continue PPI daily.       Problem/Plan - 8  ·  Problem: Discharge planning issues.   ·  Plan; Chart and Labs reviewed.  Patient is optimized to return to   No seizure- Neuro s/o  no psych in patient needed  Return to .   38 y/o F with a PMHx of Factor V Leiden deficiency a/w DVT/PE on Eliquis, history of seizure disorder in childhood, electrographically confirmed psychogenic nonepileptic seizures (during [facility] EMU admission 7/8-7/11/22), bipolar disorder, insomnia, asthma, diabetes, HTN, GERD, endometriosis, and hypothyroidism presents to the ED for ear pain.      Pt was originally brought to the ED from psychiatric group home on 9/19/22 presenting with right ear pain and body aches. Pt was about to be discharged however upon re-assessment became unresponsive without shaking or jerking of the extremities. Due to concern for seizure, ativan 2mg IM x2 was administered over approximately 15 minutes, without improvement in mental status.   Later, patient then had 1 minute episode of tonic clonic movements with hypoxia in the 70s. Neurology was then consulted. Patient later seen ambulating and was agitated with concern for elopement so placed on 1:1 observation and given haldol 5mg IV in ED. On my arrival, patient sleeping but easily arousable to verbal stimuli. Woke up and was able to answer simple yes or no questions. She was AAO x 2 (self and place, but could not remember year/month). Patient only complaints of mild pain located in the RLQ/pelvic area and denies any other associated sx that she says started when she got to ED. She couldn't elaborate further on quality of pain. Currently, pt states that she has no other acute complaints or injuries.    While in the ED, she refused to get CT head. She was also complaining of itching, which is chronic, and was given benadryl IV and PO. She also refused her Trileptal.   Her vital signs were otherwise stable.     HOSPITAL COURSE:  38 y/o F with a PMHx of Factor V Leiden deficiency a/w DVT on Eliquis, history of seizure disorder in childhood, electrographically confirmed psychogenic nonepileptic seizures (during [facility] EMU admission 7/8-7/11/22), bipolar disorder, insomnia, asthma, diabetes, HTN, endometriosis, and hypothyroidism presents to the ED initially for body aches, right ear discomfort - now resolved with plan for discharge from ED, then with unresponsiveness followed by seizure activity while in the ED.   knee xray- nl  ct a/p - no acute changes that can cause pain     Problem/Plan - 1:  ·  Problem: Seizure-like activity.   ·  Plan: - Pt has been previously diagnosed with electrographically confirmed psychogenic nonepileptic seizures during admission at [facility] EMU (7/8-7/11/22)    - Neurology evaluation appreciated and case discussed  - as per neurology, these are likely pseudoseizures and no further work up is required from neurological stand point at this time  - Neuro exam grossly non focal   - no further hypoxia noted after seizure episode - unclear if true hypoxia at that time vs poor signal on pulse ox?  - neuro checks; fall, seizure, and aspiration precautions   - monitor on telemetry   - Per neuro; hold further administration of benzodiazepines at this time  - If patient noted to have a generalized tonic clonic seizure lasting > 3 minutes or with severe derangement of VS, [place] [phone] per neuro  - continue home trileptal 150mg PO BID  - patient refused head CT in ED, she now appears more cooperative, so will re-order head CT for further evaluation however low suspicion of acute pathology as this is a known problem in past and neuro exam non focal.     Problem/Plan - 2:  ·  Problem: Pelvic pain.   ·  Plan: - Patient reports mild RLQ abdominal/R pelvic pain since being in ED  - denies problems urinating or moving bowels  - UA appears to be negative.  - abdominal exam is benign and patient denies tenderness upon palpation   - will give Tylenol PRN and re-assess   - 9/21 no tenderness on exam  Can c/w tylenol and return to facility.       Problem/Plan - 3:  ·  Problem: Factor V Leiden.   ·  Plan: - Patient with hx DVT/PE  - previous dose of eliquis was 5mg PO BID, including on recent discharge from [facility] on 9/12/22  - for unclear reasons, patient placed on eliquis 2.5mg PO BID at group home  - per her group home RN, no known bleeding events recently  - continue at home dose of 2.5mg PO BID - will need further collateral and discussion on whether to increase her dose back to 5mg PO BID.     Problem/Plan - 4:  ·  Problem: Bipolar disorder.   ·  Plan: - Patient's Latuda and Abilify recently stopped per my discussion with patients ALEYDA Parra, and 25mg dose of Thorazine also DC'd  - she also has schizoaffective disorder   - continue home dose 50mg Thorazine QID (7a, 11a, 4pm, 8pm) with hold parameters   - continue home dose Seroquel 200mg 8am, 8pm, and 50mg 12 noon   - takes ativan 0.5mg PO BID as needed at group home, hold for the moment as patient is sleepy after haldol   - psychiatry evaluation called given ongoing likely PNES and recent medication changes.     Problem/Plan - 5:  ·  Problem: DM (diabetes mellitus).   ·  Plan: - Patient on no meds  - patient actually hypoglycemic in ED to 59, was not symptomatic per RN   - given oral glucose with improvement 64; will give amp of D50x 1; will check repeatedly until above 100  - hold sliding scale for now   - continue to monitor FS closely   - diet started   - check HgA1c.     Problem/Plan - 6:  ·  Problem: HTN (hypertension).   ·  Plan: - BP stable - on no BP meds  - continue to monitor.     Problem/Plan - 7:  ·  Problem: GERD (gastroesophageal reflux disease).   ·  Plan: - continue PPI daily.       Problem/Plan - 8  ·  Problem: Discharge planning issues.   ·  Plan; Chart and Labs reviewed.  Patient is optimized to return to   No seizure- Neuro s/o  no psych in patient needed  Return to .     HOSPITAL COURSE:  38 y/o F with a PMHx of Factor V Leiden deficiency a/w DVT on Eliquis, history of seizure disorder in childhood, electrographically confirmed psychogenic nonepileptic seizures (during [facility] EMU admission 7/8-7/11/22), bipolar disorder, insomnia, asthma, diabetes, HTN, endometriosis, and hypothyroidism presents to the ED initially for body aches, right ear discomfort - now resolved with plan for discharge from ED, then with unresponsiveness followed by seizure activity while in the ED.   knee xray- nl    Seizure-like activity.   - Pt has been previously diagnosed with electrographically confirmed psychogenic nonepileptic seizures during admission at [facility] EMU (7/8-7/11/22)    - Neurology evaluation appreciated and case discussed  - as per neurology, these are likely pseudoseizures and no further work up is required from neurological stand point at this time  - Neuro exam grossly non focal   - no further hypoxia noted after seizure episode - unclear if true hypoxia at that time vs poor signal on pulse ox?  - neuro checks; fall, seizure, and aspiration precautions   - monitor on telemetry   - Per neuro; hold further administration of benzodiazepines at this time  - If patient noted to have a generalized tonic clonic seizure lasting > 3 minutes or with severe derangement of VS, [place] [phone] per neuro  - continue home trileptal 150mg PO BID  - patient refused head CT in ED, she now appears more cooperative, so will re-order head CT for further evaluation however low suspicion of acute pathology as this is a known problem in past and neuro exam non focal.    Pelvic pain.   - Patient reports mild RLQ abdominal/R pelvic pain since being in ED  - denies problems urinating or moving bowels  - UA appears to be negative.  - abdominal exam is benign and patient denies tenderness upon palpation   - will give Tylenol PRN and re-assess   - 9/21 no tenderness on exam  -Can c/w tylenol and return to facility.      Factor V Leiden.   - Patient with hx DVT/PE  - previous dose of eliquis was 5mg PO BID, including on recent discharge from [facility] on 9/12/22  - for unclear reasons, patient placed on eliquis 2.5mg PO BID at group home  - per her group home RN, no known bleeding events recently  - continue at home dose of 2.5mg PO BID - will need further collateral and discussion on whether to increase her dose back to 5mg PO BID.    Problem: Bipolar disorder.    Patient's Latuda and Abilify recently stopped per my discussion with patients RN Fidel, and 25mg dose of Thorazine also DC'd  - she also has schizoaffective disorder   - continue home dose 50mg Thorazine QID (7a, 11a, 4pm, 8pm) with hold parameters   - continue home dose Seroquel 200mg 8am, 8pm, and 50mg 12 noon   - takes ativan 0.5mg PO BID as needed at group home, hold for the moment as patient is sleepy after haldol   - psychiatry evaluation called given ongoing likely PNES and recent medication changes.    DM (diabetes mellitus).   - Patient on no meds  - patient actually hypoglycemic in ED to 59, was not symptomatic per RN   - given oral glucose with improvement 64; will give amp of D50x 1; will check repeatedly until above 100  - HgA1c. 5.2     HTN (hypertension).   - BP stable - on no BP meds  - continue to monitor.    GERD (gastroesophageal reflux disease).   - continue PPI daily.

## 2022-09-21 NOTE — PROGRESS NOTE ADULT - PROBLEM SELECTOR PLAN 4
- Patient's Latuda and Abilify recently stopped per my discussion with patients RN Fidel, and 25mg dose of Thorazine also DC'd  - she also has schizoaffective disorder   - continue home dose 50mg Thorazine QID (7a, 11a, 4pm, 8pm) with hold parameters   - continue home dose Seroquel 200mg 8am, 8pm, and 50mg 12 noon   - takes ativan 0.5mg PO BID as needed at group home, hold for the moment as patient is sleepy after haldol   - psychiatry evaluation called given ongoing likely PNES and recent medication changes

## 2022-09-21 NOTE — BH CONSULTATION LIAISON PROGRESS NOTE - OTHER
Focused on pain and itching concrete/goal directed limited Focused on pain and itching  Wants to go back to [facility] variable  calm at the time of evaluation

## 2022-09-21 NOTE — DISCHARGE NOTE PROVIDER - NSDCCPCAREPLAN_GEN_ALL_CORE_FT
PRINCIPAL DISCHARGE DIAGNOSIS  Diagnosis: Seizure  Assessment and Plan of Treatment: Seen by Neurology- determine to be Psuedo-siezures- no further w/u       PRINCIPAL DISCHARGE DIAGNOSIS  Diagnosis: Pelvic pain  Assessment and Plan of Treatment: Resolved with warm packs tylenol prn. Urine - no leukocte or nitrate  MD at group home to determine stop date  bactrim that she was taking on arrival      SECONDARY DISCHARGE DIAGNOSES  Diagnosis: Pelvic pain  Assessment and Plan of Treatment: Resolved with warm packs tylenol prn. Urine - no leukocte or nitrate  MD at group home to determine stop date  bactrim that she was taking on arrival

## 2022-09-21 NOTE — SWALLOW BEDSIDE ASSESSMENT ADULT - COMMENTS
HPI: Assessment "36 y/o F with a PMHx of Factor V Leiden deficiency a/w DVT on Eliquis, history of seizure disorder in childhood, electrographically confirmed psychogenic nonepileptic seizures (during [facility] EMU admission 7/8-7/11/22), bipolar disorder, insomnia, asthma, diabetes, HTN, endometriosis, and hypothyroidism presents to the ED initially for body aches, right ear discomfort - now resolved with plan for discharge from ED, then with unresponsiveness followed by seizure activity while in the ED."    WBC is WFL. CXR 9/11 "No focal consolidation."    Patient seen at bedside, awake/alert and oriented, during clinical swallow evaluation this AM. Patient able to follow directions and answer questions. Patient denied odynophagia, globus sensation, or difficulty swallowing. HPI: Assessment "38 y/o F with a PMHx of Factor V Leiden deficiency a/w DVT on Eliquis, history of seizure disorder in childhood, electrographically confirmed psychogenic nonepileptic seizures (during [facility] EMU admission 7/8-7/11/22), bipolar disorder, insomnia, asthma, diabetes, HTN, endometriosis, and hypothyroidism presents to the ED initially for body aches, right ear discomfort - now resolved with plan for discharge from ED, then with unresponsiveness followed by seizure activity while in the ED."    WBC is WFL. CXR 9/11 "No focal consolidation."    Patient known to this service as patient was seen on a previous admission for bedside swallow evaluation on 7/6/22 with recommendations for Regular solids and thin liquids (see report for details).     Patient seen at bedside, awake/alert and oriented, during clinical swallow evaluation this AM. Patient able to follow directions and answer questions. Patient denied odynophagia, globus sensation, or difficulty swallowing.

## 2022-09-21 NOTE — DISCHARGE NOTE PROVIDER - CARE PROVIDER_API CALL
MD at group home,   Phone: (   )    -  Fax: (   )    -  Follow Up Time:    MD at group home,   Phone: (   )    -  Fax: (   )    -  Follow Up Time: 1 week    PCP Candy Maynard,   Phone: [phone]  Fax: (   )    -  Established Patient  Follow Up Time: 1 week

## 2022-09-21 NOTE — CHART NOTE - NSCHARTNOTEFT_GEN_A_CORE
Patient was seen after code flight called. Agitation. Made statements of wanting to overdose at home when brought back to her room.     Patient seen in her bed with security at bedside. Patient is awake, alert after haldol 5mg given. Has moment of provokative statement,  however is ultimately cooperative with interview.     Patient is reporting being upset about being offered Tylenol and not percocet for leg pain. States she feels no one is listening to her. This caused frustration and patient states she said she would overdose during that moment of frustration, but does not have actual intent of wanting ot harm herself. Patient states multiple times she wants treatment and she wants help. Patient becomes tearful and says "I want to get better."      PLAN is as follows... discussed w dr francisco  --Continue Patient's current psychiatric regimen as written:    Chlorpromazine 50mg QID , Seroquel 50mg once a day and Seroquel 200mg bid  (8AM and 8PM), trileptal 150mg bid and  Ativan 0.5mg BID PRN for anxiety  -PRN Haldol 5mg q6hrs . When appropriate, encourage Pt to take PO as she becomes agitated with IM.  If patient remains with refractory agitation, can give benadryl 25mg.   -HOLD antipsychotic if qtc >500  -Continue CO 1:1 for h/o aggression/impulsivity  -No Loose or sharp objects and no metal utensils.   -Dispo: does not need an inpatient psychiatric admission at this time, should be discharged back to  once medically optimized and if remains psychiatrically stable

## 2022-09-21 NOTE — DISCHARGE NOTE PROVIDER - NSDCMRMEDTOKEN_GEN_ALL_CORE_FT
acetaminophen 325 mg oral tablet: 2 tab(s) orally every 6 hours, As needed  Albuterol (Eqv-Proventil HFA) 90 mcg/inh inhalation aerosol: 2 puff(s) inhaled every 4 hours, As Needed  Benadryl 25 mg oral capsule: 1 cap(s) orally every 6 hours, As Needed  chlorproMAZINE 50 mg oral tablet: 1 tab(s) orally 4 times a day (7am, 11am, 4pm, 8pm)  docusate sodium 100 mg oral capsule: 1 cap(s) orally once a day (at bedtime), As Needed for constipation  Eliquis 2.5 mg oral tablet: 1 tab(s) orally 2 times a day  EPINEPHrine: 0.3mL IM PRN anaphylaxis   Eucerin topical lotion: as needed   Flovent 110 mcg/inh inhalation aerosol with adapter: 1 puff(s) inhaled once a day  fluticasone 50 mcg/inh nasal spray: 1 spray(s) nasal 2 times a day  levothyroxine 75 mcg (0.075 mg) oral tablet: 1 tab(s) orally once a day  loratadine: 1 tab daily PRN for allergies   LORazepam 0.5 mg oral tablet: 1 tab(s) orally 2 times a day, As Needed  Melatonin 3 mg oral tablet: 1 tab(s) orally once a day (at bedtime)  metoclopramide 5 mg oral tablet: 1 tab(s) orally once a day at 7am prior to daily antibiotics  metroNIDAZOLE 0.75% topical lotion: Apply topically to affected area once a day (at bedtime)  Multiple Vitamins with Iron oral tablet: 1 tab(s) orally once a day  olopatadine 0.1% ophthalmic solution: 1 drop(s) to each affected eye 2 times a day, As Needed  oxycodone-acetaminophen 5 mg-325 mg oral tablet: 1 tab(s) orally every 6 hours, As Needed  pantoprazole 40 mg oral delayed release tablet: 1 tab(s) orally once a day  polyethylene glycol 3350 oral powder for reconstitution: 17 gram(s) orally once a day, As Needed  Probiotic Formula oral capsule: 1 cap(s) orally once a day  QUEtiapine 200 mg oral tablet: 1 tab(s) orally 2 times a day at 8 am and 8pm  QUEtiapine 50 mg oral tablet: 1 tab(s) orally once a day at noon  Reguloid 3.4 g/5.2 g oral powder for reconstitution: HS PRN   sulfamethoxazole-trimethoprim DS: 1 tab(s) orally once a day  Trileptal 150 mg oral tablet: 1 tab(s) orally 2 times a day MDD:300mg  Vitamin D2 1.25 mg (50,000 intl units) oral capsule: 1 cap(s) orally once a week sunday   Benadryl 25 mg oral capsule: 1 cap(s) orally every 6 hours, As Needed  chlorproMAZINE 50 mg oral tablet: 1 tab(s) orally 4 times a day (7am, 11am, 4pm, 8pm)  cyclobenzaprine 10 mg oral tablet: 1 tab(s) orally 3 times a day  Eliquis 2.5 mg oral tablet: 1 tab(s) orally 2 times a day  Flovent 110 mcg/inh inhalation aerosol with adapter: 1 puff(s) inhaled once a day  fluticasone 50 mcg/inh nasal spray: 1 spray(s) nasal 2 times a day  levothyroxine 75 mcg (0.075 mg) oral tablet: 1 tab(s) orally once a day  loratadine: 1 tab daily PRN for allergies   LORazepam 0.5 mg oral tablet: 1 tab(s) orally 2 times a day, As Needed  Melatonin 3 mg oral tablet: 1 tab(s) orally once a day (at bedtime)  Multiple Vitamins oral tablet: 1 tab(s) orally once a day  oxycodone-acetaminophen 5 mg-325 mg oral tablet: 1 tab(s) orally every 6 hours, As Needed  pantoprazole 40 mg oral delayed release tablet: 1 tab(s) orally once a day  Probiotic Formula oral capsule: 1 cap(s) orally once a day  QUEtiapine 200 mg oral tablet: 1 tab(s) orally 2 times a day at 8 am and 8pm  QUEtiapine 50 mg oral tablet: 1 tab(s) orally once a day at noon  sulfamethoxazole-trimethoprim DS: 1 tab(s) orally once a day  Trileptal 150 mg oral tablet: 1 tab(s) orally 2 times a day MDD:300mg   Benadryl 25 mg oral capsule: 1 cap(s) orally every 6 hours, As Needed  chlorproMAZINE 50 mg oral tablet: 1 tab(s) orally 4 times a day (7am, 11am, 4pm, 8pm)  cyclobenzaprine 10 mg oral tablet: 1 tab(s) orally 3 times a day  Eliquis 2.5 mg oral tablet: 1 tab(s) orally 2 times a day  Flovent 110 mcg/inh inhalation aerosol with adapter: 1 puff(s) inhaled once a day  fluticasone 50 mcg/inh nasal spray: 1 spray(s) nasal 2 times a day  loratadine: 1 tab daily PRN for allergies   LORazepam 0.5 mg oral tablet: 1 tab(s) orally 2 times a day, As Needed  Melatonin 3 mg oral tablet: 1 tab(s) orally once a day (at bedtime)  oxycodone-acetaminophen 5 mg-325 mg oral tablet: 1 tab(s) orally every 6 hours, As Needed  pantoprazole 40 mg oral delayed release tablet: 1 tab(s) orally once a day  Probiotic Formula oral capsule: 1 cap(s) orally once a day  QUEtiapine 200 mg oral tablet: 1 tab(s) orally 2 times a day at 8 am and 8pm  QUEtiapine 50 mg oral tablet: 1 tab(s) orally once a day at noon  sulfamethoxazole-trimethoprim DS: 1 tab(s) orally once a day  Trileptal 150 mg oral tablet: 1 tab(s) orally 2 times a day MDD:300mg

## 2022-09-21 NOTE — BH CONSULTATION LIAISON PROGRESS NOTE - NSBHFUPINTERVALHXFT_PSY_A_CORE
Pt was interactive and responsive to questions during interview. Pt's current mood was feeling upset because she felt itchy and wanted benadryl. Pt recalled the episode of agitation in the ED last night. She said that she was upset last night because she was in a lot of pain, all over her body, and asked for stronger pain medication than the tylenol she received. When she wasn't receiving her desired medication, she grew frustrated. She received IM medication, and previously had a negative experience with IM after developing an abscess on her arm after a previous IM injection. When the team gave her the IM medication, she became angry and agitated. She got out of bed and threw objects and hit the computer. Today, Pt stated that she felt bad about her behavior and recognized a better coping mechanism for her feelings is to talk to people. She stated that even when she feels other people are not listening, she will try speaking. Pt stated she wanted to stay in the hospital because of her seizures. Team told Pt that we spoke to her caretakers at the group home and her psychiatrist, who said they feel that she is ready to come home. Pt is easily redirectable and responsive to guidance. She stated that she felt that her home environment would be good for her. Team encouraged her to take medications PO, as she dislikes IM. On mental status examination, Pt was AAOx2, did not know the month or year. Pt denies SI, HI, AV, and VH. Patient denies paranoia or delusions.    Pt was interactive and responsive to questions during interview. Pt's current mood was feeling upset because she felt itchy and wanted benadryl. Pt recalled the episode of agitation in the ED last night. She said that she was upset last night because she was in a lot of pain, all over her body, and asked for stronger pain medication than the tylenol she received. When she wasn't receiving her desired medication, she grew frustrated. As per patient then she received IM medication. When the team gave her the IM medication, as per patient she became angry and agitated, got out of bed and threw objects and hit the computer. Today, Pt stated that she felt bad about her behavior and recognized a better coping mechanism for her feelings is to talk to people. She stated that even when she feels other people are not listening, she will try speaking. Pt stated she wanted to stay in the hospital because of her seizures. Team told Pt that we spoke to her caretakers at the group home and her psychiatrist. Pt is easily redirectable and responsive to guidance. She stated that she felt that her home environment would be good for her. Team encouraged her to take medications PO, as she dislikes IM. On mental status examination, Pt was AAOx2, did not know the month or year. Pt denies SI, HI, AV, and VH. Patient denies paranoia or delusions.

## 2022-09-21 NOTE — DISCHARGE NOTE PROVIDER - PROVIDER TOKENS
FREE:[LAST:[MD at group home],PHONE:[(   )    -],FAX:[(   )    -]] FREE:[LAST:[MD at group home],PHONE:[(   )    -],FAX:[(   )    -],FOLLOWUP:[1 week]],FREE:[LAST:[PCP Candy Maynard],PHONE:[phone]:[(   )    -],FOLLOWUP:[1 week],ESTABLISHEDPATIENT:[T]]

## 2022-09-21 NOTE — PROGRESS NOTE ADULT - PROBLEM SELECTOR PLAN 10
Chart and Labs reviewed.  Patient is optimized to return to [facility]  No seizure- Neuro s/o  no psych in patient needed  Return to [facility]

## 2022-09-21 NOTE — BH CONSULTATION LIAISON PROGRESS NOTE - NSBHASSESSMENTFT_PSY_ALL_CORE
38 y/o F with a PMHx of Factor V Leiden deficiency a/w DVT/PE in 2016 on Eliquis, history of seizure disorder in childhood, h/o psychogenic nonepileptic seizures  as per chart h/o bipolar disorder, intellectual disability,  personality d/o, mood d/o, multiple inpatient psychiatric admissions most recent [place] 7/20-8/21, hx crack/cocaine use dos, prior legal issues (weapons possession, harassment charges), two prior SA (overdose 2017),  asthma, diabetes, HTN, GERD, endometriosis, and hypothyroidism presented to the ED for right ear pain, now resolved with plan for discharge from ED, then with unresponsiveness followed by seizure activity while in the ED. Pt's nurse and manager from [facility] and psychiatrist believe that she can be discharged and return to her group home. Given her intellectual disability and emotional dysregulation and impulsiveness, returning to her group home is the preferred next step, as the structured situation, familiar faces, and established routine will be a better environment for her mental stability.     PLAN  --Continue Patient's current psychiatric regimen as written: Chlorpromazine 50mg QID , Seroquel 50mg once a day and Seroquel 200mg bid  (8AM and 8PM), trileptal 150mg bid and  Ativan 0.5mg BID PRN for anxiety  -PRN Haldol 2.5mg q4h PO/IM/IV for agitation with an additional 2.5mg if first dose is not effective. When appropriate, encourage Pt to take PO as she becomes agitated with IM.    -HOLD antipsychotic if qtc >500  -Continue CO 1:1 for h/o aggression  -No Loose or sharp objects and no metal utensils.   -Dispo: does not need an inpatient psychiatric admission at this time, should be discharged back to  once medically optimized and if remains psychiatrically stable    If any change in patient's behavior please call c/l Psychiatry at #[phone] 36 y/o F with a PMHx of Factor V Leiden deficiency a/w DVT/PE in 2016 on Eliquis, history of seizure disorder in childhood, h/o psychogenic nonepileptic seizures  as per chart h/o bipolar disorder, intellectual disability,  personality d/o, mood d/o, multiple inpatient psychiatric admissions most recent [place] 7/20-8/21, hx crack/cocaine use dos, prior legal issues (weapons possession, harassment charges), two prior SA (overdose 2017),  asthma, diabetes, HTN, GERD, endometriosis, and hypothyroidism presented to the ED for right ear pain. Patient seen/evaluated, she is calm/well engaged, denies SI and HI. Denies acute psychotic sxs. Pt's nurse and manager from [facility] and psychiatrist believe that she can return to her group home. Given her h/o intellectual disability and emotional dysregulation and impulsiveness, returning to her group home is the preferred next step once medically optimized, as the structured situation, familiar faces, and established routine will be a better environment for her mental stability.     PLAN  --Continue Patient's current psychiatric regimen as written:    Chlorpromazine 50mg QID , Seroquel 50mg once a day and Seroquel 200mg bid  (8AM and 8PM), trileptal 150mg bid and  Ativan 0.5mg BID PRN for anxiety  -PRN Haldol 2.5mg q4h PO/IM/IV for agitation with an additional 2.5mg if first dose is not effective. When appropriate, encourage Pt to take PO as she becomes agitated with IM.    -HOLD antipsychotic if qtc >500  -Continue CO 1:1 for h/o aggression/impulsivity  -No Loose or sharp objects and no metal utensils.   -Dispo: does not need an inpatient psychiatric admission at this time, should be discharged back to  once medically optimized and if remains psychiatrically stable    If any change in patient's behavior or any acute concerns  please call c/l Psychiatry at #[phone]

## 2022-09-21 NOTE — BH CONSULTATION LIAISON PROGRESS NOTE - PRN MEDS
PRN 2.5mg Haldol IVP given once for aggression at 21:00 9/20 PRN 2.5mg Haldol IV given once for agitation at 21:00 9/20

## 2022-09-21 NOTE — PROGRESS NOTE ADULT - PROBLEM SELECTOR PLAN 2
- Patient reports mild RLQ abdominal/R pelvic pain since being in ED  - denies problems urinating or moving bowels  - UA appears to be negative, f/u UCx   - abdominal exam is benign and patient denies tenderness upon palpation   - will give Tylenol PRN and re-assess   - if persistent/worsening symptoms, consider CT A/P vs pelvic sonogram - Patient reports mild RLQ abdominal/R pelvic pain since being in ED  - denies problems urinating or moving bowels  - UA appears to be negative.  - abdominal exam is benign and patient denies tenderness upon palpation   - will give Tylenol PRN and re-assess   - 9/21 no tenderness on exam  Can c/w tylenol and return to facility

## 2022-09-21 NOTE — DISCHARGE NOTE PROVIDER - NSDCFUSCHEDAPPT_GEN_ALL_CORE_FT
[facility]  PULED [address]  Scheduled Appointment: 09/26/2022    Omar Hernandez  [facility] [address]  Scheduled Appointment: 09/26/2022    Denis English  [facility]  NEUROLOGY [address]  Scheduled Appointment: 09/28/2022    Rohan Braxton  [facility]  INTMED [address]  Scheduled Appointment: 09/29/2022

## 2022-09-21 NOTE — SWALLOW BEDSIDE ASSESSMENT ADULT - ASR SWALLOW RECOMMEND DIAG
Objective testing NOT warranted given no observed s/sx of penetration/aspiration and clear chest imaging

## 2022-09-22 LAB
GLUCOSE BLDC GLUCOMTR-MCNC: 82 MG/DL — SIGNIFICANT CHANGE UP (ref 70–99)
GLUCOSE BLDC GLUCOMTR-MCNC: 97 MG/DL — SIGNIFICANT CHANGE UP (ref 70–99)
GLUCOSE BLDC GLUCOMTR-MCNC: 98 MG/DL — SIGNIFICANT CHANGE UP (ref 70–99)
HCG UR QL: NEGATIVE — SIGNIFICANT CHANGE UP

## 2022-09-22 PROCEDURE — 99233 SBSQ HOSP IP/OBS HIGH 50: CPT

## 2022-09-22 PROCEDURE — 99232 SBSQ HOSP IP/OBS MODERATE 35: CPT

## 2022-09-22 PROCEDURE — 74176 CT ABD & PELVIS W/O CONTRAST: CPT | Mod: 26

## 2022-09-22 RX ORDER — DIPHENHYDRAMINE HCL 50 MG
25 CAPSULE ORAL ONCE
Refills: 0 | Status: COMPLETED | OUTPATIENT
Start: 2022-09-22 | End: 2022-09-22

## 2022-09-22 RX ORDER — ACETAMINOPHEN 500 MG
1000 TABLET ORAL ONCE
Refills: 0 | Status: COMPLETED | OUTPATIENT
Start: 2022-09-22 | End: 2022-09-22

## 2022-09-22 RX ORDER — LORATADINE 10 MG/1
10 TABLET ORAL DAILY
Refills: 0 | Status: DISCONTINUED | OUTPATIENT
Start: 2022-09-23 | End: 2022-09-23

## 2022-09-22 RX ORDER — SODIUM CHLORIDE 9 MG/ML
500 INJECTION INTRAMUSCULAR; INTRAVENOUS; SUBCUTANEOUS ONCE
Refills: 0 | Status: DISCONTINUED | OUTPATIENT
Start: 2022-09-22 | End: 2022-09-23

## 2022-09-22 RX ORDER — CYCLOBENZAPRINE HYDROCHLORIDE 10 MG/1
10 TABLET, FILM COATED ORAL THREE TIMES A DAY
Refills: 0 | Status: DISCONTINUED | OUTPATIENT
Start: 2022-09-22 | End: 2022-09-23

## 2022-09-22 RX ADMIN — Medication 1 TABLET(S): at 12:31

## 2022-09-22 RX ADMIN — QUETIAPINE FUMARATE 200 MILLIGRAM(S): 200 TABLET, FILM COATED ORAL at 19:58

## 2022-09-22 RX ADMIN — Medication 50 MILLIGRAM(S): at 19:58

## 2022-09-22 RX ADMIN — CYCLOBENZAPRINE HYDROCHLORIDE 10 MILLIGRAM(S): 10 TABLET, FILM COATED ORAL at 14:54

## 2022-09-22 RX ADMIN — Medication 3 MILLIGRAM(S): at 21:02

## 2022-09-22 RX ADMIN — Medication 75 MICROGRAM(S): at 06:55

## 2022-09-22 RX ADMIN — CYCLOBENZAPRINE HYDROCHLORIDE 10 MILLIGRAM(S): 10 TABLET, FILM COATED ORAL at 21:03

## 2022-09-22 RX ADMIN — Medication 50 MILLIGRAM(S): at 18:28

## 2022-09-22 RX ADMIN — APIXABAN 2.5 MILLIGRAM(S): 2.5 TABLET, FILM COATED ORAL at 06:55

## 2022-09-22 RX ADMIN — OXCARBAZEPINE 150 MILLIGRAM(S): 300 TABLET, FILM COATED ORAL at 18:32

## 2022-09-22 RX ADMIN — OXCARBAZEPINE 150 MILLIGRAM(S): 300 TABLET, FILM COATED ORAL at 06:54

## 2022-09-22 RX ADMIN — Medication 25 MILLIGRAM(S): at 22:24

## 2022-09-22 RX ADMIN — APIXABAN 2.5 MILLIGRAM(S): 2.5 TABLET, FILM COATED ORAL at 18:28

## 2022-09-22 RX ADMIN — Medication 50 MILLIGRAM(S): at 12:30

## 2022-09-22 RX ADMIN — QUETIAPINE FUMARATE 50 MILLIGRAM(S): 200 TABLET, FILM COATED ORAL at 12:32

## 2022-09-22 RX ADMIN — Medication 1 TABLET(S): at 12:32

## 2022-09-22 RX ADMIN — Medication 25 MILLIGRAM(S): at 15:41

## 2022-09-22 RX ADMIN — Medication 1 SPRAY(S): at 18:27

## 2022-09-22 RX ADMIN — Medication 50 MILLIGRAM(S): at 06:56

## 2022-09-22 RX ADMIN — Medication 1 SPRAY(S): at 06:55

## 2022-09-22 RX ADMIN — PANTOPRAZOLE SODIUM 40 MILLIGRAM(S): 20 TABLET, DELAYED RELEASE ORAL at 06:55

## 2022-09-22 NOTE — BH CONSULTATION LIAISON PROGRESS NOTE - NSBHCONSULTFOLLOWAFTERCARE_PSY_A_CORE FT
Follow up with outpatient psychiatrist Dr. Foreign Weiner [phone] at the [facility]  
Follow up with outpatient psychiatrist Dr. Foreign Weiner [phone] at the [facility]

## 2022-09-22 NOTE — BH CONSULTATION LIAISON PROGRESS NOTE - NSBHCHARTREVIEWLAB_PSY_A_CORE FT
11.4   4.36  )-----------( 185      ( 21 Sep 2022 10:07 )             35.4       09-21    133<L>  |  99  |  10  ----------------------------<  89  TNP   |  20<L>  |  0.74    Ca    9.3      21 Sep 2022 10:07    TPro  TNP  /  Alb  4.6  /  TBili  0.3  /  DBili  x   /  AST  87<H>  /  ALT  8   /  AlkPhos  40  09-21                            CAPILLARY BLOOD GLUCOSE      POCT Blood Glucose.: 105 mg/dL (21 Sep 2022 22:57)    
CBC Full  -  ( 21 Sep 2022 10:07 )  WBC Count : 4.36 K/uL  RBC Count : 3.85 M/uL  Hemoglobin : 11.4 g/dL  Hematocrit : 35.4 %  Platelet Count - Automated : 185 K/uL  Mean Cell Volume : 91.9 fL  Mean Cell Hemoglobin : 29.6 pg  Mean Cell Hemoglobin Concentration : 32.2 gm/dL  Auto Neutrophil # : x  Auto Lymphocyte # : x  Auto Monocyte # : x  Auto Eosinophil # : x  Auto Basophil # : x  Auto Neutrophil % : x  Auto Lymphocyte % : x  Auto Monocyte % : x  Auto Eosinophil % : x  Auto Basophil % : x  09-21    133<L>  |  99  |  10  ----------------------------<  89  TNP   |  20<L>  |  0.74    Ca    9.3      21 Sep 2022 10:07  Phos  4.3     09-20  Mg     1.90     09-20    TPro  TNP  /  Alb  4.6  /  TBili  0.3  /  DBili  x   /  AST  87<H>  /  ALT  8   /  AlkPhos  40  09-21

## 2022-09-22 NOTE — BH CONSULTATION LIAISON PROGRESS NOTE - NSBHCHARTREVIEWVS_PSY_A_CORE FT
Vital Signs Last 24 Hrs  T(C): 36.6 (21 Sep 2022 05:13), Max: 36.7 (20 Sep 2022 12:40)  T(F): 97.8 (21 Sep 2022 05:13), Max: 98.1 (20 Sep 2022 12:40)  HR: 96 (21 Sep 2022 05:13) (85 - 110)  BP: 104/71 (21 Sep 2022 05:13) (104/71 - 126/74)  BP(mean): --  RR: 20 (21 Sep 2022 05:13) (18 - 20)  SpO2: 100% (21 Sep 2022 05:13) (100% - 100%)    Parameters below as of 21 Sep 2022 05:13  Patient On (Oxygen Delivery Method): nasal cannula  O2 Flow (L/min): 2  
Vital Signs Last 24 Hrs  T(C): 36.4 (22 Sep 2022 06:35), Max: 36.6 (21 Sep 2022 11:22)  T(F): 97.6 (22 Sep 2022 06:35), Max: 97.9 (21 Sep 2022 11:22)  HR: 101 (22 Sep 2022 06:35) (87 - 103)  BP: 107/66 (22 Sep 2022 06:35) (106/71 - 117/87)  BP(mean): --  RR: 18 (22 Sep 2022 06:35) (18 - 19)  SpO2: 100% (22 Sep 2022 06:35) (99% - 100%)    Parameters below as of 21 Sep 2022 23:00  Patient On (Oxygen Delivery Method): nasal cannula  O2 Flow (L/min): 2

## 2022-09-22 NOTE — BH CONSULTATION LIAISON PROGRESS NOTE - NSBHASSESSMENTFT_PSY_ALL_CORE
38 y/o F with a PMHx of Factor V Leiden deficiency a/w DVT/PE in 2016 on Eliquis, history of seizure disorder in childhood, h/o psychogenic nonepileptic seizures  as per chart h/o bipolar disorder, intellectual disability,  personality d/o, mood d/o, multiple inpatient psychiatric admissions most recent [place] 7/20-8/21, hx crack/cocaine use dos, prior legal issues (weapons possession, harassment charges), two prior SA (overdose 2017),  asthma, diabetes, HTN, GERD, endometriosis, and hypothyroidism presented to the ED for right ear pain. Patient seen/evaluated, she is calm/well engaged, denies SI and HI. Denies acute psychotic sxs. Pt's nurse and manager from [facility] and psychiatrist believe that she can return to her group home. Given her h/o intellectual disability and emotional dysregulation and impulsiveness, returning to her group home is the preferred next step once medically optimized, as the structured situation, familiar faces, and established routine will be a better environment for her mental stability.     9/22: Pt calm, cooperative with interview. Maintains that suicidal statement was made only in the context of pain, without any intent. Denies any current active or passive SI, intent or plan. Collateral obtained from  and nurse (See  Assessment note 9/20) corroborates that pt does not have hx of SI and can be medication seeking. Pt has combination of impulsivity and low frustration tolerance which give her increase risk of making provocative statements when she feel that her needs are not meet. However, at this time, pt appears to be near, if not at, baseline and therefore there are no psychiatric contraindications to discharge. There is no indication for inpatient psychiatric admission, pt should be dc back to group home for further management.     PLAN  --Continue Patient's current psychiatric regimen as written:    Chlorpromazine 50mg QID , Seroquel 50mg once a day and Seroquel 200mg bid  (8AM and 8PM), trileptal 150mg bid and  Ativan 0.5mg BID PRN for anxiety  -PRN Haldol 5mg q6hrs . When appropriate, encourage Pt to take PO as she becomes agitated with IM.  If patient remains with refractory agitation, can give benadryl 25mg.   -HOLD antipsychotic if qtc >500  -Continue CO 1:1 for h/o aggression/impulsivity  -No Loose or sharp objects and no metal utensils.   -Dispo: does not need an inpatient psychiatric admission at this time, should be discharged back to  once medically optimized  38 y/o F with a PMHx of Factor V Leiden deficiency a/w DVT/PE in 2016 on Eliquis, history of seizure disorder in childhood, h/o psychogenic nonepileptic seizures  as per chart h/o bipolar disorder, intellectual disability,  personality d/o, mood d/o, multiple inpatient psychiatric admissions most recent [place] 7/20-8/21, hx crack/cocaine use dos, prior legal issues (weapons possession, harassment charges), two prior SA (overdose 2017),  asthma, diabetes, HTN, GERD, endometriosis, and hypothyroidism presented to the ED for right ear pain. Patient seen/evaluated, she is calm/well engaged, denies SI and HI. Denies acute psychotic sxs. Pt's nurse and manager from [facility] and psychiatrist believe that she can return to her group home. Given her h/o intellectual disability and emotional dysregulation and impulsiveness, returning to her group home is the preferred next step once medically optimized, as the structured situation, familiar faces, and established routine will be a better environment for her mental stability.     9/22: Pt calm, cooperative with interview. Maintains that suicidal statement was made only in the context of pain/frustration, without any intent. Denies any current active or passive SI, intent or plan. Collateral obtained from  and nurse (See  Assessment note 9/20) , at baseline, Pt is generally pleasant, not agitated, and easy to work with, can be medication seeking. Pt has combination of impulsivity, poor coping skills and low frustration tolerance which give her increase risk of making provocative statements when she feel that her needs are not meet. However, at this time, pt appears to be near, if not at, baseline and therefore there are no psychiatric contraindications to discharge. There is no indication for inpatient psychiatric admission, pt should be dc back to group home for further management once she is medically optimized.     PLAN  --Continue Patient's current psychiatric regimen as written:    Chlorpromazine 50mg QID , Seroquel 50mg once a day and Seroquel 200mg bid  (8AM and 8PM), trileptal 150mg bid and  Ativan 0.5mg BID PRN for anxiety  -PRN Haldol 5mg q6hrs . When appropriate, encourage Pt to take PO as she becomes agitated with IM.  If patient remains with refractory agitation, can give benadryl 25mg.   -HOLD antipsychotic if qtc >500  -Continue CO 1:1 for h/o aggression/impulsivity  -No Loose or sharp objects and no metal utensils.   -Dispo: does not need an inpatient psychiatric admission at this time, should be discharged back to  once medically optimized

## 2022-09-22 NOTE — PROGRESS NOTE ADULT - PROBLEM SELECTOR PLAN 2
- Patient reports mild RLQ abdominal/R pelvic pain since being in ED  - denies problems urinating or moving bowels  - UA appears to be negative.  - abdominal exam is benign and patient denies tenderness upon palpation   - will give Tylenol PRN and re-assess   - 9/21 no tenderness on exam  Can c/w tylenol and tried to return to facility 9/21- code flight called -> RN reported patient threatened to kill herself when she left due to "pain not controled"  Ct of a/p ordered stat  Flexeril tid  f/u pain  may need chronic pain consult

## 2022-09-22 NOTE — PROGRESS NOTE ADULT - SUBJECTIVE AND OBJECTIVE BOX
Patient is a 37y old  Female who presents with a chief complaint of Seizure like activity in the ED (21 Sep 2022 12:40)      SUBJECTIVE / OVERNIGHT EVENTS:  Awaoke patient from sleep with NC oxygen on--> " she likes the oxygen""---> MD took oxygen off  Still notes RLQ pain , but REFUSE IV for IV contrast---> will get w/o contrast  notes she gets percocet from hospitals  note She did not get muscle relaxant- RN not giving her- will change to standing  seen with 1:1 at bedside    MEDICATIONS  (STANDING):  acetaminophen   IVPB .. 1000 milliGRAM(s) IV Intermittent once  apixaban 2.5 milliGRAM(s) Oral every 12 hours  chlorproMAZINE    Tablet 50 milliGRAM(s) Oral <User Schedule>  dextrose 5%. 1000 milliLiter(s) (50 mL/Hr) IV Continuous <Continuous>  dextrose 5%. 1000 milliLiter(s) (100 mL/Hr) IV Continuous <Continuous>  dextrose 50% Injectable 25 Gram(s) IV Push once  dextrose 50% Injectable 12.5 Gram(s) IV Push once  dextrose 50% Injectable 25 Gram(s) IV Push once  fluticasone propionate 50 MICROgram(s)/spray Nasal Spray 1 Spray(s) Both Nostrils two times a day  influenza   Vaccine 0.5 milliLiter(s) IntraMuscular once  lactobacillus acidophilus 1 Tablet(s) Oral daily  levothyroxine 75 MICROGram(s) Oral daily  melatonin 3 milliGRAM(s) Oral at bedtime  multivitamin 1 Tablet(s) Oral daily  OXcarbazepine 150 milliGRAM(s) Oral two times a day  pantoprazole    Tablet 40 milliGRAM(s) Oral before breakfast  QUEtiapine 200 milliGRAM(s) Oral <User Schedule>  QUEtiapine 50 milliGRAM(s) Oral <User Schedule>  trimethoprim  160 mG/sulfamethoxazole 800 mG 1 Tablet(s) Oral daily    MEDICATIONS  (PRN):  acetaminophen     Tablet .. 650 milliGRAM(s) Oral every 6 hours PRN Severe Pain (7 - 10)  cyclobenzaprine 10 milliGRAM(s) Oral three times a day PRN Muscle Spasm  dextrose Oral Gel 15 Gram(s) Oral once PRN Blood Glucose LESS THAN 70 milliGRAM(s)/deciliter  dextrose Oral Gel 15 Gram(s) Oral once PRN Blood Glucose LESS THAN 70 milliGRAM(s)/deciliter  haloperidol    Injectable 2.5 milliGRAM(s) IV Push once PRN aggression  haloperidol    Injectable 5 milliGRAM(s) IntraMuscular every 6 hours PRN Agitation  LORazepam     Tablet 0.5 milliGRAM(s) Oral two times a day PRN Anxiety  polyethylene glycol 3350 17 Gram(s) Oral daily PRN Constipation        CAPILLARY BLOOD GLUCOSE  POCT Blood Glucose.: 98 mg/dL (22 Sep 2022 11:26)  POCT Blood Glucose.: 105 mg/dL (21 Sep 2022 22:57)  POCT Blood Glucose.: 90 mg/dL (21 Sep 2022 16:45)  POCT Blood Glucose.: 97 mg/dL (21 Sep 2022 12:18)    I&O's Summary    21 Sep 2022 07:01  -  22 Sep 2022 07:00  --------------------------------------------------------  IN: 450 mL / OUT: 0 mL / NET: 450 mL      Vital Signs Last 24 Hrs  T(C): 36.7 (22 Sep 2022 11:29), Max: 36.7 (22 Sep 2022 11:29)  T(F): 98.1 (22 Sep 2022 11:29), Max: 98.1 (22 Sep 2022 11:29)  HR: 80 (22 Sep 2022 11:29) (80 - 103)  BP: 112/69 (22 Sep 2022 11:29) (107/66 - 117/87)  BP(mean): --  RR: 18 (22 Sep 2022 11:29) (18 - 19)  SpO2: 100% (22 Sep 2022 11:29) (100% - 100%)    Parameters below as of 22 Sep 2022 11:29  Patient On (Oxygen Delivery Method): nasal cannula  O2 Flow (L/min): 2    PHYSICAL EXAM:  GENERAL: NAD, well-developed  HEAD:  Atraumatic, Normocephalic  EYES: EOMI, PERRLA, conjunctiva and sclera clear  NECK: Supple, No JVD  CHEST/LUNG: Clear to auscultation bilaterally; No wheeze  HEART: Regular rate and rhythm; No murmurs, rubs, or gallops  ABDOMEN: Soft, Nontender, Nondistended; Bowel sounds present  EXTREMITIES:  2+ Peripheral Pulses, No clubbing, cyanosis, or edema  PSYCH: AAOx3  NEUROLOGY: non-focal  SKIN: No rashes or lesions    LABS:                        11.4   4.36  )-----------( 185      ( 21 Sep 2022 10:07 )             35.4     09-21    133<L>  |  99  |  10  ----------------------------<  89  TNP   |  20<L>  |  0.74    Ca    9.3      21 Sep 2022 10:07    TPro  TNP  /  Alb  4.6  /  TBili  0.3  /  DBili  x   /  AST  87<H>  /  ALT  8   /  AlkPhos  40  09-21          RADIOLOGY & ADDITIONAL TESTS:  rad< from: Xray Knee 1 or 2 Views, Right (09.21.22 @ 18:30) >  IMPRESSION:  No fracture, dislocation, or joint effusion.    Preserved joint spaces with smooth and intact articular surfaces. No   joint margin erosions or inta-articular or periarticular calcifications.    Unremarkable quadriceps and patellar tendon shadows.    No destructive osseous lesions or periosteal reaction.    < end of copied text >    Care Discussed with Consultants/Other Providers:

## 2022-09-22 NOTE — ED PROVIDER NOTE - NSICDXPASTSURGICALHX_GEN_ALL_CORE_FT
PAST SURGICAL HISTORY:  No significant past surgical history      O-Z Plasty Text: The defect edges were debeveled with a #15 scalpel blade.  Given the location of the defect, shape of the defect and the proximity to free margins an O-Z plasty (double transposition flap) was deemed most appropriate.  Using a sterile surgical marker, the appropriate transposition flaps were drawn incorporating the defect and placing the expected incisions within the relaxed skin tension lines where possible.    The area thus outlined was incised deep to adipose tissue with a #15 scalpel blade.  The skin margins were undermined to an appropriate distance in all directions utilizing iris scissors.  Hemostasis was achieved with electrocautery.  The flaps were then transposed into place, one clockwise and the other counterclockwise, and anchored with interrupted buried subcutaneous sutures.

## 2022-09-22 NOTE — BH CONSULTATION LIAISON PROGRESS NOTE - NSBHFUPINTERVALHXFT_PSY_A_CORE
Chart reviewed. No acute overnight events, no prns.  Per staff,  no behavioral concerns.     On approach, pt sleeping, arounsable to verbal stim. Calm, cooperative with interview, pleasant. When ask about code grey and suicidal statements, pt states that those comments were made in context of pain and adamantly denies any suicidal intent. Pt denies any current SI, intent or plan. Denies any hx of SI. Pt identifies going outside and being around friends as activities that she looks forward to/enjoys. Able to engage in safety planning conversation. Agrees to approach staff at group home if she experience negative thoughts or increased frustration. Feels safe going home. Denies any AVH. No paranoia or delusional content elicited. No HI.  Chart reviewed. No acute overnight events.  Per staff,  no behavioral concerns.     On approach, pt sleeping, arousable to verbal stim. Calm, cooperative with interview, pleasant. When ask about code grey and suicidal statements, pt states that those comments were made in context of pain and adamantly denies any suicidal ideation, intent or plan. Pt denies any current SI, intent or plan. Denies any hx of SI. Pt identifies going outside and being around friends as activities that she looks forward to/enjoys. Able to engage in safety planning conversation. Agrees to approach staff at group home if she experience negative thoughts or increased frustration. Feels safe going home. Denies any AVH. No paranoia or delusional content elicited. No HI.

## 2022-09-22 NOTE — BH CONSULTATION LIAISON PROGRESS NOTE - NSBHATTESTCOMMENTATTENDFT_PSY_A_CORE
Chart reviewed, events noted, pt. seen/evaluated. I agree with above assessment/plan. Patient calm/cooperative, well engaged, child like with concrete/goal directed  thought process, reports that yesterday she made suicidal statement in frustration and in the setting of pain. Patient further reports that she did not mean it and she was just very upset. Patient currently denies passive or active SIIP, denies HIIP. No evidence of acute psychosis. Patient is future oriented and looking forward to go to her group home and resume her activities. Plan as above.   
Chart reviewed, events noted, pt. seen/evaluated with the student, I agree with above assessment/plan. Patient AAOX2, calm and well engaged, child like with concrete/goal directed thought process. Patient denies feeling depressed, no evidence of brit or psychosis, denies SI and HI. Patient reports that she likes her group home and wants to go back to . Plan as above.

## 2022-09-22 NOTE — BH CONSULTATION LIAISON PROGRESS NOTE - NSBHATTESTBILLINGAW_PSY_A_CORE
[id number] In[facility] care - high complexity - 35 minutes
[id number] In[facility] care - moderate complexity - 25 minutes

## 2022-09-22 NOTE — BH CONSULTATION LIAISON PROGRESS NOTE - CURRENT MEDICATION
MEDICATIONS  (STANDING):  acetaminophen   IVPB .. 1000 milliGRAM(s) IV Intermittent once  apixaban 2.5 milliGRAM(s) Oral every 12 hours  chlorproMAZINE    Tablet 50 milliGRAM(s) Oral <User Schedule>  dextrose 5%. 1000 milliLiter(s) (50 mL/Hr) IV Continuous <Continuous>  dextrose 5%. 1000 milliLiter(s) (100 mL/Hr) IV Continuous <Continuous>  dextrose 50% Injectable 25 Gram(s) IV Push once  dextrose 50% Injectable 12.5 Gram(s) IV Push once  dextrose 50% Injectable 25 Gram(s) IV Push once  fluticasone propionate 50 MICROgram(s)/spray Nasal Spray 1 Spray(s) Both Nostrils two times a day  influenza   Vaccine 0.5 milliLiter(s) IntraMuscular once  lactobacillus acidophilus 1 Tablet(s) Oral daily  levothyroxine 75 MICROGram(s) Oral daily  melatonin 3 milliGRAM(s) Oral at bedtime  multivitamin 1 Tablet(s) Oral daily  OXcarbazepine 150 milliGRAM(s) Oral two times a day  pantoprazole    Tablet 40 milliGRAM(s) Oral before breakfast  QUEtiapine 200 milliGRAM(s) Oral <User Schedule>  QUEtiapine 50 milliGRAM(s) Oral <User Schedule>  trimethoprim  160 mG/sulfamethoxazole 800 mG 1 Tablet(s) Oral daily    MEDICATIONS  (PRN):  acetaminophen     Tablet .. 650 milliGRAM(s) Oral every 6 hours PRN Severe Pain (7 - 10)  cyclobenzaprine 10 milliGRAM(s) Oral three times a day PRN Muscle Spasm  dextrose Oral Gel 15 Gram(s) Oral once PRN Blood Glucose LESS THAN 70 milliGRAM(s)/deciliter  dextrose Oral Gel 15 Gram(s) Oral once PRN Blood Glucose LESS THAN 70 milliGRAM(s)/deciliter  haloperidol    Injectable 2.5 milliGRAM(s) IV Push once PRN aggression  haloperidol    Injectable 5 milliGRAM(s) IntraMuscular every 6 hours PRN Agitation  LORazepam     Tablet 0.5 milliGRAM(s) Oral two times a day PRN Anxiety  polyethylene glycol 3350 17 Gram(s) Oral daily PRN Constipation  
MEDICATIONS  (STANDING):  apixaban 2.5 milliGRAM(s) Oral every 12 hours  chlorproMAZINE    Tablet 50 milliGRAM(s) Oral <User Schedule>  dextrose 5%. 1000 milliLiter(s) (50 mL/Hr) IV Continuous <Continuous>  dextrose 5%. 1000 milliLiter(s) (100 mL/Hr) IV Continuous <Continuous>  dextrose 50% Injectable 25 Gram(s) IV Push once  dextrose 50% Injectable 12.5 Gram(s) IV Push once  dextrose 50% Injectable 25 Gram(s) IV Push once  fluticasone propionate 50 MICROgram(s)/spray Nasal Spray 1 Spray(s) Both Nostrils two times a day  influenza   Vaccine 0.5 milliLiter(s) IntraMuscular once  lactobacillus acidophilus 1 Tablet(s) Oral daily  levothyroxine 75 MICROGram(s) Oral daily  melatonin 3 milliGRAM(s) Oral at bedtime  multivitamin 1 Tablet(s) Oral daily  OXcarbazepine 150 milliGRAM(s) Oral two times a day  pantoprazole    Tablet 40 milliGRAM(s) Oral before breakfast  QUEtiapine 200 milliGRAM(s) Oral <User Schedule>  QUEtiapine 50 milliGRAM(s) Oral <User Schedule>  trimethoprim  160 mG/sulfamethoxazole 800 mG 1 Tablet(s) Oral daily    MEDICATIONS  (PRN):  acetaminophen     Tablet .. 650 milliGRAM(s) Oral every 6 hours PRN Severe Pain (7 - 10)  dextrose Oral Gel 15 Gram(s) Oral once PRN Blood Glucose LESS THAN 70 milliGRAM(s)/deciliter  dextrose Oral Gel 15 Gram(s) Oral once PRN Blood Glucose LESS THAN 70 milliGRAM(s)/deciliter  haloperidol    Injectable 2.5 milliGRAM(s) IV Push once PRN aggression  LORazepam     Tablet 0.5 milliGRAM(s) Oral two times a day PRN Anxiety  polyethylene glycol 3350 17 Gram(s) Oral daily PRN Constipation

## 2022-09-23 ENCOUNTER — TRANSCRIPTION ENCOUNTER (OUTPATIENT)
Age: 37
End: 2022-09-23

## 2022-09-23 VITALS
TEMPERATURE: 98 F | RESPIRATION RATE: 18 BRPM | HEART RATE: 102 BPM | OXYGEN SATURATION: 100 % | DIASTOLIC BLOOD PRESSURE: 81 MMHG | SYSTOLIC BLOOD PRESSURE: 119 MMHG

## 2022-09-23 DIAGNOSIS — M25.561 PAIN IN RIGHT KNEE: ICD-10-CM

## 2022-09-23 LAB
GLUCOSE BLDC GLUCOMTR-MCNC: 87 MG/DL — SIGNIFICANT CHANGE UP (ref 70–99)
GLUCOSE BLDC GLUCOMTR-MCNC: 95 MG/DL — SIGNIFICANT CHANGE UP (ref 70–99)

## 2022-09-23 PROCEDURE — 99239 HOSP IP/OBS DSCHRG MGMT >30: CPT

## 2022-09-23 RX ORDER — ALBUTEROL 90 UG/1
2 AEROSOL, METERED ORAL
Qty: 0 | Refills: 0 | DISCHARGE

## 2022-09-23 RX ORDER — PSYLLIUM SEED (WITH DEXTROSE)
0 POWDER (GRAM) ORAL
Qty: 0 | Refills: 0 | DISCHARGE

## 2022-09-23 RX ORDER — EPINEPHRINE 0.3 MG/.3ML
0 INJECTION INTRAMUSCULAR; SUBCUTANEOUS
Qty: 0 | Refills: 0 | DISCHARGE

## 2022-09-23 RX ORDER — CHLORPROMAZINE HCL 10 MG
1 TABLET ORAL
Qty: 0 | Refills: 0 | DISCHARGE

## 2022-09-23 RX ORDER — BENZOYL PEROXIDE MICRONIZED 5.8 %
1 TOWELETTE (EA) TOPICAL
Qty: 0 | Refills: 0 | DISCHARGE

## 2022-09-23 RX ORDER — LEVOTHYROXINE SODIUM 125 MCG
1 TABLET ORAL
Qty: 30 | Refills: 0
Start: 2022-09-23 | End: 2022-10-22

## 2022-09-23 RX ORDER — OLOPATADINE HYDROCHLORIDE 1 MG/ML
1 SOLUTION/ DROPS OPHTHALMIC
Qty: 0 | Refills: 0 | DISCHARGE

## 2022-09-23 RX ORDER — CYCLOBENZAPRINE HYDROCHLORIDE 10 MG/1
1 TABLET, FILM COATED ORAL
Qty: 90 | Refills: 0
Start: 2022-09-23 | End: 2022-10-22

## 2022-09-23 RX ORDER — LANOLIN/MINERAL OIL
0 LOTION (ML) TOPICAL
Qty: 0 | Refills: 0 | DISCHARGE

## 2022-09-23 RX ORDER — DIPHENHYDRAMINE HCL 50 MG
25 CAPSULE ORAL DAILY
Refills: 0 | Status: DISCONTINUED | OUTPATIENT
Start: 2022-09-23 | End: 2022-09-23

## 2022-09-23 RX ORDER — METOCLOPRAMIDE HCL 10 MG
1 TABLET ORAL
Qty: 0 | Refills: 0 | DISCHARGE

## 2022-09-23 RX ORDER — ERGOCALCIFEROL 1.25 MG/1
1 CAPSULE ORAL
Qty: 0 | Refills: 0 | DISCHARGE

## 2022-09-23 RX ORDER — CHLORPROMAZINE HCL 10 MG
1 TABLET ORAL
Qty: 120 | Refills: 0
Start: 2022-09-23 | End: 2022-10-22

## 2022-09-23 RX ORDER — METRONIDAZOLE 7.5 MG/G
1 GEL VAGINAL
Qty: 0 | Refills: 0 | DISCHARGE

## 2022-09-23 RX ADMIN — OXCARBAZEPINE 150 MILLIGRAM(S): 300 TABLET, FILM COATED ORAL at 05:28

## 2022-09-23 RX ADMIN — QUETIAPINE FUMARATE 200 MILLIGRAM(S): 200 TABLET, FILM COATED ORAL at 10:26

## 2022-09-23 RX ADMIN — Medication 1 SPRAY(S): at 05:28

## 2022-09-23 RX ADMIN — PANTOPRAZOLE SODIUM 40 MILLIGRAM(S): 20 TABLET, DELAYED RELEASE ORAL at 05:28

## 2022-09-23 RX ADMIN — Medication 1 TABLET(S): at 12:43

## 2022-09-23 RX ADMIN — Medication 25 MILLIGRAM(S): at 12:42

## 2022-09-23 RX ADMIN — CYCLOBENZAPRINE HYDROCHLORIDE 10 MILLIGRAM(S): 10 TABLET, FILM COATED ORAL at 13:51

## 2022-09-23 RX ADMIN — LORATADINE 10 MILLIGRAM(S): 10 TABLET ORAL at 12:44

## 2022-09-23 RX ADMIN — Medication 75 MICROGRAM(S): at 05:28

## 2022-09-23 RX ADMIN — Medication 50 MILLIGRAM(S): at 05:28

## 2022-09-23 RX ADMIN — CYCLOBENZAPRINE HYDROCHLORIDE 10 MILLIGRAM(S): 10 TABLET, FILM COATED ORAL at 05:28

## 2022-09-23 RX ADMIN — Medication 50 MILLIGRAM(S): at 12:42

## 2022-09-23 RX ADMIN — QUETIAPINE FUMARATE 50 MILLIGRAM(S): 200 TABLET, FILM COATED ORAL at 12:43

## 2022-09-23 RX ADMIN — APIXABAN 2.5 MILLIGRAM(S): 2.5 TABLET, FILM COATED ORAL at 05:28

## 2022-09-23 NOTE — PROGRESS NOTE ADULT - NSPROGADDITIONALINFOA_GEN_ALL_CORE
11 Am  patient observed walking around nursing station  very anxious to go back to group home , they have a bbq today that she does not want to miss     d/c to gh  d/w pa/rn/waleska blackburn  45 min to coord d/c

## 2022-09-23 NOTE — PROGRESS NOTE ADULT - SUBJECTIVE AND OBJECTIVE BOX
Patient is a 37y old  Female who presents with a chief complaint of Seizure like activity in the ED (23 Sep 2022 08:54)      SUBJECTIVE / OVERNIGHT EVENTS:  resting in bed  notes flexeril helped her pain and wants flexeril for discharge back to     MEDICATIONS  (STANDING):  apixaban 2.5 milliGRAM(s) Oral every 12 hours  chlorproMAZINE    Tablet 50 milliGRAM(s) Oral <User Schedule>  cyclobenzaprine 10 milliGRAM(s) Oral three times a day  dextrose 5%. 1000 milliLiter(s) (100 mL/Hr) IV Continuous <Continuous>  dextrose 5%. 1000 milliLiter(s) (50 mL/Hr) IV Continuous <Continuous>  dextrose 50% Injectable 25 Gram(s) IV Push once  dextrose 50% Injectable 12.5 Gram(s) IV Push once  dextrose 50% Injectable 25 Gram(s) IV Push once  fluticasone propionate 50 MICROgram(s)/spray Nasal Spray 1 Spray(s) Both Nostrils two times a day  influenza   Vaccine 0.5 milliLiter(s) IntraMuscular once  lactobacillus acidophilus 1 Tablet(s) Oral daily  levothyroxine 75 MICROGram(s) Oral daily  loratadine 10 milliGRAM(s) Oral daily  melatonin 3 milliGRAM(s) Oral at bedtime  multivitamin 1 Tablet(s) Oral daily  OXcarbazepine 150 milliGRAM(s) Oral two times a day  pantoprazole    Tablet 40 milliGRAM(s) Oral before breakfast  QUEtiapine 200 milliGRAM(s) Oral <User Schedule>  QUEtiapine 50 milliGRAM(s) Oral <User Schedule>  sodium chloride 0.9% Bolus 500 milliLiter(s) IV Bolus once  trimethoprim  160 mG/sulfamethoxazole 800 mG 1 Tablet(s) Oral daily    MEDICATIONS  (PRN):  acetaminophen     Tablet .. 650 milliGRAM(s) Oral every 6 hours PRN Severe Pain (7 - 10)  dextrose Oral Gel 15 Gram(s) Oral once PRN Blood Glucose LESS THAN 70 milliGRAM(s)/deciliter  dextrose Oral Gel 15 Gram(s) Oral once PRN Blood Glucose LESS THAN 70 milliGRAM(s)/deciliter  haloperidol    Injectable 2.5 milliGRAM(s) IV Push once PRN aggression  haloperidol    Injectable 5 milliGRAM(s) IntraMuscular every 6 hours PRN Agitation  LORazepam     Tablet 0.5 milliGRAM(s) Oral two times a day PRN Anxiety  polyethylene glycol 3350 17 Gram(s) Oral daily PRN Constipation        CAPILLARY BLOOD GLUCOSE  POCT Blood Glucose.: 87 mg/dL (23 Sep 2022 07:41)  POCT Blood Glucose.: 97 mg/dL (22 Sep 2022 22:34)  POCT Blood Glucose.: 82 mg/dL (22 Sep 2022 16:53)  POCT Blood Glucose.: 98 mg/dL (22 Sep 2022 11:26)    Vital Signs Last 24 Hrs  T(C): 36.7 (23 Sep 2022 04:49), Max: 36.8 (22 Sep 2022 21:00)  T(F): 98.1 (23 Sep 2022 04:49), Max: 98.2 (22 Sep 2022 21:00)  HR: 96 (23 Sep 2022 04:49) (80 - 109)  BP: 102/63 (23 Sep 2022 04:49) (102/63 - 112/69)  BP(mean): --  RR: 18 (23 Sep 2022 04:49) (18 - 18)  SpO2: 100% room air        PHYSICAL EXAM:  GENERAL: NAD, well-developed  HEAD:  Atraumatic, Normocephalic  EYES: EOMI, PERRLA, conjunctiva and sclera clear  NECK: Supple, No JVD  CHEST/LUNG: Clear to auscultation bilaterally; No wheeze  HEART: Regular rate and rhythm; No murmurs, rubs, or gallops  ABDOMEN: Soft, Nontender, Nondistended; Bowel sounds present  EXTREMITIES:  2+ Peripheral Pulses, No clubbing, cyanosis, or edema  PSYCH: AAOx3  NEUROLOGY: non-focal  SKIN: No rashes or lesions    LABS:                        11.4   4.36  )-----------( 185      ( 21 Sep 2022 10:07 )             35.4     09-21    133<L>  |  99  |  10  ----------------------------<  89  TNP   |  20<L>  |  0.74    Ca    9.3      21 Sep 2022 10:07    TPro  TNP  /  Alb  4.6  /  TBili  0.3  /  DBili  x   /  AST  87<H>  /  ALT  8   /  AlkPhos  40  09-21              RADIOLOGY & ADDITIONAL TESTS:    Imaging Personally Reviewed:    Consultant(s) Notes Reviewed:      Care Discussed with Consultants/Other Providers:   Patient is a 37y old  Female who presents with a chief complaint of Seizure like activity in the ED (23 Sep 2022 08:54)      SUBJECTIVE / OVERNIGHT EVENTS:  resting in bed, pain helped with Flexeril-no pain today   notes flexeril helped her pain and wants flexeril for discharge back to     MEDICATIONS  (STANDING):  apixaban 2.5 milliGRAM(s) Oral every 12 hours  chlorproMAZINE    Tablet 50 milliGRAM(s) Oral <User Schedule>  cyclobenzaprine 10 milliGRAM(s) Oral three times a day  dextrose 5%. 1000 milliLiter(s) (100 mL/Hr) IV Continuous <Continuous>  dextrose 5%. 1000 milliLiter(s) (50 mL/Hr) IV Continuous <Continuous>  dextrose 50% Injectable 25 Gram(s) IV Push once  dextrose 50% Injectable 12.5 Gram(s) IV Push once  dextrose 50% Injectable 25 Gram(s) IV Push once  fluticasone propionate 50 MICROgram(s)/spray Nasal Spray 1 Spray(s) Both Nostrils two times a day  influenza   Vaccine 0.5 milliLiter(s) IntraMuscular once  lactobacillus acidophilus 1 Tablet(s) Oral daily  levothyroxine 75 MICROGram(s) Oral daily  loratadine 10 milliGRAM(s) Oral daily  melatonin 3 milliGRAM(s) Oral at bedtime  multivitamin 1 Tablet(s) Oral daily  OXcarbazepine 150 milliGRAM(s) Oral two times a day  pantoprazole    Tablet 40 milliGRAM(s) Oral before breakfast  QUEtiapine 200 milliGRAM(s) Oral <User Schedule>  QUEtiapine 50 milliGRAM(s) Oral <User Schedule>  sodium chloride 0.9% Bolus 500 milliLiter(s) IV Bolus once  trimethoprim  160 mG/sulfamethoxazole 800 mG 1 Tablet(s) Oral daily    MEDICATIONS  (PRN):  acetaminophen     Tablet .. 650 milliGRAM(s) Oral every 6 hours PRN Severe Pain (7 - 10)  dextrose Oral Gel 15 Gram(s) Oral once PRN Blood Glucose LESS THAN 70 milliGRAM(s)/deciliter  dextrose Oral Gel 15 Gram(s) Oral once PRN Blood Glucose LESS THAN 70 milliGRAM(s)/deciliter  haloperidol    Injectable 2.5 milliGRAM(s) IV Push once PRN aggression  haloperidol    Injectable 5 milliGRAM(s) IntraMuscular every 6 hours PRN Agitation  LORazepam     Tablet 0.5 milliGRAM(s) Oral two times a day PRN Anxiety  polyethylene glycol 3350 17 Gram(s) Oral daily PRN Constipation        CAPILLARY BLOOD GLUCOSE  POCT Blood Glucose.: 87 mg/dL (23 Sep 2022 07:41)  POCT Blood Glucose.: 97 mg/dL (22 Sep 2022 22:34)  POCT Blood Glucose.: 82 mg/dL (22 Sep 2022 16:53)  POCT Blood Glucose.: 98 mg/dL (22 Sep 2022 11:26)    Vital Signs Last 24 Hrs  T(C): 36.7 (23 Sep 2022 04:49), Max: 36.8 (22 Sep 2022 21:00)  T(F): 98.1 (23 Sep 2022 04:49), Max: 98.2 (22 Sep 2022 21:00)  HR: 96 (23 Sep 2022 04:49) (80 - 109)  BP: 102/63 (23 Sep 2022 04:49) (102/63 - 112/69)  BP(mean): --  RR: 18 (23 Sep 2022 04:49) (18 - 18)  SpO2: 100% room air        PHYSICAL EXAM:  GENERAL: NAD, well-developed  HEAD:  Atraumatic, Normocephalic  EYES: EOMI, PERRLA, conjunctiva and sclera clear  NECK: Supple, No JVD  CHEST/LUNG: Clear to auscultation bilaterally; No wheeze  HEART: Regular rate and rhythm; No murmurs, rubs, or gallops  ABDOMEN: Soft, Nontender, Nondistended; Bowel sounds present  EXTREMITIES:  2+ Peripheral Pulses, No clubbing, cyanosis, or edema  PSYCH: AAOx3  NEUROLOGY: non-focal  SKIN: No rashes or lesions    LABS:                        11.4   4.36  )-----------( 185      ( 21 Sep 2022 10:07 )             35.4     09-21    133<L>  |  99  |  10  ----------------------------<  89  TNP   |  20<L>  |  0.74    Ca    9.3      21 Sep 2022 10:07    TPro  TNP  /  Alb  4.6  /  TBili  0.3  /  DBili  x   /  AST  87<H>  /  ALT  8   /  AlkPhos  40  09-21        RADIOLOGY & ADDITIONAL TESTS:  ra< from: CT Abdomen and Pelvis No Cont (09.22.22 @ 17:49) >  LIVER: Steatosis. Not completely imaged at the dome.  BILE DUCTS: Normal caliber.  GALLBLADDER: Within normal limits.  SPLEEN: Within normal limits.  PANCREAS: Within normal limits.  ADRENALS: Within normal limits.  KIDNEYS/URETERS: Within normal limits.    BLADDER: Within normal limits.  REPRODUCTIVE ORGANS: Uterus and adnexa within normal limits. Trace free   fluid in the pelvis is likely physiologic.    BOWEL: No bowel obstruction. Appendix is normal.  PERITONEUM: No ascites.  VESSELS: Within normal limits.  RETROPERITONEUM/LYMPH NODES: No lymphadenopathy.  ABDOMINAL WALL: Small fat-containing umbilical hernia.  BONES: Within normal limits.      IMPRESSION:  No acute intra-abdominal or pelvic pathology.        Care Discussed with Consultants/Other Providers:  patient /pa.rn/ sw

## 2022-09-23 NOTE — DISCHARGE NOTE NURSING/CASE MANAGEMENT/SOCIAL WORK - NSDCPEFALRISK_GEN_ALL_CORE
For information on Fall & Injury Prevention, visit: [email or web address] OR  [email or web address] OR  https://www.cdc.gov/steadi/patient.html

## 2022-09-23 NOTE — PROGRESS NOTE ADULT - PROBLEM SELECTOR PLAN 4
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 c/w Psych medications  Also Hadol / Ativan prn agitation - Patient's Latuda and Abilify recently stopped per my discussion with patients RN Fidel, and 25mg dose of Thorazine also DC'd  - she also has schizoaffective disorder   - continue home dose 50mg Thorazine QID (7a, 11a, 4pm, 8pm) with hold parameters   - continue home dose Seroquel 200mg 8am, 8pm, and 50mg 12 noon   - takes ativan 0.5mg PO BID as needed at group home, hold for the moment as patient is sleepy after haldol   - psychiatry evaluation done- no need for in patient admission. c/w Psych medications

## 2022-09-23 NOTE — PROGRESS NOTE ADULT - PROBLEM SELECTOR PLAN 2
- Patient reports mild RLQ abdominal/R pelvic pain since being in ED  - denies problems urinating or moving bowels  - UA appears to be negative.  - abdominal exam is benign and patient denies tenderness upon palpation   - will give Tylenol PRN and re-assess   - 9/21 no tenderness on exam  Can c/w tylenol and tried to return to facility 9/21- code flight called -> RN reported patient threatened to kill herself when she left due to "pain not controled"  Ct of a/p ordered - done 9/22- no acute path to explain pain  Flexeril tid  f/u pain  may need chronic pain consult - Patient reports mild RLQ abdominal/R pelvic pain since being in ED  - denies problems urinating or moving bowels  - UA appears to be negative.  - abdominal exam is benign and patient denies tenderness upon palpation   - will give Tylenol PRN and re-assess   - 9/21 no tenderness on exam- c/w tylenol and tried to return to facility   9/21- code flight called -> RN reported patient threatened to kill herself when she left due to "pain not controlled"  Ct of a/p ordered - done 9/22- no acute path to explain pain  Flexeril tid has helped her pain= NOW RESOLVED  Ready to return to group home

## 2022-09-23 NOTE — PROGRESS NOTE ADULT - PROBLEM SELECTOR PLAN 3
- Patient with hx DVT/PE  - previous dose of eliquis was 5mg PO BID, including on recent discharge from [facility] on 9/12/22  - for unclear reasons, patient placed on eliquis 2.5mg PO BID at group home  - per her group home RN, no known bleeding events recently  - continue at home dose of 2.5mg PO BID - will need further collateral and discussion on whether to increase her dose back to 5mg PO BID
- Patient with hx DVT/PE  - previous dose of eliquis was 5mg PO BID, including on recent discharge from [facility] on 9/12/22  - for unclear reasons, patient placed on eliquis 2.5mg PO BID at group home  - per her group home RN, no known bleeding events recently  - continue at home dose of 2.5mg PO BID - will need further collateral and discussion on whether to increase her dose back to 5mg PO BID
- Patient with hx DVT/PE  - previous dose of eliquis was 5mg PO BID, including on recent discharge from [facility] on 9/12/22  - for unclear reasons, patient placed on eliquis 2.5mg PO BID at group home  - per her group home RN, no known bleeding events recently  - continue at home dose of 2.5mg PO BID - will need further collateral and discussion on whether to increase her dose back to 5mg PO BID

## 2022-09-23 NOTE — PROGRESS NOTE ADULT - PROBLEM SELECTOR PLAN 6
- BP stable - on no BP meds  - continue to monitor

## 2022-09-23 NOTE — DISCHARGE NOTE NURSING/CASE MANAGEMENT/SOCIAL WORK - PATIENT PORTAL LINK FT
You can access the FollowMyHealth Patient Portal offered by [facility] by registering at the following website: [email or web address]. By joining [employer]’s FollowMyHealth portal, you will also be able to view your health information using other applications (apps) compatible with our system.

## 2022-09-23 NOTE — PROGRESS NOTE ADULT - PROBLEM SELECTOR PLAN 9
- DVT ppx: patient already anticoagulated with eliquis   - Case and plan discussed with Dr. Ruelas - DVT ppx: patient already anticoagulated with eliquis

## 2022-09-23 NOTE — PROGRESS NOTE ADULT - ASSESSMENT
38 y/o F with a PMHx of Factor V Leiden deficiency a/w DVT on Eliquis, history of seizure disorder in childhood, electrographically confirmed psychogenic nonepileptic seizures (during [facility] EMU admission 7/8-7/11/22), bipolar disorder, insomnia, asthma, diabetes, HTN, endometriosis, and hypothyroidism presents to the ED initially for body aches, right ear discomfort - now resolved with plan for discharge from ED, then with unresponsiveness followed by seizure activity while in the ED.     RLQ pain- soft -> f/u CT  R knee xray  rd< from: CT Abdomen and Pelvis No Cont (09.22.22 @ 17:49) >  INTERPRETATION:  No acute pathology in the abdomen or pelvis.  Follow-up official report.      < from: Xray Knee 1 or 2 Views, Right (09.21.22 @ 18:30) >  EXAM:  AP lateral right knee from 9/21/2022 at 1830. No similar prior studies   available for comparison.  IMPRESSION:  No fracture, dislocation, or joint effusion.  Preserved joint spaces with smooth and intact articular surfaces. No   joint margin erosions or inta-articular or periarticular calcifications.  Unremarkable quadriceps and patellar tendon shadows.  No destructive osseous lesions or periosteal reaction.  
38 y/o F with a PMHx of Factor V Leiden deficiency a/w DVT on Eliquis, history of seizure disorder in childhood, electrographically confirmed psychogenic nonepileptic seizures (during [facility] EMU admission 7/8-7/11/22), bipolar disorder, insomnia, asthma, diabetes, HTN, endometriosis, and hypothyroidism presents to the ED initially for body aches, right ear discomfort - now resolved with plan for discharge from ED, then with unresponsiveness followed by seizure activity while in the ED. 
38 y/o F with a PMHx of Factor V Leiden deficiency a/w DVT on Eliquis, history of seizure disorder in childhood, electrographically confirmed psychogenic nonepileptic seizures (during [facility] EMU admission 7/8-7/11/22), bipolar disorder, insomnia, asthma, diabetes, HTN, endometriosis, and hypothyroidism presents to the ED initially for body aches, right ear discomfort - now resolved with plan for discharge from ED, then with unresponsiveness followed by seizure activity while in the ED.     RLQ pain- soft -> f/u CT  R knee xray

## 2022-09-23 NOTE — PROGRESS NOTE ADULT - PROBLEM SELECTOR PLAN 10
Chart and Labs reviewed.  Patient is optimized to return to [facility]  No seizure- Neuro s/o  no psych in patient needed  Return to [facility] Chart and Labs reviewed.  Patient is optimized to return to   No seizure- Neuro s/o  no psych in patient needed  R knee abd abd pain improved. Patient wants Flexeril prn for d/c to NH  Return to   d/w patient/ pa/rn/SW  45 min to coord d/c

## 2022-09-23 NOTE — PROGRESS NOTE ADULT - PROBLEM SELECTOR PLAN 1
- Pt has been previously diagnosed with electrographically confirmed psychogenic nonepileptic seizures during admission at [facility] EMU (7/8-7/11/22)    - Neurology evaluation appreciated and case discussed  - as per neurology, these are likely pseudoseizures and no further work up is required from neurological stand point at this time  - Neuro exam grossly non focal   - no further hypoxia noted after seizure episode - unclear if true hypoxia at that time vs poor signal on pulse ox?  - neuro checks; fall, seizure, and aspiration precautions   - monitor on telemetry   - Per neuro; hold further administration of benzodiazepines at this time  - If patient noted to have a generalized tonic clonic seizure lasting > 3 minutes or with severe derangement of VS, [place] [phone] per neuro  - continue home trileptal 150mg PO BID  - patient refused head CT in ED, she now appears more cooperative, so will re-order head CT for further evaluation however low suspicion of acute pathology as this is a known problem in past and neuro exam non focal
- Pt has been previously diagnosed with electrographically confirmed psychogenic nonepileptic seizures during admission at [facility] EMU (7/8-7/11/22)    - Neurology evaluation appreciated and case discussed  - as per neurology, these are likely pseudoseizures and no further work up is required from neurological stand point at this time  - Neuro exam grossly non focal   - no further hypoxia noted after seizure episode - unclear if true hypoxia at that time vs poor signal on pulse ox?  - neuro checks; fall, seizure, and aspiration precautions   - monitor on telemetry   - Per neuro; hold further administration of benzodiazepines at this time  - If patient noted to have a generalized tonic clonic seizure lasting > 3 minutes or with severe derangement of VS, please [place] at [phone] per neuro  - continue home trileptal 150mg PO BID  - patient refused head CT in ED, she now appears more cooperative, so will re-order head CT for further evaluation however low suspicion of acute pathology as this is a known problem in past and neuro exam non focal
- Pt has been previously diagnosed with electrographically confirmed psychogenic nonepileptic seizures during admission at [facility] EMU (7/8-7/11/22)    - Neurology evaluation appreciated and case discussed  - as per neurology, these are likely pseudoseizures and no further work up is required from neurological stand point at this time  - Neuro exam grossly non focal   - no further hypoxia noted after seizure episode - unclear if true hypoxia at that time vs poor signal on pulse ox?  - neuro checks; fall, seizure, and aspiration precautions   - monitor on telemetry   - Per neuro; hold further administration of benzodiazepines at this time  - If patient noted to have a generalized tonic clonic seizure lasting > 3 minutes or with severe derangement of VS, please [place] at [phone] per neuro  - continue home trileptal 150mg PO BID  - patient refused head CT in ED, she now appears more cooperative, so will re-order head CT for further evaluation however low suspicion of acute pathology as this is a known problem in past and neuro exam non focal
Statement Selected

## 2022-09-23 NOTE — PROGRESS NOTE ADULT - PROBLEM SELECTOR PLAN 5
- Patient on no meds  - patient actually hypoglycemic in ED to 59, was not symptomatic per RN   - given oral glucose with improvement 64; will give amp of D50x 1; will check repeatedly until above 100  - hold sliding scale for now   - continue to monitor FS closely   - diet started   - check HgA1c - Patient on no meds  - patient actually hypoglycemic in ED t0 59   With regular diet FS are normal  - check HgA1c 5.2-

## 2022-09-26 ENCOUNTER — APPOINTMENT (OUTPATIENT)
Dept: PULMONOLOGY | Facility: CLINIC | Age: 37
End: 2022-09-26

## 2022-09-28 ENCOUNTER — APPOINTMENT (OUTPATIENT)
Dept: NEUROLOGY | Facility: CLINIC | Age: 37
End: 2022-09-28

## 2022-09-28 VITALS
DIASTOLIC BLOOD PRESSURE: 81 MMHG | SYSTOLIC BLOOD PRESSURE: 114 MMHG | HEART RATE: 96 BPM | HEIGHT: 68 IN | BODY MASS INDEX: 39.86 KG/M2 | WEIGHT: 263 LBS | RESPIRATION RATE: 16 BRPM

## 2022-09-28 PROCEDURE — 99214 OFFICE O/P EST MOD 30 MIN: CPT

## 2022-09-28 NOTE — DISCUSSION/SUMMARY
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 \par Paroxysmal anxiety / abdominal fluttering. \par Mood / bipolar disorder\par \par EMU [facility] 7/2022 results consistent with Psychogenic Nonepileptic Attacks.  Debriefed.\par No evidence for seizures/spikes interictally.\par \par Recommend AED only for migraine/mood stabilization.\par Interestingly, off VPA/TPM reports no HAs, and no mood worsening.\par No escalation of AED recommended from neurological standpoint.\par now on OXC 150mg bid\par \par f/u with psychology/psychiatry recommended\par \par repeat VEEG if needed periodically if any lack of clarity re diagnosis.\par \par Rec monitor Na levels on OXC/neuroleptics ie q3mo.  Some risk of SIADH\par \par RTC 6mo\par x time 41min

## 2022-09-28 NOTE — PHYSICAL EXAM
[FreeTextEntry1] : General: mod obese\par Constitutional: alert and in no acute distress. \par Psychiatric: affect normal, insight and judgment intact.\par Neurologic: \par Orientation: oriented to person, place, and time \par Attention: normal concentrating ability and attention was not decreased. \par Language: no difficulty naming common objects, repeating a phrase, writing a sentence; fluency, comprehension, and reading intact. \par Fund of knowledge: displays adequate knowledge of personal past history. \par Cranial Nerves: visual acuity intact bilaterally, visual fields full to confrontation, pupils equal round and reactive to light, extraocular motion intact, facial sensation intact symmetrically, face symmetrical, hearing was intact bilaterally, tongue and palate midline, head turning and shoulder shrug symmetric and there was no tongue deviation with protrusion. \par Motor: muscle tone was normal in all four extremities, muscle strength was normal in all four extremities and normal bulk in all four extremities. \par Coordination:. normal gait. balance was intact. there was no past-pointing. no tremor present. \par Deep tendon reflexes: \par Biceps right 1+. Biceps left 1+.  \par Triceps right 1+. Triceps left 1+.  \par Brachioradialis right 1+. Brachioradialis left 1+.  \par Patella right 1+. Patella left 1+.  \par Ankle jerk right 1+. Ankle jerk left 1+. \par Eyes: the sclera and conjunctiva were normal. \par Neck: the appearance of the neck was normal. \par Musculoskeletal: no clubbing or cyanosis of the fingernails. \par Skin: no lesions, rash\par R LE wrapped at knee\par

## 2022-09-28 NOTE — HISTORY OF PRESENT ILLNESS
[FreeTextEntry1] : meds-  albuterol prn, arapiprazole 30mg/d, \par levothyoxine 75mcg/d, quetiapine -, cholpromazine 50mg qid\par Eliquis 2.5mg/d, synthroid , lorazeapam 0.5 mg bid\par OXC 150mg bid started recently, \par baclofen 10mg tid, claritin prn, melatonin 3mg, \par \par Trials: Latuda 40mg/d, /1000mg.  topiramate 75 bid, stopped around 7/2022\par \par cc- ??"seizures"\par \par HPI- Previously/recently seen by Dr Byers here:\par 36 y/o RH F with bipolar d/o, factor V leiden def, born to a crack addicted mother. At age [age] was dropped and was told she suffered brain injury. Believes she was on seizure meds until age 19- not clear how well controlled.\par Age [age] believes she started having seizures. Patient believes she was essentially seizure free from age 19 until 2017 off seizure meds. In 2017 had at least one or more "seizures" and was taken to [facility].\par Has been at her present residential facility since Sept of 2021.\par \par c/o auras whenever stressed. Has a butterfly feeling in her abdomen that rises.\par In 2020 states she was doing well on Keppra but was admitted to [place] for GI discomfort and switched to VPA c/o hair loss and weight gain. Likely was switched for behavioral reasons \par \par Was admitted to [facility] twice with "seizures," once intubated and a week later for abdominal pain and a ?4 minute seizure. 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 EEG with beta but no epileptiform discharges \par Quetiapine was increased for agitation.\par \par EMU admission 7.2022: EEG /video showed episodes of  rocking, atonia, unresponsiveness - appear psychogenic nonepileptic.  valproate  -1000 bid stopped, now on OXC 150mg bid.\par \par Psychiatrist - [phone] Dr. Weiner.\par \par MRI head 3/2022 scattered WM changes\par

## 2022-09-29 ENCOUNTER — APPOINTMENT (OUTPATIENT)
Dept: INTERNAL MEDICINE | Facility: CLINIC | Age: 37
End: 2022-09-29

## 2022-09-29 ENCOUNTER — NON-APPOINTMENT (OUTPATIENT)
Age: 37
End: 2022-09-29

## 2022-09-29 VITALS
BODY MASS INDEX: 41.22 KG/M2 | OXYGEN SATURATION: 98 % | WEIGHT: 272 LBS | SYSTOLIC BLOOD PRESSURE: 112 MMHG | DIASTOLIC BLOOD PRESSURE: 74 MMHG | HEIGHT: 68 IN | RESPIRATION RATE: 16 BRPM | HEART RATE: 116 BPM

## 2022-09-29 DIAGNOSIS — Z86.718 PERSONAL HISTORY OF OTHER VENOUS THROMBOSIS AND EMBOLISM: ICD-10-CM

## 2022-09-29 DIAGNOSIS — Z83.3 FAMILY HISTORY OF DIABETES MELLITUS: ICD-10-CM

## 2022-09-29 DIAGNOSIS — Z84.89 FAMILY HISTORY OF OTHER SPECIFIED CONDITIONS: ICD-10-CM

## 2022-09-29 DIAGNOSIS — Z87.898 PERSONAL HISTORY OF OTHER SPECIFIED CONDITIONS: ICD-10-CM

## 2022-09-29 DIAGNOSIS — Z82.5 FAMILY HISTORY OF ASTHMA AND OTHER CHRONIC LOWER RESPIRATORY DISEASES: ICD-10-CM

## 2022-09-29 DIAGNOSIS — Z87.891 PERSONAL HISTORY OF NICOTINE DEPENDENCE: ICD-10-CM

## 2022-09-29 DIAGNOSIS — N80.9 ENDOMETRIOSIS, UNSPECIFIED: ICD-10-CM

## 2022-09-29 DIAGNOSIS — Z23 ENCOUNTER FOR IMMUNIZATION: ICD-10-CM

## 2022-09-29 DIAGNOSIS — Z86.711 PERSONAL HISTORY OF PULMONARY EMBOLISM: ICD-10-CM

## 2022-09-29 DIAGNOSIS — Z78.9 OTHER SPECIFIED HEALTH STATUS: ICD-10-CM

## 2022-09-29 PROCEDURE — G0008: CPT

## 2022-09-29 PROCEDURE — 99204 OFFICE O/P NEW MOD 45 MIN: CPT | Mod: 25

## 2022-09-29 PROCEDURE — 90686 IIV4 VACC NO PRSV 0.5 ML IM: CPT

## 2022-09-29 RX ORDER — L.ACID,CASEI,PLANT,SALIV/B.ANI 10B CELL
CAPSULE ORAL
Qty: 30 | Refills: 0 | Status: ACTIVE | COMMUNITY
Start: 2022-09-10

## 2022-09-29 RX ORDER — CHLORPROMAZINE HYDROCHLORIDE 25 MG/1
25 TABLET, SUGAR COATED ORAL 4 TIMES DAILY
Refills: 0 | Status: DISCONTINUED | COMMUNITY
Start: 2022-06-25 | End: 2022-09-29

## 2022-09-29 RX ORDER — MULTIVITAMIN WITH IRON
TABLET ORAL
Qty: 30 | Refills: 0 | Status: ACTIVE | COMMUNITY
Start: 2022-08-09

## 2022-09-29 NOTE — PHYSICAL EXAM
[No Acute Distress] : no acute distress [Well Nourished] : well nourished [Well Developed] : well developed [Well-Appearing] : well-appearing [Normal Voice/Communication] : normal voice/communication [Normal Sclera/Conjunctiva] : normal sclera/conjunctiva [PERRL] : pupils equal round and reactive to light [EOMI] : extraocular movements intact [Normal Outer Ear/Nose] : the outer ears and nose were normal in appearance [No JVD] : no jugular venous distention [No Lymphadenopathy] : no lymphadenopathy [Supple] : supple [Thyroid Normal, No Nodules] : the thyroid was normal and there were no nodules present [No Respiratory Distress] : no respiratory distress  [No Accessory Muscle Use] : no accessory muscle use [Clear to Auscultation] : lungs were clear to auscultation bilaterally [Normal Rate] : normal rate  [Regular Rhythm] : with a regular rhythm [Normal S1, S2] : normal S1 and S2 [No Murmur] : no murmur heard [No Carotid Bruits] : no carotid bruits [No Abdominal Bruit] : a ~M bruit was not heard ~T in the abdomen [Pedal Pulses Present] : the pedal pulses are present [No Edema] : there was no peripheral edema [No Palpable Aorta] : no palpable aorta [Soft] : abdomen soft [Non Tender] : non-tender [Non-distended] : non-distended [No Masses] : no abdominal mass palpated [No HSM] : no HSM [Normal Bowel Sounds] : normal bowel sounds [Normal Posterior Cervical Nodes] : no posterior cervical lymphadenopathy [Normal Anterior Cervical Nodes] : no anterior cervical lymphadenopathy [No CVA Tenderness] : no CVA  tenderness [No Spinal Tenderness] : no spinal tenderness [No Joint Swelling] : no joint swelling [Grossly Normal Strength/Tone] : grossly normal strength/tone [Coordination Grossly Intact] : coordination grossly intact [No Focal Deficits] : no focal deficits [Normal Gait] : normal gait [Speech Grossly Normal] : speech grossly normal [Memory Grossly Normal] : memory grossly normal [Normal Affect] : the affect was normal [Alert and Oriented x3] : oriented to person, place, and time [Normal Mood] : the mood was normal [Normal Insight/Judgement] : insight and judgment were intact [de-identified] : obese [de-identified] : anterolateral rt knee tenderness.  + pain with mcmurrays, no pain with vallus/valgus stress, neg draw signs.

## 2022-09-29 NOTE — HISTORY OF PRESENT ILLNESS
[Other: _____] : [unfilled] [FreeTextEntry1] : mult issues.   [de-identified] : 36 y/o female, resident of alf, with a hx of factor V leiden deficiency, DVT, hypothyroid, high BP, ssz d/o with hx psychogenic sz, asthma, bipolar d/o, schizoaffective d/o, endometriosis here to establish care.\par s/p recent admission to [facility] with pseudoseizures, knee pains, pelvic pains from 9/20/22 - 9/23/22.  \par following with neurology and psych.  \par Has been on psych meds\par s/p recent f/u with vascular, cardiology, allergy, ID\par Available notes reviewed.  \par Reviewed hospital chart over HIE.  \par saw hematology this morning.  labs were sent.  Was put back on the MVI/iron.  \par Has been having knee pains - went to ER at [facility] earlier this week.  Reprots that the films were nl.  Reports that the pain is at the anterolateral knee.  constant.  worse with change in the weather and cold weather.  no noted erythema, bruising.  has been having since 2016.  ? worsening.  \par some numbness and swelling at the hands.  \par + pelvic pains as noted.  \par labs in the hospital - WBC 4.36, h/h 11.4/35.4, , cr 0.74, A1c 5.2\par Radiology - Ct a/p - nl.  Ct head nl.  Knee x-ray nl.\par \par gyn - following - to go for transvaginal pelvic sono\par follows with ophtho\par follows with dental.  \par \par flu vaccine - to get. Azithromycin Counseling:  I discussed with the patient the risks of azithromycin including but not limited to GI upset, allergic reaction, drug rash, diarrhea, and yeast infections.

## 2022-09-29 NOTE — HEALTH RISK ASSESSMENT
[Former] : Former [1 or 2 (0 pts)] : 1 or 2 (0 points) [Never (0 pts)] : Never (0 points) [No] : In the past 12 months have you used drugs other than those required for medical reasons? No [No falls in past year] : Patient reported no falls in the past year [Other reason not done] : Other reason not done [I have developed a follow-up plan documented below in the note.] : I have developed a follow-up plan documented below in the note. [Patient reported PAP Smear was normal] : Patient reported PAP Smear was normal [Unemployed] : unemployed [Single] : single [Feels Safe at Home] : Feels safe at home [de-identified] : former [Audit-CScore] : 0 [de-identified] : with hx bipolar and schizoeffective d/o.  Following with psych. [EyeExamDate] : 7/22 [PapSmearDate] : 9/22 [de-identified] : group home

## 2022-09-29 NOTE — REVIEW OF SYSTEMS
[Joint Pain] : joint pain [Headache] : headache [Negative] : Heme/Lymph [Joint Stiffness] : no joint stiffness [Joint Swelling] : no joint swelling [Muscle Weakness] : no muscle weakness [Muscle Pain] : no muscle pain [Back Pain] : no back pain [de-identified] : hx sz and headaches.  some numbness at the hands [de-identified] : as per HPI

## 2022-09-30 ENCOUNTER — EMERGENCY (EMERGENCY)
Facility: HOSPITAL | Age: 37
LOS: 0 days | Discharge: HOME | End: 2022-09-30
Attending: EMERGENCY MEDICINE

## 2022-09-30 VITALS
SYSTOLIC BLOOD PRESSURE: 158 MMHG | RESPIRATION RATE: 18 BRPM | TEMPERATURE: 98 F | WEIGHT: 261.91 LBS | DIASTOLIC BLOOD PRESSURE: 81 MMHG | HEIGHT: 68 IN | OXYGEN SATURATION: 100 % | HEART RATE: 102 BPM

## 2022-09-30 VITALS
DIASTOLIC BLOOD PRESSURE: 82 MMHG | OXYGEN SATURATION: 100 % | RESPIRATION RATE: 18 BRPM | TEMPERATURE: 98 F | SYSTOLIC BLOOD PRESSURE: 117 MMHG | HEART RATE: 101 BPM

## 2022-09-30 DIAGNOSIS — Z20.822 CONTACT WITH AND (SUSPECTED) EXPOSURE TO COVID-19: ICD-10-CM

## 2022-09-30 DIAGNOSIS — R10.31 RIGHT LOWER QUADRANT PAIN: ICD-10-CM

## 2022-09-30 DIAGNOSIS — K21.9 GASTRO-ESOPHAGEAL REFLUX DISEASE WITHOUT ESOPHAGITIS: ICD-10-CM

## 2022-09-30 DIAGNOSIS — R19.7 DIARRHEA, UNSPECIFIED: ICD-10-CM

## 2022-09-30 DIAGNOSIS — E03.9 HYPOTHYROIDISM, UNSPECIFIED: ICD-10-CM

## 2022-09-30 DIAGNOSIS — Z79.01 LONG TERM (CURRENT) USE OF ANTICOAGULANTS: ICD-10-CM

## 2022-09-30 DIAGNOSIS — Z88.5 ALLERGY STATUS TO NARCOTIC AGENT: ICD-10-CM

## 2022-09-30 DIAGNOSIS — F31.9 BIPOLAR DISORDER, UNSPECIFIED: ICD-10-CM

## 2022-09-30 DIAGNOSIS — Z88.0 ALLERGY STATUS TO PENICILLIN: ICD-10-CM

## 2022-09-30 DIAGNOSIS — Z88.8 ALLERGY STATUS TO OTHER DRUGS, MEDICAMENTS AND BIOLOGICAL SUBSTANCES STATUS: ICD-10-CM

## 2022-09-30 DIAGNOSIS — Z91.040 LATEX ALLERGY STATUS: ICD-10-CM

## 2022-09-30 DIAGNOSIS — E11.9 TYPE 2 DIABETES MELLITUS WITHOUT COMPLICATIONS: ICD-10-CM

## 2022-09-30 DIAGNOSIS — N80.9 ENDOMETRIOSIS, UNSPECIFIED: ICD-10-CM

## 2022-09-30 DIAGNOSIS — I10 ESSENTIAL (PRIMARY) HYPERTENSION: ICD-10-CM

## 2022-09-30 DIAGNOSIS — Z86.718 PERSONAL HISTORY OF OTHER VENOUS THROMBOSIS AND EMBOLISM: ICD-10-CM

## 2022-09-30 DIAGNOSIS — R11.2 NAUSEA WITH VOMITING, UNSPECIFIED: ICD-10-CM

## 2022-09-30 DIAGNOSIS — J45.909 UNSPECIFIED ASTHMA, UNCOMPLICATED: ICD-10-CM

## 2022-09-30 LAB
ALBUMIN SERPL ELPH-MCNC: 3.5 G/DL — SIGNIFICANT CHANGE UP (ref 3.3–5)
ALP SERPL-CCNC: 50 U/L — SIGNIFICANT CHANGE UP (ref 40–120)
ALT FLD-CCNC: 28 U/L — SIGNIFICANT CHANGE UP (ref 12–78)
ANION GAP SERPL CALC-SCNC: 7 MMOL/L — SIGNIFICANT CHANGE UP (ref 5–17)
APPEARANCE UR: CLEAR — SIGNIFICANT CHANGE UP
AST SERPL-CCNC: 11 U/L — LOW (ref 15–37)
BACTERIA # UR AUTO: ABNORMAL
BASOPHILS # BLD AUTO: 0.02 K/UL — SIGNIFICANT CHANGE UP (ref 0–0.2)
BASOPHILS NFR BLD AUTO: 0.3 % — SIGNIFICANT CHANGE UP (ref 0–2)
BILIRUB SERPL-MCNC: 0.1 MG/DL — LOW (ref 0.2–1.2)
BILIRUB UR-MCNC: NEGATIVE — SIGNIFICANT CHANGE UP
BUN SERPL-MCNC: 8 MG/DL — SIGNIFICANT CHANGE UP (ref 7–23)
CALCIUM SERPL-MCNC: 9 MG/DL — SIGNIFICANT CHANGE UP (ref 8.5–10.1)
CHLORIDE SERPL-SCNC: 107 MMOL/L — SIGNIFICANT CHANGE UP (ref 96–108)
CO2 SERPL-SCNC: 26 MMOL/L — SIGNIFICANT CHANGE UP (ref 22–31)
COLOR SPEC: YELLOW — SIGNIFICANT CHANGE UP
CREAT SERPL-MCNC: 0.82 MG/DL — SIGNIFICANT CHANGE UP (ref 0.5–1.3)
DIFF PNL FLD: ABNORMAL
EGFR: 96 ML/MIN/1.73M2 — SIGNIFICANT CHANGE UP
EOSINOPHIL # BLD AUTO: 0.01 K/UL — SIGNIFICANT CHANGE UP (ref 0–0.5)
EOSINOPHIL NFR BLD AUTO: 0.1 % — SIGNIFICANT CHANGE UP (ref 0–6)
EPI CELLS # UR: ABNORMAL
FLUAV AG NPH QL: SIGNIFICANT CHANGE UP
FLUBV AG NPH QL: SIGNIFICANT CHANGE UP
GLUCOSE SERPL-MCNC: 91 MG/DL — SIGNIFICANT CHANGE UP (ref 70–99)
GLUCOSE UR QL: NEGATIVE MG/DL — SIGNIFICANT CHANGE UP
HCG SERPL-ACNC: <1 MIU/ML — SIGNIFICANT CHANGE UP
HCT VFR BLD CALC: 32.8 % — LOW (ref 34.5–45)
HGB BLD-MCNC: 10.5 G/DL — LOW (ref 11.5–15.5)
IMM GRANULOCYTES NFR BLD AUTO: 0.1 % — SIGNIFICANT CHANGE UP (ref 0–0.9)
KETONES UR-MCNC: ABNORMAL
LACTATE SERPL-SCNC: 0.9 MMOL/L — SIGNIFICANT CHANGE UP (ref 0.7–2)
LEUKOCYTE ESTERASE UR-ACNC: ABNORMAL
LIDOCAIN IGE QN: 72 U/L — LOW (ref 73–393)
LYMPHOCYTES # BLD AUTO: 1.62 K/UL — SIGNIFICANT CHANGE UP (ref 1–3.3)
LYMPHOCYTES # BLD AUTO: 24.2 % — SIGNIFICANT CHANGE UP (ref 13–44)
MCHC RBC-ENTMCNC: 29.4 PG — SIGNIFICANT CHANGE UP (ref 27–34)
MCHC RBC-ENTMCNC: 32 G/DL — SIGNIFICANT CHANGE UP (ref 32–36)
MCV RBC AUTO: 91.9 FL — SIGNIFICANT CHANGE UP (ref 80–100)
MONOCYTES # BLD AUTO: 0.39 K/UL — SIGNIFICANT CHANGE UP (ref 0–0.9)
MONOCYTES NFR BLD AUTO: 5.8 % — SIGNIFICANT CHANGE UP (ref 2–14)
NEUTROPHILS # BLD AUTO: 4.65 K/UL — SIGNIFICANT CHANGE UP (ref 1.8–7.4)
NEUTROPHILS NFR BLD AUTO: 69.5 % — SIGNIFICANT CHANGE UP (ref 43–77)
NITRITE UR-MCNC: NEGATIVE — SIGNIFICANT CHANGE UP
NRBC # BLD: 0 /100 WBCS — SIGNIFICANT CHANGE UP (ref 0–0)
PH UR: 6 — SIGNIFICANT CHANGE UP (ref 5–8)
PLATELET # BLD AUTO: 152 K/UL — SIGNIFICANT CHANGE UP (ref 150–400)
POTASSIUM SERPL-MCNC: 4.1 MMOL/L — SIGNIFICANT CHANGE UP (ref 3.5–5.3)
POTASSIUM SERPL-SCNC: 4.1 MMOL/L — SIGNIFICANT CHANGE UP (ref 3.5–5.3)
PROT SERPL-MCNC: 7.3 GM/DL — SIGNIFICANT CHANGE UP (ref 6–8.3)
PROT UR-MCNC: 30 MG/DL
RBC # BLD: 3.57 M/UL — LOW (ref 3.8–5.2)
RBC # FLD: 14 % — SIGNIFICANT CHANGE UP (ref 10.3–14.5)
RBC CASTS # UR COMP ASSIST: SIGNIFICANT CHANGE UP /HPF (ref 0–4)
SARS-COV-2 RNA SPEC QL NAA+PROBE: SIGNIFICANT CHANGE UP
SODIUM SERPL-SCNC: 140 MMOL/L — SIGNIFICANT CHANGE UP (ref 135–145)
SP GR SPEC: 1.02 — SIGNIFICANT CHANGE UP (ref 1.01–1.02)
UROBILINOGEN FLD QL: NEGATIVE MG/DL — SIGNIFICANT CHANGE UP
WBC # BLD: 6.65 K/UL — SIGNIFICANT CHANGE UP (ref 3.8–10.5)
WBC # FLD AUTO: 6.65 K/UL — SIGNIFICANT CHANGE UP (ref 3.8–10.5)
WBC UR QL: SIGNIFICANT CHANGE UP

## 2022-09-30 PROCEDURE — 74177 CT ABD & PELVIS W/CONTRAST: CPT | Mod: 26,MA

## 2022-09-30 PROCEDURE — 99285 EMERGENCY DEPT VISIT HI MDM: CPT

## 2022-09-30 RX ORDER — METOCLOPRAMIDE HCL 10 MG
10 TABLET ORAL ONCE
Refills: 0 | Status: COMPLETED | OUTPATIENT
Start: 2022-09-30 | End: 2022-09-30

## 2022-09-30 RX ORDER — DIPHENHYDRAMINE HCL 50 MG
25 CAPSULE ORAL ONCE
Refills: 0 | Status: COMPLETED | OUTPATIENT
Start: 2022-09-30 | End: 2022-09-30

## 2022-09-30 RX ORDER — MORPHINE SULFATE 50 MG/1
4 CAPSULE, EXTENDED RELEASE ORAL ONCE
Refills: 0 | Status: DISCONTINUED | OUTPATIENT
Start: 2022-09-30 | End: 2022-09-30

## 2022-09-30 RX ORDER — OXYCODONE AND ACETAMINOPHEN 5; 325 MG/1; MG/1
1 TABLET ORAL ONCE
Refills: 0 | Status: DISCONTINUED | OUTPATIENT
Start: 2022-09-30 | End: 2022-09-30

## 2022-09-30 RX ADMIN — Medication 25 MILLIGRAM(S): at 05:07

## 2022-09-30 RX ADMIN — Medication 1 MILLIGRAM(S): at 08:25

## 2022-09-30 RX ADMIN — Medication 25 MILLIGRAM(S): at 03:14

## 2022-09-30 RX ADMIN — OXYCODONE AND ACETAMINOPHEN 1 TABLET(S): 5; 325 TABLET ORAL at 08:23

## 2022-09-30 RX ADMIN — MORPHINE SULFATE 4 MILLIGRAM(S): 50 CAPSULE, EXTENDED RELEASE ORAL at 03:53

## 2022-09-30 RX ADMIN — Medication 10 MILLIGRAM(S): at 03:14

## 2022-09-30 NOTE — ED PROVIDER NOTE - PATIENT PORTAL LINK FT
You can access the FollowMyHealth Patient Portal offered by [facility] by registering at the following website: [email or web address]. By joining [employer]’s FollowMyHealth portal, you will also be able to view your health information using other applications (apps) compatible with our system.

## 2022-09-30 NOTE — ED ADULT NURSE NOTE - OBJECTIVE STATEMENT
pt arrives by EMS, NAD, ABCs intact, A&O x 4. Per pt, right lower quad pain intermittently radiating to generalized abdomen since yesterday accompanied by n/v, denies fever.

## 2022-09-30 NOTE — ED ADULT NURSE REASSESSMENT NOTE - NS ED NURSE REASSESS COMMENT FT1
pt noted to be walking down the hallway. MD had conversation with pt regarding destination, pt stated that she wanted to get away because she felt as if she was being talked about. Shortly thereafter, pt returned to her room.

## 2022-09-30 NOTE — ED PROVIDER NOTE - CLINICAL SUMMARY MEDICAL DECISION MAKING FREE TEXT BOX
abd pain otherwise with nausea/vomiting and diarrhea, tolerating PO in ED - otherwise CT negative for pathology - will dc back to home. States already takes reglan at home and will continue it.

## 2022-09-30 NOTE — ED PROVIDER NOTE - NSFOLLOWUPINSTRUCTIONS_ED_ALL_ED_FT
Gastroenteritis    WHAT YOU NEED TO KNOW:    Gastroenteritis, or stomach flu, is an infection of the stomach and intestines.   Digestive Tract         DISCHARGE INSTRUCTIONS:    Call 911 for any of the following:   •You have trouble breathing or a very fast pulse.          Return to the emergency department if:   •You see blood in your diarrhea.      •You cannot stop vomiting.      •You have not urinated for 12 hours.       •You feel like you are going to faint.      Contact your healthcare provider if:   •You have a fever.      •You continue to vomit or have diarrhea, even after treatment.      •You see worms in your diarrhea.      •Your mouth or eyes are dry. You are not urinating as much or as often.      •You have questions or concerns about your condition or care.      Medicines:   •Medicines may be given to stop vomiting or diarrhea, decrease abdominal cramps, or treat an infection.      •Take your medicine as directed. Contact your healthcare provider if you think your medicine is not helping or if you have side effects. Tell your provider if you are allergic to any medicine. Keep a list of the medicines, vitamins, and herbs you take. Include the amounts, and when and why you take them. Bring the list or the pill bottles to follow-up visits. Carry your medicine list with you in case of an emergency.      Manage your symptoms:   •Drink liquids as directed. Ask your healthcare provider how much liquid to drink each day, and which liquids are best for you. You may also need to drink an oral rehydration solution (ORS). An ORS has the right amounts of sugar, salt, and minerals in water to replace body fluids.      •Eat bland foods. When you feel hungry, begin eating soft, bland foods. Examples are bananas, clear soup, potatoes, and applesauce. Do not have dairy products, alcohol, sugary drinks, or drinks with caffeine until you feel better.      •Rest as much as possible. Slowly start to do more each day when you begin to feel better.      Prevent the spread of gastroenteritis: Gastroenteritis can spread easily. Keep yourself, your family, and your surroundings clean to help prevent the spread of gastroenteritis:   •Wash your hands often. Use soap and water. Wash your hands after you use the bathroom, change a child's diapers, or sneeze. Wash your hands before you prepare or eat food.   Handwashing           •Clean surfaces and do laundry often. Wash your clothes and towels separately from the rest of the laundry. Clean surfaces in your home with antibacterial  or bleach.      •Clean food thoroughly and cook safely. Wash raw vegetables before you cook. Cook meat, fish, and eggs fully. Do not use the same dishes for raw meat as you do for other foods. Refrigerate any leftover food immediately.      •Be aware when you camp or travel. Drink only clean water. Do not drink from rivers or lakes unless you purify or boil the water first. When you travel, drink bottled water and do not add ice. Do not eat fruit that has not been peeled. Do not eat raw fish or meat that is not fully cooked.       Follow up with your doctor as directed: Write down your questions so you remember to ask them during your visits.

## 2022-09-30 NOTE — ED ADULT NURSE REASSESSMENT NOTE - NS ED NURSE REASSESS COMMENT FT1
pt up for DC, noted to have pulled out her IV and was standing in doorway bleeding, stating that she didn't want the IV anymore. Dressing placed. Pt aware that ambulette transport is being coordinated.

## 2022-09-30 NOTE — ED ADULT NURSE REASSESSMENT NOTE - NS ED NURSE REASSESS COMMENT FT1
pt standing at door with door shut and noted to be striking her head against the door. Pt noted to be holding needle in her hand. Code grey called.

## 2022-09-30 NOTE — ED ADULT NURSE REASSESSMENT NOTE - NS ED NURSE REASSESS COMMENT FT1
Received patient discharged A&OX3, breathing even and unlabored on room air, no acute respiratory distress noted.  Patient pending transportation

## 2022-09-30 NOTE — ED ADULT NURSE REASSESSMENT NOTE - NS ED NURSE REASSESS COMMENT FT1
pt made aware of ambulance ETA (60-90 mins) for return to group home. Shortly thereafter, pt noted to be standing in doorway of room. This RN approached pt to see if she needed anything, pt noted to be standing there with an unused IV (needle exposed) and staring at me. I asked pt to place the IV in the sharp's container, pt continued to stare at me while reaching for sharp's and acted as if she dropped needle into container. I asked pt to return to [facility]. MD made aware.

## 2022-09-30 NOTE — ED PROVIDER NOTE - OBJECTIVE STATEMENT
37 year old female with PMH of 37 year old female with PMH of asthma, bipolar, endometriosis, Factor V Leiden, GERD, HTN, Hypothyroid, seizure hx with multiple visits to ED for abd pain in past but tonight states feeling abd pain with nausea/vomiting and diarrhea and otherwise pain is present in RLQ and persistent.

## 2022-09-30 NOTE — ED PROVIDER NOTE - CARE PROVIDER_API CALL
Debi Giraldo)  Gastroenterology; Internal Medicine  [address] [place] [address]  Phone: [phone]  Fax: [phone]  Follow Up Time: 1-3 Days

## 2022-10-01 LAB
CULTURE RESULTS: SIGNIFICANT CHANGE UP
SPECIMEN SOURCE: SIGNIFICANT CHANGE UP

## 2022-10-02 ENCOUNTER — EMERGENCY (EMERGENCY)
Facility: HOSPITAL | Age: 37
LOS: 1 days | Discharge: HOME | End: 2022-10-02
Attending: EMERGENCY MEDICINE | Admitting: EMERGENCY MEDICINE

## 2022-10-02 VITALS
TEMPERATURE: 97 F | RESPIRATION RATE: 18 BRPM | DIASTOLIC BLOOD PRESSURE: 63 MMHG | OXYGEN SATURATION: 98 % | SYSTOLIC BLOOD PRESSURE: 101 MMHG | HEART RATE: 87 BPM

## 2022-10-02 VITALS
OXYGEN SATURATION: 100 % | TEMPERATURE: 98 F | RESPIRATION RATE: 18 BRPM | DIASTOLIC BLOOD PRESSURE: 82 MMHG | HEART RATE: 62 BPM | HEIGHT: 68 IN | SYSTOLIC BLOOD PRESSURE: 119 MMHG

## 2022-10-02 PROCEDURE — 99284 EMERGENCY DEPT VISIT MOD MDM: CPT

## 2022-10-02 PROCEDURE — 70450 CT HEAD/BRAIN W/O DYE: CPT | Mod: 26,MA

## 2022-10-02 RX ORDER — ACETAMINOPHEN 500 MG
650 TABLET ORAL ONCE
Refills: 0 | Status: DISCONTINUED | OUTPATIENT
Start: 2022-10-02 | End: 2022-10-02

## 2022-10-02 RX ORDER — ACETAMINOPHEN 500 MG
975 TABLET ORAL ONCE
Refills: 0 | Status: COMPLETED | OUTPATIENT
Start: 2022-10-02 | End: 2022-10-02

## 2022-10-02 RX ORDER — MECLIZINE HCL 12.5 MG
50 TABLET ORAL ONCE
Refills: 0 | Status: COMPLETED | OUTPATIENT
Start: 2022-10-02 | End: 2022-10-02

## 2022-10-02 RX ADMIN — Medication 975 MILLIGRAM(S): at 03:05

## 2022-10-02 RX ADMIN — Medication 50 MILLIGRAM(S): at 04:05

## 2022-10-02 RX ADMIN — Medication 975 MILLIGRAM(S): at 02:07

## 2022-10-02 NOTE — ED PROVIDER NOTE - OBJECTIVE STATEMENT
38 yo F with PMH of asthma, factor V leiden on eliquis presenting with complaint of head trauma after striking her head 10 hours ago on her bedside table. No LOC but patient has been experiencing some changes in her balance and blurry vision. Pt denies weakness, parethesias, fever, cough, vomiting. Pt feels nauseous but hasn't vomited.

## 2022-10-02 NOTE — ED PROVIDER NOTE - CLINICAL SUMMARY MEDICAL DECISION MAKING FREE TEXT BOX
36 yo F with PMH of asthma, factor V leiden on eliquis presenting with complaint of head trauma after striking her head 10 hours ago on her bedside table. Differential diagnosis includes but is not limited to head contusion, concussion, ICH. Would get CTH given pt is on eliquis.

## 2022-10-02 NOTE — ED PROVIDER NOTE - PATIENT PORTAL LINK FT
You can access the FollowMyHealth Patient Portal offered by [facility] by registering at the following website: [email or web address]. By joining [employer]s FollowMyHealth portal, you will also be able to view your health information using other applications (apps) compatible with our system.

## 2022-10-02 NOTE — ED PROVIDER NOTE - PROGRESS NOTE DETAILS
Melvi Wells, PGY-2, EM: pt reassessed. Discussed negative CT results. Pt able to walk independently. Dizziness improved.

## 2022-10-02 NOTE — ED ADULT NURSE REASSESSMENT NOTE - NS ED NURSE REASSESS COMMENT FT1
received report from break rn. pt resting comfortably, offers no complaints. VSS. RR even and unlabored. Awaiting further orders from provider.

## 2022-10-02 NOTE — ED ADULT NURSE NOTE - OBJECTIVE STATEMENT
Break coverage- Pt received into spot #20. AAOx4, c/o headache after hitting her head on the bedside table earlier today. Denies LOC. Pt is on Eliquis. No signs of injury. Denies n/v. MD pitts performed. no acute distress. Awaiting CT scan and further plan of care.

## 2022-10-02 NOTE — ED PROVIDER NOTE - NS ED MD DISPO DISCHARGE
If you receive a survey via e-mail or mail, please take the time to complete and return as your feedback is very helpful to our practice.     ########################################    If your neurological testing is not going to be scheduled today our Pre-Service Department will contact you Zay schedule within one to two days. If you would like to call and schedule when it is convenient for you or if you need to reschedule your appointment please call the Pre-Service Department at [phone].    Your tests will be reviewed by the Benefits Department and if there are any concerns regarding prior authorization or financial obligation they will contact you. We recommend you still contact your insurance company to see if the test, provider, and location of service are covered, and if so, will there be any out of pocket expenses.     If you should have any concerns regarding the financial obligation of the tests please contact our Financial Advocates at [phone] and they will be happy to assist you.                 Thank you for letting us care for you today.       #######################################    In order to make sure we are meeting our patients' needs, we require a 36 hour notice from you prior to picking up your medications. Attached is a table that will help you determine when your prescriptions will be ready for pick-up.                      If the refill is requested between when the clinic opens and 12 pm on  You can  your prescription at the pharmacy after 6 PM on   Monday Tuesday Tuesday Wednesday Wednesday Thursday Thursday Friday Friday Monday     If the refill is requested between 12 pm and after the clinic closes on You can  your prescription at the pharmacy after 12 PM on   Monday Wednesday Tuesday Thursday Wednesday Friday Thursday Monday Friday Tuesday       
Home

## 2022-10-02 NOTE — PROVIDER CONTACT NOTE (OTHER) - ACTION/TREATMENT ORDERED:
Contacted [facility] ([phone]), spoke with Garry.  Set up ambulance transport back to [facility], ETA 8AM.

## 2022-10-02 NOTE — ED PROVIDER NOTE - PHYSICAL EXAMINATION
General: WN/WD NAD  Head: Atraumatic, normocephalic  Eyes: EOM grossly in tact, no scleral icterus, no discharge  ENT: moist mucous membranes  Neurology: A&Ox 3, PERRL, CN grossly intact. 5/5 strength and normal sensation throughout all four extremities.  no facial asymmetry.   Respiratory: normal respiratory effort  CV: Extremities warm and well perfused  Abdominal: Soft, non-distended, non-tender, no masses  Extremities: No edema, no deformities  Skin: warm and dry. No rashes

## 2022-10-02 NOTE — ED ADULT NURSE REASSESSMENT NOTE - NS ED NURSE REASSESS COMMENT FT1
Patient was discharged via stretcher to  her residence group home with [facility] EMT.

## 2022-10-02 NOTE — ED PROVIDER NOTE - ATTENDING CONTRIBUTION TO CARE
38yo F from group home facility with PMHx of asthma, Bipolar, seizures, factor V leiden on eliquis, p/w frontal headache and positional dizziness after blunt head trauma tonight.  Says she bent down to pick something up and hit head on nighttable.  No loc, falls, n/v, focal weakness or numbness.      General: Patient alert in no apparent distress  Skin: Dry and intact  HEENT: Head atraumatic. Oral mucosa moist.   Eyes: Conjunctiva normal. eomi, no nystagmus  Cardiac: Regular rhythm and rate. No pretibial edema b/l  Respiratory: Lungs clear b/l and symmetric. No respiratory distress. Able to speak in complete sentences.  Gastrointestinal: Abdomen soft, nondistended, nontender  Musculoskeletal: Moves all extremities spontaneously  Neurological: alert and oriented to person, place, and time. CN 2-12 grossly intact. No pronator drift. Motor strength 5/5 in all distal extremities.   Psychiatric: Calm and cooperative    a/p  concern for ICH given h/o AC use with head trauma  dizziness nonspecific but will trial meclizine as positional aspect

## 2022-10-02 NOTE — PROVIDER CONTACT NOTE (OTHER) - BACKGROUND
Contacted patient residence, [facility] ([phone]), spoke with Peter, confirmed address and that patient travels via AMBULANCE.

## 2022-10-02 NOTE — ED ADULT TRIAGE NOTE - CHIEF COMPLAINT QUOTE
BIBEMS c/o hitting head on night stand. -LOC, -cuts/abrasions, -AC use. Endorses dizziness. HX schizophrenia/bipolar disorder, hypothyroidism, Seizures, asthma, HTN,

## 2022-10-06 NOTE — ED ADULT NURSE NOTE - NSHOSCREENINGQ1_ED_ALL_ED
Subjective:  28 year old  at 39w2d  No ctx, lof, bleeding, or dc.  No HA or vision changes.    Outpatient Medications Prior to Visit   Medication Sig Dispense Refill     ferrous gluconate (FERGON) 324 (38 Fe) MG tablet Take 1 tablet (324 mg) by mouth daily (with breakfast) 90 tablet 0     Prenatal MV-Min-Fe Fum-FA-DHA (PRENATAL MULTIVITAMIN PLUS DHA) 27-0.8-250 MG CAPS Take 1 tablet by mouth daily OK to substitute formulary equivalent 100 capsule 3     No facility-administered medications prior to visit.      History   Smoking Status     Never Smoker   Smokeless Tobacco     Never Used      Objective:  /59 (BP Location: Left arm, Patient Position: Sitting, Cuff Size: Adult Regular)   Pulse 81   Temp 98.2  F (36.8  C) (Temporal)   Resp 20   Wt 87.3 kg (192 lb 8 oz)   LMP 2022   BMI 38.30 kg/m    GENERAL: alert, not distressed  CHEST: clear, no rales, rhonchi, or wheezes  CARDIAC: regular without murmur, gallop, or rub  ABDOMEN: gravid, soft, non tender, non distended    No results found for this or any previous visit (from the past 24 hour(s)).   Assessment and Plan:   1. Encounter for supervision of other normal pregnancy in third trimester  IOL discussed.  Cervix not favorable.  My recommendation was expectant management.  She was ok with this approach.  Other deliveries at 40w 5d, 41w 2d, 41w 5d.  Consider ripening at 40 weeks if not favorable.    Return in 1 week (on 10/13/2022) for prenatal care.   Future Appointments   Date Time Provider Department Center   10/13/2022 12:40 PM Jv Bass MD [facility]        
No

## 2022-10-09 ENCOUNTER — EMERGENCY (EMERGENCY)
Facility: HOSPITAL | Age: 37
LOS: 1 days | Discharge: HOME | End: 2022-10-09
Attending: EMERGENCY MEDICINE | Admitting: EMERGENCY MEDICINE

## 2022-10-09 VITALS
OXYGEN SATURATION: 100 % | SYSTOLIC BLOOD PRESSURE: 106 MMHG | DIASTOLIC BLOOD PRESSURE: 83 MMHG | HEART RATE: 105 BPM | HEIGHT: 68 IN | RESPIRATION RATE: 16 BRPM | TEMPERATURE: 99 F

## 2022-10-09 PROCEDURE — 99284 EMERGENCY DEPT VISIT MOD MDM: CPT

## 2022-10-09 RX ORDER — ACETAMINOPHEN 500 MG
975 TABLET ORAL ONCE
Refills: 0 | Status: COMPLETED | OUTPATIENT
Start: 2022-10-09 | End: 2022-10-09

## 2022-10-09 RX ORDER — LIDOCAINE 4 G/100G
1 CREAM TOPICAL ONCE
Refills: 0 | Status: COMPLETED | OUTPATIENT
Start: 2022-10-09 | End: 2022-10-09

## 2022-10-10 ENCOUNTER — NON-APPOINTMENT (OUTPATIENT)
Age: 37
End: 2022-10-10

## 2022-10-10 VITALS
TEMPERATURE: 98 F | OXYGEN SATURATION: 100 % | RESPIRATION RATE: 18 BRPM | SYSTOLIC BLOOD PRESSURE: 111 MMHG | DIASTOLIC BLOOD PRESSURE: 61 MMHG | HEART RATE: 100 BPM

## 2022-10-10 LAB
APPEARANCE UR: ABNORMAL
BACTERIA # UR AUTO: ABNORMAL
BILIRUB UR-MCNC: NEGATIVE — SIGNIFICANT CHANGE UP
COLOR SPEC: YELLOW — SIGNIFICANT CHANGE UP
DIFF PNL FLD: NEGATIVE — SIGNIFICANT CHANGE UP
EPI CELLS # UR: 13 /HPF — HIGH (ref 0–5)
GLUCOSE UR QL: NEGATIVE — SIGNIFICANT CHANGE UP
HYALINE CASTS # UR AUTO: 1 /LPF — SIGNIFICANT CHANGE UP (ref 0–7)
KETONES UR-MCNC: ABNORMAL
LEUKOCYTE ESTERASE UR-ACNC: NEGATIVE — SIGNIFICANT CHANGE UP
NITRITE UR-MCNC: NEGATIVE — SIGNIFICANT CHANGE UP
PH UR: 6 — SIGNIFICANT CHANGE UP (ref 5–8)
PROT UR-MCNC: ABNORMAL
RBC CASTS # UR COMP ASSIST: 3 /HPF — SIGNIFICANT CHANGE UP (ref 0–4)
SP GR SPEC: 1.03 — SIGNIFICANT CHANGE UP (ref 1.01–1.05)
UROBILINOGEN FLD QL: SIGNIFICANT CHANGE UP
WBC UR QL: 6 /HPF — HIGH (ref 0–5)

## 2022-10-10 RX ORDER — CEPHALEXIN 500 MG
500 CAPSULE ORAL ONCE
Refills: 0 | Status: COMPLETED | OUTPATIENT
Start: 2022-10-10 | End: 2022-10-10

## 2022-10-10 RX ORDER — DIPHENHYDRAMINE HCL 50 MG
25 CAPSULE ORAL ONCE
Refills: 0 | Status: DISCONTINUED | OUTPATIENT
Start: 2022-10-10 | End: 2022-10-13

## 2022-10-10 RX ORDER — CEPHALEXIN 500 MG
1 CAPSULE ORAL
Qty: 8 | Refills: 0
Start: 2022-10-10 | End: 2022-10-13

## 2022-10-10 RX ADMIN — Medication 975 MILLIGRAM(S): at 00:20

## 2022-10-10 RX ADMIN — LIDOCAINE 1 PATCH: 4 CREAM TOPICAL at 00:21

## 2022-10-10 RX ADMIN — Medication 500 MILLIGRAM(S): at 02:17

## 2022-10-10 NOTE — PROVIDER CONTACT NOTE (OTHER) - ASSESSMENT
Per provider, Pt is cleared and can return to their previous resident, [address] [place] [address] . SW has spoken Jesuse  at [phone]. SW confirmed that patient mode of transportation is taxi or bus and that pt travels independently.  Pt stated that he wanted to take a taxi by to [address] [place] [address] Residence.  Clinical provider agrees with taxi back to group home. Verbal Huddle regarding coordination of care completed with the interdisciplinary team. Aubrey MOREJON provided confirmation [id number]. [employer] arranged for Pt at [phone].

## 2022-10-10 NOTE — ED PROVIDER NOTE - PATIENT PORTAL LINK FT
You can access the FollowMyHealth Patient Portal offered by [facility] by registering at the following website: [email or web address]. By joining [employer]’s FollowMyHealth portal, you will also be able to view your health information using other applications (apps) compatible with our system.

## 2022-10-10 NOTE — ED PROVIDER NOTE - MUSCULOSKELETAL [+], MLM
Patient, Myra Woo, reported today for weight check with Mom and Grandma. Myra Woo was alert during the visit with eyes open and looking around. He did have a bowel movement while in the office, bring his BM count for the day up to 4. She is not sure how many wet diapers per day he is having since he is pooping so much and is not sure if there is pee in with the poop; she assumes there is. She knows for sure that he had 1 pee only diaper so far today before coming in. From my observation, the diaper she changed that had BM in it also had urine. Mom states that his circumcision had to be cauterized due to bleeding. Circumcision looked good. Mom was applying Vaseline to diaper. I did advise her to add some to the penis to help prevent sticking just for an extra layer of protection. Myra Woo is breast fed. Mom is having a harder time having him latch so she is pumping and feeding him via bottle, which he takes great. She had to supplement 1oz of premixed formula yesterday because he was hungry while she was pumping. Myra Woo does have a upper lip tie that could be causing latching issues for latching to breast but is not causing issues for bottle feeding. Patient had hyperactive bowel sounds and normal heart sounds. Patient is very strong, able to lift head for a short duration of time, and is a very attentive baby. Mom states that last night at home went well. Sister has not met him yet, but will on Sunday - she is very excited. Mom states she is doing well as well. Asked if she has enough help and support and she states she does.
BACK PAIN

## 2022-10-10 NOTE — ED PROVIDER NOTE - NSFOLLOWUPINSTRUCTIONS_ED_ALL_ED_FT
You have a urinary tract infection.  Take keflex 500mg twice a day for 7 days.      Drink plenty of fluids.  You can take Tylenol 650mg every 4 hours as needed for pain or fever.  Apply warm compresses and heating pads as needed for pain and discomfort.  Follow-up with your gynecologist in 1-2 weeks.  Return to the emergency department for any new or worsening symptoms.

## 2022-10-10 NOTE — ED PROVIDER NOTE - OBJECTIVE STATEMENT
38 y/o F with h/o endometriosis, bipolar disorder, seizures from group home with low back pain.  pt states she gets these pains when she is on her period.  No fever, fall or injury, ivdu, bladder or bowel dysfunction, saddle anesthesia, leg weakness pain or numbness/tingling.  She did not take any meds pta.  No n/v/c/d, dysuria, vaginal discharge.

## 2022-10-10 NOTE — ED PROVIDER NOTE - CLINICAL SUMMARY MEDICAL DECISION MAKING FREE TEXT BOX
38 y/o F with h/o obesity, endometriosis here with low back pain x 1 day.  Pt reports she gets this pain during her periods from her endometriosis.  No red flag s/s to suggest cord compression, no trauma.  Mild suprapbic tenderness on exam - will r/o pregnancy, uti, pain meds, dc with outpt follow-up.

## 2022-10-11 LAB
CULTURE RESULTS: SIGNIFICANT CHANGE UP
SPECIMEN SOURCE: SIGNIFICANT CHANGE UP

## 2022-10-17 ENCOUNTER — APPOINTMENT (OUTPATIENT)
Dept: ORTHOPEDIC SURGERY | Facility: CLINIC | Age: 37
End: 2022-10-17

## 2022-10-17 ENCOUNTER — NON-APPOINTMENT (OUTPATIENT)
Age: 37
End: 2022-10-17

## 2022-10-17 VITALS — BODY MASS INDEX: 41.22 KG/M2 | WEIGHT: 272 LBS | HEIGHT: 68 IN

## 2022-10-17 DIAGNOSIS — M22.2X1 PATELLOFEMORAL DISORDERS, RIGHT KNEE: ICD-10-CM

## 2022-10-17 PROCEDURE — 99204 OFFICE O/P NEW MOD 45 MIN: CPT

## 2022-10-17 RX ORDER — IRON ASPGLY,PS/C/B12/FA/CA/SUC 150-25-1
50-100 CAPSULE ORAL
Qty: 30 | Refills: 0 | Status: ACTIVE | COMMUNITY
Start: 2022-05-09

## 2022-10-17 RX ORDER — BENZOCAINE, MENTHOL 15; 3.6 MG/1; MG/1
15-3.6 LOZENGE ORAL
Qty: 18 | Refills: 0 | Status: ACTIVE | COMMUNITY
Start: 2022-05-29

## 2022-10-17 RX ORDER — ACETAMINOPHEN 650 MG/1
650 TABLET, FILM COATED, EXTENDED RELEASE ORAL
Qty: 90 | Refills: 0 | Status: ACTIVE | COMMUNITY
Start: 2022-05-20

## 2022-10-17 NOTE — REASON FOR VISIT
[Initial Visit] : an initial visit for [Formal Caregiver] : formal caregiver [FreeTextEntry2] : right knee pain

## 2022-10-17 NOTE — HISTORY OF PRESENT ILLNESS
[de-identified] : Patient is here for right knee pain that began in 2016 without inciting injury. She states that she was exposed to cold air during the [place]. She states that she has stiffness. This is her first evaluation. There was no recent injury. She has taken Tylenol and Flexeril to treat it. She was seen at [facility] a month or two ago. She states they did an xray. Denies N/T/R/Prior injury. She does not work. She walks for exercise. \par \par The patient's past medical history, past surgical history, medications and allergies were reviewed by me today and documented accordingly. In addition, the patient's family and social history, which were noncontributory to this visit, were reviewed also. Intake form was reviewed. The patient has no family history of arthritis.

## 2022-10-17 NOTE — PHYSICAL EXAM
[de-identified] : Constitutional: Well-nourished, well-developed, No acute distress, obese\par Respiratory:  Good respiratory effort, no SOB\par Lymphatic: No regional lymphadenopathy, no lymphedema\par Psychiatric: Pleasant and normal affect, alert and oriented x3\par Musculoskeletal: normal except where as noted in regional exam\par \par Right knee:\par APPEARANCE: no marked deformities, no swelling or malalignment\par POSITIVE TENDERNESS:  + crepitus of the anterior knee, and tenderness of patellar retinaculum\par NONTENDER: jt lines b/l, patellar & quadriceps tendons, MCL/LCL, ITB at the lateral femoral condyle & Gerdy's tubercle, pes bursa. \par ROM: full & painless, although some discomfort in deep knee flexion\par RESISTIVE TESTING: + discomfort with knee ext from deep knee flexion (stretched position), painless knee flexion. \par SPECIAL TESTS: stable v/v stress. painless grind. neg Lachman's. neg ant/post drawer. neg Perlita's. \par  [de-identified] : I reviewed, interpreted and clinically correlated the following outside imaging studies,\par In system x-rays, 2 views of the right knee, no significant degenerative changes seen, no fracture seen, no abnormal findings.\par \par ______________\par  ACC: [id number] EXAM: XR KNEE 1-2 VIEWS RT\par \par PROCEDURE DATE: 09/21/2022\par \par \par \par INTERPRETATION: CLINICAL INDICATION: right knee pain for a year\par \par EXAM:\par AP lateral right knee from 9/21/2022 at 1830. No similar prior studies available for comparison.\par \par IMPRESSION:\par No fracture, dislocation, or joint effusion.\par \par Preserved joint spaces with smooth and intact articular surfaces. No joint margin erosions or inta-articular or periarticular calcifications.\par \par Unremarkable quadriceps and patellar tendon shadows.\par \par No destructive osseous lesions or periosteal reaction.

## 2022-10-17 NOTE — DISCUSSION/SUMMARY
[de-identified] : Discussed findings of today's exam and possible causes of patient's pain.  Educated patient on their most probable diagnosis of chronic intermittent right knee pain with recent atraumatic exacerbation due to patellofemoral pain syndrome.  Reviewed possible courses of treatment, and we collaboratively decided best course of treatment at this time will include conservative management.  Patient has intermittent right knee pain since 2016, there is no injury or trauma at that time.  She has no evidence of internal derangement, no surgical indications, no need for MRI at this time.  Patient will be started on a course of physical therapy to restore normal range of motion and strength as tolerated.  Patient is already on a multitude of medications, do not recommend any new medications for this issue, she can take Tylenol and/or oral NSAIDs as needed for pain relief, these are over-the-counter medications.  The patient lives in a group home, they will help coordinate physical therapy for her.  Follow up as needed.  Patient appreciates and agrees with current plan.  Patient is accompanied to the office today by her medical coordinator from her group home.\par \par I work as part of an academic orthopedic group and routinely have a physician in training (resident / fellow) working with me.  Any part of the history and physical exam performed by the physician in training was either directly reviewed and/or replicated by myself.  Any procedure performed by the physician in training was performed under my direct supervision and with the consent of the patient.\par \par This note was generated using dragon medical dictation software.  A reasonable effort has been made for proofreading its contents, but typos may still remain.  If there are any questions or points of clarification needed please notify my office.\par

## 2022-10-17 NOTE — CONSULT LETTER
[Dear  ___] : Dear  [unfilled], [Consult Letter:] : I had the pleasure of evaluating your patient, [unfilled]. [Please see my note below.] : Please see my note below. [Consult Closing:] : Thank you very much for allowing me to participate in the care of this patient.  If you have any questions, please do not hesitate to contact me. [Sincerely,] : Sincerely, [FreeTextEntry2] : PCP [FreeTextEntry3] : Wilman Rogers DO, ATC\par Primary Care Sports Medicine\par [facility] [place]

## 2022-11-15 NOTE — ED PROVIDER NOTE - IV ALTEPLASE ADMIN OUTSIDE HIDDEN
show Shaylee Clayton, Attending Physician: 7yM with no PMH here for fever x 3d associated with R ear pain. +rhinorrhea. Nothing makes ear pain worse or better. No drainage. No vomiting or diarrhea. No rashes. No ear swelling.     PMH: none   PSH: knee  Allergies: none  Vaccinations: UTD

## 2022-11-29 ENCOUNTER — APPOINTMENT (OUTPATIENT)
Dept: INTERNAL MEDICINE | Facility: CLINIC | Age: 37
End: 2022-11-29

## 2022-12-08 ENCOUNTER — NON-APPOINTMENT (OUTPATIENT)
Age: 37
End: 2022-12-08

## 2022-12-13 NOTE — ED PROVIDER NOTE - CLINICAL SUMMARY MEDICAL DECISION MAKING FREE TEXT BOX
98.9 Pt with frequent visits to the ED, presenting with hematuria yesterday but upon discharge, notes pt was on menses. Well appearing, walking around the ED, NAD: check EKG, orthostatics, PO challenge and D/C.

## 2022-12-16 ENCOUNTER — APPOINTMENT (OUTPATIENT)
Dept: PEDIATRIC ALLERGY IMMUNOLOGY | Facility: CLINIC | Age: 37
End: 2022-12-16

## 2022-12-16 VITALS
DIASTOLIC BLOOD PRESSURE: 80 MMHG | TEMPERATURE: 96.44 F | SYSTOLIC BLOOD PRESSURE: 114 MMHG | HEART RATE: 71 BPM | OXYGEN SATURATION: 100 %

## 2022-12-16 DIAGNOSIS — J30.89 OTHER ALLERGIC RHINITIS: ICD-10-CM

## 2022-12-16 DIAGNOSIS — H10.10 ALLERGIC RHINITIS, UNSPECIFIED: ICD-10-CM

## 2022-12-16 DIAGNOSIS — T78.1XXA OTHER ADVERSE FOOD REACTIONS, NOT ELSEWHERE CLASSIFIED, INITIAL ENCOUNTER: ICD-10-CM

## 2022-12-16 DIAGNOSIS — J30.9 ALLERGIC RHINITIS, UNSPECIFIED: ICD-10-CM

## 2022-12-16 DIAGNOSIS — L25.3 UNSPECIFIED CONTACT DERMATITIS DUE TO OTHER CHEMICAL PRODUCTS: ICD-10-CM

## 2022-12-16 PROCEDURE — 99214 OFFICE O/P EST MOD 30 MIN: CPT | Mod: GC

## 2022-12-16 RX ORDER — HYDROCORTISONE 10 MG/G
1 CREAM TOPICAL
Refills: 0 | Status: DISCONTINUED | COMMUNITY
End: 2022-12-16

## 2022-12-16 RX ORDER — CEPHALEXIN 500 MG/1
500 CAPSULE ORAL
Qty: 8 | Refills: 0 | Status: DISCONTINUED | COMMUNITY
Start: 2022-10-10 | End: 2022-12-16

## 2022-12-16 RX ORDER — OXYCODONE 5 MG/1
5 TABLET ORAL
Qty: 4 | Refills: 0 | Status: DISCONTINUED | COMMUNITY
Start: 2022-08-13 | End: 2022-12-16

## 2022-12-16 RX ORDER — DOXYCYCLINE HYCLATE 100 MG/1
100 CAPSULE ORAL
Qty: 10 | Refills: 0 | Status: DISCONTINUED | COMMUNITY
Start: 2022-07-01 | End: 2022-12-16

## 2022-12-16 RX ORDER — MUPIROCIN 20 MG/G
2 OINTMENT TOPICAL
Qty: 22 | Refills: 0 | Status: DISCONTINUED | COMMUNITY
Start: 2022-05-09 | End: 2022-12-16

## 2022-12-16 RX ORDER — LORAZEPAM 0.5 MG/1
0.5 TABLET ORAL
Qty: 60 | Refills: 0 | Status: DISCONTINUED | COMMUNITY
Start: 2022-06-07 | End: 2022-12-16

## 2022-12-16 RX ORDER — METOCLOPRAMIDE 5 MG/1
5 TABLET ORAL
Qty: 30 | Refills: 0 | Status: DISCONTINUED | COMMUNITY
Start: 2022-09-10 | End: 2022-12-16

## 2022-12-16 RX ORDER — ONDANSETRON 4 MG/1
4 TABLET, ORALLY DISINTEGRATING ORAL
Qty: 12 | Refills: 0 | Status: DISCONTINUED | COMMUNITY
Start: 2022-08-18 | End: 2022-12-16

## 2022-12-16 RX ORDER — DIVALPROEX SODIUM 500 1/1
500 TABLET, EXTENDED RELEASE ORAL
Qty: 90 | Refills: 0 | Status: DISCONTINUED | COMMUNITY
Start: 2022-05-18 | End: 2022-12-16

## 2022-12-16 RX ORDER — MOMETASONE FUROATE 100 %
POWDER (GRAM) MISCELLANEOUS
Refills: 0 | Status: DISCONTINUED | COMMUNITY
End: 2022-12-16

## 2022-12-16 RX ORDER — METRONIDAZOLE 7.5 MG/G
0.75 LOTION TOPICAL
Refills: 0 | Status: DISCONTINUED | COMMUNITY
Start: 2022-05-16 | End: 2022-12-16

## 2022-12-16 RX ORDER — FLUTICASONE PROPIONATE 50 UG/1
50 SPRAY, METERED NASAL TWICE DAILY
Refills: 0 | Status: DISCONTINUED | COMMUNITY
End: 2022-12-16

## 2022-12-16 RX ORDER — LURASIDONE HYDROCHLORIDE 40 MG/1
40 TABLET, FILM COATED ORAL
Refills: 0 | Status: DISCONTINUED | COMMUNITY
End: 2022-12-16

## 2022-12-16 RX ORDER — LIDOCAINE 4 G/100G
4 PATCH TOPICAL
Qty: 10 | Refills: 0 | Status: DISCONTINUED | COMMUNITY
Start: 2022-07-11 | End: 2022-12-16

## 2022-12-16 RX ORDER — METHYLPREDNISOLONE 4 MG/1
4 TABLET ORAL
Qty: 21 | Refills: 0 | Status: DISCONTINUED | COMMUNITY
Start: 2022-05-04 | End: 2022-12-16

## 2022-12-16 RX ORDER — GLUCOSAMINE HCL/CHONDROITIN SU 500-400 MG
3 CAPSULE ORAL
Qty: 30 | Refills: 0 | Status: DISCONTINUED | COMMUNITY
Start: 2022-08-02 | End: 2022-12-16

## 2022-12-16 RX ORDER — OLOPATADINE HCL 1 MG/ML
0.1 SOLUTION/ DROPS OPHTHALMIC TWICE DAILY
Qty: 1 | Refills: 5 | Status: DISCONTINUED | COMMUNITY
Start: 2022-01-03 | End: 2022-12-16

## 2022-12-16 RX ORDER — NAPROXEN 375 MG/1
375 TABLET, DELAYED RELEASE ORAL
Qty: 6 | Refills: 0 | Status: DISCONTINUED | COMMUNITY
Start: 2022-08-18 | End: 2022-12-16

## 2022-12-16 RX ORDER — OXYCODONE AND ACETAMINOPHEN 5; 325 MG/1; MG/1
5-325 TABLET ORAL
Qty: 3 | Refills: 0 | Status: DISCONTINUED | COMMUNITY
Start: 2022-09-12 | End: 2022-12-16

## 2022-12-16 RX ORDER — APIXABAN 5 MG/1
5 TABLET, FILM COATED ORAL TWICE DAILY
Refills: 0 | Status: DISCONTINUED | COMMUNITY
End: 2022-12-16

## 2022-12-16 RX ORDER — CYCLOBENZAPRINE HYDROCHLORIDE 10 MG/1
10 TABLET, FILM COATED ORAL
Qty: 90 | Refills: 0 | Status: DISCONTINUED | COMMUNITY
Start: 2022-09-23 | End: 2022-12-16

## 2022-12-16 RX ORDER — DOCUSATE SODIUM 100 MG/1
100 CAPSULE, LIQUID FILLED ORAL AT BEDTIME
Refills: 0 | Status: DISCONTINUED | COMMUNITY
Start: 2022-05-20 | End: 2022-12-16

## 2022-12-16 RX ORDER — IBUPROFEN 400 MG/1
400 TABLET, FILM COATED ORAL
Qty: 6 | Refills: 0 | Status: DISCONTINUED | COMMUNITY
Start: 2022-07-05 | End: 2022-12-16

## 2022-12-16 RX ORDER — MELATONIN 200 MCG
3 TABLET ORAL
Qty: 30 | Refills: 0 | Status: DISCONTINUED | COMMUNITY
Start: 2022-10-03 | End: 2022-12-16

## 2022-12-16 RX ORDER — KETOCONAZOLE 20.5 MG/ML
2 SHAMPOO, SUSPENSION TOPICAL
Qty: 120 | Refills: 0 | Status: DISCONTINUED | COMMUNITY
Start: 2022-05-16 | End: 2022-12-16

## 2022-12-16 RX ORDER — SULFAMETHOXAZOLE AND TRIMETHOPRIM 800; 160 MG/1; MG/1
800-160 TABLET ORAL
Qty: 10 | Refills: 0 | Status: DISCONTINUED | COMMUNITY
Start: 2022-06-08 | End: 2022-12-16

## 2022-12-16 RX ORDER — IBUPROFEN 600 MG/1
600 TABLET, FILM COATED ORAL
Qty: 12 | Refills: 0 | Status: DISCONTINUED | COMMUNITY
Start: 2022-08-13 | End: 2022-12-16

## 2022-12-16 RX ORDER — AZELASTINE HYDROCHLORIDE 137 UG/1
137 SPRAY, METERED NASAL TWICE DAILY
Qty: 1 | Refills: 2 | Status: ACTIVE | COMMUNITY
Start: 2022-12-16 | End: 1900-01-01

## 2022-12-16 NOTE — REVIEW OF SYSTEMS
[Nasal Congestion] : nasal congestion [Sneezing] : sneezing [Urticaria] : urticaria [Nl] : Endocrine

## 2022-12-16 NOTE — HISTORY OF PRESENT ILLNESS
[FreeTextEntry1] : 38 yo woman h/o bipolar disorder, factor V Leiden, seizure disorder transferred to [facility] 6/19/2022 for evaluation left deltoid pain and swelling.  CT showed 3 x 4 x 1 cm fluid collection in left deltoid. Underwent IR aspiration 6/29- 4 cc dark red fluid aspirated and sent for routine, fungal and AFB cultures.\par Received clinda, then vanco ; lost iv access at times during hospitalization.\par Now back in group home.  feels ok.  \par Medical coordinator reports patient seen by dermatology- \par US showed 0.7 cystic focus in sub fat likely fat necrosis.\par No fever, chills. 
97.7

## 2022-12-21 ENCOUNTER — INPATIENT (INPATIENT)
Facility: HOSPITAL | Age: 37
LOS: 4 days | Discharge: HOME | End: 2022-12-26
Attending: STUDENT IN AN ORGANIZED HEALTH CARE EDUCATION/TRAINING PROGRAM | Admitting: STUDENT IN AN ORGANIZED HEALTH CARE EDUCATION/TRAINING PROGRAM

## 2022-12-21 VITALS
OXYGEN SATURATION: 100 % | RESPIRATION RATE: 17 BRPM | TEMPERATURE: 99 F | HEART RATE: 112 BPM | DIASTOLIC BLOOD PRESSURE: 66 MMHG | SYSTOLIC BLOOD PRESSURE: 125 MMHG

## 2022-12-21 LAB
ALBUMIN SERPL ELPH-MCNC: 4.6 G/DL — SIGNIFICANT CHANGE UP (ref 3.3–5)
ALP SERPL-CCNC: 71 U/L — SIGNIFICANT CHANGE UP (ref 40–120)
ALT FLD-CCNC: 16 U/L — SIGNIFICANT CHANGE UP (ref 4–33)
ANION GAP SERPL CALC-SCNC: 17 MMOL/L — HIGH (ref 7–14)
APTT BLD: 20.2 SEC — LOW (ref 27–36.3)
AST SERPL-CCNC: 18 U/L — SIGNIFICANT CHANGE UP (ref 4–32)
BASOPHILS # BLD AUTO: 0.02 K/UL — SIGNIFICANT CHANGE UP (ref 0–0.2)
BASOPHILS NFR BLD AUTO: 0.3 % — SIGNIFICANT CHANGE UP (ref 0–2)
BILIRUB SERPL-MCNC: 0.2 MG/DL — SIGNIFICANT CHANGE UP (ref 0.2–1.2)
BUN SERPL-MCNC: 16 MG/DL — SIGNIFICANT CHANGE UP (ref 7–23)
CALCIUM SERPL-MCNC: 9.6 MG/DL — SIGNIFICANT CHANGE UP (ref 8.4–10.5)
CHLORIDE SERPL-SCNC: 102 MMOL/L — SIGNIFICANT CHANGE UP (ref 98–107)
CO2 SERPL-SCNC: 19 MMOL/L — LOW (ref 22–31)
CREAT SERPL-MCNC: 0.81 MG/DL — SIGNIFICANT CHANGE UP (ref 0.5–1.3)
EGFR: 97 ML/MIN/1.73M2 — SIGNIFICANT CHANGE UP
EOSINOPHIL # BLD AUTO: 0.03 K/UL — SIGNIFICANT CHANGE UP (ref 0–0.5)
EOSINOPHIL NFR BLD AUTO: 0.5 % — SIGNIFICANT CHANGE UP (ref 0–6)
FLUAV AG NPH QL: SIGNIFICANT CHANGE UP
FLUBV AG NPH QL: SIGNIFICANT CHANGE UP
GLUCOSE SERPL-MCNC: 83 MG/DL — SIGNIFICANT CHANGE UP (ref 70–99)
HCG SERPL-ACNC: <5 MIU/ML — SIGNIFICANT CHANGE UP
HCT VFR BLD CALC: 35.2 % — SIGNIFICANT CHANGE UP (ref 34.5–45)
HGB BLD-MCNC: 11.2 G/DL — LOW (ref 11.5–15.5)
IANC: 3.21 K/UL — SIGNIFICANT CHANGE UP (ref 1.8–7.4)
IMM GRANULOCYTES NFR BLD AUTO: 0.5 % — SIGNIFICANT CHANGE UP (ref 0–0.9)
INR BLD: 1.1 RATIO — SIGNIFICANT CHANGE UP (ref 0.88–1.16)
LYMPHOCYTES # BLD AUTO: 2.43 K/UL — SIGNIFICANT CHANGE UP (ref 1–3.3)
LYMPHOCYTES # BLD AUTO: 39.1 % — SIGNIFICANT CHANGE UP (ref 13–44)
MCHC RBC-ENTMCNC: 29.4 PG — SIGNIFICANT CHANGE UP (ref 27–34)
MCHC RBC-ENTMCNC: 31.8 GM/DL — LOW (ref 32–36)
MCV RBC AUTO: 92.4 FL — SIGNIFICANT CHANGE UP (ref 80–100)
MONOCYTES # BLD AUTO: 0.49 K/UL — SIGNIFICANT CHANGE UP (ref 0–0.9)
MONOCYTES NFR BLD AUTO: 7.9 % — SIGNIFICANT CHANGE UP (ref 2–14)
NEUTROPHILS # BLD AUTO: 3.21 K/UL — SIGNIFICANT CHANGE UP (ref 1.8–7.4)
NEUTROPHILS NFR BLD AUTO: 51.7 % — SIGNIFICANT CHANGE UP (ref 43–77)
NRBC # BLD: 0 /100 WBCS — SIGNIFICANT CHANGE UP (ref 0–0)
NRBC # FLD: 0 K/UL — SIGNIFICANT CHANGE UP (ref 0–0)
PLATELET # BLD AUTO: 185 K/UL — SIGNIFICANT CHANGE UP (ref 150–400)
POTASSIUM SERPL-MCNC: 4.2 MMOL/L — SIGNIFICANT CHANGE UP (ref 3.5–5.3)
POTASSIUM SERPL-SCNC: 4.2 MMOL/L — SIGNIFICANT CHANGE UP (ref 3.5–5.3)
PROT SERPL-MCNC: 8 G/DL — SIGNIFICANT CHANGE UP (ref 6–8.3)
PROTHROM AB SERPL-ACNC: 12.8 SEC — SIGNIFICANT CHANGE UP (ref 10.5–13.4)
RBC # BLD: 3.81 M/UL — SIGNIFICANT CHANGE UP (ref 3.8–5.2)
RBC # FLD: 14 % — SIGNIFICANT CHANGE UP (ref 10.3–14.5)
RSV RNA NPH QL NAA+NON-PROBE: SIGNIFICANT CHANGE UP
SARS-COV-2 RNA SPEC QL NAA+PROBE: SIGNIFICANT CHANGE UP
SODIUM SERPL-SCNC: 138 MMOL/L — SIGNIFICANT CHANGE UP (ref 135–145)
TROPONIN T, HIGH SENSITIVITY RESULT: <6 NG/L — SIGNIFICANT CHANGE UP
WBC # BLD: 6.21 K/UL — SIGNIFICANT CHANGE UP (ref 3.8–10.5)
WBC # FLD AUTO: 6.21 K/UL — SIGNIFICANT CHANGE UP (ref 3.8–10.5)

## 2022-12-21 PROCEDURE — 93971 EXTREMITY STUDY: CPT | Mod: 26,LT

## 2022-12-21 PROCEDURE — 71275 CT ANGIOGRAPHY CHEST: CPT | Mod: 26,MA

## 2022-12-21 PROCEDURE — 93010 ELECTROCARDIOGRAM REPORT: CPT

## 2022-12-21 PROCEDURE — 99285 EMERGENCY DEPT VISIT HI MDM: CPT

## 2022-12-21 RX ORDER — ACETAMINOPHEN 500 MG
650 TABLET ORAL ONCE
Refills: 0 | Status: COMPLETED | OUTPATIENT
Start: 2022-12-21 | End: 2022-12-21

## 2022-12-21 RX ORDER — OXYCODONE HYDROCHLORIDE 5 MG/1
5 TABLET ORAL ONCE
Refills: 0 | Status: DISCONTINUED | OUTPATIENT
Start: 2022-12-21 | End: 2022-12-21

## 2022-12-21 RX ORDER — IBUPROFEN 200 MG
600 TABLET ORAL ONCE
Refills: 0 | Status: DISCONTINUED | OUTPATIENT
Start: 2022-12-21 | End: 2022-12-21

## 2022-12-21 RX ORDER — SODIUM CHLORIDE 9 MG/ML
1000 INJECTION INTRAMUSCULAR; INTRAVENOUS; SUBCUTANEOUS ONCE
Refills: 0 | Status: COMPLETED | OUTPATIENT
Start: 2022-12-21 | End: 2022-12-21

## 2022-12-21 RX ADMIN — OXYCODONE HYDROCHLORIDE 5 MILLIGRAM(S): 5 TABLET ORAL at 21:48

## 2022-12-21 RX ADMIN — SODIUM CHLORIDE 1000 MILLILITER(S): 9 INJECTION INTRAMUSCULAR; INTRAVENOUS; SUBCUTANEOUS at 21:56

## 2022-12-21 RX ADMIN — Medication 650 MILLIGRAM(S): at 19:13

## 2022-12-21 NOTE — ED ADULT NURSE NOTE - OBJECTIVE STATEMENT
Patient received to room 6a ([facility]). Patient is A&OX4 ambulatory at baseline coming from group home; complaining of left leg pain x2 days. Patient states "she feels like she is having a DVT". Patient denies chest pain sob n/v. PMH bronchitis, asthma, dvt on eliquis.

## 2022-12-21 NOTE — ED PROVIDER NOTE - ATTENDING APP SHARED VISIT CONTRIBUTION OF CARE
The patient is a 37y Female who has a past medical and surgery history of Asthma Seizure Factor V Leiden DVT in adulthood RLE on eliquis Bipolar disorder Hypothyroid Seasonal allergies Insomnia GERD HTN Diabetes PTED from group home c/o CP SOB LL extremity pain and dizziness since Monday, No fever chills cough sputum hemoptysis  Pt stated in triage that normal HR is 120.   Vital Signs Last 24 Hrs  T(F): 98.6 HR: 112 BP: 125/66 RR: 17 SpO2: 100% (21 Dec 2022 16:50)  PE: as described; my additions and exceptions are noted in the chart    DATA:  EKG: pending at time of evaluation  [id number] EKG showed Normal sinus rhythm  Normal ECG    LAB: Pending at time of evaluation    IMPRESSION/RISK:  Dx=Necessary to r/O PE     Consideration include ECG labs trop bnp CTPA  Plan  reassess; dispo as per results and response

## 2022-12-21 NOTE — HISTORY OF PRESENT ILLNESS
[de-identified] : YOIDT MORROW is a 37 year old Black or  Jie female with epilepsy (on Trileptal), Factor V Leiden (hx of PE/DVTs; on Eliquis), IBS, hypothyroid, endometriosis, bipolar disorder who presents for follow up of allergic rhinitis, drug allergy, pineapple intolerance and contact dermatitis. Last seen 12/2021\par \par MEDICATION ALLERGY \par - avoiding Penicillin, Toradol, Trazodone, Zofran\par - needs evaluation of reaction to morphine, tramadol and codeine \par - morphine - unsure why this is listed as her allergy. Received morphine this summer when she went to the hospital for pain. Did not have any reactions. \par - Tramadol - had hives 5 minutes after infusion through the IV. reaction was this year. She does not recall why she needed tramadol. unclear history. \par - codeine - unsure why this is listed as allergy. reports that she last took it in September in the hospital.  \par \par ALLERGIC RHINITIS \par - takes Benadryl or oral antihistamine. need almost every day. \par - sneezing. sometimes have hives on her back. \par - Flonase was not helping. was using it everyday. \par \par LATEX - has not been a problem \par \par PINEAPPLE - had reaction to pineapple candy - itchy eyes,nose, mouth. \par - able to tolerate heated pineapple \par

## 2022-12-21 NOTE — ED PROVIDER NOTE - OBJECTIVE STATEMENT
37yF w/pmhx Factor V Leiden, prior DVT/PE on Eliquis, seizure disorder, bipolar, asthma BIBEMS from group home with shortness of breath, lightheadedness and left calf pain x 2 days. Pt reports for the past 2 days she is feeling fatigued, has cough, developed lightheadedness and shortness of breath. Pt reports left lower chest pain with deep inspiration. At times she has upper abdominal pain. Pt reports calf pain feels similar to prior DVTs. Pt denies fever/chills, nausea, vomiting, diarrhea, cough, syncope, back pain, urinary complaints, recent travel or illness or any other concerns.

## 2022-12-21 NOTE — PHYSICAL EXAM
[Alert] : alert [Well Nourished] : well nourished [Healthy Appearance] : healthy appearance [No Acute Distress] : no acute distress [Well Developed] : well developed [No Discharge] : no discharge [No Photophobia] : no photophobia [Sclera Not Icteric] : sclera not icteric [Suborbital Bogginess] : suborbital bogginess (allergic shiners) [Normal Lips/Tongue] : the lips and tongue were normal [Normal Outer Ear/Nose] : the ears and nose were normal in appearance [Normal Tonsils] : normal tonsils [No Thrush] : no thrush [Pale mucosa] : pale mucosa [Boggy Nasal Turbinates] : boggy and/or pale nasal turbinates [Pharyngeal erythema] : pharyngeal erythema [Supple] : the neck was supple [Normal Rate and Effort] : normal respiratory rhythm and effort [No Crackles] : no crackles [No Retractions] : no retractions [Bilateral Audible Breath Sounds] : bilateral audible breath sounds [Normal Rate] : heart rate was normal  [Normal S1, S2] : normal S1 and S2 [No murmur] : no murmur [Regular Rhythm] : with a regular rhythm [Soft] : abdomen soft [Not Distended] : not distended [Normal Cervical Lymph Nodes] : cervical [Skin Intact] : skin intact  [No Rash] : no rash [No Skin Lesions] : no skin lesions [No clubbing] : no clubbing [No Edema] : no edema [No Cyanosis] : no cyanosis [Normal Mood] : mood was normal [Normal Affect] : affect was normal [Alert, Awake, Oriented as Age-Appropriate] : alert, awake, oriented as age appropriate [Conjunctival Erythema] : no conjunctival erythema [Posterior Pharyngeal Cobblestoning] : no posterior pharyngeal cobblestoning [Clear Rhinorrhea] : no clear rhinorrhea was seen [Exudate] : no exudate [Wheezing] : no wheezing was heard [Patches] : no patches

## 2022-12-21 NOTE — ED ADULT TRIAGE NOTE - CHIEF COMPLAINT QUOTE
Presents to ED from group home c/o LL extremity pain and dizziness since Monday, PMH of DVT on Eliquis, epilepsy, asthma. Pt states normal HR is 120. Pt admits to SOB at this time.

## 2022-12-21 NOTE — ED PROVIDER NOTE - CLINICAL SUMMARY MEDICAL DECISION MAKING FREE TEXT BOX
37yF w/pmhx Factor V Leiden, prior DVT/PE on Eliquis, seizure disorder, bipolar, asthma BIBEMS from group home with shortness of breath, lightheadedness and left calf pain x 2 days. Associated she has cough and fatigue, reports left lower chest pain with deep inspiration. On exam pt is well appearing, afebrile, slight tachycardia, non tender abd, lungs clear to auscultation. EKG showing sinus tachycardia @106 non ischemic. Concern for DVT/PE, less likely ACS, possible viral like illness. Plan: cbc/cmp, trop/bnp, CTa chest to r/o PE, US duplex LLE, IV fluids, tylenol for pain control.

## 2022-12-21 NOTE — H&P ADULT - NSHPRISKHIVSCREEN_GEN_ALL_CORE
For information on Fall & Injury Prevention, visit: [email or web address] OR  [email or web address] OR  https://www.cdc.gov/steadi/patient.html Offered and patient declined

## 2022-12-21 NOTE — ED PROVIDER NOTE - CPE EDP NEURO NORM
Patient requesting a urinalysis due to hx of recurrent urine infections, kidney transplant status, and current dysuria. Placed the order for the urine test   normal...

## 2022-12-22 ENCOUNTER — NON-APPOINTMENT (OUTPATIENT)
Age: 37
End: 2022-12-22

## 2022-12-22 DIAGNOSIS — R06.02 SHORTNESS OF BREATH: ICD-10-CM

## 2022-12-22 DIAGNOSIS — K21.9 GASTRO-ESOPHAGEAL REFLUX DISEASE WITHOUT ESOPHAGITIS: ICD-10-CM

## 2022-12-22 DIAGNOSIS — J45.909 UNSPECIFIED ASTHMA, UNCOMPLICATED: ICD-10-CM

## 2022-12-22 DIAGNOSIS — R63.8 OTHER SYMPTOMS AND SIGNS CONCERNING FOOD AND FLUID INTAKE: ICD-10-CM

## 2022-12-22 DIAGNOSIS — D68.51 ACTIVATED PROTEIN C RESISTANCE: ICD-10-CM

## 2022-12-22 DIAGNOSIS — M79.669 PAIN IN UNSPECIFIED LOWER LEG: ICD-10-CM

## 2022-12-22 DIAGNOSIS — I82.409 ACUTE EMBOLISM AND THROMBOSIS OF UNSPECIFIED DEEP VEINS OF UNSPECIFIED LOWER EXTREMITY: ICD-10-CM

## 2022-12-22 DIAGNOSIS — R07.9 CHEST PAIN, UNSPECIFIED: ICD-10-CM

## 2022-12-22 LAB
ALBUMIN SERPL ELPH-MCNC: 4.3 G/DL — SIGNIFICANT CHANGE UP (ref 3.3–5)
ALP SERPL-CCNC: 66 U/L — SIGNIFICANT CHANGE UP (ref 40–120)
ALT FLD-CCNC: 19 U/L — SIGNIFICANT CHANGE UP (ref 4–33)
ANION GAP SERPL CALC-SCNC: 11 MMOL/L — SIGNIFICANT CHANGE UP (ref 7–14)
AST SERPL-CCNC: 14 U/L — SIGNIFICANT CHANGE UP (ref 4–32)
BASOPHILS # BLD AUTO: 0.02 K/UL — SIGNIFICANT CHANGE UP (ref 0–0.2)
BASOPHILS NFR BLD AUTO: 0.3 % — SIGNIFICANT CHANGE UP (ref 0–2)
BILIRUB SERPL-MCNC: 0.3 MG/DL — SIGNIFICANT CHANGE UP (ref 0.2–1.2)
BUN SERPL-MCNC: 14 MG/DL — SIGNIFICANT CHANGE UP (ref 7–23)
CALCIUM SERPL-MCNC: 9.4 MG/DL — SIGNIFICANT CHANGE UP (ref 8.4–10.5)
CHLORIDE SERPL-SCNC: 103 MMOL/L — SIGNIFICANT CHANGE UP (ref 98–107)
CO2 SERPL-SCNC: 24 MMOL/L — SIGNIFICANT CHANGE UP (ref 22–31)
CREAT SERPL-MCNC: 0.68 MG/DL — SIGNIFICANT CHANGE UP (ref 0.5–1.3)
D DIMER BLD IA.RAPID-MCNC: 281 NG/ML DDU — HIGH
EGFR: 116 ML/MIN/1.73M2 — SIGNIFICANT CHANGE UP
EOSINOPHIL # BLD AUTO: 0.03 K/UL — SIGNIFICANT CHANGE UP (ref 0–0.5)
EOSINOPHIL NFR BLD AUTO: 0.5 % — SIGNIFICANT CHANGE UP (ref 0–6)
GLUCOSE SERPL-MCNC: 86 MG/DL — SIGNIFICANT CHANGE UP (ref 70–99)
HCT VFR BLD CALC: 32.9 % — LOW (ref 34.5–45)
HGB BLD-MCNC: 10.7 G/DL — LOW (ref 11.5–15.5)
IANC: 2.99 K/UL — SIGNIFICANT CHANGE UP (ref 1.8–7.4)
IMM GRANULOCYTES NFR BLD AUTO: 0.3 % — SIGNIFICANT CHANGE UP (ref 0–0.9)
LYMPHOCYTES # BLD AUTO: 2.39 K/UL — SIGNIFICANT CHANGE UP (ref 1–3.3)
LYMPHOCYTES # BLD AUTO: 40.7 % — SIGNIFICANT CHANGE UP (ref 13–44)
MAGNESIUM SERPL-MCNC: 1.8 MG/DL — SIGNIFICANT CHANGE UP (ref 1.6–2.6)
MCHC RBC-ENTMCNC: 29.6 PG — SIGNIFICANT CHANGE UP (ref 27–34)
MCHC RBC-ENTMCNC: 32.5 GM/DL — SIGNIFICANT CHANGE UP (ref 32–36)
MCV RBC AUTO: 90.9 FL — SIGNIFICANT CHANGE UP (ref 80–100)
MONOCYTES # BLD AUTO: 0.42 K/UL — SIGNIFICANT CHANGE UP (ref 0–0.9)
MONOCYTES NFR BLD AUTO: 7.2 % — SIGNIFICANT CHANGE UP (ref 2–14)
NEUTROPHILS # BLD AUTO: 2.99 K/UL — SIGNIFICANT CHANGE UP (ref 1.8–7.4)
NEUTROPHILS NFR BLD AUTO: 51 % — SIGNIFICANT CHANGE UP (ref 43–77)
NRBC # BLD: 0 /100 WBCS — SIGNIFICANT CHANGE UP (ref 0–0)
NRBC # FLD: 0 K/UL — SIGNIFICANT CHANGE UP (ref 0–0)
PHOSPHATE SERPL-MCNC: 3.3 MG/DL — SIGNIFICANT CHANGE UP (ref 2.5–4.5)
PLATELET # BLD AUTO: 220 K/UL — SIGNIFICANT CHANGE UP (ref 150–400)
POTASSIUM SERPL-MCNC: 3.8 MMOL/L — SIGNIFICANT CHANGE UP (ref 3.5–5.3)
POTASSIUM SERPL-SCNC: 3.8 MMOL/L — SIGNIFICANT CHANGE UP (ref 3.5–5.3)
PROT SERPL-MCNC: 7.6 G/DL — SIGNIFICANT CHANGE UP (ref 6–8.3)
RBC # BLD: 3.62 M/UL — LOW (ref 3.8–5.2)
RBC # FLD: 13.8 % — SIGNIFICANT CHANGE UP (ref 10.3–14.5)
SODIUM SERPL-SCNC: 138 MMOL/L — SIGNIFICANT CHANGE UP (ref 135–145)
TROPONIN T, HIGH SENSITIVITY RESULT: <6 NG/L — SIGNIFICANT CHANGE UP
WBC # BLD: 5.87 K/UL — SIGNIFICANT CHANGE UP (ref 3.8–10.5)
WBC # FLD AUTO: 5.87 K/UL — SIGNIFICANT CHANGE UP (ref 3.8–10.5)

## 2022-12-22 PROCEDURE — 12345: CPT | Mod: NC

## 2022-12-22 PROCEDURE — 99223 1ST HOSP IP/OBS HIGH 75: CPT

## 2022-12-22 RX ORDER — ALBUTEROL 90 UG/1
2 AEROSOL, METERED ORAL EVERY 6 HOURS
Refills: 0 | Status: DISCONTINUED | OUTPATIENT
Start: 2022-12-22 | End: 2022-12-26

## 2022-12-22 RX ORDER — MOMETASONE FUROATE 220 UG/1
2 INHALANT RESPIRATORY (INHALATION) DAILY
Refills: 0 | Status: DISCONTINUED | OUTPATIENT
Start: 2022-12-22 | End: 2022-12-26

## 2022-12-22 RX ORDER — METHOCARBAMOL 500 MG/1
500 TABLET, FILM COATED ORAL THREE TIMES A DAY
Refills: 0 | Status: DISCONTINUED | OUTPATIENT
Start: 2022-12-22 | End: 2022-12-26

## 2022-12-22 RX ORDER — DIPHENHYDRAMINE HCL 50 MG
25 CAPSULE ORAL ONCE
Refills: 0 | Status: COMPLETED | OUTPATIENT
Start: 2022-12-22 | End: 2022-12-22

## 2022-12-22 RX ORDER — QUETIAPINE FUMARATE 200 MG/1
200 TABLET, FILM COATED ORAL
Refills: 0 | Status: DISCONTINUED | OUTPATIENT
Start: 2022-12-22 | End: 2022-12-26

## 2022-12-22 RX ORDER — OXCARBAZEPINE 300 MG/1
150 TABLET, FILM COATED ORAL
Refills: 0 | Status: DISCONTINUED | OUTPATIENT
Start: 2022-12-22 | End: 2022-12-26

## 2022-12-22 RX ORDER — LANOLIN ALCOHOL/MO/W.PET/CERES
1 CREAM (GRAM) TOPICAL
Qty: 0 | Refills: 0 | DISCHARGE

## 2022-12-22 RX ORDER — ACETAMINOPHEN 500 MG
650 TABLET ORAL EVERY 6 HOURS
Refills: 0 | Status: DISCONTINUED | OUTPATIENT
Start: 2022-12-22 | End: 2022-12-26

## 2022-12-22 RX ORDER — FLUTICASONE PROPIONATE 50 MCG
1 SPRAY, SUSPENSION NASAL
Refills: 0 | Status: DISCONTINUED | OUTPATIENT
Start: 2022-12-22 | End: 2022-12-26

## 2022-12-22 RX ORDER — PANTOPRAZOLE SODIUM 20 MG/1
40 TABLET, DELAYED RELEASE ORAL
Refills: 0 | Status: DISCONTINUED | OUTPATIENT
Start: 2022-12-22 | End: 2022-12-26

## 2022-12-22 RX ORDER — CHLORPROMAZINE HCL 10 MG
50 TABLET ORAL EVERY 8 HOURS
Refills: 0 | Status: DISCONTINUED | OUTPATIENT
Start: 2022-12-22 | End: 2022-12-26

## 2022-12-22 RX ORDER — DIPHENHYDRAMINE HCL 50 MG
1 CAPSULE ORAL
Qty: 0 | Refills: 0 | DISCHARGE

## 2022-12-22 RX ORDER — LORATADINE 10 MG/1
0 TABLET ORAL
Qty: 0 | Refills: 0 | DISCHARGE

## 2022-12-22 RX ORDER — METHOCARBAMOL 500 MG/1
500 TABLET, FILM COATED ORAL ONCE
Refills: 0 | Status: COMPLETED | OUTPATIENT
Start: 2022-12-22 | End: 2022-12-22

## 2022-12-22 RX ORDER — APIXABAN 2.5 MG/1
5 TABLET, FILM COATED ORAL EVERY 12 HOURS
Refills: 0 | Status: DISCONTINUED | OUTPATIENT
Start: 2022-12-22 | End: 2022-12-26

## 2022-12-22 RX ORDER — QUETIAPINE FUMARATE 200 MG/1
50 TABLET, FILM COATED ORAL
Refills: 0 | Status: DISCONTINUED | OUTPATIENT
Start: 2022-12-22 | End: 2022-12-26

## 2022-12-22 RX ORDER — LEVOTHYROXINE SODIUM 125 MCG
75 TABLET ORAL DAILY
Refills: 0 | Status: DISCONTINUED | OUTPATIENT
Start: 2022-12-22 | End: 2022-12-26

## 2022-12-22 RX ORDER — OXYCODONE AND ACETAMINOPHEN 5; 325 MG/1; MG/1
1 TABLET ORAL
Qty: 0 | Refills: 0 | DISCHARGE

## 2022-12-22 RX ORDER — APIXABAN 2.5 MG/1
2.5 TABLET, FILM COATED ORAL EVERY 12 HOURS
Refills: 0 | Status: DISCONTINUED | OUTPATIENT
Start: 2022-12-22 | End: 2022-12-22

## 2022-12-22 RX ORDER — INFLUENZA VIRUS VACCINE 15; 15; 15; 15 UG/.5ML; UG/.5ML; UG/.5ML; UG/.5ML
0.5 SUSPENSION INTRAMUSCULAR ONCE
Refills: 0 | Status: DISCONTINUED | OUTPATIENT
Start: 2022-12-22 | End: 2022-12-26

## 2022-12-22 RX ADMIN — METHOCARBAMOL 500 MILLIGRAM(S): 500 TABLET, FILM COATED ORAL at 16:26

## 2022-12-22 RX ADMIN — APIXABAN 5 MILLIGRAM(S): 2.5 TABLET, FILM COATED ORAL at 17:43

## 2022-12-22 RX ADMIN — APIXABAN 5 MILLIGRAM(S): 2.5 TABLET, FILM COATED ORAL at 06:39

## 2022-12-22 RX ADMIN — MOMETASONE FUROATE 2 PUFF(S): 220 INHALANT RESPIRATORY (INHALATION) at 11:40

## 2022-12-22 RX ADMIN — ALBUTEROL 2 PUFF(S): 90 AEROSOL, METERED ORAL at 15:00

## 2022-12-22 RX ADMIN — Medication 650 MILLIGRAM(S): at 00:06

## 2022-12-22 RX ADMIN — QUETIAPINE FUMARATE 200 MILLIGRAM(S): 200 TABLET, FILM COATED ORAL at 07:19

## 2022-12-22 RX ADMIN — OXCARBAZEPINE 150 MILLIGRAM(S): 300 TABLET, FILM COATED ORAL at 07:19

## 2022-12-22 RX ADMIN — Medication 25 MILLIGRAM(S): at 14:58

## 2022-12-22 RX ADMIN — Medication 1 SPRAY(S): at 17:41

## 2022-12-22 RX ADMIN — QUETIAPINE FUMARATE 200 MILLIGRAM(S): 200 TABLET, FILM COATED ORAL at 21:57

## 2022-12-22 RX ADMIN — Medication 50 MILLIGRAM(S): at 14:58

## 2022-12-22 RX ADMIN — QUETIAPINE FUMARATE 50 MILLIGRAM(S): 200 TABLET, FILM COATED ORAL at 11:39

## 2022-12-22 RX ADMIN — Medication 50 MILLIGRAM(S): at 21:57

## 2022-12-22 RX ADMIN — Medication 25 MILLIGRAM(S): at 02:25

## 2022-12-22 RX ADMIN — METHOCARBAMOL 500 MILLIGRAM(S): 500 TABLET, FILM COATED ORAL at 00:39

## 2022-12-22 RX ADMIN — OXCARBAZEPINE 150 MILLIGRAM(S): 300 TABLET, FILM COATED ORAL at 17:40

## 2022-12-22 RX ADMIN — METHOCARBAMOL 500 MILLIGRAM(S): 500 TABLET, FILM COATED ORAL at 22:16

## 2022-12-22 RX ADMIN — Medication 1 SPRAY(S): at 07:19

## 2022-12-22 RX ADMIN — Medication 50 MILLIGRAM(S): at 07:19

## 2022-12-22 RX ADMIN — Medication 75 MICROGRAM(S): at 06:39

## 2022-12-22 RX ADMIN — PANTOPRAZOLE SODIUM 40 MILLIGRAM(S): 20 TABLET, DELAYED RELEASE ORAL at 07:19

## 2022-12-22 NOTE — H&P ADULT - NSHPPHYSICALEXAM_GEN_ALL_CORE
PHYSICAL EXAM:  GENERAL: NAD, well-developed  HEAD:  Atraumatic, Normocephalic  EYES: EOMI, PERRLA, conjunctiva and sclera clear  NECK: Supple, No JVD  CHEST/LUNG: Clear to auscultation bilaterally; No wheeze  HEART: Regular rate and rhythm; No murmurs, rubs, or gallops  ABDOMEN: Soft, Nontender, Nondistended; Bowel sounds present  EXTREMITIES:  2+ Peripheral Pulses radial and DP, No clubbing, cyanosis, or edema  PSYCH: AAOx3  NEUROLOGY: non-focal  SKIN: No rashes or lesions

## 2022-12-22 NOTE — H&P ADULT - HISTORY OF PRESENT ILLNESS
Patient is a 37 year old F hx of Factor 5 Leiden, prior DVT (2018), PE (1-2022) on Eliquis, bipolar disorder, lifelong disability, seizure w/ psychogenic component who presents w/ calf pain for 2 days. She also has sob w/ exertion, and noted tachycardia to 110s-120s. She pain on the left lower chest, abdominal region. States this feels similar to her prior Pes. In the ED duplex venous was negative for DVT. CT angio was suboptimal. Is not hypoxic on RA. She tried albuterol inhaler without relief. Denies fevers, chills, headaches, visual changes, LE swelling. Denies suicidal ideation, homicidal ideation, or hallucinations.   EKG Qtc 491, no acute ST elevation or pathologic Q waves. Troponin negative.

## 2022-12-22 NOTE — H&P ADULT - PROBLEM SELECTOR PLAN 1
-pt w/ chest pain worse w/ inspiration  -concern for PE given pt's hx of F5L  -CT angio suboptimal, check d-dimer and CT angio PE  -do eliquis 5 mg BID, if d-dimer markedly elevated can do 10 mg BID for 7 d  -troponin negative, EKG nonischemic low concern for ACS  -TTE 8-2022 w normal EF and no structural abnormalities  -was on eliquis 2.5 mg BID w/ pt's outpt hematology -pt w/ chest pain worse w/ inspiration  -concern for PE given pt's hx of F5L  -CT angio suboptimal, check d-dimer and CT angio PE  -do eliquis 5 mg BID, if d-dimer markedly elevated can do 10 mg BID for 7 d  -troponin negative, EKG nonischemic low concern for ACS  -TTE 8-2022 w normal EF and no structural abnormalities  -was on eliquis 2.5 mg BID w/ pt's outpt hematology  -if w/u for PE negative can obtain heme consult for eliquis dosing

## 2022-12-22 NOTE — ED ADULT NURSE REASSESSMENT NOTE - NS ED NURSE REASSESS COMMENT FT1
Pt received at change of shift, respirations even/unlabored, in NAD. Pt endorsing continued pain to LLE s/p Tylenol. Provider ABILIO Amaya made aware. Pt awaiting CT. Bed in lowest position. Will continue to monitor.
Pt taken for CT scan with ID band in place.
Break coverage RN. Patient A&O, resting in stretcher, respirations even and unlabored, vitals as noted, patient endorsing LLE pain, ACP paged, awaiting further orders. IV line intact.  Report given to ESSU 2 ALEYDA Mckeon.
Pt remains in ED, A&Ox4, respirations even/unlabored, in NAD. Pt observed on Facetime, interacting with family member. Robaxin 500 mg administered per provider order. Pt awaiting inpatient bed assignment. No c/o offered at this time. Will continue to monitor.

## 2022-12-22 NOTE — H&P ADULT - PROBLEM SELECTOR PLAN 3
-hx of this  -can do eliquis 5 mg BID for now pending above w/u  -if w/u negative can reduce to 2.5 mg BID -hx of this  -can do eliquis 5 mg BID for now pending above w/u

## 2022-12-22 NOTE — H&P ADULT - PROBLEM SELECTOR PLAN 5
-c/w provent and proair prn    #Bipolar  -c/w home medications  no SI, HI, or hallucinations. -c/w provent and proair prn    #Bipolar  -c/w home medications, reduce thorazine from 4 times a day to 3 times a day given tachycardia, if PE w/u negative can reduce to lower risk of tachycardia  no SI, HI, or hallucinations.

## 2022-12-22 NOTE — H&P ADULT - NSHPLABSRESULTS_GEN_ALL_CORE
LABS:                         11.2   6.21  )-----------( 185      ( 21 Dec 2022 19:20 )             35.2     12-21    138  |  102  |  16  ----------------------------<  83  4.2   |  19<L>  |  0.81    Ca    9.6      21 Dec 2022 19:20    TPro  8.0  /  Alb  4.6  /  TBili  0.2  /  DBili  x   /  AST  18  /  ALT  16  /  AlkPhos  71  12-21    PT/INR - ( 21 Dec 2022 19:20 )   PT: 12.8 sec;   INR: 1.10 ratio         PTT - ( 21 Dec 2022 19:20 )  PTT:20.2 sec

## 2022-12-22 NOTE — PATIENT PROFILE ADULT - FALL HARM RISK - HARM RISK INTERVENTIONS

## 2022-12-22 NOTE — H&P ADULT - ASSESSMENT
Patient is a 37 year old F hx of Factor 5 Leiden, prior DVT (2018), PE (1-2022) on Eliquis, bipolar disorder, lifelong disability, seizure w/ psychogenic component who presents w/ calf pain for 2 days. She also has sob w/ exertion, and noted tachycardia to 110s-120s

## 2022-12-23 LAB
ANION GAP SERPL CALC-SCNC: 11 MMOL/L — SIGNIFICANT CHANGE UP (ref 7–14)
BUN SERPL-MCNC: 12 MG/DL — SIGNIFICANT CHANGE UP (ref 7–23)
CALCIUM SERPL-MCNC: 9 MG/DL — SIGNIFICANT CHANGE UP (ref 8.4–10.5)
CHLORIDE SERPL-SCNC: 104 MMOL/L — SIGNIFICANT CHANGE UP (ref 98–107)
CO2 SERPL-SCNC: 24 MMOL/L — SIGNIFICANT CHANGE UP (ref 22–31)
CREAT SERPL-MCNC: 0.69 MG/DL — SIGNIFICANT CHANGE UP (ref 0.5–1.3)
EGFR: 116 ML/MIN/1.73M2 — SIGNIFICANT CHANGE UP
GLUCOSE SERPL-MCNC: 88 MG/DL — SIGNIFICANT CHANGE UP (ref 70–99)
HCT VFR BLD CALC: 32.1 % — LOW (ref 34.5–45)
HGB BLD-MCNC: 10.5 G/DL — LOW (ref 11.5–15.5)
MAGNESIUM SERPL-MCNC: 1.8 MG/DL — SIGNIFICANT CHANGE UP (ref 1.6–2.6)
MCHC RBC-ENTMCNC: 30 PG — SIGNIFICANT CHANGE UP (ref 27–34)
MCHC RBC-ENTMCNC: 32.7 GM/DL — SIGNIFICANT CHANGE UP (ref 32–36)
MCV RBC AUTO: 91.7 FL — SIGNIFICANT CHANGE UP (ref 80–100)
NRBC # BLD: 0 /100 WBCS — SIGNIFICANT CHANGE UP (ref 0–0)
NRBC # FLD: 0 K/UL — SIGNIFICANT CHANGE UP (ref 0–0)
PHOSPHATE SERPL-MCNC: 3.9 MG/DL — SIGNIFICANT CHANGE UP (ref 2.5–4.5)
PLATELET # BLD AUTO: 223 K/UL — SIGNIFICANT CHANGE UP (ref 150–400)
POTASSIUM SERPL-MCNC: 4.4 MMOL/L — SIGNIFICANT CHANGE UP (ref 3.5–5.3)
POTASSIUM SERPL-SCNC: 4.4 MMOL/L — SIGNIFICANT CHANGE UP (ref 3.5–5.3)
RBC # BLD: 3.5 M/UL — LOW (ref 3.8–5.2)
RBC # FLD: 13.8 % — SIGNIFICANT CHANGE UP (ref 10.3–14.5)
SODIUM SERPL-SCNC: 139 MMOL/L — SIGNIFICANT CHANGE UP (ref 135–145)
WBC # BLD: 4.92 K/UL — SIGNIFICANT CHANGE UP (ref 3.8–10.5)
WBC # FLD AUTO: 4.92 K/UL — SIGNIFICANT CHANGE UP (ref 3.8–10.5)

## 2022-12-23 PROCEDURE — 99233 SBSQ HOSP IP/OBS HIGH 50: CPT

## 2022-12-23 PROCEDURE — 71275 CT ANGIOGRAPHY CHEST: CPT | Mod: 26

## 2022-12-23 RX ORDER — LIDOCAINE 4 G/100G
1 CREAM TOPICAL DAILY
Refills: 0 | Status: DISCONTINUED | OUTPATIENT
Start: 2022-12-23 | End: 2022-12-26

## 2022-12-23 RX ORDER — DIPHENHYDRAMINE HCL 50 MG
25 CAPSULE ORAL ONCE
Refills: 0 | Status: COMPLETED | OUTPATIENT
Start: 2022-12-23 | End: 2022-12-23

## 2022-12-23 RX ADMIN — Medication 50 MILLIGRAM(S): at 13:12

## 2022-12-23 RX ADMIN — Medication 50 MILLIGRAM(S): at 21:02

## 2022-12-23 RX ADMIN — QUETIAPINE FUMARATE 200 MILLIGRAM(S): 200 TABLET, FILM COATED ORAL at 21:02

## 2022-12-23 RX ADMIN — QUETIAPINE FUMARATE 50 MILLIGRAM(S): 200 TABLET, FILM COATED ORAL at 13:12

## 2022-12-23 RX ADMIN — APIXABAN 5 MILLIGRAM(S): 2.5 TABLET, FILM COATED ORAL at 17:28

## 2022-12-23 RX ADMIN — MOMETASONE FUROATE 2 PUFF(S): 220 INHALANT RESPIRATORY (INHALATION) at 09:29

## 2022-12-23 RX ADMIN — PANTOPRAZOLE SODIUM 40 MILLIGRAM(S): 20 TABLET, DELAYED RELEASE ORAL at 05:33

## 2022-12-23 RX ADMIN — Medication 50 MILLIGRAM(S): at 05:34

## 2022-12-23 RX ADMIN — METHOCARBAMOL 500 MILLIGRAM(S): 500 TABLET, FILM COATED ORAL at 08:36

## 2022-12-23 RX ADMIN — Medication 75 MICROGRAM(S): at 05:33

## 2022-12-23 RX ADMIN — METHOCARBAMOL 500 MILLIGRAM(S): 500 TABLET, FILM COATED ORAL at 13:23

## 2022-12-23 RX ADMIN — QUETIAPINE FUMARATE 200 MILLIGRAM(S): 200 TABLET, FILM COATED ORAL at 09:29

## 2022-12-23 RX ADMIN — Medication 1 SPRAY(S): at 17:27

## 2022-12-23 RX ADMIN — METHOCARBAMOL 500 MILLIGRAM(S): 500 TABLET, FILM COATED ORAL at 18:25

## 2022-12-23 RX ADMIN — Medication 25 MILLIGRAM(S): at 21:01

## 2022-12-23 RX ADMIN — APIXABAN 5 MILLIGRAM(S): 2.5 TABLET, FILM COATED ORAL at 05:33

## 2022-12-23 RX ADMIN — OXCARBAZEPINE 150 MILLIGRAM(S): 300 TABLET, FILM COATED ORAL at 05:33

## 2022-12-23 RX ADMIN — Medication 1 SPRAY(S): at 05:36

## 2022-12-23 RX ADMIN — OXCARBAZEPINE 150 MILLIGRAM(S): 300 TABLET, FILM COATED ORAL at 17:27

## 2022-12-24 DIAGNOSIS — I26.99 OTHER PULMONARY EMBOLISM WITHOUT ACUTE COR PULMONALE: ICD-10-CM

## 2022-12-24 DIAGNOSIS — F31.9 BIPOLAR DISORDER, UNSPECIFIED: ICD-10-CM

## 2022-12-24 PROCEDURE — 99232 SBSQ HOSP IP/OBS MODERATE 35: CPT

## 2022-12-24 RX ORDER — DIPHENHYDRAMINE HCL 50 MG
25 CAPSULE ORAL ONCE
Refills: 0 | Status: COMPLETED | OUTPATIENT
Start: 2022-12-24 | End: 2022-12-24

## 2022-12-24 RX ADMIN — Medication 75 MICROGRAM(S): at 05:09

## 2022-12-24 RX ADMIN — QUETIAPINE FUMARATE 200 MILLIGRAM(S): 200 TABLET, FILM COATED ORAL at 08:32

## 2022-12-24 RX ADMIN — Medication 50 MILLIGRAM(S): at 21:08

## 2022-12-24 RX ADMIN — OXCARBAZEPINE 150 MILLIGRAM(S): 300 TABLET, FILM COATED ORAL at 17:40

## 2022-12-24 RX ADMIN — METHOCARBAMOL 500 MILLIGRAM(S): 500 TABLET, FILM COATED ORAL at 06:55

## 2022-12-24 RX ADMIN — LIDOCAINE 1 PATCH: 4 CREAM TOPICAL at 18:22

## 2022-12-24 RX ADMIN — Medication 1 SPRAY(S): at 17:34

## 2022-12-24 RX ADMIN — Medication 50 MILLIGRAM(S): at 05:09

## 2022-12-24 RX ADMIN — METHOCARBAMOL 500 MILLIGRAM(S): 500 TABLET, FILM COATED ORAL at 21:08

## 2022-12-24 RX ADMIN — APIXABAN 5 MILLIGRAM(S): 2.5 TABLET, FILM COATED ORAL at 17:36

## 2022-12-24 RX ADMIN — PANTOPRAZOLE SODIUM 40 MILLIGRAM(S): 20 TABLET, DELAYED RELEASE ORAL at 05:09

## 2022-12-24 RX ADMIN — OXCARBAZEPINE 150 MILLIGRAM(S): 300 TABLET, FILM COATED ORAL at 05:09

## 2022-12-24 RX ADMIN — Medication 1 SPRAY(S): at 05:10

## 2022-12-24 RX ADMIN — APIXABAN 5 MILLIGRAM(S): 2.5 TABLET, FILM COATED ORAL at 05:09

## 2022-12-24 RX ADMIN — QUETIAPINE FUMARATE 200 MILLIGRAM(S): 200 TABLET, FILM COATED ORAL at 20:25

## 2022-12-24 RX ADMIN — Medication 25 MILLIGRAM(S): at 20:25

## 2022-12-24 RX ADMIN — MOMETASONE FUROATE 2 PUFF(S): 220 INHALANT RESPIRATORY (INHALATION) at 08:33

## 2022-12-24 RX ADMIN — Medication 25 MILLIGRAM(S): at 10:10

## 2022-12-24 RX ADMIN — LIDOCAINE 1 PATCH: 4 CREAM TOPICAL at 12:59

## 2022-12-24 RX ADMIN — METHOCARBAMOL 500 MILLIGRAM(S): 500 TABLET, FILM COATED ORAL at 15:08

## 2022-12-24 RX ADMIN — QUETIAPINE FUMARATE 50 MILLIGRAM(S): 200 TABLET, FILM COATED ORAL at 12:03

## 2022-12-25 LAB
ANION GAP SERPL CALC-SCNC: 13 MMOL/L — SIGNIFICANT CHANGE UP (ref 7–14)
BUN SERPL-MCNC: 13 MG/DL — SIGNIFICANT CHANGE UP (ref 7–23)
CALCIUM SERPL-MCNC: 9.2 MG/DL — SIGNIFICANT CHANGE UP (ref 8.4–10.5)
CHLORIDE SERPL-SCNC: 103 MMOL/L — SIGNIFICANT CHANGE UP (ref 98–107)
CO2 SERPL-SCNC: 22 MMOL/L — SIGNIFICANT CHANGE UP (ref 22–31)
CREAT SERPL-MCNC: 0.68 MG/DL — SIGNIFICANT CHANGE UP (ref 0.5–1.3)
EGFR: 116 ML/MIN/1.73M2 — SIGNIFICANT CHANGE UP
GLUCOSE SERPL-MCNC: 88 MG/DL — SIGNIFICANT CHANGE UP (ref 70–99)
HCT VFR BLD CALC: 34.1 % — LOW (ref 34.5–45)
HGB BLD-MCNC: 11 G/DL — LOW (ref 11.5–15.5)
MAGNESIUM SERPL-MCNC: 1.8 MG/DL — SIGNIFICANT CHANGE UP (ref 1.6–2.6)
MCHC RBC-ENTMCNC: 30 PG — SIGNIFICANT CHANGE UP (ref 27–34)
MCHC RBC-ENTMCNC: 32.3 GM/DL — SIGNIFICANT CHANGE UP (ref 32–36)
MCV RBC AUTO: 92.9 FL — SIGNIFICANT CHANGE UP (ref 80–100)
NRBC # BLD: 0 /100 WBCS — SIGNIFICANT CHANGE UP (ref 0–0)
NRBC # FLD: 0 K/UL — SIGNIFICANT CHANGE UP (ref 0–0)
PHOSPHATE SERPL-MCNC: 4.1 MG/DL — SIGNIFICANT CHANGE UP (ref 2.5–4.5)
PLATELET # BLD AUTO: 211 K/UL — SIGNIFICANT CHANGE UP (ref 150–400)
POTASSIUM SERPL-MCNC: 4.2 MMOL/L — SIGNIFICANT CHANGE UP (ref 3.5–5.3)
POTASSIUM SERPL-SCNC: 4.2 MMOL/L — SIGNIFICANT CHANGE UP (ref 3.5–5.3)
RBC # BLD: 3.67 M/UL — LOW (ref 3.8–5.2)
RBC # FLD: 13.9 % — SIGNIFICANT CHANGE UP (ref 10.3–14.5)
SODIUM SERPL-SCNC: 138 MMOL/L — SIGNIFICANT CHANGE UP (ref 135–145)
WBC # BLD: 6.42 K/UL — SIGNIFICANT CHANGE UP (ref 3.8–10.5)
WBC # FLD AUTO: 6.42 K/UL — SIGNIFICANT CHANGE UP (ref 3.8–10.5)

## 2022-12-25 PROCEDURE — 99232 SBSQ HOSP IP/OBS MODERATE 35: CPT

## 2022-12-25 RX ORDER — MAGNESIUM SULFATE 500 MG/ML
1 VIAL (ML) INJECTION ONCE
Refills: 0 | Status: COMPLETED | OUTPATIENT
Start: 2022-12-25 | End: 2022-12-25

## 2022-12-25 RX ORDER — DIPHENHYDRAMINE HCL 50 MG
25 CAPSULE ORAL ONCE
Refills: 0 | Status: COMPLETED | OUTPATIENT
Start: 2022-12-25 | End: 2022-12-25

## 2022-12-25 RX ADMIN — Medication 100 GRAM(S): at 11:12

## 2022-12-25 RX ADMIN — Medication 1 SPRAY(S): at 05:05

## 2022-12-25 RX ADMIN — Medication 650 MILLIGRAM(S): at 10:01

## 2022-12-25 RX ADMIN — METHOCARBAMOL 500 MILLIGRAM(S): 500 TABLET, FILM COATED ORAL at 13:06

## 2022-12-25 RX ADMIN — Medication 25 MILLIGRAM(S): at 19:47

## 2022-12-25 RX ADMIN — QUETIAPINE FUMARATE 200 MILLIGRAM(S): 200 TABLET, FILM COATED ORAL at 08:35

## 2022-12-25 RX ADMIN — QUETIAPINE FUMARATE 50 MILLIGRAM(S): 200 TABLET, FILM COATED ORAL at 12:05

## 2022-12-25 RX ADMIN — MOMETASONE FUROATE 2 PUFF(S): 220 INHALANT RESPIRATORY (INHALATION) at 10:02

## 2022-12-25 RX ADMIN — PANTOPRAZOLE SODIUM 40 MILLIGRAM(S): 20 TABLET, DELAYED RELEASE ORAL at 08:36

## 2022-12-25 RX ADMIN — APIXABAN 5 MILLIGRAM(S): 2.5 TABLET, FILM COATED ORAL at 05:04

## 2022-12-25 RX ADMIN — METHOCARBAMOL 500 MILLIGRAM(S): 500 TABLET, FILM COATED ORAL at 21:04

## 2022-12-25 RX ADMIN — QUETIAPINE FUMARATE 200 MILLIGRAM(S): 200 TABLET, FILM COATED ORAL at 21:03

## 2022-12-25 RX ADMIN — OXCARBAZEPINE 150 MILLIGRAM(S): 300 TABLET, FILM COATED ORAL at 17:25

## 2022-12-25 RX ADMIN — METHOCARBAMOL 500 MILLIGRAM(S): 500 TABLET, FILM COATED ORAL at 05:05

## 2022-12-25 RX ADMIN — Medication 650 MILLIGRAM(S): at 11:00

## 2022-12-25 RX ADMIN — Medication 50 MILLIGRAM(S): at 13:04

## 2022-12-25 RX ADMIN — Medication 50 MILLIGRAM(S): at 21:03

## 2022-12-25 RX ADMIN — Medication 1 SPRAY(S): at 17:25

## 2022-12-25 RX ADMIN — Medication 75 MICROGRAM(S): at 05:04

## 2022-12-25 RX ADMIN — OXCARBAZEPINE 150 MILLIGRAM(S): 300 TABLET, FILM COATED ORAL at 05:04

## 2022-12-25 RX ADMIN — APIXABAN 5 MILLIGRAM(S): 2.5 TABLET, FILM COATED ORAL at 17:26

## 2022-12-25 RX ADMIN — Medication 25 MILLIGRAM(S): at 11:11

## 2022-12-25 RX ADMIN — Medication 50 MILLIGRAM(S): at 05:04

## 2022-12-25 NOTE — PROGRESS NOTE ADULT - PROBLEM SELECTOR PLAN 6
-protonix daily  -maalox prn

## 2022-12-25 NOTE — PROGRESS NOTE ADULT - PROBLEM SELECTOR PROBLEM 7
Bipolar disorder
Nutrition, metabolism, and development symptoms
Nutrition, metabolism, and development symptoms
Bipolar disorder

## 2022-12-25 NOTE — PROGRESS NOTE ADULT - ASSESSMENT
Patient is a 37 year old F hx of Factor 5 Leiden, prior DVT (2018), PE (1-2022) on Eliquis, bipolar disorder, lifelong disability, seizure w/ psychogenic component who presents w/ calf pain for 2 days. She also has sob w/ exertion, and noted tachycardia to 110s-120s
Patient is a 37 year old F hx of Factor 5 Leiden, prior DVT (2018), PE (1-2022) on Eliquis, bipolar disorder, lifelong disability, seizure w/ psychogenic component who presents w/ calf pain for 2 days. She also has sob w/ exertion, and noted tachycardia to 110s-120s
Patient is a 37 year old F hx of Factor 5 Leiden, prior DVT (2018), PE (1-2022) on Eliquis, bipolar disorder, lifelong disability, seizure w/ psychogenic component who presents w/ calf pain for 2 days. PE ruled out via CTA.
Patient is a 37 year old F hx of Factor 5 Leiden, prior DVT (2018), PE (1-2022) on Eliquis, bipolar disorder, lifelong disability, seizure w/ psychogenic component who presents w/ calf pain for 2 days. PE ruled out via CTA.

## 2022-12-25 NOTE — PROGRESS NOTE ADULT - PROBLEM SELECTOR PLAN 1
-pt w/ chest pain worse w/ inspiration  -concern for PE given pt's hx of F5L  -CT angio suboptimal, d-dimer elevated to 281.  -repeat CT angio PE pending  -c/w eliquis 5 mg BID  -troponin negative, EKG nonischemic low concern for ACS  -TTE 8-2022 w normal EF and no structural abnormalities  -was on eliquis 2.5 mg BID w/ pt's outpt hematology  -repeat CTA without any evidence of PE
-pt w/ chest pain worse w/ inspiration  -concern for PE given pt's hx of F5L  -CT angio suboptimal, d-dimer elevated to 281.  -troponin negative, EKG nonischemic low concern for ACS  -TTE 8-2022 w normal EF and no structural abnormalities  -was on eliquis 2.5 mg BID w/ pt's outpt hematology, but will c/w Eliquis 5 bid  -repeat CTA without any evidence of PE
-pt w/ chest pain worse w/ inspiration  -concern for PE given pt's hx of F5L  -CT angio suboptimal, d-dimer elevated to 281.  -repeat CT angio PE pending  -c/w eliquis 5 mg BID  -troponin negative, EKG nonischemic low concern for ACS  -TTE 8-2022 w normal EF and no structural abnormalities  -was on eliquis 2.5 mg BID w/ pt's outpt hematology  -if w/u for PE negative can obtain heme consult for eliquis dosing
-pt w/ chest pain worse w/ inspiration  -concern for PE given pt's hx of F5L  -CT angio suboptimal, d-dimer elevated to 281.  -troponin negative, EKG nonischemic low concern for ACS  -TTE 8-2022 w normal EF and no structural abnormalities  -was on eliquis 2.5 mg BID w/ pt's outpt hematology, but will c/w Eliquis 5 bid  -repeat CTA without any evidence of PE

## 2022-12-25 NOTE — PROGRESS NOTE ADULT - PROBLEM SELECTOR PLAN 7
-c/w home medications, reduce thorazine from 4 times a day to 3 times a day given tachycardia, if PE w/u negative can reduce to lower risk of tachycardia  no SI, HI, or hallucinations.
DVT: Eliquis  Diet: DASH  Dispo: back to group home once medically optimized
DVT: Eliquis  Diet: DASH  Dispo: back to group home once medically optimized
-c/w home medications, reduce thorazine from 4 times a day to 3 times a day given tachycardia, if PE w/u negative can reduce to lower risk of tachycardia  no SI, HI, or hallucinations.

## 2022-12-25 NOTE — PROGRESS NOTE ADULT - PROBLEM SELECTOR PLAN 2
-duplex venous negative  -pulses intact  -likely 2/2 MSK pain  -tylenol for mild pain  -c/w methocarbomol 500mg TID PRN for muscle spasm
-duplex venous negative  -pulses intact  -likely 2/2 MSK pain  -tylenol for mild pain  -c/w methocarbomol 500mg TID PRN for muscle spasm  -will also add on lidocaine patch for likely MSK pain
-duplex venous negative  -pulses intact  -likely 2/2 MSK pain  -tylenol for mild pain  -c/w methocarbomol 500mg TID PRN for muscle spasm  -c/w lidocaine patch for likely MSK pain
-duplex venous negative  -pulses intact  -likely 2/2 MSK pain  -tylenol for mild pain  -c/w methocarbomol 500mg TID PRN for muscle spasm  -c/w lidocaine patch for likely MSK pain

## 2022-12-25 NOTE — PROGRESS NOTE ADULT - PROBLEM SELECTOR PLAN 3
-hx of this  -can do eliquis 5 mg BID for now   -as there is no evidence of PE/DVT, will likely switch back to 2.5mg BID on discharge
-hx DVT and PE x2  -Eliquis as above  -Per patient, her hematologist would like her on AC for life given Factor V Leiden
-hx DVT and PE x2  -Eliquis as above  -Per patient, her hematologist would like her on AC for life given Factor V Leiden  -would c/w eliquis 5 BID on d/c
-hx of this  -can do eliquis 5 mg BID for now pending above w/u

## 2022-12-25 NOTE — PROGRESS NOTE ADULT - SUBJECTIVE AND OBJECTIVE BOX
Yes
[facility] of [facility] Medicine  Robert Parker MD  Available on MS Teams  Pager: [phone]    Patient is a 37y old  Female who presents with a chief complaint of Left calf pain, cp/sob (22 Dec 2022 03:18)    OVERNIGHT EVENTS: No acute events overnight    SUBJECTIVE: Patient seen and examined at bedside this morning. States that she has some left shin/calf discomfort. States it was improved with robaxin that she received earlier. Also endorses intermittent feeling of palpitations which self resolve. Otherwise denies any CP, SOB, dizziness, nausea/vomiting or LE edema.    ADDITIONAL REVIEW OF SYSTEMS: as above.    MEDICATIONS  (STANDING):  apixaban 5 milliGRAM(s) Oral every 12 hours  chlorproMAZINE    Tablet 50 milliGRAM(s) Oral every 8 hours  fluticasone propionate 50 MICROgram(s)/spray Nasal Spray 1 Spray(s) Both Nostrils two times a day  levothyroxine 75 MICROGram(s) Oral daily  mometasone 220 MICROgram(s) Inhaler 2 Puff(s) Inhalation daily  OXcarbazepine 150 milliGRAM(s) Oral two times a day  pantoprazole    Tablet 40 milliGRAM(s) Oral before breakfast  QUEtiapine 200 milliGRAM(s) Oral <User Schedule>  QUEtiapine 50 milliGRAM(s) Oral <User Schedule>    MEDICATIONS  (PRN):  acetaminophen     Tablet .. 650 milliGRAM(s) Oral every 6 hours PRN Temp greater or equal to 38C (100.4F), Mild Pain (1 - 3)  albuterol    90 MICROgram(s) HFA Inhaler 2 Puff(s) Inhalation every 6 hours PRN Shortness of Breath and/or Wheezing  aluminum hydroxide/magnesium hydroxide/simethicone Suspension 30 milliLiter(s) Oral every 6 hours PRN Dyspepsia  LORazepam     Tablet 0.5 milliGRAM(s) Oral every 12 hours PRN Anxiety  methocarbamol 500 milliGRAM(s) Oral three times a day PRN Muscle Spasm    CAPILLARY BLOOD GLUCOSE    I&O's Summary    PHYSICAL EXAM:  Vital Signs Last 24 Hrs  T(C): 36.4 (22 Dec 2022 12:30), Max: 36.9 (22 Dec 2022 04:19)  T(F): 97.5 (22 Dec 2022 12:30), Max: 98.4 (22 Dec 2022 04:19)  HR: 96 (22 Dec 2022 12:30) (91 - 101)  BP: 116/82 (22 Dec 2022 14:08) (100/60 - 124/87)  BP(mean): --  RR: 16 (22 Dec 2022 14:08) (16 - 20)  SpO2: 94% (22 Dec 2022 14:08) (94% - 100%)    Parameters below as of 22 Dec 2022 12:30  Patient On (Oxygen Delivery Method): room air    CONSTITUTIONAL: NAD, well-developed  HEAD: Normocephalic, atraumatic  EYES: EOMI, PERRL  ENT: no rhinorrhea, no pharyngeal erythema  RESPIRATORY: No increased work of breathing, CTAB, no wheezes or crackles appreciated  CARDIOVASCULAR: RRR, S1 and S2 present, no m/r/g  ABDOMEN: soft, NT, ND, bowel sounds present  EXTREMITIES: No LE edema or warmth noted  MUSCULOSKELETAL: no joint swelling, no tenderness to palpation  NEURO: A&Ox3, moving all extremities    LABS:                        10.7   5.87  )-----------( 220      ( 22 Dec 2022 05:25 )             32.9     12-22    138  |  103  |  14  ----------------------------<  86  3.8   |  24  |  0.68    Ca    9.4      22 Dec 2022 05:25  Phos  3.3     12-22  Mg     1.80     12-22    TPro  7.6  /  Alb  4.3  /  TBili  0.3  /  DBili  x   /  AST  14  /  ALT  19  /  AlkPhos  66  12-22    PT/INR - ( 21 Dec 2022 19:20 )   PT: 12.8 sec;   INR: 1.10 ratio      PTT - ( 21 Dec 2022 19:20 )  PTT:20.2 sec    RADIOLOGY & ADDITIONAL TESTS:  < from: CT Angio Chest PE Protocol w/ IV Cont (12.21.22 @ 22:42) >  IMPRESSION:    Nondiagnostic exam due to lack of opacification of the pulmonary   arteries.. If clinical suspicion for pulmonary embolus remains high   consider repeat scan or VQ scan.    < end of copied text >      Results Reviewed:   Imaging Personally Reviewed:  Electrocardiogram Personally Reviewed:    COORDINATION OF CARE:  Care Discussed with Consultants/Other Providers [Y/N]:  Prior or Outpatient Records Reviewed [Y/N]:  
[facility]  Robert Parker MD  Available on MS Teams  Pager: [phone]    Patient is a 37y old  Female who presents with a chief complaint of Left calf pain, cp/sob (22 Dec 2022 17:33)    OVERNIGHT EVENTS: No acute events overnight.    SUBJECTIVE: Pt seen and examined at bedside this morning. Reports having had brief episode of palpitations earlier in the morning which self resolved. Continues to endorse left calf pain. Otherwise denies any leg swelling. Has been ambulating without issue.    ADDITIONAL REVIEW OF SYSTEMS: as above.    MEDICATIONS  (STANDING):  apixaban 5 milliGRAM(s) Oral every 12 hours  chlorproMAZINE    Tablet 50 milliGRAM(s) Oral every 8 hours  fluticasone propionate 50 MICROgram(s)/spray Nasal Spray 1 Spray(s) Both Nostrils two times a day  influenza   Vaccine 0.5 milliLiter(s) IntraMuscular once  levothyroxine 75 MICROGram(s) Oral daily  mometasone 220 MICROgram(s) Inhaler 2 Puff(s) Inhalation daily  OXcarbazepine 150 milliGRAM(s) Oral two times a day  pantoprazole    Tablet 40 milliGRAM(s) Oral before breakfast  QUEtiapine 200 milliGRAM(s) Oral <User Schedule>  QUEtiapine 50 milliGRAM(s) Oral <User Schedule>    MEDICATIONS  (PRN):  acetaminophen     Tablet .. 650 milliGRAM(s) Oral every 6 hours PRN Temp greater or equal to 38C (100.4F), Mild Pain (1 - 3)  albuterol    90 MICROgram(s) HFA Inhaler 2 Puff(s) Inhalation every 6 hours PRN Shortness of Breath and/or Wheezing  aluminum hydroxide/magnesium hydroxide/simethicone Suspension 30 milliLiter(s) Oral every 6 hours PRN Dyspepsia  LORazepam     Tablet 0.5 milliGRAM(s) Oral every 12 hours PRN Anxiety  methocarbamol 500 milliGRAM(s) Oral three times a day PRN Muscle Spasm    CAPILLARY BLOOD GLUCOSE    I&O's Summary    PHYSICAL EXAM:  Vital Signs Last 24 Hrs  T(C): 36.6 (23 Dec 2022 13:14), Max: 36.8 (22 Dec 2022 20:43)  T(F): 97.8 (23 Dec 2022 13:14), Max: 98.3 (22 Dec 2022 20:43)  HR: 104 (23 Dec 2022 13:14) (84 - 104)  BP: 111/71 (23 Dec 2022 13:14) (106/64 - 115/83)  BP(mean): --  RR: 18 (23 Dec 2022 13:14) (16 - 18)  SpO2: 100% (23 Dec 2022 13:14) (97% - 100%)    Parameters below as of 23 Dec 2022 13:14  Patient On (Oxygen Delivery Method): room air    CONSTITUTIONAL: NAD, well-developed  HEAD: Normocephalic, atraumatic  EYES: EOMI, PERRL  ENT: no rhinorrhea, no pharyngeal erythema  RESPIRATORY: No increased work of breathing, CTAB, no wheezes or crackles appreciated  CARDIOVASCULAR: RRR, S1 and S2 present, no m/r/g  ABDOMEN: soft, NT, ND, bowel sounds present  EXTREMITIES: No LE edema, left calf/shin with TTP  MUSCULOSKELETAL: no joint swelling, no tenderness to palpation  NEURO: A&Ox3, moving all extremities    LABS:                        10.5   4.92  )-----------( 223      ( 23 Dec 2022 06:15 )             32.1     12-23    139  |  104  |  12  ----------------------------<  88  4.4   |  24  |  0.69    Ca    9.0      23 Dec 2022 06:15  Phos  3.9     12-23  Mg     1.80     12-23    TPro  7.6  /  Alb  4.3  /  TBili  0.3  /  DBili  x   /  AST  14  /  ALT  19  /  AlkPhos  66  12-22    PT/INR - ( 21 Dec 2022 19:20 )   PT: 12.8 sec;   INR: 1.10 ratio      PTT - ( 21 Dec 2022 19:20 )  PTT:20.2 sec            RADIOLOGY & ADDITIONAL TESTS:    Results Reviewed:   Imaging Personally Reviewed:  Electrocardiogram Personally Reviewed:    COORDINATION OF CARE:  Care Discussed with Consultants/Other Providers [Y/N]:  Prior or Outpatient Records Reviewed [Y/N]:  
Patient is a 37y old  Female who presents with a chief complaint of Left calf pain, cp/sob (23 Dec 2022 15:06)      INTERVAL HPI/OVERNIGHT EVENTS: TYSHAWN overnight. This am, endorsing muscle spams in her L calf.       REVIEW OF SYSTEMS:    CONSTITUTIONAL: No weakness, fevers or chills  EYES/ENT: No visual changes;  No vertigo or throat pain   NECK: No pain or stiffness  RESPIRATORY: No cough, wheezing, hemoptysis; No shortness of breath  CARDIOVASCULAR: No chest pain or palpitations  GASTROINTESTINAL: No abdominal or epigastric pain. No nausea, vomiting, or hematemesis; No diarrhea or constipation. No melena or hematochezia.  GENITOURINARY: No dysuria, frequency or hematuria  NEUROLOGICAL: No numbness or weakness  MSK: + L calf pain  All other review of systems is negative unless indicated above.    FAMILY HISTORY:  Family history of DVT (Mother)      T(C): 36.9 (12-24-22 @ 05:00), Max: 36.9 (12-24-22 @ 05:00)  HR: 82 (12-24-22 @ 05:00) (82 - 108)  BP: 125/84 (12-24-22 @ 05:00) (111/71 - 162/88)  RR: 18 (12-24-22 @ 05:00) (17 - 18)  SpO2: 98% (12-24-22 @ 05:00) (97% - 100%)  Wt(kg): --Vital Signs Last 24 Hrs  T(C): 36.9 (24 Dec 2022 05:00), Max: 36.9 (24 Dec 2022 05:00)  T(F): 98.4 (24 Dec 2022 05:00), Max: 98.4 (24 Dec 2022 05:00)  HR: 82 (24 Dec 2022 05:00) (82 - 108)  BP: 125/84 (24 Dec 2022 05:00) (111/71 - 162/88)  BP(mean): --  RR: 18 (24 Dec 2022 05:00) (17 - 18)  SpO2: 98% (24 Dec 2022 05:00) (97% - 100%)    Parameters below as of 24 Dec 2022 05:00  Patient On (Oxygen Delivery Method): room air        PHYSICAL EXAM:  CONSTITUTIONAL: NAD, well-developed  HEAD: Normocephalic, atraumatic  EYES: EOMI, PERRL  ENT: no rhinorrhea, no pharyngeal erythema  RESPIRATORY: No increased work of breathing, CTAB, no wheezes or crackles appreciated  CARDIOVASCULAR: RRR, S1 and S2 present, no m/r/g  ABDOMEN: soft, NT, ND, bowel sounds present  EXTREMITIES: No LE edema, left calf/shin with TTP  MUSCULOSKELETAL: no joint swelling, no tenderness to palpation  NEURO: A&Ox3, moving all extremities    Consultant(s) Notes Reviewed:  [x ] YES  [ ] NO  Care Discussed with Consultants/Other Providers [ x] YES  [ ] NO    LABS:      Ca    9.0        23 Dec 2022 06:15        CAPILLARY BLOOD GLUCOSE        BLOOD CULTURE      RADIOLOGY & ADDITIONAL TESTS:    Imaging Personally Reviewed:  [ ] YES  [ ] NO  acetaminophen     Tablet .. 650 milliGRAM(s) Oral every 6 hours PRN  albuterol    90 MICROgram(s) HFA Inhaler 2 Puff(s) Inhalation every 6 hours PRN  aluminum hydroxide/magnesium hydroxide/simethicone Suspension 30 milliLiter(s) Oral every 6 hours PRN  apixaban 5 milliGRAM(s) Oral every 12 hours  chlorproMAZINE    Tablet 50 milliGRAM(s) Oral every 8 hours  fluticasone propionate 50 MICROgram(s)/spray Nasal Spray 1 Spray(s) Both Nostrils two times a day  influenza   Vaccine 0.5 milliLiter(s) IntraMuscular once  levothyroxine 75 MICROGram(s) Oral daily  lidocaine   4% Patch 1 Patch Transdermal daily PRN  LORazepam     Tablet 0.5 milliGRAM(s) Oral every 12 hours PRN  methocarbamol 500 milliGRAM(s) Oral three times a day PRN  mometasone 220 MICROgram(s) Inhaler 2 Puff(s) Inhalation daily  OXcarbazepine 150 milliGRAM(s) Oral two times a day  pantoprazole    Tablet 40 milliGRAM(s) Oral before breakfast  QUEtiapine 200 milliGRAM(s) Oral <User Schedule>  QUEtiapine 50 milliGRAM(s) Oral <User Schedule>      HEALTH ISSUES - PROBLEM Dx:  Factor V Leiden    Asthma    GERD (gastroesophageal reflux disease)    DVT, lower extremity    Chest pain    Calf pain    Nutrition, metabolism, and development symptoms          
[facility]  Robert Parker MD  Available on MS Teams  Pager: [phone]    Patient is a 37y old  Female who presents with a chief complaint of Left calf pain, cp/sob (24 Dec 2022 09:16)    OVERNIGHT EVENTS: No acute events overnight.    SUBJECTIVE: Pt seen and examined at bedside this morning. States that she feels well. Denies any palpitations today. Denies any CP, SOB, or abd pain. Continues to endorse occasional left calf pain.    ADDITIONAL REVIEW OF SYSTEMS: as above.    MEDICATIONS  (STANDING):  apixaban 5 milliGRAM(s) Oral every 12 hours  chlorproMAZINE    Tablet 50 milliGRAM(s) Oral every 8 hours  fluticasone propionate 50 MICROgram(s)/spray Nasal Spray 1 Spray(s) Both Nostrils two times a day  influenza   Vaccine 0.5 milliLiter(s) IntraMuscular once  levothyroxine 75 MICROGram(s) Oral daily  mometasone 220 MICROgram(s) Inhaler 2 Puff(s) Inhalation daily  OXcarbazepine 150 milliGRAM(s) Oral two times a day  pantoprazole    Tablet 40 milliGRAM(s) Oral before breakfast  QUEtiapine 200 milliGRAM(s) Oral <User Schedule>  QUEtiapine 50 milliGRAM(s) Oral <User Schedule>    MEDICATIONS  (PRN):  acetaminophen     Tablet .. 650 milliGRAM(s) Oral every 6 hours PRN Temp greater or equal to 38C (100.4F), Mild Pain (1 - 3)  albuterol    90 MICROgram(s) HFA Inhaler 2 Puff(s) Inhalation every 6 hours PRN Shortness of Breath and/or Wheezing  aluminum hydroxide/magnesium hydroxide/simethicone Suspension 30 milliLiter(s) Oral every 6 hours PRN Dyspepsia  lidocaine   4% Patch 1 Patch Transdermal daily PRN Calf pain  LORazepam     Tablet 0.5 milliGRAM(s) Oral every 12 hours PRN Anxiety  methocarbamol 500 milliGRAM(s) Oral three times a day PRN Muscle Spasm    CAPILLARY BLOOD GLUCOSE    I&O's Summary    PHYSICAL EXAM:  Vital Signs Last 24 Hrs  T(C): 36.9 (25 Dec 2022 11:00), Max: 36.9 (25 Dec 2022 05:00)  T(F): 98.5 (25 Dec 2022 11:00), Max: 98.5 (25 Dec 2022 11:00)  HR: 111 (25 Dec 2022 13:02) (89 - 115)  BP: 105/72 (25 Dec 2022 13:02) (101/55 - 127/81)  BP(mean): --  RR: 18 (25 Dec 2022 13:02) (18 - 18)  SpO2: 99% (25 Dec 2022 11:00) (98% - 99%)    Parameters below as of 25 Dec 2022 11:00  Patient On (Oxygen Delivery Method): room air    CONSTITUTIONAL: NAD, well-developed  HEAD: Normocephalic, atraumatic  EYES: EOMI, PERRL  ENT: no rhinorrhea, no pharyngeal erythema  RESPIRATORY: No increased work of breathing, CTAB, no wheezes or crackles appreciated  CARDIOVASCULAR: RRR, S1 and S2 present, no m/r/g  ABDOMEN: soft, NT, ND, bowel sounds present  EXTREMITIES: No LE edema, left calf/shin with TTP  MUSCULOSKELETAL: no joint swelling, no tenderness to palpation  NEURO: A&Ox3, moving all extremities    LABS:                        11.0   6.42  )-----------( 211      ( 25 Dec 2022 06:30 )             34.1     12-25    138  |  103  |  13  ----------------------------<  88  4.2   |  22  |  0.68    Ca    9.2      25 Dec 2022 06:30  Phos  4.1     12-25  Mg     1.80     12-25    RADIOLOGY & ADDITIONAL TESTS:    Results Reviewed:   Imaging Personally Reviewed:  Electrocardiogram Personally Reviewed:    COORDINATION OF CARE:  Care Discussed with Consultants/Other Providers [Y/N]:  Prior or Outpatient Records Reviewed [Y/N]:

## 2022-12-25 NOTE — PROGRESS NOTE ADULT - PROBLEM SELECTOR PLAN 8
DVT: Eliquis  Diet: DASH  Dispo: back to group home once medically optimized
DVT: Eliquis  Diet: DASH  Dispo: patient now with negative workup for PE, tolerating full dose AC with eliquis, otherwise optimized for d/c back to group home once placed.

## 2022-12-25 NOTE — PROGRESS NOTE ADULT - PROBLEM SELECTOR PLAN 5
-c/w provent and proair prn    #Bipolar  -c/w home medications, reduce thorazine from 4 times a day to 3 times a day given tachycardia, if PE w/u negative can reduce to lower risk of tachycardia  no SI, HI, or hallucinations.
-c/w provent and proair prn    #Bipolar  -c/w home medications, reduce thorazine from 4 times a day to 3 times a day given tachycardia, if PE w/u negative can reduce to lower risk of tachycardia  no SI, HI, or hallucinations.
-c/w provent and proair prn
-c/w provent and proair prn

## 2022-12-26 ENCOUNTER — TRANSCRIPTION ENCOUNTER (OUTPATIENT)
Age: 37
End: 2022-12-26

## 2022-12-26 VITALS
DIASTOLIC BLOOD PRESSURE: 60 MMHG | RESPIRATION RATE: 18 BRPM | HEART RATE: 122 BPM | SYSTOLIC BLOOD PRESSURE: 133 MMHG | OXYGEN SATURATION: 98 % | TEMPERATURE: 98 F

## 2022-12-26 PROCEDURE — 99239 HOSP IP/OBS DSCHRG MGMT >30: CPT

## 2022-12-26 RX ORDER — LIDOCAINE 4 G/100G
1 CREAM TOPICAL
Qty: 30 | Refills: 0
Start: 2022-12-26 | End: 2023-01-24

## 2022-12-26 RX ORDER — DIPHENHYDRAMINE HCL 50 MG
25 CAPSULE ORAL ONCE
Refills: 0 | Status: COMPLETED | OUTPATIENT
Start: 2022-12-26 | End: 2022-12-26

## 2022-12-26 RX ORDER — QUETIAPINE FUMARATE 200 MG/1
1 TABLET, FILM COATED ORAL
Qty: 30 | Refills: 0
Start: 2022-12-26 | End: 2023-01-24

## 2022-12-26 RX ORDER — PANTOPRAZOLE SODIUM 20 MG/1
1 TABLET, DELAYED RELEASE ORAL
Qty: 0 | Refills: 0 | DISCHARGE

## 2022-12-26 RX ORDER — MOMETASONE FUROATE 220 UG/1
2 INHALANT RESPIRATORY (INHALATION)
Qty: 1 | Refills: 0
Start: 2022-12-26

## 2022-12-26 RX ORDER — LIDOCAINE 4 G/100G
1 CREAM TOPICAL DAILY
Refills: 0 | Status: DISCONTINUED | OUTPATIENT
Start: 2022-12-26 | End: 2022-12-26

## 2022-12-26 RX ORDER — OXYCODONE HYDROCHLORIDE 5 MG/1
5 TABLET ORAL ONCE
Refills: 0 | Status: DISCONTINUED | OUTPATIENT
Start: 2022-12-26 | End: 2022-12-26

## 2022-12-26 RX ORDER — LEVOTHYROXINE SODIUM 125 MCG
1 TABLET ORAL
Qty: 30 | Refills: 0
Start: 2022-12-26 | End: 2023-01-24

## 2022-12-26 RX ORDER — QUETIAPINE FUMARATE 200 MG/1
1 TABLET, FILM COATED ORAL
Qty: 0 | Refills: 0 | DISCHARGE

## 2022-12-26 RX ORDER — L.ACIDOPH/B.ANIMALIS/B.LONGUM 15B CELL
1 CAPSULE ORAL
Qty: 0 | Refills: 0 | DISCHARGE

## 2022-12-26 RX ORDER — ALBUTEROL 90 UG/1
2 AEROSOL, METERED ORAL
Qty: 240 | Refills: 0
Start: 2022-12-26 | End: 2023-01-24

## 2022-12-26 RX ORDER — APIXABAN 2.5 MG/1
1 TABLET, FILM COATED ORAL
Qty: 0 | Refills: 0 | DISCHARGE

## 2022-12-26 RX ORDER — FLUTICASONE PROPIONATE 220 MCG
1 AEROSOL WITH ADAPTER (GRAM) INHALATION
Qty: 0 | Refills: 0 | DISCHARGE

## 2022-12-26 RX ORDER — CHLORPROMAZINE HCL 10 MG
1 TABLET ORAL
Qty: 90 | Refills: 0
Start: 2022-12-26 | End: 2023-01-24

## 2022-12-26 RX ORDER — PANTOPRAZOLE SODIUM 20 MG/1
1 TABLET, DELAYED RELEASE ORAL
Qty: 30 | Refills: 0
Start: 2022-12-26 | End: 2023-01-24

## 2022-12-26 RX ORDER — OXCARBAZEPINE 300 MG/1
1 TABLET, FILM COATED ORAL
Qty: 60 | Refills: 0
Start: 2022-12-26 | End: 2023-01-24

## 2022-12-26 RX ORDER — METHOCARBAMOL 500 MG/1
1 TABLET, FILM COATED ORAL
Qty: 30 | Refills: 0
Start: 2022-12-26 | End: 2023-01-04

## 2022-12-26 RX ORDER — FLUTICASONE PROPIONATE 50 MCG
1 SPRAY, SUSPENSION NASAL
Qty: 1 | Refills: 0
Start: 2022-12-26 | End: 2023-01-24

## 2022-12-26 RX ORDER — APIXABAN 2.5 MG/1
1 TABLET, FILM COATED ORAL
Qty: 60 | Refills: 0
Start: 2022-12-26 | End: 2023-01-24

## 2022-12-26 RX ADMIN — OXYCODONE HYDROCHLORIDE 5 MILLIGRAM(S): 5 TABLET ORAL at 10:28

## 2022-12-26 RX ADMIN — METHOCARBAMOL 500 MILLIGRAM(S): 500 TABLET, FILM COATED ORAL at 12:50

## 2022-12-26 RX ADMIN — QUETIAPINE FUMARATE 50 MILLIGRAM(S): 200 TABLET, FILM COATED ORAL at 11:59

## 2022-12-26 RX ADMIN — PANTOPRAZOLE SODIUM 40 MILLIGRAM(S): 20 TABLET, DELAYED RELEASE ORAL at 05:34

## 2022-12-26 RX ADMIN — Medication 1 SPRAY(S): at 06:08

## 2022-12-26 RX ADMIN — Medication 650 MILLIGRAM(S): at 15:26

## 2022-12-26 RX ADMIN — MOMETASONE FUROATE 2 PUFF(S): 220 INHALANT RESPIRATORY (INHALATION) at 10:29

## 2022-12-26 RX ADMIN — QUETIAPINE FUMARATE 200 MILLIGRAM(S): 200 TABLET, FILM COATED ORAL at 08:39

## 2022-12-26 RX ADMIN — Medication 75 MICROGRAM(S): at 05:35

## 2022-12-26 RX ADMIN — OXCARBAZEPINE 150 MILLIGRAM(S): 300 TABLET, FILM COATED ORAL at 05:34

## 2022-12-26 RX ADMIN — APIXABAN 5 MILLIGRAM(S): 2.5 TABLET, FILM COATED ORAL at 05:33

## 2022-12-26 RX ADMIN — METHOCARBAMOL 500 MILLIGRAM(S): 500 TABLET, FILM COATED ORAL at 05:34

## 2022-12-26 RX ADMIN — OXYCODONE HYDROCHLORIDE 5 MILLIGRAM(S): 5 TABLET ORAL at 11:30

## 2022-12-26 RX ADMIN — Medication 25 MILLIGRAM(S): at 16:10

## 2022-12-26 RX ADMIN — Medication 25 MILLIGRAM(S): at 10:28

## 2022-12-26 RX ADMIN — Medication 50 MILLIGRAM(S): at 05:34

## 2022-12-26 NOTE — DISCHARGE NOTE PROVIDER - HOSPITAL COURSE
Patient is a 37 year old F hx of Factor 5 Leiden, prior DVT (2018), PE (1-2022) on Eliquis, bipolar disorder, lifelong disability, seizure w/ psychogenic component who presents w/ calf pain for 2 days. PE ruled out via CTA.     Problem/Plan - 1:  ·  Problem: Chest pain.   ·  Plan: -pt w/ chest pain worse w/ inspiration  -concern for PE given pt's hx of F5L  -CT angio suboptimal, d-dimer elevated to 281.  -troponin negative, EKG nonischemic low concern for ACS  -TTE 8-2022 w normal EF and no structural abnormalities  -was on eliquis 2.5 mg BID w/ pt's outpt hematology, but will c/w Eliquis 5 bid  -repeat CTA without any evidence of PE.     Problem/Plan - 2:  ·  Problem: Calf pain.   ·  Plan: -duplex venous negative  -pulses intact  -likely 2/2 MSK pain  -tylenol for mild pain  -c/w methocarbomol 500mg TID PRN for muscle spasm  -c/w lidocaine patch for likely MSK pain.     Problem/Plan - 3:  ·  Problem: Pulmonary embolism.   ·  Plan: -hx DVT and PE x2  -Eliquis as above  -Per patient, her hematologist would like her on AC for life given Factor V Leiden  -would c/w eliquis 5 BID on d/c.     Problem/Plan - 4:  ·  Problem: Factor V Leiden.   ·  Plan: -as above.     Problem/Plan - 5:  ·  Problem: Asthma.   ·  Plan: -c/w provent and proair prn.     Problem/Plan - 6:  ·  Problem: GERD (gastroesophageal reflux disease).   ·  Plan: -protonix daily  -maalox prn.     Problem/Plan - 7:  ·  Problem: Bipolar disorder.   ·  Plan: -c/w home medications, reduce thorazine from 4 times a day to 3 times a day given tachycardia, if PE w/u negative can reduce to lower risk of tachycardia  no SI, HI, or hallucinations.     Problem/Plan - 8:  ·  Problem: Nutrition, metabolism, and development symptoms.   ·  Plan: DVT: Eliquis  Diet: DASH  Dispo: patient now with negative workup for PE, tolerating full dose AC with eliquis, otherwise optimized for d/c back to group home once placed.

## 2022-12-26 NOTE — DISCHARGE NOTE PROVIDER - NSDCMRMEDTOKEN_GEN_ALL_CORE_FT
albuterol 90 mcg/inh inhalation aerosol: 2 puff(s) inhaled every 6 hours, As needed, Shortness of Breath and/or Wheezing  apixaban 5 mg oral tablet: 1 tab(s) orally every 12 hours  chlorproMAZINE 50 mg oral tablet: 1 tab(s) orally every 8 hours  fluticasone 50 mcg/inh nasal spray: 1 spray(s) nasal 2 times a day  levothyroxine 75 mcg (0.075 mg) oral tablet: 1 tab(s) orally once a day  lidocaine 4% topical film: Apply topically to affected area once a day  LORazepam 0.5 mg oral tablet: 1 tab(s) orally every 12 hours, As needed, Anxiety MDD:1 mg  methocarbamol 500 mg oral tablet: 1 tab(s) orally 3 times a day, As needed, Muscle Spasm  mometasone 220 mcg/inh inhalation aerosol powder: 2 puff(s) inhaled once a day  pantoprazole 40 mg oral delayed release tablet: 1 tab(s) orally once a day  QUEtiapine 200 mg oral tablet: 1 tab(s) orally once a day   QUEtiapine 50 mg oral tablet: 1 tab(s) orally once a day   Trileptal 150 mg oral tablet: 1 tab(s) orally 2 times a day MDD:300mg

## 2022-12-26 NOTE — DISCHARGE NOTE PROVIDER - NSDCFUSCHEDAPPT_GEN_ALL_CORE_FT
Denis English  [facility] Physician [facility]  NEUROLOGY [address] [facility]  Scheduled Appointment: 01/18/2023    Raoul Weller  [facility]  CARDIOLOGY [phone]  Scheduled Appointment: 02/14/2023

## 2022-12-26 NOTE — DISCHARGE NOTE PROVIDER - ATTENDING DISCHARGE PHYSICAL EXAMINATION:
Patient seen and examined on day of discharge (12/26/22). Patient endorsing neck pain this morning that she attributes to her bed. States there is some soreness with turning her head. Otherwise denies any palpitations, CP, SOB, fever, or nausea/vomiting. Patient admitted for SOB and palpitations with concern for tachycardia. DVT/PE studies negative and patient placed back on Eliquis 5mg BID. Symptoms resolved spontaneously. Patient without any events noted on telemetry other than sinus tachycardia with movement. She is medically optimized for discharge with PCP and hematology follow up.    PHYSICAL EXAM:  GENERAL: NAD, lying in bed comfortably  HEENT: NC/AT, EOMI, PERRL  NECK: TTP on right posterior neck, no erythema  CHEST/LUNG: CTAB, no increased WOB  HEART: RRR, no m/r/g  ABDOMEN: soft, NT, ND, BS+  EXTREMITIES:  2+ peripheral pulses, no clubbing, no edema  NERVOUS SYSTEM:  A&Ox3, no focal deficits  MSK: FROM all 4 extremities, full and equal strength  SKIN: No rashes or lesions

## 2022-12-26 NOTE — DISCHARGE NOTE NURSING/CASE MANAGEMENT/SOCIAL WORK - PATIENT PORTAL LINK FT
You can access the FollowMyHealth Patient Portal offered by [facility] by registering at the following website: [email or web address]. By joining [employer]’s FollowMyHealth portal, you will also be able to view your health information using other applications (apps) compatible with our system.

## 2022-12-26 NOTE — DISCHARGE NOTE PROVIDER - CARE PROVIDER_API CALL
Rohan Braxton  INTERNAL MEDICINE  [address] [place] [address]  Phone: [phone]  Fax: [phone]  Follow Up Time: 2 weeks

## 2022-12-26 NOTE — DISCHARGE NOTE PROVIDER - PREFACE STATEMENT FOR MINUTES SPENT
Encounter Date: 7/25/2017    SCRIBE #1 NOTE: I, Yudi Wall, am scribing for, and in the presence of, Dr. Douglas.       History     Chief Complaint   Patient presents with    Chest Pain     SOB and CP x3 days, hx of COPD, intermittent leg numbness     Time seen by provider: 4:22 PM    This is a 59 y.o. male with COPD who presents with complaint of intermittent CP x 2 months. CP is not exacerbated with exertion. Pt notes accompanying SOB on exertion (beyond baseline), mild dizziness, bilateral leg pain below the knee (greater in the plantar region), generalized fatigue, intermittent nausea, and intermittent wheezing. His symptoms have been gradually increasing in severity since onset. He in unable to walk half a block without having to stop due to SOB, he was able to ambulate with ease 6 months ago. Pt states that his blood pressure has been higher than the usual 110/60 for the past 3 days. Pt quit smoking last year. He has no hx of blood clots. Pt has never undergone a stress test. He does not have a PCP at the moment. He has been trying to see Dr. Majano, pulmonologist, for the past 6 weeks without success. He denies LOC, abdominal pain, diarrhea, palpitations, leg swelling, fever, diaphoresis, numbness, and cough.      The history is provided by the patient and the spouse.     Review of patient's allergies indicates:  No Known Allergies  Past Medical History:   Diagnosis Date    Cluster headaches     COPD (chronic obstructive pulmonary disease)      Past Surgical History:   Procedure Laterality Date    SHOULDER SURGERY Left     TONSILLECTOMY       History reviewed. No pertinent family history.  Social History   Substance Use Topics    Smoking status: Former Smoker     Packs/day: 0.00    Smokeless tobacco: Never Used      Comment: quit cigarrettes 1 year ago, vapes daily     Alcohol use Yes      Comment: rarely     Review of Systems   Constitutional: Positive for fatigue. Negative for chills,  diaphoresis and fever.   HENT: Negative for ear discharge, ear pain and sore throat.    Eyes: Negative for photophobia, pain and visual disturbance.   Respiratory: Positive for shortness of breath and wheezing. Negative for cough.    Cardiovascular: Positive for chest pain. Negative for palpitations and leg swelling.   Gastrointestinal: Positive for nausea. Negative for abdominal pain and diarrhea.   Genitourinary: Negative for dysuria and urgency.   Musculoskeletal: Negative for back pain and neck pain.        Bilateral leg pain knees and below.   Skin: Negative for color change, pallor, rash and wound.   Neurological: Positive for dizziness. Negative for syncope, numbness and headaches.   Hematological: Does not bruise/bleed easily.   Psychiatric/Behavioral: Negative for behavioral problems and confusion.       Physical Exam     Initial Vitals   BP Pulse Resp Temp SpO2   -- -- -- -- --      MAP       --         Physical Exam    Nursing note and vitals reviewed.  Constitutional: He appears well-developed and well-nourished. No distress.   HENT:   Head: Normocephalic and atraumatic.   Eyes: Conjunctivae and EOM are normal. Pupils are equal, round, and reactive to light.   Neck: Normal range of motion. Neck supple.   Cardiovascular: Normal rate and normal heart sounds.   Pulmonary/Chest: Effort normal and breath sounds normal.   Abdominal: Soft. Normal appearance and bowel sounds are normal. There is no tenderness.   Musculoskeletal: Normal range of motion.   Neurological: He is alert and oriented to person, place, and time. He has normal strength. No cranial nerve deficit or sensory deficit.   Skin: Skin is warm and dry.   Psychiatric: He has a normal mood and affect. His behavior is normal. Thought content normal.         ED Course   Procedures  Labs Reviewed   CBC W/ AUTO DIFFERENTIAL - Abnormal; Notable for the following:        Result Value    RBC 4.05 (*)     Hemoglobin 13.8 (*)      (*)     MCH 34.1 (*)      MPV 8.9 (*)     All other components within normal limits   BASIC METABOLIC PANEL - Abnormal; Notable for the following:     Calcium 8.6 (*)     All other components within normal limits   MAGNESIUM   TROPONIN I     EKG Readings: (Independently Interpreted)   NSR at 62 bpm. Nrm axis. Nrm intervals. No STEMI.       X-Rays:   Independently Interpreted Readings:   Chest X-Ray: No acute abnormalities. No obvious pneumonia. No cardiomegaly.       Imaging Results          X-Ray Chest PA And Lateral (Final result)  Result time 07/25/17 17:12:53    Final result by Anand Arredondo MD (07/25/17 17:12:53)                 Impression:     No acute findings.         Electronically signed by: Anand Arredondo MD  Date:     07/25/17  Time:    17:12              Narrative:    Comparison: 9/30/15     Results: Two view chest obtained.  The lungs are clear. The cardiomediastinal contour is within normal limits. No findings to suggest pleural effusions. No pneumothorax.                              Medical Decision Making:   Initial Assessment:   Urgent eval of 60 yo M former smoker w COPD here with multiple concerns of progressive 2 months SOB, + intermittent non exertional chest pain , +SOBworse with exertion, generalized fatigue and B leg pain. Pt denies sputum production, no fevers. Pt reports being unable to obtain follow up with a PCP given his former Dr retired. Here pt is well appearing, no resp distress, pain free on my exam. Low suspicion for ACS, COPD exaccerbation, pna, anemia.       Clinical Tests:   Lab Tests: Ordered and Reviewed  Radiological Study: Ordered and Reviewed  Medical Tests: Ordered and Reviewed  ED Management:  Heart and BYRON scores deemed low risk for ischemic dz/serious cardiac risk at this time. Will plan to dc home w short steroid course and strict follow up PCP.     Additional MDM:   EKG: I have independently interpreted EKG(s) - see notes.   X-Rays: I have independently interpreted X-Ray(s) - see notes.    Heart Score:    History:          Slightly suspicious.  ECG:             Normal  Age:               Less than 45 years  Risk factors: no risk factors known  Troponin:       Less than or equal to normal limit  Final Score: 0      BYRON Score:   Age over 65:                                    0.00   > or = to 3 CAD risk factors:           0.00  Established CAD:                            0.00  > or = to 2 anginal events in the past 24 hours: 1.00  Use of ASA in past 7 days:              0.00  Elevated Enzymes:                         0.00  ST Depression > or = to 0.05 mV:  0.00  BYORN score: 1         Scribe Attestation:   Scribe #1: I performed the above scribed service and the documentation accurately describes the services I performed. I attest to the accuracy of the note.    Attending Attestation:           Physician Attestation for Scribe:  Physician Attestation Statement for Scribe #1: I, Dr. Douglas, reviewed documentation, as scribed by Yudi Wall in my presence, and it is both accurate and complete.                 ED Course     Clinical Impression:     1. Chronic obstructive pulmonary disease, unspecified COPD type    2. Chest pain          Disposition:   Disposition: Discharged  Condition: Stable                        Katelyn Douglas MD  07/25/17 3349     I personally spent

## 2022-12-27 RX ORDER — MOMETASONE FUROATE 220 UG/1
2 INHALANT RESPIRATORY (INHALATION)
Qty: 1 | Refills: 0
Start: 2022-12-27

## 2022-12-28 ENCOUNTER — EMERGENCY (EMERGENCY)
Facility: HOSPITAL | Age: 37
LOS: 1 days | Discharge: HOME | End: 2022-12-28
Admitting: STUDENT IN AN ORGANIZED HEALTH CARE EDUCATION/TRAINING PROGRAM
Payer: MEDICARE

## 2022-12-28 ENCOUNTER — NON-APPOINTMENT (OUTPATIENT)
Age: 37
End: 2022-12-28

## 2022-12-28 VITALS
RESPIRATION RATE: 15 BRPM | DIASTOLIC BLOOD PRESSURE: 83 MMHG | HEART RATE: 108 BPM | OXYGEN SATURATION: 100 % | SYSTOLIC BLOOD PRESSURE: 119 MMHG | HEIGHT: 68 IN | TEMPERATURE: 98 F

## 2022-12-28 PROCEDURE — 99284 EMERGENCY DEPT VISIT MOD MDM: CPT

## 2022-12-28 NOTE — ED ADULT TRIAGE NOTE - CHIEF COMPLAINT QUOTE
presents C/O generalized abd. and dysuria. x1 day. No complaints of chest pain, headache, nausea, dizziness, vomiting  SOB, fever, chills verbalized. pmhx seizure asthma dm htn

## 2022-12-28 NOTE — ED CLERICAL - NS ED CARE COORDINATION INFORMATION
This patient is eligible for (or currently enrolled in) an outpatient care management program available through [email or web address]. This program can coordinate outpatient follow up and assist the patient in accessing a variety of outpatient resources.  If discharged from the ED, the patient will be contacted to see if any additional resources are needed.                                                                                    Please call the Nurse Clinical Call Center at [phone] with any questions or for assistance in discharge planning.

## 2022-12-29 ENCOUNTER — NON-APPOINTMENT (OUTPATIENT)
Age: 37
End: 2022-12-29

## 2022-12-29 VITALS
OXYGEN SATURATION: 98 % | SYSTOLIC BLOOD PRESSURE: 132 MMHG | TEMPERATURE: 98 F | DIASTOLIC BLOOD PRESSURE: 86 MMHG | RESPIRATION RATE: 16 BRPM | HEART RATE: 100 BPM

## 2022-12-29 LAB
APPEARANCE UR: ABNORMAL
BACTERIA # UR AUTO: ABNORMAL
BILIRUB UR-MCNC: NEGATIVE — SIGNIFICANT CHANGE UP
COLOR SPEC: YELLOW — SIGNIFICANT CHANGE UP
DIFF PNL FLD: NEGATIVE — SIGNIFICANT CHANGE UP
EPI CELLS # UR: 27 /HPF — HIGH (ref 0–5)
GLUCOSE UR QL: NEGATIVE — SIGNIFICANT CHANGE UP
HYALINE CASTS # UR AUTO: 1 /LPF — SIGNIFICANT CHANGE UP (ref 0–7)
KETONES UR-MCNC: ABNORMAL
LEUKOCYTE ESTERASE UR-ACNC: NEGATIVE — SIGNIFICANT CHANGE UP
NITRITE UR-MCNC: NEGATIVE — SIGNIFICANT CHANGE UP
PH UR: 6.5 — SIGNIFICANT CHANGE UP (ref 5–8)
PROT UR-MCNC: ABNORMAL
RBC CASTS # UR COMP ASSIST: 15 /HPF — HIGH (ref 0–4)
SP GR SPEC: 1.04 — SIGNIFICANT CHANGE UP (ref 1.01–1.05)
UROBILINOGEN FLD QL: ABNORMAL
WBC UR QL: 6 /HPF — HIGH (ref 0–5)

## 2022-12-29 RX ORDER — DIPHENHYDRAMINE HCL 50 MG
25 CAPSULE ORAL ONCE
Refills: 0 | Status: COMPLETED | OUTPATIENT
Start: 2022-12-29 | End: 2022-12-29

## 2022-12-29 RX ADMIN — Medication 25 MILLIGRAM(S): at 02:21

## 2022-12-29 NOTE — ED ADULT NURSE NOTE - OBJECTIVE STATEMENT
Received the patient in intake 13 with c/o generalized abdominal pain, pelvic pain and dysuria. Not in acute distress. respirations are even and non labored. Safety maintained. alert and oriented x 4. due meds given. Urine sample sent.

## 2022-12-29 NOTE — ED PROVIDER NOTE - OBJECTIVE STATEMENT
37 year old F hx of Factor 5 Leiden, prior DVT (2018), PE (1-2022) on Eliquis, bipolar disorder, lifelong disability, seizure w/ psychogenic component p/w dysuria x 1 day and L calf pain x few days. Pt recently admitted for calf pain and PE r/o. pt had negative w/u at that time. Still having some calf pain, had negative DVT study during admission. Pt reports some suprapubic pressure tonight and episode of dysuria. No back pain. no fever, chills, n/v/d.

## 2022-12-29 NOTE — ED PROVIDER NOTE - PATIENT PORTAL LINK FT
Name: Yuriy Velarde      : 1971      MRN: [id number]  Encounter Provider: Adi Bruno MD  Encounter Date: 2022   Encounter department: [address] [place] [address]     1  Moderate persistent asthma without complication  Assessment & Plan:  Controlled  Continue flovent, serevent and zafirlukast  Use albuterol rarely  Orders:  -     CBC; Future; Expected date: 2023  -     Comprehensive metabolic panel; Future; Expected date: 2023  -     Hemoglobin A1C; Future; Expected date: 2023  -     Lipid panel; Future; Expected date: 2023  -     TSH, 3rd generation with Free T4 reflex; Future; Expected date: 2023    2  Impaired fasting glucose  Assessment & Plan:  2022 hgA1C 5 6 normal  Low carb diet  Orders:  -     CBC; Future; Expected date: 2023  -     Comprehensive metabolic panel; Future; Expected date: 2023  -     Hemoglobin A1C; Future; Expected date: 2023  -     Lipid panel; Future; Expected date: 2023  -     TSH, 3rd generation with Free T4 reflex; Future; Expected date: 2023    3  Gastroesophageal reflux disease without esophagitis  Assessment & Plan:  Controlled  Continue pepcid 40mg QD  Orders:  -     CBC; Future; Expected date: 2023  -     Comprehensive metabolic panel; Future; Expected date: 2023  -     Hemoglobin A1C; Future; Expected date: 2023  -     Lipid panel; Future; Expected date: 2023  -     TSH, 3rd generation with Free T4 reflex; Future; Expected date: 2023    4  Subclinical hypothyroidism  -     CBC; Future; Expected date: 2023  -     Comprehensive metabolic panel; Future; Expected date: 2023  -     Hemoglobin A1C; Future; Expected date: 2023  -     Lipid panel; Future; Expected date: 2023  -     TSH, 3rd generation with Free T4 reflex; Future; Expected date: 2023    5   Other iron deficiency anemia  Assessment & Plan:  2022 Iron normal  Continue iron supplement  6  Morbid obesity with BMI of 50 0-59 9, adult ([place])    7  Encounter for immunization  -     Pneumococcal Conjugate Vaccine 20-valent (PCV20)  -     influenza vaccine, quadrivalent, recombinant, PF, 0 5 mL, for patients 18 yr+ (FLUBLOK)    8  Annual physical exam      Reviewed lab in 12/2022  Iron ok  hgA1C 5 6 normal  CBC ok  CMP ok      Flu shot yearly    Got Covid19 shots and booster     Give PCV20 today  Will make an appt for mammogram  Aunt had breast cancer when she was 61years old    PAG 1264  Will make an appt with OBGYN    Refused colonoscopy  Got Colrosa and will do it per pt  RTO in 6 months          Subjective      HPI     Pt is here by herself  Asthma---controlled well  Use albuterol rarely  She is on flovent bid, serevent bid and zafirlukast 20mg bid  Use allegra as needed if changing weather      GERD---Hx of ulcer when she was 20  She is on OTC pepcid 40mg QD which helped  She only use pantoprazole as needed       IFG---Tried to follow low carb diet       Iron deficiency anemia---Pt is on iron pill QD  Denies side effects        Denies smoking, alcohol or drugs       Denies depression        Review of Systems   Constitutional: Negative for appetite change, chills and fever  HENT: Negative for congestion, ear pain, sinus pain and sore throat  Eyes: Negative for discharge and itching  Respiratory: Negative for apnea, cough, chest tightness, shortness of breath and wheezing  Cardiovascular: Negative for chest pain, palpitations and leg swelling  Gastrointestinal: Negative for abdominal pain, anal bleeding, constipation, diarrhea, nausea and vomiting  Endocrine: Negative for cold intolerance, heat intolerance and polyuria  Genitourinary: Negative for difficulty urinating and dysuria  Musculoskeletal: Negative for arthralgias, back pain and myalgias  Skin: Negative for rash  Neurological: Negative for dizziness and headaches  Psychiatric/Behavioral: Negative for agitation  Current Outpatient Medications on File Prior to Visit   Medication Sig   • albuterol (PROVENTIL HFA,VENTOLIN HFA) 90 mcg/act inhaler Inhale 2 puffs every 6 (six) hours as needed for wheezing   • B Complex-C-Folic Acid (SUPER B-COMPLEX/VIT C/FA PO) Take by mouth   • Cholecalciferol (VITAMIN D3) 2000 units capsule Take by mouth   • famotidine (PEPCID) 40 MG tablet Take 1 tablet (40 mg total) by mouth daily   • ferrous sulfate 325 (65 Fe) mg tablet Take 1 tablet by mouth 2 (two) times a day   • fexofenadine (ALLEGRA) 180 MG tablet Take by mouth   • Flovent  MCG/ACT inhaler Inhale 2 puffs  in the morning and 2 puffs in the evening  Rinse mouth after use  • salmeterol (Serevent Diskus) 50 mcg/dose diskus inhaler Inhale 1 puff  in the morning and 1 puff in the evening  • Serevent Diskus 50 MCG/DOSE diskus inhaler INHALE 1 PUFF IN THE MORNING AND 1 PUFF IN THE EVENING  • zafirlukast (ACCOLATE) 20 MG tablet TAKE 1 TABLET BY MOUTH TWICE A DAY   • [DISCONTINUED] methocarbamol (ROBAXIN) 750 mg tablet Take 1 tablet (750 mg total) by mouth every 6 (six) hours as needed for muscle spasms   • [DISCONTINUED] pantoprazole (PROTONIX) 40 mg tablet TAKE 1 TABLET BY MOUTH EVERY DAY       Objective     /80 (BP Location: Left arm, Patient Position: Sitting, Cuff Size: Large)   Pulse 60   Temp (!) 97 2 °F (36 2 °C) (Tympanic)   Resp 20   Ht 5' 3 5" (1 613 m)   Wt 135 kg (298 lb 3 2 oz)   SpO2 100%   BMI 52 00 kg/m²     Physical Exam  Constitutional:       General: She is not in acute distress  Appearance: She is well-developed  HENT:      Head: Normocephalic  Eyes:      General:         Right eye: No discharge  Left eye: No discharge  Conjunctiva/sclera: Conjunctivae normal    Neck:      Thyroid: No thyromegaly  Cardiovascular:      Rate and Rhythm: Normal rate and regular rhythm  Heart sounds: Normal heart sounds   No murmur heard     No friction rub  No gallop  Pulmonary:      Effort: Pulmonary effort is normal  No respiratory distress  Breath sounds: Normal breath sounds  No wheezing or rales  Chest:      Chest wall: No tenderness  Abdominal:      General: Bowel sounds are normal  There is no distension  Palpations: Abdomen is soft  There is no mass  Tenderness: There is no abdominal tenderness  There is no guarding or rebound  Musculoskeletal:         General: No tenderness or deformity  Normal range of motion  Cervical back: Normal range of motion  Lymphadenopathy:      Cervical: No cervical adenopathy  Neurological:      Mental Status: She is alert         Rick Wong MD You can access the FollowMyHealth Patient Portal offered by [facility] by registering at the following website: [email or web address]. By joining [employer]’s FollowMyHealth portal, you will also be able to view your health information using other applications (apps) compatible with our system.

## 2022-12-29 NOTE — ED PROVIDER NOTE - PATIENT PORTAL LINK FT
Patient is being referred to a hand specialist  Please schedule accordingly      [address] [place] [address] Drive   [phone]
You can access the FollowMyHealth Patient Portal offered by [facility] by registering at the following website: [email or web address]. By joining [employer]’s FollowMyHealth portal, you will also be able to view your health information using other applications (apps) compatible with our system.

## 2022-12-29 NOTE — ED PROVIDER NOTE - CLINICAL SUMMARY MEDICAL DECISION MAKING FREE TEXT BOX
37 year old F hx of Factor 5 Leiden, prior DVT (2018), PE (1-2022) on Eliquis, bipolar disorder, lifelong disability, seizure w/ psychogenic component p/w dysuria x 1 day and L calf pain x few days. Pt recently admitted for calf pain and PE r/o. pt had negative w/u at that time. Still having some calf pain, had negative DVT study during admission. Pt reports some suprapubic pressure tonight and episode of dysuria. No back pain. no fever, chills, n/v/d.   plan  - ua/cx  - pain control

## 2022-12-29 NOTE — ED PROVIDER NOTE - NSICDXFAMILYHX_GEN_ALL_CORE_FT
Left message on parents voicemail. Only 1 phone number in the chart.Arcelia Kay MA/[facility]OCIO     FAMILY HISTORY:  Mother  Still living? Unknown  Family history of DVT, Age at diagnosis: Age Unknown

## 2022-12-29 NOTE — PROVIDER CONTACT NOTE (OTHER) - ASSESSMENT
Called residence [phone]; spoke with Ms. [place], who states that pt travels via ambulette or ambulance.  Pt is requesting a taxi-also discussed pt's request with Ms. Brito-Ms. Brito called manager, and was informed that pt cannot travel via taxi and should travel via ambulette or ambulance.  Discussed with provider and pt; pt aware of the need for travel via above noted mode of transport.  Will arrange ambulance; provider in agreement. Verbal huddle complete.

## 2022-12-29 NOTE — ED ADULT NURSE REASSESSMENT NOTE - NS ED NURSE REASSESS COMMENT FT1
Pt a&ox4, ambulatory, endorses tingling sensation to her mouth after eating popcorn. Pt states she is allergic to certain types of popcorn. Pt reassessed, no signs of redness, swelling, rash, or angioedema noted to the mouth of other parts of the body. Pt speaking in clear and coherent sentences. JORGE L Palma made aware, pt medicated as per orders. Respirations are even and unlabored, no signs of respiratory distress.

## 2022-12-29 NOTE — PROVIDER CONTACT NOTE (OTHER) - SITUATION
Notified by provider that pt is ready for discharge, needs transportation back to her group home residence.

## 2022-12-30 LAB
CULTURE RESULTS: SIGNIFICANT CHANGE UP
SPECIMEN SOURCE: SIGNIFICANT CHANGE UP

## 2022-12-31 NOTE — ED POST DISCHARGE NOTE - RESULT SUMMARY
JORGE L Parekh: UCX contaminated, pt c/o dysuria. Pt called, no answer, left msg to return our call. English

## 2023-01-01 ENCOUNTER — EMERGENCY (EMERGENCY)
Facility: HOSPITAL | Age: 38
LOS: 1 days | Discharge: HOME | End: 2023-01-01
Attending: EMERGENCY MEDICINE | Admitting: EMERGENCY MEDICINE
Payer: MEDICARE

## 2023-01-01 VITALS
DIASTOLIC BLOOD PRESSURE: 80 MMHG | HEIGHT: 68 IN | RESPIRATION RATE: 18 BRPM | TEMPERATURE: 98 F | HEART RATE: 107 BPM | SYSTOLIC BLOOD PRESSURE: 112 MMHG | OXYGEN SATURATION: 100 %

## 2023-01-01 PROCEDURE — 99285 EMERGENCY DEPT VISIT HI MDM: CPT

## 2023-01-01 RX ORDER — HALOPERIDOL DECANOATE 100 MG/ML
5 INJECTION INTRAMUSCULAR ONCE
Refills: 0 | Status: DISCONTINUED | OUTPATIENT
Start: 2023-01-01 | End: 2023-01-02

## 2023-01-01 RX ORDER — ACETAMINOPHEN 500 MG
975 TABLET ORAL ONCE
Refills: 0 | Status: COMPLETED | OUTPATIENT
Start: 2023-01-01 | End: 2023-01-01

## 2023-01-01 RX ORDER — OXYCODONE HYDROCHLORIDE 5 MG/1
5 TABLET ORAL ONCE
Refills: 0 | Status: DISCONTINUED | OUTPATIENT
Start: 2023-01-01 | End: 2023-01-01

## 2023-01-01 RX ORDER — MIDAZOLAM HYDROCHLORIDE 1 MG/ML
4 INJECTION, SOLUTION INTRAMUSCULAR; INTRAVENOUS ONCE
Refills: 0 | Status: DISCONTINUED | OUTPATIENT
Start: 2023-01-01 | End: 2023-01-02

## 2023-01-01 RX ORDER — DIPHENHYDRAMINE HCL 50 MG
50 CAPSULE ORAL ONCE
Refills: 0 | Status: DISCONTINUED | OUTPATIENT
Start: 2023-01-01 | End: 2023-01-02

## 2023-01-01 RX ORDER — METHOCARBAMOL 500 MG/1
500 TABLET, FILM COATED ORAL ONCE
Refills: 0 | Status: COMPLETED | OUTPATIENT
Start: 2023-01-01 | End: 2023-01-01

## 2023-01-01 RX ADMIN — OXYCODONE HYDROCHLORIDE 5 MILLIGRAM(S): 5 TABLET ORAL at 23:49

## 2023-01-01 RX ADMIN — Medication 975 MILLIGRAM(S): at 23:49

## 2023-01-01 NOTE — ED PROVIDER NOTE - PROGRESS NOTE DETAILS
Pierre Saba MD PGY1: Urine neg, upt neg. Pt with mild improvement following analgesia. Discussed with pt discharge with analgesia instructions, pcp follow up, return precautions. Pt understands and is amenable at this time. -Neelam Downing D.O: pt still awaiting pick-up as there was issue with initial pick-up time, patient is starting to complain of pain again, has otherwise been calm and cooperative, last dose of oxycodone at 11pm will administer second dose. SW working on p/u

## 2023-01-01 NOTE — ED CLERICAL - NS ED CLERK NOTE PRE-ARRIVAL INFORMATION; ADDITIONAL PRE-ARRIVAL INFORMATION
This patient is enrolled in a [facility] readmission reduction program and has active care navigation. This patient can be followed up by the care navigation team within 24 hours. To arrange close follow-up or to obtain additional clinical information about this patient, please call the contact number above.     Please consider CDU for management if appropriate.

## 2023-01-01 NOTE — ED ADULT NURSE NOTE - OBJECTIVE STATEMENT
Pt is alert and orientedx4, ambulatory at baseline. Pt presents to the ED with chest pain and pain in left leg (pt has hx of DVT on Eliquis 5mg BID). Pt is sinus tachy on the cardiac monitor.  Pt denies shortness of breath, dyspnea on exertion, breathing is unlabored and even. Pt has +2 edema B/L lower extremities with palpable pulses. Pt denies nausea, vomiting or diarrhea. 20G IV placed in left AC, labs drawn, awaiting further orders. Call bell within reach, bed in lowest position, will continue to monitor.

## 2023-01-01 NOTE — ED PROVIDER NOTE - CLINICAL SUMMARY MEDICAL DECISION MAKING FREE TEXT BOX
37 year old F hx of Factor 5 Leiden, prior DVT (2018), PE (1-2022) on Eliquis, bipolar disorder, lifelong disability, seizure w/ psychogenic component p/w chest pain, abd pain, and L leg pain. Subjectively complaining of stable chest pain from prior visit, diffuse abdominal pain without complaints of dysuria or vaginal changes, chronic left leg pain unchanged from prior visit.  Patient denies any new shortness of breath or hemoptysis, palpitations.  Denies fevers or sick contacts.  Objectively patient with tachycardia to 106, afebrile with other vitals stable.  Exam with reproducible chest pain, nonfocal abdominal exam, left leg tenderness with no overlying skin changes or edema.  Symptoms are likely to represent chronic pain for which she has been seen in the hospital and evaluated, with recent negative studies for DVT and PE, with unchanged symptoms not likely representing new pulmonary vascular process.  Abdominal pain possibly attributed to UTI, UA on prior visit was contaminated, will repeat and obtain UPT.  Patient with complex chronic pain illnesses, typically takes Tylenol, oxycodone, Robaxin.  Will eval urine, analgesia as necessary, reassess.

## 2023-01-01 NOTE — ED PROVIDER NOTE - CHRONIC CONDITION OTHER
Factor 5 Leiden, prior DVT (2018), PE (1-2022) on Eliquis, bipolar disorder, lifelong disability, seizure w/ psychogenic component

## 2023-01-01 NOTE — ED PROVIDER NOTE - PATIENT PORTAL LINK FT
You can access the FollowMyHealth Patient Portal offered by [facility] by registering at the following website: [email or web address]. By joining [employer]’s FollowMyHealth portal, you will also be able to view your health information using other applications (apps) compatible with our system.

## 2023-01-01 NOTE — ED PROVIDER NOTE - PHYSICAL EXAMINATION
CONSTITUTIONAL: Well-developed; well-nourished; in no acute distress.   SKIN: warm, dry. No erythema or warmth  HEAD: Normocephalic; atraumatic.  EYES: no conjunctival injection. PERRL.   ENT: No nasal discharge; airway clear.  NECK: Supple; non tender.  CARD: S1, S2 normal; no murmurs, gallops, or rubs. Regular rate and rhythm.   RESP: No wheezes, rales or rhonchi. Good air movement bilaterally.   ABD: soft ntnd, no guarding, no distention, no rigidity.   MSK: No cyanosis or edema. TTP to posterior L knee and calf without overlying edema, warmth, or erythema. Chest wall TTP to sternum and L posterior chest wall, reproduces chest pain.  NEURO: Alert, oriented, grossly unremarkable

## 2023-01-01 NOTE — ED ADULT TRIAGE NOTE - CHIEF COMPLAINT QUOTE
Patient brought in by ems from home complaining of chest and upper left leg pain since yesterday afternoon. PMH of DVT on eliquis, Factor V. Pt denies sob, n/v.

## 2023-01-01 NOTE — ED PROVIDER NOTE - DIFFERENTIAL DIAGNOSIS
Differential Diagnosis DDx includes but is not limited to stable chronic pain, costochondritis, uti, less likely dvt vs pe given prior workup and chronicity of symptoms.

## 2023-01-01 NOTE — ED PROVIDER NOTE - OBJECTIVE STATEMENT
37 year old F hx of Factor 5 Leiden, prior DVT (2018), PE (1-2022) on Eliquis, bipolar disorder, lifelong disability, seizure w/ psychogenic component p/w chest pain, abd pain, and L leg pain. Patient complains of pain similar to symptoms at prior visits on and before 12/29/22 without significant change. Chest pain is mild and sharp and worse with breathing, radiates to L back, unchanged since prior hospitalization. Abd pain is mild to diffuse abdomen, nonradiating, with no inciting/relieving factors, no known trigger, not associated with urinary complaints, stool changes, or vaginal complaints. L leg pain is to posterior L knee and upper calf, not associated with swelling or skin changes, worse with walking or use of the leg. Denies cough, sob, fevers. Denies n/v, lower back pain, numbness/tingling, headache, blurry vision.

## 2023-01-01 NOTE — ED PROVIDER NOTE - NSFOLLOWUPINSTRUCTIONS_ED_ALL_ED_FT
You were seen in the ED for chest pain, abdominal pain, and left leg pain.    Your exam and testing here in the ED showed no findings requiring further evaluation or treatment in the hospital at this time.    You may take Tylenol up to 1000mg every 6 hours as needed for pain.  Please also take Robaxin prescribed to you for pain as instructed.  You may take the prescribed oxycodone for breakthrough pain as needed.    Please follow up with your PCP as discussed within 1 week.    Seek immediate medical attention if you experience worsening or new symptoms, difficulty breathing, loss of consciousness, new leg swelling and warmth.

## 2023-01-01 NOTE — ED ADULT NURSE NOTE - NSIMPLEMENTINTERV_GEN_ALL_ED
Blood drawn per physician order. 21 gauge needle used. Location rt ac space w/o incident. Pressure applied until bleeding stopped. Bandaid applied. Patient instructed to call or return if problems with bleeding. 3 tubes drawn. Implemented All Universal Safety Interventions:  [place] to call system. Call bell, personal items and telephone within reach. Instruct patient to call for assistance. Room bathroom lighting operational. Non-slip footwear when patient is off stretcher. Physically safe environment: no spills, clutter or unnecessary equipment. Stretcher in lowest position, wheels locked, appropriate side rails in place.

## 2023-01-01 NOTE — ED PROVIDER NOTE - CARE PLAN
1 Principal Discharge DX:	Chest pain  Secondary Diagnosis:	Chronic leg pain  Secondary Diagnosis:	Abdominal pain

## 2023-01-01 NOTE — ED PROVIDER NOTE - ATTENDING CONTRIBUTION TO CARE
The patient is a 37y Female who has a past medical and surgery history of HTN DM Affective Dysregulation/Intermittent Explosive Disorder, Personality Disorder, Mild Intellectual disability,  Asthma Sz FV Leiden/DVT/Eliquis Bipolar Endometriosis Hypothyroidism Seasonal allergies Insomnia PTED BIBEMS from home complaining of chest and upper left leg pain since yesterday afternoon.    Vital Signs Last 24 Hrs  T(F): 98.3 HR: 107 BP: 112/80 RR: 18 SpO2: 100% (01 Jan 2023 21:11)   PE: as described; my additions and exceptions are noted in the chart    DATA:  EKG: pending at time of evaluation  LAB: Pending at time of evaluation  IMPRESSION/RISK:  Dx=Chronic pain     Consideration include Patient has been seen at [facility] for these complaints several times with recent negative extensive workups; no findings in H and P suggest acute new event requiring extensive investigation   Plan  analgesia   probable d/c  NOTE: complex care reviewed prns ordered should pt act out in manner descxribed in previous visits   repeat ua as previous specimen contaminated  reassess  rpo

## 2023-01-02 ENCOUNTER — NON-APPOINTMENT (OUTPATIENT)
Age: 38
End: 2023-01-02

## 2023-01-02 VITALS
OXYGEN SATURATION: 100 % | DIASTOLIC BLOOD PRESSURE: 84 MMHG | HEART RATE: 96 BPM | RESPIRATION RATE: 16 BRPM | TEMPERATURE: 98 F | SYSTOLIC BLOOD PRESSURE: 127 MMHG

## 2023-01-02 LAB
APPEARANCE UR: ABNORMAL
BILIRUB UR-MCNC: NEGATIVE — SIGNIFICANT CHANGE UP
COLOR SPEC: SIGNIFICANT CHANGE UP
DIFF PNL FLD: NEGATIVE — SIGNIFICANT CHANGE UP
GLUCOSE UR QL: NEGATIVE — SIGNIFICANT CHANGE UP
HCG UR QL: NEGATIVE — SIGNIFICANT CHANGE UP
KETONES UR-MCNC: NEGATIVE — SIGNIFICANT CHANGE UP
LEUKOCYTE ESTERASE UR-ACNC: NEGATIVE — SIGNIFICANT CHANGE UP
NITRITE UR-MCNC: NEGATIVE — SIGNIFICANT CHANGE UP
PH UR: 7.5 — SIGNIFICANT CHANGE UP (ref 5–8)
PROT UR-MCNC: ABNORMAL
SP GR SPEC: 1.02 — SIGNIFICANT CHANGE UP (ref 1.01–1.05)
UROBILINOGEN FLD QL: SIGNIFICANT CHANGE UP

## 2023-01-02 RX ORDER — DIPHENHYDRAMINE HCL 50 MG
50 CAPSULE ORAL ONCE
Refills: 0 | Status: COMPLETED | OUTPATIENT
Start: 2023-01-02 | End: 2023-01-02

## 2023-01-02 RX ORDER — OXYCODONE HYDROCHLORIDE 5 MG/1
1 TABLET ORAL
Qty: 6 | Refills: 0
Start: 2023-01-02 | End: 2023-01-03

## 2023-01-02 RX ORDER — OXYCODONE HYDROCHLORIDE 5 MG/1
5 TABLET ORAL ONCE
Refills: 0 | Status: DISCONTINUED | OUTPATIENT
Start: 2023-01-02 | End: 2023-01-02

## 2023-01-02 RX ORDER — METHOCARBAMOL 500 MG/1
1 TABLET, FILM COATED ORAL
Qty: 21 | Refills: 0
Start: 2023-01-02 | End: 2023-01-08

## 2023-01-02 RX ADMIN — OXYCODONE HYDROCHLORIDE 5 MILLIGRAM(S): 5 TABLET ORAL at 05:35

## 2023-01-02 RX ADMIN — METHOCARBAMOL 500 MILLIGRAM(S): 500 TABLET, FILM COATED ORAL at 00:28

## 2023-01-02 RX ADMIN — Medication 50 MILLIGRAM(S): at 08:00

## 2023-01-02 RX ADMIN — Medication 50 MILLIGRAM(S): at 02:04

## 2023-01-02 NOTE — PROVIDER CONTACT NOTE (OTHER) - ASSESSMENT
Interventional Radiology Intra-procedural Nursing Note    Patient Name: Bhavani White  Medical Record Number: [id number]  Today's Date: June 11, 2020    Start Time: 1135  End of procedure time: 1205  Procedure: Gastrojejunostomy Tube Placement  Report given to: RN station 33    Other Notes: Pt. transferred to IR table. Prepped and draped appropriately. Monitoring equipment applied. NC applied. No complaints of pain at this time. Timeout recorded.    16f GJ-tube placed by Dr. Munoz. G tube to bag for venting, J (capped). Site dressed with gauze and covered with border gauze. C/D/I.    Medication administration:    Fentanyl 100 mcg IVP  Versed 2 mg IVP     Notified by provider that pt is ready for discharge, needs transportation back to her group home residence.  Pt resides at [facility] group home [facility].  Called residence [phone]; spoke with Ms. Shannanstefan, who states that pt travels via ambulette or ambulance.   Discussed with provider and pt; pt aware of the need for travel via above noted mode of transport.  Will arrange ambulette; provider in agreement. Verbal huddle complete.  Pt to travel back to residence via ambulette. MAS :   RAVINDRA contacted [facility] to arrange transportation; ETA 60-90 minutes; trip # . Notified by provider that pt is ready for discharge, needs transportation back to her group home residence.  Pt resides at [facility] group home [facility].  Called residence [phone]; spoke with Ms. Shannanstefan, who states that pt travels via ambulette or ambulance.   Discussed with provider and pt; pt aware of the need for travel via above noted mode of transport.  Will arrange ambulance; provider in agreement. Verbal huddle complete.  Pt to travel back to residence via ambulance. MAS :   RAVINDRA contacted [facility] to arrange transportation; ETA 60-90 minutes; trip # . [id number]

## 2023-01-02 NOTE — ED ADULT NURSE REASSESSMENT NOTE - NS ED NURSE REASSESS COMMENT FT1
SW confirmed ride is arranged for patient.   Pt calm and cooperative in spot 22A.  Will continue to monitor.
Pt ride is late.  Pt understands and is calm and cooperative.
Pt is in NAD, denies chest pain, pain meds given, pt has some relief. X ray and CT performed.

## 2023-01-04 ENCOUNTER — APPOINTMENT (OUTPATIENT)
Dept: INTERNAL MEDICINE | Facility: CLINIC | Age: 38
End: 2023-01-04
Payer: MEDICARE

## 2023-01-04 VITALS
HEIGHT: 68 IN | DIASTOLIC BLOOD PRESSURE: 78 MMHG | HEART RATE: 99 BPM | BODY MASS INDEX: 42.44 KG/M2 | OXYGEN SATURATION: 98 % | SYSTOLIC BLOOD PRESSURE: 118 MMHG | RESPIRATION RATE: 16 BRPM | WEIGHT: 280 LBS

## 2023-01-04 DIAGNOSIS — R07.9 CHEST PAIN, UNSPECIFIED: ICD-10-CM

## 2023-01-04 DIAGNOSIS — R56.9 UNSPECIFIED CONVULSIONS: ICD-10-CM

## 2023-01-04 PROCEDURE — 99495 TRANSJ CARE MGMT MOD F2F 14D: CPT

## 2023-01-04 RX ORDER — LIDOCAINE 40 MG/G
4 PATCH TOPICAL
Qty: 30 | Refills: 3 | Status: ACTIVE | COMMUNITY
Start: 2023-01-04 | End: 1900-01-01

## 2023-01-04 RX ORDER — METHOCARBAMOL 500 MG/1
500 TABLET, FILM COATED ORAL 3 TIMES DAILY
Refills: 0 | Status: ACTIVE | COMMUNITY
Start: 2023-01-04

## 2023-01-04 NOTE — HEALTH RISK ASSESSMENT
[Former] : Former [No] : In the past 12 months have you used drugs other than those required for medical reasons? No [Other reason not done] : Other reason not done [I have developed a follow-up plan documented below in the note.] : I have developed a follow-up plan documented below in the note. [de-identified] : former [Audit-CScore] : 0 [de-identified] : bipolar, schizoaffective d/o.  following with psych

## 2023-01-04 NOTE — PHYSICAL EXAM
[No Acute Distress] : no acute distress [Well Nourished] : well nourished [Well Developed] : well developed [Well-Appearing] : well-appearing [Normal Voice/Communication] : normal voice/communication [Normal Sclera/Conjunctiva] : normal sclera/conjunctiva [PERRL] : pupils equal round and reactive to light [EOMI] : extraocular movements intact [Normal Outer Ear/Nose] : the outer ears and nose were normal in appearance [No JVD] : no jugular venous distention [No Lymphadenopathy] : no lymphadenopathy [Supple] : supple [Thyroid Normal, No Nodules] : the thyroid was normal and there were no nodules present [No Respiratory Distress] : no respiratory distress  [No Accessory Muscle Use] : no accessory muscle use [Clear to Auscultation] : lungs were clear to auscultation bilaterally [Normal Rate] : normal rate  [Regular Rhythm] : with a regular rhythm [Normal S1, S2] : normal S1 and S2 [No Murmur] : no murmur heard [No Carotid Bruits] : no carotid bruits [No Abdominal Bruit] : a ~M bruit was not heard ~T in the abdomen [Pedal Pulses Present] : the pedal pulses are present [No Edema] : there was no peripheral edema [No Palpable Aorta] : no palpable aorta [Soft] : abdomen soft [Non Tender] : non-tender [Non-distended] : non-distended [No Masses] : no abdominal mass palpated [No HSM] : no HSM [Normal Bowel Sounds] : normal bowel sounds [Normal Posterior Cervical Nodes] : no posterior cervical lymphadenopathy [Normal Anterior Cervical Nodes] : no anterior cervical lymphadenopathy [No CVA Tenderness] : no CVA  tenderness [No Spinal Tenderness] : no spinal tenderness [No Joint Swelling] : no joint swelling [Grossly Normal Strength/Tone] : grossly normal strength/tone [Coordination Grossly Intact] : coordination grossly intact [No Focal Deficits] : no focal deficits [Normal Gait] : normal gait [Speech Grossly Normal] : speech grossly normal [Memory Grossly Normal] : memory grossly normal [Normal Affect] : the affect was normal [Alert and Oriented x3] : oriented to person, place, and time [Normal Mood] : the mood was normal [Normal Insight/Judgement] : insight and judgment were intact [75457 - Moderate Complexity requires multiple possible diagnoses and/or the management options, moderate complexity of the medical data (tests, etc.) to be reviewed, and moderate risk of significant complications, morbidity, and/or mortality as well as co] : Moderate Complexity [de-identified] : obese [de-identified] : left parasternal chest tenderness.

## 2023-01-04 NOTE — HISTORY OF PRESENT ILLNESS
[Post-hospitalization from ___ Hospital] : Post-hospitalization from [unfilled] Hospital [Discharge Summary] : discharge summary [Pertinent Labs] : pertinent labs [Discharge Med List] : discharge medication list [Patient Contacted By: ____] : and contacted by [unfilled] [FreeTextEntry2] : 36 y/o female, resident of snf, with a hx of factor V leiden deficiency, DVT, hypothyroid, high BP, ssz d/o with hx psychogenic sz, asthma, bipolar d/o, schizoaffective d/o, endometriosis here to establish care.\par s/p recent admission to [facility] with pseudoseizures, knee pains, pelvic pains from 9/20/22 - 9/23/22. \par following with neurology and psych. \par Has been on psych meds\par \par Available notes reviewed. Has followed up with allergy.  Has followed up with ortho.  Had started PT.  to restart.\par \par Reviewed hospital chart over HIE.  Has been in the ER 2 times since the last visit.  Also with summary from osh ER visit since the d/c.  Had been admitted with calf pains and CP - w/u neg for DVT and PE.  Was continued on a/c - dose was increased.  WAs seen in the Er for abd pain.  Again testing neg.  was seen again for chest and leg/calf pains.  \par labs reviewed on HIE  - \par Had been given lidocaine patches in the hospital. \par Reports that the pains have been improved with the meds.  \par \par Has had the psych meds adjusted.  Has upcoming f/u appt.\par following with hematology.  with follow up appt.\par Saw ortho and started on rx/PT as noted for the leg.  \par some numbness and swelling at the hands. \par hx pelvic pains as noted. \par \par gyn - following - to go for transvaginal pelvic sono\par follows with ophtho\par follows with dental. \par \par flu vaccine - to get.

## 2023-01-04 NOTE — REVIEW OF SYSTEMS
[Joint Pain] : joint pain [Headache] : headache [Negative] : Heme/Lymph [Chest Pain] : chest pain [Joint Stiffness] : no joint stiffness [Joint Swelling] : no joint swelling [Muscle Weakness] : no muscle weakness [Muscle Pain] : no muscle pain [Back Pain] : no back pain [FreeTextEntry9] : leg pain [de-identified] : hx sz and headaches.  some numbness at the hands [de-identified] : as per HPI

## 2023-01-04 NOTE — COUNSELING
[Potential consequences of obesity discussed] : Potential consequences of obesity discussed [Benefits of weight loss discussed] : Benefits of weight loss discussed [Encouraged to increase physical activity] : Encouraged to increase physical activity [Decrease Portions] : decrease portions [Limited decision making ability] : Limited decision making ability [Needs reinforcement, provided] : Patient needs reinforcement on understanding of lifestyle changes and steps needed to achieve self management goal; reinforcement was provided

## 2023-01-06 LAB
CULTURE RESULTS: SIGNIFICANT CHANGE UP
SPECIMEN SOURCE: SIGNIFICANT CHANGE UP

## 2023-01-07 ENCOUNTER — EMERGENCY (EMERGENCY)
Facility: HOSPITAL | Age: 38
LOS: 1 days | Discharge: HOME | End: 2023-01-07
Attending: EMERGENCY MEDICINE | Admitting: EMERGENCY MEDICINE
Payer: MEDICARE

## 2023-01-07 VITALS
HEIGHT: 68 IN | SYSTOLIC BLOOD PRESSURE: 125 MMHG | OXYGEN SATURATION: 99 % | DIASTOLIC BLOOD PRESSURE: 74 MMHG | TEMPERATURE: 98 F | RESPIRATION RATE: 18 BRPM | HEART RATE: 100 BPM

## 2023-01-07 PROCEDURE — 99284 EMERGENCY DEPT VISIT MOD MDM: CPT

## 2023-01-08 ENCOUNTER — NON-APPOINTMENT (OUTPATIENT)
Age: 38
End: 2023-01-08

## 2023-01-08 VITALS
RESPIRATION RATE: 18 BRPM | SYSTOLIC BLOOD PRESSURE: 128 MMHG | OXYGEN SATURATION: 100 % | TEMPERATURE: 98 F | DIASTOLIC BLOOD PRESSURE: 73 MMHG | HEART RATE: 104 BPM

## 2023-01-08 PROCEDURE — 71046 X-RAY EXAM CHEST 2 VIEWS: CPT | Mod: 26

## 2023-01-08 RX ORDER — ACETAMINOPHEN 500 MG
975 TABLET ORAL ONCE
Refills: 0 | Status: COMPLETED | OUTPATIENT
Start: 2023-01-08 | End: 2023-01-08

## 2023-01-08 RX ORDER — DIPHENHYDRAMINE HCL 50 MG
50 CAPSULE ORAL ONCE
Refills: 0 | Status: COMPLETED | OUTPATIENT
Start: 2023-01-08 | End: 2023-01-08

## 2023-01-08 RX ORDER — HALOPERIDOL DECANOATE 100 MG/ML
5 INJECTION INTRAMUSCULAR ONCE
Refills: 0 | Status: COMPLETED | OUTPATIENT
Start: 2023-01-08 | End: 2023-01-08

## 2023-01-08 RX ORDER — CHLORPROMAZINE HCL 10 MG
50 TABLET ORAL ONCE
Refills: 0 | Status: COMPLETED | OUTPATIENT
Start: 2023-01-08 | End: 2023-01-08

## 2023-01-08 RX ADMIN — Medication 2 MILLIGRAM(S): at 08:30

## 2023-01-08 RX ADMIN — Medication 50 MILLIGRAM(S): at 13:30

## 2023-01-08 RX ADMIN — Medication 50 MILLIGRAM(S): at 15:54

## 2023-01-08 RX ADMIN — Medication 975 MILLIGRAM(S): at 02:44

## 2023-01-08 RX ADMIN — Medication 2 MILLIGRAM(S): at 15:54

## 2023-01-08 RX ADMIN — HALOPERIDOL DECANOATE 5 MILLIGRAM(S): 100 INJECTION INTRAMUSCULAR at 08:30

## 2023-01-08 NOTE — PROVIDER CONTACT NOTE (OTHER) - ACTION/TREATMENT ORDERED:
she will contact . However, writer received a phone call ALEYDA Pineda [phone] and had 3 ways conference call with the  Ms. Tamayo [phone].

## 2023-01-08 NOTE — PROVIDER CONTACT NOTE (OTHER) - ASSESSMENT
Pt in ED and from [facility]. writer reached to these # [phone]; [phone] Narda – [phone] but no answer. Writer reviewed previous notes and then contacted , Stephanie, at [phone] who informed she is not working with this agency anymore and requested to call residence  Director Nguyễn [phone] and then reached out to residence  Director Nguyễn [phone] and  Writer confirmed pts residence  address: [address] [place] [address] and also confirmed that patient’s mode of transportation is BLS / ambulance  with supervision.    Writer informed the Nguyễn [phone]  that there is no one is answering the phone call and these # [phone]; [phone] Narda – [phone] and writer was informed there is no staff at group home at this time.  Res  Director Nguyễn [phone]  requested this writer to call ALEYDA Real and  [facility].

## 2023-01-08 NOTE — ED PROVIDER NOTE - CLINICAL SUMMARY MEDICAL DECISION MAKING FREE TEXT BOX
Tiffany Meza MD, PGY-3: 37-year-old female complex medical hx including asthma, DM, bipolar,  presenting with body aches and chills x5 days, a/w voice hoarseness. vss, afebrile, pe no erythema to the oropharnyx, no exudates, no significant voice hoarseness. do not suspect RPA or PTA, consider viral etiology. plan for viral swab, cxr, tylenol.

## 2023-01-08 NOTE — ED ADULT NURSE REASSESSMENT NOTE - NS ED NURSE REASSESS COMMENT FT1
pt attempting to leave ED, security called, pt uncooperative with staff and several attempts made to redirect patient, pt brought to  low acuity area, medicated per MD orders, SW to arrange transport back to facility. PES with patient.

## 2023-01-08 NOTE — ED ADULT NURSE REASSESSMENT NOTE - NS ED NURSE REASSESS COMMENT FT1
Break coverage RN. Patient resting in stretcher, respirations even and unlabored. States slight improvement in condition. Awaiting results and dispo. Blankets provided for comfort. Stretcher in lowest position, wheels locked, appropriate side rails in place.

## 2023-01-08 NOTE — ED ADULT NURSE REASSESSMENT NOTE - NS ED NURSE REASSESS COMMENT FT1
Awaiting EMS transport, pt requesting medications for anxiety, calm and cooperative at this time. Medications given as ordered.

## 2023-01-08 NOTE — ED PROVIDER NOTE - PHYSICAL EXAMINATION
Gen: WDWN, NAD  HEENT: EOMI, no nasal discharge, mucous membranes moist, no cervical lymphadenopathy, no tonsillar exudates, no erythema to the oropharynx.   CV: RRR, 2+ radial pulses L radial  Resp:  no accessory muscle use, no increased work of breathing  GI: Abdomen soft non-distended, NTTP  MSK: No open wounds, no bruising, no LE edema  Neuro: A&Ox4, following commands, moving all four extremities spontaneously  Psych: appropriate mood

## 2023-01-08 NOTE — ED ADULT NURSE NOTE - COVID-19 RESULT DATE/TIME
Patient : Mary Mac Age: 45 year old Sex: female   MRN: [id number] Encounter Date: 8/30/2020      History     Chief Complaint   Patient presents with   • Headache New Onset on New Symptom     Dizziness while laying down, +nausea. Feels like room is spinning. Right arm numbness   • Dizziness     45-year-old female with past medical history as noted below presents to the ER for evaluation of vertiginous symptoms while laying down with associated nausea earlier today.  States that she was feeling slightly nauseous during the day and when she laid down she had sudden onset sensation of the room spinning that lasted for a few seconds.  Symptoms improved after she sat up and change position.  She is felt as though she is unable to lay flat due to anxiety about her symptoms returning.  She is also endorsing paresthesias on the right side of her face and in her right arm and leg, though she thinks this is due to nerves.  Endorses being under a large amount of stress recently with school and work, and notices that she gets a variety of somatic symptoms around the time of her period.  Denies any headache, vomiting, blurry vision, difficulty swallowing, slurred speech or difficulty walking.    PMH: Catamenial migraines  PSHx: None  FamHx: No history of stroke  SocialHx: No EtOH/tobacco/illicits  Allergies: NKDA            No Known Allergies    Current Discharge Medication List          Current Discharge Medication List      New Prescriptions    Details   ondansetron (ZOFRAN) 4 MG tablet Take 1 tablet by mouth every 8 hours as needed for Nausea.  Qty: 10 tablet, Refills: 0             No past medical history on file.    No past surgical history on file.    No family history on file.    Social History     Tobacco Use   • Smoking status: Not on file   Substance Use Topics   • Alcohol use: Not on file   • Drug use: Not on file       Review of Systems   Constitutional: Negative for chills and fever.   HENT: Negative for  congestion and sore throat.    Respiratory: Negative for cough and shortness of breath.    Cardiovascular: Negative for chest pain and leg swelling.   Gastrointestinal: Positive for nausea. Negative for abdominal pain, diarrhea and vomiting.   Genitourinary: Negative for dysuria and hematuria.   Musculoskeletal: Negative for back pain and neck pain.   Skin: Negative for rash and wound.   Neurological: Positive for dizziness and numbness. Negative for syncope and headaches.       Physical Exam     ED Triage Vitals [08/30/20 1702]   ED Triage Vitals Group      Temp 97.2 °F (36.2 °C)      Heart Rate 77      Resp 16      /79      SpO2 100 %      EtCO2 mmHg       Height 5' 2\" (1.575 m)      Weight 144 lb 6.4 oz (65.5 kg)      Weight Scale Used ED Actual      BMI (Calculated) 26.41      IBW/kg (Calculated) 50.1       Physical Exam  Vitals signs and nursing note reviewed.   Constitutional:       General: She is not in acute distress.     Appearance: She is not ill-appearing.   HENT:      Head: Normocephalic and atraumatic.   Eyes:      Extraocular Movements: Extraocular movements intact.      Conjunctiva/sclera: Conjunctivae normal.   Cardiovascular:      Rate and Rhythm: Normal rate and regular rhythm.      Pulses: Normal pulses.      Heart sounds: Normal heart sounds.   Pulmonary:      Effort: Pulmonary effort is normal. No respiratory distress.      Breath sounds: Normal breath sounds and air entry.   Abdominal:      General: There is no distension.      Palpations: Abdomen is soft. There is no mass.      Tenderness: There is no abdominal tenderness.   Musculoskeletal:         General: No swelling or deformity.   Skin:     General: Skin is warm and dry.      Capillary Refill: Capillary refill takes less than 2 seconds.      Findings: No rash.   Neurological:      Mental Status: She is alert and oriented to person, place, and time. Mental status is at baseline.      Cranial Nerves: No facial asymmetry.       Comments: CN II-XII tested and intact, no facial droop, EOMI, PERRL, no uvular deviation, normal shoulder shrug and head turn, normal swallow, normal facial sensation bilaterally, able to raise eyebrows, symmetric smile.  Sensation intact to sharp/dull differentiation in all extremities.  Motor: Normal tone and bulk. No abnormal movements appreciated. No pronator drift. Strength tested and 5/5 in bilateral wrist flexion/extension, elbow flexion/extension, shoulder abduction, straight leg raise, knee flexion/extension, ankle dorsiflexion/plantarflexion. Patient ambulates with a steady gait.  Coordination: Finger to nose and heel to shin testing intact bilaterally.  Reflexes: Biceps and patellar reflexes WNL and symmetric bilaterally. Babinski with downgoing toes bilaterally.     Psychiatric:         Mood and Affect: Mood normal.         Behavior: Behavior normal.         ED Course     Procedures    Lab Results     No results found for this visit on 08/30/20.    EKG Results     EKG performed at 1722 demonstrates normal sinus rhythm, rate of 62.  T wave inversions noted in leads V1 through V3 but no ST segment elevations or depressions, no ectopy, QTc 406    Radiology Results     Imaging Results    None         ED Medication Orders (From admission, onward)    None               MDM  Number of Diagnoses or Management Options  Benign paroxysmal positional vertigo, unspecified laterality:   Catamenial disorder:   Diagnosis management comments: Patient presents with currently improved symptoms.  EKG does not demonstrate any obvious evidence of arrhythmia, low suspicion for cardiac etiology of patient's symptoms.  She is neurologically intact and has no risk factors for stroke.  Symptoms appear consistent with benign paroxysmal positional vertigo which has since resolved.  Agree with the patient's assessment that her paresthesias are likely associated with nerves and anxiety and somatization.  Do not feel the patient would  benefit from any imaging at this time.  She is otherwise well-appearing, hemodynamically stable and afebrile.  Provided Rx for Zofran, meclizine, and Tylenol for symptom relief.      Clinical Impression     ED Diagnosis   1. Catamenial disorder     2. Benign paroxysmal positional vertigo, unspecified laterality         Disposition        Discharge 8/30/2020  7:16 PM  Hadeal TENISHA Willwi discharge to home/self care.          Rolan Dsouza DO   8/30/2020 6:19 PM                      Rolan Dsouza DO  Resident  08/30/20 1917     21-Dec-2022 20:40

## 2023-01-08 NOTE — ED PROVIDER NOTE - OBJECTIVE STATEMENT
37-year-old female complex medical hx including asthma, DM, bipolar,  presenting with body aches and chills x5 days, a/w voice hoarseness.  despite triage note patient denies sore throat.   Patient denies recent fevers, cough, abdominal pain, N/V.  BIBA from group home, no recent missed doses of medications.

## 2023-01-08 NOTE — PROVIDER CONTACT NOTE (OTHER) - RECOMMENDATIONS
Writer contacted ALEYDA Real [phone] no answer. After this Writer contacted  Collet [phone] and informed her about this discharge and writer was informed

## 2023-01-08 NOTE — ED ADULT NURSE NOTE - CHIEF COMPLAINT QUOTE
Pt brought in by EMS from group home, c/o body aches and chills x5 days. Denies cough. c/o throat pain and hoarse voice started on Monday. Hx DM, Seizure, Bipolar, Hypothyroid

## 2023-01-08 NOTE — ED PROVIDER NOTE - PATIENT PORTAL LINK FT
You can access the FollowMyHealth Patient Portal offered by [facility] by registering at the following website: [email or web address]. By joining [email or web address]’s FollowMyHealth portal, you will also be able to view your health information using other applications (apps) compatible with our system.

## 2023-01-08 NOTE — ED PROVIDER NOTE - ATTENDING CONTRIBUTION TO CARE
37-year-old female complex medical hx including asthma, DM, bipolar,  presenting with body aches and chills x5 days, a/w voice hoarseness. vss, afebrile, pe no erythema to the oropharnyx, no exudates, no significant voice hoarseness. do not suspect RPA or PTA, consider viral etiology. plan for viral swab, cxr, tylenol.

## 2023-01-08 NOTE — ED PROVIDER NOTE - NSFOLLOWUPINSTRUCTIONS_ED_ALL_ED_FT
--take tylenol as needed for pain, fevers. Take tylenol 1000mg every 6 hours.  --today, the lab tests we did include viral swab, the imaging tests we did include chest xray. results significant for no abnormalities - no covid, no bacterial pneumonia. we have included these test results in your paperwork. please take them to your follow-up appointment  --given that you were in the ED today, we recommend a followup visit with your general doctor (primary care doctor) within 7 days.   --your diagnosis is: viral illness  --return to the ED if your current symptoms worsen, if symptoms change in type/nature, if shortness of breath.

## 2023-01-12 NOTE — DIETITIAN INITIAL EVALUATION ADULT. - IDEAL BODY WEIGHT (KG)
63.5 Cyclophosphamide Pregnancy And Lactation Text: This medication is Pregnancy Category D and it isn't considered safe during pregnancy. This medication is excreted in breast milk.

## 2023-01-13 ENCOUNTER — EMERGENCY (EMERGENCY)
Facility: HOSPITAL | Age: 38
LOS: 1 days | Discharge: HOME | End: 2023-01-13
Admitting: STUDENT IN AN ORGANIZED HEALTH CARE EDUCATION/TRAINING PROGRAM
Payer: MEDICARE

## 2023-01-13 VITALS
HEART RATE: 103 BPM | OXYGEN SATURATION: 100 % | SYSTOLIC BLOOD PRESSURE: 114 MMHG | DIASTOLIC BLOOD PRESSURE: 85 MMHG | TEMPERATURE: 98 F | RESPIRATION RATE: 16 BRPM | HEIGHT: 68 IN

## 2023-01-13 PROCEDURE — 99284 EMERGENCY DEPT VISIT MOD MDM: CPT

## 2023-01-13 RX ORDER — OXYCODONE HYDROCHLORIDE 5 MG/1
5 TABLET ORAL ONCE
Refills: 0 | Status: DISCONTINUED | OUTPATIENT
Start: 2023-01-13 | End: 2023-01-13

## 2023-01-13 RX ORDER — DIVALPROEX SODIUM 500 MG/1
1000 TABLET, DELAYED RELEASE ORAL ONCE
Refills: 0 | Status: DISCONTINUED | OUTPATIENT
Start: 2023-01-13 | End: 2023-01-13

## 2023-01-13 RX ORDER — ACETAMINOPHEN 500 MG
975 TABLET ORAL ONCE
Refills: 0 | Status: COMPLETED | OUTPATIENT
Start: 2023-01-13 | End: 2023-01-13

## 2023-01-13 RX ADMIN — Medication 975 MILLIGRAM(S): at 23:28

## 2023-01-13 RX ADMIN — OXYCODONE HYDROCHLORIDE 5 MILLIGRAM(S): 5 TABLET ORAL at 23:28

## 2023-01-14 ENCOUNTER — NON-APPOINTMENT (OUTPATIENT)
Age: 38
End: 2023-01-14

## 2023-01-14 VITALS
SYSTOLIC BLOOD PRESSURE: 120 MMHG | OXYGEN SATURATION: 100 % | TEMPERATURE: 98 F | HEART RATE: 96 BPM | RESPIRATION RATE: 17 BRPM | DIASTOLIC BLOOD PRESSURE: 83 MMHG

## 2023-01-14 LAB
ALBUMIN SERPL ELPH-MCNC: 4.4 G/DL — SIGNIFICANT CHANGE UP (ref 3.3–5)
ALP SERPL-CCNC: 83 U/L — SIGNIFICANT CHANGE UP (ref 40–120)
ALT FLD-CCNC: 28 U/L — SIGNIFICANT CHANGE UP (ref 4–33)
ANION GAP SERPL CALC-SCNC: 13 MMOL/L — SIGNIFICANT CHANGE UP (ref 7–14)
APPEARANCE UR: ABNORMAL
AST SERPL-CCNC: 16 U/L — SIGNIFICANT CHANGE UP (ref 4–32)
BACTERIA # UR AUTO: ABNORMAL
BASOPHILS # BLD AUTO: 0.02 K/UL — SIGNIFICANT CHANGE UP (ref 0–0.2)
BASOPHILS NFR BLD AUTO: 0.3 % — SIGNIFICANT CHANGE UP (ref 0–2)
BILIRUB SERPL-MCNC: 0.2 MG/DL — SIGNIFICANT CHANGE UP (ref 0.2–1.2)
BILIRUB UR-MCNC: NEGATIVE — SIGNIFICANT CHANGE UP
BUN SERPL-MCNC: 10 MG/DL — SIGNIFICANT CHANGE UP (ref 7–23)
CALCIUM SERPL-MCNC: 9.6 MG/DL — SIGNIFICANT CHANGE UP (ref 8.4–10.5)
CHLORIDE SERPL-SCNC: 104 MMOL/L — SIGNIFICANT CHANGE UP (ref 98–107)
CO2 SERPL-SCNC: 22 MMOL/L — SIGNIFICANT CHANGE UP (ref 22–31)
COD CRY URNS QL: ABNORMAL
COLOR SPEC: YELLOW — SIGNIFICANT CHANGE UP
CREAT SERPL-MCNC: 0.68 MG/DL — SIGNIFICANT CHANGE UP (ref 0.5–1.3)
DIFF PNL FLD: ABNORMAL
EGFR: 116 ML/MIN/1.73M2 — SIGNIFICANT CHANGE UP
EOSINOPHIL # BLD AUTO: 0.04 K/UL — SIGNIFICANT CHANGE UP (ref 0–0.5)
EOSINOPHIL NFR BLD AUTO: 0.6 % — SIGNIFICANT CHANGE UP (ref 0–6)
EPI CELLS # UR: 7 /HPF — SIGNIFICANT CHANGE UP (ref 0–5)
GLUCOSE SERPL-MCNC: 81 MG/DL — SIGNIFICANT CHANGE UP (ref 70–99)
GLUCOSE UR QL: NEGATIVE — SIGNIFICANT CHANGE UP
HCG SERPL-ACNC: <5 MIU/ML — SIGNIFICANT CHANGE UP
HCT VFR BLD CALC: 34.1 % — LOW (ref 34.5–45)
HGB BLD-MCNC: 10.9 G/DL — LOW (ref 11.5–15.5)
HYALINE CASTS # UR AUTO: 1 /LPF — SIGNIFICANT CHANGE UP (ref 0–7)
IANC: 3.19 K/UL — SIGNIFICANT CHANGE UP (ref 1.8–7.4)
IMM GRANULOCYTES NFR BLD AUTO: 0.2 % — SIGNIFICANT CHANGE UP (ref 0–0.9)
KETONES UR-MCNC: ABNORMAL
LEUKOCYTE ESTERASE UR-ACNC: ABNORMAL
LYMPHOCYTES # BLD AUTO: 2.41 K/UL — SIGNIFICANT CHANGE UP (ref 1–3.3)
LYMPHOCYTES # BLD AUTO: 38.7 % — SIGNIFICANT CHANGE UP (ref 13–44)
MCHC RBC-ENTMCNC: 29.4 PG — SIGNIFICANT CHANGE UP (ref 27–34)
MCHC RBC-ENTMCNC: 32 GM/DL — SIGNIFICANT CHANGE UP (ref 32–36)
MCV RBC AUTO: 91.9 FL — SIGNIFICANT CHANGE UP (ref 80–100)
MONOCYTES # BLD AUTO: 0.56 K/UL — SIGNIFICANT CHANGE UP (ref 0–0.9)
MONOCYTES NFR BLD AUTO: 9 % — SIGNIFICANT CHANGE UP (ref 2–14)
NEUTROPHILS # BLD AUTO: 3.19 K/UL — SIGNIFICANT CHANGE UP (ref 1.8–7.4)
NEUTROPHILS NFR BLD AUTO: 51.2 % — SIGNIFICANT CHANGE UP (ref 43–77)
NITRITE UR-MCNC: NEGATIVE — SIGNIFICANT CHANGE UP
NRBC # BLD: 0 /100 WBCS — SIGNIFICANT CHANGE UP (ref 0–0)
NRBC # FLD: 0 K/UL — SIGNIFICANT CHANGE UP (ref 0–0)
PH UR: 6 — SIGNIFICANT CHANGE UP (ref 5–8)
PLATELET # BLD AUTO: 185 K/UL — SIGNIFICANT CHANGE UP (ref 150–400)
POTASSIUM SERPL-MCNC: 4.1 MMOL/L — SIGNIFICANT CHANGE UP (ref 3.5–5.3)
POTASSIUM SERPL-SCNC: 4.1 MMOL/L — SIGNIFICANT CHANGE UP (ref 3.5–5.3)
PROT SERPL-MCNC: 7.6 G/DL — SIGNIFICANT CHANGE UP (ref 6–8.3)
PROT UR-MCNC: ABNORMAL
RBC # BLD: 3.71 M/UL — LOW (ref 3.8–5.2)
RBC # FLD: 13.4 % — SIGNIFICANT CHANGE UP (ref 10.3–14.5)
RBC CASTS # UR COMP ASSIST: 550 /HPF — SIGNIFICANT CHANGE UP (ref 0–4)
SODIUM SERPL-SCNC: 139 MMOL/L — SIGNIFICANT CHANGE UP (ref 135–145)
SP GR SPEC: 1.03 — SIGNIFICANT CHANGE UP (ref 1.01–1.05)
UROBILINOGEN FLD QL: ABNORMAL
WBC # BLD: 6.23 K/UL — SIGNIFICANT CHANGE UP (ref 3.8–10.5)
WBC # FLD AUTO: 6.23 K/UL — SIGNIFICANT CHANGE UP (ref 3.8–10.5)
WBC UR QL: 30 /HPF — SIGNIFICANT CHANGE UP (ref 0–5)

## 2023-01-14 RX ORDER — CHLORPROMAZINE HCL 10 MG
50 TABLET ORAL ONCE
Refills: 0 | Status: COMPLETED | OUTPATIENT
Start: 2023-01-14 | End: 2023-01-14

## 2023-01-14 RX ORDER — NITROFURANTOIN MACROCRYSTAL 50 MG
100 CAPSULE ORAL
Refills: 0 | Status: DISCONTINUED | OUTPATIENT
Start: 2023-01-14 | End: 2023-01-17

## 2023-01-14 RX ORDER — OXYCODONE HYDROCHLORIDE 5 MG/1
5 TABLET ORAL ONCE
Refills: 0 | Status: DISCONTINUED | OUTPATIENT
Start: 2023-01-14 | End: 2023-01-14

## 2023-01-14 RX ORDER — NITROFURANTOIN MACROCRYSTAL 50 MG
1 CAPSULE ORAL
Qty: 10 | Refills: 0
Start: 2023-01-14 | End: 2023-01-18

## 2023-01-14 RX ORDER — ACETAMINOPHEN 500 MG
650 TABLET ORAL ONCE
Refills: 0 | Status: COMPLETED | OUTPATIENT
Start: 2023-01-14 | End: 2023-01-14

## 2023-01-14 RX ADMIN — OXYCODONE HYDROCHLORIDE 5 MILLIGRAM(S): 5 TABLET ORAL at 04:04

## 2023-01-14 RX ADMIN — Medication 50 MILLIGRAM(S): at 08:59

## 2023-01-14 RX ADMIN — Medication 650 MILLIGRAM(S): at 04:04

## 2023-01-14 RX ADMIN — Medication 50 MILLIGRAM(S): at 00:39

## 2023-01-14 RX ADMIN — Medication 650 MILLIGRAM(S): at 07:31

## 2023-01-14 RX ADMIN — Medication 100 MILLIGRAM(S): at 08:40

## 2023-01-14 RX ADMIN — OXYCODONE HYDROCHLORIDE 5 MILLIGRAM(S): 5 TABLET ORAL at 08:40

## 2023-01-14 NOTE — ED PROVIDER NOTE - OBJECTIVE STATEMENT
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  She endorses that she is currently on day 3 of her period she endorses taking Tylenol with minimal relief.  No associated lightheadedness, dizziness, urinary symptoms, syncope

## 2023-01-14 NOTE — ED PROVIDER NOTE - CCCP TRG CHIEF CMPLNT
Complexity (Necessary For Coding; Major - 90 Day Global With Some Exceptions; Minor - 10 Day Global): minor
Vag Bleed/ Period cramps
Risk Assessment Explanation (Does Not Render In The Note): Clinical determination of the probability and/or consequences of an event, such as surgery. Clinical assessment of the level of risk is affected by the nature of the event under consideration for the patient. Modifier 57 is used to indicate an Evaluation and Management (E/M) service resulted in the initial decision to perform surgery either the day before a major surgery (90 day global) or the day of a major surgery.
Discussion: Decision for surgery refers to any surgeries performed today, or discussion of treatment in the future. In general, decision for repair is not made until the final extent of the surgical wound is known.
Date Of Surgery - Today Or Tomorrow?: today
Identified Risk Factors Documented?: yes

## 2023-01-14 NOTE — CHART NOTE - NSCHARTNOTEFT_GEN_A_CORE
Transportation    SW was notified by medical provider that pt is cleared for discharge and transport needed to return to group home, [facility]. SW met with pt at beside she confirmed her group address to be -[address] [place] [address]; Ph# [phone].  SW called the group home [phone] and there was no response. RAVINDRA reviewed previous notes and obtained a few group home staff names and ph #'s. RAVINDRA called , Ms. Tamayo  there was no answer, left a message requesting a call back.  RAVINDRA called , Ms. Collet  there was no answer, left a message requesting a call back. RAVINDRA called RN, Daryl Pineda , number was no longer in service. RAVINDRA called RN, Addie , she answered stating that she is the on-call RN; while on the phone Addie called staff members Ms. Tamayo and Ms. Collet , there was no answer and Addie left messages for them. Addie explained that the pt is a resident at the group home and pt requires supervision; staff should be available at 9am at the group home.    RAVINDRA contacted [facility] – [phone] spoke with Faye; invoice number #[id number]. RAVINDRA contacted [facility]  to arrange transport via ambulance; Trip# [id number] is 9am.  Verbal huddle completed. Medical team was updated and in agreement with this plan

## 2023-01-14 NOTE — ED PROVIDER NOTE - NSFOLLOWUPINSTRUCTIONS_ED_ALL_ED_FT
Dysmenorrhea refers to cramps caused by the muscles of the uterus tightening (malinda) during a menstrual period. Dysmenorrhea may be mild, or it may be severe enough to interfere with everyday activities for a few days each month. Primary dysmenorrhea is menstrual cramps that last a couple of days when a female starts having menstrual periods or soon after. As a female gets older or has a baby, the cramps will usually lessen or disappear.    Secondary dysmenorrhea begins later in life and is caused by a disorder in the reproductive system. It lasts longer, and it may cause more pain than primary dysmenorrhea. The pain may start before the period and last a few days after the period.    What are the causes?    Dysmenorrhea is usually caused by an underlying problem, such as:  •Endometriosis. The tissue that lines the uterus (endometrium) growing outside of the uterus in other areas of the body.    •Adenomyosis. Endometrial tissue growing into the muscular walls of the uterus.    •Pelvic congestive syndrome. Blood vessels in the pelvis that fill with blood just before the menstrual period.    •Overgrowth of cells (polyps) in the endometrium or the lower part of the uterus (cervix).    •Uterine prolapse. The uterus dropping down into the vagina due to stretched or weak muscles.    •Blader problems, such as infection or inflammation.    •Intestinal problems, such as a tumor or irritable bowel syndrome.    •Cancer of the reproductive organs or bladder.    Other causes of this condition may result from:  •A severely tipped uterus.    •A cevix that is closed or has a small opening.    •Noncancerous (benign) tumors in the uterus (fibroids).    •Pelvic inflammatory disease (PID).    •Pelvic scarring (adhesions) from a previous surgery.    •An ovarian cyst.    •An IUD (intrauterine device).    What increases the risk?    You are more likely to develop this condition if:  •You are younger than 30 years old.    •You started puberty early.    •You have irregular or heavy bleeding.    •You have never given birth.    •You have a family history of dysmenorrhea.    •You smoke or use nicotine products.    •You have high body weight or a low body weight.    What are the signs or symptoms?    Symptoms of this condition include:  •Cramping, throbbing pain in lower abdomen or lower back, or a feeling of fullness in the lower abdomen.    •Periods lasting for longer than 7 days.    •Headaches.    •Bloating.    •Fatigue.    •Nausea or vomiting.    •Diarrhea or loose stools.    •Sweating or dizziness.    How is this diagnosed?    This condition may be diagnosed based on:  •Your symptoms.    •Your medical history.    •A physical exam.    •Blood tests.    •A Pap test. This is a test in which cells from the cervix are tested for signs of cancer or infection.    •A pregnancy test.    You may also have other tests, including:•Imaging tests, such as:  •Ultrasound.    •A procedure to remove and examine a sample of endometrial tissue (dilation and curettage, D&C).    •A procedure to visually examine the inside of:  •The uterus (hysteroscopy).    •The abdomen or pelvis (laparoscopy).    •The bladder (cystoscopy).    •X-rays.    •CT scan.    •MRI.    How is this treated?    Treatment depends on the cause of the dysmenorrhea. Treatment may include medicines, such as:  •Pain medicines.    •Hormone replacement therapy.  •Injections of progesterone to stop the menstrual period.    •Birth control pills that contain the hormone progesterone.    •An IUD that contains the hormone progesterone.    •NSAIDs, such as ibuprofen. These may help to stop the production of hormones that cause cramps.    •Antidepressant medicines.    Other treatment may include:  •Surgery to remove adhesions, endometriosis, ovarian cysts, fibroids, or the entire uterus (hysterectomy).    •Endometrial ablation. This is a procedure to destroy the endometrium.    •Presacral neurectomy. This is a procedure to cut the nerves in the bottom of the spine (sacrum) that go to the reproductive organs.    •Sacral nerve stimulation. This is a procedure to apply an electric current to nerves in the sacrum.    •Exercise and physical therapy.    •Meditation, yoga, and acupuncture.    Work with your health care provider to determine what treatment or combination of treatments is best for you.    Follow these instructions at home:    Relieving pain and cramping    •If directed, apply heat to your lower back or abdomen when you experience pain or cramps. Use the heat source that your health care provider recommends, such as a moist heat pack or a heating pad.  •Place a towel between your skin and the heat source.    •Leave the heat on for 20–30 minutes.    •Remove the heat if your skin turns bright red. This is especially important if you are unable to feel pain, heat, or cold. You may have a greater risk of getting burned.    • Do not sleep with a heating pad on.    •Exercise. Activities such as walking, swimming, or biking can help to relieve cramps.    •Massage your lower back or abdomen to help relieve pain.    General instructions     •Take over-the-counter and prescription medicines only as told by your health care provider.    •Ask your health care provider if the medicine prescribed to you requires you to avoid driving or using machinery.    •Avoid alcohol and caffeine during and right before your period. These can make cramps worse.    • Do not use any products that contain nicotine or tobacco. These products include cigarettes, chewing tobacco, and vaping devices, such as e-cigarettes. If you need help quitting, ask your health care provider.    •Keep all follow-up visits. This is important.    Contact a health care provider if:    •You have pain that gets worse or does not get better with medicine.    •You have pain with sex.    •You develop nausea or vomiting with your period that is not controlled with medicine.    Get help right away if:    •You faint. You have signs of a possible urinary tract infection.  You should continue Macrobid 100mg 2 times per day which was sent to the pharmacy.    May take Tylenol 650mg every 4 hours as needed for pain.      Dysmenorrhea refers to cramps caused by the muscles of the uterus tightening (malinda) during a menstrual period. Dysmenorrhea may be mild, or it may be severe enough to interfere with everyday activities for a few days each month. Primary dysmenorrhea is menstrual cramps that last a couple of days when a female starts having menstrual periods or soon after. As a female gets older or has a baby, the cramps will usually lessen or disappear.    Secondary dysmenorrhea begins later in life and is caused by a disorder in the reproductive system. It lasts longer, and it may cause more pain than primary dysmenorrhea. The pain may start before the period and last a few days after the period.    What are the causes?    Dysmenorrhea is usually caused by an underlying problem, such as:  •Endometriosis. The tissue that lines the uterus (endometrium) growing outside of the uterus in other areas of the body.    •Adenomyosis. Endometrial tissue growing into the muscular walls of the uterus.    •Pelvic congestive syndrome. Blood vessels in the pelvis that fill with blood just before the menstrual period.    •Overgrowth of cells (polyps) in the endometrium or the lower part of the uterus (cervix).    •Uterine prolapse. The uterus dropping down into the vagina due to stretched or weak muscles.    •Blader problems, such as infection or inflammation.    •Intestinal problems, such as a tumor or irritable bowel syndrome.    •Cancer of the reproductive organs or bladder.    Other causes of this condition may result from:  •A severely tipped uterus.    •A cevix that is closed or has a small opening.    •Noncancerous (benign) tumors in the uterus (fibroids).    •Pelvic inflammatory disease (PID).    •Pelvic scarring (adhesions) from a previous surgery.    •An ovarian cyst.    •An IUD (intrauterine device).    What increases the risk?    You are more likely to develop this condition if:  •You are younger than 30 years old.    •You started puberty early.    •You have irregular or heavy bleeding.    •You have never given birth.    •You have a family history of dysmenorrhea.    •You smoke or use nicotine products.    •You have high body weight or a low body weight.    What are the signs or symptoms?    Symptoms of this condition include:  •Cramping, throbbing pain in lower abdomen or lower back, or a feeling of fullness in the lower abdomen.    •Periods lasting for longer than 7 days.    •Headaches.    •Bloating.    •Fatigue.    •Nausea or vomiting.    •Diarrhea or loose stools.    •Sweating or dizziness.    How is this diagnosed?    This condition may be diagnosed based on:  •Your symptoms.    •Your medical history.    •A physical exam.    •Blood tests.    •A Pap test. This is a test in which cells from the cervix are tested for signs of cancer or infection.    •A pregnancy test.    You may also have other tests, including:•Imaging tests, such as:  •Ultrasound.    •A procedure to remove and examine a sample of endometrial tissue (dilation and curettage, D&C).    •A procedure to visually examine the inside of:  •The uterus (hysteroscopy).    •The abdomen or pelvis (laparoscopy).    •The bladder (cystoscopy).    •X-rays.    •CT scan.    •MRI.    How is this treated?    Treatment depends on the cause of the dysmenorrhea. Treatment may include medicines, such as:  •Pain medicines.    •Hormone replacement therapy.  •Injections of progesterone to stop the menstrual period.    •Birth control pills that contain the hormone progesterone.    •An IUD that contains the hormone progesterone.    •NSAIDs, such as ibuprofen. These may help to stop the production of hormones that cause cramps.    •Antidepressant medicines.    Other treatment may include:  •Surgery to remove adhesions, endometriosis, ovarian cysts, fibroids, or the entire uterus (hysterectomy).    •Endometrial ablation. This is a procedure to destroy the endometrium.    •Presacral neurectomy. This is a procedure to cut the nerves in the bottom of the spine (sacrum) that go to the reproductive organs.    •Sacral nerve stimulation. This is a procedure to apply an electric current to nerves in the sacrum.    •Exercise and physical therapy.    •Meditation, yoga, and acupuncture.    Work with your health care provider to determine what treatment or combination of treatments is best for you.    Follow these instructions at home:    Relieving pain and cramping    •If directed, apply heat to your lower back or abdomen when you experience pain or cramps. Use the heat source that your health care provider recommends, such as a moist heat pack or a heating pad.  •Place a towel between your skin and the heat source.    •Leave the heat on for 20–30 minutes.    •Remove the heat if your skin turns bright red. This is especially important if you are unable to feel pain, heat, or cold. You may have a greater risk of getting burned.    • Do not sleep with a heating pad on.    •Exercise. Activities such as walking, swimming, or biking can help to relieve cramps.    •Massage your lower back or abdomen to help relieve pain.    General instructions     •Take over-the-counter and prescription medicines only as told by your health care provider.    •Ask your health care provider if the medicine prescribed to you requires you to avoid driving or using machinery.    •Avoid alcohol and caffeine during and right before your period. These can make cramps worse.    • Do not use any products that contain nicotine or tobacco. These products include cigarettes, chewing tobacco, and vaping devices, such as e-cigarettes. If you need help quitting, ask your health care provider.    •Keep all follow-up visits. This is important.    Contact a health care provider if:    •You have pain that gets worse or does not get better with medicine.    •You have pain with sex.    •You develop nausea or vomiting with your period that is not controlled with medicine.    Get help right away if:    •You faint.

## 2023-01-14 NOTE — ED PROVIDER NOTE - PROGRESS NOTE DETAILS
Dr. Pineda: Pt was signed out to me awaiting ride back to group home. Pt resting in NAD but requesting more medication. Tylenol ordered. Abiel Mcguire DO (PGY1)  Dr. Pineda spoke with patients group home. Discussed what was done at today's visit. Discussed disposition of going back to group home. Pain likely 2/2 to menstrual period. Abiel Mcguire DO (PGY1)  Dr. Pineda spoke with patients group home. Discussed what was done at today's visit. Discussed disposition of going back to group home. Pain likely 2/2 to menstrual period. Patient pending p/u from [facility] 9AM. Dr. Pineda: Pt states she is feeling upset about her endometriosis. Pt requesting another shot of Thorazine that helps calm her down. Dr. Pineda: Pt now calm and transport is here. Advised rest and plenty of fluids. Sent Macrobid to pharmacy.

## 2023-01-14 NOTE — ED PROVIDER NOTE - PATIENT PORTAL LINK FT
You can access the FollowMyHealth Patient Portal offered by [facility] by registering at the following website: [email or web address]. By joining [employer]’s FollowMyHealth portal, you will also be able to view your health information using other applications (apps) compatible with our system. You can access the FollowMyHealth Patient Portal offered by [facility] by registering at the following website: [email or web address]. By joining [facility] [employer]’s FollowMyHealth portal, you will also be able to view your health information using other applications (apps) compatible with our system.

## 2023-01-14 NOTE — ED PROVIDER NOTE - CLINICAL SUMMARY MEDICAL DECISION MAKING FREE TEXT BOX
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 on presentation patient in apparent discomfort. symptoms likely due to dysmenorrhea. plan for routine labs to r/o anemia, analgesic and reassessment.

## 2023-01-18 ENCOUNTER — APPOINTMENT (OUTPATIENT)
Dept: NEUROLOGY | Facility: CLINIC | Age: 38
End: 2023-01-18

## 2023-01-18 ENCOUNTER — APPOINTMENT (OUTPATIENT)
Dept: NEUROLOGY | Facility: CLINIC | Age: 38
End: 2023-01-18
Payer: MEDICARE

## 2023-01-18 ENCOUNTER — EMERGENCY (EMERGENCY)
Facility: HOSPITAL | Age: 38
LOS: 1 days | Discharge: HOME | End: 2023-01-18
Attending: EMERGENCY MEDICINE | Admitting: EMERGENCY MEDICINE
Payer: MEDICARE

## 2023-01-18 VITALS
RESPIRATION RATE: 16 BRPM | HEIGHT: 68 IN | HEART RATE: 114 BPM | DIASTOLIC BLOOD PRESSURE: 84 MMHG | TEMPERATURE: 98 F | SYSTOLIC BLOOD PRESSURE: 131 MMHG | OXYGEN SATURATION: 99 %

## 2023-01-18 VITALS
HEIGHT: 68 IN | WEIGHT: 279 LBS | HEART RATE: 97 BPM | BODY MASS INDEX: 42.28 KG/M2 | SYSTOLIC BLOOD PRESSURE: 125 MMHG | DIASTOLIC BLOOD PRESSURE: 85 MMHG

## 2023-01-18 PROCEDURE — 99284 EMERGENCY DEPT VISIT MOD MDM: CPT

## 2023-01-18 PROCEDURE — 99213 OFFICE O/P EST LOW 20 MIN: CPT

## 2023-01-18 NOTE — ED ADULT TRIAGE NOTE - CHIEF COMPLAINT QUOTE
Pt arrives to ED [facility].  Pt complains of infection to rt upper arm s/p injection.  Area is red, warm and hurts when touched.  Pt arrives with Aide.  Pt calm and cooperative in triage.

## 2023-01-19 ENCOUNTER — NON-APPOINTMENT (OUTPATIENT)
Age: 38
End: 2023-01-19

## 2023-01-19 VITALS
DIASTOLIC BLOOD PRESSURE: 93 MMHG | RESPIRATION RATE: 16 BRPM | SYSTOLIC BLOOD PRESSURE: 126 MMHG | TEMPERATURE: 99 F | OXYGEN SATURATION: 99 % | HEART RATE: 73 BPM

## 2023-01-19 LAB
ALBUMIN SERPL ELPH-MCNC: 4.5 G/DL — SIGNIFICANT CHANGE UP (ref 3.3–5)
ALP SERPL-CCNC: 82 U/L — SIGNIFICANT CHANGE UP (ref 40–120)
ALT FLD-CCNC: 27 U/L — SIGNIFICANT CHANGE UP (ref 4–33)
ANION GAP SERPL CALC-SCNC: 12 MMOL/L — SIGNIFICANT CHANGE UP (ref 7–14)
AST SERPL-CCNC: 16 U/L — SIGNIFICANT CHANGE UP (ref 4–32)
BASOPHILS # BLD AUTO: 0.03 K/UL — SIGNIFICANT CHANGE UP (ref 0–0.2)
BASOPHILS NFR BLD AUTO: 0.4 % — SIGNIFICANT CHANGE UP (ref 0–2)
BILIRUB SERPL-MCNC: <0.2 MG/DL — SIGNIFICANT CHANGE UP (ref 0.2–1.2)
BUN SERPL-MCNC: 12 MG/DL — SIGNIFICANT CHANGE UP (ref 7–23)
CALCIUM SERPL-MCNC: 10 MG/DL — SIGNIFICANT CHANGE UP (ref 8.4–10.5)
CHLORIDE SERPL-SCNC: 103 MMOL/L — SIGNIFICANT CHANGE UP (ref 98–107)
CO2 SERPL-SCNC: 24 MMOL/L — SIGNIFICANT CHANGE UP (ref 22–31)
CREAT SERPL-MCNC: 0.77 MG/DL — SIGNIFICANT CHANGE UP (ref 0.5–1.3)
EGFR: 103 ML/MIN/1.73M2 — SIGNIFICANT CHANGE UP
EOSINOPHIL # BLD AUTO: 0.02 K/UL — SIGNIFICANT CHANGE UP (ref 0–0.5)
EOSINOPHIL NFR BLD AUTO: 0.3 % — SIGNIFICANT CHANGE UP (ref 0–6)
GLUCOSE SERPL-MCNC: 94 MG/DL — SIGNIFICANT CHANGE UP (ref 70–99)
HCT VFR BLD CALC: 33.7 % — LOW (ref 34.5–45)
HGB BLD-MCNC: 10.7 G/DL — LOW (ref 11.5–15.5)
IANC: 4.54 K/UL — SIGNIFICANT CHANGE UP (ref 1.8–7.4)
IMM GRANULOCYTES NFR BLD AUTO: 0.1 % — SIGNIFICANT CHANGE UP (ref 0–0.9)
LYMPHOCYTES # BLD AUTO: 2.3 K/UL — SIGNIFICANT CHANGE UP (ref 1–3.3)
LYMPHOCYTES # BLD AUTO: 30.7 % — SIGNIFICANT CHANGE UP (ref 13–44)
MCHC RBC-ENTMCNC: 29.1 PG — SIGNIFICANT CHANGE UP (ref 27–34)
MCHC RBC-ENTMCNC: 31.8 GM/DL — LOW (ref 32–36)
MCV RBC AUTO: 91.6 FL — SIGNIFICANT CHANGE UP (ref 80–100)
MONOCYTES # BLD AUTO: 0.6 K/UL — SIGNIFICANT CHANGE UP (ref 0–0.9)
MONOCYTES NFR BLD AUTO: 8 % — SIGNIFICANT CHANGE UP (ref 2–14)
NEUTROPHILS # BLD AUTO: 4.54 K/UL — SIGNIFICANT CHANGE UP (ref 1.8–7.4)
NEUTROPHILS NFR BLD AUTO: 60.5 % — SIGNIFICANT CHANGE UP (ref 43–77)
NRBC # BLD: 0 /100 WBCS — SIGNIFICANT CHANGE UP (ref 0–0)
NRBC # FLD: 0 K/UL — SIGNIFICANT CHANGE UP (ref 0–0)
PLATELET # BLD AUTO: 223 K/UL — SIGNIFICANT CHANGE UP (ref 150–400)
POTASSIUM SERPL-MCNC: 4 MMOL/L — SIGNIFICANT CHANGE UP (ref 3.5–5.3)
POTASSIUM SERPL-SCNC: 4 MMOL/L — SIGNIFICANT CHANGE UP (ref 3.5–5.3)
PROT SERPL-MCNC: 7.9 G/DL — SIGNIFICANT CHANGE UP (ref 6–8.3)
RBC # BLD: 3.68 M/UL — LOW (ref 3.8–5.2)
RBC # FLD: 13.2 % — SIGNIFICANT CHANGE UP (ref 10.3–14.5)
SODIUM SERPL-SCNC: 139 MMOL/L — SIGNIFICANT CHANGE UP (ref 135–145)
WBC # BLD: 7.5 K/UL — SIGNIFICANT CHANGE UP (ref 3.8–10.5)
WBC # FLD AUTO: 7.5 K/UL — SIGNIFICANT CHANGE UP (ref 3.8–10.5)

## 2023-01-19 PROCEDURE — 73060 X-RAY EXAM OF HUMERUS: CPT | Mod: 26,RT

## 2023-01-19 PROCEDURE — 76881 US COMPL JOINT R-T W/IMG: CPT | Mod: 26,RT

## 2023-01-19 RX ORDER — DIAZEPAM 5 MG
2 TABLET ORAL ONCE
Refills: 0 | Status: DISCONTINUED | OUTPATIENT
Start: 2023-01-19 | End: 2023-01-19

## 2023-01-19 RX ORDER — ACETAMINOPHEN 500 MG
650 TABLET ORAL ONCE
Refills: 0 | Status: COMPLETED | OUTPATIENT
Start: 2023-01-19 | End: 2023-01-19

## 2023-01-19 RX ORDER — SODIUM CHLORIDE 9 MG/ML
1000 INJECTION INTRAMUSCULAR; INTRAVENOUS; SUBCUTANEOUS ONCE
Refills: 0 | Status: COMPLETED | OUTPATIENT
Start: 2023-01-19 | End: 2023-01-19

## 2023-01-19 RX ADMIN — Medication 2 MILLIGRAM(S): at 06:35

## 2023-01-19 RX ADMIN — SODIUM CHLORIDE 1000 MILLILITER(S): 9 INJECTION INTRAMUSCULAR; INTRAVENOUS; SUBCUTANEOUS at 03:45

## 2023-01-19 RX ADMIN — Medication 650 MILLIGRAM(S): at 03:45

## 2023-01-19 NOTE — ED PROVIDER NOTE - PHYSICAL EXAMINATION
Physical Exam:  General: NAD, Conversive  Eyes: EOMI, Conjunctiva and sclera clear  Neck: No JVD  Lungs: Clear to auscultation bilaterally, no wheeze, no rhonchi  Heart: Normal S1, S2, no murmurs  Abdomen: Soft, nontender, nondistended, no CVA tenderness  Extremities: 2+ peripheral pulses, no edema, + Mild R. upper arm swelling over the site no overlying erythema,   Psych: AAO X3  Neurologic: Non-focal

## 2023-01-19 NOTE — ED PROVIDER NOTE - OBJECTIVE STATEMENT
37-year-old female history of factor V Leiden, asthma, endometriosis, explosive personality disorder, moderate cognitive delay, presenting status post Thorazine injection intramuscular from 13 January with swelling and pain at the site.  States that since he was last in the emergency department for Thorazine injection has been experiencing mild swelling of the right upper arm, concern for potential infection.  In setting of prior history of injections having abscesses.  Denies any nausea, vomiting, difficulty breathing, shortness of breath, abdominal pain, chills.

## 2023-01-19 NOTE — ED PROVIDER NOTE - NSFOLLOWUPINSTRUCTIONS_ED_ALL_ED_FT
Advance activity as tolerated.  Continue all previously prescribed medications as directed unless otherwise instructed.  Follow up with your primary care physician in 48-72 hours- bring copies of your results.  Return to the ER for worsening or persistent symptoms, and/or ANY NEW OR CONCERNING SYMPTOMS. If you have issues obtaining follow up, please call: [phone] to obtain a doctor or specialist who takes your insurance in your area.  You may call [phone] to make an appointment with the internal medicine clinic.    Take tylenol 650 mg every 6-8 hours for pain.    Followup with your primary care doctor, if you experience any fever, chills, nausea, vomiting or other worsening symptoms return to the hospital.

## 2023-01-19 NOTE — ED PROVIDER NOTE - PROGRESS NOTE DETAILS
Raul Tipton MD:  US demonstrates no clear fluid collection, no ocncern for infectious etiology at this time, bw nonactionable, stable for DC with primary followup and tylenol recs. Raul Tipton MD:  Patient requesting escalating doses of pain medication, oxycodone, becoming mildly agitated in this setting, will provide 1 mg ativan for anxiety and pain.

## 2023-01-19 NOTE — PROVIDER CONTACT NOTE (OTHER) - ACTION/TREATMENT ORDERED:
Staff at [facility] she will accompany pt back to residence.  Will travel via taxi, accompanied by staff.  [employer] taxi ordered.

## 2023-01-19 NOTE — ED PROVIDER NOTE - PATIENT PORTAL LINK FT
You can access the FollowMyHealth Patient Portal offered by [facility] by registering at the following website: [email or web address]. By joining [employer]’s FollowMyHealth portal, you will also be able to view your health information using other applications (apps) compatible with our system.

## 2023-01-19 NOTE — ED PROVIDER NOTE - NS ED ROS FT
GENERAL: No fever or chills  EYES: No change in vision  HEENT: No trouble swallowing or speaking  CARDIAC: No chest pain  PULMONARY: No cough or SOB  GI: No abdominal pain, no nausea or no vomiting, no diarrhea or constipation  : No changes in urination  SKIN: No rashes  NEURO: No headache, no numbness  MSK: + R. arm pain  Otherwise as HPI or negative.

## 2023-01-19 NOTE — ED ADULT NURSE NOTE - OBJECTIVE STATEMENT
Pt. presented to room 14. Pt. from [facility] p/w staff. A&Ox4 ambulatory. c/o RUE pain swelling and redness d/t IM injection she had on 1/13. Pt. states pain & swelling got worse. lump under skin noted to rue. Pt. states this happened once last year and had to have I&D. denies abd pain n/v/d fever chills CP SOB. RAC20G labs sent medicate per order

## 2023-01-19 NOTE — PROVIDER CONTACT NOTE (OTHER) - ASSESSMENT
RAVINDRA called group home multiple times at [phone]; no answer; unable to leave a message.  Also called  [facility] at [phone]-no answer; RAVINDRA left a voice mail requesting a return phone call.

## 2023-01-19 NOTE — ED PROVIDER NOTE - CLINICAL SUMMARY MEDICAL DECISION MAKING FREE TEXT BOX
37-year-old female presenting with arm pain after IM injection 7 days ago.  Low clinical concern for infectious etiology, arm is nonerythematous, no significant swelling, no crepitus, no fluctuance, in setting of mild tachycardia when patient presented will perform basic labs with CBC CMP, give normal saline, ultrasound point-of-care for evaluation for fluctuance or fluid collection.  Likely disposition to discharge back to facility with primary follow-up.  Documentations from last 2 ER visits evaluated during this assessment and plan. Sera att: 37-year-old female presenting with arm pain after IM injection 7 days ago.  Low clinical concern for infectious etiology, arm is nonerythematous, no significant swelling, no crepitus, no fluctuance, in setting of mild tachycardia when patient presented will perform basic labs with CBC CMP, give normal saline, ultrasound point-of-care for evaluation for fluctuance or fluid collection.  Likely disposition to discharge back to facility with primary follow-up.  Documentations from last 2 ER visits evaluated during this assessment and plan.

## 2023-01-20 NOTE — HISTORY OF PRESENT ILLNESS
[FreeTextEntry1] : meds-  albuterol prn, arapiprazole 30mg/d, \par levothyoxine 75mcg/d, quetiapine -, cholpromazine 50mg qid\par Eliquis 2.5mg/d, synthroid , lorazeapam 0.5 mg bid\par OXC 150mg bid started recently, \par baclofen 10mg tid, claritin prn, melatonin 3mg, \par \par Trials: Latuda 40mg/d, /1000mg.  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 At age [age] was dropped and was told she suffered brain injury. Believes she was on seizure meds until age 19- not clear how well controlled.\par Age [age] believes she started having seizures. Patient believes she was essentially seizure free from age 19 until 2017 off seizure meds. In 2017 had at least one or more "seizures" and was taken to [facility].\par Has been at her present residential facility since Sept of 2021.\par \par c/o auras whenever stressed. Has a butterfly feeling in her abdomen that rises.\par In 2020 states she was doing well on Keppra but was admitted to [facility] for GI discomfort and switched to VPA c/o hair loss and weight gain. Likely was switched for behavioral reasons \par \par Was admitted to [facility] twice with "seizures," once intubated and a week later for abdominal pain and a ?4 minute seizure. 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 EEG with beta but no epileptiform discharges \par Quetiapine was increased for agitation.\par \par EMU admission 7.2022: EEG /video showed episodes of  rocking, atonia, unresponsiveness - appear psychogenic nonepileptic.  valproate  -1000 bid stopped, now on OXC 150mg bid.\par \par Psychiatrist - [phone] Dr. Weiner.\par \par MRI head 3/2022 scattered WM changes\par

## 2023-01-20 NOTE — DISCUSSION/SUMMARY
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 \par Paroxysmal anxiety / abdominal fluttering. \par Mood / bipolar disorder\par \par EMU [facility] 7/2022 results consistent with Psychogenic Nonepileptic Attacks.  Debriefed.\par No evidence for seizures/spikes interictally.\par Recommend AED only for migraine/mood stabilization.\par Interestingly, off VPA/TPM reports no HAs, and no mood worsening.\par \par Plan:\par continue Oxcarbazepine 150 mg BID\par continue follow up with psychology/psychiatry recommended\par monitor Na levels on OXC/neuroleptics ie q3mo.  Some risk of SIADH\par follow up in 6 months

## 2023-01-22 NOTE — ED POST DISCHARGE NOTE - ADDITIONAL DOCUMENTATION
nurse  Renzo Pineda called from [facility], for pt's urine culture. according to chart review, UC was ordered but not sent. faxed the history to ALEYDA Pineda, and advised him to repeat pt's UA s/p abx. Edwin states that pt is seeing her PCP tomorrow.

## 2023-01-24 ENCOUNTER — NON-APPOINTMENT (OUTPATIENT)
Age: 38
End: 2023-01-24

## 2023-01-24 DIAGNOSIS — G40.109 LOCALIZATION-RELATED (FOCAL) (PARTIAL) SYMPTOMATIC EPILEPSY AND EPILEPTIC SYNDROMES WITH SIMPLE PARTIAL SEIZURES, NOT INTRACTABLE, W/OUT STATUS EPILEPTICUS: ICD-10-CM

## 2023-02-03 ENCOUNTER — NON-APPOINTMENT (OUTPATIENT)
Age: 38
End: 2023-02-03

## 2023-02-03 ENCOUNTER — EMERGENCY (EMERGENCY)
Facility: HOSPITAL | Age: 38
LOS: 1 days | Discharge: HOME | End: 2023-02-03
Attending: EMERGENCY MEDICINE | Admitting: EMERGENCY MEDICINE
Payer: MEDICARE

## 2023-02-03 VITALS
OXYGEN SATURATION: 100 % | DIASTOLIC BLOOD PRESSURE: 71 MMHG | SYSTOLIC BLOOD PRESSURE: 100 MMHG | TEMPERATURE: 98 F | HEART RATE: 98 BPM | RESPIRATION RATE: 18 BRPM

## 2023-02-03 VITALS
TEMPERATURE: 98 F | OXYGEN SATURATION: 100 % | HEART RATE: 120 BPM | SYSTOLIC BLOOD PRESSURE: 127 MMHG | HEIGHT: 68 IN | RESPIRATION RATE: 17 BRPM | DIASTOLIC BLOOD PRESSURE: 82 MMHG

## 2023-02-03 PROCEDURE — 90792 PSYCH DIAG EVAL W/MED SRVCS: CPT

## 2023-02-03 PROCEDURE — 99284 EMERGENCY DEPT VISIT MOD MDM: CPT

## 2023-02-03 RX ORDER — DIPHENHYDRAMINE HCL 50 MG
25 CAPSULE ORAL ONCE
Refills: 0 | Status: COMPLETED | OUTPATIENT
Start: 2023-02-03 | End: 2023-02-03

## 2023-02-03 RX ORDER — HALOPERIDOL DECANOATE 100 MG/ML
5 INJECTION INTRAMUSCULAR ONCE
Refills: 0 | Status: COMPLETED | OUTPATIENT
Start: 2023-02-03 | End: 2023-02-03

## 2023-02-03 RX ORDER — CHLORPROMAZINE HCL 10 MG
50 TABLET ORAL ONCE
Refills: 0 | Status: COMPLETED | OUTPATIENT
Start: 2023-02-03 | End: 2023-02-03

## 2023-02-03 RX ADMIN — Medication 25 MILLIGRAM(S): at 10:47

## 2023-02-03 RX ADMIN — Medication 50 MILLIGRAM(S): at 12:45

## 2023-02-03 RX ADMIN — Medication 2 MILLIGRAM(S): at 10:47

## 2023-02-03 RX ADMIN — HALOPERIDOL DECANOATE 5 MILLIGRAM(S): 100 INJECTION INTRAMUSCULAR at 10:47
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
## 2023-02-03 NOTE — ED BEHAVIORAL HEALTH NOTE - BEHAVIORAL HEALTH NOTE
Writer was informed pt will be medically and psychiatrically cleared for discharged.  Writer called pt's residence  spoke to Silvestre Melendez informed them pt will be discharged.  She confirmed pt travels with ambulance supervision to [address] [place] [address].  Writer also informed agency nurse Fabi  pt will be discharged.  Nurse provided medication list given to provider and nurse confirmed pt received morning medication.      Writer arranged [facility] transport Jt Marte's Trip  Date: 02/03/2023 Time: call Inv# [id number]  Reason for Trip  Discharge    Medicaid ID (DEION #)  [id number]    [facility] Address  [address] [place] [address]    [facility] Phone  [phone]    Contact Name  Cammy Vazquez    Contact Phone  [phone]    Transport  [facility]    Transport Phone  [phone]    Medical Provider  [facility]    Phone  [phone]    Transport Type  Ambulance-BLS    Trip Legs  Weight  250-300    Additional Matt(s)  NO    Stairs  Less Than 5    On Ventilator  NO    Other:  Adult Emergency     Pickup Address  [address] [place] [address]    Apt/[address]  EMERGENCY ROOM    Destination Address  [address] [place] [address]    Apt/Suite  Pickup Time  4:30pm

## 2023-02-03 NOTE — ED BEHAVIORAL HEALTH ASSESSMENT NOTE - DESCRIPTION
as per HPI Initially uncooperative in ER and received Haldol 5mg, Ativan 2mg and Benadryl 25mg IM at 1047am. She then requested more medications and was given Thorazine 50mg IM voluntarily.  Vital Signs Last 24 Hrs  T(C): 36.2 (03 Feb 2023 12:18), Max: 36.7 (03 Feb 2023 10:13)  T(F): 97.2 (03 Feb 2023 12:18), Max: 98 (03 Feb 2023 10:13)  HR: 117 (03 Feb 2023 12:18) (117 - 120)  BP: 116/89 (03 Feb 2023 12:18) (116/89 - 127/82)  BP(mean): --  RR: 18 (03 Feb 2023 12:18) (17 - 18)  SpO2: 100% (03 Feb 2023 12:18) (100% - 100%)    Parameters below as of 03 Feb 2023 12:18  Patient On (Oxygen Delivery Method): room air
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
## 2023-02-03 NOTE — ED PROVIDER NOTE - PATIENT PORTAL LINK FT
You can access the FollowMyHealth Patient Portal offered by [facility] by registering at the following website: [email or web address]. By joining [facility]’s FollowMyHealth portal, you will also be able to view your health information using other applications (apps) compatible with our system.

## 2023-02-03 NOTE — ED ADULT NURSE NOTE - OBJECTIVE STATEMENT
Received pt in  pt irritable denies si/hi/avh presently, as per EMS pt was threatening staff at group home pt uncooperative verbal deescalation unsuccessful. Pt medicated as per MD rx, safety & comfort measures maintained eval on going.
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
## 2023-02-03 NOTE — ED PROVIDER NOTE - PROGRESS NOTE DETAILS
Dr. Dela Cruz: spoke with pt at length, along with PES and security present, to ask pt to change into hospital gown; pt not wanting to change, becoming more agitated, crying loudly; I attempted to verbally de-escalate, then offered pt PO medication to help her relax, but she declines; pt continued to not change into hospital gown and not allow her belongings to be secured by security, and it was necessary to give pt medication IM to treat her escalating agitation, for safety of pt and staff; pt received medication without issues Dr. Dela Cruz: went to re-evaluate pt after she received medication Dr. Dela Cruz: went to re-evaluate pt after she received medication to treat her agitation; pt more calm now, but still stating repeatedly that she wants to murder the aide that she was speaking to this morning; will have pt evaluated by psychiatry Dr. Dela Cruz: pt cleared for discharge after psychiatric evaluation; more calm in ED

## 2023-02-03 NOTE — ED BEHAVIORAL HEALTH NOTE - BEHAVIORAL HEALTH NOTE
writer called [facility] ([phone]) and spoke to Vidya who states they do not take health first insurance. Patient needs safe transport back to . Worker arranged for transport via [facility] via bill back  trip #[phone] a.  within an hour. Pt's weight provided to vendor.

## 2023-02-03 NOTE — ED PROVIDER NOTE - TEST CONSIDERED BUT NOT PERFORMED
Tests Considered But Not Performed no indication for labs, as pt with no evidence of acute medical condition on medical screening exam

## 2023-02-03 NOTE — ED PROVIDER NOTE - CHIEF COMPLAINT
Patient transported to unit in stable condition. A&Ox4, gait steady. IVF infusing on transfer at 100ml/hr. All belongings sent with patient.    The patient is a 37y Female complaining of psychiatric evaluation.

## 2023-02-03 NOTE — ED ADULT NURSE NOTE - NSFALLRSKOUTCOME_ED_ALL_ED
----- Message from James Allen sent at 3/30/2020  8:48 AM CDT -----  Contact: Patient [phone]  RED [employer]....    Patient would like to get medical advice.  Symptoms (please be specific):  Read below  How long has patient had these symptoms: week    [id number] [address] [facility] [phone] (Phone) [phone] (Fax)    Comments: Lost of appetite, heavy cough, diarrhea, want to know if something can be called , started a week ago,. Patient [phone]      Please call an advise  Thank you     [employer] Safety [employer]

## 2023-02-03 NOTE — ED PROVIDER NOTE - CONSIDERATION OF ADMISSION OBSERVATION
pt with clear medical screening exam and no indication for inpatient psychiatric admission--cleared for discharge with outpatient follow up Consideration of Admission/Observation

## 2023-02-03 NOTE — ED BEHAVIORAL HEALTH ASSESSMENT NOTE - CURRENT MEDICATION
FROM 06/2022 - (AWAITING RETURN PHONE CALL FROM [facility] FOR AN UPDATED LIST)    Percocet 5 mg-325 mg oral tablet: 1 tab(s) orally every 6 hours, As needed, Severe Pain (7 - 10) MDD:4  · 	levothyroxine 75 mcg (0.075 mg) oral tablet: 1 tab(s) orally once a day  · 	pantoprazole 40 mg oral delayed release tablet: 1 tab(s) orally once a day (before a meal)  · 	menthol-benzocaine 3.6 mg-15 mg mucous membrane lozenge: 1 lozenge mucous membrane every 4 hours as needed  · 	albuterol 90 mcg/inh inhalation aerosol: 2 puff(s) inhaled every 6 hours, As needed, Shortness of Breath and/or Wheezing  · 	melatonin 3 mg oral tablet: 1 tab(s) orally once a day (at bedtime)  · 	Ativan 0.5 mg oral tablet: 1 tab(s) orally 2 times a day  [10AM and 4PM] MDD:1mg  · 	SEROquel 200 mg oral tablet: 1 tab(s) orally 2 times a day [8AM and 8PM]  · 	tiZANidine 2 mg oral tablet: 1 tab(s) orally 2 times a day, As Needed -Muscle Spasm    · 	Ala-Maximo 1% topical cream: 1 application topically once  · 	diphenhydrAMINE 25 mg oral capsule: 1 cap(s) orally every 6 hours, As needed, Rash and/or Itching  · 	QUEtiapine 50 mg oral tablet: 1 tab(s) orally once a day at noon  · 	chlorproMAZINE 50 mg oral tablet: 1 tab(s) orally every 6 hours, As needed, agitation/aggression  · 	lurasidone 40 mg oral tablet: 1 tab(s) orally once a day  · 	ARIPiprazole 30 mg oral tablet: 1 tab(s) orally once a day  · 	Depakote  mg oral tablet, extended release: 3 tab(s) orally once a day 500 mg in AM and 1000mg bedtime  · 	Eliquis 5 mg oral tablet: 1 tab(s) orally 2 times a day   · 	mometasone 220 mcg/inh inhalation aerosol powder: 1 puff(s) inhaled once a day  · 	Claritin 10 mg oral tablet: 1 tab(s) orally once a day  · 	Multiple Vitamins with Iron oral tablet: 1 tab(s) orally once a day  · 	fluticasone 50 mcg/inh nasal spray: 1 spray(s) nasal 2 times a day  · 	Tylenol 325 mg oral tablet: 2 tab(s) orally every 6 hours, As Needed  · 	olopatadine 0.1% ophthalmic solution: 1 drop(s) to each affected eye every 6 hours, As Needed  · 	Vitamin D2 1.25 mg (50,000 intl units) oral capsule: 1 cap(s) orally once a week  · 	Flovent 110 mcg/inh inhalation aerosol with adapter: 1 cap(s) inhaled once a day Updated med list - 02/03/2023 by staff Fabi MAYNARD  Atorvastatin 80mg po qhs  Thorazine 50mg po tid  Elaquis 5mg po bid  Synthroid 75mcg daily  Melatonin 3mg po qhs  Multivitamin daily  Protonix 40mg po daily  Seroquel 200mg po DAily 50mg po Noon  Trileptal 150mg po bid
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
## 2023-02-03 NOTE — ED BEHAVIORAL HEALTH ASSESSMENT NOTE - RISK ASSESSMENT
Risk Factors: hx of suicide attempt, chronically limited impulse control, poor frustration tolerance, hx of aggressive  Protective Factors; denies suicidal ideation, able to name distraction techniques, adherent with treatment, supervised residence, no SI/I/P  Pt is at baseline and should continue working with outpatient team to work on distraction techniques.

## 2023-02-03 NOTE — ED BEHAVIORAL HEALTH ASSESSMENT NOTE - DETAILS
as per HPI, extensive hx of making threats in response to dissatisfaction +full body aches Reports 2 past SAs, last in 2017 stating "I took a lot of pills to try to harm myself" affirming intent to die. staff member aware pt identifies getting angry with staff and isolating herself as warning signs. she watches tv, talks with her roommate as a distraction. Names her family as protective factors.

## 2023-02-03 NOTE — BH SAFETY PLAN - SUICIDE AND CRISIS LIFELINE, CALL [phone]
[FreeTextEntry1] : General:  Awake and alert in no acute distress, \par Psych: normal mood and affect. \par HEENT: NC/AT, normal visual tracking\par Pulmonary: no resp distress, chest expansion appears symmetrical\par CV: extremities are warm and perfused\par Abd: non-distended\par Ext: no c/c/e\par \par \par CERVICAL SPINE REGION:\par Inspection, cervical: normal, no listing of the head, no gross asymmetries.\par \par Active ROM of the cervical spine: (Estimated range):  \par Full ROM except as below,  pain in all planes at base of cervical spine.\par Lateral flexion limited - 70% of full\par 		\par Palpation:  Tender at Cervical paraspinals, right worse than left.\par 		\par Reflexes: Upper limbs:		RIGHT	   LEFT	\par Biceps	C5-6		                2+	    2+\par Brachioradialis	C5-6		2+                2+\par Triceps	C(6)7-8(1)		2+	    2+\par 		\par Strength, upper limbs: 	\par 5/5 in SA, EF, EE, WE, ADM, APB bilaterally \par 		\par Sensation: Upper limbs:\par Intact to pinprick over C3-T1 bilateral UE dermatomes \par 		\par Tests for cervical radiculopathy/myelopathy: 	\par Spurling’s sign: negative bilaterally\par \par Long tract signs for myelopathy/UMN process: \par UMN Sign	                           \par Navarrete sign: negative bilaterally			\par \par Shoulder: \par full ROM, negative impingement signs.
Suicide and Crisis Lifeline, call [phone]
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
## 2023-02-10 ENCOUNTER — APPOINTMENT (OUTPATIENT)
Dept: PEDIATRIC ALLERGY IMMUNOLOGY | Facility: CLINIC | Age: 38
End: 2023-02-10
Payer: MEDICARE

## 2023-02-10 ENCOUNTER — OUTPATIENT (OUTPATIENT)
Dept: OUTPATIENT SERVICES | Facility: HOSPITAL | Age: 38
LOS: 1 days | End: 2023-02-10
Payer: COMMERCIAL

## 2023-02-10 VITALS
DIASTOLIC BLOOD PRESSURE: 85 MMHG | SYSTOLIC BLOOD PRESSURE: 114 MMHG | OXYGEN SATURATION: 99 % | BODY MASS INDEX: 41.97 KG/M2 | HEART RATE: 98 BPM | WEIGHT: 276 LBS

## 2023-02-10 DIAGNOSIS — Z88.9 ALLERGY STATUS TO UNSPECIFIED DRUGS, MEDICAMENTS AND BIOLOGICAL SUBSTANCES: ICD-10-CM

## 2023-02-10 DIAGNOSIS — J30.9 ALLERGIC RHINITIS, UNSPECIFIED: ICD-10-CM

## 2023-02-10 PROCEDURE — 90791 PSYCH DIAGNOSTIC EVALUATION: CPT

## 2023-02-10 PROCEDURE — ZZZZZ: CPT | Mod: GC

## 2023-02-10 PROCEDURE — 95079 INGEST CHALLENGE ADDL 60 MIN: CPT

## 2023-02-13 DIAGNOSIS — Z88.9 ALLERGY STATUS TO UNSPECIFIED DRUGS, MEDICAMENTS AND BIOLOGICAL SUBSTANCES: ICD-10-CM

## 2023-02-14 ENCOUNTER — NON-APPOINTMENT (OUTPATIENT)
Age: 38
End: 2023-02-14

## 2023-02-14 ENCOUNTER — APPOINTMENT (OUTPATIENT)
Dept: CARDIOLOGY | Facility: CLINIC | Age: 38
End: 2023-02-14
Payer: MEDICARE

## 2023-02-14 VITALS
HEIGHT: 68 IN | DIASTOLIC BLOOD PRESSURE: 80 MMHG | SYSTOLIC BLOOD PRESSURE: 113 MMHG | TEMPERATURE: 207.5 F | WEIGHT: 278 LBS | HEART RATE: 111 BPM | OXYGEN SATURATION: 96 % | BODY MASS INDEX: 42.13 KG/M2

## 2023-02-14 DIAGNOSIS — R00.2 PALPITATIONS: ICD-10-CM

## 2023-02-14 PROCEDURE — 99213 OFFICE O/P EST LOW 20 MIN: CPT | Mod: 25

## 2023-02-14 PROCEDURE — 93000 ELECTROCARDIOGRAM COMPLETE: CPT

## 2023-02-14 NOTE — REASON FOR VISIT
[Symptom and Test Evaluation] : symptom and test evaluation [FreeTextEntry1] : Generally doing well, no CP or dyspnea.\par Echo with normal biventricular function.\par \par 1. Mild tachycardia. Likely deconditioning or direct anti-cholinergic effects of her medications.  No primary cardiac dysfunction or arrhythmia. No further w/u. f/u as needed.

## 2023-02-14 NOTE — ASSESSMENT
[FreeTextEntry1] : 1. Mild tachycardia. Likely deconditioning or direct anti-cholinergic effects of her medications.  No primary cardiac dysfunction or arrhythmia. No further w/u. f/u as needed.

## 2023-02-15 NOTE — PHYSICAL EXAM
[Alert] : alert [Well Nourished] : well nourished [Healthy Appearance] : healthy appearance [No Acute Distress] : no acute distress [Well Developed] : well developed [Normal Pupil & Iris Size/Symmetry] : normal pupil and iris size and symmetry [No Discharge] : no discharge [No Photophobia] : no photophobia [Sclera Not Icteric] : sclera not icteric [Normal TMs] : both tympanic membranes were normal [Normal Nasal Mucosa] : the nasal mucosa was normal [Normal Lips/Tongue] : the lips and tongue were normal [Normal Outer Ear/Nose] : the ears and nose were normal in appearance [Normal Tonsils] : normal tonsils [No Thrush] : no thrush [Pale mucosa] : pale mucosa [Supple] : the neck was supple [Normal Rate and Effort] : normal respiratory rhythm and effort [No Crackles] : no crackles [No Retractions] : no retractions [Bilateral Audible Breath Sounds] : bilateral audible breath sounds [Normal Rate] : heart rate was normal  [Normal S1, S2] : normal S1 and S2 [No murmur] : no murmur [Regular Rhythm] : with a regular rhythm [Soft] : abdomen soft [Not Tender] : non-tender [Not Distended] : not distended [No HSM] : no hepato-splenomegaly [Normal Cervical Lymph Nodes] : cervical [Skin Intact] : skin intact  [No Rash] : no rash [No Skin Lesions] : no skin lesions [No clubbing] : no clubbing [No Edema] : no edema [No Cyanosis] : no cyanosis [Normal Mood] : mood was normal [Normal Affect] : affect was normal [Alert, Awake, Oriented as Age-Appropriate] : alert, awake, oriented as age appropriate [Conjunctival Erythema] : no conjunctival erythema [Suborbital Bogginess] : no suborbital bogginess (allergic shiners) [Wheezing] : no wheezing was heard [Urticaria] : no urticaria

## 2023-02-15 NOTE — PROCEDURE
[Patient ingested ___ amount of allergen] : Patient ingested [unfilled] amount of allergen [Pass] : Challenge: Pass [FreeTextEntry1] : Patient tolerated acetaminophen-codeine #2 (300mg-15mg). Patient is not allergic to codeine. Advised to avoid codeine for rest of today and monitor for any delayed reactions. \par If patient needs morphine in the future, can come back for a challenge. \par \par Continue strict avoidance of Penicillin, Toradol, Trazodone, Tramadol, and Zofran due to risk of life-threatening allergic reaction (anaphylaxis).

## 2023-02-15 NOTE — HISTORY OF PRESENT ILLNESS
[Asthma] : asthma  [Asthma well controlled?] : asthma well controlled: yes [] : The following medications are to be available during the challenge procedure: [Diphenhydramine] : Diphenhydramine, 1-2mg/kg IM (max dose 50mg), (50mg/1 cc) [___ mg] : Dose: [unfilled] mg [Epinephrine 1:1000 IM] : Epinephrine 1:1000 IM, 0.01cc/kg (max dose 0.5 cc) [___ cc] : Volume: [unfilled] cc [___] : HR: [unfilled]  [_______] : Time: [unfilled] [Clear] : Skin Findings: Clear [No] : Reaction: No [___] : HR: [unfilled]  [Antihistamine use in past 5 days] : No antihistamine use in past 5 days [Recent Illness] : no recent illness [Fever] : no fever [de-identified] : Patient well today. Has not taken any antihistamines this month. Denies fever,  congestion, runny nose, SOB. \par Patient brings acetaminophen-codeine #2 (300mg-15mg). Patient has tolerated acetaminophen without any reactions. last took acetaminophen last month without any issues.  [FreeTextEntry1] :  acetaminophen-codeine #2 [FreeTextEntry2] : 1 pill  [FreeTextEntry3] : well appearing, lungs clear, skin clear, no abdominal pain.  [FreeTextEntry4] : SpO2 99%. well appearing, lungs clear, skin clear, no abdominal pain.  [FreeTextEntry5] : SpO2 97%. well appearing, lungs clear, skin clear, no abdominal pain.

## 2023-02-19 ENCOUNTER — EMERGENCY (EMERGENCY)
Facility: HOSPITAL | Age: 38
LOS: 1 days | Discharge: HOME | End: 2023-02-19
Attending: EMERGENCY MEDICINE | Admitting: EMERGENCY MEDICINE
Payer: MEDICARE

## 2023-02-19 VITALS
HEIGHT: 68 IN | OXYGEN SATURATION: 96 % | DIASTOLIC BLOOD PRESSURE: 90 MMHG | TEMPERATURE: 98 F | SYSTOLIC BLOOD PRESSURE: 132 MMHG | HEART RATE: 108 BPM | RESPIRATION RATE: 20 BRPM

## 2023-02-19 LAB

## 2023-02-19 PROCEDURE — 36000 PLACE NEEDLE IN VEIN: CPT

## 2023-02-19 PROCEDURE — 99284 EMERGENCY DEPT VISIT MOD MDM: CPT | Mod: 25

## 2023-02-19 RX ORDER — SODIUM CHLORIDE 9 MG/ML
1000 INJECTION INTRAMUSCULAR; INTRAVENOUS; SUBCUTANEOUS ONCE
Refills: 0 | Status: COMPLETED | OUTPATIENT
Start: 2023-02-19 | End: 2023-02-19

## 2023-02-19 RX ORDER — CYCLOBENZAPRINE HYDROCHLORIDE 10 MG/1
5 TABLET, FILM COATED ORAL ONCE
Refills: 0 | Status: COMPLETED | OUTPATIENT
Start: 2023-02-19 | End: 2023-02-19

## 2023-02-19 RX ORDER — ACETAMINOPHEN 500 MG
975 TABLET ORAL ONCE
Refills: 0 | Status: COMPLETED | OUTPATIENT
Start: 2023-02-19 | End: 2023-02-19

## 2023-02-19 RX ADMIN — Medication 975 MILLIGRAM(S): at 21:30

## 2023-02-19 RX ADMIN — CYCLOBENZAPRINE HYDROCHLORIDE 5 MILLIGRAM(S): 10 TABLET, FILM COATED ORAL at 20:57

## 2023-02-19 RX ADMIN — Medication 975 MILLIGRAM(S): at 20:57

## 2023-02-19 NOTE — ED ADULT TRIAGE NOTE - CHIEF COMPLAINT QUOTE
cough ,body aches, headache ,sore throat ,diff breathing x 2 days. arrives nasal aron 2 liters- pulse ox 96.  lungs clear at present.. [facility]  fs 128 by ems cough ,body aches, headache ,sore throat ,diff breathing x 2 days. arrives nasal aron 2 liters- pulse ox 96.  lungs clear at present.. [facility]  [facility]  fs 128 by ems.  denies ch pains

## 2023-02-19 NOTE — ED PROVIDER NOTE - PHYSICAL EXAMINATION
GENERAL: Alert. No acute distress.   EYES: EOMI grossly normal. Anicteric.   HENT: Moist mucous membranes.  no pharyngeal erythema, bilateral TM pearly gray, no erythema  RESP: No conversation dyspnea, no resp distress, CTAB   CARDIOVASCULAR: RRR, no m/g/r  ABDOMEN: soft, non distended, nttp  MSK: ROM grossly normal in all 4 extremities. No deformities  SKIN: warm and dry  NEUROLOGIC: Alert and oriented x3  PSYCHIATRIC: Cooperative. Appropriate mood and affect

## 2023-02-19 NOTE — ED PROVIDER NOTE - NSFOLLOWUPINSTRUCTIONS_ED_ALL_ED_FT
LizznianCanapingan FrenchMontrose [facility]lishOur [facility] Lour[facility] Medical CenterssianSpanishTagalogTraditional ChineseVietnamese                                                                                                                          10 Things You Can Do to Manage Your COVID-19 Symptoms at Home          Get help right away if:  •You have trouble breathing.  •You have pain or pressure in your chest.  •You have confusion.  •You have bluish lips and fingernails.  •You have difficulty waking from sleep.  •You have symptoms that get worse.  These symptoms may be an emergency. Get help right away. Call 911.   • Do not wait to see if the symptoms will go away.    •  Do not drive yourself to the hospital.     If you have possible or confirmed COVID-19    1. Stay home except to get medical care.    2. Monitor your symptoms carefully. If your symptoms get worse, call your healthcare provider immediately.    3. Get rest and stay hydrated.     4.If you have a medical appointment, call the healthcare provider ahead of time and tell them that you have or may have COVID-19.    5.For medical emergencies, call 911 and notify the dispatch personnel that you have or may have COVID-19.    6. Cover your cough and sneezes with a tissue or use the inside of your elbow.    7. Wash your hands often with soap and water for at least 20 seconds or clean your hands with an alcohol-based hand  that contains at least 60% alcohol.    8.As much as possible, stay in a specific room and away from other people in your home. Also, you should use a separate bathroom, if available. If you need to be around other people in or outside of the home, wear a mask    9. Avoid sharing personal items with other people in your household, like dishes, towels, and bedding.    10. Clean all surfaces that are touched often, like counters, tabletops, and doorknobs. Use household cleaning sprays or wipes according to the label instructions.  cdc.gov/coronavirus    07/16/2021  This information is not intended to replace advice given to you by your health care provider. Make sure you discuss any questions you have with your health care provider.  Document Revised: 11/02/2022 Document Reviewed: 11/02/2022  Elsevier Patient Education © 2022 [employer] Get help right away if:  •You have trouble breathing.  •You have pain or pressure in your chest.  •You have confusion.  •You have bluish lips and fingernails.  •You have difficulty waking from sleep.  •You have symptoms that get worse.  These symptoms may be an emergency. Get help right away. Call 911.   • Do not wait to see if the symptoms will go away.    •  Do not drive yourself to the hospital.     10 Things You Can Do to Manage Your COVID-19 Symptoms at Home  1. Stay home except to get medical care.  2. Monitor your symptoms carefully. If your symptoms get worse, call your healthcare provider immediately.  3. Get rest and stay hydrated.   4.If you have a medical appointment, call the healthcare provider ahead of time and tell them that you have or may have COVID-19.  5.For medical emergencies, call 911 and notify the dispatch personnel that you have or may have COVID-19.  6. Cover your cough and sneezes with a tissue or use the inside of your elbow.  7. Wash your hands often with soap and water for at least 20 seconds or clean your hands with an alcohol-based hand  that contains at least 60% alcohol.  8. As much as possible, stay in a specific room and away from other people in your home. Also, you should use a separate bathroom, if available. If you need to be around other people in or outside of the home, wear a mask  9. Avoid sharing personal items with other people in your household, like dishes, towels, and bedding.  10. Clean all surfaces that are touched often, like counters, tabletops, and doorknobs. Use household cleaning sprays or wipes according to the label instructions.  cdc.gov/coronavirus    07/16/2021  This information is not intended to replace advice given to you by your health care provider. Make sure you discuss any questions you have with your health care provider.  Document Revised: 11/02/2022 Document Reviewed: 11/02/2022  [employer] Patient Education © 2022 [employer] You are here for multiple symptoms    You were diagnosed with COVID.     You can use 500-1000mg Tylenol every 6 hours for pain - as needed; maximal daily dose 3000mg.  This is an over-the-counter medications - please respect the warnings on the label. This medication come with certain risks and side effects that you need to discuss with your doctor, especially if you are taking it for a prolonged period.    You do not have PE today    If you have any severe increase in pain, fever, uncontrollable nausea or vomiting, or inability to tolerate eating and drinking you need to immediately return to the emergency room.    Get help right away if:  •You have trouble breathing.  •You have pain or pressure in your chest.  •You have confusion.  •You have bluish lips and fingernails.  •You have difficulty waking from sleep.  •You have symptoms that get worse.  These symptoms may be an emergency. Get help right away. Call 911.   • Do not wait to see if the symptoms will go away.    •  Do not drive yourself to the hospital.     10 Things You Can Do to Manage Your COVID-19 Symptoms at Home  1. Stay home except to get medical care.  2. Monitor your symptoms carefully. If your symptoms get worse, call your healthcare provider immediately.  3. Get rest and stay hydrated.   4.If you have a medical appointment, call the healthcare provider ahead of time and tell them that you have or may have COVID-19.  5.For medical emergencies, call 911 and notify the dispatch personnel that you have or may have COVID-19.  6. Cover your cough and sneezes with a tissue or use the inside of your elbow.  7. Wash your hands often with soap and water for at least 20 seconds or clean your hands with an alcohol-based hand  that contains at least 60% alcohol.  8. As much as possible, stay in a specific room and away from other people in your home. Also, you should use a separate bathroom, if available. If you need to be around other people in or outside of the home, wear a mask  9. Avoid sharing personal items with other people in your household, like dishes, towels, and bedding.  10. Clean all surfaces that are touched often, like counters, tabletops, and doorknobs. Use household cleaning sprays or wipes according to the label instructions.  cdc.gov/coronavirus    07/16/2021  This information is not intended to replace advice given to you by your health care provider. Make sure you discuss any questions you have with your health care provider.  Document Revised: 11/02/2022 Document Reviewed: 11/02/2022  [employer] Patient Education © 2022 [employer]

## 2023-02-19 NOTE — ED ADULT NURSE REASSESSMENT NOTE - NS ED NURSE REASSESS COMMENT FT1
unable to obtain IV access. ALEYDA Rios attempted as well. MD STILES aware. PT baseline mental status, NAD.

## 2023-02-19 NOTE — ED PROVIDER NOTE - PROGRESS NOTE DETAILS
Conner Larry) [facility] PGY-1: per pt, she states she did not have pe/dvt since she started eliquis.

## 2023-02-19 NOTE — ED PROVIDER NOTE - OBJECTIVE STATEMENT
37-year-old female here for 2-day of upper respiratory infection symptoms.  Reports body ache, cough, headache, shortness of breath, loss sore throat, right ear pain, rhinorrhea.,  Chill.Have a large gathering with 100s of people a week ago.    Patient had past medical history of factor V Leiden, on eliquis BID, no miss doses. Patient also reported 1 episode of hemoptysis and that she felt her right calf is increased in size. Had hx of breakthrough PE on eliquis. 37-year-old female here for 2-day of upper respiratory infection symptoms.  Reports body ache, cough, headache, shortness of breath, loss sore throat, right ear pain, rhinorrhea., Chill. Have a large gathering with 100s of people a week ago.    Patient had past medical history of factor V Leiden, on eliquis BID, no miss doses. Patient also reported 1 episode of hemoptysis and that she felt her right calf is increased in size. Had hx of breakthrough PE on eliquis.

## 2023-02-19 NOTE — ED ADULT NURSE NOTE - OBJECTIVE STATEMENT
Pt A&Ox4, ambulatory at baseline in NAD, resp equal and unlabored. pmhx: Factor V, PE. Pt from  without staff. Endorsing x2 days cough, pain upon deep inspiration, rhinorrhea, fever/chills, episode of hemoptysis, body aches, sore throat. Denies; wheezing, n/v/d, rash, CP, syncope, seizure like activity, palpitations, bleeding.

## 2023-02-19 NOTE — ED ADULT NURSE NOTE - EXTENSIONS OF SELF_ADULT
No new care gaps identified.  [facility] Embedded Care Gaps. Reference number: [id number]. 7/20/2022   1:27:54 AM LEOT  
Refill Decision Note   Margarita Cruz  is requesting a refill authorization.  Brief Assessment and Rationale for Refill:  Approve     Medication Therapy Plan:       Medication Reconciliation Completed: No   Comments:     No Care Gaps recommended.     Note composed:11:40 AM 07/20/2022            
None

## 2023-02-19 NOTE — ED PROVIDER NOTE - ATTENDING CONTRIBUTION TO CARE
Aria Bass MD attending physician.  This is a 37-year-old woman who has history of factor V Leiden deficiency with multiple histories of pulmonary embolism comes in with cough chest pain shortness of breath URI symptoms.  She has already failed medications previously for her pulmonary embolism.  Although this is likely a URI the plan is to CAT scan her to evaluate for embolism given her high risk status.  Patient is awake and alert and able to communicate well.  She understands the concerns.    I performed a history and physical exam of the patient and discussed their management with the resident and /or advanced care provider. I reviewed the resident and /or ACP's note and agree with the documented findings and plan of care. My medical decison making and observations are found above.

## 2023-02-19 NOTE — ED PROVIDER NOTE - PATIENT PORTAL LINK FT
You can access the FollowMyHealth Patient Portal offered by [facility] by registering at the following website: [email or web address]. By joining [employer]’s FollowMyHealth portal, you will also be able to view your health information using other applications (apps) compatible with our system.

## 2023-02-19 NOTE — ED ADULT NURSE NOTE - NS ED NURSE RECORD ANOTHER VITAL SIGN
SHE DROPPED HER SPIRONOLACTONE 25MG IN THE SINK LAST NIGHT AND THE SINK WAS WET SO THEY ARE ALL RUINED AND SHE NEEDS A REFILL.  SHE DID CALL THE PHARMACY AND WAS TOLD TO CALL HER TO EXPLAIN WHAT HAPPENED BEFORE THEY WOULD REFILL AND SHE IS AWARE THAT HER IN Yes, record another set of vital signs

## 2023-02-19 NOTE — ED PROVIDER NOTE - NS ED ROS FT
CONSTITUTIONAL: No fever +chill  HEENT: Denies changes in vision and hearing. +right ear pain, Rhinorrhea, sore throat  RESPIRATORY: + SOB and cough.  CV:  positive chest tightness  GI: Denies abdominal pain, nausea, vomiting and diarrhea.  : Denies dysuria and urinary frequency.  MSK: Denies myalgia and joint pain.  SKIN: Denies rash   NEUROLOGICAL:  positive headache

## 2023-02-19 NOTE — ED ADULT NURSE NOTE - CHIEF COMPLAINT QUOTE
cough ,body aches, headache ,sore throat ,diff breathing x 2 days. arrives nasal aron 2 liters- pulse ox 96.  lungs clear at present.. [facility]  fs 128 by ems.  denies ch pains

## 2023-02-19 NOTE — ED PROVIDER NOTE - CLINICAL SUMMARY MEDICAL DECISION MAKING FREE TEXT BOX
47-year-old female here for 2 days of URI symptoms.  Suspect viral URI.  Will do RVP, Tylenol, she also requested a dose of her evening Flexeril. This is given.   Patient has a past medical history of factor V Leiden, reported 1 episode of hemoptysis, currently also her chest tightness and subjective increase in size in right calf.  Patient is mildly tachycardic at triage.  While patient is on Eliquis twice a day and has no missed doses per record,  by Wells criteria, patient is moderate risk.  Because of this, we will pursue PE work-up. 47-year-old female here for 2 days of URI symptoms.  Suspect viral URI.  Will do RVP, Tylenol, she also requested a dose of her evening Flexeril. This is given.   Patient has a past medical history of factor V Leiden, reported 1 episode of hemoptysis, currently also her chest tightness and subjective increase in size in right calf.  Patient is mildly tachycardic at triage.  While patient is on Eliquis twice a day and has no missed doses per record,  by Wells criteria, patient is moderate risk.  Because of this, we will pursue PE work-up.    Aria Bass MD attending physician.  This is a 37-year-old woman who has history of factor V Leiden deficiency with multiple histories of pulmonary embolism comes in with cough chest pain shortness of breath URI symptoms.  She has already failed medications previously for her pulmonary embolism.  Although this is likely a URI the plan is to CAT scan her to evaluate for embolism given her high risk status.  Patient is awake and alert and able to communicate well.  She understands the concerns.

## 2023-02-20 ENCOUNTER — NON-APPOINTMENT (OUTPATIENT)
Age: 38
End: 2023-02-20

## 2023-02-20 VITALS
DIASTOLIC BLOOD PRESSURE: 71 MMHG | RESPIRATION RATE: 16 BRPM | TEMPERATURE: 98 F | HEART RATE: 68 BPM | SYSTOLIC BLOOD PRESSURE: 111 MMHG | OXYGEN SATURATION: 100 %

## 2023-02-20 LAB
ALBUMIN SERPL ELPH-MCNC: 5.7 G/DL — HIGH (ref 3.3–5)
ALP SERPL-CCNC: 102 U/L — SIGNIFICANT CHANGE UP (ref 40–120)
ALT FLD-CCNC: 38 U/L — HIGH (ref 4–33)
ANION GAP SERPL CALC-SCNC: 12 MMOL/L — SIGNIFICANT CHANGE UP (ref 7–14)
APTT BLD: 34.5 SEC — SIGNIFICANT CHANGE UP (ref 27–36.3)
AST SERPL-CCNC: 19 U/L — SIGNIFICANT CHANGE UP (ref 4–32)
BASOPHILS # BLD AUTO: 0.03 K/UL — SIGNIFICANT CHANGE UP (ref 0–0.2)
BASOPHILS NFR BLD AUTO: 0.4 % — SIGNIFICANT CHANGE UP (ref 0–2)
BILIRUB SERPL-MCNC: 0.3 MG/DL — SIGNIFICANT CHANGE UP (ref 0.2–1.2)
BUN SERPL-MCNC: 13 MG/DL — SIGNIFICANT CHANGE UP (ref 7–23)
CALCIUM SERPL-MCNC: 10.6 MG/DL — HIGH (ref 8.4–10.5)
CHLORIDE SERPL-SCNC: 99 MMOL/L — SIGNIFICANT CHANGE UP (ref 98–107)
CO2 SERPL-SCNC: 26 MMOL/L — SIGNIFICANT CHANGE UP (ref 22–31)
CREAT SERPL-MCNC: 0.8 MG/DL — SIGNIFICANT CHANGE UP (ref 0.5–1.3)
EGFR: 98 ML/MIN/1.73M2 — SIGNIFICANT CHANGE UP
EOSINOPHIL # BLD AUTO: 0.05 K/UL — SIGNIFICANT CHANGE UP (ref 0–0.5)
EOSINOPHIL NFR BLD AUTO: 0.7 % — SIGNIFICANT CHANGE UP (ref 0–6)
GLUCOSE SERPL-MCNC: 86 MG/DL — SIGNIFICANT CHANGE UP (ref 70–99)
HCG SERPL-ACNC: <5 MIU/ML — SIGNIFICANT CHANGE UP
HCT VFR BLD CALC: 42.5 % — SIGNIFICANT CHANGE UP (ref 34.5–45)
HGB BLD-MCNC: 13.2 G/DL — SIGNIFICANT CHANGE UP (ref 11.5–15.5)
IANC: 4.61 K/UL — SIGNIFICANT CHANGE UP (ref 1.8–7.4)
IMM GRANULOCYTES NFR BLD AUTO: 0.3 % — SIGNIFICANT CHANGE UP (ref 0–0.9)
INR BLD: 1.19 RATIO — HIGH (ref 0.88–1.16)
LYMPHOCYTES # BLD AUTO: 2.42 K/UL — SIGNIFICANT CHANGE UP (ref 1–3.3)
LYMPHOCYTES # BLD AUTO: 32.4 % — SIGNIFICANT CHANGE UP (ref 13–44)
MAGNESIUM SERPL-MCNC: 2.1 MG/DL — SIGNIFICANT CHANGE UP (ref 1.6–2.6)
MCHC RBC-ENTMCNC: 28.6 PG — SIGNIFICANT CHANGE UP (ref 27–34)
MCHC RBC-ENTMCNC: 31.1 GM/DL — LOW (ref 32–36)
MCV RBC AUTO: 92.2 FL — SIGNIFICANT CHANGE UP (ref 80–100)
MONOCYTES # BLD AUTO: 0.34 K/UL — SIGNIFICANT CHANGE UP (ref 0–0.9)
MONOCYTES NFR BLD AUTO: 4.6 % — SIGNIFICANT CHANGE UP (ref 2–14)
NEUTROPHILS # BLD AUTO: 4.61 K/UL — SIGNIFICANT CHANGE UP (ref 1.8–7.4)
NEUTROPHILS NFR BLD AUTO: 61.6 % — SIGNIFICANT CHANGE UP (ref 43–77)
NRBC # BLD: 0 /100 WBCS — SIGNIFICANT CHANGE UP (ref 0–0)
NRBC # FLD: 0 K/UL — SIGNIFICANT CHANGE UP (ref 0–0)
PHOSPHATE SERPL-MCNC: 3.8 MG/DL — SIGNIFICANT CHANGE UP (ref 2.5–4.5)
PLATELET # BLD AUTO: 245 K/UL — SIGNIFICANT CHANGE UP (ref 150–400)
POTASSIUM SERPL-MCNC: 4.1 MMOL/L — SIGNIFICANT CHANGE UP (ref 3.5–5.3)
POTASSIUM SERPL-SCNC: 4.1 MMOL/L — SIGNIFICANT CHANGE UP (ref 3.5–5.3)
PROT SERPL-MCNC: 9.6 G/DL — HIGH (ref 6–8.3)
PROTHROM AB SERPL-ACNC: 13.8 SEC — HIGH (ref 10.5–13.4)
RBC # BLD: 4.61 M/UL — SIGNIFICANT CHANGE UP (ref 3.8–5.2)
RBC # FLD: 13.6 % — SIGNIFICANT CHANGE UP (ref 10.3–14.5)
SODIUM SERPL-SCNC: 137 MMOL/L — SIGNIFICANT CHANGE UP (ref 135–145)
WBC # BLD: 7.47 K/UL — SIGNIFICANT CHANGE UP (ref 3.8–10.5)
WBC # FLD AUTO: 7.47 K/UL — SIGNIFICANT CHANGE UP (ref 3.8–10.5)

## 2023-02-20 PROCEDURE — 71275 CT ANGIOGRAPHY CHEST: CPT | Mod: 26,MA

## 2023-02-20 RX ORDER — ACETAMINOPHEN WITH CODEINE 300MG-30MG
1 TABLET ORAL ONCE
Refills: 0 | Status: DISCONTINUED | OUTPATIENT
Start: 2023-02-20 | End: 2023-02-20

## 2023-02-20 RX ADMIN — Medication 1 TABLET(S): at 02:01

## 2023-02-20 RX ADMIN — SODIUM CHLORIDE 1000 MILLILITER(S): 9 INJECTION INTRAMUSCULAR; INTRAVENOUS; SUBCUTANEOUS at 00:19

## 2023-02-20 RX ADMIN — Medication 100 MILLIGRAM(S): at 02:01

## 2023-02-20 NOTE — PROVIDER CONTACT NOTE (OTHER) - ASSESSMENT
Writer was informed pt will be medically  cleared for discharged.  Writer called pt's residence  spoke to Osbaldo informed them pt will be discharged.  She confirmed pt travels with ambulance supervision to [address] [place] [address].  Jaydonr also informed agency nurse Jd at [phone] pt will be discharged. he  contacted the niece at [phone]. She revealed the address that was on file. The  contacted [facility] , and they provided Trip number [phone]. ETA Hour . Non-emergent ambulance transport form completed . The  notifies the provider who agreed to the plan. No further SW intervention is required at this time. Writer was informed pt will be medically  cleared for discharged.  Writer called pt's residence  spoke to Osbaldo informed them pt will be discharged.  She confirmed pt travels with ambulance supervision to [address] [place] [address].  Writer also informed agency nurse Jd at [phone] pt will be discharged. She revealed the address that was on file. The  contacted [facility] , and they provided Trip number [phone]. ETA Hour . Non-emergent ambulance transport form completed . The  notifies the provider who agreed to the plan. No further SW intervention is required at this time.

## 2023-02-22 ENCOUNTER — EMERGENCY (EMERGENCY)
Facility: HOSPITAL | Age: 38
LOS: 0 days | Discharge: HOME | End: 2023-02-22
Attending: STUDENT IN AN ORGANIZED HEALTH CARE EDUCATION/TRAINING PROGRAM
Payer: MEDICARE

## 2023-02-22 VITALS
RESPIRATION RATE: 19 BRPM | HEIGHT: 68 IN | TEMPERATURE: 98 F | OXYGEN SATURATION: 100 % | HEART RATE: 111 BPM | WEIGHT: 279.99 LBS | DIASTOLIC BLOOD PRESSURE: 69 MMHG | SYSTOLIC BLOOD PRESSURE: 96 MMHG

## 2023-02-22 VITALS — SYSTOLIC BLOOD PRESSURE: 164 MMHG | HEART RATE: 112 BPM | OXYGEN SATURATION: 100 % | DIASTOLIC BLOOD PRESSURE: 85 MMHG

## 2023-02-22 DIAGNOSIS — Z91.018 ALLERGY TO OTHER FOODS: ICD-10-CM

## 2023-02-22 DIAGNOSIS — Z91.040 LATEX ALLERGY STATUS: ICD-10-CM

## 2023-02-22 DIAGNOSIS — R07.89 OTHER CHEST PAIN: ICD-10-CM

## 2023-02-22 DIAGNOSIS — R06.02 SHORTNESS OF BREATH: ICD-10-CM

## 2023-02-22 DIAGNOSIS — Z88.8 ALLERGY STATUS TO OTHER DRUGS, MEDICAMENTS AND BIOLOGICAL SUBSTANCES STATUS: ICD-10-CM

## 2023-02-22 DIAGNOSIS — Z88.0 ALLERGY STATUS TO PENICILLIN: ICD-10-CM

## 2023-02-22 DIAGNOSIS — U07.1 COVID-19: ICD-10-CM

## 2023-02-22 DIAGNOSIS — R00.0 TACHYCARDIA, UNSPECIFIED: ICD-10-CM

## 2023-02-22 DIAGNOSIS — Z88.6 ALLERGY STATUS TO ANALGESIC AGENT: ICD-10-CM

## 2023-02-22 DIAGNOSIS — J45.909 UNSPECIFIED ASTHMA, UNCOMPLICATED: ICD-10-CM

## 2023-02-22 DIAGNOSIS — Z79.01 LONG TERM (CURRENT) USE OF ANTICOAGULANTS: ICD-10-CM

## 2023-02-22 PROCEDURE — 93010 ELECTROCARDIOGRAM REPORT: CPT

## 2023-02-22 PROCEDURE — 99284 EMERGENCY DEPT VISIT MOD MDM: CPT

## 2023-02-22 RX ORDER — ALBUTEROL 90 UG/1
2 AEROSOL, METERED ORAL
Qty: 1 | Refills: 0
Start: 2023-02-22 | End: 2023-03-23

## 2023-02-22 RX ORDER — IPRATROPIUM/ALBUTEROL SULFATE 18-103MCG
3 AEROSOL WITH ADAPTER (GRAM) INHALATION ONCE
Refills: 0 | Status: COMPLETED | OUTPATIENT
Start: 2023-02-22 | End: 2023-02-22

## 2023-02-22 RX ORDER — ACETAMINOPHEN WITH CODEINE 300MG-30MG
1 TABLET ORAL ONCE
Refills: 0 | Status: DISCONTINUED | OUTPATIENT
Start: 2023-02-22 | End: 2023-02-22

## 2023-02-22 RX ORDER — TRAMADOL HYDROCHLORIDE 50 MG/1
25 TABLET ORAL ONCE
Refills: 0 | Status: DISCONTINUED | OUTPATIENT
Start: 2023-02-22 | End: 2023-02-22

## 2023-02-22 RX ORDER — CHLORPROMAZINE HCL 10 MG
50 TABLET ORAL ONCE
Refills: 0 | Status: COMPLETED | OUTPATIENT
Start: 2023-02-22 | End: 2023-02-22

## 2023-02-22 RX ORDER — ACETAMINOPHEN WITH CODEINE 300MG-30MG
1 TABLET ORAL
Qty: 8 | Refills: 0
Start: 2023-02-22 | End: 2023-02-23

## 2023-02-22 RX ADMIN — Medication 3 MILLILITER(S): at 18:36

## 2023-02-22 RX ADMIN — Medication 1 TABLET(S): at 18:49

## 2023-02-22 RX ADMIN — Medication 20 MILLIGRAM(S): at 18:36

## 2023-02-22 RX ADMIN — Medication 50 MILLIGRAM(S): at 18:36

## 2023-02-22 RX ADMIN — Medication 0.5 MILLIGRAM(S): at 18:49

## 2023-02-22 NOTE — ED ADULT TRIAGE NOTE - CHIEF COMPLAINT QUOTE
Patient from [facility] c/o chest pain and SOB since Sunday. Patient was seen at [facility] where she was tested positive for covid.  hx asthma, seizures

## 2023-02-22 NOTE — ED PROVIDER NOTE - CLINICAL SUMMARY MEDICAL DECISION MAKING FREE TEXT BOX
37-year-old female with history of asthma and positive COVID well-appearing no acute respiratory distress, no retractions no wheezing noted normal work of breathing, will give prednisone and DuoNeb for symptomatic relief, send prednisone prescription to pharmacy, no indication for further testing as patient had comprehensive testing 2 days ago including lab work and CT angio chest to rule out PE/pneumonia.  Symptoms are consistent with COVID, return precautions discussed saturating 100% on room air. 37-year-old female with history of asthma and positive COVID well-appearing no acute respiratory distress, no retractions no wheezing noted normal work of breathing, will give prednisone and DuoNeb for symptomatic relief, send prednisone prescription to pharmacy, no indication for further testing as patient had comprehensive testing 2 days ago including lab work and CT angio chest to rule out PE/pneumonia.  Symptoms are consistent with COVID, return precautions discussed saturating 100% on room air. EKG no ST/T wave changes

## 2023-02-22 NOTE — ED PROVIDER NOTE - OBJECTIVE STATEMENT
37-year-old female with history of asthma recently seen in the ED 3 days ago and negative CT angio chest to rule out PE tested positive for COVID presenting to the ED with similar complaints of reported chest discomfort and shortness of breath.  Patient denies any change in symptoms, denies any other complaints.  Denies any history of previous ICU stay or intubations for asthma.

## 2023-02-22 NOTE — ED PROVIDER NOTE - PATIENT PORTAL LINK FT
You can access the FollowMyHealth Patient Portal offered by [facility] by registering at the following website: [email or web address]. By joining [employer]’s FollowMyHealth portal, you will also be able to view your health information using other applications (apps) compatible with our system.

## 2023-02-22 NOTE — ED PROVIDER NOTE - PHYSICAL EXAMINATION
VITAL SIGNS: I have reviewed nursing notes and confirm.  CONSTITUTIONAL: well-appearing, non-toxic, NAD  SKIN: Warm dry, normal skin turgor  HEAD: NCAT  CARD: RRR, no murmurs, rubs or gallops  RESP: clear to ausculation b/l.  No rales, rhonchi, or wheezing.  PSYCH: Cooperative, appropriate.

## 2023-02-22 NOTE — ED ADULT NURSE NOTE - CHIEF COMPLAINT QUOTE
Patient from [facility] c/o chest pain and SOB since Sunday. Patient was seen at [facility] where she was tested positive for covid.  hx asthma, seizures

## 2023-02-22 NOTE — ED ADULT NURSE REASSESSMENT NOTE - NS ED NURSE REASSESS COMMENT FT1
pt report received from ALEYDA PETERSON. pt is stable and in no acute distress at this time. chart reviewed. pt safety maintained.

## 2023-02-23 ENCOUNTER — NON-APPOINTMENT (OUTPATIENT)
Age: 38
End: 2023-02-23

## 2023-03-02 NOTE — ED BEHAVIORAL HEALTH ASSESSMENT NOTE - FUND OF KNOWLEDGE
Airway patent, TM normal bilaterally, normal appearing mouth, nose, throat, neck supple with full range of motion, no cervical adenopathy. Normal

## 2023-03-15 NOTE — BH CONSULTATION LIAISON ASSESSMENT NOTE - DOMICILE TYPE
Physical Therapy Visit   [facility]  Outpatient Physical Therapy  [place] [address]  Phone: [phone] Fax: [phone]    Physician Name: Virginia Terrell MD               Patient Name: Christy Gordillo  MRN: [id number]  YOB: 2004  PT Date of Service: 03/15/23    Visit Type: Daily Treatment Note  Visit: 2  Referring Provider: Virginia Terrell MD  Medical Diagnosis (from order): Diagnosis Information    Diagnosis  338.19, 719.46 (ICD-9-CM) - M25.561, M25.562 (ICD-10-CM) - Acute pain of both knees       Chart reviewed at time of initial evaluation (relevant co-morbidities, allergies, tests and medications listed):   No current outpatient medications on file.  No current facility-administered medications for this encounter.  Past Medical History:  No date: No known problems      SUBJECTIVE                                                                                                               3/15/2023: Pt reports that her knees may be feeling a little bit better. Exercises went well.     3/6/2023 eval: Pt is a 18 year old female with bilateral knee pain. She states that her LEFT knee is hurting more than her RIGHT, and that she feels pain through the anterior aspect of BOTH knees. She notes that she hears or feels some popping with walking and going up and down stairs. She denies any previous injuries or antecedent pain. Is currently in weightlifting, using light weight. Feels sore after doing stairs. Denies any numbness.    Aggravates: Stairs, walking  Relieves: Ice,      OBJECTIVE                                                                                                                          Muscle Length Tests  - 90/90 Hamstring at knee:  - Left:  56° - Right: 64°                Treatment     Therapeutic Exercise  Stationary bike resistance 2 x 5 minutes    Bosu ball squat    Reviewed home program:  [email or web address]  URL: [email or web address]  Other

## 2023-03-20 ENCOUNTER — NON-APPOINTMENT (OUTPATIENT)
Age: 38
End: 2023-03-20

## 2023-03-29 RX ORDER — TRIAMCINOLONE ACETONIDE 55 UG/1
55 SPRAY, METERED NASAL
Qty: 1 | Refills: 2 | Status: ACTIVE | COMMUNITY
Start: 2022-12-16 | End: 1900-01-01

## 2023-03-29 NOTE — ED ADULT NURSE NOTE - SUICIDE SCREENING DEPRESSION
[facility] Medicine Daily Progress Note    Date of Service  3/29/2023    Chief Complaint  Sheyla Garcia is a 72 y.o. female admitted 3/23/2023 with change in mental status    Hospital Course  Patient is a 72 y.o. female w/ a PMHx of hypertension, hypothyroidism, asthma, normal pressure hydrocephalus w/  shunt followed by neurosurgery, uncontrolled schizophrenia,  recent admission from 03/13-03/22 for management of acute encephalopathy 2/2 UTI w/ abx completion on 3/18/23 who presented to the ED on 3/23/2023 with AMS.     History obtained from  as patient has nonsensical speech.   states she was admitted for altered mental status with UTI, she was evaluated by PT/OT and was referred for SNF, but was unable to take her due to her psychiatric condition.  When they came home yesterday, patient was refusing care.  She was resistant to move or take her medication, would spit them out.  She has been stuck on her chair for the past 30 hours.  Patient states she refused to move due to muscle weakness and slight rigidity.  She has been eating and  has been using a diaper. He also has a bedside commode for her.  Her  states she has been hearing voices telling her that her  is trying to kill her.  She has a long history of auditory hallucinations for the past 3 to 4 years, worse in the past 3 to 4 months.  She was diagnosed with schizophrenia and has been managed by a psychiatrist, Dr.Shaheen Spring. Unable to obtain records, however per PCP, on amitriptyline, abilify, lamictal, and propranolol. Has stopped Risperdal and Topamax. States she hasn't been taking her medications since discharge.     Patient does have a history of NPH s/p  shunt 1/13/22, followed by Dr. Glynn. She has been having persistent gait changes, urinary incontinence, and cognitive impairment. She does have a bladder sling years ago for urinary incontinence. She has recurrent falls and is fearful to walk. She has had  imaging for her knees with moderate osteoarthritic changes. Discussed with neurology patient had memory issues before  shunt placement, encephalopathy could be related to NPH related dementia versus primary progressive dementia.  Psychiatry recommended to increase 20 mg Abilify daily.      Interval Problem Update  3/28 Tachycardic overnight. MRI brain pending. Patient has been accepted for placement, pending MRI for medical clearance. Discussed with  at bedside, patient was seeing Dr. Glynn who had ordered an MRI to be done with anesthesia as the patient is unable to lit still as an outpatient. Nursing reached out to Dr. Jarvis office, ok for patient to get MRI with  shunt. Patient continues to have visual and auditory hallucinations.  reports these are not normal for her.     3/29 MRI pending. Potassium 3.5, oral replacement ordered. Patient sleeping, awakes to voice. Denies any complaints, minimal participation. Discussed case with palliative care.     I have discussed this patient's plan of care and discharge plan at IDT rounds today with Case Management, Nursing, Nursing leadership, and other members of the IDT team.    Consultants/Specialty  psychiatry    Code Status  Full Code    Disposition  Patient is not medically cleared for discharge.   Anticipate discharge to to home with close outpatient follow-up.  I have placed the appropriate orders for post-discharge needs.    Review of Systems  Review of Systems   Unable to perform ROS: Mental status change      Physical Exam  Temp:  [36.3 °C (97.4 °F)-36.7 °C (98 °F)] 36.3 °C (97.4 °F)  Pulse:  [] 102  Resp:  [18] 18  BP: (118-165)/() 143/84  SpO2:  [89 %-93 %] 93 %    Physical Exam  Vitals and nursing note reviewed.   Constitutional:       General: She is sleeping. She is not in acute distress.     Appearance: She is not ill-appearing.   Eyes:      Conjunctiva/sclera: Conjunctivae normal.   Cardiovascular:      Rate and Rhythm:  Regular rhythm. Tachycardia present.      Pulses: Normal pulses.   Pulmonary:      Effort: Pulmonary effort is normal. No respiratory distress.      Breath sounds: No wheezing.   Abdominal:      General: Abdomen is flat. There is no distension.      Tenderness: There is no abdominal tenderness. There is no guarding.   Musculoskeletal:         General: No swelling.      Cervical back: Normal range of motion.   Skin:     General: Skin is warm and dry.   Neurological:      Mental Status: She is easily aroused. She is disoriented.      Motor: Weakness present.   Psychiatric:         Attention and Perception: She perceives auditory and visual hallucinations.       Fluids    Intake/Output Summary (Last 24 hours) at 3/29/2023 1340  Last data filed at 3/28/2023 1800  Gross per 24 hour   Intake 480 ml   Output --   Net 480 ml       Laboratory  Recent Labs     03/27/23  0717 03/28/23  0323 03/29/23  0400   WBC 8.2 9.5 10.7   RBC 5.00 5.52* 5.42*   HEMOGLOBIN 15.3 17.1* 16.5*   HEMATOCRIT 47.4* 50.2* 50.1*   MCV 94.8 90.9 92.4   MCH 30.6 31.0 30.4   MCHC 32.3* 34.1 32.9*   RDW 44.4 42.7 43.2   PLATELETCT 261 263 281   MPV 11.0 10.9 10.6     Recent Labs     03/27/23  0717 03/28/23  0738 03/29/23  0400   SODIUM 145 145 145   POTASSIUM 3.7 3.6 3.5*   CHLORIDE 107 104 107   CO2 26 26 24   GLUCOSE 97 97 104*   BUN 9 10 12   CREATININE 0.33* 0.42* 0.36*   CALCIUM 9.4 9.7 9.5                   Imaging  MR-BRAIN-WITH & W/O    (Results Pending)        Assessment/Plan  * Acute encephalopathy- (present on admission)  Assessment & Plan  Patient alert awake, AO x1, follows simple commands  Still has hallucination    -Vitamin B12 pending, TSH WNL    I discussed with psych, recommended to increase ability to 20 mg daily, no recommendation for Cymbalta, was on risperdol and topamax    EEG showed non-specific encephalopathy, negative for epileptiform. Psych also recommended neurology consult. I talked to les, however Dr Kaiser, is not  convinced to see the patient    Previous physician spoke with neurology, Patient has memory issue before  shunt placement. Her acute encephalopathy could be related to NPH related dementia versus primary progressive dementia.  No further work-up indicated.     MRI brain pending, cleared with neurosurgery     Hypomagnesemia  Assessment & Plan  *Mg 1.6    -Replete as needed    Leukocytosis- (present on admission)  Assessment & Plan  *WBC 11.8 with left shift of 7.72  *likely 2/2 reactive leukocytosis    resolved    Polycythemia- (present on admission)  Assessment & Plan  *Hgb 16.4, MCV 93.6 (HgB 16.4 on 12/2022)  *2/2 sleep apnea    Insomnia  Assessment & Plan    -Continue home propranolol 10 mg every night    Tachycardia- (present on admission)  Assessment & Plan    *Unclear etiology, possible medication withdrawal/toxicity, malignant catatonia    -Hold SSRI/antipsychotics    Schizophrenia ([facility])  Assessment & Plan  *Schizoaffect vs Schizophrenia    -Hold home Abilify 15 mg daily for concerns for possible neurologic malignant syndrome  -Hold home Lamictal 100 mg  -Psychiatry evaluation for negative symptoms due to inability to care for herself    Normal pressure hydrocephalus (HCC)- (present on admission)  Assessment & Plan  *s/p  shunt on 1/13/22, followed by Dr. Glynn  *Still has difficulty with gait changes, urinary incontinence, and mental status changes    -Repeat MRI brain w/ and w/o    Fibromyalgia  Assessment & Plan    -Hold home methocarbamol 750 PRN  -Hold home Duloxetine 30 mg, concern for possible serotonin syndrome?  -Acetaminophen 1g q8h PRN    Hypokalemia- (present on admission)  Assessment & Plan  Replaced and monitor    Debility- (present on admission)  Assessment & Plan    -SNF placement,  unable to care for patient at home    Morbid obesity with BMI of 40.0-44.9, adult ([facility])- (present on admission)  Assessment & Plan    -Diet and exercise    KIRA (obstructive sleep apnea)- (present on  admission)  Assessment & Plan  *noncompliant     -CPAP ordered    Asthma  Assessment & Plan  *Controlled, exacerbated by pine pollen    -Hold albuterol    Essential hypertension- (present on admission)  Assessment & Plan  *BP: (143-173)/() 148/67    -Continue home amlodipine 10 mg daily  -Continue home lisinopril 10 mg daily         VTE prophylaxis: SCDs/TEDs    I have performed a physical exam and reviewed and updated ROS and Plan today (3/29/2023). In review of yesterday's note (3/28/2023), there are no changes except as documented above.         Positive

## 2023-04-07 NOTE — ED ADULT NURSE NOTE - NSFALLRSKINDICATORS_ED_ALL_ED
From: Suzette [place]  To: Donna Stevens  Sent: 4/7/2023 1:11 PM CDT  Subject: URGENT!!!     This message is being sent by Neida More on behalf of Suzette [place].    Does Suzetet lips, feet & hands look purple to you? He sounds very hoarse and breathing heavy. He’s been sick.  
Left message for mom to discuss Syair's symptoms.  Will send [facility] message as well  
no

## 2023-04-10 NOTE — DISCHARGE NOTE NURSING/CASE MANAGEMENT/SOCIAL WORK - HISTORY OF COVID-19 VACCINATION
--Start taking 2 pills of mirabegron (50mg daily) and let me know how you're doing on higher dose of mirabegron   --Monitor your blood pressure and let me know if it's elevated  --Let me know if you need a repeat bulking procedure again in the future  
Yes

## 2023-04-12 NOTE — ED PROVIDER NOTE - NS ED MD DISPO DISCHARGE CCDA
[FreeTextEntry1] : HISTORY OF PRESENTING ILLNESS:\par The patient is a 53 year old female here for a follow up on diabetes (telehealth appointment was performed at today's visit). Recent admission in 1/2023 to [facility] for abdominal pain s/p EGD, was told likely secondary to gastroparesis. Other medical history significant for mobile cecum following GI, neuropathy, fibromyalgia, depression, and anxiety. Previous hx of a alcohol abuse, now sober since May 2022. Reports that she will likely need surgery with GI for mobile cecum. Date to be decided. \par \par CHIEF COMPLAINT: T2DM \par \par DIABETES HPI:\par Type of Diabetes: T2DM \par Duration of Diabetes: > 20 years ago \par A1c: 1/19/2023 9.8%, 2/2023 8.5%, 3/9/2023 in office 8.5%\par Current home regimen:\par - Novolog to 10-14 units before meals, determines dose based on BG \par - Lantus 30 units\par - Metformin 1000mg BID --- started at last visit, occasional nausea, no constipation\par \par Past medications: Metformin and glipizide, previously metformin stopped 2/2 alcohol abuse  \par Compliance: reports compliancy with medications\par Insulin injection sites: abdomen and thigh region \par \par Diabetes complications:\par Microvascular: [+] neuropathy, [-] nephropathy, [-] retinopathy\par Has neuropathy on gabapentin \par Macrovascular: [-] CAD, [-] CVA, [-] PAD\par History of DKA/hospitalizations due to Diabetes: hospitalized for DKA 3 times in her lifetime, last admission was > 1 year ago \par \par Last retinopathy screening: UTD, denies retinopathy \par Last nephropathy screening (urine ACR): 3/2023 wnl\par Last foot exam/podiatry visit: does not see, does daily foot exams, denies active wounds or infections, reports neuropathy b/l feet and hands\par \par BG self monitoring: Checks fingersticks 3x times a day, using her mother's glucometer\par BG Trends: \par AM FS: 115, 175, 105, 90, 170\par Lunch: 150, 155, 110\par Dinner: 138, 155, 182, 109\par Bedtime: 95\par \par Hypoglycemia events: reports two episodes of hypoglycemia both occurring in the evenings after dinner, yesterday BG 65 at 9:21pm\par \par Diet: eating small meals 2/2 gastroparesis per patient, currently eating 2-3 meals a day, trying to eat more smaller meals more frequently per GI recommendations.\par \par 24 hr food recall:\par Breakfast: 1/2 muffin with coffee\par Lunch: broccoli and cheddar soup\par Dinner: salmon and vegetables \par Snacks: was snacking before, now no longer snacking as much, chocolate\par Drinks: cranberry/pomegranate juice diluted throughout the day, regular ginger ale \par \par Exercise: walks her dogs daily\par \par Steroid intake: not currently \par \par Family history of diabetes: T2DM in mother\par Social history: on disability \par \par Denies blurry vision, reports symptoms of polydypsia and polyuria\par Has gained about 20 pounds over the last one year per patient. \par \par Hx of pancreatitis 2/2 alcohol abuse. Previous hx of a alcohol abuse, now sober May 2022. Is in support groups and getting therapy. Has good emotional support from family. Hx of depression and anxiety, reports stable currently. \par \par Recent labs from 2/28/2023 hospitalization\par Cr 0.66\par \par Total Cholesterol 162\par Triglycerides 98\par HDL 60\par LDL 82\par non HDL cholesterol 101\par \par PCP: Dr. Hidalgo [phone]\par \par 
Patient/Caregiver provided printed discharge information.

## 2023-04-25 NOTE — OCCUPATIONAL THERAPY INITIAL EVALUATION ADULT - GENERAL OBSERVATIONS, REHAB EVAL
Pt encountered semisupine in bed, NAD, +heplock, alert and oriented, cooperative, pt c/o discomfort 2/2 menstruating at this time, vitals documented by PCA, no c/o shoulder discomfort, Valtrex Counseling: I discussed with the patient the risks of valacyclovir including but not limited to kidney damage, nausea, vomiting and severe allergy.  The patient understands that if the infection seems to be worsening or is not improving, they are to call.

## 2023-04-25 NOTE — ED PROVIDER NOTE - COVID-19 RESULT DATE/TIME
Wash two-three times daily with soap and water, blot dry, apply polysporin and a clean dressing. Watch for redness, swelling, pus, increasing pain, fever and return if any of those symptoms begin. Finish all of the antibiotics, take both as directed. Return to the ED in 2-3 days for packing removal and sooner if worse. Use over-the-counter ibuprofen and/or Tylenol as directed if needed for pain. Use SPF  while taking the antibiotics to avoid severe sunburn. 24-May-2022 18:47

## 2023-05-04 ENCOUNTER — APPOINTMENT (OUTPATIENT)
Dept: INTERNAL MEDICINE | Facility: CLINIC | Age: 38
End: 2023-05-04

## 2023-05-18 ENCOUNTER — APPOINTMENT (OUTPATIENT)
Dept: NEUROLOGY | Facility: CLINIC | Age: 38
End: 2023-05-18

## 2023-06-19 ENCOUNTER — NON-APPOINTMENT (OUTPATIENT)
Age: 38
End: 2023-06-19

## 2023-06-26 NOTE — ED PROVIDER NOTE - WET READ LAUNCH FT
There are no Wet Read(s) to document. Retention Suture Text: Retention sutures were placed to support the closure and prevent dehiscence.

## 2023-06-27 NOTE — DISCHARGE NOTE NURSING/CASE MANAGEMENT/SOCIAL WORK - NSDCPEPT PROEDMA_GEN_ALL_CORE
Patient examined, record reviewed, agree with findings and recommendations as documented above.   Yes

## 2023-07-05 ENCOUNTER — NON-APPOINTMENT (OUTPATIENT)
Age: 38
End: 2023-07-05

## 2023-07-09 ENCOUNTER — EMERGENCY (EMERGENCY)
Facility: HOSPITAL | Age: 38
LOS: 1 days | Discharge: HOME | End: 2023-07-09
Attending: EMERGENCY MEDICINE | Admitting: EMERGENCY MEDICINE
Payer: MEDICARE

## 2023-07-09 PROCEDURE — 99285 EMERGENCY DEPT VISIT HI MDM: CPT

## 2023-07-10 ENCOUNTER — NON-APPOINTMENT (OUTPATIENT)
Age: 38
End: 2023-07-10

## 2023-07-10 VITALS
RESPIRATION RATE: 16 BRPM | SYSTOLIC BLOOD PRESSURE: 104 MMHG | OXYGEN SATURATION: 100 % | TEMPERATURE: 98 F | HEART RATE: 77 BPM | DIASTOLIC BLOOD PRESSURE: 58 MMHG

## 2023-07-10 VITALS
TEMPERATURE: 98 F | RESPIRATION RATE: 16 BRPM | DIASTOLIC BLOOD PRESSURE: 82 MMHG | HEART RATE: 102 BPM | OXYGEN SATURATION: 97 % | SYSTOLIC BLOOD PRESSURE: 121 MMHG

## 2023-07-10 RX ORDER — HALOPERIDOL DECANOATE 100 MG/ML
5 INJECTION INTRAMUSCULAR ONCE
Refills: 0 | Status: COMPLETED | OUTPATIENT
Start: 2023-07-10 | End: 2023-07-10

## 2023-07-10 RX ORDER — METOCLOPRAMIDE HCL 10 MG
10 TABLET ORAL ONCE
Refills: 0 | Status: COMPLETED | OUTPATIENT
Start: 2023-07-10 | End: 2023-07-10

## 2023-07-10 RX ORDER — MIDAZOLAM HYDROCHLORIDE 1 MG/ML
5 INJECTION, SOLUTION INTRAMUSCULAR; INTRAVENOUS ONCE
Refills: 0 | Status: DISCONTINUED | OUTPATIENT
Start: 2023-07-10 | End: 2023-07-10

## 2023-07-10 RX ORDER — SODIUM CHLORIDE 9 MG/ML
1000 INJECTION INTRAMUSCULAR; INTRAVENOUS; SUBCUTANEOUS ONCE
Refills: 0 | Status: COMPLETED | OUTPATIENT
Start: 2023-07-10 | End: 2023-07-10

## 2023-07-10 RX ORDER — ACETAMINOPHEN 500 MG
1000 TABLET ORAL ONCE
Refills: 0 | Status: COMPLETED | OUTPATIENT
Start: 2023-07-10 | End: 2023-07-10

## 2023-07-10 RX ORDER — DIPHENHYDRAMINE HCL 50 MG
50 CAPSULE ORAL ONCE
Refills: 0 | Status: COMPLETED | OUTPATIENT
Start: 2023-07-10 | End: 2023-07-10

## 2023-07-10 RX ADMIN — Medication 10 MILLIGRAM(S): at 04:02

## 2023-07-10 RX ADMIN — Medication 2 MILLIGRAM(S): at 04:30

## 2023-07-10 RX ADMIN — Medication 50 MILLIGRAM(S): at 03:19

## 2023-07-10 RX ADMIN — Medication 400 MILLIGRAM(S): at 04:02

## 2023-07-10 RX ADMIN — MIDAZOLAM HYDROCHLORIDE 5 MILLIGRAM(S): 1 INJECTION, SOLUTION INTRAMUSCULAR; INTRAVENOUS at 04:35

## 2023-07-10 RX ADMIN — Medication 1000 MILLIGRAM(S): at 04:17

## 2023-07-10 RX ADMIN — SODIUM CHLORIDE 1000 MILLILITER(S): 9 INJECTION INTRAMUSCULAR; INTRAVENOUS; SUBCUTANEOUS at 04:02

## 2023-07-10 RX ADMIN — HALOPERIDOL DECANOATE 5 MILLIGRAM(S): 100 INJECTION INTRAMUSCULAR at 04:30

## 2023-07-10 RX ADMIN — MIDAZOLAM HYDROCHLORIDE 5 MILLIGRAM(S): 1 INJECTION, SOLUTION INTRAMUSCULAR; INTRAVENOUS at 04:00

## 2023-07-10 NOTE — ED PROVIDER NOTE - CONSTITUTIONAL MOOD
2/13/2018        To Whom It May Concern:   Attn: Wendy Perez  Fax #[phone]    Cadence Kumar is a patient under my care.  Cadence may return to work without restrictions effective immediately.     Please feel free to contact the office with any further questions.    Sincerely,        Evelia Riggins,         [place] [address]  [phone]                    
appropriate

## 2023-07-10 NOTE — ED ADULT NURSE NOTE - NSFALLUNIVINTERV_ED_ALL_ED
Bed/Stretcher in lowest position, wheels locked, appropriate side rails in place/Call bell, personal items and telephone in reach/Instruct patient to call for assistance before getting out of bed/chair/stretcher/Non-slip footwear applied when patient is off stretcher/[place] to call system/Physically safe environment - no spills, clutter or unnecessary equipment/Purposeful proactive rounding/Room/bathroom lighting operational, light cord in reach

## 2023-07-10 NOTE — ED ADULT NURSE REASSESSMENT NOTE - AS PAIN REST
0 (no pain/absence of nonverbal indicators of pain)
0 (no pain/absence of nonverbal indicators of pain)
no

## 2023-07-10 NOTE — ED PROVIDER NOTE - OBJECTIVE STATEMENT
38-year-old female with a history of factor V Leiden on Eliquis, pseudoseizures, chronic headaches, bipolar disorder, hypertension, diabetes, from group home with headache.  Patient reports 1 day of frontal headache associated with photophobia and nausea.  Patient states the symptoms are typical of her usual headaches.  She has not taken any medications prior to arrival.  No associated fever, chills, neck pain or stiffness, weakness, dizziness, numbness or tingling, vomiting.

## 2023-07-10 NOTE — PROVIDER CONTACT NOTE (OTHER) - ASSESSMENT
Writer was informed pt will be medically  cleared for discharged.  Writer called pt's residence  spoke to Sharyn  informed them pt will be discharged.  She confirmed pt travels with [employer]  to [address] [place] [address].   She revealed the address that was on file. The  contacted [facility] , and they provided Trip number [phone]. ETA Hour . The  notifies the provider who agreed to the plan. No further SW intervention is required at this time

## 2023-07-10 NOTE — ED ADULT TRIAGE NOTE - CHIEF COMPLAINT QUOTE
Pt seen at [facility] yesterday for chronic HA.  HA started yesterday.   Pt spoke to her neurologist and told to come to [facility] for evaluation.  No other symptoms. Hx of migraines.

## 2023-07-10 NOTE — ED PROVIDER NOTE - PATIENT PORTAL LINK FT
You can access the FollowMyHealth Patient Portal offered by [facility] by registering at the following website: [email or web address]. By joining [employer]’s FollowMyHealth portal, you will also be able to view your health information using other applications (apps) compatible with our system.

## 2023-07-10 NOTE — ED PROVIDER NOTE - CLINICAL SUMMARY MEDICAL DECISION MAKING FREE TEXT BOX
39 y/o F with h/o HTN, DM, bipolar disorder, factor V leiden, pseudoseizures, headaches from group home with TOMLINSON.  TOMLINSON is typical of her chronic.  No red flag s/s to suggest SAH, meningitis, mass.  No trauma.  No indication for emergent imaging at this time.  Will treat supportively, reassess for symptomatic improvement.

## 2023-07-10 NOTE — ED ADULT NURSE REASSESSMENT NOTE - NS ED NURSE REASSESS COMMENT FT1
At approximately 0320, pt became agitated and attempted to elope, ED staff stopped pt and re-directed her back to rm 15. Pt stating "I want to leave, you guys aren't doing anything to help me." Author provided therapeutic communication to the patient, describing the plan of care and treatment we plan to provide. Pt is now refusing care, and attempting to leave the ED again. MD Carmona and MD Hart made aware. Pt left room and attempted to leave out through the ambulance bay doors. Security and ANM made aware. Ed staff provided additional therapeutic communication, pt agreed to return to her room. Social work made aware, ride coordinated back to group home. While in the room, pt started banging her head against the wall and yelling at ED staff. MD Hart made aware, pt medicated as per MD orders. Pt continues to display aggressive behavior of banging her head and threatening to attack staff. Pt medicated as again as per MD orders. PCA at bedside to maintain safe environment. Airway is patent, speaking in clear and coherent sentences. Respirations are even and unlabored, no signs of respiratory distress. Pt refusing vital signs at this time.
Pt a&ox4, ambulatory, presents less agitated and aggressive at this time. EDT at bedside to maintain safe environment. Airway is patent, speaking in clear and coherent sentences. Respirations are even and unlabored, no signs of respiratory distress.
Pt resting in bed. Airway is patent, speaking in clear and coherent sentences. Respirations are even and unlabored, no signs of respiratory distress. EMT [place] bus #[id number] at bedside for transportation back to facility.

## 2023-07-10 NOTE — ED PROVIDER NOTE - NSFOLLOWUPINSTRUCTIONS_ED_ALL_ED_FT
Take your medications as prescribed.  Drink plenty of fluids.  You can take ibuprofen 600mg every 6 hours or Tylenol 650mg every 4 hours as needed for pain or fever.  Follow-up with your PMD in 24-48 hours as needed.  Return to the emergency department for any new or worsening symptoms.

## 2023-07-10 NOTE — ED ADULT NURSE NOTE - OBJECTIVE STATEMENT
37 y/o female, a&ox4, ambulatory, arrives from group home for headache. Pt endorses chronic headache for the past few days, denying visual changes. Steady gait. Denies CP, SOB, dyspnea, or pain at this time. 20GUSIV placed to right ac, labs collected and sent off. Pt medicated as per MD orders. Respirations are even and unlabored, no signs of respiratory distress.

## 2023-07-10 NOTE — ED PROVIDER NOTE - PROGRESS NOTE DETAILS
Tanner PINA: Pt initially refusing IV, attempted to elope from ED.  Security called to escort pt back to room.  Pt states she wants ambien, percocet.  Explained to this pt that these medications are not indicated at the time.  Pt now agreeable to IV and meds, but then becoming increasingly agitated banging her head against the wall.  Security at bedside, will give IM meds. Jose G Carmona MD: Patient now sleeping comfortably in bed. 1:1 discontinued. Patient pending transport.

## 2023-07-10 NOTE — ED ADULT NURSE NOTE - CHIEF COMPLAINT QUOTE
Pt seen at [facility] yesterday for chronic HA.  HA started yesterday.   Pt spoke to her neurologist and told to come to [facility] for evaluation.  No other symptoms. Hx of migraines.

## 2023-07-11 ENCOUNTER — EMERGENCY (EMERGENCY)
Facility: HOSPITAL | Age: 38
LOS: 1 days | Discharge: HOME | End: 2023-07-11
Admitting: STUDENT IN AN ORGANIZED HEALTH CARE EDUCATION/TRAINING PROGRAM
Payer: MEDICARE

## 2023-07-11 ENCOUNTER — NON-APPOINTMENT (OUTPATIENT)
Age: 38
End: 2023-07-11

## 2023-07-11 VITALS
TEMPERATURE: 98 F | SYSTOLIC BLOOD PRESSURE: 105 MMHG | OXYGEN SATURATION: 100 % | HEART RATE: 100 BPM | RESPIRATION RATE: 16 BRPM | DIASTOLIC BLOOD PRESSURE: 83 MMHG

## 2023-07-11 VITALS
TEMPERATURE: 98 F | DIASTOLIC BLOOD PRESSURE: 90 MMHG | SYSTOLIC BLOOD PRESSURE: 147 MMHG | OXYGEN SATURATION: 99 % | HEART RATE: 105 BPM | RESPIRATION RATE: 18 BRPM

## 2023-07-11 PROCEDURE — 99284 EMERGENCY DEPT VISIT MOD MDM: CPT

## 2023-07-11 RX ORDER — CHLORPROMAZINE HCL 10 MG
50 TABLET ORAL ONCE
Refills: 0 | Status: COMPLETED | OUTPATIENT
Start: 2023-07-11 | End: 2023-07-11

## 2023-07-11 RX ORDER — HALOPERIDOL DECANOATE 100 MG/ML
5 INJECTION INTRAMUSCULAR ONCE
Refills: 0 | Status: COMPLETED | OUTPATIENT
Start: 2023-07-11 | End: 2023-07-11

## 2023-07-11 RX ORDER — METHOCARBAMOL 500 MG/1
1000 TABLET, FILM COATED ORAL ONCE
Refills: 0 | Status: COMPLETED | OUTPATIENT
Start: 2023-07-11 | End: 2023-07-11

## 2023-07-11 RX ORDER — DIPHENHYDRAMINE HCL 50 MG
50 CAPSULE ORAL ONCE
Refills: 0 | Status: COMPLETED | OUTPATIENT
Start: 2023-07-11 | End: 2023-07-11

## 2023-07-11 RX ORDER — HYDROXYZINE HCL 10 MG
25 TABLET ORAL ONCE
Refills: 0 | Status: COMPLETED | OUTPATIENT
Start: 2023-07-11 | End: 2023-07-11

## 2023-07-11 RX ADMIN — Medication 50 MILLIGRAM(S): at 16:30

## 2023-07-11 RX ADMIN — METHOCARBAMOL 1000 MILLIGRAM(S): 500 TABLET, FILM COATED ORAL at 17:58

## 2023-07-11 RX ADMIN — Medication 25 MILLIGRAM(S): at 19:47

## 2023-07-11 RX ADMIN — HALOPERIDOL DECANOATE 5 MILLIGRAM(S): 100 INJECTION INTRAMUSCULAR at 17:32

## 2023-07-11 RX ADMIN — Medication 50 MILLIGRAM(S): at 18:30

## 2023-07-11 RX ADMIN — Medication 2 MILLIGRAM(S): at 16:30

## 2023-07-11 RX ADMIN — HALOPERIDOL DECANOATE 5 MILLIGRAM(S): 100 INJECTION INTRAMUSCULAR at 16:30

## 2023-07-11 NOTE — ED ADULT NURSE NOTE - CCCP TRG CHIEF CMPLNT
Instructions: This plan will send the code FBSE to the PM system.  DO NOT or CHANGE the price. Price (Do Not Change): 0.00 Detail Level: Simple psychiatric evaluation

## 2023-07-11 NOTE — ED PROVIDER NOTE - CLINICAL SUMMARY MEDICAL DECISION MAKING FREE TEXT BOX
This is a 38-year-old female past medical history asthma, diabetes, endometriosis,  factor V Leiden,   psychiatric history of bipolar disorder, schizoaffective disorder, with complaint of increased anxiety,  ideation to harm herself. Patient arrived with staff member from her group home [place] [phone].  Patient reports she does not feel c/o intermittent  auditory hallucination the voices are telling her to hurt herself. She does not act on it.  She feels sad about her family not being with her. She denies any previous suicidal or homicidal ideations visual hallucinations, or self injuries behaviour. This is a 38-year-old female past medical history asthma, diabetes, endometriosis,  factor V Leiden,   psychiatric history of bipolar disorder, schizoaffective disorder, with complaint of increased anxiety,  ideation to harm herself. Patient arrived with staff member from her group home [facility]  [phone].  Patient reports she does not feel c/o intermittent  auditory hallucination the voices are telling her to hurt herself. She does not act on it.  She feels sad about her family not being with her. She denies any previous suicidal or homicidal ideations visual hallucinations, or self injuries behaviour.  collateral info by provider from  [facility] ( [phone], who reports pt was complaining of ah  and anxiety, and expressed she want to be seen at the hospital, because she believes the medication she receives at the group home not strong enough for her. Pt does not hx of self injuries behaviour, or to hurt others.

## 2023-07-11 NOTE — ED ADULT NURSE REASSESSMENT NOTE - NS ED NURSE REASSESS COMMENT FT1
Pt placed on 4 pt restraints, pt increasingly becoming agitated and verbally aggressive towards staff. Pt threatened to throw herself on the floor. Staff unable to verbally de-escalate. Safety measures maintained, pt is on 1:1 for safety

## 2023-07-11 NOTE — ED BEHAVIORAL HEALTH NOTE - BEHAVIORAL HEALTH NOTE
Per SE Carpenter, pt is for discharge.  RAVINDRA called pt's group home and spoke with ALEYDA Pineda [phone].  ALEYDA Pineda informed pt is discharge and is able to take RN medication this evening.  Per ALEYDA Pineda, group home aide left hospital due to emergency and is requesting Ambulance for pt to return back to group [place] ([facility] located at [address] [place] [address]. Verbal huddle regarding coordination of care completed with JESUS Carpenter. Per SE Carpenter, pt is for discharge.  SW called pt's group home and spoke with ALEYDA Pineda [phone].  ALEYDA Pineda informed pt is discharge and is able to take RN medication this evening.  Per ALEYDA Pineda, group home aide left hospital due to emergency and is requesting Ambulance for pt to return back to group [place] ([facility]), located at [address] [place] [address]. Verbal huddle regarding coordination of care completed with SE Carpenter.

## 2023-07-11 NOTE — ED PROVIDER NOTE - PROGRESS NOTE DETAILS
SE Carpenter- pt escalated, banging her head into the wall, and acting out towards staff member, was unable to de escalate, restraints ordered at 1815 and was discontinued at 1918. Pt after with good behavioural control. NP Pauline- pt expresses feels better, denies ah or ideation to hurt herself, expressing wants to return to her group home, no behavioural issues at this time.

## 2023-07-11 NOTE — ED ADULT NURSE NOTE - SUICIDE SCREENING QUESTION 1
Please print prescan out of Media [employer] and then complete and sign.   Once form is completed and signed, please fax to [phone] or [phone].    Please direct any questions to [phone] Option 3.   Thanks,  Forms Completion Team.  Yes

## 2023-07-11 NOTE — ED ADULT TRIAGE NOTE - CHIEF COMPLAINT QUOTE
From group home with staff, hx of bipolar and schizophrenia, reports auditory hallucinations and SI, denies plan, denies visual hallucinations, denies HI

## 2023-07-11 NOTE — ED PROVIDER NOTE - PATIENT PORTAL LINK FT
You can access the FollowMyHealth Patient Portal offered by [facility] by registering at the following website: [email or web address]. By joining [employer]’s FollowMyHealth portal, you will also be able to view your health information using other applications (apps) compatible with our system.

## 2023-07-11 NOTE — ED PROVIDER NOTE - NSFOLLOWUPINSTRUCTIONS_ED_ALL_ED_FT
Follow up with your primary care physician and psychiatrist in 48-72 hours.  You may also see the psychiatrist at [facility]:    [address] [place] [address]  Phone: [phone]    [facility] on [facility] Information:    -Walk-in hours: Monday to Friday, 9am to 3pm   -Almost all walk-in patients will be able to see a psych prescriber the same day   -Scheduled, non-urgent, evening remote/virtual appointments are available on a limited basis. Call our  to inquire about these: [phone]. A crisis center clinician screens these requests in the late afternoon and if appropriate it takes a few days to set-up.   -Visits take about 2 to 4 hours total   -Mornings are the best time for patients to arrive    For Telehealth options try:  [employer]: [email or web address] (to access psychiatrist or therapist)  [employer]: [email or web address] (to access psychiatrist or therapist)  Better Help: [email or web address] (Largest online therapy group)      SEEK IMMEDIATE MEDICAL CARE IF YOU HAVE ANY OF THE FOLLOWING SYMPTOMS: thoughts about hurting or killing yourself, thoughts about hurting or killing somebody else, hallucinations or any other worsening or persistent symptoms OR ANY NEW OR CONCERNING SYMPTOMS.

## 2023-07-11 NOTE — ED ADULT NURSE REASSESSMENT NOTE - NS ED NURSE REASSESS COMMENT FT1
Break RN Note- Patient complaining the voices in her head aren't getting any better. SE Duran made aware. Patient to be medicated as ordered.

## 2023-07-11 NOTE — ED PROVIDER NOTE - OBJECTIVE STATEMENT
This is a 38-year-old female past medical history asthma, diabetes, endeometrosis,  factor V Leiden,   psychiatric history of bipolar disorder, schizoaffective disorder, with complaint of increased anxiety ideation to harm herself patient arrived with staff member from her group home staff member her name is Pat drug [phone] patient reports she does not feel too well sometimes she has auditory hallucination the voices are telling her to hurt herself she does not act on it she feels sad about her family not being with her she denies any previous suicidal or homicidal ideations visual hallucinations This is a 38-year-old female past medical history asthma, diabetes, endeometrosis,  factor V Leiden,   psychiatric history of bipolar disorder, schizoaffective disorder, with complaint of increased anxiety,  ideation to harm herself. Patient arrived with staff member from her group home [place] [phone].  Patient reports she does not feel c/o intermittent  auditory hallucination the voices are telling her to hurt herself. She does not act on it.  She feels sad about her family not being with her. She denies any previous suicidal or homicidal ideations visual hallucinations, or self injuries behaviour This is a 38-year-old female past medical history asthma, diabetes, endometriosis,  factor V Leiden,   psychiatric history of bipolar disorder, schizoaffective disorder, with complaint of increased anxiety,  ideation to harm herself. Patient arrived with staff member from her group home [place] [phone].  Patient reports she does not feel c/o intermittent  auditory hallucination the voices are telling her to hurt herself. She does not act on it.  She feels sad about her family not being with her. She denies any previous suicidal or homicidal ideations visual hallucinations, or self injuries behaviour.

## 2023-07-11 NOTE — ED ADULT NURSE NOTE - NSFALLUNIVINTERV_ED_ALL_ED
Bed/Stretcher in lowest position, wheels locked, appropriate side rails in place/Call bell, personal items and telephone in reach/Instruct patient to call for assistance before getting out of bed/chair/stretcher/Non-slip footwear applied when patient is off stretcher/[place] to call system/Physically safe environment - no spills, clutter or unnecessary equipment/Purposeful proactive rounding/Room/bathroom lighting operational, light cord in reach

## 2023-07-11 NOTE — ED ADULT NURSE NOTE - OBJECTIVE STATEMENT
Pt presents to , alert&orientedx4, ambulatory, coming group home (accompanied by  hx of Schizophrenia and Bipolar coming to ED for evaluation of commanding auditory hallucinations and SI. Pt states the voices are telling her to harm herself. Pt shes she bangs her head against the wall as self-harm, last time was yesterday. Pt medicated on arrival, pt started to bang her head against the wall. No visible injuries. Safety measures maintained. Pt close to nursing station. Awaiting further plan of care

## 2023-07-11 NOTE — ED ADULT NURSE REASSESSMENT NOTE - NS ED NURSE REASSESS COMMENT FT1
Restraints released at 19:15, pt is calm and cooperative. No visible injuries and +ROM. Safety measures maintained. Awaiting further plan of care

## 2023-07-11 NOTE — ED ADULT NURSE NOTE - NS_BH TRG Q4B_ED_A_ED
Scheduled procedure with Valery Patient Mom,  at Other: Dr Werner  & Nursing staff   Scheduled Via: Case Creation for [facility] Case # [id number]  Procedure date: 5/10/21  Procedure time: TBD  Rep Contacted: n/a  Entered into MD's [place] n/a  Insurance confirmed as [facility] , will be the same at time of procedure: Yes Per   Pre-Op testing required Yes, Patient informed Yes  Prep required n/a, as advised by nursing with Dr Werner pre instructions given at office visit  CARY: System generated   No

## 2023-07-12 ENCOUNTER — EMERGENCY (EMERGENCY)
Facility: HOSPITAL | Age: 38
LOS: 1 days | Discharge: HOME | End: 2023-07-12
Admitting: EMERGENCY MEDICINE
Payer: MEDICARE

## 2023-07-12 ENCOUNTER — NON-APPOINTMENT (OUTPATIENT)
Age: 38
End: 2023-07-12

## 2023-07-12 VITALS
RESPIRATION RATE: 18 BRPM | TEMPERATURE: 98 F | DIASTOLIC BLOOD PRESSURE: 85 MMHG | HEART RATE: 112 BPM | OXYGEN SATURATION: 100 % | SYSTOLIC BLOOD PRESSURE: 149 MMHG

## 2023-07-12 VITALS
HEART RATE: 92 BPM | OXYGEN SATURATION: 100 % | DIASTOLIC BLOOD PRESSURE: 63 MMHG | SYSTOLIC BLOOD PRESSURE: 101 MMHG | TEMPERATURE: 99 F | RESPIRATION RATE: 16 BRPM

## 2023-07-12 PROCEDURE — 99284 EMERGENCY DEPT VISIT MOD MDM: CPT

## 2023-07-12 RX ORDER — DIPHENHYDRAMINE HCL 50 MG
50 CAPSULE ORAL ONCE
Refills: 0 | Status: COMPLETED | OUTPATIENT
Start: 2023-07-12 | End: 2023-07-12

## 2023-07-12 RX ORDER — CHLORPROMAZINE HCL 10 MG
100 TABLET ORAL ONCE
Refills: 0 | Status: COMPLETED | OUTPATIENT
Start: 2023-07-12 | End: 2023-07-12

## 2023-07-12 RX ORDER — QUETIAPINE FUMARATE 200 MG/1
200 TABLET, FILM COATED ORAL ONCE
Refills: 0 | Status: COMPLETED | OUTPATIENT
Start: 2023-07-12 | End: 2023-07-12

## 2023-07-12 RX ORDER — APIXABAN 2.5 MG/1
2.5 TABLET, FILM COATED ORAL ONCE
Refills: 0 | Status: COMPLETED | OUTPATIENT
Start: 2023-07-12 | End: 2023-07-12

## 2023-07-12 RX ORDER — HALOPERIDOL DECANOATE 100 MG/ML
5 INJECTION INTRAMUSCULAR ONCE
Refills: 0 | Status: COMPLETED | OUTPATIENT
Start: 2023-07-12 | End: 2023-07-12

## 2023-07-12 RX ORDER — RISPERIDONE 4 MG/1
3 TABLET ORAL ONCE
Refills: 0 | Status: COMPLETED | OUTPATIENT
Start: 2023-07-12 | End: 2023-07-12

## 2023-07-12 RX ORDER — ATORVASTATIN CALCIUM 80 MG/1
80 TABLET, FILM COATED ORAL AT BEDTIME
Refills: 0 | Status: DISCONTINUED | OUTPATIENT
Start: 2023-07-12 | End: 2023-07-16

## 2023-07-12 RX ORDER — DIVALPROEX SODIUM 500 MG/1
750 TABLET, DELAYED RELEASE ORAL ONCE
Refills: 0 | Status: COMPLETED | OUTPATIENT
Start: 2023-07-12 | End: 2023-07-12

## 2023-07-12 RX ORDER — METHOCARBAMOL 500 MG/1
500 TABLET, FILM COATED ORAL ONCE
Refills: 0 | Status: COMPLETED | OUTPATIENT
Start: 2023-07-12 | End: 2023-07-12

## 2023-07-12 RX ORDER — ACETAMINOPHEN 500 MG
975 TABLET ORAL ONCE
Refills: 0 | Status: COMPLETED | OUTPATIENT
Start: 2023-07-12 | End: 2023-07-12

## 2023-07-12 RX ORDER — HYDROXYZINE HCL 10 MG
25 TABLET ORAL ONCE
Refills: 0 | Status: COMPLETED | OUTPATIENT
Start: 2023-07-12 | End: 2023-07-12

## 2023-07-12 RX ADMIN — Medication 100 MILLIGRAM(S): at 18:25

## 2023-07-12 RX ADMIN — DIVALPROEX SODIUM 750 MILLIGRAM(S): 500 TABLET, DELAYED RELEASE ORAL at 21:02

## 2023-07-12 RX ADMIN — HALOPERIDOL DECANOATE 5 MILLIGRAM(S): 100 INJECTION INTRAMUSCULAR at 17:42

## 2023-07-12 RX ADMIN — APIXABAN 2.5 MILLIGRAM(S): 2.5 TABLET, FILM COATED ORAL at 21:30

## 2023-07-12 RX ADMIN — Medication 25 MILLIGRAM(S): at 21:29

## 2023-07-12 RX ADMIN — ATORVASTATIN CALCIUM 80 MILLIGRAM(S): 80 TABLET, FILM COATED ORAL at 21:02

## 2023-07-12 RX ADMIN — QUETIAPINE FUMARATE 200 MILLIGRAM(S): 200 TABLET, FILM COATED ORAL at 21:02

## 2023-07-12 RX ADMIN — Medication 0.1 MILLIGRAM(S): at 21:02

## 2023-07-12 RX ADMIN — RISPERIDONE 3 MILLIGRAM(S): 4 TABLET ORAL at 21:02

## 2023-07-12 RX ADMIN — Medication 975 MILLIGRAM(S): at 17:42

## 2023-07-12 RX ADMIN — Medication 2 MILLIGRAM(S): at 18:25

## 2023-07-12 RX ADMIN — METHOCARBAMOL 500 MILLIGRAM(S): 500 TABLET, FILM COATED ORAL at 18:03

## 2023-07-12 RX ADMIN — Medication 50 MILLIGRAM(S): at 18:25

## 2023-07-12 NOTE — ED BEHAVIORAL HEALTH NOTE - BEHAVIORAL HEALTH NOTE
Per SE Carpenter, pt is for discharge.  RAVINDRA called pt's group home and spoke with staff member Sharyn PORTILLO#[phone]. Sharyn informed pt is for discharge and was already given her nighttime medication.  Sharyn confirmed pt's mode of transportation is Ambulance for pt to return back to group home ([facility]), located at [address] [place] [address]. RN setting up EMS via [facility]. Verbal huddle regarding coordination of care completed with SE Carpenter.

## 2023-07-12 NOTE — ED PROVIDER NOTE - PROGRESS NOTE DETAILS
SE Carpenter- pt required prn med due to behavioural issues, banging her head ( no trauma, no s/s bruising, lumps, bumps) required restraint for safety reasons at 1830, discontinued at 1929.   She received all standing night time medications during stay.
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
## 2023-07-12 NOTE — ED ADULT TRIAGE NOTE - CHIEF COMPLAINT QUOTE
Pt reporting to the ED from group home for S/I. As per group home pt tremaine a picture of herself dead. Reports increase depression because she can not go outside. PMh of bipolar and schizophrenia, taking medication.

## 2023-07-12 NOTE — ED ADULT NURSE NOTE - NS PRO PASSIVE SMOKE EXP
Unknown Calcipotriene Counseling:  I discussed with the patient the risks of calcipotriene including but not limited to erythema, scaling, itching, and irritation.

## 2023-07-12 NOTE — ED PROVIDER NOTE - PATIENT PORTAL LINK FT
You can access the FollowMyHealth Patient Portal offered by [facility] by registering at the following website: [email or web address]. By joining [employer]’s FollowMyHealth portal, you will also be able to view your health information using other applications (apps) compatible with our system.

## 2023-07-12 NOTE — ED ADULT NURSE NOTE - OBJECTIVE STATEMENT
BREAK RN: pt. received to  from ambulance bay triage BIB EMS from group home p/w suicidal ideation. As per EMS, pt. was found with a drawing of herself by group home staff with a picture of herself dead. pt. denies an SI plan at this time. pt. maintains eye contact upon interview. pt. calm, cooperative and rediretable. Denies HI, AH/VH, ETOH/Drug use. pt. endorses compliance with medications. Pt. belongings secured. pt. wanded for safety. pt. independently changed into  Clothing. eval on going at this time.

## 2023-07-12 NOTE — ED PROVIDER NOTE - OBJECTIVE STATEMENT
This is a 38-year-old female past medical history of seizures, asthma, htn, gerd,  factor v leiden, psychiatric history schizoaffective bipolar disorder, insomnia with complaint of increased anxiety, back pain and intrusive thoughts. Reports she has lots of back pain took Robaxin  500 mg but feels that is not a strong medication for her, and needs higher doses. At times she feels she would be better if she would be dead.  She says most likely if she would not have these type of thoughts is she would not have so much of back pain.

## 2023-07-12 NOTE — ED ADULT NURSE REASSESSMENT NOTE - NS ED NURSE REASSESS COMMENT FT1
Pt refused PO medications, provided pt education on importance of taking medication. Pt states "medication is not working".

## 2023-07-12 NOTE — ED PROVIDER NOTE - NSFOLLOWUPINSTRUCTIONS_ED_ALL_ED_FT
Please follow up with your psychiatrist.    During your ER/ stay you received your standing night time medications.     Follow up with your primary care physician and psychiatrist in 48-72 hours.

## 2023-07-12 NOTE — ED ADULT NURSE NOTE - NSFALLUNIVINTERV_ED_ALL_ED
Bed/Stretcher in lowest position, wheels locked, appropriate side rails in place/Call bell, personal items and telephone in reach/Instruct patient to call for assistance before getting out of bed/chair/stretcher/Non-slip footwear applied when patient is off stretcher/[place] to call system/Physically safe environment - no spills, clutter or unnecessary equipment/Purposeful proactive rounding/Room/bathroom lighting operational, light cord in reach

## 2023-07-14 NOTE — ED ADULT NURSE NOTE - ED CARDIAC RATE
Detail Level: Detailed
Quality 226: Preventive Care And Screening: Tobacco Use: Screening And Cessation Intervention: Patient screened for tobacco use and is an ex/non-smoker
Quality 431: Preventive Care And Screening: Unhealthy Alcohol Use - Screening: Patient not identified as an unhealthy alcohol user when screened for unhealthy alcohol use using a systematic screening method
Quality 110: Preventive Care And Screening: Influenza Immunization: Influenza Immunization Administered during Influenza season
normal

## 2023-07-18 ENCOUNTER — APPOINTMENT (OUTPATIENT)
Dept: NEUROLOGY | Facility: CLINIC | Age: 38
End: 2023-07-18
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
## 2023-07-18 NOTE — DISCUSSION/SUMMARY
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 \par Paroxysmal anxiety / abdominal fluttering. \par Mood / bipolar disorder\par \par EMU [facility] 7/2022 results consistent with Psychogenic Nonepileptic Attacks.  Debriefed.\par No evidence for seizures/spikes interictally.\par Recommend AED only for migraine/mood stabilization.\par Interestingly, off VPA/TPM reports no HAs, and no mood worsening.\par \par Plan:\par continue Oxcarbazepine 150 mg BID\par continue follow up with psychology/psychiatry recommended\par monitor Na levels on OXC/neuroleptics ie q3mo.  Some risk of SIADH\par follow up in 6 months

## 2023-07-23 ENCOUNTER — EMERGENCY (EMERGENCY)
Facility: HOSPITAL | Age: 38
LOS: 1 days | Discharge: HOME | End: 2023-07-23
Attending: EMERGENCY MEDICINE | Admitting: EMERGENCY MEDICINE
Payer: MEDICARE

## 2023-07-23 VITALS
RESPIRATION RATE: 15 BRPM | SYSTOLIC BLOOD PRESSURE: 104 MMHG | DIASTOLIC BLOOD PRESSURE: 67 MMHG | OXYGEN SATURATION: 100 % | TEMPERATURE: 98 F | HEART RATE: 106 BPM

## 2023-07-23 PROCEDURE — 99285 EMERGENCY DEPT VISIT HI MDM: CPT

## 2023-07-23 RX ORDER — ACETAMINOPHEN 500 MG
975 TABLET ORAL ONCE
Refills: 0 | Status: COMPLETED | OUTPATIENT
Start: 2023-07-23 | End: 2023-07-23

## 2023-07-23 RX ADMIN — Medication 975 MILLIGRAM(S): at 22:31

## 2023-07-23 RX ADMIN — Medication 975 MILLIGRAM(S): at 23:19
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
## 2023-07-23 NOTE — ED PROVIDER NOTE - PATIENT PORTAL LINK FT
You can access the FollowMyHealth Patient Portal offered by [facility] by registering at the following website: [email or web address]. By joining [employer]’s FollowMyHealth portal, you will also be able to view your health information using other applications (apps) compatible with our system.

## 2023-07-23 NOTE — ED PROVIDER NOTE - PHYSICAL EXAMINATION
CONSTITUTIONAL: Well-appearing; well-nourished; in no apparent distress. Non-toxic appearing.   NEURO: Alert & oriented. Gait steady without assistance. Sensory and motor functions are grossly intact.  PSYCH: Mood appropriate. Thought processes intact.   EYES: PERRL, EOMs intact. No lid edema, crusting, proptosis. Sclera white, conjunctiva pink. No obvious FBs noted to the cornea.   ENT:  Nose midline, mildly tender to bridge, no rhinorrhea. Posteior pharynx is pink. No tonsil edema or exudates. Uvula midline.   NECK: Supple  CARD: Regular rate and rhythm, no murmurs  RESP: No accessory muscle use; breath sounds clear and equal bilaterally; no wheezes, rhonchi, or rales     ABD: Soft; non-distended; non-tender.  No guarding or rebound.   MUSCULOSKELETAL/EXTREMITIES: FROM in all four extremities; no extremity edema.  SKIN: Warm; dry; no apparent lesions or exudate

## 2023-07-23 NOTE — ED PROVIDER NOTE - PROGRESS NOTE DETAILS
JORGE L Bolivar: Pt made aware pf results, shoulder xray reviewed by me; no acute fx- will arrange for transport back to her facility. All questions were answered, pt verbalized understanding.
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
## 2023-07-23 NOTE — ED PROVIDER NOTE - DISPOSITION TYPE
V:  01/25/21  Referred by: Sena Garrido is here to f/u om warts. At the last visit  1. Verruca vulgaris  - 1 lesion injected with intralesional immunotherapy: 0.3ML of candida (NDC #: [address]-26) antigen (#1, R2 dorsal)  Plan after visit       -- WartPeel, a special compound of 5FU and salicylic acid.    - Keep lesions greasy with Aquaphor or vaseline   Today parent states    2.  Molluscum Contagiosum  At the last visit we discussed treatment options including observation, cantharidin, LN and prescription treatments.  - Rarely this viral process can lead to scarring.   After discussion treatment options family elected for candida injection     HPI: 7 year old male presents for warts on hands, elbow and something on face also.  Not sure if face is warts?  right eyelid and clusters by mouth and under chin.  Initially thought these may be HERPES SIMPLEX VIRUS  But they never blistered and have not changed.  Asymptomatic/      Present for > 1 year but have spread more recently.  On face within past couple weeks.  Treatment tried otc that some he would take off right away as that may have hurt some.    ROS: besides positives in HPI all other systems negative    The allergy and medication lists were reviewed in the electronic medical record today. Patient reports that he has never smoked. He has never used smokeless tobacco. Patient's family history is not on file.    PHYSICAL EXAMINATION:   General: well appearing  Orientation and Mood: alert and oriented  Skin:   A focused exam of the face, neck and upper extremities  was performed today.   Verrucous papules R2 x 5, R3 x 3, right forearm x 1, L2 x 5, L3 x 3  Umbilicated papules all very small and clustered on face lower cutaneous lip and chin x ~ 10    Assessment /Plan:  1. Verruca vulgaris  - The viral etiology, potential for spontaneous resolution, and the difficulty in eradicating these lesions were reviewed.   - I reviewed treatment options for  warts with the family, including topical treatments: over the counter salicylic acid preparations and WartPeel, cryotherapy and intralesional immunotherapy.    - The need for multiple treatments to achieve resolution regardless of the treatment modality was discussed.  - After discussing options,   - 1 lesion injected with intralesional immunotherapy: 0.3ML of candida (NDC #: [address]-97) antigen (#1, R2 dorsal)  - Keep lesions greasy with Aquaphor or vaseline   - Once the inflammation from cryotherapy resolves, I advised the family to start:         -- WartPeel, a special compound of 5FU and salicylic acid.  The Rx is made by [facility] in [place]. You can  the medication or it will be mailed to your home.  You can submit the payment to your insurance company afterwards.  If you do not hear from [employer] in the next 2-3 days, please call them at [phone].  Instructed parent to use this only on warts, not molluscum.    2.  Molluscum Contagiosum  - We discussed the viral etiology of molluscum, its course, and treatment options including observation. Molluscum always spontaneously resolves in patients within 1-2 years. Inflamed lesions are a good sign in that it indicates an active immune response to the infection.    - We discussed treatment options including observation, cantharidin, LN and prescription treatments.  - Rarely this viral process can lead to scarring.   After discussion treatment options family elected for    PROCEDURE NOTE: Intralesional Candida antigen injection  DIAGNOSIS: Verruca vulgaris and molluscum  After discussing the risks, the procedure was consented to.  The above sites were cleaned with alcohol.  Intralesional injections of the above medication were performed.  The patient was observed for fifteen minutes following the injections. Aftercare was discussed.  Patient tolerated procedure well.    No follow-ups on file.    Collaborating physician:  Dr Sanchez  [place]  Electronically Signed by:  Tiffanie Wilson, JULIO, 2/19/2021       DISCHARGE
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
## 2023-07-23 NOTE — ED ADULT NURSE NOTE - NSFALLUNIVINTERV_ED_ALL_ED
Bed/Stretcher in lowest position, wheels locked, appropriate side rails in place/Call bell, personal items and telephone in reach/Instruct patient to call for assistance before getting out of bed/chair/stretcher/Non-slip footwear applied when patient is off stretcher/[place] to call system/Physically safe environment - no spills, clutter or unnecessary equipment/Purposeful proactive rounding/Room/bathroom lighting operational, light cord in reach
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
## 2023-07-24 ENCOUNTER — NON-APPOINTMENT (OUTPATIENT)
Age: 38
End: 2023-07-24

## 2023-07-24 ENCOUNTER — EMERGENCY (EMERGENCY)
Facility: HOSPITAL | Age: 38
LOS: 0 days | Discharge: HOME | End: 2023-07-25
Attending: STUDENT IN AN ORGANIZED HEALTH CARE EDUCATION/TRAINING PROGRAM
Payer: MEDICARE

## 2023-07-24 VITALS
WEIGHT: 293 LBS | DIASTOLIC BLOOD PRESSURE: 76 MMHG | SYSTOLIC BLOOD PRESSURE: 107 MMHG | RESPIRATION RATE: 15 BRPM | HEART RATE: 93 BPM | OXYGEN SATURATION: 98 % | TEMPERATURE: 99 F | HEIGHT: 68 IN

## 2023-07-24 VITALS
TEMPERATURE: 98 F | SYSTOLIC BLOOD PRESSURE: 110 MMHG | DIASTOLIC BLOOD PRESSURE: 70 MMHG | RESPIRATION RATE: 16 BRPM | HEART RATE: 80 BPM | OXYGEN SATURATION: 100 %

## 2023-07-24 DIAGNOSIS — Z86.718 PERSONAL HISTORY OF OTHER VENOUS THROMBOSIS AND EMBOLISM: ICD-10-CM

## 2023-07-24 DIAGNOSIS — R06.02 SHORTNESS OF BREATH: ICD-10-CM

## 2023-07-24 DIAGNOSIS — Z79.01 LONG TERM (CURRENT) USE OF ANTICOAGULANTS: ICD-10-CM

## 2023-07-24 DIAGNOSIS — Z91.018 ALLERGY TO OTHER FOODS: ICD-10-CM

## 2023-07-24 DIAGNOSIS — J45.909 UNSPECIFIED ASTHMA, UNCOMPLICATED: ICD-10-CM

## 2023-07-24 DIAGNOSIS — F31.9 BIPOLAR DISORDER, UNSPECIFIED: ICD-10-CM

## 2023-07-24 DIAGNOSIS — Z91.040 LATEX ALLERGY STATUS: ICD-10-CM

## 2023-07-24 DIAGNOSIS — Z87.42 PERSONAL HISTORY OF OTHER DISEASES OF THE FEMALE GENITAL TRACT: ICD-10-CM

## 2023-07-24 DIAGNOSIS — Z88.8 ALLERGY STATUS TO OTHER DRUGS, MEDICAMENTS AND BIOLOGICAL SUBSTANCES: ICD-10-CM

## 2023-07-24 DIAGNOSIS — E11.9 TYPE 2 DIABETES MELLITUS WITHOUT COMPLICATIONS: ICD-10-CM

## 2023-07-24 DIAGNOSIS — Z88.6 ALLERGY STATUS TO ANALGESIC AGENT: ICD-10-CM

## 2023-07-24 DIAGNOSIS — R07.9 CHEST PAIN, UNSPECIFIED: ICD-10-CM

## 2023-07-24 DIAGNOSIS — Z88.0 ALLERGY STATUS TO PENICILLIN: ICD-10-CM

## 2023-07-24 DIAGNOSIS — D68.51 ACTIVATED PROTEIN C RESISTANCE: ICD-10-CM

## 2023-07-24 DIAGNOSIS — I10 ESSENTIAL (PRIMARY) HYPERTENSION: ICD-10-CM

## 2023-07-24 DIAGNOSIS — Z86.69 PERSONAL HISTORY OF OTHER DISEASES OF THE NERVOUS SYSTEM AND SENSE ORGANS: ICD-10-CM

## 2023-07-24 DIAGNOSIS — E03.9 HYPOTHYROIDISM, UNSPECIFIED: ICD-10-CM

## 2023-07-24 DIAGNOSIS — F25.9 SCHIZOAFFECTIVE DISORDER, UNSPECIFIED: ICD-10-CM

## 2023-07-24 LAB
ALBUMIN SERPL ELPH-MCNC: 3.1 G/DL — LOW (ref 3.3–5)
ALP SERPL-CCNC: 71 U/L — SIGNIFICANT CHANGE UP (ref 40–120)
ALT FLD-CCNC: 26 U/L — SIGNIFICANT CHANGE UP (ref 12–78)
ANION GAP SERPL CALC-SCNC: 2 MMOL/L — LOW (ref 5–17)
AST SERPL-CCNC: 21 U/L — SIGNIFICANT CHANGE UP (ref 15–37)
BASOPHILS # BLD AUTO: 0.01 K/UL — SIGNIFICANT CHANGE UP (ref 0–0.2)
BASOPHILS NFR BLD AUTO: 0.2 % — SIGNIFICANT CHANGE UP (ref 0–2)
BILIRUB SERPL-MCNC: 0.2 MG/DL — SIGNIFICANT CHANGE UP (ref 0.2–1.2)
BUN SERPL-MCNC: 12 MG/DL — SIGNIFICANT CHANGE UP (ref 7–23)
CALCIUM SERPL-MCNC: 8.4 MG/DL — LOW (ref 8.5–10.1)
CHLORIDE SERPL-SCNC: 111 MMOL/L — HIGH (ref 96–108)
CO2 SERPL-SCNC: 29 MMOL/L — SIGNIFICANT CHANGE UP (ref 22–31)
CREAT SERPL-MCNC: 0.79 MG/DL — SIGNIFICANT CHANGE UP (ref 0.5–1.3)
D DIMER BLD IA.RAPID-MCNC: 501 NG/ML DDU — HIGH
EGFR: 99 ML/MIN/1.73M2 — SIGNIFICANT CHANGE UP
EOSINOPHIL # BLD AUTO: 0.04 K/UL — SIGNIFICANT CHANGE UP (ref 0–0.5)
EOSINOPHIL NFR BLD AUTO: 0.7 % — SIGNIFICANT CHANGE UP (ref 0–6)
GLUCOSE SERPL-MCNC: 114 MG/DL — HIGH (ref 70–99)
HCG SERPL-ACNC: <1 MIU/ML — SIGNIFICANT CHANGE UP
HCT VFR BLD CALC: 32.6 % — LOW (ref 34.5–45)
HGB BLD-MCNC: 10.5 G/DL — LOW (ref 11.5–15.5)
IMM GRANULOCYTES NFR BLD AUTO: 0.7 % — SIGNIFICANT CHANGE UP (ref 0–0.9)
LYMPHOCYTES # BLD AUTO: 1.64 K/UL — SIGNIFICANT CHANGE UP (ref 1–3.3)
LYMPHOCYTES # BLD AUTO: 28.8 % — SIGNIFICANT CHANGE UP (ref 13–44)
MCHC RBC-ENTMCNC: 30.2 PG — SIGNIFICANT CHANGE UP (ref 27–34)
MCHC RBC-ENTMCNC: 32.2 G/DL — SIGNIFICANT CHANGE UP (ref 32–36)
MCV RBC AUTO: 93.7 FL — SIGNIFICANT CHANGE UP (ref 80–100)
MONOCYTES # BLD AUTO: 0.65 K/UL — SIGNIFICANT CHANGE UP (ref 0–0.9)
MONOCYTES NFR BLD AUTO: 11.4 % — SIGNIFICANT CHANGE UP (ref 2–14)
NEUTROPHILS # BLD AUTO: 3.31 K/UL — SIGNIFICANT CHANGE UP (ref 1.8–7.4)
NEUTROPHILS NFR BLD AUTO: 58.2 % — SIGNIFICANT CHANGE UP (ref 43–77)
NRBC # BLD: 0 /100 WBCS — SIGNIFICANT CHANGE UP (ref 0–0)
NT-PROBNP SERPL-SCNC: 34 PG/ML — SIGNIFICANT CHANGE UP (ref 0–125)
PLATELET # BLD AUTO: 153 K/UL — SIGNIFICANT CHANGE UP (ref 150–400)
POTASSIUM SERPL-MCNC: 4.8 MMOL/L — SIGNIFICANT CHANGE UP (ref 3.5–5.3)
POTASSIUM SERPL-SCNC: 4.8 MMOL/L — SIGNIFICANT CHANGE UP (ref 3.5–5.3)
PROT SERPL-MCNC: 7.1 GM/DL — SIGNIFICANT CHANGE UP (ref 6–8.3)
RBC # BLD: 3.48 M/UL — LOW (ref 3.8–5.2)
RBC # FLD: 15.9 % — HIGH (ref 10.3–14.5)
SODIUM SERPL-SCNC: 142 MMOL/L — SIGNIFICANT CHANGE UP (ref 135–145)
TROPONIN I, HIGH SENSITIVITY RESULT: <3 NG/L — SIGNIFICANT CHANGE UP
WBC # BLD: 5.69 K/UL — SIGNIFICANT CHANGE UP (ref 3.8–10.5)
WBC # FLD AUTO: 5.69 K/UL — SIGNIFICANT CHANGE UP (ref 3.8–10.5)

## 2023-07-24 PROCEDURE — 70486 CT MAXILLOFACIAL W/O DYE: CPT | Mod: 26,MA

## 2023-07-24 PROCEDURE — 71275 CT ANGIOGRAPHY CHEST: CPT | Mod: 26

## 2023-07-24 PROCEDURE — 73030 X-RAY EXAM OF SHOULDER: CPT | Mod: 26,RT

## 2023-07-24 PROCEDURE — 93010 ELECTROCARDIOGRAM REPORT: CPT | Mod: 77

## 2023-07-24 PROCEDURE — 99285 EMERGENCY DEPT VISIT HI MDM: CPT

## 2023-07-24 PROCEDURE — 71045 X-RAY EXAM CHEST 1 VIEW: CPT | Mod: 26

## 2023-07-24 PROCEDURE — 70450 CT HEAD/BRAIN W/O DYE: CPT | Mod: 26,MA

## 2023-07-24 PROCEDURE — 93010 ELECTROCARDIOGRAM REPORT: CPT

## 2023-07-24 RX ORDER — MORPHINE SULFATE 50 MG/1
4 CAPSULE, EXTENDED RELEASE ORAL EVERY 4 HOURS
Refills: 0 | Status: DISCONTINUED | OUTPATIENT
Start: 2023-07-24 | End: 2023-07-24

## 2023-07-24 RX ORDER — DIPHENHYDRAMINE HCL 50 MG
25 CAPSULE ORAL ONCE
Refills: 0 | Status: COMPLETED | OUTPATIENT
Start: 2023-07-24 | End: 2023-07-24

## 2023-07-24 RX ORDER — MORPHINE SULFATE 50 MG/1
4 CAPSULE, EXTENDED RELEASE ORAL ONCE
Refills: 0 | Status: DISCONTINUED | OUTPATIENT
Start: 2023-07-24 | End: 2023-07-24

## 2023-07-24 RX ADMIN — MORPHINE SULFATE 4 MILLIGRAM(S): 50 CAPSULE, EXTENDED RELEASE ORAL at 21:15

## 2023-07-24 RX ADMIN — MORPHINE SULFATE 4 MILLIGRAM(S): 50 CAPSULE, EXTENDED RELEASE ORAL at 22:32

## 2023-07-24 RX ADMIN — Medication 25 MILLIGRAM(S): at 21:15

## 2023-07-24 RX ADMIN — Medication 25 MILLIGRAM(S): at 06:44

## 2023-07-24 NOTE — ED PROVIDER NOTE - OBJECTIVE STATEMENT
38 F pmh schizoaffective disorder, factor V Leiden, asthma, DM presenting to the ED for chest pain. Patient states that she was at rest when she had sudden onset chest pain a/w shortness of breath. She denies nausea, vomiting, diarrhea, headache, weakness, fatigue    patient states that she has a history of clots in the past for which she is on eliquis  Patient from [facility] 38 F pmh schizoaffective disorder, factor V Leiden, asthma, DM presenting to the ED for chest pain. Patient states that she was at rest when she had sudden onset chest pain a/w shortness of breath. She denies nausea, vomiting, diarrhea, headache, weakness, fatigue    patient states that she has a history of clots in the past for which she is on Eliquis  Patient from [facility]

## 2023-07-24 NOTE — ED PROVIDER NOTE - PATIENT PORTAL LINK FT
You can access the FollowMyHealth Patient Portal offered by [facility] by registering at the following website: [email or web address]. By joining [facility]’s FollowMyHealth portal, you will also be able to view your health information using other applications (apps) compatible with our system.

## 2023-07-24 NOTE — ED PROVIDER NOTE - NS ED ROS FT
General: Denies fever, chills  HEENT: Denies sensory changes, sore throat  Neck: Denies neck pain, neck stiffness  Resp: +, SOB  Cardiovascular: +CP  GI: Denies nausea, vomiting, abdominal pain, diarrhea, constipation, blood in stool  : Denies dysuria, hematuria, frequency, incontinence  MSK: Denies back pain  Neuro: Denies HA, dizziness, numbness, weakness  Skin: Denies rashes.

## 2023-07-24 NOTE — ED ADULT NURSE NOTE - SUICIDE SCREENING QUESTION 2
Patient called and reports was told would have a urine culture done during visit.  Patient was upset about this on the phone. discussed with patient the patient come back to clinic and we can do another urine culture or patient can go follow-up with primary care.  Patient reports would like to follow-up with primary care Discussed with patient no urine culture was found in the last urine culture found was from March of 2021 patient reports symptoms as getting better and improving discussed with patient to continue to take antibiotics and if this gets worse to please come back or go to emergency room.  Patient verbalized understanding, and had no further questions.  
No

## 2023-07-24 NOTE — ED ADULT TRIAGE NOTE - CHIEF COMPLAINT QUOTE
BIBA from home for chest pain and dizziness that started at around 1230 today  hx of seizure, asthma, hypertension, hypothyroidism, endometriosis

## 2023-07-24 NOTE — ED PROVIDER NOTE - PHYSICAL EXAMINATION
General: Well appearing female in no acute distress  HEENT: Normocephalic, atraumatic. Moist mucous membranes. Oropharynx clear. No lymphadenopathy.  Eyes: No scleral icterus. EOMI. FAIZAN.  Neck:. Soft and supple. Full ROM without pain. No midline tenderness  Cardiac: Regular rate and regular rhythm. No murmurs, rubs, gallops. Peripheral pulses 2+ and symmetric. No LE edema.  Resp: Lungs CTAB. Speaking in full sentences. No wheezes, rales or rhonchi.  Abd: Soft, non-tender, non-distended. No guarding or rebound. No scars, masses, or lesions.  Back: Spine midline and non-tender. No CVA tenderness.    Skin: No rashes, abrasions, or lacerations.  Neuro: AO x 3. Moves all extremities symmetrically. Motor strength and sensation grossly intact.

## 2023-07-24 NOTE — ED PROVIDER NOTE - PROGRESS NOTE DETAILS
Mmai: Pt care signed out to me at change of shift, pending CTA. CT negative for PE. On re-eval, pt resting comfortably, watching TV. Pt requesting Percocet for pain, pt in NAD. Pt offered Tylenol & is amenable. Pt requesting meal & night time medications. Stable for d/c to group home. Recommend close outpatient PCP f/u. Return signs / symptoms d/w pt. She understands / agrees w/ this plan.
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
## 2023-07-24 NOTE — ED PROVIDER NOTE - WR INTERPRETATION 1
No pneumothorax, No free air, No CHF, No cardiomegaly, No pleural effusions  questionable opacities over b/l lower lobes undetermined 2/2 body habitus   normal CXR

## 2023-07-24 NOTE — ED PROVIDER NOTE - CLINICAL SUMMARY MEDICAL DECISION MAKING FREE TEXT BOX
38 F presenting to the ED w/ chest pain    hx factor V Leiden, clots on eliquis  bipolar/schizoaffective disorder    EKG NSR  concern for PE, ACS  elevated d-dimer, trop negative  CT PE  hold on ASA given allergy  pain control  admit for intractable pain, dispo pending CTA

## 2023-07-24 NOTE — ED ADULT NURSE NOTE - NSFALLUNIVINTERV_ED_ALL_ED
Bed/Stretcher in lowest position, wheels locked, appropriate side rails in place/Call bell, personal items and telephone in reach/Instruct patient to call for assistance before getting out of bed/chair/stretcher/Non-slip footwear applied when patient is off stretcher/[place] to call system/Physically safe environment - no spills, clutter or unnecessary equipment/Purposeful proactive rounding/Room/bathroom lighting operational, light cord in reach

## 2023-07-25 ENCOUNTER — NON-APPOINTMENT (OUTPATIENT)
Age: 38
End: 2023-07-25

## 2023-07-25 VITALS
SYSTOLIC BLOOD PRESSURE: 129 MMHG | HEART RATE: 68 BPM | OXYGEN SATURATION: 98 % | TEMPERATURE: 98 F | DIASTOLIC BLOOD PRESSURE: 90 MMHG | RESPIRATION RATE: 17 BRPM

## 2023-07-25 PROCEDURE — 70450 CT HEAD/BRAIN W/O DYE: CPT | Mod: 26,MA

## 2023-07-25 RX ORDER — METHOCARBAMOL 500 MG/1
2 TABLET, FILM COATED ORAL
Refills: 0 | DISCHARGE

## 2023-07-25 RX ORDER — METHOCARBAMOL 500 MG/1
500 TABLET, FILM COATED ORAL ONCE
Refills: 0 | Status: DISCONTINUED | OUTPATIENT
Start: 2023-07-25 | End: 2023-07-25

## 2023-07-25 RX ORDER — LANOLIN ALCOHOL/MO/W.PET/CERES
3 CREAM (GRAM) TOPICAL AT BEDTIME
Refills: 0 | Status: DISCONTINUED | OUTPATIENT
Start: 2023-07-25 | End: 2023-07-25

## 2023-07-25 RX ORDER — LEVETIRACETAM 250 MG/1
1000 TABLET, FILM COATED ORAL ONCE
Refills: 0 | Status: COMPLETED | OUTPATIENT
Start: 2023-07-25 | End: 2023-07-25

## 2023-07-25 RX ORDER — APIXABAN 2.5 MG/1
1 TABLET, FILM COATED ORAL
Refills: 0 | DISCHARGE

## 2023-07-25 RX ORDER — RISPERIDONE 4 MG/1
3 TABLET ORAL ONCE
Refills: 0 | Status: DISCONTINUED | OUTPATIENT
Start: 2023-07-25 | End: 2023-07-25

## 2023-07-25 RX ORDER — APIXABAN 2.5 MG/1
5 TABLET, FILM COATED ORAL ONCE
Refills: 0 | Status: DISCONTINUED | OUTPATIENT
Start: 2023-07-25 | End: 2023-07-25

## 2023-07-25 RX ORDER — RISPERIDONE 4 MG/1
1 TABLET ORAL
Refills: 0 | DISCHARGE

## 2023-07-25 RX ORDER — DIPHENHYDRAMINE HCL 50 MG
25 CAPSULE ORAL ONCE
Refills: 0 | Status: DISCONTINUED | OUTPATIENT
Start: 2023-07-25 | End: 2023-07-25

## 2023-07-25 RX ORDER — ACETAMINOPHEN 500 MG
650 TABLET ORAL ONCE
Refills: 0 | Status: COMPLETED | OUTPATIENT
Start: 2023-07-25 | End: 2023-07-25

## 2023-07-25 RX ADMIN — Medication 650 MILLIGRAM(S): at 01:11

## 2023-07-25 RX ADMIN — LEVETIRACETAM 400 MILLIGRAM(S): 250 TABLET, FILM COATED ORAL at 02:52

## 2023-07-25 NOTE — ED ADULT NURSE REASSESSMENT NOTE - NS ED NURSE REASSESS COMMENT FT1
when pt was presented with discharge papers Pt with stable VS, equal and unlabored respirations. Pt asked "Why am I not being admitted, Pt asking for percocet and benadryl". Pt after given papers pt had seizure like activity, with stable VS- patent airway no response to verbal stimuli. Pt responding to noxious and painful stimuli. Pt remains on cardiac monitor. Dr. Dave called to bedside and meds given as per MD.

## 2023-07-25 NOTE — ED ADULT NURSE REASSESSMENT NOTE - NS ED NURSE REASSESS COMMENT FT1
Pt A&Ox3. Pt awake and responsive to verbal stimuli at this time. VS stable. Pt asked to change into her clothes. Pt is ready to go home. Pt ambulatory with steady gait with no IV.
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
## 2023-08-05 NOTE — ED PROVIDER NOTE - OBJECTIVE STATEMENT
Event Note
Orthopedics/Event Note
32 y/o female with a PMHx of meningioma is present in the ED c/o headaches that has been occurring since Dec of 2018. Pt reports her headaches come and go and despite taking advil, her pain reoccurs. Her headaches is diffused with radiation to her neck. Her associating symptoms include photosensitivity and nausea. In addition to her headaches she reports of vaginal bleeding x3 days. LMP was x2 weeks ago without concerns for pregnancy. Pt with x3 saturated pads a day. Pt states she was just discharged from another ED an hour before arrival to the ED and states they "didn't do anything for me". Pt with several pages that include blood results, urinalysis and transvaginal US advised to fu with GYN for dysfunctional uterine bleeding. In addition pt reports urinary symptoms. Pt denies the following: fever, chills, trauma, fall, confusion, difficulty ambulating, abdominal pain, diarrhea, sick contacts, rash, neck pain. Pt reports no vaginal intercourse for several months with no concerns for stds.

## 2023-08-07 NOTE — ED ADULT TRIAGE NOTE - HEIGHT IN INCHES
· 8

## 2023-08-11 NOTE — BH CONSULTATION LIAISON ASSESSMENT NOTE - OTHER
Patient trach capped mid afternoon.  Pt on 3 liter nasal cannula .  Tolerating trach cap well.     sleepy domiciled in [facility]  quiet required repeating of questions d/t sleepiness  limited
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
## 2023-08-26 ENCOUNTER — NON-APPOINTMENT (OUTPATIENT)
Age: 38
End: 2023-08-26

## 2023-08-26 ENCOUNTER — EMERGENCY (EMERGENCY)
Facility: HOSPITAL | Age: 38
LOS: 1 days | Discharge: HOME | End: 2023-08-26
Attending: EMERGENCY MEDICINE | Admitting: EMERGENCY MEDICINE
Payer: MEDICARE

## 2023-08-26 VITALS
HEART RATE: 104 BPM | TEMPERATURE: 98 F | DIASTOLIC BLOOD PRESSURE: 81 MMHG | HEIGHT: 68 IN | SYSTOLIC BLOOD PRESSURE: 130 MMHG | OXYGEN SATURATION: 100 % | RESPIRATION RATE: 15 BRPM

## 2023-08-26 VITALS
RESPIRATION RATE: 16 BRPM | DIASTOLIC BLOOD PRESSURE: 70 MMHG | SYSTOLIC BLOOD PRESSURE: 108 MMHG | HEART RATE: 83 BPM | TEMPERATURE: 98 F | OXYGEN SATURATION: 97 %

## 2023-08-26 VITALS
TEMPERATURE: 98 F | HEIGHT: 68 IN | RESPIRATION RATE: 16 BRPM | OXYGEN SATURATION: 99 % | DIASTOLIC BLOOD PRESSURE: 72 MMHG | HEART RATE: 68 BPM | SYSTOLIC BLOOD PRESSURE: 112 MMHG

## 2023-08-26 LAB — SARS-COV-2 RNA SPEC QL NAA+PROBE: DETECTED

## 2023-08-26 PROCEDURE — 99285 EMERGENCY DEPT VISIT HI MDM: CPT

## 2023-08-26 PROCEDURE — 93010 ELECTROCARDIOGRAM REPORT: CPT

## 2023-08-26 PROCEDURE — 71045 X-RAY EXAM CHEST 1 VIEW: CPT | Mod: 26

## 2023-08-26 PROCEDURE — 99284 EMERGENCY DEPT VISIT MOD MDM: CPT

## 2023-08-26 RX ORDER — APIXABAN 2.5 MG/1
5 TABLET, FILM COATED ORAL ONCE
Refills: 0 | Status: COMPLETED | OUTPATIENT
Start: 2023-08-26 | End: 2023-08-26

## 2023-08-26 RX ORDER — ACETAMINOPHEN 500 MG
650 TABLET ORAL ONCE
Refills: 0 | Status: COMPLETED | OUTPATIENT
Start: 2023-08-26 | End: 2023-08-26

## 2023-08-26 RX ORDER — NALTREXONE HYDROCHLORIDE 50 MG/1
50 TABLET, FILM COATED ORAL ONCE
Refills: 0 | Status: COMPLETED | OUTPATIENT
Start: 2023-08-26 | End: 2023-08-26

## 2023-08-26 RX ORDER — LEVOTHYROXINE SODIUM 125 MCG
100 TABLET ORAL DAILY
Refills: 0 | Status: DISCONTINUED | OUTPATIENT
Start: 2023-08-26 | End: 2023-08-26

## 2023-08-26 RX ORDER — QUETIAPINE FUMARATE 200 MG/1
175 TABLET, FILM COATED ORAL ONCE
Refills: 0 | Status: COMPLETED | OUTPATIENT
Start: 2023-08-26 | End: 2023-08-26

## 2023-08-26 RX ORDER — RISPERIDONE 4 MG/1
2 TABLET ORAL ONCE
Refills: 0 | Status: COMPLETED | OUTPATIENT
Start: 2023-08-26 | End: 2023-08-26

## 2023-08-26 RX ORDER — POLYETHYLENE GLYCOL 3350 17 G/17G
17 POWDER, FOR SOLUTION ORAL ONCE
Refills: 0 | Status: COMPLETED | OUTPATIENT
Start: 2023-08-26 | End: 2023-08-26

## 2023-08-26 RX ORDER — ESCITALOPRAM OXALATE 10 MG/1
20 TABLET, FILM COATED ORAL ONCE
Refills: 0 | Status: COMPLETED | OUTPATIENT
Start: 2023-08-26 | End: 2023-08-26

## 2023-08-26 RX ORDER — DIVALPROEX SODIUM 500 MG/1
750 TABLET, DELAYED RELEASE ORAL ONCE
Refills: 0 | Status: COMPLETED | OUTPATIENT
Start: 2023-08-26 | End: 2023-08-26

## 2023-08-26 RX ADMIN — RISPERIDONE 2 MILLIGRAM(S): 4 TABLET ORAL at 13:44

## 2023-08-26 RX ADMIN — DIVALPROEX SODIUM 750 MILLIGRAM(S): 500 TABLET, DELAYED RELEASE ORAL at 13:47

## 2023-08-26 RX ADMIN — Medication 0.1 MILLIGRAM(S): at 13:44

## 2023-08-26 RX ADMIN — ESCITALOPRAM OXALATE 20 MILLIGRAM(S): 10 TABLET, FILM COATED ORAL at 13:45

## 2023-08-26 RX ADMIN — ESCITALOPRAM OXALATE 20 MILLIGRAM(S): 10 TABLET, FILM COATED ORAL at 13:59

## 2023-08-26 RX ADMIN — QUETIAPINE FUMARATE 175 MILLIGRAM(S): 200 TABLET, FILM COATED ORAL at 13:47

## 2023-08-26 RX ADMIN — Medication 650 MILLIGRAM(S): at 15:15

## 2023-08-26 RX ADMIN — NALTREXONE HYDROCHLORIDE 50 MILLIGRAM(S): 50 TABLET, FILM COATED ORAL at 13:59

## 2023-08-26 RX ADMIN — POLYETHYLENE GLYCOL 3350 17 GRAM(S): 17 POWDER, FOR SOLUTION ORAL at 13:43

## 2023-08-26 RX ADMIN — APIXABAN 5 MILLIGRAM(S): 2.5 TABLET, FILM COATED ORAL at 13:45

## 2023-08-26 RX ADMIN — Medication 100 MICROGRAM(S): at 13:45

## 2023-08-26 RX ADMIN — Medication 650 MILLIGRAM(S): at 23:48

## 2023-08-26 NOTE — ED PROVIDER NOTE - WR ORDER ID 1
[FreeTextEntry1] : The patient is a 73 year-old male with history of head and neck cancer who is status post percutaneous endoscopic gastrostomy.  The patient is tolerating his p.o. intake well and was advised to have the feeding tube removed. \par \par Now, s/p upper endoscopy, removal of PEG tube and electrofulguration of granulation tissue on 8/2/22.\par \par Here today for post procedure evaluation. \par \par I have independently reviewed the medical records at the time of this office consultation. PEG site healing well. Good PO intake; Patient overall feeling well. No need for further Thoracic Surgery follow up. Return to clinic as needed. Continue follow up with Radonc and Hemeonc. \par \par I personally performed the services described in the documentation, reviewed the documentation recorded by the scribe in my presence and it accurately and completely records my words and actions.\par \par VERO, PERRY Madden-C, am scribing for and the presence of Dr. CHAVEZ [facility], the following sections HISTORY OF PRESENT ILLNESS, PAST MEDICAL/FAMILY/SOCIAL HISTORY; REVIEW OF SYSTEMS; VITAL SIGNS; PHYSICAL EXAM; DISPOSITION.\par \par \par 
[id number]

## 2023-08-26 NOTE — ED PROVIDER NOTE - PATIENT PORTAL LINK FT
You can access the FollowMyHealth Patient Portal offered by [facility] by registering at the following website: [email or web address]. By joining [facility]’s FollowMyHealth portal, you will also be able to view your health information using other applications (apps) compatible with our system.

## 2023-08-26 NOTE — PROVIDER CONTACT NOTE (OTHER) - ASSESSMENT
Per medical team ([id number]) pt is for discharge. RAVINDRA called pt's group home: [phone] and on 4 attempts no answer. RAVINDRA conducted chart review and outreached staff Edwin [phone] . Edwin informed pt is for discharge. Edwin confirmed pt's mode of transportation is Ambulance for pt to return back to group home ([facility] located at [address] [place] [address]. RAVINDRA outreached MAS: [phone] and scheduled the following trip with rep: Pat | Per medical team ([id number]) pt is for discharge. RAVINDRA called pt's group home: [phone] and on 4 attempts no answer. RAVINDRA conducted chart review and outreached staff Edwin [phone] . Edwin informed pt is for discharge. Edwin confirmed pt's mode of transportation is Ambulance for pt to return back to group home ([facility]), located at [address] [place] [address]. RAVINDRA outreached MAS: [phone] and scheduled a BLS transportation with vendor [employer]: [phone] utilizing rep Stewart (Invoice #[phone])

## 2023-08-26 NOTE — ED PROVIDER NOTE - OBJECTIVE STATEMENT
38-year-old female with past medical history of asthma, DM, factor V Leiden, bipolar disorder, schizoaffective disorder presents today from  [facility] facility for agitation after not receiving her morning meds.  Patient states that she was returning from [facility] at 11 AM where she was found to be COVID-positive.  Facility did not give pt medication because it was past the admission window to receive morning medications.  Nurse felt unsafe after patient was agitated and called EMS.  Admits to chills, nausea, chest pressure rhinorrhea, sore throat, sinus congestion.  Denies fever, SOB, abdominal pain, urinary symptoms, changes in bowel. Denies SI/HI or hallucination. States that she is upset that she did not receive her morning medication and is currently calm, but crying.

## 2023-08-26 NOTE — ED PROVIDER NOTE - NSFOLLOWUPINSTRUCTIONS_ED_ALL_ED_FT
You were seen in the ER today for evaluation after missing your medications and being diagnosed with COVID.    We gave you your morning doses of medications today.    Please follow up with your primary care doctor within 1 - 3 days. Call and let them know you were seen in the ER today.   Bring the results of your blood work and imaging with you to your appointment, if applicable.    For pain, please take acetaminophen 650 mg every 6 hours for pain/fevers.     Return to the ER for any worsening symptoms or concerns, including chest pain, shortness of breath, lightheadedness, weakness, or any other concerns.

## 2023-08-26 NOTE — ED PROVIDER NOTE - PHYSICAL EXAMINATION
CONSTITUTIONAL: Well appearing, well nourished, awake, alert, oriented to person, place, time/situation and in no apparent distress  ENMT: Airway patent,   Head atraumatic  EYES: Clear bilaterally  CARDIAC: Normal rate, regular rhythm.  RESPIRATORY: Breath sounds clear and equal bilaterally.  MUSCULOSKELETAL: Spine appears normal, no deformities, equal active FROM bilaterally  NEUROLOGIC: CN II-XII grossly intact, moves all extremities without lateralization  SKIN: Exposed skin normal color for race, warm, dry and intact

## 2023-08-26 NOTE — ED PROVIDER NOTE - CLINICAL SUMMARY MEDICAL DECISION MAKING FREE TEXT BOX
38-year-old female with past medical history of asthma, DM, factor V Leiden, bipolar disorder, schizoaffective disorder presents today from  [facility] facility for agitation after not receiving her morning meds.  Patient states that she was returning from [facility] at 11 AM where she was found to be COVID-positive.  Facility did not give pt medication because it was past the admission window to receive morning medications.  Nurse felt unsafe after patient was agitated and called EMS.  Admits to chills, nausea, chest pressure rhinorrhea, sore throat, sinus congestion.  Denies fever, SOB, abdominal pain, urinary symptoms, changes in bowel. Denies SI/HI or hallucination. States that she is upset that she did not receive her morning medication and is currently calm, but crying.    Hemodynamically stable. Given hx and physical exam pt is experiencing COVID-like symptoms and crying because she has yet to receive morning medication. Not acutely requiring any medical intervention. Will receive morning medication and reassess pt. Likely transport back to facility. 38-year-old female with past medical history of asthma, DM, factor V Leiden, bipolar disorder, schizoaffective disorder presents today from  [facility] for agitation after not receiving her morning meds.  Patient states that she was returning from [facility] at 11 AM where she was found to be COVID-positive.  Facility did not give pt medication because it was past the admission window to receive morning medications.  Nurse felt unsafe after patient was agitated and called EMS.  Admits to chills, nausea, chest pressure rhinorrhea, sore throat, sinus congestion.  Denies fever, SOB, abdominal pain, urinary symptoms, changes in bowel. Denies SI/HI or hallucination. States that she is upset that she did not receive her morning medication and is currently calm, but crying.    Hemodynamically stable. Given hx and physical exam pt is experiencing COVID-like symptoms and crying because she has yet to receive morning medication. Not acutely requiring any medical intervention. RVP and CXR to assess viral URI vs PNA. Will receive morning medication and reassess pt. Likely transport back to facility.

## 2023-08-26 NOTE — ED ADULT NURSE NOTE - NSFALLUNIVINTERV_ED_ALL_ED
Bed/Stretcher in lowest position, wheels locked, appropriate side rails in place/Call bell, personal items and telephone in reach/Instruct patient to call for assistance before getting out of bed/chair/stretcher/Non-slip footwear applied when patient is off stretcher/[place] to call system/Physically safe environment - no spills, clutter or unnecessary equipment/Purposeful proactive rounding/Room/bathroom lighting operational, light cord in reach

## 2023-08-26 NOTE — ED PROVIDER NOTE - PHYSICAL EXAMINATION
Gen: middle aged woman non-toxic appearing, actively crying  Head: normal appearing  HEENT: normal conjunctiva, oral mucosa moist, erythematous oropharynx, congested nasal turbinates b/l  Lung: no respiratory distress, speaking in full sentences, CTA b/l     CV: regular rate and rhythm, no murmurs  Abd: soft, non distended, non tender   MSK: no visible deformities  Neuro: No focal deficits, AAOx3  Skin: Warm  Psych: normal affect
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
## 2023-08-26 NOTE — ED ADULT NURSE NOTE - CHIEF COMPLAINT QUOTE
Pt h/o bipolar disorder, presents covid + since yesterday, has not been able to take her medication, appeared agitated due to not receiving medication, denies SI/HI, no hallucinations, calm and cooperative.

## 2023-08-26 NOTE — PROVIDER CONTACT NOTE (OTHER) - RECOMMENDATIONS
[facility] rep who scheduled trip [place] | 60-90 mins p/u
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
## 2023-08-26 NOTE — ED PROVIDER NOTE - NSFOLLOWUPINSTRUCTIONS_ED_ALL_ED_FT
you were evaluated for a closed head injury during your ER visit.  You may take Tylenol over-the-counter per package instructions as needed for headache.  Please seek medical attention if your symptoms worsen every concerns.
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
## 2023-08-26 NOTE — ED PROVIDER NOTE - PROGRESS NOTE DETAILS
Kevin Lema DO (PGY1)  Called [facility]. Discussed that she will receive her morning meds and will be scheduled to transport back to facility.  Barbara at facility agreed and informed me of her morning meds twice for confirmation. Yolis Gramajo DO PGY-3  Patient had complained of some chest tightness - obtained EKG which was NSR without TWI/SUDEEP, and CXR which was clear without evidence of pneumonia. Patient continues to remain calm and cooperate in ED, no signs of SI/HI, no auditory/visual hallucinations. Medically cleared for discharge home. Facility was informed.  Discussed with social work to arrange for discharge back to facility. Yolis Gramajo DO PGY-3  Patient had complained of some chest tightness - obtained EKG which was NSR without TWI/SUDEEP, and CXR which was clear without evidence of pneumonia. Patient continues to remain calm and cooperative in ED, no signs of SI/HI, no auditory/visual hallucinations. Medically cleared for discharge home. Facility was informed.  Discussed with social work to arrange for discharge back to facility.

## 2023-08-26 NOTE — PROVIDER CONTACT NOTE (OTHER) - ASSESSMENT
Medical team referred this case as pt has been medically cleared for discharged back to her group [place]. Writer spoke with patient's group home  staff member BANDAR Jimenez# [phone].Barbara confirmed  Address and pt's mode of transportation is an Ambulance.  [facility] located at [address] [place] [address]. Writer contacted MAS spoke with Ms. Chow  and  set up this trip through [facility]  [phone]  and trip invoice number # [id number] .  Non Emergent ambulance form has been filled out by MD and has been attached to the pt's envelope for EMS. Writer reached out to [facility]  [phone]  spoke with MsTyra Vidya [facility] trip # [id number]  and pt will be picked up at 5:00 pm.  Medical team and pt was informed about this ETA and in agreement with this plan. verbal Huddle completed. No further social work intervention is needed for this visit.

## 2023-08-26 NOTE — ED PROVIDER NOTE - ATTENDING CONTRIBUTION TO CARE
38-year-old female with an extensive past medical history presenting from a group home.  Patient was seen at [facility] yesterday where she was diagnosed with COVID.  Patient states that she has had fever runny nose sore throat and cough.  She did not get home until this morning and the group home would not administer her her medication therefore she became agitated.  The nurse felt unsafe therefore she called EMS.  Patient is upset that she did not receive her morning medication.  She denies any SI, HI.  We will reach out to the group home to confirm the story.  Patient well-appearing.  Will administer medications that were missing on the group and was able to accept COVID-patient back we will call social work for transport.    General: Well appearing, well nourished, crying   Head: Normocephalic, atraumatic  Eyes: Conjunctiva clear, sclera non-icteric  Mouth: Mucous membranes moist, no mucosal lesions, mild erythema no exudates   Extremities: No amputations or deformities, cyanosis, edema  Musculoskeletal: RHODES, pulses intact  Neurologic: No gross   Skin: Warm,dry.

## 2023-08-26 NOTE — ED ADULT NURSE NOTE - OBJECTIVE STATEMENT
pt to rm 22 isolation precaution in place pt swabbed for covid  pt on 1;1 observation for safety   pt off her meds pt to rm 22 isolation precaution in place pt swabbed for covid  pt on 1;1 observation for safety   pt off her meds/ pt has hx of code grey

## 2023-08-26 NOTE — ED ADULT NURSE REASSESSMENT NOTE - NS ED NURSE REASSESS COMMENT FT1
pt given meds as ordered. remains on 1;1 observation. pt calm and cooperative / awaiting  x ray results/

## 2023-08-26 NOTE — ED ADULT TRIAGE NOTE - BP NONINVASIVE SYSTOLIC (MM HG)
----- Message from Shazia Oakes Shazia  ----- Message -----  From: Enriqueta Jimenez RN  Sent: 3/24/2023   8:55 AM CDT  To: Shazia Oakes DO, #    Hi Dr. Oakes,    Met with Rosa Martinez Enriqueta Jimenez RN    [facility] [place]  [phone]       130

## 2023-08-26 NOTE — ED PROVIDER NOTE - PATIENT PORTAL LINK FT
You can access the FollowMyHealth Patient Portal offered by [facility] by registering at the following website: [email or web address]. By joining [facility]’s FollowMyHealth portal, you will also be able to view your health information using other applications (apps) compatible with our system.

## 2023-08-26 NOTE — ED ADULT NURSE NOTE - NSFALLUNIVINTERV_ED_ALL_ED
Bed/Stretcher in lowest position, wheels locked, appropriate side rails in place/Call bell, personal items and telephone in reach/Instruct patient to call for assistance before getting out of bed/chair/stretcher/Non-slip footwear applied when patient is off stretcher/[place] to call system/Physically safe environment - no spills, clutter or unnecessary equipment/Purposeful proactive rounding/Room/bathroom lighting operational, light cord in reach
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
## 2023-08-27 VITALS
SYSTOLIC BLOOD PRESSURE: 132 MMHG | TEMPERATURE: 98 F | DIASTOLIC BLOOD PRESSURE: 76 MMHG | HEART RATE: 97 BPM | OXYGEN SATURATION: 99 % | RESPIRATION RATE: 16 BRPM

## 2023-08-27 RX ORDER — RISPERIDONE 4 MG/1
3 TABLET ORAL ONCE
Refills: 0 | Status: COMPLETED | OUTPATIENT
Start: 2023-08-27 | End: 2023-08-27

## 2023-08-27 RX ORDER — APIXABAN 2.5 MG/1
2.5 TABLET, FILM COATED ORAL ONCE
Refills: 0 | Status: COMPLETED | OUTPATIENT
Start: 2023-08-27 | End: 2023-08-27

## 2023-08-27 RX ORDER — DIPHENHYDRAMINE HCL 50 MG
25 CAPSULE ORAL ONCE
Refills: 0 | Status: COMPLETED | OUTPATIENT
Start: 2023-08-27 | End: 2023-08-27

## 2023-08-27 RX ORDER — METHOCARBAMOL 500 MG/1
1000 TABLET, FILM COATED ORAL ONCE
Refills: 0 | Status: COMPLETED | OUTPATIENT
Start: 2023-08-27 | End: 2023-08-27

## 2023-08-27 RX ADMIN — METHOCARBAMOL 1000 MILLIGRAM(S): 500 TABLET, FILM COATED ORAL at 00:53

## 2023-08-27 RX ADMIN — RISPERIDONE 3 MILLIGRAM(S): 4 TABLET ORAL at 00:54

## 2023-08-27 RX ADMIN — APIXABAN 2.5 MILLIGRAM(S): 2.5 TABLET, FILM COATED ORAL at 00:53

## 2023-08-27 RX ADMIN — Medication 25 MILLIGRAM(S): at 00:53

## 2023-08-27 NOTE — ED ADULT NURSE REASSESSMENT NOTE - NS ED NURSE REASSESS COMMENT FT1
pt remains at baseline mental status, RR even unlabored completing full sentences. pt resting in stretcher comfortably at this time, no new complaints offered. stretcher lowest position siderails up safety measures in place. pt medicated with home meds per order. pt dc/ed pending EMS pickup

## 2023-08-29 NOTE — PATIENT PROFILE ADULT - NSPROGENSOURCEINFO_GEN_A_NUR
Call was placed out to patient in regards to scheduled OB appt tomorrow. Patient was seen in the ED on 8/23 and by US was 4w5d. Per Dr. Montoya, too early for another ultrasound, as long as patient is not having bleeding or cramping appointment should be moved to about 8 weeks (9/20/23).   patient

## 2023-09-06 NOTE — ED ADULT NURSE NOTE - NSSUHOSCREENINGYN_ED_ALL_ED
Kayleigh Rock is a 89 year old female who presents to IR today for diagnostic cerebral angiogram and possible re treatment of left MCA aneurysm with Dr. Freeman.     Medical history significant for cerebral aneurysm, a flutter on eliquis, HLD, osteopenia, rosacea, esophageal reflux. Pt has history of LMCA aneurysm s/p stent assisted coiling ([place] stent) on 8/14/2020. She had f/up CTA H done 2021 and 2022 without evidence of aneurysm recurrence. On CTA from 7/23/2023 there is concern there is aneurysm recurrence.     Patient was seen and provided medical clearance for procedure on 8/29/2023 by Dr. Vinay Rangel. Recent labs done and reviewed, unremarkable. EKG done unremarkable. All have been reviewed and are in epic. Type and screen and PTT today pending.     Patient has been NPO since midnight. Patient denies HA, vision changes, chest pain, SOB, numbness/tingling, muscle weakness, pain, discomfort.    Vitals:    09/06/23 1022   BP: (!) 147/93   Pulse: 66   Resp: 17   Temp: 97.2 °F (36.2 °C)          EXAM  Alert and oriented x 3, no acute distress  Saturating well on room air  NSR on monitor  EOM intact, PERRLA, visual fields intact bilaterally  Face grossly symmetric, tongue midline  Shoulder shrug symmetrical  Speech fluent and nondysarthric  Follows commands  Strength 5/5 throughout all extremities   SILT  Distal peripheral pulses 2+ throughout    Procedure discussed in detail with patient including indications, risks, and recovery. Questions answered. Written consent obtained. Patient dispo to IR post procedure if diagnostic angiogram only or NCCU if intervention was performed. Discussed with Dr. Freeman.       Please [facility] with questions or concerns.     Ierne Ridley PA-C  [facility]  09/06/23    Yes - the patient is able to be screened

## 2023-09-07 NOTE — ED PROVIDER NOTE - OBJECTIVE STATEMENT
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 Pt was just discharged from [facility] for abscess in R arm. 2d ago she started having L upper arm pain after getting IM medication. It started becoming more painful and today she felt increased pain with firmness, erythema in the area. It is worse with movement. She describes it as similar pain to what happened to her R arm. She has no fever, cp, sob, abd pain. She is on her menses. Opt out

## 2023-09-12 NOTE — PROVIDER CONTACT NOTE (CRITICAL VALUE NOTIFICATION) - NAME OF MD/NP/PA/DO NOTIFIED:
Continue with advisement of PCP.  Keep follow up with PCP.  Go to the ER as needed for worsening condition.     MD Walton

## 2023-09-16 NOTE — BH CONSULTATION LIAISON ASSESSMENT NOTE - NSBHSACOC_PSY_A_CORE FT
1. Check labs today   2. Rest left shoulder  x 3 days   3. Cont. Legal medical marijuana.    4. Start prednisone 10mg a day   5.. Return to clinic in 3-4  months see hpi

## 2023-09-19 ENCOUNTER — APPOINTMENT (OUTPATIENT)
Dept: NEUROLOGY | Facility: CLINIC | Age: 38
End: 2023-09-19
Payer: MEDICARE

## 2023-09-19 PROCEDURE — 99213 OFFICE O/P EST LOW 20 MIN: CPT

## 2023-09-27 ENCOUNTER — APPOINTMENT (OUTPATIENT)
Dept: INTERNAL MEDICINE | Facility: CLINIC | Age: 38
End: 2023-09-27

## 2023-10-06 NOTE — ED PROVIDER NOTE - NSFOLLOWUPINSTRUCTIONS_ED_ALL_ED_FT
Left message for patient to call back regarding heart scan results. Unable to review heart scan result with patient as no answer. CAC score 0, No Incidental finding noted on heart scan report. Unable to discuss heart healthy lifestyle and importance of risk factor modification with patient, as well as importance of continued compliance with PCP screenings for hypertension, diabetes, cholesterol. Educational information sent to patient via portal. Per the EMR MD has seen the report.  
Drink plenty of fluids.  Take your medications as prescribed.  Follow-up with your PMD in 24-48 hours.  Return to the emergency department for any new or worsening symptoms.

## 2023-10-18 NOTE — BH CONSULTATION LIAISON PROGRESS NOTE - LEVEL OF CONSCIOUSNESS
Avis,       Per Dr. Starks, you don't need labs again until December, and you already have those scheduled for the day you see him and have your scan. We cancelled your November appt. Please contact your care team if you have any questions. Thanks!                  Page Matos RN                   [facility]  
Alert

## 2023-10-30 NOTE — DISCHARGE NOTE NURSING/CASE MANAGEMENT/SOCIAL WORK - DATE OF FIRST COVID-19 BOOSTER
----- Message from Osmin Justin DO sent at 10/27/2023  9:33 PM CDT -----  Normal EKG, cleared for surgery.   12-Feb-2022

## 2023-11-09 ENCOUNTER — NON-APPOINTMENT (OUTPATIENT)
Age: 38
End: 2023-11-09

## 2023-11-13 ENCOUNTER — NON-APPOINTMENT (OUTPATIENT)
Age: 38
End: 2023-11-13

## 2023-11-16 NOTE — ED PROVIDER NOTE - DISPOSITION TYPE
# Left patellar dislocation: Guillermina [place]  was seen today for left patella dislocation. Symptoms had been going on for since 11/5/2023 in the setting of twisting her knee. On examination there are positive findings of mild tenderness to palpation over the kneecap, increased laxity with lateral translation of the kneecap. Imaging findings showed no fracture. Likely cause of patient's condition due to patella dislocation here for follow-up today. Counseled patient and mother on nature of condition and treatment options.  Given this plan as below, follow-up 1 to 2 months as needed     Image Findings: No fracture on previous x-rays  Treatment: Activities as tolerated, home exercises given today, referral to PT, Hossein pull knee brace given today  School: As tolerated  Medications/Injections: Limited tylenol/ibuprofen for pain for 1-2 weeks, topical Voltaren gel, none  Follow-up: In one month if symptoms do not improve, sooner if worsening  Can consider further evaluation, possible MRI    Please call [phone]   Ask for my team if you have any questions or concerns    If you have not yet received the influenza vaccine but would like to get one, please call  [phone] or you can schedule via [employer]    It was great seeing you today!    Richard Morelos MD, CAQSM              
DISCHARGE

## 2023-12-19 NOTE — ED PROVIDER NOTE - TEMPLATE, MLM
-- DO NOT REPLY / DO NOT REPLY ALL --  -- Message is from [employer] ([facility]) --    General Patient Message:     Patient called said his cough has gotten worse and he would like to get in to see the doctor asap. Patient said to have the doctor give him a call asap.      Caller Information         Type Contact Phone/Fax    12/19/2023 08:13 AM CST Phone (Incoming) Abhinav Waldron Esq. (Self)           Alternative phone number: [phone]    Can a detailed message be left? Yes    Message Turnaround: [place]:    Refer to site's KB page for routing instructions    Please give this turnaround time to the caller:   \"You can expect to receive a response 1-3 business days after your provider's clinical team reviews the message\"               General

## 2023-12-25 ENCOUNTER — EMERGENCY (EMERGENCY)
Facility: HOSPITAL | Age: 38
LOS: 1 days | Discharge: HOME | End: 2023-12-25
Attending: EMERGENCY MEDICINE | Admitting: EMERGENCY MEDICINE
Payer: MEDICARE

## 2023-12-25 VITALS
SYSTOLIC BLOOD PRESSURE: 119 MMHG | OXYGEN SATURATION: 97 % | DIASTOLIC BLOOD PRESSURE: 80 MMHG | RESPIRATION RATE: 18 BRPM | TEMPERATURE: 98 F | HEART RATE: 87 BPM

## 2023-12-25 LAB
ALBUMIN SERPL ELPH-MCNC: 3.9 G/DL — SIGNIFICANT CHANGE UP (ref 3.3–5)
ALBUMIN SERPL ELPH-MCNC: 3.9 G/DL — SIGNIFICANT CHANGE UP (ref 3.3–5)
ALP SERPL-CCNC: 69 U/L — SIGNIFICANT CHANGE UP (ref 40–120)
ALP SERPL-CCNC: 69 U/L — SIGNIFICANT CHANGE UP (ref 40–120)
ALT FLD-CCNC: 52 U/L — HIGH (ref 4–33)
ALT FLD-CCNC: 52 U/L — HIGH (ref 4–33)
ANION GAP SERPL CALC-SCNC: 8 MMOL/L — SIGNIFICANT CHANGE UP (ref 7–14)
ANION GAP SERPL CALC-SCNC: 8 MMOL/L — SIGNIFICANT CHANGE UP (ref 7–14)
AST SERPL-CCNC: 37 U/L — HIGH (ref 4–32)
AST SERPL-CCNC: 37 U/L — HIGH (ref 4–32)
BASOPHILS # BLD AUTO: 0.01 K/UL — SIGNIFICANT CHANGE UP (ref 0–0.2)
BASOPHILS # BLD AUTO: 0.01 K/UL — SIGNIFICANT CHANGE UP (ref 0–0.2)
BASOPHILS NFR BLD AUTO: 0.3 % — SIGNIFICANT CHANGE UP (ref 0–2)
BASOPHILS NFR BLD AUTO: 0.3 % — SIGNIFICANT CHANGE UP (ref 0–2)
BILIRUB SERPL-MCNC: <0.2 MG/DL — SIGNIFICANT CHANGE UP (ref 0.2–1.2)
BILIRUB SERPL-MCNC: <0.2 MG/DL — SIGNIFICANT CHANGE UP (ref 0.2–1.2)
BUN SERPL-MCNC: 13 MG/DL — SIGNIFICANT CHANGE UP (ref 7–23)
BUN SERPL-MCNC: 13 MG/DL — SIGNIFICANT CHANGE UP (ref 7–23)
CALCIUM SERPL-MCNC: 8.6 MG/DL — SIGNIFICANT CHANGE UP (ref 8.4–10.5)
CALCIUM SERPL-MCNC: 8.6 MG/DL — SIGNIFICANT CHANGE UP (ref 8.4–10.5)
CHLORIDE SERPL-SCNC: 105 MMOL/L — SIGNIFICANT CHANGE UP (ref 98–107)
CHLORIDE SERPL-SCNC: 105 MMOL/L — SIGNIFICANT CHANGE UP (ref 98–107)
CO2 SERPL-SCNC: 26 MMOL/L — SIGNIFICANT CHANGE UP (ref 22–31)
CO2 SERPL-SCNC: 26 MMOL/L — SIGNIFICANT CHANGE UP (ref 22–31)
CREAT SERPL-MCNC: 0.79 MG/DL — SIGNIFICANT CHANGE UP (ref 0.5–1.3)
CREAT SERPL-MCNC: 0.79 MG/DL — SIGNIFICANT CHANGE UP (ref 0.5–1.3)
EGFR: 99 ML/MIN/1.73M2 — SIGNIFICANT CHANGE UP
EGFR: 99 ML/MIN/1.73M2 — SIGNIFICANT CHANGE UP
EOSINOPHIL # BLD AUTO: 0.03 K/UL — SIGNIFICANT CHANGE UP (ref 0–0.5)
EOSINOPHIL # BLD AUTO: 0.03 K/UL — SIGNIFICANT CHANGE UP (ref 0–0.5)
EOSINOPHIL NFR BLD AUTO: 0.8 % — SIGNIFICANT CHANGE UP (ref 0–6)
EOSINOPHIL NFR BLD AUTO: 0.8 % — SIGNIFICANT CHANGE UP (ref 0–6)
GLUCOSE SERPL-MCNC: 123 MG/DL — HIGH (ref 70–99)
GLUCOSE SERPL-MCNC: 123 MG/DL — HIGH (ref 70–99)
HCT VFR BLD CALC: 30.9 % — LOW (ref 34.5–45)
HCT VFR BLD CALC: 30.9 % — LOW (ref 34.5–45)
HGB BLD-MCNC: 10.1 G/DL — LOW (ref 11.5–15.5)
HGB BLD-MCNC: 10.1 G/DL — LOW (ref 11.5–15.5)
IANC: 1.78 K/UL — LOW (ref 1.8–7.4)
IANC: 1.78 K/UL — LOW (ref 1.8–7.4)
IMM GRANULOCYTES NFR BLD AUTO: 1.3 % — HIGH (ref 0–0.9)
IMM GRANULOCYTES NFR BLD AUTO: 1.3 % — HIGH (ref 0–0.9)
LYMPHOCYTES # BLD AUTO: 1.51 K/UL — SIGNIFICANT CHANGE UP (ref 1–3.3)
LYMPHOCYTES # BLD AUTO: 1.51 K/UL — SIGNIFICANT CHANGE UP (ref 1–3.3)
LYMPHOCYTES # BLD AUTO: 38.2 % — SIGNIFICANT CHANGE UP (ref 13–44)
LYMPHOCYTES # BLD AUTO: 38.2 % — SIGNIFICANT CHANGE UP (ref 13–44)
MCHC RBC-ENTMCNC: 30 PG — SIGNIFICANT CHANGE UP (ref 27–34)
MCHC RBC-ENTMCNC: 30 PG — SIGNIFICANT CHANGE UP (ref 27–34)
MCHC RBC-ENTMCNC: 32.7 GM/DL — SIGNIFICANT CHANGE UP (ref 32–36)
MCHC RBC-ENTMCNC: 32.7 GM/DL — SIGNIFICANT CHANGE UP (ref 32–36)
MCV RBC AUTO: 91.7 FL — SIGNIFICANT CHANGE UP (ref 80–100)
MCV RBC AUTO: 91.7 FL — SIGNIFICANT CHANGE UP (ref 80–100)
MONOCYTES # BLD AUTO: 0.57 K/UL — SIGNIFICANT CHANGE UP (ref 0–0.9)
MONOCYTES # BLD AUTO: 0.57 K/UL — SIGNIFICANT CHANGE UP (ref 0–0.9)
MONOCYTES NFR BLD AUTO: 14.4 % — HIGH (ref 2–14)
MONOCYTES NFR BLD AUTO: 14.4 % — HIGH (ref 2–14)
NEUTROPHILS # BLD AUTO: 1.78 K/UL — LOW (ref 1.8–7.4)
NEUTROPHILS # BLD AUTO: 1.78 K/UL — LOW (ref 1.8–7.4)
NEUTROPHILS NFR BLD AUTO: 45 % — SIGNIFICANT CHANGE UP (ref 43–77)
NEUTROPHILS NFR BLD AUTO: 45 % — SIGNIFICANT CHANGE UP (ref 43–77)
NRBC # BLD: 0 /100 WBCS — SIGNIFICANT CHANGE UP (ref 0–0)
NRBC # BLD: 0 /100 WBCS — SIGNIFICANT CHANGE UP (ref 0–0)
NRBC # FLD: 0 K/UL — SIGNIFICANT CHANGE UP (ref 0–0)
NRBC # FLD: 0 K/UL — SIGNIFICANT CHANGE UP (ref 0–0)
NT-PROBNP SERPL-SCNC: <36 PG/ML — SIGNIFICANT CHANGE UP
NT-PROBNP SERPL-SCNC: <36 PG/ML — SIGNIFICANT CHANGE UP
PLATELET # BLD AUTO: 115 K/UL — LOW (ref 150–400)
PLATELET # BLD AUTO: 115 K/UL — LOW (ref 150–400)
POTASSIUM SERPL-MCNC: 4.4 MMOL/L — SIGNIFICANT CHANGE UP (ref 3.5–5.3)
POTASSIUM SERPL-MCNC: 4.4 MMOL/L — SIGNIFICANT CHANGE UP (ref 3.5–5.3)
POTASSIUM SERPL-SCNC: 4.4 MMOL/L — SIGNIFICANT CHANGE UP (ref 3.5–5.3)
POTASSIUM SERPL-SCNC: 4.4 MMOL/L — SIGNIFICANT CHANGE UP (ref 3.5–5.3)
PROT SERPL-MCNC: 7 G/DL — SIGNIFICANT CHANGE UP (ref 6–8.3)
PROT SERPL-MCNC: 7 G/DL — SIGNIFICANT CHANGE UP (ref 6–8.3)
RBC # BLD: 3.37 M/UL — LOW (ref 3.8–5.2)
RBC # BLD: 3.37 M/UL — LOW (ref 3.8–5.2)
RBC # FLD: 16.2 % — HIGH (ref 10.3–14.5)
RBC # FLD: 16.2 % — HIGH (ref 10.3–14.5)
SODIUM SERPL-SCNC: 139 MMOL/L — SIGNIFICANT CHANGE UP (ref 135–145)
SODIUM SERPL-SCNC: 139 MMOL/L — SIGNIFICANT CHANGE UP (ref 135–145)
TROPONIN T, HIGH SENSITIVITY RESULT: <6 NG/L — SIGNIFICANT CHANGE UP
TROPONIN T, HIGH SENSITIVITY RESULT: <6 NG/L — SIGNIFICANT CHANGE UP
WBC # BLD: 3.95 K/UL — SIGNIFICANT CHANGE UP (ref 3.8–10.5)
WBC # BLD: 3.95 K/UL — SIGNIFICANT CHANGE UP (ref 3.8–10.5)
WBC # FLD AUTO: 3.95 K/UL — SIGNIFICANT CHANGE UP (ref 3.8–10.5)
WBC # FLD AUTO: 3.95 K/UL — SIGNIFICANT CHANGE UP (ref 3.8–10.5)

## 2023-12-25 PROCEDURE — 71046 X-RAY EXAM CHEST 2 VIEWS: CPT | Mod: 26

## 2023-12-25 PROCEDURE — 93010 ELECTROCARDIOGRAM REPORT: CPT

## 2023-12-25 PROCEDURE — 99285 EMERGENCY DEPT VISIT HI MDM: CPT

## 2023-12-25 RX ORDER — METHOCARBAMOL 500 MG/1
500 TABLET, FILM COATED ORAL ONCE
Refills: 0 | Status: COMPLETED | OUTPATIENT
Start: 2023-12-25 | End: 2023-12-26

## 2023-12-25 RX ORDER — ACETAMINOPHEN 500 MG
1000 TABLET ORAL ONCE
Refills: 0 | Status: COMPLETED | OUTPATIENT
Start: 2023-12-25 | End: 2023-12-25

## 2023-12-25 RX ADMIN — Medication 400 MILLIGRAM(S): at 22:24

## 2023-12-25 NOTE — ED ADULT TRIAGE NOTE - CHIEF COMPLAINT QUOTE
pt  c/o intermittent worsening chest pain and SOB x 1 day. chest pain worse with deep breath. phx clotting issues with PE on Eliquis, NIDDM, see chart for rest of history pt  c/o intermittent worsening chest pain and SOB x 1 day. chest pain worse with deep breath. also c/o headache. endorses multiple sick contacts with flu and stomach virus. phx clotting issues with PE on Eliquis, NIDDM, see chart for rest of history

## 2023-12-25 NOTE — ED ADULT TRIAGE NOTE - AS TEMP SITE
[employer] Patient Education © 2017 [employer].  oral

## 2023-12-26 VITALS
HEART RATE: 86 BPM | DIASTOLIC BLOOD PRESSURE: 72 MMHG | RESPIRATION RATE: 20 BRPM | OXYGEN SATURATION: 100 % | SYSTOLIC BLOOD PRESSURE: 109 MMHG | TEMPERATURE: 98 F

## 2023-12-26 RX ORDER — ASPIRIN/CALCIUM CARB/MAGNESIUM 324 MG
81 TABLET ORAL DAILY
Refills: 0 | Status: DISCONTINUED | OUTPATIENT
Start: 2023-12-26 | End: 2023-12-26

## 2023-12-26 RX ORDER — DIPHENHYDRAMINE HCL 50 MG
25 CAPSULE ORAL ONCE
Refills: 0 | Status: COMPLETED | OUTPATIENT
Start: 2023-12-26 | End: 2023-12-26

## 2023-12-26 RX ADMIN — Medication 25 MILLIGRAM(S): at 02:53

## 2023-12-26 RX ADMIN — METHOCARBAMOL 500 MILLIGRAM(S): 500 TABLET, FILM COATED ORAL at 02:13

## 2023-12-26 RX ADMIN — Medication 1 MILLIGRAM(S): at 01:23

## 2023-12-26 RX ADMIN — Medication 10 MILLIGRAM(S): at 02:52

## 2023-12-26 NOTE — ED PROVIDER NOTE - PHYSICAL EXAMINATION
Const: Well-nourished, Well-developed, appearing stated age.  Eyes: no conjunctival injection, and symmetrical lids.  HEENT: Head NCAT, no lesions. Atraumatic external nose and ears. Moist MM.  Neck: Symmetric, trachea midline.   RESP: Unlabored respiratory effort.   MSK: Normocephalic/Atraumatic  Skin: Warm, dry and intact.   Neuro: CNs II-XII grossly intact. Motor & Sensation grossly intact.  Psych: . Appropriate mood and affect.

## 2023-12-26 NOTE — PROVIDER CONTACT NOTE (OTHER) - SITUATION
Sw contacted by medical team ,patient cleared for discharge and needs transportation back to  facility. SW contacted by the medical team ,patient cleared for discharge and needs transportation back to the group home. SW contacted by the medical team, patient cleared for discharge and needs transportation back to the group home.

## 2023-12-26 NOTE — ED ADULT NURSE NOTE - CHIEF COMPLAINT QUOTE
pt  c/o intermittent worsening chest pain and SOB x 1 day. chest pain worse with deep breath. also c/o headache. endorses multiple sick contacts with flu and stomach virus. phx clotting issues with PE on Eliquis, NIDDM, see chart for rest of history

## 2023-12-26 NOTE — ED PROVIDER NOTE - ATTENDING CONTRIBUTION TO CARE
38-year-old female history of asthma, DM, schizoaffective disorder coming from group home presenting to the ED for chest pain for the past day,   Nonradiating, moderate pain associated with shortness of breath.  No fevers, chills.  No nausea, vomiting.  no Abdominal pain.    Per chart review, patient has complex care note and is best avoid opiates and patient while in the emergency department.

## 2023-12-26 NOTE — ED PROVIDER NOTE - NSFOLLOWUPINSTRUCTIONS_ED_ALL_ED_FT
it was a pleasure caring for you today.  Please make sure to stay hydrated and get plenty of rest.  Please make sure to follow-up with your primary care doctor in the next 2 to 3 days for follow-up care.  Please return to the hospital if you have any new or worsening symptoms including chest pain, shortness of breath, dizziness, passing out. It was a pleasure caring for you today.  Please make sure to stay hydrated and get plenty of rest.  Please make sure to follow-up with your primary care doctor in the next 2 to 3 days for follow-up care.  Please return to the hospital if you have any new or worsening symptoms including chest pain, shortness of breath, dizziness, passing out.

## 2023-12-26 NOTE — PROVIDER CONTACT NOTE (OTHER) - ASSESSMENT
RAVINDRA called the group home and spoke with Demi (MIN) who confirmed patient should return vis BLS to [address] [place] [address].  RAVINDRA calleed RAVINDRA called the group home and spoke with Demi (MIN) who confirmed patient should return vis BLS to [address] [place] [address].  RAVINDRA calleed RAVINDRA called the group home and spoke with Demi (MIN) who confirmed patient should return vis BLS to [address] [place] [address].  RAVINDRA called [facility] to arrange for BLS tranport RAVINDRA called the group home and spoke with Demi (MIN) who confirmed patient should return vis BLS to [address] [place] [address].  RAVINDRA called [facility] to arrange for BLS tranport RAVINDRA called [employer]. group home, and spoke with Demi LUONG) who confirmed patient should return via BLS to [address] [place] [address].  RAVINDRA called [facility] to arrange for BLS transport RAVINDRA called [employer]. group home, and spoke with Demi LUONG) who confirmed patient should return via BLS to [address] [place] [address].  RAVINDRA called [facility] to arrange for BLS transport RAVINDRA called [employer]. group [place], and spoke with Demi LUONG) who confirmed patient should return via BLS to [address] [place] [address].  RAVINDRA called [facility] and received auth #[id number] for BLS transport of patient by [facility], trip #51A. ETA 4:30am RAVINDRA called [employer]. group [place], and spoke with Demi LUONG) who confirmed patient should return via BLS to [address] [place] [address].  RAVINDRA called [facility] and received auth #[id number] for BLS transport of patient by [facility], trip #51A. ETA 4:30am RAVINDRA called [employer]. group [place], and spoke with Demi LUONG) who confirmed patient should return via BLS to [address] [place] [address].  RAVINDRA called [facility] and received auth #[id number] for BLS transport of patient by [facility] ([phone]), trip #51A. ETA 4:30am. RAVINDRA called [employer]. group [place], and spoke with Demi LUONG) who confirmed patient should return via BLS to [address] [place] [address].  RAVINDRA called [facility] and received auth #[id number] for BLS transport of patient by [facility] ([phone]), trip #51A. ETA 4:30am. RAVINDRA called [employer]. group [place], and spoke with Demi LUONG) who confirmed patient should return via BLS to [address] [place] [address].  RAVINDRA called [facility] and received auth #[id number] for BLS transport of patient by [facility] ([phone]), trip #51A. ETA 4:30am.  Verbal huddle regarding coordination of care with interdisciplinary team completed. RAVINDRA called [employer]. group [place], and spoke with Demi LUONG) who confirmed patient should return via BLS to [address] [place] [address].  RAVINDRA called [facility] and received auth #[id number] for BLS transport of patient by [facility] ([phone]), trip #51A. ETA 4:30am.  Verbal huddle regarding coordination of care with interdisciplinary team completed. RAVINDRA called [employer]. group [place], and spoke with Demi LUONG) who confirmed patient should return via BLS to [address] [place] [address].  RAVINDRA called [facility] and received auth #[id number] for BLS transport of patient by [facility] ([phone]), trip #51A. ETA 4:30am.  RAVINDRA called ([phone]) [facility] received new ETA of 6:30am.  Verbal huddle regarding coordination of care with interdisciplinary team completed. RAVINDRA called [employer]. group [place], and spoke with Demi LUONG) who confirmed patient should return via BLS to [address] [place] [address].  RAVINDRA called [facility] and received auth #[id number] for BLS transport of patient by skilled nursing ([phone]), trip #51A. ETA 4:30am.  RAVINDRA called ([phone]) [facility] received new ETA of 6:30am.  Verbal huddle regarding coordination of care with interdisciplinary team completed.

## 2023-12-26 NOTE — ED ADULT NURSE REASSESSMENT NOTE - NS ED NURSE REASSESS COMMENT FT1
pt found standing in ante room between the sliding doors in ambulance bay with cardiac wires still attached. pt identified, retuned to assigned space and providers made aware. pt awaiting SW for discharge, is wandering throughout ED.

## 2023-12-26 NOTE — ED PROVIDER NOTE - CLINICAL SUMMARY MEDICAL DECISION MAKING FREE TEXT BOX
38-year-old female history of asthma, DM, schizoaffective disorder coming from group home presenting to the ED for chest pain for the past day,   Nonradiating, moderate pain associated with shortness of breath.  No fevers, chills.  No nausea, vomiting.  no Abdominal pain.    Will get labs to evaluate for chest pain.  will give nonopiate meds at this time and reassess. 38-year-old female history of asthma, DM, schizoaffective disorder coming from group home presenting to the ED for chest pain for the past day,   Nonradiating, moderate pain associated with shortness of breath.  No fevers, chills.  No nausea, vomiting.  no Abdominal pain.    Will get labs to evaluate for chest pain.  will give non opiate meds at this time and reassess.

## 2023-12-26 NOTE — ED PROVIDER NOTE - PROGRESS NOTE DETAILS
Cheo James, PGY3  Patient had shaking episodes with tachycardia, not appear to be seizure-like.  1 mg Ativan IV push was given and  prior to nurse pushing Ativan, patient stopped shaking. Zvi Dubin, MD note: I took s/o for this pt at midnight. in brief, 38y F hx intellectual disability, episodes of attention-seeking behavior, obesity, freq Emergency Department visits, now p/w CP.   pt fully awake now, no shaking at all, & eating. labs reassuring. trop neg. Robaxin to be dosed. Will look to dc & coordinate with SW to get pt safely home. Zvi Dubin, MD note: Patient attempted to elope from the ED with her IV.  We were able to verbally de-escalate with assistance of NYJANIS and had her come back to the ED.  Her IV was then flushed and taken out.  Social work is working on the transport back to her group home.  One-to-one ordered.

## 2023-12-26 NOTE — ED ADULT NURSE NOTE - OBJECTIVE STATEMENT
Received pt in room 1a. A&Ox4, ambulatory at baseline, HX asthma, DM, schizoaffective disorder from group home c/o sharp intermittent nonradiating chest pain for the past day, with SOB, denies fevers, chills.  N+V, abd pain, patient has complex care, NSR on monitor, VS as noted. RR even and unlabored. 22g placed in right AC. Labs sent. Medication given. Awaiting further orders from provider.

## 2023-12-26 NOTE — ED PROVIDER NOTE - PATIENT PORTAL LINK FT
You can access the FollowMyHealth Patient Portal offered by [facility] by registering at the following website: [email or web address]. By joining [employer]’s FollowMyHealth portal, you will also be able to view your health information using other applications (apps) compatible with our system. You can access the FollowMyHealth Patient Portal offered by [facility] by registering at the following website: [email or web address]. By joining [email or web address]’s FollowMyHealth portal, you will also be able to view your health information using other applications (apps) compatible with our system.

## 2024-01-05 ENCOUNTER — EMERGENCY (EMERGENCY)
Facility: HOSPITAL | Age: 39
LOS: 1 days | Discharge: HOME | End: 2024-01-05
Attending: EMERGENCY MEDICINE
Payer: MEDICARE

## 2024-01-05 VITALS
OXYGEN SATURATION: 100 % | DIASTOLIC BLOOD PRESSURE: 87 MMHG | RESPIRATION RATE: 16 BRPM | TEMPERATURE: 97 F | HEART RATE: 66 BPM | SYSTOLIC BLOOD PRESSURE: 173 MMHG

## 2024-01-05 PROCEDURE — 99285 EMERGENCY DEPT VISIT HI MDM: CPT

## 2024-01-05 NOTE — ED ADULT TRIAGE NOTE - CHIEF COMPLAINT QUOTE
Pt reporting to the ED for subjective seizure activity. EMS reports group home staff denied seizure activity. EMs arrived pt AOX4 talking. Pt arrives to triage lethargic reporting eye pain and dizziness since yesterday at 5 pm. Pt reports not taking Depakote X 2 days. Complex care note documented. Pt denies drug, ETOH use, S/I, H/I. respirations even and unlabored, spo2 100%. FS documented.

## 2024-01-06 VITALS
RESPIRATION RATE: 18 BRPM | HEART RATE: 85 BPM | DIASTOLIC BLOOD PRESSURE: 76 MMHG | TEMPERATURE: 98 F | SYSTOLIC BLOOD PRESSURE: 106 MMHG | OXYGEN SATURATION: 97 %

## 2024-01-06 LAB
ALBUMIN SERPL ELPH-MCNC: 4 G/DL — SIGNIFICANT CHANGE UP (ref 3.3–5)
ALBUMIN SERPL ELPH-MCNC: 4 G/DL — SIGNIFICANT CHANGE UP (ref 3.3–5)
ALP SERPL-CCNC: 65 U/L — SIGNIFICANT CHANGE UP (ref 40–120)
ALP SERPL-CCNC: 65 U/L — SIGNIFICANT CHANGE UP (ref 40–120)
ALT FLD-CCNC: 51 U/L — HIGH (ref 4–33)
ALT FLD-CCNC: 51 U/L — HIGH (ref 4–33)
ANION GAP SERPL CALC-SCNC: 14 MMOL/L — SIGNIFICANT CHANGE UP (ref 7–14)
ANION GAP SERPL CALC-SCNC: 14 MMOL/L — SIGNIFICANT CHANGE UP (ref 7–14)
AST SERPL-CCNC: 36 U/L — HIGH (ref 4–32)
AST SERPL-CCNC: 36 U/L — HIGH (ref 4–32)
BASOPHILS # BLD AUTO: 0.02 K/UL — SIGNIFICANT CHANGE UP (ref 0–0.2)
BASOPHILS # BLD AUTO: 0.02 K/UL — SIGNIFICANT CHANGE UP (ref 0–0.2)
BASOPHILS NFR BLD AUTO: 0.3 % — SIGNIFICANT CHANGE UP (ref 0–2)
BASOPHILS NFR BLD AUTO: 0.3 % — SIGNIFICANT CHANGE UP (ref 0–2)
BILIRUB SERPL-MCNC: 0.3 MG/DL — SIGNIFICANT CHANGE UP (ref 0.2–1.2)
BILIRUB SERPL-MCNC: 0.3 MG/DL — SIGNIFICANT CHANGE UP (ref 0.2–1.2)
BLOOD GAS VENOUS COMPREHENSIVE RESULT: SIGNIFICANT CHANGE UP
BLOOD GAS VENOUS COMPREHENSIVE RESULT: SIGNIFICANT CHANGE UP
BUN SERPL-MCNC: 11 MG/DL — SIGNIFICANT CHANGE UP (ref 7–23)
BUN SERPL-MCNC: 11 MG/DL — SIGNIFICANT CHANGE UP (ref 7–23)
CALCIUM SERPL-MCNC: 9.6 MG/DL — SIGNIFICANT CHANGE UP (ref 8.4–10.5)
CALCIUM SERPL-MCNC: 9.6 MG/DL — SIGNIFICANT CHANGE UP (ref 8.4–10.5)
CHLORIDE SERPL-SCNC: 102 MMOL/L — SIGNIFICANT CHANGE UP (ref 98–107)
CHLORIDE SERPL-SCNC: 102 MMOL/L — SIGNIFICANT CHANGE UP (ref 98–107)
CO2 SERPL-SCNC: 22 MMOL/L — SIGNIFICANT CHANGE UP (ref 22–31)
CO2 SERPL-SCNC: 22 MMOL/L — SIGNIFICANT CHANGE UP (ref 22–31)
CREAT SERPL-MCNC: 0.75 MG/DL — SIGNIFICANT CHANGE UP (ref 0.5–1.3)
CREAT SERPL-MCNC: 0.75 MG/DL — SIGNIFICANT CHANGE UP (ref 0.5–1.3)
EGFR: 106 ML/MIN/1.73M2 — SIGNIFICANT CHANGE UP
EGFR: 106 ML/MIN/1.73M2 — SIGNIFICANT CHANGE UP
EOSINOPHIL # BLD AUTO: 0.04 K/UL — SIGNIFICANT CHANGE UP (ref 0–0.5)
EOSINOPHIL # BLD AUTO: 0.04 K/UL — SIGNIFICANT CHANGE UP (ref 0–0.5)
EOSINOPHIL NFR BLD AUTO: 0.7 % — SIGNIFICANT CHANGE UP (ref 0–6)
EOSINOPHIL NFR BLD AUTO: 0.7 % — SIGNIFICANT CHANGE UP (ref 0–6)
GLUCOSE SERPL-MCNC: 97 MG/DL — SIGNIFICANT CHANGE UP (ref 70–99)
GLUCOSE SERPL-MCNC: 97 MG/DL — SIGNIFICANT CHANGE UP (ref 70–99)
HCT VFR BLD CALC: 32.4 % — LOW (ref 34.5–45)
HCT VFR BLD CALC: 32.4 % — LOW (ref 34.5–45)
HGB BLD-MCNC: 10.2 G/DL — LOW (ref 11.5–15.5)
HGB BLD-MCNC: 10.2 G/DL — LOW (ref 11.5–15.5)
IANC: 2.53 K/UL — SIGNIFICANT CHANGE UP (ref 1.8–7.4)
IANC: 2.53 K/UL — SIGNIFICANT CHANGE UP (ref 1.8–7.4)
IMM GRANULOCYTES NFR BLD AUTO: 0.7 % — SIGNIFICANT CHANGE UP (ref 0–0.9)
IMM GRANULOCYTES NFR BLD AUTO: 0.7 % — SIGNIFICANT CHANGE UP (ref 0–0.9)
LYMPHOCYTES # BLD AUTO: 2.48 K/UL — SIGNIFICANT CHANGE UP (ref 1–3.3)
LYMPHOCYTES # BLD AUTO: 2.48 K/UL — SIGNIFICANT CHANGE UP (ref 1–3.3)
LYMPHOCYTES # BLD AUTO: 42.2 % — SIGNIFICANT CHANGE UP (ref 13–44)
LYMPHOCYTES # BLD AUTO: 42.2 % — SIGNIFICANT CHANGE UP (ref 13–44)
MAGNESIUM SERPL-MCNC: 2 MG/DL — SIGNIFICANT CHANGE UP (ref 1.6–2.6)
MAGNESIUM SERPL-MCNC: 2 MG/DL — SIGNIFICANT CHANGE UP (ref 1.6–2.6)
MCHC RBC-ENTMCNC: 29.8 PG — SIGNIFICANT CHANGE UP (ref 27–34)
MCHC RBC-ENTMCNC: 29.8 PG — SIGNIFICANT CHANGE UP (ref 27–34)
MCHC RBC-ENTMCNC: 31.5 GM/DL — LOW (ref 32–36)
MCHC RBC-ENTMCNC: 31.5 GM/DL — LOW (ref 32–36)
MCV RBC AUTO: 94.7 FL — SIGNIFICANT CHANGE UP (ref 80–100)
MCV RBC AUTO: 94.7 FL — SIGNIFICANT CHANGE UP (ref 80–100)
MONOCYTES # BLD AUTO: 0.76 K/UL — SIGNIFICANT CHANGE UP (ref 0–0.9)
MONOCYTES # BLD AUTO: 0.76 K/UL — SIGNIFICANT CHANGE UP (ref 0–0.9)
MONOCYTES NFR BLD AUTO: 12.9 % — SIGNIFICANT CHANGE UP (ref 2–14)
MONOCYTES NFR BLD AUTO: 12.9 % — SIGNIFICANT CHANGE UP (ref 2–14)
NEUTROPHILS # BLD AUTO: 2.53 K/UL — SIGNIFICANT CHANGE UP (ref 1.8–7.4)
NEUTROPHILS # BLD AUTO: 2.53 K/UL — SIGNIFICANT CHANGE UP (ref 1.8–7.4)
NEUTROPHILS NFR BLD AUTO: 43.2 % — SIGNIFICANT CHANGE UP (ref 43–77)
NEUTROPHILS NFR BLD AUTO: 43.2 % — SIGNIFICANT CHANGE UP (ref 43–77)
NRBC # BLD: 0 /100 WBCS — SIGNIFICANT CHANGE UP (ref 0–0)
NRBC # BLD: 0 /100 WBCS — SIGNIFICANT CHANGE UP (ref 0–0)
NRBC # FLD: 0 K/UL — SIGNIFICANT CHANGE UP (ref 0–0)
NRBC # FLD: 0 K/UL — SIGNIFICANT CHANGE UP (ref 0–0)
PHOSPHATE SERPL-MCNC: 4.3 MG/DL — SIGNIFICANT CHANGE UP (ref 2.5–4.5)
PHOSPHATE SERPL-MCNC: 4.3 MG/DL — SIGNIFICANT CHANGE UP (ref 2.5–4.5)
PLATELET # BLD AUTO: 142 K/UL — LOW (ref 150–400)
PLATELET # BLD AUTO: 142 K/UL — LOW (ref 150–400)
POTASSIUM SERPL-MCNC: 4.2 MMOL/L — SIGNIFICANT CHANGE UP (ref 3.5–5.3)
POTASSIUM SERPL-MCNC: 4.2 MMOL/L — SIGNIFICANT CHANGE UP (ref 3.5–5.3)
POTASSIUM SERPL-SCNC: 4.2 MMOL/L — SIGNIFICANT CHANGE UP (ref 3.5–5.3)
POTASSIUM SERPL-SCNC: 4.2 MMOL/L — SIGNIFICANT CHANGE UP (ref 3.5–5.3)
PROT SERPL-MCNC: 7.4 G/DL — SIGNIFICANT CHANGE UP (ref 6–8.3)
PROT SERPL-MCNC: 7.4 G/DL — SIGNIFICANT CHANGE UP (ref 6–8.3)
RBC # BLD: 3.42 M/UL — LOW (ref 3.8–5.2)
RBC # BLD: 3.42 M/UL — LOW (ref 3.8–5.2)
RBC # FLD: 16.6 % — HIGH (ref 10.3–14.5)
RBC # FLD: 16.6 % — HIGH (ref 10.3–14.5)
SODIUM SERPL-SCNC: 138 MMOL/L — SIGNIFICANT CHANGE UP (ref 135–145)
SODIUM SERPL-SCNC: 138 MMOL/L — SIGNIFICANT CHANGE UP (ref 135–145)
WBC # BLD: 5.87 K/UL — SIGNIFICANT CHANGE UP (ref 3.8–10.5)
WBC # BLD: 5.87 K/UL — SIGNIFICANT CHANGE UP (ref 3.8–10.5)
WBC # FLD AUTO: 5.87 K/UL — SIGNIFICANT CHANGE UP (ref 3.8–10.5)
WBC # FLD AUTO: 5.87 K/UL — SIGNIFICANT CHANGE UP (ref 3.8–10.5)

## 2024-01-06 RX ORDER — VALPROIC ACID (AS SODIUM SALT) 250 MG/5ML
1500 SOLUTION, ORAL ORAL ONCE
Refills: 0 | Status: COMPLETED | OUTPATIENT
Start: 2024-01-06 | End: 2024-01-06

## 2024-01-06 RX ADMIN — Medication 52 MILLIGRAM(S): at 03:47

## 2024-01-06 NOTE — ED PROVIDER NOTE - PHYSICAL EXAMINATION
GENERAL: NAD  HEAD: normocephalic, atraumatic  HEENT: normal conjunctiva, oral mucosa moist, uvula midline  CARDIAC: regular rate and rhythm, normal S1S2, no appreciable murmurs  PULM: speaking in full sentences, normal breath sounds  GI: abdomen nondistended, soft, nontender  : no suprapubic tenderness  NEURO: moving all 4 extremities, no focal deficits, normal speech, AOx3  MSK: no peripheral edema, no calf tenderness b/l  SKIN: extremities warm
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
## 2024-01-06 NOTE — ED PROVIDER NOTE - PATIENT PORTAL LINK FT
You can access the FollowMyHealth Patient Portal offered by [facility] by registering at the following website: [email or web address]. By joining [employer]’s FollowMyHealth portal, you will also be able to view your health information using other applications (apps) compatible with our system. You can access the FollowMyHealth Patient Portal offered by [facility] by registering at the following website: [email or web address]. By joining [employer]’s FollowMyHealth portal, you will also be able to view your health information using other applications (apps) compatible with our system.
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
## 2024-01-06 NOTE — ED PROVIDER NOTE - CLINICAL SUMMARY MEDICAL DECISION MAKING FREE TEXT BOX
38-year-old female with history of asthma, bipolar disorder, DM, endometriosis, factor V Leiden GERD, HTN, epilepsy presents with concern for a seizure earlier today. Labs, Depakote ordered.  Given patient missed her medications most likely explanation of possible seizure activity.  Will continue to monitor.

## 2024-01-06 NOTE — ED ADULT NURSE NOTE - OBJECTIVE STATEMENT
Float RN-presents c/o seizures. as per pt has been non compliant with Depakote. pt endorsing headaches. No complaints of chest pain, nausea, dizziness, vomiting  SOB, fever, chills verbalized. ABD is soft, non tender, non distended. Respirations even and unlabored with equal chest rise bilaterally. phx HTN seizure. bipolar DO. DM2.
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
## 2024-01-06 NOTE — ED ADULT NURSE NOTE - NSFALLHARMRISKINTERV_ED_ALL_ED
Communicate risk of Fall with Harm to all staff, patient, and family/Provide visual cue: red socks, yellow wristband, yellow gown, etc/Reinforce activity limits and safety measures with patient and family/Bed in lowest position, wheels locked, appropriate side rails in place/Call bell, personal items and telephone in reach/Instruct patient to call for assistance before getting out of bed/chair/stretcher/Non-slip footwear applied when patient is off stretcher/[place] to call system/Physically safe environment - no spills, clutter or unnecessary equipment/Purposeful Proactive Rounding/Room/bathroom lighting operational, light cord in reach Communicate risk of Fall with Harm to all staff, patient, and family/Provide visual cue: red socks, yellow wristband, yellow gown, etc/Reinforce activity limits and safety measures with patient and family/Bed in lowest position, wheels locked, appropriate side rails in place/Call bell, personal items and telephone in reach/Instruct patient to call for assistance before getting out of bed/chair/stretcher/Non-slip footwear applied when patient is off stretcher/[place] to call system/Physically safe environment - no spills, clutter or unnecessary equipment/Purposeful Proactive Rounding/Room/bathroom lighting operational, light cord in reach

## 2024-01-06 NOTE — ED ADULT NURSE REASSESSMENT NOTE - NS ED NURSE REASSESS COMMENT FT1
ER pt 's report taking from night shift, pt AOx3 stared while she was at [facility]. did nota rec'd her Seizure meds. she went home and did not take meds, pt stated a total of 3 doses missed.  pt denies HA, dizziness, no N/V at present time, pt D/C home SW contacted.        Christiane Connelly RN ER pt 's report taking from night shift, pt AOx3 stared while she was at [facility]. did nota rec'd her Seizure meds. she went home and did not take meds, pt stated a total of 3 doses missed.  pt denies HA, dizziness, no N/V at present time, pt D/C home SW contacted.        Christiane Connelly RN

## 2024-01-06 NOTE — ED ADULT NURSE NOTE - ED STAT RN HANDOFF DETAILS
Report received from Godwin Grande RN. patient in [facility]. no acute or respiratory distress noted. patient awaiting . Report received from Godwin Grande RN. patient in [facility]. no acute or respiratory distress noted. patient awaiting .

## 2024-01-06 NOTE — PROVIDER CONTACT NOTE (OTHER) - ASSESSMENT
Medical team referred this case as pt has been medically cleared for discharged back to her group [place]. Writer spoke with patient's group home  staff member Ms. Herman , DSP  P# [phone]. MS. Herman  DSP confirmed  Address and pt's mode of transportation is Ambulance.  [facility] located at [address] [place] [address]. Writer arranged MAS via online portal   and invoice  # [id number], writer contacted [facility]  [phone]  and spoke with Barbara ritchie # [id number] and ETA- 2 :30 pm .  Non Emergent ambulance form has been filled out by MD and has been attached to the pt's envelope for EMS.  Medical team and pt was informed about this ETA and in agreement with this plan. verbal Huddle completed. No further social work intervention is needed for this visit. Medical team referred this case as pt has been medically cleared for discharged back to her group [place]. Writer spoke with patient's group home  staff member Ms. Herman , DSP  P# [phone]. MS. Herman  DSP confirmed  Address and pt's mode of transportation is Ambulance.  [facility] [employer] [facility] located at [address] [place] [address]. Writer arranged MAS via online portal   and invoice  # [id number], writer contacted [facility]  [phone]  and spoke with Barbara ritchie # [id number] and ETA- 2 :30 pm .  Non Emergent ambulance form has been filled out by MD and has been attached to the pt's envelope for EMS.  Medical team and pt was informed about this ETA and in agreement with this plan. verbal Huddle completed. No further social work intervention is needed for this visit.

## 2024-01-06 NOTE — ED PROVIDER NOTE - NSFOLLOWUPINSTRUCTIONS_ED_ALL_ED_FT
Seizure    A seizure is abnormal electrical activity in the brain; the specific cause may or may not be found. Prior to a seizure you may experience a warning sensation (aura) that may include fear, nausea, dizziness, and visual changes such as flashing lights of spots. Common symptoms during the seizure may include an altered mental status, rhythmic jerking movements, drooling, grunting, loss of bladder or bowel control, or tongue biting. After a seizure, you may feel confused and sleepy.     Do not swim, drive, operate machinery, or engage in any risky activity during which a seizure could cause further injury to you or others. Teach friends and family what to do if you HAVE a seizure which includes laying you on the ground with your head on a cushion and turning you to the side to keep your breathing passages clear in case of vomiting.    SEEK IMMEDIATE MEDICAL CARE IF YOU HAVE ANY OF THE FOLLOWING SYMPTOMS: seizure lasting over 5 minutes, not waking up or persistent altered mental status after the seizure, or more frequent or worsening seizures. You were evaluated in the emergency department for concern of seizure symptoms. Your results were discussed with you and are attached. See your primary doctor within 1 week and follow up with your neurologist.     Seizure    A seizure is abnormal electrical activity in the brain; the specific cause may or may not be found. Prior to a seizure you may experience a warning sensation (aura) that may include fear, nausea, dizziness, and visual changes such as flashing lights of spots. Common symptoms during the seizure may include an altered mental status, rhythmic jerking movements, drooling, grunting, loss of bladder or bowel control, or tongue biting. After a seizure, you may feel confused and sleepy.     Do not swim, drive, operate machinery, or engage in any risky activity during which a seizure could cause further injury to you or others. Teach friends and family what to do if you HAVE a seizure which includes laying you on the ground with your head on a cushion and turning you to the side to keep your breathing passages clear in case of vomiting.    SEEK IMMEDIATE MEDICAL CARE IF YOU HAVE ANY OF THE FOLLOWING SYMPTOMS: seizure lasting over 5 minutes, not waking up or persistent altered mental status after the seizure, or more frequent or worsening seizures.

## 2024-01-31 NOTE — ED ADULT NURSE NOTE - CHIEF COMPLAINT
Continue with current dialysis Tuesday Thursday Saturday   
The patient is a 37y Female complaining of abdominal pain.

## 2024-02-01 NOTE — DISCHARGE NOTE NURSING/CASE MANAGEMENT/SOCIAL WORK - PATIENT PORTAL LINK FT
Please Approve or Refuse.  Send to Pharmacy per Pt's Request:      Next Visit Date:  5/2/2024   Last Visit Date: 11/2/2023    Hemoglobin A1C (%)   Date Value   05/05/2023 5.5   01/10/2022 5.4   08/06/2021 5.3             ( goal A1C is < 7)   BP Readings from Last 3 Encounters:   11/02/23 124/74   07/06/23 119/80   06/29/23 129/84          (goal 120/80)  BUN   Date Value Ref Range Status   05/05/2023 17 8 - 23 mg/dL Final     Creatinine   Date Value Ref Range Status   05/05/2023 0.89 0.70 - 1.20 mg/dL Final     Potassium   Date Value Ref Range Status   05/05/2023 4.2 3.7 - 5.3 mmol/L Final            You can access the FollowMyHealth Patient Portal offered by [facility] by registering at the following website: [email or web address]. By joining [employer]’s FollowMyHealth portal, you will also be able to view your health information using other applications (apps) compatible with our system.

## 2024-02-12 NOTE — ED BEHAVIORAL HEALTH ASSESSMENT NOTE - NS ED BHA DEMOGRAPHICS RACE
----- Message from JEAN CARLOS Cash sent at 2/9/2024  2:59 PM CST -----  Regarding: mirena  please      / Black

## 2024-02-16 ENCOUNTER — EMERGENCY (EMERGENCY)
Facility: HOSPITAL | Age: 39
LOS: 1 days | Discharge: HOME | End: 2024-02-16
Attending: STUDENT IN AN ORGANIZED HEALTH CARE EDUCATION/TRAINING PROGRAM | Admitting: STUDENT IN AN ORGANIZED HEALTH CARE EDUCATION/TRAINING PROGRAM
Payer: MEDICARE

## 2024-02-16 VITALS
HEART RATE: 91 BPM | OXYGEN SATURATION: 99 % | DIASTOLIC BLOOD PRESSURE: 67 MMHG | TEMPERATURE: 98 F | SYSTOLIC BLOOD PRESSURE: 135 MMHG | RESPIRATION RATE: 16 BRPM

## 2024-02-16 LAB
ALBUMIN SERPL ELPH-MCNC: 3.9 G/DL — SIGNIFICANT CHANGE UP (ref 3.3–5)
ALP SERPL-CCNC: 73 U/L — SIGNIFICANT CHANGE UP (ref 40–120)
ALT FLD-CCNC: 39 U/L — HIGH (ref 4–33)
ANION GAP SERPL CALC-SCNC: 13 MMOL/L — SIGNIFICANT CHANGE UP (ref 7–14)
ANISOCYTOSIS BLD QL: SLIGHT — SIGNIFICANT CHANGE UP
APPEARANCE UR: ABNORMAL
AST SERPL-CCNC: 22 U/L — SIGNIFICANT CHANGE UP (ref 4–32)
BACTERIA # UR AUTO: ABNORMAL /HPF
BASE EXCESS BLDV CALC-SCNC: -2.1 MMOL/L — LOW (ref -2–3)
BASOPHILS # BLD AUTO: 0.05 K/UL — SIGNIFICANT CHANGE UP (ref 0–0.2)
BASOPHILS NFR BLD AUTO: 1 % — SIGNIFICANT CHANGE UP (ref 0–2)
BILIRUB SERPL-MCNC: <0.2 MG/DL — SIGNIFICANT CHANGE UP (ref 0.2–1.2)
BILIRUB UR-MCNC: NEGATIVE — SIGNIFICANT CHANGE UP
BUN SERPL-MCNC: 14 MG/DL — SIGNIFICANT CHANGE UP (ref 7–23)
CA-I SERPL-SCNC: 1.25 MMOL/L — SIGNIFICANT CHANGE UP (ref 1.15–1.33)
CALCIUM SERPL-MCNC: 9.2 MG/DL — SIGNIFICANT CHANGE UP (ref 8.4–10.5)
CAST: 1 /LPF — SIGNIFICANT CHANGE UP (ref 0–4)
CHLORIDE BLDV-SCNC: 108 MMOL/L — SIGNIFICANT CHANGE UP (ref 96–108)
CHLORIDE SERPL-SCNC: 107 MMOL/L — SIGNIFICANT CHANGE UP (ref 98–107)
CO2 BLDV-SCNC: 25.1 MMOL/L — SIGNIFICANT CHANGE UP (ref 22–26)
CO2 SERPL-SCNC: 19 MMOL/L — LOW (ref 22–31)
COLOR SPEC: YELLOW — SIGNIFICANT CHANGE UP
CREAT SERPL-MCNC: 0.83 MG/DL — SIGNIFICANT CHANGE UP (ref 0.5–1.3)
DACRYOCYTES BLD QL SMEAR: SLIGHT — SIGNIFICANT CHANGE UP
DIFF PNL FLD: NEGATIVE — SIGNIFICANT CHANGE UP
EGFR: 94 ML/MIN/1.73M2 — SIGNIFICANT CHANGE UP
ELLIPTOCYTES BLD QL SMEAR: SLIGHT — SIGNIFICANT CHANGE UP
EOSINOPHIL # BLD AUTO: 0 K/UL — SIGNIFICANT CHANGE UP (ref 0–0.5)
EOSINOPHIL NFR BLD AUTO: 0 % — SIGNIFICANT CHANGE UP (ref 0–6)
GAS PNL BLDV: 138 MMOL/L — SIGNIFICANT CHANGE UP (ref 136–145)
GAS PNL BLDV: SIGNIFICANT CHANGE UP
GLUCOSE BLDV-MCNC: 87 MG/DL — SIGNIFICANT CHANGE UP (ref 70–99)
GLUCOSE SERPL-MCNC: 87 MG/DL — SIGNIFICANT CHANGE UP (ref 70–99)
GLUCOSE UR QL: NEGATIVE MG/DL — SIGNIFICANT CHANGE UP
HCO3 BLDV-SCNC: 24 MMOL/L — SIGNIFICANT CHANGE UP (ref 22–29)
HCT VFR BLD CALC: 34.6 % — SIGNIFICANT CHANGE UP (ref 34.5–45)
HCT VFR BLDA CALC: 31 % — LOW (ref 34.5–46.5)
HGB BLD CALC-MCNC: 10.2 G/DL — LOW (ref 11.7–16.1)
HGB BLD-MCNC: 11 G/DL — LOW (ref 11.5–15.5)
IANC: 2.5 K/UL — SIGNIFICANT CHANGE UP (ref 1.8–7.4)
KETONES UR-MCNC: ABNORMAL MG/DL
LACTATE BLDV-MCNC: 2.3 MMOL/L — HIGH (ref 0.5–2)
LEUKOCYTE ESTERASE UR-ACNC: NEGATIVE — SIGNIFICANT CHANGE UP
LIDOCAIN IGE QN: 34 U/L — SIGNIFICANT CHANGE UP (ref 7–60)
LYMPHOCYTES # BLD AUTO: 2.13 K/UL — SIGNIFICANT CHANGE UP (ref 1–3.3)
LYMPHOCYTES # BLD AUTO: 40 % — SIGNIFICANT CHANGE UP (ref 13–44)
MACROCYTES BLD QL: SLIGHT — SIGNIFICANT CHANGE UP
MANUAL SMEAR VERIFICATION: SIGNIFICANT CHANGE UP
MCHC RBC-ENTMCNC: 29.9 PG — SIGNIFICANT CHANGE UP (ref 27–34)
MCHC RBC-ENTMCNC: 31.8 GM/DL — LOW (ref 32–36)
MCV RBC AUTO: 94 FL — SIGNIFICANT CHANGE UP (ref 80–100)
MONOCYTES # BLD AUTO: 0.37 K/UL — SIGNIFICANT CHANGE UP (ref 0–0.9)
MONOCYTES NFR BLD AUTO: 7 % — SIGNIFICANT CHANGE UP (ref 2–14)
NEUTROPHILS # BLD AUTO: 2.67 K/UL — SIGNIFICANT CHANGE UP (ref 1.8–7.4)
NEUTROPHILS NFR BLD AUTO: 50 % — SIGNIFICANT CHANGE UP (ref 43–77)
NITRITE UR-MCNC: NEGATIVE — SIGNIFICANT CHANGE UP
NRBC # BLD: 0 /100 WBCS — SIGNIFICANT CHANGE UP (ref 0–0)
PCO2 BLDV: 44 MMHG — SIGNIFICANT CHANGE UP (ref 39–52)
PH BLDV: 7.34 — SIGNIFICANT CHANGE UP (ref 7.32–7.43)
PH UR: 6 — SIGNIFICANT CHANGE UP (ref 5–8)
PLAT MORPH BLD: ABNORMAL
PLATELET # BLD AUTO: 56 K/UL — LOW (ref 150–400)
PLATELET CLUMP BLD QL SMEAR: SLIGHT
PLATELET COUNT - ESTIMATE: ABNORMAL
PO2 BLDV: 66 MMHG — HIGH (ref 25–45)
POLYCHROMASIA BLD QL SMEAR: SLIGHT — SIGNIFICANT CHANGE UP
POTASSIUM BLDV-SCNC: 4.6 MMOL/L — SIGNIFICANT CHANGE UP (ref 3.5–5.1)
POTASSIUM SERPL-MCNC: 5 MMOL/L — SIGNIFICANT CHANGE UP (ref 3.5–5.3)
POTASSIUM SERPL-SCNC: 5 MMOL/L — SIGNIFICANT CHANGE UP (ref 3.5–5.3)
PROT SERPL-MCNC: 7.4 G/DL — SIGNIFICANT CHANGE UP (ref 6–8.3)
PROT UR-MCNC: SIGNIFICANT CHANGE UP MG/DL
RBC # BLD: 3.68 M/UL — LOW (ref 3.8–5.2)
RBC # FLD: 16.4 % — HIGH (ref 10.3–14.5)
RBC BLD AUTO: ABNORMAL
RBC CASTS # UR COMP ASSIST: 2 /HPF — SIGNIFICANT CHANGE UP (ref 0–4)
SAO2 % BLDV: 93.1 % — HIGH (ref 67–88)
SCHISTOCYTES BLD QL AUTO: SLIGHT — SIGNIFICANT CHANGE UP
SODIUM SERPL-SCNC: 139 MMOL/L — SIGNIFICANT CHANGE UP (ref 135–145)
SP GR SPEC: 1.03 — HIGH (ref 1–1.03)
SQUAMOUS # UR AUTO: 21 /HPF — HIGH (ref 0–5)
UROBILINOGEN FLD QL: 1 MG/DL — SIGNIFICANT CHANGE UP (ref 0.2–1)
VARIANT LYMPHS # BLD: 2 % — SIGNIFICANT CHANGE UP (ref 0–6)
WBC # BLD: 5.33 K/UL — SIGNIFICANT CHANGE UP (ref 3.8–10.5)
WBC # FLD AUTO: 5.33 K/UL — SIGNIFICANT CHANGE UP (ref 3.8–10.5)
WBC UR QL: 3 /HPF — SIGNIFICANT CHANGE UP (ref 0–5)

## 2024-02-16 PROCEDURE — 99285 EMERGENCY DEPT VISIT HI MDM: CPT

## 2024-02-16 PROCEDURE — 74176 CT ABD & PELVIS W/O CONTRAST: CPT | Mod: 26,MA

## 2024-02-16 RX ORDER — ACETAMINOPHEN 500 MG
650 TABLET ORAL ONCE
Refills: 0 | Status: COMPLETED | OUTPATIENT
Start: 2024-02-16 | End: 2024-02-16

## 2024-02-16 RX ORDER — MORPHINE SULFATE 50 MG/1
2 CAPSULE, EXTENDED RELEASE ORAL ONCE
Refills: 0 | Status: DISCONTINUED | OUTPATIENT
Start: 2024-02-16 | End: 2024-02-16

## 2024-02-16 RX ORDER — SODIUM CHLORIDE 9 MG/ML
1000 INJECTION INTRAMUSCULAR; INTRAVENOUS; SUBCUTANEOUS ONCE
Refills: 0 | Status: COMPLETED | OUTPATIENT
Start: 2024-02-16 | End: 2024-02-16

## 2024-02-16 RX ADMIN — Medication 650 MILLIGRAM(S): at 17:31

## 2024-02-16 RX ADMIN — Medication 650 MILLIGRAM(S): at 18:01

## 2024-02-16 RX ADMIN — MORPHINE SULFATE 2 MILLIGRAM(S): 50 CAPSULE, EXTENDED RELEASE ORAL at 19:48

## 2024-02-16 RX ADMIN — MORPHINE SULFATE 2 MILLIGRAM(S): 50 CAPSULE, EXTENDED RELEASE ORAL at 19:18

## 2024-02-16 RX ADMIN — SODIUM CHLORIDE 1000 MILLILITER(S): 9 INJECTION INTRAMUSCULAR; INTRAVENOUS; SUBCUTANEOUS at 19:18

## 2024-02-16 NOTE — PROVIDER CONTACT NOTE (OTHER) - BACKGROUND
Pt from [facility] Group Home located at [address] [place], [place]., phone #[phone]. Writer contacted pt's group home and spoke with staff member, Libra Herman, who was

## 2024-02-16 NOTE — ED ADULT NURSE REASSESSMENT NOTE - NS ED NURSE REASSESS COMMENT FT1
Patient returned from CT scan, USIV is not working and patient needs another new USIV. Patient is asking for more pain medication, Dr. Hart made aware. Will wait for further orders.

## 2024-02-16 NOTE — ED PROVIDER NOTE - PROGRESS NOTE DETAILS
results are discussed with the patient. Pt continues to defer rectal exam. No bowel movements while being in the ed. Strict return precautions are discussed. States she already has a GI physician and she will call the doc on Monday.  SW is consulted for transportation back to the [facility]

## 2024-02-16 NOTE — ED ADULT TRIAGE NOTE - CHIEF COMPLAINT QUOTE
Pt presents from group home for blood in stool. Pt is on Eliquis related to DVT, pt endorses pain to Lt side of abdomen starting today, denies n/v/d, afebrile.

## 2024-02-16 NOTE — ED PROVIDER NOTE - NSFOLLOWUPINSTRUCTIONS_ED_ALL_ED_FT
You were seen today for abdominal pain and blood streak in your stools.  You did want your provider to do a rectal exam while being in the ED.  Your testing in the emergency department is non actionable at this time.     Please call your gastroenterologist to be further assessed as soon as possible.    If you start developing fever, chills, abdominal pain, nausea, vomiting, increased rectal bleeding or any other concerning symptoms please seek medical assistance immediately.    You can use 500-1000mg Tylenol every 6 hours for pain - as needed.  This is an over-the-counter medications - please respect the warnings on the label. This medication come with certain risks and side effects that you need to discuss with your doctor, especially if you are taking it for a prolonged period.

## 2024-02-16 NOTE — PROVIDER CONTACT NOTE (OTHER) - SITUATION
Writer informed by MD that pt is cleared for discharge. Pt arrives from group home. Pt chart reviewed.

## 2024-02-16 NOTE — PROVIDER CONTACT NOTE (OTHER) - ASSESSMENT
informed of pt's discharge. MS. Marycarmen Herman aware of pt returning and confirmed pt to travel via AMBULANCE with SUPERVISION. Group home address confirmed. Trip arranged with [facility] #[phone]. informed of pt's discharge. MS. Marycarmen Herman aware of pt returning and confirmed pt to travel via AMBULANCE with SUPERVISION. Group home address confirmed. Trip arranged with [facility] #[phone]. TRIP #[id number] informed of pt's discharge. MS. Marycarmen Herman aware of pt returning and confirmed pt to travel via AMBULANCE with SUPERVISION. Group home address confirmed. Trip arranged with [facility] #[phone]. TRIP #[id number] for next available . Yes

## 2024-02-16 NOTE — ED ADULT NURSE NOTE - OBJECTIVE STATEMENT
Received patient in Intake 8 c/o LLQ abdominal pain, blood in stool, patient denies SOB, chest pain, fever. Patient is A&OX4, airway patent, breathing unlabored and even, radial pulses palpable, lungs clear, abdomen soft, tender. Labs obtained, medications given as ordered. Patient needs USIV for CT scan, Dr. Hart made aware. Side rails up and safety maintained. Fall precaution in place. Call bells within reach.

## 2024-02-16 NOTE — ED PROVIDER NOTE - CLINICAL SUMMARY MEDICAL DECISION MAKING FREE TEXT BOX
38-year-old female with extensive past medical history coming in with right lower quadrant pain and blood in the stool.  Patient reports she saw a streak of blood in the stool.  No blood in the bowl.  Patient denies rectal pain.  No fevers, chills, nausea, vomiting.  Patient is on Eliquis.  Denies dizziness, headache, chest pain, shortness of breath, pain or swelling in lower extremities.  Patient is nontoxic-appearing.  No distress.  Mild tenderness to palpation in left lower quadrant.  Patient defers rectal exam at this time.  Differential diagnoses include but not limited to diverticulitis, colitis, internal hemorrhoid given patient does not have rectal pain.    H&H is stable.  Vital signs are stable. CT results are reviewed and discussed with the pt . Pt doesn't have any urinary complains. Fu with GI for continue care. 38-year-old female with extensive past medical history coming in with right lower quadrant pain and blood in the stool.  Patient reports she saw a streak of blood in the stool this morning and in the afternoon.  No blood in the bowl.  Patient denies rectal pain.  No fevers, chills, nausea, vomiting.  Patient is on Eliquis.  Denies dizziness, headache, chest pain, shortness of breath, pain or swelling in lower extremities.  Patient is nontoxic-appearing.  No distress.  Mild tenderness to palpation in left lower quadrant.  Patient defers rectal exam at this time.  Differential diagnoses include but not limited to diverticulitis, colitis, internal hemorrhoid given patient does not have rectal pain.  Complex care note is reviewed. Pmhx is reviewed. Pt is calm and cooperative while being in the ed. Answering all ques appropriately.    H&H is stable.  Vital signs are stable. CT results are reviewed and discussed with the pt . Pt doesn't have any urinary complains. Fu with GI for continue care.

## 2024-02-16 NOTE — ED PROVIDER NOTE - PATIENT PORTAL LINK FT
You can access the FollowMyHealth Patient Portal offered by [facility] by registering at the following website: [email or web address]. By joining [employer]’s FollowMyHealth portal, you will also be able to view your health information using other applications (apps) compatible with our system.

## 2024-02-17 VITALS
RESPIRATION RATE: 16 BRPM | OXYGEN SATURATION: 100 % | TEMPERATURE: 98 F | SYSTOLIC BLOOD PRESSURE: 105 MMHG | HEART RATE: 90 BPM | DIASTOLIC BLOOD PRESSURE: 72 MMHG

## 2024-02-18 LAB
CULTURE RESULTS: SIGNIFICANT CHANGE UP
SPECIMEN SOURCE: SIGNIFICANT CHANGE UP

## 2024-02-20 NOTE — ED ADULT NURSE NOTE - NSFALLHARMRISKINTERV_ED_ALL_ED

## 2024-02-20 NOTE — ED PROVIDER NOTE - NSCAREINITIATED _GEN_ER
Patient ID: [id number]     Chief Complaint: No chief complaint on file.      HPI:     This is a telemedicine note. Patient was treated using telemedicine, real time audio and video, according to [facility] protocols. I, Sonya Vela MD, conducted the visit from the [facility]. The patient participated in the visit at a non-[facility] location selected by the patient, identified below. I am licensed in the state where the patient stated they are located. The patient stated that they understood and accepted the privacy and security risks to their information at their location. This visit is not recorded.    Patient was located at the patient's home.       Rosetta Loving is a 71 y.o. female here today for a telemedicine visit.     Hypertension  Lisinopril/hydrochlorothiazide  Currently asymptomatic   Would like to get a refill     Depression  Currently on venlafaxine  PHQ-9 score today is 13   Reports it to be ineffective and would like to switch medications   Has tried Lexapro, Zoloft and Effexor in past     Obesity  DM   Hemoglobin A1c   Date Value Ref Range Status   09/25/2023 6.7 <=7.0 % Final   06/04/2021 6.2 <<=7.0 % Final   01/14/2021 6.1 <<=7.0 % Final   Body mass index is 36.93 kg/m².  Currently on Ozempic 1 mg   Has Nausea and acid reflux  Would like to go up on it   Denies of any history/family history of thyroid, parathyroid, pancreatic, MEN in tumor    ----------------------------  Diabetes mellitus, type 2  Hypertension     Past Surgical History:   Procedure Laterality Date    APPENDECTOMY      Arm repair Left 10/09/2023    CHOLECYSTECTOMY  1994    Yes    COLONOSCOPY  1954    Dr. Elie Pickett    GALLBLADDER SURGERY      HIP SURGERY      HYSTERECTOMY      KNEE SURGERY      TONSILLECTOMY         Review of patient's allergies indicates:  No Known Allergies    Current Outpatient Medications   Medication Instructions    busPIRone (BUSPAR) 10 mg, Oral, 3 times daily    diclofenac  sodium (SOLARAZE) 3 % gel 1 application , Topical (Top), 2 times daily    lisinopriL-hydrochlorothiazide (PRINZIDE,ZESTORETIC) 20-12.5 mg per tablet 1 tablet, Oral, Daily    metFORMIN (GLUCOPHAGE-XR) 500 mg, Oral, 2 times daily    pravastatin (PRAVACHOL) 20 mg, Oral, Daily    semaglutide (OZEMPIC) 1 mg, Subcutaneous, Every 7 days    venlafaxine (EFFEXOR-XR) 75 mg, Oral, Daily       Social History     Socioeconomic History    Marital status:    Tobacco Use    Smoking status: Never     Passive exposure: Never    Smokeless tobacco: Never    Tobacco comments:     Never   Substance and Sexual Activity    Alcohol use: Yes     Alcohol/week: 1.0 standard drink of alcohol     Types: 1 Glasses of wine per week     Comment: Social    Drug use: Never    Sexual activity: Not Currently     Partners: Male     Birth control/protection: Abstinence     Comment: Abstinence     Social Determinants of Health     Financial Resource Strain: High Risk (2/17/2024)    Overall Financial Resource Strain (CARDIA)     Difficulty of Paying Living Expenses: Hard   Food Insecurity: Food Insecurity Present (2/17/2024)    Hunger Vital Sign     Worried About Running Out of Food in the Last Year: Sometimes true     Ran Out of Food in the Last Year: Sometimes true   Transportation Needs: No Transportation Needs (2/17/2024)    PRAPARE - Transportation     Lack of Transportation (Medical): No     Lack of Transportation (Non-Medical): No   Physical Activity: Insufficiently Active (2/17/2024)    Exercise Vital Sign     Days of Exercise per Week: 2 days     Minutes of Exercise per Session: 30 min   Stress: Stress Concern Present (2/17/2024)    [employer] - Occupational Stress Questionnaire     Feeling of Stress : Rather much   Social Connections: Unknown (2/17/2024)    Social Connection and Isolation Panel [NHANES]     Frequency of Communication with Friends and Family: Once a week     Frequency of Social Gatherings with  Friends and Family: Once a week     Active Member of Clubs or Organizations: No     Attends Club or Organization Meetings: Never     Marital Status:    Housing Stability: Low Risk  (2/17/2024)    Housing Stability Vital Sign     Unable to Pay for Housing in the Last Year: No     Number of Places Lived in the Last Year: 1     Unstable Housing in the Last Year: No        Family History   Problem Relation Age of Onset    Arthritis Mother         Yes    Cancer Mother         Breast    Depression Mother         Yes    Hypertension Mother         Yes    Mental illness Mother         Yes    Diabetes Father         Yes        Patient Care Team:  Sonya Vela MD as PCP - General (Family Medicine)      Subjective:       ROS    See HPI for details    Constitutional: Denies Change in appetite. Denies Chills. Denies Fever. Denies Night sweats.  Eye: Denies Blurred vision. Denies Discharge. Denies Eye pain.  ENT: Denies Decreased hearing. Denies Sore throat. Denies Swollen glands.  Respiratory: Denies Cough. Denies Shortness of breath. Denies Shortness of breath with exertion. Denies Wheezing.  Cardiovascular: DeniesChest pain at rest. Denies Chest pain with exertion. Denies Irregular heartbeat. Denies Palpitations. Denies Edema.  Gastrointestinal: Denies Abdominal pain. DeniesDiarrhea. Denies Nausea. Denies Vomiting. Denies Hematemesis or Hematochezia.  Genitourinary: Denies Dysuria. Denies Urinary frequency. Denies Urinary urgency. Denies Blood in urine.  Endocrine: Denies Cold intolerance. Denies Excessive thirst. Denies Heat intolerance. Denies Weight loss. Denies Weight gain.  Musculoskeletal: Denies Painful joints. Denies Weakness.  Integumentary: Denies Rash. Denies Itching. Denies Dry skin.  Neurologic: Denies Dizziness. Denies Fainting. Denies Headache.  Psychiatric: Denies Depression. Denies Anxiety. Denies Suicidal/Homicidal ideations.    All Other ROS: Negative except as stated in HPI.       Objective:  "    Visit Vitals  Ht 5' 6" (1.676 m)   Wt 102.1 kg (225 lb)   BMI 36.32 kg/m²       Physical Exam    Physical Exam: LIMITED DUE TO TELEMEDICINE RESTRICTIONS.  General: Alert and oriented, obese, No acute distress.  Head: Normocephalic, Atraumatic.  Eye: Sclera non-icteric.  Neck/Thyroid:  Full range of motion.  Respiratory: Non-labored respirations, Symmetrical chest wall expansion.  Musculoskeletal: Normal range of motion.  Integumentary:  No visible suspicious lesions or rashes. No diaphoresis.   Neurologic: No focal deficits  Psychiatric: Normal interaction, Coherent speech, Euthymic mood, Appropriate affect       Assessment:       ICD-10-CM ICD-9-CM   1. Type 2 diabetes mellitus with hyperglycemia, without long-term current use of insulin  E11.65 250.00     790.29   2. Nausea  R11.0 787.02   3. Gastroesophageal reflux disease without esophagitis  K21.9 530.81   4. Class 2 severe obesity due to excess calories with serious comorbidity and body mass index (BMI) of 36.0 to 36.9 in adult  E66.01 278.01    Z68.36 V85.36   5. Depression, recurrent  F33.9 296.30        Plan:     1. Type 2 diabetes mellitus with hyperglycemia, without long-term current use of insulin  -     Hemoglobin A1C; Future; Expected date: 02/20/2024  -     semaglutide (OZEMPIC) 1 mg/dose (4 mg/3 mL); Inject 1 mg into the skin every 7 days.  Dispense: 3 mL; Refill: 2  Continue semaglutide and metformin as prescribed    2. Nausea  -     ondansetron (ZOFRAN-ODT) 8 MG TbDL; Take 1 tablet (8 mg total) by mouth every 12 (twelve) hours as needed (nausea).  Dispense: 60 tablet; Refill: 0    3. Gastroesophageal reflux disease without esophagitis  -     pantoprazole (PROTONIX) 40 MG tablet; Take 1 tablet (40 mg total) by mouth once daily.  Dispense: 90 tablet; Refill: 0    4. Class 2 severe obesity due to excess calories with serious comorbidity and body mass index (BMI) of 36.0 to 36.9 in adult  Body mass index is 36.32 kg/m².  Goal BMI <30.  Exercise 5 " times a week for 30 minutes per day.  Avoid soda, simple sugars, excessive rice, potatoes or bread. Limit fast foods and fried foods.  Choose complex carbs in moderation (example: green vegetables, beans, oatmeal). Eat plenty of fresh fruits and vegetables with lean meats daily.  Do not skip meals. Eat a balanced portion size.  Avoid fad diets. Consider permanent healthy life style changes.     5. Depression, recurrent  -     vilazodone (VIIBRYD) 20 mg Tab; Take 1 tablet (20 mg total) by mouth once daily.  Dispense: 90 tablet; Refill: 0  Discontinue Effexor as PHQ-9 score today is 13   Patient failed on Lexapro, Zoloft and venlafaxine past         Medication List with Changes/Refills   Current Medications    BUSPIRONE (BUSPAR) 10 MG TABLET    Take 1 tablet (10 mg total) by mouth 3 (three) times daily.       Start Date: 12/6/2023 End Date: 3/5/2024    DICLOFENAC SODIUM (SOLARAZE) 3 % GEL    Apply 1 application  topically 2 (two) times daily.       Start Date: 7/17/2023 End Date: --    LISINOPRIL-HYDROCHLOROTHIAZIDE (PRINZIDE,ZESTORETIC) 20-12.5 MG PER TABLET    Take 1 tablet by mouth once daily.       Start Date: 12/6/2023 End Date: 3/5/2024    METFORMIN (GLUCOPHAGE-XR) 500 MG ER 24HR TABLET    Take 1 tablet (500 mg total) by mouth 2 (two) times daily.       Start Date: 12/6/2023 End Date: 3/5/2024    PRAVASTATIN (PRAVACHOL) 20 MG TABLET    Take 1 tablet (20 mg total) by mouth once daily.       Start Date: 1/12/2024 End Date: --    SEMAGLUTIDE (OZEMPIC) 1 MG/DOSE (4 MG/3 ML)    Inject 1 mg into the skin every 7 days.       Start Date: 1/31/2024 End Date: 3/1/2024    VENLAFAXINE (EFFEXOR-XR) 75 MG 24 HR CAPSULE    Take 1 capsule (75 mg total) by mouth once daily.       Start Date: 2/19/2024 End Date: 5/19/2024          Follow up for Virtual Visit after 4/2/24 fpr Depression f/u and DM . In addition to their scheduled follow up, the patient has also been instructed to follow up on as needed basis.       Video Time  Documentation:  Spent 10 minutes with patient face to face discussed health concerns. More than 50% of this time was spent in counseling and coordination of care.  Answers submitted by the patient for this visit:  Diabetes Questionnaire (Submitted on 2/20/2024)  Chief Complaint: Diabetes problem  Diabetes type: type 2  MedicAlert ID: No  Disease duration: 12 Weeks  fatigue: No  foot paresthesias: No  foot ulcerations: No  polyphagia: No  polyuria: No  visual change: No  confusion: No  hunger: No  mood changes: Yes  pallor: No  sleepiness: No  speech difficulty: No  sweats: No  blackouts: No  hospitalization: No  nocturnal hypoglycemia: No  required assistance: No  required glucagon: No  CVA: No  heart disease: No  nephropathy: No  peripheral neuropathy: No  PVD: No  retinopathy: No  autonomic neuropathy: No  CAD risks: dyslipidemia, hypertension, obesity, stress  Current treatments: oral agent (dual therapy)  Treatment compliance: all of the time  Dose schedule: at bedtime  Given by: patient  Injection sites: abdominal wall  Home blood tests: 3-4 x per week  Home urines: 1-2 x per month  Monitoring compliance: inadequate  Blood glucose trend: no change  Weight trend: fluctuating minimally  Current diet: generally healthy  Meal planning: none  Exercise: intermittently  Dietitian visit: No  Eye exam current: No  Sees podiatrist: No     Yayo Hart(Attending)

## 2024-02-23 NOTE — ED PROVIDER NOTE - CLINICAL SUMMARY MEDICAL DECISION MAKING FREE TEXT BOX
Patient/Other(s) This is a 38-year-old female past medical history of seizures, asthma, htn, gerd,  factor v leiden, psychiatric history schizoaffective bipolar disorder, insomnia with complaint of increased anxiety, back pain and intrusive thoughts. Reports she has lots of back pain took Robaxin  500 mg but feels that is not a strong medication for her, and needs higher doses. At times she feels she would be better if she would be dead.  She says most likely if she would not have these type of thoughts is she would not have so much of back pain.  Collateral info by waleska,  refer to the note.

## 2024-03-01 NOTE — ED ADULT TRIAGE NOTE - BP NONINVASIVE SYSTOLIC (MM HG)
Paola Camacho is a 44 year old female presenting to the walk-in clinic today for left hand pain after using a drill yesterday from an awkward position.  Was not especially painful right away but worsened throughout the day and today.  Pt states she noticed some swelling at the base of her thumb, throbbing pain, and numbness to fingertips and thumb..         Swabs/Specimens collected during rooming process:  None      PPE worn during room process. Mask worn by writer.  Patient: mask    Patient would like communication of their results via:     Cell Phone:   Telephone Information:   Mobile [phone]     Okay to leave a message containing results? Yes   119

## 2024-03-13 NOTE — ED PROVIDER NOTE - CADM POA CENTRAL LINE
[FreeTextEntry1] : 2/14/23: Reviewed recent unattended home sleep testing with patient: No significant sleep disordered breathing was observed.  TST 6 h 39 min.  Patient states better than usual, reports 3 nights on average week when wakes feeling like she cannot breathe.  Has complaints of chronic post nasal drip, sees ENT on flonase and azelastine. Stopped any alcohol use without change.  4/18/2023: Overnight polysomnography performed March 27, 2023 reviewed with patient.  She slept poorly with total sleep time of just over 4 hours, did have mild to moderate sleep apnea, apnea-hypopnea index 12.1 (4%), 15.6 (3%).  Since I last saw her she has seen an ear nose and throat doctor who tells her that she has a crowded oropharynx is actually recommended physical therapy.  Her sleep habits appear irregular, time between 11 and 12, up to 2 hours sleep latency, irregular wake time, sometimes as late as 10.  She continues to complain of episodes of awakening with a feeling of gasping and choking.  12/18/23: She received CPAP at home on October 25, at first was having a lot of problems with this, but changed to a more comfortable interface, standard nasal mask, and since about December 1 has been doing quite well with this.  Compliance report today shows she has now used it for 18 days, 14 of which were more than 4 hours, apnea-hypopnea index on treatment 1.5, leak is acceptable, 95th percentile pressure 11.1.  She is just starting to see improvement in her daytime function and sleep quality. No significant medical history or changes in medication since last visit.   3/13/24: Recently her usage of CPAP has been a little sporadic, mostly because of nasal congestion, but when she does use it she does feel that it improves her.  Download from the last 30 days shows 17 out of 30 days usage, 9 days for more than or equal to 4 hours, when used her apnea-hypopnea index is 0.6 and there is very little mask leak.  Median pressure is 8.7.  Unfortunately she has recently been having problems with numbness of her hands and was told recently that she has significant cervical spine disease which may require surgery.  She has been evaluated for this at [facility].
No

## 2024-03-14 NOTE — PROCEDURE NOTE - NSTOLERANCE_GEN_A_CORE
Patient tolerated procedure well.
[Negative] : Genitourinary

## 2024-04-02 NOTE — ED ADULT NURSE NOTE - WOUND CHECK/REMOVAL TYPE
Missed to provide excuse note for school. What is the name of the school so I can fax over the note, n/a l/m.  
none noted

## 2024-04-12 ENCOUNTER — APPOINTMENT (OUTPATIENT)
Dept: NEUROLOGY | Facility: CLINIC | Age: 39
End: 2024-04-12

## 2024-05-09 ENCOUNTER — APPOINTMENT (OUTPATIENT)
Dept: INTERNAL MEDICINE | Facility: CLINIC | Age: 39
End: 2024-05-09
Payer: MEDICARE

## 2024-05-09 VITALS
RESPIRATION RATE: 16 BRPM | OXYGEN SATURATION: 96 % | SYSTOLIC BLOOD PRESSURE: 94 MMHG | BODY MASS INDEX: 44.41 KG/M2 | HEIGHT: 68 IN | HEART RATE: 118 BPM | DIASTOLIC BLOOD PRESSURE: 60 MMHG | WEIGHT: 293 LBS

## 2024-05-09 DIAGNOSIS — F31.60 BIPOLAR DISORDER, CURRENT EPISODE MIXED, UNSPECIFIED: ICD-10-CM

## 2024-05-09 DIAGNOSIS — E66.01 MORBID (SEVERE) OBESITY DUE TO EXCESS CALORIES: ICD-10-CM

## 2024-05-09 DIAGNOSIS — F44.5 CONVERSION DISORDER WITH SEIZURES OR CONVULSIONS: ICD-10-CM

## 2024-05-09 DIAGNOSIS — R10.13 EPIGASTRIC PAIN: ICD-10-CM

## 2024-05-09 DIAGNOSIS — E78.5 HYPERLIPIDEMIA, UNSPECIFIED: ICD-10-CM

## 2024-05-09 DIAGNOSIS — R03.0 ELEVATED BLOOD-PRESSURE READING, W/OUT DIAGNOSIS OF HYPERTENSION: ICD-10-CM

## 2024-05-09 DIAGNOSIS — R63.5 ABNORMAL WEIGHT GAIN: ICD-10-CM

## 2024-05-09 DIAGNOSIS — H91.90 UNSPECIFIED HEARING LOSS, UNSPECIFIED EAR: ICD-10-CM

## 2024-05-09 DIAGNOSIS — F25.9 SCHIZOAFFECTIVE DISORDER, UNSPECIFIED: ICD-10-CM

## 2024-05-09 DIAGNOSIS — J45.909 UNSPECIFIED ASTHMA, UNCOMPLICATED: ICD-10-CM

## 2024-05-09 DIAGNOSIS — M79.605 PAIN IN LEFT LEG: ICD-10-CM

## 2024-05-09 DIAGNOSIS — Z00.00 ENCOUNTER FOR GENERAL ADULT MEDICAL EXAMINATION W/OUT ABNORMAL FINDINGS: ICD-10-CM

## 2024-05-09 DIAGNOSIS — M54.50 LOW BACK PAIN, UNSPECIFIED: ICD-10-CM

## 2024-05-09 DIAGNOSIS — R56.9 UNSPECIFIED CONVULSIONS: ICD-10-CM

## 2024-05-09 DIAGNOSIS — E55.9 VITAMIN D DEFICIENCY, UNSPECIFIED: ICD-10-CM

## 2024-05-09 DIAGNOSIS — E03.9 HYPOTHYROIDISM, UNSPECIFIED: ICD-10-CM

## 2024-05-09 DIAGNOSIS — K21.9 GASTRO-ESOPHAGEAL REFLUX DISEASE W/OUT ESOPHAGITIS: ICD-10-CM

## 2024-05-09 DIAGNOSIS — D68.51 ACTIVATED PROTEIN C RESISTANCE: ICD-10-CM

## 2024-05-09 DIAGNOSIS — M25.561 PAIN IN RIGHT KNEE: ICD-10-CM

## 2024-05-09 PROCEDURE — G0439: CPT

## 2024-05-09 PROCEDURE — 99214 OFFICE O/P EST MOD 30 MIN: CPT | Mod: 25

## 2024-05-09 RX ORDER — QUETIAPINE FUMARATE 200 MG/1
200 TABLET ORAL
Refills: 0 | Status: ACTIVE | COMMUNITY

## 2024-05-09 RX ORDER — FAMOTIDINE 20 MG/1
20 TABLET, FILM COATED ORAL
Qty: 60 | Refills: 5 | Status: ACTIVE | COMMUNITY

## 2024-05-09 RX ORDER — PANTOPRAZOLE 40 MG/1
40 TABLET, DELAYED RELEASE ORAL DAILY
Refills: 0 | Status: DISCONTINUED | COMMUNITY
End: 2024-05-09

## 2024-05-09 RX ORDER — APIXABAN 5 MG/1
5 TABLET, FILM COATED ORAL
Refills: 0 | Status: ACTIVE | COMMUNITY
Start: 2022-09-16

## 2024-05-09 RX ORDER — ATORVASTATIN CALCIUM 40 MG/1
40 TABLET, FILM COATED ORAL
Refills: 0 | Status: ACTIVE | COMMUNITY

## 2024-05-09 RX ORDER — LEVOTHYROXINE SODIUM 0.1 MG/1
100 TABLET ORAL
Qty: 30 | Refills: 5 | Status: ACTIVE | COMMUNITY

## 2024-05-09 RX ORDER — QUETIAPINE FUMARATE 25 MG/1
25 TABLET ORAL DAILY
Refills: 0 | Status: ACTIVE | COMMUNITY

## 2024-05-09 RX ORDER — ESCITALOPRAM OXALATE 20 MG/1
20 TABLET ORAL
Refills: 0 | Status: ACTIVE | COMMUNITY

## 2024-05-09 RX ORDER — RISPERIDONE 3 MG/1
3 TABLET, FILM COATED ORAL
Refills: 0 | Status: ACTIVE | COMMUNITY

## 2024-05-09 RX ORDER — ENOXAPARIN SODIUM 40 MG/.4ML
40 INJECTION, SOLUTION SUBCUTANEOUS
Qty: 1 | Refills: 0 | Status: DISCONTINUED | COMMUNITY
Start: 2022-08-19 | End: 2024-05-09

## 2024-05-09 RX ORDER — RISPERIDONE 2 MG/1
2 TABLET, FILM COATED ORAL
Refills: 0 | Status: ACTIVE | COMMUNITY

## 2024-05-09 RX ORDER — DIVALPROEX SODIUM 250 MG/1
250 TABLET, EXTENDED RELEASE ORAL TWICE DAILY
Refills: 0 | Status: ACTIVE | COMMUNITY

## 2024-05-09 RX ORDER — CLONIDINE HYDROCHLORIDE 0.1 MG/1
0.1 TABLET ORAL
Qty: 60 | Refills: 3 | Status: ACTIVE | COMMUNITY

## 2024-05-09 RX ORDER — NALTREXONE HYDROCHLORIDE 50 MG/1
50 TABLET, FILM COATED ORAL DAILY
Refills: 0 | Status: ACTIVE | COMMUNITY

## 2024-05-09 NOTE — PHYSICAL EXAM
[No Acute Distress] : no acute distress [Well Developed] : well developed [Well-Appearing] : well-appearing [Normal Voice/Communication] : normal voice/communication [Normal Sclera/Conjunctiva] : normal sclera/conjunctiva [PERRL] : pupils equal round and reactive to light [EOMI] : extraocular movements intact [Normal Outer Ear/Nose] : the outer ears and nose were normal in appearance [No JVD] : no jugular venous distention [No Lymphadenopathy] : no lymphadenopathy [Supple] : supple [Thyroid Normal, No Nodules] : the thyroid was normal and there were no nodules present [No Respiratory Distress] : no respiratory distress  [No Accessory Muscle Use] : no accessory muscle use [Clear to Auscultation] : lungs were clear to auscultation bilaterally [Normal Rate] : normal rate  [Regular Rhythm] : with a regular rhythm [Normal S1, S2] : normal S1 and S2 [No Murmur] : no murmur heard [No Carotid Bruits] : no carotid bruits [No Abdominal Bruit] : a ~M bruit was not heard ~T in the abdomen [Pedal Pulses Present] : the pedal pulses are present [No Edema] : there was no peripheral edema [No Palpable Aorta] : no palpable aorta [Soft] : abdomen soft [Non-distended] : non-distended [No Masses] : no abdominal mass palpated [No HSM] : no HSM [Normal Bowel Sounds] : normal bowel sounds [Normal Posterior Cervical Nodes] : no posterior cervical lymphadenopathy [Normal Anterior Cervical Nodes] : no anterior cervical lymphadenopathy [No CVA Tenderness] : no CVA  tenderness [No Spinal Tenderness] : no spinal tenderness [No Joint Swelling] : no joint swelling [Grossly Normal Strength/Tone] : grossly normal strength/tone [Coordination Grossly Intact] : coordination grossly intact [No Focal Deficits] : no focal deficits [Normal Gait] : normal gait [Speech Grossly Normal] : speech grossly normal [Memory Grossly Normal] : memory grossly normal [Alert and Oriented x3] : oriented to person, place, and time [de-identified] : obese [de-identified] : mild upper abd tenderness, no rebound or gaurding [de-identified] : Affect seems flat

## 2024-05-09 NOTE — HEALTH RISK ASSESSMENT
[No] : In the past 12 months have you used drugs other than those required for medical reasons? No [No falls in past year] : Patient reported no falls in the past year [Medical reason not done] : Medical reason not done [Behavioral] : behavioral [Single] : single [Feels Safe at Home] : Feels safe at home [Smoke Detector] : smoke detector [Carbon Monoxide Detector] : carbon monoxide detector [Seat Belt] :  uses seat belt [Unemployed] : unemployed [Fully functional (bathing, dressing, toileting, transferring, walking, feeding)] : Fully functional (bathing, dressing, toileting, transferring, walking, feeding) [Former] : Former [de-identified] : former [Audit-CScore] : 0 [de-identified] : psych disorders - following with [facility] on meds [de-identified] : group home [de-identified] : assistance

## 2024-05-09 NOTE — HISTORY OF PRESENT ILLNESS
[Other: _____] : [unfilled] [FreeTextEntry1] : annual PE [de-identified] : 39 y/o female, resident of [facility], with a hx of factor V leiden deficiency, DVT, hypothyroid, high BP, ssz d/o with hx psychogenic sz, asthma, bipolar d/o, schizoaffective d/o, endometriosis here for annual PE and f/u last seen ~ 1.5 years ago.  comes with forms for completion. s/p admissions/ER visits with pseudoseizures,  Also with mult ER visits with somatic sx. Reprots that she has been having GI sx with nausea, vomiting and pain.  Has been following with GI.  Has upcoming appt.   following with neurology and psych. Has been on psych meds Has been having weight gain.  Asking about the thyroid.   Available notes reviewed. Has followed up with neurology. noted that she has upcoming appt.   Has been on a/c and uses compression hose for the Antiphospholipid Ab.   Reports that the pains have been improved with the meds. Has had the psych meds adjusted. Has upcoming f/u appt. following with hematology.  no longer follows with ortho.  with continued knee and back pains.  Noted that she has not been wearing brace.   some numbness and swelling at the hands. hx pelvic pains as noted.  gyn - following - has appt to f/u. follows with ophtho - due in sept (6 mo) follows with dental.  flu vaccine -

## 2024-05-09 NOTE — ASSESSMENT
[FreeTextEntry1] : Discussed with the patient health care maintenance.   Discussed age and condition appropriate vaccinations.   Discussed age appropriate cancer screening testing as indicated including colon cancer screening/colonoscopy, cervical cancer screening/PAP testing, breast cancer screening/mammogram and skin cancer screening.   Discussed protection from the sun.   Discussed healthy diet, exercise and weight control for health. Discussed healthy habits including abstaining from smoking and drugs and abstention/moderation with alcohol. Discussed routine regular ophthalmology and dental follow up. Routine labs discussed with the patient and sent.

## 2024-05-09 NOTE — REVIEW OF SYSTEMS
[Joint Pain] : joint pain [Abdominal Pain] : abdominal pain [Nausea] : nausea [Vomiting] : vomiting [Back Pain] : back pain [Negative] : Eyes [Joint Stiffness] : no joint stiffness [Joint Swelling] : no joint swelling [Muscle Weakness] : no muscle weakness [Muscle Pain] : no muscle pain [FreeTextEntry9] : leg pain [de-identified] : as per HPI

## 2024-05-22 ENCOUNTER — APPOINTMENT (OUTPATIENT)
Dept: CARDIOLOGY | Facility: CLINIC | Age: 39
End: 2024-05-22

## 2024-05-22 NOTE — ED ADULT NURSE NOTE - CAS TRG GENERAL NORM CIRC DET
Strep throat is an infection of the throat caused by germs called group A streptococci. Strep throat is not the same as just a sore throat. It may be much worse. With strep throat, your doctor may need to treat the infection with drugs. You may start to feel better 1 to 2 days after you start your drugs.    Medications  Amoxicillin twice daily for 10 days. Please take to completion, even if you start feeling better. Take with food to avoid GI upset.  Ibuprofen 600 mg every 6-8 hours as needed for pain.    Care at home  Throw away your toothbrush or sterilize it after 48 hours of being on antibiotics.  Gargle with warm salt water a few times daily. Mix 1/2 teaspoon (2.5 grams) salt with a cup (240 mL) of warm water.  Use a cool mist humidifier to keep your throat moist.  Drink lots of water, juice, or broth.  Suck on ice chips or throat lozenges to ease pain.  Stop smoking. Talk to your doctor if you need help quitting. Stay away from those who are smoking.  If your throat feels too sore to eat solid foods, you may drink juice, milk, milkshakes, or soups.  Do not drink sports drinks, soft drinks, undiluted fruit juice, or beverages that have too much sugar. These may cause fluid loss and throat itchiness.  Avoid caffeine, acidic juices like orange juice or lemonade, and beer, wine, and mixed drinks (alcohol). These can worsen your signs.    When do I need to call the doctor?   Signs of infection. These include a fever of 100.4°F (38°C) or higher, chills, very bad sore throat, ear or sinus pain, cough, more sputum or change in color of sputum.  Fast heartbeat  Very tired  Trouble drinking and eating soups and soft foods  Changes in the color of your urine    Should you develop any worsening or new symptoms after leaving urgent care, it is recommended that you go to the ER for further/repeat evaluation.      Follow up with your PCP in 3-5 days after your urgent care visit.     Please remember that you have received  care at an urgent care today. Urgent cares are not emergency rooms and are not equipped to handle life threatening emergencies and cannot rule in or out certain medical conditions and you may be released before all of your medical problems are known or treated, please schedule all follow up appointments as discussed and if you have worsening symptoms please go to the ER to rule out potential life threatening problems, as discussed.       Strong peripheral pulses

## 2024-05-23 ENCOUNTER — LABORATORY RESULT (OUTPATIENT)
Age: 39
End: 2024-05-23

## 2024-06-01 ENCOUNTER — NON-APPOINTMENT (OUTPATIENT)
Age: 39
End: 2024-06-01

## 2024-06-11 RX ORDER — EPINEPHRINE 0.3 MG/.3ML
0.3 INJECTION INTRAMUSCULAR
Qty: 1 | Refills: 0 | Status: ACTIVE | COMMUNITY
Start: 1900-01-01 | End: 1900-01-01

## 2024-06-24 ENCOUNTER — APPOINTMENT (OUTPATIENT)
Dept: NEUROLOGY | Facility: CLINIC | Age: 39
End: 2024-06-24

## 2024-07-05 ENCOUNTER — NON-APPOINTMENT (OUTPATIENT)
Age: 39
End: 2024-07-05

## 2024-07-11 NOTE — ED PROVIDER NOTE - IV ALTEPLASE EXCL REL HIDDEN
Apply thin layer aquaphore FIRST and then layer zinc ointment like BUTT PASTE or desitin over that, when cleaning him can use wipes, but DRY skin thoroughly before applying any cream to area  as yeast loves wet skin that is trapped under cream and will grow well in that environment    Yeast will grow at edges of diaper rash as tiny red clustered bumps, once it grows you need nystatin oint 3 times a day ( prescription) or lotrimin cream 3 times a day ( over the counter)  If the wipes are causing pain ( even water wipes have preservatives that can sting open skin) then use water and soft cloth to clean then soft dry cloth to dry    You can also rinse wipes out with warm water to see if that helps make them less astringent    Sometimes if not healing, it is good to wash area with mild soap and water and rinse and then dry before applying diaper rash creams       show

## 2024-07-18 ENCOUNTER — EMERGENCY (EMERGENCY)
Facility: HOSPITAL | Age: 39
LOS: 1 days | Discharge: HOME | End: 2024-07-18
Admitting: EMERGENCY MEDICINE
Payer: MEDICARE

## 2024-07-18 ENCOUNTER — NON-APPOINTMENT (OUTPATIENT)
Age: 39
End: 2024-07-18

## 2024-07-18 VITALS
SYSTOLIC BLOOD PRESSURE: 120 MMHG | TEMPERATURE: 98 F | HEART RATE: 108 BPM | OXYGEN SATURATION: 100 % | RESPIRATION RATE: 16 BRPM | DIASTOLIC BLOOD PRESSURE: 87 MMHG

## 2024-07-18 VITALS
TEMPERATURE: 98 F | RESPIRATION RATE: 16 BRPM | OXYGEN SATURATION: 97 % | HEART RATE: 102 BPM | SYSTOLIC BLOOD PRESSURE: 103 MMHG | DIASTOLIC BLOOD PRESSURE: 71 MMHG

## 2024-07-18 LAB
AMPHET UR-MCNC: NEGATIVE — SIGNIFICANT CHANGE UP
APPEARANCE UR: ABNORMAL
BACTERIA # UR AUTO: ABNORMAL /HPF
BARBITURATES UR SCN-MCNC: NEGATIVE — SIGNIFICANT CHANGE UP
BENZODIAZ UR-MCNC: NEGATIVE — SIGNIFICANT CHANGE UP
BILIRUB UR-MCNC: ABNORMAL
CAST: 1 /LPF — SIGNIFICANT CHANGE UP (ref 0–4)
COCAINE METAB.OTHER UR-MCNC: NEGATIVE — SIGNIFICANT CHANGE UP
COLOR SPEC: SIGNIFICANT CHANGE UP
CREATININE URINE RESULT, DAU: 456 MG/DL — SIGNIFICANT CHANGE UP
DIFF PNL FLD: NEGATIVE — SIGNIFICANT CHANGE UP
FENTANYL UR QL SCN: NEGATIVE — SIGNIFICANT CHANGE UP
GLUCOSE UR QL: NEGATIVE MG/DL — SIGNIFICANT CHANGE UP
KETONES UR-MCNC: ABNORMAL MG/DL
LEUKOCYTE ESTERASE UR-ACNC: NEGATIVE — SIGNIFICANT CHANGE UP
METHADONE UR-MCNC: NEGATIVE — SIGNIFICANT CHANGE UP
NITRITE UR-MCNC: NEGATIVE — SIGNIFICANT CHANGE UP
OPIATES UR-MCNC: NEGATIVE — SIGNIFICANT CHANGE UP
OXYCODONE UR-MCNC: NEGATIVE — SIGNIFICANT CHANGE UP
PCP SPEC-MCNC: SIGNIFICANT CHANGE UP
PCP UR-MCNC: NEGATIVE — SIGNIFICANT CHANGE UP
PH UR: 6 — SIGNIFICANT CHANGE UP (ref 5–8)
PROT UR-MCNC: 30 MG/DL
RBC CASTS # UR COMP ASSIST: 2 /HPF — SIGNIFICANT CHANGE UP (ref 0–4)
REVIEW: SIGNIFICANT CHANGE UP
SP GR SPEC: 1.04 — HIGH (ref 1–1.03)
SQUAMOUS # UR AUTO: 30 /HPF — HIGH (ref 0–5)
THC UR QL: NEGATIVE — SIGNIFICANT CHANGE UP
UROBILINOGEN FLD QL: 1 MG/DL — SIGNIFICANT CHANGE UP (ref 0.2–1)
WBC UR QL: 4 /HPF — SIGNIFICANT CHANGE UP (ref 0–5)

## 2024-07-18 PROCEDURE — 90792 PSYCH DIAG EVAL W/MED SRVCS: CPT

## 2024-07-18 PROCEDURE — 99285 EMERGENCY DEPT VISIT HI MDM: CPT

## 2024-07-18 RX ORDER — DIPHENHYDRAMINE HCL 12.5MG/5ML
50 ELIXIR ORAL ONCE
Refills: 0 | Status: COMPLETED | OUTPATIENT
Start: 2024-07-18 | End: 2024-07-18

## 2024-07-18 RX ORDER — HYDROXYZINE PAMOATE 50 MG/1
25 CAPSULE ORAL ONCE
Refills: 0 | Status: COMPLETED | OUTPATIENT
Start: 2024-07-18 | End: 2024-07-18

## 2024-07-18 RX ORDER — LORAZEPAM 0.5 MG
2 TABLET ORAL ONCE
Refills: 0 | Status: DISCONTINUED | OUTPATIENT
Start: 2024-07-18 | End: 2024-07-18

## 2024-07-18 RX ORDER — HALOPERIDOL DECANOATE 100 MG/ML
5 VIAL (ML) INTRAMUSCULAR ONCE
Refills: 0 | Status: COMPLETED | OUTPATIENT
Start: 2024-07-18 | End: 2024-07-18

## 2024-07-18 RX ADMIN — Medication 2 MILLIGRAM(S): at 16:17

## 2024-07-18 RX ADMIN — Medication 5 MILLIGRAM(S): at 16:17

## 2024-07-18 RX ADMIN — Medication 50 MILLIGRAM(S): at 16:17

## 2024-07-18 RX ADMIN — Medication 2 MILLIGRAM(S): at 18:15

## 2024-07-18 NOTE — ED BEHAVIORAL HEALTH ASSESSMENT NOTE - NSBHMSEAFFCONG_PSY_A_CORE
Attempted to reach patient to obtain an updated e-mail address or fax number for staff to send a consent for him to sign and send back to the office for Intrace[facility] Procedure authorization to get started, no answer, left patient a detailed message   Congruent

## 2024-07-18 NOTE — ED PROVIDER NOTE - CLINICAL SUMMARY MEDICAL DECISION MAKING FREE TEXT BOX
39 year old female with PMH of Factor V Leiden Mutation dx in 2016 with multiple DVTs/PE, psychogenic nonepileptic seizures, vitamin D deficiency, hx of DVT and PE, chronic ischemic heart disease, IBS, GERD, PID, endometriosis, headache syndrome, asthma, hx of shoulder abscess with drainage, hypothyroidism, psychogenic nonepileptic attacks or functional seizures on EEG, Mild diffuse cerebral dysfunction, Affective Dysregulation/Intermittent Explosive Disorder, Personality Disorder, Mild Intellectual disability, hx of remote Cocaine use disorder presents to the ED for evaluation of suicidal thought. HD stable, IMP: SI with plan; Nausea with no other symptoms and non-acute abdomen on exam. Plan for labs, psych consult, reassess.
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
## 2024-07-18 NOTE — ED ADULT TRIAGE NOTE - CHIEF COMPLAINT QUOTE
Pt brought in for suicidal ideation with plan, states "wanted to jump in front of bus." Denies visual/auditory hallucinations, homicidal ideation, etoh/illicit drug use. pmhx: bipolar
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
## 2024-07-18 NOTE — ED BEHAVIORAL HEALTH ASSESSMENT NOTE - DETAILS
Reports 2 past SAs, last in 2017 stating "I took a lot of pills to try to harm myself" affirming intent to die. staff member aware pt identifies getting angry with staff and isolating herself as warning signs. she watches tv, talks with her roommate as a distraction. Names her family as protective factors. +full body aches as per HPI, extensive hx of making threats in response to dissatisfaction stomachache, constipation toradol patient and staff aware of disposition and plans in chart

## 2024-07-18 NOTE — BH SAFETY PLAN - ENVIRONMENT SAFETY 2:
Patient advised and agreed to return to ED or nearest hospital or call 911 for any worsening symptoms. 
Statement Selected

## 2024-07-18 NOTE — ED BEHAVIORAL HEALTH ASSESSMENT NOTE - DESCRIPTION
Initially uncooperative in ER and received Haldol 5mg, Ativan 2mg and Benadryl 25mg IM at 1047am. She then requested more medications and was given Thorazine 50mg IM voluntarily.  Vital Signs Last 24 Hrs  T(C): 36.2 (03 Feb 2023 12:18), Max: 36.7 (03 Feb 2023 10:13)  T(F): 97.2 (03 Feb 2023 12:18), Max: 98 (03 Feb 2023 10:13)  HR: 117 (03 Feb 2023 12:18) (117 - 120)  BP: 116/89 (03 Feb 2023 12:18) (116/89 - 127/82)  BP(mean): --  RR: 18 (03 Feb 2023 12:18) (17 - 18)  SpO2: 100% (03 Feb 2023 12:18) (100% - 100%)    Parameters below as of 03 Feb 2023 12:18  Patient On (Oxygen Delivery Method): room air as per HPI lives at [facility] group home, attends program, enjoy watching scary movies, bowling Received Haldol 5mg, Ativan 2mg and Benadryl 50mg IM       Vital Signs Last 24 Hrs  T(C): 36.5 (18 Jul 2024 16:28), Max: 36.6 (18 Jul 2024 13:58)  T(F): 97.7 (18 Jul 2024 16:28), Max: 97.8 (18 Jul 2024 13:58)  HR: 100 (18 Jul 2024 16:28) (100 - 108)  BP: 119/81 (18 Jul 2024 16:28) (101/74 - 120/87)  BP(mean): --  RR: 17 (18 Jul 2024 16:28) (16 - 17)  SpO2: 100% (18 Jul 2024 16:28) (99% - 100%)    Parameters below as of 18 Jul 2024 16:28  Patient On (Oxygen Delivery Method): room air

## 2024-07-18 NOTE — ED PROVIDER NOTE - PROGRESS NOTE DETAILS
JORGE L Ramirez: Pt is noted to be agitated with potential harm to self. She is observed to be attempting to hit her head against the wall. Will medicate her with Ativan/Haldol/Benadryl for her safety. JORGE L Ramirez: Restrain discontinued. Pt is now calm and cooperative. Labs cancelled, not indicated as will not be admitted. Senior care ride arranged for transport. Pt is medically and psychiatrically cleared. Psych consult appreciated.

## 2024-07-18 NOTE — ED BEHAVIORAL HEALTH ASSESSMENT NOTE - VIOLENCE RISK FACTORS:
Affective dysregulation/Impulsivity/History of being victimized/traumatized/Irritability Substance abuse/Affective dysregulation/Impulsivity/History of being victimized/traumatized/Irritability
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
## 2024-07-18 NOTE — ED PROVIDER NOTE - NSFOLLOWUPINSTRUCTIONS_ED_ALL_ED_FT
Follow up with your PMD within 1-2 days or you can call our clinic at [phone] for an appointment  Take all of your other medications as previously prescribed.  Worsening, continued or ANY new concerning symptoms return to the Emergency Department.

## 2024-07-18 NOTE — ED BEHAVIORAL HEALTH ASSESSMENT NOTE - NS ED [facility] ED COURSE PSYCHIATRIC MEDICATION GIVEN
Voluntary Intramuscular (indication, name, dose, time)/Involuntary Intramuscular (indication, name, dose, time) Involuntary Intramuscular (indication, name, dose, time)

## 2024-07-18 NOTE — ED BEHAVIORAL HEALTH ASSESSMENT NOTE - CURRENT MEDICATION
Updated med list - 02/03/2023 by staff Fabi MAYNARD  Atorvastatin 80mg po qhs  Thorazine 50mg po tid  Elaquis 5mg po bid  Synthroid 75mcg daily  Melatonin 3mg po qhs  Multivitamin daily  Protonix 40mg po daily  Seroquel 200mg po DAily 50mg po Noon  Trileptal 150mg po bid Atorvastatin 80mg po qhs  Thorazine 50mg po tid  Elaquis 5mg po bid  Synthroid 75mcg daily  Melatonin 3mg po qhs  Multivitamin daily  Protonix 40mg po daily  Seroquel 200mg po DAily 50mg po Noon  Trileptal 150mg po bid

## 2024-07-18 NOTE — ED PROVIDER NOTE - PATIENT PORTAL LINK FT
You can access the FollowMyHealth Patient Portal offered by [facility] by registering at the following website: [email or web address]. By joining [employer]’s FollowMyHealth portal, you will also be able to view your health information using other applications (apps) compatible with our system.

## 2024-07-18 NOTE — ED BEHAVIORAL HEALTH ASSESSMENT NOTE - REFERRAL / APPOINTMENT DETAILS
f/u with outpatient treatment team Continue to follow up with outpatient providers as previously scheduled

## 2024-07-18 NOTE — ED BEHAVIORAL HEALTH ASSESSMENT NOTE - MEDICATIONS (PRESCRIPTIONS, DIRECTIONS)
c/w home meds Continue current psychiatric medications as previously prescribed by previous providers.  Advised to seek laxatives PRN as needed.

## 2024-07-18 NOTE — ED BEHAVIORAL HEALTH NOTE - BEHAVIORAL HEALTH NOTE
Writer informed by MD that pt is cleared for discharge. Pt arrives from group home.     Pt from [facility] Group Home located at [address] [place] [address]., phone #[phone]. Writer contacted pt's group home and spoke with staff member, Linda Zhao , who was informed of pt's discharge. confirmed pt to travel via AMBULANCE with SUPERVISION. Group home address confirmed. Writer informed by MD that pt is cleared for discharge. Pt arrives from group home.     Pt from [facility]. [facility] Home located at [address] [place] [address]., phone #[phone]. Writer contacted pt's group home and spoke with staff member, Linda Zhao , who was informed of pt's discharge. confirmed pt to travel via AMBULANCE with SUPERVISION of ems . Group home address confirmed.    Date: 07/18/2024 Time: call Inv# [id number]  Reason for Trip  Discharge    Medicaid ID (DEION #)  [id number]      [facility]rollee Phone  [phone]    Transport  [facility]    Transport Phone  [phone] Writer informed by MD that pt is cleared for discharge. Pt arrives from group home.     Pt from [facility] located at [address] [place] [address]., phone #[phone]. Writer contacted pt's group home and spoke with staff member, Linda Zhao , who was informed of pt's discharge. confirmed pt to travel via AMBULANCE with SUPERVISION of ems . Group home address confirmed.    Date: 07/18/2024 Time: call Inv# [id number]  Reason for Trip  Discharge    Medicaid ID (DEION #)  [id number]      [facility] Phone  [phone]    Transport  [facility]    Transport Phone  [phone]    worker called [facility] care to confirm trip and to Kerri who confirms trip and . trip# [id number]  60 minutes.

## 2024-07-19 ENCOUNTER — APPOINTMENT (OUTPATIENT)
Dept: PEDIATRIC ALLERGY IMMUNOLOGY | Facility: CLINIC | Age: 39
End: 2024-07-19

## 2024-07-21 ENCOUNTER — EMERGENCY (EMERGENCY)
Facility: HOSPITAL | Age: 39
LOS: 1 days | Discharge: HOME | End: 2024-07-21
Attending: EMERGENCY MEDICINE
Payer: MEDICARE

## 2024-07-21 VITALS
RESPIRATION RATE: 16 BRPM | OXYGEN SATURATION: 100 % | SYSTOLIC BLOOD PRESSURE: 98 MMHG | DIASTOLIC BLOOD PRESSURE: 68 MMHG | TEMPERATURE: 98 F | HEART RATE: 109 BPM | WEIGHT: 216.05 LBS | HEIGHT: 68 IN

## 2024-07-21 PROCEDURE — ZZZZZ: CPT

## 2024-07-21 PROCEDURE — 99285 EMERGENCY DEPT VISIT HI MDM: CPT

## 2024-07-21 RX ORDER — FAMOTIDINE 40 MG
20 TABLET ORAL ONCE
Refills: 0 | Status: COMPLETED | OUTPATIENT
Start: 2024-07-21 | End: 2024-07-21

## 2024-07-21 RX ORDER — DIPHENHYDRAMINE HCL 12.5MG/5ML
25 ELIXIR ORAL ONCE
Refills: 0 | Status: COMPLETED | OUTPATIENT
Start: 2024-07-21 | End: 2024-07-21

## 2024-07-21 RX ORDER — FAMOTIDINE 40 MG
20 TABLET ORAL ONCE
Refills: 0 | Status: DISCONTINUED | OUTPATIENT
Start: 2024-07-21 | End: 2024-07-21

## 2024-07-21 RX ORDER — DEXAMETHASONE 1 MG/1
10 TABLET ORAL ONCE
Refills: 0 | Status: COMPLETED | OUTPATIENT
Start: 2024-07-21 | End: 2024-07-21

## 2024-07-21 RX ORDER — DIPHENHYDRAMINE HCL 12.5MG/5ML
25 ELIXIR ORAL ONCE
Refills: 0 | Status: DISCONTINUED | OUTPATIENT
Start: 2024-07-21 | End: 2024-07-21

## 2024-07-21 RX ADMIN — DEXAMETHASONE 102 MILLIGRAM(S): 1 TABLET ORAL at 21:21

## 2024-07-21 RX ADMIN — Medication 25 MILLIGRAM(S): at 20:57

## 2024-07-21 RX ADMIN — DEXAMETHASONE 10 MILLIGRAM(S): 1 TABLET ORAL at 21:51

## 2024-07-21 RX ADMIN — Medication 20 MILLIGRAM(S): at 20:57

## 2024-07-21 NOTE — ED ADULT NURSE NOTE - NSFALLRISKFACTORS_ED_ALL_ED
Eliquis/Coagulation: Bleeding disorder either through use of anticoagulants or underlying clinical condition(s)
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
## 2024-07-21 NOTE — ED PROVIDER NOTE - CLINICAL SUMMARY MEDICAL DECISION MAKING FREE TEXT BOX
39-year-old female presents from group home complaints of itchiness/throat tightness after drinking an apple juice.  No history of anaphylaxis.  Here in the ED vitals are notable for SpO2 100%, BP 98/68, heart rate 109, afebrile.  On exam patient is well-appearing, breathing easily with good chest expansion.  Lungs CTAB bilaterally without wheezes or stridor.  Tongue and lips and cheeks and neck are WNL, no swelling.  No skin rashes or urticaria.  Abdomen soft, NT, ND.  Very likely an allergic reaction that is not anaphylaxis, will provide symptomatic relief with diphenhydramine and Pepcid.  Will observe with continuous pulse ox.  Complex care note reviewed, patient is not difficult or violent at this time.  Will reassess

## 2024-07-21 NOTE — ED PROVIDER NOTE - ATTENDING CONTRIBUTION TO CARE
Patient is a 39-year-old female with a history of bipolar disorder, personality disorder, diabetes mellitus, asthma, factor V Leyden, GERD, hypertension, epilepsy here for evaluation of allergic reaction.  Patient states that she had pineapple juice prior to arrival.  She states she was unaware that it was pineapple and juice.  She states that she felt throat tightness and itchiness after drinking it.  She reports that she is allergic to pineapple.  She reports that she feels short of breath. Patient reports she also feels itchy in her belly and her back.      VS noted  Gen. no acute distress, Non toxic   HEENT: EOMI, mmm, airway intact, no stridor  Lungs: CTAB/L no C/ W /R   CVS: RRR   Abd; Soft non tender, non distended   Ext: no edema  Skin: no rash, no hives  Neuro AAOx3 non focal clear speech  a/p:  allergic reaction–patient reports throat tightness, itchiness in her throat.  She has a known allergy to pineapples and had juice with pineapple in it.  Plan for IV Decadron, Pepcid, IV Benadryl, ENT eval.  Patient has no hives, nausea or vomiting.

## 2024-07-21 NOTE — ED PROVIDER NOTE - OBJECTIVE STATEMENT
39-year-old female with PMH of asthma, bipolar disorder, various personality disorders, diabetes, endometriosis, factor V Leyden, GERD, hypertension, epilepsy presents to the ED with complaints of allergic reaction.  Patient presents via EMS from her group home where she states about an hour ago she drank some fruit juice that had pineapple in it.  Patient is allergic to pineapple.  Patient has never had anaphylaxis, but states whenever she drinks or eats pineapple she gets an itchy mouth and throat.  Patient took 1 sip.  Started to feel itchiness in her throat and felt like her throat was getting tight.  Patient endorsing some nausea, but no difficulty breathing, shortness of breath, chest pain, abdominal pain, vomiting, diarrhea, constipation, urticaria, rash, angioedema, neck masses or swelling. No SI/HI/hallucinations.

## 2024-07-21 NOTE — ED ADULT NURSE NOTE - NSFALLHARMRISKINTERV_ED_ALL_ED

## 2024-07-21 NOTE — CONSULT NOTE ADULT - ASSESSMENT
39-year-old female with PMH of asthma, bipolar disorder, various personality disorders, diabetes, endometriosis, factor V Leyden, GERD, hypertension, epilepsy presents to the ED with complaints of allergic reaction.  Patient presents via EMS from her group home where she states about an hour ago she drank some fruit juice that had pineapple in it.  Patient is allergic to pineapple.  Patient has never had anaphylaxis, but states whenever she drinks or eats pineapple she gets an itchy mouth and throat.  Patient took 1 sip.  Started to feel itchiness in her throat and felt like her throat was getting tight. Bedside flexible laryngoscopy was unremarkable except for LPR. No evidence of angioedema, airway widely patent.

## 2024-07-21 NOTE — ED ADULT NURSE NOTE - NS PRO PASSIVE SMOKE EXP
Kady Fuller)  Obstetrics and Gynecology  [address] [place] [address], [address]  [place] [address]  Phone: [phone]  Fax: [phone]  Follow Up Time:   
No

## 2024-07-21 NOTE — ED ADULT NURSE NOTE - OBJECTIVE STATEMENT
The patient is a 39y female presenting to the ED BIBA for complaints of allergic reaction. Per EMS patient presents with a PMH of  Asthma, Bipolar disorder, Diabetes, Endometriosis, Factor V Leiden, GERD (gastroesophageal reflux disease), History of DVT in adulthood RLE on Eliquis, HTN (hypertension) , Hypothyroid, and Insomnia. Per EMS the patient has multiple known allergies but endorsed around 1600 today she ingested Pineapple juice and later on that night began to present with throat itchiness/ Throat swelling, patient reported. Upon assessment patient presents with no signs of respiratory distress and O2 SAT noted to be 98% on RA. Patient has no rash or signs of erythema @ this time. Pt denies chest pain, palpitations, shortness of breath, headache, visual disturbances, numbness/tingling, fever, chills, diaphoresis,  nausea, vomiting, constipation, diarrhea, or urinary symptoms. She is ambulatory up @ christine with a steady gait. EMS endorsed patient recently was discharged and noted to present with SI/HI, but patient denies SI/HI Idealizations @ this time. Safety and comfort measures provided, bed locked and in lowest position, side rails up for safety. Call bell within reach. Awaiting medications from Pharmacy.

## 2024-07-21 NOTE — ED PROVIDER NOTE - PATIENT PORTAL LINK FT
You can access the FollowMyHealth Patient Portal offered by [facility] by registering at the following website: [email or web address]. By joining [employer] [employer]’s FollowMyHealth portal, you will also be able to view your health information using other applications (apps) compatible with our system. You can access the FollowMyHealth Patient Portal offered by [facility] by registering at the following website: [email or web address]. By joining [employer]’s FollowMyHealth portal, you will also be able to view your health information using other applications (apps) compatible with our system.

## 2024-07-21 NOTE — CONSULT NOTE ADULT - SUBJECTIVE AND OBJECTIVE BOX
CC: Throat tightness    HPI: 39-year-old female with PMH of asthma, bipolar disorder, various personality disorders, diabetes, endometriosis, factor V Leyden, GERD, hypertension, epilepsy presents to the ED with complaints of allergic reaction.  Patient presents via EMS from her group home where she states about an hour ago she drank some fruit juice that had pineapple in it.  Patient is allergic to pineapple.  Patient has never had anaphylaxis, but states whenever she drinks or eats pineapple she gets an itchy mouth and throat.  Patient took 1 sip.  Started to feel itchiness in her throat and felt like her throat was getting tight.  Patient endorsing some nausea, but no difficulty breathing, shortness of breath, hoarseness, sore throat, chest pain, urticaria, rash, angioedema, neck masses or swelling.        PAST MEDICAL & SURGICAL HISTORY:  Asthma      Seizure      Factor V Leiden      Bipolar disorder      Endometriosis      History of DVT in adulthood  RLE on eliquis      Hypothyroid      Seasonal allergies      Insomnia      GERD (gastroesophageal reflux disease)      HTN (hypertension)      Diabetes      No significant past surgical history        Allergies    [This allergen will not trigger allergy alert] Toradol SOLN (Unknown)  Zofran (Hives)  Toradol (Hives)  Zofran (Unknown)  penicillin (Hives)  TraMADol Hydrochloride (Unknown)  TraZODone Hydrochloride (Unknown)  trazodone (Other)  gabapentin (Unknown)  [This allergen will not trigger allergy alert] pineapple allergenic extract (Swelling)  Toradol (Unknown)  Zofran (Rash)  penicillin (Other)  latex (Blisters)    Intolerances      MEDICATIONS  (STANDING):    MEDICATIONS  (PRN):      Social History: unknown    Family history: no pertinent FHx    ROS:   ENT: all negative except as noted in HPI   CV: denies palpitations  Pulm: denies SOB, cough, hemoptysis  GI: denies change in apetite, indigestion, n/v  : denies pertinent urinary symptoms, urgency  Neuro: denies numbness/tingling, loss of sensation  Psych: denies anxiety  MS: denies muscle weakness, instability  Heme: denies easy bruising or bleeding  Endo: denies heat/cold intolerance, excessive sweating  Vascular: denies LE edema    Vital Signs Last 24 Hrs  T(C): 36.8 (22 Jul 2024 00:45), Max: 36.8 (22 Jul 2024 00:45)  T(F): 98.3 (22 Jul 2024 00:45), Max: 98.3 (22 Jul 2024 00:45)  HR: 112 (22 Jul 2024 00:45) (100 - 112)  BP: 134/88 (22 Jul 2024 00:45) (98/68 - 134/88)  BP(mean): 97 (22 Jul 2024 00:45) (91 - 97)  RR: 16 (22 Jul 2024 00:45) (16 - 17)  SpO2: 97% (22 Jul 2024 00:45) (97% - 100%)    Parameters below as of 22 Jul 2024 00:45  Patient On (Oxygen Delivery Method): room air                  PHYSICAL EXAM:  Gen: NAD  Skin: No rashes, bruises, or lesions  Head: Normocephalic, Atraumatic  Face: no edema, erythema, or fluctuance. Parotid glands soft without mass  Eyes: no scleral injection  Nose: Nares bilaterally patent, no discharge  Mouth: No Stridor / Drooling / Trismus.  Mucosa moist, tongue/uvula midline, oropharynx clear  Neck: Flat, supple, no lymphadenopathy, trachea midline, no masses  Lymphatic: No lymphadenopathy  Resp: breathing easily, no stridor  CV: no peripheral edema/cyanosis  GI: nondistended   Peripheral vascular: no JVD or edema  Neuro: facial nerve intact, no facial droop      Fiberoptic Indirect laryngoscopy:  (Scope #2 used)    Reason for Laryngoscopy:    Patient was unable to cooperate with mirror.  Nasopharynx, oropharynx, and hypopharynx clear, no bleeding. Tongue base, posterior pharyngeal wall, vallecula, epiglottis, and subglottis appear normal. No erythema, edema, pooling of secretions, masses or lesions. Airway patent, no foreign body visualized. No glottic/supraglottic edema. True vocal cords, arytenoids, vestibular folds, ventricles, pyriform sinuses, and aryepiglottic folds appear normal bilaterally. Vocal cords mobile with good contact b/l.

## 2024-07-21 NOTE — ED PROVIDER NOTE - NSFOLLOWUPINSTRUCTIONS_ED_ALL_ED_FT
1. You presented to the emergency department for mild allergic reaction.     2. Your evaluation in the emergency department included a physician evaluation. Your work-up did not reveal any findings indicating the need for admission to the hospital or any emergent interventions at this time.     3. It is recommended that you follow-up with your primary care provider for a repeat evaluation, and potentially further testing and treatment.     If needed, to arrange an appointment with a primary care provider please call: [phone]    4. Please continue taking your regular medications as prescribed.     5. PLEASE RETURN TO THE EMERGENCY DEPARTMENT IMMEDIATELY IF you develop any fevers not responding to over the counter medications, uncontrollable nausea and vomiting, an inability to tolerate eating and drinking, difficulty breathing, chest pain, a severe increase in your symptoms or pain, or any other new symptoms that concern you.

## 2024-07-21 NOTE — ED ADULT NURSE NOTE - BIRTH SEX
Female
Bed in low position, brakes on/Call light is within reach, educate patient/family on its functionality/Environment clear of unused equipment, furniture's in place, clear of hazards/Orientation to room

## 2024-07-22 VITALS — DIASTOLIC BLOOD PRESSURE: 74 MMHG | HEART RATE: 79 BPM | SYSTOLIC BLOOD PRESSURE: 112 MMHG | TEMPERATURE: 98 F

## 2024-07-22 DIAGNOSIS — R09.89 OTHER SPECIFIED SYMPTOMS AND SIGNS INVOLVING THE CIRCULATORY AND RESPIRATORY SYSTEMS: ICD-10-CM

## 2024-07-22 DIAGNOSIS — F25.9 SCHIZOAFFECTIVE DISORDER, UNSPECIFIED: ICD-10-CM

## 2024-07-22 LAB
ALBUMIN SERPL ELPH-MCNC: 4.3 G/DL — SIGNIFICANT CHANGE UP (ref 3.3–5)
ALP SERPL-CCNC: 70 U/L — SIGNIFICANT CHANGE UP (ref 40–120)
ALT FLD-CCNC: 68 U/L — HIGH (ref 10–45)
AMPHET UR-MCNC: NEGATIVE — SIGNIFICANT CHANGE UP
ANION GAP SERPL CALC-SCNC: 15 MMOL/L — SIGNIFICANT CHANGE UP (ref 5–17)
APAP SERPL-MCNC: <15 UG/ML — SIGNIFICANT CHANGE UP (ref 10–30)
APPEARANCE UR: ABNORMAL
AST SERPL-CCNC: 28 U/L — SIGNIFICANT CHANGE UP (ref 10–40)
BACTERIA # UR AUTO: ABNORMAL /HPF
BARBITURATES UR SCN-MCNC: NEGATIVE — SIGNIFICANT CHANGE UP
BASOPHILS # BLD AUTO: 0.02 K/UL — SIGNIFICANT CHANGE UP (ref 0–0.2)
BASOPHILS NFR BLD AUTO: 0.3 % — SIGNIFICANT CHANGE UP (ref 0–2)
BENZODIAZ UR-MCNC: NEGATIVE — SIGNIFICANT CHANGE UP
BILIRUB SERPL-MCNC: 0.3 MG/DL — SIGNIFICANT CHANGE UP (ref 0.2–1.2)
BILIRUB UR-MCNC: NEGATIVE — SIGNIFICANT CHANGE UP
BUN SERPL-MCNC: 11 MG/DL — SIGNIFICANT CHANGE UP (ref 7–23)
CALCIUM SERPL-MCNC: 10.1 MG/DL — SIGNIFICANT CHANGE UP (ref 8.4–10.5)
CAST: 0 /LPF — SIGNIFICANT CHANGE UP (ref 0–4)
CHLORIDE SERPL-SCNC: 102 MMOL/L — SIGNIFICANT CHANGE UP (ref 96–108)
CO2 SERPL-SCNC: 19 MMOL/L — LOW (ref 22–31)
COCAINE METAB.OTHER UR-MCNC: NEGATIVE — SIGNIFICANT CHANGE UP
COLOR SPEC: SIGNIFICANT CHANGE UP
CREAT SERPL-MCNC: 0.85 MG/DL — SIGNIFICANT CHANGE UP (ref 0.5–1.3)
DIFF PNL FLD: ABNORMAL
EGFR: 90 ML/MIN/1.73M2 — SIGNIFICANT CHANGE UP
EOSINOPHIL # BLD AUTO: 0 K/UL — SIGNIFICANT CHANGE UP (ref 0–0.5)
EOSINOPHIL NFR BLD AUTO: 0 % — SIGNIFICANT CHANGE UP (ref 0–6)
ETHANOL SERPL-MCNC: <10 MG/DL — SIGNIFICANT CHANGE UP (ref 0–10)
FLUAV AG NPH QL: SIGNIFICANT CHANGE UP
FLUBV AG NPH QL: SIGNIFICANT CHANGE UP
GLUCOSE SERPL-MCNC: 172 MG/DL — HIGH (ref 70–99)
GLUCOSE UR QL: NEGATIVE MG/DL — SIGNIFICANT CHANGE UP
HCT VFR BLD CALC: 35.2 % — SIGNIFICANT CHANGE UP (ref 34.5–45)
HGB BLD-MCNC: 11.6 G/DL — SIGNIFICANT CHANGE UP (ref 11.5–15.5)
IMM GRANULOCYTES NFR BLD AUTO: 2 % — HIGH (ref 0–0.9)
KETONES UR-MCNC: 15 MG/DL
LEUKOCYTE ESTERASE UR-ACNC: NEGATIVE — SIGNIFICANT CHANGE UP
LYMPHOCYTES # BLD AUTO: 1.11 K/UL — SIGNIFICANT CHANGE UP (ref 1–3.3)
LYMPHOCYTES # BLD AUTO: 17.2 % — SIGNIFICANT CHANGE UP (ref 13–44)
MCHC RBC-ENTMCNC: 31.6 PG — SIGNIFICANT CHANGE UP (ref 27–34)
MCHC RBC-ENTMCNC: 33 GM/DL — SIGNIFICANT CHANGE UP (ref 32–36)
MCV RBC AUTO: 95.9 FL — SIGNIFICANT CHANGE UP (ref 80–100)
METHADONE UR-MCNC: NEGATIVE — SIGNIFICANT CHANGE UP
MONOCYTES # BLD AUTO: 0.24 K/UL — SIGNIFICANT CHANGE UP (ref 0–0.9)
MONOCYTES NFR BLD AUTO: 3.7 % — SIGNIFICANT CHANGE UP (ref 2–14)
NEUTROPHILS # BLD AUTO: 4.97 K/UL — SIGNIFICANT CHANGE UP (ref 1.8–7.4)
NEUTROPHILS NFR BLD AUTO: 76.8 % — SIGNIFICANT CHANGE UP (ref 43–77)
NITRITE UR-MCNC: NEGATIVE — SIGNIFICANT CHANGE UP
NRBC # BLD: 0 /100 WBCS — SIGNIFICANT CHANGE UP (ref 0–0)
OPIATES UR-MCNC: NEGATIVE — SIGNIFICANT CHANGE UP
OXYCODONE UR-MCNC: NEGATIVE — SIGNIFICANT CHANGE UP
PCP SPEC-MCNC: SIGNIFICANT CHANGE UP
PCP UR-MCNC: NEGATIVE — SIGNIFICANT CHANGE UP
PH UR: 6 — SIGNIFICANT CHANGE UP (ref 5–8)
PLATELET # BLD AUTO: 112 K/UL — LOW (ref 150–400)
POTASSIUM SERPL-MCNC: 4.7 MMOL/L — SIGNIFICANT CHANGE UP (ref 3.5–5.3)
POTASSIUM SERPL-SCNC: 4.7 MMOL/L — SIGNIFICANT CHANGE UP (ref 3.5–5.3)
PROT SERPL-MCNC: 7.5 G/DL — SIGNIFICANT CHANGE UP (ref 6–8.3)
PROT UR-MCNC: SIGNIFICANT CHANGE UP MG/DL
RBC # BLD: 3.67 M/UL — LOW (ref 3.8–5.2)
RBC # FLD: 16.1 % — HIGH (ref 10.3–14.5)
RBC CASTS # UR COMP ASSIST: 2 /HPF — SIGNIFICANT CHANGE UP (ref 0–4)
RSV RNA NPH QL NAA+NON-PROBE: SIGNIFICANT CHANGE UP
SALICYLATES SERPL-MCNC: <2 MG/DL — LOW (ref 15–30)
SARS-COV-2 RNA SPEC QL NAA+PROBE: SIGNIFICANT CHANGE UP
SODIUM SERPL-SCNC: 136 MMOL/L — SIGNIFICANT CHANGE UP (ref 135–145)
SP GR SPEC: >1.03 — HIGH (ref 1–1.03)
SQUAMOUS # UR AUTO: 13 /HPF — HIGH (ref 0–5)
THC UR QL: NEGATIVE — SIGNIFICANT CHANGE UP
UROBILINOGEN FLD QL: 1 MG/DL — SIGNIFICANT CHANGE UP (ref 0.2–1)
WBC # BLD: 6.47 K/UL — SIGNIFICANT CHANGE UP (ref 3.8–10.5)
WBC # FLD AUTO: 6.47 K/UL — SIGNIFICANT CHANGE UP (ref 3.8–10.5)
WBC UR QL: 2 /HPF — SIGNIFICANT CHANGE UP (ref 0–5)

## 2024-07-22 PROCEDURE — 31505 DIAGNOSTIC LARYNGOSCOPY: CPT

## 2024-07-22 PROCEDURE — 80053 COMPREHEN METABOLIC PANEL: CPT

## 2024-07-22 PROCEDURE — 85025 COMPLETE CBC W/AUTO DIFF WBC: CPT

## 2024-07-22 PROCEDURE — 96372 THER/PROPH/DIAG INJ SC/IM: CPT | Mod: XU

## 2024-07-22 PROCEDURE — 93005 ELECTROCARDIOGRAM TRACING: CPT | Mod: XU

## 2024-07-22 PROCEDURE — 96365 THER/PROPH/DIAG IV INF INIT: CPT | Mod: XU

## 2024-07-22 PROCEDURE — 80307 DRUG TEST PRSMV CHEM ANLYZR: CPT

## 2024-07-22 PROCEDURE — 81001 URINALYSIS AUTO W/SCOPE: CPT

## 2024-07-22 PROCEDURE — 96375 TX/PRO/DX INJ NEW DRUG ADDON: CPT | Mod: XU

## 2024-07-22 PROCEDURE — 99285 EMERGENCY DEPT VISIT HI MDM: CPT | Mod: 25

## 2024-07-22 PROCEDURE — 87637 SARSCOV2&INF A&B&RSV AMP PRB: CPT

## 2024-07-22 RX ORDER — LORAZEPAM 0.5 MG
2 TABLET ORAL ONCE
Refills: 0 | Status: DISCONTINUED | OUTPATIENT
Start: 2024-07-22 | End: 2024-07-22

## 2024-07-22 RX ORDER — HALOPERIDOL DECANOATE 100 MG/ML
5 VIAL (ML) INTRAMUSCULAR ONCE
Refills: 0 | Status: COMPLETED | OUTPATIENT
Start: 2024-07-22 | End: 2024-07-22

## 2024-07-22 RX ORDER — DIPHENHYDRAMINE HCL 12.5MG/5ML
25 ELIXIR ORAL ONCE
Refills: 0 | Status: COMPLETED | OUTPATIENT
Start: 2024-07-22 | End: 2024-07-22

## 2024-07-22 RX ADMIN — Medication 25 MILLIGRAM(S): at 02:12

## 2024-07-22 RX ADMIN — Medication 2 MILLIGRAM(S): at 02:49

## 2024-07-22 RX ADMIN — Medication 5 MILLIGRAM(S): at 02:49

## 2024-07-22 RX ADMIN — Medication 2 MILLIGRAM(S): at 03:19

## 2024-07-22 NOTE — ED ADULT NURSE REASSESSMENT NOTE - NS ED NURSE REASSESS COMMENT FT1
RN notified Carmine Cornell called on patient @ 0210. Per ED staff patient was seen taking a butterfly needle from the nursing station and endorsed " She is going to stab herself". ED Team and security @ beside to encourage patient to cooperate and return unopened Butterfly Needle. Patient initially uncooperative after multiple request to return item. Patient adamantly endorsed she is behaving this way due to receiving PO Benadryl over IV Push Benadryl/ Per ED MD Team patient may have PO Benadryl. Butterfly needle returned and multiple security and [facility] staff @ bedside. ED Team @ this time has noted the patient provided an incorrect address holding up her Discharge via Medicaid Taxi @ this time.
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
## 2024-07-22 NOTE — ED BEHAVIORAL HEALTH ASSESSMENT NOTE - DETAILS
If patient endorses SI/ self-harm , patient should contact 911 and come to the ED Per chart review Toradol N/A ED aware of plan
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
## 2024-07-22 NOTE — ED ADULT NURSE REASSESSMENT NOTE - NS ED NURSE REASSESS COMMENT FT1
Security and [employer] Team @ patients bedside. Patient notably not following instructions and uncooperative with care. Charge phoned @ [phone] and made aware of patients presenting status and now made aware in the few minutes RN walked away to phone charge that the patient attempted to elope with Security and [employer] team following behind patient within arms reach. Per ED MD Elias the patient is to not be Discharged and left in Triage @ this time. Patient redirected back to bedside @ this time.

## 2024-07-22 NOTE — ED BEHAVIORAL HEALTH ASSESSMENT NOTE - NSBHMSEJUDGE_PSY_A_CORE
Detail Level: Simple Additional Notes: Pt states lesion has been present for years and occurred after surgical incision to the area. Prior medical provider have examined it an attempted to excised again. Pt uses OTC antibiotic cream to the area. Poor
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
## 2024-07-22 NOTE — CHART NOTE - NSCHARTNOTEFT_GEN_A_CORE
EMERGENCY : SW consulted by psychiatry as patient psychiatrically cleared for discharge back to group home. As per ED MD, patient also medically cleared for d/c back to group home. Social work consulted for transportation arrangements for discharge. LCSW reviewed patient's chart. As per chart review patient is a "39-year-old female with PMH of asthma, bipolar disorder, various personality disorders, diabetes, endometriosis, factor V Leyden, GERD, hypertension, epilepsy presents to the ED with complaints of allergic reaction." Psychiatry spoke with  Di Can who is aware patient is cleared for d/c back to group home. LCSW contacted [facility] PH: [phone] and spoke with staff member Fredis who is aware patient is cleared for d/c. Confirms patient resides there and confirms group home address of [address] [place] [address]. She states patient travels via ambulance transportation and states patient may return at anytime. LCSW made ED MD and RN aware of above, ED MD states transport can be arranged at this time. LCSW completed nonemergent and booked ambulance for d/c with RN with [facility]. Patient made aware as well. Social work continues to remain available as needed.

## 2024-07-22 NOTE — ED ADULT NURSE REASSESSMENT NOTE - NS ED NURSE REASSESS COMMENT FT1
RN @ Bedside to find patient banging her head against the wall, not able to be redirected by RN @ this time. ED MD Leggett made aware and awaiting Medicaid Taxi for patient @ this time.

## 2024-07-22 NOTE — ED ADULT NURSE REASSESSMENT NOTE - NS ED NURSE REASSESS COMMENT FT1
Patients valuables dropped in the safe with ID [id number]. Also patient belongings packed up and sent with security @ this time.
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
## 2024-07-22 NOTE — ED BEHAVIORAL HEALTH ASSESSMENT NOTE - DESCRIPTION
Lives at [facility] As per HPI Patient BIBEMS after endorsing an itchy throat after she drank some fruit juice that had pineapple mixed in. Per ED note "Patient endorsing some nausea, but no difficulty breathing, shortness of breath, chest pain, abdominal pain, vomiting, diarrhea, constipation, urticaria, rash, angioedema, neck masses or swelling. No SI/HI/hallucinations." Patient received Benadryl 25 mg IV for allergic reaction. Per ED, while waiting for medicaid taxi when patient was cleared to d/c, patient starting to become belligerent and making self-harm comments.

## 2024-07-22 NOTE — ED BEHAVIORAL HEALTH ASSESSMENT NOTE - NSBHSACONSEQUENCE_PSY_A_CORE FT
Per chart review, patient has previous substance abuse for the substances above. Patient denied substance use in ED and urine tox clear

## 2024-07-22 NOTE — ED BEHAVIORAL HEALTH ASSESSMENT NOTE - CURRENT MEDICATION
Per ED Note:  - Albuterol (Eqv-ProAir HFA) 90 mcg/inh inhalation aerosol: 2 puff(s) inhaled every 6 hours   - predniSONE 10 mg oral tablet: 1 tab(s) orally once a day   - Macrobid 100 mg oral capsule: 1 cap(s) orally 2 times a day  - methocarbamol 500 mg oral tablet: 1 tab(s) orally 3 times a day   - mometasone 220 mcg/inh inhalation aerosol powder: 2 puff(s) inhaled once a day  - LORazepam 0.5 mg oral tablet: 1 tab(s) orally every 12 hours, As needed, Anxiety MDD:1 mg  - levothyroxine 75 mcg (0.075 mg) oral tablet: 1 tab(s) orally once a day  - pantoprazole 40 mg oral delayed release tablet: 1 tab(s) orally once a day  - fluticasone 50 mcg/inh nasal spray: 1 spray(s) nasal 2 times a day  - methocarbamol 500 mg oral tablet: 1 tab(s) orally 3 times a day, As needed, Muscle Spasm  - albuterol 90 mcg/inh inhalation aerosol: 2 puff(s) inhaled every 6 hours, As needed, Shortness of Breath and/or Wheezing  - QUEtiapine 200 mg oral tablet: 1 tab(s) orally once a day   - QUEtiapine 50 mg oral tablet: 1 tab(s) orally once a day   - chlorproMAZINE 50 mg oral tablet: 1 tab(s) orally every 8 hours  - Trileptal 150 mg oral tablet: 1 tab(s) orally 2 times a day MDD:300mg  - apixaban 5 mg oral tablet: 1 tab(s) orally every 12 hours  - risperiDONE 3 mg oral tablet: 1 orally once a day (at bedtime)  - melatonin 1.5 mg oral tablet, chewable: 2 orally once a day (at bedtime)  - Eliquis 5 mg oral tablet: 1 orally once a day (at bedtime)  - cloNIDine 0.1 mg oral tablet: 1 orally once a day (at bedtime)  - Robaxin 500 mg oral tablet: 2 orally once a day (at bedtime)

## 2024-07-22 NOTE — ED ADULT NURSE REASSESSMENT NOTE - NS ED NURSE REASSESS COMMENT FT1
Per Security and [employer] Team @ bedside the patient is maintaining being uncooperative with care. She has also began to bang her head against the walls outside of the ED Teams advise, and now states and begs " Security to shoot her". ALONZO Elias made aware and per Team Discharge to be reversed and patient to be medicated @ this time.

## 2024-07-23 ENCOUNTER — APPOINTMENT (OUTPATIENT)
Dept: CARDIOLOGY | Facility: CLINIC | Age: 39
End: 2024-07-23

## 2024-07-24 ENCOUNTER — APPOINTMENT (OUTPATIENT)
Dept: NEUROLOGY | Facility: CLINIC | Age: 39
End: 2024-07-24

## 2024-07-24 ENCOUNTER — NON-APPOINTMENT (OUTPATIENT)
Age: 39
End: 2024-07-24

## 2024-07-30 ENCOUNTER — NON-APPOINTMENT (OUTPATIENT)
Age: 39
End: 2024-07-30

## 2024-07-31 ENCOUNTER — NON-APPOINTMENT (OUTPATIENT)
Age: 39
End: 2024-07-31

## 2024-07-31 ENCOUNTER — EMERGENCY (EMERGENCY)
Facility: HOSPITAL | Age: 39
LOS: 1 days | Discharge: HOME | End: 2024-07-31
Attending: EMERGENCY MEDICINE | Admitting: EMERGENCY MEDICINE
Payer: MEDICARE

## 2024-07-31 VITALS
TEMPERATURE: 98 F | SYSTOLIC BLOOD PRESSURE: 110 MMHG | OXYGEN SATURATION: 99 % | HEART RATE: 105 BPM | DIASTOLIC BLOOD PRESSURE: 80 MMHG | HEIGHT: 68 IN | RESPIRATION RATE: 16 BRPM | WEIGHT: 220.02 LBS

## 2024-07-31 VITALS — HEART RATE: 84 BPM

## 2024-07-31 LAB
ALBUMIN SERPL ELPH-MCNC: 4.4 G/DL — SIGNIFICANT CHANGE UP (ref 3.3–5)
ALP SERPL-CCNC: 73 U/L — SIGNIFICANT CHANGE UP (ref 40–120)
ALT FLD-CCNC: 89 U/L — HIGH (ref 4–33)
ANION GAP SERPL CALC-SCNC: 10 MMOL/L — SIGNIFICANT CHANGE UP (ref 7–14)
APPEARANCE UR: ABNORMAL
APTT BLD: 31.4 SEC — SIGNIFICANT CHANGE UP (ref 24.5–35.6)
AST SERPL-CCNC: 40 U/L — HIGH (ref 4–32)
BACTERIA # UR AUTO: ABNORMAL /HPF
BASOPHILS # BLD AUTO: 0.03 K/UL — SIGNIFICANT CHANGE UP (ref 0–0.2)
BASOPHILS NFR BLD AUTO: 0.5 % — SIGNIFICANT CHANGE UP (ref 0–2)
BILIRUB SERPL-MCNC: 0.2 MG/DL — SIGNIFICANT CHANGE UP (ref 0.2–1.2)
BILIRUB UR-MCNC: NEGATIVE — SIGNIFICANT CHANGE UP
BUN SERPL-MCNC: 14 MG/DL — SIGNIFICANT CHANGE UP (ref 7–23)
CALCIUM SERPL-MCNC: 9.3 MG/DL — SIGNIFICANT CHANGE UP (ref 8.4–10.5)
CAST: 4 /LPF — SIGNIFICANT CHANGE UP (ref 0–4)
CHLORIDE SERPL-SCNC: 106 MMOL/L — SIGNIFICANT CHANGE UP (ref 98–107)
CO2 SERPL-SCNC: 22 MMOL/L — SIGNIFICANT CHANGE UP (ref 22–31)
COLOR SPEC: SIGNIFICANT CHANGE UP
CREAT SERPL-MCNC: 0.81 MG/DL — SIGNIFICANT CHANGE UP (ref 0.5–1.3)
DIFF PNL FLD: NEGATIVE — SIGNIFICANT CHANGE UP
EGFR: 96 ML/MIN/1.73M2 — SIGNIFICANT CHANGE UP
EOSINOPHIL # BLD AUTO: 0.02 K/UL — SIGNIFICANT CHANGE UP (ref 0–0.5)
EOSINOPHIL NFR BLD AUTO: 0.3 % — SIGNIFICANT CHANGE UP (ref 0–6)
GLUCOSE SERPL-MCNC: 86 MG/DL — SIGNIFICANT CHANGE UP (ref 70–99)
GLUCOSE UR QL: NEGATIVE MG/DL — SIGNIFICANT CHANGE UP
HCT VFR BLD CALC: 32.9 % — LOW (ref 34.5–45)
HGB BLD-MCNC: 10.8 G/DL — LOW (ref 11.5–15.5)
IANC: 3.25 K/UL — SIGNIFICANT CHANGE UP (ref 1.8–7.4)
IMM GRANULOCYTES NFR BLD AUTO: 0.6 % — SIGNIFICANT CHANGE UP (ref 0–0.9)
INR BLD: 1.03 RATIO — SIGNIFICANT CHANGE UP (ref 0.85–1.18)
KETONES UR-MCNC: ABNORMAL MG/DL
LEUKOCYTE ESTERASE UR-ACNC: NEGATIVE — SIGNIFICANT CHANGE UP
LYMPHOCYTES # BLD AUTO: 2.43 K/UL — SIGNIFICANT CHANGE UP (ref 1–3.3)
LYMPHOCYTES # BLD AUTO: 38 % — SIGNIFICANT CHANGE UP (ref 13–44)
MCHC RBC-ENTMCNC: 31.3 PG — SIGNIFICANT CHANGE UP (ref 27–34)
MCHC RBC-ENTMCNC: 32.8 GM/DL — SIGNIFICANT CHANGE UP (ref 32–36)
MCV RBC AUTO: 95.4 FL — SIGNIFICANT CHANGE UP (ref 80–100)
MONOCYTES # BLD AUTO: 0.63 K/UL — SIGNIFICANT CHANGE UP (ref 0–0.9)
MONOCYTES NFR BLD AUTO: 9.8 % — SIGNIFICANT CHANGE UP (ref 2–14)
NEUTROPHILS # BLD AUTO: 3.25 K/UL — SIGNIFICANT CHANGE UP (ref 1.8–7.4)
NEUTROPHILS NFR BLD AUTO: 50.8 % — SIGNIFICANT CHANGE UP (ref 43–77)
NITRITE UR-MCNC: NEGATIVE — SIGNIFICANT CHANGE UP
NRBC # BLD: 0 /100 WBCS — SIGNIFICANT CHANGE UP (ref 0–0)
NRBC # FLD: 0 K/UL — SIGNIFICANT CHANGE UP (ref 0–0)
PH UR: 6 — SIGNIFICANT CHANGE UP (ref 5–8)
PLATELET # BLD AUTO: 162 K/UL — SIGNIFICANT CHANGE UP (ref 150–400)
POTASSIUM SERPL-MCNC: 3.9 MMOL/L — SIGNIFICANT CHANGE UP (ref 3.5–5.3)
POTASSIUM SERPL-SCNC: 3.9 MMOL/L — SIGNIFICANT CHANGE UP (ref 3.5–5.3)
PROT SERPL-MCNC: 7.7 G/DL — SIGNIFICANT CHANGE UP (ref 6–8.3)
PROT UR-MCNC: SIGNIFICANT CHANGE UP MG/DL
PROTHROM AB SERPL-ACNC: 11.6 SEC — SIGNIFICANT CHANGE UP (ref 9.5–13)
RBC # BLD: 3.45 M/UL — LOW (ref 3.8–5.2)
RBC # FLD: 16.5 % — HIGH (ref 10.3–14.5)
RBC CASTS # UR COMP ASSIST: 34 /HPF — HIGH (ref 0–4)
REVIEW: SIGNIFICANT CHANGE UP
SODIUM SERPL-SCNC: 138 MMOL/L — SIGNIFICANT CHANGE UP (ref 135–145)
SP GR SPEC: 1.03 — HIGH (ref 1–1.03)
SQUAMOUS # UR AUTO: 17 /HPF — HIGH (ref 0–5)
TROPONIN T, HIGH SENSITIVITY RESULT: <6 NG/L — SIGNIFICANT CHANGE UP
UROBILINOGEN FLD QL: 0.2 MG/DL — SIGNIFICANT CHANGE UP (ref 0.2–1)
WBC # BLD: 6.4 K/UL — SIGNIFICANT CHANGE UP (ref 3.8–10.5)
WBC # FLD AUTO: 6.4 K/UL — SIGNIFICANT CHANGE UP (ref 3.8–10.5)
WBC UR QL: 4 /HPF — SIGNIFICANT CHANGE UP (ref 0–5)
YEAST-LIKE CELLS: PRESENT

## 2024-07-31 PROCEDURE — 93010 ELECTROCARDIOGRAM REPORT: CPT

## 2024-07-31 PROCEDURE — 71275 CT ANGIOGRAPHY CHEST: CPT | Mod: 26,MC

## 2024-07-31 PROCEDURE — 99285 EMERGENCY DEPT VISIT HI MDM: CPT

## 2024-07-31 PROCEDURE — 71046 X-RAY EXAM CHEST 2 VIEWS: CPT | Mod: 26

## 2024-07-31 RX ORDER — DIPHENHYDRAMINE HCL 12.5MG/5ML
50 ELIXIR ORAL ONCE
Refills: 0 | Status: COMPLETED | OUTPATIENT
Start: 2024-07-31 | End: 2024-07-31

## 2024-07-31 RX ORDER — METHOCARBAMOL 500 MG
750 TABLET ORAL ONCE
Refills: 0 | Status: COMPLETED | OUTPATIENT
Start: 2024-07-31 | End: 2024-07-31

## 2024-07-31 RX ORDER — DIPHENHYDRAMINE HCL 12.5MG/5ML
25 ELIXIR ORAL ONCE
Refills: 0 | Status: DISCONTINUED | OUTPATIENT
Start: 2024-07-31 | End: 2024-07-31

## 2024-07-31 RX ORDER — FLUCONAZOLE 200 MG
150 TABLET ORAL ONCE
Refills: 0 | Status: COMPLETED | OUTPATIENT
Start: 2024-07-31 | End: 2024-07-31

## 2024-07-31 RX ORDER — SODIUM CHLORIDE 0.9 % (FLUSH) 0.9 %
1000 SYRINGE (ML) INJECTION ONCE
Refills: 0 | Status: COMPLETED | OUTPATIENT
Start: 2024-07-31 | End: 2024-07-31

## 2024-07-31 RX ADMIN — Medication 750 MILLIGRAM(S): at 15:57

## 2024-07-31 RX ADMIN — Medication 150 MILLIGRAM(S): at 18:57

## 2024-07-31 RX ADMIN — Medication 50 MILLIGRAM(S): at 18:30

## 2024-07-31 RX ADMIN — Medication 1000 MILLILITER(S): at 14:35

## 2024-07-31 NOTE — ED ADULT NURSE NOTE - OBJECTIVE STATEMENT
pt came to ED c/o chest pain, shortness of breath, "itchy all over" and feeling "fatigued". Pt denies headache, dizziness, any visual complaints. Breathing is spontaneous and unlabored. Pt sts she feels "comfortable" laying in the stretcher at this time on 3LPM O2 NC. ED providers evaluated pt at bedside. JORGE L Weaver placed US guided 20g PIV due to failed IV placement.

## 2024-07-31 NOTE — ED PROVIDER NOTE - PROGRESS NOTE DETAILS
JORGE L Prajapati- Pt feeling better after ER stay, CTA neg for PE, UA showing yeast will treat with diflucan, will send UCX, stable for dc.

## 2024-07-31 NOTE — ED PROVIDER NOTE - PATIENT PORTAL LINK FT
You can access the FollowMyHealth Patient Portal offered by [facility] by registering at the following website: [email or web address]. By joining [facility]’s FollowMyHealth portal, you will also be able to view your health information using other applications (apps) compatible with our system.

## 2024-07-31 NOTE — ED PROVIDER NOTE - CLINICAL SUMMARY MEDICAL DECISION MAKING FREE TEXT BOX
40 y/o female pmh bipolar, factor V leiden, DVT/PE on xarelto, htn, dm c/o weakness and fatigue x 1 week with chest pain x2 days. Recent admission to [facility] last week for UTI. Pt is well appearing, nad, afebrile, mild tachycardia, no murmur, lungs cta b/l, abd soft nt nd, no pitting edema,- concern for recurrent UTI vs electrolyte abnormality vs PE vs PNA vs ACS- will check labs, CTA chest, IV fluids, reassess.

## 2024-07-31 NOTE — ED PROVIDER NOTE - ATTENDING APP SHARED VISIT CONTRIBUTION OF CARE
Gong: I have seen and examined the patient face to face, have reviewed and addended the HPI, PE and a/p as necessary.     38 yo F with schizophrenia, Factor  leiden, DVT/PE on xarelto, htn, dm a/w weakness and fatigue x 1 week with chest pain x2 days. Noted to recently admitted to [facility] last week for a UTI for 5 days and completed course of antibiotis.  Reports persistent c/o weakness, fatigue, decreased appetitie, dysuria and frequency. Pt reports now chest pain for 2 days. Denies sob, abd pain, n/v/d, hematuria, numbness, dizziness or syncope.  Reports missing AC x 2 over the last week    GEN - NAD; well appearing; A+O x3; non-toxic appearing  CARD -s1s2, RRR, no M,G,R;   PULM - CTA b/l, symmetric breath sounds;   ABD -  +BS, ND, NT, soft, no guarding, no rebound, no masses;   BACK - no CVA tenderness, Normal  spine;   EXT - symmetric pulses, 2+ dp, capillary refill < 2 seconds, no cyanosis, no edema;   NEURO - no focal neuro deficits, no slurred speech    Concern for possible PE given recent hospitalization and Factor V leiden and dvt/pe.  Will obtain ct angio.  R/o atypical acs with troponins. Follow up ekg.  check ua, cbc, cmp given persistent urinary symptoms.  Re-eval.

## 2024-07-31 NOTE — ED ADULT NURSE REASSESSMENT NOTE - NS ED NURSE REASSESS COMMENT FT1
JORGE L Weaver placed US guided 20g PIV in upper right arm. IV flushes without resistance and has positive blood return.

## 2024-07-31 NOTE — ED ADULT TRIAGE NOTE - CHIEF COMPLAINT QUOTE
Pt history of schizophrenia, DVT(on Eliquis), DM, HTN, presents for shortness of breath, chest discomfort, weakness, and fatigue, pt recently treated last week for uti, pt denies taking antibiotics at present, breathing even and unlabored on 3L NC.

## 2024-07-31 NOTE — ED PROVIDER NOTE - OBJECTIVE STATEMENT
38 y/o female pmh schizophrenia, Factor  leiden, DVT/PE on xarelto, htn, dm c/o weakness and fatigue x 1 week with cheest pain x2 days. Pt was admitted to [facility] last week for a UTI. Pt states she was admitted for 5 days and dc with no other meds. Since dc, still c/o weakness, fatigue, decreased appetitie, dysuria and frequency. Pt now also c/o chest pain for the last 2 days. Denies sob, abd pain, n/v/d, hematuria, numbness, dizziness or syncope.

## 2024-07-31 NOTE — ED ADULT NURSE NOTE - SKIN TEMPERATURE MOISTURE
[Home] : at home, [unfilled] , at the time of the visit. [Other Location: e.g. Home (Enter Location, City,State)___] : at [unfilled] [Verbal consent obtained from patient] : the patient, [unfilled] [de-identified] : This patient is Enrolled in the [employer]AR Program through Referral.IM\par Copied As per [facility] Discharge Summary\par \par \par "71 year old Male with a past medical history of renal transplant (on tacrolimus, unk date), complicated by low grade lymphoma post transplant, DM2, HTN, MGUS, pAF, BPH, HLD, CAD (s/p CABG 15 years ago), presents with fever, chills and worsening cough for 5 days. Patient states that he has a productive cough with brownish colored sputum. Found to have acute hypoxic respiratory failure and sepsis 2/2 likely multifocal pneumonia in the setting of known CLL/B cell lymphoma".  The patient was discharged in stable condition with follow up care.\par \par CN was not able to connect with the patient via TeleHealth due to technology challenges.  Telephonic follow up was conducted.  The patient denies chest pain, shortness of breath, palpitations, abdominal pain, nausea, vomiting, fever or chills.  The patient is able to speak in complete sentences and with no audible wheeze.\par \par  warm

## 2024-08-01 ENCOUNTER — APPOINTMENT (OUTPATIENT)
Dept: NEUROLOGY | Facility: CLINIC | Age: 39
End: 2024-08-01

## 2024-08-02 LAB
CULTURE RESULTS: ABNORMAL
SPECIMEN SOURCE: SIGNIFICANT CHANGE UP

## 2024-08-03 ENCOUNTER — INPATIENT (INPATIENT)
Facility: HOSPITAL | Age: 39
LOS: 2 days | Discharge: HOME | End: 2024-08-06
Attending: SURGERY | Admitting: SURGERY
Payer: MEDICARE

## 2024-08-03 VITALS
OXYGEN SATURATION: 99 % | HEIGHT: 68 IN | DIASTOLIC BLOOD PRESSURE: 82 MMHG | RESPIRATION RATE: 18 BRPM | TEMPERATURE: 98 F | HEART RATE: 90 BPM | SYSTOLIC BLOOD PRESSURE: 114 MMHG

## 2024-08-03 PROCEDURE — 99285 EMERGENCY DEPT VISIT HI MDM: CPT | Mod: 25

## 2024-08-03 PROCEDURE — 36410 VNPNXR 3YR/> PHY/QHP DX/THER: CPT

## 2024-08-03 PROCEDURE — 93010 ELECTROCARDIOGRAM REPORT: CPT

## 2024-08-03 NOTE — ED ADULT TRIAGE NOTE - CHIEF COMPLAINT QUOTE
Pt c/o right sided flank pain, headache and JOSE x1 day. Pt also noted to have swollen right hand with numbness to fingertips that started yesterday. Pt denies chest pain, dizziness. Phx of factor V deficiency, IBS, endometriosis, asthma, prediabetes. Pt c/o right sided flank pain, headache and JOSE x1 day. Pt also noted to have swollen right hand with numbness to fingertips that started yesterday. Pt denies chest pain, dizziness. Phx of factor V deficiency, IBS, endometriosis, asthma, prediabetes, bipolar disorder.

## 2024-08-03 NOTE — ED ADULT TRIAGE NOTE - PAIN: PRESENCE, MLM
DISCHARGE SUMMARY and INSTRUCTIONS:    You are being discharged undelivered because you and your baby are in stable condition.    STATUS NOTE:  Date:  8/2/2024  Time: 2115  Destination: Home    ACTIVITY:  As tolerated, No heavy lifting, and Frequent rest periods    DIET:  Continue to eat a healthy pregnancy diet  Be sure to drink 8-10 glasses of water a day  Don't go more than 8-10 hours without eating or drinking    CONTACT YOUR DOCTOR OR MIDWIFE IF YOU HAVE ANY OF THESE WARNING SIGNS OF PREGNANCY:  Sudden and/or constant pain  Vaginal bleeding  Sudden gush or leaking fluid from the vagina  Fainting  Headache that will not go away with your normal comfort measures  Any changes in your vision, such as blurry vision, double vision or spots/stars before your eyes  Severe nausea and vomiting  Little or no urine, pain and burning with urination, or blood in your urine  Chills or fever  Increase or change in vaginal discharge  Your baby moves less than usual (See Fetal Movement Counting below.)      FETAL MOVEMENT COUNTING:  One way you can know your baby is doing well during pregnancy is to pay attention to his or her movements.  We recommend counting your baby's movements once each day beginning in the third trimester, or about the 26th week of pregnancy.      How to count baby's movements:  Choose a time that is convenient for you when the baby is active, such as after a meal.  Try to do it at the same time each day.  Sit comfortably or lie on your side.  Note the time on the clock when you're ready to begin counting.  Count each movement until the baby has moved 10 times.   Count all movements that are either seen or felt, including shifting, rolling, or kicking.  Do not count baby's hiccoughs.  If you have counted 10 movements in less than 2 hours, resume your normal activities and count again tomorrow.    If it takes longer than 1 hour to count 4 movements, try these suggestions:  Eat something if you haven't eaten  within 2 to 3 hours before you count.  Try to lie down in a quiet, undisturbed location, on either your right or left side.  Place your hands on your belly and focus on your baby's movements.  Continue your count from where you left off before, until you feel 10 movements.  If it took longer than 2 hours to count 10 movements, call your midwife or doctor right away for recommendations.    Remember:  By counting and staying aware of your baby's movements, you will be helping your caregivers provide the best possible care for you and your baby.          A copy of this form was provided to and reviewed with Lindsay Haynes by Adenike COFFEY, 8/2/2024.  Patient verbalized understanding and has no further questions at this time.     complains of pain/discomfort

## 2024-08-04 ENCOUNTER — TRANSCRIPTION ENCOUNTER (OUTPATIENT)
Age: 39
End: 2024-08-04

## 2024-08-04 DIAGNOSIS — K35.80 UNSPECIFIED ACUTE APPENDICITIS: ICD-10-CM

## 2024-08-04 LAB
ALBUMIN SERPL ELPH-MCNC: 4 G/DL — SIGNIFICANT CHANGE UP (ref 3.3–5)
ALP SERPL-CCNC: 70 U/L — SIGNIFICANT CHANGE UP (ref 40–120)
ALT FLD-CCNC: 60 U/L — HIGH (ref 4–33)
ANION GAP SERPL CALC-SCNC: 12 MMOL/L — SIGNIFICANT CHANGE UP (ref 7–14)
APPEARANCE UR: CLEAR — SIGNIFICANT CHANGE UP
APTT BLD: 30.9 SEC — SIGNIFICANT CHANGE UP (ref 24.5–35.6)
AST SERPL-CCNC: 23 U/L — SIGNIFICANT CHANGE UP (ref 4–32)
BASE EXCESS BLDV CALC-SCNC: 1.7 MMOL/L — SIGNIFICANT CHANGE UP (ref -2–3)
BASOPHILS # BLD AUTO: 0.02 K/UL — SIGNIFICANT CHANGE UP (ref 0–0.2)
BASOPHILS NFR BLD AUTO: 0.3 % — SIGNIFICANT CHANGE UP (ref 0–2)
BILIRUB SERPL-MCNC: <0.2 MG/DL — SIGNIFICANT CHANGE UP (ref 0.2–1.2)
BILIRUB UR-MCNC: NEGATIVE — SIGNIFICANT CHANGE UP
BLD GP AB SCN SERPL QL: NEGATIVE — SIGNIFICANT CHANGE UP
BLOOD GAS VENOUS COMPREHENSIVE RESULT: SIGNIFICANT CHANGE UP
BUN SERPL-MCNC: 9 MG/DL — SIGNIFICANT CHANGE UP (ref 7–23)
CALCIUM SERPL-MCNC: 9.3 MG/DL — SIGNIFICANT CHANGE UP (ref 8.4–10.5)
CHLORIDE BLDV-SCNC: 107 MMOL/L — SIGNIFICANT CHANGE UP (ref 96–108)
CHLORIDE SERPL-SCNC: 105 MMOL/L — SIGNIFICANT CHANGE UP (ref 98–107)
CO2 BLDV-SCNC: 29.7 MMOL/L — HIGH (ref 22–26)
CO2 SERPL-SCNC: 24 MMOL/L — SIGNIFICANT CHANGE UP (ref 22–31)
COLOR SPEC: YELLOW — SIGNIFICANT CHANGE UP
CREAT SERPL-MCNC: 0.88 MG/DL — SIGNIFICANT CHANGE UP (ref 0.5–1.3)
DIFF PNL FLD: NEGATIVE — SIGNIFICANT CHANGE UP
EGFR: 87 ML/MIN/1.73M2 — SIGNIFICANT CHANGE UP
EOSINOPHIL # BLD AUTO: 0.04 K/UL — SIGNIFICANT CHANGE UP (ref 0–0.5)
EOSINOPHIL NFR BLD AUTO: 0.6 % — SIGNIFICANT CHANGE UP (ref 0–6)
GAS PNL BLDV: 138 MMOL/L — SIGNIFICANT CHANGE UP (ref 136–145)
GLUCOSE BLDC GLUCOMTR-MCNC: 108 MG/DL — HIGH (ref 70–99)
GLUCOSE BLDC GLUCOMTR-MCNC: 82 MG/DL — SIGNIFICANT CHANGE UP (ref 70–99)
GLUCOSE BLDC GLUCOMTR-MCNC: 92 MG/DL — SIGNIFICANT CHANGE UP (ref 70–99)
GLUCOSE BLDC GLUCOMTR-MCNC: 94 MG/DL — SIGNIFICANT CHANGE UP (ref 70–99)
GLUCOSE BLDV-MCNC: 91 MG/DL — SIGNIFICANT CHANGE UP (ref 70–99)
GLUCOSE SERPL-MCNC: 95 MG/DL — SIGNIFICANT CHANGE UP (ref 70–99)
GLUCOSE UR QL: NEGATIVE MG/DL — SIGNIFICANT CHANGE UP
HCG SERPL-ACNC: <1 MIU/ML — SIGNIFICANT CHANGE UP
HCO3 BLDV-SCNC: 28 MMOL/L — SIGNIFICANT CHANGE UP (ref 22–29)
HCT VFR BLD CALC: 30.6 % — LOW (ref 34.5–45)
HCT VFR BLDA CALC: 31 % — LOW (ref 34.5–46.5)
HGB BLD CALC-MCNC: 10.3 G/DL — LOW (ref 11.7–16.1)
HGB BLD-MCNC: 10.2 G/DL — LOW (ref 11.5–15.5)
IANC: 2.95 K/UL — SIGNIFICANT CHANGE UP (ref 1.8–7.4)
IMM GRANULOCYTES NFR BLD AUTO: 0.8 % — SIGNIFICANT CHANGE UP (ref 0–0.9)
INR BLD: 1.07 RATIO — SIGNIFICANT CHANGE UP (ref 0.85–1.18)
KETONES UR-MCNC: NEGATIVE MG/DL — SIGNIFICANT CHANGE UP
LACTATE BLDV-MCNC: 1.5 MMOL/L — SIGNIFICANT CHANGE UP (ref 0.5–2)
LEUKOCYTE ESTERASE UR-ACNC: NEGATIVE — SIGNIFICANT CHANGE UP
LIDOCAIN IGE QN: 37 U/L — SIGNIFICANT CHANGE UP (ref 7–60)
LYMPHOCYTES # BLD AUTO: 2.99 K/UL — SIGNIFICANT CHANGE UP (ref 1–3.3)
LYMPHOCYTES # BLD AUTO: 46.2 % — HIGH (ref 13–44)
MCHC RBC-ENTMCNC: 32 PG — SIGNIFICANT CHANGE UP (ref 27–34)
MCHC RBC-ENTMCNC: 33.3 GM/DL — SIGNIFICANT CHANGE UP (ref 32–36)
MCV RBC AUTO: 95.9 FL — SIGNIFICANT CHANGE UP (ref 80–100)
MONOCYTES # BLD AUTO: 0.42 K/UL — SIGNIFICANT CHANGE UP (ref 0–0.9)
MONOCYTES NFR BLD AUTO: 6.5 % — SIGNIFICANT CHANGE UP (ref 2–14)
NEUTROPHILS # BLD AUTO: 2.95 K/UL — SIGNIFICANT CHANGE UP (ref 1.8–7.4)
NEUTROPHILS NFR BLD AUTO: 45.6 % — SIGNIFICANT CHANGE UP (ref 43–77)
NITRITE UR-MCNC: NEGATIVE — SIGNIFICANT CHANGE UP
NRBC # BLD: 0 /100 WBCS — SIGNIFICANT CHANGE UP (ref 0–0)
NRBC # FLD: 0 K/UL — SIGNIFICANT CHANGE UP (ref 0–0)
PCO2 BLDV: 52 MMHG — SIGNIFICANT CHANGE UP (ref 39–52)
PH BLDV: 7.34 — SIGNIFICANT CHANGE UP (ref 7.32–7.43)
PH UR: 6.5 — SIGNIFICANT CHANGE UP (ref 5–8)
PLATELET # BLD AUTO: 153 K/UL — SIGNIFICANT CHANGE UP (ref 150–400)
PO2 BLDV: 37 MMHG — SIGNIFICANT CHANGE UP (ref 25–45)
POTASSIUM BLDV-SCNC: 4.3 MMOL/L — SIGNIFICANT CHANGE UP (ref 3.5–5.1)
POTASSIUM SERPL-MCNC: 4.4 MMOL/L — SIGNIFICANT CHANGE UP (ref 3.5–5.3)
POTASSIUM SERPL-SCNC: 4.4 MMOL/L — SIGNIFICANT CHANGE UP (ref 3.5–5.3)
PROT SERPL-MCNC: 6.7 G/DL — SIGNIFICANT CHANGE UP (ref 6–8.3)
PROT UR-MCNC: NEGATIVE MG/DL — SIGNIFICANT CHANGE UP
PROTHROM AB SERPL-ACNC: 12.1 SEC — SIGNIFICANT CHANGE UP (ref 9.5–13)
RBC # BLD: 3.19 M/UL — LOW (ref 3.8–5.2)
RBC # FLD: 16.1 % — HIGH (ref 10.3–14.5)
RH IG SCN BLD-IMP: POSITIVE — SIGNIFICANT CHANGE UP
SAO2 % BLDV: 57.8 % — LOW (ref 67–88)
SODIUM SERPL-SCNC: 141 MMOL/L — SIGNIFICANT CHANGE UP (ref 135–145)
SP GR SPEC: 1.02 — SIGNIFICANT CHANGE UP (ref 1–1.03)
UROBILINOGEN FLD QL: 0.2 MG/DL — SIGNIFICANT CHANGE UP (ref 0.2–1)
WBC # BLD: 6.47 K/UL — SIGNIFICANT CHANGE UP (ref 3.8–10.5)
WBC # FLD AUTO: 6.47 K/UL — SIGNIFICANT CHANGE UP (ref 3.8–10.5)

## 2024-08-04 PROCEDURE — 74177 CT ABD & PELVIS W/CONTRAST: CPT | Mod: 26,MC

## 2024-08-04 PROCEDURE — 99222 1ST HOSP IP/OBS MODERATE 55: CPT | Mod: 57,GC

## 2024-08-04 PROCEDURE — 71046 X-RAY EXAM CHEST 2 VIEWS: CPT | Mod: 26

## 2024-08-04 RX ORDER — GLUCAGON INJECTION, SOLUTION 0.5 MG/.1ML
1 INJECTION, SOLUTION SUBCUTANEOUS ONCE
Refills: 0 | Status: DISCONTINUED | OUTPATIENT
Start: 2024-08-04 | End: 2024-08-06

## 2024-08-04 RX ORDER — ATORVASTATIN CALCIUM 40 MG/1
40 TABLET, FILM COATED ORAL AT BEDTIME
Refills: 0 | Status: DISCONTINUED | OUTPATIENT
Start: 2024-08-04 | End: 2024-08-06

## 2024-08-04 RX ORDER — ATORVASTATIN CALCIUM 40 MG/1
1 TABLET, FILM COATED ORAL
Refills: 0 | DISCHARGE

## 2024-08-04 RX ORDER — DIPHENHYDRAMINE HCL 25 MG
25 CAPSULE ORAL ONCE
Refills: 0 | Status: COMPLETED | OUTPATIENT
Start: 2024-08-04 | End: 2024-08-04

## 2024-08-04 RX ORDER — DEXTROSE MONOHYDRATE, SODIUM CHLORIDE, SODIUM LACTATE, CALCIUM CHLORIDE, MAGNESIUM CHLORIDE 1.5; 538; 448; 18.4; 5.08 G/100ML; MG/100ML; MG/100ML; MG/100ML; MG/100ML
1000 SOLUTION INTRAPERITONEAL
Refills: 0 | Status: DISCONTINUED | OUTPATIENT
Start: 2024-08-04 | End: 2024-08-06

## 2024-08-04 RX ORDER — INSULIN LISPRO 100/ML
VIAL (ML) SUBCUTANEOUS
Refills: 0 | Status: DISCONTINUED | OUTPATIENT
Start: 2024-08-04 | End: 2024-08-06

## 2024-08-04 RX ORDER — LEVOTHYROXINE SODIUM 175 MCG
1 TABLET ORAL
Refills: 0 | DISCHARGE

## 2024-08-04 RX ORDER — LORATADINE 10 MG
17 TABLET,DISINTEGRATING ORAL DAILY
Refills: 0 | Status: DISCONTINUED | OUTPATIENT
Start: 2024-08-04 | End: 2024-08-06

## 2024-08-04 RX ORDER — NALTREXONE HYDROCHLORIDE 50 MG/1
1 TABLET, FILM COATED ORAL
Refills: 0 | DISCHARGE

## 2024-08-04 RX ORDER — DIPHENHYDRAMINE HCL 25 MG
1 CAPSULE ORAL
Refills: 0 | DISCHARGE

## 2024-08-04 RX ORDER — DIPHENHYDRAMINE HCL 25 MG
50 CAPSULE ORAL ONCE
Refills: 0 | Status: COMPLETED | OUTPATIENT
Start: 2024-08-04 | End: 2024-08-04

## 2024-08-04 RX ORDER — METRONIDAZOLE 500 MG/1
500 TABLET ORAL EVERY 8 HOURS
Refills: 0 | Status: DISCONTINUED | OUTPATIENT
Start: 2024-08-04 | End: 2024-08-05

## 2024-08-04 RX ORDER — ACETAMINOPHEN 500 MG
1000 TABLET ORAL EVERY 6 HOURS
Refills: 0 | Status: DISCONTINUED | OUTPATIENT
Start: 2024-08-04 | End: 2024-08-06

## 2024-08-04 RX ORDER — OXYCODONE HYDROCHLORIDE 30 MG/1
5 TABLET ORAL ONCE
Refills: 0 | Status: DISCONTINUED | OUTPATIENT
Start: 2024-08-04 | End: 2024-08-04

## 2024-08-04 RX ORDER — DIVALPROEX SODIUM 125 MG/1
3 CAPSULE, COATED PELLETS ORAL
Refills: 0 | DISCHARGE

## 2024-08-04 RX ORDER — DIVALPROEX SODIUM 125 MG/1
750 CAPSULE, COATED PELLETS ORAL
Refills: 0 | Status: DISCONTINUED | OUTPATIENT
Start: 2024-08-04 | End: 2024-08-06

## 2024-08-04 RX ORDER — ESCITALOPRAM OXALATE 20 MG
20 TABLET ORAL DAILY
Refills: 0 | Status: DISCONTINUED | OUTPATIENT
Start: 2024-08-04 | End: 2024-08-06

## 2024-08-04 RX ORDER — ACETAMINOPHEN 500 MG
1000 TABLET ORAL ONCE
Refills: 0 | Status: COMPLETED | OUTPATIENT
Start: 2024-08-04 | End: 2024-08-04

## 2024-08-04 RX ORDER — RISPERIDONE 1 MG/1
2 TABLET, FILM COATED ORAL
Refills: 0 | Status: DISCONTINUED | OUTPATIENT
Start: 2024-08-04 | End: 2024-08-04

## 2024-08-04 RX ORDER — RISPERIDONE 1 MG/1
2 TABLET, FILM COATED ORAL
Refills: 0 | Status: DISCONTINUED | OUTPATIENT
Start: 2024-08-04 | End: 2024-08-06

## 2024-08-04 RX ORDER — METHOCARBAMOL 500 MG
500 TABLET ORAL AT BEDTIME
Refills: 0 | Status: DISCONTINUED | OUTPATIENT
Start: 2024-08-04 | End: 2024-08-06

## 2024-08-04 RX ORDER — RISPERIDONE 1 MG/1
1 TABLET, FILM COATED ORAL AT BEDTIME
Refills: 0 | Status: DISCONTINUED | OUTPATIENT
Start: 2024-08-04 | End: 2024-08-04

## 2024-08-04 RX ORDER — NALTREXONE HYDROCHLORIDE 50 MG/1
50 TABLET, FILM COATED ORAL DAILY
Refills: 0 | Status: DISCONTINUED | OUTPATIENT
Start: 2024-08-04 | End: 2024-08-06

## 2024-08-04 RX ORDER — HEPARIN SODIUM 1000 [USP'U]/ML
5000 INJECTION, SOLUTION INTRAVENOUS; SUBCUTANEOUS EVERY 8 HOURS
Refills: 0 | Status: DISCONTINUED | OUTPATIENT
Start: 2024-08-04 | End: 2024-08-05

## 2024-08-04 RX ORDER — DEXTROSE 4 G
25 TABLET,CHEWABLE ORAL ONCE
Refills: 0 | Status: DISCONTINUED | OUTPATIENT
Start: 2024-08-04 | End: 2024-08-06

## 2024-08-04 RX ORDER — METRONIDAZOLE 500 MG/1
TABLET ORAL
Refills: 0 | Status: DISCONTINUED | OUTPATIENT
Start: 2024-08-04 | End: 2024-08-05

## 2024-08-04 RX ORDER — MELATONIN 3 MG
3 TABLET ORAL AT BEDTIME
Refills: 0 | Status: DISCONTINUED | OUTPATIENT
Start: 2024-08-04 | End: 2024-08-06

## 2024-08-04 RX ORDER — DEXTROSE 4 G
15 TABLET,CHEWABLE ORAL ONCE
Refills: 0 | Status: DISCONTINUED | OUTPATIENT
Start: 2024-08-04 | End: 2024-08-06

## 2024-08-04 RX ORDER — RISPERIDONE 1 MG/1
3 TABLET, FILM COATED ORAL AT BEDTIME
Refills: 0 | Status: DISCONTINUED | OUTPATIENT
Start: 2024-08-04 | End: 2024-08-06

## 2024-08-04 RX ORDER — METRONIDAZOLE 500 MG/1
500 TABLET ORAL ONCE
Refills: 0 | Status: COMPLETED | OUTPATIENT
Start: 2024-08-04 | End: 2024-08-04

## 2024-08-04 RX ORDER — DEXTROSE MONOHYDRATE, SODIUM CHLORIDE, SODIUM LACTATE, CALCIUM CHLORIDE, MAGNESIUM CHLORIDE 1.5; 538; 448; 18.4; 5.08 G/100ML; MG/100ML; MG/100ML; MG/100ML; MG/100ML
1000 SOLUTION INTRAPERITONEAL
Refills: 0 | Status: DISCONTINUED | OUTPATIENT
Start: 2024-08-05 | End: 2024-08-05

## 2024-08-04 RX ORDER — DEXTROSE MONOHYDRATE, SODIUM CHLORIDE, SODIUM LACTATE, CALCIUM CHLORIDE, MAGNESIUM CHLORIDE 1.5; 538; 448; 18.4; 5.08 G/100ML; MG/100ML; MG/100ML; MG/100ML; MG/100ML
1000 SOLUTION INTRAPERITONEAL ONCE
Refills: 0 | Status: COMPLETED | OUTPATIENT
Start: 2024-08-04 | End: 2024-08-04

## 2024-08-04 RX ORDER — ESCITALOPRAM OXALATE 20 MG
1 TABLET ORAL
Refills: 0 | DISCHARGE

## 2024-08-04 RX ORDER — RISPERIDONE 1 MG/1
1 TABLET, FILM COATED ORAL
Refills: 0 | DISCHARGE

## 2024-08-04 RX ORDER — LORATADINE 10 MG
17 TABLET,DISINTEGRATING ORAL
Refills: 0 | DISCHARGE

## 2024-08-04 RX ORDER — DEXTROSE 4 G
12.5 TABLET,CHEWABLE ORAL ONCE
Refills: 0 | Status: DISCONTINUED | OUTPATIENT
Start: 2024-08-04 | End: 2024-08-06

## 2024-08-04 RX ORDER — DIPHENHYDRAMINE HCL 25 MG
50 CAPSULE ORAL AT BEDTIME
Refills: 0 | Status: DISCONTINUED | OUTPATIENT
Start: 2024-08-04 | End: 2024-08-06

## 2024-08-04 RX ORDER — LEVOTHYROXINE SODIUM 175 MCG
100 TABLET ORAL DAILY
Refills: 0 | Status: DISCONTINUED | OUTPATIENT
Start: 2024-08-04 | End: 2024-08-06

## 2024-08-04 RX ORDER — INSULIN LISPRO 100/ML
VIAL (ML) SUBCUTANEOUS AT BEDTIME
Refills: 0 | Status: DISCONTINUED | OUTPATIENT
Start: 2024-08-04 | End: 2024-08-06

## 2024-08-04 RX ADMIN — OXYCODONE HYDROCHLORIDE 5 MILLIGRAM(S): 30 TABLET ORAL at 22:36

## 2024-08-04 RX ADMIN — METRONIDAZOLE 100 MILLIGRAM(S): 500 TABLET ORAL at 11:01

## 2024-08-04 RX ADMIN — Medication 100 MICROGRAM(S): at 12:35

## 2024-08-04 RX ADMIN — Medication 500 MILLIGRAM(S): at 21:35

## 2024-08-04 RX ADMIN — Medication 1000 MILLIGRAM(S): at 03:33

## 2024-08-04 RX ADMIN — HEPARIN SODIUM 5000 UNIT(S): 1000 INJECTION, SOLUTION INTRAVENOUS; SUBCUTANEOUS at 21:34

## 2024-08-04 RX ADMIN — Medication 1000 MILLIGRAM(S): at 13:30

## 2024-08-04 RX ADMIN — OXYCODONE HYDROCHLORIDE 5 MILLIGRAM(S): 30 TABLET ORAL at 23:30

## 2024-08-04 RX ADMIN — HEPARIN SODIUM 5000 UNIT(S): 1000 INJECTION, SOLUTION INTRAVENOUS; SUBCUTANEOUS at 14:32

## 2024-08-04 RX ADMIN — ATORVASTATIN CALCIUM 40 MILLIGRAM(S): 40 TABLET, FILM COATED ORAL at 21:34

## 2024-08-04 RX ADMIN — METRONIDAZOLE 100 MILLIGRAM(S): 500 TABLET ORAL at 21:37

## 2024-08-04 RX ADMIN — DIVALPROEX SODIUM 750 MILLIGRAM(S): 125 CAPSULE, COATED PELLETS ORAL at 18:32

## 2024-08-04 RX ADMIN — Medication 1000 MILLIGRAM(S): at 12:34

## 2024-08-04 RX ADMIN — Medication 20 MILLIGRAM(S): at 12:34

## 2024-08-04 RX ADMIN — Medication 25 MILLIGRAM(S): at 12:35

## 2024-08-04 RX ADMIN — Medication 400 MILLIGRAM(S): at 01:52

## 2024-08-04 RX ADMIN — Medication 200 MILLIGRAM(S): at 21:34

## 2024-08-04 RX ADMIN — NALTREXONE HYDROCHLORIDE 50 MILLIGRAM(S): 50 TABLET, FILM COATED ORAL at 12:35

## 2024-08-04 RX ADMIN — Medication 50 MILLIGRAM(S): at 21:34

## 2024-08-04 RX ADMIN — Medication 3 MILLIGRAM(S): at 21:35

## 2024-08-04 RX ADMIN — RISPERIDONE 2 MILLIGRAM(S): 1 TABLET, FILM COATED ORAL at 11:02

## 2024-08-04 RX ADMIN — RISPERIDONE 3 MILLIGRAM(S): 1 TABLET, FILM COATED ORAL at 21:35

## 2024-08-04 RX ADMIN — Medication 25 MILLIGRAM(S): at 14:33

## 2024-08-04 RX ADMIN — Medication 1000 MILLIGRAM(S): at 18:32

## 2024-08-04 RX ADMIN — Medication 100 MILLIGRAM(S): at 12:29

## 2024-08-04 RX ADMIN — Medication 50 MILLIGRAM(S): at 01:52

## 2024-08-04 RX ADMIN — DEXTROSE MONOHYDRATE, SODIUM CHLORIDE, SODIUM LACTATE, CALCIUM CHLORIDE, MAGNESIUM CHLORIDE 1000 MILLILITER(S): 1.5; 538; 448; 18.4; 5.08 SOLUTION INTRAPERITONEAL at 01:51

## 2024-08-04 NOTE — ED PROVIDER NOTE - PROGRESS NOTE DETAILS
José Medrano MD PGY-1: CTAP read consistent with mild acute appendicitis. Called Surgery who will come evaluate the patient for potential operative intervention. Labs otherwise unremarkable. Urine appears bland.

## 2024-08-04 NOTE — H&P ADULT - NSHPPHYSICALEXAM_GEN_ALL_CORE
VITAL SIGNS:  ICU Vital Signs Last 24 Hrs  T(C): 36.4 (04 Aug 2024 06:04), Max: 36.7 (03 Aug 2024 22:50)  T(F): 97.5 (04 Aug 2024 06:04), Max: 98.1 (04 Aug 2024 03:00)  HR: 72 (04 Aug 2024 07:48) (64 - 90)  BP: 106/74 (04 Aug 2024 07:48) (106/74 - 125/72)  BP(mean): --  ABP: --  ABP(mean): --  RR: 17 (04 Aug 2024 07:48) (17 - 18)  SpO2: 98% (04 Aug 2024 07:48) (96% - 100%)    O2 Parameters below as of 04 Aug 2024 07:48  Patient On (Oxygen Delivery Method): room air            PHYSICAL EXAMINATION:  General - well-nourished, no acute distress  Neuro - awake, alert, oriented x4  Lungs - breathing comfortably on room air  Heart - regular rate and rhythm  Abdomen - soft, obese, tender to deep palpation in RLQ and suprapubic region  Extremities - all four extremities are warm

## 2024-08-04 NOTE — ED PROVIDER NOTE - PHYSICAL EXAMINATION
GEN - NAD; well appearing; A+O x3; non-toxic appearing  CARD -s1s2, RRR, no M,G,R;   PULM - CTA b/l, symmetric breath sounds;   ABD -  +BS, TTP in RUQ and RLQ, soft, no guarding, no rebound, no masses;   BACK - no CVA tenderness, Normal  spine;   EXT - symmetric pulses, 2+ dp, capillary refill < 2 seconds, no cyanosis, no edema;   NEURO - no focal neuro deficits, no slurred speech

## 2024-08-04 NOTE — ED PROVIDER NOTE - OBJECTIVE STATEMENT
38 yo F with schizophrenia, Factor  leiden, DVT/PE on xarelto, htn, dm a/w abdominal pain and dysuria.  Pt recently seen for chest pain 4 days ago with negative workup including ct angio to r/o PE given history.  Of note patient noted to have recently admitted to [facility] last week for a UTI for 5 days and completed course of antibiotics.  Pt reports persistent c/o weakness, decreased appetitie, dysuria and frequency with associated RUQ pain. Denies sob, abd pain, n/v/d, hematuria, numbness, dizziness or syncope.

## 2024-08-04 NOTE — H&P ADULT - HISTORY OF PRESENT ILLNESS
40 yo F with schizophrenia, Factor  leiden, DVT/PE on xarelto, htn, dm a/w abdominal pain and dysuria. Reports two days of RLQ pain with associated nausea and fevers.   Of note patient noted to have recently admitted to [facility] last week for a UTI for 5 days and completed course of antibiotics.  Pt reports persistent c/o weakness, decreased appetitie, dysuria and frequency with associated RLQ pain. Denies sob, abd pain, n/v/d, hematuria, numbness, dizziness or syncope.    In the ED patient is afebrile and hemodynamically normal. Labs unremarkable. CTAP demonstrating acute appendicitis.

## 2024-08-04 NOTE — H&P ADULT - ASSESSMENT
40 yo F with schizophrenia, Factor  leiden, DVT/PE on xarelto, htn, dm presenting with acute appendicitis.    Plan:  - admit to surgery, Dr. Hong  - will add on for appendectomy 8/5  - CLD/NPO after midnight  - antibiotics  - holding Eliquis, VTE ppx with SQH  - restart all other home meds    discussed with Dr. Hong

## 2024-08-04 NOTE — ED ADULT NURSE NOTE - COVID-19 ORDERING FACILITY
Quality 111:Pneumonia Vaccination Status For Older Adults: Pneumococcal Vaccination Previously Received TIFFANIE/Maximilian

## 2024-08-04 NOTE — ED ADULT NURSE NOTE - CHIEF COMPLAINT QUOTE
Pt c/o right sided flank pain, headache and JOSE x1 day. Pt also noted to have swollen right hand with numbness to fingertips that started yesterday. Pt denies chest pain, dizziness. Phx of factor V deficiency, IBS, endometriosis, asthma, prediabetes, bipolar disorder.

## 2024-08-04 NOTE — PATIENT PROFILE ADULT - FALL HARM RISK - HARM RISK INTERVENTIONS

## 2024-08-04 NOTE — ED ADULT NURSE NOTE - NSFALLHARMRISKINTERV_ED_ALL_ED

## 2024-08-04 NOTE — H&P ADULT - NSHPLABSRESULTS_GEN_ALL_CORE
141  |  105  |  9   ----------------------------<  95  4.4   |  24  |  0.88    Ca    9.3      04 Aug 2024 01:41    TPro  6.7  /  Alb  4.0  /  TBili  <0.2  /  DBili  x   /  AST  23  /  ALT  60<H>  /  AlkPhos  70  08-04      PT/INR - ( 04 Aug 2024 03:40 )   PT: 12.1 sec;   INR: 1.07 ratio         PTT - ( 04 Aug 2024 03:40 )  PTT:30.9 sec        VBG - ( 04 Aug 2024 01:41 )  pH: 7.34  /  pCO2: 52    /  pO2: 37    / HCO3: 28    / Base Excess: 1.7   /  SaO2: 57.8   Lactate: 1.5      RECENT CULTURES:  Specimen Source: Clean Catch Clean Catch (Midstream)  Date/Time: 07-31 @ 14:40  Culture Results:   10,000 - 49,000 CFU/mL Candida albicans "Susceptibilities not performed"<!>  Gram Stain: --  Organism: --      CAPILLARY BLOOD GLUCOSE      POCT Blood Glucose.: 94 mg/dL (04 Aug 2024 05:20)  POCT Blood Glucose.: 104 mg/dL (03 Aug 2024 23:10)      IMAGING STUDIES:  < from: CT Abdomen and Pelvis w/ IV Cont (08.04.24 @ 02:48) >      INTERPRETATION:  CLINICAL INFORMATION: Right upper quadrant and right   flank pain    COMPARISON: CT abdomen pelvis 2/16/2024.    CONTRAST/COMPLICATIONS:  IV Contrast: Omnipaque 350  90 cc administered   10 cc discarded  Oral Contrast: NONE  Complications: None reported at time of study completion    PROCEDURE:  CT of the Abdomen and Pelvis was performed.  Sagittal and coronal reformats were performed.    FINDINGS:  LOWER CHEST: Within normal limits.    LIVER: Steatosis.  BILE DUCTS: Normal caliber.  GALLBLADDER: Within normal limits.  SPLEEN: Within normal limits.  PANCREAS: Within normal limits.  ADRENALS: Within normallimits.  KIDNEYS/URETERS: Within normal limits.    BLADDER: Minimally distended.  REPRODUCTIVE ORGANS: Uterus and adnexa within normal limits.    BOWEL: No bowel obstruction. Appendix is dilated up to 1 cm with trace   periappendiceal infiltration.  PERITONEUM/RETROPERITONEUM: No free air, fluid collection, or.  VESSELS: Within normal limits.  LYMPH NODES: No lymphadenopathy.  ABDOMINAL WALL: Small fat-containing umbilical hernia.  BONES: Within normal limits.    IMPRESSION:  Mild acute appendicitis. No abscess.    Hepatic steatosis.    --- End of Report ---    < end of copied text >

## 2024-08-04 NOTE — ED PROVIDER NOTE - ATTENDING CONTRIBUTION TO CARE
I performed the initial face to face bedside interview with this patient regarding history of present illness, review of symptoms and past medical, social and family history.  I completed an independent physical examination.  The history, review of symptoms and examination was documented by me.  I have discussed the patient’s plan of care and disposition with the resident.

## 2024-08-04 NOTE — ED ADULT NURSE NOTE - OBJECTIVE STATEMENT
Break RN: patient received in 14, A&Ox3 ambulatory at baseline pmh: factor V deficiency, IBS, endometriosis, asthma, prediabetes, bipolar disorder presenting to ED c/o right sided flank pain, abdominal pain, Break RN: patient received in 14, A&Ox3 ambulatory at baseline pmh: factor V deficiency, IBS, endometriosis, asthma, DM, HTN, bipolar disorder presenting to ED c/o right sided flank pain, abdominal pain, headache, JOSE x Break RN: patient received in 14, A&Ox3 ambulatory at baseline pmh: factor V deficiency, IBS, endometriosis, asthma, DM, HTN, bipolar disorder presenting to ED c/o right sided flank pain, abdominal pain, headache, JOSE x1. Pt's right hand noted to be swollen, pt unsure why. Pt endorses dysuria x1 week. Denies CP, Break RN: patient received in 14, A&Ox3 ambulatory at baseline pmh: factor V deficiency, DVT/PE, IBS, endometriosis, asthma, DM, HTN, bipolar disorder presenting to ED c/o right sided flank pain, abdominal pain, headache, JOSE and generalized weakness. Pt's right hand noted to be swollen, pt unsure why. Pt endorses dysuria x1 week. Breathing even and unlabored. Denies CP, n/v/d, fever chills, visual changes, hematuria. Pending USIV

## 2024-08-04 NOTE — ED ADULT NURSE REASSESSMENT NOTE - NS ED NURSE REASSESS COMMENT FT1
Pt stating her abd pain went away but is now requesting Robaxin for her chronic back pain. MD Avila made aware.
Pt at baseline mental status, breathing even and unlabored in bed. Pt denies chest pain, SOB, dizziness, headache, blurry vision, chills. Bed in lowest position, call bell within reach.

## 2024-08-05 ENCOUNTER — TRANSCRIPTION ENCOUNTER (OUTPATIENT)
Age: 39
End: 2024-08-05

## 2024-08-05 DIAGNOSIS — K35.80 UNSPECIFIED ACUTE APPENDICITIS: ICD-10-CM

## 2024-08-05 LAB
A1C WITH ESTIMATED AVERAGE GLUCOSE RESULT: 5.5 % — SIGNIFICANT CHANGE UP (ref 4–5.6)
ANION GAP SERPL CALC-SCNC: 13 MMOL/L — SIGNIFICANT CHANGE UP (ref 7–14)
BLD GP AB SCN SERPL QL: NEGATIVE — SIGNIFICANT CHANGE UP
BUN SERPL-MCNC: 11 MG/DL — SIGNIFICANT CHANGE UP (ref 7–23)
CALCIUM SERPL-MCNC: 9.1 MG/DL — SIGNIFICANT CHANGE UP (ref 8.4–10.5)
CHLORIDE SERPL-SCNC: 104 MMOL/L — SIGNIFICANT CHANGE UP (ref 98–107)
CO2 SERPL-SCNC: 23 MMOL/L — SIGNIFICANT CHANGE UP (ref 22–31)
CREAT SERPL-MCNC: 0.74 MG/DL — SIGNIFICANT CHANGE UP (ref 0.5–1.3)
EGFR: 107 ML/MIN/1.73M2 — SIGNIFICANT CHANGE UP
ESTIMATED AVERAGE GLUCOSE: 111 — SIGNIFICANT CHANGE UP
GLUCOSE BLDC GLUCOMTR-MCNC: 145 MG/DL — HIGH (ref 70–99)
GLUCOSE BLDC GLUCOMTR-MCNC: 151 MG/DL — HIGH (ref 70–99)
GLUCOSE BLDC GLUCOMTR-MCNC: 173 MG/DL — HIGH (ref 70–99)
GLUCOSE SERPL-MCNC: 82 MG/DL — SIGNIFICANT CHANGE UP (ref 70–99)
HCG SERPL-ACNC: <1 MIU/ML — SIGNIFICANT CHANGE UP
HCT VFR BLD CALC: 30 % — LOW (ref 34.5–45)
HGB BLD-MCNC: 10 G/DL — LOW (ref 11.5–15.5)
INR BLD: 0.98 RATIO — SIGNIFICANT CHANGE UP (ref 0.85–1.18)
MAGNESIUM SERPL-MCNC: 2 MG/DL — SIGNIFICANT CHANGE UP (ref 1.6–2.6)
MCHC RBC-ENTMCNC: 31.6 PG — SIGNIFICANT CHANGE UP (ref 27–34)
MCHC RBC-ENTMCNC: 33.3 GM/DL — SIGNIFICANT CHANGE UP (ref 32–36)
MCV RBC AUTO: 94.9 FL — SIGNIFICANT CHANGE UP (ref 80–100)
NRBC # BLD: 0 /100 WBCS — SIGNIFICANT CHANGE UP (ref 0–0)
NRBC # FLD: 0 K/UL — SIGNIFICANT CHANGE UP (ref 0–0)
PHOSPHATE SERPL-MCNC: 4.5 MG/DL — SIGNIFICANT CHANGE UP (ref 2.5–4.5)
PLATELET # BLD AUTO: 159 K/UL — SIGNIFICANT CHANGE UP (ref 150–400)
POTASSIUM SERPL-MCNC: 4.2 MMOL/L — SIGNIFICANT CHANGE UP (ref 3.5–5.3)
POTASSIUM SERPL-SCNC: 4.2 MMOL/L — SIGNIFICANT CHANGE UP (ref 3.5–5.3)
PROTHROM AB SERPL-ACNC: 11 SEC — SIGNIFICANT CHANGE UP (ref 9.5–13)
RBC # BLD: 3.16 M/UL — LOW (ref 3.8–5.2)
RBC # FLD: 15.9 % — HIGH (ref 10.3–14.5)
RH IG SCN BLD-IMP: POSITIVE — SIGNIFICANT CHANGE UP
SODIUM SERPL-SCNC: 140 MMOL/L — SIGNIFICANT CHANGE UP (ref 135–145)
WBC # BLD: 6.18 K/UL — SIGNIFICANT CHANGE UP (ref 3.8–10.5)
WBC # FLD AUTO: 6.18 K/UL — SIGNIFICANT CHANGE UP (ref 3.8–10.5)

## 2024-08-05 PROCEDURE — 44970 LAPAROSCOPY APPENDECTOMY: CPT

## 2024-08-05 PROCEDURE — 88304 TISSUE EXAM BY PATHOLOGIST: CPT | Mod: 26

## 2024-08-05 DEVICE — CLIP APPLIER COVIDIEN ENDOCLIP III 5MM: Type: IMPLANTABLE DEVICE | Status: FUNCTIONAL

## 2024-08-05 DEVICE — STAPLER COVIDIEN TRI-STAPLE 45MM TAN RELOAD: Type: IMPLANTABLE DEVICE | Status: FUNCTIONAL

## 2024-08-05 DEVICE — XI STAPLER SUREFORM RELOAD 45 WHITE: Type: IMPLANTABLE DEVICE | Status: FUNCTIONAL

## 2024-08-05 RX ORDER — OXYCODONE HYDROCHLORIDE 30 MG/1
5 TABLET ORAL EVERY 6 HOURS
Refills: 0 | Status: DISCONTINUED | OUTPATIENT
Start: 2024-08-05 | End: 2024-08-06

## 2024-08-05 RX ORDER — APIXABAN 5 MG/1
5 TABLET, FILM COATED ORAL EVERY 12 HOURS
Refills: 0 | Status: DISCONTINUED | OUTPATIENT
Start: 2024-08-05 | End: 2024-08-06

## 2024-08-05 RX ORDER — OXYCODONE HYDROCHLORIDE 30 MG/1
5 TABLET ORAL ONCE
Refills: 0 | Status: DISCONTINUED | OUTPATIENT
Start: 2024-08-05 | End: 2024-08-05

## 2024-08-05 RX ORDER — ACETAMINOPHEN 500 MG
1000 TABLET ORAL ONCE
Refills: 0 | Status: COMPLETED | OUTPATIENT
Start: 2024-08-05 | End: 2024-08-05

## 2024-08-05 RX ORDER — ACETAMINOPHEN 500 MG
2 TABLET ORAL
Qty: 0 | Refills: 0 | DISCHARGE
Start: 2024-08-05

## 2024-08-05 RX ORDER — FENTANYL CITRATE 1200 UG/1
25 LOZENGE ORAL; TRANSMUCOSAL
Refills: 0 | Status: DISCONTINUED | OUTPATIENT
Start: 2024-08-05 | End: 2024-08-05

## 2024-08-05 RX ORDER — OXYCODONE HYDROCHLORIDE 30 MG/1
2.5 TABLET ORAL EVERY 6 HOURS
Refills: 0 | Status: DISCONTINUED | OUTPATIENT
Start: 2024-08-05 | End: 2024-08-06

## 2024-08-05 RX ADMIN — Medication 2: at 17:34

## 2024-08-05 RX ADMIN — HEPARIN SODIUM 5000 UNIT(S): 1000 INJECTION, SOLUTION INTRAVENOUS; SUBCUTANEOUS at 05:54

## 2024-08-05 RX ADMIN — FENTANYL CITRATE 25 MICROGRAM(S): 1200 LOZENGE ORAL; TRANSMUCOSAL at 12:20

## 2024-08-05 RX ADMIN — APIXABAN 5 MILLIGRAM(S): 5 TABLET, FILM COATED ORAL at 19:05

## 2024-08-05 RX ADMIN — RISPERIDONE 2 MILLIGRAM(S): 1 TABLET, FILM COATED ORAL at 14:01

## 2024-08-05 RX ADMIN — METRONIDAZOLE 100 MILLIGRAM(S): 500 TABLET ORAL at 05:54

## 2024-08-05 RX ADMIN — Medication 100 MICROGRAM(S): at 05:54

## 2024-08-05 RX ADMIN — FENTANYL CITRATE 25 MICROGRAM(S): 1200 LOZENGE ORAL; TRANSMUCOSAL at 12:05

## 2024-08-05 RX ADMIN — Medication 3 MILLIGRAM(S): at 21:30

## 2024-08-05 RX ADMIN — Medication 1000 MILLIGRAM(S): at 17:36

## 2024-08-05 RX ADMIN — Medication 25 MILLIGRAM(S): at 14:01

## 2024-08-05 RX ADMIN — Medication 400 MILLIGRAM(S): at 11:55

## 2024-08-05 RX ADMIN — Medication 17 GRAM(S): at 13:36

## 2024-08-05 RX ADMIN — Medication 1000 MILLIGRAM(S): at 05:54

## 2024-08-05 RX ADMIN — DIVALPROEX SODIUM 750 MILLIGRAM(S): 125 CAPSULE, COATED PELLETS ORAL at 17:37

## 2024-08-05 RX ADMIN — HEPARIN SODIUM 5000 UNIT(S): 1000 INJECTION, SOLUTION INTRAVENOUS; SUBCUTANEOUS at 13:36

## 2024-08-05 RX ADMIN — Medication 50 MILLIGRAM(S): at 21:30

## 2024-08-05 RX ADMIN — NALTREXONE HYDROCHLORIDE 50 MILLIGRAM(S): 50 TABLET, FILM COATED ORAL at 14:00

## 2024-08-05 RX ADMIN — METRONIDAZOLE 100 MILLIGRAM(S): 500 TABLET ORAL at 13:41

## 2024-08-05 RX ADMIN — Medication 200 MILLIGRAM(S): at 21:30

## 2024-08-05 RX ADMIN — OXYCODONE HYDROCHLORIDE 5 MILLIGRAM(S): 30 TABLET ORAL at 16:16

## 2024-08-05 RX ADMIN — OXYCODONE HYDROCHLORIDE 5 MILLIGRAM(S): 30 TABLET ORAL at 16:46

## 2024-08-05 RX ADMIN — DEXTROSE MONOHYDRATE, SODIUM CHLORIDE, SODIUM LACTATE, CALCIUM CHLORIDE, MAGNESIUM CHLORIDE 125 MILLILITER(S): 1.5; 538; 448; 18.4; 5.08 SOLUTION INTRAPERITONEAL at 00:56

## 2024-08-05 RX ADMIN — Medication 1000 MILLIGRAM(S): at 18:06

## 2024-08-05 RX ADMIN — Medication 1000 MILLIGRAM(S): at 23:05

## 2024-08-05 RX ADMIN — Medication 20 MILLIGRAM(S): at 14:00

## 2024-08-05 RX ADMIN — Medication 100 MILLIGRAM(S): at 12:06

## 2024-08-05 RX ADMIN — Medication 1000 MILLIGRAM(S): at 12:21

## 2024-08-05 RX ADMIN — OXYCODONE HYDROCHLORIDE 5 MILLIGRAM(S): 30 TABLET ORAL at 19:05

## 2024-08-05 RX ADMIN — RISPERIDONE 3 MILLIGRAM(S): 1 TABLET, FILM COATED ORAL at 21:30

## 2024-08-05 RX ADMIN — ATORVASTATIN CALCIUM 40 MILLIGRAM(S): 40 TABLET, FILM COATED ORAL at 21:30

## 2024-08-05 RX ADMIN — DIVALPROEX SODIUM 750 MILLIGRAM(S): 125 CAPSULE, COATED PELLETS ORAL at 05:55

## 2024-08-05 RX ADMIN — Medication 500 MILLIGRAM(S): at 21:30

## 2024-08-05 NOTE — DISCHARGE NOTE PROVIDER - NSDCCPTREATMENT_GEN_ALL_CORE_FT
PRINCIPAL PROCEDURE  Procedure: Laparoscopic appendectomy  Findings and Treatment: WOUND CARE:  Please keep incisions clean and dry. Please do not Scrub or rub incisions. Do not use lotion or powder on incisions. Allow the steri strips to fall off on their own.  BATHING: You may shower and/or sponge bathe. You may use warm soapy water in the shower and rinse, pat dry.  ACTIVITY: No heavy lifting or straining. Otherwise, you may return to your usual level of physical activity. If you are taking narcotic pain medication DO NOT drive a car, operate machinery or make important decisions.  DIET: Return to your usual diet.  NOTIFY YOUR SURGEON IF YOU HAVE: any bleeding that does not stop, any pus draining from your wound(s), any increasing redness/swelling around your incision sites, any fever (over 100.4 F) persistent nausea/vomiting, or if your pain is not controlled on your discharge pain medications, unable to urinate.  Please follow up with your primary care physician in one week regarding your hospitalization, bring copies of your discharge paperwork.     PRINCIPAL PROCEDURE  Procedure: Laparoscopic appendectomy  Findings and Treatment: WOUND CARE:  Please keep incisions clean and dry. Please do not Scrub or rub incisions. Do not use lotion or powder on incisions. Gauze dressings can be removed 8/7. Allow the steri strips to fall off on their own. No other wound care needs.  BATHING: You may shower and/or sponge bathe. You may use warm soapy water in the shower and rinse, pat dry.  ACTIVITY: No heavy lifting or straining. Otherwise, you may return to your usual level of physical activity. If you are taking narcotic pain medication DO NOT drive a car, operate machinery or make important decisions.  DIET: Return to your usual diet.  NOTIFY YOUR SURGEON IF YOU HAVE: any bleeding that does not stop, any pus draining from your wound(s), any increasing redness/swelling around your incision sites, any fever (over 100.4 F) persistent nausea/vomiting, or if your pain is not controlled on your discharge pain medications, unable to urinate.  Please follow up with your primary care physician in one week regarding your hospitalization, bring copies of your discharge paperwork.

## 2024-08-05 NOTE — DISCHARGE NOTE PROVIDER - NSDCQMAMI_CARD_ALL_CORE
Cardiothoracic and Vascular Surgery          Events and Complaints:  Patient is POD#1s/p CABGX3. Patient extubated this am. Dobutamine weaned off.      Objective:   Vital Signs (last 24 hrs)_____ Last Charted___________Minimum____________ Maximum____________  Temp    H 100 (OCT 28 04:11) H 100 (OCT 28 04:11) H 100.6 (OCT 28 03:11)  Heart Rate   81  (OCT 28 09:30) 26  (OCT 27 12:20) 92  (OCT 27 13:25)  Resp Rate       H 23 (OCT 28 09:30) 9  (OCT 27 12:30) H 25 (OCT 28 08:00)  SBP    114  (OCT 28 07:00) 94  (OCT 27 15:30) 114  (OCT 28 07:00)  DBP    66  (OCT 28 07:00) 60  (OCT 27 15:30) 73  (OCT 27 16:30)  SpO2    95  (OCT 28 03:05) 95  (OCT 27 20:20) 96  (OCT 27 15:54)    PA:26/14  CI:2.2  CO:5.4    Pre Op Weight:114.7kg  Todays Weight:117.3kg  +2.6 kgs      Labs (Last four charted values)  WBC                  8.2 (OCT 28) 10.1 (OCT 27) H 16.9 (OCT 27) 8.0 (OCT 27)  Hgb                  L 11.4 (OCT 28) 12.1 (OCT 27) L 10.3 (OCT 27) 14.2 (OCT 27)  Hct                  L 34 (OCT 28) 36 (OCT 27) L 31 (OCT 27) 42 (OCT 27)  Plt                  177 (OCT 28) 171 (OCT 27) 215 (OCT 27) 276 (OCT 27)  Na                   140 (OCT 28) 138 (OCT 27) 138 (OCT 27) 138 (OCT 27)  K                    4.4 (OCT 28) 4.6 (OCT 27) 5.1 (OCT 27) 4.2 (OCT 27)  CO2                  21 (OCT 28) 27 (OCT 27) 26 (OCT 27) 27 (OCT 27)  Cl                   H 108 (OCT 28) 107 (OCT 27) 107 (OCT 27) 103 (OCT 27)  Cr                   0.90 (OCT 28) 0.94 (OCT 27) 1.01 (OCT 27) 1.02 (OCT 27)  BUN                  13 (OCT 28) 13 (OCT 27) 14 (OCT 27) 11 (OCT 27)  Glucose              H 125 (OCT 28) H 146 (OCT 27) H 153 (OCT 27) H 103 (OCT 27)  Mg                   2.3 (OCT 28) 2.5 (OCT 27) H 3.1 (OCT 27) 2.1 (OCT 27)  Ca                   L 7.8 (OCT 28) L 7.8 (OCT 27) L 8.0 (OCT 27) 9.1 (OCT 27)  PT                   H 11.7 (OCT 28) 11.4 (OCT 27) H 14.3 (OCT 27) 10.3 (OCT 27)  INR                  1.1 (OCT 28) 1.1 (OCT 27) H 1.4 (OCT 27) 1.0 (OCT 27)   PTT                  25 (OCT 28) 26 (OCT 27) 28 (OCT 27) 27 (OCT 27)  Troponin             C 17.75 (OCT 22) C 14.95 (OCT 22)     Respiratory: Bases are diminished. Patient is on3.0 liters, 98%.  CV: Sinus rhythm on the monitor. Heart rate 80s  Wounds:Midsternal incision with wound vac. Sterum is stable. SVG is clean, dry,and intact.  CT: Thin, serosanguineous drainage. 510cc/24 hrs.    Imaging:  PCXR 10/28/2020  IMPRESSION: There has been no change in the appearance of the heart or  mediastinal structures.  An endotracheal tube, [place]-Mally catheter,  orogastric tube, and drainage catheters remain in place.  The  [place]-Mally catheter has been pulled back slightly.  The right lung  remains clear.  There are changes of atelectasis at the left lung  base.  This does show interval improvement.  There is a small left  pleural effusion.  There is no pneumothorax.          Assessment/Plan:   Patient is POD#1 s/p CABGX3. Dobutamine weaned off.    1. Post op anemia:Likely due to acute blood loss and consumption secondary to surgery. No signs of active bleeding. Will hold transfusion for now. Will continue to monitor.  2. Cardiac index greater than 2: Will DC [place].  3. Fluid overload: Patient is 3.9 liters over last 24 hours. Will start IV diuretics.  4. Afib: Patient currently in NSR on Amiodaone drip. Further management per Cardiology.      The above pt was seen on rounds with Dr Rock. Above assessment and plan were discussed in detail with Dr Rock.    Ludmila Rogers RN.    I have reviewed and agree with the above examination, assessment and plan.      Kareem Rock M.D.  [facility]  Pager:[phone]  Cell: [phone]  Office: [phone]          Electronically Signed On 10.29.2020 17:07  ___________________________________________________   Shweta CORREIA, Kareem Arias   No

## 2024-08-05 NOTE — DISCHARGE NOTE PROVIDER - HOSPITAL COURSE
38 yo F with schizophrenia, Factor leiden, DVT/PE on eliquis, htn, dm a/w abdominal pain and dysuria. Reports two days of RLQ pain with associated nausea and fevers.   Of note patient noted to have recently admitted to [facility] last week for a UTI for 5 days and completed course of antibiotics.  Pt reports persistent c/o weakness, decreased appetite, dysuria and frequency with associated RLQ pain. Denies sob, abd pain, n/v/d, hematuria, numbness, dizziness or syncope.    In the ED patient is afebrile and hemodynamically normal. Labs unremarkable. CTAP demonstrating acute appendicitis. Patient was admitted to surgery with plan for OR. Patient is now s/p laparoscopic appendectomy on 8/5. Patient tolerated the procedure well without complications. Patient's home Eliquis was resumed later that night.     Post op patients diet was slowly advanced as tolerated.  At this time, pt is tolerating a regular diet, ambulating and voiding.  Pt has been deemed stable for discharge at this time.

## 2024-08-05 NOTE — ED PROCEDURE NOTE - CPROC ED SITE PREP1
chlorhexidine Xeljanz Counseling: I discussed with the patient the risks of Xeljanz therapy including increased risk of infection, liver issues, headache, diarrhea, or cold symptoms. Live vaccines should be avoided. They were instructed to call if they have any problems.

## 2024-08-05 NOTE — DISCHARGE NOTE PROVIDER - NSDCACTIVITY_GEN_ALL_CORE
Showering allowed/Walking - Indoors allowed/No heavy lifting/straining/Walking - Outdoors allowed/Follow Instructions Provided by your Surgical Team/Activity as tolerated

## 2024-08-05 NOTE — PRE-OP CHECKLIST - SELECT TESTS ORDERED
NO type and screen drawn x2; 8/4...patient has history at [facility] ok to not draw/BMP/CBC/PT/PTT/INR

## 2024-08-05 NOTE — DISCHARGE NOTE PROVIDER - NSDCMRMEDTOKEN_GEN_ALL_CORE_FT
acetaminophen 500 mg oral tablet: 2 tab(s) orally every 6 hours  apixaban 5 mg oral tablet: 1 tab(s) orally every 12 hours  atorvastatin 40 mg oral tablet: 1 tab(s) orally once a day  cloNIDine 0.1 mg oral tablet: 1 orally once a day (at bedtime)  Depakote  mg oral tablet, extended release: 3 tab(s) orally every 12 hours  diphenhydrAMINE 50 mg oral tablet: 1 tab(s) orally once a day (at bedtime)  levothyroxine 100 mcg (0.1 mg) oral tablet: 1 tab(s) orally once a day  Lexapro 20 mg oral tablet: 1 tab(s) orally once a day  melatonin 1.5 mg oral tablet, chewable: 2 orally once a day (at bedtime)  naltrexone 50 mg oral tablet: 1 tab(s) orally once a day  Polyethylene Glycol 3350: 17 gram(s) orally once a day  QUEtiapine 200 mg oral tablet: 1 tab(s) orally once a day   risperiDONE 2 mg oral tablet: 1 tab(s) orally once a day (in the morning)  risperiDONE 3 mg oral tablet: 1 orally once a day (at bedtime)  Robaxin 500 mg oral tablet: 2 orally once a day (at bedtime)  Seroquel 25 mg oral tablet: 1 tab(s) orally once a day (in the morning)   acetaminophen 500 mg oral tablet: 2 tab(s) orally every 6 hours  apixaban 5 mg oral tablet: 1 tab(s) orally every 12 hours  atorvastatin 40 mg oral tablet: 1 tab(s) orally once a day  cloNIDine 0.1 mg oral tablet: 1 orally once a day (at bedtime)  Depakote  mg oral tablet, extended release: 3 tab(s) orally every 12 hours  diphenhydrAMINE 50 mg oral tablet: 1 tab(s) orally once a day (at bedtime)  levothyroxine 100 mcg (0.1 mg) oral tablet: 1 tab(s) orally once a day  Lexapro 20 mg oral tablet: 1 tab(s) orally once a day  melatonin 1.5 mg oral tablet, chewable: 2 orally once a day (at bedtime)  naltrexone 50 mg oral tablet: 1 tab(s) orally once a day  oxyCODONE 5 mg oral tablet: 1 tab(s) orally every 6 hours as needed for Severe Pain (7 - 10) MDD: 4 tabs  Polyethylene Glycol 3350: 17 gram(s) orally once a day  QUEtiapine 200 mg oral tablet: 1 tab(s) orally once a day   risperiDONE 2 mg oral tablet: 1 tab(s) orally once a day (in the morning)  risperiDONE 3 mg oral tablet: 1 orally once a day (at bedtime)  Robaxin 500 mg oral tablet: 2 orally once a day (at bedtime)  Seroquel 25 mg oral tablet: 1 tab(s) orally once a day (in the morning)

## 2024-08-05 NOTE — DISCHARGE NOTE PROVIDER - NSDCFUSCHEDAPPT_GEN_ALL_CORE_FT
Will Barker  [facility]  OTOLARYNG [address]  Scheduled Appointment: 08/27/2024    [facility]  PEDALLERGY [address]  Scheduled Appointment: 09/18/2024    Raoul Weller  [facility]  CARDIOLOGY [id number]  Scheduled Appointment: 09/19/2024    Batsheva Sidhu  [facility]  ENDOCRIN [address]  Scheduled Appointment: 10/15/2024     Will Barker  [facility]  OTOLARYNG [address]  Scheduled Appointment: 08/27/2024    [facility]  PEDALLERGY [address]  Scheduled Appointment: 09/18/2024    Raoul Weller  [facility]  CARDIOLOGY [id number]  Scheduled Appointment: 09/19/2024    Batsheva Sidhu  [facility]  ENDOCRIN [address]  Scheduled Appointment: 10/15/2024    Rohan Braxton  [facility]  INTMED [address]  Scheduled Appointment: 11/04/2024

## 2024-08-05 NOTE — PROGRESS NOTE ADULT - ASSESSMENT
38 yo F with schizophrenia, Factor  leiden, DVT/PE on xarelto, htn, dm presenting with acute appendicitis.    Plan:  -OR today for appendectomy  - resume diet after OR  - Will discuss when to resume Eliquis postop  - all other home meds restarted    Godwin Almendarez MD (PGY1)   B team surgery -[id number]    Discussed with Dr. Boyd

## 2024-08-05 NOTE — ED PROCEDURE NOTE - ATTENDING CONTRIBUTION TO CARE
Gonnela: I have seen and examined the patient face to face.  I was present during the above procedure and  have reviewed and addended the procedure note.

## 2024-08-05 NOTE — DISCHARGE NOTE PROVIDER - NSDCFUADDAPPT_GEN_ALL_CORE_FT
Please schedule an appointment with your primary care provider within 1-2 weeks of discharge to go over your recent hospitalization. Please bring your discharge paperwork with you.

## 2024-08-05 NOTE — DISCHARGE NOTE PROVIDER - CARE PROVIDER_API CALL
Michael Hong  Surgery  [address] [place] [id number]  Phone: [phone]  Fax: [phone]  Follow Up Time: 2 weeks

## 2024-08-05 NOTE — PROGRESS NOTE ADULT - ATTENDING COMMENTS
Patient acute appendicitis  hold therapeutic ac  or for acute appendicitis  I have personally interviewed and examined this patient, reviewed pertinent labs and imaging, and discussed the case with colleagues, residents, and physician assistants on B Team rounds.    The active care issues are:  1. acute appendicitis    The Acute Care Surgery (B Team) Attending Group Practice:  Dr. Michael Hong, Dr. Tobi Zheng, Dr. Sriram Brothers, Dr. Julio Boyd,     urgent issues - spectra [id number]  nonurgent issues - [phone]  patient appointments or afterhours - [phone]

## 2024-08-05 NOTE — CHART NOTE - NSCHARTNOTEFT_GEN_A_CORE
Post Operative Note  Patient: YODIT MORROW 39y (1985) Female   MRN: [id number]  Location: [facility] [id number] [address] A  Visit: 08-04-24 Inpatient  Date: 08-05-24 @ 15:12    Procedure: S/P laparoscopic appendectomy for acute appendicitis     Subjective: Patient reports poorly controlled pain, generalized to her abdomen. Patient denies nausea, vomiting, fever, chills, chest pain, SOB, cough. Tolerating diet, passed TOV.       Objective:  Vitals: T(F): 97.8 (08-05-24 @ 14:15), Max: 99.3 (08-05-24 @ 00:39)  HR: 72 (08-05-24 @ 14:15)  BP: 123/77 (08-05-24 @ 14:15) (90/60 - 123/77)  RR: 18 (08-05-24 @ 14:15)  SpO2: 100% (08-05-24 @ 14:15)  Vent Settings:     In:   08-04-24 @ 07:01  -  08-05-24 @ 07:00  --------------------------------------------------------  IN: 1750 mL    08-05-24 @ 07:01  -  08-05-24 @ 15:12  --------------------------------------------------------  IN: 865 mL      IV Fluids: dextrose 5%. 1000 milliLiter(s) (100 mL/Hr) IV Continuous <Continuous>  dextrose 5%. 1000 milliLiter(s) (50 mL/Hr) IV Continuous <Continuous>  lactated ringers. 1000 milliLiter(s) (125 mL/Hr) IV Continuous <Continuous>      Out:   08-04-24 @ 07:01  -  08-05-24 @ 07:00  --------------------------------------------------------  OUT: 0 mL    08-05-24 @ 07:01  -  08-05-24 @ 15:12  --------------------------------------------------------  OUT: 650 mL      EBL:     Voided Urine:   08-04-24 @ 07:01  -  08-05-24 @ 07:00  --------------------------------------------------------  OUT: 0 mL    08-05-24 @ 07:01  -  08-05-24 @ 15:12  --------------------------------------------------------  OUT: 650 mL          Physical Examination:  General: NAD, resting comfortably in bed  HEENT: Normocephalic atraumatic  Respiratory: Nonlabored respirations, normal CW expansion.  Cardio: S1S2, regular rate and rhythm.  Abdomen: Softly distended, appropriately tender R>L abdomen, surgical port incisions are c/d/i, minimal saturation of periumbilical incision.   Vascular: extremities are warm and well perfused.     Imaging:  No post-op imaging studies    Assessment:  39yFemale patient S/P laparoscopic appendectomy for acute appendicitis     Plan:  - Home eliquis to start at 8pm tonight   - IV Abx: CTX/Flagyl   - Pain control: APAP q6hrs standing, Oxy 2.5/5 PRN  - Diet: Regular   - Activity: OOBAT  - DVT ppx: SCD's & home Eliquis     Godwin Almendarez MD (PGY1)   Godwin Almendarez MD (PGY1)   Date/Time: 08-05-24 @ 15:12

## 2024-08-06 ENCOUNTER — TRANSCRIPTION ENCOUNTER (OUTPATIENT)
Age: 39
End: 2024-08-06

## 2024-08-06 VITALS
RESPIRATION RATE: 18 BRPM | HEART RATE: 70 BPM | TEMPERATURE: 98 F | DIASTOLIC BLOOD PRESSURE: 55 MMHG | SYSTOLIC BLOOD PRESSURE: 107 MMHG | OXYGEN SATURATION: 100 %

## 2024-08-06 LAB
ANION GAP SERPL CALC-SCNC: 17 MMOL/L — HIGH (ref 7–14)
APTT BLD: SIGNIFICANT CHANGE UP SEC (ref 24.5–35.6)
BUN SERPL-MCNC: 12 MG/DL — SIGNIFICANT CHANGE UP (ref 7–23)
CALCIUM SERPL-MCNC: 9.4 MG/DL — SIGNIFICANT CHANGE UP (ref 8.4–10.5)
CHLORIDE SERPL-SCNC: 103 MMOL/L — SIGNIFICANT CHANGE UP (ref 98–107)
CO2 SERPL-SCNC: 18 MMOL/L — LOW (ref 22–31)
CREAT SERPL-MCNC: 0.78 MG/DL — SIGNIFICANT CHANGE UP (ref 0.5–1.3)
EGFR: 100 ML/MIN/1.73M2 — SIGNIFICANT CHANGE UP
GLUCOSE BLDC GLUCOMTR-MCNC: 110 MG/DL — HIGH (ref 70–99)
GLUCOSE BLDC GLUCOMTR-MCNC: 127 MG/DL — HIGH (ref 70–99)
GLUCOSE SERPL-MCNC: 101 MG/DL — HIGH (ref 70–99)
HCT VFR BLD CALC: 30.5 % — LOW (ref 34.5–45)
HGB BLD-MCNC: 10.2 G/DL — LOW (ref 11.5–15.5)
INR BLD: 0.92 RATIO — SIGNIFICANT CHANGE UP (ref 0.85–1.18)
MAGNESIUM SERPL-MCNC: 1.8 MG/DL — SIGNIFICANT CHANGE UP (ref 1.6–2.6)
MCHC RBC-ENTMCNC: 32.2 PG — SIGNIFICANT CHANGE UP (ref 27–34)
MCHC RBC-ENTMCNC: 33.4 GM/DL — SIGNIFICANT CHANGE UP (ref 32–36)
MCV RBC AUTO: 96.2 FL — SIGNIFICANT CHANGE UP (ref 80–100)
NRBC # BLD: 0 /100 WBCS — SIGNIFICANT CHANGE UP (ref 0–0)
NRBC # FLD: 0 K/UL — SIGNIFICANT CHANGE UP (ref 0–0)
PHOSPHATE SERPL-MCNC: 1.7 MG/DL — LOW (ref 2.5–4.5)
PLATELET # BLD AUTO: 123 K/UL — LOW (ref 150–400)
POTASSIUM SERPL-MCNC: 4.7 MMOL/L — SIGNIFICANT CHANGE UP (ref 3.5–5.3)
POTASSIUM SERPL-SCNC: 4.7 MMOL/L — SIGNIFICANT CHANGE UP (ref 3.5–5.3)
PROTHROM AB SERPL-ACNC: 10.4 SEC — SIGNIFICANT CHANGE UP (ref 9.5–13)
RBC # BLD: 3.17 M/UL — LOW (ref 3.8–5.2)
RBC # FLD: 15.9 % — HIGH (ref 10.3–14.5)
SODIUM SERPL-SCNC: 138 MMOL/L — SIGNIFICANT CHANGE UP (ref 135–145)
WBC # BLD: 11.94 K/UL — HIGH (ref 3.8–10.5)
WBC # FLD AUTO: 11.94 K/UL — HIGH (ref 3.8–10.5)

## 2024-08-06 RX ORDER — SODIUM PHOSPHATE, MONOBASIC, MONOHYDRATE 276; 142 MG/ML; MG/ML
30 INJECTION, SOLUTION INTRAVENOUS ONCE
Refills: 0 | Status: COMPLETED | OUTPATIENT
Start: 2024-08-06 | End: 2024-08-06

## 2024-08-06 RX ORDER — DIPHENHYDRAMINE HCL 25 MG
50 CAPSULE ORAL ONCE
Refills: 0 | Status: COMPLETED | OUTPATIENT
Start: 2024-08-06 | End: 2024-08-06

## 2024-08-06 RX ORDER — POTASSIUM PHOSPHATE, MONOBASIC AND POTASSIUM PHOSPHATE, DIBASIC 224; 236 MG/ML; MG/ML
30 INJECTION, SOLUTION INTRAVENOUS ONCE
Refills: 0 | Status: DISCONTINUED | OUTPATIENT
Start: 2024-08-06 | End: 2024-08-06

## 2024-08-06 RX ORDER — SOD PHOS DI, MONO/K PHOS MONO 250 MG
2 TABLET ORAL ONCE
Refills: 0 | Status: COMPLETED | OUTPATIENT
Start: 2024-08-06 | End: 2024-08-06

## 2024-08-06 RX ORDER — OXYCODONE HYDROCHLORIDE 30 MG/1
1 TABLET ORAL
Qty: 5 | Refills: 0
Start: 2024-08-06

## 2024-08-06 RX ADMIN — APIXABAN 5 MILLIGRAM(S): 5 TABLET, FILM COATED ORAL at 08:09

## 2024-08-06 RX ADMIN — DIVALPROEX SODIUM 750 MILLIGRAM(S): 125 CAPSULE, COATED PELLETS ORAL at 05:35

## 2024-08-06 RX ADMIN — Medication 20 MILLIGRAM(S): at 13:07

## 2024-08-06 RX ADMIN — Medication 1000 MILLIGRAM(S): at 13:06

## 2024-08-06 RX ADMIN — RISPERIDONE 2 MILLIGRAM(S): 1 TABLET, FILM COATED ORAL at 08:08

## 2024-08-06 RX ADMIN — Medication 50 MILLIGRAM(S): at 10:00

## 2024-08-06 RX ADMIN — OXYCODONE HYDROCHLORIDE 5 MILLIGRAM(S): 30 TABLET ORAL at 01:00

## 2024-08-06 RX ADMIN — Medication 2 PACKET(S): at 13:44

## 2024-08-06 RX ADMIN — Medication 17 GRAM(S): at 13:06

## 2024-08-06 RX ADMIN — OXYCODONE HYDROCHLORIDE 5 MILLIGRAM(S): 30 TABLET ORAL at 00:09

## 2024-08-06 RX ADMIN — Medication 100 MICROGRAM(S): at 05:35

## 2024-08-06 RX ADMIN — Medication 25 MILLIGRAM(S): at 13:07

## 2024-08-06 RX ADMIN — SODIUM PHOSPHATE, MONOBASIC, MONOHYDRATE 85 MILLIMOLE(S): 276; 142 INJECTION, SOLUTION INTRAVENOUS at 13:09

## 2024-08-06 RX ADMIN — NALTREXONE HYDROCHLORIDE 50 MILLIGRAM(S): 50 TABLET, FILM COATED ORAL at 13:06

## 2024-08-06 RX ADMIN — Medication 50 MILLIGRAM(S): at 16:28

## 2024-08-06 RX ADMIN — OXYCODONE HYDROCHLORIDE 5 MILLIGRAM(S): 30 TABLET ORAL at 08:09

## 2024-08-06 RX ADMIN — Medication 1000 MILLIGRAM(S): at 05:35

## 2024-08-06 RX ADMIN — OXYCODONE HYDROCHLORIDE 5 MILLIGRAM(S): 30 TABLET ORAL at 14:23

## 2024-08-06 NOTE — PROGRESS NOTE ADULT - ASSESSMENT
38 yo F with schizophrenia, Factor  leiden, DVT/PE on xarelto, htn, dm presenting with acute appendicitis now POD#1 s/p lap appy and recovering well from surgery, and has now resumed her home eliquis without signs of postoperative bleeding.     Plan:  - Discharge to patient's group home today  - Restart home meds   - Follow up in 2 weeks for postop visit      Godwin Almendarez MD (PGY1)   B team surgery -[id number]

## 2024-08-06 NOTE — PROVIDER CONTACT NOTE (OTHER) - SITUATION
pt consumed small amount of mixed fruit juice that contained pineapple juice during lunch, reports forgot she was allergic

## 2024-08-06 NOTE — DISCHARGE NOTE NURSING/CASE MANAGEMENT/SOCIAL WORK - PATIENT PORTAL LINK FT
You can access the FollowMyHealth Patient Portal offered by [facility] by registering at the following website: [email or web address]. By joining [employer]’s FollowMyHealth portal, you will also be able to view your health information using other applications (apps) compatible with our system.

## 2024-08-06 NOTE — DISCHARGE NOTE NURSING/CASE MANAGEMENT/SOCIAL WORK - NSDCPEFALRISK_GEN_ALL_CORE
For information on Fall & Injury Prevention, visit: [email or web address] OR  [email or web address] OR  https://www.cdc.gov/steadi/patient.html

## 2024-08-06 NOTE — PROGRESS NOTE ADULT - SUBJECTIVE AND OBJECTIVE BOX
TEAM [ B ] Surgery Daily Progress Note  =====================================================    SUBJECTIVE:  Patient reports that they're feeling well, reports that abdominal pain is controlled with PRN pain medications. Denies fever, chills, N/V, chest pain, SOB    ALLERGIES:  Toradol (Unknown)  trazodone (Other)  Zofran (Unknown)  [This allergen will not trigger allergy alert] pineapple allergenic extract (Swelling)  TraMADol Hydrochloride (Unknown)  ibuprofen (Pruritus)  Zofran (Hives)  TraZODone Hydrochloride (Unknown)  penicillin (Other)  Toradol (Hives)  penicillin (Hives)  latex (Blisters)  gabapentin (Unknown)  [This allergen will not trigger allergy alert] Toradol SOLN (Unknown)  Zofran (Rash)    -------------------------------------------------------------------------------------    MEDICATIONS:  acetaminophen     Tablet .. 1000 milliGRAM(s) Oral every 6 hours  atorvastatin 40 milliGRAM(s) Oral at bedtime  cefTRIAXone   IVPB 1000 milliGRAM(s) IV Intermittent every 24 hours  dextrose 5%. 1000 milliLiter(s) IV Continuous <Continuous>  dextrose 5%. 1000 milliLiter(s) IV Continuous <Continuous>  dextrose 50% Injectable 25 Gram(s) IV Push once  dextrose 50% Injectable 12.5 Gram(s) IV Push once  dextrose 50% Injectable 25 Gram(s) IV Push once  dextrose Oral Gel 15 Gram(s) Oral once PRN  diphenhydrAMINE 50 milliGRAM(s) Oral at bedtime  divalproex  milliGRAM(s) Oral <User Schedule>  escitalopram 20 milliGRAM(s) Oral daily  glucagon  Injectable 1 milliGRAM(s) IntraMuscular once  heparin   Injectable 5000 Unit(s) SubCutaneous every 8 hours  insulin lispro (ADMELOG) corrective regimen sliding scale   SubCutaneous at bedtime  insulin lispro (ADMELOG) corrective regimen sliding scale   SubCutaneous three times a day before meals  lactated ringers. 1000 milliLiter(s) IV Continuous <Continuous>  levothyroxine 100 MICROGram(s) Oral daily  melatonin 3 milliGRAM(s) Oral at bedtime  methocarbamol 500 milliGRAM(s) Oral at bedtime  metroNIDAZOLE  IVPB 500 milliGRAM(s) IV Intermittent every 8 hours  metroNIDAZOLE  IVPB      naltrexone 50 milliGRAM(s) Oral daily  polyethylene glycol 3350 17 Gram(s) Oral daily  QUEtiapine 25 milliGRAM(s) Oral daily  QUEtiapine 200 milliGRAM(s) Oral at bedtime  risperiDONE   Tablet 2 milliGRAM(s) Oral <User Schedule>  risperiDONE   Tablet 3 milliGRAM(s) Oral at bedtime    --------------------------------------------------------------------------------------    VITAL SIGNS:  T(C): 36.4 (08-05-24 @ 07:53), Max: 37.4 (08-05-24 @ 00:39)  HR: 71 (08-05-24 @ 07:53) (60 - 78)  BP: 122/75 (08-05-24 @ 07:53) (100/70 - 122/75)  RR: 16 (08-05-24 @ 07:53) (16 - 20)  SpO2: 97% (08-05-24 @ 07:53) (97% - 100%)  --------------------------------------------------------------------------------------    INS AND OUTS:    08-04-24 @ 07:01  -  08-05-24 @ 07:00  --------------------------------------------------------  IN: 1750 mL / OUT: 0 mL / NET: 1750 mL      --------------------------------------------------------------------------------------      EXAM    General: NAD, resting in bed comfortably.  Cardiac: regular rate, warm and well perfused  Respiratory: Nonlabored respirations, normal cw expansion.  Abdomen: soft, tender to palpation in RLQ, nondistended, no rebound or guarding   Extremities: normal strength, FROM, no deformities    --------------------------------------------------------------------------------------    LABS      
ANESTHESIA POSTOP CHECK    39y Female POSTOP DAY 1 S/P Lap appy    Vital Signs Last 24 Hrs  T(C): 36.7 (06 Aug 2024 09:30), Max: 37 (05 Aug 2024 20:02)  T(F): 98.1 (06 Aug 2024 09:30), Max: 98.6 (05 Aug 2024 20:02)  HR: 78 (06 Aug 2024 09:30) (72 - 98)  BP: 108/56 (06 Aug 2024 09:30) (90/60 - 132/81)  BP(mean): 87 (05 Aug 2024 13:00) (69 - 92)  RR: 18 (06 Aug 2024 09:30) (18 - 20)  SpO2: 100% (06 Aug 2024 09:30) (95% - 100%)    Parameters below as of 06 Aug 2024 09:30  Patient On (Oxygen Delivery Method): room air      I&O's Summary    05 Aug 2024 07:01  -  06 Aug 2024 07:00  --------------------------------------------------------  IN: 1590 mL / OUT: 1250 mL / NET: 340 mL        [X] NO APPARENT ANESTHESIA COMPLICATIONS      Comments: 
TEAM [ B ] Surgery Daily Progress Note  =====================================================    SUBJECTIVE: Patient reports that they're feeling well and that pain is well controlled. Resumed home eliquis last night.  Denies fever, chills, N/V, chest pain, SOB    ALLERGIES:  Toradol (Unknown)  trazodone (Other)  Zofran (Unknown)  [This allergen will not trigger allergy alert] pineapple allergenic extract (Swelling)  TraMADol Hydrochloride (Unknown)  ibuprofen (Pruritus)  Zofran (Hives)  TraZODone Hydrochloride (Unknown)  penicillin (Other)  Toradol (Hives)  penicillin (Hives)  latex (Blisters)  gabapentin (Unknown)  [This allergen will not trigger allergy alert] Toradol SOLN (Unknown)  Zofran (Rash)    -------------------------------------------------------------------------------------    MEDICATIONS:  acetaminophen     Tablet .. 1000 milliGRAM(s) Oral every 6 hours  apixaban 5 milliGRAM(s) Oral every 12 hours  atorvastatin 40 milliGRAM(s) Oral at bedtime  dextrose 5%. 1000 milliLiter(s) IV Continuous <Continuous>  dextrose 5%. 1000 milliLiter(s) IV Continuous <Continuous>  dextrose 50% Injectable 25 Gram(s) IV Push once  dextrose 50% Injectable 12.5 Gram(s) IV Push once  dextrose 50% Injectable 25 Gram(s) IV Push once  dextrose Oral Gel 15 Gram(s) Oral once PRN  diphenhydrAMINE 50 milliGRAM(s) Oral at bedtime  divalproex  milliGRAM(s) Oral <User Schedule>  escitalopram 20 milliGRAM(s) Oral daily  glucagon  Injectable 1 milliGRAM(s) IntraMuscular once  insulin lispro (ADMELOG) corrective regimen sliding scale   SubCutaneous three times a day before meals  insulin lispro (ADMELOG) corrective regimen sliding scale   SubCutaneous at bedtime  levothyroxine 100 MICROGram(s) Oral daily  melatonin 3 milliGRAM(s) Oral at bedtime  methocarbamol 500 milliGRAM(s) Oral at bedtime  naltrexone 50 milliGRAM(s) Oral daily  oxyCODONE    IR 5 milliGRAM(s) Oral every 6 hours PRN  oxyCODONE    IR 2.5 milliGRAM(s) Oral every 6 hours PRN  polyethylene glycol 3350 17 Gram(s) Oral daily  potassium phosphate / sodium phosphate Powder (PHOS-NaK) 2 Packet(s) Oral once  QUEtiapine 200 milliGRAM(s) Oral at bedtime  QUEtiapine 25 milliGRAM(s) Oral daily  risperiDONE   Tablet 2 milliGRAM(s) Oral <User Schedule>  risperiDONE   Tablet 3 milliGRAM(s) Oral at bedtime    --------------------------------------------------------------------------------------    VITAL SIGNS:  T(C): 36.5 (08-06-24 @ 12:00), Max: 37 (08-05-24 @ 20:02)  HR: 70 (08-06-24 @ 12:00) (70 - 98)  BP: 107/55 (08-06-24 @ 12:00) (107/55 - 132/81)  RR: 18 (08-06-24 @ 12:00) (18 - 18)  SpO2: 100% (08-06-24 @ 12:00) (95% - 100%)  --------------------------------------------------------------------------------------    INS AND OUTS:    08-05-24 @ 07:01  -  08-06-24 @ 07:00  --------------------------------------------------------  IN: 1590 mL / OUT: 1250 mL / NET: 340 mL    08-06-24 @ 07:01  -  08-06-24 @ 13:43  --------------------------------------------------------  IN: 0 mL / OUT: 900 mL / NET: -900 mL      --------------------------------------------------------------------------------------      EXAM    General: NAD, resting in bed comfortably.  Cardiac: regular rate, warm and well perfused  Respiratory: Nonlabored respirations, normal cw expansion.  Abdomen: soft, nontender, nondistended  Extremities: normal strength, FROM, no deformities    --------------------------------------------------------------------------------------    LABS

## 2024-08-06 NOTE — PROGRESS NOTE ADULT - ATTENDING COMMENTS
Patient s/p lap appy  recovering well  resume therapeutic ac    I have personally interviewed and examined this patient, reviewed pertinent labs and imaging, and discussed the case with colleagues, residents, and physician assistants on B Team rounds.    The active care issues are:  1. acute appendicitis    The Acute Care Surgery (B Team) Attending Group Practice:  Dr. Michael Hong, Dr. Tobi Zheng, Dr. Sriram Brothers, Dr. Julio Boyd,     urgent issues - spectra [id number]  nonurgent issues - [phone]  patient appointments or afterhours - [phone]

## 2024-08-08 ENCOUNTER — EMERGENCY (EMERGENCY)
Facility: HOSPITAL | Age: 39
LOS: 1 days | Discharge: HOME | End: 2024-08-08
Attending: EMERGENCY MEDICINE | Admitting: EMERGENCY MEDICINE
Payer: MEDICARE

## 2024-08-08 ENCOUNTER — NON-APPOINTMENT (OUTPATIENT)
Age: 39
End: 2024-08-08

## 2024-08-08 VITALS
TEMPERATURE: 99 F | HEART RATE: 89 BPM | SYSTOLIC BLOOD PRESSURE: 104 MMHG | DIASTOLIC BLOOD PRESSURE: 71 MMHG | RESPIRATION RATE: 18 BRPM | OXYGEN SATURATION: 99 %

## 2024-08-08 VITALS
RESPIRATION RATE: 20 BRPM | WEIGHT: 240.08 LBS | HEIGHT: 68 IN | OXYGEN SATURATION: 99 % | DIASTOLIC BLOOD PRESSURE: 69 MMHG | TEMPERATURE: 99 F | SYSTOLIC BLOOD PRESSURE: 101 MMHG | HEART RATE: 88 BPM

## 2024-08-08 LAB
ALBUMIN SERPL ELPH-MCNC: 3.8 G/DL — SIGNIFICANT CHANGE UP (ref 3.3–5)
ALP SERPL-CCNC: 70 U/L — SIGNIFICANT CHANGE UP (ref 40–120)
ALT FLD-CCNC: 50 U/L — HIGH (ref 4–33)
ANION GAP SERPL CALC-SCNC: 15 MMOL/L — HIGH (ref 7–14)
APPEARANCE UR: CLEAR — SIGNIFICANT CHANGE UP
APTT BLD: 29.1 SEC — SIGNIFICANT CHANGE UP (ref 24.5–35.6)
AST SERPL-CCNC: 34 U/L — HIGH (ref 4–32)
BASOPHILS # BLD AUTO: 0.03 K/UL — SIGNIFICANT CHANGE UP (ref 0–0.2)
BASOPHILS NFR BLD AUTO: 0.4 % — SIGNIFICANT CHANGE UP (ref 0–2)
BILIRUB SERPL-MCNC: <0.2 MG/DL — SIGNIFICANT CHANGE UP (ref 0.2–1.2)
BILIRUB UR-MCNC: NEGATIVE — SIGNIFICANT CHANGE UP
BLOOD GAS VENOUS COMPREHENSIVE RESULT: SIGNIFICANT CHANGE UP
BUN SERPL-MCNC: 13 MG/DL — SIGNIFICANT CHANGE UP (ref 7–23)
CALCIUM SERPL-MCNC: 9.2 MG/DL — SIGNIFICANT CHANGE UP (ref 8.4–10.5)
CHLORIDE SERPL-SCNC: 100 MMOL/L — SIGNIFICANT CHANGE UP (ref 98–107)
CO2 SERPL-SCNC: 23 MMOL/L — SIGNIFICANT CHANGE UP (ref 22–31)
COLOR SPEC: YELLOW — SIGNIFICANT CHANGE UP
CREAT SERPL-MCNC: 0.96 MG/DL — SIGNIFICANT CHANGE UP (ref 0.5–1.3)
DIFF PNL FLD: NEGATIVE — SIGNIFICANT CHANGE UP
EGFR: 78 ML/MIN/1.73M2 — SIGNIFICANT CHANGE UP
EOSINOPHIL # BLD AUTO: 0.04 K/UL — SIGNIFICANT CHANGE UP (ref 0–0.5)
EOSINOPHIL NFR BLD AUTO: 0.6 % — SIGNIFICANT CHANGE UP (ref 0–6)
GLUCOSE SERPL-MCNC: 80 MG/DL — SIGNIFICANT CHANGE UP (ref 70–99)
GLUCOSE UR QL: NEGATIVE MG/DL — SIGNIFICANT CHANGE UP
HCG SERPL-ACNC: <1 MIU/ML — SIGNIFICANT CHANGE UP
HCT VFR BLD CALC: 31 % — LOW (ref 34.5–45)
HGB BLD-MCNC: 10.1 G/DL — LOW (ref 11.5–15.5)
IANC: 3.24 K/UL — SIGNIFICANT CHANGE UP (ref 1.8–7.4)
IMM GRANULOCYTES NFR BLD AUTO: 0.4 % — SIGNIFICANT CHANGE UP (ref 0–0.9)
INR BLD: 1.02 RATIO — SIGNIFICANT CHANGE UP (ref 0.85–1.18)
KETONES UR-MCNC: NEGATIVE MG/DL — SIGNIFICANT CHANGE UP
LEUKOCYTE ESTERASE UR-ACNC: NEGATIVE — SIGNIFICANT CHANGE UP
LIDOCAIN IGE QN: 32 U/L — SIGNIFICANT CHANGE UP (ref 7–60)
LYMPHOCYTES # BLD AUTO: 3.05 K/UL — SIGNIFICANT CHANGE UP (ref 1–3.3)
LYMPHOCYTES # BLD AUTO: 43 % — SIGNIFICANT CHANGE UP (ref 13–44)
MCHC RBC-ENTMCNC: 32.1 PG — SIGNIFICANT CHANGE UP (ref 27–34)
MCHC RBC-ENTMCNC: 32.6 GM/DL — SIGNIFICANT CHANGE UP (ref 32–36)
MCV RBC AUTO: 98.4 FL — SIGNIFICANT CHANGE UP (ref 80–100)
MONOCYTES # BLD AUTO: 0.7 K/UL — SIGNIFICANT CHANGE UP (ref 0–0.9)
MONOCYTES NFR BLD AUTO: 9.9 % — SIGNIFICANT CHANGE UP (ref 2–14)
NEUTROPHILS # BLD AUTO: 3.24 K/UL — SIGNIFICANT CHANGE UP (ref 1.8–7.4)
NEUTROPHILS NFR BLD AUTO: 45.7 % — SIGNIFICANT CHANGE UP (ref 43–77)
NITRITE UR-MCNC: NEGATIVE — SIGNIFICANT CHANGE UP
NRBC # BLD: 0 /100 WBCS — SIGNIFICANT CHANGE UP (ref 0–0)
NRBC # FLD: 0 K/UL — SIGNIFICANT CHANGE UP (ref 0–0)
PH UR: 6.5 — SIGNIFICANT CHANGE UP (ref 5–8)
PLATELET # BLD AUTO: 146 K/UL — LOW (ref 150–400)
POTASSIUM SERPL-MCNC: 4.4 MMOL/L — SIGNIFICANT CHANGE UP (ref 3.5–5.3)
POTASSIUM SERPL-SCNC: 4.4 MMOL/L — SIGNIFICANT CHANGE UP (ref 3.5–5.3)
PROT SERPL-MCNC: 7.1 G/DL — SIGNIFICANT CHANGE UP (ref 6–8.3)
PROT UR-MCNC: NEGATIVE MG/DL — SIGNIFICANT CHANGE UP
PROTHROM AB SERPL-ACNC: 11.5 SEC — SIGNIFICANT CHANGE UP (ref 9.5–13)
RBC # BLD: 3.15 M/UL — LOW (ref 3.8–5.2)
RBC # FLD: 16.3 % — HIGH (ref 10.3–14.5)
SODIUM SERPL-SCNC: 138 MMOL/L — SIGNIFICANT CHANGE UP (ref 135–145)
SP GR SPEC: 1.01 — SIGNIFICANT CHANGE UP (ref 1–1.03)
TROPONIN T, HIGH SENSITIVITY RESULT: <6 NG/L — SIGNIFICANT CHANGE UP
UROBILINOGEN FLD QL: 1 MG/DL — SIGNIFICANT CHANGE UP (ref 0.2–1)
WBC # BLD: 7.09 K/UL — SIGNIFICANT CHANGE UP (ref 3.8–10.5)
WBC # FLD AUTO: 7.09 K/UL — SIGNIFICANT CHANGE UP (ref 3.8–10.5)

## 2024-08-08 PROCEDURE — 93010 ELECTROCARDIOGRAM REPORT: CPT

## 2024-08-08 PROCEDURE — 99285 EMERGENCY DEPT VISIT HI MDM: CPT

## 2024-08-08 PROCEDURE — 71046 X-RAY EXAM CHEST 2 VIEWS: CPT | Mod: 26

## 2024-08-08 PROCEDURE — 71275 CT ANGIOGRAPHY CHEST: CPT | Mod: 26,MC

## 2024-08-08 RX ORDER — OXYCODONE HYDROCHLORIDE 30 MG/1
1 TABLET ORAL
Qty: 4 | Refills: 0
Start: 2024-08-08 | End: 2024-08-08

## 2024-08-08 RX ORDER — DIPHENHYDRAMINE HCL 25 MG
25 CAPSULE ORAL ONCE
Refills: 0 | Status: COMPLETED | OUTPATIENT
Start: 2024-08-08 | End: 2024-08-08

## 2024-08-08 RX ORDER — BACTERIOSTATIC SODIUM CHLORIDE 0.9 %
2000 VIAL (ML) INJECTION ONCE
Refills: 0 | Status: COMPLETED | OUTPATIENT
Start: 2024-08-08 | End: 2024-08-08

## 2024-08-08 RX ORDER — CYCLOBENZAPRINE HCL 10 MG
5 TABLET ORAL ONCE
Refills: 0 | Status: DISCONTINUED | OUTPATIENT
Start: 2024-08-08 | End: 2024-08-08

## 2024-08-08 RX ORDER — OXYCODONE HYDROCHLORIDE 30 MG/1
5 TABLET ORAL ONCE
Refills: 0 | Status: DISCONTINUED | OUTPATIENT
Start: 2024-08-08 | End: 2024-08-08

## 2024-08-08 RX ORDER — ACETAMINOPHEN 500 MG
1000 TABLET ORAL ONCE
Refills: 0 | Status: COMPLETED | OUTPATIENT
Start: 2024-08-08 | End: 2024-08-08

## 2024-08-08 RX ORDER — METHOCARBAMOL 500 MG
750 TABLET ORAL ONCE
Refills: 0 | Status: COMPLETED | OUTPATIENT
Start: 2024-08-08 | End: 2024-08-08

## 2024-08-08 RX ADMIN — Medication 400 MILLIGRAM(S): at 15:26

## 2024-08-08 RX ADMIN — OXYCODONE HYDROCHLORIDE 5 MILLIGRAM(S): 30 TABLET ORAL at 16:57

## 2024-08-08 RX ADMIN — Medication 1000 MILLIGRAM(S): at 16:00

## 2024-08-08 RX ADMIN — Medication 750 MILLIGRAM(S): at 16:05

## 2024-08-08 RX ADMIN — Medication 25 MILLIGRAM(S): at 16:05

## 2024-08-08 RX ADMIN — Medication 2000 MILLILITER(S): at 15:59

## 2024-08-08 RX ADMIN — Medication 2000 MILLILITER(S): at 13:25

## 2024-08-08 NOTE — ED ADULT TRIAGE NOTE - CHIEF COMPLAINT QUOTE
c/o feeling fatigued, dizzy and having near syncope at  group home today. Endorses lower abd pain s/p appendectomy on Monday. denies fever, chills, tender to RLQ. Phx of DM type 2, bipolar, schizophrenia.

## 2024-08-08 NOTE — ED PROVIDER NOTE - ABDOMINAL EXAM
+ 3 well healing laparoscopic incisions appreciated on exam, no erythema or drainage visualized/soft/tender...

## 2024-08-08 NOTE — ED PROVIDER NOTE - PATIENT PORTAL LINK FT
You can access the FollowMyHealth Patient Portal offered by [facility] by registering at the following website: [email or web address]. By joining [facility]’s FollowMyHealth portal, you will also be able to view your health information using other applications (apps) compatible with our system.

## 2024-08-08 NOTE — ED ADULT NURSE NOTE - OBJECTIVE STATEMENT
c/o feeling fatigued, dizzy and having near syncope at  group home today. Endorses lower abd pain s/p appendectomy on Monday. denies fever, chills, tender to RLQ.   Patient brought to room 26 with the above complaints. Patient evaluated by medical provider and labs drawn and sent. IV lock placed by ultrasound 20 gauge. Patient is comfortable but tender with some abdominal discomfort. Patient waiting for further testing, results and disposition.  ALEYDA Estrada

## 2024-08-08 NOTE — PROVIDER CONTACT NOTE (OTHER) - ASSESSMENT
Pt is cleared to return back [facility] at 01-14 [address] [place] [address]. Spoke with Ms. Zhao at [phone] and was told to send pt via Taxi, pt travels Independently with no supervision needed. RAVINDRA arranged. RAVINDRA arranged [employer] taxi [phone]. MAS Inv# [id number], P/U 6:30. Verbal Huddle completed.

## 2024-08-08 NOTE — ED PROVIDER NOTE - CLINICAL SUMMARY MEDICAL DECISION MAKING FREE TEXT BOX
Pt is a 38 y/o female with a pmhx of Factor 5 Leiden, on Eliquis, DM, hypothyroidism, bipolar II disorder, schizophrenia, epilepsy, and asthma, s/p appendectomy on 7/5/24 is p/w weakness, dizziness, blurry vision, and lightheadedness x 9 am this morning.   Vitals; /64 HR: 88 Temp: 98.8 RR: 20 O2 sat: 99 Pt is a 40 y/o female with a pmhx of Factor 5 Leiden, on Eliquis, DM, hypothyroidism, bipolar II disorder, schizophrenia, epilepsy, and asthma, s/p appendectomy on 7/5/24 is p/w weakness, dizziness, blurry vision, and lightheadedness x 9 am this morning with associated urinary urgency and frequency and lower back pain   Vitals; /64 HR: 88 Temp: 98.8 RR: 20 O2 sat: 99  PE:   Concern for postoperative bleed s/p appendectomy, PE, pyelonephritis, Pt is a 38 y/o female with a pmhx of Factor 5 Leiden, on Eliquis, DM, hypothyroidism, bipolar II disorder, schizophrenia, epilepsy, and asthma, s/p appendectomy on 7/5/24 is p/w weakness, dizziness, blurry vision, and lightheadedness x 9 am this morning with associated urinary urgency and frequency and lower back pain   Vitals; /64 HR: 88 Temp: 98.8 RR: 20 O2 sat: 99  Concern for postoperative bleed s/p appendectomy, PE, pyelonephritis,

## 2024-08-08 NOTE — ED ADULT NURSE NOTE - PRO INTERPRETER NEED 2
[facility] for [facility]

## 2024-08-08 NOTE — ED ADULT NURSE NOTE - NS ED NOTE ABUSE RESPONSE YN
Patient Education        Ear Infections (Otitis Media) in Children: Care Instructions  Overview     A frequent kind of ear infection in children is called otitis media. This is an infection behind the eardrum. It usually starts with a cold. Ear infections can hurt a lot. Children with ear infections often fuss and cry, pull at their ears, and sleep poorly. Older children will often tell you that their ear hurts. Most children will have at least one ear infection. Fortunately, children usually outgrow them, often about the time they enter grade school. Your doctor may prescribe antibiotics to treat ear infections. Antibiotics aren't always needed, especially in older children who aren't very sick. Your doctor will discuss treatment with you based on your child and his or her symptoms. Regular doses of pain medicine are the best way to reduce fever and help your child feel better. Follow-up care is a key part of your child's treatment and safety. Be sure to make and go to all appointments, and call your doctor if your child is having problems. It's also a good idea to know your child's test results and keep a list of the medicines your child takes. How can you care for your child at home? · Give your child acetaminophen (Tylenol) or ibuprofen (Advil, Motrin) for fever, pain, or fussiness. Be safe with medicines. Read and follow all instructions on the label. Do not give aspirin to anyone younger than 20. It has been linked to Reye syndrome, a serious illness. · If the doctor prescribed antibiotics for your child, give them as directed. Do not stop using them just because your child feels better. Your child needs to take the full course of antibiotics. · Place a warm washcloth on your child's ear for pain. · Encourage rest. Resting will help the body fight the infection. Arrange for quiet play activities. When should you call for help? Call 911 anytime you think your child may need emergency care.  For example, call if:    · Your child is confused, does not know where he or she is, or is extremely sleepy or hard to wake up. Call your doctor now or seek immediate medical care if:    · Your child seems to be getting much sicker.     · Your child has a new or higher fever.     · Your child's ear pain is getting worse.     · Your child has redness or swelling around or behind the ear. Watch closely for changes in your child's health, and be sure to contact your doctor if:    · Your child has new or worse discharge from the ear.     · Your child is not getting better after 2 days (48 hours).     · Your child has any new symptoms, such as hearing problems after the ear infection has cleared. Where can you learn more? Go to [email or web address] org and sign in to your [employer] account. Enter [phone] in the [facility] box to learn more about \"Ear Infections (Otitis Media) in Children: Care Instructions. \"     If you do not have an account, please click on the \"Sign Up Now\" link. Current as of: December 2, 2020               Content Version: 13.0  © 2006-2021 [employer]wise[employer] Inc[employer]. Care instructions adapted under license by [facility] ([facility]). If you have questions about a medical condition or this instruction, always ask your healthcare professional. [address] any warranty or liability for your use of this information. Yes

## 2024-08-08 NOTE — ED PROVIDER NOTE - NS ED ATTENDING STATEMENT MOD
I have seen and examined this patient and fully participated in the care of this patient as the teaching attending.  The service was shared with the MISAEL.  I reviewed and verified the documentation.

## 2024-08-08 NOTE — ED ADULT NURSE REASSESSMENT NOTE - NS ED NURSE REASSESS COMMENT FT1
break coverage rn. received report from ALEYDA sanchez. pt A&Ox4, vitally stable. endorsing increase in RLQ abdominal pain JORGE L Bang made aware. pt given PO pain meds. Passed PO challenged. safety maintained awaiting social work

## 2024-08-08 NOTE — ED PROVIDER NOTE - PROGRESS NOTE DETAILS
JORGE L CHANCE:  Pt tolerating PO.  Lab and radiology results nonactionable.  BP improved.  Pt medically stable for discharge.  Strict return precautions given.  Pt to follow up with PMD and her surgeon.  Reassessment performed and plan for discharge discussed with Dr. Goodrich who agrees with disposition and discharge plan. Mat Owens MD PGY-3: surgery signed off, not cellulitis no need for keflex. Will PO challenge and plan for DC.

## 2024-08-08 NOTE — ED ADULT NURSE REASSESSMENT NOTE - NS ED NURSE REASSESS COMMENT FT1
ED focused US guided PIV placement by Registered Nurse.      Indication - poor access.      Findings: compressible right AC vein.  Using direct US guidance, under clean conditions, a long 20 Ga peripheral catheter introduced into vessel.  US performed after procedure, functional catheter in vein, no complications.     Impression:  successful US guided right AC PIV placement.

## 2024-08-08 NOTE — ED PROVIDER NOTE - NSFOLLOWUPINSTRUCTIONS_ED_ALL_ED_FT
Advance activity as tolerated.  Continue all previously prescribed medications as directed unless otherwise instructed.  Take Tylenol 650mg (Two 325 mg pills) every 4-6 hours as needed for pain.  Take oxycodone 5 mg one pill once every 6 hours as needed for severe pain -- causes drowsiness; DO NOT drink alcohol, drive, or operate heavy machinery with this medication.  Caution federal law prohibits the transfer of this drug to any person other  than the person for whom it was prescribed. May cause drowsiness.  Alcohol may intensify this effect.  Use care when operating dangerous machinery.  This prescription cannot be refilled. Using more of this medication than prescribed may cause serious breathing problems.  Follow up with your primary care physician in 48-72 hours- bring copies of your results.  Return to the Emergency Department for worsening or persistent symptoms, including but not limited to worsening/persistent pain, fevers, vomiting, chest pain, black or bloody stools, fevers, passing out, lightheadedness, dizziness, inability to pass bowel movements or gas, abdominal distension/swelling, and/or ANY NEW OR CONCERNING SYMPTOMS. If you have issues obtaining follow up, please call: [phone] to obtain a doctor or specialist who takes your insurance in your area.  You may call [phone] to make an appointment with the internal medicine clinic.    ABDOMINAL PAIN - General Information    Abdominal Pain    WHAT YOU NEED TO KNOW:    What do I need to know about abdominal pain? Abdominal pain may be felt anywhere between the bottom of your rib cage and your groin. Acute pain usually lasts less than 3 months. Chronic pain lasts longer than 3 months. Your pain may be sharp or dull. The pain may stay in the same place or move around. You may have the pain all the time, or it may come and go. Depending on the cause, you may also have nausea, vomiting, fever, or diarrhea.  Abdominal Organs    What causes abdominal pain? The cause may not be found. The following are common causes:    Overeating, gas pains, or food poisoning    Constipation or diarrhea    An injury    Appendicitis, a hernia, or an ulcer    Infection or a blockage    A liver, gallbladder, or kidney condition  How is the cause of abdominal pain diagnosed? Your healthcare provider will check your abdomen. He or she will ask where your pain is and when it started. Tell him or her if the pain wakes you or stops you from doing your daily activities. Describe anything that makes the pain better or worse. You may also need any of the following:    Blood, urine, or bowel movement samples may be tested for signs of an infection, disease, or injury.    X-ray pictures of your abdomen may show an injury or other cause of the pain.  How is abdominal pain treated?    Prescription pain medicine may be given. Ask your healthcare provider how to take this medicine safely. Some prescription pain medicines contain acetaminophen. Do not take other medicines that contain acetaminophen without talking to your healthcare provider. Too much acetaminophen may cause liver damage. Prescription pain medicine may cause constipation. Ask your healthcare provider how to prevent or treat constipation.    Medicines may be given to calm your stomach or prevent vomiting.    Relaxation therapy may be used along with pain medicine.    Surgery may be needed, depending on the cause.  What can I do to manage or prevent abdominal pain?    Apply heat on your abdomen for 20 to 30 minutes every 2 hours for as many days as directed. Heat helps decrease pain and muscle spasms.    Make changes to the foods you eat, if needed. Do not eat foods that cause abdominal pain or other symptoms. Eat small meals more often. The following changes may also help:  Eat more high-fiber foods if you are constipated. High-fiber foods include fruits, vegetables, whole-grain foods, and legumes such as stein beans.        Do not eat foods that cause gas if you have bloating. Examples include broccoli, cabbage, beans, and carbonated drinks.    Do not eat foods or drinks that contain sorbitol or fructose if you have diarrhea and bloating. Some examples are fruit juices, candy, jelly, and sugar-free gum.    Do not eat high-fat foods. Examples include fried foods, cheeseburgers, hot dogs, and desserts.    Make changes to the liquids you drink, if needed. Do not drink liquids that cause pain or make it worse, such as orange juice. Drink liquids throughout the day to stay hydrated. The following changes may also help:  Drink more liquids to prevent dehydration from diarrhea or vomiting. Ask your healthcare provider how much liquid to drink each day and which liquids are best for you.    Limit or do not have caffeine. Caffeine may make symptoms such as heartburn or nausea worse.    Limit or do not drink alcohol. Alcohol can make your abdominal pain worse. Ask your healthcare provider if it is okay for you to drink alcohol. Also ask how much is okay for you to drink. A drink of alcohol is 12 ounces of beer, ½ ounce of liquor, or 5 ounces of wine.    Keep a diary of your abdominal pain. A diary may help your healthcare provider learn what is causing your pain. Include when the pain happens, how long it lasts, and what the pain feels like. Write down any other symptoms you have with abdominal pain. Also write down what you eat, and any symptoms you have after you eat.    Manage stress. Stress may cause abdominal pain. Your healthcare provider may recommend relaxation techniques and deep breathing exercises to help decrease your stress. Your healthcare provider may recommend you talk to someone about your stress or anxiety, such as a counselor or a friend. Get plenty of sleep. Exercise regularly.  [employer] Walking for Exercise      Do not smoke. Nicotine and other chemicals in cigarettes can damage your esophagus and stomach. Ask your healthcare provider for information if you currently smoke and need help to quit. E-cigarettes or smokeless tobacco still contain nicotine. Talk to your healthcare provider before you use these products.  Call your local emergency number (911 in the [place]) if:    You have chest pain or shortness of breath.    When should I seek immediate care?    You have pulsing pain in your upper abdomen or lower back that suddenly becomes constant.    Your pain is in the right lower abdominal area and worsens with movement.    You have a fever over 100.4°F (38°C) or shaking chills.    You are vomiting and cannot keep food or liquids down.    Your pain does not improve or gets worse over the next 8 to 12 hours.    You see blood in your vomit or bowel movements, or they look black and tarry.    Your skin or the whites of your eyes turn yellow.    You are a woman and have a large amount of vaginal bleeding that is not your monthly period.  When should I call my doctor?    You have pain in your lower back.    You are a man and have pain in your testicles.    You have pain when you urinate.    You have questions or concerns about your condition or care.  CARE AGREEMENT:    You have the right to help plan your care. Learn about your health condition and how it may be treated. Discuss treatment options with your healthcare providers to decide what care you want to receive. You always have the right to refuse treatment.    © [employer] 1973, 2023 \    DYSPNEA - General Information    Dyspnea    WHAT YOU NEED TO KNOW:    What is dyspnea? Dyspnea is breathing difficulty or discomfort. You may have labored, painful, or shallow breathing. You may feel breathless or short of breath. Dyspnea can occur during rest or with activity. You may have dyspnea for a short time, or it might become chronic. Dyspnea is often a symptom of a disease or condition.    What signs and symptoms can occur with dyspnea?    Chest tightness or pain    A cough, or a coarse or high-pitched noise when you breathe    Pale and sweaty, cool skin    Confusion and tiredness    Bluish-gray lips or nails  What increases my risk for dyspnea?    Swelling in the throat from an infection or allergic reaction    Lung conditions such as asthma, COPD, cancer, infection, or a blood clot    Heart conditions such as abnormal heartbeats, heart failure, or coronary artery disease    Smoking, exposure to chemicals such as carbon monoxide, or too much aspirin    A condition that affects your central nervous system, such as a spinal cord injury or nerve damage    Enlarged abdomen because you are overweight, pregnant, or have ascites (fluid in the abdomen) from liver disease    Anemia (low red blood cell count), anxiety, panic, or going to a high altitude  How is the cause of dyspnea diagnosed? Your healthcare provider may ask when your dyspnea began and what you were doing. Tell him or her how often you have dyspnea and what makes it worse or better. Tell your healthcare provider about medicines you take. Describe any other symptoms, such as pain or a fever. Your healthcare provider will listen to you breathe and watch for irregular breathing. You may also need the following:    A pulse oximeter is a device that measures the amount of oxygen in your blood. A cord with a clip or sticky strip is placed on your finger, ear, or toe. The other end of the cord is hooked to a machine.    Blood tests may show your healthcare provider if you are at risk for blood clots or heart failure. Blood tests can also show if you have anemia or an infection.    X-ray pictures may show signs of infection or fluid around your heart or lungs.    Exercise tests help your healthcare provider learn if you have symptoms, along with dyspnea, that limit activity. Symptoms include leg pain, fatigue, and weakness. Exercise tests can also show if your dyspnea is caused by heart problems.    CT scan pictures may show blood clots or an area of disease in your lungs. You may be given contract liquid to help your lungs show up better in the pictures. Tell the healthcare provider if you have ever had an allergic reaction to contrast liquid.    An echocardiogram is a type of ultrasound. Sound waves are used to show the structure and function of your heart.    An EKG is a test that measures the electrical activity of your heart.  How is dyspnea treated? You may need treatment if your symptoms prevent you from doing your daily activities. The condition causing your dyspnea may need to be treated. You may also need the following to improve your symptoms:    Oxygen therapy may be used to help you breathe easier. You may need oxygen if your blood oxygen level is lower than it should be.    Medicines may be used to treat the cause of your dyspnea. Medicines may reduce swelling in your airway or decrease extra fluid from around your heart or lungs. Other medicines may be used to decrease anxiety and help you feel calm and relaxed.    Pulmonary rehabilitation is used to reduce your symptoms while keeping you active. You may learn breathing techniques, muscle strengthening, and how to pace yourself when you are active.  How can I manage long-term dyspnea?    Create an action plan. You and your healthcare provider can work together to create a plan for how to handle episodes of dyspnea. The plan can include daily activities, treatment changes, and what to do if you have severe breathing problems.    Lean forward on your elbows when you sit. This helps your lungs expand and may make it easier to breathe.    Use pursed-lip breathing any time you feel short of breath. Breathe in through your nose and then slowly breathe out through your mouth with your lips slightly puckered. It should take you twice as long to breathe out as it did to breathe in.  Breathe in Breathe out      Do not smoke. Nicotine and other chemicals in cigarettes and cigars can cause lung damage and make it harder to breathe. Ask your healthcare provider for information if you currently smoke and need help to quit. E-cigarettes or smokeless tobacco still contain nicotine. Talk to your healthcare provider before you use these products.    Reach or maintain a healthy weight. Your healthcare provider can help you create a safe weight loss plan if you are overweight.    Exercise as directed. Exercise can help your lungs work more easily. Exercise can also help you lose weight if needed. Try to get at least 30 minutes of exercise most days of the week. Your healthcare provider can help you create an exercise plan that is safe for you.  When should I seek immediate care?    Your signs and symptoms are the same or worse within 24 hours of treatment.    You have shaking chills or a fever over 102°F.    You have new pain, pressure, or tightness in your chest.    You have a new or worse cough or wheezing, or you cough up blood.    You feel like you cannot get enough air.    The skin over your ribs or on your neck sinks in when you breathe.    You have a severe headache with vomiting and abdominal pain.    You feel confused or dizzy.  When should I call my doctor or specialist?    You have questions or concerns about your condition or care.    CARE AGREEMENT:    You have the right to help plan your care. Learn about your health condition and how it may be treated. Discuss treatment options with your healthcare providers to decide what care you want to receive. You always have the right to refuse treatment.    © [employer] 1973, 2023     LIGHTHEADEDNESS - AfterCare(R) Instructions(ER/ED)    Lightheadedness    WHAT YOU NEED TO KNOW:    Lightheadedness is the feeling that you may faint, but you do not. Your heartbeat may be fast or feel like it flutters. Lightheadedness may occur when you take certain medicines, such as medicine to lower your blood pressure. Dehydration, low sodium, low blood sugar, an abnormal heart rhythm, and anxiety are other common causes.    DISCHARGE INSTRUCTIONS:    Return to the emergency department if:    You have sudden chest pain.    You have trouble breathing or shortness of breath.    You have vision changes, are sweating, and have nausea while you are sitting or lying down.    You feel flushed and your heart is fluttering.    You faint.  Contact your healthcare provider if:    You feel lightheaded often.    Your heart beats faster or slower than usual.    You have questions or concerns about your condition or care.  Follow up with your healthcare provider as directed: You may need more tests to help find the cause of your lightheadedness. The tests will help healthcare providers plan the best treatment for you. Write down your questions so you remember to ask them during your visits.    Self-care: Talk with your healthcare provider about these and other ways to manage your symptoms:    Lie down when you feel lightheaded, your throat gets tight, or your vision changes. Raise your legs above the level of your heart.    Stand up slowly. Sit on the side of the bed or couch for a few minutes before you stand up.    Take slow, deep breaths when you feel lightheaded. This can help decrease the feeling that you might faint.    Ask if you need to avoid hot baths and saunas. These may make your symptoms worse.  Watch for signs of low blood sugar: These include hunger, nervousness, sweating, and fast or fluttery heartbeats. Talk with your healthcare provider about ways to keep your blood sugar level steady.    Check your blood pressure often: You should do this especially if you take medicine to lower your blood pressure. Check your blood pressure when you are lying down and when you are standing. Ask how often to check during the day. Keep a record of your blood pressure numbers. Your healthcare provider may use the record to help plan your treatment.    Keep a record of your lightheadedness episodes: Include your symptoms and your activity before and after the episode. The record can help your healthcare provider find the cause of your lightheadedness and help you manage episodes.    © [employer] 1973, 2023

## 2024-08-08 NOTE — ED PROVIDER NOTE - ATTENDING CONTRIBUTION TO CARE
The patient is a 39y Female who has a past medical and surgery history of HTN DM GERD Asthma Bipolar disorder Seizure Factor V Leiden/Eliquis  Endometriosis Hypothyroid Insomnia PTED c/o  fatigue, dizzy and having near syncope at  group home today. Endorses lower abd pain s/p appendectomy on Monday. denies fever, chills, tender to RLQ.    Vital Signs Last 24 Hrs  T(F): 98.8 HR: 88 BP: 101/69 RR: 20 SpO2: 99% (08 Aug 2024 12:36) (99% - 99%)  PE: as described; my additions and exceptions are noted in the chart    DATA:  EKG: pending at time of evaluation  LAB: Pending at time of evaluation    IMPRESSION/RISK:  Dx= Near Syncope    Consideration include Post op infection possible but clear and present is risk of PE given probable d/c of meds while periop and ? restart after DC with STANTON Lopez  Plan  labs; DDimer useless in preop setting   ivfs   ua   CTSPEP and A/p   reassess  Dispo as per results and response

## 2024-08-08 NOTE — ED PROVIDER NOTE - OBJECTIVE STATEMENT
Pt is a 38 y/o female with a pmhx of Factor 5 Leiden, on Eliquis, DM, hypothyroidism, bipolar II disorder, schizophrenia, epilepsy asthma, Pt is a 38 y/o female with a pmhx of Factor 5 Leiden, on Eliquis, DM, hypothyroidism, bipolar II disorder, schizophrenia, epilepsy, and asthma, s/p appendectomy on 7/5/24 is p/w weakness, dizziness, blurry vision, and lightheadedness x 9 am this morning. Pt lives in a group home and states that multiple blood pressure readings were performed which were low. (88/60s, 99/50s, 107/30s). Pt is also endorsing urinary frequency x 3 days. Pt was recently admitted 2 weeks ago at [facility] for similar sxs, was found to have urosepsis, was admitted for 5 days an IV abx were started. Pt states that she has been passing flatus since the surgery but has not had a bowel movement since last week Friday. Pt denies N/V/D

## 2024-08-11 ENCOUNTER — EMERGENCY (EMERGENCY)
Facility: HOSPITAL | Age: 39
LOS: 1 days | Discharge: HOME | End: 2024-08-11
Attending: STUDENT IN AN ORGANIZED HEALTH CARE EDUCATION/TRAINING PROGRAM | Admitting: EMERGENCY MEDICINE
Payer: MEDICARE

## 2024-08-11 VITALS
TEMPERATURE: 98 F | RESPIRATION RATE: 17 BRPM | HEIGHT: 68 IN | SYSTOLIC BLOOD PRESSURE: 120 MMHG | OXYGEN SATURATION: 100 % | DIASTOLIC BLOOD PRESSURE: 86 MMHG | WEIGHT: 229.94 LBS | HEART RATE: 90 BPM

## 2024-08-11 VITALS
HEART RATE: 78 BPM | RESPIRATION RATE: 16 BRPM | DIASTOLIC BLOOD PRESSURE: 74 MMHG | SYSTOLIC BLOOD PRESSURE: 125 MMHG | OXYGEN SATURATION: 100 % | TEMPERATURE: 98 F

## 2024-08-11 LAB
ALBUMIN SERPL ELPH-MCNC: 4 G/DL — SIGNIFICANT CHANGE UP (ref 3.3–5)
ALP SERPL-CCNC: 63 U/L — SIGNIFICANT CHANGE UP (ref 40–120)
ALT FLD-CCNC: 40 U/L — HIGH (ref 4–33)
ANION GAP SERPL CALC-SCNC: 16 MMOL/L — HIGH (ref 7–14)
APPEARANCE UR: ABNORMAL
AST SERPL-CCNC: 25 U/L — SIGNIFICANT CHANGE UP (ref 4–32)
BACTERIA # UR AUTO: ABNORMAL /HPF
BASOPHILS # BLD AUTO: 0.02 K/UL — SIGNIFICANT CHANGE UP (ref 0–0.2)
BASOPHILS NFR BLD AUTO: 0.4 % — SIGNIFICANT CHANGE UP (ref 0–2)
BILIRUB SERPL-MCNC: 0.3 MG/DL — SIGNIFICANT CHANGE UP (ref 0.2–1.2)
BILIRUB UR-MCNC: NEGATIVE — SIGNIFICANT CHANGE UP
BUN SERPL-MCNC: 11 MG/DL — SIGNIFICANT CHANGE UP (ref 7–23)
CALCIUM SERPL-MCNC: 9.5 MG/DL — SIGNIFICANT CHANGE UP (ref 8.4–10.5)
CHLORIDE SERPL-SCNC: 103 MMOL/L — SIGNIFICANT CHANGE UP (ref 98–107)
CO2 SERPL-SCNC: 19 MMOL/L — LOW (ref 22–31)
COLOR SPEC: YELLOW — SIGNIFICANT CHANGE UP
COMMENT - URINE: SIGNIFICANT CHANGE UP
CREAT SERPL-MCNC: 0.98 MG/DL — SIGNIFICANT CHANGE UP (ref 0.5–1.3)
DIFF PNL FLD: ABNORMAL
EGFR: 76 ML/MIN/1.73M2 — SIGNIFICANT CHANGE UP
EOSINOPHIL # BLD AUTO: 0.01 K/UL — SIGNIFICANT CHANGE UP (ref 0–0.5)
EOSINOPHIL NFR BLD AUTO: 0.2 % — SIGNIFICANT CHANGE UP (ref 0–6)
EPI CELLS # UR: PRESENT
GLUCOSE SERPL-MCNC: 83 MG/DL — SIGNIFICANT CHANGE UP (ref 70–99)
GLUCOSE UR QL: NEGATIVE MG/DL — SIGNIFICANT CHANGE UP
HCG SERPL-ACNC: <1 MIU/ML — SIGNIFICANT CHANGE UP
HCT VFR BLD CALC: 33.3 % — LOW (ref 34.5–45)
HGB BLD-MCNC: 10.7 G/DL — LOW (ref 11.5–15.5)
IANC: 2.86 K/UL — SIGNIFICANT CHANGE UP (ref 1.8–7.4)
IMM GRANULOCYTES NFR BLD AUTO: 0.5 % — SIGNIFICANT CHANGE UP (ref 0–0.9)
KETONES UR-MCNC: ABNORMAL MG/DL
LEUKOCYTE ESTERASE UR-ACNC: NEGATIVE — SIGNIFICANT CHANGE UP
LIDOCAIN IGE QN: 22 U/L — SIGNIFICANT CHANGE UP (ref 7–60)
LYMPHOCYTES # BLD AUTO: 1.97 K/UL — SIGNIFICANT CHANGE UP (ref 1–3.3)
LYMPHOCYTES # BLD AUTO: 35.2 % — SIGNIFICANT CHANGE UP (ref 13–44)
MCHC RBC-ENTMCNC: 31.1 PG — SIGNIFICANT CHANGE UP (ref 27–34)
MCHC RBC-ENTMCNC: 32.1 GM/DL — SIGNIFICANT CHANGE UP (ref 32–36)
MCV RBC AUTO: 96.8 FL — SIGNIFICANT CHANGE UP (ref 80–100)
MONOCYTES # BLD AUTO: 0.71 K/UL — SIGNIFICANT CHANGE UP (ref 0–0.9)
MONOCYTES NFR BLD AUTO: 12.7 % — SIGNIFICANT CHANGE UP (ref 2–14)
NEUTROPHILS # BLD AUTO: 2.86 K/UL — SIGNIFICANT CHANGE UP (ref 1.8–7.4)
NEUTROPHILS NFR BLD AUTO: 51 % — SIGNIFICANT CHANGE UP (ref 43–77)
NITRITE UR-MCNC: NEGATIVE — SIGNIFICANT CHANGE UP
NRBC # BLD: 0 /100 WBCS — SIGNIFICANT CHANGE UP (ref 0–0)
NRBC # FLD: 0 K/UL — SIGNIFICANT CHANGE UP (ref 0–0)
PH UR: 7 — SIGNIFICANT CHANGE UP (ref 5–8)
PLATELET # BLD AUTO: 124 K/UL — LOW (ref 150–400)
POTASSIUM SERPL-MCNC: 4.4 MMOL/L — SIGNIFICANT CHANGE UP (ref 3.5–5.3)
POTASSIUM SERPL-SCNC: 4.4 MMOL/L — SIGNIFICANT CHANGE UP (ref 3.5–5.3)
PROT SERPL-MCNC: 7.3 G/DL — SIGNIFICANT CHANGE UP (ref 6–8.3)
PROT UR-MCNC: 100 MG/DL
RBC # BLD: 3.44 M/UL — LOW (ref 3.8–5.2)
RBC # FLD: 16.2 % — HIGH (ref 10.3–14.5)
RBC CASTS # UR COMP ASSIST: 5 /HPF — SIGNIFICANT CHANGE UP (ref 0–4)
SODIUM SERPL-SCNC: 138 MMOL/L — SIGNIFICANT CHANGE UP (ref 135–145)
SP GR SPEC: 1.02 — SIGNIFICANT CHANGE UP (ref 1–1.03)
UROBILINOGEN FLD QL: 1 MG/DL — SIGNIFICANT CHANGE UP (ref 0.2–1)
WBC # BLD: 5.6 K/UL — SIGNIFICANT CHANGE UP (ref 3.8–10.5)
WBC # FLD AUTO: 5.6 K/UL — SIGNIFICANT CHANGE UP (ref 3.8–10.5)
WBC UR QL: 1 /HPF — SIGNIFICANT CHANGE UP (ref 0–5)

## 2024-08-11 PROCEDURE — 99285 EMERGENCY DEPT VISIT HI MDM: CPT

## 2024-08-11 PROCEDURE — 74177 CT ABD & PELVIS W/CONTRAST: CPT | Mod: 26,MC

## 2024-08-11 PROCEDURE — 93010 ELECTROCARDIOGRAM REPORT: CPT

## 2024-08-11 RX ORDER — OXYCODONE AND ACETAMINOPHEN 10; 325 MG/1; MG/1
1 TABLET ORAL
Qty: 4 | Refills: 0
Start: 2024-08-11 | End: 2024-08-11

## 2024-08-11 RX ORDER — ACETAMINOPHEN 500 MG
1000 TABLET ORAL ONCE
Refills: 0 | Status: COMPLETED | OUTPATIENT
Start: 2024-08-11 | End: 2024-08-11

## 2024-08-11 RX ORDER — ONDANSETRON HCL/PF 4 MG/2 ML
1 VIAL (ML) INJECTION
Qty: 6 | Refills: 0
Start: 2024-08-11 | End: 2024-08-12

## 2024-08-11 RX ORDER — DIPHENHYDRAMINE HCL 25 MG
50 CAPSULE ORAL ONCE
Refills: 0 | Status: COMPLETED | OUTPATIENT
Start: 2024-08-11 | End: 2024-08-11

## 2024-08-11 RX ORDER — BACTERIOSTATIC SODIUM CHLORIDE 0.9 %
1000 VIAL (ML) INJECTION ONCE
Refills: 0 | Status: COMPLETED | OUTPATIENT
Start: 2024-08-11 | End: 2024-08-11

## 2024-08-11 RX ADMIN — Medication 6 MILLIGRAM(S): at 18:51

## 2024-08-11 RX ADMIN — Medication 50 MILLIGRAM(S): at 16:16

## 2024-08-11 RX ADMIN — Medication 4 MILLIGRAM(S): at 16:12

## 2024-08-11 RX ADMIN — Medication 400 MILLIGRAM(S): at 16:12

## 2024-08-11 RX ADMIN — Medication 4 MILLIGRAM(S): at 18:30

## 2024-08-11 RX ADMIN — Medication 1000 MILLIGRAM(S): at 18:30

## 2024-08-11 RX ADMIN — Medication 1000 MILLILITER(S): at 16:13

## 2024-08-11 NOTE — ED PROVIDER NOTE - ATTENDING CONTRIBUTION TO CARE
I have discussed the patient's case presentation with resident. I have also personally performed a face-to-face evaluation of the patient. I agree with the resident's assessment and plan.

## 2024-08-11 NOTE — ED ADULT NURSE NOTE - NSFALLRISKINTERV_ED_ALL_ED

## 2024-08-11 NOTE — ED PROCEDURE NOTE - PROCEDURE ADDITIONAL DETAILS
Peripheral IV access in the Emergency Department obtained under dynamic ultrasound guidance with dark nonpulsatile blood return.  Catheter was flushed afterwards without any resistance or resultant extravasation.  IV catheter confirmed in compressible vein after insertion.

## 2024-08-11 NOTE — ED ADULT NURSE REASSESSMENT NOTE - NS ED NURSE REASSESS COMMENT FT1
Break RN: Pt received in stretcher in trauma room C. Pt returned from CT scan. Endorses abdominal pain, ofirmev infusing as per ordered. respiration even and non-labored. in NAD. Pending CT result
Report given by previous RN. Pt lying in bed comfortably. Pt reports that the pain meds worked a little bit but now has some right-sided ear pain MD Owens made aware. Pt breathing is equal and nonlabored. pt waiting for surg recs. Pt safety maintained.

## 2024-08-11 NOTE — ED PROVIDER NOTE - NSFOLLOWUPINSTRUCTIONS_ED_ALL_ED_FT
You were evaluated in the emergency department for abdominal pain. Your results were discussed with you and are attached. You were seen by our surgery team. You are cleared fro discharge at this time. Medications were sent to your pharmacy. Please  and take as directed. See your regular doctor within 1 week for follow up.     Abdominal Pain    Many things can cause abdominal pain. Many times, abdominal pain is not caused by a disease and will improve without treatment. Your health care provider will do a physical exam to determine if there is a dangerous cause of your pain; blood tests and imaging may help determine the cause of your pain. However, in many cases, no cause may be found and you may need further testing as an outpatient. Monitor your abdominal pain for any changes.     SEEK IMMEDIATE MEDICAL CARE IF YOU HAVE ANY OF THE FOLLOWING SYMPTOMS: worsening abdominal pain, uncontrollable vomiting, profuse diarrhea, inability to have bowel movements or pass gas, black or bloody stools, fever accompanying chest pain or back pain, or fainting. These symptoms may represent a serious problem that is an emergency. Do not wait to see if the symptoms will go away. Get medical help right away. Call 911 and do not drive yourself to the hospital.

## 2024-08-11 NOTE — ED PROVIDER NOTE - PROGRESS NOTE DETAILS
Mat Owens MD PGY-3: Mat Owens MD PGY-3: surgery signed off, not cellulitis no need for keflex. Will PO challenge and plan for DC. Mat Owens MD PGY-3: po challenge passed. pt reports ate and felt slight nausea but no vomiting. no longer nauseous right now

## 2024-08-11 NOTE — PROVIDER CONTACT NOTE (OTHER) - BACKGROUND
Pt from [facility] Group Home located at [address] [place] [address]., phone #[phone].

## 2024-08-11 NOTE — ED ADULT NURSE NOTE - NS_SISCREENINGSR_GEN_ALL_ED
Show Aperture Variable?: Yes Number Of Freeze-Thaw Cycles: 1 freeze-thaw cycle Detail Level: Simple Application Tool (Optional): Liquid Nitrogen Sprayer Consent: The patient's consent was obtained including but not limited to risks of crusting, scabbing, blistering, scarring, darker or lighter pigmentary change, recurrence, incomplete removal and infection. Post-Care Instructions: I reviewed with the patient in detail post-care instructions. Patient is to wear sunprotection, and avoid picking at any of the treated lesions. Pt may apply Vaseline to crusted or scabbing areas. Render Post-Care Instructions In Note?: no Duration Of Freeze Thaw-Cycle (Seconds): 3 Negative

## 2024-08-11 NOTE — CONSULT NOTE ADULT - ASSESSMENT
no acute surgical interventions indicated  supportive care with pain control  continue Eliquis  patient should follow up with Dr. Zheng at her scheduled postoperative appointment    discussed with Dr. Walker Ms. 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 Zheng) presenting with LLQ pain and findings of omental infarction on CT.    Recommendations:  - no acute surgical intervention indicated  - supportive care with pain control  - no concern for umbilical cellulitis based on physical exam  - continue Eliquis  - patient should follow up with Dr. Zheng at her scheduled postoperative appointment    discussed with Dr. Walker

## 2024-08-11 NOTE — ED ADULT NURSE NOTE - CHIEF COMPLAINT QUOTE
Pt arrives from [facility] for abdominal pain. Pt states she had an appendectomy on monday however this morning she started experiencing LUQ abdominal pain that radiates down to the RLQ and LLQ. Pt endorsing increased urinary frequency. Endorsing dizziness. breathing even and unlabored. Hx of seizures, HTN, pre-diabetes.

## 2024-08-11 NOTE — ED PROVIDER NOTE - CLINICAL SUMMARY MEDICAL DECISION MAKING FREE TEXT BOX
39y F with recent hx of appendectomy 1w ago presenting for lower abd pain, worst in llq. Had one episode of vomiting yesterday. Reports she has been tolerating PO at home. Denies fevers, chills, diarrhea, dysuria.    VS WNL    GENERAL: NAD  EYES: EOMI, conjunctiva and sclera clear  ENT: Moist mucous membranes  CHEST/LUNG: Unlabored respirations. Clear to auscultation bilaterally. No rales, rhonchi, wheezing, or rubs  HEART: Regular rate and rhythm. No murmurs, rubs, or gallops  ABDOMEN: Soft, nondistended. TTP on LUQ/LLQ. +well-healing inferior midline incision, + well-healing LLQ incision. +non-purulent surgical incision at umbilicus with small area of induration inferior to scar.  EXTREMITIES:  No cyanosis or edema.   NERVOUS SYSTEM:  No focal deficits.     ddx inclue but not limited to intraabdominal infection, poor pain management at home, or other intraabdominal pathology 39y F with recent hx of appendectomy 1w ago presenting for lower abd pain, worst in llq. Had one episode of vomiting yesterday. Reports she has been tolerating PO at home. Denies fevers, chills, diarrhea, dysuria.    VS WNL    GENERAL: NAD  EYES: EOMI, conjunctiva and sclera clear  ENT: Moist mucous membranes  CHEST/LUNG: Unlabored respirations. Clear to auscultation bilaterally. No rales, rhonchi, wheezing, or rubs  HEART: Regular rate and rhythm. No murmurs, rubs, or gallops  ABDOMEN: Soft, nondistended. TTP on LUQ/LLQ. +well-healing inferior midline incision, + well-healing LLQ incision. +non-purulent surgical incision at umbilicus with small area of induration inferior to scar.  EXTREMITIES:  No cyanosis or edema.   NERVOUS SYSTEM:  No focal deficits.     ddx include but not limited to intraabdominal infection, poor pain management at home, or other intraabdominal pathology

## 2024-08-11 NOTE — CONSULT NOTE ADULT - SUBJECTIVE AND OBJECTIVE BOX
HISTORY OF PRESENT ILLNESS:      **full note in to follow, recommendations below**      PAST MEDICAL HISTORY: Asthma    Seizure    Factor V Leiden    Bipolar disorder    Endometriosis    History of DVT in adulthood    Seizures    Hypothyroid    Seasonal allergies    Insomnia    GERD (gastroesophageal reflux disease)    HTN (hypertension)    Diabetes    DVT (deep venous thrombosis)    Factor V Leiden        PAST SURGICAL HISTORY: No significant past surgical history    No significant past surgical history        HOME MEDICATIONS:    ALLERGIES: Zofran (Unknown)  latex (Blisters)  Toradol (Unknown)  TraZODone Hydrochloride (Unknown)  penicillin (Hives)  Toradol (Hives)  gabapentin (Unknown)  Zofran (Hives)  TraMADol Hydrochloride (Unknown)  ibuprofen (Pruritus)  [This allergen will not trigger allergy alert] Toradol SOLN (Unknown)  penicillin (Other)  Zofran (Rash)  trazodone (Other)  [This allergen will not trigger allergy alert] pineapple allergenic extract (Swelling)      FAMILY HISTORY: No pertinent family history in first degree relatives    Family history of DVT (Mother)        SOCIAL HISTORY:    REVIEW OF SYSTEMS:    VITAL SIGNS:  ICU Vital Signs Last 24 Hrs  T(C): 36.7 (11 Aug 2024 20:45), Max: 36.8 (11 Aug 2024 12:24)  T(F): 98.1 (11 Aug 2024 20:45), Max: 98.2 (11 Aug 2024 12:24)  HR: 85 (11 Aug 2024 20:45) (70 - 90)  BP: 118/86 (11 Aug 2024 20:45) (118/86 - 120/86)  BP(mean): --  ABP: --  ABP(mean): --  RR: 16 (11 Aug 2024 20:45) (16 - 18)  SpO2: 100% (11 Aug 2024 20:45) (98% - 100%)    O2 Parameters below as of 11 Aug 2024 20:45  Patient On (Oxygen Delivery Method): room air            PHYSICAL EXAMINATION:  General - well-nourished, no acute distress  Neuro - awake, alert, oriented x4, no acute focal deficits  HEENT - normocephalic, PERRL, moist mucous membranes  Lungs - clear to auscultation bilaterally, right chest wall tenderness  Heart - regular rate and rhythm, S1S2 / irregularly irregular  Abdomen - soft, nontender, nondistended  Extremities - all four extremities are warm & pink with 2+ pulses, strength 5/5, sensation intact    LABS:                          10.7   5.60  )-----------( 124      ( 11 Aug 2024 13:25 )             33.3       08-11    138  |  103  |  11  ----------------------------<  83  4.4   |  19<L>  |  0.98    Ca    9.5      11 Aug 2024 13:25    TPro  7.3  /  Alb  4.0  /  TBili  0.3  /  DBili  x   /  AST  25  /  ALT  40<H>  /  AlkPhos  63  08-11                  RECENT CULTURES:      CAPILLARY BLOOD GLUCOSE      POCT Blood Glucose.: 97 mg/dL (11 Aug 2024 12:23)      IMAGING STUDIES: HISTORY OF PRESENT ILLNESS:  Ms. 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 Zheng) presenting with LLQ abdominal pain and increased urinary frequency. Patient reports recovering well from surgery, has been tolerating PO without issue and having normal bowel function. She was discharged POD1 and her Eliquis was restarted POD0 and she has been taking it up until the night of presentation. She reports sudden onset of LLQ abdominal pain this morning that radiates to her left flank. She denies fevers, chills, nausea, vomiting.    Upon chart review patient presented to the emergency department at [facility] with dizziness, lightheadedness, and reported hypotension. CT angio of the chest was done at that time with no acute findings.     In the [facility] ED, patient is afebrile and hemodynamically normal. Labs are unremarkable. CTAP demonstrating concern for infarction of the periumbilical omentum and fat stranding in the umbilical subcutaneous tissue.     PAST MEDICAL HISTORY: Asthma    Seizure    Factor V Leiden    Bipolar disorder    Endometriosis    History of DVT in adulthood    Seizures    Hypothyroid    Seasonal allergies    Insomnia    GERD (gastroesophageal reflux disease)    HTN (hypertension)    Diabetes    DVT (deep venous thrombosis)    Factor V Leiden        PAST SURGICAL HISTORY: No significant past surgical history    No significant past surgical history        HOME MEDICATIONS:    ALLERGIES: Zofran (Unknown)  latex (Blisters)  Toradol (Unknown)  TraZODone Hydrochloride (Unknown)  penicillin (Hives)  Toradol (Hives)  gabapentin (Unknown)  Zofran (Hives)  TraMADol Hydrochloride (Unknown)  ibuprofen (Pruritus)  [This allergen will not trigger allergy alert] Toradol SOLN (Unknown)  penicillin (Other)  Zofran (Rash)  trazodone (Other)  [This allergen will not trigger allergy alert] pineapple allergenic extract (Swelling)      FAMILY HISTORY: No pertinent family history in first degree relatives    Family history of DVT (Mother)        SOCIAL HISTORY:    REVIEW OF SYSTEMS:    VITAL SIGNS:  ICU Vital Signs Last 24 Hrs  T(C): 36.7 (11 Aug 2024 20:45), Max: 36.8 (11 Aug 2024 12:24)  T(F): 98.1 (11 Aug 2024 20:45), Max: 98.2 (11 Aug 2024 12:24)  HR: 85 (11 Aug 2024 20:45) (70 - 90)  BP: 118/86 (11 Aug 2024 20:45) (118/86 - 120/86)  BP(mean): --  ABP: --  ABP(mean): --  RR: 16 (11 Aug 2024 20:45) (16 - 18)  SpO2: 100% (11 Aug 2024 20:45) (98% - 100%)    O2 Parameters below as of 11 Aug 2024 20:45  Patient On (Oxygen Delivery Method): room air            PHYSICAL EXAMINATION:  General - no acute distress  Lungs - breathing comfortably on room air  Heart - pulse regular  Abdomen - soft, periincisional tenderness to palpation, all incisions intact, clean, and dry without erythema; tender to palpation in LLQ and flank  Extremities - all four extremities are warm     LABS:                          10.7   5.60  )-----------( 124      ( 11 Aug 2024 13:25 )             33.3       08-11    138  |  103  |  11  ----------------------------<  83  4.4   |  19<L>  |  0.98    Ca    9.5      11 Aug 2024 13:25    TPro  7.3  /  Alb  4.0  /  TBili  0.3  /  DBili  x   /  AST  25  /  ALT  40<H>  /  AlkPhos  63  08-11                  RECENT CULTURES:      CAPILLARY BLOOD GLUCOSE      POCT Blood Glucose.: 97 mg/dL (11 Aug 2024 12:23)      IMAGING STUDIES:

## 2024-08-11 NOTE — PROVIDER CONTACT NOTE (OTHER) - SITUATION
Chart Reviewed. Writer outreached by provider requesting transportation back to group home as pt is medically and socially cleared.

## 2024-08-11 NOTE — ED ADULT TRIAGE NOTE - CHIEF COMPLAINT QUOTE
Pt arrives from [facility] for abdominal pain. Pt states she had an appendectomy on monday however this morning she started experiencing LUQ abdominal pain that radiates down to the RLQ and LLQ. Pt endorsing increased urinary frequency. Endorsing dizziness. breathing even and unlabored. Hx of seizures, HTN, pre-diabetes.

## 2024-08-11 NOTE — ED PROVIDER NOTE - PATIENT PORTAL LINK FT
You can access the FollowMyHealth Patient Portal offered by [facility] by registering at the following website: [email or web address]. By joining [employer]’s FollowMyHealth portal, you will also be able to view your health information using other applications (apps) compatible with our system.

## 2024-08-11 NOTE — PROVIDER CONTACT NOTE (OTHER) - ASSESSMENT
Writer contacted pt's group home and spoke with staff member, Omer Husain, who was informed of pt's discharge. writer confirmed pt to travel via [facility] with [employer] of ems. Group home address confirmed. Writer outreached [facility]: [phone] and scheduled the following S trip with rep: Sandra. As per rep pt with [place] [employer]: [phone] and must have auth obtained from vendor (dept closed) . Writer scheduled trip as billback pending auth.

## 2024-08-12 ENCOUNTER — EMERGENCY (EMERGENCY)
Facility: HOSPITAL | Age: 39
LOS: 1 days | Discharge: HOME | End: 2024-08-12
Attending: STUDENT IN AN ORGANIZED HEALTH CARE EDUCATION/TRAINING PROGRAM | Admitting: STUDENT IN AN ORGANIZED HEALTH CARE EDUCATION/TRAINING PROGRAM
Payer: MEDICARE

## 2024-08-12 VITALS
HEIGHT: 68 IN | TEMPERATURE: 97 F | OXYGEN SATURATION: 100 % | SYSTOLIC BLOOD PRESSURE: 114 MMHG | WEIGHT: 293 LBS | HEART RATE: 92 BPM | DIASTOLIC BLOOD PRESSURE: 80 MMHG | RESPIRATION RATE: 20 BRPM

## 2024-08-12 VITALS
SYSTOLIC BLOOD PRESSURE: 120 MMHG | OXYGEN SATURATION: 100 % | DIASTOLIC BLOOD PRESSURE: 75 MMHG | TEMPERATURE: 98 F | HEART RATE: 93 BPM | RESPIRATION RATE: 20 BRPM

## 2024-08-12 PROCEDURE — 99284 EMERGENCY DEPT VISIT MOD MDM: CPT | Mod: 25

## 2024-08-12 PROCEDURE — 36410 VNPNXR 3YR/> PHY/QHP DX/THER: CPT

## 2024-08-12 RX ORDER — OXYCODONE HYDROCHLORIDE 30 MG/1
5 TABLET ORAL ONCE
Refills: 0 | Status: DISCONTINUED | OUTPATIENT
Start: 2024-08-12 | End: 2024-08-12

## 2024-08-12 RX ORDER — LORATADINE 10 MG
17 TABLET,DISINTEGRATING ORAL ONCE
Refills: 0 | Status: COMPLETED | OUTPATIENT
Start: 2024-08-12 | End: 2024-08-12

## 2024-08-12 RX ORDER — ACETAMINOPHEN 500 MG
975 TABLET ORAL ONCE
Refills: 0 | Status: COMPLETED | OUTPATIENT
Start: 2024-08-12 | End: 2024-08-12

## 2024-08-12 RX ORDER — LORATADINE 10 MG
17 TABLET,DISINTEGRATING ORAL
Qty: 1 | Refills: 0
Start: 2024-08-12 | End: 2024-08-18

## 2024-08-12 NOTE — ED PROVIDER NOTE - NSFOLLOWUPINSTRUCTIONS_ED_ALL_ED_FT
Acute Abdominal Pain    WHAT YOU NEED TO KNOW:  The cause of your abdominal pain may not be found. If a cause is found, treatment will depend on what the cause is.    DISCHARGE INSTRUCTIONS:    Seek care immediately if:  You vomit blood or cannot stop vomiting.  You have blood in your bowel movement or it looks like tar.  You have bleeding from your rectum.  Your abdomen is larger than usual, more painful, and hard.  You have severe pain in your abdomen.  You stop passing gas and having bowel movements.  You feel weak, dizzy, or faint.  Contact your healthcare provider if:  You have a fever.  You have new signs and symptoms.  Your symptoms do not get better with treatment.  You have questions or concerns about your condition or care.  Medicines may be given to decrease pain, treat an infection, and manage your symptoms. Take your medicine as directed. Call your healthcare provider if you think your medicine is not helping or if you have side effects. Tell him if you are allergic to any medicine. Keep a list of the medicines, vitamins, and herbs you take. Include the amounts, and when and why you take them. Bring the list or the pill bottles to follow-up visits. Carry your medicine list with you in case of an emergency.    Manage your symptoms:    Apply heat on your abdomen for 20 to 30 minutes every 2 hours for as many days as directed. Heat helps decrease pain and muscle spasms.  Manage your stress. Stress may cause abdominal pain. Your healthcare provider may recommend relaxation techniques and deep breathing exercises to help decrease your stress. Your healthcare provider may recommend you talk to someone about your stress or anxiety, such as a counselor or a trusted friend. Get plenty of sleep and exercise regularly.  Limit or do not drink alcohol. Alcohol can make your abdominal pain worse. Ask your healthcare provider if it is safe for you to drink alcohol. Also ask how much is safe for you to drink.  Do not smoke. Nicotine and other chemicals in cigarettes can damage your esophagus and stomach. Ask your healthcare provider for information if you currently smoke and need help to quit. E-cigarettes or smokeless tobacco still contain nicotine. Talk to your healthcare provider before you use these products.  Make changes to the food you eat as directed: Do not eat foods that cause abdominal pain or other symptoms. Eat small meals more often.  Eat more high-fiber foods if you are constipated. High-fiber foods include fruits, vegetables, whole-grain foods, and legumes.  Do not eat foods that cause gas if you have bloating. Examples include broccoli, cabbage, and cauliflower. Do not drink soda or carbonated drinks, because these may also cause gas.  Do not eat foods or drinks that contain sorbitol or fructose if you have diarrhea and bloating. Some examples are fruit juices, candy, jelly, and sugar-free gum.  Do not eat high-fat foods, such as fried foods, cheeseburgers, hot dogs, and desserts.  Limit or do not drink caffeine. Caffeine may make symptoms, such as heart burn or nausea, worse.  Drink plenty of liquids to prevent dehydration from diarrhea or vomiting. Ask your healthcare provider how much liquid to drink each day and which liquids are best for you.  Follow up with your healthcare provider as directed: Write down your questions so you remember to ask them during your visits. You were seen in the emergency department for postoperative abdominal pain.  You had a CT scan yesterday that showed that you had inflammation around the surgical site, which is normal.  You were given a prescription for Percocet; it is possible that this may not be delivered in the mail due to it being a controlled substance but you should discuss this with your group home about how to access his medication.    While on this medication you should also be started on a bowel regimen which should include MiraLAX.  For the next week you may take this twice per day but you may also titrate this to 2 bowel movements per    You should return to the emergency department if you develop any new or worsening symptoms, including nausea/vomiting, fever/chills, not having bowel movements, or any other new or concerning symptoms that you would like to evaluated.    Acute Abdominal Pain    WHAT YOU NEED TO KNOW:  The cause of your abdominal pain may not be found. If a cause is found, treatment will depend on what the cause is.    DISCHARGE INSTRUCTIONS:    Seek care immediately if:  You vomit blood or cannot stop vomiting.  You have blood in your bowel movement or it looks like tar.  You have bleeding from your rectum.  Your abdomen is larger than usual, more painful, and hard.  You have severe pain in your abdomen.  You stop passing gas and having bowel movements.  You feel weak, dizzy, or faint.  Contact your healthcare provider if:  You have a fever.  You have new signs and symptoms.  Your symptoms do not get better with treatment.  You have questions or concerns about your condition or care.  Medicines may be given to decrease pain, treat an infection, and manage your symptoms. Take your medicine as directed. Call your healthcare provider if you think your medicine is not helping or if you have side effects. Tell him if you are allergic to any medicine. Keep a list of the medicines, vitamins, and herbs you take. Include the amounts, and when and why you take them. Bring the list or the pill bottles to follow-up visits. Carry your medicine list with you in case of an emergency.    Manage your symptoms:    Apply heat on your abdomen for 20 to 30 minutes every 2 hours for as many days as directed. Heat helps decrease pain and muscle spasms.  Manage your stress. Stress may cause abdominal pain. Your healthcare provider may recommend relaxation techniques and deep breathing exercises to help decrease your stress. Your healthcare provider may recommend you talk to someone about your stress or anxiety, such as a counselor or a trusted friend. Get plenty of sleep and exercise regularly.  Limit or do not drink alcohol. Alcohol can make your abdominal pain worse. Ask your healthcare provider if it is safe for you to drink alcohol. Also ask how much is safe for you to drink.  Do not smoke. Nicotine and other chemicals in cigarettes can damage your esophagus and stomach. Ask your healthcare provider for information if you currently smoke and need help to quit. E-cigarettes or smokeless tobacco still contain nicotine. Talk to your healthcare provider before you use these products.  Make changes to the food you eat as directed: Do not eat foods that cause abdominal pain or other symptoms. Eat small meals more often.  Eat more high-fiber foods if you are constipated. High-fiber foods include fruits, vegetables, whole-grain foods, and legumes.  Do not eat foods that cause gas if you have bloating. Examples include broccoli, cabbage, and cauliflower. Do not drink soda or carbonated drinks, because these may also cause gas.  Do not eat foods or drinks that contain sorbitol or fructose if you have diarrhea and bloating. Some examples are fruit juices, candy, jelly, and sugar-free gum.  Do not eat high-fat foods, such as fried foods, cheeseburgers, hot dogs, and desserts.  Limit or do not drink caffeine. Caffeine may make symptoms, such as heart burn or nausea, worse.  Drink plenty of liquids to prevent dehydration from diarrhea or vomiting. Ask your healthcare provider how much liquid to drink each day and which liquids are best for you.  Follow up with your healthcare provider as directed: Write down your questions so you remember to ask them during your visits.

## 2024-08-12 NOTE — ED PROVIDER NOTE - ATTENDING CONTRIBUTION TO CARE
I have discussed the patient's case presentation with resident. I have also personally performed a face-to-face evaluation of the patient. I agree with the resident's assessment and plan. I have discussed the patient's case presentation with resident. I have also personally performed a face-to-face evaluation of the patient. I agree with the resident's assessment and plan.    39F with complex social history, mild intellectual disability, recent appendectomy presents with abdominal pain.  She was evaluated yesterday with CT abdomen pelvis that showed omental thickening for which she was rx'd pain control.  She says that the oxycodone did not arrive to [facility]  She denies any fever/chills, plus nausea, no vomiting, still passing gas.  Last bowel movement on Friday.  Patient states she does not have bowel movements regularly.    VS unremarkable  General: WN/WD NAD large body habitus  Head: Atraumatic  Eyes: EOM grossly in tact, no scleral icterus  ENT: moist mucous membranes  Neurology: A&Ox3, nonfocal, RHODES x 4  Respiratory: normal respiratory effort  CV: Extremities warm and well perfused  Abdominal: Soft, non-distended  Extremities: No edema  Skin: No rashes    [facility]  39F with history as above presents with primarily epigastric and left lower quadrant abdominal pain stable from yesterday.  CT abdomen pelvis yesterday showing omental thickening, no evidence of cellulitis.  Suspect this is the same problem was yesterday, no interval Hx to suggest  deterioration. Pt requesting pain ctrl. Acetaminophen, oxycodone 5 mg, to be DC to group home. Complex care note appreciated. - Blanka Perez MD. EM Attending

## 2024-08-12 NOTE — ED PROVIDER NOTE - NSTIMEPROVIDERCAREINITIATE_GEN_ER
12-Aug-2024 12:25
Nutrition physical assessment not warranted - no overt visual signs of muscle depletion or subcutaneous fat loss

## 2024-08-12 NOTE — ED ADULT NURSE NOTE - OBJECTIVE STATEMENT
, Patient states that she has pain in the left side that radiates to her left rib and her left lower abdomen. Patient also reports nausea but denies vomiting or diarrhea. Patient AAOx3. No distress noted. Patient  calm and cooperative. Chest expansion symmetric. Abdomen obese, soft and tender in the LLQ. No edema noted to the lower extremities. Skins warm, dry, and intact. Patient awaiting MD pitts.

## 2024-08-12 NOTE — ED ADULT TRIAGE NOTE - CHIEF COMPLAINT QUOTE
Patient complains of abdominal pain radiating to lower back, patient recently discharged from ED but have not received percocet in mail. Patient denies any psych complaints at this time. Admits to feeling nauseous, denies any vomiting or diarrhea. Patient denies any CP or SOB, respirations equal and unlabored on room air. pmhx: asthma, HLD, schizophrenia, depression, seizure disorder

## 2024-08-12 NOTE — ED PROVIDER NOTE - PROGRESS NOTE DETAILS
Dayron Brown MD PGY-4  Pt declining APAP & Oxy separately. Only wants Percocet... Attemtped to explain dosing & medications but pt declines. Given history of escalation & agitated behavior, will reduce dose of APAP & given percot. Dayron Brown MD PGY-4  Pt declining APAP & Oxy separately. Only wants Percocet... Attempted to explain dosing & medications but pt declines. Given history of escalation & agitated behavior, will reduce dose of APAP & give percocet.

## 2024-08-12 NOTE — ED ADULT NURSE NOTE - NSFALLUNIVINTERV_ED_ALL_ED
Bed/Stretcher in lowest position, wheels locked, appropriate side rails in place/Call bell, personal items and telephone in reach/Instruct patient to call for assistance before getting out of bed/chair/stretcher/Non-slip footwear applied when patient is off stretcher/[place] to call system/Physically safe environment - no spills, clutter or unnecessary equipment/Purposeful proactive rounding/Room/bathroom lighting operational, light cord in reach

## 2024-08-12 NOTE — ED PROVIDER NOTE - PATIENT PORTAL LINK FT
You can access the FollowMyHealth Patient Portal offered by [facility] by registering at the following website: [email or web address]. By joining [employer]’s FollowMyHealth portal, you will also be able to view your health information using other applications (apps) compatible with our system.

## 2024-08-12 NOTE — ED ADULT NURSE REASSESSMENT NOTE - NS ED NURSE REASSESS COMMENT FT1
Pr resting in room 7. [facility] at bedside with pt. [facility] canceled transport due to improper transport equipment. Pt is calm and cooperative at this time. Airway is patent, respirations are even and unlabored. VS as noted in the flow sheet. Plan of care ongoing, safety maintained.
shortness of breath/fever

## 2024-08-12 NOTE — ED ADULT TRIAGE NOTE - BSA (M2)
Spoke to [employer] and yes the received our form and they will contact patient to discuss coverage.    2.46

## 2024-08-12 NOTE — ED PROVIDER NOTE - CLINICAL SUMMARY MEDICAL DECISION MAKING FREE TEXT BOX
39F with complex social history, mild intellectual disability, recent appendectomy presents with abdominal pain.  She was evaluated yesterday with CT abdomen pelvis that showed omental thickening for which she was rx'd pain control.  She says that the oxycodone did not arrive to [facility]  She denies any fever/chills, plus nausea, no vomiting, still passing gas.  Last bowel movement on Friday.  Patient states she does not have bowel movements regularly.    MDM  39F with history as above presents with primarily epigastric and left lower quadrant abdominal pain stable from yesterday.  CT abdomen pelvis showing omental thickening, no evidence of cellulitis.  Suspect this is the same problem was yesterday.  -Acetaminophen, oxycodone 5 mg, to be DC to group home

## 2024-08-13 ENCOUNTER — EMERGENCY (EMERGENCY)
Facility: HOSPITAL | Age: 39
LOS: 1 days | Discharge: HOME | End: 2024-08-13
Attending: EMERGENCY MEDICINE | Admitting: EMERGENCY MEDICINE
Payer: MEDICARE

## 2024-08-13 ENCOUNTER — NON-APPOINTMENT (OUTPATIENT)
Age: 39
End: 2024-08-13

## 2024-08-13 VITALS
TEMPERATURE: 98 F | RESPIRATION RATE: 18 BRPM | SYSTOLIC BLOOD PRESSURE: 107 MMHG | HEART RATE: 101 BPM | HEIGHT: 68 IN | DIASTOLIC BLOOD PRESSURE: 77 MMHG | WEIGHT: 293 LBS | OXYGEN SATURATION: 97 %

## 2024-08-13 VITALS
SYSTOLIC BLOOD PRESSURE: 108 MMHG | TEMPERATURE: 97 F | OXYGEN SATURATION: 100 % | RESPIRATION RATE: 16 BRPM | HEART RATE: 93 BPM | DIASTOLIC BLOOD PRESSURE: 73 MMHG

## 2024-08-13 PROCEDURE — 99283 EMERGENCY DEPT VISIT LOW MDM: CPT

## 2024-08-13 RX ORDER — SENNOSIDES 8.6 MG/1
1 TABLET ORAL ONCE
Refills: 0 | Status: COMPLETED | OUTPATIENT
Start: 2024-08-13 | End: 2024-08-13

## 2024-08-13 RX ORDER — LORATADINE 10 MG
17 TABLET,DISINTEGRATING ORAL ONCE
Refills: 0 | Status: COMPLETED | OUTPATIENT
Start: 2024-08-13 | End: 2024-08-13

## 2024-08-13 RX ORDER — LIDOCAINE 5% 5 G/100G
1 CREAM TOPICAL ONCE
Refills: 0 | Status: COMPLETED | OUTPATIENT
Start: 2024-08-13 | End: 2024-08-13

## 2024-08-13 RX ADMIN — Medication 17 GRAM(S): at 14:26

## 2024-08-13 RX ADMIN — SENNOSIDES 1 TABLET(S): 8.6 TABLET ORAL at 14:26

## 2024-08-13 NOTE — ED ADULT NURSE NOTE - NS ED NOTE ABUSE SUSPICION NEGLECT YN
Pt took a \"hit\" of a cigarette and immediately coughed and experienced right sided chest pain and chest wall spasm. Pt states pain is better at this time. Pt states he also took a \"pinch\" of heroin PTA. No

## 2024-08-13 NOTE — ED PROVIDER NOTE - CLINICAL SUMMARY MEDICAL DECISION MAKING FREE TEXT BOX
39F with complex social history, mild intellectual disability, recent appendectomy presents with abdominal pain and generalized tingling.  She has had 4 ED visits in the past week. CT abdomen pelvis from two days ago showed omental thickening for which she was rx'd pain control.  She denies any fever/chills, plus nausea, no vomiting.  Last bowel movement on Saturday.     Exam: see above    MDM  39F with history as above presents with primarily left lower quadrant abdominal pain and suprapubic pain stable from two days prior. Likely form post-surgical changes    Plan:  -oxycdone

## 2024-08-13 NOTE — ED ADULT NURSE NOTE - OBJECTIVE STATEMENT
Pt is aaox4 and follows command. Pt c/o constipation at this time. No other complaints identified. No distress noted. Pt medicated as ordered. Plan of care ongoing.

## 2024-08-13 NOTE — CHART NOTE - NSCHARTNOTEFT_GEN_A_CORE
RAVINDRA alerted by MD that pt is cleared for DC back to Group Home- [facility] ([id number] [place], [place]). RAVINDRA contacted  (phone#[phone]) and spoke with Brenna who confirmed pt's return. Brenna advised that pt must travel via ambulance with supervision.     Pt with [employer] Medicare. Insurance authorization request sent to Central Admin team. SW requested ASAP bariatric BLS ambulance pickup. SW to follow-up on request. Non-emergent form in paper chart.     No other SW needs identified at this time.
Prescription Information      PDI Filter:    PDI	Current Rx	Drug Type	Rx Written	Rx Dispensed	Drug	Quantity	Days Supply	Prescriber Name	Prescriber SHERRI #	Payment Method	Dispenser  A	Y	O	08/11/2024	08/12/2024	oxycodone-acetaminophen 5-325 mg tab	4	1	Kevin Samaniego)	[id number]	[facility] Rx  A	N	O	08/06/2024	08/06/2024	oxycodone hcl (ir) 5 mg tablet	5	2	Breanna Brooks	[id number]	[facility] Rx  B	N	O	11/10/2023	11/11/2023	oxycodone-acetaminophen 5-325 mg tab	6	3	Nahun Mc MD	[id number]	Medicare	[facility]  B	N	O	10/13/2023	10/13/2023	oxycodone-acetaminophen 5-325 mg tab	8	2	Sacks, Harry G DDS	[id number]	Medicare	[facility]

## 2024-08-13 NOTE — ED PROVIDER NOTE - PATIENT PORTAL LINK FT
You can access the FollowMyHealth Patient Portal offered by [facility] by registering at the following website: [email or web address]. By joining [facility]’s FollowMyHealth portal, you will also be able to view your health information using other applications (apps) compatible with our system.

## 2024-08-13 NOTE — ED ADULT TRIAGE NOTE - CHIEF COMPLAINT QUOTE
Patient brought to ER by EMS from home for c/o Auras since 8a.m.. patient has history of seizures but no actual seizure today. Was here recently for same. Patient also is here for c/o headache as well.

## 2024-08-13 NOTE — ED PROVIDER NOTE - PHYSICAL EXAMINATION
LOS:     VITALS:   T(C): 36.6 (08-13-24 @ 13:14), Max: 36.6 (08-13-24 @ 13:14)  HR: 88 (08-13-24 @ 13:14) (88 - 101)  BP: 118/71 (08-13-24 @ 13:14) (107/77 - 120/75)  RR: 18 (08-13-24 @ 13:14) (18 - 20)  SpO2: 100% (08-13-24 @ 13:14) (97% - 100%)    GENERAL: NAD, lying in bed comfortably  HEAD:  Atraumatic, Normocephalic  EYES: EOMI, PERRLA, conjunctiva and sclera clear  ENT: Moist mucous membranes  CHEST/LUNG: Clear to auscultation bilaterally; No rales, rhonchi, wheezing, or rubs. Unlabored respirations  HEART: Regular rate and rhythm; No murmurs, rubs, or gallops  ABDOMEN: BSx4; Soft, tenderness on LLQ and suprapubic area. surgical scars clean, dry and intact  EXTREMITIES:  2+ Peripheral Pulses, brisk capillary refill. No clubbing, cyanosis, or edema  NERVOUS SYSTEM:  A&Ox3, no focal deficits   SKIN: No rashes or lesions

## 2024-08-13 NOTE — ED PROVIDER NOTE - PROGRESS NOTE DETAILS
Raysa Doss M.D. (Resident Physician): Discussed with facility and pt can get her percocet prn but she did not ask for it today. Pt informed of the situation. Will get SW to get her a ride back. Will dc.

## 2024-08-13 NOTE — ED PROVIDER NOTE - ATTENDING CONTRIBUTION TO CARE
Complex care note reviewed- pt p/w abdominal pain, had recent eval after appendectomy with rpt CT showing omental thickening, was given rx for opioid for pain control to be delivered to group home, pt states rx never arrived so presents for pain meds. Non toxic appearing, vss, called group home to confirm that they did receive the rx last ngiht but that pt had no requested meds. Advised pt to request meds from nurse at group home and that her rx had arrived, pt comfortable with dc

## 2024-08-14 RX ORDER — LORATADINE 10 MG
17 TABLET,DISINTEGRATING ORAL
Qty: 1 | Refills: 0
Start: 2024-08-14 | End: 2024-08-20

## 2024-08-14 NOTE — ED POST DISCHARGE NOTE - REASON FOR FOLLOW-UP
Fidel from [facility] called that patient needs rx for Miralxax. RX sent to Patient's preferred pharmacy. Other

## 2024-08-15 ENCOUNTER — EMERGENCY (EMERGENCY)
Facility: HOSPITAL | Age: 39
LOS: 1 days | Discharge: HOME | End: 2024-08-15
Attending: STUDENT IN AN ORGANIZED HEALTH CARE EDUCATION/TRAINING PROGRAM | Admitting: STUDENT IN AN ORGANIZED HEALTH CARE EDUCATION/TRAINING PROGRAM
Payer: MEDICARE

## 2024-08-15 VITALS
DIASTOLIC BLOOD PRESSURE: 71 MMHG | WEIGHT: 293 LBS | SYSTOLIC BLOOD PRESSURE: 103 MMHG | RESPIRATION RATE: 16 BRPM | TEMPERATURE: 98 F | HEART RATE: 100 BPM | HEIGHT: 68 IN | OXYGEN SATURATION: 100 %

## 2024-08-15 PROCEDURE — 99284 EMERGENCY DEPT VISIT MOD MDM: CPT

## 2024-08-15 RX ORDER — DIPHENHYDRAMINE HCL 12.5MG/5ML
25 ELIXIR ORAL ONCE
Refills: 0 | Status: DISCONTINUED | OUTPATIENT
Start: 2024-08-15 | End: 2024-08-15

## 2024-08-15 RX ORDER — SENNA 187 MG
1 TABLET ORAL ONCE
Refills: 0 | Status: COMPLETED | OUTPATIENT
Start: 2024-08-15 | End: 2024-08-15

## 2024-08-15 RX ORDER — METHOCARBAMOL 500 MG/1
750 TABLET, FILM COATED ORAL ONCE
Refills: 0 | Status: COMPLETED | OUTPATIENT
Start: 2024-08-15 | End: 2024-08-15

## 2024-08-15 RX ORDER — DIPHENHYDRAMINE HCL 12.5MG/5ML
25 ELIXIR ORAL ONCE
Refills: 0 | Status: COMPLETED | OUTPATIENT
Start: 2024-08-15 | End: 2024-08-15

## 2024-08-15 RX ADMIN — Medication 1 TABLET(S): at 11:22

## 2024-08-15 RX ADMIN — METHOCARBAMOL 750 MILLIGRAM(S): 500 TABLET, FILM COATED ORAL at 11:21

## 2024-08-15 RX ADMIN — Medication 25 MILLIGRAM(S): at 11:47

## 2024-08-16 RX ORDER — SENNA 187 MG
1 TABLET ORAL
Qty: 7 | Refills: 0
Start: 2024-08-16 | End: 2024-08-22

## 2024-08-17 ENCOUNTER — EMERGENCY (EMERGENCY)
Facility: HOSPITAL | Age: 39
LOS: 1 days | Discharge: HOME | End: 2024-08-17
Attending: EMERGENCY MEDICINE | Admitting: EMERGENCY MEDICINE
Payer: MEDICARE

## 2024-08-17 VITALS
WEIGHT: 293 LBS | TEMPERATURE: 98 F | SYSTOLIC BLOOD PRESSURE: 99 MMHG | RESPIRATION RATE: 18 BRPM | OXYGEN SATURATION: 98 % | HEIGHT: 68 IN | HEART RATE: 97 BPM | DIASTOLIC BLOOD PRESSURE: 68 MMHG

## 2024-08-17 PROCEDURE — 99285 EMERGENCY DEPT VISIT HI MDM: CPT

## 2024-08-17 RX ORDER — METOCLOPRAMIDE HCL 5 MG/ML
10 VIAL (ML) INJECTION ONCE
Refills: 0 | Status: COMPLETED | OUTPATIENT
Start: 2024-08-17 | End: 2024-08-17

## 2024-08-17 RX ORDER — FAMOTIDINE 40 MG/1
20 TABLET, FILM COATED ORAL ONCE
Refills: 0 | Status: COMPLETED | OUTPATIENT
Start: 2024-08-17 | End: 2024-08-17

## 2024-08-17 RX ORDER — BACTERIOSTATIC SODIUM CHLORIDE 0.9 %
1000 VIAL (ML) INJECTION ONCE
Refills: 0 | Status: COMPLETED | OUTPATIENT
Start: 2024-08-17 | End: 2024-08-17

## 2024-08-17 RX ORDER — MAGNESIUM, ALUMINUM HYDROXIDE 200-225/5
30 SUSPENSION, ORAL (FINAL DOSE FORM) ORAL ONCE
Refills: 0 | Status: COMPLETED | OUTPATIENT
Start: 2024-08-17 | End: 2024-08-17

## 2024-08-17 NOTE — ED ADULT NURSE NOTE - OBJECTIVE STATEMENT
Break RN: Pt is a 38 y/o Female, A&Ox4, ambulatory with a PHx of DMT2, HTN, seizures, Factor V deficiency and recent appendectomy on 8/4/24. Pt presents to the ED with c/o generalized abdominal pain starting this evening. Pt grimacing and appears uncomfortable with palpation of abdomen. Respirations even and unlabored, chest rise equal b/l. Pt denies chest pain, SOB, fever, cough, chills, N/V/D, constipation, h/a, dizziness, numbness/tingling or any urinary symptoms at this time. No acute distress noted. Dr. Joseph and Dr. Hand at bedside for eval. Pt pending MD orders. Safety maintained throughout.

## 2024-08-17 NOTE — ED ADULT TRIAGE NOTE - CHIEF COMPLAINT QUOTE
c/o diffused abd pain onset this evening after eating brownies, arrives from group home. phx of DM type 2, HTN, seizure, Factor V deficiency.

## 2024-08-18 VITALS
TEMPERATURE: 98 F | DIASTOLIC BLOOD PRESSURE: 60 MMHG | SYSTOLIC BLOOD PRESSURE: 99 MMHG | HEART RATE: 96 BPM | OXYGEN SATURATION: 99 % | RESPIRATION RATE: 16 BRPM

## 2024-08-18 LAB
ALBUMIN SERPL ELPH-MCNC: 4.1 G/DL — SIGNIFICANT CHANGE UP (ref 3.3–5)
ALP SERPL-CCNC: 79 U/L — SIGNIFICANT CHANGE UP (ref 40–120)
ALT FLD-CCNC: 49 U/L — HIGH (ref 4–33)
ANION GAP SERPL CALC-SCNC: 15 MMOL/L — HIGH (ref 7–14)
AST SERPL-CCNC: 27 U/L — SIGNIFICANT CHANGE UP (ref 4–32)
BASOPHILS # BLD AUTO: 0.03 K/UL — SIGNIFICANT CHANGE UP (ref 0–0.2)
BASOPHILS NFR BLD AUTO: 0.5 % — SIGNIFICANT CHANGE UP (ref 0–2)
BILIRUB SERPL-MCNC: <0.2 MG/DL — SIGNIFICANT CHANGE UP (ref 0.2–1.2)
BLOOD GAS VENOUS COMPREHENSIVE RESULT: SIGNIFICANT CHANGE UP
BUN SERPL-MCNC: 12 MG/DL — SIGNIFICANT CHANGE UP (ref 7–23)
CALCIUM SERPL-MCNC: 9.7 MG/DL — SIGNIFICANT CHANGE UP (ref 8.4–10.5)
CHLORIDE SERPL-SCNC: 101 MMOL/L — SIGNIFICANT CHANGE UP (ref 98–107)
CO2 SERPL-SCNC: 24 MMOL/L — SIGNIFICANT CHANGE UP (ref 22–31)
CREAT SERPL-MCNC: 0.79 MG/DL — SIGNIFICANT CHANGE UP (ref 0.5–1.3)
EGFR: 99 ML/MIN/1.73M2 — SIGNIFICANT CHANGE UP
EOSINOPHIL # BLD AUTO: 0.03 K/UL — SIGNIFICANT CHANGE UP (ref 0–0.5)
EOSINOPHIL NFR BLD AUTO: 0.5 % — SIGNIFICANT CHANGE UP (ref 0–6)
GLUCOSE SERPL-MCNC: 103 MG/DL — HIGH (ref 70–99)
HCG SERPL-ACNC: <1 MIU/ML — SIGNIFICANT CHANGE UP
HCT VFR BLD CALC: 35 % — SIGNIFICANT CHANGE UP (ref 34.5–45)
HGB BLD-MCNC: 11.2 G/DL — LOW (ref 11.5–15.5)
IANC: 2.32 K/UL — SIGNIFICANT CHANGE UP (ref 1.8–7.4)
IMM GRANULOCYTES NFR BLD AUTO: 1.4 % — HIGH (ref 0–0.9)
LIDOCAIN IGE QN: 38 U/L — SIGNIFICANT CHANGE UP (ref 7–60)
LYMPHOCYTES # BLD AUTO: 2.76 K/UL — SIGNIFICANT CHANGE UP (ref 1–3.3)
LYMPHOCYTES # BLD AUTO: 47.3 % — HIGH (ref 13–44)
MCHC RBC-ENTMCNC: 31 PG — SIGNIFICANT CHANGE UP (ref 27–34)
MCHC RBC-ENTMCNC: 32 GM/DL — SIGNIFICANT CHANGE UP (ref 32–36)
MCV RBC AUTO: 97 FL — SIGNIFICANT CHANGE UP (ref 80–100)
MONOCYTES # BLD AUTO: 0.61 K/UL — SIGNIFICANT CHANGE UP (ref 0–0.9)
MONOCYTES NFR BLD AUTO: 10.5 % — SIGNIFICANT CHANGE UP (ref 2–14)
NEUTROPHILS # BLD AUTO: 2.32 K/UL — SIGNIFICANT CHANGE UP (ref 1.8–7.4)
NEUTROPHILS NFR BLD AUTO: 39.8 % — LOW (ref 43–77)
NRBC # BLD: 0 /100 WBCS — SIGNIFICANT CHANGE UP (ref 0–0)
NRBC # FLD: 0 K/UL — SIGNIFICANT CHANGE UP (ref 0–0)
PLATELET # BLD AUTO: 147 K/UL — LOW (ref 150–400)
POTASSIUM SERPL-MCNC: 4.4 MMOL/L — SIGNIFICANT CHANGE UP (ref 3.5–5.3)
POTASSIUM SERPL-SCNC: 4.4 MMOL/L — SIGNIFICANT CHANGE UP (ref 3.5–5.3)
PROT SERPL-MCNC: 7.7 G/DL — SIGNIFICANT CHANGE UP (ref 6–8.3)
RBC # BLD: 3.61 M/UL — LOW (ref 3.8–5.2)
RBC # FLD: 15.8 % — HIGH (ref 10.3–14.5)
SODIUM SERPL-SCNC: 140 MMOL/L — SIGNIFICANT CHANGE UP (ref 135–145)
WBC # BLD: 5.83 K/UL — SIGNIFICANT CHANGE UP (ref 3.8–10.5)
WBC # FLD AUTO: 5.83 K/UL — SIGNIFICANT CHANGE UP (ref 3.8–10.5)

## 2024-08-18 PROCEDURE — 74177 CT ABD & PELVIS W/CONTRAST: CPT | Mod: 26,MC

## 2024-08-18 RX ORDER — METOCLOPRAMIDE HCL 5 MG/ML
5 VIAL (ML) INJECTION ONCE
Refills: 0 | Status: COMPLETED | OUTPATIENT
Start: 2024-08-18 | End: 2024-08-18

## 2024-08-18 RX ORDER — DIPHENHYDRAMINE HCL 25 MG
25 CAPSULE ORAL ONCE
Refills: 0 | Status: COMPLETED | OUTPATIENT
Start: 2024-08-18 | End: 2024-08-18

## 2024-08-18 RX ORDER — DIPHENHYDRAMINE HCL 25 MG
50 CAPSULE ORAL ONCE
Refills: 0 | Status: COMPLETED | OUTPATIENT
Start: 2024-08-18 | End: 2024-08-18

## 2024-08-18 RX ADMIN — Medication 5 MILLIGRAM(S): at 08:56

## 2024-08-18 RX ADMIN — Medication 10 MILLIGRAM(S): at 00:36

## 2024-08-18 RX ADMIN — Medication 25 MILLIGRAM(S): at 08:56

## 2024-08-18 RX ADMIN — Medication 1000 MILLILITER(S): at 00:44

## 2024-08-18 RX ADMIN — Medication 30 MILLILITER(S): at 00:36

## 2024-08-18 RX ADMIN — Medication 50 MILLIGRAM(S): at 00:44

## 2024-08-18 RX ADMIN — FAMOTIDINE 20 MILLIGRAM(S): 40 TABLET, FILM COATED ORAL at 00:36

## 2024-08-18 NOTE — ED PROVIDER NOTE - NSFOLLOWUPINSTRUCTIONS_ED_ALL_ED_FT
Please follow up with your doctor within 1 week. Bring copies of your results with you (provided in your discharge paperwork). Please stay well-hydrated.      PLEASE RETURN TO ED FOR NEW OR WORSENING SYMPTOMS (intractable nausea/vomiting, severe bleeding, fever over 100.4F, loss of movement of one or more limbs, seizure)    Finally, please follow up with your surgeon regarding the postoperative healing after your appendectomy.

## 2024-08-18 NOTE — PROVIDER CONTACT NOTE (OTHER) - ASSESSMENT
RAVINDRA called the group home, [facility] ([phone]) and spoke with Fredis who reported the patient should travel back via ambulance to [address] [place] [address] [address].    RAVINDRA received [facility] auth #[id number] for S transport with [facility] trip #099A. Trip processed as a bilback due to medicaid not being the primary insurance. ETA 60-90 minutes.

## 2024-08-18 NOTE — PROVIDER CONTACT NOTE (OTHER) - SITUATION
SW contacted by the medical team, the patient is cleared for discharge and needs transportation back home.

## 2024-08-18 NOTE — ED PROCEDURE NOTE - ATTENDING CONTRIBUTION TO CARE
DR. FLOWERS, ATTENDING MD-  I was in the exam room and observed and supervised the resident when they were completing the key portions of this procedure.

## 2024-08-18 NOTE — ED PROVIDER NOTE - CLINICAL SUMMARY MEDICAL DECISION MAKING FREE TEXT BOX
38 y/o F with complex social history, mild intellectual disability, DM2, schizophrenia, dvt/pe on Eliquis, recent appendectomy on 08/05/24 presents with abdominal pain and generalized tingling.  Per chart review she has had 5 ED visits since the surgery and last CT/AP was 1 week ago which showed Interval new mild fat stranding of the periumbilical omentum, nonspecific, may represent omental infarction, and mild overlying skin thickening of the umbilicus with mild interval new subcutaneous fat stranding. She notes she has not had a BM in the past 3 days.   VS non actionable, afebrile  On exam she has abd ttp worst in the epigastrium and suprapubic areas. 40 y/o F with complex social history, mild intellectual disability, DM2, schizophrenia, dvt/pe on Eliquis, recent appendectomy on 08/05/24 presents with abdominal pain and generalized tingling.  Per chart review she has had 5 ED visits since the surgery and last CT/AP was 1 week ago which showed Interval new mild fat stranding of the periumbilical omentum, nonspecific, may represent omental infarction, and mild overlying skin thickening of the umbilicus with mild interval new subcutaneous fat stranding. She notes she has not had a BM in the past 3 days.   VS non actionable, afebrile  On exam she has abd ttp worst in the epigastrium and suprapubic areas.  DDx- abscess vs other post surgical complication. Will pain control, obtain CT despite multiple visits for same complaint as patient has not had one in a week and has only had one since the surgery. If no actionable findings and pain controlled can DC.

## 2024-08-18 NOTE — ED PROVIDER NOTE - ATTENDING CONTRIBUTION TO CARE
38 y/o F with complex social history, mild intellectual disability, DM2, schizophrenia, dvt/pe on Eliquis, from group home recent appendectomy on 08/05/24 presents with abdominal pain and generalized tingling. Patient states that she has been having generalized abdominal pain nonradiating associated with mild nausea and vomiting nonbloody nonbilious but has been able to tolerate p.o. every once in a while.  Denies any diarrhea.  No fevers, chills, chest pain, shortness of breath, trauma, falls.  Denies any alcohol use or drugs.  No urinary symptoms.  LMP was 2 weeks ago.  Does not think she is pregnant.    Patient is obese, well-appearing otherwise.  Abdomen distended and tender in the epigastrium and suprapubic regions but negative McBurney's and negative 's    39-year-old female with complex social history, complex care note, mild intellectual disability, diabetes type 2, schizophrenia, previous DVT/PE on Eliquis from group home with a recent appendectomy 2 weeks ago that presenting with abdominal pain. Per chart review she has had 5 ED visits since the surgery and last CT/AP was 1 week ago which showed Interval new mild fat stranding of the periumbilical omentum, nonspecific, may represent omental infarction, and mild overlying skin thickening of the umbilicus with mild interval new subcutaneous fat stranding. She notes she has not had a BM in the past 3 days although is passing gas. The concern is continued symptoms of her last CT findings versus bowel obstruction versus abscess.  At this time will obtain labs and imaging provide pain medications and reassess.

## 2024-08-18 NOTE — ED PROCEDURE NOTE - NS ED PROCEDURE ASSISTED BY
I personally evaluated the patient. I reviewed the Resident’s or Physician Assistant’s note (as assigned above), and agree with the findings and plan except as documented in my note. 19-year-old female presents to the ED for evaluation of vaginal bleeding.  LMP was in April.  She states that she woke up today and had bleeding.  She is inconsistent with protection and sexually active with males.  She was recently seen in urgent care and is on antibiotics for UTI and valacyclovir for herpetic lesions.  She has history of prior STI that was treated.  In ED denies abdominal pain.  No fever.  Physical Exam: VS reviewed. Pt is well appearing, in no respiratory distress. MMM. Cap refill <2 seconds. Skin with no obvious rash noted.  Chest with no retractions, no distress. Neuro exam grossly intact.  Plan: Vaginal ultrasound, labs, urine studies, will reassess. The procedure was performed independently

## 2024-08-18 NOTE — ED PROVIDER NOTE - PROGRESS NOTE DETAILS
Christiano Hand DO (PGY-2) Patient reevaluated at bedside. Patient is doing well, pain well controlled at this time.CT non actionable-  Periumbilical and adjacent omental fat stranding similar to the prior   exam. No superficial or intraperitoneal collection. Will PO challenge and dc. Attending MD Vo.  Pt signed out to me in stable condition pending repeat vitals, DC with SW for medicaid taxi, 40 yo s/p appendectomy 2wks ago, here 7 times with abd'l pain, from group home with complex care note, CTAP omental infarct c/w pain, no acutely actionable findings, passed PO challenge. Attending MD Vo.  Pt signed out to me in stable condition pending repeat vitals, DC with SW for medicaid taxi, passed PO challenge.

## 2024-08-18 NOTE — ED ADULT NURSE REASSESSMENT NOTE - NS ED NURSE REASSESS COMMENT FT1
received pt. aao x 4, awaiting SW for discharge. c/o surgical site pain/itching at this time. medicated as per eMAR. REsting comfortably in stretcher. calm and cooperative. RR even and unlabored. Will cont to monitor.

## 2024-08-18 NOTE — ED PROCEDURE NOTE - PROCEDURE ADDITIONAL DETAILS
Above line placed under dynamic guidance using ultrasound.   Dark, nonpulsatile blood return noted, flushed afterwards without any resistance or resulting extravasation.   Flushed with three 10 mL flushes, each with good blood return through J-loop.

## 2024-08-18 NOTE — ED PROVIDER NOTE - PHYSICAL EXAMINATION
GENERAL: NAD, morbid  obesity  HEENT:  Atraumatic  CHEST/LUNG: Chest rise equal bilaterally cta b/l normal s1/s2  HEART: Regular rate and rhythm normal s1/s2  ABDOMEN: Diffusely ttp worst in epigastrium, suprapubic without rebound guarding or rigidity  EXTREMITIES:  Extremities warm  PSYCH: A&Ox3  SKIN: No obvious rashes or lesions. Well healing surgical incision sites without surrounding erythema

## 2024-08-18 NOTE — ED PROVIDER NOTE - PATIENT PORTAL LINK FT
You can access the FollowMyHealth Patient Portal offered by [facility] by registering at the following website: [email or web address]. By joining [facility]’s FollowMyHealth portal, you will also be able to view your health information using other applications (apps) compatible with our system.

## 2024-08-23 ENCOUNTER — NON-APPOINTMENT (OUTPATIENT)
Age: 39
End: 2024-08-23

## 2024-08-24 ENCOUNTER — NON-APPOINTMENT (OUTPATIENT)
Age: 39
End: 2024-08-24

## 2024-08-27 ENCOUNTER — APPOINTMENT (OUTPATIENT)
Dept: OTOLARYNGOLOGY | Facility: CLINIC | Age: 39
End: 2024-08-27

## 2024-08-28 ENCOUNTER — NON-APPOINTMENT (OUTPATIENT)
Age: 39
End: 2024-08-28

## 2024-08-30 ENCOUNTER — EMERGENCY (EMERGENCY)
Facility: HOSPITAL | Age: 39
LOS: 1 days | Discharge: HOME | End: 2024-08-30
Attending: EMERGENCY MEDICINE | Admitting: EMERGENCY MEDICINE
Payer: MEDICARE

## 2024-08-30 VITALS
DIASTOLIC BLOOD PRESSURE: 80 MMHG | HEIGHT: 68 IN | OXYGEN SATURATION: 97 % | HEART RATE: 106 BPM | RESPIRATION RATE: 16 BRPM | TEMPERATURE: 98 F | SYSTOLIC BLOOD PRESSURE: 111 MMHG

## 2024-08-30 PROCEDURE — 99284 EMERGENCY DEPT VISIT MOD MDM: CPT

## 2024-08-30 NOTE — ED ADULT TRIAGE NOTE - CHIEF COMPLAINT QUOTE
Pt from group home with neck and back pain x 2 weeks. dizziness since 4pm today. Swelling noted to R hand, mild swelling to R side of face. Pt denies injury/trauma. Phx schizophrenia, bipolar, seizures, last seizure 2 years ago. Denies auditory/visual hallucinations, denies S/I, H/I, denies drug/alcohol use.

## 2024-08-30 NOTE — ED ADULT TRIAGE NOTE - NS ED NURSE AMBULANCES
[facility] a/p: 28 y.o.  MYLENE 2021 @ 39.4 weeks no evidence of vaginal bleeding    1250 p Discussed with Dr Warner. Plan for discharge home with labor precautions/fetal kick counts reviewed. a/p: 28 y.o.  MYLENE 2021 @ 39.4 weeks no evidence of vaginal bleeding    1250 p Discussed with Dr Warner. Plan for discharge home with labor precautions/fetal kick counts reviewed. Pt to follow up with scheduled prenatal appointment 2021.     Suleiman, NP a/p: 28 y.o.  MYLENE 2021 @ 39.4 weeks no evidence of vaginal bleeding    1250 p Discussed with Dr Warner. Plan for discharge home when nst cat I.    1340  nst marked variability  no ctx noted    discussed with Dr Warner. Pt   Suleiman, NP a/p: 28 y.o.  MYLENE 2021 @ 39.4 weeks no evidence of vaginal bleeding    1250 p Discussed with Dr Warner. Plan for discharge home when nst cat I.    1340  nst marked variability  no ctx noted    BPP 6/8, marquise 4.6    discussed with Dr Warner. Plan for admission, IOL with PO cytotec, ampicillin prophylaxis.    Suleiman, NP

## 2024-08-31 ENCOUNTER — NON-APPOINTMENT (OUTPATIENT)
Age: 39
End: 2024-08-31

## 2024-08-31 VITALS
RESPIRATION RATE: 16 BRPM | HEART RATE: 85 BPM | TEMPERATURE: 98 F | DIASTOLIC BLOOD PRESSURE: 66 MMHG | SYSTOLIC BLOOD PRESSURE: 95 MMHG | OXYGEN SATURATION: 97 %

## 2024-08-31 RX ORDER — TIZANIDINE HYDROCHLORIDE 2 MG/1
4 CAPSULE ORAL ONCE
Refills: 0 | Status: COMPLETED | OUTPATIENT
Start: 2024-08-31 | End: 2024-08-31

## 2024-08-31 RX ORDER — METHOCARBAMOL 750 MG/1
500 TABLET, FILM COATED ORAL ONCE
Refills: 0 | Status: DISCONTINUED | OUTPATIENT
Start: 2024-08-31 | End: 2024-08-31

## 2024-08-31 RX ORDER — ACETAMINOPHEN 325 MG/1
975 TABLET ORAL ONCE
Refills: 0 | Status: COMPLETED | OUTPATIENT
Start: 2024-08-31 | End: 2024-08-31

## 2024-08-31 RX ORDER — DIPHENHYDRAMINE HCL 50 MG
25 CAPSULE ORAL ONCE
Refills: 0 | Status: COMPLETED | OUTPATIENT
Start: 2024-08-31 | End: 2024-08-31

## 2024-08-31 RX ORDER — LIDOCAINE/BENZALKONIUM/ALCOHOL
2 SOLUTION, NON-ORAL TOPICAL ONCE
Refills: 0 | Status: COMPLETED | OUTPATIENT
Start: 2024-08-31 | End: 2024-08-31

## 2024-08-31 RX ADMIN — Medication 2 PATCH: at 04:15

## 2024-08-31 RX ADMIN — TIZANIDINE HYDROCHLORIDE 4 MILLIGRAM(S): 2 CAPSULE ORAL at 06:02

## 2024-08-31 RX ADMIN — ACETAMINOPHEN 975 MILLIGRAM(S): 325 TABLET ORAL at 04:13

## 2024-08-31 RX ADMIN — Medication 25 MILLIGRAM(S): at 04:18

## 2024-08-31 NOTE — ED ADULT NURSE NOTE - OBJECTIVE STATEMENT
Patient reports pain to left side of her neck and left back. Patient AAOx4. No distress noted. Chest expansion symmetric. Skins warm, dry, and intact.

## 2024-08-31 NOTE — ED ADULT NURSE REASSESSMENT NOTE - NS ED NURSE REASSESS COMMENT FT1
Patient discharged by the provider, Discharged handle by provider. Patient ambulated to the waiting room.

## 2024-08-31 NOTE — ED PROVIDER NOTE - PROGRESS NOTE DETAILS
Kevin Lema DO (PGY2)  VSS. Reassessed patient after receiving tizanidine, lidocaine patch and acetaminophen.  Patient feeling symptomatically better.  Ambulatory with normal gait.  Advised patient to follow-up with PCP for evaluation of symptoms.  Medically cleared for discharge. Time was taken to answer all of patients questions and concerns. Return precaution instructions were given and patient understands and feels comfortable with disposition.

## 2024-08-31 NOTE — ED PROVIDER NOTE - ATTENDING CONTRIBUTION TO CARE
I performed a face-to-face evaluation of the patient and performed a history and physical examination. I agree with the history and physical examination. If this was a PA visit, I personally saw the patient with the PA and performed a substantive portion of the visit including all aspects of the medical decision making.    Multiple visits in the last month for back pain. Recent labs and CT unremarkable. P/w similar back pain. No neuro deficits or bowel/bladder incontinence. No cancer/fever/IVDA/trauma. Therefore, not likely epidural abscess, traumatic fracture, osteomyelitis, or spinal cord compression. Give muscle relaxants and pain meds. Likely dc.

## 2024-08-31 NOTE — ED PROVIDER NOTE - CLINICAL SUMMARY MEDICAL DECISION MAKING FREE TEXT BOX
Afebrile hemodynamic stable presents to ED for acute on chronic left sided back pain.  No midline tenderness C/T/L.  No red flag signs concerning for cord compression.  Physical exam notable for TTP to left paraspinal region from T6-T11.  Negative CVA.  Labs imaging warranted this time given recent results noted in HPI.  Given pain meds and muscle laxer, reassess.  Likely dispo home with close PCP follow-up.

## 2024-08-31 NOTE — ED PROVIDER NOTE - OBJECTIVE STATEMENT
38 y/o female with pmhx of factor V leiden mutation (2016) with multiple DVTs/PE on Eliquis, vitamin D deficiency,  IBS, GERD, PID, endometriosis, asthma,  left shoulder abscess with drainage, hypothyroidism, dysregulation/explosive disorder, personality disorder, mild intellectual disability, intrauterine exposure to cocaine, remote cocaine use disorder, past psychiatric admissions, 2 prior suicidal gestures/self-injurious behavior, bipolar disorder, aggressive behavior, psychogenic nonepileptic seizures, presents to ED c/o acute on chronic back pain x 2 weeks.  Patient recently seen in [facility] ED 10 times last month with most recent 3 days ago with labs WNL, CTAP 8/28 with no evidence of renal stones or collecting system obstruction, US RUQ 8/27 revealing hepatomegaly and hepatic steatosis.  Patient discharged on tizanidine noting minimal relief.  No red flag signs concerning for cord compression. No recent trauma or heavy lifting.  States that symptoms are worsened with movement and relieved with rest.  Denies fever, chills, chest pain, SOB, abdominal pain, urinary symptoms, change in bowel movement.

## 2024-08-31 NOTE — ED PROVIDER NOTE - NSFOLLOWUPINSTRUCTIONS_ED_ALL_ED_FT
Please schedule follow up appointment with PCP within the week for further evaluation of symptoms.     Please take Tylenol up to 650 mg every 6 hours as needed for pain and/or Motrin up to 600 mg every 8 hours as needed for pain    Please take any prescribed medications as instructed by your PMD    Back Pain    Back pain is very common in adults. The cause of back pain is rarely dangerous and the pain often gets better over time. The cause of your back pain may not be known and may include strain of muscles or ligaments, degeneration of the spinal disks, or arthritis. Occasionally the pain may radiate down your leg(s). Over-the-counter medicines to reduce pain and inflammation are often the most helpful. Stretching and remaining active frequently helps the healing process.    Your provider today has determined that you do not need an emergent MRI. This does not mean that you may not require an MRI as an outpatient in the future if your pain persists or if you develop additional neurologic symptoms.    It is extremely important for you to follow up with your outpatient provider for further examination and discussion regarding treatment and imaging, if necessary. If you develop any of the following symptoms, return to the ED immediately for re-evaluation:

## 2024-08-31 NOTE — ED PROVIDER NOTE - PATIENT PORTAL LINK FT
You can access the FollowMyHealth Patient Portal offered by [facility] by registering at the following website: [email or web address]. By joining [facility]’s FollowMyHealth portal, you will also be able to view your health information using other applications (apps) compatible with our system.

## 2024-08-31 NOTE — PROVIDER CONTACT NOTE (OTHER) - ASSESSMENT
Called residence, spoke with Yamileth, who states pt can travel via taxi.  Provider aware and in agreement.

## 2024-08-31 NOTE — ED PROVIDER NOTE - PHYSICAL EXAMINATION
Gen: NAD, non-toxic appearing  Head: normal appearing  HEENT: normal conjunctiva, oral mucosa moist  Lung: no respiratory distress, speaking in full sentences, CTA b/l     CV: regular rate and rhythm, no murmurs  Abd: soft, non distended, non tender   MSK: no visible deformities, no midline tenderness C/T/L; t6-t11 paraspinal tenderness   Neuro: No focal deficits, AAOx3  Skin: Warm  Psych: normal affect

## 2024-09-02 ENCOUNTER — EMERGENCY (EMERGENCY)
Facility: HOSPITAL | Age: 39
LOS: 1 days | Discharge: OTHER INSTITUTION | End: 2024-09-02
Attending: STUDENT IN AN ORGANIZED HEALTH CARE EDUCATION/TRAINING PROGRAM | Admitting: STUDENT IN AN ORGANIZED HEALTH CARE EDUCATION/TRAINING PROGRAM
Payer: MEDICARE

## 2024-09-02 ENCOUNTER — NON-APPOINTMENT (OUTPATIENT)
Age: 39
End: 2024-09-02

## 2024-09-02 VITALS
DIASTOLIC BLOOD PRESSURE: 84 MMHG | RESPIRATION RATE: 19 BRPM | HEART RATE: 96 BPM | OXYGEN SATURATION: 99 % | HEIGHT: 68 IN | TEMPERATURE: 98 F | SYSTOLIC BLOOD PRESSURE: 129 MMHG

## 2024-09-02 PROCEDURE — 99284 EMERGENCY DEPT VISIT MOD MDM: CPT

## 2024-09-02 NOTE — ED ADULT TRIAGE NOTE - CHIEF COMPLAINT QUOTE
Pt arrives from group home c/o rectal bleeding. Recently diagnosed with hemorrhoids. Hx PE, DVT on Eliquis, HTN, T2DM, Bipolar disorder, Asthma, Seizure disorder. Denies abd pain, dizziness, well appearing.

## 2024-09-02 NOTE — ED PROVIDER NOTE - OBJECTIVE STATEMENT
YODIT MORROW is a 39y Female with a hx of factor V leiden mutation (2016) with multiple DVTs/PE on Eliquis, vitamin D deficiency,  IBS, GERD, PID, endometriosis, asthma,  left shoulder abscess with drainage, hypothyroidism, dysregulation/explosive disorder, personality disorder, mild intellectual disability, intrauterine exposure to cocaine, remote cocaine use disorder, past psychiatric admissions, 2 prior suicidal gestures/self-injurious behavior, bipolar disorder, aggressive behavior, psychogenic nonepileptic seizures who presents with bright red blood per rectum.     Patient reports BRBPR x1 today. Notes prior episode about 3 weeks ago, for which she went to the ED and found to have a small hemorrhoid. Since then, no more rectal bleeding until the episode today. 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     Patient reports BRBPR x1 today. Notes prior episode about 3 weeks ago, for which she went to the ED and found to have a small hemorrhoid. Since then, no more rectal bleeding until the episode today. States no straining with bowel movements. No chest pain, SOB, lightheadedness, blurry vision. abdominal pain, diarrhea, dysuria, or hematuria.

## 2024-09-02 NOTE — ED PROVIDER NOTE - IV ALTEPLASE EXCL REL HIDDEN
Pt called requesting 90 day refill be [facility]  Starm
show

## 2024-09-02 NOTE — ED PROVIDER NOTE - PHYSICAL EXAMINATION
Physical Exam:  CONSTITUTIONAL: no apparent distress.  SKIN: No rashes or ecchymosis.  EYES: PERRLA. EOMI. No scleral icterus.  ENT: Supple, no thyromegaly. No discharge or glossitis.  CV: RRR. Normal S1 and S2. No murmurs, rubs, or gallops. No edema. Pulses equal bilaterally.  PULM: Clear to auscultation throughout. Respirations unlabored. No wheezing, rales, or rhonchi.  GI: Soft, non-tender, non-distended. No palpable masses.  MSK: No cyanosis or clubbing.   NEURO: CN grossly intact  PSYCH: A+O x3  JANIS, with Arnoldo Gregory RN, and attending Dr. Warner: negative for blood. Small external hemorrhoid.

## 2024-09-02 NOTE — ED PROVIDER NOTE - ATTENDING CONTRIBUTION TO CARE
I have personally performed a face to face medical and diagnostic evaluation of the patient. I have discussed with and reviewed the Resident's note and agree with the History, ROS, Physical Exam and MDM unless otherwise indicated. A brief summary of my personal evaluation and impression can be found below.    Alana PINA: 39-year-old female history of factor V Leiden with DVTs and PEs, vitamin D, IBS GERD PID endometriosis asthma, hypothyroidism, dysregulation explosive disorder personalities motor, mild infectious ability, recently seen in the ED for hemorrhoids and bleeding, presents with a chief complaint of x 1 episode of blood mixed with stools earlier this evening.  Otherwise she denies any acute complaints this time.  He denies any nausea vomiting fever chest pain or trouble breathing, she can move everything for everything.  Had an episode of lightheadedness dizziness that resolved yesterday as well as some back pain that is chronic unchanged today.  Denies bleeding elsewhere.  No urinary complaints.    All other ROS negative, except as above and as per HPI and ROS section.    VITALS: Initial triage and subsequent vitals have been reviewed by me.  GEN APPEARANCE: Alert, non-toxic, well-appearing, NAD.  HEAD: Atraumatic.  EYES: PERRLa, EOMI, vision grossly intact.   NECK: Supple  CV: RRR, S1S2, no c/r/m/g. Cap refill <2 seconds. No bruits.   LUNGS: CTAB. No abnormal breath sounds.  ABDOMEN: Soft, NTND. No guarding or rebound.   MSK/EXT: No spinal or extremity point tenderness. No CVA ttp. Pelvis stable. No peripheral edema.  NEURO: Alert, follows commands. Weight bearing normal. Speech normal. Sensation and motor normal x4 extremities.   SKIN: Warm, dry and intact. No rash.  PSYCH: Appropriate  Rectal exam with Dr. Jansen shows small external not thrombosed hemorrhoids JANIS without gross blood.    Plan/MDM:    Exam vitals are stable nontoxic-appearing physical exam as above DDx concern for likely small hemorrhoidal bleeding, abdomen is soft nontender low concern for acute intra-abdominal surgical pathology no fever, very well-appearing and nontoxic, given her presentation we will check labs evaluate for significant anemia, monitor in ED for recurrence of bleeding, if studies are nonactionable patient without bleeding in the ER anticipate she will likely be discharged.

## 2024-09-02 NOTE — ED PROVIDER NOTE - PROGRESS NOTE DETAILS
CBC with Hg stable at 11. No chest pain, SOB, lightheadedness, or blurry vision on reassessment. Plan to discharge.

## 2024-09-02 NOTE — ED PROVIDER NOTE - CLINICAL SUMMARY MEDICAL DECISION MAKING FREE TEXT BOX
YODIT MORROW is a 39y Female with a hx of factor V leiden mutation (2016) with multiple DVTs/PE on Eliquis, vitamin D deficiency,  IBS, GERD, PID, endometriosis, asthma,  left shoulder abscess with drainage, hypothyroidism, dysregulation/explosive disorder, personality disorder, mild intellectual disability, intrauterine exposure to cocaine, remote cocaine use disorder, past psychiatric admissions, 2 prior suicidal gestures/self-injurious behavior, bipolar disorder, aggressive behavior, psychogenic nonepileptic seizures who presents with bright red blood per rectum. Prior hx of hemorrhoid and constipation. No further episodes. With chaperone Arnoldo Gregory RN, and attending, JANIS demonstrates small external hemorrhoid without blood. Will obtain CBC. If stable, will likely discharge.

## 2024-09-02 NOTE — ED PROVIDER NOTE - NSFOLLOWUPCLINICS_GEN_ALL_ED_FT
[facility]  Gastroenterology  [address] [place] [address]  Phone: [phone]  Fax:   Follow Up Time: 7-10 Days

## 2024-09-02 NOTE — ED PROVIDER NOTE - PATIENT PORTAL LINK FT
You can access the FollowMyHealth Patient Portal offered by [facility] by registering at the following website: [email or web address]. By joining [facility]’s FollowMyHealth portal, you will also be able to view your health information using other applications (apps) compatible with our system.

## 2024-09-02 NOTE — ED ADULT NURSE NOTE - NS ED PATIENT SAFETY CONCERN
Problem: Discharge Planning  Goal: Discharge to home or other facility with appropriate resources  5/1/2023 0948 by Foster Simon RN  Outcome: Progressing  9/87/0544 [id number] by Sindhu Rascon RN  Outcome: Adequate for Discharge     Problem: Pain  Goal: Verbalizes/displays adequate comfort level or baseline comfort level  5/1/2023 0948 by Foster Simon RN  Outcome: Progressing  7/13/4198 3322 by Sindhu Rascon RN  Outcome: Adequate for Discharge No

## 2024-09-02 NOTE — ED PROVIDER NOTE - NSFOLLOWUPINSTRUCTIONS_ED_ALL_ED_FT
You presented with blood in your stool due to your hemorrhoids. Your labwork demonstrated your blood levels were stable. We recommend you follow-up with a GI doctor to address the hemorrhoids.    If you have worsening bleeding, chest pain, or lightheadedness, please return to the ED.

## 2024-09-02 NOTE — ED PROVIDER NOTE - CARE PLAN
Principal Discharge DX:	BRBPR (bright red blood per rectum)   1 Principal Discharge DX:	Occult blood in stools

## 2024-09-02 NOTE — ED PROVIDER NOTE - SPECIALTY CARE
CLINICAL NUTRITION SERVICES - ASSESSMENT NOTE     Nutrition Prescription    RECOMMENDATIONS FOR MDs/PROVIDERS TO ORDER:  None    Malnutrition Status:    Patient does not meet two of the established criteria necessary for diagnosing malnutrition    Recommendations already ordered by Registered Dietitian (RD):  Discharge diet instruction written     Future/Additional Recommendations:  Education review on acutely increased energy and protein needs, 6-8 weeks post surgery and long term recommendation for heart healthy (low saturated fat, low sodium) diet and food safety precautions.      REASON FOR ASSESSMENT  Pt is a 70 year old male seen by the dietitian for MD Order- Assess & Educate post-op SOT    PMH of HTN, gout, and ESRD/HD secondary to focal segmental glomerulosclerosis (FSGS) maintained on hemodialysis since  admitted on 10/28/2023 for a  donor kidney transplant (DDKT).      NUTRITION HISTORY  POD 1 DDKT  Per chart review, pt seen by outpatient RD 2021.  Appetite good at that time, pt trying to eat a lower Na+ diet  Diet recall    Breakfast Oatmeal with apples    Lunch Sub sandwich; [employer]; ham and cheese s/w that he brings from home    Dinner Seafood, ribs, leftovers + veggie; has reduced potatoes d/t K    Snacks Very occasional beef jerky    Beverages Water, black tea, coffee    Alcohol 1-2 beers/week    Dining out 2x/week      Per visit with patient:  Pt reports feeling well and eating well pre transplant.  Has been followed monthly by RD at dialysis center.  Aware of high K+ level.    CURRENT NUTRITION ORDERS  Diet: 2 g Potassium  Intake/Tolerance: Meal not yet ordered    LABS  Labs reviewed, K+ 5.9 (H) <-- 6.7 (H) - shifting   (H)  Phos 4.9 (H) <-- 2.4 (L), no exogenous phos given per review of MAR    MEDICATIONS  Medications reviewed and notable for lasix gtt, D5LR @ 75 ml/hr, lipitor, mycophenolate, miralax, senna, tacrolimus, valcyte    ANTHROPOMETRICS  Height: 175.9 cm  "(5'9.25\")  Most Recent Weight: 80.9 kg (178 lb)    IBW: 73 kg  BMI: Overweight BMI 25-29.9 (26 kg/m2)  Weight History:     Wt Readings from Last 20 Encounters:   10/29/23 80.9 kg (178 lb 6.4 oz)   08/03/23 81.7 kg (180 lb 1.9 oz)   06/07/23 77.5 kg (170 lb 14.4 oz)   06/07/23 77.2 kg (170 lb 4.8 oz)   05/10/23 81.2 kg (179 lb)   01/26/23 79.8 kg (176 lb)   05/31/22 83.9 kg (185 lb)   01/06/22 80.3 kg (177 lb)   12/14/21 80.7 kg (177 lb 14.4 oz)   11/18/21 82.6 kg (182 lb)   11/01/21 82.6 kg (182 lb)   10/22/21 80 kg (176 lb 6.4 oz)   09/02/21 85.3 kg (188 lb)   02/01/21 87.1 kg (192 lb)   01/05/21 87.1 kg (192 lb)   11/30/20 88 kg (194 lb)   08/13/20 87.1 kg (192 lb)   06/25/19 87.1 kg (192 lb)   01/24/19 84.4 kg (186 lb)   08/08/18 81 kg (178 lb 8 oz)       Dosing Weight: 81 kg    ASSESSED NUTRITION NEEDS:  Estimated Energy Needs: 2430 - 2835 kcals (30-35 Kcal/Kg)  Justification: increased needs post-op SOT  Estimated Protein Needs: 105 - 162 grams protein (1.3-2 gm/Kg)  Justification: increased needs post op SOT  Estimated Fluid Needs: 2025 - 2430  mL (25-30 mL/Kg)  Justification: maintenance, or per MD pending fluid status and adequate UOP    MALNUTRITION  % Intake: No decreased intake noted  % Weight Loss: None noted  Subcutaneous Fat Loss: None observed  Muscle Loss: None observed  Fluid Accumulation/Edema: None noted  Malnutrition Diagnosis: Patient does not meet two of the established criteria necessary for diagnosing malnutrition    NUTRITION DIAGNOSIS:  Food and nutrition-related knowledge deficit r/t length of time since previous post-transplant education AEB patient verbal report, review of chart record, and MD consult for nutrition education.     INTERVENTIONS  Implementation  Nutrition Education:  -- Provided instruction on post-transplant diet with discussion regarding protein sources and high protein needs in acute post-tx phase.   --Discussed monitoring of K+/Phos lab values with possible need for " "adjustment of these in the diet as necessary.  Provided handout \"Potassium content of foods\"    --Reviewed need for food safety precautions to prevent food borne illness.  Provided & reviewed handout: Post-transplant diet guidelines. Patient receptive to information provided. Expected diet compliance is good.     Goals  1. Patient will verbalize understanding of 3 important aspects of post-transplant diet guidelines.   2. PO intake >50% meals TID.    Monitoring/Evaluation  Progress toward goals will be monitored and evaluated per protocol.    Shanda Kasper RD, LD, [facility]  [address] RD Pager: [phone]  Weekend/Holiday RD pager [phone]        " Gastroenterology

## 2024-09-02 NOTE — ED ADULT NURSE NOTE - OBJECTIVE STATEMENT
Pt received to room 16, A&O x 4, ambulatory, pmhx DVT on Eliquis, GERD, endometriosis, hypothyroidism, intellectual disabillity, cocaine use, complex care note, coming to ED from [facility] with complaints of rectal bleeding. Pt reports 1 episode of BRBPR after using the bathroom, reports bleeding notred mixed in with stool, denies dark or tarry stool, has had prior episodes and found to have hemorrhoid. Pt denies HA, dizziness, chest pain, SOB, nausea, vomiting, diarrhea, urinary abnormalities, fevers, chills, diarrhea. Labs drawn and sent, meal tray provided, rectal exam completed with MD Harmon and MD Jansen. Safety maintained, comfort provided, awaiting results.

## 2024-09-03 VITALS
DIASTOLIC BLOOD PRESSURE: 86 MMHG | HEART RATE: 88 BPM | TEMPERATURE: 98 F | OXYGEN SATURATION: 100 % | RESPIRATION RATE: 18 BRPM | SYSTOLIC BLOOD PRESSURE: 134 MMHG

## 2024-09-03 LAB
HCT VFR BLD CALC: 34.2 % — LOW (ref 34.5–45)
HGB BLD-MCNC: 11 G/DL — LOW (ref 11.5–15.5)
MCHC RBC-ENTMCNC: 31.3 PG — SIGNIFICANT CHANGE UP (ref 27–34)
MCHC RBC-ENTMCNC: 32.2 GM/DL — SIGNIFICANT CHANGE UP (ref 32–36)
MCV RBC AUTO: 97.2 FL — SIGNIFICANT CHANGE UP (ref 80–100)
NRBC # BLD: 0 /100 WBCS — SIGNIFICANT CHANGE UP (ref 0–0)
NRBC # FLD: 0 K/UL — SIGNIFICANT CHANGE UP (ref 0–0)
PLATELET # BLD AUTO: 119 K/UL — LOW (ref 150–400)
RBC # BLD: 3.52 M/UL — LOW (ref 3.8–5.2)
RBC # FLD: 15.4 % — HIGH (ref 10.3–14.5)
WBC # BLD: 4.75 K/UL — SIGNIFICANT CHANGE UP (ref 3.8–10.5)
WBC # FLD AUTO: 4.75 K/UL — SIGNIFICANT CHANGE UP (ref 3.8–10.5)

## 2024-09-03 RX ORDER — DIPHENHYDRAMINE HCL 50 MG
25 CAPSULE ORAL ONCE
Refills: 0 | Status: COMPLETED | OUTPATIENT
Start: 2024-09-03 | End: 2024-09-03

## 2024-09-03 RX ORDER — CYCLOBENZAPRINE HCL 10 MG
10 TABLET ORAL ONCE
Refills: 0 | Status: COMPLETED | OUTPATIENT
Start: 2024-09-03 | End: 2024-09-03

## 2024-09-03 RX ADMIN — Medication 10 MILLIGRAM(S): at 00:31

## 2024-09-03 RX ADMIN — Medication 25 MILLIGRAM(S): at 00:31

## 2024-09-03 NOTE — ED ADULT NURSE REASSESSMENT NOTE - NS ED NURSE REASSESS COMMENT FT1
Patients discharged ,Group home, [facility] ([phone]) called and spoke with Brenna  who reported the patient should travel back via Taxi. Address verified as [address] [place] [address] [address]ra Marin's cab called [phone] for transportation set up. Interdisciplinary huddle completed

## 2024-09-04 ENCOUNTER — NON-APPOINTMENT (OUTPATIENT)
Age: 39
End: 2024-09-04

## 2024-09-04 ENCOUNTER — EMERGENCY (EMERGENCY)
Facility: HOSPITAL | Age: 39
LOS: 1 days | Discharge: HOME | End: 2024-09-04
Attending: STUDENT IN AN ORGANIZED HEALTH CARE EDUCATION/TRAINING PROGRAM
Payer: MEDICARE

## 2024-09-04 VITALS
HEIGHT: 68 IN | TEMPERATURE: 98 F | DIASTOLIC BLOOD PRESSURE: 80 MMHG | OXYGEN SATURATION: 100 % | RESPIRATION RATE: 17 BRPM | SYSTOLIC BLOOD PRESSURE: 119 MMHG | HEART RATE: 84 BPM

## 2024-09-04 VITALS
HEART RATE: 82 BPM | RESPIRATION RATE: 16 BRPM | TEMPERATURE: 98 F | DIASTOLIC BLOOD PRESSURE: 78 MMHG | OXYGEN SATURATION: 99 % | SYSTOLIC BLOOD PRESSURE: 114 MMHG

## 2024-09-04 VITALS
DIASTOLIC BLOOD PRESSURE: 76 MMHG | OXYGEN SATURATION: 97 % | SYSTOLIC BLOOD PRESSURE: 111 MMHG | HEART RATE: 90 BPM | RESPIRATION RATE: 18 BRPM | TEMPERATURE: 98 F | HEIGHT: 68 IN | WEIGHT: 293 LBS

## 2024-09-04 LAB
ALBUMIN SERPL ELPH-MCNC: 4.1 G/DL — SIGNIFICANT CHANGE UP (ref 3.5–5)
ALP SERPL-CCNC: 65 U/L — SIGNIFICANT CHANGE UP (ref 40–120)
ALT FLD-CCNC: 59 U/L DA — SIGNIFICANT CHANGE UP (ref 10–60)
ANION GAP SERPL CALC-SCNC: 6 MMOL/L — SIGNIFICANT CHANGE UP (ref 5–17)
APPEARANCE UR: ABNORMAL
AST SERPL-CCNC: 36 U/L — SIGNIFICANT CHANGE UP (ref 10–40)
BACTERIA # UR AUTO: ABNORMAL /HPF
BASOPHILS # BLD AUTO: 0.01 K/UL — SIGNIFICANT CHANGE UP (ref 0–0.2)
BASOPHILS NFR BLD AUTO: 0.2 % — SIGNIFICANT CHANGE UP (ref 0–2)
BILIRUB SERPL-MCNC: 0.3 MG/DL — SIGNIFICANT CHANGE UP (ref 0.2–1.2)
BILIRUB UR-MCNC: ABNORMAL
BUN SERPL-MCNC: 10 MG/DL — SIGNIFICANT CHANGE UP (ref 7–18)
CALCIUM SERPL-MCNC: 9.6 MG/DL — SIGNIFICANT CHANGE UP (ref 8.4–10.5)
CHLORIDE SERPL-SCNC: 107 MMOL/L — SIGNIFICANT CHANGE UP (ref 96–108)
CO2 SERPL-SCNC: 27 MMOL/L — SIGNIFICANT CHANGE UP (ref 22–31)
COLOR SPEC: SIGNIFICANT CHANGE UP
CREAT SERPL-MCNC: 0.95 MG/DL — SIGNIFICANT CHANGE UP (ref 0.5–1.3)
DIFF PNL FLD: NEGATIVE — SIGNIFICANT CHANGE UP
EGFR: 79 ML/MIN/1.73M2 — SIGNIFICANT CHANGE UP
EGFR: 79 ML/MIN/1.73M2 — SIGNIFICANT CHANGE UP
EOSINOPHIL # BLD AUTO: 0.01 K/UL — SIGNIFICANT CHANGE UP (ref 0–0.5)
EOSINOPHIL NFR BLD AUTO: 0.2 % — SIGNIFICANT CHANGE UP (ref 0–6)
EPI CELLS # UR: PRESENT
GLUCOSE SERPL-MCNC: 96 MG/DL — SIGNIFICANT CHANGE UP (ref 70–99)
GLUCOSE UR QL: NEGATIVE MG/DL — SIGNIFICANT CHANGE UP
HCG SERPL-ACNC: <1 MIU/ML — SIGNIFICANT CHANGE UP
HCT VFR BLD CALC: 35.4 % — SIGNIFICANT CHANGE UP (ref 34.5–45)
HGB BLD-MCNC: 11.5 G/DL — SIGNIFICANT CHANGE UP (ref 11.5–15.5)
IMM GRANULOCYTES NFR BLD AUTO: 1 % — HIGH (ref 0–0.9)
KETONES UR-MCNC: 15 MG/DL
LEUKOCYTE ESTERASE UR-ACNC: NEGATIVE — SIGNIFICANT CHANGE UP
LIDOCAIN IGE QN: 34 U/L — SIGNIFICANT CHANGE UP (ref 13–75)
LYMPHOCYTES # BLD AUTO: 1.74 K/UL — SIGNIFICANT CHANGE UP (ref 1–3.3)
LYMPHOCYTES # BLD AUTO: 35.7 % — SIGNIFICANT CHANGE UP (ref 13–44)
MAGNESIUM SERPL-MCNC: 2 MG/DL — SIGNIFICANT CHANGE UP (ref 1.6–2.6)
MCHC RBC-ENTMCNC: 31.9 PG — SIGNIFICANT CHANGE UP (ref 27–34)
MCHC RBC-ENTMCNC: 32.5 GM/DL — SIGNIFICANT CHANGE UP (ref 32–36)
MCV RBC AUTO: 98.1 FL — SIGNIFICANT CHANGE UP (ref 80–100)
MONOCYTES # BLD AUTO: 0.58 K/UL — SIGNIFICANT CHANGE UP (ref 0–0.9)
MONOCYTES NFR BLD AUTO: 11.9 % — SIGNIFICANT CHANGE UP (ref 2–14)
NEUTROPHILS # BLD AUTO: 2.48 K/UL — SIGNIFICANT CHANGE UP (ref 1.8–7.4)
NEUTROPHILS NFR BLD AUTO: 51 % — SIGNIFICANT CHANGE UP (ref 43–77)
NITRITE UR-MCNC: NEGATIVE — SIGNIFICANT CHANGE UP
NRBC # BLD: 0 /100 WBCS — SIGNIFICANT CHANGE UP (ref 0–0)
NRBC BLD-RTO: 0 /100 WBCS — SIGNIFICANT CHANGE UP (ref 0–0)
PH UR: 6 — SIGNIFICANT CHANGE UP (ref 5–8)
PLATELET # BLD AUTO: 116 K/UL — LOW (ref 150–400)
POTASSIUM SERPL-MCNC: 4.4 MMOL/L — SIGNIFICANT CHANGE UP (ref 3.5–5.3)
POTASSIUM SERPL-SCNC: 4.4 MMOL/L — SIGNIFICANT CHANGE UP (ref 3.5–5.3)
PROT SERPL-MCNC: 7.8 G/DL — SIGNIFICANT CHANGE UP (ref 6–8.3)
PROT UR-MCNC: 30 MG/DL
RBC # BLD: 3.61 M/UL — LOW (ref 3.8–5.2)
RBC # FLD: 15.1 % — HIGH (ref 10.3–14.5)
RBC CASTS # UR COMP ASSIST: 4 /HPF — SIGNIFICANT CHANGE UP (ref 0–4)
SODIUM SERPL-SCNC: 140 MMOL/L — SIGNIFICANT CHANGE UP (ref 135–145)
SP GR SPEC: 1.03 — HIGH (ref 1–1.03)
UROBILINOGEN FLD QL: 1 MG/DL — SIGNIFICANT CHANGE UP (ref 0.2–1)
WBC # BLD: 4.87 K/UL — SIGNIFICANT CHANGE UP (ref 3.8–10.5)
WBC # FLD AUTO: 4.87 K/UL — SIGNIFICANT CHANGE UP (ref 3.8–10.5)
WBC UR QL: 2 /HPF — SIGNIFICANT CHANGE UP (ref 0–5)

## 2024-09-04 PROCEDURE — 83690 ASSAY OF LIPASE: CPT

## 2024-09-04 PROCEDURE — 96374 THER/PROPH/DIAG INJ IV PUSH: CPT

## 2024-09-04 PROCEDURE — 84702 CHORIONIC GONADOTROPIN TEST: CPT

## 2024-09-04 PROCEDURE — 74176 CT ABD & PELVIS W/O CONTRAST: CPT | Mod: 26,MC

## 2024-09-04 PROCEDURE — 85025 COMPLETE CBC W/AUTO DIFF WBC: CPT

## 2024-09-04 PROCEDURE — 96375 TX/PRO/DX INJ NEW DRUG ADDON: CPT

## 2024-09-04 PROCEDURE — 99282 EMERGENCY DEPT VISIT SF MDM: CPT

## 2024-09-04 PROCEDURE — 81001 URINALYSIS AUTO W/SCOPE: CPT

## 2024-09-04 PROCEDURE — 99284 EMERGENCY DEPT VISIT MOD MDM: CPT | Mod: 25

## 2024-09-04 PROCEDURE — 83735 ASSAY OF MAGNESIUM: CPT

## 2024-09-04 PROCEDURE — 36415 COLL VENOUS BLD VENIPUNCTURE: CPT

## 2024-09-04 PROCEDURE — 80053 COMPREHEN METABOLIC PANEL: CPT

## 2024-09-04 PROCEDURE — 74176 CT ABD & PELVIS W/O CONTRAST: CPT | Mod: MC

## 2024-09-04 PROCEDURE — L9995: CPT

## 2024-09-04 PROCEDURE — 99285 EMERGENCY DEPT VISIT HI MDM: CPT

## 2024-09-04 RX ORDER — ACETAMINOPHEN 500 MG/5ML
1000 LIQUID (ML) ORAL ONCE
Refills: 0 | Status: COMPLETED | OUTPATIENT
Start: 2024-09-04 | End: 2024-09-04

## 2024-09-04 RX ORDER — CYCLOBENZAPRINE HYDROCHLORIDE 15 MG/1
10 CAPSULE, EXTENDED RELEASE ORAL ONCE
Refills: 0 | Status: COMPLETED | OUTPATIENT
Start: 2024-09-04 | End: 2024-09-04

## 2024-09-04 RX ORDER — DIPHENHYDRAMINE HCL 12.5MG/5ML
50 ELIXIR ORAL ONCE
Refills: 0 | Status: COMPLETED | OUTPATIENT
Start: 2024-09-04 | End: 2024-09-04

## 2024-09-04 RX ORDER — KETOROLAC TROMETHAMINE 30 MG/ML
15 INJECTION, SOLUTION INTRAMUSCULAR; INTRAVENOUS ONCE
Refills: 0 | Status: DISCONTINUED | OUTPATIENT
Start: 2024-09-04 | End: 2024-09-04

## 2024-09-04 RX ORDER — CYCLOBENZAPRINE HYDROCHLORIDE 15 MG/1
1 CAPSULE, EXTENDED RELEASE ORAL
Qty: 14 | Refills: 0
Start: 2024-09-04 | End: 2024-09-10

## 2024-09-04 RX ADMIN — KETOROLAC TROMETHAMINE 15 MILLIGRAM(S): 30 INJECTION, SOLUTION INTRAMUSCULAR; INTRAVENOUS at 18:20

## 2024-09-04 RX ADMIN — KETOROLAC TROMETHAMINE 15 MILLIGRAM(S): 30 INJECTION, SOLUTION INTRAMUSCULAR; INTRAVENOUS at 17:50

## 2024-09-04 RX ADMIN — Medication 20 MILLIGRAM(S): at 17:31

## 2024-09-04 RX ADMIN — Medication 50 MILLIGRAM(S): at 17:50

## 2024-09-04 RX ADMIN — Medication 1000 MILLIGRAM(S): at 18:03

## 2024-09-04 RX ADMIN — Medication 1000 MILLIGRAM(S): at 17:48

## 2024-09-04 RX ADMIN — CYCLOBENZAPRINE HYDROCHLORIDE 10 MILLIGRAM(S): 15 CAPSULE, EXTENDED RELEASE ORAL at 17:50

## 2024-09-04 RX ADMIN — Medication 400 MILLIGRAM(S): at 17:33

## 2024-09-06 ENCOUNTER — APPOINTMENT (OUTPATIENT)
Dept: TRAUMA SURGERY | Facility: HOSPITAL | Age: 39
End: 2024-09-06

## 2024-09-06 NOTE — ED ADULT TRIAGE NOTE - NS ED TRIAGE AVPU SCALE
"[facility] PSYCHIATRY  PROGRESS NOTE     SUBJECTIVE     Prior to interviewing the patient, I met with nursing and reviewed patient's clinical condition. We discussed clinical care both before and after the interview. I have reviewed the patient's clinical course by review of records including previous notes, labs, and vital signs.     Per nursing, the patient had the following behavioral events over the last 24-hours: okay    On psychiatric interview, patient was met on open unit. She states \"I am just okay\". She states she is looking forward to her interview today with her group home. She was encouraged to make contact with guardian. She denies SI, no plan, no intent.  She denies any physical pain.      MEDICATIONS   Scheduled Meds:  Current Facility-Administered Medications   Medication Dose Route Frequency Provider Last Rate Last Admin    paliperidone ER (INVEGA) 24 hr tablet 6 mg  6 mg Oral Daily Carol Horton, NP   6 mg at 09/05/24 0823     PRN Meds:.  Current Facility-Administered Medications   Medication Dose Route Frequency Provider Last Rate Last Admin    acetaminophen (TYLENOL) tablet 650 mg  650 mg Oral Q4H PRN Jamshid Rdz APRN CNP   650 mg at 09/05/24 1224    hydrOXYzine HCl (ATARAX) tablet 50 mg  50 mg Oral Q6H PRN Saturnino Hortonle, NP   50 mg at 09/05/24 1710    LORazepam (ATIVAN) tablet 1 mg  1 mg Oral Q6H PRN Carol Horton, NP   1 mg at 09/05/24 1814    Or    LORazepam (ATIVAN) injection 1 mg  1 mg Intramuscular Q6H PRN Carol Horton, NP        nicotine (NICORETTE) gum 2 mg  2 mg Buccal Q1H PRN Jamshid Rdz APRN CNP   2 mg at 09/05/24 1921    OLANZapine (zyPREXA) tablet 10 mg  10 mg Oral TID PRN Jamshid Rdz APRN CNP   10 mg at 09/04/24 1720    Or    OLANZapine (zyPREXA) injection 10 mg  10 mg Intramuscular TID PRN Jamshid Rdz APRN CNP        traZODone (DESYREL) tablet 50 mg  50 mg Oral At Bedtime PRN Carol Horton, NP   50 mg at 09/05/24 1920        ALLERGIES   Allergies " "  Allergen Reactions    Other [No Clinical Screening - See Comments] Anaphylaxis     \"Sodium Penathol\" per mother. Used in anesthesia.  Family hx of reaction.    Thiopental Anaphylaxis     Other reaction(s): Other - Describe In Comment Field    Family Hx of death with this medication    Great grandmother  on the operating table to this medication; worried about allergies to sodium      Other reaction(s): Other - Describe In Comment Field Family Hx of death with this medication    Latex Rash    Amoxicillin Trihydrate GI Disturbance    Amoxicillin-Pot Clavulanate GI Disturbance    Phenobarbital         MENTAL STATUS EXAM   Vitals: /77   Pulse 104   Temp 97.3  F (36.3  C) (Temporal)   Resp 16   Wt 54.8 kg (120 lb 14.4 oz)   SpO2 96%   BMI 18.94 kg/m      Appearance:  awake, alert, dressed in hospital scrubs, appeared as age stated, and slightly unkempt  Attitude:  cooperative  Eye Contact:  good  Mood: \"I am just okay\"  Affect:  mood congruent  Speech:  clear, coherent  Psychomotor Behavior:  no evidence of tardive dyskinesia, dystonia, or tics, restless  Thought Process:  concrete  Associations:  no loose associations  Thought Content:  no evidence of suicidal ideation or homicidal ideation, denies AH today  Insight: fair   Judgment:  fair   Oriented to:  time, person, and place  Attention Span and Concentration:  fair  Recent and Remote Memory:  fair  Fund of Knowledge: low-normal  Muscle Strength and Tone: normal  Gait and Station: Normal       LABS   No results found for this or any previous visit (from the past 24 hour(s)).      IMPRESSION     This is a 24 year old female with a PMH of schizoaffective disorder- bipolar type, KIRSTY, intellectual disability, stimulant use disorder, and cannabis use disorder who presents to the ED initially noting multiple odd somatic complaints, such as her heart no longer beating. Later reporting auditory hallucinations. Was in agreement to admission to get restarted " "back on medication. Patient has not taken any medication since she left the hospital in early July. She missed her post hospital follow up and did not attend her psychiatry appointment. She did not get her Invega Sustenna injection that was due 8/5. Patient reports she does not have rides to appointments and just forgets to go. Patient does report some ongoing hallucinations today, denies any active SI. She is not interested in continuing the Invega Sustenna FRIAS, noting that it was \"too much\". She is willing to restart the oral Invega today, noting that she does want the hallucinations to stop. Patient does have guardian in place, who agrees with hospitalization for stabilization. Patient does not seem to have any insight into need for stabilization, is not able to verbalize how she will attend any future appointments and has history of medication noncompliance when outside of the hospital setting.       Today: interview with group home today     DIAGNOSES     #. Schizoaffective disorder, bipolar type  #. Generalized anxiety disorder  #. Intellectual disability  #. Stimulant (methamphetamine) use disorder, severe  #. Medication noncompliance       PLAN     Location: Unit 5  Legal Status: Orders Placed This Encounter      Voluntary  Has guardian    Safety Assessment:    Behavioral Orders   Procedures    Code 1 - Restrict to Unit    Discontinue 1:1 attendant for suicide risk     Order Specific Question:   I have performed an in person assessment of the patient     Answer:   Based on this assessment the patient no longer requires a one on one attendant at this point in time.     Order Specific Question:   Rationale     Answer:   Patient States able to remain safe in hospital    Routine Programming     As clinically indicated    Status 15     Every 15 minutes.      PTA psychotropic medications stopped:     -Invega Sustenna 117 mg IM- pt missed last dose, not interested in taking     PTA psychotropic medications " continued/changed:     -restart Invega 3 mg daily-> 6 mg daily on 8/29    New medications tried and stopped:     -None    New medications initiated:     -standard unit PRN medications    Today's Changes:  -no changes , has interview with group home today    Programming: Patient will be treated in a therapeutic milieu with appropriate individual and group therapies. Education will be provided on diagnoses, medications, and treatments.     Medical diagnoses:  Per medicine    Consult: None  Tests: None    Anticipated LOS: > 7 days  Disposition: likely  or other structured setting       ATTESTATION      Binh Salcido MD  [facility]  Type of Service: video visit for mental health treatment  Reason for Video Visit: COVID-19 [place]  Originating Site (patient location): [facility]  Distant Site (provider location): Remote Location  Mode of Communication: Video Conference via [employer]  Time of Service: Date: 09/05/2024 , Start:11:00 end: 11:30        Alert-The patient is alert, awake and responds to voice. The patient is oriented to time, place, and person. The triage nurse is able to obtain subjective information.

## 2024-09-07 ENCOUNTER — NON-APPOINTMENT (OUTPATIENT)
Age: 39
End: 2024-09-07

## 2024-09-07 ENCOUNTER — EMERGENCY (EMERGENCY)
Facility: HOSPITAL | Age: 39
LOS: 1 days | Discharge: HOME | End: 2024-09-07
Attending: EMERGENCY MEDICINE | Admitting: EMERGENCY MEDICINE
Payer: MEDICARE

## 2024-09-07 VITALS
TEMPERATURE: 98 F | WEIGHT: 293 LBS | HEIGHT: 68 IN | OXYGEN SATURATION: 95 % | DIASTOLIC BLOOD PRESSURE: 69 MMHG | SYSTOLIC BLOOD PRESSURE: 95 MMHG | RESPIRATION RATE: 18 BRPM | HEART RATE: 102 BPM

## 2024-09-07 VITALS
DIASTOLIC BLOOD PRESSURE: 94 MMHG | HEART RATE: 68 BPM | OXYGEN SATURATION: 97 % | TEMPERATURE: 98 F | RESPIRATION RATE: 16 BRPM | SYSTOLIC BLOOD PRESSURE: 125 MMHG

## 2024-09-07 LAB
ALBUMIN SERPL ELPH-MCNC: 4 G/DL — SIGNIFICANT CHANGE UP (ref 3.3–5)
ALP SERPL-CCNC: 67 U/L — SIGNIFICANT CHANGE UP (ref 40–120)
ALT FLD-CCNC: 42 U/L — HIGH (ref 4–33)
ANION GAP SERPL CALC-SCNC: 14 MMOL/L — SIGNIFICANT CHANGE UP (ref 7–14)
APPEARANCE UR: ABNORMAL
AST SERPL-CCNC: 27 U/L — SIGNIFICANT CHANGE UP (ref 4–32)
BASOPHILS # BLD AUTO: 0 K/UL — SIGNIFICANT CHANGE UP (ref 0–0.2)
BASOPHILS NFR BLD AUTO: 0 % — SIGNIFICANT CHANGE UP (ref 0–2)
BILIRUB SERPL-MCNC: 0.2 MG/DL — SIGNIFICANT CHANGE UP (ref 0.2–1.2)
BILIRUB UR-MCNC: NEGATIVE — SIGNIFICANT CHANGE UP
BUN SERPL-MCNC: 11 MG/DL — SIGNIFICANT CHANGE UP (ref 7–23)
CALCIUM SERPL-MCNC: 9.5 MG/DL — SIGNIFICANT CHANGE UP (ref 8.4–10.5)
CHLORIDE SERPL-SCNC: 103 MMOL/L — SIGNIFICANT CHANGE UP (ref 98–107)
CO2 SERPL-SCNC: 21 MMOL/L — LOW (ref 22–31)
COLOR SPEC: YELLOW — SIGNIFICANT CHANGE UP
CREAT SERPL-MCNC: 0.75 MG/DL — SIGNIFICANT CHANGE UP (ref 0.5–1.3)
DIFF PNL FLD: NEGATIVE — SIGNIFICANT CHANGE UP
EGFR: 105 ML/MIN/1.73M2 — SIGNIFICANT CHANGE UP
EOSINOPHIL # BLD AUTO: 0.04 K/UL — SIGNIFICANT CHANGE UP (ref 0–0.5)
EOSINOPHIL NFR BLD AUTO: 0.9 % — SIGNIFICANT CHANGE UP (ref 0–6)
GLUCOSE SERPL-MCNC: 100 MG/DL — HIGH (ref 70–99)
GLUCOSE UR QL: NEGATIVE MG/DL — SIGNIFICANT CHANGE UP
HCG SERPL-ACNC: <1 MIU/ML — SIGNIFICANT CHANGE UP
HCT VFR BLD CALC: 36.6 % — SIGNIFICANT CHANGE UP (ref 34.5–45)
HGB BLD-MCNC: 12.1 G/DL — SIGNIFICANT CHANGE UP (ref 11.5–15.5)
IANC: 2.08 K/UL — SIGNIFICANT CHANGE UP (ref 1.8–7.4)
KETONES UR-MCNC: NEGATIVE MG/DL — SIGNIFICANT CHANGE UP
LEUKOCYTE ESTERASE UR-ACNC: NEGATIVE — SIGNIFICANT CHANGE UP
LIDOCAIN IGE QN: 30 U/L — SIGNIFICANT CHANGE UP (ref 7–60)
LYMPHOCYTES # BLD AUTO: 1.48 K/UL — SIGNIFICANT CHANGE UP (ref 1–3.3)
LYMPHOCYTES # BLD AUTO: 32.4 % — SIGNIFICANT CHANGE UP (ref 13–44)
MCHC RBC-ENTMCNC: 31.8 PG — SIGNIFICANT CHANGE UP (ref 27–34)
MCHC RBC-ENTMCNC: 33.1 GM/DL — SIGNIFICANT CHANGE UP (ref 32–36)
MCV RBC AUTO: 96.1 FL — SIGNIFICANT CHANGE UP (ref 80–100)
MONOCYTES # BLD AUTO: 0.37 K/UL — SIGNIFICANT CHANGE UP (ref 0–0.9)
MONOCYTES NFR BLD AUTO: 8.1 % — SIGNIFICANT CHANGE UP (ref 2–14)
NEUTROPHILS # BLD AUTO: 2.34 K/UL — SIGNIFICANT CHANGE UP (ref 1.8–7.4)
NEUTROPHILS NFR BLD AUTO: 51.4 % — SIGNIFICANT CHANGE UP (ref 43–77)
NITRITE UR-MCNC: NEGATIVE — SIGNIFICANT CHANGE UP
PH UR: 7.5 — SIGNIFICANT CHANGE UP (ref 5–8)
PLATELET # BLD AUTO: 99 K/UL — LOW (ref 150–400)
POTASSIUM SERPL-MCNC: 5 MMOL/L — SIGNIFICANT CHANGE UP (ref 3.5–5.3)
POTASSIUM SERPL-SCNC: 5 MMOL/L — SIGNIFICANT CHANGE UP (ref 3.5–5.3)
PROT SERPL-MCNC: 6.5 G/DL — SIGNIFICANT CHANGE UP (ref 6–8.3)
PROT UR-MCNC: SIGNIFICANT CHANGE UP MG/DL
RBC # BLD: 3.81 M/UL — SIGNIFICANT CHANGE UP (ref 3.8–5.2)
RBC # FLD: 14.7 % — HIGH (ref 10.3–14.5)
SODIUM SERPL-SCNC: 138 MMOL/L — SIGNIFICANT CHANGE UP (ref 135–145)
SP GR SPEC: 1.02 — SIGNIFICANT CHANGE UP (ref 1–1.03)
UROBILINOGEN FLD QL: 1 MG/DL — SIGNIFICANT CHANGE UP (ref 0.2–1)
WBC # BLD: 4.56 K/UL — SIGNIFICANT CHANGE UP (ref 3.8–10.5)
WBC # FLD AUTO: 4.56 K/UL — SIGNIFICANT CHANGE UP (ref 3.8–10.5)

## 2024-09-07 PROCEDURE — 99284 EMERGENCY DEPT VISIT MOD MDM: CPT

## 2024-09-07 PROCEDURE — 93010 ELECTROCARDIOGRAM REPORT: CPT

## 2024-09-07 RX ORDER — MAGNESIUM, ALUMINUM HYDROXIDE 200-225/5
30 SUSPENSION, ORAL (FINAL DOSE FORM) ORAL ONCE
Refills: 0 | Status: COMPLETED | OUTPATIENT
Start: 2024-09-07 | End: 2024-09-07

## 2024-09-07 RX ORDER — CYCLOBENZAPRINE HCL 10 MG
10 TABLET ORAL ONCE
Refills: 0 | Status: COMPLETED | OUTPATIENT
Start: 2024-09-07 | End: 2024-09-07

## 2024-09-07 RX ORDER — DIPHENHYDRAMINE HCL 50 MG
25 CAPSULE ORAL ONCE
Refills: 0 | Status: COMPLETED | OUTPATIENT
Start: 2024-09-07 | End: 2024-09-07

## 2024-09-07 RX ORDER — FAMOTIDINE 10 MG/ML
20 INJECTION INTRAVENOUS ONCE
Refills: 0 | Status: COMPLETED | OUTPATIENT
Start: 2024-09-07 | End: 2024-09-07

## 2024-09-07 RX ORDER — KETOROLAC TROMETHAMINE 30 MG/ML
15 INJECTION, SOLUTION INTRAMUSCULAR ONCE
Refills: 0 | Status: DISCONTINUED | OUTPATIENT
Start: 2024-09-07 | End: 2024-09-07

## 2024-09-07 RX ADMIN — Medication 25 MILLIGRAM(S): at 22:41

## 2024-09-07 RX ADMIN — Medication 30 MILLILITER(S): at 22:27

## 2024-09-07 RX ADMIN — FAMOTIDINE 20 MILLIGRAM(S): 10 INJECTION INTRAVENOUS at 22:27

## 2024-09-07 RX ADMIN — Medication 10 MILLIGRAM(S): at 22:41

## 2024-09-07 RX ADMIN — KETOROLAC TROMETHAMINE 15 MILLIGRAM(S): 30 INJECTION, SOLUTION INTRAMUSCULAR at 22:41

## 2024-09-07 NOTE — ED PROVIDER NOTE - ATTENDING CONTRIBUTION TO CARE
Agree with above- pt p/w abd pain similar to multiple prior visits, plan for basic labs, UA reflex to Ucx, pain management reassess

## 2024-09-07 NOTE — ED PROVIDER NOTE - PHYSICAL EXAMINATION
CONSTITUTIONAL: awake; in no acute distress.   SKIN: warm, dry  HEAD: Normocephalic; atraumatic.  EYES: no conjunctival injection. no scleral icterus  ENT: No nasal discharge; airway clear.  CARD: S1, S2 normal; no murmurs, gallops, or rubs. Regular rate and rhythm.   RESP: No wheezes, rales or rhonchi. Good air movement bilaterally.   ABD: soft diffusely mild ttp without rebound, no guarding, no distention, no rigidity. no cva tenderness  EXT: Ambulates independently.  No cyanosis or edema.   NEURO: Alert, oriented, grossly unremarkable  PSYCH: Cooperative, appropriate.

## 2024-09-07 NOTE — ED PROVIDER NOTE - OBJECTIVE STATEMENT
Patient is a 39-year-old female past medical history factor V Leiden, DVT/PE on Eliquis, IBS, GERD, PID, endometriosis, asthma, hypothyroidism, personality disorder, explosive/dysregulation disorder, mild intellectual disability, bipolar disorder presenting for abdominal pain.  Patient states she has had bilateral lower quadrant abdominal pain with associated lower back pain intermittently for several weeks.  Close in the setting of recent appendectomy.  Feels that it is in the same locations as it has been as multiple previous hospital visits but the quality is slightly different, whereas previously it was crampy and bubbly and now is more dull and sharp.  Associated urinary frequency without dysuria or hematuria.  Has had a few episodes of loose stools without blood.  No fever, chills, chest pain, cough, sore throat.  No vomiting.  Says that her pain is typically responsive to Toradol and Benadryl, remarks that her Toradol allergy she has previously tolerated well without symptoms when given Benadryl.  Also uses cyclobenzaprine for the back pain.  Has a GI doctor, recently had colonoscopy endoscopy a few months ago that were unremarkable.

## 2024-09-07 NOTE — ED PROVIDER NOTE - PATIENT PORTAL LINK FT
You can access the FollowMyHealth Patient Portal offered by [facility] by registering at the following website: [email or web address]. By joining [facility]’s FollowMyHealth portal, you will also be able to view your health information using other applications (apps) compatible with our system.

## 2024-09-07 NOTE — ED PROVIDER NOTE - PROGRESS NOTE DETAILS
Pierre Saba MD PGY3: plt downtrending from prior, previously noted on chart review with pt to follow hematology outpt. no s/s active bleeding. otherwise labs nonactionable, urine nonactioanble. Discussed with patient all results including any incidentals. Discussed discharge, follow up, return precautions, medications. Patient verbalizes understanding and is amenable at this time. Answered patient questions. Pt to require taxi home. Pierre Saba MD PGY3: pt feels improved and tolerates po.

## 2024-09-07 NOTE — ED ADULT TRIAGE NOTE - CHIEF COMPLAINT QUOTE
Pt arrives to Ed from home c/o abd pain since 16:00 to lower right and left quadrants radiating to her groin.  Hx: GERD, HLD, hypothyroidism, endometriosis, cardiomegaly, epilepsy, factor B blood clotting disorder, DVT, embolism, on Eliquis

## 2024-09-07 NOTE — ED PROVIDER NOTE - NSFOLLOWUPINSTRUCTIONS_ED_ALL_ED_FT
You were seen in the ED for abdominal pain    Your evaluation including blood tests and urine tests showed no findings requiring further evaluation or treatment in the hospital at this time.    Please follow up with your PCP as discussed within 1 week. Discuss your symptoms, medications, and results in this packet. Please follow up with hematology for your low platelet count, see contact information included to schedule an appointment if you do not have one.    Seek immediate medical attention if you experience new or worsening symptoms, including suddenly or severely worsening abd pain, inability to pass gas/stool, inability to drink and eat by mouth, pain with urination.

## 2024-09-07 NOTE — ED PROVIDER NOTE - ED STEMI HIDDEN
The skin of the bilateral groins was clipped, prepped and draped in the usual sterile manner. (If not otherwise specified, skin prep was bilateral.)  hide

## 2024-09-07 NOTE — ED PROVIDER NOTE - CLINICAL SUMMARY MEDICAL DECISION MAKING FREE TEXT BOX
Patient is a 39-year-old female past medical history factor V Leiden, DVT/PE on Eliquis, IBS, GERD, PID, endometriosis, asthma, hypothyroidism, personality disorder, explosive/dysregulation disorder, mild intellectual disability, bipolar disorder presenting for abdominal pain. With continued abdominal pain slightly changed in quality but not in location similar to prior abdominal pain visits to the emergency department multiple times over the past several weeks, with four CT abdomen pelvis during this time with omental changes but no surgical abdomen or other acute findings on chart review.  Without other infectious findings or complaints.  May be exacerbation of IBS versus gastritis, low likelihood of acute abdomen at this time given prior workup, versus urinary etiology.  To screen labs, evaluate urine, give meds and reassess.  If unremarkable will discharge with gastroenterology follow-up.

## 2024-09-07 NOTE — ED PROVIDER NOTE - IV ALTEPLASE INCLUSION HIDDEN
Please call the patient regarding his Coronavirus testing-normal- I evaluated this patient while on-call this week- he can discontinue quarantine as February the 18 show

## 2024-09-07 NOTE — ED ADULT NURSE NOTE - OBJECTIVE STATEMENT
Break RN: 40yo female received in room 9. pt A&Ox4, hx of DVT/PE on Eliquis, IBS, GERD, PID, personality disorder, mild ID, bipoar, c/o abdominal pain since 4pm this afternoon with loose stool, states she has been having abdominal pain for a few weeks but it feels different today. Also states that her pain can be usually controlled by Toradol with Benadryl(for the itchiness). Denies urinary symptoms. Requesting flexeril for her  chronic back pain. 20G IV placed to [facility], lab drawn and sent. Side rails up, bed at lowest position, call bell within reach, patient oriented to the unit, safety maintained.

## 2024-09-08 ENCOUNTER — EMERGENCY (EMERGENCY)
Facility: HOSPITAL | Age: 39
LOS: 1 days | Discharge: HOME | End: 2024-09-08
Attending: EMERGENCY MEDICINE | Admitting: EMERGENCY MEDICINE
Payer: MEDICARE

## 2024-09-08 VITALS
RESPIRATION RATE: 18 BRPM | OXYGEN SATURATION: 100 % | SYSTOLIC BLOOD PRESSURE: 101 MMHG | DIASTOLIC BLOOD PRESSURE: 60 MMHG | WEIGHT: 293 LBS | HEART RATE: 102 BPM | TEMPERATURE: 98 F | HEIGHT: 68 IN

## 2024-09-08 VITALS
DIASTOLIC BLOOD PRESSURE: 68 MMHG | HEART RATE: 101 BPM | OXYGEN SATURATION: 100 % | TEMPERATURE: 98 F | RESPIRATION RATE: 18 BRPM | SYSTOLIC BLOOD PRESSURE: 102 MMHG

## 2024-09-08 LAB
ALBUMIN SERPL ELPH-MCNC: 4.1 G/DL — SIGNIFICANT CHANGE UP (ref 3.3–5)
ALP SERPL-CCNC: 62 U/L — SIGNIFICANT CHANGE UP (ref 40–120)
ALT FLD-CCNC: 49 U/L — HIGH (ref 4–33)
ANION GAP SERPL CALC-SCNC: 14 MMOL/L — SIGNIFICANT CHANGE UP (ref 7–14)
AST SERPL-CCNC: 32 U/L — SIGNIFICANT CHANGE UP (ref 4–32)
BASOPHILS # BLD AUTO: 0.01 K/UL — SIGNIFICANT CHANGE UP (ref 0–0.2)
BASOPHILS NFR BLD AUTO: 0.2 % — SIGNIFICANT CHANGE UP (ref 0–2)
BILIRUB SERPL-MCNC: 0.2 MG/DL — SIGNIFICANT CHANGE UP (ref 0.2–1.2)
BUN SERPL-MCNC: 13 MG/DL — SIGNIFICANT CHANGE UP (ref 7–23)
CALCIUM SERPL-MCNC: 9.8 MG/DL — SIGNIFICANT CHANGE UP (ref 8.4–10.5)
CHLORIDE SERPL-SCNC: 104 MMOL/L — SIGNIFICANT CHANGE UP (ref 98–107)
CO2 SERPL-SCNC: 23 MMOL/L — SIGNIFICANT CHANGE UP (ref 22–31)
CREAT SERPL-MCNC: 0.88 MG/DL — SIGNIFICANT CHANGE UP (ref 0.5–1.3)
EGFR: 87 ML/MIN/1.73M2 — SIGNIFICANT CHANGE UP
EOSINOPHIL # BLD AUTO: 0.01 K/UL — SIGNIFICANT CHANGE UP (ref 0–0.5)
EOSINOPHIL NFR BLD AUTO: 0.2 % — SIGNIFICANT CHANGE UP (ref 0–6)
GLUCOSE SERPL-MCNC: 128 MG/DL — HIGH (ref 70–99)
HCG SERPL-ACNC: <1 MIU/ML — SIGNIFICANT CHANGE UP
HCT VFR BLD CALC: 35 % — SIGNIFICANT CHANGE UP (ref 34.5–45)
HGB BLD-MCNC: 11.5 G/DL — SIGNIFICANT CHANGE UP (ref 11.5–15.5)
IANC: 3.04 K/UL — SIGNIFICANT CHANGE UP (ref 1.8–7.4)
IMM GRANULOCYTES NFR BLD AUTO: 0.6 % — SIGNIFICANT CHANGE UP (ref 0–0.9)
LIDOCAIN IGE QN: 30 U/L — SIGNIFICANT CHANGE UP (ref 7–60)
LYMPHOCYTES # BLD AUTO: 1.42 K/UL — SIGNIFICANT CHANGE UP (ref 1–3.3)
LYMPHOCYTES # BLD AUTO: 28.6 % — SIGNIFICANT CHANGE UP (ref 13–44)
MCHC RBC-ENTMCNC: 31.7 PG — SIGNIFICANT CHANGE UP (ref 27–34)
MCHC RBC-ENTMCNC: 32.9 GM/DL — SIGNIFICANT CHANGE UP (ref 32–36)
MCV RBC AUTO: 96.4 FL — SIGNIFICANT CHANGE UP (ref 80–100)
MONOCYTES # BLD AUTO: 0.46 K/UL — SIGNIFICANT CHANGE UP (ref 0–0.9)
MONOCYTES NFR BLD AUTO: 9.3 % — SIGNIFICANT CHANGE UP (ref 2–14)
NEUTROPHILS # BLD AUTO: 3.04 K/UL — SIGNIFICANT CHANGE UP (ref 1.8–7.4)
NEUTROPHILS NFR BLD AUTO: 61.1 % — SIGNIFICANT CHANGE UP (ref 43–77)
NRBC # BLD: 0 /100 WBCS — SIGNIFICANT CHANGE UP (ref 0–0)
NRBC # FLD: 0 K/UL — SIGNIFICANT CHANGE UP (ref 0–0)
PLATELET # BLD AUTO: 94 K/UL — LOW (ref 150–400)
POTASSIUM SERPL-MCNC: 4.8 MMOL/L — SIGNIFICANT CHANGE UP (ref 3.5–5.3)
POTASSIUM SERPL-SCNC: 4.8 MMOL/L — SIGNIFICANT CHANGE UP (ref 3.5–5.3)
PROT SERPL-MCNC: 7 G/DL — SIGNIFICANT CHANGE UP (ref 6–8.3)
RBC # BLD: 3.63 M/UL — LOW (ref 3.8–5.2)
RBC # FLD: 14.9 % — HIGH (ref 10.3–14.5)
SODIUM SERPL-SCNC: 141 MMOL/L — SIGNIFICANT CHANGE UP (ref 135–145)
WBC # BLD: 4.97 K/UL — SIGNIFICANT CHANGE UP (ref 3.8–10.5)
WBC # FLD AUTO: 4.97 K/UL — SIGNIFICANT CHANGE UP (ref 3.8–10.5)

## 2024-09-08 PROCEDURE — 93010 ELECTROCARDIOGRAM REPORT: CPT

## 2024-09-08 PROCEDURE — 99284 EMERGENCY DEPT VISIT MOD MDM: CPT

## 2024-09-08 RX ORDER — DIPHENHYDRAMINE HCL 50 MG
25 CAPSULE ORAL ONCE
Refills: 0 | Status: COMPLETED | OUTPATIENT
Start: 2024-09-08 | End: 2024-09-08

## 2024-09-08 RX ORDER — CYCLOBENZAPRINE HCL 10 MG
10 TABLET ORAL ONCE
Refills: 0 | Status: COMPLETED | OUTPATIENT
Start: 2024-09-08 | End: 2024-09-08

## 2024-09-08 RX ORDER — MAGNESIUM, ALUMINUM HYDROXIDE 200-225/5
30 SUSPENSION, ORAL (FINAL DOSE FORM) ORAL EVERY 4 HOURS
Refills: 0 | Status: ACTIVE | OUTPATIENT
Start: 2024-09-08 | End: 2025-08-07

## 2024-09-08 RX ORDER — KETOROLAC TROMETHAMINE 30 MG/ML
15 INJECTION, SOLUTION INTRAMUSCULAR ONCE
Refills: 0 | Status: DISCONTINUED | OUTPATIENT
Start: 2024-09-08 | End: 2024-09-08

## 2024-09-08 RX ORDER — LIDOCAINE/BENZALKONIUM/ALCOHOL
1 SOLUTION, NON-ORAL TOPICAL ONCE
Refills: 0 | Status: COMPLETED | OUTPATIENT
Start: 2024-09-08 | End: 2024-09-08

## 2024-09-08 RX ORDER — FAMOTIDINE 10 MG/ML
20 INJECTION INTRAVENOUS ONCE
Refills: 0 | Status: COMPLETED | OUTPATIENT
Start: 2024-09-08 | End: 2024-09-08

## 2024-09-08 RX ADMIN — Medication 25 MILLIGRAM(S): at 21:57

## 2024-09-08 RX ADMIN — KETOROLAC TROMETHAMINE 15 MILLIGRAM(S): 30 INJECTION, SOLUTION INTRAMUSCULAR at 21:57

## 2024-09-08 RX ADMIN — Medication 10 MILLIGRAM(S): at 21:57

## 2024-09-08 RX ADMIN — Medication 1 PATCH: at 22:00

## 2024-09-08 RX ADMIN — FAMOTIDINE 20 MILLIGRAM(S): 10 INJECTION INTRAVENOUS at 21:57

## 2024-09-08 NOTE — ED PROVIDER NOTE - WET READ LAUNCH FT
Detail Level: Detailed Add 44407 Cpt? (Important Note: In 2017 The Use Of 90411 Is Being Tracked By Cms To Determine Future Global Period Reimbursement For Global Periods): no Wound Evaluated By: Shaq Torre MD There are no Wet Read(s) to document.

## 2024-09-08 NOTE — ED ADULT TRIAGE NOTE - CHIEF COMPLAINT QUOTE
arrives from [facility]: dizziness, nausea and generalized abd pain since 1700- LMP July (hx abn periods)- hx endometriosis, seizures, factor 5

## 2024-09-08 NOTE — PROVIDER CONTACT NOTE (OTHER) - ASSESSMENT
Writer called the group home, [facility] ([phone]) and spoke with Yamileth who reported the patient should travel back via Taxi. Address verified as [address] [place] [address] [address].    Writer spoke with Faye and arranged [facility] maoi [phone], P/U 2AM. [facility] auth # [id number].

## 2024-09-08 NOTE — ED ADULT NURSE REASSESSMENT NOTE - NS ED NURSE REASSESS COMMENT FT1
Per ED Provider, Pt cleared to be d/c home. Peripheral IV discontinued at this time. A/Ox4 ambulated w/ steady gait. Vital signs stable.
Pt walked over to ED waiting area. Pending pickup from [employer] cab service arranged by ED SW. Charge nurse made aware.
Pt received from BREAK RN on stretcher, A/Ox4 answers all questions appropriately. Pt denies chest pain and SOB during reassessment, breathing is even and unlabored on room air. Abdomen is soft and non-distended. Pt ambulates independently at baseline, moves all four extremities on command, skin is intact. Pt resting comfortably at the bedside, No apparent acute visible distress noted on reassessment. Vital signs stable, NKA to medications. Pending MD re-eval

## 2024-09-08 NOTE — ED ADULT NURSE NOTE - OBJECTIVE STATEMENT
Received pt in room 7, pt is A&Ox4 with past medical history of  factor V Leiden, DVT PE on Eliquis, IBS, GERD, PID, endometriosis, asthma, hypothyroidism, personality disorder, mild intellectual disability. Pt is from a Group home and came to the ED due to having abdominal pain and lower back pain since yesterday. Pt came to the ED yesterday for the same reasons and states the medication she was given then helped her. Pt breathing is equal and nonlabored. Pt abdomen is soft and nondistended but pt has some pain upon palpitation. Pt denies any SOB, chest pain, headache, nausea, vomiting, diarrhea, fever or chills. Pt has a 20g to the right AC. pt safety maintained.

## 2024-09-08 NOTE — ED PROVIDER NOTE - PHYSICAL EXAMINATION
see mdm see mdm    ATTENDING PHYSICAL EXAM  GEN - NAD; well appearing; A+O x3  HEAD - NC/AT; EYES/NOSE - PERRL, EOMI, mucous membranes moist, no discharge; THROAT: Oral cavity and pharynx normal. No inflammation, swelling, exudate, or lesions  NECK: Neck supple, non-tender without lymphadenopathy, no masses, no JVD  PULMONARY - CTA b/l, symmetric breath sounds, no w/r/r  CARDIAC -s1s2, RRR, no M,R,G  ABDOMEN - +NABS, obese, NT, soft, no guarding, no rebound, no masses   BACK - no CVA tenderness, No vertebral or paravertebral tenderness  EXTREMITIES - symmetric pulses, 2+ dp, capillary refill < 2 seconds, no clubbing, no cyanosis, no edema

## 2024-09-08 NOTE — ED PROVIDER NOTE - PROGRESS NOTE DETAILS
Johnnie Sloan, PGY3 - patient reassessed. Pain much better. will dc with social work. Patient verbalized understanding and agrees with the plan.

## 2024-09-08 NOTE — ED PROVIDER NOTE - NSFOLLOWUPINSTRUCTIONS_ED_ALL_ED_FT
YOU WERE SEEN FOR abdominal pain    YOU HAD blood work done.   You can take ibuprofen 400mg every 6 to 8 hours and Tylenol 1000mg every 6 to 8 hours as needed for pain.     FOLLOW UP WITH YOUR PRIMARY CARE PROVIDER    RETURN TO THE EMERGENCY DEPARTMENT FOR worsening pain, vomiting, fever, or any new/concerning symptoms.

## 2024-09-08 NOTE — ED ADULT NURSE NOTE - AVIAN FLU SYMPTOMS
SUBJECTIVE:   Marya Rodriguez is a 22 year old female who presents to clinic today for the following health issues:      HPI     Diarrhea  Onset: x 1 month    Description:   Consistency of stool: watery  Blood in stool: YES- small amount  Number of loose stools in past 24 hours: 1    Progression of Symptoms:  Worsening- in the past 2 days otherwise staying the same    Accompanying Signs & Symptoms:  Fever: no   Nausea or vomiting; YES- just the last 2 days  Abdominal pain: YES- cramping  Episodes of constipation: YES  Weight loss: no     History:   Ill contacts: no   Recent use of antibiotics: no    Recent travels: no          Recent medication-new or changes(Rx or OTC): no   Therapies Tried and outcome:  None      Problem list and histories reviewed & adjusted, as indicated.  Additional history: as documented        Patient Active Problem List   Diagnosis     Intermittent asthma     Allergic rhinitis due to other allergen     Tension headache     ADHD (attention deficit hyperactivity disorder)     Anxiety     Diarrhea, unspecified type     Past Surgical History:   Procedure Laterality Date     ESOPHAGOSCOPY, GASTROSCOPY, DUODENOSCOPY (EGD), COMBINED  6/26/2013    Procedure: COMBINED ESOPHAGOSCOPY, GASTROSCOPY, DUODENOSCOPY (EGD), BIOPSY SINGLE OR MULTIPLE;  EGD, abdominal pain;  Surgeon: Gera Trejo MD;  Location:  OR       Social History     Tobacco Use     Smoking status: Never Smoker     Smokeless tobacco: Never Used     Tobacco comment: no smokers in the household   Substance Use Topics     Alcohol use: No     Family History   Problem Relation Age of Onset     Asthma Maternal Grandmother      Asthma Brother      Diabetes Brother          Current Outpatient Medications   Medication Sig Dispense Refill     albuterol (PROAIR HFA/PROVENTIL HFA/VENTOLIN HFA) 108 (90 Base) MCG/ACT Inhaler Inhale 2 puffs into the lungs every 4 hours as needed (cough and wheezing) 1 Inhaler 2     FISH OIL        melatonin 3 MG  "tablet Take 2 tablets by mouth At Bedtime.       UNABLE TO FIND MEDICATION NAME: Primrose oil       Allergies   Allergen Reactions     No Known Allergies      Seasonal Allergies      Recent Labs   Lab Test 08/07/13  1011 06/03/13  1453 12/14/12  1409 07/05/12  1544   A1C 5.3  --   --   --    LDL 93  --   --   --    HDL 41*  --   --   --    TRIG 105  --   --   --    ALT  --  24 23 7   CR  --  0.56 0.70 0.62   GFRESTIMATED  --  GFR not calculated, patient <16 years old. GFR not calculated, patient <16 years old. GFR not calculated, patient <16 years old.   GFRESTBLACK  --  GFR not calculated, patient <16 years old. GFR not calculated, patient <16 years old. GFR not calculated, patient <16 years old.   POTASSIUM  --  4.4 3.8 3.7   TSH  --  2.52  --  1.00      BP Readings from Last 3 Encounters:   02/28/19 112/74   07/10/18 106/72   12/15/14 110/70 (49 %/ 66 %)*     *BP percentiles are based on the August 2017 AAP Clinical Practice Guideline for girls    Wt Readings from Last 3 Encounters:   02/28/19 85.7 kg (189 lb)   07/10/18 73.5 kg (162 lb)   06/24/15 69.8 kg (153 lb 12.8 oz) (88 %)*     * Growth percentiles are based on CDC (Girls, 2-20 Years) data.                  Labs reviewed in EPIC    ROS:  Constitutional, HEENT, cardiovascular, pulmonary, gi and gu systems are negative, except as otherwise noted.    OBJECTIVE:     /74 (BP Location: Left arm, Patient Position: Chair, Cuff Size: Adult Regular)   Pulse 102   Temp 100.2  F (37.9  C) (Temporal)   Resp 16   Ht 1.674 m (5' 5.9\")   Wt 85.7 kg (189 lb)   LMP 02/04/2019   SpO2 100%   BMI 30.60 kg/m    Body mass index is 30.6 kg/m .   Physical Exam   Constitutional: She appears well-developed and well-nourished.   HENT:   Head: Normocephalic and atraumatic.   Right Ear: External ear normal.   Left Ear: External ear normal.   Nose: Nose normal.   Mouth/Throat: Oropharynx is clear and moist. No oropharyngeal exudate.   Neck: Neck supple.   Cardiovascular: " Normal rate, regular rhythm and normal heart sounds.   Pulmonary/Chest: Effort normal and breath sounds normal.   Abdominal: Soft. Bowel sounds are normal. She exhibits no distension and no mass. There is tenderness. There is no rebound and no guarding. No hernia.   Psychiatric: She has a normal mood and affect. Her behavior is normal. Judgment and thought content normal.         Diagnostic Test Results:  none     ASSESSMENT/PLAN:     Problem List Items Addressed This Visit     Diarrhea, unspecified type - Primary     Diarrhea present for close to a month, watery stools, cramping, no blood , no abx use. The vomitings started yesterday and has sick contacts with similar symptoms.  Has mild fever on exam here.  Symptoms likely consistent with viral gastroenteritis.  Advised hydration with oral rehydration solution, rest, soft diet, stool studies as ordered to rule out infectious pathology.  Will follow results and treat accordingly.  Patient agreeable to the plan.  Discussed home care  Reportable signs and symptoms discussed  RTC if symptoms persist or fail to improve           Relevant Orders    Clostridium difficile Toxin B PCR    Enteric Bacteria and Virus Panel by ARNULFO Stool    Fecal Lactoferrin    Ova and Parasite Exam Routine        Cassie Ferguson MD  [facility]   No

## 2024-09-08 NOTE — ED PROVIDER NOTE - CLINICAL SUMMARY MEDICAL DECISION MAKING FREE TEXT BOX
Johnnie Sloan, PGY3 - This is a 39-year-old female with past medical history of factor V Leiden, DVT PE on Eliquis, IBS, GERD, PID, endometriosis, asthma, hypothyroidism, personality disorder, mild intellectual disability, presenting to the emergency room for continued abdominal pain since yesterday.  Reports that the abdominal pain started gradually yesterday.  Mostly in the epigastric region also low back pain starting yesterday.  No trauma or injury to the area no saddle anesthesia no bowel or bladder incontinence.  No constipation no fever chills.  Was here in the emergency room yesterday.  obtained labs that were unremarkable including lipase and CBC and CMP and hcg and urinalysis.  Patient was discharged after receiving Flexeril Benadryl Pepcid Maalox Toradol with symptom relief.  However the pain has come back again therefore he came to the ER.  Reports that she was not prescribed any prescription medications for this pain.  Vital signs within normal limits.  Physical exam shows well-appearing female not in acute distress.  No acute respiratory distress.  No crackles or wheezing.  There is slight tenderness to palpation in the epigastric region however other areas of abdomen soft and nontender..  Patient is obese.  No low back tenderness to palpation in the midline.  There is paraspinal.  However moving all extremities without difficulty.  Able to speak freely.  Alert and oriented x 4.  Will treat similarly to yesterday with the meds for abdominal pain.  Will workup with CBC CMP and lipase.  Will not obtain urinalysis at this time as patient did not have any UTI yesterday.  Disposition pending labs medications and reassessment. Johnnie Sloan, PGY3 - This is a 39-year-old female with past medical history of factor V Leiden, DVT PE on Eliquis, IBS, GERD, PID, endometriosis, asthma, hypothyroidism, personality disorder, mild intellectual disability, presenting to the emergency room for continued abdominal pain since yesterday.  Reports that the abdominal pain started gradually yesterday.  Mostly in the epigastric region also low back pain starting yesterday.  No trauma or injury to the area no saddle anesthesia no bowel or bladder incontinence.  No constipation no fever chills.  Was here in the emergency room yesterday.  obtained labs that were unremarkable including lipase and CBC and CMP and hcg and urinalysis.  Patient was discharged after receiving Flexeril Benadryl Pepcid Maalox Toradol with symptom relief.  However the pain has come back again therefore he came to the ER.  Reports that she was not prescribed any prescription medications for this pain.  Vital signs within normal limits.  Physical exam shows well-appearing female not in acute distress.  No acute respiratory distress.  No crackles or wheezing.  There is slight tenderness to palpation in the epigastric region however other areas of abdomen soft and nontender..  Patient is obese.  No low back tenderness to palpation in the midline.  There is paraspinal.  However moving all extremities without difficulty.  Able to speak freely.  Alert and oriented x 4.  Will treat similarly to yesterday with the meds for abdominal pain.  Will workup with CBC CMP and lipase.  Will not obtain urinalysis at this time as patient did not have any UTI yesterday.  Disposition pending labs medications and reassessment.  Attending - Agree with above.  I evaluated patient myself. 40 y/o F with PMH as noted.  Previous [facility] records reviewed including multiple recent ED visits.  Patient reports she had abd pain.  On my assessment, she reports that abd pain has resolved and she feels well after ED treatments.

## 2024-09-08 NOTE — ED PROVIDER NOTE - PATIENT PORTAL LINK FT
You can access the FollowMyHealth Patient Portal offered by [facility] by registering at the following website: [email or web address]. By joining [employer]’s FollowMyHealth portal, you will also be able to view your health information using other applications (apps) compatible with our system.

## 2024-09-09 ENCOUNTER — NON-APPOINTMENT (OUTPATIENT)
Age: 39
End: 2024-09-09

## 2024-09-09 ENCOUNTER — EMERGENCY (EMERGENCY)
Facility: HOSPITAL | Age: 39
LOS: 1 days | Discharge: HOME | End: 2024-09-09
Attending: EMERGENCY MEDICINE | Admitting: EMERGENCY MEDICINE
Payer: MEDICARE

## 2024-09-09 VITALS
TEMPERATURE: 98 F | HEIGHT: 68 IN | DIASTOLIC BLOOD PRESSURE: 78 MMHG | OXYGEN SATURATION: 97 % | HEART RATE: 100 BPM | SYSTOLIC BLOOD PRESSURE: 128 MMHG | WEIGHT: 293 LBS | RESPIRATION RATE: 16 BRPM

## 2024-09-09 PROCEDURE — 93010 ELECTROCARDIOGRAM REPORT: CPT

## 2024-09-09 PROCEDURE — 99284 EMERGENCY DEPT VISIT MOD MDM: CPT

## 2024-09-09 RX ORDER — MAGNESIUM, ALUMINUM HYDROXIDE 200-225/5
30 SUSPENSION, ORAL (FINAL DOSE FORM) ORAL ONCE
Refills: 0 | Status: COMPLETED | OUTPATIENT
Start: 2024-09-09 | End: 2024-09-09

## 2024-09-09 RX ORDER — ACETAMINOPHEN 325 MG/1
975 TABLET ORAL ONCE
Refills: 0 | Status: COMPLETED | OUTPATIENT
Start: 2024-09-09 | End: 2024-09-09

## 2024-09-09 RX ORDER — FAMOTIDINE 10 MG/ML
20 INJECTION INTRAVENOUS ONCE
Refills: 0 | Status: COMPLETED | OUTPATIENT
Start: 2024-09-09 | End: 2024-09-09

## 2024-09-09 NOTE — PROVIDER CONTACT NOTE (OTHER) - ASSESSMENT
Writer contacted by the medical team, the patient is medically and socially cleared for d/c.  Writer called the group home, [facility] ([phone]) and spoke with Peter who reported the patient should travel back via Taxi nor ambulance . Address verified as [address] [place] [address] [address]. The patient travel with no supervision and she is independent     Writer spoke with Faye and arranged [employer] at [phone] , P/U 1AM. [facility] auth # [id number].  Status board updated for medical team review. Writer contacted by the medical team, the patient is medically and socially cleared for d/c.  Writer called the group home, [facility] ([phone]) and spoke with Peter who reported the patient should travel back via Taxi nor ambulance . Address verified as [address] [place] [address] [address]. The patient travel with no supervision and she is independent   SWn arranged with  [employer] at [phone] , P/U 1AM. [facility] auth # [id number].  Status board updated for medical team review.

## 2024-09-09 NOTE — ED PROVIDER NOTE - PROGRESS NOTE DETAILS
Pt feeling better. Abd pain has resolved. Abd soft non tender. Pt tolerating PO. Advised to follow up with PCP within the next 1-2 days. If any worsening symptoms return to Emergency Department immediately. Pt verbalizes agreement and understanding. VSS.

## 2024-09-09 NOTE — ED PROVIDER NOTE - ATTENDING APP SHARED VISIT CONTRIBUTION OF CARE
39-year-old female with history of bipolar disorder, PNES, factor V Leiden, DVT on Eliquis, IBS, GERD, mild intellectual delay, hypothyroidism from group home for multiple complaints.  Patient reporting abdominal pain, nausea, back pain, dizziness.  Of note patient with multiple ED visits with similar complaints including last night with a normal workup.  No new symptoms.  She denies any drug or alcohol use.  No fevers no vomiting no diarrhea.    Well appearing, obese, lying comfortably in stretcher, awake and alert, nontoxic.  AF/VSS.  Lungs cta bl.  Cards nl S1/S2, RRR, no MRG.  Abd soft ntnd no rebound or guarding.  No pedal edema or calf tenderness.  No focal neuro deficits, gait steady.    Patient is well-appearing with a nonfocal exam, hemodynamically stable.  She is tolerating p.o. here in the ED and is walking around without any difficulty.  She has no acute neurologic deficits.  Will offer supportive care with p.o. medications, DC back to group home.

## 2024-09-09 NOTE — ED ADULT TRIAGE NOTE - BEFAST EYES
"No

## 2024-09-09 NOTE — ED PROVIDER NOTE - PATIENT PORTAL LINK FT
You can access the FollowMyHealth Patient Portal offered by [facility] by registering at the following website: [email or web address]. By joining [employer]’s FollowMyHealth portal, you will also be able to view your health information using other applications (apps) compatible with our system.

## 2024-09-09 NOTE — ED PROVIDER NOTE - CLINICAL SUMMARY MEDICAL DECISION MAKING FREE TEXT BOX
39-year-old female with past medical history of factor V Leiden, DVT PE on Eliquis, IBS, GERD, PID, endometriosis, asthma, hypothyroidism, personality disorder, mild intellectual disability, presenting to the emergency room for continued abdominal pain. Was seen in ED yesterday for similar pain. Labs for yesterday normal. Pt has been seen multiple times in ED for similar abd pain. Pt denies fever, chills, chest pain, sob, cough, n/v/d, headache, dizziness. No urinary symptoms. no vaginal bleeding or discharge.    abdomen soft non tender  no indication for imaging at this time     plan  - pain control  - gi cocktail   - PO challenge

## 2024-09-09 NOTE — ED ADULT TRIAGE NOTE - CHIEF COMPLAINT QUOTE
Pt coming from group home c/o intermittent dizziness (worsens with movement), abdominal pain and nausea after receiving her evening medications of  benadryl melatonin and muscle relaxer. History of GERD, HLD, schizophrenia, bipolar, asthma, No neuro deficits noted. Ambulates with steady gait.

## 2024-09-09 NOTE — ED PROVIDER NOTE - OBJECTIVE STATEMENT
39-year-old female with past medical history of factor V Leiden, DVT PE on Eliquis, IBS, GERD, PID, endometriosis, asthma, hypothyroidism, personality disorder, mild intellectual disability, presenting to the emergency room for continued abdominal pain. Was seen in ED yesterday for similar pain. Labs for yesterday normal. Pt has been seen multiple times in ED for similar abd pain. Pt denies fever, chills, chest pain, sob, cough, n/v/d, headache, dizziness. No urinary symptoms. no vaginal bleeding or discharge.

## 2024-09-09 NOTE — ED ADULT TRIAGE NOTE - MEANS OF ARRIVAL
Per Epic, MD last saw pt on 6/20/18 and refilled medications. Order for duloxetine 30mg was e-prescribed for 30 day supply with 4 refills to [facility] pharmacy and confirmed as received.     Refill request denied, not due for renewal at this time.    ambulatory

## 2024-09-10 RX ORDER — KETOROLAC TROMETHAMINE 30 MG/ML
30 INJECTION, SOLUTION INTRAMUSCULAR ONCE
Refills: 0 | Status: DISCONTINUED | OUTPATIENT
Start: 2024-09-10 | End: 2024-09-10

## 2024-09-10 RX ORDER — DIPHENHYDRAMINE HCL 50 MG
50 CAPSULE ORAL ONCE
Refills: 0 | Status: COMPLETED | OUTPATIENT
Start: 2024-09-10 | End: 2024-09-10

## 2024-09-10 RX ADMIN — Medication 50 MILLIGRAM(S): at 00:40

## 2024-09-10 RX ADMIN — Medication 30 MILLILITER(S): at 00:05

## 2024-09-10 RX ADMIN — FAMOTIDINE 20 MILLIGRAM(S): 10 INJECTION INTRAVENOUS at 00:06

## 2024-09-10 RX ADMIN — KETOROLAC TROMETHAMINE 30 MILLIGRAM(S): 30 INJECTION, SOLUTION INTRAMUSCULAR at 00:40

## 2024-09-10 NOTE — PROVIDER CONTACT NOTE (OTHER) - ACTION/TREATMENT ORDERED:
Arranged a taxi via [employer]; assigned to [employer]; invoice # [id number]; [phone]. Arranged a taxi via [facility]; assigned to [employer]; invoice # [id number]; [phone].  Update-[facility] does not have a  available-rescheduled with [employer] taxi.

## 2024-09-10 NOTE — ED ADULT NURSE NOTE - OBJECTIVE STATEMENT
Pt received in 24A, aaox3, ambulatory, breathing even and unlabored in bed. Pt came in with c/o diffuse abd pain and back pain. Pt was seen here yesterday for the same complaints. Pt requesting Toradol and benadryl and stating she is not actually allergic to toradol she becomes "slightly itchy and would like the medicine". As per MD Hart she spoke with pt, ok for her to get the toradol. Pt denies chest pain, SOB, dizziness, headache, blurry vision, chills. Bed in lowest position, call bell within reach.

## 2024-09-10 NOTE — ED ADULT NURSE NOTE - CAS EDP DISCH TYPE
Patient calls office stating he has CPE scheduled with Dr. Lam 4/14/17, but is in need of RF of depakote and finasteride prior to appt.  RF sent per protocol (enough to get to appt).   
Group Home

## 2024-09-10 NOTE — PROVIDER CONTACT NOTE (OTHER) - ASSESSMENT
Called residence [phone]; discussed pt's discharge and mode of transportation.  Per staff, pt can travel via taxi.  Discussed with provider, who is aware and in agreement with travel back via taxi.

## 2024-09-10 NOTE — ED ADULT NURSE NOTE - NS ED NURSE RECORD ANOTHER HT AND WT
Last CPE: 1/26/22      PC with pt  Pt reports that she is having trouble with sleeping this has been an ongoing concern for her  Pt reports that she has tried melatonin, sleep clinic etc  Pt reports that she isn't feeling anxious or stressed at the time so is unsure why she can't sleep    Msg to MITCH Harry: see above referral to sleep clinic?      No

## 2024-09-12 ENCOUNTER — INPATIENT (INPATIENT)
Facility: HOSPITAL | Age: 39
LOS: 6 days | Discharge: NURSING, REHAB OR LONG-TERM CARE | End: 2024-09-19
Attending: STUDENT IN AN ORGANIZED HEALTH CARE EDUCATION/TRAINING PROGRAM | Admitting: STUDENT IN AN ORGANIZED HEALTH CARE EDUCATION/TRAINING PROGRAM
Payer: MEDICARE

## 2024-09-12 VITALS
HEART RATE: 108 BPM | HEIGHT: 68 IN | RESPIRATION RATE: 20 BRPM | SYSTOLIC BLOOD PRESSURE: 107 MMHG | OXYGEN SATURATION: 98 % | DIASTOLIC BLOOD PRESSURE: 76 MMHG | WEIGHT: 293 LBS | TEMPERATURE: 98 F

## 2024-09-12 DIAGNOSIS — R53.1 WEAKNESS: ICD-10-CM

## 2024-09-12 DIAGNOSIS — Z86.73 PERSONAL HISTORY OF TRANSIENT ISCHEMIC ATTACK (TIA), AND CEREBRAL INFARCTION WITHOUT RESIDUAL DEFICITS: ICD-10-CM

## 2024-09-12 DIAGNOSIS — D68.51 ACTIVATED PROTEIN C RESISTANCE: ICD-10-CM

## 2024-09-12 DIAGNOSIS — R20.2 PARESTHESIA OF SKIN: ICD-10-CM

## 2024-09-12 DIAGNOSIS — Z29.9 ENCOUNTER FOR PROPHYLACTIC MEASURES, UNSPECIFIED: ICD-10-CM

## 2024-09-12 DIAGNOSIS — E03.9 HYPOTHYROIDISM, UNSPECIFIED: ICD-10-CM

## 2024-09-12 LAB
ALBUMIN SERPL ELPH-MCNC: 4.5 G/DL — SIGNIFICANT CHANGE UP (ref 3.3–5)
ALP SERPL-CCNC: 77 U/L — SIGNIFICANT CHANGE UP (ref 40–120)
ALT FLD-CCNC: 47 U/L — HIGH (ref 4–33)
ANION GAP SERPL CALC-SCNC: 17 MMOL/L — HIGH (ref 7–14)
APTT BLD: 29.2 SEC — SIGNIFICANT CHANGE UP (ref 24.5–35.6)
AST SERPL-CCNC: 28 U/L — SIGNIFICANT CHANGE UP (ref 4–32)
BASOPHILS # BLD AUTO: 0.02 K/UL — SIGNIFICANT CHANGE UP (ref 0–0.2)
BASOPHILS NFR BLD AUTO: 0.4 % — SIGNIFICANT CHANGE UP (ref 0–2)
BILIRUB SERPL-MCNC: 0.2 MG/DL — SIGNIFICANT CHANGE UP (ref 0.2–1.2)
BLOOD GAS VENOUS COMPREHENSIVE RESULT: SIGNIFICANT CHANGE UP
BUN SERPL-MCNC: 10 MG/DL — SIGNIFICANT CHANGE UP (ref 7–23)
CALCIUM SERPL-MCNC: 9.7 MG/DL — SIGNIFICANT CHANGE UP (ref 8.4–10.5)
CHLORIDE SERPL-SCNC: 102 MMOL/L — SIGNIFICANT CHANGE UP (ref 98–107)
CO2 SERPL-SCNC: 19 MMOL/L — LOW (ref 22–31)
CREAT SERPL-MCNC: 0.83 MG/DL — SIGNIFICANT CHANGE UP (ref 0.5–1.3)
EGFR: 93 ML/MIN/1.73M2 — SIGNIFICANT CHANGE UP
EOSINOPHIL # BLD AUTO: 0.02 K/UL — SIGNIFICANT CHANGE UP (ref 0–0.5)
EOSINOPHIL NFR BLD AUTO: 0.4 % — SIGNIFICANT CHANGE UP (ref 0–6)
GLUCOSE BLDC GLUCOMTR-MCNC: 107 MG/DL — HIGH (ref 70–99)
GLUCOSE SERPL-MCNC: 128 MG/DL — HIGH (ref 70–99)
HCT VFR BLD CALC: 39.7 % — SIGNIFICANT CHANGE UP (ref 34.5–45)
HGB BLD-MCNC: 13 G/DL — SIGNIFICANT CHANGE UP (ref 11.5–15.5)
IANC: 2.27 K/UL — SIGNIFICANT CHANGE UP (ref 1.8–7.4)
IMM GRANULOCYTES NFR BLD AUTO: 0.6 % — SIGNIFICANT CHANGE UP (ref 0–0.9)
INR BLD: 1.07 RATIO — SIGNIFICANT CHANGE UP (ref 0.85–1.18)
LYMPHOCYTES # BLD AUTO: 2.02 K/UL — SIGNIFICANT CHANGE UP (ref 1–3.3)
LYMPHOCYTES # BLD AUTO: 41.9 % — SIGNIFICANT CHANGE UP (ref 13–44)
MCHC RBC-ENTMCNC: 31.7 PG — SIGNIFICANT CHANGE UP (ref 27–34)
MCHC RBC-ENTMCNC: 32.7 GM/DL — SIGNIFICANT CHANGE UP (ref 32–36)
MCV RBC AUTO: 96.8 FL — SIGNIFICANT CHANGE UP (ref 80–100)
MONOCYTES # BLD AUTO: 0.46 K/UL — SIGNIFICANT CHANGE UP (ref 0–0.9)
MONOCYTES NFR BLD AUTO: 9.5 % — SIGNIFICANT CHANGE UP (ref 2–14)
NEUTROPHILS # BLD AUTO: 2.27 K/UL — SIGNIFICANT CHANGE UP (ref 1.8–7.4)
NEUTROPHILS NFR BLD AUTO: 47.2 % — SIGNIFICANT CHANGE UP (ref 43–77)
NRBC # BLD: 0 /100 WBCS — SIGNIFICANT CHANGE UP (ref 0–0)
NRBC # FLD: 0 K/UL — SIGNIFICANT CHANGE UP (ref 0–0)
PLATELET # BLD AUTO: 107 K/UL — LOW (ref 150–400)
POTASSIUM SERPL-MCNC: 4.8 MMOL/L — SIGNIFICANT CHANGE UP (ref 3.5–5.3)
POTASSIUM SERPL-SCNC: 4.8 MMOL/L — SIGNIFICANT CHANGE UP (ref 3.5–5.3)
PROT SERPL-MCNC: 8.1 G/DL — SIGNIFICANT CHANGE UP (ref 6–8.3)
PROTHROM AB SERPL-ACNC: 12.1 SEC — SIGNIFICANT CHANGE UP (ref 9.5–13)
RBC # BLD: 4.1 M/UL — SIGNIFICANT CHANGE UP (ref 3.8–5.2)
RBC # FLD: 14.6 % — HIGH (ref 10.3–14.5)
SODIUM SERPL-SCNC: 138 MMOL/L — SIGNIFICANT CHANGE UP (ref 135–145)
TROPONIN T, HIGH SENSITIVITY RESULT: <6 NG/L — SIGNIFICANT CHANGE UP
VALPROATE SERPL-MCNC: 102.7 UG/ML — HIGH (ref 50–100)
WBC # BLD: 4.82 K/UL — SIGNIFICANT CHANGE UP (ref 3.8–10.5)
WBC # FLD AUTO: 4.82 K/UL — SIGNIFICANT CHANGE UP (ref 3.8–10.5)

## 2024-09-12 PROCEDURE — 99223 1ST HOSP IP/OBS HIGH 75: CPT

## 2024-09-12 PROCEDURE — 99291 CRITICAL CARE FIRST HOUR: CPT

## 2024-09-12 PROCEDURE — 70551 MRI BRAIN STEM W/O DYE: CPT | Mod: 26

## 2024-09-12 PROCEDURE — 70496 CT ANGIOGRAPHY HEAD: CPT | Mod: 26,MC

## 2024-09-12 PROCEDURE — 70498 CT ANGIOGRAPHY NECK: CPT | Mod: 26,MC

## 2024-09-12 PROCEDURE — 70450 CT HEAD/BRAIN W/O DYE: CPT | Mod: 26,MC,XU

## 2024-09-12 PROCEDURE — 0042T: CPT | Mod: MC

## 2024-09-12 RX ORDER — RISPERIDONE 0.25 MG/1
1 TABLET, FILM COATED ORAL AT BEDTIME
Refills: 0 | Status: DISCONTINUED | OUTPATIENT
Start: 2024-09-12 | End: 2024-09-13

## 2024-09-12 RX ORDER — RISPERIDONE 0.25 MG/1
2 TABLET, FILM COATED ORAL DAILY
Refills: 0 | Status: DISCONTINUED | OUTPATIENT
Start: 2024-09-12 | End: 2024-09-13

## 2024-09-12 RX ORDER — LEVOTHYROXINE SODIUM 100 MCG
100 TABLET ORAL DAILY
Refills: 0 | Status: DISCONTINUED | OUTPATIENT
Start: 2024-09-12 | End: 2024-09-19

## 2024-09-12 RX ORDER — ACETAMINOPHEN 325 MG/1
650 TABLET ORAL ONCE
Refills: 0 | Status: COMPLETED | OUTPATIENT
Start: 2024-09-12 | End: 2024-09-12

## 2024-09-12 RX ORDER — CYCLOBENZAPRINE HCL 10 MG
5 TABLET ORAL
Refills: 0 | Status: DISCONTINUED | OUTPATIENT
Start: 2024-09-12 | End: 2024-09-19

## 2024-09-12 RX ORDER — DIPHENHYDRAMINE HCL 50 MG
50 CAPSULE ORAL AT BEDTIME
Refills: 0 | Status: DISCONTINUED | OUTPATIENT
Start: 2024-09-12 | End: 2024-09-19

## 2024-09-12 RX ORDER — NALTREXONE HYDROCHLORIDE 50 MG/1
50 TABLET, FILM COATED ORAL DAILY
Refills: 0 | Status: DISCONTINUED | OUTPATIENT
Start: 2024-09-12 | End: 2024-09-19

## 2024-09-12 RX ORDER — LORAZEPAM 4 MG/ML
1 INJECTION INTRAMUSCULAR; INTRAVENOUS ONCE
Refills: 0 | Status: DISCONTINUED | OUTPATIENT
Start: 2024-09-12 | End: 2024-09-12

## 2024-09-12 RX ORDER — LIDOCAINE/BENZALKONIUM/ALCOHOL
1 SOLUTION, NON-ORAL TOPICAL ONCE
Refills: 0 | Status: COMPLETED | OUTPATIENT
Start: 2024-09-12 | End: 2024-09-12

## 2024-09-12 RX ORDER — APIXABAN 5 MG/1
5 TABLET, FILM COATED ORAL
Refills: 0 | Status: DISCONTINUED | OUTPATIENT
Start: 2024-09-12 | End: 2024-09-19

## 2024-09-12 RX ORDER — ESCITALOPRAM OXALATE 10 MG/1
20 TABLET ORAL DAILY
Refills: 0 | Status: DISCONTINUED | OUTPATIENT
Start: 2024-09-12 | End: 2024-09-19

## 2024-09-12 RX ORDER — CLONIDINE HCL 0.2 MG
0.1 TABLET ORAL AT BEDTIME
Refills: 0 | Status: DISCONTINUED | OUTPATIENT
Start: 2024-09-12 | End: 2024-09-19

## 2024-09-12 RX ORDER — DIVALPROEX SODIUM 125 MG/1
750 CAPSULE, DELAYED RELEASE ORAL
Refills: 0 | Status: DISCONTINUED | OUTPATIENT
Start: 2024-09-12 | End: 2024-09-19

## 2024-09-12 RX ADMIN — APIXABAN 5 MILLIGRAM(S): 5 TABLET, FILM COATED ORAL at 20:16

## 2024-09-12 RX ADMIN — LORAZEPAM 1 MILLIGRAM(S): 4 INJECTION INTRAMUSCULAR; INTRAVENOUS at 20:21

## 2024-09-12 NOTE — H&P ADULT - NSHPREVIEWOFSYSTEMS_GEN_ALL_CORE
Review of Systems:   CONSTITUTIONAL: No fever, weight loss  EYES: No eye pain, + visual disturbances  ENMT:  No difficulty hearing, tinnitus, vertigo  RESPIRATORY: No SOB. No cough, wheezing  CARDIOVASCULAR: No chest pain, palpitations, dizziness, + leg swelling  GASTROINTESTINAL: No abdominal or epigastric pain. No nausea, vomiting, or hematemesis; No diarrhea or constipation.   GENITOURINARY: No dysuria  NEUROLOGICAL: + headaches, loss of strength, numbness  SKIN: No itching, burning, rashes, or lesions   MUSCULOSKELETAL: No joint pain or swelling  PSYCHIATRIC: No depression  HEME/LYMPH: No easy bruising, or bleeding gums

## 2024-09-12 NOTE — H&P ADULT - HISTORY OF PRESENT ILLNESS
38 year old female with past medical history of explosive disorder, mild intellectual disability, CVA (in 2018 on Eliquis), factor V Leiden deficiency a/w DVT/PE, hypothyroid, GERD presents to emergency department for evaluation of bilateral lower extremity numbness onset 9 AM this morning. PT has left sided weakness as a residual from previous stroke but this am she had new L lower extrem numbness and tingling. She also admits to pain on the left side. She denies chest pain, sob, nausea, vomiting, diarrhea, constipation, abdominal pain. She admits to headache and vision changes.     Pts medical decision maker: [phone] or [phone]

## 2024-09-12 NOTE — ED ADULT NURSE NOTE - NSFALLHARMRISKINTERV_ED_ALL_ED
Assistance OOB with selected safe patient handling equipment if applicable/Assistance with ambulation/Communicate risk of Fall with Harm to all staff, patient, and family/Monitor gait and stability/Provide visual cue: red socks, yellow wristband, yellow gown, etc/Reinforce activity limits and safety measures with patient and family/Use of alarms - bed, stretcher, chair and/or video monitoring/Bed in lowest position, wheels locked, appropriate side rails in place/Call bell, personal items and telephone in reach/Instruct patient to call for assistance before getting out of bed/chair/stretcher/Non-slip footwear applied when patient is off stretcher/[place] to call system/Physically safe environment - no spills, clutter or unnecessary equipment/Purposeful Proactive Rounding/Room/bathroom lighting operational, light cord in reach

## 2024-09-12 NOTE — H&P ADULT - ASSESSMENT
38 year old female with past medical history of explosive disorder, mild intellectual disability, CVA (in 2018 on Eliquis), factor V Leiden deficiency a/w DVT/PE, hypothyroid, GERD presents to emergency department for evaluation of bilateral lower extremity numbness onset 9 AM this morning.

## 2024-09-12 NOTE — ED PROVIDER NOTE - TOBACCO USE
CC: Diabetes Mellitus, type 2 with diabetic polyneuropathy. Dyslipidemia.     HPI: This is a 65 year old male who is here for follow up visit for above mentioned problems.    Rodrigo has diabetes since 1987. He takes insulin since 2006.      Invokana prescribed but pt never started due to side effect concern    No known complications of diabetes, no retinopathy, nephropathy, PVD, CAD. Has neuropathy which is stable. He sees podiatry Dr. Maribell Salinas for his foot care.    Switched Levemir to Basaglar due to insurance.    His current treatment of Diabetes includes Glipizide 10 mg 2 times daily, Metformin 1000mg 2 times daily and Basaglar 40 units at bed time. Taking regularly and tolerating well.    Recent A1c is    Hemoglobin A1C (%)   Date Value   07/06/2018 7.2 (H)     He still doesn't check blood sugar regularly. He test only if he feels symptoms. He reports his blood sugars run <200's range. He has intermittent hypoglycemia. He corrects by eating a piece of fruit. There has not been 911 or assistance needed in the recent past. He does carry fast acting glucose tablets and DM ID.    No polyuria or polydipsia. No vision difficulty. Last opthalmology visit was 11/17 but we don't have medical record. Weight is stable. Not taking baby ASA.    Has dyslipidemia. Was taking Cholest-off OTC but stopped it. On diet control only.     Component      Latest Ref Rng & Units 2/5/2018   LDL (Direct)      <130 mg/dL 102     No myalgia. No chest pain or SOB.    No history of HTN but his blood pressure is high normal today at 144/82. Currently not taking any ACEI/ARB at this time. No PND or orthopnea. No leg swelling.    Past Medical History:   Diagnosis Date   • Acute pancreatitis    • Bronchitis    • Chronic knee pain left - 01/01/1978    has had knee arthroscopy for cartilage   • Colon polyp    • Erectile dysfunction 3/31/2017   • Gastroesophageal reflux disease    • PONV (postoperative nausea and vomiting)    • Seasonal allergies     • Sinusitis, chronic    • Snoring     startles self awake at night from snoring   • Tubular adenoma 10/08/2008   • Type II or unspecified type diabetes mellitus without mention of complication, not stated as uncontrolled     Diabetes Mellitus     Current Outpatient Prescriptions   Medication Sig   • glipiZIDE (GLUCOTROL) 5 MG tablet Take 2 tablets in AM and 2 tablets in PM   • BASAGLAR KWIKPEN 100 UNIT/ML pen-injector INJECT SUBCUTANEOUSLY 40  UNITS NIGHTLY   • metFORMIN (GLUCOPHAGE) 500 MG tablet TAKE 2 TABLETS BY MOUTH TWO TIMES DAILY WITH MEALS   • ONE TOUCH ULTRA TEST test strip TEST BLOOD SUGAR 3 TIMES  DAILY AS DIRECTED.   • Blood Glucose Monitoring Suppl (ONE TOUCH ULTRA 2) W/DEVICE Kit Use to test blood sugar 4 times daily   • Insulin Pen Needle (NOVOFINE 30) 30G X 8 MM MISC     • Pseudoephedrine-Guaifenesin (MUCINEX D) 120-1200 MG OR TB12 1 TABLET EVERY 12 HOURS   • MULTIVITAMINS PO CAPS once daily   • ONE TOUCH ULTRASOFT LANCETS MISC test 4 times a day     No current facility-administered medications for this visit.       ALLERGIES: no known allergies.  Family History   Problem Relation Age of Onset   • Adopted: Yes   • Cancer Mother         ? TYPE   • ENT Disorder Sister         Vertigo   • * Father         Hx unknown but  age 89   • * Brother         Hx unknown     Social History   Substance Use Topics   • Smoking status: Former Smoker     Quit date: 1973   • Smokeless tobacco: Never Used      Comment: smoked from age [age]   • Alcohol use No       Exam:  VITALS:   Visit Vitals  /82   Pulse 64   Wt 103.7 kg Comment: with shoes   BMI 30.16 kg/m²     GENERAL: Well built and well nourished. No apparent distress.  PSY: Mood and affect are appropriate. AO X 3  SKIN: No rash, normal temperature, texture, turgor  EYES: EOMI, conjunctiva non icteric, no lid lag or proptosis  NECK:Has acanthosis, no thyromegaly or other neck mass  CVS: S1, S2 normal, no murmur, rub or gallop.   RS: Normal  breathing effort, AEBE, no wheezing or crackles  ABD: Soft, nontender, nondistended.  NEURO: No tremor. DTRs 1+, symmetrical.                             MUS-SKEL: No clubbing, cyanosis. No ulceration or deformities of feet.   EXT: No calluses. No edema.    Diabetic Foot Exam Documentation     Normal Bilateral Foot Exam: Skin integrity is normal. Dorsalis pedis and posterior tibial pulses are present.  Pressure sensation using the [place] monofilament is present.    Labs:  Hemoglobin A1C (%)   Date Value   07/06/2018 7.2 (H)     Potassium (mmol/L)   Date Value   07/06/2018 4.0     Creatinine (mg/dL)   Date Value   07/06/2018 0.99     MICROALBUMIN/CREAT (mcg/mg)   Date Value   04/19/2012 5.4     MICROALBUMIN/CREATININE (mcg/mg)   Date Value   02/05/2018     UNABLE TO CALCULATE DUE TO LOW ANALYTE CONCENTRATION.     CHOLESTEROL (mg/dL)   Date Value   03/24/2017 169      HDL (mg/dL)   Date Value   03/24/2017 44 (L)     TRIGLYCERIDE (mg/dL)   Date Value   03/24/2017 101     CALCULATED LDL (mg/dL)   Date Value   03/24/2017 105     A/P:  1. Diabetes, type 2, fair control.  --Recent A1c is 7.2  --He test only if he feels symptoms.   --He reports his blood sugars run <200's range.   --Continue Glipizide to 10 mg BID  --Continue Basaglar 40 units HS   --Continue Metformin 1000 mg, twice daily  --Advise to check blood sugars at least 2 times daily with rotation.  --Check A1c and microalbumin, 1 week prior to next f/u.    2. Dyslipidemia.   --Continue diet control.   --Check FLPr, 1 week prior to next f/u.    3. Diabetic polyneuropathy   --Stable      Follow-up: 5 months     On 7/12/2018, IAnn scribed the services personally performed by Nikolai Archuleta MD    I personally examined the patient, reviewed the clinical data, labs, images. I reviewed and edited the above note as needed. I personally formulated the impressions and recommendations.     Nikloai Archuleta MD              Never smoker

## 2024-09-12 NOTE — ED PROVIDER NOTE - RAPID ASSESSMENT
COMPLEX CARE NOTE: on lifelong disability, domiciled in [facility] adult home, has a court-appointed legal guardian; lifelong Affective Dysregulation/Intermittent Explosive Disorder, Personality Disorder, Mild Intellectual disability, hx of intrauterine exposure to cocaine, hx of remote Cocaine use disorder, past psychiatric admissions, 2 prior suicidal gestures/self-injurious behavior, + legal history, EXTENSIVE amount of ED visits, many medical admissions (, [facility], [facility], other health system hospitals);  aggressive, threatening, indimidating to peers/staff  PMH: Factor V Leiden Mutation dx in 2016 with multiple DVTs/PE; psychogenic nonepileptic seizures, vitamin D deficiency, hx of DVT and PE, chronic ischemic heart disease, IBS, GERD, PID, endometriosis, headache syndrome, asthma, hx of shoulder abscess with drainage; hypothyroidism. Recent EEGs: psychogenic nonepileptic attacks or functional seizures. Mild diffuse cerebral dysfunction, No epileptiform pattern or seizure observed. MRI head 3/2/22: few scattered nonspc foci of abnormal signal in periventricular/subcortical white matter. DONOVAN 8/22/22: normal study   MEDS: trileptal 150mg bid and  Ativan 0.5mg BID PRN for anxiety; trileptal 150mg PO BID; Eliquis 2.5mg PO BID or 5mg PO bid; Thorazine 50mg QID (7a, 11a, 4pm, 8pm); Seroquel 200mg 8am, 8pm, and 50mg 12 noon; Levothyroxine 75mg daily  ISSUE 1: recurrent admission for “seizures” despite consistently negative recent Neurological work-up and documented “pseudoseizures” ; history of feigning seizure symptoms/signs in order to get medical admission and not be sent back to her residence.  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 ISSUE 3: when admitted, Code Greys, throwing objects, aggression/yelling/curing; posturing/physical intimidation when needs not met

## 2024-09-12 NOTE — ED PROVIDER NOTE - PHYSICAL EXAMINATION
Gen: well appearing, in no acute distress   Head: normal appearing  HEENT: normal conjunctiva, oral mucosa moist, vision grossly intact, no nystagmus, eye movements slow   Lung: no respiratory distress, speaking in full sentences, CTA b/l, no wheeze, crackles or rhonchi   CV: regular rate and rhythm, no murmurs  Abd: soft, non distended, non tender   MSK: no visible deformities, LUE 4/5 weakness (baseline), 0/5 strength in LLE, and 3/5 strength in RLE, no pronator drift and speech is clear, normal sensation over face and UE LUL, decreased sensation to light touch over L lower leg   Neuro: No focal deficits, AAOx3  Skin: Warm, no rashes   Psych: normal affect

## 2024-09-12 NOTE — CONSULT NOTE ADULT - TIME BILLING
39-year-old right-handed lady evaluated at [facility] on 9/13/2024 with numbness and weakness of all 4 extremities.  History and exam as above, with minor changes.  ROS otherwise negative.  Exam.  Apparent right homonymous hemianopia (able to count fingers but with hesitancy); 4 limb weakness as above, but in addition currently essentially areflexic and plantar reflexes are flexor; temperature sensation decreased on left leg.  CT head/CTA neck and head (9/12/2024) to my eye were unremarkable.  MRI brain (9/13/2024) to my eye showed a couple of nonspecific punctate foci of hyperintensity on FLAIR in the subcortical white matter of the bilateral cerebral hemispheres and was otherwise unremarkable.    Impression.  History of DVT/PE, factor V Leiden mutation and has been taking Eliquis.  She presented with numbness and tingling of her distal left leg as her chief complaint, but then acknowledged that she had weakness of at least 3 extremities except her right arm (which appeared to show some weakness on exam, however).  Her presentation may be consistent with cervical spinal cord dysfunction, etiology uncertain, but compressive lesions including hemorrhage, cervical spondylosis, and perhaps less likely cord infarction or inflammation should be screened for.  Her sensory deficit to temperature is ipsilateral to her more severe weakness, so she does not have a Brown-Séquard syndrome, perhaps making spinal cord dysfunction less likely.  Her virtual areflexia and flexor plantar reflexes may suggest a peripheral nervous system process such as AIDP, although there was no clear prior infection and this diagnosis seems relatively unlikely.  If there is enhancement of spinal nerve roots on MRI then this may, however, be consistent with AIDP.  Her visual disturbance appears to be a right homonymous hemianopia consistent with left hemispheric/occipital dysfunction, but there was no evidence of this on brain imaging and perhaps her visual deficits are due to ocular pathology instead.  Possible dysfunction of spinal cord and optic nerves may suggest NMO, but this seems unlikely.  Overall, her neurologic exam shows marked lack of effort, and if further workup is negative, it is possible that she has a functional neurologic disorder.  Suggest.  MRI of entire spine without and with contrast; further management will be guided by results of her MRI of spine; PT/OT.

## 2024-09-12 NOTE — ED PROVIDER NOTE - OBJECTIVE STATEMENT
38-year-old female with past medical history of affective dysregulation, explosive disorder, mild intellectual disability, CVA (in 2018 on Eliquis) presents to emergency department for evaluation of bilateral lower extremity numbness onset 9 AM this morning.  Patient states she has left-sided weakness from prior stroke, this morning around 9 AM was unable to feel her left lower extremity which is new.  Is also endorsing mild tingling sensation in the right lower extremity as well.  EMS was activated, patient was able to ambulate to stretcher with assistance.  Is also endorsing mild neck pain, which she states is chronic.  Denies recent illness, chest pain, difficulty breathing, vision changes, speech changes, upper extremity symptoms or recent falls.

## 2024-09-12 NOTE — H&P ADULT - TIME BILLING
review of laboratory data, radiology results, consultants' recommendations, documentation in [place], discussion with patient/ACP and interdisciplinary staff (such as , social workers, etc). Interventions were performed as documented above.

## 2024-09-12 NOTE — H&P ADULT - PROBLEM SELECTOR PROBLEM 5
----- Message from Master Suarez M.D. sent at 7/7/2023 12:16 AM PDT -----  Called the patient and left a message.  Please notify the patient that the x-ray of her ribs shows that she has an old rib fracture on the left side but no acute fracture on the right side.  The pain on the right side possibly is coming from an old compression fracture she has on the back x-ray.  If the back pain persists we may need to get an MRI of the mid back.   Prophylactic measure

## 2024-09-12 NOTE — CONSULT NOTE ADULT - SUBJECTIVE AND OBJECTIVE BOX
**STROKE CODE CONSULT NOTE**    Last known well time/Time of onset of symptoms: 9/12 @0800    HPI: 39 year-old right-handed woman with a PMH of obesity, factor V Leiden deficiency a/w DVT/PE (on Eliquis 5mg BID), bipolar disorder (on Abilify, Latuda, Seroquel and Trileptal), hypothyroidism, possible seizure disorder in childhood, PNES, IBS, GERD, PID, endometriosis, asthma, personality disorder, and mild intellectual disability who presented to [facility] ED from [facility] on 9/12/24 as code stroke. Patient reports that she remembers being in her usual state of health this morning at 0800. At 0900 she noticed weakness and numbness of her left lower extremity. Patient reports history of stroke in 2022 with residual left-sided weakness, however states that at baseline she is able to move her leg and walk independently, and now she is unable to move her left leg. Of note, there is no record of prior stroke in EMR. MRI brain done 3/2022 demonstrates scattered nonspecific foci of abnormal signal in the periventricular and subcortical white matter. Patient takes Eliquis for history DVT/PE and reports last dose at 0730 this morning. Denies headaches, visual changes, recent seizures. Denies other symptoms. Of note, patient has been seen in the ED numerous times in the past few weeks for for abdominal pain with unremarkable workup. Per EMS, patient's BP in the field was 90s systolic.     pre-mRS: 3  FS:  BP:     PAST MEDICAL & SURGICAL HISTORY:  Asthma      Seizure      Factor V Leiden      Bipolar disorder      Endometriosis      History of DVT in adulthood  RLE on eliquis      Hypothyroid      Seasonal allergies      Insomnia      GERD (gastroesophageal reflux disease)      HTN (hypertension)      Diabetes      No significant past surgical history          FAMILY HISTORY:  Family history of DVT (Mother)        SOCIAL HISTORY:  Smoking Cessation: Discussed    ROS:  Constitutional: No fever, weight loss or fatigue  Eyes: No eye pain, visual disturbances, or discharge  ENMT:  No difficulty hearing, tinnitus, vertigo; No sinus or throat pain  Neck: No pain or stiffness  Respiratory: No cough, wheezing, chills or hemoptysis  Cardiovascular: No chest pain, palpitations, shortness of breath, dizziness or leg swelling  Gastrointestinal: No abdominal pain. No nausea, vomiting or hematemesis; No diarrhea or constipation. Nohematochezia.  Genitourinary: No dysuria, frequency, hematuria or incontinence  Neurological: As per HPI  Skin: No itching, burning, rashes or lesions   Endocrine: No heat or cold intolerance; No hair loss  Musculoskeletal: No joint pain or swelling; No muscle, back or extremity pain  Psychiatric: No depression, anxiety, mood swings or difficulty sleeping  Heme/Lymph: No easy bruising or bleeding gums    MEDICATIONS  (STANDING):    MEDICATIONS  (PRN):      Allergies    [This allergen will not trigger allergy alert] pineapple allergenic extract (Swelling)  [This allergen will not trigger allergy alert] Toradol SOLN (Unknown)  TraMADol Hydrochloride (Unknown)  gabapentin (Unknown)  Zofran (Unknown)  ibuprofen (Pruritus)  latex (Blisters)  Zofran (Hives)  penicillin (Other)  Toradol (Unknown)  Toradol (Hives)  Zofran (Rash)  TraZODone Hydrochloride (Unknown)  trazodone (Other)  penicillin (Hives)    Intolerances        Vital Signs Last 24 Hrs  T(C): 36.9 (12 Sep 2024 12:03), Max: 36.9 (12 Sep 2024 12:03)  T(F): 98.5 (12 Sep 2024 12:03), Max: 98.5 (12 Sep 2024 12:03)  HR: 108 (12 Sep 2024 12:03) (108 - 108)  BP: 107/76 (12 Sep 2024 12:03) (107/76 - 107/76)  BP(mean): --  RR: 20 (12 Sep 2024 12:03) (20 - 20)  SpO2: 98% (12 Sep 2024 12:03) (98% - 98%)    Parameters below as of 12 Sep 2024 12:03  Patient On (Oxygen Delivery Method): room air        PHYSICAL EXAM:  Constitutional: WDWN; NAD  Cardiovascular: RRR, no appreciable murmurs; no carotid bruits  Neurologic:  Mental status: Awake, alert and oriented x3.  Recent and remote memory intact.  Naming, repetition and comprehension intact.  Attention/concentration intact.  No dysarthria, no aphasia.  Fund of knowledge appropriate.    Cranial nerves: Fundoscopic exam demonstrated no abnormalities, pupils equally round and reactive to light, visual fields full, no nystagmus, extraocular muscles intact, V1 through V3 intact bilaterally and symmetric, face symmetric, hearing intact to finger rub, palate elevation symmetric, tongue was midline, sternocleidomastoid/shoulder shrug strength bilaterally 5/5.    Motor:  Normal bulk and tone, strength 5/5 in bilateral upper and lower extremities.   strength 5/5.  Rapid alternating movements intact and symmetric.   Sensation: Intact to light touch, proprioception, and pinprick.  No neglect.   Coordination: No dysmetria on finger-to-nose and heel-to-shin.  No clumsiness.  Reflexes: 2+ in upper and lower extremities, downgoing toes bilaterally  Gait: Narrow and steady. No ataxia.  Romberg negative    NIHSS:    Fingerstick Blood Glucose: CAPILLARY BLOOD GLUCOSE      POCT Blood Glucose.: 142 mg/dL (12 Sep 2024 12:11)       LABS:                    RADIOLOGY & ADDITIONAL STUDIES:    IV-tenecteplase(Y/N):                                   Bolus time:  Reason IV-tenecteplase not given:   **STROKE CODE CONSULT NOTE**    Last known well time/Time of onset of symptoms: 9/12 @0800    HPI: 39 year-old right-handed woman with a PMH of obesity, factor V Leiden deficiency a/w DVT/PE (on Eliquis 5mg BID), bipolar disorder (on Abilify, Latuda, Seroquel and Trileptal), hypothyroidism, possible seizure disorder in childhood, PNES, IBS, GERD, PID, endometriosis, asthma, personality disorder, and mild intellectual disability who presented to [facility] ED from [facility] on 9/12/24 as code stroke. Patient reports that she remembers being in her usual state of health this morning at 0800. At 0900 she began to experience tingling and numbness in her left foot spreading up to her knee, after which she began to feel weakness of her left lower extremity. Patient reports history of stroke in 2022 (states was at outside hospital) with residual left-sided weakness, however states that at baseline she is able to move her leg and walk independently. Patient takes Eliquis for history DVT/PE and reports last dose at 0730 this morning. Denies headaches, visual changes, recent seizures. Denies other symptoms. Of note, patient has been seen in the ED numerous times in the past month for abdominal pain with unremarkable workup. Per EMS, patient's BP in the field was 90s systolic.     pre-mRS: 3  FS: 128  BP: 107/76    PAST MEDICAL & SURGICAL HISTORY:  Asthma      Seizure      Factor V Leiden      Bipolar disorder      Endometriosis      History of DVT in adulthood  RLE on eliquis      Hypothyroid      Seasonal allergies      Insomnia      GERD (gastroesophageal reflux disease)      HTN (hypertension)      Diabetes      No significant past surgical history          FAMILY HISTORY:  Family history of DVT (Mother)        SOCIAL HISTORY:  Smoking Cessation: Discussed    ROS:  Constitutional: No fever, weight loss or fatigue  Eyes: No eye pain, visual disturbances, or discharge  ENMT:  No difficulty hearing, tinnitus, vertigo; No sinus or throat pain  Neck: No pain or stiffness  Respiratory: No cough, wheezing, chills or hemoptysis  Cardiovascular: No chest pain, palpitations, shortness of breath, dizziness or leg swelling  Gastrointestinal: No abdominal pain. No nausea, vomiting or hematemesis; No diarrhea or constipation. Nohematochezia.  Genitourinary: No dysuria, frequency, hematuria or incontinence  Neurological: As per HPI  Skin: No itching, burning, rashes or lesions   Endocrine: No heat or cold intolerance; No hair loss  Musculoskeletal: No joint pain or swelling; No muscle, back or extremity pain  Psychiatric: No depression, anxiety, mood swings or difficulty sleeping  Heme/Lymph: No easy bruising or bleeding gums    MEDICATIONS  (STANDING):    MEDICATIONS  (PRN):      Allergies    [This allergen will not trigger allergy alert] pineapple allergenic extract (Swelling)  [This allergen will not trigger allergy alert] Toradol SOLN (Unknown)  TraMADol Hydrochloride (Unknown)  gabapentin (Unknown)  Zofran (Unknown)  ibuprofen (Pruritus)  latex (Blisters)  Zofran (Hives)  penicillin (Other)  Toradol (Unknown)  Toradol (Hives)  Zofran (Rash)  TraZODone Hydrochloride (Unknown)  trazodone (Other)  penicillin (Hives)    Intolerances        Vital Signs Last 24 Hrs  T(C): 36.9 (12 Sep 2024 12:03), Max: 36.9 (12 Sep 2024 12:03)  T(F): 98.5 (12 Sep 2024 12:03), Max: 98.5 (12 Sep 2024 12:03)  HR: 108 (12 Sep 2024 12:03) (108 - 108)  BP: 107/76 (12 Sep 2024 12:03) (107/76 - 107/76)  BP(mean): --  RR: 20 (12 Sep 2024 12:03) (20 - 20)  SpO2: 98% (12 Sep 2024 12:03) (98% - 98%)    Parameters below as of 12 Sep 2024 12:03  Patient On (Oxygen Delivery Method): room air        PHYSICAL EXAM:  Constitutional: No acute distress, flat affect    Neurologic:  Mental status: Awake, alert and oriented x3.  Naming, repetition and comprehension intact. Slowed speech.  No dysarthria, no aphasia.  Fund of knowledge appropriate.    Cranial nerves: Pupils equally round and reactive to light, unable to count fingers with her right eye in all visual fields, no nystagmus, extraocular muscles intact, slow saccades which improves with distraction, V1 through V3 intact bilaterally and symmetric, face symmetric, hearing intact to finger rub, palate elevation symmetric, tongue was midline, sternocleidomastoid/shoulder shrug strength bilaterally 5/5.    Motor:  Normal bulk and tone. Patient gives poor effort, at least 4-/5 in bilateral upper extremities. Right lower extremity moves antigravity but can't hold up. Left lower extremity 0/5, although patient is able to somewhat adduct RLE. Faith sign+  Sensation: Decreased sensation to light touch and pinprick over LLE>LUE  No neglect.   Coordination: No dysmetria on finger-to-nose and heel-to-shin.  No clumsiness.  Reflexes: 2+ in upper and lower extremities, [place]ute plantars bilaterally  Gait: Deferred     NIHSS: 10 (partial hemianopia, LLE drift, RLE drift, LLE sensory)    Fingerstick Blood Glucose: CAPILLARY BLOOD GLUCOSE      POCT Blood Glucose.: 142 mg/dL (12 Sep 2024 12:11)       LABS:               13.0   4.82  )-----------( 107      ( 12 Sep 2024 12:30 )             39.7     09-12    138  |  102  |  10  ----------------------------<  128<H>  4.8   |  19<L>  |  0.83    Ca    9.7      12 Sep 2024 12:30    TPro  8.1  /  Alb  4.5  /  TBili  0.2  /  DBili  x   /  AST  28  /  ALT  47<H>  /  AlkPhos  77  09-12    PT/INR - ( 12 Sep 2024 12:30 )   PT: 12.1 sec;   INR: 1.07 ratio         PTT - ( 12 Sep 2024 12:30 )  PTT:29.2 sec      RADIOLOGY & ADDITIONAL STUDIES:    CT Head noncontrast 9/12/24:  Impression:  Unremarkable noncontrast head CT.    MPRESSION: Unremarkable CTA of the neck and [place] of [place].    CT perfusion no perfusion deficit       **STROKE CODE CONSULT NOTE**    Last known well time/Time of onset of symptoms: 9/12 @0800    HPI: 39 year-old right-handed woman with a PMH of obesity, factor V Leiden deficiency a/w DVT/PE (on Eliquis 5mg BID), bipolar disorder, hypothyroidism, PNES, IBS, GERD, PID, endometriosis, asthma, personality disorder, and mild intellectual disability who presented to [facility] ED from assisted on 9/12/24 as code stroke. Patient reports that she remembers being in her usual state of health this morning at 0800. At 0900 she began to experience tingling and numbness in her left foot spreading up to her knee, after which she began to feel weakness of her left lower extremity. Patient reports history of stroke in 2022 (states was at outside hospital) with residual left-sided weakness, however states that at baseline she is able to move her leg and walk independently. Patient takes Eliquis for history DVT/PE and reports last dose at 0730 this morning. Denies headaches, visual changes, recent seizures. Denies other symptoms. Of note, patient has been seen in the ED numerous times in the past month for abdominal pain with unremarkable workup. Per EMS, patient's BP in the field was 90s systolic.     pre-mRS: 3  FS: 128  BP: 107/76    PAST MEDICAL & SURGICAL HISTORY:  Asthma      Seizure      Factor V Leiden      Bipolar disorder      Endometriosis      History of DVT in adulthood  RLE on eliquis      Hypothyroid      Seasonal allergies      Insomnia      GERD (gastroesophageal reflux disease)      HTN (hypertension)      Diabetes      No significant past surgical history          FAMILY HISTORY:  Family history of DVT (Mother)        SOCIAL HISTORY:  Smoking Cessation: Discussed    ROS:  Constitutional: No fever, weight loss or fatigue  Eyes: No eye pain, visual disturbances, or discharge  ENMT:  No difficulty hearing, tinnitus, vertigo; No sinus or throat pain  Neck: No pain or stiffness  Respiratory: No cough, wheezing, chills or hemoptysis  Cardiovascular: No chest pain, palpitations, shortness of breath, dizziness or leg swelling  Gastrointestinal: No abdominal pain. No nausea, vomiting or hematemesis; No diarrhea or constipation. Nohematochezia.  Genitourinary: No dysuria, frequency, hematuria or incontinence  Neurological: As per HPI  Skin: No itching, burning, rashes or lesions   Endocrine: No heat or cold intolerance; No hair loss  Musculoskeletal: No joint pain or swelling; No muscle, back or extremity pain  Psychiatric: No depression, anxiety, mood swings or difficulty sleeping  Heme/Lymph: No easy bruising or bleeding gums    MEDICATIONS  (STANDING):    MEDICATIONS  (PRN):      Allergies    [This allergen will not trigger allergy alert] pineapple allergenic extract (Swelling)  [This allergen will not trigger allergy alert] Toradol SOLN (Unknown)  TraMADol Hydrochloride (Unknown)  gabapentin (Unknown)  Zofran (Unknown)  ibuprofen (Pruritus)  latex (Blisters)  Zofran (Hives)  penicillin (Other)  Toradol (Unknown)  Toradol (Hives)  Zofran (Rash)  TraZODone Hydrochloride (Unknown)  trazodone (Other)  penicillin (Hives)    Intolerances        Vital Signs Last 24 Hrs  T(C): 36.9 (12 Sep 2024 12:03), Max: 36.9 (12 Sep 2024 12:03)  T(F): 98.5 (12 Sep 2024 12:03), Max: 98.5 (12 Sep 2024 12:03)  HR: 108 (12 Sep 2024 12:03) (108 - 108)  BP: 107/76 (12 Sep 2024 12:03) (107/76 - 107/76)  BP(mean): --  RR: 20 (12 Sep 2024 12:03) (20 - 20)  SpO2: 98% (12 Sep 2024 12:03) (98% - 98%)    Parameters below as of 12 Sep 2024 12:03  Patient On (Oxygen Delivery Method): room air        PHYSICAL EXAM:  Constitutional: No acute distress, flat affect    Neurologic:  Mental status: Awake, alert and oriented x3.  Naming, repetition and comprehension intact. Slowed speech.  No dysarthria, no aphasia.  Fund of knowledge appropriate.    Cranial nerves: Pupils equally round and reactive to light, unable to count fingers with her right eye in all visual fields, no nystagmus, extraocular muscles intact, slow saccades which improves with distraction, V1 through V3 intact bilaterally and symmetric, face symmetric, hearing intact to finger rub, palate elevation symmetric, tongue was midline, sternocleidomastoid/shoulder shrug strength bilaterally 5/5.    Motor:  Normal bulk and tone. Patient gives poor effort, at least 4-/5 in bilateral upper extremities. Right lower extremity moves antigravity but can't hold up. Left lower extremity 0/5, although patient is able to somewhat adduct RLE. Faith sign+  Sensation: Decreased sensation to light touch and pinprick over LLE>LUE  No neglect.   Coordination: No dysmetria on finger-to-nose and heel-to-shin.  No clumsiness.  Reflexes: 2+ in upper and lower extremities, [place] plantars bilaterally  Gait: Deferred     NIHSS: 10 (partial hemianopia, LLE drift, RLE drift, LLE sensory)    Fingerstick Blood Glucose: CAPILLARY BLOOD GLUCOSE      POCT Blood Glucose.: 142 mg/dL (12 Sep 2024 12:11)       LABS:               13.0   4.82  )-----------( 107      ( 12 Sep 2024 12:30 )             39.7     09-12    138  |  102  |  10  ----------------------------<  128<H>  4.8   |  19<L>  |  0.83    Ca    9.7      12 Sep 2024 12:30    TPro  8.1  /  Alb  4.5  /  TBili  0.2  /  DBili  x   /  AST  28  /  ALT  47<H>  /  AlkPhos  77  09-12    PT/INR - ( 12 Sep 2024 12:30 )   PT: 12.1 sec;   INR: 1.07 ratio         PTT - ( 12 Sep 2024 12:30 )  PTT:29.2 sec      RADIOLOGY & ADDITIONAL STUDIES:    CT Head noncontrast 9/12/24:  Impression:  Unremarkable noncontrast head CT.    MPRESSION: Unremarkable CTA of the neck and [place] of [place].    CT perfusion no perfusion deficit

## 2024-09-12 NOTE — CONSULT NOTE ADULT - ASSESSMENT
39 RH F PMHx DVT/PE on Eliquis, PNES, bipolar disorder on depakote, hypothyroidism, reported prior stroke with some L side deficits presents from group home as code stroke for LLE weakness and numbness, LKN 0800. Exam noted with inability to move LLE except for adduction, decreased sensation to light touch and pinprick of LLE>LUE, decreased power of RLE, positive Faith sign. Stroke imaging unremarkable.    pre-mRS: 3  LKN: 9/12 @0800  NIHSS: 10    Not a tenecteplase candidate due to out of window.  Not a mechanical thrombectomy candidate due to no LVO.    Impression: Likely functional neurological deficits. Rule out acute stroke due to right brain dysfunction.    Recommendations:  Diagnostics:  [] MRI brain without contrast   [] If MRI demonstrates stroke, would pursue TTE w/ bubble and possible ILR   [] Lipid panel, HbA1c  [] CBC, CMP, coagulation panel, troponin    Medications:  [] Continue home Eliquis 5 mg BID  [] Atorvastatin as per ASCVD   [] Lovenox SQ for DVT prophylaxis     Miscellaneous:  [] NPO unless passes dysphagia screen; swallow eval if fails  [] Keep BP permissive up to 220/110 for 48 hours followed by gradual normotension over 2-3 days   [] Telemonitoring  [] Neurological checks and vital signs q4h  [] BGM goals 140-180  [] PT/OT; S/S evaluation    * Case and plan not final until Attending attestation * 39 RH F PMHx DVT/PE on Eliquis, PNES, bipolar disorder on depakote, hypothyroidism, reported prior stroke with some L side deficits presents from group home as code stroke for LLE weakness and numbness, LKN 0800. Exam noted with inability to move LLE except for adduction, decreased sensation to light touch and pinprick of LLE>LUE, decreased power of RLE, positive Faith sign. Stroke imaging unremarkable.    pre-mRS: 3  LKN: 9/12 @0800  NIHSS: 10    Not a tenecteplase candidate due to out of window.  Not a mechanical thrombectomy candidate due to no LVO.    Impression: Left lower extremity motor and sensory deficit, likely functional. Rule out acute stroke due to right brain dysfunction.    Recommendations:  Diagnostics:  [] MRI brain without contrast   [] If MRI demonstrates stroke, would pursue TTE w/ bubble and possible ILR   [] Lipid panel, HbA1c  [] CBC, CMP, coagulation panel, troponin    Medications:  [] Continue home Eliquis 5 mg BID  [] Atorvastatin as per ASCVD   [] Lovenox SQ for DVT prophylaxis     Miscellaneous:  [] NPO unless passes dysphagia screen; swallow eval if fails  [] Keep BP permissive up to 220/110 for 48 hours followed by gradual normotension over 2-3 days   [] Telemonitoring  [] Neurological checks and vital signs q4h  [] BGM goals 140-180  [] PT/OT; S/S evaluation    Case discussed with stroke fellow Dr. Aponte under supervision of stroke attending Dr. Kuhn. Case and plan not final until Attending attestation

## 2024-09-12 NOTE — ED PROVIDER NOTE - NSCAREINITIATED _GEN_ER
Pramod Dela Cruz(Attending) Patient requests all Lab, Cardiology, and Radiology Results on their Discharge Instructions

## 2024-09-12 NOTE — ED PROVIDER NOTE - ATTENDING CONTRIBUTION TO CARE
Dr. Dela Cruz: 38 yo female with bipolar disorder, DM2, Factor V Leiden with DVT/PE/stroke in the past, on Eliquis, seizure disorder, HTN, hypothyroid, in ED as a stroke code, last known normal, with left LE numbness beginning around 8 AM today.  At baseline pt has left LE weakness but not numbness, but as of 8 AM today she has weakness AND numbness in the left LE, as well as weakness in the right LE, which is new.  No CP/SOB, N/V/D or abdominal pain.  Pt states she was going to eat breakfast when this occurred.  On exam pt chronically-ill appearing, in NAD, heart RRR, lungs CTAB, abd NTND, both legs equally weak (1-2/5 strength) and decreased sensation in left LE, and skin without rash.    I, Pramod Dela Cruz MD, personally made/approved the management plan for this patient and take responsibility for the patient's management. . Dr. Dela Cruz: 38 yo female with bipolar disorder, DM2, Factor V Leiden with DVT/PE/stroke in the past, on Eliquis, seizure disorder, HTN, hypothyroid, in ED as a stroke code, last known normal, with left LE numbness beginning around 8 AM today.  At baseline pt has left LE weakness but not numbness, but as of 8 AM today she has weakness AND numbness in the left LE, as well as weakness in the right LE, which is new.  No CP/SOB, N/V/D or abdominal pain.  Pt states she was going to eat breakfast when this occurred.  On exam pt chronically-ill appearing, in NAD, heart RRR, lungs CTAB, abd NTND, both legs equally weak (1-2/5 strength) and decreased sensation in left LE, and skin without rash.    I, Pramod Dela Cruz MD, personally made/approved the management plan for this patient and take responsibility for the patient's management.

## 2024-09-12 NOTE — H&P ADULT - PROBLEM SELECTOR PLAN 1
- CTA neck: negative  - CT brain: unremarkable  - dysphagia eval  - f/u neurology  - MRI brain, if positive TTE with bubble  - lipid panel HbA1C b12, tsh  - statin  - BP permissive up to 220/110 for 48 hours

## 2024-09-12 NOTE — ED PROVIDER NOTE - CLINICAL SUMMARY MEDICAL DECISION MAKING FREE TEXT BOX
38-year-old female with past medical history of affective dysregulation, explosive disorder, mild intellectual disability, CVA (in 2018 on Eliquis) presents to emergency department for evaluation of bilateral lower extremity numbness onset 9 AM this morning. Patient with unusual distribution on neurologic exam, 0/5 strength in LLE, 3/5 in RLE, with baseline weakness on L side from previous stroke. LNK 9am this morning. Patient on eliquis and compliant, would not be TPA candidate. Will evaluate for LVO as patient is still within window for thrombectomy if indicated. Will likely require admission.

## 2024-09-12 NOTE — ED ADULT NURSE NOTE - OBJECTIVE STATEMENT
Float RN  Received pt in bed A and O x 3in NAD PERRLA upper extremities are strong and equal b/l pt c/o left lower leg numbness, unable ot to lift the leg against gravity on assessment,  no sensory deficit noted on exam by neuro, CT studies completed IV initiated 18 G left upper arm labs drawn and sent. See code stroke flow sheet for detailed findings.

## 2024-09-12 NOTE — H&P ADULT - NSHPPHYSICALEXAM_GEN_ALL_CORE
Vital Signs Last 24 Hrs  T(C): 36.9 (12 Sep 2024 16:38), Max: 36.9 (12 Sep 2024 12:03)  T(F): 98.4 (12 Sep 2024 16:38), Max: 98.5 (12 Sep 2024 12:03)  HR: 90 (12 Sep 2024 16:38) (90 - 108)  BP: 101/67 (12 Sep 2024 16:38) (101/67 - 107/76)  BP(mean): --  RR: 18 (12 Sep 2024 16:38) (18 - 20)  SpO2: 97% (12 Sep 2024 16:38) (97% - 98%)    Parameters below as of 12 Sep 2024 12:03  Patient On (Oxygen Delivery Method): room air      CONSTITUTIONAL: NAD  EYES: PERRLA  ENMT: Moist oral mucosa, no pharyngeal injection or exudates  NECK: Supple  RESPIRATORY: Normal respiratory effort; lungs are clear to auscultation bilaterally  CARDIOVASCULAR: Regular rate and rhythm, normal S1 and S2, no murmur/rub/gallop; trace lower extremity edema; Peripheral pulses are 2+ bilaterally  ABDOMEN: Nontender to palpation, normoactive bowel sounds, no rebound/guarding;  MUSCULOSKELETAL:  no joint swelling or tenderness to palpation  PSYCH: A+O to person, place, and time  NEUROLOGY: CN 2-12 are intact decreased sensation bilateral lower extrem, left worse than right.

## 2024-09-12 NOTE — ED ADULT TRIAGE NOTE - CHIEF COMPLAINT QUOTE
Patient complains of left leg numbness and tingling since 9am, patient also complains of right sided neck pain that started one hour ago, Patient reports feeling well at 8am this morning, admits to taking her Eliquis today. Code stroke as per MD Dela Cruz. Left foot weakness and numbness noted, no further neurological deficits. pmhx; CVA with left sided residual, asthma, seizures, hypothyroidism, HLD, schizoaffective disorder

## 2024-09-13 LAB
ANION GAP SERPL CALC-SCNC: 12 MMOL/L — SIGNIFICANT CHANGE UP (ref 7–14)
BUN SERPL-MCNC: 11 MG/DL — SIGNIFICANT CHANGE UP (ref 7–23)
CALCIUM SERPL-MCNC: 8.8 MG/DL — SIGNIFICANT CHANGE UP (ref 8.4–10.5)
CHLORIDE SERPL-SCNC: 102 MMOL/L — SIGNIFICANT CHANGE UP (ref 98–107)
CHOLEST SERPL-MCNC: 151 MG/DL — SIGNIFICANT CHANGE UP
CO2 SERPL-SCNC: 25 MMOL/L — SIGNIFICANT CHANGE UP (ref 22–31)
CREAT SERPL-MCNC: 0.73 MG/DL — SIGNIFICANT CHANGE UP (ref 0.5–1.3)
EGFR: 109 ML/MIN/1.73M2 — SIGNIFICANT CHANGE UP
GLUCOSE SERPL-MCNC: 112 MG/DL — HIGH (ref 70–99)
HDLC SERPL-MCNC: 50 MG/DL — LOW
LIPID PNL WITH DIRECT LDL SERPL: 80 MG/DL — SIGNIFICANT CHANGE UP
NON HDL CHOLESTEROL: 101 MG/DL — SIGNIFICANT CHANGE UP
POTASSIUM SERPL-MCNC: 4.2 MMOL/L — SIGNIFICANT CHANGE UP (ref 3.5–5.3)
POTASSIUM SERPL-SCNC: 4.2 MMOL/L — SIGNIFICANT CHANGE UP (ref 3.5–5.3)
SODIUM SERPL-SCNC: 139 MMOL/L — SIGNIFICANT CHANGE UP (ref 135–145)
TRIGL SERPL-MCNC: 104 MG/DL — SIGNIFICANT CHANGE UP
TSH SERPL-MCNC: 0.86 UIU/ML — SIGNIFICANT CHANGE UP (ref 0.27–4.2)

## 2024-09-13 PROCEDURE — 99223 1ST HOSP IP/OBS HIGH 75: CPT

## 2024-09-13 PROCEDURE — 99233 SBSQ HOSP IP/OBS HIGH 50: CPT

## 2024-09-13 RX ORDER — RISPERIDONE 0.25 MG/1
3 TABLET, FILM COATED ORAL AT BEDTIME
Refills: 0 | Status: DISCONTINUED | OUTPATIENT
Start: 2024-09-14 | End: 2024-09-19

## 2024-09-13 RX ORDER — RISPERIDONE 0.25 MG/1
1 TABLET, FILM COATED ORAL AT BEDTIME
Refills: 0 | Status: COMPLETED | OUTPATIENT
Start: 2024-09-13 | End: 2024-09-13

## 2024-09-13 RX ORDER — SUCRALFATE 1 G/10ML
1 SUSPENSION ORAL
Refills: 0 | Status: DISCONTINUED | OUTPATIENT
Start: 2024-09-13 | End: 2024-09-19

## 2024-09-13 RX ORDER — NALTREXONE HYDROCHLORIDE 50 MG/1
1 TABLET, FILM COATED ORAL
Refills: 0 | DISCHARGE

## 2024-09-13 RX ORDER — FLU VACCINE TS 2012-2013(5YR+) 45MCG/.5ML
0.5 VIAL (ML) INTRAMUSCULAR ONCE
Refills: 0 | Status: COMPLETED | OUTPATIENT
Start: 2024-09-13 | End: 2024-09-13

## 2024-09-13 RX ORDER — RISPERIDONE 0.25 MG/1
3 TABLET, FILM COATED ORAL AT BEDTIME
Refills: 0 | Status: DISCONTINUED | OUTPATIENT
Start: 2024-09-13 | End: 2024-09-13

## 2024-09-13 RX ADMIN — DIVALPROEX SODIUM 750 MILLIGRAM(S): 125 CAPSULE, DELAYED RELEASE ORAL at 17:28

## 2024-09-13 RX ADMIN — Medication 25 MILLIGRAM(S): at 11:36

## 2024-09-13 RX ADMIN — DIVALPROEX SODIUM 750 MILLIGRAM(S): 125 CAPSULE, DELAYED RELEASE ORAL at 00:05

## 2024-09-13 RX ADMIN — Medication 100 MICROGRAM(S): at 06:34

## 2024-09-13 RX ADMIN — Medication 80 MILLIGRAM(S): at 21:49

## 2024-09-13 RX ADMIN — Medication 5 MILLIGRAM(S): at 22:11

## 2024-09-13 RX ADMIN — Medication 0.1 MILLIGRAM(S): at 00:06

## 2024-09-13 RX ADMIN — Medication 50 MILLIGRAM(S): at 02:35

## 2024-09-13 RX ADMIN — Medication 80 MILLIGRAM(S): at 00:08

## 2024-09-13 RX ADMIN — Medication 200 MILLIGRAM(S): at 00:04

## 2024-09-13 RX ADMIN — RISPERIDONE 1 MILLIGRAM(S): 0.25 TABLET, FILM COATED ORAL at 21:48

## 2024-09-13 RX ADMIN — SUCRALFATE 1 GRAM(S): 1 SUSPENSION ORAL at 17:27

## 2024-09-13 RX ADMIN — APIXABAN 5 MILLIGRAM(S): 5 TABLET, FILM COATED ORAL at 17:27

## 2024-09-13 RX ADMIN — DIVALPROEX SODIUM 750 MILLIGRAM(S): 125 CAPSULE, DELAYED RELEASE ORAL at 09:55

## 2024-09-13 RX ADMIN — Medication 200 MILLIGRAM(S): at 21:49

## 2024-09-13 RX ADMIN — Medication 50 MILLIGRAM(S): at 21:49

## 2024-09-13 RX ADMIN — ESCITALOPRAM OXALATE 20 MILLIGRAM(S): 10 TABLET ORAL at 11:36

## 2024-09-13 RX ADMIN — RISPERIDONE 2 MILLIGRAM(S): 0.25 TABLET, FILM COATED ORAL at 11:36

## 2024-09-13 RX ADMIN — Medication 3 MILLIGRAM(S): at 00:06

## 2024-09-13 RX ADMIN — Medication 3 MILLIGRAM(S): at 21:50

## 2024-09-13 RX ADMIN — RISPERIDONE 1 MILLIGRAM(S): 0.25 TABLET, FILM COATED ORAL at 00:07

## 2024-09-13 RX ADMIN — NALTREXONE HYDROCHLORIDE 50 MILLIGRAM(S): 50 TABLET, FILM COATED ORAL at 11:36

## 2024-09-13 RX ADMIN — APIXABAN 5 MILLIGRAM(S): 5 TABLET, FILM COATED ORAL at 06:34

## 2024-09-13 NOTE — PATIENT PROFILE ADULT - NSPROHMSYMPCOND_GEN_A_NUR
Informed consent was obtained.    Trigger points were palpated in multiple muscles of the neck and shoulder region. Areas of maximum tenderness, tightness and restriction of motion were assessed.     After alcohol prep, 0.5 mL of 2% Liodocaine with 20 mg of Kenalog was injected in 5 trigger points across 4 neck muscles.   There were no complications. Patient tolerated procedure well. The patient reported significant improvement in the pain symptoms as well as improved range of motion.    The patient was observed for 15 minutes and subsequently discharged.       none

## 2024-09-13 NOTE — PATIENT PROFILE ADULT - FUNCTIONAL ASSESSMENT - DAILY ACTIVITY ASSESSMENT TYPE
Patient's wife (on hippa) aware of stress test results. Patient is working in [place] and not available for any openings this week. Follow up scheduled 11/30. He will call next week is he is available for a sooner opening. Lori Bo MA      ----- Message from STANTON Newberry sent at 11/18/2020  8:41 AM EST -----  Schedule follow-up in 1 to 2 weeks.    Result Text     1.  Significantly degraded scintigraphic images are compatible with inferior wall ischemia.     2.  Minimally depressed post-rest ejection fraction of 53% with no focal wall motion abnormalities.     3.  No evidence of pharmacologically induced transient ischemic dilation or of increased lung uptake of radiopharmaceutical.       
Admission

## 2024-09-13 NOTE — PHYSICAL THERAPY INITIAL EVALUATION ADULT - GAIT DISTANCE, PT EVAL
The patient is currently presenting with significant ambulation difficulties. There is marked difficulty maintaining weight on the left lower extremity and advancing it during gait. Due to left lower extremity weakness, the patient is unable to ambulate without the assistance of a walker. The patient demonstrates unsteadiness on their feet, which is exacerbated by the left lower extremity weakness. Close monitoring of the patient's functional status is warranted at this time./10 feet

## 2024-09-13 NOTE — PATIENT PROFILE ADULT - FALL HARM RISK - HARM RISK INTERVENTIONS
Assistance with ambulation/Assistance OOB with selected safe patient handling equipment/Communicate Risk of Fall with Harm to all staff/Reinforce activity limits and safety measures with patient and family/Tailored Fall Risk Interventions/Use of alarms - bed, chair and/or voice tab/Bed in lowest position, wheels locked, appropriate side rails in place/Call bell, personal items and telephone in reach/Instruct patient to call for assistance before getting out of bed or chair/Non-slip footwear when patient is out of bed/[place] to call system/Physically safe environment - no spills, clutter or unnecessary equipment/Purposeful Proactive Rounding/Room/bathroom lighting operational, light cord in reach

## 2024-09-13 NOTE — PHYSICAL THERAPY INITIAL EVALUATION ADULT - ADDITIONAL COMMENTS
Pt lives in a group home with elevator access, no recent falls, reporting she is fully independent at baseline and has no complications with walking.   Patients Current SpO2: 99%

## 2024-09-13 NOTE — PATIENT PROFILE ADULT - FALL HARM RISK - FALLEN IN PAST
No
Posture, length, shape and position symmetric and appropriate for age; movement patterns with normal strength and range of motion; hips without evidence of dislocation on Olguin and Ortalani maneuvers and by gluteal fold patterns.

## 2024-09-13 NOTE — PHYSICAL THERAPY INITIAL EVALUATION ADULT - PATIENT/FAMILY/SIGNIFICANT OTHER GOALS STATEMENT, PT EVAL
Vaccine Information Statement    Influenza (Flu) Vaccine (Inactivated or Recombinant): What you need to know    Many Vaccine Information Statements are available in [place] and other languages. See www.immunize.org/vis  Hojas de Información Sobre Vacunas están disponibles en Español y en muchos otros idiomas. Visite www.immunize.org/vis    1. Why get vaccinated? Influenza (flu) is a contagious disease that spreads around the [place] every year, usually between October and May. Flu is caused by influenza viruses, and is spread mainly by coughing, sneezing, and close contact. Anyone can get flu. Flu strikes suddenly and can last several days. Symptoms vary by age, but can include:   fever/chills   sore throat   muscle aches   fatigue   cough   headache    runny or stuffy nose    Flu can also lead to pneumonia and blood infections, and cause diarrhea and seizures in children. If you have a medical condition, such as heart or lung disease, flu can make it worse. Flu is more dangerous for some people. Infants and young children, people 72years of age and older, pregnant women, and people with certain health conditions or a weakened immune system are at greatest risk. Each year thousands of people in the [facility] die from flu, and many more are hospitalized. Flu vaccine can:   keep you from getting flu,   make flu less severe if you do get it, and   keep you from spreading flu to your family and other people. 2. Inactivated and recombinant flu vaccines    A dose of flu vaccine is recommended every flu season. Children 6 months through 6years of age may need two doses during the same flu season. Everyone else needs only one dose each flu season.        Some inactivated flu vaccines contain a very small amount of a mercury-based preservative called thimerosal. Studies have not shown thimerosal in vaccines to be harmful, but flu vaccines that do not contain thimerosal are available. There is no live flu virus in flu shots. They cannot cause the flu. There are many flu viruses, and they are always changing. Each year a new flu vaccine is made to protect against three or four viruses that are likely to cause disease in the upcoming flu season. But even when the vaccine doesnt exactly match these viruses, it may still provide some protection    Flu vaccine cannot prevent:   flu that is caused by a virus not covered by the vaccine, or   illnesses that look like flu but are not. It takes about 2 weeks for protection to develop after vaccination, and protection lasts through the flu season. 3. Some people should not get this vaccine    Tell the person who is giving you the vaccine:     If you have any severe, life-threatening allergies. If you ever had a life-threatening allergic reaction after a dose of flu vaccine, or have a severe allergy to any part of this vaccine, you may be advised not to get vaccinated. Most, but not all, types of flu vaccine contain a small amount of egg protein.  If you ever had Guillain-Barré Syndrome (also called GBS). Some people with a history of GBS should not get this vaccine. This should be discussed with your doctor.  If you are not feeling well. It is usually okay to get flu vaccine when you have a mild illness, but you might be asked to come back when you feel better. 4. Risks of a vaccine reaction    With any medicine, including vaccines, there is a chance of reactions. These are usually mild and go away on their own, but serious reactions are also possible. Most people who get a flu shot do not have any problems with it.      Minor problems following a flu shot include:    soreness, redness, or swelling where the shot was given     hoarseness   sore, red or itchy eyes   cough   fever   aches   headache   itching   fatigue  If these problems occur, they usually begin soon after the shot and last 1 or 2 days. More serious problems following a flu shot can include the following:     There may be a small increased risk of Guillain-Barré Syndrome (GBS) after inactivated flu vaccine. This risk has been estimated at 1 or 2 additional cases per million people vaccinated. This is much lower than the risk of severe complications from flu, which can be prevented by flu vaccine.  Young children who get the flu shot along with pneumococcal vaccine (PCV13) and/or DTaP vaccine at the same time might be slightly more likely to have a seizure caused by fever. Ask your doctor for more information. Tell your doctor if a child who is getting flu vaccine has ever had a seizure. Problems that could happen after any injected vaccine:      People sometimes faint after a medical procedure, including vaccination. Sitting or lying down for about 15 minutes can help prevent fainting, and injuries caused by a fall. Tell your doctor if you feel dizzy, or have vision changes or ringing in the ears.  Some people get severe pain in the shoulder and have difficulty moving the arm where a shot was given. This happens very rarely.  Any medication can cause a severe allergic reaction. Such reactions from a vaccine are very rare, estimated at about 1 in a million doses, and would happen within a few minutes to a few hours after the vaccination. As with any medicine, there is a very remote chance of a vaccine causing a serious injury or death. The safety of vaccines is always being monitored. For more information, visit: www.cdc.gov/vaccinesafety/    5. What if there is a serious reaction? What should I look for?  Look for anything that concerns you, such as signs of a severe allergic reaction, very high fever, or unusual behavior.     Signs of a severe allergic reaction can include hives, swelling of the face and throat, difficulty breathing, a fast heartbeat, dizziness, and weakness  usually within a few minutes to a few hours after the vaccination. What should I do?  If you think it is a severe allergic reaction or other emergency that cant wait, call 9-1-1 and get the person to the nearest hospital. Otherwise, call your doctor.  Reactions should be reported to the Vaccine Adverse Event Reporting System (VAERS). Your doctor should file this report, or you can do it yourself through  the VAERS web site at www.vaers. [facility].gov, or by calling 2-469.940.1479. VAERS does not give medical advice. 6. The National Vaccine Injury Compensation Program    The [facility] Vaccine Injury Compensation Program (VICP) is a federal program that was created to compensate people who may have been injured by certain vaccines. Persons who believe they may have been injured by a vaccine can learn about the program and about filing a claim by calling 8-902.940.5709 or visiting the [employer] website at www.[facility].gov/vaccinecompensation. There is a time limit to file a claim for compensation. 7. How can I learn more?  Ask your healthcare provider. He or she can give you the vaccine package insert or suggest other sources of information.  Call your local or state health department.  Contact the Centers for Disease Control and Prevention (CDC):  - Call 2-792.187.4581 (1-800-CDC-INFO) or  - Visit CDCs website at www.cdc.gov/flu    Vaccine Information Statement   Inactivated Influenza Vaccine   8/7/2015  42 SHIV Perez [id number]    Department of Health and Human Services  Centers for Disease Control and Prevention    Office Use Only Pt would like to improve functional mobility

## 2024-09-14 PROCEDURE — 99232 SBSQ HOSP IP/OBS MODERATE 35: CPT

## 2024-09-14 RX ORDER — DIPHENHYDRAMINE HCL 50 MG
25 CAPSULE ORAL ONCE
Refills: 0 | Status: COMPLETED | OUTPATIENT
Start: 2024-09-14 | End: 2024-09-14

## 2024-09-14 RX ORDER — DIPHENHYDRAMINE HCL 50 MG
25 CAPSULE ORAL EVERY 6 HOURS
Refills: 0 | Status: DISCONTINUED | OUTPATIENT
Start: 2024-09-14 | End: 2024-09-19

## 2024-09-14 RX ADMIN — DIVALPROEX SODIUM 750 MILLIGRAM(S): 125 CAPSULE, DELAYED RELEASE ORAL at 17:57

## 2024-09-14 RX ADMIN — Medication 5 MILLIGRAM(S): at 13:14

## 2024-09-14 RX ADMIN — Medication 3 MILLIGRAM(S): at 21:08

## 2024-09-14 RX ADMIN — Medication 50 MILLIGRAM(S): at 21:07

## 2024-09-14 RX ADMIN — Medication 80 MILLIGRAM(S): at 21:07

## 2024-09-14 RX ADMIN — ESCITALOPRAM OXALATE 20 MILLIGRAM(S): 10 TABLET ORAL at 11:37

## 2024-09-14 RX ADMIN — APIXABAN 5 MILLIGRAM(S): 5 TABLET, FILM COATED ORAL at 17:57

## 2024-09-14 RX ADMIN — Medication 0.1 MILLIGRAM(S): at 21:08

## 2024-09-14 RX ADMIN — Medication 200 MILLIGRAM(S): at 21:07

## 2024-09-14 RX ADMIN — SUCRALFATE 1 GRAM(S): 1 SUSPENSION ORAL at 17:57

## 2024-09-14 RX ADMIN — DIVALPROEX SODIUM 750 MILLIGRAM(S): 125 CAPSULE, DELAYED RELEASE ORAL at 09:42

## 2024-09-14 RX ADMIN — RISPERIDONE 3 MILLIGRAM(S): 0.25 TABLET, FILM COATED ORAL at 21:07

## 2024-09-14 RX ADMIN — Medication 100 MICROGRAM(S): at 04:19

## 2024-09-14 RX ADMIN — NALTREXONE HYDROCHLORIDE 50 MILLIGRAM(S): 50 TABLET, FILM COATED ORAL at 11:38

## 2024-09-14 RX ADMIN — Medication 25 MILLIGRAM(S): at 11:29

## 2024-09-14 RX ADMIN — APIXABAN 5 MILLIGRAM(S): 5 TABLET, FILM COATED ORAL at 05:21

## 2024-09-14 RX ADMIN — SUCRALFATE 1 GRAM(S): 1 SUSPENSION ORAL at 05:21

## 2024-09-15 LAB
ANION GAP SERPL CALC-SCNC: 13 MMOL/L — SIGNIFICANT CHANGE UP (ref 7–14)
BUN SERPL-MCNC: 14 MG/DL — SIGNIFICANT CHANGE UP (ref 7–23)
CALCIUM SERPL-MCNC: 9.3 MG/DL — SIGNIFICANT CHANGE UP (ref 8.4–10.5)
CHLORIDE SERPL-SCNC: 102 MMOL/L — SIGNIFICANT CHANGE UP (ref 98–107)
CO2 SERPL-SCNC: 24 MMOL/L — SIGNIFICANT CHANGE UP (ref 22–31)
CREAT SERPL-MCNC: 0.75 MG/DL — SIGNIFICANT CHANGE UP (ref 0.5–1.3)
EGFR: 105 ML/MIN/1.73M2 — SIGNIFICANT CHANGE UP
GLUCOSE SERPL-MCNC: 90 MG/DL — SIGNIFICANT CHANGE UP (ref 70–99)
HCT VFR BLD CALC: 33.8 % — LOW (ref 34.5–45)
HGB BLD-MCNC: 11 G/DL — LOW (ref 11.5–15.5)
MAGNESIUM SERPL-MCNC: 2 MG/DL — SIGNIFICANT CHANGE UP (ref 1.6–2.6)
MCHC RBC-ENTMCNC: 31.3 PG — SIGNIFICANT CHANGE UP (ref 27–34)
MCHC RBC-ENTMCNC: 32.5 GM/DL — SIGNIFICANT CHANGE UP (ref 32–36)
MCV RBC AUTO: 96.3 FL — SIGNIFICANT CHANGE UP (ref 80–100)
NRBC # BLD: 0 /100 WBCS — SIGNIFICANT CHANGE UP (ref 0–0)
NRBC # FLD: 0 K/UL — SIGNIFICANT CHANGE UP (ref 0–0)
PHOSPHATE SERPL-MCNC: 4 MG/DL — SIGNIFICANT CHANGE UP (ref 2.5–4.5)
PLATELET # BLD AUTO: 107 K/UL — LOW (ref 150–400)
POTASSIUM SERPL-MCNC: 4.5 MMOL/L — SIGNIFICANT CHANGE UP (ref 3.5–5.3)
POTASSIUM SERPL-SCNC: 4.5 MMOL/L — SIGNIFICANT CHANGE UP (ref 3.5–5.3)
RBC # BLD: 3.51 M/UL — LOW (ref 3.8–5.2)
RBC # FLD: 14.7 % — HIGH (ref 10.3–14.5)
SODIUM SERPL-SCNC: 139 MMOL/L — SIGNIFICANT CHANGE UP (ref 135–145)
WBC # BLD: 5.64 K/UL — SIGNIFICANT CHANGE UP (ref 3.8–10.5)
WBC # FLD AUTO: 5.64 K/UL — SIGNIFICANT CHANGE UP (ref 3.8–10.5)

## 2024-09-15 PROCEDURE — 72158 MRI LUMBAR SPINE W/O & W/DYE: CPT | Mod: 26

## 2024-09-15 PROCEDURE — 72157 MRI CHEST SPINE W/O & W/DYE: CPT | Mod: 26

## 2024-09-15 PROCEDURE — 72156 MRI NECK SPINE W/O & W/DYE: CPT | Mod: 26

## 2024-09-15 PROCEDURE — 99232 SBSQ HOSP IP/OBS MODERATE 35: CPT

## 2024-09-15 RX ADMIN — RISPERIDONE 3 MILLIGRAM(S): 0.25 TABLET, FILM COATED ORAL at 21:02

## 2024-09-15 RX ADMIN — DIVALPROEX SODIUM 750 MILLIGRAM(S): 125 CAPSULE, DELAYED RELEASE ORAL at 17:53

## 2024-09-15 RX ADMIN — Medication 80 MILLIGRAM(S): at 21:02

## 2024-09-15 RX ADMIN — DIVALPROEX SODIUM 750 MILLIGRAM(S): 125 CAPSULE, DELAYED RELEASE ORAL at 09:50

## 2024-09-15 RX ADMIN — APIXABAN 5 MILLIGRAM(S): 5 TABLET, FILM COATED ORAL at 17:54

## 2024-09-15 RX ADMIN — Medication 200 MILLIGRAM(S): at 21:02

## 2024-09-15 RX ADMIN — Medication 5 MILLIGRAM(S): at 22:39

## 2024-09-15 RX ADMIN — Medication 50 MILLIGRAM(S): at 21:07

## 2024-09-15 RX ADMIN — ESCITALOPRAM OXALATE 20 MILLIGRAM(S): 10 TABLET ORAL at 11:33

## 2024-09-15 RX ADMIN — Medication 100 MICROGRAM(S): at 05:03

## 2024-09-15 RX ADMIN — SUCRALFATE 1 GRAM(S): 1 SUSPENSION ORAL at 17:54

## 2024-09-15 RX ADMIN — NALTREXONE HYDROCHLORIDE 50 MILLIGRAM(S): 50 TABLET, FILM COATED ORAL at 11:32

## 2024-09-15 RX ADMIN — Medication 25 MILLIGRAM(S): at 11:32

## 2024-09-15 RX ADMIN — APIXABAN 5 MILLIGRAM(S): 5 TABLET, FILM COATED ORAL at 06:10

## 2024-09-15 RX ADMIN — SUCRALFATE 1 GRAM(S): 1 SUSPENSION ORAL at 06:10

## 2024-09-15 RX ADMIN — Medication 25 MILLIGRAM(S): at 11:33

## 2024-09-15 RX ADMIN — Medication 3 MILLIGRAM(S): at 21:05

## 2024-09-15 RX ADMIN — Medication 0.1 MILLIGRAM(S): at 21:06

## 2024-09-15 NOTE — DISCHARGE NOTE PROVIDER - NSFOLLOWUPCLINICS_GEN_ALL_ED_FT
[facility]  Neurology Autoimmune Encephalitis  [address] [place] [address]  Phone: [phone]  Fax: [phone]    [facility]  Psychiatry  [address] [place] [address]  Phone: [phone]  Fax:

## 2024-09-15 NOTE — DISCHARGE NOTE PROVIDER - NSDCCPCAREPLAN_GEN_ALL_CORE_FT
PRINCIPAL DISCHARGE DIAGNOSIS  Diagnosis: Numbness or tingling  Assessment and Plan of Treatment: We did an MRI of your brain and spine that didn't show any cause for your lower extremity weakness or numbness. Please continue to participate in physical therapy to help regain your strength.      SECONDARY DISCHARGE DIAGNOSES  Diagnosis: Pituitary mass  Assessment and Plan of Treatment: When they did an MRI of your brain, they saw a small area that could be a growth or a benign fluid collection. Please get an outpatient MRI of your pituitary gland to get a better look at this area and follow up with a neurologist.

## 2024-09-15 NOTE — DISCHARGE NOTE PROVIDER - CARE PROVIDER_API CALL
Rohan Braxton  Internal Medicine  [address] [place] [address]  Phone: [phone]  Fax: [phone]  Follow Up Time: 2 weeks

## 2024-09-15 NOTE — DISCHARGE NOTE PROVIDER - NSDCFUADDAPPT_GEN_ALL_CORE_FT
Please follow-up with NSGY after obtaining MRI brain wwo contrast outpatient for pituitary lesion  [facility] Neuro[facility]  [address], [address], [place] [address]  Phone: [phone] Please follow-up with NSGY after obtaining MRI brain wwo contrast outpatient for pituitary lesion  [facility] Neurosurgery  [address], [address], [place] [address]  Phone: [phone]    Please follow up with general neurology at [address] 1-2 weeks after discharge (call [phone] to schedule this appointment)

## 2024-09-15 NOTE — DISCHARGE NOTE PROVIDER - NSDCCPTREATMENT_GEN_ALL_CORE_FT
PRINCIPAL PROCEDURE  Procedure: MR brain  Findings and Treatment: IMPRESSION: No MR imaging evidence of demyelinating disease. No evidence   of cord or conus medullaris or cauda equina compression. No abnormal   intrathecal enhancement.  Degenerative disc disease and disc herniation at the C5-C6 level.  7 mm pituitary gland lesion for which the differential diagnosis includes   an adenoma or complex Rathke's cleft cyst. A dedicated contrast enhanced   MRI study of the pituitary gland can be obtained

## 2024-09-15 NOTE — DISCHARGE NOTE PROVIDER - NSDCMRMEDTOKEN_GEN_ALL_CORE_FT
acetaminophen 500 mg oral tablet: 2 tab(s) orally every 6 hours  apixaban 5 mg oral tablet: 1 tab(s) orally every 12 hours  atorvastatin 40 mg oral tablet: 1 tab(s) orally once a day  Carafate 1 g oral tablet: 1 tab(s) orally every 6 hours As needed for acidity  cloNIDine 0.1 mg oral tablet: 1 orally once a day (at bedtime)  cyclobenzaprine 5 mg oral tablet: 1 tab(s) orally 2 times a day  Depakote  mg oral tablet, extended release: 3 tab(s) orally every 12 hours  diphenhydrAMINE 50 mg oral tablet: 1 tab(s) orally once a day (at bedtime)  levothyroxine 100 mcg (0.1 mg) oral tablet: 1 tab(s) orally once a day  Lexapro 20 mg oral tablet: 1 tab(s) orally once a day  melatonin 1.5 mg oral tablet, chewable: 2 orally once a day (at bedtime)  MiraLax oral powder for reconstitution: 17 gram(s) orally 2 times a day  MiraLax oral powder for reconstitution: 17 gram(s) orally 2 times a day  naltrexone 50 mg oral tablet: 1 tab(s) orally once a day  Polyethylene Glycol 3350: 17 gram(s) orally once a day  QUEtiapine 200 mg oral tablet: 1 tab(s) orally once a day   risperiDONE 2 mg oral tablet: 1 tab(s) orally once a day (in the morning)  risperiDONE 3 mg oral tablet: 1 orally once a day (at bedtime)  senna (sennosides) 15 mg oral tablet, chewable: 1 tab(s) chewed once a day as needed for constipation, may take if no bowel movement after 2 days, do not use for more then 1 week  Seroquel 25 mg oral tablet: 1 tab(s) orally once a day (in the morning)   acetaminophen 325 mg oral tablet: 2 tab(s) orally every 6 hours As needed Temp greater or equal to 38C (100.4F), Mild Pain (1 - 3)  aluminum hydroxide-magnesium hydroxide 200 mg-200 mg/5 mL oral suspension: 30 milliliter(s) orally every 4 hours As needed Dyspepsia  apixaban 5 mg oral tablet: 1 tab(s) orally every 12 hours  atorvastatin 80 mg oral tablet: 1 tab(s) orally once a day (at bedtime)  cloNIDine 0.1 mg oral tablet: 1 orally once a day (at bedtime)  cyclobenzaprine 5 mg oral tablet: 1 tab(s) orally 2 times a day As needed Muscle Spasm  Depakote  mg oral tablet, extended release: 3 tab(s) orally every 12 hours  diphenhydrAMINE 25 mg oral capsule: 1 cap(s) orally every 6 hours As needed Rash and/or Itching  diphenhydrAMINE 50 mg oral capsule: 1 cap(s) orally once a day (at bedtime)  levothyroxine 100 mcg (0.1 mg) oral tablet: 1 tab(s) orally once a day  Lexapro 20 mg oral tablet: 1 tab(s) orally once a day  melatonin 3 mg oral tablet: 1 tab(s) orally once a day (at bedtime)  naltrexone 50 mg oral tablet: 1 tab(s) orally once a day  QUEtiapine 200 mg oral tablet: 1 tab(s) orally once a day (at bedtime)  QUEtiapine 25 mg oral tablet: 1 tab(s) orally once a day  risperiDONE 3 mg oral tablet: 1 orally once a day (at bedtime)  sucralfate 1 g oral tablet: 1 tab(s) orally 2 times a day

## 2024-09-15 NOTE — DISCHARGE NOTE PROVIDER - HOSPITAL COURSE
38 year old female with past medical history of explosive disorder, mild intellectual disability, CVA (in 2018 on Eliquis), factor V Leiden deficiency a/w DVT/PE, hypothyroid, GERD presents to emergency department for evaluation of bilateral lower extremity numbness.     #General weakness.   - CTA neck: negative  - CT brain: unremarkable  - MRI brain negative  - whole spine mri ordered as per neurology recs: IMPRESSION: No MR imaging evidence of demyelinating disease. Degenerative disc disease and disc herniation at the C5-C6 level. 7 mm pituitary gland lesion noted.    - f/u neurology for further recs *******    #History of CVA (cerebrovascular accident).   - previous CVA  - left sided residual deficits  - continue apixiban.    #Factor V Leiden.   - continue apixiban.    #Hypothyroid.   - continue home levothyroxine  - tsh WNL. HPI:  38 year old female with past medical history of explosive disorder, mild intellectual disability, CVA (in 2018 on Eliquis), factor V Leiden deficiency a/w DVT/PE, hypothyroid, GERD presents to emergency department for evaluation of bilateral lower extremity numbness onset 9 AM this morning. PT has left sided weakness as a residual from previous stroke but this am she had new L lower extrem numbness and tingling. She also admits to pain on the left side. She denies chest pain, sob, nausea, vomiting, diarrhea, constipation, abdominal pain. She admits to headache and vision changes.     Pts medical decision maker: [phone] or [phone] (12 Sep 2024 17:21)    Hospital Course:  Pt presented for evaluation of bilateral lower extremity numbness/weakness. CTA neck negative. CT brain unremarkable. MRI brain negative. Whole spine mri: No evidence of demyelinating disease. Degenerative disc disease and disc herniation at the C5-C6 level. 7 mm pituitary gland lesion noted. Neurology reports no further inpatient neurological work-up indicated at this time. PT recommended [facility]. Pt continues to complain of lower extremity weakness with poor effort on exam. However pt is able to move LEs during other activities. Concern for psychogenic component. Pt medically stable for discharge to [facility].    Important Medication Changes and Reason:    Active or Pending Issues Requiring Follow-up:  - follow-up with NSGY after obtaining MRI brain w/wo contrast outpatient for pituitary lesion  - follow-up with behavioral health service outpatient  - follow up with general neurology at [address] [place] [address] 1-2 weeks after discharge (patient to call [phone] to schedule this appointment)    Advanced Directives:   [x] Full code  [ ] DNR  [ ] Hospice    Discharge Diagnoses: bilateral lower extremity weakness, explosive disorder, mild intellectual disability, CVA (in 2018 on Eliquis), factor V Leiden deficiency, DVT/PE, hypothyroid, GERD   HPI:  38 year old female with past medical history of explosive disorder, mild intellectual disability, CVA (in 2018 on Eliquis), factor V Leiden deficiency a/w DVT/PE, hypothyroid, GERD presents to emergency department for evaluation of bilateral lower extremity numbness onset 9 AM this morning. PT has left sided weakness as a residual from previous stroke but this am she had new L lower extrem numbness and tingling. She also admits to pain on the left side. She denies chest pain, sob, nausea, vomiting, diarrhea, constipation, abdominal pain. She admits to headache and vision changes.     Pts medical decision maker: [phone] or [phone] (12 Sep 2024 17:21)    Hospital Course:  Pt presented for evaluation of bilateral lower extremity numbness/weakness. CTA neck negative. CT brain unremarkable. MRI brain negative. Whole spine mri: No evidence of demyelinating disease. Degenerative disc disease and disc herniation at the C5-C6 level. 7 mm pituitary gland lesion noted. Neurology reports no further inpatient neurological work-up indicated at this time. PT recommended [facility]. Pt continues to complain of lower extremity weakness with poor effort on exam. However pt is able to move LEs during other activities. Concern for psychogenic component. Pt medically stable for discharge to [facility].    Important Medication Changes and Reason:    Active or Pending Issues Requiring Follow-up:  - follow-up with NSGY after obtaining MRI brain w/wo contrast outpatient for pituitary lesion  - follow-up with behavioral health service outpatient  - follow up with general neurology at [address] [place] [address] 1-2 weeks after discharge (patient to call [phone] to schedule this appointment)    Advanced Directives:   [x] Full code  [ ] DNR  [ ] Hospice    Discharge Diagnoses: bilateral lower extremity weakness, explosive disorder, mild intellectual disability, CVA (in 2018 on Eliquis), factor V Leiden deficiency, DVT/PE, hypothyroid, GERD, secondary hypercoagulable state

## 2024-09-15 NOTE — DISCHARGE NOTE PROVIDER - CARE PROVIDERS DIRECT ADDRESSES
[email or web address]

## 2024-09-15 NOTE — DISCHARGE NOTE PROVIDER - NSDCQMCOGNITION_NEU_ALL_CORE
Clearance Decision: Based on today's exam, patient is Cleared for sports for 1 year without restrictions.  See sports physical form for details. Form reviewed with parent/legal guardian for accuracy.      Patient education & follow-up instructions    It is important to follow-up with your PCP for your annual preventative wellness visit.  Continue regular follow-up with your primary care provider or call [phone] for assistance in selecting a primary/family provider.      Marissa Rebolledo, CNP      Concussions     A concussion is any injury to the brain that disrupts normal brain function typically on a temporary basis. Concussions are usually caused by a blow or jolt to the head but may occur from a hit to the body that produces a reflex force to the head.   The following is information from the American Academy of Pediatrics about concussions, including guidance on treatment and prevention.  When do concussions occur?  Concussions can happen in any sport but more often occur in collision sports, such as football, rugby, or ice hockey. They also are common in contact sports that don't require helmets, such as soccer, basketball, wrestling, cheerleading, and lacrosse. Many concussions also occur outside organized sports. For example, a child riding a bike or skateboard can fall down and hit their head on the street or an obstacle.  Symptoms  The symptoms of a concussion range from subtle to obvious and usually happen right after the injury but some may take hours to a few days to develop. Athletes who have had concussions may report feeling normal before their brain has fully recovered. With most concussions, the player is not knocked out or unconscious.  Symptoms of a concussion include the following:  Unequal pupils--this is a serious finding after head injury--GO TO THE ER FOR EVALUATION  Headache  Nausea or vomiting  Dizziness or balance problems  Double or blurry vision   Sensitivity to light   Sensitivity  to noise  Feeling dazed or stunned  Feeling mentally \"foggy\"  Trouble concentrating  Trouble remembering  Confused or forgetful about recent events  Slow to answer questions  Changes in mood--irritable, sad, emotional, nervous  Drowsiness  Sleeping more or less than usual  Trouble falling asleep  What to do if you suspect a concussion  Concussions should be taken seriously and all athletes with suspected concussions should not return to full sports practice or participation until cleared by a doctor. A doctor can confirm the diagnosis of concussion and decide when it is OK for the athlete to return to play. Athletes who continue to play after their injury are at increased risk for worse symptoms, a more prolonged recovery and more serious injury to the brain if additional head injuries occur while recovering.  No one knows how many concussions are too many before permanent damage occurs. Repeated concussions are of greater concern, especially if each one takes longer to resolve or if a repeat concussion occurs from a light blow. The evaluating doctor needs to know about all prior concussions, including those that occurred outside of a sports setting, in order to make proper recommendations regarding return to play and future sports participation.  Treatment  The best treatment for a concussion is reducing physical and mental activity. Children should be monitored often, but there is no need for wake-up checks during sleep. School attendance and work may need to be modified with tests and projects postponed. Students need to be excused from gym class or recess activities. Light physical exertion may be recommended to improve recovery. Physical therapy may be initiated as some problems associated with concussion may need active rehabilitation. Any progressive worsening of concussion symptoms or changes in behavior should be immediately reported to your doctor.  Returning to physical activity  Recovery time from  concussion is variable with each injury but may be prolonged in athletes who continue to play immediately after their concussion. An athlete may feel better and want to return to play before their brain has completely recovered. Given the uncertain and unpredictable time frame for recovery, all formal sports activity should be suspended until symptoms have completely resolved at rest. A stepwise return to physical activity can begin once the athlete has clearance from their doctor. Having an , if available, involved in monitoring this plan can be very helpful. It is important to pay close attention to worsening symptoms (like increasing headache, nausea, or dizziness) while completing the activity progression. Any concussion-related symptoms that return with exertion are a clear indicator that the concussion has not healed. Final clearance to return to full activity should also be at the direction of a physician.  Prevention  Not all concussions can be prevented, but some may be avoided. Helmets should be worn for any riding activities (like horseback, all-terrain vehicle [ATV], motorbike, bike, skateboard, or snowboard) or contact sports (like football, hockey, or lacrosse). Helmets should fit appropriately and be in good condition. Athletes should be taught safe playing techniques and to follow the rules of the game. Most importantly, every athlete needs to know how crucial it is to let their , , or parent know if they have hit their head or have symptoms of a head injury--even if it means stopping play. Never ignore a head injury, no matter how minor.     No difficulties

## 2024-09-15 NOTE — DISCHARGE NOTE PROVIDER - ATTENDING DISCHARGE PHYSICAL EXAMINATION:
VITAL SIGNS:  Vital Signs Last 24 Hrs  T(C): 36.8 (19 Sep 2024 09:22), Max: 37 (18 Sep 2024 21:22)  T(F): 98.2 (19 Sep 2024 09:22), Max: 98.6 (18 Sep 2024 21:22)  HR: 105 (19 Sep 2024 09:22) (92 - 105)  BP: 114/88 (19 Sep 2024 09:22) (96/53 - 114/88)  BP(mean): --  RR: 18 (19 Sep 2024 09:22) (18 - 18)  SpO2: 99% (19 Sep 2024 09:22) (96% - 100%)    Parameters below as of 19 Sep 2024 09:22  Patient On (Oxygen Delivery Method): room air        PHYSICAL EXAM:    General: NAD  HEENT: MMM  Neck: supple  Cardiovascular: +S1/S2; RRR  Respiratory: CTA B/L; no W/R/R  Gastrointestinal: soft, NT/ND  Extremities: WWP; no edema, clubbing or cyanosis  Neurological: awake and alert; communicating and able to follow commands; unable to lift b/l LEs off of bed, appears to have poor effort; minimal dorsi/plantar flexion of b/l ankles with decreased range of motion; moving b/l UEs without issue

## 2024-09-15 NOTE — DISCHARGE NOTE PROVIDER - NSDCFUSCHEDAPPT_GEN_ALL_CORE_FT
Raoul Weller  [facility]  CARDIOLOGY [address]  Scheduled Appointment: 09/19/2024    [facility]  PEDALLERGY [address]  Scheduled Appointment: 09/20/2024    Janet Calix  [facility]  NEUROLOGY [address]  Scheduled Appointment: 09/23/2024    Rohan Braxton  [facility]  INTMED [address]  Scheduled Appointment: 11/04/2024     [facility]  PEDALLERGY [address]  Scheduled Appointment: 09/20/2024    Janet Calix  [facility]  NEUROLOGY [address]  Scheduled Appointment: 09/23/2024    Rohan Braxton  [facility]  INTMED [address]  Scheduled Appointment: 11/04/2024

## 2024-09-16 LAB
ANION GAP SERPL CALC-SCNC: 10 MMOL/L — SIGNIFICANT CHANGE UP (ref 7–14)
BUN SERPL-MCNC: 14 MG/DL — SIGNIFICANT CHANGE UP (ref 7–23)
CALCIUM SERPL-MCNC: 8.7 MG/DL — SIGNIFICANT CHANGE UP (ref 8.4–10.5)
CHLORIDE SERPL-SCNC: 103 MMOL/L — SIGNIFICANT CHANGE UP (ref 98–107)
CO2 SERPL-SCNC: 25 MMOL/L — SIGNIFICANT CHANGE UP (ref 22–31)
CREAT SERPL-MCNC: 0.68 MG/DL — SIGNIFICANT CHANGE UP (ref 0.5–1.3)
EGFR: 115 ML/MIN/1.73M2 — SIGNIFICANT CHANGE UP
GLUCOSE SERPL-MCNC: 102 MG/DL — HIGH (ref 70–99)
HCT VFR BLD CALC: 31.4 % — LOW (ref 34.5–45)
HGB BLD-MCNC: 10.5 G/DL — LOW (ref 11.5–15.5)
MAGNESIUM SERPL-MCNC: 1.9 MG/DL — SIGNIFICANT CHANGE UP (ref 1.6–2.6)
MCHC RBC-ENTMCNC: 31.9 PG — SIGNIFICANT CHANGE UP (ref 27–34)
MCHC RBC-ENTMCNC: 33.4 GM/DL — SIGNIFICANT CHANGE UP (ref 32–36)
MCV RBC AUTO: 95.4 FL — SIGNIFICANT CHANGE UP (ref 80–100)
NRBC # BLD: 0 /100 WBCS — SIGNIFICANT CHANGE UP (ref 0–0)
NRBC # FLD: 0 K/UL — SIGNIFICANT CHANGE UP (ref 0–0)
PHOSPHATE SERPL-MCNC: 3.5 MG/DL — SIGNIFICANT CHANGE UP (ref 2.5–4.5)
PLATELET # BLD AUTO: 108 K/UL — LOW (ref 150–400)
POTASSIUM SERPL-MCNC: 4.1 MMOL/L — SIGNIFICANT CHANGE UP (ref 3.5–5.3)
POTASSIUM SERPL-SCNC: 4.1 MMOL/L — SIGNIFICANT CHANGE UP (ref 3.5–5.3)
RBC # BLD: 3.29 M/UL — LOW (ref 3.8–5.2)
RBC # FLD: 14.6 % — HIGH (ref 10.3–14.5)
SODIUM SERPL-SCNC: 138 MMOL/L — SIGNIFICANT CHANGE UP (ref 135–145)
WBC # BLD: 5.26 K/UL — SIGNIFICANT CHANGE UP (ref 3.8–10.5)
WBC # FLD AUTO: 5.26 K/UL — SIGNIFICANT CHANGE UP (ref 3.8–10.5)

## 2024-09-16 PROCEDURE — 99232 SBSQ HOSP IP/OBS MODERATE 35: CPT

## 2024-09-16 RX ORDER — CHLORHEXIDINE GLUCONATE 40 MG/ML
1 SOLUTION TOPICAL
Refills: 0 | Status: DISCONTINUED | OUTPATIENT
Start: 2024-09-16 | End: 2024-09-19

## 2024-09-16 RX ORDER — DIPHENHYDRAMINE HCL 50 MG
25 CAPSULE ORAL ONCE
Refills: 0 | Status: COMPLETED | OUTPATIENT
Start: 2024-09-16 | End: 2024-09-16

## 2024-09-16 RX ORDER — CYCLOBENZAPRINE HCL 10 MG
5 TABLET ORAL ONCE
Refills: 0 | Status: COMPLETED | OUTPATIENT
Start: 2024-09-16 | End: 2024-09-16

## 2024-09-16 RX ADMIN — Medication 100 MICROGRAM(S): at 05:20

## 2024-09-16 RX ADMIN — SUCRALFATE 1 GRAM(S): 1 SUSPENSION ORAL at 05:20

## 2024-09-16 RX ADMIN — Medication 80 MILLIGRAM(S): at 21:04

## 2024-09-16 RX ADMIN — RISPERIDONE 3 MILLIGRAM(S): 0.25 TABLET, FILM COATED ORAL at 21:04

## 2024-09-16 RX ADMIN — Medication 25 MILLIGRAM(S): at 12:44

## 2024-09-16 RX ADMIN — Medication 25 MILLIGRAM(S): at 09:08

## 2024-09-16 RX ADMIN — ESCITALOPRAM OXALATE 20 MILLIGRAM(S): 10 TABLET ORAL at 11:31

## 2024-09-16 RX ADMIN — Medication 5 MILLIGRAM(S): at 13:40

## 2024-09-16 RX ADMIN — Medication 50 MILLIGRAM(S): at 21:04

## 2024-09-16 RX ADMIN — NALTREXONE HYDROCHLORIDE 50 MILLIGRAM(S): 50 TABLET, FILM COATED ORAL at 11:32

## 2024-09-16 RX ADMIN — Medication 0.1 MILLIGRAM(S): at 21:04

## 2024-09-16 RX ADMIN — Medication 5 MILLIGRAM(S): at 21:22

## 2024-09-16 RX ADMIN — Medication 25 MILLIGRAM(S): at 11:32

## 2024-09-16 RX ADMIN — Medication 200 MILLIGRAM(S): at 21:04

## 2024-09-16 RX ADMIN — Medication 3 MILLIGRAM(S): at 21:04

## 2024-09-16 RX ADMIN — DIVALPROEX SODIUM 750 MILLIGRAM(S): 125 CAPSULE, DELAYED RELEASE ORAL at 09:09

## 2024-09-16 RX ADMIN — CHLORHEXIDINE GLUCONATE 1 APPLICATION(S): 40 SOLUTION TOPICAL at 11:32

## 2024-09-16 RX ADMIN — APIXABAN 5 MILLIGRAM(S): 5 TABLET, FILM COATED ORAL at 17:29

## 2024-09-16 RX ADMIN — APIXABAN 5 MILLIGRAM(S): 5 TABLET, FILM COATED ORAL at 05:20

## 2024-09-16 RX ADMIN — DIVALPROEX SODIUM 750 MILLIGRAM(S): 125 CAPSULE, DELAYED RELEASE ORAL at 17:29

## 2024-09-16 RX ADMIN — SUCRALFATE 1 GRAM(S): 1 SUSPENSION ORAL at 17:29

## 2024-09-16 RX ADMIN — Medication 5 MILLIGRAM(S): at 11:32

## 2024-09-16 NOTE — ED PROVIDER NOTE - ATTESTATION, MLM
3 = A little assistance
I have reviewed and confirmed nurses' notes for patient's medications, allergies, medical history, and surgical history.

## 2024-09-16 NOTE — CHART NOTE - NSCHARTNOTEFT_GEN_A_CORE
NEUROLOGY FOLLOW-UP:    Chart reviewed during rounds. Pertinent results as below -     < from: MR Head No Cont (09.12.24 @ 21:41) >  IMPRESSION: No acute infarction.  < end of copied text >    < from: MR Cervical Spine w/wo IV Cont (09.15.24 @ 14:15) >  IMPRESSION: No MR imaging evidence of demyelinating disease. No evidence   of cord or conus medullaris or cauda equina compression. No abnormal   intrathecal enhancement. Degenerative disc disease and disc herniation at the C5-C6 level.    7 mm pituitary gland lesion for which the differential diagnosis includes   an adenoma or complex Rathke's cleft cyst. A dedicated contrast enhanced   MRI study of the pituitary gland can be obtained.  < end of copied text >      Impression: Left lower extremity motor and sensory deficit with improvement. Low suspicion for acute infarction, demyelinating disease and peripheral etiology. Also, low concern for spinal pathology. Possibly functional etiology.     Recommendations:   - No further inpatient neurological work-up indicated at this time  - Continue home Eliquis 5mg BID  - Pt should follow-up with NSGY after obtaining MRI brain wwo contrast outpatient for pituitary lesion  - Pt should also follow-up with behavioral health service outpatient  - Patient can follow up with general neurology at [address] [place] [address] 1-2 weeks after discharge. Please instruct the patient to call [phone] to schedule this appointment.  - Neurology signing off. Please call [phone] with any questions. Thank you.     Raiza Fofana   PGY-4  Department of Neurology  [facility]

## 2024-09-16 NOTE — ED PROVIDER NOTE - CROS ED ENMT ALL NEG
Mohs Case Number: [id number] Date Of Previous Biopsy (Optional): 7/17/24 Previous Accession (Optional): [id number] Biopsy Photograph Reviewed: Yes Referring Physician (Optional): Clover Acosta Consent Type: Consent 1 (Standard) Eye Shield Used: No Initial Size Of Lesion: 1.1 X Size Of Lesion In Cm (Optional): 1 Primary Defect Length In Cm (Final Defect Size - Required For Flaps/Grafts): 1.7 Primary Defect Width In Cm (Final Defect Size - Required For Flaps/Grafts): 1.5 Primary Defect Depth In Cm (Optional But Required For Some Insurers): 0 Repair Type: No repair - secondary intention Which Instrument Did You Use For Dermabrasion?: Wire Brush Which Eyelid Repair Cpt Are You Using?: 21971 Oculoplastic Surgeon Procedure Text (A): After obtaining clear surgical margins the patient was sent to oculoplastics for surgical repair.  The patient understands they will receive post-surgical care and follow-up from the referring physician's office. Otolaryngologist Procedure Text (A): After obtaining clear surgical margins the patient was sent to otolaryngology for surgical repair.  The patient understands they will receive post-surgical care and follow-up from the referring physician's office. Plastic Surgeon Procedure Text (A): After obtaining clear surgical margins the patient was sent to plastics for surgical repair.  The patient understands they will receive post-surgical care and follow-up from the referring physician's office. Mid-Level Procedure Text (A): After obtaining clear surgical margins the patient was sent to a mid-level provider for surgical repair.  The patient understands they will receive post-surgical care and follow-up from the mid-level provider. Provider Procedure Text (A): After obtaining clear surgical margins the defect was repaired by another provider. Asc Procedure Text (A): After obtaining clear surgical margins the patient was sent to an ASC for surgical repair.  The patient understands they will receive post-surgical care and follow-up from the ASC physician. Suturegard Retention Suture: 2-0 Nylon Retention Suture Bite Size: 3 mm Length To Time In Minutes Device Was In Place: 10 Undermining Type: Entire Wound Debridement Text: The wound edges were debrided prior to proceeding with the closure to facilitate wound healing. Helical Rim Text: The closure involved the helical rim. Vermilion Border Text: The closure involved the vermilion border. Nostril Rim Text: The closure involved the nostril rim. Retention Suture Text: Retention sutures were placed to support the closure and prevent dehiscence. Location Indication Override (Is Already Calculated Based On Selected Body Location): Area M Area H Indication Text: Tumors in this location are included in Area H (eyelids, eyebrows, nose, lips, chin, ear, pre-auricular, post-auricular, temple, genitalia, hands, feet, ankles and areola).  Tissue conservation is critical in these anatomic locations. Area M Indication Text: Tumors in this location are included in Area M (cheek, forehead, scalp, neck, jawline and pretibial skin).  Mohs surgery is indicated for tumors in these anatomic locations. Area L Indication Text: Tumors in this location are included in Area L (trunk and extremities).  Mohs surgery is indicated for larger tumors, or tumors with aggressive histologic features, in these anatomic locations. Tumor Debulked?: curette Depth Of Tumor Invasion (For Histology): tumor not visualized (deep and peripheral margins are clear of tumor) Perineural Invasion (For Histology - Be Specific If Possible): absent Special Stains Stage 1 - Results: Base On Clearance Noted Above Stage 2: Additional Anesthesia Type: 2% lidocaine with epinephrine and a 1:10 solution of 8.4% sodium bicarbonate Stage 3: Additional Anesthesia Type: 2% lidocaine with epinephrine Staging Info: By selecting yes to the question above you will include information on AJCC 8 tumor staging in your Mohs note. Information on tumor staging will be automatically added for SCCs on the head and neck. AJCC 8 includes tumor size, tumor depth, perineural involvement and bone invasion. Tumor Depth: Less than 6mm from granular layer and no invasion beyond the subcutaneous fat Was The Patient On Physician Recommended Anticoagulation Therapy?: Please Select the Appropriate Response Medical Necessity Statement: Based on my medical judgement, standard excision and/or destruction technique methods are not clinically sufficient treatment options  .  To prevent the risk of compromising surgical cure and reconstruction; prompt microscopic examination of the surgical margins is necessary.  Based on the given indication(s), maximum conservation of healthy tissue, and high cure rate, Mohs surgery is the most appropriate treatment for this cancer. Alternatives Discussed Intro (Do Not Add Period): I discussed alternative treatments to Mohs surgery and specifically discussed the risks and benefits of Consent 1/Introductory Paragraph: Various treatment options for skin cancer treatment; including but not limited to no treatment, cryosurgery or cryotherapy, excision, radiation therapy, electrodessication and curettage, topical therapeutic agents and light therapy were discussed in depth with the patient.  The rationale for Mohs was explained to the patient and consent was obtained. The risks, benefits and alternatives to therapy were discussed in detail. Specifically, the risks of infection, scarring, bleeding, prolonged wound healing, incomplete removal, allergy to anesthesia, nerve injury and recurrence were addressed. Prior to the procedure, the treatment site was clearly identified and confirmed by the patient and the patient agreed that Mohs surgery is the best treatment option for their lesion. No guarantees were made or implied; close follow-up was stressed out to the patient.  All components of Universal Protocol/PAUSE Rule completed. Consent 2/Introductory Paragraph: Mohs surgery was explained to the patient and consent was obtained. The risks, benefits and alternatives to therapy were discussed in detail. Specifically, the risks of infection, scarring, bleeding, prolonged wound healing, incomplete removal, allergy to anesthesia, nerve injury and recurrence were addressed. Prior to the procedure, the treatment site was clearly identified and confirmed by the patient. All components of Universal Protocol/PAUSE Rule completed. Consent 3/Introductory Paragraph: I gave the patient a chance to ask questions they had about the procedure.  Following this I explained the Mohs procedure and consent was obtained. The risks, benefits and alternatives to therapy were discussed in detail. Specifically, the risks of infection, scarring, bleeding, prolonged wound healing, incomplete removal, allergy to anesthesia, nerve injury and recurrence were addressed. Prior to the procedure, the treatment site was clearly identified and confirmed by the patient. All components of Universal Protocol/PAUSE Rule completed. Consent (Temporal Branch)/Introductory Paragraph: The rationale for Mohs was explained to the patient and consent was obtained. The risks, benefits and alternatives to therapy were discussed in detail. Specifically, the risks of damage to the temporal branch of the facial nerve, infection, scarring, bleeding, prolonged wound healing, incomplete removal, allergy to anesthesia, and recurrence were addressed. Prior to the procedure, the treatment site was clearly identified and confirmed by the patient. All components of Universal Protocol/PAUSE Rule completed. Consent (Marginal Mandibular)/Introductory Paragraph: The rationale for Mohs was explained to the patient and consent was obtained. The risks, benefits and alternatives to therapy were discussed in detail. Specifically, the risks of damage to the marginal mandibular branch of the facial nerve, infection, scarring, bleeding, prolonged wound healing, incomplete removal, allergy to anesthesia, and recurrence were addressed. Prior to the procedure, the treatment site was clearly identified and confirmed by the patient. All components of Universal Protocol/PAUSE Rule completed. Consent (Spinal Accessory)/Introductory Paragraph: The rationale for Mohs was explained to the patient and consent was obtained. The risks, benefits and alternatives to therapy were discussed in detail. Specifically, the risks of damage to the spinal accessory nerve, infection, scarring, bleeding, prolonged wound healing, incomplete removal, allergy to anesthesia, and recurrence were addressed. Prior to the procedure, the treatment site was clearly identified and confirmed by the patient. All components of Universal Protocol/PAUSE Rule completed. Consent (Near Eyelid Margin)/Introductory Paragraph: The rationale for Mohs was explained to the patient and consent was obtained. The risks, benefits and alternatives to therapy were discussed in detail. Specifically, the risks of ectropion or eyelid deformity, infection, scarring, bleeding, prolonged wound healing, incomplete removal, allergy to anesthesia, nerve injury and recurrence were addressed. Prior to the procedure, the treatment site was clearly identified and confirmed by the patient. All components of Universal Protocol/PAUSE Rule completed. Consent (Ear)/Introductory Paragraph: The rationale for Mohs was explained to the patient and consent was obtained. The risks, benefits and alternatives to therapy were discussed in detail. Specifically, the risks of ear deformity, infection, scarring, bleeding, prolonged wound healing, incomplete removal, allergy to anesthesia, nerve injury and recurrence were addressed. Prior to the procedure, the treatment site was clearly identified and confirmed by the patient. All components of Universal Protocol/PAUSE Rule completed. Consent (Nose)/Introductory Paragraph: The rationale for Mohs was explained to the patient and consent was obtained. The risks, benefits and alternatives to therapy were discussed in detail. Specifically, the risks of nasal deformity, changes in the flow of air through the nose, infection, scarring, bleeding, prolonged wound healing, incomplete removal, allergy to anesthesia, nerve injury and recurrence were addressed. Prior to the procedure, the treatment site was clearly identified and confirmed by the patient. All components of Universal Protocol/PAUSE Rule completed. Consent (Lip)/Introductory Paragraph: The rationale for Mohs was explained to the patient and consent was obtained. The risks, benefits and alternatives to therapy were discussed in detail. Specifically, the risks of lip deformity, changes in the oral aperture, infection, scarring, bleeding, prolonged wound healing, incomplete removal, allergy to anesthesia, nerve injury and recurrence were addressed. Prior to the procedure, the treatment site was clearly identified and confirmed by the patient. All components of Universal Protocol/PAUSE Rule completed. Consent (Scalp)/Introductory Paragraph: The rationale for Mohs was explained to the patient and consent was obtained. The risks, benefits and alternatives to therapy were discussed in detail. Specifically, the risks of changes in hair growth pattern secondary to repair, infection, scarring, bleeding, prolonged wound healing, incomplete removal, allergy to anesthesia, nerve injury and recurrence were addressed. Prior to the procedure, the treatment site was clearly identified and confirmed by the patient. All components of Universal Protocol/PAUSE Rule completed. Detail Level: Detailed Postop Diagnosis: same Surgeon: Jaciel Vaughn MD Anesthesia Volume In Cc: 3 Hemostasis: Electrocautery Estimated Blood Loss (Cc): minimal Stage 14: Additional Anesthesia Type: 1% lidocaine with epinephrine Anesthesia Volume In Cc: 6 Brow Lift Text: A midfrontal incision was made medially to the defect to allow access to the tissues just superior to the left eyebrow. Following careful dissection inferiorly in a supraperiosteal plane to the level of the left eyebrow, several 3-0 monocryl sutures were used to resuspend the eyebrow orbicularis oculi muscular unit to the superior frontal bone periosteum. This resulted in an appropriate reapproximation of static eyebrow symmetry and correction of the left brow ptosis. Epidermal Closure: none-secondary intention Suturegard Intro: Intraoperative tissue expansion was performed, utilizing the SUTUREGARD device, in order to reduce wound tension. Suturegard Body: The suture ends were repeatedly re-tightened and re-clamped to achieve the desired tissue expansion. Hemigard Intro: Due to skin fragility and wound tension, it was decided to use HEMIGARD adhesive retention suture devices to permit a linear closure. The skin was cleaned and dried for a 6cm distance away from the wound. Excessive hair, if present, was removed to allow for adhesion. Hemigard Postcare Instructions: The HEMIGARD strips are to remain completely dry for at least 5-7 days. Donor Site Anesthesia Type: same as repair anesthesia Closure 2 Information: This tab is for additional flaps and grafts, including complex repair and grafts and complex repair and flaps. You can also specify a different location for the additional defect, if the location is the same you do not need to select a new one. We will insert the automated text for the repair you select below just as we do for solitary flaps and grafts. Please note that at this time if you select a location with a different insurance zone you will need to override the ICD10 and CPT if appropriate. Closure 3 Information: This tab is for additional flaps and grafts above and beyond our usual structured repairs.  Please note if you enter information here it will not currently bill and you will need to add the billing information manually. Wound Care: Vaseline Dressing: dry sterile dressing Wound Care (No Sutures): Petrolatum Unna Boot Text: An Unna boot was placed to help immobilize the limb and facilitate more rapid healing. negative... Home Suture Removal Text: Patient was provided instructions on removing sutures and will remove their sutures at home.  If they have any questions or difficulties they will call the office. Post-Care Instructions: I reviewed with the patient in detail, both written and verbal, post-care instructions. Patient is not to engage in any heavy lifting, exercise, or swimming for the next 14 days. Should the patient develop any fevers, chills, bleeding, severe pain patient will contact the office immediately. Patient verbalized understanding. Pain Refusal Text: I offered to prescribe pain medication but the patient refused to take this medication. Mauc Instructions: By selecting yes to the question below the MAUC number will be added into the note.  This will be calculated automatically based on the diagnosis chosen, the size entered, the body zone selected (H,M,L) and the specific indications you chose. You will also have the option to override the Mohs AUC if you disagree with the automatically calculated number and this option is found in the Case Summary tab. Where Do You Want The Question To Include Opioid Counseling Located?: Case Summary Tab Eye Protection Verbiage: Before proceeding with the stage, a plastic scleral shield was inserted. The globe was anesthetized with a few drops of 1% lidocaine with 1:100,000 epinephrine. Then, an appropriate sized scleral shield was chosen and coated with lacrilube ointment. The shield was gently inserted and left in place for the duration of each stage. After the stage was completed, the shield was gently removed. Mohs Method Verbiage: An incision at a 45 degree angle following the standard Mohs approach was done and the specimen was harvested as a microscopic controlled layer. Surgeon/Pathologist Verbiage (Will Incorporate Name Of Surgeon From Intro If Not Blank): operated in two distinct and integrated capacities as the surgeon and pathologist. Mohs Histo Method Verbiage: Each section was then chromacoded and processed in the Mohs lab using the Mohs protocol and submitted for frozen section. Subsequent Stages Histo Method Verbiage: Using a similar technique to that described above, a thin layer of tissue was removed from all areas where tumor was visible on the previous stage.  The tissue was again oriented, mapped, dyed, and processed as above. Mohs Rapid Report Verbiage: The area of clinically evident tumor was marked with skin marking ink and appropriately hatched.  The initial incision was made following the Mohs approach through the skin.  The specimen was taken to the lab, divided into the necessary number of pieces, chromacoded and processed according to the Mohs protocol.  This was repeated in successive stages until a tumor free defect was achieved. Complex Repair Preamble Text (Leave Blank If You Do Not Want): Extensive wide undermining was performed. Intermediate Repair Preamble Text (Leave Blank If You Do Not Want): Undermining was performed with blunt dissection. Graft Cartilage Fenestration Text: The cartilage was fenestrated with a 2mm punch biopsy to help facilitate graft survival and healing. Non-Graft Cartilage Fenestration Text: The cartilage was fenestrated with a 2mm punch biopsy to help facilitate healing. Secondary Intention Text (Leave Blank If You Do Not Want): The defect will heal with secondary intention. No Repair - Repaired With Adjacent Surgical Defect Text (Leave Blank If You Do Not Want): After obtaining clear surgical margins the defect was repaired concurrently with another surgical defect which was in close approximation. Adjacent Tissue Transfer Text: The defect edges were debeveled with a #15 scalpel blade.  Given the location of the defect and the proximity to free margins an adjacent tissue transfer was deemed most appropriate.  Using a sterile surgical marker, an appropriate flap was drawn incorporating the defect and placing the expected incisions within the relaxed skin tension lines where possible.    The area thus outlined was incised deep to adipose tissue with a #15 scalpel blade.  The skin margins were undermined to an appropriate distance in all directions utilizing iris scissors. Advancement Flap (Single) Text: The defect edges were debeveled with a #15 scalpel blade.  Given the location of the defect and the proximity to free margins a single advancement flap was deemed most appropriate.  Using a sterile surgical marker, an appropriate advancement flap was drawn incorporating the defect and placing the expected incisions within the relaxed skin tension lines where possible.    The area thus outlined was incised deep to adipose tissue with a #15 scalpel blade.  The skin margins were undermined to an appropriate distance in all directions utilizing iris scissors. Advancement Flap (Double) Text: The defect edges were debeveled with a #15 scalpel blade.  Given the location of the defect and the proximity to free margins a double advancement flap was deemed most appropriate.  Using a sterile surgical marker, the appropriate advancement flaps were drawn incorporating the defect and placing the expected incisions within the relaxed skin tension lines where possible.    The area thus outlined was incised deep to adipose tissue with a #15 scalpel blade.  The skin margins were undermined to an appropriate distance in all directions utilizing iris scissors. Advancement-Rotation Flap Text: The defect edges were debeveled with a #15 scalpel blade.  Given the location of the defect, shape of the defect and the proximity to free margins an advancement-rotation flap was deemed most appropriate.  Using a sterile surgical marker, an appropriate flap was drawn incorporating the defect and placing the expected incisions within the relaxed skin tension lines where possible. The area thus outlined was incised deep to adipose tissue with a #15 scalpel blade.  The skin margins were undermined to an appropriate distance in all directions utilizing iris scissors. Alar Island Pedicle Flap Text: The defect edges were debeveled with a #15 scalpel blade.  Given the location of the defect, shape of the defect and the proximity to the alar rim an island pedicle advancement flap was deemed most appropriate.  Using a sterile surgical marker, an appropriate advancement flap was drawn incorporating the defect, outlining the appropriate donor tissue and placing the expected incisions within the nasal ala running parallel to the alar rim. The area thus outlined was incised with a #15 scalpel blade.  The skin margins were undermined minimally to an appropriate distance in all directions around the primary defect and laterally outward around the island pedicle utilizing iris scissors.  There was minimal undermining beneath the pedicle flap. A-T Advancement Flap Text: The defect edges were debeveled with a #15 scalpel blade.  Given the location of the defect, shape of the defect and the proximity to free margins an A-T advancement flap was deemed most appropriate.  Using a sterile surgical marker, an appropriate advancement flap was drawn incorporating the defect and placing the expected incisions within the relaxed skin tension lines where possible.    The area thus outlined was incised deep to adipose tissue with a #15 scalpel blade.  The skin margins were undermined to an appropriate distance in all directions utilizing iris scissors. Banner Transposition Flap Text: The defect edges were debeveled with a #15 scalpel blade.  Given the location of the defect and the proximity to free margins a Banner transposition flap was deemed most appropriate.  Using a sterile surgical marker, an appropriate flap drawn around the defect. The area thus outlined was incised deep to adipose tissue with a #15 scalpel blade.  The skin margins were undermined to an appropriate distance in all directions utilizing iris scissors. Bilateral Helical Rim Advancement Flap Text: The defect edges were debeveled with a #15 blade scalpel.  Given the location of the defect and the proximity to free margins (helical rim) a bilateral helical rim advancement flap was deemed most appropriate.  Using a sterile surgical marker, the appropriate advancement flaps were drawn incorporating the defect and placing the expected incisions between the helical rim and antihelix where possible.  The area thus outlined was incised through and through with a #15 scalpel blade.  With a skin hook and iris scissors, the flaps were gently and sharply undermined and freed up. Bilateral Rotation Flap Text: The defect edges were debeveled with a #15 scalpel blade. Given the location of the defect, shape of the defect and the proximity to free margins a bilateral rotation flap was deemed most appropriate. Using a sterile surgical marker, an appropriate rotation flap was drawn incorporating the defect and placing the expected incisions within the relaxed skin tension lines where possible. The area thus outlined was incised deep to adipose tissue with a #15 scalpel blade. The skin margins were undermined to an appropriate distance in all directions utilizing iris scissors. Following this, the designed flap was carried over into the primary defect and sutured into place. Bilobed Flap Text: The defect edges were debeveled with a #15 scalpel blade.  Given the location of the defect and the proximity to free margins a bilobe flap was deemed most appropriate.  Using a sterile surgical marker, an appropriate bilobe flap drawn around the defect.    The area thus outlined was incised deep to adipose tissue with a #15 scalpel blade.  The skin margins were undermined to an appropriate distance in all directions utilizing iris scissors. Bilobed Transposition Flap Text: The defect edges were debeveled with a #15 scalpel blade.  Given the location of the defect and the proximity to free margins a bilobed transposition flap was deemed most appropriate.  Using a sterile surgical marker, an appropriate bilobe flap drawn around the defect.    The area thus outlined was incised deep to adipose tissue with a #15 scalpel blade.  The skin margins were undermined to an appropriate distance in all directions utilizing iris scissors. Bi-Rhombic Flap Text: The defect edges were debeveled with a #15 scalpel blade.  Given the location of the defect and the proximity to free margins a bi-rhombic flap was deemed most appropriate.  Using a sterile surgical marker, an appropriate rhombic flap was drawn incorporating the defect. The area thus outlined was incised deep to adipose tissue with a #15 scalpel blade.  The skin margins were undermined to an appropriate distance in all directions utilizing iris scissors. Burow's Advancement Flap Text: The defect edges were debeveled with a #15 scalpel blade.  Given the location of the defect and the proximity to free margins a Burow's advancement flap was deemed most appropriate.  Using a sterile surgical marker, the appropriate advancement flap was drawn incorporating the defect and placing the expected incisions within the relaxed skin tension lines where possible.    The area thus outlined was incised deep to adipose tissue with a #15 scalpel blade.  The skin margins were undermined to an appropriate distance in all directions utilizing iris scissors. Chonodrocutaneous Helical Advancement Flap Text: The defect edges were debeveled with a #15 scalpel blade.  Given the location of the defect and the proximity to free margins a chondrocutaneous helical advancement flap was deemed most appropriate.  Using a sterile surgical marker, the appropriate advancement flap was drawn incorporating the defect and placing the expected incisions within the relaxed skin tension lines where possible.    The area thus outlined was incised deep to adipose tissue with a #15 scalpel blade.  The skin margins were undermined to an appropriate distance in all directions utilizing iris scissors. Crescentic Advancement Flap Text: The defect edges were debeveled with a #15 scalpel blade.  Given the location of the defect and the proximity to free margins a crescentic advancement flap was deemed most appropriate.  Using a sterile surgical marker, the appropriate advancement flap was drawn incorporating the defect and placing the expected incisions within the relaxed skin tension lines where possible.    The area thus outlined was incised deep to adipose tissue with a #15 scalpel blade.  The skin margins were undermined to an appropriate distance in all directions utilizing iris scissors. Dorsal Nasal Flap Text: The defect edges were debeveled with a #15 scalpel blade.  Given the location of the defect and the proximity to free margins a dorsal nasal flap was deemed most appropriate.  Using a sterile surgical marker, an appropriate dorsal nasal flap was drawn around the defect.    The area thus outlined was incised deep to adipose tissue with a #15 scalpel blade.  The skin margins were undermined to an appropriate distance in all directions utilizing iris scissors. Double Island Pedicle Flap Text: The defect edges were debeveled with a #15 scalpel blade.  Given the location of the defect, shape of the defect and the proximity to free margins a double island pedicle advancement flap was deemed most appropriate.  Using a sterile surgical marker, an appropriate advancement flap was drawn incorporating the defect, outlining the appropriate donor tissue and placing the expected incisions within the relaxed skin tension lines where possible.    The area thus outlined was incised deep to adipose tissue with a #15 scalpel blade.  The skin margins were undermined to an appropriate distance in all directions around the primary defect and laterally outward around the island pedicle utilizing iris scissors.  There was minimal undermining beneath the pedicle flap. Double O-Z Flap Text: The defect edges were debeveled with a #15 scalpel blade.  Given the location of the defect, shape of the defect and the proximity to free margins a Double O-Z flap was deemed most appropriate.  Using a sterile surgical marker, an appropriate transposition flap was drawn incorporating the defect and placing the expected incisions within the relaxed skin tension lines where possible. The area thus outlined was incised deep to adipose tissue with a #15 scalpel blade.  The skin margins were undermined to an appropriate distance in all directions utilizing iris scissors. Double O-Z Plasty Text: The defect edges were debeveled with a #15 scalpel blade.  Given the location of the defect, shape of the defect and the proximity to free margins a Double O-Z plasty (double transposition flap) was deemed most appropriate.  Using a sterile surgical marker, the appropriate transposition flaps were drawn incorporating the defect and placing the expected incisions within the relaxed skin tension lines where possible. The area thus outlined was incised deep to adipose tissue with a #15 scalpel blade.  The skin margins were undermined to an appropriate distance in all directions utilizing iris scissors.  Hemostasis was achieved with electrocautery.  The flaps were then transposed into place, one clockwise and the other counterclockwise, and anchored with interrupted buried subcutaneous sutures. Double Z Plasty Text: The lesion was extirpated to the level of the fat with a #15 scalpel blade. Given the location of the defect, shape of the defect and the proximity to free margins a double Z-plasty was deemed most appropriate for repair. Using a sterile surgical marker, the appropriate transposition arms of the double Z-plasty were drawn incorporating the defect and placing the expected incisions within the relaxed skin tension lines where possible. The area thus outlined was incised deep to adipose tissue with a #15 scalpel blade. The skin margins were undermined to an appropriate distance in all directions utilizing iris scissors. The opposing transposition arms were then transposed and carried over into place in opposite direction and anchored with interrupted buried subcutaneous sutures. Ear Star Wedge Flap Text: The defect edges were debeveled with a #15 blade scalpel.  Given the location of the defect and the proximity to free margins (helical rim) an ear star wedge flap was deemed most appropriate.  Using a sterile surgical marker, the appropriate flap was drawn incorporating the defect and placing the expected incisions between the helical rim and antihelix where possible.  The area thus outlined was incised through and through with a #15 scalpel blade. Flip-Flop Flap Text: The defect edges were debeveled with a #15 blade scalpel.  Given the location of the defect and the proximity to free margins a flip-flop flap was deemed most appropriate. Using a sterile surgical marker, the appropriate flap was drawn incorporating the defect and placing the expected incisions between the helical rim and antihelix where possible.  The area thus outlined was incised through and through with a #15 scalpel blade. Following this, the designed flap was carried over into the primary defect and sutured into place. Hatchet Flap Text: The defect edges were debeveled with a #15 scalpel blade.  Given the location of the defect, shape of the defect and the proximity to free margins a hatchet flap was deemed most appropriate.  Using a sterile surgical marker, an appropriate hatchet flap was drawn incorporating the defect and placing the expected incisions within the relaxed skin tension lines where possible.    The area thus outlined was incised deep to adipose tissue with a #15 scalpel blade.  The skin margins were undermined to an appropriate distance in all directions utilizing iris scissors. Helical Rim Advancement Flap Text: The defect edges were debeveled with a #15 blade scalpel.  Given the location of the defect and the proximity to free margins (helical rim) a double helical rim advancement flap was deemed most appropriate.  Using a sterile surgical marker, the appropriate advancement flaps were drawn incorporating the defect and placing the expected incisions between the helical rim and antihelix where possible.  The area thus outlined was incised through and through with a #15 scalpel blade.  With a skin hook and iris scissors, the flaps were gently and sharply undermined and freed up. H Plasty Text: Given the location of the defect, shape of the defect and the proximity to free margins a H-plasty was deemed most appropriate for repair.  Using a sterile surgical marker, the appropriate advancement arms of the H-plasty were drawn incorporating the defect and placing the expected incisions within the relaxed skin tension lines where possible. The area thus outlined was incised deep to adipose tissue with a #15 scalpel blade. The skin margins were undermined to an appropriate distance in all directions utilizing iris scissors.  The opposing advancement arms were then advanced into place in opposite direction and anchored with interrupted buried subcutaneous sutures. Island Pedicle Flap Text: The defect edges were debeveled with a #15 scalpel blade.  Given the location of the defect, shape of the defect and the proximity to free margins an island pedicle advancement flap was deemed most appropriate.  Using a sterile surgical marker, an appropriate advancement flap was drawn incorporating the defect, outlining the appropriate donor tissue and placing the expected incisions within the relaxed skin tension lines where possible.    The area thus outlined was incised deep to adipose tissue with a #15 scalpel blade.  The skin margins were undermined to an appropriate distance in all directions around the primary defect and laterally outward around the island pedicle utilizing iris scissors.  There was minimal undermining beneath the pedicle flap. Island Pedicle Flap With Canthal Suspension Text: The defect edges were debeveled with a #15 scalpel blade.  Given the location of the defect, shape of the defect and the proximity to free margins an island pedicle advancement flap was deemed most appropriate.  Using a sterile surgical marker, an appropriate advancement flap was drawn incorporating the defect, outlining the appropriate donor tissue and placing the expected incisions within the relaxed skin tension lines where possible. The area thus outlined was incised deep to adipose tissue with a #15 scalpel blade.  The skin margins were undermined to an appropriate distance in all directions around the primary defect and laterally outward around the island pedicle utilizing iris scissors.  There was minimal undermining beneath the pedicle flap. A suspension suture was placed in the canthal tendon to prevent tension and prevent ectropion. Island Pedicle Flap-Requiring Vessel Identification Text: The defect edges were debeveled with a #15 scalpel blade.  Given the location of the defect, shape of the defect and the proximity to free margins an island pedicle advancement flap was deemed most appropriate.  Using a sterile surgical marker, an appropriate advancement flap was drawn, based on the axial vessel mentioned above, incorporating the defect, outlining the appropriate donor tissue and placing the expected incisions within the relaxed skin tension lines where possible.    The area thus outlined was incised deep to adipose tissue with a #15 scalpel blade.  The skin margins were undermined to an appropriate distance in all directions around the primary defect and laterally outward around the island pedicle utilizing iris scissors.  There was minimal undermining beneath the pedicle flap. Keystone Flap Text: The defect edges were debeveled with a #15 scalpel blade.  Given the location of the defect, shape of the defect a keystone flap was deemed most appropriate.  Using a sterile surgical marker, an appropriate keystone flap was drawn incorporating the defect, outlining the appropriate donor tissue and placing the expected incisions within the relaxed skin tension lines where possible. The area thus outlined was incised deep to adipose tissue with a #15 scalpel blade.  The skin margins were undermined to an appropriate distance in all directions around the primary defect and laterally outward around the flap utilizing iris scissors. Melolabial Transposition Flap Text: The defect edges were debeveled with a #15 scalpel blade.  Given the location of the defect and the proximity to free margins a melolabial flap was deemed most appropriate.  Using a sterile surgical marker, an appropriate melolabial transposition flap was drawn incorporating the defect.    The area thus outlined was incised deep to adipose tissue with a #15 scalpel blade.  The skin margins were undermined to an appropriate distance in all directions utilizing iris scissors. Mercedes Flap Text: The defect edges were debeveled with a #15 scalpel blade.  Given the location of the defect, shape of the defect and the proximity to free margins a Mercedes flap was deemed most appropriate.  Using a sterile surgical marker, an appropriate advancement flap was drawn incorporating the defect and placing the expected incisions within the relaxed skin tension lines where possible. The area thus outlined was incised deep to adipose tissue with a #15 scalpel blade.  The skin margins were undermined to an appropriate distance in all directions utilizing iris scissors. Modified Advancement Flap Text: The defect edges were debeveled with a #15 scalpel blade.  Given the location of the defect, shape of the defect and the proximity to free margins a modified advancement flap was deemed most appropriate.  Using a sterile surgical marker, an appropriate advancement flap was drawn incorporating the defect and placing the expected incisions within the relaxed skin tension lines where possible.    The area thus outlined was incised deep to adipose tissue with a #15 scalpel blade.  The skin margins were undermined to an appropriate distance in all directions utilizing iris scissors. Mucosal Advancement Flap Text: Given the location of the defect, shape of the defect and the proximity to free margins a mucosal advancement flap was deemed most appropriate. Incisions were made with a 15 blade scalpel in the appropriate fashion along the cutaneous vermilion border and the mucosal lip. The remaining actinically damaged mucosal tissue was excised.  The mucosal advancement flap was then elevated to the gingival sulcus with care taken to preserve the neurovascular structures and advanced into the primary defect. Care was taken to ensure that precise realignment of the vermilion border was achieved. Muscle Hinge Flap Text: The defect edges were debeveled with a #15 scalpel blade.  Given the size, depth and location of the defect and the proximity to free margins a muscle hinge flap was deemed most appropriate.  Using a sterile surgical marker, an appropriate hinge flap was drawn incorporating the defect. The area thus outlined was incised with a #15 scalpel blade.  The skin margins were undermined to an appropriate distance in all directions utilizing iris scissors. Mustarde Flap Text: The defect edges were debeveled with a #15 scalpel blade.  Given the size, depth and location of the defect and the proximity to free margins a Mustarde flap was deemed most appropriate.  Using a sterile surgical marker, an appropriate flap was drawn incorporating the defect. The area thus outlined was incised with a #15 scalpel blade.  The skin margins were undermined to an appropriate distance in all directions utilizing iris scissors. Nasal Turnover Hinge Flap Text: The defect edges were debeveled with a #15 scalpel blade.  Given the size, depth, location of the defect and the defect being full thickness a nasal turnover hinge flap was deemed most appropriate.  Using a sterile surgical marker, an appropriate hinge flap was drawn incorporating the defect. The area thus outlined was incised with a #15 scalpel blade. The flap was designed to recreate the nasal mucosal lining and the alar rim. The skin margins were undermined to an appropriate distance in all directions utilizing iris scissors. Nasalis-Muscle-Based Myocutaneous Island Pedicle Flap Text: Using a #15 blade, an incision was made around the donor flap to the level of the nasalis muscle. Wide lateral undermining was then performed in both the subcutaneous plane above the nasalis muscle, and in a submuscular plane just above periosteum. This allowed the formation of a free nasalis muscle axial pedicle (based on the angular artery) which was still attached to the actual cutaneous flap, increasing its mobility and vascular viability. Hemostasis was obtained with pinpoint electrocoagulation. The flap was mobilized into position and the pivotal anchor points positioned and stabilized with buried interrupted sutures. Subcutaneous and dermal tissues were closed in a multilayered fashion with sutures. Tissue redundancies were excised, and the epidermal edges were apposed without significant tension and sutured with sutures. Nasalis Myocutaneous Flap Text: Using a #15 blade, an incision was made around the donor flap to the level of the nasalis muscle. Wide lateral undermining was then performed in both the subcutaneous plane above the nasalis muscle, and in a submuscular plane just above periosteum. This allowed the formation of a free nasalis muscle axial pedicle which was still attached to the actual cutaneous flap, increasing its mobility and vascular viability. Hemostasis was obtained with pinpoint electrocoagulation. The flap was mobilized into position and the pivotal anchor points positioned and stabilized with buried interrupted sutures. Subcutaneous and dermal tissues were closed in a multilayered fashion with sutures. Tissue redundancies were excised, and the epidermal edges were apposed without significant tension and sutured with sutures. Nasolabial Transposition Flap Text: The defect edges were debeveled with a #15 scalpel blade.  Given the size, depth and location of the defect and the proximity to free margins a nasolabial transposition flap was deemed most appropriate. Using a sterile surgical marker, an appropriate flap was drawn incorporating the defect. The area thus outlined was incised with a #15 scalpel blade. The skin margins were undermined to an appropriate distance in all directions utilizing iris scissors. Following this, the designed flap was carried into the primary defect and sutured into place. Orbicularis Oris Muscle Flap Text: The defect edges were debeveled with a #15 scalpel blade.  Given that the defect affected the competency of the oral sphincter an obicularis oris muscle flap was deemed most appropriate to restore this competency and normal muscle function.  Using a sterile surgical marker, an appropriate flap was drawn incorporating the defect. The area thus outlined was incised with a #15 scalpel blade. O-T Advancement Flap Text: The defect edges were debeveled with a #15 scalpel blade.  Given the location of the defect, shape of the defect and the proximity to free margins an O-T advancement flap was deemed most appropriate.  Using a sterile surgical marker, an appropriate advancement flap was drawn incorporating the defect and placing the expected incisions within the relaxed skin tension lines where possible.    The area thus outlined was incised deep to adipose tissue with a #15 scalpel blade.  The skin margins were undermined to an appropriate distance in all directions utilizing iris scissors. O-T Plasty Text: The defect edges were debeveled with a #15 scalpel blade.  Given the location of the defect, shape of the defect and the proximity to free margins an O-T plasty was deemed most appropriate.  Using a sterile surgical marker, an appropriate O-T plasty was drawn incorporating the defect and placing the expected incisions within the relaxed skin tension lines where possible.    The area thus outlined was incised deep to adipose tissue with a #15 scalpel blade.  The skin margins were undermined to an appropriate distance in all directions utilizing iris scissors. O-L Flap Text: The defect edges were debeveled with a #15 scalpel blade.  Given the location of the defect, shape of the defect and the proximity to free margins an O-L flap was deemed most appropriate.  Using a sterile surgical marker, an appropriate advancement flap was drawn incorporating the defect and placing the expected incisions within the relaxed skin tension lines where possible.    The area thus outlined was incised deep to adipose tissue with a #15 scalpel blade.  The skin margins were undermined to an appropriate distance in all directions utilizing iris scissors. O-Z Flap Text: The defect edges were debeveled with a #15 scalpel blade.  Given the location of the defect, shape of the defect and the proximity to free margins an O-Z flap was deemed most appropriate.  Using a sterile surgical marker, an appropriate transposition flap was drawn incorporating the defect and placing the expected incisions within the relaxed skin tension lines where possible. The area thus outlined was incised deep to adipose tissue with a #15 scalpel blade.  The skin margins were undermined to an appropriate distance in all directions utilizing iris scissors. O-Z Plasty Text: The defect edges were debeveled with a #15 scalpel blade.  Given the location of the defect, shape of the defect and the proximity to free margins an O-Z plasty (double transposition flap) was deemed most appropriate.  Using a sterile surgical marker, the appropriate transposition flaps were drawn incorporating the defect and placing the expected incisions within the relaxed skin tension lines where possible.    The area thus outlined was incised deep to adipose tissue with a #15 scalpel blade.  The skin margins were undermined to an appropriate distance in all directions utilizing iris scissors.  Hemostasis was achieved with electrocautery.  The flaps were then transposed into place, one clockwise and the other counterclockwise, and anchored with interrupted buried subcutaneous sutures. Peng Advancement Flap Text: The defect edges were debeveled with a #15 scalpel blade.  Given the location of the defect, shape of the defect and the proximity to free margins a Peng advancement flap was deemed most appropriate.  Using a sterile surgical marker, an appropriate advancement flap was drawn incorporating the defect and placing the expected incisions within the relaxed skin tension lines where possible. The area thus outlined was incised deep to adipose tissue with a #15 scalpel blade.  The skin margins were undermined to an appropriate distance in all directions utilizing iris scissors. Rectangular Flap Text: The defect edges were debeveled with a #15 scalpel blade. Given the location of the defect and the proximity to free margins a rectangular flap was deemed most appropriate. Using a sterile surgical marker, an appropriate rectangular flap was drawn incorporating the defect. The area thus outlined was incised deep to adipose tissue with a #15 scalpel blade. The skin margins were undermined to an appropriate distance in all directions utilizing iris scissors. Following this, the designed flap was carried over into the primary defect and sutured into place. Rhombic Flap Text: The defect edges were debeveled with a #15 scalpel blade.  Given the location of the defect and the proximity to free margins a rhombic flap was deemed most appropriate.  Using a sterile surgical marker, a rhombic flap was designed to displace tension to avoid disruption of anatomic structures and free margins.  Incisions were made in the flap design at a 90 degree angle with a #15 scalpel blade.  The skin margins were undermined to an appropriate distance in all directions utilizing iris scissors. The secondary defect was closed with dermal sutures which allowed the flap to be advanced into the primary defect.  The flap was then sutured into the primary defect. Rhomboid Transposition Flap Text: The defect edges were debeveled with a #15 scalpel blade.  Given the location of the defect and the proximity to free margins a rhomboid transposition flap was deemed most appropriate.  Using a sterile surgical marker, an appropriate rhomboid flap was drawn incorporating the defect.    The area thus outlined was incised deep to adipose tissue with a #15 scalpel blade.  The skin margins were undermined to an appropriate distance in all directions utilizing iris scissors. Rotation Flap Text: The defect edges were debeveled with a #15 scalpel blade.  Given the location of the defect, shape of the defect and the proximity to free margins a rotation flap was deemed most appropriate.  Using a sterile surgical marker, an appropriate rotation flap was drawn incorporating the defect and placing the expected incisions within the relaxed skin tension lines where possible.    The area thus outlined was incised deep to adipose tissue with a #15 scalpel blade.  The skin margins were undermined to an appropriate distance in all directions utilizing iris scissors. Spiral Flap Text: The defect edges were debeveled with a #15 scalpel blade.  Given the location of the defect, shape of the defect and the proximity to free margins a spiral flap was deemed most appropriate.  Using a sterile surgical marker, an appropriate rotation flap was drawn incorporating the defect and placing the expected incisions within the relaxed skin tension lines where possible. The area thus outlined was incised deep to adipose tissue with a #15 scalpel blade.  The skin margins were undermined to an appropriate distance in all directions utilizing iris scissors. Staged Advancement Flap Text: The defect edges were debeveled with a #15 scalpel blade.  Given the location of the defect, shape of the defect and the proximity to free margins a staged advancement flap was deemed most appropriate.  Using a sterile surgical marker, an appropriate advancement flap was drawn incorporating the defect and placing the expected incisions within the relaxed skin tension lines where possible. The area thus outlined was incised deep to adipose tissue with a #15 scalpel blade.  The skin margins were undermined to an appropriate distance in all directions utilizing iris scissors. Star Wedge Flap Text: The defect edges were debeveled with a #15 scalpel blade.  Given the location of the defect, shape of the defect and the proximity to free margins a star wedge flap was deemed most appropriate.  Using a sterile surgical marker, an appropriate rotation flap was drawn incorporating the defect and placing the expected incisions within the relaxed skin tension lines where possible. The area thus outlined was incised deep to adipose tissue with a #15 scalpel blade.  The skin margins were undermined to an appropriate distance in all directions utilizing iris scissors. Transposition Flap Text: The defect edges were debeveled with a #15 scalpel blade.  Given the location of the defect and the proximity to free margins a transposition flap was deemed most appropriate.  Using a sterile surgical marker, an appropriate transposition flap was drawn incorporating the defect.    The area thus outlined was incised deep to adipose tissue with a #15 scalpel blade.  The skin margins were undermined to an appropriate distance in all directions utilizing iris scissors. Trilobed Flap Text: The defect edges were debeveled with a #15 scalpel blade.  Given the location of the defect and the proximity to free margins a trilobed flap was deemed most appropriate.  Using a sterile surgical marker, an appropriate trilobed flap drawn around the defect.    The area thus outlined was incised deep to adipose tissue with a #15 scalpel blade.  The skin margins were undermined to an appropriate distance in all directions utilizing iris scissors. V-Y Flap Text: The defect edges were debeveled with a #15 scalpel blade.  Given the location of the defect, shape of the defect and the proximity to free margins a V-Y flap was deemed most appropriate.  Using a sterile surgical marker, an appropriate advancement flap was drawn incorporating the defect and placing the expected incisions within the relaxed skin tension lines where possible.    The area thus outlined was incised deep to adipose tissue with a #15 scalpel blade.  The skin margins were undermined to an appropriate distance in all directions utilizing iris scissors. V-Y Plasty Text: The defect edges were debeveled with a #15 scalpel blade.  Given the location of the defect, shape of the defect and the proximity to free margins an V-Y advancement flap was deemed most appropriate.  Using a sterile surgical marker, an appropriate advancement flap was drawn incorporating the defect and placing the expected incisions within the relaxed skin tension lines where possible.    The area thus outlined was incised deep to adipose tissue with a #15 scalpel blade.  The skin margins were undermined to an appropriate distance in all directions utilizing iris scissors. W Plasty Text: The lesion was extirpated to the level of the fat with a #15 scalpel blade.  Given the location of the defect, shape of the defect and the proximity to free margins a W-plasty was deemed most appropriate for repair.  Using a sterile surgical marker, the appropriate transposition arms of the W-plasty were drawn incorporating the defect and placing the expected incisions within the relaxed skin tension lines where possible.    The area thus outlined was incised deep to adipose tissue with a #15 scalpel blade.  The skin margins were undermined to an appropriate distance in all directions utilizing iris scissors.  The opposing transposition arms were then transposed into place in opposite direction and anchored with interrupted buried subcutaneous sutures. Z Plasty Text: The lesion was extirpated to the level of the fat with a #15 scalpel blade.  Given the location of the defect, shape of the defect and the proximity to free margins a Z-plasty was deemed most appropriate for repair.  Using a sterile surgical marker, the appropriate transposition arms of the Z-plasty were drawn incorporating the defect and placing the expected incisions within the relaxed skin tension lines where possible.    The area thus outlined was incised deep to adipose tissue with a #15 scalpel blade.  The skin margins were undermined to an appropriate distance in all directions utilizing iris scissors.  The opposing transposition arms were then transposed into place in opposite direction and anchored with interrupted buried subcutaneous sutures. Zygomaticofacial Flap Text: Given the location of the defect, shape of the defect and the proximity to free margins a zygomaticofacial flap was deemed most appropriate for repair.  Using a sterile surgical marker, the appropriate flap was drawn incorporating the defect and placing the expected incisions within the relaxed skin tension lines where possible. The area thus outlined was incised deep to adipose tissue with a #15 scalpel blade with preservation of a vascular pedicle.  The skin margins were undermined to an appropriate distance in all directions utilizing iris scissors.  The flap was then placed into the defect and anchored with interrupted buried subcutaneous sutures. Abbe Flap (Lower To Upper Lip) Text: The defect of the upper lip was assessed and measured.  Given the location and size of the defect, an Abbe flap was deemed most appropriate. Using a sterile surgical marker, an appropriate Abbe flap was measured and drawn on the lower lip. Local anesthesia was then infiltrated. A scalpel was then used to incise the upper lip through and through the skin, vermilion, muscle and mucosa, leaving the flap pedicled on the opposite side.  The flap was then rotated and transferred to the lower lip defect.  The flap was then sutured into place with a three layer technique, closing the orbicularis oris muscle layer with subcutaneous buried sutures, followed by a mucosal layer and an epidermal layer. Abbe Flap (Upper To Lower Lip) Text: The defect of the lower lip was assessed and measured.  Given the location and size of the defect, an Abbe flap was deemed most appropriate. Using a sterile surgical marker, an appropriate Abbe flap was measured and drawn on the upper lip. Local anesthesia was then infiltrated.  A scalpel was then used to incise the upper lip through and through the skin, vermilion, muscle and mucosa, leaving the flap pedicled on the opposite side.  The flap was then rotated and transferred to the lower lip defect.  The flap was then sutured into place with a three layer technique, closing the orbicularis oris muscle layer with subcutaneous buried sutures, followed by a mucosal layer and an epidermal layer. Cheek Interpolation Flap Text: A decision was made to reconstruct the defect utilizing an interpolation axial flap and a staged reconstruction.  A telfa template was made of the defect.  This telfa template was then used to outline the Cheek Interpolation flap.  The donor area for the pedicle flap was then injected with anesthesia.  The flap was excised through the skin and subcutaneous tissue down to the layer of the underlying musculature.  The interpolation flap was carefully excised within this deep plane to maintain its blood supply.  The edges of the donor site were undermined.   The donor site was closed in a primary fashion.  The pedicle was then rotated into position and sutured.  Once the tube was sutured into place, adequate blood supply was confirmed with blanching and refill.  The pedicle was then wrapped with xeroform gauze and dressed appropriately with a telfa and gauze bandage to ensure continued blood supply and protect the attached pedicle. Cheek-To-Nose Interpolation Flap Text: A decision was made to reconstruct the defect utilizing an interpolation axial flap and a staged reconstruction.  A telfa template was made of the defect.  This telfa template was then used to outline the Cheek-To-Nose Interpolation flap.  The donor area for the pedicle flap was then injected with anesthesia.  The flap was excised through the skin and subcutaneous tissue down to the layer of the underlying musculature.  The interpolation flap was carefully excised within this deep plane to maintain its blood supply.  The edges of the donor site were undermined.   The donor site was closed in a primary fashion.  The pedicle was then rotated into position and sutured.  Once the tube was sutured into place, adequate blood supply was confirmed with blanching and refill.  The pedicle was then wrapped with xeroform gauze and dressed appropriately with a telfa and gauze bandage to ensure continued blood supply and protect the attached pedicle. Estlander Flap (Lower To Upper Lip) Text: The defect of the lower lip was assessed and measured.  Given the location and size of the defect, an Estlander flap was deemed most appropriate. Using a sterile surgical marker, an appropriate Estlander flap was measured and drawn on the upper lip. Local anesthesia was then infiltrated. A scalpel was then used to incise the lateral aspect of the flap, through skin, muscle and mucosa, leaving the flap pedicled medially.  The flap was then rotated and positioned to fill the lower lip defect.  The flap was then sutured into place with a three layer technique, closing the orbicularis oris muscle layer with subcutaneous buried sutures, followed by a mucosal layer and an epidermal layer. Interpolation Flap Text: A decision was made to reconstruct the defect utilizing an interpolation axial flap and a staged reconstruction.  A telfa template was made of the defect.  This telfa template was then used to outline the interpolation flap.  The donor area for the pedicle flap was then injected with anesthesia.  The flap was excised through the skin and subcutaneous tissue down to the layer of the underlying musculature.  The interpolation flap was carefully excised within this deep plane to maintain its blood supply.  The edges of the donor site were undermined.   The donor site was closed in a primary fashion.  The pedicle was then rotated into position and sutured.  Once the tube was sutured into place, adequate blood supply was confirmed with blanching and refill.  The pedicle was then wrapped with xeroform gauze and dressed appropriately with a telfa and gauze bandage to ensure continued blood supply and protect the attached pedicle. Melolabial Interpolation Flap Text: A decision was made to reconstruct the defect utilizing an interpolation axial flap and a staged reconstruction.  A telfa template was made of the defect.  This telfa template was then used to outline the melolabial interpolation flap.  The donor area for the pedicle flap was then injected with anesthesia.  The flap was excised through the skin and subcutaneous tissue down to the layer of the underlying musculature.  The pedicle flap was carefully excised within this deep plane to maintain its blood supply.  The edges of the donor site were undermined.   The donor site was closed in a primary fashion.  The pedicle was then rotated into position and sutured.  Once the tube was sutured into place, adequate blood supply was confirmed with blanching and refill.  The pedicle was then wrapped with xeroform gauze and dressed appropriately with a telfa and gauze bandage to ensure continued blood supply and protect the attached pedicle. Mastoid Interpolation Flap Text: A decision was made to reconstruct the defect utilizing an interpolation axial flap and a staged reconstruction.  A telfa template was made of the defect.  This telfa template was then used to outline the mastoid interpolation flap.  The donor area for the pedicle flap was then injected with anesthesia.  The flap was excised through the skin and subcutaneous tissue down to the layer of the underlying musculature.  The pedicle flap was carefully excised within this deep plane to maintain its blood supply.  The edges of the donor site were undermined.   The donor site was closed in a primary fashion.  The pedicle was then rotated into position and sutured.  Once the tube was sutured into place, adequate blood supply was confirmed with blanching and refill.  The pedicle was then wrapped with xeroform gauze and dressed appropriately with a telfa and gauze bandage to ensure continued blood supply and protect the attached pedicle. Paramedian Forehead Flap Text: A decision was made to reconstruct the defect utilizing an interpolation axial flap and a staged reconstruction.  A telfa template was made of the defect.  This telfa template was then used to outline the paramedian forehead pedicle flap.  The donor area for the pedicle flap was then injected with anesthesia.  The flap was excised through the skin and subcutaneous tissue down to the layer of the underlying musculature.  The pedicle flap was carefully excised within this deep plane to maintain its blood supply.  The edges of the donor site were undermined.   The donor site was closed in a primary fashion.  The pedicle was then rotated into position and sutured.  Once the tube was sutured into place, adequate blood supply was confirmed with blanching and refill.  The pedicle was then wrapped with xeroform gauze and dressed appropriately with a telfa and gauze bandage to ensure continued blood supply and protect the attached pedicle. Posterior Auricular Interpolation Flap Text: A decision was made to reconstruct the defect utilizing an interpolation axial flap and a staged reconstruction.  A telfa template was made of the defect.  This telfa template was then used to outline the posterior auricular interpolation flap.  The donor area for the pedicle flap was then injected with anesthesia.  The flap was excised through the skin and subcutaneous tissue down to the layer of the underlying musculature.  The pedicle flap was carefully excised within this deep plane to maintain its blood supply.  The edges of the donor site were undermined.   The donor site was closed in a primary fashion.  The pedicle was then rotated into position and sutured.  Once the tube was sutured into place, adequate blood supply was confirmed with blanching and refill.  The pedicle was then wrapped with xeroform gauze and dressed appropriately with a telfa and gauze bandage to ensure continued blood supply and protect the attached pedicle. Cheiloplasty (Complex) Text: A decision was made to reconstruct the defect with a  cheiloplasty.  The defect was undermined extensively.  Additional obicularis oris muscle was excised with a 15 blade scalpel.  The defect was converted into a full thickness wedge to facilite a better cosmetic result.  Small vessels were then tied off with 5-0 monocyrl. The obicularis oris, superficial fascia, adipose and dermis were then reapproximated.  After the deeper layers were approximated the epidermis was reapproximated with particular care given to realign the vermilion border. Cheiloplasty (Less Than 50%) Text: A decision was made to reconstruct the defect with a  cheiloplasty.  The defect was undermined extensively.  Additional obicularis oris muscle was excised with a 15 blade scalpel.  The defect was converted into a full thickness wedge, of less than 50% of the vertical height of the lip, to facilite a better cosmetic result.  Small vessels were then tied off with 5-0 monocyrl. The obicularis oris, superficial fascia, adipose and dermis were then reapproximated.  After the deeper layers were approximated the epidermis was reapproximated with particular care given to realign the vermilion border. Ear Wedge Repair Text: A wedge excision was completed by carrying down an excision through the full thickness of the ear and cartilage with an inward facing Burow's triangle. The wound was then closed in a layered fashion. Full Thickness Lip Wedge Repair (Flap) Text: Given the location of the defect and the proximity to free margins a full thickness wedge repair was deemed most appropriate.  Using a sterile surgical marker, the appropriate repair was drawn incorporating the defect and placing the expected incisions perpendicular to the vermilion border.  The vermilion border was also meticulously outlined to ensure appropriate reapproximation during the repair.  The area thus outlined was incised through and through with a #15 scalpel blade.  The muscularis and dermis were reaproximated with deep sutures following hemostasis. Care was taken to realign the vermilion border before proceeding with the superficial closure.  Once the vermilion was realigned the superfical and mucosal closure was finished. Burow's Graft Text: The defect edges were debeveled with a #15 scalpel blade.  Given the location of the defect, shape of the defect, the proximity to free margins and the presence of a standing cone deformity a Burow's skin graft was deemed most appropriate. The standing cone was removed and this tissue was then trimmed to the shape of the primary defect. The adipose tissue was also removed until only dermis and epidermis were left.  The skin margins of the secondary defect were undermined to an appropriate distance in all directions utilizing iris scissors.  The secondary defect was closed with interrupted buried subcutaneous sutures.  The skin edges were then re-apposed with running  sutures.  The skin graft was then placed in the primary defect and oriented appropriately. Cartilage Graft Text: The defect edges were debeveled with a #15 scalpel blade.  Given the location of the defect, shape of the defect, the fact the defect involved a full thickness cartilage defect a cartilage graft was deemed most appropriate.  An appropriate donor site was identified, cleansed, and anesthetized. The cartilage graft was then harvested and transferred to the recipient site, oriented appropriately and then sutured into place.  The secondary defect was then repaired using a primary closure. Composite Graft Text: The defect edges were debeveled with a #15 scalpel blade.  Given the location of the defect, shape of the defect, the proximity to free margins and the fact the defect was full thickness a composite graft was deemed most appropriate.  The defect was outline and then transferred to the donor site.  A full thickness graft was then excised from the donor site. The graft was then placed in the primary defect, oriented appropriately and then sutured into place.  The secondary defect was then repaired using a primary closure. Epidermal Autograft Text: The defect edges were debeveled with a #15 scalpel blade.  Given the location of the defect, shape of the defect and the proximity to free margins an epidermal autograft was deemed most appropriate.  Using a sterile surgical marker, the primary defect shape was transferred to the donor site. The epidermal graft was then harvested.  The skin graft was then placed in the primary defect and oriented appropriately. Dermal Autograft Text: The defect edges were debeveled with a #15 scalpel blade.  Given the location of the defect, shape of the defect and the proximity to free margins a dermal autograft was deemed most appropriate.  Using a sterile surgical marker, the primary defect shape was transferred to the donor site. The area thus outlined was incised deep to adipose tissue with a #15 scalpel blade.  The harvested graft was then trimmed of adipose and epidermal tissue until only dermis was left.  The skin graft was then placed in the primary defect and oriented appropriately. Ftsg Text: The defect edges were debeveled with a #15 scalpel blade.  Given the location of the defect, shape of the defect and the proximity to free margins a full thickness skin graft was deemed most appropriate.  Using a sterile surgical marker, the primary defect shape was transferred to the donor site. The area thus outlined was incised deep to adipose tissue with a #15 scalpel blade.  The harvested graft was then trimmed of adipose tissue until only dermis and epidermis was left.  The skin margins of the secondary defect were undermined to an appropriate distance in all directions utilizing iris scissors.  The secondary defect was closed with interrupted buried subcutaneous sutures.  The skin edges were then re-apposed with running  sutures.  The skin graft was then placed in the primary defect and oriented appropriately. Pinch Graft Text: The defect edges were debeveled with a #15 scalpel blade. Given the location of the defect, shape of the defect and the proximity to free margins a pinch graft was deemed most appropriate. Using a sterile surgical marker, the primary defect shape was transferred to the donor site. The area thus outlined was incised deep to adipose tissue with a #15 scalpel blade.  The harvested graft was then trimmed of adipose tissue until only dermis and epidermis was left. The skin graft was then placed in the primary defect and oriented appropriately. Skin Substitute Text: The defect edges were debeveled with a #15 scalpel blade.  Given the location of the defect, shape of the defect and the proximity to free margins a skin substitute graft was deemed most appropriate.  The graft material was trimmed to fit the size of the defect. The graft was then placed in the primary defect and oriented appropriately. Split-Thickness Skin Graft Text: The defect edges were debeveled with a #15 scalpel blade.  Given the location of the defect, shape of the defect and the proximity to free margins a split thickness skin graft was deemed most appropriate.  Using a sterile surgical marker, the primary defect shape was transferred to the donor site. The split thickness graft was then harvested.  The skin graft was then placed in the primary defect and oriented appropriately. Tissue Cultured Epidermal Autograft Text: The defect edges were debeveled with a #15 scalpel blade.  Given the location of the defect, shape of the defect and the proximity to free margins a tissue cultured epidermal autograft was deemed most appropriate.  The graft was then trimmed to fit the size of the defect.  The graft was then placed in the primary defect and oriented appropriately. Xenograft Text: The defect edges were debeveled with a #15 scalpel blade.  Given the location of the defect, shape of the defect and the proximity to free margins a xenograft was deemed most appropriate.  The graft was then trimmed to fit the size of the defect.  The graft was then placed in the primary defect and oriented appropriately. Complex Repair And Flap Additional Text (Will Appearing After The Standard Complex Repair Text): The complex repair was not sufficient to completely close the primary defect. The remaining additional defect was repaired with the flap mentioned below. Complex Repair And Graft Additional Text (Will Appearing After The Standard Complex Repair Text): The complex repair was not sufficient to completely close the primary defect. The remaining additional defect was repaired with the graft mentioned below. Eyelid Full Thickness Repair - 76955: The eyelid defect was full thickness which required a wedge repair of the eyelid. Special care was taken to ensure that the eyelid margin was realligned when placing sutures. Eyelid Partial Thickness Repair - 86554: The eyelid defect was partial thickness which required a wedge repair of the eyelid. Special care was taken to ensure that the eyelid margin was realligned when placing sutures. Intermediate Repair And Flap Additional Text (Will Appearing After The Standard Complex Repair Text): The intermediate repair was not sufficient to completely close the primary defect. The remaining additional defect was repaired with the flap mentioned below. Intermediate Repair And Graft Additional Text (Will Appearing After The Standard Complex Repair Text): The intermediate repair was not sufficient to completely close the primary defect. The remaining additional defect was repaired with the graft mentioned below. Localized Dermabrasion With 15 Blade Text: The patient was draped in routine manner.  Localized dermabrasion using a 15 blade was performed in routine manner to papillary dermis. This spot dermabrasion is being performed to complete skin cancer reconstruction. It also will eliminate the other sun damaged precancerous cells that are known to be part of the regional effect of a lifetime's worth of sun exposure. This localized dermabrasion is therapeutic and should not be considered cosmetic in any regard. Localized Dermabrasion With Sand Papertext: The patient was draped in routine manner.  Localized dermabrasion using sterile sand paper was performed in routine manner to papillary dermis. This spot dermabrasion is being performed to complete skin cancer reconstruction. It also will eliminate the other sun damaged precancerous cells that are known to be part of the regional effect of a lifetime's worth of sun exposure. This localized dermabrasion is therapeutic and should not be considered cosmetic in any regard. Localized Dermabrasion With Wire Brush Text: The patient was draped in routine manner.  Localized dermabrasion using 3 x 17 mm wire brush was performed in routine manner to papillary dermis. This spot dermabrasion is being performed to complete skin cancer reconstruction. It also will eliminate the other sun damaged precancerous cells that are known to be part of the regional effect of a lifetime's worth of sun exposure. This localized dermabrasion is therapeutic and should not be considered cosmetic in any regard. Purse String (Simple) Text: Given the location of the defect and the characteristics of the surrounding skin a pursestring closure was deemed most appropriate.  Undermining was performed circumfirentially around the surgical defect.  A purstring suture was then placed and tightened. Purse String (Intermediate) Text: Given the location of the defect and the characteristics of the surrounding skin a pursestring intermediate closure was deemed most appropriate.  Undermining was performed circumfirentially around the surgical defect.  A purstring suture was then placed and tightened. Partial Purse String (Simple) Text: Given the location of the defect and the characteristics of the surrounding skin a simple purse string closure was deemed most appropriate.  Undermining was performed circumfirentially around the surgical defect.  A purse string suture was then placed and tightened. Wound tension only allowed a partial closure of the circular defect. Partial Purse String (Intermediate) Text: Given the location of the defect and the characteristics of the surrounding skin an intermediate purse string closure was deemed most appropriate.  Undermining was performed circumfirentially around the surgical defect.  A purse string suture was then placed and tightened. Wound tension only allowed a partial closure of the circular defect. Tarsorrhaphy Text: A tarsorrhaphy was performed using Frost sutures. Manual Repair Warning Statement: We plan on removing the manually selected variable below in favor of our much easier automatic structured text blocks found in the previous tab. We decided to do this to help make the flow better and give you the full power of structured data. Manual selection is never going to be ideal in our platform and I would encourage you to avoid using manual selection from this point on, especially since I will be sunsetting this feature. It is important that you do one of two things with the customized text below. First, you can save all of the text in a word file so you can have it for future reference. Second, transfer the text to the appropriate area in the Library tab. Lastly, if there is a flap or graft type which we do not have you need to let us know right away so I can add it in before the variable is hidden. No need to panic, we plan to give you roughly 6 months to make the change. Same Histology In Subsequent Stages Text: The pattern and morphology of the tumor is as described in the first stage. No Residual Tumor Seen Histology Text: There were no malignant cells seen in the sections examined. Inflammation Suggestive Of Cancer Camouflage Histology Text: There was a dense lymphocytic infiltrate which prevented adequate histologic evaluation of adjacent structures. Bcc Histology Text: Beneath an irregular epidermis, there are small nodular masses of basaloid neoplastic cells with nuclear pleomorphism attached to the basal layer and surrounded by a loose fibrous stroma and mild inflammation in the dermis. The findings are typical for a basal cell carcinoma. Bcc Infiltrative Histology Text: There were numerous aggregates of basaloid cells demonstrating an infiltrative pattern. Bcc  Morpheaform/Sclerosing Histology Text: irregular nests of pleomorphic, hyperchromatic basaloid cells with peripheral palisading infiltrate the tissue in a diffuse fashion in association with a mucoid stroma and dense dermal sclerosis. The tumor infiltrates in thin strands and single cells among the sclerotic collagen fibers in a pattern of morpheaform variant of basal cell carcinoma. Bcc  Nodular Histology Text: there are are irregular nodular masses and strands of basaloid pleomorphic, hyperchromatic neoplastic cells in the tissue, some areas of which show abundant dense sclerotic stroma. The tumor appears to be a nodular basal cell carcinoma, but with areas of sclerosis within it. Bcc Superficial Histology Text: Beneath an irregular epidermis, there are small nodular masses of basaloid neoplastic cells with nuclear pleomorphism attached to the basal layer and surrounded by a loose fibrous stroma and mild inflammation in the superficial dermis. The findings are typical for a superficial type of basal cell carcinoma. Mixed Nodular And Infiltrative Bcc Histology Text: Irregular nests of pleomorphic, hyperchromatic basaloid cells with peripheral palisading infiltrate the tissue in a diffuse fashion in association with sclerotic stroma. Scc Histology Text: There is hyperkeratosis, acanthosis, and cytologic atypia of keratinocytes with hyperchromatic and pleomorphic nuclei throughout the epidermis. No dermal invasion is seen. Chronic inflammation is present in the dermis. Scc Poorly Differentiated Histology Text: there are strands and nests of pleomorphic cells present in the infiltrative pattern. Mitotic activity is brisk. There is vascular proliferation and acute and chronic inflammation in the dermis. The findings are those of a poorly differentiated squamous cell carcinoma. Scc Ka Subtype Histology Text: there is a cup-shaped exoendophytic epithelial neoplasm characterized by proliferations of keratinocytes with abundant eosinophilic glossy cytoplasm and nuclei with open chromatin in the papillary and upper reticular dermis. Multiple inclusions containing elastic material are seen within the cytoplasm. The features are of squamous cell carcinoma, keratoacanthoma type. Scc In Situ Histology Text: There is hyperkeratosis, acanthosis, and cytologic atypia of keratinocytes with hyperchromatic and pleomorphic nuclei throughout the full thickness of the epidermis. No dermal invasion is seen. Chronic inflammation is present in the dermis. Melanoma In Situ Histology Text: On a sun-damaged skin, there is a broad and asymmetrical proliferation of enlarged and atypical melanocytes present continuously as single cells and in small irregular coalescing nests along the dermoepidermal junction. The melanocytes extend into the superficial portions of epithelial structures of adnexa. Pagetoid spread of melanocytes is appreciated. Occasional mitotic figures and individually necrotic melanocytes are also noted. In the underlying papillary dermis, there is mild lymphocytic inflammatory infiltrate with prominent dermal fibroplasia and melanophages. Afx Histology Text: there is a poorly differentiated spindled cell neoplasm composed of fascicles of atypical spindled and epithelioid cells, infiltrating and replacing papillary and reticular dermis. Mitotic activity is brisk, including atypical forms. The lesion is present on a sun-damaged skin. This pattern supports the diagnosis of atypical fibrous xanthoma. Mart-1 - Positive Histology Text: MART-1 staining demonstrates areas of higher density and clustering of melanocytes with Pagetoid spread upwards within the epidermis. The surgical margins are positive for tumor cells. Mart-1 - Negative Histology Text: MART-1 staining demonstrates a normal density and pattern of melanocytes along the dermal-epidermal junction. The surgical margins are negative for tumor cells. Information: Selecting Yes will display possible errors in your note based on the variables you have selected. This validation is only offered as a suggestion for you. PLEASE NOTE THAT THE VALIDATION TEXT WILL BE REMOVED WHEN YOU FINALIZE YOUR NOTE. IF YOU WANT TO FAX A PRELIMINARY NOTE YOU WILL NEED TO TOGGLE THIS TO 'NO' IF YOU DO NOT WANT IT IN YOUR FAXED NOTE. Bill 59 Modifier?: No - Continue to Bill 79 Modifier

## 2024-09-17 LAB
ANION GAP SERPL CALC-SCNC: 14 MMOL/L — SIGNIFICANT CHANGE UP (ref 7–14)
BUN SERPL-MCNC: 14 MG/DL — SIGNIFICANT CHANGE UP (ref 7–23)
CALCIUM SERPL-MCNC: 8.7 MG/DL — SIGNIFICANT CHANGE UP (ref 8.4–10.5)
CHLORIDE SERPL-SCNC: 103 MMOL/L — SIGNIFICANT CHANGE UP (ref 98–107)
CO2 SERPL-SCNC: 23 MMOL/L — SIGNIFICANT CHANGE UP (ref 22–31)
CREAT SERPL-MCNC: 0.72 MG/DL — SIGNIFICANT CHANGE UP (ref 0.5–1.3)
EGFR: 110 ML/MIN/1.73M2 — SIGNIFICANT CHANGE UP
GLUCOSE SERPL-MCNC: 92 MG/DL — SIGNIFICANT CHANGE UP (ref 70–99)
HCT VFR BLD CALC: 32.1 % — LOW (ref 34.5–45)
HGB BLD-MCNC: 10.5 G/DL — LOW (ref 11.5–15.5)
MAGNESIUM SERPL-MCNC: 1.8 MG/DL — SIGNIFICANT CHANGE UP (ref 1.6–2.6)
MCHC RBC-ENTMCNC: 31.3 PG — SIGNIFICANT CHANGE UP (ref 27–34)
MCHC RBC-ENTMCNC: 32.7 GM/DL — SIGNIFICANT CHANGE UP (ref 32–36)
MCV RBC AUTO: 95.5 FL — SIGNIFICANT CHANGE UP (ref 80–100)
MRSA PCR RESULT.: SIGNIFICANT CHANGE UP
NRBC # BLD: 0 /100 WBCS — SIGNIFICANT CHANGE UP (ref 0–0)
NRBC # FLD: 0 K/UL — SIGNIFICANT CHANGE UP (ref 0–0)
PHOSPHATE SERPL-MCNC: 4.1 MG/DL — SIGNIFICANT CHANGE UP (ref 2.5–4.5)
PLATELET # BLD AUTO: 117 K/UL — LOW (ref 150–400)
POTASSIUM SERPL-MCNC: 4.2 MMOL/L — SIGNIFICANT CHANGE UP (ref 3.5–5.3)
POTASSIUM SERPL-SCNC: 4.2 MMOL/L — SIGNIFICANT CHANGE UP (ref 3.5–5.3)
RBC # BLD: 3.36 M/UL — LOW (ref 3.8–5.2)
RBC # FLD: 14.3 % — SIGNIFICANT CHANGE UP (ref 10.3–14.5)
S AUREUS DNA NOSE QL NAA+PROBE: SIGNIFICANT CHANGE UP
SODIUM SERPL-SCNC: 140 MMOL/L — SIGNIFICANT CHANGE UP (ref 135–145)
WBC # BLD: 5.7 K/UL — SIGNIFICANT CHANGE UP (ref 3.8–10.5)
WBC # FLD AUTO: 5.7 K/UL — SIGNIFICANT CHANGE UP (ref 3.8–10.5)

## 2024-09-17 PROCEDURE — 99233 SBSQ HOSP IP/OBS HIGH 50: CPT

## 2024-09-17 RX ORDER — DIPHENHYDRAMINE HCL 50 MG
25 CAPSULE ORAL ONCE
Refills: 0 | Status: COMPLETED | OUTPATIENT
Start: 2024-09-17 | End: 2024-09-17

## 2024-09-17 RX ADMIN — Medication 25 MILLIGRAM(S): at 11:58

## 2024-09-17 RX ADMIN — SUCRALFATE 1 GRAM(S): 1 SUSPENSION ORAL at 17:53

## 2024-09-17 RX ADMIN — Medication 3 MILLIGRAM(S): at 21:43

## 2024-09-17 RX ADMIN — NALTREXONE HYDROCHLORIDE 50 MILLIGRAM(S): 50 TABLET, FILM COATED ORAL at 11:57

## 2024-09-17 RX ADMIN — Medication 0.1 MILLIGRAM(S): at 21:44

## 2024-09-17 RX ADMIN — CHLORHEXIDINE GLUCONATE 1 APPLICATION(S): 40 SOLUTION TOPICAL at 05:46

## 2024-09-17 RX ADMIN — Medication 50 MILLIGRAM(S): at 21:43

## 2024-09-17 RX ADMIN — SUCRALFATE 1 GRAM(S): 1 SUSPENSION ORAL at 05:46

## 2024-09-17 RX ADMIN — Medication 25 MILLIGRAM(S): at 11:56

## 2024-09-17 RX ADMIN — Medication 25 MILLIGRAM(S): at 07:38

## 2024-09-17 RX ADMIN — APIXABAN 5 MILLIGRAM(S): 5 TABLET, FILM COATED ORAL at 05:46

## 2024-09-17 RX ADMIN — Medication 80 MILLIGRAM(S): at 21:42

## 2024-09-17 RX ADMIN — ESCITALOPRAM OXALATE 20 MILLIGRAM(S): 10 TABLET ORAL at 11:57

## 2024-09-17 RX ADMIN — DIVALPROEX SODIUM 750 MILLIGRAM(S): 125 CAPSULE, DELAYED RELEASE ORAL at 09:55

## 2024-09-17 RX ADMIN — Medication 200 MILLIGRAM(S): at 21:44

## 2024-09-17 RX ADMIN — DIVALPROEX SODIUM 750 MILLIGRAM(S): 125 CAPSULE, DELAYED RELEASE ORAL at 18:00

## 2024-09-17 RX ADMIN — Medication 5 MILLIGRAM(S): at 21:45

## 2024-09-17 RX ADMIN — Medication 5 MILLIGRAM(S): at 09:54

## 2024-09-17 RX ADMIN — APIXABAN 5 MILLIGRAM(S): 5 TABLET, FILM COATED ORAL at 17:55

## 2024-09-17 RX ADMIN — RISPERIDONE 3 MILLIGRAM(S): 0.25 TABLET, FILM COATED ORAL at 21:44

## 2024-09-17 RX ADMIN — Medication 100 MICROGRAM(S): at 05:46

## 2024-09-17 NOTE — PROGRESS NOTE ADULT - TIME BILLING
Time-based billing (NON-critical care).     50 minutes spent on total encounter; more than 50% of the visit was spent counseling and / or coordinating care by the attending physician.  The necessity of the time spent during the encounter on this date of service was due to:     review of laboratory data, radiology results, consultants' recommendations, documentation in [place], discussion with patient/ACP and interdisciplinary staff (such as , social workers, etc). Interventions were performed as documented above.
Time-based billing (NON-critical care).     35 minutes spent on total encounter; more than 50% of the visit was spent counseling and / or coordinating care by the attending physician.  The necessity of the time spent during the encounter on this date of service was due to:     review of laboratory data, radiology results, consultants' recommendations, documentation in [place], discussion with patient/ACP and interdisciplinary staff (such as , social workers, etc). Interventions were performed as documented above.
Time-based billing (NON-critical care).     35 minutes spent on total encounter; more than 50% of the visit was spent counseling and / or coordinating care by the attending physician.  The necessity of the time spent during the encounter on this date of service was due to:     review of laboratory data, radiology results, consultants' recommendations, documentation in [place], discussion with patient/ACP and interdisciplinary staff (such as , social workers, etc). Interventions were performed as documented above.
Time-based billing (NON-critical care).     35 minutes spent on total encounter; more than 50% of the visit was spent counseling and / or coordinating care by the attending physician.  The necessity of the time spent during the encounter on this date of service was due to:     review of laboratory data, radiology results, consultants' recommendations, documentation in [place], discussion with patient/ACP and interdisciplinary staff (such as , social workers, etc). Interventions were performed as documented above.
Time-based billing (NON-critical care).     More than 50% of the visit was spent counseling and / or coordinating care by the attending physician.      The necessity of the time spent during the encounter on this date of service was due to: documentation in [place], reviewing chart and coordinating care with patient/ACPs and interdisciplinary staff (such as , social workers, etc) as well as reviewing vitals, laboratory data, radiology, medication list, consultants' recommendations and prior records. Interventions were performed as documented above.

## 2024-09-18 LAB — SARS-COV-2 RNA SPEC QL NAA+PROBE: SIGNIFICANT CHANGE UP

## 2024-09-18 PROCEDURE — 99232 SBSQ HOSP IP/OBS MODERATE 35: CPT

## 2024-09-18 RX ORDER — DIPHENHYDRAMINE HCL 50 MG
25 CAPSULE ORAL ONCE
Refills: 0 | Status: COMPLETED | OUTPATIENT
Start: 2024-09-18 | End: 2024-09-18

## 2024-09-18 RX ORDER — ACETAMINOPHEN 325 MG/1
650 TABLET ORAL EVERY 6 HOURS
Refills: 0 | Status: DISCONTINUED | OUTPATIENT
Start: 2024-09-18 | End: 2024-09-19

## 2024-09-18 RX ORDER — MAGNESIUM, ALUMINUM HYDROXIDE 200-225/5
30 SUSPENSION, ORAL (FINAL DOSE FORM) ORAL EVERY 4 HOURS
Refills: 0 | Status: DISCONTINUED | OUTPATIENT
Start: 2024-09-18 | End: 2024-09-19

## 2024-09-18 RX ADMIN — APIXABAN 5 MILLIGRAM(S): 5 TABLET, FILM COATED ORAL at 05:25

## 2024-09-18 RX ADMIN — RISPERIDONE 3 MILLIGRAM(S): 0.25 TABLET, FILM COATED ORAL at 21:27

## 2024-09-18 RX ADMIN — Medication 200 MILLIGRAM(S): at 21:26

## 2024-09-18 RX ADMIN — Medication 0.1 MILLIGRAM(S): at 21:26

## 2024-09-18 RX ADMIN — Medication 100 MICROGRAM(S): at 05:25

## 2024-09-18 RX ADMIN — Medication 3 MILLIGRAM(S): at 21:27

## 2024-09-18 RX ADMIN — APIXABAN 5 MILLIGRAM(S): 5 TABLET, FILM COATED ORAL at 17:19

## 2024-09-18 RX ADMIN — Medication 80 MILLIGRAM(S): at 21:25

## 2024-09-18 RX ADMIN — Medication 25 MILLIGRAM(S): at 08:08

## 2024-09-18 RX ADMIN — Medication 25 MILLIGRAM(S): at 17:16

## 2024-09-18 RX ADMIN — DIVALPROEX SODIUM 750 MILLIGRAM(S): 125 CAPSULE, DELAYED RELEASE ORAL at 09:42

## 2024-09-18 RX ADMIN — Medication 25 MILLIGRAM(S): at 09:42

## 2024-09-18 RX ADMIN — SUCRALFATE 1 GRAM(S): 1 SUSPENSION ORAL at 17:18

## 2024-09-18 RX ADMIN — Medication 25 MILLIGRAM(S): at 11:47

## 2024-09-18 RX ADMIN — SUCRALFATE 1 GRAM(S): 1 SUSPENSION ORAL at 05:26

## 2024-09-18 RX ADMIN — Medication 5 MILLIGRAM(S): at 22:50

## 2024-09-18 RX ADMIN — Medication 5 MILLIGRAM(S): at 11:45

## 2024-09-18 RX ADMIN — ESCITALOPRAM OXALATE 20 MILLIGRAM(S): 10 TABLET ORAL at 11:47

## 2024-09-18 RX ADMIN — NALTREXONE HYDROCHLORIDE 50 MILLIGRAM(S): 50 TABLET, FILM COATED ORAL at 11:46

## 2024-09-18 RX ADMIN — Medication 50 MILLIGRAM(S): at 21:25

## 2024-09-18 RX ADMIN — DIVALPROEX SODIUM 750 MILLIGRAM(S): 125 CAPSULE, DELAYED RELEASE ORAL at 17:18

## 2024-09-18 RX ADMIN — CHLORHEXIDINE GLUCONATE 1 APPLICATION(S): 40 SOLUTION TOPICAL at 05:30

## 2024-09-18 NOTE — PROGRESS NOTE ADULT - ASSESSMENT
38F PMH of explosive disorder, mild intellectual disability, CVA (in 2018 on Eliquis), factor V Leiden deficiency c/b DVT/PE, hypothyroid, GERD who presented for evaluation of bilateral lower extremity numbness. 
38 year old female with past medical history of explosive disorder, mild intellectual disability, CVA (in 2018 on Eliquis), factor V Leiden deficiency a/w DVT/PE, hypothyroid, GERD presents to emergency department for evaluation of bilateral lower extremity numbness. 

## 2024-09-18 NOTE — CHART NOTE - NSCHARTNOTEFT_GEN_A_CORE
Patient Demographic Information (PDI)       PDI	First Name	Last Name	Birth Date	Gender	Street Address	[facility]	Zip Code  A	Jt Marte	1985	Female	[address]	[facility]	[address]  B	Jt Marte	1985	Female	[id number]	[address]    Prescription Information      PDI Filter:    PDI	My Rx	Current Rx	Drug Type	Rx Written	Rx Dispensed	Drug	Quantity	Days Supply  A	N	N	O	11/10/2023	11/11/2023	oxycodone-acetaminophen 5-325 mg tab	6	3  Prescriber Name Nahun Mc MD  Prescriber SHERRI # [id number]  Payment Method Medicare  Dispenser [facility]  A	N	N	O	10/13/2023	10/13/2023	oxycodone-acetaminophen 5-325 mg tab	8	2  Prescriber Name Sacks, Harry G DDS  Prescriber SHERRI # [id number]  Payment Method Medicare  Dispenser [facility]  B	N	N	O	08/11/2024	08/12/2024	oxycodone-acetaminophen 5-325 mg tab	4	1  Prescriber Name Kevin Samaniego)  Prescriber SHERRI # [id number]  Payment Method Insurance  Dispenser [facility]  B	N	N	O	08/06/2024	08/06/2024	oxycodone hcl (ir) 5 mg tablet	5	2  Prescriber Name Breanna Brooks  Prescriber SHERRI # [id number]  Payment Method Insurance  Dispenser [facility]

## 2024-09-18 NOTE — PROGRESS NOTE ADULT - SUBJECTIVE AND OBJECTIVE BOX
Juan Pablo Morales MD  Division of Hospitalist Medicine  Pager [phone]  Available on Teams    CC: Patient is a 39y old  Female who presents with a chief complaint of       SUBJECTIVE / INTERVAL HPI: Patient seen and examined at bedside. Pt grossly denies any acute complaints.     ROS: negative unless otherwise stated above.    VITAL SIGNS:  Vital Signs Last 24 Hrs  T(C): 36.7 (18 Sep 2024 13:22), Max: 36.8 (18 Sep 2024 09:20)  T(F): 98.1 (18 Sep 2024 13:22), Max: 98.3 (18 Sep 2024 09:20)  HR: 92 (18 Sep 2024 13:22) (81 - 92)  BP: 109/71 (18 Sep 2024 13:22) (90/53 - 117/73)  BP(mean): --  RR: 18 (18 Sep 2024 13:22) (17 - 18)  SpO2: 100% (18 Sep 2024 13:22) (98% - 100%)    Parameters below as of 18 Sep 2024 13:22  Patient On (Oxygen Delivery Method): room air            PHYSICAL EXAM:    General: NAD  HEENT: MMM  Neck: supple  Cardiovascular: +S1/S2; RRR  Respiratory: CTA B/L; no W/R/R  Gastrointestinal: soft, NT/ND  Extremities: WWP; no edema, clubbing or cyanosis  Neurological: awake and alert; communicating and able to follow commands; unable to lift b/l LEs off of bed; moving b/l UEs without issue    MEDICATIONS:  MEDICATIONS  (STANDING):  apixaban 5 milliGRAM(s) Oral two times a day  atorvastatin 80 milliGRAM(s) Oral at bedtime  chlorhexidine 2% Cloths 1 Application(s) Topical <User Schedule>  cloNIDine 0.1 milliGRAM(s) Oral at bedtime  diphenhydrAMINE 50 milliGRAM(s) Oral at bedtime  divalproex  milliGRAM(s) Oral <User Schedule>  escitalopram 20 milliGRAM(s) Oral daily  influenza   Vaccine 0.5 milliLiter(s) IntraMuscular once  levothyroxine 100 MICROGram(s) Oral daily  melatonin 3 milliGRAM(s) Oral at bedtime  naltrexone 50 milliGRAM(s) Oral daily  QUEtiapine 25 milliGRAM(s) Oral daily  QUEtiapine 200 milliGRAM(s) Oral at bedtime  risperiDONE   Tablet 3 milliGRAM(s) Oral at bedtime  sucralfate 1 Gram(s) Oral two times a day    MEDICATIONS  (PRN):  acetaminophen     Tablet .. 650 milliGRAM(s) Oral every 6 hours PRN Temp greater or equal to 38C (100.4F), Mild Pain (1 - 3)  aluminum hydroxide/magnesium hydroxide/simethicone Suspension 30 milliLiter(s) Oral every 4 hours PRN Dyspepsia  cyclobenzaprine 5 milliGRAM(s) Oral two times a day PRN Muscle Spasm  diphenhydrAMINE 25 milliGRAM(s) Oral every 6 hours PRN Rash and/or Itching      ALLERGIES:  Allergies    [This allergen will not trigger allergy alert] pineapple allergenic extract (Swelling)  [This allergen will not trigger allergy alert] Toradol SOLN (Unknown)  TraMADol Hydrochloride (Unknown)  gabapentin (Unknown)  Zofran (Unknown)  ibuprofen (Pruritus)  latex (Blisters)  Zofran (Hives)  penicillin (Other)  Toradol (Unknown)  Toradol (Hives)  Zofran (Rash)  TraZODone Hydrochloride (Unknown)  trazodone (Other)  penicillin (Hives)    Intolerances        LABS:                        10.5   5.70  )-----------( 117      ( 17 Sep 2024 04:45 )             32.1     09-17    140  |  103  |  14  ----------------------------<  92  4.2   |  23  |  0.72    Ca    8.7      17 Sep 2024 04:45  Phos  4.1     09-17  Mg     1.80     09-17        Urinalysis Basic - ( 17 Sep 2024 04:45 )    Color: x / Appearance: x / SG: x / pH: x  Gluc: 92 mg/dL / Ketone: x  / Bili: x / Urobili: x   Blood: x / Protein: x / Nitrite: x   Leuk Esterase: x / RBC: x / WBC x   Sq Epi: x / Non Sq Epi: x / Bacteria: x      CAPILLARY BLOOD GLUCOSE          RADIOLOGY & ADDITIONAL TESTS: Reviewed.
Juan Pablo Morales MD  Division of Hospitalist Medicine  Pager [phone]  Available on Teams    CC: Patient is a 39y old  Female who presents with a chief complaint of       SUBJECTIVE / INTERVAL HPI: Patient seen and examined at bedside. Pt grossly denies any acute complaints.    ROS: negative unless otherwise stated above.    VITAL SIGNS:  Vital Signs Last 24 Hrs  T(C): 36.3 (17 Sep 2024 13:40), Max: 36.8 (17 Sep 2024 01:00)  T(F): 97.4 (17 Sep 2024 13:40), Max: 98.3 (17 Sep 2024 01:00)  HR: 82 (17 Sep 2024 13:40) (74 - 95)  BP: 102/57 (17 Sep 2024 13:40) (92/60 - 111/68)  BP(mean): --  RR: 18 (17 Sep 2024 13:40) (16 - 18)  SpO2: 98% (17 Sep 2024 13:40) (98% - 100%)    Parameters below as of 17 Sep 2024 13:40  Patient On (Oxygen Delivery Method): room air            PHYSICAL EXAM:    General: NAD  HEENT: MMM  Neck: supple  Cardiovascular: +S1/S2; RRR  Respiratory: CTA B/L; no W/R/R  Gastrointestinal: soft, NT/ND  Extremities: WWP; no edema, clubbing or cyanosis  Neurological: awake and alert; communicating and able to follow commands; unable to lift b/l LEs off of bed; moving b/l UEs without issue    MEDICATIONS:  MEDICATIONS  (STANDING):  apixaban 5 milliGRAM(s) Oral two times a day  atorvastatin 80 milliGRAM(s) Oral at bedtime  chlorhexidine 2% Cloths 1 Application(s) Topical <User Schedule>  cloNIDine 0.1 milliGRAM(s) Oral at bedtime  diphenhydrAMINE 50 milliGRAM(s) Oral at bedtime  divalproex  milliGRAM(s) Oral <User Schedule>  escitalopram 20 milliGRAM(s) Oral daily  influenza   Vaccine 0.5 milliLiter(s) IntraMuscular once  levothyroxine 100 MICROGram(s) Oral daily  melatonin 3 milliGRAM(s) Oral at bedtime  naltrexone 50 milliGRAM(s) Oral daily  QUEtiapine 25 milliGRAM(s) Oral daily  QUEtiapine 200 milliGRAM(s) Oral at bedtime  risperiDONE   Tablet 3 milliGRAM(s) Oral at bedtime  sucralfate 1 Gram(s) Oral two times a day    MEDICATIONS  (PRN):  cyclobenzaprine 5 milliGRAM(s) Oral two times a day PRN Muscle Spasm  diphenhydrAMINE 25 milliGRAM(s) Oral every 6 hours PRN Rash and/or Itching      ALLERGIES:  Allergies    [This allergen will not trigger allergy alert] pineapple allergenic extract (Swelling)  [This allergen will not trigger allergy alert] Toradol SOLN (Unknown)  TraMADol Hydrochloride (Unknown)  gabapentin (Unknown)  Zofran (Unknown)  ibuprofen (Pruritus)  latex (Blisters)  Zofran (Hives)  penicillin (Other)  Toradol (Unknown)  Toradol (Hives)  Zofran (Rash)  TraZODone Hydrochloride (Unknown)  trazodone (Other)  penicillin (Hives)    Intolerances        LABS:                        10.5   5.70  )-----------( 117      ( 17 Sep 2024 04:45 )             32.1     09-17    140  |  103  |  14  ----------------------------<  92  4.2   |  23  |  0.72    Ca    8.7      17 Sep 2024 04:45  Phos  4.1     09-17  Mg     1.80     09-17        Urinalysis Basic - ( 17 Sep 2024 04:45 )    Color: x / Appearance: x / SG: x / pH: x  Gluc: 92 mg/dL / Ketone: x  / Bili: x / Urobili: x   Blood: x / Protein: x / Nitrite: x   Leuk Esterase: x / RBC: x / WBC x   Sq Epi: x / Non Sq Epi: x / Bacteria: x      CAPILLARY BLOOD GLUCOSE          RADIOLOGY & ADDITIONAL TESTS: Reviewed.
Natalie Ibarra MD  [facility] Division of Hospital Medicine  Pager: [phone]  Available via MS Teams    SUBJECTIVE / OVERNIGHT EVENTS:    complains of b/l le pain still     MEDICATIONS  (STANDING):  apixaban 5 milliGRAM(s) Oral two times a day  atorvastatin 80 milliGRAM(s) Oral at bedtime  cloNIDine 0.1 milliGRAM(s) Oral at bedtime  diphenhydrAMINE 50 milliGRAM(s) Oral at bedtime  divalproex  milliGRAM(s) Oral <User Schedule>  escitalopram 20 milliGRAM(s) Oral daily  influenza   Vaccine 0.5 milliLiter(s) IntraMuscular once  levothyroxine 100 MICROGram(s) Oral daily  melatonin 3 milliGRAM(s) Oral at bedtime  naltrexone 50 milliGRAM(s) Oral daily  QUEtiapine 25 milliGRAM(s) Oral daily  QUEtiapine 200 milliGRAM(s) Oral at bedtime  risperiDONE   Tablet 1 milliGRAM(s) Oral at bedtime  sucralfate 1 Gram(s) Oral two times a day    MEDICATIONS  (PRN):  cyclobenzaprine 5 milliGRAM(s) Oral two times a day PRN Muscle Spasm      I&O's Summary      PHYSICAL EXAM:  Vital Signs Last 24 Hrs  T(C): 36.7 (13 Sep 2024 13:45), Max: 36.7 (12 Sep 2024 18:30)  T(F): 98.1 (13 Sep 2024 13:45), Max: 98.1 (12 Sep 2024 18:30)  HR: 92 (13 Sep 2024 13:45) (85 - 96)  BP: 100/58 (13 Sep 2024 13:45) (97/65 - 109/65)  BP(mean): --  RR: 17 (13 Sep 2024 13:45) (14 - 17)  SpO2: 98% (13 Sep 2024 13:45) (96% - 99%)    Parameters below as of 13 Sep 2024 13:45  Patient On (Oxygen Delivery Method): room air      CONSTITUTIONAL: NAD   EYES: conjunctiva and sclera clear  ENMT: Moist oral mucosa   NECK: Supple   RESPIRATORY: Normal respiratory effort; lungs are clear to auscultation bilaterally  CARDIOVASCULAR: Regular rate and rhythm, normal S1 and S2, no murmur/rub/gallop; Peripheral pulses are 2+ bilaterally  ABDOMEN: Nontender to palpation, normoactive bowel sounds, no rebound/guarding   MUSCULOSKELETAL: No lower extremity edema   PSYCH: A+O to person, place, and time; affect appropriate  NEUROLOGY: moves all extremities   SKIN: No rashes    LABS:                        13.0   4.82  )-----------( 107      ( 12 Sep 2024 12:30 )             39.7     09-13    139  |  102  |  11  ----------------------------<  112<H>  4.2   |  25  |  0.73    Ca    8.8      13 Sep 2024 04:58    TPro  8.1  /  Alb  4.5  /  TBili  0.2  /  DBili  x   /  AST  28  /  ALT  47<H>  /  AlkPhos  77  09-12    PT/INR - ( 12 Sep 2024 12:30 )   PT: 12.1 sec;   INR: 1.07 ratio         PTT - ( 12 Sep 2024 12:30 )  PTT:29.2 sec      Urinalysis Basic - ( 13 Sep 2024 04:58 )    Color: x / Appearance: x / SG: x / pH: x  Gluc: 112 mg/dL / Ketone: x  / Bili: x / Urobili: x   Blood: x / Protein: x / Nitrite: x   Leuk Esterase: x / RBC: x / WBC x   Sq Epi: x / Non Sq Epi: x / Bacteria: x            RADIOLOGY & ADDITIONAL TESTS:  Other Results Reviewed Today: BMP with stable Cr, CBC with stable Hg     COORDINATION OF CARE:  Communication: discussed plan of care with ACP 
Nataile Ibarra MD  [facility] Division of Hospital Medicine  Pager: [phone]  Available via MS Teams    SUBJECTIVE / OVERNIGHT EVENTS:    still with LE numbness, improved with walking     MEDICATIONS  (STANDING):  apixaban 5 milliGRAM(s) Oral two times a day  atorvastatin 80 milliGRAM(s) Oral at bedtime  cloNIDine 0.1 milliGRAM(s) Oral at bedtime  diphenhydrAMINE 50 milliGRAM(s) Oral at bedtime  divalproex  milliGRAM(s) Oral <User Schedule>  escitalopram 20 milliGRAM(s) Oral daily  influenza   Vaccine 0.5 milliLiter(s) IntraMuscular once  levothyroxine 100 MICROGram(s) Oral daily  melatonin 3 milliGRAM(s) Oral at bedtime  naltrexone 50 milliGRAM(s) Oral daily  QUEtiapine 25 milliGRAM(s) Oral daily  QUEtiapine 200 milliGRAM(s) Oral at bedtime  risperiDONE   Tablet 3 milliGRAM(s) Oral at bedtime  sucralfate 1 Gram(s) Oral two times a day    MEDICATIONS  (PRN):  cyclobenzaprine 5 milliGRAM(s) Oral two times a day PRN Muscle Spasm  diphenhydrAMINE 25 milliGRAM(s) Oral every 6 hours PRN Rash and/or Itching      I&O's Summary      PHYSICAL EXAM:  Vital Signs Last 24 Hrs  T(C): 36.7 (15 Sep 2024 16:00), Max: 36.7 (15 Sep 2024 01:05)  T(F): 98.1 (15 Sep 2024 16:00), Max: 98.1 (15 Sep 2024 16:00)  HR: 80 (15 Sep 2024 16:00) (69 - 92)  BP: 110/70 (15 Sep 2024 16:00) (100/70 - 110/70)  BP(mean): --  RR: 16 (15 Sep 2024 16:00) (16 - 18)  SpO2: 100% (15 Sep 2024 16:00) (96% - 100%)    Parameters below as of 15 Sep 2024 16:00  Patient On (Oxygen Delivery Method): room air      CONSTITUTIONAL: NAD   EYES: conjunctiva and sclera clear  ENMT: Moist oral mucosa   NECK: Supple   RESPIRATORY: Normal respiratory effort; lungs are clear to auscultation bilaterally  CARDIOVASCULAR: Regular rate and rhythm, normal S1 and S2, no murmur/rub/gallop; Peripheral pulses are 2+ bilaterally  ABDOMEN: Nontender to palpation, normoactive bowel sounds, no rebound/guarding   MUSCULOSKELETAL: No lower extremity edema   PSYCH: A+O to person, place, and time; affect appropriate  NEUROLOGY: moves all extremities   SKIN: No rashes    LABS:                        11.0   5.64  )-----------( 107      ( 15 Sep 2024 06:37 )             33.8     09-15    139  |  102  |  14  ----------------------------<  90  4.5   |  24  |  0.75    Ca    9.3      15 Sep 2024 06:37  Phos  4.0     09-15  Mg     2.00     09-15            Urinalysis Basic - ( 15 Sep 2024 06:37 )    Color: x / Appearance: x / SG: x / pH: x  Gluc: 90 mg/dL / Ketone: x  / Bili: x / Urobili: x   Blood: x / Protein: x / Nitrite: x   Leuk Esterase: x / RBC: x / WBC x   Sq Epi: x / Non Sq Epi: x / Bacteria: x            RADIOLOGY & ADDITIONAL TESTS:  Other Results Reviewed Today: BMP with stable Cr, CBC with stable Hg     COORDINATION OF CARE:  Communication: discussed plan of care with ACP 
Juan Pablo Morales MD  Division of Hospitalist Medicine  Pager [phone]  Available on Teams    CC: Patient is a 39y old  Female who presents with a chief complaint of       SUBJECTIVE / INTERVAL HPI: Patient seen and examined at bedside. Pt grossly denies any acute complaints.     ROS: negative unless otherwise stated above.    VITAL SIGNS:  Vital Signs Last 24 Hrs  T(C): 36.7 (16 Sep 2024 13:00), Max: 36.7 (16 Sep 2024 05:03)  T(F): 98 (16 Sep 2024 13:00), Max: 98 (16 Sep 2024 05:03)  HR: 74 (16 Sep 2024 13:00) (74 - 90)  BP: 108/72 (16 Sep 2024 13:00) (93/52 - 118/89)  BP(mean): --  RR: 18 (16 Sep 2024 13:00) (16 - 18)  SpO2: 99% (16 Sep 2024 13:00) (97% - 99%)    Parameters below as of 16 Sep 2024 13:00  Patient On (Oxygen Delivery Method): room air            PHYSICAL EXAM:    General: NAD  HEENT: MMM  Neck: supple  Cardiovascular: +S1/S2; RRR  Respiratory: CTA B/L; no W/R/R  Gastrointestinal: soft, NT/ND  Extremities: WWP; no edema, clubbing or cyanosis  Neurological: awake and alert; communicating and able to follow commands; CN 2-12 intact; strength 5/5 any symmetric in b/l elbow flexion/extension, finger squeeze; able to lift RLE against gravity but can't hold it up; 2/5 hip flexion of LLE    MEDICATIONS:  MEDICATIONS  (STANDING):  apixaban 5 milliGRAM(s) Oral two times a day  atorvastatin 80 milliGRAM(s) Oral at bedtime  chlorhexidine 2% Cloths 1 Application(s) Topical <User Schedule>  cloNIDine 0.1 milliGRAM(s) Oral at bedtime  diphenhydrAMINE 50 milliGRAM(s) Oral at bedtime  divalproex  milliGRAM(s) Oral <User Schedule>  escitalopram 20 milliGRAM(s) Oral daily  influenza   Vaccine 0.5 milliLiter(s) IntraMuscular once  levothyroxine 100 MICROGram(s) Oral daily  melatonin 3 milliGRAM(s) Oral at bedtime  naltrexone 50 milliGRAM(s) Oral daily  QUEtiapine 25 milliGRAM(s) Oral daily  QUEtiapine 200 milliGRAM(s) Oral at bedtime  risperiDONE   Tablet 3 milliGRAM(s) Oral at bedtime  sucralfate 1 Gram(s) Oral two times a day    MEDICATIONS  (PRN):  cyclobenzaprine 5 milliGRAM(s) Oral two times a day PRN Muscle Spasm  diphenhydrAMINE 25 milliGRAM(s) Oral every 6 hours PRN Rash and/or Itching      ALLERGIES:  Allergies    [This allergen will not trigger allergy alert] pineapple allergenic extract (Swelling)  [This allergen will not trigger allergy alert] Toradol SOLN (Unknown)  TraMADol Hydrochloride (Unknown)  gabapentin (Unknown)  Zofran (Unknown)  ibuprofen (Pruritus)  latex (Blisters)  Zofran (Hives)  penicillin (Other)  Toradol (Unknown)  Toradol (Hives)  Zofran (Rash)  TraZODone Hydrochloride (Unknown)  trazodone (Other)  penicillin (Hives)    Intolerances        LABS:                        10.5   5.26  )-----------( 108      ( 16 Sep 2024 04:30 )             31.4     09-16    138  |  103  |  14  ----------------------------<  102<H>  4.1   |  25  |  0.68    Ca    8.7      16 Sep 2024 04:30  Phos  3.5     09-16  Mg     1.90     09-16        Urinalysis Basic - ( 16 Sep 2024 04:30 )    Color: x / Appearance: x / SG: x / pH: x  Gluc: 102 mg/dL / Ketone: x  / Bili: x / Urobili: x   Blood: x / Protein: x / Nitrite: x   Leuk Esterase: x / RBC: x / WBC x   Sq Epi: x / Non Sq Epi: x / Bacteria: x      CAPILLARY BLOOD GLUCOSE          RADIOLOGY & ADDITIONAL TESTS: Reviewed.
Natalie Ibarra MD  [facility] Division of Hospital Medicine  Pager: [phone]  Available via MS Teams    SUBJECTIVE / OVERNIGHT EVENTS:    continues to have b/l le numbness     MEDICATIONS  (STANDING):  apixaban 5 milliGRAM(s) Oral two times a day  atorvastatin 80 milliGRAM(s) Oral at bedtime  cloNIDine 0.1 milliGRAM(s) Oral at bedtime  diphenhydrAMINE 50 milliGRAM(s) Oral at bedtime  divalproex  milliGRAM(s) Oral <User Schedule>  escitalopram 20 milliGRAM(s) Oral daily  influenza   Vaccine 0.5 milliLiter(s) IntraMuscular once  levothyroxine 100 MICROGram(s) Oral daily  melatonin 3 milliGRAM(s) Oral at bedtime  naltrexone 50 milliGRAM(s) Oral daily  QUEtiapine 25 milliGRAM(s) Oral daily  QUEtiapine 200 milliGRAM(s) Oral at bedtime  risperiDONE   Tablet 3 milliGRAM(s) Oral at bedtime  sucralfate 1 Gram(s) Oral two times a day    MEDICATIONS  (PRN):  cyclobenzaprine 5 milliGRAM(s) Oral two times a day PRN Muscle Spasm  diphenhydrAMINE 25 milliGRAM(s) Oral every 6 hours PRN Rash and/or Itching      I&O's Summary      PHYSICAL EXAM:  Vital Signs Last 24 Hrs  T(C): 36.4 (14 Sep 2024 09:15), Max: 36.8 (13 Sep 2024 17:45)  T(F): 97.6 (14 Sep 2024 09:15), Max: 98.3 (13 Sep 2024 17:45)  HR: 91 (14 Sep 2024 09:15) (78 - 96)  BP: 113/78 (14 Sep 2024 09:15) (100/67 - 113/78)  BP(mean): --  RR: 17 (14 Sep 2024 09:15) (17 - 18)  SpO2: 96% (14 Sep 2024 09:15) (95% - 99%)    Parameters below as of 14 Sep 2024 09:15  Patient On (Oxygen Delivery Method): room air      CONSTITUTIONAL: NAD   EYES: conjunctiva and sclera clear  ENMT: Moist oral mucosa   NECK: Supple   RESPIRATORY: Normal respiratory effort; lungs are clear to auscultation bilaterally  CARDIOVASCULAR: Regular rate and rhythm, normal S1 and S2, no murmur/rub/gallop; Peripheral pulses are 2+ bilaterally  ABDOMEN: Nontender to palpation, normoactive bowel sounds, no rebound/guarding   MUSCULOSKELETAL: No lower extremity edema   PSYCH: A+O to person, place, and time; affect appropriate  NEUROLOGY: moves all extremities   SKIN: No rashes    LABS:    09-13    139  |  102  |  11  ----------------------------<  112<H>  4.2   |  25  |  0.73    Ca    8.8      13 Sep 2024 04:58            Urinalysis Basic - ( 13 Sep 2024 04:58 )    Color: x / Appearance: x / SG: x / pH: x  Gluc: 112 mg/dL / Ketone: x  / Bili: x / Urobili: x   Blood: x / Protein: x / Nitrite: x   Leuk Esterase: x / RBC: x / WBC x   Sq Epi: x / Non Sq Epi: x / Bacteria: x            RADIOLOGY & ADDITIONAL TESTS:  Other Results Reviewed Today: BMP with stable Cr, CBC with stable Hg     COORDINATION OF CARE:  Communication: discussed plan of care with ACP

## 2024-09-18 NOTE — PROGRESS NOTE ADULT - PROBLEM SELECTOR PLAN 2
- previous CVA  - left sided residual deficits  - continue apixiban

## 2024-09-18 NOTE — PROGRESS NOTE ADULT - PROBLEM SELECTOR PLAN 4
- continue home levothyroxine  - tsh WNL

## 2024-09-18 NOTE — PROGRESS NOTE ADULT - PROBLEM SELECTOR PLAN 5
- dvt: eliquis  - code: full  - diet: reg diet   - dispo: from group home     will need meeting with group home for pt to return on Monday
- dvt: eliquis  - code: full  - diet: reg diet   - dispo: from group home     will need meeting with group home for pt to return on Monday
- dvt: eliquis  - code: full  - diet: reg diet   - dispo: [facility]
- dvt: eliquis  - code: full  - diet: reg diet   - dispo: [facility]
- dvt: eliquis  - code: full  - diet: reg diet   - dispo: from group home     will need meeting with group home for pt to return on Monday
- dvt: eliquis  - code: full  - diet: reg diet   - dispo: from group home     will need meeting with group home for pt to return on Monday

## 2024-09-18 NOTE — PROGRESS NOTE ADULT - PROBLEM SELECTOR PLAN 1
- CTA neck: negative  - CT brain: unremarkable  - dysphagia eval  - MRI brain negative  - whole spine mri ordered as per neurology recs    - lipid panel HbA1C, tsh all WNL  - statin
- CTA neck: negative  - CT brain: unremarkable  - dysphagia eval passed  - MRI brain negative  - whole spine mri: No MR imaging evidence of demyelinating disease. Degenerative disc disease and disc herniation at the C5-C6 level. 7 mm pituitary gland lesion noted   Plan:  - neuro recs appreciated  - follow-up with NSGY after obtaining MRI brain w/wo contrast outpatient for pituitary lesion  - follow-up with behavioral health service outpatient  - follow up with general neurology at [address] [place] [address] 1-2 weeks after discharge (patient to call [phone] to schedule this appointment)
- CTA neck: negative  - CT brain: unremarkable  - dysphagia eval  - MRI brain negative  - whole spine mri ordered as per neurology recs: IMPRESSION: No MR imaging evidence of demyelinating disease.  Degenerative disc disease and disc herniation at the C5-C6 level. 7 mm pituitary gland lesion noted   - f/u neurology for further recs   - lipid panel HbA1C, tsh all WNL  - statin
- CTA neck: negative  - CT brain: unremarkable  - dysphagia eval  - MRI brain negative  - whole spine mri ordered as per neurology recs    - lipid panel HbA1C, tsh all WNL  - statin
- CTA neck: negative  - CT brain: unremarkable  - dysphagia eval passed  - MRI brain negative  - whole spine mri: No MR imaging evidence of demyelinating disease. Degenerative disc disease and disc herniation at the C5-C6 level. 7 mm pituitary gland lesion noted   Plan:  - neuro recs appreciated  - follow-up with NSGY after obtaining MRI brain w/wo contrast outpatient for pituitary lesion  - follow-up with behavioral health service outpatient  - follow up with general neurology at [address] [place] [address] 1-2 weeks after discharge (patient to call [phone] to schedule this appointment)  - PT rec: [facility]
- CTA neck: negative  - CT brain: unremarkable  - dysphagia eval passed  - MRI brain negative  - whole spine mri: No MR imaging evidence of demyelinating disease. Degenerative disc disease and disc herniation at the C5-C6 level. 7 mm pituitary gland lesion noted   Plan:  - neuro recs appreciated  - follow-up with NSGY after obtaining MRI brain w/wo contrast outpatient for pituitary lesion  - follow-up with behavioral health service outpatient  - follow up with general neurology at [address] [place] [address] 1-2 weeks after discharge (patient to call [phone] to schedule this appointment)  - PT rec: [facility]

## 2024-09-18 NOTE — PROGRESS NOTE ADULT - PROBLEM SELECTOR PLAN 3
- continue apixiban

## 2024-09-18 NOTE — CHART NOTE - NSCHARTNOTEFT_GEN_A_CORE
[facility] called because patient "threatened to throw herself on the floor" if she did not receive her percocet. Patient examined in no acute distress and calm, states "I don't know what all that was about." Patient explained she received percocet in the past for abdominal pain s/p appendectomy. Requesting 1x dose of percocet. Discussed with medical attng will order Peroccet x1 dose alongside maalox for gastric discomfort. Explained to patient if still in pain despite percocet, she can request for PO Tylenol. Patient agrees with plan. Will continue to monitor. [facility] called because patient "threatened to throw herself on the floor" if she did not receive her percocet. Patient examined in no acute distress and calm, states "I don't know what all that was about." Patient explained she received percocet in the past for abdominal pain s/p appendectomy. Requesting 1x dose of percocet 5mg/325mg. Discussed with medical attng will order Peroccet x1 dose alongside maalox for gastric discomfort. Explained to patient if still in pain despite percocet, she can request for PO Tylenol. Patient agrees with plan. Will continue to monitor.

## 2024-09-19 ENCOUNTER — TRANSCRIPTION ENCOUNTER (OUTPATIENT)
Age: 39
End: 2024-09-19

## 2024-09-19 ENCOUNTER — APPOINTMENT (OUTPATIENT)
Dept: CARDIOLOGY | Facility: CLINIC | Age: 39
End: 2024-09-19

## 2024-09-19 VITALS
RESPIRATION RATE: 18 BRPM | TEMPERATURE: 97 F | OXYGEN SATURATION: 100 % | SYSTOLIC BLOOD PRESSURE: 120 MMHG | DIASTOLIC BLOOD PRESSURE: 60 MMHG | HEART RATE: 89 BPM

## 2024-09-19 PROCEDURE — 99239 HOSP IP/OBS DSCHRG MGMT >30: CPT

## 2024-09-19 RX ORDER — CYCLOBENZAPRINE HCL 10 MG
5 TABLET ORAL ONCE
Refills: 0 | Status: COMPLETED | OUTPATIENT
Start: 2024-09-19 | End: 2024-09-19

## 2024-09-19 RX ORDER — CYCLOBENZAPRINE HCL 10 MG
1 TABLET ORAL
Qty: 0 | Refills: 0 | DISCHARGE
Start: 2024-09-19

## 2024-09-19 RX ORDER — DIPHENHYDRAMINE HCL 50 MG
1 CAPSULE ORAL
Qty: 0 | Refills: 0 | DISCHARGE
Start: 2024-09-19

## 2024-09-19 RX ORDER — MAGNESIUM, ALUMINUM HYDROXIDE 200-225/5
30 SUSPENSION, ORAL (FINAL DOSE FORM) ORAL
Qty: 0 | Refills: 0 | DISCHARGE
Start: 2024-09-19

## 2024-09-19 RX ORDER — ACETAMINOPHEN 325 MG/1
2 TABLET ORAL
Qty: 0 | Refills: 0 | DISCHARGE
Start: 2024-09-19

## 2024-09-19 RX ORDER — SUCRALFATE 1 G/10ML
1 SUSPENSION ORAL
Qty: 0 | Refills: 0 | DISCHARGE
Start: 2024-09-19

## 2024-09-19 RX ORDER — DIPHENHYDRAMINE HCL 50 MG
50 CAPSULE ORAL ONCE
Refills: 0 | Status: COMPLETED | OUTPATIENT
Start: 2024-09-19 | End: 2024-09-19

## 2024-09-19 RX ADMIN — Medication 25 MILLIGRAM(S): at 11:54

## 2024-09-19 RX ADMIN — CHLORHEXIDINE GLUCONATE 1 APPLICATION(S): 40 SOLUTION TOPICAL at 05:29

## 2024-09-19 RX ADMIN — ESCITALOPRAM OXALATE 20 MILLIGRAM(S): 10 TABLET ORAL at 11:55

## 2024-09-19 RX ADMIN — Medication 5 MILLIGRAM(S): at 11:52

## 2024-09-19 RX ADMIN — Medication 100 MICROGRAM(S): at 04:22

## 2024-09-19 RX ADMIN — Medication 50 MILLIGRAM(S): at 12:43

## 2024-09-19 RX ADMIN — NALTREXONE HYDROCHLORIDE 50 MILLIGRAM(S): 50 TABLET, FILM COATED ORAL at 11:54

## 2024-09-19 RX ADMIN — APIXABAN 5 MILLIGRAM(S): 5 TABLET, FILM COATED ORAL at 05:21

## 2024-09-19 RX ADMIN — SUCRALFATE 1 GRAM(S): 1 SUSPENSION ORAL at 05:21

## 2024-09-19 RX ADMIN — DIVALPROEX SODIUM 750 MILLIGRAM(S): 125 CAPSULE, DELAYED RELEASE ORAL at 11:51

## 2024-09-19 NOTE — CHART NOTE - NSCHARTNOTEFT_GEN_A_CORE
39 year-old right-handed woman with a PMH of obesity, factor V Leiden deficiency a/w DVT/PE (on Eliquis 5mg BID), bipolar disorder, hypothyroidism, PNES, IBS, GERD, PID, endometriosis, asthma, personality disorder, and mild intellectual disability who presented to [facility] ED from [facility] on 9/12/24 as code stroke.     Informed by RN that patient is unable to life bilateral lower extremities during neuro check.     Patient seen and assessed at bedside. Patient states that she has been unable to lift both legs since admission. Patient states her symptoms have been the same since admission. Patient admits to intermittent numbness and tingling in the left legs which has been same since admission.     [facility] MEDICINE NIGHT COVERAGE - Medicine Subsequent Hospital Care Note    CC:  HPI/Subjective:    ROS:  Denies fever, chills, diaphoresis, malaise, night sweats, generalized weakness, headache, changes in vision, dizziness, cough, sputum production, wheezing, hemoptysis, chest pain, palpitations, shortness of breath, dyspnea on exertion, PND, dysphagia, new abdominal pain, nausea, vomiting, diarrhea, constipation, melena, hematochezia, dysuria, increased urinary frequency/urgency, hematuria, nocturia, numbness/weakness/tingling, any other complaints.    [  ] I spoke with the attending, nurse, and family to obtain the history.  [  ] Unable to obtain history due to ___________.    Vital Signs Last 24 Hrs  T(C): 37 (09-19-24 @ 01:22), Max: 37 (09-18-24 @ 21:22)  T(F): 98.6 (09-19-24 @ 01:22), Max: 98.6 (09-18-24 @ 21:22)  HR: 93 (09-19-24 @ 01:22) (81 - 99)  BP: 104/69 (09-19-24 @ 01:22) (103/58 - 117/73)  RR: 18 (09-19-24 @ 01:22) (17 - 18)  SpO2: 99% (09-19-24 @ 01:22) (96% - 100%) on (O2)    PHYSICAL EXAM:  Constitutional: NAD, well-developed, well-nourished  Ears, nose, Mouth, and Throat: normal external ears and nose, normal hearing, moist oral mucosa  Eyes: normal conjunctiva, EOMI, PEERL  Neck: supple, no JVD  Respiratory: Clear to auscultation bilaterally. No wheezes, rales or rhonchi. Normal respiratory effort  Cardiovascular: regular rate and rhythm, S1 and S2, no murmurs, rubs or gallops, no edema, 2+ peripheral pulses  Gastrointestinal: soft, nontender, nondistended, +bowel sounds, no hernia  Skin: warm, dry, no rash  Neurologic: sensation grossly intact, CN grossly intact, non-focal exam  Musculoskeletal: no clubbing, no cyanosis, no joint swelling  Psychiatric: A&Ox3, appropriate mood, affect    LABS:                        10.5   5.70  )-----------( 117      ( 17 Sep 2024 04:45 )             32.1     09-17    140  |  103  |  14  ----------------------------<  92  4.2   |  23  |  0.72    Ca    8.7      17 Sep 2024 04:45  Phos  4.1     09-17  Mg     1.80     09-17      PT/INR: PT: 12.1 sec | INR: 1.07 ratio (09-12-24 @ 12:30)  PTT: 29.2 sec (09-12-24 @ 12:30)    CARDIAC ENZYMES            Serum Pro-Brain Natriuretic Peptide:   D-Dimer Assay:     Urinanalysis Basic (09-19-24 @ 04:28):  Color: -- / Appearance: -- / SG: -- / pH: --  Gluc: 92 / Ketone: -- / Bili: -- / Urobili: --  Blood: -- / Protein: -- / Nitrite: --  Leuk Esterase: -- / RBC: -- / WBC: --  Sq Epi: -- / Non Sq Epi: -- / Bacteria: --      Blood Gas Venous (09-19-24 @ 04:28):  pH: 7.34 | HCO3: 28 | pCO2: 51 | pO2: 37 | Lactate: 3.1  pH: 7.39 | HCO3: 25 | pCO2: 41 | pO2: 71 | Lactate: 1.3      Blood Gas Arterial (09-19-24 @ 04:28):      CAPILLARY BLOOD GLUCOSE:  POCT Blood Glucose: 107 mg/dL (09-12-24 @ 17:02)  POCT Blood Glucose: 142 mg/dL (09-12-24 @ 12:11)  POCT Blood Glucose: 115 mg/dL (09-09-24 @ 23:36)      COVID PCR:  Jessikatec (09-18-24 @ 16:09)      RADIOLOGY:    MEDICATIONS  (STANDING):  apixaban 5 milliGRAM(s) Oral two times a day  atorvastatin 80 milliGRAM(s) Oral at bedtime  chlorhexidine 2% Cloths 1 Application(s) Topical <User Schedule>  cloNIDine 0.1 milliGRAM(s) Oral at bedtime  diphenhydrAMINE 50 milliGRAM(s) Oral at bedtime  divalproex  milliGRAM(s) Oral <User Schedule>  escitalopram 20 milliGRAM(s) Oral daily  influenza   Vaccine 0.5 milliLiter(s) IntraMuscular once  levothyroxine 100 MICROGram(s) Oral daily  melatonin 3 milliGRAM(s) Oral at bedtime  naltrexone 50 milliGRAM(s) Oral daily  QUEtiapine 200 milliGRAM(s) Oral at bedtime  QUEtiapine 25 milliGRAM(s) Oral daily  risperiDONE   Tablet 3 milliGRAM(s) Oral at bedtime  sucralfate 1 Gram(s) Oral two times a day    MEDICATIONS  (PRN):  acetaminophen     Tablet .. 650 milliGRAM(s) Oral every 6 hours PRN Temp greater or equal to 38C (100.4F), Mild Pain (1 - 3)  aluminum hydroxide/magnesium hydroxide/simethicone Suspension 30 milliLiter(s) Oral every 4 hours PRN Dyspepsia  cyclobenzaprine 5 milliGRAM(s) Oral two times a day PRN Muscle Spasm  diphenhydrAMINE 25 milliGRAM(s) Oral every 6 hours PRN Rash and/or Itching    I&O's Summary    I reviewed the above labs, radiology, medications, tests, telemetry, and EKG interpretation.    ASSESSMENT/PLAN:    - Clinical findings, labs, tests, telemetry, and ekg reviewed with attending. Will monitor patient closely.     Hodan Smith PA-C  [facility] [id number] [facility] MEDICINE NIGHT COVERAGE     Patient is a 39 year-old right-handed woman with a PMH of obesity, factor V Leiden deficiency a/w DVT/PE (on Eliquis 5mg BID), bipolar disorder, hypothyroidism, PNES, IBS, GERD, PID, endometriosis, asthma, personality disorder, and mild intellectual disability who presented to [facility] ED from senior living on 9/12/24 as code stroke.     Informed by RN that patient is unable to lift bilateral lower extremities during neuro check.     Patient seen and assessed at bedside. Patient states that she has been unable to lift both legs since admission. Patient states her symptoms have been the same since admission. Patient admits to intermittent numbness and tingling in the left legs which has been same since admission.    ROS:  Denies fever, chills, headache, changes in vision, dizziness, nausea, vomiting, diarrhea, constipation, chest pain or shortness of breath    Vital Signs Last 24 Hrs  T(C): 37 (09-19-24 @ 01:22), Max: 37 (09-18-24 @ 21:22)  T(F): 98.6 (09-19-24 @ 01:22), Max: 98.6 (09-18-24 @ 21:22)  HR: 93 (09-19-24 @ 01:22) (81 - 99)  BP: 104/69 (09-19-24 @ 01:22) (103/58 - 117/73)  RR: 18 (09-19-24 @ 01:22) (17 - 18)  SpO2: 99% (09-19-24 @ 01:22) (96% - 100%) on (O2)    PHYSICAL EXAM:  Constitutional: NAD, well-developed, well-nourished  Eyes: normal conjunctiva, EOMI, PEERL  Cardiovascular: regular rate and rhythm  Cranial Nerves: face symmetric, patient able to raise b/l eyebrows, extraocular muscles intact, tongue midline, shoulder shrug 5/5 strength b/l  Neurologic: 5/5 strength and sensation of b/l RUE,  decreased sensation in b/l lower extremity, RLE can slightly move with effort however, unable to hold up. Left lower extremity unable to move. b/l lower extremities with decreased sensation to light touch and pinprick. LLE with more decreased sensation than RLE.  Musculoskeletal: no clubbing, no cyanosis, no joint swelling  Psychiatric: A&Ox3, appropriate mood, affect    LABS:                        10.5   5.70  )-----------( 117      ( 17 Sep 2024 04:45 )             32.1     09-17    140  |  103  |  14  ----------------------------<  92  4.2   |  23  |  0.72    Ca    8.7      17 Sep 2024 04:45  Phos  4.1     09-17  Mg     1.80     09-17      PT/INR: PT: 12.1 sec | INR: 1.07 ratio (09-12-24 @ 12:30)  PTT: 29.2 sec (09-12-24 @ 12:30)    CARDIAC ENZYMES            Serum Pro-Brain Natriuretic Peptide:   D-Dimer Assay:     Urinanalysis Basic (09-19-24 @ 04:28):  Color: -- / Appearance: -- / SG: -- / pH: --  Gluc: 92 / Ketone: -- / Bili: -- / Urobili: --  Blood: -- / Protein: -- / Nitrite: --  Leuk Esterase: -- / RBC: -- / WBC: --  Sq Epi: -- / Non Sq Epi: -- / Bacteria: --      Blood Gas Venous (09-19-24 @ 04:28):  pH: 7.34 | HCO3: 28 | pCO2: 51 | pO2: 37 | Lactate: 3.1  pH: 7.39 | HCO3: 25 | pCO2: 41 | pO2: 71 | Lactate: 1.3      Blood Gas Arterial (09-19-24 @ 04:28):      CAPILLARY BLOOD GLUCOSE:  POCT Blood Glucose: 107 mg/dL (09-12-24 @ 17:02)  POCT Blood Glucose: 142 mg/dL (09-12-24 @ 12:11)  POCT Blood Glucose: 115 mg/dL (09-09-24 @ 23:36)      COVID PCR:  NotDetec (09-18-24 @ 16:09)      RADIOLOGY:    MEDICATIONS  (STANDING):  apixaban 5 milliGRAM(s) Oral two times a day  atorvastatin 80 milliGRAM(s) Oral at bedtime  chlorhexidine 2% Cloths 1 Application(s) Topical <User Schedule>  cloNIDine 0.1 milliGRAM(s) Oral at bedtime  diphenhydrAMINE 50 milliGRAM(s) Oral at bedtime  divalproex  milliGRAM(s) Oral <User Schedule>  escitalopram 20 milliGRAM(s) Oral daily  influenza   Vaccine 0.5 milliLiter(s) IntraMuscular once  levothyroxine 100 MICROGram(s) Oral daily  melatonin 3 milliGRAM(s) Oral at bedtime  naltrexone 50 milliGRAM(s) Oral daily  QUEtiapine 200 milliGRAM(s) Oral at bedtime  QUEtiapine 25 milliGRAM(s) Oral daily  risperiDONE   Tablet 3 milliGRAM(s) Oral at bedtime  sucralfate 1 Gram(s) Oral two times a day    MEDICATIONS  (PRN):  acetaminophen     Tablet .. 650 milliGRAM(s) Oral every 6 hours PRN Temp greater or equal to 38C (100.4F), Mild Pain (1 - 3)  aluminum hydroxide/magnesium hydroxide/simethicone Suspension 30 milliLiter(s) Oral every 4 hours PRN Dyspepsia  cyclobenzaprine 5 milliGRAM(s) Oral two times a day PRN Muscle Spasm  diphenhydrAMINE 25 milliGRAM(s) Oral every 6 hours PRN Rash and/or Itching    I&O's Summary    I reviewed the above labs, radiology, medications, tests, telemetry, and EKG interpretation.    ASSESSMENT/PLAN:  Patient is a 39 year-old right-handed woman with a PMH of obesity, factor V Leiden deficiency a/w DVT/PE (on Eliquis 5mg BID), bipolar disorder, hypothyroidism, PNES, IBS, GERD, PID, endometriosis, asthma, personality disorder, and mild intellectual disability who presented to [facility] ED from senior living on 9/12/24 as code stroke. Informed by RN that patient is unable to lift bilateral lower extremities during neuro check.  Patient states that the reported symptoms have been the same and unchanged since admission.    -Writer's findings are consistent with neurology findings documented on 9/12  -Continue with q4 vitals   -Continue with q4 neuro checks  -Will monitor patient closely       Hodan Smith PA-C  [facility] [id number]

## 2024-09-19 NOTE — DISCHARGE NOTE NURSING/CASE MANAGEMENT/SOCIAL WORK - PATIENT PORTAL LINK FT
You can access the FollowMyHealth Patient Portal offered by [facility] by registering at the following website: [email or web address]. By joining [employer]’s FollowMyHealth portal, you will also be able to view your health information using other applications (apps) compatible with our system.

## 2024-09-19 NOTE — DIETITIAN INITIAL EVALUATION ADULT - OTHER INFO
Patient seen at bedside. Pt reported good PO intake in house. Per RN flowsheet, Pt with good PO intkae % noted. No food preferences verbalized at this time. Patient denies any nausea, vomiting, diarrhea, constipation or difficulty chewing and swallowing at present. Per chart, last BM 9/17. No bowel regimen at this time. Unable to access [facility]. As per previous RD note 6/26/22; .2kg. ABW 134kg (9/12). Weight gain over 2 years (9.6%). Obtain weekly weights to monitor weight trend.

## 2024-09-19 NOTE — PROVIDER CONTACT NOTE (OTHER) - ASSESSMENT
Patient denies any pain, discomfort, or SOB. When patient's legs are raised by RN, the extremities show no strength and immediately drop. Patient states she can't lift her legs by herself.

## 2024-09-19 NOTE — DISCHARGE NOTE NURSING/CASE MANAGEMENT/SOCIAL WORK - NSDCCRNAME_GEN_ALL_CORE_FT
Take antibiotics as prescribed for left ear infection.  Continue to monitor symptoms closely and if still continue to have seem to be bothered by abdominal pain, please be sure to follow up.  Patient Education     Diagnosing Middle Ear Problems     The healthcare provider checks your child's eardrum with a pneumatic otoscope.   Diagnosing a middle ear problem takes several steps. You may be asked questions about your child s health history. Your child s eardrums will be examined. Tests may be done to check the health of the middle ear. Other tests may be done to check for hearing loss. Below is more information about the exam and tests.   Physical exam  A physical exam helps figure out the type of ear problem your child may have. The exam will also help identify respiratory illnesses. These can include bronchitis, pneumonia, or strep throat. They can affect middle ear health and hearing. The exam includes listening to your child s heart and lungs. The healthcare provider will look in your child s ears, nose, and throat.   Viewing the eardrum  A test called pneumatic otoscopy may be done. It takes a few minutes. In most cases it does not cause any pain. A special device (otoscope) is used to look down the ear canal. This lets the healthcare provider see the eardrum and any fluid behind it. The device can also be used to change the air pressure in the ear canal. This lets the provider see how flexible the eardrum is. Reduced eardrum flexibility is often linked with fluid buildup.   Checking the middle ear  Your child s eardrum and middle ear may be tested. Tympanometry and acoustic reflex testing may be done. Both use a probe to send air and sound through the outer ear. Tympanometry measures the sound passed into the middle ear. Acoustic reflex testing checks the flexibility of the eardrum and how it responds to loud sounds.   Identifying hearing loss  Older children may be given an audiometric test. This measures any  possible hearing loss. Test results are used to identify the types of sounds that can and can t be heard. If your child is young, the healthcare provider or a hearing specialist may talk or play with them. The child s response helps identify hearing loss.   [employer] last reviewed this educational content on 9/1/2019 2000-2021 [employer]. All rights reserved. This information is not intended as a substitute for professional medical care. Always follow your healthcare professional's instructions.            [facility] [address], [place] [address]

## 2024-09-19 NOTE — DISCHARGE NOTE NURSING/CASE MANAGEMENT/SOCIAL WORK - NSDCFUADDAPPT_GEN_ALL_CORE_FT
Please follow-up with NSGY after obtaining MRI brain wwo contrast outpatient for pituitary lesion  [facility] Neurosurgery  [address], [address], [place] [address]  Phone: [phone]

## 2024-09-19 NOTE — DIETITIAN INITIAL EVALUATION ADULT - OTHER CALCULATIONS
Defer fluid needs to MD/Team.   Ideal Body Weight: 140lbs / 63.6kg +10%. Upper-end of IBW used to calculate nutritional estimated needs.

## 2024-09-19 NOTE — PROVIDER CONTACT NOTE (OTHER) - BACKGROUND
39 year-old with a PMH of obesity, factor V Leiden deficiency a/w DVT/PE (on Eliquis 5mg BID), bipolar disorder, hypothyroidism, PNES, IBS, GERD, PID, endometriosis, asthma, personality disorder

## 2024-09-19 NOTE — DIETITIAN INITIAL EVALUATION ADULT - PERTINENT MEDS FT
MEDICATIONS  (STANDING):  apixaban 5 milliGRAM(s) Oral two times a day  atorvastatin 80 milliGRAM(s) Oral at bedtime  chlorhexidine 2% Cloths 1 Application(s) Topical <User Schedule>  cloNIDine 0.1 milliGRAM(s) Oral at bedtime  diphenhydrAMINE 50 milliGRAM(s) Oral at bedtime  divalproex  milliGRAM(s) Oral <User Schedule>  escitalopram 20 milliGRAM(s) Oral daily  influenza   Vaccine 0.5 milliLiter(s) IntraMuscular once  levothyroxine 100 MICROGram(s) Oral daily  melatonin 3 milliGRAM(s) Oral at bedtime  naltrexone 50 milliGRAM(s) Oral daily  QUEtiapine 200 milliGRAM(s) Oral at bedtime  QUEtiapine 25 milliGRAM(s) Oral daily  risperiDONE   Tablet 3 milliGRAM(s) Oral at bedtime  sucralfate 1 Gram(s) Oral two times a day    MEDICATIONS  (PRN):  acetaminophen     Tablet .. 650 milliGRAM(s) Oral every 6 hours PRN Temp greater or equal to 38C (100.4F), Mild Pain (1 - 3)  aluminum hydroxide/magnesium hydroxide/simethicone Suspension 30 milliLiter(s) Oral every 4 hours PRN Dyspepsia  cyclobenzaprine 5 milliGRAM(s) Oral two times a day PRN Muscle Spasm  diphenhydrAMINE 25 milliGRAM(s) Oral every 6 hours PRN Rash and/or Itching

## 2024-09-19 NOTE — PROVIDER CONTACT NOTE (OTHER) - RECOMMENDATIONS
ALEXANDRIA Smith was notified. Neuro checks were marked appropriately on flowsheet; motor function section now showcases an accurate finding.

## 2024-09-19 NOTE — DIETITIAN INITIAL EVALUATION ADULT - REASON FOR ADMISSION
Per chart, Pt is 38F PMH of explosive disorder, mild intellectual disability, CVA (in 2018 on Eliquis), factor V Leiden deficiency c/b DVT/PE, hypothyroid, GERD who presented for evaluation of bilateral lower extremity numbness.

## 2024-09-19 NOTE — PROVIDER CONTACT NOTE (OTHER) - ACTION/TREATMENT ORDERED:
ACP Sarah, as well as two other ACPs on the floor, came to bedside to perform their own assessment. Their findings are identical to that of RN (Julio's). Continue to monitor patient's neuro function.

## 2024-09-19 NOTE — DIETITIAN INITIAL EVALUATION ADULT - ORAL INTAKE PTA/DIET HISTORY
Patient reports generally good appetite/PO intake PTA, consumes a regular diet with three meals at baseline. Pt confirms allergy to pineapple nutrition department aware. Pt denies any recent weight changes. Stated height 5 feet and 8 inches.

## 2024-09-20 ENCOUNTER — APPOINTMENT (OUTPATIENT)
Dept: PEDIATRIC ALLERGY IMMUNOLOGY | Facility: CLINIC | Age: 39
End: 2024-09-20

## 2024-09-23 ENCOUNTER — APPOINTMENT (OUTPATIENT)
Dept: NEUROLOGY | Facility: CLINIC | Age: 39
End: 2024-09-23

## 2024-09-25 ENCOUNTER — EMERGENCY (EMERGENCY)
Facility: HOSPITAL | Age: 39
LOS: 1 days | Discharge: HOME | End: 2024-09-25
Attending: EMERGENCY MEDICINE | Admitting: EMERGENCY MEDICINE
Payer: MEDICARE

## 2024-09-25 VITALS
DIASTOLIC BLOOD PRESSURE: 79 MMHG | TEMPERATURE: 98 F | WEIGHT: 250 LBS | SYSTOLIC BLOOD PRESSURE: 120 MMHG | OXYGEN SATURATION: 97 % | HEIGHT: 71 IN | RESPIRATION RATE: 18 BRPM | HEART RATE: 98 BPM

## 2024-09-25 PROCEDURE — 99284 EMERGENCY DEPT VISIT MOD MDM: CPT

## 2024-09-25 PROCEDURE — 93010 ELECTROCARDIOGRAM REPORT: CPT

## 2024-09-26 VITALS
DIASTOLIC BLOOD PRESSURE: 82 MMHG | TEMPERATURE: 98 F | OXYGEN SATURATION: 98 % | HEART RATE: 91 BPM | SYSTOLIC BLOOD PRESSURE: 118 MMHG | RESPIRATION RATE: 20 BRPM

## 2024-09-26 PROCEDURE — 73560 X-RAY EXAM OF KNEE 1 OR 2: CPT | Mod: 26,RT

## 2024-09-26 PROCEDURE — 93970 EXTREMITY STUDY: CPT | Mod: 26

## 2024-09-26 RX ORDER — CYCLOBENZAPRINE HYDROCHLORIDE 15 MG/1
10 CAPSULE, EXTENDED RELEASE ORAL ONCE
Refills: 0 | Status: COMPLETED | OUTPATIENT
Start: 2024-09-26 | End: 2024-09-26

## 2024-09-26 RX ORDER — DIPHENHYDRAMINE HCL 12.5MG/5ML
50 ELIXIR ORAL ONCE
Refills: 0 | Status: COMPLETED | OUTPATIENT
Start: 2024-09-26 | End: 2024-09-26

## 2024-09-26 RX ORDER — ACETAMINOPHEN 500 MG/5ML
975 LIQUID (ML) ORAL ONCE
Refills: 0 | Status: COMPLETED | OUTPATIENT
Start: 2024-09-26 | End: 2024-09-26

## 2024-09-26 RX ADMIN — Medication 50 MILLIGRAM(S): at 03:13

## 2024-09-26 RX ADMIN — CYCLOBENZAPRINE HYDROCHLORIDE 10 MILLIGRAM(S): 15 CAPSULE, EXTENDED RELEASE ORAL at 02:45

## 2024-09-26 RX ADMIN — CYCLOBENZAPRINE HYDROCHLORIDE 10 MILLIGRAM(S): 15 CAPSULE, EXTENDED RELEASE ORAL at 04:10

## 2024-09-28 ENCOUNTER — NON-APPOINTMENT (OUTPATIENT)
Age: 39
End: 2024-09-28

## 2024-09-29 NOTE — BH CONSULTATION LIAISON ASSESSMENT NOTE - NSSUICPROTFACT_PSY_ALL_CORE
58 y/o male, PMH ETOH abuse, gastritis, PUD, hx of hypoxic respiratory failure requiring intubation due to aspiration PNA (most recent intubation Aril 2020), presented to [facility] on 9/13 c/o etoh w/d, abd pain, N/V. LFTs began increasing, started on NAC gtt. Pt was transferred to [facility] on 9/15 for evaluation of acute liver failure by Hepatology.     Admitted to the MICU, was on NAC gtt from 9/15-9/23. Per hepatology, pt is unlikely a candidate for LT due to multiple admissions in the past with alcohol withdrawal. Pt was intubated on 9/17, extubated 9/23. Pt received PLEX x3, mannitol, hypertonic saline for hyperammonemia and cerebral edema. Pt also given lactulose, ammonia down to 42. Mental status improved, transferred to floor. Continue regiment.   Monitor Hgb--> stable  Patient with RUE swelling/hematoma--> elevate/compress. Hold AC.   On oral diet and tube feeds in the MICU as poor po intake. Will attempt to wean off tube. S+S pending.     Patient's abdomen is more distended. Currently on Lactulose and Miralax. U/S abdomen ordered and expedited  Will trial albumin as BP has been borderline.   Hepatology following--> followup further recs. Responsibility to children, family, or others/Identifies reasons for living/Supportive social network of family or friends/Positive therapeutic relationships

## 2024-10-04 ENCOUNTER — NON-APPOINTMENT (OUTPATIENT)
Age: 39
End: 2024-10-04

## 2024-10-07 ENCOUNTER — EMERGENCY (EMERGENCY)
Facility: HOSPITAL | Age: 39
LOS: 1 days | Discharge: HOME | End: 2024-10-07
Attending: EMERGENCY MEDICINE
Payer: MEDICARE

## 2024-10-07 VITALS
DIASTOLIC BLOOD PRESSURE: 78 MMHG | HEART RATE: 99 BPM | TEMPERATURE: 98 F | RESPIRATION RATE: 18 BRPM | OXYGEN SATURATION: 99 % | SYSTOLIC BLOOD PRESSURE: 111 MMHG

## 2024-10-07 VITALS
HEIGHT: 71 IN | SYSTOLIC BLOOD PRESSURE: 106 MMHG | WEIGHT: 279.99 LBS | TEMPERATURE: 98 F | OXYGEN SATURATION: 96 % | HEART RATE: 100 BPM | RESPIRATION RATE: 18 BRPM | DIASTOLIC BLOOD PRESSURE: 73 MMHG

## 2024-10-07 LAB
ALBUMIN SERPL ELPH-MCNC: 3.8 G/DL — SIGNIFICANT CHANGE UP (ref 3.5–5)
ALP SERPL-CCNC: 72 U/L — SIGNIFICANT CHANGE UP (ref 40–120)
ALT FLD-CCNC: 72 U/L DA — HIGH (ref 10–60)
ANION GAP SERPL CALC-SCNC: 7 MMOL/L — SIGNIFICANT CHANGE UP (ref 5–17)
AST SERPL-CCNC: 26 U/L — SIGNIFICANT CHANGE UP (ref 10–40)
BASOPHILS # BLD AUTO: 0.01 K/UL — SIGNIFICANT CHANGE UP (ref 0–0.2)
BASOPHILS NFR BLD AUTO: 0.2 % — SIGNIFICANT CHANGE UP (ref 0–2)
BILIRUB SERPL-MCNC: 0.2 MG/DL — SIGNIFICANT CHANGE UP (ref 0.2–1.2)
BUN SERPL-MCNC: 13 MG/DL — SIGNIFICANT CHANGE UP (ref 7–18)
CALCIUM SERPL-MCNC: 9.3 MG/DL — SIGNIFICANT CHANGE UP (ref 8.4–10.5)
CHLORIDE SERPL-SCNC: 106 MMOL/L — SIGNIFICANT CHANGE UP (ref 96–108)
CO2 SERPL-SCNC: 25 MMOL/L — SIGNIFICANT CHANGE UP (ref 22–31)
CREAT SERPL-MCNC: 0.81 MG/DL — SIGNIFICANT CHANGE UP (ref 0.5–1.3)
D DIMER BLD IA.RAPID-MCNC: <150 NG/ML DDU — SIGNIFICANT CHANGE UP
EGFR: 96 ML/MIN/1.73M2 — SIGNIFICANT CHANGE UP
EOSINOPHIL # BLD AUTO: 0.03 K/UL — SIGNIFICANT CHANGE UP (ref 0–0.5)
EOSINOPHIL NFR BLD AUTO: 0.5 % — SIGNIFICANT CHANGE UP (ref 0–6)
GLUCOSE SERPL-MCNC: 93 MG/DL — SIGNIFICANT CHANGE UP (ref 70–99)
HCG SERPL-ACNC: <1 MIU/ML — SIGNIFICANT CHANGE UP
HCT VFR BLD CALC: 34.2 % — LOW (ref 34.5–45)
HGB BLD-MCNC: 11.3 G/DL — LOW (ref 11.5–15.5)
IMM GRANULOCYTES NFR BLD AUTO: 0.5 % — SIGNIFICANT CHANGE UP (ref 0–0.9)
LYMPHOCYTES # BLD AUTO: 2.19 K/UL — SIGNIFICANT CHANGE UP (ref 1–3.3)
LYMPHOCYTES # BLD AUTO: 38.2 % — SIGNIFICANT CHANGE UP (ref 13–44)
MCHC RBC-ENTMCNC: 31.7 PG — SIGNIFICANT CHANGE UP (ref 27–34)
MCHC RBC-ENTMCNC: 33 GM/DL — SIGNIFICANT CHANGE UP (ref 32–36)
MCV RBC AUTO: 95.8 FL — SIGNIFICANT CHANGE UP (ref 80–100)
MONOCYTES # BLD AUTO: 0.55 K/UL — SIGNIFICANT CHANGE UP (ref 0–0.9)
MONOCYTES NFR BLD AUTO: 9.6 % — SIGNIFICANT CHANGE UP (ref 2–14)
NEUTROPHILS # BLD AUTO: 2.92 K/UL — SIGNIFICANT CHANGE UP (ref 1.8–7.4)
NEUTROPHILS NFR BLD AUTO: 51 % — SIGNIFICANT CHANGE UP (ref 43–77)
NRBC # BLD: 0 /100 WBCS — SIGNIFICANT CHANGE UP (ref 0–0)
PLATELET # BLD AUTO: 122 K/UL — LOW (ref 150–400)
POTASSIUM SERPL-MCNC: 4.5 MMOL/L — SIGNIFICANT CHANGE UP (ref 3.5–5.3)
POTASSIUM SERPL-SCNC: 4.5 MMOL/L — SIGNIFICANT CHANGE UP (ref 3.5–5.3)
PROT SERPL-MCNC: 7.5 G/DL — SIGNIFICANT CHANGE UP (ref 6–8.3)
RBC # BLD: 3.57 M/UL — LOW (ref 3.8–5.2)
RBC # FLD: 14.8 % — HIGH (ref 10.3–14.5)
SODIUM SERPL-SCNC: 138 MMOL/L — SIGNIFICANT CHANGE UP (ref 135–145)
TROPONIN I, HIGH SENSITIVITY RESULT: <3 NG/L — SIGNIFICANT CHANGE UP
WBC # BLD: 5.73 K/UL — SIGNIFICANT CHANGE UP (ref 3.8–10.5)
WBC # FLD AUTO: 5.73 K/UL — SIGNIFICANT CHANGE UP (ref 3.8–10.5)

## 2024-10-07 PROCEDURE — 93010 ELECTROCARDIOGRAM REPORT: CPT

## 2024-10-07 PROCEDURE — 99285 EMERGENCY DEPT VISIT HI MDM: CPT

## 2024-10-07 PROCEDURE — 71045 X-RAY EXAM CHEST 1 VIEW: CPT | Mod: 26

## 2024-10-07 RX ORDER — CYCLOBENZAPRINE HCL 10 MG
20 TABLET ORAL ONCE
Refills: 0 | Status: COMPLETED | OUTPATIENT
Start: 2024-10-07 | End: 2024-10-07

## 2024-10-07 RX ORDER — CYCLOBENZAPRINE HCL 10 MG
10 TABLET ORAL ONCE
Refills: 0 | Status: COMPLETED | OUTPATIENT
Start: 2024-10-07 | End: 2024-10-07

## 2024-10-07 RX ORDER — ACETAMINOPHEN 325 MG
650 TABLET ORAL ONCE
Refills: 0 | Status: COMPLETED | OUTPATIENT
Start: 2024-10-07 | End: 2024-10-07

## 2024-10-07 RX ORDER — DIPHENHYDRAMINE HCL 12.5MG/5ML
25 LIQUID (ML) ORAL ONCE
Refills: 0 | Status: COMPLETED | OUTPATIENT
Start: 2024-10-07 | End: 2024-10-07

## 2024-10-07 RX ADMIN — Medication 650 MILLIGRAM(S): at 11:47

## 2024-10-07 RX ADMIN — Medication 25 MILLIGRAM(S): at 05:36

## 2024-10-07 RX ADMIN — Medication 20 MILLIGRAM(S): at 08:20

## 2024-10-07 RX ADMIN — Medication 10 MILLIGRAM(S): at 05:36

## 2024-10-07 RX ADMIN — Medication 25 MILLIGRAM(S): at 08:19

## 2024-10-07 NOTE — ED PROVIDER NOTE - PATIENT PORTAL LINK FT
You can access the FollowMyHealth Patient Portal offered by [facility] by registering at the following website: [email or web address]. By joining [employer]’s FollowMyHealth portal, you will also be able to view your health information using other applications (apps) compatible with our system.

## 2024-10-07 NOTE — ED PROVIDER NOTE - CLINICAL SUMMARY MEDICAL DECISION MAKING FREE TEXT BOX
ACS ruled out as EKG has no ischemic changes or evidence of STEMI, high-sensitivity troponin is negative(greater than 4 hrs from symptoms)  PE ruled out as D-dimer is negative.  Chest x-ray shows no pneumothorax or pulmonary pathology.   Pt is well appearing, has no new complaints.. Pt is stable for discharge and follow up with medical doctor. Pt educated on care and need for follow up. Discussed anticipatory guidance and return precautions. Questions answered. I had a detailed discussion with the patient regarding the historical points, exam findings, and any diagnostic results supporting the discharge diagnosis.   Safe transport arranged for pt back to SNF.

## 2024-10-07 NOTE — ED PROVIDER NOTE - OBJECTIVE STATEMENT
39-year-old female transferred from [facility].  Patient with baseline lower extremity weakness.  Patient states developed chest pain at 8 PM last night.  No reported shortness of breath, no nausea, no vomiting, no productive coughing.  Patient states has baseline generalized muscle spasms which requires 30 mg of Flexeril.  Patient also states she takes Benadryl 50 mg every 4 hours for allergic symptoms.

## 2024-10-07 NOTE — ED PROVIDER NOTE - NSFOLLOWUPCLINICS_GEN_ALL_ED_FT
[facility]  Internal Medicine  [address] [place] [address]  Phone: [phone]  Fax: [phone]

## 2024-10-07 NOTE — ED ADULT NURSE NOTE - NSFALLHARMRISKINTERV_ED_ALL_ED

## 2024-10-07 NOTE — ED PROVIDER NOTE - NSFOLLOWUPINSTRUCTIONS_ED_ALL_ED_FT
Return to ER immediately if your chest pain returns, if you have pain with radiation to arms, back or neck, if you feel short of breath, feel weak, feel fast or pounding heart beating, if you have any fever or vomiting.  If you need assistance with follow up appointments our Care Coordinator can be reached at [phone]. In addition the [facility] is located at [address] [place], [place] [id number], tel: [phone].

## 2024-10-08 ENCOUNTER — NON-APPOINTMENT (OUTPATIENT)
Age: 39
End: 2024-10-08

## 2024-10-11 ENCOUNTER — NON-APPOINTMENT (OUTPATIENT)
Age: 39
End: 2024-10-11

## 2024-10-14 ENCOUNTER — EMERGENCY (EMERGENCY)
Facility: HOSPITAL | Age: 39
LOS: 1 days | Discharge: HOME | End: 2024-10-14
Attending: EMERGENCY MEDICINE
Payer: MEDICARE

## 2024-10-14 ENCOUNTER — NON-APPOINTMENT (OUTPATIENT)
Age: 39
End: 2024-10-14

## 2024-10-14 VITALS
WEIGHT: 293 LBS | HEIGHT: 71 IN | DIASTOLIC BLOOD PRESSURE: 85 MMHG | HEART RATE: 98 BPM | TEMPERATURE: 98 F | RESPIRATION RATE: 16 BRPM | SYSTOLIC BLOOD PRESSURE: 130 MMHG | OXYGEN SATURATION: 98 %

## 2024-10-14 LAB
ALBUMIN SERPL ELPH-MCNC: 3.9 G/DL — SIGNIFICANT CHANGE UP (ref 3.5–5)
ALP SERPL-CCNC: 75 U/L — SIGNIFICANT CHANGE UP (ref 40–120)
ALT FLD-CCNC: 84 U/L DA — HIGH (ref 10–60)
ANION GAP SERPL CALC-SCNC: 4 MMOL/L — LOW (ref 5–17)
AST SERPL-CCNC: 48 U/L — HIGH (ref 10–40)
BASE EXCESS BLDV CALC-SCNC: 4.5 MMOL/L — SIGNIFICANT CHANGE UP
BASOPHILS # BLD AUTO: 0.01 K/UL — SIGNIFICANT CHANGE UP (ref 0–0.2)
BASOPHILS NFR BLD AUTO: 0.2 % — SIGNIFICANT CHANGE UP (ref 0–2)
BILIRUB SERPL-MCNC: 0.3 MG/DL — SIGNIFICANT CHANGE UP (ref 0.2–1.2)
BUN SERPL-MCNC: 9 MG/DL — SIGNIFICANT CHANGE UP (ref 7–18)
CA-I SERPL-SCNC: SIGNIFICANT CHANGE UP MMOL/L (ref 1.15–1.33)
CALCIUM SERPL-MCNC: 9.6 MG/DL — SIGNIFICANT CHANGE UP (ref 8.4–10.5)
CHLORIDE SERPL-SCNC: 109 MMOL/L — HIGH (ref 96–108)
CO2 SERPL-SCNC: 23 MMOL/L — SIGNIFICANT CHANGE UP (ref 22–31)
CREAT SERPL-MCNC: 0.95 MG/DL — SIGNIFICANT CHANGE UP (ref 0.5–1.3)
EGFR: 79 ML/MIN/1.73M2 — SIGNIFICANT CHANGE UP
EOSINOPHIL # BLD AUTO: 0.05 K/UL — SIGNIFICANT CHANGE UP (ref 0–0.5)
EOSINOPHIL NFR BLD AUTO: 1 % — SIGNIFICANT CHANGE UP (ref 0–6)
GAS PNL BLDV: 134 MMOL/L — LOW (ref 136–145)
GAS PNL BLDV: SIGNIFICANT CHANGE UP
GLUCOSE SERPL-MCNC: 77 MG/DL — SIGNIFICANT CHANGE UP (ref 70–99)
HCG SERPL-ACNC: <1 MIU/ML — SIGNIFICANT CHANGE UP
HCO3 BLDV-SCNC: 31 MMOL/L — HIGH (ref 22–29)
HCT VFR BLD CALC: 42.8 % — SIGNIFICANT CHANGE UP (ref 34.5–45)
HGB BLD-MCNC: 13.9 G/DL — SIGNIFICANT CHANGE UP (ref 11.5–15.5)
HIV 1 & 2 AB SERPL IA.RAPID: SIGNIFICANT CHANGE UP
IMM GRANULOCYTES NFR BLD AUTO: 0.6 % — SIGNIFICANT CHANGE UP (ref 0–0.9)
LACTATE BLDV-MCNC: 2.4 MMOL/L — HIGH (ref 0.5–2)
LACTATE SERPL-SCNC: 2.5 MMOL/L — HIGH (ref 0.7–2)
LIDOCAIN IGE QN: 45 U/L — SIGNIFICANT CHANGE UP (ref 13–75)
LYMPHOCYTES # BLD AUTO: 2.34 K/UL — SIGNIFICANT CHANGE UP (ref 1–3.3)
LYMPHOCYTES # BLD AUTO: 44.5 % — HIGH (ref 13–44)
MCHC RBC-ENTMCNC: 31.7 PG — SIGNIFICANT CHANGE UP (ref 27–34)
MCHC RBC-ENTMCNC: 32.5 GM/DL — SIGNIFICANT CHANGE UP (ref 32–36)
MCV RBC AUTO: 97.5 FL — SIGNIFICANT CHANGE UP (ref 80–100)
MONOCYTES # BLD AUTO: 0.62 K/UL — SIGNIFICANT CHANGE UP (ref 0–0.9)
MONOCYTES NFR BLD AUTO: 11.8 % — SIGNIFICANT CHANGE UP (ref 2–14)
NEUTROPHILS # BLD AUTO: 2.21 K/UL — SIGNIFICANT CHANGE UP (ref 1.8–7.4)
NEUTROPHILS NFR BLD AUTO: 41.9 % — LOW (ref 43–77)
NRBC # BLD: 0 /100 WBCS — SIGNIFICANT CHANGE UP (ref 0–0)
PCO2 BLDV: 52 MMHG — HIGH (ref 39–42)
PH BLDV: 7.38 — SIGNIFICANT CHANGE UP (ref 7.32–7.43)
PLATELET # BLD AUTO: 129 K/UL — LOW (ref 150–400)
PO2 BLDV: 23 MMHG — SIGNIFICANT CHANGE UP
POTASSIUM BLDV-SCNC: 4 MMOL/L — SIGNIFICANT CHANGE UP (ref 3.5–5.1)
POTASSIUM SERPL-MCNC: 6.1 MMOL/L — HIGH (ref 3.5–5.3)
POTASSIUM SERPL-SCNC: 6.1 MMOL/L — HIGH (ref 3.5–5.3)
PROT SERPL-MCNC: 8 G/DL — SIGNIFICANT CHANGE UP (ref 6–8.3)
RBC # BLD: 4.39 M/UL — SIGNIFICANT CHANGE UP (ref 3.8–5.2)
RBC # FLD: 14.6 % — HIGH (ref 10.3–14.5)
SAO2 % BLDV: 27 % — SIGNIFICANT CHANGE UP
SODIUM SERPL-SCNC: 136 MMOL/L — SIGNIFICANT CHANGE UP (ref 135–145)
WBC # BLD: 5.26 K/UL — SIGNIFICANT CHANGE UP (ref 3.8–10.5)
WBC # FLD AUTO: 5.26 K/UL — SIGNIFICANT CHANGE UP (ref 3.8–10.5)

## 2024-10-14 PROCEDURE — 99284 EMERGENCY DEPT VISIT MOD MDM: CPT

## 2024-10-14 RX ORDER — FAMOTIDINE 40 MG
20 TABLET ORAL ONCE
Refills: 0 | Status: COMPLETED | OUTPATIENT
Start: 2024-10-14 | End: 2024-10-14

## 2024-10-14 RX ORDER — ACETAMINOPHEN 325 MG
1000 TABLET ORAL ONCE
Refills: 0 | Status: COMPLETED | OUTPATIENT
Start: 2024-10-14 | End: 2024-10-14

## 2024-10-14 RX ORDER — DIPHENHYDRAMINE HCL 12.5MG/5ML
25 LIQUID (ML) ORAL ONCE
Refills: 0 | Status: COMPLETED | OUTPATIENT
Start: 2024-10-14 | End: 2024-10-14

## 2024-10-14 RX ORDER — CYCLOBENZAPRINE HCL 10 MG
10 TABLET ORAL ONCE
Refills: 0 | Status: COMPLETED | OUTPATIENT
Start: 2024-10-14 | End: 2024-10-14

## 2024-10-14 RX ORDER — CYCLOBENZAPRINE HCL 10 MG
20 TABLET ORAL ONCE
Refills: 0 | Status: COMPLETED | OUTPATIENT
Start: 2024-10-14 | End: 2024-10-14

## 2024-10-14 RX ORDER — SODIUM CHLORIDE 0.9 % (FLUSH) 0.9 %
1000 SYRINGE (ML) INJECTION ONCE
Refills: 0 | Status: COMPLETED | OUTPATIENT
Start: 2024-10-14 | End: 2024-10-14

## 2024-10-14 RX ORDER — MAG HYDROX/ALUMINUM HYD/SIMETH 200-200-20
30 SUSPENSION, ORAL (FINAL DOSE FORM) ORAL ONCE
Refills: 0 | Status: COMPLETED | OUTPATIENT
Start: 2024-10-14 | End: 2024-10-14

## 2024-10-14 RX ADMIN — Medication 1000 MILLIGRAM(S): at 20:42

## 2024-10-14 RX ADMIN — Medication 1000 MILLILITER(S): at 20:28

## 2024-10-14 RX ADMIN — Medication 25 MILLIGRAM(S): at 20:29

## 2024-10-14 RX ADMIN — Medication 20 MILLIGRAM(S): at 22:02

## 2024-10-14 RX ADMIN — Medication 10 MILLIGRAM(S): at 20:27

## 2024-10-14 RX ADMIN — Medication 400 MILLIGRAM(S): at 20:27

## 2024-10-14 RX ADMIN — Medication 1000 MILLILITER(S): at 21:28

## 2024-10-14 RX ADMIN — Medication 25 MILLIGRAM(S): at 22:02

## 2024-10-14 RX ADMIN — Medication 30 MILLILITER(S): at 20:28

## 2024-10-14 RX ADMIN — Medication 20 MILLIGRAM(S): at 20:25

## 2024-10-14 NOTE — ED PROVIDER NOTE - PHYSICAL EXAMINATION
Gen:  Awake, alert, NAD, WDWN, NCAT, non-toxic appearing.  Eyes:  PERRL, EOMI, no icterus, normal lids/lashes, normal conjunctivae.  ENT:  External inspection normal, pink/moist membranes.   CV:  S1S2, regular rate and rhythm, no murmur/gallops/rubs, no JVD, 2+ pulses b/l, no edema/cords/homans, good capillary refill, warm/well-perfused.  Resp:  Normal respiratory rate/effort, no respiratory distress, normal voice, speaking full sentences, lungs clear to auscultation b/l, no wheezing/rales/rhonchi, no retractions, no stridor.  Abd:  Soft abdomen, no tender/distended/guarding/rebound, no pulsatile mass, no CVA tender.   Musculoskeletal:  N/V intact, FROM all 4 extremities, normal motor tone  Neck:  FROM neck, supple, trachea midline, no meningismus.   Skin:  Color normal for race, warm and dry, no rash.  Neuro:  Oriented x3, CN 2-12 intact (grossly), baseline motor/sensory unchanged according to patient. GCS 15  Psych:  Attention normal. Affect normal. Behavior normal. Judgment normal.

## 2024-10-14 NOTE — ED PROVIDER NOTE - PROGRESS NOTE DETAILS
Results reviewed. Initial labs hemolyzed. VBG w normal K 4.0. Lactate 2.4 but low suspicion of sepsis at this time. pH/pCO2: 7.38/52  Patient reports feeling better.  Tolerating PO intake.  No abd tender on serial exams.   Patient advised regarding symptomatic/supportive care, importance of outpatient follow up, and symptoms to prompt ED return. Results reviewed. Initial labs hemolyzed. VBG w normal K 4.0. Lactate 2.4 but low suspicion of sepsis at this time. pH/pCO2: 7.38/52  Patient reports feeling better.  Tolerating PO intake.  No abd tender on serial exams.   Patient advised regarding symptomatic/supportive care, importance of outpatient follow up, and symptoms to prompt ED return.  Attempted to contact NH to give report multiple times and no answer.

## 2024-10-14 NOTE — ED PROVIDER NOTE - OBJECTIVE STATEMENT
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   Patient presenting to the ED reporting abdominal pain for the past few days.  Patient reports being seen in [facility] on Saturday and had CT showing colitis for which she was given ABX in ED but none sent to her facility.

## 2024-10-14 NOTE — ED ADULT NURSE NOTE - NSFALLRISKINTERV_ED_ALL_ED

## 2024-10-14 NOTE — ED PROVIDER NOTE - PATIENT PORTAL LINK FT
You can access the FollowMyHealth Patient Portal offered by [facility] by registering at the following website: [email or web address]. By joining [facility] [facility]’s FollowMyHealth portal, you will also be able to view your health information using other applications (apps) compatible with our system.

## 2024-10-14 NOTE — ED PROVIDER NOTE - CLINICAL SUMMARY MEDICAL DECISION MAKING FREE TEXT BOX
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   Patient presenting to the ED reporting abdominal pain for the past few days.  Patient reports being seen in [facility] on Saturday and had CT showing colitis for which she was given ABX in ED but none sent to her facility.    Vitals stable.  Nontoxic appearing, n/v intact.  Airway intact, no respiratory distress, no hypoxia.  No abdominal or CVA tenderness.      ED note 10/7/2024 reviewed; patient seen for chest pain  ED note 9/26/2024 reviewed; patient seen for leg pain  Discharge note 9/19/2024 reviewed; patient evaluated for bilateral lower extremity numbness/weakness.  CT brain & CTA neck negative brain & MRI MRI brain negative.  MRI spine with degenerative disc disease & herniation, no evidence of demyelinating disease.      Plan to obtain:  -Labs, IV fluids, analgesia/antiemetics needed, observe/reassess    ED Course:  Lab values demonstrate no acute/emergent pathology.  My independent interpretation of the EKG: Sinus @ [], normal axis, normal intervals, normal ST/T  My independent interpretation of XR: [No consolidation/effusion/pntx]    [***]    [Patient advised regarding need for close outpatient follow up.  Patient stable for further care in outpatient setting. No indication for inpatient admission at this time. Patient advised regarding symptomatic & supportive care and symptoms to prompt ED return. Strict return precautions provided.]    [Patient requires inpatient admission for further care & stabilization. 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   Patient presenting to the ED reporting abdominal pain for the past few days.  Patient reports being seen in [facility] on Saturday and had CT showing colitis for which she was given ABX in ED but none sent to her facility.    Vitals stable.  Nontoxic appearing, n/v intact.  Airway intact, no respiratory distress, no hypoxia.  No abdominal or CVA tenderness.      ED note 10/7/2024 reviewed; patient seen for chest pain  ED note 9/26/2024 reviewed; patient seen for leg pain  Discharge note 9/19/2024 reviewed; patient evaluated for bilateral lower extremity numbness/weakness.  CT brain & CTA neck negative brain & MRI MRI brain negative.  MRI spine with degenerative disc disease & herniation, no evidence of demyelinating disease.      Plan to obtain:  -Labs, IV fluids, analgesia/antiemetics needed, observe/reassess    ED Course:  Lab values initially hemolyzed. Repeat blood gas w normal K.  Serial abdominal exams w no tenderness.  Low suspicion of sepsis or abscess/perforation at this time.  Analgesia given w improved symptoms.  Patient advised regarding need for close outpatient follow up.  Patient stable for further care in outpatient setting. No indication for inpatient admission at this time. Patient advised regarding symptomatic & supportive care and symptoms to prompt ED return. Strict return precautions provided.

## 2024-10-14 NOTE — ED PROVIDER NOTE - NSFOLLOWUPCLINICS_GEN_ALL_ED_FT
[facility]  Gastroenterology  [address] [place] [address]  Phone: [phone]  Fax: [phone]  Follow Up Time: 4-6 Days    [facility]  Internal Medicine  [address] [place] [address]  Phone: [phone]  Fax: [phone]  Follow Up Time: 1-3 Days

## 2024-10-14 NOTE — ED PROVIDER NOTE - NS ED ROS FT
Constitutional: (-) fever (-) chills  HENT: (-) congestion (-) rhinorrhea (-) sore throat  Eyes: (-) pain (-) redness  Respiratory: (-) cough (-) shortness of breath (-) wheezing (-) stridor  Cardiovascular: (-) chest pain (-) palpitations (-) leg swelling  Gastrointestinal: (+) abdominal pain (-) blood in stool (no melena/hematochezia) (+) diarrhea [resolved yesterday] (-) vomiting  Genitourinary: (-) dysuria (-) hematuria  Musculoskeletal: (-) gait problem (-) joint swelling (-) myalgias  Skin: (-) color change (-) rash  Neurological: (-) weakness (-) numbness (-) headaches  Psychiatric/Behavioral: (-) confusion

## 2024-10-15 ENCOUNTER — EMERGENCY (EMERGENCY)
Facility: HOSPITAL | Age: 39
LOS: 1 days | Discharge: HOME | End: 2024-10-15
Attending: STUDENT IN AN ORGANIZED HEALTH CARE EDUCATION/TRAINING PROGRAM | Admitting: STUDENT IN AN ORGANIZED HEALTH CARE EDUCATION/TRAINING PROGRAM
Payer: MEDICARE

## 2024-10-15 ENCOUNTER — APPOINTMENT (OUTPATIENT)
Dept: ENDOCRINOLOGY | Facility: CLINIC | Age: 39
End: 2024-10-15

## 2024-10-15 ENCOUNTER — TRANSCRIPTION ENCOUNTER (OUTPATIENT)
Age: 39
End: 2024-10-15

## 2024-10-15 VITALS
DIASTOLIC BLOOD PRESSURE: 68 MMHG | HEART RATE: 112 BPM | SYSTOLIC BLOOD PRESSURE: 94 MMHG | RESPIRATION RATE: 18 BRPM | OXYGEN SATURATION: 97 % | TEMPERATURE: 98 F | HEIGHT: 71 IN | WEIGHT: 293 LBS

## 2024-10-15 VITALS
RESPIRATION RATE: 19 BRPM | OXYGEN SATURATION: 100 % | HEART RATE: 101 BPM | TEMPERATURE: 97 F | DIASTOLIC BLOOD PRESSURE: 69 MMHG | SYSTOLIC BLOOD PRESSURE: 119 MMHG

## 2024-10-15 VITALS
TEMPERATURE: 98 F | RESPIRATION RATE: 16 BRPM | SYSTOLIC BLOOD PRESSURE: 117 MMHG | HEART RATE: 99 BPM | DIASTOLIC BLOOD PRESSURE: 84 MMHG | OXYGEN SATURATION: 100 %

## 2024-10-15 LAB
HCV AB S/CO SERPL IA: 0.2 S/CO — SIGNIFICANT CHANGE UP (ref 0–0.99)
HCV AB SERPL-IMP: SIGNIFICANT CHANGE UP

## 2024-10-15 PROCEDURE — 99284 EMERGENCY DEPT VISIT MOD MDM: CPT

## 2024-10-15 PROCEDURE — 93010 ELECTROCARDIOGRAM REPORT: CPT

## 2024-10-15 RX ADMIN — Medication 500 MILLIGRAM(S): at 00:45

## 2024-10-15 NOTE — ED ADULT TRIAGE NOTE - CHIEF COMPLAINT QUOTE
pt arrives from nursing home c/o dizziness x few days. Denies chest pain, sob, n/v. Hx Intellectual disability, T2DM, Hypothyroidism, DVT on eliquis, Bipolar disorder, Seizure, asthma. Well appearing

## 2024-10-15 NOTE — ED ADULT TRIAGE NOTE - BEFAST ARM SIDE DRIFT
Physical Therapy  Facility/Department: [facility] MED SUR[facility] C178/U254-77  Physical Therapy Discharge      NAME: Phil Morrison    : 1935 (82 y.o.)  MRN: [id number]    Account: [de-identified]  Gender: male      Patient has been discharged from acute care hospital. DC patient from current PT program.      Electronically signed by Ambrose Cage PT on 11/10/21 at 1:35 PM EST No

## 2024-10-16 LAB
ALBUMIN SERPL ELPH-MCNC: 4.4 G/DL — SIGNIFICANT CHANGE UP (ref 3.3–5)
ALP SERPL-CCNC: 71 U/L — SIGNIFICANT CHANGE UP (ref 40–120)
ALT FLD-CCNC: 53 U/L — HIGH (ref 4–33)
ANION GAP SERPL CALC-SCNC: 13 MMOL/L — SIGNIFICANT CHANGE UP (ref 7–14)
APPEARANCE UR: CLEAR — SIGNIFICANT CHANGE UP
AST SERPL-CCNC: 25 U/L — SIGNIFICANT CHANGE UP (ref 4–32)
BACTERIA # UR AUTO: NEGATIVE /HPF — SIGNIFICANT CHANGE UP
BASOPHILS # BLD AUTO: 0.02 K/UL — SIGNIFICANT CHANGE UP (ref 0–0.2)
BASOPHILS NFR BLD AUTO: 0.3 % — SIGNIFICANT CHANGE UP (ref 0–2)
BILIRUB SERPL-MCNC: 0.2 MG/DL — SIGNIFICANT CHANGE UP (ref 0.2–1.2)
BILIRUB UR-MCNC: NEGATIVE — SIGNIFICANT CHANGE UP
BUN SERPL-MCNC: 12 MG/DL — SIGNIFICANT CHANGE UP (ref 7–23)
CALCIUM SERPL-MCNC: 9.5 MG/DL — SIGNIFICANT CHANGE UP (ref 8.4–10.5)
CAST: 0 /LPF — SIGNIFICANT CHANGE UP (ref 0–4)
CHLORIDE SERPL-SCNC: 101 MMOL/L — SIGNIFICANT CHANGE UP (ref 98–107)
CO2 SERPL-SCNC: 23 MMOL/L — SIGNIFICANT CHANGE UP (ref 22–31)
COLOR SPEC: YELLOW — SIGNIFICANT CHANGE UP
CREAT SERPL-MCNC: 0.79 MG/DL — SIGNIFICANT CHANGE UP (ref 0.5–1.3)
DIFF PNL FLD: NEGATIVE — SIGNIFICANT CHANGE UP
EGFR: 99 ML/MIN/1.73M2 — SIGNIFICANT CHANGE UP
EOSINOPHIL # BLD AUTO: 0.04 K/UL — SIGNIFICANT CHANGE UP (ref 0–0.5)
EOSINOPHIL NFR BLD AUTO: 0.7 % — SIGNIFICANT CHANGE UP (ref 0–6)
GAS PNL BLDV: SIGNIFICANT CHANGE UP
GLUCOSE SERPL-MCNC: 73 MG/DL — SIGNIFICANT CHANGE UP (ref 70–99)
GLUCOSE UR QL: NEGATIVE MG/DL — SIGNIFICANT CHANGE UP
HCT VFR BLD CALC: 36.2 % — SIGNIFICANT CHANGE UP (ref 34.5–45)
HGB BLD-MCNC: 11.8 G/DL — SIGNIFICANT CHANGE UP (ref 11.5–15.5)
IANC: 2.84 K/UL — SIGNIFICANT CHANGE UP (ref 1.8–7.4)
IMM GRANULOCYTES NFR BLD AUTO: 0.3 % — SIGNIFICANT CHANGE UP (ref 0–0.9)
KETONES UR-MCNC: NEGATIVE MG/DL — SIGNIFICANT CHANGE UP
LEUKOCYTE ESTERASE UR-ACNC: ABNORMAL
LIDOCAIN IGE QN: 33 U/L — SIGNIFICANT CHANGE UP (ref 7–60)
LYMPHOCYTES # BLD AUTO: 2.49 K/UL — SIGNIFICANT CHANGE UP (ref 1–3.3)
LYMPHOCYTES # BLD AUTO: 40.8 % — SIGNIFICANT CHANGE UP (ref 13–44)
MCHC RBC-ENTMCNC: 31.5 PG — SIGNIFICANT CHANGE UP (ref 27–34)
MCHC RBC-ENTMCNC: 32.6 GM/DL — SIGNIFICANT CHANGE UP (ref 32–36)
MCV RBC AUTO: 96.5 FL — SIGNIFICANT CHANGE UP (ref 80–100)
MONOCYTES # BLD AUTO: 0.7 K/UL — SIGNIFICANT CHANGE UP (ref 0–0.9)
MONOCYTES NFR BLD AUTO: 11.5 % — SIGNIFICANT CHANGE UP (ref 2–14)
NEUTROPHILS # BLD AUTO: 2.84 K/UL — SIGNIFICANT CHANGE UP (ref 1.8–7.4)
NEUTROPHILS NFR BLD AUTO: 46.4 % — SIGNIFICANT CHANGE UP (ref 43–77)
NITRITE UR-MCNC: NEGATIVE — SIGNIFICANT CHANGE UP
NRBC # BLD: 0 /100 WBCS — SIGNIFICANT CHANGE UP (ref 0–0)
NRBC # FLD: 0 K/UL — SIGNIFICANT CHANGE UP (ref 0–0)
PH UR: 8 — SIGNIFICANT CHANGE UP (ref 5–8)
PLATELET # BLD AUTO: 150 K/UL — SIGNIFICANT CHANGE UP (ref 150–400)
POTASSIUM SERPL-MCNC: 4.9 MMOL/L — SIGNIFICANT CHANGE UP (ref 3.5–5.3)
POTASSIUM SERPL-SCNC: 4.9 MMOL/L — SIGNIFICANT CHANGE UP (ref 3.5–5.3)
PROT SERPL-MCNC: 7.2 G/DL — SIGNIFICANT CHANGE UP (ref 6–8.3)
PROT UR-MCNC: NEGATIVE MG/DL — SIGNIFICANT CHANGE UP
RBC # BLD: 3.75 M/UL — LOW (ref 3.8–5.2)
RBC # FLD: 14.5 % — SIGNIFICANT CHANGE UP (ref 10.3–14.5)
RBC CASTS # UR COMP ASSIST: 1 /HPF — SIGNIFICANT CHANGE UP (ref 0–4)
SODIUM SERPL-SCNC: 137 MMOL/L — SIGNIFICANT CHANGE UP (ref 135–145)
SP GR SPEC: 1.01 — SIGNIFICANT CHANGE UP (ref 1–1.03)
SQUAMOUS # UR AUTO: 4 /HPF — SIGNIFICANT CHANGE UP (ref 0–5)
T3 SERPL-MCNC: 88 NG/DL — SIGNIFICANT CHANGE UP (ref 80–200)
T4 AB SER-ACNC: 6.58 UG/DL — SIGNIFICANT CHANGE UP (ref 5.1–13)
T4 FREE SERPL-MCNC: 1.1 NG/DL — SIGNIFICANT CHANGE UP (ref 0.9–1.8)
TSH SERPL-MCNC: 2.78 UIU/ML — SIGNIFICANT CHANGE UP (ref 0.27–4.2)
UROBILINOGEN FLD QL: 0.2 MG/DL — SIGNIFICANT CHANGE UP (ref 0.2–1)
WBC # BLD: 6.11 K/UL — SIGNIFICANT CHANGE UP (ref 3.8–10.5)
WBC # FLD AUTO: 6.11 K/UL — SIGNIFICANT CHANGE UP (ref 3.8–10.5)
WBC UR QL: 1 /HPF — SIGNIFICANT CHANGE UP (ref 0–5)

## 2024-10-16 RX ORDER — ACETAMINOPHEN 325 MG
1000 TABLET ORAL ONCE
Refills: 0 | Status: DISCONTINUED | OUTPATIENT
Start: 2024-10-16 | End: 2024-10-16

## 2024-10-16 RX ORDER — DIPHENHYDRAMINE HCL 12.5MG/5ML
50 LIQUID (ML) ORAL ONCE
Refills: 0 | Status: COMPLETED | OUTPATIENT
Start: 2024-10-16 | End: 2024-10-16

## 2024-10-16 RX ORDER — ACETAMINOPHEN 325 MG
975 TABLET ORAL ONCE
Refills: 0 | Status: COMPLETED | OUTPATIENT
Start: 2024-10-16 | End: 2024-10-16

## 2024-10-16 RX ORDER — FAMOTIDINE 40 MG
20 TABLET ORAL ONCE
Refills: 0 | Status: DISCONTINUED | OUTPATIENT
Start: 2024-10-15 | End: 2024-10-16

## 2024-10-16 RX ORDER — SODIUM CHLORIDE 0.9 % (FLUSH) 0.9 %
1000 SYRINGE (ML) INJECTION ONCE
Refills: 0 | Status: DISCONTINUED | OUTPATIENT
Start: 2024-10-15 | End: 2024-10-16

## 2024-10-16 RX ORDER — CYCLOBENZAPRINE HCL 10 MG
10 TABLET ORAL ONCE
Refills: 0 | Status: COMPLETED | OUTPATIENT
Start: 2024-10-16 | End: 2024-10-16

## 2024-10-16 RX ORDER — METOCLOPRAMIDE HCL 5 MG
10 TABLET ORAL ONCE
Refills: 0 | Status: COMPLETED | OUTPATIENT
Start: 2024-10-16 | End: 2024-10-16

## 2024-10-16 RX ORDER — FAMOTIDINE 40 MG
20 TABLET ORAL ONCE
Refills: 0 | Status: COMPLETED | OUTPATIENT
Start: 2024-10-16 | End: 2024-10-16

## 2024-10-16 RX ORDER — MAG HYDROX/ALUMINUM HYD/SIMETH 200-200-20
30 SUSPENSION, ORAL (FINAL DOSE FORM) ORAL ONCE
Refills: 0 | Status: COMPLETED | OUTPATIENT
Start: 2024-10-15 | End: 2024-10-15

## 2024-10-16 RX ADMIN — Medication 10 MILLIGRAM(S): at 03:28

## 2024-10-16 RX ADMIN — Medication 30 MILLILITER(S): at 01:19

## 2024-10-16 RX ADMIN — Medication 10 MILLIGRAM(S): at 04:29

## 2024-10-16 RX ADMIN — Medication 20 MILLIGRAM(S): at 01:19

## 2024-10-16 RX ADMIN — Medication 10 MILLIGRAM(S): at 01:19

## 2024-10-16 RX ADMIN — Medication 50 MILLIGRAM(S): at 03:28

## 2024-10-16 RX ADMIN — Medication 975 MILLIGRAM(S): at 01:19

## 2024-10-16 NOTE — ED PROVIDER NOTE - PROGRESS NOTE DETAILS
No actionable findings on labs. Will DC w return precautions and follow up info w teach back. Marlys Khalil, ED Attending

## 2024-10-16 NOTE — ED PROVIDER NOTE - ATTENDING CONTRIBUTION TO CARE
I have personally performed a face to face medical and diagnostic evaluation of the patient. I have discussed with and reviewed the Resident's note and agree with the History, ROS, Physical Exam and MDM unless otherwise indicated. A brief summary of my personal evaluation and impression can be found below.      39-year-old female, past medical history as detailed above presenting for abdominal discomfort, nausea and vomiting.  Per chart review here in the hospital for similar complaints in the past.  On exam, vital signs stable, no reproducible abdominal tenderness to palpation.  Per complex care note, frequent visits for similar complaints due to issues at nursing facility.  Plan for labs including abdominal enzymes, no indication for abdominal imaging given current exam at this time.  Will trial symptomatic relief symptoms could be secondary to gastritis.  Reassess to dispo. Marlys Khalil, ED Attending

## 2024-10-16 NOTE — PROVIDER CONTACT NOTE (OTHER) - ASSESSMENT
Pt is returning to [facility]; needs ambulance transportation due to fall risk, seizure precautions, bi-polar disorder, unsteady gait.

## 2024-10-16 NOTE — ED ADULT NURSE NOTE - OBJECTIVE STATEMENT
Break RN note- Patient arrives to the ED for dizziness, abdominal pain, nausea x3 days. Patient reports currently in a NH for numbness to her feet. Patient breathing even and nonlabored. Belly soft, nondistended. Denies any chest pain, SOB, vision changes, fevers. Unable to obtain IV access. Dr. Khalil made aware.  Labs obtained. Patient to be medicated as ordered. Urine sample sent. Awaiting results. Safety maintained. Patient stable upon exiting the room.

## 2024-10-16 NOTE — PROVIDER CONTACT NOTE (OTHER) - ACTION/TREATMENT ORDERED:
Pt reports the ability to verbalize her needs to staff as needed and will utilize her supports such as facility SW, community care manager from group home, and SNF based therapist.
Arranged [id number]; ETA 5:00 a.m.

## 2024-10-16 NOTE — PROVIDER CONTACT NOTE (OTHER) - SITUATION
Notified by provider that pt is being discharged; needs transportation.
Notified by provider that pt requested to speak with SW

## 2024-10-16 NOTE — ED PROVIDER NOTE - PATIENT'S GENDER IDENTITY
Start Paxlovid  Isolate x 5 days from symptom onset then wear well fitting mask for an additional 5 days thereafter  Deep breathing exercises, ambulation  OTC Tylenol as directed  Increase fluids with electrolytes if decreased intake Female

## 2024-10-16 NOTE — ED PROVIDER NOTE - NSFOLLOWUPINSTRUCTIONS_ED_ALL_ED_FT
See attached information on nausea.      Return to the emergency room for any new or worsening symptoms.      Continue all your home medications as prescribed.    Nausea, Adult    Nausea is the feeling of having an upset stomach or that you are about to vomit. Nausea on its own is not usually a serious concern, but it may be an early sign of a more serious medical problem. As nausea gets worse, it can lead to vomiting. If vomiting develops, or if you are not able to drink enough fluids, you are at risk of becoming dehydrated.    Dehydration can make you tired and thirsty, cause you to have a dry mouth, and decrease how often you urinate. Older adults and people with other diseases or a weak disease-fighting system (immune system) are at higher risk for dehydration. The main goals of treating your nausea are:  To relieve your nausea.  To limit repeated nausea episodes.  To prevent vomiting and dehydration.  Follow these instructions at home:  Watch your symptoms for any changes. Tell your health care provider about them.    Eating and drinking    A bottle of clear fruit juice and glass of water.   A sign showing that a person should not drink alcohol.  Take an oral rehydration solution (ORS). This is a drink that is sold at pharmacies and retail stores.  Drink clear fluids slowly and in small amounts as you are able. Clear fluids include water, ice chips, low-calorie sports drinks, and fruit juice that has water added (diluted fruit juice).  Eat bland, easy-to-digest foods in small amounts as you are able. These foods include bananas, applesauce, rice, lean meats, toast, and crackers.  Avoid drinking fluids that contain a lot of sugar or caffeine, such as energy drinks, sports drinks, and soda.  Avoid alcohol.  Avoid spicy or fatty foods.  General instructions    Take over-the-counter and prescription medicines only as told by your health care provider.  Rest at home while you recover.  Drink enough fluid to keep your urine pale yellow.  Breathe slowly and deeply when you feel nauseous.  Avoid smelling things that have strong odors.  Wash your hands often using soap and water for at least 20 seconds. If soap and water are not available, use hand .  Make sure that everyone in your household washes their hands well and often.  Keep all follow-up visits. This is important.  Contact a health care provider if:  Your nausea gets worse.  Your nausea does not go away after two days.  You vomit multiple times.  You cannot drink fluids without vomiting.  You have any of the following:  New symptoms.  A fever.  A headache.  Muscle cramps.  A rash.  Pain while urinating.  You feel light-headed or dizzy.  Get help right away if:  You have pain in your chest, neck, arm, or jaw.  You feel extremely weak or you faint.  You have vomit that is bright red or looks like coffee grounds.  You have bloody or black stools (feces) or stools that look like tar.  You have a severe headache, a stiff neck, or both.  You have severe pain, cramping, or bloating in your abdomen.  You have difficulty breathing or are breathing very quickly.  Your heart is beating very quickly.  Your skin feels cold and clammy.  You feel confused.  You have signs of dehydration, such as:  Dark urine, very little urine, or no urine.  Cracked lips.  Dry mouth.  Sunken eyes.  Sleepiness.  Weakness.  These symptoms may be an emergency. Get help right away. Call 911.  Do not wait to see if the symptoms will go away.  Do not drive yourself to the hospital.  Summary  Nausea is the feeling that you have an upset stomach or that you are about to vomit. Nausea on its own is not usually a serious concern, but it may be an early sign of a more serious medical problem.  If vomiting develops, or if you are not able to drink enough fluids, you are at risk of becoming dehydrated.  Follow recommendations for eating and drinking and take over-the-counter and prescription medicines only as told by your health care provider.  Contact a health care provider right away if your symptoms worsen or you have new symptoms.  Keep all follow-up visits. This is important.  This information is not intended to replace advice given to you by your health care provider. Make sure you discuss any questions you have with your health care provider.

## 2024-10-16 NOTE — ED PROVIDER NOTE - CLINICAL SUMMARY MEDICAL DECISION MAKING FREE TEXT BOX
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  Patient reports being seen in [facility] on Saturday and had CT showing colitis for which she was given ABX in ED but none sent to her facility. She was seen at [facility] yesterday and had initial K 6 but lower on repeat blood gas and DCd after serial abdominal exams non concerning.  VS non actionable, mildly tachycardic 101  Nontoxic appearing,  Airway intact, no respiratory distress, no hypoxia.  No abdominal or CVA tenderness.    DDx/plan- will repeat labs to eval for lyte abnomality or new leukocytosis. Low suspicion for acute intraabdominal surgical pathology.

## 2024-10-16 NOTE — ED ADULT NURSE NOTE - NSICDXPASTMEDICALHX_GEN_ALL_CORE_FT
Patient : Efrain Persaud Age: 58 year old Sex: male   MRN: [id number] Encounter Date: 7/21/2019      History     Chief Complaint   Patient presents with   • Chest Pain Adult     HPI   6A/B06A  7/21/2019    2:39 AM Efrain Persaud is a 58 year old male with a hx of type II DM, HLD and CAD who presents to the ED via EMS for evaluation of N/V/D that began yesterday around 3:00 AM. He had these sx all day yesterday and reports 3 episodes of vomiting and has been having a BM every hour. Around 9:00 PM he started to develop CP and dizziness, he was feeling \"clammy\" and confused. No previous similar episodes in the past and no new sick contacts. He was on abx in the last month. Additional sx include appetite decrease for the last few days.The pt denies abd pain, blood in the stool or any other associated sx. There are no further complaints or modifying factors at this time.    Chart Review: I reviewed the patient's medications, allergies, and past medical and surgical history in Epic. Hx of CAD, had a stent in 2017. He was admitted at the beginning of June for CP and had a normal stress test. Hx of HLD, HTN, obesity and DM. He had a cath in 2017 which showed proximal occlusion on the LAD which was chronic, stents were patent.     PCP: Cristobal Puga, DO     No Known Allergies    Current Discharge Medication List      Prior to Admission Medications    Details   SURE COMFORT PEN NEEDLES 31G X 5 MM Misc USE WITH LANTUS, HUMALOG, AND  Qty: 100 each, Refills: 1      metoPROLOL tartrate (LOPRESSOR) 100 MG tablet Take 1 tablet by mouth 2 times daily.  Qty: 60 tablet, Refills: 1      furosemide (LASIX) 20 MG tablet Take 1 tablet by mouth every morning.  Qty: 30 tablet, Refills: 1      clopidogrel (PLAVIX) 75 MG tablet Take 1 tablet by mouth daily.  Qty: 30 tablet, Refills: 1      LYRICA 50 MG capsule TAKE 1 CAPSULE BY MOUTH AT BEDTIME.  Qty: 30 capsule, Refills: 0      NOVOLOG FLEXPEN 100 UNIT/ML pen-injector 26-38 UNITS (SLIDING  SCALE)  UNITS 3 TIMES A DAY BEFORE MEALS  Qty: 15 mL, Refills: 5      losartan-hydrochlorothiazide (HYZAAR) 100-12.5 MG per tablet TAKE 1 TABLET DAILY  Qty: 30 tablet, Refills: 6      LANTUS SOLOSTAR 100 UNIT/ML pen-injector USE 45 UNITS INTO THE SKIN  TWO TIMES A DAY  Qty: 15 mL, Refills: 5      aspirin 81 MG tablet Take 1 tablet by mouth daily.  Qty: 30 tablet, Refills: 6      atorvastatin (LIPITOR) 40 MG tablet Take 1 tablet by mouth daily.  Qty: 90 tablet, Refills: 1      doxycycline hyclate (VIBRA-TABS) 100 MG tablet Take 1 tablet by mouth 2 times daily.  Qty: 14 tablet, Refills: 0      sertraline (ZOLOFT) 100 MG tablet TAKE 1 TABLET DAILY  Qty: 30 tablet, Refills: 5      gemfibrozil (LOPID) 600 MG tablet TAKE 1 TABLET AT BEDTIME  Qty: 30 tablet, Refills: 5      pantoprazole (PROTONIX) 40 MG tablet TAKE 1 TABLET TWICE A DAY  Qty: 180 tablet, Refills: 1      terazosin (HYTRIN) 5 MG capsule TAKE 1 CAPSULE NIGHTLY  Qty: 30 capsule, Refills: 5      glimepiride (AMARYL) 2 MG tablet TAKE 1 TABLET BY MOUTH DAILY (BEFORE BREAKFAST)  Qty: 30 tablet, Refills: 4      insulin lispro (HUMALOG KWIKPEN) 100 UNIT/ML pen-injector Inject into the skin 3 times daily (before meals). Per Sliding Scale: Tid with meals. IF BG is 100-150=30 units, 151-200=34 units, 201-250=38 units, 251-300=39 units, 301-350=42 units      isosorbide mononitrate (IMDUR) 120 MG 24 hr tablet TAKE 1 TABLET DAILY  Qty: 30 tablet, Refills: 11      blood glucose (FREESTYLE LITE) test strip TEST THREE TIMES A DAY  Qty: 100 each, Refills: 10      hydrALAZINE (APRESOLINE) 10 MG tablet Take 1 tablet by mouth 2 times daily.  Qty: 60 tablet, Refills: 6      linaGLIPtin (TRADJENTA) 5 MG tablet Take 1 tablet by mouth daily.  Qty: 30 tablet, Refills: 6      Insulin Pen Needle (PEN NEEDLES 5/16\") 31G X 8 MM Misc To use with Lantus  Qty: 100 each, Refills: 11      blood glucose meter Test blood sugar 3 times daily as directed. Diagnosis: E11.9. Meter: One Touch Ultra  Mini  Qty: 1 kit, Refills: 0      cholecalciferol (VITAMIN D3) 1000 UNITS tablet Take 2,000 Units by mouth daily. Dose: 2 tabs (= 2,000 units)             Past Medical History:   Diagnosis Date   • Anxiety    • CAD (coronary artery disease) 2011    History of angioplasty/stents   • Depression    • DMII (diabetes mellitus, type 2) (CMS/[facility])    • Gastroesophageal reflux disease    • Hyperlipidemia    • Hypertension    • Hypertriglyceridemia 2/15/2015   • Lumbar spondylosis    • Mild cognitive impairment 8/3/2017   • Morbid obesity with BMI of 40.0-44.9, adult (CMS/[facility]) 12/22/2014   • CARLOTTA (obstructive sleep apnea) 2018    uses CPAP   • Pneumonia    • Shingles 2017    right side of body   • Vitamin D deficiency    • VRE (vancomycin-resistant Enterococci) 2016    Urine   • Wears glasses     reading only       Past Surgical History:   Procedure Laterality Date   • APPENDECTOMY     • ARTHROSCOPY KNEE MEDIAL&LATERA Right 12/04/2018    Dr Khanna. [facility]   • CORONARY ANGIOPLASTY WITH STENT PLACEMENT  9/2011    stents placed to LAD?   • FOOT SURGERY Left 7/2015    bone spur removed   • INCISION AND DRAINAGE Left 3/27/2016    [place]. Dr Khanna. [facility]   • INCISION AND DRAINAGE Left 6/24/2016    [place]. Dr Khanna. [facility]   • PTCA  6/2014    cath only-chronic occluded LAD, other coronaries OK   • REMOVAL GALLBLADDER         Family History   Problem Relation Age of Onset   • Hypertension Mother    • Diabetes Mother    • Cancer Mother         leukemia   • Depression Mother    • Asthma Mother    • Arthritis Mother    • Heart disease Mother    • High cholesterol Mother    • Asthma Father    • Arthritis Father    • Hypertension Father    • High cholesterol Father    • Cancer Father         bone   • Diabetes Father    • Heart disease Father         CAD   • Depression Sister    • Diabetes Sister    • Hypertension Sister    • High cholesterol Sister    • Gastrointestinal Sister    • Cancer Sister 57        lymph node       Social History      Tobacco Use   • Smoking status: Never Smoker   • Smokeless tobacco: Never Used   Substance Use Topics   • Alcohol use: Yes     Alcohol/week: 1.2 oz     Types: 2 Glasses of wine per week     Comment: occassionally   • Drug use: No       Review of Systems   Constitutional: Positive for diaphoresis (  \"clammy\"). Negative for activity change and appetite change.   HENT: Negative for sore throat and trouble swallowing.    Eyes: Negative for discharge and visual disturbance.   Respiratory: Negative for chest tightness, shortness of breath and wheezing.    Cardiovascular: Positive for chest pain. Negative for leg swelling.   Gastrointestinal: Positive for diarrhea, nausea and vomiting. Negative for abdominal pain.   Genitourinary: Negative for dysuria.   Musculoskeletal: Negative for joint swelling.   Skin: Negative for rash.   Neurological: Positive for dizziness. Negative for facial asymmetry, weakness, light-headedness and headaches.   Hematological: Negative for adenopathy.   Psychiatric/Behavioral: Positive for confusion.       Physical Exam     ED Triage Vitals [07/21/19 0240]   ED Triage Vitals Group      Temp (!) 101.7 °F (38.7 °C)      Pulse 113      Resp 20      /82      SpO2 93 %      EtCO2 mmHg       Height 5' 8\" (1.727 m)      Weight 292 lb 1.8 oz (132.5 kg)      Weight Scale Used ED Actual       Physical Exam   Constitutional: He is oriented to person, place, and time. He appears well-developed and well-nourished. No distress.   Elevated BMI   HENT:   Head: Normocephalic and atraumatic.   Mouth/Throat: Mucous membranes are dry. No oropharyngeal exudate.   Eyes: Pupils are equal, round, and reactive to light. Conjunctivae and EOM are normal. No scleral icterus.   Neck: Normal range of motion.   Cardiovascular: Regular rhythm, normal heart sounds and intact distal pulses. Tachycardia present.   No murmur heard.  Pulmonary/Chest: Effort normal and breath sounds normal. No respiratory distress. He has  no wheezes.   Abdominal: Soft. Bowel sounds are normal. He exhibits no distension. There is no tenderness (no reproducible pain).   Musculoskeletal: Normal range of motion. He exhibits no edema or tenderness.   No BLE edema   Neurological: He is alert and oriented to person, place, and time. No cranial nerve deficit. He exhibits normal muscle tone. Coordination normal.   Skin: Skin is warm and dry. He is not diaphoretic. No erythema.   Nursing note and vitals reviewed.      ED Course     Procedures    Lab Results     Results for orders placed or performed during the hospital encounter of 07/21/19   CBC & Auto Differential   Result Value Ref Range    WBC 10.0 4.2 - 11.0 K/mcL    RBC 4.73 4.50 - 5.90 mil/mcL    HGB 13.8 13.0 - 17.0 g/dL    HCT 42.5 39.0 - 51.0 %    MCV 89.9 78.0 - 100.0 fl    MCH 29.2 26.0 - 34.0 pg    MCHC 32.5 32.0 - 36.5 g/dL    RDW-CV 13.2 11.0 - 15.0 %     140 - 450 K/mcL    NRBC 0 0 /100 WBC    DIFF TYPE AUTOMATED DIFFERENTIAL     Neutrophil 79 %    LYMPH 13 %    MONO 5 %    EOSIN 2 %    BASO 0 %    Percent Immature Granuloctyes 1 %    Absolute Neutrophil 8.0 (H) 1.8 - 7.7 K/mcL    Absolute Lymph 1.3 1.0 - 4.0 K/mcL    Absolute Mono 0.5 0.3 - 0.9 K/mcL    Absolute Eos 0.2 0.1 - 0.5 K/mcL    Absolute Baso 0.0 0.0 - 0.3 K/mcL    Absolute Immature Granulocytes 0.1 0 - 0.2 K/mcl   Prothrombin Time   Result Value Ref Range    PROTIME 10.5 9.7 - 11.8 sec    INR 1.0    Partial Thromboplastin Time   Result Value Ref Range    PTT 26 22 - 32 sec   Basic Metabolic Panel   Result Value Ref Range    Sodium 135 135 - 145 mmol/L    Potassium 3.6 3.4 - 5.1 mmol/L    Chloride 103 98 - 107 mmol/L    Carbon Dioxide 23 21 - 32 mmol/L    Anion Gap 13 10 - 20 mmol/L    Glucose 297 (H) 65 - 99 mg/dL    BUN 14 6 - 20 mg/dL    Creatinine 0.78 0.67 - 1.17 mg/dL    GFR Estimate,  >90     GFR Estimate, Non African American >90     BUN/Creatinine Ratio 18 7 - 25    CALCIUM 9.0 8.4 - 10.2 mg/dL    Lipase Level   Result Value Ref Range    Lipase 188 73 - 393 Units/L   Magnesium Level   Result Value Ref Range    MAGNESIUM 1.5 (L) 1.7 - 2.4 mg/dL   Lactic Acid Venous - Point of Care   Result Value Ref Range    Lactic Acid Venous 2.5 (HH) <2.0 mmol/L   Troponin I - Point of Care   Result Value Ref Range    Troponin I POC <0.10 <0.10 ng/mL   Hepatic Function Panel   Result Value Ref Range    Albumin 3.6 3.6 - 5.1 g/dL    TOTAL BILIRUBIN 0.5 0.2 - 1.0 mg/dL    DIRECT BILIRUBIN 0.2 0.0 - 0.2 mg/dL    ALK PHOSPHATASE 93 45 - 117 Units/L    ALT/SGPT 66 (H) <64 Units/L    AST/SGOT 45 (H) <38 Units/L    TOTAL PROTEIN 7.1 6.4 - 8.2 g/dL   Lactic Acid Venous - Point of Care   Result Value Ref Range    Lactic Acid Venous 1.8 <2.0 mmol/L   Troponin I - Point of Care   Result Value Ref Range    Troponin I POC <0.10 <0.10 ng/mL       EKG Results     EKG Interpretation  Rate: 113  Rhythm: sinus tachycardia   Abnormality: Nonspecific ST abnormality, When compared to previous ECG of 4 MAY 2019, aberrant conduction is no longer present, Nonspecific ST abnormality no longer evident in inferior leads     EKG interpreted by ED physician    EKG Interpretation:   Sinus tachycardia   Nonspecific T wave abnormality   Abnormal ECG   When compared with ECG of   21-JUL-2019 02:33, no significant change, non-specific T-waves in anterior leads     Radiology Results     Imaging Results          CT ABDOMEN PELVIS WO CONTRAST (Final result)  Result time 07/21/19 05:52:42    Final result                 Impression:    IMPRESSION:    1.  No acute intraabdominal findings.  2.  Hepatic steatosis.  3.  Stable small bilateral adrenal myelolipomas.    I have personally reviewed the images and modified the resident's report as  necessary.               Narrative:    CT ABDOMEN PELVIS WO CONTRAST    HISTORY: 58-year-old male with nausea, vomiting, diarrhea and decreased  appetite.     COMPARISON: Multiple prior CT studies, most recent  CT abdomen  pelvis of  January 3, 2019     TECHNIQUE:  Axial images through the abdomen and pelvis. Sagittal and coronal  reformats.    IV contrast: None.   Oral contrast:  None.    FINDINGS, ABDOMEN/PELVIS:  Examination of the visceral organs is limited by the lack of oral or IV  contrast.    LUNG BASES: Mild dependent lower lobe atelectatic change. Heavy coronary  atherosclerosis and small pericardial effusion.    LIVER: Hepatic steatosis.  GALLBLADDER: Cholecystectomy.  SPLEEN: Unremarkable.  PANCREAS: Unremarkable.  ADRENAL GLANDS: Unchanged small bilateral adrenal myelolipomas, measuring  up to 1.6 cm on the right and 1.1 cm on the left.    KIDNEYS: Unremarkable.    COLON: Unremarkable.  APPENDIX: Normal.  SMALL BOWEL: Unremarkable.  STOMACH: Unremarkable    MESENTERY AND LYMPH NODES: Unremarkable.  VASCULAR: Mild atherosclerotic disease, no abdominal aortic aneurysm.    BLADDER: Unremarkable.  PELVIS: No pelvic free fluid. Prostate is grossly unremarkable.    OSSEOUS: Moderate degenerative changes lumbar spine, similar to previous.  SOFT TISSUES: Unremarkable                      Preliminary result                 Impression:      1.  No acute intraabdominal findings.  2.  Hepatic steatosis.  3.  Stable small bilateral adrenal myelolipomas.               Narrative:    CT ABDOMEN PELVIS WO CONTRAST    HISTORY: 58-year-old male with nausea, vomiting, diarrhea and decreased  appetite.     COMPARISON: Multiple prior CT studies, most recent  CT abdomen pelvis of  January 3, 2019     TECHNIQUE:  Axial images through the abdomen and pelvis. Sagittal and coronal  reformats.    IV contrast: None.   Oral contrast:  None.    FINDINGS, ABDOMEN/PELVIS:  Examination of the visceral organs is limited by the lack of oral or IV  contrast.    LUNG BASES: Bilateral dependent atelectasis and linear subsegmental right  lower lobe atelectasis.    LIVER: Hepatic steatosis.  GALLBLADDER: Cholecystectomy.  SPLEEN: Unremarkable.  PANCREAS:  Unremarkable.  ADRENAL GLANDS: Unchanged small bilateral adrenal myelolipomas, measuring  up to 1.6 cm on the right and 1.1 cm on the left.    KIDNEYS: Unremarkable.    COLON: Unremarkable.  APPENDIX: Normal.  SMALL BOWEL: Unremarkable.  STOMACH: Unremarkable    MESENTERY AND LYMPH NODES: Unremarkable.  VASCULAR: Mild atherosclerotic disease, no abdominal aortic aneurysm.    BLADDER: Unremarkable.  PELVIS: No pelvic free fluid. Prostate is grossly unremarkable.    OSSEOUS: Moderate degenerative changes lumbar spine, similar to previous.  SOFT TISSUES: Unremarkable                                   XR Chest AP or PA (Final result)  Result time 07/21/19 03:46:09    Final result                 Impression:    IMPRESSION:      1.  No acute cardiopulmonary findings.  2.  Stable mild cardiomegaly.    I have personally reviewed the images and modified the resident's report as  necessary.               Narrative:    XR CHEST AP OR PA 7/21/2019 3:25 AM    HISTORY: 58 years Male, chest pain and dizziness. Fever    COMPARISON:  Radiograph May 4, 2019    TECHNIQUE:  Single, AP upright view of the chest.    FINDINGS:    EKG leads overlie the chest. Stable coronary stent.    Stable mild cardiac enlargement. The pulmonary vasculature is normally  distributed. No focal consolidation. Minimal strandy bibasilar atelectasis.  No pleural effusion. No pneumothorax.    The visualized osseous structures demonstrate no acute abnormality.                    Preliminary result                 Impression:          1.  No acute cardiopulmonary findings.  2.  Stable mild cardiomegaly.               Narrative:    XR CHEST AP OR PA 7/21/2019 3:25 AM    HISTORY: 58 years Male, chest pain and dizziness. Fever    COMPARISON:  Radiograph May 4, 2019    TECHNIQUE:  Single, AP upright view of the chest.    FINDINGS:    EKG leads overlie the chest. Stable cardiac stent.    Stable mild cardiac enlargement. The pulmonary vasculature is  normally  distributed. No focal consolidation. Mild strandy bibasilar atelectasis.   No  pleural effusion. No pneumothorax.    The visualized osseous structures demonstrate no acute abnormality.                                  ED Medication Orders (From admission, onward)    Start Ordered     Status Ordering Provider    07/21/19 0436 07/21/19 0435  sodium chloride (NORMAL SALINE) 0.9 % bolus 500 mL  ONCE      Last MAR action:  Completed KRISTY UGNTER    07/21/19 0436 07/21/19 0435  magnesium sulfate 1 g in dextrose 5% 100 mL IVPB premix  ONCE      Last MAR action:  Completed KRISTY GUNTER    07/21/19 0402 07/21/19 0401  ondansetron (ZOFRAN) injection 4 mg  ONCE      Last MAR action:  Given KRISTY GUNTER    07/21/19 0246 07/21/19 0245  acetaminophen (TYLENOL) tablet 650 mg  ONCE      Last MAR action:  Given KRISTY GUNTER    07/21/19 0245 07/21/19 0244  sodium chloride (NORMAL SALINE) 0.9 % bolus 500 mL  ONCE      Last MAR action:  Completed KRISTY GUNTER    07/21/19 0245 07/21/19 0244  famotidine (PEPCID) injection 20 mg  ONCE      Last MAR action:  Given KRISTY GUNTER    07/21/19 0245 07/21/19 0244  magnesium sulfate 2 g in 50 mL premix IVPB  ONCE      Last MAR action:  Completed KRISTY GUNTER    07/21/19 0245 07/21/19 0244  ondansetron (ZOFRAN) injection 4 mg  ONCE      Last MAR action:  Given KRISTY GUNTER               MDM  Vitals  Vitals:    07/21/19 0517 07/21/19 0530 07/21/19 0545 07/21/19 0600   BP: 147/74 141/72 132/60 144/68   Pulse: 98 92 94 97   Resp:    22   Temp:       TempSrc:       SpO2: 93%      Weight:       Height:           ED Course  Initial Impression: The pt is a 58 year old male who presents to the ED via EMS from home for evaluation of N/V/D which began early morning yesterday and pt later developed CP around 9:00 PM which was brief, now resolved. PMHx includes type II DM, HLD and CAD with recent hospitalizations for CP and normal  stress test. I performed my initial evaluation of the pt. The plan of care for the pt will be for CXR, ECG and lab workup. The pt understands the plan of care. All further questions are answered.        4:01 AM RN update  Pt is actively vomiting, Zofran was ordered.     4:34 AM I have rechecked the pt who was just vomiting but has not had any diarrhea while he has been here. He is not feeling improved. Additional medication were ordered as well as a CT abd to further evaluate the pt. The pt understands the plan of care. All further questions are answered.       5:19 AM I have rechecked the pt who is resting comfortably. He is feeling much better after the medications. He no longer has any chest pain or nausea.  He still has not had any further diarrhea. We discussed the pt's final ED results in detail. Plan to repeat lactate, troponin and ECG.  The pt understands the plan of care. All further questions are answered.       6:29 AM I have rechecked the pt who appears comfortable in bed. We discussed the pt's final ED results in detail including the repeat lactate and troponin. The pt should f/u with his PCP but knows to return to the ED if he develops chest pain or symptoms reoccur.  He was informed to return to the ED if there are any new or worsening sx. The pt understands the plan of care. All further questions are answered.      MDM  Critical Care time spent on this patient outside of billable procedures:  None    Clinical Impression: Likely gastroenteritis.  Attempted to obtain cultures of stool given history of recent antibiotics, but he did not have any further diarrhea in ED.  Additionally, he has no significant leukocytosis, making c-diff or invasive enteric pathogen very unlikely.  With respect to chest pain, this could have been secondary to vomiting and retching. No evidence of esophageal perforation.  His EKG and Troponin X 2 are unremarkable for ACS and he recently had unremarkable stress test. Given  asymptomatic, further inpatient cardiac monitoring deferred.  No evidence of GI bleed.  He is currently symptom free and tolerating PO.  Heart rate improved.  No indication for admission but he understands that he should return to the ED if symptoms worsen.   ED Diagnoses        Final diagnoses    Non-intractable vomiting with nausea, unspecified vomiting type          Diarrhea, unspecified type                The patient was provided with a recommendation to follow up with a primary care provider and obtain reassessment of his/her blood pressure within three months.    Follow Up:  Cristobal Puga,   [address]  [address]  [facility] [id number]  [phone]    In 2 days         Pt is discharged to home/self care in stable condition.      I have reviewed the information recorded by the scribe for accuracy and agree with its contents.    ____________________________________________________________________    Francoise Cuevas acting as a scribe for Dr. Tawnya Purcell  Dictation SER # [id number]  Scribe: Francoise Purcell MD  07/21/19 1124     PAST MEDICAL HISTORY:  Asthma     Bipolar disorder     Diabetes     Endometriosis     Factor V Leiden     GERD (gastroesophageal reflux disease)     History of DVT in adulthood RLE on eliquis    HTN (hypertension)     Hypothyroid     Insomnia     Seasonal allergies     Seizure

## 2024-10-16 NOTE — ED PROVIDER NOTE - PATIENT PORTAL LINK FT
You can access the FollowMyHealth Patient Portal offered by [facility] by registering at the following website: [email or web address]. By joining [employer]’s FollowMyHealth portal, you will also be able to view your health information using other applications (apps) compatible with our system.

## 2024-10-16 NOTE — PROVIDER CONTACT NOTE (OTHER) - ASSESSMENT
Met with pt at bedside-pt reports having difficulty with staff at her SNF i.e. issues dealing with staff personalities, expectations, and her behavioral/emotional reactions, as well as being a young person in a skilled nurising facility.  SW validated pt's feelings; explored her support system.  Pt identified her care manager, facility SW, and a counselor she recently met with as a possible source of support to help develop effective coping skills.

## 2024-10-17 LAB
CULTURE RESULTS: SIGNIFICANT CHANGE UP
SPECIMEN SOURCE: SIGNIFICANT CHANGE UP

## 2024-10-22 ENCOUNTER — EMERGENCY (EMERGENCY)
Facility: HOSPITAL | Age: 39
LOS: 1 days | Discharge: HOME | End: 2024-10-22
Attending: EMERGENCY MEDICINE | Admitting: EMERGENCY MEDICINE
Payer: MEDICARE

## 2024-10-22 VITALS
SYSTOLIC BLOOD PRESSURE: 91 MMHG | OXYGEN SATURATION: 98 % | TEMPERATURE: 100 F | DIASTOLIC BLOOD PRESSURE: 60 MMHG | RESPIRATION RATE: 16 BRPM | HEART RATE: 110 BPM | HEIGHT: 71 IN

## 2024-10-22 PROCEDURE — 99285 EMERGENCY DEPT VISIT HI MDM: CPT

## 2024-10-22 NOTE — ED ADULT TRIAGE NOTE - CHIEF COMPLAINT QUOTE
alert oriented [facility] c/o heavy menstrual period with clots takes Eliquis   factor 5 Leiden   c/o dizziness  appears pale in triage  Blood Pressure 91/60

## 2024-10-23 VITALS
DIASTOLIC BLOOD PRESSURE: 80 MMHG | OXYGEN SATURATION: 100 % | RESPIRATION RATE: 16 BRPM | TEMPERATURE: 99 F | HEART RATE: 101 BPM | SYSTOLIC BLOOD PRESSURE: 98 MMHG

## 2024-10-23 LAB
ALBUMIN SERPL ELPH-MCNC: 4.3 G/DL — SIGNIFICANT CHANGE UP (ref 3.3–5)
ALP SERPL-CCNC: 65 U/L — SIGNIFICANT CHANGE UP (ref 40–120)
ALT FLD-CCNC: 42 U/L — HIGH (ref 4–33)
ANION GAP SERPL CALC-SCNC: 12 MMOL/L — SIGNIFICANT CHANGE UP (ref 7–14)
APTT BLD: 30.7 SEC — SIGNIFICANT CHANGE UP (ref 24.5–35.6)
AST SERPL-CCNC: 27 U/L — SIGNIFICANT CHANGE UP (ref 4–32)
BASOPHILS # BLD AUTO: 0.01 K/UL — SIGNIFICANT CHANGE UP (ref 0–0.2)
BASOPHILS NFR BLD AUTO: 0.2 % — SIGNIFICANT CHANGE UP (ref 0–2)
BILIRUB SERPL-MCNC: 0.2 MG/DL — SIGNIFICANT CHANGE UP (ref 0.2–1.2)
BLD GP AB SCN SERPL QL: NEGATIVE — SIGNIFICANT CHANGE UP
BUN SERPL-MCNC: 13 MG/DL — SIGNIFICANT CHANGE UP (ref 7–23)
CALCIUM SERPL-MCNC: 9.6 MG/DL — SIGNIFICANT CHANGE UP (ref 8.4–10.5)
CHLORIDE SERPL-SCNC: 104 MMOL/L — SIGNIFICANT CHANGE UP (ref 98–107)
CO2 SERPL-SCNC: 22 MMOL/L — SIGNIFICANT CHANGE UP (ref 22–31)
CREAT SERPL-MCNC: 0.77 MG/DL — SIGNIFICANT CHANGE UP (ref 0.5–1.3)
EGFR: 102 ML/MIN/1.73M2 — SIGNIFICANT CHANGE UP
EOSINOPHIL # BLD AUTO: 0.03 K/UL — SIGNIFICANT CHANGE UP (ref 0–0.5)
EOSINOPHIL NFR BLD AUTO: 0.5 % — SIGNIFICANT CHANGE UP (ref 0–6)
EOSINOPHIL NFR BLD AUTO: 0.5 % — SIGNIFICANT CHANGE UP (ref 0–6)
EOSINOPHIL NFR BLD AUTO: 0.6 % — SIGNIFICANT CHANGE UP (ref 0–6)
GLUCOSE SERPL-MCNC: 95 MG/DL — SIGNIFICANT CHANGE UP (ref 70–99)
HCG SERPL-ACNC: <1 MIU/ML — SIGNIFICANT CHANGE UP
HCT VFR BLD CALC: 26 % — LOW (ref 34.5–45)
HCT VFR BLD CALC: 32.3 % — LOW (ref 34.5–45)
HCT VFR BLD CALC: 36.6 % — SIGNIFICANT CHANGE UP (ref 34.5–45)
HGB BLD-MCNC: 10.8 G/DL — LOW (ref 11.5–15.5)
HGB BLD-MCNC: 12.4 G/DL — SIGNIFICANT CHANGE UP (ref 11.5–15.5)
HGB BLD-MCNC: 8.7 G/DL — LOW (ref 11.5–15.5)
IANC: 2.61 K/UL — SIGNIFICANT CHANGE UP (ref 1.8–7.4)
IANC: 2.79 K/UL — SIGNIFICANT CHANGE UP (ref 1.8–7.4)
IANC: 3.1 K/UL — SIGNIFICANT CHANGE UP (ref 1.8–7.4)
IMM GRANULOCYTES NFR BLD AUTO: 0.3 % — SIGNIFICANT CHANGE UP (ref 0–0.9)
IMM GRANULOCYTES NFR BLD AUTO: 0.3 % — SIGNIFICANT CHANGE UP (ref 0–0.9)
IMM GRANULOCYTES NFR BLD AUTO: 0.6 % — SIGNIFICANT CHANGE UP (ref 0–0.9)
INR BLD: 1.22 RATIO — HIGH (ref 0.85–1.16)
LYMPHOCYTES # BLD AUTO: 2.13 K/UL — SIGNIFICANT CHANGE UP (ref 1–3.3)
LYMPHOCYTES # BLD AUTO: 2.19 K/UL — SIGNIFICANT CHANGE UP (ref 1–3.3)
LYMPHOCYTES # BLD AUTO: 2.94 K/UL — SIGNIFICANT CHANGE UP (ref 1–3.3)
LYMPHOCYTES # BLD AUTO: 37.1 % — SIGNIFICANT CHANGE UP (ref 13–44)
LYMPHOCYTES # BLD AUTO: 39.4 % — SIGNIFICANT CHANGE UP (ref 13–44)
LYMPHOCYTES # BLD AUTO: 46.1 % — HIGH (ref 13–44)
MCHC RBC-ENTMCNC: 31.3 PG — SIGNIFICANT CHANGE UP (ref 27–34)
MCHC RBC-ENTMCNC: 31.5 PG — SIGNIFICANT CHANGE UP (ref 27–34)
MCHC RBC-ENTMCNC: 31.8 PG — SIGNIFICANT CHANGE UP (ref 27–34)
MCHC RBC-ENTMCNC: 33.4 GM/DL — SIGNIFICANT CHANGE UP (ref 32–36)
MCHC RBC-ENTMCNC: 33.5 GM/DL — SIGNIFICANT CHANGE UP (ref 32–36)
MCHC RBC-ENTMCNC: 33.5 GM/DL — SIGNIFICANT CHANGE UP (ref 32–36)
MCV RBC AUTO: 93.4 FL — SIGNIFICANT CHANGE UP (ref 80–100)
MCV RBC AUTO: 94.2 FL — SIGNIFICANT CHANGE UP (ref 80–100)
MCV RBC AUTO: 95 FL — SIGNIFICANT CHANGE UP (ref 80–100)
MONOCYTES # BLD AUTO: 0.56 K/UL — SIGNIFICANT CHANGE UP (ref 0–0.9)
MONOCYTES # BLD AUTO: 0.59 K/UL — SIGNIFICANT CHANGE UP (ref 0–0.9)
MONOCYTES # BLD AUTO: 0.59 K/UL — SIGNIFICANT CHANGE UP (ref 0–0.9)
MONOCYTES NFR BLD AUTO: 10.9 % — SIGNIFICANT CHANGE UP (ref 2–14)
MONOCYTES NFR BLD AUTO: 9.2 % — SIGNIFICANT CHANGE UP (ref 2–14)
MONOCYTES NFR BLD AUTO: 9.5 % — SIGNIFICANT CHANGE UP (ref 2–14)
NEUTROPHILS # BLD AUTO: 2.61 K/UL — SIGNIFICANT CHANGE UP (ref 1.8–7.4)
NEUTROPHILS # BLD AUTO: 2.79 K/UL — SIGNIFICANT CHANGE UP (ref 1.8–7.4)
NEUTROPHILS # BLD AUTO: 3.1 K/UL — SIGNIFICANT CHANGE UP (ref 1.8–7.4)
NEUTROPHILS NFR BLD AUTO: 43.7 % — SIGNIFICANT CHANGE UP (ref 43–77)
NEUTROPHILS NFR BLD AUTO: 48.3 % — SIGNIFICANT CHANGE UP (ref 43–77)
NEUTROPHILS NFR BLD AUTO: 52.4 % — SIGNIFICANT CHANGE UP (ref 43–77)
NRBC # BLD: 0 /100 WBCS — SIGNIFICANT CHANGE UP (ref 0–0)
NRBC # FLD: 0 K/UL — SIGNIFICANT CHANGE UP (ref 0–0)
PLATELET # BLD AUTO: 135 K/UL — LOW (ref 150–400)
PLATELET # BLD AUTO: 148 K/UL — LOW (ref 150–400)
PLATELET # BLD AUTO: 151 K/UL — SIGNIFICANT CHANGE UP (ref 150–400)
POTASSIUM SERPL-MCNC: 4.6 MMOL/L — SIGNIFICANT CHANGE UP (ref 3.5–5.3)
POTASSIUM SERPL-SCNC: 4.6 MMOL/L — SIGNIFICANT CHANGE UP (ref 3.5–5.3)
PROT SERPL-MCNC: 7.3 G/DL — SIGNIFICANT CHANGE UP (ref 6–8.3)
PROTHROM AB SERPL-ACNC: 14.5 SEC — HIGH (ref 9.9–13.4)
RBC # BLD: 2.76 M/UL — LOW (ref 3.8–5.2)
RBC # BLD: 3.4 M/UL — LOW (ref 3.8–5.2)
RBC # BLD: 3.93 M/UL — SIGNIFICANT CHANGE UP (ref 3.8–5.2)
RBC # FLD: 14.2 % — SIGNIFICANT CHANGE UP (ref 10.3–14.5)
RBC # FLD: 14.5 % — SIGNIFICANT CHANGE UP (ref 10.3–14.5)
RBC # FLD: 14.7 % — HIGH (ref 10.3–14.5)
RH IG SCN BLD-IMP: POSITIVE — SIGNIFICANT CHANGE UP
SODIUM SERPL-SCNC: 138 MMOL/L — SIGNIFICANT CHANGE UP (ref 135–145)
WBC # BLD: 5.4 K/UL — SIGNIFICANT CHANGE UP (ref 3.8–10.5)
WBC # BLD: 5.91 K/UL — SIGNIFICANT CHANGE UP (ref 3.8–10.5)
WBC # BLD: 6.38 K/UL — SIGNIFICANT CHANGE UP (ref 3.8–10.5)
WBC # FLD AUTO: 5.4 K/UL — SIGNIFICANT CHANGE UP (ref 3.8–10.5)
WBC # FLD AUTO: 5.91 K/UL — SIGNIFICANT CHANGE UP (ref 3.8–10.5)
WBC # FLD AUTO: 6.38 K/UL — SIGNIFICANT CHANGE UP (ref 3.8–10.5)

## 2024-10-23 PROCEDURE — 93010 ELECTROCARDIOGRAM REPORT: CPT

## 2024-10-23 RX ORDER — ALPRAZOLAM 0.5 MG/1
0.5 TABLET ORAL ONCE
Refills: 0 | Status: DISCONTINUED | OUTPATIENT
Start: 2024-10-23 | End: 2024-10-23

## 2024-10-23 RX ORDER — ACETAMINOPHEN 325 MG
975 TABLET ORAL ONCE
Refills: 0 | Status: COMPLETED | OUTPATIENT
Start: 2024-10-23 | End: 2024-10-23

## 2024-10-23 RX ORDER — ACETAMINOPHEN 325 MG
650 TABLET ORAL ONCE
Refills: 0 | Status: COMPLETED | OUTPATIENT
Start: 2024-10-23 | End: 2024-10-23

## 2024-10-23 RX ORDER — SODIUM CHLORIDE 0.9 % (FLUSH) 0.9 %
1000 SYRINGE (ML) INJECTION ONCE
Refills: 0 | Status: COMPLETED | OUTPATIENT
Start: 2024-10-23 | End: 2024-10-23

## 2024-10-23 RX ORDER — DIPHENHYDRAMINE HCL 12.5MG/5ML
50 LIQUID (ML) ORAL ONCE
Refills: 0 | Status: COMPLETED | OUTPATIENT
Start: 2024-10-23 | End: 2024-10-23

## 2024-10-23 RX ADMIN — Medication 50 MILLIGRAM(S): at 10:34

## 2024-10-23 RX ADMIN — Medication 750 MILLIGRAM(S): at 12:46

## 2024-10-23 RX ADMIN — Medication 650 MILLIGRAM(S): at 11:03

## 2024-10-23 RX ADMIN — Medication 1000 MILLILITER(S): at 02:40

## 2024-10-23 RX ADMIN — Medication 650 MILLIGRAM(S): at 10:33

## 2024-10-23 RX ADMIN — Medication 975 MILLIGRAM(S): at 01:24

## 2024-10-23 RX ADMIN — ALPRAZOLAM 0.5 MILLIGRAM(S): 0.5 TABLET ORAL at 18:05

## 2024-10-23 NOTE — ED PROVIDER NOTE - ATTENDING CONTRIBUTION TO CARE
40 y/o F with h/o HTN, DM, Factor V Leiden with previous PE/DVT on eliquis, endometriosis from NH for vaginal bleeding.  Pt endorses DUB due to her endometriosis (irregular and heavy periods, LMP in July 2024).  She reports bleeding starting yesterday, associated with nausea, pelvic cramps, lightheadedness upon standing.  Pt reports passing clots and changing her pads frequently (unable to enumerate).  No fever, cp, sob, palpitations, LOC/syncope.    Pt lying in stretcher, awake and alert, nontoxic.  NCAT no conjunctival pallor.  VSS, tachycardia.  Lungs cta bl.  Cards nl S1/S2, tachy, regular, no MRG.  Abd soft obese ntnd.  Pt deferred pelvic exam, but noted blood-soaked diaper on exam.  No focal neuro deficits.    Will r/o pregnancy, eval for anemia requiring transfusion in this anticoagulated pt.  Plan for labs, ekg, transfuse as needed.

## 2024-10-23 NOTE — ED PROVIDER NOTE - PHYSICAL EXAMINATION
GEN: NAD. A&Ox3. Non-toxic appearing.  HEENT: normocephalic, atraumatic, EOMI, PERRL, no scleral icterus, no conjuntival pallor, moist MM  CARDIAC: tachycardic, regular rhythm, S1, S2, no murmur. Radial pulses present and symmetric b/l  PULM: CTA B/L no wheeze, rhonchi, rales.   ABD: soft NT, ND, no rebound no guarding  : Chaperoned by PCA [place]. Declined pelvic exam but external genitalia noted for blood with soaked diaper   MSK: Moving all extremities, no edema  NEURO: no focal neurological deficits, CN 2-12 grossly intact  SKIN: warm, dry, no rash.

## 2024-10-23 NOTE — ED PROVIDER NOTE - NSFOLLOWUPINSTRUCTIONS_ED_ALL_ED_FT
Follow up with your PMD/GYN  within 1-2 days or you can call our clinic at [phone] for an appointment  Take all of your other medications as previously prescribed.  Worsening, continued or ANY new concerning symptoms return to the Emergency Department.    Blood Transfusion, Adult, Care After  The following information offers guidance on how to care for yourself after your procedure. Your health care provider may also give you more specific instructions. If you have problems or questions, contact your health care provider.    What can I expect after the procedure?  After the procedure, it is common to have:  Bruising and soreness where the IV was inserted.  A headache.    Call your local emergency number (911 in the [place]), or have someone call if:    You lose consciousness.    You have heavy vaginal bleeding.    You have severe chest pain.  When should I seek immediate care?    You have dark or bloody bowel movements.    When should I call my doctor?    Your symptoms are worse, even after treatment.    You have questions or concerns about your condition or care.  Follow these instructions at home:  IV insertion site care    Washing hands with soap and water.  A normal insertion site compared to an infected insertion site. The infected insertion site has swelling and redness.  Follow instructions from your health care provider about how to take care of your IV insertion site. Make sure you:  Wash your hands with soap and water for at least 20 seconds before and after you change your bandage (dressing). If soap and water are not available, use hand .  Change your dressing as told by your health care provider.  Check your IV insertion site every day for signs of infection. Check for:  Redness, swelling, or pain.  Bleeding from the site.  Warmth.  Pus or a bad smell.  General instructions    Take over-the-counter and prescription medicines only as told by your health care provider.  Rest as told by your health care provider.  Return to your normal activities as told by your health care provider.  Keep all follow-up visits. Lab tests may need to be done at certain periods to recheck your blood counts.  Contact a health care provider if:  You have itching or red, swollen areas of skin (hives).  You have a fever or chills.  You have pain in the head, back, or chest.  You feel anxious or you feel weak after doing your normal activities.  You have redness, swelling, warmth, or pain around the IV insertion site.  You have blood coming from the IV insertion site that does not stop with pressure.  You have pus or a bad smell coming from your IV insertion site.  If you received your blood transfusion in an outpatient setting, you will be told whom to contact to report any reactions.    Get help right away if:  You have symptoms of a serious allergic or immune system reaction, including:  Trouble breathing or shortness of breath.  Swelling of the face, feeling flushed, or widespread rash.  Dark urine or blood in the urine.  Fast heartbeat.  These symptoms may be an emergency. Get help right away. Call 911.  Do not wait to see if the symptoms will go away.  Do not drive yourself to the hospital.  Summary  Bruising and soreness around the IV insertion site are common.  Check your IV insertion site every day for signs of infection.  Rest as told by your health care provider. Return to your normal activities as told by your health care provider.  Get help right away for symptoms of a serious allergic or immune system reaction to the blood transfusion.
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
## 2024-10-23 NOTE — CHART NOTE - NSCHARTNOTEFT_GEN_A_CORE
Per provider, patient is cleared to return back to facility. Patient is reported to be from [facility] [address] [place] [id number]. RAVINDRA contacted facility [phone] and spoke with Isabel from Admissions. Isabel confirmed that patient can return to facility via ambulette or ambulance. RAVINDRA informed that patient having difficulty to ambulate and utilized wheelchair while in ED. RAVINDRA contacted [facility] to schedule a BLS ambulance. Transportation confirmed for 6:00 PM trip #217A. EMS form faxed to [facility] [phone]. No further SW needs.

## 2024-10-23 NOTE — ED PROVIDER NOTE - PROGRESS NOTE DETAILS
Patient with drop in hemoglobin.  States still endorsing lightheadedness.  Offered patient transfusion and agrees.  Consent in chart.  Олег Lopez PGY-3 JORGE L Ramirez: Case endorsed. Pt with symptomatic anemia s/p  blood 1U of prbc. plan to repeat cbc and dc home. gyn f/u, strict return precautions. H/H 12.4/36.6

## 2024-10-23 NOTE — ED ADULT NURSE REASSESSMENT NOTE - NS ED NURSE REASSESS COMMENT FT1
Pt resting in stretcher states "I am having an aura." endorsing palpitations. HR noted to be 109 at this time. Pt now not responding to questions. eyes open, unresponsive. JORGE L Garcia aware, MD Bautista aware. Awaiting further orders. Safety maintained.
Pt resting in stretcher, endorsing weakness and mild headache. offers no other complaints. Pt consent in chart, educated on s/sx of transfusion reactions verbalizes understanding. Blood checked with 2 RN bedside check. call bell within reach
post xanax, pt back to baseline mental status. a&o x4 and ambulatory with 1 assist at this time. RR equal and unlabored. safety maintained.
pt attempting to get out of stretcher. noted to be not following commands and confused at this time. pt not answering questions. eyes opened and awake. not able to be redirected at this time. JORGE L jiménez made aware of pt mental status. medicated as ordered. RR equal and unlabored. awaiting transport set up by social work. safety maintained.
pt ambulated to bathroom with wheelchair. Endorsing dizziness, abdominal cramps, and "heavy vaginal bleeding and clots"  at this time. Saturated pad and sheets. Endorsing seasonal allergies at this time. Requesting benadryl and pain medication. PA made aware. Awaiting further orders.
received report from nightshift RN. Pt A&O x4. Resting in stretcher. Endorsing chills, abdominal cramps, dizziness, and fatigue. Denies headache, SOB, chest pain, or fever. VSS. IV intact. Blood transfusion completed at 08:40. No signs of transfusion reaction noted. RR equal and unlabored. Abdomen soft, non distended, with bilateral lower tenderness. Care plan continued. Safety maintained.
pt A&Ox4, resting in stretcher. complaining of 10/10 cramping abdominal pain, endorsing "heavy bleeding", and mild dizziness. Denies chills. MD made aware. Awaiting further medication orders.

## 2024-10-23 NOTE — ED PROVIDER NOTE - ST/T WAVE
From: Antoinette Guardado  To: Christelle Odom-Tameka  Sent: 1/3/2022 3:41 PM CST  Subject: Diabetic Neuropathy    Hello, and happy New Year!  I am running out of the medication I take for my diabetic neuropathy (Gabapentin 300mg, 3 times daily). My endocrinologist suggested that I contact you for either managing/treating it, or to see if you recommend it is time to see a neurologist.  Would I need to come into the office to discuss or could we have a video visit for it? I can do which ever is more convenient for you.  Please let me know what you recommend.  Thank you, and have a great rest of your day!  ~Antoinette   No ST elevations

## 2024-10-23 NOTE — ED ADULT NURSE NOTE - OBJECTIVE STATEMENT
break coverage rn. pt received to room 13. A&Ox4, wheelchair bound per baseline. arrives from [facility] c/o pelvic pain and cramping associated with heavy menstruation soaking through pads with small clots. endorses intermittent periods of headache and dizziness and lethargy. denies chest pain, SOB, nausea, vomiting, numbness/tingling to hands/feet. BP within normal limits with MAP >65. HR within normal limits to 102. 20G placed to [facility], lab work collected and sent, pt aware to check blood work for hemoglobin and hematocrit levels,. denies history of blood transfusion at this time. safety maintained

## 2024-10-23 NOTE — ED PROVIDER NOTE - CLINICAL SUMMARY MEDICAL DECISION MAKING FREE TEXT BOX
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  States has gone through 4 diapers today.  Endorses lower abdominal cramping during this time.  States feels lightheaded when standing.  Denies chest pain, nausea, vomiting, diarrhea, dysuria, hematuria.  Per chart review patient also here 1 week ago with low abdominal pain and lightheadedness, found to have colitis at outside hospital given antibiotics.  Afebrile, tachycardic, not hypoxic, not hypotensive, lungs clear, low abdominal tenderness to palpation, declined pelvic exam but external noted for blood and soaked diaper.  Will evaluate for anemia in setting of vaginal bleeding while on Eliquis.  Lower concern for abdominal etiology at this time.  Will obtain labs, pain control, reassess.

## 2024-10-23 NOTE — ED ADULT NURSE NOTE - NSFALLUNIVINTERV_ED_ALL_ED
Bed/Stretcher in lowest position, wheels locked, appropriate side rails in place/Call bell, personal items and telephone in reach/Instruct patient to call for assistance before getting out of bed/chair/stretcher/Non-slip footwear applied when patient is off stretcher/[place] to call system/Physically safe environment - no spills, clutter or unnecessary equipment/Purposeful proactive rounding/Room/bathroom lighting operational, light cord in reach

## 2024-10-23 NOTE — ED PROVIDER NOTE - PATIENT PORTAL LINK FT
You can access the FollowMyHealth Patient Portal offered by [facility] by registering at the following website: [email or web address]. By joining [employer]’s FollowMyHealth portal, you will also be able to view your health information using other applications (apps) compatible with our system.

## 2024-10-23 NOTE — ED ADULT NURSE NOTE - CAS EDP DISCH TYPE
Nursing home Paramedian Forehead Flap Text: A decision was made to reconstruct the defect utilizing an interpolation axial flap and a staged reconstruction.  A telfa template was made of the defect.  This telfa template was then used to outline the paramedian forehead pedicle flap.  The donor area for the pedicle flap was then injected with anesthesia.  The flap was excised through the skin and subcutaneous tissue down to the layer of the underlying musculature.  The pedicle flap was carefully excised within this deep plane to maintain its blood supply.  The edges of the donor site were undermined.   The donor site was closed in a primary fashion.  The pedicle was then rotated into position and sutured.  Once the tube was sutured into place, adequate blood supply was confirmed with blanching and refill.  The pedicle was then wrapped with xeroform gauze and dressed appropriately with a telfa and gauze bandage to ensure continued blood supply and protect the attached pedicle.

## 2024-10-27 ENCOUNTER — EMERGENCY (EMERGENCY)
Facility: HOSPITAL | Age: 39
LOS: 1 days | Discharge: HOME | End: 2024-10-27
Attending: EMERGENCY MEDICINE | Admitting: EMERGENCY MEDICINE
Payer: MEDICARE

## 2024-10-27 VITALS
HEART RATE: 110 BPM | DIASTOLIC BLOOD PRESSURE: 71 MMHG | TEMPERATURE: 98 F | WEIGHT: 293 LBS | SYSTOLIC BLOOD PRESSURE: 105 MMHG | HEIGHT: 71 IN | RESPIRATION RATE: 18 BRPM | OXYGEN SATURATION: 98 %

## 2024-10-27 PROCEDURE — 99285 EMERGENCY DEPT VISIT HI MDM: CPT | Mod: GC

## 2024-10-27 NOTE — ED ADULT TRIAGE NOTE - CHIEF COMPLAINT QUOTE
From [facility] c/o lower abdominal pain and three episodes of watery diarrhea since 11am. BPD, HTN, DMT2, asthma.

## 2024-10-28 VITALS
OXYGEN SATURATION: 100 % | DIASTOLIC BLOOD PRESSURE: 63 MMHG | TEMPERATURE: 97 F | HEART RATE: 65 BPM | RESPIRATION RATE: 16 BRPM | SYSTOLIC BLOOD PRESSURE: 102 MMHG

## 2024-10-28 LAB
ALBUMIN SERPL ELPH-MCNC: 4.1 G/DL — SIGNIFICANT CHANGE UP (ref 3.3–5)
ALP SERPL-CCNC: 71 U/L — SIGNIFICANT CHANGE UP (ref 40–120)
ALT FLD-CCNC: 70 U/L — HIGH (ref 4–33)
ANION GAP SERPL CALC-SCNC: 14 MMOL/L — SIGNIFICANT CHANGE UP (ref 7–14)
AST SERPL-CCNC: 42 U/L — HIGH (ref 4–32)
BASOPHILS # BLD AUTO: 0.01 K/UL — SIGNIFICANT CHANGE UP (ref 0–0.2)
BASOPHILS NFR BLD AUTO: 0.2 % — SIGNIFICANT CHANGE UP (ref 0–2)
BILIRUB SERPL-MCNC: <0.2 MG/DL — SIGNIFICANT CHANGE UP (ref 0.2–1.2)
BUN SERPL-MCNC: 13 MG/DL — SIGNIFICANT CHANGE UP (ref 7–23)
CALCIUM SERPL-MCNC: 9.3 MG/DL — SIGNIFICANT CHANGE UP (ref 8.4–10.5)
CHLORIDE SERPL-SCNC: 105 MMOL/L — SIGNIFICANT CHANGE UP (ref 98–107)
CO2 SERPL-SCNC: 22 MMOL/L — SIGNIFICANT CHANGE UP (ref 22–31)
CREAT SERPL-MCNC: 0.72 MG/DL — SIGNIFICANT CHANGE UP (ref 0.5–1.3)
EGFR: 110 ML/MIN/1.73M2 — SIGNIFICANT CHANGE UP
EOSINOPHIL # BLD AUTO: 0.02 K/UL — SIGNIFICANT CHANGE UP (ref 0–0.5)
EOSINOPHIL NFR BLD AUTO: 0.3 % — SIGNIFICANT CHANGE UP (ref 0–6)
GLUCOSE SERPL-MCNC: 106 MG/DL — HIGH (ref 70–99)
HCG SERPL-ACNC: <1 MIU/ML — SIGNIFICANT CHANGE UP
HCT VFR BLD CALC: 32.5 % — LOW (ref 34.5–45)
HGB BLD-MCNC: 11 G/DL — LOW (ref 11.5–15.5)
IANC: 3.96 K/UL — SIGNIFICANT CHANGE UP (ref 1.8–7.4)
IMM GRANULOCYTES NFR BLD AUTO: 0.3 % — SIGNIFICANT CHANGE UP (ref 0–0.9)
LIDOCAIN IGE QN: 34 U/L — SIGNIFICANT CHANGE UP (ref 7–60)
LYMPHOCYTES # BLD AUTO: 1.75 K/UL — SIGNIFICANT CHANGE UP (ref 1–3.3)
LYMPHOCYTES # BLD AUTO: 27.5 % — SIGNIFICANT CHANGE UP (ref 13–44)
MCHC RBC-ENTMCNC: 31.5 PG — SIGNIFICANT CHANGE UP (ref 27–34)
MCHC RBC-ENTMCNC: 33.8 GM/DL — SIGNIFICANT CHANGE UP (ref 32–36)
MCV RBC AUTO: 93.1 FL — SIGNIFICANT CHANGE UP (ref 80–100)
MONOCYTES # BLD AUTO: 0.6 K/UL — SIGNIFICANT CHANGE UP (ref 0–0.9)
MONOCYTES NFR BLD AUTO: 9.4 % — SIGNIFICANT CHANGE UP (ref 2–14)
NEUTROPHILS # BLD AUTO: 3.96 K/UL — SIGNIFICANT CHANGE UP (ref 1.8–7.4)
NEUTROPHILS NFR BLD AUTO: 62.3 % — SIGNIFICANT CHANGE UP (ref 43–77)
NRBC # BLD: 0 /100 WBCS — SIGNIFICANT CHANGE UP (ref 0–0)
NRBC # FLD: 0 K/UL — SIGNIFICANT CHANGE UP (ref 0–0)
PLATELET # BLD AUTO: 148 K/UL — LOW (ref 150–400)
POTASSIUM SERPL-MCNC: 5.1 MMOL/L — SIGNIFICANT CHANGE UP (ref 3.5–5.3)
POTASSIUM SERPL-SCNC: 5.1 MMOL/L — SIGNIFICANT CHANGE UP (ref 3.5–5.3)
PROT SERPL-MCNC: 7.1 G/DL — SIGNIFICANT CHANGE UP (ref 6–8.3)
RBC # BLD: 3.49 M/UL — LOW (ref 3.8–5.2)
RBC # FLD: 14.5 % — SIGNIFICANT CHANGE UP (ref 10.3–14.5)
SODIUM SERPL-SCNC: 141 MMOL/L — SIGNIFICANT CHANGE UP (ref 135–145)
WBC # BLD: 6.36 K/UL — SIGNIFICANT CHANGE UP (ref 3.8–10.5)
WBC # FLD AUTO: 6.36 K/UL — SIGNIFICANT CHANGE UP (ref 3.8–10.5)

## 2024-10-28 PROCEDURE — 76937 US GUIDE VASCULAR ACCESS: CPT | Mod: 26

## 2024-10-28 PROCEDURE — 74177 CT ABD & PELVIS W/CONTRAST: CPT | Mod: 26,MC

## 2024-10-28 RX ORDER — SODIUM CHLORIDE 0.9 % (FLUSH) 0.9 %
1000 SYRINGE (ML) INJECTION ONCE
Refills: 0 | Status: COMPLETED | OUTPATIENT
Start: 2024-10-28 | End: 2024-10-28

## 2024-10-28 RX ORDER — ACETAMINOPHEN 325 MG
1000 TABLET ORAL ONCE
Refills: 0 | Status: COMPLETED | OUTPATIENT
Start: 2024-10-28 | End: 2024-10-28

## 2024-10-28 RX ORDER — DIPHENHYDRAMINE HCL 12.5MG/5ML
25 LIQUID (ML) ORAL ONCE
Refills: 0 | Status: COMPLETED | OUTPATIENT
Start: 2024-10-28 | End: 2024-10-28

## 2024-10-28 RX ADMIN — Medication 500 MILLIGRAM(S): at 04:43

## 2024-10-28 RX ADMIN — Medication 400 MILLIGRAM(S): at 01:42

## 2024-10-28 RX ADMIN — Medication 25 MILLIGRAM(S): at 04:42

## 2024-10-28 RX ADMIN — Medication 1000 MILLILITER(S): at 01:42

## 2024-10-28 NOTE — ED PROVIDER NOTE - CLINICAL SUMMARY MEDICAL DECISION MAKING FREE TEXT BOX
39 yr f with extensive past medical history of bipolar, DVT, HTN, Endometriosis, recent appendectomy presents due to abdominal pain. Started this morning after eating toast and a hard boiled egg. Has been the same since it started, worse with standing, better with laying down, describes it as a growling pain, 10/10 and constant. She ate some wings to "put something in her stomach." Denies chest pain shortness of breath vomiting, fever. Admits to three episodes of burning watery diarrhea and chills.   Meds: naltrexone, lexapro, methocarbamol, statin, apixaban  Allergies: ibuprofen, toradol, zofran, penicillin, trazadone    PE: NAD, MMM, lungs CTA, heart no MRG, RLQ abdominal tenderness, no CVA tenderness, no LE edema   ddx: gastritis, GERD, viral etiology lower concern for acute abdominal pathology pancreatitis, diverticiluitis   Plan: CBC, CMP, CT abdomen pelvis con, pain control

## 2024-10-28 NOTE — ED ADULT NURSE NOTE - CHIEF COMPLAINT QUOTE
From [facility] c/o lower abdominal pain and three episodes of watery diarrhea since 11am. BPD, HTN, DMT2, asthma.

## 2024-10-28 NOTE — ED PROVIDER NOTE - PATIENT PORTAL LINK FT
You can access the FollowMyHealth Patient Portal offered by [facility] by registering at the following website: [email or web address]. By joining [employer]’s FollowMyHealth portal, you will also be able to view your health information using other applications (apps) compatible with our system.

## 2024-10-28 NOTE — ED PROVIDER NOTE - ATTENDING CONTRIBUTION TO CARE
Attending note:   After face to face evaluation of this patient, I concur with above noted hx, pe, and care plan for this patient.  Navarro: 39-year-old female with history of bipolar disorder, history of DVT, hypertension and endometriosis.  Patient presents to ED with diffuse abdominal pain and few loose stools and chills.  Symptoms started after eating breakfast this morning and are worse with movement.  Patient states pain is intermittently worse and occasionally 10 out of 10.  Over the last few hours pain has become constant.  On exam patient is morbidly obese and appears quite comfortable.  Initial vitals show tachycardia but that is since improved however blood pressure is borderline.  Abdomen is soft with some diffuse mild tenderness more so on the right lower quadrant.  There is no CVA tenderness.  Rest of exam is as noted above.  Patient has history of appendectomy but other GI pathology is not ruled out will treat symptomatically and give pain medication IV fluids and CT scan.  Will reassess after.  Will monitor vital signs closely.

## 2024-10-28 NOTE — ED ADULT NURSE REASSESSMENT NOTE - NS ED NURSE REASSESS COMMENT FT1
Received patient from previous RN, Patient A&O x 4, documentation as noted. Patient reports a 9/10 pain on her left side and in her abdomen, stating that the pain medication from last night did not help. Requested percocet, MD placed order currently waiting for pharmacy to approve. Discharge in place, waiting for transport @10. Received patient from previous RN, Patient A&O x 4, documentation as noted. Patient reports a 9/10 pain on her left side and in her abdomen, stating that the pain medication from last night did not help. Requested percocet, MD placed order currently waiting for pharmacy to approve. Discharge in place, waiting for transport @9.

## 2024-10-28 NOTE — ED ADULT NURSE NOTE - NSFALLRISKINTERV_ED_ALL_ED

## 2024-10-28 NOTE — ED ADULT NURSE NOTE - OBJECTIVE STATEMENT
Pt received to rm 6, A&Ox4. Pt from [facility]. Pt hx DM type 2, HTN, BPD, asthma. Pt endorsing lower quadrant abdominal pain, diarrhea, intermittent nausea x 1 day. Pt LMP last week. Pt states the pain radiates across her lower abdomen. Pt denies vomiting, urinary symptoms, vaginal bleeding, headache, dizziness, SOB, chest pain verbalized. denies recent travel, sick contacts. respirations even and unlabored. abdomen soft, nondistended. safety maintained throughout, call bell in reach. pending further orders

## 2024-10-28 NOTE — ED ADULT NURSE REASSESSMENT NOTE - NS ED NURSE REASSESS COMMENT FT1
Pt resting in stretcher, no acute distress noted. Pt awaiting transportation to nursing home. IV removed. respirations even and unlabored. safety maintained throughout

## 2024-10-28 NOTE — ED ADULT NURSE REASSESSMENT NOTE - NS ED NURSE REASSESS COMMENT FT1
BREAK RN. Pt is resting in the stretcher. Pt is awaiting CT scan. Respirations are equal and unlabored bilaterally. Bed is in the lowest position and safety maintained.

## 2024-10-28 NOTE — ED PROVIDER NOTE - OBJECTIVE STATEMENT
39 yr f with extensive past medical history of bipolar, DVT, HTN (INCOMPLETE) 39 yr f with extensive past medical history of bipolar, DVT, HTN, Endometriosis, recent appendectomy presents due to abdominal pain. Started this morning after eating toast and a hard boiled egg. Has been the same since it started, worse with standing, better with laying down, describes it as a growling pain, 10/10 and constant. She ate some wings to "put something in her stomach." Denies chest pain shortness of breath vomiting, fever. Admits to three episodes of burning watery diarrhea and chills.   Meds: naltrexone, lexapro, methocarbamol, statin, apixaban  Allergies: ibuprofen, toradol, zofran, penicillin, trazadone

## 2024-10-28 NOTE — ED ADULT NURSE REASSESSMENT NOTE - NS ED NURSE REASSESS COMMENT FT1
Unable to obtain patent IV access for medications/CT scan. Multiple RN attempt. MD Hernandez made aware of need for US line to be placed

## 2024-10-28 NOTE — ED PROVIDER NOTE - PROGRESS NOTE DETAILS
Mat Owens MD PGY-3: spoke with  to check on transport. They are checking. Mat Owens MD PGY-3: pt complaining of 9 out of 10 abdominal pain. Pt has lots of allergies and states tylenol did not help before. Will give one time dose of percocet. Pt reports toleration to this previously and no allergy.

## 2024-10-28 NOTE — PROVIDER CONTACT NOTE (OTHER) - ACTION/TREATMENT ORDERED:
RN gave report to [facility] RN Supervisor Sameera. pt set up with ambulate ~60-90minutes with Edwin

## 2024-10-28 NOTE — ED ADULT NURSE REASSESSMENT NOTE - NS ED NURSE REASSESS COMMENT FT1
pt able to tolerate PO at this time. denies chest pain, headache, dizziness, nausea, vomiting verbalized. to be discharged at this time

## 2024-10-28 NOTE — ED ADULT NURSE REASSESSMENT NOTE - NS ED NURSE REASSESS COMMENT FT1
18G US guided IV placed to right forearm by MD. medicated per MAR. pending CT scan. safety maintained throughout

## 2024-10-30 NOTE — BH CONSULTATION LIAISON PROGRESS NOTE - NSBHPTASSESSDT_PSY_A_CORE
06-Apr-2022 10:24
04-Apr-2022 13:53
05-Apr-2022 13:52
07-Apr-2022 12:58
5A: Primary Prevention/Risk Reduction for Loss of Balance and Falling 6B: Impaired Aerobic Capacity/Endurance Associated with Deconditioning
03-Apr-2022 11:48

## 2024-11-04 ENCOUNTER — APPOINTMENT (OUTPATIENT)
Dept: INTERNAL MEDICINE | Facility: CLINIC | Age: 39
End: 2024-11-04

## 2024-11-07 ENCOUNTER — NON-APPOINTMENT (OUTPATIENT)
Age: 39
End: 2024-11-07

## 2024-11-07 ENCOUNTER — INPATIENT (INPATIENT)
Facility: HOSPITAL | Age: 39
LOS: 0 days | Discharge: NURSING, REHAB OR LONG-TERM CARE | DRG: 179 | End: 2024-11-08
Attending: STUDENT IN AN ORGANIZED HEALTH CARE EDUCATION/TRAINING PROGRAM | Admitting: STUDENT IN AN ORGANIZED HEALTH CARE EDUCATION/TRAINING PROGRAM
Payer: MEDICARE

## 2024-11-07 VITALS
RESPIRATION RATE: 18 BRPM | HEIGHT: 71 IN | DIASTOLIC BLOOD PRESSURE: 68 MMHG | HEART RATE: 105 BPM | TEMPERATURE: 98 F | OXYGEN SATURATION: 100 % | SYSTOLIC BLOOD PRESSURE: 100 MMHG | WEIGHT: 293 LBS

## 2024-11-07 DIAGNOSIS — U07.1 COVID-19: ICD-10-CM

## 2024-11-07 LAB
ALBUMIN SERPL ELPH-MCNC: 3.8 G/DL — SIGNIFICANT CHANGE UP (ref 3.5–5)
ALP SERPL-CCNC: 68 U/L — SIGNIFICANT CHANGE UP (ref 40–120)
ALT FLD-CCNC: 80 U/L DA — HIGH (ref 10–60)
ANION GAP SERPL CALC-SCNC: 7 MMOL/L — SIGNIFICANT CHANGE UP (ref 5–17)
AST SERPL-CCNC: 35 U/L — SIGNIFICANT CHANGE UP (ref 10–40)
BASOPHILS # BLD AUTO: 0.01 K/UL — SIGNIFICANT CHANGE UP (ref 0–0.2)
BASOPHILS NFR BLD AUTO: 0.2 % — SIGNIFICANT CHANGE UP (ref 0–2)
BILIRUB SERPL-MCNC: 0.3 MG/DL — SIGNIFICANT CHANGE UP (ref 0.2–1.2)
BUN SERPL-MCNC: 11 MG/DL — SIGNIFICANT CHANGE UP (ref 7–18)
CALCIUM SERPL-MCNC: 9.3 MG/DL — SIGNIFICANT CHANGE UP (ref 8.4–10.5)
CHLORIDE SERPL-SCNC: 108 MMOL/L — SIGNIFICANT CHANGE UP (ref 96–108)
CO2 SERPL-SCNC: 26 MMOL/L — SIGNIFICANT CHANGE UP (ref 22–31)
CREAT SERPL-MCNC: 0.83 MG/DL — SIGNIFICANT CHANGE UP (ref 0.5–1.3)
D DIMER BLD IA.RAPID-MCNC: 153 NG/ML DDU — SIGNIFICANT CHANGE UP
EGFR: 93 ML/MIN/1.73M2 — SIGNIFICANT CHANGE UP
EOSINOPHIL # BLD AUTO: 0.02 K/UL — SIGNIFICANT CHANGE UP (ref 0–0.5)
EOSINOPHIL NFR BLD AUTO: 0.4 % — SIGNIFICANT CHANGE UP (ref 0–6)
FLUAV AG NPH QL: SIGNIFICANT CHANGE UP
FLUBV AG NPH QL: SIGNIFICANT CHANGE UP
GLUCOSE SERPL-MCNC: 95 MG/DL — SIGNIFICANT CHANGE UP (ref 70–99)
HCG SERPL-ACNC: <1 MIU/ML — SIGNIFICANT CHANGE UP
HCT VFR BLD CALC: 33.1 % — LOW (ref 34.5–45)
HGB BLD-MCNC: 11 G/DL — LOW (ref 11.5–15.5)
IMM GRANULOCYTES NFR BLD AUTO: 0.4 % — SIGNIFICANT CHANGE UP (ref 0–0.9)
LYMPHOCYTES # BLD AUTO: 1.76 K/UL — SIGNIFICANT CHANGE UP (ref 1–3.3)
LYMPHOCYTES # BLD AUTO: 34.6 % — SIGNIFICANT CHANGE UP (ref 13–44)
MCHC RBC-ENTMCNC: 31.5 PG — SIGNIFICANT CHANGE UP (ref 27–34)
MCHC RBC-ENTMCNC: 33.2 G/DL — SIGNIFICANT CHANGE UP (ref 32–36)
MCV RBC AUTO: 94.8 FL — SIGNIFICANT CHANGE UP (ref 80–100)
MONOCYTES # BLD AUTO: 0.48 K/UL — SIGNIFICANT CHANGE UP (ref 0–0.9)
MONOCYTES NFR BLD AUTO: 9.4 % — SIGNIFICANT CHANGE UP (ref 2–14)
NEUTROPHILS # BLD AUTO: 2.79 K/UL — SIGNIFICANT CHANGE UP (ref 1.8–7.4)
NEUTROPHILS NFR BLD AUTO: 55 % — SIGNIFICANT CHANGE UP (ref 43–77)
NRBC # BLD: 0 /100 WBCS — SIGNIFICANT CHANGE UP (ref 0–0)
NT-PROBNP SERPL-SCNC: 52 PG/ML — SIGNIFICANT CHANGE UP (ref 0–125)
PLATELET # BLD AUTO: 140 K/UL — LOW (ref 150–400)
POTASSIUM SERPL-MCNC: 4.5 MMOL/L — SIGNIFICANT CHANGE UP (ref 3.5–5.3)
POTASSIUM SERPL-SCNC: 4.5 MMOL/L — SIGNIFICANT CHANGE UP (ref 3.5–5.3)
PROT SERPL-MCNC: 7.6 G/DL — SIGNIFICANT CHANGE UP (ref 6–8.3)
RBC # BLD: 3.49 M/UL — LOW (ref 3.8–5.2)
RBC # FLD: 14.8 % — HIGH (ref 10.3–14.5)
RSV RNA NPH QL NAA+NON-PROBE: SIGNIFICANT CHANGE UP
SARS-COV-2 RNA SPEC QL NAA+PROBE: DETECTED
SODIUM SERPL-SCNC: 141 MMOL/L — SIGNIFICANT CHANGE UP (ref 135–145)
TROPONIN I, HIGH SENSITIVITY RESULT: 4 NG/L — SIGNIFICANT CHANGE UP
TSH SERPL-MCNC: 0.33 UU/ML — LOW (ref 0.34–4.82)
WBC # BLD: 5.08 K/UL — SIGNIFICANT CHANGE UP (ref 3.8–10.5)
WBC # FLD AUTO: 5.08 K/UL — SIGNIFICANT CHANGE UP (ref 3.8–10.5)

## 2024-11-07 PROCEDURE — 71045 X-RAY EXAM CHEST 1 VIEW: CPT | Mod: 26

## 2024-11-07 PROCEDURE — 93010 ELECTROCARDIOGRAM REPORT: CPT

## 2024-11-07 PROCEDURE — 99223 1ST HOSP IP/OBS HIGH 75: CPT | Mod: GC

## 2024-11-07 PROCEDURE — 99285 EMERGENCY DEPT VISIT HI MDM: CPT

## 2024-11-07 RX ORDER — ALPRAZOLAM 0.25 MG
0.25 TABLET ORAL ONCE
Refills: 0 | Status: DISCONTINUED | OUTPATIENT
Start: 2024-11-07 | End: 2024-11-07

## 2024-11-07 RX ADMIN — Medication 0.25 MILLIGRAM(S): at 19:37

## 2024-11-07 NOTE — H&P ADULT - NSHPPHYSICALEXAM_GEN_ALL_CORE
PHYSICAL EXAM:  GENERAL: NAD, speaks in full sentences, no signs of respiratory distress  HEAD:  Atraumatic, Normocephalic  EYES: EOMI, PERRLA, conjunctiva and sclera clear  NECK: Supple, No JVD  CHEST/LUNG: Clear to auscultation bilaterally; No wheeze; No crackles; No accessory muscles used  HEART: Regular rate and rhythm; No murmurs;   ABDOMEN: Soft, Nontender, Nondistended; Bowel sounds present; No guarding  EXTREMITIES:  2+ Peripheral Pulses, No cyanosis or edema. No calf tenderness, or leg size discrepancies  PSYCH: AAOx3  NEUROLOGY: non-focal  SKIN: No rashes or lesions

## 2024-11-07 NOTE — H&P ADULT - PROBLEM SELECTOR PLAN 9
hx of anxiety and depression with multiple psychiatric conditions  c/w home meds per medication reconciliation (Clonidine, Robaxin, Flexeril, Benadryl, Lexapro, Seroquel, Risperidone)

## 2024-11-07 NOTE — H&P ADULT - PROBLEM SELECTOR PLAN 1
p/w 3 days of SOB, cough, fatigue, myalgias, sore throat  COVID-19 positive in ED    Supportive Care as needed  monitor off antibiotics, anti virals, and steroids as pt is afebrile, not hypoxic with labs unremarkable

## 2024-11-07 NOTE — ED ADULT NURSE NOTE - NS_SISCREENINGSR_GEN_ALL_ED
Verified Results  HEPATITIS, CHRONIC PANEL 10Sep2018 10:55AM DANIEL CHAMPION     Test Name Result Flag Reference   Hepatitis B Surface Ag NEGATIVE  NEGATIVE   HEP B INTERPRETATION      HEPATITIS B SURFACE ANTIBODY PRESENT, CONSISTENT WITH IMMUNITY   HEP B CORE IGG & IGM NEGATIVE  NEGATIVE   HEPATITIS C ANTIBODY NEGATIVE  NEGATIVE   HEP B SURFACE AB POSITIVE A NEGATIVE     HEMOGLOBIN A1C GLYCOSYLATED 10Sep2018 10:55AM DANIEL CHAMPION   [Sep 11, 2018 4:31PM DANIEL CHAMPION]  you are immune to HEP B     Test Name Result Flag Reference   HEMOGLOBIN A1C GLYH 6.1 % H 4.5-5.6   ----DIABETIC SCREENING---  NON DIABETIC                 <5.7%  INCREASED RISK                5.7-6.4%  DIAGNOSTIC FOR DIABETES      >6.4%     ----DIABETIC CONTROL---     A1C%           eAG mg/dL  6.0            126  6.5            140  7.0            154  7.5            169  8.0            183  8.5            197  9.0            212  9.5            226  10.0           508     COMP METABOLIC PANEL WITH CBCA, LIPID PANEL AND TSH (CMP,CBCA,LIPFA,TSH) 10Sep2018 10:55AM DANIEL CHAMPION   [Sep 11, 2018 4:31PM DANIEL CHAMPION]  IMPROVED KIDNEY FUNCTION  clearance is up to 87     Test Name Result Flag Reference   WHITE BLOOD COUNT 6.2 K/mcL  4.2-11.0   RED CELL COUNT 4.91 mil/mcL  4.00-5.20   HEMOGLOBIN 13.5 g/dl  12.0-15.5   HEMATOCRIT 42.0 %  36.0-46.5   MEAN CORPUSCULAR VOLUME 85.5 fL  78.0-100.0   MEAN CORPUSCULAR HEMOGLOBIN 27.5 pg  26.0-34.0   MEAN CORPUSCULAR HGB CONC 32.1 g/dl  32.0-36.5   RDW-CV 14.8 %  11.0-15.0   PLATELET COUNT 517 K/mcL  140-450   BERNARD% 76 %     LYM% 17 %     MON% 5 %     EOS% 1 %     BASO% 1 %     BERNARD ABS 4.7 K/mcL  1.8-7.7   LYM ABS 1.1 K/mcL  1.0-4.0   MON ABS 0.3 K/mcL  0.3-0.9   EOS ABS 0.1 K/mcL  0.1-0.5   BASO ABS 0.0 K/mcL  0.0-0.3   SODIUM 141 mmol/L  135-145   POTASSIUM 4.4 mmol/L  3.4-5.1   CHLORIDE 105 mmol/L     CARBON DIOXIDE 28 mmol/L  21-32   ANION GAP 12 mmol/L  10-20   GLUCOSE 100 mg/dl H 65-99   BUN 13 mg/dl  6-20 CREATININE 0.84 mg/dl  0.51-0.95   GFR EST.  AMER 87     eGFR 60 - 89 mL/min/1.73m2 = Mild decrease in kidney function. GFR EST. NONAFRI AMER 75     eGFR 60 - 89 mL/min/1.73m2 = Mild decrease in kidney function.    BUN/CREATININE RATIO 16  7-25   BILIRUBIN TOTAL 0.8 mg/dl  0.2-1.0   GOT/AST 22 Units/L  <38   ALKALINE PHOSPHATASE 101 Units/L     ALBUMIN 4.0 g/dl  3.6-5.1   TOTAL PROTEIN 7.6 g/dl  6.4-8.2   GLOBULIN (CALCULATED) 3.6 g/dl  2.0-4.0   A/G RATIO 1.1  1.0-2.4   CALCIUM 9.6 mg/dl  8.4-10.2   GPT/ALT 30 Units/L  <79   FASTING STATUS UNKNOWN hrs     CHOLESTEROL 210 mg/dl H <200   Desirable            <200  Borderline High      200 to 239  High                 >=240   HDL CHOLESTEROL 64 mg/dl  >49   Low            <40  Borderline Low 40 to 49  Near Optimal   50 to 59  Optimal        >=60   TRIGLYCERIDES 161 mg/dl H <150   Normal                   <150  Borderline High          150 to 199  High                     200 to 499  Very High                >=500   LDL CHOLESTEROL (CALCULATED) 114 mg/dl  <130   OPTIMAL               <100  NEAR OPTIMAL          100-129  BORDERLINE HIGH       130-159  HIGH                  160-189  VERY HIGH             >=190   NON-HDL CHOLESTEROL 146 mg/dl     Therapeutic Target:  CHD and risk equivalents <130  Multiple risk factors    <160  0 to 1 risk factors      <190   CHOLESTEROL/HDL RATIO 3.3  <4.5   TSH 2.052 mcUnits/mL  0.350-5.000   DIFF TYPE      AUTOMATED DIFFERENTIAL   FASTING STATUS UNKNOWN hrs     NRBC 0 /100 WBC  0   PERCENT IMMATURE GRANULOCYTES 0 %     ABSOLUTE IMMATURE GRANULOCYTES 0.0 K/mcL  0-0.2     MICROALBUMIN, URINE [id number] 10:55AM DANIEL CHAMPION     Test Name Result Flag Reference   MICROALBUMIN 1.06 mg/dl     CREATININE RANDOM URINE 157.00 mg/dl     MICROALB/CREAT RATIO 6.8 mg/g  <30 Negative

## 2024-11-07 NOTE — ED PROVIDER NOTE - NS ED ROS FT
Constitutional: no fevers, chills  HEENT: no HA, vision changes, rhinorrhea, sore throat  Cardiac: +chest pain, palpitations  Respiratory: +SOB, cough no hemoptysis  GI: no n/v/d/c, abd pain, bloody or dark stools  : no dysuria, frequency, or hematuria  MSK: no joint pain, neck pain or back pain  Skin: no rashes, jaundice, pruritis  Neuro: no numbness/tingling, weakness, unsteady gait  Psych: no depression, racing thoughts, or suicidal thoughts

## 2024-11-07 NOTE — H&P ADULT - HISTORY OF PRESENT ILLNESS
Pt is a 40 y/o F, [place] nursing home resident, with PMHx of explosive disorder, mild intellectual disability, CVA (in 2018 on Eliquis), left-sided hemiaplasia, bipolar disorder, seizure disorder, hyperlipidemia, depression, asthma, protein C resistance,  factor V Leiden deficiency w history of DVT/PE, hypothyroidism, GERD who presents to the ED from nursing home for 3 days of  worsening anxiety, chills, productive cough, SOB, palpitations, and fatigue. She also endorses worsening sore throat and mild runny nose over the last 24 hours.  Patient has a history of anxiety but reports feeling worse over the last few days. She endorses subjective fevers, headache, some myalgias as well. No leg swelling or calf pain noted at this time. Patient reports being fully compliant with her anticoagulation medications. Denies any known sick contacts or recent travel. In ED, pt positive for COVID-19. Admitted to medicine for COVID-19 management. Patient unable to return to nursing home at this time due to COVID-19.

## 2024-11-07 NOTE — ED PROVIDER NOTE - CLINICAL SUMMARY MEDICAL DECISION MAKING FREE TEXT BOX
39-year-old female history of cerebrovascular disease, left-sided hemiaplasia, protein C resistance, bipolar disorder, intermittent explosive disorder, seizure disorder, hypothyroidism, hyperlipidemia, depression, asthma, presents from nursing home for anxiety over the last 3 days, worsening today with associated chills, cough with blood-streaked sputum, shortness of breath and palpitations.  He was worse than her usual anxiety.  No associated leg swelling, weakness, abdominal pain.  Not on any hormonal medications but does have a history of VTE, compliant with her anticoagulants.  Arrives with nonactionable vital signs, nonischemic EKG given history of VTE will screen  with dimer, check lites, TSH, ACS. eval pna v viral syndrome. treat symptomatically dispo pending.

## 2024-11-07 NOTE — ED ADULT NURSE NOTE - NSFALLHARMRISKINTERV_ED_ALL_ED

## 2024-11-07 NOTE — ED ADULT NURSE REASSESSMENT NOTE - NS ED NURSE REASSESS COMMENT FT1
Hand off :  received in stable condition NAD. Patient is A&OX4 speaking in full comprehensible sentences unlabored breathing. Pt denies any discomfort at this moment. Patent 20G IV access RT A/C present, dressing is clean and dry. Continuum of care on going

## 2024-11-07 NOTE — ED ADULT NURSE NOTE - OBJECTIVE STATEMENT
Pt presents to the c/o panic attack that happened today x 1 episode. Pt reports feeling SOB whenever episodes occur. Pt currently is calm, denies any SOB, chest pain and discomfort. Pt denies SI/HI.

## 2024-11-07 NOTE — H&P ADULT - ASSESSMENT
Pt is a 38 y/o F, [place] nursing home resident, with PMHx of explosive disorder, mild intellectual disability, CVA (in 2018 on Eliquis), left-sided hemiaplasia, bipolar disorder, seizure disorder, hyperlipidemia, depression, asthma, protein C resistance,  factor V Leiden deficiency w history of DVT/PE, hypothyroidism, GERD who presents to the ED from nursing home for 3 days of  worsening anxiety, chills, productive cough, SOB, palpitations, and fatigue. Admitted to medicine for COVID-19 management. Patient unable to return to nursing home at this time due to COVID-19.

## 2024-11-07 NOTE — ED PROVIDER NOTE - PRO INTERPRETER NEED 2
Last office visit: 12/16/19 /80  Last refill: 11/20/19  Labs Due: 1/2/2020  No future appointments. English

## 2024-11-07 NOTE — H&P ADULT - PROBLEM SELECTOR PLAN 2
pt has hx of protein c deficiency and factor V leiden mutation with multiple PE and DVT in the past and hx of CVA in 2018  p/w SOB and myalgias  low suspicion for DVT or PE in ED given no leg erythema, or swelling and no hypoxia on exam  D dimer 153 (wnl)  c/w home med Eliquis 5 mg bid

## 2024-11-07 NOTE — ED PROVIDER NOTE - PROGRESS NOTE DETAILS
Terra (Nilson Doyle, DO NH unwilling to accept pt back due to covid. accepted to hospitalist service. MAR aware.

## 2024-11-07 NOTE — H&P ADULT - ATTENDING COMMENTS
IMAGING  Chest X-Ray  Pending official read; on my read no infiltrate, consolidation, or effusion.    EKG  Normal Sinus Rhythm, 96 bpm, QTc 490ms, MD 152ms, on my read no ST segment elevation or depression, no TWI    HPI  39 year old female patient with pmhx BPD, explosive disorder, mild intellectual disability, seizures/psychogenic nonepileptic seizures, past psychiatric admissions, 2 prior suicidal gestures/self-injurious behavior, prior CVA (2018) with left-sided deficits, hypothyroid, HTN, DM, asthma, GERD, factor V Leiden, prior DVT/PE on apixaban, PID, IBS, endometriosis, left shoulder abscess with drainage who presented to the ER from [facility] due to Panic attack.  In the ER, patient was given 0.25mg Xanax with resolution of her panic attack. Patient was found in the ER was found to have COVID. ER attempted to discharge patient back to [facility], but nursing home refused as patient now has COVID.    Review Of Systems included: + cold, + fatigue, + runny nose, + headache, + chills,   No SI/HI, no fever, no palpitations, no diarrhea, no constipation, no dysuria    Physical Exam  General: Awake, Alert, No acute distress  Cardiac: RRR  Pulmonary: CTA b/l  Abdominal: Soft, ND, NT  Extremities: No edema b/l    A/P  # Panic Attack  # Tachycardia  # Low TSH  > Heart rate 105 bpm  > Patient has Hypothyroidism on Synthroid but TSH is low  - Hydroxyzine prn for agitation  - Check Free T4    # COVID  > Good O2 saturation on room air  - Symptomatic Treatment; remdesivir and decadron not indicated as patient has good O2 saturation on room air  - Consult Case Management to determine when patient can return to the nursing home    # Hx of DM  - Insulin Sliding Scale  - Blood Glucose Monitoring  - Can monitor blood glucose for 24 hours before starting long acting insulin if needed    # Hx of BPD, explosive disorder, mild intellectual disability, seizures/psychogenic nonepileptic seizures, past psychiatric admissions, 2 prior suicidal gestures/self-injurious behavior, prior CVA (2018) with left-sided deficits, hypothyroid, HTN, asthma, GERD, factor V Leiden, prior DVT/PE on apixaban, PID, IBS, endometriosis, left shoulder abscess with drainage  - Continue home medications Synthroid, Risperidone, Clonidine, Depakote, Lexapro, Naltrexone, Eliquis, Maalox, Tylenol, Statin, Seroquel, Benadryl, Melatonin, Sucralfate, Flexeril, Methocarbamol    # DVT PPx  - Eliquis    # FEN  - Diabetic Diet  - Monitor and replete electrolytes as needed    Previous Admissions in Past Couple Months Included  10/28/2024: ER Visit for Abdominal pain  10/23/2024: ER Visit for Symptomatic Anemia  10/16/2024: ER Visit for Nausea and Vomiting  10/14/2024: ER Visit for Abdominal pain  10/7/2024: ER Visit for Chest pain  9/26/2024: ER Visit for Leg pain  9/12/2024 - 9/19/2024: Left Lower Extremity Numbness/Tingling. MRI negative. Whole Spine MRI: No evidence of demyelinating disease. Degenerative disc disease and disc herniation at the C5-C6 level. 7 mm pituitary gland lesion noted. Neurology reports no further inpatient neurological work-up indicated at this time. PT recommended LOY.  9/9/2024: ER Visit for Abdominal pain  9/8/2024: ER Visit for Abdominal pain  9/7/2024: ER Visit for Abdominal pain. Has a GI doctor, recently had colonoscopy endoscopy a few months ago that were unremarkable.  9/4/2024: ER Visit for Abdominal pain  9/2/2024: ER Visit for Occult blood in stools    Patient case and management was discussed with ER Attending  I did examine all labs (including CBC, D-Dimer, CMP, Troponin, HCG, TSH, pro-BNP, COVID/Flu/RSV Test), imaging, prior notes    Time-based billing (NON-critical care).  76 minutes spent on total encounter excluding any time spent teaching residents. The necessity of the time spent during the encounter on this date of service was due to: Review of records, vital signs, labs, microbiology, and imaging, Ordering labs and/or tests, General physical examination performed, Generation of treatment plan, Counseling of the patient and/or family members

## 2024-11-07 NOTE — ED PROVIDER NOTE - PHYSICAL EXAMINATION
General: non-toxic, NAD  HEENT: NCAT, PERRL, no thyromegaly or nodules appreciated. normal oropharynx.   Cardiac: RRR, no murmurs, 2+ peripheral pulses  Resp: CTAB  Abdomen: soft, non-distended, bowel sounds present, no ttp, no rebound or guarding. no organomegaly  Extremities: no peripheral edema, calf tenderness, or leg size discrepancies  Skin: no rashes  Neuro: AAOx4, 5+motor, sensation grossly intact CN 2-12 intact  Psych: mood and affect appropriate

## 2024-11-07 NOTE — H&P ADULT - NSHPREVIEWOFSYSTEMS_GEN_ALL_CORE
- CONSTITUTIONAL: + fever and chills +fatigue  - HEENT: Denies changes in vision and hearing.  - RESPIRATORY:+  SOB and cough.  - CV: Denies chest pain + palpitations  - GI: Denies abdominal pain, nausea, vomiting and diarrhea.  - : Denies dysuria and urinary frequency.  - SKIN: Denies rash and pruritus.  - NEUROLOGICAL: Denies headache and syncope.  - PSYCHIATRIC: Denies recent changes in mood. +hx of anxiety

## 2024-11-08 ENCOUNTER — TRANSCRIPTION ENCOUNTER (OUTPATIENT)
Age: 39
End: 2024-11-08

## 2024-11-08 VITALS
DIASTOLIC BLOOD PRESSURE: 48 MMHG | TEMPERATURE: 98 F | OXYGEN SATURATION: 98 % | RESPIRATION RATE: 18 BRPM | HEART RATE: 104 BPM | SYSTOLIC BLOOD PRESSURE: 96 MMHG

## 2024-11-08 DIAGNOSIS — Z86.718 PERSONAL HISTORY OF OTHER VENOUS THROMBOSIS AND EMBOLISM: ICD-10-CM

## 2024-11-08 DIAGNOSIS — Z86.59 PERSONAL HISTORY OF OTHER MENTAL AND BEHAVIORAL DISORDERS: ICD-10-CM

## 2024-11-08 DIAGNOSIS — E03.9 HYPOTHYROIDISM, UNSPECIFIED: ICD-10-CM

## 2024-11-08 DIAGNOSIS — Z86.2 PERSONAL HISTORY OF DISEASES OF THE BLOOD AND BLOOD-FORMING ORGANS AND CERTAIN DISORDERS INVOLVING THE IMMUNE MECHANISM: ICD-10-CM

## 2024-11-08 DIAGNOSIS — D68.59 OTHER PRIMARY THROMBOPHILIA: ICD-10-CM

## 2024-11-08 DIAGNOSIS — U07.1 COVID-19: ICD-10-CM

## 2024-11-08 DIAGNOSIS — Z29.9 ENCOUNTER FOR PROPHYLACTIC MEASURES, UNSPECIFIED: ICD-10-CM

## 2024-11-08 DIAGNOSIS — I63.9 CEREBRAL INFARCTION, UNSPECIFIED: ICD-10-CM

## 2024-11-08 DIAGNOSIS — E78.5 HYPERLIPIDEMIA, UNSPECIFIED: ICD-10-CM

## 2024-11-08 PROCEDURE — 99239 HOSP IP/OBS DSCHRG MGMT >30: CPT

## 2024-11-08 RX ORDER — ACETAMINOPHEN 500 MG
650 TABLET ORAL EVERY 6 HOURS
Refills: 0 | Status: DISCONTINUED | OUTPATIENT
Start: 2024-11-08 | End: 2024-11-08

## 2024-11-08 RX ORDER — ESCITALOPRAM 10 MG/1
20 TABLET, FILM COATED ORAL DAILY
Refills: 0 | Status: DISCONTINUED | OUTPATIENT
Start: 2024-11-08 | End: 2024-11-08

## 2024-11-08 RX ORDER — METHOCARBAMOL 500 MG/1
1000 TABLET ORAL DAILY
Refills: 0 | Status: DISCONTINUED | OUTPATIENT
Start: 2024-11-08 | End: 2024-11-08

## 2024-11-08 RX ORDER — MAGNESIUM, ALUMINUM HYDROXIDE 200-200 MG
30 TABLET,CHEWABLE ORAL EVERY 4 HOURS
Refills: 0 | Status: DISCONTINUED | OUTPATIENT
Start: 2024-11-08 | End: 2024-11-08

## 2024-11-08 RX ORDER — CYCLOBENZAPRINE HYDROCHLORIDE 30 MG/1
5 CAPSULE, EXTENDED RELEASE ORAL
Refills: 0 | Status: DISCONTINUED | OUTPATIENT
Start: 2024-11-08 | End: 2024-11-08

## 2024-11-08 RX ORDER — CLONIDINE HYDROCHLORIDE 0.2 MG/1
0.1 TABLET ORAL AT BEDTIME
Refills: 0 | Status: DISCONTINUED | OUTPATIENT
Start: 2024-11-08 | End: 2024-11-08

## 2024-11-08 RX ORDER — DIVALPROEX SODIUM 250 MG/1
750 TABLET, FILM COATED, EXTENDED RELEASE ORAL
Refills: 0 | Status: DISCONTINUED | OUTPATIENT
Start: 2024-11-08 | End: 2024-11-08

## 2024-11-08 RX ORDER — ALPRAZOLAM 0.25 MG
0.25 TABLET ORAL ONCE
Refills: 0 | Status: DISCONTINUED | OUTPATIENT
Start: 2024-11-08 | End: 2024-11-08

## 2024-11-08 RX ORDER — RISPERIDONE 3 MG/1
3 TABLET, FILM COATED ORAL AT BEDTIME
Refills: 0 | Status: DISCONTINUED | OUTPATIENT
Start: 2024-11-08 | End: 2024-11-08

## 2024-11-08 RX ORDER — KETOROLAC TROMETHAMINE 30 MG/ML
15 INJECTION INTRAMUSCULAR; INTRAVENOUS ONCE
Refills: 0 | Status: DISCONTINUED | OUTPATIENT
Start: 2024-11-08 | End: 2024-11-08

## 2024-11-08 RX ORDER — QUETIAPINE FUMARATE 200 MG/1
25 TABLET ORAL DAILY
Refills: 0 | Status: DISCONTINUED | OUTPATIENT
Start: 2024-11-08 | End: 2024-11-08

## 2024-11-08 RX ORDER — DIPHENHYDRAMINE HCL 12.5MG/5ML
25 ELIXIR ORAL EVERY 6 HOURS
Refills: 0 | Status: DISCONTINUED | OUTPATIENT
Start: 2024-11-08 | End: 2024-11-08

## 2024-11-08 RX ORDER — INFLUENZ VIR VAC TV P-SURF2003 15MCG/.5ML
0.5 SYRINGE (ML) INTRAMUSCULAR ONCE
Refills: 0 | Status: DISCONTINUED | OUTPATIENT
Start: 2024-11-08 | End: 2024-11-08

## 2024-11-08 RX ORDER — DIPHENHYDRAMINE HCL 12.5MG/5ML
50 ELIXIR ORAL AT BEDTIME
Refills: 0 | Status: DISCONTINUED | OUTPATIENT
Start: 2024-11-08 | End: 2024-11-08

## 2024-11-08 RX ORDER — SUCRALFATE 1 G/10ML
1 SUSPENSION ORAL
Refills: 0 | Status: DISCONTINUED | OUTPATIENT
Start: 2024-11-08 | End: 2024-11-08

## 2024-11-08 RX ORDER — MELATONIN 5 MG
5 TABLET ORAL AT BEDTIME
Refills: 0 | Status: DISCONTINUED | OUTPATIENT
Start: 2024-11-08 | End: 2024-11-08

## 2024-11-08 RX ORDER — LEVOTHYROXINE SODIUM 88 MCG
100 TABLET ORAL DAILY
Refills: 0 | Status: DISCONTINUED | OUTPATIENT
Start: 2024-11-08 | End: 2024-11-08

## 2024-11-08 RX ORDER — QUETIAPINE FUMARATE 200 MG/1
200 TABLET ORAL AT BEDTIME
Refills: 0 | Status: DISCONTINUED | OUTPATIENT
Start: 2024-11-08 | End: 2024-11-08

## 2024-11-08 RX ORDER — APIXABAN 5 MG/1
5 TABLET, FILM COATED ORAL EVERY 12 HOURS
Refills: 0 | Status: DISCONTINUED | OUTPATIENT
Start: 2024-11-08 | End: 2024-11-08

## 2024-11-08 RX ADMIN — SUCRALFATE 1 GRAM(S): 1 SUSPENSION ORAL at 07:06

## 2024-11-08 RX ADMIN — CLONIDINE HYDROCHLORIDE 0.1 MILLIGRAM(S): 0.2 TABLET ORAL at 03:00

## 2024-11-08 RX ADMIN — Medication 100 MICROGRAM(S): at 07:06

## 2024-11-08 RX ADMIN — RISPERIDONE 3 MILLIGRAM(S): 3 TABLET, FILM COATED ORAL at 02:59

## 2024-11-08 RX ADMIN — DIVALPROEX SODIUM 750 MILLIGRAM(S): 250 TABLET, FILM COATED, EXTENDED RELEASE ORAL at 07:06

## 2024-11-08 RX ADMIN — Medication 5 MILLIGRAM(S): at 03:00

## 2024-11-08 RX ADMIN — Medication 650 MILLIGRAM(S): at 08:54

## 2024-11-08 RX ADMIN — QUETIAPINE FUMARATE 25 MILLIGRAM(S): 200 TABLET ORAL at 12:28

## 2024-11-08 RX ADMIN — ESCITALOPRAM 20 MILLIGRAM(S): 10 TABLET, FILM COATED ORAL at 12:28

## 2024-11-08 RX ADMIN — Medication 650 MILLIGRAM(S): at 09:55

## 2024-11-08 RX ADMIN — METHOCARBAMOL 1000 MILLIGRAM(S): 500 TABLET ORAL at 12:28

## 2024-11-08 RX ADMIN — Medication 50 MILLIGRAM(S): at 02:59

## 2024-11-08 RX ADMIN — Medication 0.25 MILLIGRAM(S): at 04:13

## 2024-11-08 RX ADMIN — APIXABAN 5 MILLIGRAM(S): 5 TABLET, FILM COATED ORAL at 03:00

## 2024-11-08 RX ADMIN — QUETIAPINE FUMARATE 200 MILLIGRAM(S): 200 TABLET ORAL at 03:03

## 2024-11-08 RX ADMIN — METHOCARBAMOL 1000 MILLIGRAM(S): 500 TABLET ORAL at 03:00

## 2024-11-08 NOTE — DISCHARGE NOTE PROVIDER - NSDCMRMEDTOKEN_GEN_ALL_CORE_FT
acetaminophen 325 mg oral tablet: 2 tab(s) orally every 6 hours As needed Temp greater or equal to 38C (100.4F), Mild Pain (1 - 3)  aluminum hydroxide-magnesium hydroxide 200 mg-200 mg/5 mL oral suspension: 30 milliliter(s) orally every 4 hours As needed Dyspepsia  apixaban 5 mg oral tablet: 1 tab(s) orally every 12 hours  atorvastatin 80 mg oral tablet: 1 tab(s) orally once a day (at bedtime)  cloNIDine 0.1 mg oral tablet: 1 orally once a day (at bedtime)  cyclobenzaprine 5 mg oral tablet: 1 tab(s) orally 2 times a day As needed Muscle Spasm  Depakote  mg oral tablet, extended release: 3 tab(s) orally every 12 hours  diphenhydrAMINE 25 mg oral capsule: 1 cap(s) orally every 6 hours As needed Rash and/or Itching  diphenhydrAMINE 50 mg oral capsule: 1 cap(s) orally once a day (at bedtime)  levothyroxine 100 mcg (0.1 mg) oral tablet: 1 tab(s) orally once a day  Lexapro 20 mg oral tablet: 1 tab(s) orally once a day  melatonin 3 mg oral tablet: 1 tab(s) orally once a day (at bedtime)  methocarbamol 500 mg oral tablet: 2 tab(s) orally once a day  QUEtiapine 200 mg oral tablet: 1 tab(s) orally once a day (at bedtime)  QUEtiapine 25 mg oral tablet: 1 tab(s) orally once a day  risperiDONE 3 mg oral tablet: 1 orally once a day (at bedtime)  sucralfate 1 g oral tablet: 1 tab(s) orally 2 times a day

## 2024-11-08 NOTE — PROGRESS NOTE ADULT - ATTENDING COMMENTS
Pharmacist Intervention IV to PO Note    Tommy Way is a 62 y.o. male being treated with IV medication pantoprazole    Patient Data:    Vital Signs (Most Recent):  Temp: 98.1 °F (36.7 °C) (10/06/17 0300)  Pulse: 67 (10/06/17 0701)  Resp: 19 (10/06/17 0701)  BP: (!) 156/91 (10/06/17 0701)  SpO2: 95 % (10/06/17 0701)   Vital Signs (72h Range):  Temp:  [97.4 °F (36.3 °C)-99.1 °F (37.3 °C)]   Pulse:  [58-95]   Resp:  [8-47]   BP: (107-214)/()   SpO2:  [89 %-100 %]        Dietary Orders:  Diet Orders            Diet Adult Regular: Regular starting at 10/03 1228            Based on the following criteria, this patient qualifies for intravenous to oral conversion:  [x] The patients gastrointestinal tract is functioning (tolerating medications via oral or enteral route for 24 hours and tolerating food or enteral feeds for 24 hours).  [x] The patient is hemodynamically stable for 24 hours (heart rate <100 beats per minute, systolic blood pressure >99 mm Hg, and respiratory rate <20 breaths per minute).    IV pantoprazole 40 mg daily will be changed to PO pantoprazole 40 mg daily    Pharmacist's Name: Pam Pinon, PharmD  Pharmacist's Extension: [id number]     I have seen and evaluated the patient at the bedside. Set to DC back given that she has mild COVID without hypoxic respiratory failure.   Please see separate attestation and examination on discharge provider note.

## 2024-11-08 NOTE — PATIENT PROFILE ADULT - FALL HARM RISK - HARM RISK INTERVENTIONS
Assistance with ambulation/Assistance OOB with selected safe patient handling equipment/Communicate Risk of Fall with Harm to all staff/Discuss with provider need for PT consult/Monitor gait and stability/Reinforce activity limits and safety measures with patient and family/Tailored Fall Risk Interventions/Visual Cue: Yellow wristband and red socks/Bed in lowest position, wheels locked, appropriate side rails in place/Call bell, personal items and telephone in reach/Instruct patient to call for assistance before getting out of bed or chair/Non-slip footwear when patient is out of bed/[place] to call system/Physically safe environment - no spills, clutter or unnecessary equipment/Purposeful Proactive Rounding/Room/bathroom lighting operational, light cord in reach

## 2024-11-08 NOTE — DISCHARGE NOTE NURSING/CASE MANAGEMENT/SOCIAL WORK - PATIENT PORTAL LINK FT
You can access the FollowMyHealth Patient Portal offered by [facility] by registering at the following website: [email or web address]. By joining [employer]’s FollowMyHealth portal, you will also be able to view your health information using other applications (apps) compatible with our system.

## 2024-11-08 NOTE — DISCHARGE NOTE PROVIDER - HOSPITAL COURSE
Assessment	  Pt is a 38 y/o F, [place] nursing home resident, with PMHx of explosive disorder, mild intellectual disability, CVA (in 2018 on Eliquis), left-sided hemiaplasia, bipolar disorder, seizure disorder, hyperlipidemia, depression, asthma, protein C resistance,  factor V Leiden deficiency w history of DVT/PE, hypothyroidism, GERD who presents to the ED from nursing home for 3 days of  worsening anxiety, chills, productive cough, SOB, palpitations, and fatigue. Admitted to medicine for COVID-19 management. Patient unable to return to nursing home at this time due to COVID-19. No antibiotics or antivirals given because patient was stable with room air.   For pt has hx of protein c deficiency and factor V leiden mutation with multiple PE and DVT in the past and hx of CVA in 2018, She was on home Eliquis 5 mg bid.   For Hypothyroidism, she was continued on home med LT4 100 mcg qd.   For history of Cerebrovascular accident (CVA), she was continued on home statin.   For H/O bipolar disorder she was continued on Depakote, Risperidone.   For Hyperlipidemia she was continued on Statin.   For Anxiety and depression she was continued on Clonidine, Robaxin, Flexeril, Benadryl, Lexapro, Seroquel, Risperidone.    Pt is stable for discharge. Pt has been advised to follow up as outpatient. Case has been discussed with the attending. This is just a summary of the case. For further information please refer to pt. chart document.    	  Pt is a 38 y/o F, [place] nursing home resident, with PMHx of explosive disorder, mild intellectual disability, CVA (in 2018 on Eliquis), left-sided hemiaplasia, bipolar disorder, seizure disorder, hyperlipidemia, depression, asthma, protein C resistance,  factor V Leiden deficiency w history of DVT/PE, hypothyroidism, GERD who presents to the ED from nursing home for 3 days of  worsening anxiety, chills, productive cough, SOB, palpitations, and fatigue. Admitted to medicine for COVID-19 management. Patient unable to return to nursing home at this time due to COVID-19. No antibiotics or antivirals given because patient was stable with room air.   For pt has hx of protein c deficiency and factor V leiden mutation with multiple PE and DVT in the past and hx of CVA in 2018, She was on home Eliquis 5 mg bid.   For Hypothyroidism, she was continued on home med LT4 100 mcg qd.   For history of Cerebrovascular accident (CVA), she was continued on home statin.   For H/O bipolar disorder she was continued on Depakote, Risperidone.   For Hyperlipidemia she was continued on Statin.   For Anxiety and depression she was continued on Clonidine, Robaxin, Flexeril, Benadryl, Lexapro, Seroquel, Risperidone.    Pt is stable for discharge. Pt has been advised to follow up as outpatient. Case has been discussed with the attending. This is just a summary of the case. For further information please refer to pt. chart document.     Attending Attestation:    Agree with the above. Patient was admitted for COVID-19 from her nursing home. Her CXR was clear of infiltrate and she had no hypoxic respiratory failure. D-dimer normal. Did not require remdesivir or dexamethasone. She was cleared for DC back to her nursing home on 11/8.

## 2024-11-08 NOTE — DISCHARGE NOTE PROVIDER - ATTENDING DISCHARGE PHYSICAL EXAMINATION:
GENERAL: resting in bed in no distress  HEAD:  Atraumatic, Normocephalic; nasal congestion evident   EYES: conjunctiva and sclera clear  NECK: Supple, No JVD  CHEST/LUNG: Clear to auscultation bilaterally; No wheeze, rhonchi or crackles  HEART: Regular rate and rhythm  ABDOMEN: Soft, Nontender, Nondistended  EXTREMITIES: no edema  NEUROLOGY: non-focal  SKIN: No rashes or lesions

## 2024-11-08 NOTE — PROGRESS NOTE ADULT - SUBJECTIVE AND OBJECTIVE BOX
PGY-1 Progress Note discussed with attending    PAGER #: Author on Teams TILL 5:00 PM  PLEASE CONTACT ON CALL TEAM:  - On Call Team (Please refer to Patti) FROM 5:00 PM - 8:30PM  - Nightfloat Team FROM 8:30 -7:30 AM    CHIEF COMPLAINT & BRIEF HOSPITAL COURSE:    INTERVAL HPI/OVERNIGHT EVENTS: No acute overnight events. Patient complains of mild migraine/headache since yesterday. Denies fever, nausea, vomiting or sob.      REVIEW OF SYSTEMS:  CONSTITUTIONAL: No fever, weight loss, or fatigue  RESPIRATORY: No cough, wheezing, chills or hemoptysis; No shortness of breath  CARDIOVASCULAR: No chest pain, palpitations, dizziness, or leg swelling  GASTROINTESTINAL: No abdominal pain. No nausea, vomiting, or hematemesis; No diarrhea or constipation. No melena or hematochezia.  GENITOURINARY: No dysuria or hematuria, urinary frequency  NEUROLOGICAL: +mild migraine/headache , memory loss, loss of strength, numbness, or tremors  SKIN: No itching, burning, rashes, or lesions   PSYCHIATRIC: Denies recent changes in mood. +hx of anxiety    Vital Signs Last 24 Hrs  T(C): 36.6 (08 Nov 2024 05:43), Max: 36.7 (07 Nov 2024 16:50)  T(F): 97.9 (08 Nov 2024 05:43), Max: 98.1 (07 Nov 2024 16:50)  HR: 104 (08 Nov 2024 05:43) (87 - 105)  BP: 105/55 (08 Nov 2024 05:43) (92/63 - 105/55)  BP(mean): 66 (08 Nov 2024 05:43) (66 - 66)  RR: 17 (08 Nov 2024 05:43) (16 - 18)  SpO2: 96% (08 Nov 2024 05:43) (96% - 100%)    Parameters below as of 08 Nov 2024 05:43  Patient On (Oxygen Delivery Method): room air      PHYSICAL EXAM:  GENERAL: NAD, speaks in full sentences, no signs of respiratory distress  HEAD:  Atraumatic, Normocephalic  EYES: EOMI, PERRLA, conjunctiva and sclera clear  NECK: Supple, No JVD  CHEST/LUNG: Clear to auscultation bilaterally; No wheeze; No crackles; No accessory muscles used  HEART: Regular rate and rhythm; No murmurs;   ABDOMEN: Soft, Nontender, Nondistended; Bowel sounds present; No guarding  EXTREMITIES:  2+ Peripheral Pulses, No cyanosis or edema. No calf tenderness, or leg size discrepancies  PSYCH: AAOx3  NEUROLOGY: non-focal  SKIN: No rashes or lesions                        11.0   5.08  )-----------( 140      ( 07 Nov 2024 18:30 )             33.1     11-07    141  |  108  |  11  ----------------------------<  95  4.5   |  26  |  0.83    Ca    9.3      07 Nov 2024 18:30    TPro  7.6  /  Alb  3.8  /  TBili  0.3  /  DBili  x   /  AST  35  /  ALT  80[H]  /  AlkPhos  68  11-07    LIVER FUNCTIONS - ( 07 Nov 2024 18:30 )  Alb: 3.8 g/dL / Pro: 7.6 g/dL / ALK PHOS: 68 U/L / ALT: 80 U/L DA / AST: 35 U/L / GGT: x                   CAPILLARY BLOOD GLUCOSE      RADIOLOGY & ADDITIONAL TESTS:

## 2024-11-08 NOTE — DISCHARGE NOTE PROVIDER - NSDCCPCAREPLAN_GEN_ALL_CORE_FT
PRINCIPAL DISCHARGE DIAGNOSIS  Diagnosis: COVID-19  Assessment and Plan of Treatment: You presented to the emergency room from nursing home for 3 days of  worsening anxiety, chills, productive cough, shortness of breat, palpitations, and fatigue. We admitted to medicine for COVID-19 management as you tested postivive. No antibiotics or antivirals given because you were stable on room air. We are sending you back to nursing home because you are medically stable. Please follow up with your primary care physicial in 1 week.      SECONDARY DISCHARGE DIAGNOSES  Diagnosis: Protein C deficiency  Assessment and Plan of Treatment: For pt has hx of protein c deficiency and factor V leiden mutation with multiple PE and DVT in the past and hx of CVA in 2018, You were were continued on home Eliquis 5 mg twice a day. Follow up with your primary care physician in 1 week.    Diagnosis: H/O bipolar disorder  Assessment and Plan of Treatment: For H/O bipolar disorder you were continued on Depakote, Risperidone. Please follow up with your psychiatrist/therapist in 1 week    Diagnosis: Hyperlipidemia  Assessment and Plan of Treatment: You have hyperlipidemia. Please continue to take your cholesterol medications-Atorvastatin. Maintain a healthy diet that consist of low sugar, low fat, low sodium. Decrease the amount of fried foods that you consume. Exercise frequently if possible. Please follow up with  your primary care provider within 1 week of your discharge.  You are recommended a DASH Diet that emphasizes mostly vegetables, fruits, and fat-free or low-fat dairy products. Please limit intake of sodium, sweets, beverages w/ high sugar/carbohydrate content.      Diagnosis: Cerebrovascular accident (CVA)  Assessment and Plan of Treatment: For history of Cerebrovascular accident (CVA), you were continued on home statin. Follow up with your primary care physician in 1 week.    Diagnosis: Anxiety and depression  Assessment and Plan of Treatment: For Anxiety and depression you were continued on Clonidine, Robaxin, Flexeril, Benadryl, Lexapro, Seroquel, Risperidone. Follow up with your psychiatrist/therapist in 1 week.

## 2024-11-08 NOTE — PROGRESS NOTE ADULT - ASSESSMENT
Pt is a 40 y/o F, [place] nursing home resident, with PMHx of explosive disorder, mild intellectual disability, CVA (in 2018 on Eliquis), left-sided hemiaplasia, bipolar disorder, seizure disorder, hyperlipidemia, depression, asthma, protein C resistance,  factor V Leiden deficiency w history of DVT/PE, hypothyroidism, GERD who presents to the ED from nursing home for 3 days of  worsening anxiety, chills, productive cough, SOB, palpitations, and fatigue. Admitted to medicine for COVID-19 management. Patient unable to return to nursing home at this time due to COVID-19.

## 2024-11-08 NOTE — DISCHARGE NOTE PROVIDER - PROVIDER TOKENS
FREE:[LAST:[Gary],FIRST:[Fidel],PHONE:[phone]],FAX:[(   )    -],ADDRESS:[[address] [place] [address]],FOLLOWUP:[1 week]]

## 2024-11-08 NOTE — DISCHARGE NOTE NURSING/CASE MANAGEMENT/SOCIAL WORK - NSDCPEFALRISK_GEN_ALL_CORE
For information on Fall & Injury Prevention, visit: [email or web address] OR  [email or web address] OR  https://www.cdc.gov/steadi/patient.html

## 2024-11-08 NOTE — DISCHARGE NOTE PROVIDER - CARE PROVIDER_API CALL
Fidel Vega  [address] [facility] [address] [place] [address]  Phone: [phone]  Fax: (   )    -  Follow Up Time: 1 week

## 2024-11-08 NOTE — DISCHARGE NOTE NURSING/CASE MANAGEMENT/SOCIAL WORK - FINANCIAL ASSISTANCE
[facility] provides services at a reduced cost to those who are determined to be eligible through [facility]’s financial assistance program. Information regarding [facility]’s financial assistance program can be found by going to [email or web address] or by calling [phone].

## 2024-11-11 NOTE — H&P ADULT - ATTENDING COMMENTS
5 acute appendicits (retro-cecal)  admit, npo, iv antibiotics  holding t-A-C  anticipate OR 8/5 once t-A-C effect wears off

## 2024-11-13 ENCOUNTER — EMERGENCY (EMERGENCY)
Facility: HOSPITAL | Age: 39
LOS: 1 days | Discharge: HOME | End: 2024-11-13
Attending: EMERGENCY MEDICINE
Payer: MEDICARE

## 2024-11-13 VITALS
OXYGEN SATURATION: 99 % | HEIGHT: 71 IN | TEMPERATURE: 98 F | RESPIRATION RATE: 20 BRPM | SYSTOLIC BLOOD PRESSURE: 133 MMHG | HEART RATE: 125 BPM | DIASTOLIC BLOOD PRESSURE: 73 MMHG

## 2024-11-13 PROCEDURE — 99285 EMERGENCY DEPT VISIT HI MDM: CPT

## 2024-11-14 VITALS
SYSTOLIC BLOOD PRESSURE: 129 MMHG | DIASTOLIC BLOOD PRESSURE: 89 MMHG | HEART RATE: 94 BPM | OXYGEN SATURATION: 99 % | RESPIRATION RATE: 18 BRPM

## 2024-11-14 LAB
ALBUMIN SERPL ELPH-MCNC: 3.7 G/DL — SIGNIFICANT CHANGE UP (ref 3.5–5)
ALP SERPL-CCNC: 68 U/L — SIGNIFICANT CHANGE UP (ref 40–120)
ALT FLD-CCNC: 74 U/L DA — HIGH (ref 10–60)
ANION GAP SERPL CALC-SCNC: 5 MMOL/L — SIGNIFICANT CHANGE UP (ref 5–17)
APPEARANCE UR: CLEAR — SIGNIFICANT CHANGE UP
APTT BLD: 29.4 SEC — SIGNIFICANT CHANGE UP (ref 24.5–35.6)
AST SERPL-CCNC: 24 U/L — SIGNIFICANT CHANGE UP (ref 10–40)
BASOPHILS # BLD AUTO: 0.02 K/UL — SIGNIFICANT CHANGE UP (ref 0–0.2)
BASOPHILS NFR BLD AUTO: 0.4 % — SIGNIFICANT CHANGE UP (ref 0–2)
BILIRUB SERPL-MCNC: 0.3 MG/DL — SIGNIFICANT CHANGE UP (ref 0.2–1.2)
BILIRUB UR-MCNC: NEGATIVE — SIGNIFICANT CHANGE UP
BUN SERPL-MCNC: 12 MG/DL — SIGNIFICANT CHANGE UP (ref 7–18)
CALCIUM SERPL-MCNC: 9.7 MG/DL — SIGNIFICANT CHANGE UP (ref 8.4–10.5)
CHLORIDE SERPL-SCNC: 105 MMOL/L — SIGNIFICANT CHANGE UP (ref 96–108)
CK MB CFR SERPL CALC: <1 NG/ML — SIGNIFICANT CHANGE UP (ref 0–3.6)
CO2 SERPL-SCNC: 29 MMOL/L — SIGNIFICANT CHANGE UP (ref 22–31)
COLOR SPEC: YELLOW — SIGNIFICANT CHANGE UP
CREAT SERPL-MCNC: 0.78 MG/DL — SIGNIFICANT CHANGE UP (ref 0.5–1.3)
DIFF PNL FLD: NEGATIVE — SIGNIFICANT CHANGE UP
EGFR: 100 ML/MIN/1.73M2 — SIGNIFICANT CHANGE UP
EGFR: 100 ML/MIN/1.73M2 — SIGNIFICANT CHANGE UP
EOSINOPHIL # BLD AUTO: 0.02 K/UL — SIGNIFICANT CHANGE UP (ref 0–0.5)
EOSINOPHIL NFR BLD AUTO: 0.4 % — SIGNIFICANT CHANGE UP (ref 0–6)
FLUAV AG NPH QL: SIGNIFICANT CHANGE UP
FLUBV AG NPH QL: SIGNIFICANT CHANGE UP
GLUCOSE SERPL-MCNC: 102 MG/DL — HIGH (ref 70–99)
GLUCOSE UR QL: NEGATIVE MG/DL — SIGNIFICANT CHANGE UP
HCG SERPL-ACNC: <1 MIU/ML — SIGNIFICANT CHANGE UP
HCT VFR BLD CALC: 33.2 % — LOW (ref 34.5–45)
HGB BLD-MCNC: 11.1 G/DL — LOW (ref 11.5–15.5)
IMM GRANULOCYTES NFR BLD AUTO: 0.5 % — SIGNIFICANT CHANGE UP (ref 0–0.9)
INR BLD: 1.19 RATIO — HIGH (ref 0.85–1.16)
KETONES UR-MCNC: NEGATIVE MG/DL — SIGNIFICANT CHANGE UP
LACTATE SERPL-SCNC: 1.8 MMOL/L — SIGNIFICANT CHANGE UP (ref 0.7–2)
LACTATE SERPL-SCNC: 2.6 MMOL/L — HIGH (ref 0.7–2)
LACTATE SERPL-SCNC: 3 MMOL/L — HIGH (ref 0.7–2)
LEUKOCYTE ESTERASE UR-ACNC: NEGATIVE — SIGNIFICANT CHANGE UP
LYMPHOCYTES # BLD AUTO: 2.25 K/UL — SIGNIFICANT CHANGE UP (ref 1–3.3)
LYMPHOCYTES # BLD AUTO: 40 % — SIGNIFICANT CHANGE UP (ref 13–44)
MAGNESIUM SERPL-MCNC: 1.6 MG/DL — SIGNIFICANT CHANGE UP (ref 1.6–2.6)
MCHC RBC-ENTMCNC: 31.9 PG — SIGNIFICANT CHANGE UP (ref 27–34)
MCHC RBC-ENTMCNC: 33.4 G/DL — SIGNIFICANT CHANGE UP (ref 32–36)
MCV RBC AUTO: 95.4 FL — SIGNIFICANT CHANGE UP (ref 80–100)
MONOCYTES # BLD AUTO: 0.53 K/UL — SIGNIFICANT CHANGE UP (ref 0–0.9)
MONOCYTES NFR BLD AUTO: 9.4 % — SIGNIFICANT CHANGE UP (ref 2–14)
NEUTROPHILS # BLD AUTO: 2.78 K/UL — SIGNIFICANT CHANGE UP (ref 1.8–7.4)
NEUTROPHILS NFR BLD AUTO: 49.3 % — SIGNIFICANT CHANGE UP (ref 43–77)
NITRITE UR-MCNC: NEGATIVE — SIGNIFICANT CHANGE UP
NRBC # BLD: 0 /100 WBCS — SIGNIFICANT CHANGE UP (ref 0–0)
NRBC BLD-RTO: 0 /100 WBCS — SIGNIFICANT CHANGE UP (ref 0–0)
NT-PROBNP SERPL-SCNC: 73 PG/ML — SIGNIFICANT CHANGE UP (ref 0–125)
PH UR: 6.5 — SIGNIFICANT CHANGE UP (ref 5–8)
PLATELET # BLD AUTO: 146 K/UL — LOW (ref 150–400)
POTASSIUM SERPL-MCNC: 4.3 MMOL/L — SIGNIFICANT CHANGE UP (ref 3.5–5.3)
POTASSIUM SERPL-SCNC: 4.3 MMOL/L — SIGNIFICANT CHANGE UP (ref 3.5–5.3)
PROT SERPL-MCNC: 7.5 G/DL — SIGNIFICANT CHANGE UP (ref 6–8.3)
PROT UR-MCNC: NEGATIVE MG/DL — SIGNIFICANT CHANGE UP
PROTHROM AB SERPL-ACNC: 13.8 SEC — HIGH (ref 9.9–13.4)
RBC # BLD: 3.48 M/UL — LOW (ref 3.8–5.2)
RBC # FLD: 14.9 % — HIGH (ref 10.3–14.5)
RSV RNA NPH QL NAA+NON-PROBE: SIGNIFICANT CHANGE UP
SARS-COV-2 RNA SPEC QL NAA+PROBE: DETECTED
SODIUM SERPL-SCNC: 139 MMOL/L — SIGNIFICANT CHANGE UP (ref 135–145)
SP GR SPEC: 1.02 — SIGNIFICANT CHANGE UP (ref 1–1.03)
TROPONIN I, HIGH SENSITIVITY RESULT: 3.4 NG/L — SIGNIFICANT CHANGE UP
UROBILINOGEN FLD QL: 0.2 MG/DL — SIGNIFICANT CHANGE UP (ref 0.2–1)
WBC # BLD: 5.63 K/UL — SIGNIFICANT CHANGE UP (ref 3.8–10.5)
WBC # FLD AUTO: 5.63 K/UL — SIGNIFICANT CHANGE UP (ref 3.8–10.5)

## 2024-11-14 PROCEDURE — 71045 X-RAY EXAM CHEST 1 VIEW: CPT

## 2024-11-14 PROCEDURE — 85730 THROMBOPLASTIN TIME PARTIAL: CPT

## 2024-11-14 PROCEDURE — 84100 ASSAY OF PHOSPHORUS: CPT

## 2024-11-14 PROCEDURE — 71275 CT ANGIOGRAPHY CHEST: CPT | Mod: MC

## 2024-11-14 PROCEDURE — 81003 URINALYSIS AUTO W/O SCOPE: CPT

## 2024-11-14 PROCEDURE — 83880 ASSAY OF NATRIURETIC PEPTIDE: CPT

## 2024-11-14 PROCEDURE — 36415 COLL VENOUS BLD VENIPUNCTURE: CPT

## 2024-11-14 PROCEDURE — 93971 EXTREMITY STUDY: CPT | Mod: 26,RT

## 2024-11-14 PROCEDURE — 83735 ASSAY OF MAGNESIUM: CPT

## 2024-11-14 PROCEDURE — 87040 BLOOD CULTURE FOR BACTERIA: CPT

## 2024-11-14 PROCEDURE — 85610 PROTHROMBIN TIME: CPT

## 2024-11-14 PROCEDURE — 85025 COMPLETE CBC W/AUTO DIFF WBC: CPT

## 2024-11-14 PROCEDURE — 93971 EXTREMITY STUDY: CPT

## 2024-11-14 PROCEDURE — 80053 COMPREHEN METABOLIC PANEL: CPT

## 2024-11-14 PROCEDURE — 84702 CHORIONIC GONADOTROPIN TEST: CPT

## 2024-11-14 PROCEDURE — 96374 THER/PROPH/DIAG INJ IV PUSH: CPT

## 2024-11-14 PROCEDURE — 71275 CT ANGIOGRAPHY CHEST: CPT | Mod: 26,MC

## 2024-11-14 PROCEDURE — 82553 CREATINE MB FRACTION: CPT

## 2024-11-14 PROCEDURE — 99285 EMERGENCY DEPT VISIT HI MDM: CPT | Mod: 25

## 2024-11-14 PROCEDURE — 84484 ASSAY OF TROPONIN QUANT: CPT

## 2024-11-14 PROCEDURE — 87637 SARSCOV2&INF A&B&RSV AMP PRB: CPT

## 2024-11-14 PROCEDURE — 71045 X-RAY EXAM CHEST 1 VIEW: CPT | Mod: 26

## 2024-11-14 PROCEDURE — 93005 ELECTROCARDIOGRAM TRACING: CPT

## 2024-11-14 PROCEDURE — 83605 ASSAY OF LACTIC ACID: CPT

## 2024-11-14 RX ORDER — CEFTRIAXONE 500 MG/1
1000 INJECTION, POWDER, FOR SOLUTION INTRAMUSCULAR; INTRAVENOUS ONCE
Refills: 0 | Status: COMPLETED | OUTPATIENT
Start: 2024-11-14 | End: 2024-11-14

## 2024-11-14 RX ORDER — SODIUM CHLORIDE 9 G/1000ML
1000 INJECTION, SOLUTION INTRAVENOUS ONCE
Refills: 0 | Status: COMPLETED | OUTPATIENT
Start: 2024-11-14 | End: 2024-11-14

## 2024-11-14 RX ORDER — METHOCARBAMOL 500 MG/1
750 TABLET, FILM COATED ORAL ONCE
Refills: 0 | Status: COMPLETED | OUTPATIENT
Start: 2024-11-14 | End: 2024-11-14

## 2024-11-14 RX ORDER — DIPHENHYDRAMINE HCL 12.5MG/5ML
25 ELIXIR ORAL ONCE
Refills: 0 | Status: COMPLETED | OUTPATIENT
Start: 2024-11-14 | End: 2024-11-14

## 2024-11-14 RX ORDER — DIPHENHYDRAMINE HCL 12.5MG/5ML
25 ELIXIR ORAL ONCE
Refills: 0 | Status: DISCONTINUED | OUTPATIENT
Start: 2024-11-14 | End: 2024-11-14

## 2024-11-14 RX ORDER — DIPHENHYDRAMINE HCL 12.5MG/5ML
50 ELIXIR ORAL ONCE
Refills: 0 | Status: COMPLETED | OUTPATIENT
Start: 2024-11-14 | End: 2024-11-14

## 2024-11-14 RX ADMIN — METHOCARBAMOL 750 MILLIGRAM(S): 500 TABLET, FILM COATED ORAL at 12:26

## 2024-11-14 RX ADMIN — SODIUM CHLORIDE 1000 MILLILITER(S): 9 INJECTION, SOLUTION INTRAVENOUS at 07:28

## 2024-11-14 RX ADMIN — CEFTRIAXONE 100 MILLIGRAM(S): 500 INJECTION, POWDER, FOR SOLUTION INTRAMUSCULAR; INTRAVENOUS at 06:42

## 2024-11-14 RX ADMIN — METHOCARBAMOL 750 MILLIGRAM(S): 500 TABLET, FILM COATED ORAL at 02:01

## 2024-11-14 RX ADMIN — Medication 25 MILLIGRAM(S): at 02:10

## 2024-11-14 RX ADMIN — Medication 25 MILLIGRAM(S): at 02:01

## 2024-11-14 RX ADMIN — SODIUM CHLORIDE 1000 MILLILITER(S): 9 INJECTION, SOLUTION INTRAVENOUS at 01:35

## 2024-11-14 RX ADMIN — Medication 50 MILLIGRAM(S): at 12:26

## 2024-11-14 RX ADMIN — SODIUM CHLORIDE 1000 MILLILITER(S): 9 INJECTION, SOLUTION INTRAVENOUS at 01:36

## 2024-11-14 RX ADMIN — SODIUM CHLORIDE 1000 MILLILITER(S): 9 INJECTION, SOLUTION INTRAVENOUS at 06:43

## 2024-11-14 RX ADMIN — SODIUM CHLORIDE 1000 MILLILITER(S): 9 INJECTION, SOLUTION INTRAVENOUS at 10:55

## 2024-11-19 ENCOUNTER — INPATIENT (INPATIENT)
Facility: HOSPITAL | Age: 39
LOS: 0 days | Discharge: NURSING, REHAB OR LONG-TERM CARE | DRG: 312 | End: 2024-11-20
Attending: HOSPITALIST | Admitting: HOSPITALIST
Payer: MEDICARE

## 2024-11-19 ENCOUNTER — TRANSCRIPTION ENCOUNTER (OUTPATIENT)
Age: 39
End: 2024-11-19

## 2024-11-19 VITALS
OXYGEN SATURATION: 97 % | HEIGHT: 71 IN | DIASTOLIC BLOOD PRESSURE: 81 MMHG | SYSTOLIC BLOOD PRESSURE: 113 MMHG | RESPIRATION RATE: 19 BRPM | WEIGHT: 293 LBS | TEMPERATURE: 98 F | HEART RATE: 98 BPM

## 2024-11-19 DIAGNOSIS — D68.51 ACTIVATED PROTEIN C RESISTANCE: ICD-10-CM

## 2024-11-19 DIAGNOSIS — Z90.49 ACQUIRED ABSENCE OF OTHER SPECIFIED PARTS OF DIGESTIVE TRACT: Chronic | ICD-10-CM

## 2024-11-19 DIAGNOSIS — R56.9 UNSPECIFIED CONVULSIONS: ICD-10-CM

## 2024-11-19 DIAGNOSIS — Z86.73 PERSONAL HISTORY OF TRANSIENT ISCHEMIC ATTACK (TIA), AND CEREBRAL INFARCTION WITHOUT RESIDUAL DEFICITS: ICD-10-CM

## 2024-11-19 DIAGNOSIS — I10 ESSENTIAL (PRIMARY) HYPERTENSION: ICD-10-CM

## 2024-11-19 DIAGNOSIS — R74.01 ELEVATION OF LEVELS OF LIVER TRANSAMINASE LEVELS: ICD-10-CM

## 2024-11-19 DIAGNOSIS — Z29.9 ENCOUNTER FOR PROPHYLACTIC MEASURES, UNSPECIFIED: ICD-10-CM

## 2024-11-19 DIAGNOSIS — E11.9 TYPE 2 DIABETES MELLITUS WITHOUT COMPLICATIONS: ICD-10-CM

## 2024-11-19 DIAGNOSIS — R55 SYNCOPE AND COLLAPSE: ICD-10-CM

## 2024-11-19 DIAGNOSIS — E03.9 HYPOTHYROIDISM, UNSPECIFIED: ICD-10-CM

## 2024-11-19 DIAGNOSIS — G43.909 MIGRAINE, UNSPECIFIED, NOT INTRACTABLE, WITHOUT STATUS MIGRAINOSUS: ICD-10-CM

## 2024-11-19 DIAGNOSIS — F31.9 BIPOLAR DISORDER, UNSPECIFIED: ICD-10-CM

## 2024-11-19 LAB
ALBUMIN SERPL ELPH-MCNC: 3.8 G/DL — SIGNIFICANT CHANGE UP (ref 3.5–5)
ALP SERPL-CCNC: 71 U/L — SIGNIFICANT CHANGE UP (ref 40–120)
ALT FLD-CCNC: 103 U/L DA — HIGH (ref 10–60)
ANION GAP SERPL CALC-SCNC: 8 MMOL/L — SIGNIFICANT CHANGE UP (ref 5–17)
APPEARANCE UR: ABNORMAL
AST SERPL-CCNC: 47 U/L — HIGH (ref 10–40)
BACTERIA # UR AUTO: NEGATIVE /HPF — SIGNIFICANT CHANGE UP
BASOPHILS # BLD AUTO: 0.01 K/UL — SIGNIFICANT CHANGE UP (ref 0–0.2)
BASOPHILS NFR BLD AUTO: 0.2 % — SIGNIFICANT CHANGE UP (ref 0–2)
BILIRUB SERPL-MCNC: 0.3 MG/DL — SIGNIFICANT CHANGE UP (ref 0.2–1.2)
BILIRUB UR-MCNC: ABNORMAL
BUN SERPL-MCNC: 10 MG/DL — SIGNIFICANT CHANGE UP (ref 7–18)
CALCIUM SERPL-MCNC: 9.2 MG/DL — SIGNIFICANT CHANGE UP (ref 8.4–10.5)
CHLORIDE SERPL-SCNC: 107 MMOL/L — SIGNIFICANT CHANGE UP (ref 96–108)
CO2 SERPL-SCNC: 25 MMOL/L — SIGNIFICANT CHANGE UP (ref 22–31)
COLOR SPEC: SIGNIFICANT CHANGE UP
CREAT SERPL-MCNC: 0.75 MG/DL — SIGNIFICANT CHANGE UP (ref 0.5–1.3)
CULTURE RESULTS: SIGNIFICANT CHANGE UP
CULTURE RESULTS: SIGNIFICANT CHANGE UP
DIFF PNL FLD: NEGATIVE — SIGNIFICANT CHANGE UP
EGFR: 105 ML/MIN/1.73M2 — SIGNIFICANT CHANGE UP
EOSINOPHIL # BLD AUTO: 0.03 K/UL — SIGNIFICANT CHANGE UP (ref 0–0.5)
EOSINOPHIL NFR BLD AUTO: 0.5 % — SIGNIFICANT CHANGE UP (ref 0–6)
EPI CELLS # UR: PRESENT
GLUCOSE SERPL-MCNC: 91 MG/DL — SIGNIFICANT CHANGE UP (ref 70–99)
GLUCOSE UR QL: NEGATIVE MG/DL — SIGNIFICANT CHANGE UP
HCT VFR BLD CALC: 33.7 % — LOW (ref 34.5–45)
HGB BLD-MCNC: 11.2 G/DL — LOW (ref 11.5–15.5)
IMM GRANULOCYTES NFR BLD AUTO: 0.4 % — SIGNIFICANT CHANGE UP (ref 0–0.9)
KETONES UR-MCNC: 15 MG/DL
LACTATE SERPL-SCNC: 1.5 MMOL/L — SIGNIFICANT CHANGE UP (ref 0.7–2)
LEUKOCYTE ESTERASE UR-ACNC: ABNORMAL
LYMPHOCYTES # BLD AUTO: 2.15 K/UL — SIGNIFICANT CHANGE UP (ref 1–3.3)
LYMPHOCYTES # BLD AUTO: 37.8 % — SIGNIFICANT CHANGE UP (ref 13–44)
MAGNESIUM SERPL-MCNC: 1.7 MG/DL — SIGNIFICANT CHANGE UP (ref 1.6–2.6)
MCHC RBC-ENTMCNC: 31.4 PG — SIGNIFICANT CHANGE UP (ref 27–34)
MCHC RBC-ENTMCNC: 33.2 G/DL — SIGNIFICANT CHANGE UP (ref 32–36)
MCV RBC AUTO: 94.4 FL — SIGNIFICANT CHANGE UP (ref 80–100)
MONOCYTES # BLD AUTO: 0.54 K/UL — SIGNIFICANT CHANGE UP (ref 0–0.9)
MONOCYTES NFR BLD AUTO: 9.5 % — SIGNIFICANT CHANGE UP (ref 2–14)
NEUTROPHILS # BLD AUTO: 2.94 K/UL — SIGNIFICANT CHANGE UP (ref 1.8–7.4)
NEUTROPHILS NFR BLD AUTO: 51.6 % — SIGNIFICANT CHANGE UP (ref 43–77)
NITRITE UR-MCNC: NEGATIVE — SIGNIFICANT CHANGE UP
NRBC # BLD: 0 /100 WBCS — SIGNIFICANT CHANGE UP (ref 0–0)
PH UR: 5.5 — SIGNIFICANT CHANGE UP (ref 5–8)
PHOSPHATE SERPL-MCNC: 3.1 MG/DL — SIGNIFICANT CHANGE UP (ref 2.5–4.5)
PLATELET # BLD AUTO: 129 K/UL — LOW (ref 150–400)
POTASSIUM SERPL-MCNC: 4.1 MMOL/L — SIGNIFICANT CHANGE UP (ref 3.5–5.3)
POTASSIUM SERPL-SCNC: 4.1 MMOL/L — SIGNIFICANT CHANGE UP (ref 3.5–5.3)
PROT SERPL-MCNC: 7.3 G/DL — SIGNIFICANT CHANGE UP (ref 6–8.3)
PROT UR-MCNC: 30 MG/DL
RBC # BLD: 3.57 M/UL — LOW (ref 3.8–5.2)
RBC # FLD: 14.9 % — HIGH (ref 10.3–14.5)
RBC CASTS # UR COMP ASSIST: 0 /HPF — SIGNIFICANT CHANGE UP (ref 0–4)
SODIUM SERPL-SCNC: 140 MMOL/L — SIGNIFICANT CHANGE UP (ref 135–145)
SP GR SPEC: 1.04 — HIGH (ref 1–1.03)
SPECIMEN SOURCE: SIGNIFICANT CHANGE UP
SPECIMEN SOURCE: SIGNIFICANT CHANGE UP
TROPONIN I, HIGH SENSITIVITY RESULT: 3.2 NG/L — SIGNIFICANT CHANGE UP
TROPONIN I, HIGH SENSITIVITY RESULT: 4 NG/L — SIGNIFICANT CHANGE UP
UROBILINOGEN FLD QL: 1 MG/DL — SIGNIFICANT CHANGE UP (ref 0.2–1)
WBC # BLD: 5.69 K/UL — SIGNIFICANT CHANGE UP (ref 3.8–10.5)
WBC # FLD AUTO: 5.69 K/UL — SIGNIFICANT CHANGE UP (ref 3.8–10.5)
WBC UR QL: 1 /HPF — SIGNIFICANT CHANGE UP (ref 0–5)

## 2024-11-19 PROCEDURE — 99285 EMERGENCY DEPT VISIT HI MDM: CPT

## 2024-11-19 PROCEDURE — 70450 CT HEAD/BRAIN W/O DYE: CPT | Mod: 26,MC

## 2024-11-19 PROCEDURE — 99223 1ST HOSP IP/OBS HIGH 75: CPT | Mod: GC,AI

## 2024-11-19 RX ORDER — METHOCARBAMOL 500 MG/1
500 TABLET, FILM COATED ORAL DAILY
Refills: 0 | Status: DISCONTINUED | OUTPATIENT
Start: 2024-11-19 | End: 2024-11-19

## 2024-11-19 RX ORDER — ACETAMINOPHEN 500MG 500 MG/1
650 TABLET, COATED ORAL EVERY 6 HOURS
Refills: 0 | Status: DISCONTINUED | OUTPATIENT
Start: 2024-11-19 | End: 2024-11-20

## 2024-11-19 RX ORDER — LEVOTHYROXINE SODIUM 150 MCG
100 TABLET ORAL DAILY
Refills: 0 | Status: DISCONTINUED | OUTPATIENT
Start: 2024-11-19 | End: 2024-11-20

## 2024-11-19 RX ORDER — CYCLOBENZAPRINE HCL 10 MG
1 TABLET ORAL
Refills: 0 | DISCHARGE

## 2024-11-19 RX ORDER — ALPRAZOLAM 0.5 MG
0.25 TABLET ORAL DAILY
Refills: 0 | Status: DISCONTINUED | OUTPATIENT
Start: 2024-11-19 | End: 2024-11-20

## 2024-11-19 RX ORDER — RISPERIDONE 4 MG/1
1 TABLET ORAL AT BEDTIME
Refills: 0 | Status: DISCONTINUED | OUTPATIENT
Start: 2024-11-19 | End: 2024-11-19

## 2024-11-19 RX ORDER — DIPHENHYDRAMINE HCL 25 MG
50 CAPSULE ORAL ONCE
Refills: 0 | Status: COMPLETED | OUTPATIENT
Start: 2024-11-19 | End: 2024-11-19

## 2024-11-19 RX ORDER — ALPRAZOLAM 0.5 MG
0.25 TABLET ORAL ONCE
Refills: 0 | Status: DISCONTINUED | OUTPATIENT
Start: 2024-11-19 | End: 2024-11-19

## 2024-11-19 RX ORDER — CLONIDINE HYDROCHLORIDE 0.3 MG/1
0.1 TABLET ORAL AT BEDTIME
Refills: 0 | Status: DISCONTINUED | OUTPATIENT
Start: 2024-11-19 | End: 2024-11-20

## 2024-11-19 RX ORDER — SUMATRIPTAN SUCCINATE 100 MG/1
1 TABLET, FILM COATED ORAL
Refills: 0 | DISCHARGE

## 2024-11-19 RX ORDER — 0.9 % SODIUM CHLORIDE 0.9 %
1000 INTRAVENOUS SOLUTION INTRAVENOUS ONCE
Refills: 0 | Status: COMPLETED | OUTPATIENT
Start: 2024-11-19 | End: 2024-11-19

## 2024-11-19 RX ORDER — ESCITALOPRAM OXALATE 10 MG/1
10 TABLET, FILM COATED ORAL DAILY
Refills: 0 | Status: DISCONTINUED | OUTPATIENT
Start: 2024-11-19 | End: 2024-11-20

## 2024-11-19 RX ORDER — RISPERIDONE 4 MG/1
3 TABLET ORAL AT BEDTIME
Refills: 0 | Status: DISCONTINUED | OUTPATIENT
Start: 2024-11-19 | End: 2024-11-20

## 2024-11-19 RX ORDER — APIXABAN 2.5 MG/1
5 TABLET, FILM COATED ORAL EVERY 12 HOURS
Refills: 0 | Status: DISCONTINUED | OUTPATIENT
Start: 2024-11-19 | End: 2024-11-20

## 2024-11-19 RX ORDER — ALPRAZOLAM 0.5 MG
1 TABLET ORAL
Refills: 0 | DISCHARGE

## 2024-11-19 RX ORDER — METHOCARBAMOL 500 MG/1
750 TABLET, FILM COATED ORAL ONCE
Refills: 0 | Status: COMPLETED | OUTPATIENT
Start: 2024-11-19 | End: 2024-11-19

## 2024-11-19 RX ORDER — IPRATROPIUM BROMIDE AND ALBUTEROL SULFATE 2.5; .5 MG/3ML; MG/3ML
3 SOLUTION RESPIRATORY (INHALATION)
Refills: 0 | DISCHARGE

## 2024-11-19 RX ORDER — MAGNESIUM, ALUMINUM HYDROXIDE 200-225/5
30 SUSPENSION, ORAL (FINAL DOSE FORM) ORAL EVERY 4 HOURS
Refills: 0 | Status: DISCONTINUED | OUTPATIENT
Start: 2024-11-19 | End: 2024-11-20

## 2024-11-19 RX ORDER — SUCRALFATE 1 G/1
1 TABLET ORAL
Refills: 0 | Status: DISCONTINUED | OUTPATIENT
Start: 2024-11-19 | End: 2024-11-20

## 2024-11-19 RX ORDER — METHOCARBAMOL 500 MG/1
1000 TABLET, FILM COATED ORAL DAILY
Refills: 0 | Status: DISCONTINUED | OUTPATIENT
Start: 2024-11-19 | End: 2024-11-20

## 2024-11-19 RX ORDER — DIVALPROEX SODIUM 500 MG
3 TABLET, DELAYED RELEASE (ENTERIC COATED) ORAL
Refills: 0 | DISCHARGE

## 2024-11-19 RX ORDER — INFLUENZA VIRUS VACCINE 15; 15; 15; 15 UG/.5ML; UG/.5ML; UG/.5ML; UG/.5ML
0.5 SUSPENSION INTRAMUSCULAR ONCE
Refills: 0 | Status: DISCONTINUED | OUTPATIENT
Start: 2024-11-19 | End: 2024-11-20

## 2024-11-19 RX ORDER — DIVALPROEX SODIUM 500 MG
750 TABLET, DELAYED RELEASE (ENTERIC COATED) ORAL
Refills: 0 | Status: DISCONTINUED | OUTPATIENT
Start: 2024-11-19 | End: 2024-11-20

## 2024-11-19 RX ORDER — ACETAMINOPHEN, DIPHENHYDRAMINE HCL, PHENYLEPHRINE HCL 325; 25; 5 MG/1; MG/1; MG/1
3 TABLET ORAL AT BEDTIME
Refills: 0 | Status: DISCONTINUED | OUTPATIENT
Start: 2024-11-19 | End: 2024-11-20

## 2024-11-19 RX ORDER — SUMATRIPTAN SUCCINATE 100 MG/1
100 TABLET, FILM COATED ORAL
Refills: 0 | Status: DISCONTINUED | OUTPATIENT
Start: 2024-11-19 | End: 2024-11-20

## 2024-11-19 RX ORDER — ESCITALOPRAM OXALATE 10 MG/1
1 TABLET, FILM COATED ORAL
Refills: 0 | DISCHARGE

## 2024-11-19 RX ORDER — NALTREXONE HYDROCHLORIDE 50 MG/1
1 TABLET, FILM COATED ORAL
Refills: 0 | DISCHARGE

## 2024-11-19 RX ORDER — NALTREXONE HYDROCHLORIDE 50 MG/1
50 TABLET, FILM COATED ORAL DAILY
Refills: 0 | Status: DISCONTINUED | OUTPATIENT
Start: 2024-11-19 | End: 2024-11-20

## 2024-11-19 RX ADMIN — METHOCARBAMOL 750 MILLIGRAM(S): 500 TABLET, FILM COATED ORAL at 06:28

## 2024-11-19 RX ADMIN — Medication 1000 MILLILITER(S): at 03:55

## 2024-11-19 RX ADMIN — Medication 50 MILLIGRAM(S): at 06:31

## 2024-11-19 RX ADMIN — Medication 0.25 MILLIGRAM(S): at 11:14

## 2024-11-19 RX ADMIN — METHOCARBAMOL 1000 MILLIGRAM(S): 500 TABLET, FILM COATED ORAL at 11:14

## 2024-11-19 RX ADMIN — ACETAMINOPHEN, DIPHENHYDRAMINE HCL, PHENYLEPHRINE HCL 3 MILLIGRAM(S): 325; 25; 5 TABLET ORAL at 22:42

## 2024-11-19 RX ADMIN — Medication 50 MILLIGRAM(S): at 22:42

## 2024-11-19 RX ADMIN — NALTREXONE HYDROCHLORIDE 50 MILLIGRAM(S): 50 TABLET, FILM COATED ORAL at 12:17

## 2024-11-19 RX ADMIN — Medication 1000 MILLILITER(S): at 03:00

## 2024-11-19 RX ADMIN — Medication 0.25 MILLIGRAM(S): at 23:51

## 2024-11-19 RX ADMIN — Medication 200 MILLIGRAM(S): at 22:41

## 2024-11-19 RX ADMIN — Medication 25 MILLIGRAM(S): at 10:02

## 2024-11-19 RX ADMIN — Medication 50 MILLIGRAM(S): at 10:02

## 2024-11-19 RX ADMIN — APIXABAN 5 MILLIGRAM(S): 2.5 TABLET, FILM COATED ORAL at 22:42

## 2024-11-19 RX ADMIN — ESCITALOPRAM OXALATE 10 MILLIGRAM(S): 10 TABLET, FILM COATED ORAL at 12:17

## 2024-11-19 RX ADMIN — APIXABAN 5 MILLIGRAM(S): 2.5 TABLET, FILM COATED ORAL at 10:02

## 2024-11-19 RX ADMIN — Medication 80 MILLIGRAM(S): at 22:41

## 2024-11-19 RX ADMIN — CLONIDINE HYDROCHLORIDE 0.1 MILLIGRAM(S): 0.3 TABLET ORAL at 22:42

## 2024-11-19 RX ADMIN — SUCRALFATE 1 GRAM(S): 1 TABLET ORAL at 22:42

## 2024-11-19 RX ADMIN — Medication 750 MILLIGRAM(S): at 22:41

## 2024-11-19 RX ADMIN — RISPERIDONE 3 MILLIGRAM(S): 4 TABLET ORAL at 22:42

## 2024-11-19 NOTE — ED PROVIDER NOTE - NS ED MD DISPO SPECIAL CONSIDERATION1
None Complex Repair And V-Y Plasty Text: The defect edges were debeveled with a #15 scalpel blade.  The primary defect was closed partially with a complex linear closure.  Given the location of the remaining defect, shape of the defect and the proximity to free margins a V-Y plasty was deemed most appropriate for complete closure of the defect.  Using a sterile surgical marker, an appropriate advancement flap was drawn incorporating the defect and placing the expected incisions within the relaxed skin tension lines where possible.    The area thus outlined was incised deep to adipose tissue with a #15 scalpel blade.  The skin margins were undermined to an appropriate distance in all directions utilizing iris scissors.

## 2024-11-19 NOTE — ED PROVIDER NOTE - NS ED SCRIBE STATEMENT
[FreeTextEntry1] : Patient is up-to-date with all her screenings.  She will be receiving the flu shot today.  She will look into her prior PCP for her Tdap.  1-anxiety/Bipolar disorder/ADHD-patient follows diligently with her psychiatrist Dr. Martin Bauer, she states that she is well-controlled on her medications-she takes Vyvanse in the morning and later in the afternoon takes 1 or 2 Adderall depending on the day. She is also on Lamictal and Klonopin  2-sensation of skin crawling/itching-could be part of her anxiety or possibly medication related?  However patient has had dose feelings even before taking all these medication for many years.  She is insisting on seeing a neurologist, as she also has Tourette's syndrome.  3-right ankle sprain-patient is  over the lateral malleolus and over the lateral aspect of the foot, minimal if any swelling observed.  Patient range of motion at the ankle is a bit limited, especially inversion. Will send patient to be seen by Ortho she may need an MRI.  4-right ear tinnitus-ear exam does not reveal much except for slightly dull tympanic membranes.  Will refer to ENT. Attending

## 2024-11-19 NOTE — H&P ADULT - NSHPPHYSICALEXAM_GEN_ALL_CORE
Vital Signs Last 24 Hrs  T(C): 36.8 (19 Nov 2024 08:01), Max: 36.9 (19 Nov 2024 00:42)  T(F): 98.3 (19 Nov 2024 08:01), Max: 98.4 (19 Nov 2024 00:42)  HR: 98 (19 Nov 2024 08:01) (98 - 104)  BP: 124/86 (19 Nov 2024 08:01) (113/81 - 124/86)  BP(mean): --  RR: 18 (19 Nov 2024 08:01) (18 - 19)  SpO2: 98% (19 Nov 2024 08:01) (97% - 98%)    Parameters below as of 19 Nov 2024 08:01  Patient On (Oxygen Delivery Method): room air    GENERAL: NAD, sitting comfortably in bed   HEAD:  Atraumatic, Normocephalic  EYES: EOMI, PERRLA, conjunctiva and sclera clear  ENMT: No tonsillar erythema, exudates, or enlargement; Moist mucous membranes, No lesions  NECK: Supple, normal appearance, No JVD; Normal thyroid  NERVOUS SYSTEM:  Alert & Oriented X3,  Motor Strength 4/5 B/L upper and 3/5 lower extremities, sensation intact, CN II-XII intact.   CHEST/LUNG: Lungs clear to auscultation bilaterally, No rales, rhonchi, wheezing   HEART: Regular rate and rhythm; No murmurs, rubs, or gallops  ABDOMEN: +epigastric tenderness, no guarding; Soft, Nondistended; Bowel sounds present  : No suprapubic tenderness, CVAT;  EXTREMITIES:  2+ Peripheral Pulses, No clubbing, cyanosis, or edema. Left calf tenderness  LYMPH: No lymphadenopathy noted  SKIN: No rashes or lesions;  Good capillary refill

## 2024-11-19 NOTE — ED ADULT NURSE NOTE - OBJECTIVE STATEMENT
Patient is a 40y/o female presenting to the ED c/o sent from [facility] as per ems pt called 911,for nausea,abdoinal pain .limbs swelling feeling faint and passed out tonight

## 2024-11-19 NOTE — ED ADULT NURSE NOTE - CHIEF COMPLAINT QUOTE
[facility] [place] [facility] pt called 911,for nausea,abdoinal pain .limbs swelling feeling faint and passed out tonight

## 2024-11-19 NOTE — H&P ADULT - ASSESSMENT
Patient is a 39F, from  [place] ambulates with a walker, with PMHx of Protein C resistance, Factor V Leiden deficiency (on eliquis) c/b DVT/PE (2016), CVA (2019) w/ residual left sided weakness, seizure (on Depakote), hypothyroidism, HTN, T2DM, schizoaffective disorder, bipolar, borderline personality disorder, migraine, ?diverticulosis, GERD who presents to the hospital after a syncopal event last night. CK neg. Trop neg. EKG NSR with no ST changes. Admitted for syncope workup.

## 2024-11-19 NOTE — H&P ADULT - PROBLEM SELECTOR PLAN 3
Protein C resistance, Factor V Leiden deficiency on eliquis 5mg bid   c/b DVT/PE (2016), CVA (2019)   c/w home meds

## 2024-11-19 NOTE — DISCHARGE NOTE PROVIDER - NSDCCPCAREPLAN_GEN_ALL_CORE_FT
PRINCIPAL DISCHARGE DIAGNOSIS  Diagnosis: Syncope  Assessment and Plan of Treatment: You came to the hospital after an episode of syncope. You were admitted to exclude any neurological or cardiological causes. CTH was negative for any acute pathologies. Your CK and lactate was within normal limits. EKG showed normal rhythm without any ischemic changes. Troponin, a marker of cardiac stress, was negative. Telemetry while you were in the ED did not show any arrhythmia. No electolyte imabalance was observed. You were treated with IV fluids. Your orthosatic blood pressure was normal, meaning your blood pressure responded appropriately to position changes. You likely had vasovagal syncope in the setting of recent COVID infection. You are recommended to increase your fluids intake and maintain adequate hydration and do not rise from a seated position too quickly, as this could cause your blood pressure to drop (Sit for 2-3 minutes before standing or walking from lying down position). You are recommended to follow up with your PCP in 1 week from discharge for further recommendations.        SECONDARY DISCHARGE DIAGNOSES  Diagnosis: History of CVA in adulthood  Assessment and Plan of Treatment: You have history of a stroke with residual left sided weakness. CT of your head was negative for any acute pathologies. Your physical examination did not reveal any new deficits. Please continue to take your atorvastatin 80mg as prescribed.    Diagnosis: Factor V deficiency  Assessment and Plan of Treatment: You have a history of factor V deficiency and Protein C resistance. Due to the disorder you sustained DVTs, pulmonary embolism and clot in the past. Please continue to take your ELIQUIS 5mg TWICE DAILY as prescribed and follow up with your primary care doctor upon discharge for further recommendations.    Diagnosis: Seizure  Assessment and Plan of Treatment: You have a history of seizures which is a burst of uncontrolled electrical activity between brain cells causing temporary abnormalities in muscle tone or movements, behavior, sensation or state of awareness. Your CT head was negative for acute pathology. Please conitnue to take your Depakote as prescribed and follow up with neurologist upon discharge for further recommendations.    Diagnosis: Transaminitis  Assessment and Plan of Treatment: You were found to have mildly elevated liver function test (AST/ALT). Your prior labs show mildly elevated liver function test, likely in the setting of multiple medication use. Please repeat your liver function test in a week upon discharge and follow up with your primary care doctor for further management.    Diagnosis: Hypothyroidism  Assessment and Plan of Treatment: You have history of Hypothyroidism which means you do not make enough thyroid hormone. You were continued on your home medication levothyroxine. Please continue to take your home medications and remember it needs to be taken first thing in the morning, ON AN EMPTY STOMACH. Do NOT take with any other medications and wait at least 30 minutes to eat.    Diagnosis: Type 2 diabetes mellitus  Assessment and Plan of Treatment: You have a history of diabetes. You need to continue monitoring your blood sugar levels closely and maintain healthy lifestyle by eating healthy diabetic regimen, and stay active as tolerated. Please continue to take your metformin as prescribed.  Please follow up with your primary care provider for further management.    Diagnosis: HTN (hypertension)  Assessment and Plan of Treatment: You have a history of Hypertension. On this admission, your Blood Pressure was within normal limits. Your blood pressure target is 120-140/80-90, maintain healthy lifestyle, low salt diet, avoid fatty food, and stay active as tolerated. Please continue taking your home medications and follow-up with your primary care doctor upon discharge to adjust medications as needed.

## 2024-11-19 NOTE — ED ADULT TRIAGE NOTE - CHIEF COMPLAINT QUOTE
[facility] [place] [facility] pt called 911,for nausea,abdoinal pain .limbs swelling feeling faint and passed out tonight

## 2024-11-19 NOTE — H&P ADULT - PROBLEM SELECTOR PLAN 1
p/w syncopal episode last night, lasted ~30min  HR 98, /81  EKG NSR with no ST changes  TTE (8/2022): Mild MR, LVEF 60%.    CT: No evidence of acute intracranial pathology.  s/p 1L LR in ED  Admit to tele   Vasovagal from poor oral intake vs. seizure    f/u orthostatic VS p/w syncopal episode last night, lasted ~30min  HR 98, /81  EKG NSR with no ST changes  TTE (8/2022): Mild MR, LVEF 60%.    CT: No evidence of acute intracranial pathology.  s/p 1L LR in ED  Orthostatic VS neg   Admit to tele   Vasovagal from poor oral intake vs. seizure

## 2024-11-19 NOTE — H&P ADULT - ATTENDING COMMENTS
39F, from  [place] ambulates with a walker, with PMHx of Protein C resistance, Factor V Leiden deficiency (on eliquis) c/b DVT/PE (2016), CVA (2019) w/ residual left sided weakness, seizure (on Depakote), hypothyroidism, HTN, T2DM, schizoaffective disorder, bipolar, borderline personality disorder, migraine, ?diverticulosis, GERD who presents to the hospital after a syncopal event last night. Patient was admitted to [facility] from 11/7-11/8 for COVID p/w non- specific multiple complaints. patient endorses not feeling well. said she felt dizzy and " passed out" and when woke up called 911 for ambulance.   all labs, imaging and old records reviewed- no acute pathology- vitals stable,   patient was sitting in ED during evaluation watching tv in no distress, o/e nad comfortable, cta bl [id number]   -no s.s of infection, trop x2 negative  -no indication for furhter testing  -patient was discharged from the ED but refused to leave. 24 hour dc notice.   dc planning  c/w home medications

## 2024-11-19 NOTE — ED PROVIDER NOTE - CLINICAL SUMMARY MEDICAL DECISION MAKING FREE TEXT BOX
Since patient lost consciousness in bed, added lactate and CPK to see if this is seizure.  Since it was not this is possibly loss of consciousness while supine patient needs to be further worked up for syncope.

## 2024-11-19 NOTE — CHART NOTE - NSCHARTNOTEFT_GEN_A_CORE
Patient's workup negative. Patient's workup negative. Patient hemodynamically stable. EKG showed NSR with no ST changes. Troponin was negative x2. Orthostatic was negative. No electrolyte imbalance.     Explained to the patient that her workup has been normal and that she is medically ready to be discharged. VS at the time of assesment HR 83, BP 11/81, O2 98% in RA. Patient refusing to be discharged. Inquired as to why she doesn't want to leave. Patient reports that patient is still feeling unwell and faintish. Explained that her feeling unwell is likely remnant from having COVID in the past 2 weeks. Explained that her orthostatic vitals are negative and her blood pressure is normal. Explained the there is no treatment that can be offered to help her feel better immediately. Explained that risks of being in the hospital, with exposure to other infectious disease, are higher than the benefit of just being monitored. Discussed given she is from a nursing home, they have resources to help her if she is feeling unwell. Explained that there is no medical interventions we can offer. Despite extensive conversation trying to understand the patient's needs and reassure her of her findings patient does not want to be discharged. Patient began crying. Attempted to comfort the patient; however, she does not want to be consoled.     Patient refused to leave when transportation arrived.

## 2024-11-19 NOTE — H&P ADULT - NSHPREVIEWOFSYSTEMS_GEN_ALL_CORE
CONSTITUTIONAL: + fatigue; No fever, weight loss  EYES: No eye pain, visual disturbances, or discharge  ENT:  No difficulty hearing, tinnitus, vertigo; No sinus or throat pain  NECK: No pain or stiffness  RESPIRATORY: + runny nose, cough; No wheezing, chills or hemoptysis; No Shortness of Breath  CARDIOVASCULAR: +chest pain, palpitations, passing out, dizziness, or leg swelling  GASTROINTESTINAL: +LUQ and epigastric pain;  No nausea, vomiting, or hematemesis; No diarrhea or constipation. No melena or hematochezia.  GENITOURINARY: +frequency; No dysuria, hematuria, or incontinence  NEUROLOGICAL: No headaches, memory loss, loss of strength, numbness, or tremors  SKIN: No itching, burning, rashes, or lesions   LYMPH Nodes: No enlarged glands  MUSCULOSKELETAL: No joint pain or swelling; No muscle, back, or extremity pain  PSYCHIATRIC: No depression, anxiety, mood swings, or difficulty sleeping  HEME/LYMPH: No easy bruising, or bleeding gums  ALLERGY AND IMMUNOLOGIC: +pruritus; No eczema

## 2024-11-19 NOTE — ED ADULT NURSE REASSESSMENT NOTE - NS ED NURSE REASSESS COMMENT FT1
Report on Pt given to inpatient ALEYDA Garcia on [address]. Pt was transported to inpatient unit via stretcher on Zoll monitor from the ED, accompanied by RN ASIA and hospital transporter. No distress noted.

## 2024-11-19 NOTE — DISCHARGE NOTE PROVIDER - NSDCMRMEDTOKEN_GEN_ALL_CORE_FT
ALPRAZolam 0.25 mg oral tablet: 1 tab(s) orally once a day  aluminum hydroxide-magnesium hydroxide 200 mg-200 mg/5 mL oral suspension: 30 milliliter(s) orally every 4 hours As needed Dyspepsia  apixaban 5 mg oral tablet: 1 tab(s) orally every 12 hours  atorvastatin 80 mg oral tablet: 1 tab(s) orally once a day (at bedtime)  cloNIDine 0.1 mg oral tablet: 1 orally once a day (at bedtime)  Depakote 250 mg oral delayed release tablet: 3 tab(s) orally 2 times a day  diphenhydrAMINE 50 mg oral capsule: 1 cap(s) orally once a day (at bedtime)  DuoNeb 0.5 mg-2.5 mg/3 mL inhalation solution: 3 milliliter(s) by nebulizer every 4 hours as needed for  shortness of breath and/or wheezing  Flexeril 5 mg oral tablet: 1 tab(s) orally 3 times a day as needed for  moderate pain  levothyroxine 100 mcg (0.1 mg) oral tablet: 1 tab(s) orally once a day  Lexapro 10 mg oral tablet: 1 tab(s) orally once a day  melatonin 3 mg oral tablet: 1 tab(s) orally once a day (at bedtime)  metFORMIN 500 mg oral tablet: 1 tab(s) orally once a day  methocarbamol 500 mg oral tablet: 2 tab(s) orally once a day  naltrexone 50 mg oral tablet: 1 tab(s) orally once a day  QUEtiapine 200 mg oral tablet: 1 tab(s) orally once a day (at bedtime)  QUEtiapine 25 mg oral tablet: 1 tab(s) orally once a day  risperiDONE 3 mg oral tablet: 1 orally once a day (at bedtime)  sucralfate 1 g oral tablet: 1 tab(s) orally 2 times a day  SUMAtriptan 100 mg oral tablet: 1 tab(s) orally 2 times a day as needed for  headache

## 2024-11-19 NOTE — PATIENT PROFILE ADULT - NSTOBACCONEVERSMOKERY/N_GEN_A
Care Management Initial Consult    General Information  Assessment completed with: Patient, VM-chart review,    Type of CM/SW Visit: Chart Assessment    Primary Care Provider verified and updated as needed: Yes   Readmission within the last 30 days: no previous admission in last 30 days      Reason for Consult: discharge planning  Advance Care Planning: Advance Care Planning Reviewed: present on chart          Communication Assessment  Patient's communication style: spoken language (English or Bilingual)             Cognitive  Cognitive/Neuro/Behavioral:                        Living Environment:   People in home: facility resident     Current living Arrangements: extended care facility      Able to return to prior arrangements: yes       Family/Social Support:  Care provided by:    Provides care for:    Marital Status: Single  Children          Description of Support System: Involved, Supportive    Support Assessment: Adequate family and caregiver support, Adequate social supports    Current Resources:   Patient receiving home care services: No     Community Resources: Skilled Nursing Facility  Equipment currently used at home: lift device, wheelchair, manual, grab bar, tub/shower, grab bar, toilet  Supplies currently used at home: Diabetic Supplies, Wound Care Supplies, Incontinence Supplies, Wipes, Chux    Employment/Financial:  Employment Status: retired        Financial Concerns: No concerns identified   Referral to Financial Worker: No       Lifestyle & Psychosocial Needs:  Social Determinants of Health     Tobacco Use: Medium Risk     Smoking Tobacco Use: Former     Smokeless Tobacco Use: Never     Passive Exposure: Not on file   Alcohol Use: Not on file   Financial Resource Strain: Not on file   Food Insecurity: Not on file   Transportation Needs: Not on file   Physical Activity: Not on file   Stress: Not on file   Social Connections: Not on file   Intimate Partner Violence: Not on file   Depression: Not at  risk     PHQ-2 Score: 1   Housing Stability: Not on file       Functional Status:  Prior to admission patient needed assistance:   Dependent ADLs:: Wheelchair-with assist, Bathing, Dressing, Eating, Grooming, Incontinence, Positioning, Transfers, Toileting  Dependent IADLs:: Cleaning, Cooking, Laundry, Shopping, Meal Preparation, Medication Management, Money Management, Transportation, Incontinence  Assesssment of Functional Status: At functional baseline    Mental Health Status:  Mental Health Status: No Current Concerns       Chemical Dependency Status:                Values/Beliefs:  Spiritual, Cultural Beliefs, Scientologist Practices, Values that affect care: no               Additional Information:  LORENA assessed. Pt resides at [facility]. She is dependent with ADLs/IADLs at baseline. Discharge plan to return to facility via medical transportation. Pt had limited participation in assessment, and refused to sign the [employer] notice but did express understanding. CM left copy of notice with pt.    CHRISTIN Simmons         No

## 2024-11-19 NOTE — DISCHARGE NOTE PROVIDER - NSDCPNSUBOBJ_GEN_ALL_CORE
No acute events o/n. vitals stable, no new complaints. patient agrees to be discharged back to [facility]- stable to [place]

## 2024-11-19 NOTE — ED PROVIDER NOTE - OBJECTIVE STATEMENT
40 y/o F, [place] nursing home resident, with PMHx of explosive disorder, mild intellectual disability, CVA (in 2018 on Eliquis), left-sided hemiaplasia, bipolar disorder, seizure disorder, hyperlipidemia, depression, asthma, protein C resistance,  factor V Leiden deficiency w history of DVT/PE, hypothyroidism, GERD who presents to the ED from nursing home for 3 days of  worsening anxiety, chills, productive cough, SOB, palpitations, and fatigue. She also endorses worsening sore throat and mild runny nose over the last 24 hours.  Patient has a history of anxiety but reports feeling worse over the last few days. She endorses subjective fevers, headache, some myalgias as well. No leg swelling or calf pain noted at this time. Patient reports being fully compliant with her anticoagulation medications. Denies any known sick contacts or recent travel. In ED, pt positive for COVID-19. Admitted to medicine for COVID-19 management. Patient unable to return to nursing home at this time due to COVID-19. 38 y/o F, [place] nursing home resident, with PMHx of explosive disorder, mild intellectual disability, CVA (in 2018 on Eliquis), left-sided hemiaplasia, bipolar disorder, seizure disorder, hyperlipidemia, depression, asthma, protein C resistance,  factor V Leiden deficiency w history of DVT/PE, hypothyroidism, GERD who presents to the ED from nursing home With loss of consciousness/syncope while lying in bed.  Patient does not believe it felt like a seizure because she did not have an aura.

## 2024-11-19 NOTE — H&P ADULT - HISTORY OF PRESENT ILLNESS
Patient is a 39F, from  [place] ambulates with a walker, with PMHx of Protein C resistance, Factor V Leiden deficiency (on eliquis) c/b DVT/PE (2016), CVA (2019) w/ residual left sided weakness, seizure (on Depakote), hypothyroidism, HTN, T2DM, schizoaffective disorder, bipolar, borderline personality disorder, migraine, ?diverticulosis, GERD who presents to the hospital after a syncopal event last night. Patient was admitted to [facility] from 11/17-11/8 for COVID and since then has not been feeling well. Patient was in bed when she passed out around 10PM. She was feeling dizzy and nauseous prior to the event. She woke up around 10:30PM and initially felt foggy. She reports midsternal chest pain and palpitation before and after the event. The pain is throbbing in nature and lasts a few seconds. She reports b/l leg swelling since Sunday and feet pain while walking. Patient denies SOB, fever, diarrhea, constipation, leg or arm weakness or numbness.     Patient reports her last seizure was 2 years ago. She reports having auras prior to her seizures. She usually has absence seizures ("blank staring" episodes).      Patient is a 39F, from  [place] ambulates with a walker, with PMHx of Protein C resistance, Factor V Leiden deficiency (on eliquis) c/b DVT/PE (2016), CVA (2019) w/ residual left sided weakness, seizure (on Depakote), hypothyroidism, HTN, T2DM, schizoaffective disorder, bipolar, borderline personality disorder, migraine, ?diverticulosis, GERD who presents to the hospital after a syncopal event last night. Patient was admitted to [facility] from 11/7-11/8 for COVID and since then has not been feeling well. Patient was in bed when she passed out around 10PM. She was feeling dizzy and nauseous prior to the event. She woke up around 10:30PM and initially felt foggy. She reports midsternal chest pain and palpitation before and after the event. The pain is throbbing in nature and lasts a few seconds. She reports b/l leg swelling since Sunday and feet pain while walking. Patient denies SOB, fever, diarrhea, constipation, leg or arm weakness or numbness.     Patient reports her last seizure was 2 years ago. She reports having auras prior to her seizures. She usually has absence seizures ("blank staring" episodes).

## 2024-11-19 NOTE — H&P ADULT - PROBLEM SELECTOR PLAN 9
Hx of bipolar disorder, schizoaffective disorder, borderline personality disorder  on lexapro 10mg qd, risperidone 3mg qd, quetiapine 25mg qd, 200mg qhs, naltrexone 50mg qd, alprazolam 0.25mg qd

## 2024-11-19 NOTE — PATIENT PROFILE ADULT - HAVE YOU BEEN EATING POORLY BECAUSE OF A DECREASED APPETITE?
HPI: I saw Kenroy Hernandes in my office today in cardiovascular evaluation regarding his chronic underlying coronary artery disease in the setting of past cigarette abuse and marked weight loss recently. Mr. Toi Hernandes is a pleasant 64year-old  male who presented to [facility] with chest tightness and acute coronary syndrome, for which we stabilized him medically and did an urgent cardiac catheterization on January 26, 2006. 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 This was opened with a wire and balloon, but we were unable to stent it. There was a residual stenosis of 40% with JB grade 3 flow following the procedure. Left ventricular function at that time was grossly normal with an ejection fraction in the 60% range.      He has done well on medical therapy over the years without any recurrent cardiovascular issues. He unfortunately continued to smoke 2 packs of cigarettes per day over the years and has developed moderately severe COPD with chronic shortness of breath. Fortunately, he finally did quit smoking completely in January of 2014 and has been able to stay off cigarettes now for 4 years. When I saw him last in January 2017 he was complaining of right leg pain and appear to have cellulitis on that leg for which I referred him back to Dr. Shasha Lozano, but ultimately it was found to have some swelling in his right knee and some infection in his right knee for which he ultimately had drainage and was placed on antibiotics with significant improvement. Once his infection resolved and his leg his weight loss issues which were major concern when I last saw him resolved and his weight is now now back up to near baseline.   He does have some underlying lung disease for which she saw Dr. Seema Bahena and was found on x-ray to have some chronic right upper lobe parenchymal scarring and a right hilar lymph node enlargement which is going to be followed up through her office. He is not having any cardiac symptomatology at this time. Encounter Diagnoses   Name Primary?  Atherosclerosis of native coronary artery of native heart without angina pectoris Yes    Dyslipidemia     Hypertensive heart disease      Chronic obstructive pulmonary disease, unspecified COPD type ([facility] [address]        Discussion: This patient appears to be doing reasonably well at this time and I have no recommendations for change. He does have a fairly loud apical systolic murmur, but this is unchanged from what it has been in the past.  Since this is asymptomatic I am not going to do an echocardiogram since the patient is self-pay. He does have some history of dyslipidemia, but his most recent lipid profile on April 4, 2017 was quite good with total cholesterol 157, triglycerides of 149, HDL of 65, LDL of 62.2, and VLDL of 29.8 which I think is good control on Crestor 20 mg daily. His blood pressure is a little borderline elevated today and I checked it again myself and got 145/90 so I have asked him to check his pressure a few hours after taking his blood pressure medications a couple times a week for a while and if his blood pressure stays above 500 systolic he should follow-up with Dr. Barb Pike for further adjustment of his blood pressure medications. Since he is otherwise doing well I will plan to see him again in several months.     PCP: Corrine Womack MD      Past Medical History:   Diagnosis Date    Acute coronary syndrome [facility])     with non-ST-elevation myocardial infarction, s/p direct angioplasty of anomolous circumflex marginal coming off the right coronary cusp on 1/26/06    Arthritis     in hands    ASHD (arteriosclerotic heart disease)     Benign hypertensive heart disease without heart failure     CAD (coronary artery disease)     chronic underlying  Calculus of kidney     Cardiac cath 01/26/2006    LM mild. RCA mild. LAD tortuous. CX mild. CX angela 100%. S/P balloon angioplasty of CX angela. Residual 40%. LVEDP 20.  EF 65%.  Cardiac nuclear imaging test 08/30/2006    No ischemia or infarct. EF 59%. Negative max. stress test.    Chronic cough     coughing up sputum    Chronic lung disease     COPD (chronic obstructive pulmonary disease) ([facility])     moderate to severe    Coronary artery disease     Diabetes ([address]     Diabetes mellitus ([address]     GARCIA (dyspnea on exertion)     GERD (gastroesophageal reflux disease)     H/O: pneumonia     Heartburn     Hypertension     essential systemic    Indigestion     Pneumonia 8/12    \"walking\"    Pure hypercholesterolemia     Tobacco abuse          Past Surgical History:   Procedure Laterality Date    HX HEART CATHETERIZATION  01/26/06    HX ORTHOPAEDIC  1989    fracture of bilateral hands     HX PTCA      of anomalous circumflex marginal artery    TONGUE AND MOUTH SURG UNLISTED  04/04    tongue surgery    TONGUE TO LIP SURGERY  04/04         Current Outpatient Rx   Name  Route  Sig  Dispense  Refill    omeprazole (PRILOSEC) 20 mg capsule    Oral    Take 40 mg by mouth daily.  metformin (GLUCOPHAGE) 500 mg tablet    Oral    Take 500 mg by mouth three (3) times daily (with meals).  hydrochlorothiazide (HYDRODIURIL) 25 mg tablet    Oral    Take 12.5 mg by mouth two (2) times a day.  HYDROcodone-acetaminophen (LORTAB)  mg per tablet    Oral    Take 1 Tab by mouth three (3) times daily.  budesonide-formoterol (SYMBICORT) 160-4.5 mcg/actuation HFA inhaler    Inhalation    Take 2 Puffs by inhalation two (2) times a day. Using PRM              albuterol (PROAIR HFA) 90 mcg/actuation inhaler    Inhalation    Take 1 Puff by inhalation as needed.                 nitroglycerin (NITROSTAT) 0.4 mg SL tablet    SubLINGual    1 Tab by SubLINGual route every five (5) minutes as needed for Chest Pain. 25 Tab    3      lisinopril (PRINIVIL, ZESTRIL) 5 mg tablet    Oral    Take 40 mg by mouth daily.  aspirin 81 mg tablet    Oral    Take 162 mg by mouth daily.  clopidogrel (PLAVIX) 75 mg tablet    Oral    Take 75 mg by mouth daily.  metoprolol (LOPRESSOR) 100 mg tablet    Oral    Take 50 mg by mouth two (2) times a day.  atorvastatin (LIPITOR) 40 mg tablet    Oral    Take 40 mg by mouth daily. No Known Allergies    Social History:   Social History   Substance Use Topics    Smoking status: Former Smoker     Packs/day: 1.25     Years: 20.00     Types: Cigarettes     Quit date: 1/1/2014    Smokeless tobacco: Never Used    Alcohol use Yes      Comment: on special occasions only           Family history: family history includes Asthma in his father; COPD in his mother; Cancer in his father; Colon Polyps in his father. Review of Systems:    Constitutional: Positive for weight loss. Negative for chills, diaphoresis, fever and malaise/fatigue. Respiratory: Negative. Cardiovascular: Positive for claudication, leg swelling and PND. Negative for chest pain, palpitations and orthopnea. Gastrointestinal: Negative. Musculoskeletal: Positive for back pain, joint pain, myalgias and neck pain. Negative for falls. Neurological: Negative for weakness. Physical Exam:   The patient is an alert, oriented, well developed, well nourished 64 y.o.  male who was in no acute distress at the time of my examination.   Visit Vitals    /80    Pulse 80    Ht 6' 1\" (1.854 m)    Wt 73.9 kg (163 lb)    SpO2 96%    BMI 21.51 kg/m2        BP Readings from Last 3 Encounters:   04/26/17 140/80   03/24/17 140/80   02/20/17 131/83        Wt Readings from Last 3 Encounters:   04/26/17 73.9 kg (163 lb)   03/24/17 74.4 kg (164 lb)   02/24/17 74.4 kg (164 lb)       HEENT: Conjunctivae white, mucosa moist, no pallor or cyanosis. Neck: Supple without masses, tenderness or thyromegaly. No jugular venous distention. Carotid upstrokes are full bilaterally with soft bilateral bruits. Cardiovascular: Chest is symmetrical with good excursion. [place] is not displaced. No lifts, heaves or thrills. S1 and S2 are normal, without appreciable rubs, clicks or gallops. There is a grade III/VI apical systolic murmur with poor radiation, without diastolic murmurs. Lungs: Moderate to severe decreased breath sounds throughout with a lot of diffuse expiratory wheezes throughout. Abdomen: Soft. No masses, tenderness or organomegaly. Extremities: Trace edema with 1-2 + peripheral pulses. Review of Data:  See PMH and Cardiology and Imaging sections for cardiac testing      Results for orders placed or performed in visit on 04/26/17   AMB POC EKG ROUTINE W/ 12 LEADS, INTER & REP     Status: None    Narrative    Normal sinus rhythm with rate 80. Complete right bundle branch block. Minor nonspecific anterolateral and high lateral ST-T changes. Compared to the EKG of January 31, 2017 there is little interval change. Melodie Muñoz D.O., F.A.C.C. [facility]  [facility]  [address]    PLEASE NOTE:  This document has been produced using voice recognition software. Unrecognized errors in transcription may be present. No (0)

## 2024-11-19 NOTE — PATIENT PROFILE ADULT - FALL HARM RISK - HARM RISK INTERVENTIONS
Assistance with ambulation/Assistance OOB with selected safe patient handling equipment/Communicate Risk of Fall with Harm to all staff/Discuss with provider need for PT consult/Monitor gait and stability/Provide patient with walking aids - walker, cane, crutches/Reinforce activity limits and safety measures with patient and family/Sit up slowly, dangle for a short time, stand at bedside before walking/Tailored Fall Risk Interventions/Visual Cue: Yellow wristband and red socks/Bed in lowest position, wheels locked, appropriate side rails in place/Call bell, personal items and telephone in reach/Instruct patient to call for assistance before getting out of bed or chair/Non-slip footwear when patient is out of bed/[place] to call system/Physically safe environment - no spills, clutter or unnecessary equipment/Purposeful Proactive Rounding/Room/bathroom lighting operational, light cord in reach

## 2024-11-19 NOTE — H&P ADULT - PROBLEM SELECTOR PLAN 2
Hx of seizures on depakote 750mg qd   Reports absence seizures ("blank stares")  CK 91, lactate 1.8   c/w home meds

## 2024-11-19 NOTE — H&P ADULT - PROBLEM SELECTOR PLAN 5
Hx of CVA with residual left sided weakness on atorvastatin 80mg qd  CT: No evidence of acute intracranial pathology.  c/w home meds

## 2024-11-19 NOTE — DISCHARGE NOTE PROVIDER - HOSPITAL COURSE
Patient is a 39F, from  [place] ambulates with a walker, with PMHx of Protein C resistance, Factor V Leiden deficiency (on eliquis) c/b DVT/PE (2016), CVA (2019) w/ residual left sided weakness, seizure (on Depakote), hypothyroidism, HTN, T2DM, schizoaffective disorder, bipolar, borderline personality disorder, migraine, ?diverticulosis, GERD who presents to the hospital after a syncopal event last night. Patient was admitted to [facility] from 11/7-11/8 for COVID and since then has not been feeling well. Patient was in bed when she passed out around 10PM. She was feeling dizzy and nauseous prior to the event. She woke up around 10:30PM and initially felt foggy. Patient hemodynamically stable. EKG showed NSR with no ST changes. Troponin was negative x2. Orthostatic was negative. No electrolyte imbalance.     Patient is able to ambulate and tolerate diet prior to discharge. Patient is stable for discharge per attending and is advised to follow up with PCP as outpatient.   Please refer to patient's complete medical chart with documents for a full hospital course, for this is only a brief summary.

## 2024-11-20 ENCOUNTER — TRANSCRIPTION ENCOUNTER (OUTPATIENT)
Age: 39
End: 2024-11-20

## 2024-11-20 VITALS
OXYGEN SATURATION: 98 % | SYSTOLIC BLOOD PRESSURE: 111 MMHG | RESPIRATION RATE: 18 BRPM | HEART RATE: 86 BPM | TEMPERATURE: 98 F | DIASTOLIC BLOOD PRESSURE: 78 MMHG

## 2024-11-20 PROCEDURE — 83690 ASSAY OF LIPASE: CPT

## 2024-11-20 PROCEDURE — 84295 ASSAY OF SERUM SODIUM: CPT

## 2024-11-20 PROCEDURE — 86803 HEPATITIS C AB TEST: CPT

## 2024-11-20 PROCEDURE — 80053 COMPREHEN METABOLIC PANEL: CPT

## 2024-11-20 PROCEDURE — 99239 HOSP IP/OBS DSCHRG MGMT >30: CPT

## 2024-11-20 PROCEDURE — 85025 COMPLETE CBC W/AUTO DIFF WBC: CPT

## 2024-11-20 PROCEDURE — 99284 EMERGENCY DEPT VISIT MOD MDM: CPT | Mod: 25

## 2024-11-20 PROCEDURE — 36415 COLL VENOUS BLD VENIPUNCTURE: CPT

## 2024-11-20 PROCEDURE — 71045 X-RAY EXAM CHEST 1 VIEW: CPT

## 2024-11-20 PROCEDURE — 82803 BLOOD GASES ANY COMBINATION: CPT

## 2024-11-20 PROCEDURE — 85379 FIBRIN DEGRADATION QUANT: CPT

## 2024-11-20 PROCEDURE — 84484 ASSAY OF TROPONIN QUANT: CPT

## 2024-11-20 PROCEDURE — 96375 TX/PRO/DX INJ NEW DRUG ADDON: CPT

## 2024-11-20 PROCEDURE — 84702 CHORIONIC GONADOTROPIN TEST: CPT

## 2024-11-20 PROCEDURE — 96361 HYDRATE IV INFUSION ADD-ON: CPT

## 2024-11-20 PROCEDURE — 99285 EMERGENCY DEPT VISIT HI MDM: CPT | Mod: 25

## 2024-11-20 PROCEDURE — 82330 ASSAY OF CALCIUM: CPT

## 2024-11-20 PROCEDURE — 93005 ELECTROCARDIOGRAM TRACING: CPT

## 2024-11-20 PROCEDURE — 86703 HIV-1/HIV-2 1 RESULT ANTBDY: CPT

## 2024-11-20 PROCEDURE — 96374 THER/PROPH/DIAG INJ IV PUSH: CPT

## 2024-11-20 PROCEDURE — 84132 ASSAY OF SERUM POTASSIUM: CPT

## 2024-11-20 PROCEDURE — 83605 ASSAY OF LACTIC ACID: CPT

## 2024-11-20 RX ORDER — METHOCARBAMOL 500 MG/1
500 TABLET, FILM COATED ORAL EVERY 12 HOURS
Refills: 0 | Status: DISCONTINUED | OUTPATIENT
Start: 2024-11-20 | End: 2024-11-20

## 2024-11-20 RX ORDER — DIPHENHYDRAMINE HCL 25 MG
50 CAPSULE ORAL ONCE
Refills: 0 | Status: COMPLETED | OUTPATIENT
Start: 2024-11-20 | End: 2024-11-20

## 2024-11-20 RX ORDER — 0.9 % SODIUM CHLORIDE 0.9 %
1000 INTRAVENOUS SOLUTION INTRAVENOUS
Refills: 0 | Status: DISCONTINUED | OUTPATIENT
Start: 2024-11-20 | End: 2024-11-20

## 2024-11-20 RX ADMIN — METHOCARBAMOL 1000 MILLIGRAM(S): 500 TABLET, FILM COATED ORAL at 11:21

## 2024-11-20 RX ADMIN — ESCITALOPRAM OXALATE 10 MILLIGRAM(S): 10 TABLET, FILM COATED ORAL at 12:06

## 2024-11-20 RX ADMIN — Medication 750 MILLIGRAM(S): at 05:42

## 2024-11-20 RX ADMIN — Medication 0.25 MILLIGRAM(S): at 11:20

## 2024-11-20 RX ADMIN — Medication 50 MILLIGRAM(S): at 12:06

## 2024-11-20 RX ADMIN — Medication 100 MILLILITER(S): at 09:39

## 2024-11-20 RX ADMIN — SUCRALFATE 1 GRAM(S): 1 TABLET ORAL at 05:42

## 2024-11-20 RX ADMIN — Medication 25 MILLIGRAM(S): at 11:21

## 2024-11-20 RX ADMIN — METHOCARBAMOL 500 MILLIGRAM(S): 500 TABLET, FILM COATED ORAL at 00:36

## 2024-11-20 RX ADMIN — APIXABAN 5 MILLIGRAM(S): 2.5 TABLET, FILM COATED ORAL at 05:42

## 2024-11-20 RX ADMIN — NALTREXONE HYDROCHLORIDE 50 MILLIGRAM(S): 50 TABLET, FILM COATED ORAL at 12:06

## 2024-11-20 RX ADMIN — Medication 100 MICROGRAM(S): at 05:42

## 2024-11-20 NOTE — DISCHARGE NOTE NURSING/CASE MANAGEMENT/SOCIAL WORK - FINANCIAL ASSISTANCE
[facility] provides services at a reduced cost to those who are determined to be eligible through [facility]’s financial assistance program. Information regarding [facility]’s financial assistance program can be found by going to [email or web address] or by calling [phone].

## 2024-11-20 NOTE — DISCHARGE NOTE NURSING/CASE MANAGEMENT/SOCIAL WORK - PATIENT PORTAL LINK FT
You can access the FollowMyHealth Patient Portal offered by [facility] by registering at the following website: [email or web address]. By joining [employer]’s FollowMyHealth portal, you will also be able to view your health information using other applications (apps) compatible with our system.

## 2024-11-20 NOTE — DISCHARGE NOTE NURSING/CASE MANAGEMENT/SOCIAL WORK - NSDCPEFALRISK_GEN_ALL_CORE
For information on Fall & Injury Prevention, visit: [email or web address] OR  [email or web address] OR  https://www.cdc.gov/steadi/patient.html

## 2024-11-21 ENCOUNTER — EMERGENCY (EMERGENCY)
Facility: HOSPITAL | Age: 39
LOS: 1 days | Discharge: HOME | End: 2024-11-21
Attending: EMERGENCY MEDICINE | Admitting: EMERGENCY MEDICINE
Payer: MEDICARE

## 2024-11-21 ENCOUNTER — NON-APPOINTMENT (OUTPATIENT)
Age: 39
End: 2024-11-21

## 2024-11-21 VITALS
DIASTOLIC BLOOD PRESSURE: 93 MMHG | RESPIRATION RATE: 20 BRPM | HEART RATE: 94 BPM | HEIGHT: 71 IN | SYSTOLIC BLOOD PRESSURE: 132 MMHG | OXYGEN SATURATION: 100 % | TEMPERATURE: 99 F

## 2024-11-21 DIAGNOSIS — Z90.49 ACQUIRED ABSENCE OF OTHER SPECIFIED PARTS OF DIGESTIVE TRACT: Chronic | ICD-10-CM

## 2024-11-21 LAB
CULTURE RESULTS: SIGNIFICANT CHANGE UP
SPECIMEN SOURCE: SIGNIFICANT CHANGE UP

## 2024-11-21 PROCEDURE — 99285 EMERGENCY DEPT VISIT HI MDM: CPT

## 2024-11-21 NOTE — ED PROVIDER NOTE - OBJECTIVE STATEMENT
39-year-old female history diabetes type 2, hypertension, CVA with left sided weakness, bipolar disorder, hypothyroidism, factor V Leiden deficiency presenting with throat pain after she was eating barbecue chicken this evening.  didn't know that the chicken had bones and thinks she swallowed one. is able to swallow saliva but has pain in the throat when swallowing. is feeling some shortness of breath and lower abdominal pain. resides at [facility]. no fevers/chills, dysuria, diarrhea. 39-year-old female history diabetes type 2, hypertension, CVA with left sided weakness, bipolar disorder, hypothyroidism, factor V Leiden deficiency presenting with throat pain after she was eating barbecue chicken this evening.  didn't know that the chicken had bones and thinks she swallowed one. is able to swallow secretions but has pain in the throat when swallowing. is feeling some shortness of breath and lower abdominal pain. resides at [facility]. no fevers/chills, dysuria, diarrhea.

## 2024-11-21 NOTE — ED PROVIDER NOTE - ATTENDING CONTRIBUTION TO CARE
Brief HPI:   39-year-old female past medical history of diabetes, hypertension, prior CVA, bipolar disorder, hypothyroidism, factor V Leiden on Eliquis, DVT PE presents for swallowed chicken bone this evening.  Patient reports pain her after incident.  Denies difficulty breathing.  Tolerating secretions.    Vitals:   Reviewed    Exam:    GEN:  Non-toxic appearing, non-distressed, speaking full sentences, non-diaphoretic, AAOx3  HEENT:  NCAT, neck supple, EOMI, PERRLA, sclera anicteric, no conjunctival pallor or injection, no stridor, normal voice, no tonsillar exudate, uvula midline  CV:  regular rhythm and rate, s1/s2 audible, no murmurs, rubs or gallops, peripheral pulses 2+ and symmetric  PULM:  non-labored respirations, lungs clear to auscultation bilaterally, no wheezes, crackles or rales  ABD:  non distended, non-tender, no rebound, no guarding, negative 's sign, bowel sounds normal, no cvat  MSK:  no gross deformity, non-tender extremities and joints, range of motion grossly normal appearing, no extremity edema, extremities warm and well perfused   NEURO:  AAOx3, CN II-XII intact, motor 5/5 in upper and lower extremities bilaterally, sensation grossly intact in extremities and trunk, finger to nose testing wnl, no nystagmus, negative Romberg, no pronator drift, no gait deficit  SKIN:  warm, dry, no rash or vesicles     A/P:   39-year-old female past medical history of diabetes, hypertension, prior CVA, bipolar disorder, hypothyroidism, factor V Leiden on Eliquis, DVT PE presents for swallowed chicken bone this evening.   Vital signs stable.  Tolerating secretions, no airway distress.  Will obtain x-ray, ENT consult.  Disposition pending.

## 2024-11-21 NOTE — ED PROVIDER NOTE - CLINICAL SUMMARY MEDICAL DECISION MAKING FREE TEXT BOX
39yF PMH HTN, hypothyroidism, Factor V leiden deficiency, DM type 2, bipolar disorder, CVA with left sided weakness presenting with throat pain after eating bbq chicken this afternoon. thinks she swallowed a chicken bone. she is able to tolerate her own secretions, no drooling, no stridor, lungs ctab, oxygenating well on RA. 39yF PMH HTN, hypothyroidism, Factor V leiden deficiency, DM type 2, bipolar disorder, CVA with left sided weakness presenting with throat pain after eating bbq chicken this afternoon. thinks she swallowed a chicken bone. she is able to tolerate her own secretions, no drooling, no stridor, lungs ctab, oxygenating well on RA. will obtain cxr and neck xray to evaluate for foreign body and consult ent for scope.

## 2024-11-21 NOTE — ED ADULT NURSE NOTE - NSFALLHARMRISKINTERV_ED_ALL_ED

## 2024-11-21 NOTE — ED ADULT NURSE NOTE - CHIEF COMPLAINT QUOTE
Pt arrives from [facility] c/o throat pain s/p swallowing a piece of chicken bone. Pt also reports testing covid + 1 week ago. Denies any SOB, chest pain, dizziness. Pt speaking in full sentences. Able to clear secretion. PMHx DM2, HTN, CVA, hemiplegia, bipolar disorder.

## 2024-11-21 NOTE — ED PROVIDER NOTE - NSTIMEPROVIDERCAREINITIATE_GEN_ER
21-Nov-2024 21:43 Anesthesia Type: 2% lidocaine with epinephrine and a 1:10 solution of 8.4% sodium bicarbonate

## 2024-11-21 NOTE — ED ADULT TRIAGE NOTE - CHIEF COMPLAINT QUOTE
Pt arrives from [facility] c/o throat pain s/p swallowing a piece of chicken bone. Pt also reports testing covid + 1 week ago. Denies any SOB, chest pain, dizziness. Pt speaking in full sentences. Able to clear secretion. PMHx DM2, HTN, CVA, hemiplegia, bipolar disorder.

## 2024-11-21 NOTE — ED PROVIDER NOTE - PROGRESS NOTE DETAILS
Beena Monk PGY-1:  ct negative for foreign body. discussed results with the patient. discussed return precautions and follow up instructions. she understands and agrees. will contact social work to arrange transportation.

## 2024-11-21 NOTE — ED ADULT NURSE NOTE - OBJECTIVE STATEMENT
Pt received from triage on stretcher, A/Ox4 answers all questions appropriately. C/O Throat pain s/p swallowing piece of chicken bone. Pt speaking in full sentences, able to clear secretion. Pt reports testing covid x 1 week ago. Pt denies chest pain and SOB during initial assessment, breathing is even and unlabored on room air. Abdomen is soft and non-distended, non-tender to touch. Pt ambulates w/ assistance at baseline, moves all four extremities on command, skin is intact. Pt resting comfortably at the bedside, no apparent acute visible distress noted on initial assessment. Vital signs stable, NKA to medications. PMHx DM2 HTN CVA Bipolar Disorder. Pending EKG and XR imaging

## 2024-11-21 NOTE — ED PROVIDER NOTE - PHYSICAL EXAMINATION
General: alert, oriented to person, time, place  Psych: mood appropriate  Head: normocephalic; atraumatic  Eyes: PERRLA, EOMI, conjunctivae clear bilaterally, sclerae anicteric  ENT: no nasal flaring, patent nares, no tonsilar erythema, no stridor  Cardio: RRR, no m/r/g, pulses 2+ b/l  Resp: CTAB, no w/r/r  GI: soft/nondistended/nontender  Neuro: moving all four extremities equally  Skin: No evidence of rash or bruising  MSK: normal movement of all extremities  Lymph/Vasc: no LE edema Xelgeez Pregnancy And Lactation Text: This medication is Pregnancy Category D and is not considered safe during pregnancy.  The risk during breast feeding is also uncertain.

## 2024-11-21 NOTE — ED PROVIDER NOTE - NSFOLLOWUPINSTRUCTIONS_ED_ALL_ED_FT
You have been seen and evaluated in the Emergency Department for throat pain. The imaging that was done shows no sign of any foreign body such as a bone in the throat. The results of the imaging are included in this packet.     Please follow up with your Primary Care Provider in 1-2 days regarding today's visit.     Please return to the Emergency Department for worsening pain, unable to keep food or liquids down, fevers, difficulty breathing, or any concerns.

## 2024-11-21 NOTE — ED PROVIDER NOTE - PATIENT PORTAL LINK FT
You can access the FollowMyHealth Patient Portal offered by [facility] by registering at the following website: [email or web address]. By joining [employer]’s FollowMyHealth portal, you will also be able to view your health information using other applications (apps) compatible with our system.

## 2024-11-22 VITALS
HEART RATE: 84 BPM | OXYGEN SATURATION: 100 % | TEMPERATURE: 97 F | RESPIRATION RATE: 18 BRPM | SYSTOLIC BLOOD PRESSURE: 120 MMHG | DIASTOLIC BLOOD PRESSURE: 67 MMHG

## 2024-11-22 LAB
ALBUMIN SERPL ELPH-MCNC: 4.1 G/DL — SIGNIFICANT CHANGE UP (ref 3.3–5)
ALP SERPL-CCNC: 69 U/L — SIGNIFICANT CHANGE UP (ref 40–120)
ALT FLD-CCNC: 86 U/L — HIGH (ref 4–33)
ANION GAP SERPL CALC-SCNC: 13 MMOL/L — SIGNIFICANT CHANGE UP (ref 7–14)
AST SERPL-CCNC: 44 U/L — HIGH (ref 4–32)
BASOPHILS # BLD AUTO: 0.01 K/UL — SIGNIFICANT CHANGE UP (ref 0–0.2)
BASOPHILS NFR BLD AUTO: 0.2 % — SIGNIFICANT CHANGE UP (ref 0–2)
BILIRUB SERPL-MCNC: <0.2 MG/DL — SIGNIFICANT CHANGE UP (ref 0.2–1.2)
BUN SERPL-MCNC: 12 MG/DL — SIGNIFICANT CHANGE UP (ref 7–23)
CALCIUM SERPL-MCNC: 9.5 MG/DL — SIGNIFICANT CHANGE UP (ref 8.4–10.5)
CHLORIDE SERPL-SCNC: 104 MMOL/L — SIGNIFICANT CHANGE UP (ref 98–107)
CO2 SERPL-SCNC: 22 MMOL/L — SIGNIFICANT CHANGE UP (ref 22–31)
CREAT SERPL-MCNC: 0.66 MG/DL — SIGNIFICANT CHANGE UP (ref 0.5–1.3)
EGFR: 116 ML/MIN/1.73M2 — SIGNIFICANT CHANGE UP
EOSINOPHIL # BLD AUTO: 0.03 K/UL — SIGNIFICANT CHANGE UP (ref 0–0.5)
EOSINOPHIL NFR BLD AUTO: 0.5 % — SIGNIFICANT CHANGE UP (ref 0–6)
GLUCOSE SERPL-MCNC: 91 MG/DL — SIGNIFICANT CHANGE UP (ref 70–99)
HCG SERPL-ACNC: <1 MIU/ML — SIGNIFICANT CHANGE UP
HCT VFR BLD CALC: 33.2 % — LOW (ref 34.5–45)
HGB BLD-MCNC: 10.7 G/DL — LOW (ref 11.5–15.5)
IANC: 3.09 K/UL — SIGNIFICANT CHANGE UP (ref 1.8–7.4)
IMM GRANULOCYTES NFR BLD AUTO: 0.5 % — SIGNIFICANT CHANGE UP (ref 0–0.9)
LYMPHOCYTES # BLD AUTO: 2.46 K/UL — SIGNIFICANT CHANGE UP (ref 1–3.3)
LYMPHOCYTES # BLD AUTO: 39.2 % — SIGNIFICANT CHANGE UP (ref 13–44)
MCHC RBC-ENTMCNC: 30.4 PG — SIGNIFICANT CHANGE UP (ref 27–34)
MCHC RBC-ENTMCNC: 32.2 G/DL — SIGNIFICANT CHANGE UP (ref 32–36)
MCV RBC AUTO: 94.3 FL — SIGNIFICANT CHANGE UP (ref 80–100)
MONOCYTES # BLD AUTO: 0.65 K/UL — SIGNIFICANT CHANGE UP (ref 0–0.9)
MONOCYTES NFR BLD AUTO: 10.4 % — SIGNIFICANT CHANGE UP (ref 2–14)
NEUTROPHILS # BLD AUTO: 3.09 K/UL — SIGNIFICANT CHANGE UP (ref 1.8–7.4)
NEUTROPHILS NFR BLD AUTO: 49.2 % — SIGNIFICANT CHANGE UP (ref 43–77)
NRBC # BLD: 0 /100 WBCS — SIGNIFICANT CHANGE UP (ref 0–0)
NRBC # FLD: 0 K/UL — SIGNIFICANT CHANGE UP (ref 0–0)
PLATELET # BLD AUTO: 144 K/UL — LOW (ref 150–400)
POTASSIUM SERPL-MCNC: 4.8 MMOL/L — SIGNIFICANT CHANGE UP (ref 3.5–5.3)
POTASSIUM SERPL-SCNC: 4.8 MMOL/L — SIGNIFICANT CHANGE UP (ref 3.5–5.3)
PROT SERPL-MCNC: 6.9 G/DL — SIGNIFICANT CHANGE UP (ref 6–8.3)
RBC # BLD: 3.52 M/UL — LOW (ref 3.8–5.2)
RBC # FLD: 15.2 % — HIGH (ref 10.3–14.5)
SODIUM SERPL-SCNC: 139 MMOL/L — SIGNIFICANT CHANGE UP (ref 135–145)
WBC # BLD: 6.27 K/UL — SIGNIFICANT CHANGE UP (ref 3.8–10.5)
WBC # FLD AUTO: 6.27 K/UL — SIGNIFICANT CHANGE UP (ref 3.8–10.5)

## 2024-11-22 PROCEDURE — 71046 X-RAY EXAM CHEST 2 VIEWS: CPT | Mod: 26

## 2024-11-22 PROCEDURE — 70490 CT SOFT TISSUE NECK W/O DYE: CPT | Mod: 26,MC

## 2024-11-22 PROCEDURE — 93010 ELECTROCARDIOGRAM REPORT: CPT

## 2024-11-22 PROCEDURE — 70360 X-RAY EXAM OF NECK: CPT | Mod: 26

## 2024-11-22 RX ORDER — ACETAMINOPHEN 500 MG
650 TABLET ORAL ONCE
Refills: 0 | Status: COMPLETED | OUTPATIENT
Start: 2024-11-22 | End: 2024-11-22

## 2024-11-22 RX ORDER — DIPHENHYDRAMINE HCL 12.5MG/5ML
25 ELIXIR ORAL ONCE
Refills: 0 | Status: COMPLETED | OUTPATIENT
Start: 2024-11-22 | End: 2024-11-22

## 2024-11-22 RX ORDER — ALPRAZOLAM 0.25 MG
0.25 TABLET ORAL ONCE
Refills: 0 | Status: DISCONTINUED | OUTPATIENT
Start: 2024-11-22 | End: 2024-11-22

## 2024-11-22 RX ADMIN — Medication 0.25 MILLIGRAM(S): at 07:11

## 2024-11-22 RX ADMIN — Medication 25 MILLIGRAM(S): at 07:11

## 2024-11-22 RX ADMIN — Medication 650 MILLIGRAM(S): at 07:11

## 2024-11-22 NOTE — CONSULT NOTE ADULT - ASSESSMENT
YODIT MORROW  is a 39y Female PMH diabetes type 2, hypertension, CVA with left sided weakness, bipolar disorder, hypothyroidism, factor V Leiden deficiency presenting with throat pain after she was eating barbecue chicken this evening.  Didn't know that the chicken had bones and thinks she swallowed one. Able to swallow secretions but has pain in the throat when swallowing. Endorses some SOB and lower abdominal pain. On exam, patient tolerating secretions. Unable to tolerate scope due to strong gag reflex.    Plan:  - F/u CXR, CT to r/o foreign body

## 2024-11-22 NOTE — PROVIDER CONTACT NOTE (OTHER) - SITUATION
Man Nielsen, PT, DPT Spoke to [facility] to arrange transport @ [phone]. No confirmation # provided. ETA 60-90 min, RN supervisor @ [facility] aware that patient will be returning to facility.

## 2024-11-22 NOTE — CONSULT NOTE ADULT - SUBJECTIVE AND OBJECTIVE BOX
OTOLARYNGOLOGY (ENT) CONSULTATION NOTE    PATIENT: YODIT MORROW     MRN: [id number]       : 89  DATE OF ADMISSION:24  DATE OF SERVICE:  24 @ 00:03    HISTORY OF PRESENT ILLNESS:  YODIT MORROW  is a 39y Female PMH diabetes type 2, hypertension, CVA with left sided weakness, bipolar disorder, hypothyroidism, factor V Leiden deficiency presenting with throat pain after she was eating barbecue chicken this evening.  Didn't know that the chicken had bones and thinks she swallowed one. Able to swallow secretions but has pain in the throat when swallowing. Endorses some SOB and lower abdominal pain.     PAST MEDICAL HISTORY:  Asthma    Seizure    Factor V Leiden    Bipolar disorder    Endometriosis    History of DVT in adulthood    Seizures    Hypothyroid    Seasonal allergies    Insomnia    GERD (gastroesophageal reflux disease)    HTN (hypertension)    Diabetes    DVT (deep venous thrombosis)    Factor V Leiden        CURRENT MEDICATIONS       HOME MEDICATIONS:  ALPRAZolam 0.25 mg oral tablet  aluminum hydroxide-magnesium hydroxide 200 mg-200 mg/5 mL oral suspension  atorvastatin 80 mg oral tablet  cloNIDine 0.1 mg oral tablet  Depakote 250 mg oral delayed release tablet  diphenhydrAMINE 50 mg oral capsule  DuoNeb 0.5 mg-2.5 mg/3 mL inhalation solution  Flexeril 5 mg oral tablet  levothyroxine 100 mcg (0.1 mg) oral tablet  Lexapro 10 mg oral tablet  melatonin 3 mg oral tablet  metFORMIN 500 mg oral tablet  naltrexone 50 mg oral tablet  QUEtiapine 200 mg oral tablet  QUEtiapine 25 mg oral tablet  risperiDONE 3 mg oral tablet  sucralfate 1 g oral tablet  SUMAtriptan 100 mg oral tablet      ALLERGIES:  latex (Blisters)  Toradol (Hives)  penicillin (Hives)  TraZODone Hydrochloride (Unknown)  [This allergen will not trigger allergy alert] Toradol SOLN (Unknown)  TraMADol Hydrochloride (Unknown)  ibuprofen (Pruritus)  Zofran (Hives)  gabapentin (Unknown)  [This allergen will not trigger allergy alert] pineapple allergenic extract (Swelling)  trazodone (Other)    SOCIAL HISTORY: Pertinent included in HPI   FAMILY HISTORY: Pertinent included in HPI   No pertinent family history in first degree relatives    Family history of DVT (Mother)        SURGICAL HISTORY: Pertinent included in HPI   No significant past surgical history    No significant past surgical history    S/P appendectomy        PHYSICAL EXAMINATION:  General: NAD, A+Ox3  Respiratory: No respiratory distress, stridor, or stertor  OU: EOMI  Right: Pinna wnl  Left: Pinna wnl  Nose: nasal cavity clear bilaterally  OC/OP: tongue normal, floor of mouth WNL, no masses or lesions, OP clear  Neck: soft/flat, no LAD    Vital Signs:  T(C): 37.1 (24 @ 19:03), Max: 37.1 (24 @ 19:03)  HR: 94 (24 @ 19:03) (94 - 94)  BP: 132/93 (24 @ 19:03) (132/93 - 132/93)  RR: 20 (24 @ 19:03) (20 - 20)  SpO2: 100% (24 @ 19:03) (100% - 100%)        [id number]    MICROBIOLOGY:      PATHOLOGY:

## 2024-11-22 NOTE — ED ADULT NURSE REASSESSMENT NOTE - NS ED NURSE REASSESS COMMENT FT1
Per ED Provider, Pt cleared to be d/c back to her assisted living facility. Pt to be transported via [facility]. A/Ox4 ambulated w/ steady gait. Vital signs stable.

## 2024-11-24 ENCOUNTER — EMERGENCY (EMERGENCY)
Facility: HOSPITAL | Age: 39
LOS: 1 days | Discharge: HOME | End: 2024-11-24
Attending: EMERGENCY MEDICINE
Payer: MEDICARE

## 2024-11-24 VITALS
SYSTOLIC BLOOD PRESSURE: 111 MMHG | HEIGHT: 71 IN | DIASTOLIC BLOOD PRESSURE: 79 MMHG | TEMPERATURE: 99 F | OXYGEN SATURATION: 96 % | WEIGHT: 293 LBS | HEART RATE: 110 BPM | RESPIRATION RATE: 18 BRPM

## 2024-11-24 DIAGNOSIS — Z90.49 ACQUIRED ABSENCE OF OTHER SPECIFIED PARTS OF DIGESTIVE TRACT: Chronic | ICD-10-CM

## 2024-11-24 PROCEDURE — 99291 CRITICAL CARE FIRST HOUR: CPT

## 2024-11-24 RX ORDER — SODIUM CHLORIDE 9 MG/ML
1000 INJECTION, SOLUTION INTRAMUSCULAR; INTRAVENOUS; SUBCUTANEOUS ONCE
Refills: 0 | Status: COMPLETED | OUTPATIENT
Start: 2024-11-24 | End: 2024-11-24

## 2024-11-25 ENCOUNTER — EMERGENCY (EMERGENCY)
Facility: HOSPITAL | Age: 39
LOS: 1 days | Discharge: HOME | End: 2024-11-25
Attending: EMERGENCY MEDICINE
Payer: MEDICARE

## 2024-11-25 ENCOUNTER — NON-APPOINTMENT (OUTPATIENT)
Age: 39
End: 2024-11-25

## 2024-11-25 VITALS
RESPIRATION RATE: 17 BRPM | HEART RATE: 59 BPM | OXYGEN SATURATION: 97 % | SYSTOLIC BLOOD PRESSURE: 134 MMHG | TEMPERATURE: 98 F | DIASTOLIC BLOOD PRESSURE: 88 MMHG

## 2024-11-25 VITALS
WEIGHT: 293 LBS | HEIGHT: 71 IN | TEMPERATURE: 98 F | OXYGEN SATURATION: 97 % | SYSTOLIC BLOOD PRESSURE: 132 MMHG | DIASTOLIC BLOOD PRESSURE: 92 MMHG | HEART RATE: 82 BPM | RESPIRATION RATE: 18 BRPM

## 2024-11-25 DIAGNOSIS — Z90.49 ACQUIRED ABSENCE OF OTHER SPECIFIED PARTS OF DIGESTIVE TRACT: Chronic | ICD-10-CM

## 2024-11-25 LAB
ALBUMIN SERPL ELPH-MCNC: 3.5 G/DL — SIGNIFICANT CHANGE UP (ref 3.5–5)
ALP SERPL-CCNC: 71 U/L — SIGNIFICANT CHANGE UP (ref 40–120)
ALT FLD-CCNC: 70 U/L DA — HIGH (ref 10–60)
ANION GAP SERPL CALC-SCNC: 6 MMOL/L — SIGNIFICANT CHANGE UP (ref 5–17)
APPEARANCE UR: ABNORMAL
APTT BLD: 30.7 SEC — SIGNIFICANT CHANGE UP (ref 24.5–35.6)
AST SERPL-CCNC: 20 U/L — SIGNIFICANT CHANGE UP (ref 10–40)
BASOPHILS # BLD AUTO: 0.02 K/UL — SIGNIFICANT CHANGE UP (ref 0–0.2)
BASOPHILS NFR BLD AUTO: 0.3 % — SIGNIFICANT CHANGE UP (ref 0–2)
BILIRUB SERPL-MCNC: 0.2 MG/DL — SIGNIFICANT CHANGE UP (ref 0.2–1.2)
BILIRUB UR-MCNC: NEGATIVE — SIGNIFICANT CHANGE UP
BUN SERPL-MCNC: 9 MG/DL — SIGNIFICANT CHANGE UP (ref 7–18)
CALCIUM SERPL-MCNC: 9.1 MG/DL — SIGNIFICANT CHANGE UP (ref 8.4–10.5)
CHLORIDE SERPL-SCNC: 111 MMOL/L — HIGH (ref 96–108)
CO2 SERPL-SCNC: 24 MMOL/L — SIGNIFICANT CHANGE UP (ref 22–31)
COLOR SPEC: YELLOW — SIGNIFICANT CHANGE UP
CREAT SERPL-MCNC: 0.7 MG/DL — SIGNIFICANT CHANGE UP (ref 0.5–1.3)
DIFF PNL FLD: NEGATIVE — SIGNIFICANT CHANGE UP
EGFR: 114 ML/MIN/1.73M2 — SIGNIFICANT CHANGE UP
EOSINOPHIL # BLD AUTO: 0.04 K/UL — SIGNIFICANT CHANGE UP (ref 0–0.5)
EOSINOPHIL NFR BLD AUTO: 0.6 % — SIGNIFICANT CHANGE UP (ref 0–6)
GLUCOSE SERPL-MCNC: 90 MG/DL — SIGNIFICANT CHANGE UP (ref 70–99)
GLUCOSE UR QL: NEGATIVE MG/DL — SIGNIFICANT CHANGE UP
HCG SERPL-ACNC: <1 MIU/ML — SIGNIFICANT CHANGE UP
HCT VFR BLD CALC: 31.1 % — LOW (ref 34.5–45)
HGB BLD-MCNC: 10.4 G/DL — LOW (ref 11.5–15.5)
IMM GRANULOCYTES NFR BLD AUTO: 0.8 % — SIGNIFICANT CHANGE UP (ref 0–0.9)
INR BLD: 1.12 RATIO — SIGNIFICANT CHANGE UP (ref 0.85–1.16)
KETONES UR-MCNC: ABNORMAL MG/DL
LEUKOCYTE ESTERASE UR-ACNC: NEGATIVE — SIGNIFICANT CHANGE UP
LIDOCAIN IGE QN: 40 U/L — SIGNIFICANT CHANGE UP (ref 13–75)
LYMPHOCYTES # BLD AUTO: 2.88 K/UL — SIGNIFICANT CHANGE UP (ref 1–3.3)
LYMPHOCYTES # BLD AUTO: 44.4 % — HIGH (ref 13–44)
MCHC RBC-ENTMCNC: 31.5 PG — SIGNIFICANT CHANGE UP (ref 27–34)
MCHC RBC-ENTMCNC: 33.4 G/DL — SIGNIFICANT CHANGE UP (ref 32–36)
MCV RBC AUTO: 94.2 FL — SIGNIFICANT CHANGE UP (ref 80–100)
MONOCYTES # BLD AUTO: 0.59 K/UL — SIGNIFICANT CHANGE UP (ref 0–0.9)
MONOCYTES NFR BLD AUTO: 9.1 % — SIGNIFICANT CHANGE UP (ref 2–14)
NEUTROPHILS # BLD AUTO: 2.91 K/UL — SIGNIFICANT CHANGE UP (ref 1.8–7.4)
NEUTROPHILS NFR BLD AUTO: 44.8 % — SIGNIFICANT CHANGE UP (ref 43–77)
NITRITE UR-MCNC: NEGATIVE — SIGNIFICANT CHANGE UP
NRBC # BLD: 0 /100 WBCS — SIGNIFICANT CHANGE UP (ref 0–0)
PH UR: 6.5 — SIGNIFICANT CHANGE UP (ref 5–8)
PLATELET # BLD AUTO: 125 K/UL — LOW (ref 150–400)
POTASSIUM SERPL-MCNC: 4.2 MMOL/L — SIGNIFICANT CHANGE UP (ref 3.5–5.3)
POTASSIUM SERPL-SCNC: 4.2 MMOL/L — SIGNIFICANT CHANGE UP (ref 3.5–5.3)
PROT SERPL-MCNC: 7 G/DL — SIGNIFICANT CHANGE UP (ref 6–8.3)
PROT UR-MCNC: NEGATIVE MG/DL — SIGNIFICANT CHANGE UP
PROTHROM AB SERPL-ACNC: 13.1 SEC — SIGNIFICANT CHANGE UP (ref 9.9–13.4)
RBC # BLD: 3.3 M/UL — LOW (ref 3.8–5.2)
RBC # FLD: 15.2 % — HIGH (ref 10.3–14.5)
SODIUM SERPL-SCNC: 141 MMOL/L — SIGNIFICANT CHANGE UP (ref 135–145)
SP GR SPEC: 1.03 — SIGNIFICANT CHANGE UP (ref 1–1.03)
UROBILINOGEN FLD QL: 1 MG/DL — SIGNIFICANT CHANGE UP (ref 0.2–1)
WBC # BLD: 6.49 K/UL — SIGNIFICANT CHANGE UP (ref 3.8–10.5)
WBC # FLD AUTO: 6.49 K/UL — SIGNIFICANT CHANGE UP (ref 3.8–10.5)

## 2024-11-25 PROCEDURE — 99284 EMERGENCY DEPT VISIT MOD MDM: CPT

## 2024-11-25 PROCEDURE — 74176 CT ABD & PELVIS W/O CONTRAST: CPT | Mod: 26,MC

## 2024-11-25 PROCEDURE — 73610 X-RAY EXAM OF ANKLE: CPT | Mod: 26,LT

## 2024-11-25 RX ORDER — ACETAMINOPHEN 500 MG
1000 TABLET ORAL ONCE
Refills: 0 | Status: COMPLETED | OUTPATIENT
Start: 2024-11-25 | End: 2024-11-25

## 2024-11-25 RX ORDER — DIPHENHYDRAMINE HCL 12.5MG/5ML
25 ELIXIR ORAL ONCE
Refills: 0 | Status: COMPLETED | OUTPATIENT
Start: 2024-11-25 | End: 2024-11-25

## 2024-11-25 RX ORDER — MORPHINE SULFATE 30 MG/1
4 TABLET, EXTENDED RELEASE ORAL ONCE
Refills: 0 | Status: DISCONTINUED | OUTPATIENT
Start: 2024-11-25 | End: 2024-11-25

## 2024-11-25 RX ORDER — PHENOBARBITAL, HYOSCYAMINE SULFATE, ATROPINE SULFATE, SCOPOLAMINE HYDROBROMIDE 16.2; .1307; .0194; .0065 MG/5ML; MG/5ML; MG/5ML; MG/5ML
10 ELIXIR ORAL ONCE
Refills: 0 | Status: COMPLETED | OUTPATIENT
Start: 2024-11-25 | End: 2024-11-25

## 2024-11-25 RX ORDER — PANTOPRAZOLE SODIUM 40 MG/1
40 TABLET, DELAYED RELEASE ORAL ONCE
Refills: 0 | Status: COMPLETED | OUTPATIENT
Start: 2024-11-25 | End: 2024-11-25

## 2024-11-25 RX ORDER — DIPHENHYDRAMINE HCL 12.5MG/5ML
50 ELIXIR ORAL ONCE
Refills: 0 | Status: COMPLETED | OUTPATIENT
Start: 2024-11-25 | End: 2024-11-25

## 2024-11-25 RX ORDER — IOHEXOL 300 MG/ML
30 INJECTION, SOLUTION INTRAVENOUS ONCE
Refills: 0 | Status: COMPLETED | OUTPATIENT
Start: 2024-11-25 | End: 2024-11-25

## 2024-11-25 RX ORDER — ACETAMINOPHEN 500 MG
650 TABLET ORAL ONCE
Refills: 0 | Status: COMPLETED | OUTPATIENT
Start: 2024-11-25 | End: 2024-11-25

## 2024-11-25 RX ORDER — DIPHENHYDRAMINE HCL 12.5MG/5ML
50 ELIXIR ORAL ONCE
Refills: 0 | Status: ACTIVE | OUTPATIENT
Start: 2024-11-25 | End: 2024-11-25

## 2024-11-25 RX ORDER — ALPRAZOLAM 0.25 MG
0.25 TABLET ORAL ONCE
Refills: 0 | Status: DISCONTINUED | OUTPATIENT
Start: 2024-11-25 | End: 2024-11-25

## 2024-11-25 RX ORDER — MAGNESIUM, ALUMINUM HYDROXIDE 200-200 MG
30 TABLET,CHEWABLE ORAL ONCE
Refills: 0 | Status: COMPLETED | OUTPATIENT
Start: 2024-11-25 | End: 2024-11-25

## 2024-11-25 RX ADMIN — IOHEXOL 30 MILLILITER(S): 300 INJECTION, SOLUTION INTRAVENOUS at 02:23

## 2024-11-25 RX ADMIN — Medication 400 MILLIGRAM(S): at 12:00

## 2024-11-25 RX ADMIN — Medication 50 MILLIGRAM(S): at 02:51

## 2024-11-25 RX ADMIN — Medication 25 MILLIGRAM(S): at 13:25

## 2024-11-25 RX ADMIN — PANTOPRAZOLE SODIUM 40 MILLIGRAM(S): 40 TABLET, DELAYED RELEASE ORAL at 02:24

## 2024-11-25 RX ADMIN — SODIUM CHLORIDE 1000 MILLILITER(S): 9 INJECTION, SOLUTION INTRAMUSCULAR; INTRAVENOUS; SUBCUTANEOUS at 02:24

## 2024-11-25 RX ADMIN — Medication 50 MILLIGRAM(S): at 06:55

## 2024-11-25 RX ADMIN — MORPHINE SULFATE 4 MILLIGRAM(S): 30 TABLET, EXTENDED RELEASE ORAL at 06:56

## 2024-11-25 RX ADMIN — Medication 0.25 MILLIGRAM(S): at 02:51

## 2024-11-25 RX ADMIN — MORPHINE SULFATE 4 MILLIGRAM(S): 30 TABLET, EXTENDED RELEASE ORAL at 13:25

## 2024-11-25 NOTE — ED PROVIDER NOTE - NSFOLLOWUPINSTRUCTIONS_ED_ALL_ED_FT
He came to the emergency department complaining of abdominal pain.  You had recent labs and imaging showing no acute process.  You refused to receive any other medication for pain.  You have not exhibited any vomiting in the ED.

## 2024-11-25 NOTE — ED PROVIDER NOTE - PROGRESS NOTE DETAILS
Patient later states that she has ankle pain and she suspect she broke her ankle.  States that she had injury years ago.  Reports having pain for 3 days.  Has been observed to ambulate without difficulty in ED.  Ordered x-ray and will follow-up results.

## 2024-11-25 NOTE — ED ADULT NURSE NOTE - OBJECTIVE STATEMENT
Received pt with c/o abd pain and left foot pain. Pt reported she fell 1 month ago and might have sprained her left ankle. Ace bandage noted to left foot. Xray done. A&Ox4. Skin warm and dry. NAD at this time. Awaiting orders.

## 2024-11-25 NOTE — ED ADULT NURSE NOTE - OBJECTIVE STATEMENT
Patient arrived to the ER from home c/o mid abdominal pain and nausea with dark red blood in stool x2 episodes since yesterday

## 2024-11-25 NOTE — ED PROVIDER NOTE - PATIENT PORTAL LINK FT
You can access the FollowMyHealth Patient Portal offered by [facility] by registering at the following website: [email or web address]. By joining [employer]’s FollowMyHealth portal, you will also be able to view your health information using other applications (apps) compatible with our system.

## 2024-11-25 NOTE — ED PROVIDER NOTE - NSFOLLOWUPINSTRUCTIONS_ED_ALL_ED_FT
Return to ER immediately if abdominal pain or any pain does not improve, worsens, or persists, fever, vomiting,  blood in stools or having black stools, unable to eat or drink, worsening/persistent diarrhea or constipation, any concerns. See your doctor as soon as possible (within 1-2 days).   If you need further assistance for appointments you can contact the [place] Care Coordinator at [phone]. In addition our outpatient [facility] is located at [address] [place] [address], tele: [phone].

## 2024-11-25 NOTE — ED ADULT NURSE NOTE - NSFALLRISKINTERV_ED_ALL_ED

## 2024-11-25 NOTE — ED PROVIDER NOTE - CLINICAL SUMMARY MEDICAL DECISION MAKING FREE TEXT BOX
CT reported No bowel obstruction. Moderate stool burden in the colon. No right or left hydroureteronephrosis or obstructing urinary tract   calculus. No nephrolithiasis. Diffuse hepatic steatosis.  Pt feels better, has no guarding to repeat abdominal palpation, able to eat and drink without vomiting. Pt will f/u with GI-contact provided.   Pt is well appearing, has no new complaints and able to walk with normal gait. Pt is stable for discharge and follow up with medical doctor. Pt educated on care and need for follow up. Discussed anticipatory guidance and return precautions. Questions answered. I had a detailed discussion with the patient regarding the historical points, exam findings, and any diagnostic results supporting the discharge diagnosis. CT reported No bowel obstruction. Moderate stool burden in the colon. No right or left hydroureteronephrosis or obstructing urinary tract   calculus. No nephrolithiasis. Diffuse hepatic steatosis.  Pt feels better, has no guarding to repeat abdominal palpation, able to eat and drink without vomiting. Pt will f/u with GI-contact provided.   Pt is well appearing, has no new complaints and able to walk with normal gait. Pt is stable for discharge and follow up with medical doctor. Pt educated on care and need for follow up. Discussed anticipatory guidance and return precautions. Questions answered. I had a detailed discussion with the patient regarding the historical points, exam findings, and any diagnostic results supporting the discharge diagnosis.  Safe transport arranged for patient    645a- Patient adamantly requesting dose of morphine for her chronic abdominal pain/colitis.  Patient also insist that additional Benadryl  50 mg was to be administered with morphine.  I spoke at length with patient along with nurse manager that CT does not indicated any surgical emergency. pt still requesting analgesia. .  Will provide 1 dose in ED, p

## 2024-11-25 NOTE — ED PROVIDER NOTE - CRITICAL CARE ATTENDING CONTRIBUTION TO CARE
Chandra: Due to the presence of dependency and social issues and the risk of sudden rapid deterioration due to my attendance to this patient required critical care time of approx 100 minutes including assessment/reassessment, documentation, ordering and interpreting ancillary studies, discussion with ED staff and consultants, patient and their family, and excludes time spent in teaching of Residents and time spent on separately billable procedures.

## 2024-11-25 NOTE — ED PROVIDER NOTE - PHYSICAL EXAMINATION
No distress  Pt signals to lower abdominal area as location of tenderness, no guarding to full palpation of abdomen. No distress  Pt signals to lower abdominal area as location of tenderness, no guarding to full palpation of abdomen.  No calf tenderness

## 2024-11-25 NOTE — ED PROVIDER NOTE - CLINICAL SUMMARY MEDICAL DECISION MAKING FREE TEXT BOX
39-year-old female presenting with abdominal pain.  Nontender abdomen.  Normal active bowel sounds.  Appears well-nourished and well-hydrated.  Stable vitals, afebrile.  Recent evaluation with normal CT imaging of abd/pelvis.  Tolerating p.o. without difficulty in ED.  Asking for morphine and Benadryl for pain refusing any other alternate pain regimen.  Plan to discharge back to nursing home.

## 2024-11-25 NOTE — ED PROVIDER NOTE - OBJECTIVE STATEMENT
39-year-old female history of HTN, HLD, bipolar disorder, CVA, constipation, factor V Leiden deficiency presenting with diffuse abdominal pain for past 3 days.  Patient feels like she has colitis.  Patient was seen earlier today had lab work and CT and was discharged back to nursing home.  Patient returned due to abdominal pain.  States that her pain is "100 out of 10" states she needs morphine and Benadryl for her symptoms.  States she has vomiting and diarrhea.  Also states that she is hungry.  Denies having any fever, cough, chest pain, shortness of breath or urinary symptoms.

## 2024-11-25 NOTE — ED PROVIDER NOTE - OBJECTIVE STATEMENT
39-year-old female history of colitis, chief complaint of diffuse abdominal discomfort associated with bloody stools for 4 days.  Patient nauseous, denies any fever, no chest pain, no shortness of breath, no reported fever. 39-year-old female history of colitis, chief complaint of diffuse abdominal discomfort associated with bloody stools for 4 days.  Patient nauseous, denies any fever, no chest pain, no shortness of breath, no reported fever. No calf tenderness.

## 2024-11-25 NOTE — ED PROVIDER NOTE - NSFOLLOWUPCLINICS_GEN_ALL_ED_FT
[facility]  Internal Medicine  [address] [place] [address]  Phone: [phone]  Fax: [phone]

## 2024-11-25 NOTE — ED PROVIDER NOTE - PROGRESS NOTE DETAILS
armstrong: pt refusing to leave- states she will come back. pt has been asking for more pain meds. will contact sw and admin. armstrong: pt states she has pain still. can't take nsaids, nsg home not treating her pain. allergic to morphine and needs benadryl. pt medically cleared by prior MD and reassess pt is cleared. offer iv tylenol and when ambulette arrives we will give morphine 4mg. pt states morphine 4 is too little. pt finally agree. will call transportation again., SW saw pt and noted rec

## 2024-11-25 NOTE — ED PROVIDER NOTE - PATIENT PORTAL LINK FT
You can access the FollowMyHealth Patient Portal offered by [facility] by registering at the following website: [email or web address]. By joining [employer]’s FollowMyHealth portal, you will also be able to view your health information using other applications (apps) compatible with our system.

## 2024-11-25 NOTE — CHART NOTE - NSCHARTNOTEFT_GEN_A_CORE
SW consult received for "refusing to leave". Pt is a 39 year old female coming to the hospital from [facility] for abdominal pain. RAVINDRA met with pt at bedside, introduced herself and explained her role. Pt is AOx3, engaging and receptive of SW.    Pt stated came to the hospital due to abdominal pain and dark stools. Pt stated she had a CT done which showed colitis. Pt stated she is still not feeling well and does not want to leave. Emotional support provided. Pt stated she received Morphine and Benadryl. but stated she is still not feeling well. Pt requested to stay 2 more days. RAVINDRA informed pt she will follow up with the medical team. Pt expressed gratitude and requested to speak to the dr as well.     RAVINDRA discussed case with pt's provider. Provider stated pt is medically cleared and stated he will speak to her again to see if she will be dc.  Pt's provider followed up with RAVINDRA later in the day and informed her pt agreed to be dc.    Pt to be dc back to [place].

## 2024-11-26 VITALS
SYSTOLIC BLOOD PRESSURE: 125 MMHG | OXYGEN SATURATION: 99 % | DIASTOLIC BLOOD PRESSURE: 89 MMHG | TEMPERATURE: 98 F | RESPIRATION RATE: 18 BRPM | HEART RATE: 95 BPM

## 2024-11-26 PROCEDURE — 84100 ASSAY OF PHOSPHORUS: CPT

## 2024-11-26 PROCEDURE — 99285 EMERGENCY DEPT VISIT HI MDM: CPT | Mod: 25

## 2024-11-26 PROCEDURE — 84484 ASSAY OF TROPONIN QUANT: CPT

## 2024-11-26 PROCEDURE — 82550 ASSAY OF CK (CPK): CPT

## 2024-11-26 PROCEDURE — 85025 COMPLETE CBC W/AUTO DIFF WBC: CPT

## 2024-11-26 PROCEDURE — 36415 COLL VENOUS BLD VENIPUNCTURE: CPT

## 2024-11-26 PROCEDURE — 80053 COMPREHEN METABOLIC PANEL: CPT

## 2024-11-26 PROCEDURE — 86900 BLOOD TYPING SEROLOGIC ABO: CPT

## 2024-11-26 PROCEDURE — 87086 URINE CULTURE/COLONY COUNT: CPT

## 2024-11-26 PROCEDURE — 96375 TX/PRO/DX INJ NEW DRUG ADDON: CPT

## 2024-11-26 PROCEDURE — 81001 URINALYSIS AUTO W/SCOPE: CPT

## 2024-11-26 PROCEDURE — 93005 ELECTROCARDIOGRAM TRACING: CPT

## 2024-11-26 PROCEDURE — 86850 RBC ANTIBODY SCREEN: CPT

## 2024-11-26 PROCEDURE — 85379 FIBRIN DEGRADATION QUANT: CPT

## 2024-11-26 PROCEDURE — 71045 X-RAY EXAM CHEST 1 VIEW: CPT

## 2024-11-26 PROCEDURE — 83735 ASSAY OF MAGNESIUM: CPT

## 2024-11-26 PROCEDURE — 99284 EMERGENCY DEPT VISIT MOD MDM: CPT | Mod: 25

## 2024-11-26 PROCEDURE — 85730 THROMBOPLASTIN TIME PARTIAL: CPT

## 2024-11-26 PROCEDURE — 84702 CHORIONIC GONADOTROPIN TEST: CPT

## 2024-11-26 PROCEDURE — 83880 ASSAY OF NATRIURETIC PEPTIDE: CPT

## 2024-11-26 PROCEDURE — 84443 ASSAY THYROID STIM HORMONE: CPT

## 2024-11-26 PROCEDURE — 83690 ASSAY OF LIPASE: CPT

## 2024-11-26 PROCEDURE — 74176 CT ABD & PELVIS W/O CONTRAST: CPT | Mod: MC

## 2024-11-26 PROCEDURE — 96376 TX/PRO/DX INJ SAME DRUG ADON: CPT

## 2024-11-26 PROCEDURE — 96374 THER/PROPH/DIAG INJ IV PUSH: CPT

## 2024-11-26 PROCEDURE — 83605 ASSAY OF LACTIC ACID: CPT

## 2024-11-26 PROCEDURE — 82962 GLUCOSE BLOOD TEST: CPT

## 2024-11-26 PROCEDURE — 87637 SARSCOV2&INF A&B&RSV AMP PRB: CPT

## 2024-11-26 PROCEDURE — 70450 CT HEAD/BRAIN W/O DYE: CPT | Mod: MC

## 2024-11-26 PROCEDURE — 85610 PROTHROMBIN TIME: CPT

## 2024-11-26 PROCEDURE — 86901 BLOOD TYPING SEROLOGIC RH(D): CPT

## 2024-11-26 PROCEDURE — 73610 X-RAY EXAM OF ANKLE: CPT

## 2024-11-26 RX ORDER — APIXABAN 5 MG/1
5 TABLET, FILM COATED ORAL ONCE
Refills: 0 | Status: COMPLETED | OUTPATIENT
Start: 2024-11-26 | End: 2024-11-26

## 2024-11-26 RX ORDER — DIPHENHYDRAMINE HCL 12.5MG/5ML
50 ELIXIR ORAL ONCE
Refills: 0 | Status: COMPLETED | OUTPATIENT
Start: 2024-11-26 | End: 2024-11-26

## 2024-11-26 RX ORDER — ALPRAZOLAM 0.25 MG
0.25 TABLET ORAL ONCE
Refills: 0 | Status: DISCONTINUED | OUTPATIENT
Start: 2024-11-26 | End: 2024-11-26

## 2024-11-26 RX ORDER — CLONIDINE HYDROCHLORIDE 0.2 MG/1
0.1 TABLET ORAL ONCE
Refills: 0 | Status: COMPLETED | OUTPATIENT
Start: 2024-11-26 | End: 2024-11-26

## 2024-11-26 RX ORDER — QUETIAPINE FUMARATE 200 MG/1
200 TABLET ORAL ONCE
Refills: 0 | Status: COMPLETED | OUTPATIENT
Start: 2024-11-26 | End: 2024-11-26

## 2024-11-26 RX ADMIN — Medication 0.25 MILLIGRAM(S): at 01:44

## 2024-11-26 RX ADMIN — QUETIAPINE FUMARATE 200 MILLIGRAM(S): 200 TABLET ORAL at 01:44

## 2024-11-26 RX ADMIN — CLONIDINE HYDROCHLORIDE 0.1 MILLIGRAM(S): 0.2 TABLET ORAL at 01:44

## 2024-11-26 RX ADMIN — Medication 50 MILLIGRAM(S): at 01:44

## 2024-11-26 RX ADMIN — APIXABAN 5 MILLIGRAM(S): 5 TABLET, FILM COATED ORAL at 01:45

## 2024-11-28 ENCOUNTER — NON-APPOINTMENT (OUTPATIENT)
Age: 39
End: 2024-11-28

## 2024-11-28 ENCOUNTER — EMERGENCY (EMERGENCY)
Facility: HOSPITAL | Age: 39
LOS: 1 days | Discharge: HOME | End: 2024-11-28
Attending: EMERGENCY MEDICINE
Payer: MEDICARE

## 2024-11-28 VITALS
HEIGHT: 71 IN | OXYGEN SATURATION: 98 % | WEIGHT: 293 LBS | RESPIRATION RATE: 16 BRPM | DIASTOLIC BLOOD PRESSURE: 80 MMHG | SYSTOLIC BLOOD PRESSURE: 110 MMHG | HEART RATE: 113 BPM | TEMPERATURE: 98 F

## 2024-11-28 VITALS
RESPIRATION RATE: 17 BRPM | OXYGEN SATURATION: 97 % | SYSTOLIC BLOOD PRESSURE: 115 MMHG | HEART RATE: 90 BPM | TEMPERATURE: 98 F | DIASTOLIC BLOOD PRESSURE: 82 MMHG

## 2024-11-28 DIAGNOSIS — Z90.49 ACQUIRED ABSENCE OF OTHER SPECIFIED PARTS OF DIGESTIVE TRACT: Chronic | ICD-10-CM

## 2024-11-28 LAB
ALBUMIN SERPL ELPH-MCNC: 3.6 G/DL — SIGNIFICANT CHANGE UP (ref 3.5–5)
ALP SERPL-CCNC: 67 U/L — SIGNIFICANT CHANGE UP (ref 40–120)
ALT FLD-CCNC: 52 U/L DA — SIGNIFICANT CHANGE UP (ref 10–60)
ANION GAP SERPL CALC-SCNC: 7 MMOL/L — SIGNIFICANT CHANGE UP (ref 5–17)
AST SERPL-CCNC: 21 U/L — SIGNIFICANT CHANGE UP (ref 10–40)
BILIRUB SERPL-MCNC: 0.2 MG/DL — SIGNIFICANT CHANGE UP (ref 0.2–1.2)
BUN SERPL-MCNC: 13 MG/DL — SIGNIFICANT CHANGE UP (ref 7–18)
CALCIUM SERPL-MCNC: 9.5 MG/DL — SIGNIFICANT CHANGE UP (ref 8.4–10.5)
CHLORIDE SERPL-SCNC: 110 MMOL/L — HIGH (ref 96–108)
CO2 SERPL-SCNC: 22 MMOL/L — SIGNIFICANT CHANGE UP (ref 22–31)
CREAT SERPL-MCNC: 0.86 MG/DL — SIGNIFICANT CHANGE UP (ref 0.5–1.3)
D DIMER BLD IA.RAPID-MCNC: 202 NG/ML DDU — SIGNIFICANT CHANGE UP
EGFR: 89 ML/MIN/1.73M2 — SIGNIFICANT CHANGE UP
EGFR: 89 ML/MIN/1.73M2 — SIGNIFICANT CHANGE UP
GLUCOSE SERPL-MCNC: 114 MG/DL — HIGH (ref 70–99)
HCG SERPL-ACNC: <1 MIU/ML — SIGNIFICANT CHANGE UP
NT-PROBNP SERPL-SCNC: 77 PG/ML — SIGNIFICANT CHANGE UP (ref 0–125)
POTASSIUM SERPL-MCNC: 4.4 MMOL/L — SIGNIFICANT CHANGE UP (ref 3.5–5.3)
POTASSIUM SERPL-SCNC: 4.4 MMOL/L — SIGNIFICANT CHANGE UP (ref 3.5–5.3)
PROT SERPL-MCNC: 7.3 G/DL — SIGNIFICANT CHANGE UP (ref 6–8.3)
SODIUM SERPL-SCNC: 139 MMOL/L — SIGNIFICANT CHANGE UP (ref 135–145)
TROPONIN I, HIGH SENSITIVITY RESULT: 3.2 NG/L — SIGNIFICANT CHANGE UP

## 2024-11-28 PROCEDURE — 71045 X-RAY EXAM CHEST 1 VIEW: CPT | Mod: 26

## 2024-11-28 PROCEDURE — 93010 ELECTROCARDIOGRAM REPORT: CPT

## 2024-11-28 PROCEDURE — 82962 GLUCOSE BLOOD TEST: CPT

## 2024-11-28 PROCEDURE — 99285 EMERGENCY DEPT VISIT HI MDM: CPT

## 2024-11-28 PROCEDURE — 80053 COMPREHEN METABOLIC PANEL: CPT

## 2024-11-28 PROCEDURE — 84484 ASSAY OF TROPONIN QUANT: CPT

## 2024-11-28 PROCEDURE — 99285 EMERGENCY DEPT VISIT HI MDM: CPT | Mod: 25

## 2024-11-28 PROCEDURE — 85379 FIBRIN DEGRADATION QUANT: CPT

## 2024-11-28 PROCEDURE — 71045 X-RAY EXAM CHEST 1 VIEW: CPT

## 2024-11-28 PROCEDURE — 36415 COLL VENOUS BLD VENIPUNCTURE: CPT

## 2024-11-28 PROCEDURE — 93005 ELECTROCARDIOGRAM TRACING: CPT

## 2024-11-28 PROCEDURE — 84702 CHORIONIC GONADOTROPIN TEST: CPT

## 2024-11-28 PROCEDURE — 83880 ASSAY OF NATRIURETIC PEPTIDE: CPT

## 2024-11-28 RX ORDER — ALPRAZOLAM 0.5 MG
0.25 TABLET, EXTENDED RELEASE 24 HR ORAL ONCE
Refills: 0 | Status: DISCONTINUED | OUTPATIENT
Start: 2024-11-28 | End: 2024-11-28

## 2024-11-28 RX ORDER — DIPHENHYDRAMINE HCL 12.5MG/5ML
50 ELIXIR ORAL ONCE
Refills: 0 | Status: COMPLETED | OUTPATIENT
Start: 2024-11-28 | End: 2024-11-28

## 2024-11-28 RX ADMIN — Medication 50 MILLIGRAM(S): at 06:57

## 2024-11-28 RX ADMIN — Medication 0.25 MILLIGRAM(S): at 08:47

## 2024-11-30 ENCOUNTER — EMERGENCY (EMERGENCY)
Facility: HOSPITAL | Age: 39
LOS: 1 days | Discharge: HOME | End: 2024-11-30
Attending: STUDENT IN AN ORGANIZED HEALTH CARE EDUCATION/TRAINING PROGRAM
Payer: MEDICARE

## 2024-11-30 ENCOUNTER — NON-APPOINTMENT (OUTPATIENT)
Age: 39
End: 2024-11-30

## 2024-11-30 VITALS
DIASTOLIC BLOOD PRESSURE: 84 MMHG | HEIGHT: 71 IN | RESPIRATION RATE: 17 BRPM | OXYGEN SATURATION: 99 % | TEMPERATURE: 98 F | HEART RATE: 88 BPM | WEIGHT: 293 LBS | SYSTOLIC BLOOD PRESSURE: 128 MMHG

## 2024-11-30 DIAGNOSIS — Z90.49 ACQUIRED ABSENCE OF OTHER SPECIFIED PARTS OF DIGESTIVE TRACT: Chronic | ICD-10-CM

## 2024-11-30 PROCEDURE — 93010 ELECTROCARDIOGRAM REPORT: CPT

## 2024-11-30 PROCEDURE — 99284 EMERGENCY DEPT VISIT MOD MDM: CPT | Mod: 25

## 2024-11-30 PROCEDURE — 94640 AIRWAY INHALATION TREATMENT: CPT

## 2024-11-30 PROCEDURE — 82962 GLUCOSE BLOOD TEST: CPT

## 2024-11-30 PROCEDURE — 93005 ELECTROCARDIOGRAM TRACING: CPT

## 2024-11-30 PROCEDURE — 99284 EMERGENCY DEPT VISIT MOD MDM: CPT

## 2024-11-30 PROCEDURE — 71045 X-RAY EXAM CHEST 1 VIEW: CPT | Mod: 26

## 2024-11-30 PROCEDURE — 71045 X-RAY EXAM CHEST 1 VIEW: CPT

## 2024-11-30 RX ORDER — MAGNESIUM, ALUMINUM HYDROXIDE 200-225/5
30 SUSPENSION, ORAL (FINAL DOSE FORM) ORAL ONCE
Refills: 0 | Status: DISCONTINUED | OUTPATIENT
Start: 2024-11-30 | End: 2024-12-03

## 2024-11-30 RX ORDER — IPRATROPIUM BROMIDE AND ALBUTEROL SULFATE 2.5; .5 MG/3ML; MG/3ML
3 SOLUTION RESPIRATORY (INHALATION) ONCE
Refills: 0 | Status: COMPLETED | OUTPATIENT
Start: 2024-11-30 | End: 2024-11-30

## 2024-11-30 RX ORDER — PREDNISONE 20 MG/1
50 TABLET ORAL ONCE
Refills: 0 | Status: COMPLETED | OUTPATIENT
Start: 2024-11-30 | End: 2024-11-30

## 2024-11-30 RX ORDER — ACETAMINOPHEN 500MG 500 MG/1
975 TABLET, COATED ORAL ONCE
Refills: 0 | Status: DISCONTINUED | OUTPATIENT
Start: 2024-11-30 | End: 2024-11-30

## 2024-11-30 RX ORDER — OXYCODONE AND ACETAMINOPHEN 5; 325 MG/1; MG/1
1 TABLET ORAL ONCE
Refills: 0 | Status: DISCONTINUED | OUTPATIENT
Start: 2024-11-30 | End: 2024-11-30

## 2024-11-30 RX ORDER — DIPHENHYDRAMINE HCL 25 MG
50 CAPSULE ORAL ONCE
Refills: 0 | Status: COMPLETED | OUTPATIENT
Start: 2024-11-30 | End: 2024-11-30

## 2024-11-30 RX ADMIN — IPRATROPIUM BROMIDE AND ALBUTEROL SULFATE 3 MILLILITER(S): 2.5; .5 SOLUTION RESPIRATORY (INHALATION) at 12:06

## 2024-11-30 RX ADMIN — OXYCODONE AND ACETAMINOPHEN 1 TABLET(S): 5; 325 TABLET ORAL at 13:30

## 2024-11-30 RX ADMIN — PREDNISONE 50 MILLIGRAM(S): 20 TABLET ORAL at 12:05

## 2024-11-30 RX ADMIN — Medication 50 MILLIGRAM(S): at 13:30

## 2024-11-30 RX ADMIN — IPRATROPIUM BROMIDE AND ALBUTEROL SULFATE 3 MILLILITER(S): 2.5; .5 SOLUTION RESPIRATORY (INHALATION) at 11:52

## 2024-11-30 RX ADMIN — IPRATROPIUM BROMIDE AND ALBUTEROL SULFATE 3 MILLILITER(S): 2.5; .5 SOLUTION RESPIRATORY (INHALATION) at 11:55

## 2024-11-30 NOTE — ED ADULT NURSE NOTE - OBJECTIVE STATEMENT
Patient chest pain and shortness of breath since last night, EMS gave 2 Duo neb Tx. Patient denies N/V/D.

## 2024-11-30 NOTE — ED PROVIDER NOTE - CLINICAL SUMMARY MEDICAL DECISION MAKING FREE TEXT BOX
38 y/o F  [place] [facility] ambulates with a walker, with PMH of Asthma,  Protein C resistance, Factor V Leiden deficiency (on Eliquis) c/b DVT/PE (2016), CVA (2019) w/ residual left sided weakness, seizure (on Depakote), hypothyroidism, HTN, T2DM, schizoaffective disorder, bipolar, borderline personality disorder, migraine, ?diverticulosis, GERD presenting with wheezing and chest tightness since last night  Diffuse expiratory wheezing  Suspect asthma exacerbation   Flu/Covid, CXR  Bronchodilators, Steroids  Reassess.

## 2024-11-30 NOTE — ED PROVIDER NOTE - NSFOLLOWUPINSTRUCTIONS_ED_ALL_ED_FT
1. Continue bronchodilators. Duonebs as needed every 6 hours  2. Prednisone 50 mg daily for the next four days  3. Seek Medical attention or return to the emergency department for any new or worsening symptoms.

## 2024-11-30 NOTE — ED PROVIDER NOTE - OBJECTIVE STATEMENT
40 y/o F  [place] [facility] ambulates with a walker, with PMH of Asthma,  Protein C resistance, Factor V Leiden deficiency (on Eliquis) c/b DVT/PE (2016), CVA (2019) w/ residual left sided weakness, seizure (on Depakote), hypothyroidism, HTN, T2DM, schizoaffective disorder, bipolar, borderline personality disorder, migraine, ?diverticulosis, GERD presenting with wheezing and chest tightness since last night  Given nebulizer treatment by EMS--->no improvement. Takes Duoneb PRN at NH. Patient currently on Eliquis for hx of PE/DVT. Denies any fevers/chills or recent URI symptoms.

## 2024-11-30 NOTE — ED PROVIDER NOTE - PATIENT PORTAL LINK FT
You can access the FollowMyHealth Patient Portal offered by [facility] by registering at the following website: [email or web address]. By joining [employer]’s FollowMyHealth portal, you will also be able to view your health information using other applications (apps) compatible with our system.

## 2024-12-04 NOTE — ED ADULT TRIAGE NOTE - PAIN: PRESENCE, MLM
empty stomach Orally Once a day    losartan (COZAAR) 25 MG tablet     Sig: Take 1 tablet by mouth daily    metoclopramide (REGLAN) 5 MG tablet     Sig: Take 1 tablet by mouth 2 times daily    montelukast (SINGULAIR) 10 MG tablet     Sig: Take 1 tablet by mouth nightly    Omega-3 Fatty Acids (FISH OIL) 1200 MG CAPS     Si,200 mg    pantoprazole (PROTONIX) 40 MG tablet     Sig: Take 1 tablet by mouth daily    rosuvastatin (CRESTOR) 5 MG tablet     Sig: Take 1 tablet by mouth Every Day    zinc 50 MG TABS tablet     Sig: Take 1 tablet by mouth Every Day    zolpidem (AMBIEN) 10 MG tablet     Sig: Take 1 tablet by mouth nightly as needed. Max Daily Amount: 10 mg         Electronically signed by Cornell Butt PA-C on 2024 at 8:26 PM    
complains of pain/discomfort

## 2024-12-08 ENCOUNTER — EMERGENCY (EMERGENCY)
Facility: HOSPITAL | Age: 39
LOS: 1 days | Discharge: HOME | End: 2024-12-08
Attending: EMERGENCY MEDICINE | Admitting: EMERGENCY MEDICINE
Payer: MEDICARE

## 2024-12-08 VITALS
HEART RATE: 101 BPM | HEIGHT: 71 IN | DIASTOLIC BLOOD PRESSURE: 81 MMHG | TEMPERATURE: 99 F | OXYGEN SATURATION: 97 % | SYSTOLIC BLOOD PRESSURE: 110 MMHG | RESPIRATION RATE: 16 BRPM

## 2024-12-08 DIAGNOSIS — Z90.49 ACQUIRED ABSENCE OF OTHER SPECIFIED PARTS OF DIGESTIVE TRACT: Chronic | ICD-10-CM

## 2024-12-08 LAB
ALBUMIN SERPL ELPH-MCNC: 4 G/DL — SIGNIFICANT CHANGE UP (ref 3.3–5)
ALP SERPL-CCNC: 80 U/L — SIGNIFICANT CHANGE UP (ref 40–120)
ALT FLD-CCNC: 45 U/L — HIGH (ref 4–33)
ANION GAP SERPL CALC-SCNC: 9 MMOL/L — SIGNIFICANT CHANGE UP (ref 7–14)
APTT BLD: 26.9 SEC — SIGNIFICANT CHANGE UP (ref 24.5–35.6)
AST SERPL-CCNC: 25 U/L — SIGNIFICANT CHANGE UP (ref 4–32)
BASOPHILS # BLD AUTO: 0.02 K/UL — SIGNIFICANT CHANGE UP (ref 0–0.2)
BASOPHILS # BLD AUTO: 0.03 K/UL — SIGNIFICANT CHANGE UP (ref 0–0.2)
BASOPHILS NFR BLD AUTO: 0.3 % — SIGNIFICANT CHANGE UP (ref 0–2)
BASOPHILS NFR BLD AUTO: 0.4 % — SIGNIFICANT CHANGE UP (ref 0–2)
BILIRUB SERPL-MCNC: <0.2 MG/DL — SIGNIFICANT CHANGE UP (ref 0.2–1.2)
BLD GP AB SCN SERPL QL: NEGATIVE — SIGNIFICANT CHANGE UP
BUN SERPL-MCNC: 15 MG/DL — SIGNIFICANT CHANGE UP (ref 7–23)
CALCIUM SERPL-MCNC: 9.1 MG/DL — SIGNIFICANT CHANGE UP (ref 8.4–10.5)
CHLORIDE SERPL-SCNC: 107 MMOL/L — SIGNIFICANT CHANGE UP (ref 98–107)
CO2 SERPL-SCNC: 25 MMOL/L — SIGNIFICANT CHANGE UP (ref 22–31)
CREAT SERPL-MCNC: 0.78 MG/DL — SIGNIFICANT CHANGE UP (ref 0.5–1.3)
EGFR: 100 ML/MIN/1.73M2 — SIGNIFICANT CHANGE UP
EOSINOPHIL # BLD AUTO: 0.05 K/UL — SIGNIFICANT CHANGE UP (ref 0–0.5)
EOSINOPHIL # BLD AUTO: 0.06 K/UL — SIGNIFICANT CHANGE UP (ref 0–0.5)
EOSINOPHIL NFR BLD AUTO: 0.7 % — SIGNIFICANT CHANGE UP (ref 0–6)
EOSINOPHIL NFR BLD AUTO: 0.8 % — SIGNIFICANT CHANGE UP (ref 0–6)
GLUCOSE SERPL-MCNC: 103 MG/DL — HIGH (ref 70–99)
HCT VFR BLD CALC: 31.7 % — LOW (ref 34.5–45)
HCT VFR BLD CALC: 31.9 % — LOW (ref 34.5–45)
HGB BLD-MCNC: 10.5 G/DL — LOW (ref 11.5–15.5)
HGB BLD-MCNC: 10.5 G/DL — LOW (ref 11.5–15.5)
IANC: 3.69 K/UL — SIGNIFICANT CHANGE UP (ref 1.8–7.4)
IANC: 4.09 K/UL — SIGNIFICANT CHANGE UP (ref 1.8–7.4)
IMM GRANULOCYTES NFR BLD AUTO: 1.3 % — HIGH (ref 0–0.9)
IMM GRANULOCYTES NFR BLD AUTO: 1.5 % — HIGH (ref 0–0.9)
INR BLD: 0.97 RATIO — SIGNIFICANT CHANGE UP (ref 0.85–1.16)
LYMPHOCYTES # BLD AUTO: 2.42 K/UL — SIGNIFICANT CHANGE UP (ref 1–3.3)
LYMPHOCYTES # BLD AUTO: 2.93 K/UL — SIGNIFICANT CHANGE UP (ref 1–3.3)
LYMPHOCYTES # BLD AUTO: 35.3 % — SIGNIFICANT CHANGE UP (ref 13–44)
LYMPHOCYTES # BLD AUTO: 37.2 % — SIGNIFICANT CHANGE UP (ref 13–44)
MAGNESIUM SERPL-MCNC: 2.1 MG/DL — SIGNIFICANT CHANGE UP (ref 1.6–2.6)
MCHC RBC-ENTMCNC: 30.7 PG — SIGNIFICANT CHANGE UP (ref 27–34)
MCHC RBC-ENTMCNC: 30.8 PG — SIGNIFICANT CHANGE UP (ref 27–34)
MCHC RBC-ENTMCNC: 32.9 G/DL — SIGNIFICANT CHANGE UP (ref 32–36)
MCHC RBC-ENTMCNC: 33.1 G/DL — SIGNIFICANT CHANGE UP (ref 32–36)
MCV RBC AUTO: 92.7 FL — SIGNIFICANT CHANGE UP (ref 80–100)
MCV RBC AUTO: 93.5 FL — SIGNIFICANT CHANGE UP (ref 80–100)
MONOCYTES # BLD AUTO: 0.57 K/UL — SIGNIFICANT CHANGE UP (ref 0–0.9)
MONOCYTES # BLD AUTO: 0.67 K/UL — SIGNIFICANT CHANGE UP (ref 0–0.9)
MONOCYTES NFR BLD AUTO: 8.3 % — SIGNIFICANT CHANGE UP (ref 2–14)
MONOCYTES NFR BLD AUTO: 8.5 % — SIGNIFICANT CHANGE UP (ref 2–14)
NEUTROPHILS # BLD AUTO: 3.69 K/UL — SIGNIFICANT CHANGE UP (ref 1.8–7.4)
NEUTROPHILS # BLD AUTO: 4.09 K/UL — SIGNIFICANT CHANGE UP (ref 1.8–7.4)
NEUTROPHILS NFR BLD AUTO: 51.8 % — SIGNIFICANT CHANGE UP (ref 43–77)
NEUTROPHILS NFR BLD AUTO: 53.9 % — SIGNIFICANT CHANGE UP (ref 43–77)
NRBC # BLD: 0 /100 WBCS — SIGNIFICANT CHANGE UP (ref 0–0)
NRBC # BLD: 0 /100 WBCS — SIGNIFICANT CHANGE UP (ref 0–0)
NRBC # FLD: 0 K/UL — SIGNIFICANT CHANGE UP (ref 0–0)
NRBC # FLD: 0 K/UL — SIGNIFICANT CHANGE UP (ref 0–0)
PHOSPHATE SERPL-MCNC: 2.7 MG/DL — SIGNIFICANT CHANGE UP (ref 2.5–4.5)
PLATELET # BLD AUTO: 169 K/UL — SIGNIFICANT CHANGE UP (ref 150–400)
PLATELET # BLD AUTO: 189 K/UL — SIGNIFICANT CHANGE UP (ref 150–400)
POTASSIUM SERPL-MCNC: 5.1 MMOL/L — SIGNIFICANT CHANGE UP (ref 3.5–5.3)
POTASSIUM SERPL-SCNC: 5.1 MMOL/L — SIGNIFICANT CHANGE UP (ref 3.5–5.3)
PROT SERPL-MCNC: 7.1 G/DL — SIGNIFICANT CHANGE UP (ref 6–8.3)
PROTHROM AB SERPL-ACNC: 11.5 SEC — SIGNIFICANT CHANGE UP (ref 9.9–13.4)
RBC # BLD: 3.41 M/UL — LOW (ref 3.8–5.2)
RBC # BLD: 3.42 M/UL — LOW (ref 3.8–5.2)
RBC # FLD: 14.9 % — HIGH (ref 10.3–14.5)
RBC # FLD: 15 % — HIGH (ref 10.3–14.5)
RH IG SCN BLD-IMP: POSITIVE — SIGNIFICANT CHANGE UP
SODIUM SERPL-SCNC: 141 MMOL/L — SIGNIFICANT CHANGE UP (ref 135–145)
TROPONIN T, HIGH SENSITIVITY RESULT: <6 NG/L — SIGNIFICANT CHANGE UP
WBC # BLD: 6.85 K/UL — SIGNIFICANT CHANGE UP (ref 3.8–10.5)
WBC # BLD: 7.88 K/UL — SIGNIFICANT CHANGE UP (ref 3.8–10.5)
WBC # FLD AUTO: 6.85 K/UL — SIGNIFICANT CHANGE UP (ref 3.8–10.5)
WBC # FLD AUTO: 7.88 K/UL — SIGNIFICANT CHANGE UP (ref 3.8–10.5)

## 2024-12-08 PROCEDURE — 36410 VNPNXR 3YR/> PHY/QHP DX/THER: CPT | Mod: GC

## 2024-12-08 PROCEDURE — 99285 EMERGENCY DEPT VISIT HI MDM: CPT | Mod: GC

## 2024-12-08 PROCEDURE — 93010 ELECTROCARDIOGRAM REPORT: CPT

## 2024-12-08 RX ORDER — IPRATROPIUM BROMIDE AND ALBUTEROL SULFATE 2.5; .5 MG/3ML; MG/3ML
3 SOLUTION RESPIRATORY (INHALATION) ONCE
Refills: 0 | Status: COMPLETED | OUTPATIENT
Start: 2024-12-08 | End: 2024-12-08

## 2024-12-08 RX ORDER — DIPHENHYDRAMINE HCL 25 MG
25 CAPSULE ORAL ONCE
Refills: 0 | Status: COMPLETED | OUTPATIENT
Start: 2024-12-08 | End: 2024-12-08

## 2024-12-08 RX ORDER — HALOPERIDOL 2 MG
5 TABLET ORAL ONCE
Refills: 0 | Status: DISCONTINUED | OUTPATIENT
Start: 2024-12-08 | End: 2024-12-09

## 2024-12-08 RX ORDER — KETOROLAC TROMETHAMINE 30 MG/ML
15 INJECTION INTRAMUSCULAR; INTRAVENOUS ONCE
Refills: 0 | Status: DISCONTINUED | OUTPATIENT
Start: 2024-12-08 | End: 2024-12-08

## 2024-12-08 RX ORDER — ACETAMINOPHEN 500MG 500 MG/1
975 TABLET, COATED ORAL ONCE
Refills: 0 | Status: COMPLETED | OUTPATIENT
Start: 2024-12-08 | End: 2024-12-08

## 2024-12-08 RX ORDER — METHYLPREDNISOLONE SOD SUCC 125 MG
125 VIAL (EA) INJECTION ONCE
Refills: 0 | Status: COMPLETED | OUTPATIENT
Start: 2024-12-08 | End: 2024-12-08

## 2024-12-08 RX ORDER — LORAZEPAM 2 MG/1
4 TABLET ORAL ONCE
Refills: 0 | Status: DISCONTINUED | OUTPATIENT
Start: 2024-12-08 | End: 2024-12-09

## 2024-12-08 RX ADMIN — Medication 25 MILLIGRAM(S): at 21:58

## 2024-12-08 RX ADMIN — IPRATROPIUM BROMIDE AND ALBUTEROL SULFATE 3 MILLILITER(S): 2.5; .5 SOLUTION RESPIRATORY (INHALATION) at 20:50

## 2024-12-08 RX ADMIN — KETOROLAC TROMETHAMINE 15 MILLIGRAM(S): 30 INJECTION INTRAMUSCULAR; INTRAVENOUS at 21:58

## 2024-12-08 RX ADMIN — Medication 25 MILLIGRAM(S): at 23:46

## 2024-12-08 RX ADMIN — Medication 125 MILLIGRAM(S): at 22:39

## 2024-12-08 NOTE — ED PROVIDER NOTE - PATIENT PORTAL LINK FT
You can access the FollowMyHealth Patient Portal offered by [facility] by registering at the following website: [email or web address]. By joining [employer]’s FollowMyHealth portal, you will also be able to view your health information using other applications (apps) compatible with our system.

## 2024-12-08 NOTE — ED ADULT TRIAGE NOTE - CHIEF COMPLAINT QUOTE
pt had 1 episodes of blood in stool pt on elequis for clotting disorder/ pt has hx colitis denies abd pain

## 2024-12-08 NOTE — ED PROVIDER NOTE - PHYSICAL EXAMINATION
Vital signs reviewed.  CONSTITUTIONAL: NAD   HEAD: Normocephalic; atraumatic  EYES: EOMI, PERRL, no conjunctival injection, no scleral icterus  MOUTH/THROAT:  MMM  NECK: Trachea midline, no JVD  CV: Normal S1, S2; no audible murmurs  RESP: normal work of breathing; CTAB   ABD: soft, non-distended; non-tender to palpation   : refused rectal exam, understanding risk and benefits   MSK/EXT: no LE edema, no limited ROM  SKIN: No rashes on exposed skin surfaces  NEURO: Moves all extremities spontaneously with no focal deficits, speech is appropriate  PSYCH: flat affect, interactive

## 2024-12-08 NOTE — ED PROVIDER NOTE - CLINICAL SUMMARY MEDICAL DECISION MAKING FREE TEXT BOX
40 y/o F  [place] [facility] ambulates with a walker, with PMH of Asthma,  Protein C resistance, Factor V Leiden deficiency (on Eliquis) c/b DVT/PE (2016), CVA (2019) w/ residual left sided weakness, seizure (on Depakote), hypothyroidism, HTN, T2DM, schizoaffective disorder, bipolar, borderline personality disorder, migraine, ?diverticulosis, GERD presenting with blood in stool 38 y/o F  [place] [facility] ambulates with a walker, with PMH of Asthma,  Protein C resistance, Factor V Leiden deficiency (on Eliquis) c/b DVT/PE (2016), CVA (2019) w/ residual left sided weakness, seizure (on Depakote), hypothyroidism, HTN, T2DM, schizoaffective disorder, bipolar, borderline personality disorder, migraine, ?diverticulosis, GERD presenting with blood in stool since october this year. 40 y/o F  [place] [facility] ambulates with a walker, with PMH of Asthma,  Protein C resistance, Factor V Leiden deficiency (on Eliquis) c/b DVT/PE (2016), CVA (2019) w/ residual left sided weakness, seizure (on Depakote), hypothyroidism, HTN, T2DM, schizoaffective disorder, bipolar, borderline personality disorder, migraine, ?diverticulosis, GERD presenting with blood in stool since october this year when she was dx w/ colitis w/ similar symtoms. Patient states she was transferred from nursing home to [facility] 2 days ago. symptom has been unchanged for the last 2 months but was sent to the ED due to concern from [facility] staff.  States blood in the stool seen about twice per week associated with intermittent chest pain, lightheadedness chest pain, lightheadedness.  Currently denies shortness of breath, fever, chills, nausea/vomiting, diarrhea, constipation, abdominal pain, dysuria, hematuria, urinary frequency/urgency.  Notes colonoscopy was done March of this year, results of which was reportedly normal. broad pictures on the phone which showed blood in toilet bowl from earlier today.  Vital signs upon presentation nonactionable.  Exam as above, unremarkable abdomen, no tenderness to palpation.  Patient refused a rectal exam, understanding of risk and benefits.  No clinical signs or symptoms of anemia.  Presentation concerning for lower GI bleed possibly from known colitis versus diverticulosis versus AVM versus others.  Will obtain labs to evaluate for anemia, coag   Given known coagulopathy,  dispo likely return to retirement with outpatient GI follow-up. 38 y/o F  [place] [facility] ambulates with a walker, with PMH of Asthma,  Protein C resistance, Factor V Leiden deficiency (on Eliquis) c/b DVT/PE (2016), CVA (2019) w/ residual left sided weakness, seizure (on Depakote), hypothyroidism, HTN, T2DM, schizoaffective disorder, bipolar, borderline personality disorder, migraine, ?diverticulosis, GERD presenting with blood in stool since october this year when she was dx w/ colitis w/ similar symtoms. Patient states she was transferred from nursing home to [facility] 2 days ago. symptom has been unchanged for the last 2 months but was sent to the ED due to concern from group home staff.  States blood in the stool seen about twice per week associated with intermittent chest pain, lightheadedness chest pain, lightheadedness.  Currently denies shortness of breath, fever, chills, nausea/vomiting, diarrhea, constipation, abdominal pain, dysuria, hematuria, urinary frequency/urgency.  Notes colonoscopy was done March of this year, results of which was reportedly normal. broad pictures on the phone which showed blood in toilet bowl from earlier today.  Vital signs upon presentation nonactionable.  Exam as above, unremarkable abdomen, no tenderness to palpation.  Patient refused a rectal exam, understanding of risk and benefits.  No clinical signs or symptoms of anemia.  Presentation concerning for lower GI bleed possibly from known colitis versus diverticulosis versus AVM versus others.  Will obtain labs to evaluate for anemia, coag   Given known coagulopathy,  dispo likely return to [facility] with outpatient GI follow-up.    Aria Bass MD attending physician. Patient was a signout to me for rectal bleeding.  She has remained hemodynamically stable during the 4-hour she has been here.  Her CBC shows a hemoglobin that is essentially being on the same as it was a week ago when she was here on 25 November her abdomen is soft no rebound no guarding no tenderness she has to follow-up outpatient with GI for this ongoing problem.      Patient then adds she would like some Percocet for her chest pain.  Chest pain was not part of her complaint system when she first came in she says this is her ongoing medications.  We evaluated I-STOP and found that the last time that she was prescribed Percocet was in August and it was for short dose.  She says she was receiving it from her nursing home which she left 5 days ago.  She has a complex care note that identifies that she has pain med seeking in the past.  I offered that she could be discharged and follow-up with her providers and/or have the Percocet at home she then added that she did not have any Percocet at home.  She then said she would only want a dose of Percocet I said I am more than happy to give her Toradol or IV Tylenol both of which she is declining.  She wants to speak to someone who is seen her to me and I am involving miesha the nurse manager    sara: Patient comes to the ED for rectal bleeding.  She has multiple visits in the past she said that she has recently just left her nursing home facility and is now in a group home.  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  Also history of colitis      She showed a picture of the blood in the stool where it was brown stool surrounded by blood in the toilet to the resident.  Otherwise denies shortness of breath nausea vomiting diarrhea constipation abdominal pain dysuria.  She had a colonoscopy done in March which reportedly was normal    Vital signs upon presentation were reasonable abdomen soft no rebound no guarding no tenderness patient moving all extremities  Patient declined rectal exam    Patient here with soft abdomen blood around the stool has a hemoglobin that is same that it was a week ago and is hemodynamically stable for the last 4 hours reasonable for the patient to follow-up we did offer a repeat CBC at 4 hours and she is considering whether she wants that done

## 2024-12-08 NOTE — ED PROVIDER NOTE - PROGRESS NOTE DETAILS
Jaspreet LINARES PGY1: Patient signed out to me, currently feeling well, reporting no changes in her chronic blood in stool, no pain, reporting some chronic chest pain that may be related to her asthma, will give breathing treatment. plan for 4hr repeat hemoglobin and likely DC with GI follow up if stable. Aria Bass MD attending physician. Patient was a signout to me for rectal bleeding.  She has remained hemodynamically stable during the 4-hour she has been here.  Her CBC shows a hemoglobin that is essentially being on the same as it was a week ago when she was here on 25 November her abdomen is soft no rebound no guarding no tenderness she has to follow-up outpatient with GI for this ongoing problem.      Patient then adds she would like some Percocet for her chest pain.  Chest pain was not part of her complaint system when she first came in she says this is her ongoing medications.  We evaluated I-STOP and found that the last time that she was prescribed Percocet was in August and it was for short dose.  She says she was receiving it from her nursing home which she left 5 days ago.  She has a complex care note that identifies that she has pain med seeking in the past.  I offered that she could be discharged and follow-up with her providers and/or have the Percocet at home she then added that she did not have any Percocet at home.  She then said she would only want a dose of Percocet I said I am more than happy to give her Toradol or IV Tylenol both of which she is declining.  She wants to speak to someone who is seen her to me and I am involving miesha the nurse manager Jaspreet LINARES PGY1: Patient requested IV toradol and IV benadryl for her pain, which she says has been helpful in the past. Afterwards she became itchy, which she has happens to her sometimes, but there are no hives Jaspreet LINARES PGY1: Patient requested IV toradol and IV benadryl for her pain, which she says has been helpful in the past. Afterwards she became itchy, which she has happens to her sometimes, but there are no hives. She then became agitated because she was not given the percocet that she says that she normally takes, despite ISTOP showing no prescription. She became more agitated and started pulling at objects on the wall and was not able to be redirected. She was medically cleared for discharge, but because of her agitation and inability to be redirected presented a risk to the safety of herself and others. She was moved to the  area and refused to follow protocol and change out of her clothing, despite being told that the alternative was to be medicated if she did no cooperate. She continued to refuse to change and thorazine and ativan was ordered, in addition ot the previous haldol and benadryl. sara: Patient became disruptive and identified that it was because "you did not give me what I wanted".  She reported that she takes Percocet for her chest pain and that she always gets it.  We evaluated I stopped there is no ongoing prescription for Percocet.  She does have a complex care note that does demonstrate that she has issues with being manipulative and trying to get pain medications.  Patient was being discharged but she was disruptive and was removing things from the wall in the room where she was exhibiting poor insight.  She threatened to punch the writer. As she moved to a new place of residence that is a group home social work needed to be involved to evaluate for transportation home.  Patient was unable to cooperate with that activity and became so disruptive that it was necessary to involve calling security.  She was escorted to  was asked to undress and change into the gown that they have in .  She refused.  It was made clear to her that if she was not able to cooperate then she would unfortunately have to be medicated which would slow down her discharge which was not the goal of any of us.    At that point Thorazine and Ativan was ordered for the patient.    She was signed out to Dr. Hart to arrange for what ever method of transportation via social work when the patient was ready to be discharged Tanner PINA: Pt signed out to me.  She was medicated for agitated behaviors earlier in the evening and moved to .  She slept overnight without issues.  Overnight SW attempted to contact collateral to confirm pt's residence.  Per [facility] where she previously resided, pt was discharged from their facility on 12/6, but was unable to tell us where.  Per EMS run sheet, pt was picked up from [address] [place] [address].  Pt held in  overnight for SW in AM to attempt to reach out again for collateral/determine residence for safe discharge.  Upon awakening this morning, pt states she feels anxious and is requesting IM medications.  Will give IM versed for anxiolysis, pending SW assistance for dispo to outpatient residence. Alana PINA: Received sign out from my colleague Dr. Hart pt seen overnight for c/f rectal bleeding, medically cleared, Anitia from  was able to facilitate safe discharge back to group home Pt assessed at beside. Pt resting comfortably, pain controlled, pt questions answered. Vital signs stable.  stable for dc.

## 2024-12-08 NOTE — ED PROVIDER NOTE - ATTENDING CONTRIBUTION TO CARE
janny: Patient comes to the ED for rectal bleeding.  She has multiple visits in the past she said that she has recently just left her nursing home facility and is now in a group home.  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  Also history of colitis. She also has a history of explosive personality disorder      She showed a picture of the blood in the stool where it was brown stool surrounded by blood in the toilet to the resident.  Otherwise denies shortness of breath nausea vomiting diarrhea constipation abdominal pain dysuria.  She had a colonoscopy done in March which reportedly was normal    Vital signs upon presentation were reasonable abdomen soft no rebound no guarding no tenderness patient moving all extremities  Patient declined rectal exam    Patient here with soft abdomen blood around the stool has a hemoglobin that is same that it was a week ago and is hemodynamically stable for the last 4 hours reasonable for the patient to follow-up we did offer a repeat CBC at 4 hours and she is considering whether she wants that done    I performed a history and physical exam of the patient and discussed their management with the resident and /or advanced care provider. I personally made/approved the management plan and take responsibility for the patient management. I reviewed the resident and /or ACP's note and agree with the documented findings and plan of care. My medical decison making and observations are found above.

## 2024-12-08 NOTE — ED PROVIDER NOTE - NSFOLLOWUPINSTRUCTIONS_ED_ALL_ED_FT
You came to the emergency department for rectal bleeding.  We found that your blood counts are the same as they were a week ago your vital signs were all reasonable.  What we can do for you in the emergency department is limited you do not need to have a transfusion but you do need to follow-up with your GI doctor.  We appreciate that you had a negative colonoscopy Aria Bass MD attending physician but there are other things that can cause bleeding such as hemorrhoids.    If you have any worsening return to the emergency department    He also mentions that you take Percocet you need to follow that up with an outpatient physician as well

## 2024-12-09 VITALS
RESPIRATION RATE: 16 BRPM | OXYGEN SATURATION: 99 % | HEART RATE: 92 BPM | TEMPERATURE: 98 F | SYSTOLIC BLOOD PRESSURE: 105 MMHG | DIASTOLIC BLOOD PRESSURE: 64 MMHG

## 2024-12-09 RX ORDER — LORAZEPAM 2 MG/1
2 TABLET ORAL ONCE
Refills: 0 | Status: DISCONTINUED | OUTPATIENT
Start: 2024-12-09 | End: 2024-12-09

## 2024-12-09 RX ADMIN — Medication 100 MILLIGRAM(S): at 00:51

## 2024-12-09 RX ADMIN — Medication 5 MILLIGRAM(S): at 07:42

## 2024-12-09 RX ADMIN — LORAZEPAM 2 MILLIGRAM(S): 2 TABLET ORAL at 00:50

## 2024-12-09 NOTE — ED ADULT TRIAGE NOTE - BMI (KG/M2)

## 2024-12-09 NOTE — ED BEHAVIORAL HEALTH NOTE - BEHAVIORAL HEALTH NOTE
Worker met with patient at bedside who states that she called ems for physical complaints. She reports she resides at [address] [place] [address], [facility]. in a group home called [facility]. She states that they are aware that she is in the ED. Worker spoke with pt’s  Ms. Flanagan who confirms patient’s residence is [facility]. Worker called [facility] and spoke to Sigrid [phone] who provided nurse number.  worker called nurse [phone] who states that patient is able to return if discharge. He states that the patient has bleeding in rectum since yesterday. She was in rehab from middle to September to last week Friday. When she came back her medications was changed. She appeared to be in good mood. He states that if patient is cleared to  – [phone] Worker met with patient at bedside who states that she called ems for physical complaints. She reports she resides at [address] [place] [address], apt . in a group home called community "[facility]. She states that they are aware that she is in the ED. Worker spoke with pt’s  Ms. Flanagan who confirms patient’s residence is [facility]. Worker called [facility] and spoke to Sigrid [phone] who provided nurse number.  worker called nurse [phone] who states that patient is able to return if discharge. He states that the patient has bleeding in rectum since yesterday. She was in rehab from middle to September to last week Friday. When she came back her medications was changed. She appeared to be in good mood. He states that if patient is cleared to  – [phone].     Per provider, patient is cleared and is able to return to their previous residence, [facility] .  has spoken to  [facility] manager ([phone]),  confirmed that patients mode of transportation is AMBULANCE and that patient travels WITH [facility] ems . Clinical provider is in agreement with  AMBULANCE back to [facility]. Verbal huddle regarding coordination of care completed with interdisciplinary team.  worker called [facility] ems and spoke to osmar who arranged trip back to group home.  trip # [id number]   # with in the hour

## 2024-12-09 NOTE — ED ADULT NURSE REASSESSMENT NOTE - NS ED NURSE REASSESS COMMENT FT1
8:30 -10:40am: Patient offered and accepted breakfast, reevaluated and discharged back to Group Home via [facility] with personal belongings. Patient is calm and cooperative.  ALEYDA Estrada
Break covering RN: patient received, from room 11, as per charge RN, patient was screaming, not redirectable. breaking items within room 11 brought to  for safety concerns. Patient cooperative at this time, being changed by female security, brought back to , meds not given at this time, will continue to monitor.
Pt became disruptive and started to trash her room, pt took all boxes of gloves out of the room and threw them on the ground, pt also ripped the oxygen connection from the wall in room 11, pt threw some other items on the ground, pt was verbally threatening towards staff, pt stated "i'm about to punch that doctor in her face", pt sent to  escorted by HP and PCA. IV line in right AC removed.
Pt became increasingly agitated, began acting out, hitting self in the head, banging head against the wall, punching the wall. Pt not able to be verbally deescalated despite several attempts. MD Hart made aware, pt medicated as per MAR
Pt sleeping comfortably in bed, respirations are even and unlabored, no acute distress noted. SW actively trying to locate pt current residence for discharge.
Pt woke up from sleep endorsing that they are having worsening anxiety and are requesting medication via IM. MD Hart made aware, pt medicated as per MD order
Pt sleeping comfortably in bed, respirations are even and unlabored, vitals as charted. Pt pending social work

## 2024-12-09 NOTE — ED BEHAVIORAL HEALTH NOTE - BEHAVIORAL HEALTH NOTE
From: Mando Search  To: Placido Lucas  Sent: 6/26/2022 11:26 PM CDT  Subject: Increase dose    Hi, I wanted to ask if we could up the dose on the stretta? I have never felt better on a medication like thisâ¦I wish I could of been on this sooner :) but I see myself lacking again (not as bad as before when I was just on 40mg) in staying focused and staying on track. I feel wonderful on this med but I see myself lacking what it was helping with in the beginning but not now.  Thank you carole Worker psykes patient and printed for provider. Worker called numbers listed in chart [phone] and number no voicemail left. Patient then called [phone] and left message for call back. Worker called [facility] [phone] and spoke to carmelina, and he states that he will send  a message. He is unsure if patient is linked to their care.  Worker called back [facility] ([phone]) and no one answers. Worker called numerous times and continues to get transferred.

## 2024-12-09 NOTE — PROVIDER CONTACT NOTE (OTHER) - ASSESSMENT
When the writer reached out to several numbers on the patient chart, no one answered. [phone], [phone], and [phone]. The writer informed the provider of the matter.

## 2024-12-11 ENCOUNTER — EMERGENCY (EMERGENCY)
Facility: HOSPITAL | Age: 39
LOS: 1 days | Discharge: HOME | End: 2024-12-11
Attending: STUDENT IN AN ORGANIZED HEALTH CARE EDUCATION/TRAINING PROGRAM | Admitting: STUDENT IN AN ORGANIZED HEALTH CARE EDUCATION/TRAINING PROGRAM
Payer: MEDICARE

## 2024-12-11 VITALS
HEART RATE: 103 BPM | DIASTOLIC BLOOD PRESSURE: 87 MMHG | TEMPERATURE: 98 F | OXYGEN SATURATION: 99 % | HEIGHT: 71 IN | SYSTOLIC BLOOD PRESSURE: 123 MMHG | RESPIRATION RATE: 18 BRPM | WEIGHT: 293 LBS

## 2024-12-11 DIAGNOSIS — Z90.49 ACQUIRED ABSENCE OF OTHER SPECIFIED PARTS OF DIGESTIVE TRACT: Chronic | ICD-10-CM

## 2024-12-11 PROCEDURE — 93010 ELECTROCARDIOGRAM REPORT: CPT

## 2024-12-11 PROCEDURE — 99285 EMERGENCY DEPT VISIT HI MDM: CPT | Mod: GC

## 2024-12-11 RX ORDER — PREDNISONE 20 MG/1
50 TABLET ORAL ONCE
Refills: 0 | Status: COMPLETED | OUTPATIENT
Start: 2024-12-11 | End: 2024-12-11

## 2024-12-11 RX ORDER — DIPHENHYDRAMINE HCL 12.5MG/5ML
25 ELIXIR ORAL ONCE
Refills: 0 | Status: COMPLETED | OUTPATIENT
Start: 2024-12-11 | End: 2024-12-11

## 2024-12-11 RX ORDER — ACETAMINOPHEN 500 MG/5ML
650 LIQUID (ML) ORAL ONCE
Refills: 0 | Status: COMPLETED | OUTPATIENT
Start: 2024-12-11 | End: 2024-12-11

## 2024-12-11 RX ADMIN — PREDNISONE 50 MILLIGRAM(S): 20 TABLET ORAL at 23:34

## 2024-12-11 RX ADMIN — Medication 25 MILLIGRAM(S): at 23:34

## 2024-12-11 RX ADMIN — Medication 20 MILLIGRAM(S): at 23:34

## 2024-12-11 RX ADMIN — Medication 650 MILLIGRAM(S): at 23:34

## 2024-12-12 ENCOUNTER — EMERGENCY (EMERGENCY)
Facility: HOSPITAL | Age: 39
LOS: 1 days | Discharge: HOME | End: 2024-12-12
Attending: STUDENT IN AN ORGANIZED HEALTH CARE EDUCATION/TRAINING PROGRAM
Payer: MEDICARE

## 2024-12-12 ENCOUNTER — NON-APPOINTMENT (OUTPATIENT)
Age: 39
End: 2024-12-12

## 2024-12-12 VITALS
DIASTOLIC BLOOD PRESSURE: 86 MMHG | HEIGHT: 71 IN | OXYGEN SATURATION: 100 % | RESPIRATION RATE: 16 BRPM | TEMPERATURE: 98 F | HEART RATE: 97 BPM | SYSTOLIC BLOOD PRESSURE: 122 MMHG | WEIGHT: 293 LBS

## 2024-12-12 VITALS
HEART RATE: 88 BPM | RESPIRATION RATE: 18 BRPM | SYSTOLIC BLOOD PRESSURE: 124 MMHG | TEMPERATURE: 98 F | OXYGEN SATURATION: 100 % | DIASTOLIC BLOOD PRESSURE: 88 MMHG

## 2024-12-12 DIAGNOSIS — Z90.49 ACQUIRED ABSENCE OF OTHER SPECIFIED PARTS OF DIGESTIVE TRACT: Chronic | ICD-10-CM

## 2024-12-12 PROCEDURE — 99284 EMERGENCY DEPT VISIT MOD MDM: CPT

## 2024-12-12 PROCEDURE — 93010 ELECTROCARDIOGRAM REPORT: CPT

## 2024-12-12 PROCEDURE — 99285 EMERGENCY DEPT VISIT HI MDM: CPT | Mod: 25

## 2024-12-12 PROCEDURE — 82962 GLUCOSE BLOOD TEST: CPT

## 2024-12-12 PROCEDURE — 93005 ELECTROCARDIOGRAM TRACING: CPT

## 2024-12-12 PROCEDURE — 73502 X-RAY EXAM HIP UNI 2-3 VIEWS: CPT

## 2024-12-12 PROCEDURE — 99283 EMERGENCY DEPT VISIT LOW MDM: CPT | Mod: 25

## 2024-12-12 PROCEDURE — 73502 X-RAY EXAM HIP UNI 2-3 VIEWS: CPT | Mod: 26,LT

## 2024-12-12 RX ORDER — DIPHENHYDRAMINE HCL 25 MG
50 CAPSULE ORAL ONCE
Refills: 0 | Status: COMPLETED | OUTPATIENT
Start: 2024-12-12 | End: 2024-12-12

## 2024-12-12 RX ORDER — DIPHENHYDRAMINE HCL 25 MG
50 CAPSULE ORAL ONCE
Refills: 0 | Status: DISCONTINUED | OUTPATIENT
Start: 2024-12-12 | End: 2024-12-12

## 2024-12-12 RX ORDER — CHLORPROMAZINE HCL 10 MG
50 TABLET ORAL ONCE
Refills: 0 | Status: COMPLETED | OUTPATIENT
Start: 2024-12-12 | End: 2024-12-12

## 2024-12-12 RX ORDER — DIPHENHYDRAMINE HCL 12.5MG/5ML
25 ELIXIR ORAL ONCE
Refills: 0 | Status: COMPLETED | OUTPATIENT
Start: 2024-12-12 | End: 2024-12-12

## 2024-12-12 RX ORDER — DIPHENHYDRAMINE HCL 12.5MG/5ML
50 ELIXIR ORAL ONCE
Refills: 0 | Status: COMPLETED | OUTPATIENT
Start: 2024-12-12 | End: 2024-12-12

## 2024-12-12 RX ORDER — CYCLOBENZAPRINE HCL 10 MG
1 TABLET ORAL
Qty: 14 | Refills: 0
Start: 2024-12-12 | End: 2024-12-25

## 2024-12-12 RX ORDER — OXYCODONE AND ACETAMINOPHEN 5; 325 MG/1; MG/1
1 TABLET ORAL
Qty: 4 | Refills: 0
Start: 2024-12-12 | End: 2024-12-12

## 2024-12-12 RX ORDER — LORAZEPAM 4 MG/ML
2 VIAL (ML) INJECTION ONCE
Refills: 0 | Status: DISCONTINUED | OUTPATIENT
Start: 2024-12-12 | End: 2024-12-12

## 2024-12-12 RX ORDER — DIPHENHYDRAMINE HCL 25 MG
25 CAPSULE ORAL ONCE
Refills: 0 | Status: DISCONTINUED | OUTPATIENT
Start: 2024-12-12 | End: 2024-12-12

## 2024-12-12 RX ORDER — HALOPERIDOL 10 MG/1
5 TABLET ORAL ONCE
Refills: 0 | Status: COMPLETED | OUTPATIENT
Start: 2024-12-12 | End: 2024-12-12

## 2024-12-12 RX ADMIN — Medication 50 MILLIGRAM(S): at 09:00

## 2024-12-12 RX ADMIN — Medication 25 MILLIGRAM(S): at 01:32

## 2024-12-12 RX ADMIN — HALOPERIDOL 5 MILLIGRAM(S): 10 TABLET ORAL at 02:45

## 2024-12-12 RX ADMIN — Medication 25 MILLIGRAM(S): at 09:00

## 2024-12-12 RX ADMIN — Medication 50 MILLIGRAM(S): at 02:45

## 2024-12-12 RX ADMIN — Medication 50 MILLIGRAM(S): at 23:14

## 2024-12-12 RX ADMIN — Medication 50 MILLIGRAM(S): at 02:19

## 2024-12-12 RX ADMIN — Medication 2 MILLIGRAM(S): at 02:20

## 2024-12-12 NOTE — ED PROVIDER NOTE - NSFOLLOWUPINSTRUCTIONS_ED_ALL_ED_FT
You have been evaluated in the Emergency Department today for leg pain. Your evaluation did not find evidence of medical conditions requiring emergent intervention at this time.    We recommend you take 600mg ibuprofen every 6 hours or Tylenol 650mg every 6 hours as needed for pain. If needed, you can alternate these medications so that you take one medication every 3 hours. For instance, at noon take ibuprofen, then at 3pm take Tylenol, then at 6pm take ibuprofen.    Please schedule an appointment for follow up with your primary care physician this week.    Return to the Emergency Department if you experience worsening pain, numbness, tingling, change of color in your toes, or any other concerning symptoms.    Thank you for choosing us for your care.

## 2024-12-12 NOTE — ED PROVIDER NOTE - OBJECTIVE STATEMENT
30-year-old morbidly obese female with multiple psychiatric and medical comorbidities presents from [facility].  Patient initially reported chest pain in triage.  At the bedside states she is having leg pain that she describes as sharp radiating down her leg.  Reports the pain started while laying down at home.  Patient requesting pain medication.  Denies trauma numbness or weakness.    General: NAD, morbidly obese.   HEENT: EOMI. Anicteric sclera. PERRLA. NCAT. No JVD.  Heart/Lungs: RRR, CTAB.  Chest/Back: No asymmetry. No CVAT.  Abd: Soft, NT,ND.  Neuro: No focal deficits.  Skin: No rashes. Normal color for race.  Psych: Calm.

## 2024-12-12 NOTE — ED PROVIDER NOTE - PROGRESS NOTE DETAILS
30-year-old morbidly obese female with multiple psychiatric and medical comorbidities presents from [facility].  Patient initially reported chest pain in triage.  At the bedside states she is having leg pain that she describes as sharp radiating down her leg.  Reports the pain started while laying down at home.  Patient requesting pain medication.  Denies trauma numbness or weakness. EMR REVIEW:  EMR review indicates patient has complex care issues: PMHx: Factor V Leiden Mutation dx in 2016 with multiple DVTs/PE; Psychogenic nonepileptic seizures, Viitamin D deficiency, hx of DVT and PE, IBS, GERD, PID, endometriosis, headache syndrome, asthma, hypothyroidism.

## 2024-12-12 NOTE — ED PROVIDER NOTE - PATIENT PORTAL LINK FT
You can access the FollowMyHealth Patient Portal offered by [facility] by registering at the following website: [email or web address]. By joining [employer]’s FollowMyHealth portal, you will also be able to view your health information using other applications (apps) compatible with our system.

## 2024-12-12 NOTE — ED PROVIDER NOTE - CLINICAL SUMMARY MEDICAL DECISION MAKING FREE TEXT BOX
Patient has no evidence of fracture.  Patient is ambulatory and tolerating food by mouth.  Patient has had multiple complaints while in the ER.  There is no evidence of an acute emergency at this time.  Pain is controlled.  Patient to be discharged to group home in [place].

## 2024-12-12 NOTE — ED PROVIDER NOTE - QRS
CHIEF COMPLAINT:    Chief Complaint   Patient presents with   • Follow-up     Diabetes       SUBJECTIVE:  Dustin Hitchcock is a 51 year old male who presents for follow up of the following:    Diabetes mellitus type 2 for which he is on therapy with nesina, metformin and glipizide..  No episodes of hypoglycemia or loss of consciousness. No side effects of medication such as nausea, vomiting, or myalgias.  Compliance with medication is good.  Recent hemoglobin A1c is 8.3, down from 13 3 months prior.  No visual disturbance or numbness, tingling, or pain in the feet.  Checking glucose levels daily which have ranged 130-175.    Hyperlipidemia: noted improvement with pravachol therapy, -88.  He does describe some intermittent muscle discomfort, particularly in his quads though admits this feels like a \"tightness\" and that he should stretch more often.       Review of systems:    Constitutional: Negative for fever and chills.   Skin: Negative for rash.   HEENT: Negative for ear pain or sore throat.  Respiratory: Negative cough or shortness of breath.    Cardiovascular: Negative for chest pain or chest pressure.   Gastrointestinal: Negative for nausea, vomiting, diarrhea or abdominal pain.   Genitourinary: Negative for dysuria, urgency or frequency.  Extremities:  Negative for joint swelling or joint pain.  All other systems are reviewed and are negative except as documented in the HPI.       OBJECTIVE:  PROBLEM LIST:    Patient Active Problem List   Diagnosis   • Type 2 diabetes mellitus without complication, without long-term current use of insulin (CMS/[facility])   • Mixed hyperlipidemia   • Essential hypertension       PAST HISTORIES:  I have reviewed the past medical history, medications and allergies listed in the medical record as obtained by my nursing staff and support staff and agree with their documentation.      Physical Exam:    Vital Signs:    Visit Vitals  BP (!) 160/100 (BP Location: [facility], Patient  Position: Sitting, Cuff Size: Regular)   Pulse 96   Temp 97.6 °F (36.4 °C) (Oral)   Ht 6' 1\" (1.854 m)   Wt 101.8 kg   SpO2 96%   BMI 29.61 kg/m²     General:  Alert, cooperative, conversive, in no acute distress.  Skin:  Warm and dry without rash.    Head:  Normocephalic-atraumatic.   Neck:  Trachea is midline.   Eyes:  Normal conjunctivae and sclerae.    ENT:  Oral mucous membranes are moist.    Cardiovascular:  Regular rate and rhythm without murmur.  Respiratory:  Normal respiratory effort.  CTA (clear to auscultation).  No wheezes, rales or rhonchi.  Musculoskeletal:  No deformity or edema of the upper or lower extremities.    Neurologic:  Oriented x3.  Cranial nerves 2-12 are grossly intact.  No focal deficits.  Psychiatric:  Cooperative.  Appropriate mood and affect.          Assessment/PLAN:    1. Type 2 diabetes mellitus without complication, without long-term current use of insulin (CMS/[facility])  Continue current medications and will work to improve his diet and exercise plan (admits to eating chocolate regularly as well as pastas and dumplings).  Has not tried Cialis as his girlfriend is in the [facility], he is visiting her next month and is unsure if he has any improvement with his ED that has been attributed to his glucose levels.    - Alogliptin Benzoate (NESINA) 12.5 MG Tab; Take 1 tablet by mouth daily.  Dispense: 30 tablet; Refill: 2    2. Mixed hyperlipidemia  Continue atorvastatin therapy.    3. Essential hypertension  Doubling dose of Lisinopril.  He checks regularly at home and we will work to correct to appropriate levels.  - lisinopril (ZESTRIL) 20 MG tablet; Take 1 tablet by mouth daily.  Dispense: 90 tablet; Refill: 0        Return in about 3 months (around 8/25/2018) for DM/HTN follow up.           Instructions provided as documented in the AVS (after visit summary).    The patient indicated understanding of the diagnosis and agreed with the plan of care.   Treatment options discussed with  patient and explained in detail. The risks, benefits and potential side effects of possible medications were reviewed. Alternatives were discussed. Medication instructions and consequences of not taking the medications were discussed. Patient's understanding was assessed and patient agreed with the plan. Monitoring parameters and expected course outlined. Patient to call or come in if symptoms fail to respond as outlined or worsen in any way.    Bruce Jackson MD     82

## 2024-12-13 VITALS
TEMPERATURE: 98 F | SYSTOLIC BLOOD PRESSURE: 127 MMHG | DIASTOLIC BLOOD PRESSURE: 70 MMHG | RESPIRATION RATE: 16 BRPM | HEART RATE: 104 BPM | OXYGEN SATURATION: 100 %

## 2024-12-13 NOTE — ED ADULT NURSE NOTE - NSFALLUNIVINTERV_ED_ALL_ED
Bed/Stretcher in lowest position, wheels locked, appropriate side rails in place/Call bell, personal items and telephone in reach/Instruct patient to call for assistance before getting out of bed/chair/stretcher/Non-slip footwear applied when patient is off stretcher/[place] to call system/Physically safe environment - no spills, clutter or unnecessary equipment/Purposeful proactive rounding/Room/bathroom lighting operational, light cord in reach

## 2024-12-14 ENCOUNTER — EMERGENCY (EMERGENCY)
Facility: HOSPITAL | Age: 39
LOS: 1 days | Discharge: HOME | End: 2024-12-14
Attending: EMERGENCY MEDICINE
Payer: MEDICARE

## 2024-12-14 ENCOUNTER — NON-APPOINTMENT (OUTPATIENT)
Age: 39
End: 2024-12-14

## 2024-12-14 VITALS
WEIGHT: 293 LBS | OXYGEN SATURATION: 98 % | DIASTOLIC BLOOD PRESSURE: 80 MMHG | HEIGHT: 71 IN | RESPIRATION RATE: 19 BRPM | SYSTOLIC BLOOD PRESSURE: 116 MMHG | TEMPERATURE: 98 F | HEART RATE: 123 BPM

## 2024-12-14 VITALS
TEMPERATURE: 98 F | RESPIRATION RATE: 18 BRPM | DIASTOLIC BLOOD PRESSURE: 80 MMHG | SYSTOLIC BLOOD PRESSURE: 118 MMHG | OXYGEN SATURATION: 98 % | HEART RATE: 96 BPM

## 2024-12-14 DIAGNOSIS — Z90.49 ACQUIRED ABSENCE OF OTHER SPECIFIED PARTS OF DIGESTIVE TRACT: Chronic | ICD-10-CM

## 2024-12-14 LAB
ALBUMIN SERPL ELPH-MCNC: 3.5 G/DL — SIGNIFICANT CHANGE UP (ref 3.5–5)
ALP SERPL-CCNC: 68 U/L — SIGNIFICANT CHANGE UP (ref 40–120)
ALT FLD-CCNC: 53 U/L DA — SIGNIFICANT CHANGE UP (ref 10–60)
ANION GAP SERPL CALC-SCNC: 4 MMOL/L — LOW (ref 5–17)
APTT BLD: 22.8 SEC — LOW (ref 24.5–35.6)
AST SERPL-CCNC: 16 U/L — SIGNIFICANT CHANGE UP (ref 10–40)
BASOPHILS # BLD AUTO: 0.04 K/UL — SIGNIFICANT CHANGE UP (ref 0–0.2)
BASOPHILS NFR BLD AUTO: 0.4 % — SIGNIFICANT CHANGE UP (ref 0–2)
BILIRUB SERPL-MCNC: 0.2 MG/DL — SIGNIFICANT CHANGE UP (ref 0.2–1.2)
BUN SERPL-MCNC: 11 MG/DL — SIGNIFICANT CHANGE UP (ref 7–18)
CALCIUM SERPL-MCNC: 9.1 MG/DL — SIGNIFICANT CHANGE UP (ref 8.4–10.5)
CHLORIDE SERPL-SCNC: 109 MMOL/L — HIGH (ref 96–108)
CO2 SERPL-SCNC: 28 MMOL/L — SIGNIFICANT CHANGE UP (ref 22–31)
CREAT SERPL-MCNC: 0.89 MG/DL — SIGNIFICANT CHANGE UP (ref 0.5–1.3)
EGFR: 86 ML/MIN/1.73M2 — SIGNIFICANT CHANGE UP
EOSINOPHIL # BLD AUTO: 0.04 K/UL — SIGNIFICANT CHANGE UP (ref 0–0.5)
EOSINOPHIL NFR BLD AUTO: 0.4 % — SIGNIFICANT CHANGE UP (ref 0–6)
GLUCOSE SERPL-MCNC: 121 MG/DL — HIGH (ref 70–99)
HCG SERPL-ACNC: <1 MIU/ML — SIGNIFICANT CHANGE UP
HCT VFR BLD CALC: 31.2 % — LOW (ref 34.5–45)
HGB BLD-MCNC: 10.3 G/DL — LOW (ref 11.5–15.5)
IMM GRANULOCYTES NFR BLD AUTO: 1.8 % — HIGH (ref 0–0.9)
INR BLD: 1.05 RATIO — SIGNIFICANT CHANGE UP (ref 0.85–1.16)
LYMPHOCYTES # BLD AUTO: 2.71 K/UL — SIGNIFICANT CHANGE UP (ref 1–3.3)
LYMPHOCYTES # BLD AUTO: 28.6 % — SIGNIFICANT CHANGE UP (ref 13–44)
MAGNESIUM SERPL-MCNC: 2.1 MG/DL — SIGNIFICANT CHANGE UP (ref 1.6–2.6)
MCHC RBC-ENTMCNC: 31.8 PG — SIGNIFICANT CHANGE UP (ref 27–34)
MCHC RBC-ENTMCNC: 33 G/DL — SIGNIFICANT CHANGE UP (ref 32–36)
MCV RBC AUTO: 96.3 FL — SIGNIFICANT CHANGE UP (ref 80–100)
MONOCYTES # BLD AUTO: 0.74 K/UL — SIGNIFICANT CHANGE UP (ref 0–0.9)
MONOCYTES NFR BLD AUTO: 7.8 % — SIGNIFICANT CHANGE UP (ref 2–14)
NEUTROPHILS # BLD AUTO: 5.77 K/UL — SIGNIFICANT CHANGE UP (ref 1.8–7.4)
NEUTROPHILS NFR BLD AUTO: 61 % — SIGNIFICANT CHANGE UP (ref 43–77)
NRBC # BLD: 0 /100 WBCS — SIGNIFICANT CHANGE UP (ref 0–0)
PLATELET # BLD AUTO: 191 K/UL — SIGNIFICANT CHANGE UP (ref 150–400)
POTASSIUM SERPL-MCNC: 4.3 MMOL/L — SIGNIFICANT CHANGE UP (ref 3.5–5.3)
POTASSIUM SERPL-SCNC: 4.3 MMOL/L — SIGNIFICANT CHANGE UP (ref 3.5–5.3)
PROT SERPL-MCNC: 6.6 G/DL — SIGNIFICANT CHANGE UP (ref 6–8.3)
PROTHROM AB SERPL-ACNC: 12.3 SEC — SIGNIFICANT CHANGE UP (ref 9.9–13.4)
RBC # BLD: 3.24 M/UL — LOW (ref 3.8–5.2)
RBC # FLD: 15.3 % — HIGH (ref 10.3–14.5)
SODIUM SERPL-SCNC: 141 MMOL/L — SIGNIFICANT CHANGE UP (ref 135–145)
TROPONIN I, HIGH SENSITIVITY RESULT: 3 NG/L — SIGNIFICANT CHANGE UP
WBC # BLD: 9.47 K/UL — SIGNIFICANT CHANGE UP (ref 3.8–10.5)
WBC # FLD AUTO: 9.47 K/UL — SIGNIFICANT CHANGE UP (ref 3.8–10.5)

## 2024-12-14 PROCEDURE — 36415 COLL VENOUS BLD VENIPUNCTURE: CPT

## 2024-12-14 PROCEDURE — 84484 ASSAY OF TROPONIN QUANT: CPT

## 2024-12-14 PROCEDURE — 99285 EMERGENCY DEPT VISIT HI MDM: CPT

## 2024-12-14 PROCEDURE — 80053 COMPREHEN METABOLIC PANEL: CPT

## 2024-12-14 PROCEDURE — 93005 ELECTROCARDIOGRAM TRACING: CPT

## 2024-12-14 PROCEDURE — 84702 CHORIONIC GONADOTROPIN TEST: CPT

## 2024-12-14 PROCEDURE — 99283 EMERGENCY DEPT VISIT LOW MDM: CPT

## 2024-12-14 PROCEDURE — 93010 ELECTROCARDIOGRAM REPORT: CPT

## 2024-12-14 PROCEDURE — 86901 BLOOD TYPING SEROLOGIC RH(D): CPT

## 2024-12-14 PROCEDURE — 85730 THROMBOPLASTIN TIME PARTIAL: CPT

## 2024-12-14 PROCEDURE — 86850 RBC ANTIBODY SCREEN: CPT

## 2024-12-14 PROCEDURE — 85610 PROTHROMBIN TIME: CPT

## 2024-12-14 PROCEDURE — 86900 BLOOD TYPING SEROLOGIC ABO: CPT

## 2024-12-14 PROCEDURE — 83735 ASSAY OF MAGNESIUM: CPT

## 2024-12-14 PROCEDURE — 85025 COMPLETE CBC W/AUTO DIFF WBC: CPT

## 2024-12-14 RX ORDER — OXYCODONE AND ACETAMINOPHEN 5; 325 MG/1; MG/1
1 TABLET ORAL ONCE
Refills: 0 | Status: DISCONTINUED | OUTPATIENT
Start: 2024-12-14 | End: 2024-12-14

## 2024-12-14 RX ORDER — DIPHENHYDRAMINE HCL 25 MG
25 CAPSULE ORAL ONCE
Refills: 0 | Status: COMPLETED | OUTPATIENT
Start: 2024-12-14 | End: 2024-12-14

## 2024-12-14 RX ORDER — SODIUM CHLORIDE 9 MG/ML
1000 INJECTION, SOLUTION INTRAMUSCULAR; INTRAVENOUS; SUBCUTANEOUS ONCE
Refills: 0 | Status: COMPLETED | OUTPATIENT
Start: 2024-12-14 | End: 2024-12-14

## 2024-12-14 RX ORDER — DIPHENHYDRAMINE HCL 25 MG
50 CAPSULE ORAL ONCE
Refills: 0 | Status: COMPLETED | OUTPATIENT
Start: 2024-12-14 | End: 2024-12-14

## 2024-12-14 RX ORDER — DIPHENHYDRAMINE HCL 25 MG
50 CAPSULE ORAL ONCE
Refills: 0 | Status: DISCONTINUED | OUTPATIENT
Start: 2024-12-14 | End: 2024-12-14

## 2024-12-14 RX ADMIN — OXYCODONE AND ACETAMINOPHEN 1 TABLET(S): 5; 325 TABLET ORAL at 12:33

## 2024-12-14 RX ADMIN — OXYCODONE AND ACETAMINOPHEN 1 TABLET(S): 5; 325 TABLET ORAL at 13:57

## 2024-12-14 RX ADMIN — Medication 25 MILLIGRAM(S): at 14:48

## 2024-12-14 RX ADMIN — Medication 50 MILLIGRAM(S): at 12:33

## 2024-12-14 NOTE — ED PROVIDER NOTE - CLINICAL SUMMARY MEDICAL DECISION MAKING FREE TEXT BOX
39-year-old female presents with lightheaded sensation.  PE as above.    Labs, EKG, fluid bolus, reassess.

## 2024-12-14 NOTE — ED PROVIDER NOTE - PROGRESS NOTE DETAILS
EKG sinus tachycardia otherwise unremarkable.  Labs are unremarkable.  Patient feels improved in the ED which is oral hydration.  Will discharge.  Advised to maintain hydration.  Follow-up with primary care doctor.  Return precautions discussed.

## 2024-12-14 NOTE — ED PROVIDER NOTE - OBJECTIVE STATEMENT
39-year-old female with a past medical history of asthma, diabetes, GERD, bipolar, DVT, hypertension, hypothyroid, seizure history presents for feeling lightheaded.  Patient states that she woke up around this time when it was for lunch at her group home and when she went to the kitchen to get something to eat she felt dizzy.  Patient states she did not pass out but felt kind of like she was going to.  Patient states she frequently gets symptoms like this.  Patient states also felt like she had a mild allergy to something she had eaten during the symptoms.  Denies chest pain, shortness of breath, abdominal pain, fever, nausea, vomiting, diarrhea, back pains, headache.

## 2024-12-14 NOTE — ED ADULT NURSE NOTE - NSFALLRISKINTERV_ED_ALL_ED

## 2024-12-14 NOTE — ED PROVIDER NOTE - PATIENT PORTAL LINK FT
show
You can access the FollowMyHealth Patient Portal offered by [facility] by registering at the following website: [email or web address]. By joining [employer]’s FollowMyHealth portal, you will also be able to view your health information using other applications (apps) compatible with our system.

## 2024-12-14 NOTE — ED PROVIDER NOTE - DISPOSITION TYPE
ICU team given plan of care update. Gil and arterial line discontinued. Patient refusing to work with OT, rehab team planning to come back this afternoon. Awaiting neurosurgery to round on patient, will hopefully downgrade today.     1300: Neurosurgery at bedside. Okay to transfer if patient's headache, nausea/ vomiting improves by 1600. Patient refusing to have family updated, she states she will update brother via text message.                DISCHARGE

## 2024-12-15 ENCOUNTER — EMERGENCY (EMERGENCY)
Facility: HOSPITAL | Age: 39
LOS: 1 days | Discharge: HOME | End: 2024-12-15
Attending: EMERGENCY MEDICINE
Payer: MEDICARE

## 2024-12-15 VITALS
OXYGEN SATURATION: 95 % | SYSTOLIC BLOOD PRESSURE: 114 MMHG | HEIGHT: 71 IN | TEMPERATURE: 98 F | HEART RATE: 103 BPM | DIASTOLIC BLOOD PRESSURE: 70 MMHG | RESPIRATION RATE: 16 BRPM

## 2024-12-15 VITALS
TEMPERATURE: 98 F | RESPIRATION RATE: 17 BRPM | SYSTOLIC BLOOD PRESSURE: 106 MMHG | HEIGHT: 71 IN | WEIGHT: 293 LBS | HEART RATE: 97 BPM | DIASTOLIC BLOOD PRESSURE: 76 MMHG | OXYGEN SATURATION: 94 %

## 2024-12-15 DIAGNOSIS — Z90.49 ACQUIRED ABSENCE OF OTHER SPECIFIED PARTS OF DIGESTIVE TRACT: Chronic | ICD-10-CM

## 2024-12-15 PROCEDURE — 99282 EMERGENCY DEPT VISIT SF MDM: CPT

## 2024-12-15 PROCEDURE — 99283 EMERGENCY DEPT VISIT LOW MDM: CPT

## 2024-12-15 PROCEDURE — 99284 EMERGENCY DEPT VISIT MOD MDM: CPT

## 2024-12-15 RX ORDER — MAGNESIUM, ALUMINUM HYDROXIDE 200-225/5
30 SUSPENSION, ORAL (FINAL DOSE FORM) ORAL ONCE
Refills: 0 | Status: COMPLETED | OUTPATIENT
Start: 2024-12-15 | End: 2024-12-15

## 2024-12-15 RX ORDER — SODIUM CHLORIDE 9 MG/ML
2000 INJECTION, SOLUTION INTRAMUSCULAR; INTRAVENOUS; SUBCUTANEOUS ONCE
Refills: 0 | Status: COMPLETED | OUTPATIENT
Start: 2024-12-15 | End: 2024-12-15

## 2024-12-15 RX ORDER — METOCLOPRAMIDE HYDROCHLORIDE 10 MG/1
10 TABLET ORAL ONCE
Refills: 0 | Status: COMPLETED | OUTPATIENT
Start: 2024-12-15 | End: 2024-12-15

## 2024-12-15 RX ORDER — OXYCODONE AND ACETAMINOPHEN 5; 325 MG/1; MG/1
1 TABLET ORAL ONCE
Refills: 0 | Status: DISCONTINUED | OUTPATIENT
Start: 2024-12-15 | End: 2024-12-15

## 2024-12-15 RX ORDER — DIPHENHYDRAMINE HCL 25 MG
25 CAPSULE ORAL ONCE
Refills: 0 | Status: COMPLETED | OUTPATIENT
Start: 2024-12-15 | End: 2024-12-15

## 2024-12-15 RX ORDER — DIPHENHYDRAMINE HCL 25 MG
50 CAPSULE ORAL ONCE
Refills: 0 | Status: COMPLETED | OUTPATIENT
Start: 2024-12-15 | End: 2024-12-15

## 2024-12-15 RX ORDER — SUCRALFATE 1 G/1
1 TABLET ORAL ONCE
Refills: 0 | Status: COMPLETED | OUTPATIENT
Start: 2024-12-15 | End: 2024-12-15

## 2024-12-15 RX ADMIN — Medication 4 MILLIGRAM(S): at 10:00

## 2024-12-15 RX ADMIN — Medication 25 MILLIGRAM(S): at 11:08

## 2024-12-15 RX ADMIN — Medication 30 MILLILITER(S): at 10:02

## 2024-12-15 RX ADMIN — OXYCODONE AND ACETAMINOPHEN 1 TABLET(S): 5; 325 TABLET ORAL at 11:08

## 2024-12-15 RX ADMIN — METOCLOPRAMIDE HYDROCHLORIDE 10 MILLIGRAM(S): 10 TABLET ORAL at 10:01

## 2024-12-15 RX ADMIN — SUCRALFATE 1 GRAM(S): 1 TABLET ORAL at 10:01

## 2024-12-15 RX ADMIN — Medication 50 MILLIGRAM(S): at 10:02

## 2024-12-15 NOTE — ED ADULT NURSE NOTE - NSFALLHARMRISKINTERV_ED_ALL_ED

## 2024-12-15 NOTE — ED PROVIDER NOTE - PATIENT PORTAL LINK FT
You can access the FollowMyHealth Patient Portal offered by [facility] by registering at the following website: [email or web address]. By joining [facility]’s FollowMyHealth portal, you will also be able to view your health information using other applications (apps) compatible with our system.

## 2024-12-15 NOTE — ED ADULT NURSE NOTE - OBJECTIVE STATEMENT
pt bib ambulance from home. pt c/o nausea, diarrhea. pt states "I have black stools mixed with brown". pt pmh: dm, hypertension, gerd.

## 2024-12-15 NOTE — ED PROVIDER NOTE - CLINICAL SUMMARY MEDICAL DECISION MAKING FREE TEXT BOX
39-year-old female with complaining of lower abdominal pain, loose stool, feeling nauseous.  She was evaluated in ED earlier and discharged home.  Now returns with similar complaints. patient does not have colitis.  Patient requesting for Percocet and Benadryl IV for her abdominal pain, and also requesting for food because she is feeling hungry. explained to patient due to her diarrhea, will not feed her except to give her water and crackers, patient refused.  Patient now claims she wants to go home.  Patient in no distress, ambulating with no difficulties, she does not appears to be dehydrated, will discharge home

## 2024-12-15 NOTE — ED PROVIDER NOTE - NSFOLLOWUPINSTRUCTIONS_ED_ALL_ED_FT
Food Choices to Help Relieve Diarrhea, Adult  Diarrhea can make you feel weak and cause you to become dehydrated. Dehydration is a condition in which there is not enough water or other fluids in the body. It is important to choose the right foods and drinks to:  Relieve diarrhea.  Replace lost fluids and nutrients.  Prevent dehydration.  What are tips for following this plan?  Relieving diarrhea    Avoid foods that make your diarrhea worse. These may include:  Foods and drinks that are sweetened with high-fructose corn syrup, honey, or sweeteners such as xylitol, sorbitol, and mannitol. Check food labels for these ingredients.  Fried, greasy, or spicy foods.  Raw fruits and vegetables.  Eat foods that are rich in probiotics. These include foods such as yogurt and fermented milk products. Probiotics can help increase healthy bacteria in your stomach and intestines (gastrointestinal or GI tract). This may help digestion and stop diarrhea.  If you have lactose intolerance, avoid dairy products. These may make your diarrhea worse.  Take medicine to help stop diarrhea only as told by your health care provider.  Replacing nutrients    Bananas next to a bowl of applesauce.  Eat bland, easy-to-digest foods in small amounts as you are able, until your diarrhea starts to get better. These foods include bananas, applesauce, rice, toast, and crackers.  Over time, add nutrient-rich foods as your body tolerates them or as told by your health care provider. These include:  Well-cooked protein foods, such as eggs, lean meats like fish or chicken without skin, and tofu.  Peeled, seeded, and soft-cooked fruits and vegetables.  Low-fat dairy products.  Whole grains.  Take vitamin and mineral supplements as told by your health care provider.  Preventing dehydration    A bottle of clear fruit juice and glass of water.  Start by sipping water or a solution to prevent dehydration (oral rehydration solution, or ORS). This is a drink that helps replace fluids and minerals your body has lost. You can buy an ORS at pharmacies and retail stores.  Try to drink at least 8–10 cups (2,000–2,500 mL) of fluid each day to help replace lost fluids. If your urine is pale yellow, you are getting enough fluids.  You may drink other liquids in addition to water, such as fruit juice that you have added water to (diluted fruit juice) or low-calorie sports drinks, as tolerated or as told by your health care provider.  Avoid drinks with caffeine, such as coffee, tea, or soft drinks.  Avoid alcohol.  This information is not intended to replace advice given to you by your health care provider. Make sure you discuss any questions you have with your health care provider.      Drink plenty of fluids   avoid dairy products, greasy, fatty, fried food for at least 1 to 2 days   follow-up with your medical doctor

## 2024-12-15 NOTE — ED PROVIDER NOTE - OBJECTIVE STATEMENT
39-year-old female with history of colitis, asthma, bipolar, factor V Leiden on Eliquis, seizure disorder, hypothyroidism, GERD, DM, LMP last month.  Patient was seen in ED earlier today for abdominal pain and diarrhea discharge home.  She returns complaining of constant lower abdominal pain, feeling weak, with 1 loose BM, no BRBPR, feeling nauseous, no vomiting, no fever, no chills, no dysuria.  Patient is asking for Percocet and IV Benadryl.  She again asked for food because she is feeling hungry

## 2024-12-15 NOTE — ED PROVIDER NOTE - PROGRESS NOTE DETAILS
Feels improved.  Advised to avoid spicy food and greasy food.  Follow-up with primary care doctor.  Return precautions discussed.

## 2024-12-15 NOTE — ED PROVIDER NOTE - CLINICAL SUMMARY MEDICAL DECISION MAKING FREE TEXT BOX
40-year-old female presents with diarrhea.  PE as above.    Unremarkable physical exam.  Will treat with GI cocktail medications and pain medications.  Will reassess.

## 2024-12-15 NOTE — ED PROVIDER NOTE - PATIENT PORTAL LINK FT
You can access the FollowMyHealth Patient Portal offered by [facility] by registering at the following website: [email or web address]. By joining "[employer]’s FollowMyHealth portal, you will also be able to view your health information using other applications (apps) compatible with our system.

## 2024-12-15 NOTE — ED PROVIDER NOTE - OBJECTIVE STATEMENT
39-year-old female with a past medical history of asthma, diabetes, GERD, bipolar, DVT, hypertension, hypothyroid, seizure Presents with diarrhea and mild abdominal pain.  Patient states she has a history of colitis and yesterday she ate some spicy chicken and thinks that is causing her colitis to flareup.  States also feels mild nausea.

## 2024-12-16 ENCOUNTER — APPOINTMENT (OUTPATIENT)
Dept: NEUROLOGY | Facility: CLINIC | Age: 39
End: 2024-12-16

## 2024-12-21 ENCOUNTER — NON-APPOINTMENT (OUTPATIENT)
Age: 39
End: 2024-12-21

## 2024-12-24 ENCOUNTER — NON-APPOINTMENT (OUTPATIENT)
Age: 39
End: 2024-12-24

## 2024-12-24 ENCOUNTER — EMERGENCY (EMERGENCY)
Facility: HOSPITAL | Age: 39
LOS: 1 days | Discharge: HOME | End: 2024-12-24
Attending: EMERGENCY MEDICINE
Payer: MEDICARE

## 2024-12-24 VITALS
SYSTOLIC BLOOD PRESSURE: 145 MMHG | OXYGEN SATURATION: 99 % | RESPIRATION RATE: 16 BRPM | WEIGHT: 293 LBS | HEIGHT: 71 IN | TEMPERATURE: 97 F | HEART RATE: 68 BPM | DIASTOLIC BLOOD PRESSURE: 80 MMHG

## 2024-12-24 VITALS — OXYGEN SATURATION: 100 % | TEMPERATURE: 98 F | HEART RATE: 98 BPM | RESPIRATION RATE: 18 BRPM

## 2024-12-24 DIAGNOSIS — Z90.49 ACQUIRED ABSENCE OF OTHER SPECIFIED PARTS OF DIGESTIVE TRACT: Chronic | ICD-10-CM

## 2024-12-24 LAB
ALBUMIN SERPL ELPH-MCNC: 3.9 G/DL — SIGNIFICANT CHANGE UP (ref 3.5–5)
ALP SERPL-CCNC: 81 U/L — SIGNIFICANT CHANGE UP (ref 40–120)
ALT FLD-CCNC: 48 U/L DA — SIGNIFICANT CHANGE UP (ref 10–60)
ANION GAP SERPL CALC-SCNC: 5 MMOL/L — SIGNIFICANT CHANGE UP (ref 5–17)
APPEARANCE UR: ABNORMAL
APTT BLD: 20.4 SEC — LOW (ref 24.5–35.6)
AST SERPL-CCNC: 24 U/L — SIGNIFICANT CHANGE UP (ref 10–40)
BASOPHILS # BLD AUTO: 0.01 K/UL — SIGNIFICANT CHANGE UP (ref 0–0.2)
BASOPHILS NFR BLD AUTO: 0.2 % — SIGNIFICANT CHANGE UP (ref 0–2)
BILIRUB SERPL-MCNC: 0.2 MG/DL — SIGNIFICANT CHANGE UP (ref 0.2–1.2)
BILIRUB UR-MCNC: ABNORMAL
BUN SERPL-MCNC: 11 MG/DL — SIGNIFICANT CHANGE UP (ref 7–18)
CALCIUM SERPL-MCNC: 9 MG/DL — SIGNIFICANT CHANGE UP (ref 8.4–10.5)
CHLORIDE SERPL-SCNC: 109 MMOL/L — HIGH (ref 96–108)
CO2 SERPL-SCNC: 28 MMOL/L — SIGNIFICANT CHANGE UP (ref 22–31)
COLOR SPEC: SIGNIFICANT CHANGE UP
CREAT SERPL-MCNC: 0.72 MG/DL — SIGNIFICANT CHANGE UP (ref 0.5–1.3)
DIFF PNL FLD: NEGATIVE — SIGNIFICANT CHANGE UP
EGFR: 110 ML/MIN/1.73M2 — SIGNIFICANT CHANGE UP
EOSINOPHIL # BLD AUTO: 0.06 K/UL — SIGNIFICANT CHANGE UP (ref 0–0.5)
EOSINOPHIL NFR BLD AUTO: 1.1 % — SIGNIFICANT CHANGE UP (ref 0–6)
GLUCOSE SERPL-MCNC: 93 MG/DL — SIGNIFICANT CHANGE UP (ref 70–99)
GLUCOSE UR QL: NEGATIVE MG/DL — SIGNIFICANT CHANGE UP
HCG SERPL-ACNC: <1 MIU/ML — SIGNIFICANT CHANGE UP
HCT VFR BLD CALC: 32.3 % — LOW (ref 34.5–45)
HGB BLD-MCNC: 10.5 G/DL — LOW (ref 11.5–15.5)
IMM GRANULOCYTES NFR BLD AUTO: 0.9 % — SIGNIFICANT CHANGE UP (ref 0–0.9)
INR BLD: 1.03 RATIO — SIGNIFICANT CHANGE UP (ref 0.85–1.16)
KETONES UR-MCNC: 15 MG/DL
LACTATE SERPL-SCNC: 1.5 MMOL/L — SIGNIFICANT CHANGE UP (ref 0.7–2)
LEUKOCYTE ESTERASE UR-ACNC: NEGATIVE — SIGNIFICANT CHANGE UP
LYMPHOCYTES # BLD AUTO: 1.76 K/UL — SIGNIFICANT CHANGE UP (ref 1–3.3)
LYMPHOCYTES # BLD AUTO: 31.4 % — SIGNIFICANT CHANGE UP (ref 13–44)
MCHC RBC-ENTMCNC: 30.9 PG — SIGNIFICANT CHANGE UP (ref 27–34)
MCHC RBC-ENTMCNC: 32.5 G/DL — SIGNIFICANT CHANGE UP (ref 32–36)
MCV RBC AUTO: 95 FL — SIGNIFICANT CHANGE UP (ref 80–100)
MONOCYTES # BLD AUTO: 0.49 K/UL — SIGNIFICANT CHANGE UP (ref 0–0.9)
MONOCYTES NFR BLD AUTO: 8.8 % — SIGNIFICANT CHANGE UP (ref 2–14)
NEUTROPHILS # BLD AUTO: 3.23 K/UL — SIGNIFICANT CHANGE UP (ref 1.8–7.4)
NEUTROPHILS NFR BLD AUTO: 57.6 % — SIGNIFICANT CHANGE UP (ref 43–77)
NITRITE UR-MCNC: NEGATIVE — SIGNIFICANT CHANGE UP
NRBC # BLD: 0 /100 WBCS — SIGNIFICANT CHANGE UP (ref 0–0)
PH UR: 6 — SIGNIFICANT CHANGE UP (ref 5–8)
PLATELET # BLD AUTO: 113 K/UL — LOW (ref 150–400)
POTASSIUM SERPL-MCNC: 4.7 MMOL/L — SIGNIFICANT CHANGE UP (ref 3.5–5.3)
POTASSIUM SERPL-SCNC: 4.7 MMOL/L — SIGNIFICANT CHANGE UP (ref 3.5–5.3)
PROT SERPL-MCNC: 7.1 G/DL — SIGNIFICANT CHANGE UP (ref 6–8.3)
PROT UR-MCNC: ABNORMAL MG/DL
PROTHROM AB SERPL-ACNC: 12 SEC — SIGNIFICANT CHANGE UP (ref 9.9–13.4)
RBC # BLD: 3.4 M/UL — LOW (ref 3.8–5.2)
RBC # FLD: 15.7 % — HIGH (ref 10.3–14.5)
SODIUM SERPL-SCNC: 142 MMOL/L — SIGNIFICANT CHANGE UP (ref 135–145)
SP GR SPEC: 1.04 — HIGH (ref 1–1.03)
UROBILINOGEN FLD QL: 1 MG/DL — SIGNIFICANT CHANGE UP (ref 0.2–1)
WBC # BLD: 5.6 K/UL — SIGNIFICANT CHANGE UP (ref 3.8–10.5)
WBC # FLD AUTO: 5.6 K/UL — SIGNIFICANT CHANGE UP (ref 3.8–10.5)

## 2024-12-24 PROCEDURE — 99284 EMERGENCY DEPT VISIT MOD MDM: CPT

## 2024-12-24 PROCEDURE — 80053 COMPREHEN METABOLIC PANEL: CPT

## 2024-12-24 PROCEDURE — 85025 COMPLETE CBC W/AUTO DIFF WBC: CPT

## 2024-12-24 PROCEDURE — 84702 CHORIONIC GONADOTROPIN TEST: CPT

## 2024-12-24 PROCEDURE — 81001 URINALYSIS AUTO W/SCOPE: CPT

## 2024-12-24 PROCEDURE — 85730 THROMBOPLASTIN TIME PARTIAL: CPT

## 2024-12-24 PROCEDURE — 85610 PROTHROMBIN TIME: CPT

## 2024-12-24 PROCEDURE — 83605 ASSAY OF LACTIC ACID: CPT

## 2024-12-24 PROCEDURE — 36415 COLL VENOUS BLD VENIPUNCTURE: CPT

## 2024-12-24 RX ORDER — ACETAMINOPHEN 500MG 500 MG/1
975 TABLET, COATED ORAL ONCE
Refills: 0 | Status: COMPLETED | OUTPATIENT
Start: 2024-12-24 | End: 2024-12-24

## 2024-12-24 RX ADMIN — ACETAMINOPHEN 500MG 975 MILLIGRAM(S): 500 TABLET, COATED ORAL at 16:28

## 2024-12-24 NOTE — ED PROVIDER NOTE - OBJECTIVE STATEMENT
39-year-old with history of factor V Leiden, DVTs, PE, on Eliquis, states is compliant with this, history of ischemic cardiopathy, complex care patient with extensive notes in system with history of drug use, please refer to this note, presents with report of rectal bleeding. She states she had only on the toilet paper yesterday, and then today had the toilet paper and also a streak of blood in the toilet. Denies all other symptoms including vomiting, fever, urinary symptoms. Requests Percocet and Benadryl.

## 2024-12-24 NOTE — ED PROVIDER NOTE - PATIENT PORTAL LINK FT
You can access the FollowMyHealth Patient Portal offered by [facility] by registering at the following website: [email or web address]. By joining [email or web address]’s FollowMyHealth portal, you will also be able to view your health information using other applications (apps) compatible with our system.

## 2024-12-24 NOTE — ED PROVIDER NOTE - CLINICAL SUMMARY MEDICAL DECISION MAKING FREE TEXT BOX
No pain or tenderness, with suspicion for acute intra-abdominal process including colitis to warrant imaging or antibiotics or surgical intervention. No active bleeding and hemodynamically stable and hemoglobin is her baseline, and furthermore she has a long history of bleeding and states had a past school back in March that was unremarkable, and does not require admission or transfusion at this time.Noted extensive complex care note. Patient is well appearing, NAD, afebrile, hemodynamically stable. Any available tests and studies were discussed with patient. Discharged with instructions in further symptomatic care, return precautions, and need for GI f/u.
Statement Selected

## 2024-12-24 NOTE — ED PROVIDER NOTE - NSFOLLOWUPINSTRUCTIONS_ED_ALL_ED_FT
Please follow up with your GI doctor in 1-2 days.  Please return to the emergency department if you have worsening bleeding, pain, vomiting, fever, or any other symptoms.

## 2024-12-24 NOTE — ED PROVIDER NOTE - PHYSICAL EXAMINATION
Afebrile, hemodynamically stable, saturating well on room air  NAD, well appearing, sitting comfortably in bed, no WOB/tachypnea, speaking full sentences  Head NCAT  EOMI grossly, anicteric  MMM  RRR  Breathing comfortably on room air  Abd soft, NT, ND, no rebound or guarding  Rectal (PCA Saadia as chaperone, verbal consent obtained from patient): Normal brown stool, no bleeding noted, no hemorrhoids  AAO, CN's 3-12 grossly intact  RHODES spontaneously, no leg cyanosis or edema  Skin warm, well perfused, no rashes or hives

## 2024-12-31 ENCOUNTER — NON-APPOINTMENT (OUTPATIENT)
Age: 39
End: 2024-12-31

## 2024-12-31 ENCOUNTER — EMERGENCY (EMERGENCY)
Facility: HOSPITAL | Age: 39
LOS: 1 days | Discharge: HOME | End: 2024-12-31
Attending: EMERGENCY MEDICINE
Payer: MEDICARE

## 2024-12-31 VITALS
HEART RATE: 107 BPM | TEMPERATURE: 98 F | SYSTOLIC BLOOD PRESSURE: 124 MMHG | HEIGHT: 71 IN | RESPIRATION RATE: 16 BRPM | DIASTOLIC BLOOD PRESSURE: 88 MMHG | OXYGEN SATURATION: 96 % | WEIGHT: 293 LBS

## 2024-12-31 VITALS — HEART RATE: 103 BPM

## 2024-12-31 DIAGNOSIS — Z90.49 ACQUIRED ABSENCE OF OTHER SPECIFIED PARTS OF DIGESTIVE TRACT: Chronic | ICD-10-CM

## 2024-12-31 LAB
ALBUMIN SERPL ELPH-MCNC: 3.1 G/DL — LOW (ref 3.5–5)
ALP SERPL-CCNC: 83 U/L — SIGNIFICANT CHANGE UP (ref 40–120)
ALT FLD-CCNC: 61 U/L DA — HIGH (ref 10–60)
ANION GAP SERPL CALC-SCNC: 4 MMOL/L — LOW (ref 5–17)
APPEARANCE UR: ABNORMAL
AST SERPL-CCNC: 46 U/L — HIGH (ref 10–40)
BACTERIA # UR AUTO: ABNORMAL /HPF
BASOPHILS # BLD AUTO: 0.01 K/UL — SIGNIFICANT CHANGE UP (ref 0–0.2)
BASOPHILS NFR BLD AUTO: 0.2 % — SIGNIFICANT CHANGE UP (ref 0–2)
BILIRUB SERPL-MCNC: 0.2 MG/DL — SIGNIFICANT CHANGE UP (ref 0.2–1.2)
BILIRUB UR-MCNC: NEGATIVE — SIGNIFICANT CHANGE UP
BUN SERPL-MCNC: 12 MG/DL — SIGNIFICANT CHANGE UP (ref 7–18)
CALCIUM SERPL-MCNC: 8.8 MG/DL — SIGNIFICANT CHANGE UP (ref 8.4–10.5)
CHLORIDE SERPL-SCNC: 109 MMOL/L — HIGH (ref 96–108)
CO2 SERPL-SCNC: 25 MMOL/L — SIGNIFICANT CHANGE UP (ref 22–31)
COLOR SPEC: YELLOW — SIGNIFICANT CHANGE UP
CREAT SERPL-MCNC: 0.73 MG/DL — SIGNIFICANT CHANGE UP (ref 0.5–1.3)
DIFF PNL FLD: NEGATIVE — SIGNIFICANT CHANGE UP
EGFR: 109 ML/MIN/1.73M2 — SIGNIFICANT CHANGE UP
EOSINOPHIL # BLD AUTO: 0.05 K/UL — SIGNIFICANT CHANGE UP (ref 0–0.5)
EOSINOPHIL NFR BLD AUTO: 1 % — SIGNIFICANT CHANGE UP (ref 0–6)
EPI CELLS # UR: PRESENT
GLUCOSE SERPL-MCNC: 139 MG/DL — HIGH (ref 70–99)
GLUCOSE UR QL: NEGATIVE MG/DL — SIGNIFICANT CHANGE UP
HCT VFR BLD CALC: 31.6 % — LOW (ref 34.5–45)
HGB BLD-MCNC: 10.3 G/DL — LOW (ref 11.5–15.5)
IMM GRANULOCYTES NFR BLD AUTO: 1.4 % — HIGH (ref 0–0.9)
KETONES UR-MCNC: ABNORMAL MG/DL
LEUKOCYTE ESTERASE UR-ACNC: NEGATIVE — SIGNIFICANT CHANGE UP
LYMPHOCYTES # BLD AUTO: 1.62 K/UL — SIGNIFICANT CHANGE UP (ref 1–3.3)
LYMPHOCYTES # BLD AUTO: 32.7 % — SIGNIFICANT CHANGE UP (ref 13–44)
MCHC RBC-ENTMCNC: 31.5 PG — SIGNIFICANT CHANGE UP (ref 27–34)
MCHC RBC-ENTMCNC: 32.6 G/DL — SIGNIFICANT CHANGE UP (ref 32–36)
MCV RBC AUTO: 96.6 FL — SIGNIFICANT CHANGE UP (ref 80–100)
MONOCYTES # BLD AUTO: 0.5 K/UL — SIGNIFICANT CHANGE UP (ref 0–0.9)
MONOCYTES NFR BLD AUTO: 10.1 % — SIGNIFICANT CHANGE UP (ref 2–14)
NEUTROPHILS # BLD AUTO: 2.71 K/UL — SIGNIFICANT CHANGE UP (ref 1.8–7.4)
NEUTROPHILS NFR BLD AUTO: 54.6 % — SIGNIFICANT CHANGE UP (ref 43–77)
NITRITE UR-MCNC: NEGATIVE — SIGNIFICANT CHANGE UP
NRBC # BLD: 0 /100 WBCS — SIGNIFICANT CHANGE UP (ref 0–0)
PH UR: 6.5 — SIGNIFICANT CHANGE UP (ref 5–8)
PLATELET # BLD AUTO: 161 K/UL — SIGNIFICANT CHANGE UP (ref 150–400)
POTASSIUM SERPL-MCNC: 5.3 MMOL/L — SIGNIFICANT CHANGE UP (ref 3.5–5.3)
POTASSIUM SERPL-SCNC: 5.3 MMOL/L — SIGNIFICANT CHANGE UP (ref 3.5–5.3)
PROT SERPL-MCNC: 6.9 G/DL — SIGNIFICANT CHANGE UP (ref 6–8.3)
PROT UR-MCNC: ABNORMAL MG/DL
RBC # BLD: 3.27 M/UL — LOW (ref 3.8–5.2)
RBC # FLD: 15.7 % — HIGH (ref 10.3–14.5)
RBC CASTS # UR COMP ASSIST: 0 /HPF — SIGNIFICANT CHANGE UP (ref 0–4)
SODIUM SERPL-SCNC: 138 MMOL/L — SIGNIFICANT CHANGE UP (ref 135–145)
SP GR SPEC: 1.03 — HIGH (ref 1–1.03)
TROPONIN I, HIGH SENSITIVITY RESULT: 3.9 NG/L — SIGNIFICANT CHANGE UP
UROBILINOGEN FLD QL: 1 MG/DL — SIGNIFICANT CHANGE UP (ref 0.2–1)
WBC # BLD: 4.96 K/UL — SIGNIFICANT CHANGE UP (ref 3.8–10.5)
WBC # FLD AUTO: 4.96 K/UL — SIGNIFICANT CHANGE UP (ref 3.8–10.5)
WBC UR QL: 0 /HPF — SIGNIFICANT CHANGE UP (ref 0–5)

## 2024-12-31 PROCEDURE — 71045 X-RAY EXAM CHEST 1 VIEW: CPT | Mod: 26

## 2024-12-31 PROCEDURE — 99285 EMERGENCY DEPT VISIT HI MDM: CPT | Mod: 25

## 2024-12-31 PROCEDURE — 71045 X-RAY EXAM CHEST 1 VIEW: CPT

## 2024-12-31 PROCEDURE — 36415 COLL VENOUS BLD VENIPUNCTURE: CPT

## 2024-12-31 PROCEDURE — 81001 URINALYSIS AUTO W/SCOPE: CPT

## 2024-12-31 PROCEDURE — 99285 EMERGENCY DEPT VISIT HI MDM: CPT

## 2024-12-31 PROCEDURE — 80053 COMPREHEN METABOLIC PANEL: CPT

## 2024-12-31 PROCEDURE — 85025 COMPLETE CBC W/AUTO DIFF WBC: CPT

## 2024-12-31 PROCEDURE — 93005 ELECTROCARDIOGRAM TRACING: CPT

## 2024-12-31 PROCEDURE — 84484 ASSAY OF TROPONIN QUANT: CPT

## 2024-12-31 RX ORDER — DIPHENHYDRAMINE HCL 25 MG
25 CAPSULE ORAL ONCE
Refills: 0 | Status: COMPLETED | OUTPATIENT
Start: 2024-12-31 | End: 2024-12-31

## 2024-12-31 RX ORDER — ACETAMINOPHEN 500MG 500 MG/1
1000 TABLET, COATED ORAL ONCE
Refills: 0 | Status: DISCONTINUED | OUTPATIENT
Start: 2024-12-31 | End: 2024-12-31

## 2024-12-31 RX ADMIN — Medication 25 MILLIGRAM(S): at 20:59

## 2024-12-31 RX ADMIN — Medication 25 MILLIGRAM(S): at 20:27

## 2024-12-31 NOTE — ED PROVIDER NOTE - OBJECTIVE STATEMENT
39-year-old female with past medical history of factor V Leiden, DVT/PEs, ischemic cardiomyopathy, drug use/drug abuse who presents with multiple complaints.  First, patient has been recently treated for dysuria with course of antibiotics which she completed over the weekend.  Since completion, she has had worsening dysuria.  She denies any fevers, vomiting or flank pain.  She also has chest tightness.  Denies cough.  She also has a left ear pain.

## 2024-12-31 NOTE — ED PROVIDER NOTE - CLINICAL SUMMARY MEDICAL DECISION MAKING FREE TEXT BOX
39-year-old female who presented with multiple complaints.  Urinalysis shows no evidence of infection.  Will await cultures for potential treatment.  Left ear shows no evidence of infection.  Cardiac workup negative.  Patient is currently on Eliquis for DVT/PEs.   Received pain medication as requested.

## 2024-12-31 NOTE — ED ADULT TRIAGE NOTE - WEIGHT IN KG
24H events:    Patient is a 89y old Female who presents with a chief complaint of   Primary diagnosis of UTI (urinary tract infection)     Today is hospital day 1d.     PAST MEDICAL & SURGICAL HISTORY  History of ITP  Overactive bladder  Diffuse large B cell lymphoma  S/P total knee arthroplasty: bilateral    SOCIAL HISTORY:  Negative for smoking/alcohol/drug use.     ALLERGIES:  iodine (Unknown)  penicillins (Unknown)  Vicodin (Unknown)    MEDICATIONS:  STANDING MEDICATIONS  acetaminophen   Tablet .. 650 milliGRAM(s) Oral once  acyclovir   Oral Tab/Cap 400 milliGRAM(s) Oral two times a day  cefepime   IVPB 2000 milliGRAM(s) IV Intermittent every 12 hours  cholecalciferol 1000 Unit(s) Oral daily  folic acid 1 milliGRAM(s) Oral daily  latanoprost 0.005% Ophthalmic Solution 1 Drop(s) Both EYES at bedtime    PRN MEDICATIONS    VITALS:   T(F): 98  HR: 91  BP: 153/67  RR: 18  SpO2: 97%    LABS:                        9.0    2.63  )-----------(        ( 2020 18:26 )             28.8     06-22    137  |  99  |  21<H>  ----------------------------<  139<H>  4.3   |  23  |  1.2    Ca    9.3      2020 18:26    TPro  5.9<L>  /  Alb  4.1  /  TBili  0.4  /  DBili  <0.2  /  AST  27  /  ALT  21  /  AlkPhos  61  06-22    PT/INR - ( 2020 07:14 )   PT: 8.70 sec;   INR: 0.76 ratio           Urinalysis Basic - ( 2020 20:09 )    Color: Yellow / Appearance: Clear / S.025 / pH: x  Gluc: x / Ketone: Negative  / Bili: Negative / Urobili: <2 mg/dL   Blood: x / Protein: Trace / Nitrite: Negative   Leuk Esterase: Large / RBC: 3 /HPF / WBC 11 /HPF   Sq Epi: x / Non Sq Epi: 3 /HPF / Bacteria: Negative        Lactate, Blood: 1.7 mmol/L (20 @ 22:03)      PHYSICAL EXAM:  CONSTITUTIONAL: FEVER and CHILLS  EYES/ENT: No visual changes;  No vertigo or throat pain   NECK: No pain or stiffness  RESPIRATORY: No cough, wheezing, hemoptysis; No shortness of breath  CARDIOVASCULAR: No chest pain or palpitations  GASTROINTESTINAL: No abdominal or epigastric pain. No nausea, vomiting, or hematemesis; No diarrhea or constipation. No melena or hematochezia.  GENITOURINARY: chronic urinary frequency.   NEUROLOGICAL: No numbness or weakness  SKIN: chronic easy bruising. 139.7

## 2024-12-31 NOTE — ED PROVIDER NOTE - NSFOLLOWUPINSTRUCTIONS_ED_ALL_ED_FT
You were evaluated for multiple complaints.  Your urinalysis shows no evidence of infection.  Your left ear examination shows no evidence of infection.  Your cardiac workup was negative including your EKG, chest x-ray and cardiac marker.  Please follow-up with your primary care physician.

## 2024-12-31 NOTE — ED PROVIDER NOTE - PATIENT PORTAL LINK FT
You can access the FollowMyHealth Patient Portal offered by [facility] by registering at the following website: [email or web address]. By joining [employer]’s FollowMyHealth portal, you will also be able to view your health information using other applications (apps) compatible with our system.

## 2025-01-01 ENCOUNTER — NON-APPOINTMENT (OUTPATIENT)
Age: 40
End: 2025-01-01

## 2025-01-01 ENCOUNTER — EMERGENCY (EMERGENCY)
Facility: HOSPITAL | Age: 40
LOS: 1 days | Discharge: HOME | End: 2025-01-01
Attending: STUDENT IN AN ORGANIZED HEALTH CARE EDUCATION/TRAINING PROGRAM
Payer: MEDICARE

## 2025-01-01 VITALS
OXYGEN SATURATION: 99 % | DIASTOLIC BLOOD PRESSURE: 97 MMHG | RESPIRATION RATE: 18 BRPM | HEART RATE: 105 BPM | WEIGHT: 293 LBS | SYSTOLIC BLOOD PRESSURE: 124 MMHG | HEIGHT: 71 IN | TEMPERATURE: 98 F

## 2025-01-01 DIAGNOSIS — Z90.49 ACQUIRED ABSENCE OF OTHER SPECIFIED PARTS OF DIGESTIVE TRACT: Chronic | ICD-10-CM

## 2025-01-01 DIAGNOSIS — F79 UNSPECIFIED INTELLECTUAL DISABILITIES: ICD-10-CM

## 2025-01-01 LAB
ALBUMIN SERPL ELPH-MCNC: 3.4 G/DL — LOW (ref 3.5–5)
ALP SERPL-CCNC: 69 U/L — SIGNIFICANT CHANGE UP (ref 40–120)
ALT FLD-CCNC: 61 U/L DA — HIGH (ref 10–60)
ANION GAP SERPL CALC-SCNC: 8 MMOL/L — SIGNIFICANT CHANGE UP (ref 5–17)
AST SERPL-CCNC: 27 U/L — SIGNIFICANT CHANGE UP (ref 10–40)
BASOPHILS # BLD AUTO: 0.01 K/UL — SIGNIFICANT CHANGE UP (ref 0–0.2)
BASOPHILS NFR BLD AUTO: 0.2 % — SIGNIFICANT CHANGE UP (ref 0–2)
BILIRUB SERPL-MCNC: 0.2 MG/DL — SIGNIFICANT CHANGE UP (ref 0.2–1.2)
BUN SERPL-MCNC: 13 MG/DL — SIGNIFICANT CHANGE UP (ref 7–18)
CALCIUM SERPL-MCNC: 9.3 MG/DL — SIGNIFICANT CHANGE UP (ref 8.4–10.5)
CHLORIDE SERPL-SCNC: 108 MMOL/L — SIGNIFICANT CHANGE UP (ref 96–108)
CO2 SERPL-SCNC: 22 MMOL/L — SIGNIFICANT CHANGE UP (ref 22–31)
CREAT SERPL-MCNC: 0.72 MG/DL — SIGNIFICANT CHANGE UP (ref 0.5–1.3)
EGFR: 110 ML/MIN/1.73M2 — SIGNIFICANT CHANGE UP
EGFR: 110 ML/MIN/1.73M2 — SIGNIFICANT CHANGE UP
EOSINOPHIL # BLD AUTO: 0.03 K/UL — SIGNIFICANT CHANGE UP (ref 0–0.5)
EOSINOPHIL NFR BLD AUTO: 0.6 % — SIGNIFICANT CHANGE UP (ref 0–6)
ETHANOL SERPL-MCNC: 6 MG/DL — SIGNIFICANT CHANGE UP (ref 0–10)
GLUCOSE SERPL-MCNC: 113 MG/DL — HIGH (ref 70–99)
HCG SERPL-ACNC: <1 MIU/ML — SIGNIFICANT CHANGE UP
HCT VFR BLD CALC: 31.4 % — LOW (ref 34.5–45)
HGB BLD-MCNC: 10.3 G/DL — LOW (ref 11.5–15.5)
IMM GRANULOCYTES NFR BLD AUTO: 1 % — HIGH (ref 0–0.9)
LYMPHOCYTES # BLD AUTO: 1.52 K/UL — SIGNIFICANT CHANGE UP (ref 1–3.3)
LYMPHOCYTES # BLD AUTO: 29.6 % — SIGNIFICANT CHANGE UP (ref 13–44)
MCHC RBC-ENTMCNC: 31.3 PG — SIGNIFICANT CHANGE UP (ref 27–34)
MCHC RBC-ENTMCNC: 32.8 G/DL — SIGNIFICANT CHANGE UP (ref 32–36)
MCV RBC AUTO: 95.4 FL — SIGNIFICANT CHANGE UP (ref 80–100)
MONOCYTES # BLD AUTO: 0.71 K/UL — SIGNIFICANT CHANGE UP (ref 0–0.9)
MONOCYTES NFR BLD AUTO: 13.8 % — SIGNIFICANT CHANGE UP (ref 2–14)
NEUTROPHILS # BLD AUTO: 2.82 K/UL — SIGNIFICANT CHANGE UP (ref 1.8–7.4)
NEUTROPHILS NFR BLD AUTO: 54.8 % — SIGNIFICANT CHANGE UP (ref 43–77)
NRBC # BLD: 0 /100 WBCS — SIGNIFICANT CHANGE UP (ref 0–0)
NRBC BLD-RTO: 0 /100 WBCS — SIGNIFICANT CHANGE UP (ref 0–0)
PLATELET # BLD AUTO: 169 K/UL — SIGNIFICANT CHANGE UP (ref 150–400)
POTASSIUM SERPL-MCNC: 3.8 MMOL/L — SIGNIFICANT CHANGE UP (ref 3.5–5.3)
POTASSIUM SERPL-SCNC: 3.8 MMOL/L — SIGNIFICANT CHANGE UP (ref 3.5–5.3)
PROT SERPL-MCNC: 7.1 G/DL — SIGNIFICANT CHANGE UP (ref 6–8.3)
RBC # BLD: 3.29 M/UL — LOW (ref 3.8–5.2)
RBC # FLD: 15.8 % — HIGH (ref 10.3–14.5)
SODIUM SERPL-SCNC: 138 MMOL/L — SIGNIFICANT CHANGE UP (ref 135–145)
WBC # BLD: 5.14 K/UL — SIGNIFICANT CHANGE UP (ref 3.8–10.5)
WBC # FLD AUTO: 5.14 K/UL — SIGNIFICANT CHANGE UP (ref 3.8–10.5)

## 2025-01-01 PROCEDURE — 90792 PSYCH DIAG EVAL W/MED SRVCS: CPT | Mod: 2W

## 2025-01-01 RX ORDER — HALOPERIDOL 10 MG/1
5 TABLET ORAL ONCE
Refills: 0 | Status: COMPLETED | OUTPATIENT
Start: 2025-01-01 | End: 2025-01-01

## 2025-01-01 RX ORDER — RISPERIDONE 4 MG
5 TABLET ORAL ONCE
Refills: 0 | Status: COMPLETED | OUTPATIENT
Start: 2025-01-01 | End: 2025-01-01

## 2025-01-01 RX ORDER — CYCLOBENZAPRINE HYDROCHLORIDE 15 MG/1
5 CAPSULE, EXTENDED RELEASE ORAL ONCE
Refills: 0 | Status: COMPLETED | OUTPATIENT
Start: 2025-01-01 | End: 2025-01-01

## 2025-01-01 RX ORDER — MELATONIN 5 MG
5 TABLET ORAL ONCE
Refills: 0 | Status: COMPLETED | OUTPATIENT
Start: 2025-01-01 | End: 2025-01-01

## 2025-01-01 RX ORDER — DIPHENHYDRAMINE HCL 12.5MG/5ML
50 ELIXIR ORAL ONCE
Refills: 0 | Status: COMPLETED | OUTPATIENT
Start: 2025-01-01 | End: 2025-01-01

## 2025-01-01 RX ORDER — ALPRAZOLAM 0.5 MG
0.25 TABLET, EXTENDED RELEASE 24 HR ORAL ONCE
Refills: 0 | Status: DISCONTINUED | OUTPATIENT
Start: 2025-01-01 | End: 2025-01-01

## 2025-01-01 RX ORDER — LORAZEPAM 4 MG/ML
2 VIAL (ML) INJECTION ONCE
Refills: 0 | Status: DISCONTINUED | OUTPATIENT
Start: 2025-01-01 | End: 2025-01-01

## 2025-01-01 RX ORDER — QUETIAPINE FUMARATE 25 MG/1
25 TABLET ORAL ONCE
Refills: 0 | Status: COMPLETED | OUTPATIENT
Start: 2025-01-01 | End: 2025-01-01

## 2025-01-01 RX ORDER — ALPRAZOLAM 0.5 MG
0.5 TABLET, EXTENDED RELEASE 24 HR ORAL ONCE
Refills: 0 | Status: DISCONTINUED | OUTPATIENT
Start: 2025-01-01 | End: 2025-01-01

## 2025-01-01 RX ORDER — APIXABAN 2.5 MG/1
5 TABLET, FILM COATED ORAL ONCE
Refills: 0 | Status: COMPLETED | OUTPATIENT
Start: 2025-01-01 | End: 2025-01-01

## 2025-01-01 RX ORDER — ACETAMINOPHEN 500 MG/5ML
975 LIQUID (ML) ORAL ONCE
Refills: 0 | Status: COMPLETED | OUTPATIENT
Start: 2025-01-01 | End: 2025-01-01

## 2025-01-01 RX ADMIN — CYCLOBENZAPRINE HYDROCHLORIDE 5 MILLIGRAM(S): 15 CAPSULE, EXTENDED RELEASE ORAL at 23:22

## 2025-01-01 RX ADMIN — QUETIAPINE FUMARATE 25 MILLIGRAM(S): 25 TABLET ORAL at 23:19

## 2025-01-01 RX ADMIN — Medication 5 MILLIGRAM(S): at 23:21

## 2025-01-01 RX ADMIN — Medication 50 MILLIGRAM(S): at 19:52

## 2025-01-01 RX ADMIN — Medication 50 MILLIGRAM(S): at 23:21

## 2025-01-01 RX ADMIN — Medication 0.25 MILLIGRAM(S): at 22:14

## 2025-01-01 RX ADMIN — Medication 2 MILLIGRAM(S): at 19:52

## 2025-01-01 RX ADMIN — Medication 500 MILLIGRAM(S): at 23:20

## 2025-01-01 RX ADMIN — APIXABAN 5 MILLIGRAM(S): 2.5 TABLET, FILM COATED ORAL at 23:20

## 2025-01-01 RX ADMIN — Medication 0.1 MILLIGRAM(S): at 23:19

## 2025-01-01 RX ADMIN — HALOPERIDOL 5 MILLIGRAM(S): 10 TABLET ORAL at 19:52

## 2025-01-07 ENCOUNTER — NON-APPOINTMENT (OUTPATIENT)
Age: 40
End: 2025-01-07

## 2025-01-07 ENCOUNTER — EMERGENCY (EMERGENCY)
Facility: HOSPITAL | Age: 40
LOS: 1 days | Discharge: LEFT BEFORE DISCHARGE | End: 2025-01-07
Attending: STUDENT IN AN ORGANIZED HEALTH CARE EDUCATION/TRAINING PROGRAM | Admitting: STUDENT IN AN ORGANIZED HEALTH CARE EDUCATION/TRAINING PROGRAM
Payer: MEDICARE

## 2025-01-07 VITALS
SYSTOLIC BLOOD PRESSURE: 140 MMHG | TEMPERATURE: 99 F | RESPIRATION RATE: 18 BRPM | OXYGEN SATURATION: 100 % | DIASTOLIC BLOOD PRESSURE: 86 MMHG | HEART RATE: 115 BPM

## 2025-01-07 VITALS
RESPIRATION RATE: 18 BRPM | TEMPERATURE: 98 F | HEART RATE: 100 BPM | DIASTOLIC BLOOD PRESSURE: 87 MMHG | HEIGHT: 71 IN | SYSTOLIC BLOOD PRESSURE: 119 MMHG | OXYGEN SATURATION: 99 % | WEIGHT: 293 LBS

## 2025-01-07 DIAGNOSIS — Z90.49 ACQUIRED ABSENCE OF OTHER SPECIFIED PARTS OF DIGESTIVE TRACT: Chronic | ICD-10-CM

## 2025-01-07 LAB
ALBUMIN SERPL ELPH-MCNC: 4.3 G/DL — SIGNIFICANT CHANGE UP (ref 3.3–5)
ALP SERPL-CCNC: 64 U/L — SIGNIFICANT CHANGE UP (ref 40–120)
ALT FLD-CCNC: 55 U/L — HIGH (ref 4–33)
ANION GAP SERPL CALC-SCNC: 14 MMOL/L — SIGNIFICANT CHANGE UP (ref 7–14)
APPEARANCE UR: ABNORMAL
AST SERPL-CCNC: 28 U/L — SIGNIFICANT CHANGE UP (ref 4–32)
BACTERIA # UR AUTO: ABNORMAL /HPF
BASOPHILS # BLD AUTO: 0.01 K/UL — SIGNIFICANT CHANGE UP (ref 0–0.2)
BASOPHILS NFR BLD AUTO: 0.1 % — SIGNIFICANT CHANGE UP (ref 0–2)
BILIRUB SERPL-MCNC: 0.2 MG/DL — SIGNIFICANT CHANGE UP (ref 0.2–1.2)
BILIRUB UR-MCNC: ABNORMAL
BUN SERPL-MCNC: 10 MG/DL — SIGNIFICANT CHANGE UP (ref 7–23)
CALCIUM SERPL-MCNC: 9.5 MG/DL — SIGNIFICANT CHANGE UP (ref 8.4–10.5)
CAST: 3 /LPF — SIGNIFICANT CHANGE UP (ref 0–4)
CHLORIDE SERPL-SCNC: 103 MMOL/L — SIGNIFICANT CHANGE UP (ref 98–107)
CO2 SERPL-SCNC: 23 MMOL/L — SIGNIFICANT CHANGE UP (ref 22–31)
COLOR SPEC: SIGNIFICANT CHANGE UP
CREAT SERPL-MCNC: 0.81 MG/DL — SIGNIFICANT CHANGE UP (ref 0.5–1.3)
DIFF PNL FLD: NEGATIVE — SIGNIFICANT CHANGE UP
EGFR: 96 ML/MIN/1.73M2 — SIGNIFICANT CHANGE UP
EOSINOPHIL # BLD AUTO: 0.04 K/UL — SIGNIFICANT CHANGE UP (ref 0–0.5)
EOSINOPHIL NFR BLD AUTO: 0.6 % — SIGNIFICANT CHANGE UP (ref 0–6)
FLUAV AG NPH QL: SIGNIFICANT CHANGE UP
FLUBV AG NPH QL: SIGNIFICANT CHANGE UP
GAS PNL BLDV: SIGNIFICANT CHANGE UP
GLUCOSE SERPL-MCNC: 74 MG/DL — SIGNIFICANT CHANGE UP (ref 70–99)
GLUCOSE UR QL: NEGATIVE MG/DL — SIGNIFICANT CHANGE UP
HCG SERPL-ACNC: <1 MIU/ML — SIGNIFICANT CHANGE UP
HCT VFR BLD CALC: 34.7 % — SIGNIFICANT CHANGE UP (ref 34.5–45)
HGB BLD-MCNC: 10.6 G/DL — LOW (ref 11.5–15.5)
IANC: 4.05 K/UL — SIGNIFICANT CHANGE UP (ref 1.8–7.4)
IMM GRANULOCYTES NFR BLD AUTO: 0.6 % — SIGNIFICANT CHANGE UP (ref 0–0.9)
KETONES UR-MCNC: 15 MG/DL
LEUKOCYTE ESTERASE UR-ACNC: NEGATIVE — SIGNIFICANT CHANGE UP
LIDOCAIN IGE QN: 17 U/L — SIGNIFICANT CHANGE UP (ref 7–60)
LYMPHOCYTES # BLD AUTO: 2.39 K/UL — SIGNIFICANT CHANGE UP (ref 1–3.3)
LYMPHOCYTES # BLD AUTO: 33.1 % — SIGNIFICANT CHANGE UP (ref 13–44)
MCHC RBC-ENTMCNC: 29.5 PG — SIGNIFICANT CHANGE UP (ref 27–34)
MCHC RBC-ENTMCNC: 30.5 G/DL — LOW (ref 32–36)
MCV RBC AUTO: 96.7 FL — SIGNIFICANT CHANGE UP (ref 80–100)
MONOCYTES # BLD AUTO: 0.7 K/UL — SIGNIFICANT CHANGE UP (ref 0–0.9)
MONOCYTES NFR BLD AUTO: 9.7 % — SIGNIFICANT CHANGE UP (ref 2–14)
NEUTROPHILS # BLD AUTO: 4.05 K/UL — SIGNIFICANT CHANGE UP (ref 1.8–7.4)
NEUTROPHILS NFR BLD AUTO: 55.9 % — SIGNIFICANT CHANGE UP (ref 43–77)
NITRITE UR-MCNC: NEGATIVE — SIGNIFICANT CHANGE UP
NRBC # BLD: 0 /100 WBCS — SIGNIFICANT CHANGE UP (ref 0–0)
NRBC # FLD: 0 K/UL — SIGNIFICANT CHANGE UP (ref 0–0)
PH UR: 6 — SIGNIFICANT CHANGE UP (ref 5–8)
PLATELET # BLD AUTO: 198 K/UL — SIGNIFICANT CHANGE UP (ref 150–400)
POTASSIUM SERPL-MCNC: 4.1 MMOL/L — SIGNIFICANT CHANGE UP (ref 3.5–5.3)
POTASSIUM SERPL-SCNC: 4.1 MMOL/L — SIGNIFICANT CHANGE UP (ref 3.5–5.3)
PROT SERPL-MCNC: 7.3 G/DL — SIGNIFICANT CHANGE UP (ref 6–8.3)
PROT UR-MCNC: 100 MG/DL
RBC # BLD: 3.59 M/UL — LOW (ref 3.8–5.2)
RBC # FLD: 16.4 % — HIGH (ref 10.3–14.5)
RBC CASTS # UR COMP ASSIST: 9 /HPF — HIGH (ref 0–4)
REVIEW: SIGNIFICANT CHANGE UP
RSV RNA NPH QL NAA+NON-PROBE: SIGNIFICANT CHANGE UP
SARS-COV-2 RNA SPEC QL NAA+PROBE: SIGNIFICANT CHANGE UP
SODIUM SERPL-SCNC: 140 MMOL/L — SIGNIFICANT CHANGE UP (ref 135–145)
SP GR SPEC: 1.03 — HIGH (ref 1–1.03)
SQUAMOUS # UR AUTO: 83 /HPF — HIGH (ref 0–5)
TROPONIN T, HIGH SENSITIVITY RESULT: <6 NG/L — SIGNIFICANT CHANGE UP
UROBILINOGEN FLD QL: 1 MG/DL — SIGNIFICANT CHANGE UP (ref 0.2–1)
WBC # BLD: 7.23 K/UL — SIGNIFICANT CHANGE UP (ref 3.8–10.5)
WBC # FLD AUTO: 7.23 K/UL — SIGNIFICANT CHANGE UP (ref 3.8–10.5)
WBC UR QL: 5 /HPF — SIGNIFICANT CHANGE UP (ref 0–5)

## 2025-01-07 PROCEDURE — 99284 EMERGENCY DEPT VISIT MOD MDM: CPT

## 2025-01-07 RX ORDER — DROPERIDOL 2.5 MG/ML
0.62 INJECTION, SOLUTION INTRAMUSCULAR; INTRAVENOUS ONCE
Refills: 0 | Status: DISCONTINUED | OUTPATIENT
Start: 2025-01-07 | End: 2025-01-07

## 2025-01-07 RX ORDER — DIPHENHYDRAMINE HCL 25 MG
25 TABLET ORAL ONCE
Refills: 0 | Status: COMPLETED | OUTPATIENT
Start: 2025-01-07 | End: 2025-01-07

## 2025-01-07 RX ORDER — METOCLOPRAMIDE 10 MG/1
10 TABLET ORAL ONCE
Refills: 0 | Status: COMPLETED | OUTPATIENT
Start: 2025-01-07 | End: 2025-01-07

## 2025-01-07 RX ORDER — SODIUM CHLORIDE 9 MG/ML
1000 INJECTION, SOLUTION INTRAMUSCULAR; INTRAVENOUS; SUBCUTANEOUS ONCE
Refills: 0 | Status: COMPLETED | OUTPATIENT
Start: 2025-01-07 | End: 2025-01-07

## 2025-01-07 RX ORDER — METOCLOPRAMIDE 10 MG/1
10 TABLET ORAL ONCE
Refills: 0 | Status: DISCONTINUED | OUTPATIENT
Start: 2025-01-07 | End: 2025-01-07

## 2025-01-07 RX ADMIN — SODIUM CHLORIDE 1000 MILLILITER(S): 9 INJECTION, SOLUTION INTRAMUSCULAR; INTRAVENOUS; SUBCUTANEOUS at 19:14

## 2025-01-07 RX ADMIN — METOCLOPRAMIDE 10 MILLIGRAM(S): 10 TABLET ORAL at 19:14

## 2025-01-07 RX ADMIN — Medication 25 MILLIGRAM(S): at 19:14

## 2025-01-07 NOTE — ED ADULT NURSE NOTE - OBJECTIVE STATEMENT
Pt is aaox4 and follows command. Pt c/o generalized abdominal pain. Denies n/v/d. Labs drawn as ordered. Plan of care ongoing.

## 2025-01-07 NOTE — ED ADULT NURSE REASSESSMENT NOTE - NS ED NURSE REASSESS COMMENT FT1
Received from break ALEYDA Zeng. Pt sitting on edge of stretcher restless. Pt c/o nausea, dizziness, diarrhea, and episodes of nonbloody emesis x 2 days. Pt states symptoms started yesterday and have not resided. Pt complaining of pruritus all over the body and 10/10, left, lower quadrant pain. Pt states taking 3 tablets of extra strength Tylenol this morning.     Respirations even and unlabored. AxOx4. Ambulatory independently. Lung sounds clear to ausculation. S1 and S2 heard. Rate and rhythm regular. (+) TTP left lower quadrant. Normoactive bowel sounds heard in all four quadrants. Safety maintained. Concerns and questions answered. Received from break ALEYDA Zeng. Pt sitting on edge of stretcher restless. Pt c/o nausea, dizziness, diarrhea, and episodes of nonbloody emesis x 2 days. Pt states symptoms started yesterday and have not resided. Pt complaining of pruritus all over the body and 10/10, left, lower quadrant pain. Pt states taking 3 tablets of extra strength Tylenol this morning. Pt refuses to get undressed.    Respirations even and unlabored. AxOx4. Ambulatory independently. Lung sounds clear to ausculation. S1 and S2 heard. Rate and rhythm regular. (+) TTP left lower quadrant. Normoactive bowel sounds heard in all four quadrants. Safety maintained. Concerns and questions answered.

## 2025-01-07 NOTE — PROVIDER CONTACT NOTE (OTHER) - ASSESSMENT
Per Joni, pt is a resident and can return.  States she can travel independently via taxi.  Discussed with provider, who is aware and in agreement.  Pt aware and reports wanting to travel via taxi.

## 2025-01-07 NOTE — ED PROVIDER NOTE - PROGRESS NOTE DETAILS
Raysa Doss M.D. (Resident Physician): Pt refusing the CXR and ECG. Wants to leave. Blood work and UA non-actionable. Will sign out AMA.

## 2025-01-07 NOTE — ED PROVIDER NOTE - PATIENT PORTAL LINK FT
You can access the FollowMyHealth Patient Portal offered by [facility] by registering at the following website: [email or web address]. By joining [employer]’s FollowMyHealth portal, you will also be able to view your health information using other applications (apps) compatible with our system.

## 2025-01-07 NOTE — ED PROVIDER NOTE - ATTENDING CONTRIBUTION TO CARE
39-year-old female with a PMhx appendectomy, Factor V Leiden Mutation dx in 2016 with multiple DVTs/PE (on eliquis); psychogenic nonepileptic seizures, vitamin D deficiency, hx of DVT and PE, chronic ischemic heart disease, IBS, GERD, PID, endometriosis, headache syndrome, asthma, hx of shoulder abscess with drainage; hypothyroidism who presents to the complaining of multiple diarrhea yesterday morning associated with nausea and left lower quadrant abdominal pain.  She states abdominal pain has been constant in nature and nonradiating.  She also endorsed vague midsternal chest pain earlier today that is since resolved.  Patient endorses chills.  Denies any fevers, diaphoresis, numbness, weakness, shortness of breath, headache, neck pain, trauma.    Complex care note reviewed:    on lifelong disability, domiciled in [facility] adult home, has a court-appointed legal guardian; lifelong Affective Dysregulation/Intermittent Explosive Disorder, Personality Disorder, Mild Intellectual disability, hx of intrauterine exposure to cocaine, hx of remote Cocaine use disorder, past psychiatric admissions, 2 prior suicidal gestures/self-injurious behavior, + legal history, EXTENSIVE amount of ED visits, many medical admissions (, [facility], [facility], other health system hospitals);  aggressive, threatening, indimidating to peers/staff  PMH: Factor V Leiden Mutation dx in 2016 with multiple DVTs/PE; psychogenic nonepileptic seizures, vitamin D deficiency, hx of DVT and PE, chronic ischemic heart disease, IBS, GERD, PID, endometriosis, headache syndrome, asthma, hx of shoulder abscess with drainage; hypothyroidism. Recent EEGs: psychogenic nonepileptic attacks or functional seizures. Mild diffuse cerebral dysfunction, No epileptiform pattern or seizure observed. MRI head 3/2/22: few scattered nonspc foci of abnormal signal in periventricular/subcortical white matter. DONOVAN 8/22/22: normal study   MEDS: trileptal 150mg bid and  Ativan 0.5mg BID PRN for anxiety; trileptal 150mg PO BID; Eliquis 2.5mg PO BID or 5mg PO bid; Thorazine 50mg QID (7a, 11a, 4pm, 8pm); Seroquel 200mg 8am, 8pm, and 50mg 12 noon; Levothyroxine 75mg daily  ISSUE 1: recurrent admission for “seizures” despite consistently negative recent Neurological work-up and documented “pseudoseizures” ; history of feigning seizure symptoms/signs in order to get medical admission and not be sent back to her residence.  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 ISSUE 3: when admitted, Code Greys, throwing objects, aggression/yelling/curing; posturing/physical intimidation when needs not met.  ------  CT of the abdomen performed on 11/25/2024 shows no bowel obstruction, moderate stool burden, no right or left hydroureteronephrosis or obstructive urinary tract calculus, no nephrolithiasis, there is diffuse hepatic steatosis.    GENERAL: NAD, activity normal for age, well developed/ well nourished, no cyanosis, pallor, or diaphoresis.  EYES: lids/conjunctiva normal.   EARS/NOSE/THROAT: Mucous membranes moist, nares normal  HEAD/NECK: normocephalic atraumatic, no facial trauma, neck is supple.  RESPIRATORY: respiratory effort normal, speaks in full sentences, no tripod position, no accessory muscle use. Lungs clear to auscultation without rhonchi, wheezes, rales  CARDIAC: Regular rate and rhythm without murmurs, rubs or gallops, no peripheral edema. 2+ radial/PT and DP pulses bilaterally  ABDOMINAL: minimally tender to the LLQ without rebound or guarding. Soft, ND/NT. No evidence of fluid wave. No pulsatile masses on exam, rebound tenderness,  sign or pain over Mcburney's point.  MUSCLES/EXTREMITIES: FROM in all extremities, no joint swelling or tenderness  NEUROLOGICAL: Speech is clear and appropriate. Normal level of consciousness.   PSYCH: Normal mood and affect. Judgement/competence is appropriate  ---------- 39-year-old female with a PMhx appendectomy, Factor V Leiden Mutation dx in 2016 with multiple DVTs/PE (on eliquis); psychogenic nonepileptic seizures, vitamin D deficiency, hx of DVT and PE, chronic ischemic heart disease, IBS, GERD, PID, endometriosis, headache syndrome, asthma, hx of shoulder abscess with drainage; hypothyroidism who presents to the complaining of multiple diarrhea yesterday morning associated with nausea and left lower quadrant abdominal pain.  She states abdominal pain has been constant in nature and nonradiating.  She also endorsed vague midsternal chest pain earlier today that is since resolved.  Patient endorses chills.  Denies any fevers, diaphoresis, numbness, weakness, shortness of breath, headache, neck pain, trauma.    Complex care note reviewed:    on lifelong disability, domiciled in [facility] home, has a court-appointed legal guardian; lifelong Affective Dysregulation/Intermittent Explosive Disorder, Personality Disorder, Mild Intellectual disability, hx of intrauterine exposure to cocaine, hx of remote Cocaine use disorder, past psychiatric admissions, 2 prior suicidal gestures/self-injurious behavior, + legal history, EXTENSIVE amount of ED visits, many medical admissions (, [facility], [facility], other health system hospitals);  aggressive, threatening, indimidating to peers/staff  PMH: Factor V Leiden Mutation dx in 2016 with multiple DVTs/PE; psychogenic nonepileptic seizures, vitamin D deficiency, hx of DVT and PE, chronic ischemic heart disease, IBS, GERD, PID, endometriosis, headache syndrome, asthma, hx of shoulder abscess with drainage; hypothyroidism. Recent EEGs: psychogenic nonepileptic attacks or functional seizures. Mild diffuse cerebral dysfunction, No epileptiform pattern or seizure observed. MRI head 3/2/22: few scattered nonspc foci of abnormal signal in periventricular/subcortical white matter. DONOVAN 8/22/22: normal study   MEDS: trileptal 150mg bid and  Ativan 0.5mg BID PRN for anxiety; trileptal 150mg PO BID; Eliquis 2.5mg PO BID or 5mg PO bid; Thorazine 50mg QID (7a, 11a, 4pm, 8pm); Seroquel 200mg 8am, 8pm, and 50mg 12 noon; Levothyroxine 75mg daily  ISSUE 1: recurrent admission for “seizures” despite consistently negative recent Neurological work-up and documented “pseudoseizures” ; history of feigning seizure symptoms/signs in order to get medical admission and not be sent back to her residence.  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 ISSUE 3: when admitted, Code Greys, throwing objects, aggression/yelling/curing; posturing/physical intimidation when needs not met.  ------  CT of the abdomen performed on 11/25/2024 shows no bowel obstruction, moderate stool burden, no right or left hydroureteronephrosis or obstructive urinary tract calculus, no nephrolithiasis, there is diffuse hepatic steatosis.    GENERAL: NAD, activity normal for age, well developed/ well nourished, no cyanosis, pallor, or diaphoresis.  EYES: lids/conjunctiva normal.   EARS/NOSE/THROAT: Mucous membranes moist, nares normal  HEAD/NECK: normocephalic atraumatic, no facial trauma, neck is supple.  RESPIRATORY: respiratory effort normal, speaks in full sentences, no tripod position, no accessory muscle use. Lungs clear to auscultation without rhonchi, wheezes, rales  CARDIAC: Regular rate and rhythm without murmurs, rubs or gallops, no peripheral edema. 2+ radial/PT and DP pulses bilaterally  ABDOMINAL: minimally tender to the LLQ without rebound or guarding. Soft, ND/NT. No evidence of fluid wave. No pulsatile masses on exam, rebound tenderness,  sign or pain over Mcburney's point.  MUSCLES/EXTREMITIES: FROM in all extremities, no joint swelling or tenderness  NEUROLOGICAL: Speech is clear and appropriate. Normal level of consciousness.   PSYCH: Normal mood and affect. Judgement/competence is appropriate  ----------    Patient's abdominal pain is rather nonspecific.  I have a low suspicion for pancreatitis, ovarian etiology, pyelonephritis, pneumonia, PE.  I do suspect gastroenteritis given nausea vomiting diarrhea and abdominal pain.  While she was here patient was offered Reglan which she took with some improvement.  Prior to the Reglan she stated that she was "itching".  She had no hives, lungs were clear.  Patient requested Benadryl.  She is given 25 mg with some improvement.    Patient's blood work shows stable anemia, no leukocytosis.  Normal renal function.  No electro abnormalities.  hCG negative.  Troponin less than 6 (low suspicion for ACS).  Lactate is not elevated.  UA without evidence of a UTI.    Patient refused an EKG and chest x-ray.  Risks of death from disability were discussed with the patient, she was still refusing.  She does have decision-making capacity.  Patient signed out AGAINST MEDICAL ADVICE. 39-year-old female with a PMhx appendectomy, Factor V Leiden Mutation dx in 2016 with multiple DVTs/PE (on eliquis); psychogenic nonepileptic seizures, vitamin D deficiency, hx of DVT and PE, chronic ischemic heart disease, IBS, GERD, PID, endometriosis, headache syndrome, asthma, hx of shoulder abscess with drainage; hypothyroidism who presents to the complaining of multiple diarrhea yesterday morning associated with nausea and left lower quadrant abdominal pain.  She states abdominal pain has been constant in nature and nonradiating.  She also endorsed vague midsternal chest pain earlier today that is since resolved.  Patient endorses chills.  Denies any fevers, diaphoresis, numbness, weakness, shortness of breath, headache, neck pain, trauma.    Complex care note reviewed:    on lifelong disability, domiciled in [facility] home, has a court-appointed legal guardian; lifelong Affective Dysregulation/Intermittent Explosive Disorder, Personality Disorder, Mild Intellectual disability, hx of intrauterine exposure to cocaine, hx of remote Cocaine use disorder, past psychiatric admissions, 2 prior suicidal gestures/self-injurious behavior, + legal history, EXTENSIVE amount of ED visits, many medical admissions (, [facility], [facility], other health system hospitals);  aggressive, threatening, indimidating to peers/staff  PMH: Factor V Leiden Mutation dx in 2016 with multiple DVTs/PE; psychogenic nonepileptic seizures, vitamin D deficiency, hx of DVT and PE, chronic ischemic heart disease, IBS, GERD, PID, endometriosis, headache syndrome, asthma, hx of shoulder abscess with drainage; hypothyroidism. Recent EEGs: psychogenic nonepileptic attacks or functional seizures. Mild diffuse cerebral dysfunction, No epileptiform pattern or seizure observed. MRI head 3/2/22: few scattered nonspc foci of abnormal signal in periventricular/subcortical white matter. DONOVAN 8/22/22: normal study   MEDS: trileptal 150mg bid and  Ativan 0.5mg BID PRN for anxiety; trileptal 150mg PO BID; Eliquis 2.5mg PO BID or 5mg PO bid; Thorazine 50mg QID (7a, 11a, 4pm, 8pm); Seroquel 200mg 8am, 8pm, and 50mg 12 noon; Levothyroxine 75mg daily  ISSUE 1: recurrent admission for “seizures” despite consistently negative recent Neurological work-up and documented “pseudoseizures” ; history of feigning seizure symptoms/signs in order to get medical admission and not be sent back to her residence.  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 ISSUE 3: when admitted, Code Greys, throwing objects, aggression/yelling/curing; posturing/physical intimidation when needs not met.  ------  CT of the abdomen performed on 11/25/2024 shows no bowel obstruction, moderate stool burden, no right or left hydroureteronephrosis or obstructive urinary tract calculus, no nephrolithiasis, there is diffuse hepatic steatosis.    GENERAL: NAD, activity normal for age, well developed/ well nourished, no cyanosis, pallor, or diaphoresis.  EYES: lids/conjunctiva normal.   EARS/NOSE/THROAT: Mucous membranes moist, nares normal  HEAD/NECK: normocephalic atraumatic, no facial trauma, neck is supple.  RESPIRATORY: respiratory effort normal, speaks in full sentences, no tripod position, no accessory muscle use. Lungs clear to auscultation without rhonchi, wheezes, rales  CARDIAC: Regular rate and rhythm without murmurs, rubs or gallops, no peripheral edema. 2+ radial/PT and DP pulses bilaterally  ABDOMINAL: minimally tender to the LLQ without rebound or guarding. Soft, ND/NT. No evidence of fluid wave. No pulsatile masses on exam, rebound tenderness,  sign or pain over Mcburney's point.  MUSCLES/EXTREMITIES: FROM in all extremities, no joint swelling or tenderness  NEUROLOGICAL: Speech is clear and appropriate. Normal level of consciousness.   PSYCH: Normal mood and affect. Judgement/competence is appropriate  ----------    Patient's abdominal pain is rather nonspecific.  I have a low suspicion for pancreatitis, ovarian etiology, pyelonephritis, pneumonia, PE.  I do suspect gastroenteritis given nausea vomiting diarrhea and abdominal pain.  While she was here patient was offered Reglan which she took with some improvement.  Prior to the Reglan she stated that she was "itching".  She had no hives, lungs were clear.  Patient requested Benadryl.  She is given 25 mg with some improvement.    Patient's blood work shows stable anemia, no leukocytosis.  Normal renal function.  No electro abnormalities.  hCG negative.  Troponin less than 6 (low suspicion for ACS).  Lactate is not elevated.  UA without evidence of a UTI.    Patient refused an EKG and chest x-ray.  Risks of death from disability were discussed with the patient, she was still refusing.  She does have decision-making capacity.  Patient signed out AGAINST MEDICAL ADVICE.  SW consulted, sent back to group home

## 2025-01-07 NOTE — ED ADULT NURSE REASSESSMENT NOTE - NS ED NURSE REASSESS COMMENT FT1
Pt sitting restless on side of stretcher. AxOx4. Pt asked multiple times for increase in dose of Benadryl before initial dose was given. MD Rosas aware. Pt refused Droperidol. Pt refused x-ray. Pt not cooperative with assessment/medication orders.

## 2025-01-07 NOTE — ED ADULT NURSE NOTE - NSFALLUNIVINTERV_ED_ALL_ED
Bed/Stretcher in lowest position, wheels locked, appropriate side rails in place/Call bell, personal items and telephone in reach/Instruct patient to call for assistance before getting out of bed/chair/stretcher/Non-slip footwear applied when patient is off stretcher/[place] to call system/Physically safe environment - no spills, clutter or unnecessary equipment/Purposeful proactive rounding/Room/bathroom lighting operational, light cord in reach

## 2025-01-07 NOTE — ED ADULT NURSE REASSESSMENT NOTE - NS ED NURSE REASSESS COMMENT FT1
Received from break ALEYDA Zeng. Pt sitting on edge of stretcher restless. Pt c/o nausea, dizziness, diarrhea, and episodes of nonbloody emesis x 2 days. Pt states symptoms started yesterday and have not resided. Pt complaining of pruritus all over the body and 10/10, left, lower quadrant pain. Pt states taking 3 tablets of extra strength Tylenol this morning. Pt refuses to get undressed.    Respirations even and unlabored. AxOx4. Ambulatory independently. Lung sounds clear to ausculation. S1 and S2 heard. Rate and rhythm regular. (+) TTP left lower quadrant. Normoactive bowel sounds heard in all four quadrants. Safety maintained. Concerns and questions answered.

## 2025-01-07 NOTE — ED ADULT NURSE REASSESSMENT NOTE - NS ED NURSE REASSESS COMMENT FT1
Report received from day RN. Pt is A&Ox4 and ambulatory without assistance. Pt appears comfortable and in NAD. Pt refused EKG and c/o pain all over her body. MD Aviles made aware. Respirations even and unlabored. Safety maintained with stretcher in lowest position.

## 2025-01-07 NOTE — ED ADULT TRIAGE NOTE - CHIEF COMPLAINT QUOTE
pt brought in by EMS from group home complaining of abdominal pain n/v/d x months. pt denies chest pain, sob, fever or chills.

## 2025-01-07 NOTE — ED PROVIDER NOTE - CLINICAL SUMMARY MEDICAL DECISION MAKING FREE TEXT BOX
[FreeTextEntry2] : back pain workmans comp visit
39 female past medical history hypertension, diabetes, DVT, factor V Leiden, on Eliquis, hypothyroidism, appendectomy, asthma, endometriosis, bipolar, personality disorder, pseudoseizure, intellectual disability, prior substance use disorder, multiple ED visits presenting with abdominal pain, nausea, vomiting, diarrhea.  Patient has had the symptoms intermittently for the past couple of months.  Says that the diarrhea is worse than it has been before.  Denies any blood or melena in the stool.  Had a couple episodes of vomiting and diarrhea yesterday, nothing today.  The abdominal pain is worse on the left side than the left lower quadrant, does not radiate.  Patient has a referral for GI but has not followed up.  Also endorsed an episode of chest pain earlier today, that has resolved.  Sick contacts at home.  No recent travel.  Denies fever, shortness of breath urinary symptoms.  Heart rate 101.  Other vitals nonactionable.  On exam: Patient sitting comfortable in bed in no acute distress.  Heart slightly tacky and regular rhythm.  Lungs clear to auscultation bilaterally abdomen soft with mild tenderness to palpation in the left lower quadrant greater than left upper quadrant.  Mild right CVA tenderness to palpation.  Bilateral nonpitting lower extremity edema.  Differential diagnosis includes but not limited to: Viral gastroenteritis, colitis, UTI, less likely pyelo or ACS.  Plan: Blood work, UA, chest x-ray, EKG, IV fluids, droperidol.  Will reassess.  Likely discharge.

## 2025-01-07 NOTE — ED PROVIDER NOTE - NSFOLLOWUPINSTRUCTIONS_ED_ALL_ED_FT
You have been evaluated in the Emergency Department today for abdominal pain. Your evaluation was not able to be completed because you wanted to leave the ER early.    Your results are attached.    For pain, you can take TYLENOL/ACETAMINOPHEN up to 4,000mg a day for your symptoms in four divided doses.     Please schedule an appointment with your primary care physician and the GI doctor.    Return to the Emergency Department if you experience worsening or uncontrolled pain, fevers 100.4°F or greater, recurrent vomiting, inability to tolerate food or fluids by mouth, bloody stools or vomit, black or tarry stools, or any other concerning symptoms.    Thank you for choosing us for your care.

## 2025-01-07 NOTE — PROVIDER CONTACT NOTE (OTHER) - BACKGROUND
Called [facility], [facility] [place], [phone], spoke with staff member, Jonathon. Called [facility], [facility] [place], [phone], spoke with staff memberJoni.

## 2025-01-07 NOTE — ED PROVIDER NOTE - PHYSICAL EXAMINATION
GENERAL: NAD, activity normal for age, well developed/ well nourished, no cyanosis, pallor, or diaphoresis.  EYES: lids/conjunctiva normal.   EARS/NOSE/THROAT: Mucous membranes moist, nares normal  HEAD/NECK: normocephalic atraumatic, no facial trauma, neck is supple.  RESPIRATORY: respiratory effort normal, speaks in full sentences, no tripod position, no accessory muscle use. Lungs clear to auscultation without rhonchi, wheezes, rales  CARDIAC: Regular rate and rhythm without murmurs, rubs or gallops, no peripheral edema. 2+ radial/PT and DP pulses bilaterally  ABDOMINAL: minimally tender to the LLQ without rebound or guarding. Soft, ND/NT. No evidence of fluid wave. No pulsatile masses on exam, rebound tenderness,  sign or pain over Mcburney's point.  MUSCLES/EXTREMITIES: FROM in all extremities, no joint swelling or tenderness  NEUROLOGICAL: Speech is clear and appropriate. Normal level of consciousness.   PSYCH: Normal mood and affect. Judgement/competence is appropriate

## 2025-01-09 ENCOUNTER — INPATIENT (INPATIENT)
Facility: HOSPITAL | Age: 40
LOS: 5 days | Discharge: HOME | End: 2025-01-15
Attending: HOSPITALIST | Admitting: HOSPITALIST
Payer: MEDICARE

## 2025-01-09 VITALS
RESPIRATION RATE: 19 BRPM | DIASTOLIC BLOOD PRESSURE: 85 MMHG | TEMPERATURE: 98 F | OXYGEN SATURATION: 100 % | HEIGHT: 71 IN | SYSTOLIC BLOOD PRESSURE: 119 MMHG | HEART RATE: 95 BPM

## 2025-01-09 LAB
ADD ON TEST-SPECIMEN IN LAB: SIGNIFICANT CHANGE UP
ALBUMIN SERPL ELPH-MCNC: 4.3 G/DL — SIGNIFICANT CHANGE UP (ref 3.3–5)
ALP SERPL-CCNC: 73 U/L — SIGNIFICANT CHANGE UP (ref 40–120)
ALT FLD-CCNC: 56 U/L — HIGH (ref 4–33)
ANION GAP SERPL CALC-SCNC: 14 MMOL/L — SIGNIFICANT CHANGE UP (ref 7–14)
APAP SERPL-MCNC: <10 UG/ML — LOW (ref 15–25)
APPEARANCE UR: CLEAR — SIGNIFICANT CHANGE UP
AST SERPL-CCNC: 36 U/L — HIGH (ref 4–32)
BASOPHILS # BLD AUTO: 0.02 K/UL — SIGNIFICANT CHANGE UP (ref 0–0.2)
BASOPHILS NFR BLD AUTO: 0.4 % — SIGNIFICANT CHANGE UP (ref 0–2)
BILIRUB SERPL-MCNC: 0.2 MG/DL — SIGNIFICANT CHANGE UP (ref 0.2–1.2)
BILIRUB UR-MCNC: NEGATIVE — SIGNIFICANT CHANGE UP
BLOOD GAS VENOUS COMPREHENSIVE RESULT: SIGNIFICANT CHANGE UP
BUN SERPL-MCNC: 10 MG/DL — SIGNIFICANT CHANGE UP (ref 7–23)
CALCIUM SERPL-MCNC: 9.3 MG/DL — SIGNIFICANT CHANGE UP (ref 8.4–10.5)
CHLORIDE SERPL-SCNC: 102 MMOL/L — SIGNIFICANT CHANGE UP (ref 98–107)
CO2 SERPL-SCNC: 24 MMOL/L — SIGNIFICANT CHANGE UP (ref 22–31)
COLOR SPEC: YELLOW — SIGNIFICANT CHANGE UP
CREAT SERPL-MCNC: 0.76 MG/DL — SIGNIFICANT CHANGE UP (ref 0.5–1.3)
DIFF PNL FLD: NEGATIVE — SIGNIFICANT CHANGE UP
EGFR: 103 ML/MIN/1.73M2 — SIGNIFICANT CHANGE UP
EOSINOPHIL # BLD AUTO: 0.07 K/UL — SIGNIFICANT CHANGE UP (ref 0–0.5)
EOSINOPHIL NFR BLD AUTO: 1.2 % — SIGNIFICANT CHANGE UP (ref 0–6)
ETHANOL SERPL-MCNC: <10 MG/DL — SIGNIFICANT CHANGE UP
FLUAV AG NPH QL: SIGNIFICANT CHANGE UP
FLUBV AG NPH QL: SIGNIFICANT CHANGE UP
GLUCOSE SERPL-MCNC: 80 MG/DL — SIGNIFICANT CHANGE UP (ref 70–99)
GLUCOSE UR QL: NEGATIVE MG/DL — SIGNIFICANT CHANGE UP
HCG UR QL: NEGATIVE — SIGNIFICANT CHANGE UP
HCT VFR BLD CALC: 36.6 % — SIGNIFICANT CHANGE UP (ref 34.5–45)
HGB BLD-MCNC: 11.4 G/DL — LOW (ref 11.5–15.5)
IANC: 2.61 K/UL — SIGNIFICANT CHANGE UP (ref 1.8–7.4)
IMM GRANULOCYTES NFR BLD AUTO: 1.4 % — HIGH (ref 0–0.9)
KETONES UR-MCNC: NEGATIVE MG/DL — SIGNIFICANT CHANGE UP
LEUKOCYTE ESTERASE UR-ACNC: NEGATIVE — SIGNIFICANT CHANGE UP
LYMPHOCYTES # BLD AUTO: 2.21 K/UL — SIGNIFICANT CHANGE UP (ref 1–3.3)
LYMPHOCYTES # BLD AUTO: 39.1 % — SIGNIFICANT CHANGE UP (ref 13–44)
MAGNESIUM SERPL-MCNC: 2 MG/DL — SIGNIFICANT CHANGE UP (ref 1.6–2.6)
MCHC RBC-ENTMCNC: 30 PG — SIGNIFICANT CHANGE UP (ref 27–34)
MCHC RBC-ENTMCNC: 31.1 G/DL — LOW (ref 32–36)
MCV RBC AUTO: 96.3 FL — SIGNIFICANT CHANGE UP (ref 80–100)
MONOCYTES # BLD AUTO: 0.66 K/UL — SIGNIFICANT CHANGE UP (ref 0–0.9)
MONOCYTES NFR BLD AUTO: 11.7 % — SIGNIFICANT CHANGE UP (ref 2–14)
NEUTROPHILS # BLD AUTO: 2.61 K/UL — SIGNIFICANT CHANGE UP (ref 1.8–7.4)
NEUTROPHILS NFR BLD AUTO: 46.2 % — SIGNIFICANT CHANGE UP (ref 43–77)
NITRITE UR-MCNC: NEGATIVE — SIGNIFICANT CHANGE UP
NRBC # BLD: 0 /100 WBCS — SIGNIFICANT CHANGE UP (ref 0–0)
NRBC # FLD: 0 K/UL — SIGNIFICANT CHANGE UP (ref 0–0)
PH UR: 7.5 — SIGNIFICANT CHANGE UP (ref 5–8)
PHOSPHATE SERPL-MCNC: 2.9 MG/DL — SIGNIFICANT CHANGE UP (ref 2.5–4.5)
PLATELET # BLD AUTO: 181 K/UL — SIGNIFICANT CHANGE UP (ref 150–400)
POTASSIUM SERPL-MCNC: 4.4 MMOL/L — SIGNIFICANT CHANGE UP (ref 3.5–5.3)
POTASSIUM SERPL-SCNC: 4.4 MMOL/L — SIGNIFICANT CHANGE UP (ref 3.5–5.3)
PROT SERPL-MCNC: 7.5 G/DL — SIGNIFICANT CHANGE UP (ref 6–8.3)
PROT UR-MCNC: SIGNIFICANT CHANGE UP MG/DL
RBC # BLD: 3.8 M/UL — SIGNIFICANT CHANGE UP (ref 3.8–5.2)
RBC # FLD: 15.8 % — HIGH (ref 10.3–14.5)
RSV RNA NPH QL NAA+NON-PROBE: SIGNIFICANT CHANGE UP
SALICYLATES SERPL-MCNC: <0.3 MG/DL — LOW (ref 15–30)
SARS-COV-2 RNA SPEC QL NAA+PROBE: SIGNIFICANT CHANGE UP
SODIUM SERPL-SCNC: 140 MMOL/L — SIGNIFICANT CHANGE UP (ref 135–145)
SP GR SPEC: 1.07 — HIGH (ref 1–1.03)
TOXICOLOGY SCREEN, DRUGS OF ABUSE, SERUM RESULT: SIGNIFICANT CHANGE UP
TSH SERPL-MCNC: 0.67 UIU/ML — SIGNIFICANT CHANGE UP (ref 0.27–4.2)
UROBILINOGEN FLD QL: 1 MG/DL — SIGNIFICANT CHANGE UP (ref 0.2–1)
WBC # BLD: 5.65 K/UL — SIGNIFICANT CHANGE UP (ref 3.8–10.5)
WBC # FLD AUTO: 5.65 K/UL — SIGNIFICANT CHANGE UP (ref 3.8–10.5)

## 2025-01-09 PROCEDURE — 71045 X-RAY EXAM CHEST 1 VIEW: CPT | Mod: 26

## 2025-01-09 PROCEDURE — 70496 CT ANGIOGRAPHY HEAD: CPT | Mod: 26

## 2025-01-09 PROCEDURE — 70498 CT ANGIOGRAPHY NECK: CPT | Mod: 26

## 2025-01-09 PROCEDURE — 99291 CRITICAL CARE FIRST HOUR: CPT

## 2025-01-09 RX ORDER — SODIUM CHLORIDE 9 MG/ML
1000 INJECTION, SOLUTION INTRAVENOUS
Refills: 0 | Status: DISCONTINUED | OUTPATIENT
Start: 2025-01-09 | End: 2025-01-09

## 2025-01-09 RX ORDER — DEXTROSE MONOHYDRATE 25 G/50ML
25 INJECTION, SOLUTION INTRAVENOUS ONCE
Refills: 0 | Status: COMPLETED | OUTPATIENT
Start: 2025-01-09 | End: 2025-01-09

## 2025-01-09 RX ORDER — ACETAMINOPHEN 80 MG/.8ML
1000 SOLUTION/ DROPS ORAL ONCE
Refills: 0 | Status: COMPLETED | OUTPATIENT
Start: 2025-01-09 | End: 2025-01-09

## 2025-01-09 RX ORDER — METOCLOPRAMIDE 10 MG/1
10 TABLET ORAL ONCE
Refills: 0 | Status: COMPLETED | OUTPATIENT
Start: 2025-01-09 | End: 2025-01-09

## 2025-01-09 RX ADMIN — SODIUM CHLORIDE 110 MILLILITER(S): 9 INJECTION, SOLUTION INTRAVENOUS at 22:07

## 2025-01-09 RX ADMIN — ACETAMINOPHEN 400 MILLIGRAM(S): 80 SOLUTION/ DROPS ORAL at 21:02

## 2025-01-09 RX ADMIN — DEXTROSE MONOHYDRATE 25 MILLILITER(S): 25 INJECTION, SOLUTION INTRAVENOUS at 19:20

## 2025-01-09 RX ADMIN — SODIUM CHLORIDE 100 MILLILITER(S): 9 INJECTION, SOLUTION INTRAVENOUS at 23:34

## 2025-01-09 RX ADMIN — DEXTROSE MONOHYDRATE 25 MILLILITER(S): 25 INJECTION, SOLUTION INTRAVENOUS at 21:58

## 2025-01-09 NOTE — ED ADULT NURSE REASSESSMENT NOTE - NS ED NURSE REASSESS COMMENT FT1
Patient fingerstick low; MD Mitchell made aware. Patient still states "I am itchy everywhere"; patient not actively scratching, and no redness or open wounds noticed. Patient medicated per chart. Patient is in and out of sleep very lethargic; AOx3. VS noted. Will repeat fingerstick. Comfort measures maintained. Bed in lowest position. Safety maintained.

## 2025-01-09 NOTE — ED ADULT TRIAGE NOTE - SPO2 (%)
Patients daughter Emili called in and left a message asking for a call back to review the patients medication list. She wants to get her mother straightened out as far as everything so that she knows exactly what she is supposed to be taking. Emili stated that the patient was just put on spironolactone 50 mg tablets so that was added to our medication list for her. She was taken off of losartan so she is now no longer taking this. They are going to try this for 3 weeks and when they come in for an appointment with Dr. Chapin and have her blood pressure checked they can readdress if the patient should start taking this again. Emili is going to call the other providers office to find out if the patient is supposed to be taking the spironolactone and lasix together or separately at different times. She had no further questions or concerns today.   100

## 2025-01-09 NOTE — ED ADULT NURSE REASSESSMENT NOTE - NS ED NURSE REASSESS COMMENT FT1
Report received from ALEYDA Leger. Patient is alert and in no signs of acute distress. NSR on CM. EKG in chart. Patient medicated per chart. Patient pending CT scan. Comfort measures maintained. Bed in lowest position. Call bell within reach. Safety maintained.

## 2025-01-09 NOTE — ED ADULT TRIAGE NOTE - CHIEF COMPLAINT QUOTE
pt from group home via EMS c/o left sided facial droop, weakness on left arm and left leg since yesterday morning. Unknown LKW. HX Bipolar disease, Schizophrenia. Code stroke not activated because of time of onset symptoms.

## 2025-01-09 NOTE — ED PROVIDER NOTE - PROGRESS NOTE DETAILS
Conner Larry) [facility] PGY-3: Patient blood glucose is now in normal range, continue to have left-sided weakness.  Call neurology for consult.  Per neurology Patient reports history of stroke in 2022 (states was at outside hospital) with residual left-sided weakness, however states that at baseline she is able to move her leg and walk independently. (sept 2024 neuro consult note).  because his left-sided weakness is not new, nothing to do.

## 2025-01-09 NOTE — ED PROVIDER NOTE - WET READ LAUNCH FT
Subjective   Patient ID: Joe Hester is a 75 y.o. male who presents for Follow-up (3 mo /PAD ).  HPI  Patient is overall doing well  He is otherwise active and amatory  Still having hip and knee problems but stable  Much improved secondary to his recent angioplasty  No other specific complaints concerns or questions in the office.    Review of Systems  Review of Systems    Constitutional:  no generalized malaise overall feels well, energy levels intact, no complaints specifically noted  HEENT:  No blurry vision, no visual aides noted, no hearing loss no ear ache no nose bleeds noted, no dysphagia, no congestion otherwise no pertinent positives noted  Cardiovascular:  no palpitations, chest pain or heaviness noted, no leg swelling, no numbness or tingling in the lower extremity noted  Respiratory:  no shortness of breath, no productive cough noted, no conversation dyspnea or difficulty breathing  Gastrointestinal:  no abdominal pain, no nausea or vomiting, appetite intact, no bowel irregularities noted  Genitourinary:   no urinary incontinence, frequency or urgency issues noted, no hematuria or burning sensation issues  Musculoskeletal:  No muscle aches or pains, no joint discomfort noted, no back pain noted otherwise feels well  Skin: no ulcerations, skin color issues or wounds upper or lower extremities  Neurologic: no dizziness, no hemiplegia, no hemiparesis, no obvious visual deficits noted  Psychiatric: no depression, no memory loss noted, no suicidal ideation  Endocrine: no weight loss or gain, no temperature concerns hot or cold intolerance  Hemogolotic/Lymphatic: no bruising, excessive bleeding, no swelling in the groins or neck noted    Objective   Physical Exam  Physical exam    Constitutional: alert and in no acute distress verbal  Eyes: No erythema swelling or discharge noted  Neck: supple, symmetric, trachea midline, no masses noted  Cardiovascular: Carotid pulses 2+, no obvious bruit, no Jugular  distension noted, no thrill, heart regular rate, lower extremity vascular exam intact, cap refill <2 sec  Pulmonary:  Bilateral breath sounds intact, clear with rales rhonchi or wheeze  Abdomen: soft non tender, no pulsatile masses noted, no rebound rigidity or guarding noted  Skin: intact warm no abnormal turgor  Psychiatric: alert without any obvious cognitive issues, oriented to person, place, and time    Assessment/Plan   Peripheral arterial disease  Improved KVNG in velocities based on recent arterial duplex done December 16, 2024  Continue activity and ambulation  Follow-up in 3 months repeat imaging at that time.           Marvin Marte DO 01/09/25 10:43 AM    There are no Wet Read(s) to document. There is 1 Wet Read(s) to document.

## 2025-01-09 NOTE — ED ADULT NURSE NOTE - OBJECTIVE STATEMENT
An RN: Pt brought to room 22 , hypoglycemic with FS. Pt not speaking but fluttering/opening eyes to verbal and tactile stimuli. Pt changed and placed on cardiac monitor. NSR noted on monitor. O2 saturation >95% on RA. Respirations even and unlabored, chest rise equal b/l. VS as noted in flow sheets. 20g IVL placed in RAC. Labs collected and sent. Medication administered as ordered, see MAR. Dr. Chow at bedside for eval. No acute distress noted. Safety maintained throughout. An RN: Pt brought to room 22 , hypoglycemic with FS 46 and 49 upon arrival. PHx of HTN, DM, seizures, bipolar. Pt not speaking but fluttering/opening eyes to verbal and tactile stimuli. Pt changed and placed on cardiac monitor. NSR noted on monitor. O2 saturation >95% on RA. Respirations even and unlabored, chest rise equal b/l. VS as noted in flow sheets. 20g IVL placed in RAC. Labs collected and sent. Medication administered as ordered, see MAR. Dr. Chow at bedside for eval. No acute distress noted. Safety maintained throughout.

## 2025-01-09 NOTE — ED PROVIDER NOTE - ATTENDING CONTRIBUTION TO CARE
38 yo F hx factor V leiden deficiency c/b DVT/PE (on Eliquis BID), BPD, hypothyroidism, PNES, IBS, GERD, PID, endometriosis, asthma, personality disorder, CVA with residual L sided weakness, sent in from group home with complaints of L sided weakness/facial droop since yesterday. LKW unknown. Pt initially lethargic, noted to have blood glucose of 46. Treated with D50 and with repeat episodes of hypoglycemia. Pt started on D5 drip as patient was still not alert enough to eat/drink and symptoms improved but still with dropping glucose. Pt passed PO trial and given food. [facility] [place] ordered for neuro sx. Pt with c/o weakness b/l LE and to LUE. Denies exogenous insulin use or sulfonylureas. No noted facial droop noted. Plan for labs, meds, fluids, admit for hypoglycemia.    VITALS: reviewed  GEN: NAD, A & O x 4  HEAD/EYES: NCAT, EOMI, anicteric sclerae,   ENT: mucus membranes moist, oropharynx WNL, trachea midline,  RESP: lungs CTA with equal breath sounds bilaterally, chest wall nontender and atraumatic  CV: heart with reg rhythm S1, S2, distal pulses intact and symmetric bilaterally  ABDOMEN: soft, nondistended, nontender, no palpable masses  : no CVAT  MSK: extremities atraumatic and nontender, no edema, no asymmetry.  SKIN: warm, dry, no rash, no bruising, no cyanosis. color appropriate for ethnicity  NEURO: lethargic, no facial asymmetry. LUE weakness, b/l LE weakness.   PSYCH: Affect appropriate

## 2025-01-09 NOTE — ED PROVIDER NOTE - OBJECTIVE STATEMENT
39 year-old right-handed woman with a PMH of obesity, factor V Leiden deficiency a/w DVT/PE (on Eliquis 5mg BID), bipolar disorder, hypothyroidism, PNES, IBS, GERD, PID, endometriosis, asthma, personality disorder, and mild intellectual disability who presented to [facility] ED from longterm for left sided weakness starting at 1/8/2025 in the AM.   Patient was also hypoglycemic in triage.  Patient report of frontal headache, some cough. Patient declining drug use, declined taking other people medication. However history limited as patient is not really participating in interview.

## 2025-01-09 NOTE — ED ADULT NURSE REASSESSMENT NOTE - NS ED NURSE REASSESS COMMENT FT1
An RN: Pt now A&Ox3, lethargic but speaking and answering questions. Pt endorses headache and left sided numbness and weakness at this time. Pt states she cannot move left upper extremity. Pt states symptoms started yesterday morning, LKN unknown. Pt states last meal was also yesterday morning. Dr. Larry and Dr. Chow at bedside for eval. No code stroke called as per Dr. Chow. NSR noted on monitor. Respirations even/unlabored, chest rise equal b/l. Safety maintained throughout. Report given to primary RN Arsen. Patient discharging home today.  Appointments have been scheduled for her.  Will update son with appointment information.      [facility]   Dr Vazquez   Wednesday, May 20, 2020   11:00 AM     [address] [address], [place] [address]   Phone: [phone]     With COVID-19 restrictions, this appointment has been set up as a phone call with Dr Vazquez. Please have your phone available I hour prior to the appointment time. The Nurse will be calling you first and then shortly after, Dr Vazquez will be calling you. If you have any questions, please feel free to call the clinic.             Telephone Visit with Azucena Almanzar MD   [facility]   Monday, Apr 27, 2020   11:00 AM   Please have a list of all current medications available for appointment.       [address] [place] [id number] [phone]           With COVID-19 restrictions, Your Appointment with Dr Almanzar has been set up as a phone call with Dr Almanzar. Please have your phone available I hour prior to the appointment time. The Nurse will be calling you first and then shortly after, Dr Almanzar will be calling you. If you have any questions, please feel free to call the clinic.

## 2025-01-09 NOTE — ED PROVIDER NOTE - PHYSICAL EXAMINATION
GENERAL: Alert. No acute distress.   EYES: EOMI grossly normal. Anicteric.   HENT: Moist mucous membranes.   RESP: No conversation dyspnea, no resp distress, CTAB   CARDIOVASCULAR: RRR, no m/g/r  ABDOMEN: soft, non distended, nttp  MSK: ROM grossly normal in all 4 extremities. No deformities  SKIN: warm and dry  NEUROLOGIC: Alert and oriented x3,   Limited neuroexam, patient would not participate.  Lift left hand, 1 drop, patient protect face.  PSYCHIATRIC: Cooperative. Appropriate mood and affect

## 2025-01-09 NOTE — ED PROVIDER NOTE - CLINICAL SUMMARY MEDICAL DECISION MAKING FREE TEXT BOX
39 year-old right-handed woman with a PMH of obesity, factor V Leiden deficiency a/w DVT/PE (on Eliquis 5mg BID), bipolar disorder, hypothyroidism, PNES, IBS, GERD, PID, endometriosis, asthma, personality disorder, and mild intellectual disability who presented to [facility] ED from [facility] for left sided weakness starting at 1/8/2025 in the AM.   As this is outside of code stroke window, will not call code stroke.  Will do head scan, baseline lab.  Will give D50 for hypoglycemia, if continued to be hypoglycemic, will start on D5 drip.  if patient continued to be hypotensive, will require admission for workup of hypoglycemia.

## 2025-01-09 NOTE — ED ADULT NURSE NOTE - NSFALLRISKINTERV_ED_ALL_ED

## 2025-01-10 ENCOUNTER — NON-APPOINTMENT (OUTPATIENT)
Age: 40
End: 2025-01-10

## 2025-01-10 DIAGNOSIS — R53.1 WEAKNESS: ICD-10-CM

## 2025-01-10 DIAGNOSIS — E03.9 HYPOTHYROIDISM, UNSPECIFIED: ICD-10-CM

## 2025-01-10 DIAGNOSIS — Z29.9 ENCOUNTER FOR PROPHYLACTIC MEASURES, UNSPECIFIED: ICD-10-CM

## 2025-01-10 DIAGNOSIS — E16.2 HYPOGLYCEMIA, UNSPECIFIED: ICD-10-CM

## 2025-01-10 DIAGNOSIS — D68.51 ACTIVATED PROTEIN C RESISTANCE: ICD-10-CM

## 2025-01-10 LAB
C PEPTIDE SERPL-MCNC: 4.9 NG/ML — HIGH (ref 1.1–4.4)
GLUCOSE BLDC GLUCOMTR-MCNC: 111 MG/DL — HIGH (ref 70–99)
GLUCOSE BLDC GLUCOMTR-MCNC: 122 MG/DL — HIGH (ref 70–99)
GLUCOSE BLDC GLUCOMTR-MCNC: 99 MG/DL — SIGNIFICANT CHANGE UP (ref 70–99)

## 2025-01-10 PROCEDURE — 99223 1ST HOSP IP/OBS HIGH 75: CPT

## 2025-01-10 RX ORDER — DEXTROSE MONOHYDRATE 25 G/50ML
25 INJECTION, SOLUTION INTRAVENOUS ONCE
Refills: 0 | Status: DISCONTINUED | OUTPATIENT
Start: 2025-01-10 | End: 2025-01-15

## 2025-01-10 RX ORDER — SODIUM CHLORIDE 9 MG/ML
1000 INJECTION, SOLUTION INTRAVENOUS
Refills: 0 | Status: DISCONTINUED | OUTPATIENT
Start: 2025-01-10 | End: 2025-01-13

## 2025-01-10 RX ORDER — CLONIDINE HYDROCHLORIDE 0.2 MG/1
0.1 TABLET ORAL AT BEDTIME
Refills: 0 | Status: DISCONTINUED | OUTPATIENT
Start: 2025-01-10 | End: 2025-01-15

## 2025-01-10 RX ORDER — MAG HYDROX/ALUMINUM HYD/SIMETH 200-200-20
30 SUSPENSION, ORAL (FINAL DOSE FORM) ORAL EVERY 4 HOURS
Refills: 0 | Status: DISCONTINUED | OUTPATIENT
Start: 2025-01-10 | End: 2025-01-15

## 2025-01-10 RX ORDER — DIPHENHYDRAMINE HCL 25 MG
50 TABLET ORAL AT BEDTIME
Refills: 0 | Status: DISCONTINUED | OUTPATIENT
Start: 2025-01-10 | End: 2025-01-15

## 2025-01-10 RX ORDER — LEVOTHYROXINE SODIUM 175 UG/1
100 TABLET ORAL DAILY
Refills: 0 | Status: DISCONTINUED | OUTPATIENT
Start: 2025-01-10 | End: 2025-01-15

## 2025-01-10 RX ORDER — QUETIAPINE FUMARATE 100 MG/1
225 TABLET, FILM COATED ORAL AT BEDTIME
Refills: 0 | Status: DISCONTINUED | OUTPATIENT
Start: 2025-01-10 | End: 2025-01-15

## 2025-01-10 RX ORDER — GINKGO BILOBA 40 MG
3 CAPSULE ORAL AT BEDTIME
Refills: 0 | Status: DISCONTINUED | OUTPATIENT
Start: 2025-01-10 | End: 2025-01-15

## 2025-01-10 RX ORDER — ROSUVASTATIN 40 MG/1
5 TABLET, FILM COATED ORAL AT BEDTIME
Refills: 0 | Status: DISCONTINUED | OUTPATIENT
Start: 2025-01-10 | End: 2025-01-15

## 2025-01-10 RX ORDER — DEXTROSE MONOHYDRATE 25 G/50ML
12.5 INJECTION, SOLUTION INTRAVENOUS ONCE
Refills: 0 | Status: DISCONTINUED | OUTPATIENT
Start: 2025-01-10 | End: 2025-01-15

## 2025-01-10 RX ORDER — NALTREXONE HYDROCHLORIDE 50 MG/1
50 TABLET, FILM COATED ORAL DAILY
Refills: 0 | Status: DISCONTINUED | OUTPATIENT
Start: 2025-01-10 | End: 2025-01-15

## 2025-01-10 RX ORDER — SUCRALFATE 1 G/10ML
1 SUSPENSION ORAL
Refills: 0 | Status: DISCONTINUED | OUTPATIENT
Start: 2025-01-10 | End: 2025-01-15

## 2025-01-10 RX ORDER — SODIUM CHLORIDE 9 MG/ML
1000 INJECTION, SOLUTION INTRAVENOUS
Refills: 0 | Status: DISCONTINUED | OUTPATIENT
Start: 2025-01-10 | End: 2025-01-10

## 2025-01-10 RX ORDER — ACETAMINOPHEN 80 MG/.8ML
650 SOLUTION/ DROPS ORAL EVERY 6 HOURS
Refills: 0 | Status: DISCONTINUED | OUTPATIENT
Start: 2025-01-10 | End: 2025-01-15

## 2025-01-10 RX ORDER — DEXTROSE MONOHYDRATE 25 G/50ML
15 INJECTION, SOLUTION INTRAVENOUS ONCE
Refills: 0 | Status: DISCONTINUED | OUTPATIENT
Start: 2025-01-10 | End: 2025-01-15

## 2025-01-10 RX ORDER — GLUCAGON INJECTION, SOLUTION 0.5 MG/.1ML
1 INJECTION, SOLUTION SUBCUTANEOUS ONCE
Refills: 0 | Status: DISCONTINUED | OUTPATIENT
Start: 2025-01-10 | End: 2025-01-15

## 2025-01-10 RX ORDER — CYCLOBENZAPRINE HCL 10 MG
5 TABLET ORAL THREE TIMES A DAY
Refills: 0 | Status: DISCONTINUED | OUTPATIENT
Start: 2025-01-10 | End: 2025-01-15

## 2025-01-10 RX ORDER — DIVALPROEX SODIUM 500 MG/1
750 TABLET, DELAYED RELEASE ORAL
Refills: 0 | Status: DISCONTINUED | OUTPATIENT
Start: 2025-01-10 | End: 2025-01-15

## 2025-01-10 RX ORDER — APIXABAN 5 MG/1
5 TABLET, FILM COATED ORAL
Refills: 0 | Status: DISCONTINUED | OUTPATIENT
Start: 2025-01-10 | End: 2025-01-15

## 2025-01-10 RX ORDER — IPRATROPIUM BROMIDE AND ALBUTEROL SULFATE .5; 2.5 MG/3ML; MG/3ML
3 SOLUTION RESPIRATORY (INHALATION) EVERY 6 HOURS
Refills: 0 | Status: DISCONTINUED | OUTPATIENT
Start: 2025-01-10 | End: 2025-01-15

## 2025-01-10 RX ORDER — ESCITALOPRAM OXALATE 10 MG/1
10 TABLET ORAL DAILY
Refills: 0 | Status: DISCONTINUED | OUTPATIENT
Start: 2025-01-10 | End: 2025-01-15

## 2025-01-10 RX ORDER — RISPERIDONE 0.5 MG/1
1 TABLET ORAL AT BEDTIME
Refills: 0 | Status: DISCONTINUED | OUTPATIENT
Start: 2025-01-10 | End: 2025-01-15

## 2025-01-10 RX ORDER — DIPHENHYDRAMINE HCL 25 MG
50 TABLET ORAL EVERY 6 HOURS
Refills: 0 | Status: DISCONTINUED | OUTPATIENT
Start: 2025-01-10 | End: 2025-01-15

## 2025-01-10 RX ADMIN — QUETIAPINE FUMARATE 225 MILLIGRAM(S): 100 TABLET, FILM COATED ORAL at 22:18

## 2025-01-10 RX ADMIN — SUCRALFATE 1 GRAM(S): 1 SUSPENSION ORAL at 17:30

## 2025-01-10 RX ADMIN — Medication 50 MILLIGRAM(S): at 22:19

## 2025-01-10 RX ADMIN — DIVALPROEX SODIUM 750 MILLIGRAM(S): 500 TABLET, DELAYED RELEASE ORAL at 17:30

## 2025-01-10 RX ADMIN — ESCITALOPRAM OXALATE 10 MILLIGRAM(S): 10 TABLET ORAL at 12:14

## 2025-01-10 RX ADMIN — ROSUVASTATIN 5 MILLIGRAM(S): 40 TABLET, FILM COATED ORAL at 22:19

## 2025-01-10 RX ADMIN — CLONIDINE HYDROCHLORIDE 0.1 MILLIGRAM(S): 0.2 TABLET ORAL at 22:19

## 2025-01-10 RX ADMIN — Medication 50 MILLIGRAM(S): at 17:31

## 2025-01-10 RX ADMIN — Medication 50 MILLIGRAM(S): at 11:22

## 2025-01-10 RX ADMIN — RISPERIDONE 1 MILLIGRAM(S): 0.5 TABLET ORAL at 22:19

## 2025-01-10 RX ADMIN — Medication 3 MILLIGRAM(S): at 22:19

## 2025-01-10 RX ADMIN — NALTREXONE HYDROCHLORIDE 50 MILLIGRAM(S): 50 TABLET, FILM COATED ORAL at 11:29

## 2025-01-10 RX ADMIN — SODIUM CHLORIDE 100 MILLILITER(S): 9 INJECTION, SOLUTION INTRAVENOUS at 01:13

## 2025-01-10 RX ADMIN — APIXABAN 5 MILLIGRAM(S): 5 TABLET, FILM COATED ORAL at 17:30

## 2025-01-10 NOTE — H&P ADULT - HISTORY OF PRESENT ILLNESS
39 year-old right-handed woman with a PMH of obesity, factor V Leiden deficiency a/w DVT/PE (on Eliquis 5mg BID), bipolar disorder, hypothyroidism, PNES, IBS, GERD, PID, endometriosis, asthma, personality disorder,  mild intellectual disability, CVA w. residual L sided weakness  presenting from group home for left sided weakness, found to have hypoglycemia. Pt states she weakness began 1/8/2025 in the morning. Endorses residual L sided weakness from previous stroke but states this felt "worse." Describes not being able to move L foot which she was able to do previously. Also w/ decreased movement of L upper ext. States prior to worsening of L sided weakness, she had headaches (which pt states is chronic), also had dizziness, N/V and diarrhea. N/V and diarrhea now resolved, got peptobismol at group home. States dizziness also resolved. Still w/ some headaches. No photophobia/phonophobia.   ROS otherwise positive for chills , no fevers. Also reports "delusions." Describes delusions as a decrease in memory and being  more forgetful recently. Denies auditory or visual hallucinations. Denies SI/HI. ROS Otherwise negative. Denies fevers, chest pain, palpitations, SOB, abdominal pain, constipation, dysuria. No seizure activity. Regarding hypoglycemia, pt states likely new. However group home does not check her FS so pt unsure if she's been hypoglycemic in the past.

## 2025-01-10 NOTE — PATIENT PROFILE ADULT - ARE SIGNIFICANT INDICATORS COMPLETE.
- Discussed options of HealthyCORE-Intensive Lifestyle Intervention Program, Very Low Calorie Diet-VLCD and Conservative Program and the role of weight loss medications  - Explained the importance of making lifestyle changes first before starting any anti-obesity medications  - Patient should demonstrate lifestyle changes first before anti-obesity medication can be initiated  - Already on Topamax for history of epilepsy   - Not a candidate for Wellbutrin or Contrave due to seizure history  - Avoid phentermine given history of seizures  - Patient is interested in pursuing HealthyCORE-Intensive Lifestyle Intervention Program  - Initial weight loss goal of 5-10% weight loss for improved health  - Weight loss can improve patient's co-morbid conditions and/or prevent weight-related complications  - Stop Bang 2/8  - Labs reviewed: CMP and TSH 6/21/2022  Alk phos slightly elevated (chronic), otherwise labs within acceptable range  - Check lipid panel, A1C, and fasting insulin 
[Negative] : Skin
No

## 2025-01-10 NOTE — ED ADULT NURSE REASSESSMENT NOTE - NS ED NURSE REASSESS COMMENT FT1
break coverage rn. rpt FS performed noted to be 101, MD watts made aware. pt A&Ox4, but lethargic. satting 95% on room air NSR on cardiac monitor. receiving IVF with dextrose at rate of 100cc/hr. pending further orders. respirations even unlabored, abdomen soft, pedal pulses 2+bilaterally.

## 2025-01-10 NOTE — H&P ADULT - PROBLEM SELECTOR PLAN 2
--CT Head / CTA head/neck without acute findings  --Neuro consulted per ETyraD; NTD per neuro as L sided weakness unchanged from prior  --Close monitoring  --PT Eval

## 2025-01-10 NOTE — ED ADULT NURSE REASSESSMENT NOTE - NS ED NURSE REASSESS COMMENT FT1
Patient is alert and in no signs of acute distress. NSR on CM. Respirations even and unlabored, chest rise symmetrical b/l. Patient medicated per chart. Comfort measures maintained. Bed in lowest position. Safety maintained. Patient is alert and in no signs of acute distress. NSR on CM. Respirations even and unlabored, chest rise symmetrical b/l. Comfort measures maintained. Bed in lowest position. Safety maintained.

## 2025-01-10 NOTE — H&P ADULT - PROBLEM SELECTOR PLAN 1
--Unclear etiology  --Started on D5/ 1/2 NS.   --Hold IVF and encourage PO intake  --If FS < 55, obtain STAT BMP, Insulin level, Insulin Ab, Pro-insulin, C-Peptide, BHB, Sulfonylurea screen  --Obtain AM cortisol  --Obtain A1C  --Endo consult pending above work up --Unclear etiology  --Started on D5/ 1/2 NS.   --Hold IVF and encourage PO intake  --If FS < 55, obtain STAT BMP, Insulin level, Insulin Ab, Pro-insulin, C-Peptide, BHB, Sulfonylurea screen  --If symptomatic, would treat. Would not hold off on treatment to obtain Labs  --Can resume D5/NS after hypoglycemic work up is obtained   --Obtain AM cortisol  --Obtain A1C  --Endo consult pending above work up

## 2025-01-10 NOTE — H&P ADULT - NSHPREVIEWOFSYSTEMS_GEN_ALL_CORE
CONSTITUTIONAL:  No weight loss, fever, chills, weakness or fatigue.  HEENT:    No visual loss, blurred vision, No hearing loss, sneezing, congestion, runny nose or sore throat.  SKIN:  No rash or itching.  CARDIOVASCULAR:  No chest pain or chest discomfort. No palpitations.  RESPIRATORY:  No shortness of breath, cough or sputum.  GASTROINTESTINAL:  No nausea, vomiting or diarrhea. No abdominal pain or blood.  GENITOURINARY:  Denies hematuria, dysuria.   NEUROLOGICAL:  + headache, No dizziness, numbness or tingling in the extremities. No change in bowel or bladder control.  MUSCULOSKELETAL:  No muscle, back pain, joint pain or stiffness.  HEMATOLOGIC:  No  bleeding or bruising.  ENDOCRINOLOGIC:  No reports of sweating, cold or heat intolerance. No polyuria or polydipsia.

## 2025-01-10 NOTE — H&P ADULT - TIME BILLING
reviewing laboratory data, consultants' recommendations, documentation in [facility], performing medically appropriate examinations/evaluations, discussion with patient/family/RN/ACP/Residents and interdisciplinary staff (such as , social workers, etc), counseling patient/family/care giver, ordering medically appropriate medication, tests, or procedures

## 2025-01-10 NOTE — H&P ADULT - NSHPPHYSICALEXAM_GEN_ALL_CORE
GENERAL: NAD  HEAD:  Atraumatic, Normocephalic  EYES: EOMI, conjunctiva and sclera clear  NECK: Supple  CHEST/LUNG: Clear to auscultation bilaterally; No wheeze, rhonchi, crackles or rales  HEART: Regular rate and rhythm; No murmurs, rubs, or gallops  ABDOMEN: Soft, Nontender, Nondistended; Bowel sounds present  EXTREMITIES:  2+ Peripheral Pulses, No edema  PSYCH: AAOx2 (states its 2024)  NEUROLOGY: 4/5 strength R upper and Lower ext. 0/5 LL Ext, 2/5 LUE. Sensation in tact. Poor pt effort/participation also noted.   SKIN: Warm and dry

## 2025-01-10 NOTE — H&P ADULT - ASSESSMENT
39 year-old right-handed woman with a PMH of obesity, factor V Leiden deficiency a/w DVT/PE (on Eliquis 5mg BID), bipolar disorder, hypothyroidism, PNES, IBS, GERD, PID, endometriosis, asthma, personality disorder,  mild intellectual disability, CVA w. residual L sided weakness  presenting from group home for left sided weakness, found to have hypoglycemia

## 2025-01-10 NOTE — ED ADULT NURSE REASSESSMENT NOTE - NS ED NURSE REASSESS COMMENT FT1
Patient states "my left leg feels numb"; MD Larry at bedside. Patient still unable to get left arm up; states "its numb". Patient ambulates with an unsteady gait due to her left leg numbness; MD Larry made aware.

## 2025-01-10 NOTE — H&P ADULT - NSHPLABSRESULTS_GEN_ALL_CORE
11.4   5.65  )-----------( 181      ( 2025 19:37 )             36.6           140  |  102  |  10  ----------------------------<  80  4.4   |  24  |  0.76    Ca    9.3      2025 19:37  Phos  2.9       Mg     2.00         TPro  7.5  /  Alb  4.3  /  TBili  0.2  /  DBili  x   /  AST  36[H]  /  ALT  56[H]  /  AlkPhos  73                Urinalysis Basic - ( 2025 23:43 )    Color: Yellow / Appearance: Clear / S.075 / pH: x  Gluc: x / Ketone: Negative mg/dL  / Bili: Negative / Urobili: 1.0 mg/dL   Blood: x / Protein: Trace mg/dL / Nitrite: Negative   Leuk Esterase: Negative / RBC: x / WBC x   Sq Epi: x / Non Sq Epi: x / Bacteria: x                  CAPILLARY BLOOD GLUCOSE      POCT Blood Glucose.: 111 mg/dL (10 James 2025 07:15)              RADIOLOGY & ADDITIONAL TESTS:    Imaging personally reviewed.    Consultant(s) notes reviewed.    Care discussed with consultants and other providers.

## 2025-01-11 DIAGNOSIS — M79.89 OTHER SPECIFIED SOFT TISSUE DISORDERS: ICD-10-CM

## 2025-01-11 LAB
A1C WITH ESTIMATED AVERAGE GLUCOSE RESULT: 5.4 % — SIGNIFICANT CHANGE UP (ref 4–5.6)
ANION GAP SERPL CALC-SCNC: 11 MMOL/L — SIGNIFICANT CHANGE UP (ref 7–14)
BUN SERPL-MCNC: 12 MG/DL — SIGNIFICANT CHANGE UP (ref 7–23)
CALCIUM SERPL-MCNC: 8.9 MG/DL — SIGNIFICANT CHANGE UP (ref 8.4–10.5)
CHLORIDE SERPL-SCNC: 103 MMOL/L — SIGNIFICANT CHANGE UP (ref 98–107)
CO2 SERPL-SCNC: 24 MMOL/L — SIGNIFICANT CHANGE UP (ref 22–31)
CREAT SERPL-MCNC: 0.75 MG/DL — SIGNIFICANT CHANGE UP (ref 0.5–1.3)
EGFR: 105 ML/MIN/1.73M2 — SIGNIFICANT CHANGE UP
ESTIMATED AVERAGE GLUCOSE: 108 — SIGNIFICANT CHANGE UP
GLUCOSE BLDC GLUCOMTR-MCNC: 100 MG/DL — HIGH (ref 70–99)
GLUCOSE BLDC GLUCOMTR-MCNC: 105 MG/DL — HIGH (ref 70–99)
GLUCOSE BLDC GLUCOMTR-MCNC: 109 MG/DL — HIGH (ref 70–99)
GLUCOSE BLDC GLUCOMTR-MCNC: 89 MG/DL — SIGNIFICANT CHANGE UP (ref 70–99)
GLUCOSE BLDC GLUCOMTR-MCNC: 93 MG/DL — SIGNIFICANT CHANGE UP (ref 70–99)
GLUCOSE SERPL-MCNC: 88 MG/DL — SIGNIFICANT CHANGE UP (ref 70–99)
HCT VFR BLD CALC: 32.2 % — LOW (ref 34.5–45)
HGB BLD-MCNC: 10.1 G/DL — LOW (ref 11.5–15.5)
MCHC RBC-ENTMCNC: 30.1 PG — SIGNIFICANT CHANGE UP (ref 27–34)
MCHC RBC-ENTMCNC: 31.4 G/DL — LOW (ref 32–36)
MCV RBC AUTO: 96.1 FL — SIGNIFICANT CHANGE UP (ref 80–100)
NRBC # BLD: 0 /100 WBCS — SIGNIFICANT CHANGE UP (ref 0–0)
NRBC # FLD: 0 K/UL — SIGNIFICANT CHANGE UP (ref 0–0)
PLATELET # BLD AUTO: 157 K/UL — SIGNIFICANT CHANGE UP (ref 150–400)
POTASSIUM SERPL-MCNC: 4.5 MMOL/L — SIGNIFICANT CHANGE UP (ref 3.5–5.3)
POTASSIUM SERPL-SCNC: 4.5 MMOL/L — SIGNIFICANT CHANGE UP (ref 3.5–5.3)
RBC # BLD: 3.35 M/UL — LOW (ref 3.8–5.2)
RBC # FLD: 15.8 % — HIGH (ref 10.3–14.5)
SODIUM SERPL-SCNC: 138 MMOL/L — SIGNIFICANT CHANGE UP (ref 135–145)
WBC # BLD: 4.95 K/UL — SIGNIFICANT CHANGE UP (ref 3.8–10.5)
WBC # FLD AUTO: 4.95 K/UL — SIGNIFICANT CHANGE UP (ref 3.8–10.5)

## 2025-01-11 PROCEDURE — 99232 SBSQ HOSP IP/OBS MODERATE 35: CPT

## 2025-01-11 RX ADMIN — ROSUVASTATIN 5 MILLIGRAM(S): 40 TABLET, FILM COATED ORAL at 23:59

## 2025-01-11 RX ADMIN — LEVOTHYROXINE SODIUM 100 MICROGRAM(S): 175 TABLET ORAL at 06:17

## 2025-01-11 RX ADMIN — Medication 50 MILLIGRAM(S): at 15:26

## 2025-01-11 RX ADMIN — SUCRALFATE 1 GRAM(S): 1 SUSPENSION ORAL at 17:09

## 2025-01-11 RX ADMIN — ESCITALOPRAM OXALATE 10 MILLIGRAM(S): 10 TABLET ORAL at 11:47

## 2025-01-11 RX ADMIN — DIVALPROEX SODIUM 750 MILLIGRAM(S): 500 TABLET, DELAYED RELEASE ORAL at 17:09

## 2025-01-11 RX ADMIN — CLONIDINE HYDROCHLORIDE 0.1 MILLIGRAM(S): 0.2 TABLET ORAL at 23:58

## 2025-01-11 RX ADMIN — NALTREXONE HYDROCHLORIDE 50 MILLIGRAM(S): 50 TABLET, FILM COATED ORAL at 11:47

## 2025-01-11 RX ADMIN — SUCRALFATE 1 GRAM(S): 1 SUSPENSION ORAL at 06:17

## 2025-01-11 RX ADMIN — APIXABAN 5 MILLIGRAM(S): 5 TABLET, FILM COATED ORAL at 17:09

## 2025-01-11 RX ADMIN — DIVALPROEX SODIUM 750 MILLIGRAM(S): 500 TABLET, DELAYED RELEASE ORAL at 06:16

## 2025-01-11 RX ADMIN — RISPERIDONE 1 MILLIGRAM(S): 0.5 TABLET ORAL at 23:59

## 2025-01-11 RX ADMIN — QUETIAPINE FUMARATE 225 MILLIGRAM(S): 100 TABLET, FILM COATED ORAL at 23:58

## 2025-01-11 RX ADMIN — APIXABAN 5 MILLIGRAM(S): 5 TABLET, FILM COATED ORAL at 06:17

## 2025-01-11 RX ADMIN — Medication 3 MILLIGRAM(S): at 23:59

## 2025-01-11 RX ADMIN — Medication 50 MILLIGRAM(S): at 07:56

## 2025-01-11 RX ADMIN — Medication 50 MILLIGRAM(S): at 23:57

## 2025-01-11 NOTE — PHYSICAL THERAPY INITIAL EVALUATION ADULT - ADDITIONAL COMMENTS
As per EMR, pt came from a group home. Prior to admission, pt states she was ambulating independently.   Post PT evaluation, pt left with head of bed elevated to 30 degrees, alarm on, call bell and remote within reach, all precautions maintained, NAD. RN aware.

## 2025-01-11 NOTE — PHYSICAL THERAPY INITIAL EVALUATION ADULT - BED MOBILITY LIMITATIONS, REHAB EVAL
[FreeTextEntry1] : The patient was referred by Dr. Guilherme Mo ([phone]) because of an abnormal EKG (LBBB).\par She is following up now for management of both Hypertension and Hyperlipidemia\par \par Scenario: The patient had not seen a physician in many years but recently felt a lump in her left breast. She saw Dr. Mo and he confirmed the presence of a mass and ordered a mammogram. He also performed an EKG and informed her that it was abnormal and she was told that she might have had a heart attack at some point. She was referred here.\par \par The patient has no prior cardiac history. Specifically no history of heart attack or chest pains that she can recall. No significant shortness of breath or dyspnea on exertion. No history of heart murmur or rheumatic fever. No history of dizziness, lightheadedness, syncope, or palpitations. No recent fevers or chills. Her weight is stable.\par --Risk factors for CAD include the  following: Smoking (originally a pack and a half per day but now down to less than one pack) and positive family history (father  of an MI in his 60s).\par --There is no history of hypertension, diabetes, or hyperlipidemia although it is again noted that she had not seen a physician for many years.\par \par EKG from Dr. Webb office from 2019 reveals sinus rhythm with normal axis increased voltage, poor R-wave progression, and a rate related left bundle branch block versus PVCs.\par \par An EKG performed  in this office reveals sinus rhythm without classic left bundle branch block.\par ===============================================================================\par She was referred for Lexiscan stress testing, due to LBBB:\par Lexiscan THALLIUM (Myoview not available, apparently): May 9, 2019\par Her were no symptoms and there were no diagnostic EKG changes.\par SPECT imaging revealed a small zone of moderately diminished tracer uptake in the anteroseptal segment. There may be mild improvement on rest imaging.\par Normal left size, wall motion, and ejection fraction of 79%.\par Also there is a mainly fixed mild anterior septal defect\par \par NB: Labs from Dr. Mo on 19 revealed an elevated Chol of 270, with LDL of 170 !!\par and HDL of 65, trig 140. He started her on Simvastatin 20 mg\par \par Carotid u/s ( 2019:  \par 1) Normal Left ICA\par 2) 6069% ELIAS stenosis\par ===================================================================================\par Currently, she remains under treatment for:\par 1)  hypertension started using losartan--HCTZ in .  \par 2) hyperlipidemia, on simvastatin.\par She continues to feel well. She is quite asymptomatic. Specifically, no cardiovascular symptoms such as chest discomfort, shortness of breath, dyspnea on exertion, orthopnea, or PND. She is taking and tolerating her medications. No new issues since last visit. decreased ability to use legs for bridging/pushing/impaired ability to control trunk for mobility

## 2025-01-12 LAB
ANION GAP SERPL CALC-SCNC: 11 MMOL/L — SIGNIFICANT CHANGE UP (ref 7–14)
BUN SERPL-MCNC: 12 MG/DL — SIGNIFICANT CHANGE UP (ref 7–23)
CALCIUM SERPL-MCNC: 9.1 MG/DL — SIGNIFICANT CHANGE UP (ref 8.4–10.5)
CHLORIDE SERPL-SCNC: 103 MMOL/L — SIGNIFICANT CHANGE UP (ref 98–107)
CO2 SERPL-SCNC: 25 MMOL/L — SIGNIFICANT CHANGE UP (ref 22–31)
CORTIS AM PEAK SERPL-MCNC: 7.4 UG/DL — SIGNIFICANT CHANGE UP (ref 6–18.4)
CREAT SERPL-MCNC: 0.7 MG/DL — SIGNIFICANT CHANGE UP (ref 0.5–1.3)
EGFR: 114 ML/MIN/1.73M2 — SIGNIFICANT CHANGE UP
GLUCOSE BLDC GLUCOMTR-MCNC: 119 MG/DL — HIGH (ref 70–99)
GLUCOSE BLDC GLUCOMTR-MCNC: 88 MG/DL — SIGNIFICANT CHANGE UP (ref 70–99)
GLUCOSE BLDC GLUCOMTR-MCNC: 95 MG/DL — SIGNIFICANT CHANGE UP (ref 70–99)
GLUCOSE BLDC GLUCOMTR-MCNC: 98 MG/DL — SIGNIFICANT CHANGE UP (ref 70–99)
GLUCOSE SERPL-MCNC: 81 MG/DL — SIGNIFICANT CHANGE UP (ref 70–99)
HCT VFR BLD CALC: 33 % — LOW (ref 34.5–45)
HGB BLD-MCNC: 10.8 G/DL — LOW (ref 11.5–15.5)
MCHC RBC-ENTMCNC: 31 PG — SIGNIFICANT CHANGE UP (ref 27–34)
MCHC RBC-ENTMCNC: 32.7 G/DL — SIGNIFICANT CHANGE UP (ref 32–36)
MCV RBC AUTO: 94.8 FL — SIGNIFICANT CHANGE UP (ref 80–100)
NRBC # BLD: 0 /100 WBCS — SIGNIFICANT CHANGE UP (ref 0–0)
NRBC # FLD: 0 K/UL — SIGNIFICANT CHANGE UP (ref 0–0)
PLATELET # BLD AUTO: 145 K/UL — LOW (ref 150–400)
POTASSIUM SERPL-MCNC: 4.5 MMOL/L — SIGNIFICANT CHANGE UP (ref 3.5–5.3)
POTASSIUM SERPL-SCNC: 4.5 MMOL/L — SIGNIFICANT CHANGE UP (ref 3.5–5.3)
RBC # BLD: 3.48 M/UL — LOW (ref 3.8–5.2)
RBC # FLD: 15.6 % — HIGH (ref 10.3–14.5)
SODIUM SERPL-SCNC: 139 MMOL/L — SIGNIFICANT CHANGE UP (ref 135–145)
WBC # BLD: 7.94 K/UL — SIGNIFICANT CHANGE UP (ref 3.8–10.5)
WBC # FLD AUTO: 7.94 K/UL — SIGNIFICANT CHANGE UP (ref 3.8–10.5)

## 2025-01-12 PROCEDURE — 99232 SBSQ HOSP IP/OBS MODERATE 35: CPT

## 2025-01-12 RX ADMIN — Medication 50 MILLIGRAM(S): at 08:35

## 2025-01-12 RX ADMIN — CLONIDINE HYDROCHLORIDE 0.1 MILLIGRAM(S): 0.2 TABLET ORAL at 22:26

## 2025-01-12 RX ADMIN — Medication 30 MILLILITER(S): at 20:47

## 2025-01-12 RX ADMIN — LEVOTHYROXINE SODIUM 100 MICROGRAM(S): 175 TABLET ORAL at 07:20

## 2025-01-12 RX ADMIN — APIXABAN 5 MILLIGRAM(S): 5 TABLET, FILM COATED ORAL at 17:41

## 2025-01-12 RX ADMIN — SUCRALFATE 1 GRAM(S): 1 SUSPENSION ORAL at 07:19

## 2025-01-12 RX ADMIN — SUCRALFATE 1 GRAM(S): 1 SUSPENSION ORAL at 17:41

## 2025-01-12 RX ADMIN — ROSUVASTATIN 5 MILLIGRAM(S): 40 TABLET, FILM COATED ORAL at 22:26

## 2025-01-12 RX ADMIN — QUETIAPINE FUMARATE 225 MILLIGRAM(S): 100 TABLET, FILM COATED ORAL at 22:25

## 2025-01-12 RX ADMIN — DIVALPROEX SODIUM 750 MILLIGRAM(S): 500 TABLET, DELAYED RELEASE ORAL at 17:42

## 2025-01-12 RX ADMIN — Medication 3 MILLIGRAM(S): at 22:26

## 2025-01-12 RX ADMIN — Medication 50 MILLIGRAM(S): at 16:28

## 2025-01-12 RX ADMIN — NALTREXONE HYDROCHLORIDE 50 MILLIGRAM(S): 50 TABLET, FILM COATED ORAL at 12:04

## 2025-01-12 RX ADMIN — ESCITALOPRAM OXALATE 10 MILLIGRAM(S): 10 TABLET ORAL at 12:03

## 2025-01-12 RX ADMIN — APIXABAN 5 MILLIGRAM(S): 5 TABLET, FILM COATED ORAL at 07:20

## 2025-01-12 RX ADMIN — RISPERIDONE 1 MILLIGRAM(S): 0.5 TABLET ORAL at 22:25

## 2025-01-12 RX ADMIN — Medication 50 MILLIGRAM(S): at 22:25

## 2025-01-12 RX ADMIN — DIVALPROEX SODIUM 750 MILLIGRAM(S): 500 TABLET, DELAYED RELEASE ORAL at 07:19

## 2025-01-12 NOTE — CHART NOTE - NSCHARTNOTEFT_GEN_A_CORE
Pt is declining Ultrasound w/u for Right hand swelling, as per pt prior w/u negative.
Pt with isolated right hand swelling, as per pt this has been chronic for her, present for years, has had prior Xray and US which were negative, none noted in sunrise. As per swelling increases at night and resolves during the day. Right hand pink,  warm to touch, hand  limited due to the swelling, pt denies any pain, radial pulse 2+ palpable. Will check Va duplex RUE, r/oDVT, however pt is already on Eliquis for prior Hx DVT 2016.
Patient Demographic Information (PDI)       PDI	First Name	Last Name	Birth Date	Gender	Street Address	[facility]	Zip Code  GINO Marte	1985	Female	[address]	[address]    Prescription Information      PDI Filter:    PDI	My Rx	Current Rx	Drug Type	Rx Written	Rx Dispensed	Drug	Quantity	Days Supply	Prescriber Name	Prescriber SHERRI #	Payment Method	Dispenser  A	N	Y	B	01/06/2025	01/06/2025	clonazepam 1 mg tablet	60	30	Carmen Weiner MD	[id number]	Insurance	[facility]  A	N	N	O	12/12/2024	12/13/2024	oxycodone-acetaminophen 5-325 mg tab	4	1	Lazarus, Max (DO)	[id number]	Insurance	[facility]  A	N	Y	B	12/09/2024	12/12/2024	clonazepam 1 mg tablet	30	30	Carmen Weiner MD	[id number]	Insurance	[facility]  A	N	N	B	12/04/2024	12/06/2024	alprazolam 0.25 mg tablet	30	30	Saúl Bowens, NANCY Mph,PA-C	[id number]	Insurance	[facility]  A	N	N	O	08/11/2024	08/12/2024	oxycodone-acetaminophen 5-325 mg tab	4	1	Kevin Samaniego)	[id number]	Insurance	[facility]  A	N	N	O	08/06/2024	08/06/2024	oxycodone hcl (ir) 5 mg tablet	5	2	Breanna Brooks	[id number]	Insurance	[facility]

## 2025-01-13 ENCOUNTER — TRANSCRIPTION ENCOUNTER (OUTPATIENT)
Age: 40
End: 2025-01-13

## 2025-01-13 LAB
ANION GAP SERPL CALC-SCNC: 12 MMOL/L — SIGNIFICANT CHANGE UP (ref 7–14)
BUN SERPL-MCNC: 11 MG/DL — SIGNIFICANT CHANGE UP (ref 7–23)
CALCIUM SERPL-MCNC: 9.1 MG/DL — SIGNIFICANT CHANGE UP (ref 8.4–10.5)
CHLORIDE SERPL-SCNC: 103 MMOL/L — SIGNIFICANT CHANGE UP (ref 98–107)
CO2 SERPL-SCNC: 24 MMOL/L — SIGNIFICANT CHANGE UP (ref 22–31)
CREAT SERPL-MCNC: 0.68 MG/DL — SIGNIFICANT CHANGE UP (ref 0.5–1.3)
EGFR: 115 ML/MIN/1.73M2 — SIGNIFICANT CHANGE UP
GLUCOSE BLDC GLUCOMTR-MCNC: 100 MG/DL — HIGH (ref 70–99)
GLUCOSE BLDC GLUCOMTR-MCNC: 111 MG/DL — HIGH (ref 70–99)
GLUCOSE BLDC GLUCOMTR-MCNC: 116 MG/DL — HIGH (ref 70–99)
GLUCOSE BLDC GLUCOMTR-MCNC: 131 MG/DL — HIGH (ref 70–99)
GLUCOSE SERPL-MCNC: 90 MG/DL — SIGNIFICANT CHANGE UP (ref 70–99)
HCT VFR BLD CALC: 33.6 % — LOW (ref 34.5–45)
HGB BLD-MCNC: 10.8 G/DL — LOW (ref 11.5–15.5)
MCHC RBC-ENTMCNC: 30.6 PG — SIGNIFICANT CHANGE UP (ref 27–34)
MCHC RBC-ENTMCNC: 32.1 G/DL — SIGNIFICANT CHANGE UP (ref 32–36)
MCV RBC AUTO: 95.2 FL — SIGNIFICANT CHANGE UP (ref 80–100)
NRBC # BLD: 0 /100 WBCS — SIGNIFICANT CHANGE UP (ref 0–0)
NRBC # FLD: 0 K/UL — SIGNIFICANT CHANGE UP (ref 0–0)
PLATELET # BLD AUTO: 142 K/UL — LOW (ref 150–400)
POTASSIUM SERPL-MCNC: 4.3 MMOL/L — SIGNIFICANT CHANGE UP (ref 3.5–5.3)
POTASSIUM SERPL-SCNC: 4.3 MMOL/L — SIGNIFICANT CHANGE UP (ref 3.5–5.3)
RBC # BLD: 3.53 M/UL — LOW (ref 3.8–5.2)
RBC # FLD: 15.8 % — HIGH (ref 10.3–14.5)
SODIUM SERPL-SCNC: 139 MMOL/L — SIGNIFICANT CHANGE UP (ref 135–145)
WBC # BLD: 8.38 K/UL — SIGNIFICANT CHANGE UP (ref 3.8–10.5)
WBC # FLD AUTO: 8.38 K/UL — SIGNIFICANT CHANGE UP (ref 3.8–10.5)

## 2025-01-13 PROCEDURE — 99222 1ST HOSP IP/OBS MODERATE 55: CPT

## 2025-01-13 PROCEDURE — 99232 SBSQ HOSP IP/OBS MODERATE 35: CPT

## 2025-01-13 RX ADMIN — NALTREXONE HYDROCHLORIDE 50 MILLIGRAM(S): 50 TABLET, FILM COATED ORAL at 11:57

## 2025-01-13 RX ADMIN — RISPERIDONE 1 MILLIGRAM(S): 0.5 TABLET ORAL at 22:15

## 2025-01-13 RX ADMIN — CLONIDINE HYDROCHLORIDE 0.1 MILLIGRAM(S): 0.2 TABLET ORAL at 22:17

## 2025-01-13 RX ADMIN — Medication 50 MILLIGRAM(S): at 22:12

## 2025-01-13 RX ADMIN — Medication 3 MILLIGRAM(S): at 22:12

## 2025-01-13 RX ADMIN — ESCITALOPRAM OXALATE 10 MILLIGRAM(S): 10 TABLET ORAL at 11:56

## 2025-01-13 RX ADMIN — SUCRALFATE 1 GRAM(S): 1 SUSPENSION ORAL at 05:07

## 2025-01-13 RX ADMIN — APIXABAN 5 MILLIGRAM(S): 5 TABLET, FILM COATED ORAL at 05:07

## 2025-01-13 RX ADMIN — Medication 30 MILLILITER(S): at 12:40

## 2025-01-13 RX ADMIN — SUCRALFATE 1 GRAM(S): 1 SUSPENSION ORAL at 18:15

## 2025-01-13 RX ADMIN — Medication 30 MILLILITER(S): at 05:44

## 2025-01-13 RX ADMIN — ROSUVASTATIN 5 MILLIGRAM(S): 40 TABLET, FILM COATED ORAL at 22:12

## 2025-01-13 RX ADMIN — APIXABAN 5 MILLIGRAM(S): 5 TABLET, FILM COATED ORAL at 18:14

## 2025-01-13 RX ADMIN — DIVALPROEX SODIUM 750 MILLIGRAM(S): 500 TABLET, DELAYED RELEASE ORAL at 05:08

## 2025-01-13 RX ADMIN — Medication 50 MILLIGRAM(S): at 14:04

## 2025-01-13 RX ADMIN — LEVOTHYROXINE SODIUM 100 MICROGRAM(S): 175 TABLET ORAL at 05:07

## 2025-01-13 RX ADMIN — QUETIAPINE FUMARATE 225 MILLIGRAM(S): 100 TABLET, FILM COATED ORAL at 22:15

## 2025-01-13 RX ADMIN — DIVALPROEX SODIUM 750 MILLIGRAM(S): 500 TABLET, DELAYED RELEASE ORAL at 18:15

## 2025-01-13 RX ADMIN — Medication 50 MILLIGRAM(S): at 05:43

## 2025-01-13 NOTE — CONSULT NOTE ADULT - SUBJECTIVE AND OBJECTIVE BOX
Patient is a 39y old  Female who presents with a chief complaint of     HPI:  39 year-old right-handed woman with a PMH of obesity, factor V Leiden deficiency a/w DVT/PE (on Eliquis 5mg BID), bipolar disorder, hypothyroidism, PNES, IBS, GERD, PID, endometriosis, asthma, personality disorder,  mild intellectual disability, CVA w. residual L sided weakness  presenting from group home for left sided weakness, found to have hypoglycemia. Pt states she weakness began 1/8/2025 in the morning. Endorses residual L sided weakness from previous stroke but states this felt "worse." Describes not being able to move L foot which she was able to do previously. Also w/ decreased movement of L upper ext. States prior to worsening of L sided weakness, she had headaches (which pt states is chronic), also had dizziness, N/V and diarrhea. N/V and diarrhea now resolved, got peptobismol at group home. States dizziness also resolved. Still w/ some headaches. No photophobia/phonophobia.   ROS otherwise positive for chills , no fevers. Also reports "delusions." Describes delusions as a decrease in memory and being  more forgetful recently. Denies auditory or visual hallucinations. Denies SI/HI. ROS Otherwise negative. Denies fevers, chest pain, palpitations, SOB, abdominal pain, constipation, dysuria. No seizure activity. Regarding hypoglycemia, pt states likely new. However group home does not check her FS so pt unsure if she's been hypoglycemic in the past.    (10 James 2025 10:29)      REVIEW OF SYSTEMS  Constitutional - No fever, No weight loss, No fatigue  HEENT - No eye pain, No visual disturbances, No difficulty hearing, No tinnitus, No vertigo, No neck pain  Respiratory - No cough, No wheezing, No shortness of breath  Cardiovascular - No chest pain, No palpitations  Gastrointestinal - No abdominal pain, No nausea, No vomiting, No diarrhea, No constipation  Genitourinary - No dysuria, No frequency, No hematuria, No incontinence  Neurological - No headaches, No memory loss, No loss of strength, No numbness, No tremors  Skin - No itching, No rashes, No lesions   Endocrine - No temperature intolerance  Musculoskeletal - No joint pain, No joint swelling, No muscle pain  Psychiatric - No depression, No anxiety    PAST MEDICAL & SURGICAL HISTORY  Asthma    Seizure    Factor V Leiden    Bipolar disorder    Endometriosis    History of DVT in adulthood    Seizures    Hypothyroid    Seasonal allergies    Insomnia    GERD (gastroesophageal reflux disease)    HTN (hypertension)    Diabetes    DVT (deep venous thrombosis)    Factor V Leiden    No significant past surgical history    No significant past surgical history    S/P appendectomy        SOCIAL HISTORY  Smoking - Denied  EtOH - Denied   Drugs - Denied    FUNCTIONAL HISTORY  Lives in group home per chart  Independent    CURRENT FUNCTIONAL STATUS  1/11  Bed Mobility: Sit to Supine:     · Level of [place]	minimum assist (75% patients effort); Pt required assistance moving left LE  · Physical Assist/Nonphysical Assist	1 person assist; nonverbal cues (demo/gestures); verbal cues  · Assistive Device	bed rails    Bed Mobility: Supine to Sit:     · Level of [place]	contact guard; minimum assist (75% patients effort); Pt required assistance moving left LE  · Physical Assist/Nonphysical Assist	1 person assist; verbal cues; nonverbal cues (demo/gestures)  · Assistive Device	bed rails    Bed Mobility Analysis:     · Bed Mobility Limitations	impaired ability to control trunk for mobility; decreased ability to use legs for bridging/pushing  · Impairments Contributing to Impaired Bed Mobility	impaired postural control; decreased strength; decreased sensation    Transfer: Sit to Stand:     · Level of [place]	minimum assist (75% patients effort); Pt noted to be slightly unsteady while standing.  · Physical Assist/Nonphysical Assist	1 person assist; nonverbal cues (demo/gestures); verbal cues  · Assistive Device	hand held assistance    Transfer: Stand to Sit:     · Level of [place]	minimum assist (75% patients effort)  · Physical Assist/Nonphysical Assist	1 person assist; verbal cues; supervision  · Assistive Device	hand held assistance    Sit/Stand Transfer Safety Analysis:     · Transfer Safety Concerns Noted	decreased step length  · Impairments Contributing to Impaired Transfers	impaired postural control; decreased strength; decreased sensation      FAMILY HISTORY    Family history of DVT (Mother)        RECENT LABS/IMAGING  CBC Full  -  ( 13 Jan 2025 05:30 )  WBC Count : 8.38 K/uL  RBC Count : 3.53 M/uL  Hemoglobin : 10.8 g/dL  Hematocrit : 33.6 %  Platelet Count - Automated : 142 K/uL  Mean Cell Volume : 95.2 fL  Mean Cell Hemoglobin : 30.6 pg  Mean Cell Hemoglobin Concentration : 32.1 g/dL  Auto Neutrophil # : x  Auto Lymphocyte # : x  Auto Monocyte # : x  Auto Eosinophil # : x  Auto Basophil # : x  Auto Neutrophil % : x  Auto Lymphocyte % : x  Auto Monocyte % : x  Auto Eosinophil % : x  Auto Basophil % : x    01-13    139  |  103  |  11  ----------------------------<  90  4.3   |  24  |  0.68    Ca    9.1      13 Jan 2025 05:30      Urinalysis Basic - ( 13 Jan 2025 05:30 )    Color: x / Appearance: x / SG: x / pH: x  Gluc: 90 mg/dL / Ketone: x  / Bili: x / Urobili: x   Blood: x / Protein: x / Nitrite: x   Leuk Esterase: x / RBC: x / WBC x   Sq Epi: x / Non Sq Epi: x / Bacteria: x        VITALS  T(C): 36.8 (01-13-25 @ 05:11), Max: 36.9 (01-12-25 @ 18:00)  HR: 91 (01-13-25 @ 05:11) (86 - 107)  BP: 100/61 (01-13-25 @ 05:11) (93/53 - 100/61)  RR: 17 (01-13-25 @ 05:11) (17 - 19)  SpO2: 98% (01-13-25 @ 05:11) (94% - 98%)  Wt(kg): --    ALLERGIES  TraZODone Hydrochloride (Unknown)  TraMADol Hydrochloride (Unknown)  gabapentin (Unknown)  ibuprofen (Pruritus)  penicillin (Hives)  trazodone (Other)  Toradol (Hives)  [This allergen will not trigger allergy alert] Toradol SOLN (Unknown)  latex (Blisters)  [This allergen will not trigger allergy alert] pineapple allergenic extract (Swelling)  Zofran (Hives)      MEDICATIONS   acetaminophen     Tablet .. 650 milliGRAM(s) Oral every 6 hours PRN  albuterol/ipratropium for Nebulization 3 milliLiter(s) Nebulizer every 6 hours PRN  aluminum hydroxide/magnesium hydroxide/simethicone Suspension 30 milliLiter(s) Oral every 4 hours PRN  apixaban 5 milliGRAM(s) Oral two times a day  cloNIDine 0.1 milliGRAM(s) Oral at bedtime  cyclobenzaprine 5 milliGRAM(s) Oral three times a day PRN  dextrose 5%. 1000 milliLiter(s) IV Continuous <Continuous>  dextrose 5%. 1000 milliLiter(s) IV Continuous <Continuous>  dextrose 50% Injectable 25 Gram(s) IV Push once  dextrose 50% Injectable 12.5 Gram(s) IV Push once  dextrose 50% Injectable 25 Gram(s) IV Push once  dextrose Oral Gel 15 Gram(s) Oral once  diphenhydrAMINE 50 milliGRAM(s) Oral every 6 hours PRN  diphenhydrAMINE 50 milliGRAM(s) Oral at bedtime  divalproex  milliGRAM(s) Oral two times a day  escitalopram 10 milliGRAM(s) Oral daily  glucagon  Injectable 1 milliGRAM(s) IntraMuscular once  levothyroxine 100 MICROGram(s) Oral daily  melatonin 3 milliGRAM(s) Oral at bedtime  naltrexone 50 milliGRAM(s) Oral daily  oxycodone    5 mG/acetaminophen 325 mG 1 Tablet(s) Oral every 6 hours PRN  QUEtiapine 225 milliGRAM(s) Oral at bedtime  risperiDONE   Tablet 1 milliGRAM(s) Oral at bedtime  rosuvastatin 5 milliGRAM(s) Oral at bedtime  sucralfate 1 Gram(s) Oral two times a day      ----------------------------------------------------------------------------------------  PHYSICAL EXAM  Constitutional - NAD, Comfortable  HEENT - NCAT, EOMI  Neck - Supple, No limited ROM  Chest - CTA bilaterally, No wheeze, No rhonchi, No crackles  Cardiovascular - RRR, S1S2, No murmurs  Abdomen - BS+, Soft, NTND  Extremities - No C/C/E, No calf tenderness   Neurologic Exam -                    Cognitive - Awake, Alert, AAO to self, place, date, year, situation     Communication - Fluent, No dysarthria     Cranial Nerves - CN 2-12 intact     Motor - No focal deficits                    LEFT    UE - ShAB 5/5, EF 5/5, EE 5/5, WE 5/5,  5/5                    RIGHT UE - ShAB 5/5, EF 5/5, EE 5/5, WE 5/5,  5/5                    LEFT    LE - HF 5/5, KE 5/5, DF 5/5, PF 5/5                    RIGHT LE - HF 5/5, KE 5/5, DF 5/5, PF 5/5        Sensory - Intact to LT     Reflexes - DTR Intact, No primitive reflexive     Coordination - FTN intact     OculoVestibular - No saccades, No nystagmus, VOR         Balance - WNL Static  Psychiatric - Mood stable, Affect WNL  ----------------------------------------------------------------------------------------  ASSESSMENT/PLAN    Pain -  DVT PPX -   Rehab -    incomplete note, consult in progress Patient is a 39y old  Female who presents with a chief complaint of     HPI:  39 year-old right-handed woman with a PMH of obesity, factor V Leiden deficiency a/w DVT/PE (on Eliquis 5mg BID), bipolar disorder, hypothyroidism, PNES, IBS, GERD, PID, endometriosis, asthma, personality disorder,  mild intellectual disability, CVA w. residual L sided weakness  presenting from group home for left sided weakness, found to have hypoglycemia. Pt states she weakness began 1/8/2025 in the morning. Endorses residual L sided weakness from previous stroke but states this felt "worse." Describes not being able to move L foot which she was able to do previously. Also w/ decreased movement of L upper ext. States prior to worsening of L sided weakness, she had headaches (which pt states is chronic), also had dizziness, N/V and diarrhea. N/V and diarrhea now resolved, got peptobismol at group home. States dizziness also resolved. Still w/ some headaches. No photophobia/phonophobia.   ROS otherwise positive for chills , no fevers. Also reports "delusions." Describes delusions as a decrease in memory and being  more forgetful recently. Denies auditory or visual hallucinations. Denies SI/HI. ROS Otherwise negative. Denies fevers, chest pain, palpitations, SOB, abdominal pain, constipation, dysuria. No seizure activity. Regarding hypoglycemia, pt states likely new. However group home does not check her FS so pt unsure if she's been hypoglycemic in the past.    (10 James 2025 10:29)    PT states she ambulates with RW at baseline, feels L sided weakness worse than baseline.  states she had N/V during admission but none currently  States she was recently in rehab and prefers discharge home.    REVIEW OF SYSTEMS  Constitutional - No fever, No weight loss, No fatigue  HEENT - No eye pain, No visual disturbances, No difficulty hearing, No tinnitus, No vertigo, No neck pain  Respiratory - No cough, No wheezing, No shortness of breath  Cardiovascular - No chest pain, No palpitations  Gastrointestinal - No abdominal pain, No nausea, No vomiting, No diarrhea, No constipation  Genitourinary - No dysuria, No frequency, No hematuria, No incontinence  Neurological - No headaches, No memory loss, + loss of strength, No numbness, No tremors  Skin - No itching, No rashes, No lesions   Endocrine - No temperature intolerance  Musculoskeletal - No joint pain, No joint swelling, No muscle pain  Psychiatric - No depression, No anxiety    PAST MEDICAL & SURGICAL HISTORY  Asthma    Seizure    Factor V Leiden    Bipolar disorder    Endometriosis    History of DVT in adulthood    Seizures    Hypothyroid    Seasonal allergies    Insomnia    GERD (gastroesophageal reflux disease)    HTN (hypertension)    Diabetes    DVT (deep venous thrombosis)    Factor V Leiden    No significant past surgical history    No significant past surgical history    S/P appendectomy        SOCIAL HISTORY  Smoking - Denied  EtOH - Denied   Drugs - Denied    FUNCTIONAL HISTORY  Lives in group home- per patient meals and medications provided  Independent with RW    CURRENT FUNCTIONAL STATUS  1/11  Bed Mobility: Sit to Supine:     · Level of [place]	minimum assist (75% patients effort); Pt required assistance moving left LE  · Physical Assist/Nonphysical Assist	1 person assist; nonverbal cues (demo/gestures); verbal cues  · Assistive Device	bed rails    Bed Mobility: Supine to Sit:     · Level of [place]	contact guard; minimum assist (75% patients effort); Pt required assistance moving left LE  · Physical Assist/Nonphysical Assist	1 person assist; verbal cues; nonverbal cues (demo/gestures)  · Assistive Device	bed rails    Bed Mobility Analysis:     · Bed Mobility Limitations	impaired ability to control trunk for mobility; decreased ability to use legs for bridging/pushing  · Impairments Contributing to Impaired Bed Mobility	impaired postural control; decreased strength; decreased sensation    Transfer: Sit to Stand:     · Level of [place]	minimum assist (75% patients effort); Pt noted to be slightly unsteady while standing.  · Physical Assist/Nonphysical Assist	1 person assist; nonverbal cues (demo/gestures); verbal cues  · Assistive Device	hand held assistance    Transfer: Stand to Sit:     · Level of [place]	minimum assist (75% patients effort)  · Physical Assist/Nonphysical Assist	1 person assist; verbal cues; supervision  · Assistive Device	hand held assistance    Sit/Stand Transfer Safety Analysis:     · Transfer Safety Concerns Noted	decreased step length  · Impairments Contributing to Impaired Transfers	impaired postural control; decreased strength; decreased sensation      FAMILY HISTORY    Family history of DVT (Mother)        RECENT LABS/IMAGING  < from: CT Angio Neck w/ IV Cont (01.09.25 @ 21:59) >    ACC: [id number] EXAM:  CT ANGIO BRAIN (W)AW IC   ORDERED BY: JOZEF LINO     ACC: [id number] EXAM:  CT ANGIO NECK (W)AW IC   ORDERED BY: JOZEF LINO     PROCEDURE DATE:  01/09/2025          INTERPRETATION:  CLINICAL INFORMATION: L weakness    COMPARISON: CT head 11/19/2024, CTA head and neck 9/12/2024.    CONTRAST:  IV Contrast: Omnipaque 350 90 cc administered  10 cc discarded  .    TECHNIQUE:  CT BRAIN: Serial axial images were obtained from the skull base to the   vertex without the use of contrast. Post-contrast images were obtained   following CTA.    CTA NECK/HEAD: After the intravenous power injection of non-ionic   contrast material, serial thin sections were obtained through the   cervical and intracranial circulation on a multislice CT scanner. Axial,   Coronal and Sagittal MIP reformatted images were obtained. 3D   reconstruction was also performed.    FINDINGS:    CT BRAIN:    VENTRICLES AND SULCI: Normal in size and configuration.  INTRA-AXIAL: No mass effect, acute hemorrhage,or midline shift.  EXTRA-AXIAL: No mass or fluid collection. Basal cisterns are normal in   appearance.    VISUALIZED SINUSES:  Left maxillary sinus retention cyst. No air-fluid   levels.  TYMPANOMASTOID CAVITIES:  Clear.  VISUALIZED ORBITS: Normal.  CALVARIUM: Intact. Stable subcentimeter inferior clival bone island.      CTA NECK:    AORTIC ARCH AND VISUALIZED GREAT VESSELS: Within normal limits.    RIGHT:  COMMON CAROTID ARTERY: No significant stenosis to the carotid bifurcation.  INTERNAL CAROTID ARTERY: No significant stenosis based on NASCET criteria.  VERTEBRAL ARTERY: Normal in course and caliber to the intracranial   circulation.    LEFT:  COMMON CAROTID ARTERY: No significant stenosis to the carotid bifurcation.  INTERNAL CAROTID ARTERY: No significant stenosis based on NASCET criteria.  VERTEBRAL ARTERY: Normal in course and caliber to the intracranial   circulation.    VISUALIZED LUNGS: Clear.    MISCELLANEOUS: None.    CAROTID STENOSIS REFERENCE: Percent (%) stenosis is expressed in terms of   NASCET Criteria. (NASCET = 100x1-(N/D)). N=greatest narrowing. D=normal   distal diameter - MILD = <50% stenosis. - MODERATE = 50-69% stenosis. -   SEVERE = 70-89% stenosis. - HAIRLINE/CRITICAL = 90-99% stenosis. -   OCCLUDED = 100%stenosis.      CTA HEAD:    INTERNAL CAROTID ARTERIES: Bilateral petrous, precavernous, cavernous,   and supraclinoid regions are patent without significant stenosis.    [place] OF ALFONSO: No aneurysm identified.    ANTERIOR CEREBRAL ARTERIES: No significant stenosis or occlusion.  MIDDLE CEREBRAL ARTERIES: No significant stenosis or occlusion.  POSTERIOR CEREBRAL ARTERIES: No significant stenosis or occlusion.    DISTAL VERTEBRAL / BASILAR ARTERIES: No significant stenosis or occlusion.    VENOUSSTRUCTURES: Visualized superficial and deep venous structures   appear patent.    MISCELLANEOUS: No other vascular abnormality is seen.      IMPRESSION:    CT HEAD:  No acute intracranial hemorrhage, mass effect, or midline shift.    CTA NECK:  No significant stenosis or occlusion.    CTA HEAD:  No large vessel occlusion, significant stenosis or vascular abnormality   identified.        --- End of Report ---          RASHMI MYLES MD; Resident Radiologist  This document has been electronically signed.  CHATA SOLOMON MD; Attending Radiologist  This document has been electronically signed. Jan 9 2025 10:44PM    < end of copied text >    CBC Full  -  ( 13 Jan 2025 05:30 )  WBC Count : 8.38 K/uL  RBC Count : 3.53 M/uL  Hemoglobin : 10.8 g/dL  Hematocrit : 33.6 %  Platelet Count - Automated : 142 K/uL  Mean Cell Volume : 95.2 fL  Mean Cell Hemoglobin : 30.6 pg  Mean Cell Hemoglobin Concentration : 32.1 g/dL  Auto Neutrophil # : x  Auto Lymphocyte # : x  Auto Monocyte # : x  Auto Eosinophil # : x  Auto Basophil # : x  Auto Neutrophil % : x  Auto Lymphocyte % : x  Auto Monocyte % : x  Auto Eosinophil % : x  Auto Basophil % : x    01-13    139  |  103  |  11  ----------------------------<  90  4.3   |  24  |  0.68    Ca    9.1      13 Jan 2025 05:30      Urinalysis Basic - ( 13 Jan 2025 05:30 )    Color: x / Appearance: x / SG: x / pH: x  Gluc: 90 mg/dL / Ketone: x  / Bili: x / Urobili: x   Blood: x / Protein: x / Nitrite: x   Leuk Esterase: x / RBC: x / WBC x   Sq Epi: x / Non Sq Epi: x / Bacteria: x        VITALS  T(C): 36.8 (01-13-25 @ 05:11), Max: 36.9 (01-12-25 @ 18:00)  HR: 91 (01-13-25 @ 05:11) (86 - 107)  BP: 100/61 (01-13-25 @ 05:11) (93/53 - 100/61)  RR: 17 (01-13-25 @ 05:11) (17 - 19)  SpO2: 98% (01-13-25 @ 05:11) (94% - 98%)  Wt(kg): --    ALLERGIES  TraZODone Hydrochloride (Unknown)  TraMADol Hydrochloride (Unknown)  gabapentin (Unknown)  ibuprofen (Pruritus)  penicillin (Hives)  trazodone (Other)  Toradol (Hives)  [This allergen will not trigger allergy alert] Toradol SOLN (Unknown)  latex (Blisters)  [This allergen will not trigger allergy alert] pineapple allergenic extract (Swelling)  Zofran (Hives)      MEDICATIONS   acetaminophen     Tablet .. 650 milliGRAM(s) Oral every 6 hours PRN  albuterol/ipratropium for Nebulization 3 milliLiter(s) Nebulizer every 6 hours PRN  aluminum hydroxide/magnesium hydroxide/simethicone Suspension 30 milliLiter(s) Oral every 4 hours PRN  apixaban 5 milliGRAM(s) Oral two times a day  cloNIDine 0.1 milliGRAM(s) Oral at bedtime  cyclobenzaprine 5 milliGRAM(s) Oral three times a day PRN  dextrose 5%. 1000 milliLiter(s) IV Continuous <Continuous>  dextrose 5%. 1000 milliLiter(s) IV Continuous <Continuous>  dextrose 50% Injectable 25 Gram(s) IV Push once  dextrose 50% Injectable 12.5 Gram(s) IV Push once  dextrose 50% Injectable 25 Gram(s) IV Push once  dextrose Oral Gel 15 Gram(s) Oral once  diphenhydrAMINE 50 milliGRAM(s) Oral every 6 hours PRN  diphenhydrAMINE 50 milliGRAM(s) Oral at bedtime  divalproex  milliGRAM(s) Oral two times a day  escitalopram 10 milliGRAM(s) Oral daily  glucagon  Injectable 1 milliGRAM(s) IntraMuscular once  levothyroxine 100 MICROGram(s) Oral daily  melatonin 3 milliGRAM(s) Oral at bedtime  naltrexone 50 milliGRAM(s) Oral daily  oxycodone    5 mG/acetaminophen 325 mG 1 Tablet(s) Oral every 6 hours PRN  QUEtiapine 225 milliGRAM(s) Oral at bedtime  risperiDONE   Tablet 1 milliGRAM(s) Oral at bedtime  rosuvastatin 5 milliGRAM(s) Oral at bedtime  sucralfate 1 Gram(s) Oral two times a day      ----------------------------------------------------------------------------------------  PHYSICAL EXAM  Constitutional - NAD, Comfortable  HEENT - NCAT, EOMI  Neck - Supple, No limited ROM  Chest -no respiratory distress  Cardiovascular - RRR, S1S2   Abdomen -   Soft, NTND  Extremities - No C/C/E, No calf tenderness   Neurologic Exam -                    Cognitive - Awake, Alert, AAO to self, place, month but not year     Communication - Fluent, No dysarthria     Cranial Nerves - CN 2-12 intact     Motor -                      LEFT    UE - ShAB 4/5, EF 4/5, EE 4/5, WE 4/5,  4/5                    RIGHT UE - ShAB 5/5, EF 5/5, EE 5/5, WE 5/5,  5/5                    LEFT    LE - HF 3/5, KE 4/5, DF 4/5, PF  4/5                    RIGHT LE - HF 5/5, KE 5/5, DF 5/5, PF 5/5        Sensory - Intact to LT      OculoVestibular - No saccades, No nystagmus, VOR         Balance - WNL Static  Psychiatric - Mood stable, Affect WNL  ----------------------------------------------------------------------------------------  ASSESSMENT/PLAN    39 year-old right-handed woman with a PMH of obesity, factor V Leiden deficiency a/w DVT/PE (on Eliquis 5mg BID), bipolar disorder, hypothyroidism, PNES, IBS, GERD, PID, endometriosis, asthma, personality disorder,  mild intellectual disability, CVA w. residual L sided weakness, presented for L sided weakness (worse than baseline), found to be hypoglycemic  continue bedside PT and OT  - please attempt ambulation with RW  consider neuro evaluation, MRI brain if L sided weakness worse than baseline  Pain -acetaminophen  DVT PPX - on eliquis  Rehab -  required min assist with hand held assist, will monitor for ambulation with device. If returns to baseline function can consider discharge to group home, however currently requires assistance for mobility. She ambulates with RW independently at baseline. Prefers not to go to rehab however agreeable if not yet at baseline function at time of discharge. Acute vs LOY pending diagnosis, further evaluation in progress.

## 2025-01-13 NOTE — DISCHARGE NOTE PROVIDER - NSDCCPCAREPLAN_GEN_ALL_CORE_FT
PRINCIPAL DISCHARGE DIAGNOSIS  Diagnosis: Hypoglycemia  Assessment and Plan of Treatment: Resolved, you have not had any recurrence for days on your normal medications.      SECONDARY DISCHARGE DIAGNOSES  Diagnosis: Left-sided weakness  Assessment and Plan of Treatment: CT scans negative.  Can follow-up with neurology as outpatient.    Diagnosis: Factor V Leiden  Assessment and Plan of Treatment: Continue with your eliquis.    Diagnosis: Bipolar disorder  Assessment and Plan of Treatment: Continue with your psychiatic medications and follow-up with your outpatient psychiatrist.

## 2025-01-13 NOTE — DISCHARGE NOTE PROVIDER - HOSPITAL COURSE
39F obesity, factor V Leiden deficiency a/w DVT/PE (on Eliquis 5mg BID), bipolar disorder, hypothyroidism, PNES, IBS, GERD, PID, endometriosis, asthma, personality disorder,  mild intellectual disability, CVA residual L sided weakness presenting from group home for left sided weakness, found to have hypoglycemia, no clear cause, and no recurrence, no obvious culprit meds (although case reports re: seroquel however has been on this med here w/o issue). Left-sided weakness with CTH/CTA head/neck without acute findings, repeat CTH to assess stability refused.  Swelling of right hand, pt reports chronic and asymtomatic, declined US at this time. Factor V Leiden hx of DVT/ PE  on Eliquis. Bipolar disorder continued home meds (clonidine 0.1 qhs, Benadryl 50 qhs, Depakote 750 bid, Lexapro 10 qd, Seroquel 225 qhs, risperidone 1 qhs). Hypothyroidism  TSH 0.67 c/w synthroid    PT 1/14--> sit to stand, ambulated 100 feet with rolling walker and participated in exercises, no PT needs   pt reports baseline has RW 39F obesity, factor V Leiden deficiency a/w DVT/PE (on Eliquis 5mg BID), bipolar disorder, hypothyroidism, PNES, IBS, GERD, PID, endometriosis, asthma, personality disorder,  mild intellectual disability, CVA residual L sided weakness presenting from group home for left sided weakness, found to have hypoglycemia, no clear cause, and no recurrence, no obvious culprit meds (although case reports re: seroquel however has been on this med here w/o issue). Left-sided weakness with CTH/CTA head/neck without acute findings, repeat CTH to assess stability refused.  Swelling of right hand, pt reports chronic and asymtomatic, declined US at this time. Factor V Leiden hx of DVT/ PE  on Eliquis. Bipolar disorder continued home meds (clonidine 0.1 qhs, Benadryl 50 qhs, Depakote 750 bid, Lexapro 10 qd, Seroquel 225 qhs, risperidone 1 qhs). Hypothyroidism  TSH 0.67 c/w synthroid    PT 1/14--> sit to stand, ambulated 100 feet with rolling walker and participated in exercises, no PT needs   pt reports baseline has RW       Patient does not require daily fingersticks, can have person glucometer and use only in the event of symptoms of hypoglycemia 39F obesity, factor V Leiden deficiency a/w DVT/PE (on Eliquis 5mg BID), bipolar disorder, hypothyroidism, PNES, IBS, GERD, PID, endometriosis, asthma, personality disorder,  mild intellectual disability, CVA residual L sided weakness presenting from group home for left sided weakness, found to have hypoglycemia, no clear cause, and no recurrence, no obvious culprit meds (although case reports re: seroquel however has been on this med here w/o issue). Left-sided weakness with CTH/CTA head/neck without acute findings, repeat CTH to assess stability refused.  Swelling of right hand, pt reports chronic and asymtomatic, declined US at this time. Factor V Leiden hx of DVT/ PE  on Eliquis. Bipolar disorder continued home meds (clonidine 0.1 qhs, Benadryl 50 qhs, Depakote 750 bid, Lexapro 10 qd, Seroquel 225 qhs, risperidone 1 qhs). Hypothyroidism  TSH 0.67 c/w synthroid    PT 1/14--> sit to stand, ambulated 100 feet with rolling walker and participated in exercises, no PT needs   pt reports baseline has RW       Patient does not require daily fingersticks, can have personal glucometer and use only in the event of symptoms of hypoglycemia

## 2025-01-13 NOTE — DISCHARGE NOTE PROVIDER - NSDCMRMEDTOKEN_GEN_ALL_CORE_FT
Albuterol (Eqv-Proventil HFA) 90 mcg/inh inhalation aerosol: 2 puff(s) inhaled every 6 hours as needed for  shortness of breath and/or wheezing  aluminum hydroxide-magnesium hydroxide 200 mg-200 mg/5 mL oral suspension: 30 milliliter(s) orally every 4 hours As needed Dyspepsia  apixaban 5 mg oral tablet: 1 tab(s) orally every 12 hours  Benadryl 50 mg oral capsule: 1 cap(s) orally every 6 hours as needed for  rash or itching  cloNIDine 0.1 mg oral tablet: 1 orally once a day (at bedtime)  Crestor 5 mg oral tablet: 1 tab(s) orally once a day  Depakote 250 mg oral delayed release tablet: 3 tab(s) orally 2 times a day  diphenhydrAMINE 50 mg oral capsule: 1 cap(s) orally once a day (at bedtime)  Flexeril 5 mg oral tablet: 1 tab(s) orally 3 times a day as needed for  moderate pain  levothyroxine 100 mcg (0.1 mg) oral tablet: 1 tab(s) orally once a day  Lexapro 10 mg oral tablet: 1 tab(s) orally once a day  melatonin 3 mg oral tablet: 1 tab(s) orally once a day (at bedtime)  naltrexone 50 mg oral tablet: 1 tab(s) orally once a day  QUEtiapine 200 mg oral tablet: 1 tab(s) orally once a day (at bedtime)  QUEtiapine 25 mg oral tablet: 1 tab(s) orally once a day  risperiDONE 3 mg oral tablet: 1 orally once a day (at bedtime)  sucralfate 1 g oral tablet: 1 tab(s) orally 2 times a day  sucralfate 1 g oral tablet: 1 tab(s) orally every 6 hours as needed for abdominal discomfort   Albuterol (Eqv-Proventil HFA) 90 mcg/inh inhalation aerosol: 2 puff(s) inhaled every 6 hours as needed for  shortness of breath and/or wheezing  aluminum hydroxide-magnesium hydroxide 200 mg-200 mg/5 mL oral suspension: 30 milliliter(s) orally every 4 hours As needed Dyspepsia  apixaban 5 mg oral tablet: 1 tab(s) orally every 12 hours  Benadryl 50 mg oral capsule: 1 cap(s) orally every 6 hours as needed for  rash or itching  cloNIDine 0.1 mg oral tablet: 1 orally once a day (at bedtime)  Crestor 5 mg oral tablet: 1 tab(s) orally once a day  Depakote 250 mg oral delayed release tablet: 3 tab(s) orally 2 times a day  diphenhydrAMINE 50 mg oral capsule: 1 cap(s) orally once a day (at bedtime)  Flexeril 5 mg oral tablet: 1 tab(s) orally 3 times a day as needed for  moderate pain  levothyroxine 100 mcg (0.1 mg) oral tablet: 1 tab(s) orally once a day  Lexapro 10 mg oral tablet: 1 tab(s) orally once a day  melatonin 3 mg oral tablet: 1 tab(s) orally once a day (at bedtime)  naltrexone 50 mg oral tablet: 1 tab(s) orally once a day  oxycodone-acetaminophen 5 mg-325 mg oral tablet: 1 tab(s) orally every 8 hours as needed for Severe Pain (7 - 10) MDD: 3 tablets  QUEtiapine 200 mg oral tablet: 1 tab(s) orally once a day (at bedtime)  QUEtiapine 25 mg oral tablet: 1 tab(s) orally once a day  risperiDONE 3 mg oral tablet: 1 orally once a day (at bedtime)  sucralfate 1 g oral tablet: 1 tab(s) orally 2 times a day  sucralfate 1 g oral tablet: 1 tab(s) orally every 6 hours as needed for abdominal discomfort

## 2025-01-13 NOTE — DISCHARGE NOTE PROVIDER - NSDCACTIVITY_GEN_ALL_CORE
Do not drive or operate machinery/Do not make important decisions/Walking - Indoors allowed/No heavy lifting/straining No restrictions/Walking - Indoors allowed

## 2025-01-14 LAB
ALBUMIN SERPL ELPH-MCNC: 3.6 G/DL — SIGNIFICANT CHANGE UP (ref 3.3–5)
ALP SERPL-CCNC: 69 U/L — SIGNIFICANT CHANGE UP (ref 40–120)
ALT FLD-CCNC: 41 U/L — HIGH (ref 4–33)
ANION GAP SERPL CALC-SCNC: 11 MMOL/L — SIGNIFICANT CHANGE UP (ref 7–14)
AST SERPL-CCNC: 22 U/L — SIGNIFICANT CHANGE UP (ref 4–32)
BASOPHILS # BLD AUTO: 0.04 K/UL — SIGNIFICANT CHANGE UP (ref 0–0.2)
BASOPHILS NFR BLD AUTO: 0.6 % — SIGNIFICANT CHANGE UP (ref 0–2)
BILIRUB SERPL-MCNC: <0.2 MG/DL — SIGNIFICANT CHANGE UP (ref 0.2–1.2)
BUN SERPL-MCNC: 9 MG/DL — SIGNIFICANT CHANGE UP (ref 7–23)
CALCIUM SERPL-MCNC: 9.1 MG/DL — SIGNIFICANT CHANGE UP (ref 8.4–10.5)
CHLORIDE SERPL-SCNC: 102 MMOL/L — SIGNIFICANT CHANGE UP (ref 98–107)
CO2 SERPL-SCNC: 24 MMOL/L — SIGNIFICANT CHANGE UP (ref 22–31)
CREAT SERPL-MCNC: 0.67 MG/DL — SIGNIFICANT CHANGE UP (ref 0.5–1.3)
EGFR: 115 ML/MIN/1.73M2 — SIGNIFICANT CHANGE UP
EOSINOPHIL # BLD AUTO: 0.07 K/UL — SIGNIFICANT CHANGE UP (ref 0–0.5)
EOSINOPHIL NFR BLD AUTO: 1 % — SIGNIFICANT CHANGE UP (ref 0–6)
GLUCOSE BLDC GLUCOMTR-MCNC: 101 MG/DL — HIGH (ref 70–99)
GLUCOSE BLDC GLUCOMTR-MCNC: 109 MG/DL — HIGH (ref 70–99)
GLUCOSE BLDC GLUCOMTR-MCNC: 110 MG/DL — HIGH (ref 70–99)
GLUCOSE SERPL-MCNC: 82 MG/DL — SIGNIFICANT CHANGE UP (ref 70–99)
HCT VFR BLD CALC: 34.2 % — LOW (ref 34.5–45)
HGB BLD-MCNC: 10.8 G/DL — LOW (ref 11.5–15.5)
IANC: 3.18 K/UL — SIGNIFICANT CHANGE UP (ref 1.8–7.4)
IMM GRANULOCYTES NFR BLD AUTO: 2 % — HIGH (ref 0–0.9)
LYMPHOCYTES # BLD AUTO: 2.7 K/UL — SIGNIFICANT CHANGE UP (ref 1–3.3)
LYMPHOCYTES # BLD AUTO: 38.8 % — SIGNIFICANT CHANGE UP (ref 13–44)
MCHC RBC-ENTMCNC: 30.4 PG — SIGNIFICANT CHANGE UP (ref 27–34)
MCHC RBC-ENTMCNC: 31.6 G/DL — LOW (ref 32–36)
MCV RBC AUTO: 96.3 FL — SIGNIFICANT CHANGE UP (ref 80–100)
MONOCYTES # BLD AUTO: 0.82 K/UL — SIGNIFICANT CHANGE UP (ref 0–0.9)
MONOCYTES NFR BLD AUTO: 11.8 % — SIGNIFICANT CHANGE UP (ref 2–14)
NEUTROPHILS # BLD AUTO: 3.18 K/UL — SIGNIFICANT CHANGE UP (ref 1.8–7.4)
NEUTROPHILS NFR BLD AUTO: 45.8 % — SIGNIFICANT CHANGE UP (ref 43–77)
NRBC # BLD: 0 /100 WBCS — SIGNIFICANT CHANGE UP (ref 0–0)
NRBC # FLD: 0 K/UL — SIGNIFICANT CHANGE UP (ref 0–0)
PLATELET # BLD AUTO: 124 K/UL — LOW (ref 150–400)
POTASSIUM SERPL-MCNC: 4.5 MMOL/L — SIGNIFICANT CHANGE UP (ref 3.5–5.3)
POTASSIUM SERPL-SCNC: 4.5 MMOL/L — SIGNIFICANT CHANGE UP (ref 3.5–5.3)
PROT SERPL-MCNC: 6.6 G/DL — SIGNIFICANT CHANGE UP (ref 6–8.3)
RBC # BLD: 3.55 M/UL — LOW (ref 3.8–5.2)
RBC # FLD: 15.9 % — HIGH (ref 10.3–14.5)
SODIUM SERPL-SCNC: 137 MMOL/L — SIGNIFICANT CHANGE UP (ref 135–145)
WBC # BLD: 6.95 K/UL — SIGNIFICANT CHANGE UP (ref 3.8–10.5)
WBC # FLD AUTO: 6.95 K/UL — SIGNIFICANT CHANGE UP (ref 3.8–10.5)

## 2025-01-14 PROCEDURE — 99232 SBSQ HOSP IP/OBS MODERATE 35: CPT

## 2025-01-14 RX ADMIN — DIVALPROEX SODIUM 750 MILLIGRAM(S): 500 TABLET, DELAYED RELEASE ORAL at 06:50

## 2025-01-14 RX ADMIN — APIXABAN 5 MILLIGRAM(S): 5 TABLET, FILM COATED ORAL at 17:02

## 2025-01-14 RX ADMIN — ESCITALOPRAM OXALATE 10 MILLIGRAM(S): 10 TABLET ORAL at 12:37

## 2025-01-14 RX ADMIN — Medication 3 MILLIGRAM(S): at 22:06

## 2025-01-14 RX ADMIN — QUETIAPINE FUMARATE 225 MILLIGRAM(S): 100 TABLET, FILM COATED ORAL at 22:06

## 2025-01-14 RX ADMIN — CLONIDINE HYDROCHLORIDE 0.1 MILLIGRAM(S): 0.2 TABLET ORAL at 22:06

## 2025-01-14 RX ADMIN — RISPERIDONE 1 MILLIGRAM(S): 0.5 TABLET ORAL at 22:06

## 2025-01-14 RX ADMIN — Medication 50 MILLIGRAM(S): at 14:43

## 2025-01-14 RX ADMIN — Medication 30 MILLILITER(S): at 12:36

## 2025-01-14 RX ADMIN — SUCRALFATE 1 GRAM(S): 1 SUSPENSION ORAL at 17:02

## 2025-01-14 RX ADMIN — SUCRALFATE 1 GRAM(S): 1 SUSPENSION ORAL at 06:51

## 2025-01-14 RX ADMIN — DIVALPROEX SODIUM 750 MILLIGRAM(S): 500 TABLET, DELAYED RELEASE ORAL at 17:01

## 2025-01-14 RX ADMIN — APIXABAN 5 MILLIGRAM(S): 5 TABLET, FILM COATED ORAL at 06:50

## 2025-01-14 RX ADMIN — Medication 30 MILLILITER(S): at 07:16

## 2025-01-14 RX ADMIN — NALTREXONE HYDROCHLORIDE 50 MILLIGRAM(S): 50 TABLET, FILM COATED ORAL at 12:37

## 2025-01-14 RX ADMIN — Medication 50 MILLIGRAM(S): at 22:06

## 2025-01-14 RX ADMIN — Medication 50 MILLIGRAM(S): at 07:15

## 2025-01-14 RX ADMIN — ROSUVASTATIN 5 MILLIGRAM(S): 40 TABLET, FILM COATED ORAL at 22:06

## 2025-01-14 RX ADMIN — LEVOTHYROXINE SODIUM 100 MICROGRAM(S): 175 TABLET ORAL at 06:50

## 2025-01-15 ENCOUNTER — TRANSCRIPTION ENCOUNTER (OUTPATIENT)
Age: 40
End: 2025-01-15

## 2025-01-15 VITALS
DIASTOLIC BLOOD PRESSURE: 80 MMHG | SYSTOLIC BLOOD PRESSURE: 124 MMHG | OXYGEN SATURATION: 99 % | HEART RATE: 89 BPM | TEMPERATURE: 98 F | RESPIRATION RATE: 18 BRPM

## 2025-01-15 LAB
GLUCOSE BLDC GLUCOMTR-MCNC: 126 MG/DL — HIGH (ref 70–99)
GLUCOSE BLDC GLUCOMTR-MCNC: 139 MG/DL — HIGH (ref 70–99)

## 2025-01-15 PROCEDURE — 99239 HOSP IP/OBS DSCHRG MGMT >30: CPT

## 2025-01-15 RX ORDER — ISOPROPYL ALCOHOL, BENZOCAINE .7; .06 ML/ML; ML/ML
0 SWAB TOPICAL
Qty: 100 | Refills: 1
Start: 2025-01-15

## 2025-01-15 RX ORDER — OXYCODONE AND ACETAMINOPHEN 5; 325 MG/1; MG/1
1 TABLET ORAL
Qty: 6 | Refills: 0
Start: 2025-01-15 | End: 2025-01-16

## 2025-01-15 RX ADMIN — APIXABAN 5 MILLIGRAM(S): 5 TABLET, FILM COATED ORAL at 05:16

## 2025-01-15 RX ADMIN — DIVALPROEX SODIUM 750 MILLIGRAM(S): 500 TABLET, DELAYED RELEASE ORAL at 05:17

## 2025-01-15 RX ADMIN — ESCITALOPRAM OXALATE 10 MILLIGRAM(S): 10 TABLET ORAL at 11:17

## 2025-01-15 RX ADMIN — NALTREXONE HYDROCHLORIDE 50 MILLIGRAM(S): 50 TABLET, FILM COATED ORAL at 11:17

## 2025-01-15 RX ADMIN — Medication 50 MILLIGRAM(S): at 15:08

## 2025-01-15 RX ADMIN — LEVOTHYROXINE SODIUM 100 MICROGRAM(S): 175 TABLET ORAL at 05:16

## 2025-01-15 RX ADMIN — SUCRALFATE 1 GRAM(S): 1 SUSPENSION ORAL at 05:16

## 2025-01-15 RX ADMIN — Medication 50 MILLIGRAM(S): at 09:07

## 2025-01-15 NOTE — PROGRESS NOTE ADULT - PROBLEM SELECTOR PLAN 7
Eliquis  f/u need for LOY otherwise return to [facility]
DVT: Eliquis  DIET: DASH  DISPO: inpatient rehab per PT. PMR pending
Eliquis  f/u need for LOY otherwise return to [facility]
DVT: Eliquis  DIET: DASH  DISPO: Likely back to group home. PT Consulted
Eliquis  no PT needs 1/14  had  meeting this am for dc planning for dc today  dc time 36 min

## 2025-01-15 NOTE — PROGRESS NOTE ADULT - PROBLEM SELECTOR PLAN 3
- Per pt this has been chronic for years  - VA duplex RUE, r/o DVT
Per pt this has been chronic for years  - declined US at this time
- Per pt this has been chronic for years  - VA duplex RUE, r/o DVT pending
Per pt this has been chronic for years  - declined US at this time
Per pt this has been chronic for years, unclear if prior injury or when it stated, prior US neg, states waxes and wanes, no pain  - declined US at this time

## 2025-01-15 NOTE — PROGRESS NOTE ADULT - PROBLEM SELECTOR PLAN 4
hx of DVT/ PE 2026  - c/w Eliquis
hx of DVT/ PE 2026  - c/w Eliquis
--W/ hx of DVT/ PE 2026  --C/w Eliquis
--W/ hx of DVT/ PE 2026  --C/w Eliquis
hx of DVT/ PE 2026  - c/w Eliquis

## 2025-01-15 NOTE — PROGRESS NOTE ADULT - ASSESSMENT
39F obesity, factor V Leiden deficiency a/w DVT/PE (on Eliquis 5mg BID), bipolar disorder, hypothyroidism, PNES, IBS, GERD, PID, endometriosis, asthma, personality disorder,  mild intellectual disability, CVA residual L sided weakness  presenting from group home for left sided weakness, found to have hypoglycemia  
39 year-old right-handed woman with a PMH of obesity, factor V Leiden deficiency a/w DVT/PE (on Eliquis 5mg BID), bipolar disorder, hypothyroidism, PNES, IBS, GERD, PID, endometriosis, asthma, personality disorder,  mild intellectual disability, CVA w. residual L sided weakness  presenting from group home for left sided weakness, found to have hypoglycemia  
39F obesity, factor V Leiden deficiency a/w DVT/PE (on Eliquis 5mg BID), bipolar disorder, hypothyroidism, PNES, IBS, GERD, PID, endometriosis, asthma, personality disorder,  mild intellectual disability, CVA residual L sided weakness  presenting from group home for left sided weakness, found to have hypoglycemia  
39F obesity, factor V Leiden deficiency a/w DVT/PE (on Eliquis 5mg BID), bipolar disorder, hypothyroidism, PNES, IBS, GERD, PID, endometriosis, asthma, personality disorder,  mild intellectual disability, CVA residual L sided weakness  presenting from group home for left sided weakness, found to have hypoglycemia  
39 year-old right-handed woman with a PMH of obesity, factor V Leiden deficiency a/w DVT/PE (on Eliquis 5mg BID), bipolar disorder, hypothyroidism, PNES, IBS, GERD, PID, endometriosis, asthma, personality disorder,  mild intellectual disability, CVA w. residual L sided weakness  presenting from group home for left sided weakness, found to have hypoglycemia

## 2025-01-15 NOTE — DISCHARGE NOTE NURSING/CASE MANAGEMENT/SOCIAL WORK - FINANCIAL ASSISTANCE
[facility] provides services at a reduced cost to those who are determined to be eligible through [facility]’s financial assistance program. Information regarding [facility]’s financial assistance program can be found by going to [email or web address] or by calling [phone].

## 2025-01-15 NOTE — DISCHARGE NOTE NURSING/CASE MANAGEMENT/SOCIAL WORK - PATIENT PORTAL LINK FT
You can access the FollowMyHealth Patient Portal offered by [facility] by registering at the following website: [email or web address]. By joining [employer]’s FollowMyHealth portal, you will also be able to view your health information using other applications (apps) compatible with our system.

## 2025-01-15 NOTE — PROGRESS NOTE ADULT - SUBJECTIVE AND OBJECTIVE BOX
Patient is a 39y old  Female who presents with a chief complaint of hypoglycemia, L sided weakness    SUBJECTIVE / OVERNIGHT EVENTS:    no CP, SOB, f/c/n/v  reports would prefer not to go to [facility]  walks with walker at baseline     MEDICATIONS  (STANDING):  apixaban 5 milliGRAM(s) Oral two times a day  cloNIDine 0.1 milliGRAM(s) Oral at bedtime  diphenhydrAMINE 50 milliGRAM(s) Oral at bedtime  divalproex  milliGRAM(s) Oral two times a day  escitalopram 10 milliGRAM(s) Oral daily  levothyroxine 100 MICROGram(s) Oral daily  melatonin 3 milliGRAM(s) Oral at bedtime  naltrexone 50 milliGRAM(s) Oral daily  QUEtiapine 225 milliGRAM(s) Oral at bedtime  risperiDONE   Tablet 1 milliGRAM(s) Oral at bedtime  rosuvastatin 5 milliGRAM(s) Oral at bedtime  sucralfate 1 Gram(s) Oral two times a day    MEDICATIONS  (PRN):  acetaminophen     Tablet .. 650 milliGRAM(s) Oral every 6 hours PRN Mild Pain (1 - 3), Moderate Pain (4 - 6)  albuterol/ipratropium for Nebulization 3 milliLiter(s) Nebulizer every 6 hours PRN Shortness of Breath and/or Wheezing  aluminum hydroxide/magnesium hydroxide/simethicone Suspension 30 milliLiter(s) Oral every 4 hours PRN Dyspepsia  cyclobenzaprine 5 milliGRAM(s) Oral three times a day PRN Muscle Spasm  diphenhydrAMINE 50 milliGRAM(s) Oral every 6 hours PRN Rash and/or Itching  oxycodone    5 mG/acetaminophen 325 mG 1 Tablet(s) Oral every 6 hours PRN Severe Pain (7 - 10)    T(C): 36.5 (01-13-25 @ 14:26), Max: 36.9 (01-12-25 @ 18:00)  HR: 95 (01-13-25 @ 14:26) (91 - 107)  BP: 102/74 (01-13-25 @ 14:26) (97/71 - 125/78)  RR: 17 (01-13-25 @ 14:26) (17 - 19)  SpO2: 97% (01-13-25 @ 14:26) (94% - 98%)    CAPILLARY BLOOD GLUCOSE  POCT Blood Glucose.: 100 mg/dL (13 Jan 2025 12:04)  POCT Blood Glucose.: 131 mg/dL (13 Jan 2025 07:15)  POCT Blood Glucose.: 98 mg/dL (12 Jan 2025 22:10)  POCT Blood Glucose.: 119 mg/dL (12 Jan 2025 17:10)    I&O's Summary    12 Jan 2025 07:01  -  13 Jan 2025 07:00  --------------------------------------------------------  IN: 600 mL / OUT: 500 mL / NET: 100 mL    PHYSICAL EXAM:  GENERAL: NAD, obese    CHEST/LUNG: Clear to auscultation bilaterally; No wheeze  HEART: Regular rate and rhythm; No murmurs, rubs, or gallops  ABDOMEN: Soft, Nontender, Nondistended; Bowel sounds present  EXTREMITIES:   warm and well perfused, No clubbing, cyanosis, or edema; R dorsal hand swelling w/o warmth or skin changes  PSYCH: AAOx3   NEUROLOGY: mild L sided weakness     LABS:                        10.8   8.38  )-----------( 142      ( 13 Jan 2025 05:30 )             33.6     01-13    139  |  103  |  11  ----------------------------<  90  4.3   |  24  |  0.68    Ca    9.1      13 Jan 2025 05:30    Care Discussed with Consultants/Other Providers:  
Patient is a 39y old  Female who presents with a chief complaint of     INTERVAL HPI/OVERNIGHT EVENTS: TYSHAWN overnight. This morning, no acute complaints.       REVIEW OF SYSTEMS:    CONSTITUTIONAL: No weakness, fevers or chills  EYES/ENT: No visual changes;  No vertigo or throat pain   NECK: No pain or stiffness  RESPIRATORY: No cough, wheezing, hemoptysis; No shortness of breath  CARDIOVASCULAR: No chest pain or palpitations  GASTROINTESTINAL: No abdominal or epigastric pain. No nausea, vomiting, or hematemesis; No diarrhea or constipation. No melena or hematochezia.  GENITOURINARY: No dysuria, frequency or hematuria  NEUROLOGICAL: No numbness or weakness  All other review of systems is negative unless indicated above.    FAMILY HISTORY:  Family history of DVT (Mother)      T(C): 36.8 (01-12-25 @ 09:34), Max: 36.8 (01-12-25 @ 09:34)  HR: 86 (01-12-25 @ 09:34) (84 - 104)  BP: 93/53 (01-12-25 @ 09:34) (93/53 - 112/83)  RR: 18 (01-12-25 @ 09:34) (16 - 18)  SpO2: 98% (01-12-25 @ 09:34) (98% - 100%)  Wt(kg): --Vital Signs Last 24 Hrs  T(C): 36.8 (12 Jan 2025 09:34), Max: 36.8 (12 Jan 2025 09:34)  T(F): 98.2 (12 Jan 2025 09:34), Max: 98.2 (12 Jan 2025 09:34)  HR: 86 (12 Jan 2025 09:34) (84 - 104)  BP: 93/53 (12 Jan 2025 09:34) (93/53 - 112/83)  BP(mean): --  RR: 18 (12 Jan 2025 09:34) (16 - 18)  SpO2: 98% (12 Jan 2025 09:34) (98% - 100%)    Parameters below as of 12 Jan 2025 05:30  Patient On (Oxygen Delivery Method): room air        PHYSICAL EXAM:  HEAD:  Atraumatic, Normocephalic  EYES: EOMI, conjunctiva and sclera clear  NECK: Supple  CHEST/LUNG: Clear to auscultation bilaterally; No wheeze, rhonchi, crackles or rales  HEART: Regular rate and rhythm; No murmurs, rubs, or gallops  ABDOMEN: Soft, Nontender, Nondistended; Bowel sounds present  EXTREMITIES:  2+ Peripheral Pulses, No edema  PSYCH: AAOx2 (states its 2024)  NEUROLOGY: 4/5 strength R upper and Lower ext. 0/5 LL Ext, 2/5 LUE. Sensation in tact. Poor pt effort/participation also noted.   SKIN: Warm and dry    Consultant(s) Notes Reviewed:  [x ] YES  [ ] NO  Care Discussed with Consultants/Other Providers [ x] YES  [ ] NO    LABS:                        10.8   7.94  )-----------( 145      ( 12 Jan 2025 07:20 )             33.0     12 Jan 2025 07:20    139    |  103    |  12     ----------------------------<  81     4.5     |  25     |  0.70     Ca    9.1        12 Jan 2025 07:20        CAPILLARY BLOOD GLUCOSE      POCT Blood Glucose.: 88 mg/dL (12 Jan 2025 07:12)  POCT Blood Glucose.: 100 mg/dL (11 Jan 2025 22:39)  POCT Blood Glucose.: 105 mg/dL (11 Jan 2025 17:08)  POCT Blood Glucose.: 93 mg/dL (11 Jan 2025 11:51)    BLOOD CULTURE      RADIOLOGY & ADDITIONAL TESTS:    Imaging Personally Reviewed:  [ ] YES  [ ] NO  acetaminophen     Tablet .. 650 milliGRAM(s) Oral every 6 hours PRN  albuterol/ipratropium for Nebulization 3 milliLiter(s) Nebulizer every 6 hours PRN  aluminum hydroxide/magnesium hydroxide/simethicone Suspension 30 milliLiter(s) Oral every 4 hours PRN  apixaban 5 milliGRAM(s) Oral two times a day  cloNIDine 0.1 milliGRAM(s) Oral at bedtime  cyclobenzaprine 5 milliGRAM(s) Oral three times a day PRN  dextrose 5%. 1000 milliLiter(s) IV Continuous <Continuous>  dextrose 5%. 1000 milliLiter(s) IV Continuous <Continuous>  dextrose 50% Injectable 25 Gram(s) IV Push once  dextrose 50% Injectable 12.5 Gram(s) IV Push once  dextrose 50% Injectable 25 Gram(s) IV Push once  dextrose Oral Gel 15 Gram(s) Oral once  diphenhydrAMINE 50 milliGRAM(s) Oral every 6 hours PRN  diphenhydrAMINE 50 milliGRAM(s) Oral at bedtime  divalproex  milliGRAM(s) Oral two times a day  escitalopram 10 milliGRAM(s) Oral daily  glucagon  Injectable 1 milliGRAM(s) IntraMuscular once  levothyroxine 100 MICROGram(s) Oral daily  melatonin 3 milliGRAM(s) Oral at bedtime  naltrexone 50 milliGRAM(s) Oral daily  oxycodone    5 mG/acetaminophen 325 mG 1 Tablet(s) Oral every 6 hours PRN  QUEtiapine 225 milliGRAM(s) Oral at bedtime  risperiDONE   Tablet 1 milliGRAM(s) Oral at bedtime  rosuvastatin 5 milliGRAM(s) Oral at bedtime  sucralfate 1 Gram(s) Oral two times a day      HEALTH ISSUES - PROBLEM Dx:  Hypoglycemia    Left-sided weakness    Swelling of right hand    Factor V Leiden    Bipolar disorder    Hypothyroidism    Prophylactic measure          
Patient is a 39y old  Female who presents with a chief complaint of hypoglycemia (13 Jan 2025 15:15)    SUBJECTIVE / OVERNIGHT EVENTS:    no CP, SOB, f/c/n/v  reports feels overall better, able to walk with PT yesterday    MEDICATIONS  (STANDING):  apixaban 5 milliGRAM(s) Oral two times a day  cloNIDine 0.1 milliGRAM(s) Oral at bedtime  dextrose Oral Gel 15 Gram(s) Oral once  diphenhydrAMINE 50 milliGRAM(s) Oral at bedtime  divalproex  milliGRAM(s) Oral two times a day  escitalopram 10 milliGRAM(s) Oral daily  glucagon  Injectable 1 milliGRAM(s) IntraMuscular once  levothyroxine 100 MICROGram(s) Oral daily  melatonin 3 milliGRAM(s) Oral at bedtime  naltrexone 50 milliGRAM(s) Oral daily  QUEtiapine 225 milliGRAM(s) Oral at bedtime  risperiDONE   Tablet 1 milliGRAM(s) Oral at bedtime  rosuvastatin 5 milliGRAM(s) Oral at bedtime  sucralfate 1 Gram(s) Oral two times a day    MEDICATIONS  (PRN):  acetaminophen     Tablet .. 650 milliGRAM(s) Oral every 6 hours PRN Mild Pain (1 - 3), Moderate Pain (4 - 6)  albuterol/ipratropium for Nebulization 3 milliLiter(s) Nebulizer every 6 hours PRN Shortness of Breath and/or Wheezing  aluminum hydroxide/magnesium hydroxide/simethicone Suspension 30 milliLiter(s) Oral every 4 hours PRN Dyspepsia  cyclobenzaprine 5 milliGRAM(s) Oral three times a day PRN Muscle Spasm  diphenhydrAMINE 50 milliGRAM(s) Oral every 6 hours PRN Rash and/or Itching  oxycodone    5 mG/acetaminophen 325 mG 1 Tablet(s) Oral every 6 hours PRN Severe Pain (7 - 10)    T(C): 36.7 (01-15-25 @ 09:04), Max: 36.8 (01-14-25 @ 21:49)  HR: 98 (01-15-25 @ 09:04) (85 - 100)  BP: 113/68 (01-15-25 @ 09:04) (100/58 - 129/96)  RR: 17 (01-15-25 @ 09:04) (17 - 18)  SpO2: 100% (01-15-25 @ 09:04) (98% - 100%)    CAPILLARY BLOOD GLUCOSE  POCT Blood Glucose.: 139 mg/dL (15 James 2025 12:03)  POCT Blood Glucose.: 126 mg/dL (15 James 2025 06:36)  POCT Blood Glucose.: 109 mg/dL (14 Jan 2025 23:03)  POCT Blood Glucose.: 101 mg/dL (14 Jan 2025 16:51)      I&O's Summary    14 Jan 2025 07:01  -  15 James 2025 07:00  --------------------------------------------------------  IN: 200 mL / OUT: 0 mL / NET: 200 mL    15 James 2025 07:01  -  15 James 2025 15:23  --------------------------------------------------------  IN: 480 mL / OUT: 0 mL / NET: 480 mL    PHYSICAL EXAM:  GENERAL: NAD, obese    CHEST/LUNG: Clear to auscultation bilaterally; No wheeze  HEART: Regular rate and rhythm; No murmurs, rubs, or gallops  ABDOMEN: Soft, Nontender, Nondistended; Bowel sounds present  EXTREMITIES:   warm and well perfused, No clubbing, cyanosis, or edema; R dorsal hand swelling w/o warmth or skin changes  PSYCH: AAOx3   NEUROLOGY: mild L sided weakness of leg     LABS:                        10.8   6.95  )-----------( 124      ( 14 Jan 2025 06:00 )             34.2     01-14    137  |  102  |  9   ----------------------------<  82  4.5   |  24  |  0.67    Ca    9.1      14 Jan 2025 06:00    TPro  6.6  /  Alb  3.6  /  TBili  <0.2  /  DBili  x   /  AST  22  /  ALT  41[H]  /  AlkPhos  69  01-14    Care Discussed with Consultants/Other Providers:  
Patient is a 39y old  Female who presents with a chief complaint of     INTERVAL HPI/OVERNIGHT EVENTS: TYSHAWN overnight. This morning, states L hand weakness is improving.      REVIEW OF SYSTEMS:    CONSTITUTIONAL: No weakness, fevers or chills  EYES/ENT: No visual changes;  No vertigo or throat pain   NECK: No pain or stiffness  RESPIRATORY: No cough, wheezing, hemoptysis; No shortness of breath  CARDIOVASCULAR: No chest pain or palpitations  GASTROINTESTINAL: No abdominal or epigastric pain. No nausea, vomiting, or hematemesis; No diarrhea or constipation. No melena or hematochezia.  GENITOURINARY: No dysuria, frequency or hematuria  NEUROLOGICAL: No numbness or weakness  All other review of systems is negative unless indicated above.    FAMILY HISTORY:  Family history of DVT (Mother)      T(C): 36.7 (01-11-25 @ 09:53), Max: 37.1 (01-10-25 @ 17:18)  HR: 84 (01-11-25 @ 09:53) (84 - 98)  BP: 101/73 (01-11-25 @ 09:53) (101/73 - 120/88)  RR: 18 (01-11-25 @ 09:53) (17 - 18)  SpO2: 100% (01-11-25 @ 09:53) (100% - 100%)  Wt(kg): --Vital Signs Last 24 Hrs  T(C): 36.7 (11 Jan 2025 09:53), Max: 37.1 (10 James 2025 17:18)  T(F): 98.1 (11 Jan 2025 09:53), Max: 98.8 (10 James 2025 17:18)  HR: 84 (11 Jan 2025 09:53) (84 - 98)  BP: 101/73 (11 Jan 2025 09:53) (101/73 - 120/88)  BP(mean): --  RR: 18 (11 Jan 2025 09:53) (17 - 18)  SpO2: 100% (11 Jan 2025 09:53) (100% - 100%)    Parameters below as of 11 Jan 2025 05:37  Patient On (Oxygen Delivery Method): room air        PHYSICAL EXAM:  HEAD:  Atraumatic, Normocephalic  EYES: EOMI, conjunctiva and sclera clear  NECK: Supple  CHEST/LUNG: Clear to auscultation bilaterally; No wheeze, rhonchi, crackles or rales  HEART: Regular rate and rhythm; No murmurs, rubs, or gallops  ABDOMEN: Soft, Nontender, Nondistended; Bowel sounds present  EXTREMITIES:  2+ Peripheral Pulses, No edema  PSYCH: AAOx2 (states its 2024)  NEUROLOGY: 4/5 strength R upper and Lower ext. 0/5 LL Ext, 2/5 LUE. Sensation in tact. Poor pt effort/participation also noted.   SKIN: Warm and dry    Consultant(s) Notes Reviewed:  [x ] YES  [ ] NO  Care Discussed with Consultants/Other Providers [ x] YES  [ ] NO    LABS:                        10.1   4.95  )-----------( 157      ( 11 Jan 2025 05:49 )             32.2     11 Jan 2025 05:49    138    |  103    |  12     ----------------------------<  88     4.5     |  24     |  0.75     Ca    8.9        11 Jan 2025 05:49        CAPILLARY BLOOD GLUCOSE      POCT Blood Glucose.: 109 mg/dL (11 Jan 2025 08:15)  POCT Blood Glucose.: 89 mg/dL (11 Jan 2025 05:56)  POCT Blood Glucose.: 99 mg/dL (10 James 2025 21:34)    BLOOD CULTURE      RADIOLOGY & ADDITIONAL TESTS:    Imaging Personally Reviewed:  [ ] YES  [ ] NO  acetaminophen     Tablet .. 650 milliGRAM(s) Oral every 6 hours PRN  albuterol/ipratropium for Nebulization 3 milliLiter(s) Nebulizer every 6 hours PRN  aluminum hydroxide/magnesium hydroxide/simethicone Suspension 30 milliLiter(s) Oral every 4 hours PRN  apixaban 5 milliGRAM(s) Oral two times a day  cloNIDine 0.1 milliGRAM(s) Oral at bedtime  cyclobenzaprine 5 milliGRAM(s) Oral three times a day PRN  dextrose 5%. 1000 milliLiter(s) IV Continuous <Continuous>  dextrose 5%. 1000 milliLiter(s) IV Continuous <Continuous>  dextrose 50% Injectable 25 Gram(s) IV Push once  dextrose 50% Injectable 12.5 Gram(s) IV Push once  dextrose 50% Injectable 25 Gram(s) IV Push once  dextrose Oral Gel 15 Gram(s) Oral once  diphenhydrAMINE 50 milliGRAM(s) Oral every 6 hours PRN  diphenhydrAMINE 50 milliGRAM(s) Oral at bedtime  divalproex  milliGRAM(s) Oral two times a day  escitalopram 10 milliGRAM(s) Oral daily  glucagon  Injectable 1 milliGRAM(s) IntraMuscular once  levothyroxine 100 MICROGram(s) Oral daily  melatonin 3 milliGRAM(s) Oral at bedtime  naltrexone 50 milliGRAM(s) Oral daily  oxycodone    5 mG/acetaminophen 325 mG 1 Tablet(s) Oral every 6 hours PRN  QUEtiapine 225 milliGRAM(s) Oral at bedtime  risperiDONE   Tablet 1 milliGRAM(s) Oral at bedtime  rosuvastatin 5 milliGRAM(s) Oral at bedtime  sucralfate 1 Gram(s) Oral two times a day      HEALTH ISSUES - PROBLEM Dx:  Hypoglycemia    Left-sided weakness    Factor V Leiden    Bipolar disorder    Hypothyroidism    Prophylactic measure          
Patient is a 39y old  Female who presents with a chief complaint of hypoglycemia, L sided weakness     SUBJECTIVE / OVERNIGHT EVENTS:    no CP, SOB, f/c/n/v  reports L sided weakness is better  does not want to go to [facility], feels she can move closer to her baseline  declined repeat CTH    MEDICATIONS  (STANDING):  apixaban 5 milliGRAM(s) Oral two times a day  cloNIDine 0.1 milliGRAM(s) Oral at bedtime  diphenhydrAMINE 50 milliGRAM(s) Oral at bedtime  divalproex  milliGRAM(s) Oral two times a day  escitalopram 10 milliGRAM(s) Oral daily  glucagon  Injectable 1 milliGRAM(s) IntraMuscular once  levothyroxine 100 MICROGram(s) Oral daily  melatonin 3 milliGRAM(s) Oral at bedtime  naltrexone 50 milliGRAM(s) Oral daily  QUEtiapine 225 milliGRAM(s) Oral at bedtime  risperiDONE   Tablet 1 milliGRAM(s) Oral at bedtime  rosuvastatin 5 milliGRAM(s) Oral at bedtime  sucralfate 1 Gram(s) Oral two times a day    MEDICATIONS  (PRN):  acetaminophen     Tablet .. 650 milliGRAM(s) Oral every 6 hours PRN Mild Pain (1 - 3), Moderate Pain (4 - 6)  albuterol/ipratropium for Nebulization 3 milliLiter(s) Nebulizer every 6 hours PRN Shortness of Breath and/or Wheezing  aluminum hydroxide/magnesium hydroxide/simethicone Suspension 30 milliLiter(s) Oral every 4 hours PRN Dyspepsia  cyclobenzaprine 5 milliGRAM(s) Oral three times a day PRN Muscle Spasm  diphenhydrAMINE 50 milliGRAM(s) Oral every 6 hours PRN Rash and/or Itching  oxycodone    5 mG/acetaminophen 325 mG 1 Tablet(s) Oral every 6 hours PRN Severe Pain (7 - 10)    T(C): 36.9 (01-14-25 @ 13:47), Max: 36.9 (01-14-25 @ 13:47)  HR: 98 (01-14-25 @ 13:47) (94 - 102)  BP: 112/59 (01-14-25 @ 13:47) (100/65 - 116/81)  RR: 17 (01-14-25 @ 13:47) (17 - 18)  SpO2: 100% (01-14-25 @ 13:47) (98% - 100%)    CAPILLARY BLOOD GLUCOSE  POCT Blood Glucose.: 110 mg/dL (14 Jan 2025 11:54)  POCT Blood Glucose.: 111 mg/dL (13 Jan 2025 21:51)  POCT Blood Glucose.: 116 mg/dL (13 Jan 2025 17:09)    I&O's Summary    13 Jan 2025 07:01  -  14 Jan 2025 07:00  --------------------------------------------------------  IN: 200 mL / OUT: 500 mL / NET: -300 mL    PHYSICAL EXAM:  GENERAL: NAD, obese    CHEST/LUNG: Clear to auscultation bilaterally; No wheeze  HEART: Regular rate and rhythm; No murmurs, rubs, or gallops  ABDOMEN: Soft, Nontender, Nondistended; Bowel sounds present  EXTREMITIES:   warm and well perfused, No clubbing, cyanosis, or edema; R dorsal hand swelling w/o warmth or skin changes  PSYCH: AAOx3   NEUROLOGY: mild L sided weakness of leg not so arm    LABS:                        10.8   6.95  )-----------( 124      ( 14 Jan 2025 06:00 )             34.2     01-14    137  |  102  |  9   ----------------------------<  82  4.5   |  24  |  0.67    Ca    9.1      14 Jan 2025 06:00    TPro  6.6  /  Alb  3.6  /  TBili  <0.2  /  DBili  x   /  AST  22  /  ALT  41[H]  /  AlkPhos  69  01-14    Care Discussed with Consultants/Other Providers:

## 2025-01-15 NOTE — PROGRESS NOTE ADULT - PROBLEM SELECTOR PLAN 6
--c/w synthroid
TSH 0.67  - c/w synthroid
--c/w synthroid
TSH 0.67  - c/w synthroid
TSH 0.67  - c/w synthroid

## 2025-01-15 NOTE — PROGRESS NOTE ADULT - PROBLEM SELECTOR PROBLEM 3
Swelling of right hand

## 2025-01-15 NOTE — PROGRESS NOTE ADULT - TIME BILLING
Time spent includes direct patient care  (interview and examination of patient), discussion with other providers, support staff and/or patient's family members, review of medical records, ordering diagnostic tests and analyzing results, and documentation.
reviewing laboratory data, consultants' recommendations, documentation in [place], performing medically appropriate examinations/evaluations, discussion with patient/family/RN/ACP/Residents and interdisciplinary staff (such as , social workers, etc), counseling patient/family/care giver, ordering medically appropriate medication, tests, or procedures
Time spent includes direct patient care  (interview and examination of patient), discussion with other providers, support staff and/or patient's family members, review of medical records, ordering diagnostic tests and analyzing results, and documentation.
Time spent includes direct patient care  (interview and examination of patient), discussion with other providers, support staff and/or patient's family members, review of medical records, ordering diagnostic tests and analyzing results, and documentation.

## 2025-01-15 NOTE — PROGRESS NOTE ADULT - PROBLEM SELECTOR PLAN 1
No clear  - no recurrence since 1/9  - not on IV dextrose  - tolerating diet   - PM cortisol 2.7, am was 7.4 however drawn prior to 8am, no hypotension or other supporting sx
No clear  - no recurrence since 1/9  - not on IV dextrose  - tolerating diet   - if recurs and BG is < 55, obtain STAT BMP, Insulin level, Insulin Ab, Pro-insulin, C-Peptide, BHB, Sulfonylurea screen; however if symptomatic, would treat. Would not hold off on treatment to obtain Labs  - PM cortisol 2.7, am was 7.4 however drawn prior to 8am, no hypotension or other supporting sx
--Unclear etiology  --Started on D5/ 1/2 NS.   --Hold IVF and encourage PO intake  --If FS < 55, obtain STAT BMP, Insulin level, Insulin Ab, Pro-insulin, C-Peptide, BHB, Sulfonylurea screen  --If symptomatic, would treat. Would not hold off on treatment to obtain Labs  --Can resume D5/NS after hypoglycemic work up is obtained   --AM cortisol 2.7  --A1C 5.4%  --Endo consult pending above work up
--Unclear etiology  --Hold IVF and encourage PO intake  --If FS < 55, obtain STAT BMP, Insulin level, Insulin Ab, Pro-insulin, C-Peptide, BHB, Sulfonylurea screen  --If symptomatic, would treat. Would not hold off on treatment to obtain Labs  --Can resume D5/NS after hypoglycemic work up is obtained   --AM cortisol 2.7  --A1C 5.4%  --Endo consult pending above work up
No clear  - no recurrence since 1/9  - not on IV dextrose  - tolerating diet   - if recurs and BG is < 55, obtain STAT BMP, Insulin level, Insulin Ab, Pro-insulin, C-Peptide, BHB, Sulfonylurea screen; however if symptomatic, would treat. Would not hold off on treatment to obtain Labs  - PM cortisol 2.7, am was 7.4 however drawn prior to 8am, no hypotension or other supporting sx

## 2025-01-15 NOTE — DISCHARGE NOTE NURSING/CASE MANAGEMENT/SOCIAL WORK - NSDCPEFALRISK_GEN_ALL_CORE
For information on Fall & Injury Prevention, visit: [email or web address] OR  [email or web address] OR  https://www.cdc.gov/steadi/patient.html

## 2025-01-15 NOTE — PROGRESS NOTE ADULT - PROBLEM SELECTOR PLAN 2
--CT Head / CTA head/neck without acute findings  --Neuro consulted per ETyraD; NTD per neuro as L sided weakness unchanged from prior  --Close monitoring  --PT Eval pending
- CTH/CTA head/neck without acute findings, repeat CTH to assess stability declines  - Neuro consulted per E.D; NTD per neuro as L sided weakness unchanged from prior  - PT eval rec [facility], f/u as pt states she is now at baseline (uses walker)
--CT Head / CTA head/neck without acute findings  --Neuro consulted per E.D; NTD per neuro as L sided weakness unchanged from prior  --Close monitoring  --PT Eval: inpatient rehab  --PMR consult
- CTH/CTA head/neck without acute findings, repeat CTH to assess stability declines  - Neuro consulted per E.D; NTD per neuro as L sided weakness unchanged from prior  - PT eval rec [facility], f/u as pt states she is now at baseline (uses walker)
- CTH/CTA head/neck without acute findings, will repeat CTH to assess stability   - Neuro consulted per E.D; NTD per neuro as L sided weakness unchanged from prior  - PT eval rec [facility], f/u as pt states she is now at baseline (uses walker)

## 2025-01-17 ENCOUNTER — EMERGENCY (EMERGENCY)
Facility: HOSPITAL | Age: 40
LOS: 1 days | Discharge: HOME | End: 2025-01-17
Attending: STUDENT IN AN ORGANIZED HEALTH CARE EDUCATION/TRAINING PROGRAM | Admitting: STUDENT IN AN ORGANIZED HEALTH CARE EDUCATION/TRAINING PROGRAM
Payer: MEDICARE

## 2025-01-17 VITALS
SYSTOLIC BLOOD PRESSURE: 119 MMHG | RESPIRATION RATE: 16 BRPM | TEMPERATURE: 98 F | HEIGHT: 71 IN | HEART RATE: 103 BPM | WEIGHT: 293 LBS | DIASTOLIC BLOOD PRESSURE: 85 MMHG | OXYGEN SATURATION: 97 %

## 2025-01-17 DIAGNOSIS — Z90.49 ACQUIRED ABSENCE OF OTHER SPECIFIED PARTS OF DIGESTIVE TRACT: Chronic | ICD-10-CM

## 2025-01-17 PROCEDURE — 99284 EMERGENCY DEPT VISIT MOD MDM: CPT

## 2025-01-17 NOTE — ED ADULT TRIAGE NOTE - SOURCE OF INFORMATION
I had told pt he needs to see pulmonlogist; he has severe COPD. He has referral in chart but I dont think he has seen pulmo yet whch he needs to do .  I also switched his inhaler to trelegy inhaler. Is he using this new inhaler?  I will give him a refill for cough med but it's controlled med since has codeine so we limit it.   
Patient/EMS
1

## 2025-01-17 NOTE — ED ADULT TRIAGE NOTE - CHIEF COMPLAINT QUOTE
c/o weakness bilateral LE and RUQ abdominal pain w/ n/v x3 days. pt endorsees SOB worsening with ambulation. Denies fevers, chest pain, falls, headache. HX of DM, HTN, GERD, DM, Hypothyroid, Bipolar, seizure, asthma. c/o weakness bilateral LE and RUQ abdominal pain w/ n/v x3 days. Denies fevers, chest pain, SOB falls, headache. HX of DM, HTN, GERD, DM, Hypothyroid, Bipolar, seizure, asthma.

## 2025-01-18 ENCOUNTER — EMERGENCY (EMERGENCY)
Facility: HOSPITAL | Age: 40
LOS: 0 days | Discharge: HOME | End: 2025-01-18
Attending: STUDENT IN AN ORGANIZED HEALTH CARE EDUCATION/TRAINING PROGRAM
Payer: MEDICARE

## 2025-01-18 VITALS
WEIGHT: 274.92 LBS | HEART RATE: 103 BPM | HEIGHT: 71 IN | SYSTOLIC BLOOD PRESSURE: 105 MMHG | OXYGEN SATURATION: 97 % | RESPIRATION RATE: 16 BRPM | TEMPERATURE: 98 F | DIASTOLIC BLOOD PRESSURE: 70 MMHG

## 2025-01-18 VITALS
OXYGEN SATURATION: 98 % | SYSTOLIC BLOOD PRESSURE: 120 MMHG | DIASTOLIC BLOOD PRESSURE: 87 MMHG | RESPIRATION RATE: 18 BRPM | TEMPERATURE: 98 F | HEART RATE: 75 BPM

## 2025-01-18 VITALS
HEART RATE: 84 BPM | DIASTOLIC BLOOD PRESSURE: 78 MMHG | SYSTOLIC BLOOD PRESSURE: 123 MMHG | RESPIRATION RATE: 18 BRPM | OXYGEN SATURATION: 100 % | TEMPERATURE: 98 F

## 2025-01-18 DIAGNOSIS — E11.9 TYPE 2 DIABETES MELLITUS WITHOUT COMPLICATIONS: ICD-10-CM

## 2025-01-18 DIAGNOSIS — D68.51 ACTIVATED PROTEIN C RESISTANCE: ICD-10-CM

## 2025-01-18 DIAGNOSIS — Z88.0 ALLERGY STATUS TO PENICILLIN: ICD-10-CM

## 2025-01-18 DIAGNOSIS — J45.909 UNSPECIFIED ASTHMA, UNCOMPLICATED: ICD-10-CM

## 2025-01-18 DIAGNOSIS — I10 ESSENTIAL (PRIMARY) HYPERTENSION: ICD-10-CM

## 2025-01-18 DIAGNOSIS — Z88.6 ALLERGY STATUS TO ANALGESIC AGENT: ICD-10-CM

## 2025-01-18 DIAGNOSIS — Z86.711 PERSONAL HISTORY OF PULMONARY EMBOLISM: ICD-10-CM

## 2025-01-18 DIAGNOSIS — N39.0 URINARY TRACT INFECTION, SITE NOT SPECIFIED: ICD-10-CM

## 2025-01-18 DIAGNOSIS — Z90.49 ACQUIRED ABSENCE OF OTHER SPECIFIED PARTS OF DIGESTIVE TRACT: Chronic | ICD-10-CM

## 2025-01-18 DIAGNOSIS — Z88.8 ALLERGY STATUS TO OTHER DRUGS, MEDICAMENTS AND BIOLOGICAL SUBSTANCES: ICD-10-CM

## 2025-01-18 DIAGNOSIS — E66.9 OBESITY, UNSPECIFIED: ICD-10-CM

## 2025-01-18 DIAGNOSIS — Z88.5 ALLERGY STATUS TO NARCOTIC AGENT: ICD-10-CM

## 2025-01-18 DIAGNOSIS — Z91.040 LATEX ALLERGY STATUS: ICD-10-CM

## 2025-01-18 DIAGNOSIS — R35.0 FREQUENCY OF MICTURITION: ICD-10-CM

## 2025-01-18 DIAGNOSIS — R30.0 DYSURIA: ICD-10-CM

## 2025-01-18 DIAGNOSIS — F25.9 SCHIZOAFFECTIVE DISORDER, UNSPECIFIED: ICD-10-CM

## 2025-01-18 LAB
ALBUMIN SERPL ELPH-MCNC: 4.1 G/DL — SIGNIFICANT CHANGE UP (ref 3.3–5)
ALP SERPL-CCNC: 62 U/L — SIGNIFICANT CHANGE UP (ref 40–120)
ALT FLD-CCNC: 30 U/L — SIGNIFICANT CHANGE UP (ref 4–33)
ANION GAP SERPL CALC-SCNC: 17 MMOL/L — HIGH (ref 7–14)
APPEARANCE UR: ABNORMAL
AST SERPL-CCNC: 22 U/L — SIGNIFICANT CHANGE UP (ref 4–32)
BACTERIA # UR AUTO: ABNORMAL /HPF
BASOPHILS # BLD AUTO: 0.02 K/UL — SIGNIFICANT CHANGE UP (ref 0–0.2)
BASOPHILS NFR BLD AUTO: 0.3 % — SIGNIFICANT CHANGE UP (ref 0–2)
BILIRUB SERPL-MCNC: 0.3 MG/DL — SIGNIFICANT CHANGE UP (ref 0.2–1.2)
BILIRUB UR-MCNC: ABNORMAL
BUN SERPL-MCNC: 12 MG/DL — SIGNIFICANT CHANGE UP (ref 7–23)
CALCIUM SERPL-MCNC: 8.8 MG/DL — SIGNIFICANT CHANGE UP (ref 8.4–10.5)
CHLORIDE SERPL-SCNC: 103 MMOL/L — SIGNIFICANT CHANGE UP (ref 98–107)
CO2 SERPL-SCNC: 21 MMOL/L — LOW (ref 22–31)
COLOR SPEC: SIGNIFICANT CHANGE UP
CREAT SERPL-MCNC: 0.83 MG/DL — SIGNIFICANT CHANGE UP (ref 0.5–1.3)
DIFF PNL FLD: NEGATIVE — SIGNIFICANT CHANGE UP
EGFR: 93 ML/MIN/1.73M2 — SIGNIFICANT CHANGE UP
EOSINOPHIL # BLD AUTO: 0.03 K/UL — SIGNIFICANT CHANGE UP (ref 0–0.5)
EOSINOPHIL NFR BLD AUTO: 0.5 % — SIGNIFICANT CHANGE UP (ref 0–6)
EPI CELLS # UR: PRESENT
FLUAV AG NPH QL: SIGNIFICANT CHANGE UP
FLUBV AG NPH QL: SIGNIFICANT CHANGE UP
GLUCOSE SERPL-MCNC: 79 MG/DL — SIGNIFICANT CHANGE UP (ref 70–99)
GLUCOSE UR QL: NEGATIVE MG/DL — SIGNIFICANT CHANGE UP
HCG SERPL-ACNC: <1 MIU/ML — SIGNIFICANT CHANGE UP
HCT VFR BLD CALC: 32.9 % — LOW (ref 34.5–45)
HGB BLD-MCNC: 10.5 G/DL — LOW (ref 11.5–15.5)
IANC: 3.51 K/UL — SIGNIFICANT CHANGE UP (ref 1.8–7.4)
IMM GRANULOCYTES NFR BLD AUTO: 1.1 % — HIGH (ref 0–0.9)
KETONES UR-MCNC: 15 MG/DL
LEUKOCYTE ESTERASE UR-ACNC: NEGATIVE — SIGNIFICANT CHANGE UP
LYMPHOCYTES # BLD AUTO: 1.91 K/UL — SIGNIFICANT CHANGE UP (ref 1–3.3)
LYMPHOCYTES # BLD AUTO: 31.1 % — SIGNIFICANT CHANGE UP (ref 13–44)
MAGNESIUM SERPL-MCNC: 2 MG/DL — SIGNIFICANT CHANGE UP (ref 1.6–2.6)
MCHC RBC-ENTMCNC: 30.5 PG — SIGNIFICANT CHANGE UP (ref 27–34)
MCHC RBC-ENTMCNC: 31.9 G/DL — LOW (ref 32–36)
MCV RBC AUTO: 95.6 FL — SIGNIFICANT CHANGE UP (ref 80–100)
MONOCYTES # BLD AUTO: 0.6 K/UL — SIGNIFICANT CHANGE UP (ref 0–0.9)
MONOCYTES NFR BLD AUTO: 9.8 % — SIGNIFICANT CHANGE UP (ref 2–14)
NEUTROPHILS # BLD AUTO: 3.51 K/UL — SIGNIFICANT CHANGE UP (ref 1.8–7.4)
NEUTROPHILS NFR BLD AUTO: 57.2 % — SIGNIFICANT CHANGE UP (ref 43–77)
NITRITE UR-MCNC: NEGATIVE — SIGNIFICANT CHANGE UP
NRBC # BLD: 0 /100 WBCS — SIGNIFICANT CHANGE UP (ref 0–0)
NRBC # FLD: 0 K/UL — SIGNIFICANT CHANGE UP (ref 0–0)
PH UR: 5.5 — SIGNIFICANT CHANGE UP (ref 5–8)
PHOSPHATE SERPL-MCNC: 4.8 MG/DL — HIGH (ref 2.5–4.5)
PLATELET # BLD AUTO: 105 K/UL — LOW (ref 150–400)
POTASSIUM SERPL-MCNC: 4.2 MMOL/L — SIGNIFICANT CHANGE UP (ref 3.5–5.3)
POTASSIUM SERPL-SCNC: 4.2 MMOL/L — SIGNIFICANT CHANGE UP (ref 3.5–5.3)
PROT SERPL-MCNC: 7.2 G/DL — SIGNIFICANT CHANGE UP (ref 6–8.3)
PROT UR-MCNC: 100 MG/DL
RBC # BLD: 3.44 M/UL — LOW (ref 3.8–5.2)
RBC # FLD: 15.9 % — HIGH (ref 10.3–14.5)
RBC CASTS # UR COMP ASSIST: 0 /HPF — SIGNIFICANT CHANGE UP (ref 0–4)
RSV RNA NPH QL NAA+NON-PROBE: SIGNIFICANT CHANGE UP
SARS-COV-2 RNA SPEC QL NAA+PROBE: SIGNIFICANT CHANGE UP
SODIUM SERPL-SCNC: 141 MMOL/L — SIGNIFICANT CHANGE UP (ref 135–145)
SP GR SPEC: >1.03 — HIGH (ref 1–1.03)
UROBILINOGEN FLD QL: 1 MG/DL — SIGNIFICANT CHANGE UP (ref 0.2–1)
WBC # BLD: 6.14 K/UL — SIGNIFICANT CHANGE UP (ref 3.8–10.5)
WBC # FLD AUTO: 6.14 K/UL — SIGNIFICANT CHANGE UP (ref 3.8–10.5)
WBC UR QL: 1 /HPF — SIGNIFICANT CHANGE UP (ref 0–5)

## 2025-01-18 PROCEDURE — 93970 EXTREMITY STUDY: CPT | Mod: 26

## 2025-01-18 PROCEDURE — 99284 EMERGENCY DEPT VISIT MOD MDM: CPT

## 2025-01-18 RX ORDER — SULFAMETHOXAZOLE/TRIMETHOPRIM 800-160 MG
1 TABLET ORAL ONCE
Refills: 0 | Status: COMPLETED | OUTPATIENT
Start: 2025-01-18 | End: 2025-01-18

## 2025-01-18 RX ORDER — DIPHENHYDRAMINE HCL 12.5MG/5ML
50 ELIXIR ORAL ONCE
Refills: 0 | Status: DISCONTINUED | OUTPATIENT
Start: 2025-01-18 | End: 2025-01-18

## 2025-01-18 RX ORDER — SULFAMETHOXAZOLE/TRIMETHOPRIM 800-160 MG
1 TABLET ORAL
Qty: 14 | Refills: 0
Start: 2025-01-18 | End: 2025-01-24

## 2025-01-18 RX ORDER — DIPHENHYDRAMINE HCL 25 MG
50 TABLET ORAL ONCE
Refills: 0 | Status: COMPLETED | OUTPATIENT
Start: 2025-01-18 | End: 2025-01-18

## 2025-01-18 RX ORDER — DIPHENHYDRAMINE HCL 12.5MG/5ML
50 ELIXIR ORAL ONCE
Refills: 0 | Status: COMPLETED | OUTPATIENT
Start: 2025-01-18 | End: 2025-01-18

## 2025-01-18 RX ADMIN — Medication 50 MILLIGRAM(S): at 06:52

## 2025-01-18 RX ADMIN — Medication 50 MILLIGRAM(S): at 17:58

## 2025-01-18 RX ADMIN — Medication 1 TABLET(S): at 16:59

## 2025-01-18 NOTE — ED ADULT NURSE NOTE - OBJECTIVE STATEMENT
Pt received to spot 3B, aox4 ambulatory. Pt is a 39 year old female c/o RLE and LLE weakness x3 days. Pt endorses 7/10 pain on lower extremities, states weakness is making it difficult to walk at this time. No swelling, redness or edema noted on lower extremities; skin warm dry and intact. Hx of HTN, GERD, Bipolar disorder, seizure disorder, asthma. Vitals as documented. Pt resting comfortably in bed, breathing even and unlabored. Denies chest pain, SOB, dizziness, n/v/d. Unable to establish PIV, MD Baird aware USIV needed. No acute distress noted. Awaiting US at this time.

## 2025-01-18 NOTE — PROVIDER CONTACT NOTE (OTHER) - ASSESSMENT
TAXI. [employer] taxi arranged for pt discharge with [facility] #[phone] with invoice #[id number]. Pt to be informed of taxi arrival. Verbal huddle occurred with interdisciplinary team.

## 2025-01-18 NOTE — ED PROVIDER NOTE - PATIENT PORTAL LINK FT
You can access the FollowMyHealth Patient Portal offered by [facility] by registering at the following website: [email or web address]. By joining [employer]’s FollowMyHealth portal, you will also be able to view your health information using other applications (apps) compatible with our system.

## 2025-01-18 NOTE — ED ADULT NURSE NOTE - CHIEF COMPLAINT QUOTE
c/o weakness bilateral LE and RUQ abdominal pain w/ n/v x3 days. Denies fevers, chest pain, SOB falls, headache. HX of DM, HTN, GERD, DM, Hypothyroid, Bipolar, seizure, asthma.
Yes

## 2025-01-18 NOTE — ED PROVIDER NOTE - PROGRESS NOTE DETAILS
Sade Baird, PGY-3 DO:  Patient re-evaluated at bedside and is now currently feeling better after treatment. Patient she feels better ebiugh to go home. Patient labs and US imaging non actionable. Will DC patient,

## 2025-01-18 NOTE — PROVIDER CONTACT NOTE (OTHER) - SITUATION
Writer informed pt cleared for discharge. Pt chart reviewed. Pt arrives from [facility] #[phone] as per chart. VM left at [phone] with no answer.

## 2025-01-18 NOTE — PROVIDER CONTACT NOTE (OTHER) - BACKGROUND
Writer about to speak wit staff member, Brenna, at [phone]. Brenna informed of pt discharge with no concerns reported. Chart address confirmed. Pt reported to be able to travel INDEPENDENTLY via Writer able to speak wit staff member, Brenna, at [phone]. Brenna informed of pt discharge with no concerns reported. Chart address confirmed. Pt reported to be able to travel INDEPENDENTLY via

## 2025-01-18 NOTE — ED PROVIDER NOTE - NSFOLLOWUPINSTRUCTIONS_ED_ALL_ED_FT
Thank you for visiting our emergency department today!  Today you were seen in the emergency department for left leg numbness and heaviness.    You had an ultrasound done of this leg to evaluate for clot.  There was no blood clot appreciated in your left leg.      You should follow-up with your primary care doctor for your symptoms.  Please return to the emergency department if you began having confusion, slurred speech, weakness of any of your upper or lower extremities.    Please continue to take all your medication, and rest.

## 2025-01-18 NOTE — ED PROVIDER NOTE - ATTENDING CONTRIBUTION TO CARE
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  Patient also endorsing numbness to left lower extremity.  Patient reports does have the symptoms from time to time, however usually does not persist for this long. Patient uses walker baseline, and still able to ambulate using this now. Of note, patient was just recently admitted (1/10-1/15) for hypoglycemia as well as left lower extremity weakness.  During this admission had negative CT head/CTA, neuro states NTD since symptoms are unchanged from prior. PT/OT seen at this time and recommended LOY, however patient declined at that time. Patient also declined Duplex US at that time. Pt currently denying any other symptoms.     VSS. Physical exam significant for 3/5 LLE weakness (similar to documented prior). Bilaterally sensation intact. Rest of exam is unremarkable and baseline.    DDx includes acute on chronic exacerbation vs. worsening of known residual weakness. Recent CTs/neuro work up did not yeild acute process. Patient likely will require outpatient MRI for worsening symptoms vs. LOY for continued PT/OT. Will obtain basic labs to assess for metabolic etiology. Will also send for Duplex US to assess for DVT. If work up is unremarkable will have discussion for whether patient wants LOY or would like to continue follow up outpatient.

## 2025-01-18 NOTE — ED PROVIDER NOTE - CLINICAL SUMMARY MEDICAL DECISION MAKING FREE TEXT BOX
This is a 39-year-old female, history of seizure disorder, bipolar disorder, factor V Leyden, presenting for weakness of the left lower extremity.  The patient states that her symptoms have been going on for the past 3 days.  Per chart review the patient has been seen previously this month for similar symptoms.  The patient states that she is having weakness in the left lower extremity and is having difficulty walking secondary to this weakness.  The patient also states that she is having numbness of this left lower extremity as well.  The patient otherwise denies fevers, chills, chest pain, shortness of breath.  The patient denies any pain in the lower extremity.  The patient states that the symptoms persist throughout the day.  VSS.  PE.  No tenderness to palpation of the lower extremity.  Bilateral legs symmetrical, no calf tenderness, no sensory deficits on neurological examination.  Patient able to ambulate but leaning to the left.  Per chart review on 1/9 patient had CT head, DVT, CTA head and neck, and findings were unremarkable.  Differential includes but is not limited to paresthesias, herniated disc, low suspicion for CVA at this time.  Given recent CT imaging and change in symptoms.  Will obtain basic labs and ultrasound duplex of the lower extremities, final disposition pending labs imaging and reassessment.

## 2025-01-19 NOTE — ED ADULT NURSE NOTE - TEMPLATE
Triage and Transition Services- Patient Follow Up Call  Service Line Making Phone Call: Extended Care    Who did Writer Talk to: Patient    Details of Call: Spoke with PT about follow-up care. Confirmed scheduled appts. Offered additional appts/resources and patient declined. No further follow-up needed. [facility] number provided if they would like additional assistance.     Lynda Templeton 1/19/2025 2:47 PM   
General

## 2025-01-20 ENCOUNTER — EMERGENCY (EMERGENCY)
Facility: HOSPITAL | Age: 40
LOS: 0 days | Discharge: HOME | End: 2025-01-20
Attending: EMERGENCY MEDICINE
Payer: MEDICARE

## 2025-01-20 ENCOUNTER — NON-APPOINTMENT (OUTPATIENT)
Age: 40
End: 2025-01-20

## 2025-01-20 VITALS
SYSTOLIC BLOOD PRESSURE: 115 MMHG | TEMPERATURE: 98 F | OXYGEN SATURATION: 99 % | RESPIRATION RATE: 19 BRPM | HEART RATE: 104 BPM | DIASTOLIC BLOOD PRESSURE: 81 MMHG

## 2025-01-20 VITALS
TEMPERATURE: 98 F | DIASTOLIC BLOOD PRESSURE: 83 MMHG | OXYGEN SATURATION: 97 % | SYSTOLIC BLOOD PRESSURE: 117 MMHG | WEIGHT: 293 LBS | HEIGHT: 71 IN | HEART RATE: 99 BPM | RESPIRATION RATE: 18 BRPM

## 2025-01-20 DIAGNOSIS — R30.0 DYSURIA: ICD-10-CM

## 2025-01-20 DIAGNOSIS — R52 PAIN, UNSPECIFIED: ICD-10-CM

## 2025-01-20 DIAGNOSIS — Z90.49 ACQUIRED ABSENCE OF OTHER SPECIFIED PARTS OF DIGESTIVE TRACT: Chronic | ICD-10-CM

## 2025-01-20 DIAGNOSIS — Z88.8 ALLERGY STATUS TO OTHER DRUGS, MEDICAMENTS AND BIOLOGICAL SUBSTANCES: ICD-10-CM

## 2025-01-20 DIAGNOSIS — Z88.5 ALLERGY STATUS TO NARCOTIC AGENT: ICD-10-CM

## 2025-01-20 DIAGNOSIS — N39.0 URINARY TRACT INFECTION, SITE NOT SPECIFIED: ICD-10-CM

## 2025-01-20 DIAGNOSIS — Z88.0 ALLERGY STATUS TO PENICILLIN: ICD-10-CM

## 2025-01-20 DIAGNOSIS — Z91.040 LATEX ALLERGY STATUS: ICD-10-CM

## 2025-01-20 PROCEDURE — 99283 EMERGENCY DEPT VISIT LOW MDM: CPT

## 2025-01-20 RX ORDER — ACETAMINOPHEN 80 MG/.8ML
650 SOLUTION/ DROPS ORAL ONCE
Refills: 0 | Status: COMPLETED | OUTPATIENT
Start: 2025-01-20 | End: 2025-01-20

## 2025-01-20 RX ORDER — SULFAMETHOXAZOLE/TRIMETHOPRIM 800-160 MG
1 TABLET ORAL
Qty: 14 | Refills: 0
Start: 2025-01-20 | End: 2025-01-26

## 2025-01-20 RX ORDER — SULFAMETHOXAZOLE/TRIMETHOPRIM 800-160 MG
1 TABLET ORAL ONCE
Refills: 0 | Status: COMPLETED | OUTPATIENT
Start: 2025-01-20 | End: 2025-01-20

## 2025-01-20 RX ADMIN — Medication 1 TABLET(S): at 18:34

## 2025-01-20 NOTE — ED PROVIDER NOTE - NSFOLLOWUPINSTRUCTIONS_ED_ALL_ED_FT
please drink plenty of fluid and returned if symptoms persist or worsen or unable to tolerate fluid orally

## 2025-01-20 NOTE — ED ADULT NURSE REASSESSMENT NOTE - NS ED NURSE REASSESS COMMENT FT1
spoke with Billy FROM [facility] .made aware patient is discharge and awaiting [facility] services

## 2025-01-20 NOTE — ED ADULT NURSE NOTE - NS_NURSE_DISC_TEACHING_YN_ED_ALL_ED
Pt AOx4, ambulatory, c/o chest pressure and generalized body numbness since 0630 today. Pt denies CP, SOB, dizziness. No prior cardiac history. EKG done, pt placed on cardiac monitor, no signs of distress noted.
Yes

## 2025-01-20 NOTE — ED PROVIDER NOTE - PATIENT PORTAL LINK FT
You can access the FollowMyHealth Patient Portal offered by [facility] by registering at the following website: [email or web address]. By joining [employer]s FollowMyHealth portal, you will also be able to view your health information using other applications (apps) compatible with our system.

## 2025-01-20 NOTE — ED PROVIDER NOTE - CLINICAL SUMMARY MEDICAL DECISION MAKING FREE TEXT BOX
39 years old female from a group home sts she was here and diagnosed uti two days pt was discharged but no antibiotic prescription in the pharmacy Pt  c/o burning urination and increases frequency and body ache for two days. Pt also request oxycodone prescription for body aches . Pt is alert and oriented x 3 denies headache, dizziness, blurred visions, light sensitivities, focal/distal weakness or numbness, neck/back/hips/calfs pain, cough, sob, chest pain, difficulty to walk or keeping balance, nausea, vomiting fever, chills, abd pain. vaginal spotting or discharge or irregular bowel movements. Pt appears very comfortable pt is ambulating with normal gaits without assists.

## 2025-01-20 NOTE — ED ADULT NURSE REASSESSMENT NOTE - NS ED NURSE REASSESS COMMENT FT1
right hand swollen noted MD Mazariegos made aware +  radial pulses patient states right hand been swollen for 1month no orders at this time patient waiting for transport

## 2025-01-20 NOTE — ED ADULT TRIAGE NOTE - CHIEF COMPLAINT QUOTE
BIBEMS c/o body pain and aches since last week. EMS reports here Saturday and found bladder infection, states she was told of prescription at pharmacy but there was none. Also requesting oxycodone prescription refill.

## 2025-01-20 NOTE — ED ADULT NURSE NOTE - OBJECTIVE STATEMENT
A&OX4 Patient C/O  burning with urination increase frequency of urine Dx with UTI Saturday patient states ABx's wasn't sent to her pharmacy  C/O body aches / chills Pmh   GERD/ Colitis /IBS /HTN /seizure  / Asthma /factor

## 2025-01-24 NOTE — ED ADULT TRIAGE NOTE - ARRIVAL FROM
Hpi Title: Evaluation of Skin Lesions
Hpi Title: Evaluation of a Skin Lesion
How Severe Are Your Spot(S)?: mild
Have Your Spot(S) Been Treated In The Past?: has not been treated
Home

## 2025-01-26 NOTE — ED ADULT NURSE NOTE - PAIN: BODY LOCATION
constipation. Negative for abdominal pain, diarrhea and vomiting.   Musculoskeletal:  Negative for back pain, gait problem and myalgias.   Neurological:  Positive for weakness. Negative for seizures, facial asymmetry and light-headedness.   Psychiatric/Behavioral:  Negative for confusion.          PAST MEDICAL HISTORY     Past Medical History:   Diagnosis Date    Acid reflux     Asthma     Cerebrovascular disease     Chronic cough     COPD (chronic obstructive pulmonary disease) ([facility])     Dysphagia     Dyspnea     Unspecified cerebral artery occlusion with cerebral infarction 11/05/2008    Wheelchair dependent        PAST SURGICAL HISTORY     Past Surgical History:   Procedure Laterality Date    BRAIN SURGERY      CRANIOTOMY      GASTROSTOMY TUBE PLACEMENT      removed    KNEE SURGERY         ALLERGIES     Pcn [penicillins], Morphine, and Sulfa antibiotics    MEDICATIONS PRIOR TO ADMISSION     Prior to Admission medications    Medication Sig Start Date End Date Taking? Authorizing Provider   sertraline (ZOLOFT) 100 MG tablet Take 2 tablets by mouth daily 1/8/25   Adrian Adkins APRN - CNP   omeprazole (PRILOSEC) 40 MG delayed release capsule TAKE 1 CAPSULE BY MOUTH DAILY 12/18/24   Adrian Adkins APRN - CNP   folic acid (FOLVITE) 1 MG tablet TAKE 1 TABLET BY MOUTH DAILY 12/18/24   Adrian Adkins APRN - CNP   levETIRAcetam (KEPPRA) 500 MG tablet TAKE 1/2 TABLET BY MOUTH TWICE A DAY 12/18/24   Adrian Adkins APRN - CNP   pioglitazone (ACTOS) 30 MG tablet  12/18/24   Provider, MD Amanda   baclofen (LIORESAL) 10 MG tablet TAKE 1 TABLET BY MOUTH 2 TIMES A DAY 11/20/24   Adrian Adkins APRN - CNP   budesonide (PULMICORT) 0.5 MG/2ML nebulizer suspension USE ONE VIAL VIA NEBULIZER MACHINE TWO TIMES DAILY 8/29/24 8/30/25  Kostas Brunson MD   ipratropium 0.5 mg-albuterol 2.5 mg (DUONEB) 0.5-2.5 (3) MG/3ML SOLN nebulizer solution INHALE THREE MILLILITERS VIA NEBULIZER BY MOUTH EVERY 6 HOURS 8/29/24   Kostas Brunson  MD   gabapentin (NEURONTIN) 300 MG capsule TAKE 1 CAPSULE BY MOUTH TWICE A DAY 8/14/24 2/10/25  Adrian Adkins APRN - CNP   Lancets MISC Test one times a day & as needed for symptoms of irregular blood glucose. DX: E11.41 Dispense brand covered by insurance. Note to Pharmacy: Brand per patient preference. May round up to next available package size. 7/30/24   Adrian Adkins APRN - CNP   blood glucose test strips (TRUE METRIX BLOOD GLUCOSE TEST) strip Test one times a day & as needed for symptoms of irregular blood glucose. DX: E11.41 Dispense brand covered by insurance. Note to Pharmacy: Brand per patient preference. May round up to next available package size. 7/30/24   Adrian Adkins APRN - CNP   furosemide (LASIX) 20 MG tablet Take 1 tablet by mouth every other day 7/17/24   Courtney Sanchez APRN - CNP   atorvastatin (LIPITOR) 40 MG tablet Take 1 tablet by mouth daily 2/6/24   Adrian Adkins APRN - CNP   cetirizine (ZYRTEC) 10 MG tablet TAKE ONE TABLET BY MOUTH DAILY 11/30/22   Adrian Adkins APRN - CNP   Misc. Devices (WHEELCHAIR) MISC 1 each by Does not apply route daily WITH ELEVATING LEG RESTS 7/18/22   Adrian Adkins APRN - CNP   Multiple Vitamins-Minerals ([employer] SPECTRAVITE ADVANCED) TABS TAKE 1 TABLET BY MOUTH EVERY DAY 10/27/21   Adrian Adkins APRN - CNP   fluticasone (FLONASE) 50 MCG/ACT nasal spray INSTILL 2 SPRAYS INTO EACH NOSTRIL EVERY DAY 9/15/21   Riki Perkins MD   Misc. Devices (ADJUST BATH/SHOWER SEAT/BACK) MISC 1 each by Does not apply route daily 10/17/19   Adrian Adkins APRN - CNP   Misc. Devices (COMMODE BEDSIDE) MISC 1 each by Does not apply route daily 10/17/19   Adrian Adkins APRN - CNP   Misc. Devices (NOVA CUSHION GEL SEAT PAD) MISC 1 each by Does not apply route daily 3/28/19   Adrian Adkins APRN - CNP   blood glucose monitor kit and supplies 1 kit by Other route daily Test 1 times a day & as needed for symptoms of irregular blood glucose. 8/6/18   Stump,  sips of water with meds  -Patient is on LR at 75 mL/h  -Consider tube feeding    CKD stage IIIb:  -BUNs/creatinine 22/2.1(baseline 2)  -Monitor I's and O's  -Monitor BMP  -Currently on LR at 75 mL/h      DVT ppx: Heparin 5000 units every 8 hours    Diet: Diet NPO Exceptions are: Sips of Water with Meds     PT/OT/SW : Consulted    Discharge Planning : When medically stable    Consults: None    Coreen Man MD  Internal Medicine Resident, PGY-1  [facility]; [place], [place]  1/25/2025, 8:38 PM        Attestation and add on       I have discussed the care of Talon Walker Jr. , including pertinent history and exam findings,    1/25/25    with the resident.  I have seen and examined the patient and the key elements of all parts of the encounter have been performed by me .   I agree with the assessment, plan and orders as documented by the resident.     Hospital Problems             Last Modified POA    * (Principal) Acute respiratory failure with hypercapnia 1/23/2025 Yes    Acute cerebrovascular accident (HCC) 1/23/2025 Yes    Abnormal EEG 1/23/2025 Yes           --Patient has underlying COPD admitted with loss of consciousness stroke suspected also concern for aspiration pneumonia-- ;        Medications:     Allergies:    Allergies   Allergen Reactions    Pcn [Penicillins] Anaphylaxis    Morphine Rash    Sulfa Antibiotics Rash       Current Meds:   Scheduled Meds:    metroNIDAZOLE  500 mg IntraVENous Q8H    ipratropium 0.5 mg-albuterol 2.5 mg  1 Dose Inhalation Q4H WA RT    atorvastatin  40 mg Orogastric Daily    budesonide  0.5 mg Nebulization BID RT    sodium chloride flush  5-40 mL IntraVENous 2 times per day    pantoprazole (PROTONIX) 40 mg in sodium chloride (PF) 0.9 % 10 mL injection  40 mg IntraVENous Daily    insulin lispro  0-4 Units SubCUTAneous Q6H    cefepime  2,000 mg IntraVENous Q12H    heparin (porcine)  5,000 Units SubCUTAneous 3 times per day    aspirin  81 mg Orogastric Daily  head

## 2025-01-28 ENCOUNTER — APPOINTMENT (OUTPATIENT)
Dept: INTERNAL MEDICINE | Facility: CLINIC | Age: 40
End: 2025-01-28

## 2025-01-30 ENCOUNTER — NON-APPOINTMENT (OUTPATIENT)
Age: 40
End: 2025-01-30

## 2025-01-30 ENCOUNTER — EMERGENCY (EMERGENCY)
Facility: HOSPITAL | Age: 40
LOS: 1 days | Discharge: HOME | End: 2025-01-30
Admitting: EMERGENCY MEDICINE
Payer: MEDICARE

## 2025-01-30 VITALS
DIASTOLIC BLOOD PRESSURE: 83 MMHG | HEIGHT: 71 IN | TEMPERATURE: 97 F | OXYGEN SATURATION: 100 % | RESPIRATION RATE: 100 BRPM | SYSTOLIC BLOOD PRESSURE: 130 MMHG | HEART RATE: 98 BPM

## 2025-01-30 DIAGNOSIS — Z90.49 ACQUIRED ABSENCE OF OTHER SPECIFIED PARTS OF DIGESTIVE TRACT: Chronic | ICD-10-CM

## 2025-01-30 PROCEDURE — 99285 EMERGENCY DEPT VISIT HI MDM: CPT

## 2025-01-30 RX ORDER — DIPHENHYDRAMINE HCL 25 MG
25 CAPSULE ORAL ONCE
Refills: 0 | Status: COMPLETED | OUTPATIENT
Start: 2025-01-30 | End: 2025-01-30

## 2025-01-30 RX ORDER — HALOPERIDOL 10 MG/1
5 TABLET ORAL ONCE
Refills: 0 | Status: DISCONTINUED | OUTPATIENT
Start: 2025-01-30 | End: 2025-01-30

## 2025-01-30 RX ORDER — HALOPERIDOL 10 MG/1
5 TABLET ORAL ONCE
Refills: 0 | Status: COMPLETED | OUTPATIENT
Start: 2025-01-30 | End: 2025-01-30

## 2025-01-30 RX ORDER — DIPHENHYDRAMINE HCL 25 MG
25 CAPSULE ORAL ONCE
Refills: 0 | Status: DISCONTINUED | OUTPATIENT
Start: 2025-01-30 | End: 2025-01-30

## 2025-01-30 RX ADMIN — Medication 25 MILLIGRAM(S): at 23:42

## 2025-01-30 RX ADMIN — HALOPERIDOL 5 MILLIGRAM(S): 10 TABLET ORAL at 23:42

## 2025-01-30 NOTE — ED PROVIDER NOTE - CLINICAL SUMMARY MEDICAL DECISION MAKING FREE TEXT BOX
A 39-year-old female patient with a past medical history of factor V disorder, endometriosis, and diabetes, along with a past psychiatric history of intellectual disability, bipolar disorder, and schizoaffective disorder, presents with increased anxiety. She reports feeling bullied by her roommate, who calls her names, causing her significant distress. She also reports experiencing sudden, intermittent suicidal ideation, but denies any intent or plan to harm herself. The patient states that PRN medication typically alleviates her symptoms. At present, she explicitly denies any suicidal or homicidal ideation, auditory or visual hallucinations, or physical complaints.  Collateral info by sw, refer to the note.   PRN med for anxiety -

## 2025-01-30 NOTE — ED BEHAVIORAL HEALTH NOTE - BEHAVIORAL HEALTH NOTE
Pt arrives from [facility]. Writer contacted [facility]  at [phone]. Writer received VM with message left and a return call requested. Writer then contacted pt's residence directly at [phone]. [facility] home agency is [facility]. Writer spoke with staff member, Mrs. Zhao, who provided the following information:     Pt domiciled at residence since 2021. Pt endorsed SI today with no direct plan. Stressor of conflict with housemate said to be ongoing. Pt and peer in separate room. Pt had been upset for hours, not calming down and crying. Pt requested to go to the ED. Staff reports similar situation a few months back to last year. No known self injurious behaviors. Pt also reported to be down most of the week and in her room.  All sharps locked in the home. No access to firearms. Pt is compliant with medication although doesn't follow up with aftercare? No current safety concerns reported. Staff confirmed feeling safe for pt to return. Mode of transportation reported to be AM[facility]ULANCE with [facility]. NP informed. Nursing to arrange transport. halfway address confirmed to be [address] [place] [address]. Verbal huddle occurred with interdisciplinary team.

## 2025-01-30 NOTE — ED PROVIDER NOTE - PATIENT PORTAL LINK FT
You can access the FollowMyHealth Patient Portal offered by [facility] by registering at the following website: [email or web address]. By joining [employer]’s FollowMyHealth portal, you will also be able to view your health information using other applications (apps) compatible with our system.

## 2025-01-30 NOTE — ED ADULT NURSE NOTE - NSSUHOSCREENINGYN_ED_ALL_ED
Yes - the patient is able to be screened Follow up with Dr Nice in 1-3 days. Return for worsening pain, difficulty breathing or any other concerning symptoms.     Chest Pain    WHAT YOU NEED TO KNOW:    Chest pain can be caused by a range of conditions, from not serious to life-threatening. Chest pain can be a symptom of a digestive problem, such as acid reflux or a stomach ulcer. An anxiety attack or a strong emotion, such as anger, can also cause chest pain. Infection, inflammation, or a fracture in the bones or cartilage in your chest can cause pain or discomfort. Sometimes chest pain or pressure is caused by poor blood flow to your heart (angina). Chest pain may also be caused by life-threatening conditions such as a heart attack or blood clot in your lungs.     DISCHARGE INSTRUCTIONS:    Call 911 if:     You have any of the following signs of a heart attack:   Squeezing, pressure, or pain in your chest      You may also have any of the following:   Discomfort or pain in your back, neck, jaw, stomach, or arm      Shortness of breath      Nausea or vomiting      Lightheadedness or a sudden cold sweat        Return to the emergency department if:     You have chest discomfort that gets worse, even with medicine.      You cough or vomit blood.       Your bowel movements are black or bloody.       You cannot stop vomiting, or it hurts to swallow.     Contact your healthcare provider if:     You have questions or concerns about your condition or care.        Medicines:     Medicines may be given to treat the cause of your chest pain. Examples include pain medicine, anxiety medicine, or medicines to increase blood flow to your heart.       Do not take certain medicines without asking your healthcare provider first. These include NSAIDs, herbal or vitamin supplements, or hormones (estrogen or progestin).       Take your medicine as directed. Contact your healthcare provider if you think your medicine is not helping or if you have side effects. Tell him or her if you are allergic to any medicine. Keep a list of the medicines, vitamins, and herbs you take. Include the amounts, and when and why you take them. Bring the list or the pill bottles to follow-up visits. Carry your medicine list with you in case of an emergency.    Follow up with your healthcare provider within 72 hours, or as directed: You may need to return for more tests to find the cause of your chest pain. You may be referred to a specialist, such as a cardiologist or gastroenterologist. Write down your questions so you remember to ask them during your visits.     Healthy living tips: The following are general healthy guidelines. If your chest pain is caused by a heart problem, your healthcare provider will give you specific guidelines to follow.    Do not smoke. Nicotine and other chemicals in cigarettes and cigars can cause lung and heart damage. Ask your healthcare provider for information if you currently smoke and need help to quit. E-cigarettes or smokeless tobacco still contain nicotine. Talk to your healthcare provider before you use these products.       Eat a variety of healthy, low-fat, low-salt foods. Healthy foods include fruits, vegetables, whole-grain breads, low-fat dairy products, beans, lean meats, and fish. Ask for more information about a heart healthy diet.      Drink plenty of water every day. Your body is made of mostly water. Water helps your body to control your temperature and blood pressure. Ask your healthcare provider how much water you should drink every day.      Ask about activity. Your healthcare provider will tell you which activities to limit or avoid. Ask when you can drive, return to work, and have sex. Ask about the best exercise plan for you.      Maintain a healthy weight. Ask your healthcare provider how much you should weigh. Ask him or her to help you create a weight loss plan if you are overweight.       Get the flu and pneumonia vaccines. All adults should get the influenza (flu) vaccine. Get it every year as soon as it becomes available. The pneumococcal vaccine is given to adults aged 65 years or older. The vaccine is given every 5 years to prevent pneumococcal disease, such as pneumonia.    If you have a stent:     Carry your stent card with you at all times.       Let all healthcare providers know that you have a stent.          © Copyright [employer] 2019 All illustrations and images included in CareNotes are the copyrighted property of [employer] Inc[employer] or [employer].      back to top                      © Copyright [employer] 2019

## 2025-01-30 NOTE — ED ADULT NURSE NOTE - NS_BH TRG QUESTION8_ED_ALL_ED
Medication:   Requested Prescriptions     Pending Prescriptions Disp Refills    atorvastatin (LIPITOR) 20 MG tablet [Pharmacy Med Name: ATORVASTATIN 20 MG TABLET] 30 tablet 0     Sig: TAKE ONE TABLET BY MOUTH ONCE NIGHTLY        Last Filled: 8/18/2020 #30 Refills 0     Patient Phone Number: [phone] (home) [phone] (work)    Last appt: 2/20/2020   Return in about 6 months (around 8/20/2020) for DM 30 min. Next appt: 11/5/2020    Last OARRS: No flowsheet data found.
No

## 2025-01-30 NOTE — ED PROVIDER NOTE - OBJECTIVE STATEMENT
A 39-year-old female patient with a past medical history of factor V disorder, endometriosis, and diabetes, along with a past psychiatric history of intellectual disability, bipolar disorder, and schizoaffective disorder, presents with increased anxiety. She reports feeling bullied by her roommate, who calls her names, causing her significant distress. She also reports experiencing sudden, intermittent suicidal ideation, but denies any intent or plan to harm herself. The patient states that PRN medication typically alleviates her symptoms. At present, she explicitly denies any suicidal or homicidal ideation, auditory or visual hallucinations, or physical complaints.

## 2025-01-30 NOTE — ED PROVIDER NOTE - NSFOLLOWUPINSTRUCTIONS_ED_ALL_ED_FT
Follow up with your primary care physician and psychiatrist in 48-72 hours.  You may also see the psychiatrist at [facility]:    [address] [place] [address]  Phone: [phone]    [facility] on [facility] Information:    -Walk-in hours: Monday to Friday, 9am to 3pm   -Almost all walk-in patients will be able to see a psych prescriber the same day   -Scheduled, non-urgent, evening remote/virtual appointments are available on a limited basis. Call our  to inquire about these: [phone]. A crisis center clinician screens these requests in the late afternoon and if appropriate it takes a few days to set-up.   -Visits take about 2 to 4 hours total   -Mornings are the best time for patients to arrive    For Telehealth options try:  [employer]: [email or web address] (to access psychiatrist or therapist)  [employer]: [email or web address] (to access psychiatrist or therapist)  [facility]: [email or web address] (Largest online therapy group)      SEEK IMMEDIATE MEDICAL CARE IF YOU HAVE ANY OF THE FOLLOWING SYMPTOMS: thoughts about hurting or killing yourself, thoughts about hurting or killing somebody else, hallucinations or any other worsening or persistent symptoms OR ANY NEW OR CONCERNING SYMPTOMS.

## 2025-01-30 NOTE — ED ADULT NURSE NOTE - NSICDXPASTSURGICALHX_GEN_ALL_CORE_FT
Diagnoses and all orders for this visit:    Diagnoses and all orders for this visit:    1. Chronic pain of right ankle  Symptoms over 3 months and likely related to age-related change with the foot and needs new orthotics  Evaluate for surgery-     REFERRAL TO ORTHOPEDICS    2. Essential hypertension  Improved with increase in hydrochlorothiazide and Bystolic from cardiology  Has not had labs drawn since December we will recheck lites  -     HEMOGLOBIN A1C WITH EAG; Future  -     METABOLIC PANEL, COMPREHENSIVE; Future    3. Primary insomnia  Not controlled  Suspect lack of sleep aggravating pain, blood pressure and PVC burden  We will try low-dose  -     Doxepin 3 mg tab; Take 1/2 hour prior to bed       Chief Complaint   Patient presents with    Ankle Pain     Right ankle pain and swollen --3 months      Right ankle pain  Hx of bilateral foot pronation  Orthotics over 21years old  Symptoms have been progressive over 3 months   Notes now she cannot weight-bear    PVCs  Doing cardiac rehab which has been helpful for her  Working with cardiologist      Abdominal pain  Colonscopy was done April 2018 by Dr. Johnson Ross. She does not recall if she needed a follow-up. No complaints      Subjective:   Gonzalez Toledo is a 82 y.o. female with hypertension. Hypertension ROS: taking medications as instructed, no medication side effects noted, no TIA's, no chest pain on exertion, no dyspnea on exertion, no swelling of ankles.    New concerns: Recent increase in hydrochlorothiazide to 25 mg and increase Bystolic to 10 mg      Past Medical History:   Diagnosis Date    Arrhythmia     Arthritis     CAD (coronary artery disease)     Essential hypertension, benign     Fibromyalgia     Macular degeneration 2020    dr. Luly Dc    Myalgia and myositis, unspecified     Other and unspecified hyperlipidemia 10/12/2010    Palpitations 10/12/2010    Dr. Wayne Valero    Sleep apnea     Dr. Riki Corona, not taking cpap    SOB (shortness of breath) Past Surgical History:   Procedure Laterality Date    HX HYSTERECTOMY      HX ORTHOPAEDIC      thumb and left foot     Social History     Socioeconomic History    Marital status:     Number of children: 3    Years of education: 14    Highest education level: Some college, no degree   Tobacco Use    Smoking status: Never    Smokeless tobacco: Never    Tobacco comments:     passive smoke with    Substance and Sexual Activity    Alcohol use: Yes     Comment: rare    Drug use: No    Sexual activity: Never     Comment:  for 47 years   Other Topics Concern     Service No   Social History Narrative        Retired:  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,    Retired 17 years            3 children: overweight son, smoker son 48 and 46,     Daughter healthy     Family History   Problem Relation Age of Onset    Diabetes Mother 61    Cancer Maternal Grandmother         colon ca    Diabetes Maternal Grandmother 61    Cancer Paternal Grandmother         intestinal ca    Heart Attack Paternal Grandmother     Stroke Paternal Grandmother     Heart Attack Paternal Grandfather     Stroke Paternal Grandfather      Current Outpatient Medications   Medication Sig Dispense Refill    lisinopriL (PRINIVIL, ZESTRIL) 30 mg tablet TAKE 1 TABLET BY MOUTH  DAILY 90 Tablet 3    busPIRone (BUSPAR) 5 mg tablet TAKE 1 TABLET BY MOUTH IN  THE MORNING AND MIDDAY AND  1 TO 2 TABLETS BY MOUTH IN  THE EVENING FOR ANXIETY 360 Tablet 0    atorvastatin (LIPITOR) 20 mg tablet TAKE 1 TABLET BY MOUTH  DAILY 90 Tablet 3    dicyclomine (BENTYL) 10 mg capsule Take 1 Capsule by mouth four (4) times daily. As needed. 30 Capsule 0    albuterol (PROVENTIL HFA, VENTOLIN HFA, PROAIR HFA) 90 mcg/actuation inhaler Take 1 Puff by inhalation every four (4) hours as needed for Wheezing. 1 Each 1    fexofenadine (ALLEGRA) 180 mg tablet Take 1 Tablet by mouth daily.  90 Tablet 1    guaiFENesin ER (MUCINEX) 600 mg ER tablet Take 1 Tab by mouth two (2) times a day. Take with water 30 Tab 0    famotidine (PEPCID) 20 mg tablet Take 20 mg by mouth two (2) times daily as needed. nebivolol (BYSTOLIC) 5 mg tablet Take 1 tablet by mouth daily. (Patient taking differently: Take 10 mg by mouth daily.) 30 tablet 0    cholecalciferol (VITAMIN D3) 25 mcg (1,000 unit) cap Take 2,000 Units by mouth. hydroCHLOROthiazide (HYDRODIURIL) 12.5 mg tablet TAKE 1 TABLET BY MOUTH  DAILY (Patient taking differently: Take 25 mg by mouth daily.) 90 Tablet 3    mexiletine (MEXITIL) 150 mg capsule 150 mg two (2) times a day. cephALEXin (KEFLEX) 500 mg capsule Take 1 Capsule by mouth four (4) times daily. (Patient not taking: Reported on 11/1/2022) 40 Capsule 0    varicella-zoster recombinant, PF, (Shingrix, PF,) 50 mcg/0.5 mL susr injection 0.5mL by IntraMUSCular route once now and then repeat in 2-6 months (Patient not taking: No sig reported) 0.5 mL 1    benzonatate (TESSALON) 100 mg capsule Take 100 mg by mouth three (3) times daily as needed for Cough. (Patient not taking: No sig reported)      vit C/E/Zn/coppr/lutein/zeaxan (PRESERVISION AREDS-2 PO) Take  by mouth.  (Patient not taking: Reported on 3/2/2023)       Allergies   Allergen Reactions    Codeine Palpitations    Levofloxacin Other (comments)    Prednisone Palpitations    Vioxx [Rofecoxib] Palpitations       Review of Systems - General ROS: negative for - chills or fever  Cardiovascular ROS: no chest pain or dyspnea on exertion  Respiratory ROS: no cough, shortness of breath, or wheezing    Visit Vitals  /71 (BP 1 Location: Left upper arm, BP Patient Position: Sitting)   Pulse (!) 57   Temp 97.7 °F (36.5 °C) (Oral)   Resp 18   Ht 5' (1.524 m)   Wt 143 lb 12.8 oz (65.2 kg)   SpO2 94%   BMI 28.08 kg/m²     Constitutional: [x] Appears well-developed and well-nourished [x] No apparent distress      [] Abnormal -     Mental status: [x] Alert and awake  [x] Oriented to person/place/time [x] Able to follow commands    [] Abnormal -     Eyes:   EOM    [x]  Normal    [] Abnormal -   Sclera  [x]  Normal    [] Abnormal -          Discharge [x]  None visible   [] Abnormal -     HENT: [x] Normocephalic, atraumatic  [] Abnormal -   [x] Mouth/Throat: Mucous membranes are moist    External Ears [x] Normal  [] Abnormal -    Neck: [x] No visualized mass [] Abnormal -     Pulmonary/Chest: [x] Respiratory effort normal   [x] No visualized signs of difficulty breathing or respiratory distress        [] Abnormal -      Musculoskeletal:   [x] Normal gait with no signs of ataxia         [x] Normal range of motion of neck        [] Abnormal -     Neurological:        [x] No Facial Asymmetry (Cranial nerve 7 motor function) (limited exam due to video visit)          [x] No gaze palsy        [] Abnormal -          Skin:        [x] No significant exanthematous lesions or discoloration noted on facial skin         [] Abnormal -            Psychiatric:       [x] Normal Affect [] Abnormal -        [x] No Hallucinations                                                                           Jalen Cartwright MD       This medical record was transcribed using an electronic medical records/speech recognition system. Although proofread, it may and can contain electronic, spelling and other errors. Corrections may be executed at a later time. Please contact us for any clarifications as needed. PAST SURGICAL HISTORY:  S/P appendectomy
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
## 2025-01-31 RX ADMIN — Medication 2 MILLIGRAM(S): at 03:45

## 2025-02-01 ENCOUNTER — INPATIENT (INPATIENT)
Facility: HOSPITAL | Age: 40
LOS: 4 days | Discharge: NURSING, REHAB OR LONG-TERM CARE | DRG: 392 | End: 2025-02-06
Attending: STUDENT IN AN ORGANIZED HEALTH CARE EDUCATION/TRAINING PROGRAM | Admitting: HOSPITALIST
Payer: MEDICARE

## 2025-02-01 VITALS
DIASTOLIC BLOOD PRESSURE: 79 MMHG | OXYGEN SATURATION: 96 % | TEMPERATURE: 98 F | SYSTOLIC BLOOD PRESSURE: 104 MMHG | RESPIRATION RATE: 16 BRPM | HEIGHT: 71 IN | WEIGHT: 240.08 LBS | HEART RATE: 103 BPM

## 2025-02-01 DIAGNOSIS — Z90.49 ACQUIRED ABSENCE OF OTHER SPECIFIED PARTS OF DIGESTIVE TRACT: Chronic | ICD-10-CM

## 2025-02-01 LAB
ADD ON TEST-SPECIMEN IN LAB: SIGNIFICANT CHANGE UP
ALBUMIN SERPL ELPH-MCNC: 4 G/DL — SIGNIFICANT CHANGE UP (ref 3.3–5)
ALP SERPL-CCNC: 70 U/L — SIGNIFICANT CHANGE UP (ref 40–120)
ALT FLD-CCNC: 26 U/L — SIGNIFICANT CHANGE UP (ref 10–45)
AMPHET UR-MCNC: NEGATIVE — SIGNIFICANT CHANGE UP
ANION GAP SERPL CALC-SCNC: 18 MMOL/L — HIGH (ref 5–17)
APAP SERPL-MCNC: <15 UG/ML — SIGNIFICANT CHANGE UP (ref 10–30)
APPEARANCE UR: CLEAR — SIGNIFICANT CHANGE UP
APTT BLD: 25.6 SEC — SIGNIFICANT CHANGE UP (ref 24.5–35.6)
AST SERPL-CCNC: 25 U/L — SIGNIFICANT CHANGE UP (ref 10–40)
BACTERIA # UR AUTO: ABNORMAL /HPF
BARBITURATES UR SCN-MCNC: NEGATIVE — SIGNIFICANT CHANGE UP
BASOPHILS # BLD AUTO: 0.02 K/UL — SIGNIFICANT CHANGE UP (ref 0–0.2)
BASOPHILS NFR BLD AUTO: 0.4 % — SIGNIFICANT CHANGE UP (ref 0–2)
BENZODIAZ UR-MCNC: POSITIVE
BILIRUB SERPL-MCNC: <0.1 MG/DL — LOW (ref 0.2–1.2)
BILIRUB UR-MCNC: NEGATIVE — SIGNIFICANT CHANGE UP
BUN SERPL-MCNC: 10 MG/DL — SIGNIFICANT CHANGE UP (ref 7–23)
CALCIUM SERPL-MCNC: 9.3 MG/DL — SIGNIFICANT CHANGE UP (ref 8.4–10.5)
CAST: 0 /LPF — SIGNIFICANT CHANGE UP (ref 0–4)
CHLORIDE SERPL-SCNC: 104 MMOL/L — SIGNIFICANT CHANGE UP (ref 96–108)
CK SERPL-CCNC: 90 U/L — SIGNIFICANT CHANGE UP (ref 25–170)
CO2 SERPL-SCNC: 17 MMOL/L — LOW (ref 22–31)
COCAINE METAB.OTHER UR-MCNC: NEGATIVE — SIGNIFICANT CHANGE UP
COLOR SPEC: YELLOW — SIGNIFICANT CHANGE UP
CREAT SERPL-MCNC: 0.86 MG/DL — SIGNIFICANT CHANGE UP (ref 0.5–1.3)
DIFF PNL FLD: NEGATIVE — SIGNIFICANT CHANGE UP
EGFR: 89 ML/MIN/1.73M2 — SIGNIFICANT CHANGE UP
EOSINOPHIL # BLD AUTO: 0.06 K/UL — SIGNIFICANT CHANGE UP (ref 0–0.5)
EOSINOPHIL NFR BLD AUTO: 1.3 % — SIGNIFICANT CHANGE UP (ref 0–6)
ETHANOL SERPL-MCNC: <10 MG/DL — SIGNIFICANT CHANGE UP (ref 0–10)
GLUCOSE SERPL-MCNC: 113 MG/DL — HIGH (ref 70–99)
GLUCOSE UR QL: NEGATIVE MG/DL — SIGNIFICANT CHANGE UP
HCG SERPL-ACNC: <2 MIU/ML — SIGNIFICANT CHANGE UP
HCG UR QL: NEGATIVE — SIGNIFICANT CHANGE UP
HCT VFR BLD CALC: 33.5 % — LOW (ref 34.5–45)
HGB BLD-MCNC: 10.5 G/DL — LOW (ref 11.5–15.5)
IMM GRANULOCYTES NFR BLD AUTO: 1.1 % — HIGH (ref 0–0.9)
INR BLD: 1.01 RATIO — SIGNIFICANT CHANGE UP (ref 0.85–1.16)
KETONES UR-MCNC: 15 MG/DL
LEUKOCYTE ESTERASE UR-ACNC: NEGATIVE — SIGNIFICANT CHANGE UP
LIDOCAIN IGE QN: 28 U/L — SIGNIFICANT CHANGE UP (ref 7–60)
LYMPHOCYTES # BLD AUTO: 1.68 K/UL — SIGNIFICANT CHANGE UP (ref 1–3.3)
LYMPHOCYTES # BLD AUTO: 37.1 % — SIGNIFICANT CHANGE UP (ref 13–44)
MAGNESIUM SERPL-MCNC: 1.8 MG/DL — SIGNIFICANT CHANGE UP (ref 1.6–2.6)
MCHC RBC-ENTMCNC: 31.3 G/DL — LOW (ref 32–36)
MCHC RBC-ENTMCNC: 31.3 PG — SIGNIFICANT CHANGE UP (ref 27–34)
MCV RBC AUTO: 100 FL — SIGNIFICANT CHANGE UP (ref 80–100)
METHADONE UR-MCNC: NEGATIVE — SIGNIFICANT CHANGE UP
MONOCYTES # BLD AUTO: 0.52 K/UL — SIGNIFICANT CHANGE UP (ref 0–0.9)
MONOCYTES NFR BLD AUTO: 11.5 % — SIGNIFICANT CHANGE UP (ref 2–14)
NEUTROPHILS # BLD AUTO: 2.2 K/UL — SIGNIFICANT CHANGE UP (ref 1.8–7.4)
NEUTROPHILS NFR BLD AUTO: 48.6 % — SIGNIFICANT CHANGE UP (ref 43–77)
NITRITE UR-MCNC: NEGATIVE — SIGNIFICANT CHANGE UP
NRBC # BLD: 0 /100 WBCS — SIGNIFICANT CHANGE UP (ref 0–0)
NRBC BLD-RTO: 0 /100 WBCS — SIGNIFICANT CHANGE UP (ref 0–0)
OPIATES UR-MCNC: NEGATIVE — SIGNIFICANT CHANGE UP
OXYCODONE UR-MCNC: NEGATIVE — SIGNIFICANT CHANGE UP
PCP SPEC-MCNC: SIGNIFICANT CHANGE UP
PCP UR-MCNC: NEGATIVE — SIGNIFICANT CHANGE UP
PH UR: 6 — SIGNIFICANT CHANGE UP (ref 5–8)
PHOSPHATE SERPL-MCNC: 3.1 MG/DL — SIGNIFICANT CHANGE UP (ref 2.5–4.5)
PLATELET # BLD AUTO: 174 K/UL — SIGNIFICANT CHANGE UP (ref 150–400)
POTASSIUM SERPL-MCNC: 5 MMOL/L — SIGNIFICANT CHANGE UP (ref 3.5–5.3)
POTASSIUM SERPL-SCNC: 5 MMOL/L — SIGNIFICANT CHANGE UP (ref 3.5–5.3)
PROT SERPL-MCNC: 7.1 G/DL — SIGNIFICANT CHANGE UP (ref 6–8.3)
PROT UR-MCNC: NEGATIVE MG/DL — SIGNIFICANT CHANGE UP
PROTHROM AB SERPL-ACNC: 11.6 SEC — SIGNIFICANT CHANGE UP (ref 9.9–13.4)
RBC # BLD: 3.35 M/UL — LOW (ref 3.8–5.2)
RBC # FLD: 15.8 % — HIGH (ref 10.3–14.5)
RBC CASTS # UR COMP ASSIST: 2 /HPF — SIGNIFICANT CHANGE UP (ref 0–4)
SALICYLATES SERPL-MCNC: <2 MG/DL — LOW (ref 15–30)
SODIUM SERPL-SCNC: 139 MMOL/L — SIGNIFICANT CHANGE UP (ref 135–145)
SP GR SPEC: >1.03 — HIGH (ref 1–1.03)
SQUAMOUS # UR AUTO: 14 /HPF — HIGH (ref 0–5)
THC UR QL: NEGATIVE — SIGNIFICANT CHANGE UP
UROBILINOGEN FLD QL: 1 MG/DL — SIGNIFICANT CHANGE UP (ref 0.2–1)
VALPROATE SERPL-MCNC: 90 UG/ML — SIGNIFICANT CHANGE UP (ref 50–100)
WBC # BLD: 4.53 K/UL — SIGNIFICANT CHANGE UP (ref 3.8–10.5)
WBC # FLD AUTO: 4.53 K/UL — SIGNIFICANT CHANGE UP (ref 3.8–10.5)
WBC UR QL: 1 /HPF — SIGNIFICANT CHANGE UP (ref 0–5)

## 2025-02-01 PROCEDURE — 99285 EMERGENCY DEPT VISIT HI MDM: CPT | Mod: GC

## 2025-02-01 PROCEDURE — 74177 CT ABD & PELVIS W/CONTRAST: CPT | Mod: 26

## 2025-02-01 RX ORDER — DIPHENHYDRAMINE HCL 25 MG
25 CAPSULE ORAL ONCE
Refills: 0 | Status: COMPLETED | OUTPATIENT
Start: 2025-02-01 | End: 2025-02-01

## 2025-02-01 RX ORDER — ACETAMINOPHEN 160 MG/5ML
975 SUSPENSION ORAL ONCE
Refills: 0 | Status: COMPLETED | OUTPATIENT
Start: 2025-02-01 | End: 2025-02-01

## 2025-02-01 RX ORDER — SODIUM CHLORIDE 9 G/ML
1000 INJECTION, SOLUTION INTRAVENOUS ONCE
Refills: 0 | Status: COMPLETED | OUTPATIENT
Start: 2025-02-01 | End: 2025-02-01

## 2025-02-01 RX ADMIN — ACETAMINOPHEN 975 MILLIGRAM(S): 160 SUSPENSION ORAL at 22:20

## 2025-02-01 RX ADMIN — SODIUM CHLORIDE 2000 MILLILITER(S): 9 INJECTION, SOLUTION INTRAVENOUS at 22:01

## 2025-02-01 RX ADMIN — Medication 25 MILLIGRAM(S): at 22:20

## 2025-02-01 NOTE — ED ADULT NURSE REASSESSMENT NOTE - NS ED NURSE REASSESS COMMENT FT1
Pt updated on plan of care, educated on waiting to eat/drink until CTr. Pt is calm and cooperative at this time. pt offered ice chips. Safety and comfort measures provided.

## 2025-02-01 NOTE — ED PROVIDER NOTE - CLINICAL SUMMARY MEDICAL DECISION MAKING FREE TEXT BOX
see MD note    on lifelong disability, domiciled in [facility] adult home, has a court-appointed legal guardian; lifelong Affective Dysregulation/Intermittent Explosive Disorder, Personality Disorder, Mild Intellectual disability, hx of intrauterine exposure to cocaine, hx of remote Cocaine use disorder, past psychiatric admissions, 2 prior suicidal gestures/self-injurious behavior, + legal history, EXTENSIVE amount of ED visits, many medical admissions (, [facility], [facility], other health system hospitals);  aggressive, threatening, indimidating to peers/staff  PMH: Factor V Leiden Mutation dx in 2016 with multiple DVTs/PE; psychogenic nonepileptic seizures, vitamin D deficiency, hx of DVT and PE, chronic ischemic heart disease, IBS, GERD, PID, endometriosis, headache syndrome, asthma, hx of shoulder abscess with drainage; hypothyroidism. Recent EEGs: psychogenic nonepileptic attacks or functional seizures. Mild diffuse cerebral dysfunction, No epileptiform pattern or seizure observed. MRI head 3/2/22: few scattered nonspc foci of abnormal signal in periventricular/subcortical white matter. DONOVAN 8/22/22: normal study   MEDS: trileptal 150mg bid and  Ativan 0.5mg BID PRN for anxiety; trileptal 150mg PO BID; Eliquis 2.5mg PO BID or 5mg PO bid; Thorazine 50mg QID (7a, 11a, 4pm, 8pm); Seroquel 200mg 8am, 8pm, and 50mg 12 noon; Levothyroxine 75mg daily  ISSUE 1: recurrent admission for “seizures” despite consistently negative recent Neurological work-up and documented “pseudoseizures” ; history of feigning seizure symptoms/signs in order to get medical admission and not be sent back to her residence.  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 ISSUE 3: when admitted, Code Greys, throwing objects, aggression/yelling/curing; posturing/physical intimidation when needs not met

## 2025-02-01 NOTE — ED PROVIDER NOTE - PHYSICAL EXAMINATION
Abiel Mcguire DO (PGY3)   Physical Exam:    Gen: NAD, AOx3  Head: NCAT  HEENT: EOMI, PEERLA  Lung: CTAB, no respiratory distress, no wheezes/rhonchi/rales B/L  CV: RRR, no murmurs, rubs or gallops  Abd: soft, NT, ND, no guarding, no rigidity, no rebound tenderness, no CVA tenderness   MSK: no visible deformities, ROM normal in UE/LE, no back pain  Neuro: No focal sensory or motor deficits. Sensation intact to light touch all extremities.  Skin: Warm, well perfused, no rash, no leg swelling  Psych: normal affect, calm

## 2025-02-01 NOTE — ED PROVIDER NOTE - IV ALTEPLASE EXCL ABS HIDDEN
Reports nasal congestion, cough, and congestion x 1 week, not relieved by OTC medications. Pt is AAO x 3, answers questions appropriately   show

## 2025-02-01 NOTE — ED PROVIDER NOTE - ATTENDING CONTRIBUTION TO CARE
------------ATTENDING NOTE------------  pt brought to ED by EMS from [facility], initially per EMS c/o abdominal pain, per EMS strange affect but no SI / HI / hallucinations and calm / cooperative en route, on my evaluation c/o mid epigastric abdominal ache, has mild dysuria, no fevers, not sexually active, complicated by underlying psychiatric illness and has complex care plan (last updated 2022), complicated by underlying comorbidities, awaiting labs / imaging / close reassessments -->  - Fidel Reed MD   --------------------------------------------------------- ------------ATTENDING NOTE------------  pt brought to ED by EMS from senior living, initially per EMS c/o abdominal pain, per EMS strange affect but no SI / HI / hallucinations and calm / cooperative en route, on my evaluation c/o mid epigastric abdominal ache, has mild dysuria, no fevers, not sexually active, complicated by underlying psychiatric illness and has complex care plan (last updated 2022), complicated by underlying comorbidities, awaiting labs / imaging / close reassessments --> initial albs wnl, pt able to have BM and urinate (pending UA), had mild tingling in bilat anterior thighs when flexed that resolved w/ standing, nvi w/ bcr and 5/5 distally and no back pain, ED sign out 2300 pending full labs / CT / close reassessments for tx / dispo decisions.  - Fidel Reed MD   ---------------------------------------------------------

## 2025-02-01 NOTE — ED ADULT NURSE NOTE - NSFALLHARMRISKINTERV_ED_ALL_ED

## 2025-02-01 NOTE — ED PROVIDER NOTE - OBJECTIVE STATEMENT
39-year-old female past med history of factor V Leyden, endometriosis, diabetes, long psychiatric history of intellectual disability, bipolar disorder and schizoaffective disorder presenting for concerns of constipation.  States she has had generalized abdominal discomfort however her last bowel movement was yesterday.  States "there have been reena.".  Denies any chest pain, shortness of breath, fevers, chills, URI symptoms.  Denies any nausea vomiting diarrhea. 39-year-old female past med history of factor V Leyden, endometriosis, diabetes, long psychiatric history of intellectual disability, bipolar disorder and schizoaffective disorder presenting for concerns of constipation.  States she has had generalized abdominal discomfort however her last bowel movement was yesterday.  States "there have been reena." States that she has not been able to walk properly secondary to the pain.  States that she was offered rehab however declined it.  Denies any chest pain, shortness of breath, fevers, chills, URI symptoms.  Denies any nausea vomiting diarrhea.

## 2025-02-01 NOTE — ED PROVIDER NOTE - CARE PLAN
Principal Discharge DX:	Undifferentiated abdominal pain  Secondary Diagnosis:	Bizarre behavior   1 Principal Discharge DX:	Undifferentiated abdominal pain  Secondary Diagnosis:	Bizarre behavior  Secondary Diagnosis:	Unsteadiness

## 2025-02-01 NOTE — ED PROVIDER NOTE - PROGRESS NOTE DETAILS
Attending Adi Hong:  Patient signed out to me, here for abd pain, here for constipation with BM day prior. Labs and imaging nonactionable. istop w/o chronic opiate rx. Given 5mg oral oxy here pending imaging per patient request. Given nonactionable labs, imaging, no opiate rx indicated nor further opiates needed for analgesia. All discussed with patient with strict return precautions given. Abiel Mcguire DO (PGY3)  Patient states shes unable to walk - states that she has 4 flights of stairs. States that she would like rehab - declined it at prior [facility] visit. Per chart review: Pt currently requires assistance for mobility. She ambulates with RW independently at baseline. Prefers not to go to rehab however agreeable if not yet at baseline function at time of discharge. Acute vs LOY    Patient unable to ambulate in ED - will need admission for further PT eval and placement Attending Adi Hong:  Patient signed out to me, here for abd pain, here for constipation with BM day prior. Labs and imaging nonactionable. istop w/o chronic opiate rx. Given 5mg oral oxy here pending imaging per patient request. Given nonactionable labs, imaging, no opiate rx indicated nor further opiates needed for analgesia.

## 2025-02-01 NOTE — ED ADULT NURSE NOTE - OBJECTIVE STATEMENT
Pt is a 40 y/o F with PMH of DM, HTN, GERD, Hypothyroidism, DVT, Bipolar disorder, Asthma, and intellectual disability presenting from [facility] c/o constipation. Endorses chronic dysuria currently on abx. Pt endorses last normal bowel movement on Saturday, endorses passing small hard bowel movements soon. Endorses passing gas, one episode of nonbloody emesis today. Upon assessment pt is a/o x 3 speaking coherently in full sentences with calm affect. Pt is breathing unlabored and equal BL in no apparent distress, abdomen is soft, nontender, and nondistended, skin is warm and dry. Pt denies SI/HI at this time. Pt denies fevers/chills, headaches/vision changes, chest pain/SOB, n/d, or changes in defecation habits. Pt is in stretcher in lowest position with side rails up.

## 2025-02-02 DIAGNOSIS — Z29.9 ENCOUNTER FOR PROPHYLACTIC MEASURES, UNSPECIFIED: ICD-10-CM

## 2025-02-02 DIAGNOSIS — F25.9 SCHIZOAFFECTIVE DISORDER, UNSPECIFIED: ICD-10-CM

## 2025-02-02 DIAGNOSIS — E11.9 TYPE 2 DIABETES MELLITUS WITHOUT COMPLICATIONS: ICD-10-CM

## 2025-02-02 DIAGNOSIS — R10.9 UNSPECIFIED ABDOMINAL PAIN: ICD-10-CM

## 2025-02-02 DIAGNOSIS — N80.9 ENDOMETRIOSIS, UNSPECIFIED: ICD-10-CM

## 2025-02-02 DIAGNOSIS — R29.898 OTHER SYMPTOMS AND SIGNS INVOLVING THE MUSCULOSKELETAL SYSTEM: ICD-10-CM

## 2025-02-02 DIAGNOSIS — I89.0 LYMPHEDEMA, NOT ELSEWHERE CLASSIFIED: ICD-10-CM

## 2025-02-02 DIAGNOSIS — D68.51 ACTIVATED PROTEIN C RESISTANCE: ICD-10-CM

## 2025-02-02 DIAGNOSIS — Z71.89 OTHER SPECIFIED COUNSELING: ICD-10-CM

## 2025-02-02 LAB
CULTURE RESULTS: SIGNIFICANT CHANGE UP
SPECIMEN SOURCE: SIGNIFICANT CHANGE UP
T3 SERPL-MCNC: 88 NG/DL — SIGNIFICANT CHANGE UP (ref 80–200)
T4 AB SER-ACNC: 5.8 UG/DL — SIGNIFICANT CHANGE UP (ref 4.6–12)
TSH SERPL-MCNC: 0.72 UIU/ML — SIGNIFICANT CHANGE UP (ref 0.27–4.2)

## 2025-02-02 PROCEDURE — 99223 1ST HOSP IP/OBS HIGH 75: CPT

## 2025-02-02 RX ORDER — ACETAMINOPHEN, DIPHENHYDRAMINE HCL, PHENYLEPHRINE HCL 325; 25; 5 MG/1; MG/1; MG/1
3 TABLET ORAL AT BEDTIME
Refills: 0 | Status: DISCONTINUED | OUTPATIENT
Start: 2025-02-02 | End: 2025-02-06

## 2025-02-02 RX ORDER — LIDOCAINE HYDROCHLORIDE 30 MG/G
1 CREAM TOPICAL ONCE
Refills: 0 | Status: COMPLETED | OUTPATIENT
Start: 2025-02-02 | End: 2025-02-02

## 2025-02-02 RX ORDER — MORPHINE SULFATE 60 MG/1
2 TABLET, FILM COATED, EXTENDED RELEASE ORAL ONCE
Refills: 0 | Status: DISCONTINUED | OUTPATIENT
Start: 2025-02-02 | End: 2025-02-02

## 2025-02-02 RX ORDER — DIPHENHYDRAMINE HCL 25 MG
50 CAPSULE ORAL EVERY 6 HOURS
Refills: 0 | Status: DISCONTINUED | OUTPATIENT
Start: 2025-02-02 | End: 2025-02-06

## 2025-02-02 RX ORDER — ROSUVASTATIN CALCIUM 10 MG/1
5 TABLET, FILM COATED ORAL AT BEDTIME
Refills: 0 | Status: DISCONTINUED | OUTPATIENT
Start: 2025-02-02 | End: 2025-02-06

## 2025-02-02 RX ORDER — DIPHENHYDRAMINE HCL 25 MG
25 CAPSULE ORAL ONCE
Refills: 0 | Status: COMPLETED | OUTPATIENT
Start: 2025-02-02 | End: 2025-02-02

## 2025-02-02 RX ORDER — DIPHENHYDRAMINE HCL 25 MG
50 CAPSULE ORAL ONCE
Refills: 0 | Status: COMPLETED | OUTPATIENT
Start: 2025-02-02 | End: 2025-02-02

## 2025-02-02 RX ORDER — RISPERIDONE 3 MG
3 TABLET ORAL AT BEDTIME
Refills: 0 | Status: DISCONTINUED | OUTPATIENT
Start: 2025-02-02 | End: 2025-02-06

## 2025-02-02 RX ORDER — QUETIAPINE FUMARATE 300 MG/1
200 TABLET ORAL AT BEDTIME
Refills: 0 | Status: DISCONTINUED | OUTPATIENT
Start: 2025-02-02 | End: 2025-02-06

## 2025-02-02 RX ORDER — OXYCODONE HYDROCHLORIDE 30 MG/1
5 TABLET ORAL ONCE
Refills: 0 | Status: DISCONTINUED | OUTPATIENT
Start: 2025-02-02 | End: 2025-02-02

## 2025-02-02 RX ORDER — ESCITALOPRAM 10 MG/1
10 TABLET, FILM COATED ORAL DAILY
Refills: 0 | Status: DISCONTINUED | OUTPATIENT
Start: 2025-02-02 | End: 2025-02-06

## 2025-02-02 RX ORDER — LEVOTHYROXINE SODIUM 25 UG/1
100 TABLET ORAL DAILY
Refills: 0 | Status: DISCONTINUED | OUTPATIENT
Start: 2025-02-02 | End: 2025-02-06

## 2025-02-02 RX ORDER — SUCRALFATE 1 G/10ML
1 SUSPENSION ORAL
Refills: 0 | Status: DISCONTINUED | OUTPATIENT
Start: 2025-02-02 | End: 2025-02-06

## 2025-02-02 RX ORDER — MAGNESIUM, ALUMINUM HYDROXIDE 200-225/5
30 SUSPENSION, ORAL (FINAL DOSE FORM) ORAL EVERY 4 HOURS
Refills: 0 | Status: DISCONTINUED | OUTPATIENT
Start: 2025-02-02 | End: 2025-02-06

## 2025-02-02 RX ORDER — NALTREXONE HCL 50 MG
50 TABLET ORAL DAILY
Refills: 0 | Status: DISCONTINUED | OUTPATIENT
Start: 2025-02-02 | End: 2025-02-06

## 2025-02-02 RX ORDER — SUCRALFATE 1 G/10ML
1 SUSPENSION ORAL EVERY 6 HOURS
Refills: 0 | Status: DISCONTINUED | OUTPATIENT
Start: 2025-02-02 | End: 2025-02-05

## 2025-02-02 RX ORDER — DIPHENHYDRAMINE HCL 25 MG
50 CAPSULE ORAL AT BEDTIME
Refills: 0 | Status: DISCONTINUED | OUTPATIENT
Start: 2025-02-02 | End: 2025-02-06

## 2025-02-02 RX ORDER — ALBUTEROL 90 MCG
2 AEROSOL REFILL (GRAM) INHALATION EVERY 6 HOURS
Refills: 0 | Status: DISCONTINUED | OUTPATIENT
Start: 2025-02-02 | End: 2025-02-06

## 2025-02-02 RX ORDER — CLONIDINE HYDROCHLORIDE 0.2 MG/1
0.1 TABLET ORAL AT BEDTIME
Refills: 0 | Status: DISCONTINUED | OUTPATIENT
Start: 2025-02-02 | End: 2025-02-06

## 2025-02-02 RX ORDER — CLONAZEPAM 2 MG
1 TABLET ORAL ONCE
Refills: 0 | Status: DISCONTINUED | OUTPATIENT
Start: 2025-02-02 | End: 2025-02-02

## 2025-02-02 RX ORDER — QUETIAPINE FUMARATE 300 MG/1
25 TABLET ORAL DAILY
Refills: 0 | Status: DISCONTINUED | OUTPATIENT
Start: 2025-02-02 | End: 2025-02-06

## 2025-02-02 RX ORDER — DIVALPROEX SODIUM 500 MG
750 TABLET, DELAYED RELEASE (ENTERIC COATED) ORAL
Refills: 0 | Status: DISCONTINUED | OUTPATIENT
Start: 2025-02-02 | End: 2025-02-06

## 2025-02-02 RX ORDER — SENNOSIDES 8.6 MG
2 TABLET ORAL AT BEDTIME
Refills: 0 | Status: DISCONTINUED | OUTPATIENT
Start: 2025-02-02 | End: 2025-02-06

## 2025-02-02 RX ORDER — APIXABAN 5 MG/1
5 TABLET, FILM COATED ORAL
Refills: 0 | Status: DISCONTINUED | OUTPATIENT
Start: 2025-02-02 | End: 2025-02-06

## 2025-02-02 RX ADMIN — QUETIAPINE FUMARATE 25 MILLIGRAM(S): 300 TABLET ORAL at 10:16

## 2025-02-02 RX ADMIN — CLONIDINE HYDROCHLORIDE 0.1 MILLIGRAM(S): 0.2 TABLET ORAL at 21:08

## 2025-02-02 RX ADMIN — SUCRALFATE 1 GRAM(S): 1 SUSPENSION ORAL at 18:08

## 2025-02-02 RX ADMIN — ROSUVASTATIN CALCIUM 5 MILLIGRAM(S): 10 TABLET, FILM COATED ORAL at 21:08

## 2025-02-02 RX ADMIN — ACETAMINOPHEN, DIPHENHYDRAMINE HCL, PHENYLEPHRINE HCL 3 MILLIGRAM(S): 325; 25; 5 TABLET ORAL at 21:07

## 2025-02-02 RX ADMIN — Medication 25 MILLIGRAM(S): at 00:08

## 2025-02-02 RX ADMIN — QUETIAPINE FUMARATE 200 MILLIGRAM(S): 300 TABLET ORAL at 21:07

## 2025-02-02 RX ADMIN — Medication 50 MILLIGRAM(S): at 18:08

## 2025-02-02 RX ADMIN — Medication 3 MILLIGRAM(S): at 21:07

## 2025-02-02 RX ADMIN — Medication 10 MILLIGRAM(S): at 02:47

## 2025-02-02 RX ADMIN — Medication 750 MILLIGRAM(S): at 18:08

## 2025-02-02 RX ADMIN — Medication 50 MILLIGRAM(S): at 18:07

## 2025-02-02 RX ADMIN — OXYCODONE HYDROCHLORIDE 5 MILLIGRAM(S): 30 TABLET ORAL at 00:08

## 2025-02-02 RX ADMIN — Medication 25 MILLIGRAM(S): at 20:26

## 2025-02-02 RX ADMIN — Medication 50 MILLIGRAM(S): at 10:12

## 2025-02-02 RX ADMIN — Medication 50 MILLIGRAM(S): at 22:58

## 2025-02-02 RX ADMIN — ESCITALOPRAM 10 MILLIGRAM(S): 10 TABLET, FILM COATED ORAL at 10:17

## 2025-02-02 RX ADMIN — MORPHINE SULFATE 2 MILLIGRAM(S): 60 TABLET, FILM COATED, EXTENDED RELEASE ORAL at 06:25

## 2025-02-02 RX ADMIN — APIXABAN 5 MILLIGRAM(S): 5 TABLET, FILM COATED ORAL at 18:07

## 2025-02-02 NOTE — PHYSICAL THERAPY INITIAL EVALUATION ADULT - ACTIVE RANGE OF MOTION EXAMINATION, REHAB EVAL
Unable to actively engage LLE/Right LE Active ROM was WFL (within functional limits)/deficits as listed below

## 2025-02-02 NOTE — H&P ADULT - PROBLEM/PLAN-2
Delivery Discharge Summary  Obstetrics      Primary OB Clinician: Dr. Diana Rey MD    Admission date: 2018  Discharge date: 2018    Disposition: To home, self care    Discharge Diagnosis List:      Patient Active Problem List   Diagnosis    Pregnancy, supervision, normal, first    27 weeks gestation of pregnancy    Fetal cardiac anomaly affecting pregnancy, antepartum    Pregnancy complicated by fetal cerebral ventriculomegaly    Polyhydramnios affecting pregnancy in third trimester    Non-reactive NST (non-stress test)     delivery delivered     delivery -  for Non-Reassuring Fetal Status       Procedure: , due to NRFHT in fetus with known fetal anomaly    Hospital Course:  Ximena Robbins is a 30 y.o. now , POD #2 who was admitted on 2018 at 29w5d for non-reassuring fetal heart tracing. Patient was subsequently admitted to labor and delivery unit with signed consents.     Labor course was complicated by non reassuring fetal heart tracing, and decision was made to proceed with delivery via  which was performed without complications.    Please see delivery note for further details. Her postpartum course was uncomplicated. On discharge day, patient's pain is controlled with oral pain medications. Pt is tolerating ambulation without SOB or CP, and regular diet without N/V. Reports lochia is mild. Denies any HA, vision changes, F/C, LE swelling. Denies any breast pain/soreness.    Pt in stable condition and ready for discharge. She has been instructed to start and/or continue medications and follow up with her obstetrics provider as listed below.    Pertinent studies:  Postpartum CBC  Lab Results   Component Value Date    WBC 14.66 (H) 2018    HGB 9.2 (L) 2018    HCT 27.7 (L) 2018    MCV 94 2018     2018       Immunization History   Administered Date(s) Administered    Influenza - Quadrivalent - PF  11/15/2018    Rho (D) Immune Globulin 2018    Rho (D) Immune Globulin - IM 2018, 2018    Tdap 2018        Delivery:    Episiotomy: None   Lacerations: None   Repair suture:     Repair # of packets:     Blood loss (ml): 0     Birth information:  YOB: 2016   Time of birth: 12:16 PM   Sex: male   Delivery type: , Low Transverse   Gestational Age: 29w5d    Delivery Clinician:      Other providers:       Additional  information:  Forceps:    Vacuum:    Breech:    Observed anomalies      Living?:           APGARS  One minute Five minutes Ten minutes   Skin color:         Heart rate:         Grimace:         Muscle tone:         Breathing:         Totals: 2  7        Placenta: Delivered:       appearance      Patient Instructions:   Current Discharge Medication List      CONTINUE these medications which have NOT CHANGED    Details   ranitidine (ZANTAC) 150 MG tablet Take 150 mg by mouth Daily.         STOP taking these medications       FOLIC ACID, BULK, MISC Comments:   Reason for Stopping:         PNV,calcium 72-iron-folic acid 27 mg iron- 1 mg Tab Comments:   Reason for Stopping:               Discharge Procedure Orders   Other restrictions (specify):   Order Comments: Nothing in vagina for 6 weeks (no intercourse, douching, tampons, etc.)     Notify your health care provider if you experience any of the following:   Order Comments: Soaking through 1 pad/hour for greater than 2 hours     Notify your health care provider if you experience any of the following:  increased confusion or weakness     Notify your health care provider if you experience any of the following:  persistent dizziness, light-headedness, or visual disturbances     Notify your health care provider if you experience any of the following:  severe persistent headache     Notify your health care provider if you experience any of the following:  difficulty breathing or increased cough     Notify your  health care provider if you experience any of the following:  redness, tenderness, or signs of infection (pain, swelling, redness, odor or green/yellow discharge around incision site)     Notify your health care provider if you experience any of the following:  severe uncontrolled pain     Notify your health care provider if you experience any of the following:  temperature >100.4     Notify your health care provider if you experience any of the following:  persistent nausea and vomiting or diarrhea     Activity as tolerated       Follow-up Information     Diana Rey MD. Schedule an appointment as soon as possible for a visit in 6 weeks.    Specialty:  Obstetrics and Gynecology  Why:  post partum visit  Contact information:  [address] [facility] [address]  [phone]                    Leesa Diaz MD   OBGYN, PGY-1       DISPLAY PLAN FREE TEXT

## 2025-02-02 NOTE — PHYSICAL THERAPY INITIAL EVALUATION ADULT - BED MOBILITY LIMITATIONS, REHAB EVAL
decreased ability to use legs for bridging/pushing Complex Repair And Graft Additional Text (Will Appearing After The Standard Complex Repair Text): The complex repair was not sufficient to completely close the primary defect. The remaining additional defect was repaired with the graft mentioned below.

## 2025-02-02 NOTE — PHYSICAL THERAPY INITIAL EVALUATION ADULT - PERTINENT HX OF CURRENT PROBLEM, REHAB EVAL
39-year-old female past med history of factor V Leyden, endometriosis, diabetes, long psychiatric history of intellectual disability, bipolar disorder and schizoaffective disorder presenting for concerns of constipation.  In the ED she reported abdominal pain and constipation stating her stool was in small reena causing inability to walk. She reports intermittent numbness of her left leg which is chronic. She was hospitalized 2 weeks ago at [facility] and recommended for Rehab but declined. She now says she wants to go to rehab. Denies any chest pain, shortness of breath, fevers, chills, URI symptoms.  Denies any nausea vomiting diarrhea. She also reports diffuse pruritis and asks for benadryl. Hospital course: (2/1) CT Abd/Pelvis: No acute findings.

## 2025-02-02 NOTE — CHART NOTE - NSCHARTNOTEFT_GEN_A_CORE
Confidential Drug Utilization Report  Search Terms: Jt Marte, 1985Search Date: 02/02/2025 02:21:53 AM  The Drug Utilization Report below displays all of the controlled substance prescriptions, if any, that your patient has filled in the last twelve months. The information displayed on this report is compiled from pharmacy submissions to the Department, and accurately reflects the information as submitted by the pharmacies.    This report was requested by: Adi Hong | Reference #: [id number]    You have not added a SHERRI number. Keeping your SHERRI number(s) up to date on the My SHERRI # tab will enable the separation of your prescriptions from others in the search results.    Practitioner Count: 4  Pharmacy Count: 1  Current Opioid Prescriptions: 0  Current Benzodiazepine Prescriptions: 1  Current Stimulant Prescriptions: 0      Patient Demographic Information (PDI)       PDI	First Name	Last Name	Birth Date	Gender	Street Address	[facility]	Zip Code  A	Jt Marte	1985	Female	[address]	[address]    Prescription Information      PDI Filter:    PDI	Current Rx	Drug Type	Rx Written	Rx Dispensed	Drug	Quantity	Days Supply	Prescriber Name	Prescriber SHERRI #	Payment Method	Dispenser  A	N	O	01/15/2025	01/15/2025	oxycodone-acetaminophen 5-325 mg tab	6	2	Shun Sanchez	[id number]	Insurance	[facility]  A	Y	B	01/06/2025	01/06/2025	clonazepam 1 mg tablet	60	30	Carmen Weiner MD	[id number]	Insurance	[facility]  A	N	O	12/12/2024	12/13/2024	oxycodone-acetaminophen 5-325 mg tab	4	1	Lazarus, Max (DO)	[id number]	Insurance	[facility]  A	N	B	12/09/2024	12/12/2024	clonazepam 1 mg tablet	30	30	Carmen Weiner MD	[id number]	Insurance	[facility]  A	N	B	12/04/2024	12/06/2024	alprazolam 0.25 mg tablet	30	30	Saúl Bowens, NANCY Mph,PA-C	[id number]	Insurance	[facility]  A	N	O	08/11/2024	08/12/2024	oxycodone-acetaminophen 5-325 mg tab	4	1	Kevin Samaniego)	[id number]	Insurance	[facility]  A	N	O	08/06/2024	08/06/2024	oxycodone hcl (ir) 5 mg tablet	5	2	Breanna Brooks	[id number]	Insurance	[facility]

## 2025-02-02 NOTE — PHYSICAL THERAPY INITIAL EVALUATION ADULT - PASSIVE RANGE OF MOTION EXAMINATION, REHAB EVAL
bilateral upper extremity Passive ROM was WFL (within functional limits)/bilateral lower extremity Passive ROM was WFL (within functional limits)
contact guard

## 2025-02-02 NOTE — PATIENT PROFILE ADULT - FALL HARM RISK - HARM RISK INTERVENTIONS

## 2025-02-02 NOTE — PHYSICAL THERAPY INITIAL EVALUATION ADULT - NSPTDISCHREC_GEN_A_CORE
If home, pt. will need 24 hrs x 7 days assistance for bed & OOB mobility, bedside commode due to confinement in a room w/o washroom & decreased gait tolerance, poly fly WC for long distance, HPT/Sub-acute Rehab

## 2025-02-02 NOTE — PHYSICAL THERAPY INITIAL EVALUATION ADULT - GAIT DEVIATIONS NOTED, PT EVAL
decreased brandon/decreased velocity of limb motion/decreased step length/decreased weight-shifting ability

## 2025-02-02 NOTE — H&P ADULT - HISTORY OF PRESENT ILLNESS
39-year-old female past med history of factor V Leyden, endometriosis, diabetes, long psychiatric history of intellectual disability, bipolar disorder and schizoaffective disorder presenting for concerns of constipation.  States she has had generalized abdominal discomfort however her last bowel movement was yesterday.  States "there have been reena." States that she has not been able to walk properly secondary to the pain.  States that she was offered rehab however declined it.  Denies any chest pain, shortness of breath, fevers, chills, URI symptoms.  Denies any nausea vomiting diarrhea. 39-year-old female past med history of factor V Leyden, endometriosis, diabetes, long psychiatric history of intellectual disability, bipolar disorder and schizoaffective disorder presenting for concerns of constipation.  In the ED she reported abdominal pain and constipation stating her stool was in small reena causing inability to walk. She reports intermittent numbness of her left leg which is chronic. She was hospitalized 2 weeks ago at [facility] and recommended for Rehab but declined. She now says she wants to go to rehab. Denies any chest pain, shortness of breath, fevers, chills, URI symptoms.  Denies any nausea vomiting diarrhea. She also reports diffuse pruritis and asks for benadryl.

## 2025-02-02 NOTE — H&P ADULT - ASSESSMENT
39-year-old female past med history of factor V Leyden, endometriosis, diabetes, long psychiatric history of intellectual disability, bipolar disorder and schizoaffective disorder presenting for concerns of constipation.

## 2025-02-02 NOTE — PHYSICAL THERAPY INITIAL EVALUATION ADULT - IMPAIRED TRANSFERS: SIT/STAND, REHAB EVAL
impaired balance/impaired motor control/decreased sensation/impaired sensory feedback/decreased strength

## 2025-02-02 NOTE — H&P ADULT - PROBLEM SELECTOR PLAN 1
likely due to constipation    pt says she is having BMs  continue Naltrexone  start Senna 2 tabs nightly PRN

## 2025-02-02 NOTE — H&P ADULT - PROBLEM SELECTOR PLAN 2
The patient is a 79y Female complaining of lacerations.
BL femoral head avascular necrosis likely contributing to chronic pain  leg numbness and weakness unchanged from prior, worked up by Neuro in 2024  was recommended for rehab at [facility] 1/2025 and declined, now wants rehab    continue Flexeril and percocet  continue benadryl prn  PT consult

## 2025-02-02 NOTE — PHYSICAL THERAPY INITIAL EVALUATION ADULT - ADDITIONAL COMMENTS
Patient lives in a group home: 3STE w/B/L handrails or an option of ramp to enter the building; Building has elevator access; however, as per patient, elevator is intermittently out of order; pt. has 4 flights of stairs to reach 4th floor. PTA, pt. was independent in ambulation & ADLs w/RW. As per pt., she does not have any assistance at group home.

## 2025-02-02 NOTE — PHYSICAL THERAPY INITIAL EVALUATION ADULT - BALANCE DISTURBANCE, IDENTIFIED IMPAIRMENT CONTRIBUTE, REHAB EVAL
impaired motor control/impaired postural control/decreased sensation/impaired sensory feedback/decreased strength

## 2025-02-02 NOTE — H&P ADULT - NSHPLABSRESULTS_GEN_ALL_CORE
CBC,  BMP unremakrable    urine pos for bzds - istop consistent with klonopin    CTAP - BL fem head avascular necrosis    prior CT head imaging reviewed

## 2025-02-02 NOTE — PHYSICAL THERAPY INITIAL EVALUATION ADULT - IMPAIRMENTS CONTRIBUTING TO GAIT DEVIATIONS, PT EVAL
impaired balance/impaired motor control/impaired postural control/decreased sensation/impaired sensory feedback/decreased strength

## 2025-02-03 LAB
A1C WITH ESTIMATED AVERAGE GLUCOSE RESULT: 5.4 % — SIGNIFICANT CHANGE UP (ref 4–5.6)
ALBUMIN SERPL ELPH-MCNC: 3.8 G/DL — SIGNIFICANT CHANGE UP (ref 3.3–5)
ALP SERPL-CCNC: 74 U/L — SIGNIFICANT CHANGE UP (ref 40–120)
ALT FLD-CCNC: 27 U/L — SIGNIFICANT CHANGE UP (ref 10–45)
ANION GAP SERPL CALC-SCNC: 13 MMOL/L — SIGNIFICANT CHANGE UP (ref 5–17)
AST SERPL-CCNC: 17 U/L — SIGNIFICANT CHANGE UP (ref 10–40)
BASOPHILS # BLD AUTO: 0.02 K/UL — SIGNIFICANT CHANGE UP (ref 0–0.2)
BASOPHILS NFR BLD AUTO: 0.5 % — SIGNIFICANT CHANGE UP (ref 0–2)
BILIRUB SERPL-MCNC: 0.1 MG/DL — LOW (ref 0.2–1.2)
BUN SERPL-MCNC: 11 MG/DL — SIGNIFICANT CHANGE UP (ref 7–23)
CALCIUM SERPL-MCNC: 9.4 MG/DL — SIGNIFICANT CHANGE UP (ref 8.4–10.5)
CHLORIDE SERPL-SCNC: 102 MMOL/L — SIGNIFICANT CHANGE UP (ref 96–108)
CO2 SERPL-SCNC: 23 MMOL/L — SIGNIFICANT CHANGE UP (ref 22–31)
CREAT SERPL-MCNC: 0.69 MG/DL — SIGNIFICANT CHANGE UP (ref 0.5–1.3)
EGFR: 115 ML/MIN/1.73M2 — SIGNIFICANT CHANGE UP
EOSINOPHIL # BLD AUTO: 0.08 K/UL — SIGNIFICANT CHANGE UP (ref 0–0.5)
EOSINOPHIL NFR BLD AUTO: 1.9 % — SIGNIFICANT CHANGE UP (ref 0–6)
ESTIMATED AVERAGE GLUCOSE: 108 MG/DL — SIGNIFICANT CHANGE UP (ref 68–114)
GLUCOSE SERPL-MCNC: 95 MG/DL — SIGNIFICANT CHANGE UP (ref 70–99)
HCT VFR BLD CALC: 29.7 % — LOW (ref 34.5–45)
HGB BLD-MCNC: 9.5 G/DL — LOW (ref 11.5–15.5)
IMM GRANULOCYTES NFR BLD AUTO: 0.9 % — SIGNIFICANT CHANGE UP (ref 0–0.9)
LYMPHOCYTES # BLD AUTO: 1.89 K/UL — SIGNIFICANT CHANGE UP (ref 1–3.3)
LYMPHOCYTES # BLD AUTO: 44 % — SIGNIFICANT CHANGE UP (ref 13–44)
MCHC RBC-ENTMCNC: 31.4 PG — SIGNIFICANT CHANGE UP (ref 27–34)
MCHC RBC-ENTMCNC: 32 G/DL — SIGNIFICANT CHANGE UP (ref 32–36)
MCV RBC AUTO: 98 FL — SIGNIFICANT CHANGE UP (ref 80–100)
MONOCYTES # BLD AUTO: 0.62 K/UL — SIGNIFICANT CHANGE UP (ref 0–0.9)
MONOCYTES NFR BLD AUTO: 14.4 % — HIGH (ref 2–14)
NEUTROPHILS # BLD AUTO: 1.65 K/UL — LOW (ref 1.8–7.4)
NEUTROPHILS NFR BLD AUTO: 38.3 % — LOW (ref 43–77)
NRBC # BLD: 0 /100 WBCS — SIGNIFICANT CHANGE UP (ref 0–0)
NRBC BLD-RTO: 0 /100 WBCS — SIGNIFICANT CHANGE UP (ref 0–0)
PLATELET # BLD AUTO: 153 K/UL — SIGNIFICANT CHANGE UP (ref 150–400)
POTASSIUM SERPL-MCNC: 4.7 MMOL/L — SIGNIFICANT CHANGE UP (ref 3.5–5.3)
POTASSIUM SERPL-SCNC: 4.7 MMOL/L — SIGNIFICANT CHANGE UP (ref 3.5–5.3)
PROT SERPL-MCNC: 6.3 G/DL — SIGNIFICANT CHANGE UP (ref 6–8.3)
RBC # BLD: 3.03 M/UL — LOW (ref 3.8–5.2)
RBC # FLD: 15.7 % — HIGH (ref 10.3–14.5)
SODIUM SERPL-SCNC: 138 MMOL/L — SIGNIFICANT CHANGE UP (ref 135–145)
TSH SERPL-MCNC: 0.71 UIU/ML — SIGNIFICANT CHANGE UP (ref 0.27–4.2)
WBC # BLD: 4.3 K/UL — SIGNIFICANT CHANGE UP (ref 3.8–10.5)
WBC # FLD AUTO: 4.3 K/UL — SIGNIFICANT CHANGE UP (ref 3.8–10.5)

## 2025-02-03 PROCEDURE — 99233 SBSQ HOSP IP/OBS HIGH 50: CPT

## 2025-02-03 RX ORDER — GABAPENTIN 800 MG/1
100 TABLET ORAL ONCE
Refills: 0 | Status: COMPLETED | OUTPATIENT
Start: 2025-02-03 | End: 2025-02-03

## 2025-02-03 RX ORDER — ACETAMINOPHEN 160 MG/5ML
1000 SUSPENSION ORAL ONCE
Refills: 0 | Status: DISCONTINUED | OUTPATIENT
Start: 2025-02-03 | End: 2025-02-06

## 2025-02-03 RX ADMIN — GABAPENTIN 100 MILLIGRAM(S): 800 TABLET ORAL at 22:01

## 2025-02-03 RX ADMIN — ESCITALOPRAM 10 MILLIGRAM(S): 10 TABLET, FILM COATED ORAL at 12:16

## 2025-02-03 RX ADMIN — CLONIDINE HYDROCHLORIDE 0.1 MILLIGRAM(S): 0.2 TABLET ORAL at 21:48

## 2025-02-03 RX ADMIN — APIXABAN 5 MILLIGRAM(S): 5 TABLET, FILM COATED ORAL at 05:51

## 2025-02-03 RX ADMIN — APIXABAN 5 MILLIGRAM(S): 5 TABLET, FILM COATED ORAL at 17:21

## 2025-02-03 RX ADMIN — Medication 50 MILLIGRAM(S): at 12:15

## 2025-02-03 RX ADMIN — Medication 750 MILLIGRAM(S): at 17:19

## 2025-02-03 RX ADMIN — Medication 5 MILLIGRAM(S): at 13:57

## 2025-02-03 RX ADMIN — Medication 50 MILLIGRAM(S): at 19:59

## 2025-02-03 RX ADMIN — QUETIAPINE FUMARATE 200 MILLIGRAM(S): 300 TABLET ORAL at 21:48

## 2025-02-03 RX ADMIN — Medication 750 MILLIGRAM(S): at 05:51

## 2025-02-03 RX ADMIN — QUETIAPINE FUMARATE 25 MILLIGRAM(S): 300 TABLET ORAL at 12:16

## 2025-02-03 RX ADMIN — Medication 50 MILLIGRAM(S): at 13:57

## 2025-02-03 RX ADMIN — SUCRALFATE 1 GRAM(S): 1 SUSPENSION ORAL at 05:51

## 2025-02-03 RX ADMIN — ACETAMINOPHEN, DIPHENHYDRAMINE HCL, PHENYLEPHRINE HCL 3 MILLIGRAM(S): 325; 25; 5 TABLET ORAL at 21:48

## 2025-02-03 RX ADMIN — ROSUVASTATIN CALCIUM 5 MILLIGRAM(S): 10 TABLET, FILM COATED ORAL at 21:47

## 2025-02-03 RX ADMIN — SUCRALFATE 1 GRAM(S): 1 SUSPENSION ORAL at 17:20

## 2025-02-03 RX ADMIN — Medication 3 MILLIGRAM(S): at 21:48

## 2025-02-03 RX ADMIN — LEVOTHYROXINE SODIUM 100 MICROGRAM(S): 25 TABLET ORAL at 05:51

## 2025-02-03 RX ADMIN — Medication 50 MILLIGRAM(S): at 05:51

## 2025-02-03 NOTE — PROGRESS NOTE ADULT - PROBLEM SELECTOR PLAN 4
not on any medications  was admitted for hypoglycemia last month  send HbA1c - 5.4. not diabetic at this time. Could consider sending a fructosamine.

## 2025-02-03 NOTE — PROGRESS NOTE ADULT - PROBLEM SELECTOR PLAN 9
DVT ppx: on AC  Diet: Regular  Dispo: PT eval for rehab recs LOY.    10. D/w patient and ACP Mari. Pending LOY.

## 2025-02-03 NOTE — PROGRESS NOTE ADULT - PROBLEM SELECTOR PLAN 2
BL femoral head avascular necrosis likely contributing to chronic pain  leg numbness and weakness unchanged from prior, worked up by Neuro in 2024  was recommended for rehab at [facility] 1/2025 and declined, now wants rehab    continue Flexeril and percocet. -iSTOP in chart.   continue benadryl prn  PT consult recs LOY.

## 2025-02-04 ENCOUNTER — TRANSCRIPTION ENCOUNTER (OUTPATIENT)
Age: 40
End: 2025-02-04

## 2025-02-04 DIAGNOSIS — G89.29 OTHER CHRONIC PAIN: ICD-10-CM

## 2025-02-04 PROCEDURE — 99232 SBSQ HOSP IP/OBS MODERATE 35: CPT

## 2025-02-04 RX ORDER — POLYETHYLENE GLYCOL 3350 17 G/17G
17 POWDER, FOR SOLUTION ORAL DAILY
Refills: 0 | Status: DISCONTINUED | OUTPATIENT
Start: 2025-02-04 | End: 2025-02-06

## 2025-02-04 RX ORDER — GABAPENTIN 800 MG/1
300 TABLET ORAL ONCE
Refills: 0 | Status: COMPLETED | OUTPATIENT
Start: 2025-02-04 | End: 2025-02-04

## 2025-02-04 RX ORDER — TIZANIDINE HCL 4 MG
2 TABLET ORAL ONCE
Refills: 0 | Status: COMPLETED | OUTPATIENT
Start: 2025-02-04 | End: 2025-02-04

## 2025-02-04 RX ORDER — GABAPENTIN 800 MG/1
100 TABLET ORAL THREE TIMES A DAY
Refills: 0 | Status: DISCONTINUED | OUTPATIENT
Start: 2025-02-04 | End: 2025-02-05

## 2025-02-04 RX ADMIN — Medication 2 MILLIGRAM(S): at 21:17

## 2025-02-04 RX ADMIN — GABAPENTIN 300 MILLIGRAM(S): 800 TABLET ORAL at 23:03

## 2025-02-04 RX ADMIN — SUCRALFATE 1 GRAM(S): 1 SUSPENSION ORAL at 05:41

## 2025-02-04 RX ADMIN — LEVOTHYROXINE SODIUM 100 MICROGRAM(S): 25 TABLET ORAL at 05:42

## 2025-02-04 RX ADMIN — ROSUVASTATIN CALCIUM 5 MILLIGRAM(S): 10 TABLET, FILM COATED ORAL at 21:18

## 2025-02-04 RX ADMIN — SUCRALFATE 1 GRAM(S): 1 SUSPENSION ORAL at 17:06

## 2025-02-04 RX ADMIN — Medication 750 MILLIGRAM(S): at 17:06

## 2025-02-04 RX ADMIN — GABAPENTIN 300 MILLIGRAM(S): 800 TABLET ORAL at 00:39

## 2025-02-04 RX ADMIN — Medication 50 MILLIGRAM(S): at 17:08

## 2025-02-04 RX ADMIN — Medication 3 MILLIGRAM(S): at 21:18

## 2025-02-04 RX ADMIN — APIXABAN 5 MILLIGRAM(S): 5 TABLET, FILM COATED ORAL at 05:42

## 2025-02-04 RX ADMIN — ACETAMINOPHEN, DIPHENHYDRAMINE HCL, PHENYLEPHRINE HCL 3 MILLIGRAM(S): 325; 25; 5 TABLET ORAL at 21:18

## 2025-02-04 RX ADMIN — CLONIDINE HYDROCHLORIDE 0.1 MILLIGRAM(S): 0.2 TABLET ORAL at 21:18

## 2025-02-04 RX ADMIN — GABAPENTIN 100 MILLIGRAM(S): 800 TABLET ORAL at 17:06

## 2025-02-04 RX ADMIN — ESCITALOPRAM 10 MILLIGRAM(S): 10 TABLET, FILM COATED ORAL at 11:30

## 2025-02-04 RX ADMIN — Medication 50 MILLIGRAM(S): at 11:30

## 2025-02-04 RX ADMIN — Medication 5 MILLIGRAM(S): at 21:20

## 2025-02-04 RX ADMIN — GABAPENTIN 300 MILLIGRAM(S): 800 TABLET ORAL at 07:18

## 2025-02-04 RX ADMIN — Medication 750 MILLIGRAM(S): at 05:41

## 2025-02-04 RX ADMIN — APIXABAN 5 MILLIGRAM(S): 5 TABLET, FILM COATED ORAL at 17:06

## 2025-02-04 RX ADMIN — QUETIAPINE FUMARATE 25 MILLIGRAM(S): 300 TABLET ORAL at 11:30

## 2025-02-04 RX ADMIN — QUETIAPINE FUMARATE 200 MILLIGRAM(S): 300 TABLET ORAL at 21:17

## 2025-02-04 RX ADMIN — Medication 50 MILLIGRAM(S): at 07:40

## 2025-02-04 RX ADMIN — Medication 50 MILLIGRAM(S): at 21:18

## 2025-02-04 NOTE — DISCHARGE NOTE PROVIDER - HOSPITAL COURSE
A 39-year-old female with a complex medical history, including Factor V Leiden mutation, endometriosis, diabetes, intellectual disability, bipolar disorder, and schizoaffective disorder, presented with concerns of constipation linked to abdominal pain. Despite reports of ongoing bowel movements, a conservative treatment with Naltrexone continued, accompanied by Senna as needed for constipation relief.    The patient also experiences bilateral leg weakness contributed to by bilateral femoral head avascular necrosis. Previous neurological evaluations in 2024 recommended rehabilitation, which the patient initially declined but has now shown interest in pursuing. Medication management for her chronic pain includes Flexeril and Percocet, with Benadryl as needed, and a physical therapy consultation has recommended a transfer to a Skilled Nursing Facility ([facility]) for further management and rehabilitation.    Regarding her endometriosis, noted by an irregular menstrual cycle with the last reported period in December, she continues without hormonal treatment interventions. Her diabetes management raised concerns due to a recent hypoglycemic event, despite her current non-diabetic HbA1c level of 5.4; monitoring will continue with consideration for further testing like fructosamine.    Her psychiatric conditions, including schizoaffective disorder, are managed with ongoing Depakote and Seroquel therapy, supplemented by Benadryl and melatonin for sleep disturbances.    For her heterozygous Factor V Leiden mutation, she remains on Eliquis 5mg twice daily. Finally, she suffers from chronic lymphedema in her legs, likely exacerbated by obesity. Thyroid functioning tests remain within normal limits, and she currently displays no cardiac symptoms.    Overall, her care requires multidisciplinary coordination across various specialties to address her extensive and complex medical needs, ensuring both acute issues and chronic diseases are managed effectively. HPI:  39-year-old female past med history of factor V Leyden, endometriosis, diabetes, long psychiatric history of intellectual disability, bipolar disorder and schizoaffective disorder presenting for concerns of constipation.  In the ED she reported abdominal pain and constipation stating her stool was in small reena causing inability to walk. She reports intermittent numbness of her left leg which is chronic. She was hospitalized 2 weeks ago at [facility] and recommended for Rehab but declined. She now says she wants to go to rehab. Denies any chest pain, shortness of breath, fevers, chills, URI symptoms.  Denies any nausea vomiting diarrhea. She also reports diffuse pruritis and asks for benadryl.  (02 Feb 2025 09:13)    Hospital Course:  The patient's abdominal pain was attributed to constipation and treated with Senna. Her chronic pain, attributed to bilateral femoral head avascular necrosis, improved with gabapentin. Flexeril was reportedly not helpful. She has a history of bilateral leg numbness and weakness, unchanged from a prior neurology workup in 2024, at which time she declined rehab. She now desires rehab, and physical therapy was consulted for short-term rehab recommendations. She continued Flexeril and neurontin.  The patient's last menstrual period was in December, and her menses are reported to be irregular. She is not on hormonal therapy for her endometriosis.  Her psychiatric medications (Depakote, Seroquel, benadryl, and melatonin) were continued. She remains on Eliquis for her Factor V Leiden. Her leg lymphedema, thought to be secondary to obesity, is chronic and unchanged. Her thyroid-stimulating hormone (TSH) was within normal limits, and she denied cardiac symptoms.    Medically cleared for d/c to rehab.    Important Medication Changes and Reason:    Active or Pending Issues Requiring Follow-up:    Advanced Directives:   [X ] Full code  [ ] DNR  [ ] Hospice    Discharge Diagnoses:

## 2025-02-04 NOTE — PROGRESS NOTE ADULT - PROBLEM SELECTOR PLAN 5
continue Depakote and Seroquel  benadryl and melatonin qhs not on any medications  was admitted for hypoglycemia last month  send HbA1c - 5.4. not diabetic at this time. Could consider sending a fructosamine.

## 2025-02-04 NOTE — PROGRESS NOTE ADULT - PROBLEM SELECTOR PLAN 7
chronic lymphedema, likely due to obesity, -TSH wnl  no cardiac symptoms continue Eliquis 5mg PO BID

## 2025-02-04 NOTE — PROGRESS NOTE ADULT - PROBLEM SELECTOR PLAN 2
BL femoral head avascular necrosis likely contributing to chronic pain  leg numbness and weakness unchanged from prior, worked up by Neuro in 2024  was recommended for rehab at [facility] 1/2025 and declined, now wants rehab  continue Flexeril and percocet. -iSTOP in chart.   continue benadryl prn  PT consult recs LOY. improved with gabapentin per pt  flexeril not that helpful  can start gabapentin 100 mg TID starting today  consult chronic pain

## 2025-02-04 NOTE — PROGRESS NOTE ADULT - PROBLEM SELECTOR PLAN 3
LMP in December, pt's periods are irregular and not on any hormonal agents BL femoral head avascular necrosis likely contributing to chronic pain  leg numbness and weakness unchanged from prior, worked up by Neuro in 2024  was recommended for rehab at [facility] 1/2025 and declined, now wants rehab  continue Flexeril and percocet. -iSTOP in chart.   continue benadryl prn  PT consult recs LOY.

## 2025-02-04 NOTE — PROGRESS NOTE ADULT - PROBLEM SELECTOR PLAN 9
DVT ppx: on AC  Diet: Regular  Dispo: PT eval for rehab recs LOY.  10. D/w patient and ACP Odalys Denton. Pending LOY. complex care plan reviewed

## 2025-02-04 NOTE — DISCHARGE NOTE PROVIDER - NSDCCPCAREPLAN_GEN_ALL_CORE_FT
PRINCIPAL DISCHARGE DIAGNOSIS  Diagnosis: Abdominal pain  Assessment and Plan of Treatment: no acute findings on CT  likely due to constipation  continue bowel regimen      SECONDARY DISCHARGE DIAGNOSES  Diagnosis: Unsteadiness  Assessment and Plan of Treatment: Physical therapy    Diagnosis: Diabetes mellitus  Assessment and Plan of Treatment: Make sure you get your HgA1c checked every three months.  If you take oral diabetes medications, check your blood glucose two times a day.  If you take insulin, check your blood glucose before meals and at bedtime.  It's important not to skip any meals.  Keep a log of your blood glucose results and always take it with you to your doctor appointments.  Keep a list of your current medications including injectables and over the counter medications and bring this medication list with you to all your doctor appointments.  If you have not seen your ophthalmologist this year call for appointment.  Check your feet daily for redness, sores, or openings. Do not self treat. If no improvement in two days call your primary care physician for an appointment.  Low blood sugar (hypoglycemia) is a blood sugar below 70mg/dl. Check your blood sugar if you feel signs/symptoms of hypoglycemia. If your blood sugar is below 70 take 15 grams of carbohydrates (ex 4 oz of apple juice, 3-4 glucose tablets, or 4-6 oz of regular soda) wait 15 minutes and repeat blood sugar to make sure it comes up above 70.  If your blood sugar is above 70 and you are due for a meal, have a meal.  If you are not due for a meal have a snack.  This snack helps keeps your blood sugar at a safe range.     PRINCIPAL DISCHARGE DIAGNOSIS  Diagnosis: Abdominal pain  Assessment and Plan of Treatment: No acute findings on CT.  Most likely due to constipation.  Continue bowel regimen as prescribed.      SECONDARY DISCHARGE DIAGNOSES  Diagnosis: Unsteadiness  Assessment and Plan of Treatment: Continue with Physical therapy at rehab.  Likely due to bilateral femoral AVN.  Continue with flexeril and neurontin as prescribed.  May increase neurontin to 300mg TID if needed.    Diagnosis: Diabetes mellitus  Assessment and Plan of Treatment: Make sure you get your HgA1c checked every three months.  If you take oral diabetes medications, check your blood glucose two times a day.  If you take insulin, check your blood glucose before meals and at bedtime.  It's important not to skip any meals.  Keep a log of your blood glucose results and always take it with you to your doctor appointments.  Keep a list of your current medications including injectables and over the counter medications and bring this medication list with you to all your doctor appointments.  If you have not seen your ophthalmologist this year call for appointment.  Check your feet daily for redness, sores, or openings. Do not self treat. If no improvement in two days call your primary care physician for an appointment.  Low blood sugar (hypoglycemia) is a blood sugar below 70mg/dl. Check your blood sugar if you feel signs/symptoms of hypoglycemia. If your blood sugar is below 70 take 15 grams of carbohydrates (ex 4 oz of apple juice, 3-4 glucose tablets, or 4-6 oz of regular soda) wait 15 minutes and repeat blood sugar to make sure it comes up above 70.  If your blood sugar is above 70 and you are due for a meal, have a meal.  If you are not due for a meal have a snack.  This snack helps keeps your blood sugar at a safe range.

## 2025-02-04 NOTE — DISCHARGE NOTE PROVIDER - NSDCMRMEDTOKEN_GEN_ALL_CORE_FT
Albuterol (Eqv-Proventil HFA) 90 mcg/inh inhalation aerosol: 2 puff(s) inhaled every 6 hours as needed for  shortness of breath and/or wheezing  aluminum hydroxide-magnesium hydroxide 200 mg-200 mg/5 mL oral suspension: 30 milliliter(s) orally every 4 hours As needed Dyspepsia  apixaban 5 mg oral tablet: 1 tab(s) orally every 12 hours  Bactrim  mg-160 mg oral tablet: 1 tab(s) orally 2 times a day  Benadryl 50 mg oral capsule: 1 cap(s) orally every 6 hours as needed for  rash or itching  cloNIDine 0.1 mg oral tablet: 1 orally once a day (at bedtime)  Crestor 5 mg oral tablet: 1 tab(s) orally once a day  Depakote 250 mg oral delayed release tablet: 3 tab(s) orally 2 times a day  Flexeril 5 mg oral tablet: 1 tab(s) orally 3 times a day as needed for  moderate pain  levothyroxine 100 mcg (0.1 mg) oral tablet: 1 tab(s) orally once a day  Lexapro 10 mg oral tablet: 1 tab(s) orally once a day  melatonin 3 mg oral tablet: 1 tab(s) orally once a day (at bedtime)  naltrexone 50 mg oral tablet: 1 tab(s) orally once a day  oxycodone-acetaminophen 5 mg-325 mg oral tablet: 1 tab(s) orally every 8 hours as needed for Severe Pain (7 - 10) MDD: 3 tablets  QUEtiapine 200 mg oral tablet: 1 tab(s) orally once a day (at bedtime)  QUEtiapine 25 mg oral tablet: 1 tab(s) orally once a day  risperiDONE 3 mg oral tablet: 1 orally once a day (at bedtime)  sucralfate 1 g oral tablet: 1 tab(s) orally 2 times a day  sucralfate 1 g oral tablet: 1 tab(s) orally every 6 hours as needed for abdominal discomfort   albuterol 90 mcg/inh inhalation aerosol: 2 puff(s) inhaled every 6 hours As needed for shortness of breath and/or wheezing  aluminum hydroxide-magnesium hydroxide 200 mg-200 mg/5 mL oral suspension: 30 milliliter(s) orally every 4 hours As needed Dyspepsia  apixaban 5 mg oral tablet: 1 tab(s) orally 2 times a day  cloNIDine 0.1 mg oral tablet: 1 tab(s) orally once a day (at bedtime)  cyclobenzaprine 5 mg oral tablet: 1 tab(s) orally 3 times a day  diphenhydrAMINE 50 mg oral capsule: 1 cap(s) orally once a day (at bedtime)  diphenhydrAMINE 50 mg oral capsule: 1 cap(s) orally every 6 hours As needed Rash and/or Itching  divalproex sodium 250 mg oral delayed release tablet: 3 tab(s) orally 2 times a day  escitalopram 10 mg oral tablet: 1 tab(s) orally once a day  gabapentin 100 mg oral capsule: 2 cap(s) orally every 8 hours  levothyroxine 100 mcg (0.1 mg) oral tablet: 1 tab(s) orally once a day  melatonin 3 mg oral tablet: 1 tab(s) orally once a day (at bedtime)  naltrexone 50 mg oral tablet: 1 tab(s) orally once a day  polyethylene glycol 3350 oral powder for reconstitution: 17 gram(s) orally once a day  QUEtiapine 200 mg oral tablet: 1 tab(s) orally once a day (at bedtime)  QUEtiapine 25 mg oral tablet: 1 tab(s) orally once a day  risperiDONE 3 mg oral tablet: 1 tab(s) orally once a day (at bedtime)  rosuvastatin 5 mg oral tablet: 1 tab(s) orally once a day (at bedtime)  senna leaf extract oral tablet: 2 tab(s) orally once a day (at bedtime) As needed Constipation  sucralfate 1 g oral tablet: 1 tab(s) orally 2 times a day

## 2025-02-04 NOTE — PROGRESS NOTE ADULT - PROBLEM SELECTOR PLAN 4
not on any medications  was admitted for hypoglycemia last month  send HbA1c - 5.4. not diabetic at this time. Could consider sending a fructosamine. LMP in December, pt's periods are irregular and not on any hormonal agents

## 2025-02-05 PROCEDURE — 99232 SBSQ HOSP IP/OBS MODERATE 35: CPT

## 2025-02-05 RX ORDER — GABAPENTIN 800 MG/1
200 TABLET ORAL EVERY 8 HOURS
Refills: 0 | Status: DISCONTINUED | OUTPATIENT
Start: 2025-02-05 | End: 2025-02-06

## 2025-02-05 RX ORDER — ACETAMINOPHEN 160 MG/5ML
650 SUSPENSION ORAL ONCE
Refills: 0 | Status: COMPLETED | OUTPATIENT
Start: 2025-02-05 | End: 2025-02-05

## 2025-02-05 RX ORDER — GABAPENTIN 800 MG/1
100 TABLET ORAL ONCE
Refills: 0 | Status: COMPLETED | OUTPATIENT
Start: 2025-02-05 | End: 2025-02-05

## 2025-02-05 RX ADMIN — POLYETHYLENE GLYCOL 3350 17 GRAM(S): 17 POWDER, FOR SOLUTION ORAL at 10:55

## 2025-02-05 RX ADMIN — APIXABAN 5 MILLIGRAM(S): 5 TABLET, FILM COATED ORAL at 05:55

## 2025-02-05 RX ADMIN — ACETAMINOPHEN, DIPHENHYDRAMINE HCL, PHENYLEPHRINE HCL 3 MILLIGRAM(S): 325; 25; 5 TABLET ORAL at 21:27

## 2025-02-05 RX ADMIN — Medication 50 MILLIGRAM(S): at 10:55

## 2025-02-05 RX ADMIN — CLONIDINE HYDROCHLORIDE 0.1 MILLIGRAM(S): 0.2 TABLET ORAL at 21:28

## 2025-02-05 RX ADMIN — SUCRALFATE 1 GRAM(S): 1 SUSPENSION ORAL at 05:54

## 2025-02-05 RX ADMIN — GABAPENTIN 200 MILLIGRAM(S): 800 TABLET ORAL at 21:30

## 2025-02-05 RX ADMIN — Medication 50 MILLIGRAM(S): at 22:24

## 2025-02-05 RX ADMIN — Medication 750 MILLIGRAM(S): at 05:54

## 2025-02-05 RX ADMIN — APIXABAN 5 MILLIGRAM(S): 5 TABLET, FILM COATED ORAL at 17:39

## 2025-02-05 RX ADMIN — Medication 5 MILLIGRAM(S): at 05:54

## 2025-02-05 RX ADMIN — Medication 50 MILLIGRAM(S): at 17:39

## 2025-02-05 RX ADMIN — GABAPENTIN 100 MILLIGRAM(S): 800 TABLET ORAL at 05:55

## 2025-02-05 RX ADMIN — SUCRALFATE 1 GRAM(S): 1 SUSPENSION ORAL at 17:39

## 2025-02-05 RX ADMIN — Medication 750 MILLIGRAM(S): at 17:39

## 2025-02-05 RX ADMIN — Medication 3 MILLIGRAM(S): at 21:27

## 2025-02-05 RX ADMIN — QUETIAPINE FUMARATE 25 MILLIGRAM(S): 300 TABLET ORAL at 10:56

## 2025-02-05 RX ADMIN — ROSUVASTATIN CALCIUM 5 MILLIGRAM(S): 10 TABLET, FILM COATED ORAL at 21:27

## 2025-02-05 RX ADMIN — GABAPENTIN 100 MILLIGRAM(S): 800 TABLET ORAL at 23:04

## 2025-02-05 RX ADMIN — Medication 5 MILLIGRAM(S): at 21:27

## 2025-02-05 RX ADMIN — ESCITALOPRAM 10 MILLIGRAM(S): 10 TABLET, FILM COATED ORAL at 10:56

## 2025-02-05 RX ADMIN — GABAPENTIN 200 MILLIGRAM(S): 800 TABLET ORAL at 17:39

## 2025-02-05 RX ADMIN — GABAPENTIN 100 MILLIGRAM(S): 800 TABLET ORAL at 13:08

## 2025-02-05 RX ADMIN — LEVOTHYROXINE SODIUM 100 MICROGRAM(S): 25 TABLET ORAL at 05:54

## 2025-02-05 RX ADMIN — QUETIAPINE FUMARATE 200 MILLIGRAM(S): 300 TABLET ORAL at 21:27

## 2025-02-05 NOTE — OCCUPATIONAL THERAPY INITIAL EVALUATION ADULT - PERTINENT HX OF CURRENT PROBLEM, REHAB EVAL
39-year-old female past med history of factor V Leyden, endometriosis, diabetes, long psychiatric history of intellectual disability, bipolar disorder and schizoaffective disorder presenting for concerns of constipation.  In the ED she reported abdominal pain and constipation stating her stool was in small reena causing inability to walk. She reports intermittent numbness of her left leg which is chronic. She was hospitalized 2 weeks ago at [facility] and recommended for Rehab but declined. She now says she wants to go to rehab. Denies any chest pain, shortness of breath, fevers, chills, URI symptoms.  Denies any nausea vomiting diarrhea.   CT Abd/Pelvis: No acute findings.

## 2025-02-05 NOTE — PROGRESS NOTE ADULT - TIME BILLING
- Ordering, reviewing, and/or interpreting labs, testing, and/or imaging  - Independently obtaining a review of systems and performing a physical exam  - Reviewing prior records and where necessary, outpatient records and/or consultant documentation  - Counselling and educating patient and/or family regarding interpretation of aforementioned items and plan of care
- Ordering, reviewing, and/or interpreting labs, testing, and/or imaging  - Independently obtaining a review of systems and performing a physical exam  - Reviewing prior records and where necessary, outpatient records and/or consultant documentation  - Counselling and educating patient and/or family regarding interpretation of aforementioned items and plan of care

## 2025-02-05 NOTE — OCCUPATIONAL THERAPY INITIAL EVALUATION ADULT - LEVEL OF INDEPENDENCE: SIT/SUPINE, REHAB EVAL
EMERGENCY DEPARTMENT ENCOUNTER    Room Number:  06/06  Date seen:  3/23/2024  Time seen: 12:41 EDT  PCP: Luiza Terrazas APRN  Historian: patient    Discussed/obtained information from independent historians: n/a    HPI:  Chief complaint:right forearm skin tear  A complete HPI/ROS/PMH/PSH/SH/FH are unobtainable due to: n/a  Context:Silke Arreguin is a 67 y.o. female with history of cervical spondylosis, lumbar spondylosis, peripheral vascular disease, hypertension and tobacco abuse who presents to the ED with c/o right forearm skin tear that occurred around 3:00 this morning when she got up to use the bathroom. States she caught the skin of her am on the metal portion of door frame.  Denies feeling lightheaded or dizzy and did not fall down.  She applied a dressing at home.  She is anticoagulated on Eliquis and states she had a lot of bleeding.  Denies bony pain. Has had skin tears in past but not to this extreme.     External (non-ED) record review:  no recent notes or visits.     Chronic or social conditions impacting care: n/a    ALLERGIES  Aspirin, Milnacipran, Nsaids, Amlodipine, Hydrochlorothiazide, and Sulfa antibiotics    PAST MEDICAL HISTORY  Active Ambulatory Problems     Diagnosis Date Noted    No Active Ambulatory Problems     Resolved Ambulatory Problems     Diagnosis Date Noted    No Resolved Ambulatory Problems     Past Medical History:   Diagnosis Date    COPD (chronic obstructive pulmonary disease)     Coronary artery disease     Depression     Hyperlipidemia     Hypertension        PAST SURGICAL HISTORY  Past Surgical History:   Procedure Laterality Date    ABDOMINAL SURGERY      CAROTID ENDARTERECTOMY      HYSTERECTOMY         FAMILY HISTORY  History reviewed. No pertinent family history.    SOCIAL HISTORY  Social History     Socioeconomic History    Marital status:    Tobacco Use    Smoking status: Every Day     Current packs/day: 0.50     Average packs/day: 0.5 packs/day for 51.2  years (25.6 ttl pk-yrs)     Types: Cigarettes     Start date: 1973       REVIEW OF SYSTEMS  Review of Systems    All systems reviewed and negative except for those discussed in HPI.     PHYSICAL EXAM    I have reviewed the triage vital signs and nursing notes.  Vitals:    03/23/24 1232   BP: 127/55   Pulse: 98   Resp: 18   Temp: 97.9 °F (36.6 °C)   SpO2: 98%     Physical Exam    GENERAL: not distressed  HENT: nares patent  EYES: no scleral icterus  NECK: no ROM limitations  CV: regular rhythm, regular rate  RESPIRATORY: normal effort  ABDOMEN: soft  : deferred  MUSCULOSKELETAL: no bony tenderness to right arm.       Skin tear to lower right forearm as indicated above.  There is large portion of missing skin.  Radial pulse intact.  Compartments soft.   NEURO: alert, moves all extremities, follows commands  SKIN: warm, dry    Wound Care    Date/Time: 3/23/2024 1:28 PM    Performed by: Barbara Norton APRN  Authorized by: Betito Gardner MD    Consent:     Consent obtained:  Verbal    Consent given by:  Patient    Risks, benefits, and alternatives were discussed: yes      Risks discussed:  Bleeding and pain    Alternatives discussed:  No treatment  Universal protocol:     Patient identity confirmed:  Verbally with patient and arm band  Sedation:     Sedation type:  None  Anesthesia:     Anesthesia method:  None  Procedure details:     Indications: open wounds      Indications comment:  Skin tear right forearm    Debridement performed: No    Skin layer closed with:     Wound care performed: cleaned with soap and water.  Dressing:     Dressing: Mepitel dressing placed and covered with Kerlix.    Wrapped with:  Bulky dressing  Post-procedure details:     Procedure completion:  Tolerated well, no immediate complications       PROGRESS, DATA ANALYSIS, CONSULTS AND MEDICAL DECISION MAKING    Please note that this section constitutes my independent interpretation of clinical data including lab results, radiology, EKG's.   This constitutes my independent professional opinion regarding differential diagnosis and management of this patient.  It may include any factors such as history from outside sources, review of external records, social determinants of health, management of medications, response to those treatments, and discussions with other providers.       Orders placed during this visit:  Orders Placed This Encounter   Procedures    Wound Care            Medical Decision Making  Risk  Prescription drug management.    Pt presents with bleeding skin tear sustained around 0300 this am.  She is on Eliquis.  We updated the Tdap.  No bony tenderness.  Wound care performed after cleaning the wound and Mepitel dressing placed, covered with gauze.  Wound care discussed and I will have her follow up with wound care.          DIAGNOSIS  Final diagnoses:   Skin tear of forearm without complication, right, initial encounter   Chronic anticoagulation          Medication List        New Prescriptions      cephalexin 500 MG capsule  Commonly known as: KEFLEX  Take 1 capsule by mouth 3 (Three) Times a Day for 7 days.               Where to Get Your Medications        These medications were sent to [facility]/pharmacy #[id number] - [place], [place] - [address] [place] [address] - [phone]  - [phone] [address] [place] [address]      Hours: 24-hours Phone: [phone]   cephalexin 500 MG capsule         FOLLOW-UP  [facility]  [address] [place] [id number] [phone]  Schedule an appointment as soon as possible for a visit in 4 days          Latest Documented Vital Signs:  As of 13:35 EDT  BP- 127/55 HR- 98 Temp- 97.9 °F (36.6 °C) (Oral) O2 sat- 98%    Appropriate PPE utilized throughout this patient encounter to include mask, if indicated, per current protocol. Hand hygiene was performed before donning PPE and after removal when leaving the room.    Please note that portions of this were  completed with a voice recognition program.     Note Disclaimer: At [facility], we believe that sharing information builds trust and better relationships. You are receiving this note because you are receiving care at [facility] or recently visited. It is possible you will see health information before a provider has talked with you about it. This kind of information can be easy to misunderstand. To help you fully understand what it means for your health, we urge you to discuss this note with your provider.               moderate assist (50% patients effort)

## 2025-02-05 NOTE — PROGRESS NOTE ADULT - PROBLEM SELECTOR PLAN 10
DVT ppx: on AC  Diet: Regular  Dispo: PT eval for rehab recs LOY.  10. D/w patient and ACP Odalys Denton. Pending LOY.
DVT ppx: on AC  Diet: Regular  Dispo: PT eval for rehab recs LOY.  10. D/w patient and ACP Odalys Denton. Pending LOY.

## 2025-02-05 NOTE — OCCUPATIONAL THERAPY INITIAL EVALUATION ADULT - ADDITIONAL COMMENTS
Pt lives in group home, 3 steps to enter, +elevator access (4th level), As per patient, independent with ADLs prior to admission, ambulated independently with rollator

## 2025-02-05 NOTE — PROGRESS NOTE ADULT - PROBLEM SELECTOR PLAN 3
BL femoral head avascular necrosis likely contributing to chronic pain  leg numbness and weakness unchanged from prior, worked up by Neuro in 2024  was recommended for rehab at [facility] 1/2025 and declined, now wants rehab  continue Flexeril and percocet. -iSTOP in chart.   continue benadryl prn  PT consult recs LOY.

## 2025-02-05 NOTE — OCCUPATIONAL THERAPY INITIAL EVALUATION ADULT - GENERAL OBSERVATIONS, REHAB EVAL
Upon entry, patient semi-supine in bed +IVL +purewick, patient agreeable to OT eval, cleared for OT evaluation as per ALEYDA Cerrato

## 2025-02-05 NOTE — OCCUPATIONAL THERAPY INITIAL EVALUATION ADULT - NSOTDMEREC_GEN_A_CORE
If home, Pt will need assist with all ADL and functional mobility, will require poly fly w/c, 3:1 commode Pt will require 3:1 commode due to Pt confined to single room with no access to bathroom. Pt owns rollator

## 2025-02-05 NOTE — PROGRESS NOTE ADULT - PROBLEM SELECTOR PLAN 1
likely due to constipation  pt states only 1 BM since admission  continue Naltrexone  -c/w Senna 2 tabs nightly PRN
likely due to constipation    pt says she is having BMs  continue Naltrexone  -c/w Senna 2 tabs nightly PRN
likely due to constipation  pt states only 1 BM since admission  continue Naltrexone  -c/w Senna 2 tabs nightly PRN

## 2025-02-05 NOTE — PROGRESS NOTE ADULT - SUBJECTIVE AND OBJECTIVE BOX
SUBJECTIVE / OVERNIGHT EVENTS:  Today is hospital day 2d. There are no new issues or overnight events.   Did not report any lightheadedness, vertigo, shortness of breathe, chest pain, palpitations, vomiting, diarrhea currently. + constipation    HPI:  39-year-old female past med history of factor V Leyden, endometriosis, diabetes, long psychiatric history of intellectual disability, bipolar disorder and schizoaffective disorder presenting for concerns of constipation.  In the ED she reported abdominal pain and constipation stating her stool was in small reena causing inability to walk. She reports intermittent numbness of her left leg which is chronic. She was hospitalized 2 weeks ago at [facility] and recommended for Rehab but declined. She now says she wants to go to rehab. Denies any chest pain, shortness of breath, fevers, chills, URI symptoms.  Denies any nausea vomiting diarrhea. She also reports diffuse pruritis and asks for benadryl.  (02 Feb 2025 09:13)    MEDICATIONS  (STANDING):  acetaminophen   IVPB .. 1000 milliGRAM(s) IV Intermittent once  apixaban 5 milliGRAM(s) Oral two times a day  cloNIDine 0.1 milliGRAM(s) Oral at bedtime  cyclobenzaprine 5 milliGRAM(s) Oral three times a day  diphenhydrAMINE 50 milliGRAM(s) Oral at bedtime  divalproex  milliGRAM(s) Oral two times a day  escitalopram 10 milliGRAM(s) Oral daily  levothyroxine 100 MICROGram(s) Oral daily  melatonin 3 milliGRAM(s) Oral at bedtime  naltrexone 50 milliGRAM(s) Oral daily  polyethylene glycol 3350 17 Gram(s) Oral daily  QUEtiapine 25 milliGRAM(s) Oral daily  QUEtiapine 200 milliGRAM(s) Oral at bedtime  risperiDONE   Tablet 3 milliGRAM(s) Oral at bedtime  rosuvastatin 5 milliGRAM(s) Oral at bedtime  sucralfate 1 Gram(s) Oral two times a day    MEDICATIONS  (PRN):  albuterol    90 MICROgram(s) HFA Inhaler 2 Puff(s) Inhalation every 6 hours PRN for shortness of breath and/or wheezing  aluminum hydroxide/magnesium hydroxide/simethicone Suspension 30 milliLiter(s) Oral every 4 hours PRN Dyspepsia  diphenhydrAMINE 50 milliGRAM(s) Oral every 6 hours PRN Rash and/or Itching  oxycodone    5 mG/acetaminophen 325 mG 1 Tablet(s) Oral every 8 hours PRN Severe Pain (7 - 10)  senna 2 Tablet(s) Oral at bedtime PRN Constipation  sucralfate 1 Gram(s) Oral every 6 hours PRN abdominal discomfort    HOME MEDICATIONS:  Albuterol (Eqv-Proventil HFA) 90 mcg/inh inhalation aerosol: 2 puff(s) inhaled every 6 hours as needed for  shortness of breath and/or wheezing  aluminum hydroxide-magnesium hydroxide 200 mg-200 mg/5 mL oral suspension: 30 milliliter(s) orally every 4 hours As needed Dyspepsia  apixaban 5 mg oral tablet: 1 tab(s) orally every 12 hours  Bactrim  mg-160 mg oral tablet: 1 tab(s) orally 2 times a day  Benadryl 50 mg oral capsule: 1 cap(s) orally every 6 hours as needed for  rash or itching  cloNIDine 0.1 mg oral tablet: 1 orally once a day (at bedtime)  Crestor 5 mg oral tablet: 1 tab(s) orally once a day  Depakote 250 mg oral delayed release tablet: 3 tab(s) orally 2 times a day  Flexeril 5 mg oral tablet: 1 tab(s) orally 3 times a day as needed for  moderate pain  levothyroxine 100 mcg (0.1 mg) oral tablet: 1 tab(s) orally once a day  Lexapro 10 mg oral tablet: 1 tab(s) orally once a day  melatonin 3 mg oral tablet: 1 tab(s) orally once a day (at bedtime)  naltrexone 50 mg oral tablet: 1 tab(s) orally once a day  oxycodone-acetaminophen 5 mg-325 mg oral tablet: 1 tab(s) orally every 8 hours as needed for Severe Pain (7 - 10) MDD: 3 tablets  QUEtiapine 200 mg oral tablet: 1 tab(s) orally once a day (at bedtime)  QUEtiapine 25 mg oral tablet: 1 tab(s) orally once a day  risperiDONE 3 mg oral tablet: 1 orally once a day (at bedtime)  sucralfate 1 g oral tablet: 1 tab(s) orally 2 times a day  sucralfate 1 g oral tablet: 1 tab(s) orally every 6 hours as needed for abdominal discomfort    PHYSICAL EXAM  Vital Signs Last 24 Hrs  T(C): 36.3 (04 Feb 2025 12:00), Max: 36.8 (03 Feb 2025 21:18)  T(F): 97.4 (04 Feb 2025 12:00), Max: 98.2 (03 Feb 2025 21:18)  HR: 101 (04 Feb 2025 12:00) (81 - 101)  BP: 114/79 (04 Feb 2025 12:00) (101/69 - 114/79)  BP(mean): --  RR: 18 (04 Feb 2025 12:00) (18 - 18)  SpO2: 100% (04 Feb 2025 12:00) (95% - 100%)    Parameters below as of 04 Feb 2025 12:00  Patient On (Oxygen Delivery Method): room air        02-03-25 @ 07:01  -  02-04-25 @ 07:00  --------------------------------------------------------  IN: 450 mL / OUT: 1450 mL / NET: -1000 mL    02-04-25 @ 07:01  -  02-04-25 @ 14:08  --------------------------------------------------------  IN: 900 mL / OUT: 1000 mL / NET: -100 mL      CONSTITUTIONAL: Well-groomed, in no apparent distress;  EYES: No conjunctival or scleral injection, non-icteric;  ENMT: No external nasal lesions; Normal outer ears;  NECK: Trachea midline;  RESPIRATORY: Normal respiratory effort;   CARDIOVASCULAR: Regular rate and rhythm;  GASTROINTESTINAL: Non-distended;  EXTREMITIES:  No lower extremity edema;  NEUROLOGY: Does respond to commands appropriately;  PSYCHIATRY: Mood and Affect appropriate    LABS:                        9.5    4.30  )-----------( 153      ( 03 Feb 2025 07:07 )             29.7     02-03    138  |  102  |  11  ----------------------------<  95  4.7   |  23  |  0.69    Ca    9.4      03 Feb 2025 07:08    TPro  6.3  /  Alb  3.8  /  TBili  0.1[L]  /  DBili  x   /  AST  17  /  ALT  27  /  AlkPhos  74  02-03          Urinalysis Basic - ( 03 Feb 2025 07:08 )    Color: x / Appearance: x / SG: x / pH: x  Gluc: 95 mg/dL / Ketone: x  / Bili: x / Urobili: x   Blood: x / Protein: x / Nitrite: x   Leuk Esterase: x / RBC: x / WBC x   Sq Epi: x / Non Sq Epi: x / Bacteria: x        Culture - Urine (collected 01 Feb 2025 23:01)  Source: Clean Catch Clean Catch (Midstream)  Final Report (02 Feb 2025 23:01):    <10,000 CFU/mL Normal Urogenital Samira      COVID-19 PCR: NotDetec (18 Sep 2024 16:09)      RADIOLOGY & ADDITIONAL TESTS:  EKG  12 Lead ECG:   Ventricular Rate 99 BPM    Atrial Rate 99 BPM    P-R Interval 154 ms    QRS Duration 86 ms    Q-T Interval 354 ms    QTC Calculation(Bazett) 454 ms    P Axis 52 degrees    R Axis 70 degrees    T Axis 28 degrees    Diagnosis Line NORMAL SINUS RHYTHM  NORMAL ECG  WHEN COMPARED WITH ECG OF 22-JUL-2024 05:58,  no  SIGNIFICANT CHANGES HAVE OCCURRED  Confirmed by JUSTYN KERR MD ([id number]) on 2/2/2025 12:37:19 PM (02-01-25 @ 21:24)  12 Lead ECG:   Ventricular Rate 90 BPM    Atrial Rate 90 BPM    P-R Interval 160 ms    QRS Duration 82 ms    Q-T Interval 398 ms    QTC Calculation(Bazett) 486 ms    P Axis 41 degrees    R Axis 51 degrees    T Axis 71 degrees    Diagnosis Line *** Critical Test Result: Long QTc  Normal sinus rhythm  Prolonged QT  Abnormal ECG (01-09-25 @ 20:57)    CT Angio Head w/ IV Cont:   ACC: [id number] EXAM:  CT ANGIO BRAIN (W)AW IC   ORDERED BY: JOZEF LINO     ACC: [id number] EXAM:  CT ANGIO NECK (W)AW IC   ORDERED BY: JOZEF LINO     PROCEDURE DATE:  01/09/2025          INTERPRETATION:  CLINICAL INFORMATION: L weakness    COMPARISON: CT head 11/19/2024, CTA head and neck 9/12/2024.    CONTRAST:  IV Contrast: Omnipaque 350 90 cc administered  10 cc discarded  .    TECHNIQUE:  CT BRAIN: Serial axial images were obtained from the skull base to the   vertex without the use of contrast. Post-contrast images were obtained   following CTA.    CTA NECK/HEAD: After the intravenous power injection of non-ionic   contrast material, serial thin sections were obtained through the   cervical and intracranial circulation on a multislice CT scanner. Axial,   Coronal and Sagittal MIP reformatted images were obtained. 3D   reconstruction was also performed.    FINDINGS:    CT BRAIN:    VENTRICLES AND SULCI: Normal in size and configuration.  INTRA-AXIAL: No mass effect, acute hemorrhage,or midline shift.  EXTRA-AXIAL: No mass or fluid collection. Basal cisterns are normal in   appearance.    VISUALIZED SINUSES:  Left maxillary sinus retention cyst. No air-fluid   levels.  TYMPANOMASTOID CAVITIES:  Clear.  VISUALIZED ORBITS: Normal.  CALVARIUM: Intact. Stable subcentimeter inferior clival bone island.      CTA NECK:    AORTIC ARCH AND VISUALIZED GREAT VESSELS: Within normal limits.    RIGHT:  COMMON CAROTID ARTERY: No significant stenosis to the carotid bifurcation.  INTERNAL CAROTID ARTERY: No significant stenosis based on NASCET criteria.  VERTEBRAL ARTERY: Normal in course and caliber to the intracranial   circulation.    LEFT:  COMMON CAROTID ARTERY: No significant stenosis to the carotid bifurcation.  INTERNAL CAROTID ARTERY: No significant stenosis based on NASCET criteria.  VERTEBRAL ARTERY: Normal in course and caliber to the intracranial   circulation.    VISUALIZED LUNGS: Clear.    MISCELLANEOUS: None.    CAROTID STENOSIS REFERENCE: Percent (%) stenosis is expressed in terms of   NASCET Criteria. (NASCET = 100x1-(N/D)). N=greatest narrowing. D=normal   distal diameter - MILD = <50% stenosis. - MODERATE = 50-69% stenosis. -   SEVERE = 70-89% stenosis. - HAIRLINE/CRITICAL = 90-99% stenosis. -   OCCLUDED = 100%stenosis.      CTA HEAD:    INTERNAL CAROTID ARTERIES: Bilateral petrous, precavernous, cavernous,   and supraclinoid regions are patent without significant stenosis.    [place] OF [place]: No aneurysm identified.    ANTERIOR CEREBRAL ARTERIES: No significant stenosis or occlusion.  MIDDLE CEREBRAL ARTERIES: No significant stenosis or occlusion.  POSTERIOR CEREBRAL ARTERIES: No significant stenosis or occlusion.    DISTAL VERTEBRAL / BASILAR ARTERIES: No significant stenosis or occlusion.    VENOUSSTRUCTURES: Visualized superficial and deep venous structures   appear patent.    MISCELLANEOUS: No other vascular abnormality is seen.      IMPRESSION:    CT HEAD:  No acute intracranial hemorrhage, mass effect, or midline shift.    CTA NECK:  No significant stenosis or occlusion.    CTA HEAD:  No large vessel occlusion, significant stenosis or vascular abnormality   identified.        --- End of Report ---          RASHMI MYLES MD; Resident Radiologist  This document has been electronically signed.  CHATA SOLOMON MD; Attending Radiologist  This document has been electronically signed. Jan 9 2025 10:44PM (01-09-25 @ 21:59)  CT Angio Neck w/ IV Cont:   ACC: [id number] EXAM:  CT ANGIO BRAIN (W)AW IC   ORDERED BY: JOZEF LINO     ACC: [id number] EXAM:  CT ANGIO NECK (W)AW IC   ORDERED BY: JOZEF LINO     PROCEDURE DATE:  01/09/2025          INTERPRETATION:  CLINICAL INFORMATION: L weakness    COMPARISON: CT head 11/19/2024, CTA head and neck 9/12/2024.    CONTRAST:  IV Contrast: Omnipaque 350 90 cc administered  10 cc discarded  .    TECHNIQUE:  CT BRAIN: Serial axial images were obtained from the skull base to the   vertex without the use of contrast. Post-contrast images were obtained   following CTA.    CTA NECK/HEAD: After the intravenous power injection of non-ionic   contrast material, serial thin sections were obtained through the   cervical and intracranial circulation on a multislice CT scanner. Axial,   Coronal and Sagittal MIP reformatted images were obtained. 3D   reconstruction was also performed.    FINDINGS:    CT BRAIN:    VENTRICLES AND SULCI: Normal in size and configuration.  INTRA-AXIAL: No mass effect, acute hemorrhage,or midline shift.  EXTRA-AXIAL: No mass or fluid collection. Basal cisterns are normal in   appearance.    VISUALIZED SINUSES:  Left maxillary sinus retention cyst. No air-fluid   levels.  TYMPANOMASTOID CAVITIES:  Clear.  VISUALIZED ORBITS: Normal.  CALVARIUM: Intact. Stable subcentimeter inferior clival bone island.      CTA NECK:    AORTIC ARCH AND VISUALIZED GREAT VESSELS: Within normal limits.    RIGHT:  COMMON CAROTID ARTERY: No significant stenosis to the carotid bifurcation.  INTERNAL CAROTID ARTERY: No significant stenosis based on NASCET criteria.  VERTEBRAL ARTERY: Normal in course and caliber to the intracranial   circulation.    LEFT:  COMMON CAROTID ARTERY: No significant stenosis to the carotid bifurcation.  INTERNAL CAROTID ARTERY: No significant stenosis based on NASCET criteria.  VERTEBRAL ARTERY: Normal in course and caliber to the intracranial   circulation.    VISUALIZED LUNGS: Clear.    MISCELLANEOUS: None.    CAROTID STENOSIS REFERENCE: Percent (%) stenosis is expressed in terms of   NASCET Criteria. (NASCET = 100x1-(N/D)). N=greatest narrowing. D=normal   distal diameter - MILD = <50% stenosis. - MODERATE = 50-69% stenosis. -   SEVERE = 70-89% stenosis. - HAIRLINE/CRITICAL = 90-99% stenosis. -   OCCLUDED = 100%stenosis.      CTA HEAD:    INTERNAL CAROTID ARTERIES: Bilateral petrous, precavernous, cavernous,   and supraclinoid regions are patent without significant stenosis.    [place]: No aneurysm identified.    ANTERIOR CEREBRAL ARTERIES: No significant stenosis or occlusion.  MIDDLE CEREBRAL ARTERIES: No significant stenosis or occlusion.  POSTERIOR CEREBRAL ARTERIES: No significant stenosis or occlusion.    DISTAL VERTEBRAL / BASILAR ARTERIES: No significant stenosis or occlusion.    VENOUSSTRUCTURES: Visualized superficial and deep venous structures   appear patent.    MISCELLANEOUS: No other vascular abnormality is seen.      IMPRESSION:    CT HEAD:  No acute intracranial hemorrhage, mass effect, or midline shift.    CTA NECK:  No significant stenosis or occlusion.    CTA HEAD:  No large vessel occlusion, significant stenosis or vascular abnormality   identified.        --- End of Report ---          RASHMI MYLES MD; Resident Radiologist  This document has been electronically signed.  CHATA SOLOMON MD; Attending Radiologist  This document has been electronically signed. Jan 9 2025 10:44PM (01-09-25 @ 21:59)  Xray Chest 1 View- PORTABLE-Urgent:   ACC: [id number] EXAM:  XR CHEST PORTABLE URGENT 1V   ORDERED BY: LEIGH STILES     PROCEDURE DATE:  01/09/2025          INTERPRETATION:  EXAMINATION: XR CHEST URGENT    CLINICAL INDICATION: Shortness of breath    TECHNIQUE: Single frontal, portable view of the chest was obtained.    COMPARISON: Chest x-ray 12/31/2024    FINDINGS:  The heart is unchanged in size.  No focal consolidation.  There is no pneumothorax or pleural effusion.    IMPRESSION:  No focal consolidation.    --- End of Report ---          FRANCISCO JAVIER SEGUNDO MD; Resident Radiologist  This document has been electronically signed.  CHATA TURCIOS MD; Attending Radiologist  This document has been electronically signed. James 10 2025  9:27AM (01-09-25 @ 21:14)  
Patient is a 39y old  Female who presents with a chief complaint of abd pain      SUBJECTIVE / OVERNIGHT EVENTS: reports mild right thigh pain.       MEDICATIONS  (STANDING):  apixaban 5 milliGRAM(s) Oral two times a day  cloNIDine 0.1 milliGRAM(s) Oral at bedtime  cyclobenzaprine 5 milliGRAM(s) Oral three times a day  diphenhydrAMINE 50 milliGRAM(s) Oral at bedtime  divalproex  milliGRAM(s) Oral two times a day  escitalopram 10 milliGRAM(s) Oral daily  levothyroxine 100 MICROGram(s) Oral daily  melatonin 3 milliGRAM(s) Oral at bedtime  naltrexone 50 milliGRAM(s) Oral daily  QUEtiapine 200 milliGRAM(s) Oral at bedtime  QUEtiapine 25 milliGRAM(s) Oral daily  risperiDONE   Tablet 3 milliGRAM(s) Oral at bedtime  rosuvastatin 5 milliGRAM(s) Oral at bedtime  sucralfate 1 Gram(s) Oral two times a day    MEDICATIONS  (PRN):  albuterol    90 MICROgram(s) HFA Inhaler 2 Puff(s) Inhalation every 6 hours PRN for shortness of breath and/or wheezing  aluminum hydroxide/magnesium hydroxide/simethicone Suspension 30 milliLiter(s) Oral every 4 hours PRN Dyspepsia  diphenhydrAMINE 50 milliGRAM(s) Oral every 6 hours PRN Rash and/or Itching  oxycodone    5 mG/acetaminophen 325 mG 1 Tablet(s) Oral every 8 hours PRN Severe Pain (7 - 10)  senna 2 Tablet(s) Oral at bedtime PRN Constipation  sucralfate 1 Gram(s) Oral every 6 hours PRN abdominal discomfort      Vital Signs Last 24 Hrs  T(C): 36.8 (03 Feb 2025 12:00), Max: 36.8 (02 Feb 2025 20:39)  T(F): 98.2 (03 Feb 2025 12:00), Max: 98.2 (02 Feb 2025 20:39)  HR: 103 (03 Feb 2025 12:00) (92 - 103)  BP: 104/72 (03 Feb 2025 12:00) (104/72 - 119/76)  BP(mean): --  RR: 18 (03 Feb 2025 12:00) (18 - 18)  SpO2: 98% (03 Feb 2025 12:00) (97% - 98%)    Parameters below as of 03 Feb 2025 12:00  Patient On (Oxygen Delivery Method): room air      CAPILLARY BLOOD GLUCOSE        I&O's Summary    02 Feb 2025 07:01  -  03 Feb 2025 07:00  --------------------------------------------------------  IN: 240 mL / OUT: 1200 mL / NET: -960 mL          PHYSICAL EXAM:   GENERAL: NAD, well-developed  HEAD:  Atraumatic, Normocephalic  EYES:  conjunctiva and sclera clear  NECK: Supple,   CHEST/LUNG: Clear to auscultation bilaterally; No wheeze  HEART: S1S2 normal. Regular rate and rhythm; No murmurs, rubs, or gallops  ABDOMEN: Soft, Nontender, Nondistended; Bowel sounds present  EXTREMITIES:   No clubbing, cyanosis, or edema  PSYCH/Neuro: AAOx3. Non-focal.   SKIN: No rashes or lesions      LABS:                        9.5    4.30  )-----------( 153      ( 03 Feb 2025 07:07 )             29.7     02-03    138  |  102  |  11  ----------------------------<  95  4.7   |  23  |  0.69    Ca    9.4      03 Feb 2025 07:08  Phos  3.1     02-01  Mg     1.8     02-01    TPro  6.3  /  Alb  3.8  /  TBili  0.1[L]  /  DBili  x   /  AST  17  /  ALT  27  /  AlkPhos  74  02-03    PT/INR - ( 01 Feb 2025 22:02 )   PT: 11.6 sec;   INR: 1.01 ratio         PTT - ( 01 Feb 2025 22:02 )  PTT:25.6 sec      Urinalysis Basic - ( 03 Feb 2025 07:08 )    Color: x / Appearance: x / SG: x / pH: x  Gluc: 95 mg/dL / Ketone: x  / Bili: x / Urobili: x   Blood: x / Protein: x / Nitrite: x   Leuk Esterase: x / RBC: x / WBC x   Sq Epi: x / Non Sq Epi: x / Bacteria: x      < from: CT Abdomen and Pelvis w/ IV Cont (02.01.25 @ 23:52) >  IMPRESSION:  No acute findings.    < end of copied text >      RADIOLOGY & ADDITIONAL TESTS:    Imaging Personally Reviewed: ct a/p report  Consultant(s) Notes Reviewed:    Care Discussed with Consultants/Other Providers:  
SUBJECTIVE / OVERNIGHT EVENTS:  Today is hospital day 3d. There are no new issues or overnight events.   Did not report any lightheadedness, vertigo, shortness of breathe, chest pain, palpitations, vomiting, diarrhea currently    HPI:  39-year-old female past med history of factor V Leyden, endometriosis, diabetes, long psychiatric history of intellectual disability, bipolar disorder and schizoaffective disorder presenting for concerns of constipation.  In the ED she reported abdominal pain and constipation stating her stool was in small reena causing inability to walk. She reports intermittent numbness of her left leg which is chronic. She was hospitalized 2 weeks ago at [facility] and recommended for Rehab but declined. She now says she wants to go to rehab. Denies any chest pain, shortness of breath, fevers, chills, URI symptoms.  Denies any nausea vomiting diarrhea. She also reports diffuse pruritis and asks for benadryl.  (02 Feb 2025 09:13)    MEDICATIONS  (STANDING):  acetaminophen   IVPB .. 1000 milliGRAM(s) IV Intermittent once  apixaban 5 milliGRAM(s) Oral two times a day  cloNIDine 0.1 milliGRAM(s) Oral at bedtime  cyclobenzaprine 5 milliGRAM(s) Oral three times a day  diphenhydrAMINE 50 milliGRAM(s) Oral at bedtime  divalproex  milliGRAM(s) Oral two times a day  escitalopram 10 milliGRAM(s) Oral daily  gabapentin 100 milliGRAM(s) Oral three times a day  levothyroxine 100 MICROGram(s) Oral daily  melatonin 3 milliGRAM(s) Oral at bedtime  naltrexone 50 milliGRAM(s) Oral daily  polyethylene glycol 3350 17 Gram(s) Oral daily  QUEtiapine 200 milliGRAM(s) Oral at bedtime  QUEtiapine 25 milliGRAM(s) Oral daily  risperiDONE   Tablet 3 milliGRAM(s) Oral at bedtime  rosuvastatin 5 milliGRAM(s) Oral at bedtime  sucralfate 1 Gram(s) Oral two times a day    MEDICATIONS  (PRN):  albuterol    90 MICROgram(s) HFA Inhaler 2 Puff(s) Inhalation every 6 hours PRN for shortness of breath and/or wheezing  aluminum hydroxide/magnesium hydroxide/simethicone Suspension 30 milliLiter(s) Oral every 4 hours PRN Dyspepsia  diphenhydrAMINE 50 milliGRAM(s) Oral every 6 hours PRN Rash and/or Itching  oxycodone    5 mG/acetaminophen 325 mG 1 Tablet(s) Oral every 8 hours PRN Severe Pain (7 - 10)  senna 2 Tablet(s) Oral at bedtime PRN Constipation  sucralfate 1 Gram(s) Oral every 6 hours PRN abdominal discomfort    HOME MEDICATIONS:  Albuterol (Eqv-Proventil HFA) 90 mcg/inh inhalation aerosol: 2 puff(s) inhaled every 6 hours as needed for  shortness of breath and/or wheezing  aluminum hydroxide-magnesium hydroxide 200 mg-200 mg/5 mL oral suspension: 30 milliliter(s) orally every 4 hours As needed Dyspepsia  apixaban 5 mg oral tablet: 1 tab(s) orally every 12 hours  Bactrim  mg-160 mg oral tablet: 1 tab(s) orally 2 times a day  Benadryl 50 mg oral capsule: 1 cap(s) orally every 6 hours as needed for  rash or itching  cloNIDine 0.1 mg oral tablet: 1 orally once a day (at bedtime)  Crestor 5 mg oral tablet: 1 tab(s) orally once a day  Depakote 250 mg oral delayed release tablet: 3 tab(s) orally 2 times a day  Flexeril 5 mg oral tablet: 1 tab(s) orally 3 times a day as needed for  moderate pain  levothyroxine 100 mcg (0.1 mg) oral tablet: 1 tab(s) orally once a day  Lexapro 10 mg oral tablet: 1 tab(s) orally once a day  melatonin 3 mg oral tablet: 1 tab(s) orally once a day (at bedtime)  naltrexone 50 mg oral tablet: 1 tab(s) orally once a day  oxycodone-acetaminophen 5 mg-325 mg oral tablet: 1 tab(s) orally every 8 hours as needed for Severe Pain (7 - 10) MDD: 3 tablets  QUEtiapine 200 mg oral tablet: 1 tab(s) orally once a day (at bedtime)  QUEtiapine 25 mg oral tablet: 1 tab(s) orally once a day  risperiDONE 3 mg oral tablet: 1 orally once a day (at bedtime)  sucralfate 1 g oral tablet: 1 tab(s) orally 2 times a day  sucralfate 1 g oral tablet: 1 tab(s) orally every 6 hours as needed for abdominal discomfort    PHYSICAL EXAM  Vital Signs Last 24 Hrs  T(C): 36.9 (05 Feb 2025 13:02), Max: 36.9 (05 Feb 2025 13:02)  T(F): 98.5 (05 Feb 2025 13:02), Max: 98.5 (05 Feb 2025 13:02)  HR: 94 (05 Feb 2025 13:02) (80 - 98)  BP: 103/72 (05 Feb 2025 13:02) (103/72 - 146/87)  BP(mean): --  RR: 18 (05 Feb 2025 13:02) (18 - 18)  SpO2: 97% (05 Feb 2025 13:02) (97% - 99%)    Parameters below as of 05 Feb 2025 13:02  Patient On (Oxygen Delivery Method): room air        02-04-25 @ 07:01  -  02-05-25 @ 07:00  --------------------------------------------------------  IN: 1050 mL / OUT: 1800 mL / NET: -750 mL    02-05-25 @ 07:01  -  02-05-25 @ 15:27  --------------------------------------------------------  IN: 720 mL / OUT: 800 mL / NET: -80 mL      CONSTITUTIONAL: Well-groomed, in no apparent distress;  EYES: No conjunctival or scleral injection, non-icteric;  ENMT: No external nasal lesions; Normal outer ears;  NECK: Trachea midline;  RESPIRATORY: Normal respiratory effort;   CARDIOVASCULAR: Regular rate and rhythm;  GASTROINTESTINAL: Non-distended;   EXTREMITIES:  No lower extremity edema;  NEUROLOGY: Does respond to commands appropriately;  PSYCHIATRY: Mood and Affect appropriate    LABS:                    COVID-19 PCR: NotDetec (18 Sep 2024 16:09)      RADIOLOGY & ADDITIONAL TESTS:  EKG  12 Lead ECG:   Ventricular Rate 99 BPM    Atrial Rate 99 BPM    P-R Interval 154 ms    QRS Duration 86 ms    Q-T Interval 354 ms    QTC Calculation(Bazett) 454 ms    P Axis 52 degrees    R Axis 70 degrees    T Axis 28 degrees    Diagnosis Line NORMAL SINUS RHYTHM  NORMAL ECG  WHEN COMPARED WITH ECG OF 22-JUL-2024 05:58,  no  SIGNIFICANT CHANGES HAVE OCCURRED  Confirmed by JUSTYN KERR MD ([id number]) on 2/2/2025 12:37:19 PM (02-01-25 @ 21:24)  12 Lead ECG:   Ventricular Rate 90 BPM    Atrial Rate 90 BPM    P-R Interval 160 ms    QRS Duration 82 ms    Q-T Interval 398 ms    QTC Calculation(Bazett) 486 ms    P Axis 41 degrees    R Axis 51 degrees    T Axis 71 degrees    Diagnosis Line *** Critical Test Result: Long QTc  Normal sinus rhythm  Prolonged QT  Abnormal ECG (01-09-25 @ 20:57)    CT Angio Head w/ IV Cont:   ACC: [id number] EXAM:  CT ANGIO BRAIN (W)AW IC   ORDERED BY: JOZEF LINO     ACC: [id number] EXAM:  CT ANGIO NECK (W)AW IC   ORDERED BY: JOZEF LINO     PROCEDURE DATE:  01/09/2025          INTERPRETATION:  CLINICAL INFORMATION: L weakness    COMPARISON: CT head 11/19/2024, CTA head and neck 9/12/2024.    CONTRAST:  IV Contrast: Omnipaque 350 90 cc administered  10 cc discarded  .    TECHNIQUE:  CT BRAIN: Serial axial images were obtained from the skull base to the   vertex without the use of contrast. Post-contrast images were obtained   following CTA.    CTA NECK/HEAD: After the intravenous power injection of non-ionic   contrast material, serial thin sections were obtained through the   cervical and intracranial circulation on a multislice CT scanner. Axial,   Coronal and Sagittal MIP reformatted images were obtained. 3D   reconstruction was also performed.    FINDINGS:    CT BRAIN:    VENTRICLES AND SULCI: Normal in size and configuration.  INTRA-AXIAL: No mass effect, acute hemorrhage,or midline shift.  EXTRA-AXIAL: No mass or fluid collection. Basal cisterns are normal in   appearance.    VISUALIZED SINUSES:  Left maxillary sinus retention cyst. No air-fluid   levels.  TYMPANOMASTOID CAVITIES:  Clear.  VISUALIZED ORBITS: Normal.  CALVARIUM: Intact. Stable subcentimeter inferior clival bone island.      CTA NECK:    AORTIC ARCH AND VISUALIZED GREAT VESSELS: Within normal limits.    RIGHT:  COMMON CAROTID ARTERY: No significant stenosis to the carotid bifurcation.  INTERNAL CAROTID ARTERY: No significant stenosis based on NASCET criteria.  VERTEBRAL ARTERY: Normal in course and caliber to the intracranial   circulation.    LEFT:  COMMON CAROTID ARTERY: No significant stenosis to the carotid bifurcation.  INTERNAL CAROTID ARTERY: No significant stenosis based on NASCET criteria.  VERTEBRAL ARTERY: Normal in course and caliber to the intracranial   circulation.    VISUALIZED LUNGS: Clear.    MISCELLANEOUS: None.    CAROTID STENOSIS REFERENCE: Percent (%) stenosis is expressed in terms of   NASCET Criteria. (NASCET = 100x1-(N/D)). N=greatest narrowing. D=normal   distal diameter - MILD = <50% stenosis. - MODERATE = 50-69% stenosis. -   SEVERE = 70-89% stenosis. - HAIRLINE/CRITICAL = 90-99% stenosis. -   OCCLUDED = 100%stenosis.      CTA HEAD:    INTERNAL CAROTID ARTERIES: Bilateral petrous, precavernous, cavernous,   and supraclinoid regions are patent without significant stenosis.    [place]: No aneurysm identified.    ANTERIOR CEREBRAL ARTERIES: No significant stenosis or occlusion.  MIDDLE CEREBRAL ARTERIES: No significant stenosis or occlusion.  POSTERIOR CEREBRAL ARTERIES: No significant stenosis or occlusion.    DISTAL VERTEBRAL / BASILAR ARTERIES: No significant stenosis or occlusion.    VENOUSSTRUCTURES: Visualized superficial and deep venous structures   appear patent.    MISCELLANEOUS: No other vascular abnormality is seen.      IMPRESSION:    CT HEAD:  No acute intracranial hemorrhage, mass effect, or midline shift.    CTA NECK:  No significant stenosis or occlusion.    CTA HEAD:  No large vessel occlusion, significant stenosis or vascular abnormality   identified.        --- End of Report ---          RASHMI MYLES MD; Resident Radiologist  This document has been electronically signed.  CHATA SOLOMON MD; Attending Radiologist  This document has been electronically signed. Jan 9 2025 10:44PM (01-09-25 @ 21:59)  CT Angio Neck w/ IV Cont:   ACC: [id number] EXAM:  CT ANGIO BRAIN (W)AW IC   ORDERED BY: JOZEF LINO     ACC: [id number] EXAM:  CT ANGIO NECK (W)AW IC   ORDERED BY: JOZEF LINO     PROCEDURE DATE:  01/09/2025          INTERPRETATION:  CLINICAL INFORMATION: L weakness    COMPARISON: CT head 11/19/2024, CTA head and neck 9/12/2024.    CONTRAST:  IV Contrast: Omnipaque 350 90 cc administered  10 cc discarded  .    TECHNIQUE:  CT BRAIN: Serial axial images were obtained from the skull base to the   vertex without the use of contrast. Post-contrast images were obtained   following CTA.    CTA NECK/HEAD: After the intravenous power injection of non-ionic   contrast material, serial thin sections were obtained through the   cervical and intracranial circulation on a multislice CT scanner. Axial,   Coronal and Sagittal MIP reformatted images were obtained. 3D   reconstruction was also performed.    FINDINGS:    CT BRAIN:    VENTRICLES AND SULCI: Normal in size and configuration.  INTRA-AXIAL: No mass effect, acute hemorrhage,or midline shift.  EXTRA-AXIAL: No mass or fluid collection. Basal cisterns are normal in   appearance.    VISUALIZED SINUSES:  Left maxillary sinus retention cyst. No air-fluid   levels.  TYMPANOMASTOID CAVITIES:  Clear.  VISUALIZED ORBITS: Normal.  CALVARIUM: Intact. Stable subcentimeter inferior clival bone island.      CTA NECK:    AORTIC ARCH AND VISUALIZED GREAT VESSELS: Within normal limits.    RIGHT:  COMMON CAROTID ARTERY: No significant stenosis to the carotid bifurcation.  INTERNAL CAROTID ARTERY: No significant stenosis based on NASCET criteria.  VERTEBRAL ARTERY: Normal in course and caliber to the intracranial   circulation.    LEFT:  COMMON CAROTID ARTERY: No significant stenosis to the carotid bifurcation.  INTERNAL CAROTID ARTERY: No significant stenosis based on NASCET criteria.  VERTEBRAL ARTERY: Normal in course and caliber to the intracranial   circulation.    VISUALIZED LUNGS: Clear.    MISCELLANEOUS: None.    CAROTID STENOSIS REFERENCE: Percent (%) stenosis is expressed in terms of   NASCET Criteria. (NASCET = 100x1-(N/D)). N=greatest narrowing. D=normal   distal diameter - MILD = <50% stenosis. - MODERATE = 50-69% stenosis. -   SEVERE = 70-89% stenosis. - HAIRLINE/CRITICAL = 90-99% stenosis. -   OCCLUDED = 100%stenosis.      CTA HEAD:    INTERNAL CAROTID ARTERIES: Bilateral petrous, precavernous, cavernous,   and supraclinoid regions are patent without significant stenosis.    [place]: No aneurysm identified.    ANTERIOR CEREBRAL ARTERIES: No significant stenosis or occlusion.  MIDDLE CEREBRAL ARTERIES: No significant stenosis or occlusion.  POSTERIOR CEREBRAL ARTERIES: No significant stenosis or occlusion.    DISTAL VERTEBRAL / BASILAR ARTERIES: No significant stenosis or occlusion.    VENOUSSTRUCTURES: Visualized superficial and deep venous structures   appear patent.    MISCELLANEOUS: No other vascular abnormality is seen.      IMPRESSION:    CT HEAD:  No acute intracranial hemorrhage, mass effect, or midline shift.    CTA NECK:  No significant stenosis or occlusion.    CTA HEAD:  No large vessel occlusion, significant stenosis or vascular abnormality   identified.        --- End of Report ---          RASHMI MYLES MD; Resident Radiologist  This document has been electronically signed.  CHATA SOLOMON MD; Attending Radiologist  This document has been electronically signed. Jan 9 2025 10:44PM (01-09-25 @ 21:59)  Xray Chest 1 View- PORTABLE-Urgent:   ACC: [id number] EXAM:  XR CHEST PORTABLE URGENT 1V   ORDERED BY: LEIGH STILES     PROCEDURE DATE:  01/09/2025          INTERPRETATION:  EXAMINATION: XR CHEST URGENT    CLINICAL INDICATION: Shortness of breath    TECHNIQUE: Single frontal, portable view of the chest was obtained.    COMPARISON: Chest x-ray 12/31/2024    FINDINGS:  The heart is unchanged in size.  No focal consolidation.  There is no pneumothorax or pleural effusion.    IMPRESSION:  No focal consolidation.    --- End of Report ---          FRANCISCO JAVIER SEGUNDO MD; Resident Radiologist  This document has been electronically signed.  CHATA TURCIOS MD; Attending Radiologist  This document has been electronically signed. James 10 2025  9:27AM (01-09-25 @ 21:14)

## 2025-02-05 NOTE — PROGRESS NOTE ADULT - PROBLEM SELECTOR PLAN 2
improved with gabapentin per pt  flexeril not that helpful  cw gabapentin 100 mg TID  consult chronic pain before administering any new pain meds to prevent polypharmacy

## 2025-02-06 ENCOUNTER — TRANSCRIPTION ENCOUNTER (OUTPATIENT)
Age: 40
End: 2025-02-06

## 2025-02-06 VITALS
RESPIRATION RATE: 18 BRPM | HEART RATE: 100 BPM | DIASTOLIC BLOOD PRESSURE: 72 MMHG | SYSTOLIC BLOOD PRESSURE: 103 MMHG | OXYGEN SATURATION: 96 % | TEMPERATURE: 98 F

## 2025-02-06 LAB
FLUAV AG NPH QL: SIGNIFICANT CHANGE UP
FLUBV AG NPH QL: SIGNIFICANT CHANGE UP
RSV RNA NPH QL NAA+NON-PROBE: SIGNIFICANT CHANGE UP
SARS-COV-2 RNA SPEC QL NAA+PROBE: SIGNIFICANT CHANGE UP

## 2025-02-06 PROCEDURE — 85025 COMPLETE CBC W/AUTO DIFF WBC: CPT

## 2025-02-06 PROCEDURE — 83690 ASSAY OF LIPASE: CPT

## 2025-02-06 PROCEDURE — 84436 ASSAY OF TOTAL THYROXINE: CPT

## 2025-02-06 PROCEDURE — 84100 ASSAY OF PHOSPHORUS: CPT

## 2025-02-06 PROCEDURE — 83735 ASSAY OF MAGNESIUM: CPT

## 2025-02-06 PROCEDURE — 99239 HOSP IP/OBS DSCHRG MGMT >30: CPT

## 2025-02-06 PROCEDURE — 85730 THROMBOPLASTIN TIME PARTIAL: CPT

## 2025-02-06 PROCEDURE — 87637 SARSCOV2&INF A&B&RSV AMP PRB: CPT

## 2025-02-06 PROCEDURE — 80053 COMPREHEN METABOLIC PANEL: CPT

## 2025-02-06 PROCEDURE — 97161 PT EVAL LOW COMPLEX 20 MIN: CPT

## 2025-02-06 PROCEDURE — 84702 CHORIONIC GONADOTROPIN TEST: CPT

## 2025-02-06 PROCEDURE — 81025 URINE PREGNANCY TEST: CPT

## 2025-02-06 PROCEDURE — 97110 THERAPEUTIC EXERCISES: CPT

## 2025-02-06 PROCEDURE — 82550 ASSAY OF CK (CPK): CPT

## 2025-02-06 PROCEDURE — 81001 URINALYSIS AUTO W/SCOPE: CPT

## 2025-02-06 PROCEDURE — 84443 ASSAY THYROID STIM HORMONE: CPT

## 2025-02-06 PROCEDURE — 85610 PROTHROMBIN TIME: CPT

## 2025-02-06 PROCEDURE — 83036 HEMOGLOBIN GLYCOSYLATED A1C: CPT

## 2025-02-06 PROCEDURE — 99285 EMERGENCY DEPT VISIT HI MDM: CPT | Mod: 25

## 2025-02-06 PROCEDURE — 84480 ASSAY TRIIODOTHYRONINE (T3): CPT

## 2025-02-06 PROCEDURE — 80307 DRUG TEST PRSMV CHEM ANLYZR: CPT

## 2025-02-06 PROCEDURE — 80164 ASSAY DIPROPYLACETIC ACD TOT: CPT

## 2025-02-06 PROCEDURE — 97167 OT EVAL HIGH COMPLEX 60 MIN: CPT

## 2025-02-06 PROCEDURE — 97530 THERAPEUTIC ACTIVITIES: CPT

## 2025-02-06 PROCEDURE — 74177 CT ABD & PELVIS W/CONTRAST: CPT | Mod: MC

## 2025-02-06 PROCEDURE — 87086 URINE CULTURE/COLONY COUNT: CPT

## 2025-02-06 PROCEDURE — 96374 THER/PROPH/DIAG INJ IV PUSH: CPT

## 2025-02-06 RX ORDER — CLONIDINE HYDROCHLORIDE 0.2 MG/1
1 TABLET ORAL
Qty: 0 | Refills: 0 | DISCHARGE
Start: 2025-02-06

## 2025-02-06 RX ORDER — QUETIAPINE FUMARATE 300 MG/1
1 TABLET ORAL
Qty: 0 | Refills: 0 | DISCHARGE
Start: 2025-02-06

## 2025-02-06 RX ORDER — NALTREXONE HCL 50 MG
1 TABLET ORAL
Qty: 0 | Refills: 0 | DISCHARGE
Start: 2025-02-06

## 2025-02-06 RX ORDER — LEVOTHYROXINE SODIUM 25 UG/1
1 TABLET ORAL
Qty: 0 | Refills: 0 | DISCHARGE
Start: 2025-02-06

## 2025-02-06 RX ORDER — APIXABAN 5 MG/1
1 TABLET, FILM COATED ORAL
Qty: 0 | Refills: 0 | DISCHARGE
Start: 2025-02-06

## 2025-02-06 RX ORDER — ALBUTEROL SULFATE 90 UG/1
2 INHALANT RESPIRATORY (INHALATION)
Refills: 0 | DISCHARGE

## 2025-02-06 RX ORDER — SUCRALFATE 1 G/10ML
1 SUSPENSION ORAL
Refills: 0 | DISCHARGE

## 2025-02-06 RX ORDER — POLYETHYLENE GLYCOL 3350 17 G/17G
17 POWDER, FOR SOLUTION ORAL
Qty: 0 | Refills: 0 | DISCHARGE
Start: 2025-02-06

## 2025-02-06 RX ORDER — DIPHENHYDRAMINE HCL 25 MG
1 CAPSULE ORAL
Qty: 0 | Refills: 0 | DISCHARGE
Start: 2025-02-06

## 2025-02-06 RX ORDER — ACETAMINOPHEN, DIPHENHYDRAMINE HCL, PHENYLEPHRINE HCL 325; 25; 5 MG/1; MG/1; MG/1
1 TABLET ORAL
Qty: 0 | Refills: 0 | DISCHARGE
Start: 2025-02-06

## 2025-02-06 RX ORDER — SENNOSIDES 8.6 MG
2 TABLET ORAL
Qty: 0 | Refills: 0 | DISCHARGE
Start: 2025-02-06

## 2025-02-06 RX ORDER — GABAPENTIN 800 MG/1
2 TABLET ORAL
Qty: 0 | Refills: 0 | DISCHARGE
Start: 2025-02-06

## 2025-02-06 RX ORDER — SUCRALFATE 1 G/10ML
1 SUSPENSION ORAL
Qty: 0 | Refills: 0 | DISCHARGE
Start: 2025-02-06

## 2025-02-06 RX ORDER — MAGNESIUM, ALUMINUM HYDROXIDE 200-225/5
30 SUSPENSION, ORAL (FINAL DOSE FORM) ORAL
Qty: 0 | Refills: 0 | DISCHARGE
Start: 2025-02-06

## 2025-02-06 RX ORDER — DIVALPROEX SODIUM 500 MG
3 TABLET, DELAYED RELEASE (ENTERIC COATED) ORAL
Qty: 0 | Refills: 0 | DISCHARGE
Start: 2025-02-06

## 2025-02-06 RX ORDER — GABAPENTIN 800 MG/1
100 TABLET ORAL ONCE
Refills: 0 | Status: COMPLETED | OUTPATIENT
Start: 2025-02-06 | End: 2025-02-06

## 2025-02-06 RX ORDER — ROSUVASTATIN 40 MG/1
1 TABLET, FILM COATED ORAL
Refills: 0 | DISCHARGE

## 2025-02-06 RX ORDER — ALBUTEROL 90 MCG
2 AEROSOL REFILL (GRAM) INHALATION
Qty: 0 | Refills: 0 | DISCHARGE
Start: 2025-02-06

## 2025-02-06 RX ORDER — RISPERIDONE 3 MG
1 TABLET ORAL
Qty: 0 | Refills: 0 | DISCHARGE
Start: 2025-02-06

## 2025-02-06 RX ORDER — ROSUVASTATIN CALCIUM 10 MG/1
1 TABLET, FILM COATED ORAL
Qty: 0 | Refills: 0 | DISCHARGE
Start: 2025-02-06

## 2025-02-06 RX ORDER — DIPHENHYDRAMINE HCL 25 MG
1 TABLET ORAL
Refills: 0 | DISCHARGE

## 2025-02-06 RX ORDER — ESCITALOPRAM 10 MG/1
1 TABLET, FILM COATED ORAL
Qty: 0 | Refills: 0 | DISCHARGE
Start: 2025-02-06

## 2025-02-06 RX ADMIN — Medication 750 MILLIGRAM(S): at 17:26

## 2025-02-06 RX ADMIN — SUCRALFATE 1 GRAM(S): 1 SUSPENSION ORAL at 17:26

## 2025-02-06 RX ADMIN — MORPHINE SULFATE 2 MILLIGRAM(S): 60 TABLET, FILM COATED, EXTENDED RELEASE ORAL at 07:33

## 2025-02-06 RX ADMIN — Medication 5 MILLIGRAM(S): at 12:22

## 2025-02-06 RX ADMIN — ACETAMINOPHEN 975 MILLIGRAM(S): 160 SUSPENSION ORAL at 07:33

## 2025-02-06 RX ADMIN — LEVOTHYROXINE SODIUM 100 MICROGRAM(S): 25 TABLET ORAL at 06:12

## 2025-02-06 RX ADMIN — GABAPENTIN 200 MILLIGRAM(S): 800 TABLET ORAL at 06:12

## 2025-02-06 RX ADMIN — OXYCODONE HYDROCHLORIDE 5 MILLIGRAM(S): 30 TABLET ORAL at 07:33

## 2025-02-06 RX ADMIN — Medication 50 MILLIGRAM(S): at 12:22

## 2025-02-06 RX ADMIN — Medication 5 MILLIGRAM(S): at 06:13

## 2025-02-06 RX ADMIN — QUETIAPINE FUMARATE 25 MILLIGRAM(S): 300 TABLET ORAL at 12:22

## 2025-02-06 RX ADMIN — ESCITALOPRAM 10 MILLIGRAM(S): 10 TABLET, FILM COATED ORAL at 12:23

## 2025-02-06 RX ADMIN — Medication 750 MILLIGRAM(S): at 06:12

## 2025-02-06 RX ADMIN — POLYETHYLENE GLYCOL 3350 17 GRAM(S): 17 POWDER, FOR SOLUTION ORAL at 12:22

## 2025-02-06 RX ADMIN — APIXABAN 5 MILLIGRAM(S): 5 TABLET, FILM COATED ORAL at 17:27

## 2025-02-06 RX ADMIN — SUCRALFATE 1 GRAM(S): 1 SUSPENSION ORAL at 06:12

## 2025-02-06 RX ADMIN — GABAPENTIN 100 MILLIGRAM(S): 800 TABLET ORAL at 17:38

## 2025-02-06 RX ADMIN — APIXABAN 5 MILLIGRAM(S): 5 TABLET, FILM COATED ORAL at 06:12

## 2025-02-06 RX ADMIN — GABAPENTIN 200 MILLIGRAM(S): 800 TABLET ORAL at 12:22

## 2025-02-06 RX ADMIN — Medication 50 MILLIGRAM(S): at 06:41

## 2025-02-06 NOTE — DISCHARGE NOTE NURSING/CASE MANAGEMENT/SOCIAL WORK - FINANCIAL ASSISTANCE
[facility] provides services at a reduced cost to those who are determined to be eligible through [facility]’s financial assistance program. Information regarding [facility]’s financial assistance program can be found by going to [email or web address] or by calling [phone].

## 2025-02-06 NOTE — DISCHARGE NOTE NURSING/CASE MANAGEMENT/SOCIAL WORK - PATIENT PORTAL LINK FT
You can access the FollowMyHealth Patient Portal offered by [facility] by registering at the following website: [email or web address]. By joining [employer]’s FollowMyHealth portal, you will also be able to view your health information using other applications (apps) compatible with our system.

## 2025-02-15 ENCOUNTER — EMERGENCY (EMERGENCY)
Facility: HOSPITAL | Age: 40
LOS: 1 days | Discharge: NURSING, REHAB OR LONG-TERM CARE | End: 2025-02-15
Attending: EMERGENCY MEDICINE
Payer: MEDICARE

## 2025-02-15 ENCOUNTER — NON-APPOINTMENT (OUTPATIENT)
Age: 40
End: 2025-02-15

## 2025-02-15 VITALS
OXYGEN SATURATION: 96 % | HEIGHT: 68 IN | SYSTOLIC BLOOD PRESSURE: 111 MMHG | RESPIRATION RATE: 20 BRPM | WEIGHT: 293 LBS | TEMPERATURE: 98 F | DIASTOLIC BLOOD PRESSURE: 78 MMHG | HEART RATE: 110 BPM

## 2025-02-15 DIAGNOSIS — Z90.49 ACQUIRED ABSENCE OF OTHER SPECIFIED PARTS OF DIGESTIVE TRACT: Chronic | ICD-10-CM

## 2025-02-15 LAB
ALBUMIN SERPL ELPH-MCNC: 4.2 G/DL — SIGNIFICANT CHANGE UP (ref 3.3–5)
ALP SERPL-CCNC: 78 U/L — SIGNIFICANT CHANGE UP (ref 40–120)
ALT FLD-CCNC: 36 U/L — SIGNIFICANT CHANGE UP (ref 10–45)
ANION GAP SERPL CALC-SCNC: 14 MMOL/L — SIGNIFICANT CHANGE UP (ref 5–17)
ANISOCYTOSIS BLD QL: SLIGHT — SIGNIFICANT CHANGE UP
AST SERPL-CCNC: 22 U/L — SIGNIFICANT CHANGE UP (ref 10–40)
BASOPHILS # BLD AUTO: 0 K/UL — SIGNIFICANT CHANGE UP (ref 0–0.2)
BASOPHILS NFR BLD AUTO: 0 % — SIGNIFICANT CHANGE UP (ref 0–2)
BILIRUB SERPL-MCNC: 0.2 MG/DL — SIGNIFICANT CHANGE UP (ref 0.2–1.2)
BUN SERPL-MCNC: 14 MG/DL — SIGNIFICANT CHANGE UP (ref 7–23)
CALCIUM SERPL-MCNC: 9.7 MG/DL — SIGNIFICANT CHANGE UP (ref 8.4–10.5)
CHLORIDE SERPL-SCNC: 102 MMOL/L — SIGNIFICANT CHANGE UP (ref 96–108)
CO2 SERPL-SCNC: 25 MMOL/L — SIGNIFICANT CHANGE UP (ref 22–31)
CREAT SERPL-MCNC: 0.8 MG/DL — SIGNIFICANT CHANGE UP (ref 0.5–1.3)
DACRYOCYTES BLD QL SMEAR: SLIGHT — SIGNIFICANT CHANGE UP
EGFR: 97 ML/MIN/1.73M2 — SIGNIFICANT CHANGE UP
ELLIPTOCYTES BLD QL SMEAR: SLIGHT — SIGNIFICANT CHANGE UP
EOSINOPHIL # BLD AUTO: 0.22 K/UL — SIGNIFICANT CHANGE UP (ref 0–0.5)
EOSINOPHIL NFR BLD AUTO: 3.5 % — SIGNIFICANT CHANGE UP (ref 0–6)
FLUAV AG NPH QL: SIGNIFICANT CHANGE UP
FLUBV AG NPH QL: SIGNIFICANT CHANGE UP
GAS PNL BLDV: SIGNIFICANT CHANGE UP
GLUCOSE SERPL-MCNC: 130 MG/DL — HIGH (ref 70–99)
HCG SERPL-ACNC: <2 MIU/ML — SIGNIFICANT CHANGE UP
HCT VFR BLD CALC: 33 % — LOW (ref 34.5–45)
HGB BLD-MCNC: 10.3 G/DL — LOW (ref 11.5–15.5)
HYPOCHROMIA BLD QL: SLIGHT — SIGNIFICANT CHANGE UP
LIDOCAIN IGE QN: 38 U/L — SIGNIFICANT CHANGE UP (ref 7–60)
LYMPHOCYTES # BLD AUTO: 2.3 K/UL — SIGNIFICANT CHANGE UP (ref 1–3.3)
LYMPHOCYTES # BLD AUTO: 36.5 % — SIGNIFICANT CHANGE UP (ref 13–44)
MACROCYTES BLD QL: SLIGHT — SIGNIFICANT CHANGE UP
MANUAL SMEAR VERIFICATION: SIGNIFICANT CHANGE UP
MCHC RBC-ENTMCNC: 29.8 PG — SIGNIFICANT CHANGE UP (ref 27–34)
MCHC RBC-ENTMCNC: 31.2 G/DL — LOW (ref 32–36)
MCV RBC AUTO: 95.4 FL — SIGNIFICANT CHANGE UP (ref 80–100)
METAMYELOCYTES # FLD: 0.9 % — HIGH (ref 0–0)
METAMYELOCYTES NFR BLD: 0.9 % — HIGH (ref 0–0)
MONOCYTES # BLD AUTO: 0.43 K/UL — SIGNIFICANT CHANGE UP (ref 0–0.9)
MONOCYTES NFR BLD AUTO: 6.9 % — SIGNIFICANT CHANGE UP (ref 2–14)
NEUTROPHILS # BLD AUTO: 3.29 K/UL — SIGNIFICANT CHANGE UP (ref 1.8–7.4)
NEUTROPHILS NFR BLD AUTO: 52.2 % — SIGNIFICANT CHANGE UP (ref 43–77)
OVALOCYTES BLD QL SMEAR: SLIGHT — SIGNIFICANT CHANGE UP
PLAT MORPH BLD: NORMAL — SIGNIFICANT CHANGE UP
PLATELET # BLD AUTO: 136 K/UL — LOW (ref 150–400)
POIKILOCYTOSIS BLD QL AUTO: SLIGHT — SIGNIFICANT CHANGE UP
POLYCHROMASIA BLD QL SMEAR: SLIGHT — SIGNIFICANT CHANGE UP
POTASSIUM SERPL-MCNC: 4 MMOL/L — SIGNIFICANT CHANGE UP (ref 3.5–5.3)
POTASSIUM SERPL-SCNC: 4 MMOL/L — SIGNIFICANT CHANGE UP (ref 3.5–5.3)
PROT SERPL-MCNC: 7.5 G/DL — SIGNIFICANT CHANGE UP (ref 6–8.3)
RBC # BLD: 3.46 M/UL — LOW (ref 3.8–5.2)
RBC # FLD: 15.8 % — HIGH (ref 10.3–14.5)
RBC BLD AUTO: ABNORMAL
RSV RNA NPH QL NAA+NON-PROBE: SIGNIFICANT CHANGE UP
SARS-COV-2 RNA SPEC QL NAA+PROBE: SIGNIFICANT CHANGE UP
SCHISTOCYTES BLD QL AUTO: SLIGHT — SIGNIFICANT CHANGE UP
SODIUM SERPL-SCNC: 141 MMOL/L — SIGNIFICANT CHANGE UP (ref 135–145)
WBC # BLD: 6.3 K/UL — SIGNIFICANT CHANGE UP (ref 3.8–10.5)
WBC # FLD AUTO: 6.3 K/UL — SIGNIFICANT CHANGE UP (ref 3.8–10.5)

## 2025-02-15 PROCEDURE — 74176 CT ABD & PELVIS W/O CONTRAST: CPT | Mod: 26

## 2025-02-15 PROCEDURE — 99284 EMERGENCY DEPT VISIT MOD MDM: CPT | Mod: GC

## 2025-02-15 PROCEDURE — 76937 US GUIDE VASCULAR ACCESS: CPT | Mod: 26

## 2025-02-15 PROCEDURE — 71046 X-RAY EXAM CHEST 2 VIEWS: CPT | Mod: 26

## 2025-02-15 RX ORDER — ACETAMINOPHEN 160 MG/5ML
1000 SUSPENSION ORAL ONCE
Refills: 0 | Status: COMPLETED | OUTPATIENT
Start: 2025-02-15 | End: 2025-02-15

## 2025-02-15 RX ORDER — QUETIAPINE FUMARATE 300 MG/1
200 TABLET ORAL ONCE
Refills: 0 | Status: COMPLETED | OUTPATIENT
Start: 2025-02-15 | End: 2025-02-15

## 2025-02-15 RX ORDER — DIPHENHYDRAMINE HCL 25 MG
50 CAPSULE ORAL ONCE
Refills: 0 | Status: COMPLETED | OUTPATIENT
Start: 2025-02-15 | End: 2025-02-15

## 2025-02-15 RX ORDER — GABAPENTIN 800 MG/1
100 TABLET ORAL ONCE
Refills: 0 | Status: COMPLETED | OUTPATIENT
Start: 2025-02-15 | End: 2025-02-15

## 2025-02-15 RX ORDER — BACTERIOSTATIC SODIUM CHLORIDE 0.9 %
1000 VIAL (ML) INJECTION ONCE
Refills: 0 | Status: COMPLETED | OUTPATIENT
Start: 2025-02-15 | End: 2025-02-15

## 2025-02-15 RX ADMIN — Medication 1000 MILLILITER(S): at 22:24

## 2025-02-15 RX ADMIN — ACETAMINOPHEN 400 MILLIGRAM(S): 160 SUSPENSION ORAL at 22:24

## 2025-02-15 NOTE — ED PROVIDER NOTE - PHYSICAL EXAMINATION
SEE MDM SEE MDM  Attending Angelina Winter: Gen: NAD, heent: atrauamtic, eomi, perrla, mmm, op pink, neck; nttp, no nuchal rigidity, , cv: rrr, no murmurs, lungs: ctab, abd: soft, mild ttp llq, no guarding, ext: wwp, neg homans, skin: no rash, neuro: awake and alert, following commands, speech clear, sensation and strength intact, no focal deficits

## 2025-02-15 NOTE — ED ADULT NURSE NOTE - NSFALLRISKINTERV_ED_ALL_ED
Assistance OOB with selected safe patient handling equipment if applicable/Assistance with ambulation/Communicate fall risk and risk factors to all staff, patient, and family/Monitor gait and stability/Provide visual cue: yellow wristband, yellow gown, etc/Reinforce activity limits and safety measures with patient and family/Call bell, personal items and telephone in reach/Instruct patient to call for assistance before getting out of bed/chair/stretcher/Non-slip footwear applied when patient is off stretcher/[place] to call system/Physically safe environment - no spills, clutter or unnecessary equipment/Purposeful Proactive Rounding/Room/bathroom lighting operational, light cord in reach

## 2025-02-15 NOTE — ED PROVIDER NOTE - PATIENT PORTAL LINK FT
You can access the FollowMyHealth Patient Portal offered by [facility] by registering at the following website: [email or web address]. By joining [employer]’s FollowMyHealth portal, you will also be able to view your health information using other applications (apps) compatible with our system.

## 2025-02-15 NOTE — ED ADULT NURSE REASSESSMENT NOTE - NS ED NURSE REASSESS COMMENT FT1
received report from ALEYDA Brar. pt resting comfortably in stretcher. A&Ox3. P  Pt denies chest pain, n/v/d, respirations are spontaneous and unlabored, NAD noted.   pt pending admit. plan of care discussed. safety and comfort measures maintained.   Respirations are spontaneous and unlabored, skin warm dry and intact  Pt denies needs at this time

## 2025-02-15 NOTE — ED PROVIDER NOTE - CLINICAL SUMMARY MEDICAL DECISION MAKING FREE TEXT BOX
39-year-old female, history of factor V Leyden, endometriosis, diabetes, intellectual disability, bipolar disorder, schizoaffective disorder, presents to ED from rehab with complaints few hours of pain to left lower quadrant.  States pain worse when she coughs when she sits up.  Did not take any medications prior to arrival.  No similar symptoms. Denies fever, chills, nausea, vomiting, diarrhea.  Also reports productive cough and nasal congestion for the past week.  No chest pain or shortness of breath.  Mildly tachycardic to 109, remainder vital signs stable.  Patient with appearing, no acute distress, heart regular rate and rhythm, lungs clear, abdomen soft with left lower quadrant tenderness, no gross motor or sensory deficits.  Will assess for metabolic\infectious process, acute abdominal pathology, pelvic process.  Will obtain lab work, CT abdomen pelvis with IV contrast, transvaginal ultrasound, analgesics, IV fluids. 39-year-old female, history of factor V Leyden, endometriosis, diabetes, intellectual disability, bipolar disorder, schizoaffective disorder, presents to ED from rehab with complaints few hours of pain to left lower quadrant.  States pain worse when she coughs when she sits up.  Did not take any medications prior to arrival.  No similar symptoms. Denies fever, chills, nausea, vomiting, diarrhea.  Also reports productive cough and nasal congestion for the past week.  No chest pain or shortness of breath.  Mildly tachycardic to 109, remainder vital signs stable.  Patient with appearing, no acute distress, heart regular rate and rhythm, lungs clear, abdomen soft with left lower quadrant tenderness, no gross motor or sensory deficits.  Will assess for metabolic\infectious process, acute abdominal pathology, pelvic process.  Will obtain lab work, CT abdomen pelvis with IV contrast, transvaginal ultrasound, analgesics, IV fluids.  Attending Angelina Winter: 38 yo female with multiple medical issues includin g h/o factor 5 leyden, DM, bipolar disorder, schizoaffective disorder, chronic pain presenting with concern for abdominal pain. pt denies any fevers or chills. pt denies any vaginal discharge. upon arrival pt hemodynaically stable. reviewed prior notes and complex care plan. pt with prior appendectomy. pt passing gas. on exam pt with mild ttp LLQ. no rash on exam to suggest shingles. no known h/o ovarian cyst making torsion less likely. no known h/o inflammatory bbowel disease. pt denies any dysuria making UTI less likely. will obtain ct to evauate for diverticultiis vs colitis . no reported h/o ureteral stone. will re eval pending ct.

## 2025-02-15 NOTE — ED ADULT NURSE NOTE - OBJECTIVE STATEMENT
38 y/o female AOx3 with pmhx of DM2, Schizoaffective disorder, Bipolar disorder, and Asthma c/o of left sided hip pain radiating to left buttock. Pt endorses pain started randomly while laying down earlier today, denies any strenuous activity or exercise. Pt also endorses feeling general weakness and malaise for past week  and having productive mucus cough, reports having decreased appetite and bilateral swelling of hands. Pt currently denies any chest pain, SOB, dyspnea, n/v/d, fevers/chills, abdominal pain, dysuria, and headaches. Lung sounds clear in all lobes, breathing spontaneously and unlabored. Abdomen soft, non-distended, and non-tender. Pulses palpable in all extremities, cap refill less than 2 seconds. VS stable except heart rate slightly tachycardic. Pt currently laying in stretcher bed, no signs of distress. Safety and comfort provided.

## 2025-02-15 NOTE — ED PROVIDER NOTE - ATTENDING CONTRIBUTION TO CARE
Attending MD Angelina Winter:  I personally have seen and examined this patient.  Resident note reviewed and agree on plan of care and except where noted.  See HPI, PE, and MDM for details.

## 2025-02-15 NOTE — ED PROVIDER NOTE - PROGRESS NOTE DETAILS
Gavin PGY2: patient c/o diffuse itching.  on exam, no rashes or erythema over skin.  patient HDS.  will give benadryl and reassess. Gavin PGY2: patient complaining of diffuse pain all over.  home gabapentin and tylenol given without improvement.  5mg oxy ordered, however, dropped onto floor before administration.  will order again. Gavin PGY2: patient feeling improved at this time . will dc back to rehab.

## 2025-02-15 NOTE — ED ADULT NURSE REASSESSMENT NOTE - NS ED NURSE REASSESS COMMENT FT1
2 RNs attempted IV insertion, unsuccessful. Pt tolerated well, MD Winter aware. Waiting for ultrasound guided IV. Patient currently showing no signs of distress, safety and comfort provided.

## 2025-02-16 ENCOUNTER — EMERGENCY (EMERGENCY)
Facility: HOSPITAL | Age: 40
LOS: 1 days | Discharge: HOME | End: 2025-02-16
Attending: EMERGENCY MEDICINE
Payer: MEDICARE

## 2025-02-16 VITALS
DIASTOLIC BLOOD PRESSURE: 66 MMHG | TEMPERATURE: 98 F | HEART RATE: 109 BPM | OXYGEN SATURATION: 95 % | RESPIRATION RATE: 18 BRPM | HEIGHT: 68 IN | SYSTOLIC BLOOD PRESSURE: 110 MMHG | WEIGHT: 293 LBS

## 2025-02-16 VITALS
SYSTOLIC BLOOD PRESSURE: 94 MMHG | OXYGEN SATURATION: 97 % | TEMPERATURE: 98 F | RESPIRATION RATE: 16 BRPM | DIASTOLIC BLOOD PRESSURE: 64 MMHG | HEART RATE: 100 BPM

## 2025-02-16 DIAGNOSIS — Z90.49 ACQUIRED ABSENCE OF OTHER SPECIFIED PARTS OF DIGESTIVE TRACT: Chronic | ICD-10-CM

## 2025-02-16 LAB
ALBUMIN SERPL ELPH-MCNC: 4.2 G/DL — SIGNIFICANT CHANGE UP (ref 3.3–5)
ALP SERPL-CCNC: 74 U/L — SIGNIFICANT CHANGE UP (ref 40–120)
ALT FLD-CCNC: 34 U/L — SIGNIFICANT CHANGE UP (ref 10–45)
ANION GAP SERPL CALC-SCNC: 13 MMOL/L — SIGNIFICANT CHANGE UP (ref 5–17)
AST SERPL-CCNC: 20 U/L — SIGNIFICANT CHANGE UP (ref 10–40)
BASOPHILS # BLD AUTO: 0.01 K/UL — SIGNIFICANT CHANGE UP (ref 0–0.2)
BASOPHILS NFR BLD AUTO: 0.2 % — SIGNIFICANT CHANGE UP (ref 0–2)
BILIRUB SERPL-MCNC: 0.2 MG/DL — SIGNIFICANT CHANGE UP (ref 0.2–1.2)
BUN SERPL-MCNC: 12 MG/DL — SIGNIFICANT CHANGE UP (ref 7–23)
CALCIUM SERPL-MCNC: 9.6 MG/DL — SIGNIFICANT CHANGE UP (ref 8.4–10.5)
CHLORIDE SERPL-SCNC: 103 MMOL/L — SIGNIFICANT CHANGE UP (ref 96–108)
CO2 SERPL-SCNC: 25 MMOL/L — SIGNIFICANT CHANGE UP (ref 22–31)
CREAT SERPL-MCNC: 0.76 MG/DL — SIGNIFICANT CHANGE UP (ref 0.5–1.3)
EGFR: 103 ML/MIN/1.73M2 — SIGNIFICANT CHANGE UP
EOSINOPHIL # BLD AUTO: 0.04 K/UL — SIGNIFICANT CHANGE UP (ref 0–0.5)
EOSINOPHIL NFR BLD AUTO: 0.8 % — SIGNIFICANT CHANGE UP (ref 0–6)
GAS PNL BLDV: SIGNIFICANT CHANGE UP
GLUCOSE SERPL-MCNC: 115 MG/DL — HIGH (ref 70–99)
HCT VFR BLD CALC: 34.3 % — LOW (ref 34.5–45)
HGB BLD-MCNC: 10.7 G/DL — LOW (ref 11.5–15.5)
IMM GRANULOCYTES NFR BLD AUTO: 0.8 % — SIGNIFICANT CHANGE UP (ref 0–0.9)
LYMPHOCYTES # BLD AUTO: 1.77 K/UL — SIGNIFICANT CHANGE UP (ref 1–3.3)
LYMPHOCYTES # BLD AUTO: 36 % — SIGNIFICANT CHANGE UP (ref 13–44)
MCHC RBC-ENTMCNC: 29.5 PG — SIGNIFICANT CHANGE UP (ref 27–34)
MCHC RBC-ENTMCNC: 31.2 G/DL — LOW (ref 32–36)
MCV RBC AUTO: 94.5 FL — SIGNIFICANT CHANGE UP (ref 80–100)
MONOCYTES # BLD AUTO: 0.52 K/UL — SIGNIFICANT CHANGE UP (ref 0–0.9)
MONOCYTES NFR BLD AUTO: 10.6 % — SIGNIFICANT CHANGE UP (ref 2–14)
NEUTROPHILS # BLD AUTO: 2.54 K/UL — SIGNIFICANT CHANGE UP (ref 1.8–7.4)
NEUTROPHILS NFR BLD AUTO: 51.6 % — SIGNIFICANT CHANGE UP (ref 43–77)
NRBC BLD AUTO-RTO: 0 /100 WBCS — SIGNIFICANT CHANGE UP (ref 0–0)
NT-PROBNP SERPL-SCNC: <36 PG/ML — SIGNIFICANT CHANGE UP (ref 0–300)
PLATELET # BLD AUTO: 120 K/UL — LOW (ref 150–400)
POTASSIUM SERPL-MCNC: 3.9 MMOL/L — SIGNIFICANT CHANGE UP (ref 3.5–5.3)
POTASSIUM SERPL-SCNC: 3.9 MMOL/L — SIGNIFICANT CHANGE UP (ref 3.5–5.3)
PROT SERPL-MCNC: 7.5 G/DL — SIGNIFICANT CHANGE UP (ref 6–8.3)
RBC # BLD: 3.63 M/UL — LOW (ref 3.8–5.2)
RBC # FLD: 15.5 % — HIGH (ref 10.3–14.5)
SODIUM SERPL-SCNC: 141 MMOL/L — SIGNIFICANT CHANGE UP (ref 135–145)
TROPONIN T, HIGH SENSITIVITY RESULT: <6 NG/L — SIGNIFICANT CHANGE UP (ref 0–51)
WBC # BLD: 4.92 K/UL — SIGNIFICANT CHANGE UP (ref 3.8–10.5)
WBC # FLD AUTO: 4.92 K/UL — SIGNIFICANT CHANGE UP (ref 3.8–10.5)

## 2025-02-16 PROCEDURE — 87637 SARSCOV2&INF A&B&RSV AMP PRB: CPT

## 2025-02-16 PROCEDURE — 96374 THER/PROPH/DIAG INJ IV PUSH: CPT

## 2025-02-16 PROCEDURE — 80053 COMPREHEN METABOLIC PANEL: CPT

## 2025-02-16 PROCEDURE — 99285 EMERGENCY DEPT VISIT HI MDM: CPT | Mod: GC

## 2025-02-16 PROCEDURE — 74174 CTA ABD&PLVS W/CONTRAST: CPT | Mod: 26

## 2025-02-16 PROCEDURE — 82435 ASSAY OF BLOOD CHLORIDE: CPT

## 2025-02-16 PROCEDURE — 74176 CT ABD & PELVIS W/O CONTRAST: CPT | Mod: MC

## 2025-02-16 PROCEDURE — 71046 X-RAY EXAM CHEST 2 VIEWS: CPT

## 2025-02-16 PROCEDURE — 83690 ASSAY OF LIPASE: CPT

## 2025-02-16 PROCEDURE — 85014 HEMATOCRIT: CPT

## 2025-02-16 PROCEDURE — 84295 ASSAY OF SERUM SODIUM: CPT

## 2025-02-16 PROCEDURE — 82330 ASSAY OF CALCIUM: CPT

## 2025-02-16 PROCEDURE — 82947 ASSAY GLUCOSE BLOOD QUANT: CPT

## 2025-02-16 PROCEDURE — 99284 EMERGENCY DEPT VISIT MOD MDM: CPT | Mod: 25

## 2025-02-16 PROCEDURE — 76937 US GUIDE VASCULAR ACCESS: CPT

## 2025-02-16 PROCEDURE — 71275 CT ANGIOGRAPHY CHEST: CPT | Mod: 26

## 2025-02-16 PROCEDURE — 82803 BLOOD GASES ANY COMBINATION: CPT

## 2025-02-16 PROCEDURE — 36410 VNPNXR 3YR/> PHY/QHP DX/THER: CPT

## 2025-02-16 PROCEDURE — 85025 COMPLETE CBC W/AUTO DIFF WBC: CPT

## 2025-02-16 PROCEDURE — 84702 CHORIONIC GONADOTROPIN TEST: CPT

## 2025-02-16 PROCEDURE — 83605 ASSAY OF LACTIC ACID: CPT

## 2025-02-16 PROCEDURE — 84132 ASSAY OF SERUM POTASSIUM: CPT

## 2025-02-16 PROCEDURE — 85018 HEMOGLOBIN: CPT

## 2025-02-16 RX ORDER — FAMOTIDINE 10 MG/ML
20 INJECTION INTRAVENOUS ONCE
Refills: 0 | Status: COMPLETED | OUTPATIENT
Start: 2025-02-16 | End: 2025-02-16

## 2025-02-16 RX ORDER — ACETAMINOPHEN 160 MG/5ML
975 SUSPENSION ORAL ONCE
Refills: 0 | Status: COMPLETED | OUTPATIENT
Start: 2025-02-16 | End: 2025-02-16

## 2025-02-16 RX ORDER — GABAPENTIN 800 MG/1
100 TABLET ORAL ONCE
Refills: 0 | Status: COMPLETED | OUTPATIENT
Start: 2025-02-16 | End: 2025-02-16

## 2025-02-16 RX ORDER — QUETIAPINE FUMARATE 300 MG/1
200 TABLET ORAL ONCE
Refills: 0 | Status: COMPLETED | OUTPATIENT
Start: 2025-02-16 | End: 2025-02-16

## 2025-02-16 RX ORDER — OXYCODONE HYDROCHLORIDE 30 MG/1
5 TABLET ORAL ONCE
Refills: 0 | Status: DISCONTINUED | OUTPATIENT
Start: 2025-02-16 | End: 2025-02-16

## 2025-02-16 RX ORDER — ACETAMINOPHEN 160 MG/5ML
1000 SUSPENSION ORAL ONCE
Refills: 0 | Status: COMPLETED | OUTPATIENT
Start: 2025-02-16 | End: 2025-02-16

## 2025-02-16 RX ORDER — METOCLOPRAMIDE 10 MG/1
10 TABLET ORAL ONCE
Refills: 0 | Status: COMPLETED | OUTPATIENT
Start: 2025-02-16 | End: 2025-02-16

## 2025-02-16 RX ADMIN — ACETAMINOPHEN 975 MILLIGRAM(S): 160 SUSPENSION ORAL at 08:02

## 2025-02-16 RX ADMIN — ACETAMINOPHEN 400 MILLIGRAM(S): 160 SUSPENSION ORAL at 20:52

## 2025-02-16 RX ADMIN — Medication 50 MILLIGRAM(S): at 00:10

## 2025-02-16 RX ADMIN — OXYCODONE HYDROCHLORIDE 5 MILLIGRAM(S): 30 TABLET ORAL at 01:54

## 2025-02-16 RX ADMIN — FAMOTIDINE 20 MILLIGRAM(S): 10 INJECTION INTRAVENOUS at 20:48

## 2025-02-16 RX ADMIN — QUETIAPINE FUMARATE 200 MILLIGRAM(S): 300 TABLET ORAL at 00:10

## 2025-02-16 RX ADMIN — GABAPENTIN 100 MILLIGRAM(S): 800 TABLET ORAL at 00:10

## 2025-02-16 RX ADMIN — GABAPENTIN 100 MILLIGRAM(S): 800 TABLET ORAL at 08:08

## 2025-02-16 RX ADMIN — METOCLOPRAMIDE 10 MILLIGRAM(S): 10 TABLET ORAL at 20:51

## 2025-02-16 RX ADMIN — GABAPENTIN 100 MILLIGRAM(S): 800 TABLET ORAL at 08:03

## 2025-02-16 RX ADMIN — QUETIAPINE FUMARATE 200 MILLIGRAM(S): 300 TABLET ORAL at 08:02

## 2025-02-16 NOTE — ED ADULT NURSE NOTE - CAS EDN DISCHARGE INTERVENTIONS
Arnold-Chiari Malformation  Dx: in utero  History of Urinary Tract Infection  last February 2013  Hydrocephalus, Congenital    Tethered cord IV discontinued, cath removed intact

## 2025-02-16 NOTE — ED PROVIDER NOTE - PATIENT PORTAL LINK FT
You can access the FollowMyHealth Patient Portal offered by [facility] by registering at the following website: [email or web address]. By joining [employer]’s FollowMyHealth portal, you will also be able to view your health information using other applications (apps) compatible with our system.
(V5) oriented, appropriate

## 2025-02-16 NOTE — ED PROVIDER NOTE - PHYSICAL EXAMINATION
On physical exam patient appears uncomfortable, lungs clear to auscultation bilaterally, heart rate regular rate and rhythm no murmurs rubs or gallops, epigastric abdominal tenderness abdomen otherwise soft, no lower limb edema

## 2025-02-16 NOTE — ED PROVIDER NOTE - CLINICAL SUMMARY MEDICAL DECISION MAKING FREE TEXT BOX
Attending note.  Patient was seen in [place] [address].  Patient was just seen yesterday with abdominal pain for the last few days.  CT scan and labs are unremarkable.  Patient has a history of factor V Leyden deficiency with prior history of stroke, DVTs and PE.  Pain is in the epigastrium and the chest on both sides anteriorly.  She reports some shortness of breath.  She denies any fevers, chills or sweats.  She has episode of dysuria today but no hematuria.  She also reports having bowel movement today and passing gas.  She denies any GI bleeding.  She has had nausea and vomiting as well which is nonbloody nonbilious.  She has a past medical history of appendectomy but denies any other surgeries.  She has a past medical history of asthma, diabetes, hypertension, GERD, PE, DVT, hypothyroidism, bipolar disorder and previous stroke.  Patient endorses taking her medication every day which includes Eliquis.  She has multiple allergies including penicillin, Zofran, tramadol, Toradol.  LMP was 1 month ago.     PE-patient is alert in no acute distress.  Patient is slightly tachycardic at 110.  Patient is afebrile.  There is no pallor or jaundice.  Patient has moist mucous membranes.  Lungs are clear and equal bilaterally.  Heart is regular rate and rhythm with slight tachycardia.  Abdomen is obese, soft with tenderness in the epigastrium.  There is no guarding or rebound.  There is no extremity tenderness or edema.  Patient has slight weakness on the left side compared to the right.     A/P-patient with epigastric pain with radiation into both sides of the chest for the last 2 to 3 days associated with nausea and vomiting.  Patient had CT scan and labs yesterday which were unremarkable.  Patient was discharged but states patient has worsened.  As patient has history of prior PE, DVT and stroke secondary to factor V Leyden, will do CT angio of chest and CT angio of abdomen pelvis in addition to labs, urine, hCG.  Analgesia and reassess.

## 2025-02-16 NOTE — ED PROVIDER NOTE - OBJECTIVE STATEMENT
Patient is a 39-year-old female with a history of factor V Leiden, IBS, GERD, seizure disorder who presents with abdominal pain.  Was evaluated yesterday for similar abdominal pain discharge home after unremarkable CT and labs.  Denies any fevers, chills, vomiting, diarrhea.  Endorses nausea with no vomiting.  Still endorses dysuria with no hematuria.

## 2025-02-16 NOTE — ED ADULT NURSE NOTE - OBJECTIVE STATEMENT
----- Message from Hollis Haji sent at 9/25/2022 11:16 PM EDT -----  Regarding: Refill   I need to get a appointment made so I can get back on medication. This can't be out in the middle or end of October or November.  Please call to set up appointment    Pt is a 40y/o F PMH CVA, DVTs, PE, bipolar, schizoaffective disorder BIBEMS from nursing home to [facility] ED for abdominal pain. Pt was seen at [facility] ED yesterday for abdominal pain for the last few days. CTs and labs were unremarkable, pt was discharged back to facility. Pt now endorsing worsening in symptoms since discharge, epigastric pain with associated nausea and vomiting. Upon physical assessment pt abdomen soft and tender upon palpation, pt skin warm and dry, alert and oriented, airway patent, afebrile. Pt is A&Ox3 currently denying any HA, visual deficits, SOB, cough, CP, palpitations, urinary symptoms, d/c, fever/chills. Pt ambulates independently at baseline. Bedrail's up and comfort measures provided

## 2025-02-17 VITALS
DIASTOLIC BLOOD PRESSURE: 76 MMHG | RESPIRATION RATE: 16 BRPM | OXYGEN SATURATION: 99 % | HEART RATE: 99 BPM | TEMPERATURE: 98 F | SYSTOLIC BLOOD PRESSURE: 112 MMHG

## 2025-02-17 LAB
APPEARANCE UR: CLEAR — SIGNIFICANT CHANGE UP
BACTERIA # UR AUTO: ABNORMAL /HPF
BILIRUB UR-MCNC: NEGATIVE — SIGNIFICANT CHANGE UP
CAST: 0 /LPF — SIGNIFICANT CHANGE UP (ref 0–4)
COLOR SPEC: YELLOW — SIGNIFICANT CHANGE UP
DIFF PNL FLD: NEGATIVE — SIGNIFICANT CHANGE UP
GLUCOSE UR QL: NEGATIVE MG/DL — SIGNIFICANT CHANGE UP
KETONES UR-MCNC: NEGATIVE MG/DL — SIGNIFICANT CHANGE UP
LEUKOCYTE ESTERASE UR-ACNC: NEGATIVE — SIGNIFICANT CHANGE UP
NITRITE UR-MCNC: NEGATIVE — SIGNIFICANT CHANGE UP
PH UR: 7 — SIGNIFICANT CHANGE UP (ref 5–8)
PROT UR-MCNC: NEGATIVE MG/DL — SIGNIFICANT CHANGE UP
RBC CASTS # UR COMP ASSIST: 1 /HPF — SIGNIFICANT CHANGE UP (ref 0–4)
SP GR SPEC: >1.03 — HIGH (ref 1–1.03)
SQUAMOUS # UR AUTO: 6 /HPF — HIGH (ref 0–5)
UROBILINOGEN FLD QL: 1 MG/DL — SIGNIFICANT CHANGE UP (ref 0.2–1)
WBC UR QL: 1 /HPF — SIGNIFICANT CHANGE UP (ref 0–5)

## 2025-02-17 PROCEDURE — 85018 HEMOGLOBIN: CPT

## 2025-02-17 PROCEDURE — 96375 TX/PRO/DX INJ NEW DRUG ADDON: CPT | Mod: XU

## 2025-02-17 PROCEDURE — 74174 CTA ABD&PLVS W/CONTRAST: CPT | Mod: MC

## 2025-02-17 PROCEDURE — 83880 ASSAY OF NATRIURETIC PEPTIDE: CPT

## 2025-02-17 PROCEDURE — 87086 URINE CULTURE/COLONY COUNT: CPT

## 2025-02-17 PROCEDURE — 82947 ASSAY GLUCOSE BLOOD QUANT: CPT

## 2025-02-17 PROCEDURE — 80053 COMPREHEN METABOLIC PANEL: CPT

## 2025-02-17 PROCEDURE — 81001 URINALYSIS AUTO W/SCOPE: CPT

## 2025-02-17 PROCEDURE — 83605 ASSAY OF LACTIC ACID: CPT

## 2025-02-17 PROCEDURE — 93005 ELECTROCARDIOGRAM TRACING: CPT

## 2025-02-17 PROCEDURE — 85025 COMPLETE CBC W/AUTO DIFF WBC: CPT

## 2025-02-17 PROCEDURE — 82803 BLOOD GASES ANY COMBINATION: CPT

## 2025-02-17 PROCEDURE — 85014 HEMATOCRIT: CPT

## 2025-02-17 PROCEDURE — 82435 ASSAY OF BLOOD CHLORIDE: CPT

## 2025-02-17 PROCEDURE — 96374 THER/PROPH/DIAG INJ IV PUSH: CPT | Mod: XU

## 2025-02-17 PROCEDURE — 99285 EMERGENCY DEPT VISIT HI MDM: CPT | Mod: 25

## 2025-02-17 PROCEDURE — 84295 ASSAY OF SERUM SODIUM: CPT

## 2025-02-17 PROCEDURE — 82330 ASSAY OF CALCIUM: CPT

## 2025-02-17 PROCEDURE — 84132 ASSAY OF SERUM POTASSIUM: CPT

## 2025-02-17 PROCEDURE — 84484 ASSAY OF TROPONIN QUANT: CPT

## 2025-02-17 PROCEDURE — 71275 CT ANGIOGRAPHY CHEST: CPT | Mod: MC

## 2025-02-17 RX ORDER — OXYCODONE HYDROCHLORIDE 30 MG/1
5 TABLET ORAL ONCE
Refills: 0 | Status: DISCONTINUED | OUTPATIENT
Start: 2025-02-17 | End: 2025-02-17

## 2025-02-17 RX ORDER — DIPHENHYDRAMINE HCL 25 MG
50 CAPSULE ORAL ONCE
Refills: 0 | Status: COMPLETED | OUTPATIENT
Start: 2025-02-17 | End: 2025-02-17

## 2025-02-17 RX ORDER — GABAPENTIN 800 MG/1
100 TABLET ORAL ONCE
Refills: 0 | Status: COMPLETED | OUTPATIENT
Start: 2025-02-17 | End: 2025-02-17

## 2025-02-17 RX ADMIN — GABAPENTIN 100 MILLIGRAM(S): 800 TABLET ORAL at 00:39

## 2025-02-17 RX ADMIN — Medication 50 MILLIGRAM(S): at 00:39

## 2025-02-17 RX ADMIN — OXYCODONE HYDROCHLORIDE 5 MILLIGRAM(S): 30 TABLET ORAL at 01:39

## 2025-02-18 LAB
CULTURE RESULTS: SIGNIFICANT CHANGE UP
SPECIMEN SOURCE: SIGNIFICANT CHANGE UP

## 2025-02-26 ENCOUNTER — EMERGENCY (EMERGENCY)
Facility: HOSPITAL | Age: 40
LOS: 1 days | Discharge: HOME | End: 2025-02-26
Attending: STUDENT IN AN ORGANIZED HEALTH CARE EDUCATION/TRAINING PROGRAM
Payer: MEDICARE

## 2025-02-26 VITALS
OXYGEN SATURATION: 95 % | TEMPERATURE: 98 F | SYSTOLIC BLOOD PRESSURE: 105 MMHG | HEIGHT: 68 IN | HEART RATE: 103 BPM | RESPIRATION RATE: 18 BRPM | DIASTOLIC BLOOD PRESSURE: 77 MMHG

## 2025-02-26 DIAGNOSIS — Z90.49 ACQUIRED ABSENCE OF OTHER SPECIFIED PARTS OF DIGESTIVE TRACT: Chronic | ICD-10-CM

## 2025-02-26 PROCEDURE — 71250 CT THORAX DX C-: CPT | Mod: MC

## 2025-02-26 PROCEDURE — 73564 X-RAY EXAM KNEE 4 OR MORE: CPT

## 2025-02-26 PROCEDURE — 99285 EMERGENCY DEPT VISIT HI MDM: CPT

## 2025-02-26 PROCEDURE — 72125 CT NECK SPINE W/O DYE: CPT | Mod: 26

## 2025-02-26 PROCEDURE — 70450 CT HEAD/BRAIN W/O DYE: CPT | Mod: MC

## 2025-02-26 PROCEDURE — 73080 X-RAY EXAM OF ELBOW: CPT | Mod: 26,LT

## 2025-02-26 PROCEDURE — 70450 CT HEAD/BRAIN W/O DYE: CPT | Mod: 26

## 2025-02-26 PROCEDURE — 99284 EMERGENCY DEPT VISIT MOD MDM: CPT | Mod: 25

## 2025-02-26 PROCEDURE — 71250 CT THORAX DX C-: CPT | Mod: 26

## 2025-02-26 PROCEDURE — 73080 X-RAY EXAM OF ELBOW: CPT

## 2025-02-26 PROCEDURE — 72125 CT NECK SPINE W/O DYE: CPT | Mod: MC

## 2025-02-26 PROCEDURE — 73564 X-RAY EXAM KNEE 4 OR MORE: CPT | Mod: 26,LT

## 2025-02-26 RX ORDER — GABAPENTIN 400 MG/1
300 CAPSULE ORAL ONCE
Refills: 0 | Status: COMPLETED | OUTPATIENT
Start: 2025-02-26 | End: 2025-02-26

## 2025-02-26 RX ORDER — ACETAMINOPHEN 500 MG/5ML
975 LIQUID (ML) ORAL ONCE
Refills: 0 | Status: COMPLETED | OUTPATIENT
Start: 2025-02-26 | End: 2025-02-26

## 2025-02-26 RX ORDER — DIPHENHYDRAMINE HCL 12.5MG/5ML
50 ELIXIR ORAL ONCE
Refills: 0 | Status: COMPLETED | OUTPATIENT
Start: 2025-02-26 | End: 2025-02-26

## 2025-02-26 RX ADMIN — GABAPENTIN 300 MILLIGRAM(S): 400 CAPSULE ORAL at 19:44

## 2025-02-26 RX ADMIN — Medication 50 MILLIGRAM(S): at 23:37

## 2025-02-26 RX ADMIN — Medication 975 MILLIGRAM(S): at 19:44

## 2025-02-27 VITALS
SYSTOLIC BLOOD PRESSURE: 109 MMHG | HEART RATE: 93 BPM | RESPIRATION RATE: 16 BRPM | DIASTOLIC BLOOD PRESSURE: 73 MMHG | OXYGEN SATURATION: 98 % | TEMPERATURE: 98 F

## 2025-02-27 NOTE — ED PROVIDER NOTE - CARDIAC, MLM
Specialty Pharmacy Patient Management Program  Reassessment     Ruth Casillas is a 52 y.o. female with migraines and enrolled in the Patient Management program offered by [facility]. A follow-up outreach was conducted, including assessment of continued therapy appropriateness, medication adherence, and side effect incidence and management for Nurtec.     Changes to Insurance Coverage or Financial Support  [employer] is new primary insurance     Relevant Past Medical History and Comorbidities  Relevant medical history and concomitant health conditions were discussed with the patient. The patient's chart has been reviewed for relevant past medical history and comorbid health conditions and updated as necessary.   Past Medical History:   Diagnosis Date    ADHD 2016    predom inattentive type. [facility].    Allergic rhinitis     Anemia     Anxiety     ASCUS with positive high risk HPV cervical 2019    Bloating     Cancer     melanoma in situ on back    Chronic nonintractable headache     Depression     Dysmenorrhea     Dyspareunia, female     Fatigue     Menorrhagia     Migraines     Rectal bleeding 2018    Vaginal atrophy     Vaginal laceration 2019    ADMITTED TO [facility]    Vasovagal syncope 2016     Social History     Socioeconomic History    Marital status: Single   Tobacco Use    Smoking status: Former     Current packs/day: 0.00     Average packs/day: 0.5 packs/day for 20.0 years (10.0 ttl pk-yrs)     Types: Cigarettes     Start date:      Quit date:      Years since quittin.1    Smokeless tobacco: Never    Tobacco comments:     quit for one month    Vaping Use    Vaping status: Never Used   Substance and Sexual Activity    Alcohol use: Yes     Alcohol/week: 1.0 standard drink of alcohol     Types: 1 Shots of liquor per week     Comment: MODERATE AMT    Drug use: No    Sexual activity: Not Currently     Partners: Male     Birth  control/protection: Surgical     Comment: TLH     Problem list reviewed by Mir Haque RPH on 2/27/2025 at 12:08 PM    Allergies  Known allergies and reactions were discussed with the patient. The patient's chart has been reviewed for allergy information and updated as necessary.   No Known Allergies  Allergies reviewed by Mir Haque RPH on 2/27/2025 at 12:08 PM    Relevant Laboratory Values  Lab Results   Component Value Date    GLUCOSE 87 01/10/2024    CALCIUM 10.1 01/10/2024     01/10/2024    K 5.0 01/10/2024    CO2 27.6 01/10/2024     01/10/2024    BUN 18 01/10/2024    CREATININE 0.87 01/10/2024    BCR 20.7 01/10/2024    ANIONGAP 14.1 09/27/2016     Lab Results   Component Value Date    WBC 5.27 01/10/2024    HGB 14.3 01/10/2024    HCT 42.4 01/10/2024    MCV 91.6 01/10/2024     01/10/2024     Lab Value Review  The above lab values have been reviewed; the following specialty medication(s) dose adjustment(s) are recommended: none.    Current Medication List  This medication list has been reviewed with the patient and evaluated for any interactions or necessary modifications/recommendations, and updated to include all prescription medications, OTC medications, and supplements the patient is currently taking. This list reflects what is contained in the patient's profile, which has also been marked as reviewed to communicate to other providers it is the most up to date version of the patient's current medication therapy.     Current Outpatient Medications:     amphetamine-dextroamphetamine (ADDERALL) 20 MG tablet, Take 1 tablet by mouth 2 (Two) Times a Day., Disp: 60 tablet, Rfl: 0    estradiol (ESTRACE) 0.1 MG/GM vaginal cream, Insert 1 gram into the vagina 2 (Two) Times a Week., Disp: 45 g, Rfl: 6    FLUoxetine (PROzac) 20 MG capsule, Take 1 capsule by mouth Daily., Disp: 90 capsule, Rfl: 1    multivitamin with minerals tablet tablet, Take 1 tablet by mouth Daily., Disp: , Rfl:      rimegepant sulfate (Nurtec) 75 MG tablet, Take 1 tablet by mouth Every Other Day., Disp: 16 tablet, Rfl: 5    rizatriptan MLT (Maxalt-MLT) 10 MG disintegrating tablet, Place 1 tablet on the tongue 1 (One) Time As Needed for Migraine. May repeat in 2 hours if needed, Disp: 9 tablet, Rfl: 0    tretinoin (RETIN-A) 0.025 % cream, Apply thin layer to face at bedtime for acne, Disp: 45 g, Rfl: 3  Medicines reviewed by Mir Haque [facility] on 2/27/2025 at 12:08 PM    Drug Interactions  none     Adverse Drug Reactions  Medication tolerability: Tolerating with no to minimal ADRs  Medication plan: Continue therapy with normal follow-up  Plan for ADR Management: none      Hospitalizations and Urgent Care Since Last Assessment  Hospitalizations or Admissions: none  ED Visits: none  Urgent Office Visits: none     Adherence, Self-Administration, and Current Therapy Problems  Adherence related to the patient's specialty therapy was discussed with the patient. The Adherence segment of this outreach has been reviewed and updated.     Adherence Questions  Linked Medication(s) Assessed: Rimegepant Sulfate (NURTEC-ODT)  On average, how many doses/injections does the patient miss per month?: 0  What are the identified reasons for non-adherence or missed doses? : no problems identified  What is the estimated medication adherence level?: % (Rx is a 32 day supply but for insurence rx has to be billed for a 30 day supply. this will bring down PDC score)  Based on the patient/caregiver response and refill history, does this patient require an MTP to track adherence improvements?: no    Additional Barriers to Patient Self-Administration: none  Methods for Supporting Patient Self-Administration: none    Open Medication Therapy Problems  No medication therapy recommendations to display    Goals of Therapy  Goals related to the patient's specialty therapy were discussed with the patient. The Patient Goals segment of this outreach has been  reviewed and updated.   Goals Addressed Today        Specialty Pharmacy General Goal      Reduce the number of migraine days per month by 50%.              Progress Toward Meeting Patient-Identified Goals of Therapy: On Track  New Patient-Identified Goals, If Applicable:     Progress Toward Meeting Clinical Goals or Therapeutic Targets: On Track  New Clinical Goals or Therapeutic Targets, If Applicable:     Quality of Life Assessment   Quality of Life related to the patient's enrollment in the patient management program and services provided was discussed with the patient. The QOL segment of this outreach has been reviewed and updated.  Quality of Life Improvement Scale: 7-Somewhat better    Reassessment Plan & Follow-Up  Medication Therapy Changes: none  Additional Plans, Therapy Recommendations, or Therapy Problems to Be Addressed: none   Pharmacist to perform regular reassessments no more than (6) months from the previous assessment.  Care Coordinator to set up future refill outreaches, coordinate prescription delivery, and escalate clinical questions to pharmacist.     Attestation  I attest the patient was actively involved in and has agreed to the above plan of care. I attest that the specialty medication(s) addressed above are appropriate for the patient based on my reassessment. If the prescribed therapy is at any point deemed not appropriate based on the current or future assessments, a consultation will be initiated with the patient's specialty care provider to determine the best course of action. The revised plan of therapy will be documented along with any required assessments and/or additional patient education provided.     Electronically signed by Mir Haque RPH, 02/27/25, 12:09 PM EST.    Normal rate, regular rhythm.  Heart sounds S1, S2.  No murmurs, rubs or gallops.

## 2025-03-01 ENCOUNTER — EMERGENCY (EMERGENCY)
Facility: HOSPITAL | Age: 40
LOS: 1 days | Discharge: HOME | End: 2025-03-01
Attending: STUDENT IN AN ORGANIZED HEALTH CARE EDUCATION/TRAINING PROGRAM
Payer: MEDICARE

## 2025-03-01 ENCOUNTER — NON-APPOINTMENT (OUTPATIENT)
Age: 40
End: 2025-03-01

## 2025-03-01 VITALS
RESPIRATION RATE: 16 BRPM | HEART RATE: 84 BPM | OXYGEN SATURATION: 96 % | SYSTOLIC BLOOD PRESSURE: 119 MMHG | TEMPERATURE: 98 F | DIASTOLIC BLOOD PRESSURE: 87 MMHG

## 2025-03-01 VITALS
WEIGHT: 293 LBS | TEMPERATURE: 99 F | HEART RATE: 94 BPM | RESPIRATION RATE: 18 BRPM | OXYGEN SATURATION: 96 % | SYSTOLIC BLOOD PRESSURE: 121 MMHG | HEIGHT: 68 IN | DIASTOLIC BLOOD PRESSURE: 83 MMHG

## 2025-03-01 DIAGNOSIS — Z90.49 ACQUIRED ABSENCE OF OTHER SPECIFIED PARTS OF DIGESTIVE TRACT: Chronic | ICD-10-CM

## 2025-03-01 PROCEDURE — 99284 EMERGENCY DEPT VISIT MOD MDM: CPT | Mod: GC

## 2025-03-01 PROCEDURE — 99284 EMERGENCY DEPT VISIT MOD MDM: CPT

## 2025-03-01 RX ORDER — OXYCODONE HYDROCHLORIDE 30 MG/1
5 TABLET ORAL ONCE
Refills: 0 | Status: DISCONTINUED | OUTPATIENT
Start: 2025-03-01 | End: 2025-03-01

## 2025-03-01 RX ORDER — DIPHENHYDRAMINE HCL 12.5MG/5ML
25 ELIXIR ORAL ONCE
Refills: 0 | Status: COMPLETED | OUTPATIENT
Start: 2025-03-01 | End: 2025-03-01

## 2025-03-01 RX ORDER — OXYCODONE HYDROCHLORIDE 30 MG/1
1 TABLET ORAL
Qty: 6 | Refills: 0
Start: 2025-03-01 | End: 2025-03-02

## 2025-03-01 RX ORDER — GABAPENTIN 400 MG/1
300 CAPSULE ORAL ONCE
Refills: 0 | Status: COMPLETED | OUTPATIENT
Start: 2025-03-01 | End: 2025-03-01

## 2025-03-01 RX ADMIN — OXYCODONE HYDROCHLORIDE 5 MILLIGRAM(S): 30 TABLET ORAL at 03:35

## 2025-03-01 RX ADMIN — OXYCODONE HYDROCHLORIDE 5 MILLIGRAM(S): 30 TABLET ORAL at 05:00

## 2025-03-01 RX ADMIN — Medication 25 MILLIGRAM(S): at 07:54

## 2025-03-01 RX ADMIN — GABAPENTIN 300 MILLIGRAM(S): 400 CAPSULE ORAL at 06:36

## 2025-03-01 NOTE — ED PROVIDER NOTE - PHYSICAL EXAMINATION
Gen: No acute distress  HEENT: EOMI, atraumatic  CV: RRR, +S1/S2, no M/R/G, 2+ radial pulses b/l  Resp: CTAB, no W/R/R, no accessory muscle use, no increased work of breathing  GI: Abdomen soft non-distended, NTTP  MSK: Tenderness to palpation over medial left ankle. No bruising or redness noted. Dorsalis pedis pulses 2+. No open wounds, no bruising,  Neuro: A&Ox3, following commands, moving all four extremities spontaneously  Psych: appropriate mood

## 2025-03-01 NOTE — ED ADULT NURSE NOTE - NSSUHOSCREENINGYN_ED_ALL_ED
Yes - the patient is able to be screened Cimzia Counseling:  I discussed with the patient the risks of Cimzia including but not limited to immunosuppression, allergic reactions and infections.  The patient understands that monitoring is required including a PPD at baseline and must alert us or the primary physician if symptoms of infection or other concerning signs are noted.

## 2025-03-01 NOTE — ED ADULT NURSE NOTE - OBJECTIVE STATEMENT
39y Female BIBEMS from a nursing facility PMH CVA, asthma, epilepsy, schizoaffective disorder, DM, bipolar, GERD, polyarthritis, present to ED for worsening left foot pain. Pt had a fall 2 days ago transporting from her wheelchair to the bed, went to an OSH was discharged with a foot fracture per pt. Pt has a boot placed from OSH and states she was suppose to be prescribed pain medications but did not receive any. Tylenol taken last at 12pm 2/28, not effective. Pt AAOx3,pulse present +2 bilaterally, follow commands, denies chest pain, shortness of breath, fever, n/v, chills.

## 2025-03-01 NOTE — ED ADULT TRIAGE NOTE - MEANS OF ARRIVAL
stretcher Kendrick Joseph), Orthopaedic Surgery Surgery  [address] [place] [address]  Phone: [phone]  Fax: [phone]

## 2025-03-01 NOTE — ED PROVIDER NOTE - OBJECTIVE STATEMENT
39-year-old female with past medical history IBS, GERD, seizure disorder, DVT on Eliquis presents status post fall 2/26.  Seen at [facility] where CT head, neck, chest was performed and all negative.  X-ray of the left knee was negative as well.  Patient then followed up with [facility] and was found to have a left ankle fracture.  Patient had x-rays and CT imaging and states that they were able to diagnose fracture based on their imaging there.  Patient was told that she would be prescribed Percocet to her rehab facility where she has been staying currently.  The rehab facility has not given her Percocet and she is only taking Tylenol.  Denies any new symptoms at this time including headaches, nausea, vomiting, chest pain, shortness of breath, fevers, chills.  Patient has chronic issue where she has difficulty with ambulation. Patient presenting for medication request.

## 2025-03-01 NOTE — ED PROVIDER NOTE - PROGRESS NOTE DETAILS
Fabi Siddiqui MD: Patient has allergy to tramadol. Got oxycodone in the ED with no adverse reaction. will send back to rehab with 2 days of oxycodone. Fabi Siddiqui MD: Patient has allergy to tramadol. Got oxycodone in the ED with no adverse reaction. Unable to obtain enough information to send patient home with oxycodone. Patient has many adverse drug interactions with oxycodone per her chart. Will discharge for patient to follow-up with [facility] who is following her injury. Reached out to [facility] to discuss medication discrepancies.  They state that there is no prescription for Percocet for this patient.  Patient is already on multiple medications that have sedative qualities.  Do not feel that we can give oxycodone/Percocet at this time.  We will give oxycodone here while evaluating the patient and have the ability to monitor her symptoms. Reached out to [facility] to discuss medication discrepancies.  They state that there is no prescription for Percocet for this patient.

## 2025-03-01 NOTE — ED PROVIDER NOTE - ATTENDING CONTRIBUTION TO CARE
Patient is a 39-year-old female with multiple medical problems including asthma, factor V Leiden, seizures, GERD, left foot injury, presenting with foot pain.  Patient states she fell out of her wheelchair, got a fracture in her left foot, coming from rehab in a brace/boot.  Patient states she was supposed to get Percocet, but did not show up at the rehab as expected.  Patient has no paperwork to confirm a fracture or the medication. Compartments soft, no significant swelling to LLE.  Patient coming from rehab, will call them to confirm that patient is actually supposed to be getting Percocet, does have a fracture in the left foot. Breathing comfortably. Mentating well. Will give 1 dose in the ED to start, dispo pending information from rehab. Patient is a 39-year-old female with multiple medical problems including asthma, factor V Leiden, seizures, GERD, left foot injury, presenting with foot pain.  Patient states she fell out of her wheelchair, got a fracture in her left foot, coming from rehab in a brace/boot.  Patient states she was supposed to get Percocet, but did not show up at the rehab as expected.  Patient has no paperwork to confirm a fracture or the medication. Compartments soft, no significant swelling to LLE. DP pulse intact. Patient coming from rehab, will call them to confirm that patient is actually supposed to be getting Percocet, does have a fracture in the left foot. Breathing comfortably. Mentating well. Will give 1 dose in the ED to start, dispo pending information from rehab.

## 2025-03-01 NOTE — ED PROVIDER NOTE - NSFOLLOWUPINSTRUCTIONS_ED_ALL_ED_FT
You were seen here in the emergency department due to your left lower extremity pain that you state you are prescribed Percocet for previously due to your diagnosed left ankle fracture found at an outside hospital.  You were given medications here in the hospital to help with your symptoms.  Due to you being seen at an outside hospital and being prescribed these medications you should reach out to your prep steering [place] to discuss the medications you believed they would be sending you.    Please return to the emergency department or seek medical attention immediately if you have any of the following symptoms.  These include but not limited to worsening pain, loss of sensation to the foot, redness or increased swelling, uncontrollable pain, fever.

## 2025-03-01 NOTE — ED PROVIDER NOTE - PATIENT PORTAL LINK FT
You can access the FollowMyHealth Patient Portal offered by [facility] by registering at the following website: [email or web address]. By joining [employer] [facility]’s FollowMyHealth portal, you will also be able to view your health information using other applications (apps) compatible with our system.

## 2025-03-01 NOTE — ED ADULT NURSE NOTE - NSFALLRISKINTERV_ED_ALL_ED

## 2025-03-01 NOTE — ED PROVIDER NOTE - CLINICAL SUMMARY MEDICAL DECISION MAKING FREE TEXT BOX
39-year-old female past medical history of IBS, GERD, seizure disorder, DVT on Eliquis presents s/p fall 2/26.  Previously seen at [place] [facility] during [place] where workup was performed with CT head, chest, neck and x-rays to the left knee and ankle.  CT imaging negative for acute injury.  X-ray findings of ankle fracture as stated by patient to the left ankle.  Patient was placed in a boot and discharged home to a rehab facility.  Patient coming today for medications since she did not receive her Percocet yet.  After evaluation of the ankle feel the patient can be safely discharged to work out her medication situation with her facility and [facility].  Dorsalis pedis pulses intact in the left lower extremity.  Sensation intact as well and compartments are soft.

## 2025-03-04 ENCOUNTER — NON-APPOINTMENT (OUTPATIENT)
Age: 40
End: 2025-03-04

## 2025-03-09 ENCOUNTER — INPATIENT (INPATIENT)
Facility: HOSPITAL | Age: 40
LOS: 1 days | Discharge: NURSING, REHAB OR LONG-TERM CARE | End: 2025-03-11
Attending: HOSPITALIST | Admitting: HOSPITALIST
Payer: MEDICARE

## 2025-03-09 VITALS
TEMPERATURE: 98 F | SYSTOLIC BLOOD PRESSURE: 119 MMHG | HEIGHT: 68 IN | HEART RATE: 93 BPM | RESPIRATION RATE: 18 BRPM | OXYGEN SATURATION: 98 % | DIASTOLIC BLOOD PRESSURE: 83 MMHG

## 2025-03-09 DIAGNOSIS — Z90.49 ACQUIRED ABSENCE OF OTHER SPECIFIED PARTS OF DIGESTIVE TRACT: Chronic | ICD-10-CM

## 2025-03-09 DIAGNOSIS — R29.810 FACIAL WEAKNESS: ICD-10-CM

## 2025-03-09 DIAGNOSIS — H53.8 OTHER VISUAL DISTURBANCES: ICD-10-CM

## 2025-03-09 DIAGNOSIS — K21.9 GASTRO-ESOPHAGEAL REFLUX DISEASE WITHOUT ESOPHAGITIS: ICD-10-CM

## 2025-03-09 DIAGNOSIS — Z78.9 OTHER SPECIFIED HEALTH STATUS: ICD-10-CM

## 2025-03-09 DIAGNOSIS — Z86.73 PERSONAL HISTORY OF TRANSIENT ISCHEMIC ATTACK (TIA), AND CEREBRAL INFARCTION WITHOUT RESIDUAL DEFICITS: ICD-10-CM

## 2025-03-09 DIAGNOSIS — Z29.9 ENCOUNTER FOR PROPHYLACTIC MEASURES, UNSPECIFIED: ICD-10-CM

## 2025-03-09 DIAGNOSIS — S82.92XA UNSPECIFIED FRACTURE OF LEFT LOWER LEG, INITIAL ENCOUNTER FOR CLOSED FRACTURE: ICD-10-CM

## 2025-03-09 DIAGNOSIS — R29.898 OTHER SYMPTOMS AND SIGNS INVOLVING THE MUSCULOSKELETAL SYSTEM: ICD-10-CM

## 2025-03-09 DIAGNOSIS — D68.51 ACTIVATED PROTEIN C RESISTANCE: ICD-10-CM

## 2025-03-09 DIAGNOSIS — E03.9 HYPOTHYROIDISM, UNSPECIFIED: ICD-10-CM

## 2025-03-09 LAB
ALBUMIN SERPL ELPH-MCNC: 4.4 G/DL — SIGNIFICANT CHANGE UP (ref 3.3–5)
ALP SERPL-CCNC: 60 U/L — SIGNIFICANT CHANGE UP (ref 40–120)
ALT FLD-CCNC: 35 U/L — HIGH (ref 4–33)
ANION GAP SERPL CALC-SCNC: 13 MMOL/L — SIGNIFICANT CHANGE UP (ref 7–14)
APTT BLD: 30.6 SEC — SIGNIFICANT CHANGE UP (ref 24.5–35.6)
AST SERPL-CCNC: 21 U/L — SIGNIFICANT CHANGE UP (ref 4–32)
BASOPHILS # BLD AUTO: 0.01 K/UL — SIGNIFICANT CHANGE UP (ref 0–0.2)
BASOPHILS NFR BLD AUTO: 0.2 % — SIGNIFICANT CHANGE UP (ref 0–2)
BILIRUB SERPL-MCNC: 0.3 MG/DL — SIGNIFICANT CHANGE UP (ref 0.2–1.2)
BLOOD GAS VENOUS COMPREHENSIVE RESULT: SIGNIFICANT CHANGE UP
BUN SERPL-MCNC: 11 MG/DL — SIGNIFICANT CHANGE UP (ref 7–23)
CALCIUM SERPL-MCNC: 9.6 MG/DL — SIGNIFICANT CHANGE UP (ref 8.4–10.5)
CHLORIDE SERPL-SCNC: 103 MMOL/L — SIGNIFICANT CHANGE UP (ref 98–107)
CO2 SERPL-SCNC: 24 MMOL/L — SIGNIFICANT CHANGE UP (ref 22–31)
CREAT SERPL-MCNC: 0.74 MG/DL — SIGNIFICANT CHANGE UP (ref 0.5–1.3)
EGFR: 107 ML/MIN/1.73M2 — SIGNIFICANT CHANGE UP
EGFR: 107 ML/MIN/1.73M2 — SIGNIFICANT CHANGE UP
EOSINOPHIL # BLD AUTO: 0.03 K/UL — SIGNIFICANT CHANGE UP (ref 0–0.5)
EOSINOPHIL NFR BLD AUTO: 0.6 % — SIGNIFICANT CHANGE UP (ref 0–6)
GLUCOSE SERPL-MCNC: 91 MG/DL — SIGNIFICANT CHANGE UP (ref 70–99)
HCG SERPL-ACNC: <1 MIU/ML — SIGNIFICANT CHANGE UP
HCT VFR BLD CALC: 35.1 % — SIGNIFICANT CHANGE UP (ref 34.5–45)
HGB BLD-MCNC: 11.7 G/DL — SIGNIFICANT CHANGE UP (ref 11.5–15.5)
IANC: 2.37 K/UL — SIGNIFICANT CHANGE UP (ref 1.8–7.4)
IMM GRANULOCYTES NFR BLD AUTO: 0 % — SIGNIFICANT CHANGE UP (ref 0–0.9)
INR BLD: 0.98 RATIO — SIGNIFICANT CHANGE UP (ref 0.85–1.16)
LYMPHOCYTES # BLD AUTO: 1.96 K/UL — SIGNIFICANT CHANGE UP (ref 1–3.3)
LYMPHOCYTES # BLD AUTO: 41.9 % — SIGNIFICANT CHANGE UP (ref 13–44)
MCHC RBC-ENTMCNC: 30.4 PG — SIGNIFICANT CHANGE UP (ref 27–34)
MCHC RBC-ENTMCNC: 33.3 G/DL — SIGNIFICANT CHANGE UP (ref 32–36)
MCV RBC AUTO: 91.2 FL — SIGNIFICANT CHANGE UP (ref 80–100)
MONOCYTES # BLD AUTO: 0.31 K/UL — SIGNIFICANT CHANGE UP (ref 0–0.9)
MONOCYTES NFR BLD AUTO: 6.6 % — SIGNIFICANT CHANGE UP (ref 2–14)
NEUTROPHILS # BLD AUTO: 2.37 K/UL — SIGNIFICANT CHANGE UP (ref 1.8–7.4)
NEUTROPHILS NFR BLD AUTO: 50.7 % — SIGNIFICANT CHANGE UP (ref 43–77)
NRBC # BLD AUTO: 0 K/UL — SIGNIFICANT CHANGE UP (ref 0–0)
NRBC # FLD: 0 K/UL — SIGNIFICANT CHANGE UP (ref 0–0)
NRBC BLD AUTO-RTO: 0 /100 WBCS — SIGNIFICANT CHANGE UP (ref 0–0)
PLATELET # BLD AUTO: 134 K/UL — LOW (ref 150–400)
POTASSIUM SERPL-MCNC: 4.2 MMOL/L — SIGNIFICANT CHANGE UP (ref 3.5–5.3)
POTASSIUM SERPL-SCNC: 4.2 MMOL/L — SIGNIFICANT CHANGE UP (ref 3.5–5.3)
PROT SERPL-MCNC: 7.7 G/DL — SIGNIFICANT CHANGE UP (ref 6–8.3)
PROTHROM AB SERPL-ACNC: 11.7 SEC — SIGNIFICANT CHANGE UP (ref 9.9–13.4)
RBC # BLD: 3.85 M/UL — SIGNIFICANT CHANGE UP (ref 3.8–5.2)
RBC # FLD: 15 % — HIGH (ref 10.3–14.5)
SODIUM SERPL-SCNC: 140 MMOL/L — SIGNIFICANT CHANGE UP (ref 135–145)
TROPONIN T, HIGH SENSITIVITY RESULT: <6 NG/L — SIGNIFICANT CHANGE UP
WBC # BLD: 4.68 K/UL — SIGNIFICANT CHANGE UP (ref 3.8–10.5)
WBC # FLD AUTO: 4.68 K/UL — SIGNIFICANT CHANGE UP (ref 3.8–10.5)

## 2025-03-09 PROCEDURE — 70498 CT ANGIOGRAPHY NECK: CPT | Mod: 26

## 2025-03-09 PROCEDURE — 99223 1ST HOSP IP/OBS HIGH 75: CPT

## 2025-03-09 PROCEDURE — 99223 1ST HOSP IP/OBS HIGH 75: CPT | Mod: GC

## 2025-03-09 PROCEDURE — 70450 CT HEAD/BRAIN W/O DYE: CPT | Mod: 26,XU

## 2025-03-09 PROCEDURE — 0042T: CPT

## 2025-03-09 PROCEDURE — 70496 CT ANGIOGRAPHY HEAD: CPT | Mod: 26

## 2025-03-09 PROCEDURE — 99291 CRITICAL CARE FIRST HOUR: CPT | Mod: GC

## 2025-03-09 RX ORDER — ATORVASTATIN CALCIUM 80 MG/1
40 TABLET, FILM COATED ORAL AT BEDTIME
Refills: 0 | Status: DISCONTINUED | OUTPATIENT
Start: 2025-03-09 | End: 2025-03-11

## 2025-03-09 RX ORDER — SENNA 187 MG
2 TABLET ORAL AT BEDTIME
Refills: 0 | Status: DISCONTINUED | OUTPATIENT
Start: 2025-03-09 | End: 2025-03-11

## 2025-03-09 RX ORDER — GABAPENTIN 400 MG/1
300 CAPSULE ORAL EVERY 8 HOURS
Refills: 0 | Status: DISCONTINUED | OUTPATIENT
Start: 2025-03-09 | End: 2025-03-11

## 2025-03-09 RX ORDER — QUETIAPINE FUMARATE 25 MG/1
200 TABLET ORAL AT BEDTIME
Refills: 0 | Status: DISCONTINUED | OUTPATIENT
Start: 2025-03-09 | End: 2025-03-11

## 2025-03-09 RX ORDER — DIPHENHYDRAMINE HCL 12.5MG/5ML
50 ELIXIR ORAL AT BEDTIME
Refills: 0 | Status: DISCONTINUED | OUTPATIENT
Start: 2025-03-09 | End: 2025-03-11

## 2025-03-09 RX ORDER — MELATONIN 5 MG
3 TABLET ORAL AT BEDTIME
Refills: 0 | Status: DISCONTINUED | OUTPATIENT
Start: 2025-03-09 | End: 2025-03-11

## 2025-03-09 RX ORDER — LEVOTHYROXINE SODIUM 300 MCG
100 TABLET ORAL DAILY
Refills: 0 | Status: DISCONTINUED | OUTPATIENT
Start: 2025-03-09 | End: 2025-03-11

## 2025-03-09 RX ORDER — DIPHENHYDRAMINE HCL 12.5MG/5ML
50 ELIXIR ORAL ONCE
Refills: 0 | Status: COMPLETED | OUTPATIENT
Start: 2025-03-09 | End: 2025-03-09

## 2025-03-09 RX ORDER — CYCLOBENZAPRINE HYDROCHLORIDE 15 MG/1
5 CAPSULE, EXTENDED RELEASE ORAL THREE TIMES A DAY
Refills: 0 | Status: DISCONTINUED | OUTPATIENT
Start: 2025-03-09 | End: 2025-03-11

## 2025-03-09 RX ORDER — ATORVASTATIN CALCIUM 80 MG/1
1 TABLET, FILM COATED ORAL
Refills: 0 | DISCHARGE

## 2025-03-09 RX ORDER — POLYETHYLENE GLYCOL 3350 17 G/17G
17 POWDER, FOR SOLUTION ORAL DAILY
Refills: 0 | Status: DISCONTINUED | OUTPATIENT
Start: 2025-03-09 | End: 2025-03-11

## 2025-03-09 RX ORDER — ACETAMINOPHEN 500 MG/5ML
650 LIQUID (ML) ORAL EVERY 8 HOURS
Refills: 0 | Status: DISCONTINUED | OUTPATIENT
Start: 2025-03-09 | End: 2025-03-11

## 2025-03-09 RX ORDER — ALBUTEROL SULFATE 2.5 MG/3ML
2 VIAL, NEBULIZER (ML) INHALATION EVERY 6 HOURS
Refills: 0 | Status: DISCONTINUED | OUTPATIENT
Start: 2025-03-09 | End: 2025-03-11

## 2025-03-09 RX ORDER — QUETIAPINE FUMARATE 25 MG/1
25 TABLET ORAL DAILY
Refills: 0 | Status: DISCONTINUED | OUTPATIENT
Start: 2025-03-09 | End: 2025-03-11

## 2025-03-09 RX ORDER — APIXABAN 2.5 MG/1
5 TABLET, FILM COATED ORAL
Refills: 0 | Status: DISCONTINUED | OUTPATIENT
Start: 2025-03-09 | End: 2025-03-11

## 2025-03-09 RX ORDER — ESCITALOPRAM OXALATE 20 MG/1
10 TABLET ORAL DAILY
Refills: 0 | Status: DISCONTINUED | OUTPATIENT
Start: 2025-03-09 | End: 2025-03-11

## 2025-03-09 RX ORDER — SUCRALFATE 1 G
1 TABLET ORAL
Refills: 0 | Status: DISCONTINUED | OUTPATIENT
Start: 2025-03-09 | End: 2025-03-11

## 2025-03-09 RX ADMIN — Medication 1 GRAM(S): at 17:58

## 2025-03-09 RX ADMIN — Medication 750 MILLIGRAM(S): at 17:59

## 2025-03-09 RX ADMIN — Medication 50 MILLIGRAM(S): at 22:54

## 2025-03-09 RX ADMIN — ESCITALOPRAM OXALATE 10 MILLIGRAM(S): 20 TABLET ORAL at 13:38

## 2025-03-09 RX ADMIN — GABAPENTIN 300 MILLIGRAM(S): 400 CAPSULE ORAL at 22:53

## 2025-03-09 RX ADMIN — QUETIAPINE FUMARATE 200 MILLIGRAM(S): 25 TABLET ORAL at 22:53

## 2025-03-09 RX ADMIN — ATORVASTATIN CALCIUM 40 MILLIGRAM(S): 80 TABLET, FILM COATED ORAL at 22:54

## 2025-03-09 RX ADMIN — Medication 750 MILLIGRAM(S): at 13:38

## 2025-03-09 RX ADMIN — APIXABAN 5 MILLIGRAM(S): 2.5 TABLET, FILM COATED ORAL at 17:58

## 2025-03-09 RX ADMIN — Medication 50 MILLIGRAM(S): at 12:41

## 2025-03-09 RX ADMIN — Medication 3 MILLIGRAM(S): at 22:54

## 2025-03-09 RX ADMIN — CYCLOBENZAPRINE HYDROCHLORIDE 5 MILLIGRAM(S): 15 CAPSULE, EXTENDED RELEASE ORAL at 13:38

## 2025-03-09 RX ADMIN — QUETIAPINE FUMARATE 25 MILLIGRAM(S): 25 TABLET ORAL at 17:58

## 2025-03-09 RX ADMIN — GABAPENTIN 300 MILLIGRAM(S): 400 CAPSULE ORAL at 13:38

## 2025-03-09 RX ADMIN — CYCLOBENZAPRINE HYDROCHLORIDE 5 MILLIGRAM(S): 15 CAPSULE, EXTENDED RELEASE ORAL at 22:54

## 2025-03-09 NOTE — OCCUPATIONAL THERAPY INITIAL EVALUATION ADULT - GENERAL OBSERVATIONS, REHAB EVAL
Pt found semi-supine in bed in NAD, all lines intact + left LE boot donned. Pt OK to be seen for OT per ALEYDA Rodriguez. SpO2 99% on RA.

## 2025-03-09 NOTE — H&P ADULT - PROBLEM SELECTOR PLAN 6
- pt with reported fracture in L leg, in cast and is being evaluated for it on the OP basis  - reports at times with changing of the dressing, there is still pain and feels "bone is sticking out"   - was planned for xray at the facility, unable to get done  - given this, get xray of the L foot and tibia/fibula for further eval   - OP pain control regimen: percocet 5-325mg BID standing, gabapentin 300mg TID, flexiril 5mg TID  - c/w gabapentin and flexiril standing with hold paramters for ams/sedation/lethargy   - favor percocet prn tid for pain relief than standing dose currently

## 2025-03-09 NOTE — H&P ADULT - PROBLEM SELECTOR PLAN 3
chronic  c/w home carafate 1g BID    #Dysuria  - reports of burning on urination  - previous hx of uti recently as well with prior urine cx from 2/17 contaminated and with previous hx of utis as well  - check UA   - monitor for worsening sx. Afebrile, no wbc count. consider for abx based on UA results.

## 2025-03-09 NOTE — CONSULT NOTE ADULT - ATTENDING COMMENTS
I agree with above exam, assessment and plan unless noted below,  Ms. Marte is a 39 year old right handed woman with a PMHx of obesity, factor V Leiden deficiency a/w DVT/PE (on Eliquis 5mg BID), bipolar disorder, hypothyroidism, PNES, IBS, GERD, PID, endometriosis, asthma, personality disorder, and mild intellectual disability who presented to [facility] ED from group home to [facility] ER due to bilateral lower extremity numbness and weakness.  Patient will benefit from further work up to rule out treatable etiologies. Further workup and management will be guided by pending results.  MRI brain   Continue anticoagulation   Continue medical management, neuro- check and fall precaution.  GI and DVT prophylaxis.  Outpatient EMG and NCV rule out neuropathy.   I discussed the diagnosis, treatment plan and prognosis with the patient.  All questions and concerns were addressed. The patient demonstrated good understanding of the treatment plan.  My cumulative time spent on the care of this patient was 80 minutes.  If you have any further questions, please do not hesitate to contact our team.  Thank you for allowing us to participate in this patient care.

## 2025-03-09 NOTE — ED ADULT NURSE NOTE - DOES PATIENT HAVE ADVANCE DIRECTIVE
Called Pt to reschedule his Jan 8 since it was scheduled incorrectly and to offer a sooner appt. Appt has been canceled. I was unable to speak with him but left a vm.    No

## 2025-03-09 NOTE — CONSULT NOTE ADULT - REASON FOR ADMISSION
"PATIENTINFORMATION    Date of Office Visit: 10/02/2023  Encounter Provider: Gal Stiles MD  Place of Service: [facility] CARDIOLOGY  Patient Name: Noe Pike  : 1974    Subjective:     Encounter Date:10/02/2023      Patient ID: Noe Pike is a 49 y.o. male.    No chief complaint on file.    HPI  Mr. Pike is a 49 years old gentleman came to cardiology clinic for follow-up visit.  He was accompanied by his wife.  He denies any recurrence of shortness of breath or chest discomfort but reports chronic fatigue.  Exercise is fairly using home gym equipment and also weightlifting.  Denies significant limiting symptoms during exercise.  No ER visit or hospitalization since last clinic visit.        ROS  All systems reviewed and negative except as noted in HPI.    Past Medical History:   Diagnosis Date    Anxiety     Chronic back pain     GERD (gastroesophageal reflux disease)     Migraine     Seizure disorder        Past Surgical History:   Procedure Laterality Date    SPLENECTOMY         Social History     Socioeconomic History    Marital status:    Tobacco Use    Smoking status: Never   Vaping Use    Vaping Use: Never used   Substance and Sexual Activity    Alcohol use: No    Drug use: No    Sexual activity: Yes     Partners: Female       History reviewed. No pertinent family history.      Procedures       Objective:     /70 (BP Location: Left arm)   Pulse 76   Ht 170.2 cm (67\")   Wt 82 kg (180 lb 11.2 oz)   BMI 28.30 kg/m²  Body mass index is 28.3 kg/m².     Constitutional:       General: Not in acute distress.     Appearance: Well-developed. Not diaphoretic.   Eyes:      Pupils: Pupils are equal, round, and reactive to light.   HENT:      Head: Normocephalic and atraumatic.   Neck:      Thyroid: No thyromegaly.   Pulmonary:      Effort: Pulmonary effort is normal. No respiratory distress.      Breath sounds: Normal breath sounds. No wheezing. No rales. "   Chest:      Chest wall: Not tender to palpatation.   Cardiovascular:      Normal rate. Regular rhythm.      No gallop.    Pulses:     Intact distal pulses.   Edema:     Peripheral edema absent.   Abdominal:      General: Bowel sounds are normal. There is no distension.      Palpations: Abdomen is soft.      Tenderness: There is no guarding.   Musculoskeletal: Normal range of motion.         General: No deformity.      Cervical back: Normal range of motion and neck supple. Skin:     General: Skin is warm and dry.      Findings: No rash.   Neurological:      Mental Status: Alert and oriented to person, place, and time.      Cranial Nerves: No cranial nerve deficit.      Deep Tendon Reflexes: Reflexes are normal and symmetric.   Psychiatric:         Judgment: Judgment normal.       Review Of Data: I have reviewed pertinent recent labs, images and documents and pertinent findings included in HPI or assessment below.          Assessment/Plan:         History of exertional shortness of breath and chest discomfort-normal 9-minute stress echo in October 2022.  No significant recurrence of chest discomfort but he gets intermittent seasonal shortness of breath from asthma/allergies  Hypercholesterolemia-follows up with his PCP  Fatigue with some symptoms concerning for DIVYA-I have ordered home sleep study but advised him to follow-up with PCP for work-up for fatigue.  He will continue to exercise.  Seizure disorder on treatment      Follow-up with cardiology clinic as needed.    Diagnosis and plan of care discussed with patient and verbalized understanding.            Your medication list            Accurate as of October 2, 2023  4:08 PM. If you have any questions, ask your nurse or doctor.                CONTINUE taking these medications        Instructions Last Dose Given Next Dose Due   citalopram 20 MG tablet  Commonly known as: CeleXA      Take 1 tablet by mouth Daily.       DILANTIN PO      Take 100 mg by mouth 2  (Two) Times a Day.       famotidine 20 MG tablet  Commonly known as: PEPCID      Take 1 tablet by mouth Every 12 (Twelve) Hours.       HYDROcodone-acetaminophen 7.5-325 MG per tablet  Commonly known as: NORCO      Take 1 tablet by mouth Every 6 (Six) Hours As Needed for Moderate Pain.       phenytoin  MG capsule  Commonly known as: DILANTIN      Take 2 capsules by mouth Every 12 (Twelve) Hours.                    Gal Stiles MD  10/02/23  16:08 EDT     LE and arm numbness, further neurological evaluation

## 2025-03-09 NOTE — PATIENT PROFILE ADULT - FALL HARM RISK - HARM RISK INTERVENTIONS

## 2025-03-09 NOTE — H&P ADULT - NSHPPHYSICALEXAM_GEN_ALL_CORE
Vitals:  Vital Signs Last 24 Hrs  T(C): 36.2 (09 Mar 2025 10:45), Max: 36.8 (09 Mar 2025 10:13)  T(F): 97.1 (09 Mar 2025 10:45), Max: 98.2 (09 Mar 2025 10:13)  HR: 92 (09 Mar 2025 10:45) (88 - 93)  BP: 113/86 (09 Mar 2025 10:45) (113/86 - 120/70)  BP(mean): --  RR: 20 (09 Mar 2025 10:45) (18 - 20)  SpO2: 100% (09 Mar 2025 10:45) (98% - 100%)    Parameters below as of 09 Mar 2025 10:45  Patient On (Oxygen Delivery Method): room air      Oxygenation Index= Unable to calculate   [Based on FiO2 = Unknown, PaO2 = Unknown, MAP = Unknown]  Orthostatic VS      I&O's Summary    CAPILLARY BLOOD GLUCOSE      POCT Blood Glucose.: 85 mg/dL (09 Mar 2025 04:07)      PHYSICAL EXAM:  GENERAL: NAD, lying in bed comfortably, on RA, no accessory mm use, able to talk in complete sentences without tachypnea  HEENT: NC/AT, EOMI, PERRL, MMM, no pharyngeal erythema, no cervical LAD  NECK: Supple, No JVD  CHEST/LUNG: CTAB, no increased WOB  HEART: regular, tachycardic, no m/r/g  ABDOMEN: soft, NT, ND, BS+; no bladder tenderness   EXTREMITIES:  2+ peripheral pulses, no gross edema appreciated on RLE, LLE in boot and wrapped, not able to properly tell   NERVOUS SYSTEM/MSK:  A&Ox4, CN II-XII intact, face symmetric, speech fluent. RUE 5/5 strength, RLE mildly antigravity 3/5 in nature, sensation intact in R arm, reports reduced sensation in L arm and R leg (not able to assess sensation on L leg). L leg = not able to move much / lift. L arm mildly able to move but not antigravity during assessment (however outside of assessment, did notice pt able to move LUE a bit more independently)  Gait: in boot, pt declined reports being in wheelchair   MSK: FROM all 4 extremities, full and equal strength  SKIN: R cubital area redness from prior blood draw, non tender to touch

## 2025-03-09 NOTE — ED ADULT TRIAGE NOTE - CHIEF COMPLAINT QUOTE
As per EMT[facility]. [facility]o jessika leg numbness since 6pm yesterday and headache. ." Pt st" I have headache since last night but Just now around 3:45 I am dizzy , I now have left arm numbness and weakness on left side.blurry vision  ." Hx Stroke 2019 with residual left arm and weakness. Bed bound. Pt on Eloquis

## 2025-03-09 NOTE — CONSULT NOTE ADULT - SUBJECTIVE AND OBJECTIVE BOX
[facility] DEPARTMENT OF OPHTHALMOLOGY - INITIAL ADULT CONSULT  ----------------------------------------------------------------------------------------------------  Juan Salgado MD PGY-3  Available on teams  ----------------------------------------------------------------------------------------------------    HPI:  Ms. Marte is a 39 year-old right-handed woman with a PMH of obesity, factor V Leiden deficiency a/w DVT/PE (on Eliquis 5mg BID), bipolar disorder, hypothyroidism, PNES, IBS, GERD, PID, endometriosis, asthma, personality disorder,  mild intellectual disability, CVA (2019) w. residual L sided weakness who presents from rehab at [facility] with b/l Leg numbness and headache, L arm numbness/weakness with L sided blurred vision with LKW on 3/8/25 around 1800 hours. Pt reports that she was in her usual state of health when she noted that later in the evening began to have LLE numbness, followed by L face and L arm numbness. Pt reports additionally, she was experiencing more heaviness in R leg and not be able to be as antigravity as earlier, dizziness, blurriness in vision, and some non specific generalized headache. Reports at baseline when she is better, pt self reports she is more wheelchair dependent; has had R hand swelling chronically for years, chronic lymphedema in LE as well and previous hx of b/l LE weakness and numbness as well but reports this is worse than her baseline where she can move her R and leg and arm well and antigravity. No able to move L leg at all and L arm is more antigravity / able to life more.     Pt seen in ED on multiple occassions, in no distress, comfortable. Did have some itchiness around previous blood draw/iv draw site, requesting benadryl (she does it regularly at baseline nightly along with prn) which was given and later reported some improvement. She was able to pass bedside dysphagia screen and be able to eat food well, no issues with it. still reporting blurred vision, no active cp/sob/abd pain/n/v/d/c, and reports her sx from yesterday and still overnight not fully better. Reports burning on urination, no urinary frequency. +BM. Of note, prior to assessment, did appreciate pt being able to more mobile/antigravity with the L arm.     Pt has been admitted for L sided weakness in Jan , found to have hypoglycemic event, no recurrence since on that admission and discharged on Jan 15th, Since then, has had recurrent presentations to the ED for cc of UTI, increased anxiety, again hospitalization from Feb 1st-6th for constipation and hip pain and pain med adjusted and was discharged to [facility]. Since the last hospitalization, presented to the ED Feb 15-16 for LLQ pain with CT imaging neg for PE, no acute abd path, fall with CT imaging of head and unimpressive for acute intracranial bleeding/acute fx, knee/elbow xrays neg for acute fx/dislocation as well. Reported to have L leg fx, cast in place, has been on percocet standing TID along with flexiril standing TID for pain relief.     ED course: afebrile, VSS with BP in 110s-120s/70. Was a stroke code in the ED. CT brain stroke protocol without acute intracranial hemorrhage, mass effect, or midline shift. CT perfusion with technical limitations: motion and suboptimal arterial and blood and venous outflow functions, CTA head and neck with LVO or stenosis/vascular abnormality. Labs notable for: VBG 7.33/53/30/28, lactate 1.6, CBC notable for WBC 4.68, Hb 11.7, Plt 134K, CMP wnl, hepatic panel with mild ALT elevation at 35, bHCG neg, coags wnl, BG 85. Seen by PT/OT in the ED and rec [facility], back to rehab facility.     Admitted for further workup and evaluation.    (09 Mar 2025 14:39)    Interval History: 1d of bilateral blurry vision. no pain.    PAST MEDICAL & SURGICAL HISTORY:  Asthma      Seizure      Factor V Leiden      Bipolar disorder      Endometriosis      History of DVT in adulthood  RLE on eliquis      Hypothyroid      Seasonal allergies      Insomnia      GERD (gastroesophageal reflux disease)      HTN (hypertension)      Diabetes      S/P appendectomy        Past Ocular History: glaucoma suspect  Ophthalmic Medications: none  FAMILY HISTORY:  Family history of DVT (Mother)      MEDICATIONS  (STANDING):  apixaban 5 milliGRAM(s) Oral two times a day  atorvastatin 40 milliGRAM(s) Oral at bedtime  cyclobenzaprine 5 milliGRAM(s) Oral three times a day  diphenhydrAMINE 50 milliGRAM(s) Oral at bedtime  divalproex  milliGRAM(s) Oral two times a day  escitalopram 10 milliGRAM(s) Oral daily  gabapentin 300 milliGRAM(s) Oral every 8 hours  levothyroxine 100 MICROGram(s) Oral daily  melatonin 3 milliGRAM(s) Oral at bedtime  polyethylene glycol 3350 17 Gram(s) Oral daily  QUEtiapine 200 milliGRAM(s) Oral at bedtime  QUEtiapine 25 milliGRAM(s) Oral daily  sucralfate 1 Gram(s) Oral two times a day    MEDICATIONS  (PRN):  acetaminophen     Tablet .. 650 milliGRAM(s) Oral every 8 hours PRN Temp greater or equal to 38C (100.4F), Mild Pain (1 - 3)  albuterol    90 MICROgram(s) HFA Inhaler 2 Puff(s) Inhalation every 6 hours PRN for shortness of breath and/or wheezing  oxycodone    5 mG/acetaminophen 325 mG 1 Tablet(s) Oral every 8 hours PRN Severe Pain (7 - 10)  senna 2 Tablet(s) Oral at bedtime PRN Constipation    Allergies & Intolerances:   morphine (Unknown)  penicillin (Hives)  ibuprofen (Pruritus)  latex (Blisters)  [This allergen will not trigger allergy alert] pineapple allergenic extract (Swelling)  Zofran (Hives)  trazodone (Other)  TraZODone Hydrochloride (Unknown)  [This allergen will not trigger allergy alert] Toradol SOLN (Unknown)  Toradol (Hives)  TraMADol Hydrochloride (Unknown)    Review of Systems:  Constitutional: No fever, chills  Eyes: blurry vision OU  Neuro: No tremors  Cardiovascular: No chest pain, palpitations  Respiratory: No SOB, no cough  GI: No nausea, vomiting, abdominal pain  : No dysuria  Skin: no rash  Psych: no depression  Endocrine: no polyuria, polydipsia  Heme/lymph: no swelling    VITALS: T(C): 36.4 (03-09-25 @ 15:21)  T(F): 97.5 (03-09-25 @ 15:21), Max: 98.2 (03-09-25 @ 10:13)  HR: 98 (03-09-25 @ 15:21) (88 - 98)  BP: 126/80 (03-09-25 @ 15:21) (113/86 - 126/80)  RR:  (18 - 20)  SpO2:  (98% - 100%)  Wt(kg): --  General: AAO x 3, appropriate mood and affect    Ophthalmology Exam:  Visual acuity (sc): 20/200 PHNI OD, 20/100 PHNI OS  Pupils: PERRL OU, no APD  Ttono: 12 OD, 13 OS  Extraocular movements (EOMs): Full OU, no pain, no diplopia  Confrontational Visual Field (CVF): Pt unable, possibly 2/2 lack of cooperation  Color Plates: 8/12 OD, 4/12 OS    Pen Light Exam (PLE)  External: Flat OU  Lids/Lashes/Lacrimal Ducts: Flat OU    Sclera/Conjunctiva: W+Q OU  Cornea: Cl OU  Anterior Chamber: D+F OU    Iris: Flat OU  Lens: Cl OU    Fundus Exam: dilated with 1% tropicamide and 2.5% phenylephrine  Approval obtained from primary team for dilation  Patient aware that pupils can remained dilated for at least 4-6 hours  Exam performed with 20D lens    Vitreous: wnl OU  Disc, cup/disc: sharp and pink, 0.7 OU  Macula: wnl OU  Vessels: wnl OU  Periphery: wnl OU    Labs/Imaging:  ***

## 2025-03-09 NOTE — PHYSICAL THERAPY INITIAL EVALUATION ADULT - ADDITIONAL COMMENTS
?poor historian. Pt stated she was admitted from a rehab facility, however typically resides in a group home. Pt stated she hasn't ambulated in "months" and has been using a wheelchair.     Pt. left comfortable in stretcher, head of the stretcher elevated, all needs in reach and in NAD. RNJennifer, aware of session and pt current position.

## 2025-03-09 NOTE — ED ADULT NURSE REASSESSMENT NOTE - NS ED NURSE REASSESS COMMENT FT1
Patient received into my care at 0815hrs, from Loretta RN night shift. Patient is alert and oriented x 4. Left leg splinted, PT in to see patient. Patient complaining of dizziness and pain requesting her regular medication be given to her. Bed in lowest position side rails up for safety. Plan of care in progress.

## 2025-03-09 NOTE — ED ADULT NURSE NOTE - CHIEF COMPLAINT QUOTE
As per EMT" Pt coming from [facility]. She c/o jessika leg numbness since 6pm yesterday and headache. ." Pt st" I have headache since last night but Just now around 3:45 I am dizzy , I now have left arm numbness and weakness on left side.blurry vision  ." Hx Stroke 2019 with residual left arm and weakness. Bed bound. Pt on Eloquis

## 2025-03-09 NOTE — OCCUPATIONAL THERAPY INITIAL EVALUATION ADULT - LEVEL OF INDEPENDENCE: STAND/SIT, REHAB EVAL
unable to perform; deferred due to environmental limitations and Pt complaint of pain/unable to perform

## 2025-03-09 NOTE — H&P ADULT - PROBLEM SELECTOR PLAN 2
pt reporting blurred vision since yesterday when the presenting sx occurred. on assessment, not able to properly differentiate.  Ophthalmology consulted for further input; recs and guidance appreciated

## 2025-03-09 NOTE — H&P ADULT - PROBLEM SELECTOR PLAN 1
Detail Level: Detailed Quality 226: Preventive Care And Screening: Tobacco Use: Screening And Cessation Intervention: Patient screened for tobacco use and is an ex/non-smoker Quality 47: Advance Care Plan: Advance Care Planning discussed and documented; advance care plan or surrogate decision maker documented in the medical record. - Pt with reports of b/l Leg numbness and headache, L arm numbness/weakness with L sided blurred vision with LKW on 3/8/25 around 1800 hours. Prior hx of CVA with weakness in L side as well.   - Reports at baseline: can move her R and leg and arm well and antigravity. No able to move L leg at all and L arm is more antigravity / able to life more.   - Stroke code in the ED and CT brain stroke protocol without acute intracranial hemorrhage, mass effect, or midline shift. CT perfusion with technical limitations: motion and suboptimal arterial and blood and venous outflow functions, CTA head and neck with LVO or stenosis/vascular abnormality.   - Neuro recs appreciated and ddx: acute ischemic stroke due to new fidnings on exam (lower suspicion) vs functional   - Given pt's prior hx and comorbidities, favor obtaining MRI w/o contrast for further investigation.    TTE with bubble study and telemetry to look for a cardiac source of embolism   - HbA1C and Lipid Panel. Continue home Eliquis as per neurology given no acute bleed noted on CT imaging.   - Telemonitoring; Neurochecks and vital signs Q4H  - Permissive HTN up to 220/120 mmHg for first 24 hours after symptom onset followed by gradual normotension.   - BG goal <180, avoid hypoglycemia  - Fall, aspiration precautions  - more for worsening headaches/dizziness/vision changes. Stat CTH should new neurological changes arise.

## 2025-03-09 NOTE — OCCUPATIONAL THERAPY INITIAL EVALUATION ADULT - DIAGNOSIS, OT EVAL
Pt with impaired cognition, left UE ROM and sensation, strength, and endurance impacting ability to complete ADLs, IADLs, functional mobility/transfers.

## 2025-03-09 NOTE — ED PROVIDER NOTE - PROGRESS NOTE DETAILS
Jo Contreras MD, PGY3:  Neuro recommends admission for further stroke workup and evaluation.  Patient consents to admission.

## 2025-03-09 NOTE — H&P ADULT - PROBLEM SELECTOR PLAN 4
chronic with hx of dvt/pe in the past  c/w eliquis 5mg BIID. d/w neuro and in agreement with c/w AC for the patient based on current imaging    #HLD - c/w home lipitor 40mg daily (no crestor listed on facility med list)  #Insomnia - c/w benadryl 50mg qhs (pt's scheduled medication and takes it baseline regularly)   #constipation - senna / miralax for bowel regimen

## 2025-03-09 NOTE — OCCUPATIONAL THERAPY INITIAL EVALUATION ADULT - PERTINENT HX OF CURRENT PROBLEM, REHAB EVAL
As per H&P: "Assessment: 39RHF with a PMHx of obesity, factor V Leiden deficiency a/w DVT/PE (on Eliquis 5mg BID), bipolar disorder, hypothyroidism, PNES, IBS, GERD, PID, endometriosis, asthma, personality disorder, and mild intellectual disability who presented to [facility] ED from group home on 3/9/2025 as code stroke due to bilateral lower extremity numbness. CTH on 3/9/2025 nonacute, CTA H/N with no LVO, CTP with no core infarction, and penumbra likely artifactual."  Admit dx: Facial weakness, code stroke

## 2025-03-09 NOTE — PHYSICAL THERAPY INITIAL EVALUATION ADULT - PERTINENT HX OF CURRENT PROBLEM, REHAB EVAL
Pt is a 39 year old female with history of mild intellectual disability, further history below who presented as code stroke due to bilateral lower extremity numbness.

## 2025-03-09 NOTE — H&P ADULT - PROBLEM SELECTOR PLAN 5
- Hx of CVA in past w. residual L sided weakness  - Reports at baseline when she is better, pt self reports she is more wheelchair dependent; has had R hand swelling chronically for years, chronic lymphedema in LE  - PT / OT eval noted in ED and rec for LOY  - fall precautions

## 2025-03-09 NOTE — H&P ADULT - HISTORY OF PRESENT ILLNESS
Ms. Marte is a 39 year-old right-handed woman with a PMH of obesity, factor V Leiden deficiency a/w DVT/PE (on Eliquis 5mg BID), bipolar disorder, hypothyroidism, PNES, IBS, GERD, PID, endometriosis, asthma, personality disorder,  mild intellectual disability, CVA (2019) w. residual L sided weakness who [facility] Leg numbness and headache, L arm numbness/weakness with L sided blurred vision with LKW on 3/8/25 around 1800 hours. Pt reports that she was in her usual state of health when she noted that later in the evening began to have LLE numbness, followed by L face and L arm numbness. Pt reports additionally, she was experiencing more heaviness in R leg and not be able to be as antigravity as earlier, dizziness, blurriness in vision, and some non specific generalized headache. Reports at baseline when she is better, pt self reports she is more wheelchair dependent; has had R hand swelling chronically for years, chronic lymphedema in LE as well and previous hx of b/l LE weakness and numbness as well but reports this is worse than her baseline where she can move her R and leg and arm well and antigravity. No able to move L leg at all and L arm is more antigravity / able to life more.     Pt seen in ED on multiple occassions, in no distress, comfortable. Did have some itchiness around previous blood draw/iv draw site, requesting benadryl (she does it regularly at baseline nightly along with prn) which was given and later reported some improvement. She was able to pass bedside dysphagia screen and be able to eat food well, no issues with it. still reporting blurred vision, no active cp/sob/abd pain/n/v/d/c, and reports her sx from yesterday and still overnight not fully better. Reports burning on urination, no urinary frequency. +BM. Of note, prior to assessment, did appreciate pt being able to more mobile/antigravity with the L arm.     Pt has been admitted for L sided weakness in Jan , found to have hypoglycemic event, no recurrence since on that admission and discharged on Jan 15th, Since then, has had recurrent presentations to the ED for cc of UTI, increased anxiety, again hospitalization from Feb 1st-6th for constipation and hip pain and pain med adjusted and was discharged to [facility]. Since the last hospitalization, presented to the ED Feb 15-16 for LLQ pain with CT imaging neg for PE, no acute abd path, fall with CT imaging of head and unimpressive for acute intracranial bleeding/acute fx, knee/elbow xrays neg for acute fx/dislocation as well. Reported to have L leg fx, cast in place, has been on percocet standing TID along with flexiril standing TID for pain relief.     ED course: afebrile, VSS with BP in 110s-120s/70. Was a stroke code in the ED. CT brain stroke protocol without acute intracranial hemorrhage, mass effect, or midline shift. CT perfusion with technical limitations: motion and suboptimal arterial and blood and venous outflow functions, CTA head and neck with LVO or stenosis/vascular abnormality. Labs notable for: VBG 7.33/53/30/28, lactate 1.6, CBC notable for WBC 4.68, Hb 11.7, Plt 134K, CMP wnl, hepatic panel with mild ALT elevation at 35, bHCG neg, coags wnl, BG 85. Seen by PT/OT in the ED and rec [facility], back to rehab facility.     Admitted for further workup and evaluation.

## 2025-03-09 NOTE — CONSULT NOTE ADULT - SUBJECTIVE AND OBJECTIVE BOX
Neurology - Consult Note    -  Spectra: [phone] ([facility]), [phone] ([facility])  -    HPI: Patient YODIT MORROW is a 39y (1985) wo/man with a PMHx significant for ***      Review of Systems:  INCOMPLETE   CONSTITUTIONAL: No fevers or chills  EYES AND ENT: No visual changes or no throat pain   NECK: No pain or stiffness  RESPIRATORY: No hemoptysis or shortness of breath  CARDIOVASCULAR: No chest pain or palpitations  GASTROINTESTINAL: No melena or hematochezia  GENITOURINARY: No dysuria or hematuria  NEUROLOGICAL: +As stated in HPI above  SKIN: No itching, burning, rashes, or lesions   All other review of systems is negative unless indicated above.    Allergies:  penicillin (Hives)  ibuprofen (Pruritus)  latex (Blisters)  [This allergen will not trigger allergy alert] pineapple allergenic extract (Swelling)  Zofran (Hives)  trazodone (Other)  TraZODone Hydrochloride (Unknown)  [This allergen will not trigger allergy alert] Toradol SOLN (Unknown)  Toradol (Hives)  TraMADol Hydrochloride (Unknown)      PMHx/PSHx/Family Hx: As above, otherwise see below   Asthma    Seizure    Factor V Leiden    Bipolar disorder    Endometriosis    History of DVT in adulthood    Seizures    Hypothyroid    Seasonal allergies    Insomnia    GERD (gastroesophageal reflux disease)    HTN (hypertension)    Diabetes    DVT (deep venous thrombosis)    Factor V Leiden        Social Hx:  No current use of tobacco, alcohol, or illicit drugs  Lives with ***    Medications:  MEDICATIONS  (STANDING):    MEDICATIONS  (PRN):      Vitals:  T(C): 36.5 (03-09-25 @ 04:00), Max: 36.5 (03-09-25 @ 04:00)  HR: 93 (03-09-25 @ 04:00) (93 - 93)  BP: 119/83 (03-09-25 @ 04:00) (119/83 - 119/83)  RR: 18 (03-09-25 @ 04:00) (18 - 18)  SpO2: 98% (03-09-25 @ 04:00) (98% - 98%)    Physical Examination: INCOMPLETE  General - NAD  Cardiovascular - Peripheral pulses palpable, no edema  Eyes - Fundoscopy with flat, sharp optic discs and no hemorrhage or exudates; Fundoscopy not well visualized; Fundoscopy not performed due to safety precautions in the setting of the COVID-19 pandemic    Neurologic Exam:  Mental status - Eyes open, attending to examiner. Awake, Alert, Oriented to person, place, and time. Speech fluent, repetition and naming intact. Follows simple and complex commands. Attention/concentration, recent and remote memory (including registration and recall), and fund of knowledge intact    Cranial nerves - PERRLA, VFF, EOMI, face sensation (V1-V3) intact b/l, facial strength intact without asymmetry b/l, hearing intact b/l, palate with symmetric elevation, trapezius 5/5 strength b/l, tongue midline on protrusion with full lateral movement    Motor - Normal bulk and tone throughout. No pronator drift.  Strength testing            Deltoid      Biceps      Triceps     Wrist Extension    Wrist Flexion     Interossei         R            5                 5               5                     5                            5                        5                 5  L             5                 5               5                     5                            5                        5                 5              Hip Flexion    Hip Extension    Knee Flexion    Knee Extension    Dorsiflexion    Plantar Flexion  R              5                           5                       5                           5                            5                          5  L              5                           5                        5                           5                            5                          5    Sensation - Light touch intact throughout    DTR's -             Biceps      Triceps     Brachioradialis      Patellar    Ankle    Toes/plantar response  R             2+             2+                  2+                       2+            2+                 Down  L              2+             2+                 2+                        2+           2+                 Down    Coordination - Finger to Nose intact b/l. No tremors appreciated    Gait and station - Normal casual gait. Romberg (-)    Labs:          CAPILLARY BLOOD GLUCOSE      POCT Blood Glucose.: 85 mg/dL (09 Mar 2025 04:07)          CSF:                  Radiology:     Neurology - Consult Note    -  Spectra: [phone] ([facility]), [phone] ([facility])  -    HPI: Patient YODIT MORROW is a 39y (1985) wo/man with a PMHx significant for ***      Review of Systems:    CONSTITUTIONAL: No fevers or chills  EYES AND ENT: + blurry vision in both eyes, no throat pain   NECK: No pain or stiffness  RESPIRATORY: No hemoptysis or shortness of breath  CARDIOVASCULAR: No chest pain or palpitations  GASTROINTESTINAL: No melena or hematochezia  GENITOURINARY: No dysuria or hematuria  NEUROLOGICAL: +As stated in HPI above  SKIN: No itching or burning  All other review of systems is negative unless indicated above.    Allergies:  penicillin (Hives)  ibuprofen (Pruritus)  latex (Blisters)  [This allergen will not trigger allergy alert] pineapple allergenic extract (Swelling)  Zofran (Hives)  trazodone (Other)  TraZODone Hydrochloride (Unknown)  [This allergen will not trigger allergy alert] Toradol SOLN (Unknown)  Toradol (Hives)  TraMADol Hydrochloride (Unknown)      PMHx/PSHx/Family Hx: As above, otherwise see below   Asthma    Seizure    Factor V Leiden    Bipolar disorder    Endometriosis    History of DVT in adulthood    Seizures    Hypothyroid    Seasonal allergies    Insomnia    GERD (gastroesophageal reflux disease)    HTN (hypertension)    Diabetes    DVT (deep venous thrombosis)    Factor V Leiden        Social Hx:  No current use of tobacco, alcohol, or illicit drugs  Lives in group home    Medications:  MEDICATIONS  (STANDING):    MEDICATIONS  (PRN):      Vitals:  T(C): 36.5 (03-09-25 @ 04:00), Max: 36.5 (03-09-25 @ 04:00)  HR: 93 (03-09-25 @ 04:00) (93 - 93)  BP: 119/83 (03-09-25 @ 04:00) (119/83 - 119/83)  RR: 18 (03-09-25 @ 04:00) (18 - 18)  SpO2: 98% (03-09-25 @ 04:00) (98% - 98%)    Physical Examination:   General - NAD, obese female, left lower extremity in brace and cast  Cardiovascular -  no edema    Neurologic Exam:  Mental status - Eyes open, attending to examiner. Awake, Alert, oriented to person but not to place, time, or age. Speech fluent, repetition and naming intact. Follows simple commands.     Cranial nerves - PERRL, visual fields intact to threat b/l, EOMI, decreased sensation to cold temperature on left V1-V3, right facial droop noted while smiling, no overt facial asymmetry at rest, hearing intact b/l, palate with symmetric elevation, trapezius 5/5 strength b/l, tongue midline on protrusion with full lateral movement    Motor - Normal bulk throughout.   Strength testing  LUE: does not maintain antigravity, limb falls to bed immediately  RUE: maintains antigravity for at least 10 seconds, no drift  LLE: limited due to presence of brace/cast, but maintains antigravity  RLE: maintains antigravity for at least 5 seconds    Sensation - Decreased sensation to cold temperature in left arm and left leg    Coordination - Finger to Nose intact b/l. No tremors appreciated. CRYSTAL HTS due to cast in place.     Gait and station - Not tested due to cast in place.     Labs:          CAPILLARY BLOOD GLUCOSE      POCT Blood Glucose.: 85 mg/dL (09 Mar 2025 04:07)          CSF:                  Radiology:  CT Brain Stroke Protocol (03.09.25 @ 04:21) >  No acute intracranial hemorrhage, mass effect, or midline shift. If   clinical symptoms persist or worsen, more sensitive evaluation with brain   MRI may be obtained, if no contraindications exist.       Neurology - Consult Note    -  Spectra: [phone] ([facility]), [phone] ([facility])  -    HPI: 39RHF with a PMHx of obesity, factor V Leiden deficiency a/w DVT/PE (on Eliquis 5mg BID), bipolar disorder, hypothyroidism, PNES, IBS, GERD, PID, endometriosis, asthma, personality disorder, and mild intellectual disability who presented to [facility] ED from group home on 3/9/2025 as code stroke due to bilateral lower extremity numbness. LKW on 3/8/2025 at around 18:00. Patient reports that she first started experiencing left lower extremity numbness, followed by left face and arm numbness. Patient reports history of stroke in 2022 (states was at outside hospital) with residual left-sided weakness, however states that at baseline she is able to move her leg and walk independently. Patient takes Eliquis for history DVT/PE and reports last dose at 19:30 on 3/8/2025. Of note, patient recently fell and fractured her left leg, which is currently in a brace. Patient was previously seen as a stroke code at [facility] in 9/2024 due to left lower extremity numbness. CTH/CTA H/N in 9/2024 were unremarkable, and MRI brain revealed nonspecific punctate foci of hyperintensity on FLAIR  in the subcortical white matter of the bilateral cerebral hemispheres and was otherwise unremarkable. Due to concern for possible spinal cord dysfunction, MRI cervical, thoracic, and lumbar spine were obtained, which were all unremarkable for spinal pathology or demyelinating disease, and peripheral etiology.       Review of Systems:    CONSTITUTIONAL: No fevers or chills  EYES AND ENT: + blurry vision in both eyes, no throat pain   NECK: No pain or stiffness  RESPIRATORY: No hemoptysis or shortness of breath  CARDIOVASCULAR: No chest pain or palpitations  GASTROINTESTINAL: No melena or hematochezia  GENITOURINARY: No dysuria or hematuria  NEUROLOGICAL: +As stated in HPI above  SKIN: No itching or burning  All other review of systems is negative unless indicated above.    Allergies:  penicillin (Hives)  ibuprofen (Pruritus)  latex (Blisters)  [This allergen will not trigger allergy alert] pineapple allergenic extract (Swelling)  Zofran (Hives)  trazodone (Other)  TraZODone Hydrochloride (Unknown)  [This allergen will not trigger allergy alert] Toradol SOLN (Unknown)  Toradol (Hives)  TraMADol Hydrochloride (Unknown)      PMHx/PSHx/Family Hx: As above, otherwise see below   Asthma    Seizure    Factor V Leiden    Bipolar disorder    Endometriosis    History of DVT in adulthood    Seizures    Hypothyroid    Seasonal allergies    Insomnia    GERD (gastroesophageal reflux disease)    HTN (hypertension)    Diabetes    DVT (deep venous thrombosis)    Factor V Leiden        Social Hx:  No current use of tobacco, alcohol, or illicit drugs  Lives in group home    Medications:  MEDICATIONS  (STANDING):    MEDICATIONS  (PRN):      Vitals:  T(C): 36.5 (03-09-25 @ 04:00), Max: 36.5 (03-09-25 @ 04:00)  HR: 93 (03-09-25 @ 04:00) (93 - 93)  BP: 119/83 (03-09-25 @ 04:00) (119/83 - 119/83)  RR: 18 (03-09-25 @ 04:00) (18 - 18)  SpO2: 98% (03-09-25 @ 04:00) (98% - 98%)    Physical Examination:   General - NAD, obese female, left lower extremity in brace and cast  Cardiovascular -  no edema    Neurologic Exam:  Mental status - Eyes open, attending to examiner. Awake, Alert, oriented to person but not to place, time, or age. Speech fluent, repetition and naming intact. Follows simple commands.     Cranial nerves - PERRL, visual fields intact to threat b/l, EOMI, decreased sensation to cold temperature on left V1-V3, right facial droop noted while smiling, no overt facial asymmetry at rest, hearing intact b/l, palate with symmetric elevation, trapezius 5/5 strength b/l, tongue midline on protrusion with full lateral movement    Motor - Normal bulk throughout.   Strength testing  LUE: does not maintain antigravity, limb falls to bed immediately  RUE: maintains antigravity for at least 10 seconds, no drift  LLE: limited due to presence of brace/cast, but maintains antigravity  RLE: maintains antigravity for at least 5 seconds    Sensation - Decreased sensation to cold temperature in left arm and left leg    Coordination - Finger to Nose intact b/l. No tremors appreciated. CRYSTAL HTS due to cast in place.     Gait and station - Not tested due to cast in place.     Labs:          CAPILLARY BLOOD GLUCOSE      POCT Blood Glucose.: 85 mg/dL (09 Mar 2025 04:07)          CSF:                  Radiology:  CT Brain Stroke Protocol (03.09.25 @ 04:21) >  No acute intracranial hemorrhage, mass effect, or midline shift. If   clinical symptoms persist or worsen, more sensitive evaluation with brain   MRI may be obtained, if no contraindications exist.       Neurology - Consult Note    -  Spectra: [phone] ([facility]), [phone] ([facility])  -    HPI: 39RHF with a PMHx of obesity, factor V Leiden deficiency a/w DVT/PE (on Eliquis 5mg BID), bipolar disorder, hypothyroidism, PNES, IBS, GERD, PID, endometriosis, asthma, personality disorder, and mild intellectual disability who presented to [facility] ED from group home on 3/9/2025 as code stroke due to bilateral lower extremity numbness. LKW on 3/8/2025 at around 18:00. Patient reports that she first started experiencing left lower extremity numbness, followed by left face and arm numbness. Patient reports history of stroke in 2022 (states was at outside hospital) with residual left-sided weakness, however states that at baseline she is able to move her leg and walk independently. Patient takes Eliquis for history DVT/PE and reports last dose at 19:30 on 3/8/2025. Of note, patient recently fell and fractured her left leg, which is currently in a brace. Patient was previously seen as a stroke code at [facility] in 9/2024 due to left lower extremity numbness. CTH/CTA H/N in 9/2024 were unremarkable, and MRI brain revealed nonspecific punctate foci of hyperintensity on FLAIR  in the subcortical white matter of the bilateral cerebral hemispheres and was otherwise unremarkable. Due to concern for possible spinal cord dysfunction, MRI cervical, thoracic, and lumbar spine were obtained, which were all unremarkable for spinal pathology or demyelinating disease, and peripheral etiology.       Review of Systems:    CONSTITUTIONAL: No fevers or chills  EYES AND ENT: + blurry vision in both eyes, no throat pain   NECK: No pain or stiffness  RESPIRATORY: No hemoptysis or shortness of breath  CARDIOVASCULAR: No chest pain or palpitations  GASTROINTESTINAL: No melena or hematochezia  GENITOURINARY: No dysuria or hematuria  NEUROLOGICAL: +As stated in HPI above  SKIN: No itching or burning  All other review of systems is negative unless indicated above.    Allergies:  penicillin (Hives)  ibuprofen (Pruritus)  latex (Blisters)  [This allergen will not trigger allergy alert] pineapple allergenic extract (Swelling)  Zofran (Hives)  trazodone (Other)  TraZODone Hydrochloride (Unknown)  [This allergen will not trigger allergy alert] Toradol SOLN (Unknown)  Toradol (Hives)  TraMADol Hydrochloride (Unknown)      PMHx/PSHx/Family Hx: As above, otherwise see below   Asthma    Seizure    Factor V Leiden    Bipolar disorder    Endometriosis    History of DVT in adulthood    Seizures    Hypothyroid    Seasonal allergies    Insomnia    GERD (gastroesophageal reflux disease)    HTN (hypertension)    Diabetes    DVT (deep venous thrombosis)    Factor V Leiden        Social Hx:  No current use of tobacco, alcohol, or illicit drugs  Lives in group home    Medications:  MEDICATIONS  (STANDING):    MEDICATIONS  (PRN):      Vitals:  T(C): 36.5 (03-09-25 @ 04:00), Max: 36.5 (03-09-25 @ 04:00)  HR: 93 (03-09-25 @ 04:00) (93 - 93)  BP: 119/83 (03-09-25 @ 04:00) (119/83 - 119/83)  RR: 18 (03-09-25 @ 04:00) (18 - 18)  SpO2: 98% (03-09-25 @ 04:00) (98% - 98%)    Physical Examination:   General - NAD, obese female, left lower extremity in brace and cast  Cardiovascular -  no edema    Neurologic Exam:  Mental status - Eyes open, attending to examiner. Awake, Alert, oriented to person but not to place, time, or age. Speech fluent, repetition and naming intact. Follows simple commands.     Cranial nerves - PERRL, visual fields intact to threat b/l, EOMI, decreased sensation to cold temperature on left V1-V3, right facial droop noted while smiling, no overt facial asymmetry at rest, hearing intact b/l, palate with symmetric elevation, trapezius 5/5 strength b/l, tongue midline on protrusion with full lateral movement    Motor - Normal bulk throughout.   Strength testing  LUE: does not maintain antigravity, limb falls to bed immediately  RUE: maintains antigravity for at least 10 seconds, no drift  LLE: limited due to presence of brace/cast, but maintains antigravity  RLE: maintains antigravity for at least 5 seconds    Sensation - Decreased sensation to cold temperature in left arm and left leg    Coordination - Finger to Nose intact b/l. No tremors appreciated. CRYSTAL HTS due to cast in place.     Gait and station - Not tested due to cast in place.     Labs:          CAPILLARY BLOOD GLUCOSE      POCT Blood Glucose.: 85 mg/dL (09 Mar 2025 04:07)          CSF:                  Radiology:  CT Brain Stroke Protocol (03.09.25 @ 04:21) >  No acute intracranial hemorrhage, mass effect, or midline shift. If   clinical symptoms persist or worsen, more sensitive evaluation with brain   MRI may be obtained, if no contraindications exist.    CT Brain Perfusion Maps Stroke (03.09.25 @ 04:42) >  CT PERFUSION:  Technical limitations: Motion and suboptimal arterial andblood and   venous outflow functions.    Core infarction: 0 ml  Penumbra / tissue at risk for active ischemia: 641 ml throughout the   entirety of the intracranial contents, likely artifactual.    CTA NECK:  No evidence of significant stenosis or occlusion.    CTA HEAD:  No large vessel occlusion, significant stenosis or vascular abnormality   identified.

## 2025-03-09 NOTE — OCCUPATIONAL THERAPY INITIAL EVALUATION ADULT - NSOTDMEREC_GEN_A_CORE
TBD at rehab Rotation Flap Text: The defect edges were squared with a #15 scalpel blade.  Given the location of the defect, shape of the defect and the proximity to free margins a rotation flap was deemed most appropriate.  Using a sterile surgical marker, an appropriate rotation flap was drawn incorporating the defect and placing the expected incisions within the relaxed skin tension lines where possible.    The area thus outlined was incised deep to adipose tissue with a #15 scalpel blade.  The skin margins were undermined to an appropriate distance in all directions utilizing iris scissors.

## 2025-03-09 NOTE — H&P ADULT - NSHPLABSRESULTS_GEN_ALL_CORE
LABS:                         11.7   4.68  )-----------( 134      ( 09 Mar 2025 04:41 )             35.1     03-09    140  |  103  |  11  ----------------------------<  91  4.2   |  24  |  0.74    Ca    9.6      09 Mar 2025 04:41    TPro  7.7  /  Alb  4.4  /  TBili  0.3  /  DBili  x   /  AST  21  /  ALT  35[H]  /  AlkPhos  60  03-09    PT/INR - ( 09 Mar 2025 04:41 )   PT: 11.7 sec;   INR: 0.98 ratio         PTT - ( 09 Mar 2025 04:41 )  PTT:30.6 sec  Urinalysis Basic - ( 09 Mar 2025 04:41 )    Color: x / Appearance: x / SG: x / pH: x  Gluc: 91 mg/dL / Ketone: x  / Bili: x / Urobili: x   Blood: x / Protein: x / Nitrite: x   Leuk Esterase: x / RBC: x / WBC x   Sq Epi: x / Non Sq Epi: x / Bacteria: x                    RADIOLOGY, EKG & ADDITIONAL TESTS: Reviewed.

## 2025-03-09 NOTE — H&P ADULT - PROBLEM SELECTOR PLAN 7
chronic  c/w home quetiapine 200mg qhs and 25mg daily  c/w home divalproax 750mg BID for mood disorder  c/w home lexapro 10mg daily     of note, facility list does not risperidone /clonidine as active medication that was present on prior admissions. thus, will c/w medications as listed on the facility list

## 2025-03-09 NOTE — CONSULT NOTE ADULT - ASSESSMENT
39y female with a past medical history/ocular history of obesity, factor V Leiden deficiency a/w DVT/PE (on Eliquis 5mg BID), bipolar disorder, hypothyroidism, PNES, IBS, GERD, PID, endometriosis, asthma, personality disorder,  mild intellectual disability, CVA (2019) w. residual L sided weakness consulted for bilateral blurry vision.    #Blurry vision  - Sx started 1 day ago and were painless, the blurry vision is constant  - VA 20/200 PHNI OD, 20/100 PHNI OS, no APD, EOMs full, CVF pt unable to cooperate, color plates 8/12 OD and 4/12 OS, IOP wnl  - Anterior exam unremarkable  - Fundus exam notable for increased CDR, but no acute findings. Pt has been told she has increased risk for glaucoma  - Unclear etiology of pt's acutely decreased vision  - Recommend MRI brain and orbits w/w/o contrast     Outpatient Follow-up: Patient should follow-up with his/her ophthalmologist or with [facility] Department of Ophthalmology within 1 week of after discharge at:    [address]  [place] [address]  [phone]    Juan Salgado MD, PGY-3  Also available on [employer] Teams

## 2025-03-09 NOTE — ED PROVIDER NOTE - ATTENDING CONTRIBUTION TO CARE
40 yo F pmhx HTN, HLD, DM2, CVA with residual L side weakness, IBS, GERD, pseudoseizures, hypothyroidism, DVT and factor V deficiency on Eliquis, brought in as a code stroke with complaints of headache, b/l LE numbness. Pt noted to have a R facial droop, not previously documented (per chart review). LKW 6 pm. NIHSS 9. appreciate neuro recs, plan for admission.     VITALS: reviewed  GEN: NAD, A & O x 4  HEAD/EYES: NCAT, PERRL, EOMI, anicteric sclerae,   ENT: mucus membranes moist, oropharynx WNL, trachea midline,  RESP: lungs CTA with equal breath sounds bilaterally, chest wall nontender and atraumatic  CV: heart with reg rhythm S1, S2, no murmur; distal pulses intact and symmetric bilaterally  ABDOMEN: normoactive bowel sounds, soft, nondistended, nontender, no palpable masses  : no CVAT  MSK: extremities atraumatic and nontender, no edema, no asymmetry.   SKIN: warm, dry, no rash, no bruising, no cyanosis. color appropriate for ethnicity  NEURO: alert, mentating appropriately, R facial droop.  0/5 LUE, 5/5 RUE, LLE limited 2/2 brace but strength intact, RLE 5/5. decreased sensation to LUE and LLE.   PSYCH: Affect appropriate

## 2025-03-09 NOTE — OCCUPATIONAL THERAPY INITIAL EVALUATION ADULT - MANUAL MUSCLE TESTING RESULTS, REHAB EVAL
left UE hand able to "squeeze" digits. left UE shoulder to wrist 0/5, left UE digits 3-/5, right UE 3/5/grossly assessed due to

## 2025-03-09 NOTE — PHYSICAL THERAPY INITIAL EVALUATION ADULT - PASSIVE RANGE OF MOTION EXAMINATION, REHAB EVAL
left ankle not tested/bilateral upper extremity Passive ROM was WFL (within functional limits)/bilateral lower extremity Passive ROM was WFL (within functional limits)

## 2025-03-09 NOTE — CONSULT NOTE ADULT - ASSESSMENT
Assessment: 39RHF with a PMHx of obesity, factor V Leiden deficiency a/w DVT/PE (on Eliquis 5mg BID), bipolar disorder, hypothyroidism, PNES, IBS, GERD, PID, endometriosis, asthma, personality disorder, and mild intellectual disability who presented to [facility] ED from group home on 3/9/2025 as code stroke due to bilateral lower extremity numbness.    LKW: 18:00 on 3/8/2025  NIHSS: 9  preMRS: 4  Not tenecteplase candidate due to presentation outside of window.  Not thrombectomy candidate due to no LVO.     Impression:    Recommendations:   Assessment: 39RHF with a PMHx of obesity, factor V Leiden deficiency a/w DVT/PE (on Eliquis 5mg BID), bipolar disorder, hypothyroidism, PNES, IBS, GERD, PID, endometriosis, asthma, personality disorder, and mild intellectual disability who presented to [facility] ED from group home on 3/9/2025 as code stroke due to bilateral lower extremity numbness. CTH on 3/9/2025 nonacute, CTA H/N with no LVO.     LKW: 18:00 on 3/8/2025  NIHSS: 9  preMRS: 4  Not tenecteplase candidate due to presentation outside of window.  Not thrombectomy candidate due to no LVO.     Impression:     Recommendations:   Assessment: 39RHF with a PMHx of obesity, factor V Leiden deficiency a/w DVT/PE (on Eliquis 5mg BID), bipolar disorder, hypothyroidism, PNES, IBS, GERD, PID, endometriosis, asthma, personality disorder, and mild intellectual disability who presented to [facility] ED from group home on 3/9/2025 as code stroke due to bilateral lower extremity numbness. CTH on 3/9/2025 nonacute, CTA H/N with no LVO.     LKW: 18:00 on 3/8/2025  NIHSS: 9  preMRS: 4  Not tenecteplase candidate due to presentation outside of window.  Not thrombectomy candidate due to no LVO.     Impression: Left sensorimotor deficits and bilateral lower extremity sensory deficits. Etiology possibly due to acute ischemic stroke due to new findings on examination, although low suspicion vs functional.     Recommendations:  [] CDU for MRI brain w/o contrast  [] TTE with bubble study and telemetry to look for a cardiac source of embolism   [] HbA1C and Lipid Panel.  [] Continue home Eliquis   [] DVT prophylaxis - Lovenox 40MG SC daily  [] Telemonitoring; Neurochecks and vital signs Q4H  [] Permissive HTN up to 220/120 mmHg for first 24 hours after symptom onset followed by gradual normotension.   [] BG goal <180, avoid hypoglycemia  [] NPO until clears dysphagia screen, otherwise swallow evaluation  [] Fall, aspiration precautions   Assessment: 39RHF with a PMHx of obesity, factor V Leiden deficiency a/w DVT/PE (on Eliquis 5mg BID), bipolar disorder, hypothyroidism, PNES, IBS, GERD, PID, endometriosis, asthma, personality disorder, and mild intellectual disability who presented to [facility] ED from group home on 3/9/2025 as code stroke due to bilateral lower extremity numbness. CTH on 3/9/2025 nonacute, CTA H/N with no LVO, CTP with no core infarction, and penumbra likely artifactual.     LKW: 18:00 on 3/8/2025  NIHSS: 9  preMRS: 4  Not tenecteplase candidate due to presentation outside of window.  Not thrombectomy candidate due to no LVO.     Impression: Left sensorimotor deficits and bilateral lower extremity sensory deficits. Etiology possibly due to acute ischemic stroke due to new findings on examination, although low suspicion vs functional.     Recommendations:  [] MRI brain w/o contrast  [] TTE with bubble study and telemetry to look for a cardiac source of embolism   [] HbA1C and Lipid Panel.  [] Continue home Eliquis   [] DVT prophylaxis - Lovenox 40MG SC daily  [] Telemonitoring; Neurochecks and vital signs Q4H  [] Permissive HTN up to 220/120 mmHg for first 24 hours after symptom onset followed by gradual normotension.   [] BG goal <180, avoid hypoglycemia  [] NPO until clears dysphagia screen, otherwise swallow evaluation  [] Fall, aspiration precautions      Discussed with telestroke attending Dr. Gayle regarding decision against candidacy for tenecteplase/ thrombectomy.

## 2025-03-09 NOTE — H&P ADULT - NSHPSOCIALHISTORY_GEN_ALL_CORE
Resident of group [facility]   currently in [facility] for [facility]  Reports no active toxic habits

## 2025-03-09 NOTE — OCCUPATIONAL THERAPY INITIAL EVALUATION ADULT - RANGE OF MOTION EXAMINATION, UPPER EXTREMITY
Pt with no active ROM of left upper extremity except digits; Pt able to actively flex/extend digits into fist positioning. Full passive ROM shoulder to digits/Right UE Active ROM was WFL (within functional limits)

## 2025-03-09 NOTE — ED PROVIDER NOTE - CLINICAL SUMMARY MEDICAL DECISION MAKING FREE TEXT BOX
39-year-old female past medical history of IBS, GERD, pseudoseizures, hypothyroidism, DVT and factor V deficiency on Eliquis, hypothyroidism hypertension diabetes, CVA with residual left-sided weakness presents for evaluation as a code stroke for headache with bilateral lower extremity numbness, per EMS en route patient developed blurry vision left upper extremity weakness and dizziness.  Neuro at bedside upon code stroke call.  Last known well was prior to 6 PM.  Patient reports symptoms began at 6 PM.  Patient denies any fevers chills nausea vomiting chest pain shortness of breath.    On exam patient is awake and alert oriented to person but not place time or age.  Speech fluent, follows commands, pupils equal round reactive, extraocular movements intact, decreased sensation on the left face, right facial droop, tongue midline.  Left upper extremity 0 out of 5 strength, right upper extremity 5 out of 5 strength, left lower extremity 4 out of 5 strength, right lower extremity 5 out of 5 strength, new drift in the left upper extremity.  There is decree sensation in the left upper extremity left lower extremity.  Finger-to-nose intact.  Heart regular rate and rhythm lungs no respiratory distress abdomen soft nontender nondistended.    Concern for new stroke given history of hypercoagulability and prior strokes.  Other differentials to consider are acute intracranial hemorrhage.  Patient is out of the window for TNK but in the window for thrombectomy.  Plan for stroke workup and admission.

## 2025-03-09 NOTE — ED ADULT NURSE NOTE - OBJECTIVE STATEMENT
Pt arrives to ED A&Ox2, bed bound. Pt arrives from [facility]. Pt C/O bilateral leg numbness since 6pm yesterday and headache. As per EMS pt stated numbness traveled to left arm and began do endorse blurry vision in both eyes. PMHx Stroke 2019 with residual left arm and weakness pt on eliquis. Respirations are even and unlabored, capillary refill is 3 seconds bilaterally. US guided IV placed in left forearm, labs drawn and sent. report given to primary RN Loretta. Bed in lowest position, comfort measures provided, safety maintained.

## 2025-03-09 NOTE — PATIENT PROFILE ADULT - FUNCTIONAL ASSESSMENT - BASIC MOBILITY 6.
2-calculated by average/Not able to assess (calculate score using [facility] averaging method)

## 2025-03-09 NOTE — H&P ADULT - PROBLEM SELECTOR PLAN 9
- Diet: DASH  - DVT PPx: Eliquis   - Dispo: Pending clinical course    Plan of care discussed with patient who expressed understanding and agreement with it.

## 2025-03-09 NOTE — H&P ADULT - ASSESSMENT
Ms. Marte is a 39 year-old right-handed woman with a PMH of obesity, factor V Leiden deficiency a/w DVT/PE (on Eliquis 5mg BID), bipolar disorder, hypothyroidism, PNES, IBS, GERD, PID, endometriosis, asthma, personality disorder,  mild intellectual disability, CVA (2019) w. residual L sided weakness who presents from rehab at [facility] with b/l Leg numbness and headache, L arm numbness/weakness with L sided blurred vision with LKW on 3/8/25 around 1800 hours. Stroke code in the ED, workup negative thus far. Admitted for further workup and evaluation.

## 2025-03-10 LAB
GLUCOSE BLDC GLUCOMTR-MCNC: 107 MG/DL — HIGH (ref 70–99)
GLUCOSE BLDC GLUCOMTR-MCNC: 112 MG/DL — HIGH (ref 70–99)
GLUCOSE BLDC GLUCOMTR-MCNC: 113 MG/DL — HIGH (ref 70–99)
GLUCOSE BLDC GLUCOMTR-MCNC: 115 MG/DL — HIGH (ref 70–99)
GLUCOSE BLDC GLUCOMTR-MCNC: 121 MG/DL — HIGH (ref 70–99)

## 2025-03-10 PROCEDURE — 73590 X-RAY EXAM OF LOWER LEG: CPT | Mod: 26,LT

## 2025-03-10 PROCEDURE — 99232 SBSQ HOSP IP/OBS MODERATE 35: CPT

## 2025-03-10 PROCEDURE — 73630 X-RAY EXAM OF FOOT: CPT | Mod: 26,LT

## 2025-03-10 PROCEDURE — 70553 MRI BRAIN STEM W/O & W/DYE: CPT | Mod: 26

## 2025-03-10 PROCEDURE — 70543 MRI ORBT/FAC/NCK W/O &W/DYE: CPT | Mod: 26

## 2025-03-10 RX ORDER — OLANZAPINE 10 MG/1
2.5 TABLET ORAL ONCE
Refills: 0 | Status: COMPLETED | OUTPATIENT
Start: 2025-03-10 | End: 2025-03-10

## 2025-03-10 RX ORDER — DIPHENHYDRAMINE HCL 12.5MG/5ML
50 ELIXIR ORAL ONCE
Refills: 0 | Status: COMPLETED | OUTPATIENT
Start: 2025-03-10 | End: 2025-03-10

## 2025-03-10 RX ORDER — HYDROXYZINE HYDROCHLORIDE 25 MG/1
25 TABLET, FILM COATED ORAL ONCE
Refills: 0 | Status: COMPLETED | OUTPATIENT
Start: 2025-03-10 | End: 2025-03-10

## 2025-03-10 RX ORDER — DIPHENHYDRAMINE HCL 12.5MG/5ML
25 ELIXIR ORAL ONCE
Refills: 0 | Status: DISCONTINUED | OUTPATIENT
Start: 2025-03-10 | End: 2025-03-10

## 2025-03-10 RX ADMIN — Medication 50 MILLIGRAM(S): at 18:47

## 2025-03-10 RX ADMIN — CYCLOBENZAPRINE HYDROCHLORIDE 5 MILLIGRAM(S): 15 CAPSULE, EXTENDED RELEASE ORAL at 05:37

## 2025-03-10 RX ADMIN — OLANZAPINE 2.5 MILLIGRAM(S): 10 TABLET ORAL at 01:38

## 2025-03-10 RX ADMIN — GABAPENTIN 300 MILLIGRAM(S): 400 CAPSULE ORAL at 05:37

## 2025-03-10 RX ADMIN — QUETIAPINE FUMARATE 200 MILLIGRAM(S): 25 TABLET ORAL at 22:10

## 2025-03-10 RX ADMIN — Medication 750 MILLIGRAM(S): at 18:47

## 2025-03-10 RX ADMIN — CYCLOBENZAPRINE HYDROCHLORIDE 5 MILLIGRAM(S): 15 CAPSULE, EXTENDED RELEASE ORAL at 22:10

## 2025-03-10 RX ADMIN — APIXABAN 5 MILLIGRAM(S): 2.5 TABLET, FILM COATED ORAL at 05:37

## 2025-03-10 RX ADMIN — ESCITALOPRAM OXALATE 10 MILLIGRAM(S): 20 TABLET ORAL at 13:36

## 2025-03-10 RX ADMIN — Medication 100 MICROGRAM(S): at 05:37

## 2025-03-10 RX ADMIN — ATORVASTATIN CALCIUM 40 MILLIGRAM(S): 80 TABLET, FILM COATED ORAL at 21:31

## 2025-03-10 RX ADMIN — QUETIAPINE FUMARATE 25 MILLIGRAM(S): 25 TABLET ORAL at 13:36

## 2025-03-10 RX ADMIN — Medication 750 MILLIGRAM(S): at 05:36

## 2025-03-10 RX ADMIN — GABAPENTIN 300 MILLIGRAM(S): 400 CAPSULE ORAL at 21:31

## 2025-03-10 RX ADMIN — GABAPENTIN 300 MILLIGRAM(S): 400 CAPSULE ORAL at 13:21

## 2025-03-10 RX ADMIN — HYDROXYZINE HYDROCHLORIDE 25 MILLIGRAM(S): 25 TABLET, FILM COATED ORAL at 02:45

## 2025-03-10 RX ADMIN — Medication 1 GRAM(S): at 05:37

## 2025-03-10 RX ADMIN — APIXABAN 5 MILLIGRAM(S): 2.5 TABLET, FILM COATED ORAL at 18:48

## 2025-03-10 RX ADMIN — Medication 1 GRAM(S): at 18:47

## 2025-03-10 RX ADMIN — Medication 50 MILLIGRAM(S): at 22:10

## 2025-03-10 RX ADMIN — Medication 3 MILLIGRAM(S): at 21:31

## 2025-03-10 RX ADMIN — CYCLOBENZAPRINE HYDROCHLORIDE 5 MILLIGRAM(S): 15 CAPSULE, EXTENDED RELEASE ORAL at 13:21

## 2025-03-10 NOTE — PROGRESS NOTE ADULT - ASSESSMENT
39F R-handed, obesity, factor V Leiden deficiency a/w DVT/PE (on Eliquis 5mg BID), bipolar disorder, hypothyroidism, PNES, IBS, GERD, PID, endometriosis, asthma, personality disorder,  mild intellectual disability, CVA (2019) with residual L sided weakness who presents from rehab at [facility] with b/l Leg numbness and headache, L arm numbness/weakness with L sided blurred vision with LKW on 3/8/25 around 1800 hours. Stroke code in the ED, workup negative thus far. Admitted for further workup and evaluation.

## 2025-03-10 NOTE — PROGRESS NOTE ADULT - SUBJECTIVE AND OBJECTIVE BOX
Patient is a 39y old  Female who presents with a chief complaint of LE and arm numbness, further neurological evaluation (09 Mar 2025 15:51)    SUBJECTIVE / OVERNIGHT EVENTS:    no CP, SOB, f/c/n/v  watching iphone, using R arm  reports started with L sided numbness, now states it is also on her R side    MEDICATIONS  (STANDING):  apixaban 5 milliGRAM(s) Oral two times a day  atorvastatin 40 milliGRAM(s) Oral at bedtime  cyclobenzaprine 5 milliGRAM(s) Oral three times a day  diphenhydrAMINE 50 milliGRAM(s) Oral at bedtime  divalproex  milliGRAM(s) Oral two times a day  escitalopram 10 milliGRAM(s) Oral daily  gabapentin 300 milliGRAM(s) Oral every 8 hours  levothyroxine 100 MICROGram(s) Oral daily  melatonin 3 milliGRAM(s) Oral at bedtime  polyethylene glycol 3350 17 Gram(s) Oral daily  QUEtiapine 200 milliGRAM(s) Oral at bedtime  QUEtiapine 25 milliGRAM(s) Oral daily  sucralfate 1 Gram(s) Oral two times a day    MEDICATIONS  (PRN):  acetaminophen     Tablet .. 650 milliGRAM(s) Oral every 8 hours PRN Temp greater or equal to 38C (100.4F), Mild Pain (1 - 3)  albuterol    90 MICROgram(s) HFA Inhaler 2 Puff(s) Inhalation every 6 hours PRN for shortness of breath and/or wheezing  diphenhydrAMINE Injectable 50 milliGRAM(s) IV Push once PRN pre-MRI  oxycodone    5 mG/acetaminophen 325 mG 1 Tablet(s) Oral every 8 hours PRN Severe Pain (7 - 10)  senna 2 Tablet(s) Oral at bedtime PRN Constipation    T(C): 36.9 (03-10-25 @ 11:56), Max: 36.9 (03-10-25 @ 11:56)  HR: 98 (03-10-25 @ 11:56) (98 - 106)  BP: 121/66 (03-10-25 @ 11:56) (116/77 - 126/84)  RR: 14 (03-10-25 @ 11:56) (14 - 18)  SpO2: 97% (03-10-25 @ 11:56) (97% - 100%)    CAPILLARY BLOOD GLUCOSE  POCT Blood Glucose.: 107 mg/dL (10 Mar 2025 17:02)  POCT Blood Glucose.: 113 mg/dL (10 Mar 2025 13:03)  POCT Blood Glucose.: 115 mg/dL (10 Mar 2025 09:08)  POCT Blood Glucose.: 121 mg/dL (10 Mar 2025 00:42)    PHYSICAL EXAM:  GENERAL: NAD, obese   CHEST/LUNG: Clear to auscultation bilaterally; No wheeze  HEART: Regular rate and rhythm; No murmurs, rubs, or gallops  ABDOMEN: Soft, Nontender, Nondistended; Bowel sounds present  EXTREMITIES:  L sided ?weakness, arm drops but protect face when held above her; R hand swelling (chronic)  PSYCH: AAOx3  NEUROLOGY: non-focal    LABS:                        11.7   4.68  )-----------( 134      ( 09 Mar 2025 04:41 )             35.1     03-09    140  |  103  |  11  ----------------------------<  91  4.2   |  24  |  0.74    Ca    9.6      09 Mar 2025 04:41    TPro  7.7  /  Alb  4.4  /  TBili  0.3  /  DBili  x   /  AST  21  /  ALT  35[H]  /  AlkPhos  60  03-09    PT/INR - ( 09 Mar 2025 04:41 )   PT: 11.7 sec;   INR: 0.98 ratio    PTT - ( 09 Mar 2025 04:41 )  PTT:30.6 sec    Microbiology:   VBG 03-09 @ 04:41  pH: 7.33/pCO2: 53/pO2: 30/HCO3: 28/lactate: 1.6    Care Discussed with Consultants/Other Providers:   Patient is a 39y old  Female who presents with a chief complaint of LE and arm numbness, further neurological evaluation (09 Mar 2025 15:51)    SUBJECTIVE / OVERNIGHT EVENTS:    no CP, SOB, f/c/n/v  watching cell phone, using R arm  reports started with L sided numbness, now states it is also on her R side    MEDICATIONS  (STANDING):  apixaban 5 milliGRAM(s) Oral two times a day  atorvastatin 40 milliGRAM(s) Oral at bedtime  cyclobenzaprine 5 milliGRAM(s) Oral three times a day  diphenhydrAMINE 50 milliGRAM(s) Oral at bedtime  divalproex  milliGRAM(s) Oral two times a day  escitalopram 10 milliGRAM(s) Oral daily  gabapentin 300 milliGRAM(s) Oral every 8 hours  levothyroxine 100 MICROGram(s) Oral daily  melatonin 3 milliGRAM(s) Oral at bedtime  polyethylene glycol 3350 17 Gram(s) Oral daily  QUEtiapine 200 milliGRAM(s) Oral at bedtime  QUEtiapine 25 milliGRAM(s) Oral daily  sucralfate 1 Gram(s) Oral two times a day    MEDICATIONS  (PRN):  acetaminophen     Tablet .. 650 milliGRAM(s) Oral every 8 hours PRN Temp greater or equal to 38C (100.4F), Mild Pain (1 - 3)  albuterol    90 MICROgram(s) HFA Inhaler 2 Puff(s) Inhalation every 6 hours PRN for shortness of breath and/or wheezing  diphenhydrAMINE Injectable 50 milliGRAM(s) IV Push once PRN pre-MRI  oxycodone    5 mG/acetaminophen 325 mG 1 Tablet(s) Oral every 8 hours PRN Severe Pain (7 - 10)  senna 2 Tablet(s) Oral at bedtime PRN Constipation    T(C): 36.9 (03-10-25 @ 11:56), Max: 36.9 (03-10-25 @ 11:56)  HR: 98 (03-10-25 @ 11:56) (98 - 106)  BP: 121/66 (03-10-25 @ 11:56) (116/77 - 126/84)  RR: 14 (03-10-25 @ 11:56) (14 - 18)  SpO2: 97% (03-10-25 @ 11:56) (97% - 100%)    CAPILLARY BLOOD GLUCOSE  POCT Blood Glucose.: 107 mg/dL (10 Mar 2025 17:02)  POCT Blood Glucose.: 113 mg/dL (10 Mar 2025 13:03)  POCT Blood Glucose.: 115 mg/dL (10 Mar 2025 09:08)  POCT Blood Glucose.: 121 mg/dL (10 Mar 2025 00:42)    PHYSICAL EXAM:  GENERAL: NAD, obese   CHEST/LUNG: Clear to auscultation bilaterally; No wheeze  HEART: Regular rate and rhythm; No murmurs, rubs, or gallops  ABDOMEN: Soft, Nontender, Nondistended; Bowel sounds present  EXTREMITIES:  L sided ?weakness, arm drops but protect face when held above her; R hand swelling (chronic)  PSYCH: AAOx3  NEUROLOGY: non-focal    LABS:                        11.7   4.68  )-----------( 134      ( 09 Mar 2025 04:41 )             35.1     03-09    140  |  103  |  11  ----------------------------<  91  4.2   |  24  |  0.74    Ca    9.6      09 Mar 2025 04:41    TPro  7.7  /  Alb  4.4  /  TBili  0.3  /  DBili  x   /  AST  21  /  ALT  35[H]  /  AlkPhos  60  03-09    PT/INR - ( 09 Mar 2025 04:41 )   PT: 11.7 sec;   INR: 0.98 ratio    PTT - ( 09 Mar 2025 04:41 )  PTT:30.6 sec    Microbiology:   BRIANG 03-09 @ 04:41  pH: 7.33/pCO2: 53/pO2: 30/HCO3: 28/lactate: 1.6    Care Discussed with Consultants/Other Providers:

## 2025-03-10 NOTE — PROGRESS NOTE ADULT - PROBLEM SELECTOR PLAN 2
Blurry vision, symptoms started 1 day ago and were painless, the blurry vision is constant  - VA 20/200 PHNI OD, 20/100 PHNI OS, no APD, EOMs full, CVF pt unable to cooperate, color plates 8/12 OD and 4/12 OS, IOP wnl  - Anterior exam unremarkable  - Fundus exam notable for increased CDR, but no acute findings. Pt has been told she has increased risk for glaucoma  - MRI brain and orbits w/w/o contrast   - ophthalmology consult appreciated         - ophthalmology consulted for further input; recs and guidance appreciated Blurry vision, symptoms started 1 day ago and were painless, the blurry vision is constant  - MRI brain and orbits w/w/o contrast   - ophthalmology consult appreciated

## 2025-03-10 NOTE — PROGRESS NOTE ADULT - PROBLEM SELECTOR PLAN 4
chronic with hx of dvt/pe in the past  - c/w eliquis 5mg BIID. d/w neuro and in agreement with c/w AC for the patient based on current imaging    #HLD - c/w home lipitor 40mg daily (no crestor listed on facility med list)  #Insomnia - c/w benadryl 50mg qhs (pt's scheduled medication and takes it baseline regularly)   #constipation - senna / Miralax for bowel regimen chronic with hx of dvt/pe in the past  - c/w eliquis 5mg BID    #HLD - c/w home lipitor 40mg daily (no crestor listed on facility med list)  #Insomnia - c/w benadryl 50mg qhs (pt's scheduled medication and takes it baseline regularly)   #constipation - senna / Miralax for bowel regimen

## 2025-03-11 ENCOUNTER — TRANSCRIPTION ENCOUNTER (OUTPATIENT)
Age: 40
End: 2025-03-11

## 2025-03-11 ENCOUNTER — RESULT REVIEW (OUTPATIENT)
Age: 40
End: 2025-03-11

## 2025-03-11 VITALS
TEMPERATURE: 98 F | SYSTOLIC BLOOD PRESSURE: 117 MMHG | DIASTOLIC BLOOD PRESSURE: 83 MMHG | HEART RATE: 94 BPM | RESPIRATION RATE: 18 BRPM | OXYGEN SATURATION: 100 %

## 2025-03-11 LAB
ALBUMIN SERPL ELPH-MCNC: 3.7 G/DL — SIGNIFICANT CHANGE UP (ref 3.3–5)
ALP SERPL-CCNC: 56 U/L — SIGNIFICANT CHANGE UP (ref 40–120)
ALT FLD-CCNC: 28 U/L — SIGNIFICANT CHANGE UP (ref 4–33)
ANION GAP SERPL CALC-SCNC: 14 MMOL/L — SIGNIFICANT CHANGE UP (ref 7–14)
AST SERPL-CCNC: 33 U/L — HIGH (ref 4–32)
BILIRUB SERPL-MCNC: <0.2 MG/DL — SIGNIFICANT CHANGE UP (ref 0.2–1.2)
BUN SERPL-MCNC: 10 MG/DL — SIGNIFICANT CHANGE UP (ref 7–23)
CALCIUM SERPL-MCNC: 9.3 MG/DL — SIGNIFICANT CHANGE UP (ref 8.4–10.5)
CHLORIDE SERPL-SCNC: 105 MMOL/L — SIGNIFICANT CHANGE UP (ref 98–107)
CO2 SERPL-SCNC: 20 MMOL/L — LOW (ref 22–31)
CREAT SERPL-MCNC: 0.65 MG/DL — SIGNIFICANT CHANGE UP (ref 0.5–1.3)
EGFR: 116 ML/MIN/1.73M2 — SIGNIFICANT CHANGE UP
EGFR: 116 ML/MIN/1.73M2 — SIGNIFICANT CHANGE UP
GLUCOSE BLDC GLUCOMTR-MCNC: 100 MG/DL — HIGH (ref 70–99)
GLUCOSE BLDC GLUCOMTR-MCNC: 124 MG/DL — HIGH (ref 70–99)
GLUCOSE SERPL-MCNC: 95 MG/DL — SIGNIFICANT CHANGE UP (ref 70–99)
POTASSIUM SERPL-MCNC: 5.3 MMOL/L — SIGNIFICANT CHANGE UP (ref 3.5–5.3)
POTASSIUM SERPL-SCNC: 5.3 MMOL/L — SIGNIFICANT CHANGE UP (ref 3.5–5.3)
PROT SERPL-MCNC: 6.9 G/DL — SIGNIFICANT CHANGE UP (ref 6–8.3)
SODIUM SERPL-SCNC: 139 MMOL/L — SIGNIFICANT CHANGE UP (ref 135–145)

## 2025-03-11 PROCEDURE — 99232 SBSQ HOSP IP/OBS MODERATE 35: CPT

## 2025-03-11 PROCEDURE — 93306 TTE W/DOPPLER COMPLETE: CPT | Mod: 26

## 2025-03-11 PROCEDURE — 99239 HOSP IP/OBS DSCHRG MGMT >30: CPT

## 2025-03-11 RX ORDER — APIXABAN 2.5 MG/1
1 TABLET, FILM COATED ORAL
Qty: 0 | Refills: 0 | DISCHARGE
Start: 2025-03-11

## 2025-03-11 RX ORDER — OXYCODONE HYDROCHLORIDE AND ACETAMINOPHEN 10; 325 MG/1; MG/1
1 TABLET ORAL
Qty: 0 | Refills: 0 | DISCHARGE
Start: 2025-03-11

## 2025-03-11 RX ORDER — POLYETHYLENE GLYCOL 3350 17 G/17G
17 POWDER, FOR SOLUTION ORAL
Qty: 0 | Refills: 0 | DISCHARGE
Start: 2025-03-11

## 2025-03-11 RX ORDER — CYCLOBENZAPRINE HYDROCHLORIDE 15 MG/1
1 CAPSULE, EXTENDED RELEASE ORAL
Qty: 0 | Refills: 0 | DISCHARGE
Start: 2025-03-11

## 2025-03-11 RX ORDER — MELATONIN 5 MG
6 TABLET ORAL ONCE
Refills: 0 | Status: COMPLETED | OUTPATIENT
Start: 2025-03-11 | End: 2025-03-11

## 2025-03-11 RX ORDER — SUCRALFATE 1 G
1 TABLET ORAL
Qty: 0 | Refills: 0 | DISCHARGE
Start: 2025-03-11

## 2025-03-11 RX ORDER — AMMONIUM LACTATE 12 %
1 LOTION (ML) TOPICAL
Qty: 0 | Refills: 0 | DISCHARGE
Start: 2025-03-11

## 2025-03-11 RX ORDER — MELATONIN 5 MG
1 TABLET ORAL
Qty: 0 | Refills: 0 | DISCHARGE
Start: 2025-03-11

## 2025-03-11 RX ORDER — GABAPENTIN 400 MG/1
1 CAPSULE ORAL
Qty: 0 | Refills: 0 | DISCHARGE
Start: 2025-03-11

## 2025-03-11 RX ORDER — SENNA 187 MG
2 TABLET ORAL
Qty: 0 | Refills: 0 | DISCHARGE
Start: 2025-03-11

## 2025-03-11 RX ORDER — DIPHENHYDRAMINE HCL 12.5MG/5ML
1 ELIXIR ORAL
Qty: 0 | Refills: 0 | DISCHARGE
Start: 2025-03-11

## 2025-03-11 RX ORDER — AMMONIUM LACTATE 12 %
1 LOTION (ML) TOPICAL EVERY 12 HOURS
Refills: 0 | Status: DISCONTINUED | OUTPATIENT
Start: 2025-03-11 | End: 2025-03-11

## 2025-03-11 RX ORDER — LEVOTHYROXINE SODIUM 300 MCG
1 TABLET ORAL
Qty: 0 | Refills: 0 | DISCHARGE
Start: 2025-03-11

## 2025-03-11 RX ORDER — ACETAMINOPHEN 500 MG/5ML
2 LIQUID (ML) ORAL
Qty: 0 | Refills: 0 | DISCHARGE
Start: 2025-03-11

## 2025-03-11 RX ORDER — DIPHENHYDRAMINE HCL 12.5MG/5ML
50 ELIXIR ORAL EVERY 8 HOURS
Refills: 0 | Status: DISCONTINUED | OUTPATIENT
Start: 2025-03-11 | End: 2025-03-11

## 2025-03-11 RX ORDER — OXYCODONE HYDROCHLORIDE AND ACETAMINOPHEN 10; 325 MG/1; MG/1
1 TABLET ORAL
Refills: 0 | DISCHARGE

## 2025-03-11 RX ORDER — ALBUTEROL SULFATE 2.5 MG/3ML
2 VIAL, NEBULIZER (ML) INHALATION
Qty: 0 | Refills: 0 | DISCHARGE
Start: 2025-03-11

## 2025-03-11 RX ORDER — QUETIAPINE FUMARATE 25 MG/1
1 TABLET ORAL
Qty: 0 | Refills: 0 | DISCHARGE
Start: 2025-03-11

## 2025-03-11 RX ADMIN — GABAPENTIN 300 MILLIGRAM(S): 400 CAPSULE ORAL at 06:19

## 2025-03-11 RX ADMIN — Medication 6 MILLIGRAM(S): at 01:07

## 2025-03-11 RX ADMIN — CYCLOBENZAPRINE HYDROCHLORIDE 5 MILLIGRAM(S): 15 CAPSULE, EXTENDED RELEASE ORAL at 13:08

## 2025-03-11 RX ADMIN — CYCLOBENZAPRINE HYDROCHLORIDE 5 MILLIGRAM(S): 15 CAPSULE, EXTENDED RELEASE ORAL at 06:19

## 2025-03-11 RX ADMIN — Medication 100 MICROGRAM(S): at 05:16

## 2025-03-11 RX ADMIN — APIXABAN 5 MILLIGRAM(S): 2.5 TABLET, FILM COATED ORAL at 06:19

## 2025-03-11 RX ADMIN — ESCITALOPRAM OXALATE 10 MILLIGRAM(S): 20 TABLET ORAL at 11:29

## 2025-03-11 RX ADMIN — Medication 1 GRAM(S): at 06:19

## 2025-03-11 RX ADMIN — GABAPENTIN 300 MILLIGRAM(S): 400 CAPSULE ORAL at 13:08

## 2025-03-11 RX ADMIN — Medication 750 MILLIGRAM(S): at 06:19

## 2025-03-11 RX ADMIN — QUETIAPINE FUMARATE 25 MILLIGRAM(S): 25 TABLET ORAL at 11:30

## 2025-03-11 RX ADMIN — Medication 50 MILLIGRAM(S): at 10:56

## 2025-03-11 NOTE — PROGRESS NOTE ADULT - ASSESSMENT
Assessment: 39RHF with a PMHx of obesity, factor V Leiden deficiency a/w DVT/PE (on Eliquis 5mg BID), bipolar disorder, hypothyroidism, PNES, IBS, GERD, PID, endometriosis, asthma, personality disorder, and mild intellectual disability who presented to [facility] ED from group home on 3/9/2025 as code stroke due to bilateral lower extremity numbness. CTH on 3/9/2025 nonacute, CTA H/N with no LVO, CTP with no core infarction, and penumbra likely artifactual.     LKW: 18:00 on 3/8/2025  NIHSS: 9  preMRS: 4  Not tenecteplase candidate due to presentation outside of window.  Not thrombectomy candidate due to no LVO.     Impression: Left sensorimotor deficits and bilateral lower extremity sensory deficits, with inconsistent physical exam findings. Given her MRIs are unremarkable, the symptoms could be stress related and functional in origin. Further work up to r/o neuropathy can be done outpatient     Recommendations:  - No further inpatient neurological workup needed now  - Continue home Eliquis   - If numbness persists, then NCS/EMG of lower extremities can be done outpatient at [address] [place] [address], contact details given below   - Fall precautions  -DVT, GI, PT/OT as per primary team    Patient can follow up with general neurology at [address] [place] [address] 1-2 weeks after discharge. Please instruct the patient to call [phone] to schedule this appointment.    Patient seen and discussed with neurology attending Dr. David.  Note finalized upon attestation by the attending.  Assessment:  39RHF with a PMHx of obesity, factor V Leiden deficiency a/w DVT/PE (on Eliquis 5mg BID), bipolar disorder, hypothyroidism, PNES, IBS, GERD, PID, endometriosis, asthma, personality disorder, and mild intellectual disability who presented to [facility] ED from group home on 3/9/2025 as code stroke due to bilateral lower extremity numbness. CTH on 3/9/2025 nonacute, CTA H/N with no LVO, CTP with no core infarction, and penumbra likely artifactual.     LKW: 18:00 on 3/8/2025  NIHSS: 9  preMRS: 4  Not tenecteplase candidate due to presentation outside of window.  Not thrombectomy candidate due to no LVO.     Impression: Left sensorimotor deficits and bilateral lower extremity sensory deficits, with inconsistent physical exam findings. Given her MRIs are unremarkable, the symptoms could be stress related and functional in origin. Further work up to r/o neuropathy can be done outpatient     Recommendations:  - No further inpatient neurological workup needed now  - Continue home Eliquis   - If numbness persists, then NCS/EMG of lower extremities can be done outpatient at [address] [place] [address], contact details given below   - Fall precautions  -DVT, GI, PT/OT as per primary team    Patient can follow up with general neurology at [address] [place] [address] 1-2 weeks after discharge. Please instruct the patient to call [phone] to schedule this appointment.    Patient seen and discussed with neurology attending Dr. David.  Note finalized upon attestation by the attending.

## 2025-03-11 NOTE — PROGRESS NOTE ADULT - PROBLEM SELECTOR PLAN 2
Blurry vision, symptoms started 1 day ago and were painless, the blurry vision is constant  - MRI brain and orbits w/w/o contrast --> wnl  - ophthalmology consult appreciated, OP f/u  - reports at baseline wears corrective lenses

## 2025-03-11 NOTE — PROGRESS NOTE ADULT - REASON FOR ADMISSION
LE and arm numbness, further neurological evaluation
LE and arm numbness, further neurological evaluation

## 2025-03-11 NOTE — PROVIDER CONTACT NOTE (OTHER) - ASSESSMENT
Patient is A&Ox3. No signs of acute distress. Denies chest pain and shortness of breath.
Patient is c/o body itching. 

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
## 2025-03-11 NOTE — DISCHARGE NOTE NURSING/CASE MANAGEMENT/SOCIAL WORK - PATIENT PORTAL LINK FT
You can access the FollowMyHealth Patient Portal offered by [facility] by registering at the following website: [email or web address]. By joining [email or web address]’s FollowMyHealth portal, you will also be able to view your health information using other applications (apps) compatible with our system.

## 2025-03-11 NOTE — DISCHARGE NOTE PROVIDER - NSDCMRMEDTOKEN_GEN_ALL_CORE_FT
albuterol 90 mcg/inh inhalation aerosol: 2 puff(s) inhaled every 6 hours As needed for shortness of breath and/or wheezing  apixaban 5 mg oral tablet: 1 tab(s) orally 2 times a day  atorvastatin 40 mg oral tablet: 1 tab(s) orally once a day (at bedtime)  cyclobenzaprine 5 mg oral tablet: 1 tab(s) orally 3 times a day  diphenhydrAMINE 50 mg oral capsule: 1 cap(s) orally once a day (at bedtime)  divalproex sodium 250 mg oral delayed release tablet: 3 tab(s) orally 2 times a day  escitalopram 10 mg oral tablet: 1 tab(s) orally once a day  gabapentin 100 mg oral capsule: 3 cap(s) orally every 8 hours  levothyroxine 100 mcg (0.1 mg) oral tablet: 1 tab(s) orally once a day  melatonin 3 mg oral tablet: 1 tab(s) orally once a day (at bedtime)  Percocet 5 mg-325 mg oral tablet: 1 tab(s) orally 3 times a day  polyethylene glycol 3350 oral powder for reconstitution: 17 gram(s) orally once a day  QUEtiapine 200 mg oral tablet: 1 tab(s) orally once a day (at bedtime)  QUEtiapine 25 mg oral tablet: 1 tab(s) orally once a day  senna leaf extract oral tablet: 2 tab(s) orally once a day (at bedtime) As needed Constipation  sucralfate 1 g oral tablet: 1 tab(s) orally 2 times a day   acetaminophen 325 mg oral tablet: 2 tab(s) orally every 8 hours As needed Temp greater or equal to 38C (100.4F), Mild Pain (1 - 3)  albuterol 90 mcg/inh inhalation aerosol: 2 puff(s) inhaled every 6 hours As needed for shortness of breath and/or wheezing  ammonium lactate 12% topical lotion: 1 Apply topically to affected area every 12 hours As needed itch  apixaban 5 mg oral tablet: 1 tab(s) orally 2 times a day  atorvastatin 40 mg oral tablet: 1 tab(s) orally once a day (at bedtime)  cyclobenzaprine 5 mg oral tablet: 1 tab(s) orally 3 times a day  diphenhydrAMINE 50 mg oral capsule: 1 cap(s) orally once a day (at bedtime)  divalproex sodium 250 mg oral delayed release tablet: 3 tab(s) orally 2 times a day  escitalopram 10 mg oral tablet: 1 tab(s) orally once a day  gabapentin 300 mg oral capsule: 1 cap(s) orally every 8 hours  levothyroxine 100 mcg (0.1 mg) oral tablet: 1 tab(s) orally once a day  melatonin 3 mg oral tablet: 1 tab(s) orally once a day (at bedtime)  oxycodone-acetaminophen 5 mg-325 mg oral tablet: 1 tab(s) orally every 8 hours As needed Severe Pain (7 - 10)  polyethylene glycol 3350 oral powder for reconstitution: 17 gram(s) orally once a day  QUEtiapine 200 mg oral tablet: 1 tab(s) orally once a day (at bedtime)  QUEtiapine 25 mg oral tablet: 1 tab(s) orally once a day  senna leaf extract oral tablet: 2 tab(s) orally once a day (at bedtime) As needed Constipation  sucralfate 1 g oral tablet: 1 tab(s) orally 2 times a day

## 2025-03-11 NOTE — PROVIDER CONTACT NOTE (OTHER) - REASON
Patient is c/o body itching. 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
Unable to draw AM labs
Heart rate 106

## 2025-03-11 NOTE — DISCHARGE NOTE NURSING/CASE MANAGEMENT/SOCIAL WORK - FINANCIAL ASSISTANCE
[facility] provides services at a reduced cost to those who are determined to be eligible through [facility]’s financial assistance program. Information regarding [facility]’s financial assistance program can be found by going to [email or web address] or by calling [phone].

## 2025-03-11 NOTE — PROGRESS NOTE ADULT - PROBLEM SELECTOR PLAN 1
History of similar complaints  - Stroke code in the ED, seen by neuro   - CT brain stroke protocol without acute intracranial hemorrhage, mass effect, or midline shift. CT perfusion with technical limitations: motion and suboptimal arterial and blood and venous outflow functions, CTA head and neck with LVO or stenosis/vascular abnormality.   - MRI negative   - TTE read pending   - c/w Eliquis   - OP neurology f/u
History of similar complaints  - Stroke code in the ED and CT brain stroke protocol without acute intracranial hemorrhage, mass effect, or midline shift. CT perfusion with technical limitations: motion and suboptimal arterial and blood and venous outflow functions, CTA head and neck with LVO or stenosis/vascular abnormality.   - Neuro recs appreciated and ddx: acute ischemic stroke due to new findings on exam (lower suspicion) vs functional   - MRI pending   - TTE pending   - c/w tele, neuro checks  - c/w Eliquis   - Fall, aspiration precautions  - stat CTH if changes in neuro status

## 2025-03-11 NOTE — DISCHARGE NOTE NURSING/CASE MANAGEMENT/SOCIAL WORK - NSDCFUADDAPPT_GEN_ALL_CORE_FT
follow up with general neurology at [address] [place] [address] 1-2 weeks after discharge. Please instruct the patient to call [phone] to schedule this appointment.

## 2025-03-11 NOTE — DISCHARGE NOTE PROVIDER - NSDCFUADDAPPT_GEN_ALL_CORE_FT
follow up with general neurology at [address] [place] [address] 1-2 weeks after discharge. Please instruct the patient to call [phone] to schedule this appointment.

## 2025-03-11 NOTE — DISCHARGE NOTE PROVIDER - NSDCCPTREATMENT_GEN_ALL_CORE_FT
PRINCIPAL PROCEDURE  Procedure: MRI brain  Findings and Treatment: MRI BRAIN:  No hydrocephalus, midline shift, or effacement of basal cisterns.  No mass effect, vasogenic edema, or acute intracranial hemorrhage.  No restricted diffusion or evidence for acute territorial infarct.  Scattered nonspecific nonenhancing, foci of T2 and FLAIR hyperintense   signal predominantly in the subcortical cerebral white matter.  No abnormal parenchymal, leptomeningeal, or dural enhancement.  Flow voids are seen within the major intracranial vessels consistent with   their patency.  Small polyps/retention cysts in the maxillary sinuses. Minimal ethmoid   sinus mucosal thickening. Remaining visualized paranasal sinuses and   mastoid air cells are clear.  Similar-appearing 0.7 x 1.6 x 0.8 cm (maximal AP x TV x CC dimensions) T1   hyperintense lesion within the mid and posterior pituitary gland, grossly   similar to MRI brain 3/2/2022.  IMPRESSION:  MRI BRAIN:  No hydrocephalus, mass effect, acute intracranial hemorrhage, vasogenic   edema, restricted diffusion, or acute territorial infarct.  No abnormal parenchymal, leptomeningeal, or dural enhancement.  Similar-appearing 0.7 x 1.6 x 0.8 cm (maximal AP x TV x CC dimensions) T1   hyperintense lesion within the mid and posterior pituitary gland, grossly   similar to MRI brain 3/2/2022. 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        SECONDARY PROCEDURE  Procedure: MRI orbit w contrast  Findings and Treatment: MRI ORBITS:  The globes, extraocular muscles, retrobulbar fat, and optic nerves are   unremarkable.  No abnormal signal or enhancement within the optic nerves or optic chiasm.  No abnormal enhancement within the orbits.  MRI ORBITS:  Intraorbital contents unremarkable.  No abnormal signal or enhancement within the optic nerves or optic chiasm.  No abnormal enhancement within the orbits.       PRINCIPAL PROCEDURE  Procedure: MRI brain  Findings and Treatment: MRI BRAIN:  No hydrocephalus, midline shift, or effacement of basal cisterns.  No mass effect, vasogenic edema, or acute intracranial hemorrhage.  No restricted diffusion or evidence for acute territorial infarct.  Scattered nonspecific nonenhancing, foci of T2 and FLAIR hyperintense   signal predominantly in the subcortical cerebral white matter.  No abnormal parenchymal, leptomeningeal, or dural enhancement.  Flow voids are seen within the major intracranial vessels consistent with   their patency.  Small polyps/retention cysts in the maxillary sinuses. Minimal ethmoid   sinus mucosal thickening. Remaining visualized paranasal sinuses and   mastoid air cells are clear.  Similar-appearing 0.7 x 1.6 x 0.8 cm (maximal AP x TV x CC dimensions) T1   hyperintense lesion within the mid and posterior pituitary gland, grossly   similar to MRI brain 3/2/2022.  IMPRESSION:  MRI BRAIN:  No hydrocephalus, mass effect, acute intracranial hemorrhage, vasogenic   edema, restricted diffusion, or acute territorial infarct.  No abnormal parenchymal, leptomeningeal, or dural enhancement.  Similar-appearing 0.7 x 1.6 x 0.8 cm (maximal AP x TV x CC dimensions) T1   hyperintense lesion within the mid and posterior pituitary gland, grossly   similar to MRI brain 3/2/2022. 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        SECONDARY PROCEDURE  Procedure: MRI orbit w contrast  Findings and Treatment: MRI ORBITS:  The globes, extraocular muscles, retrobulbar fat, and optic nerves are   unremarkable.  No abnormal signal or enhancement within the optic nerves or optic chiasm.  No abnormal enhancement within the orbits.  MRI ORBITS:  Intraorbital contents unremarkable.  No abnormal signal or enhancement within the optic nerves or optic chiasm.  No abnormal enhancement within the orbits.      Procedure: Complete transthoracic echocardiography (TTE)  Findings and Treatment: CONCLUSIONS:   1. Left ventricular endocardium is not well visualized; however, the left ventricular systolic function appears grossly normal.   2. Normal left ventricular diastolic function, with normal left ventricular filling pressure.   3. Normal right ventricular cavity size, with normal wall thickness, and normal right ventricular systolic function.   4. Left atrium is normal in size.   5. No significant valvular disease.   6. No pericardial effusion seen.   7. Agitated saline injection was negative for intracardiac shunt.   8. Pulmonary artery systolic pressure could not be estimated.

## 2025-03-11 NOTE — DISCHARGE NOTE PROVIDER - CARE PROVIDER_API CALL
Rohan Braxton  Internal Medicine  [address] [place] [address]  Phone: [phone]  Fax: [phone]  Follow Up Time:

## 2025-03-11 NOTE — PROGRESS NOTE ADULT - ASSESSMENT
39y female with a past medical history/ocular history of obesity, factor V Leiden deficiency a/w DVT/PE (on Eliquis 5mg BID), bipolar disorder, hypothyroidism, PNES, IBS, GERD, PID, endometriosis, asthma, personality disorder,  mild intellectual disability, CVA (2019) w. residual L sided weakness consulted for bilateral blurry vision.    #Blurry vision  - Sx started 1 day ago and were painless, the blurry vision is constant  - VA 20/200 PHNI OD, 20/100 PHNI OS, no APD, EOMs full, CVF pt unable to cooperate, color plates 8/12 OD and 4/12 OS, IOP wnl  - 3/11/25 - today vision OD improved significantly to 20/70 OD, unchanged OS, color plates also improved to 8/12 OU  - Anterior exam unremarkable  - Fundus exam notable for increased CDR, but no acute findings. Pt has been told she has increased risk for glaucoma  - Unclear etiology of pt's acutely decreased vision  - MRI with no acute findings    Outpatient Follow-up: Patient should follow-up with his/her ophthalmologist or with [facility] Department of Ophthalmology within 1 week of after discharge at:    [facility]  [place] [address]  [phone]    Juan Salgado MD, PGY-3  Also available on [employer] Teams

## 2025-03-11 NOTE — PROVIDER CONTACT NOTE (OTHER) - BACKGROUND
Patient admitted for facial weakness
Patient admitted for facial weakness
38 y/o PMH of obesity, factor V Leiden deficiency a/w DVT/PE, hypothyroidism, asthma, personality disorder,  mild intellectual disability, CVA, p/w b/l Leg numbness and headache, blurred vision.

## 2025-03-11 NOTE — PROGRESS NOTE ADULT - PROBLEM SELECTOR PLAN 4
chronic with hx of dvt/pe in the past  - c/w Eliquis 5mg BID    #HLD - c/w home lipitor 40mg daily (no crestor listed on facility med list)  #Insomnia - c/w benadryl 50mg qhs (pt's scheduled medication and takes it baseline regularly)   #constipation - senna / Miralax for bowel regimen

## 2025-03-11 NOTE — PROGRESS NOTE ADULT - SUBJECTIVE AND OBJECTIVE BOX
Patient is a 39y old  Female who presents with a chief complaint of LE and arm numbness, further neurological evaluation (09 Mar 2025 15:51)    SUBJECTIVE / OVERNIGHT EVENTS:    no CP, SOB, f/c/n/v  eating 2 oatmeals with 9 brown sugars and syrup, advised not healthy, too much sugar, states likes it sweet  reports [facility] gave her the foot boot, went there 2 wks ago from rehab, mentions fractures and infections, no issues noted on skin  aware of MRI findings and plan to return to [facility]  asking for IV benadryl ,advised not an otpion was only for the MRI    MEDICATIONS  (STANDING):  apixaban 5 milliGRAM(s) Oral two times a day  atorvastatin 40 milliGRAM(s) Oral at bedtime  cyclobenzaprine 5 milliGRAM(s) Oral three times a day  diphenhydrAMINE 50 milliGRAM(s) Oral at bedtime  divalproex  milliGRAM(s) Oral two times a day  escitalopram 10 milliGRAM(s) Oral daily  gabapentin 300 milliGRAM(s) Oral every 8 hours  levothyroxine 100 MICROGram(s) Oral daily  melatonin 3 milliGRAM(s) Oral at bedtime  polyethylene glycol 3350 17 Gram(s) Oral daily  QUEtiapine 200 milliGRAM(s) Oral at bedtime  QUEtiapine 25 milliGRAM(s) Oral daily  sucralfate 1 Gram(s) Oral two times a day    MEDICATIONS  (PRN):  acetaminophen     Tablet .. 650 milliGRAM(s) Oral every 8 hours PRN Temp greater or equal to 38C (100.4F), Mild Pain (1 - 3)  albuterol    90 MICROgram(s) HFA Inhaler 2 Puff(s) Inhalation every 6 hours PRN for shortness of breath and/or wheezing  ammonium lactate 12% Lotion 1 Application(s) Topical every 12 hours PRN itch  oxycodone    5 mG/acetaminophen 325 mG 1 Tablet(s) Oral every 8 hours PRN Severe Pain (7 - 10)  senna 2 Tablet(s) Oral at bedtime PRN Constipation    T(C): 36.6 (03-11-25 @ 05:00), Max: 36.9 (03-10-25 @ 11:56)  HR: 82 (03-11-25 @ 05:00) (82 - 98)  BP: 112/64 (03-11-25 @ 05:00) (102/71 - 121/66)  RR: 18 (03-11-25 @ 05:00) (14 - 18)  SpO2: 98% (03-11-25 @ 05:00) (97% - 100%)    CAPILLARY BLOOD GLUCOSE  POCT Blood Glucose.: 124 mg/dL (11 Mar 2025 01:19)  POCT Blood Glucose.: 112 mg/dL (10 Mar 2025 18:18)  POCT Blood Glucose.: 107 mg/dL (10 Mar 2025 17:02)  POCT Blood Glucose.: 113 mg/dL (10 Mar 2025 13:03)    PHYSICAL EXAM:  GENERAL: NAD, obese   CHEST/LUNG: Clear to auscultation bilaterally; No wheeze  HEART: Regular rate and rhythm; No murmurs, rubs, or gallops  ABDOMEN: Soft, Nontender, Nondistended; Bowel sounds present  EXTREMITIES:  large but no pitting  PSYCH: AAOx3  NEUROLOGY: L sided ?weakness, no effort, on arrival holding oatmeal cup in left arm while eating, when boot removed held L leg up during remove antigravity, when asked to move L side later states she cannot    LABS:    03-11    139  |  105  |  10  ----------------------------<  95  5.3   |  20[L]  |  0.65    Ca    9.3      11 Mar 2025 07:00    TPro  6.9  /  Alb  3.7  /  TBili  <0.2  /  DBili  x   /  AST  33[H]  /  ALT  28  /  AlkPhos  56  03-11    Urinalysis Basic - ( 11 Mar 2025 07:00 )  Color: x / Appearance: x / SG: x / pH: x  Gluc: 95 mg/dL / Ketone: x  / Bili: x / Urobili: x   Blood: x / Protein: x / Nitrite: x   Leuk Esterase: x / RBC: x / WBC x   Sq Epi: x / Non Sq Epi: x / Bacteria: x    Care Discussed with Consultants/Other Providers:

## 2025-03-11 NOTE — PROGRESS NOTE ADULT - SUBJECTIVE AND OBJECTIVE BOX
*************************************  NEUROLOGY PROGRESS NOTE  **************************************    YODIT MORROW  Female  [id number]    Subjective: Patient seen and examined at bedside with Neurology team and attending     Interval History: MRI brain/orbits were done and showed no acute pathology. The patient is clinically stable. She reported some emotional distress as she was being bullied by her room mate in the group home and also her foster mother told her that she (the patient) will die in a week.           VITAL SIGNS:  Vital Signs Last 24 Hrs  T(C): 36.6 (11 Mar 2025 05:00), Max: 36.9 (10 Mar 2025 11:56)  T(F): 97.8 (11 Mar 2025 05:00), Max: 98.5 (10 Mar 2025 11:56)  HR: 82 (11 Mar 2025 05:00) (82 - 98)  BP: 112/64 (11 Mar 2025 05:00) (102/71 - 121/66)  BP(mean): --  RR: 18 (11 Mar 2025 05:00) (14 - 18)  SpO2: 98% (11 Mar 2025 05:00) (97% - 100%)    Parameters below as of 11 Mar 2025 05:00  Patient On (Oxygen Delivery Method): room air                PHYSICAL EXAMINATION:    General:  Constitutional: Female, appears stated age, nontoxic, not in distress,    Head: Normocephalic;   Eyes: clear sclera;   Extremities: No cyanosis;   Resp: breathing comfortably     Neurological:  MS: Awake, alert.  Oriented person place situation. Follows commands. Attends to examiner  Language: Speech intact, clear, fluent, good repetition,  comprehension, registration of words.    CNs: PERRL (R 3mm, L 3mm). was showing some difficulty in seeing objects held on her peripheral visual field. no diplopia, No disconjugate gaze, skew. V1-3 intact LT, No facial asymmetry b/l. Hearing grossly normal b/l. Tongue midline.     Motor - Normal bulk and tone throughout. No pronator drift.  Strength testing           Grossly 4+/5, strength testing showed some giveaway weakness.     Sensation -Mildly impaired light touch sensation in the both lower limbs, no definite level     DTR's -             Grossly intact throughout    Coordination - Finger to Nose intact b/l. No tremors appreciated    Gait and station - Not tested.   Romberg not tested      LABS:    CBC   Chem 03-11    139  |  105  |  10  ----------------------------<  95  5.3   |  20[L]  |  0.65    Ca    9.3      11 Mar 2025 07:00    TPro  6.9  /  Alb  3.7  /  TBili  <0.2  /  DBili  x   /  AST  33[H]  /  ALT  28  /  AlkPhos  56  03-11    LFTs LIVER FUNCTIONS - ( 11 Mar 2025 07:00 )  Alb: 3.7 g/dL / Pro: 6.9 g/dL / ALK PHOS: 56 U/L / ALT: 28 U/L / AST: 33 U/L / GGT: x           Coagulopathy   Lipid Panel   A1c   Cardiac enzymes     U/A Urinalysis Basic - ( 11 Mar 2025 07:00 )    Color: x / Appearance: x / SG: x / pH: x  Gluc: 95 mg/dL / Ketone: x  / Bili: x / Urobili: x   Blood: x / Protein: x / Nitrite: x   Leuk Esterase: x / RBC: x / WBC x   Sq Epi: x / Non Sq Epi: x / Bacteria: x      CSF  Immunological  Other      RADIOLOGY & ADDITIONAL STUDIES:      MRI BRAIN:  No hydrocephalus, mass effect, acute intracranial hemorrhage, vasogenic edema, restricted diffusion, or acute territorial infarct.  No abnormal parenchymal, leptomeningeal, or dural enhancement.    Similar-appearing 0.7 x 1.6 x 0.8 cm (maximal AP x TV x CC dimensions) T1 hyperintense lesion within the mid and posterior pituitary gland, grossly similar to MRI brain 3/2/2022. 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    MRI ORBITS:  Intraorbital contents unremarkable.  No abnormal signal or enhancement within the optic nerves or optic chiasm.  No abnormal enhancement within the orbits.    CT Brain Stroke Protocol (03.09.25 @ 04:21) >  No acute intracranial hemorrhage, mass effect, or midline shift. If   clinical symptoms persist or worsen, more sensitive evaluation with brain   MRI may be obtained, if no contraindications exist.    CT PERFUSION:  Technical limitations: Motion and suboptimal arterial and blood and   venous outflow functions.    Core infarction: 0 ml  Penumbra / tissue at risk for active ischemia: 641 ml throughout the   entirety of the intracranial contents, likely artifactual.    CTA NECK:  No evidence of significant stenosis or occlusion.    CTA HEAD:  No large vessel occlusion, significant stenosis or vascular abnormality   identified.    MRI BRAIN:  No hydrocephalus, mass effect, acute intracranial hemorrhage, vasogenic edema, restricted diffusion, or acute territorial infarct.  No abnormal parenchymal, leptomeningeal, or dural enhancement.    Similar-appearing 0.7 x 1.6 x 0.8 cm (maximal AP x TV x CC dimensions) T1 hyperintense lesion within the mid and posterior pituitary gland, grossly similar to MRI brain 3/2/2022. 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    MRI ORBITS:  Intraorbital contents unremarkable.  No abnormal signal or enhancement within the optic nerves or optic chiasm.  No abnormal enhancement within the orbits.   *************************************  NEUROLOGY PROGRESS NOTE  **************************************    YODIT MORROW  Female  [id number]    Subjective: Patient seen and examined at bedside with Neurology team and attending     Interval History:  MRI brain/orbits were done and showed no acute pathology.  The patient is clinically stable.  She reported some emotional distress as she was being bullied by her room mate in the group home.  Patient counseled on functional neurologic symptoms and described its relation to psychological stressors.       VITAL SIGNS:  Vital Signs Last 24 Hrs  T(C): 36.6 (11 Mar 2025 05:00), Max: 36.9 (10 Mar 2025 11:56)  T(F): 97.8 (11 Mar 2025 05:00), Max: 98.5 (10 Mar 2025 11:56)  HR: 82 (11 Mar 2025 05:00) (82 - 98)  BP: 112/64 (11 Mar 2025 05:00) (102/71 - 121/66)  BP(mean): --  RR: 18 (11 Mar 2025 05:00) (14 - 18)  SpO2: 98% (11 Mar 2025 05:00) (97% - 100%)    Parameters below as of 11 Mar 2025 05:00  Patient On (Oxygen Delivery Method): room air        PHYSICAL EXAMINATION:    General:  Constitutional: Female, appears stated age, nontoxic, not in distress,    Head: Normocephalic;   Eyes: clear sclera;   Extremities: No cyanosis;   Resp: breathing comfortably     Neurological:  MS: Awake, alert.  Oriented person place situation. Follows commands. Attends to examiner  Language: Speech intact, clear, fluent, good repetition,  comprehension, registration of words.    CNs: PERRL (R 3mm, L 3mm). was showing some difficulty in seeing objects held on her peripheral visual field. no diplopia, No disconjugate gaze, skew. V1-3 intact LT, No facial asymmetry b/l. Hearing grossly normal b/l. Tongue midline.     Motor - Normal bulk and tone throughout.  Downward drift on Left arm but without pronator drift and without orbiting  Strength testing           Grossly 4+/5, strength testing showed some giveaway weakness.     Sensation -Mildly impaired light touch sensation in the both lower limbs, no definite level     DTR's -             Grossly intact throughout    Coordination - Finger to Nose intact b/l. No tremors appreciated    Gait and station - Not tested.   Romberg not tested      LABS:    CBC   Chem 03-11    139  |  105  |  10  ----------------------------<  95  5.3   |  20[L]  |  0.65    Ca    9.3      11 Mar 2025 07:00    TPro  6.9  /  Alb  3.7  /  TBili  <0.2  /  DBili  x   /  AST  33[H]  /  ALT  28  /  AlkPhos  56  03-11    LFTs LIVER FUNCTIONS - ( 11 Mar 2025 07:00 )  Alb: 3.7 g/dL / Pro: 6.9 g/dL / ALK PHOS: 56 U/L / ALT: 28 U/L / AST: 33 U/L / GGT: x             RADIOLOGY & ADDITIONAL STUDIES:      MRI BRAIN:  No hydrocephalus, mass effect, acute intracranial hemorrhage, vasogenic edema, restricted diffusion, or acute territorial infarct.  No abnormal parenchymal, leptomeningeal, or dural enhancement.    Similar-appearing 0.7 x 1.6 x 0.8 cm (maximal AP x TV x CC dimensions) T1 hyperintense lesion within the mid and posterior pituitary gland, grossly similar to MRI brain 3/2/2022. 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    MRI ORBITS:  Intraorbital contents unremarkable.  No abnormal signal or enhancement within the optic nerves or optic chiasm.  No abnormal enhancement within the orbits.    CT Brain Stroke Protocol (03.09.25 @ 04:21) >  No acute intracranial hemorrhage, mass effect, or midline shift. If   clinical symptoms persist or worsen, more sensitive evaluation with brain   MRI may be obtained, if no contraindications exist.    CT PERFUSION:  Technical limitations: Motion and suboptimal arterial and blood and   venous outflow functions.    Core infarction: 0 ml  Penumbra / tissue at risk for active ischemia: 641 ml throughout the   entirety of the intracranial contents, likely artifactual.    CTA NECK:  No evidence of significant stenosis or occlusion.    CTA HEAD:  No large vessel occlusion, significant stenosis or vascular abnormality   identified.    MRI BRAIN:  No hydrocephalus, mass effect, acute intracranial hemorrhage, vasogenic edema, restricted diffusion, or acute territorial infarct.  No abnormal parenchymal, leptomeningeal, or dural enhancement.    Similar-appearing 0.7 x 1.6 x 0.8 cm (maximal AP x TV x CC dimensions) T1 hyperintense lesion within the mid and posterior pituitary gland, grossly similar to MRI brain 3/2/2022. 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    MRI ORBITS:  Intraorbital contents unremarkable.  No abnormal signal or enhancement within the optic nerves or optic chiasm.  No abnormal enhancement within the orbits.

## 2025-03-11 NOTE — PROGRESS NOTE ADULT - PROBLEM SELECTOR PLAN 5
Hx of CVA in past w. residual L sided weakness; Reports at baseline when she is better, pt self reports she is more wheelchair dependent; has had R hand swelling chronically for years, chronic lymphedema in LE  - PT / OT eval noted in ED and rec for [facility]  - fall precautions
Hx of CVA in past w. residual L sided weakness; Reports at baseline when she is better, pt self reports she is more wheelchair dependent; has had R hand swelling chronically for years, chronic lymphedema in [place]  - PT / OT eval noted in ED and rec for [facility]  - fall precautions

## 2025-03-11 NOTE — PROGRESS NOTE ADULT - PROBLEM SELECTOR PLAN 6
Reports fx happened while at [facility], states went to [facility]  - x-ray of L foot negative  - in hard boot for unclear reasons, can f/u with prescribing MD  - boot removed and no lesion on skin or foot
Reports fx happened while at [facility]  - in hard boot  - f/u x-ray (done, read pending)

## 2025-03-11 NOTE — PROGRESS NOTE ADULT - ASSESSMENT
39F R-handed, obesity, factor V Leiden deficiency a/w DVT/PE (on Eliquis 5mg BID), bipolar disorder, hypothyroidism, PNES, IBS, GERD, PID, endometriosis, asthma, personality disorder,  mild intellectual disability, CVA (2019) with residual L sided weakness who presents from rehab at [facility] with bilateral leg numbness and headache, L arm numbness/weakness with L sided blurred vision with LKW on 3/8/25 around 1800 hours. Stroke code in the ED, MRI was negative.  Pending return to [facility].

## 2025-03-11 NOTE — DISCHARGE NOTE NURSING/CASE MANAGEMENT/SOCIAL WORK - NSDCPEELIQUISREACT_GEN_ALL_CORE
Apixaban/Eliquis increases your risk for bleeding. Notify your doctor if you experience any of the following side effects: bleeding, coughing or vomiting blood, red or black stool, unexpected pain or swelling, itching or hives, chest pain, chest tightness, trouble breathing, changes in how much or how often you urinate, red or pink urine, numbness or tingling in your feet, or unusual muscle weakness. When Apixaban/Eliquis is taken with other medicines, they can affect how it works. Taking other medications such as aspirin, blood thinners, nonsteroidal anti-inflammatories, and medications that treat depression can increase your risk of bleeding. It is very important to tell your health care provider about all of the other medicines, including over-the-counter medications, herbs, and vitamins you are taking. DO NOT start, stop, or change the dosage of any medicine, including over-the-counter medicines, vitamins, and herbal products without your doctor’s approval. Any products containing aspirin or are nonsteroidal anti-inflammatories lessen the blood’s ability to form clots and add to the effect of Apixaban/Eliquis. Never take aspirin or medicines that contain aspirin without speaking to your doctor.
intact

## 2025-03-11 NOTE — CHART NOTE - NSCHARTNOTEFT_GEN_A_CORE
Reference #: [id number]      PDI	My Rx	Current Rx	Drug Type	Rx Written	Rx Dispensed	Drug	Quantity	Days Supply	Prescriber Name	Prescriber SHERRI #  A	N	Y	O	03/03/2025	03/03/2025	oxycodone-acetaminophen 5-325 mg tablet	60	20	Torsten Jones	[id number]  Payment Method Cash  [facility]  B	Y	N	O	01/15/2025	01/15/2025	oxycodone-acetaminophen 5-325 mg tab	6	2	Shun Sanchez	[id number]  Payment Method Insurance  [facility]  B	N	N	B	01/06/2025	01/06/2025	clonazepam 1 mg tablet	60	30	Carmen Weiner MD	[id number]  Payment Method Insurance  [facility]  B	N	N	O	12/12/2024	12/13/2024	oxycodone-acetaminophen 5-325 mg tab	4	1	Lazarus, Max (DO)	[id number]  Payment Method Insurance  [facility]  B	N	N	B	12/09/2024	12/12/2024	clonazepam 1 mg tablet	30	30	Carmen Weiner MD	[id number]  Payment Method Insurance  [facility]  B	N	N	B	12/04/2024	12/06/2024	alprazolam 0.25 mg tablet	30	30	Saúl Bowens, NANCY Mph,PA-C	[id number]  Payment Method Insurance  [facility]  B	N	N	O	08/11/2024	08/12/2024	oxycodone-acetaminophen 5-325 mg tab	4	1	Kevin Samaniego)	[id number]  Payment Method Insurance  [facility]  B	N	N	O	08/06/2024	08/06/2024	oxycodone hcl (ir) 5 mg tablet	5	2	Breanna Brooks	[id number]  Payment Method Insurance  [facility]
MRI brain w/ and w/o IV contrast results are as below:     MR Head w/wo IV Cont (03.10.25 @ 20:14) >    MRI BRAIN:  No hydrocephalus, mass effect, acute intracranial hemorrhage, vasogenic   edema, restricted diffusion, or acute territorial infarct.  No abnormal parenchymal, leptomeningeal, or dural enhancement.    Similar-appearing 0.7 x 1.6 x 0.8 cm (maximal AP x TV x CC dimensions) T1   hyperintense lesion within the mid and posterior pituitary gland, grossly   similar to MRI brain 3/2/2022. 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        Further recommendations:  - Please refer to attending attestation from initial consult note dated 3/9/2025 for recommendations.  - Continue home Eliquis  - BP goals 130/70  - Patient can follow up with general neurology at [address] [place] [address] 1-2 weeks after discharge. Please instruct the patient to call [phone] to schedule this appointment.  - No further workup required at this time from neurovascular standpoint        Thank you for allowing us to participate in the care of this patient. Please call [phone] with any questions.

## 2025-03-11 NOTE — PROVIDER CONTACT NOTE (OTHER) - SITUATION
Patient is c/o body itching. 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
Unable to draw patients AM labs. Patient is a hard stick
Patient heart rate 106

## 2025-03-11 NOTE — DISCHARGE NOTE PROVIDER - HOSPITAL COURSE
39F R-handed, obesity, factor V Leiden deficiency a/w DVT/PE (on Eliquis 5mg BID), bipolar disorder, hypothyroidism, PNES, IBS, GERD, PID, endometriosis, asthma, personality disorder,  mild intellectual disability, CVA (2019) with residual L sided weakness who presents from rehab at [facility] with bilateral leg numbness and headache, L arm numbness/weakness with L sided blurred vision with LKW on 3/8/25 around 1800 hours. Stroke code in the ED, MRI was negative.  Pending return to [facility].     Hospital Course:    MRI brain and orbits was negative.  Was seen by neuro and ophthalmology, no further IP work-up can f/u OP.   PT rec [facility].   LLE x-rays were negative.  Continued on other home medications.     PT rec [facility] 39F R-handed, obesity, factor V Leiden deficiency a/w DVT/PE (on Eliquis 5mg BID), bipolar disorder, hypothyroidism, PNES, IBS, GERD, PID, endometriosis, asthma, personality disorder,  mild intellectual disability, CVA (2019) with residual L sided weakness who presents from rehab at [facility] with bilateral leg numbness and headache, L arm numbness/weakness with L sided blurred vision with LKW on 3/8/25 around 1800 hours. Stroke code in the ED, MRI brain and orbits was negative.  Was seen by neuro and ophthalmology, no further IP work-up can f/u OP.   Continues to report L sided weakness but using L side to hold things etc.  PT rec [facility].   LLE x-rays were negative.  Continued on other home medications.     PT rec [facility] 39F R-handed, obesity, factor V Leiden deficiency a/w DVT/PE (on Eliquis 5mg BID), bipolar disorder, hypothyroidism, PNES, IBS, GERD, PID, endometriosis, asthma, personality disorder,  mild intellectual disability, CVA (2019) with residual L sided weakness who presents from rehab at [facility] with bilateral leg numbness and headache, L arm numbness/weakness with L sided blurred vision with LKW on 3/8/25 around 1800 hours. Stroke code in the ED, MRI brain and orbits was negative.  Was seen by neuro and ophthalmology, no further IP work-up can f/u OP.   Continues to report L sided weakness but using L side to hold things etc, likely functional deficits.  PT rec [facility].   LLE x-rays were negative.  Continued on other home medications.     PT rec [facility]

## 2025-03-11 NOTE — PROGRESS NOTE ADULT - SUBJECTIVE AND OBJECTIVE BOX
[facility] DEPARTMENT OF OPHTHALMOLOGY - INITIAL ADULT CONSULT  ----------------------------------------------------------------------------------------------------  Primitivo Fernandes MD PGY-2  Available on teams  ----------------------------------------------------------------------------------------------------    HPI:  Ms. 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 Pt reports that she was in her usual state of health when she noted that later in the evening began to have LLE numbness, followed by L face and L arm numbness. Pt reports additionally, she was experiencing more heaviness in R leg and not be able to be as antigravity as earlier, dizziness, blurriness in vision, and some non specific generalized headache. Reports at baseline when she is better, pt self reports she is more wheelchair dependent; has had R hand swelling chronically for years, chronic lymphedema in LE as well and previous hx of b/l LE weakness and numbness as well but reports this is worse than her baseline where she can move her R and leg and arm well and antigravity. No able to move L leg at all and L arm is more antigravity / able to life more.     Pt seen in ED on multiple occassions, in no distress, comfortable. Did have some itchiness around previous blood draw/iv draw site, requesting benadryl (she does it regularly at baseline nightly along with prn) which was given and later reported some improvement. She was able to pass bedside dysphagia screen and be able to eat food well, no issues with it. still reporting blurred vision, no active cp/sob/abd pain/n/v/d/c, and reports her sx from yesterday and still overnight not fully better. Reports burning on urination, no urinary frequency. +BM. Of note, prior to assessment, did appreciate pt being able to more mobile/antigravity with the L arm.     Pt has been admitted for L sided weakness in Jan , found to have hypoglycemic event, no recurrence since on that admission and discharged on Jan 15th, Since then, has had recurrent presentations to the ED for cc of UTI, increased anxiety, again hospitalization from Feb 1st-6th for constipation and hip pain and pain med adjusted and was discharged to [facility]. Since the last hospitalization, presented to the ED Feb 15-16 for LLQ pain with CT imaging neg for PE, no acute abd path, fall with CT imaging of head and unimpressive for acute intracranial bleeding/acute fx, knee/elbow xrays neg for acute fx/dislocation as well. Reported to have L leg fx, cast in place, has been on percocet standing TID along with flexiril standing TID for pain relief.     ED course: afebrile, VSS with BP in 110s-120s/70. Was a stroke code in the ED. CT brain stroke protocol without acute intracranial hemorrhage, mass effect, or midline shift. CT perfusion with technical limitations: motion and suboptimal arterial and blood and venous outflow functions, CTA head and neck with LVO or stenosis/vascular abnormality. Labs notable for: VBG 7.33/53/30/28, lactate 1.6, CBC notable for WBC 4.68, Hb 11.7, Plt 134K, CMP wnl, hepatic panel with mild ALT elevation at 35, bHCG neg, coags wnl, BG 85. Seen by PT/OT in the ED and rec [facility], back to rehab facility.     Admitted for further workup and evaluation.    (09 Mar 2025 14:39)    Interval History: Vision improving    PAST MEDICAL & SURGICAL HISTORY:  Asthma      Seizure      Factor V Leiden      Bipolar disorder      Endometriosis      History of DVT in adulthood  RLE on eliquis      Hypothyroid      Seasonal allergies      Insomnia      GERD (gastroesophageal reflux disease)      HTN (hypertension)      Diabetes      S/P appendectomy        Past Ocular History: glaucoma suspect  Ophthalmic Medications: none  FAMILY HISTORY:  Family history of DVT (Mother)      MEDICATIONS  (STANDING):  apixaban 5 milliGRAM(s) Oral two times a day  atorvastatin 40 milliGRAM(s) Oral at bedtime  cyclobenzaprine 5 milliGRAM(s) Oral three times a day  diphenhydrAMINE 50 milliGRAM(s) Oral at bedtime  divalproex  milliGRAM(s) Oral two times a day  escitalopram 10 milliGRAM(s) Oral daily  gabapentin 300 milliGRAM(s) Oral every 8 hours  levothyroxine 100 MICROGram(s) Oral daily  melatonin 3 milliGRAM(s) Oral at bedtime  polyethylene glycol 3350 17 Gram(s) Oral daily  QUEtiapine 200 milliGRAM(s) Oral at bedtime  QUEtiapine 25 milliGRAM(s) Oral daily  sucralfate 1 Gram(s) Oral two times a day    MEDICATIONS  (PRN):  acetaminophen     Tablet .. 650 milliGRAM(s) Oral every 8 hours PRN Temp greater or equal to 38C (100.4F), Mild Pain (1 - 3)  albuterol    90 MICROgram(s) HFA Inhaler 2 Puff(s) Inhalation every 6 hours PRN for shortness of breath and/or wheezing  oxycodone    5 mG/acetaminophen 325 mG 1 Tablet(s) Oral every 8 hours PRN Severe Pain (7 - 10)  senna 2 Tablet(s) Oral at bedtime PRN Constipation    Allergies & Intolerances:   morphine (Unknown)  penicillin (Hives)  ibuprofen (Pruritus)  latex (Blisters)  [This allergen will not trigger allergy alert] pineapple allergenic extract (Swelling)  Zofran (Hives)  trazodone (Other)  TraZODone Hydrochloride (Unknown)  [This allergen will not trigger allergy alert] Toradol SOLN (Unknown)  Toradol (Hives)  TraMADol Hydrochloride (Unknown)    Review of Systems:  Constitutional: No fever, chills  Eyes: blurry vision OU  Neuro: No tremors  Cardiovascular: No chest pain, palpitations  Respiratory: No SOB, no cough  GI: No nausea, vomiting, abdominal pain  : No dysuria  Skin: no rash  Psych: no depression  Endocrine: no polyuria, polydipsia  Heme/lymph: no swelling    VITALS: T(C): 36.4 (03-09-25 @ 15:21)  T(F): 97.5 (03-09-25 @ 15:21), Max: 98.2 (03-09-25 @ 10:13)  HR: 98 (03-09-25 @ 15:21) (88 - 98)  BP: 126/80 (03-09-25 @ 15:21) (113/86 - 126/80)  RR:  (18 - 20)  SpO2:  (98% - 100%)  Wt(kg): --  General: AAO x 3, appropriate mood and affect    Ophthalmology Exam:  Visual acuity (sc): 20/70 PH OD, 20/100 PHNI OS  Pupils: PERRL OU, no APD  Ttono: 12 OD, 13 OS  Extraocular movements (EOMs): Full OU, no pain, no diplopia  Confrontational Visual Field (CVF): Pt unable, possibly 2/2 lack of cooperation  Color Plates: 8/13 OD, 8/13 OS    Pen Light Exam (PLE)  External: Flat OU  Lids/Lashes/Lacrimal Ducts: Flat OU    Sclera/Conjunctiva: W+Q OU  Cornea: Cl OU  Anterior Chamber: D+F OU    Iris: Flat OU  Lens: Cl OU    Fundus Exam: dilated with 1% tropicamide and 2.5% phenylephrine  Approval obtained from primary team for dilation  Patient aware that pupils can remained dilated for at least 4-6 hours  Exam performed with 20D lens    Vitreous: wnl OU  Disc, cup/disc: sharp and pink, 0.7 OU  Macula: wnl OU  Vessels: wnl OU  Periphery: wnl OU    Labs/Imaging:  ***

## 2025-03-11 NOTE — PROGRESS NOTE ADULT - PROBLEM SELECTOR PLAN 7
- c/w quetiapine 200mg qhs and 25mg daily, divalproex 750mg BID, Lexapro 10mg daily   - of note, facility list does not risperidone /clonidine as active medication that was present on prior admissions. thus, will c/w medications as listed on the facility list
- c/w quetiapine 200mg qhs and 25mg daily, divalproex 750mg BID, Lexapro 10mg daily   - of note, facility list does not risperidone /clonidine as active medication that was present on prior admissions. thus, will c/w medications as listed on the facility list

## 2025-03-11 NOTE — DISCHARGE NOTE PROVIDER - NSDCCPCAREPLAN_GEN_ALL_CORE_FT
PRINCIPAL DISCHARGE DIAGNOSIS  Diagnosis: Weakness of distal arms and legs  Assessment and Plan of Treatment: You were seen by neurology.   MRI brain was negative.  Can follow-up with neurology as an outpatient. Outpatient EMG and NCV rule out neuropathy.      SECONDARY DISCHARGE DIAGNOSES  Diagnosis: Factor V Leiden  Assessment and Plan of Treatment: Continue with your blood thinners.    Diagnosis: Blurred vision, bilateral  Assessment and Plan of Treatment: You were seen by ophthalmology.  MRI orbits was negative.  Follow-up as an outpatient.

## 2025-03-11 NOTE — PROGRESS NOTE ADULT - PROBLEM SELECTOR PLAN 9
Eliquis  PT rec LOY  pending MRIs
Eliquis  PT rec LOY  return to [facility], dc time 37 min

## 2025-03-11 NOTE — PROVIDER CONTACT NOTE (OTHER) - ACTION/TREATMENT ORDERED:
Per ACP patient is maxed out of benadryl for the day, patient also just got 50mg PO Benadryl within the hour, placing an order for cream to apply on the skin for itching.
Provider notified and no further intervention instructed by provider.
As per provider no further intervention instructed.

## 2025-03-14 ENCOUNTER — EMERGENCY (EMERGENCY)
Facility: HOSPITAL | Age: 40
LOS: 1 days | Discharge: NURSING, REHAB OR LONG-TERM CARE | End: 2025-03-14
Attending: EMERGENCY MEDICINE | Admitting: EMERGENCY MEDICINE
Payer: MEDICARE

## 2025-03-14 VITALS
SYSTOLIC BLOOD PRESSURE: 120 MMHG | HEART RATE: 94 BPM | HEIGHT: 68 IN | DIASTOLIC BLOOD PRESSURE: 81 MMHG | WEIGHT: 293 LBS | TEMPERATURE: 98 F | RESPIRATION RATE: 18 BRPM | OXYGEN SATURATION: 96 %

## 2025-03-14 DIAGNOSIS — Z90.49 ACQUIRED ABSENCE OF OTHER SPECIFIED PARTS OF DIGESTIVE TRACT: Chronic | ICD-10-CM

## 2025-03-14 PROCEDURE — 99284 EMERGENCY DEPT VISIT MOD MDM: CPT

## 2025-03-14 RX ORDER — HYDROCORTISONE 10 MG/G
1 CREAM TOPICAL ONCE
Refills: 0 | Status: DISCONTINUED | OUTPATIENT
Start: 2025-03-14 | End: 2025-03-18

## 2025-03-14 RX ORDER — HYDROXYZINE HYDROCHLORIDE 25 MG/1
50 TABLET, FILM COATED ORAL ONCE
Refills: 0 | Status: COMPLETED | OUTPATIENT
Start: 2025-03-14 | End: 2025-03-14

## 2025-03-14 RX ORDER — GABAPENTIN 400 MG/1
300 CAPSULE ORAL ONCE
Refills: 0 | Status: COMPLETED | OUTPATIENT
Start: 2025-03-14 | End: 2025-03-14

## 2025-03-14 RX ADMIN — HYDROXYZINE HYDROCHLORIDE 50 MILLIGRAM(S): 25 TABLET, FILM COATED ORAL at 21:59

## 2025-03-14 RX ADMIN — GABAPENTIN 300 MILLIGRAM(S): 400 CAPSULE ORAL at 21:59

## 2025-03-14 RX ADMIN — Medication 40 MILLIGRAM(S): at 21:59

## 2025-03-14 NOTE — ED PROVIDER NOTE - PATIENT PORTAL LINK FT
You can access the FollowMyHealth Patient Portal offered by [facility] by registering at the following website: [email or web address]. By joining [facility]’s FollowMyHealth portal, you will also be able to view your health information using other applications (apps) compatible with our system.

## 2025-03-14 NOTE — ED PROVIDER NOTE - CLINICAL SUMMARY MEDICAL DECISION MAKING FREE TEXT BOX
39-year-old female coming from [facility] history of DM, HTN, GERD, hypothyroid, endometriosis, bipolar disorder, factor V Leiden, seizure, asthma presenting with rash.  States aide at nursing home new yesterday rubbed diaper cream all over her body and since then she has had pruritus and erythema.  States she is scratching and breaking skin.  Tried Benadryl last night without relief.  Exam as above.  Will treat with Pepcid and Vistaril.  Will give home pain med.  Likely discharge back to nursing home.

## 2025-03-14 NOTE — ED PROVIDER NOTE - WET READ LAUNCH FT
Called to discuss referral and recommendations with patient.    Procedure/Imaging/Clinic: EUS with possible biopsy  Physician: Chichi  Timin/19  Procedure length:  Anesthesia: general  Dx: duodenal mass  Tier: 2  Location: [facility]     Explained they can expect a call from  for date and time of procedure, will need a , someone to stay with them for 24 hours and should stay in town for 24 hours (within 45 min of Hospital) post procedure    Patient needs to get pre-op physical completed. If outside [facility] system will need physical faxed to number [phone]   If you do not get a preop physical, your procedure could be cancelled, patient voiced understanding*    Preop Plan: PCP in [facility], pt will arrange a pre op exam to be done prior to procedure tentatively date of     Med Review    Blood thinner - none  ASA - none  Diabetic - none    COVID test discussed: yes, pt will schedule on date of pre op exam at [facility], provided COVID result fax number    Patient Education r/t procedure: no mychart, will need mail and discussed over the phone    Verbalized understanding of all instructions. All questions answered.     Case request placed, message sent to OR     Muna Porter, RN, BSN,   [facility]  Care coordinator                There are no Wet Read(s) to document.

## 2025-03-14 NOTE — ED PROVIDER NOTE - PROGRESS NOTE DETAILS
Patient states she is feeling much better. Wants to go back to nursing home. SW called to get ride back. -Iwona Campbell DO (PGY-1) Patient complaining of itching again. Ordered hydrocortisone cream. Patient declined cream. Wants to go home. Will discharge. Ride scheduled for 0030. -Iwona Campbell DO (PGY-1)

## 2025-03-14 NOTE — ED PROVIDER NOTE - CPE EDP GASTRO NORM
[facility]  Discharge Summary      Patient Name: Megha Ladd  MRN: [id number]  [facility]: [id number]  Patient Class: IP- Inpatient  Admission Date: 3/12/2025  Hospital Length of Stay: 1 days  Discharge Date and Time:  03/14/2025 2:48 PM  Attending Physician: Oralia Cortes MD   Discharging Provider: Melissa Garcia NP  Primary Care Provider: Zhanna Gandara MD    Primary Care Team: Networked reference to record PCT     HPI:   Megha Ladd is a 81 y.o. y.o. female with a PMHx of asthma, GERD, HTN who presented to the ED with chest pain and palpitations onset x 4:00 a.m..  She was described chest pain as an aching and pulling sensation that started in the center of her chest and went to shoulders and neck.  Pain was constant and waxed and waned in intensity.  She also reported associated nausea, shortness of breath, palpitations and diaphoresis.  Pain was 8/10 at its worst and denies pain currently.  Patient was found to be in AFib RVR via EMS.  She received diltiazem and nitroglycerin in transit.  Chest pain was relieved with nitro.  Upon arrival was in normal sinus rhythm.  Denies chest pain currently.  States she was followed by cardiologist many years ago but is not seen by Cardiology currently.  Nonsmoker.  Denies EtOH use. ED workup was significant for hypokalemia and hypomagnesemia. Cardiology was consulted. Hospital Medicine was consulted for further workup and management of the patient.              * No surgery found *      Hospital Course:   Patient monitored closely during hospitalization.  Cardiology was consulted for chest pain and possible episode of atrial fibrillation.  Patient has remained in sinus rhythm since admission.  Her troponin was trended with peak at 388, then down trended.  She underwent a nuclear stress test which was negative for reversible ischemia.  An echocardiogram was performed which showed normal EF.  Patient was hypertensive, Cardiology has made  adjustments to her antihypertensive medications.  Her electrolytes were replaced as needed.  Her BP improved.  She was cleared for discharge by Cardiology.  Will order cardiac monitor outpatient.  She is to follow up with PCP and Cardiologist in 1 week.  Patient seen and examined on day of discharge and deemed stable for discharge home.        Goals of Care Treatment Preferences:  Code Status: Full Code         Consults:   Consults (From admission, onward)          Status Ordering Provider     Inpatient consult to Cardiology  Once        Provider:  Drake Matson MD Completed ORTEGO, ANNA C.            Assessment & Plan  Atrial fibrillation and flutter  - cardiology consulted: appreciate recommendations  - tele monitoring   - FD lovenox ordered  - spontaneously converted to NSR after diltiazem was given by EMS  - Continue metoprolol --- changed to carvedilol per cardiology   - Replete electrolytes to maintain goal K >4 and Mg >2        HTN (hypertension)  Patient's blood pressure range in the last 24 hours was: BP  Min: 144/83  Max: 162/68.The patient's inpatient anti-hypertensive regimen is listed below:  Current Antihypertensives  carvediloL tablet 25 mg, 2 times daily, Oral  hydroCHLOROthiazide tablet 25 mg, Daily, Oral  hydrochlorothiazide (HYDRODIURIL) tablet, Daily, Oral  carvedilol (COREG) tablet, 2 times daily, Oral    Plan  - BP is uncontrolled, will adjust as follows: Coreg dose increased     GERD (gastroesophageal reflux disease)  Pepcid     Chest pain  - cardiology consulted: appreciate recommendations  - tele monitoring   - troponin trend: 8.8 -> 195.8 -> 388 -> 355  - on FD Lovenox for AF continue per cards recs   - ECHO reviewed, normal EF  - Nuclear stress test 3/13/25 negative for reversible ischemia  - PRN NTG, EKG     Hypokalemia  Patient's most recent potassium results are listed below.   Recent Labs     03/12/25  1620 03/13/25  0436 03/14/25  1002   K 3.6 3.7 4.4     Plan  - Replete  potassium per protocol  - Monitor potassium Daily  - Patient's hypokalemia is stable  Hypomagnesemia  Patient has Abnormal Magnesium: hypomagnesemia. Will continue to monitor electrolytes closely. Will replace the affected electrolytes and repeat labs to be done after interventions completed. The patient's magnesium results have been reviewed and are listed below.  Recent Labs   Lab 03/14/25  1002   MG 1.7    Replete per PRN protocol   Final Active Diagnoses:    Diagnosis Date Noted POA    PRINCIPAL PROBLEM:  Atrial fibrillation and flutter [I48.91, I48.92] 03/12/2025 Yes    Hypokalemia [E87.6] 03/12/2025 Yes    Hypomagnesemia [E83.42] 03/12/2025 Yes    Chest pain [R07.9] 10/25/2023 Yes    GERD (gastroesophageal reflux disease) [K21.9] 05/22/2012 Yes    HTN (hypertension) [I10] 03/29/2012 Yes      Problems Resolved During this Admission:       Discharged Condition: stable    Disposition: Home or Self Care    Follow Up:   Contact information for follow-up providers       Drake Matson MD Follow up in 1 week(s).    Specialty: Cardiology  Why: clinic will contact you with appt date/time  Contact information:  [address]  [address]  [place] [address]  [phone]                       Contact information for after-discharge care       Home Medical Care       [facility] .    Service: Home Health Services  Contact information:  [address] [address]  [phone]                                 Patient Instructions:      Ambulatory referral/consult to Home Health   Standing Status: Future   Referral Priority: Routine Referral Type: Home Health   Referral Reason: Specialty Services Required   Requested Specialty: Home Health Services   Number of Visits Requested: 1     Diet Cardiac     Notify your health care provider if you experience any of the following:  temperature >100.4     Notify your health care provider if you experience any of the following:  persistent  nausea and vomiting or diarrhea     Notify your health care provider if you experience any of the following:  severe uncontrolled pain     Notify your health care provider if you experience any of the following:  difficulty breathing or increased cough     Notify your health care provider if you experience any of the following:  severe persistent headache     Notify your health care provider if you experience any of the following:  persistent dizziness, light-headedness, or visual disturbances     Notify your health care provider if you experience any of the following:  increased confusion or weakness     Cardiac Monitor - 3-15 Day Adult (Cupid Only)   Standing Status: Future Standing Exp. Date: 03/14/26     Order Specific Question Answer Comments   Duration: 14 days    Release to patient Immediate      Activity as tolerated       Significant Diagnostic Studies: Labs: CMP   Recent Labs   Lab 03/12/25  1620 03/13/25  0436 03/14/25  1002   NA  --  134* 133*   K 3.6 3.7 4.4   CL  --  95 96   CO2  --  31* 30*   GLU  --  95 93   BUN  --  16 11   CREATININE  --  1.0 0.8   CALCIUM  --  8.7 9.3   PROT  --  5.9*  --    ALBUMIN  --  3.4*  --    BILITOT  --  0.8  --    ALKPHOS  --  63  --    AST  --  12  --    ALT  --  5*  --    ANIONGAP  --  8 7*   , CBC   Recent Labs   Lab 03/13/25  0436 03/14/25  1002   WBC 10.72 8.35   HGB 10.9* 11.9*   HCT 32.9* 35.3*    253   , and All labs within the past 24 hours have been reviewed    Pending Diagnostic Studies:       Procedure Component Value Units Date/Time    Echo [[id number]]     Order Status: Sent Lab Status: No result            Medications:  Reconciled Home Medications:      Medication List        START taking these medications      carvediloL 25 MG tablet  Commonly known as: COREG  Take 1 tablet (25 mg total) by mouth 2 (two) times daily.            CHANGE how you take these medications      ADVAIR -21 mcg/actuation Hfaa inhaler  Generic drug: fluticasone  propion-salmeterol 115-21 mcg/dose  INHALE 2 PUFFS INTO THE LUNGS 2 TIMES DAILY. CONTROLLER  What changed: See the new instructions.     cloNIDine 0.1 MG tablet  Commonly known as: CATAPRES  Take 1 tablet if systolic above 180 or diastolic above 105. Can repeat after 2 hours if bp remains above 180 or 105.  What changed:   how much to take  how to take this  when to take this  reasons to take this     fluticasone propionate 50 mcg/actuation nasal spray  Commonly known as: FLONASE  SPRAY 1 SPRAY IN EACH NOSTRIL 2 TIMES A DAY  What changed: See the new instructions.     hydroCHLOROthiazide 25 MG tablet  Commonly known as: HYDRODIURIL  Take 1 tablet (25 mg total) by mouth once daily.  What changed:   medication strength  how much to take            CONTINUE taking these medications      acetaminophen 500 MG tablet  Commonly known as: TYLENOL  Take 500 mg by mouth 2 (two) times a day.     cetirizine 10 MG tablet  Commonly known as: ZYRTEC  Take 10 mg by mouth daily as needed for Allergies.     meclizine 12.5 mg tablet  Commonly known as: ANTIVERT  Take 1 tablet (12.5 mg total) by mouth 3 (three) times daily as needed for Dizziness.     metaxalone 800 MG tablet  Commonly known as: SKELAXIN  Take 800 mg by mouth every 6 (six) hours as needed (muscle pain).     montelukast 10 mg tablet  Commonly known as: SINGULAIR  Take 1 tablet (10 mg total) by mouth every evening.     pantoprazole 20 MG tablet  Commonly known as: PROTONIX  Take 1 tablet (20 mg total) by mouth once daily.     peg 400-propylene glycol 0.4-0.3 % Drop  Place 2 drops into both eyes 2 (two) times daily as needed (dry eyes).     pregabalin 75 MG capsule  Commonly known as: LYRICA  Take 75 mg by mouth 2 (two) times daily.     SIMPLY SALINE NASL  1 spray by Nasal route 2 (two) times daily as needed (allergies).     traMADoL 50 mg tablet  Commonly known as: ULTRAM  Take 50 mg by mouth every 12 (twelve) hours as needed for Pain.            STOP taking these  medications      metoprolol tartrate 50 MG tablet  Commonly known as: LOPRESSOR            ASK your doctor about these medications      ketoconazole 2 % shampoo  Commonly known as: NIZORAL  Wash hair with medicated shampoo at least 2x/week - let sit on scalp at least 5 minutes prior to rinsing              Indwelling Lines/Drains at time of discharge:   Lines/Drains/Airways       None                   Time spent on the discharge of patient: 35 minutes         Melissa Garcia NP  Department of Hospital Medicine  [facility]   normal...

## 2025-03-14 NOTE — ED PROVIDER NOTE - NS ED ROS FT
GENERAL: No fever or chills  EYES: No change in vision  HEENT: No trouble swallowing or speaking  CARDIAC: No chest pain  PULMONARY: No cough or SOB  GI: No abdominal pain, no nausea or no vomiting, no diarrhea or constipation  : No changes in urination  SKIN: +rash, pruritis  NEURO: No headache  MSK: No joint pain  Otherwise as HPI or negative.

## 2025-03-14 NOTE — PROVIDER CONTACT NOTE (OTHER) - ASSESSMENT
Pt is cleared to return back to [facility] at [address] [place] [address]. Arranged [facility] [phone]. Trip# [id number] P/U 12:30.

## 2025-03-14 NOTE — ED PROVIDER NOTE - PHYSICAL EXAMINATION
General: alert, oriented to person, time, place  Psych: mood appropriate  Head: normocephalic; atraumatic  Eyes: EOMI, conjunctivae clear bilaterally, sclerae anicteric  ENT: no nasal flaring, patent nares, normal oropharynx  Cardio: RRR, no m/r/g, pulses 2+ b/l  Resp: CATB, no w/r/r  GI: soft/nondistended/nontender  Neuro: normal sensation, moving all four extremities equally  Skin: mild erythema legs, arms, back, scratch marks, minimal scabs.   MSK: L leg cast  Lymph/Vasc: no LE edema

## 2025-03-14 NOTE — ED ADULT TRIAGE NOTE - CHIEF COMPLAINT QUOTE
Pt arrives via EMS from [facility] c/o generalized rash after diaper rash cream was applied to entire body 2 days ago. Also c/o L foot pain x2 wks s/p sprain. PHx: schizoaffective, CVA (L sided weakness), hypothyroid, seizures, asthma, DM, cardiomegaly, DVT.

## 2025-03-14 NOTE — ED PROVIDER NOTE - ATTENDING CONTRIBUTION TO CARE
Patient alert and oriented x 3 complaining of rash, no rash noted no excoriation no redness no raised bumps.  Throat no evidence of angioedema lungs clear abdomen soft nontender extremities no edema.  Patient just complaining of generalized itching.

## 2025-03-15 ENCOUNTER — NON-APPOINTMENT (OUTPATIENT)
Age: 40
End: 2025-03-15

## 2025-03-15 ENCOUNTER — EMERGENCY (EMERGENCY)
Facility: HOSPITAL | Age: 40
LOS: 1 days | Discharge: HOME | End: 2025-03-15
Attending: EMERGENCY MEDICINE
Payer: MEDICARE

## 2025-03-15 VITALS
OXYGEN SATURATION: 99 % | TEMPERATURE: 98 F | WEIGHT: 293 LBS | HEIGHT: 68 IN | HEART RATE: 106 BPM | DIASTOLIC BLOOD PRESSURE: 75 MMHG | SYSTOLIC BLOOD PRESSURE: 114 MMHG | RESPIRATION RATE: 16 BRPM

## 2025-03-15 VITALS — SYSTOLIC BLOOD PRESSURE: 122 MMHG | TEMPERATURE: 99 F | DIASTOLIC BLOOD PRESSURE: 74 MMHG | HEART RATE: 102 BPM

## 2025-03-15 DIAGNOSIS — Z90.49 ACQUIRED ABSENCE OF OTHER SPECIFIED PARTS OF DIGESTIVE TRACT: Chronic | ICD-10-CM

## 2025-03-15 PROCEDURE — 96374 THER/PROPH/DIAG INJ IV PUSH: CPT

## 2025-03-15 PROCEDURE — 99284 EMERGENCY DEPT VISIT MOD MDM: CPT | Mod: 25

## 2025-03-15 PROCEDURE — 99284 EMERGENCY DEPT VISIT MOD MDM: CPT

## 2025-03-15 RX ORDER — MONTELUKAST SODIUM 10 MG/1
10 TABLET ORAL ONCE
Refills: 0 | Status: COMPLETED | OUTPATIENT
Start: 2025-03-15 | End: 2025-03-15

## 2025-03-15 RX ORDER — HYDROXYZINE HYDROCHLORIDE 25 MG/1
25 TABLET, FILM COATED ORAL ONCE
Refills: 0 | Status: COMPLETED | OUTPATIENT
Start: 2025-03-15 | End: 2025-03-15

## 2025-03-15 RX ORDER — GABAPENTIN 400 MG/1
300 CAPSULE ORAL ONCE
Refills: 0 | Status: COMPLETED | OUTPATIENT
Start: 2025-03-15 | End: 2025-03-15

## 2025-03-15 RX ADMIN — MONTELUKAST SODIUM 10 MILLIGRAM(S): 10 TABLET ORAL at 15:14

## 2025-03-15 RX ADMIN — Medication 1000 MILLILITER(S): at 15:13

## 2025-03-15 RX ADMIN — Medication 20 MILLIGRAM(S): at 15:14

## 2025-03-15 RX ADMIN — HYDROXYZINE HYDROCHLORIDE 25 MILLIGRAM(S): 25 TABLET, FILM COATED ORAL at 16:09

## 2025-03-15 RX ADMIN — GABAPENTIN 300 MILLIGRAM(S): 400 CAPSULE ORAL at 15:14

## 2025-03-15 NOTE — ED PROVIDER NOTE - OBJECTIVE STATEMENT
39-year-old nursing home female with history of CVA with mild left-sided weakness, seizures, prediabetes, asthma, factor V [place], bipolar, hypothyroid, GERD, LMP beginning of March, allergic to Zofran, Toradol, tramadol, PCN with hives and itchiness.  Patient brought in by ambulance claims she is allergic to pineapple.  She had a fruit cup for lunch today.  While chewing on the pineapple, she suddenly did not feel well, developed eyes/throat/facial itchiness, SOB.  Patient denies swelling, wheezing, abdominal pain, vomiting.  She spit out the pineapple right away.  She was given Benadryl 50 mg and Decadron 10 mg IV push by EMS  With some relief.  Patient also added that she has been on Percocet for her sprained ankle pain and is asking for Percocet, also gabapentin

## 2025-03-15 NOTE — ED ADULT NURSE NOTE - OBJECTIVE STATEMENT
Patient came to the ED a/o BIBA for allergic reaction after eating pineapples in the nursing home today. Pt denies any SOB (on 2L NC for comfort), no respiratory distress, no rash/ hives, + throat and generalized itching.

## 2025-03-15 NOTE — ED ADULT NURSE NOTE - OBJECTIVE STATEMENT
Patient received in 26A. Patient is AOx4, ambulatory at baseline, able to speak in full sentences, c/o rash. Patient has a past medical history of HTN, DM and bipolar disorder. Patient c/o rash s/p diaper cream being placed by PCA at NH. Patients skin noted to be dry; no redness or open wounds or active bleeding noted. Patient in left ankle boot s/p injury. Patient medicated per chart. Respirations even and unlabored, chest rise symmetrical b/l. Denies chest pain, N/V, fever or chills. Comfort measures maintained Bed in lowest position. Safety maintained.

## 2025-03-15 NOTE — ED PROVIDER NOTE - PROGRESS NOTE DETAILS
Patient feeling much better, denies itchiness, SOB, and wants to go back to nursing home.   patient also requesting for ambulance, will discharge to nursing home  Pt is well appearing, has no new complaints and able to walk with normal gait. Pt is stable for discharge and follow up with medical doctor. Pt educated on care and need for follow up. Discussed anticipatory guidance and return precautions. Questions answered. I had a detailed discussion with the patient regarding the historical points, exam findings, and any diagnostic results supporting the discharge diagnosis.

## 2025-03-15 NOTE — ED ADULT NURSE NOTE - NSFALLRISKINTERV_ED_ALL_ED
Communicate fall risk and risk factors to all staff, patient, and family/Provide patient with walking aids/Provide visual cue: yellow wristband, yellow gown, etc/Reinforce activity limits and safety measures with patient and family/Call bell, personal items and telephone in reach/Instruct patient to call for assistance before getting out of bed/chair/stretcher/Non-slip footwear applied when patient is off stretcher/[place] to call system/Physically safe environment - no spills, clutter or unnecessary equipment/Purposeful Proactive Rounding/Room/bathroom lighting operational, light cord in reach

## 2025-03-15 NOTE — ED ADULT NURSE NOTE - CHIEF COMPLAINT QUOTE
BIBA, sending from [facility] d/t allergic reactions from Pinapple, c/o shortness of breath, itching to her throat, nausea and abdominal pain, CVA on 2019 and left side weakness, received Benadryl 50 mg and dexamethasone 10 mg by left AC

## 2025-03-15 NOTE — ED ADULT NURSE NOTE - NSFALLRISKINTERV_ED_ALL_ED

## 2025-03-15 NOTE — ED ADULT TRIAGE NOTE - PATIENT'S PREFERRED PRONOUN
I have personally seen and examined the patient. I have collaborated with and supervised the Her/She

## 2025-03-15 NOTE — ED PROVIDER NOTE - CLINICAL SUMMARY MEDICAL DECISION MAKING FREE TEXT BOX
39-year-old female with history of allergy to pineapple, developed allergic reaction while chewing on pineapple today.  Patient received Decadron and Benadryl from EMS.  Patient with allergic reaction, no SOB, O2 sat 99%, will give Pepcid and singular and reassess

## 2025-03-15 NOTE — ED PROVIDER NOTE - MUSCULOSKELETAL, MLM
Spine appears normal, range of motion is not limited, no muscle or joint tenderness, no CVAT, LLE> RLE-chronic, calves nontender to palpation

## 2025-03-15 NOTE — ED ADULT TRIAGE NOTE - CHIEF COMPLAINT QUOTE
BIBA, sending from [facility] d/t allergic reactions from Pinapple, c/o shortness of breath, itching to her throat, nausea and abdominal pain, CVA on 2019 and left side weakness, received Benadryl 50 mg and dexamethasone 10 mg by left AC

## 2025-03-15 NOTE — ED PROVIDER NOTE - PATIENT PORTAL LINK FT
You can access the FollowMyHealth Patient Portal offered by [facility] by registering at the following website: [email or web address]. By joining [employer]’s FollowMyHealth portal, you will also be able to view your health information using other applications (apps) compatible with our system.

## 2025-03-15 NOTE — ED PROVIDER NOTE - NSFOLLOWUPINSTRUCTIONS_ED_ALL_ED_FT
Food Allergy  Foods that are high in protein, including nuts, peanut butter, cheese, chicken, fish, beans, yogurt, and milk.  A food allergy is an abnormal reaction to a food (food allergen) by the body's defense system (immune system). Foods that commonly cause allergies include:  Milk.  Seafood.  Eggs.  Peanuts.  Wheat.  Soy.  Tree nuts such as pecans, walnuts, and cashews.  What are the causes?  Food allergies happen when the immune system sees a food as harmful and releases proteins (antibodies) to fight it.    What are the signs or symptoms?  Symptoms may be mild or severe. They usually start minutes after eating the food, but they can occur even a few hours later. In people with a severe allergy, symptoms can start within seconds.    Mild symptoms of this condition include:  Congested nose.  Tingling in the mouth.  An itchy, red rash.  Vomiting.  Diarrhea.  In people with a severe allergy, a life-threatening reaction can occur called anaphylaxis. Get help right away if you have symptoms of anaphylaxis, such as:  Feeling warm in the face (flushed). This may include redness.  Itchy, red, swollen areas of skin (hives).  Swelling of the eyes, lips, face, mouth, tongue, or throat.  Difficulty breathing, speaking, or swallowing.  Noisy breathing, high-pitched whistling sounds when you breathe, most often when you breathe out (wheezing).  Dizziness, light-headedness, or fainting.  Pain or cramping in the abdomen.  How is this diagnosed?  This condition may be diagnosed based on:  A physical exam.  Your medical history.  Skin tests.  Blood tests.  A food challenge test. This test involves eating the food that may be causing the allergic response while being monitored for a reaction by your health care provider.  The results of an elimination diet. The elimination diet involves removing foods from your diet and then adding them back in, one at a time.  A food diary.  How is this treated?  There is no cure for food allergies. Treatment focuses on preventing exposure to the food or foods you are allergic to and treating reactions if you are exposed to the food. Mild symptoms may not need treatment.    Severe reactions usually need to be treated at a hospital. Treatment may include:  Medicines that help:  Tighten your blood vessels (epinephrine).  Relieve itching and hives (antihistamines).  Widen the narrow and tight airways (bronchodilators).  Reduce swelling (corticosteroids).  Oxygen therapy to help you breathe.  IV fluids to keep you hydrated.  After a severe reaction, you may be given rescue medicines, such as:  An anaphylaxis kit.  An epinephrine injection, commonly called an auto-injector "pen" (pre-filled automatic epinephrine injection device).  Your health care provider may teach you how to use these if you are accidentally exposed to an allergen.    Follow these instructions at home:  If you have a potential allergy:    Follow the elimination diet as told by your health care provider.  Keep a food diary as told by your health care provider. Every day, write down:  What you eat and drink and when.  What symptoms you have and when.  If you have a severe allergy:    A person using an auto-injector pen in the thigh.  Wear a medical alert bracelet or necklace that describes your allergy.  Carry your anaphylaxis kit or an auto-injector pen with you at all times. Use them as told by your health care provider.  Make sure that you, your family members, and your employer know:  The signs of anaphylaxis.  How to use an anaphylaxis kit.  How to use an auto-injector pen.  If you think that you are having an anaphylactic reaction, use your auto-injector pen or anaphylaxis kit.  Replace your epinephrine immediately after you use your auto-injector pen. This is important if you have another reaction. If possible, carry two epinephrine auto-injector pens.  Get medical care after using your auto-injector pen. This is important because you can have a delayed, life-threatening reaction after taking the medicine (rebound anaphylaxis).  General instructions    Avoid the foods that you are allergic to.  Read food labels before you eat packaged items. Look for ingredients that you are allergic to.  When you are at a restaurant, tell your  that you have an allergy. If you are unsure whether a meal has an ingredient that you are allergic to, ask your .  Take over-the-counter and prescription medicines only as told by your health care provider.  Do not drive or operate machinery until your health care provider says that it is safe.  Inform all of your health care providers that you have a food allergy.  Work with your health care providers to make an anaphylaxis plan. Preparation is important.  If you think that you might be allergic to something else, talk with your health care provider. Do not eat a food to see if you are allergic to it without talking with your health care provider first.  Contact a health care provider if:  You have symptoms that do not go away within 2 days.  You have symptoms that get worse.  You have new symptoms.  Get help right away if you have symptoms of anaphylaxis:  Flushed skin.  Hives.  Swelling of the eyes, lips, face, mouth, tongue, or throat.  Difficulty breathing, speaking, or swallowing.  Wheezing.  Dizziness, light-headedness or fainting.  Pain or cramping in the abdomen.  These symptoms may represent a serious problem that is an emergency. Do not wait to see if the symptoms will go away. Use your auto-injector pen or anaphylaxis kit as you have been told. Get medical help right away. Call your local emergency services (911 in the [place] Do not drive yourself to the hospital.    If you needed to use an auto-injector pen, you need more medical care even if the medicine seems to be helping. This is important because anaphylaxis may happen again within 72 hours.    Summary  A food allergy is an abnormal reaction to a food (food allergen) by the body's defense system (immune system).  There is no cure for food allergies. Treatment focuses on preventing exposure to the food or foods you are allergic to and treating reactions if you are exposed to the food.  Wear a medical alert bracelet or necklace that describes your allergy.  If you have symptoms of anaphylaxis, use your auto-injector pen or anaphylaxis kit as you have been instructed, and get medical help right away.  This information is not intended to replace advice given to you by your health care provider. Make sure you discuss any questions you have with your health care provider.      Take Benadryl 25 mg 1 tablet 4 times a day   Benadryl causes drowsiness   you received Decadron 10 mg by EMS   take Pepcid 20 mg 1 tablet 2 times a day, also for your allergy   follow-up with your medical doctors and allergist if no improvement

## 2025-03-15 NOTE — ED PROVIDER NOTE - ENMT, MLM
Airway patent, Nasal mucosa clear. Mouth with normal mucosa. Throat has no vesicles, no oropharyngeal exudates and uvula is midline.   no tongue swelling, throat swelling, no stridor

## 2025-03-16 ENCOUNTER — EMERGENCY (EMERGENCY)
Facility: HOSPITAL | Age: 40
LOS: 1 days | Discharge: HOME | End: 2025-03-16
Attending: EMERGENCY MEDICINE
Payer: MEDICARE

## 2025-03-16 VITALS
HEART RATE: 70 BPM | TEMPERATURE: 98 F | OXYGEN SATURATION: 97 % | WEIGHT: 293 LBS | DIASTOLIC BLOOD PRESSURE: 86 MMHG | SYSTOLIC BLOOD PRESSURE: 126 MMHG | HEIGHT: 68 IN | RESPIRATION RATE: 17 BRPM

## 2025-03-16 VITALS
OXYGEN SATURATION: 99 % | RESPIRATION RATE: 17 BRPM | TEMPERATURE: 98 F | DIASTOLIC BLOOD PRESSURE: 80 MMHG | HEART RATE: 93 BPM | SYSTOLIC BLOOD PRESSURE: 111 MMHG

## 2025-03-16 DIAGNOSIS — Z90.49 ACQUIRED ABSENCE OF OTHER SPECIFIED PARTS OF DIGESTIVE TRACT: Chronic | ICD-10-CM

## 2025-03-16 PROCEDURE — 99284 EMERGENCY DEPT VISIT MOD MDM: CPT

## 2025-03-16 PROCEDURE — 99283 EMERGENCY DEPT VISIT LOW MDM: CPT

## 2025-03-16 RX ORDER — GABAPENTIN 400 MG/1
300 CAPSULE ORAL ONCE
Refills: 0 | Status: COMPLETED | OUTPATIENT
Start: 2025-03-16 | End: 2025-03-16

## 2025-03-16 RX ORDER — DIPHENHYDRAMINE HCL 12.5MG/5ML
25 ELIXIR ORAL ONCE
Refills: 0 | Status: COMPLETED | OUTPATIENT
Start: 2025-03-16 | End: 2025-03-16

## 2025-03-16 RX ADMIN — GABAPENTIN 300 MILLIGRAM(S): 400 CAPSULE ORAL at 19:34

## 2025-03-16 RX ADMIN — Medication 25 MILLIGRAM(S): at 19:34

## 2025-03-16 RX ADMIN — Medication 20 MILLIGRAM(S): at 19:34

## 2025-03-16 NOTE — ED ADULT NURSE NOTE - CHIEF COMPLAINT QUOTE
Patient sent from [facility] for allergic Reaction due to diaper cream, patient wAS SEEN 2 X YESTERDAY AT [facility]

## 2025-03-16 NOTE — ED PROVIDER NOTE - PATIENT PORTAL LINK FT
You can access the FollowMyHealth Patient Portal offered by [facility] by registering at the following website: [email or web address]. By joining [employer]’s FollowMyHealth portal, you will also be able to view your health information using other applications (apps) compatible with our system.

## 2025-03-16 NOTE — ED PROVIDER NOTE - NSFOLLOWUPINSTRUCTIONS_ED_ALL_ED_FT
Urticaria    WHAT YOU NEED TO KNOW:    What is urticaria? Urticaria is also called hives. Hives can change size and shape, and appear anywhere on your skin. They can be mild or severe and last from a few minutes to a few days. Hives may be a sign of a severe allergic reaction called anaphylaxis that needs immediate treatment. Urticaria that lasts longer than 6 weeks may be a chronic condition that needs long-term treatment.  Hives    What causes urticaria? Hives are caused by an immune system reaction. The following are common triggers:    Food allergies, such as to nuts, eggs, or shellfish    Food dyes, additives, or preservatives    Medicine allergies, such as to ibuprofen or antibiotics    Infections, such as a cold or mono    Bug bites    Pets, plants, or latex    Stress  How is the cause of urticaria diagnosed? Your healthcare provider will examine you and ask about your symptoms. He or she may also ask about your family medical history, medicines you take, and foods you eat. Tell your provider about any recent trauma, stress, or contact with allergens. You may need additional testing if you developed anaphylaxis after you were exposed to a trigger and then exercised. This is called exercise-induced anaphylaxis. You may need any of the following:    A skin test is used to see how your skin reacts to possible triggers. Your provider will put a small amount of the trigger onto your skin. He or she will cover the area with a patch that stays on for 2 days. Then he or she will check your skin for a reaction.    A challenge test is used to give you increasing doses of what may be causing your hives. Your provider will watch for a reaction.  How is urticaria treated? Hives often go away without treatment. Chronic urticaria may need to be treated with more than one medicine, or other medicines than listed below. The following are common medicines used to treat urticaria:    Antihistamines decrease mild symptoms such as itching or a rash.    Steroids decrease redness, pain, and swelling.    Epinephrine is used to treat severe allergic reactions such as anaphylaxis.  What steps do I need to take for signs or symptoms of anaphylaxis?    Immediately give 1 shot of epinephrine only into the outer thigh muscle.  How to Give An Epinephrine Shot Adult      Leave the shot in place as directed. Your provider may recommend you leave it in place for up to 10 seconds before you remove it. This helps make sure all of the epinephrine is delivered.    Call 911 and go to the emergency department, even if the shot improved symptoms. Do not drive yourself. Bring the used epinephrine shot with you.  What safety precautions do I need to take if I am at risk for anaphylaxis?    Keep 2 shots of epinephrine with you at all times. You may need a second shot, because epinephrine only works for about 20 minutes and symptoms may return. Your provider can show you and family members how to give the shot. Check the expiration date every month and replace it before it expires.    Create an action plan. Your provider can help you create a written plan that explains the allergy and an emergency plan to treat a reaction. The plan explains when to give a second epinephrine shot if symptoms return or do not improve after the first. Give copies of the action plan and emergency instructions to family members, work and school staff, and  providers. Show them how to give a shot of epinephrine.    Be careful when you exercise. If you have had exercise-induced anaphylaxis, do not exercise right after you eat. Stop exercising right away if you start to develop any signs or symptoms of anaphylaxis. You may first feel tired, warm, or have itchy skin. Hives, swelling, and severe breathing problems may develop if you continue to exercise.    Carry medical alert identification. Wear medical alert jewelry or carry a card that explains the allergy. Ask your provider where to get these items.  Medical Alert Jewelry      Keep a record of triggers and symptoms. Record everything you eat, drink, or apply to your skin for 3 weeks. Include stressful events and what you were doing right before your hives started. Bring the record with you to follow-up visits with your provider.  What can I do to manage urticaria?    Cool your skin. This may help decrease itching. Apply a cool pack to your hives. Dip a hand towel in cool water, wring it out, and place it on your hives. You may also soak your skin in a cool oatmeal bath.    Do not rub your hives. This can irritate your skin and cause more hives.    Wear loose clothing. Tight clothes may irritate your skin and cause more hives.    Manage stress. Stress may trigger hives, or make them worse. Learn new ways to relax, such as deep breathing.  Call your local emergency number (911 in the [place]) for signs or symptoms of anaphylaxis, such as trouble breathing, swelling in your mouth or throat, or wheezing. You may also have itching, a rash, or feel like you are going to faint.    When should I seek immediate care?    Your heart is beating faster than it normally does.    You have cramping or severe pain in your abdomen.  When should I call my doctor?    You have a fever.    Your skin still itches 24 hours after you take your medicine.    You still have hives after 7 days.    Your joints are painful and swollen.    You have questions or concerns about your condition or care.  CARE AGREEMENT:    You have the right to help plan your care. Learn about your health condition and how it may be treated. Discuss treatment options with your healthcare providers to decide what care you want to receive. You always have the right to refuse treatment.      Take Benadryl 1 tablet 4 times a day as needed for rash and itchiness   Benadryl causes drowsiness   take Pepcid 1 tablet 2 times a day also for your rash   you need to follow-up with your medical doctor   you may need referral to be evaluated by an allergist

## 2025-03-16 NOTE — ED PROVIDER NOTE - ENMT, MLM
Airway patent, Nasal mucosa clear. Mouth with normal mucosa. Throat has no vesicles, no oropharyngeal exudates and uvula is midline.   no throat swelling, stridor negative

## 2025-03-16 NOTE — ED PROVIDER NOTE - OBJECTIVE STATEMENT
39-year-old female wheelchair-bound from nursing home with history of CVA with left-sided weakness, DM, epilepsy, schizoaffective disorder, bipolar disorder, peripheral vascular disease, GERD, IBS, LMP beginning of March.  Patient was seen in ED yesterday with allergic reaction to pineapple, felt better.  Now returns and claims her HHA applied A&D ointment on her contact around 11:45 AM, 3 minutes later, patient developed itchiness to her buttock, then her whole body also with left arm rash.  She denies throat itchiness, SOB, throat swelling, facial swelling.  Patient received Decadron 10 mg yesterday by EMS.  She took nothing for her itchiness today.  Patient is also asking for Percocet and gabapentin for her chronic pain and left ankle pain

## 2025-03-16 NOTE — ED ADULT TRIAGE NOTE - CHIEF COMPLAINT QUOTE
Patient sent from [facility] for allergic Reaction due to diaper cream, patient wAS SEEN 2 X YESTERDAY AT [facility]

## 2025-03-16 NOTE — ED PROVIDER NOTE - CLINICAL SUMMARY MEDICAL DECISION MAKING FREE TEXT BOX
39-year-old female who claims she developed and itchiness after her aide apply A&D ointment on her buttock.  No rash noted on her body except for left forearm, few hives noted.  Will give Benadryl, Pepcid.  Since patient received Decadron yesterday, no steroid needed at this time, reassess

## 2025-03-16 NOTE — ED PROVIDER NOTE - MUSCULOSKELETAL, MLM
Spine appears normal, range of motion is not limited, no muscle or joint tenderness, no CVAT, left upper arm slightly weak

## 2025-03-16 NOTE — ED ADULT NURSE NOTE - NSFALLRISKINTERV_ED_ALL_ED
Assistance OOB with selected safe patient handling equipment if applicable/Communicate fall risk and risk factors to all staff, patient, and family/Provide patient with walking aids/Provide visual cue: yellow wristband, yellow gown, etc/Reinforce activity limits and safety measures with patient and family/Call bell, personal items and telephone in reach/Instruct patient to call for assistance before getting out of bed/chair/stretcher/Non-slip footwear applied when patient is off stretcher/[place] to call system/Physically safe environment - no spills, clutter or unnecessary equipment/Purposeful Proactive Rounding/Room/bathroom lighting operational, light cord in reach

## 2025-03-16 NOTE — ED PROVIDER NOTE - PROGRESS NOTE DETAILS
Patient is feeling much better, wants to go back to nursing home.  Patient insists to be transferred by ambulance

## 2025-03-16 NOTE — ED ADULT TRIAGE NOTE - BIRTH SEX
6 = Severely ill - disruptive pathology, behavior and function are frequently influenced by symptoms, may require assistance from others Female 6 = Severely ill - disruptive pathology, behavior and function are frequently influenced by symptoms, may require assistance from others 6 = Severely ill - disruptive pathology, behavior and function are frequently influenced by symptoms, may require assistance from others 5 = Markedly ill - intrusive symptoms that distinctly impair social/occupational function or cause intrusive levels of distress 6 = Severely ill - disruptive pathology, behavior and function are frequently influenced by symptoms, may require assistance from others 6 = Severely ill - disruptive pathology, behavior and function are frequently influenced by symptoms, may require assistance from others 6 = Severely ill - disruptive pathology, behavior and function are frequently influenced by symptoms, may require assistance from others 4 = Moderately ill – overt symptoms causing noticeable, but modest, functional impairment or distress; symptom level may warrant medication

## 2025-03-18 ENCOUNTER — NON-APPOINTMENT (OUTPATIENT)
Age: 40
End: 2025-03-18

## 2025-03-19 NOTE — PHYSICAL THERAPY INITIAL EVALUATION ADULT - LEVEL OF INDEPENDENCE: SIT/SUPINE, REHAB EVAL
Lumbar epidural steroid injection L5-S1 with fluoroscopic guidance   Pt required assistance moving left LE/minimum assist (75% patients effort)

## 2025-03-30 ENCOUNTER — EMERGENCY (EMERGENCY)
Facility: HOSPITAL | Age: 40
LOS: 1 days | Discharge: HOME | End: 2025-03-30
Attending: STUDENT IN AN ORGANIZED HEALTH CARE EDUCATION/TRAINING PROGRAM
Payer: MEDICARE

## 2025-03-30 VITALS
RESPIRATION RATE: 20 BRPM | TEMPERATURE: 98 F | DIASTOLIC BLOOD PRESSURE: 76 MMHG | OXYGEN SATURATION: 95 % | SYSTOLIC BLOOD PRESSURE: 98 MMHG | HEART RATE: 120 BPM | HEIGHT: 68 IN | WEIGHT: 293 LBS

## 2025-03-30 DIAGNOSIS — Z90.49 ACQUIRED ABSENCE OF OTHER SPECIFIED PARTS OF DIGESTIVE TRACT: Chronic | ICD-10-CM

## 2025-03-30 LAB
ALBUMIN SERPL ELPH-MCNC: 3.4 G/DL — LOW (ref 3.5–5)
ALP SERPL-CCNC: 70 U/L — SIGNIFICANT CHANGE UP (ref 40–120)
ALT FLD-CCNC: 44 U/L DA — SIGNIFICANT CHANGE UP (ref 10–60)
ANION GAP SERPL CALC-SCNC: 5 MMOL/L — SIGNIFICANT CHANGE UP (ref 5–17)
APTT BLD: 30.6 SEC — SIGNIFICANT CHANGE UP (ref 24.5–35.6)
AST SERPL-CCNC: 23 U/L — SIGNIFICANT CHANGE UP (ref 10–40)
BASOPHILS NFR BLD AUTO: 0.2 % — SIGNIFICANT CHANGE UP (ref 0–2)
BILIRUB SERPL-MCNC: 0.2 MG/DL — SIGNIFICANT CHANGE UP (ref 0.2–1.2)
BUN SERPL-MCNC: 10 MG/DL — SIGNIFICANT CHANGE UP (ref 7–18)
CHLORIDE SERPL-SCNC: 105 MMOL/L — SIGNIFICANT CHANGE UP (ref 96–108)
CO2 SERPL-SCNC: 27 MMOL/L — SIGNIFICANT CHANGE UP (ref 22–31)
CREAT SERPL-MCNC: 0.9 MG/DL — SIGNIFICANT CHANGE UP (ref 0.5–1.3)
EGFR: 84 ML/MIN/1.73M2 — SIGNIFICANT CHANGE UP
EGFR: 84 ML/MIN/1.73M2 — SIGNIFICANT CHANGE UP
EOSINOPHIL # BLD AUTO: 0.02 K/UL — SIGNIFICANT CHANGE UP (ref 0–0.5)
GLUCOSE SERPL-MCNC: 146 MG/DL — HIGH (ref 70–99)
HCG SERPL-ACNC: <1 MIU/ML — SIGNIFICANT CHANGE UP
HCT VFR BLD CALC: 32.2 % — LOW (ref 34.5–45)
HGB BLD-MCNC: 10.6 G/DL — LOW (ref 11.5–15.5)
LIDOCAIN IGE QN: 28 U/L — SIGNIFICANT CHANGE UP (ref 13–75)
LYMPHOCYTES # BLD AUTO: 1.95 K/UL — SIGNIFICANT CHANGE UP (ref 1–3.3)
LYMPHOCYTES # BLD AUTO: 37.6 % — SIGNIFICANT CHANGE UP (ref 13–44)
MCHC RBC-ENTMCNC: 31.3 PG — SIGNIFICANT CHANGE UP (ref 27–34)
MCHC RBC-ENTMCNC: 32.9 G/DL — SIGNIFICANT CHANGE UP (ref 32–36)
MCV RBC AUTO: 95 FL — SIGNIFICANT CHANGE UP (ref 80–100)
MONOCYTES # BLD AUTO: 0.52 K/UL — SIGNIFICANT CHANGE UP (ref 0–0.9)
MONOCYTES NFR BLD AUTO: 10 % — SIGNIFICANT CHANGE UP (ref 2–14)
NEUTROPHILS # BLD AUTO: 2.67 K/UL — SIGNIFICANT CHANGE UP (ref 1.8–7.4)
NEUTROPHILS NFR BLD AUTO: 51.4 % — SIGNIFICANT CHANGE UP (ref 43–77)
NRBC BLD AUTO-RTO: 0 /100 WBCS — SIGNIFICANT CHANGE UP (ref 0–0)
PLATELET # BLD AUTO: 141 K/UL — LOW (ref 150–400)
POTASSIUM SERPL-MCNC: 4.3 MMOL/L — SIGNIFICANT CHANGE UP (ref 3.5–5.3)
POTASSIUM SERPL-SCNC: 4.3 MMOL/L — SIGNIFICANT CHANGE UP (ref 3.5–5.3)
PROT SERPL-MCNC: 6.9 G/DL — SIGNIFICANT CHANGE UP (ref 6–8.3)
PROTHROM AB SERPL-ACNC: 13.1 SEC — SIGNIFICANT CHANGE UP (ref 9.9–13.4)
RBC # BLD: 3.39 M/UL — LOW (ref 3.8–5.2)
RBC # FLD: 15.1 % — HIGH (ref 10.3–14.5)
SODIUM SERPL-SCNC: 137 MMOL/L — SIGNIFICANT CHANGE UP (ref 135–145)
WBC # BLD: 5.19 K/UL — SIGNIFICANT CHANGE UP (ref 3.8–10.5)
WBC # FLD AUTO: 5.19 K/UL — SIGNIFICANT CHANGE UP (ref 3.8–10.5)

## 2025-03-30 PROCEDURE — 99284 EMERGENCY DEPT VISIT MOD MDM: CPT

## 2025-03-30 RX ORDER — QUETIAPINE FUMARATE 25 MG/1
200 TABLET ORAL ONCE
Refills: 0 | Status: COMPLETED | OUTPATIENT
Start: 2025-03-30 | End: 2025-03-30

## 2025-03-30 RX ORDER — GABAPENTIN 400 MG/1
300 CAPSULE ORAL ONCE
Refills: 0 | Status: COMPLETED | OUTPATIENT
Start: 2025-03-30 | End: 2025-03-30

## 2025-03-30 RX ORDER — DIPHENHYDRAMINE HCL 12.5MG/5ML
50 ELIXIR ORAL ONCE
Refills: 0 | Status: COMPLETED | OUTPATIENT
Start: 2025-03-30 | End: 2025-03-30

## 2025-03-30 RX ORDER — SODIUM CHLORIDE 9 G/1000ML
1000 INJECTION, SOLUTION INTRAVENOUS ONCE
Refills: 0 | Status: COMPLETED | OUTPATIENT
Start: 2025-03-30 | End: 2025-03-30

## 2025-03-30 RX ADMIN — GABAPENTIN 300 MILLIGRAM(S): 400 CAPSULE ORAL at 22:16

## 2025-03-30 RX ADMIN — QUETIAPINE FUMARATE 200 MILLIGRAM(S): 25 TABLET ORAL at 23:27

## 2025-03-30 RX ADMIN — SODIUM CHLORIDE 1000 MILLILITER(S): 9 INJECTION, SOLUTION INTRAVENOUS at 22:43

## 2025-03-30 NOTE — ED ADULT TRIAGE NOTE - CADM TRG TX PRIOR TO ARRIVAL
Patient called office and states that he has been using a self test machine that his wife bought him. States his last result this week was 2.6. States he has 40 tablets of warfarin left. He states he is taking is medication as prescribed. Advised patient that per AAC guidelines he has to be seen by the referring provider yearly in order for our office to be able to continue to manage his warfarin, even with a self test machine. Patient was last seen by Dr Mina 4/22/22. We have advised him of this multiple times (see previous telephone encounters and anticoagulation visits) and he did not schedule an appointment with Dr Mina. Patient states he only comes up to [place] for about 2 weeks at a time otherwise he is located where his job takes him. He confirms he currently is residing in [place] and will not be back in [place] until October. Advised patient that it would be safest for him to establish with a provider in [place] as this is where is resides. Advised if he is not able to get into a provider prior to running out of medication that he will need to contact Dr Mina for future refills of warfarin, but due to not being seen by Dr Mina this could get denied. Patient states he will work on finding a provider in [place] and understands that our office will no longer be managing his warfarin. He understands the risks of not following up in regards to his warfarin management that include: blood clot in extremities, heart attack, stroke, brain bleed, hemorrhage, or even death. Patient verbalizes understanding.    none

## 2025-03-30 NOTE — ED PROVIDER NOTE - PATIENT PORTAL LINK FT
You can access the FollowMyHealth Patient Portal offered by [facility] by registering at the following website: [email or web address]. By joining [facility]’s FollowMyHealth portal, you will also be able to view your health information using other applications (apps) compatible with our system.

## 2025-03-30 NOTE — ED ADULT NURSE NOTE - OBJECTIVE STATEMENT
Pt complains she has been nasuea since last week and when she was eating today, pt could no longer eat because she felt a tightening pain in the LRQ.

## 2025-03-30 NOTE — ED PROVIDER NOTE - OBJECTIVE STATEMENT
39-year-old female with past medical history obesity, factor V Leiden deficiency complicated by DVT/PE (on Eliquis), bipolar disorder, hypothyroidism, psychogenic nonepileptic seizures, IBS, GERD, pelvic inflammatory disease, endometriosis, asthma, personality disorder, mild flexural disability, CVA, frequent ED visits for abdominal pain presents with abdominal pain.  Patient reports nausea over the past week, reports left upper quadrant and left mid abdominal pain today.  Denies any fevers, chest pain, shortness of breath, vomiting, diarrhea, bloody stools, black tarry stools, dysuria, vaginal bleeding, numbness, focal weakness, rash.  Surgical history significant for appendectomy in the past.  Patient states she had normal formed brown bowel movement today.  Denies any additional complaints. 39-year-old female with past medical history obesity, factor V Leiden deficiency complicated by DVT/PE (on Eliquis), bipolar disorder, hypothyroidism, psychogenic nonepileptic seizures, IBS, GERD, pelvic inflammatory disease, endometriosis, asthma, personality disorder, mild intellectual disability, CVA, frequent ED visits for abdominal pain presents with abdominal pain.  Patient reports nausea over the past week, reports left upper quadrant and left mid abdominal pain today.  Denies any fevers, chest pain, shortness of breath, vomiting, diarrhea, bloody stools, black tarry stools, dysuria, vaginal bleeding, numbness, focal weakness, rash.  Surgical history significant for appendectomy in the past.  Patient states she had normal formed brown bowel movement today.  Denies any additional complaints.

## 2025-03-30 NOTE — ED PROVIDER NOTE - PROGRESS NOTE DETAILS
Lucks-DO: patient declining CT and urinalysis, tolerating PO intake, pain well controlled. Will DC with outpatient follow up/return precautions.

## 2025-03-30 NOTE — ED PROVIDER NOTE - CLINICAL SUMMARY MEDICAL DECISION MAKING FREE TEXT BOX
Yas: 39-year-old female with past medical history obesity, factor V Leiden deficiency complicated by DVT/PE (on Eliquis), bipolar disorder, hypothyroidism, psychogenic nonepileptic seizures, IBS, GERD, pelvic inflammatory disease, endometriosis, asthma, personality disorder, mild flexural disability, CVA, frequent ED visits for abdominal pain presents with abdominal pain.  Patient reports nausea over the past week, reports left upper quadrant and left mid abdominal pain today.  Denies any fevers, chest pain, shortness of breath, vomiting, diarrhea, bloody stools, black tarry stools, dysuria, vaginal bleeding, numbness, focal weakness, rash.  Surgical history significant for appendectomy in the past.  Patient states she had normal formed brown bowel movement today.  Physical exam per above. Patient with chronic abdominal pain, requesting home dose of percocet, gabapentin, and benadryl. Patient tachycardic and hypotensive, history provides no evidence for acute blood loss. Will obtain labs, imaging, provide supportive treatment with dispo pending workup. Yas: 39-year-old female with past medical history obesity, factor V Leiden deficiency complicated by DVT/PE (on Eliquis), bipolar disorder, hypothyroidism, psychogenic nonepileptic seizures, IBS, GERD, pelvic inflammatory disease, endometriosis, asthma, personality disorder, mild intellectual disability, CVA, frequent ED visits for abdominal pain presents with abdominal pain.  Patient reports nausea over the past week, reports left upper quadrant and left mid abdominal pain today.  Denies any fevers, chest pain, shortness of breath, vomiting, diarrhea, bloody stools, black tarry stools, dysuria, vaginal bleeding, numbness, focal weakness, rash.  Surgical history significant for appendectomy in the past.  Patient states she had normal formed brown bowel movement today.  Physical exam per above. Patient with chronic abdominal pain, requesting home dose of percocet, gabapentin, and benadryl. Patient tachycardic and hypotensive, history provides no evidence for acute blood loss. Will obtain labs, imaging, provide supportive treatment with dispo pending workup.

## 2025-03-30 NOTE — ED PROVIDER NOTE - PHYSICAL EXAMINATION
CONSTITUTIONAL: non-toxic, well appearing  SKIN: no rash, no petechiae.  EYES: pink conjunctiva, anicteric  NECK: Supple; no meningismus, no JVD  CARD: RRR, no murmurs, equal radial pulses bilaterally 2+  RESP: CTAB, no respiratory distress  ABD: Soft, tender over LUQ, non-distended, no peritoneal signs  EXT: Normal ROM x4. No edema.   NEURO: Alert, oriented. Neuro exam nonfocal.  PSYCH: Cooperative, appropriate.

## 2025-03-30 NOTE — ED PROVIDER NOTE - NSFOLLOWUPINSTRUCTIONS_ED_ALL_ED_FT
You were seen today for abdominal pain. You declined CT scan and urine testing.     Many things can cause abdominal pain. Many times, abdominal pain is not caused by a disease and will improve without treatment. Your health care provider will do a physical exam to determine if there is a dangerous cause of your pain; blood tests and imaging may help determine the cause of your pain. However, in many cases, no cause may be found and you may need further testing as an outpatient. Monitor your abdominal pain for any changes.     Follow up with your primary care physician.    SEEK IMMEDIATE MEDICAL CARE IF YOU HAVE ANY OF THE FOLLOWING SYMPTOMS: worsening abdominal pain, uncontrollable vomiting, profuse diarrhea, inability to have bowel movements or pass gas, black or bloody stools, fever accompanying chest pain or back pain, or fainting.

## 2025-03-30 NOTE — ED PROVIDER NOTE - NSICDXPASTMEDICALHX_GEN_ALL_CORE_FT
Palliative Care PAST MEDICAL HISTORY:  Asthma     Bipolar disorder     Diabetes     Endometriosis     Factor V Leiden     GERD (gastroesophageal reflux disease)     History of DVT in adulthood RLE on eliquis    HTN (hypertension)     Hypothyroid     Insomnia     Seasonal allergies     Seizure

## 2025-03-31 VITALS
DIASTOLIC BLOOD PRESSURE: 76 MMHG | SYSTOLIC BLOOD PRESSURE: 107 MMHG | RESPIRATION RATE: 18 BRPM | OXYGEN SATURATION: 96 % | TEMPERATURE: 98 F | HEART RATE: 110 BPM

## 2025-03-31 PROCEDURE — 85610 PROTHROMBIN TIME: CPT

## 2025-03-31 PROCEDURE — 85730 THROMBOPLASTIN TIME PARTIAL: CPT

## 2025-03-31 PROCEDURE — 96360 HYDRATION IV INFUSION INIT: CPT

## 2025-03-31 PROCEDURE — 36415 COLL VENOUS BLD VENIPUNCTURE: CPT

## 2025-03-31 PROCEDURE — 83690 ASSAY OF LIPASE: CPT

## 2025-03-31 PROCEDURE — 85025 COMPLETE CBC W/AUTO DIFF WBC: CPT

## 2025-03-31 PROCEDURE — 80053 COMPREHEN METABOLIC PANEL: CPT

## 2025-03-31 PROCEDURE — 99283 EMERGENCY DEPT VISIT LOW MDM: CPT | Mod: 25

## 2025-03-31 PROCEDURE — 96361 HYDRATE IV INFUSION ADD-ON: CPT

## 2025-03-31 PROCEDURE — 84702 CHORIONIC GONADOTROPIN TEST: CPT

## 2025-03-31 RX ORDER — DIPHENHYDRAMINE HCL 12.5MG/5ML
25 ELIXIR ORAL ONCE
Refills: 0 | Status: COMPLETED | OUTPATIENT
Start: 2025-03-31 | End: 2025-03-31

## 2025-03-31 RX ORDER — GABAPENTIN 400 MG/1
300 CAPSULE ORAL ONCE
Refills: 0 | Status: COMPLETED | OUTPATIENT
Start: 2025-03-31 | End: 2025-03-31

## 2025-03-31 RX ADMIN — GABAPENTIN 300 MILLIGRAM(S): 400 CAPSULE ORAL at 02:02

## 2025-03-31 RX ADMIN — Medication 25 MILLIGRAM(S): at 02:57

## 2025-03-31 RX ADMIN — Medication 50 MILLIGRAM(S): at 00:24

## 2025-03-31 RX ADMIN — SODIUM CHLORIDE 1000 MILLILITER(S): 9 INJECTION, SOLUTION INTRAVENOUS at 01:47

## 2025-03-31 NOTE — ED ADULT TRIAGE NOTE - DOMESTIC TRAVEL HIGH RISK QUESTION
Continue Regimen: Ciclopirox cream
Detail Level: Zone
Render In Strict Bullet Format?: No
Plan: Vaseline or aquaphor as needed
No

## 2025-04-01 ENCOUNTER — EMERGENCY (EMERGENCY)
Facility: HOSPITAL | Age: 40
LOS: 1 days | Discharge: HOME | End: 2025-04-01
Attending: STUDENT IN AN ORGANIZED HEALTH CARE EDUCATION/TRAINING PROGRAM | Admitting: STUDENT IN AN ORGANIZED HEALTH CARE EDUCATION/TRAINING PROGRAM
Payer: MEDICARE

## 2025-04-01 VITALS
DIASTOLIC BLOOD PRESSURE: 71 MMHG | TEMPERATURE: 98 F | RESPIRATION RATE: 17 BRPM | HEART RATE: 100 BPM | OXYGEN SATURATION: 100 % | SYSTOLIC BLOOD PRESSURE: 111 MMHG | HEIGHT: 68 IN

## 2025-04-01 DIAGNOSIS — Z90.49 ACQUIRED ABSENCE OF OTHER SPECIFIED PARTS OF DIGESTIVE TRACT: Chronic | ICD-10-CM

## 2025-04-01 LAB
ALBUMIN SERPL ELPH-MCNC: 3.9 G/DL — SIGNIFICANT CHANGE UP (ref 3.3–5)
ALP SERPL-CCNC: 59 U/L — SIGNIFICANT CHANGE UP (ref 40–120)
ALT FLD-CCNC: 37 U/L — HIGH (ref 4–33)
ANION GAP SERPL CALC-SCNC: 13 MMOL/L — SIGNIFICANT CHANGE UP (ref 7–14)
BASOPHILS # BLD AUTO: 0.02 K/UL — SIGNIFICANT CHANGE UP (ref 0–0.2)
BASOPHILS NFR BLD AUTO: 0.4 % — SIGNIFICANT CHANGE UP (ref 0–2)
BILIRUB SERPL-MCNC: 0.2 MG/DL — SIGNIFICANT CHANGE UP (ref 0.2–1.2)
BUN SERPL-MCNC: 10 MG/DL — SIGNIFICANT CHANGE UP (ref 7–23)
CALCIUM SERPL-MCNC: 9.4 MG/DL — SIGNIFICANT CHANGE UP (ref 8.4–10.5)
CHLORIDE SERPL-SCNC: 105 MMOL/L — SIGNIFICANT CHANGE UP (ref 98–107)
CREAT SERPL-MCNC: 0.78 MG/DL — SIGNIFICANT CHANGE UP (ref 0.5–1.3)
EGFR: 100 ML/MIN/1.73M2 — SIGNIFICANT CHANGE UP
EOSINOPHIL # BLD AUTO: 0.05 K/UL — SIGNIFICANT CHANGE UP (ref 0–0.5)
EOSINOPHIL NFR BLD AUTO: 0.9 % — SIGNIFICANT CHANGE UP (ref 0–6)
FLUAV AG NPH QL: SIGNIFICANT CHANGE UP
FLUBV AG NPH QL: SIGNIFICANT CHANGE UP
GLUCOSE SERPL-MCNC: 96 MG/DL — SIGNIFICANT CHANGE UP (ref 70–99)
HCT VFR BLD CALC: 32.1 % — LOW (ref 34.5–45)
HGB BLD-MCNC: 10.4 G/DL — LOW (ref 11.5–15.5)
IANC: 2.83 K/UL — SIGNIFICANT CHANGE UP (ref 1.8–7.4)
IMM GRANULOCYTES NFR BLD AUTO: 0.5 % — SIGNIFICANT CHANGE UP (ref 0–0.9)
LYMPHOCYTES # BLD AUTO: 2.03 K/UL — SIGNIFICANT CHANGE UP (ref 1–3.3)
LYMPHOCYTES # BLD AUTO: 36 % — SIGNIFICANT CHANGE UP (ref 13–44)
MAGNESIUM SERPL-MCNC: 1.8 MG/DL — SIGNIFICANT CHANGE UP (ref 1.6–2.6)
MCHC RBC-ENTMCNC: 30 PG — SIGNIFICANT CHANGE UP (ref 27–34)
MCV RBC AUTO: 92.5 FL — SIGNIFICANT CHANGE UP (ref 80–100)
MONOCYTES # BLD AUTO: 0.68 K/UL — SIGNIFICANT CHANGE UP (ref 0–0.9)
MONOCYTES NFR BLD AUTO: 12.1 % — SIGNIFICANT CHANGE UP (ref 2–14)
NEUTROPHILS # BLD AUTO: 2.83 K/UL — SIGNIFICANT CHANGE UP (ref 1.8–7.4)
NEUTROPHILS NFR BLD AUTO: 50.1 % — SIGNIFICANT CHANGE UP (ref 43–77)
NRBC # BLD AUTO: 0 K/UL — SIGNIFICANT CHANGE UP (ref 0–0)
NRBC # FLD: 0 K/UL — SIGNIFICANT CHANGE UP (ref 0–0)
PHOSPHATE SERPL-MCNC: 3.4 MG/DL — SIGNIFICANT CHANGE UP (ref 2.5–4.5)
PLATELET # BLD AUTO: 135 K/UL — LOW (ref 150–400)
POTASSIUM SERPL-MCNC: 4.9 MMOL/L — SIGNIFICANT CHANGE UP (ref 3.5–5.3)
POTASSIUM SERPL-SCNC: 4.9 MMOL/L — SIGNIFICANT CHANGE UP (ref 3.5–5.3)
PROT SERPL-MCNC: 6.9 G/DL — SIGNIFICANT CHANGE UP (ref 6–8.3)
RBC # BLD: 3.47 M/UL — LOW (ref 3.8–5.2)
RBC # FLD: 15.2 % — HIGH (ref 10.3–14.5)
RSV RNA NPH QL NAA+NON-PROBE: SIGNIFICANT CHANGE UP
SARS-COV-2 RNA SPEC QL NAA+PROBE: SIGNIFICANT CHANGE UP
SODIUM SERPL-SCNC: 142 MMOL/L — SIGNIFICANT CHANGE UP (ref 135–145)
SOURCE RESPIRATORY: SIGNIFICANT CHANGE UP
WBC # BLD: 5.64 K/UL — SIGNIFICANT CHANGE UP (ref 3.8–10.5)
WBC # FLD AUTO: 5.64 K/UL — SIGNIFICANT CHANGE UP (ref 3.8–10.5)

## 2025-04-01 PROCEDURE — 36410 VNPNXR 3YR/> PHY/QHP DX/THER: CPT

## 2025-04-01 PROCEDURE — 99285 EMERGENCY DEPT VISIT HI MDM: CPT

## 2025-04-01 RX ORDER — OXYCODONE HYDROCHLORIDE 30 MG/1
5 TABLET ORAL ONCE
Refills: 0 | Status: DISCONTINUED | OUTPATIENT
Start: 2025-04-01 | End: 2025-04-01

## 2025-04-01 RX ORDER — DIPHENHYDRAMINE HCL 12.5MG/5ML
50 ELIXIR ORAL ONCE
Refills: 0 | Status: COMPLETED | OUTPATIENT
Start: 2025-04-01 | End: 2025-04-01

## 2025-04-01 RX ORDER — ACETAMINOPHEN 500 MG/5ML
1000 LIQUID (ML) ORAL ONCE
Refills: 0 | Status: COMPLETED | OUTPATIENT
Start: 2025-04-01 | End: 2025-04-01

## 2025-04-01 RX ORDER — GABAPENTIN 400 MG/1
300 CAPSULE ORAL ONCE
Refills: 0 | Status: COMPLETED | OUTPATIENT
Start: 2025-04-01 | End: 2025-04-01

## 2025-04-01 RX ADMIN — GABAPENTIN 300 MILLIGRAM(S): 400 CAPSULE ORAL at 21:49

## 2025-04-01 RX ADMIN — Medication 50 MILLIGRAM(S): at 21:49

## 2025-04-01 RX ADMIN — Medication 1000 MILLILITER(S): at 21:49

## 2025-04-01 NOTE — ED PROVIDER NOTE - CLINICAL SUMMARY MEDICAL DECISION MAKING FREE TEXT BOX
(Kalpana García MD-PGY1): 39-year-old female with past medical history obesity, factor V Leiden deficiency complicated by DVT/PE (on Eliquis), bipolar disorder, hypothyroidism, psychogenic nonepileptic seizures, IBS, GERD, pelvic inflammatory disease, endometriosis, asthma, personality disorder, mild intellectual disability, CVA, frequent ED visits for abdominal pain presents with abdominal pain. She was seen on 3/30 for similar pain and declined CT/labs at that time given she was feeling improved. Currently states her abdominal pain has been ongoing for a week and is located primarily on the lower left side, though endorsing bilateral pain. States L sided pain radiates to groin region. She has had hernias in the past, but monitoring. Denies abnormal discharge or vaginal concerns. BMs with diarrhea lately. Also complains of full body pain, cough, congestion, and concern for COVID that is new since developing abdominal pain. No fevers, nausea, vomiting. No other concerns at this time.     Vitals on arrival notable for , but otherwise normal. Physical--nontoxic, appears fatigued, rrr, clear lungs, abd mildly tender in LLQ more than RLQ and some suprapubic tenderness.  exam partially performed per patient preference with RN chaperone: visual inspection of groin region without abnormality, rashes, lesions, masses. Palpated the groin bilaterally and no gross abnormalities though pt endorsing mild pain throughout exam, though nonfocal. Declined pelvic and speculum exam. DDx-- broad: infection: covid/viral, colitis, hernia, UTI, MSK/nerve pain. Pt was to get CTAP last visit and would like this now. Will plan for labs, ctap, flu swab, urine, upreg, cxr, ekg. Pt amenable. Updates in progress note section. Will give pt home medications per NH paperwork: oxy 5mg, gabapentin 300mg, diphenhydramine 50mg.

## 2025-04-01 NOTE — ED ADULT NURSE NOTE - NSFALLUNIVINTERV_ED_ALL_ED
Monitor gait and stability/Bed/Stretcher in lowest position, wheels locked, appropriate side rails in place/Call bell, personal items and telephone in reach/Instruct patient to call for assistance before getting out of bed/chair/stretcher/Non-slip footwear applied when patient is off stretcher/[place] to call system/Physically safe environment - no spills, clutter or unnecessary equipment/Purposeful proactive rounding/Room/bathroom lighting operational, light cord in reach

## 2025-04-01 NOTE — ED ADULT NURSE NOTE - DISCHARGE DATE/TIME
PFT shows moderate obstruction with no change to bronchodilator, no restriction, severe diffusion defect. PFT has improved compared to previous.     6 minute walk is hypoxemic needs to be on 2 liters.   
02-Apr-2025 05:56

## 2025-04-01 NOTE — ED PROVIDER NOTE - PROGRESS NOTE DETAILS
(Kalpana García MD-PGY1): Pt needs USIV, placed without issues. MD Montes: Pt signed out to me pending CTr. CT negative for acute pathology (incidental finding of hepatic steatosis). Labs/UA non-actionable. Pt was re-evaluated at bedside, VSS, feeling better overall. Results were discussed with patient as well as return precautions and follow up plan with PCP. Time was taken to answer any questions that the patient had before providing them with discharge paperwork.

## 2025-04-01 NOTE — ED ADULT TRIAGE NOTE - CHIEF COMPLAINT QUOTE
Pt arrives from Nursing home c/o lower abdominal and pelvic pain that started this morning with generalized weakness. Breathing even and unlabored. No acute distress noted at this time. calm and cooperative at this time.  Hx HTN, HLD, bipolar, CVA, schizophrenia

## 2025-04-01 NOTE — ED PROVIDER NOTE - ATTENDING CONTRIBUTION TO CARE
39-year-old female past medical history factor V Leiden complicated by VTE on DOAC, bipolar, hypothyroidism, PNES, IBS, GERD, endometriosis, asthma, CVA, mild clinical stability, elevated BMI presents for abdominal pain.  Patient reports diffuse abdominal pain however notes located more so in the left lower abdomen.  Patient denies dysuria, hematuria, vaginal bleeding or discharge.  Patient reports loose but not watery bowel movements recently.  Pain has been ongoing for at least 1 week.  Patient denies nausea, vomiting, fevers, chills, chest pain, shortness of breath.  Denies report cough myalgia and nasal congestion.    Exam as above, no acute distress, NCAT, PERRL, EOMI, MMM.  S1-S2 regular rhythm.  Lungs clear to auscultation bilaterally.  Abdomen soft, nondistended with scattered tenderness without rebound or guarding.  No abdominal, inguinal, femoral hernia palpated.  Patient awake alert and oriented x 3, sensation and motor intact in all 4 extremities.  No midline or paraspinal tenderness.  No CVA tenderness.    Broad differential diagnosis.  Of note patient does report chronic abdominal pain however this past week has been more intense.  Plan: Labs, symptom relief as needed, CT, reassess.    Patient signed out at end of my shift.

## 2025-04-01 NOTE — ED ADULT NURSE REASSESSMENT NOTE - NS ED NURSE REASSESS COMMENT FT1
Pt awake and alert, A&OX4, resp even and unlabored. Pt in no acute distress. US IV placed by MD Mann.  Denies CP, SOB, HA, dizziness, palpitations, blurry vision. Resting comfortably. Safety being maintined, plan of care ongoing.

## 2025-04-01 NOTE — ED ADULT NURSE NOTE - OBJECTIVE STATEMENT
BREAK RN: Pt received at handoff report in room 14. Pt A&Ox3, currently residing in a rehab facility for PT due to difficulty ambulating. Pt presenting to the ED complaining of generalized fatigue and LLQ and RLQ pain radiating to the groin starting this morning. Pt denies c/p, SOB, headache, blurry vision, n/v/d, or fever like symptoms. Labs drawn and sent. Pt pending US guided IV access, MD García aware. Respirations even and unlabored, NAD noted. Comfort measures provided, safety maintained. Report to be given to primary RN Sussy.

## 2025-04-01 NOTE — ED ADULT NURSE NOTE - PATIENT'S PREFERRED PRONOUN
Visit Summary for Savanna Turner - Gender: Male - Date of Birth: [id number]  Date: [id number] - Duration: 6 minutes  Patient: Savanna Turner  Provider: Jourdan BAUER    Patient Contact Information  Address  [address] [facility] [address]  [phone]    Visit Topics  Flu-Like Symptoms [Added By: Self - 2022-12-15]    Triage Questions   What is your current physical address in the event of a medical emergency? Answer []  Are you allergic to any medications? Answer []  Are you now or could you be pregnant? Answer []  Do you have any immune system compromise or chronic lung   disease? Answer []  Do you have any vulnerable family members in the home (infant, pregnant, cancer, elderly)? Answer []     Conversation Transcripts  [0A][0A] [Notification] You are connected with Ganesh Ugalde, Urgent Care Specialist [0A][Notification] Jaron Chong is located in [facility]  [0A][Notification] Jaron Chong has shared health history  Bharat Galvez  [0A]    Diagnosis  Acute pansinusitis    Procedures  Value: [id number] Code: CPT-4 UNLISTED E&M SERVICE    Medications Prescribed    No prescriptions ordered    Electronically signed by: Ganesh López(NPI [id number])
Her/She

## 2025-04-01 NOTE — ED PROVIDER NOTE - PATIENT PORTAL LINK FT
You can access the FollowMyHealth Patient Portal offered by [facility] by registering at the following website: [email or web address]. By joining [facility]’s FollowMyHealth portal, you will also be able to view your health information using other applications (apps) compatible with our system.

## 2025-04-02 VITALS
SYSTOLIC BLOOD PRESSURE: 113 MMHG | OXYGEN SATURATION: 100 % | HEART RATE: 100 BPM | RESPIRATION RATE: 18 BRPM | TEMPERATURE: 98 F | DIASTOLIC BLOOD PRESSURE: 67 MMHG

## 2025-04-02 LAB
APPEARANCE UR: CLEAR — SIGNIFICANT CHANGE UP
BACTERIA # UR AUTO: ABNORMAL /HPF
BILIRUB UR-MCNC: NEGATIVE — SIGNIFICANT CHANGE UP
CAST: 0 /LPF — SIGNIFICANT CHANGE UP (ref 0–4)
COLOR SPEC: SIGNIFICANT CHANGE UP
KETONES UR-MCNC: 15 MG/DL
LEUKOCYTE ESTERASE UR-ACNC: NEGATIVE — SIGNIFICANT CHANGE UP
NITRITE UR-MCNC: NEGATIVE — SIGNIFICANT CHANGE UP
PH UR: 5.5 — SIGNIFICANT CHANGE UP (ref 5–8)
PROT UR-MCNC: 30 MG/DL
RBC CASTS # UR COMP ASSIST: 4 /HPF — SIGNIFICANT CHANGE UP (ref 0–4)
REVIEW: SIGNIFICANT CHANGE UP
SP GR SPEC: 1.04 — HIGH (ref 1–1.03)
SQUAMOUS # UR AUTO: 13 /HPF — HIGH (ref 0–5)
UROBILINOGEN FLD QL: 1 MG/DL — SIGNIFICANT CHANGE UP (ref 0.2–1)
WBC UR QL: 1 /HPF — SIGNIFICANT CHANGE UP (ref 0–5)

## 2025-04-02 PROCEDURE — 71046 X-RAY EXAM CHEST 2 VIEWS: CPT | Mod: 26

## 2025-04-02 PROCEDURE — 93010 ELECTROCARDIOGRAM REPORT: CPT

## 2025-04-02 PROCEDURE — 74177 CT ABD & PELVIS W/CONTRAST: CPT | Mod: 26

## 2025-04-02 RX ORDER — QUETIAPINE FUMARATE 25 MG/1
200 TABLET ORAL ONCE
Refills: 0 | Status: COMPLETED | OUTPATIENT
Start: 2025-04-02 | End: 2025-04-02

## 2025-04-02 RX ORDER — CYCLOBENZAPRINE HYDROCHLORIDE 15 MG/1
5 CAPSULE, EXTENDED RELEASE ORAL ONCE
Refills: 0 | Status: ACTIVE | OUTPATIENT
Start: 2025-04-02

## 2025-04-02 RX ORDER — CYCLOBENZAPRINE HYDROCHLORIDE 15 MG/1
5 CAPSULE, EXTENDED RELEASE ORAL ONCE
Refills: 0 | Status: COMPLETED | OUTPATIENT
Start: 2025-04-02 | End: 2025-04-02

## 2025-04-02 RX ADMIN — QUETIAPINE FUMARATE 200 MILLIGRAM(S): 25 TABLET ORAL at 04:44

## 2025-04-02 RX ADMIN — CYCLOBENZAPRINE HYDROCHLORIDE 5 MILLIGRAM(S): 15 CAPSULE, EXTENDED RELEASE ORAL at 04:44

## 2025-04-02 NOTE — PROVIDER CONTACT NOTE (OTHER) - SITUATION
[facility] set up non-emergent Ambulance back to [facility] [phone]. Spoke to EMS José. ~60-90 minute . spoke to Supervisor Nilwood regarding pt transfer back to NH

## 2025-04-03 LAB
CULTURE RESULTS: SIGNIFICANT CHANGE UP
SPECIMEN SOURCE: SIGNIFICANT CHANGE UP

## 2025-04-07 ENCOUNTER — APPOINTMENT (OUTPATIENT)
Dept: OPHTHALMOLOGY | Facility: CLINIC | Age: 40
End: 2025-04-07

## 2025-04-09 NOTE — DISCHARGE NOTE PROVIDER - DISCHARGE DATE
04-Apr-2022 [Fever] : no fever [Headache] : no headache [Weight Gain (___ Lbs)] : no recent weight gain [Chills] : no chills [Feeling Fatigued] : not feeling fatigued [Weight Loss (___ Lbs)] : no recent weight loss [Blurry Vision] : no blurred vision [Sore Throat] : no sore throat [SOB] : no shortness of breath 07-Apr-2022 [Dyspnea on exertion] : not dyspnea during exertion [Chest Discomfort] : no chest discomfort [Lower Ext Edema] : no extremity edema [Palpitations] : no palpitations [Orthopnea] : no orthopnea [Syncope] : no syncope [Nausea] : no nausea [Vomiting] : no vomiting [Joint Pain] : no joint pain [Dizziness] : no dizziness [Confusion] : no confusion was observed [Easy Bleeding] : no tendency for easy bleeding [Easy Bruising] : no tendency for easy bruising

## 2025-04-09 NOTE — H&P ADULT - PROBLEM/PLAN-2
Outpatient Rehab    Pediatric Speech-Language Pathology Visit    Patient Name: Reji Baer  MRN: [id number]  YOB: 2016  Encounter Date: 4/9/2025    Therapy Diagnosis:   Encounter Diagnosis   Name Primary?    Speech delay Yes     Physician: Ajay Guadarrama MD    Physician Orders: Eval and Treat  Medical Diagnosis: Speech delay    Visit # / Visits Authorized: 15 / 24   Insurance Authorization Period: 2/12/2025 to 5/15/2025  Date of Evaluation: 3/7/22   Plan of Care Certification: 1/30/25 to 4/24/25     Time In: 1330   Time Out: 1400  Total Time: 30   Total Billable Time: 30         Subjective   Reji transitioned to speech therapy with ease. He required moderate to maximal phonetic, visual and tactile prompts to remain on task during his 30 minute appointment..  Pain reported as 0/10.    Family/caregiver present for this visit:       Objective            Treatment  Speech  Activity 1: Review of transitions between /k,t,g,d/.  Activity 2: Production of /ch/ targets with initial position in phrase level during Easter egg hunt.  Activity 3: Production practice of target phonemes in isolation with review of placement and manner.    Time Entry(in minutes):  Speech Treatment (Individual) Time Entry: 30    Assessment & Plan   Assessment  Reji did well with review of target sounds today, requiring minimal to moderate support to correct. More difficulty with /ch/ as often kept lower jaw closed. SLP targeted production of /ch/ in initial position at word level since more difficult. Also expanded to phrase level to work on transitions between phonemes already acquired. He required moderate to maximal support at times with 70% accuracy noted. Continued need for opening jaw upon production as keeping jaw closed made production more /s/ like or /sh/ like in nature. He produced some phrases well with slower transition time and had closer approximations of production when focusing on motor plans. He also was noted  to actively change motor plan for /l/ during practice.       Patient will continue to benefit from skilled outpatient speech therapy to address the deficits listed in the problem list box on initial evaluation, provide pt/family education and to maximize pt's level of independence in the home and community environment.     Patient's spiritual, cultural, and educational needs considered and patient agreeable to plan of care and goals.     Education  Education was done with Other recipient present and Patient. The patient's learning style includes Listening and Demonstration. The patient Verbalizes understanding, Demonstrates understanding, and Requires continuing/additional education. Mother participated in education. They identified as Parent. The reported learning style is Listening. The recipient Verbalizes understanding.              Plan  Continue speech and language therapy 2/wk for 30 minutes for 12 weeks as planned. Continue implementation of a home program to facilitate carryover of targeted speech and langauge skills.          Goals:   Active       Articulation        Produce /l/ in CV and initial word position at word level.  (Progressing)       Start:  01/29/25    Expected End:  04/23/25       In 3 out of 5 opportunities given moderate support.     1/29/25 - goal met with using /l/ productions at CV level given moderate support.          Produce /sh/ and /ch/ correctly in initial word position at word level.  (Progressing)       Start:  01/29/25    Expected End:  04/23/25       In 3 out of 5 opportunities given minimal support.     1/29/25 - met goal of lip rounding for CV words in 4 out of 5 opportunities.             Motor Speech       Reji will improve transitions between posterior and anterior productions (I.e. /k/ and /t/). (Progressing)       Start:  01/29/25    Expected End:  04/23/25       Within 3 out of 5 opportunities at word level given minimal support.     1/29/25 - met goal at moderate  support.          Improve articulatory accuracy to include more bisyllabic words (more varied syllabic shapes). (Progressing)       Start:  01/29/25    Expected End:  04/23/25       By producing 3 out of 5 targets given minimal support.     1/29/25- goal met for different vowels within one consonant bisyllabic productions independently          Repair breakdowns in connected speech by using strategies of writing, AAC or drawing to communicate.  (Progressing)       Start:  01/29/25    Expected End:  04/23/25       In 4 out of 5 opportunities given minimal support.     1/29/25- progressing to this point as he has done 2-3 opportunities with complete independence.              Abigail Torres, CCC-SLP     DISPLAY PLAN FREE TEXT

## 2025-04-15 NOTE — ED PROVIDER NOTE - COVID-19  TEST TYPE
Suzanne Flores   1968   [id number]       04/15/25    Reason for Visit:    Chief Complaint   Patient presents with    Office Visit   Annual routine follow up for remote history of DCIS    History of Present Illness:   Suzanne Flores is a 56 year-old female with a remote history of right DCIS.    Mrs. Flores had a mammogram in April 2012 that was abnormal.  This led to a six-month followup exam.  The patient’s followup mammogram was abnormal 10/2012 and she underwent a stereotactic biopsy.  There was a right breast mass at the 10 o’clock position, 3 cm from the nipple.  The findings demonstrated grade III ductal carcinoma in situ (DCIS), solid comedo types in specimens one and two and a benign breast tissue with nodular stromal fibrosis in specimen three.  She had right breast lumpectomy on 11/20/12 by Dr. Ordaz which demonstrated DCIS solid and comedo type with eight negative axillary nodes.  The tumor measured 3.5 mm in greatest dimension.  Margins were all clear.  The tumor was ER/MD positive and HER-2/es non-amplified.  The patient completed her course of radiation therapy.  She had BRCA testing which was negative.       After discussions with Dr. Fabian, she opted not to take tamoxifen chemoprevention.      In early 2017, she had breast imaging that showed left fibroadenoma had grown by 30%.  She opted for excision which she had with Dr. Ordaz and pathology returned back as fibroadenoma.      She has been on annual surveillance.    Since I last saw her in early 2023, she was admitted on 8/8/23 after presenting to the ED with acute cholecystitis. She had a laparoscopic cholecystectomy on 8/9/23.  She presented to the ED on 2/25/24 with left flank pain and was found to have ureterolithiasis. She has since followed up with urology.  She had left ureteral stent placement for distal ureteral calculus in 4/2024.    Patient is here for a routine annual follow up on 4/15/2025.  She has been doing well.   No new medical issues.  No recent problems with kidney stones.  No breast masses.    Past Medical History  No past medical history on file.   PMH, FH, SH are all unchanged since first documented by Dr. Fabian on 2/25/13 except where noted above.    Surgical History  No past surgical history on file.    Hormonal History    Social History  Social History     Tobacco Use    Smoking status: Never     Passive exposure: Never    Smokeless tobacco: Never   Substance Use Topics    Alcohol use: Not Currently    Drug use: Never       Family History  No family history on file.  PMH, FH, SH are all unchanged since first documented by Dr. Fabian on 2/25/13 except where noted above.  Father passed away from ampullary cancer in 2014.  Her mother was diagnosed in late 2015 with stage I breast cancer at the age of 75.      Allergies  ALLERGIES:  Patient has no known allergies.    Medications  Current Outpatient Medications   Medication Sig    EPINEPHrine 0.3 MG/0.3ML auto-injector Inject 0.3 mg into the muscle.    tamsulosin (FLOMAX) 0.4 MG Cap Take 1 capsule by mouth daily.    diphenhydrAMINE (SOMINEX) 25 MG tablet Take 1 tablet by mouth.    fluoxetine (PROzac) 40 MG capsule Take 40 mg by mouth daily.    levothyroxine (Synthroid) 75 MCG tablet levothyroxine sodium 0.075 MG Oral Tablet [Synthroid]        Active    montelukast (SINGULAIR) 10 MG tablet Take 10 mg by mouth daily.    escitalopram (LEXAPRO) 10 MG tablet Take 10 mg by mouth.    famotidine (PEPCID) 20 MG tablet famotidine 20 MG Oral Tablet        Active    fexofenadine (ALLEGRA) 180 MG tablet Take 180 mg by mouth daily.     No current facility-administered medications for this visit.         Review of Systems:  As per HPI      /73   Pulse 68   Temp 98.6 °F (37 °C)   Resp 18   Ht 5' 5.3\" (1.659 m)   Wt 114.7 kg (252 lb 13.9 oz)   BMI 41.69 kg/m²   BSA 2.19 m²    ECOG [04/15/25 1413]   ECOG Performance Status 0         Physical Exam:  GENERAL: WDWN female,  NAD, A+Ox4  HEENT: normocephalic, sclera anicteric, conjunctiva/lids normal, oropharynx: clear  NECK: supple, no cervical lymphadenopathy, full ROM, no thryomegaly  CHEST: Breathing comfortably, no retractions.  CARDIOVASCULAR: Regular rate, no bilateral lower extremity edema  ABDOMEN: Soft, nontender, nondistended,   BREAST exam: Right breast: Well-healed incision, no abnormal chest wall or breast masses.  No axillary lymphadenopathy.  Left breast: No abnormal chest wall or breast masses.  No axillary lymphadenopathy.  EXTREMITIES: No cyanosis or clubbing.  No bilateral lower extremity edema  NEUROLOGIC: Cranial nerves II through XII are grossly intact, normal affect, normal gait  SKIN: No obvious rashes or lesions on exposed skin, dry, intact  LYMPH NODES: No cervical, axillary lymphadenopathy.  PSYCHIATRIC: Normal affect, normal mood, normal judgment, normal behavior,normal speech    No results for input(s): \"WBC\", \"RBC\", \"HGB\", \"HCT\", \"MCV\", \"PLT\", \"ANEUT\", \"ALYMS\", \"JOSEPH\", \"AEOS\", \"ABASO\" in the last 8765 hours.  No results for input(s): \"GLUCOSE\", \"SODIUM\", \"POTASSIUM\", \"CHLORIDE\", \"CO2\", \"BUN\", \"CREATININE\", \"CALCIUM\", \"MG\", \"TOTPROTEIN\", \"ALBUMIN\", \"AST\", \"ALKPT\", \"GPT\" in the last 8765 hours.  No results for input(s): \"ANIONGAP\", \"GLOB\", \"LDH\", \"BILIRUBIN\" in the last 8765 hours.    No results found for any previous visit.        Imaging studies were reviewed with the following pertinent positives:     Bilateral screening mammogram on 10/2/2024 and ultrasound on 10/28/2024 presented no evidence of malignancy.    ASSESSMENT AND PLAN:  Suzanne Flores is a 56 year old female with a history of right stage 0 DCIS.      1.  Remote history of DCIS.  She is status post right lumpectomy 11/2012 and radiation.  She opted not to proceed with tamoxifen chemoprevention.  She had an abnormal mammogram 12/2015 and biopsy showed fibroadenoma.  Repeat imaging 2016 showed increase in fibroadenoma and she had it excised Feb  2017 - pathology showed fibroadenoma.      She has been on annual surveillance.  Since she is more than 10 years out from her diagnosis, she could be followed by her PCP but she prefers to see me annually.      Most recent mammogram on 10/2/24 was negative for any suspicious abnormalities. Breast US for dense breasts on 10/28/24 did not show any suspicious abnormalities. She has these done at [facility]. I ordered both mammogram and US today - due in October 2025.     Patient is asymptomatic.  Clinical breast exam was unremarkable today. She is doing well without evidence of disease.     I will see her back in 12 months for routine follow up. She knows to call with any questions or concerns.     2.)  Breast asymmetry-due to surgery on the right breast.  Discussed that patient could see plastic surgery for symmetry surgery in the past, but she had held off. She wants to lose weight first.      3.)  Genetics-patient had BRCA testing done when she was diagnosed in 2012.     4.) Monoclonal gammopathy- managed by Dr. Cash, her immunologist/allergist.     Prognosis: excellent          Treatment Intent: curative    Recent PHQ 2/9 Score    PHQ 2:  PHQ 2 Score Adult PHQ 2 Score Adult PHQ 2 Interpretation Little interest or pleasure in activity?   4/15/2025   1:55 PM 0 No further screening needed 0       PHQ 9:       Distress Score   Distress Management Group      Distress Scale (0-10)         The documentation recorded by the scribe accurately and completely reflects the service(s) I personally performed and the decisions made by me.     Carol Triplett M.D.    On 4/15/2025, ICherie scribed the services personally performed by Carol Triplett MD        MOLECULAR PCR

## 2025-04-21 NOTE — ED ADULT NURSE NOTE - NS PRO AD NO ADVANCE DIRECTIVE
No Extraction Method: 11 blade and comedo extractor Acne Type: Comedonal Lesions Post-Care Instructions: I reviewed with the patient in detail post-care instructions. Patient is to keep the treatment areas dry overnight, and then apply bacitracin twice daily until healed. Patient may apply hydrogen peroxide soaks to remove any crusting. Prep Text (Optional): Prior to removal the treatment areas were prepped in the usual fashion. Render Number Of Lesions Treated: no Consent was obtained and risks were reviewed including but not limited to scarring, infection, bleeding, scabbing, incomplete removal, and allergy to anesthesia. Detail Level: Detailed

## 2025-05-01 PROCEDURE — 76937 US GUIDE VASCULAR ACCESS: CPT | Mod: 26

## 2025-05-01 NOTE — PROGRESS NOTE ADULT - PROBLEM SELECTOR PLAN 5
warm
- c/w home meds (clonidine 0.1 qhs, Benadryl 50 qhs, Depakote 750 bid, Lexapro 10 qd, Seroquel 225 qhs, risperidone 1 qhs)
--c/w home meds (clonidine 0.1 qhs, Benadryl 50 qhs, Depakote 750 bid, Lexapro 10 qd, Seroquel 225 qhs, risperidone 1 qhs)
- c/w home meds (clonidine 0.1 qhs, Benadryl 50 qhs, Depakote 750 bid, Lexapro 10 qd, Seroquel 225 qhs, risperidone 1 qhs)
--c/w home meds (clonidine 0.1 qhs, Benadryl 50 qhs, Depakote 750 bid, Lexapro 10 qd, Seroquel 225 qhs, risperidone 1 qhs)
--c/w home meds (clonidine 0.1 qhs, Benadryl 50 qhs, Depakote 750 bid, Lexapro 10 qd, Seroquel 225 qhs, risperidone 1 qhs)

## 2025-05-03 ENCOUNTER — EMERGENCY (EMERGENCY)
Facility: HOSPITAL | Age: 40
LOS: 1 days | End: 2025-05-03
Attending: EMERGENCY MEDICINE | Admitting: EMERGENCY MEDICINE
Payer: MEDICARE

## 2025-05-03 VITALS
SYSTOLIC BLOOD PRESSURE: 117 MMHG | DIASTOLIC BLOOD PRESSURE: 83 MMHG | HEIGHT: 68 IN | TEMPERATURE: 98 F | OXYGEN SATURATION: 99 % | WEIGHT: 293 LBS | HEART RATE: 95 BPM | RESPIRATION RATE: 16 BRPM

## 2025-05-03 DIAGNOSIS — Z90.49 ACQUIRED ABSENCE OF OTHER SPECIFIED PARTS OF DIGESTIVE TRACT: Chronic | ICD-10-CM

## 2025-05-03 PROCEDURE — 99284 EMERGENCY DEPT VISIT MOD MDM: CPT

## 2025-05-03 PROCEDURE — 73562 X-RAY EXAM OF KNEE 3: CPT | Mod: 26,RT

## 2025-05-03 RX ORDER — ACETAMINOPHEN 500 MG/5ML
650 LIQUID (ML) ORAL ONCE
Refills: 0 | Status: COMPLETED | OUTPATIENT
Start: 2025-05-03 | End: 2025-05-03

## 2025-05-03 RX ORDER — QUETIAPINE FUMARATE 25 MG/1
200 TABLET ORAL ONCE
Refills: 0 | Status: COMPLETED | OUTPATIENT
Start: 2025-05-03 | End: 2025-05-03

## 2025-05-03 RX ORDER — METHOCARBAMOL 500 MG/1
750 TABLET, FILM COATED ORAL ONCE
Refills: 0 | Status: COMPLETED | OUTPATIENT
Start: 2025-05-03 | End: 2025-05-03

## 2025-05-03 RX ORDER — DIPHENHYDRAMINE HCL 12.5MG/5ML
50 ELIXIR ORAL ONCE
Refills: 0 | Status: COMPLETED | OUTPATIENT
Start: 2025-05-03 | End: 2025-05-03

## 2025-05-03 RX ORDER — APIXABAN 2.5 MG/1
5 TABLET, FILM COATED ORAL ONCE
Refills: 0 | Status: COMPLETED | OUTPATIENT
Start: 2025-05-03 | End: 2025-05-03

## 2025-05-03 RX ADMIN — Medication 50 MILLIGRAM(S): at 23:56

## 2025-05-03 RX ADMIN — QUETIAPINE FUMARATE 200 MILLIGRAM(S): 25 TABLET ORAL at 23:55

## 2025-05-03 RX ADMIN — APIXABAN 5 MILLIGRAM(S): 2.5 TABLET, FILM COATED ORAL at 23:56

## 2025-05-03 RX ADMIN — Medication 500 MILLIGRAM(S): at 23:55

## 2025-05-03 RX ADMIN — METHOCARBAMOL 750 MILLIGRAM(S): 500 TABLET, FILM COATED ORAL at 20:48

## 2025-05-03 NOTE — ED PROVIDER NOTE - CLINICAL SUMMARY MEDICAL DECISION MAKING FREE TEXT BOX
A/P 38 yo F With a complicated past medical history as described above who presents with acute right knee pain.  Exam noted for full range of motion, no crepitus, no joint effusion, no erythema, point tenderness over tibial tuberosity with no overlying soft tissue swelling.  Plan: X-ray, analgesia, reassess  Relevant EMR reviewed

## 2025-05-03 NOTE — ED ADULT NURSE NOTE - OBJECTIVE STATEMENT
pt received to room 18. A&Ox4, ambulatory at baseline. history of seizure, bipolar disorder, HTN, DM. pt arrives from group home for pain to R knee. states last night turned in the bed when twisted knee. pt is ambulatory but limping due to pain. no obvious deformities noted. pending MD pitts. denies other medical complaints. respirations even unlabored abdomen soft, pedal pulses 2+ bilaterally. safety maintained

## 2025-05-03 NOTE — ED PROVIDER NOTE - NSFOLLOWUPINSTRUCTIONS_ED_ALL_ED_FT
Leg pain    You presented to the Emergency Department with Leg Pain. This can be caused by a variety of conditions, some involving the muskuloskeletal system, involving strains of muscles, inflammation of tendons or fractures of bones. Some can involve clotting of venous or arterial blood sources.    It is important for you to follow up with your primary care doctore within the next week to reassess your pain as well as the above specialist within the next 2-3 days. Take motrin 600 mg every 8 hours for pain and to improve inflammation, taking no more than 4000 mg per day.    Please return for any new weakness, loss of sensation, paresthesias, inability to walk, or worsening of symptoms.       Return to ED for any new or worsening symptoms including but not limited to: development of chest pain, shortness of breath, fever, vomiting, focal numbness, weakness or tingling, any severe chest pain, headache, abdominal pain, back pain.  Follow-up with PCP in 2-3 days for reassessment. Leg pain    You presented to the Emergency Department with Leg Pain. This can be caused by a variety of conditions, some involving the muskuloskeletal system, involving strains of muscles, inflammation of tendons or fractures of bones. Some can involve clotting of venous or arterial blood sources.    It is important for you to follow up with your primary care doctor within the next week to reassess your pain as well as the above specialist within the next 2-3 days.     Please return for any new weakness, loss of sensation, paresthesias, inability to walk, or worsening of symptoms.       Return to ED for any new or worsening symptoms including but not limited to: development of chest pain, shortness of breath, fever, vomiting, focal numbness, weakness or tingling, any severe chest pain, headache, abdominal pain, back pain.  Follow-up with PCP in 2-3 days for reassessment.

## 2025-05-03 NOTE — ED PROVIDER NOTE - PHYSICAL EXAMINATION
Attending/Elissa: Well-appearing, NAD; PERRL/EOMI, non-icterus, supple, no CRISTAL, no JVD, RRR, CTAB; Abd-soft, NT/ND, no HSM; no LE edema, Rt knee-no eff, +FROM, no med/lat PT, +tibial plateau PT, no STS, no overlying erythema; A&Ox3, nonfocal; Skin-warm/dry

## 2025-05-03 NOTE — ED ADULT NURSE REASSESSMENT NOTE - NS ED NURSE REASSESS COMMENT FT1
Pt received from previous RN Yomaira resting in bed Respirations even and unlabored. No signs of acute distress noted. Pt medicated per provider order. Will continue to monitor.

## 2025-05-03 NOTE — ED ADULT TRIAGE NOTE - CHIEF COMPLAINT QUOTE
Pt presents from Group Home for pain/injury to rt knee since last night, pt believes to have injured self when turning in bed last night, denies any known injury or falls. Pt having difficulty ambulating related to pain. No obvious deformity observed.

## 2025-05-03 NOTE — ED PROVIDER NOTE - PATIENT PORTAL LINK FT
You can access the FollowMyHealth Patient Portal offered by [facility] by registering at the following website: [email or web address]. By joining [employer]’s FollowMyHealth portal, you will also be able to view your health information using other applications (apps) compatible with our system.

## 2025-05-03 NOTE — ED PROVIDER NOTE - OBJECTIVE STATEMENT
Attending/Elissa: 38 yo F h/o obesity, factor V Leiden deficiency complicated by DVT/PE (on Eliquis), bipolar disorder, hypothyroidism, psychogenic nonepileptic seizures, IBS, GERD, pelvic inflammatory disease, endometriosis, asthma, personality disorder, mild intellectual disability, CVA, frequent ED visits for abdominal pain Attending/Elissa: 38 yo F h/o obesity, factor V Leiden deficiency complicated by DVT/PE (on Eliquis), bipolar disorder, schizoaffective d/o,  hypothyroidism, psychogenic nonepileptic seizures, IBS, GERD, pelvic inflammatory disease, endometriosis, asthma, personality disorder, mild intellectual disability, CVA p/w Rt knee pain. Patient states last night when propping her RLE up on her bed developed right knee pain. patient states she has been weight-bearing but it exacerbates the pain.

## 2025-05-04 VITALS
RESPIRATION RATE: 17 BRPM | OXYGEN SATURATION: 100 % | TEMPERATURE: 98 F | SYSTOLIC BLOOD PRESSURE: 117 MMHG | HEART RATE: 92 BPM | DIASTOLIC BLOOD PRESSURE: 78 MMHG

## 2025-05-04 RX ORDER — METHOCARBAMOL 500 MG/1
500 TABLET, FILM COATED ORAL ONCE
Refills: 0 | Status: COMPLETED | OUTPATIENT
Start: 2025-05-04 | End: 2025-05-04

## 2025-05-04 RX ORDER — METHOCARBAMOL 500 MG/1
2 TABLET, FILM COATED ORAL
Qty: 28 | Refills: 0
Start: 2025-05-04 | End: 2025-05-10

## 2025-05-04 RX ADMIN — METHOCARBAMOL 500 MILLIGRAM(S): 500 TABLET, FILM COATED ORAL at 01:21

## 2025-05-04 NOTE — PROVIDER CONTACT NOTE (OTHER) - RECOMMENDATIONS
Pt waiting over 2 hours for medicaid taxi. Writer called [employer] told would call back with ETA as carolina is down. No update x 45 minutes later and now no answer at [phone]. Writer therefore

## 2025-05-04 NOTE — PROVIDER CONTACT NOTE (OTHER) - SITUATION
Writer notified pt cleared for discharge. Pt from [facility] at [phone]. Phone number provided by pt. Writer contacted residence and spoke with staff member, Jonathon, who

## 2025-05-04 NOTE — PROVIDER CONTACT NOTE (OTHER) - ASSESSMENT
with invoice #[id number]. Verbal huddle occurred with interdisciplinary team. Pt to be notified upon taxi arrival.

## 2025-05-04 NOTE — PROVIDER CONTACT NOTE (OTHER) - BACKGROUND
was informed of pt's discharge. She reports that pt can travel INDEPENDENTLY via TAXI for discharge. care home address is [phone] [place] [address]. [employer] taxi arranged for pt with [employer] #[phone]

## 2025-05-08 ENCOUNTER — EMERGENCY (EMERGENCY)
Facility: HOSPITAL | Age: 40
LOS: 1 days | End: 2025-05-08
Attending: STUDENT IN AN ORGANIZED HEALTH CARE EDUCATION/TRAINING PROGRAM | Admitting: STUDENT IN AN ORGANIZED HEALTH CARE EDUCATION/TRAINING PROGRAM
Payer: MEDICARE

## 2025-05-08 ENCOUNTER — RESULT REVIEW (OUTPATIENT)
Age: 40
End: 2025-05-08

## 2025-05-08 VITALS
DIASTOLIC BLOOD PRESSURE: 73 MMHG | TEMPERATURE: 98 F | RESPIRATION RATE: 17 BRPM | SYSTOLIC BLOOD PRESSURE: 127 MMHG | OXYGEN SATURATION: 100 %

## 2025-05-08 VITALS
TEMPERATURE: 98 F | DIASTOLIC BLOOD PRESSURE: 86 MMHG | HEART RATE: 101 BPM | WEIGHT: 293 LBS | RESPIRATION RATE: 20 BRPM | OXYGEN SATURATION: 99 % | SYSTOLIC BLOOD PRESSURE: 121 MMHG | HEIGHT: 68 IN

## 2025-05-08 DIAGNOSIS — Z90.49 ACQUIRED ABSENCE OF OTHER SPECIFIED PARTS OF DIGESTIVE TRACT: Chronic | ICD-10-CM

## 2025-05-08 LAB
ALBUMIN SERPL ELPH-MCNC: 3.9 G/DL — SIGNIFICANT CHANGE UP (ref 3.3–5)
ALP SERPL-CCNC: 65 U/L — SIGNIFICANT CHANGE UP (ref 40–120)
ALT FLD-CCNC: 21 U/L — SIGNIFICANT CHANGE UP (ref 4–33)
ANION GAP SERPL CALC-SCNC: 15 MMOL/L — HIGH (ref 7–14)
AST SERPL-CCNC: 21 U/L — SIGNIFICANT CHANGE UP (ref 4–32)
BASOPHILS # BLD AUTO: 0.02 K/UL — SIGNIFICANT CHANGE UP (ref 0–0.2)
BASOPHILS NFR BLD AUTO: 0.4 % — SIGNIFICANT CHANGE UP (ref 0–2)
BILIRUB SERPL-MCNC: 0.3 MG/DL — SIGNIFICANT CHANGE UP (ref 0.2–1.2)
BUN SERPL-MCNC: 14 MG/DL — SIGNIFICANT CHANGE UP (ref 7–23)
CALCIUM SERPL-MCNC: 9 MG/DL — SIGNIFICANT CHANGE UP (ref 8.4–10.5)
CHLORIDE SERPL-SCNC: 104 MMOL/L — SIGNIFICANT CHANGE UP (ref 98–107)
CO2 SERPL-SCNC: 20 MMOL/L — LOW (ref 22–31)
CREAT SERPL-MCNC: 0.86 MG/DL — SIGNIFICANT CHANGE UP (ref 0.5–1.3)
EGFR: 89 ML/MIN/1.73M2 — SIGNIFICANT CHANGE UP
EGFR: 89 ML/MIN/1.73M2 — SIGNIFICANT CHANGE UP
EOSINOPHIL # BLD AUTO: 0.04 K/UL — SIGNIFICANT CHANGE UP (ref 0–0.5)
EOSINOPHIL NFR BLD AUTO: 0.9 % — SIGNIFICANT CHANGE UP (ref 0–6)
FLUAV AG NPH QL: SIGNIFICANT CHANGE UP
FLUBV AG NPH QL: SIGNIFICANT CHANGE UP
GLUCOSE SERPL-MCNC: 82 MG/DL — SIGNIFICANT CHANGE UP (ref 70–99)
HCG SERPL-ACNC: <1 MIU/ML — SIGNIFICANT CHANGE UP
HCT VFR BLD CALC: 32.6 % — LOW (ref 34.5–45)
HGB BLD-MCNC: 10.7 G/DL — LOW (ref 11.5–15.5)
IANC: 2.22 K/UL — SIGNIFICANT CHANGE UP (ref 1.8–7.4)
IMM GRANULOCYTES NFR BLD AUTO: 0.6 % — SIGNIFICANT CHANGE UP (ref 0–0.9)
LYMPHOCYTES # BLD AUTO: 1.77 K/UL — SIGNIFICANT CHANGE UP (ref 1–3.3)
LYMPHOCYTES # BLD AUTO: 37.7 % — SIGNIFICANT CHANGE UP (ref 13–44)
MCHC RBC-ENTMCNC: 30.6 PG — SIGNIFICANT CHANGE UP (ref 27–34)
MCHC RBC-ENTMCNC: 32.8 G/DL — SIGNIFICANT CHANGE UP (ref 32–36)
MCV RBC AUTO: 93.1 FL — SIGNIFICANT CHANGE UP (ref 80–100)
MONOCYTES # BLD AUTO: 0.61 K/UL — SIGNIFICANT CHANGE UP (ref 0–0.9)
MONOCYTES NFR BLD AUTO: 13 % — SIGNIFICANT CHANGE UP (ref 2–14)
NEUTROPHILS # BLD AUTO: 2.22 K/UL — SIGNIFICANT CHANGE UP (ref 1.8–7.4)
NEUTROPHILS NFR BLD AUTO: 47.4 % — SIGNIFICANT CHANGE UP (ref 43–77)
NRBC # BLD AUTO: 0.02 K/UL — HIGH (ref 0–0)
NRBC # FLD: 0.02 K/UL — HIGH (ref 0–0)
NRBC BLD AUTO-RTO: 0 /100 WBCS — SIGNIFICANT CHANGE UP (ref 0–0)
NT-PROBNP SERPL-SCNC: <36 PG/ML — SIGNIFICANT CHANGE UP
PLATELET # BLD AUTO: 129 K/UL — LOW (ref 150–400)
POTASSIUM SERPL-MCNC: 4.9 MMOL/L — SIGNIFICANT CHANGE UP (ref 3.5–5.3)
POTASSIUM SERPL-SCNC: 4.9 MMOL/L — SIGNIFICANT CHANGE UP (ref 3.5–5.3)
PROT SERPL-MCNC: 7 G/DL — SIGNIFICANT CHANGE UP (ref 6–8.3)
RBC # BLD: 3.5 M/UL — LOW (ref 3.8–5.2)
RBC # FLD: 16.2 % — HIGH (ref 10.3–14.5)
RSV RNA NPH QL NAA+NON-PROBE: SIGNIFICANT CHANGE UP
SARS-COV-2 RNA SPEC QL NAA+PROBE: SIGNIFICANT CHANGE UP
SODIUM SERPL-SCNC: 139 MMOL/L — SIGNIFICANT CHANGE UP (ref 135–145)
SOURCE RESPIRATORY: SIGNIFICANT CHANGE UP
TROPONIN T, HIGH SENSITIVITY RESULT: <6 NG/L — SIGNIFICANT CHANGE UP
WBC # BLD: 4.69 K/UL — SIGNIFICANT CHANGE UP (ref 3.8–10.5)
WBC # FLD AUTO: 4.69 K/UL — SIGNIFICANT CHANGE UP (ref 3.8–10.5)

## 2025-05-08 PROCEDURE — 70450 CT HEAD/BRAIN W/O DYE: CPT | Mod: 26

## 2025-05-08 PROCEDURE — 99285 EMERGENCY DEPT VISIT HI MDM: CPT | Mod: FS

## 2025-05-08 PROCEDURE — 93010 ELECTROCARDIOGRAM REPORT: CPT

## 2025-05-08 PROCEDURE — 93971 EXTREMITY STUDY: CPT | Mod: 26,RT

## 2025-05-08 PROCEDURE — 71275 CT ANGIOGRAPHY CHEST: CPT | Mod: 26

## 2025-05-08 PROCEDURE — 74177 CT ABD & PELVIS W/CONTRAST: CPT | Mod: 26

## 2025-05-08 PROCEDURE — 71046 X-RAY EXAM CHEST 2 VIEWS: CPT | Mod: 26

## 2025-05-08 PROCEDURE — 93970 EXTREMITY STUDY: CPT | Mod: 26

## 2025-05-08 RX ORDER — METHOCARBAMOL 500 MG/1
500 TABLET, FILM COATED ORAL ONCE
Refills: 0 | Status: COMPLETED | OUTPATIENT
Start: 2025-05-08 | End: 2025-05-08

## 2025-05-08 RX ORDER — ACETAMINOPHEN 500 MG/5ML
1000 LIQUID (ML) ORAL ONCE
Refills: 0 | Status: COMPLETED | OUTPATIENT
Start: 2025-05-08 | End: 2025-05-08

## 2025-05-08 RX ORDER — LORAZEPAM 4 MG/ML
0.5 VIAL (ML) INJECTION ONCE
Refills: 0 | Status: DISCONTINUED | OUTPATIENT
Start: 2025-05-08 | End: 2025-05-08

## 2025-05-08 RX ORDER — METOCLOPRAMIDE HCL 10 MG
10 TABLET ORAL ONCE
Refills: 0 | Status: COMPLETED | OUTPATIENT
Start: 2025-05-08 | End: 2025-05-08

## 2025-05-08 RX ORDER — DIPHENHYDRAMINE HCL 12.5MG/5ML
50 ELIXIR ORAL ONCE
Refills: 0 | Status: COMPLETED | OUTPATIENT
Start: 2025-05-08 | End: 2025-05-08

## 2025-05-08 RX ADMIN — Medication 10 MILLIGRAM(S): at 18:25

## 2025-05-08 RX ADMIN — METHOCARBAMOL 500 MILLIGRAM(S): 500 TABLET, FILM COATED ORAL at 19:03

## 2025-05-08 RX ADMIN — Medication 0.5 MILLIGRAM(S): at 20:49

## 2025-05-08 RX ADMIN — Medication 50 MILLIGRAM(S): at 20:06

## 2025-05-11 PROCEDURE — 76937 US GUIDE VASCULAR ACCESS: CPT | Mod: 26

## 2025-05-18 ENCOUNTER — EMERGENCY (EMERGENCY)
Facility: HOSPITAL | Age: 40
LOS: 1 days | End: 2025-05-18
Attending: EMERGENCY MEDICINE | Admitting: EMERGENCY MEDICINE
Payer: MEDICARE

## 2025-05-18 VITALS
OXYGEN SATURATION: 100 % | SYSTOLIC BLOOD PRESSURE: 133 MMHG | DIASTOLIC BLOOD PRESSURE: 83 MMHG | HEIGHT: 68 IN | HEART RATE: 101 BPM | RESPIRATION RATE: 16 BRPM | TEMPERATURE: 98 F

## 2025-05-18 DIAGNOSIS — Z90.49 ACQUIRED ABSENCE OF OTHER SPECIFIED PARTS OF DIGESTIVE TRACT: Chronic | ICD-10-CM

## 2025-05-18 PROCEDURE — 99284 EMERGENCY DEPT VISIT MOD MDM: CPT | Mod: FS

## 2025-05-18 NOTE — ED ADULT TRIAGE NOTE - CHIEF COMPLAINT QUOTE
pt recently seen at [facility] dx ovarian cyst c/o worsening pelvic pain now radiating to lower back with spotting. denies h/a, dizziness, cp, sob, abd pain, n/v, diarrhea, constipation, falls/trauma. hx endometriosis. well appearing in triage

## 2025-05-19 ENCOUNTER — EMERGENCY (EMERGENCY)
Facility: HOSPITAL | Age: 40
LOS: 1 days | End: 2025-05-19
Attending: STUDENT IN AN ORGANIZED HEALTH CARE EDUCATION/TRAINING PROGRAM | Admitting: STUDENT IN AN ORGANIZED HEALTH CARE EDUCATION/TRAINING PROGRAM
Payer: MEDICARE

## 2025-05-19 VITALS
HEART RATE: 95 BPM | WEIGHT: 199.96 LBS | RESPIRATION RATE: 18 BRPM | OXYGEN SATURATION: 100 % | HEIGHT: 68 IN | TEMPERATURE: 99 F | SYSTOLIC BLOOD PRESSURE: 121 MMHG | DIASTOLIC BLOOD PRESSURE: 87 MMHG

## 2025-05-19 VITALS
OXYGEN SATURATION: 98 % | RESPIRATION RATE: 16 BRPM | DIASTOLIC BLOOD PRESSURE: 90 MMHG | HEART RATE: 87 BPM | SYSTOLIC BLOOD PRESSURE: 140 MMHG | TEMPERATURE: 98 F

## 2025-05-19 VITALS
RESPIRATION RATE: 17 BRPM | OXYGEN SATURATION: 99 % | SYSTOLIC BLOOD PRESSURE: 113 MMHG | DIASTOLIC BLOOD PRESSURE: 80 MMHG | TEMPERATURE: 97 F | HEART RATE: 82 BPM

## 2025-05-19 DIAGNOSIS — Z90.49 ACQUIRED ABSENCE OF OTHER SPECIFIED PARTS OF DIGESTIVE TRACT: Chronic | ICD-10-CM

## 2025-05-19 LAB
ALBUMIN SERPL ELPH-MCNC: 4 G/DL — SIGNIFICANT CHANGE UP (ref 3.3–5)
ALBUMIN SERPL ELPH-MCNC: 4.4 G/DL — SIGNIFICANT CHANGE UP (ref 3.3–5)
ALP SERPL-CCNC: 75 U/L — SIGNIFICANT CHANGE UP (ref 40–120)
ALP SERPL-CCNC: 78 U/L — SIGNIFICANT CHANGE UP (ref 40–120)
ALT FLD-CCNC: 23 U/L — SIGNIFICANT CHANGE UP (ref 4–33)
ALT FLD-CCNC: 24 U/L — SIGNIFICANT CHANGE UP (ref 4–33)
ANION GAP SERPL CALC-SCNC: 11 MMOL/L — SIGNIFICANT CHANGE UP (ref 7–14)
ANION GAP SERPL CALC-SCNC: 16 MMOL/L — HIGH (ref 7–14)
APPEARANCE UR: CLEAR — SIGNIFICANT CHANGE UP
APPEARANCE UR: CLEAR — SIGNIFICANT CHANGE UP
APTT BLD: 39.8 SEC — HIGH (ref 26.1–36.8)
AST SERPL-CCNC: 14 U/L — SIGNIFICANT CHANGE UP (ref 4–32)
AST SERPL-CCNC: 18 U/L — SIGNIFICANT CHANGE UP (ref 4–32)
BACTERIA # UR AUTO: ABNORMAL /HPF
BACTERIA # UR AUTO: ABNORMAL /HPF
BASOPHILS # BLD AUTO: 0.01 K/UL — SIGNIFICANT CHANGE UP (ref 0–0.2)
BASOPHILS # BLD AUTO: 0.02 K/UL — SIGNIFICANT CHANGE UP (ref 0–0.2)
BASOPHILS NFR BLD AUTO: 0.2 % — SIGNIFICANT CHANGE UP (ref 0–2)
BASOPHILS NFR BLD AUTO: 0.4 % — SIGNIFICANT CHANGE UP (ref 0–2)
BILIRUB SERPL-MCNC: <0.2 MG/DL — SIGNIFICANT CHANGE UP (ref 0.2–1.2)
BILIRUB SERPL-MCNC: <0.2 MG/DL — SIGNIFICANT CHANGE UP (ref 0.2–1.2)
BILIRUB UR-MCNC: NEGATIVE — SIGNIFICANT CHANGE UP
BILIRUB UR-MCNC: NEGATIVE — SIGNIFICANT CHANGE UP
BLOOD GAS VENOUS COMPREHENSIVE RESULT: SIGNIFICANT CHANGE UP
BUN SERPL-MCNC: 14 MG/DL — SIGNIFICANT CHANGE UP (ref 7–23)
BUN SERPL-MCNC: 14 MG/DL — SIGNIFICANT CHANGE UP (ref 7–23)
CALCIUM SERPL-MCNC: 9.3 MG/DL — SIGNIFICANT CHANGE UP (ref 8.4–10.5)
CALCIUM SERPL-MCNC: 9.4 MG/DL — SIGNIFICANT CHANGE UP (ref 8.4–10.5)
CAST: 1 /LPF — SIGNIFICANT CHANGE UP (ref 0–4)
CAST: 3 /LPF — SIGNIFICANT CHANGE UP (ref 0–4)
CHLORIDE SERPL-SCNC: 104 MMOL/L — SIGNIFICANT CHANGE UP (ref 98–107)
CHLORIDE SERPL-SCNC: 106 MMOL/L — SIGNIFICANT CHANGE UP (ref 98–107)
CO2 SERPL-SCNC: 18 MMOL/L — LOW (ref 22–31)
CO2 SERPL-SCNC: 24 MMOL/L — SIGNIFICANT CHANGE UP (ref 22–31)
COLOR SPEC: YELLOW — SIGNIFICANT CHANGE UP
COLOR SPEC: YELLOW — SIGNIFICANT CHANGE UP
CREAT SERPL-MCNC: 0.72 MG/DL — SIGNIFICANT CHANGE UP (ref 0.5–1.3)
CREAT SERPL-MCNC: 0.85 MG/DL — SIGNIFICANT CHANGE UP (ref 0.5–1.3)
DIFF PNL FLD: NEGATIVE — SIGNIFICANT CHANGE UP
DIFF PNL FLD: NEGATIVE — SIGNIFICANT CHANGE UP
EGFR: 110 ML/MIN/1.73M2 — SIGNIFICANT CHANGE UP
EGFR: 110 ML/MIN/1.73M2 — SIGNIFICANT CHANGE UP
EGFR: 90 ML/MIN/1.73M2 — SIGNIFICANT CHANGE UP
EGFR: 90 ML/MIN/1.73M2 — SIGNIFICANT CHANGE UP
EOSINOPHIL # BLD AUTO: 0.01 K/UL — SIGNIFICANT CHANGE UP (ref 0–0.5)
EOSINOPHIL # BLD AUTO: 0.02 K/UL — SIGNIFICANT CHANGE UP (ref 0–0.5)
EOSINOPHIL NFR BLD AUTO: 0.2 % — SIGNIFICANT CHANGE UP (ref 0–6)
EOSINOPHIL NFR BLD AUTO: 0.4 % — SIGNIFICANT CHANGE UP (ref 0–6)
GLUCOSE SERPL-MCNC: 108 MG/DL — HIGH (ref 70–99)
GLUCOSE SERPL-MCNC: 93 MG/DL — SIGNIFICANT CHANGE UP (ref 70–99)
GLUCOSE UR QL: NEGATIVE MG/DL — SIGNIFICANT CHANGE UP
GLUCOSE UR QL: NEGATIVE MG/DL — SIGNIFICANT CHANGE UP
HCG SERPL-ACNC: <1 MIU/ML — SIGNIFICANT CHANGE UP
HCT VFR BLD CALC: 33.5 % — LOW (ref 34.5–45)
HCT VFR BLD CALC: 35 % — SIGNIFICANT CHANGE UP (ref 34.5–45)
HGB BLD-MCNC: 10.7 G/DL — LOW (ref 11.5–15.5)
HGB BLD-MCNC: 11.3 G/DL — LOW (ref 11.5–15.5)
IANC: 2.51 K/UL — SIGNIFICANT CHANGE UP (ref 1.8–7.4)
IANC: 2.66 K/UL — SIGNIFICANT CHANGE UP (ref 1.8–7.4)
IMM GRANULOCYTES NFR BLD AUTO: 0.9 % — SIGNIFICANT CHANGE UP (ref 0–0.9)
IMM GRANULOCYTES NFR BLD AUTO: 0.9 % — SIGNIFICANT CHANGE UP (ref 0–0.9)
INR BLD: 1.05 RATIO — SIGNIFICANT CHANGE UP (ref 0.85–1.16)
KETONES UR QL: 15 MG/DL
KETONES UR QL: ABNORMAL MG/DL
LEUKOCYTE ESTERASE UR-ACNC: NEGATIVE — SIGNIFICANT CHANGE UP
LEUKOCYTE ESTERASE UR-ACNC: NEGATIVE — SIGNIFICANT CHANGE UP
LYMPHOCYTES # BLD AUTO: 2.25 K/UL — SIGNIFICANT CHANGE UP (ref 1–3.3)
LYMPHOCYTES # BLD AUTO: 2.42 K/UL — SIGNIFICANT CHANGE UP (ref 1–3.3)
LYMPHOCYTES # BLD AUTO: 41.7 % — SIGNIFICANT CHANGE UP (ref 13–44)
LYMPHOCYTES # BLD AUTO: 42.7 % — SIGNIFICANT CHANGE UP (ref 13–44)
MCHC RBC-ENTMCNC: 30.5 PG — SIGNIFICANT CHANGE UP (ref 27–34)
MCHC RBC-ENTMCNC: 31 PG — SIGNIFICANT CHANGE UP (ref 27–34)
MCHC RBC-ENTMCNC: 31.9 G/DL — LOW (ref 32–36)
MCHC RBC-ENTMCNC: 32.3 G/DL — SIGNIFICANT CHANGE UP (ref 32–36)
MCV RBC AUTO: 95.4 FL — SIGNIFICANT CHANGE UP (ref 80–100)
MCV RBC AUTO: 95.9 FL — SIGNIFICANT CHANGE UP (ref 80–100)
MONOCYTES # BLD AUTO: 0.52 K/UL — SIGNIFICANT CHANGE UP (ref 0–0.9)
MONOCYTES # BLD AUTO: 0.54 K/UL — SIGNIFICANT CHANGE UP (ref 0–0.9)
MONOCYTES NFR BLD AUTO: 10 % — SIGNIFICANT CHANGE UP (ref 2–14)
MONOCYTES NFR BLD AUTO: 9.2 % — SIGNIFICANT CHANGE UP (ref 2–14)
NEUTROPHILS # BLD AUTO: 2.51 K/UL — SIGNIFICANT CHANGE UP (ref 1.8–7.4)
NEUTROPHILS # BLD AUTO: 2.66 K/UL — SIGNIFICANT CHANGE UP (ref 1.8–7.4)
NEUTROPHILS NFR BLD AUTO: 46.6 % — SIGNIFICANT CHANGE UP (ref 43–77)
NEUTROPHILS NFR BLD AUTO: 46.8 % — SIGNIFICANT CHANGE UP (ref 43–77)
NITRITE UR-MCNC: NEGATIVE — SIGNIFICANT CHANGE UP
NITRITE UR-MCNC: NEGATIVE — SIGNIFICANT CHANGE UP
NRBC # BLD AUTO: 0 K/UL — SIGNIFICANT CHANGE UP (ref 0–0)
NRBC # BLD AUTO: 0 K/UL — SIGNIFICANT CHANGE UP (ref 0–0)
NRBC # FLD: 0 K/UL — SIGNIFICANT CHANGE UP (ref 0–0)
NRBC # FLD: 0 K/UL — SIGNIFICANT CHANGE UP (ref 0–0)
NRBC BLD AUTO-RTO: 0 /100 WBCS — SIGNIFICANT CHANGE UP (ref 0–0)
NRBC BLD AUTO-RTO: 0 /100 WBCS — SIGNIFICANT CHANGE UP (ref 0–0)
PH UR: 5.5 — SIGNIFICANT CHANGE UP (ref 5–8)
PH UR: 5.5 — SIGNIFICANT CHANGE UP (ref 5–8)
PLATELET # BLD AUTO: 158 K/UL — SIGNIFICANT CHANGE UP (ref 150–400)
PLATELET # BLD AUTO: 161 K/UL — SIGNIFICANT CHANGE UP (ref 150–400)
POTASSIUM SERPL-MCNC: 4.1 MMOL/L — SIGNIFICANT CHANGE UP (ref 3.5–5.3)
POTASSIUM SERPL-MCNC: 4.4 MMOL/L — SIGNIFICANT CHANGE UP (ref 3.5–5.3)
POTASSIUM SERPL-SCNC: 4.1 MMOL/L — SIGNIFICANT CHANGE UP (ref 3.5–5.3)
POTASSIUM SERPL-SCNC: 4.4 MMOL/L — SIGNIFICANT CHANGE UP (ref 3.5–5.3)
PROT SERPL-MCNC: 7 G/DL — SIGNIFICANT CHANGE UP (ref 6–8.3)
PROT SERPL-MCNC: 7.4 G/DL — SIGNIFICANT CHANGE UP (ref 6–8.3)
PROT UR-MCNC: 30 MG/DL
PROT UR-MCNC: 30 MG/DL
PROTHROM AB SERPL-ACNC: 12.5 SEC — SIGNIFICANT CHANGE UP (ref 9.9–13.4)
RBC # BLD: 3.51 M/UL — LOW (ref 3.8–5.2)
RBC # BLD: 3.65 M/UL — LOW (ref 3.8–5.2)
RBC # FLD: 16.3 % — HIGH (ref 10.3–14.5)
RBC # FLD: 16.4 % — HIGH (ref 10.3–14.5)
RBC CASTS # UR COMP ASSIST: 2 /HPF — SIGNIFICANT CHANGE UP (ref 0–4)
RBC CASTS # UR COMP ASSIST: 4 /HPF — SIGNIFICANT CHANGE UP (ref 0–4)
REVIEW: SIGNIFICANT CHANGE UP
REVIEW: SIGNIFICANT CHANGE UP
SODIUM SERPL-SCNC: 139 MMOL/L — SIGNIFICANT CHANGE UP (ref 135–145)
SODIUM SERPL-SCNC: 140 MMOL/L — SIGNIFICANT CHANGE UP (ref 135–145)
SP GR SPEC: 1.04 — HIGH (ref 1–1.03)
SP GR SPEC: 1.07 — HIGH (ref 1–1.03)
SQUAMOUS # UR AUTO: 26 /HPF — HIGH (ref 0–5)
SQUAMOUS # UR AUTO: 7 /HPF — HIGH (ref 0–5)
TROPONIN T, HIGH SENSITIVITY RESULT: <6 NG/L — SIGNIFICANT CHANGE UP
UROBILINOGEN FLD QL: 1 MG/DL — SIGNIFICANT CHANGE UP (ref 0.2–1)
UROBILINOGEN FLD QL: 1 MG/DL — SIGNIFICANT CHANGE UP (ref 0.2–1)
VALPROATE SERPL-MCNC: 58.5 UG/ML — SIGNIFICANT CHANGE UP (ref 50–100)
WBC # BLD: 5.39 K/UL — SIGNIFICANT CHANGE UP (ref 3.8–10.5)
WBC # BLD: 5.67 K/UL — SIGNIFICANT CHANGE UP (ref 3.8–10.5)
WBC # FLD AUTO: 5.39 K/UL — SIGNIFICANT CHANGE UP (ref 3.8–10.5)
WBC # FLD AUTO: 5.67 K/UL — SIGNIFICANT CHANGE UP (ref 3.8–10.5)
WBC UR QL: 2 /HPF — SIGNIFICANT CHANGE UP (ref 0–5)
WBC UR QL: 9 /HPF — HIGH (ref 0–5)

## 2025-05-19 PROCEDURE — 99285 EMERGENCY DEPT VISIT HI MDM: CPT

## 2025-05-19 PROCEDURE — 93010 ELECTROCARDIOGRAM REPORT: CPT

## 2025-05-19 PROCEDURE — 76830 TRANSVAGINAL US NON-OB: CPT | Mod: 26

## 2025-05-19 PROCEDURE — 70496 CT ANGIOGRAPHY HEAD: CPT | Mod: 26

## 2025-05-19 PROCEDURE — 70450 CT HEAD/BRAIN W/O DYE: CPT | Mod: 26,XU

## 2025-05-19 PROCEDURE — 70498 CT ANGIOGRAPHY NECK: CPT | Mod: 26

## 2025-05-19 PROCEDURE — 0042T: CPT

## 2025-05-19 PROCEDURE — 99283 EMERGENCY DEPT VISIT LOW MDM: CPT

## 2025-05-19 RX ORDER — ACETAMINOPHEN 500 MG/5ML
1000 LIQUID (ML) ORAL ONCE
Refills: 0 | Status: DISCONTINUED | OUTPATIENT
Start: 2025-05-19 | End: 2025-05-19

## 2025-05-19 RX ORDER — OXYCODONE HYDROCHLORIDE 30 MG/1
5 TABLET ORAL ONCE
Refills: 0 | Status: DISCONTINUED | OUTPATIENT
Start: 2025-05-19 | End: 2025-05-19

## 2025-05-19 RX ORDER — ACETAMINOPHEN 500 MG/5ML
975 LIQUID (ML) ORAL ONCE
Refills: 0 | Status: COMPLETED | OUTPATIENT
Start: 2025-05-19 | End: 2025-05-19

## 2025-05-19 RX ORDER — ACETAMINOPHEN 500 MG/5ML
2 LIQUID (ML) ORAL
Qty: 56 | Refills: 0
Start: 2025-05-19 | End: 2025-05-25

## 2025-05-19 RX ORDER — DIPHENHYDRAMINE HCL 12.5MG/5ML
50 ELIXIR ORAL ONCE
Refills: 0 | Status: COMPLETED | OUTPATIENT
Start: 2025-05-19 | End: 2025-05-19

## 2025-05-19 RX ORDER — OXYCODONE HYDROCHLORIDE 30 MG/1
2.5 TABLET ORAL ONCE
Refills: 0 | Status: DISCONTINUED | OUTPATIENT
Start: 2025-05-19 | End: 2025-05-19

## 2025-05-19 RX ORDER — MIDAZOLAM IN 0.9 % SOD.CHLORID 1 MG/ML
5 PLASTIC BAG, INJECTION (ML) INTRAVENOUS ONCE
Refills: 0 | Status: DISCONTINUED | OUTPATIENT
Start: 2025-05-19 | End: 2025-05-19

## 2025-05-19 RX ORDER — OXYCODONE HYDROCHLORIDE 30 MG/1
1 TABLET ORAL
Qty: 3 | Refills: 0
Start: 2025-05-19 | End: 2025-05-19

## 2025-05-19 RX ORDER — METOCLOPRAMIDE HCL 10 MG
10 TABLET ORAL ONCE
Refills: 0 | Status: COMPLETED | OUTPATIENT
Start: 2025-05-19 | End: 2025-05-19

## 2025-05-19 RX ORDER — METHOCARBAMOL 500 MG/1
750 TABLET, FILM COATED ORAL ONCE
Refills: 0 | Status: COMPLETED | OUTPATIENT
Start: 2025-05-19 | End: 2025-05-19

## 2025-05-19 RX ADMIN — Medication 10 MILLIGRAM(S): at 01:58

## 2025-05-19 RX ADMIN — OXYCODONE HYDROCHLORIDE 2.5 MILLIGRAM(S): 30 TABLET ORAL at 03:27

## 2025-05-19 RX ADMIN — Medication 5 MILLIGRAM(S): at 07:02

## 2025-05-19 RX ADMIN — Medication 10 MILLIGRAM(S): at 10:59

## 2025-05-19 RX ADMIN — Medication 50 MILLIGRAM(S): at 02:21

## 2025-05-19 RX ADMIN — OXYCODONE HYDROCHLORIDE 2.5 MILLIGRAM(S): 30 TABLET ORAL at 00:44

## 2025-05-19 RX ADMIN — Medication 50 MILLIGRAM(S): at 05:13

## 2025-05-19 RX ADMIN — OXYCODONE HYDROCHLORIDE 5 MILLIGRAM(S): 30 TABLET ORAL at 11:59

## 2025-05-19 RX ADMIN — METHOCARBAMOL 750 MILLIGRAM(S): 500 TABLET, FILM COATED ORAL at 01:57

## 2025-05-19 RX ADMIN — Medication 975 MILLIGRAM(S): at 11:48

## 2025-05-19 RX ADMIN — Medication 975 MILLIGRAM(S): at 03:27

## 2025-05-19 NOTE — ED ADULT NURSE NOTE - OBJECTIVE STATEMENT
A&ox3. Past medical history of GERD, DM, HTN and endometriosis. Presents to the ED after  recently being seen at [facility] diagnosed with ovarian cyst.  C/O worsening pelvic pain now radiating to lower back with spotting. Denies h/a, dizziness, cp, sob, abd pain, n/v, diarrhea, constipation, falls/trauma. Respirations even and unlabored. Awaiting USIV. Urine sent to lab. Medications given as per current care plan. Safety precautions in place.
36.9

## 2025-05-19 NOTE — ED PROVIDER NOTE - PROGRESS NOTE DETAILS
Jo Contreras MD, PGY3: neuro attg and team at bedside, recommends obtaining depakote level, states pt is appropriate for dc home with outpt neuro f/u, does not require further inpatient workup given multiple presentations and workup for similar events. pt requesting DC home and pain medications at this time. pt informed of all findings and plan for dc home w/ outpt f/u instructions and return precautions. she verbalized understanding and agrees with plan all questions answered at this time. pt needs to undergo ambulation trial prior to dc. pt states she has a walker at home that she uses to assist her in ambulation when she experiences episodes of numbness and weakness Jo Contreras MD, PGY3:  Patient requesting pain medication to treat pain from ruptured hemorrhagic ovarian cyst that she was seen earlier last night for.  Will give her pain medication.  Patient is ambulating without issue using assistance from a cane, states she will use her walker when she returns home.  Patient verbalized understanding all discharge instructions.  All questions answered.  Patient to follow-up outpatient with neurologist and primary care doctor.

## 2025-05-19 NOTE — ED PROVIDER NOTE - PATIENT PORTAL LINK FT
You can access the FollowMyHealth Patient Portal offered by [facility] by registering at the following website: [email or web address]. By joining [employer]’s FollowMyHealth portal, you will also be able to view your health information using other applications (apps) compatible with our system.

## 2025-05-19 NOTE — ED ADULT NURSE REASSESSMENT NOTE - NS ED NURSE REASSESS COMMENT FT1
Pt is resting in the stretcher. Pt medicated as ordered. Pt pending lab results. Stretcher is in the lowest position and safety maintained.

## 2025-05-19 NOTE — ED ADULT NURSE NOTE - OBJECTIVE STATEMENT
ALIDA RN: 39 year old AO4 ambulatory female received as code stroke by CT for left sided weakness. Pt has a hx of CVA with left sided residuals, HTN, HLD, DM2, seizures, bipolar disorder, hypothyroidism, GERD. As per EMS pt was being transported home from earlier visit in the ED for abd pain when she started feeing the weakness which prompted EMS to bring her back. As pt was placed in CT pt started having seizure like activity, pt able to maintain patent airway, no tongue biting noted or foaming at the mouth. Pt noted to have eyes rolling to the back of her head and twitching in her cheek area. Medicated with versed as per provider order. Pt became responsive to verbal and tactile stimuli after the medication. US guided 20g IV placed in the right upper arm by ED Resident, labs drawn, imaging in progress.

## 2025-05-19 NOTE — ED ADULT NURSE REASSESSMENT NOTE - NS ED NURSE REASSESS COMMENT FT1
Informed by CT US guided IV infiltrated during agio exam, unable to finish the scan. MD Montes at the bedside with 2nd US guided line in progress. Pt safety maintained.

## 2025-05-19 NOTE — ED PROVIDER NOTE - OBJECTIVE STATEMENT
39-year-old female with past medical history of factor V deficiency on Pradaxa, A-fib, CVA with left residual weakness, seizures, endometriosis, bipolar, schizophrenia p/w R lower pelvic pain x 2 days. Pt reports intermittent pain. has been taking tylenol with minimal relief. pt reports vaginal spotting. no fever, chills, chest pain, sob, cough, n/v/d, headache, dizziness, urinary symptoms.

## 2025-05-19 NOTE — PROVIDER CONTACT NOTE (OTHER) - ASSESSMENT
Writer notified pt cleared for discharge. Pt from [facility] at [phone]. Phone number provided by pt. Writer contacted residence and spoke with staff member, Autumn, who  was informed of pt's discharge. She reports that pt can travel with supervision  via EMS for discharge. alf address is [address] [facility]. [employer] taxi arranged for pt with  [facility] at [phone]. Conf : [id number] . Trip 93A Writer notified pt cleared for discharge. Pt from [facility] at [phone]. Phone number provided by pt. Writer contacted residence and spoke with staff member, Autumn, who  was informed of pt's discharge. She reports that pt can travel with supervision  via EMS for discharge. [facility] address is [address] [facility]. EMS  arranged for pt with  [facility] at [phone]. Conf : [id number] . Trip [employer]

## 2025-05-19 NOTE — ED PROVIDER NOTE - ATTENDING CONTRIBUTION TO CARE
39-year-old female past medical history of factor V Leiden mutation with multiple DVTs and PEs, psychogenic nonepileptic seizures, vitamin D deficiency, chronic ischemic heart disease, IBS, GERD, PID, endometriosis, headaches, asthma, hypothyroidism, presents for evaluation as a code stroke for left arm and leg numbness and weakness onset 20 minutes prior to arrival. While in CT patient had episode of seizure like activity with myoclonic jerking of her right upper extremity w/ gaze deviation downwards, Episode resolved s/p 5mg IM Versed. Neuro at bedside for evaluation. Exam significant for LUE/LLE weakness and decreased sensation. Patient also w/ ?right sided facial droop and Lt facial decreased sensation. Plan to perform stroke work up including blood work and CTs. Dispo pending results and neuro recs. However, given prior history via chart review, presentation is likely d/t secondary gain for medical attention and not returning to group home.

## 2025-05-19 NOTE — ED PROVIDER NOTE - ATTENDING APP SHARED VISIT CONTRIBUTION OF CARE
39F p/w lower pelvic pain and vaginal spotting.  Was recently seen at [facility] for similar.  Took tylenol PTA w/minimal relief.  reports allergy to iburpofen.  denies urinary sx. Mild vaginal spotting.  No N/V/D, no F/c.  Exam mild SP ttp, R pelvic area.  Plan check labs TVUS.  Pt h/o PE on blood thinner.  Pt was here last week for PE and abd pain, Pan scan was negative.  Plan check labs, TVUS.  Found to have ovarian cyst with rupture.  Pt observed ambulatory in ED in NAD.  Given pain meds, feeling better.  Low suspicion for torsion or hemodynamically signficant bleeding given normal VS and benign exam.  Pt can follow up with OBGYN.    VS:  unremarkable except tachycardia improving    GEN - NAD;   malaise;   A+O x3   HEAD - NC/AT     ENT - PEERL, EOMI, mucous membranes    moist , no discharge      NECK: Neck supple, non-tender without lymphadenopathy, no masses, no JVD  PULM - CTA b/l,  symmetric breath sounds  COR -  normal heart sounds    ABD - , ND, mild lower abd ttp, soft, distractable  BACK - no CVA tenderness, nontender spine     EXTREMS - no edema, no deformity, warm and well perfused    SKIN - no rash    or bruising      NEUROLOGIC - alert, face symmetric, speech fluent, sensation nl, motor no focal deficit.

## 2025-05-19 NOTE — ED PROVIDER NOTE - CLINICAL SUMMARY MEDICAL DECISION MAKING FREE TEXT BOX
39-year-old female past medical history of factor V Leiden mutation with multiple DVTs and PEs, psychogenic nonepileptic seizures, vitamin D deficiency, chronic ischemic heart disease, IBS, GERD, PID, endometriosis, headaches, asthma, hypothyroidism, presents for evaluation as a code stroke for left arm and leg numbness and weakness onset 20 minutes prior to arrival.  Patient was evaluated in the ED last night for ovarian cyst rupture, upon leaving the emergency room through her nonemergent ambulette she reported numbness and weakness to the left upper extremity lower extremity and she was thus brought back to the emergency room.  While in CT she had a witnessed seizure-like activity with myoclonic jerking of her right upper extremity w/ gaze deviation downwards, was post ictal.  She was given IM Versed with cessation of the seizure-like activity.  Neuro was at bedside upon code stroke notification. Neuro evaluated pt at bedside and Neuro states that patient is not a candidate for TNK at this time.  There were difficulties obtaining IV access due to patient having poor vein access, an 18-gauge ultrasound IV was placed in the left upper extremity however it infiltrated when contrast was administered.  A 20-gauge ultrasound IV was placed in the right AC and CT was completed.       Upon further chart review and upon review of the complex care note, "patient presents for recurrent seizures despite consistently negative neurologic workup.  Patient has a documented history of pseudoseizures and a history of feigning seizure symptoms and signs in order to get medical admission and not be sent back to her residence" 39-year-old female past medical history of factor V Leiden mutation with multiple DVTs and PEs, psychogenic nonepileptic seizures, vitamin D deficiency, chronic ischemic heart disease, IBS, GERD, PID, endometriosis, headaches, asthma, hypothyroidism, presents for evaluation as a code stroke for left arm and leg numbness and weakness onset 20 minutes prior to arrival.  Patient was evaluated in the ED last night for ovarian cyst rupture, upon leaving the emergency room through her nonemergent ambulette she reported numbness and weakness to the left upper extremity lower extremity and she was thus brought back to the emergency room.  While in CT she had a witnessed seizure-like activity with myoclonic jerking of her right upper extremity w/ gaze deviation downwards, was post ictal.  She was given IM Versed with cessation of the seizure-like activity.  Neuro was at bedside upon code stroke notification. Neuro evaluated pt at bedside and Neuro states that patient is not a candidate for TNK at this time.  There were difficulties obtaining IV access due to patient having poor vein access, an 18-gauge ultrasound IV was placed in the left upper extremity however it infiltrated when contrast was administered.  A 20-gauge ultrasound IV was placed in the right AC and CT was completed.     Upon further chart review and upon review of the complex care note, "patient presents for recurrent seizures despite consistently negative neurologic workup.  Patient has a documented history of pseudoseizures and a history of feigning seizure symptoms and signs in order to get medical admission and not be sent back to her residence"    ddx includes but is not limited to migraine vs stroke vs pseudoseizure vs seizure vs electrolyte derangement vs functional neuro disorder. pending labs and imaging at this time, dispo and recs pending neuro.

## 2025-05-19 NOTE — ED PROVIDER NOTE - CLINICAL SUMMARY MEDICAL DECISION MAKING FREE TEXT BOX
39-year-old female with past medical history of factor V deficiency on Pradaxa, A-fib, CVA with left residual weakness, seizures, endometriosis, bipolar, schizophrenia p/w R lower pelvic pain x 2 days. Pt reports intermittent pain. has been taking tylenol with minimal relief. pt reports vaginal spotting. no fever, chills, chest pain, sob, cough, n/v/d, headache, dizziness, urinary symptoms.     plan  - labs  - tvus  - reassess

## 2025-05-19 NOTE — STROKE CODE NOTE - [facility] STROKE SCALE DATE
----- Message from Lane Gonzales RN sent at 4/4/2019  4:40 PM CDT -----  2pm on 4/10  ----- Message -----  From: Jolly Cee RN  Sent: 4/4/2019   4:34 PM  To: Lane Gonzales, RN    Sage Sherman, can you look at your schedule for time to offer Mr. Maxwell  Thanks!  ----- Message -----  From: Amie Kenney RN  Sent: 4/4/2019   3:56 PM  To: Jolly Cee RN    He has already had multiple lines of therapy. The only thing I can suggest is him coming here to speak with [facility] about treatment options. We can send STRATA to see if he has genetic mutation. I do not have a trial for him at the moment.   ----- Message -----  From: Jolly Cee RN  Sent: 4/4/2019   3:42 PM  To: SEMAJ Mas we received a Clinical Trial Referral for Mr. Maxwell from Dr. Monet, [facility].  Can you review and let me know how I can assist.  Thanks  Jolly  [id number]        
19-May-2025 06:45
done

## 2025-05-19 NOTE — CONSULT NOTE ADULT - SUBJECTIVE AND OBJECTIVE BOX
Neurology - Consult Note    -  Spectra: [phone] ([facility]), [phone] ([facility])  -    HPI: 39RHF with a PMHx of obesity, factor V Leiden deficiency a/w DVT/PE (on Eliquis 5mg BID), bipolar disorder, hypothyroidism, PNES, IBS, GERD, PID, endometriosis, asthma, personality disorder, and mild intellectual disability who presented to [facility] ED from group home on 5/19/2025 as code stroke due to left sided numbness sensation. LKW at 6:15 AM on 5/19/2025. Patient             Review of Systems:  INCOMPLETE   CONSTITUTIONAL: No fevers or chills  EYES AND ENT: No visual changes or no throat pain   NECK: No pain or stiffness  RESPIRATORY: No hemoptysis or shortness of breath  CARDIOVASCULAR: No chest pain or palpitations  GASTROINTESTINAL: No melena or hematochezia  GENITOURINARY: No dysuria or hematuria  NEUROLOGICAL: +As stated in HPI above  SKIN: No itching, burning, rashes, or lesions   All other review of systems is negative unless indicated above.    Allergies:  penicillin (Hives)  latex (Blisters)  morphine (Unknown)  TraZODone Hydrochloride (Unknown)  [This allergen will not trigger allergy alert] Toradol SOLN (Unknown)  TraMADol Hydrochloride (Unknown)  Toradol (Hives)  Zofran (Hives)  ibuprofen (Pruritus)  [This allergen will not trigger allergy alert] pineapple allergenic extract (Swelling)  trazodone (Other)      PMHx/PSHx/Family Hx: As above, otherwise see below   Asthma    Seizure    Factor V Leiden    Bipolar disorder    Endometriosis    History of DVT in adulthood    Seizures    Hypothyroid    Seasonal allergies    Insomnia    GERD (gastroesophageal reflux disease)    HTN (hypertension)    Diabetes    DVT (deep venous thrombosis)    Factor V Leiden        Social Hx:  No current use of tobacco, alcohol, or illicit drugs  Lives with ***    Medications:  MEDICATIONS  (STANDING):    MEDICATIONS  (PRN):      Vitals:  T(C): 37 (05-19-25 @ 06:44), Max: 37 (05-19-25 @ 06:44)  HR: 95 (05-19-25 @ 06:44) (87 - 101)  BP: 121/87 (05-19-25 @ 06:44) (121/87 - 140/90)  RR: 18 (05-19-25 @ 06:44) (16 - 18)  SpO2: 100% (05-19-25 @ 06:44) (98% - 100%)    Physical Examination: INCOMPLETE  General - NAD  Cardiovascular - Peripheral pulses palpable, no edema  Eyes - Fundoscopy with flat, sharp optic discs and no hemorrhage or exudates; Fundoscopy not well visualized    Neurologic Exam:  Mental status - Eyes open, attending to examiner. Awake, Alert, Oriented to person, place, and time. Speech fluent, repetition and naming intact. Follows simple and complex commands. Attention/concentration, recent and remote memory (including registration and recall), and fund of knowledge intact    Cranial nerves - PERRL, VFF, EOMI, face sensation (V1-V3) intact b/l, facial strength intact without asymmetry b/l, hearing intact b/l, palate with symmetric elevation, trapezius 5/5 strength b/l, tongue midline on protrusion with full lateral movement    Motor - Normal bulk and tone throughout. No pronator drift.  Strength testing            Deltoid      Biceps      Triceps     Wrist Extension    Wrist Flexion     Interossei         R            5                 5               5                     5                            5                        5                    5  L             5                 5               5                     5                            5                        5                    5              Hip Flexion    Hip Extension    Knee Flexion    Knee Extension    Dorsiflexion    Plantar Flexion  R              5                           5                       5                           5                            5                          5  L              5                           5                        5                           5                            5                          5    Sensation - Light touch intact throughout    DTR's -             Biceps      Triceps     Brachioradialis      Patellar    Ankle    Toes/plantar response  R             2+             2+                  2+                       2+            2+                 Down  L              2+             2+                 2+                        2+           2+                 Down    Coordination - Finger to Nose intact b/l. No tremors appreciated    Gait and station - Normal casual gait. Romberg (-)    Labs:                        10.7   5.67  )-----------( 158      ( 19 May 2025 01:50 )             33.5     05-19    140  |  106  |  14  ----------------------------<  108[H]  4.4   |  18[L]  |  0.72    Ca    9.3      19 May 2025 01:50    TPro  7.0  /  Alb  4.0  /  TBili  <0.2  /  DBili  x   /  AST  18  /  ALT  23  /  AlkPhos  78  05-19    CAPILLARY BLOOD GLUCOSE      POCT Blood Glucose.: 80 mg/dL (19 May 2025 06:46)    LIVER FUNCTIONS - ( 19 May 2025 01:50 )  Alb: 4.0 g/dL / Pro: 7.0 g/dL / ALK PHOS: 78 U/L / ALT: 23 U/L / AST: 18 U/L / GGT: x             Urinalysis with Rflx Culture (collected 19 May 2025 01:00)        CSF:                  Radiology:     Neurology - Consult Note    -  Spectra: [phone] ([facility]), [phone] ([facility])  -    HPI: 39RHF with a PMHx of obesity, factor V Leiden deficiency a/w DVT/PE (on Eliquis 5mg BID), bipolar disorder, hypothyroidism, PNES, IBS, GERD, PID, endometriosis, asthma, personality disorder, and mild intellectual disability who presented to [facility] ED from group home on 5/19/2025 as code stroke due to left sided numbness sensation. LKW at 6:15 AM on 5/19/2025. Patient was seen at [facility] ED on 5/18/2025 due to abdominal pain and was on her way back to her group home with EMS when she stated that she was starting to have left arm numbness. That then progressed to left face and left leg numbness. Upon ED attending examination, patient had decreased activation to smile on the right so code stroke was called. While patient was awaiting CT scan, patient started having right hand jerking movements, followed by head turn to right, rolling of eyelids upward, and did not have any response to verbal stimuli. Ambulates independently at baseline.             Review of Systems: unable to obtain due to medical conditions      Allergies:  penicillin (Hives)  latex (Blisters)  morphine (Unknown)  TraZODone Hydrochloride (Unknown)  [This allergen will not trigger allergy alert] Toradol SOLN (Unknown)  TraMADol Hydrochloride (Unknown)  Toradol (Hives)  Zofran (Hives)  ibuprofen (Pruritus)  [This allergen will not trigger allergy alert] pineapple allergenic extract (Swelling)  trazodone (Other)      PMHx/PSHx/Family Hx: As above, otherwise see below   Asthma    Seizure    Factor V Leiden    Bipolar disorder    Endometriosis    History of DVT in adulthood    Seizures    Hypothyroid    Seasonal allergies    Insomnia    GERD (gastroesophageal reflux disease)    HTN (hypertension)    Diabetes    DVT (deep venous thrombosis)    Factor V Leiden        Social Hx:  No current use of tobacco, alcohol, or illicit drugs  Lives in group home    Medications:  MEDICATIONS  (STANDING):    MEDICATIONS  (PRN):      Vitals:  T(C): 37 (05-19-25 @ 06:44), Max: 37 (05-19-25 @ 06:44)  HR: 95 (05-19-25 @ 06:44) (87 - 101)  BP: 121/87 (05-19-25 @ 06:44) (121/87 - 140/90)  RR: 18 (05-19-25 @ 06:44) (16 - 18)  SpO2: 100% (05-19-25 @ 06:44) (98% - 100%)    Physical Examination:  General - NAD  Cardiovascular - no edema    Neurologic Exam:  Mental status - Eyes closed, not attending to examiner. No verbal output noted. Not following simple or complex commands.     Cranial nerves - PERRL, visual fields intact to threat b/l, gaze to right EOMI, face sensation (V1-V3) intact b/l, facial strength intact without asymmetry b/l, hearing intact b/l, palate with symmetric elevation, trapezius 5/5 strength b/l, tongue midline on protrusion with full lateral movement    Motor - Normal bulk and tone throughout. No pronator drift.  Strength testing            Deltoid      Biceps      Triceps     Wrist Extension    Wrist Flexion     Interossei         R            5                 5               5                     5                            5                        5                    5  L             5                 5               5                     5                            5                        5                    5              Hip Flexion    Hip Extension    Knee Flexion    Knee Extension    Dorsiflexion    Plantar Flexion  R              5                           5                       5                           5                            5                          5  L              5                           5                        5                           5                            5                          5    Sensation - Light touch intact throughout    DTR's -             Biceps      Triceps     Brachioradialis      Patellar    Ankle    Toes/plantar response  R             2+             2+                  2+                       2+            2+                 Down  L              2+             2+                 2+                        2+           2+                 Down    Coordination - Finger to Nose intact b/l. No tremors appreciated    Gait and station - Normal casual gait. Romberg (-)    Labs:                        10.7   5.67  )-----------( 158      ( 19 May 2025 01:50 )             33.5     05-19    140  |  106  |  14  ----------------------------<  108[H]  4.4   |  18[L]  |  0.72    Ca    9.3      19 May 2025 01:50    TPro  7.0  /  Alb  4.0  /  TBili  <0.2  /  DBili  x   /  AST  18  /  ALT  23  /  AlkPhos  78  05-19    CAPILLARY BLOOD GLUCOSE      POCT Blood Glucose.: 80 mg/dL (19 May 2025 06:46)    LIVER FUNCTIONS - ( 19 May 2025 01:50 )  Alb: 4.0 g/dL / Pro: 7.0 g/dL / ALK PHOS: 78 U/L / ALT: 23 U/L / AST: 18 U/L / GGT: x             Urinalysis with Rflx Culture (collected 19 May 2025 01:00)        CSF:                  Radiology:     Neurology - Consult Note    -  Spectra: [phone] ([facility]), [phone] ([facility])  -    HPI: 39RHF with a PMHx of obesity, factor V Leiden deficiency a/w DVT/PE (on Eliquis 5mg BID), bipolar disorder, hypothyroidism, PNES, IBS, GERD, PID, endometriosis, asthma, personality disorder, and mild intellectual disability who presented to [facility] ED from group home on 5/19/2025 as code stroke due to left sided numbness sensation. LKW at 6:15 AM on 5/19/2025. Patient was seen at [facility] ED on 5/18/2025 due to right lower pelvic pain and was on her way back to her group home with EMS when she stated that she was starting to have left arm numbness. That then progressed to left face and left leg numbness. Upon ED attending examination, patient had decreased activation to smile on the right as well as left sided sensory deficits so code stroke was called. Patient then started having right hand jerking movements, followed by head turn to right, rolling of eyelids upward, and did not have any response to verbal stimuli. Last dose of eliquis was on 5/18/2025 PM. Ambulates independently at baseline.             Review of Systems: unable to obtain due to medical conditions      Allergies:  penicillin (Hives)  latex (Blisters)  morphine (Unknown)  TraZODone Hydrochloride (Unknown)  [This allergen will not trigger allergy alert] Toradol SOLN (Unknown)  TraMADol Hydrochloride (Unknown)  Toradol (Hives)  Zofran (Hives)  ibuprofen (Pruritus)  [This allergen will not trigger allergy alert] pineapple allergenic extract (Swelling)  trazodone (Other)      PMHx/PSHx/Family Hx: As above, otherwise see below   Asthma    Seizure    Factor V Leiden    Bipolar disorder    Endometriosis    History of DVT in adulthood    Seizures    Hypothyroid    Seasonal allergies    Insomnia    GERD (gastroesophageal reflux disease)    HTN (hypertension)    Diabetes    DVT (deep venous thrombosis)    Factor V Leiden        Social Hx:  No current use of tobacco, alcohol, or illicit drugs  Lives in group home    Medications:  MEDICATIONS  (STANDING):    MEDICATIONS  (PRN):      Vitals:  T(C): 37 (05-19-25 @ 06:44), Max: 37 (05-19-25 @ 06:44)  HR: 95 (05-19-25 @ 06:44) (87 - 101)  BP: 121/87 (05-19-25 @ 06:44) (121/87 - 140/90)  RR: 18 (05-19-25 @ 06:44) (16 - 18)  SpO2: 100% (05-19-25 @ 06:44) (98% - 100%)    Physical Examination:  General - NAD  Cardiovascular - no edema    Neurologic Exam:  Mental status - Eyes closed, not attending to examiner. No verbal output noted. Not following simple or complex commands.     Cranial nerves - PERRL, visual fields intact to threat b/l, gaze to right EOMI, face sensation (V1-V3) intact b/l, facial strength intact without asymmetry b/l, hearing intact b/l, palate with symmetric elevation, trapezius 5/5 strength b/l, tongue midline on protrusion with full lateral movement    Motor - Normal bulk and tone throughout. No pronator drift.  Strength testing            Deltoid      Biceps      Triceps     Wrist Extension    Wrist Flexion     Interossei         R            5                 5               5                     5                            5                        5                    5  L             5                 5               5                     5                            5                        5                    5              Hip Flexion    Hip Extension    Knee Flexion    Knee Extension    Dorsiflexion    Plantar Flexion  R              5                           5                       5                           5                            5                          5  L              5                           5                        5                           5                            5                          5    Sensation - Light touch intact throughout    DTR's -             Biceps      Triceps     Brachioradialis      Patellar    Ankle    Toes/plantar response  R             2+             2+                  2+                       2+            2+                 Down  L              2+             2+                 2+                        2+           2+                 Down    Coordination - Finger to Nose intact b/l. No tremors appreciated    Gait and station - Normal casual gait. Romberg (-)    Labs:                        10.7   5.67  )-----------( 158      ( 19 May 2025 01:50 )             33.5     05-19    140  |  106  |  14  ----------------------------<  108[H]  4.4   |  18[L]  |  0.72    Ca    9.3      19 May 2025 01:50    TPro  7.0  /  Alb  4.0  /  TBili  <0.2  /  DBili  x   /  AST  18  /  ALT  23  /  AlkPhos  78  05-19    CAPILLARY BLOOD GLUCOSE      POCT Blood Glucose.: 80 mg/dL (19 May 2025 06:46)    LIVER FUNCTIONS - ( 19 May 2025 01:50 )  Alb: 4.0 g/dL / Pro: 7.0 g/dL / ALK PHOS: 78 U/L / ALT: 23 U/L / AST: 18 U/L / GGT: x             Urinalysis with Rflx Culture (collected 19 May 2025 01:00)        CSF:                  Radiology:    CT Brain Stroke Protocol (05.19.25 @ 07:13) >  No acute intracranial hemorrhage, mass effect, or midline shift.     Neurology - Consult Note    -  Spectra: [phone] ([facility]), [phone] ([facility])  -    HPI: 39RHF with a PMHx of obesity, factor V Leiden deficiency a/w DVT/PE (on Eliquis 5mg BID), hx of stroke in 2018 and 2019 presenting as left sided deficits, currently no residual deficits, PNES, bipolar disorder, schizophrenia, hypothyroidism, IBS, GERD, PID, endometriosis, asthma who presented to [facility] ED from group home on 5/19/2025 as code stroke due to left sided numbness sensation. LKW at 6:00 AM on 5/19/2025. Patient was seen at [facility] ED on 5/18/2025 due to right lower pelvic pain and was on her way back to her group home with EMS when she stated that she was starting to have left arm numbness. That then progressed to left face and left leg numbness around 0620, when EMS found her to be altered and slurring her words. Around 0645, when code stroke was called, she was altered, no eye opening or verbal response to painful stimuli, noted to have right arm jerking movements, face turn to right, eyes closed and deviated upwards. Lasted for about 5-10min when she was given versed 5 by the ED team for concern for seizure. On reassessment around 0715, she was more wake, able to say her name, but didn't know the month, she mentioned that she started feeling numb in the arm first f/b face arm and leg numbness and weakness. She still feels weak and numb in left face, arm and leg. She takes eliquis 5 BID, Last dose of eliquis was on 5/18/2025 PM. She also mentions hx of seizure and that she is on VPA 500mg TID and other unknown med, unclear who is her neurologist. Denies any acute pain or distress. Ambulates independently at baseline, lives in group home. BG 80, /70.      Review of Systems: unable to obtain due to medical conditions      Allergies:  penicillin (Hives)  latex (Blisters)  morphine (Unknown)  TraZODone Hydrochloride (Unknown)  [This allergen will not trigger allergy alert] Toradol SOLN (Unknown)  TraMADol Hydrochloride (Unknown)  Toradol (Hives)  Zofran (Hives)  ibuprofen (Pruritus)  [This allergen will not trigger allergy alert] pineapple allergenic extract (Swelling)  trazodone (Other)      PMHx/PSHx/Family Hx: As above, otherwise see below   Asthma    Seizure    Factor V Leiden    Bipolar disorder    Endometriosis    History of DVT in adulthood    Seizures    Hypothyroid    Seasonal allergies    Insomnia    GERD (gastroesophageal reflux disease)    HTN (hypertension)    Diabetes    DVT (deep venous thrombosis)    Factor V Leiden        Social Hx:  No current use of tobacco, alcohol, or illicit drugs  Lives in group home    Medications:  MEDICATIONS  (STANDING):    MEDICATIONS  (PRN):      Vitals:  T(C): 37 (05-19-25 @ 06:44), Max: 37 (05-19-25 @ 06:44)  HR: 95 (05-19-25 @ 06:44) (87 - 101)  BP: 121/87 (05-19-25 @ 06:44) (121/87 - 140/90)  RR: 18 (05-19-25 @ 06:44) (16 - 18)  SpO2: 100% (05-19-25 @ 06:44) (98% - 100%)    Physical Examination:  General - NAD  Cardiovascular - no edema    Neurologic Exam: Initially drowsy, no motor, eye opening, verbal response to painful stimuli, right arm shaking, able to hold arm up while drowsy-->later 0715 more awake able to answer questions    Mental status - eye opening to verbal commands, speech fluent, able top repeat and name, able to follow simple commands,     Cranial nerves - pupil 5mm, reactive, visual fields intact to threat b/l, EOMI, face sensation decreased on left face, facial strength intact without asymmetry b/l, hearing intact b/l, tongue midline on protrusion with full lateral movement    Motor - Normal bulk and tone throughout. No pronator drift.  Strength testing (likely post ictal)            Deltoid      Biceps      Triceps     Wrist Extension    Wrist Flexion     Interossei         R            5                 5               5                     5                            5                        5                    5  L             5                 5               5                     5                            5                        5                    4              Hip Flexion    Hip Extension    Knee Flexion    Knee Extension    Dorsiflexion    Plantar Flexion  R              5                           5                       5                           5                            5                          5  L              2                           2                        2                           2                            2                          2    Sensation - Light touch decreased on left throughout    DTR's -  1+ throughout, plantars mute    Coordination - CRYSTAL    Gait and station - CRYSTAL    Labs:                        10.7   5.67  )-----------( 158      ( 19 May 2025 01:50 )             33.5     05-19    140  |  106  |  14  ----------------------------<  108[H]  4.4   |  18[L]  |  0.72    Ca    9.3      19 May 2025 01:50    TPro  7.0  /  Alb  4.0  /  TBili  <0.2  /  DBili  x   /  AST  18  /  ALT  23  /  AlkPhos  78  05-19    CAPILLARY BLOOD GLUCOSE      POCT Blood Glucose.: 80 mg/dL (19 May 2025 06:46)    LIVER FUNCTIONS - ( 19 May 2025 01:50 )  Alb: 4.0 g/dL / Pro: 7.0 g/dL / ALK PHOS: 78 U/L / ALT: 23 U/L / AST: 18 U/L / GGT: x             Urinalysis with Rflx Culture (collected 19 May 2025 01:00)        CSF:                  Radiology:    CT Brain Stroke Protocol (05.19.25 @ 07:13) >  No acute intracranial hemorrhage, mass effect, or midline shift.    < from: CT Angio Brain Stroke Protocol  w/ IV Cont (05.19.25 @ 07:48) >  CT PERFUSION:  TECHNICAL LIMITATIONS: Poor bolus timing. Patient motion.    CBF<30%/CORE INFARCTION: 0 mL  TMAX>6s/UNDERPERFUSED TISSUE: 667 mL  MISMATCH VOLUME/TISSUE AT RISK: 0 mL  MISMATCH RATIO: Infinite.    MISCELLANEOUS: Patient's IV access malfunctioned during the perfusion   scan.    CTA NECK:  No evidence of significant stenosis or occlusion.    CTA HEAD:  No large vessel occlusion, significant stenosis or vascular abnormality   identified.    < end of copied text >    < from: CT Head No Cont (05.08.25 @ 18:40) >  IMPRESSION:  No acute intracranial hemorrhage, mass effect, or midline shift.    < end of copied text >    < from: MR Head w/wo IV Cont (03.10.25 @ 20:14) >  IMPRESSION:    MRI BRAIN 3/2024:  No hydrocephalus, mass effect, acute intracranial hemorrhage, vasogenic   edema, restricted diffusion, or acute territorial infarct.  No abnormal parenchymal, leptomeningeal, or dural enhancement.    Similar-appearing 0.7 x 1.6 x 0.8 cm (maximal AP x TV x CC dimensions) T1   hyperintense lesion within the mid and posterior pituitary gland, grossly   similar to MRI brain 3/2/2022. 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    MRI ORBITS:  Intraorbital contents unremarkable.  No abnormal signal or enhancement within the optic nerves or optic chiasm.  No abnormal enhancement within the orbits.    < end of copied text >    EEG Summary: 7/2022  Abnormal EEG in the awake, drowsy and asleep states.  Generalized, mild, slowing  Three typical clinical events on day 2 and 3 without any associated electrographic abnormalities.      Impression/Clinical Correlate:  Three typical prolonged clinical events on day 2 and 3 without any associated electrographic abnormalities, clinically most concerning for psychogenic etiology aka psychogenic nonepileptic attacks or functional seizures.  Mild diffuse cerebral dysfunction   No epileptiform pattern or seizure observed

## 2025-05-19 NOTE — ED PROVIDER NOTE - PHYSICAL EXAMINATION
GENERAL: calm   HEAD: normocephalic, atraumatic  HEENT: normal conjunctiva, oral mucosa moist,   CARDIAC: regular rate and rhythm, no appreciable murmurs,   PULM: normal breath sounds, clear to ascultation bilaterally, no rales, rhonchi, wheezing  GI: abdomen nondistended, soft,   NEURO: no focal motor  deficits, deminished sensation on the left face and left side of body, CN2-12 intact, normal speech, PERRLA, EOMI, unable to ambulate, AAOx3  MSK: 2/5  strength left hand, 2/5 strength LLE, 5/5   SKIN: well-perfused, extremities warm, no visible rashes  PSYCH: appropriate mood and affect

## 2025-05-19 NOTE — ED ADULT NURSE REASSESSMENT NOTE - NS ED NURSE REASSESS COMMENT FT1
Pt brought into Rm1, placed on the cardiac monitor, noted to be sinus rhythm. EKG done. Dysphagia passes. Pending neuro recs. Pt safety maintained.

## 2025-05-19 NOTE — ED ADULT NURSE NOTE - NSFALLUNIVINTERV_ED_ALL_ED
Bed/Stretcher in lowest position, wheels locked, appropriate side rails in place/Call bell, personal items and telephone in reach/Instruct patient to call for assistance before getting out of bed/chair/stretcher/Non-slip footwear applied when patient is off stretcher/[place] to call system/Physically safe environment - no spills, clutter or unnecessary equipment/Purposeful proactive rounding/Room/bathroom lighting operational, light cord in reach

## 2025-05-19 NOTE — ED ADULT TRIAGE NOTE - HEIGHT IN INCHES
LOV: 06/08/2020  NOV:11/16/2020  Last fill: 12/31/2019      Refilled per protocol.  tolterodine (DETROL LA) 4 MG 24 hr capsule Take 1 capsule by mouth daily. Do not take ditropan while taking this- one month trial       
8

## 2025-05-19 NOTE — ED PROVIDER NOTE - NSFOLLOWUPINSTRUCTIONS_ED_ALL_ED_FT
You were seen in the emergency room for weakness and numbness of the left side of your body.    We obtained CAT scans and lab work which the results are included in this discharge packet.    Our neurology team evaluated you at bedside reviewed all of your results and recommended that you follow-up with your outpatient neurologist for further evaluation and management.    Please return to the emergency room if you have any new or worsening symptoms    You may take Tylenol as needed for pain.  Please only follow all package instructions. please do not take more than 4000mg in a 24 hour period. You were seen in the emergency room for weakness and numbness of the left side of your body.    We obtained CAT scans and lab work which the results are included in this discharge packet.    Our neurology team evaluated you at bedside reviewed all of your results and recommended that you follow-up with your outpatient neurologist for further evaluation and management.    Please return to the emergency room if you have any new or worsening symptoms    You may take Tylenol as needed for pain.  Please only follow all package instructions. please do not take more than 4000mg in a 24 hour period.    We sent oxycodone to your pharmacy please only take for severe breakthrough pain. please take 5mg every 8 hours only as needed. do not take more than 15mg in 24 hours.    Please use your walker as needed

## 2025-05-19 NOTE — ED ADULT TRIAGE NOTE - CHIEF COMPLAINT QUOTE
c/o left sided numbness since being discharged 20 min ago from the hospital. Pt denies CP, SOB, N/V/D. Fever and Chills. Hx CVA (left sided weakness)   MD Montes at the bedside. Code Stroke Activated

## 2025-05-19 NOTE — ED ADULT NURSE NOTE - CHIEF COMPLAINT QUOTE
pt recently seen at [facility] dx ovarian cyst c/o worsening pelvic pain now radiating to lower back with spotting. denies h/a, dizziness, cp, sob, abd pain, n/v, diarrhea, constipation, falls/trauma. hx endometriosis. well appearing in triage

## 2025-05-19 NOTE — CONSULT NOTE ADULT - ATTENDING COMMENTS
DOS: 5/19/2025    38 yo female w. hx of PNES/functional neurological disorder, Bipolar disorder, Schizophrenia, Factor V leiden deficiency on eliquis, migraine headaches, p/w seizure like episode and left sided weakness/numbness. She has had multiple hospital admissions in the past for seizure like spells and vEEG confirmed non epileptic events. She has also presented with stroke like symptoms and repeated MRI brain imaging have been normal.   Her current presentation seems consistent with functional neurological disorder. There is give way weakness on exam with midline splitting sensory deficits. No convincing facial droop. CTH in ED normal.  Of note, she was admitted to OSH last month for "seizures" and her depakote dose was increased to 500 mg TID. No EEG recorded was obtained at the time. Will draw VPA level.  No further neurological work up needed.   Pt advised to follow up with neuro and psych outpatient.

## 2025-05-19 NOTE — ED PROVIDER NOTE - NSFOLLOWUPINSTRUCTIONS_ED_ALL_ED_FT
Follow up with your primary care physician in 48-72 hours- bring copies of your results.  Return to the ER for worsening or persistent symptoms, including but not limited to worsening/persistent pain, shortness of breath, fevers, vomiting, lightheadedness, passing out and/or ANY NEW OR CONCERNING SYMPTOMS. If you have issues obtaining follow up, please call: [phone] to obtain a doctor or specialist who takes your insurance in your area.  You may call [phone] to make an appointment with the internal medicine clinic.

## 2025-05-19 NOTE — CHART NOTE - NSCHARTNOTEFT_GEN_A_CORE
was informed by provider that patient is discharged and needs assistance getting transportation home. Patient resides on [facility]/[address] [place] [id number]; P: [phone].  contacted the group home and spoke to Healthcare Coordinator, Barbara, who requested patient return via ambulance for supervision.       scheduled ambulance with [facility]; trip # 292. Patient will be picked up with the hr.  informed provider of above. No further social work intervention needed.

## 2025-05-19 NOTE — ED PROVIDER NOTE - PATIENT PORTAL LINK FT
You can access the FollowMyHealth Patient Portal offered by [facility] by registering at the following website: [email or web address]. By joining [employer]’s FollowMyHealth portal, you will also be able to view your health information using other applications (apps) compatible with our system.

## 2025-05-19 NOTE — CONSULT NOTE ADULT - ASSESSMENT
Assessment:    NIHSS: 24  mRS: 1  Not tenecteplase candidate due to seizure activity at presentation.   Not thrombectomy candidate due to no LVO.     Impression: Initial left sided sensory deficits that progressed to right arm tonic movements with head turn to right, localizing to left brain dysfunction. Etiology unlikely acute ischemic stroke, more likely PNES.   Recommendations:  - Continue Eliquis 5 mg BID  - EEG  - Will hold off on repeat MRI brain for now as patient had negative imaging for stroke in 3/2025  - HbA1C and Lipid Panel.  - Continue home Atorvastatin 80MG QHS, titrate to LDL<70  - Telemonitoring; Neurochecks and vital signs Q4H  - BP goal 130/90  - BG goal <180, avoid hypoglycemia  - NPO until clears dysphagia screen, otherwise swallow evaluation  - Fall, aspiration precautions   Assessment:  39RHF with a PMHx of obesity, factor V Leiden deficiency a/w DVT/PE (on Eliquis 5mg BID), hx of stroke in 2018 and 2019 presenting as left sided deficits, currently no residual deficits, PNES, bipolar disorder, schizophrenia, hypothyroidism, IBS, GERD, PID, endometriosis, asthma who presented to [facility] ED from group home on 5/19/2025 as code stroke due to left sided numbness sensation. LKW at 6:00 AM on 5/19/2025. Started w left arm numbness, later left face, arm and leg numbness and weakness, later was drowsy, unresponsive, right arm shaking, able to resist movement, but got versed 5 IM once, later was more awake, answering questions, w left arm weakness distally and left leg weakness both proximally and distally, could be post ictal. /87, FS 80. In terms of stroke hx, patient mentions had stroke in 2018 and 2019 w left sided deficits, now resolved prior presentation. She is on eliquis 5 BID for factor v leiden deficiency and DVT hx, last dose 5/18 PM. In terms of seizure hx, previous similar episodes, w negative EEG, thought to be PNES. As per 2022 outpatient note, she is on oxcarb 150 BID, as per patient she is on VPA and another ASM, unclear which. Prior MRI, EEG have been negative.        NIHSS: 24  mRS: 1  Not tenecteplase candidate due to seizure activity at presentation.   Not thrombectomy candidate due to no LVO.     Impression: Initial left sided sensory deficits that progressed to right arm tonic movements with head turn to right, localizing to left brain dysfunction. Etiology unlikely acute ischemic stroke, more likely seizure vs PNES.     Recommendations:  - vEEG to rule out seizure activity  - clarify doses and indications for ASMs  - Continue Eliquis 5 mg BID  - Will hold off on repeat MRI brain for now as patient had negative imaging for stroke in 3/2025  - HbA1C and Lipid Panel.  - Continue home Atorvastatin 80MG QHS, titrate to LDL<70  - Telemonitoring; Neurochecks and vital signs Q4H  - BP goal 130/90  - BG goal <180, avoid hypoglycemia  - NPO until clears dysphagia screen, otherwise swallow evaluation  - Fall, aspiration precautions      Case discussed with stroke neurology attending. Case to be discussed with general neurology attending Assessment:  39RHF with a PMHx of obesity, factor V Leiden deficiency a/w DVT/PE (on Eliquis 5mg BID), hx of stroke in 2018 and 2019 presenting as left sided deficits, currently no residual deficits, PNES, bipolar disorder, schizophrenia, hypothyroidism, IBS, GERD, PID, endometriosis, asthma who presented to [facility] ED from group home on 5/19/2025 as code stroke due to left sided numbness sensation. LKW at 6:00 AM on 5/19/2025. Started w left arm numbness, later left face, arm and leg numbness and weakness, later was drowsy, unresponsive, right arm shaking, able to resist movement, but got versed 5 IM once, later was more awake, answering questions, w left arm weakness distally and left leg weakness both proximally and distally, could be post ictal. /87, FS 80. In terms of stroke hx, patient mentions had stroke in 2018 and 2019 w left sided deficits, now resolved prior presentation. She is on eliquis 5 BID for factor v leiden deficiency and DVT hx, last dose 5/18 PM. In terms of seizure hx, previous similar episodes, w negative EEG, thought to be PNES. As per 2022 outpatient note, she is on oxcarb 150 BID, as per patient she is on VPA and another ASM, unclear which. Prior MRI, EEG have been negative.        NIHSS: 24  mRS: 1  Not tenecteplase candidate due to seizure activity at presentation.   Not thrombectomy candidate due to no LVO.     Impression:   Initial left sided sensory deficits that progressed to right arm tonic movements with head turn to right  etiology likely functional neurological disorder    Recommendations:  -Patient can follow up with Dr. English -at [address] [place] [address] ([phone]) 1-2 weeks after discharge. Please instruct the patient to call the respective numbers to schedule this appointment. We encouraged to bring OSH ([place]) records to visit  -No further inpatient/emergent neurological workup/treatment changes indicated.  -VPA was reportedtly started at  [facility] for "epileptic seizures", per pt no EEG done. Likely can wean as outpatient  -Continue home meds  -Follow up psych as well  -No neuro specific BP goals, she did not have a stroke        Case discussed with stroke neurology attending.   Case seen and discussed with general neurology attending on morning rounds. Prior plan modified given this assessment.

## 2025-05-21 ENCOUNTER — EMERGENCY (EMERGENCY)
Facility: HOSPITAL | Age: 40
LOS: 1 days | End: 2025-05-21
Attending: EMERGENCY MEDICINE | Admitting: EMERGENCY MEDICINE
Payer: MEDICARE

## 2025-05-21 VITALS
HEIGHT: 68 IN | SYSTOLIC BLOOD PRESSURE: 112 MMHG | DIASTOLIC BLOOD PRESSURE: 81 MMHG | TEMPERATURE: 98 F | OXYGEN SATURATION: 99 % | RESPIRATION RATE: 19 BRPM | WEIGHT: 293 LBS | HEART RATE: 93 BPM

## 2025-05-21 DIAGNOSIS — Z90.49 ACQUIRED ABSENCE OF OTHER SPECIFIED PARTS OF DIGESTIVE TRACT: Chronic | ICD-10-CM

## 2025-05-21 LAB
APPEARANCE UR: CLEAR — SIGNIFICANT CHANGE UP
BILIRUB UR-MCNC: NEGATIVE — SIGNIFICANT CHANGE UP
COLOR SPEC: YELLOW — SIGNIFICANT CHANGE UP
DIFF PNL FLD: NEGATIVE — SIGNIFICANT CHANGE UP
GLUCOSE UR QL: NEGATIVE MG/DL — SIGNIFICANT CHANGE UP
KETONES UR QL: NEGATIVE MG/DL — SIGNIFICANT CHANGE UP
LEUKOCYTE ESTERASE UR-ACNC: NEGATIVE — SIGNIFICANT CHANGE UP
NITRITE UR-MCNC: NEGATIVE — SIGNIFICANT CHANGE UP
PH UR: 7 — SIGNIFICANT CHANGE UP (ref 5–8)
PROT UR-MCNC: SIGNIFICANT CHANGE UP MG/DL
SP GR SPEC: 1.02 — SIGNIFICANT CHANGE UP (ref 1–1.03)
UROBILINOGEN FLD QL: 1 MG/DL — SIGNIFICANT CHANGE UP (ref 0.2–1)

## 2025-05-21 PROCEDURE — 99284 EMERGENCY DEPT VISIT MOD MDM: CPT | Mod: GC

## 2025-05-21 PROCEDURE — 76830 TRANSVAGINAL US NON-OB: CPT | Mod: 26

## 2025-05-21 RX ORDER — ACETAMINOPHEN 500 MG/5ML
1000 LIQUID (ML) ORAL ONCE
Refills: 0 | Status: DISCONTINUED | OUTPATIENT
Start: 2025-05-21 | End: 2025-05-22

## 2025-05-21 RX ORDER — OXYCODONE HYDROCHLORIDE 30 MG/1
5 TABLET ORAL ONCE
Refills: 0 | Status: DISCONTINUED | OUTPATIENT
Start: 2025-05-21 | End: 2025-05-21

## 2025-05-21 RX ORDER — METOCLOPRAMIDE HCL 10 MG
10 TABLET ORAL ONCE
Refills: 0 | Status: DISCONTINUED | OUTPATIENT
Start: 2025-05-21 | End: 2025-05-22

## 2025-05-21 RX ADMIN — OXYCODONE HYDROCHLORIDE 5 MILLIGRAM(S): 30 TABLET ORAL at 22:31

## 2025-05-21 RX ADMIN — OXYCODONE HYDROCHLORIDE 5 MILLIGRAM(S): 30 TABLET ORAL at 23:43

## 2025-05-22 VITALS
SYSTOLIC BLOOD PRESSURE: 140 MMHG | RESPIRATION RATE: 16 BRPM | OXYGEN SATURATION: 100 % | DIASTOLIC BLOOD PRESSURE: 91 MMHG | HEART RATE: 92 BPM | TEMPERATURE: 98 F

## 2025-05-22 LAB
ALBUMIN SERPL ELPH-MCNC: 4.2 G/DL — SIGNIFICANT CHANGE UP (ref 3.3–5)
ALP SERPL-CCNC: 72 U/L — SIGNIFICANT CHANGE UP (ref 40–120)
ALT FLD-CCNC: 30 U/L — SIGNIFICANT CHANGE UP (ref 4–33)
ANION GAP SERPL CALC-SCNC: 11 MMOL/L — SIGNIFICANT CHANGE UP (ref 7–14)
AST SERPL-CCNC: 20 U/L — SIGNIFICANT CHANGE UP (ref 4–32)
BASOPHILS # BLD AUTO: 0.01 K/UL — SIGNIFICANT CHANGE UP (ref 0–0.2)
BASOPHILS NFR BLD AUTO: 0.2 % — SIGNIFICANT CHANGE UP (ref 0–2)
BILIRUB SERPL-MCNC: 0.2 MG/DL — SIGNIFICANT CHANGE UP (ref 0.2–1.2)
BUN SERPL-MCNC: 10 MG/DL — SIGNIFICANT CHANGE UP (ref 7–23)
CALCIUM SERPL-MCNC: 9.5 MG/DL — SIGNIFICANT CHANGE UP (ref 8.4–10.5)
CHLORIDE SERPL-SCNC: 103 MMOL/L — SIGNIFICANT CHANGE UP (ref 98–107)
CO2 SERPL-SCNC: 23 MMOL/L — SIGNIFICANT CHANGE UP (ref 22–31)
CREAT SERPL-MCNC: 0.71 MG/DL — SIGNIFICANT CHANGE UP (ref 0.5–1.3)
EGFR: 112 ML/MIN/1.73M2 — SIGNIFICANT CHANGE UP
EGFR: 112 ML/MIN/1.73M2 — SIGNIFICANT CHANGE UP
EOSINOPHIL # BLD AUTO: 0.01 K/UL — SIGNIFICANT CHANGE UP (ref 0–0.5)
EOSINOPHIL NFR BLD AUTO: 0.2 % — SIGNIFICANT CHANGE UP (ref 0–6)
GLUCOSE SERPL-MCNC: 86 MG/DL — SIGNIFICANT CHANGE UP (ref 70–99)
HCG SERPL-ACNC: <1 MIU/ML — SIGNIFICANT CHANGE UP
HCT VFR BLD CALC: 34.8 % — SIGNIFICANT CHANGE UP (ref 34.5–45)
HGB BLD-MCNC: 11 G/DL — LOW (ref 11.5–15.5)
IANC: 2.76 K/UL — SIGNIFICANT CHANGE UP (ref 1.8–7.4)
IMM GRANULOCYTES NFR BLD AUTO: 0.7 % — SIGNIFICANT CHANGE UP (ref 0–0.9)
LIDOCAIN IGE QN: 33 U/L — SIGNIFICANT CHANGE UP (ref 7–60)
LYMPHOCYTES # BLD AUTO: 2.15 K/UL — SIGNIFICANT CHANGE UP (ref 1–3.3)
LYMPHOCYTES # BLD AUTO: 39.1 % — SIGNIFICANT CHANGE UP (ref 13–44)
MCHC RBC-ENTMCNC: 30.3 PG — SIGNIFICANT CHANGE UP (ref 27–34)
MCHC RBC-ENTMCNC: 31.6 G/DL — LOW (ref 32–36)
MCV RBC AUTO: 95.9 FL — SIGNIFICANT CHANGE UP (ref 80–100)
MONOCYTES # BLD AUTO: 0.53 K/UL — SIGNIFICANT CHANGE UP (ref 0–0.9)
MONOCYTES NFR BLD AUTO: 9.6 % — SIGNIFICANT CHANGE UP (ref 2–14)
NEUTROPHILS # BLD AUTO: 2.76 K/UL — SIGNIFICANT CHANGE UP (ref 1.8–7.4)
NEUTROPHILS NFR BLD AUTO: 50.2 % — SIGNIFICANT CHANGE UP (ref 43–77)
NRBC # BLD AUTO: 0 K/UL — SIGNIFICANT CHANGE UP (ref 0–0)
NRBC # FLD: 0 K/UL — SIGNIFICANT CHANGE UP (ref 0–0)
NRBC BLD AUTO-RTO: 0 /100 WBCS — SIGNIFICANT CHANGE UP (ref 0–0)
PLATELET # BLD AUTO: 150 K/UL — SIGNIFICANT CHANGE UP (ref 150–400)
POTASSIUM SERPL-MCNC: 4.3 MMOL/L — SIGNIFICANT CHANGE UP (ref 3.5–5.3)
POTASSIUM SERPL-SCNC: 4.3 MMOL/L — SIGNIFICANT CHANGE UP (ref 3.5–5.3)
PROT SERPL-MCNC: 7.2 G/DL — SIGNIFICANT CHANGE UP (ref 6–8.3)
RBC # BLD: 3.63 M/UL — LOW (ref 3.8–5.2)
RBC # FLD: 16.1 % — HIGH (ref 10.3–14.5)
SODIUM SERPL-SCNC: 137 MMOL/L — SIGNIFICANT CHANGE UP (ref 135–145)
VALPROATE SERPL-MCNC: 92.4 UG/ML — SIGNIFICANT CHANGE UP (ref 50–100)
WBC # BLD: 5.5 K/UL — SIGNIFICANT CHANGE UP (ref 3.8–10.5)
WBC # FLD AUTO: 5.5 K/UL — SIGNIFICANT CHANGE UP (ref 3.8–10.5)

## 2025-05-22 RX ORDER — DIPHENHYDRAMINE HCL 12.5MG/5ML
25 ELIXIR ORAL ONCE
Refills: 0 | Status: COMPLETED | OUTPATIENT
Start: 2025-05-22 | End: 2025-05-22

## 2025-05-22 RX ORDER — METHOCARBAMOL 500 MG/1
750 TABLET, FILM COATED ORAL ONCE
Refills: 0 | Status: COMPLETED | OUTPATIENT
Start: 2025-05-22 | End: 2025-05-22

## 2025-05-22 RX ORDER — OXYCODONE HYDROCHLORIDE 30 MG/1
5 TABLET ORAL ONCE
Refills: 0 | Status: DISCONTINUED | OUTPATIENT
Start: 2025-05-22 | End: 2025-05-22

## 2025-05-22 RX ADMIN — METHOCARBAMOL 750 MILLIGRAM(S): 500 TABLET, FILM COATED ORAL at 01:56

## 2025-05-22 RX ADMIN — Medication 25 MILLIGRAM(S): at 01:56

## 2025-05-22 RX ADMIN — OXYCODONE HYDROCHLORIDE 5 MILLIGRAM(S): 30 TABLET ORAL at 01:07

## 2025-05-26 NOTE — ED PROVIDER NOTE - NS_EDPROVIDERDISPOUSERTYPE_ED_A_ED
Attending Attestation (For Attendings USE Only)...
Refer to the Assessment tab to view/cancel completed assessment.

## 2025-05-27 ENCOUNTER — EMERGENCY (EMERGENCY)
Facility: HOSPITAL | Age: 40
LOS: 1 days | End: 2025-05-27
Attending: STUDENT IN AN ORGANIZED HEALTH CARE EDUCATION/TRAINING PROGRAM | Admitting: STUDENT IN AN ORGANIZED HEALTH CARE EDUCATION/TRAINING PROGRAM
Payer: MEDICARE

## 2025-05-27 VITALS
DIASTOLIC BLOOD PRESSURE: 92 MMHG | OXYGEN SATURATION: 96 % | SYSTOLIC BLOOD PRESSURE: 129 MMHG | TEMPERATURE: 98 F | RESPIRATION RATE: 16 BRPM | HEART RATE: 93 BPM

## 2025-05-27 VITALS
WEIGHT: 293 LBS | OXYGEN SATURATION: 99 % | HEART RATE: 88 BPM | DIASTOLIC BLOOD PRESSURE: 87 MMHG | RESPIRATION RATE: 16 BRPM | HEIGHT: 68 IN | TEMPERATURE: 98 F | SYSTOLIC BLOOD PRESSURE: 124 MMHG

## 2025-05-27 DIAGNOSIS — Z90.49 ACQUIRED ABSENCE OF OTHER SPECIFIED PARTS OF DIGESTIVE TRACT: Chronic | ICD-10-CM

## 2025-05-27 LAB
ALBUMIN SERPL ELPH-MCNC: 4.1 G/DL — SIGNIFICANT CHANGE UP (ref 3.3–5)
ALP SERPL-CCNC: 67 U/L — SIGNIFICANT CHANGE UP (ref 40–120)
ALT FLD-CCNC: 41 U/L — HIGH (ref 4–33)
ANION GAP SERPL CALC-SCNC: 12 MMOL/L — SIGNIFICANT CHANGE UP (ref 7–14)
AST SERPL-CCNC: 22 U/L — SIGNIFICANT CHANGE UP (ref 4–32)
BASOPHILS # BLD AUTO: 0.01 K/UL — SIGNIFICANT CHANGE UP (ref 0–0.2)
BASOPHILS NFR BLD AUTO: 0.2 % — SIGNIFICANT CHANGE UP (ref 0–2)
BILIRUB SERPL-MCNC: 0.2 MG/DL — SIGNIFICANT CHANGE UP (ref 0.2–1.2)
BUN SERPL-MCNC: 8 MG/DL — SIGNIFICANT CHANGE UP (ref 7–23)
CALCIUM SERPL-MCNC: 9.3 MG/DL — SIGNIFICANT CHANGE UP (ref 8.4–10.5)
CHLORIDE SERPL-SCNC: 103 MMOL/L — SIGNIFICANT CHANGE UP (ref 98–107)
CO2 SERPL-SCNC: 25 MMOL/L — SIGNIFICANT CHANGE UP (ref 22–31)
CREAT SERPL-MCNC: 0.61 MG/DL — SIGNIFICANT CHANGE UP (ref 0.5–1.3)
D DIMER BLD IA.RAPID-MCNC: 190 NG/ML DDU — SIGNIFICANT CHANGE UP
EGFR: 118 ML/MIN/1.73M2 — SIGNIFICANT CHANGE UP
EGFR: 118 ML/MIN/1.73M2 — SIGNIFICANT CHANGE UP
EOSINOPHIL # BLD AUTO: 0.01 K/UL — SIGNIFICANT CHANGE UP (ref 0–0.5)
EOSINOPHIL NFR BLD AUTO: 0.2 % — SIGNIFICANT CHANGE UP (ref 0–6)
GLUCOSE SERPL-MCNC: 96 MG/DL — SIGNIFICANT CHANGE UP (ref 70–99)
HCG SERPL-ACNC: <1 MIU/ML — SIGNIFICANT CHANGE UP
HCT VFR BLD CALC: 33.3 % — LOW (ref 34.5–45)
HGB BLD-MCNC: 10.9 G/DL — LOW (ref 11.5–15.5)
IANC: 3.08 K/UL — SIGNIFICANT CHANGE UP (ref 1.8–7.4)
IMM GRANULOCYTES NFR BLD AUTO: 0.2 % — SIGNIFICANT CHANGE UP (ref 0–0.9)
LYMPHOCYTES # BLD AUTO: 1.78 K/UL — SIGNIFICANT CHANGE UP (ref 1–3.3)
LYMPHOCYTES # BLD AUTO: 34.2 % — SIGNIFICANT CHANGE UP (ref 13–44)
MAGNESIUM SERPL-MCNC: 1.8 MG/DL — SIGNIFICANT CHANGE UP (ref 1.6–2.6)
MCHC RBC-ENTMCNC: 30.1 PG — SIGNIFICANT CHANGE UP (ref 27–34)
MCHC RBC-ENTMCNC: 32.7 G/DL — SIGNIFICANT CHANGE UP (ref 32–36)
MCV RBC AUTO: 92 FL — SIGNIFICANT CHANGE UP (ref 80–100)
MONOCYTES # BLD AUTO: 0.32 K/UL — SIGNIFICANT CHANGE UP (ref 0–0.9)
MONOCYTES NFR BLD AUTO: 6.1 % — SIGNIFICANT CHANGE UP (ref 2–14)
NEUTROPHILS # BLD AUTO: 3.08 K/UL — SIGNIFICANT CHANGE UP (ref 1.8–7.4)
NEUTROPHILS NFR BLD AUTO: 59.1 % — SIGNIFICANT CHANGE UP (ref 43–77)
NRBC # BLD AUTO: 0 K/UL — SIGNIFICANT CHANGE UP (ref 0–0)
NRBC # FLD: 0 K/UL — SIGNIFICANT CHANGE UP (ref 0–0)
NRBC BLD AUTO-RTO: 0 /100 WBCS — SIGNIFICANT CHANGE UP (ref 0–0)
NT-PROBNP SERPL-SCNC: 53 PG/ML — SIGNIFICANT CHANGE UP
PLATELET # BLD AUTO: 115 K/UL — LOW (ref 150–400)
POTASSIUM SERPL-MCNC: 4.1 MMOL/L — SIGNIFICANT CHANGE UP (ref 3.5–5.3)
POTASSIUM SERPL-SCNC: 4.1 MMOL/L — SIGNIFICANT CHANGE UP (ref 3.5–5.3)
PROT SERPL-MCNC: 7.5 G/DL — SIGNIFICANT CHANGE UP (ref 6–8.3)
RBC # BLD: 3.62 M/UL — LOW (ref 3.8–5.2)
RBC # FLD: 15.9 % — HIGH (ref 10.3–14.5)
SODIUM SERPL-SCNC: 140 MMOL/L — SIGNIFICANT CHANGE UP (ref 135–145)
TROPONIN T, HIGH SENSITIVITY RESULT: <6 NG/L — SIGNIFICANT CHANGE UP
WBC # BLD: 5.21 K/UL — SIGNIFICANT CHANGE UP (ref 3.8–10.5)
WBC # FLD AUTO: 5.21 K/UL — SIGNIFICANT CHANGE UP (ref 3.8–10.5)

## 2025-05-27 PROCEDURE — 99285 EMERGENCY DEPT VISIT HI MDM: CPT | Mod: GC

## 2025-05-27 PROCEDURE — 36410 VNPNXR 3YR/> PHY/QHP DX/THER: CPT

## 2025-05-27 PROCEDURE — 93010 ELECTROCARDIOGRAM REPORT: CPT

## 2025-05-27 PROCEDURE — 71046 X-RAY EXAM CHEST 2 VIEWS: CPT | Mod: 26

## 2025-05-27 RX ORDER — HYDROXYZINE HYDROCHLORIDE 25 MG/1
50 TABLET, FILM COATED ORAL ONCE
Refills: 0 | Status: COMPLETED | OUTPATIENT
Start: 2025-05-27 | End: 2025-05-27

## 2025-05-27 RX ORDER — ACETAMINOPHEN 500 MG/5ML
1000 LIQUID (ML) ORAL ONCE
Refills: 0 | Status: COMPLETED | OUTPATIENT
Start: 2025-05-27 | End: 2025-05-27

## 2025-05-27 RX ADMIN — Medication 400 MILLIGRAM(S): at 21:05

## 2025-05-27 RX ADMIN — HYDROXYZINE HYDROCHLORIDE 50 MILLIGRAM(S): 25 TABLET, FILM COATED ORAL at 23:18

## 2025-05-27 RX ADMIN — Medication 1000 MILLIGRAM(S): at 21:24

## 2025-05-27 NOTE — ED PROVIDER NOTE - IV ALTEPLASE EXCL ABS HIDDEN
Location: [facility]  The name of the procedure: Colonoscopy   Provider scheduled with: Dr. Rodriguez   Date 4/29/19, Time 8:15am  Pharmacy Name [facility] , and Fax #: [phone]  Prep Questions: Pt states he does not want to be sedated and that he spoke to his PCP about this as well.              show

## 2025-05-27 NOTE — ED ADULT NURSE NOTE - NSFALLUNIVINTERV_ED_ALL_ED
Bed/Stretcher in lowest position, wheels locked, appropriate side rails in place/Call bell, personal items and telephone in reach/Instruct patient to call for assistance before getting out of bed/chair/stretcher/Non-slip footwear applied when patient is off stretcher/Shell to call system/Physically safe environment - no spills, clutter or unnecessary equipment/Purposeful proactive rounding/Room/bathroom lighting operational, light cord in reach

## 2025-05-27 NOTE — ED PROVIDER NOTE - NSFOLLOWUPINSTRUCTIONS_ED_ALL_ED_FT
You were seen in the emergency department for chest pain. We have evaluated you and determined that you do not require further hospital interventions.    During your stay you had the following relevant results: Normal test results including EKG, chest XR, Troponin and CT scan of the chest.    Please follow up with your primary care provider as necessary to discuss the results of your stay in our department.    If you start to experience worsening symptoms such as worsening chest pain, shortness of breath, nausea, vomiting, lightheadedness, dizziness, or if you pass out, please return to the emergency department for further evaluation.    Nonspecific Chest Pain, Adult  Chest pain is an uncomfortable, tight, or painful feeling in the chest. The pain can feel like a crushing, aching, or squeezing pressure. A person can feel a burning or tingling sensation. Chest pain can also be felt in your back, neck, jaw, shoulder, or arm. This pain can be worse when you move, sneeze, or take a deep breath.    Chest pain can be caused by a condition that is life-threatening. This must be treated right away. It can also be caused by something that is not life-threatening. If you have chest pain, it can be hard to know the difference, so it is important to get help right away to make sure that you do not have a serious condition.    Some life-threatening causes of chest pain include:  Heart attack.  A tear in the body's main blood vessel (aortic dissection).  Inflammation around your heart (pericarditis).  A problem in the lungs, such as a blood clot (pulmonary embolism) or a collapsed lung (pneumothorax).  Some non life-threatening causes of chest pain include:  Heartburn.  Anxiety or stress.  Damage to the bones, muscles, and cartilage that make up your chest wall.  Pneumonia or bronchitis.  Shingles infection (varicella-zoster virus).  Your chest pain may come and go. It may also be constant. Your health care provider will do tests and other studies to find the cause of your pain. Treatment will depend on the cause of your chest pain.    Follow these instructions at home:    Medicines    Take over-the-counter and prescription medicines only as told by your health care provider.  If you were prescribed an antibiotic medicine, take it as told by your health care provider. Do not stop taking the antibiotic even if you start to feel better.    Activity    Avoid any activities that cause chest pain.  Do not lift anything that is heavier than 10 lb (4.5 kg), or the limit that you are told, until your health care provider says that it is safe.  Rest as directed by your health care provider.  Return to your normal activities only as told by your health care provider. Ask your health care provider what activities are safe for you.    Lifestyle    A plate along with examples of foods in a healthy diet.  Silhouette of a person sitting on the floor doing yoga.  Do not use any products that contain nicotine or tobacco, such as cigarettes, e-cigarettes, and chewing tobacco. If you need help quitting, ask your health care provider.  Do not drink alcohol.  Make healthy lifestyle changes as recommended. These may include:  Getting regular exercise. Ask your health care provider to suggest some exercises that are safe for you.  Eating a heart-healthy diet. This includes plenty of fresh fruits and vegetables, whole grains, low-fat (lean) protein, and low-fat dairy products. A dietitian can help you find healthy eating options.  Maintaining a healthy weight.  Managing any other health conditions you may have, such as high blood pressure (hypertension) or diabetes.  Reducing stress, such as with yoga or relaxation techniques.    General instructions    Pay attention to any changes in your symptoms. It is up to you to get the results of any tests that were done. Ask your health care provider, or the department that is doing the tests, when your results will be ready. Keep all follow-up visits as told by your health care provider. This is important. You may be asked to go for further testing if your chest pain does not go away.    Contact a health care provider if:  Your chest pain does not go away.  You feel depressed.  You have a fever.  You notice changes in your symptoms or develop new symptoms.    Get help right away if:  Your chest pain gets worse.  You have a cough that gets worse, or you cough up blood.  You have severe pain in your abdomen.  You faint.  You have sudden, unexplained chest discomfort.  You have sudden, unexplained discomfort in your arms, back, neck, or jaw.  You have shortness of breath at any time.  You suddenly start to sweat, or your skin gets clammy.  You feel nausea or you vomit.  You suddenly feel lightheaded or dizzy.  You have severe weakness, or unexplained weakness or fatigue.  Your heart begins to beat quickly, or it feels like it is skipping beats.  These symptoms may represent a serious problem that is an emergency. Do not wait to see if the symptoms will go away. Get medical help right away. Call your local emergency services (911 in the [place] Do not drive yourself to the hospital.    Summary    Chest pain can be caused by a condition that is serious and requires urgent treatment. It may also be caused by something that is not life-threatening.  Your health care provider may do lab tests and other studies to find the cause of your pain.  Follow your health care provider's instructions on taking medicines, making lifestyle changes, and getting emergency treatment if symptoms become worse.  Keep all follow-up visits as told by your health care provider. This includes visits for any further testing if your chest pain does not go away.    This information is not intended to replace advice given to you by your health care provider. Make sure you discuss any questions you have with your health care provider.

## 2025-05-27 NOTE — ED ADULT NURSE REASSESSMENT NOTE - NS ED NURSE REASSESS COMMENT FT1
Received report from ALEYDA Hart; pt in bed awake with respirations easy and unlabored on RA; denies SOB and chest pain. Pt c/o generalized body pain; medicated as ordered. Pt reports relief of body pain. pt came back from CT scan; awaiting result. Needs attended to. Care ongoing.

## 2025-05-27 NOTE — ED PROVIDER NOTE - CLINICAL SUMMARY MEDICAL DECISION MAKING FREE TEXT BOX
HPI:  38 y/o F, complex care note reviewed for majority of PMHx, affective dysregulation/intermittent explosive disorder, remote cocaine use disorder, prior SI requiring psychiatric admissions, Factor V Leiden, PNES, DVT and PE in past, CAD, mixed-IBS, GERD, PID, endometriosis, asthma who presents with 2 days of left sternal chest pain that occasionally radiates to the back. She describes her pain as sharp, pleuritic only occurring while taking deep breaths. She does not have any shortness of breath or JOSE. She has had a history of DVT and PE in past. Upon ROS, she also mentions URI symptoms that resolved a week ago, and reports chronic RLQ pain as well as few days of diarrhea but these symptoms are not new for the patient. Of note, she was recently evaluated less than a week ago for abdominal pain and discharged after workup found free fluid from likely ruptured cyst. No fevers, chills, cough, rhinorrhea, congestion.    PE:   Vital signs reviewed.  GENERAL: No acute distress, non toxic-appearing  HEAD: Atraumatic, normocephalic  EARS: Externally normal, atraumatic  EYES: No jaundice, not injected, no rupture, no foreign bodies  MOUTH: Moist mucous membranes  NECK: No swelling, no lymphadenopathy  HEART: Regular rate and rhythm, normal S1/S2, no murmurs, no rubs, no gallops  LUNGS: Clear to auscultation bilaterally without rhonchi, rales, or wheezing  ABDOMEN: Soft, non-distended, and non tender in all 4 quadrants  EXTREMITIES: No gross deformities  VASCULAR: Pulses palpable in all extremities, no pitting edema, capillary refill <2 secs  SKIN: Grossly intact without rash or open wounds  PSYCH: Alert and oriented x 3  NEURO: Strength 5/5 and sensation intact in all 4 extremities.     A/P:   38 y/o F w/ Hx as above who presents with pleuritic left chest pain that occasionally radiates to the back over the last 2 days. Vitals unremarkable, exam without significant findings. Hx of DVT/PE. Recent viral URI that self resolved 1 week ago. Concern for PE vs pericarditis vs unlikely ACS.  - labs including troponin and pro-BNP  - CXR  - CTA PE

## 2025-05-27 NOTE — PROVIDER CONTACT NOTE (OTHER) - ASSESSMENT
Called residence [phone] and spoke with staff member Breanna CANCINO, who confirmed that pt is a resident and can return.  [facility] pt can travel independently via taxi.  Discussed with provider, who is aware and in agreement with the plan.

## 2025-05-27 NOTE — ED ADULT TRIAGE NOTE - CHIEF COMPLAINT QUOTE
Pt brought in by EMS from home complaining of abdominal pain, nausea, diarrhea and chest pain. pt denies sob, fever or chills.

## 2025-05-27 NOTE — ED PROVIDER NOTE - ATTENDING CONTRIBUTION TO CARE
39-year-old female with a complex care note which was reviewed by me, affected the circulation/intermittent explosive disorder, remote cocaine use, prior SI, factor V Leiden, PE DVT on Eliquis with compliance, CAD, GERD, PAD, endometriosis who presents to ED complaining of left-sided sternal chest pain that occasionally radiates to the back.  Patient describes it as pleuritic in nature.  She does endorses mild shortness of breath.  Denies any leg swelling, fever chills diaphoresis, numbness, weakness, recent cocaine use.  Patient also endorses chronic right lower quadrant abdominal pain.    Physical exam:  Overall well-appearing female in no acute distress  Heart is regular rate rhythm without murmurs or gallops  Lungs are clear to auscultation with without wheezing rales or rhonchi  Abdomen is soft, nontender, nondistended  2+ PT DP and radial pulses  Gait is intact  No focal deficits  Chest wall tender to palpation    MDM:  Patient presents to the ED due to chest pain.  Vital signs are within stable range.    EKG is nonischemic.    I suspect patient's pain is likely MSK in nature.  However will rule out ACS, PE, pneumonia.    Given that EKG is nonischemic and patient's troponin is less than 6 I have a low suspicion for ACS.  D-dimer negative I do not suspect pulmonary embolism.    Patient has stable anemia.  No leukocytosis.  Normal electrolytes.  Chest x-ray without infiltrate.    For pain patient was given Ofirmev which did help.    Regarding patient's abdominal pain patient was seen on May 21, 2025 which an ultrasound which showed a mild complex fluid in the right adnexa, ruptured.  Given that today she does not have any acute abdominal findings no indication for further imaging.  Additionally do not suspect ovarian torsion given that her pain is not waxing and waning in nature and it is her chronic abdominal pain.  Next  Patient discharged with outpatient follow-up to primary care as needed.

## 2025-05-27 NOTE — PROVIDER CONTACT NOTE (OTHER) - ACTION/TREATMENT ORDERED:
Arranged a taxi via [facility]-assigned to Samy [phone]; invoice # [id number].

## 2025-05-27 NOTE — ED PROVIDER NOTE - PROGRESS NOTE DETAILS
PA Smart[employer]: d-dimer is negative, trop is negative. patient inform of findings, SW consulted for discharged arrangements.

## 2025-05-27 NOTE — ED ADULT NURSE NOTE - OBJECTIVE STATEMENT
Received to 24 a, axo x4, GCS 15, ambulatory; 39 y F hx schizoaffective, HTN, GERD, DVT and PE, p/w abdominal pain, diarrhea, and chest pain associated with nausea; EKG completed and reviewed by provider. Pt breathing even and unlabored. Symmetrical chest expansions, denies recent travel or sick exposure; no fevers/chills, cough, sob. Pt needs USGIV, provider made aware. Safety is maintained. Vitally stable

## 2025-05-27 NOTE — ED PROVIDER NOTE - PATIENT PORTAL LINK FT
You can access the FollowMyHealth Patient Portal offered by [facility] by registering at the following website: [email or web address]. By joining [employer]’s FollowMyHealth portal, you will also be able to view your health information using other applications (apps) compatible with our system.

## 2025-06-03 NOTE — CHART NOTE - NSCHARTNOTEFT_GEN_A_CORE
6/3/2025   Michael Soto  1952    The patients PMH, surgical history, family history, medications, allergies were all reviewed and updated as appropriate today.     Current Outpatient Medications on File Prior to Visit   Medication Sig Dispense Refill    niacinamide 500 MG tablet Take 1 tablet by mouth 2 times daily (with meals)      atorvastatin (LIPITOR) 10 MG tablet Take 1 tablet by mouth daily 90 tablet 1    aspirin 81 MG EC tablet Take 1 tablet by mouth daily 30 tablet 5    levothyroxine (SYNTHROID) 75 MCG tablet TAKE 1 TABLET BY MOUTH DAILY 90 tablet 3    Cholecalciferol (VITAMIN D-3) 25 MCG (1000 UT) CAPS       vitamin B-12 (CYANOCOBALAMIN) 1000 MCG tablet       Multiple Vitamins-Minerals (CENTRUM SILVER 50+MEN PO) Take 1 tablet by mouth daily      PREBIOTIC PRODUCT PO Take by mouth      S-Adenosylmethionine (ELLY-E) 400 MG TABS Take by mouth daily      TURMERIC CURCUMIN PO Take by mouth daily      Coenzyme Q10 200 MG TABS Take by mouth daily      vitamin C (ASCORBIC ACID) 500 MG tablet Take 2 tablets by mouth daily      Omega-3 Fatty Acids (FISH OIL ULTRA) 1400 MG CAPS Take by mouth daily       No current facility-administered medications on file prior to visit.        Chief Complaint   Patient presents with    Discuss Labs     Per pt here to discuss his lab results and on his six months follow up.        HPI:  pt refuses AMW visit today, wants to do it at next visit in 6 months  Just wants recent labs reviewed    Review of Systems    OBJECTIVE:  /82   Pulse 61   Wt 87.9 kg (193 lb 12.8 oz)   SpO2 98%   BMI 26.28 kg/m²    Wt Readings from Last 3 Encounters:   06/03/25 87.9 kg (193 lb 12.8 oz)   12/03/24 86.7 kg (191 lb 3.2 oz)   05/28/24 87.2 kg (192 lb 3.2 oz)       Physical Exam  Vitals and nursing note reviewed.   Constitutional:       General: He is not in acute distress.     Appearance: He is well-developed.      Comments: /82   Pulse 61   Wt 87.9 kg (193 lb 12.8 oz)   SpO2 98%   OVERNIGHT MEDICINE ACP COVERAGE    Was notified by ED RN that pt was getting increasinly OVERNIGHT MEDICINE ACP COVERAGE    Was notified by ED RN that pt was getting increasingly agitated attempting to elope. Pt is on standing thorazine 50mg PO 4x day and per psych recs can give haldol 2.5mg prn with additional 2.5mg for aggression/agitation. Pt was due for bedtime PO thorazine however not willing to take po at that time.   Thorazine 50mg IM x1 and haldol 2.5mg IVP x1 ordered stat and administered.    RRT was then called by nursing for agitation. Pt clinically stable upon my arrival to bedside with the RRT team and trisha. Pt noted to be sitting in bed crying because she was given an IM dose. Pt wants to go upstairs to floor. Called bedboard, slotted for 7N 723A. Demanding morphine and benadryl prior to being moved. Pt has listed allergy to morphine however has received multiple doses in the past without any documented reaction. Morphine 0.5mg IVP x1 ordered with 25mg ivp benadryl. Pt then allowed to be escorted to 7N by writer, AREJIN, security and PCA.     Signout given to 7N floor provider for continuation of care.     ABILIO Cole PA-C  [id number]

## 2025-06-03 NOTE — ED PROVIDER NOTE - INTERNATIONAL TRAVEL
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  Reports largest baby around 7#     Testing Indicated: Yes  Scheduled with Front Office Staff - Pt notified: Yes

## 2025-06-04 NOTE — ED ADULT NURSE NOTE - SUICIDE SCREENING QUESTION 3
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 who presents for pre-operative H&P in preparation for the following procedure:    Procedure Information       Case: [id number] Date/Time: 06/19/25 0730    Procedure: Intraoperative  MAGNETIC RESONANCE IMAGING, PEDIATRIC Stealth assisted Re do Left frontal craniotomy for tumor resection (Left: Head)    Anesthesia type: General    Diagnosis:       Brain tumor (H) [D49.6]      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]    Pre-op diagnosis:       Brain tumor (H) [D49.6]      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]    [facility]    Providers: Giselle Herman MD            Historian:  An  service was not used for this visit  History is obtained from the patient's parent(s)  Does patient have a legal guardian other than natural or adopted parents: No    Chart review:  Out of system record review process: Care Everywhere    Past Medical History:  Past Medical History:   Diagnosis Date    Premature baby     33 weeks    Seizures (H)     Seizures (H) 01/03/2024       Past Surgical History:  Past Surgical History:   Procedure Laterality Date    ANESTHESIA OUT OF OR MRI N/A 1/16/2024    Procedure: 3T MRI of Brain @ 1000;  Surgeon: GENERIC ANESTHESIA PROVIDER;  Location: [facility] OR    ANESTHESIA OUT OF OR MRI 1.5T N/A 9/24/2024    Procedure: 1.5T MRI brain;  Surgeon: GENERIC ANESTHESIA PROVIDER;  [facility]: [facility]ED[facility]     ANESTHESIA OUT OF OR MRI 1.5T N/A 3/25/2025    Procedure: 1.5T MRI brain;  Surgeon: GENERIC ANESTHESIA PROVIDER;  Location: [facility]  PEDS SEDATION     ANESTHESIA OUT OF OR MRI 3T N/A 9/25/2023    Procedure: 3T MRI brain;  Surgeon: GENERIC ANESTHESIA PROVIDER;  [facility]: [facility]     ANESTHESIA OUT OF OR MRI 3T N/A 5/21/2024    Procedure: 3T MRI brain;  Surgeon: GENERIC ANESTHESIA PROVIDER;  [facility]: [facility]     MRI CRANIOTOMY WITH OPTICAL TRACKING SYSTEM CHILD Left 11/8/2023    Procedure: Intraoperative Magnetic resonance imaging/Stealth Assisted Left Craniotomy, PEDIATRIC;  Surgeon: Giselle Herman MD;  [facility]       Prior to Admission medication:  Current Outpatient Medications   Medication Sig Dispense Refill    Melatonin 2.5 MG CHEW Take by mouth. PRN         Allergies:   No Known Allergies    Social History:  Social History     Socioeconomic History    Marital status: Single     Spouse name: Not on file    Number of children: Not on file    Years of education: Not on file    Highest education level: Not on file   Occupational History    Not on file   Tobacco Use    Smoking status: Never     Passive exposure: Yes    Smokeless tobacco: Never    Tobacco comments:     mom smokes outside   Vaping Use    Vaping status: Never Used   Substance and Sexual Activity    Alcohol use: Not on file    Drug use: Not on file    Sexual activity: Not on file   Other Topics Concern    Not on file   Social History Narrative    Not on file     Social Drivers of Health     Financial Resource Strain: Not on file   Food Insecurity: Low Risk  (9/10/2024)    Food Insecurity     Within the past 12 months, did you worry that your food would run out before you got money to buy more?: No     Within the past 12 months, did the food you bought just not last and you didn t have money to get more?: No   Transportation Needs: Low Risk  (9/10/2024)    Transportation Needs     Within the past 12 months, has lack of transportation kept you from medical appointments, getting your medicines, non-medical meetings or appointments, work, or from  "getting things that you need?: No   Physical Activity: Sufficiently Active (9/10/2024)    Exercise Vital Sign     Days of Exercise per Week: 6 days     Minutes of Exercise per Session: 60 min   Stress: Not on file   Interpersonal Safety: Low Risk  (3/25/2025)    Interpersonal Safety     Do you feel physically and emotionally safe where you currently live?: Yes     Within the past 12 months, have you been hit, slapped, kicked or otherwise physically hurt by someone?: No     Within the past 12 months, have you been humiliated or emotionally abused in other ways by your partner or ex-partner?: No   Housing Stability: Low Risk  (9/10/2024)    Housing Stability     Do you have housing? : Yes     Are you worried about losing your housing?: No       Family history  Family History   Problem Relation Age of Onset    Uterine Cancer Maternal Grandmother     Hypertension Maternal Grandfather     Hyperlipidemia Maternal Grandfather     Diabetes Paternal Grandmother     Coronary Artery Disease No family hx of     Cerebrovascular Disease No family hx of        Preop Vitals  BP Readings from Last 3 Encounters:   03/25/25 115/53 (87%, Z = 1.13 /  26%, Z = -0.64)*   03/25/25 (!) 121/63 (95%, Z = 1.64 /  58%, Z = 0.20)*   03/12/25 100/60 (35%, Z = -0.39 /  49%, Z = -0.03)*     *BP percentiles are based on the 2017 AAP Clinical Practice Guideline for boys    Pulse Readings from Last 3 Encounters:   03/25/25 109   03/25/25 109   03/12/25 79      Resp Readings from Last 3 Encounters:   04/22/25 24   03/25/25 (!) 18   03/25/25 (!) 18    SpO2 Readings from Last 3 Encounters:   03/25/25 100%   03/25/25 100%   03/12/25 98%      Temp Readings from Last 1 Encounters:   03/25/25 97.4  F (36.3  C) (Oral)    Ht Readings from Last 1 Encounters:   04/22/25 1.539 m (5' 0.59\") (41%, Z= -0.23)*     * Growth percentiles are based on [facility] (Boys, 2-20 Years) data.      Wt Readings from Last 1 Encounters:   04/22/25 45.1 kg (99 lb 6.8 oz) (49%, Z= -0.02)* " "    * Growth percentiles are based on CDC (Boys, 2-20 Years) data.    Estimated body mass index is 19.04 kg/m  as calculated from the following:    Height as of 4/22/25: 1.539 m (5' 0.59\").    Weight as of 4/22/25: 45.1 kg (99 lb 6.8 oz).     Imaging/Test Results:  MRI of the brain without and with intravenous contrast:  3/25/2025  10:35 AM   IMPRESSION: Likely tumor progression with increased size of the  enhancing nodule along the medial left frontal resection cavity and  adjacent FLAIR hyperintensity.    Anesthesia specific history:    Malignant hyperthermia (incl. family) No     Difficult airway/previous management   1/16/2024 3T MRI of Brain - - Attempted native airway, but despite preop Albuterol, suctioning copious amount of secretions from nares and attempt with nasal and oral airway, patient unable to tolerate native airway. Switched to LMA, tolerated well. Removed in PACU      Recent/Chronic respiratory infections No   Recent/Chronic airway or pulmonary conditions No   Exposure to tobacco smoke Yes: parents smoke in the garage.    Dependent on chronic respiratory support (O2, CPAP, tracheostomy, etc.) No   Risk factors for aspiration No     ANNAMARIE/SDB/STBUR: N/A   Snoring Frequency:  Snores MORE than 50% of the time (1)   Snoring Volume:  Patient snores softly (0)   Trouble Breathing: NO Trouble Breathing (0)   Observed apnea:  Apnea NOT observed (0)   Un-Refreshed:  Refreshed after sleep (0)    TOTAL: 1     RISK: Low     Anxiety/Agitation in medical settings No   Chronic pain (therapy) No       Gestational age at birth Gestational Age: 33w4d,    Complications at birth Spent about 10 days in the NICU      Previous difficult IV access No   Bleeding Disorders (incl. Family) Yes:     Paternal grandfather, clotting disorder, unknown     Maternal grandmother: some kind of clotting disorder, unknown     Corky has never been formally tested      PONV Risk Score   Age > 3 years:  Yes Where is the patient located? "   Procedure > 30 minutes: Yes   H/FH of POV/PONV/Motion sickness:  No   Strabismus surgery/Tonsillectomy:  No   TOTAL: 2  RISK: Medium       Anesthesia ROS  Anesthesia Evaluation    ROS/Med Hx    No history of anesthetic complications    Cardiovascular Findings - negative ROS    Neuro Findings   (+) developmental delaySeizures: h/o seizures, resolved.  Comments: L frontal parasagittal pylocytic astrocytoma, resected , with recurrence of tumor nodule along resection cavity    Pulmonary Findings - negative ROS  (-) recent URI    HENT Findings - negative HENT ROS       Findings   (+) prematurity (33 weeks)      GI/Hepatic/Renal Findings - negative ROS    Endocrine/Metabolic Findings - negative ROS      Genetic/Syndrome Findings - negative genetics/syndromes ROS    Hematology/Oncology Findings - negative hematology/oncology ROS    Additional Notes  In utero drug exposure       Physical EXAM:      PHYSICAL EXAM:   Mental Status/Neuro: A/A/O   Airway: Facies: Feasible  Mallampati: II  Mouth/Opening: Full  TM distance: Not Assessed  Neck ROM: Full   Respiratory: Auscultation: CTAB     Resp. Rate: Normal     Resp. Effort: Normal      CV: Rhythm: Regular  Rate: Age appropriate  Heart: Normal Sounds  Edema: None   Comments:      Dental: Normal Dentition Habitus: Normal  Bowel sounds: Normal  Abd. Exam: Normal  MSK: Normal  Skin: Normal  Injury: None           Assessment/Plan:   Corky Lo is a 15 year old male with a history of L frontal parasagittal pylocytic astrocytoma, resected in  with tumor recurrence who is being seen as a PAC referral for risk assessment, optimization and perioperative anesthesia approach planning.    ASA Score: 3    Expected Disposition after procedure: To ICU    Final anesthesia technique and considerations will be decided by anesthesiologist and anesthesia team taking care of the patient during the procedure. Based on chart review and today's conversation, we have the following  anesthesia related considerations and/or recommendations.     MH Precautions: No   Medications (other than allergies) to avoid:  N/A     Cardiovascular   Additional testing required: No  Elevated cardiac/hemodynamic risk: No     Respiratory/Airway   Additional testing required: No  Elevated pulmonary risk: No     Metabolic/Genetic/Endocrine/Glucose metabolism:   Special considerations for metabolic/mitochondrial disease  N/A   Steroid Stress dose recommended:  No   Candidate for glucose containing fluids:  Low concentration Glucose (2%): No  TPN/High concentration Glucose: No   Diabetic management discussed: N/A   Other: N/A     Hematology/Coagulation/Bleeding:   Elevated Bleeding Risk: No   Transfusion of blood products likely: No   Refusal of blood products: not discussed during this encounter    Other: Paternal grandfather, clotting disorder, unknown     Maternal grandmother: some kind of clotting disorder, unknown     Corky has never been formally tested      Neurologic/Psych/Development: history of seizures due to presence of a cortical mass lesion in the left superior frontal lobe. Underwent GTR on 11/8/23 and pathology was found to be consistent with a polocytic astrocytoma. RNA-fusion analysis fro BRAF fusion could not be completed due to insufficient sample. Unfortunately, MRI 3/25/25 shoed eveidence of recurrence. Neurosurgery anticipating resection of a good portion of the tumor, and obtain sample for molecular testing, which will guide future chemotherapy.    Not further seizure recurrences    Ear/Nose/Throat: N/A   Renal: N/A   Urogenital/OB/Gyn: N/A   GI/Hepatic: N/A   MSK/Derm: N/A       Final Assessment   Arrival time, NPO, shower and medication instructions provided by nursing staff today.  The patient is optimized and acceptable candidate for proposed procedure.  The following steps need to be undertaken to clear the patient for the proposed procedure from an anesthesia perspective:  N/A  "    Procedure day plan   High anxiety/agitation in medical setting  Corky said that he is terrified of needles, he was visibly anxious in clinic today.   Things that worked well or did NOT work well in the past  Dad says that he gives him a rubbery toy to chew on, he is terrified of needles   Not very vocal about his pain, so dad is concerned about post operative pain control.   Specific considerations at check-in  N/A  Child Family Life requested  No  Anxiolytic therapy planned/anticipated: Yes  See below    Airway management (special considerations)  Dad said that he was given a red cup of fluid likely midazolam prior to IV placment and Corky did not like the way that it made him feel. He described today that it made him feel \"fuzzy\"   Documented from previous MRI - 1/16/24:   Preprocedure anxiolysis  CFL was involved in preparation of the patient  Pre-Procedure anxiolysis was required.  Anxiolytic/Sedating meds prior to procedure:  Midazolam 20 mg, Enteral (PO/NG/OG/G-Tube)  Pre-Procedure interventions  were  adequate  Concerns included: Patient still acts very anxious around needles (jerks back when bringing equipment in) after Midazolam, but once starting the process he is very cooperative, if not thrilled. Father is an excellent resource to help him remain calm.   Induction/Maintenance/Emergence  Issues/Concerns included: comfortable emergence, saturating well on RA   Recommendations for the NEXT anesthesia encounter  Midazolam helpful for IV placement  Patient initially acts anxious around IV but really cooperates great when process is being started. Tolerated J-Tip well, holds arms till for insertion     1/16/2024 3T MRI of Brain - - Attempted native airway, but despite preop Albuterol, suctioning copious amount of secretions from nares and attempt with nasal and oral airway, patient unable to tolerate native airway. Switched to LMA, tolerated well. Removed in PACU   Family plans to bring home respiratory " equipment  N/A   Mari-procedural pain control considerations (home-meds, regional anesthesia, etc.)  N/A         On the day of service:  Prep time: 10 minutes  Visit time: 20 minutes  Documentation time: 15 minutes  ------------------------------------------  Total time: 45 minutes    Anne Mallory PA-C  Preoperative Assessment Center  [facility]  Office phone: [phone]  Fax: [phone]    Anesthesia Evaluation        No history of anesthetic complications       ROS/MED HX  ENT/Pulmonary:  - neg pulmonary ROS  (-) recent URI   Neurologic: Comment: L frontal parasagittal pylocytic astrocytoma, resected 2023, with recurrence of tumor nodule along resection cavity   Seizures: h/o seizures, resolved.   Cardiovascular:  - neg cardiovascular ROS     METS/Exercise Tolerance:     Hematologic:  - neg hematologic  ROS     Musculoskeletal:       GI/Hepatic:  - neg GI/hepatic ROS     Renal/Genitourinary:       Endo:  - neg endo ROS     Psychiatric/Substance Use:       Infectious Disease:       Malignancy:       Other:             No

## 2025-06-09 NOTE — ED PROVIDER NOTE - RESPIRATORY, MLM
Sees a hormone therapy specialist and is on progesterone and testosterone   Breath sounds clear and equal bilaterally.

## 2025-06-24 NOTE — ED ADULT NURSE NOTE - BEFAST SCREENING
Lab Results   Component Value Date    HGBA1C 6.6 (H) 06/18/2025       Recent Labs     06/24/25  0416 06/24/25  0554 06/24/25  0801 06/24/25  0956   POCGLU 143* 131 124 203*       Blood Sugar Average: Last 72 hrs:  (P) [id number]    Glucose controlled  Transition insulin infusion to subcutaneous insulin  Fingerstick blood sugar  Home glipizide and metformin on hold    Negative

## 2025-07-03 NOTE — ED ADULT NURSE NOTE - IN THE PAST 12 MONTHS HAVE YOU USED DRUGS OTHER THAN THOSE REQUIRED FOR MEDICAL REASON?
"Start tracking your foods with something like [employer] for portion control.    Read the attached information on weight loss and the Mediterranean diet.     Get the lab work done.    Focus on eating 3 medium sized meals with 2 snacks or 6 small meals throughout the day to keep your metabolism going.    Get 30 minutes of moderate cardiovascular activity per day.   Patient Education     Routine physical for adults   The Basics   Written by the doctors and editors at [facility]   What is a physical? -- A physical is a routine visit, or \"check-up,\" with your doctor. You might also hear it called a \"wellness visit\" or \"preventive visit.\"  During each visit, the doctor will:   Ask about your physical and mental health   Ask about your habits, behaviors, and lifestyle   Do an exam   Give you vaccines if needed   Talk to you about any medicines you take   Give advice about your health   Answer your questions  Getting regular check-ups is an important part of taking care of your health. It can help your doctor find and treat any problems you have. But it's also important for preventing health problems.  A routine physical is different from a \"sick visit.\" A sick visit is when you see a doctor because of a health concern or problem. Since physicals are scheduled ahead of time, you can think about what you want to ask the doctor.  How often should I get a physical? -- It depends on your age and health. In general, for people age 21 years and older:   If you are younger than 50 years, you might be able to get a physical every 3 years.   If you are 50 years or older, your doctor might recommend a physical every year.  If you have an ongoing health condition, like diabetes or high blood pressure, your doctor will probably want to see you more often.  What happens during a physical? -- In general, each visit will include:   Physical exam - The doctor or nurse will check your height, weight, heart rate, and blood pressure. They " "will also look at your eyes and ears. They will ask about how you are feeling and whether you have any symptoms that bother you.   Medicines - It's a good idea to bring a list of all the medicines you take to each doctor visit. Your doctor will talk to you about your medicines and answer any questions. Tell them if you are having any side effects that bother you. You should also tell them if you are having trouble paying for any of your medicines.   Habits and behaviors - This includes:   Your diet   Your exercise habits   Whether you smoke, drink alcohol, or use drugs   Whether you are sexually active   Whether you feel safe at home  Your doctor will talk to you about things you can do to improve your health and lower your risk of health problems. They will also offer help and support. For example, if you want to quit smoking, they can give you advice and might prescribe medicines. If you want to improve your diet or get more physical activity, they can help you with this, too.   Lab tests, if needed - The tests you get will depend on your age and situation. For example, your doctor might want to check your:   Cholesterol   Blood sugar   Iron level   Vaccines - The recommended vaccines will depend on your age, health, and what vaccines you already had. Vaccines are very important because they can prevent certain serious or deadly infections.   Discussion of screening - \"Screening\" means checking for diseases or other health problems before they cause symptoms. Your doctor can recommend screening based on your age, risk, and preferences. This might include tests to check for:   Cancer, such as breast, prostate, cervical, ovarian, colorectal, prostate, lung, or skin cancer   Sexually transmitted infections, such as chlamydia and gonorrhea   Mental health conditions like depression and anxiety  Your doctor will talk to you about the different types of screening tests. They can help you decide which screenings to have. " No They can also explain what the results might mean.   Answering questions - The physical is a good time to ask the doctor or nurse questions about your health. If needed, they can refer you to other doctors or specialists, too.  Adults older than 65 years often need other care, too. As you get older, your doctor will talk to you about:   How to prevent falling at home   Hearing or vision tests   Memory testing   How to take your medicines safely   Making sure that you have the help and support you need at home  All topics are updated as new evidence becomes available and our peer review process is complete.  This topic retrieved from [email or web address] on: May 02, 2024.  Topic 014104 Version 1.0  Release: 32.4.3 - C32.122  © 2024 UpToDate, Inc. and/or its affiliates. All rights reserved.  Consumer Information Use and Disclaimer   Disclaimer: This generalized information is a limited summary of diagnosis, treatment, and/or medication information. It is not meant to be comprehensive and should be used as a tool to help the user understand and/or assess potential diagnostic and treatment options. It does NOT include all information about conditions, treatments, medications, side effects, or risks that may apply to a specific patient. It is not intended to be medical advice or a substitute for the medical advice, diagnosis, or treatment of a health care provider based on the health care provider's examination and assessment of a patient's specific and unique circumstances. Patients must speak with a health care provider for complete information about their health, medical questions, and treatment options, including any risks or benefits regarding use of medications. This information does not endorse any treatments or medications as safe, effective, or approved for treating a specific patient. UpToDate, Inc. and its affiliates disclaim any warranty or liability relating to this information or the use thereof.The use of this  information is governed by the Terms of Use, available at https://www.wolterskluwer.com/en/know/clinical-effectiveness-terms. 2024© [employer] and its affiliates and/or licensors. All rights reserved.  Copyright   © 2024 [employer] and/or its affiliates. All rights reserved.

## 2025-07-10 NOTE — ED ADULT NURSE NOTE - FINAL NURSING ELECTRONIC SIGNATURE
Per secure message received today \"Good evening, I am BEBE Shepard following Carrie Vaughn,  49. I am notifying you she will be discharged on 7/10/25. She is on coumadin 2 mg daily. She will need to be contacted for follow up INR's and adjustments. Thank you\"    Will keep TE open for follow up tomorrow.    10-Sep-2024 01:17

## 2025-07-11 NOTE — ED ADULT NURSE REASSESSMENT NOTE - HEART RATE (BEATS/MIN)
83

## 2025-07-18 ENCOUNTER — EMERGENCY (EMERGENCY)
Facility: HOSPITAL | Age: 40
LOS: 1 days | End: 2025-07-18
Attending: EMERGENCY MEDICINE | Admitting: EMERGENCY MEDICINE
Payer: MEDICARE

## 2025-07-18 VITALS
SYSTOLIC BLOOD PRESSURE: 123 MMHG | HEART RATE: 104 BPM | OXYGEN SATURATION: 95 % | DIASTOLIC BLOOD PRESSURE: 86 MMHG | HEIGHT: 68 IN | WEIGHT: 210.1 LBS | TEMPERATURE: 99 F | RESPIRATION RATE: 18 BRPM

## 2025-07-18 DIAGNOSIS — Z90.49 ACQUIRED ABSENCE OF OTHER SPECIFIED PARTS OF DIGESTIVE TRACT: Chronic | ICD-10-CM

## 2025-07-18 LAB
ALBUMIN SERPL ELPH-MCNC: 4.1 G/DL — SIGNIFICANT CHANGE UP (ref 3.3–5)
ALP SERPL-CCNC: 70 U/L — SIGNIFICANT CHANGE UP (ref 40–120)
ALT FLD-CCNC: 30 U/L — SIGNIFICANT CHANGE UP (ref 4–33)
ANION GAP SERPL CALC-SCNC: 10 MMOL/L — SIGNIFICANT CHANGE UP (ref 7–14)
APPEARANCE UR: CLEAR — SIGNIFICANT CHANGE UP
AST SERPL-CCNC: 25 U/L — SIGNIFICANT CHANGE UP (ref 4–32)
BACTERIA # UR AUTO: ABNORMAL /HPF
BASOPHILS # BLD AUTO: 0.01 K/UL — SIGNIFICANT CHANGE UP (ref 0–0.2)
BASOPHILS NFR BLD AUTO: 0.2 % — SIGNIFICANT CHANGE UP (ref 0–2)
BILIRUB SERPL-MCNC: <0.2 MG/DL — SIGNIFICANT CHANGE UP (ref 0.2–1.2)
BILIRUB UR-MCNC: NEGATIVE — SIGNIFICANT CHANGE UP
BUN SERPL-MCNC: 12 MG/DL — SIGNIFICANT CHANGE UP (ref 7–23)
CALCIUM SERPL-MCNC: 9.2 MG/DL — SIGNIFICANT CHANGE UP (ref 8.4–10.5)
CAST: 0 /LPF — SIGNIFICANT CHANGE UP (ref 0–4)
CHLORIDE SERPL-SCNC: 104 MMOL/L — SIGNIFICANT CHANGE UP (ref 98–107)
CO2 SERPL-SCNC: 27 MMOL/L — SIGNIFICANT CHANGE UP (ref 22–31)
COLOR SPEC: YELLOW — SIGNIFICANT CHANGE UP
CREAT SERPL-MCNC: 0.73 MG/DL — SIGNIFICANT CHANGE UP (ref 0.5–1.3)
DIFF PNL FLD: ABNORMAL
EGFR: 108 ML/MIN/1.73M2 — SIGNIFICANT CHANGE UP
EGFR: 108 ML/MIN/1.73M2 — SIGNIFICANT CHANGE UP
EOSINOPHIL # BLD AUTO: 0.05 K/UL — SIGNIFICANT CHANGE UP (ref 0–0.5)
EOSINOPHIL NFR BLD AUTO: 1.1 % — SIGNIFICANT CHANGE UP (ref 0–6)
GLUCOSE SERPL-MCNC: 103 MG/DL — HIGH (ref 70–99)
GLUCOSE UR QL: NEGATIVE MG/DL — SIGNIFICANT CHANGE UP
HCG SERPL-ACNC: <1 MIU/ML — SIGNIFICANT CHANGE UP
HCT VFR BLD CALC: 33.8 % — LOW (ref 34.5–45)
HGB BLD-MCNC: 11.2 G/DL — LOW (ref 11.5–15.5)
IMM GRANULOCYTES # BLD AUTO: 0.03 K/UL — SIGNIFICANT CHANGE UP (ref 0–0.07)
IMM GRANULOCYTES NFR BLD AUTO: 0.7 % — SIGNIFICANT CHANGE UP (ref 0–0.9)
KETONES UR QL: NEGATIVE MG/DL — SIGNIFICANT CHANGE UP
LEUKOCYTE ESTERASE UR-ACNC: NEGATIVE — SIGNIFICANT CHANGE UP
LIDOCAIN IGE QN: 37 U/L — SIGNIFICANT CHANGE UP (ref 7–60)
LYMPHOCYTES # BLD AUTO: 1.9 K/UL — SIGNIFICANT CHANGE UP (ref 1–3.3)
LYMPHOCYTES NFR BLD AUTO: 42.2 % — SIGNIFICANT CHANGE UP (ref 13–44)
MCHC RBC-ENTMCNC: 31.1 PG — SIGNIFICANT CHANGE UP (ref 27–34)
MCHC RBC-ENTMCNC: 33.1 G/DL — SIGNIFICANT CHANGE UP (ref 32–36)
MCV RBC AUTO: 93.9 FL — SIGNIFICANT CHANGE UP (ref 80–100)
MONOCYTES # BLD AUTO: 0.5 K/UL — SIGNIFICANT CHANGE UP (ref 0–0.9)
MONOCYTES NFR BLD AUTO: 11.1 % — SIGNIFICANT CHANGE UP (ref 2–14)
NEUTROPHILS # BLD AUTO: 2.01 K/UL — SIGNIFICANT CHANGE UP (ref 1.8–7.4)
NEUTROPHILS NFR BLD AUTO: 44.7 % — SIGNIFICANT CHANGE UP (ref 43–77)
NITRITE UR-MCNC: NEGATIVE — SIGNIFICANT CHANGE UP
NRBC # BLD AUTO: 0 K/UL — SIGNIFICANT CHANGE UP (ref 0–0)
NRBC # FLD: 0 K/UL — SIGNIFICANT CHANGE UP (ref 0–0)
NRBC BLD AUTO-RTO: 0 /100 WBCS — SIGNIFICANT CHANGE UP (ref 0–0)
PH UR: 7 — SIGNIFICANT CHANGE UP (ref 5–8)
PLATELET # BLD AUTO: 120 K/UL — LOW (ref 150–400)
PMV BLD: 10.8 FL — SIGNIFICANT CHANGE UP (ref 7–13)
POTASSIUM SERPL-MCNC: 4.9 MMOL/L — SIGNIFICANT CHANGE UP (ref 3.5–5.3)
POTASSIUM SERPL-SCNC: 4.9 MMOL/L — SIGNIFICANT CHANGE UP (ref 3.5–5.3)
PROT SERPL-MCNC: 7.3 G/DL — SIGNIFICANT CHANGE UP (ref 6–8.3)
PROT UR-MCNC: NEGATIVE MG/DL — SIGNIFICANT CHANGE UP
RBC # BLD: 3.6 M/UL — LOW (ref 3.8–5.2)
RBC # FLD: 15.3 % — HIGH (ref 10.3–14.5)
RBC CASTS # UR COMP ASSIST: 267 /HPF — HIGH (ref 0–4)
SODIUM SERPL-SCNC: 141 MMOL/L — SIGNIFICANT CHANGE UP (ref 135–145)
SP GR SPEC: 1.01 — SIGNIFICANT CHANGE UP (ref 1–1.03)
SQUAMOUS # UR AUTO: 2 /HPF — SIGNIFICANT CHANGE UP (ref 0–5)
UROBILINOGEN FLD QL: 0.2 MG/DL — SIGNIFICANT CHANGE UP (ref 0.2–1)
WBC # BLD: 4.5 K/UL — SIGNIFICANT CHANGE UP (ref 3.8–10.5)
WBC # FLD AUTO: 4.5 K/UL — SIGNIFICANT CHANGE UP (ref 3.8–10.5)
WBC UR QL: 1 /HPF — SIGNIFICANT CHANGE UP (ref 0–5)

## 2025-07-18 PROCEDURE — 99284 EMERGENCY DEPT VISIT MOD MDM: CPT | Mod: GC

## 2025-07-18 PROCEDURE — 36410 VNPNXR 3YR/> PHY/QHP DX/THER: CPT

## 2025-07-18 PROCEDURE — 74176 CT ABD & PELVIS W/O CONTRAST: CPT | Mod: 26

## 2025-07-18 RX ORDER — DIPHENHYDRAMINE HCL 12.5MG/5ML
25 ELIXIR ORAL ONCE
Refills: 0 | Status: COMPLETED | OUTPATIENT
Start: 2025-07-18 | End: 2025-07-18

## 2025-07-18 RX ORDER — OXYCODONE HYDROCHLORIDE 30 MG/1
5 TABLET ORAL ONCE
Refills: 0 | Status: DISCONTINUED | OUTPATIENT
Start: 2025-07-18 | End: 2025-07-18

## 2025-07-18 RX ORDER — GABAPENTIN 400 MG/1
300 CAPSULE ORAL ONCE
Refills: 0 | Status: COMPLETED | OUTPATIENT
Start: 2025-07-18 | End: 2025-07-18

## 2025-07-18 RX ORDER — ACETAMINOPHEN 500 MG/5ML
1000 LIQUID (ML) ORAL ONCE
Refills: 0 | Status: COMPLETED | OUTPATIENT
Start: 2025-07-18 | End: 2025-07-18

## 2025-07-18 RX ORDER — METOCLOPRAMIDE HCL 10 MG
10 TABLET ORAL ONCE
Refills: 0 | Status: COMPLETED | OUTPATIENT
Start: 2025-07-18 | End: 2025-07-18

## 2025-07-18 RX ORDER — ACETAMINOPHEN 500 MG/5ML
975 LIQUID (ML) ORAL ONCE
Refills: 0 | Status: COMPLETED | OUTPATIENT
Start: 2025-07-18 | End: 2025-07-18

## 2025-07-18 RX ORDER — QUETIAPINE FUMARATE 25 MG/1
200 TABLET ORAL ONCE
Refills: 0 | Status: COMPLETED | OUTPATIENT
Start: 2025-07-18 | End: 2025-07-18

## 2025-07-18 RX ORDER — PHENAZOPYRIDINE HCL 100 MG
100 TABLET ORAL ONCE
Refills: 0 | Status: COMPLETED | OUTPATIENT
Start: 2025-07-18 | End: 2025-07-18

## 2025-07-18 RX ADMIN — Medication 10 MILLIGRAM(S): at 18:44

## 2025-07-18 RX ADMIN — Medication 975 MILLIGRAM(S): at 14:32

## 2025-07-18 RX ADMIN — GABAPENTIN 300 MILLIGRAM(S): 400 CAPSULE ORAL at 23:33

## 2025-07-18 RX ADMIN — OXYCODONE HYDROCHLORIDE 5 MILLIGRAM(S): 30 TABLET ORAL at 16:56

## 2025-07-18 RX ADMIN — Medication 975 MILLIGRAM(S): at 22:29

## 2025-07-18 RX ADMIN — Medication 25 MILLIGRAM(S): at 18:15

## 2025-07-18 RX ADMIN — Medication 100 MILLIGRAM(S): at 17:05

## 2025-07-18 RX ADMIN — OXYCODONE HYDROCHLORIDE 5 MILLIGRAM(S): 30 TABLET ORAL at 23:33

## 2025-07-18 NOTE — ED PROCEDURE NOTE - PROCEDURE ADDITIONAL DETAILS
Emergency Department Focused Ultrasound performed at patient's bedside for placement of ultrasound guided IV. The study was confirmed with blood return and ease of flushing saline.    Upper extremity laterality: R  IV Gauge: 20g

## 2025-07-18 NOTE — ED ADULT TRIAGE NOTE - CHIEF COMPLAINT QUOTE
Patient brought in by Ambulance from rehab facility, [place] for urinary symptoms. Patient states that she was seen at [facility] on Wednesday for a UTI and never received her antibiotics. Patient has c/o chills, suprapubic pain, blood in her urine and " orange urine". Phx of stroke in 2019, left sided residual weakness, bipolar, HLD, hypothyroidism , GERD  Patient on Pradaxa. Denies fever, falls, h/a, n/v. A/O x 4, NAD, RR even and unlabored.

## 2025-07-18 NOTE — ED PROVIDER NOTE - PATIENT PORTAL LINK FT
You can access the FollowMyHealth Patient Portal offered by [facility] by registering at the following website: [email or web address]. By joining [employer]’s FollowMyHealth portal, you will also be able to view your health information using other applications (apps) compatible with our system.

## 2025-07-18 NOTE — ED ADULT NURSE NOTE - CHIEF COMPLAINT QUOTE
Patient brought in by Ambulance from rehab facility, [facility] for urinary symptoms. Patient states that she was seen at [facility] on Wednesday for a UTI and never received her antibiotics. Patient has c/o chills, suprapubic pain, blood in her urine and " orange urine". Phx of stroke in 2019, left sided residual weakness, bipolar, HLD, hypothyroidism , GERD  Patient on Pradaxa. Denies fever, falls, h/a, n/v. A/O x 4, NAD, RR even and unlabored.

## 2025-07-18 NOTE — ED PROVIDER NOTE - ATTENDING CONTRIBUTION TO CARE
Dr. Dela Cruz: 39 yo female with bipolar disorder, endometriosis, Factor V Leiden, GERD, DVT on Eliquis, HTN, seizure disorder, in ED with 2 days of urinary frequency and hematuria, as well as dysuria and fatigue.  Pt states that 2 days ago she went to outside ED for this and was diagnosed with UTI, but not sent home on abx.  She continues to have symptoms and so came to ED today.  Also having nausea but no vomiting.  No CP/SOB or other symptoms.  On exam pt overall well appearing, in NAD, heart RRR, lungs CTAB, abd NTND, extremities without swelling, strength 5/5 in all extremities and skin without rash.  No CVAT bilaterally.    I, Pramod Dela Cruz MD, personally made/approved the management plan for this patient and take responsibility for the patient's management.

## 2025-07-18 NOTE — ED PROCEDURE NOTE - ATTENDING CONTRIBUTION TO CARE
Dr. Dela Cruz: I personally supervised this procedure performed by the resident and I agree with the above documentation.

## 2025-07-18 NOTE — ED PROVIDER NOTE - NSFOLLOWUPINSTRUCTIONS_ED_ALL_ED_FT
You were seen and evaluated in the Emergency Department. The results of your labs and CT scan are normal and are included in this packet.     Please follow up with your primary care provider in 1-2 days regarding today's visit.     Please return to the Emergency Department for worsening symptoms, fevers greater than 100.4, severe abdominal pain, unable to keep down food or liquids, chest pain, difficulty breathing, or any concerns. You have been seen and evaluated in the emergency department.  Your lab work was normal and the CT scan of your abdomen was normal as well.  The results of today's visit are included in this packet.    Please follow-up with your primary care provider in 1 to 2 days regarding today's visit.    Please return to the emergency department for worsening pain, fevers greater than 100.4, unable to keep down food or liquids, weakness, chest pain, difficulty breathing, or any concerns.

## 2025-07-18 NOTE — ED PROVIDER NOTE - TEST CONSIDERED BUT NOT PERFORMED
Tests Considered But Not Performed considered CT A/P, but pt reporting suprapubic pain but no focal suprapubic TTP on exam; low utility for CT in this case

## 2025-07-18 NOTE — ED PROVIDER NOTE - CLINICAL SUMMARY MEDICAL DECISION MAKING FREE TEXT BOX
40-year-old history of factor V Leiden, DVT and PE in the past, CAD, PID, endometriosis, asthma here for chief complaint of urinary symptoms for 5 days.  Physical exam significant for lower abdominal tenderness to palpation.  Differential includes hemorrhagic cystitis, UTI, endometriosis exacerbation, kidney stone.  Will obtain CBC, CMP, lipase, urine studies, analgesia with Tylenol.

## 2025-07-18 NOTE — ED ADULT NURSE NOTE - OBJECTIVE STATEMENT
Lot # (Optional): [id number] Stelara Amount: 45 mg Consent: The risks of pain and injection site reactions were reviewed with the patient prior to the injection. Detail Level: Simple Administered By (Optional): Radha Hannah Expiration Date (Optional): 01/2021 pt A&Ox4, ambulatory to ED from rehab facility ([place]) c/o hematuria since Monday. states she had orange urine since Wednesday. states today she woke up with dysuria, polyuria, bilateral flank pain and diffuse abd pain. endorses chills. denies chest pain, sob, fever, n/v, headache. LMP May 2025. hx of endometriosis, CVA in 2019 (at [place] for left sided residual weakness), bipolar, HLD, hypothyroidism , GERD, and "recent heart condition diagnosis that causes swelling in body."  respirations even and unlabored on room air. abd soft and nondistended. tender to palpation suprapubic and epigastric region. right hand edema noted. motor strength and sensory equal bilaterally. neuro intact. medicated as per orders. pending USIV. plan of care continued.

## 2025-07-18 NOTE — ED PROVIDER NOTE - PROGRESS NOTE DETAILS
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 Mild diffuse cerebral dysfunction, No epileptiform pattern or seizure observed.     ISSUE 1: recurrent admissions for “seizures” despite consistently negative Neurological work-up and documented “pseudoseizures” ; h/o feigning seizures for medical admission/avoid going back to residence. 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  Has chronic back pain, recommend setting a limit       ISSUE 3: when admitted, be aware she has a history of Code Greys: throwing objects, aggression/yelling/curing; posturing/physical intimidation when needs not met Nael: Patient signed out pending labs.  discussed hx with patient.  states left flank and hematuria started 5 days ago and now progressed.  will obtain CT.  Also discussed use of opiates in the past and during treatment.  Patient still with pain and would like to try percocet.  Non toxic.  Well appearing. Otherwise baseline. Teddy HOLDEN, PGY-3;  UA with evidence of red blood cells, no white blood cells, kidney function within normal limits.  Patient pending CT noncontrast to eval for nephrolithiasis Beena Monk PGY-1:  received pt at [facility] out. 40yF urinary freq, dysuria/hematuria, did not get abx at [facility], urine neg, pending CT to r/o stone. pt stable

## 2025-07-18 NOTE — ED PROVIDER NOTE - OBJECTIVE STATEMENT
September 11, 2023           Sean Cruz  [address]  [place] [address]        Dear Sean,    Our records indicate you are due for colon cancer screening. If you have already had your screening please let us know when and where the last colon cancer screening was done so we can update our records.      If you are due for colon cancer screening then we would be happy to help in getting this taken care of. Colon cancer is the third most common type of cancer and is the second most common cause of death from cancer in the [place]. A large portion of colon cancers are preventable with appropriate screening. Also, when colon cancer is found in earlier stages it has a much higher likelihood of being cured.    There are several options for colon cancer screening and each has benefits and downfalls so there is no one-size-fits-all test. Below you will find a list on the available tests:    Colonoscopy  Stool testing for blood (Fit Kit)  Stool DNA testing (Cologuard)     Please call [phone] to discuss the different tests and find which is best for you.    Thank you for your time and we look forward to working with you to keep you as healthy as possible in the future.     Sincerely,    Demetrius Matthews MD        
40-year-old history of factor V Leiden, DVT and PE in the past, CAD, PID, endometriosis, asthma here for chief complaint of urinary symptoms for 5 days.  Patient endorsing urinary frequency, dysuria, hematuria.  Patiently recently went to [facility] and was told she has infection however was not sent home on any antibiotics.  Patient also endorsing lower abdominal pain denies nausea, vomiting, stool changes, fevers, chills, chest pain, shortness of breath

## 2025-07-18 NOTE — ED PROVIDER NOTE - DIFFERENTIAL DIAGNOSIS
Differential Diagnosis UTI, pyelonephritis, musculoskeletal pain, dehydration, electrolyte abnormality

## 2025-07-18 NOTE — ED PROVIDER NOTE - PHYSICAL EXAMINATION
General: well appearing, interactive, well nourished, no apparent distress, ncat  HEENT: EOMI, PERRLA, normal mucosa, normal oropharynx, no lesions on the lips or on oral mucosa, normal external ear  Neck: supple, no lymphadenopathy, full range of motion, no nuchal rigidity  CV: RRR, normal S1 and S2 with no murmur, capillary refill less than two seconds  Resp: lungs CTA b/l, good aeration bilaterally, symmetric chest wall   Abd: non-distended, soft, lower abd tenderness to palpation  : no CVA tenderness  MSK: full range of motion, no cyanosis, no edema, no clubbing, no immobility  Neuro: CN II-XII grossly intact, muscle strength 5/5 in all extremities, normal gait  Skin: no rashes, skin intact

## 2025-07-19 ENCOUNTER — EMERGENCY (EMERGENCY)
Facility: HOSPITAL | Age: 40
LOS: 1 days | End: 2025-07-19
Attending: STUDENT IN AN ORGANIZED HEALTH CARE EDUCATION/TRAINING PROGRAM | Admitting: STUDENT IN AN ORGANIZED HEALTH CARE EDUCATION/TRAINING PROGRAM
Payer: MEDICARE

## 2025-07-19 ENCOUNTER — EMERGENCY (EMERGENCY)
Facility: HOSPITAL | Age: 40
LOS: 0 days | Discharge: HOME | End: 2025-07-19
Payer: MEDICARE

## 2025-07-19 VITALS
HEIGHT: 68 IN | HEART RATE: 97 BPM | TEMPERATURE: 98 F | OXYGEN SATURATION: 97 % | SYSTOLIC BLOOD PRESSURE: 118 MMHG | WEIGHT: 293 LBS | DIASTOLIC BLOOD PRESSURE: 82 MMHG | RESPIRATION RATE: 18 BRPM

## 2025-07-19 VITALS
HEART RATE: 100 BPM | OXYGEN SATURATION: 98 % | SYSTOLIC BLOOD PRESSURE: 122 MMHG | RESPIRATION RATE: 18 BRPM | DIASTOLIC BLOOD PRESSURE: 88 MMHG

## 2025-07-19 VITALS
HEIGHT: 68 IN | RESPIRATION RATE: 16 BRPM | DIASTOLIC BLOOD PRESSURE: 62 MMHG | HEART RATE: 99 BPM | OXYGEN SATURATION: 100 % | SYSTOLIC BLOOD PRESSURE: 166 MMHG | WEIGHT: 293 LBS | TEMPERATURE: 98 F

## 2025-07-19 VITALS
OXYGEN SATURATION: 97 % | HEART RATE: 94 BPM | SYSTOLIC BLOOD PRESSURE: 112 MMHG | TEMPERATURE: 98 F | RESPIRATION RATE: 16 BRPM | DIASTOLIC BLOOD PRESSURE: 84 MMHG

## 2025-07-19 DIAGNOSIS — Z88.5 ALLERGY STATUS TO NARCOTIC AGENT: ICD-10-CM

## 2025-07-19 DIAGNOSIS — Z86.718 PERSONAL HISTORY OF OTHER VENOUS THROMBOSIS AND EMBOLISM: ICD-10-CM

## 2025-07-19 DIAGNOSIS — Z88.8 ALLERGY STATUS TO OTHER DRUGS, MEDICAMENTS AND BIOLOGICAL SUBSTANCES: ICD-10-CM

## 2025-07-19 DIAGNOSIS — Z91.018 ALLERGY TO OTHER FOODS: ICD-10-CM

## 2025-07-19 DIAGNOSIS — Z90.49 ACQUIRED ABSENCE OF OTHER SPECIFIED PARTS OF DIGESTIVE TRACT: Chronic | ICD-10-CM

## 2025-07-19 DIAGNOSIS — R82.998 OTHER ABNORMAL FINDINGS IN URINE: ICD-10-CM

## 2025-07-19 DIAGNOSIS — Z88.0 ALLERGY STATUS TO PENICILLIN: ICD-10-CM

## 2025-07-19 DIAGNOSIS — I25.10 ATHEROSCLEROTIC HEART DISEASE OF NATIVE CORONARY ARTERY WITHOUT ANGINA PECTORIS: ICD-10-CM

## 2025-07-19 DIAGNOSIS — R10.30 LOWER ABDOMINAL PAIN, UNSPECIFIED: ICD-10-CM

## 2025-07-19 DIAGNOSIS — Z91.040 LATEX ALLERGY STATUS: ICD-10-CM

## 2025-07-19 DIAGNOSIS — D68.2 HEREDITARY DEFICIENCY OF OTHER CLOTTING FACTORS: ICD-10-CM

## 2025-07-19 DIAGNOSIS — Z88.6 ALLERGY STATUS TO ANALGESIC AGENT: ICD-10-CM

## 2025-07-19 DIAGNOSIS — Z86.711 PERSONAL HISTORY OF PULMONARY EMBOLISM: ICD-10-CM

## 2025-07-19 DIAGNOSIS — J45.909 UNSPECIFIED ASTHMA, UNCOMPLICATED: ICD-10-CM

## 2025-07-19 DIAGNOSIS — Z87.42 PERSONAL HISTORY OF OTHER DISEASES OF THE FEMALE GENITAL TRACT: ICD-10-CM

## 2025-07-19 DIAGNOSIS — N80.9 ENDOMETRIOSIS, UNSPECIFIED: ICD-10-CM

## 2025-07-19 LAB
ALBUMIN SERPL ELPH-MCNC: 3.5 G/DL — SIGNIFICANT CHANGE UP (ref 3.3–5)
ALP SERPL-CCNC: 70 U/L — SIGNIFICANT CHANGE UP (ref 40–120)
ALT FLD-CCNC: 49 U/L — SIGNIFICANT CHANGE UP (ref 12–78)
ANION GAP SERPL CALC-SCNC: 4 MMOL/L — LOW (ref 5–17)
APPEARANCE UR: ABNORMAL
AST SERPL-CCNC: 31 U/L — SIGNIFICANT CHANGE UP (ref 15–37)
BASOPHILS # BLD AUTO: 0.01 K/UL — SIGNIFICANT CHANGE UP (ref 0–0.2)
BASOPHILS NFR BLD AUTO: 0.2 % — SIGNIFICANT CHANGE UP (ref 0–2)
BILIRUB SERPL-MCNC: 0.3 MG/DL — SIGNIFICANT CHANGE UP (ref 0.2–1.2)
BILIRUB UR-MCNC: NEGATIVE — SIGNIFICANT CHANGE UP
BUN SERPL-MCNC: 15 MG/DL — SIGNIFICANT CHANGE UP (ref 7–23)
CALCIUM SERPL-MCNC: 9.6 MG/DL — SIGNIFICANT CHANGE UP (ref 8.5–10.1)
CHLORIDE SERPL-SCNC: 106 MMOL/L — SIGNIFICANT CHANGE UP (ref 96–108)
CO2 SERPL-SCNC: 30 MMOL/L — SIGNIFICANT CHANGE UP (ref 22–31)
COLOR SPEC: YELLOW — SIGNIFICANT CHANGE UP
CREAT SERPL-MCNC: 0.96 MG/DL — SIGNIFICANT CHANGE UP (ref 0.5–1.3)
DIFF PNL FLD: NEGATIVE — SIGNIFICANT CHANGE UP
EGFR: 78 ML/MIN/1.73M2 — SIGNIFICANT CHANGE UP
EGFR: 78 ML/MIN/1.73M2 — SIGNIFICANT CHANGE UP
EOSINOPHIL # BLD AUTO: 0.05 K/UL — SIGNIFICANT CHANGE UP (ref 0–0.5)
EOSINOPHIL NFR BLD AUTO: 1 % — SIGNIFICANT CHANGE UP (ref 0–6)
GLUCOSE SERPL-MCNC: 99 MG/DL — SIGNIFICANT CHANGE UP (ref 70–99)
GLUCOSE UR QL: NEGATIVE MG/DL — SIGNIFICANT CHANGE UP
HCG SERPL-ACNC: <1 MIU/ML — SIGNIFICANT CHANGE UP
HCT VFR BLD CALC: 35 % — SIGNIFICANT CHANGE UP (ref 34.5–45)
HGB BLD-MCNC: 11.5 G/DL — SIGNIFICANT CHANGE UP (ref 11.5–15.5)
IMM GRANULOCYTES NFR BLD AUTO: 0.2 % — SIGNIFICANT CHANGE UP (ref 0–0.9)
KETONES UR QL: NEGATIVE MG/DL — SIGNIFICANT CHANGE UP
LACTATE SERPL-SCNC: 1.2 MMOL/L — SIGNIFICANT CHANGE UP (ref 0.7–2)
LEUKOCYTE ESTERASE UR-ACNC: ABNORMAL
LIDOCAIN IGE QN: 34 U/L — SIGNIFICANT CHANGE UP (ref 13–75)
LYMPHOCYTES # BLD AUTO: 1.77 K/UL — SIGNIFICANT CHANGE UP (ref 1–3.3)
LYMPHOCYTES # BLD AUTO: 35.2 % — SIGNIFICANT CHANGE UP (ref 13–44)
MCHC RBC-ENTMCNC: 31.4 PG — SIGNIFICANT CHANGE UP (ref 27–34)
MCHC RBC-ENTMCNC: 32.9 G/DL — SIGNIFICANT CHANGE UP (ref 32–36)
MCV RBC AUTO: 95.6 FL — SIGNIFICANT CHANGE UP (ref 80–100)
MONOCYTES # BLD AUTO: 0.55 K/UL — SIGNIFICANT CHANGE UP (ref 0–0.9)
MONOCYTES NFR BLD AUTO: 10.9 % — SIGNIFICANT CHANGE UP (ref 2–14)
NEUTROPHILS # BLD AUTO: 2.64 K/UL — SIGNIFICANT CHANGE UP (ref 1.8–7.4)
NEUTROPHILS NFR BLD AUTO: 52.5 % — SIGNIFICANT CHANGE UP (ref 43–77)
NITRITE UR-MCNC: NEGATIVE — SIGNIFICANT CHANGE UP
NRBC BLD AUTO-RTO: 0 /100 WBCS — SIGNIFICANT CHANGE UP (ref 0–0)
PH UR: 5.5 — SIGNIFICANT CHANGE UP (ref 5–8)
PLATELET # BLD AUTO: 124 K/UL — LOW (ref 150–400)
POTASSIUM SERPL-MCNC: 4.6 MMOL/L — SIGNIFICANT CHANGE UP (ref 3.5–5.3)
POTASSIUM SERPL-SCNC: 4.6 MMOL/L — SIGNIFICANT CHANGE UP (ref 3.5–5.3)
PROT SERPL-MCNC: 7.6 GM/DL — SIGNIFICANT CHANGE UP (ref 6–8.3)
PROT UR-MCNC: NEGATIVE MG/DL — SIGNIFICANT CHANGE UP
RBC # BLD: 3.66 M/UL — LOW (ref 3.8–5.2)
RBC # FLD: 15.6 % — HIGH (ref 10.3–14.5)
SODIUM SERPL-SCNC: 140 MMOL/L — SIGNIFICANT CHANGE UP (ref 135–145)
SP GR SPEC: 1.02 — SIGNIFICANT CHANGE UP (ref 1–1.03)
UROBILINOGEN FLD QL: 1 MG/DL — SIGNIFICANT CHANGE UP (ref 0.2–1)
WBC # BLD: 5.03 K/UL — SIGNIFICANT CHANGE UP (ref 3.8–10.5)
WBC # FLD AUTO: 5.03 K/UL — SIGNIFICANT CHANGE UP (ref 3.8–10.5)

## 2025-07-19 PROCEDURE — 99284 EMERGENCY DEPT VISIT MOD MDM: CPT

## 2025-07-19 RX ORDER — DIPHENHYDRAMINE HCL 12.5MG/5ML
50 ELIXIR ORAL ONCE
Refills: 0 | Status: COMPLETED | OUTPATIENT
Start: 2025-07-19 | End: 2025-07-19

## 2025-07-19 RX ORDER — METOCLOPRAMIDE HCL 10 MG
10 TABLET ORAL ONCE
Refills: 0 | Status: COMPLETED | OUTPATIENT
Start: 2025-07-19 | End: 2025-07-19

## 2025-07-19 RX ORDER — ACETAMINOPHEN 500 MG/5ML
1000 LIQUID (ML) ORAL ONCE
Refills: 0 | Status: COMPLETED | OUTPATIENT
Start: 2025-07-19 | End: 2025-07-19

## 2025-07-19 RX ORDER — OXYCODONE HYDROCHLORIDE 30 MG/1
5 TABLET ORAL ONCE
Refills: 0 | Status: DISCONTINUED | OUTPATIENT
Start: 2025-07-19 | End: 2025-07-19

## 2025-07-19 RX ORDER — QUETIAPINE FUMARATE 25 MG/1
200 TABLET ORAL ONCE
Refills: 0 | Status: COMPLETED | OUTPATIENT
Start: 2025-07-19 | End: 2025-07-19

## 2025-07-19 RX ORDER — ACETAMINOPHEN 500 MG/5ML
975 LIQUID (ML) ORAL ONCE
Refills: 0 | Status: COMPLETED | OUTPATIENT
Start: 2025-07-19 | End: 2025-07-19

## 2025-07-19 RX ORDER — GABAPENTIN 400 MG/1
300 CAPSULE ORAL ONCE
Refills: 0 | Status: COMPLETED | OUTPATIENT
Start: 2025-07-19 | End: 2025-07-19

## 2025-07-19 RX ORDER — QUETIAPINE FUMARATE 25 MG/1
300 TABLET ORAL ONCE
Refills: 0 | Status: COMPLETED | OUTPATIENT
Start: 2025-07-19 | End: 2025-07-19

## 2025-07-19 RX ADMIN — Medication 1000 MILLILITER(S): at 17:48

## 2025-07-19 RX ADMIN — Medication 975 MILLIGRAM(S): at 18:15

## 2025-07-19 RX ADMIN — Medication 10 MILLIGRAM(S): at 22:58

## 2025-07-19 RX ADMIN — QUETIAPINE FUMARATE 300 MILLIGRAM(S): 25 TABLET ORAL at 01:06

## 2025-07-19 RX ADMIN — OXYCODONE HYDROCHLORIDE 5 MILLIGRAM(S): 30 TABLET ORAL at 22:58

## 2025-07-19 RX ADMIN — Medication 400 MILLIGRAM(S): at 17:48

## 2025-07-19 NOTE — ED ADULT TRIAGE NOTE - CHIEF COMPLAINT QUOTE
Pt arrives to ED c/o lower back pain related to diagnosed kidney stones yesterday at [facility]. Pt also reports pain during micturition, denies any blood in urine. Pt received 5mg Morphine IM to left deltoid by EMS.  Pt requests Doctor call Nurse Fabi [phone] from the intermediate with any updates.  hx: DM2, HTN, GERD, Seizure, Asthma, Favor V Leiden, Bipolar, DVT on blood thinner  fs =133 Pt arrives to ED c/o lower back pain related to diagnosed kidney stones yesterday at [facility]. Pt also reports pain during micturition, denies any blood in urine. Pt received 5mg Morphine IM to left deltoid by EMS.  Pt requests Doctor call Nurse Fabi [phone] from the [facility] with any updates.  hx: DM2, HTN, GERD, Seizure, Asthma, Favor V Leiden, Bipolar, DVT on blood thinner  fs =133   Pt currently resides in a rehab facility, not her former group home.

## 2025-07-19 NOTE — PROVIDER CONTACT NOTE (OTHER) - RECOMMENDATIONS
Trip scheduled with [facility] #[phone] with trip #[id number]. ETA is 60 to 90 minutes.

## 2025-07-19 NOTE — ED ADULT NURSE REASSESSMENT NOTE - NS ED NURSE REASSESS COMMENT FT1
IV removed, pt calm, resting comfortably in stretcher, drinking juice. Pending transport back to rehab center. SW contacted and aware
Report received from day RN: pt A&Ox3 resting comfortably in stretcher, breathing even and unlabored. Offers no complaints at this time. Instructed to call for assistance. Stretcher in lowest position, wheels locked, appropriate side rails in place, call bell in reach. Pending CT
pt remains A&Ox4 resting in stretcher. remains endorsing urinary symptoms and flank and abd pain. respirations even and unlabored on room air. medicated as per orders. plan of care continued.

## 2025-07-19 NOTE — PROVIDER CONTACT NOTE (OTHER) - ACTION/TREATMENT ORDERED:
with staff member, Cristiana, who is aware of pt returning. [facility] ([phone]) trip #83A arranged with ETA of 60 to 90 minutes. with staff member, Cristiana, who is aware of pt returning. [facility] ([phone]) trip #83A arranged with ETA of 60 to 90 minutes. Pt aware of d/c arrangements.

## 2025-07-19 NOTE — PROVIDER CONTACT NOTE (OTHER) - ASSESSMENT
Writer called residence at [phone] and spoke with staff member, Ivan, who is reporting she would need to speak with other staff as pt has been gone from facility for some time. In later conversation, Teresa reporting pt was at nursing facility gone from residence for a few weeks. Pt triage note then reviewed. Pt had arrived from [facility] #[phone]. Writer contacted number with multiple extension unable to speak with supervisor. Writer called residence at [phone] and spoke with staff member, Ivan, who is reporting she would need to speak with other staff as pt has been gone from facility for some time. In later conversation, Teresa reporting pt was at nursing facility gone from residence for a few weeks. Pt triage note then reviewed. Pt had arrived from [facility] #[phone]. Writer contacted number with multiple extensions unable to speak with supervisor.

## 2025-07-19 NOTE — ED PROVIDER NOTE - CLINICAL SUMMARY MEDICAL DECISION MAKING FREE TEXT BOX
Candelario Wakefield MD (Attending MD):Patient is a 40-year-old female  with history of factor V Leiden, DVT, PE, CAD, PID, endometriosis, asthma, presenting to emergency department for continued flank pain that is bilateral.  Patient recently diagnosed with kidney stones, seen  at this facility yesterday where CAT scan was done and seen in [place] today.  Patient states the reason she came is that her rehab center  requires  any prescribed medications to be listed in the paperwork where she was not.  Her hematuria has improved.  Pain is persistent but Percocet has worked well for her.  No fevers, chills, bowel changes, chest pain, shortness of breath.    review of  systems otherwise negative    General: Alert and Orientated x 3. No apparent distress.  Head: Normocephalic and atraumatic.  Eyes: PERRLA with EOMI.  Neck: Supple. Trachea midline.   Cardiac: Normal S1 and S2 w/ RRR. No murmurs appreciated. No JVD appreciated.  Pulmonary: CTA bilaterally. No increased WOB. No wheezes or crackles.  Abdominal: Soft, non-tender. (+) bowel sounds appreciated in all 4 quadrants. No hepatosplenomegaly.   Neurologic: No focal sensory or motor deficits.  Musculoskeletal: Strength appropriate in all 4 extremities for age with no limited ROM.  Skin: Color appropriate for race. Intact, warm, and well-perfused.  Psychiatric: Appropriate mood and affect. No apparent risk to self or others.     MDM: Patient hemodynamically stable.  Complex care note reviewed.  Given patient had extensive workup today and yesterday will treat pain with Percocet and have patient go back to facility.  Will have medications listed on paperwork.

## 2025-07-19 NOTE — ED PROVIDER NOTE - NSFOLLOWUPINSTRUCTIONS_ED_ALL_ED_FT
You were seen in the Emergency Department for flank pain.     Jt should take the following medication:    Oxycodone 5mg once every 6 hours as needed for pain for 3 days.     Reglan 10mg once every 8 hours as needed for nausea and vomiting for 3 days.     Tylenol 1000mg once every 6 hours as needed for pain       Return to ED for any new or worsening symptoms including but not limited to: development of chest pain, shortness of breath, fever, vomiting, focal numbness, weakness or tingling, any severe CP, headache, abdominal pain, back pain.      Rest and keep yourself hydrated with fluids.

## 2025-07-19 NOTE — ED PROVIDER NOTE - ATTENDING CONTRIBUTION TO CARE
Attending MD Wakefield: I have seen and examined this patient and fully participated in the care of this patient as the teaching attending. I personally made/approved the management plan and take responsibility for the patient management.         see mdm

## 2025-07-19 NOTE — ED ADULT NURSE NOTE - OBJECTIVE STATEMENT
Pt received to 3b  a&ox4, ambulatory, on room air coming to ED c/o lower back pain, L and R side pain . Pt history of DM2, HTN, GERD, seizure, asthma, favor V Leiden, bipolar and DVT. Patient endorses that she was recently diagnosed with kidney stones yesterday and that she was told to return if the pain came back. Patient received 5mg of morphine IM by EMS . Pt respirations even and unlabored, chest rise and fall equal b/l. Pt denies chest pain, HA, SOB, dizziness, N/V/D, fever/chills. Medicated as per MAR. Stretcher in lowest position, call bell within reach, pt safety maintained.

## 2025-07-19 NOTE — PROVIDER CONTACT NOTE (OTHER) - RECOMMENDATIONS
** Chart checked, confirms pt from [facility] at [address] [place] [address]. Pt room number is [id number]. Writer contacted back facility at [phone] and was transferred to 3rd floor. Writer spoke

## 2025-07-19 NOTE — PROVIDER CONTACT NOTE (OTHER) - ASSESSMENT
Pt arrives from [facility] at [address] RAVINDRA informed pt cleared for discharge at this time. Writer outreached [place] at [phone] multiple times with no answer. SW on staff with pt d/c last night and spoke with facility with no concerns. Writer will continued to outreach, however, non emergent ambulance will be arranged for pt  in the interim.

## 2025-07-19 NOTE — ED PROVIDER NOTE - PATIENT PORTAL LINK FT
You can access the FollowMyHealth Patient Portal offered by [facility] by registering at the following website: [email or web address]. By joining [employer]’s FollowMyHealth portal, you will also be able to view your health information using other applications (apps) compatible with our system.

## 2025-07-19 NOTE — ED ADULT NURSE NOTE - CHIEF COMPLAINT QUOTE
Pt arrives to ED c/o lower back pain related to diagnosed kidney stones yesterday at [facility]. Pt also reports pain during micturition, denies any blood in urine. Pt received 5mg Morphine IM to left deltoid by EMS.  Pt requests Doctor call Nurse Fabi [phone] from the [facility] with any updates.  hx: DM2, HTN, GERD, Seizure, Asthma, Favor V Leiden, Bipolar, DVT on blood thinner  fs =133   Pt currently resides in a rehab facility, not her former group home.

## 2025-07-20 VITALS
HEART RATE: 90 BPM | DIASTOLIC BLOOD PRESSURE: 95 MMHG | TEMPERATURE: 98 F | SYSTOLIC BLOOD PRESSURE: 134 MMHG | OXYGEN SATURATION: 99 % | RESPIRATION RATE: 17 BRPM

## 2025-07-20 LAB
CULTURE RESULTS: SIGNIFICANT CHANGE UP
SPECIMEN SOURCE: SIGNIFICANT CHANGE UP

## 2025-07-20 RX ADMIN — Medication 50 MILLIGRAM(S): at 00:03

## 2025-07-20 RX ADMIN — Medication 500 MILLIGRAM(S): at 00:03

## 2025-07-20 RX ADMIN — GABAPENTIN 300 MILLIGRAM(S): 400 CAPSULE ORAL at 00:03

## 2025-07-23 ENCOUNTER — EMERGENCY (EMERGENCY)
Facility: HOSPITAL | Age: 40
LOS: 1 days | End: 2025-07-23
Attending: STUDENT IN AN ORGANIZED HEALTH CARE EDUCATION/TRAINING PROGRAM | Admitting: EMERGENCY MEDICINE
Payer: MEDICARE

## 2025-07-23 VITALS
DIASTOLIC BLOOD PRESSURE: 69 MMHG | OXYGEN SATURATION: 100 % | RESPIRATION RATE: 18 BRPM | WEIGHT: 293 LBS | SYSTOLIC BLOOD PRESSURE: 103 MMHG | TEMPERATURE: 98 F | HEART RATE: 105 BPM | HEIGHT: 68 IN

## 2025-07-23 DIAGNOSIS — Z90.49 ACQUIRED ABSENCE OF OTHER SPECIFIED PARTS OF DIGESTIVE TRACT: Chronic | ICD-10-CM

## 2025-07-23 PROCEDURE — 36410 VNPNXR 3YR/> PHY/QHP DX/THER: CPT | Mod: GC

## 2025-07-23 PROCEDURE — 99285 EMERGENCY DEPT VISIT HI MDM: CPT | Mod: GC

## 2025-07-23 RX ORDER — ACETAMINOPHEN 500 MG/5ML
1000 LIQUID (ML) ORAL ONCE
Refills: 0 | Status: COMPLETED | OUTPATIENT
Start: 2025-07-23 | End: 2025-07-23

## 2025-07-23 RX ADMIN — Medication 400 MILLIGRAM(S): at 23:47

## 2025-07-23 RX ADMIN — Medication 1000 MILLILITER(S): at 23:47

## 2025-07-24 VITALS
RESPIRATION RATE: 17 BRPM | TEMPERATURE: 98 F | DIASTOLIC BLOOD PRESSURE: 89 MMHG | SYSTOLIC BLOOD PRESSURE: 131 MMHG | OXYGEN SATURATION: 100 % | HEART RATE: 92 BPM

## 2025-07-24 LAB
ALBUMIN SERPL ELPH-MCNC: 4 G/DL — SIGNIFICANT CHANGE UP (ref 3.3–5)
ALP SERPL-CCNC: 74 U/L — SIGNIFICANT CHANGE UP (ref 40–120)
ALT FLD-CCNC: 45 U/L — HIGH (ref 4–33)
ANION GAP SERPL CALC-SCNC: 15 MMOL/L — HIGH (ref 7–14)
APPEARANCE UR: CLEAR — SIGNIFICANT CHANGE UP
AST SERPL-CCNC: 29 U/L — SIGNIFICANT CHANGE UP (ref 4–32)
BACTERIA # UR AUTO: ABNORMAL /HPF
BASOPHILS # BLD AUTO: 0.02 K/UL — SIGNIFICANT CHANGE UP (ref 0–0.2)
BASOPHILS NFR BLD AUTO: 0.4 % — SIGNIFICANT CHANGE UP (ref 0–2)
BILIRUB SERPL-MCNC: <0.2 MG/DL — SIGNIFICANT CHANGE UP (ref 0.2–1.2)
BILIRUB UR-MCNC: NEGATIVE — SIGNIFICANT CHANGE UP
BUN SERPL-MCNC: 11 MG/DL — SIGNIFICANT CHANGE UP (ref 7–23)
CALCIUM SERPL-MCNC: 9.3 MG/DL — SIGNIFICANT CHANGE UP (ref 8.4–10.5)
CAST: 0 /LPF — SIGNIFICANT CHANGE UP (ref 0–4)
CHLORIDE SERPL-SCNC: 100 MMOL/L — SIGNIFICANT CHANGE UP (ref 98–107)
CO2 SERPL-SCNC: 23 MMOL/L — SIGNIFICANT CHANGE UP (ref 22–31)
COLOR SPEC: SIGNIFICANT CHANGE UP
CREAT SERPL-MCNC: 0.74 MG/DL — SIGNIFICANT CHANGE UP (ref 0.5–1.3)
DIFF PNL FLD: NEGATIVE — SIGNIFICANT CHANGE UP
EGFR: 106 ML/MIN/1.73M2 — SIGNIFICANT CHANGE UP
EGFR: 106 ML/MIN/1.73M2 — SIGNIFICANT CHANGE UP
EOSINOPHIL # BLD AUTO: 0.05 K/UL — SIGNIFICANT CHANGE UP (ref 0–0.5)
EOSINOPHIL NFR BLD AUTO: 1.1 % — SIGNIFICANT CHANGE UP (ref 0–6)
GLUCOSE SERPL-MCNC: 117 MG/DL — HIGH (ref 70–99)
GLUCOSE UR QL: NEGATIVE MG/DL — SIGNIFICANT CHANGE UP
HCG SERPL-ACNC: <1 MIU/ML — SIGNIFICANT CHANGE UP
HCT VFR BLD CALC: 33.2 % — LOW (ref 34.5–45)
HGB BLD-MCNC: 11 G/DL — LOW (ref 11.5–15.5)
IMM GRANULOCYTES # BLD AUTO: 0.01 K/UL — SIGNIFICANT CHANGE UP (ref 0–0.07)
IMM GRANULOCYTES NFR BLD AUTO: 0.2 % — SIGNIFICANT CHANGE UP (ref 0–0.9)
KETONES UR QL: ABNORMAL MG/DL
LEUKOCYTE ESTERASE UR-ACNC: NEGATIVE — SIGNIFICANT CHANGE UP
LYMPHOCYTES # BLD AUTO: 2.16 K/UL — SIGNIFICANT CHANGE UP (ref 1–3.3)
LYMPHOCYTES NFR BLD AUTO: 47.4 % — HIGH (ref 13–44)
MCHC RBC-ENTMCNC: 31.6 PG — SIGNIFICANT CHANGE UP (ref 27–34)
MCHC RBC-ENTMCNC: 33.1 G/DL — SIGNIFICANT CHANGE UP (ref 32–36)
MCV RBC AUTO: 95.4 FL — SIGNIFICANT CHANGE UP (ref 80–100)
MONOCYTES # BLD AUTO: 0.37 K/UL — SIGNIFICANT CHANGE UP (ref 0–0.9)
MONOCYTES NFR BLD AUTO: 8.1 % — SIGNIFICANT CHANGE UP (ref 2–14)
NEUTROPHILS # BLD AUTO: 1.95 K/UL — SIGNIFICANT CHANGE UP (ref 1.8–7.4)
NEUTROPHILS NFR BLD AUTO: 42.8 % — LOW (ref 43–77)
NITRITE UR-MCNC: NEGATIVE — SIGNIFICANT CHANGE UP
NRBC # BLD AUTO: 0 K/UL — SIGNIFICANT CHANGE UP (ref 0–0)
NRBC # FLD: 0 K/UL — SIGNIFICANT CHANGE UP (ref 0–0)
NRBC BLD AUTO-RTO: 0 /100 WBCS — SIGNIFICANT CHANGE UP (ref 0–0)
PH UR: 5 — SIGNIFICANT CHANGE UP (ref 5–8)
PLATELET # BLD AUTO: 130 K/UL — LOW (ref 150–400)
PMV BLD: 11.3 FL — SIGNIFICANT CHANGE UP (ref 7–13)
POTASSIUM SERPL-MCNC: 4.2 MMOL/L — SIGNIFICANT CHANGE UP (ref 3.5–5.3)
POTASSIUM SERPL-SCNC: 4.2 MMOL/L — SIGNIFICANT CHANGE UP (ref 3.5–5.3)
PROT SERPL-MCNC: 7.3 G/DL — SIGNIFICANT CHANGE UP (ref 6–8.3)
PROT UR-MCNC: SIGNIFICANT CHANGE UP MG/DL
RBC # BLD: 3.48 M/UL — LOW (ref 3.8–5.2)
RBC # FLD: 15.3 % — HIGH (ref 10.3–14.5)
RBC CASTS # UR COMP ASSIST: 4 /HPF — SIGNIFICANT CHANGE UP (ref 0–4)
REVIEW: SIGNIFICANT CHANGE UP
SODIUM SERPL-SCNC: 138 MMOL/L — SIGNIFICANT CHANGE UP (ref 135–145)
SP GR SPEC: 1.04 — HIGH (ref 1–1.03)
SQUAMOUS # UR AUTO: 8 /HPF — HIGH (ref 0–5)
UROBILINOGEN FLD QL: 0.2 MG/DL — SIGNIFICANT CHANGE UP (ref 0.2–1)
WBC # BLD: 4.56 K/UL — SIGNIFICANT CHANGE UP (ref 3.8–10.5)
WBC # FLD AUTO: 4.56 K/UL — SIGNIFICANT CHANGE UP (ref 3.8–10.5)
WBC UR QL: 1 /HPF — SIGNIFICANT CHANGE UP (ref 0–5)

## 2025-07-24 RX ORDER — OXYCODONE HYDROCHLORIDE AND ACETAMINOPHEN 10; 325 MG/1; MG/1
1 TABLET ORAL EVERY 4 HOURS
Refills: 0 | Status: DISCONTINUED | OUTPATIENT
Start: 2025-07-24 | End: 2025-07-24

## 2025-07-24 RX ORDER — ACETAMINOPHEN 500 MG/5ML
2 LIQUID (ML) ORAL
Qty: 112 | Refills: 0
Start: 2025-07-24 | End: 2025-08-06

## 2025-07-24 RX ORDER — QUETIAPINE FUMARATE 25 MG/1
300 TABLET ORAL ONCE
Refills: 0 | Status: COMPLETED | OUTPATIENT
Start: 2025-07-24 | End: 2025-07-24

## 2025-07-24 RX ORDER — MELATONIN 5 MG
1 TABLET ORAL
Qty: 14 | Refills: 0
Start: 2025-07-24 | End: 2025-08-06

## 2025-07-24 RX ORDER — DIPHENHYDRAMINE HCL 12.5MG/5ML
50 ELIXIR ORAL ONCE
Refills: 0 | Status: COMPLETED | OUTPATIENT
Start: 2025-07-24 | End: 2025-07-24

## 2025-07-24 RX ORDER — METOCLOPRAMIDE HCL 10 MG
1 TABLET ORAL
Qty: 42 | Refills: 0
Start: 2025-07-24 | End: 2025-08-06

## 2025-07-24 RX ORDER — DIPHENHYDRAMINE HCL 12.5MG/5ML
1 ELIXIR ORAL
Qty: 42 | Refills: 0
Start: 2025-07-24 | End: 2025-08-06

## 2025-07-24 RX ORDER — OXYCODONE HYDROCHLORIDE AND ACETAMINOPHEN 10; 325 MG/1; MG/1
1 TABLET ORAL ONCE
Refills: 0 | Status: DISCONTINUED | OUTPATIENT
Start: 2025-07-24 | End: 2025-07-24

## 2025-07-24 RX ORDER — QUETIAPINE FUMARATE 25 MG/1
1 TABLET ORAL
Qty: 14 | Refills: 0
Start: 2025-07-24 | End: 2025-08-06

## 2025-07-24 RX ORDER — OXYCODONE HYDROCHLORIDE 30 MG/1
5 TABLET ORAL ONCE
Refills: 0 | Status: DISCONTINUED | OUTPATIENT
Start: 2025-07-24 | End: 2025-07-24

## 2025-07-24 RX ORDER — SENNA 187 MG
1 TABLET ORAL
Qty: 28 | Refills: 0
Start: 2025-07-24 | End: 2025-08-06

## 2025-07-24 RX ORDER — DABIGATRAN ETEXILATE MESYLATE 30 MG/1
1 PELLET ORAL
Qty: 28 | Refills: 0
Start: 2025-07-24 | End: 2025-08-06

## 2025-07-24 RX ORDER — ATORVASTATIN CALCIUM 80 MG/1
1 TABLET, FILM COATED ORAL
Qty: 14 | Refills: 0
Start: 2025-07-24 | End: 2025-08-06

## 2025-07-24 RX ORDER — SENNA 187 MG
1 TABLET ORAL ONCE
Refills: 0 | Status: COMPLETED | OUTPATIENT
Start: 2025-07-24 | End: 2025-07-24

## 2025-07-24 RX ORDER — BUMETANIDE 1 MG/1
1 TABLET ORAL
Qty: 14 | Refills: 0
Start: 2025-07-24 | End: 2025-08-06

## 2025-07-24 RX ORDER — METHOCARBAMOL 500 MG/1
2 TABLET, FILM COATED ORAL
Qty: 56 | Refills: 0
Start: 2025-07-24 | End: 2025-08-06

## 2025-07-24 RX ORDER — ACETAMINOPHEN 500 MG/5ML
2 LIQUID (ML) ORAL
Qty: 84 | Refills: 0 | DISCHARGE
Start: 2025-07-24 | End: 2025-08-06

## 2025-07-24 RX ORDER — METHOCARBAMOL 500 MG/1
1000 TABLET, FILM COATED ORAL ONCE
Refills: 0 | Status: COMPLETED | OUTPATIENT
Start: 2025-07-24 | End: 2025-07-24

## 2025-07-24 RX ORDER — DIPHENHYDRAMINE HCL 12.5MG/5ML
25 ELIXIR ORAL ONCE
Refills: 0 | Status: COMPLETED | OUTPATIENT
Start: 2025-07-24 | End: 2025-07-24

## 2025-07-24 RX ORDER — LEVOTHYROXINE SODIUM 300 MCG
1 TABLET ORAL
Qty: 14 | Refills: 0
Start: 2025-07-24 | End: 2025-08-06

## 2025-07-24 RX ORDER — APIXABAN 2.5 MG/1
5 TABLET, FILM COATED ORAL ONCE
Refills: 0 | Status: COMPLETED | OUTPATIENT
Start: 2025-07-24 | End: 2025-07-24

## 2025-07-24 RX ORDER — BISACODYL 5 MG
5 TABLET, DELAYED RELEASE (ENTERIC COATED) ORAL ONCE
Refills: 0 | Status: DISCONTINUED | OUTPATIENT
Start: 2025-07-24 | End: 2025-07-27

## 2025-07-24 RX ORDER — DIPHENHYDRAMINE HCL 12.5MG/5ML
50 ELIXIR ORAL EVERY 4 HOURS
Refills: 0 | Status: DISCONTINUED | OUTPATIENT
Start: 2025-07-24 | End: 2025-07-27

## 2025-07-24 RX ORDER — GABAPENTIN 400 MG/1
1 CAPSULE ORAL
Qty: 42 | Refills: 0
Start: 2025-07-24 | End: 2025-08-06

## 2025-07-24 RX ORDER — DOCUSATE SODIUM 100 MG
1 CAPSULE ORAL
Qty: 28 | Refills: 0
Start: 2025-07-24 | End: 2025-08-06

## 2025-07-24 RX ORDER — OLANZAPINE 10 MG/1
1 TABLET ORAL
Qty: 14 | Refills: 0
Start: 2025-07-24 | End: 2025-08-06

## 2025-07-24 RX ORDER — OXYCODONE HYDROCHLORIDE 30 MG/1
1 TABLET ORAL
Qty: 12 | Refills: 0
Start: 2025-07-24 | End: 2025-07-26

## 2025-07-24 RX ORDER — GABAPENTIN 400 MG/1
300 CAPSULE ORAL ONCE
Refills: 0 | Status: COMPLETED | OUTPATIENT
Start: 2025-07-24 | End: 2025-07-24

## 2025-07-24 RX ORDER — OXYCODONE HYDROCHLORIDE 30 MG/1
2.5 TABLET ORAL ONCE
Refills: 0 | Status: DISCONTINUED | OUTPATIENT
Start: 2025-07-24 | End: 2025-07-24

## 2025-07-24 RX ORDER — CYCLOBENZAPRINE HYDROCHLORIDE 15 MG/1
10 CAPSULE, EXTENDED RELEASE ORAL ONCE
Refills: 0 | Status: COMPLETED | OUTPATIENT
Start: 2025-07-24 | End: 2025-07-24

## 2025-07-24 RX ADMIN — OXYCODONE HYDROCHLORIDE AND ACETAMINOPHEN 1 TABLET(S): 10; 325 TABLET ORAL at 11:16

## 2025-07-24 RX ADMIN — Medication 50 MILLIGRAM(S): at 15:43

## 2025-07-24 RX ADMIN — OXYCODONE HYDROCHLORIDE AND ACETAMINOPHEN 1 TABLET(S): 10; 325 TABLET ORAL at 15:17

## 2025-07-24 RX ADMIN — Medication 500 MILLIGRAM(S): at 10:02

## 2025-07-24 RX ADMIN — QUETIAPINE FUMARATE 300 MILLIGRAM(S): 25 TABLET ORAL at 10:03

## 2025-07-24 RX ADMIN — OXYCODONE HYDROCHLORIDE 5 MILLIGRAM(S): 30 TABLET ORAL at 03:52

## 2025-07-24 RX ADMIN — Medication 50 MILLIGRAM(S): at 11:43

## 2025-07-24 RX ADMIN — OXYCODONE HYDROCHLORIDE 5 MILLIGRAM(S): 30 TABLET ORAL at 07:20

## 2025-07-24 RX ADMIN — METHOCARBAMOL 1000 MILLIGRAM(S): 500 TABLET, FILM COATED ORAL at 10:11

## 2025-07-24 RX ADMIN — Medication 50 MILLIGRAM(S): at 09:08

## 2025-07-24 RX ADMIN — CYCLOBENZAPRINE HYDROCHLORIDE 10 MILLIGRAM(S): 15 CAPSULE, EXTENDED RELEASE ORAL at 16:09

## 2025-07-24 RX ADMIN — Medication 1 TABLET(S): at 10:03

## 2025-07-24 RX ADMIN — APIXABAN 5 MILLIGRAM(S): 2.5 TABLET, FILM COATED ORAL at 10:02

## 2025-07-24 RX ADMIN — GABAPENTIN 300 MILLIGRAM(S): 400 CAPSULE ORAL at 10:03

## 2025-07-24 RX ADMIN — Medication 25 MILLIGRAM(S): at 04:41

## 2025-07-27 LAB
-  AMPICILLIN/SULBACTAM: SIGNIFICANT CHANGE UP
-  AMPICILLIN: SIGNIFICANT CHANGE UP
-  AZTREONAM: SIGNIFICANT CHANGE UP
-  CEFAZOLIN: SIGNIFICANT CHANGE UP
-  CEFEPIME: SIGNIFICANT CHANGE UP
-  CEFTRIAXONE: SIGNIFICANT CHANGE UP
-  CEFUROXIME: SIGNIFICANT CHANGE UP
-  CIPROFLOXACIN: SIGNIFICANT CHANGE UP
-  ERTAPENEM: SIGNIFICANT CHANGE UP
-  GENTAMICIN: SIGNIFICANT CHANGE UP
-  IMIPENEM: SIGNIFICANT CHANGE UP
-  LEVOFLOXACIN: SIGNIFICANT CHANGE UP
-  MEROPENEM: SIGNIFICANT CHANGE UP
-  NITROFURANTOIN: SIGNIFICANT CHANGE UP
-  PIPERACILLIN/TAZOBACTAM: SIGNIFICANT CHANGE UP
-  RESISTANCE GENE OXA: SIGNIFICANT CHANGE UP
-  TIGECYCLINE: SIGNIFICANT CHANGE UP
-  TOBRAMYCIN: SIGNIFICANT CHANGE UP
-  TRIMETHOPRIM/SULFAMETHOXAZOLE: SIGNIFICANT CHANGE UP
BLANDM BLD POS QL PROBE: SIGNIFICANT CHANGE UP
CULTURE RESULTS: ABNORMAL
METHOD TYPE: SIGNIFICANT CHANGE UP
METHOD TYPE: SIGNIFICANT CHANGE UP
ORGANISM # SPEC MICROSCOPIC CNT: ABNORMAL
ORGANISM # SPEC MICROSCOPIC CNT: ABNORMAL
SPECIMEN SOURCE: SIGNIFICANT CHANGE UP

## 2025-07-28 ENCOUNTER — INPATIENT (INPATIENT)
Facility: HOSPITAL | Age: 40
LOS: 8 days | Discharge: HOME | End: 2025-08-06
Attending: INTERNAL MEDICINE | Admitting: INTERNAL MEDICINE
Payer: MEDICARE

## 2025-07-28 VITALS
HEIGHT: 69 IN | RESPIRATION RATE: 16 BRPM | TEMPERATURE: 98 F | DIASTOLIC BLOOD PRESSURE: 75 MMHG | HEART RATE: 99 BPM | WEIGHT: 293 LBS | OXYGEN SATURATION: 95 % | SYSTOLIC BLOOD PRESSURE: 105 MMHG

## 2025-07-28 DIAGNOSIS — N39.0 URINARY TRACT INFECTION, SITE NOT SPECIFIED: ICD-10-CM

## 2025-07-28 DIAGNOSIS — E03.9 HYPOTHYROIDISM, UNSPECIFIED: ICD-10-CM

## 2025-07-28 DIAGNOSIS — F31.9 BIPOLAR DISORDER, UNSPECIFIED: ICD-10-CM

## 2025-07-28 DIAGNOSIS — J45.909 UNSPECIFIED ASTHMA, UNCOMPLICATED: ICD-10-CM

## 2025-07-28 DIAGNOSIS — K21.9 GASTRO-ESOPHAGEAL REFLUX DISEASE WITHOUT ESOPHAGITIS: ICD-10-CM

## 2025-07-28 DIAGNOSIS — K58.9 IRRITABLE BOWEL SYNDROME, UNSPECIFIED: ICD-10-CM

## 2025-07-28 DIAGNOSIS — N80.9 ENDOMETRIOSIS, UNSPECIFIED: ICD-10-CM

## 2025-07-28 DIAGNOSIS — E11.9 TYPE 2 DIABETES MELLITUS WITHOUT COMPLICATIONS: ICD-10-CM

## 2025-07-28 DIAGNOSIS — Z90.49 ACQUIRED ABSENCE OF OTHER SPECIFIED PARTS OF DIGESTIVE TRACT: Chronic | ICD-10-CM

## 2025-07-28 DIAGNOSIS — Z29.9 ENCOUNTER FOR PROPHYLACTIC MEASURES, UNSPECIFIED: ICD-10-CM

## 2025-07-28 DIAGNOSIS — D68.51 ACTIVATED PROTEIN C RESISTANCE: ICD-10-CM

## 2025-07-28 DIAGNOSIS — G40.909 EPILEPSY, UNSPECIFIED, NOT INTRACTABLE, WITHOUT STATUS EPILEPTICUS: ICD-10-CM

## 2025-07-28 DIAGNOSIS — N12 TUBULO-INTERSTITIAL NEPHRITIS, NOT SPECIFIED AS ACUTE OR CHRONIC: ICD-10-CM

## 2025-07-28 LAB
ALBUMIN SERPL ELPH-MCNC: 4.4 G/DL — SIGNIFICANT CHANGE UP (ref 3.3–5)
ALP SERPL-CCNC: 66 U/L — SIGNIFICANT CHANGE UP (ref 40–120)
ALT FLD-CCNC: 59 U/L — HIGH (ref 4–33)
ANION GAP SERPL CALC-SCNC: 13 MMOL/L — SIGNIFICANT CHANGE UP (ref 7–14)
AST SERPL-CCNC: 56 U/L — HIGH (ref 4–32)
BASOPHILS # BLD AUTO: 0.01 K/UL — SIGNIFICANT CHANGE UP (ref 0–0.2)
BASOPHILS NFR BLD AUTO: 0.2 % — SIGNIFICANT CHANGE UP (ref 0–2)
BILIRUB SERPL-MCNC: 0.3 MG/DL — SIGNIFICANT CHANGE UP (ref 0.2–1.2)
BUN SERPL-MCNC: 8 MG/DL — SIGNIFICANT CHANGE UP (ref 7–23)
CALCIUM SERPL-MCNC: 9.3 MG/DL — SIGNIFICANT CHANGE UP (ref 8.4–10.5)
CHLORIDE SERPL-SCNC: 102 MMOL/L — SIGNIFICANT CHANGE UP (ref 98–107)
CO2 SERPL-SCNC: 24 MMOL/L — SIGNIFICANT CHANGE UP (ref 22–31)
CREAT SERPL-MCNC: 0.66 MG/DL — SIGNIFICANT CHANGE UP (ref 0.5–1.3)
EGFR: 115 ML/MIN/1.73M2 — SIGNIFICANT CHANGE UP
EGFR: 115 ML/MIN/1.73M2 — SIGNIFICANT CHANGE UP
EOSINOPHIL # BLD AUTO: 0.06 K/UL — SIGNIFICANT CHANGE UP (ref 0–0.5)
EOSINOPHIL NFR BLD AUTO: 1.3 % — SIGNIFICANT CHANGE UP (ref 0–6)
GAS PNL BLDV: SIGNIFICANT CHANGE UP
GLUCOSE SERPL-MCNC: 67 MG/DL — LOW (ref 70–99)
HCG SERPL-ACNC: <1 MIU/ML — SIGNIFICANT CHANGE UP
HCT VFR BLD CALC: 38.7 % — SIGNIFICANT CHANGE UP (ref 34.5–45)
HGB BLD-MCNC: 12.6 G/DL — SIGNIFICANT CHANGE UP (ref 11.5–15.5)
IMM GRANULOCYTES # BLD AUTO: 0.02 K/UL — SIGNIFICANT CHANGE UP (ref 0–0.07)
IMM GRANULOCYTES NFR BLD AUTO: 0.4 % — SIGNIFICANT CHANGE UP (ref 0–0.9)
LYMPHOCYTES # BLD AUTO: 1.84 K/UL — SIGNIFICANT CHANGE UP (ref 1–3.3)
LYMPHOCYTES NFR BLD AUTO: 39 % — SIGNIFICANT CHANGE UP (ref 13–44)
MAGNESIUM SERPL-MCNC: 2 MG/DL — SIGNIFICANT CHANGE UP (ref 1.6–2.6)
MCHC RBC-ENTMCNC: 30.7 PG — SIGNIFICANT CHANGE UP (ref 27–34)
MCHC RBC-ENTMCNC: 32.6 G/DL — SIGNIFICANT CHANGE UP (ref 32–36)
MCV RBC AUTO: 94.2 FL — SIGNIFICANT CHANGE UP (ref 80–100)
MONOCYTES # BLD AUTO: 0.44 K/UL — SIGNIFICANT CHANGE UP (ref 0–0.9)
MONOCYTES NFR BLD AUTO: 9.3 % — SIGNIFICANT CHANGE UP (ref 2–14)
NEUTROPHILS # BLD AUTO: 2.35 K/UL — SIGNIFICANT CHANGE UP (ref 1.8–7.4)
NEUTROPHILS NFR BLD AUTO: 49.8 % — SIGNIFICANT CHANGE UP (ref 43–77)
NRBC # BLD AUTO: 0 K/UL — SIGNIFICANT CHANGE UP (ref 0–0)
NRBC # FLD: 0 K/UL — SIGNIFICANT CHANGE UP (ref 0–0)
NRBC BLD AUTO-RTO: 0 /100 WBCS — SIGNIFICANT CHANGE UP (ref 0–0)
PLATELET # BLD AUTO: 151 K/UL — SIGNIFICANT CHANGE UP (ref 150–400)
PMV BLD: 10.3 FL — SIGNIFICANT CHANGE UP (ref 7–13)
POTASSIUM SERPL-MCNC: 5.6 MMOL/L — HIGH (ref 3.5–5.3)
POTASSIUM SERPL-SCNC: 5.6 MMOL/L — HIGH (ref 3.5–5.3)
PROT SERPL-MCNC: 8.2 G/DL — SIGNIFICANT CHANGE UP (ref 6–8.3)
RBC # BLD: 4.11 M/UL — SIGNIFICANT CHANGE UP (ref 3.8–5.2)
RBC # FLD: 15.2 % — HIGH (ref 10.3–14.5)
SODIUM SERPL-SCNC: 139 MMOL/L — SIGNIFICANT CHANGE UP (ref 135–145)
WBC # BLD: 4.72 K/UL — SIGNIFICANT CHANGE UP (ref 3.8–10.5)
WBC # FLD AUTO: 4.72 K/UL — SIGNIFICANT CHANGE UP (ref 3.8–10.5)

## 2025-07-28 PROCEDURE — 99285 EMERGENCY DEPT VISIT HI MDM: CPT

## 2025-07-28 PROCEDURE — 99223 1ST HOSP IP/OBS HIGH 75: CPT

## 2025-07-28 PROCEDURE — 76937 US GUIDE VASCULAR ACCESS: CPT | Mod: 26

## 2025-07-28 RX ORDER — DIPHENHYDRAMINE HCL 12.5MG/5ML
50 ELIXIR ORAL ONCE
Refills: 0 | Status: COMPLETED | OUTPATIENT
Start: 2025-07-28 | End: 2025-07-28

## 2025-07-28 RX ORDER — MAGNESIUM, ALUMINUM HYDROXIDE 200-200 MG
30 TABLET,CHEWABLE ORAL EVERY 4 HOURS
Refills: 0 | Status: DISCONTINUED | OUTPATIENT
Start: 2025-07-28 | End: 2025-08-06

## 2025-07-28 RX ORDER — METOCLOPRAMIDE HCL 10 MG
10 TABLET ORAL ONCE
Refills: 0 | Status: COMPLETED | OUTPATIENT
Start: 2025-07-28 | End: 2025-07-28

## 2025-07-28 RX ORDER — MELATONIN 5 MG
6 TABLET ORAL AT BEDTIME
Refills: 0 | Status: DISCONTINUED | OUTPATIENT
Start: 2025-07-28 | End: 2025-08-06

## 2025-07-28 RX ORDER — ESCITALOPRAM OXALATE 20 MG/1
10 TABLET ORAL DAILY
Refills: 0 | Status: DISCONTINUED | OUTPATIENT
Start: 2025-07-28 | End: 2025-08-06

## 2025-07-28 RX ORDER — SUCRALFATE 1 G
0 TABLET ORAL
Refills: 0 | DISCHARGE

## 2025-07-28 RX ORDER — SUCRALFATE 1 G
0.5 TABLET ORAL
Refills: 0 | Status: DISCONTINUED | OUTPATIENT
Start: 2025-07-28 | End: 2025-08-06

## 2025-07-28 RX ORDER — DABIGATRAN ETEXILATE MESYLATE 30 MG/1
150 PELLET ORAL
Refills: 0 | Status: DISCONTINUED | OUTPATIENT
Start: 2025-07-28 | End: 2025-08-06

## 2025-07-28 RX ORDER — DIPHENHYDRAMINE HCL 12.5MG/5ML
50 ELIXIR ORAL ONCE
Refills: 0 | Status: DISCONTINUED | OUTPATIENT
Start: 2025-07-28 | End: 2025-07-28

## 2025-07-28 RX ORDER — OLANZAPINE 10 MG/1
5 TABLET ORAL
Refills: 0 | Status: DISCONTINUED | OUTPATIENT
Start: 2025-07-28 | End: 2025-08-06

## 2025-07-28 RX ORDER — QUETIAPINE FUMARATE 25 MG/1
200 TABLET ORAL AT BEDTIME
Refills: 0 | Status: DISCONTINUED | OUTPATIENT
Start: 2025-07-28 | End: 2025-08-06

## 2025-07-28 RX ORDER — ATORVASTATIN CALCIUM 80 MG/1
20 TABLET, FILM COATED ORAL AT BEDTIME
Refills: 0 | Status: DISCONTINUED | OUTPATIENT
Start: 2025-07-28 | End: 2025-08-06

## 2025-07-28 RX ORDER — QUETIAPINE FUMARATE 25 MG/1
25 TABLET ORAL DAILY
Refills: 0 | Status: DISCONTINUED | OUTPATIENT
Start: 2025-07-28 | End: 2025-08-06

## 2025-07-28 RX ORDER — POLYETHYLENE GLYCOL 3350 17 G/17G
17 POWDER, FOR SOLUTION ORAL DAILY
Refills: 0 | Status: DISCONTINUED | OUTPATIENT
Start: 2025-07-28 | End: 2025-08-06

## 2025-07-28 RX ORDER — GABAPENTIN 400 MG/1
300 CAPSULE ORAL AT BEDTIME
Refills: 0 | Status: DISCONTINUED | OUTPATIENT
Start: 2025-07-28 | End: 2025-08-06

## 2025-07-28 RX ORDER — LEVOTHYROXINE SODIUM 300 MCG
100 TABLET ORAL DAILY
Refills: 0 | Status: DISCONTINUED | OUTPATIENT
Start: 2025-07-28 | End: 2025-08-06

## 2025-07-28 RX ORDER — METHOCARBAMOL 500 MG/1
500 TABLET, FILM COATED ORAL DAILY
Refills: 0 | Status: DISCONTINUED | OUTPATIENT
Start: 2025-07-28 | End: 2025-08-06

## 2025-07-28 RX ORDER — DIPHENHYDRAMINE HCL 12.5MG/5ML
25 ELIXIR ORAL EVERY 6 HOURS
Refills: 0 | Status: DISCONTINUED | OUTPATIENT
Start: 2025-07-28 | End: 2025-07-28

## 2025-07-28 RX ORDER — MEROPENEM 1 G/30ML
1000 INJECTION INTRAVENOUS EVERY 8 HOURS
Refills: 0 | Status: DISCONTINUED | OUTPATIENT
Start: 2025-07-28 | End: 2025-07-28

## 2025-07-28 RX ORDER — MEROPENEM 1 G/30ML
1000 INJECTION INTRAVENOUS EVERY 8 HOURS
Refills: 0 | Status: DISCONTINUED | OUTPATIENT
Start: 2025-07-28 | End: 2025-07-29

## 2025-07-28 RX ADMIN — Medication 4 MILLIGRAM(S): at 13:37

## 2025-07-28 RX ADMIN — Medication 500 MILLIGRAM(S): at 21:25

## 2025-07-28 RX ADMIN — Medication 2 MILLIGRAM(S): at 12:32

## 2025-07-28 RX ADMIN — MEROPENEM 100 MILLIGRAM(S): 1 INJECTION INTRAVENOUS at 13:37

## 2025-07-28 RX ADMIN — DABIGATRAN ETEXILATE MESYLATE 150 MILLIGRAM(S): 30 PELLET ORAL at 20:02

## 2025-07-28 RX ADMIN — Medication 50 MILLIGRAM(S): at 13:51

## 2025-07-28 RX ADMIN — Medication 10 MILLIGRAM(S): at 12:43

## 2025-07-28 RX ADMIN — Medication 50 MILLIGRAM(S): at 16:00

## 2025-07-28 RX ADMIN — Medication 0.5 GRAM(S): at 18:59

## 2025-07-28 RX ADMIN — ATORVASTATIN CALCIUM 20 MILLIGRAM(S): 80 TABLET, FILM COATED ORAL at 21:25

## 2025-07-28 RX ADMIN — Medication 50 MILLIGRAM(S): at 20:25

## 2025-07-28 RX ADMIN — METHOCARBAMOL 500 MILLIGRAM(S): 500 TABLET, FILM COATED ORAL at 21:25

## 2025-07-28 RX ADMIN — OLANZAPINE 5 MILLIGRAM(S): 10 TABLET ORAL at 18:59

## 2025-07-28 RX ADMIN — MEROPENEM 100 MILLIGRAM(S): 1 INJECTION INTRAVENOUS at 21:33

## 2025-07-28 RX ADMIN — GABAPENTIN 300 MILLIGRAM(S): 400 CAPSULE ORAL at 21:33

## 2025-07-28 RX ADMIN — Medication 6 MILLIGRAM(S): at 21:24

## 2025-07-28 RX ADMIN — Medication 500 MILLIGRAM(S): at 16:00

## 2025-07-28 RX ADMIN — QUETIAPINE FUMARATE 200 MILLIGRAM(S): 25 TABLET ORAL at 21:24

## 2025-07-29 LAB
ALBUMIN SERPL ELPH-MCNC: 3.8 G/DL — SIGNIFICANT CHANGE UP (ref 3.3–5)
ALP SERPL-CCNC: 70 U/L — SIGNIFICANT CHANGE UP (ref 40–120)
ALT FLD-CCNC: 46 U/L — HIGH (ref 4–33)
ANION GAP SERPL CALC-SCNC: 12 MMOL/L — SIGNIFICANT CHANGE UP (ref 7–14)
ANION GAP SERPL CALC-SCNC: 13 MMOL/L — SIGNIFICANT CHANGE UP (ref 7–14)
APPEARANCE UR: CLEAR — SIGNIFICANT CHANGE UP
AST SERPL-CCNC: 26 U/L — SIGNIFICANT CHANGE UP (ref 4–32)
BACTERIA # UR AUTO: ABNORMAL /HPF
BILIRUB SERPL-MCNC: <0.2 MG/DL — SIGNIFICANT CHANGE UP (ref 0.2–1.2)
BILIRUB UR-MCNC: NEGATIVE — SIGNIFICANT CHANGE UP
BUN SERPL-MCNC: 9 MG/DL — SIGNIFICANT CHANGE UP (ref 7–23)
BUN SERPL-MCNC: 9 MG/DL — SIGNIFICANT CHANGE UP (ref 7–23)
CALCIUM SERPL-MCNC: 8.6 MG/DL — SIGNIFICANT CHANGE UP (ref 8.4–10.5)
CALCIUM SERPL-MCNC: 8.8 MG/DL — SIGNIFICANT CHANGE UP (ref 8.4–10.5)
CHLORIDE SERPL-SCNC: 104 MMOL/L — SIGNIFICANT CHANGE UP (ref 98–107)
CHLORIDE SERPL-SCNC: 105 MMOL/L — SIGNIFICANT CHANGE UP (ref 98–107)
CHOLEST SERPL-MCNC: 149 MG/DL — SIGNIFICANT CHANGE UP
CO2 SERPL-SCNC: 20 MMOL/L — LOW (ref 22–31)
CO2 SERPL-SCNC: 22 MMOL/L — SIGNIFICANT CHANGE UP (ref 22–31)
COLOR SPEC: YELLOW — SIGNIFICANT CHANGE UP
COMMENT - URINE: SIGNIFICANT CHANGE UP
CREAT SERPL-MCNC: 0.64 MG/DL — SIGNIFICANT CHANGE UP (ref 0.5–1.3)
CREAT SERPL-MCNC: 0.66 MG/DL — SIGNIFICANT CHANGE UP (ref 0.5–1.3)
DIFF PNL FLD: NEGATIVE — SIGNIFICANT CHANGE UP
EGFR: 115 ML/MIN/1.73M2 — SIGNIFICANT CHANGE UP
EGFR: 115 ML/MIN/1.73M2 — SIGNIFICANT CHANGE UP
EGFR: 116 ML/MIN/1.73M2 — SIGNIFICANT CHANGE UP
EGFR: 116 ML/MIN/1.73M2 — SIGNIFICANT CHANGE UP
EPI CELLS # UR: PRESENT
GLUCOSE SERPL-MCNC: 101 MG/DL — HIGH (ref 70–99)
GLUCOSE SERPL-MCNC: 101 MG/DL — HIGH (ref 70–99)
GLUCOSE UR QL: NEGATIVE MG/DL — SIGNIFICANT CHANGE UP
HDLC SERPL-MCNC: 50 MG/DL — LOW
KETONES UR QL: NEGATIVE MG/DL — SIGNIFICANT CHANGE UP
LDLC SERPL-MCNC: 77 MG/DL — SIGNIFICANT CHANGE UP
LEUKOCYTE ESTERASE UR-ACNC: NEGATIVE — SIGNIFICANT CHANGE UP
LIPID PNL WITH DIRECT LDL SERPL: 77 MG/DL — SIGNIFICANT CHANGE UP
MAGNESIUM SERPL-MCNC: 1.9 MG/DL — SIGNIFICANT CHANGE UP (ref 1.6–2.6)
MRSA PCR RESULT.: SIGNIFICANT CHANGE UP
NITRITE UR-MCNC: NEGATIVE — SIGNIFICANT CHANGE UP
NONHDLC SERPL-MCNC: 99 MG/DL — SIGNIFICANT CHANGE UP
PH UR: 8 — SIGNIFICANT CHANGE UP (ref 5–8)
PHOSPHATE SERPL-MCNC: 3.8 MG/DL — SIGNIFICANT CHANGE UP (ref 2.5–4.5)
POTASSIUM SERPL-MCNC: 4.2 MMOL/L — SIGNIFICANT CHANGE UP (ref 3.5–5.3)
POTASSIUM SERPL-MCNC: 4.3 MMOL/L — SIGNIFICANT CHANGE UP (ref 3.5–5.3)
POTASSIUM SERPL-SCNC: 4.2 MMOL/L — SIGNIFICANT CHANGE UP (ref 3.5–5.3)
POTASSIUM SERPL-SCNC: 4.3 MMOL/L — SIGNIFICANT CHANGE UP (ref 3.5–5.3)
PROT SERPL-MCNC: 6.8 G/DL — SIGNIFICANT CHANGE UP (ref 6–8.3)
PROT UR-MCNC: NEGATIVE MG/DL — SIGNIFICANT CHANGE UP
RBC CASTS # UR COMP ASSIST: SIGNIFICANT CHANGE UP /HPF (ref 0–4)
S AUREUS DNA NOSE QL NAA+PROBE: SIGNIFICANT CHANGE UP
SODIUM SERPL-SCNC: 138 MMOL/L — SIGNIFICANT CHANGE UP (ref 135–145)
SODIUM SERPL-SCNC: 138 MMOL/L — SIGNIFICANT CHANGE UP (ref 135–145)
SP GR SPEC: 1.01 — SIGNIFICANT CHANGE UP (ref 1–1.03)
TRIGL SERPL-MCNC: 121 MG/DL — SIGNIFICANT CHANGE UP
TSH SERPL-MCNC: 1.93 UIU/ML — SIGNIFICANT CHANGE UP (ref 0.27–4.2)
UROBILINOGEN FLD QL: 0.2 MG/DL — SIGNIFICANT CHANGE UP (ref 0.2–1)
WBC UR QL: SIGNIFICANT CHANGE UP /HPF (ref 0–5)

## 2025-07-29 PROCEDURE — 99233 SBSQ HOSP IP/OBS HIGH 50: CPT

## 2025-07-29 PROCEDURE — G0545: CPT

## 2025-07-29 PROCEDURE — 99222 1ST HOSP IP/OBS MODERATE 55: CPT | Mod: GC

## 2025-07-29 RX ORDER — DIPHENHYDRAMINE HCL 12.5MG/5ML
25 ELIXIR ORAL EVERY 4 HOURS
Refills: 0 | Status: DISCONTINUED | OUTPATIENT
Start: 2025-07-29 | End: 2025-08-05

## 2025-07-29 RX ORDER — DIPHENHYDRAMINE HCL 12.5MG/5ML
50 ELIXIR ORAL ONCE
Refills: 0 | Status: COMPLETED | OUTPATIENT
Start: 2025-07-29 | End: 2025-07-29

## 2025-07-29 RX ORDER — CALAMINE 8% AND ZINC OXIDE 8% 160 MG/ML
1 LOTION TOPICAL
Refills: 0 | Status: DISCONTINUED | OUTPATIENT
Start: 2025-07-29 | End: 2025-08-01

## 2025-07-29 RX ORDER — DIPHENHYDRAMINE HCL 12.5MG/5ML
25 ELIXIR ORAL ONCE
Refills: 0 | Status: DISCONTINUED | OUTPATIENT
Start: 2025-07-29 | End: 2025-07-29

## 2025-07-29 RX ADMIN — GABAPENTIN 300 MILLIGRAM(S): 400 CAPSULE ORAL at 21:14

## 2025-07-29 RX ADMIN — Medication 500 MILLIGRAM(S): at 12:20

## 2025-07-29 RX ADMIN — Medication 50 MILLIGRAM(S): at 14:58

## 2025-07-29 RX ADMIN — Medication 50 MILLIGRAM(S): at 21:17

## 2025-07-29 RX ADMIN — Medication 500 MILLIGRAM(S): at 21:10

## 2025-07-29 RX ADMIN — Medication 100 MICROGRAM(S): at 04:23

## 2025-07-29 RX ADMIN — Medication 50 MILLIGRAM(S): at 01:46

## 2025-07-29 RX ADMIN — Medication 50 MILLIGRAM(S): at 08:16

## 2025-07-29 RX ADMIN — MEROPENEM 100 MILLIGRAM(S): 1 INJECTION INTRAVENOUS at 05:17

## 2025-07-29 RX ADMIN — MEROPENEM 100 MILLIGRAM(S): 1 INJECTION INTRAVENOUS at 13:46

## 2025-07-29 RX ADMIN — QUETIAPINE FUMARATE 25 MILLIGRAM(S): 25 TABLET ORAL at 12:23

## 2025-07-29 RX ADMIN — Medication 1 APPLICATION(S): at 12:20

## 2025-07-29 RX ADMIN — QUETIAPINE FUMARATE 200 MILLIGRAM(S): 25 TABLET ORAL at 21:10

## 2025-07-29 RX ADMIN — Medication 2 MILLIGRAM(S): at 23:52

## 2025-07-29 RX ADMIN — ATORVASTATIN CALCIUM 20 MILLIGRAM(S): 80 TABLET, FILM COATED ORAL at 21:10

## 2025-07-29 RX ADMIN — Medication 6 MILLIGRAM(S): at 21:09

## 2025-07-29 RX ADMIN — DABIGATRAN ETEXILATE MESYLATE 150 MILLIGRAM(S): 30 PELLET ORAL at 17:56

## 2025-07-29 RX ADMIN — METHOCARBAMOL 500 MILLIGRAM(S): 500 TABLET, FILM COATED ORAL at 12:19

## 2025-07-29 RX ADMIN — ESCITALOPRAM OXALATE 10 MILLIGRAM(S): 20 TABLET ORAL at 12:20

## 2025-07-29 RX ADMIN — Medication 500 MILLIGRAM(S): at 05:17

## 2025-07-29 RX ADMIN — Medication 0.5 GRAM(S): at 17:56

## 2025-07-29 RX ADMIN — Medication 0.5 GRAM(S): at 05:17

## 2025-07-29 RX ADMIN — DABIGATRAN ETEXILATE MESYLATE 150 MILLIGRAM(S): 30 PELLET ORAL at 05:20

## 2025-07-29 RX ADMIN — OLANZAPINE 5 MILLIGRAM(S): 10 TABLET ORAL at 05:17

## 2025-07-29 RX ADMIN — OLANZAPINE 5 MILLIGRAM(S): 10 TABLET ORAL at 17:56

## 2025-07-30 DIAGNOSIS — N89.8 OTHER SPECIFIED NONINFLAMMATORY DISORDERS OF VAGINA: ICD-10-CM

## 2025-07-30 LAB
ANION GAP SERPL CALC-SCNC: 10 MMOL/L — SIGNIFICANT CHANGE UP (ref 7–14)
BASOPHILS # BLD AUTO: 0.02 K/UL — SIGNIFICANT CHANGE UP (ref 0–0.2)
BASOPHILS NFR BLD AUTO: 0.4 % — SIGNIFICANT CHANGE UP (ref 0–2)
BUN SERPL-MCNC: 11 MG/DL — SIGNIFICANT CHANGE UP (ref 7–23)
C TRACH RRNA SPEC QL NAA+PROBE: SIGNIFICANT CHANGE UP
CALCIUM SERPL-MCNC: 8.8 MG/DL — SIGNIFICANT CHANGE UP (ref 8.4–10.5)
CHLORIDE SERPL-SCNC: 106 MMOL/L — SIGNIFICANT CHANGE UP (ref 98–107)
CO2 SERPL-SCNC: 22 MMOL/L — SIGNIFICANT CHANGE UP (ref 22–31)
CREAT SERPL-MCNC: 0.65 MG/DL — SIGNIFICANT CHANGE UP (ref 0.5–1.3)
CULTURE RESULTS: NO GROWTH — SIGNIFICANT CHANGE UP
EGFR: 116 ML/MIN/1.73M2 — SIGNIFICANT CHANGE UP
EGFR: 116 ML/MIN/1.73M2 — SIGNIFICANT CHANGE UP
EOSINOPHIL # BLD AUTO: 0.07 K/UL — SIGNIFICANT CHANGE UP (ref 0–0.5)
EOSINOPHIL NFR BLD AUTO: 1.5 % — SIGNIFICANT CHANGE UP (ref 0–6)
GLUCOSE SERPL-MCNC: 106 MG/DL — HIGH (ref 70–99)
HCT VFR BLD CALC: 31.5 % — LOW (ref 34.5–45)
HGB BLD-MCNC: 10.2 G/DL — LOW (ref 11.5–15.5)
IMM GRANULOCYTES # BLD AUTO: 0.02 K/UL — SIGNIFICANT CHANGE UP (ref 0–0.07)
IMM GRANULOCYTES NFR BLD AUTO: 0.4 % — SIGNIFICANT CHANGE UP (ref 0–0.9)
LYMPHOCYTES # BLD AUTO: 2.01 K/UL — SIGNIFICANT CHANGE UP (ref 1–3.3)
LYMPHOCYTES NFR BLD AUTO: 42.5 % — SIGNIFICANT CHANGE UP (ref 13–44)
MAGNESIUM SERPL-MCNC: 1.9 MG/DL — SIGNIFICANT CHANGE UP (ref 1.6–2.6)
MCHC RBC-ENTMCNC: 31.1 PG — SIGNIFICANT CHANGE UP (ref 27–34)
MCHC RBC-ENTMCNC: 32.4 G/DL — SIGNIFICANT CHANGE UP (ref 32–36)
MCV RBC AUTO: 96 FL — SIGNIFICANT CHANGE UP (ref 80–100)
MONOCYTES # BLD AUTO: 0.54 K/UL — SIGNIFICANT CHANGE UP (ref 0–0.9)
MONOCYTES NFR BLD AUTO: 11.4 % — SIGNIFICANT CHANGE UP (ref 2–14)
N GONORRHOEA RRNA SPEC QL NAA+PROBE: SIGNIFICANT CHANGE UP
NEUTROPHILS # BLD AUTO: 2.07 K/UL — SIGNIFICANT CHANGE UP (ref 1.8–7.4)
NEUTROPHILS NFR BLD AUTO: 43.8 % — SIGNIFICANT CHANGE UP (ref 43–77)
NRBC # BLD AUTO: 0 K/UL — SIGNIFICANT CHANGE UP (ref 0–0)
NRBC # FLD: 0 K/UL — SIGNIFICANT CHANGE UP (ref 0–0)
NRBC BLD AUTO-RTO: 0 /100 WBCS — SIGNIFICANT CHANGE UP (ref 0–0)
PHOSPHATE SERPL-MCNC: 3.7 MG/DL — SIGNIFICANT CHANGE UP (ref 2.5–4.5)
PLATELET # BLD AUTO: 147 K/UL — LOW (ref 150–400)
PMV BLD: 10.5 FL — SIGNIFICANT CHANGE UP (ref 7–13)
POTASSIUM SERPL-MCNC: 4.4 MMOL/L — SIGNIFICANT CHANGE UP (ref 3.5–5.3)
POTASSIUM SERPL-SCNC: 4.4 MMOL/L — SIGNIFICANT CHANGE UP (ref 3.5–5.3)
RBC # BLD: 3.28 M/UL — LOW (ref 3.8–5.2)
RBC # FLD: 15.3 % — HIGH (ref 10.3–14.5)
SODIUM SERPL-SCNC: 138 MMOL/L — SIGNIFICANT CHANGE UP (ref 135–145)
SPECIMEN SOURCE: SIGNIFICANT CHANGE UP
VALPROATE SERPL-MCNC: 30.3 UG/ML — LOW (ref 50–100)
WBC # BLD: 4.73 K/UL — SIGNIFICANT CHANGE UP (ref 3.8–10.5)
WBC # FLD AUTO: 4.73 K/UL — SIGNIFICANT CHANGE UP (ref 3.8–10.5)

## 2025-07-30 PROCEDURE — 99221 1ST HOSP IP/OBS SF/LOW 40: CPT | Mod: GC

## 2025-07-30 PROCEDURE — 99222 1ST HOSP IP/OBS MODERATE 55: CPT | Mod: FS,GC

## 2025-07-30 PROCEDURE — 99233 SBSQ HOSP IP/OBS HIGH 50: CPT

## 2025-07-30 RX ORDER — DIPHENHYDRAMINE HCL 12.5MG/5ML
50 ELIXIR ORAL ONCE
Refills: 0 | Status: COMPLETED | OUTPATIENT
Start: 2025-07-30 | End: 2025-07-30

## 2025-07-30 RX ORDER — DIPHENHYDRAMINE HCL 12.5MG/5ML
25 ELIXIR ORAL ONCE
Refills: 0 | Status: COMPLETED | OUTPATIENT
Start: 2025-07-30 | End: 2025-07-30

## 2025-07-30 RX ADMIN — Medication 6 MILLIGRAM(S): at 21:54

## 2025-07-30 RX ADMIN — Medication 1 APPLICATION(S): at 11:45

## 2025-07-30 RX ADMIN — Medication 2 MILLIGRAM(S): at 18:45

## 2025-07-30 RX ADMIN — Medication 50 MILLIGRAM(S): at 13:46

## 2025-07-30 RX ADMIN — QUETIAPINE FUMARATE 200 MILLIGRAM(S): 25 TABLET ORAL at 21:54

## 2025-07-30 RX ADMIN — Medication 2 MILLIGRAM(S): at 12:15

## 2025-07-30 RX ADMIN — Medication 2 MILLIGRAM(S): at 18:12

## 2025-07-30 RX ADMIN — DABIGATRAN ETEXILATE MESYLATE 150 MILLIGRAM(S): 30 PELLET ORAL at 05:02

## 2025-07-30 RX ADMIN — Medication 50 MILLIGRAM(S): at 05:03

## 2025-07-30 RX ADMIN — OLANZAPINE 5 MILLIGRAM(S): 10 TABLET ORAL at 18:14

## 2025-07-30 RX ADMIN — Medication 25 MILLIGRAM(S): at 23:00

## 2025-07-30 RX ADMIN — Medication 500 MILLIGRAM(S): at 13:47

## 2025-07-30 RX ADMIN — POLYETHYLENE GLYCOL 3350 17 GRAM(S): 17 POWDER, FOR SOLUTION ORAL at 11:46

## 2025-07-30 RX ADMIN — ATORVASTATIN CALCIUM 20 MILLIGRAM(S): 80 TABLET, FILM COATED ORAL at 21:55

## 2025-07-30 RX ADMIN — Medication 500 MILLIGRAM(S): at 21:54

## 2025-07-30 RX ADMIN — Medication 500 MILLIGRAM(S): at 06:13

## 2025-07-30 RX ADMIN — QUETIAPINE FUMARATE 25 MILLIGRAM(S): 25 TABLET ORAL at 11:46

## 2025-07-30 RX ADMIN — Medication 0.5 GRAM(S): at 05:03

## 2025-07-30 RX ADMIN — Medication 0.5 GRAM(S): at 18:15

## 2025-07-30 RX ADMIN — Medication 100 MICROGRAM(S): at 04:03

## 2025-07-30 RX ADMIN — GABAPENTIN 300 MILLIGRAM(S): 400 CAPSULE ORAL at 21:54

## 2025-07-30 RX ADMIN — DABIGATRAN ETEXILATE MESYLATE 150 MILLIGRAM(S): 30 PELLET ORAL at 18:15

## 2025-07-30 RX ADMIN — OLANZAPINE 5 MILLIGRAM(S): 10 TABLET ORAL at 05:03

## 2025-07-30 RX ADMIN — METHOCARBAMOL 500 MILLIGRAM(S): 500 TABLET, FILM COATED ORAL at 11:45

## 2025-07-30 RX ADMIN — Medication 2 MILLIGRAM(S): at 11:47

## 2025-07-30 RX ADMIN — ESCITALOPRAM OXALATE 10 MILLIGRAM(S): 20 TABLET ORAL at 11:45

## 2025-07-31 LAB
ANION GAP SERPL CALC-SCNC: 11 MMOL/L — SIGNIFICANT CHANGE UP (ref 7–14)
BUN SERPL-MCNC: 11 MG/DL — SIGNIFICANT CHANGE UP (ref 7–23)
CALCIUM SERPL-MCNC: 9 MG/DL — SIGNIFICANT CHANGE UP (ref 8.4–10.5)
CHLORIDE SERPL-SCNC: 105 MMOL/L — SIGNIFICANT CHANGE UP (ref 98–107)
CO2 SERPL-SCNC: 23 MMOL/L — SIGNIFICANT CHANGE UP (ref 22–31)
CREAT SERPL-MCNC: 0.65 MG/DL — SIGNIFICANT CHANGE UP (ref 0.5–1.3)
EGFR: 116 ML/MIN/1.73M2 — SIGNIFICANT CHANGE UP
EGFR: 116 ML/MIN/1.73M2 — SIGNIFICANT CHANGE UP
GLUCOSE SERPL-MCNC: 111 MG/DL — HIGH (ref 70–99)
HCT VFR BLD CALC: 31.1 % — LOW (ref 34.5–45)
HGB BLD-MCNC: 10.1 G/DL — LOW (ref 11.5–15.5)
MAGNESIUM SERPL-MCNC: 1.8 MG/DL — SIGNIFICANT CHANGE UP (ref 1.6–2.6)
MCHC RBC-ENTMCNC: 31.1 PG — SIGNIFICANT CHANGE UP (ref 27–34)
MCHC RBC-ENTMCNC: 32.5 G/DL — SIGNIFICANT CHANGE UP (ref 32–36)
MCV RBC AUTO: 95.7 FL — SIGNIFICANT CHANGE UP (ref 80–100)
NRBC # BLD AUTO: 0 K/UL — SIGNIFICANT CHANGE UP (ref 0–0)
NRBC # FLD: 0 K/UL — SIGNIFICANT CHANGE UP (ref 0–0)
NRBC BLD AUTO-RTO: 0 /100 WBCS — SIGNIFICANT CHANGE UP (ref 0–0)
PHOSPHATE SERPL-MCNC: 3.8 MG/DL — SIGNIFICANT CHANGE UP (ref 2.5–4.5)
PLATELET # BLD AUTO: 156 K/UL — SIGNIFICANT CHANGE UP (ref 150–400)
PMV BLD: 10.4 FL — SIGNIFICANT CHANGE UP (ref 7–13)
POTASSIUM SERPL-MCNC: 4.2 MMOL/L — SIGNIFICANT CHANGE UP (ref 3.5–5.3)
POTASSIUM SERPL-SCNC: 4.2 MMOL/L — SIGNIFICANT CHANGE UP (ref 3.5–5.3)
RBC # BLD: 3.25 M/UL — LOW (ref 3.8–5.2)
RBC # FLD: 15.2 % — HIGH (ref 10.3–14.5)
SODIUM SERPL-SCNC: 139 MMOL/L — SIGNIFICANT CHANGE UP (ref 135–145)
VALPROATE SERPL-MCNC: 26.2 UG/ML — LOW (ref 50–100)
WBC # BLD: 4.84 K/UL — SIGNIFICANT CHANGE UP (ref 3.8–10.5)
WBC # FLD AUTO: 4.84 K/UL — SIGNIFICANT CHANGE UP (ref 3.8–10.5)

## 2025-07-31 PROCEDURE — 99233 SBSQ HOSP IP/OBS HIGH 50: CPT

## 2025-07-31 RX ORDER — HYDROXYZINE HYDROCHLORIDE 25 MG/1
25 TABLET, FILM COATED ORAL ONCE
Refills: 0 | Status: COMPLETED | OUTPATIENT
Start: 2025-07-31 | End: 2025-07-31

## 2025-07-31 RX ORDER — HYDROXYZINE HYDROCHLORIDE 25 MG/1
25 TABLET, FILM COATED ORAL ONCE
Refills: 0 | Status: DISCONTINUED | OUTPATIENT
Start: 2025-07-31 | End: 2025-07-31

## 2025-07-31 RX ADMIN — Medication 2 MILLIGRAM(S): at 07:11

## 2025-07-31 RX ADMIN — Medication 500 MILLIGRAM(S): at 05:36

## 2025-07-31 RX ADMIN — DABIGATRAN ETEXILATE MESYLATE 150 MILLIGRAM(S): 30 PELLET ORAL at 17:10

## 2025-07-31 RX ADMIN — Medication 500 MILLIGRAM(S): at 14:15

## 2025-07-31 RX ADMIN — ESCITALOPRAM OXALATE 10 MILLIGRAM(S): 20 TABLET ORAL at 12:01

## 2025-07-31 RX ADMIN — Medication 2 MILLIGRAM(S): at 06:41

## 2025-07-31 RX ADMIN — Medication 1 APPLICATION(S): at 12:02

## 2025-07-31 RX ADMIN — DABIGATRAN ETEXILATE MESYLATE 150 MILLIGRAM(S): 30 PELLET ORAL at 05:35

## 2025-07-31 RX ADMIN — Medication 0.5 GRAM(S): at 17:10

## 2025-07-31 RX ADMIN — GABAPENTIN 300 MILLIGRAM(S): 400 CAPSULE ORAL at 22:01

## 2025-07-31 RX ADMIN — ATORVASTATIN CALCIUM 20 MILLIGRAM(S): 80 TABLET, FILM COATED ORAL at 22:00

## 2025-07-31 RX ADMIN — OLANZAPINE 5 MILLIGRAM(S): 10 TABLET ORAL at 17:11

## 2025-07-31 RX ADMIN — HYDROXYZINE HYDROCHLORIDE 25 MILLIGRAM(S): 25 TABLET, FILM COATED ORAL at 18:28

## 2025-07-31 RX ADMIN — METHOCARBAMOL 500 MILLIGRAM(S): 500 TABLET, FILM COATED ORAL at 12:01

## 2025-07-31 RX ADMIN — Medication 100 MICROGRAM(S): at 04:51

## 2025-07-31 RX ADMIN — OLANZAPINE 5 MILLIGRAM(S): 10 TABLET ORAL at 05:37

## 2025-07-31 RX ADMIN — Medication 500 MILLIGRAM(S): at 22:00

## 2025-07-31 RX ADMIN — QUETIAPINE FUMARATE 200 MILLIGRAM(S): 25 TABLET ORAL at 22:00

## 2025-07-31 RX ADMIN — Medication 25 MILLIGRAM(S): at 14:15

## 2025-07-31 RX ADMIN — Medication 0.5 GRAM(S): at 05:36

## 2025-07-31 RX ADMIN — Medication 6 MILLIGRAM(S): at 22:01

## 2025-07-31 RX ADMIN — QUETIAPINE FUMARATE 25 MILLIGRAM(S): 25 TABLET ORAL at 12:01

## 2025-08-01 LAB
ANION GAP SERPL CALC-SCNC: 13 MMOL/L — SIGNIFICANT CHANGE UP (ref 7–14)
BUN SERPL-MCNC: 9 MG/DL — SIGNIFICANT CHANGE UP (ref 7–23)
CALCIUM SERPL-MCNC: 8.9 MG/DL — SIGNIFICANT CHANGE UP (ref 8.4–10.5)
CHLORIDE SERPL-SCNC: 104 MMOL/L — SIGNIFICANT CHANGE UP (ref 98–107)
CO2 SERPL-SCNC: 22 MMOL/L — SIGNIFICANT CHANGE UP (ref 22–31)
CREAT SERPL-MCNC: 0.59 MG/DL — SIGNIFICANT CHANGE UP (ref 0.5–1.3)
EGFR: 118 ML/MIN/1.73M2 — SIGNIFICANT CHANGE UP
EGFR: 118 ML/MIN/1.73M2 — SIGNIFICANT CHANGE UP
GLUCOSE SERPL-MCNC: 94 MG/DL — SIGNIFICANT CHANGE UP (ref 70–99)
HCT VFR BLD CALC: 31.4 % — LOW (ref 34.5–45)
HGB BLD-MCNC: 10.3 G/DL — LOW (ref 11.5–15.5)
MAGNESIUM SERPL-MCNC: 1.8 MG/DL — SIGNIFICANT CHANGE UP (ref 1.6–2.6)
MCHC RBC-ENTMCNC: 31.1 PG — SIGNIFICANT CHANGE UP (ref 27–34)
MCHC RBC-ENTMCNC: 32.8 G/DL — SIGNIFICANT CHANGE UP (ref 32–36)
MCV RBC AUTO: 94.9 FL — SIGNIFICANT CHANGE UP (ref 80–100)
NRBC # BLD AUTO: 0 K/UL — SIGNIFICANT CHANGE UP (ref 0–0)
NRBC # FLD: 0 K/UL — SIGNIFICANT CHANGE UP (ref 0–0)
NRBC BLD AUTO-RTO: 0 /100 WBCS — SIGNIFICANT CHANGE UP (ref 0–0)
PHOSPHATE SERPL-MCNC: 4.3 MG/DL — SIGNIFICANT CHANGE UP (ref 2.5–4.5)
PLATELET # BLD AUTO: 164 K/UL — SIGNIFICANT CHANGE UP (ref 150–400)
PMV BLD: 10.1 FL — SIGNIFICANT CHANGE UP (ref 7–13)
POTASSIUM SERPL-MCNC: 4.3 MMOL/L — SIGNIFICANT CHANGE UP (ref 3.5–5.3)
POTASSIUM SERPL-SCNC: 4.3 MMOL/L — SIGNIFICANT CHANGE UP (ref 3.5–5.3)
RBC # BLD: 3.31 M/UL — LOW (ref 3.8–5.2)
RBC # FLD: 15.2 % — HIGH (ref 10.3–14.5)
SODIUM SERPL-SCNC: 139 MMOL/L — SIGNIFICANT CHANGE UP (ref 135–145)
WBC # BLD: 5.92 K/UL — SIGNIFICANT CHANGE UP (ref 3.8–10.5)
WBC # FLD AUTO: 5.92 K/UL — SIGNIFICANT CHANGE UP (ref 3.8–10.5)

## 2025-08-01 PROCEDURE — 99233 SBSQ HOSP IP/OBS HIGH 50: CPT

## 2025-08-01 PROCEDURE — 74176 CT ABD & PELVIS W/O CONTRAST: CPT | Mod: 26

## 2025-08-01 RX ORDER — CLONAZEPAM 0.5 MG/1
0.5 TABLET ORAL ONCE
Refills: 0 | Status: DISCONTINUED | OUTPATIENT
Start: 2025-08-01 | End: 2025-08-01

## 2025-08-01 RX ORDER — HYDROXYZINE HYDROCHLORIDE 25 MG/1
25 TABLET, FILM COATED ORAL ONCE
Refills: 0 | Status: COMPLETED | OUTPATIENT
Start: 2025-08-01 | End: 2025-08-01

## 2025-08-01 RX ADMIN — Medication 500 MILLIGRAM(S): at 05:24

## 2025-08-01 RX ADMIN — Medication 1 APPLICATION(S): at 12:20

## 2025-08-01 RX ADMIN — ESCITALOPRAM OXALATE 10 MILLIGRAM(S): 20 TABLET ORAL at 12:18

## 2025-08-01 RX ADMIN — Medication 6 MILLIGRAM(S): at 21:42

## 2025-08-01 RX ADMIN — Medication 500 MILLIGRAM(S): at 14:20

## 2025-08-01 RX ADMIN — Medication 0.5 GRAM(S): at 05:24

## 2025-08-01 RX ADMIN — Medication 500 MILLIGRAM(S): at 21:43

## 2025-08-01 RX ADMIN — DABIGATRAN ETEXILATE MESYLATE 150 MILLIGRAM(S): 30 PELLET ORAL at 05:23

## 2025-08-01 RX ADMIN — OLANZAPINE 5 MILLIGRAM(S): 10 TABLET ORAL at 05:23

## 2025-08-01 RX ADMIN — HYDROXYZINE HYDROCHLORIDE 25 MILLIGRAM(S): 25 TABLET, FILM COATED ORAL at 17:26

## 2025-08-01 RX ADMIN — GABAPENTIN 300 MILLIGRAM(S): 400 CAPSULE ORAL at 21:43

## 2025-08-01 RX ADMIN — DABIGATRAN ETEXILATE MESYLATE 150 MILLIGRAM(S): 30 PELLET ORAL at 17:27

## 2025-08-01 RX ADMIN — METHOCARBAMOL 500 MILLIGRAM(S): 500 TABLET, FILM COATED ORAL at 12:18

## 2025-08-01 RX ADMIN — QUETIAPINE FUMARATE 200 MILLIGRAM(S): 25 TABLET ORAL at 21:43

## 2025-08-01 RX ADMIN — QUETIAPINE FUMARATE 25 MILLIGRAM(S): 25 TABLET ORAL at 12:17

## 2025-08-01 RX ADMIN — Medication 0.5 GRAM(S): at 17:28

## 2025-08-01 RX ADMIN — ATORVASTATIN CALCIUM 20 MILLIGRAM(S): 80 TABLET, FILM COATED ORAL at 21:43

## 2025-08-01 RX ADMIN — Medication 100 MICROGRAM(S): at 04:46

## 2025-08-01 RX ADMIN — OLANZAPINE 5 MILLIGRAM(S): 10 TABLET ORAL at 17:29

## 2025-08-01 NOTE — ED ADULT NURSE NOTE - LAST KNOWN WELL DATE/TIME
Seen 4/22/25. Next appt 8/14/25, urine screen 7/5/23, she will need one done when she comes in in August, last agreement 4/2/24  
09-Mar-2025 04:05

## 2025-08-02 LAB
ANION GAP SERPL CALC-SCNC: 13 MMOL/L — SIGNIFICANT CHANGE UP (ref 7–14)
BUN SERPL-MCNC: 10 MG/DL — SIGNIFICANT CHANGE UP (ref 7–23)
CALCIUM SERPL-MCNC: 9.3 MG/DL — SIGNIFICANT CHANGE UP (ref 8.4–10.5)
CHLORIDE SERPL-SCNC: 103 MMOL/L — SIGNIFICANT CHANGE UP (ref 98–107)
CO2 SERPL-SCNC: 22 MMOL/L — SIGNIFICANT CHANGE UP (ref 22–31)
CREAT SERPL-MCNC: 0.58 MG/DL — SIGNIFICANT CHANGE UP (ref 0.5–1.3)
CULTURE RESULTS: SIGNIFICANT CHANGE UP
CULTURE RESULTS: SIGNIFICANT CHANGE UP
EGFR: 119 ML/MIN/1.73M2 — SIGNIFICANT CHANGE UP
EGFR: 119 ML/MIN/1.73M2 — SIGNIFICANT CHANGE UP
GLUCOSE SERPL-MCNC: 121 MG/DL — HIGH (ref 70–99)
HCT VFR BLD CALC: 33.6 % — LOW (ref 34.5–45)
HGB BLD-MCNC: 11 G/DL — LOW (ref 11.5–15.5)
MAGNESIUM SERPL-MCNC: 1.7 MG/DL — SIGNIFICANT CHANGE UP (ref 1.6–2.6)
MCHC RBC-ENTMCNC: 30.7 PG — SIGNIFICANT CHANGE UP (ref 27–34)
MCHC RBC-ENTMCNC: 32.7 G/DL — SIGNIFICANT CHANGE UP (ref 32–36)
MCV RBC AUTO: 93.9 FL — SIGNIFICANT CHANGE UP (ref 80–100)
NRBC # BLD AUTO: 0 K/UL — SIGNIFICANT CHANGE UP (ref 0–0)
NRBC # FLD: 0 K/UL — SIGNIFICANT CHANGE UP (ref 0–0)
NRBC BLD AUTO-RTO: 0 /100 WBCS — SIGNIFICANT CHANGE UP (ref 0–0)
PHOSPHATE SERPL-MCNC: 3.5 MG/DL — SIGNIFICANT CHANGE UP (ref 2.5–4.5)
PLATELET # BLD AUTO: 140 K/UL — LOW (ref 150–400)
PMV BLD: 11.7 FL — SIGNIFICANT CHANGE UP (ref 7–13)
POTASSIUM SERPL-MCNC: 4.2 MMOL/L — SIGNIFICANT CHANGE UP (ref 3.5–5.3)
POTASSIUM SERPL-SCNC: 4.2 MMOL/L — SIGNIFICANT CHANGE UP (ref 3.5–5.3)
RBC # BLD: 3.58 M/UL — LOW (ref 3.8–5.2)
RBC # FLD: 15.1 % — HIGH (ref 10.3–14.5)
SODIUM SERPL-SCNC: 138 MMOL/L — SIGNIFICANT CHANGE UP (ref 135–145)
SPECIMEN SOURCE: SIGNIFICANT CHANGE UP
SPECIMEN SOURCE: SIGNIFICANT CHANGE UP
WBC # BLD: 5.25 K/UL — SIGNIFICANT CHANGE UP (ref 3.8–10.5)
WBC # FLD AUTO: 5.25 K/UL — SIGNIFICANT CHANGE UP (ref 3.8–10.5)

## 2025-08-02 PROCEDURE — 99233 SBSQ HOSP IP/OBS HIGH 50: CPT

## 2025-08-02 PROCEDURE — 76830 TRANSVAGINAL US NON-OB: CPT | Mod: 26

## 2025-08-02 RX ORDER — CLONAZEPAM 0.5 MG/1
0.5 TABLET ORAL ONCE
Refills: 0 | Status: DISCONTINUED | OUTPATIENT
Start: 2025-08-02 | End: 2025-08-02

## 2025-08-02 RX ADMIN — DABIGATRAN ETEXILATE MESYLATE 150 MILLIGRAM(S): 30 PELLET ORAL at 05:54

## 2025-08-02 RX ADMIN — ESCITALOPRAM OXALATE 10 MILLIGRAM(S): 20 TABLET ORAL at 12:21

## 2025-08-02 RX ADMIN — Medication 6 MILLIGRAM(S): at 21:28

## 2025-08-02 RX ADMIN — Medication 100 MICROGRAM(S): at 04:50

## 2025-08-02 RX ADMIN — Medication 1 APPLICATION(S): at 12:22

## 2025-08-02 RX ADMIN — QUETIAPINE FUMARATE 200 MILLIGRAM(S): 25 TABLET ORAL at 21:30

## 2025-08-02 RX ADMIN — Medication 500 MILLIGRAM(S): at 05:54

## 2025-08-02 RX ADMIN — OLANZAPINE 5 MILLIGRAM(S): 10 TABLET ORAL at 17:20

## 2025-08-02 RX ADMIN — ATORVASTATIN CALCIUM 20 MILLIGRAM(S): 80 TABLET, FILM COATED ORAL at 21:30

## 2025-08-02 RX ADMIN — Medication 500 MILLIGRAM(S): at 21:29

## 2025-08-02 RX ADMIN — Medication 500 MILLIGRAM(S): at 13:11

## 2025-08-02 RX ADMIN — CLONAZEPAM 0.5 MILLIGRAM(S): 0.5 TABLET ORAL at 23:22

## 2025-08-02 RX ADMIN — Medication 0.5 GRAM(S): at 05:54

## 2025-08-02 RX ADMIN — METHOCARBAMOL 500 MILLIGRAM(S): 500 TABLET, FILM COATED ORAL at 12:21

## 2025-08-02 RX ADMIN — CLONAZEPAM 0.5 MILLIGRAM(S): 0.5 TABLET ORAL at 00:05

## 2025-08-02 RX ADMIN — OLANZAPINE 5 MILLIGRAM(S): 10 TABLET ORAL at 05:55

## 2025-08-02 RX ADMIN — Medication 0.5 GRAM(S): at 17:19

## 2025-08-02 RX ADMIN — DABIGATRAN ETEXILATE MESYLATE 150 MILLIGRAM(S): 30 PELLET ORAL at 17:19

## 2025-08-02 RX ADMIN — GABAPENTIN 300 MILLIGRAM(S): 400 CAPSULE ORAL at 21:30

## 2025-08-02 RX ADMIN — QUETIAPINE FUMARATE 25 MILLIGRAM(S): 25 TABLET ORAL at 12:20

## 2025-08-03 LAB
CANDIDA AB TITR SER: SIGNIFICANT CHANGE UP
G VAGINALIS DNA SPEC QL NAA+PROBE: SIGNIFICANT CHANGE UP
T VAGINALIS SPEC QL WET PREP: SIGNIFICANT CHANGE UP

## 2025-08-03 PROCEDURE — 99233 SBSQ HOSP IP/OBS HIGH 50: CPT

## 2025-08-03 RX ORDER — HYDROXYZINE HYDROCHLORIDE 25 MG/1
25 TABLET, FILM COATED ORAL ONCE
Refills: 0 | Status: COMPLETED | OUTPATIENT
Start: 2025-08-03 | End: 2025-08-03

## 2025-08-03 RX ORDER — ACETAMINOPHEN 500 MG/5ML
1000 LIQUID (ML) ORAL ONCE
Refills: 0 | Status: DISCONTINUED | OUTPATIENT
Start: 2025-08-03 | End: 2025-08-04

## 2025-08-03 RX ADMIN — DABIGATRAN ETEXILATE MESYLATE 150 MILLIGRAM(S): 30 PELLET ORAL at 17:22

## 2025-08-03 RX ADMIN — Medication 0.5 GRAM(S): at 05:52

## 2025-08-03 RX ADMIN — Medication 500 MILLIGRAM(S): at 13:11

## 2025-08-03 RX ADMIN — QUETIAPINE FUMARATE 200 MILLIGRAM(S): 25 TABLET ORAL at 21:25

## 2025-08-03 RX ADMIN — Medication 500 MILLIGRAM(S): at 21:25

## 2025-08-03 RX ADMIN — ATORVASTATIN CALCIUM 20 MILLIGRAM(S): 80 TABLET, FILM COATED ORAL at 21:24

## 2025-08-03 RX ADMIN — Medication 25 MILLIGRAM(S): at 23:19

## 2025-08-03 RX ADMIN — Medication 500 MILLIGRAM(S): at 05:52

## 2025-08-03 RX ADMIN — Medication 1 APPLICATION(S): at 13:12

## 2025-08-03 RX ADMIN — OLANZAPINE 5 MILLIGRAM(S): 10 TABLET ORAL at 17:22

## 2025-08-03 RX ADMIN — Medication 0.5 GRAM(S): at 17:22

## 2025-08-03 RX ADMIN — OLANZAPINE 5 MILLIGRAM(S): 10 TABLET ORAL at 05:53

## 2025-08-03 RX ADMIN — DABIGATRAN ETEXILATE MESYLATE 150 MILLIGRAM(S): 30 PELLET ORAL at 05:52

## 2025-08-03 RX ADMIN — GABAPENTIN 300 MILLIGRAM(S): 400 CAPSULE ORAL at 21:24

## 2025-08-03 RX ADMIN — Medication 6 MILLIGRAM(S): at 21:26

## 2025-08-03 RX ADMIN — HYDROXYZINE HYDROCHLORIDE 25 MILLIGRAM(S): 25 TABLET, FILM COATED ORAL at 17:20

## 2025-08-03 RX ADMIN — ESCITALOPRAM OXALATE 10 MILLIGRAM(S): 20 TABLET ORAL at 13:12

## 2025-08-03 RX ADMIN — METHOCARBAMOL 500 MILLIGRAM(S): 500 TABLET, FILM COATED ORAL at 13:12

## 2025-08-03 RX ADMIN — QUETIAPINE FUMARATE 25 MILLIGRAM(S): 25 TABLET ORAL at 13:11

## 2025-08-03 RX ADMIN — Medication 100 MICROGRAM(S): at 05:07

## 2025-08-03 RX ADMIN — Medication 40 MILLIGRAM(S): at 13:11

## 2025-08-04 LAB
ADD ON TEST-SPECIMEN IN LAB: SIGNIFICANT CHANGE UP
B PERT DNA SPEC QL NAA+PROBE: SIGNIFICANT CHANGE UP
B PERT+PARAPERT DNA PNL SPEC NAA+PROBE: SIGNIFICANT CHANGE UP
C PNEUM DNA SPEC QL NAA+PROBE: SIGNIFICANT CHANGE UP
FLUAV AG NPH QL: SIGNIFICANT CHANGE UP
FLUAV SUBTYP SPEC NAA+PROBE: SIGNIFICANT CHANGE UP
FLUBV AG NPH QL: SIGNIFICANT CHANGE UP
FLUBV RNA SPEC QL NAA+PROBE: SIGNIFICANT CHANGE UP
HADV DNA SPEC QL NAA+PROBE: SIGNIFICANT CHANGE UP
HCOV 229E RNA SPEC QL NAA+PROBE: SIGNIFICANT CHANGE UP
HCOV HKU1 RNA SPEC QL NAA+PROBE: SIGNIFICANT CHANGE UP
HCOV NL63 RNA SPEC QL NAA+PROBE: SIGNIFICANT CHANGE UP
HCOV OC43 RNA SPEC QL NAA+PROBE: SIGNIFICANT CHANGE UP
HMPV RNA SPEC QL NAA+PROBE: SIGNIFICANT CHANGE UP
HPIV1 RNA SPEC QL NAA+PROBE: SIGNIFICANT CHANGE UP
HPIV2 RNA SPEC QL NAA+PROBE: SIGNIFICANT CHANGE UP
HPIV3 RNA SPEC QL NAA+PROBE: SIGNIFICANT CHANGE UP
HPIV4 RNA SPEC QL NAA+PROBE: SIGNIFICANT CHANGE UP
M PNEUMO DNA SPEC QL NAA+PROBE: SIGNIFICANT CHANGE UP
RAPID RVP RESULT: SIGNIFICANT CHANGE UP
RSV RNA NPH QL NAA+NON-PROBE: SIGNIFICANT CHANGE UP
RSV RNA SPEC QL NAA+PROBE: SIGNIFICANT CHANGE UP
RV+EV RNA SPEC QL NAA+PROBE: SIGNIFICANT CHANGE UP
SARS-COV-2 RNA SPEC QL NAA+PROBE: SIGNIFICANT CHANGE UP
SARS-COV-2 RNA SPEC QL NAA+PROBE: SIGNIFICANT CHANGE UP
SOURCE RESPIRATORY: SIGNIFICANT CHANGE UP

## 2025-08-04 PROCEDURE — 99233 SBSQ HOSP IP/OBS HIGH 50: CPT

## 2025-08-04 RX ADMIN — Medication 500 MILLIGRAM(S): at 13:18

## 2025-08-04 RX ADMIN — Medication 40 MILLIGRAM(S): at 05:26

## 2025-08-04 RX ADMIN — Medication 1 APPLICATION(S): at 12:22

## 2025-08-04 RX ADMIN — Medication 500 MILLIGRAM(S): at 21:30

## 2025-08-04 RX ADMIN — QUETIAPINE FUMARATE 25 MILLIGRAM(S): 25 TABLET ORAL at 12:23

## 2025-08-04 RX ADMIN — ESCITALOPRAM OXALATE 10 MILLIGRAM(S): 20 TABLET ORAL at 12:23

## 2025-08-04 RX ADMIN — QUETIAPINE FUMARATE 200 MILLIGRAM(S): 25 TABLET ORAL at 21:30

## 2025-08-04 RX ADMIN — Medication 500 MILLIGRAM(S): at 05:26

## 2025-08-04 RX ADMIN — Medication 0.5 GRAM(S): at 17:12

## 2025-08-04 RX ADMIN — ATORVASTATIN CALCIUM 20 MILLIGRAM(S): 80 TABLET, FILM COATED ORAL at 21:31

## 2025-08-04 RX ADMIN — DABIGATRAN ETEXILATE MESYLATE 150 MILLIGRAM(S): 30 PELLET ORAL at 17:12

## 2025-08-04 RX ADMIN — Medication 0.5 GRAM(S): at 05:26

## 2025-08-04 RX ADMIN — Medication 100 MICROGRAM(S): at 05:25

## 2025-08-04 RX ADMIN — METHOCARBAMOL 500 MILLIGRAM(S): 500 TABLET, FILM COATED ORAL at 12:22

## 2025-08-04 RX ADMIN — GABAPENTIN 300 MILLIGRAM(S): 400 CAPSULE ORAL at 21:30

## 2025-08-04 RX ADMIN — DABIGATRAN ETEXILATE MESYLATE 150 MILLIGRAM(S): 30 PELLET ORAL at 05:25

## 2025-08-04 RX ADMIN — OLANZAPINE 5 MILLIGRAM(S): 10 TABLET ORAL at 05:25

## 2025-08-04 RX ADMIN — Medication 6 MILLIGRAM(S): at 21:31

## 2025-08-04 RX ADMIN — OLANZAPINE 5 MILLIGRAM(S): 10 TABLET ORAL at 17:12

## 2025-08-05 ENCOUNTER — TRANSCRIPTION ENCOUNTER (OUTPATIENT)
Age: 40
End: 2025-08-05

## 2025-08-05 PROCEDURE — 99233 SBSQ HOSP IP/OBS HIGH 50: CPT

## 2025-08-05 RX ORDER — DIPHENHYDRAMINE HCL 12.5MG/5ML
50 ELIXIR ORAL EVERY 6 HOURS
Refills: 0 | Status: DISCONTINUED | OUTPATIENT
Start: 2025-08-05 | End: 2025-08-06

## 2025-08-05 RX ORDER — QUETIAPINE FUMARATE 25 MG/1
1.5 TABLET ORAL
Refills: 0 | DISCHARGE
Start: 2025-08-05

## 2025-08-05 RX ADMIN — ATORVASTATIN CALCIUM 20 MILLIGRAM(S): 80 TABLET, FILM COATED ORAL at 21:14

## 2025-08-05 RX ADMIN — GABAPENTIN 300 MILLIGRAM(S): 400 CAPSULE ORAL at 21:14

## 2025-08-05 RX ADMIN — DABIGATRAN ETEXILATE MESYLATE 150 MILLIGRAM(S): 30 PELLET ORAL at 17:17

## 2025-08-05 RX ADMIN — Medication 0.5 GRAM(S): at 05:54

## 2025-08-05 RX ADMIN — ESCITALOPRAM OXALATE 10 MILLIGRAM(S): 20 TABLET ORAL at 12:16

## 2025-08-05 RX ADMIN — Medication 500 MILLIGRAM(S): at 21:14

## 2025-08-05 RX ADMIN — Medication 50 MILLIGRAM(S): at 22:25

## 2025-08-05 RX ADMIN — Medication 500 MILLIGRAM(S): at 05:54

## 2025-08-05 RX ADMIN — Medication 1 APPLICATION(S): at 12:38

## 2025-08-05 RX ADMIN — METHOCARBAMOL 500 MILLIGRAM(S): 500 TABLET, FILM COATED ORAL at 12:16

## 2025-08-05 RX ADMIN — Medication 500 MILLIGRAM(S): at 12:16

## 2025-08-05 RX ADMIN — Medication 40 MILLIGRAM(S): at 05:54

## 2025-08-05 RX ADMIN — Medication 6 MILLIGRAM(S): at 21:15

## 2025-08-05 RX ADMIN — QUETIAPINE FUMARATE 25 MILLIGRAM(S): 25 TABLET ORAL at 12:16

## 2025-08-05 RX ADMIN — OLANZAPINE 5 MILLIGRAM(S): 10 TABLET ORAL at 05:54

## 2025-08-05 RX ADMIN — OLANZAPINE 5 MILLIGRAM(S): 10 TABLET ORAL at 17:17

## 2025-08-05 RX ADMIN — Medication 25 MILLIGRAM(S): at 00:33

## 2025-08-05 RX ADMIN — QUETIAPINE FUMARATE 200 MILLIGRAM(S): 25 TABLET ORAL at 21:14

## 2025-08-05 RX ADMIN — Medication 100 MICROGRAM(S): at 04:07

## 2025-08-05 RX ADMIN — DABIGATRAN ETEXILATE MESYLATE 150 MILLIGRAM(S): 30 PELLET ORAL at 05:54

## 2025-08-05 RX ADMIN — Medication 0.5 GRAM(S): at 17:18

## 2025-08-05 RX ADMIN — Medication 25 MILLIGRAM(S): at 10:10

## 2025-08-06 VITALS
OXYGEN SATURATION: 96 % | RESPIRATION RATE: 18 BRPM | DIASTOLIC BLOOD PRESSURE: 52 MMHG | TEMPERATURE: 98 F | SYSTOLIC BLOOD PRESSURE: 111 MMHG | HEART RATE: 76 BPM

## 2025-08-06 PROCEDURE — 99239 HOSP IP/OBS DSCHRG MGMT >30: CPT

## 2025-08-06 RX ADMIN — Medication 1 APPLICATION(S): at 12:46

## 2025-08-06 RX ADMIN — Medication 50 MILLIGRAM(S): at 17:13

## 2025-08-06 RX ADMIN — Medication 500 MILLIGRAM(S): at 12:41

## 2025-08-06 RX ADMIN — Medication 50 MILLIGRAM(S): at 09:33

## 2025-08-06 RX ADMIN — DABIGATRAN ETEXILATE MESYLATE 150 MILLIGRAM(S): 30 PELLET ORAL at 05:19

## 2025-08-06 RX ADMIN — OLANZAPINE 5 MILLIGRAM(S): 10 TABLET ORAL at 05:18

## 2025-08-06 RX ADMIN — ESCITALOPRAM OXALATE 10 MILLIGRAM(S): 20 TABLET ORAL at 12:42

## 2025-08-06 RX ADMIN — METHOCARBAMOL 500 MILLIGRAM(S): 500 TABLET, FILM COATED ORAL at 12:42

## 2025-08-06 RX ADMIN — Medication 40 MILLIGRAM(S): at 05:19

## 2025-08-06 RX ADMIN — Medication 0.5 GRAM(S): at 05:18

## 2025-08-06 RX ADMIN — Medication 100 MICROGRAM(S): at 04:05

## 2025-08-06 RX ADMIN — Medication 500 MILLIGRAM(S): at 05:18

## 2025-08-06 RX ADMIN — QUETIAPINE FUMARATE 25 MILLIGRAM(S): 25 TABLET ORAL at 12:42

## 2025-08-08 ENCOUNTER — EMERGENCY (EMERGENCY)
Facility: HOSPITAL | Age: 40
LOS: 1 days | End: 2025-08-08
Attending: STUDENT IN AN ORGANIZED HEALTH CARE EDUCATION/TRAINING PROGRAM | Admitting: STUDENT IN AN ORGANIZED HEALTH CARE EDUCATION/TRAINING PROGRAM
Payer: MEDICARE

## 2025-08-08 VITALS
RESPIRATION RATE: 16 BRPM | DIASTOLIC BLOOD PRESSURE: 82 MMHG | TEMPERATURE: 98 F | HEART RATE: 88 BPM | SYSTOLIC BLOOD PRESSURE: 122 MMHG | OXYGEN SATURATION: 100 %

## 2025-08-08 VITALS
RESPIRATION RATE: 18 BRPM | WEIGHT: 293 LBS | TEMPERATURE: 97 F | SYSTOLIC BLOOD PRESSURE: 119 MMHG | HEIGHT: 69 IN | DIASTOLIC BLOOD PRESSURE: 87 MMHG | HEART RATE: 89 BPM | OXYGEN SATURATION: 100 %

## 2025-08-08 DIAGNOSIS — Z90.49 ACQUIRED ABSENCE OF OTHER SPECIFIED PARTS OF DIGESTIVE TRACT: Chronic | ICD-10-CM

## 2025-08-08 LAB
ALBUMIN SERPL ELPH-MCNC: 4.3 G/DL — SIGNIFICANT CHANGE UP (ref 3.3–5)
ALP SERPL-CCNC: 83 U/L — SIGNIFICANT CHANGE UP (ref 40–120)
ALT FLD-CCNC: 29 U/L — SIGNIFICANT CHANGE UP (ref 4–33)
ANION GAP SERPL CALC-SCNC: 14 MMOL/L — SIGNIFICANT CHANGE UP (ref 7–14)
APPEARANCE UR: CLEAR — SIGNIFICANT CHANGE UP
AST SERPL-CCNC: 22 U/L — SIGNIFICANT CHANGE UP (ref 4–32)
BASOPHILS # BLD AUTO: 0.01 K/UL — SIGNIFICANT CHANGE UP (ref 0–0.2)
BASOPHILS NFR BLD AUTO: 0.2 % — SIGNIFICANT CHANGE UP (ref 0–2)
BILIRUB SERPL-MCNC: <0.2 MG/DL — SIGNIFICANT CHANGE UP (ref 0.2–1.2)
BILIRUB UR-MCNC: NEGATIVE — SIGNIFICANT CHANGE UP
BUN SERPL-MCNC: 12 MG/DL — SIGNIFICANT CHANGE UP (ref 7–23)
CALCIUM SERPL-MCNC: 9.6 MG/DL — SIGNIFICANT CHANGE UP (ref 8.4–10.5)
CHLORIDE SERPL-SCNC: 102 MMOL/L — SIGNIFICANT CHANGE UP (ref 98–107)
CO2 SERPL-SCNC: 24 MMOL/L — SIGNIFICANT CHANGE UP (ref 22–31)
COLOR SPEC: YELLOW — SIGNIFICANT CHANGE UP
CREAT SERPL-MCNC: 0.59 MG/DL — SIGNIFICANT CHANGE UP (ref 0.5–1.3)
DIFF PNL FLD: NEGATIVE — SIGNIFICANT CHANGE UP
EGFR: 118 ML/MIN/1.73M2 — SIGNIFICANT CHANGE UP
EGFR: 118 ML/MIN/1.73M2 — SIGNIFICANT CHANGE UP
EOSINOPHIL # BLD AUTO: 0.06 K/UL — SIGNIFICANT CHANGE UP (ref 0–0.5)
EOSINOPHIL NFR BLD AUTO: 1.4 % — SIGNIFICANT CHANGE UP (ref 0–6)
GLUCOSE SERPL-MCNC: 121 MG/DL — HIGH (ref 70–99)
GLUCOSE UR QL: NEGATIVE MG/DL — SIGNIFICANT CHANGE UP
HCT VFR BLD CALC: 38 % — SIGNIFICANT CHANGE UP (ref 34.5–45)
HGB BLD-MCNC: 12.5 G/DL — SIGNIFICANT CHANGE UP (ref 11.5–15.5)
IMM GRANULOCYTES # BLD AUTO: 0.04 K/UL — SIGNIFICANT CHANGE UP (ref 0–0.07)
IMM GRANULOCYTES NFR BLD AUTO: 0.9 % — SIGNIFICANT CHANGE UP (ref 0–0.9)
KETONES UR QL: 15 MG/DL
LEUKOCYTE ESTERASE UR-ACNC: NEGATIVE — SIGNIFICANT CHANGE UP
LYMPHOCYTES # BLD AUTO: 1.79 K/UL — SIGNIFICANT CHANGE UP (ref 1–3.3)
LYMPHOCYTES NFR BLD AUTO: 41.8 % — SIGNIFICANT CHANGE UP (ref 13–44)
MCHC RBC-ENTMCNC: 31 PG — SIGNIFICANT CHANGE UP (ref 27–34)
MCHC RBC-ENTMCNC: 32.9 G/DL — SIGNIFICANT CHANGE UP (ref 32–36)
MCV RBC AUTO: 94.3 FL — SIGNIFICANT CHANGE UP (ref 80–100)
MONOCYTES # BLD AUTO: 0.34 K/UL — SIGNIFICANT CHANGE UP (ref 0–0.9)
MONOCYTES NFR BLD AUTO: 7.9 % — SIGNIFICANT CHANGE UP (ref 2–14)
NEUTROPHILS # BLD AUTO: 2.04 K/UL — SIGNIFICANT CHANGE UP (ref 1.8–7.4)
NEUTROPHILS NFR BLD AUTO: 47.8 % — SIGNIFICANT CHANGE UP (ref 43–77)
NITRITE UR-MCNC: NEGATIVE — SIGNIFICANT CHANGE UP
NRBC # BLD AUTO: 0 K/UL — SIGNIFICANT CHANGE UP (ref 0–0)
NRBC # FLD: 0 K/UL — SIGNIFICANT CHANGE UP (ref 0–0)
NRBC BLD AUTO-RTO: 0 /100 WBCS — SIGNIFICANT CHANGE UP (ref 0–0)
NT-PROBNP SERPL-SCNC: <36 PG/ML — SIGNIFICANT CHANGE UP
PH UR: 6 — SIGNIFICANT CHANGE UP (ref 5–8)
PLATELET # BLD AUTO: 153 K/UL — SIGNIFICANT CHANGE UP (ref 150–400)
PMV BLD: 10.3 FL — SIGNIFICANT CHANGE UP (ref 7–13)
POTASSIUM SERPL-MCNC: 4.3 MMOL/L — SIGNIFICANT CHANGE UP (ref 3.5–5.3)
POTASSIUM SERPL-SCNC: 4.3 MMOL/L — SIGNIFICANT CHANGE UP (ref 3.5–5.3)
PROT SERPL-MCNC: 7.6 G/DL — SIGNIFICANT CHANGE UP (ref 6–8.3)
PROT UR-MCNC: SIGNIFICANT CHANGE UP MG/DL
RBC # BLD: 4.03 M/UL — SIGNIFICANT CHANGE UP (ref 3.8–5.2)
RBC # FLD: 14.9 % — HIGH (ref 10.3–14.5)
SODIUM SERPL-SCNC: 140 MMOL/L — SIGNIFICANT CHANGE UP (ref 135–145)
SP GR SPEC: 1.04 — HIGH (ref 1–1.03)
TROPONIN T, HIGH SENSITIVITY RESULT: <6 NG/L — SIGNIFICANT CHANGE UP
UROBILINOGEN FLD QL: 1 MG/DL — SIGNIFICANT CHANGE UP (ref 0.2–1)
WBC # BLD: 4.28 K/UL — SIGNIFICANT CHANGE UP (ref 3.8–10.5)
WBC # FLD AUTO: 4.28 K/UL — SIGNIFICANT CHANGE UP (ref 3.8–10.5)

## 2025-08-08 PROCEDURE — 71275 CT ANGIOGRAPHY CHEST: CPT | Mod: 26

## 2025-08-08 PROCEDURE — 99285 EMERGENCY DEPT VISIT HI MDM: CPT

## 2025-08-08 PROCEDURE — 71046 X-RAY EXAM CHEST 2 VIEWS: CPT | Mod: 26

## 2025-08-08 PROCEDURE — 36410 VNPNXR 3YR/> PHY/QHP DX/THER: CPT

## 2025-08-08 PROCEDURE — 93970 EXTREMITY STUDY: CPT | Mod: 26

## 2025-08-08 RX ORDER — ACETAMINOPHEN 500 MG/5ML
1000 LIQUID (ML) ORAL ONCE
Refills: 0 | Status: DISCONTINUED | OUTPATIENT
Start: 2025-08-08 | End: 2025-08-08

## 2025-08-08 RX ORDER — DIPHENHYDRAMINE HCL 12.5MG/5ML
50 ELIXIR ORAL ONCE
Refills: 0 | Status: COMPLETED | OUTPATIENT
Start: 2025-08-08 | End: 2025-08-08

## 2025-08-08 RX ADMIN — Medication 50 MILLIGRAM(S): at 06:40

## 2025-08-09 ENCOUNTER — EMERGENCY (EMERGENCY)
Facility: HOSPITAL | Age: 40
LOS: 1 days | End: 2025-08-09
Attending: EMERGENCY MEDICINE | Admitting: EMERGENCY MEDICINE
Payer: MEDICARE

## 2025-08-09 VITALS
HEIGHT: 69 IN | RESPIRATION RATE: 16 BRPM | TEMPERATURE: 99 F | DIASTOLIC BLOOD PRESSURE: 83 MMHG | HEART RATE: 99 BPM | WEIGHT: 293 LBS | OXYGEN SATURATION: 99 % | SYSTOLIC BLOOD PRESSURE: 131 MMHG

## 2025-08-09 VITALS
RESPIRATION RATE: 16 BRPM | OXYGEN SATURATION: 99 % | SYSTOLIC BLOOD PRESSURE: 111 MMHG | HEART RATE: 98 BPM | TEMPERATURE: 99 F | DIASTOLIC BLOOD PRESSURE: 84 MMHG

## 2025-08-09 DIAGNOSIS — Z90.49 ACQUIRED ABSENCE OF OTHER SPECIFIED PARTS OF DIGESTIVE TRACT: Chronic | ICD-10-CM

## 2025-08-09 LAB
ALBUMIN SERPL ELPH-MCNC: 4.1 G/DL — SIGNIFICANT CHANGE UP (ref 3.3–5)
ALP SERPL-CCNC: 67 U/L — SIGNIFICANT CHANGE UP (ref 40–120)
ALT FLD-CCNC: 30 U/L — SIGNIFICANT CHANGE UP (ref 4–33)
ANION GAP SERPL CALC-SCNC: 17 MMOL/L — HIGH (ref 7–14)
APPEARANCE UR: CLEAR — SIGNIFICANT CHANGE UP
AST SERPL-CCNC: 25 U/L — SIGNIFICANT CHANGE UP (ref 4–32)
BACTERIA # UR AUTO: ABNORMAL /HPF
BASOPHILS # BLD AUTO: 0.02 K/UL — SIGNIFICANT CHANGE UP (ref 0–0.2)
BASOPHILS NFR BLD AUTO: 0.4 % — SIGNIFICANT CHANGE UP (ref 0–2)
BILIRUB SERPL-MCNC: 0.3 MG/DL — SIGNIFICANT CHANGE UP (ref 0.2–1.2)
BILIRUB UR-MCNC: NEGATIVE — SIGNIFICANT CHANGE UP
BUN SERPL-MCNC: 10 MG/DL — SIGNIFICANT CHANGE UP (ref 7–23)
CALCIUM SERPL-MCNC: 9.4 MG/DL — SIGNIFICANT CHANGE UP (ref 8.4–10.5)
CAST: 1 /LPF — SIGNIFICANT CHANGE UP (ref 0–4)
CHLORIDE SERPL-SCNC: 103 MMOL/L — SIGNIFICANT CHANGE UP (ref 98–107)
CO2 SERPL-SCNC: 19 MMOL/L — LOW (ref 22–31)
COLOR SPEC: YELLOW — SIGNIFICANT CHANGE UP
CREAT SERPL-MCNC: 0.67 MG/DL — SIGNIFICANT CHANGE UP (ref 0.5–1.3)
DIFF PNL FLD: NEGATIVE — SIGNIFICANT CHANGE UP
EGFR: 115 ML/MIN/1.73M2 — SIGNIFICANT CHANGE UP
EGFR: 115 ML/MIN/1.73M2 — SIGNIFICANT CHANGE UP
EOSINOPHIL # BLD AUTO: 0.03 K/UL — SIGNIFICANT CHANGE UP (ref 0–0.5)
EOSINOPHIL NFR BLD AUTO: 0.6 % — SIGNIFICANT CHANGE UP (ref 0–6)
FLUAV AG NPH QL: SIGNIFICANT CHANGE UP
FLUBV AG NPH QL: SIGNIFICANT CHANGE UP
GLUCOSE SERPL-MCNC: 94 MG/DL — SIGNIFICANT CHANGE UP (ref 70–99)
GLUCOSE UR QL: NEGATIVE MG/DL — SIGNIFICANT CHANGE UP
HCG SERPL-ACNC: <1 MIU/ML — SIGNIFICANT CHANGE UP
HCT VFR BLD CALC: 36.7 % — SIGNIFICANT CHANGE UP (ref 34.5–45)
HGB BLD-MCNC: 12.1 G/DL — SIGNIFICANT CHANGE UP (ref 11.5–15.5)
IMM GRANULOCYTES # BLD AUTO: 0.03 K/UL — SIGNIFICANT CHANGE UP (ref 0–0.07)
IMM GRANULOCYTES NFR BLD AUTO: 0.6 % — SIGNIFICANT CHANGE UP (ref 0–0.9)
KETONES UR QL: ABNORMAL MG/DL
LEUKOCYTE ESTERASE UR-ACNC: NEGATIVE — SIGNIFICANT CHANGE UP
LYMPHOCYTES # BLD AUTO: 1.71 K/UL — SIGNIFICANT CHANGE UP (ref 1–3.3)
LYMPHOCYTES NFR BLD AUTO: 34.8 % — SIGNIFICANT CHANGE UP (ref 13–44)
MCHC RBC-ENTMCNC: 30.7 PG — SIGNIFICANT CHANGE UP (ref 27–34)
MCHC RBC-ENTMCNC: 33 G/DL — SIGNIFICANT CHANGE UP (ref 32–36)
MCV RBC AUTO: 93.1 FL — SIGNIFICANT CHANGE UP (ref 80–100)
MONOCYTES # BLD AUTO: 0.78 K/UL — SIGNIFICANT CHANGE UP (ref 0–0.9)
MONOCYTES NFR BLD AUTO: 15.9 % — HIGH (ref 2–14)
NEUTROPHILS # BLD AUTO: 2.35 K/UL — SIGNIFICANT CHANGE UP (ref 1.8–7.4)
NEUTROPHILS NFR BLD AUTO: 47.7 % — SIGNIFICANT CHANGE UP (ref 43–77)
NITRITE UR-MCNC: NEGATIVE — SIGNIFICANT CHANGE UP
NRBC # BLD AUTO: 0 K/UL — SIGNIFICANT CHANGE UP (ref 0–0)
NRBC # FLD: 0 K/UL — SIGNIFICANT CHANGE UP (ref 0–0)
NRBC BLD AUTO-RTO: 0 /100 WBCS — SIGNIFICANT CHANGE UP (ref 0–0)
PH UR: 7.5 — SIGNIFICANT CHANGE UP (ref 5–8)
PLATELET # BLD AUTO: 146 K/UL — LOW (ref 150–400)
PMV BLD: 10.6 FL — SIGNIFICANT CHANGE UP (ref 7–13)
POTASSIUM SERPL-MCNC: 4.5 MMOL/L — SIGNIFICANT CHANGE UP (ref 3.5–5.3)
POTASSIUM SERPL-SCNC: 4.5 MMOL/L — SIGNIFICANT CHANGE UP (ref 3.5–5.3)
PROT SERPL-MCNC: 7.2 G/DL — SIGNIFICANT CHANGE UP (ref 6–8.3)
PROT UR-MCNC: NEGATIVE MG/DL — SIGNIFICANT CHANGE UP
RBC # BLD: 3.94 M/UL — SIGNIFICANT CHANGE UP (ref 3.8–5.2)
RBC # FLD: 14.7 % — HIGH (ref 10.3–14.5)
RBC CASTS # UR COMP ASSIST: 2 /HPF — SIGNIFICANT CHANGE UP (ref 0–4)
RSV RNA NPH QL NAA+NON-PROBE: SIGNIFICANT CHANGE UP
SARS-COV-2 RNA SPEC QL NAA+PROBE: SIGNIFICANT CHANGE UP
SODIUM SERPL-SCNC: 139 MMOL/L — SIGNIFICANT CHANGE UP (ref 135–145)
SOURCE RESPIRATORY: SIGNIFICANT CHANGE UP
SP GR SPEC: 1.02 — SIGNIFICANT CHANGE UP (ref 1–1.03)
SQUAMOUS # UR AUTO: 28 /HPF — HIGH (ref 0–5)
UROBILINOGEN FLD QL: 0.2 MG/DL — SIGNIFICANT CHANGE UP (ref 0.2–1)
WBC # BLD: 4.92 K/UL — SIGNIFICANT CHANGE UP (ref 3.8–10.5)
WBC # FLD AUTO: 4.92 K/UL — SIGNIFICANT CHANGE UP (ref 3.8–10.5)
WBC UR QL: 4 /HPF — SIGNIFICANT CHANGE UP (ref 0–5)

## 2025-08-09 PROCEDURE — 99284 EMERGENCY DEPT VISIT MOD MDM: CPT

## 2025-08-09 PROCEDURE — 71046 X-RAY EXAM CHEST 2 VIEWS: CPT | Mod: 26

## 2025-08-09 RX ORDER — QUETIAPINE FUMARATE 25 MG/1
200 TABLET ORAL ONCE
Refills: 0 | Status: DISCONTINUED | OUTPATIENT
Start: 2025-08-09 | End: 2025-08-13

## 2025-08-09 RX ORDER — GABAPENTIN 400 MG/1
300 CAPSULE ORAL ONCE
Refills: 0 | Status: COMPLETED | OUTPATIENT
Start: 2025-08-09 | End: 2025-08-09

## 2025-08-09 RX ORDER — IPRATROPIUM BROMIDE AND ALBUTEROL SULFATE .5; 2.5 MG/3ML; MG/3ML
3 SOLUTION RESPIRATORY (INHALATION) ONCE
Refills: 0 | Status: COMPLETED | OUTPATIENT
Start: 2025-08-09 | End: 2025-08-09

## 2025-08-09 RX ORDER — DIPHENHYDRAMINE HCL 12.5MG/5ML
25 ELIXIR ORAL ONCE
Refills: 0 | Status: COMPLETED | OUTPATIENT
Start: 2025-08-09 | End: 2025-08-09

## 2025-08-09 RX ORDER — ACETAMINOPHEN 500 MG/5ML
975 LIQUID (ML) ORAL ONCE
Refills: 0 | Status: COMPLETED | OUTPATIENT
Start: 2025-08-09 | End: 2025-08-09

## 2025-08-09 RX ADMIN — Medication 500 MILLIGRAM(S): at 22:57

## 2025-08-09 RX ADMIN — GABAPENTIN 300 MILLIGRAM(S): 400 CAPSULE ORAL at 22:57

## 2025-08-09 RX ADMIN — Medication 25 MILLIGRAM(S): at 20:54

## 2025-08-09 RX ADMIN — Medication 1 LOZENGE: at 18:35

## 2025-08-09 RX ADMIN — Medication 975 MILLIGRAM(S): at 19:43

## 2025-08-09 RX ADMIN — IPRATROPIUM BROMIDE AND ALBUTEROL SULFATE 3 MILLILITER(S): .5; 2.5 SOLUTION RESPIRATORY (INHALATION) at 18:35

## 2025-08-11 ENCOUNTER — INPATIENT (INPATIENT)
Facility: HOSPITAL | Age: 40
LOS: 2 days | Discharge: HOME | End: 2025-08-14
Attending: HOSPITALIST | Admitting: HOSPITALIST
Payer: MEDICARE

## 2025-08-11 VITALS
SYSTOLIC BLOOD PRESSURE: 123 MMHG | OXYGEN SATURATION: 97 % | HEART RATE: 86 BPM | DIASTOLIC BLOOD PRESSURE: 85 MMHG | HEIGHT: 69 IN | TEMPERATURE: 98 F | RESPIRATION RATE: 15 BRPM

## 2025-08-11 DIAGNOSIS — Z90.49 ACQUIRED ABSENCE OF OTHER SPECIFIED PARTS OF DIGESTIVE TRACT: Chronic | ICD-10-CM

## 2025-08-11 LAB
CULTURE RESULTS: SIGNIFICANT CHANGE UP
SPECIMEN SOURCE: SIGNIFICANT CHANGE UP

## 2025-08-11 PROCEDURE — 99285 EMERGENCY DEPT VISIT HI MDM: CPT | Mod: GC

## 2025-08-12 ENCOUNTER — TRANSCRIPTION ENCOUNTER (OUTPATIENT)
Age: 40
End: 2025-08-12

## 2025-08-12 DIAGNOSIS — F31.9 BIPOLAR DISORDER, UNSPECIFIED: ICD-10-CM

## 2025-08-12 DIAGNOSIS — D68.59 OTHER PRIMARY THROMBOPHILIA: ICD-10-CM

## 2025-08-12 DIAGNOSIS — R19.5 OTHER FECAL ABNORMALITIES: ICD-10-CM

## 2025-08-12 DIAGNOSIS — K92.1 MELENA: ICD-10-CM

## 2025-08-12 DIAGNOSIS — R30.0 DYSURIA: ICD-10-CM

## 2025-08-12 DIAGNOSIS — Z29.9 ENCOUNTER FOR PROPHYLACTIC MEASURES, UNSPECIFIED: ICD-10-CM

## 2025-08-12 DIAGNOSIS — D62 ACUTE POSTHEMORRHAGIC ANEMIA: ICD-10-CM

## 2025-08-12 DIAGNOSIS — F25.9 SCHIZOAFFECTIVE DISORDER, UNSPECIFIED: ICD-10-CM

## 2025-08-12 LAB
ALBUMIN SERPL ELPH-MCNC: 3.7 G/DL — SIGNIFICANT CHANGE UP (ref 3.3–5)
ALP SERPL-CCNC: 66 U/L — SIGNIFICANT CHANGE UP (ref 40–120)
ALT FLD-CCNC: 29 U/L — SIGNIFICANT CHANGE UP (ref 4–33)
ANION GAP SERPL CALC-SCNC: 14 MMOL/L — SIGNIFICANT CHANGE UP (ref 7–14)
APPEARANCE UR: CLEAR — SIGNIFICANT CHANGE UP
APTT BLD: 29.1 SEC — SIGNIFICANT CHANGE UP (ref 26.1–36.8)
AST SERPL-CCNC: 25 U/L — SIGNIFICANT CHANGE UP (ref 4–32)
BACTERIA # UR AUTO: ABNORMAL /HPF
BILIRUB SERPL-MCNC: <0.2 MG/DL — SIGNIFICANT CHANGE UP (ref 0.2–1.2)
BILIRUB UR-MCNC: NEGATIVE — SIGNIFICANT CHANGE UP
BLD GP AB SCN SERPL QL: NEGATIVE — SIGNIFICANT CHANGE UP
BUN SERPL-MCNC: 9 MG/DL — SIGNIFICANT CHANGE UP (ref 7–23)
CALCIUM SERPL-MCNC: 9.2 MG/DL — SIGNIFICANT CHANGE UP (ref 8.4–10.5)
CAST: 0 /LPF — SIGNIFICANT CHANGE UP (ref 0–4)
CHLORIDE SERPL-SCNC: 104 MMOL/L — SIGNIFICANT CHANGE UP (ref 98–107)
CO2 SERPL-SCNC: 19 MMOL/L — LOW (ref 22–31)
COLOR SPEC: YELLOW — SIGNIFICANT CHANGE UP
CREAT SERPL-MCNC: 0.56 MG/DL — SIGNIFICANT CHANGE UP (ref 0.5–1.3)
DIFF PNL FLD: NEGATIVE — SIGNIFICANT CHANGE UP
EGFR: 120 ML/MIN/1.73M2 — SIGNIFICANT CHANGE UP
EGFR: 120 ML/MIN/1.73M2 — SIGNIFICANT CHANGE UP
GLUCOSE BLDC GLUCOMTR-MCNC: 91 MG/DL — SIGNIFICANT CHANGE UP (ref 70–99)
GLUCOSE BLDC GLUCOMTR-MCNC: 93 MG/DL — SIGNIFICANT CHANGE UP (ref 70–99)
GLUCOSE SERPL-MCNC: 93 MG/DL — SIGNIFICANT CHANGE UP (ref 70–99)
GLUCOSE UR QL: NEGATIVE MG/DL — SIGNIFICANT CHANGE UP
HCT VFR BLD CALC: 30.9 % — LOW (ref 34.5–45)
HCT VFR BLD CALC: 33.7 % — LOW (ref 34.5–45)
HGB BLD-MCNC: 10.3 G/DL — LOW (ref 11.5–15.5)
HGB BLD-MCNC: 11.1 G/DL — LOW (ref 11.5–15.5)
INR BLD: 1.02 RATIO — SIGNIFICANT CHANGE UP (ref 0.85–1.16)
KETONES UR QL: ABNORMAL MG/DL
LEUKOCYTE ESTERASE UR-ACNC: NEGATIVE — SIGNIFICANT CHANGE UP
MCHC RBC-ENTMCNC: 30.9 PG — SIGNIFICANT CHANGE UP (ref 27–34)
MCHC RBC-ENTMCNC: 31 PG — SIGNIFICANT CHANGE UP (ref 27–34)
MCHC RBC-ENTMCNC: 32.9 G/DL — SIGNIFICANT CHANGE UP (ref 32–36)
MCHC RBC-ENTMCNC: 33.3 G/DL — SIGNIFICANT CHANGE UP (ref 32–36)
MCV RBC AUTO: 92.8 FL — SIGNIFICANT CHANGE UP (ref 80–100)
MCV RBC AUTO: 94.1 FL — SIGNIFICANT CHANGE UP (ref 80–100)
NITRITE UR-MCNC: NEGATIVE — SIGNIFICANT CHANGE UP
NRBC # BLD AUTO: 0 K/UL — SIGNIFICANT CHANGE UP (ref 0–0)
NRBC # BLD AUTO: 0 K/UL — SIGNIFICANT CHANGE UP (ref 0–0)
NRBC # FLD: 0 K/UL — SIGNIFICANT CHANGE UP (ref 0–0)
NRBC # FLD: 0 K/UL — SIGNIFICANT CHANGE UP (ref 0–0)
NRBC BLD AUTO-RTO: 0 /100 WBCS — SIGNIFICANT CHANGE UP (ref 0–0)
NRBC BLD AUTO-RTO: 0 /100 WBCS — SIGNIFICANT CHANGE UP (ref 0–0)
PH UR: 6.5 — SIGNIFICANT CHANGE UP (ref 5–8)
PLATELET # BLD AUTO: 136 K/UL — LOW (ref 150–400)
PLATELET # BLD AUTO: 154 K/UL — SIGNIFICANT CHANGE UP (ref 150–400)
PMV BLD: 10.7 FL — SIGNIFICANT CHANGE UP (ref 7–13)
PMV BLD: 10.9 FL — SIGNIFICANT CHANGE UP (ref 7–13)
POTASSIUM SERPL-MCNC: 4.6 MMOL/L — SIGNIFICANT CHANGE UP (ref 3.5–5.3)
POTASSIUM SERPL-SCNC: 4.6 MMOL/L — SIGNIFICANT CHANGE UP (ref 3.5–5.3)
PROT SERPL-MCNC: 6.9 G/DL — SIGNIFICANT CHANGE UP (ref 6–8.3)
PROT UR-MCNC: SIGNIFICANT CHANGE UP MG/DL
PROTHROM AB SERPL-ACNC: 11.8 SEC — SIGNIFICANT CHANGE UP (ref 9.9–13.4)
RBC # BLD: 3.33 M/UL — LOW (ref 3.8–5.2)
RBC # BLD: 3.58 M/UL — LOW (ref 3.8–5.2)
RBC # FLD: 14.6 % — HIGH (ref 10.3–14.5)
RBC # FLD: 14.7 % — HIGH (ref 10.3–14.5)
RBC CASTS # UR COMP ASSIST: 3 /HPF — SIGNIFICANT CHANGE UP (ref 0–4)
RH IG SCN BLD-IMP: POSITIVE — SIGNIFICANT CHANGE UP
SODIUM SERPL-SCNC: 137 MMOL/L — SIGNIFICANT CHANGE UP (ref 135–145)
SP GR SPEC: 1.04 — HIGH (ref 1–1.03)
SQUAMOUS # UR AUTO: 6 /HPF — HIGH (ref 0–5)
UROBILINOGEN FLD QL: 1 MG/DL — SIGNIFICANT CHANGE UP (ref 0.2–1)
WBC # BLD: 5.26 K/UL — SIGNIFICANT CHANGE UP (ref 3.8–10.5)
WBC # BLD: 5.36 K/UL — SIGNIFICANT CHANGE UP (ref 3.8–10.5)
WBC # FLD AUTO: 5.26 K/UL — SIGNIFICANT CHANGE UP (ref 3.8–10.5)
WBC # FLD AUTO: 5.36 K/UL — SIGNIFICANT CHANGE UP (ref 3.8–10.5)
WBC UR QL: 1 /HPF — SIGNIFICANT CHANGE UP (ref 0–5)

## 2025-08-12 PROCEDURE — 99232 SBSQ HOSP IP/OBS MODERATE 35: CPT | Mod: GC,25

## 2025-08-12 PROCEDURE — 44360 SMALL BOWEL ENDOSCOPY: CPT | Mod: GC

## 2025-08-12 PROCEDURE — 99223 1ST HOSP IP/OBS HIGH 75: CPT

## 2025-08-12 RX ORDER — ACETAMINOPHEN 500 MG/5ML
650 LIQUID (ML) ORAL EVERY 6 HOURS
Refills: 0 | Status: DISCONTINUED | OUTPATIENT
Start: 2025-08-12 | End: 2025-08-13

## 2025-08-12 RX ORDER — DIPHENHYDRAMINE HCL 12.5MG/5ML
25 ELIXIR ORAL ONCE
Refills: 0 | Status: COMPLETED | OUTPATIENT
Start: 2025-08-12 | End: 2025-08-12

## 2025-08-12 RX ORDER — QUETIAPINE FUMARATE 25 MG/1
300 TABLET ORAL AT BEDTIME
Refills: 0 | Status: DISCONTINUED | OUTPATIENT
Start: 2025-08-12 | End: 2025-08-14

## 2025-08-12 RX ORDER — QUETIAPINE FUMARATE 25 MG/1
25 TABLET ORAL DAILY
Refills: 0 | Status: DISCONTINUED | OUTPATIENT
Start: 2025-08-12 | End: 2025-08-14

## 2025-08-12 RX ORDER — ACETAMINOPHEN 500 MG/5ML
1000 LIQUID (ML) ORAL ONCE
Refills: 0 | Status: COMPLETED | OUTPATIENT
Start: 2025-08-12 | End: 2025-08-12

## 2025-08-12 RX ORDER — SUCRALFATE 1 G
0.5 TABLET ORAL
Refills: 0 | Status: DISCONTINUED | OUTPATIENT
Start: 2025-08-12 | End: 2025-08-14

## 2025-08-12 RX ORDER — OLANZAPINE 10 MG/1
5 TABLET ORAL
Refills: 0 | Status: DISCONTINUED | OUTPATIENT
Start: 2025-08-12 | End: 2025-08-14

## 2025-08-12 RX ORDER — GABAPENTIN 400 MG/1
300 CAPSULE ORAL AT BEDTIME
Refills: 0 | Status: DISCONTINUED | OUTPATIENT
Start: 2025-08-12 | End: 2025-08-14

## 2025-08-12 RX ORDER — MELATONIN 5 MG
6 TABLET ORAL AT BEDTIME
Refills: 0 | Status: DISCONTINUED | OUTPATIENT
Start: 2025-08-12 | End: 2025-08-14

## 2025-08-12 RX ORDER — METHOCARBAMOL 500 MG/1
500 TABLET, FILM COATED ORAL DAILY
Refills: 0 | Status: DISCONTINUED | OUTPATIENT
Start: 2025-08-12 | End: 2025-08-14

## 2025-08-12 RX ORDER — ESCITALOPRAM OXALATE 20 MG/1
10 TABLET ORAL DAILY
Refills: 0 | Status: DISCONTINUED | OUTPATIENT
Start: 2025-08-12 | End: 2025-08-14

## 2025-08-12 RX ORDER — LEVOTHYROXINE SODIUM 300 MCG
100 TABLET ORAL DAILY
Refills: 0 | Status: DISCONTINUED | OUTPATIENT
Start: 2025-08-12 | End: 2025-08-14

## 2025-08-12 RX ORDER — ATORVASTATIN CALCIUM 80 MG/1
20 TABLET, FILM COATED ORAL AT BEDTIME
Refills: 0 | Status: DISCONTINUED | OUTPATIENT
Start: 2025-08-12 | End: 2025-08-14

## 2025-08-12 RX ORDER — OXYCODONE HYDROCHLORIDE 30 MG/1
2.5 TABLET ORAL ONCE
Refills: 0 | Status: DISCONTINUED | OUTPATIENT
Start: 2025-08-12 | End: 2025-08-12

## 2025-08-12 RX ORDER — OXYCODONE HYDROCHLORIDE 30 MG/1
5 TABLET ORAL EVERY 4 HOURS
Refills: 0 | Status: DISCONTINUED | OUTPATIENT
Start: 2025-08-12 | End: 2025-08-14

## 2025-08-12 RX ADMIN — OXYCODONE HYDROCHLORIDE 5 MILLIGRAM(S): 30 TABLET ORAL at 18:16

## 2025-08-12 RX ADMIN — OXYCODONE HYDROCHLORIDE 5 MILLIGRAM(S): 30 TABLET ORAL at 21:21

## 2025-08-12 RX ADMIN — Medication 500 MILLIGRAM(S): at 17:17

## 2025-08-12 RX ADMIN — OXYCODONE HYDROCHLORIDE 2.5 MILLIGRAM(S): 30 TABLET ORAL at 08:05

## 2025-08-12 RX ADMIN — Medication 75 MILLILITER(S): at 09:43

## 2025-08-12 RX ADMIN — Medication 100 MICROGRAM(S): at 07:57

## 2025-08-12 RX ADMIN — OXYCODONE HYDROCHLORIDE 5 MILLIGRAM(S): 30 TABLET ORAL at 17:16

## 2025-08-12 RX ADMIN — Medication 6 MILLIGRAM(S): at 21:55

## 2025-08-12 RX ADMIN — QUETIAPINE FUMARATE 300 MILLIGRAM(S): 25 TABLET ORAL at 21:55

## 2025-08-12 RX ADMIN — Medication 80 MILLIGRAM(S): at 07:02

## 2025-08-12 RX ADMIN — Medication 40 MILLIGRAM(S): at 17:16

## 2025-08-12 RX ADMIN — Medication 500 MILLIGRAM(S): at 21:55

## 2025-08-12 RX ADMIN — OXYCODONE HYDROCHLORIDE 5 MILLIGRAM(S): 30 TABLET ORAL at 22:15

## 2025-08-12 RX ADMIN — Medication 25 MILLIGRAM(S): at 23:42

## 2025-08-12 RX ADMIN — QUETIAPINE FUMARATE 25 MILLIGRAM(S): 25 TABLET ORAL at 17:17

## 2025-08-12 RX ADMIN — ESCITALOPRAM OXALATE 10 MILLIGRAM(S): 20 TABLET ORAL at 17:17

## 2025-08-12 RX ADMIN — Medication 25 MILLIGRAM(S): at 07:57

## 2025-08-12 RX ADMIN — OLANZAPINE 5 MILLIGRAM(S): 10 TABLET ORAL at 17:17

## 2025-08-12 RX ADMIN — ATORVASTATIN CALCIUM 20 MILLIGRAM(S): 80 TABLET, FILM COATED ORAL at 21:56

## 2025-08-12 RX ADMIN — Medication 25 MILLIGRAM(S): at 18:26

## 2025-08-12 RX ADMIN — GABAPENTIN 300 MILLIGRAM(S): 400 CAPSULE ORAL at 21:35

## 2025-08-12 RX ADMIN — Medication 0.5 GRAM(S): at 17:16

## 2025-08-12 RX ADMIN — Medication 400 MILLIGRAM(S): at 02:14

## 2025-08-12 RX ADMIN — METHOCARBAMOL 500 MILLIGRAM(S): 500 TABLET, FILM COATED ORAL at 17:17

## 2025-08-13 LAB
ANION GAP SERPL CALC-SCNC: 15 MMOL/L — HIGH (ref 7–14)
BUN SERPL-MCNC: 11 MG/DL — SIGNIFICANT CHANGE UP (ref 7–23)
CALCIUM SERPL-MCNC: 9 MG/DL — SIGNIFICANT CHANGE UP (ref 8.4–10.5)
CHLORIDE SERPL-SCNC: 104 MMOL/L — SIGNIFICANT CHANGE UP (ref 98–107)
CO2 SERPL-SCNC: 20 MMOL/L — LOW (ref 22–31)
CREAT SERPL-MCNC: 0.71 MG/DL — SIGNIFICANT CHANGE UP (ref 0.5–1.3)
EGFR: 112 ML/MIN/1.73M2 — SIGNIFICANT CHANGE UP
EGFR: 112 ML/MIN/1.73M2 — SIGNIFICANT CHANGE UP
GLUCOSE SERPL-MCNC: 100 MG/DL — HIGH (ref 70–99)
HCT VFR BLD CALC: 32.4 % — LOW (ref 34.5–45)
HGB BLD-MCNC: 10.7 G/DL — LOW (ref 11.5–15.5)
MAGNESIUM SERPL-MCNC: 1.8 MG/DL — SIGNIFICANT CHANGE UP (ref 1.6–2.6)
MCHC RBC-ENTMCNC: 30.9 PG — SIGNIFICANT CHANGE UP (ref 27–34)
MCHC RBC-ENTMCNC: 33 G/DL — SIGNIFICANT CHANGE UP (ref 32–36)
MCV RBC AUTO: 93.6 FL — SIGNIFICANT CHANGE UP (ref 80–100)
NRBC # BLD AUTO: 0 K/UL — SIGNIFICANT CHANGE UP (ref 0–0)
NRBC # FLD: 0 K/UL — SIGNIFICANT CHANGE UP (ref 0–0)
NRBC BLD AUTO-RTO: 0 /100 WBCS — SIGNIFICANT CHANGE UP (ref 0–0)
PHOSPHATE SERPL-MCNC: 4.1 MG/DL — SIGNIFICANT CHANGE UP (ref 2.5–4.5)
PLATELET # BLD AUTO: 133 K/UL — LOW (ref 150–400)
PMV BLD: 10.7 FL — SIGNIFICANT CHANGE UP (ref 7–13)
POTASSIUM SERPL-MCNC: 4.2 MMOL/L — SIGNIFICANT CHANGE UP (ref 3.5–5.3)
POTASSIUM SERPL-SCNC: 4.2 MMOL/L — SIGNIFICANT CHANGE UP (ref 3.5–5.3)
RBC # BLD: 3.46 M/UL — LOW (ref 3.8–5.2)
RBC # FLD: 14.7 % — HIGH (ref 10.3–14.5)
SODIUM SERPL-SCNC: 139 MMOL/L — SIGNIFICANT CHANGE UP (ref 135–145)
WBC # BLD: 4.51 K/UL — SIGNIFICANT CHANGE UP (ref 3.8–10.5)
WBC # FLD AUTO: 4.51 K/UL — SIGNIFICANT CHANGE UP (ref 3.8–10.5)

## 2025-08-13 PROCEDURE — 99232 SBSQ HOSP IP/OBS MODERATE 35: CPT | Mod: GC

## 2025-08-13 RX ORDER — ACETAMINOPHEN 500 MG/5ML
650 LIQUID (ML) ORAL EVERY 6 HOURS
Refills: 0 | Status: DISCONTINUED | OUTPATIENT
Start: 2025-08-13 | End: 2025-08-13

## 2025-08-13 RX ORDER — DIPHENHYDRAMINE HCL 12.5MG/5ML
25 ELIXIR ORAL ONCE
Refills: 0 | Status: DISCONTINUED | OUTPATIENT
Start: 2025-08-13 | End: 2025-08-13

## 2025-08-13 RX ORDER — DABIGATRAN ETEXILATE MESYLATE 30 MG/1
150 PELLET ORAL EVERY 12 HOURS
Refills: 0 | Status: DISCONTINUED | OUTPATIENT
Start: 2025-08-13 | End: 2025-08-14

## 2025-08-13 RX ORDER — DIPHENHYDRAMINE HCL 12.5MG/5ML
50 ELIXIR ORAL ONCE
Refills: 0 | Status: COMPLETED | OUTPATIENT
Start: 2025-08-13 | End: 2025-08-13

## 2025-08-13 RX ORDER — POLYETHYLENE GLYCOL 3350 17 G/17G
17 POWDER, FOR SOLUTION ORAL ONCE
Refills: 0 | Status: COMPLETED | OUTPATIENT
Start: 2025-08-13 | End: 2025-08-13

## 2025-08-13 RX ORDER — ACETAMINOPHEN 500 MG/5ML
650 LIQUID (ML) ORAL EVERY 6 HOURS
Refills: 0 | Status: DISCONTINUED | OUTPATIENT
Start: 2025-08-13 | End: 2025-08-14

## 2025-08-13 RX ADMIN — OXYCODONE HYDROCHLORIDE 5 MILLIGRAM(S): 30 TABLET ORAL at 02:00

## 2025-08-13 RX ADMIN — OXYCODONE HYDROCHLORIDE 5 MILLIGRAM(S): 30 TABLET ORAL at 14:51

## 2025-08-13 RX ADMIN — QUETIAPINE FUMARATE 300 MILLIGRAM(S): 25 TABLET ORAL at 21:34

## 2025-08-13 RX ADMIN — DABIGATRAN ETEXILATE MESYLATE 150 MILLIGRAM(S): 30 PELLET ORAL at 06:16

## 2025-08-13 RX ADMIN — Medication 6 MILLIGRAM(S): at 21:34

## 2025-08-13 RX ADMIN — Medication 100 MICROGRAM(S): at 06:15

## 2025-08-13 RX ADMIN — Medication 40 MILLIGRAM(S): at 06:15

## 2025-08-13 RX ADMIN — OLANZAPINE 5 MILLIGRAM(S): 10 TABLET ORAL at 06:16

## 2025-08-13 RX ADMIN — Medication 1 APPLICATION(S): at 17:52

## 2025-08-13 RX ADMIN — Medication 50 MILLIGRAM(S): at 11:11

## 2025-08-13 RX ADMIN — OXYCODONE HYDROCHLORIDE 5 MILLIGRAM(S): 30 TABLET ORAL at 06:02

## 2025-08-13 RX ADMIN — OXYCODONE HYDROCHLORIDE 5 MILLIGRAM(S): 30 TABLET ORAL at 15:51

## 2025-08-13 RX ADMIN — OXYCODONE HYDROCHLORIDE 5 MILLIGRAM(S): 30 TABLET ORAL at 11:17

## 2025-08-13 RX ADMIN — Medication 500 MILLIGRAM(S): at 13:46

## 2025-08-13 RX ADMIN — OXYCODONE HYDROCHLORIDE 5 MILLIGRAM(S): 30 TABLET ORAL at 07:00

## 2025-08-13 RX ADMIN — ESCITALOPRAM OXALATE 10 MILLIGRAM(S): 20 TABLET ORAL at 11:06

## 2025-08-13 RX ADMIN — QUETIAPINE FUMARATE 25 MILLIGRAM(S): 25 TABLET ORAL at 11:06

## 2025-08-13 RX ADMIN — DABIGATRAN ETEXILATE MESYLATE 150 MILLIGRAM(S): 30 PELLET ORAL at 18:23

## 2025-08-13 RX ADMIN — Medication 500 MILLIGRAM(S): at 06:15

## 2025-08-13 RX ADMIN — METHOCARBAMOL 500 MILLIGRAM(S): 500 TABLET, FILM COATED ORAL at 11:06

## 2025-08-13 RX ADMIN — Medication 0.5 GRAM(S): at 17:48

## 2025-08-13 RX ADMIN — OLANZAPINE 5 MILLIGRAM(S): 10 TABLET ORAL at 17:48

## 2025-08-13 RX ADMIN — GABAPENTIN 300 MILLIGRAM(S): 400 CAPSULE ORAL at 21:34

## 2025-08-13 RX ADMIN — OXYCODONE HYDROCHLORIDE 5 MILLIGRAM(S): 30 TABLET ORAL at 02:55

## 2025-08-13 RX ADMIN — POLYETHYLENE GLYCOL 3350 17 GRAM(S): 17 POWDER, FOR SOLUTION ORAL at 12:44

## 2025-08-13 RX ADMIN — OXYCODONE HYDROCHLORIDE 5 MILLIGRAM(S): 30 TABLET ORAL at 01:20

## 2025-08-13 RX ADMIN — OXYCODONE HYDROCHLORIDE 5 MILLIGRAM(S): 30 TABLET ORAL at 19:57

## 2025-08-13 RX ADMIN — OXYCODONE HYDROCHLORIDE 5 MILLIGRAM(S): 30 TABLET ORAL at 20:25

## 2025-08-13 RX ADMIN — Medication 50 MILLIGRAM(S): at 20:43

## 2025-08-13 RX ADMIN — ATORVASTATIN CALCIUM 20 MILLIGRAM(S): 80 TABLET, FILM COATED ORAL at 21:34

## 2025-08-13 RX ADMIN — Medication 0.5 GRAM(S): at 06:15

## 2025-08-13 RX ADMIN — Medication 500 MILLIGRAM(S): at 21:35

## 2025-08-13 RX ADMIN — OXYCODONE HYDROCHLORIDE 5 MILLIGRAM(S): 30 TABLET ORAL at 10:17

## 2025-08-14 ENCOUNTER — TRANSCRIPTION ENCOUNTER (OUTPATIENT)
Age: 40
End: 2025-08-14

## 2025-08-14 VITALS
OXYGEN SATURATION: 99 % | HEART RATE: 92 BPM | SYSTOLIC BLOOD PRESSURE: 137 MMHG | TEMPERATURE: 98 F | DIASTOLIC BLOOD PRESSURE: 95 MMHG | RESPIRATION RATE: 18 BRPM

## 2025-08-14 LAB
-  AMPICILLIN/SULBACTAM: SIGNIFICANT CHANGE UP
-  AMPICILLIN: SIGNIFICANT CHANGE UP
-  AZTREONAM: SIGNIFICANT CHANGE UP
-  CEFAZOLIN: SIGNIFICANT CHANGE UP
-  CEFEPIME: SIGNIFICANT CHANGE UP
-  CEFTRIAXONE: SIGNIFICANT CHANGE UP
-  CEFUROXIME: SIGNIFICANT CHANGE UP
-  CIPROFLOXACIN: SIGNIFICANT CHANGE UP
-  ERTAPENEM: SIGNIFICANT CHANGE UP
-  GENTAMICIN: SIGNIFICANT CHANGE UP
-  LEVOFLOXACIN: SIGNIFICANT CHANGE UP
-  MEROPENEM: SIGNIFICANT CHANGE UP
-  NITROFURANTOIN: SIGNIFICANT CHANGE UP
-  PIPERACILLIN/TAZOBACTAM: SIGNIFICANT CHANGE UP
-  TOBRAMYCIN: SIGNIFICANT CHANGE UP
-  TRIMETHOPRIM/SULFAMETHOXAZOLE: SIGNIFICANT CHANGE UP
CULTURE RESULTS: ABNORMAL
HCT VFR BLD CALC: 35.3 % — SIGNIFICANT CHANGE UP (ref 34.5–45)
HGB BLD-MCNC: 11.1 G/DL — LOW (ref 11.5–15.5)
IMMATURE PLATELET FRACTION #: 3.5 K/UL — LOW (ref 4.7–11.1)
IMMATURE PLATELET FRACTION %: 3.6 % — SIGNIFICANT CHANGE UP (ref 1.6–4.9)
MANUAL SMEAR VERIFICATION: SIGNIFICANT CHANGE UP
MCHC RBC-ENTMCNC: 30.7 PG — SIGNIFICANT CHANGE UP (ref 27–34)
MCHC RBC-ENTMCNC: 31.4 G/DL — LOW (ref 32–36)
MCV RBC AUTO: 97.5 FL — SIGNIFICANT CHANGE UP (ref 80–100)
METHOD TYPE: SIGNIFICANT CHANGE UP
MRSA PCR RESULT.: SIGNIFICANT CHANGE UP
NRBC # BLD AUTO: 0 K/UL — SIGNIFICANT CHANGE UP (ref 0–0)
NRBC # FLD: 0 K/UL — SIGNIFICANT CHANGE UP (ref 0–0)
NRBC BLD AUTO-RTO: 0 /100 WBCS — SIGNIFICANT CHANGE UP (ref 0–0)
ORGANISM # SPEC MICROSCOPIC CNT: ABNORMAL
ORGANISM # SPEC MICROSCOPIC CNT: ABNORMAL
OVALOCYTES BLD QL SMEAR: SLIGHT — SIGNIFICANT CHANGE UP
PLAT MORPH BLD: NORMAL — SIGNIFICANT CHANGE UP
PLATELET # BLD AUTO: 96 K/UL — LOW (ref 150–400)
PLATELET COUNT - ESTIMATE: ABNORMAL
PMV BLD: 10.9 FL — SIGNIFICANT CHANGE UP (ref 7–13)
RBC # BLD: 3.62 M/UL — LOW (ref 3.8–5.2)
RBC # FLD: 14.6 % — HIGH (ref 10.3–14.5)
RBC BLD AUTO: NORMAL — SIGNIFICANT CHANGE UP
S AUREUS DNA NOSE QL NAA+PROBE: SIGNIFICANT CHANGE UP
SPECIMEN SOURCE: SIGNIFICANT CHANGE UP
WBC # BLD: 4.92 K/UL — SIGNIFICANT CHANGE UP (ref 3.8–10.5)
WBC # FLD AUTO: 4.92 K/UL — SIGNIFICANT CHANGE UP (ref 3.8–10.5)

## 2025-08-14 PROCEDURE — 99239 HOSP IP/OBS DSCHRG MGMT >30: CPT

## 2025-08-14 RX ORDER — DIPHENHYDRAMINE HCL 12.5MG/5ML
25 ELIXIR ORAL EVERY 4 HOURS
Refills: 0 | Status: DISCONTINUED | OUTPATIENT
Start: 2025-08-14 | End: 2025-08-14

## 2025-08-14 RX ORDER — DIPHENHYDRAMINE HCL 12.5MG/5ML
1 ELIXIR ORAL
Qty: 3 | Refills: 0
Start: 2025-08-14 | End: 2025-08-16

## 2025-08-14 RX ORDER — DIPHENHYDRAMINE HCL 12.5MG/5ML
50 ELIXIR ORAL ONCE
Refills: 0 | Status: COMPLETED | OUTPATIENT
Start: 2025-08-14 | End: 2025-08-14

## 2025-08-14 RX ORDER — DIPHENHYDRAMINE HCL 12.5MG/5ML
50 ELIXIR ORAL EVERY 4 HOURS
Refills: 0 | Status: DISCONTINUED | OUTPATIENT
Start: 2025-08-14 | End: 2025-08-14

## 2025-08-14 RX ADMIN — Medication 40 MILLIGRAM(S): at 05:37

## 2025-08-14 RX ADMIN — DABIGATRAN ETEXILATE MESYLATE 150 MILLIGRAM(S): 30 PELLET ORAL at 05:38

## 2025-08-14 RX ADMIN — Medication 500 MILLIGRAM(S): at 05:38

## 2025-08-14 RX ADMIN — Medication 100 MICROGRAM(S): at 05:38

## 2025-08-14 RX ADMIN — OXYCODONE HYDROCHLORIDE 5 MILLIGRAM(S): 30 TABLET ORAL at 11:05

## 2025-08-14 RX ADMIN — ESCITALOPRAM OXALATE 10 MILLIGRAM(S): 20 TABLET ORAL at 11:01

## 2025-08-14 RX ADMIN — QUETIAPINE FUMARATE 25 MILLIGRAM(S): 25 TABLET ORAL at 11:02

## 2025-08-14 RX ADMIN — OLANZAPINE 5 MILLIGRAM(S): 10 TABLET ORAL at 05:38

## 2025-08-14 RX ADMIN — OXYCODONE HYDROCHLORIDE 5 MILLIGRAM(S): 30 TABLET ORAL at 05:42

## 2025-08-14 RX ADMIN — METHOCARBAMOL 500 MILLIGRAM(S): 500 TABLET, FILM COATED ORAL at 11:02

## 2025-08-14 RX ADMIN — Medication 50 MILLIGRAM(S): at 12:10

## 2025-08-14 RX ADMIN — Medication 0.5 GRAM(S): at 05:38

## 2025-08-14 RX ADMIN — Medication 1 APPLICATION(S): at 11:02

## 2025-08-14 RX ADMIN — Medication 50 MILLIGRAM(S): at 06:25

## 2025-08-14 RX ADMIN — Medication 50 MILLIGRAM(S): at 01:19

## 2025-08-16 ENCOUNTER — INPATIENT (INPATIENT)
Facility: HOSPITAL | Age: 40
LOS: 3 days | Discharge: HOME | End: 2025-08-20
Attending: STUDENT IN AN ORGANIZED HEALTH CARE EDUCATION/TRAINING PROGRAM | Admitting: STUDENT IN AN ORGANIZED HEALTH CARE EDUCATION/TRAINING PROGRAM
Payer: MEDICARE

## 2025-08-16 VITALS
HEIGHT: 69 IN | WEIGHT: 293 LBS | TEMPERATURE: 98 F | HEART RATE: 111 BPM | RESPIRATION RATE: 16 BRPM | SYSTOLIC BLOOD PRESSURE: 117 MMHG | DIASTOLIC BLOOD PRESSURE: 85 MMHG | OXYGEN SATURATION: 97 %

## 2025-08-16 DIAGNOSIS — Z90.49 ACQUIRED ABSENCE OF OTHER SPECIFIED PARTS OF DIGESTIVE TRACT: Chronic | ICD-10-CM

## 2025-08-16 PROCEDURE — 99285 EMERGENCY DEPT VISIT HI MDM: CPT

## 2025-08-16 RX ORDER — OXYCODONE HYDROCHLORIDE 30 MG/1
5 TABLET ORAL ONCE
Refills: 0 | Status: DISCONTINUED | OUTPATIENT
Start: 2025-08-16 | End: 2025-08-16

## 2025-08-16 RX ORDER — ACETAMINOPHEN 500 MG/5ML
975 LIQUID (ML) ORAL ONCE
Refills: 0 | Status: COMPLETED | OUTPATIENT
Start: 2025-08-16 | End: 2025-08-16

## 2025-08-17 DIAGNOSIS — E78.5 HYPERLIPIDEMIA, UNSPECIFIED: ICD-10-CM

## 2025-08-17 DIAGNOSIS — K92.1 MELENA: ICD-10-CM

## 2025-08-17 DIAGNOSIS — J45.909 UNSPECIFIED ASTHMA, UNCOMPLICATED: ICD-10-CM

## 2025-08-17 DIAGNOSIS — K58.9 IRRITABLE BOWEL SYNDROME, UNSPECIFIED: ICD-10-CM

## 2025-08-17 DIAGNOSIS — F44.5 CONVERSION DISORDER WITH SEIZURES OR CONVULSIONS: ICD-10-CM

## 2025-08-17 DIAGNOSIS — K21.9 GASTRO-ESOPHAGEAL REFLUX DISEASE WITHOUT ESOPHAGITIS: ICD-10-CM

## 2025-08-17 DIAGNOSIS — E03.9 HYPOTHYROIDISM, UNSPECIFIED: ICD-10-CM

## 2025-08-17 DIAGNOSIS — R10.9 UNSPECIFIED ABDOMINAL PAIN: ICD-10-CM

## 2025-08-17 DIAGNOSIS — F25.0 SCHIZOAFFECTIVE DISORDER, BIPOLAR TYPE: ICD-10-CM

## 2025-08-17 DIAGNOSIS — D68.51 ACTIVATED PROTEIN C RESISTANCE: ICD-10-CM

## 2025-08-17 DIAGNOSIS — N80.9 ENDOMETRIOSIS, UNSPECIFIED: ICD-10-CM

## 2025-08-17 LAB
ALBUMIN SERPL ELPH-MCNC: 4.2 G/DL — SIGNIFICANT CHANGE UP (ref 3.3–5)
ALP SERPL-CCNC: 76 U/L — SIGNIFICANT CHANGE UP (ref 40–120)
ALT FLD-CCNC: 33 U/L — SIGNIFICANT CHANGE UP (ref 4–33)
ANION GAP SERPL CALC-SCNC: 13 MMOL/L — SIGNIFICANT CHANGE UP (ref 7–14)
APTT BLD: 26.2 SEC — SIGNIFICANT CHANGE UP (ref 26.1–36.8)
AST SERPL-CCNC: 17 U/L — SIGNIFICANT CHANGE UP (ref 4–32)
BASOPHILS # BLD AUTO: 0.01 K/UL — SIGNIFICANT CHANGE UP (ref 0–0.2)
BASOPHILS NFR BLD AUTO: 0.2 % — SIGNIFICANT CHANGE UP (ref 0–2)
BILIRUB SERPL-MCNC: <0.2 MG/DL — SIGNIFICANT CHANGE UP (ref 0.2–1.2)
BLD GP AB SCN SERPL QL: NEGATIVE — SIGNIFICANT CHANGE UP
BUN SERPL-MCNC: 10 MG/DL — SIGNIFICANT CHANGE UP (ref 7–23)
CALCIUM SERPL-MCNC: 9.5 MG/DL — SIGNIFICANT CHANGE UP (ref 8.4–10.5)
CHLORIDE SERPL-SCNC: 105 MMOL/L — SIGNIFICANT CHANGE UP (ref 98–107)
CO2 SERPL-SCNC: 23 MMOL/L — SIGNIFICANT CHANGE UP (ref 22–31)
CREAT SERPL-MCNC: 0.6 MG/DL — SIGNIFICANT CHANGE UP (ref 0.5–1.3)
EGFR: 118 ML/MIN/1.73M2 — SIGNIFICANT CHANGE UP
EGFR: 118 ML/MIN/1.73M2 — SIGNIFICANT CHANGE UP
EOSINOPHIL # BLD AUTO: 0.01 K/UL — SIGNIFICANT CHANGE UP (ref 0–0.5)
EOSINOPHIL NFR BLD AUTO: 0.2 % — SIGNIFICANT CHANGE UP (ref 0–6)
GLUCOSE SERPL-MCNC: 124 MG/DL — HIGH (ref 70–99)
HCG SERPL-ACNC: <1 MIU/ML — SIGNIFICANT CHANGE UP
HCT VFR BLD CALC: 32.6 % — LOW (ref 34.5–45)
HCT VFR BLD CALC: 35.8 % — SIGNIFICANT CHANGE UP (ref 34.5–45)
HGB BLD-MCNC: 10.9 G/DL — LOW (ref 11.5–15.5)
HGB BLD-MCNC: 11.5 G/DL — SIGNIFICANT CHANGE UP (ref 11.5–15.5)
IMM GRANULOCYTES # BLD AUTO: 0.02 K/UL — SIGNIFICANT CHANGE UP (ref 0–0.07)
IMM GRANULOCYTES NFR BLD AUTO: 0.4 % — SIGNIFICANT CHANGE UP (ref 0–0.9)
INR BLD: 1.01 RATIO — SIGNIFICANT CHANGE UP (ref 0.85–1.16)
LYMPHOCYTES # BLD AUTO: 1.44 K/UL — SIGNIFICANT CHANGE UP (ref 1–3.3)
LYMPHOCYTES NFR BLD AUTO: 30.7 % — SIGNIFICANT CHANGE UP (ref 13–44)
MAGNESIUM SERPL-MCNC: 1.8 MG/DL — SIGNIFICANT CHANGE UP (ref 1.6–2.6)
MCHC RBC-ENTMCNC: 30.7 PG — SIGNIFICANT CHANGE UP (ref 27–34)
MCHC RBC-ENTMCNC: 30.9 PG — SIGNIFICANT CHANGE UP (ref 27–34)
MCHC RBC-ENTMCNC: 32.1 G/DL — SIGNIFICANT CHANGE UP (ref 32–36)
MCHC RBC-ENTMCNC: 33.4 G/DL — SIGNIFICANT CHANGE UP (ref 32–36)
MCV RBC AUTO: 92.4 FL — SIGNIFICANT CHANGE UP (ref 80–100)
MCV RBC AUTO: 95.7 FL — SIGNIFICANT CHANGE UP (ref 80–100)
MONOCYTES # BLD AUTO: 0.44 K/UL — SIGNIFICANT CHANGE UP (ref 0–0.9)
MONOCYTES NFR BLD AUTO: 9.4 % — SIGNIFICANT CHANGE UP (ref 2–14)
NEUTROPHILS # BLD AUTO: 2.77 K/UL — SIGNIFICANT CHANGE UP (ref 1.8–7.4)
NEUTROPHILS NFR BLD AUTO: 59.1 % — SIGNIFICANT CHANGE UP (ref 43–77)
NRBC # BLD AUTO: 0 K/UL — SIGNIFICANT CHANGE UP (ref 0–0)
NRBC # BLD AUTO: 0 K/UL — SIGNIFICANT CHANGE UP (ref 0–0)
NRBC # FLD: 0 K/UL — SIGNIFICANT CHANGE UP (ref 0–0)
NRBC # FLD: 0 K/UL — SIGNIFICANT CHANGE UP (ref 0–0)
NRBC BLD AUTO-RTO: 0 /100 WBCS — SIGNIFICANT CHANGE UP (ref 0–0)
NRBC BLD AUTO-RTO: 0 /100 WBCS — SIGNIFICANT CHANGE UP (ref 0–0)
PHOSPHATE SERPL-MCNC: 3 MG/DL — SIGNIFICANT CHANGE UP (ref 2.5–4.5)
PLATELET # BLD AUTO: 129 K/UL — LOW (ref 150–400)
PLATELET # BLD AUTO: 163 K/UL — SIGNIFICANT CHANGE UP (ref 150–400)
PMV BLD: 10.4 FL — SIGNIFICANT CHANGE UP (ref 7–13)
PMV BLD: 10.5 FL — SIGNIFICANT CHANGE UP (ref 7–13)
POTASSIUM SERPL-MCNC: 4.4 MMOL/L — SIGNIFICANT CHANGE UP (ref 3.5–5.3)
POTASSIUM SERPL-SCNC: 4.4 MMOL/L — SIGNIFICANT CHANGE UP (ref 3.5–5.3)
PROT SERPL-MCNC: 7.3 G/DL — SIGNIFICANT CHANGE UP (ref 6–8.3)
PROTHROM AB SERPL-ACNC: 12 SEC — SIGNIFICANT CHANGE UP (ref 9.9–13.4)
RBC # BLD: 3.53 M/UL — LOW (ref 3.8–5.2)
RBC # BLD: 3.74 M/UL — LOW (ref 3.8–5.2)
RBC # FLD: 14.9 % — HIGH (ref 10.3–14.5)
RBC # FLD: 15.3 % — HIGH (ref 10.3–14.5)
RH IG SCN BLD-IMP: POSITIVE — SIGNIFICANT CHANGE UP
SODIUM SERPL-SCNC: 141 MMOL/L — SIGNIFICANT CHANGE UP (ref 135–145)
WBC # BLD: 4.69 K/UL — SIGNIFICANT CHANGE UP (ref 3.8–10.5)
WBC # BLD: 5.11 K/UL — SIGNIFICANT CHANGE UP (ref 3.8–10.5)
WBC # FLD AUTO: 4.69 K/UL — SIGNIFICANT CHANGE UP (ref 3.8–10.5)
WBC # FLD AUTO: 5.11 K/UL — SIGNIFICANT CHANGE UP (ref 3.8–10.5)

## 2025-08-17 PROCEDURE — 74174 CTA ABD&PLVS W/CONTRAST: CPT | Mod: 26

## 2025-08-17 PROCEDURE — 99223 1ST HOSP IP/OBS HIGH 75: CPT | Mod: GC

## 2025-08-17 PROCEDURE — 99222 1ST HOSP IP/OBS MODERATE 55: CPT | Mod: GC

## 2025-08-17 RX ORDER — OXYCODONE HYDROCHLORIDE 30 MG/1
2.5 TABLET ORAL ONCE
Refills: 0 | Status: DISCONTINUED | OUTPATIENT
Start: 2025-08-17 | End: 2025-08-17

## 2025-08-17 RX ORDER — NALOXONE HYDROCHLORIDE 0.4 MG/ML
0.4 INJECTION, SOLUTION INTRAMUSCULAR; INTRAVENOUS; SUBCUTANEOUS ONCE
Refills: 0 | Status: DISCONTINUED | OUTPATIENT
Start: 2025-08-17 | End: 2025-08-20

## 2025-08-17 RX ORDER — LEVOTHYROXINE SODIUM 300 MCG
100 TABLET ORAL DAILY
Refills: 0 | Status: DISCONTINUED | OUTPATIENT
Start: 2025-08-17 | End: 2025-08-20

## 2025-08-17 RX ORDER — MELATONIN 5 MG
3 TABLET ORAL AT BEDTIME
Refills: 0 | Status: DISCONTINUED | OUTPATIENT
Start: 2025-08-17 | End: 2025-08-18

## 2025-08-17 RX ORDER — QUETIAPINE FUMARATE 25 MG/1
25 TABLET ORAL DAILY
Refills: 0 | Status: DISCONTINUED | OUTPATIENT
Start: 2025-08-17 | End: 2025-08-20

## 2025-08-17 RX ORDER — POLYETHYLENE GLYCOL 3350 17 G/17G
17 POWDER, FOR SOLUTION ORAL DAILY
Refills: 0 | Status: DISCONTINUED | OUTPATIENT
Start: 2025-08-17 | End: 2025-08-18

## 2025-08-17 RX ORDER — BISACODYL 5 MG
5 TABLET, DELAYED RELEASE (ENTERIC COATED) ORAL DAILY
Refills: 0 | Status: DISCONTINUED | OUTPATIENT
Start: 2025-08-17 | End: 2025-08-20

## 2025-08-17 RX ORDER — DIPHENHYDRAMINE HCL 12.5MG/5ML
50 ELIXIR ORAL DAILY
Refills: 0 | Status: DISCONTINUED | OUTPATIENT
Start: 2025-08-17 | End: 2025-08-20

## 2025-08-17 RX ORDER — OXYCODONE HYDROCHLORIDE 30 MG/1
5 TABLET ORAL EVERY 12 HOURS
Refills: 0 | Status: DISCONTINUED | OUTPATIENT
Start: 2025-08-17 | End: 2025-08-18

## 2025-08-17 RX ORDER — GABAPENTIN 400 MG/1
300 CAPSULE ORAL AT BEDTIME
Refills: 0 | Status: DISCONTINUED | OUTPATIENT
Start: 2025-08-17 | End: 2025-08-20

## 2025-08-17 RX ORDER — SUCRALFATE 1 G
0.5 TABLET ORAL
Refills: 0 | DISCHARGE

## 2025-08-17 RX ORDER — OLANZAPINE 10 MG/1
5 TABLET ORAL
Refills: 0 | DISCHARGE

## 2025-08-17 RX ORDER — ESCITALOPRAM OXALATE 20 MG/1
10 TABLET ORAL DAILY
Refills: 0 | Status: DISCONTINUED | OUTPATIENT
Start: 2025-08-17 | End: 2025-08-20

## 2025-08-17 RX ORDER — MAGNESIUM, ALUMINUM HYDROXIDE 200-200 MG
30 TABLET,CHEWABLE ORAL EVERY 4 HOURS
Refills: 0 | Status: DISCONTINUED | OUTPATIENT
Start: 2025-08-17 | End: 2025-08-20

## 2025-08-17 RX ORDER — OLANZAPINE 10 MG/1
5 TABLET ORAL
Refills: 0 | Status: DISCONTINUED | OUTPATIENT
Start: 2025-08-17 | End: 2025-08-20

## 2025-08-17 RX ORDER — ATORVASTATIN CALCIUM 80 MG/1
20 TABLET, FILM COATED ORAL AT BEDTIME
Refills: 0 | Status: DISCONTINUED | OUTPATIENT
Start: 2025-08-17 | End: 2025-08-20

## 2025-08-17 RX ORDER — DIPHENHYDRAMINE HCL 12.5MG/5ML
50 ELIXIR ORAL ONCE
Refills: 0 | Status: COMPLETED | OUTPATIENT
Start: 2025-08-17 | End: 2025-08-17

## 2025-08-17 RX ORDER — QUETIAPINE FUMARATE 25 MG/1
300 TABLET ORAL AT BEDTIME
Refills: 0 | Status: DISCONTINUED | OUTPATIENT
Start: 2025-08-17 | End: 2025-08-20

## 2025-08-17 RX ORDER — GABAPENTIN 400 MG/1
1 CAPSULE ORAL
Refills: 0 | DISCHARGE

## 2025-08-17 RX ORDER — OXYCODONE HYDROCHLORIDE 30 MG/1
5 TABLET ORAL ONCE
Refills: 0 | Status: DISCONTINUED | OUTPATIENT
Start: 2025-08-17 | End: 2025-08-17

## 2025-08-17 RX ORDER — SENNA 187 MG
2 TABLET ORAL AT BEDTIME
Refills: 0 | Status: DISCONTINUED | OUTPATIENT
Start: 2025-08-17 | End: 2025-08-20

## 2025-08-17 RX ORDER — ACETAMINOPHEN 500 MG/5ML
1000 LIQUID (ML) ORAL ONCE
Refills: 0 | Status: COMPLETED | OUTPATIENT
Start: 2025-08-17 | End: 2025-08-17

## 2025-08-17 RX ORDER — METHOCARBAMOL 500 MG/1
500 TABLET, FILM COATED ORAL DAILY
Refills: 0 | Status: DISCONTINUED | OUTPATIENT
Start: 2025-08-17 | End: 2025-08-20

## 2025-08-17 RX ORDER — METHOCARBAMOL 500 MG/1
1 TABLET, FILM COATED ORAL
Refills: 0 | DISCHARGE

## 2025-08-17 RX ORDER — LIDOCAINE HYDROCHLORIDE 20 MG/ML
1 JELLY TOPICAL DAILY
Refills: 0 | Status: DISCONTINUED | OUTPATIENT
Start: 2025-08-17 | End: 2025-08-19

## 2025-08-17 RX ORDER — ACETAMINOPHEN 500 MG/5ML
650 LIQUID (ML) ORAL EVERY 6 HOURS
Refills: 0 | Status: DISCONTINUED | OUTPATIENT
Start: 2025-08-17 | End: 2025-08-18

## 2025-08-17 RX ADMIN — OXYCODONE HYDROCHLORIDE 5 MILLIGRAM(S): 30 TABLET ORAL at 00:52

## 2025-08-17 RX ADMIN — Medication 50 MILLIGRAM(S): at 17:24

## 2025-08-17 RX ADMIN — GABAPENTIN 300 MILLIGRAM(S): 400 CAPSULE ORAL at 21:17

## 2025-08-17 RX ADMIN — Medication 500 MILLIGRAM(S): at 22:17

## 2025-08-17 RX ADMIN — OXYCODONE HYDROCHLORIDE 5 MILLIGRAM(S): 30 TABLET ORAL at 11:36

## 2025-08-17 RX ADMIN — Medication 40 MILLIGRAM(S): at 02:46

## 2025-08-17 RX ADMIN — OXYCODONE HYDROCHLORIDE 5 MILLIGRAM(S): 30 TABLET ORAL at 17:24

## 2025-08-17 RX ADMIN — Medication 975 MILLIGRAM(S): at 00:51

## 2025-08-17 RX ADMIN — ESCITALOPRAM OXALATE 10 MILLIGRAM(S): 20 TABLET ORAL at 17:25

## 2025-08-17 RX ADMIN — OXYCODONE HYDROCHLORIDE 2.5 MILLIGRAM(S): 30 TABLET ORAL at 04:50

## 2025-08-17 RX ADMIN — Medication 50 MILLIGRAM(S): at 00:51

## 2025-08-17 RX ADMIN — Medication 50 MILLIGRAM(S): at 04:50

## 2025-08-17 RX ADMIN — ATORVASTATIN CALCIUM 20 MILLIGRAM(S): 80 TABLET, FILM COATED ORAL at 21:17

## 2025-08-17 RX ADMIN — POLYETHYLENE GLYCOL 3350 17 GRAM(S): 17 POWDER, FOR SOLUTION ORAL at 17:25

## 2025-08-17 RX ADMIN — OLANZAPINE 5 MILLIGRAM(S): 10 TABLET ORAL at 17:23

## 2025-08-17 RX ADMIN — Medication 2 TABLET(S): at 21:17

## 2025-08-17 RX ADMIN — QUETIAPINE FUMARATE 300 MILLIGRAM(S): 25 TABLET ORAL at 22:17

## 2025-08-18 DIAGNOSIS — K76.0 FATTY (CHANGE OF) LIVER, NOT ELSEWHERE CLASSIFIED: ICD-10-CM

## 2025-08-18 DIAGNOSIS — D75.89 OTHER SPECIFIED DISEASES OF BLOOD AND BLOOD-FORMING ORGANS: ICD-10-CM

## 2025-08-18 DIAGNOSIS — R63.8 OTHER SYMPTOMS AND SIGNS CONCERNING FOOD AND FLUID INTAKE: ICD-10-CM

## 2025-08-18 DIAGNOSIS — R31.9 HEMATURIA, UNSPECIFIED: ICD-10-CM

## 2025-08-18 DIAGNOSIS — I82.409 ACUTE EMBOLISM AND THROMBOSIS OF UNSPECIFIED DEEP VEINS OF UNSPECIFIED LOWER EXTREMITY: ICD-10-CM

## 2025-08-18 DIAGNOSIS — K92.1 MELENA: ICD-10-CM

## 2025-08-18 DIAGNOSIS — K21.9 GASTRO-ESOPHAGEAL REFLUX DISEASE WITHOUT ESOPHAGITIS: ICD-10-CM

## 2025-08-18 DIAGNOSIS — E03.9 HYPOTHYROIDISM, UNSPECIFIED: ICD-10-CM

## 2025-08-18 DIAGNOSIS — I10 ESSENTIAL (PRIMARY) HYPERTENSION: ICD-10-CM

## 2025-08-18 DIAGNOSIS — D68.51 ACTIVATED PROTEIN C RESISTANCE: ICD-10-CM

## 2025-08-18 DIAGNOSIS — E11.9 TYPE 2 DIABETES MELLITUS WITHOUT COMPLICATIONS: ICD-10-CM

## 2025-08-18 DIAGNOSIS — F25.9 SCHIZOAFFECTIVE DISORDER, UNSPECIFIED: ICD-10-CM

## 2025-08-18 LAB
ANION GAP SERPL CALC-SCNC: 14 MMOL/L — SIGNIFICANT CHANGE UP (ref 7–14)
APPEARANCE UR: ABNORMAL
BACTERIA # UR AUTO: ABNORMAL /HPF
BASOPHILS # BLD AUTO: 0.01 K/UL — SIGNIFICANT CHANGE UP (ref 0–0.2)
BASOPHILS NFR BLD AUTO: 0.2 % — SIGNIFICANT CHANGE UP (ref 0–2)
BILIRUB UR-MCNC: NEGATIVE — SIGNIFICANT CHANGE UP
BUN SERPL-MCNC: 14 MG/DL — SIGNIFICANT CHANGE UP (ref 7–23)
CALCIUM SERPL-MCNC: 9 MG/DL — SIGNIFICANT CHANGE UP (ref 8.4–10.5)
CAST: 0 /LPF — SIGNIFICANT CHANGE UP (ref 0–4)
CHLORIDE SERPL-SCNC: 104 MMOL/L — SIGNIFICANT CHANGE UP (ref 98–107)
CO2 SERPL-SCNC: 22 MMOL/L — SIGNIFICANT CHANGE UP (ref 22–31)
COLOR SPEC: YELLOW — SIGNIFICANT CHANGE UP
CREAT SERPL-MCNC: 0.68 MG/DL — SIGNIFICANT CHANGE UP (ref 0.5–1.3)
DIFF PNL FLD: ABNORMAL
EGFR: 114 ML/MIN/1.73M2 — SIGNIFICANT CHANGE UP
EGFR: 114 ML/MIN/1.73M2 — SIGNIFICANT CHANGE UP
EOSINOPHIL # BLD AUTO: 0.05 K/UL — SIGNIFICANT CHANGE UP (ref 0–0.5)
EOSINOPHIL NFR BLD AUTO: 1.2 % — SIGNIFICANT CHANGE UP (ref 0–6)
GLUCOSE SERPL-MCNC: 95 MG/DL — SIGNIFICANT CHANGE UP (ref 70–99)
GLUCOSE UR QL: NEGATIVE MG/DL — SIGNIFICANT CHANGE UP
HCT VFR BLD CALC: 33.1 % — LOW (ref 34.5–45)
HCT VFR BLD CALC: 34.6 % — SIGNIFICANT CHANGE UP (ref 34.5–45)
HGB BLD-MCNC: 10.7 G/DL — LOW (ref 11.5–15.5)
HGB BLD-MCNC: 11.3 G/DL — LOW (ref 11.5–15.5)
IMM GRANULOCYTES # BLD AUTO: 0.04 K/UL — SIGNIFICANT CHANGE UP (ref 0–0.07)
IMM GRANULOCYTES NFR BLD AUTO: 0.9 % — SIGNIFICANT CHANGE UP (ref 0–0.9)
KETONES UR QL: NEGATIVE MG/DL — SIGNIFICANT CHANGE UP
LEUKOCYTE ESTERASE UR-ACNC: NEGATIVE — SIGNIFICANT CHANGE UP
LYMPHOCYTES # BLD AUTO: 1.79 K/UL — SIGNIFICANT CHANGE UP (ref 1–3.3)
LYMPHOCYTES NFR BLD AUTO: 41.6 % — SIGNIFICANT CHANGE UP (ref 13–44)
MCHC RBC-ENTMCNC: 30.5 PG — SIGNIFICANT CHANGE UP (ref 27–34)
MCHC RBC-ENTMCNC: 30.8 PG — SIGNIFICANT CHANGE UP (ref 27–34)
MCHC RBC-ENTMCNC: 32.3 G/DL — SIGNIFICANT CHANGE UP (ref 32–36)
MCHC RBC-ENTMCNC: 32.7 G/DL — SIGNIFICANT CHANGE UP (ref 32–36)
MCV RBC AUTO: 94.3 FL — SIGNIFICANT CHANGE UP (ref 80–100)
MCV RBC AUTO: 94.3 FL — SIGNIFICANT CHANGE UP (ref 80–100)
MONOCYTES # BLD AUTO: 0.46 K/UL — SIGNIFICANT CHANGE UP (ref 0–0.9)
MONOCYTES NFR BLD AUTO: 10.7 % — SIGNIFICANT CHANGE UP (ref 2–14)
NEUTROPHILS # BLD AUTO: 1.95 K/UL — SIGNIFICANT CHANGE UP (ref 1.8–7.4)
NEUTROPHILS NFR BLD AUTO: 45.4 % — SIGNIFICANT CHANGE UP (ref 43–77)
NITRITE UR-MCNC: NEGATIVE — SIGNIFICANT CHANGE UP
NRBC # BLD AUTO: 0 K/UL — SIGNIFICANT CHANGE UP (ref 0–0)
NRBC # BLD AUTO: 0 K/UL — SIGNIFICANT CHANGE UP (ref 0–0)
NRBC # FLD: 0 K/UL — SIGNIFICANT CHANGE UP (ref 0–0)
NRBC # FLD: 0 K/UL — SIGNIFICANT CHANGE UP (ref 0–0)
NRBC BLD AUTO-RTO: 0 /100 WBCS — SIGNIFICANT CHANGE UP (ref 0–0)
NRBC BLD AUTO-RTO: 0 /100 WBCS — SIGNIFICANT CHANGE UP (ref 0–0)
PH UR: 7 — SIGNIFICANT CHANGE UP (ref 5–8)
PLATELET # BLD AUTO: 127 K/UL — LOW (ref 150–400)
PLATELET # BLD AUTO: 145 K/UL — LOW (ref 150–400)
PMV BLD: 10.3 FL — SIGNIFICANT CHANGE UP (ref 7–13)
PMV BLD: 9.6 FL — SIGNIFICANT CHANGE UP (ref 7–13)
POTASSIUM SERPL-MCNC: 4.1 MMOL/L — SIGNIFICANT CHANGE UP (ref 3.5–5.3)
POTASSIUM SERPL-SCNC: 4.1 MMOL/L — SIGNIFICANT CHANGE UP (ref 3.5–5.3)
PROT UR-MCNC: NEGATIVE MG/DL — SIGNIFICANT CHANGE UP
RBC # BLD: 3.51 M/UL — LOW (ref 3.8–5.2)
RBC # BLD: 3.67 M/UL — LOW (ref 3.8–5.2)
RBC # FLD: 14.7 % — HIGH (ref 10.3–14.5)
RBC # FLD: 15.3 % — HIGH (ref 10.3–14.5)
RBC CASTS # UR COMP ASSIST: 20 /HPF — HIGH (ref 0–4)
REVIEW: SIGNIFICANT CHANGE UP
SODIUM SERPL-SCNC: 140 MMOL/L — SIGNIFICANT CHANGE UP (ref 135–145)
SP GR SPEC: 1.01 — SIGNIFICANT CHANGE UP (ref 1–1.03)
SQUAMOUS # UR AUTO: 15 /HPF — HIGH (ref 0–5)
UROBILINOGEN FLD QL: 0.2 MG/DL — SIGNIFICANT CHANGE UP (ref 0.2–1)
WBC # BLD: 4.04 K/UL — SIGNIFICANT CHANGE UP (ref 3.8–10.5)
WBC # BLD: 4.3 K/UL — SIGNIFICANT CHANGE UP (ref 3.8–10.5)
WBC # FLD AUTO: 4.04 K/UL — SIGNIFICANT CHANGE UP (ref 3.8–10.5)
WBC # FLD AUTO: 4.3 K/UL — SIGNIFICANT CHANGE UP (ref 3.8–10.5)
WBC UR QL: 9 /HPF — HIGH (ref 0–5)

## 2025-08-18 PROCEDURE — 99233 SBSQ HOSP IP/OBS HIGH 50: CPT

## 2025-08-18 RX ORDER — OXYCODONE HYDROCHLORIDE 30 MG/1
2.5 TABLET ORAL ONCE
Refills: 0 | Status: DISCONTINUED | OUTPATIENT
Start: 2025-08-18 | End: 2025-08-18

## 2025-08-18 RX ORDER — ACETAMINOPHEN 500 MG/5ML
975 LIQUID (ML) ORAL EVERY 6 HOURS
Refills: 0 | Status: DISCONTINUED | OUTPATIENT
Start: 2025-08-18 | End: 2025-08-20

## 2025-08-18 RX ORDER — POLYETHYLENE GLYCOL 3350 17 G/17G
17 POWDER, FOR SOLUTION ORAL
Refills: 0 | Status: DISCONTINUED | OUTPATIENT
Start: 2025-08-18 | End: 2025-08-20

## 2025-08-18 RX ORDER — MELATONIN 5 MG
6 TABLET ORAL AT BEDTIME
Refills: 0 | Status: DISCONTINUED | OUTPATIENT
Start: 2025-08-18 | End: 2025-08-20

## 2025-08-18 RX ORDER — SUCRALFATE 1 G
1 TABLET ORAL
Refills: 0 | Status: DISCONTINUED | OUTPATIENT
Start: 2025-08-18 | End: 2025-08-20

## 2025-08-18 RX ADMIN — QUETIAPINE FUMARATE 300 MILLIGRAM(S): 25 TABLET ORAL at 21:40

## 2025-08-18 RX ADMIN — OLANZAPINE 5 MILLIGRAM(S): 10 TABLET ORAL at 06:33

## 2025-08-18 RX ADMIN — Medication 975 MILLIGRAM(S): at 12:34

## 2025-08-18 RX ADMIN — Medication 30 MILLILITER(S): at 21:38

## 2025-08-18 RX ADMIN — Medication 1 GRAM(S): at 17:33

## 2025-08-18 RX ADMIN — Medication 500 MILLIGRAM(S): at 15:04

## 2025-08-18 RX ADMIN — Medication 6 MILLIGRAM(S): at 21:39

## 2025-08-18 RX ADMIN — ESCITALOPRAM OXALATE 10 MILLIGRAM(S): 20 TABLET ORAL at 12:34

## 2025-08-18 RX ADMIN — Medication 100 MICROGRAM(S): at 05:00

## 2025-08-18 RX ADMIN — OXYCODONE HYDROCHLORIDE 5 MILLIGRAM(S): 30 TABLET ORAL at 06:45

## 2025-08-18 RX ADMIN — METHOCARBAMOL 500 MILLIGRAM(S): 500 TABLET, FILM COATED ORAL at 12:34

## 2025-08-18 RX ADMIN — Medication 975 MILLIGRAM(S): at 17:35

## 2025-08-18 RX ADMIN — Medication 40 MILLIGRAM(S): at 06:33

## 2025-08-18 RX ADMIN — Medication 2 TABLET(S): at 21:39

## 2025-08-18 RX ADMIN — Medication 500 MILLIGRAM(S): at 06:33

## 2025-08-18 RX ADMIN — OLANZAPINE 5 MILLIGRAM(S): 10 TABLET ORAL at 17:33

## 2025-08-18 RX ADMIN — POLYETHYLENE GLYCOL 3350 17 GRAM(S): 17 POWDER, FOR SOLUTION ORAL at 17:35

## 2025-08-18 RX ADMIN — OXYCODONE HYDROCHLORIDE 5 MILLIGRAM(S): 30 TABLET ORAL at 07:15

## 2025-08-18 RX ADMIN — Medication 50 MILLIGRAM(S): at 06:45

## 2025-08-18 RX ADMIN — Medication 975 MILLIGRAM(S): at 23:32

## 2025-08-18 RX ADMIN — QUETIAPINE FUMARATE 25 MILLIGRAM(S): 25 TABLET ORAL at 12:34

## 2025-08-18 RX ADMIN — OXYCODONE HYDROCHLORIDE 2.5 MILLIGRAM(S): 30 TABLET ORAL at 23:32

## 2025-08-18 RX ADMIN — GABAPENTIN 300 MILLIGRAM(S): 400 CAPSULE ORAL at 21:39

## 2025-08-18 RX ADMIN — ATORVASTATIN CALCIUM 20 MILLIGRAM(S): 80 TABLET, FILM COATED ORAL at 21:39

## 2025-08-18 RX ADMIN — Medication 500 MILLIGRAM(S): at 21:40

## 2025-08-19 LAB
ANION GAP SERPL CALC-SCNC: 14 MMOL/L — SIGNIFICANT CHANGE UP (ref 7–14)
BILIRUB SERPL-MCNC: <0.2 MG/DL — SIGNIFICANT CHANGE UP (ref 0.2–1.2)
BUN SERPL-MCNC: 11 MG/DL — SIGNIFICANT CHANGE UP (ref 7–23)
CALCIUM SERPL-MCNC: 8.9 MG/DL — SIGNIFICANT CHANGE UP (ref 8.4–10.5)
CHLORIDE SERPL-SCNC: 104 MMOL/L — SIGNIFICANT CHANGE UP (ref 98–107)
CO2 SERPL-SCNC: 21 MMOL/L — LOW (ref 22–31)
CREAT SERPL-MCNC: 0.62 MG/DL — SIGNIFICANT CHANGE UP (ref 0.5–1.3)
EGFR: 117 ML/MIN/1.73M2 — SIGNIFICANT CHANGE UP
EGFR: 117 ML/MIN/1.73M2 — SIGNIFICANT CHANGE UP
GLUCOSE SERPL-MCNC: 103 MG/DL — HIGH (ref 70–99)
HCT VFR BLD CALC: 30.6 % — LOW (ref 34.5–45)
HGB BLD-MCNC: 9.9 G/DL — LOW (ref 11.5–15.5)
INR BLD: 0.98 RATIO — SIGNIFICANT CHANGE UP (ref 0.85–1.16)
MAGNESIUM SERPL-MCNC: 2 MG/DL — SIGNIFICANT CHANGE UP (ref 1.6–2.6)
MCHC RBC-ENTMCNC: 30.5 PG — SIGNIFICANT CHANGE UP (ref 27–34)
MCHC RBC-ENTMCNC: 32.4 G/DL — SIGNIFICANT CHANGE UP (ref 32–36)
MCV RBC AUTO: 94.2 FL — SIGNIFICANT CHANGE UP (ref 80–100)
MELD SCORE WITH DIALYSIS: 20 POINTS — SIGNIFICANT CHANGE UP
MELD SCORE WITHOUT DIALYSIS: 6 POINTS — SIGNIFICANT CHANGE UP
NRBC # BLD AUTO: 0 K/UL — SIGNIFICANT CHANGE UP (ref 0–0)
NRBC # FLD: 0 K/UL — SIGNIFICANT CHANGE UP (ref 0–0)
NRBC BLD AUTO-RTO: 0 /100 WBCS — SIGNIFICANT CHANGE UP (ref 0–0)
PHOSPHATE SERPL-MCNC: 3.6 MG/DL — SIGNIFICANT CHANGE UP (ref 2.5–4.5)
PLATELET # BLD AUTO: 120 K/UL — LOW (ref 150–400)
PMV BLD: 11 FL — SIGNIFICANT CHANGE UP (ref 7–13)
POTASSIUM SERPL-MCNC: 3.9 MMOL/L — SIGNIFICANT CHANGE UP (ref 3.5–5.3)
POTASSIUM SERPL-SCNC: 3.9 MMOL/L — SIGNIFICANT CHANGE UP (ref 3.5–5.3)
PROTHROM AB SERPL-ACNC: 11.4 SEC — SIGNIFICANT CHANGE UP (ref 9.9–13.4)
RBC # BLD: 3.25 M/UL — LOW (ref 3.8–5.2)
RBC # FLD: 14.8 % — HIGH (ref 10.3–14.5)
SODIUM SERPL-SCNC: 139 MMOL/L — SIGNIFICANT CHANGE UP (ref 135–145)
WBC # BLD: 3.85 K/UL — SIGNIFICANT CHANGE UP (ref 3.8–10.5)
WBC # FLD AUTO: 3.85 K/UL — SIGNIFICANT CHANGE UP (ref 3.8–10.5)

## 2025-08-19 PROCEDURE — 76770 US EXAM ABDO BACK WALL COMP: CPT | Mod: 26

## 2025-08-19 PROCEDURE — 99233 SBSQ HOSP IP/OBS HIGH 50: CPT

## 2025-08-19 PROCEDURE — 99232 SBSQ HOSP IP/OBS MODERATE 35: CPT | Mod: GC

## 2025-08-19 PROCEDURE — 74178 CT ABD&PLV WO CNTR FLWD CNTR: CPT | Mod: 26

## 2025-08-19 RX ORDER — OXYCODONE HYDROCHLORIDE 30 MG/1
5 TABLET ORAL
Refills: 0 | Status: DISCONTINUED | OUTPATIENT
Start: 2025-08-19 | End: 2025-08-20

## 2025-08-19 RX ORDER — DABIGATRAN ETEXILATE MESYLATE 30 MG/1
150 PELLET ORAL
Refills: 0 | Status: DISCONTINUED | OUTPATIENT
Start: 2025-08-19 | End: 2025-08-20

## 2025-08-19 RX ORDER — DIPHENHYDRAMINE HCL 12.5MG/5ML
50 ELIXIR ORAL ONCE
Refills: 0 | Status: COMPLETED | OUTPATIENT
Start: 2025-08-19 | End: 2025-08-19

## 2025-08-19 RX ORDER — DIPHENHYDRAMINE HCL 12.5MG/5ML
25 ELIXIR ORAL ONCE
Refills: 0 | Status: COMPLETED | OUTPATIENT
Start: 2025-08-19 | End: 2025-08-19

## 2025-08-19 RX ORDER — HYDROXYZINE HYDROCHLORIDE 25 MG/1
50 TABLET, FILM COATED ORAL ONCE
Refills: 0 | Status: COMPLETED | OUTPATIENT
Start: 2025-08-19 | End: 2025-08-19

## 2025-08-19 RX ORDER — OXYCODONE HYDROCHLORIDE 30 MG/1
2.5 TABLET ORAL ONCE
Refills: 0 | Status: DISCONTINUED | OUTPATIENT
Start: 2025-08-19 | End: 2025-08-19

## 2025-08-19 RX ADMIN — METHOCARBAMOL 500 MILLIGRAM(S): 500 TABLET, FILM COATED ORAL at 11:39

## 2025-08-19 RX ADMIN — GABAPENTIN 300 MILLIGRAM(S): 400 CAPSULE ORAL at 21:32

## 2025-08-19 RX ADMIN — Medication 40 MILLIGRAM(S): at 06:06

## 2025-08-19 RX ADMIN — Medication 500 MILLIGRAM(S): at 06:06

## 2025-08-19 RX ADMIN — Medication 500 MILLIGRAM(S): at 14:14

## 2025-08-19 RX ADMIN — OXYCODONE HYDROCHLORIDE 2.5 MILLIGRAM(S): 30 TABLET ORAL at 02:00

## 2025-08-19 RX ADMIN — OXYCODONE HYDROCHLORIDE 5 MILLIGRAM(S): 30 TABLET ORAL at 12:38

## 2025-08-19 RX ADMIN — OXYCODONE HYDROCHLORIDE 5 MILLIGRAM(S): 30 TABLET ORAL at 19:06

## 2025-08-19 RX ADMIN — Medication 50 MILLIGRAM(S): at 00:08

## 2025-08-19 RX ADMIN — Medication 2 TABLET(S): at 21:33

## 2025-08-19 RX ADMIN — Medication 500 MILLIGRAM(S): at 21:32

## 2025-08-19 RX ADMIN — OXYCODONE HYDROCHLORIDE 5 MILLIGRAM(S): 30 TABLET ORAL at 13:08

## 2025-08-19 RX ADMIN — ATORVASTATIN CALCIUM 20 MILLIGRAM(S): 80 TABLET, FILM COATED ORAL at 21:32

## 2025-08-19 RX ADMIN — Medication 100 MICROGRAM(S): at 05:07

## 2025-08-19 RX ADMIN — Medication 975 MILLIGRAM(S): at 18:41

## 2025-08-19 RX ADMIN — ESCITALOPRAM OXALATE 10 MILLIGRAM(S): 20 TABLET ORAL at 11:39

## 2025-08-19 RX ADMIN — Medication 1 GRAM(S): at 06:05

## 2025-08-19 RX ADMIN — Medication 1 APPLICATION(S): at 06:07

## 2025-08-19 RX ADMIN — Medication 6 MILLIGRAM(S): at 21:33

## 2025-08-19 RX ADMIN — HYDROXYZINE HYDROCHLORIDE 50 MILLIGRAM(S): 25 TABLET, FILM COATED ORAL at 22:22

## 2025-08-19 RX ADMIN — DABIGATRAN ETEXILATE MESYLATE 150 MILLIGRAM(S): 30 PELLET ORAL at 19:42

## 2025-08-19 RX ADMIN — Medication 975 MILLIGRAM(S): at 06:05

## 2025-08-19 RX ADMIN — QUETIAPINE FUMARATE 25 MILLIGRAM(S): 25 TABLET ORAL at 11:39

## 2025-08-19 RX ADMIN — Medication 50 MILLIGRAM(S): at 14:15

## 2025-08-19 RX ADMIN — Medication 975 MILLIGRAM(S): at 11:37

## 2025-08-19 RX ADMIN — OXYCODONE HYDROCHLORIDE 5 MILLIGRAM(S): 30 TABLET ORAL at 18:36

## 2025-08-19 RX ADMIN — OLANZAPINE 5 MILLIGRAM(S): 10 TABLET ORAL at 18:40

## 2025-08-19 RX ADMIN — Medication 975 MILLIGRAM(S): at 23:01

## 2025-08-19 RX ADMIN — OLANZAPINE 5 MILLIGRAM(S): 10 TABLET ORAL at 06:06

## 2025-08-19 RX ADMIN — QUETIAPINE FUMARATE 300 MILLIGRAM(S): 25 TABLET ORAL at 21:32

## 2025-08-20 ENCOUNTER — TRANSCRIPTION ENCOUNTER (OUTPATIENT)
Age: 40
End: 2025-08-20

## 2025-08-20 VITALS
HEART RATE: 96 BPM | DIASTOLIC BLOOD PRESSURE: 64 MMHG | OXYGEN SATURATION: 97 % | TEMPERATURE: 98 F | SYSTOLIC BLOOD PRESSURE: 107 MMHG | RESPIRATION RATE: 18 BRPM

## 2025-08-20 LAB
ANION GAP SERPL CALC-SCNC: 13 MMOL/L — SIGNIFICANT CHANGE UP (ref 7–14)
BUN SERPL-MCNC: 11 MG/DL — SIGNIFICANT CHANGE UP (ref 7–23)
CALCIUM SERPL-MCNC: 9 MG/DL — SIGNIFICANT CHANGE UP (ref 8.4–10.5)
CHLORIDE SERPL-SCNC: 104 MMOL/L — SIGNIFICANT CHANGE UP (ref 98–107)
CO2 SERPL-SCNC: 23 MMOL/L — SIGNIFICANT CHANGE UP (ref 22–31)
CREAT SERPL-MCNC: 0.64 MG/DL — SIGNIFICANT CHANGE UP (ref 0.5–1.3)
EGFR: 116 ML/MIN/1.73M2 — SIGNIFICANT CHANGE UP
EGFR: 116 ML/MIN/1.73M2 — SIGNIFICANT CHANGE UP
GLUCOSE SERPL-MCNC: 109 MG/DL — HIGH (ref 70–99)
HCT VFR BLD CALC: 33.2 % — LOW (ref 34.5–45)
HGB BLD-MCNC: 10.8 G/DL — LOW (ref 11.5–15.5)
MAGNESIUM SERPL-MCNC: 1.9 MG/DL — SIGNIFICANT CHANGE UP (ref 1.6–2.6)
MCHC RBC-ENTMCNC: 30.9 PG — SIGNIFICANT CHANGE UP (ref 27–34)
MCHC RBC-ENTMCNC: 32.5 G/DL — SIGNIFICANT CHANGE UP (ref 32–36)
MCV RBC AUTO: 94.9 FL — SIGNIFICANT CHANGE UP (ref 80–100)
MRSA PCR RESULT.: SIGNIFICANT CHANGE UP
NRBC # BLD AUTO: 0 K/UL — SIGNIFICANT CHANGE UP (ref 0–0)
NRBC # FLD: 0 K/UL — SIGNIFICANT CHANGE UP (ref 0–0)
NRBC BLD AUTO-RTO: 0 /100 WBCS — SIGNIFICANT CHANGE UP (ref 0–0)
PHOSPHATE SERPL-MCNC: 3.6 MG/DL — SIGNIFICANT CHANGE UP (ref 2.5–4.5)
PLATELET # BLD AUTO: 133 K/UL — LOW (ref 150–400)
PMV BLD: 10 FL — SIGNIFICANT CHANGE UP (ref 7–13)
POTASSIUM SERPL-MCNC: 4.2 MMOL/L — SIGNIFICANT CHANGE UP (ref 3.5–5.3)
POTASSIUM SERPL-SCNC: 4.2 MMOL/L — SIGNIFICANT CHANGE UP (ref 3.5–5.3)
RBC # BLD: 3.5 M/UL — LOW (ref 3.8–5.2)
RBC # FLD: 14.6 % — HIGH (ref 10.3–14.5)
S AUREUS DNA NOSE QL NAA+PROBE: SIGNIFICANT CHANGE UP
SODIUM SERPL-SCNC: 140 MMOL/L — SIGNIFICANT CHANGE UP (ref 135–145)
WBC # BLD: 4.3 K/UL — SIGNIFICANT CHANGE UP (ref 3.8–10.5)
WBC # FLD AUTO: 4.3 K/UL — SIGNIFICANT CHANGE UP (ref 3.8–10.5)

## 2025-08-20 PROCEDURE — 99239 HOSP IP/OBS DSCHRG MGMT >30: CPT

## 2025-08-20 RX ORDER — MAGNESIUM SULFATE 500 MG/ML
1 SYRINGE (ML) INJECTION ONCE
Refills: 0 | Status: COMPLETED | OUTPATIENT
Start: 2025-08-20 | End: 2025-08-20

## 2025-08-20 RX ORDER — SENNA 187 MG
2 TABLET ORAL
Qty: 0 | Refills: 0 | DISCHARGE
Start: 2025-08-20

## 2025-08-20 RX ORDER — OXYCODONE HYDROCHLORIDE 30 MG/1
5 TABLET ORAL ONCE
Refills: 0 | Status: DISCONTINUED | OUTPATIENT
Start: 2025-08-20 | End: 2025-08-20

## 2025-08-20 RX ADMIN — OXYCODONE HYDROCHLORIDE 5 MILLIGRAM(S): 30 TABLET ORAL at 01:12

## 2025-08-20 RX ADMIN — Medication 40 MILLIGRAM(S): at 06:41

## 2025-08-20 RX ADMIN — Medication 500 MILLIGRAM(S): at 06:41

## 2025-08-20 RX ADMIN — OLANZAPINE 5 MILLIGRAM(S): 10 TABLET ORAL at 06:41

## 2025-08-20 RX ADMIN — Medication 100 MICROGRAM(S): at 05:30

## 2025-08-20 RX ADMIN — ESCITALOPRAM OXALATE 10 MILLIGRAM(S): 20 TABLET ORAL at 12:16

## 2025-08-20 RX ADMIN — OXYCODONE HYDROCHLORIDE 5 MILLIGRAM(S): 30 TABLET ORAL at 12:14

## 2025-08-20 RX ADMIN — OXYCODONE HYDROCHLORIDE 5 MILLIGRAM(S): 30 TABLET ORAL at 00:12

## 2025-08-20 RX ADMIN — Medication 100 GRAM(S): at 12:14

## 2025-08-20 RX ADMIN — DABIGATRAN ETEXILATE MESYLATE 150 MILLIGRAM(S): 30 PELLET ORAL at 06:42

## 2025-08-20 RX ADMIN — Medication 50 MILLIGRAM(S): at 12:14

## 2025-08-20 RX ADMIN — OXYCODONE HYDROCHLORIDE 5 MILLIGRAM(S): 30 TABLET ORAL at 12:44

## 2025-08-20 RX ADMIN — Medication 500 MILLIGRAM(S): at 13:10

## 2025-08-20 RX ADMIN — Medication 975 MILLIGRAM(S): at 06:41

## 2025-08-20 RX ADMIN — METHOCARBAMOL 500 MILLIGRAM(S): 500 TABLET, FILM COATED ORAL at 12:17

## 2025-08-20 RX ADMIN — QUETIAPINE FUMARATE 25 MILLIGRAM(S): 25 TABLET ORAL at 12:16

## 2025-08-20 RX ADMIN — Medication 1 APPLICATION(S): at 12:14

## 2025-08-20 RX ADMIN — Medication 1 GRAM(S): at 06:41

## 2025-08-20 RX ADMIN — Medication 975 MILLIGRAM(S): at 12:15

## 2025-08-30 ENCOUNTER — EMERGENCY (EMERGENCY)
Facility: HOSPITAL | Age: 40
LOS: 1 days | End: 2025-08-30
Attending: EMERGENCY MEDICINE | Admitting: EMERGENCY MEDICINE
Payer: MEDICARE

## 2025-08-30 VITALS
OXYGEN SATURATION: 97 % | DIASTOLIC BLOOD PRESSURE: 94 MMHG | SYSTOLIC BLOOD PRESSURE: 139 MMHG | RESPIRATION RATE: 17 BRPM | HEART RATE: 111 BPM | TEMPERATURE: 98 F

## 2025-08-30 VITALS
OXYGEN SATURATION: 99 % | DIASTOLIC BLOOD PRESSURE: 95 MMHG | SYSTOLIC BLOOD PRESSURE: 136 MMHG | HEART RATE: 111 BPM | HEIGHT: 69 IN | TEMPERATURE: 98 F | WEIGHT: 293 LBS | RESPIRATION RATE: 18 BRPM

## 2025-08-30 DIAGNOSIS — Z90.49 ACQUIRED ABSENCE OF OTHER SPECIFIED PARTS OF DIGESTIVE TRACT: Chronic | ICD-10-CM

## 2025-08-30 LAB
ALBUMIN SERPL ELPH-MCNC: 4.5 G/DL — SIGNIFICANT CHANGE UP (ref 3.3–5)
ALP SERPL-CCNC: 70 U/L — SIGNIFICANT CHANGE UP (ref 40–120)
ALT FLD-CCNC: 50 U/L — HIGH (ref 4–33)
ANION GAP SERPL CALC-SCNC: 15 MMOL/L — HIGH (ref 7–14)
APTT BLD: 31.2 SEC — SIGNIFICANT CHANGE UP (ref 26.1–36.8)
AST SERPL-CCNC: 30 U/L — SIGNIFICANT CHANGE UP (ref 4–32)
BASOPHILS # BLD AUTO: 0.01 K/UL — SIGNIFICANT CHANGE UP (ref 0–0.2)
BASOPHILS NFR BLD AUTO: 0.3 % — SIGNIFICANT CHANGE UP (ref 0–2)
BILIRUB SERPL-MCNC: 0.3 MG/DL — SIGNIFICANT CHANGE UP (ref 0.2–1.2)
BLD GP AB SCN SERPL QL: NEGATIVE — SIGNIFICANT CHANGE UP
BUN SERPL-MCNC: 9 MG/DL — SIGNIFICANT CHANGE UP (ref 7–23)
CALCIUM SERPL-MCNC: 9.8 MG/DL — SIGNIFICANT CHANGE UP (ref 8.4–10.5)
CHLORIDE SERPL-SCNC: 99 MMOL/L — SIGNIFICANT CHANGE UP (ref 98–107)
CO2 SERPL-SCNC: 22 MMOL/L — SIGNIFICANT CHANGE UP (ref 22–31)
CREAT SERPL-MCNC: 0.86 MG/DL — SIGNIFICANT CHANGE UP (ref 0.5–1.3)
EGFR: 89 ML/MIN/1.73M2 — SIGNIFICANT CHANGE UP
EGFR: 89 ML/MIN/1.73M2 — SIGNIFICANT CHANGE UP
EOSINOPHIL # BLD AUTO: 0.02 K/UL — SIGNIFICANT CHANGE UP (ref 0–0.5)
EOSINOPHIL NFR BLD AUTO: 0.5 % — SIGNIFICANT CHANGE UP (ref 0–6)
GAS PNL BLDV: SIGNIFICANT CHANGE UP
GLUCOSE SERPL-MCNC: 88 MG/DL — SIGNIFICANT CHANGE UP (ref 70–99)
HCG SERPL-ACNC: <1 MIU/ML — SIGNIFICANT CHANGE UP
HCT VFR BLD CALC: 36.1 % — SIGNIFICANT CHANGE UP (ref 34.5–45)
HGB BLD-MCNC: 12 G/DL — SIGNIFICANT CHANGE UP (ref 11.5–15.5)
IMM GRANULOCYTES # BLD AUTO: 0.04 K/UL — SIGNIFICANT CHANGE UP (ref 0–0.07)
IMM GRANULOCYTES NFR BLD AUTO: 1 % — HIGH (ref 0–0.9)
INR BLD: 1.05 RATIO — SIGNIFICANT CHANGE UP (ref 0.85–1.16)
LIDOCAIN IGE QN: 23 U/L — SIGNIFICANT CHANGE UP (ref 7–60)
LYMPHOCYTES # BLD AUTO: 1.24 K/UL — SIGNIFICANT CHANGE UP (ref 1–3.3)
LYMPHOCYTES NFR BLD AUTO: 32.5 % — SIGNIFICANT CHANGE UP (ref 13–44)
MCHC RBC-ENTMCNC: 31.2 PG — SIGNIFICANT CHANGE UP (ref 27–34)
MCHC RBC-ENTMCNC: 33.2 G/DL — SIGNIFICANT CHANGE UP (ref 32–36)
MCV RBC AUTO: 93.8 FL — SIGNIFICANT CHANGE UP (ref 80–100)
MONOCYTES # BLD AUTO: 0.4 K/UL — SIGNIFICANT CHANGE UP (ref 0–0.9)
MONOCYTES NFR BLD AUTO: 10.5 % — SIGNIFICANT CHANGE UP (ref 2–14)
NEUTROPHILS # BLD AUTO: 2.11 K/UL — SIGNIFICANT CHANGE UP (ref 1.8–7.4)
NEUTROPHILS NFR BLD AUTO: 55.2 % — SIGNIFICANT CHANGE UP (ref 43–77)
NRBC # BLD AUTO: 0 K/UL — SIGNIFICANT CHANGE UP (ref 0–0)
NRBC # FLD: 0 K/UL — SIGNIFICANT CHANGE UP (ref 0–0)
NRBC BLD AUTO-RTO: 0 /100 WBCS — SIGNIFICANT CHANGE UP (ref 0–0)
PLATELET # BLD AUTO: 155 K/UL — SIGNIFICANT CHANGE UP (ref 150–400)
PMV BLD: 10.6 FL — SIGNIFICANT CHANGE UP (ref 7–13)
POTASSIUM SERPL-MCNC: 4 MMOL/L — SIGNIFICANT CHANGE UP (ref 3.5–5.3)
POTASSIUM SERPL-SCNC: 4 MMOL/L — SIGNIFICANT CHANGE UP (ref 3.5–5.3)
PROT SERPL-MCNC: 8 G/DL — SIGNIFICANT CHANGE UP (ref 6–8.3)
PROTHROM AB SERPL-ACNC: 12.2 SEC — SIGNIFICANT CHANGE UP (ref 9.9–13.4)
RBC # BLD: 3.85 M/UL — SIGNIFICANT CHANGE UP (ref 3.8–5.2)
RBC # FLD: 15.5 % — HIGH (ref 10.3–14.5)
RH IG SCN BLD-IMP: POSITIVE — SIGNIFICANT CHANGE UP
SODIUM SERPL-SCNC: 136 MMOL/L — SIGNIFICANT CHANGE UP (ref 135–145)
WBC # BLD: 3.82 K/UL — SIGNIFICANT CHANGE UP (ref 3.8–10.5)
WBC # FLD AUTO: 3.82 K/UL — SIGNIFICANT CHANGE UP (ref 3.8–10.5)

## 2025-08-30 PROCEDURE — 76937 US GUIDE VASCULAR ACCESS: CPT | Mod: 26

## 2025-08-30 PROCEDURE — 74177 CT ABD & PELVIS W/CONTRAST: CPT | Mod: 26

## 2025-08-30 PROCEDURE — 99285 EMERGENCY DEPT VISIT HI MDM: CPT

## 2025-08-30 RX ORDER — DIPHENHYDRAMINE HCL 12.5MG/5ML
50 ELIXIR ORAL ONCE
Refills: 0 | Status: COMPLETED | OUTPATIENT
Start: 2025-08-30 | End: 2025-08-30

## 2025-08-30 RX ORDER — ACETAMINOPHEN 500 MG/5ML
1000 LIQUID (ML) ORAL ONCE
Refills: 0 | Status: COMPLETED | OUTPATIENT
Start: 2025-08-30 | End: 2025-08-30

## 2025-08-30 RX ORDER — METHOCARBAMOL 500 MG/1
750 TABLET, FILM COATED ORAL ONCE
Refills: 0 | Status: COMPLETED | OUTPATIENT
Start: 2025-08-30 | End: 2025-08-30

## 2025-08-30 RX ADMIN — Medication 4 MILLIGRAM(S): at 17:07

## 2025-08-30 RX ADMIN — Medication 400 MILLIGRAM(S): at 12:57

## 2025-08-30 RX ADMIN — Medication 204 MILLIGRAM(S): at 14:01

## 2025-08-30 RX ADMIN — Medication 6 MILLIGRAM(S): at 13:59

## 2025-08-30 RX ADMIN — METHOCARBAMOL 750 MILLIGRAM(S): 500 TABLET, FILM COATED ORAL at 19:29

## 2025-08-31 ENCOUNTER — EMERGENCY (EMERGENCY)
Facility: HOSPITAL | Age: 40
LOS: 1 days | End: 2025-08-31
Attending: EMERGENCY MEDICINE | Admitting: EMERGENCY MEDICINE
Payer: MEDICARE

## 2025-08-31 VITALS
SYSTOLIC BLOOD PRESSURE: 127 MMHG | RESPIRATION RATE: 17 BRPM | TEMPERATURE: 98 F | DIASTOLIC BLOOD PRESSURE: 84 MMHG | HEIGHT: 69 IN | OXYGEN SATURATION: 98 % | WEIGHT: 293 LBS | HEART RATE: 98 BPM

## 2025-08-31 DIAGNOSIS — Z90.49 ACQUIRED ABSENCE OF OTHER SPECIFIED PARTS OF DIGESTIVE TRACT: Chronic | ICD-10-CM

## 2025-08-31 PROCEDURE — 99285 EMERGENCY DEPT VISIT HI MDM: CPT

## 2025-09-01 LAB
ALBUMIN SERPL ELPH-MCNC: 4.3 G/DL — SIGNIFICANT CHANGE UP (ref 3.3–5)
ALP SERPL-CCNC: 59 U/L — SIGNIFICANT CHANGE UP (ref 40–120)
ALT FLD-CCNC: 44 U/L — HIGH (ref 4–33)
ANION GAP SERPL CALC-SCNC: 13 MMOL/L — SIGNIFICANT CHANGE UP (ref 7–14)
APPEARANCE UR: ABNORMAL
APTT BLD: 33.8 SEC — SIGNIFICANT CHANGE UP (ref 26.1–36.8)
AST SERPL-CCNC: 21 U/L — SIGNIFICANT CHANGE UP (ref 4–32)
BACTERIA # UR AUTO: ABNORMAL /HPF
BASOPHILS # BLD AUTO: 0.02 K/UL — SIGNIFICANT CHANGE UP (ref 0–0.2)
BASOPHILS NFR BLD AUTO: 0.4 % — SIGNIFICANT CHANGE UP (ref 0–2)
BILIRUB SERPL-MCNC: 0.3 MG/DL — SIGNIFICANT CHANGE UP (ref 0.2–1.2)
BILIRUB UR-MCNC: NEGATIVE — SIGNIFICANT CHANGE UP
BUN SERPL-MCNC: 10 MG/DL — SIGNIFICANT CHANGE UP (ref 7–23)
CALCIUM SERPL-MCNC: 9.5 MG/DL — SIGNIFICANT CHANGE UP (ref 8.4–10.5)
CAST: 0 /LPF — SIGNIFICANT CHANGE UP (ref 0–4)
CHLORIDE SERPL-SCNC: 101 MMOL/L — SIGNIFICANT CHANGE UP (ref 98–107)
CK MB BLD-MCNC: 1.9 % — SIGNIFICANT CHANGE UP (ref 0–2.5)
CK MB CFR SERPL CALC: 2.1 NG/ML — SIGNIFICANT CHANGE UP
CK SERPL-CCNC: 109 U/L — SIGNIFICANT CHANGE UP (ref 25–170)
CO2 SERPL-SCNC: 22 MMOL/L — SIGNIFICANT CHANGE UP (ref 22–31)
COLOR SPEC: SIGNIFICANT CHANGE UP
CREAT SERPL-MCNC: 0.81 MG/DL — SIGNIFICANT CHANGE UP (ref 0.5–1.3)
DIFF PNL FLD: ABNORMAL
EGFR: 95 ML/MIN/1.73M2 — SIGNIFICANT CHANGE UP
EGFR: 95 ML/MIN/1.73M2 — SIGNIFICANT CHANGE UP
EOSINOPHIL # BLD AUTO: 0.02 K/UL — SIGNIFICANT CHANGE UP (ref 0–0.5)
EOSINOPHIL NFR BLD AUTO: 0.4 % — SIGNIFICANT CHANGE UP (ref 0–6)
GLUCOSE SERPL-MCNC: 86 MG/DL — SIGNIFICANT CHANGE UP (ref 70–99)
GLUCOSE UR QL: NEGATIVE MG/DL — SIGNIFICANT CHANGE UP
HCG SERPL-ACNC: <1 MIU/ML — SIGNIFICANT CHANGE UP
HCT VFR BLD CALC: 30.3 % — LOW (ref 34.5–45)
HGB BLD-MCNC: 10.1 G/DL — LOW (ref 11.5–15.5)
IMM GRANULOCYTES # BLD AUTO: 0.03 K/UL — SIGNIFICANT CHANGE UP (ref 0–0.07)
IMM GRANULOCYTES NFR BLD AUTO: 0.6 % — SIGNIFICANT CHANGE UP (ref 0–0.9)
INR BLD: 1.1 RATIO — SIGNIFICANT CHANGE UP (ref 0.85–1.16)
KETONES UR QL: 15 MG/DL
LEUKOCYTE ESTERASE UR-ACNC: NEGATIVE — SIGNIFICANT CHANGE UP
LYMPHOCYTES # BLD AUTO: 1.95 K/UL — SIGNIFICANT CHANGE UP (ref 1–3.3)
LYMPHOCYTES NFR BLD AUTO: 39.7 % — SIGNIFICANT CHANGE UP (ref 13–44)
MCHC RBC-ENTMCNC: 30.4 PG — SIGNIFICANT CHANGE UP (ref 27–34)
MCHC RBC-ENTMCNC: 33.3 G/DL — SIGNIFICANT CHANGE UP (ref 32–36)
MCV RBC AUTO: 91.3 FL — SIGNIFICANT CHANGE UP (ref 80–100)
MONOCYTES # BLD AUTO: 0.67 K/UL — SIGNIFICANT CHANGE UP (ref 0–0.9)
MONOCYTES NFR BLD AUTO: 13.6 % — SIGNIFICANT CHANGE UP (ref 2–14)
NEUTROPHILS # BLD AUTO: 2.22 K/UL — SIGNIFICANT CHANGE UP (ref 1.8–7.4)
NEUTROPHILS NFR BLD AUTO: 45.3 % — SIGNIFICANT CHANGE UP (ref 43–77)
NITRITE UR-MCNC: NEGATIVE — SIGNIFICANT CHANGE UP
NRBC # BLD AUTO: 0 K/UL — SIGNIFICANT CHANGE UP (ref 0–0)
NRBC # FLD: 0 K/UL — SIGNIFICANT CHANGE UP (ref 0–0)
NRBC BLD AUTO-RTO: 0 /100 WBCS — SIGNIFICANT CHANGE UP (ref 0–0)
PH UR: 5.5 — SIGNIFICANT CHANGE UP (ref 5–8)
PLATELET # BLD AUTO: 147 K/UL — LOW (ref 150–400)
PMV BLD: 10.9 FL — SIGNIFICANT CHANGE UP (ref 7–13)
POTASSIUM SERPL-MCNC: 4 MMOL/L — SIGNIFICANT CHANGE UP (ref 3.5–5.3)
POTASSIUM SERPL-SCNC: 4 MMOL/L — SIGNIFICANT CHANGE UP (ref 3.5–5.3)
PROT SERPL-MCNC: 7.2 G/DL — SIGNIFICANT CHANGE UP (ref 6–8.3)
PROT UR-MCNC: 100 MG/DL
PROTHROM AB SERPL-ACNC: 12.8 SEC — SIGNIFICANT CHANGE UP (ref 9.9–13.4)
RBC # BLD: 3.32 M/UL — LOW (ref 3.8–5.2)
RBC # FLD: 15.4 % — HIGH (ref 10.3–14.5)
RBC CASTS # UR COMP ASSIST: 13 /HPF — HIGH (ref 0–4)
REVIEW: SIGNIFICANT CHANGE UP
SODIUM SERPL-SCNC: 136 MMOL/L — SIGNIFICANT CHANGE UP (ref 135–145)
SP GR SPEC: 1.04 — HIGH (ref 1–1.03)
SQUAMOUS # UR AUTO: 53 /HPF — HIGH (ref 0–5)
UROBILINOGEN FLD QL: 1 MG/DL — SIGNIFICANT CHANGE UP (ref 0.2–1)
WBC # BLD: 4.91 K/UL — SIGNIFICANT CHANGE UP (ref 3.8–10.5)
WBC # FLD AUTO: 4.91 K/UL — SIGNIFICANT CHANGE UP (ref 3.8–10.5)
WBC UR QL: 2 /HPF — SIGNIFICANT CHANGE UP (ref 0–5)

## 2025-09-01 RX ORDER — ONDANSETRON HCL/PF 4 MG/2 ML
4 VIAL (ML) INJECTION ONCE
Refills: 0 | Status: DISCONTINUED | OUTPATIENT
Start: 2025-09-01 | End: 2025-09-01

## 2025-09-01 RX ORDER — OXYCODONE HYDROCHLORIDE 30 MG/1
7.5 TABLET ORAL ONCE
Refills: 0 | Status: DISCONTINUED | OUTPATIENT
Start: 2025-09-01 | End: 2025-09-01

## 2025-09-01 RX ORDER — ACETAMINOPHEN 500 MG/5ML
1000 LIQUID (ML) ORAL ONCE
Refills: 0 | Status: DISCONTINUED | OUTPATIENT
Start: 2025-09-01 | End: 2025-09-01

## 2025-09-01 RX ORDER — ACETAMINOPHEN 500 MG/5ML
975 LIQUID (ML) ORAL ONCE
Refills: 0 | Status: COMPLETED | OUTPATIENT
Start: 2025-09-01 | End: 2025-09-01

## 2025-09-01 RX ORDER — MIDAZOLAM IN 0.9 % SOD.CHLORID 1 MG/ML
2 PLASTIC BAG, INJECTION (ML) INTRAVENOUS ONCE
Refills: 0 | Status: DISCONTINUED | OUTPATIENT
Start: 2025-09-01 | End: 2025-09-01

## 2025-09-01 RX ADMIN — OXYCODONE HYDROCHLORIDE 7.5 MILLIGRAM(S): 30 TABLET ORAL at 04:10

## 2025-09-01 RX ADMIN — Medication 2 MILLIGRAM(S): at 05:24

## 2025-09-05 LAB
CULTURE RESULTS: ABNORMAL
SPECIMEN SOURCE: SIGNIFICANT CHANGE UP

## (undated) DEVICE — ELCTR ECG CONDUCTIVE ADHESIVE

## (undated) DEVICE — ELCTR BOVIE PENCIL SMOKE EVACUATION

## (undated) DEVICE — ENDOCATCH 10MM SPECIMEN POUCH

## (undated) DEVICE — LIGASURE IMPACT

## (undated) DEVICE — PROTECTOR HEEL / ELBOW FLUFFY

## (undated) DEVICE — SALIVA EJECTOR (BLUE)

## (undated) DEVICE — TUBING MEDI-VAC W MAXIGRIP CONNECTORS 1/4"X6'

## (undated) DEVICE — TROCAR COVIDIEN VERSAPORT BLADELESS OPTICAL 5MM STANDARD

## (undated) DEVICE — DISSECTOR ENDOSCOPIC KITTNER SINGLE TIP

## (undated) DEVICE — SUT VICRYL 0 27" UR-6

## (undated) DEVICE — POSITIONER PINK PAD PIGAZZI SYSTEM

## (undated) DEVICE — LIGASURE MARYLAND 44CM

## (undated) DEVICE — XI SEAL UNIVERSIAL 5-12MM

## (undated) DEVICE — TUBING HYDRO-SURG PLUS IRRIGATOR W SMOKEVAC & PROBE

## (undated) DEVICE — DRSG BENZOIN 0.6CC

## (undated) DEVICE — CATH IV SAFE BC 22G X 1" (BLUE)

## (undated) DEVICE — UNDERPAD LINEN SAVER 17 X 24"

## (undated) DEVICE — PACK IV START WITH CHG

## (undated) DEVICE — TIP METZENBAUM SCISSOR MONOPOLAR ENDOCUT (ORANGE)

## (undated) DEVICE — XI OBTURATOR OPTICAL BLADELESS 8MM

## (undated) DEVICE — TROCAR COVIDIEN BLUNT TIP HASSAN 12MM

## (undated) DEVICE — DRSG BANDAID 0.75X3"

## (undated) DEVICE — BASIN EMESIS 10IN GRADUATED MAUVE

## (undated) DEVICE — BIOPSY FORCEP RADIAL JAW 4 STANDARD WITH NEEDLE

## (undated) DEVICE — WARMING BLANKET FULL ADULT

## (undated) DEVICE — DRSG 2X2

## (undated) DEVICE — GLV 7.5 PROTEXIS (CREAM) MICRO

## (undated) DEVICE — XI STAPLER SUREFORM 45

## (undated) DEVICE — SUT MONOCRYL 4-0 27" PS-2 UNDYED

## (undated) DEVICE — DRSG TELFA 3 X 8

## (undated) DEVICE — TUBING IV SET GRAVITY 3Y 100" MACRO

## (undated) DEVICE — CLAMP BX HOT RAD JAW 3

## (undated) DEVICE — BASIN SET SINGLE

## (undated) DEVICE — PACK GENERAL LAPAROSCOPY

## (undated) DEVICE — DRSG TEGADERM 2.5X3"

## (undated) DEVICE — XI DRAPE COLUMN

## (undated) DEVICE — SOL IRR POUR NS 0.9% 500ML

## (undated) DEVICE — XI VESSEL SEALER

## (undated) DEVICE — FOLEY TRAY 16FR 5CC LF UMETER CLOSED

## (undated) DEVICE — DRSG CURITY GAUZE SPONGE 4 X 4" 12-PLY NON-STERILE

## (undated) DEVICE — DENTURE CUP PINK

## (undated) DEVICE — TUBING STRYKEFLOW II SUCTION / IRRIGATOR

## (undated) DEVICE — SOL IRR POUR H2O 500ML

## (undated) DEVICE — TUBING OLYMPUS INSUFFLATION

## (undated) DEVICE — DRSG STERISTRIPS 0.5 X 4"

## (undated) DEVICE — BIOPSY FORCEP COLD DISP

## (undated) DEVICE — POSITIONER STRAP ARMBOARD VELCRO TS-30

## (undated) DEVICE — LIGASURE MARYLAND 37CM

## (undated) DEVICE — VENODYNE/SCD SLEEVE CALF MEDIUM

## (undated) DEVICE — XI ARM FORCEP FENESTRATED BIPOLAR 8MM

## (undated) DEVICE — SUT POLYSORB 0 60" TIES UNDYED

## (undated) DEVICE — ELCTR GROUNDING PAD ADULT COVIDIEN

## (undated) DEVICE — TUBING INSUFFLATION LAP FILTER 10FT

## (undated) DEVICE — XI DRAPE ARM

## (undated) DEVICE — STAPLER COVIDIEN ENDO GIA STANDARD HANDLE

## (undated) DEVICE — D HELP - CLEARVIEW CLEARIFY SYSTEM

## (undated) DEVICE — GOWN LG

## (undated) DEVICE — BITE BLOCK ADULT 20 X 27MM (GREEN)

## (undated) DEVICE — LUBRICATING JELLY HR ONE SHOT 3G

## (undated) DEVICE — CONTAINER FORMALIN 80ML YELLOW

## (undated) DEVICE — BLADE SURGICAL #15 CARBON